# Patient Record
Sex: MALE | Race: WHITE | NOT HISPANIC OR LATINO
[De-identification: names, ages, dates, MRNs, and addresses within clinical notes are randomized per-mention and may not be internally consistent; named-entity substitution may affect disease eponyms.]

---

## 2019-08-28 ENCOUNTER — APPOINTMENT (OUTPATIENT)
Dept: PULMONOLOGY | Facility: CLINIC | Age: 64
End: 2019-08-28
Payer: MEDICARE

## 2019-08-28 VITALS
TEMPERATURE: 98.1 F | BODY MASS INDEX: 36.86 KG/M2 | RESPIRATION RATE: 15 BRPM | DIASTOLIC BLOOD PRESSURE: 86 MMHG | SYSTOLIC BLOOD PRESSURE: 121 MMHG | HEART RATE: 103 BPM | OXYGEN SATURATION: 96 % | HEIGHT: 63 IN | WEIGHT: 208 LBS

## 2019-08-28 PROCEDURE — 99204 OFFICE O/P NEW MOD 45 MIN: CPT

## 2019-08-28 RX ORDER — LISINOPRIL 20 MG/1
20 TABLET ORAL
Refills: 0 | Status: ACTIVE | COMMUNITY

## 2019-08-28 RX ORDER — ATORVASTATIN CALCIUM 40 MG/1
40 TABLET, FILM COATED ORAL
Refills: 0 | Status: ACTIVE | COMMUNITY

## 2019-08-28 NOTE — ASSESSMENT
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  Patient was d/w sleep apnea in 2011 and was fitted for a pressure of 14 cm H20. Patient wanted a new machine and went for a repeat PSG (not provided during encounter) and because insurance company wanted a face-face would not provide the machine.  The new pressure is 13 cmH20 according to a note brought by the mother.  Patient has [employer] as his DME supplier.  Preferred to switch suppliers at this time, will place order.  Reached out to sleep clinic in [place] to receive the official results of the sleep study. Patient referred to the [facility] for mask fitting. The ramifications of VAZQUEZ and its potential therapeutic modalities were discussed with the patient. The patient was cautioned on the importance of avoiding drowsy driving. He will follow up with us after the PSG.\par  \par Sohan Elder, \par Sleep Fellow

## 2019-08-28 NOTE — PHYSICAL EXAM
[General Appearance - Well Developed] : well developed [Normal Appearance] : normal appearance [Well Groomed] : well groomed [General Appearance - Well Nourished] : well nourished [No Deformities] : no deformities [General Appearance - In No Acute Distress] : no acute distress [Normal Conjunctiva] : the conjunctiva exhibited no abnormalities [Eyelids - No Xanthelasma] : the eyelids demonstrated no xanthelasmas [Normal Oropharynx] : normal oropharynx [Neck Appearance] : the appearance of the neck was normal [Neck Cervical Mass (___cm)] : no neck mass was observed [Jugular Venous Distention Increased] : there was no jugular-venous distention [Thyroid Nodule] : there were no palpable thyroid nodules [Thyroid Diffuse Enlargement] : the thyroid was not enlarged [Heart Rate And Rhythm] : heart rate was normal and rhythm regular [Heart Sounds] : normal S1 and S2 [Murmurs] : no murmurs [Heart Sounds Gallop] : no gallops [Heart Sounds Pericardial Friction Rub] : no pericardial rub [Auscultation Breath Sounds / Voice Sounds] : lungs were clear to auscultation bilaterally [Abnormal Walk] : normal gait [Motor Tone] : muscle strength and tone were normal [Musculoskeletal - Swelling] : no joint swelling seen [Nail Clubbing] : no clubbing of the fingernails [Cyanosis, Localized] : no localized cyanosis [Petechial Hemorrhages (___cm)] : no petechial hemorrhages [Skin Color & Pigmentation] : normal skin color and pigmentation [Skin Turgor] : normal skin turgor [] : no rash [Deep Tendon Reflexes (DTR)] : deep tendon reflexes were 2+ and symmetric [Sensation] : the sensory exam was normal to light touch and pinprick [No Focal Deficits] : no focal deficits [Oriented To Time, Place, And Person] : oriented to person, place, and time [Impaired Insight] : insight and judgment were intact [Affect] : the affect was normal

## 2019-08-28 NOTE — REVIEW OF SYSTEMS
[EDS: ESS=____] : daytime somnolence: ESS=[unfilled] [Fatigue] : fatigue [Obesity] : obesity [Negative] : Psychiatric

## 2019-08-28 NOTE — HISTORY OF PRESENT ILLNESS
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  Patient has been compliant with therapy but wanted to obtain a new machine.  Had a sleep study performed in 02/2019 but patient did not obtain machine as he did not have a face to face with a physician.  He endorses a 20 lbs weight loss but no other significant changes to medical history.  [Snoring] : no snoring [Witnessed Apneas] : no witnessed sleep apnea [Frequent Nocturnal Awakening] : no nocturnal awakening

## 2019-09-06 ENCOUNTER — EMERGENCY (EMERGENCY)
Facility: HOSPITAL | Age: 64
LOS: 1 days | Discharge: HOME | End: 2019-09-06
Attending: EMERGENCY MEDICINE
Payer: MEDICARE

## 2019-09-06 VITALS
TEMPERATURE: 98 F | WEIGHT: 205.03 LBS | DIASTOLIC BLOOD PRESSURE: 98 MMHG | SYSTOLIC BLOOD PRESSURE: 137 MMHG | OXYGEN SATURATION: 95 % | HEART RATE: 113 BPM | RESPIRATION RATE: 18 BRPM | HEIGHT: 63 IN

## 2019-09-06 PROCEDURE — 93010 ELECTROCARDIOGRAM REPORT: CPT

## 2019-09-06 PROCEDURE — 99285 EMERGENCY DEPT VISIT HI MDM: CPT | Mod: GC

## 2019-09-07 VITALS
HEART RATE: 75 BPM | SYSTOLIC BLOOD PRESSURE: 140 MMHG | RESPIRATION RATE: 18 BRPM | DIASTOLIC BLOOD PRESSURE: 86 MMHG | OXYGEN SATURATION: 99 %

## 2019-09-07 LAB
ALBUMIN SERPL ELPH-MCNC: 4.2 G/DL — SIGNIFICANT CHANGE UP (ref 3.3–5)
ALP SERPL-CCNC: 104 U/L — SIGNIFICANT CHANGE UP (ref 40–120)
ALT FLD-CCNC: 28 U/L — SIGNIFICANT CHANGE UP (ref 10–45)
ANION GAP SERPL CALC-SCNC: 15 MMOL/L — SIGNIFICANT CHANGE UP (ref 5–17)
APTT BLD: 32.9 SEC — SIGNIFICANT CHANGE UP (ref 27.5–36.3)
AST SERPL-CCNC: 27 U/L — SIGNIFICANT CHANGE UP (ref 10–40)
BILIRUB SERPL-MCNC: 0.5 MG/DL — SIGNIFICANT CHANGE UP (ref 0.2–1.2)
BUN SERPL-MCNC: 28 MG/DL — HIGH (ref 7–23)
CALCIUM SERPL-MCNC: 10.1 MG/DL — SIGNIFICANT CHANGE UP (ref 8.4–10.5)
CHLORIDE SERPL-SCNC: 102 MMOL/L — SIGNIFICANT CHANGE UP (ref 96–108)
CO2 SERPL-SCNC: 21 MMOL/L — LOW (ref 22–31)
CREAT SERPL-MCNC: 1.7 MG/DL — HIGH (ref 0.5–1.3)
D DIMER BLD IA.RAPID-MCNC: 160 NG/ML DDU — SIGNIFICANT CHANGE UP
GLUCOSE SERPL-MCNC: 127 MG/DL — HIGH (ref 70–99)
HCT VFR BLD CALC: 45.3 % — SIGNIFICANT CHANGE UP (ref 39–50)
HGB BLD-MCNC: 14.8 G/DL — SIGNIFICANT CHANGE UP (ref 13–17)
INR BLD: 1.02 RATIO — SIGNIFICANT CHANGE UP (ref 0.88–1.16)
MAGNESIUM SERPL-MCNC: 2 MG/DL — SIGNIFICANT CHANGE UP (ref 1.6–2.6)
MCHC RBC-ENTMCNC: 31 PG — SIGNIFICANT CHANGE UP (ref 27–34)
MCHC RBC-ENTMCNC: 32.8 GM/DL — SIGNIFICANT CHANGE UP (ref 32–36)
MCV RBC AUTO: 94.7 FL — SIGNIFICANT CHANGE UP (ref 80–100)
PLATELET # BLD AUTO: 253 K/UL — SIGNIFICANT CHANGE UP (ref 150–400)
POTASSIUM SERPL-MCNC: 3.9 MMOL/L — SIGNIFICANT CHANGE UP (ref 3.5–5.3)
POTASSIUM SERPL-SCNC: 3.9 MMOL/L — SIGNIFICANT CHANGE UP (ref 3.5–5.3)
PROT SERPL-MCNC: 7.1 G/DL — SIGNIFICANT CHANGE UP (ref 6–8.3)
PROTHROM AB SERPL-ACNC: 11.7 SEC — SIGNIFICANT CHANGE UP (ref 10–12.9)
RBC # BLD: 4.78 M/UL — SIGNIFICANT CHANGE UP (ref 4.2–5.8)
RBC # FLD: 11.7 % — SIGNIFICANT CHANGE UP (ref 10.3–14.5)
SODIUM SERPL-SCNC: 138 MMOL/L — SIGNIFICANT CHANGE UP (ref 135–145)
TROPONIN T, HIGH SENSITIVITY RESULT: 12 NG/L — SIGNIFICANT CHANGE UP (ref 0–51)
TROPONIN T, HIGH SENSITIVITY RESULT: 13 NG/L — SIGNIFICANT CHANGE UP (ref 0–51)
WBC # BLD: 12.6 K/UL — HIGH (ref 3.8–10.5)
WBC # FLD AUTO: 12.6 K/UL — HIGH (ref 3.8–10.5)

## 2019-09-07 PROCEDURE — 71046 X-RAY EXAM CHEST 2 VIEWS: CPT | Mod: 26

## 2019-09-07 PROCEDURE — 80053 COMPREHEN METABOLIC PANEL: CPT

## 2019-09-07 PROCEDURE — 85730 THROMBOPLASTIN TIME PARTIAL: CPT

## 2019-09-07 PROCEDURE — 93005 ELECTROCARDIOGRAM TRACING: CPT

## 2019-09-07 PROCEDURE — 83735 ASSAY OF MAGNESIUM: CPT

## 2019-09-07 PROCEDURE — 85027 COMPLETE CBC AUTOMATED: CPT

## 2019-09-07 PROCEDURE — 84484 ASSAY OF TROPONIN QUANT: CPT

## 2019-09-07 PROCEDURE — 71046 X-RAY EXAM CHEST 2 VIEWS: CPT

## 2019-09-07 PROCEDURE — 85610 PROTHROMBIN TIME: CPT

## 2019-09-07 PROCEDURE — 99284 EMERGENCY DEPT VISIT MOD MDM: CPT | Mod: 25

## 2019-09-07 PROCEDURE — 85379 FIBRIN DEGRADATION QUANT: CPT

## 2019-09-07 RX ORDER — SODIUM CHLORIDE 9 MG/ML
1000 INJECTION INTRAMUSCULAR; INTRAVENOUS; SUBCUTANEOUS ONCE
Refills: 0 | Status: COMPLETED | OUTPATIENT
Start: 2019-09-07 | End: 2019-09-07

## 2019-09-07 RX ADMIN — SODIUM CHLORIDE 1000 MILLILITER(S): 9 INJECTION INTRAMUSCULAR; INTRAVENOUS; SUBCUTANEOUS at 02:00

## 2019-09-07 NOTE — ED ADULT NURSE NOTE - OBJECTIVE STATEMENT
64 yr old male arrived to the ED form home c/o palpitations. HX HDL, seizures, depression  per pt he was been feeling a "squeezing" over the L side of his chest x3-4 days. pt also endorses feeling episodes of sweating as well as feeling "spacey" and dizzy. upon assessment pt is a&ox3, pt is speaking slowly but clearly, flat affect noted. pt having difficulty following commands. lungs clear tonya. abd is soft, non tender. chest pain is over L chest wall, non radiating, non produceable with palpation. pt denies fever, chills, nausea, vomiting, chest pain or sob

## 2019-09-07 NOTE — ED PROVIDER NOTE - CLINICAL SUMMARY MEDICAL DECISION MAKING FREE TEXT BOX
63 y/o M with hx of HTN, HLD, epilepsy,  p/w one month of palpitations and episodes of "zoning out". Given history will r/o ACS with ECG, troponin, CXR. Heart score of 3. Will r/o PE with D dimer. Doubt dissection given exam and hisotry of sx's.   Considered possible absence seizures although less likely given age of onset. 65 y/o M with hx of HTN, HLD, epilepsy,  p/w one month of palpitations and episodes of "zoning out". Given history will r/o ACS with ECG, troponin, CXR. Heart score of 3. Will r/o PE with D dimer. Doubt dissection given exam and hisotry of sx's.   Considered possible absence seizures although less likely given age of onset and prior sz manifested as tonic clonic.  Patient on multiple anti-epileptics, has close f/u c neuro.  --BMM

## 2019-09-07 NOTE — ED PROVIDER NOTE - OBJECTIVE STATEMENT
Mr Simón is a 63 y/o M with a hx of HTN, HLD, Epilepsy who p/w one month of gradually worsening chest discomfort. Pt has intermittent episodes of feeling like the left side of his chest is "squeezing". Tonight he felt like he had 30< episodes on the drive here. Denies any pain with exertion or radiation. No SOB, diaphoresis, LE swelling, nausea, vomiting, recent illnesses.   Pt also endorses episodes of starring off for about a min. He feels like these starring episodes are sometimes associated with the chest discomfort. Mr Simón is a 65 y/o M with a hx of HTN, HLD, Epilepsy who p/w one month of gradually worsening chest discomfort. Pt has intermittent episodes of feeling like the left side of his chest is "squeezing". Tonight he felt like he had 30< episodes on the drive here. Denies any pain with exertion or radiation. No SOB, diaphoresis, LE swelling, nausea, vomiting, recent illnesses.  States this has been going on for several weeks, does not believe there is any change in nature of symptoms recently.  Accompanied by sister, who was concerned about sypmtoms; states patient has mild DD.    Pt also endorsed 1 recent episode of starring off for about a min. He feels like this starring episode is sometimes associated with the chest discomfort.  As per sister, patient does not have a history of absence seizures.

## 2019-09-07 NOTE — ED PROVIDER NOTE - NSFOLLOWUPINSTRUCTIONS_ED_ALL_ED_FT
1) You were seen in the ER for palpitations. The patient has been informed of all concerning signs and symptoms to return to Emergency Department, the necessity to follow up with PMD/Clinic/follow up provided within 2-3 days was explained, and the patient reports understanding of above with capacity and insight. You can look at the discharge papers for some examples of specific signs and symptoms to look out for.  2) Please follow up with CARDIOLOGY to set up stress test, call them to make appointment.   2) Please follow up with your NEUROLOGIST for possible video EEGs.

## 2019-09-07 NOTE — ED PROVIDER NOTE - NSFOLLOWUPCLINICS_GEN_ALL_ED_FT
[facility]  Cardiology  [address] [place] [id number]  Phone: [phone]  Fax:   Follow Up Time:

## 2019-09-07 NOTE — ED PROVIDER NOTE - PATIENT PORTAL LINK FT
You can access the FollowMyHealth Patient Portal offered by [facility] by registering at the following website: [email or web address]. By joining [employer]’s FollowMyHealth portal, you will also be able to view your health information using other applications (apps) compatible with our system.

## 2019-09-07 NOTE — ED PROVIDER NOTE - PROGRESS NOTE DETAILS
Dr Khan: pt informed about Cr, aware of his CKd and is being followed by his pcp. Trops negative, Discussed admission for stress Sunday night into monday but pt does not want admission, would rayjer f/u with cardiologist outpt to set up stress. Pt also endorses staring episodes that he is following neurology for. Dr Khan: pt informed about Cr, aware of his CKd and is being followed by his pcp. Trops negative, Recommended admission for stress Sunday night into monday but pt does not want admission, would rayjer f/u with cardiologist outpt to set up stress. Pt also endorses staring episodes that he is following neurology for.  Understands symptoms may be related to atypical acute coronary syndrome and was provided strict return precautions.

## 2019-09-16 ENCOUNTER — APPOINTMENT (OUTPATIENT)
Dept: ORTHOPEDIC SURGERY | Facility: CLINIC | Age: 64
End: 2019-09-16
Payer: MEDICARE

## 2019-09-16 VITALS
HEIGHT: 63 IN | WEIGHT: 211 LBS | DIASTOLIC BLOOD PRESSURE: 78 MMHG | HEART RATE: 62 BPM | BODY MASS INDEX: 37.39 KG/M2 | SYSTOLIC BLOOD PRESSURE: 125 MMHG

## 2019-09-16 DIAGNOSIS — M40.40 POSTURAL LORDOSIS, SITE UNSPECIFIED: ICD-10-CM

## 2019-09-16 DIAGNOSIS — M40.209 UNSPECIFIED KYPHOSIS, SITE UNSPECIFIED: ICD-10-CM

## 2019-09-16 PROCEDURE — 99203 OFFICE O/P NEW LOW 30 MIN: CPT

## 2019-09-16 PROCEDURE — 72082 X-RAY EXAM ENTIRE SPI 2/3 VW: CPT

## 2019-09-16 NOTE — PHYSICAL EXAM
[Stooped] : stooped [de-identified] : Examination of the thoracic and lumbar spine reveals no midline tenderness palpation, step-offs, or skin lesions. He has significant upper thoracic kyphosis. Well-healed thoracolumbar incision. Decreased range of motion with respect to flexion, extension, lateral bending, and rotation. No tenderness to palpation of the sciatic notch. No tenderness palpation of the bilateral greater trochanters. No pain with passive internal/external rotation of the hips. No instability of bilateral lower extremities.  Negative SIDRA. Negative straight leg raise bilaterally. No bowstring. Negative femoral stretch. 5 out of 5 iliopsoas, hip abductors, hips adductors, quadriceps, hamstrings, gastrocsoleus, tibialis anterior, extensor hallucis longus, peroneals. Grossly intact sensation to light touch bilateral lower extremities. 1+ patellar and Achilles reflexes. Downgoing Babinski. No clonus. Intact proprioception. Palpable pulses. No skin lesion and no edema on the right and left lower extremities. [de-identified] : AP lateral scoliosis x-rays demonstrate instrumented thoracolumbar fusion with hook construct. He is dislodgment of his hooks in his lumbar spine. Kyphosis of the thoracic spine with hyperlordosis of his lumbar spine

## 2019-09-16 NOTE — HISTORY OF PRESENT ILLNESS
[de-identified] : Mr. OSCAR MILAN  is a 64 year old male who presents to the office with a chronic history of low back pain.  He had a spinal fusion in 1995.  He has had pain since then.  He had multiple seizures and fell at that time.   Denies any LE radicular symptoms.  Normal bowel and bladder control.   Denies any recent fevers, chills, sweats, weight loss, or infection.  He takes tylenol for symptom control.  He is not very active. \par

## 2019-09-16 NOTE — DISCUSSION/SUMMARY
[de-identified] : We discussed nonsurgical and surgical options. He will try a course of physical therapy. Follow up afterwards.

## 2019-10-01 ENCOUNTER — OUTPATIENT (OUTPATIENT)
Dept: OUTPATIENT SERVICES | Facility: HOSPITAL | Age: 64
LOS: 1 days | End: 2019-10-01
Payer: MEDICARE

## 2019-10-01 PROCEDURE — G9001: CPT

## 2019-10-09 DIAGNOSIS — Z71.89 OTHER SPECIFIED COUNSELING: ICD-10-CM

## 2019-10-09 PROBLEM — E78.49 OTHER HYPERLIPIDEMIA: Chronic | Status: ACTIVE | Noted: 2019-09-07

## 2019-10-09 PROBLEM — I10 ESSENTIAL (PRIMARY) HYPERTENSION: Chronic | Status: ACTIVE | Noted: 2019-09-07

## 2020-01-23 ENCOUNTER — APPOINTMENT (OUTPATIENT)
Dept: PULMONOLOGY | Facility: CLINIC | Age: 65
End: 2020-01-23
Payer: MEDICARE

## 2020-01-23 VITALS
OXYGEN SATURATION: 97 % | HEART RATE: 67 BPM | DIASTOLIC BLOOD PRESSURE: 72 MMHG | WEIGHT: 200 LBS | SYSTOLIC BLOOD PRESSURE: 107 MMHG | TEMPERATURE: 98 F | RESPIRATION RATE: 16 BRPM | BODY MASS INDEX: 35.44 KG/M2 | HEIGHT: 63 IN

## 2020-01-23 PROCEDURE — 99214 OFFICE O/P EST MOD 30 MIN: CPT

## 2020-01-23 NOTE — PHYSICAL EXAM
[General Appearance - Well Developed] : well developed [Normal Appearance] : normal appearance [Well Groomed] : well groomed [General Appearance - Well Nourished] : well nourished [No Deformities] : no deformities [General Appearance - In No Acute Distress] : no acute distress [Normal Conjunctiva] : the conjunctiva exhibited no abnormalities [Eyelids - No Xanthelasma] : the eyelids demonstrated no xanthelasmas [Normal Oropharynx] : normal oropharynx [Heart Rate And Rhythm] : heart rate was normal and rhythm regular [Heart Sounds] : normal S1 and S2 [Heart Sounds Gallop] : no gallops [Murmurs] : no murmurs [Heart Sounds Pericardial Friction Rub] : no pericardial rub [Auscultation Breath Sounds / Voice Sounds] : lungs were clear to auscultation bilaterally [Abnormal Walk] : normal gait [Musculoskeletal - Swelling] : no joint swelling seen [Motor Tone] : muscle strength and tone were normal [Nail Clubbing] : no clubbing of the fingernails [Petechial Hemorrhages (___cm)] : no petechial hemorrhages [Cyanosis, Localized] : no localized cyanosis [Skin Color & Pigmentation] : normal skin color and pigmentation [Skin Turgor] : normal skin turgor [] : no rash [Deep Tendon Reflexes (DTR)] : deep tendon reflexes were 2+ and symmetric [Sensation] : the sensory exam was normal to light touch and pinprick [No Focal Deficits] : no focal deficits [Oriented To Time, Place, And Person] : oriented to person, place, and time [Impaired Insight] : insight and judgment were intact [Affect] : the affect was normal [Neck Appearance] : the appearance of the neck was normal [Neck Cervical Mass (___cm)] : no neck mass was observed [Thyroid Diffuse Enlargement] : the thyroid was not enlarged [Jugular Venous Distention Increased] : there was no jugular-venous distention [Thyroid Nodule] : there were no palpable thyroid nodules

## 2020-01-23 NOTE — HISTORY OF PRESENT ILLNESS
[To Bed: ___] : ~he/she~ goes to bed at [unfilled] [Arises: ___] : arises at [unfilled] [AHI: ___ per hour] : Apnea-hypopnea index:  [unfilled] per hour [T90%: ___] : T90%: [unfilled]% [Robby desatuation%: ___] : Robby desaturation:  [unfilled]% [Date: ___] : the most recent therapeutic polysomnogram was completed [unfilled] [CPAP: ___ cmH2O] : CPAP: [unfilled] cmH2O [% Days used: ____] : Days used: [unfilled] % [% Days used > 4 hrs: ____] : Days used > 4 hrs: [unfilled] % [Mean nightly usage: ___ hrs] : Mean nightly usage: [unfilled] hrs [Therapy based AHI: ___ /hr] : Therapy based AHI: [unfilled] / hr [FreeTextEntry1] : 64 year old OSCAR MILAN  presents for follow-up to Aug/2019 visit for continued management of severe obstructive sleep apnea on CPAP therapy.\par \par COMORBID:  epilepsy\par \par CHIEF COMPLAINT:  Mask occasionally dislodges\par \par THERAPY:  The patient has been treated with CPAP therapy since he was diagnosed with severe VAZQUEZ in 2011.  He tolerates pressure of 13 cmH2O well. \par \par COMPLIANCE:  He reports nightly CPAP use from 10:30PM-6AM.\par BENEFIT:  improvement in  \par \par SLEEP:  \par \par INTERIM CHANGES:  in health, weight, medications.\par \par  [Nocturnal Oxygen] : The patient does not use nocturnal oxygen

## 2020-01-23 NOTE — ASSESSMENT
[FreeTextEntry1] : 64 year old OSCAR MILAN with epilepsy; continues to derive benefit from PAP therapy for severe obstructive sleep apnea.\par \par A review of therapeutic and compliance data reveals usage on 100% of nights averaging 2 hours 31 minutes; effective pressure at 13 cmH2O with therapy based AHI of 4.6, -- however, he has the machine on for the entirety of the night with most of the night with mask not at pressure-- he stated difficulty keeping the mask in place which is likely responsible for this. he requires mask re-fitting and education as to how to keep the mask in place throughout the night.  he denies difficulty tolerating cpap. however he reports that his sleep is adequate and that he denies eds. \par \par The patient is benefitting from CPAP therapy with continued resolution of VAZQUEZ-related symptoms and should continue PAP therapy at the current pressure setting.  Patient was referred to our center for a mask fitting.\par \par He will follow-up in 3-months with Bella to see if the mask on time is improved to the entirety of the night after mask interface refitting and education to be performed at the [facility].

## 2020-01-23 NOTE — REVIEW OF SYSTEMS
[Obesity] : obesity [Depression] : depression [Difficulty Initiating Sleep] : no difficulty falling asleep [Lower Extremity Discomfort] : no lower extremity discomfort [Unusual Sleep Behavior] : no unusual sleep behavior [Negative] : Musculoskeletal [de-identified] : as per PMH

## 2020-04-08 ENCOUNTER — APPOINTMENT (OUTPATIENT)
Dept: PULMONOLOGY | Facility: CLINIC | Age: 65
End: 2020-04-08

## 2020-07-27 ENCOUNTER — FORM ENCOUNTER (OUTPATIENT)
Age: 65
End: 2020-07-27

## 2020-08-09 ENCOUNTER — FORM ENCOUNTER (OUTPATIENT)
Age: 65
End: 2020-08-09

## 2020-08-12 ENCOUNTER — APPOINTMENT (OUTPATIENT)
Dept: PULMONOLOGY | Facility: CLINIC | Age: 65
End: 2020-08-12
Payer: MEDICARE

## 2020-08-12 VITALS
SYSTOLIC BLOOD PRESSURE: 136 MMHG | WEIGHT: 200 LBS | HEIGHT: 63 IN | DIASTOLIC BLOOD PRESSURE: 84 MMHG | HEART RATE: 57 BPM | RESPIRATION RATE: 15 BRPM | BODY MASS INDEX: 35.44 KG/M2 | TEMPERATURE: 98.5 F

## 2020-08-12 DIAGNOSIS — F32.9 MAJOR DEPRESSIVE DISORDER, SINGLE EPISODE, UNSPECIFIED: ICD-10-CM

## 2020-08-12 DIAGNOSIS — G40.909 EPILEPSY, UNSPECIFIED, NOT INTRACTABLE, W/OUT STATUS EPILEPTICUS: ICD-10-CM

## 2020-08-12 PROCEDURE — 99214 OFFICE O/P EST MOD 30 MIN: CPT

## 2020-08-12 RX ORDER — LEVETIRACETAM 750 MG/1
750 TABLET, FILM COATED ORAL TWICE DAILY
Refills: 0 | Status: ACTIVE | COMMUNITY

## 2020-08-12 RX ORDER — LAMOTRIGINE 100 MG/1
100 TABLET ORAL TWICE DAILY
Refills: 0 | Status: ACTIVE | COMMUNITY

## 2020-08-12 RX ORDER — GABAPENTIN 300 MG/1
300 CAPSULE ORAL EVERY MORNING
Refills: 0 | Status: ACTIVE | COMMUNITY

## 2020-08-12 NOTE — PHYSICAL EXAM
[Normal Appearance] : normal appearance [Well Groomed] : well groomed [Normal Conjunctiva] : the conjunctiva exhibited no abnormalities [General Appearance - In No Acute Distress] : no acute distress [Neck Appearance] : the appearance of the neck was normal [Involuntary Movements] : no involuntary movements were seen [No Focal Deficits] : no focal deficits [Mood] : the mood was normal [Affect] : the affect was normal [FreeTextEntry2] : no edema

## 2020-08-12 NOTE — ASSESSMENT
[FreeTextEntry1] : 65 year old OSCAR MILAN with severe obstructive sleep apnea treated with CPAP since 2011, presents for follow-up.\par \par Severe VAZQUEZ on CPAP 13 cmH2O\par - Reviewed therapy and compliance data download with patient\par - Good CPAP compliance (on 100% of nights averaging 10-hours 16-minutes.\par - Residual AHI (11.5) on pressure at 13 cmH2O\par - Excessive mask leak (46.9%) (4 hrs 49 mins on average)  \par - Benefit noted with improvement in sleep quality, daytime sleepiness and alertness.\par - Renewed supplies from [employer] including fit pack for nasal mask to continue therapy at 13 cmH2O.\par - Repeat data download in 2-3 weeks to check if new mask resolves excessive mask leak and residual AHI.\par \par He will follow up in one year or sooner if needed.\par

## 2020-08-12 NOTE — REVIEW OF SYSTEMS
[Recent Wt Loss (___ Lbs)] : recent [unfilled] ~Ulb weight loss [Obesity] : obesity [Nocturia] : nocturia [Depression] : depression [Difficulty Initiating Sleep] : difficulty falling asleep [Fatigue] : no fatigue [Nasal Congestion] : no nasal congestion [Postnasal Drip] : no postnasal drip [Shortness Of Breath] : no shortness of breath [Chest Pain] : no chest pain [Palpitations] : no palpitations [Edema] : ~T edema was not present [Thyroid Disease] : no thyroid disease [Diabetes] : no diabetes  [Anemia] : no anemia [History of Iron Deficiency] : no history of iron deficiency [A.M. Headache] : no headache present upon awakening [Heartburn] : no heartburn [Arthralgias] : no arthralgias [Anxious] : not anxious [Difficulty Maintaining Sleep] : no difficulty maintaining sleep [Lower Extremity Discomfort] : no lower extremity discomfort [Unusual Sleep Behavior] : no unusual sleep behavior [de-identified] : Paranoid schizophrenia.  Virtual visits with psychiatrist Dr. Reza and therapist once or twice monthly. [de-identified] : Epilepsy.  Last seizure March/April 2019 (had run out of lamictal)

## 2020-08-12 NOTE — HISTORY OF PRESENT ILLNESS
[AHI: ___ per hour] : Apnea-hypopnea index:  [unfilled] per hour [T90%: ___] : T90%: [unfilled]% [Robby desatuation%: ___] : Robby desaturation:  [unfilled]% [% Days used: ____] : Days used: [unfilled] % [Mean nightly usage: ___ hrs] : Mean nightly usage: [unfilled] hrs [Unintentional Sleep While Inactive] : unintentional sleep while inactive [DIS] : DIS [Awakening With Dry Mouth] : awakening with dry mouth [Arises: ___] : arises at [unfilled] [To Bed: ___] : ~he/she~ goes to bed at [unfilled] [Sleep Onset Latency: ___ minutes] : sleep onset latency of [unfilled] minutes reported [Nocturnal Awakenings: ___] : ~he/she~ typically has [unfilled] nocturnal awakenings [Daytime Sleep: ___] : daytime sleep: [unfilled] [TST: ___] : Total sleep time is [unfilled] [ESS: ___] : ESS score [unfilled] [WASO: ___] : Wake time after sleep onset is [unfilled] [CPAP: ___ cmH2O] : CPAP: [unfilled] cmH2O [Date: ___] : the most recent therapeutic polysomnogram was completed [unfilled] [% Days used > 4 hrs: ____] : Days used > 4 hrs: [unfilled] % [Therapy based AHI: ___ /hr] : Therapy based AHI: [unfilled] / hr [FreeTextEntry1] : 65 year old OSCAR MILAN with severe obstructive sleep apnea treated with CPAP therapy since 2011, presents for follow-up\par \par PMH:  Epilepsy, Paranoid Schizophrenia, Depression, HTN, HLD. obesity.\par \par VAZQUEZ\par The patient has been treated with CPAP therapy since 2011 diagnosis of severe VAZQUEZ (AHI 97.5) with severe decreases in oxygen saturation at [facility].  He presented to our center in Aug/2019 with request for a replacement CPAP, daytime somnolence and fatigue.  Nightly CPAP use for 8 hours at pressure of 13 cmH2O with nasal pillows mask is well tolerated, with noted improvement in sleep quality, daytime sleepiness and alertness.\par \par CHIEF COMPLAINT:  Patient complains that nasal pillows mask slides and leaks when he turns on his side.  His prior nasal mask did not, but it broke.  \par It takes 1.5-2 hours to fall asleep.\par \par SLEEP:  Bedtime 10:30PM.  It takes 1.5 to 2 hours to fall asleep.  He may awaken up to 3 times briefly for nocturia.  He estimates sleeping "at least 7-hours.".  He takes an occasional 1-hour nap any time of day, and falls asleep when bored.\par \par INTERIM CHANGES:  Medication list and Allergy list was updated.  2 other medications for auditory hallucinations were added, but he does recall the names.  He lost 3-lbs weight.  [Snoring] : no snoring [Frequent Nocturnal Awakening] : no nocturnal awakening [Witnessed Apneas] : no witnessed sleep apnea [Daytime Somnolence] : no daytime somnolence [Unintentional Sleep while Active] : no unintentional sleep while active [Awakes Unrefreshed] : does not awake unrefreshed [Awakes with Headache] : no headache upon awakening [Recent  Weight Gain] : no recent weight gain [Lower Extremity Discomfort] : no lower extremity discomfort in evening or at bedtime [Unusual Sleep Behavior] : no unusual sleep behavior [DMS] : no DMS [Nocturnal Oxygen] : The patient does not use nocturnal oxygen

## 2021-07-28 ENCOUNTER — APPOINTMENT (OUTPATIENT)
Dept: PULMONOLOGY | Facility: CLINIC | Age: 66
End: 2021-07-28
Payer: MEDICARE

## 2021-07-28 VITALS
DIASTOLIC BLOOD PRESSURE: 78 MMHG | RESPIRATION RATE: 15 BRPM | HEIGHT: 63 IN | TEMPERATURE: 98.1 F | HEART RATE: 67 BPM | SYSTOLIC BLOOD PRESSURE: 117 MMHG | WEIGHT: 200 LBS | BODY MASS INDEX: 35.44 KG/M2

## 2021-07-28 DIAGNOSIS — Z99.89 OBSTRUCTIVE SLEEP APNEA (ADULT) (PEDIATRIC): ICD-10-CM

## 2021-07-28 DIAGNOSIS — G47.33 OBSTRUCTIVE SLEEP APNEA (ADULT) (PEDIATRIC): ICD-10-CM

## 2021-07-28 PROCEDURE — 99215 OFFICE O/P EST HI 40 MIN: CPT

## 2021-07-28 RX ORDER — LURASIDONE HYDROCHLORIDE 20 MG/1
20 TABLET, FILM COATED ORAL EVERY MORNING
Refills: 0 | Status: DISCONTINUED | COMMUNITY
End: 2021-07-28

## 2021-07-28 RX ORDER — LURASIDONE HYDROCHLORIDE 40 MG/1
40 TABLET, FILM COATED ORAL
Refills: 0 | Status: ACTIVE | COMMUNITY

## 2021-07-28 NOTE — REVIEW OF SYSTEMS
[Fatigue] : fatigue [Obesity] : obesity [Heartburn] : heartburn [Nocturia] : nocturia [Arthralgias] : arthralgias [Depression] : depression [Nasal Congestion] : no nasal congestion [Postnasal Drip] : no postnasal drip [Shortness Of Breath] : no shortness of breath [Chest Pain] : no chest pain [Palpitations] : no palpitations [Edema] : ~T edema was not present [Thyroid Disease] : no thyroid disease [Diabetes] : no diabetes  [Anemia] : no anemia [History of Iron Deficiency] : no history of iron deficiency [Anxious] : not anxious [FreeTextEntry3] : sedentary [de-identified] : seizures under control.  Last seizure 4/2019. [FreeTextEntry6] : without sleep disturbance

## 2021-07-28 NOTE — ASSESSMENT
[FreeTextEntry1] : 66 year old OSCAR MILAN with Epilepsy, paranoid schizophrenia, depression, HTN, HLD, obesity class II; continues to derive benefit from CPAP therapy for obstructive sleep apnea since 2011.\par \par SEVERE VAZQUEZ ( AHI 97.5) on [employer] CPAP at 13 cmH2O using a nasal pillows mask\par \par CPAP use every night relaxes him, and improves his sleep.  AHI improved from 97.5 to 3.5.  [place] Sleepiness Scale score of 10 today.   Therapy and compliance data demonstrates good compliance nightly, insufficient hourly use (3 hours 12 minutes on average) with excessive mask leak.  Therapeutic AHI at 3.5 on pressure of 13 cmH2O.\par \par Patient c/o Nasal pillows mask falls out, cheek skin irritation, and not receiving a renewal of supplies.\par \par [employer]' recall information was reviewed and provided in written form, for potential health risk related to ingestion or inhalation of the sound-abatement foam used.  Patient is not experiencing new headaches, respiratory problems, nasal / throat irritation, or visible particles in the tubing.  Having weighed benefit vs risk, patient was advised to continue therapy for severe VAZQUEZ until device is repaired or replaced. \par \par PLAN\par Renewal of supplies from [employer] to continue therapy.\par They will phone [employer] on Monday if patient has not been contacted by then.\par If new mask still falls out, they will consider an in-office mask fitting with [facility].\par They will consider skin-liners for cheek irritation.

## 2021-07-28 NOTE — PHYSICAL EXAM
[Normal Appearance] : normal appearance [Well Groomed] : well groomed [General Appearance - In No Acute Distress] : no acute distress [Normal Conjunctiva] : the conjunctiva exhibited no abnormalities [Neck Appearance] : the appearance of the neck was normal [Heart Rate And Rhythm] : heart rate was normal and rhythm regular [Murmurs] : no murmurs [] : no respiratory distress [Auscultation Breath Sounds / Voice Sounds] : lungs were clear to auscultation bilaterally [Involuntary Movements] : no involuntary movements were seen [No Focal Deficits] : no focal deficits [Affect] : the affect was normal [Mood] : the mood was normal [FreeTextEntry2] : no edema

## 2021-07-28 NOTE — HISTORY OF PRESENT ILLNESS
[Obstructive Sleep Apnea] : obstructive sleep apnea [Arises: ___] : arises at [unfilled] [Sleep Onset Latency: ___ minutes] : sleep onset latency of [unfilled] minutes reported [WASO: ___] : Wake time after sleep onset is [unfilled] [TST: ___] : Total sleep time is [unfilled] [Daytime Sleep: ___] : daytime sleep: [unfilled] [AHI: ___ per hour] : Apnea-hypopnea index:  [unfilled] per hour [T90%: ___] : T90%: [unfilled]% [Robby desatuation%: ___] : Robby desaturation:  [unfilled]% [Date: ___] : the most recent therapeutic polysomnogram was completed [unfilled] [CPAP: ___ cmH2O] : CPAP: [unfilled] cmH2O [% Days used: ____] : Days used: [unfilled] % [% Days used > 4 hrs: ____] : Days used > 4 hrs: [unfilled] % [Mean nightly usage: ___ hrs] : Mean nightly usage: [unfilled] hrs [Therapy based AHI: ___ /hr] : Therapy based AHI: [unfilled] / hr [Frequent Nocturnal Awakening] : frequent nocturnal awakening [Awakes Unrefreshed] : awakening unrefreshed [Recent  Weight Gain] : recent weight gain [Daytime Somnolence] : daytime somnolence [To Bed: ___] : ~he/she~ goes to bed at [unfilled] [Nocturnal Awakenings: ___] : ~he/she~ typically has [unfilled] nocturnal awakenings [FreeTextEntry1] : 66 year old OSCAR MILAN on CPAP therapy for severe obstructive sleep apnea since , presents for annual follow-up visit.\par \par COMORBID:  Epilepsy, paranoid schizophrenia, depression, HTN, HLD, obesty\par \par SEVERE VAZQUEZ ( AHI 97.5) on CPAP 13 cmH2O\par \par  The patient was diagnosed with severe VAZQUEZ (AHI 97.5), severe decreases in oxygen saturation at [facility], and has been treated with CPAP therapy since.  \par  He presented to our center with request for a replacement CPAP.\par \par Today patient reports CPAP use every night relaxes him, and improves his sleep.  \par c/o Nasal pillows mask falls out, cheek skin irritation, and not receiving a renewal of supplies.\par \par EPWORTH SLEEPINESS SCALE\par \par How likely are you to doze off or fall asleep in the situations described below, in contrast to feeling just tired?  This refers to your usual way of life in recent times.  Even if you haven't done some of these things recently, try to work out how they would have affected you.  Use the following scale to choose one most appropriate number for each situation.......Chance of dozin= never. 1= slight. 2= moderate. 3= high.\par \par CHANCE OF DOZING............SITUATION\par 1.............................................Sitting and reading\par 2.............................................Watching TV\par 1.............................................Sitting inactive in a public place (eg a theatre or a meeting)\par 2.............................................As a passenger in a car for an hour without a break\par 2.............................................Lying down to rest in the afternoon when circumstances permit\par 0.............................................Sitting and talking to someone\par 1.............................................Sitting quietly after lunch without alcohol\par 1.............................................In a car, while stopped for a few minutes in traffic\par \par 10............................................TOTAL ESS SCORE [Snoring] : no snoring [Witnessed Apneas] : no witnessed sleep apnea [Unintentional Sleep while Active] : no unintentional sleep while active [Unintentional Sleep While Inactive] : no unintentional sleep while inactive [Awakes with Headache] : no headache upon awakening [Awakening With Dry Mouth] : no dry mouth upon awakening [DIS] : no DIS [DMS] : no DMS [Lower Extremity Discomfort] : no lower extremity discomfort in evening or at bedtime [Unusual Sleep Behavior] : no unusual sleep behavior [Nocturnal Oxygen] : The patient does not use nocturnal oxygen [ESS] : 10

## 2021-12-08 ENCOUNTER — APPOINTMENT (OUTPATIENT)
Dept: PULMONOLOGY | Facility: CLINIC | Age: 66
End: 2021-12-08
Payer: MEDICARE

## 2021-12-08 VITALS
WEIGHT: 198 LBS | SYSTOLIC BLOOD PRESSURE: 116 MMHG | OXYGEN SATURATION: 94 % | TEMPERATURE: 97 F | BODY MASS INDEX: 35.08 KG/M2 | HEIGHT: 63 IN | RESPIRATION RATE: 15 BRPM | DIASTOLIC BLOOD PRESSURE: 77 MMHG | HEART RATE: 76 BPM

## 2021-12-08 PROCEDURE — 99214 OFFICE O/P EST MOD 30 MIN: CPT

## 2021-12-08 RX ORDER — COLD-HOT PACK
EACH MISCELLANEOUS
Refills: 0 | Status: ACTIVE | COMMUNITY

## 2021-12-08 RX ORDER — CHOLECALCIFEROL (VITAMIN D3) 25 MCG
TABLET ORAL
Refills: 0 | Status: ACTIVE | COMMUNITY

## 2021-12-08 NOTE — ASSESSMENT
[FreeTextEntry1] : 66 year old OSCAR MILAN continues to derive benefit from PAP therapy for obstructive sleep apnea since 2011.  History of HTN, HLD, epilepsy, paranoid schizophrenia, depression, and obesity class II.\par \par SEVERE VAZQUEZ (AHI 97.5) on CPAP 13 cmH2O using a nasal pillows mask.\par \par ·	Sleep and prior apnea-related symptoms have improved.  Kelso Sleepiness Scale score of 3 today.  \par •	Data download:  100% compliant. Average 8 hrs 31 min.  AHI 11.1. Insignificant mask leak.     \par \par Discussed [employer] PAP recall again in detail.  Insurance will not replace <5 year old 2019 PAP.  He has not received a replacement from [employer].  Mother is concerned about cancer risk, and is discouraging patient from continuing to use the recall device.  She requested a referral for a mandibular advancement oral appliance.  Warned mother OAT may not effectively resolve apneas, but she is adamant to try it.\par \par Long sleep periods:  Various patient medications may cause drowsiness including gabapentin, Keppra, Lamotrigine, and Latuda.\par \par PLAN:\par ·	List of dentists skilled in OAT for VAZQUEZ was provided.\par ·	Mother is considering OAT for patient, until [employer] replaces the recalled CPAP.\par ·	She understands a HST is required on OAT to document its efficacy. \par ·	Follow-up in one year or sooner if needed.\par \par \par

## 2021-12-08 NOTE — HISTORY OF PRESENT ILLNESS
[FreeTextEntry1] : 66 year old OSCAR MILAN with history of obstructive sleep apnea treated with CPAP since 2011; HTN, HLD, epilepsy, paranoid schizophrenia, depression, and obesity class II; following up to July visit.\par \par SEVERE VAZQUEZ (AHI 97.5) on CPAP 13 cmH2O using a nasal pillows mask.\par \par 2011:  \par Initial evaluation for severe somnolence, difficulty awakening in the morning, difficulty maintaining wakefulness, awakening with headaches and dry mouth, heavy snoring; BMI 43, 20.5" neck circumference, FTP III. \par \par DX:  \par •	6/8/2011 Split study:  AHI 97.5.  T90 10.6%.  Robby desaturation 62%.  Incomplete titration.\par •	Titration 2/4/2019 at [facility]. CPAP 13 cmH2O reduced RDI to 0.\par \par TX:  \par •	9/27/2019 DreamStation Auto CPAP from [employer] (recalled)\par •	Data download 12/7/21:  100% compliant. Average 8h 31m. AHI 11.1. Average 5.9% large leak.  \par •	With CPAP use for the entire duration of sleep, he sleeps better.  \par •	Experiences chest pressure without CPAP.\par C/O \par Mother c/o "he is not active, just eating and sleeping" for 2-years since Covid.\par Cheek skin irritation persists - they did not order skin liners as was suggested at last visit.\par \par SLEEPING:\par 7PM-10:30PM + 11:30PM-11:30AM/12PM.  Brief awakenings for nocturia (3-4 x).    \par \par He consumes 2 cups of coffee as late as 1PM.  \par Registered his recalled PAP device in October.  Mother is concerned about cancer risk, and is discouraging patient from continuing to use the recall device.   She requested a referral for an oral appliance (suggested by her friend). \par \par INTERIM CHANGES\par Renal insufficiency (50%).  Bilateral knee pain.  Frequent falls.  Latuda dose was decreased.\par  [ESS] : 3

## 2021-12-08 NOTE — REVIEW OF SYSTEMS
[Obesity] : obesity [Arthralgias] : arthralgias [Depression] : depression [Anxious] : anxious [Daytime Somnolence: ESS=____] : daytime somnolence: ESS=[unfilled] [Awakes With Dry Mouth] : awakes with dry mouth [Negative] : Hematologic [Fatigue] : no fatigue [Recent Wt Loss (___ Lbs)] : no recent weight loss [Nasal Congestion] : no nasal congestion [Postnasal Drip] : no postnasal drip [Shortness Of Breath] : no shortness of breath [Chest Pain] : no chest pain [Palpitations] : no palpitations [Edema] : ~T edema was not present [Thyroid Disease] : no thyroid disease [Diabetes] : no diabetes  [Heartburn] : no heartburn [Nocturia] : no nocturia [Snoring] : no snoring [Witnessed Apneas] : demonstrated no ~M apnea [Frequent Nocturnal Awakenings] : no nocturnal awakenings from sleep [Unintentional Sleep while active] : no unintentional sleep while active [Unintentional Sleep while inactive] : no unintentional sleep while inactive [Awakes Unrefreshed] : restorative sleep [Awakes With Headache] : awakes without a headache [Recent Wt Gain (___ Lbs)] : no recent weight gain [Difficulty Initiating Sleep] : no difficulty falling asleep [Difficulty Maintaining Sleep] : no difficulty maintaining sleep [Irresistible urge to move legs] : no irresistible urge to move legs because of lower extremity discomfort [Unusual Sleep Behavior] : no unusual sleep behavior [FreeTextEntry9] : chest pressure without CPAP [de-identified] : last seizure 2019 [FreeTextEntry5] : renal insufficiency 50% [FreeTextEntry6] : without sleep disturbance

## 2021-12-27 ENCOUNTER — NON-APPOINTMENT (OUTPATIENT)
Age: 66
End: 2021-12-27

## 2022-01-04 ENCOUNTER — NON-APPOINTMENT (OUTPATIENT)
Age: 67
End: 2022-01-04

## 2022-05-11 ENCOUNTER — NON-APPOINTMENT (OUTPATIENT)
Age: 67
End: 2022-05-11

## 2022-12-12 ENCOUNTER — APPOINTMENT (OUTPATIENT)
Dept: PULMONOLOGY | Facility: CLINIC | Age: 67
End: 2022-12-12

## 2022-12-12 VITALS
HEIGHT: 63 IN | BODY MASS INDEX: 37.21 KG/M2 | SYSTOLIC BLOOD PRESSURE: 158 MMHG | WEIGHT: 210 LBS | HEART RATE: 63 BPM | DIASTOLIC BLOOD PRESSURE: 81 MMHG | RESPIRATION RATE: 15 BRPM | TEMPERATURE: 98.4 F

## 2022-12-12 PROCEDURE — 99215 OFFICE O/P EST HI 40 MIN: CPT

## 2022-12-12 NOTE — ASSESSMENT
[FreeTextEntry1] : 67 year old male with severe VAZQUEZ on CPAP here for a follow up visit after replacement equipment was received. Data is not available on Care . He brought the devices with him. The modem was not transferred from his old device to the new, so that was done during the visit. The SD card was also transferred. Also provided an Ani nasal cradle fit pack. Follow up in 3 months, sooner if needed.

## 2022-12-12 NOTE — PHYSICAL EXAM
[General Appearance - Well Developed] : well developed [Normal Appearance] : normal appearance [No Deformities] : no deformities [General Appearance - Well Nourished] : well nourished [Normal Conjunctiva] : the conjunctiva exhibited no abnormalities [Neck Appearance] : the appearance of the neck was normal [Apical Impulse] : the apical impulse was normal [Heart Rate And Rhythm] : heart rate was normal and rhythm regular [Heart Sounds] : normal S1 and S2 [Heart Sounds Gallop] : no gallops [] : no respiratory distress [Respiration, Rhythm And Depth] : normal respiratory rhythm and effort [Exaggerated Use Of Accessory Muscles For Inspiration] : no accessory muscle use [Auscultation Breath Sounds / Voice Sounds] : lungs were clear to auscultation bilaterally [Involuntary Movements] : no involuntary movements were seen [Abnormal Walk] : normal gait [Nail Clubbing] : no clubbing of the fingernails [Cyanosis, Localized] : no localized cyanosis [Skin Color & Pigmentation] : normal skin color and pigmentation [No Focal Deficits] : no focal deficits [Oriented To Time, Place, And Person] : oriented to person, place, and time [Impaired Insight] : insight and judgment were intact

## 2022-12-12 NOTE — HISTORY OF PRESENT ILLNESS
[FreeTextEntry1] : 67 year old male with obstructive sleep apnea here for a follow up visit.\par \par Medical conditions include epilepsy, paranoid schizophrenia, depression, HTN, HLD, obesity.\par \par Split study (6/8/2011) pre-CPAP AHI 97.5/hour, CPAP portion data unavailable due to IT issues.\par CPAP (6/15/2011) AHI 10.4/hour on CPAP of 14 cm H2O\par CPAP titration (2/24/2019) RDI 0 on CPAP 13 cm H2O\par \par Device: DreamStation Replacement (9/2022)\par Setting: CPAP 13 cm H2O\par Mask: Nasal pillows\par DME: [employer]\par \par Received his replacement device in September. Is using it nightly. He did not attach the humidifier because he does not use it. Does not like the mask and needs a replacement.

## 2023-01-21 ENCOUNTER — INPATIENT (INPATIENT)
Facility: HOSPITAL | Age: 68
LOS: 3 days | Discharge: NURSING, REHAB OR LONG-TERM CARE | DRG: 101 | End: 2023-01-25
Attending: PSYCHIATRY & NEUROLOGY | Admitting: PSYCHIATRY & NEUROLOGY
Payer: MEDICARE

## 2023-01-21 VITALS
TEMPERATURE: 100 F | OXYGEN SATURATION: 98 % | DIASTOLIC BLOOD PRESSURE: 134 MMHG | HEART RATE: 88 BPM | RESPIRATION RATE: 16 BRPM | WEIGHT: 207.9 LBS | HEIGHT: 63 IN | SYSTOLIC BLOOD PRESSURE: 189 MMHG

## 2023-01-21 DIAGNOSIS — Z98.890 OTHER SPECIFIED POSTPROCEDURAL STATES: Chronic | ICD-10-CM

## 2023-01-21 DIAGNOSIS — R56.9 UNSPECIFIED CONVULSIONS: ICD-10-CM

## 2023-01-21 LAB
ALBUMIN SERPL ELPH-MCNC: 3.7 G/DL — SIGNIFICANT CHANGE UP (ref 3.3–5)
ALBUMIN SERPL ELPH-MCNC: 4.8 G/DL — SIGNIFICANT CHANGE UP (ref 3.3–5)
ALP SERPL-CCNC: 107 U/L — SIGNIFICANT CHANGE UP (ref 40–120)
ALP SERPL-CCNC: 85 U/L — SIGNIFICANT CHANGE UP (ref 40–120)
ALT FLD-CCNC: 24 U/L — SIGNIFICANT CHANGE UP (ref 10–45)
ALT FLD-CCNC: 30 U/L — SIGNIFICANT CHANGE UP (ref 10–45)
ANION GAP SERPL CALC-SCNC: 16 MMOL/L — SIGNIFICANT CHANGE UP (ref 5–17)
ANION GAP SERPL CALC-SCNC: 21 MMOL/L — HIGH (ref 5–17)
APAP SERPL-MCNC: <15 UG/ML — SIGNIFICANT CHANGE UP (ref 10–30)
APPEARANCE UR: CLEAR — SIGNIFICANT CHANGE UP
AST SERPL-CCNC: 42 U/L — HIGH (ref 10–40)
AST SERPL-CCNC: 42 U/L — HIGH (ref 10–40)
BACTERIA # UR AUTO: NEGATIVE — SIGNIFICANT CHANGE UP
BASE EXCESS BLDV CALC-SCNC: -6 MMOL/L — LOW (ref -2–3)
BASOPHILS # BLD AUTO: 0.03 K/UL — SIGNIFICANT CHANGE UP (ref 0–0.2)
BASOPHILS NFR BLD AUTO: 0.2 % — SIGNIFICANT CHANGE UP (ref 0–2)
BILIRUB SERPL-MCNC: 0.6 MG/DL — SIGNIFICANT CHANGE UP (ref 0.2–1.2)
BILIRUB SERPL-MCNC: 0.6 MG/DL — SIGNIFICANT CHANGE UP (ref 0.2–1.2)
BILIRUB UR-MCNC: NEGATIVE — SIGNIFICANT CHANGE UP
BUN SERPL-MCNC: 17 MG/DL — SIGNIFICANT CHANGE UP (ref 7–23)
BUN SERPL-MCNC: 18 MG/DL — SIGNIFICANT CHANGE UP (ref 7–23)
CA-I SERPL-SCNC: 1.23 MMOL/L — SIGNIFICANT CHANGE UP (ref 1.15–1.33)
CALCIUM SERPL-MCNC: 10.3 MG/DL — SIGNIFICANT CHANGE UP (ref 8.4–10.5)
CALCIUM SERPL-MCNC: 9.2 MG/DL — SIGNIFICANT CHANGE UP (ref 8.4–10.5)
CHLORIDE BLDV-SCNC: 105 MMOL/L — SIGNIFICANT CHANGE UP (ref 96–108)
CHLORIDE SERPL-SCNC: 103 MMOL/L — SIGNIFICANT CHANGE UP (ref 96–108)
CHLORIDE SERPL-SCNC: 107 MMOL/L — SIGNIFICANT CHANGE UP (ref 96–108)
CK SERPL-CCNC: 808 U/L — HIGH (ref 30–200)
CO2 BLDV-SCNC: 20 MMOL/L — LOW (ref 22–26)
CO2 SERPL-SCNC: 16 MMOL/L — LOW (ref 22–31)
CO2 SERPL-SCNC: 18 MMOL/L — LOW (ref 22–31)
COLOR SPEC: YELLOW — SIGNIFICANT CHANGE UP
CREAT SERPL-MCNC: 1.76 MG/DL — HIGH (ref 0.5–1.3)
CREAT SERPL-MCNC: 1.91 MG/DL — HIGH (ref 0.5–1.3)
DIFF PNL FLD: ABNORMAL
EGFR: 38 ML/MIN/1.73M2 — LOW
EGFR: 42 ML/MIN/1.73M2 — LOW
EOSINOPHIL # BLD AUTO: 0 K/UL — SIGNIFICANT CHANGE UP (ref 0–0.5)
EOSINOPHIL NFR BLD AUTO: 0 % — SIGNIFICANT CHANGE UP (ref 0–6)
EPI CELLS # UR: 8 /HPF — HIGH
ETHANOL SERPL-MCNC: <10 MG/DL — SIGNIFICANT CHANGE UP (ref 0–10)
FLUAV AG NPH QL: SIGNIFICANT CHANGE UP
FLUBV AG NPH QL: SIGNIFICANT CHANGE UP
GAS PNL BLDV: 137 MMOL/L — SIGNIFICANT CHANGE UP (ref 136–145)
GAS PNL BLDV: SIGNIFICANT CHANGE UP
GAS PNL BLDV: SIGNIFICANT CHANGE UP
GLUCOSE BLDV-MCNC: 144 MG/DL — HIGH (ref 70–99)
GLUCOSE SERPL-MCNC: 118 MG/DL — HIGH (ref 70–99)
GLUCOSE SERPL-MCNC: 138 MG/DL — HIGH (ref 70–99)
GLUCOSE UR QL: ABNORMAL
HCO3 BLDV-SCNC: 19 MMOL/L — LOW (ref 22–29)
HCT VFR BLD CALC: 49.1 % — SIGNIFICANT CHANGE UP (ref 39–50)
HCT VFR BLDA CALC: 50 % — SIGNIFICANT CHANGE UP (ref 39–51)
HGB BLD CALC-MCNC: 16.6 G/DL — SIGNIFICANT CHANGE UP (ref 12.6–17.4)
HGB BLD-MCNC: 16.4 G/DL — SIGNIFICANT CHANGE UP (ref 13–17)
HYALINE CASTS # UR AUTO: 10 /LPF — HIGH (ref 0–2)
IMM GRANULOCYTES NFR BLD AUTO: 0.5 % — SIGNIFICANT CHANGE UP (ref 0–0.9)
KETONES UR-MCNC: ABNORMAL
LACTATE BLDV-MCNC: 4.1 MMOL/L — CRITICAL HIGH (ref 0.5–2)
LACTATE SERPL-SCNC: 1.6 MMOL/L — SIGNIFICANT CHANGE UP (ref 0.5–2)
LEUKOCYTE ESTERASE UR-ACNC: NEGATIVE — SIGNIFICANT CHANGE UP
LYMPHOCYTES # BLD AUTO: 0.59 K/UL — LOW (ref 1–3.3)
LYMPHOCYTES # BLD AUTO: 3.9 % — LOW (ref 13–44)
MAGNESIUM SERPL-MCNC: 2.3 MG/DL — SIGNIFICANT CHANGE UP (ref 1.6–2.6)
MCHC RBC-ENTMCNC: 30.7 PG — SIGNIFICANT CHANGE UP (ref 27–34)
MCHC RBC-ENTMCNC: 33.4 GM/DL — SIGNIFICANT CHANGE UP (ref 32–36)
MCV RBC AUTO: 91.9 FL — SIGNIFICANT CHANGE UP (ref 80–100)
MONOCYTES # BLD AUTO: 0.74 K/UL — SIGNIFICANT CHANGE UP (ref 0–0.9)
MONOCYTES NFR BLD AUTO: 4.8 % — SIGNIFICANT CHANGE UP (ref 2–14)
NEUTROPHILS # BLD AUTO: 13.83 K/UL — HIGH (ref 1.8–7.4)
NEUTROPHILS NFR BLD AUTO: 90.6 % — HIGH (ref 43–77)
NITRITE UR-MCNC: NEGATIVE — SIGNIFICANT CHANGE UP
NRBC # BLD: 0 /100 WBCS — SIGNIFICANT CHANGE UP (ref 0–0)
PCO2 BLDV: 35 MMHG — LOW (ref 42–55)
PH BLDV: 7.34 — SIGNIFICANT CHANGE UP (ref 7.32–7.43)
PH UR: 6.5 — SIGNIFICANT CHANGE UP (ref 5–8)
PHOSPHATE SERPL-MCNC: 2.3 MG/DL — LOW (ref 2.5–4.5)
PLATELET # BLD AUTO: 203 K/UL — SIGNIFICANT CHANGE UP (ref 150–400)
PO2 BLDV: 53 MMHG — HIGH (ref 25–45)
POTASSIUM BLDV-SCNC: 4 MMOL/L — SIGNIFICANT CHANGE UP (ref 3.5–5.1)
POTASSIUM SERPL-MCNC: 4 MMOL/L — SIGNIFICANT CHANGE UP (ref 3.5–5.3)
POTASSIUM SERPL-MCNC: 4.3 MMOL/L — SIGNIFICANT CHANGE UP (ref 3.5–5.3)
POTASSIUM SERPL-SCNC: 4 MMOL/L — SIGNIFICANT CHANGE UP (ref 3.5–5.3)
POTASSIUM SERPL-SCNC: 4.3 MMOL/L — SIGNIFICANT CHANGE UP (ref 3.5–5.3)
PROT SERPL-MCNC: 6.2 G/DL — SIGNIFICANT CHANGE UP (ref 6–8.3)
PROT SERPL-MCNC: 7.8 G/DL — SIGNIFICANT CHANGE UP (ref 6–8.3)
PROT UR-MCNC: >600
RBC # BLD: 5.34 M/UL — SIGNIFICANT CHANGE UP (ref 4.2–5.8)
RBC # FLD: 12.9 % — SIGNIFICANT CHANGE UP (ref 10.3–14.5)
RBC CASTS # UR COMP ASSIST: 1 /HPF — SIGNIFICANT CHANGE UP (ref 0–4)
RSV RNA NPH QL NAA+NON-PROBE: SIGNIFICANT CHANGE UP
SAO2 % BLDV: 84.7 % — SIGNIFICANT CHANGE UP (ref 67–88)
SARS-COV-2 RNA SPEC QL NAA+PROBE: SIGNIFICANT CHANGE UP
SODIUM SERPL-SCNC: 139 MMOL/L — SIGNIFICANT CHANGE UP (ref 135–145)
SODIUM SERPL-SCNC: 142 MMOL/L — SIGNIFICANT CHANGE UP (ref 135–145)
SP GR SPEC: 1.02 — SIGNIFICANT CHANGE UP (ref 1.01–1.02)
UROBILINOGEN FLD QL: NEGATIVE — SIGNIFICANT CHANGE UP
WBC # BLD: 15.26 K/UL — HIGH (ref 3.8–10.5)
WBC # FLD AUTO: 15.26 K/UL — HIGH (ref 3.8–10.5)
WBC UR QL: 11 /HPF — HIGH (ref 0–5)

## 2023-01-21 PROCEDURE — 70450 CT HEAD/BRAIN W/O DYE: CPT | Mod: 26,MA

## 2023-01-21 PROCEDURE — 71045 X-RAY EXAM CHEST 1 VIEW: CPT | Mod: 26

## 2023-01-21 PROCEDURE — 99285 EMERGENCY DEPT VISIT HI MDM: CPT | Mod: GC

## 2023-01-21 PROCEDURE — 72125 CT NECK SPINE W/O DYE: CPT | Mod: 26,MA

## 2023-01-21 RX ORDER — LEVETIRACETAM 250 MG/1
1000 TABLET, FILM COATED ORAL ONCE
Refills: 0 | Status: COMPLETED | OUTPATIENT
Start: 2023-01-21 | End: 2023-01-21

## 2023-01-21 RX ORDER — LACOSAMIDE 50 MG/1
150 TABLET ORAL EVERY 12 HOURS
Refills: 0 | Status: DISCONTINUED | OUTPATIENT
Start: 2023-01-21 | End: 2023-01-23

## 2023-01-21 RX ORDER — SODIUM CHLORIDE 9 MG/ML
1000 INJECTION INTRAMUSCULAR; INTRAVENOUS; SUBCUTANEOUS ONCE
Refills: 0 | Status: COMPLETED | OUTPATIENT
Start: 2023-01-21 | End: 2023-01-21

## 2023-01-21 RX ORDER — AZITHROMYCIN 500 MG/1
500 TABLET, FILM COATED ORAL ONCE
Refills: 0 | Status: COMPLETED | OUTPATIENT
Start: 2023-01-21 | End: 2023-01-21

## 2023-01-21 RX ORDER — LEVETIRACETAM 250 MG/1
2000 TABLET, FILM COATED ORAL ONCE
Refills: 0 | Status: DISCONTINUED | OUTPATIENT
Start: 2023-01-21 | End: 2023-01-21

## 2023-01-21 RX ORDER — LACOSAMIDE 50 MG/1
200 TABLET ORAL ONCE
Refills: 0 | Status: DISCONTINUED | OUTPATIENT
Start: 2023-01-21 | End: 2023-01-21

## 2023-01-21 RX ORDER — ACETAMINOPHEN 500 MG
1000 TABLET ORAL ONCE
Refills: 0 | Status: COMPLETED | OUTPATIENT
Start: 2023-01-21 | End: 2023-01-21

## 2023-01-21 RX ORDER — LEVETIRACETAM 250 MG/1
2000 TABLET, FILM COATED ORAL ONCE
Refills: 0 | Status: COMPLETED | OUTPATIENT
Start: 2023-01-21 | End: 2023-01-21

## 2023-01-21 RX ORDER — LEVETIRACETAM 250 MG/1
750 TABLET, FILM COATED ORAL EVERY 12 HOURS
Refills: 0 | Status: DISCONTINUED | OUTPATIENT
Start: 2023-01-21 | End: 2023-01-23

## 2023-01-21 RX ORDER — SODIUM CHLORIDE 9 MG/ML
1000 INJECTION, SOLUTION INTRAVENOUS ONCE
Refills: 0 | Status: COMPLETED | OUTPATIENT
Start: 2023-01-21 | End: 2023-01-21

## 2023-01-21 RX ORDER — VALPROIC ACID (AS SODIUM SALT) 250 MG/5ML
1000 SOLUTION, ORAL ORAL ONCE
Refills: 0 | Status: DISCONTINUED | OUTPATIENT
Start: 2023-01-21 | End: 2023-01-25

## 2023-01-21 RX ORDER — CEFTRIAXONE 500 MG/1
2000 INJECTION, POWDER, FOR SOLUTION INTRAMUSCULAR; INTRAVENOUS EVERY 24 HOURS
Refills: 0 | Status: DISCONTINUED | OUTPATIENT
Start: 2023-01-21 | End: 2023-01-24

## 2023-01-21 RX ADMIN — LEVETIRACETAM 400 MILLIGRAM(S): 250 TABLET, FILM COATED ORAL at 18:25

## 2023-01-21 RX ADMIN — SODIUM CHLORIDE 1000 MILLILITER(S): 9 INJECTION, SOLUTION INTRAVENOUS at 20:37

## 2023-01-21 RX ADMIN — LEVETIRACETAM 1000 MILLIGRAM(S): 250 TABLET, FILM COATED ORAL at 18:25

## 2023-01-21 RX ADMIN — Medication 1 MILLIGRAM(S): at 20:17

## 2023-01-21 RX ADMIN — LACOSAMIDE 200 MILLIGRAM(S): 50 TABLET ORAL at 19:43

## 2023-01-21 RX ADMIN — Medication 400 MILLIGRAM(S): at 22:27

## 2023-01-21 RX ADMIN — AZITHROMYCIN 255 MILLIGRAM(S): 500 TABLET, FILM COATED ORAL at 23:30

## 2023-01-21 RX ADMIN — SODIUM CHLORIDE 1000 MILLILITER(S): 9 INJECTION INTRAMUSCULAR; INTRAVENOUS; SUBCUTANEOUS at 23:25

## 2023-01-21 RX ADMIN — LEVETIRACETAM 600 MILLIGRAM(S): 250 TABLET, FILM COATED ORAL at 19:21

## 2023-01-21 RX ADMIN — LEVETIRACETAM 2000 MILLIGRAM(S): 250 TABLET, FILM COATED ORAL at 19:21

## 2023-01-21 RX ADMIN — Medication 1 MILLIGRAM(S): at 18:25

## 2023-01-21 RX ADMIN — CEFTRIAXONE 100 MILLIGRAM(S): 500 INJECTION, POWDER, FOR SOLUTION INTRAMUSCULAR; INTRAVENOUS at 22:38

## 2023-01-21 RX ADMIN — Medication 63.75 MILLIMOLE(S): at 23:27

## 2023-01-21 NOTE — H&P ADULT - NSICDXPASTMEDICALHX_GEN_ALL_CORE_FT
PAST MEDICAL HISTORY:  History of epilepsy     Hypertension, unspecified type     Obstructive sleep apnea     Other hyperlipidemia     Paranoid schizophrenia

## 2023-01-21 NOTE — ED PROVIDER NOTE - NSICDXPASTMEDICALHX_GEN_ALL_CORE_FT
PAST MEDICAL HISTORY:  History of epilepsy     Hypertension, unspecified type     Other hyperlipidemia

## 2023-01-21 NOTE — ED PROVIDER NOTE - CLINICAL SUMMARY MEDICAL DECISION MAKING FREE TEXT BOX
Liborio Hernandes, PGY-3- 67 year old male with hx of seizure disorder, s/p 3 seizures at home, recent med changes. Pt with borderline temp on arrival, concern for possible infection. Plan for stat head ct, eval for cause of seizure, possible exacerbation of known disorder, med non adherence, infection, ich. Neuro consult. Will need admission - icu vs tele. 67 year old male with hx of seizure disorder, s/p 3 seizures at home, recent med changes. Pt with borderline temp on arrival, concern for possible infection. Plan for stat head ct, eval for cause of seizure, possible exacerbation of known disorder, med non adherence, infection, ich. Neuro consult. Will need admission - icu vs tele. ZR

## 2023-01-21 NOTE — ED ADULT TRIAGE NOTE - HEART RATE (BEATS/MIN)
Oncology/Hematology Follow Up Note:    Assessment and Plan:    Admitted, with , shortness of breath, low pulse ox on 3/17/17      CLL  - Montero Stage III diagnosed in 2007 with lymphocytosis, anemia and thrombocytopenia  - has not been on any cytotoxic treatment  - was on IVIG every month for hypogammaglobulinemia  - her WBC has been in the range of 160-180 k  - rest of labs have been stable.  - with progressive leukocytosis, anemia, and possible tumor lysis started rituximab as single agent 3/ 23/17  - WBC improved.  - No e/o lysis on labs. Ok to decrease lab monitoring, discussed with Dr Ponce        Pulmonary  - VQ scan intermediate  - Agree with holding off on IV therapeutic heparin, LE dopplers negative.  - on IV antibiotics and IV steroids.         Increased K  - K appears to rise with her WBC  -  Was on kayexalate  - arterial K , shows K is normal.  - this was likely pseudohyperkalemia from WBC releasing K, and hemolysis within tube.        Anemia and thrombocytopenia  - from CLL  - monitor CBC serially     Leukocytosis  -due to CLL with reactive leukocytosis due to steroids and stress  -monitor serially as we begin rituximab    Code  - full             Subjective:    Feeling better, doing well no evidence of TLS    Scheduled Medications:  Reviewed active medications    Labs:  CBC RESULTS:   Recent Labs   Lab Test  03/24/17   0545  03/23/17   0505  03/22/17   0615   WBC  130.9*  379.7*  253.7*   HGB  7.8*  9.6*  9.1*   HCT  27.2*  37.5  32.5*   MCV  100  103*  100   PLT  68*  140*  113*       CMP  Recent Labs  Lab 03/26/17  0605 03/25/17  2330 03/25/17  1730 03/25/17  1210 03/25/17  0635 03/25/17  0020 03/24/17  1815  03/24/17  0545  03/23/17  1754  03/23/17  0820 03/23/17  0505  03/22/17  0615     --   --   --  142 140 142  < > 145*  < > 144  < >  --  139  --  137   POTASSIUM 4.3  --   --   --  3.3* 3.3* 3.3*  < > 2.9*  < > 2.6*  < > 2.8* 6.1*  < > 6.1*   CHLORIDE 104  --   --   --  105 103 104  < > 108  " < > 104  < >  --  100  --  97   CO2 27  --   --   --  31 29 29  < > 28  < > 33*  < >  --  35*  --  35*   ANIONGAP 9  --   --   --  6 8 9  < > 9  < > 7  < >  --  4  --  5   GLC 85  --   --   --  108* 128* 196*  < > 315*  < > 174*  < >  --  112*  --  121*   BUN 16  --   --   --  12 13 13  < > 9  < > 14  < >  --  17  --  18   CR 0.68 0.62  --   --  0.65 0.63 0.75  < > 0.69  < > 0.76  < >  --  0.76  < > 0.71   GFRESTIMATED 82 >90Non  GFR Calc  --   --  86 >90Non  GFR Calc 74  < > 81  < > 72  < >  --  73  < > 79   GFRESTBLACK >90African American GFR Calc >90African American GFR Calc  --   --  >90African American GFR Calc >90African American GFR Calc 89  < > >90African American GFR Calc  < > 87  < >  --  88  < > >90African American GFR Calc   CARLOS 7.5* 7.9* 8.0* 8.0* 7.1* 7.3* 7.3*  < > 6.0*  < > 7.1*  < >  --  8.1*  --  7.9*   MAG  --   --   --   --   --   --   --   --  1.7  --   --   --  2.4*  --   --   --    PHOS 2.3* 2.2* 2.3* 2.3* 2.6 2.7 2.6  < > 3.8  < > 2.6  < >  --   --   --   --    PROTTOTAL  --   --   --   --   --   --   --   --  4.1*  --   --   --   --  6.0*  --  5.3*   ALBUMIN  --   --   --   --   --   --   --   --  2.2*  --  2.9*  --   --  3.3*  --  2.8*   BILITOTAL  --   --   --   --   --   --   --   --  0.2  --   --   --   --  0.5  --  0.4   ALKPHOS  --   --   --   --   --   --   --   --  98  --   --   --   --  136  --  121   AST  --   --   --   --   --   --   --   --  15  --   --   --   --  54*  --  38   ALT  --   --   --   --   --   --   --   --  25  --   --   --   --  37  --  24   < > = values in this interval not displayed.    INRNo lab results found in last 7 days.    Objective/Physical Exam:  Blood pressure (!) 157/91, pulse 93, temperature 97  F (36.1  C), temperature source Oral, resp. rate 20, height 4' 9.99\" (1.473 m), weight 115 lb 6.4 oz (52.3 kg), SpO2 91 %, not currently breastfeeding.    unchanged    Avis Kohli MD      " 88

## 2023-01-21 NOTE — H&P ADULT - HISTORY OF PRESENT ILLNESS
67 year-old right-handed male w/ PMHx epilepsy, developmental delay, AOx3, paranoid schizophrenia, HLD, HTN, h/o spinal surgery, ?CKD, VAZQUEZ (CPAP at night), who presents to [facility] ED due to three seizure-like episodes prior to arrival. Patient with sister at bedside providing collateral. Patient with seizure-like activity on night prior to arrival, another earlier in the day of arrival, and another en route to the ED with EMS. Patient's epilepsy had been controlled on levetiracetam, lamotrigine, and also reportedly gabapentin. Patient was on lamotrigine 100 mg BID, then, due to concern for polypharmacy, was titrated down to 100mg QD "a couple of months ago," and discontinued on 1/10/23. Patient was continued on levetiracetam 1,000mg BID and gabapentin 300mg BID, per sister. Patient follows with Dr. Lopez for outpatient Neurology. -  67 year-old right-handed male w/ PMHx epilepsy, developmental delay, AOx3, paranoid schizophrenia, HLD, HTN, h/o spinal surgery, ?CKD, VAZQUEZ (CPAP at night), who presents to [facility] ED due to three seizure-like episodes prior to arrival. Patient with sister at bedside providing collateral. Patient with seizure-like activity on night prior to arrival, another earlier in the day of arrival, and another en route to the ED with EMS. Patient's epilepsy had been controlled on levetiracetam, lamotrigine, and also reportedly gabapentin. Patient was on lamotrigine 100 mg BID, then, due to concern for polypharmacy, was titrated down to 100mg QD "a couple of months ago," and discontinued on 1/10/23. Patient was continued on levetiracetam 1,000mg BID and gabapentin 300mg BID, per sister. Patient follows with Dr. Lopez for outpatient Neurology.    While in the Ed, patient with witnessed GTC in the hallway lasting 30 seconds. (Please see ED provider note "Progress Note" section for summary of events in the ED prior to admission.)

## 2023-01-21 NOTE — ED PROVIDER NOTE - PROGRESS NOTE DETAILS
pt had another seizure  ,Kepra 1 g was given as well as ativan 1 mg ,neuro called case discussed /coming to see a pt Liborio Hernandes, PGY-3- Pt evaluated by myself on arrival to ED. Finger stick was unobtainable initially due to flex staff unable to scan for glucose and tech in another room. FS at time was unobtainable. Called CT tech due to concern for ich and multiple seizures with unknown and suspected fall (abrasions on body) and requested patient to have STAT CT head after IV access obtained. Keppra 1g given prior to transport. Patient was brought to CT by nursing staff and nursing staff confirmed with CT tech that patient will be getting CT right away. Nursing staff dropped off labs and patient was unknowingly waiting outside CT room. Pt was waiting for approximately 20 min without scan performed. Pt then had generalized tonic clonic seizure in hallway lasting 30 sec. Witnessed by physician from nearby section. Pt brought back to room on telemetry with End tidal CO2 monitoring post-ictal. Pt received 1 g Keppra prior to transport to CT. Pt then received 1 mg Ativan IVP after seizure. Pt now post-ictal. Neurology at bedside. Requesting 2 g Keppra IVP. Unavailable in ED so 2 g Keppra IVPB as fast as possible given. Still concern for ICH. Will get CT head as soon as patient is stable. Protecting airway at this time. Discussed with charge RN events that occurred and concern for patient safety. RN leadership to look into event further. Liborio Hernandes, PGY-3- Pt back from CT. Required 1 mg Ativan IVP x2 doses in CT in order to get scans. Neuro made aware. On hold with radiology who is reviewing pts scans now to eval for ich. Per rads, no bleed on quick eval, awaiting official report. Liborio Hernandes, PGY-3- no additional seizures in ED. Pt with likely atelectasis of L lung. Plan to repeat CXR. In no acute resp distress. Will be admitted to neurology service. Liborio Hernandes, PGY-3- pt admitted to neuro, now febrile. UA was just sent. Plan to add on RVP to swab. Unclear reason for fever. Neuro ok with holding off on abx until UA is resulted. If positive will treat. Liborio Hernandes, PGY-3- no additional seizures in ED. Pt with likely atelectasis of L lung. Plan to repeat CXR. In no acute resp distress. Will be admitted to neurology service. if no source of infection ,will need LP , in mean time will send a a culture and cover with ceftriaxone

## 2023-01-21 NOTE — CHART NOTE - NSCHARTNOTEFT_GEN_A_CORE
Brief update    Patient reported to have fever 101, recommended panculture (blood, urine, sputum culture), and lumbar puncture, empiric antibiotic coverage for CNS infection.     Discussed with Dr. Barreto and neurology resident on call   Bandar Reynoso MD  Epilepsy Fellow , [facility]  Department of Neurology, [employer]    [facility] EEG Reading Room Ph#: [phone]  Epilepsy Answering Service after 5PM and before 8:30AM: Ph#: [phone]

## 2023-01-21 NOTE — H&P ADULT - ASSESSMENT
INCOMPLETE    Assessment: ***    Impression: ***    Recommendations:     Diagnostics:  [] Follow-up AED levels, CBC, CMP, magnesium, phosphorous   [] Replete phosphorous overnight  [] Check urinalysis, chest XR, blood cultures  [] Video EEG  [] Follow-up CT head w/o contrast read    Medications:  [] s/p lacosamide 200mg IVP load  [] start maintenance lacosamide 150mg IVPB Q12H  [] s/p levetiracetam 1,000mg x1 and 2,000mg x 1 in the ED  [] "decreased" (renally-dosing) levetiracetam from 1,000mg BID to 750mg BID  [] First line: lorazepam 1mg IV PRN for seizure activity lasting >3-5mins, >2x/hr, >3x/day, or if GTC , repeat after 1 minute (max dose 0.1 mg/kg)    [] Second line: valproic acid 1,000mg IV PRN for seizure activity lasting >3-5mins, >2x/hr, >3x/day, or if GTC , repeat after 1 minute (max dose 0.1 mg/kg)        Management:   [] Seizure, fall and aspiration precautions. Avoid sleep deprivation.     [] Given concern for seizure, advise the patient with regards to risks and driving privileges associated with the [place] Guidelines. Advise patient regarding the risk of seizures and general seizure safety recommendations including not to be bathing alone, climbing to high places and operating heavy machinery, until cleared by follow-up outpatient Neurology. Reinforce the importance of compliance with medications. Discuss sleep hygiene and the risks of sleep disruption. Discuss the risk of death associated with seizures / SUDEP.     [] Please note: if patient has a convulsion, please document length of episode, specifically what patient was doing paying attention to eye opening vs closure, gaze deviation, shaking of extremities, tongue bite, urinary incontinence, any derangement of vital signs. Generalized motor seizures can have dilated and unreactive pupils, absent oculocephalic reflexes (no dolls eyes), and open eyelids (99% of cases). Head turning from sided to side, pelvic thrusting, bicycling movements, hand waving are NOT manifestations of generalized motor seizures. Assessment: 67 year-old right-handed male w/ PMHx epilepsy, developmental delay, AOx3, paranoid schizophrenia, HLD, HTN, h/o spinal surgery, ?CKD, VAZQUEZ (CPAP at night), who presents to [facility] ED due to three seizure-like episodes prior to arrival. Patient with sister at bedside providing collateral. Patient with seizure-like activity on night prior to arrival, another earlier in the day of arrival, and another en route to the ED with EMS. Patient's epilepsy had been controlled on levetiracetam, lamotrigine, and also reportedly gabapentin. Patient was on lamotrigine 100 mg BID, then, due to concern for polypharmacy, was titrated down to 100mg QD "a couple of months ago," and discontinued on 1/10/23. Patient was continued on levetiracetam 1,000mg BID and gabapentin 300mg BID, per sister. Patient follows with Dr. Lopez for outpatient Neurology. Per family, patient had not had a seizure in years.    Impression: Breakthrough seizures / status epilepticus in setting of recent discontinuation of lamotrigine. Suspect seizures provoked by AED discontinuation +/- infection.    Recommendations:     Diagnostics:  [] Follow-up AED levels, CBC, CMP, magnesium, phosphorous   [] Replete phosphorous overnight  [] Check urinalysis, CT chest, blood cultures  [] Video EEG  [] Follow-up CT head w/o contrast read    Medications:  [] s/p lacosamide 200mg IVP load  [] start maintenance lacosamide 150mg IVPB Q12H  [] s/p levetiracetam 1,000mg x1 and 2,000mg x 1 in the ED  [] "Decreased" levetiracetam from 1,000mg BID to 750mg BID (adjusted to account for SHAGUFTA on CKD)  [] First line: lorazepam 1mg IV PRN for seizure activity lasting >3-5mins, >2x/hr, >3x/day, or if GTC , repeat after 1 minute (max dose 0.1 mg/kg)    [] Second line: valproic acid 1,000mg IV PRN for seizure activity lasting >3-5mins, >2x/hr, >3x/day, or if GTC , repeat after 1 minute (max dose 0.1 mg/kg)      Management:   [] Call Respiratory Team overnight for help with CPAP (patient's sister does not have settings)  [] Would consult Pulmonology after CT chest - if pleural effusion, may need further evaluation  [] Consider Infectious Disease consult    [] Seizure, fall and aspiration precautions. Avoid sleep deprivation.   [] Given concern for seizure, advise the patient with regards to risks and driving privileges associated with the [place] Guidelines. Advise patient regarding the risk of seizures and general seizure safety recommendations including not to be bathing alone, climbing to high places and operating heavy machinery, until cleared by follow-up outpatient Neurology. Reinforce the importance of compliance with medications. Discuss sleep hygiene and the risks of sleep disruption. Discuss the risk of death associated with seizures / SUDEP.     [] Please note: if patient has a convulsion, please document length of episode, specifically what patient was doing paying attention to eye opening vs closure, gaze deviation, shaking of extremities, tongue bite, urinary incontinence, any derangement of vital signs. Generalized motor seizures can have dilated and unreactive pupils, absent oculocephalic reflexes (no dolls eyes), and open eyelids (99% of cases). Head turning from sided to side, pelvic thrusting, bicycling movements, hand waving are NOT manifestations of generalized motor seizures. Assessment: 67 year-old right-handed male w/ PMHx epilepsy, developmental delay, AOx3, paranoid schizophrenia, HLD, HTN, h/o spinal surgery, ?CKD, VAZQUEZ (CPAP at night), who presents to [facility] ED due to three seizure-like episodes prior to arrival. Patient with sister at bedside providing collateral. Patient with seizure-like activity on night prior to arrival, another earlier in the day of arrival, and another en route to the ED with EMS. Patient's epilepsy had been controlled on levetiracetam, lamotrigine, and also reportedly gabapentin. Patient was on lamotrigine 100 mg BID, then, due to concern for polypharmacy, was titrated down to 100mg QD "a couple of months ago," and discontinued on 1/10/23. Patient was continued on levetiracetam 1,000mg BID and gabapentin 300mg BID, per sister. Patient follows with Dr. Lopez for outpatient Neurology. Per family, patient had not had a seizure in years.    Impression: Breakthrough seizures / status epilepticus in setting of recent discontinuation of lamotrigine. Suspect seizures provoked by AED discontinuation +/- infection.    Recommendations:     Diagnostics:  [] Follow-up AED levels, CBC, CMP, magnesium, phosphorous   [] Replete phosphorous overnight  [] Check urinalysis, CT chest, blood cultures  [] Video EEG  [] Follow-up CT head w/o contrast read    Medications:  [] s/p lacosamide 200mg IVP load  [] start maintenance lacosamide 150mg IVPB Q12H  [] s/p levetiracetam 1,000mg x1 and 2,000mg x 1 in the ED  [] "Decreased" levetiracetam from 1,000mg BID to 750mg BID (adjusted to account for SHAGUFTA on CKD)  [] First line: lorazepam 1mg IV PRN for seizure activity lasting >3-5mins, >2x/hr, >3x/day, or if GTC , repeat after 1 minute (max dose 0.1 mg/kg)    [] Second line: valproic acid 1,000mg IV PRN for seizure activity lasting >3-5mins, >2x/hr, >3x/day, or if GTC , repeat after 1 minute (max dose 0.1 mg/kg)      Management:   [] Call Respiratory Team overnight for help with CPAP (patient's sister does not have settings)  [] Would consult Pulmonology after CT chest - if pleural effusion, may need further evaluation  [] Consider Infectious Disease consult    [] Seizure, fall and aspiration precautions. Avoid sleep deprivation.   [] Given concern for seizure, advise the patient with regards to risks and driving privileges associated with the [place] Guidelines. Advise patient regarding the risk of seizures and general seizure safety recommendations including not to be bathing alone, climbing to high places and operating heavy machinery, until cleared by follow-up outpatient Neurology. Reinforce the importance of compliance with medications. Discuss sleep hygiene and the risks of sleep disruption. Discuss the risk of death associated with seizures / SUDEP. Assessment: 67 year-old right-handed male w/ PMHx epilepsy, developmental delay, AOx3, paranoid schizophrenia, HLD, HTN, h/o spinal surgery, ?CKD, VAZQUEZ (CPAP at night), who presents to [facility] ED due to three seizure-like episodes prior to arrival. Patient with sister at bedside providing collateral. Patient with seizure-like activity on night prior to arrival, another earlier in the day of arrival, and another en route to the ED with EMS. Patient's epilepsy had been controlled on levetiracetam, lamotrigine, and also reportedly gabapentin. Patient was on lamotrigine 100 mg BID, then, due to concern for polypharmacy, was titrated down to 100mg QD "a couple of months ago," and discontinued on 1/10/23. Patient was continued on levetiracetam 1,000mg BID and gabapentin 300mg BID, per sister. Patient follows with Dr. Lopez for outpatient Neurology. Per family, patient had not had a seizure in years.    Impression: Breakthrough seizures / status epilepticus in setting of recent discontinuation of lamotrigine. Suspect seizures provoked by AED discontinuation +/- infection.    Recommendations:     Diagnostics:  [] Follow-up AED levels, CBC, CMP, magnesium, phosphorous   [] Replete phosphorous overnight  [] Check urinalysis, CT chest, blood cultures  [] Video EEG  [] Follow-up CT head w/o contrast read    Medications:  [] s/p lacosamide 200mg IVP load  [] start maintenance lacosamide 150mg IVPB Q12H  [] s/p levetiracetam 1,000mg x1 and 2,000mg x 1 in the ED  [] "Decreased" levetiracetam from 1,000mg BID to 750mg BID (adjusted to account for SHAGUFTA on CKD)  [] First line: lorazepam 1mg IV PRN for seizure activity lasting >3-5mins, >2x/hr, >3x/day, or if GTC , repeat after 1 minute (max dose 0.1 mg/kg)    [] Second line: valproic acid 1,000mg IV PRN for seizure activity lasting >3-5mins, >2x/hr, >3x/day, or if GTC , repeat after 1 minute (max dose 0.1 mg/kg)      Management:   [] Call Respiratory Team overnight for help with CPAP (patient's sister does not have settings)  [] Would consult Pulmonology after CT chest - if pleural effusion, may need further evaluation  [] Consider Infectious Disease consult  [] Would start maintenance IVF, particularly given CK > 800 and SHAGUFTA on CKD    [] Seizure, fall and aspiration precautions. Avoid sleep deprivation.   [] Given concern for seizure, advise the patient with regards to risks and driving privileges associated with the [place] Guidelines. Advise patient regarding the risk of seizures and general seizure safety recommendations including not to be bathing alone, climbing to high places and operating heavy machinery, until cleared by follow-up outpatient Neurology. Reinforce the importance of compliance with medications. Discuss sleep hygiene and the risks of sleep disruption. Discuss the risk of death associated with seizures / SUDEP. Assessment: 67 year-old right-handed male w/ PMHx epilepsy, developmental delay, AOx3, paranoid schizophrenia, HLD, HTN, h/o spinal surgery, ?CKD, VAZQUEZ (CPAP at night), who presents to [facility] ED due to three seizure-like episodes prior to arrival. Patient with sister at bedside providing collateral. Patient with seizure-like activity on night prior to arrival, another earlier in the day of arrival, and another en route to the ED with EMS. Patient's epilepsy had been controlled on levetiracetam, lamotrigine, and also reportedly gabapentin. Patient was on lamotrigine 100 mg BID, then, due to concern for polypharmacy, was titrated down to 100mg QD "a couple of months ago," and discontinued on 1/10/23. Patient was continued on levetiracetam 1,000mg BID and gabapentin 300mg BID, per sister. Patient follows with Dr. Lopez for outpatient Neurology. Per family, patient had not had a seizure in years.    Impression: Breakthrough seizures / status epilepticus in setting of recent discontinuation of lamotrigine. Suspect seizures provoked by AED discontinuation +/- infection.    Recommendations:     Diagnostics:  [] Follow-up AED levels, CBC, CMP, magnesium, phosphorous   [] Replete phosphorous overnight  [] Check urinalysis, CT chest, blood cultures  [] Would obtain lumbar puncture in the ED, if possible  [] Video EEG  [] Follow-up CT head w/o contrast read    Medications:  [] s/p lacosamide 200mg IVP load  [] start maintenance lacosamide 150mg IVPB Q12H  [] s/p levetiracetam 1,000mg x1 and 2,000mg x 1 in the ED  [] "Decreased" levetiracetam from 1,000mg BID to 750mg BID (adjusted to account for SHAGUFTA on CKD)  [] First line: lorazepam 1mg IV PRN for seizure activity lasting >3-5mins, >2x/hr, >3x/day, or if GTC , repeat after 1 minute (max dose 0.1 mg/kg)    [] Second line: valproic acid 1,000mg IV PRN for seizure activity lasting >3-5mins, >2x/hr, >3x/day, or if GTC , repeat after 1 minute (max dose 0.1 mg/kg)    [] Continue empiric antibiotic coverage    Management:   [] Call Respiratory Team overnight for help with CPAP (patient's sister does not have settings)  [] Would consult Pulmonology after CT chest - if pleural effusion, may need further evaluation  [] Infectious Disease consult  [] Would start maintenance IVF, particularly given CK > 800 and SHAGUFTA on CKD    [] Seizure, fall and aspiration precautions. Avoid sleep deprivation.   [] Given concern for seizure, advise the patient with regards to risks and driving privileges associated with the [place] Guidelines. Advise patient regarding the risk of seizures and general seizure safety recommendations including not to be bathing alone, climbing to high places and operating heavy machinery, until cleared by follow-up outpatient Neurology. Reinforce the importance of compliance with medications. Discuss sleep hygiene and the risks of sleep disruption. Discuss the risk of death associated with seizures / SUDEP.

## 2023-01-21 NOTE — H&P ADULT - NSHPPHYSICALEXAM_GEN_ALL_CORE
Constitutional: obesity, climbing out of bed to his left, some additional work of breathing  Eyes: ophthalmoscopic exam deferred secondary to COVID-19 pandemic  Cardiovascular: no pitting edema, warm-to-touch    Neurological Examination:    - Mental Status: Eyes closed, opens to light-to-moderate physical stimulation, does not attend to examiner; no discernable speech, does not name, does not repeat, rarely followed commands.    - Cranial Nerves: Eyes forcibly closed when attempting to test blink to threat; pupils are equal, round, about 5mm b/l, and reactive to light; extraocular movements cross the midline to oculocephalic maneuver, appears to have left preference at times; face is grossly symmetric at rest.    - Motor/Strength Testing:  - Seems to be full strength throughout as patient resists examiner.     - Sensory: Intact throughout to noxious stimuli  - Coordination: Unable to assess  - Gait: Unable to assess

## 2023-01-21 NOTE — ED PROVIDER NOTE - OBJECTIVE STATEMENT
Liborio Greta, PGY-3- 67-year-old male with past medical history of seizure disorder, coming in from home for evaluation of 3 seizures.  Per sister, patient had seizure last night, earlier today and 1 in route with EMS.  Patient was recently taken off his Lamictal due to concern for polypharmacy from his neurologist.  Patient currently takes Keppra 1000 mg twice daily and sister reports Neurontin 300 mg twice daily for seizure disorder.  Patient able to answer yes or no questions but is unable to provide additional history.  Unable to provide his name on evaluation in the ED.  Concern for postictal period.   Also concern for head strike as patient with abrasions on body. No hx of substance misuse per sister.

## 2023-01-21 NOTE — ED ADULT NURSE REASSESSMENT NOTE - NS ED NURSE REASSESS COMMENT FT1
PT straight catheterized under sterile technique with 2 RN's at the bedside as per MD order. PT tolerated well. Approximately 500 mL clear, yellow urine drained. UA/UC sent as per MD order. Safety and comfort maintained. Call bell within reach.
Report received from Genaro Morales RN. PT seized in CT prior to shift change and was returned to [facility] without having CT's performed. Genaro Morales giving bedside report. Neurology at bedside. PT receiving Keppra per MD orders. PT to be placed on continuous pulse oximetry and cardiac monitor and returned to CT with RN and EDT. PT to be pre-medicated with Vimpat per MD orders prior to going to CT. PT taken back to CT with LEIGH Moore at this time while primary RN receives report on remaining PT's.
Pt transported to CT scan as per MD request and okay to leave pt without RN. CT made aware pt brought for scan. Pt returned from CT s/p seizure like activity, not able to complete scan as per CT. RN Maribell administered 1mg ativan as per MD emergent request. Pt brought to room with monitor. Glucose obtained 153.

## 2023-01-21 NOTE — H&P ADULT - NSHPADDITIONALINFOADULT_GEN_ALL_CORE
66yo RH male w/ epilepsy (neuro: Dr. mccollum), developmental delay, AOx3, paranoid schizophrenia, HLD, HTN, ?CKD, VAZQUEZ (CPAP at night), who presents with 3 BTC sz’s after recent tapering of LTG by PCP because concern for polypharmacy. Home ASM: LEV 1000mg BID, LTG 100mg BID, GBP 300mg BID (unclear if this is for sz VS neuropathy).    Plan:  s/p LCM and LEV loads in ER  LEV dosage reduced d/t CKD  continue LCM 150mg BID (CT interval 156ms)  febrile at 101.7 in ER, CXR with L pleural effusion VS atelectasis, pending CT chest

## 2023-01-21 NOTE — H&P ADULT - NSHPSOCIALHISTORY_GEN_ALL_CORE
Lives by himself, has some help at home with aides, but does his own grocery shopping and performs his ADLs, per patient's sister.  Unemployed for years, on disability.

## 2023-01-21 NOTE — ED PROVIDER NOTE - PHYSICAL EXAMINATION
General: well appearing, no acute distress, alert to self   Skin: no rash, no pallor, abrasion over L elbow   Head: normocephalic, atraumatic  Eyes: clear conjunctiva, EOMI, PERRL   ENMT: airway patent, no nasal discharge, does not cooperate to stick out tongue   Cardiovascular: normal rate, normal rhythm, S1/S2  Pulmonary: clear to auscultation bilaterally, no rales, rhonchi, or wheeze  Abdomen: soft, nontender  Musculoskeletal: moving extremities well, no deformity  Neuro: limited exam 2/2 likely postictal period, moving all extremities, non focal  Psych: normal mood, normal affect

## 2023-01-21 NOTE — ED ADULT NURSE NOTE - NSIMPLEMENTINTERV_GEN_ALL_ED
Implemented All Fall Risk Interventions:  [place] to call system. Call bell, personal items and telephone within reach. Instruct patient to call for assistance. Room bathroom lighting operational. Non-slip footwear when patient is off stretcher. Physically safe environment: no spills, clutter or unnecessary equipment. Stretcher in lowest position, wheels locked, appropriate side rails in place. Provide visual cue, wrist band, yellow gown, etc. Monitor gait and stability. Monitor for mental status changes and reorient to person, place, and time. Review medications for side effects contributing to fall risk. Reinforce activity limits and safety measures with patient and family.

## 2023-01-21 NOTE — ED PROVIDER NOTE - DIFFERENTIAL DIAGNOSIS
DDx includes but is not limited to- ich, seizure disorder, infection, med non adherence Differential Diagnosis

## 2023-01-21 NOTE — ED PROVIDER NOTE - CHIEF COMPLAINT
Health Maintenance Due   Topic Date Due   • COVID-19 Vaccine (3 - Pfizer risk 4-dose series) 04/19/2021   • Medicare Advantage- Medicare Wellness Visit  01/01/2022   • Diabetes A1C  03/09/2022   • Depression Screening  09/02/2022       Patient is due for topics as listed above but is not proceeding with Immunization(s) COVID-19 at this time.    The patient is a 67y Male complaining of

## 2023-01-22 LAB
ANION GAP SERPL CALC-SCNC: 14 MMOL/L — SIGNIFICANT CHANGE UP (ref 5–17)
BASOPHILS # BLD AUTO: 0.03 K/UL — SIGNIFICANT CHANGE UP (ref 0–0.2)
BASOPHILS NFR BLD AUTO: 0.2 % — SIGNIFICANT CHANGE UP (ref 0–2)
BUN SERPL-MCNC: 15 MG/DL — SIGNIFICANT CHANGE UP (ref 7–23)
CALCIUM SERPL-MCNC: 8.8 MG/DL — SIGNIFICANT CHANGE UP (ref 8.4–10.5)
CHLORIDE SERPL-SCNC: 108 MMOL/L — SIGNIFICANT CHANGE UP (ref 96–108)
CO2 SERPL-SCNC: 19 MMOL/L — LOW (ref 22–31)
CREAT SERPL-MCNC: 1.66 MG/DL — HIGH (ref 0.5–1.3)
EGFR: 45 ML/MIN/1.73M2 — LOW
EOSINOPHIL # BLD AUTO: 0 K/UL — SIGNIFICANT CHANGE UP (ref 0–0.5)
EOSINOPHIL NFR BLD AUTO: 0 % — SIGNIFICANT CHANGE UP (ref 0–6)
GLUCOSE BLDC GLUCOMTR-MCNC: 95 MG/DL — SIGNIFICANT CHANGE UP (ref 70–99)
GLUCOSE BLDC GLUCOMTR-MCNC: 97 MG/DL — SIGNIFICANT CHANGE UP (ref 70–99)
GLUCOSE SERPL-MCNC: 87 MG/DL — SIGNIFICANT CHANGE UP (ref 70–99)
HCT VFR BLD CALC: 41.6 % — SIGNIFICANT CHANGE UP (ref 39–50)
HCT VFR BLD CALC: 42.1 % — SIGNIFICANT CHANGE UP (ref 39–50)
HCV AB S/CO SERPL IA: 0.05 S/CO — SIGNIFICANT CHANGE UP (ref 0–0.99)
HCV AB SERPL-IMP: SIGNIFICANT CHANGE UP
HGB BLD-MCNC: 13.4 G/DL — SIGNIFICANT CHANGE UP (ref 13–17)
HGB BLD-MCNC: 13.7 G/DL — SIGNIFICANT CHANGE UP (ref 13–17)
IMM GRANULOCYTES NFR BLD AUTO: 0.4 % — SIGNIFICANT CHANGE UP (ref 0–0.9)
LYMPHOCYTES # BLD AUTO: 0.57 K/UL — LOW (ref 1–3.3)
LYMPHOCYTES # BLD AUTO: 4.3 % — LOW (ref 13–44)
MCHC RBC-ENTMCNC: 30.4 PG — SIGNIFICANT CHANGE UP (ref 27–34)
MCHC RBC-ENTMCNC: 30.5 PG — SIGNIFICANT CHANGE UP (ref 27–34)
MCHC RBC-ENTMCNC: 32.2 GM/DL — SIGNIFICANT CHANGE UP (ref 32–36)
MCHC RBC-ENTMCNC: 32.5 GM/DL — SIGNIFICANT CHANGE UP (ref 32–36)
MCV RBC AUTO: 93.8 FL — SIGNIFICANT CHANGE UP (ref 80–100)
MCV RBC AUTO: 94.3 FL — SIGNIFICANT CHANGE UP (ref 80–100)
MONOCYTES # BLD AUTO: 0.87 K/UL — SIGNIFICANT CHANGE UP (ref 0–0.9)
MONOCYTES NFR BLD AUTO: 6.6 % — SIGNIFICANT CHANGE UP (ref 2–14)
NEUTROPHILS # BLD AUTO: 11.66 K/UL — HIGH (ref 1.8–7.4)
NEUTROPHILS NFR BLD AUTO: 88.5 % — HIGH (ref 43–77)
NRBC # BLD: 0 /100 WBCS — SIGNIFICANT CHANGE UP (ref 0–0)
NRBC # BLD: 0 /100 WBCS — SIGNIFICANT CHANGE UP (ref 0–0)
PLATELET # BLD AUTO: 165 K/UL — SIGNIFICANT CHANGE UP (ref 150–400)
PLATELET # BLD AUTO: 187 K/UL — SIGNIFICANT CHANGE UP (ref 150–400)
POTASSIUM SERPL-MCNC: 3.6 MMOL/L — SIGNIFICANT CHANGE UP (ref 3.5–5.3)
POTASSIUM SERPL-SCNC: 3.6 MMOL/L — SIGNIFICANT CHANGE UP (ref 3.5–5.3)
PROLACTIN SERPL-MCNC: 5.2 NG/ML — SIGNIFICANT CHANGE UP (ref 4.1–18.4)
RAPID RVP RESULT: SIGNIFICANT CHANGE UP
RBC # BLD: 4.41 M/UL — SIGNIFICANT CHANGE UP (ref 4.2–5.8)
RBC # BLD: 4.49 M/UL — SIGNIFICANT CHANGE UP (ref 4.2–5.8)
RBC # FLD: 12.8 % — SIGNIFICANT CHANGE UP (ref 10.3–14.5)
RBC # FLD: 13 % — SIGNIFICANT CHANGE UP (ref 10.3–14.5)
SARS-COV-2 RNA SPEC QL NAA+PROBE: SIGNIFICANT CHANGE UP
SODIUM SERPL-SCNC: 141 MMOL/L — SIGNIFICANT CHANGE UP (ref 135–145)
WBC # BLD: 13.18 K/UL — HIGH (ref 3.8–10.5)
WBC # BLD: 9.88 K/UL — SIGNIFICANT CHANGE UP (ref 3.8–10.5)
WBC # FLD AUTO: 13.18 K/UL — HIGH (ref 3.8–10.5)
WBC # FLD AUTO: 9.88 K/UL — SIGNIFICANT CHANGE UP (ref 3.8–10.5)

## 2023-01-22 PROCEDURE — 95720 EEG PHY/QHP EA INCR W/VEEG: CPT

## 2023-01-22 PROCEDURE — 71250 CT THORAX DX C-: CPT | Mod: 26

## 2023-01-22 PROCEDURE — 93010 ELECTROCARDIOGRAM REPORT: CPT

## 2023-01-22 RX ORDER — ACETAMINOPHEN 500 MG
1000 TABLET ORAL ONCE
Refills: 0 | Status: COMPLETED | OUTPATIENT
Start: 2023-01-22 | End: 2023-01-22

## 2023-01-22 RX ORDER — HYDRALAZINE HCL 50 MG
10 TABLET ORAL ONCE
Refills: 0 | Status: COMPLETED | OUTPATIENT
Start: 2023-01-22 | End: 2023-01-22

## 2023-01-22 RX ORDER — ESCITALOPRAM OXALATE 10 MG/1
15 TABLET, FILM COATED ORAL DAILY
Refills: 0 | Status: DISCONTINUED | OUTPATIENT
Start: 2023-01-22 | End: 2023-01-25

## 2023-01-22 RX ORDER — HYDRALAZINE HCL 50 MG
10 TABLET ORAL THREE TIMES A DAY
Refills: 0 | Status: DISCONTINUED | OUTPATIENT
Start: 2023-01-22 | End: 2023-01-22

## 2023-01-22 RX ORDER — ACETAMINOPHEN 500 MG
1000 TABLET ORAL ONCE
Refills: 0 | Status: DISCONTINUED | OUTPATIENT
Start: 2023-01-22 | End: 2023-01-22

## 2023-01-22 RX ORDER — LISINOPRIL 2.5 MG/1
40 TABLET ORAL DAILY
Refills: 0 | Status: DISCONTINUED | OUTPATIENT
Start: 2023-01-22 | End: 2023-01-25

## 2023-01-22 RX ORDER — SODIUM CHLORIDE 9 MG/ML
1000 INJECTION INTRAMUSCULAR; INTRAVENOUS; SUBCUTANEOUS
Refills: 0 | Status: DISCONTINUED | OUTPATIENT
Start: 2023-01-22 | End: 2023-01-23

## 2023-01-22 RX ORDER — GABAPENTIN 400 MG/1
300 CAPSULE ORAL
Refills: 0 | Status: DISCONTINUED | OUTPATIENT
Start: 2023-01-22 | End: 2023-01-25

## 2023-01-22 RX ORDER — HYDRALAZINE HCL 50 MG
10 TABLET ORAL EVERY 4 HOURS
Refills: 0 | Status: DISCONTINUED | OUTPATIENT
Start: 2023-01-22 | End: 2023-01-24

## 2023-01-22 RX ORDER — LURASIDONE HYDROCHLORIDE 40 MG/1
40 TABLET ORAL AT BEDTIME
Refills: 0 | Status: DISCONTINUED | OUTPATIENT
Start: 2023-01-22 | End: 2023-01-25

## 2023-01-22 RX ORDER — ATORVASTATIN CALCIUM 80 MG/1
20 TABLET, FILM COATED ORAL AT BEDTIME
Refills: 0 | Status: DISCONTINUED | OUTPATIENT
Start: 2023-01-22 | End: 2023-01-25

## 2023-01-22 RX ADMIN — LEVETIRACETAM 400 MILLIGRAM(S): 250 TABLET, FILM COATED ORAL at 05:31

## 2023-01-22 RX ADMIN — Medication 400 MILLIGRAM(S): at 18:09

## 2023-01-22 RX ADMIN — SODIUM CHLORIDE 75 MILLILITER(S): 9 INJECTION INTRAMUSCULAR; INTRAVENOUS; SUBCUTANEOUS at 03:43

## 2023-01-22 RX ADMIN — LACOSAMIDE 130 MILLIGRAM(S): 50 TABLET ORAL at 01:07

## 2023-01-22 RX ADMIN — Medication 400 MILLIGRAM(S): at 23:58

## 2023-01-22 RX ADMIN — SODIUM CHLORIDE 75 MILLILITER(S): 9 INJECTION INTRAMUSCULAR; INTRAVENOUS; SUBCUTANEOUS at 18:30

## 2023-01-22 RX ADMIN — Medication 1000 MILLIGRAM(S): at 18:40

## 2023-01-22 RX ADMIN — Medication 10 MILLIGRAM(S): at 16:43

## 2023-01-22 RX ADMIN — Medication 10 MILLIGRAM(S): at 22:26

## 2023-01-22 RX ADMIN — Medication 1000 MILLIGRAM(S): at 10:00

## 2023-01-22 RX ADMIN — LEVETIRACETAM 400 MILLIGRAM(S): 250 TABLET, FILM COATED ORAL at 18:29

## 2023-01-22 RX ADMIN — CEFTRIAXONE 100 MILLIGRAM(S): 500 INJECTION, POWDER, FOR SOLUTION INTRAMUSCULAR; INTRAVENOUS at 21:25

## 2023-01-22 RX ADMIN — LACOSAMIDE 130 MILLIGRAM(S): 50 TABLET ORAL at 13:26

## 2023-01-22 RX ADMIN — Medication 400 MILLIGRAM(S): at 09:25

## 2023-01-22 NOTE — PROGRESS NOTE ADULT - SUBJECTIVE AND OBJECTIVE BOX
Neurology - Consult Note    -  Spectra: [phone] ([facility]), [phone] ([facility])  -    Interval history:  Fever 101.7 in ED. CXR with left pleural effusion vs atelectasis, CT chest pending, consider pulmonology consult. Elevated CK, downtrending. Started NS @ 75/hr      Review of Systems:   CONSTITUTIONAL: No fevers or chills  EYES AND ENT: No visual changes or no throat pain   NECK: No pain or stiffness  RESPIRATORY: No hemoptysis or shortness of breath  NEUROLOGICAL: +As stated in HPI above  SKIN: No itching, burning, rashes, or lesions   All other review of systems is negative unless indicated above.    Allergies:  penicillin (Hives)      PMHx/PSHx/Family Hx: As above, otherwise see below   Hypertension, unspecified type    Other hyperlipidemia    History of epilepsy    Paranoid schizophrenia    Obstructive sleep apnea        Social Hx:  No current use of tobacco, alcohol, or illicit drugs    Medications:  MEDICATIONS  (STANDING):  atorvastatin 20 milliGRAM(s) Oral at bedtime  cefTRIAXone   IVPB 2000 milliGRAM(s) IV Intermittent every 24 hours  escitalopram Solution 15 milliGRAM(s) Oral daily  gabapentin 300 milliGRAM(s) Oral two times a day  lacosamide IVPB 150 milliGRAM(s) IV Intermittent every 12 hours  levETIRAcetam  IVPB 750 milliGRAM(s) IV Intermittent every 12 hours  lisinopril 40 milliGRAM(s) Oral daily  lurasidone 40 milliGRAM(s) Oral at bedtime  sodium chloride 0.9%. 1000 milliLiter(s) (75 mL/Hr) IV Continuous <Continuous>    MEDICATIONS  (PRN):  LORazepam   Injectable 1 milliGRAM(s) IV Push once PRN Seizure Activity  valproate sodium  IVPB 1000 milliGRAM(s) IV Intermittent once PRN Seizure Activity      Vitals:  T(C): 36.8 (01-22-23 @ 08:51), Max: 38.7 (01-21-23 @ 22:10)  HR: 71 (01-22-23 @ 08:51) (71 - 102)  BP: 145/75 (01-22-23 @ 08:51) (141/108 - 189/134)  RR: 18 (01-22-23 @ 08:51) (15 - 19)  SpO2: 99% (01-22-23 @ 08:51) (94% - 100%)    Physical Examination:  Constitutional: obesity, Eyes: ophthalmoscopic exam deferred secondary to COVID-19 pandemic  Cardiovascular: no pitting edema, warm-to-touch    Neurological Examination:    - Mental Status: Eyes closed, opens to light-to-moderate physical stimulation, does not attend to examiner; no discernable speech, does not name, does not repeat, rarely followed commands.    - Cranial Nerves: Eyes forcibly closed when attempting to test blink to threat; pupils are equal, round, about 5mm b/l, and reactive to light; extraocular movements cross the midline to oculocephalic maneuver, appears to have left preference at times; face is grossly symmetric at rest.    - Motor/Strength Testing:  - Seems to be full strength throughout as patient resists examiner.     - Sensory: Intact throughout to noxious stimuli  - Coordination: Unable to assess  - Gait: Unable to assess    Labs:                        13.7   9.88  )-----------( 165      ( 22 Jan 2023 07:26 )             42.1     01-22    141  |  108  |  15  ----------------------------<  87  3.6   |  19<L>  |  1.66<H>    Ca    8.8      22 Jan 2023 07:27  Phos  2.3     01-21  Mg     2.3     01-21    TPro  6.2  /  Alb  3.7  /  TBili  0.6  /  DBili  x   /  AST  42<H>  /  ALT  24  /  AlkPhos  85  01-21    CAPILLARY BLOOD GLUCOSE      POCT Blood Glucose.: 95 mg/dL (22 Jan 2023 11:29)    LIVER FUNCTIONS - ( 21 Jan 2023 22:58 )  Alb: 3.7 g/dL / Pro: 6.2 g/dL / ALK PHOS: 85 U/L / ALT: 24 U/L / AST: 42 U/L / GGT: x               CSF:                  Radiology:  CT Head No Cont (01.21.23 @ 20:39)    IMPRESSION:  1. No acute intracranial hemorrhage, territorial infarct, mass effect or   calvarial fracture.  2. No cervical spine fracture or traumatic spondylolisthesis.  3. Right thyroid lobe nodule. A nonemergent thyroid ultrasound is   recommended for further evaluation.    ---  Xray Chest 1 View AP/PA (01.21.23 @ 21:14)    IMPRESSION:  Diffuse hazy opacification of the left hemithorax, may be due to layering   pleural effusion and/or atelectasis. The right lung is clear.

## 2023-01-22 NOTE — CONSULT NOTE ADULT - ASSESSMENT
66 yo M PMHx epilepsy, developmental delay, paranoid schizophrenia, HLD, HTN, h/o spinal surgery, CKD, VAZQUEZ (CPAP at night), who presents to [facility] ED due to three seizure-like episodes prior to arrival. medicine consult for co management.    # Breakthrough seizures / status epilepticus  # HTN, HLD  # CKD  # VAZQUEZ  fever 101.7, empiric ceftriaxone, fu urine and blood cx  LP per neuro?  CT chest ro pleural effusion  AED per neuro, lacosamide 150mg IVPB Q12H, keppra  cont lisinopril, if BP remains elevated, can start norvasc 5mg qd  cont statin    dvt ppx per primary team    Please call [facility] with questions [phone].

## 2023-01-22 NOTE — CONSULT NOTE ADULT - SUBJECTIVE AND OBJECTIVE BOX
Patient is a 67y old  Male who presents with a chief complaint of Seizures after discontinuation of lamotrigine outpatient (2023 22:16)      HPI:  -  67 year-old right-handed male w/ PMHx epilepsy, developmental delay, AOx3, paranoid schizophrenia, HLD, HTN, h/o spinal surgery, ?CKD, VAZQUEZ (CPAP at night), who presents to [facility] ED due to three seizure-like episodes prior to arrival. Patient with sister at bedside providing collateral. Patient with seizure-like activity on night prior to arrival, another earlier in the day of arrival, and another en route to the ED with EMS. Patient's epilepsy had been controlled on levetiracetam, lamotrigine, and also reportedly gabapentin. Patient was on lamotrigine 100 mg BID, then, due to concern for polypharmacy, was titrated down to 100mg QD "a couple of months ago," and discontinued on 1/10/23. Patient was continued on levetiracetam 1,000mg BID and gabapentin 300mg BID, per sister. Patient follows with Dr. Lopez for outpatient Neurology.    While in the Ed, patient with witnessed GTC in the hallway lasting 30 seconds. (Please see ED provider note "Progress Note" section for summary of events in the ED prior to admission.) (2023 22:16)      PAST MEDICAL & SURGICAL HISTORY:  Hypertension, unspecified type      Other hyperlipidemia      History of epilepsy      Paranoid schizophrenia      Obstructive sleep apnea      H/O Spinal surgery          FAMILY HISTORY:      SOCIAL HISTORY:    Allergies    penicillin (Hives)    Intolerances        Review of Systems:    General:	Denies fatigue, fevers, chills, sweats, decreased appetite.    Skin/Breast: denies pruritis, rash  	  Ophthalmologic: Denies change in vision or blurring  	  HEENT	Denies dry mouth, oral sores, dysphagia,  change in hearing.    Respiratory and Thorax:  Denies cough, sob, wheeze, hemoptysis  	  Cardiovascular:	Denies cp , palp, orthopnea    Gastrointestinal:	Denies n/v/d constipation    Genitourinary:	Denies dysuria of frequency, hematuria, flank pain    Musculoskeletal:	Denies bone or joint pain, muscle aches.     Neurological:	 Denies change in sensory or motor function, weakness, + headache    Psychiatric:	Denies depression, anxiety, insomnia.     Hematology/Lymphatics: Denies bleeding or bruising  	    SUBJECTIVE / OVERNIGHT EVENTS:  pt seen and examined. AAOX3. no dizziness confusion cp sob abd pain nvd. co HA. no fevers or chills at home. no cough.      Vital Signs Last 24 Hrs  T(C): 36.8 (2023 08:51), Max: 38.7 (2023 22:10)  T(F): 98.2 (2023 08:51), Max: 101.7 (2023 22:10)  HR: 71 (2023 08:51) (71 - 102)  BP: 145/75 (2023 08:51) (141/108 - 189/134)  BP(mean): 115 (2023 22:30) (109 - 115)  RR: 18 (2023 08:51) (15 - 19)  SpO2: 99% (2023 08:51) (94% - 100%)    Parameters below as of 2023 08:51  Patient On (Oxygen Delivery Method): nasal cannula      I&O's Summary    2023 07:01  -  2023 07:00  --------------------------------------------------------  IN: 0 mL / OUT: 450 mL / NET: -450 mL        PHYSICAL EXAM:  GENERAL: NAD, Comfortable  HEAD:  Atraumatic, Normocephalic  EYES: conjunctiva and sclera clear  NECK: Supple, No JVD  CHEST/LUNG: Clear to auscultation bilaterally; No wheeze  HEART: Regular rate and rhythm; No murmur  ABDOMEN: Soft, Nontender, Nondistended; Bowel sounds present  Neuro: AAOx3, no focal deficit, 5/5 b/l extremities  EXTREMITIES:  2+ Peripheral Pulses, No edema  SKIN: No rashes or lesions    LABS:                        13.7   9.88  )-----------( 165      ( 2023 07:26 )             42.1         141  |  108  |  15  ----------------------------<  87  3.6   |  19<L>  |  1.66<H>    Ca    8.8      2023 07:27  Phos  2.3       Mg     2.3         TPro  6.2  /  Alb  3.7  /  TBili  0.6  /  DBili  x   /  AST  42<H>  /  ALT  24  /  AlkPhos  85        CAPILLARY BLOOD GLUCOSE      POCT Blood Glucose.: 153 mg/dL (2023 18:44)    CARDIAC MARKERS ( 2023 22:58 )  x     / x     / 808 U/L / x     / x      CARDIAC MARKERS ( 2023 18:22 )  x     / x     / 888 U/L / x     / x          Urinalysis Basic - ( 2023 22:18 )    Color: Yellow / Appearance: Clear / S.023 / pH: x  Gluc: x / Ketone: Small  / Bili: Negative / Urobili: Negative   Blood: x / Protein: >600 / Nitrite: Negative   Leuk Esterase: Negative / RBC: 1 /hpf / WBC 11 /HPF   Sq Epi: x / Non Sq Epi: 8 /hpf / Bacteria: Negative        RADIOLOGY & ADDITIONAL TESTS:    Imaging Personally Reviewed:  [x] YES  [ ] NO    Consultant(s) Notes Reviewed:  [x] YES  [ ] NO      MEDICATIONS  (STANDING):  atorvastatin 20 milliGRAM(s) Oral at bedtime  cefTRIAXone   IVPB 2000 milliGRAM(s) IV Intermittent every 24 hours  escitalopram Solution 15 milliGRAM(s) Oral daily  gabapentin 300 milliGRAM(s) Oral two times a day  lacosamide IVPB 150 milliGRAM(s) IV Intermittent every 12 hours  levETIRAcetam  IVPB 750 milliGRAM(s) IV Intermittent every 12 hours  lisinopril 40 milliGRAM(s) Oral daily  lurasidone 40 milliGRAM(s) Oral at bedtime  sodium chloride 0.9%. 1000 milliLiter(s) (75 mL/Hr) IV Continuous <Continuous>    MEDICATIONS  (PRN):  LORazepam   Injectable 1 milliGRAM(s) IV Push once PRN Seizure Activity  valproate sodium  IVPB 1000 milliGRAM(s) IV Intermittent once PRN Seizure Activity      Care Discussed with Consultants/Other Providers [x] YES  [ ] NO    HEALTH ISSUES - PROBLEM Dx:      
Neurology - Consult Note - 01-21-23  -  Tj Clark MD  PGY-3 Neurology  Spectra: [id number] ([facility]), [id number] ([facility])  -    Name: OSCAR MILAN; 67y (1955)    Reason for Admission:     Chief Complaint:     HPI:      Review of Systems:  INCOMPLETE   CONSTITUTIONAL: No fevers or chills  EYES/ENT: No visual changes or no throat pain   NECK: No pain or stiffness  RESPIRATORY: No hemoptysis or shortness of breath  CARDIOVASCULAR: No chest pain or palpitations  GASTROINTESTINAL: No melena or hematochezia  GENITOURINARY: No dysuria or hematuria  NEUROLOGICAL: +As stated in HPI above  SKIN: No itching, burning, rashes, or lesions   All other review of systems is negative unless indicated above.    Allergies:  penicillin (Hives)      PMHx:   Hypertension, unspecified type    Other hyperlipidemia      PFHx:     PSuHx:       Medications:  MEDICATIONS  (STANDING):    MEDICATIONS  (PRN):      Vitals:  T(C): 37.6 (01-21-23 @ 17:39), Max: 37.6 (01-21-23 @ 17:39)  HR: 88 (01-21-23 @ 17:39) (88 - 88)  BP: 189/134 (01-21-23 @ 17:39) (189/134 - 189/134)  RR: 16 (01-21-23 @ 17:39) (16 - 16)  SpO2: 98% (01-21-23 @ 17:39) (98% - 98%)    Physical Examination: INCOMPLETE  ***    Labs:                        16.4   15.26 )-----------( 203      ( 21 Jan 2023 18:22 )             49.1           CAPILLARY BLOOD GLUCOSE         Radiology:

## 2023-01-22 NOTE — PROGRESS NOTE ADULT - ASSESSMENT
Assessment: 67 year-old right-handed male w/ PMHx epilepsy, developmental delay, AOx3, paranoid schizophrenia, HLD, HTN, h/o spinal surgery, ?CKD, VAZQUEZ (CPAP at night), who presents to [facility] ED due to three seizure-like episodes prior to arrival. Patient with sister at bedside providing collateral. Patient with seizure-like activity on night prior to arrival, another earlier in the day of arrival, and another en route to the ED with EMS. Patient's epilepsy had been controlled on levetiracetam, lamotrigine, and also reportedly gabapentin. Patient was on lamotrigine 100 mg BID, then, due to concern for polypharmacy, was titrated down to 100mg QD "a couple of months ago," and discontinued on 1/10/23. Patient was continued on levetiracetam 1,000mg BID and gabapentin 300mg BID, per sister. Patient follows with Dr. Lopez for outpatient Neurology. Per family, patient had not had a seizure in years.    Impression: Breakthrough seizures / status epilepticus in setting of recent discontinuation of lamotrigine. Suspect seizures provoked by AED discontinuation +/- infection.    Recommendations:     Diagnostics:  [] Follow-up AED levels, CBC, CMP, magnesium, phosphorous   [] Replete phosphorous overnight  [] Check urinalysis, CT chest, blood cultures  [] Would obtain lumbar puncture in the ED, if possible  [] Video EEG  [] Follow-up CT head w/o contrast read    Medications:  [x] s/p lacosamide 200mg IVP load  [x] s/p levetiracetam 1,000mg x1 and 2,000mg x 1 in the ED  [x] start maintenance lacosamide 150mg IVPB Q12H  [x] "Decreased" levetiracetam from 1,000mg BID to 750mg BID (adjusted to account for SHAGUFTA on CKD)  [] First line: lorazepam 1mg IV PRN for seizure activity lasting >3-5mins, >2x/hr, >3x/day, or if GTC , repeat after 1 minute (max dose 0.1 mg/kg)    [] Second line: valproic acid 1,000mg IV PRN for seizure activity lasting >3-5mins, >2x/hr, >3x/day, or if GTC , repeat after 1 minute (max dose 0.1 mg/kg)    [] Continue empiric antibiotic coverage    Management:   [] Call Respiratory Team overnight for help with CPAP (patient's sister does not have settings)  [] Would consult Pulmonology after CT chest - if pleural effusion, may need further evaluation  [] Infectious Disease consult  [] Would start maintenance IVF, particularly given CK > 800 and SHAGUFTA on CKD    [] Seizure, fall and aspiration precautions. Avoid sleep deprivation.   [] Given concern for seizure, advise the patient with regards to risks and driving privileges associated with the [place] Guidelines. Advise patient regarding the risk of seizures and general seizure safety recommendations including not to be bathing alone, climbing to high places and operating heavy machinery, until cleared by follow-up outpatient Neurology. Reinforce the importance of compliance with medications. Discuss sleep hygiene and the risks of sleep disruption. Discuss the risk of death associated with seizures / SUDEP.

## 2023-01-23 PROCEDURE — 99232 SBSQ HOSP IP/OBS MODERATE 35: CPT

## 2023-01-23 PROCEDURE — 95720 EEG PHY/QHP EA INCR W/VEEG: CPT

## 2023-01-23 RX ORDER — LEVETIRACETAM 250 MG/1
1000 TABLET, FILM COATED ORAL EVERY 12 HOURS
Refills: 0 | Status: DISCONTINUED | OUTPATIENT
Start: 2023-01-23 | End: 2023-01-24

## 2023-01-23 RX ORDER — ACETAMINOPHEN 500 MG
1000 TABLET ORAL ONCE
Refills: 0 | Status: COMPLETED | OUTPATIENT
Start: 2023-01-23 | End: 2023-01-23

## 2023-01-23 RX ORDER — LACOSAMIDE 50 MG/1
150 TABLET ORAL EVERY 12 HOURS
Refills: 0 | Status: DISCONTINUED | OUTPATIENT
Start: 2023-01-23 | End: 2023-01-23

## 2023-01-23 RX ORDER — KETOROLAC TROMETHAMINE 30 MG/ML
30 SYRINGE (ML) INJECTION ONCE
Refills: 0 | Status: DISCONTINUED | OUTPATIENT
Start: 2023-01-23 | End: 2023-01-23

## 2023-01-23 RX ORDER — METOPROLOL TARTRATE 50 MG
5 TABLET ORAL EVERY 6 HOURS
Refills: 0 | Status: DISCONTINUED | OUTPATIENT
Start: 2023-01-23 | End: 2023-01-23

## 2023-01-23 RX ORDER — AMLODIPINE BESYLATE 2.5 MG/1
5 TABLET ORAL DAILY
Refills: 0 | Status: DISCONTINUED | OUTPATIENT
Start: 2023-01-23 | End: 2023-01-25

## 2023-01-23 RX ORDER — ENOXAPARIN SODIUM 100 MG/ML
40 INJECTION SUBCUTANEOUS EVERY 24 HOURS
Refills: 0 | Status: DISCONTINUED | OUTPATIENT
Start: 2023-01-23 | End: 2023-01-25

## 2023-01-23 RX ORDER — LACOSAMIDE 50 MG/1
50 TABLET ORAL EVERY 12 HOURS
Refills: 0 | Status: DISCONTINUED | OUTPATIENT
Start: 2023-01-23 | End: 2023-01-24

## 2023-01-23 RX ORDER — SODIUM CHLORIDE 9 MG/ML
500 INJECTION INTRAMUSCULAR; INTRAVENOUS; SUBCUTANEOUS ONCE
Refills: 0 | Status: COMPLETED | OUTPATIENT
Start: 2023-01-23 | End: 2023-01-23

## 2023-01-23 RX ORDER — LEVETIRACETAM 250 MG/1
750 TABLET, FILM COATED ORAL EVERY 12 HOURS
Refills: 0 | Status: DISCONTINUED | OUTPATIENT
Start: 2023-01-23 | End: 2023-01-23

## 2023-01-23 RX ORDER — LAMOTRIGINE 25 MG/1
100 TABLET, ORALLY DISINTEGRATING ORAL
Refills: 0 | Status: DISCONTINUED | OUTPATIENT
Start: 2023-01-23 | End: 2023-01-25

## 2023-01-23 RX ADMIN — ATORVASTATIN CALCIUM 20 MILLIGRAM(S): 80 TABLET, FILM COATED ORAL at 21:24

## 2023-01-23 RX ADMIN — LACOSAMIDE 130 MILLIGRAM(S): 50 TABLET ORAL at 05:42

## 2023-01-23 RX ADMIN — LEVETIRACETAM 400 MILLIGRAM(S): 250 TABLET, FILM COATED ORAL at 18:43

## 2023-01-23 RX ADMIN — Medication 1 DROP(S): at 18:42

## 2023-01-23 RX ADMIN — Medication 10 MILLIGRAM(S): at 05:41

## 2023-01-23 RX ADMIN — GABAPENTIN 300 MILLIGRAM(S): 400 CAPSULE ORAL at 18:41

## 2023-01-23 RX ADMIN — Medication 1.25 MILLIGRAM(S): at 21:25

## 2023-01-23 RX ADMIN — SODIUM CHLORIDE 1000 MILLILITER(S): 9 INJECTION INTRAMUSCULAR; INTRAVENOUS; SUBCUTANEOUS at 17:19

## 2023-01-23 RX ADMIN — Medication 1000 MILLIGRAM(S): at 18:15

## 2023-01-23 RX ADMIN — Medication 30 MILLIGRAM(S): at 15:32

## 2023-01-23 RX ADMIN — Medication 1.25 MILLIGRAM(S): at 15:34

## 2023-01-23 RX ADMIN — ESCITALOPRAM OXALATE 15 MILLIGRAM(S): 10 TABLET, FILM COATED ORAL at 14:12

## 2023-01-23 RX ADMIN — Medication 1000 MILLIGRAM(S): at 11:40

## 2023-01-23 RX ADMIN — Medication 1.25 MILLIGRAM(S): at 08:59

## 2023-01-23 RX ADMIN — LEVETIRACETAM 400 MILLIGRAM(S): 250 TABLET, FILM COATED ORAL at 05:41

## 2023-01-23 RX ADMIN — LACOSAMIDE 110 MILLIGRAM(S): 50 TABLET ORAL at 18:42

## 2023-01-23 RX ADMIN — Medication 400 MILLIGRAM(S): at 11:12

## 2023-01-23 RX ADMIN — Medication 30 MILLIGRAM(S): at 16:00

## 2023-01-23 RX ADMIN — AMLODIPINE BESYLATE 5 MILLIGRAM(S): 2.5 TABLET ORAL at 14:22

## 2023-01-23 RX ADMIN — Medication 400 MILLIGRAM(S): at 17:26

## 2023-01-23 RX ADMIN — LAMOTRIGINE 100 MILLIGRAM(S): 25 TABLET, ORALLY DISINTEGRATING ORAL at 18:42

## 2023-01-23 RX ADMIN — Medication 1 DROP(S): at 05:42

## 2023-01-23 RX ADMIN — ENOXAPARIN SODIUM 40 MILLIGRAM(S): 100 INJECTION SUBCUTANEOUS at 14:22

## 2023-01-23 RX ADMIN — LURASIDONE HYDROCHLORIDE 40 MILLIGRAM(S): 40 TABLET ORAL at 21:25

## 2023-01-23 NOTE — SWALLOW BEDSIDE ASSESSMENT ADULT - ORAL PREPARATORY PHASE
Within functional limits Prolonged mastication (most likely 2/2 incomplete dentition and missing dentures).

## 2023-01-23 NOTE — SWALLOW BEDSIDE ASSESSMENT ADULT - ADDITIONAL RECOMMENDATIONS
Maintain good oral hygiene  Aspiration precautions  Service will f/u for diet upgrade when dentures are in place

## 2023-01-23 NOTE — PROGRESS NOTE ADULT - SUBJECTIVE AND OBJECTIVE BOX
THE PATIENT WAS SEEN AND EXAMINED BY ME WITH THE HOUSE-STAFF DURING MORNING ROUNDS.    67 year-old right-handed male w/ PMHx epilepsy, developmental delay, AOx3, paranoid schizophrenia, HLD, HTN, h/o spinal surgery, ?CKD, VAZQUEZ (CPAP at night), who presents to [facility] ED due to three seizure-like episodes prior to arrival judy 1/21/23. Patient with sister at bedside providing collateral. Patient with seizure-like activity on night prior to arrival, another earlier in the day of arrival, and another en route to the ED with EMS. Patient's epilepsy had been controlled on levetiracetam, lamotrigine, and also reportedly gabapentin. Patient was on lamotrigine 100 mg BID, then, due to concern for polypharmacy, was titrated down to 100mg QD "a couple of months ago," and discontinued on 1/10/23. Patient was continued on levetiracetam 1,000mg BID and gabapentin 300mg BID, per sister. Patient follows with Dr. Lopez for outpatient Neurology.    While in the ED, patient with witnessed GTC in the hallway lasting 30 seconds.    ROS: Otherwise negative unless stated in the HPI    SUBJECTIVE: Overnight placed on CPAP. Given IV tylenol for headache.  No new neurologic complaints.      artificial tears (preservative free) Ophthalmic Solution 1 Drop(s) Both EYES two times a day  atorvastatin 20 milliGRAM(s) Oral at bedtime  cefTRIAXone   IVPB 2000 milliGRAM(s) IV Intermittent every 24 hours  escitalopram Solution 15 milliGRAM(s) Oral daily  gabapentin 300 milliGRAM(s) Oral two times a day  hydrALAZINE Injectable 10 milliGRAM(s) IV Push every 4 hours PRN  lacosamide IVPB 150 milliGRAM(s) IV Intermittent every 12 hours  levETIRAcetam  IVPB 750 milliGRAM(s) IV Intermittent every 12 hours  lisinopril 40 milliGRAM(s) Oral daily  LORazepam   Injectable 1 milliGRAM(s) IV Push once PRN  lurasidone 40 milliGRAM(s) Oral at bedtime  sodium chloride 0.9%. 1000 milliLiter(s) IV Continuous <Continuous>  valproate sodium  IVPB 1000 milliGRAM(s) IV Intermittent once PRN    Physical Examination:  Constitutional: Obesity         Cardiovascular: No pitting edema, skin warm-to-touch    Neurological Examination:  Mental Status: Eyes closed, opens to physical stimulation, does not attend to examiner; no discernable speech, does not  name, does not repeat, rarely followed commands    Cranial Nerves: Eyes forcibly closed when attempting to test blink to threat; pupils are equal, round, about 5mm b/l, and reactive to light; extraocular movements cross the midline to oculocephalic maneuver, appears to have left preference at times; face is grossly symmetric at rest    Motor/Strength Testing: Seems to be full strength throughout as patient resists examiner    Sensory: Intact throughout to noxious stimuli    Coordination: Unable to assess    Gait: Gait not tested    LABS:                        13.7   9.88  )-----------( 165      ( 22 Jan 2023 07:26 )             42.1    01-22    141  |  108  |  15  ----------------------------<  87  3.6   |  19<L>  |  1.66<H>    Ca    8.8      22 Jan 2023 07:27  Phos  2.3     01-21  Mg     2.3     01-21    TPro  6.2  /  Alb  3.7  /  TBili  0.6  /  DBili  x   /  AST  42<H>  /  ALT  24  /  AlkPhos  85  01-21    IMAGING:     CT Head No Cont/CT Cervical Spine Non-Con 1/21/23:  No acute intracranial hemorrhage, territorial infarct, mass effect or calvarial fracture.  No cervical spine fracture or traumatic spondylolisthesis.  Right thyroid lobe nodule. A nonemergent thyroid ultrasound is recommended for further evaluation.      Chest X-Ray 1/21/23:  Diffuse hazy opacification of the left hemithorax, may be due to layering   pleural effusion and/or atelectasis. The right lung is clear.     THE PATIENT WAS SEEN AND EXAMINED BY ME WITH THE HOUSE-STAFF DURING MORNING ROUNDS.    67 year-old right-handed male w/ PMHx epilepsy, developmental delay, AOx3, paranoid schizophrenia, HLD, HTN, h/o spinal surgery, ?CKD, VAZQUEZ (CPAP at night), who presents to [facility] ED due to three seizure-like episodes prior to arrival judy 1/21/23. Patient with sister at bedside providing collateral. Patient with seizure-like activity on night prior to arrival, another earlier in the day of arrival, and another en route to the ED with EMS. Patient's epilepsy had been controlled on levetiracetam, lamotrigine, and also reportedly gabapentin. Patient was on lamotrigine 100 mg BID, then, due to concern for polypharmacy, was titrated down to 100mg QD "a couple of months ago," and discontinued on 1/10/23. Patient was continued on levetiracetam 1,000mg BID and gabapentin 300mg BID, per sister. Patient follows with Dr. Lopez for outpatient Neurology.    While in the ED, patient with witnessed GTC in the hallway lasting 30 seconds.    ROS: Otherwise negative unless stated in the HPI    SUBJECTIVE: Overnight placed on CPAP. Given IV tylenol for headache.  No new neurologic complaints.      artificial tears (preservative free) Ophthalmic Solution 1 Drop(s) Both EYES two times a day  atorvastatin 20 milliGRAM(s) Oral at bedtime  cefTRIAXone   IVPB 2000 milliGRAM(s) IV Intermittent every 24 hours  escitalopram Solution 15 milliGRAM(s) Oral daily  gabapentin 300 milliGRAM(s) Oral two times a day  hydrALAZINE Injectable 10 milliGRAM(s) IV Push every 4 hours PRN  lacosamide IVPB 150 milliGRAM(s) IV Intermittent every 12 hours  levETIRAcetam  IVPB 750 milliGRAM(s) IV Intermittent every 12 hours  lisinopril 40 milliGRAM(s) Oral daily  LORazepam   Injectable 1 milliGRAM(s) IV Push once PRN  lurasidone 40 milliGRAM(s) Oral at bedtime  sodium chloride 0.9%. 1000 milliLiter(s) IV Continuous <Continuous>  valproate sodium  IVPB 1000 milliGRAM(s) IV Intermittent once PRN    Physical Examination:  Constitutional: Obesity, pleasant, in no apparent distress         Cardiovascular: No pitting edema, skin warm-to-touch    Neurological Examination:  Mental Status: Alert and awake, able to follow simple commands    Cranial Nerves: Pupils are equal, round, about 5mm b/l, and reactive to light; face is grossly symmetric at rest    Motor/Strength Testing: Full strength throughout    Sensory: Intact throughout    Gait: Gait not tested    LABS:                        13.7   9.88  )-----------( 165      ( 22 Jan 2023 07:26 )             42.1    01-22    141  |  108  |  15  ----------------------------<  87  3.6   |  19<L>  |  1.66<H>    Ca    8.8      22 Jan 2023 07:27  Phos  2.3     01-21  Mg     2.3     01-21    TPro  6.2  /  Alb  3.7  /  TBili  0.6  /  DBili  x   /  AST  42<H>  /  ALT  24  /  AlkPhos  85  01-21    IMAGING:     CHEST CT Non- Con 1/22/23:  Linear and patchy peripheral opacities at the lung apices, nonspecific yet may represent atelectasis and/or scarring, followup CT is recommended in 6 months for surveillance.    CT Head No Cont/CT Cervical Spine Non-Con 1/21/23:  No acute intracranial hemorrhage, territorial infarct, mass effect or calvarial fracture.  No cervical spine fracture or traumatic spondylolisthesis.  Right thyroid lobe nodule. A nonemergent thyroid ultrasound is recommended for further evaluation.      Chest X-Ray 1/21/23:  Diffuse hazy opacification of the left hemithorax, may be due to layering   pleural effusion and/or atelectasis. The right lung is clear.     THE PATIENT WAS SEEN AND EXAMINED BY ME WITH THE HOUSE-STAFF DURING MORNING ROUNDS.    67 year-old right-handed male w/ PMHx epilepsy, developmental delay, AOx3, paranoid schizophrenia, HLD, HTN, h/o spinal surgery, ?CKD, VAZQUEZ (CPAP at night), who presents to [facility] ED due to three seizure-like episodes prior to arrival judy 1/21/23. Patient with sister at bedside providing collateral. Patient with seizure-like activity on night prior to arrival, another earlier in the day of arrival, and another en route to the ED with EMS. Patient's epilepsy had been controlled on levetiracetam, lamotrigine, and also reportedly gabapentin. Patient was on lamotrigine 100 mg BID, then, due to concern for polypharmacy, was titrated down to 100mg QD "a couple of months ago," and discontinued on 1/10/23. Patient was continued on levetiracetam 1,000mg BID and gabapentin 300mg BID, per sister. Patient follows with Dr. Lopez for outpatient Neurology.    While in the ED, patient with witnessed GTC in the hallway lasting 30 seconds.    ROS: Otherwise negative unless stated in the HPI    SUBJECTIVE: Overnight placed on CPAP. Given IV tylenol for headache.  No new neurologic complaints.      artificial tears (preservative free) Ophthalmic Solution 1 Drop(s) Both EYES two times a day  atorvastatin 20 milliGRAM(s) Oral at bedtime  cefTRIAXone   IVPB 2000 milliGRAM(s) IV Intermittent every 24 hours  escitalopram Solution 15 milliGRAM(s) Oral daily  gabapentin 300 milliGRAM(s) Oral two times a day  hydrALAZINE Injectable 10 milliGRAM(s) IV Push every 4 hours PRN  lacosamide IVPB 150 milliGRAM(s) IV Intermittent every 12 hours  levETIRAcetam  IVPB 750 milliGRAM(s) IV Intermittent every 12 hours  lisinopril 40 milliGRAM(s) Oral daily  LORazepam   Injectable 1 milliGRAM(s) IV Push once PRN  lurasidone 40 milliGRAM(s) Oral at bedtime  sodium chloride 0.9%. 1000 milliLiter(s) IV Continuous <Continuous>  valproate sodium  IVPB 1000 milliGRAM(s) IV Intermittent once PRN    Physical Examination:  Constitutional: Obesity, pleasant, in no apparent distress         Cardiovascular: Denies chest pain, SOB. No pitting edema, skin warm-to-touch    Respiratory: Denies cough currently, endorses one isolated episode of coughing since admission    Neurological Examination:  Mental Status: Alert and awake, able to follow simple commands    Cranial Nerves: Pupils are equal, round, about 5mm b/l, and reactive to light; face is grossly symmetric at rest    Motor/Strength Testing: Full strength throughout    Sensory: Intact throughout    Gait: Gait not tested    LABS:                        13.7   9.88  )-----------( 165      ( 22 Jan 2023 07:26 )             42.1    01-22    141  |  108  |  15  ----------------------------<  87  3.6   |  19<L>  |  1.66<H>    Ca    8.8      22 Jan 2023 07:27  Phos  2.3     01-21  Mg     2.3     01-21    TPro  6.2  /  Alb  3.7  /  TBili  0.6  /  DBili  x   /  AST  42<H>  /  ALT  24  /  AlkPhos  85  01-21    IMAGING:     CHEST CT Non- Con 1/22/23:  Linear and patchy peripheral opacities at the lung apices, nonspecific yet may represent atelectasis and/or scarring, followup CT is recommended in 6 months for surveillance.    CT Head No Cont/CT Cervical Spine Non-Con 1/21/23:  No acute intracranial hemorrhage, territorial infarct, mass effect or calvarial fracture.  No cervical spine fracture or traumatic spondylolisthesis.  Right thyroid lobe nodule. A nonemergent thyroid ultrasound is recommended for further evaluation.      Chest X-Ray 1/21/23:  Diffuse hazy opacification of the left hemithorax, may be due to layering   pleural effusion and/or atelectasis. The right lung is clear.     THE PATIENT WAS SEEN AND EXAMINED BY ME WITH THE HOUSE-STAFF DURING MORNING ROUNDS.    67 year-old right-handed male w/ PMHx epilepsy, developmental delay, AOx3, paranoid schizophrenia, HLD, HTN, h/o spinal surgery, ?CKD, VAZQUEZ (CPAP at night), who presents to [facility] ED due to three seizure-like episodes prior to arrival judy 1/21/23. Patient with sister at bedside providing collateral. Patient with seizure-like activity on night prior to arrival, another earlier in the day of arrival, and another en route to the ED with EMS. Patient's epilepsy had been controlled on levetiracetam, lamotrigine, and also reportedly gabapentin. Patient was on lamotrigine 100 mg BID, then, due to concern for polypharmacy, was titrated down to 100mg QD "a couple of months ago," and discontinued on 1/10/23. Patient was continued on levetiracetam 1,000mg BID and gabapentin 300mg BID, per sister. Patient follows with Dr. Lopez for outpatient Neurology.    While in the ED, patient with witnessed GTC in the hallway lasting 30 seconds.    ROS: Otherwise negative unless stated in the HPI    SUBJECTIVE: Overnight placed on CPAP. Given IV tylenol for headache.  No new neurologic complaints.      artificial tears (preservative free) Ophthalmic Solution 1 Drop(s) Both EYES two times a day  atorvastatin 20 milliGRAM(s) Oral at bedtime  cefTRIAXone   IVPB 2000 milliGRAM(s) IV Intermittent every 24 hours  escitalopram Solution 15 milliGRAM(s) Oral daily  gabapentin 300 milliGRAM(s) Oral two times a day  hydrALAZINE Injectable 10 milliGRAM(s) IV Push every 4 hours PRN  lacosamide IVPB 150 milliGRAM(s) IV Intermittent every 12 hours  levETIRAcetam  IVPB 750 milliGRAM(s) IV Intermittent every 12 hours  lisinopril 40 milliGRAM(s) Oral daily  LORazepam   Injectable 1 milliGRAM(s) IV Push once PRN  lurasidone 40 milliGRAM(s) Oral at bedtime  sodium chloride 0.9%. 1000 milliLiter(s) IV Continuous <Continuous>  valproate sodium  IVPB 1000 milliGRAM(s) IV Intermittent once PRN    Physical Examination:  Constitutional: Obesity, pleasant, in no apparent distress         Cardiovascular: Denies chest pain, SOB. No pitting edema, skin warm-to-touch    Respiratory: Denies cough currently, endorses one isolated episode of coughing since admission    Neurological Examination:  Mental Status: Alert and awake, able to follow simple commands    Cranial Nerves: Pupils are equal, round, about 5mm b/l, and reactive to light; face is grossly symmetric at rest    Motor/Strength Testing: Full strength throughout    Sensory: Intact throughout    Gait: Gait not tested    LABS:                        13.7   9.88  )-----------( 165      ( 22 Jan 2023 07:26 )             42.1    01-22    141  |  108  |  15  ----------------------------<  87  3.6   |  19<L>  |  1.66<H>    Ca    8.8      22 Jan 2023 07:27  Phos  2.3     01-21  Mg     2.3     01-21    TPro  6.2  /  Alb  3.7  /  TBili  0.6  /  DBili  x   /  AST  42<H>  /  ALT  24  /  AlkPhos  85  01-21    IMAGING/ EEG:     EEG 1/23/23  EEG Summary:  Abnormal EEG in the awake, drowsy and asleep states.  - Intermittent RT slowing in theta range  - No seizures  Impression/Clinical Correlate:  This is an abnormal EEG record.   - No epileptiform pattern or seizures were seen.  - Functional abnormality over right temporal region.  - Mild moderate slowing, nonspecific as etiology    CHEST CT Non- Con 1/22/23:  Linear and patchy peripheral opacities at the lung apices, nonspecific yet may represent atelectasis and/or scarring, followup CT is recommended in 6 months for surveillance.    CT Head No Cont/CT Cervical Spine Non-Con 1/21/23:  No acute intracranial hemorrhage, territorial infarct, mass effect or calvarial fracture.  No cervical spine fracture or traumatic spondylolisthesis.  Right thyroid lobe nodule. A nonemergent thyroid ultrasound is recommended for further evaluation.    Chest X-Ray 1/21/23:  Diffuse hazy opacification of the left hemithorax, may be due to layering   pleural effusion and/or atelectasis. The right lung is clear.

## 2023-01-23 NOTE — PROGRESS NOTE ADULT - ASSESSMENT
68 yo M PMHx epilepsy, developmental delay, paranoid schizophrenia, HLD, HTN, h/o spinal surgery, CKD, VAZQUEZ (CPAP at night), who presents to [facility] ED due to three seizure-like episodes prior to arrival. medicine consult for co management.    # Breakthrough seizures / status epilepticus  # HTN, HLD  # CKD  # VAZQUEZ    afebrile past 24 hrs, empiric ceftriaxone, urine and blood cx NTD  EEG ongoing  CT chest Linear and patchy peripheral opacities at the lung apices, nonspecific yet may represent atelectasis and/or scarring  AED per neuro, lacosamide 150mg IVPB Q12H, keppra  passed S&S, stop IV BP meds and can resume PO lisinopril, if BP remains elevated, can start norvasc 5mg qd  cont statin  rest per neuro    dvt ppx per primary team    Please call [employer] with questions [phone].

## 2023-01-23 NOTE — PROGRESS NOTE ADULT - ASSESSMENT
67 year-old right-handed male w/ PMHx epilepsy, developmental delay, AOx3, paranoid schizophrenia, HLD, HTN, h/o spinal surgery, ?CKD, VAZQUEZ (CPAP at night), who presents to [facility] ED due to three seizure-like episodes prior to arrival on 1/21/23. Patient with sister at bedside providing collateral. Patient with seizure-like activity on night prior to arrival, another earlier in the day of arrival, and another en route to the ED with EMS. Patient's epilepsy had been controlled on levetiracetam, lamotrigine, and also reportedly gabapentin. Patient was on lamotrigine 100 mg BID, then, due to concern for polypharmacy, was titrated down to 100mg QD "a couple of months ago," and discontinued on 1/10/23. Patient was continued on levetiracetam 1,000mg BID and gabapentin 300mg BID, per sister. Patient follows with Dr. Lopez for outpatient Neurology. Per family, patient had not had a seizure in years.    Home Anti-Seizure medications: LEV 1000mg BID, LTG 100mg BID, GBP 300mg BID (unclear if this is for sz VS neuropathy)    Impression: Breakthrough seizures / status epilepticus in setting of recent discontinuation of lamotrigine. Suspect seizures provoked by AED discontinuation +/- infection.    PLAN and MANAGEMENT:  - s/p lacosamide 200mg IVP load; maintenance lacosamide 150mg IVPB Q12H (DC interval 156ms)  - s/p levetiracetam 1,000mg x 1 and 2,000mg x 1 in the ED; "Decreased" levetiracetam from 1,000mg BID to 750mg BID (adjusted to account for SHAGUFTA on CKD)  - Empiric antibiotic coverage continued- Ceftriaxone  - continue maintenance IVF at 75 ml/hr, particularly given CK > 800 and SHAGUFTA on CKD  - continuous vEEG  - CPAP started last night by RT- FIO2 at 80%, Expiratory pressure 13  - Febrile at 101.7 in ER on 1/21/23, CXR with L pleural effusion VS atelectasis, pending CT chest (1/22/23), Infectious Disease consult ??  - Consider Pulmonology consult after CT chest - if pleural effusion, may need further evaluation  - Would obtain lumbar puncture in the ED, if possible??    [] First line: lorazepam 1mg IV PRN for seizure activity lasting >3-5mins, >2x/hr, >3x/day, or if GTC , repeat after 1 minute (max dose 0.1 mg/kg)    [] Second line: valproic acid 1,000mg IV PRN for seizure activity lasting >3-5mins, >2x/hr, >3x/day, or if GTC , repeat after 1 minute (max dose 0.1 mg/kg)    [] Seizure, fall and aspiration precautions. Avoid sleep deprivation.   [] Given concern for seizure, advise the patient with regards to risks and driving privileges associated with the [place] Guidelines. Advise patient regarding the risk of seizures and general seizure safety recommendations including not to be bathing alone, climbing to high places and operating heavy machinery, until cleared by follow-up outpatient Neurology. Reinforce the importance of compliance with medications. Discuss sleep hygiene and the risks of sleep disruption. Discuss the risk of death associated with seizures / SUDEP.    CORE MEASURES:        AED levels: [x] Sent [x] Pending [] Resulted     LFTs: ALT mildly elevated at 42      Plan and education provided to: Patient and family at bedside    Seizure Semiology  [] Tonic clonic  [] Clonic  [] Tonic  [] Unresponsive  [] Focal with impaired awareness  [] Focal without impaired awareness    Obtain screening lower extremity venous ultrasound in patients who meet 1 or more of the following criteria as patient is high risk for DVT/PE on admission:   [] History of DVT/PE  []Hypercoagulable states (Factor V Leiden, Cancer, OCP, etc. )  []Prolonged immobility (hemiplegia/hemiparesis/post operative or any other extended immobilization)  [] Transferred from outside facility (Rehab or Long term care) 67 year-old right-handed male w/ PMHx epilepsy, developmental delay, AOx3, paranoid schizophrenia, HLD, HTN, h/o spinal surgery, ?CKD, VAZQUEZ (CPAP at night), who presents to [facility] ED due to three seizure-like episodes prior to arrival on 1/21/23. Patient with sister at bedside providing collateral. Patient with seizure-like activity on night prior to arrival, another earlier in the day of arrival, and another en route to the ED with EMS. Patient's epilepsy had been controlled on levetiracetam, lamotrigine, and also reportedly gabapentin. Patient was on lamotrigine 100 mg BID, then, due to concern for polypharmacy, was titrated down to 100mg QD "a couple of months ago," and discontinued on 1/10/23. Patient was continued on levetiracetam 1,000mg BID and gabapentin 300mg BID, per sister. Patient follows with Dr. Lopez for outpatient Neurology. Per family, patient had not had a seizure in years.    Home Anti-Seizure medications: LEV 1000mg BID, LTG 100mg BID, GBP 300mg BID (unclear if this is for sz VS neuropathy)    Impression: Breakthrough seizures / status epilepticus in setting of recent discontinuation of lamotrigine. Suspect seizures provoked by AED discontinuation +/- infection.     PLAN and MANAGEMENT:  - s/p lacosamide 200mg IVP load; c/w maintenance lacosamide 150mg IVPB Q12H (RI interval 156ms)  - s/p levetiracetam 1,000mg x 1 and 2,000mg x 1 in the ED; "Decreased" levetiracetam from 1,000mg BID to 750mg BID (adjusted to account for SHAGUFTA on CKD); c/w 750 mg  levetiracetam BID   - Empiric antibiotic coverage continued- Ceftriaxone - consider discontinuing  - continue maintenance IVF at 75 ml/hr, particularly given CK > 800 and SHAGUFTA on CKD  - continuous vEEG   - c/w CPAP started last night by RT- FIO2 at 80%, Expiratory pressure 13  - Febrile at 101.7 in ER on 1/21/23, CXR with L pleural effusion VS atelectasis, CT chest showed linear and patchy peripheral opacities at the lung apices, may represent atelectasis and/or scarring, followup CT is recommended in 6 months (June 22nd 2023) for surveillance.  No Infectious Disease or Pulmonary consult at this time, Medicine following  - consider d/c in 24 hours    [] First line: lorazepam 1mg IV PRN for seizure activity lasting >3-5mins, >2x/hr, >3x/day, or if GTC , repeat after 1 minute (max dose 0.1 mg/kg)    [] Second line: valproic acid 1,000mg IV PRN for seizure activity lasting >3-5mins, >2x/hr, >3x/day, or if GTC , repeat after 1 minute (max dose 0.1 mg/kg)    [] Seizure, fall and aspiration precautions. Avoid sleep deprivation.   [] Given concern for seizure, advise the patient with regards to risks and driving privileges associated with the [place] Guidelines. Advise patient regarding the risk of seizures and general seizure safety recommendations including not to be bathing alone, climbing to high places and operating heavy machinery, until cleared by follow-up outpatient Neurology. Reinforce the importance of compliance with medications. Discuss sleep hygiene and the risks of sleep disruption. Discuss the risk of death associated with seizures / SUDEP.    CORE MEASURES:        AED levels: [x] Sent [x] Pending [] Resulted     LFTs: ALT mildly elevated at 42      Plan and education provided to: Patient and family at bedside    Seizure Semiology  [] Tonic clonic  [] Clonic  [] Tonic  [] Unresponsive  [] Focal with impaired awareness  [] Focal without impaired awareness    Obtain screening lower extremity venous ultrasound in patients who meet 1 or more of the following criteria as patient is high risk for DVT/PE on admission:   [] History of DVT/PE  []Hypercoagulable states (Factor V Leiden, Cancer, OCP, etc. )  []Prolonged immobility (hemiplegia/hemiparesis/post operative or any other extended immobilization)  [] Transferred from outside facility (Rehab or Long term care) 67 year-old right-handed male w/ PMHx epilepsy, developmental delay, AOx3, paranoid schizophrenia, HLD, HTN, h/o spinal surgery, ?CKD, VAZQUEZ (CPAP at night), who presents to [facility] ED due to three seizure-like episodes prior to arrival on 1/21/23. Patient with sister at bedside providing collateral. Patient with seizure-like activity on night prior to arrival, another earlier in the day of arrival, and another en route to the ED with EMS. Patient's epilepsy had been controlled on levetiracetam, lamotrigine, and also reportedly gabapentin. Patient was on lamotrigine 100 mg BID, then, due to concern for polypharmacy, was titrated down to 100mg QD "a couple of months ago," and discontinued on 1/10/23. Patient was continued on levetiracetam 1,000mg BID and gabapentin 300mg BID, per sister. Patient follows with Dr. Lopez for outpatient Neurology. Per family, patient had not had a seizure in years.    Home Anti-Seizure medications: LEV 1000mg BID, LTG 100mg BID, GBP 300mg BID (unclear if this is for sz VS neuropathy)    Impression: Breakthrough seizures / status epilepticus in setting of recent discontinuation of lamotrigine. Suspect seizures provoked by AED discontinuation +/- infection.     PLAN and MANAGEMENT:  - s/p lacosamide 200mg IVP load; s/p maintenance lacosamide 150mg IVPB Q12H (MN interval 156ms), decreased to 50 mg BID  - s/p levetiracetam 1,000mg x 1 and 2,000mg x 1 in the ED; "Decreased" levetiracetam from 1,000mg BID to 750mg BID (adjusted to account for SHAGUFTA on CKD); then increased to 1000 mg  levetiracetam BID   - start lamotrigine 100 mg BID  - Empiric antibiotic coverage continued- Ceftriaxone   - continue maintenance IVF at 75 ml/hr, particularly given CK > 800 and SHAGUFTA on CKD  - continuous vEEG   - c/w CPAP started last night by RT- FIO2 at 80%, Expiratory pressure 13  - Febrile at 101.7 in ER on 1/21/23, CXR with L pleural effusion VS atelectasis, CT chest showed linear and patchy peripheral opacities at the lung apices, may represent atelectasis and/or scarring, followup CT is recommended in 6 months (June 22nd 2023) for surveillance.  No Infectious Disease or Pulmonary consult at this time, Medicine following  - consider discharge in 24 hours    [] First line: lorazepam 1mg IV PRN for seizure activity lasting >3-5mins, >2x/hr, >3x/day, or if GTC , repeat after 1 minute (max dose 0.1 mg/kg)    [] Second line: valproic acid 1,000mg IV PRN for seizure activity lasting >3-5mins, >2x/hr, >3x/day, or if GTC , repeat after 1 minute (max dose 0.1 mg/kg)    [] Seizure, fall and aspiration precautions. Avoid sleep deprivation.   [] Given concern for seizure, advise the patient with regards to risks and driving privileges associated with the [place] Guidelines. Advise patient regarding the risk of seizures and general seizure safety recommendations including not to be bathing alone, climbing to high places and operating heavy machinery, until cleared by follow-up outpatient Neurology. Reinforce the importance of compliance with medications. Discuss sleep hygiene and the risks of sleep disruption. Discuss the risk of death associated with seizures / SUDEP.    CORE MEASURES:        AED levels: [x] Sent [x] Pending [] Resulted     LFTs: ALT mildly elevated at 42      Plan and education provided to: Patient and family at bedside    Seizure Semiology  [] Tonic clonic  [] Clonic  [] Tonic  [] Unresponsive  [] Focal with impaired awareness  [] Focal without impaired awareness    Obtain screening lower extremity venous ultrasound in patients who meet 1 or more of the following criteria as patient is high risk for DVT/PE on admission:   [] History of DVT/PE  []Hypercoagulable states (Factor V Leiden, Cancer, OCP, etc. )  []Prolonged immobility (hemiplegia/hemiparesis/post operative or any other extended immobilization)  [] Transferred from outside facility (Rehab or Long term care) 67 year-old right-handed male w/ PMHx epilepsy, developmental delay, AOx3, paranoid schizophrenia, HLD, HTN, h/o spinal surgery, ?CKD, VAZQUEZ (CPAP at night), who presents to [facility] ED due to three seizure-like episodes prior to arrival on 1/21/23. Patient with sister at bedside providing collateral. Patient with seizure-like activity on night prior to arrival, another earlier in the day of arrival, and another en route to the ED with EMS. Patient's epilepsy had been controlled on levetiracetam, lamotrigine, and also reportedly gabapentin. Patient was on lamotrigine 100 mg BID, then, due to concern for polypharmacy, was titrated down to 100mg QD "a couple of months ago," and discontinued on 1/10/23. Patient was continued on levetiracetam 1,000mg BID and gabapentin 300mg BID, per sister. Patient follows with Dr. Lopez for outpatient Neurology. Per family, patient had not had a seizure in years.    Home Anti-Seizure medications: LEV 1000mg BID, LTG 100mg BID, GBP 300mg BID (unclear if this is for sz VS neuropathy)    Impression: Breakthrough seizures / status epilepticus in setting of recent discontinuation of lamotrigine. Suspect seizures provoked by AED discontinuation +/- infection.     PLAN and MANAGEMENT:  - s/p lacosamide 200mg IVP load; s/p maintenance lacosamide 150mg IVPB Q12H (VA interval 156ms), decreased to 50 mg BID  - s/p levetiracetam 1,000mg x 1 and 2,000mg x 1 in the ED; "Decreased" levetiracetam from 1,000mg BID to 750mg BID (adjusted to account for SHAGUFTA on CKD); then increased to 1000 mg  levetiracetam BID   - start lamotrigine 100 mg BID  - Empiric antibiotic coverage continued- Ceftriaxone   - continue maintenance IVF at 75 ml/hr, particularly given CK > 800 and SHAGUFTA on CKD  - continuous vEEG   - c/w CPAP started last night by RT- FIO2 at 80%, Expiratory pressure 13  - Febrile at 101.7 in ER on 1/21/23, CXR with L pleural effusion VS atelectasis, CT chest showed linear and patchy peripheral opacities at the lung apices, may represent atelectasis and/or scarring, followup CT is recommended in 6 months (June 22nd 2023) for surveillance.  No Infectious Disease or Pulmonary consult at this time, Medicine following  - soft and bite size with thin liquids (Dentures at home with sisterLeidy Norman)  - consider discharge in 24 hours    [] First line: lorazepam 1mg IV PRN for seizure activity lasting >3-5mins, >2x/hr, >3x/day, or if GTC , repeat after 1 minute (max dose 0.1 mg/kg)    [] Second line: valproic acid 1,000mg IV PRN for seizure activity lasting >3-5mins, >2x/hr, >3x/day, or if GTC , repeat after 1 minute (max dose 0.1 mg/kg)    [] Seizure, fall and aspiration precautions. Avoid sleep deprivation.   [] Given concern for seizure, advise the patient with regards to risks and driving privileges associated with the [place] Guidelines. Advise patient regarding the risk of seizures and general seizure safety recommendations including not to be bathing alone, climbing to high places and operating heavy machinery, until cleared by follow-up outpatient Neurology. Reinforce the importance of compliance with medications. Discuss sleep hygiene and the risks of sleep disruption. Discuss the risk of death associated with seizures / SUDEP.    CORE MEASURES:        AED levels: [x] Sent [x] Pending [] Resulted     LFTs: ALT mildly elevated at 42      Plan and education provided to: Patient and family at bedside    Seizure Semiology  [] Tonic clonic  [] Clonic  [] Tonic  [] Unresponsive  [] Focal with impaired awareness  [] Focal without impaired awareness    Obtain screening lower extremity venous ultrasound in patients who meet 1 or more of the following criteria as patient is high risk for DVT/PE on admission:   [] History of DVT/PE  []Hypercoagulable states (Factor V Leiden, Cancer, OCP, etc. )  []Prolonged immobility (hemiplegia/hemiparesis/post operative or any other extended immobilization)  [] Transferred from outside facility (Rehab or Long term care) 67 year-old right-handed male w/ PMHx epilepsy, developmental delay, AOx3, paranoid schizophrenia, HLD, HTN, h/o spinal surgery, ?CKD, VAZQUEZ (CPAP at night), who presents to [facility] ED due to three seizure-like episodes prior to arrival on 1/21/23. Patient with sister at bedside providing collateral. Patient with seizure-like activity on night prior to arrival, another earlier in the day of arrival, and another en route to the ED with EMS. Patient's epilepsy had been controlled on levetiracetam, lamotrigine, and also reportedly gabapentin. Patient was on lamotrigine 100 mg BID, then, due to concern for polypharmacy, was titrated down to 100mg QD "a couple of months ago," and discontinued on 1/10/23. Patient was continued on levetiracetam 1,000mg BID and gabapentin 300mg BID, per sister. Patient follows with Dr. Lopez for outpatient Neurology. Per family, patient had not had a seizure in years.    Home Anti-Seizure medications: LEV 1000mg BID, LTG 100mg BID, GBP 300mg BID (unclear if this is for sz VS neuropathy)    Impression: Breakthrough seizures / status epilepticus in setting of recent discontinuation of lamotrigine. Suspect seizures provoked by AED discontinuation +/- infection.     PLAN and MANAGEMENT:  - s/p lacosamide 200mg IVP load; s/p maintenance lacosamide 150mg IVPB Q12H (MS interval 156ms), decreased to 50 mg BID  - s/p levetiracetam 1,000mg x 1 and 2,000mg x 1 in the ED; "Decreased" levetiracetam from 1,000mg BID to 750mg BID (adjusted to account for SHAGUFTA on CKD); then increased to 1000 mg  levetiracetam BID   - start lamotrigine 100 mg BID  - Empiric antibiotic coverage continued- Ceftriaxone   - continue maintenance IVF at 75 ml/hr, particularly given CK > 800 and SHAGUFTA on CKD  - continuous vEEG   - c/w CPAP started last night by RT- FIO2 at 80%, Expiratory pressure 13  - Febrile at 101.7 in ER on 1/21/23, CXR with L pleural effusion VS atelectasis, CT chest showed linear and patchy peripheral opacities at the lung apices, may represent atelectasis and/or scarring, followup CT is recommended in 6 months (June 22nd 2023) for surveillance.  No Infectious Disease or Pulmonary consult at this time, Medicine following  - soft and bite size with thin liquids as per s/s (Dentures at home with sisterLeidy Norman)  - as per Medicine, stop IV BP meds, resume PO lisinopril, if BP remains elevated, can start Norvasc 5mg qd, cont statin  - consider discharge in 24 hours    [] First line: lorazepam 1mg IV PRN for seizure activity lasting >3-5mins, >2x/hr, >3x/day, or if GTC , repeat after 1 minute (max dose 0.1 mg/kg)    [] Second line: valproic acid 1,000mg IV PRN for seizure activity lasting >3-5mins, >2x/hr, >3x/day, or if GTC , repeat after 1 minute (max dose 0.1 mg/kg)    [] Seizure, fall and aspiration precautions. Avoid sleep deprivation.   [] Given concern for seizure, advise the patient with regards to risks and driving privileges associated with the [place] Guidelines. Advise patient regarding the risk of seizures and general seizure safety recommendations including not to be bathing alone, climbing to high places and operating heavy machinery, until cleared by follow-up outpatient Neurology. Reinforce the importance of compliance with medications. Discuss sleep hygiene and the risks of sleep disruption. Discuss the risk of death associated with seizures / SUDEP.    CORE MEASURES:        AED levels: [x] Sent [x] Pending [] Resulted     LFTs: ALT mildly elevated at 42      Plan and education provided to: Patient and family at bedside    Seizure Semiology  [] Tonic clonic  [] Clonic  [] Tonic  [] Unresponsive  [] Focal with impaired awareness  [] Focal without impaired awareness    Obtain screening lower extremity venous ultrasound in patients who meet 1 or more of the following criteria as patient is high risk for DVT/PE on admission:   [] History of DVT/PE  []Hypercoagulable states (Factor V Leiden, Cancer, OCP, etc. )  []Prolonged immobility (hemiplegia/hemiparesis/post operative or any other extended immobilization)  [] Transferred from outside facility (Rehab or Long term care)

## 2023-01-23 NOTE — SWALLOW BEDSIDE ASSESSMENT ADULT - SLP GENERAL OBSERVATIONS
Pt awake in bed, + VEEG, O2 via NC, oriented x3. Pt able to communicate needs and follow directions for exam. Pleasant and cooperative.

## 2023-01-23 NOTE — PHARMACOTHERAPY INTERVENTION NOTE - COMMENTS
Modified penicillin allergy history to state that patient tolerated ceftriaxone during this admission.    Huy Messina, PharmD, [facility]  Clinical Pharmacy Specialist, Infectious Diseases  Tele-Antimicrobial Stewardship Program (Tele-ASP)  Tele-ASP Phone: [phone]

## 2023-01-23 NOTE — EEG REPORT - NS EEG TEXT BOX
EPILEPSY MONITORING UNIT REPORT   [facility]: [address] [facility] [address], [place] [address], Ph#: [phone] [facility]: [address] [place] [address], [place] [address], Ph#: [phone] Office: [address] [address] Ph#: [phone]  [facility]: [address] [place] [address], Phone [phone] [place]  Office: [address] [place] [address], Phone [phone]  Patient Name: Tay Gr   Age: 67 year, : 1955 MRN #: - [id number] [place]: - - Referring Physician: -  ER admit        OSH transfer  Study Started: 2023 11:35:18 AM Study Ended: hh:mm xx/xx/xxxx              Study Information:  EEG Recording Technique: The patient underwent continuous Video-EEG monitoring, using Telemetry System hardware on the XLTek Digital System. EEG and video data were stored on a computer hard drive with important events saved in digital archive files. The material was reviewed by a physician (electroencephalographer / epileptologist) on a daily basis. [place]ong and seizure detection algorithms were utilized and reviewed. An EEG Technician attended to the patient, and was available throughout daytime work hours.  The epilepsy center neurologist was available in person or on call 24-hours per day.  EEG Placement and Labeling of Electrodes: The EEG was performed utilizing 20 channel referential EEG connections (coronal over temporal over parasagittal montage) using all standard 10-20 electrode placements with EKG, with additional electrodes placed in the inferior temporal region using the modified 10-10 montage electrode placements for elective admissions, or if deemed necessary. Recording was at a sampling rate of 256 samples per second per channel. Time synchronized digital video recording was done simultaneously with EEG recording. A low light infrared camera was used for low light recording.   History:  Intellectually disabled patients with epilepsy, came in for a provoked seizures Home Antiepileptic Medication and Device   Interpretation:  Day 1 – 	Start: 2023  11:35:18 AM    	End: 2023  08:00 	Duration: 20 hours  Daily EEG Visual Analysis  FINDINGS:  The background was continuous, symmetric, spontaneously variable and reactive. During wakefulness, the posterior dominant rhythm consisted of symmetric, well-modulated 7.5 Hz activity, with amplitude to 30 uV, that attenuated to eye opening.  Low amplitude frontal beta was noted in wakefulness. Anterior to posterior gradient is present. No Breach rhythms.   Background Slowing: No generalized background slowing was present.  Focal Slowing:  Intermittent RT slowing in theta range  Sleep Background: Drowsiness was characterized by fragmentation, attenuation, and slowing of the background activity.   Sleep was characterized by the presence of vertex waves, symmetric sleep spindles and K-complexes.  Other Non-Epileptiform Findings: None were present.  Activation Procedures:  Hyperventilation was not performed.   Photic stimulation was not performed.  Interictal Epileptiform Activity:  None were present.  Events: No events or seizures recorded.  Artifacts: Intermittent myogenic and movement artifacts were noted.  ECG: The heart rate on single channel ECG was predominantly between 60-70 BPM.  AEDs: Vimpat 50 bid, Lamictal 100 bid, Keppra 1000 bid, Gabapentin 300 bid  EEG Summary:   Abnormal EEG in the awake, drowsy and asleep states.  Intermittent RT slowing in theta range No seizures   Impression/Clinical Correlate:  This is an abnormal EEG record.    No epileptiform pattern or seizures were seen. Functional abnormality over right temporal region. Mild moderate slowing, nonspecific as etiology      Saskia Rice MD Attending Physician, [facility]  ------------------------------------ EEG Reading Room: [phone] On Call Service After Hours: [phone]

## 2023-01-23 NOTE — SWALLOW BEDSIDE ASSESSMENT ADULT - SLP PERTINENT HISTORY OF CURRENT PROBLEM
- Mental Status: Eyes closed, opens to light-to-moderate physical stimulation, does not attend to examiner; no discernable speech, does not name, does not repeat, rarely followed commands. Pt admitted for Breakthrough seizures / status epilepticus in setting of recent discontinuation of lamotrigine. Suspect seizures provoked by AED discontinuation +/- infection. 1/22:  Fever 101.7 in ED. CXR with left pleural effusion vs atelectasis, CT chest pending, consider pulmonology consult. Elevated CK, downtrending. Started NS @ 75/hr

## 2023-01-23 NOTE — PROGRESS NOTE ADULT - SUBJECTIVE AND OBJECTIVE BOX
Patient is a 67y old  Male who presents with a chief complaint of Seizures after discontinuation of lamotrigine outpatient (2023 07:42)      SUBJECTIVE / OVERNIGHT EVENTS:    Patient seen and examined. only complains of HA. no dizziness confusion sob cp. afebrile.       Vital Signs Last 24 Hrs  T(C): 36.7 (2023 09:52), Max: 37.3 (2023 16:13)  T(F): 98 (2023 09:52), Max: 99.1 (2023 16:13)  HR: 97 (2023 09:52) (71 - 97)  BP: 139/82 (2023 09:52) (139/82 - 193/95)  BP(mean): --  RR: 20 (2023 09:52) (18 - 20)  SpO2: 98% (2023 09:52) (96% - 99%)    Parameters below as of 2023 09:52  Patient On (Oxygen Delivery Method): nasal cannula      I&O's Summary    2023 07:01  -  2023 07:00  --------------------------------------------------------  IN: 0 mL / OUT: 500 mL / NET: -500 mL        PE:  GENERAL: NAD, AAOx3  CHEST/LUNG: CTABL, No wheeze  HEART: Regular rate and rhythm; no murmur  ABDOMEN: Soft, Nontender, Nondistended; Bowel sounds present  EXTREMITIES:  2+ Peripheral Pulses, No edema  NEURO: No focal deficits    LABS:                        13.7   9.88  )-----------( 165      ( 2023 07:26 )             42.1     -    141  |  108  |  15  ----------------------------<  87  3.6   |  19<L>  |  1.66<H>    Ca    8.8      2023 07:27  Phos  2.3     -  Mg     2.3         TPro  6.2  /  Alb  3.7  /  TBili  0.6  /  DBili  x   /  AST  42<H>  /  ALT  24  /  AlkPhos  85        CAPILLARY BLOOD GLUCOSE      POCT Blood Glucose.: 97 mg/dL (2023 17:11)  POCT Blood Glucose.: 95 mg/dL (2023 11:29)    CARDIAC MARKERS ( 2023 22:58 )  x     / x     / 808 U/L / x     / x      CARDIAC MARKERS ( 2023 18:22 )  x     / x     / 888 U/L / x     / x          Urinalysis Basic - ( 2023 22:18 )    Color: Yellow / Appearance: Clear / S.023 / pH: x  Gluc: x / Ketone: Small  / Bili: Negative / Urobili: Negative   Blood: x / Protein: >600 / Nitrite: Negative   Leuk Esterase: Negative / RBC: 1 /hpf / WBC 11 /HPF   Sq Epi: x / Non Sq Epi: 8 /hpf / Bacteria: Negative        RADIOLOGY & ADDITIONAL TESTS:    Imaging Personally Reviewed:  [x] YES  [ ] NO    Consultant(s) Notes Reviewed:  [x] YES  [ ] NO    MEDICATIONS  (STANDING):  artificial tears (preservative free) Ophthalmic Solution 1 Drop(s) Both EYES two times a day  atorvastatin 20 milliGRAM(s) Oral at bedtime  cefTRIAXone   IVPB 2000 milliGRAM(s) IV Intermittent every 24 hours  enalaprilat Injectable 1.25 milliGRAM(s) IV Push every 6 hours  escitalopram Solution 15 milliGRAM(s) Oral daily  gabapentin 300 milliGRAM(s) Oral two times a day  lacosamide IVPB 150 milliGRAM(s) IV Intermittent every 12 hours  levETIRAcetam  IVPB 750 milliGRAM(s) IV Intermittent every 12 hours  lisinopril 40 milliGRAM(s) Oral daily  lurasidone 40 milliGRAM(s) Oral at bedtime  sodium chloride 0.9%. 1000 milliLiter(s) (75 mL/Hr) IV Continuous <Continuous>    MEDICATIONS  (PRN):  hydrALAZINE Injectable 10 milliGRAM(s) IV Push every 4 hours PRN SBP>140  LORazepam   Injectable 1 milliGRAM(s) IV Push once PRN Seizure Activity  valproate sodium  IVPB 1000 milliGRAM(s) IV Intermittent once PRN Seizure Activity      Care Discussed with Consultants/Other Providers [x] YES  [ ] NO    HEALTH ISSUES - PROBLEM Dx:

## 2023-01-23 NOTE — SWALLOW BEDSIDE ASSESSMENT ADULT - SWALLOW EVAL: DIAGNOSIS
Pt is 68 y/o M admitted for seizures after discontinuation of lamotrigine. Now presenting with a grossly functional oropharyngeal swallow sequence. No subjective signs of laryngeal penetration/aspiration observed on exam.

## 2023-01-23 NOTE — SWALLOW BEDSIDE ASSESSMENT ADULT - H & P REVIEW
67 year-old right-handed male w/ PMHx epilepsy, developmental delay, AOx3, paranoid schizophrenia, HLD, HTN, h/o spinal surgery, ?CKD, VAZQUEZ (CPAP at night), who presents to [facility] ED due to three seizure-like episodes prior to arrival. Patient with sister at bedside providing collateral. Patient with seizure-like activity on night prior to arrival, another earlier in the day of arrival, and another en route to the ED with EMS. Patient's epilepsy had been controlled on levetiracetam, lamotrigine, and also reportedly gabapentin. Patient was on lamotrigine 100 mg BID, then, due to concern for polypharmacy, was titrated down to 100mg QD "a couple of months ago," and discontinued on 1/10/23. Patient was continued on levetiracetam 1,000mg BID and gabapentin 300mg BID, per sister. Patient follows with Dr. Lopez for outpatient Neurology. While in the Ed, patient with witnessed GTC in the hallway lasting 30 seconds. (Please see ED provider note "Progress Note" section for summary of events in the ED prior to admission.)/yes

## 2023-01-24 LAB
ANION GAP SERPL CALC-SCNC: 12 MMOL/L — SIGNIFICANT CHANGE UP (ref 5–17)
BUN SERPL-MCNC: 25 MG/DL — HIGH (ref 7–23)
CALCIUM SERPL-MCNC: 8.6 MG/DL — SIGNIFICANT CHANGE UP (ref 8.4–10.5)
CHLORIDE SERPL-SCNC: 110 MMOL/L — HIGH (ref 96–108)
CO2 SERPL-SCNC: 23 MMOL/L — SIGNIFICANT CHANGE UP (ref 22–31)
CREAT SERPL-MCNC: 1.61 MG/DL — HIGH (ref 0.5–1.3)
EGFR: 47 ML/MIN/1.73M2 — LOW
GLUCOSE SERPL-MCNC: 94 MG/DL — SIGNIFICANT CHANGE UP (ref 70–99)
HCT VFR BLD CALC: 42.5 % — SIGNIFICANT CHANGE UP (ref 39–50)
HGB BLD-MCNC: 13.7 G/DL — SIGNIFICANT CHANGE UP (ref 13–17)
LEVETIRACETAM SERPL-MCNC: 42.3 UG/ML — HIGH (ref 10–40)
MAGNESIUM SERPL-MCNC: 2.2 MG/DL — SIGNIFICANT CHANGE UP (ref 1.6–2.6)
MCHC RBC-ENTMCNC: 30.6 PG — SIGNIFICANT CHANGE UP (ref 27–34)
MCHC RBC-ENTMCNC: 32.2 GM/DL — SIGNIFICANT CHANGE UP (ref 32–36)
MCV RBC AUTO: 94.9 FL — SIGNIFICANT CHANGE UP (ref 80–100)
NRBC # BLD: 0 /100 WBCS — SIGNIFICANT CHANGE UP (ref 0–0)
PHOSPHATE SERPL-MCNC: 3.1 MG/DL — SIGNIFICANT CHANGE UP (ref 2.5–4.5)
PLATELET # BLD AUTO: 151 K/UL — SIGNIFICANT CHANGE UP (ref 150–400)
POTASSIUM SERPL-MCNC: 3.7 MMOL/L — SIGNIFICANT CHANGE UP (ref 3.5–5.3)
POTASSIUM SERPL-SCNC: 3.7 MMOL/L — SIGNIFICANT CHANGE UP (ref 3.5–5.3)
RBC # BLD: 4.48 M/UL — SIGNIFICANT CHANGE UP (ref 4.2–5.8)
RBC # FLD: 13 % — SIGNIFICANT CHANGE UP (ref 10.3–14.5)
SODIUM SERPL-SCNC: 145 MMOL/L — SIGNIFICANT CHANGE UP (ref 135–145)
WBC # BLD: 6.69 K/UL — SIGNIFICANT CHANGE UP (ref 3.8–10.5)
WBC # FLD AUTO: 6.69 K/UL — SIGNIFICANT CHANGE UP (ref 3.8–10.5)

## 2023-01-24 PROCEDURE — 99232 SBSQ HOSP IP/OBS MODERATE 35: CPT

## 2023-01-24 PROCEDURE — 95720 EEG PHY/QHP EA INCR W/VEEG: CPT

## 2023-01-24 RX ORDER — LEVETIRACETAM 250 MG/1
1000 TABLET, FILM COATED ORAL
Refills: 0 | Status: DISCONTINUED | OUTPATIENT
Start: 2023-01-24 | End: 2023-01-25

## 2023-01-24 RX ADMIN — LAMOTRIGINE 100 MILLIGRAM(S): 25 TABLET, ORALLY DISINTEGRATING ORAL at 17:00

## 2023-01-24 RX ADMIN — LEVETIRACETAM 400 MILLIGRAM(S): 250 TABLET, FILM COATED ORAL at 05:48

## 2023-01-24 RX ADMIN — LEVETIRACETAM 1000 MILLIGRAM(S): 250 TABLET, FILM COATED ORAL at 16:59

## 2023-01-24 RX ADMIN — LISINOPRIL 40 MILLIGRAM(S): 2.5 TABLET ORAL at 05:48

## 2023-01-24 RX ADMIN — Medication 1 DROP(S): at 05:49

## 2023-01-24 RX ADMIN — Medication 1.25 MILLIGRAM(S): at 08:41

## 2023-01-24 RX ADMIN — CEFTRIAXONE 100 MILLIGRAM(S): 500 INJECTION, POWDER, FOR SOLUTION INTRAMUSCULAR; INTRAVENOUS at 01:13

## 2023-01-24 RX ADMIN — ATORVASTATIN CALCIUM 20 MILLIGRAM(S): 80 TABLET, FILM COATED ORAL at 21:47

## 2023-01-24 RX ADMIN — ENOXAPARIN SODIUM 40 MILLIGRAM(S): 100 INJECTION SUBCUTANEOUS at 14:05

## 2023-01-24 RX ADMIN — Medication 1 DROP(S): at 17:00

## 2023-01-24 RX ADMIN — GABAPENTIN 300 MILLIGRAM(S): 400 CAPSULE ORAL at 17:00

## 2023-01-24 RX ADMIN — LURASIDONE HYDROCHLORIDE 40 MILLIGRAM(S): 40 TABLET ORAL at 21:47

## 2023-01-24 RX ADMIN — GABAPENTIN 300 MILLIGRAM(S): 400 CAPSULE ORAL at 05:48

## 2023-01-24 RX ADMIN — ESCITALOPRAM OXALATE 15 MILLIGRAM(S): 10 TABLET, FILM COATED ORAL at 12:11

## 2023-01-24 RX ADMIN — LAMOTRIGINE 100 MILLIGRAM(S): 25 TABLET, ORALLY DISINTEGRATING ORAL at 05:56

## 2023-01-24 RX ADMIN — Medication 1.25 MILLIGRAM(S): at 14:05

## 2023-01-24 RX ADMIN — LACOSAMIDE 110 MILLIGRAM(S): 50 TABLET ORAL at 05:49

## 2023-01-24 RX ADMIN — Medication 1.25 MILLIGRAM(S): at 04:56

## 2023-01-24 RX ADMIN — AMLODIPINE BESYLATE 5 MILLIGRAM(S): 2.5 TABLET ORAL at 05:48

## 2023-01-24 RX ADMIN — Medication 10 MILLIGRAM(S): at 00:48

## 2023-01-24 NOTE — EEG REPORT - NS EEG TEXT BOX
EPILEPSY MONITORING UNIT REPORT   [facility]: [address], [place] [address], Ph#: [phone] [facility]: [address], [place] [address], Ph#: [phone] Office: [address] [address] Ph#: [phone]  [facility]: [address] [place] [address], Phone [phone] [place]  Office: [address] [place] [address], Phone [phone]  Patient Name: Tay Gr   Age: 67 year, : 1955 MRN #: - [id number] [place]: - - Referring Physician: -  ER admit        OSH transfer  Study Started: 2023 08:00 AM Study Ended: 2023 08:00 AM            Study Information:  EEG Recording Technique: The patient underwent continuous Video-EEG monitoring, using Telemetry System hardware on the XLT[employer] Digital System. EEG and video data were stored on a computer hard drive with important events saved in digital archive files. The material was reviewed by a physician (electroencephalographer / epileptologist) on a daily basis. [place] and seizure detection algorithms were utilized and reviewed. An EEG Technician attended to the patient, and was available throughout daytime work hours.  The epilepsy center neurologist was available in person or on call 24-hours per day.  EEG Placement and Labeling of Electrodes: The EEG was performed utilizing 20 channel referential EEG connections (coronal over temporal over parasagittal montage) using all standard 10-20 electrode placements with EKG, with additional electrodes placed in the inferior temporal region using the modified 10-10 montage electrode placements for elective admissions, or if deemed necessary. Recording was at a sampling rate of 256 samples per second per channel. Time synchronized digital video recording was done simultaneously with EEG recording. A low light infrared camera was used for low light recording.   History:  Intellectually disabled patients with epilepsy, came in for a provoked seizures Home Antiepileptic Medication and Device   Interpretation:  Day 2 – 	Start: 2023  08:00 AM    	End: 2023  08:00 	Duration: 24 hours  Daily EEG Visual Analysis  FINDINGS:  The background was continuous, symmetric, spontaneously variable and reactive. During wakefulness, the posterior dominant rhythm consisted of symmetric, well-modulated 7.5 Hz activity, with amplitude to 30 uV, that attenuated to eye opening.  Low amplitude frontal beta was noted in wakefulness. Anterior to posterior gradient is present. No Breach rhythms.   Background Slowing: No generalized background slowing was present.  Focal Slowing:  Intermittent RT slowing in theta range  Sleep Background: Drowsiness was characterized by fragmentation, attenuation, and slowing of the background activity.   Sleep was characterized by the presence of vertex waves, symmetric sleep spindles and K-complexes.  Other Non-Epileptiform Findings: None were present.  Activation Procedures:  Hyperventilation was not performed.   Photic stimulation was not performed.  Interictal Epileptiform Activity:  None were present.  Events: No events or seizures recorded.  Artifacts: Intermittent myogenic and movement artifacts were noted.  ECG: The heart rate on single channel ECG was predominantly between 60-70 BPM.  AEDs:  MEDICATIONS  (STANDING): gabapentin 300 milliGRAM(s) Oral two times a day lacosamide IVPB 50 milliGRAM(s) IV Intermittent every 12 hours lamoTRIgine 100 milliGRAM(s) Oral two times a day levETIRAcetam  IVPB 1000 milliGRAM(s) IV Intermittent every 12 hours   EEG Summary:   Abnormal EEG in the awake, drowsy and asleep states.  Intermittent RT slowing in theta range No seizures   Impression/Clinical Correlate:  This is an abnormal EEG record.    No epileptiform pattern or seizures were seen. Functional abnormality over right temporal region. Mild moderate slowing, nonspecific as etiology      Saskia Rice MD Attending Physician, [facility]  ------------------------------------ EEG Reading Room: [phone] On Call Service After Hours: [phone]

## 2023-01-24 NOTE — PROGRESS NOTE ADULT - ASSESSMENT
67 year-old right-handed male w/ PMHx epilepsy, developmental delay, AOx3, paranoid schizophrenia, HLD, HTN, h/o spinal surgery, ?CKD, VAZQUEZ (CPAP at night), who presents to [facility] ED due to three seizure-like episodes prior to arrival on 1/21/23. Patient with sister at bedside providing collateral. Patient with seizure-like activity on night prior to arrival, another earlier in the day of arrival, and another en route to the ED with EMS. Patient's epilepsy had been controlled on levetiracetam, lamotrigine, and also reportedly gabapentin. Patient was on lamotrigine 100 mg BID, then, due to concern for polypharmacy, was titrated down to 100mg QD "a couple of months ago," and discontinued on 1/10/23. Patient was continued on levetiracetam 1,000mg BID and gabapentin 300mg BID, per sister. Patient follows with Dr. Lopez for outpatient Neurology. Per family, patient had not had a seizure in years.    Home Anti-Seizure medications: LEV 1000mg BID, LTG 100mg BID, GBP 300mg BID (unclear if this is for sz VS neuropathy)    Impression: Breakthrough seizures / status epilepticus in setting of recent discontinuation of lamotrigine. Suspect seizures provoked by AED discontinuation +/- infection.     PLAN and MANAGEMENT:  - s/p lacosamide 200mg IVP load; s/p maintenance lacosamide 150mg IVPB Q12H (NC interval 156ms), decreased to 50 mg BID  - s/p levetiracetam 1,000mg x 1 and 2,000mg x 1 in the ED; "Decreased" levetiracetam from 1,000mg BID to 750mg BID (adjusted to account for SHAGUFTA on CKD); then increased to 1000 mg  levetiracetam BID   - start lamotrigine 100 mg BID  - Empiric antibiotic coverage continued- Ceftriaxone (tolerating well, no adverse effects noted- denies SOB,  difficulty breathing, chest pain)  - continue maintenance IVF at 75 ml/hr, particularly given CK > 800 and SHAGUFTA on CKD  - continuous vEEG   - c/w CPAP started last night by RT- FIO2 at 80%, Expiratory pressure 13  - Febrile at 101.7 in ER on 1/21/23, CXR with L pleural effusion VS atelectasis, CT chest showed linear and patchy peripheral opacities at the lung apices, may represent atelectasis and/or scarring, followup CT is recommended in 6 months (June 22nd 2023) for surveillance. Medicine following  - soft and bite size with thin liquids as per s/s (Dentures at home with sisterLeidy Norman)  - as per Medicine, started on PO lisinopril 40 mg, if BP remains elevated, can start Norvasc 5mg qd, cont statin    [] First line: lorazepam 1mg IV PRN for seizure activity lasting >3-5mins, >2x/hr, >3x/day, or if GTC , repeat after 1 minute (max dose 0.1 mg/kg)    [] Second line: valproic acid 1,000mg IV PRN for seizure activity lasting >3-5mins, >2x/hr, >3x/day, or if GTC , repeat after 1 minute (max dose 0.1 mg/kg)    [] Seizure, fall and aspiration precautions. Avoid sleep deprivation.   [] Given concern for seizure, advise the patient with regards to risks and driving privileges associated with the [place] Guidelines. Advise patient regarding the risk of seizures and general seizure safety recommendations including not to be bathing alone, climbing to high places and operating heavy machinery, until cleared by follow-up outpatient Neurology. Reinforce the importance of compliance with medications. Discuss sleep hygiene and the risks of sleep disruption. Discuss the risk of death associated with seizures / SUDEP.    CORE MEASURES:        AED levels: [x] Sent [x] Pending [] Resulted     LFTs: ALT mildly elevated at 42 on 1/22/23     Plan and education provided to: Patient and family at bedside    Seizure Semiology  [] Tonic clonic  [] Clonic  [] Tonic  [] Unresponsive  [] Focal with impaired awareness  [] Focal without impaired awareness    Obtain screening lower extremity venous ultrasound in patients who meet 1 or more of the following criteria as patient is high risk for DVT/PE on admission:   [] History of DVT/PE  []Hypercoagulable states (Factor V Leiden, Cancer, OCP, etc. )  []Prolonged immobility (hemiplegia/hemiparesis/post operative or any other extended immobilization)  [] Transferred from outside facility (Rehab or Long term care) 67 year-old right-handed male w/ PMHx epilepsy, developmental delay, AOx3, paranoid schizophrenia, HLD, HTN, h/o spinal surgery, ?CKD, VAZQUEZ (CPAP at night), who presents to [facility] ED due to three seizure-like episodes prior to arrival on 1/21/23. Patient with sister at bedside providing collateral. Patient with seizure-like activity on night prior to arrival, another earlier in the day of arrival, and another en route to the ED with EMS. Patient's epilepsy had been controlled on levetiracetam, lamotrigine, and also reportedly gabapentin. Patient was on lamotrigine 100 mg BID, then, due to concern for polypharmacy, was titrated down to 100mg QD "a couple of months ago," and discontinued on 1/10/23. Patient was continued on levetiracetam 1,000mg BID and gabapentin 300mg BID, per sister. Patient follows with Dr. Lopez for outpatient Neurology. Per family, patient had not had a seizure in years.    Home Anti-Seizure medications: LEV 1000mg BID, LTG 100mg BID, GBP 300mg BID (unclear if this is for sz VS neuropathy)    Impression: Breakthrough seizures / status epilepticus in setting of recent discontinuation of lamotrigine. Suspect seizures provoked by AED discontinuation.    PLAN and MANAGEMENT:  - discontinue lacosamide 50 mg BID  - c/w levetiracetam 1000 mg BID   - c/w lamotrigine 100 mg BID  - discontinue Ceftriaxone (tolerated well, no adverse effects noted- denies SOB,  difficulty breathing, chest pain)  - continue maintenance IVF at 75 ml/hr, particularly given CK > 800 and SHAGUFTA on CKD  - continuous vEEG   - c/w CPAP started last night by RT- FIO2 at 80%, Expiratory pressure 13  - Febrile at 101.7 in ER on 1/21/23, CXR with L pleural effusion VS atelectasis, CT chest showed linear and patchy peripheral opacities at the lung apices, may represent atelectasis and/or scarring, followup CT is recommended in 6 months (June 22nd 2023) for surveillance. Medicine following  - soft and bite size with thin liquids as per s/s (Dentures at home with sisterLeidy Norman)  - as per Medicine, started on PO lisinopril 40 mg, if BP remains elevated, can start Norvasc 5mg qd, cont statin    [] First line: lorazepam 1mg IV PRN for seizure activity lasting >3-5mins, >2x/hr, >3x/day, or if GTC , repeat after 1 minute (max dose 0.1 mg/kg)    [] Second line: valproic acid 1,000mg IV PRN for seizure activity lasting >3-5mins, >2x/hr, >3x/day, or if GTC , repeat after 1 minute (max dose 0.1 mg/kg)    [] Seizure, fall and aspiration precautions. Avoid sleep deprivation.   [] Given concern for seizure, advise the patient with regards to risks and driving privileges associated with the [place] Guidelines. Advise patient regarding the risk of seizures and general seizure safety recommendations including not to be bathing alone, climbing to high places and operating heavy machinery, until cleared by follow-up outpatient Neurology. Reinforce the importance of compliance with medications. Discuss sleep hygiene and the risks of sleep disruption. Discuss the risk of death associated with seizures / SUDEP.    CORE MEASURES:        AED levels: [x] Sent [x] Pending [] Resulted     LFTs: ALT mildly elevated at 42 on 1/22/23     Plan and education provided to: Patient and family at bedside    Seizure Semiology  [] Tonic clonic  [] Clonic  [] Tonic  [] Unresponsive  [] Focal with impaired awareness  [] Focal without impaired awareness    Obtain screening lower extremity venous ultrasound in patients who meet 1 or more of the following criteria as patient is high risk for DVT/PE on admission:   [] History of DVT/PE  []Hypercoagulable states (Factor V Leiden, Cancer, OCP, etc. )  []Prolonged immobility (hemiplegia/hemiparesis/post operative or any other extended immobilization)  [] Transferred from outside facility (Rehab or Long term care) 67 year-old right-handed male w/ PMHx epilepsy, developmental delay, AOx3, paranoid schizophrenia, HLD, HTN, h/o spinal surgery, ?CKD, VAZQUEZ (CPAP at night), who presents to [facility] ED due to three seizure-like episodes prior to arrival on 1/21/23. Patient with sister at bedside providing collateral. Patient with seizure-like activity on night prior to arrival, another earlier in the day of arrival, and another en route to the ED with EMS. Patient's epilepsy had been controlled on levetiracetam, lamotrigine, and also reportedly gabapentin. Patient was on lamotrigine 100 mg BID, then, due to concern for polypharmacy, was titrated down to 100mg QD "a couple of months ago," and discontinued on 1/10/23. Patient was continued on levetiracetam 1,000mg BID and gabapentin 300mg BID, per sister. Patient follows with Dr. Lopez for outpatient Neurology. Per family, patient had not had a seizure in years.    Home Anti-Seizure medications: LEV 1000mg BID, LTG 100mg BID, GBP 300mg BID (unclear if this is for sz VS neuropathy)    Impression: Breakthrough seizures / status epilepticus in setting of recent discontinuation of lamotrigine. Suspect seizures provoked by AED discontinuation.    PLAN and MANAGEMENT:  - discontinue lacosamide 50 mg BID  - c/w levetiracetam 1000 mg BID   - c/w lamotrigine 100 mg BID  - discontinue Ceftriaxone (tolerated well, no adverse effects noted- denies SOB,  difficulty breathing, chest pain)  - continue maintenance IVF at 75 ml/hr, particularly given CK > 800 and SHAGUFTA on CKD  - continuous vEEG   - c/w CPAP started last night by RT- FIO2 at 80%, Expiratory pressure 13  - Febrile at 101.7 in ER on 1/21/23, CXR with L pleural effusion VS atelectasis, CT chest showed linear and patchy peripheral opacities at the lung apices, may represent atelectasis and/or scarring, followup CT is recommended in 6 months (June 22nd 2023) for surveillance. Medicine following  - soft and bite size with thin liquids as per s/s (Dentures at home with sisterLeidy Norman)  - as per Medicine, started on PO lisinopril 40 mg, if BP remains elevated, can start Norvasc 5mg qd, cont statin  - PT/OT evaluation requested    [] First line: lorazepam 1mg IV PRN for seizure activity lasting >3-5mins, >2x/hr, >3x/day, or if GTC , repeat after 1 minute (max dose 0.1 mg/kg)    [] Second line: valproic acid 1,000mg IV PRN for seizure activity lasting >3-5mins, >2x/hr, >3x/day, or if GTC , repeat after 1 minute (max dose 0.1 mg/kg)    [] Seizure, fall and aspiration precautions. Avoid sleep deprivation.   [] Given concern for seizure, advise the patient with regards to risks and driving privileges associated with the [place] Guidelines. Advise patient regarding the risk of seizures and general seizure safety recommendations including not to be bathing alone, climbing to high places and operating heavy machinery, until cleared by follow-up outpatient Neurology. Reinforce the importance of compliance with medications. Discuss sleep hygiene and the risks of sleep disruption. Discuss the risk of death associated with seizures / SUDEP.    CORE MEASURES:        AED levels: [x] Sent [x] Pending [] Resulted     LFTs: ALT mildly elevated at 42 on 1/22/23     Plan and education provided to: Patient and family at bedside    Seizure Semiology  [] Tonic clonic  [] Clonic  [] Tonic  [] Unresponsive  [] Focal with impaired awareness  [] Focal without impaired awareness    Obtain screening lower extremity venous ultrasound in patients who meet 1 or more of the following criteria as patient is high risk for DVT/PE on admission:   [] History of DVT/PE  []Hypercoagulable states (Factor V Leiden, Cancer, OCP, etc. )  []Prolonged immobility (hemiplegia/hemiparesis/post operative or any other extended immobilization)  [] Transferred from outside facility (Rehab or Long term care) 67 year-old right-handed male w/ PMHx epilepsy, developmental delay, AOx3, paranoid schizophrenia, HLD, HTN, h/o spinal surgery, ?CKD, VAZQUEZ (CPAP at night), who presents to [facility] ED due to three seizure-like episodes prior to arrival on 1/21/23. Patient with sister at bedside providing collateral. Patient with seizure-like activity on night prior to arrival, another earlier in the day of arrival, and another en route to the ED with EMS. Patient's epilepsy had been controlled on levetiracetam, lamotrigine, and also reportedly gabapentin. Patient was on lamotrigine 100 mg BID, then, due to concern for polypharmacy, was titrated down to 100mg QD "a couple of months ago," and discontinued on 1/10/23. Patient was continued on levetiracetam 1,000mg BID and gabapentin 300mg BID, per sister. Patient follows with Dr. Lopez for outpatient Neurology. Per family, patient had not had a seizure in years.    Home Anti-Seizure medications: LEV 1000mg BID, LTG 100mg BID, GBP 300mg BID (unclear if this is for sz VS neuropathy)    Impression: Breakthrough seizures / status epilepticus in setting of recent discontinuation of lamotrigine. Suspect seizures provoked by AED discontinuation.    PLAN and MANAGEMENT:  - discontinue lacosamide 50 mg BID  - c/w levetiracetam 1000 mg BID   - c/w lamotrigine 100 mg BID  - discontinue Ceftriaxone (tolerated well, no adverse effects noted- denies SOB,  difficulty breathing, chest pain)  - d/c maintenance IVF at 75 ml/hr, CK > 800. Repeat ordered 1/25/23  - continuous vEEG   - c/w CPAP started last night by RT- FIO2 at 80%, Expiratory pressure 13  - Febrile at 101.7 in ER on 1/21/23, CXR with L pleural effusion VS atelectasis, CT chest showed linear and patchy peripheral opacities at the lung apices, may represent atelectasis and/or scarring, followup CT is recommended in 6 months (June 22nd 2023) for surveillance. Medicine following  - soft and bite size with thin liquids as per s/s (Dentures at home with sisterLeidy Norman)  - as per Medicine, started on PO lisinopril 40 mg, if BP remains elevated, can start Norvasc 5mg qd, cont statin  - PT/OT evaluation requested    [] First line: lorazepam 1mg IV PRN for seizure activity lasting >3-5mins, >2x/hr, >3x/day, or if GTC , repeat after 1 minute (max dose 0.1 mg/kg)    [] Second line: valproic acid 1,000mg IV PRN for seizure activity lasting >3-5mins, >2x/hr, >3x/day, or if GTC , repeat after 1 minute (max dose 0.1 mg/kg)    [] Seizure, fall and aspiration precautions. Avoid sleep deprivation.   [] Given concern for seizure, advise the patient with regards to risks and driving privileges associated with the [place] Guidelines. Advise patient regarding the risk of seizures and general seizure safety recommendations including not to be bathing alone, climbing to high places and operating heavy machinery, until cleared by follow-up outpatient Neurology. Reinforce the importance of compliance with medications. Discuss sleep hygiene and the risks of sleep disruption. Discuss the risk of death associated with seizures / SUDEP.    CORE MEASURES:        AED levels: [x] Sent [x] Pending [] Resulted     LFTs: ALT mildly elevated at 42 on 1/22/23     Plan and education provided to: Patient and family at bedside    Seizure Semiology  [] Tonic clonic  [] Clonic  [] Tonic  [] Unresponsive  [] Focal with impaired awareness  [] Focal without impaired awareness    Obtain screening lower extremity venous ultrasound in patients who meet 1 or more of the following criteria as patient is high risk for DVT/PE on admission:   [] History of DVT/PE  []Hypercoagulable states (Factor V Leiden, Cancer, OCP, etc. )  []Prolonged immobility (hemiplegia/hemiparesis/post operative or any other extended immobilization)  [] Transferred from outside facility (Rehab or Long term care)

## 2023-01-24 NOTE — PROGRESS NOTE ADULT - SUBJECTIVE AND OBJECTIVE BOX
Patient is a 67y old  Male who presents with a chief complaint of Seizures after discontinuation of lamotrigine outpatient (24 Jan 2023 07:37)      SUBJECTIVE / OVERNIGHT EVENTS:    Patient seen and examined. sitting in chair on eeg. feels fine. co neck pain from fall but no cp sob abd pain dizziness confusion. afebrile.      Vital Signs Last 24 Hrs  T(C): 37.1 (24 Jan 2023 09:49), Max: 37.1 (23 Jan 2023 18:24)  T(F): 98.8 (24 Jan 2023 09:49), Max: 98.8 (24 Jan 2023 09:49)  HR: 99 (24 Jan 2023 09:49) (67 - 99)  BP: 137/75 (24 Jan 2023 09:49) (137/75 - 168/93)  BP(mean): --  RR: 18 (24 Jan 2023 09:49) (14 - 18)  SpO2: 100% (24 Jan 2023 09:49) (97% - 100%)    Parameters below as of 24 Jan 2023 09:49  Patient On (Oxygen Delivery Method): room air      I&O's Summary    23 Jan 2023 07:01  -  24 Jan 2023 07:00  --------------------------------------------------------  IN: 1630 mL / OUT: 700 mL / NET: 930 mL    24 Jan 2023 07:01  -  24 Jan 2023 14:08  --------------------------------------------------------  IN: 480 mL / OUT: 200 mL / NET: 280 mL        PE:  GENERAL: NAD, AAOx3  CHEST/LUNG: CTABL, No wheeze  HEART: Regular rate and rhythm; no murmur  ABDOMEN: Soft, Nontender, Nondistended; Bowel sounds present  EXTREMITIES:  2+ Peripheral Pulses, No edema  NEURO: No focal deficits, EEG on    LABS:                        13.7   6.69  )-----------( 151      ( 24 Jan 2023 06:29 )             42.5     01-24    145  |  110<H>  |  25<H>  ----------------------------<  94  3.7   |  23  |  1.61<H>    Ca    8.6      24 Jan 2023 06:29  Phos  3.1     01-24  Mg     2.2     01-24        CAPILLARY BLOOD GLUCOSE                RADIOLOGY & ADDITIONAL TESTS:    Imaging Personally Reviewed:  [x] YES  [ ] NO    Consultant(s) Notes Reviewed:  [x] YES  [ ] NO    MEDICATIONS  (STANDING):  amLODIPine   Tablet 5 milliGRAM(s) Oral daily  artificial tears (preservative free) Ophthalmic Solution 1 Drop(s) Both EYES two times a day  atorvastatin 20 milliGRAM(s) Oral at bedtime  enalaprilat Injectable 1.25 milliGRAM(s) IV Push every 6 hours  enoxaparin Injectable 40 milliGRAM(s) SubCutaneous every 24 hours  escitalopram Solution 15 milliGRAM(s) Oral daily  gabapentin 300 milliGRAM(s) Oral two times a day  lamoTRIgine 100 milliGRAM(s) Oral two times a day  levETIRAcetam  IVPB 1000 milliGRAM(s) IV Intermittent every 12 hours  lisinopril 40 milliGRAM(s) Oral daily  lurasidone 40 milliGRAM(s) Oral at bedtime    MEDICATIONS  (PRN):  hydrALAZINE Injectable 10 milliGRAM(s) IV Push every 4 hours PRN SBP>140  LORazepam   Injectable 1 milliGRAM(s) IV Push once PRN Seizure Activity  valproate sodium  IVPB 1000 milliGRAM(s) IV Intermittent once PRN Seizure Activity      Care Discussed with Consultants/Other Providers [x] YES  [ ] NO    HEALTH ISSUES - PROBLEM Dx:

## 2023-01-24 NOTE — PROGRESS NOTE ADULT - SUBJECTIVE AND OBJECTIVE BOX
THE PATIENT WAS SEEN AND EXAMINED BY ME WITH THE HOUSESTAFF DURING MORNING ROUNDS.     67 year-old right-handed male w/ PMHx epilepsy, developmental delay, AOx3, paranoid schizophrenia, HLD, HTN, h/o spinal surgery, ?CKD, VAZQUEZ (CPAP at night), who presents to [facility] ED due to three seizure-like episodes prior to arrival judy 1/21/23. Patient with sister at bedside providing collateral. Patient with seizure-like activity on night prior to arrival, another earlier in the day of arrival, and another en route to the ED with EMS. Patient's epilepsy had been controlled on levetiracetam, lamotrigine, and also reportedly gabapentin. Patient was on lamotrigine 100 mg BID, then, due to concern for polypharmacy, was titrated down to 100mg QD "a couple of months ago," and discontinued on 1/10/23. Patient was continued on levetiracetam 1,000mg BID and gabapentin 300mg BID, per sister. Patient follows with Dr. Lopez for outpatient Neurology.    While in the ED, patient with witnessed GTC in the hallway lasting 30 seconds.    ROS: Otherwise negative unless stated in the HPI    SUBJECTIVE: No events overnight.  No new neurologic complaints.      amLODIPine   Tablet 5 milliGRAM(s) Oral daily  artificial tears (preservative free) Ophthalmic Solution 1 Drop(s) Both EYES two times a day  atorvastatin 20 milliGRAM(s) Oral at bedtime  cefTRIAXone   IVPB 2000 milliGRAM(s) IV Intermittent every 24 hours  enalaprilat Injectable 1.25 milliGRAM(s) IV Push every 6 hours  enoxaparin Injectable 40 milliGRAM(s) SubCutaneous every 24 hours  escitalopram Solution 15 milliGRAM(s) Oral daily  gabapentin 300 milliGRAM(s) Oral two times a day  hydrALAZINE Injectable 10 milliGRAM(s) IV Push every 4 hours PRN  lacosamide IVPB 50 milliGRAM(s) IV Intermittent every 12 hours  lamoTRIgine 100 milliGRAM(s) Oral two times a day  levETIRAcetam  IVPB 1000 milliGRAM(s) IV Intermittent every 12 hours  lisinopril 40 milliGRAM(s) Oral daily  LORazepam   Injectable 1 milliGRAM(s) IV Push once PRN  lurasidone 40 milliGRAM(s) Oral at bedtime  valproate sodium  IVPB 1000 milliGRAM(s) IV Intermittent once PRN      Physical Examination:  Constitutional: Obesity, pleasant, in no apparent distress         Cardiovascular: Denies chest pain, SOB. No pitting edema, skin warm-to-touch    Respiratory: Denies cough currently, endorses one isolated episode of coughing since admission    Neurological Examination:  Mental Status: Alert and awake, able to follow simple commands    Cranial Nerves: Pupils are equal, round, about 5mm b/l, and reactive to light; face is grossly symmetric at rest    Motor/Strength Testing: Full strength throughout    Sensory: Intact throughout    Gait: Gait not tested    LABS:                        13.7   6.69  )-----------( 151      ( 24 Jan 2023 06:29 )             42.5    01-24    145  |  110<H>  |  25<H>  ----------------------------<  94  3.7   |  23  |  1.61<H>    Ca    8.6      24 Jan 2023 06:29  Phos  3.1     01-24  Mg     2.2     01-24    IMAGING / EEG Report :     EEG 1/23/23  EEG Summary:  Abnormal EEG in the awake, drowsy and asleep states.  - Intermittent RT slowing in theta range  - No seizures  Impression/Clinical Correlate:  This is an abnormal EEG record.   - No epileptiform pattern or seizures were seen.  - Functional abnormality over right temporal region.  - Mild moderate slowing, nonspecific as etiology    CHEST CT Non- Con 1/22/23:  Linear and patchy peripheral opacities at the lung apices, nonspecific yet may represent atelectasis and/or scarring, followup CT is recommended in 6 months for surveillance.    CT Head No Cont/CT Cervical Spine Non-Con 1/21/23:  No acute intracranial hemorrhage, territorial infarct, mass effect or calvarial fracture.  No cervical spine fracture or traumatic spondylolisthesis.  Right thyroid lobe nodule. A nonemergent thyroid ultrasound is recommended for further evaluation.    Chest X-Ray 1/21/23:  Diffuse hazy opacification of the left hemithorax, may be due to layering   pleural effusion and/or atelectasis. The right lung is clear.   THE PATIENT WAS SEEN AND EXAMINED BY ME WITH THE HOUSESTAFF DURING MORNING ROUNDS.     67 year-old right-handed male w/ PMHx epilepsy, developmental delay, AOx3, paranoid schizophrenia, HLD, HTN, h/o spinal surgery, ?CKD, VAZQUEZ (CPAP at night), who presents to [facility] ED due to three seizure-like episodes prior to arrival ujdy 1/21/23. Patient with sister at bedside providing collateral. Patient with seizure-like activity on night prior to arrival, another earlier in the day of arrival, and another en route to the ED with EMS. Patient's epilepsy had been controlled on levetiracetam, lamotrigine, and also reportedly gabapentin. Patient was on lamotrigine 100 mg BID, then, due to concern for polypharmacy, was titrated down to 100mg QD "a couple of months ago," and discontinued on 1/10/23. Patient was continued on levetiracetam 1,000mg BID and gabapentin 300mg BID, per sister. Patient follows with Dr. Lopez for outpatient Neurology.    While in the ED, patient with witnessed GTC in the hallway lasting 30 seconds.    ROS: Otherwise negative unless stated in the HPI    SUBJECTIVE: No events overnight.  No new neurologic complaints.      amLODIPine   Tablet 5 milliGRAM(s) Oral daily  artificial tears (preservative free) Ophthalmic Solution 1 Drop(s) Both EYES two times a day  atorvastatin 20 milliGRAM(s) Oral at bedtime  cefTRIAXone   IVPB 2000 milliGRAM(s) IV Intermittent every 24 hours  enalaprilat Injectable 1.25 milliGRAM(s) IV Push every 6 hours  enoxaparin Injectable 40 milliGRAM(s) SubCutaneous every 24 hours  escitalopram Solution 15 milliGRAM(s) Oral daily  gabapentin 300 milliGRAM(s) Oral two times a day  hydrALAZINE Injectable 10 milliGRAM(s) IV Push every 4 hours PRN  lacosamide IVPB 50 milliGRAM(s) IV Intermittent every 12 hours  lamoTRIgine 100 milliGRAM(s) Oral two times a day  levETIRAcetam  IVPB 1000 milliGRAM(s) IV Intermittent every 12 hours  lisinopril 40 milliGRAM(s) Oral daily  LORazepam   Injectable 1 milliGRAM(s) IV Push once PRN  lurasidone 40 milliGRAM(s) Oral at bedtime  valproate sodium  IVPB 1000 milliGRAM(s) IV Intermittent once PRN      Physical Examination:  Constitutional: Obesity, pleasant, in no apparent distress         Cardiovascular: Denies chest pain, SOB. No pitting edema, skin warm-to-touch    Respiratory: Denies cough currently, endorses one isolated episode of coughing since admission    Neurological Examination:  Mental Status: Alert and awake, able to follow simple commands    Cranial Nerves: Pupils are equal, round, about 5mm b/l, and reactive to light; face is grossly symmetric at rest    Motor/Strength Testing: Full strength throughout    Sensory: Intact throughout    Gait: Gait not tested    LABS:                        13.7   6.69  )-----------( 151      ( 24 Jan 2023 06:29 )             42.5    01-24    145  |  110<H>  |  25<H>  ----------------------------<  94  3.7   |  23  |  1.61<H>    Ca    8.6      24 Jan 2023 06:29  Phos  3.1     01-24  Mg     2.2     01-24    IMAGING / EEG Report :   1/2  EEG Summary:      Abnormal EEG in the awake, drowsy and asleep states.    Intermittent RT slowing in theta range  No seizures      Impression/Clinical Correlate:    This is an abnormal EEG record.       No epileptiform pattern or seizures were seen.  Functional abnormality over right temporal region.  Mild moderate slowing, nonspecific as etiology      EEG 1/23/23  EEG Summary:  Abnormal EEG in the awake, drowsy and asleep states.  - Intermittent RT slowing in theta range  - No seizures  Impression/Clinical Correlate:  This is an abnormal EEG record.   - No epileptiform pattern or seizures were seen.  - Functional abnormality over right temporal region.  - Mild moderate slowing, nonspecific as etiology    CHEST CT Non- Con 1/22/23:  Linear and patchy peripheral opacities at the lung apices, nonspecific yet may represent atelectasis and/or scarring, followup CT is recommended in 6 months for surveillance.    CT Head No Cont/CT Cervical Spine Non-Con 1/21/23:  No acute intracranial hemorrhage, territorial infarct, mass effect or calvarial fracture.  No cervical spine fracture or traumatic spondylolisthesis.  Right thyroid lobe nodule. A nonemergent thyroid ultrasound is recommended for further evaluation.    Chest X-Ray 1/21/23:  Diffuse hazy opacification of the left hemithorax, may be due to layering   pleural effusion and/or atelectasis. The right lung is clear.   THE PATIENT WAS SEEN AND EXAMINED BY ME WITH THE HOUSESTAFF DURING MORNING ROUNDS.     67 year-old right-handed male w/ PMHx epilepsy, developmental delay, AOx3, paranoid schizophrenia, HLD, HTN, h/o spinal surgery, ?CKD, VAZQUEZ (CPAP at night), who presents to [facility] ED due to three seizure-like episodes prior to arrival judy 1/21/23. Patient with sister at bedside providing collateral. Patient with seizure-like activity on night prior to arrival, another earlier in the day of arrival, and another en route to the ED with EMS. Patient's epilepsy had been controlled on levetiracetam, lamotrigine, and also reportedly gabapentin. Patient was on lamotrigine 100 mg BID, then, due to concern for polypharmacy, was titrated down to 100mg QD "a couple of months ago," and discontinued on 1/10/23. Patient was continued on levetiracetam 1,000mg BID and gabapentin 300mg BID, per sister. Patient follows with Dr. Lopez for outpatient Neurology.    While in the ED, patient with witnessed GTC in the hallway lasting 30 seconds.    ROS: Otherwise negative unless stated in the HPI    SUBJECTIVE: No events overnight.  No new neurologic complaints.      amLODIPine   Tablet 5 milliGRAM(s) Oral daily  artificial tears (preservative free) Ophthalmic Solution 1 Drop(s) Both EYES two times a day  atorvastatin 20 milliGRAM(s) Oral at bedtime  cefTRIAXone   IVPB 2000 milliGRAM(s) IV Intermittent every 24 hours  enalaprilat Injectable 1.25 milliGRAM(s) IV Push every 6 hours  enoxaparin Injectable 40 milliGRAM(s) SubCutaneous every 24 hours  escitalopram Solution 15 milliGRAM(s) Oral daily  gabapentin 300 milliGRAM(s) Oral two times a day  hydrALAZINE Injectable 10 milliGRAM(s) IV Push every 4 hours PRN  lacosamide IVPB 50 milliGRAM(s) IV Intermittent every 12 hours  lamoTRIgine 100 milliGRAM(s) Oral two times a day  levETIRAcetam  IVPB 1000 milliGRAM(s) IV Intermittent every 12 hours  lisinopril 40 milliGRAM(s) Oral daily  LORazepam   Injectable 1 milliGRAM(s) IV Push once PRN  lurasidone 40 milliGRAM(s) Oral at bedtime  valproate sodium  IVPB 1000 milliGRAM(s) IV Intermittent once PRN      Physical Examination:  Constitutional: Obesity, pleasant, in no apparent distress         Cardiovascular: Denies chest pain, SOB. No pitting edema, skin warm-to-touch    Respiratory: Denies cough currently, endorsed one isolated episode of coughing since admission but none since    Neurological Examination:  Mental Status: Alert and awake, able to state name, date and place, Able to follow simple commands    Cranial Nerves: Pupils are equal, round, and reactive to light; face is grossly symmetric at rest    Motor/Strength Testing: Full strength throughout    Sensory: Intact throughout    Gait: Gait not tested    LABS:                        13.7   6.69  )-----------( 151      ( 24 Jan 2023 06:29 )             42.5    01-24    145  |  110<H>  |  25<H>  ----------------------------<  94  3.7   |  23  |  1.61<H>    Ca    8.6      24 Jan 2023 06:29  Phos  3.1     01-24  Mg     2.2     01-24    IMAGING / EEG Report :     EEG 1/24/23  EEG Summary:  Abnormal EEG in the awake, drowsy and asleep states.  - Intermittent RT slowing in theta range  - No seizures  Impression/Clinical Correlate:  This is an abnormal EEG record.   - No epileptiform pattern or seizures were seen.  - Functional abnormality over right temporal region.  - Mild moderate slowing, nonspecific as etiology    EEG 1/23/23  EEG Summary:  Abnormal EEG in the awake, drowsy and asleep states.  - Intermittent RT slowing in theta range  - No seizures  Impression/Clinical Correlate:  This is an abnormal EEG record.   - No epileptiform pattern or seizures were seen.  - Functional abnormality over right temporal region.  - Mild moderate slowing, nonspecific as etiology    CHEST CT Non- Con 1/22/23:  Linear and patchy peripheral opacities at the lung apices, nonspecific yet may represent atelectasis and/or scarring, followup CT is recommended in 6 months for surveillance.    CT Head No Cont/CT Cervical Spine Non-Con 1/21/23:  No acute intracranial hemorrhage, territorial infarct, mass effect or calvarial fracture.  No cervical spine fracture or traumatic spondylolisthesis.  Right thyroid lobe nodule. A nonemergent thyroid ultrasound is recommended for further evaluation.    Chest X-Ray 1/21/23:  Diffuse hazy opacification of the left hemithorax, may be due to layering   pleural effusion and/or atelectasis. The right lung is clear.

## 2023-01-24 NOTE — PROGRESS NOTE ADULT - ASSESSMENT
66 yo M PMHx epilepsy, developmental delay, paranoid schizophrenia, HLD, HTN, h/o spinal surgery, CKD, VAZQUEZ (CPAP at night), who presents to [facility] ED due to three seizure-like episodes prior to arrival. medicine consult for co management.    # Breakthrough seizures / status epilepticus  # HTN, HLD  # CKD  # VAZQUEZ    infectious ramos negative  EEG ongoing  AED per neuro  passed S&S, stop IV BP meds  cont PO lisinopril and norvasc 5mg qd  cont statin  PT  rest per neuro    dvt ppx per primary team    Dr. Jared Zamudio will be covering me starting tomorrow.  Please contact with any questions or concerns [phone].

## 2023-01-25 ENCOUNTER — TRANSCRIPTION ENCOUNTER (OUTPATIENT)
Age: 68
End: 2023-01-25

## 2023-01-25 VITALS
DIASTOLIC BLOOD PRESSURE: 71 MMHG | SYSTOLIC BLOOD PRESSURE: 123 MMHG | RESPIRATION RATE: 18 BRPM | HEART RATE: 61 BPM | TEMPERATURE: 98 F | OXYGEN SATURATION: 98 %

## 2023-01-25 LAB — CK SERPL-CCNC: 105 U/L — SIGNIFICANT CHANGE UP (ref 30–200)

## 2023-01-25 PROCEDURE — 95718 EEG PHYS/QHP 2-12 HR W/VEEG: CPT

## 2023-01-25 PROCEDURE — 80177 DRUG SCRN QUAN LEVETIRACETAM: CPT

## 2023-01-25 PROCEDURE — 82947 ASSAY GLUCOSE BLOOD QUANT: CPT

## 2023-01-25 PROCEDURE — 85018 HEMOGLOBIN: CPT

## 2023-01-25 PROCEDURE — 85027 COMPLETE CBC AUTOMATED: CPT

## 2023-01-25 PROCEDURE — 85025 COMPLETE CBC W/AUTO DIFF WBC: CPT

## 2023-01-25 PROCEDURE — 84295 ASSAY OF SERUM SODIUM: CPT

## 2023-01-25 PROCEDURE — C9254: CPT

## 2023-01-25 PROCEDURE — 82435 ASSAY OF BLOOD CHLORIDE: CPT

## 2023-01-25 PROCEDURE — 82330 ASSAY OF CALCIUM: CPT

## 2023-01-25 PROCEDURE — 99285 EMERGENCY DEPT VISIT HI MDM: CPT | Mod: 25

## 2023-01-25 PROCEDURE — 96376 TX/PRO/DX INJ SAME DRUG ADON: CPT

## 2023-01-25 PROCEDURE — 82550 ASSAY OF CK (CPK): CPT

## 2023-01-25 PROCEDURE — 70450 CT HEAD/BRAIN W/O DYE: CPT | Mod: MA

## 2023-01-25 PROCEDURE — 80048 BASIC METABOLIC PNL TOTAL CA: CPT

## 2023-01-25 PROCEDURE — 93005 ELECTROCARDIOGRAM TRACING: CPT

## 2023-01-25 PROCEDURE — 80307 DRUG TEST PRSMV CHEM ANLYZR: CPT

## 2023-01-25 PROCEDURE — 87040 BLOOD CULTURE FOR BACTERIA: CPT

## 2023-01-25 PROCEDURE — 36415 COLL VENOUS BLD VENIPUNCTURE: CPT

## 2023-01-25 PROCEDURE — 96375 TX/PRO/DX INJ NEW DRUG ADDON: CPT

## 2023-01-25 PROCEDURE — 84132 ASSAY OF SERUM POTASSIUM: CPT

## 2023-01-25 PROCEDURE — 97166 OT EVAL MOD COMPLEX 45 MIN: CPT

## 2023-01-25 PROCEDURE — 84100 ASSAY OF PHOSPHORUS: CPT

## 2023-01-25 PROCEDURE — 96365 THER/PROPH/DIAG IV INF INIT: CPT

## 2023-01-25 PROCEDURE — 95700 EEG CONT REC W/VID EEG TECH: CPT

## 2023-01-25 PROCEDURE — 97162 PT EVAL MOD COMPLEX 30 MIN: CPT

## 2023-01-25 PROCEDURE — 94660 CPAP INITIATION&MGMT: CPT

## 2023-01-25 PROCEDURE — 95713 VEEG 2-12 HR CONT MNTR: CPT

## 2023-01-25 PROCEDURE — 85014 HEMATOCRIT: CPT

## 2023-01-25 PROCEDURE — 81001 URINALYSIS AUTO W/SCOPE: CPT

## 2023-01-25 PROCEDURE — 0225U NFCT DS DNA&RNA 21 SARSCOV2: CPT

## 2023-01-25 PROCEDURE — 92610 EVALUATE SWALLOWING FUNCTION: CPT

## 2023-01-25 PROCEDURE — 95716 VEEG EA 12-26HR CONT MNTR: CPT

## 2023-01-25 PROCEDURE — 83735 ASSAY OF MAGNESIUM: CPT

## 2023-01-25 PROCEDURE — 99238 HOSP IP/OBS DSCHRG MGMT 30/<: CPT

## 2023-01-25 PROCEDURE — 92526 ORAL FUNCTION THERAPY: CPT

## 2023-01-25 PROCEDURE — 71045 X-RAY EXAM CHEST 1 VIEW: CPT

## 2023-01-25 PROCEDURE — 84146 ASSAY OF PROLACTIN: CPT

## 2023-01-25 PROCEDURE — 71250 CT THORAX DX C-: CPT

## 2023-01-25 PROCEDURE — 86803 HEPATITIS C AB TEST: CPT

## 2023-01-25 PROCEDURE — 82962 GLUCOSE BLOOD TEST: CPT

## 2023-01-25 PROCEDURE — 87637 SARSCOV2&INF A&B&RSV AMP PRB: CPT

## 2023-01-25 PROCEDURE — 80053 COMPREHEN METABOLIC PANEL: CPT

## 2023-01-25 PROCEDURE — 82803 BLOOD GASES ANY COMBINATION: CPT

## 2023-01-25 PROCEDURE — 83605 ASSAY OF LACTIC ACID: CPT

## 2023-01-25 PROCEDURE — 72125 CT NECK SPINE W/O DYE: CPT | Mod: MA

## 2023-01-25 RX ORDER — LAMOTRIGINE 25 MG/1
1 TABLET, ORALLY DISINTEGRATING ORAL
Qty: 60 | Refills: 0
Start: 2023-01-25 | End: 2023-02-23

## 2023-01-25 RX ORDER — AMLODIPINE BESYLATE 2.5 MG/1
1 TABLET ORAL
Qty: 0 | Refills: 0 | DISCHARGE
Start: 2023-01-25

## 2023-01-25 RX ADMIN — ESCITALOPRAM OXALATE 15 MILLIGRAM(S): 10 TABLET, FILM COATED ORAL at 12:47

## 2023-01-25 RX ADMIN — LISINOPRIL 40 MILLIGRAM(S): 2.5 TABLET ORAL at 05:37

## 2023-01-25 RX ADMIN — LEVETIRACETAM 1000 MILLIGRAM(S): 250 TABLET, FILM COATED ORAL at 05:37

## 2023-01-25 RX ADMIN — AMLODIPINE BESYLATE 5 MILLIGRAM(S): 2.5 TABLET ORAL at 05:37

## 2023-01-25 RX ADMIN — ENOXAPARIN SODIUM 40 MILLIGRAM(S): 100 INJECTION SUBCUTANEOUS at 12:47

## 2023-01-25 RX ADMIN — Medication 1 DROP(S): at 17:38

## 2023-01-25 RX ADMIN — GABAPENTIN 300 MILLIGRAM(S): 400 CAPSULE ORAL at 17:38

## 2023-01-25 RX ADMIN — Medication 1 DROP(S): at 05:37

## 2023-01-25 RX ADMIN — LAMOTRIGINE 100 MILLIGRAM(S): 25 TABLET, ORALLY DISINTEGRATING ORAL at 05:37

## 2023-01-25 RX ADMIN — LEVETIRACETAM 1000 MILLIGRAM(S): 250 TABLET, FILM COATED ORAL at 17:37

## 2023-01-25 RX ADMIN — GABAPENTIN 300 MILLIGRAM(S): 400 CAPSULE ORAL at 05:37

## 2023-01-25 RX ADMIN — LAMOTRIGINE 100 MILLIGRAM(S): 25 TABLET, ORALLY DISINTEGRATING ORAL at 17:38

## 2023-01-25 NOTE — PHYSICAL THERAPY INITIAL EVALUATION ADULT - NSPTDMEREC_GEN_A_CORE
RW: ambulate with rolling walker due to decreased balance and increased risk of falling/rolling walker

## 2023-01-25 NOTE — PROGRESS NOTE ADULT - ASSESSMENT
67 year-old right-handed male w/ PMHx epilepsy, developmental delay, AOx3, paranoid schizophrenia, HLD, HTN, h/o spinal surgery, ?CKD, VAZQUEZ (CPAP at night), who presents to [facility] ED due to three seizure-like episodes prior to arrival on 1/21/23. Patient with sister at bedside providing collateral. Patient with seizure-like activity on night prior to arrival, another earlier in the day of arrival, and another en route to the ED with EMS. Patient's epilepsy had been controlled on levetiracetam, lamotrigine, and also reportedly gabapentin. Patient was on lamotrigine 100 mg BID, then, due to concern for polypharmacy, was titrated down to 100mg QD "a couple of months ago," and discontinued on 1/10/23. Patient was continued on levetiracetam 1,000mg BID and gabapentin 300mg BID, per sister. Patient follows with Dr. Lopez for outpatient Neurology. Per family, patient had not had a seizure in years.    Home Anti-Seizure medications: LEV 1000mg BID, LTG 100mg BID, GBP 300mg BID (unclear if this is for sz VS neuropathy)    Impression: Breakthrough seizures / status epilepticus in setting of recent discontinuation of lamotrigine. Suspect seizures provoked by AED discontinuation.    PLAN and MANAGEMENT:  - discontinue lacosamide 50 mg BID  - c/w levetiracetam 1000 mg BID   - c/w lamotrigine 100 mg BID  - discontinue Ceftriaxone (tolerated well, no adverse effects noted- denies SOB,  difficulty breathing, chest pain)  - d/c maintenance IVF at 75 ml/hr, CK > 800. Repeat CK ordered for 1/25/23  - continuous vEEG   - c/w CPAP started last night by RT- FIO2 at 80%, Expiratory pressure 13  - Febrile at 101.7 in ER on 1/21/23, CXR with L pleural effusion VS atelectasis, CT chest showed linear and patchy peripheral opacities at the lung apices, may represent atelectasis and/or scarring, followup CT is recommended in 6 months (June 22nd 2023) for surveillance. Medicine following  - On Ct 1/21/23 - Right thyroid lobe nodule. A nonemergent thyroid US is recommended for further evaluation. Ordered, US 1/25/23  - soft and bite size with thin liquids as per s/s (Dentures at home with sisterLeidy Norman)  - as per Medicine, c/w PO lisinopril 40 mg, if BP remains elevated, can start Norvasc 5mg qd, cont statin. Awaiting recs on elevated BUN/Creat with decreased GFR  - PT/OT evaluation requested, pending      [] First line: lorazepam 1mg IV PRN for seizure activity lasting >3-5mins, >2x/hr, >3x/day, or if GTC , repeat after 1 minute (max dose 0.1 mg/kg)    [] Second line: valproic acid 1,000mg IV PRN for seizure activity lasting >3-5mins, >2x/hr, >3x/day, or if GTC , repeat after 1 minute (max dose 0.1 mg/kg)    [] Seizure, fall and aspiration precautions. Avoid sleep deprivation.   [] Given concern for seizure, advise the patient with regards to risks and driving privileges associated with the [place] Guidelines. Advise patient regarding the risk of seizures and general seizure safety recommendations including not to be bathing alone, climbing to high places and operating heavy machinery, until cleared by follow-up outpatient Neurology. Reinforce the importance of compliance with medications. Discuss sleep hygiene and the risks of sleep disruption. Discuss the risk of death associated with seizures / SUDEP.    CORE MEASURES:        AED levels: [x] Sent [x] Pending [] Resulted     LFTs: ALT mildly elevated at 42 on 1/22/23     Plan and education provided to: Patient and family at bedside    Seizure Semiology  [] Tonic clonic  [] Clonic  [] Tonic  [] Unresponsive  [] Focal with impaired awareness  [] Focal without impaired awareness    Obtain screening lower extremity venous ultrasound in patients who meet 1 or more of the following criteria as patient is high risk for DVT/PE on admission:   [] History of DVT/PE  []Hypercoagulable states (Factor V Leiden, Cancer, OCP, etc. )  []Prolonged immobility (hemiplegia/hemiparesis/post operative or any other extended immobilization)  [] Transferred from outside facility (Rehab or Long term care)     67 year-old right-handed male w/ PMHx epilepsy, developmental delay, AOx3, paranoid schizophrenia, HLD, HTN, h/o spinal surgery, ?CKD, VAZQUEZ (CPAP at night), who presents to [facility] ED due to three seizure-like episodes prior to arrival on 1/21/23. Patient with sister at bedside providing collateral. Patient with seizure-like activity on night prior to arrival, another earlier in the day of arrival, and another en route to the ED with EMS. Patient's epilepsy had been controlled on levetiracetam, lamotrigine, and also reportedly gabapentin. Patient was on lamotrigine 100 mg BID, then, due to concern for polypharmacy, was titrated down to 100mg QD "a couple of months ago," and discontinued on 1/10/23. Patient was continued on levetiracetam 1,000mg BID and gabapentin 300mg BID, per sister. Patient follows with Dr. Lopez for outpatient Neurology. Per family, patient had not had a seizure in years.    Home Anti-Seizure medications: LEV 1000mg BID, LTG 100mg BID, GBP 300mg BID (unclear if this is for sz VS neuropathy)    Impression: Breakthrough seizures / status epilepticus in setting of recent discontinuation of lamotrigine. Suspect seizures provoked by AED discontinuation.    PLAN and MANAGEMENT:  - discontinue lacosamide 50 mg BID  - c/w levetiracetam 1000 mg BID   - c/w lamotrigine 100 mg BID  - discontinue Ceftriaxone (tolerated well, no adverse effects noted- denies SOB,  difficulty breathing, chest pain)  - d/c maintenance IVF at 75 ml/hr, CK > 800. Repeat CK ordered for 1/25/23  - d/c continuous vEEG   - c/w CPAP started last night by RT- FIO2 at 80%, Expiratory pressure 13  - Febrile at 101.7 in ER on 1/21/23, CXR with L pleural effusion VS atelectasis, CT chest showed linear and patchy peripheral opacities at the lung apices, may represent atelectasis and/or scarring, followup CT is recommended in 6 months (June 22nd 2023) for surveillance. Medicine following  - On Ct 1/21/23 - Right thyroid lobe nodule. A nonemergent thyroid US is recommended for further evaluation. Ordered, US 1/25/23  - soft and bite size with thin liquids as per s/s (Dentures at home with sister- Ester)  - as per Medicine, c/w PO lisinopril 40 mg, if BP remains elevated, can start Norvasc 5mg qd, cont statin. Awaiting recs on elevated BUN/Creat with decreased GFR  - PT/OT evaluation -okay to d/c to home with services in place      [] First line: lorazepam 1mg IV PRN for seizure activity lasting >3-5mins, >2x/hr, >3x/day, or if GTC , repeat after 1 minute (max dose 0.1 mg/kg)    [] Second line: valproic acid 1,000mg IV PRN for seizure activity lasting >3-5mins, >2x/hr, >3x/day, or if GTC , repeat after 1 minute (max dose 0.1 mg/kg)    [] Seizure, fall and aspiration precautions. Avoid sleep deprivation.   [] Given concern for seizure, advise the patient with regards to risks and driving privileges associated with the [place] Guidelines. Advise patient regarding the risk of seizures and general seizure safety recommendations including not to be bathing alone, climbing to high places and operating heavy machinery, until cleared by follow-up outpatient Neurology. Reinforce the importance of compliance with medications. Discuss sleep hygiene and the risks of sleep disruption. Discuss the risk of death associated with seizures / SUDEP.    CORE MEASURES:        AED levels: [x] Sent [x] Pending [] Resulted     LFTs: ALT mildly elevated at 42 on 1/22/23     Plan and education provided to: Patient and family at bedside    Seizure Semiology  [] Tonic clonic  [] Clonic  [] Tonic  [] Unresponsive  [] Focal with impaired awareness  [] Focal without impaired awareness    Obtain screening lower extremity venous ultrasound in patients who meet 1 or more of the following criteria as patient is high risk for DVT/PE on admission:   [] History of DVT/PE  []Hypercoagulable states (Factor V Leiden, Cancer, OCP, etc. )  []Prolonged immobility (hemiplegia/hemiparesis/post operative or any other extended immobilization)  [] Transferred from outside facility (Rehab or Long term care)

## 2023-01-25 NOTE — DISCHARGE NOTE PROVIDER - NSDCFUSCHEDAPPT_GEN_ALL_CORE_FT
Ekaterina Gillis  [facility]  [address]  Scheduled Appointment: 04/05/2023

## 2023-01-25 NOTE — PHYSICAL THERAPY INITIAL EVALUATION ADULT - PERTINENT HX OF CURRENT PROBLEM, REHAB EVAL
67-year-old male with past medical history of seizure disorder, coming in on 1/22/23 from home for evaluation of 3 seizures.  Per sister, patient had seizure last night, earlier today and 1 in route with EMS.  Patient was recently taken off his Lamictal due to concern for polypharmacy from his neurologist.  Patient currently takes Keppra 1000 mg twice daily and sister reports Neurontin 300 mg twice daily for seizure disorder.  Patient able to answer yes or no questions but is unable to provide additional history.  Unable to provide his name on evaluation in the ED.  Concern for postictal period.   Also concern for head strike as patient with abrasions on body. No hx of substance misuse per sister. CT Chest 1/22/23 Linear and patchy peripheral opacities at the lung apices, nonspecific yet may represent atelectasis and/or scarring, followup CT is recommended in 6 months for surveillance. CT Cervical 1/21/23 No acute intracranial hemorrhage, territorial infarct, mass effect or calvarial fracture. No cervical spine fracture or traumatic spondylolisthesis.Right thyroid lobe nodule. A nonemergent thyroid ultrasound is recommended for further evaluation. Chest XR 1/21/23 Diffuse hazy opacification of the left hemithorax, may be due to layering pleural effusion and/or atelectasis. The right lung is clear.

## 2023-01-25 NOTE — DISCHARGE NOTE NURSING/CASE MANAGEMENT/SOCIAL WORK - PATIENT PORTAL LINK FT
You can access the FollowMyHealth Patient Portal offered by [facility] by registering at the following website: [email or web address]. By joining adjust’s FollowMyHealth portal, you will also be able to view your health information using other applications (apps) compatible with our system.

## 2023-01-25 NOTE — PHYSICAL THERAPY INITIAL EVALUATION ADULT - PLANNED THERAPY INTERVENTIONS, PT EVAL
balance training/gait training/strengthening/transfer training GOAL: patient will be able to negotiated 10 stairs with supervision with one HR in 2 weeks/balance training/gait training/strengthening/transfer training

## 2023-01-25 NOTE — OCCUPATIONAL THERAPY INITIAL EVALUATION ADULT - ADDITIONAL COMMENTS
Pt lives in an apartment alone with 5 steps to enter, +tub. Pt states an aid comes to the apartment 3 hour/day 5 days/week to assist with cleaning, driving to the store (grocery shop), laundry and go on walks. Pt. states he is able to cook on his own. Pt states he uses a cane on the R for all ADLs/IADL

## 2023-01-25 NOTE — EEG REPORT - NS EEG TEXT BOX
Day 3 	Start: 1/24/2023  08:00 AM     	End: 1/25/2023  08:00  	Duration: 24 hours    Daily EEG Visual Analysis    FINDINGS:  The background was continuous, symmetric, spontaneously variable and reactive. During wakefulness, the posterior dominant rhythm consisted of symmetric, well-modulated 7.5 Hz activity, with amplitude to 30 uV, that attenuated to eye opening.  Low amplitude frontal beta was noted in wakefulness. Anterior to posterior gradient is present. No Breach rhythms.     Background Slowing:  Diffuse theta and delta slowing.     Focal Slowing:   Intermittent theta and polymorphic delta slowing in the left temporal region.     Sleep Background:  Drowsiness was characterized by fragmentation, attenuation, and slowing of the background activity.    Sleep was characterized by the presence of vertex waves, symmetric sleep spindles and K-complexes.    Other Non-Epileptiform Findings:  None were present.    Activation Procedures:   Hyperventilation was not performed.    Photic stimulation was not performed.    Interictal Epileptiform Activity:   None were present.    Events:  No events or seizures recorded.    Artifacts:  Intermittent myogenic and movement artifacts were noted.    ECG:  The heart rate on single channel ECG was predominantly between 60-70 BPM.    AEDs:   MEDICATIONS  (STANDING):  gabapentin 300 milliGRAM(s) Oral two times a day  lacosamide IVPB 50 milliGRAM(s) IV Intermittent every 12 hours  lamoTRIgine 100 milliGRAM(s) Oral two times a day      EEG Summary:      Abnormal EEG in the awake, drowsy and asleep states.    Intermittent left temporal slowing in theta/ polymorphic delta range  Mild generalized background slowing      Impression/Clinical Correlate:    This is an abnormal EEG record.       No epileptiform pattern or seizures were seen.  Regional functional abnormality in the left temporal region.   Mild moderate slowing, nonspecific as etiology      ------------------------------------  EEG Reading Room: [phone]  On Call Service After Hours: [phone]     Day 3 	Start: 1/24/2023  08:00 AM     	End: 1/25/2023  11:19 AM   	Duration: 27 hours 19 min     Daily EEG Visual Analysis    FINDINGS:  The background was continuous, symmetric, spontaneously variable and reactive. During wakefulness, the posterior dominant rhythm consisted of symmetric, well-modulated 7.5 Hz activity, with amplitude to 30 uV, that attenuated to eye opening.  Low amplitude frontal beta was noted in wakefulness. Anterior to posterior gradient is present. No Breach rhythms.     Background Slowing:  Diffuse theta and delta slowing.     Focal Slowing:   Intermittent theta and polymorphic delta slowing in the left temporal region.     Sleep Background:  Drowsiness was characterized by fragmentation, attenuation, and slowing of the background activity.    Sleep was characterized by the presence of vertex waves, symmetric sleep spindles and K-complexes.    Other Non-Epileptiform Findings:  None were present.    Activation Procedures:   Hyperventilation was not performed.    Photic stimulation was not performed.    Interictal Epileptiform Activity:   None were present.    Events:  No events or seizures recorded.    Artifacts:  Intermittent myogenic and movement artifacts were noted.    ECG:  The heart rate on single channel ECG was predominantly between 60-70 BPM.    AEDs:   MEDICATIONS  (STANDING):  gabapentin 300 milliGRAM(s) Oral two times a day  lacosamide IVPB 50 milliGRAM(s) IV Intermittent every 12 hours  lamoTRIgine 100 milliGRAM(s) Oral two times a day      EEG Summary:      Abnormal EEG in the awake, drowsy and asleep states.    Intermittent left temporal slowing in theta/ polymorphic delta range  Mild generalized background slowing      Impression/Clinical Correlate:    This is an abnormal EEG record.       No epileptiform pattern or seizures were seen.  Regional functional abnormality in the left temporal region.   Mild moderate slowing, nonspecific as etiology      ------------------------------------  EEG Reading Room: [phone]  On Call Service After Hours: [phone]

## 2023-01-25 NOTE — PROGRESS NOTE ADULT - NS ATTEND AMEND GEN_ALL_CORE FT
Patient stable for discharge home, back on previous AED regimen.  No seizures recorded on EEG.    Shyam Rojas MD  Neurology Attending Physician
Team discussed AEDs with primary neurologist, plan to resume prior AED regimen which patient had been stable on, Lamotrigine and Keppra.  continue VEEG monitoring, seizure precautions.    Shyam Rojas MD  Neurology Attending Physician
Patient stable, and EEG without any epileptiform abnormalities.  Team discussed plan with primary neurologist, Dr Lopez, who recommends getting patient back on original AED regimen of LTG and LEV.  Will transition back to these AEDs, continue VEEG monitoring.    Shyam Rojas MD  Neurology Attending Physician

## 2023-01-25 NOTE — DISCHARGE NOTE PROVIDER - CARE PROVIDER_API CALL
Ronda Lopez)  Clinical Neurophysiology; Neurology  [address] [place] [address]  Phone: [phone]  Fax: [phone]  Follow Up Time:

## 2023-01-25 NOTE — PHYSICAL THERAPY INITIAL EVALUATION ADULT - ADDITIONAL COMMENTS
Pt lives in an apartment alone with 5 steps to enter, +tub. Pt states an aid comes to the apartment 3 hour/day 5 days/week to assist with cleaning, driving to the store (grocery shop), laundry and go on walks. Pt. states he is able to cook on his own. Pt states he uses a cane on the R for all ADLs/IADLs.

## 2023-01-25 NOTE — PHYSICAL THERAPY INITIAL EVALUATION ADULT - BALANCE TRAINING, PT EVAL
GOAL: patient will be able to improve dynamic standing  balance from fair to fair(+) with supervision in 2 weeks

## 2023-01-25 NOTE — DISCHARGE NOTE PROVIDER - NSDCCPCAREPLAN_GEN_ALL_CORE_FT
PRINCIPAL DISCHARGE DIAGNOSIS  Diagnosis: Seizure  Assessment and Plan of Treatment: Follow up with neurologist within 2 weeks of discharge. Follow up with PCP within 2 weeks of discharge. Will need repeat chest ct scan for surveillance- linear and patchy peripheral opacities at the lung apices, nonspecific yet may represent atelectasis and/or scarring, followup CT is recommended in 6 months for surveillance (June 2023). Also ultrasound of right thyroid nodule for thyroid nodule.

## 2023-01-25 NOTE — PHYSICAL THERAPY INITIAL EVALUATION ADULT - ACTIVE RANGE OF MOTION EXAMINATION, REHAB EVAL
tonya. upper extremity Active ROM was WNL (within normal limits)/bilateral lower extremity Active ROM was WNL (within normal limits)

## 2023-01-25 NOTE — PROGRESS NOTE ADULT - SUBJECTIVE AND OBJECTIVE BOX
THE PATIENT WAS SEEN AND EXAMINED BY ME WITH THE HOUSESTAFF DURING MORNING ROUNDS.     67 year-old right-handed male w/ PMHx epilepsy, developmental delay, AOx3, paranoid schizophrenia, HLD, HTN, h/o spinal surgery, ?CKD, VAZQUEZ (CPAP at night), who presents to [facility] ED due to three seizure-like episodes prior to arrival judy 1/21/23. Patient with sister at bedside providing collateral. Patient with seizure-like activity on night prior to arrival, another earlier in the day of arrival, and another en route to the ED with EMS. Patient's epilepsy had been controlled on levetiracetam, lamotrigine, and also reportedly gabapentin. Patient was on lamotrigine 100 mg BID, then, due to concern for polypharmacy, was titrated down to 100mg QD "a couple of months ago," and discontinued on 1/10/23. Patient was continued on levetiracetam 1,000mg BID and gabapentin 300mg BID, per sister. Patient follows with Dr. Lopez for outpatient Neurology.    While in the ED, patient with witnessed GTC in the hallway lasting 30 seconds.    ROS: Otherwise negative unless stated in the HPI    SUBJECTIVE: No events overnight.  No new neurologic complaints.      amLODIPine   Tablet 5 milliGRAM(s) Oral daily  artificial tears (preservative free) Ophthalmic Solution 1 Drop(s) Both EYES two times a day  atorvastatin 20 milliGRAM(s) Oral at bedtime  enoxaparin Injectable 40 milliGRAM(s) SubCutaneous every 24 hours  escitalopram Solution 15 milliGRAM(s) Oral daily  gabapentin 300 milliGRAM(s) Oral two times a day  lamoTRIgine 100 milliGRAM(s) Oral two times a day  levETIRAcetam 1000 milliGRAM(s) Oral two times a day  lisinopril 40 milliGRAM(s) Oral daily  LORazepam   Injectable 1 milliGRAM(s) IV Push once PRN  lurasidone 40 milliGRAM(s) Oral at bedtime  valproate sodium  IVPB 1000 milliGRAM(s) IV Intermittent once PRN      Physical Examination:  Constitutional: + Obese, pleasant appearing, in no apparent distress         Cardiovascular: Denies chest pain, SOB. No pitting edema, skin warm-to-touch    Respiratory: Denies cough currently, endorsed one isolated episode of coughing since admission but none since    Neurological Examination:  Mental Status: Alert and awake, able to state name, date and place, Able to follow simple commands    Cranial Nerves: Pupils are equal, round, and reactive to light; face is grossly symmetric at rest    Motor/Strength Testing: Full strength throughout    Sensory: Intact throughout    Gait: Gait not tested      LABS:                        13.7   6.69  )-----------( 151      ( 24 Jan 2023 06:29 )             42.5    01-24    145  |  110<H>  |  25<H>  ----------------------------<  94  3.7   |  23  |  1.61<H>    Ca    8.6      24 Jan 2023 06:29  Phos  3.1     01-24  Mg     2.2     01-24    IMAGING/EEG:     EEG 1/24/23:  EEG Summary:  Abnormal EEG in the awake, drowsy and asleep states.  - Intermittent RT slowing in theta range  - No seizures  Impression/Clinical Correlate:  This is an abnormal EEG record.   - No epileptiform pattern or seizures were seen.  - Functional abnormality over right temporal region.  - Mild moderate slowing, nonspecific as etiology    EEG 1/23/23  EEG Summary:  Abnormal EEG in the awake, drowsy and asleep states.  - Intermittent RT slowing in theta range  - No seizures  Impression/Clinical Correlate:  This is an abnormal EEG record.   - No epileptiform pattern or seizures were seen.  - Functional abnormality over right temporal region.  - Mild moderate slowing, nonspecific as etiology    CHEST CT Non- Con 1/22/23:  Linear and patchy peripheral opacities at the lung apices, nonspecific yet may represent atelectasis and/or scarring, followup CT is recommended in 6 months for surveillance.    CT Head No Cont/CT Cervical Spine Non-Con 1/21/23:  No acute intracranial hemorrhage, territorial infarct, mass effect or calvarial fracture.  No cervical spine fracture or traumatic spondylolisthesis.  Right thyroid lobe nodule. A nonemergent thyroid ultrasound is recommended for further evaluation.    Chest X-Ray 1/21/23:  Diffuse hazy opacification of the left hemithorax, may be due to layering   pleural effusion and/or atelectasis. The right lung is clear.     THE PATIENT WAS SEEN AND EXAMINED BY ME WITH THE HOUSESTAFF DURING MORNING ROUNDS.     67 year-old right-handed male w/ PMHx epilepsy, developmental delay, AOx3, paranoid schizophrenia, HLD, HTN, h/o spinal surgery, ?CKD, VAZQUEZ (CPAP at night), who presents to [facility] ED due to three seizure-like episodes prior to arrival judy 1/21/23. Patient with sister at bedside providing collateral. Patient with seizure-like activity on night prior to arrival, another earlier in the day of arrival, and another en route to the ED with EMS. Patient's epilepsy had been controlled on levetiracetam, lamotrigine, and also reportedly gabapentin. Patient was on lamotrigine 100 mg BID, then, due to concern for polypharmacy, was titrated down to 100mg QD "a couple of months ago," and discontinued on 1/10/23. Patient was continued on levetiracetam 1,000mg BID and gabapentin 300mg BID, per sister. Patient follows with Dr. Lopez for outpatient Neurology.    While in the ED, patient with witnessed GTC in the hallway lasting 30 seconds.    ROS: Otherwise negative unless stated in the HPI    SUBJECTIVE: No events overnight.  No new neurologic complaints.      amLODIPine   Tablet 5 milliGRAM(s) Oral daily  artificial tears (preservative free) Ophthalmic Solution 1 Drop(s) Both EYES two times a day  atorvastatin 20 milliGRAM(s) Oral at bedtime  enoxaparin Injectable 40 milliGRAM(s) SubCutaneous every 24 hours  escitalopram Solution 15 milliGRAM(s) Oral daily  gabapentin 300 milliGRAM(s) Oral two times a day  lamoTRIgine 100 milliGRAM(s) Oral two times a day  levETIRAcetam 1000 milliGRAM(s) Oral two times a day  lisinopril 40 milliGRAM(s) Oral daily  LORazepam   Injectable 1 milliGRAM(s) IV Push once PRN  lurasidone 40 milliGRAM(s) Oral at bedtime  valproate sodium  IVPB 1000 milliGRAM(s) IV Intermittent once PRN      Physical Examination:  Constitutional: + Obese, pleasant appearing, in no apparent distress         Cardiovascular: Denies chest pain, SOB. No pitting edema, skin warm-to-touch    Respiratory: Denies cough currently, endorsed one isolated episode of coughing since admission but none since    Neurological Examination:  Mental Status: Alert and awake, able to state name, date and place, Able to follow simple commands    Cranial Nerves: Pupils are equal, round, and reactive to light; face is grossly symmetric at rest    Motor/Strength Testing: Full strength throughout    Sensory: Intact throughout    Gait: Gait not tested      LABS:                        13.7   6.69  )-----------( 151      ( 24 Jan 2023 06:29 )             42.5    01-24    145  |  110<H>  |  25<H>  ----------------------------<  94  3.7   |  23  |  1.61<H>    Ca    8.6      24 Jan 2023 06:29  Phos  3.1     01-24  Mg     2.2     01-24    IMAGING/EEG:     EEG Report 1/25/23  EEG Summary:  Abnormal EEG in the awake, drowsy and asleep states.  - Intermittent left temporal slowing in theta/ polymorphic delta range  -Mild generalized background slowing  Impression/Clinical Correlate:  This is an abnormal EEG record.   - No epileptiform pattern or seizures were seen.  - Regional functional abnormality in the left temporal region.   - Mild moderate slowing, nonspecific as etiology  EEG 1/24/23:  EEG Summary:  Abnormal EEG in the awake, drowsy and asleep states.  - Intermittent RT slowing in theta range  - No seizures  Impression/Clinical Correlate:  This is an abnormal EEG record.   - No epileptiform pattern or seizures were seen.  - Functional abnormality over right temporal region.  - Mild moderate slowing, nonspecific as etiology    EEG 1/23/23  EEG Summary:  Abnormal EEG in the awake, drowsy and asleep states.  - Intermittent RT slowing in theta range  - No seizures  Impression/Clinical Correlate:  This is an abnormal EEG record.   - No epileptiform pattern or seizures were seen.  - Functional abnormality over right temporal region.  - Mild moderate slowing, nonspecific as etiology    CHEST CT Non- Con 1/22/23:  Linear and patchy peripheral opacities at the lung apices, nonspecific yet may represent atelectasis and/or scarring, followup CT is recommended in 6 months for surveillance.    CT Head No Cont/CT Cervical Spine Non-Con 1/21/23:  No acute intracranial hemorrhage, territorial infarct, mass effect or calvarial fracture.  No cervical spine fracture or traumatic spondylolisthesis.  Right thyroid lobe nodule. A nonemergent thyroid ultrasound is recommended for further evaluation.    Chest X-Ray 1/21/23:  Diffuse hazy opacification of the left hemithorax, may be due to layering   pleural effusion and/or atelectasis. The right lung is clear.

## 2023-01-25 NOTE — PHYSICAL THERAPY INITIAL EVALUATION ADULT - GAIT DEVIATIONS NOTED, PT EVAL
decreased jennifer/increased time in double stance/decreased velocity of limb motion/decreased step length/decreased stride length

## 2023-01-25 NOTE — PROGRESS NOTE ADULT - REASON FOR ADMISSION
Seizures after discontinuation of lamotrigine outpatient

## 2023-01-25 NOTE — PROGRESS NOTE ADULT - ASSESSMENT
68 yo M PMHx epilepsy, developmental delay, paranoid schizophrenia, HLD, HTN, h/o spinal surgery, CKD, VAZQUEZ (CPAP at night), who presents to [facility] ED due to three seizure-like episodes prior to arrival. medicine consult for co management.    # Breakthrough seizures / status epilepticus  # HTN, HLD  # CKD  # VAZQUEZ    infectious ramos negative  EEG ongoing  AED per neuro  passed S&S, stop IV BP meds  cont PO lisinopril and norvasc 5mg qd  cont statin  PT  ckd is stable outpatient cr is around 1.6 , no contraindication from medicine aspect for discharge.   rest per neuro    dvt ppx per primary team    Please contact with any questions or concerns [phone].

## 2023-01-25 NOTE — PHYSICAL THERAPY INITIAL EVALUATION ADULT - NSPTDISCHREC_GEN_A_CORE
Home with physical therapy at home for safety assesssment within home environment and increase strength, balance and endurance to improve all functional mobility./Home PT

## 2023-01-25 NOTE — DISCHARGE NOTE PROVIDER - HOSPITAL COURSE
67 year-old right-handed male w/ PMHx epilepsy, developmental delay, AOx3, paranoid schizophrenia, HLD, HTN, h/o spinal surgery, ?CKD, VAZQUEZ (CPAP at night), who presents to [facility] ED due to three seizure-like episodes prior to arrival judy 1/21/23. Patient with sister at bedside providing collateral. Patient with seizure-like activity on night prior to arrival, another earlier in the day of arrival, and another en route to the ED with EMS. Patient's epilepsy had been controlled on levetiracetam, lamotrigine, and also reportedly gabapentin. Patient was on lamotrigine 100 mg BID, then, due to concern for polypharmacy, was titrated down to 100mg QD "a couple of months ago," and discontinued on 1/10/23. Patient was continued on levetiracetam 1,000mg BID and gabapentin 300mg BID, per sister. Patient follows with Dr. Lopez for outpatient Neurology.    1/21/23  through 1/25/23    During course of admission, pt was safely loaded with lacosamide 200mg then maintained on lacosamide 150mg twice per day which was then decreased to 50 mg BID and then ultimately weaned off. Patient was also given levetiracetam 1,000mg x 1 and 2,000mg x 1 in the ED;  which was then decreased from 1,000mg BID to 750mg BID (adjusted to account for SHAGUFTA on CKD); he was then increased to 1000 mg  levetiracetam BID. Patient was then started on lamotrigine 100 mg BID and maintained on this regimen throughout remainder of hospital stay.   Continuous Video EEG remained in progress to capture any potential seizures. As per EEG reports -abnormal EEG record but no epileptiform pattern or seizures were seen on admission (see report attached).   Pt remained on CPAP at night on FIO2 at 80 %. Pt was started on empiric antibiotic -Ceftriaxone but was later discontinued as per Medicine due to lack of infection. CXR showed L pleural effusion VS atelectasis on 1/21/23, CT chest showed linear and patchy peripheral opacities at the lung apices, may represent atelectasis and/or scarring, followup CT is recommended in 6 months (June 22nd 2023) for surveillance.  On Ct 1/21/23 - Incidental Right thyroid lobe nodule. A nonemergent thyroid US is recommended for further evaluation as an outpatient. Speech/Swallow recommendations were to continue soft and bite size diet with thin liquids but on re-assessment given functional tolerance of regular solids on re-assessment pt is cleared to upgrade diet to regular following d/c with dentures in place at home. Physical therapy recommends home physical therapy with rolling walker 1 to 2 times per week x 2 weeks duration due to decreased balance and increased risk of falling. Safety assessment within home environment to help increase strength, balance and endurance to improve all functional mobility while maintaining safety.    Diagnostics/EEG Results:    EEG 1/25/23  EEG Summary:  Abnormal EEG in the awake, drowsy and asleep states.  - Intermittent left temporal slowing in theta/ polymorphic delta range  - Mild generalized background slowing  Impression/Clinical Correlate:  This is an abnormal EEG record.   - No epileptiform pattern or seizures were seen.  - Regional functional abnormality in the left temporal region.   - Mild moderate slowing, nonspecific as etiology  EEG 1/24/23:  EEG Summary:  Abnormal EEG in the awake, drowsy and asleep states.  - Intermittent RT slowing in theta range  - No seizures  Impression/Clinical Correlate:  This is an abnormal EEG record.    No epileptiform pattern or seizures were seen.  - Functional abnormality over right temporal region.  - Mild moderate slowing, nonspecific as etiology    EEG 1/23/23  EEG Summary:  Abnormal EEG in the awake, drowsy and asleep states.  - Intermittent RT slowing in theta range  - No seizures  Impression/Clinical Correlate:  This is an abnormal EEG record.   - No epileptiform pattern or seizures were seen.  - Functional abnormality over right temporal region.  - Mild moderate slowing, nonspecific as etiology    CHEST CT Non- Con 1/22/23:  Linear and patchy peripheral opacities at the lung apices, nonspecific yet may represent atelectasis and/or scarring, followup CT is recommended in 6 months for surveillance.    CT Head No Cont/CT Cervical Spine Non-Con 1/21/23:  No acute intracranial hemorrhage, territorial infarct, mass effect or calvarial fracture.  No cervical spine fracture or traumatic spondylolisthesis.  Right thyroid lobe nodule. A nonemergent thyroid ultrasound is recommended for further evaluation.    Chest X-Ray 1/21/23:  Diffuse hazy opacification of the left hemithorax, may be due to layering   pleural effusion and/or atelectasis. The right lung is clear.

## 2023-01-25 NOTE — PROGRESS NOTE ADULT - SUBJECTIVE AND OBJECTIVE BOX
Patient is a 67y old  Male who presents with a chief complaint of Seizures after discontinuation of lamotrigine outpatient (25 Jan 2023 12:29)      SUBJECTIVE / OVERNIGHT EVENTS:  Patient seen and examined.   Doing well.   No complaints.       Vital Signs Last 24 Hrs  T(C): 36.7 (25 Jan 2023 12:24), Max: 37.5 (24 Jan 2023 20:11)  T(F): 98 (25 Jan 2023 12:24), Max: 99.5 (24 Jan 2023 20:11)  HR: 75 (25 Jan 2023 12:24) (59 - 91)  BP: 155/78 (25 Jan 2023 12:24) (134/86 - 188/95)  BP(mean): --  RR: 18 (25 Jan 2023 12:24) (18 - 18)  SpO2: 98% (25 Jan 2023 12:24) (94% - 100%)    Parameters below as of 25 Jan 2023 04:29  Patient On (Oxygen Delivery Method): BiPAP/CPAP      I&O's Summary    24 Jan 2023 07:01  -  25 Jan 2023 07:00  --------------------------------------------------------  IN: 480 mL / OUT: 500 mL / NET: -20 mL    25 Jan 2023 07:01  -  25 Jan 2023 13:54  --------------------------------------------------------  IN: 240 mL / OUT: 0 mL / NET: 240 mL        PE:  GENERAL: NAD, AAOx3  CHEST/LUNG: CTABL, No wheeze  HEART: Regular rate and rhythm; no murmur  ABDOMEN: Soft, Nontender, Nondistended; Bowel sounds present  EXTREMITIES:  2+ Peripheral Pulses, No edema  NEURO: No focal deficits, EEG on    LABS:                        13.7   6.69  )-----------( 151      ( 24 Jan 2023 06:29 )             42.5     01-24    145  |  110<H>  |  25<H>  ----------------------------<  94  3.7   |  23  |  1.61<H>    Ca    8.6      24 Jan 2023 06:29  Phos  3.1     01-24  Mg     2.2     01-24        CAPILLARY BLOOD GLUCOSE        CARDIAC MARKERS ( 25 Jan 2023 07:13 )  x     / x     / 105 U/L / x     / x              RADIOLOGY & ADDITIONAL TESTS:    Imaging Personally Reviewed:  [x] YES  [ ] NO    Consultant(s) Notes Reviewed:  [x] YES  [ ] NO      MEDICATIONS  (STANDING):  amLODIPine   Tablet 5 milliGRAM(s) Oral daily  artificial tears (preservative free) Ophthalmic Solution 1 Drop(s) Both EYES two times a day  atorvastatin 20 milliGRAM(s) Oral at bedtime  enoxaparin Injectable 40 milliGRAM(s) SubCutaneous every 24 hours  escitalopram Solution 15 milliGRAM(s) Oral daily  gabapentin 300 milliGRAM(s) Oral two times a day  lamoTRIgine 100 milliGRAM(s) Oral two times a day  levETIRAcetam 1000 milliGRAM(s) Oral two times a day  lisinopril 40 milliGRAM(s) Oral daily  lurasidone 40 milliGRAM(s) Oral at bedtime    MEDICATIONS  (PRN):  LORazepam   Injectable 1 milliGRAM(s) IV Push once PRN Seizure Activity  valproate sodium  IVPB 1000 milliGRAM(s) IV Intermittent once PRN Seizure Activity      Care Discussed with Consultants/Other Providers [x] YES  [ ] NO    HEALTH ISSUES - PROBLEM Dx:

## 2023-01-25 NOTE — DISCHARGE NOTE PROVIDER - NSDCMRMEDTOKEN_GEN_ALL_CORE_FT
amLODIPine 5 mg oral tablet: 1 tab(s) orally once a day  atorvastatin 20 mg oral tablet: 1 tab(s) orally once a day  Keppra 1000 mg oral tablet: 1 tab(s) orally 2 times a day  Latuda 40 mg oral tablet: 1 tab(s) orally once a day (at bedtime)  Lexapro 10 mg oral tablet: 15 milligram(s) orally once a day  lisinopril 40 mg oral tablet: 1 tab(s) orally once a day  Neurontin 300 mg oral capsule: 1 cap(s) orally 2 times a day  ocular lubricant ophthalmic solution: 1 drop(s) to each affected eye 2 times a day   amLODIPine 5 mg oral tablet: 1 tab(s) orally once a day  atorvastatin 20 mg oral tablet: 1 tab(s) orally once a day  Keppra 1000 mg oral tablet: 1 tab(s) orally 2 times a day  lamoTRIgine 100 mg oral tablet: 1 tab(s) orally 2 times a day  Latuda 40 mg oral tablet: 1 tab(s) orally once a day (at bedtime)  Lexapro 10 mg oral tablet: 15 milligram(s) orally once a day  lisinopril 40 mg oral tablet: 1 tab(s) orally once a day  Neurontin 300 mg oral capsule: 1 cap(s) orally 2 times a day  ocular lubricant ophthalmic solution: 1 drop(s) to each affected eye 2 times a day  rolling walker : rolling walker

## 2023-01-25 NOTE — DISCHARGE NOTE NURSING/CASE MANAGEMENT/SOCIAL WORK - NSDCPEFALRISK_GEN_ALL_CORE
For information on Fall & Injury Prevention, visit: [email or web address] OR  [email or web address] OR  https://www.cdc.gov/steadi/patient.html

## 2023-01-25 NOTE — CHART NOTE - NSCHARTNOTEFT_GEN_A_CORE
67 year-old right-handed male w/ PMHx epilepsy, developmental delay, AOx3, paranoid schizophrenia, HLD, HTN, h/o spinal surgery, ?CKD, VAZQUEZ (CPAP at night), who presents to [facility] ED due to three seizure-like episodes prior to arrival. Patient with sister at bedside providing collateral. Patient with seizure-like activity on night prior to arrival, another earlier in the day of arrival, and another en route to the ED with EMS. Patient's epilepsy had been controlled on levetiracetam, lamotrigine, and also reportedly gabapentin. Patient was on lamotrigine 100 mg BID, then, due to concern for polypharmacy, was titrated down to 100mg QD "a couple of months ago," and discontinued on 1/10/23. Patient was continued on levetiracetam 1,000mg BID and gabapentin 300mg BID, per sister. Patient follows with Dr. Lopez for outpatient Neurology. While in the Ed, patient with witnessed GTC in the hallway lasting 30 seconds. (Please see ED provider note "Progress Note" section for summary of events in the ED prior to admission.)    Pt was seen by S+S on 1/23 which found a grossly functional oropharyngeal swallow sequence. No subjective signs of laryngeal penetration/aspiration observed on exam. Recommendations included soft and bite size diet (given incomplete dentition) with thin liquids. No objective testing indicated.     Today, pt seen for f/u to assess tolerance of current diet and candidacy for diet upgrade. Pt awake OOB in chair, on RA. Pleasant and cooperative, oriented x3, able to communicate needs and follow directions for exam. Reported good tolerance of current diet. Oral peripheral exam only remarkable for incomplete dentition (dentures to remain at home per pt and family request). PO trials included thin liquids and hard solids.    Impressions:  Pt is 68 y/o M admitted for seizures after discontinuation of lamotrigine. Continues to present with a grossly functional oropharyngeal swallow sequence. No subjective signs of laryngeal penetration/aspiration observed on exam.    Recommendations:   Given incomplete dentition with dentures to remain at home recommend continuing soft and bite size diet with thin liquids  Given functional tolerance of regular solids on assessment pt is cleared to upgrade diet to regular following d/c with dentures in place at home.   Objective testing is not indicated at present time  maintain upright posture during/after eating for 30 mins; position upright (90 degrees)  set up only required  Aspiration precautions  Anticipate d/c home w/ assist  Maintain good oral hygiene        Above d/w pt, RN, and neuro MD Dania Friedman ([employer])   MA CCC-SLP

## 2023-01-27 LAB
CULTURE RESULTS: SIGNIFICANT CHANGE UP
CULTURE RESULTS: SIGNIFICANT CHANGE UP
SPECIMEN SOURCE: SIGNIFICANT CHANGE UP
SPECIMEN SOURCE: SIGNIFICANT CHANGE UP

## 2023-03-23 NOTE — OCCUPATIONAL THERAPY INITIAL EVALUATION ADULT - RANGE OF MOTION EXAMINATION, UPPER EXTREMITY
Is This A New Presentation, Or A Follow-Up?: Mole bilateral UE Active ROM was WNL (within normal limits)

## 2023-04-05 ENCOUNTER — APPOINTMENT (OUTPATIENT)
Dept: PULMONOLOGY | Facility: CLINIC | Age: 68
End: 2023-04-05
Payer: MEDICARE

## 2023-04-05 VITALS
RESPIRATION RATE: 16 BRPM | HEART RATE: 62 BPM | OXYGEN SATURATION: 94 % | TEMPERATURE: 98 F | SYSTOLIC BLOOD PRESSURE: 124 MMHG | WEIGHT: 211 LBS | BODY MASS INDEX: 37.39 KG/M2 | DIASTOLIC BLOOD PRESSURE: 76 MMHG | HEIGHT: 63 IN

## 2023-04-05 PROBLEM — F20.0 PARANOID SCHIZOPHRENIA: Chronic | Status: ACTIVE | Noted: 2023-01-21

## 2023-04-05 PROBLEM — G47.33 OBSTRUCTIVE SLEEP APNEA (ADULT) (PEDIATRIC): Chronic | Status: ACTIVE | Noted: 2023-01-21

## 2023-04-05 PROBLEM — Z86.69 PERSONAL HISTORY OF OTHER DISEASES OF THE NERVOUS SYSTEM AND SENSE ORGANS: Chronic | Status: ACTIVE | Noted: 2023-01-21

## 2023-04-05 PROCEDURE — 99214 OFFICE O/P EST MOD 30 MIN: CPT

## 2023-04-05 NOTE — PHYSICAL EXAM
[Normal Appearance] : normal appearance [General Appearance - Well Developed] : well developed [General Appearance - Well Nourished] : well nourished [No Deformities] : no deformities [Normal Conjunctiva] : the conjunctiva exhibited no abnormalities [Neck Appearance] : the appearance of the neck was normal [Apical Impulse] : the apical impulse was normal [Heart Rate And Rhythm] : heart rate was normal and rhythm regular [Heart Sounds] : normal S1 and S2 [Heart Sounds Gallop] : no gallops [] : no respiratory distress [Respiration, Rhythm And Depth] : normal respiratory rhythm and effort [Exaggerated Use Of Accessory Muscles For Inspiration] : no accessory muscle use [Auscultation Breath Sounds / Voice Sounds] : lungs were clear to auscultation bilaterally [Involuntary Movements] : no involuntary movements were seen [Abnormal Walk] : normal gait [Nail Clubbing] : no clubbing of the fingernails [Cyanosis, Localized] : no localized cyanosis [Skin Color & Pigmentation] : normal skin color and pigmentation [No Focal Deficits] : no focal deficits [Oriented To Time, Place, And Person] : oriented to person, place, and time [Impaired Insight] : insight and judgment were intact

## 2023-04-05 NOTE — ASSESSMENT
[FreeTextEntry1] : 67 year old male with severe VAZQUEZ on CPAP here for a follow up visit. Therapeutic and compliance data download reveals usage 100% of days with an average use of 12 hours and 43 minutes. Therapy based AHI is 10.8/hr on 13 cm H2O. The patient is experiencing benefit from CPAP and should continue to use.  Switched to APAP 13 to 20 cm H2O as he is experiencing residual events.  Advised him to contact [facility] [employer] for the correct mask. \par \par Follow-up in 6 months, sooner if needed.

## 2023-04-05 NOTE — HISTORY OF PRESENT ILLNESS
[FreeTextEntry1] : 67 year old male with obstructive sleep apnea here for a follow up visit.\par \par Medical conditions include epilepsy, paranoid schizophrenia, depression, HTN, HLD, obesity.\par \par Split study (6/8/2011) pre-CPAP AHI 97.5/hour, CPAP portion data unavailable due to IT issues.\par CPAP (6/15/2011) AHI 10.4/hour on CPAP of 14 cm H2O\par CPAP titration (2/24/2019) RDI 0 on CPAP 13 cm H2O\par \par Device: DreamStation Replacement (9/2022)\par Setting: CPAP 13 cm H2O\par Mask: F&P Ani, Nasal Cradle\par DME: [employer]\par \par Doing well with CPAP.  Using it every night.  He reports waking up several times during the night despite CPAP use but feels that he is benefiting from it.  Last visit he was fitted with an Ani nasal fit pack which he likes and the order was placed but not received.

## 2023-07-17 ENCOUNTER — EMERGENCY (EMERGENCY)
Facility: HOSPITAL | Age: 68
LOS: 1 days | Discharge: HOME | End: 2023-07-17
Attending: EMERGENCY MEDICINE
Payer: MEDICARE

## 2023-07-17 VITALS
HEART RATE: 73 BPM | RESPIRATION RATE: 16 BRPM | WEIGHT: 227.96 LBS | SYSTOLIC BLOOD PRESSURE: 153 MMHG | HEIGHT: 63 IN | DIASTOLIC BLOOD PRESSURE: 95 MMHG | OXYGEN SATURATION: 98 % | TEMPERATURE: 99 F

## 2023-07-17 DIAGNOSIS — Z98.890 OTHER SPECIFIED POSTPROCEDURAL STATES: Chronic | ICD-10-CM

## 2023-07-17 PROCEDURE — 99284 EMERGENCY DEPT VISIT MOD MDM: CPT | Mod: 25

## 2023-07-17 PROCEDURE — 73590 X-RAY EXAM OF LOWER LEG: CPT | Mod: 26,RT

## 2023-07-17 PROCEDURE — 73610 X-RAY EXAM OF ANKLE: CPT | Mod: 26,RT

## 2023-07-17 PROCEDURE — 73610 X-RAY EXAM OF ANKLE: CPT

## 2023-07-17 PROCEDURE — 99284 EMERGENCY DEPT VISIT MOD MDM: CPT | Mod: FS

## 2023-07-17 PROCEDURE — 73590 X-RAY EXAM OF LOWER LEG: CPT

## 2023-07-17 PROCEDURE — 73620 X-RAY EXAM OF FOOT: CPT | Mod: 26,RT

## 2023-07-17 PROCEDURE — 73620 X-RAY EXAM OF FOOT: CPT

## 2023-07-17 RX ORDER — ACETAMINOPHEN 500 MG
975 TABLET ORAL ONCE
Refills: 0 | Status: COMPLETED | OUTPATIENT
Start: 2023-07-17 | End: 2023-07-17

## 2023-07-17 RX ADMIN — Medication 975 MILLIGRAM(S): at 12:57

## 2023-07-17 NOTE — ED PROVIDER NOTE - CARE PLAN
1 Principal Discharge DX:	Fall   Principal Discharge DX:	Fall  Secondary Diagnosis:	Right foot injury

## 2023-07-17 NOTE — ED ADULT NURSE NOTE - TEMPLATE LIST FOR HEAD TO TOE ASSESSMENT
23 weeks 5 days.  + fetal movement.  She denies leaking of fluid, bleeding or contractions.  AFP was negative.  Complete OB US was read as normal.  She wants her tubes removed postpartum for sterilization.  We can discuss again and sign [facility] tubal consent form next visit.  Follow up in 4 weeks.  GCT, CBC, HIV, RPR and Tdap that visit.   General

## 2023-07-17 NOTE — ED ADULT NURSE NOTE - HIV OFFER
chest pain and palpitations. Gastrointestinal: Negative for abdominal distention, abdominal pain, constipation, diarrhea, nausea and vomiting. Endocrine: Negative for polydipsia, polyphagia and polyuria. Genitourinary: Negative for difficulty urinating and dysuria. Musculoskeletal: Negative for arthralgias, back pain and myalgias. Skin: Positive for wound (Pressure ulcer to coccyx; following with Dr. Missy Washington). Negative for rash. Neurological: Negative for dizziness, weakness, light-headedness and headaches. Hematological: Negative for adenopathy. Psychiatric/Behavioral: Positive for confusion (stable) and dysphoric mood (stable). Negative for agitation and behavioral problems. The patient is nervous/anxious (stable). OBJECTIVE:      /70   Pulse 70   Temp 97.1 °F (36.2 °C)   Wt 160 lb (72.6 kg)   SpO2 96%   Breastfeeding No   BMI 34.62 kg/m²     Physical Exam  Vitals reviewed. Constitutional:       General: She is not in acute distress. Appearance: Normal appearance. She is well-developed. HENT:      Head: Normocephalic and atraumatic. Right Ear: Hearing and external ear normal.      Left Ear: Hearing and external ear normal.      Nose: Nose normal. No congestion. Right Sinus: No maxillary sinus tenderness or frontal sinus tenderness. Left Sinus: No maxillary sinus tenderness or frontal sinus tenderness. Mouth/Throat:      Lips: Pink. No lesions. Mouth: Mucous membranes are moist. No oral lesions. Tongue: No lesions. Pharynx: Oropharynx is clear. No oropharyngeal exudate or posterior oropharyngeal erythema. Eyes:      Extraocular Movements: Extraocular movements intact. Conjunctiva/sclera: Conjunctivae normal.      Pupils: Pupils are equal, round, and reactive to light. Neck:      Thyroid: No thyroid mass. Cardiovascular:      Rate and Rhythm: Normal rate and regular rhythm. Pulses: Normal pulses.       Heart sounds: Normal heart sounds. No murmur heard. Pulmonary:      Effort: Pulmonary effort is normal. No respiratory distress. Breath sounds: Normal breath sounds and air entry. No wheezing, rhonchi or rales. Abdominal:      General: Bowel sounds are normal. There is no distension. Palpations: Abdomen is soft. Tenderness: There is no abdominal tenderness. Musculoskeletal:         General: Normal range of motion. Cervical back: Full passive range of motion without pain and normal range of motion. Right lower leg: No edema. Left lower leg: No edema. Lymphadenopathy:      Cervical: No cervical adenopathy. Skin:     General: Skin is warm and dry. Capillary Refill: Capillary refill takes less than 2 seconds. Findings: No rash. Neurological:      General: No focal deficit present. Mental Status: She is alert and oriented to person, place, and time. Deep Tendon Reflexes: Reflexes normal.   Psychiatric:         Attention and Perception: Attention and perception normal.         Mood and Affect: Mood and affect normal.         Speech: Speech normal.         Behavior: Behavior normal. Behavior is cooperative. Cognition and Memory: Cognition is impaired. She exhibits impaired recent memory. Judgment: Judgment is inappropriate. ASSESSMENT:   Diagnosis Orders   1. Alzheimer's dementia without behavioral disturbance, unspecified timing of [facility] onset [facility]  donepezil (ARICEPT) 5 MG tablet    CT HEAD WO CONTRAST     PLAN:  1. Alzheimer's dementia without behavioral disturbance, unspecified timing of dementia onset ([facility]  - MMSE done in the office was 19/30   - Will start low dose Aricept  - donepezil (ARICEPT) 5 MG tablet; Take 1 tablet by mouth nightly  Dispense: 90 tablet; Refill: 0  - CT HEAD WO CONTRAST; Future    - Rest of systems unchanged, continue current treatments.     - On this date July 15, 2021,  I have spent greater than 50% of this visit reviewing previous notes, test results and/or face to face with the patient discussing the diagnoses, importance of compliance with the treatment plan, counseling, coordinating care as well as documenting on the day of the visit.      CHARISSE Betts-CNP Opt out

## 2023-07-17 NOTE — ED PROVIDER NOTE - OBJECTIVE STATEMENT
Patient is a 69 yo male with PMHx of Epilepsy, HLD, HTN presenting with right ankle and foot pain s/p trip and fall x 8 days ago. Patient states he was walking to the bathroom and his flip flop got caught causing him to fall last Monday. Patient states he fell forward but landed on his back and was able to pick himself up afterwards. He states his back was hurting initially for the first few days but pain has subsided. However, patient states his right ankle has been increasing in pain since last week.  He admits to taking Tylenol for the pain with moderate relief. Patient states right ankle pain is 10/10 and non radiating. Patient admits to mild swelling of his right ankle and foot amd SOB on exertion. He denies hitting his head, losing consciousness or prodromal symptoms of lightheadedness, dizziness. Patient denies numbness, paraesthesias, headaches, vision changes, chest pain, changes in bowel or bladder habits, neck pain or back pain. Patient is a 67 yo male with PMHx of Epilepsy, HLD, HTN presenting with right ankle and foot pain s/p trip and fall x 8 days ago. Patient states he was walking to the bathroom and his flip flop got caught causing him to fall last Monday. Patient states he fell forward but landed on his back and was able to pick himself up afterwards. He states his back was hurting initially for the first few days but pain has subsided. However, patient states his right ankle has been increasing in pain since last week.  He admits to taking Tylenol for the pain with moderate relief. Patient states right ankle pain is 10/10 and non radiating. Patient admits to mild swelling of his right ankle and foot and SOB on exertion. He denies hitting his head, losing consciousness or prodromal symptoms of lightheadedness, dizziness. Patient denies numbness, paraesthesias, headaches, vision changes, chest pain, changes in bowel or bladder habits, neck pain or back pain. Patient is a 69 yo male with PMHx of Epilepsy, HLD, HTN presenting with right ankle and foot pain s/p trip and fall x 8 days ago. Patient states he was walking to the bathroom and his flip flop got caught causing him to fall last Monday. Patient states he fell forward but landed on his back and was able to pick himself up afterwards. He states his back was hurting initially for the first few days but pain has subsided. However, patient states his right ankle has been increasing in pain since last week.  He admits to taking Tylenol for the pain with moderate relief. Patient states right ankle pain is 10/10 and non radiating. Patient admits to mild swelling of his right ankle and foot. He denies hitting his head, losing consciousness or prodromal symptoms of lightheadedness, dizziness. Patient denies numbness, paraesthesias, headaches, vision changes, chest pain, SOB, changes in bowel or bladder habits, neck pain or back pain.

## 2023-07-17 NOTE — ED PROVIDER NOTE - NSFOLLOWUPCLINICS_GEN_ALL_ED_FT
[facility]  Podiatry  [address] [place] [address] - [address] [place] [address]  Phone: [phone]  Fax:   Follow Up Time: 1-3 Days

## 2023-07-17 NOTE — ED PROVIDER NOTE - ATTENDING APP SHARED VISIT CONTRIBUTION OF CARE
RGUJRAL 68 year-old right-handed male w/ PMHx epilepsy, developmental delay, AOx3, paranoid schizophrenia, HLD, HTN, h/o spinal surgery BIB his sister for fall 1 week ago. Patient states he tripped and fell backwards. Denies trauma to his head or loc. States he had back pain after fall that has since resolved. Pt complains of R foot pain that has not improved. Denies any neck/chest or abd pain. Pt lives alone, has been ambulating with a cane. On exam, Patient is awake, alert x 3.  GCS15. NCAT, PERRL.   Neck: No Posterior midline cervical spine tenderness. Full ROM and neuro intact.  Chest is clear to auscultation. +S1S2.  Abdomen is soft nondistended/nontender +BS. No rebound or guarding.   Pelvis is stable. Full ROM B/L hips.   Back non tender midline T/L spine.  R foot ecchymosis to diffuse foot and toes. + swelling to ankle and lower extremity. 2+ DP, nml sensation. Xray to eval for fracture pain control. RGUJRAL 69 yo male hx epilepsy, developmental delay, AOx3, paranoid schizophrenia, HLD, HTN, h/o spinal surgery BIB his sister for fall 1 week ago. Patient states he tripped and fell backwards. Denies trauma to his head or loc. States he had back pain after fall that has since resolved. Pt complains of R foot pain that has not improved. Denies any neck/chest or abd pain. Pt lives alone, has been ambulating with a cane. On exam, Patient is awake, alert x 3.  GCS15. NCAT, PERRL.   Neck: No Posterior midline cervical spine tenderness. Full ROM and neuro intact.  Chest is clear to auscultation. +S1S2.  Abdomen is soft nondistended/nontender +BS. No rebound or guarding.   Pelvis is stable. Full ROM B/L hips.   Back non tender midline T/L spine.  R foot ecchymosis to diffuse foot and toes. + swelling to ankle and lower extremity. 2+ DP, nml sensation. Xray to eval for fracture pain control.

## 2023-07-17 NOTE — ED PROVIDER NOTE - PHYSICAL EXAMINATION
MSK: patient able to range b/l lower and upper extremities. +ttp of right ankle/foot. swelling noted to ankle and dorsum of foot. strength was 5/5 to left lower and b/l upper extremities. strength 4.5/5 to right lower extremity.   skin: +ecchymosis noted to all 5 digits of right foot.

## 2023-07-17 NOTE — ED PROVIDER NOTE - PATIENT PORTAL LINK FT
You can access the FollowMyHealth Patient Portal offered by [facility] by registering at the following website: [email or web address]. By joining [facility]’s FollowMyHealth portal, you will also be able to view your health information using other applications (apps) compatible with our system.

## 2023-07-17 NOTE — ED PROVIDER NOTE - NS ED ATTENDING STATEMENT MOD
This was a shared visit with the AYALA. I reviewed and verified the documentation and independently performed the documented: I have seen and examined this patient and fully participated in the care of this patient as the teaching attending.  The service was shared with the AYALA.  I reviewed and verified the documentation and independently performed the documented: Attending with

## 2023-07-17 NOTE — ED PROVIDER NOTE - ATTENDING CONTRIBUTION TO CARE
RGUJRAL 69 yo male hx epilepsy, developmental delay, AOx3, paranoid schizophrenia, HLD, HTN, h/o spinal surgery BIB his sister for fall 1 week ago. Patient states he tripped and fell backwards. Denies trauma to his head or loc. States he had back pain after fall that has since resolved. Pt complains of R foot pain that has not improved. Denies any neck/chest or abd pain. Pt lives alone, has been ambulating with a cane. On exam, Patient is awake, alert x 3.  GCS15. NCAT, PERRL.   Neck: No Posterior midline cervical spine tenderness. Full ROM and neuro intact.  Chest is clear to auscultation. +S1S2.  Abdomen is soft nondistended/nontender +BS. No rebound or guarding.   Pelvis is stable. Full ROM B/L hips.   Back non tender midline T/L spine.  R foot ecchymosis to diffuse foot and toes. + swelling to ankle and lower extremity. 2+ DP, nml sensation. Xray to eval for fracture pain control. RGUJRAL 67 yo male hx epilepsy, developmental delay, AOx3, paranoid schizophrenia, HLD, HTN, h/o spinal surgery BIB his sister for fall 1 week ago. Patient states he tripped and fell backwards. Denies trauma to his head or loc. States he had back pain after fall that has since resolved. Pt complains of R foot pain that has not improved. Denies any neck/chest or abd pain. Pt lives alone, has been ambulating with a cane. On exam, Patient is awake, alert x 3.  GCS15. NCAT, PERRL.   Neck: No Posterior midline cervical spine tenderness. Full ROM and neuro intact.  Chest is clear to auscultation. +S1S2.  Abdomen is soft nondistended/nontender +BS. No rebound or guarding.   Pelvis is stable. Full ROM B/L hips.   Back non tender midline T/L spine.  R foot ecchymosis to diffuse foot and toes. + swelling to ankle and lower extremity. 2+ DP, nml sensation. Xray to eval for fracture pain control.

## 2023-07-17 NOTE — ED PROVIDER NOTE - NSFOLLOWUPINSTRUCTIONS_ED_ALL_ED_FT
1. It is important to follow up with your primary care doctor and podiatry in 1-2 days    2. bring a copy of all your results to your follow up appointments    3. you can take Tylenol as needed for pain. you can take 650mg of Tylenol every 6 hours as needed for pain. do not take more than 4000mg in a 24 hour period.     4. if your symptoms worsen, persist, or if any new symptoms develop, or if you experience any signs of distress, return to the ER right away.

## 2023-07-17 NOTE — ED ADULT NURSE NOTE - OBJECTIVE STATEMENT
Pt is a 68y M, AxO3, PMH HTN, epilepsy, presents to the ED with R foot and ankle pain s/p mechanical trip and fall 1 week ago. Pt states he tripped in bathroom, falling backwards. Denies head trauma and LOC. States he was ambulatory post fall. On assessment, swelling noted to foot and ankle, ecchymosis to toes and heel. Pt able to wiggle toes, pedal pulses present. Denies headahce, dizziness, N/V, vision changes. Well appearing, speaking full sentences, family at bedside, no acute distress.

## 2023-09-19 ENCOUNTER — APPOINTMENT (OUTPATIENT)
Age: 68
End: 2023-09-19

## 2023-10-02 NOTE — ED PROVIDER NOTE - PROGRESS NOTE DETAILS
Hemostasis: Electrocautery Pt ambulatory with cane, R foot supported with ace wrap with outpatient follow up discussed with patient and sister. [id number]

## 2023-10-05 ENCOUNTER — APPOINTMENT (OUTPATIENT)
Dept: PULMONOLOGY | Facility: CLINIC | Age: 68
End: 2023-10-05
Payer: MEDICARE

## 2023-10-05 VITALS
WEIGHT: 233.25 LBS | HEART RATE: 61 BPM | DIASTOLIC BLOOD PRESSURE: 77 MMHG | TEMPERATURE: 98 F | OXYGEN SATURATION: 95 % | RESPIRATION RATE: 16 BRPM | SYSTOLIC BLOOD PRESSURE: 131 MMHG | HEIGHT: 63 IN | BODY MASS INDEX: 41.33 KG/M2

## 2023-10-05 DIAGNOSIS — G47.33 OBSTRUCTIVE SLEEP APNEA (ADULT) (PEDIATRIC): ICD-10-CM

## 2023-10-05 PROCEDURE — 99214 OFFICE O/P EST MOD 30 MIN: CPT

## 2024-04-09 ENCOUNTER — APPOINTMENT (OUTPATIENT)
Dept: NUCLEAR MEDICINE | Facility: IMAGING CENTER | Age: 69
End: 2024-04-09
Payer: MEDICARE

## 2024-04-09 ENCOUNTER — OUTPATIENT (OUTPATIENT)
Dept: OUTPATIENT SERVICES | Facility: HOSPITAL | Age: 69
LOS: 1 days | End: 2024-04-09
Payer: MEDICARE

## 2024-04-09 DIAGNOSIS — Z98.890 OTHER SPECIFIED POSTPROCEDURAL STATES: Chronic | ICD-10-CM

## 2024-04-09 DIAGNOSIS — C62.12 MALIGNANT NEOPLASM OF DESCENDED LEFT TESTIS: ICD-10-CM

## 2024-04-09 PROCEDURE — 78815 PET IMAGE W/CT SKULL-THIGH: CPT | Mod: 26,PI,MH

## 2024-04-09 PROCEDURE — 78815 PET IMAGE W/CT SKULL-THIGH: CPT | Mod: MH

## 2024-04-09 PROCEDURE — A9552: CPT

## 2024-06-07 NOTE — ED ADULT NURSE NOTE - NS ED NURSE LEVEL OF CONSCIOUSNESS SPEECH
Patient just left ER and needs an appointment with Dr. Carr. She wants to know if she should come in today to talk about her high blood pressure. Please call patient.  
Speaking to pt per separate e-advise encounter  
Speaking Coherently

## 2024-06-11 ENCOUNTER — INPATIENT (INPATIENT)
Facility: HOSPITAL | Age: 69
LOS: 1 days | Discharge: HOME | DRG: 812 | End: 2024-06-13
Attending: STUDENT IN AN ORGANIZED HEALTH CARE EDUCATION/TRAINING PROGRAM | Admitting: STUDENT IN AN ORGANIZED HEALTH CARE EDUCATION/TRAINING PROGRAM
Payer: MEDICARE

## 2024-06-11 VITALS
RESPIRATION RATE: 18 BRPM | TEMPERATURE: 99 F | SYSTOLIC BLOOD PRESSURE: 112 MMHG | HEART RATE: 89 BPM | HEIGHT: 62 IN | WEIGHT: 199.96 LBS | OXYGEN SATURATION: 100 % | DIASTOLIC BLOOD PRESSURE: 69 MMHG

## 2024-06-11 DIAGNOSIS — Z98.890 OTHER SPECIFIED POSTPROCEDURAL STATES: Chronic | ICD-10-CM

## 2024-06-11 LAB
ALBUMIN SERPL ELPH-MCNC: 4.1 G/DL — SIGNIFICANT CHANGE UP (ref 3.3–5)
ALP SERPL-CCNC: 129 U/L — HIGH (ref 40–120)
ALT FLD-CCNC: 25 U/L — SIGNIFICANT CHANGE UP (ref 10–45)
ANION GAP SERPL CALC-SCNC: 12 MMOL/L — SIGNIFICANT CHANGE UP (ref 5–17)
APTT BLD: 29.5 SEC — SIGNIFICANT CHANGE UP (ref 24.5–35.6)
AST SERPL-CCNC: 16 U/L — SIGNIFICANT CHANGE UP (ref 10–40)
BASOPHILS # BLD AUTO: 0.06 K/UL — SIGNIFICANT CHANGE UP (ref 0–0.2)
BASOPHILS NFR BLD AUTO: 1.7 % — SIGNIFICANT CHANGE UP (ref 0–2)
BILIRUB SERPL-MCNC: 0.4 MG/DL — SIGNIFICANT CHANGE UP (ref 0.2–1.2)
BLD GP AB SCN SERPL QL: POSITIVE — SIGNIFICANT CHANGE UP
BUN SERPL-MCNC: 44 MG/DL — HIGH (ref 7–23)
CALCIUM SERPL-MCNC: 9.4 MG/DL — SIGNIFICANT CHANGE UP (ref 8.4–10.5)
CHLORIDE SERPL-SCNC: 106 MMOL/L — SIGNIFICANT CHANGE UP (ref 96–108)
CO2 SERPL-SCNC: 20 MMOL/L — LOW (ref 22–31)
CREAT SERPL-MCNC: 2.2 MG/DL — HIGH (ref 0.5–1.3)
EGFR: 32 ML/MIN/1.73M2 — LOW
EOSINOPHIL # BLD AUTO: 0.03 K/UL — SIGNIFICANT CHANGE UP (ref 0–0.5)
EOSINOPHIL NFR BLD AUTO: 0.9 % — SIGNIFICANT CHANGE UP (ref 0–6)
GLUCOSE SERPL-MCNC: 96 MG/DL — SIGNIFICANT CHANGE UP (ref 70–99)
HCT VFR BLD CALC: 19.3 % — CRITICAL LOW (ref 39–50)
HGB BLD-MCNC: 6.3 G/DL — CRITICAL LOW (ref 13–17)
INR BLD: 0.96 RATIO — SIGNIFICANT CHANGE UP (ref 0.85–1.18)
LYMPHOCYTES # BLD AUTO: 1.19 K/UL — SIGNIFICANT CHANGE UP (ref 1–3.3)
LYMPHOCYTES # BLD AUTO: 32.5 % — SIGNIFICANT CHANGE UP (ref 13–44)
MANUAL SMEAR VERIFICATION: SIGNIFICANT CHANGE UP
MCHC RBC-ENTMCNC: 31.8 PG — SIGNIFICANT CHANGE UP (ref 27–34)
MCHC RBC-ENTMCNC: 32.6 GM/DL — SIGNIFICANT CHANGE UP (ref 32–36)
MCV RBC AUTO: 97.5 FL — SIGNIFICANT CHANGE UP (ref 80–100)
MONOCYTES # BLD AUTO: 0.16 K/UL — SIGNIFICANT CHANGE UP (ref 0–0.9)
MONOCYTES NFR BLD AUTO: 4.4 % — SIGNIFICANT CHANGE UP (ref 2–14)
NEUTROPHILS # BLD AUTO: 2.21 K/UL — SIGNIFICANT CHANGE UP (ref 1.8–7.4)
NEUTROPHILS NFR BLD AUTO: 54.4 % — SIGNIFICANT CHANGE UP (ref 43–77)
NEUTS BAND # BLD: 6.1 % — SIGNIFICANT CHANGE UP (ref 0–8)
NRBC # BLD: 1 /100 WBCS — HIGH (ref 0–0)
PLAT MORPH BLD: NORMAL — SIGNIFICANT CHANGE UP
PLATELET # BLD AUTO: 28 K/UL — LOW (ref 150–400)
POTASSIUM SERPL-MCNC: 5.3 MMOL/L — SIGNIFICANT CHANGE UP (ref 3.5–5.3)
POTASSIUM SERPL-SCNC: 5.3 MMOL/L — SIGNIFICANT CHANGE UP (ref 3.5–5.3)
PROT SERPL-MCNC: 6.5 G/DL — SIGNIFICANT CHANGE UP (ref 6–8.3)
PROTHROM AB SERPL-ACNC: 10.6 SEC — SIGNIFICANT CHANGE UP (ref 9.5–13)
RBC # BLD: 1.98 M/UL — LOW (ref 4.2–5.8)
RBC # FLD: 16.1 % — HIGH (ref 10.3–14.5)
RBC BLD AUTO: SIGNIFICANT CHANGE UP
RH IG SCN BLD-IMP: POSITIVE — SIGNIFICANT CHANGE UP
RH IG SCN BLD-IMP: POSITIVE — SIGNIFICANT CHANGE UP
SODIUM SERPL-SCNC: 138 MMOL/L — SIGNIFICANT CHANGE UP (ref 135–145)
WBC # BLD: 3.65 K/UL — LOW (ref 3.8–10.5)
WBC # FLD AUTO: 3.65 K/UL — LOW (ref 3.8–10.5)

## 2024-06-11 PROCEDURE — 99285 EMERGENCY DEPT VISIT HI MDM: CPT

## 2024-06-11 RX ORDER — GABAPENTIN 400 MG/1
300 CAPSULE ORAL ONCE
Refills: 0 | Status: COMPLETED | OUTPATIENT
Start: 2024-06-11 | End: 2024-06-11

## 2024-06-11 RX ORDER — LAMOTRIGINE 25 MG/1
100 TABLET, ORALLY DISINTEGRATING ORAL ONCE
Refills: 0 | Status: COMPLETED | OUTPATIENT
Start: 2024-06-11 | End: 2024-06-11

## 2024-06-11 RX ORDER — LEVETIRACETAM 250 MG/1
1000 TABLET, FILM COATED ORAL ONCE
Refills: 0 | Status: COMPLETED | OUTPATIENT
Start: 2024-06-11 | End: 2024-06-11

## 2024-06-11 RX ORDER — LURASIDONE HYDROCHLORIDE 40 MG/1
40 TABLET ORAL DAILY
Refills: 0 | Status: DISCONTINUED | OUTPATIENT
Start: 2024-06-11 | End: 2024-06-13

## 2024-06-11 RX ADMIN — GABAPENTIN 300 MILLIGRAM(S): 400 CAPSULE ORAL at 21:10

## 2024-06-11 RX ADMIN — LEVETIRACETAM 1000 MILLIGRAM(S): 250 TABLET, FILM COATED ORAL at 20:43

## 2024-06-11 RX ADMIN — LAMOTRIGINE 100 MILLIGRAM(S): 25 TABLET, ORALLY DISINTEGRATING ORAL at 20:42

## 2024-06-11 RX ADMIN — LURASIDONE HYDROCHLORIDE 40 MILLIGRAM(S): 40 TABLET ORAL at 21:10

## 2024-06-11 NOTE — ED PROVIDER NOTE - PHYSICAL EXAMINATION
GENERAL: NAD, lying in bed comfortably. Appears pale  HEAD:  Atraumatic, Normocephalic  EYES: EOMI, conjunctival pallor  ENT: Moist mucous membranes  NECK: Supple  CHEST/LUNG: Unlabored respirations. Clear to auscultation bilaterally. No rales, rhonchi, wheezing, or rubs  HEART: Regular rate and rhythm. No murmurs, rubs, or gallops  ABDOMEN: Soft, nondistended, nontender  EXTREMITIES:  No cyanosis or edema.   NERVOUS SYSTEM:  No focal deficits. A&Ox3  SKIN: No rashes or lesions

## 2024-06-11 NOTE — ED ADULT NURSE NOTE - OBJECTIVE STATEMENT
patient is a 69 y/o M with hx of testicular CA on chemo, VAZQUEZ, paranoid schizophrenia, epilepsy who presents to the ED with low HGB. patient states that he is between rounds of chemo and went for basic blood work and HGB was 6.4. patient endorsing worsened fatigue over the past few days, otherwise feels at his baseline. patient is a&ox4, PERRL. respirations even and unlabored. abdomen soft, nondistended. skin intact. denies fevers/chills, numbness/tingling, headache, dizziness, vision changes, cp, sob, cough, abd pain, n/v/d, dysuria, hematuria, bloody stools, back pain. MD Connelly at bedside to assess. updated on plan of care. comfort and safety maintained

## 2024-06-11 NOTE — ED PROVIDER NOTE - TEMPLATE, MLM
DATE:  11/01/2017    CHIEF COMPLAINT:  1. Diarrhea.  2. Shortness of breath.    HISTORY OF PRESENT ILLNESS:  The patient is a pleasant 47-year-old   American female.  She comes to the hospital as a direct admit [facility].  The history I am dictating is from my conversation with the patient.  Old records were reviewed in detail including, but not limited to a discharge  summary by Dr. Fadi Driscoll when the patient was admitted from 12/14/2016 to  12/16/2016 with acute on chronic heart failure.    According to the patient, she changed her insurance a month back or so.  She  said that she ran out of medication and it took her some time to get all her  medications back.  She said that she was able to take all the medication through  her insurance other than spironolactone.  She says that she missed about 2 weeks  of medications on and off including heart medications.  She went to the [facility] for appointment yesterday.  She was evaluated over there.  Over  there, the patient complains of facial swelling, shortness of breath.  She also  complained of diarrhea.  She was sent to the hospital for admission.    According to the patient, she has been having diarrhea on and off for 2 weeks.  She describes the diarrhea as anything that she eats comes out.  She has not  noticed any blood or mucus.  She said that she has abdominal cramps during the  diarrhea, but otherwise she does not have any abdominal pain.  She denies any  chest pain.    The patient denies any fever or chills.    No chest pain or pressure is reported, but the patient does say that she felt  short of breath on and off on exertion.  She also said that she has developed  some facial swelling.    The patient denies any specific weakness of any of the extremities, but says  that she has generalized tiredness.  She denies any falls.  Denies any burning,  urgency, or frequency of urine.    The patient was seen with the [facility].  Her potassium was noted to  be 2.9 and she was sent to the hospital for direct admit.    PAST MEDICAL HISTORY:  Includes:  1. Congestive heart failure, systolic dysfunction.  Her last echocardiogram  showed EF of 25-30%.  2. Asthma/COPD.  She is on home nebulizer.  3. Status post defibrillator placed in the past.  4. HIV on HAART treatment.  5. Hypertension.  6. Lymphoma involving the lungs.  7. History of multiple episodes of chest pain in the past.    PAST SURGICAL HISTORY:  Includes multiple cardiac catheterizations in the past.    ALLERGIES:  ENALAPRIL and IMDUR.    CURRENT MEDICATIONS:  See the list.    PERSONAL/SOCIAL HISTORY:  The patient works as a .  She lives with a  friend.  She is independent in activities of daily living.  She smoked about 10  years, half pack per day, but quit smoking 10 years.  No history of alcohol  abuse.  No history of recreational drug use.    FAMILY HISTORY:  Mother had congestive heart failure and hypertension.  Brother  had epilepsy.  Father had congestive heart failure.    REVIEW OF SYSTEMS:  I reviewed all systems in detail other than what is  mentioned over in history and physical.  All the review of the systems were  negative.    PHYSICAL EXAMINATION:  GENERAL  I examined the patient in the hospital bed.  She was lying in the bed.  She was  alert and oriented x3.  She has some facial swelling, but nothing really stands  out.  No redness is noted on the face.    HEENT  Normocephalic, atraumatic.  Pupils are equal, round, and reactive to light.  Extraocular muscles intact.  Oral mucosa is moist.  No thrush or ulcer noted.    NECK  Supple.  No JVD.  Thyroid is not enlarged.    CHEST  Clear to auscultation bilaterally.  Few fine crackles at both bases.  She is not  using accessory muscles for respiration.    HEART  S1 and S2.  Soft systolic murmur is heard.    ABDOMEN  Soft, nontender, and nondistended.  Positive bowel sounds.  No rebound  tenderness.   No organomegaly.    EXTREMITIES  No pedal edema.    NEUROLOGICAL  Alert and oriented x3.  Cranial nerves are intact.  Power 5 in all extremities.    BACK  No CVA or spine tenderness.    DIAGNOSTIC DATA:  Sodium 133, potassium 3.2, chloride 99, CO2 29, BUN is 18,  creatinine 0.65, glucose is 105.  Magnesium is 1.9, yesterday it was 1.6.    White count is 5.9, hemoglobin is 11.8, hematocrit is 36.0, and platelet count  is 274.    Stool for C diff was negative.    IMPRESSION:  1. Congestive heart failure, currently seems to be stable.  2. Asthma/chronic obstructive pulmonary disease, stable.  3. Hypokalemia.  4. Human immunodeficiency virus, on HAART treatment.  5. Acute exacerbation of congestive heart failure, acute on chronic systolic  dysfunction.  6. Medically significant obesity.    ASSESSMENT AND PLAN:  1. Acute exacerbation of congestive heart failure, acute on chronic systolic  dysfunction.  The patient will be admitted to the hospital.  IV Lasix will  be started.  Her echocardiogram will be obtained.  Her last echocardiogram  was more than a year back.  Heart Failure team has been consulted and await  for further recommendations.  2. Hypokalemia and hypomagnesemia.  Her magnesium is near corrected.  Nonetheless, I will give her 2 g of magnesium rider more.  Also, give her  40 of potassium chloride p.o. x3 today.  Repeat labs in the morning.  3. Asthma/COPD, stable at this time.  We will continue on nebulizer treatment.  4. Clostridium difficile colitis.  This is the first episode, started on oral  Flagyl.  Infectious Disease consultation.  5. Deep vein thrombosis prophylaxis.  Heparin.  6. Medically significant obesity.  She would benefit from weight loss.  7. Discussed with the patient plan regarding compliance.  She said that she is  very compliant with the medication and just that she ran out of medication  because of the change in insurance.  I would continue the patient closely.  8. Care plan discussed  with RN.        DATE AND TIME                       MD KESHA Mejía/GISELLE-#[id number]  DD:  11/02/2017 10:11:05      DT:  11/02/2017 10:49:04        [facility]    HISTORY AND PHYSICAL       SARATH CHARLES  MRN#: [id number]  ROOM: [address]  ACCT#: [id number] and Physical         Electronically Signed On 11/06/2017 20:11  __________________________________________________   AYALA MENDENHALL     VIEW ALL

## 2024-06-11 NOTE — ED ADULT NURSE REASSESSMENT NOTE - NS ED NURSE REASSESS COMMENT FT1
Pt pending PRBCs. Pt has antibodies per blood bank. RN spoke to blood bank at 2315 regarding when PRBCs can be released. Blood bank tech stated it will take approx 1.5 hrs more for tests to be completed and blood can be released.

## 2024-06-11 NOTE — ED PROVIDER NOTE - ATTENDING CONTRIBUTION TO CARE
Obie Connelly DO: I have personally performed a face to face medical and diagnostic evaluation of the patient. I have discussed with and reviewed the Resident's and/or ACP's and/or Medical/PA/NP student's note and agree with the History, ROS, Physical Exam and MDM unless otherwise indicated. A brief summary of my personal evaluation and impression can be found below.     68-year-old male with past medical history of epilepsy, hyperlipidemia, hypertension, schizophrenia, VAZQUEZ, testicular cancer status post left orchiectomy in March 2024, presenting with low hemoglobin to 6.4.  Patient has had 3 cycles of chemotherapy etoposide + cysplatin.  Had outpatient blood work done after developing fatigue and lightheadedness and shortness of breath.  Was found to have hemoglobin 6.4.  Was sent in for blood transfusion.  Denies any chest pain.  Denies any bleeding.  No bloody stool, hematuria, black stool, epistaxis.    CONSTITUTIONAL: NAD  SKIN: Warm dry, normal skin turgor, pale appearing  HEAD: NCAT  EYES: EOMI, PERRLA, no scleral icterus, conjunctiva pink  NECK: Supple; non tender. Full ROM.  CARD: RRR  RESP: No respiratory distress  ABD: non-distended  MSK: Full ROM, no leg swelling  PSYCH: Cooperative, appropriate.    Suspect anemia 2/2 chemotherapy, will obtain labs including cbc Type and screen and will give blood as needed, no clinical concern for tumor lysis syndrome at this time as patient has no other complaints.  Patient denies dark stool or any hematuria hemoptysis or other source of blood loss concerning for hemorrhagic cause of anemia.  Given outpatient hemoglobin of 6 anticipate admission for blood transfusion.

## 2024-06-11 NOTE — ED ADULT NURSE NOTE - NSFALLHARMRISKINTERV_ED_ALL_ED

## 2024-06-11 NOTE — ED PROVIDER NOTE - CLINICAL SUMMARY MEDICAL DECISION MAKING FREE TEXT BOX
68-year-old male with past medical history of epilepsy, hyperlipidemia, hypertension, schizophrenia, VAZQUEZ, testicular cancer status post left orchiectomy in March 2024, presenting with low hemoglobin to 6.4.      VS WNL.     GENERAL: NAD, lying in bed comfortably. Appears pale  HEAD:  Atraumatic, Normocephalic  EYES: EOMI, conjunctival pallor  ENT: Moist mucous membranes  NECK: Supple  CHEST/LUNG: Unlabored respirations. Clear to auscultation bilaterally. No rales, rhonchi, wheezing, or rubs  HEART: Regular rate and rhythm. No murmurs, rubs, or gallops  ABDOMEN: Soft, nondistended, nontender  EXTREMITIES:  No cyanosis or edema.   NERVOUS SYSTEM:  No focal deficits. A&Ox3  SKIN: No rashes or lesions    Anemia most likely 2/2 chemotherapy. Will assess for coagulopathy

## 2024-06-11 NOTE — ED CLERICAL - NS ED CLERK NOTE PRE-ARRIVAL INFORMATION; ADDITIONAL PRE-ARRIVAL INFORMATION
CC/Reason For referral: hemoglobin of 6.4, needs blood transfusion  Preferred Consultant(if applicable):  Who admits for you (if needed):  Do you have documents you would like to fax over? no  Would you still like to speak to an ED attending? no

## 2024-06-11 NOTE — ED PROVIDER NOTE - OBJECTIVE STATEMENT
68-year-old male with past medical history of epilepsy, hyperlipidemia, hypertension, schizophrenia, VAZQUEZ, testicular cancer status post left orchiectomy in March 2024, presenting with low hemoglobin to 6.4.  Patient has had 3 cycles of chemotherapy.  Had outpatient blood work done after developing fatigue and lightheadedness and shortness of breath.  Was found to have hemoglobin 6.4.  Was sent in for blood transfusion.  Denies any chest pain.  Denies any bleeding.  No bloody stool, hematuria, black stool, epistaxis.

## 2024-06-11 NOTE — ED ADULT NURSE REASSESSMENT NOTE - NS ED NURSE REASSESS COMMENT FT1
per blood bank patient with antibodies found in blood, states that PRBCs wont be ready for "at least an hour". MD Castellanos aware per blood bank patient with antibodies found in blood, states that PRBCs wont be ready for "at least an hour". MD Castellanos aware. per MD Castellanos emergent release blood not required at this time

## 2024-06-12 DIAGNOSIS — E78.5 HYPERLIPIDEMIA, UNSPECIFIED: ICD-10-CM

## 2024-06-12 DIAGNOSIS — Z29.9 ENCOUNTER FOR PROPHYLACTIC MEASURES, UNSPECIFIED: ICD-10-CM

## 2024-06-12 DIAGNOSIS — D64.81 ANEMIA DUE TO ANTINEOPLASTIC CHEMOTHERAPY: ICD-10-CM

## 2024-06-12 DIAGNOSIS — G40.909 EPILEPSY, UNSPECIFIED, NOT INTRACTABLE, WITHOUT STATUS EPILEPTICUS: ICD-10-CM

## 2024-06-12 DIAGNOSIS — D61.810 ANTINEOPLASTIC CHEMOTHERAPY INDUCED PANCYTOPENIA: ICD-10-CM

## 2024-06-12 DIAGNOSIS — D64.9 ANEMIA, UNSPECIFIED: ICD-10-CM

## 2024-06-12 DIAGNOSIS — I10 ESSENTIAL (PRIMARY) HYPERTENSION: ICD-10-CM

## 2024-06-12 DIAGNOSIS — C62.90 MALIGNANT NEOPLASM OF UNSPECIFIED TESTIS, UNSPECIFIED WHETHER DESCENDED OR UNDESCENDED: ICD-10-CM

## 2024-06-12 DIAGNOSIS — F20.9 SCHIZOPHRENIA, UNSPECIFIED: ICD-10-CM

## 2024-06-12 DIAGNOSIS — N17.9 ACUTE KIDNEY FAILURE, UNSPECIFIED: ICD-10-CM

## 2024-06-12 LAB
ALBUMIN SERPL ELPH-MCNC: 4 G/DL — SIGNIFICANT CHANGE UP (ref 3.3–5)
ALP SERPL-CCNC: 130 U/L — HIGH (ref 40–120)
ALT FLD-CCNC: 26 U/L — SIGNIFICANT CHANGE UP (ref 10–45)
ANION GAP SERPL CALC-SCNC: 14 MMOL/L — SIGNIFICANT CHANGE UP (ref 5–17)
AST SERPL-CCNC: 18 U/L — SIGNIFICANT CHANGE UP (ref 10–40)
BILIRUB SERPL-MCNC: 0.6 MG/DL — SIGNIFICANT CHANGE UP (ref 0.2–1.2)
BUN SERPL-MCNC: 43 MG/DL — HIGH (ref 7–23)
CALCIUM SERPL-MCNC: 9.4 MG/DL — SIGNIFICANT CHANGE UP (ref 8.4–10.5)
CHLORIDE SERPL-SCNC: 109 MMOL/L — HIGH (ref 96–108)
CO2 SERPL-SCNC: 18 MMOL/L — LOW (ref 22–31)
CREAT SERPL-MCNC: 2.11 MG/DL — HIGH (ref 0.5–1.3)
EGFR: 34 ML/MIN/1.73M2 — LOW
GLUCOSE SERPL-MCNC: 119 MG/DL — HIGH (ref 70–99)
HCT VFR BLD CALC: 22 % — LOW (ref 39–50)
HCT VFR BLD CALC: 23 % — LOW (ref 39–50)
HGB BLD-MCNC: 7.1 G/DL — LOW (ref 13–17)
HGB BLD-MCNC: 7.6 G/DL — LOW (ref 13–17)
MCHC RBC-ENTMCNC: 31.4 PG — SIGNIFICANT CHANGE UP (ref 27–34)
MCHC RBC-ENTMCNC: 31.5 PG — SIGNIFICANT CHANGE UP (ref 27–34)
MCHC RBC-ENTMCNC: 32.3 GM/DL — SIGNIFICANT CHANGE UP (ref 32–36)
MCHC RBC-ENTMCNC: 33 GM/DL — SIGNIFICANT CHANGE UP (ref 32–36)
MCV RBC AUTO: 95.4 FL — SIGNIFICANT CHANGE UP (ref 80–100)
MCV RBC AUTO: 97.3 FL — SIGNIFICANT CHANGE UP (ref 80–100)
NRBC # BLD: 0 /100 WBCS — SIGNIFICANT CHANGE UP (ref 0–0)
NRBC # BLD: 0 /100 WBCS — SIGNIFICANT CHANGE UP (ref 0–0)
PLATELET # BLD AUTO: 30 K/UL — LOW (ref 150–400)
PLATELET # BLD AUTO: 39 K/UL — LOW (ref 150–400)
POTASSIUM SERPL-MCNC: 4.9 MMOL/L — SIGNIFICANT CHANGE UP (ref 3.5–5.3)
POTASSIUM SERPL-SCNC: 4.9 MMOL/L — SIGNIFICANT CHANGE UP (ref 3.5–5.3)
PROT SERPL-MCNC: 6.3 G/DL — SIGNIFICANT CHANGE UP (ref 6–8.3)
RBC # BLD: 2.26 M/UL — LOW (ref 4.2–5.8)
RBC # BLD: 2.41 M/UL — LOW (ref 4.2–5.8)
RBC # FLD: 15.9 % — HIGH (ref 10.3–14.5)
RBC # FLD: 16.3 % — HIGH (ref 10.3–14.5)
SODIUM SERPL-SCNC: 141 MMOL/L — SIGNIFICANT CHANGE UP (ref 135–145)
WBC # BLD: 3.44 K/UL — LOW (ref 3.8–10.5)
WBC # BLD: 4.15 K/UL — SIGNIFICANT CHANGE UP (ref 3.8–10.5)
WBC # FLD AUTO: 3.44 K/UL — LOW (ref 3.8–10.5)
WBC # FLD AUTO: 4.15 K/UL — SIGNIFICANT CHANGE UP (ref 3.8–10.5)

## 2024-06-12 PROCEDURE — 86077 PHYS BLOOD BANK SERV XMATCH: CPT

## 2024-06-12 PROCEDURE — 99223 1ST HOSP IP/OBS HIGH 75: CPT

## 2024-06-12 RX ORDER — LEVETIRACETAM 250 MG/1
1000 TABLET, FILM COATED ORAL
Refills: 0 | Status: DISCONTINUED | OUTPATIENT
Start: 2024-06-12 | End: 2024-06-13

## 2024-06-12 RX ORDER — ATORVASTATIN CALCIUM 80 MG/1
1 TABLET, FILM COATED ORAL
Qty: 0 | Refills: 0 | DISCHARGE

## 2024-06-12 RX ORDER — ATORVASTATIN CALCIUM 80 MG/1
20 TABLET, FILM COATED ORAL AT BEDTIME
Refills: 0 | Status: DISCONTINUED | OUTPATIENT
Start: 2024-06-12 | End: 2024-06-13

## 2024-06-12 RX ORDER — EMPAGLIFLOZIN 10 MG/1
1 TABLET, FILM COATED ORAL
Refills: 0 | DISCHARGE

## 2024-06-12 RX ORDER — LANOLIN ALCOHOL/MO/W.PET/CERES
3 CREAM (GRAM) TOPICAL AT BEDTIME
Refills: 0 | Status: DISCONTINUED | OUTPATIENT
Start: 2024-06-12 | End: 2024-06-13

## 2024-06-12 RX ORDER — LEVETIRACETAM 250 MG/1
1 TABLET, FILM COATED ORAL
Qty: 0 | Refills: 0 | DISCHARGE

## 2024-06-12 RX ORDER — LAMOTRIGINE 25 MG/1
100 TABLET, ORALLY DISINTEGRATING ORAL
Refills: 0 | Status: DISCONTINUED | OUTPATIENT
Start: 2024-06-12 | End: 2024-06-13

## 2024-06-12 RX ORDER — GABAPENTIN 400 MG/1
300 CAPSULE ORAL
Refills: 0 | Status: DISCONTINUED | OUTPATIENT
Start: 2024-06-12 | End: 2024-06-13

## 2024-06-12 RX ORDER — LURASIDONE HYDROCHLORIDE 40 MG/1
1 TABLET ORAL
Qty: 0 | Refills: 0 | DISCHARGE

## 2024-06-12 RX ORDER — ACETAMINOPHEN 500 MG
650 TABLET ORAL EVERY 6 HOURS
Refills: 0 | Status: DISCONTINUED | OUTPATIENT
Start: 2024-06-12 | End: 2024-06-13

## 2024-06-12 RX ORDER — ESCITALOPRAM OXALATE 10 MG/1
15 TABLET, FILM COATED ORAL
Qty: 0 | Refills: 0 | DISCHARGE

## 2024-06-12 RX ORDER — LORATADINE 10 MG/1
10 TABLET ORAL DAILY
Refills: 0 | Status: DISCONTINUED | OUTPATIENT
Start: 2024-06-12 | End: 2024-06-13

## 2024-06-12 RX ORDER — AMLODIPINE BESYLATE 2.5 MG/1
5 TABLET ORAL DAILY
Refills: 0 | Status: DISCONTINUED | OUTPATIENT
Start: 2024-06-12 | End: 2024-06-13

## 2024-06-12 RX ORDER — GABAPENTIN 400 MG/1
1 CAPSULE ORAL
Qty: 0 | Refills: 0 | DISCHARGE

## 2024-06-12 RX ORDER — ESCITALOPRAM OXALATE 10 MG/1
15 TABLET, FILM COATED ORAL DAILY
Refills: 0 | Status: DISCONTINUED | OUTPATIENT
Start: 2024-06-12 | End: 2024-06-13

## 2024-06-12 RX ADMIN — ATORVASTATIN CALCIUM 20 MILLIGRAM(S): 80 TABLET, FILM COATED ORAL at 21:01

## 2024-06-12 RX ADMIN — LURASIDONE HYDROCHLORIDE 40 MILLIGRAM(S): 40 TABLET ORAL at 18:27

## 2024-06-12 RX ADMIN — Medication 650 MILLIGRAM(S): at 01:50

## 2024-06-12 RX ADMIN — GABAPENTIN 300 MILLIGRAM(S): 400 CAPSULE ORAL at 21:01

## 2024-06-12 RX ADMIN — LAMOTRIGINE 100 MILLIGRAM(S): 25 TABLET, ORALLY DISINTEGRATING ORAL at 07:49

## 2024-06-12 RX ADMIN — LAMOTRIGINE 100 MILLIGRAM(S): 25 TABLET, ORALLY DISINTEGRATING ORAL at 21:01

## 2024-06-12 RX ADMIN — LEVETIRACETAM 1000 MILLIGRAM(S): 250 TABLET, FILM COATED ORAL at 07:49

## 2024-06-12 RX ADMIN — LORATADINE 10 MILLIGRAM(S): 10 TABLET ORAL at 21:01

## 2024-06-12 RX ADMIN — Medication 3 MILLIGRAM(S): at 21:01

## 2024-06-12 RX ADMIN — AMLODIPINE BESYLATE 5 MILLIGRAM(S): 2.5 TABLET ORAL at 05:01

## 2024-06-12 RX ADMIN — LEVETIRACETAM 1000 MILLIGRAM(S): 250 TABLET, FILM COATED ORAL at 21:01

## 2024-06-12 RX ADMIN — GABAPENTIN 300 MILLIGRAM(S): 400 CAPSULE ORAL at 07:49

## 2024-06-12 RX ADMIN — ESCITALOPRAM OXALATE 15 MILLIGRAM(S): 10 TABLET, FILM COATED ORAL at 12:13

## 2024-06-12 NOTE — CONSULT NOTE ADULT - ASSESSMENT
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 He has been feeling fatigued and short of breath. He had his routine lab draw appointment today. His labs resulted with a hemoglobin of 6.4, so he was told to go to the ED to get a transfusion, was also given a shot of procrit. He has been on etoposide/cisplatin, has finished 3 cycles, C3 finished on 6/4. Denies any melena, BRBPR, coffee-ground vomit/hematemesis, hematuria, or any other obvious signs of bleeding.     In the ED, he was afebrile and hemodynamically stable, saturating well on RA. CBC w/ pancytopenia (WBC 3.65, Hb 6.3, plts 28). CMP showing evidence of SHAGUFTA on CKD3 w/ BUN/Cr 44/2.2 (baseline 1.6-1.7). Was ordered for 1u pRBCs in the ED.  (12 Jun 2024 01:06)  given shagufta on ckd      1- CKD III with progression of disease vs likely pre renal azotemia   2- anemia   3- HTN       ckd with progression and pre renal azotemia   IVF hydration   prbc for anemia keep Hb >  8   norvasc  to cont       
Mr. Gr is a 68 year old male with PMHx of seizure disorder, sleep apnea, HTN, chronic idiopathic constipation, stage III CKD, severe obesity, secondary hyperparathyroidism, anemia, thrombocytopenia, hyperlipidemia, testicular cancer under treatment with Dr. Ovalles who is admitted with symptomatic anemia.     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  Margins were negative.  pT3Nx. CT C/A/P with few left para-aortic and left iliac chain lymph nodes largest measuring 8.1 cm left para-aortic node, bilateral subcentimeter noncalcified pulmonary nodules measuring up to 7 mm. AFP, LDH, hCG were all elevated. 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 Started on adjuvant therapy with Cisplatin+Etoposide, so far completed 3 cycles of treatment with cisplatin dose reduced 50% and Etoposide 50% due to thrombocytopenia.         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 Gr, please do not hesitate to call or text me if you have further questions or concerns.     Brayan Mart MD  [facility] [place]   Division of Hematology/Oncology  [address] [place] [address], [address]  [place] [address]  P: [phone]  F: [phone]    Attestation:   Total time spent on the encounter: >60 minutes   ----Including  face-to-face interaction in addition to chart review, reviewing treatment plan, and managing the patient’s chronic diagnoses as listed in the assessment----     1.	I have reviewed, analyzed and interpreted the following labs: CBC, CMP, Coags, Ab imaging:  CT C/A/P outpatient impressions as above.   2.	I have reviewed notes stating pts current admission, consultants and follow ups.   3.	I have reviewed the past medical, family, and surgical history and confirmed with outpatient records   
step to step

## 2024-06-12 NOTE — CONSULT NOTE ADULT - SUBJECTIVE AND OBJECTIVE BOX
[facility]AT[facility]/ON[facility]Y CONSULT NOTE     HPI:  Mr. Gr is a 68y Male with PMHx of     ROS: pertinent positives and negatives as per HPI    Allergies: penicillin (Hives)    PMHx:  Hypertension, unspecified type  Other hyperlipidemia  History of epilepsy  Paranoid schizophrenia  Obstructive sleep apnea    SurgHx:   H/O Spinal surgery    FHx:     SocHx:     Meds:   MEDICATIONS  (STANDING):  amLODIPine   Tablet 5 milliGRAM(s) Oral daily  atorvastatin 20 milliGRAM(s) Oral at bedtime  escitalopram 15 milliGRAM(s) Oral daily  gabapentin 300 milliGRAM(s) Oral two times a day  lamoTRIgine 100 milliGRAM(s) Oral two times a day  levETIRAcetam 1000 milliGRAM(s) Oral two times a day  lurasidone 40 milliGRAM(s) Oral daily    MEDICATIONS  (PRN):  acetaminophen     Tablet .. 650 milliGRAM(s) Oral every 6 hours PRN Temp greater or equal to 38C (100.4F), Mild Pain (1 - 3)  melatonin 3 milliGRAM(s) Oral at bedtime PRN Insomnia    Vital Signs  T(C): 36.8 (06-12-24 @ 09:01), Max: 37.1 (06-12-24 @ 02:46)  T(F): 98.2 (06-12-24 @ 09:01), Max: 98.8 (06-12-24 @ 02:46)  HR: 74 (06-12-24 @ 09:01) (68 - 89)  BP: 110/63 (06-12-24 @ 09:01) (110/63 - 147/75)  RR: 18 (06-12-24 @ 09:01) (16 - 18)  SpO2: 97% (06-12-24 @ 09:01) (97% - 100%)    Physical Exam:  Gen:   HEENT:   Chest:   Cardiac:  Abd:   Ext:   Neuro:   Integument:     Labs:  CBC Full  -  ( 12 Jun 2024 08:10 )  WBC Count : 3.44 K/uL  RBC Count : 2.26 M/uL  Hemoglobin : 7.1 g/dL  Hematocrit : 22.0 %  Platelet Count - Automated : 30 K/uL  Mean Cell Volume : 97.3 fl  Mean Cell Hemoglobin : 31.4 pg  Mean Cell Hemoglobin Concentration : 32.3 gm/dL    06-12    141  |  109<H>  |  43<H>  ----------------------------<  119<H>  4.9   |  18<L>  |  2.11<H>    Ca    9.4      12 Jun 2024 08:10    TPro  6.3  /  Alb  4.0  /  TBili  0.6  /  DBili  x   /  AST  18  /  ALT  26  /  AlkPhos  130<H>  06-12    PT/INR - ( 11 Jun 2024 20:43 )   PT: 10.6 sec;   INR: 0.96 ratio         PTT - ( 11 Jun 2024 20:43 )  PTT:29.5 sec  Bilirubin Total: 0.6 mg/dL (06-12-24 @ 08:10)  Bilirubin Total: 0.4 mg/dL (06-11-24 @ 20:43)   [facility] HEMAT[facility]/ONCOLOGY CONSULT NOTE     HPI:  Mr. Gr is a 68 year old male with PMHx of seizure disorder, sleep apnea, HTN, chronic idiopathic constipation, stage III CKD, severe obesity, secondary hyperparathyroidism, anemia, thrombocytopenia, hyperlipidemia, testicular cancer under treatment with Dr. Ovalles who is admitted with symptomatic anemia.     Former smoker, quit 14y prior, denied ETOH, drug use. Lives at home. Does not have children. Denies family history of blood disorders or malignancy.  Denies previous workplace related exposures.  Denies previous history of blood transfusions.  Denied history of thromboses.  Denied history of  requiring anticoagulation.       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  Margins were negative.  pT3Nx. CT C/A/P with few left para-aortic and left iliac chain lymph nodes largest measuring 8.1 cm left para-aortic node, bilateral subcentimeter noncalcified pulmonary nodules measuring up to 7 mm. AFP, LDH, hCG were all elevated. 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 Started on adjuvant therapy with Cisplatin+Etoposide, so far completed 3 cycles of treatment with cisplatin dose reduced 50% and Etoposide 50% due to thrombocytopenia.             ROS: pertinent positives and negatives as per HPI    Allergies: penicillin (Hives)    PMHx:  Hypertension, unspecified type  Other hyperlipidemia  History of epilepsy  Paranoid schizophrenia  Obstructive sleep apnea    SurgHx:   H/O Spinal surgery    FHx:   Denied    SocHx:   Former smoker  Denies ETOH  Denied drug use  Lives at home    Meds:   MEDICATIONS  (STANDING):  amLODIPine   Tablet 5 milliGRAM(s) Oral daily  atorvastatin 20 milliGRAM(s) Oral at bedtime  escitalopram 15 milliGRAM(s) Oral daily  gabapentin 300 milliGRAM(s) Oral two times a day  lamoTRIgine 100 milliGRAM(s) Oral two times a day  levETIRAcetam 1000 milliGRAM(s) Oral two times a day  lurasidone 40 milliGRAM(s) Oral daily    MEDICATIONS  (PRN):  acetaminophen     Tablet .. 650 milliGRAM(s) Oral every 6 hours PRN Temp greater or equal to 38C (100.4F), Mild Pain (1 - 3)  melatonin 3 milliGRAM(s) Oral at bedtime PRN Insomnia    Vital Signs  T(C): 36.8 (06-12-24 @ 09:01), Max: 37.1 (06-12-24 @ 02:46)  T(F): 98.2 (06-12-24 @ 09:01), Max: 98.8 (06-12-24 @ 02:46)  HR: 74 (06-12-24 @ 09:01) (68 - 89)  BP: 110/63 (06-12-24 @ 09:01) (110/63 - 147/75)  RR: 18 (06-12-24 @ 09:01) (16 - 18)  SpO2: 97% (06-12-24 @ 09:01) (97% - 100%)    Physical Exam:  Gen: NAD  HEENT: EOMI, MMM  Chest:  speaking full sentences, equal chest rise  Cardiac: RR  Abd:  nondistended  Ext:  1+ edema bilaterally  Neuro: AAOX3, normal mood and affect      Labs:  CBC Full  -  ( 12 Jun 2024 08:10 )  WBC Count : 3.44 K/uL  RBC Count : 2.26 M/uL  Hemoglobin : 7.1 g/dL  Hematocrit : 22.0 %  Platelet Count - Automated : 30 K/uL  Mean Cell Volume : 97.3 fl  Mean Cell Hemoglobin : 31.4 pg  Mean Cell Hemoglobin Concentration : 32.3 gm/dL    06-12    141  |  109<H>  |  43<H>  ----------------------------<  119<H>  4.9   |  18<L>  |  2.11<H>    Ca    9.4      12 Jun 2024 08:10    TPro  6.3  /  Alb  4.0  /  TBili  0.6  /  DBili  x   /  AST  18  /  ALT  26  /  AlkPhos  130<H>  06-12    PT/INR - ( 11 Jun 2024 20:43 )   PT: 10.6 sec;   INR: 0.96 ratio         PTT - ( 11 Jun 2024 20:43 )  PTT:29.5 sec  Bilirubin Total: 0.6 mg/dL (06-12-24 @ 08:10)  Bilirubin Total: 0.4 mg/dL (06-11-24 @ 20:43)

## 2024-06-12 NOTE — PROGRESS NOTE ADULT - ASSESSMENT
Patient is a 67 year old male with PMHx of Metastatic Testicular Cancer on etoposide/cisplatin s/p L orchiectomy, Epilepsy, Schizophrenia, HTN, HLD and developmental delay. Presents to [facility] for blood transfusion after being found to be anemic to 6.4 on outpatient labs.    Plan:    # Anemia Likely 2/2 Chemo:  - No signs of bleeding noted, given all cell lines down on CBC, likely to be 2/2 chemotherapy-induced bone marrow suppression  - H/H 6.3/19.3  - WBC 3.65  - Platelets 28 - transfuse for <50k w/ bleed, <20k w/ fever and <10k  - sp 1 unit PRBC  - Monitor CBC  - Maintain active T+S  - Transfuse PRN  - Heme consult pending (called)    # SHAGUFTA on CKD3:  - Baseline 1.6-1.7  - Bun/Cr   - Monitor I/O's  - Serial Cr  - Avoid nephrotoxins  - Renally dose medications  - Continue to monitor  - Renal consult pending (called)    # Epilepsy:  - C/w Levetiracetam and Gabapentin     # HLD/HTN:  - C/w CV meds  - Holding home Lisinopril 2/2 SHAGUFTA    # Metastatic Testicular Cancer:  - Had L orchiectomy  - On Etoposide/cisplatin, has finished 3 cycles, last cycle finished on 6/4  - Oncology follow-up outpatient    # Schizophrenia:  - C/w Lurasidone and Lexapro    # DVT ppx:  - IPCs    [employer]        Patient is a 67 year old male with PMHx of Metastatic Testicular Cancer on etoposide/cisplatin s/p L orchiectomy, Epilepsy, Schizophrenia, HTN, HLD and developmental delay. Presents to [facility] for blood transfusion after being found to be anemic to 6.4 on outpatient labs.    Plan:    # Anemia Likely 2/2 Chemo:  - No signs of bleeding noted, given all cell lines down on CBC, likely to be 2/2 chemotherapy-induced bone marrow suppression  - H/H 6.3/19.3  - WBC 3.65  - Platelets 28 - transfuse for <50k w/ bleed, <20k w/ fever and <10k  - sp 1 unit PRBC  - Monitor CBC  - Maintain active T+S  - Transfuse PRN  - Heme consult pending (called)    # SHAGUFTA on CKD3:  - Baseline 1.6-1.7  - Bun/Cr 44/2.20  - Monitor I/O's  - Serial Cr  - Avoid nephrotoxins  - Renally dose medications  - Continue to monitor  - Renal consult pending (called)    # Epilepsy:  - C/w Levetiracetam and Gabapentin     # HLD/HTN:  - C/w CV meds  - Holding home Lisinopril 2/2 SHAGUFTA    # Metastatic Testicular Cancer:  - Had L orchiectomy  - On Etoposide/cisplatin, has finished 3 cycles, last cycle finished on 6/4  - Oncology follow-up outpatient    # Schizophrenia:  - C/w Lurasidone and Lexapro    # DVT ppx:  - IPCs    [employer]

## 2024-06-12 NOTE — PATIENT PROFILE ADULT - FALL HARM RISK - HARM RISK INTERVENTIONS

## 2024-06-12 NOTE — H&P ADULT - PROBLEM SELECTOR PLAN 1
- No signs of bleeding noted, given all cell lines down on CBC, likely to be 2/2 chemotherapy-induced bone amrrow suppression  - pRBCs ordered, but patient with multiple Abs, so still awaiting blood to be released  - F/u repeat CBC after 1u  - Keep active type & screen

## 2024-06-12 NOTE — H&P ADULT - PROBLEM SELECTOR PLAN 4
- Had L orchiectomy  - On Etoposide/cisplatin, has finished 3 cycles  - Oncology follow-up outpatient - Had L orchiectomy  - On Etoposide/cisplatin, has finished 3 cycles, last cycle finished on 6/4  - Oncology follow-up outpatient

## 2024-06-12 NOTE — H&P ADULT - NSHPREVIEWOFSYSTEMS_GEN_ALL_CORE
Review of Systems:   CONSTITUTIONAL: No fever, weight loss  EYES: No eye pain, visual disturbances, or discharge  ENMT:  No difficulty hearing, tinnitus, vertigo; No sinus or throat pain  RESPIRATORY: No SOB. No cough, wheezing, chills or hemoptysis  CARDIOVASCULAR: No chest pain, palpitations, dizziness, or leg swelling  GASTROINTESTINAL: No abdominal or epigastric pain. No nausea, vomiting, or hematemesis; No diarrhea or constipation. No melena or hematochezia.  GENITOURINARY: No dysuria, frequency, hematuria, or incontinence  NEUROLOGICAL: No headaches, memory loss, loss of strength, numbness, or tremors  SKIN: No itching, burning, rashes, or lesions   LYMPH NODES: No enlarged glands  ENDOCRINE: No heat or cold intolerance; No hair loss  MUSCULOSKELETAL: No joint pain or swelling; No muscle, back pain  PSYCHIATRIC: No depression, anxiety, mood swings, or difficulty sleeping  HEME/LYMPH: No easy bruising, or bleeding gums Review of Systems:   CONSTITUTIONAL: No fever, weight loss  EYES: No eye pain, visual disturbances, or discharge  RESPIRATORY: No SOB. No cough, wheezing, chills or hemoptysis  CARDIOVASCULAR: No chest pain, palpitations, dizziness, or leg swelling  GASTROINTESTINAL: No abdominal or epigastric pain. No nausea, vomiting, or hematemesis; No diarrhea or constipation. No melena or hematochezia.  GENITOURINARY: No dysuria, frequency, hematuria, or incontinence  NEUROLOGICAL: No headaches, memory loss, loss of strength, numbness, or tremors  SKIN: No itching, burning, rashes, or lesions   MUSCULOSKELETAL: No joint pain or swelling; No muscle, back pain  PSYCHIATRIC: No depression, anxiety, mood swings, or difficulty sleeping  HEME/LYMPH: No easy bruising, or bleeding gums Review of Systems:   CONSTITUTIONAL: No fever, weight loss, +fatigue  EYES: No eye pain, visual disturbances, or discharge  RESPIRATORY: No SOB. No cough, wheezing, chills or hemoptysis  CARDIOVASCULAR: No chest pain, palpitations, dizziness, or leg swelling  GASTROINTESTINAL: No abdominal or epigastric pain. No nausea, vomiting, or hematemesis; No diarrhea or constipation. No melena or hematochezia.  GENITOURINARY: No dysuria, frequency, hematuria, or incontinence  NEUROLOGICAL: No headaches, memory loss, loss of strength, numbness, or tremors  SKIN: No itching, burning, rashes, or lesions   MUSCULOSKELETAL: No joint pain or swelling; No muscle, back pain  PSYCHIATRIC: No depression, anxiety, mood swings, or difficulty sleeping  HEME/LYMPH: No easy bruising, or bleeding gums

## 2024-06-12 NOTE — CONSULT NOTE ADULT - SUBJECTIVE AND OBJECTIVE BOX
[facility]  [phone]  NEPHROLOGY      INITIAL CONSULT NOTE  --------------------------------------------------------------------------------  HPI:     68 y/o male w/ PMHx metastatic testicular cancer on etoposide/cisplatin s/p L orchiectomy, epilepsy, schizophrenia, HTN, HLD, and developmental delay who presents for low Hb. He has been feeling fatigued and short of breath. He had his routine lab draw appointment today. His labs resulted with a hemoglobin of 6.4, so he was told to go to the ED to get a transfusion, was also given a shot of procrit. He has been on etoposide/cisplatin, has finished 3 cycles, C3 finished on 6/4. Denies any melena, BRBPR, coffee-ground vomit/hematemesis, hematuria, or any other obvious signs of bleeding.     In the ED, he was afebrile and hemodynamically stable, saturating well on RA. CBC w/ pancytopenia (WBC 3.65, Hb 6.3, plts 28). CMP showing evidence of SHAGUFTA on CKD3 w/ BUN/Cr 44/2.2 (baseline 1.6-1.7). Was ordered for 1u pRBCs in the ED.  (12 Jun 2024 01:06)  given shagufta on ckd renal consult called.  follow Dr. ASTRID Tang       PAST HISTORY  --------------------------------------------------------------------------------  PAST MEDICAL & SURGICAL HISTORY:  Hypertension, unspecified type      Other hyperlipidemia      History of epilepsy      Paranoid schizophrenia      Obstructive sleep apnea      H/O Spinal surgery        FAMILY HISTORY:    PAST SOCIAL HISTORY:    ALLERGIES & MEDICATIONS  --------------------------------------------------------------------------------  Allergies    penicillin (Hives)    Intolerances    latex (Rash)    Standing Inpatient Medications  amLODIPine   Tablet 5 milliGRAM(s) Oral daily  atorvastatin 20 milliGRAM(s) Oral at bedtime  escitalopram 15 milliGRAM(s) Oral daily  gabapentin 300 milliGRAM(s) Oral two times a day  lamoTRIgine 100 milliGRAM(s) Oral two times a day  levETIRAcetam 1000 milliGRAM(s) Oral two times a day  loratadine 10 milliGRAM(s) Oral daily  lurasidone 40 milliGRAM(s) Oral daily    PRN Inpatient Medications  acetaminophen     Tablet .. 650 milliGRAM(s) Oral every 6 hours PRN  melatonin 3 milliGRAM(s) Oral at bedtime PRN      REVIEW OF SYSTEMS  --------------------------------------------------------------------------------  Gen: No  fevers/chills   Skin: No rashes  Head/Eyes/Ears/Mouth: No headache; Normal hearing;  No sinus pain/discomfort, sore throat  Respiratory: No dyspnea, cough, wheezing, hemoptysis  CV: No chest pain, orthopnea  GI: No abdominal pain, diarrhea, nausea, vomiting, melena  : No dysuria, decrease urination or hesitancy urinating  hematuria, nocturia  MSK: No joint pain/swelling; no back pain  Neuro: No dizziness/lightheadedness,  also with  +  edema         VITALS/PHYSICAL EXAM  --------------------------------------------------------------------------------  T(C): 36.8 (06-12-24 @ 19:53), Max: 37.3 (06-12-24 @ 18:32)  HR: 65 (06-12-24 @ 19:53) (65 - 74)  BP: 133/62 (06-12-24 @ 19:53) (110/63 - 147/75)  RR: 18 (06-12-24 @ 19:53) (16 - 18)  SpO2: 100% (06-12-24 @ 19:53) (97% - 100%)  Wt(kg): --  Height (cm): 157.5 (06-11-24 @ 17:20)  Weight (kg): 90.7 (06-11-24 @ 17:20)  BMI (kg/m2): 36.6 (06-11-24 @ 17:20)  BSA (m2): 1.91 (06-11-24 @ 17:20)      06-11-24 @ 07:01  -  06-12-24 @ 07:00  --------------------------------------------------------  IN: 250 mL / OUT: 0 mL / NET: 250 mL    06-12-24 @ 07:01  -  06-12-24 @ 23:07  --------------------------------------------------------  IN: 250 mL / OUT: 0 mL / NET: 250 mL      Physical Exam:  	Gen: pale ill appearing   	no jvd   	Pulm: decrease bs  no rales or ronchi or wheezing  	CV: RRR, S1S2; no rub  	Back: No CVA tenderness; no sacral edema  	Abd: +BS, soft, nontender/ + distended  	: No suprapubic tenderness  	UE: Warm, no cyanosis  no clubbing,  no edema; no asterixis  	LE: Warm, no cyanosis  no clubbing, no edema  	Neuro: alert and oriented. speech coherent   	  LABS/STUDIES  --------------------------------------------------------------------------------              7.6    4.15  >-----------<  39       [06-12-24 @ 22:26]              23.0     141  |  109  |  43  ----------------------------<  119      [06-12-24 @ 08:10]  4.9   |  18  |  2.11        Ca     9.4     [06-12-24 @ 08:10]    TPro  6.3  /  Alb  4.0  /  TBili  0.6  /  DBili  x   /  AST  18  /  ALT  26  /  AlkPhos  130  [06-12-24 @ 08:10]    PT/INR: PT 10.6 , INR 0.96       [06-11-24 @ 20:43]  PTT: 29.5       [06-11-24 @ 20:43]      Creatinine Trend:  SCr 2.11 [06-12 @ 08:10]  SCr 2.20 [06-11 @ 20:43]      HCV 0.05, Nonreact      [01-22-23 @ 07:27]

## 2024-06-12 NOTE — H&P ADULT - ASSESSMENT
66 y/o male w/ PMHx metastatic testicular cancer on etoposide/cisplatin s/p L orchiectomy, epilepsy, schizophrenia, HTN, HLD, and developmental delay who presents for blood transfusion after being found to be anemic to 6.4 on outpatient labs. Admitted for pancytopenia.

## 2024-06-12 NOTE — H&P ADULT - PROBLEM SELECTOR PLAN 5
- C/w Levetiracetam 1g BID  - C/w lamotrigine 100mg BID - C/w Levetiracetam 1g BID  - C/w gabapentin 300mg BID

## 2024-06-12 NOTE — H&P ADULT - NSHPLABSRESULTS_GEN_ALL_CORE
6.3    3.65  )-----------( 28       ( 11 Jun 2024 20:43 )             19.3     06-11    138  |  106  |  44<H>  ----------------------------<  96  5.3   |  20<L>  |  2.20<H>    Ca    9.4      11 Jun 2024 20:43    TPro  6.5  /  Alb  4.1  /  TBili  0.4  /  DBili  x   /  AST  16  /  ALT  25  /  AlkPhos  129<H>  06-11          LIVER FUNCTIONS - ( 11 Jun 2024 20:43 )  Alb: 4.1 g/dL / Pro: 6.5 g/dL / ALK PHOS: 129 U/L / ALT: 25 U/L / AST: 16 U/L / GGT: x           PT/INR - ( 11 Jun 2024 20:43 )   PT: 10.6 sec;   INR: 0.96 ratio         PTT - ( 11 Jun 2024 20:43 )  PTT:29.5 sec  Urinalysis Basic - ( 11 Jun 2024 20:43 )    Color: x / Appearance: x / SG: x / pH: x  Gluc: 96 mg/dL / Ketone: x  / Bili: x / Urobili: x   Blood: x / Protein: x / Nitrite: x   Leuk Esterase: x / RBC: x / WBC x   Sq Epi: x / Non Sq Epi: x / Bacteria: x

## 2024-06-12 NOTE — H&P ADULT - PROBLEM SELECTOR PLAN 3
- Current Cr 2.2, baseline 1.6-1.7  - Likely pre-renal injury in setting of hypovolemia 2/2 acute anemia  - Monitor Cr  - Has CKD3 at baseline  - Avoid nephrotoxic medications, dose meds appropriately for GFR - Current Cr 2.2, baseline 1.6-1.7  - Likely pre-renal injury in setting of hypovolemia 2/2 acute anemia  - Monitor Cr  - Has CKD3 at baseline  - Avoid nephrotoxic medications, dose meds appropriately for GFR  - On jardiance 10mg daily for renal-protective effect, hold while admitted

## 2024-06-12 NOTE — H&P ADULT - NSHPPHYSICALEXAM_GEN_ALL_CORE
Vital Signs Last 24 Hrs  T(C): 36.8 (11 Jun 2024 20:33), Max: 37 (11 Jun 2024 17:20)  T(F): 98.2 (11 Jun 2024 20:33), Max: 98.6 (11 Jun 2024 17:20)  HR: 68 (11 Jun 2024 20:33) (68 - 89)  BP: 130/69 (11 Jun 2024 20:33) (112/69 - 130/69)  BP(mean): --  RR: 16 (11 Jun 2024 20:33) (16 - 18)  SpO2: 99% (11 Jun 2024 20:33) (99% - 100%)    Parameters below as of 11 Jun 2024 20:33  Patient On (Oxygen Delivery Method): room air        CONSTITUTIONAL: Well-groomed, in no apparent distress  EYES: No conjunctival or scleral injection, non-icteric;   ENMT: No external nasal lesions; MMM  NECK: Trachea midline without palpable neck mass; thyroid not enlarged and non-tender  RESPIRATORY: Breathing comfortably; no dullness to percussion; lungs CTA without wheeze/rhonchi/rales  CARDIOVASCULAR: +S1S2, RRR, no M/G/R; pedal pulses full and symmetric; no lower extremity edema  GASTROINTESTINAL: No palpable masses or tenderness, +BS throughout, no rebound/guarding; no hepatosplenomegaly; no hernia palpated  LYMPHATIC: No cervical LAD or tenderness  SKIN: No rashes or ulcers noted  NEUROLOGIC: CN II-XII intact; sensation intact in LEs b/l to light touch  PSYCHIATRIC: A+O x 3; mood and affect appropriate; appropriate insight and judgment Vital Signs Last 24 Hrs  T(C): 36.8 (11 Jun 2024 20:33), Max: 37 (11 Jun 2024 17:20)  T(F): 98.2 (11 Jun 2024 20:33), Max: 98.6 (11 Jun 2024 17:20)  HR: 68 (11 Jun 2024 20:33) (68 - 89)  BP: 130/69 (11 Jun 2024 20:33) (112/69 - 130/69)  BP(mean): --  RR: 16 (11 Jun 2024 20:33) (16 - 18)  SpO2: 99% (11 Jun 2024 20:33) (99% - 100%)    Parameters below as of 11 Jun 2024 20:33  Patient On (Oxygen Delivery Method): room air    CONSTITUTIONAL: Well-groomed, in no apparent distress  EYES: No conjunctival or scleral injection, non-icteric;   NECK: Trachea midline without palpable neck mass  RESPIRATORY: Breathing comfortably; lungs CTA without wheeze/rhonchi/rales  CARDIOVASCULAR: +S1S2, RRR, no M/G/R; no lower extremity edema  GASTROINTESTINAL: No palpable masses or tenderness, +BS throughout, no rebound/guarding  SKIN: No rashes or ulcers noted  NEUROLOGIC: Sensation intact in LEs b/l to light touch, no focal deficits  PSYCHIATRIC: A+O x 3

## 2024-06-12 NOTE — H&P ADULT - HISTORY OF PRESENT ILLNESS
This is a 68 y/o male w/ PMHx metastatic testicular cancer on etoposide/cisplatin s/p L orchiectomy, epilepsy, schizophrenia, HTN, HLD, and developmental delay who presents for low Hb. He has been feeling fatigued and short of breath, so he went to his outpatient provider to get labs drawn. His labs resulted with a hemoglobin of 6.4, so he was told to go to the ED to get a transfusion. He has been on etoposide/cisplatin, has finished 3 cycles. Denies any melena, BRBPR, coffee-ground vomit/hematemesis, hematuria, or any other obvious signs of bleeding.     In the ED, he was afebrile and hemodynamically stable, saturating well on RA. CBC w/ pancytopenia (WBC 3.65, Hb 6.3, plts 28). CMP showing evidence of SHAGUFTA on CKD3 w/ BUN/Cr 44/2.2 (baseline 1.6-1.7). Was ordered for 1u pRBCs in the ED.  This is a 68 y/o male w/ PMHx metastatic testicular cancer on etoposide/cisplatin s/p L orchiectomy, epilepsy, schizophrenia, HTN, HLD, and developmental delay who presents for low Hb. He has been feeling fatigued and short of breath. He had his routine lab draw appointment today. His labs resulted with a hemoglobin of 6.4, so he was told to go to the ED to get a transfusion, was also given a shot of procrit. He has been on etoposide/cisplatin, has finished 3 cycles, C3 finished on 6/4. Denies any melena, BRBPR, coffee-ground vomit/hematemesis, hematuria, or any other obvious signs of bleeding.     In the ED, he was afebrile and hemodynamically stable, saturating well on RA. CBC w/ pancytopenia (WBC 3.65, Hb 6.3, plts 28). CMP showing evidence of SHAGUFTA on CKD3 w/ BUN/Cr 44/2.2 (baseline 1.6-1.7). Was ordered for 1u pRBCs in the ED.

## 2024-06-12 NOTE — PATIENT PROFILE ADULT - NSPROGENBLOODRESTRICT_GEN_A_NUR
----- Message from Yolanda Rios sent at 4/5/2022 10:33 AM CDT -----  Contact: [phone]  Type:  Needs Medical Advice    Who Called: Pt  Reason for call: pt is returning a call regarding his appt  Would the patient rather a call back or a response via [facility]? Call back  Best Call Back Number: [phone]        
Pt was called, his EMG and f/u appt with G[facility] Alfred, PAC were rescheduled.   
none

## 2024-06-12 NOTE — PROGRESS NOTE ADULT - SUBJECTIVE AND OBJECTIVE BOX
SUBJECTIVE / OVERNIGHT EVENTS:    patient seen and examined in ED  resting comfortably OOB in chair  sister at bedside  no c/o at this time    --------------------------------------------------------------------------------------------  LABS:                        7.1    3.44  )-----------( 30       ( 12 Jun 2024 08:10 )             22.0     06-12    141  |  109<H>  |  43<H>  ----------------------------<  119<H>  4.9   |  18<L>  |  2.11<H>    Ca    9.4      12 Jun 2024 08:10    TPro  6.3  /  Alb  4.0  /  TBili  0.6  /  DBili  x   /  AST  18  /  ALT  26  /  AlkPhos  130<H>  06-12    PT/INR - ( 11 Jun 2024 20:43 )   PT: 10.6 sec;   INR: 0.96 ratio         PTT - ( 11 Jun 2024 20:43 )  PTT:29.5 sec  CAPILLARY BLOOD GLUCOSE            Urinalysis Basic - ( 12 Jun 2024 08:10 )    Color: x / Appearance: x / SG: x / pH: x  Gluc: 119 mg/dL / Ketone: x  / Bili: x / Urobili: x   Blood: x / Protein: x / Nitrite: x   Leuk Esterase: x / RBC: x / WBC x   Sq Epi: x / Non Sq Epi: x / Bacteria: x        RADIOLOGY & ADDITIONAL TESTS:    Imaging Personally Reviewed:  [x] YES  [ ] NO    Consultant(s) Notes Reviewed:  [x] YES  [ ] NO    MEDICATIONS  (STANDING):  amLODIPine   Tablet 5 milliGRAM(s) Oral daily  atorvastatin 20 milliGRAM(s) Oral at bedtime  escitalopram 15 milliGRAM(s) Oral daily  gabapentin 300 milliGRAM(s) Oral two times a day  lamoTRIgine 100 milliGRAM(s) Oral two times a day  levETIRAcetam 1000 milliGRAM(s) Oral two times a day  lurasidone 40 milliGRAM(s) Oral daily    MEDICATIONS  (PRN):  acetaminophen     Tablet .. 650 milliGRAM(s) Oral every 6 hours PRN Temp greater or equal to 38C (100.4F), Mild Pain (1 - 3)  melatonin 3 milliGRAM(s) Oral at bedtime PRN Insomnia      Care Discussed with Consultants/Other Providers [x] YES  [ ] NO    Vital Signs Last 24 Hrs  T(C): 36.8 (12 Jun 2024 09:01), Max: 37.1 (12 Jun 2024 02:46)  T(F): 98.2 (12 Jun 2024 09:01), Max: 98.8 (12 Jun 2024 02:46)  HR: 74 (12 Jun 2024 09:01) (68 - 89)  BP: 110/63 (12 Jun 2024 09:01) (110/63 - 147/75)  BP(mean): 99 (12 Jun 2024 02:46) (99 - 99)  RR: 18 (12 Jun 2024 09:01) (16 - 18)  SpO2: 97% (12 Jun 2024 09:01) (97% - 100%)    Parameters below as of 12 Jun 2024 09:01  Patient On (Oxygen Delivery Method): room air      I&O's Summary    PHYSICAL EXAM:  GENERAL: NAD, well-developed, comfortable  HEAD:  Atraumatic, Normocephalic  EYES: EOMI, PERRLA, conjunctiva and sclera clear  NECK: Supple, No JVD  CHEST/LUNG: Clear to auscultation bilaterally; No wheeze  HEART: Regular rate and rhythm; No murmurs, rubs, or gallops  ABDOMEN: Soft, Nontender, Nondistended; Bowel sounds present  NEURO: AAOx3, no focal weakness, 5/5 b/l extremity strength, b/l knee no arthritis, no effusion   EXTREMITIES:  2+ Peripheral Pulses, No clubbing, cyanosis, or edema  SKIN: No rashes or lesions

## 2024-06-13 ENCOUNTER — TRANSCRIPTION ENCOUNTER (OUTPATIENT)
Age: 69
End: 2024-06-13

## 2024-06-13 VITALS
DIASTOLIC BLOOD PRESSURE: 82 MMHG | HEART RATE: 82 BPM | SYSTOLIC BLOOD PRESSURE: 131 MMHG | RESPIRATION RATE: 18 BRPM | TEMPERATURE: 99 F | OXYGEN SATURATION: 100 %

## 2024-06-13 LAB
ALBUMIN SERPL ELPH-MCNC: 4.1 G/DL — SIGNIFICANT CHANGE UP (ref 3.3–5)
ALP SERPL-CCNC: 132 U/L — HIGH (ref 40–120)
ALT FLD-CCNC: 27 U/L — SIGNIFICANT CHANGE UP (ref 10–45)
ANION GAP SERPL CALC-SCNC: 12 MMOL/L — SIGNIFICANT CHANGE UP (ref 5–17)
AST SERPL-CCNC: 15 U/L — SIGNIFICANT CHANGE UP (ref 10–40)
BILIRUB SERPL-MCNC: 0.4 MG/DL — SIGNIFICANT CHANGE UP (ref 0.2–1.2)
BUN SERPL-MCNC: 38 MG/DL — HIGH (ref 7–23)
CALCIUM SERPL-MCNC: 9.3 MG/DL — SIGNIFICANT CHANGE UP (ref 8.4–10.5)
CHLORIDE SERPL-SCNC: 109 MMOL/L — HIGH (ref 96–108)
CO2 SERPL-SCNC: 19 MMOL/L — LOW (ref 22–31)
CREAT SERPL-MCNC: 2.02 MG/DL — HIGH (ref 0.5–1.3)
EGFR: 35 ML/MIN/1.73M2 — LOW
GLUCOSE SERPL-MCNC: 94 MG/DL — SIGNIFICANT CHANGE UP (ref 70–99)
HCT VFR BLD CALC: 27.7 % — LOW (ref 39–50)
HGB BLD-MCNC: 9 G/DL — LOW (ref 13–17)
MCHC RBC-ENTMCNC: 31.5 PG — SIGNIFICANT CHANGE UP (ref 27–34)
MCHC RBC-ENTMCNC: 32.5 GM/DL — SIGNIFICANT CHANGE UP (ref 32–36)
MCV RBC AUTO: 96.9 FL — SIGNIFICANT CHANGE UP (ref 80–100)
NRBC # BLD: 0 /100 WBCS — SIGNIFICANT CHANGE UP (ref 0–0)
PLATELET # BLD AUTO: 52 K/UL — LOW (ref 150–400)
POTASSIUM SERPL-MCNC: 5.1 MMOL/L — SIGNIFICANT CHANGE UP (ref 3.5–5.3)
POTASSIUM SERPL-SCNC: 5.1 MMOL/L — SIGNIFICANT CHANGE UP (ref 3.5–5.3)
PROT SERPL-MCNC: 6.3 G/DL — SIGNIFICANT CHANGE UP (ref 6–8.3)
RBC # BLD: 2.86 M/UL — LOW (ref 4.2–5.8)
RBC # FLD: 16.4 % — HIGH (ref 10.3–14.5)
SODIUM SERPL-SCNC: 140 MMOL/L — SIGNIFICANT CHANGE UP (ref 135–145)
WBC # BLD: 4.75 K/UL — SIGNIFICANT CHANGE UP (ref 3.8–10.5)
WBC # FLD AUTO: 4.75 K/UL — SIGNIFICANT CHANGE UP (ref 3.8–10.5)

## 2024-06-13 PROCEDURE — 94660 CPAP INITIATION&MGMT: CPT

## 2024-06-13 PROCEDURE — 86870 RBC ANTIBODY IDENTIFICATION: CPT

## 2024-06-13 PROCEDURE — 85025 COMPLETE CBC W/AUTO DIFF WBC: CPT

## 2024-06-13 PROCEDURE — 36430 TRANSFUSION BLD/BLD COMPNT: CPT

## 2024-06-13 PROCEDURE — 86922 COMPATIBILITY TEST ANTIGLOB: CPT

## 2024-06-13 PROCEDURE — P9040: CPT

## 2024-06-13 PROCEDURE — 86902 BLOOD TYPE ANTIGEN DONOR EA: CPT

## 2024-06-13 PROCEDURE — 99285 EMERGENCY DEPT VISIT HI MDM: CPT

## 2024-06-13 PROCEDURE — 86900 BLOOD TYPING SEROLOGIC ABO: CPT

## 2024-06-13 PROCEDURE — 85027 COMPLETE CBC AUTOMATED: CPT

## 2024-06-13 PROCEDURE — 36415 COLL VENOUS BLD VENIPUNCTURE: CPT

## 2024-06-13 PROCEDURE — 85610 PROTHROMBIN TIME: CPT

## 2024-06-13 PROCEDURE — 86880 COOMBS TEST DIRECT: CPT

## 2024-06-13 PROCEDURE — 86850 RBC ANTIBODY SCREEN: CPT

## 2024-06-13 PROCEDURE — 85730 THROMBOPLASTIN TIME PARTIAL: CPT

## 2024-06-13 PROCEDURE — 86905 BLOOD TYPING RBC ANTIGENS: CPT

## 2024-06-13 PROCEDURE — 86901 BLOOD TYPING SEROLOGIC RH(D): CPT

## 2024-06-13 PROCEDURE — 80053 COMPREHEN METABOLIC PANEL: CPT

## 2024-06-13 RX ORDER — LISINOPRIL 2.5 MG/1
1 TABLET ORAL
Qty: 0 | Refills: 0 | DISCHARGE

## 2024-06-13 RX ADMIN — AMLODIPINE BESYLATE 5 MILLIGRAM(S): 2.5 TABLET ORAL at 05:19

## 2024-06-13 RX ADMIN — LEVETIRACETAM 1000 MILLIGRAM(S): 250 TABLET, FILM COATED ORAL at 05:19

## 2024-06-13 RX ADMIN — ESCITALOPRAM OXALATE 15 MILLIGRAM(S): 10 TABLET, FILM COATED ORAL at 11:03

## 2024-06-13 RX ADMIN — GABAPENTIN 300 MILLIGRAM(S): 400 CAPSULE ORAL at 05:19

## 2024-06-13 RX ADMIN — LAMOTRIGINE 100 MILLIGRAM(S): 25 TABLET, ORALLY DISINTEGRATING ORAL at 05:19

## 2024-06-13 RX ADMIN — LORATADINE 10 MILLIGRAM(S): 10 TABLET ORAL at 11:04

## 2024-06-13 NOTE — PROGRESS NOTE ADULT - SUBJECTIVE AND OBJECTIVE BOX
SUBJECTIVE / OVERNIGHT EVENTS:      Patient seen and examined at bedside. No events noted overnight. Resting in bed. Family at bedside      --------------------------------------------------------------------------------------------  LABS:                        9.0    4.75  )-----------( 52       ( 13 Jun 2024 07:37 )             27.7     06-13    140  |  109<H>  |  38<H>  ----------------------------<  94  5.1   |  19<L>  |  2.02<H>    Ca    9.3      13 Jun 2024 07:37    TPro  6.3  /  Alb  4.1  /  TBili  0.4  /  DBili  x   /  AST  15  /  ALT  27  /  AlkPhos  132<H>  06-13    PT/INR - ( 11 Jun 2024 20:43 )   PT: 10.6 sec;   INR: 0.96 ratio         PTT - ( 11 Jun 2024 20:43 )  PTT:29.5 sec  CAPILLARY BLOOD GLUCOSE            Urinalysis Basic - ( 13 Jun 2024 07:37 )    Color: x / Appearance: x / SG: x / pH: x  Gluc: 94 mg/dL / Ketone: x  / Bili: x / Urobili: x   Blood: x / Protein: x / Nitrite: x   Leuk Esterase: x / RBC: x / WBC x   Sq Epi: x / Non Sq Epi: x / Bacteria: x        RADIOLOGY & ADDITIONAL TESTS:     Imaging Personally Reviewed:  [x] YES  [ ] NO    Consultant(s) Notes Reviewed:  [x] YES  [ ] NO    MEDICATIONS  (STANDING):  amLODIPine   Tablet 5 milliGRAM(s) Oral daily  atorvastatin 20 milliGRAM(s) Oral at bedtime  escitalopram 15 milliGRAM(s) Oral daily  gabapentin 300 milliGRAM(s) Oral two times a day  lamoTRIgine 100 milliGRAM(s) Oral two times a day  levETIRAcetam 1000 milliGRAM(s) Oral two times a day  loratadine 10 milliGRAM(s) Oral daily  lurasidone 40 milliGRAM(s) Oral daily    MEDICATIONS  (PRN):  acetaminophen     Tablet .. 650 milliGRAM(s) Oral every 6 hours PRN Temp greater or equal to 38C (100.4F), Mild Pain (1 - 3)  melatonin 3 milliGRAM(s) Oral at bedtime PRN Insomnia      Care Discussed with Consultants/Other Providers [x] YES  [ ] NO    Vital Signs Last 24 Hrs  T(C): 36.4 (13 Jun 2024 09:06), Max: 37.3 (12 Jun 2024 18:32)  T(F): 97.6 (13 Jun 2024 09:06), Max: 99.1 (12 Jun 2024 18:32)  HR: 70 (13 Jun 2024 09:06) (58 - 70)  BP: 117/74 (13 Jun 2024 09:06) (116/72 - 162/80)  BP(mean): --  RR: 18 (13 Jun 2024 09:06) (14 - 18)  SpO2: 99% (13 Jun 2024 09:06) (97% - 100%)    Parameters below as of 13 Jun 2024 09:06  Patient On (Oxygen Delivery Method): BiPAP/CPAP      I&O's Summary    12 Jun 2024 07:01  -  13 Jun 2024 07:00  --------------------------------------------------------  IN: 250 mL / OUT: 0 mL / NET: 250 mL        PHYSICAL EXAM:  GENERAL: NAD, well-developed, comfortable  HEAD:  Atraumatic, Normocephalic  EYES: EOMI, PERRLA, conjunctiva and sclera clear  NECK: Supple, No JVD  CHEST/LUNG: Clear to auscultation bilaterally; No wheeze  HEART: Regular rate and rhythm; No murmurs, rubs, or gallops  ABDOMEN: Soft, Nontender, Nondistended; Bowel sounds present  NEURO: AAOx3, no focal weakness, 5/5 b/l extremity strength, b/l knee no arthritis, no effusion   EXTREMITIES:  2+ Peripheral Pulses, No clubbing, cyanosis, or edema  SKIN: No rashes or lesions

## 2024-06-13 NOTE — DISCHARGE NOTE PROVIDER - NSDCCPCAREPLAN_GEN_ALL_CORE_FT
PRINCIPAL DISCHARGE DIAGNOSIS  Diagnosis: Anemia  Assessment and Plan of Treatment: follow up outpt for CBC/hgb monitoring  s/p 2un prbc   follow up [facility] hematology outpatient      SECONDARY DISCHARGE DIAGNOSES  Diagnosis: Testicular cancer  Assessment and Plan of Treatment: follow up with hematology/oncology  outpatient     PRINCIPAL DISCHARGE DIAGNOSIS  Diagnosis: Anemia  Assessment and Plan of Treatment: follow up outpt for CBC/hgb monitoring  s/p 2un prbc   follow up [facility] hematology outpatient      SECONDARY DISCHARGE DIAGNOSES  Diagnosis: Testicular cancer  Assessment and Plan of Treatment: follow up with hematology/oncology  outpatient    Diagnosis: Acute kidney injury superimposed on CKD  Assessment and Plan of Treatment: lisinopril held secondary to SHAGUFTA on CKD- do not resume unless advised per PCP and nephrology

## 2024-06-13 NOTE — DISCHARGE NOTE PROVIDER - NSDCFUSCHEDAPPT_GEN_ALL_CORE_FT
Ekaterina Gillis  [facility]  [address]  Scheduled Appointment: 07/08/2024

## 2024-06-13 NOTE — DISCHARGE NOTE PROVIDER - HOSPITAL COURSE
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 He has been feeling fatigued and short of breath. He had his routine lab draw appointment today. His labs resulted with a hemoglobin of 6.4, so he was told to go to the ED to get a transfusion, was also given a shot of procrit. He has been on etoposide/cisplatin, has finished 3 cycles, C3 finished on 6/4. Denies any melena, BRBPR, coffee-ground vomit/hematemesis, hematuria, or any other obvious signs of bleeding. In the ED, he was afebrile and hemodynamically stable, saturating well on RA. CBC w/ pancytopenia (WBC 3.65, Hb 6.3, plts 28). CMP showing evidence of SHAGUFTA on CKD3 w/ BUN/Cr 44/2.2 (baseline 1.6-1.7). Was ordered for 1u pRBCs in the ED.      Hospital Course:  Pt presented anemic, likely 2/2 chemo. No signs of bleeding noted, given all cell lines down on CBC, likely to be 2/2 chemotherapy-induced bone marrow suppression. Hemoglobin noted for 6.3, s/p 2un PRBC hgb improved to 9.0. Pt w/ hx metastatic testicular cancer, s/p L orchiectomy and s/p 3 cycles of chemo (last 6/4) with heme onc, pt to continue follow up as outpatient. Pts labs consistent CKD3 w/ progression of disease. Nephrology evaluated and rec to avoid nephrotoxins and follow up with nephrology as outpatient. Lisinopril held in setting of SHAGUFTA. Pt to follow up with PCP outpatient.     Important Medication Changes and Reason:  -     Active or Pending Issues Requiring Follow-up:  - Follow up with PCP  - Follow up with heme/onc  - Follow up with nephrology    Advanced Directives:   [X] Full code  [ ] DNR  [ ] Hospice    Discharge Diagnoses:  - Anemia  - Pancytopenia  - SHAGUFTA on CKD        Discharge/Dispo/Med rec discussed with attending Dr. Knight. 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 He has been feeling fatigued and short of breath. He had his routine lab draw appointment today. His labs resulted with a hemoglobin of 6.4, so he was told to go to the ED to get a transfusion, was also given a shot of procrit. He has been on etoposide/cisplatin, has finished 3 cycles, C3 finished on 6/4. Denies any melena, BRBPR, coffee-ground vomit/hematemesis, hematuria, or any other obvious signs of bleeding. In the ED, he was afebrile and hemodynamically stable, saturating well on RA. CBC w/ pancytopenia (WBC 3.65, Hb 6.3, plts 28). CMP showing evidence of SHAGUFTA on CKD3 w/ BUN/Cr 44/2.2 (baseline 1.6-1.7). Was ordered for 1u pRBCs in the ED.      Hospital Course:  Pt presented anemic, likely 2/2 chemo. No signs of bleeding noted, given all cell lines down on CBC, likely to be 2/2 chemotherapy-induced bone marrow suppression. Hemoglobin noted for 6.3, s/p 2un PRBC hgb improved to 9.0. Pt w/ hx metastatic testicular cancer, s/p L orchiectomy and s/p 3 cycles of chemo (last 6/4) with heme onc, pt to continue follow up as outpatient. Pts labs consistent CKD3 w/ progression of disease. Nephrology evaluated and rec to avoid nephrotoxins and follow up with nephrology as outpatient. Lisinopril held in setting of SHAGUFTA. Pt to follow up with PCP outpatient.     Important Medication Changes and Reason:  - lisinopril held given SHAGUFTA- do not resume unless advised per PCP/nephrology     Active or Pending Issues Requiring Follow-up:  - Follow up with PCP  - Follow up with heme/onc  - Follow up with nephrology    Advanced Directives:   [X] Full code  [ ] DNR  [ ] Hospice    Discharge Diagnoses:  - Anemia  - Pancytopenia  - SHAGUFTA on CKD        Discharge/Dispo/Med rec discussed with attending Dr. Knight. Patient medically cleared for discharge Home with outpatient follow up with PCP, nephrology, and heme/onc

## 2024-06-13 NOTE — PROGRESS NOTE ADULT - ASSESSMENT
Mr. Gr is a 68 year old male with PMHx of seizure disorder, sleep apnea, HTN, chronic idiopathic constipation, stage III CKD, severe obesity, secondary hyperparathyroidism, anemia, thrombocytopenia, hyperlipidemia, testicular cancer under treatment with Dr. Ovalles who is admitted with symptomatic anemia.     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  Margins were negative.  pT3Nx. CT C/A/P with few left para-aortic and left iliac chain lymph nodes largest measuring 8.1 cm left para-aortic node, bilateral subcentimeter noncalcified pulmonary nodules measuring up to 7 mm. AFP, LDH, hCG were all elevated. 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 Started on adjuvant therapy with Cisplatin+Etoposide, so far completed 3 cycles of treatment with cisplatin dose reduced 50% and Etoposide 50% due to thrombocytopenia.         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 Gr, please do not hesitate to call or text me if you have further questions or concerns.     Brayan Mart MD  [facility] [place]   Division of Hematology/Oncology  [address] [place] [address], [address]  [place] [address]  P: [phone]  F: [phone]    Attestation:   Total time spent on the encounter: >60 minutes   ----Including  face-to-face interaction in addition to chart review, reviewing treatment plan, and managing the patient’s chronic diagnoses as listed in the assessment----     1.	I have reviewed, analyzed and interpreted the following labs: CBC, CMP, Coags, Ab imaging:  CT C/A/P outpatient impressions as above.   2.	I have reviewed notes stating pts current admission, consultants and follow ups.   3.	I have reviewed the past medical, family, and surgical history and confirmed with outpatient records    Mr. Gr is a 68 year old male with PMHx of seizure disorder, sleep apnea, HTN, chronic idiopathic constipation, stage III CKD, severe obesity, secondary hyperparathyroidism, anemia, thrombocytopenia, hyperlipidemia, testicular cancer under treatment with Dr. Ovalles who is admitted with symptomatic anemia.     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  Margins were negative.  pT3Nx. CT C/A/P with few left para-aortic and left iliac chain lymph nodes largest measuring 8.1 cm left para-aortic node, bilateral subcentimeter noncalcified pulmonary nodules measuring up to 7 mm. AFP, LDH, hCG were all elevated. 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 Started on adjuvant therapy with Cisplatin+Etoposide, so far completed 3 cycles of treatment with cisplatin dose reduced 50% and Etoposide 50% due to thrombocytopenia.         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 Gr, please do not hesitate to call or text me if you have further questions or concerns.     Brayan Mart MD  [facility] [place]   Division of Hematology/Oncology  [address] [facility], [address]  [place] [address]  P: [phone]  F: [phone]    Attestation:    ----Pt evaluated including  face-to-face interaction in addition to chart review, reviewing treatment plan, and managing the patient’s chronic diagnoses as listed in the assessment----

## 2024-06-13 NOTE — DISCHARGE NOTE PROVIDER - CARE PROVIDER_API CALL
Maikel Gardner  Internal Medicine  [address] [place] [address], [address]  [place] [address]  Phone: [phone]  Fax: [phone]  Follow Up Time:     Dao Morris  Nephrology  [address] [facility], [address]  [place] [address]  Phone: [phone]  Fax: [phone]  Follow Up Time: 1 week    Franki Ovalles  [place] [address] [facility], [address]  [place] [address]  Phone: [phone]  Fax: [phone]  Follow Up Time:

## 2024-06-13 NOTE — PROGRESS NOTE ADULT - SUBJECTIVE AND OBJECTIVE BOX
[facility] HEMATOLOGY/ONCOLOGY INPATIENT PROGRESS NOTE     Interval Hx:   06-13-24: Mr. Gr was seen at bedside today.    Meds:   MEDICATIONS  (STANDING):  amLODIPine   Tablet 5 milliGRAM(s) Oral daily  atorvastatin 20 milliGRAM(s) Oral at bedtime  escitalopram 15 milliGRAM(s) Oral daily  gabapentin 300 milliGRAM(s) Oral two times a day  lamoTRIgine 100 milliGRAM(s) Oral two times a day  levETIRAcetam 1000 milliGRAM(s) Oral two times a day  loratadine 10 milliGRAM(s) Oral daily  lurasidone 40 milliGRAM(s) Oral daily    MEDICATIONS  (PRN):  acetaminophen     Tablet .. 650 milliGRAM(s) Oral every 6 hours PRN Temp greater or equal to 38C (100.4F), Mild Pain (1 - 3)  melatonin 3 milliGRAM(s) Oral at bedtime PRN Insomnia    Vital Signs Last 24 Hrs  T(C): 36.3 (13 Jun 2024 00:26), Max: 37.3 (12 Jun 2024 18:32)  T(F): 97.4 (13 Jun 2024 00:26), Max: 99.1 (12 Jun 2024 18:32)  HR: 58 (13 Jun 2024 04:50) (58 - 74)  BP: 162/80 (13 Jun 2024 00:26) (110/63 - 162/80)  BP(mean): --  RR: 14 (13 Jun 2024 04:50) (14 - 18)  SpO2: 99% (13 Jun 2024 04:50) (97% - 100%)    Parameters below as of 13 Jun 2024 04:50  Patient On (Oxygen Delivery Method): BiPAP/CPAP    Physical Exam:  Gen: NAD  HEENT: EOMI, MMM  Chest:  speaking full sentences, equal chest rise  Cardiac: RR  Abd:  nondistended  Ext:  1+ edema bilaterally  Neuro: AAOX3, normal mood and affect    Labs:                        7.6    4.15  )-----------( 39       ( 12 Jun 2024 22:26 )             23.0     CBC Full  -  ( 12 Jun 2024 22:26 )  WBC Count : 4.15 K/uL  RBC Count : 2.41 M/uL  Hemoglobin : 7.6 g/dL  Hematocrit : 23.0 %  Platelet Count - Automated : 39 K/uL  Mean Cell Volume : 95.4 fl  Mean Cell Hemoglobin : 31.5 pg  Mean Cell Hemoglobin Concentration : 33.0 gm/dL    06-12    141  |  109<H>  |  43<H>  ----------------------------<  119<H>  4.9   |  18<L>  |  2.11<H>    Ca    9.4      12 Jun 2024 08:10    TPro  6.3  /  Alb  4.0  /  TBili  0.6  /  DBili  x   /  AST  18  /  ALT  26  /  AlkPhos  130<H>  06-12    PT/INR - ( 11 Jun 2024 20:43 )   PT: 10.6 sec;   INR: 0.96 ratio         PTT - ( 11 Jun 2024 20:43 )  PTT:29.5 sec  Bilirubin Total: 0.6 mg/dL (06-12-24 @ 08:10)   [facility] HEMATOLOGY/ONCOLOGY INPATIENT PROGRESS NOTE     Interval Hx:   06-13-24: Mr. Gr was seen at bedside today, awake and alert, s/p 2u PRBC this admission, overall inappropriately low response, recommend to transfuse to Hb >8.5 prior to considering discharge     Meds:   MEDICATIONS  (STANDING):  amLODIPine   Tablet 5 milliGRAM(s) Oral daily  atorvastatin 20 milliGRAM(s) Oral at bedtime  escitalopram 15 milliGRAM(s) Oral daily  gabapentin 300 milliGRAM(s) Oral two times a day  lamoTRIgine 100 milliGRAM(s) Oral two times a day  levETIRAcetam 1000 milliGRAM(s) Oral two times a day  loratadine 10 milliGRAM(s) Oral daily  lurasidone 40 milliGRAM(s) Oral daily    MEDICATIONS  (PRN):  acetaminophen     Tablet .. 650 milliGRAM(s) Oral every 6 hours PRN Temp greater or equal to 38C (100.4F), Mild Pain (1 - 3)  melatonin 3 milliGRAM(s) Oral at bedtime PRN Insomnia    Vital Signs Last 24 Hrs  T(C): 36.3 (13 Jun 2024 00:26), Max: 37.3 (12 Jun 2024 18:32)  T(F): 97.4 (13 Jun 2024 00:26), Max: 99.1 (12 Jun 2024 18:32)  HR: 58 (13 Jun 2024 04:50) (58 - 74)  BP: 162/80 (13 Jun 2024 00:26) (110/63 - 162/80)  BP(mean): --  RR: 14 (13 Jun 2024 04:50) (14 - 18)  SpO2: 99% (13 Jun 2024 04:50) (97% - 100%)    Parameters below as of 13 Jun 2024 04:50  Patient On (Oxygen Delivery Method): BiPAP/CPAP    Physical Exam:  Gen: NAD  HEENT: EOMI, MMM  Chest:  speaking full sentences, equal chest rise  Cardiac: RR  Abd:  nondistended  Ext:  1+ edema bilaterally  Neuro: AAOX3, normal mood and affect    Labs:                        7.6    4.15  )-----------( 39       ( 12 Jun 2024 22:26 )             23.0     CBC Full  -  ( 12 Jun 2024 22:26 )  WBC Count : 4.15 K/uL  RBC Count : 2.41 M/uL  Hemoglobin : 7.6 g/dL  Hematocrit : 23.0 %  Platelet Count - Automated : 39 K/uL  Mean Cell Volume : 95.4 fl  Mean Cell Hemoglobin : 31.5 pg  Mean Cell Hemoglobin Concentration : 33.0 gm/dL    06-12    141  |  109<H>  |  43<H>  ----------------------------<  119<H>  4.9   |  18<L>  |  2.11<H>    Ca    9.4      12 Jun 2024 08:10    TPro  6.3  /  Alb  4.0  /  TBili  0.6  /  DBili  x   /  AST  18  /  ALT  26  /  AlkPhos  130<H>  06-12    PT/INR - ( 11 Jun 2024 20:43 )   PT: 10.6 sec;   INR: 0.96 ratio         PTT - ( 11 Jun 2024 20:43 )  PTT:29.5 sec  Bilirubin Total: 0.6 mg/dL (06-12-24 @ 08:10)

## 2024-06-13 NOTE — DISCHARGE NOTE NURSING/CASE MANAGEMENT/SOCIAL WORK - PATIENT PORTAL LINK FT
You can access the FollowMyHealth Patient Portal offered by [facility] by registering at the following website: [email or web address]. By joining [facility]’s FollowMyHealth portal, you will also be able to view your health information using other applications (apps) compatible with our system.

## 2024-06-13 NOTE — DISCHARGE NOTE NURSING/CASE MANAGEMENT/SOCIAL WORK - NSDCPEFALRISK_GEN_ALL_CORE
For information on Fall & Injury Prevention, visit: [email or web address] OR  [email or web address] OR  https://www.cdc.gov/steadi/patient.html

## 2024-06-13 NOTE — DISCHARGE NOTE PROVIDER - NSDCFUADDAPPT_GEN_ALL_CORE_FT
APPTS ARE READY TO BE MADE: [X] YES    Best Family or Patient Contact (if needed):    Additional Information about above appointments (if needed):    1: Follow up with PCP Dr. Gardner   2: Follow up with heme onc Dr. Ovalles  3: Follow up with nephrology Dr. Morris    Other comments or requests:    APPTS ARE READY TO BE MADE: [X] YES    Best Family or Patient Contact (if needed):    Additional Information about above appointments (if needed):    1: Follow up with PCP Dr. Gardner   2: Follow up with heme onc Dr. Ovalles  3: Follow up with nephrology Dr. Morris    Other comments or requests:       Provided patient with provider referral information, however patient prefers to schedule the appointments on their own.

## 2024-06-13 NOTE — DISCHARGE NOTE PROVIDER - PROVIDER TOKENS
PROVIDER:[TOKEN:[[id number]]],PROVIDER:[TOKEN:[[id number]],FOLLOWUP:[1 week]],PROVIDER:[TOKEN:[[id number]]]

## 2024-06-13 NOTE — DISCHARGE NOTE PROVIDER - NSDCMRMEDTOKEN_GEN_ALL_CORE_FT
amLODIPine 5 mg oral tablet: 1 tab(s) orally once a day  atorvastatin 20 mg oral tablet: 1 tab(s) orally once a day  empagliflozin 10 mg oral tablet: 1 tab(s) orally once a day  Keppra 1000 mg oral tablet: 1 tab(s) orally 2 times a day  lamoTRIgine 100 mg oral tablet: 1 tab(s) orally 2 times a day  Latuda 40 mg oral tablet: 1 tab(s) orally once a day (at bedtime)  Lexapro 10 mg oral tablet: 15 milligram(s) orally once a day  lisinopril 40 mg oral tablet: 1 tab(s) orally once a day  Neurontin 300 mg oral capsule: 1 cap(s) orally 2 times a day  rolling walker : rolling walker    amLODIPine 5 mg oral tablet: 1 tab(s) orally once a day  atorvastatin 20 mg oral tablet: 1 tab(s) orally once a day  empagliflozin 10 mg oral tablet: 1 tab(s) orally once a day  Keppra 1000 mg oral tablet: 1 tab(s) orally 2 times a day  lamoTRIgine 100 mg oral tablet: 1 tab(s) orally 2 times a day  Latuda 40 mg oral tablet: 1 tab(s) orally once a day (at bedtime)  Lexapro 10 mg oral tablet: 15 milligram(s) orally once a day  Neurontin 300 mg oral capsule: 1 cap(s) orally 2 times a day

## 2024-06-13 NOTE — PROGRESS NOTE ADULT - ASSESSMENT
Patient is a 67 year old male with PMHx of Metastatic Testicular Cancer on etoposide/cisplatin s/p L orchiectomy, Epilepsy, Schizophrenia, HTN, HLD and developmental delay. Presents to [facility] for blood transfusion after being found to be anemic to 6.4 on outpatient labs.    Plan:    # Anemia Likely 2/2 Chemo:  - No signs of bleeding noted, given all cell lines down on CBC, likely to be 2/2 chemotherapy-induced bone marrow suppression  - H/H 6.3/19.3  - WBC 3.65  - Platelets 28 - transfuse for <50k w/ bleed, <20k w/ fever and <10k  - sp 2 unit PRBC  - Monitor CBC  - Maintain active T+S  - Prbc for anemia keep Hb >  8.5  - Will obtain nutritional workup as outpatient  - Heme following    # SHAGUFTA on CKD3:  - Baseline 1.6-1.7  - Bun/Cr 44/2.20  - Monitor I/O's  - Serial Cr  - Avoid nephrotoxins  - Renally dose medications  - Continue to monitor  - Renal following    # Epilepsy:  - C/w current meds    # HLD/HTN:  - C/w CV meds  - Holding home Lisinopril 2/2 SHAGUFTA    # Metastatic Testicular Cancer:  - Had L orchiectomy  - On Etoposide/cisplatin, has finished 3 cycles, last cycle finished on 6/4  - Oncology follow-up outpatient    # Schizophrenia:  - C/w current meds    # DVT ppx:  - IPCs    DC planning      [facility]  [phone]

## 2024-06-13 NOTE — DISCHARGE NOTE NURSING/CASE MANAGEMENT/SOCIAL WORK - NSDCCRNAME_GEN_ALL_CORE_FT
[facility]       resumption of services 6/14/2024 [facility]      resumption of services 6/14/2024

## 2024-06-13 NOTE — DISCHARGE NOTE PROVIDER - CARE PROVIDERS DIRECT ADDRESSES
,DirectAddress_Unknown,DirectAddress_Unknown,[email or web address]

## 2024-06-20 ENCOUNTER — OUTPATIENT (OUTPATIENT)
Dept: OUTPATIENT SERVICES | Facility: HOSPITAL | Age: 69
LOS: 1 days | End: 2024-06-20
Payer: MEDICARE

## 2024-06-20 ENCOUNTER — RESULT REVIEW (OUTPATIENT)
Age: 69
End: 2024-06-20

## 2024-06-20 ENCOUNTER — OUTPATIENT (OUTPATIENT)
Dept: OUTPATIENT SERVICES | Facility: HOSPITAL | Age: 69
LOS: 1 days | Discharge: HOME | End: 2024-06-20

## 2024-06-20 ENCOUNTER — APPOINTMENT (OUTPATIENT)
Dept: HEMATOLOGY ONCOLOGY | Facility: CLINIC | Age: 69
End: 2024-06-20

## 2024-06-20 DIAGNOSIS — D64.9 ANEMIA, UNSPECIFIED: ICD-10-CM

## 2024-06-20 DIAGNOSIS — Z98.890 OTHER SPECIFIED POSTPROCEDURAL STATES: Chronic | ICD-10-CM

## 2024-06-20 PROCEDURE — 86077 PHYS BLOOD BANK SERV XMATCH: CPT

## 2024-06-22 ENCOUNTER — APPOINTMENT (OUTPATIENT)
Dept: INFUSION THERAPY | Facility: HOSPITAL | Age: 69
End: 2024-06-22

## 2024-06-22 PROCEDURE — 86850 RBC ANTIBODY SCREEN: CPT

## 2024-06-22 PROCEDURE — 86922 COMPATIBILITY TEST ANTIGLOB: CPT

## 2024-06-22 PROCEDURE — 86901 BLOOD TYPING SEROLOGIC RH(D): CPT

## 2024-06-22 PROCEDURE — 86902 BLOOD TYPE ANTIGEN DONOR EA: CPT

## 2024-06-22 PROCEDURE — 86880 COOMBS TEST DIRECT: CPT

## 2024-06-22 PROCEDURE — 86870 RBC ANTIBODY IDENTIFICATION: CPT

## 2024-06-22 PROCEDURE — 86900 BLOOD TYPING SEROLOGIC ABO: CPT

## 2024-06-24 DIAGNOSIS — R11.2 NAUSEA WITH VOMITING, UNSPECIFIED: ICD-10-CM

## 2024-06-24 DIAGNOSIS — Z51.89 ENCOUNTER FOR OTHER SPECIFIED AFTERCARE: ICD-10-CM

## 2024-07-08 ENCOUNTER — APPOINTMENT (OUTPATIENT)
Dept: PULMONOLOGY | Facility: CLINIC | Age: 69
End: 2024-07-08

## 2024-07-12 ENCOUNTER — APPOINTMENT (OUTPATIENT)
Dept: HEMATOLOGY ONCOLOGY | Facility: CLINIC | Age: 69
End: 2024-07-12

## 2024-07-13 ENCOUNTER — TRANSCRIPTION ENCOUNTER (OUTPATIENT)
Age: 69
End: 2024-07-13

## 2024-07-13 ENCOUNTER — APPOINTMENT (OUTPATIENT)
Dept: INFUSION THERAPY | Facility: HOSPITAL | Age: 69
End: 2024-07-13

## 2024-07-16 NOTE — H&P ADULT - NSHPLABSRESULTS_GEN_ALL_CORE
Detail Level: Zone 01-21    142  |  103  |  17  ----------------------------<  138<H>  4.0   |  18<L>  |  1.91<H>    Ca    10.3      21 Jan 2023 18:22  Phos  2.3     01-21  Mg     2.3     01-21    TPro  7.8  /  Alb  4.8  /  TBili  0.6  /  DBili  x   /  AST  42<H>  /  ALT  30  /  AlkPhos  107  01-21                            16.4   15.26 )-----------( 203      ( 21 Jan 2023 18:22 )             49.1           LIVER FUNCTIONS - ( 21 Jan 2023 18:22 )  Alb: 4.8 g/dL / Pro: 7.8 g/dL / ALK PHOS: 107 U/L / ALT: 30 U/L / AST: 42 U/L / GGT: x 01-21    142  |  103  |  17  ----------------------------<  138<H>  4.0   |  18<L>  |  1.91<H>    Ca    10.3      21 Jan 2023 18:22  Phos  2.3     01-21  Mg     2.3     01-21    TPro  7.8  /  Alb  4.8  /  TBili  0.6  /  DBili  x   /  AST  42<H>  /  ALT  30  /  AlkPhos  107  01-21                            16.4   15.26 )-----------( 203      ( 21 Jan 2023 18:22 )             49.1       LIVER FUNCTIONS - ( 21 Jan 2023 18:22 )  Alb: 4.8 g/dL / Pro: 7.8 g/dL / ALK PHOS: 107 U/L / ALT: 30 U/L / AST: 42 U/L / GGT: x    ---  CT Head No Cont (01.21.23 @ 20:39)    IMPRESSION:  1. No acute intracranial hemorrhage, territorial infarct, mass effect or   calvarial fracture.  2. No cervical spine fracture or traumatic spondylolisthesis.  3. Right thyroid lobe nodule. A nonemergent thyroid ultrasound is   recommended for further evaluation.    ---  Xray Chest 1 View AP/PA (01.21.23 @ 21:14)    IMPRESSION:  Diffuse hazy opacification of the left hemithorax, may be due to layering   pleural effusion and/or atelectasis. The right lung is clear.

## 2024-07-30 PROBLEM — C62.90 MALIGNANT NEOPLASM OF UNSPECIFIED TESTIS, UNSPECIFIED WHETHER DESCENDED OR UNDESCENDED: Chronic | Status: ACTIVE | Noted: 2024-07-11

## 2024-08-06 ENCOUNTER — APPOINTMENT (OUTPATIENT)
Dept: NUCLEAR MEDICINE | Facility: IMAGING CENTER | Age: 69
End: 2024-08-06

## 2024-08-06 ENCOUNTER — OUTPATIENT (OUTPATIENT)
Dept: OUTPATIENT SERVICES | Facility: HOSPITAL | Age: 69
LOS: 1 days | End: 2024-08-06
Payer: MEDICARE

## 2024-08-06 DIAGNOSIS — C62.12 MALIGNANT NEOPLASM OF DESCENDED LEFT TESTIS: ICD-10-CM

## 2024-08-06 DIAGNOSIS — Z98.890 OTHER SPECIFIED POSTPROCEDURAL STATES: Chronic | ICD-10-CM

## 2024-08-06 PROCEDURE — 78815 PET IMAGE W/CT SKULL-THIGH: CPT | Mod: MH

## 2024-08-06 PROCEDURE — A9552: CPT

## 2024-08-06 PROCEDURE — 78815 PET IMAGE W/CT SKULL-THIGH: CPT | Mod: 26,PS,MH

## 2024-12-10 NOTE — PATIENT PROFILE ADULT - FUNCTIONAL SCREEN CURRENT LEVEL: COMMUNICATION, MLM
Caller: Kevin Garsia    Relationship: Self    Best call back number: [phone]    Who are you requesting to speak with (clinical staff, provider,  specific staff member): DR SANDERS    What was the call regarding: PT RECEIVED A MISSED CALL FROM DR SANDERS     0 = understands/communicates without difficulty

## 2025-01-16 ENCOUNTER — INPATIENT (INPATIENT)
Facility: HOSPITAL | Age: 70
LOS: 20 days | Discharge: NURSING, REHAB OR LONG-TERM CARE | DRG: 312 | End: 2025-02-06
Attending: INTERNAL MEDICINE | Admitting: STUDENT IN AN ORGANIZED HEALTH CARE EDUCATION/TRAINING PROGRAM
Payer: MEDICARE

## 2025-01-16 VITALS
RESPIRATION RATE: 19 BRPM | SYSTOLIC BLOOD PRESSURE: 126 MMHG | WEIGHT: 229.94 LBS | OXYGEN SATURATION: 92 % | TEMPERATURE: 98 F | DIASTOLIC BLOOD PRESSURE: 76 MMHG | HEART RATE: 87 BPM | HEIGHT: 65 IN

## 2025-01-16 DIAGNOSIS — G47.33 OBSTRUCTIVE SLEEP APNEA (ADULT) (PEDIATRIC): ICD-10-CM

## 2025-01-16 DIAGNOSIS — R55 SYNCOPE AND COLLAPSE: ICD-10-CM

## 2025-01-16 DIAGNOSIS — I21.4 NON-ST ELEVATION (NSTEMI) MYOCARDIAL INFARCTION: ICD-10-CM

## 2025-01-16 DIAGNOSIS — I10 ESSENTIAL (PRIMARY) HYPERTENSION: ICD-10-CM

## 2025-01-16 DIAGNOSIS — R79.89 OTHER SPECIFIED ABNORMAL FINDINGS OF BLOOD CHEMISTRY: ICD-10-CM

## 2025-01-16 DIAGNOSIS — E78.5 HYPERLIPIDEMIA, UNSPECIFIED: ICD-10-CM

## 2025-01-16 DIAGNOSIS — Z98.890 OTHER SPECIFIED POSTPROCEDURAL STATES: Chronic | ICD-10-CM

## 2025-01-16 DIAGNOSIS — N18.30 CHRONIC KIDNEY DISEASE, STAGE 3 UNSPECIFIED: ICD-10-CM

## 2025-01-16 DIAGNOSIS — R07.9 CHEST PAIN, UNSPECIFIED: ICD-10-CM

## 2025-01-16 DIAGNOSIS — F20.9 SCHIZOPHRENIA, UNSPECIFIED: ICD-10-CM

## 2025-01-16 DIAGNOSIS — C62.90 MALIGNANT NEOPLASM OF UNSPECIFIED TESTIS, UNSPECIFIED WHETHER DESCENDED OR UNDESCENDED: ICD-10-CM

## 2025-01-16 DIAGNOSIS — Z29.9 ENCOUNTER FOR PROPHYLACTIC MEASURES, UNSPECIFIED: ICD-10-CM

## 2025-01-16 DIAGNOSIS — J10.1 INFLUENZA DUE TO OTHER IDENTIFIED INFLUENZA VIRUS WITH OTHER RESPIRATORY MANIFESTATIONS: ICD-10-CM

## 2025-01-16 LAB
ADD ON TEST-SPECIMEN IN LAB: SIGNIFICANT CHANGE UP
ALBUMIN SERPL ELPH-MCNC: 4.2 G/DL — SIGNIFICANT CHANGE UP (ref 3.3–5)
ALP SERPL-CCNC: 105 U/L — SIGNIFICANT CHANGE UP (ref 40–120)
ALT FLD-CCNC: 24 U/L — SIGNIFICANT CHANGE UP (ref 10–45)
ANION GAP SERPL CALC-SCNC: 17 MMOL/L — SIGNIFICANT CHANGE UP (ref 5–17)
AST SERPL-CCNC: 23 U/L — SIGNIFICANT CHANGE UP (ref 10–40)
BASOPHILS # BLD AUTO: 0.04 K/UL — SIGNIFICANT CHANGE UP (ref 0–0.2)
BASOPHILS NFR BLD AUTO: 0.5 % — SIGNIFICANT CHANGE UP (ref 0–2)
BILIRUB SERPL-MCNC: 0.3 MG/DL — SIGNIFICANT CHANGE UP (ref 0.2–1.2)
BLD GP AB SCN SERPL QL: POSITIVE — SIGNIFICANT CHANGE UP
BUN SERPL-MCNC: 44 MG/DL — HIGH (ref 7–23)
CALCIUM SERPL-MCNC: 9.8 MG/DL — SIGNIFICANT CHANGE UP (ref 8.4–10.5)
CHLORIDE SERPL-SCNC: 103 MMOL/L — SIGNIFICANT CHANGE UP (ref 96–108)
CK SERPL-CCNC: 121 U/L — SIGNIFICANT CHANGE UP (ref 30–200)
CO2 SERPL-SCNC: 17 MMOL/L — LOW (ref 22–31)
CREAT SERPL-MCNC: 2.79 MG/DL — HIGH (ref 0.5–1.3)
DAT C3-SP REAG RBC QL: NEGATIVE — SIGNIFICANT CHANGE UP
EGFR: 24 ML/MIN/1.73M2 — LOW
EOSINOPHIL # BLD AUTO: 0.36 K/UL — SIGNIFICANT CHANGE UP (ref 0–0.5)
EOSINOPHIL NFR BLD AUTO: 4.3 % — SIGNIFICANT CHANGE UP (ref 0–6)
FLUAV AG NPH QL: DETECTED
FLUBV AG NPH QL: SIGNIFICANT CHANGE UP
GAS PNL BLDV: SIGNIFICANT CHANGE UP
GAS PNL BLDV: SIGNIFICANT CHANGE UP
GLUCOSE SERPL-MCNC: 143 MG/DL — HIGH (ref 70–99)
HCT VFR BLD CALC: 36.6 % — LOW (ref 39–50)
HGB BLD-MCNC: 11.6 G/DL — LOW (ref 13–17)
IMM GRANULOCYTES NFR BLD AUTO: 0.8 % — SIGNIFICANT CHANGE UP (ref 0–0.9)
LYMPHOCYTES # BLD AUTO: 0.31 K/UL — LOW (ref 1–3.3)
LYMPHOCYTES # BLD AUTO: 3.7 % — LOW (ref 13–44)
MCHC RBC-ENTMCNC: 31.6 PG — SIGNIFICANT CHANGE UP (ref 27–34)
MCHC RBC-ENTMCNC: 31.7 G/DL — LOW (ref 32–36)
MCV RBC AUTO: 99.7 FL — SIGNIFICANT CHANGE UP (ref 80–100)
MONOCYTES # BLD AUTO: 0.39 K/UL — SIGNIFICANT CHANGE UP (ref 0–0.9)
MONOCYTES NFR BLD AUTO: 4.7 % — SIGNIFICANT CHANGE UP (ref 2–14)
NEUTROPHILS # BLD AUTO: 7.21 K/UL — SIGNIFICANT CHANGE UP (ref 1.8–7.4)
NEUTROPHILS NFR BLD AUTO: 86 % — HIGH (ref 43–77)
NRBC # BLD: 0 /100 WBCS — SIGNIFICANT CHANGE UP (ref 0–0)
NRBC BLD-RTO: 0 /100 WBCS — SIGNIFICANT CHANGE UP (ref 0–0)
PLATELET # BLD AUTO: 135 K/UL — LOW (ref 150–400)
POTASSIUM SERPL-MCNC: 4.5 MMOL/L — SIGNIFICANT CHANGE UP (ref 3.5–5.3)
POTASSIUM SERPL-SCNC: 4.5 MMOL/L — SIGNIFICANT CHANGE UP (ref 3.5–5.3)
PROT SERPL-MCNC: 6.6 G/DL — SIGNIFICANT CHANGE UP (ref 6–8.3)
RBC # BLD: 3.67 M/UL — LOW (ref 4.2–5.8)
RBC # FLD: 12.5 % — SIGNIFICANT CHANGE UP (ref 10.3–14.5)
RH IG SCN BLD-IMP: POSITIVE — SIGNIFICANT CHANGE UP
RSV RNA NPH QL NAA+NON-PROBE: SIGNIFICANT CHANGE UP
SARS-COV-2 RNA SPEC QL NAA+PROBE: SIGNIFICANT CHANGE UP
SODIUM SERPL-SCNC: 137 MMOL/L — SIGNIFICANT CHANGE UP (ref 135–145)
TROPONIN T, HIGH SENSITIVITY RESULT: 34 NG/L — SIGNIFICANT CHANGE UP (ref 0–51)
TROPONIN T, HIGH SENSITIVITY RESULT: 87 NG/L — HIGH (ref 0–51)
TROPONIN T, HIGH SENSITIVITY RESULT: 96 NG/L — HIGH (ref 0–51)
WBC # BLD: 8.38 K/UL — SIGNIFICANT CHANGE UP (ref 3.8–10.5)
WBC # FLD AUTO: 8.38 K/UL — SIGNIFICANT CHANGE UP (ref 3.8–10.5)

## 2025-01-16 PROCEDURE — 99223 1ST HOSP IP/OBS HIGH 75: CPT | Mod: GC

## 2025-01-16 PROCEDURE — 99285 EMERGENCY DEPT VISIT HI MDM: CPT | Mod: GC

## 2025-01-16 PROCEDURE — 86077 PHYS BLOOD BANK SERV XMATCH: CPT

## 2025-01-16 PROCEDURE — 99223 1ST HOSP IP/OBS HIGH 75: CPT

## 2025-01-16 PROCEDURE — 76377 3D RENDER W/INTRP POSTPROCES: CPT | Mod: 26

## 2025-01-16 PROCEDURE — 70450 CT HEAD/BRAIN W/O DYE: CPT | Mod: 26

## 2025-01-16 PROCEDURE — 72125 CT NECK SPINE W/O DYE: CPT | Mod: 26

## 2025-01-16 PROCEDURE — 70486 CT MAXILLOFACIAL W/O DYE: CPT | Mod: 26

## 2025-01-16 PROCEDURE — 71045 X-RAY EXAM CHEST 1 VIEW: CPT | Mod: 26

## 2025-01-16 RX ORDER — ATORVASTATIN CALCIUM 80 MG/1
20 TABLET, FILM COATED ORAL AT BEDTIME
Refills: 0 | Status: DISCONTINUED | OUTPATIENT
Start: 2025-01-16 | End: 2025-01-17

## 2025-01-16 RX ORDER — LEVETIRACETAM 750 MG/1
1000 TABLET, FILM COATED ORAL ONCE
Refills: 0 | Status: DISCONTINUED | OUTPATIENT
Start: 2025-01-16 | End: 2025-01-16

## 2025-01-16 RX ORDER — ACETAMINOPHEN 160 MG/5ML
650 SUSPENSION ORAL EVERY 6 HOURS
Refills: 0 | Status: DISCONTINUED | OUTPATIENT
Start: 2025-01-16 | End: 2025-01-17

## 2025-01-16 RX ORDER — ACETAMINOPHEN 160 MG/5ML
1000 SUSPENSION ORAL ONCE
Refills: 0 | Status: COMPLETED | OUTPATIENT
Start: 2025-01-16 | End: 2025-01-16

## 2025-01-16 RX ORDER — HEPARIN SODIUM,PORCINE 10000/ML
7500 VIAL (ML) INJECTION EVERY 12 HOURS
Refills: 0 | Status: DISCONTINUED | OUTPATIENT
Start: 2025-01-16 | End: 2025-01-17

## 2025-01-16 RX ORDER — GABAPENTIN 800 MG/1
300 TABLET ORAL
Refills: 0 | Status: DISCONTINUED | OUTPATIENT
Start: 2025-01-16 | End: 2025-01-17

## 2025-01-16 RX ORDER — LAMOTRIGINE 100 MG/1
100 TABLET ORAL ONCE
Refills: 0 | Status: COMPLETED | OUTPATIENT
Start: 2025-01-16 | End: 2025-01-16

## 2025-01-16 RX ORDER — LAMOTRIGINE 100 MG/1
100 TABLET ORAL
Refills: 0 | Status: DISCONTINUED | OUTPATIENT
Start: 2025-01-16 | End: 2025-01-17

## 2025-01-16 RX ORDER — AMLODIPINE BESYLATE 5 MG
5 TABLET ORAL DAILY
Refills: 0 | Status: DISCONTINUED | OUTPATIENT
Start: 2025-01-16 | End: 2025-01-17

## 2025-01-16 RX ORDER — ESCITALOPRAM 10 MG/1
15 TABLET, FILM COATED ORAL DAILY
Refills: 0 | Status: DISCONTINUED | OUTPATIENT
Start: 2025-01-16 | End: 2025-01-17

## 2025-01-16 RX ORDER — BACTERIOSTATIC SODIUM CHLORIDE 0.9 %
1000 VIAL (ML) INJECTION
Refills: 0 | Status: DISCONTINUED | OUTPATIENT
Start: 2025-01-16 | End: 2025-01-17

## 2025-01-16 RX ORDER — BACTERIOSTATIC SODIUM CHLORIDE 0.9 %
1000 VIAL (ML) INJECTION ONCE
Refills: 0 | Status: COMPLETED | OUTPATIENT
Start: 2025-01-16 | End: 2025-01-16

## 2025-01-16 RX ORDER — LEVETIRACETAM 750 MG/1
1000 TABLET, FILM COATED ORAL
Refills: 0 | Status: DISCONTINUED | OUTPATIENT
Start: 2025-01-16 | End: 2025-01-17

## 2025-01-16 RX ORDER — LURASIDONE HYDROCHLORIDE 80 MG/1
40 TABLET, FILM COATED ORAL AT BEDTIME
Refills: 0 | Status: DISCONTINUED | OUTPATIENT
Start: 2025-01-16 | End: 2025-01-17

## 2025-01-16 RX ADMIN — LEVETIRACETAM 1000 MILLIGRAM(S): 750 TABLET, FILM COATED ORAL at 18:30

## 2025-01-16 RX ADMIN — ACETAMINOPHEN 650 MILLIGRAM(S): 160 SUSPENSION ORAL at 18:30

## 2025-01-16 RX ADMIN — Medication 75 MILLILITER(S): at 18:31

## 2025-01-16 RX ADMIN — Medication 5 MILLIGRAM(S): at 18:30

## 2025-01-16 RX ADMIN — LAMOTRIGINE 100 MILLIGRAM(S): 100 TABLET ORAL at 08:07

## 2025-01-16 RX ADMIN — LAMOTRIGINE 100 MILLIGRAM(S): 100 TABLET ORAL at 18:30

## 2025-01-16 RX ADMIN — GABAPENTIN 300 MILLIGRAM(S): 800 TABLET ORAL at 18:30

## 2025-01-16 RX ADMIN — ACETAMINOPHEN 400 MILLIGRAM(S): 160 SUSPENSION ORAL at 09:38

## 2025-01-16 RX ADMIN — Medication 1000 MILLILITER(S): at 09:38

## 2025-01-16 RX ADMIN — LEVETIRACETAM 1000 MILLIGRAM(S): 750 TABLET, FILM COATED ORAL at 08:06

## 2025-01-16 RX ADMIN — Medication 7500 UNIT(S): at 18:30

## 2025-01-16 RX ADMIN — ATORVASTATIN CALCIUM 20 MILLIGRAM(S): 80 TABLET, FILM COATED ORAL at 21:21

## 2025-01-16 RX ADMIN — LURASIDONE HYDROCHLORIDE 40 MILLIGRAM(S): 80 TABLET, FILM COATED ORAL at 21:22

## 2025-01-16 NOTE — H&P ADULT - PROBLEM SELECTOR PLAN 3
- WIth some R sided chest pressure on arrival to ED, now resolved  - Trop trend 34 - 96 - 87  - EKG   - Cardiology consulted, pending TTE and ischemic assessment post TTE - WIth some R sided chest pressure on arrival to ED, now resolved  - Trop trend 34 - 96 - 87  - EKG NSR  - Cardiology consulted, pending TTE and ischemic assessment post TTE

## 2025-01-16 NOTE — ED ADULT NURSE NOTE - OBJECTIVE STATEMENT
69 y.o male, A&Ox4, PMH HLD, seizure, reports last seizure 3 years ago, testicular cancer, chronic back pain, pt presents to ED c/o fall. pt states he was not able to sleep last night felt like he was going to have a seizure states his arms were shaking and he could not sit still, pt states he went outside to get his mail and states the next thing he remembered was waking up on the ground with blood from his head. pt does not recall falling and unsure if he definitely had seizure. per ems states when they initially arrived on scene pt appeared postictal, pt states when he woke up he felt "groggy". pt endorses back and neck pain, pt presents with c-collar in place. pt denies chest pain, sob, N/V/D, fevers, chills. IV access established, pt safety and comfort provided.

## 2025-01-16 NOTE — ED PROVIDER NOTE - PROGRESS NOTE DETAILS
Yas Soriano MD (PGY2) Elevated tropes on blood work.  Repeat EKG shows no ischemic changes.  Patient now endorsing chest pressure.

## 2025-01-16 NOTE — H&P ADULT - PROBLEM SELECTOR PLAN 10
- DVT ppx: heparin  - Diet: DASH/TLC  - Full Code, sister Ester is primary contact  - Discussed with ACP

## 2025-01-16 NOTE — H&P ADULT - NSHPREVIEWOFSYSTEMS_GEN_ALL_CORE
Review of Systems:   CONSTITUTIONAL: No fever, weight loss  EYES: No eye pain, visual disturbances, or discharge  ENMT:  No difficulty hearing, tinnitus, vertigo; No sinus or throat pain  RESPIRATORY: No SOB. No cough, wheezing, chills or hemoptysis  CARDIOVASCULAR: No chest pain, palpitations, dizziness, or leg swelling  GASTROINTESTINAL: No abdominal or epigastric pain. No nausea, vomiting, or hematemesis; No diarrhea or constipation. No melena or hematochezia.  GENITOURINARY: No dysuria, frequency, hematuria, or incontinence  NEUROLOGICAL: (+) slight HA, (+) seizure, no memory loss, loss of strength, numbness, or tremors  SKIN: No itching, burning, rashes, or lesions   MUSCULOSKELETAL: No joint pain or swelling; No muscle, back pain  PSYCHIATRIC: No depression, anxiety, mood swings, or difficulty sleeping

## 2025-01-16 NOTE — H&P ADULT - NSICDXPASTMEDICALHX_GEN_ALL_CORE_FT
PAST MEDICAL HISTORY:  History of epilepsy     Hypertension, unspecified type     Obstructive sleep apnea     Other hyperlipidemia     Paranoid schizophrenia     Testicular cancer     
normal...

## 2025-01-16 NOTE — H&P ADULT - PROBLEM SELECTOR PLAN 1
- Patient with hx of epilepsy, generalized grand mal seizures on lamictal 100 mg BID, keppra 1g BID, neurontin 300 mg BID  - Reports seizure today while ambulating to mailbox this AM, unclear duration, hit head, notes adherence with medications  - CTH/C spine/MF negative for acute ICH, notable for mild R hematoma  - Given AM dose of seizure medications on arrival, c/w home doses for now  - Check keppra level  - Neurology consulted; may require EEG, awaiting formal recs

## 2025-01-16 NOTE — CONSULT NOTE ADULT - SUBJECTIVE AND OBJECTIVE BOX
Neurology - Consult Note    -  Spectra: [phone] ([facility]), [phone] ([facility])  -    HPI: Patient OSCAR MILAN is a 69y (1955) with a PMHx significant for Epilepsy (febrile sz in childhood, GTC in adulthood, on Keppra, Lamictal and Gabapentin), metastatic testicular cancer on etoposide/cisplatin chemo, s/p L orchidectomy, hyperparathyroidism, obesity, VAZQUEZ, schizophrenia comes in after event of LOC in AM. At around 0500, he was near the mailbox, he was up since 0200, was feeling tired, next thing he remembers is being on floor, his neighbor asking him to keep laying on the floor, he was feeling a bit groggy but remembers going back into his apartment being transported by the EMS, and even gave sister's phone number to EMS. Unclear time period. He denies any tongue bite, urinary or bowel incontinence. He did hit his head and has some bruises but denies any pain. Previous episode, as witnessed by sister was 2023, when she noticed head turn, screaming sound, whole body stiffening, was due to medication(lamotrigine) down titration, was sent home on current dose, has been compliant since. He has been following with Dr. Lopez. He is on Keppra 1g BID, Lamotrigine 100mg BID, Gabapentin 300mg BID. No recent med changes.    All other review of systems is negative unless indicated above.    Allergies:  latex (Rash)  penicillin (Hives)  seasonal allergies (Unknown)      PMHx/PSHx/Family Hx: As above, otherwise see below   Hypertension, unspecified type    Other hyperlipidemia    History of epilepsy    Paranoid schizophrenia    Obstructive sleep apnea    Testicular cancer        Social Hx:  No current use of tobacco, alcohol, or illicit drugs    Medications:  MEDICATIONS  (STANDING):  amLODIPine   Tablet 5 milliGRAM(s) Oral daily  atorvastatin 20 milliGRAM(s) Oral at bedtime  escitalopram 15 milliGRAM(s) Oral daily  gabapentin 300 milliGRAM(s) Oral two times a day  heparin   Injectable 7500 Unit(s) SubCutaneous every 12 hours  lamoTRIgine 100 milliGRAM(s) Oral two times a day  levETIRAcetam 1000 milliGRAM(s) Oral two times a day  lisinopril 5 milliGRAM(s) Oral daily  lurasidone 40 milliGRAM(s) Oral at bedtime  sodium chloride 0.9%. 1000 milliLiter(s) (75 mL/Hr) IV Continuous <Continuous>    MEDICATIONS  (PRN):  acetaminophen     Tablet .. 650 milliGRAM(s) Oral every 6 hours PRN Temp greater or equal to 38C (100.4F), Mild Pain (1 - 3)      --------------------------------------------------------------------------------------------------------------------------------------------------------------------------------------------------------------------    Vitals:  T(C): 37.1 (01-16-25 @ 16:51), Max: 37.7 (01-16-25 @ 16:29)  HR: 75 (01-16-25 @ 16:51) (68 - 87)  BP: 157/84 (01-16-25 @ 16:51) (126/76 - 157/84)  RR: 16 (01-16-25 @ 16:51) (16 - 20)  SpO2: 94% (01-16-25 @ 16:51) (92% - 95%)    PHYSICAL EXAM:     General - Obese, NAD. bruises on left forehead, right face  Cardiovascular - Peripheral pulses palpable, no edema    NEURO:    Mental status - Awake, Alert, Oriented to person, place, and time. Speech fluent, repetition and naming intact. Follows simple and complex commands.    Cranial nerves -   PERRL, VFF, EOMI,   face sensation (V1-V3) intact b/l,   facial strength intact without asymmetry b/l,   palate with symmetric elevation,   trapezius 5/5 strength b/l,   tongue midline on protrusion with full lateral movement    Motor - Normal bulk and tone throughout. No pronator drift.  Strength testing            Deltoid      Biceps      Triceps     Wrist Extension    Wrist Flexion     Interossei         R            5                 5               5                     5                              5                        5                 5  L             5                 5               5                     5                              5                        5                 5              Hip Flexion    Hip Extension    Knee Flexion    Knee Extension    Dorsiflexion    Plantar Flexion  R              5                           5                       5                           5                            5                          5  L              5                           5                        5                           5                            5                          5    Sensation - Light touch intact throughout    DTR's -  0 throughout, plantars mute    Coordination - Finger to Nose intact b/l. Mild tremors appreciated in LUE    Gait and station - unable to test  ---------------------------------------------------------------------------------------------------------------------------------------------------------------------------------------------------------------------------    Labs:                        11.6   8.38  )-----------( 135      ( 16 Jan 2025 07:30 )             36.6     01-16    137  |  103  |  44[H]  ----------------------------<  143[H]  4.5   |  17[L]  |  2.79[H]    Ca    9.8      16 Jan 2025 07:30    TPro  6.6  /  Alb  4.2  /  TBili  0.3  /  DBili  x   /  AST  23  /  ALT  24  /  AlkPhos  105  01-16    CAPILLARY BLOOD GLUCOSE      POCT Blood Glucose.: 119 mg/dL (16 Jan 2025 07:35)    LIVER FUNCTIONS - ( 16 Jan 2025 07:30 )  Alb: 4.2 g/dL / Pro: 6.6 g/dL / ALK PHOS: 105 U/L / ALT: 24 U/L / AST: 23 U/L / GGT: x               CSF:                  Radiology:  CT Head No Cont:  (16 Jan 2025 11:15)    CT brain:  No acute intracranial hemorrhage, brain edema, or mass effect.  No displaced calvarial fracture.    CT cervical spine:  No acute fracture or traumatic subluxation.  No prevertebral soft tissue swelling.  Degenerative changes.    CT maxillofacial bones:  No acute fracture or traumatic subluxation.    Air-fluid level in the bilateral maxillary sinuses, correlate for the   presence of acute sinusitis.    Intraorbital contents unremarkable.    Relatively mild right supraorbital and premaxillary/buccal soft tissue   swelling/hematomas

## 2025-01-16 NOTE — H&P ADULT - PROBLEM SELECTOR PLAN 9
- Hx of metastatic testicular cancer, s/p L orchiectomy, on etoposide/cisplatin chemo, last dose on 6/21/24  - Note clear recent PET scan  - outpt Oncology follow-up.

## 2025-01-16 NOTE — CONSULT NOTE ADULT - SUBJECTIVE AND OBJECTIVE BOX
CC: fall    HPI: 69M w h/o seizures, CKD, HTN here after fall with c/f seizures. + LOC and now with CP and tenderness. Denies SOB, LH, dizziness.     Allergies  penicillin (Hives)  seasonal allergies (Unknown)  Intolerances  latex (Rash)    PAST MEDICAL & SURGICAL HISTORY:  Hypertension, unspecified type  Other hyperlipidemia  History of epilepsy  Paranoid schizophrenia  Obstructive sleep apnea  Testicular cancer  H/O Spinal surgery    FAMILY HISTORY: nc  Social History: nc      MEDS:  Home Medications:  atorvastatin 20 mg oral tablet: 1 tab(s) orally once a day (11 Jul 2024 09:03)  Claritin 10 mg oral tablet: 1 tab(s) orally once a day (in the afternoon) (11 Jul 2024 08:55)  Keppra 1000 mg oral tablet: 1 tab(s) orally 2 times a day (11 Jul 2024 09:03)  Latuda 40 mg oral tablet: 1 tab(s) orally once a day (at bedtime) (11 Jul 2024 09:03)  Lexapro 10 mg oral tablet: 1 tab(s) orally once a day +5mg tablet; TOTAL: 15mg (11 Jul 2024 08:57)  Lexapro 5 mg oral tablet: 1 tab(s) orally once a day +10mg; TOTA: 15mg (11 Jul 2024 08:57)  Neurontin 300 mg oral capsule: 1 cap(s) orally 2 times a day (11 Jul 2024 09:03)  Senna 8.6 mg oral tablet: 1 tab(s) orally as needed (11 Jul 2024 09:01)  Tylenol 325 mg oral tablet: 1 tab(s) orally as needed (11 Jul 2024 09:01)      MEDICATIONS  (STANDING):    MEDICATIONS  (PRN):      REVIEW OF SYSTEMS:  CONSTITUTIONAL: No fever, weight loss, or fatigue  EYES: No eye pain, visual disturbances, or discharge  ENMT:  No difficulty hearing, tinnitus, vertigo; No sinus or throat pain  NECK: No pain or stiffness  RESPIRATORY: No cough, wheezing, chills or hemoptysis; No Shortness of Breath  CARDIOVASCULAR: No chest pain, palpitations, passing out, dizziness, or leg swelling  GASTROINTESTINAL: No abdominal or epigastric pain. No nausea, vomiting, or hematemesis; No diarrhea or constipation. No melena or hematochezia.  GENITOURINARY: No dysuria, frequency, hematuria, or incontinence  NEUROLOGICAL: No headaches, memory loss, loss of strength, numbness, or tremors  SKIN: No itching, burning, rashes, or lesions   LYMPH Nodes: No enlarged glands  ENDOCRINE: No heat or cold intolerance; No hair loss  MUSCULOSKELETAL: No joint pain or swelling; No muscle, back, or extremity pain  PSYCHIATRIC: No depression, anxiety, mood swings, or difficulty sleeping  HEME/LYMPH: No easy bruising, or bleeding gums  ALLERY AND IMMUNOLOGIC: No hives or eczema	    [x] All others negative	    PHYSICAL EXAM:  Vital Signs Last 24 Hrs  T(C): 37 (16 Jan 2025 07:20), Max: 37 (16 Jan 2025 07:20)  T(F): 98.6 (16 Jan 2025 07:20), Max: 98.6 (16 Jan 2025 07:20)  HR: 68 (16 Jan 2025 13:17) (68 - 87)  BP: 141/75 (16 Jan 2025 13:17) (126/76 - 141/75)  BP(mean): --  RR: 20 (16 Jan 2025 13:17) (19 - 20)  SpO2: 94% (16 Jan 2025 13:17) (92% - 95%)    Parameters below as of 16 Jan 2025 13:17  Patient On (Oxygen Delivery Method): room air        Daily Height in cm: 165.1 (16 Jan 2025 06:53)    Daily     Appearance: Normal	  HEENT:   Normal oral mucosa, PERRL, EOMI	  Lymphatic: No lymphadenopathy  Cardiovascular: Normal S1 S2, No JVD, II/VI TIFFANY, No edema  Respiratory: Lungs clear to auscultation	  Psychiatry: A & O x 3, Mood & affect appropriate  Gastrointestinal:  Soft, Non-tender, + BS	  Skin: No rashes, No ecchymoses, No cyanosis	  Neurologic: Non-focal  Extremities: Normal range of motion, No clubbing, cyanosis or edema  Vascular: Peripheral pulses palpable 2+ bilaterally    LABS:	 	  I&O's Summary                                11.6   8.38  )-----------( 135      ( 16 Jan 2025 07:30 )             36.6     01-16    137  |  103  |  44[H]  ----------------------------<  143[H]  4.5   |  17[L]  |  2.79[H]    Ca    9.8      16 Jan 2025 07:30    TPro  6.6  /  Alb  4.2  /  TBili  0.3  /  DBili  x   /  AST  23  /  ALT  24  /  AlkPhos  105  01-16      Creatinine Trend: 2.79<--  	  ASSESSMENT/PLAN: 69M w h/o seizures, CKD, HTN here after fall with c/f seizures. + LOC and now with CP and tenderness.   -tele  -trop trend 34 - 96 -87  -seizure workup ongoing  -cont statin, start asa  -ecg  -TTE  -ischemic assessment pending after TTE      55 minutes were spent on this encounter for extensive review of medical record details including labs and/or imaging studies and/or adjacent care team and consultant records, as well as review and reconciliation of current medications. Time was spent on obtaining a history, performing physical examination of patient, and answering patient and/or family questions regarding plan of care. Time was also spent discussing plan of care with patient’s other care team members including primary and consulting teams. Time also was spent on documentation of this encounter into the EHR.    ***    Luis Antonio Walter MD, MPhil, [facility]  Cardiologist, [facility]  ; [facility] [employer] at hospitals/[facility]  email: [email or web address] on-service contact/call information, go to [email or web address] and use "Network Hardware Resale" to login.  Outpatient Cardiology appointments, call  [phone] to arrange with a colleague; I do not have outpatient Cardiology clinic.

## 2025-01-16 NOTE — ED ADULT NURSE NOTE - SEPSIS REFERENCE DATA CRITERIA 2
[facility]  Discharge- Walter Roland 1993, 31 y o  male MRN: [id number]  Unit/Bed#: W -01 Encounter: [id number]  Primary Care Provider: PRIMO Ortiz   Date and time admitted to hospital: 4/4/2023  9:59 AM    * Dental abscess  Assessment & Plan  CT - 1 3 x 0 8 cm subperiosteal abscess along right maxillary alveolar ridge adjacent to periodontal disease of maxillary right 1st-2nd premolar teeth with facial cellulitis  Pt failed outpatient antibiotics - was also seen by oral surgeon yesterday - pain and swelling worsened today so presented to ED  Patient was evaluated by OMFS  Recommend outpatient follow-up in clinic tomorrow morning for surgical intervention  Patient can be discharged from their standpoint  Discussed with the patient he also prefers to go home tonight  So patient to be discharged home on oral Augmentin  Instructions given for outpatient follow-up tomorrow morning for OMFS intervention  Sleep apnea  Assessment & Plan  At bedtime CPAP        Discharging Physician / Practitioner: Marley Stringer MD  PCP: Maricruz Flores [address] [place] [address]  Admission Date:   Admission Orders (From admission, onward)     Ordered        04/04/23 1333  INPATIENT ADMISSION  Once                      Discharge Date: 04/04/23    Medical Problems     Resolved Problems  Date Reviewed: 2/15/2023   None             Reason for Admission: Dental abscess/facial cellulitis    Hospital Course:     Walter Roland is a 31 y o  male patient who originally presented to the hospital on 4/4/2023 due to dental abscess/facial cellulitis  Failed outpatient treatment  Was admitted for OMFS evaluation  Was started on IV antibiotic  Was eval by OMFS who recommended outpatient follow-up in the clinic tomorrow morning for intervention  No acute in patient interventions  Okay for discharge from their standpoint      The patient, initially admitted to the hospital as inpatient, was discharged earlier than expected given the following: [place] [facility]  [place]ancesca Razo Please see above list of diagnoses and related plan for additional information  Condition at Discharge: fair     Discharge Day Visit / Exam:     * Please refer to separate progress note for these details *    Discussion with Family: pt    Discharge instructions/Information to patient and family:   See after visit summary for information provided to patient and family  Provisions for Follow-Up Care:  See after visit summary for information related to follow-up care and any pertinent home health orders  Disposition:     Home    For Discharges to [facility] SNF:   · Not Applicable to this Patient - Not Applicable to this Patient    Planned Readmission: no     Discharge Statement:  I spent 35 minutes discharging the patient  This time was spent on the day of discharge  I had direct contact with the patient on the day of discharge  Greater than 50% of the total time was spent examining patient, answering all patient questions, arranging and discussing plan of care with patient as well as directly providing post-discharge instructions  Additional time then spent on discharge activities  Discharge Medications:  See after visit summary for reconciled discharge medications provided to patient and family        ** Please Note: This note has been constructed using a voice recognition system ** Abnormal Lactate: > 2

## 2025-01-16 NOTE — H&P ADULT - HISTORY OF PRESENT ILLNESS
69M w/ hx of metastatic testicular cancer (s/p L orchiectomy, on etoposide/cisplatin chemo, last dose 6/2024), epilepsy (grand-mal seizures), schizophrenia, developmental delay, HTN, HLD, VAZQUEZ, stage III CKD, severe obesity, secondary hyperparathyroidism, anemia, thrombocytopenia who presented to ED for fall, possible seizure prior. Sister at bedside providing history as well. Patient reports last seizure 3 years ago, is adherent with medications (on lamotrigine and keppra), states he believes he had a seizure at home while ambulating to get mail from his mailbox. States he doesn't typically have an aura, just 'blacks out' for about 5 minutes. Notes this time was found by neighbor with abrasion to forehead, R cheek. Unsure time down, denies tongue-biting, b/b incontinence. Normally ambulates with cane. Per ED triage note, on EMS arrival, appeared in post-ictal state. Endorsing R sided chest pressure, and neck tenderness post fall. States has been in normal state of health, eating/drinking well, did have some hot flashes/chills last night, but did not take temperature. Denies any f/c, cough, SOB, rhinorrhea, sore throat, or any other acute sx.     In ED received 100 mg lamotrigine, keppra 1g. CTH/C spine/MF largely unremarkable apart from  mild right supraorbital and premaxillary/buccal soft tissue swelling/hematomas. CXR without focal consolidation. CBC and BMP largely unremarkable; noted to be influenza positive. Trop elevated for which cardiology was consulted; recommended TTE. Medicine called for admission.

## 2025-01-16 NOTE — CONSULT NOTE ADULT - ASSESSMENT
Assessment:  Patient OSCAR MILAN is a 69y (1955) with a PMHx significant for Epilepsy (febrile sz in childhood, GTC in adulthood, on Keppra, Lamictal and Gabapentin), metastatic testicular cancer on etoposide/cisplatin chemo, s/p L orchidectomy, hyperparathyroidism, obesity, VAZQUEZ, schizophrenia comes in after event of LOC in AM. Witnessed by neighbor, unclear description, unclear time period, no perceived aura, mild post event confusion, has head trauma, but no tongue bite, incontinence. Following with Dr. Lopez. He is on Keppra 1g BID, Lamotrigine 100mg BID, Gabapentin 300mg BID. No recent med changes. He is tested Flu +ve. He mentions poor po intake. Exam unremarkabel. CTH noraml except for soft tissue hematomas.    Impression: Unclear episode of LOC. Rule out syncope vs seizure.     Recs:  -orthostatic vitals  -Syncopal workup as per primary team  -Rule out post ictal activity with rEEG  -Consider MRI brain w and wo contrast to look for potential seizure foci that could cause increased frequency especially given hx of metastatic testicular cancer  -continue home ASM Keppra, Lamotrigine, Gabapentin  -rescue medications for GTC >3min and/or unstable vitals: ativan 2mg IV  -rest of care as per primary team    Case to be discussed and seen with attending in AM.

## 2025-01-16 NOTE — CONSULT NOTE ADULT - ATTENDING COMMENTS
HPI as per resident note, personally verified by me. Patient had an episode of LOC early with headstrike to R forehead in the AM on 1/16. He does not recall any of the event and is unsure if there were any convulsions. He denied any tongue/cheek biting or bowel/bladder incontinence and has been compliant with all ASM's. Found to have influenza A infection. No reported focal neurologic deficits or abnormal movements and he feels back to neurologic baseline.    Neurologic exam as per resident note with additions as below:  AAO x3, speech fluent  CN's II-XII intact  Strength 5/5 all  Sens intact all  FtN intact b/l. Postural tremor of BUE's noted  Downgoing b/l plantar response    < from: CT Head No Cont (01.16.25 @ 11:15) >    CT brain:  No acute intracranial hemorrhage, brain edema, or mass effect.  No displaced calvarial fracture.    CT cervical spine:  No acute fracture or traumatic subluxation.  No prevertebral soft tissue swelling.  Degenerative changes.    CT maxillofacial bones:  No acute fracture or traumatic subluxation.    Air-fluid level in the bilateral maxillary sinuses, correlate for the   presence of acute sinusitis.    Intraorbital contents unremarkable.    Relatively mild right supraorbital and premaxillary/buccal soft tissue   swelling/hematomas    < end of copied text >      A/P:  Syncope  Influenza A infection  Epilepsy, intractable, without status epilepticus  Metastatic testicular cancer  VAZQUEZ  Schizophrenia  HTN    - Patient with syncopal event in the setting of influenza A infection, likely from toxic/metabolic or peripheral process with acute medical issues but could also be cardiac. Seizure cannot be entirely excluded but much lower on the differential; if it did happen would favor provoked in the above setting. He is back to neurologic baseline without any recurrent events, focal neurologic deficits, or abnormal movements. CT head and CT c-spine, personally reviewed by me, with cervical degenerative changes but no acute intracranial or spinal findings  - No EEG or MRI brain needed from neurologic standpoint as it would not change current management. Defer to oncology if MRI brain requested given history of metastatic testicular cancer  - Continue current ASM's  - Orthostatic vital signs  - No neurologic contraindications to discharge when patient is medically stable. He should follow-up with outpatient neurologist Dr. Ronda Lopez ([phone])  - Continue to address above medical problems, as you are doing  - Will sign off, please call [phone]) with additional questions or concerns

## 2025-01-16 NOTE — ED PROVIDER NOTE - PHYSICAL EXAMINATION
Const: Awake, alert, no acute distress.  Well appearing.  Moving comfortably on stretcher.  HEENT: Right sided forehead abrasion. No lacerations.  Moist mucous membranes.  No pharyngeal erythema, no exudates. No ttp over maxilla, zygoma, orbital rim.   Eyes: Extraocular movements intact b/l.  Conjunctiva pink.  No scleral icterus.  Neck: Midline cervical ttp.   Cardiac: Regular rate and regular rhythm. S1 S2 present.    Resp: Speaking in full sentences. No evidence of respiratory distress.  Breath sounds clear to auscultation b/l. Normal chest excursion.   Abd: Non-distended, no overlying skin changes.  Soft, non-tender, no guarding, no rigidity, no rebound tenderness.  No palpable masses.  No bruising.  Skin: Normal coloration.  No rashes, abrasions or lacerations.  Neuro: Awake, alert & oriented x 3.  Moves all extremities spontaneously.  No focal deficits.

## 2025-01-16 NOTE — H&P ADULT - PROBLEM SELECTOR PLAN 4
- Hx of CKD stage 3, unclear baseline, past Cr fluctuate from 2.1-2.7 on admissions  - Cr of 2.7 on admission today, continue to monitor, dose for GFR, avoid nephrotoxic meds

## 2025-01-16 NOTE — H&P ADULT - PROBLEM SELECTOR PLAN 2
- Found to be influenza A positive on admission  - Asymptomatic, on RA, afebrile with on leukocytosis  - Unclear onset, likely out of window for tamiflu and sister/pt prefer not to receive at this time as well   - Supportive management at this time

## 2025-01-16 NOTE — H&P ADULT - NSHPPHYSICALEXAM_GEN_ALL_CORE
Vital Signs Last 24 Hrs  T(C): 37.1 (16 Jan 2025 16:51), Max: 37.7 (16 Jan 2025 16:29)  T(F): 98.8 (16 Jan 2025 16:51), Max: 99.8 (16 Jan 2025 16:29)  HR: 75 (16 Jan 2025 16:51) (68 - 87)  BP: 157/84 (16 Jan 2025 16:51) (126/76 - 157/84)  BP(mean): --  RR: 16 (16 Jan 2025 16:51) (16 - 20)  SpO2: 94% (16 Jan 2025 16:51) (92% - 95%)    Parameters below as of 16 Jan 2025 16:51  Patient On (Oxygen Delivery Method): room air      CONSTITUTIONAL: Well-developed, well-groomed, in no apparent distress  EYES: No conjunctival or scleral injection, non-icteric; PERRLA and symmetric  ENMT: oral mucosa with moist membranes. abrasion to R forehead, R cheek with area of ecchymosis, slight swelling  RESPIRATORY: Breathing comfortably; lungs CTA without wheeze/rhonchi/rales  CARDIOVASCULAR: +S1S2, RRR, no M/G/R; pedal pulses full and symmetric; no lower extremity edema  GASTROINTESTINAL: No palpable masses or tenderness, +BS throughout, no rebound/guarding  MUSCULOSKELETAL: No digital clubbing or cyanosis  SKIN: No rashes or ulcers noted  NEUROLOGIC: CN II-XII intact; sensation intact in LEs b/l to light touch, strength intact to BUE/BLE  PSYCHIATRIC: A+O x 3; mood and affect appropriate

## 2025-01-16 NOTE — H&P ADULT - ASSESSMENT
69M w/ hx of metastatic testicular cancer (s/p L orchiectomy, on etoposide/cisplatin chemo), epilepsy (grand-mal seizures), schizophrenia, developmental delay, HTN, HLD, VAZQUEZ, stage III CKD, severe obesity, secondary hyperparathyroidism, anemia, thrombocytopenia who presented to ED for fall, possible seizure prior.

## 2025-01-16 NOTE — H&P ADULT - NSHPLABSRESULTS_GEN_ALL_CORE
LABS:                         11.6   8.38  )-----------( 135      ( 16 Jan 2025 07:30 )             36.6     01-16    137  |  103  |  44[H]  ----------------------------<  143[H]  4.5   |  17[L]  |  2.79[H]    Ca    9.8      16 Jan 2025 07:30    TPro  6.6  /  Alb  4.2  /  TBili  0.3  /  DBili  x   /  AST  23  /  ALT  24  /  AlkPhos  105  01-16      Urinalysis Basic - ( 16 Jan 2025 07:30 )    Color: x / Appearance: x / SG: x / pH: x  Gluc: 143 mg/dL / Ketone: x  / Bili: x / Urobili: x   Blood: x / Protein: x / Nitrite: x   Leuk Esterase: x / RBC: x / WBC x   Sq Epi: x / Non Sq Epi: x / Bacteria: x      Records reviewed from prior hospitalization. ED CT imaging and CXR reviewed.

## 2025-01-16 NOTE — ED PROVIDER NOTE - CLINICAL SUMMARY MEDICAL DECISION MAKING FREE TEXT BOX
69-year-old male with history of grand mal seizures, CKD, HTN presents emergency department after an unwitnessed fall.  Patient believes he had a seizure prior to the fall.  Patient denies any dizziness, chest pain, or before the fall.  Patient states he was heading to check his mail from his mailbox.  He endorses LOC.  Patient denies urinary incontinence, tongue biting.  Patient denies nonadherence to medications.  He is on Lamictal 100 and Keppra 1000.  Patient is not endorsing right-sided chest wall tenderness.  Cervical neck tenderness to palpation, however moving all extremities spontaneously.  DDx includes ICH, fractures.  Dispo pending labs, imaging and reassessment. 69-year-old male with history of grand mal seizures, CKD, HTN presents emergency department after an unwitnessed fall.  Patient believes he had a seizure prior to the fall.  Patient denies any dizziness, chest pain, or before the fall.  Patient states he was heading to check his mail from his mailbox.  He endorses LOC.  Patient denies urinary incontinence, tongue biting.  Patient denies nonadherence to medications.  He is on Lamictal 100 and Keppra 1000.  Patient is not endorsing right-sided chest wall tenderness.  Cervical neck tenderness to palpation, however moving all extremities spontaneously.  DDx includes ICH, fractures.  Dispo pending labs, imaging and reassessment.    Oscar: 69 year old male with history of grand mal seizures, last seizure 3 years ago, here with s/p unwitnessed fall. Patient walked to mailbox this am, had "aura" and thinks he had a seizure. normally walks with cane, was nto walking with cane, compliant with seizure medications, + loc, PE: att exam: patient awake alert NAD. + right frontal abrasion, dry blood from nose, eomi, no ttp over maxilla, + ttp over right forehead, poor dentition, no imdline c-spine tenderness, + tpsine tenderess (chronic per patient),  LUNGS CTAB no wheeze no crackle. CARD RRR no m/r/g. no chest wall ttp,   Abdomen soft NT ND no rebound no guarding no CVA tenderness. EXT WWP no edema no calf tenderness CV 2+DP/PT bilaterally. neuro A&Ox3, no focal deficits, moving all extremities, pelvis stable.   plan: will get labs, AED levels, ct head/cspine imaging to r/o trauma give facial abrasion sand swellng, + loc. will keep on cadriac monitor, reassess. will give iv bolus of aed meds.

## 2025-01-16 NOTE — H&P ADULT - NSHPSOCIALHISTORY_GEN_ALL_CORE
Former smoker (smoked for 22 yrs, quit 14 yrs ago). Lives alone and has HHA. Ambulates with cane at baseline.

## 2025-01-17 ENCOUNTER — RESULT REVIEW (OUTPATIENT)
Age: 70
End: 2025-01-17

## 2025-01-17 LAB
ADD ON TEST-SPECIMEN IN LAB: SIGNIFICANT CHANGE UP
ALBUMIN SERPL ELPH-MCNC: 3.7 G/DL — SIGNIFICANT CHANGE UP (ref 3.3–5)
ALBUMIN SERPL ELPH-MCNC: 4.6 G/DL — SIGNIFICANT CHANGE UP (ref 3.3–5)
ALP SERPL-CCNC: 110 U/L — SIGNIFICANT CHANGE UP (ref 40–120)
ALP SERPL-CCNC: 139 U/L — HIGH (ref 40–120)
ALT FLD-CCNC: 52 U/L — HIGH (ref 10–45)
ALT FLD-CCNC: 61 U/L — HIGH (ref 10–45)
ANION GAP SERPL CALC-SCNC: 14 MMOL/L — SIGNIFICANT CHANGE UP (ref 5–17)
ANION GAP SERPL CALC-SCNC: 15 MMOL/L — SIGNIFICANT CHANGE UP (ref 5–17)
ANION GAP SERPL CALC-SCNC: 24 MMOL/L — HIGH (ref 5–17)
APTT BLD: 23.9 SEC — LOW (ref 24.5–35.6)
APTT BLD: 40.6 SEC — HIGH (ref 24.5–35.6)
AST SERPL-CCNC: 66 U/L — HIGH (ref 10–40)
AST SERPL-CCNC: 75 U/L — HIGH (ref 10–40)
BASOPHILS # BLD AUTO: 0.03 K/UL — SIGNIFICANT CHANGE UP (ref 0–0.2)
BASOPHILS NFR BLD AUTO: 0.2 % — SIGNIFICANT CHANGE UP (ref 0–2)
BILIRUB SERPL-MCNC: 0.3 MG/DL — SIGNIFICANT CHANGE UP (ref 0.2–1.2)
BILIRUB SERPL-MCNC: 0.4 MG/DL — SIGNIFICANT CHANGE UP (ref 0.2–1.2)
BUN SERPL-MCNC: 32 MG/DL — HIGH (ref 7–23)
BUN SERPL-MCNC: 32 MG/DL — HIGH (ref 7–23)
BUN SERPL-MCNC: 33 MG/DL — HIGH (ref 7–23)
CALCIUM SERPL-MCNC: 8.5 MG/DL — SIGNIFICANT CHANGE UP (ref 8.4–10.5)
CALCIUM SERPL-MCNC: 9.2 MG/DL — SIGNIFICANT CHANGE UP (ref 8.4–10.5)
CALCIUM SERPL-MCNC: 9.4 MG/DL — SIGNIFICANT CHANGE UP (ref 8.4–10.5)
CHLORIDE SERPL-SCNC: 101 MMOL/L — SIGNIFICANT CHANGE UP (ref 96–108)
CHLORIDE SERPL-SCNC: 102 MMOL/L — SIGNIFICANT CHANGE UP (ref 96–108)
CHLORIDE SERPL-SCNC: 103 MMOL/L — SIGNIFICANT CHANGE UP (ref 96–108)
CK MB BLD-MCNC: 0.5 % — SIGNIFICANT CHANGE UP (ref 0–3.5)
CK MB CFR SERPL CALC: 4.6 NG/ML — SIGNIFICANT CHANGE UP (ref 0–6.7)
CK SERPL-CCNC: 1015 U/L — HIGH (ref 30–200)
CO2 SERPL-SCNC: 14 MMOL/L — LOW (ref 22–31)
CO2 SERPL-SCNC: 16 MMOL/L — LOW (ref 22–31)
CO2 SERPL-SCNC: 20 MMOL/L — LOW (ref 22–31)
CREAT SERPL-MCNC: 2.27 MG/DL — HIGH (ref 0.5–1.3)
CREAT SERPL-MCNC: 2.55 MG/DL — HIGH (ref 0.5–1.3)
CREAT SERPL-MCNC: 2.56 MG/DL — HIGH (ref 0.5–1.3)
EGFR: 27 ML/MIN/1.73M2 — LOW
EGFR: 27 ML/MIN/1.73M2 — LOW
EGFR: 31 ML/MIN/1.73M2 — LOW
EOSINOPHIL # BLD AUTO: 0.01 K/UL — SIGNIFICANT CHANGE UP (ref 0–0.5)
EOSINOPHIL NFR BLD AUTO: 0.1 % — SIGNIFICANT CHANGE UP (ref 0–6)
GAS PNL BLDA: SIGNIFICANT CHANGE UP
GLUCOSE BLDC GLUCOMTR-MCNC: 124 MG/DL — HIGH (ref 70–99)
GLUCOSE BLDC GLUCOMTR-MCNC: 150 MG/DL — HIGH (ref 70–99)
GLUCOSE SERPL-MCNC: 116 MG/DL — HIGH (ref 70–99)
GLUCOSE SERPL-MCNC: 171 MG/DL — HIGH (ref 70–99)
GLUCOSE SERPL-MCNC: 174 MG/DL — HIGH (ref 70–99)
HCT VFR BLD CALC: 36.2 % — LOW (ref 39–50)
HCT VFR BLD CALC: 38.5 % — LOW (ref 39–50)
HCT VFR BLD CALC: 43.8 % — SIGNIFICANT CHANGE UP (ref 39–50)
HGB BLD-MCNC: 12 G/DL — LOW (ref 13–17)
HGB BLD-MCNC: 12.5 G/DL — LOW (ref 13–17)
HGB BLD-MCNC: 14.4 G/DL — SIGNIFICANT CHANGE UP (ref 13–17)
IMM GRANULOCYTES NFR BLD AUTO: 0.6 % — SIGNIFICANT CHANGE UP (ref 0–0.9)
INR BLD: 0.96 RATIO — SIGNIFICANT CHANGE UP (ref 0.85–1.16)
INR BLD: 0.99 RATIO — SIGNIFICANT CHANGE UP (ref 0.85–1.16)
LACTATE SERPL-SCNC: 9 MMOL/L — CRITICAL HIGH (ref 0.5–2)
LYMPHOCYTES # BLD AUTO: 1.02 K/UL — SIGNIFICANT CHANGE UP (ref 1–3.3)
LYMPHOCYTES # BLD AUTO: 7.4 % — LOW (ref 13–44)
MAGNESIUM SERPL-MCNC: 2.3 MG/DL — SIGNIFICANT CHANGE UP (ref 1.6–2.6)
MAGNESIUM SERPL-MCNC: 2.5 MG/DL — SIGNIFICANT CHANGE UP (ref 1.6–2.6)
MCHC RBC-ENTMCNC: 32 PG — SIGNIFICANT CHANGE UP (ref 27–34)
MCHC RBC-ENTMCNC: 32.3 PG — SIGNIFICANT CHANGE UP (ref 27–34)
MCHC RBC-ENTMCNC: 32.5 G/DL — SIGNIFICANT CHANGE UP (ref 32–36)
MCHC RBC-ENTMCNC: 32.9 G/DL — SIGNIFICANT CHANGE UP (ref 32–36)
MCHC RBC-ENTMCNC: 32.9 PG — SIGNIFICANT CHANGE UP (ref 27–34)
MCHC RBC-ENTMCNC: 33.1 G/DL — SIGNIFICANT CHANGE UP (ref 32–36)
MCV RBC AUTO: 100 FL — SIGNIFICANT CHANGE UP (ref 80–100)
MCV RBC AUTO: 97.3 FL — SIGNIFICANT CHANGE UP (ref 80–100)
MCV RBC AUTO: 98.5 FL — SIGNIFICANT CHANGE UP (ref 80–100)
MONOCYTES # BLD AUTO: 0.97 K/UL — HIGH (ref 0–0.9)
MONOCYTES NFR BLD AUTO: 7 % — SIGNIFICANT CHANGE UP (ref 2–14)
NEUTROPHILS # BLD AUTO: 11.71 K/UL — HIGH (ref 1.8–7.4)
NEUTROPHILS NFR BLD AUTO: 84.7 % — HIGH (ref 43–77)
NRBC # BLD: 0 /100 WBCS — SIGNIFICANT CHANGE UP (ref 0–0)
NRBC BLD-RTO: 0 /100 WBCS — SIGNIFICANT CHANGE UP (ref 0–0)
PHOSPHATE SERPL-MCNC: 3.4 MG/DL — SIGNIFICANT CHANGE UP (ref 2.5–4.5)
PHOSPHATE SERPL-MCNC: 5.2 MG/DL — HIGH (ref 2.5–4.5)
PLATELET # BLD AUTO: 104 K/UL — LOW (ref 150–400)
PLATELET # BLD AUTO: 114 K/UL — LOW (ref 150–400)
PLATELET # BLD AUTO: 120 K/UL — LOW (ref 150–400)
POTASSIUM SERPL-MCNC: 4.7 MMOL/L — SIGNIFICANT CHANGE UP (ref 3.5–5.3)
POTASSIUM SERPL-MCNC: 5 MMOL/L — SIGNIFICANT CHANGE UP (ref 3.5–5.3)
POTASSIUM SERPL-MCNC: 5.6 MMOL/L — HIGH (ref 3.5–5.3)
POTASSIUM SERPL-SCNC: 4.7 MMOL/L — SIGNIFICANT CHANGE UP (ref 3.5–5.3)
POTASSIUM SERPL-SCNC: 5 MMOL/L — SIGNIFICANT CHANGE UP (ref 3.5–5.3)
POTASSIUM SERPL-SCNC: 5.6 MMOL/L — HIGH (ref 3.5–5.3)
PROCALCITONIN SERPL-MCNC: 1.07 NG/ML — HIGH (ref 0.02–0.1)
PROT SERPL-MCNC: 6.2 G/DL — SIGNIFICANT CHANGE UP (ref 6–8.3)
PROT SERPL-MCNC: 7.5 G/DL — SIGNIFICANT CHANGE UP (ref 6–8.3)
PROTHROM AB SERPL-ACNC: 11 SEC — SIGNIFICANT CHANGE UP (ref 9.9–13.4)
PROTHROM AB SERPL-ACNC: 11.4 SEC — SIGNIFICANT CHANGE UP (ref 9.9–13.4)
RBC # BLD: 3.72 M/UL — LOW (ref 4.2–5.8)
RBC # BLD: 3.91 M/UL — LOW (ref 4.2–5.8)
RBC # BLD: 4.38 M/UL — SIGNIFICANT CHANGE UP (ref 4.2–5.8)
RBC # FLD: 12.5 % — SIGNIFICANT CHANGE UP (ref 10.3–14.5)
RBC # FLD: 12.6 % — SIGNIFICANT CHANGE UP (ref 10.3–14.5)
RBC # FLD: 12.7 % — SIGNIFICANT CHANGE UP (ref 10.3–14.5)
SODIUM SERPL-SCNC: 133 MMOL/L — LOW (ref 135–145)
SODIUM SERPL-SCNC: 137 MMOL/L — SIGNIFICANT CHANGE UP (ref 135–145)
SODIUM SERPL-SCNC: 139 MMOL/L — SIGNIFICANT CHANGE UP (ref 135–145)
TROPONIN T, HIGH SENSITIVITY RESULT: 88 NG/L — HIGH (ref 0–51)
WBC # BLD: 13.82 K/UL — HIGH (ref 3.8–10.5)
WBC # BLD: 17.77 K/UL — HIGH (ref 3.8–10.5)
WBC # BLD: 6.72 K/UL — SIGNIFICANT CHANGE UP (ref 3.8–10.5)
WBC # FLD AUTO: 13.82 K/UL — HIGH (ref 3.8–10.5)
WBC # FLD AUTO: 17.77 K/UL — HIGH (ref 3.8–10.5)
WBC # FLD AUTO: 6.72 K/UL — SIGNIFICANT CHANGE UP (ref 3.8–10.5)

## 2025-01-17 PROCEDURE — 71045 X-RAY EXAM CHEST 1 VIEW: CPT | Mod: 26,77

## 2025-01-17 PROCEDURE — 93306 TTE W/DOPPLER COMPLETE: CPT | Mod: 26

## 2025-01-17 PROCEDURE — 71250 CT THORAX DX C-: CPT | Mod: 26

## 2025-01-17 PROCEDURE — 71045 X-RAY EXAM CHEST 1 VIEW: CPT | Mod: 26

## 2025-01-17 PROCEDURE — 70551 MRI BRAIN STEM W/O DYE: CPT | Mod: 26

## 2025-01-17 PROCEDURE — 99291 CRITICAL CARE FIRST HOUR: CPT | Mod: 25

## 2025-01-17 PROCEDURE — 99223 1ST HOSP IP/OBS HIGH 75: CPT | Mod: GC

## 2025-01-17 PROCEDURE — 99232 SBSQ HOSP IP/OBS MODERATE 35: CPT

## 2025-01-17 PROCEDURE — 70450 CT HEAD/BRAIN W/O DYE: CPT | Mod: 26

## 2025-01-17 RX ORDER — ESCITALOPRAM 10 MG/1
15 TABLET, FILM COATED ORAL DAILY
Refills: 0 | Status: DISCONTINUED | OUTPATIENT
Start: 2025-01-17 | End: 2025-01-21

## 2025-01-17 RX ORDER — OSELTAMIVIR PHOSPHATE 75 MG/1
30 CAPSULE ORAL
Refills: 0 | Status: COMPLETED | OUTPATIENT
Start: 2025-01-17 | End: 2025-01-21

## 2025-01-17 RX ORDER — LACOSAMIDE 200 MG/1
200 TABLET, FILM COATED ORAL ONCE
Refills: 0 | Status: DISCONTINUED | OUTPATIENT
Start: 2025-01-17 | End: 2025-01-17

## 2025-01-17 RX ORDER — LURASIDONE HYDROCHLORIDE 80 MG/1
40 TABLET, FILM COATED ORAL AT BEDTIME
Refills: 0 | Status: DISCONTINUED | OUTPATIENT
Start: 2025-01-17 | End: 2025-01-17

## 2025-01-17 RX ORDER — ANTISEPTIC SURGICAL SCRUB 0.04 MG/ML
15 SOLUTION TOPICAL EVERY 12 HOURS
Refills: 0 | Status: DISCONTINUED | OUTPATIENT
Start: 2025-01-17 | End: 2025-01-19

## 2025-01-17 RX ORDER — SODIUM CHLORIDE 9 G/ML
1000 INJECTION, SOLUTION INTRAVENOUS
Refills: 0 | Status: DISCONTINUED | OUTPATIENT
Start: 2025-01-17 | End: 2025-01-18

## 2025-01-17 RX ORDER — LAMOTRIGINE 100 MG/1
100 TABLET ORAL
Refills: 0 | Status: DISCONTINUED | OUTPATIENT
Start: 2025-01-17 | End: 2025-01-21

## 2025-01-17 RX ORDER — VANCOMYCIN HYDROCHLORIDE 50 MG/ML
KIT ORAL
Refills: 0 | Status: DISCONTINUED | OUTPATIENT
Start: 2025-01-17 | End: 2025-01-17

## 2025-01-17 RX ORDER — OSELTAMIVIR PHOSPHATE 75 MG/1
75 CAPSULE ORAL ONCE
Refills: 0 | Status: DISCONTINUED | OUTPATIENT
Start: 2025-01-17 | End: 2025-01-17

## 2025-01-17 RX ORDER — BACTERIOSTATIC SODIUM CHLORIDE 0.9 %
1000 VIAL (ML) INJECTION ONCE
Refills: 0 | Status: COMPLETED | OUTPATIENT
Start: 2025-01-17 | End: 2025-01-17

## 2025-01-17 RX ORDER — CEFTRIAXONE 250 MG/1
1000 INJECTION, POWDER, FOR SOLUTION INTRAMUSCULAR; INTRAVENOUS EVERY 24 HOURS
Refills: 0 | Status: DISCONTINUED | OUTPATIENT
Start: 2025-01-18 | End: 2025-01-18

## 2025-01-17 RX ORDER — ACETAMINOPHEN 160 MG/5ML
1000 SUSPENSION ORAL ONCE
Refills: 0 | Status: COMPLETED | OUTPATIENT
Start: 2025-01-17 | End: 2025-01-17

## 2025-01-17 RX ORDER — CEFTRIAXONE 250 MG/1
INJECTION, POWDER, FOR SOLUTION INTRAMUSCULAR; INTRAVENOUS
Refills: 0 | Status: DISCONTINUED | OUTPATIENT
Start: 2025-01-17 | End: 2025-01-17

## 2025-01-17 RX ORDER — VANCOMYCIN HYDROCHLORIDE 50 MG/ML
1000 KIT ORAL ONCE
Refills: 0 | Status: COMPLETED | OUTPATIENT
Start: 2025-01-17 | End: 2025-01-17

## 2025-01-17 RX ORDER — GABAPENTIN 800 MG/1
300 TABLET ORAL
Refills: 0 | Status: DISCONTINUED | OUTPATIENT
Start: 2025-01-17 | End: 2025-01-17

## 2025-01-17 RX ORDER — CEFTRIAXONE 250 MG/1
1000 INJECTION, POWDER, FOR SOLUTION INTRAMUSCULAR; INTRAVENOUS ONCE
Refills: 0 | Status: COMPLETED | OUTPATIENT
Start: 2025-01-17 | End: 2025-01-17

## 2025-01-17 RX ORDER — ATORVASTATIN CALCIUM 80 MG/1
20 TABLET, FILM COATED ORAL AT BEDTIME
Refills: 0 | Status: DISCONTINUED | OUTPATIENT
Start: 2025-01-17 | End: 2025-01-19

## 2025-01-17 RX ORDER — NOREPINEPHRINE BITARTRATE 1 MG/ML
0.05 INJECTION, SOLUTION, CONCENTRATE INTRAVENOUS
Qty: 8 | Refills: 0 | Status: DISCONTINUED | OUTPATIENT
Start: 2025-01-17 | End: 2025-01-18

## 2025-01-17 RX ORDER — OSELTAMIVIR PHOSPHATE 75 MG/1
75 CAPSULE ORAL
Refills: 0 | Status: DISCONTINUED | OUTPATIENT
Start: 2025-01-17 | End: 2025-01-17

## 2025-01-17 RX ORDER — NOREPINEPHRINE BITARTRATE 1 MG/ML
0.05 INJECTION, SOLUTION, CONCENTRATE INTRAVENOUS
Qty: 8 | Refills: 0 | Status: DISCONTINUED | OUTPATIENT
Start: 2025-01-17 | End: 2025-01-17

## 2025-01-17 RX ORDER — LEVETIRACETAM 750 MG/1
1000 TABLET, FILM COATED ORAL EVERY 12 HOURS
Refills: 0 | Status: DISCONTINUED | OUTPATIENT
Start: 2025-01-17 | End: 2025-01-17

## 2025-01-17 RX ORDER — LACOSAMIDE 200 MG/1
200 TABLET, FILM COATED ORAL
Refills: 0 | Status: DISCONTINUED | OUTPATIENT
Start: 2025-01-17 | End: 2025-01-17

## 2025-01-17 RX ORDER — DEXMEDETOMIDINE HYDROCHLORIDE 4 UG/ML
0.5 INJECTION, SOLUTION INTRAVENOUS
Qty: 200 | Refills: 0 | Status: DISCONTINUED | OUTPATIENT
Start: 2025-01-17 | End: 2025-01-19

## 2025-01-17 RX ORDER — CEFTRIAXONE 250 MG/1
INJECTION, POWDER, FOR SOLUTION INTRAMUSCULAR; INTRAVENOUS
Refills: 0 | Status: DISCONTINUED | OUTPATIENT
Start: 2025-01-17 | End: 2025-01-18

## 2025-01-17 RX ORDER — HEPARIN SODIUM,PORCINE 10000/ML
7500 VIAL (ML) INJECTION EVERY 12 HOURS
Refills: 0 | Status: DISCONTINUED | OUTPATIENT
Start: 2025-01-17 | End: 2025-01-23

## 2025-01-17 RX ORDER — OSELTAMIVIR PHOSPHATE 75 MG/1
30 CAPSULE ORAL ONCE
Refills: 0 | Status: DISCONTINUED | OUTPATIENT
Start: 2025-01-17 | End: 2025-01-18

## 2025-01-17 RX ORDER — PROPOFOL 10 MG/ML
20 INJECTION, EMULSION INTRAVENOUS
Qty: 1000 | Refills: 0 | Status: DISCONTINUED | OUTPATIENT
Start: 2025-01-17 | End: 2025-01-18

## 2025-01-17 RX ORDER — LACOSAMIDE 200 MG/1
150 TABLET, FILM COATED ORAL
Refills: 0 | Status: DISCONTINUED | OUTPATIENT
Start: 2025-01-17 | End: 2025-01-18

## 2025-01-17 RX ORDER — IPRATROPIUM BROMIDE AND ALBUTEROL SULFATE .5; 2.5 MG/3ML; MG/3ML
3 SOLUTION RESPIRATORY (INHALATION) ONCE
Refills: 0 | Status: COMPLETED | OUTPATIENT
Start: 2025-01-17 | End: 2025-01-17

## 2025-01-17 RX ORDER — LEVETIRACETAM 750 MG/1
1500 TABLET, FILM COATED ORAL
Refills: 0 | Status: DISCONTINUED | OUTPATIENT
Start: 2025-01-17 | End: 2025-01-28

## 2025-01-17 RX ORDER — PROPOFOL 10 MG/ML
10 INJECTION, EMULSION INTRAVENOUS
Qty: 500 | Refills: 0 | Status: DISCONTINUED | OUTPATIENT
Start: 2025-01-17 | End: 2025-01-17

## 2025-01-17 RX ORDER — AMLODIPINE BESYLATE 5 MG
5 TABLET ORAL DAILY
Refills: 0 | Status: DISCONTINUED | OUTPATIENT
Start: 2025-01-17 | End: 2025-01-17

## 2025-01-17 RX ORDER — ANTISEPTIC SURGICAL SCRUB 0.04 MG/ML
1 SOLUTION TOPICAL
Refills: 0 | Status: DISCONTINUED | OUTPATIENT
Start: 2025-01-17 | End: 2025-02-06

## 2025-01-17 RX ORDER — GABAPENTIN 800 MG/1
300 TABLET ORAL
Refills: 0 | Status: DISCONTINUED | OUTPATIENT
Start: 2025-01-17 | End: 2025-01-21

## 2025-01-17 RX ORDER — LABETALOL HYDROCHLORIDE 300 MG/1
10 TABLET, FILM COATED ORAL ONCE
Refills: 0 | Status: COMPLETED | OUTPATIENT
Start: 2025-01-17 | End: 2025-01-17

## 2025-01-17 RX ADMIN — NOREPINEPHRINE BITARTRATE 9.78 MICROGRAM(S)/KG/MIN: 1 INJECTION, SOLUTION, CONCENTRATE INTRAVENOUS at 21:55

## 2025-01-17 RX ADMIN — Medication 5 MILLIGRAM(S): at 05:34

## 2025-01-17 RX ADMIN — LAMOTRIGINE 100 MILLIGRAM(S): 100 TABLET ORAL at 05:37

## 2025-01-17 RX ADMIN — ATORVASTATIN CALCIUM 20 MILLIGRAM(S): 80 TABLET, FILM COATED ORAL at 23:07

## 2025-01-17 RX ADMIN — ESCITALOPRAM 15 MILLIGRAM(S): 10 TABLET, FILM COATED ORAL at 23:00

## 2025-01-17 RX ADMIN — Medication 1000 MILLILITER(S): at 14:10

## 2025-01-17 RX ADMIN — LEVETIRACETAM 400 MILLIGRAM(S): 750 TABLET, FILM COATED ORAL at 23:00

## 2025-01-17 RX ADMIN — LEVETIRACETAM 1000 MILLIGRAM(S): 750 TABLET, FILM COATED ORAL at 18:34

## 2025-01-17 RX ADMIN — IPRATROPIUM BROMIDE AND ALBUTEROL SULFATE 3 MILLILITER(S): .5; 2.5 SOLUTION RESPIRATORY (INHALATION) at 19:20

## 2025-01-17 RX ADMIN — VANCOMYCIN HYDROCHLORIDE 250 MILLIGRAM(S): KIT at 18:39

## 2025-01-17 RX ADMIN — ACETAMINOPHEN 400 MILLIGRAM(S): 160 SUSPENSION ORAL at 01:59

## 2025-01-17 RX ADMIN — LACOSAMIDE 200 MILLIGRAM(S): 200 TABLET, FILM COATED ORAL at 22:55

## 2025-01-17 RX ADMIN — CEFTRIAXONE 100 MILLIGRAM(S): 250 INJECTION, POWDER, FOR SOLUTION INTRAMUSCULAR; INTRAVENOUS at 21:53

## 2025-01-17 RX ADMIN — ACETAMINOPHEN 1000 MILLIGRAM(S): 160 SUSPENSION ORAL at 14:45

## 2025-01-17 RX ADMIN — Medication 7500 UNIT(S): at 23:03

## 2025-01-17 RX ADMIN — LAMOTRIGINE 100 MILLIGRAM(S): 100 TABLET ORAL at 23:13

## 2025-01-17 RX ADMIN — DEXMEDETOMIDINE HYDROCHLORIDE 13 MICROGRAM(S)/KG/HR: 4 INJECTION, SOLUTION INTRAVENOUS at 21:56

## 2025-01-17 RX ADMIN — GABAPENTIN 300 MILLIGRAM(S): 800 TABLET ORAL at 18:34

## 2025-01-17 RX ADMIN — ESCITALOPRAM 15 MILLIGRAM(S): 10 TABLET, FILM COATED ORAL at 13:14

## 2025-01-17 RX ADMIN — ACETAMINOPHEN 650 MILLIGRAM(S): 160 SUSPENSION ORAL at 13:14

## 2025-01-17 RX ADMIN — CEFTRIAXONE 100 MILLIGRAM(S): 250 INJECTION, POWDER, FOR SOLUTION INTRAMUSCULAR; INTRAVENOUS at 14:15

## 2025-01-17 RX ADMIN — PROPOFOL 12.5 MICROGRAM(S)/KG/MIN: 10 INJECTION, EMULSION INTRAVENOUS at 21:55

## 2025-01-17 RX ADMIN — ACETAMINOPHEN 400 MILLIGRAM(S): 160 SUSPENSION ORAL at 19:15

## 2025-01-17 RX ADMIN — OSELTAMIVIR PHOSPHATE 30 MILLIGRAM(S): 75 CAPSULE ORAL at 23:02

## 2025-01-17 RX ADMIN — SODIUM CHLORIDE 75 MILLILITER(S): 9 INJECTION, SOLUTION INTRAVENOUS at 22:55

## 2025-01-17 RX ADMIN — LABETALOL HYDROCHLORIDE 10 MILLIGRAM(S): 300 TABLET, FILM COATED ORAL at 19:15

## 2025-01-17 RX ADMIN — ACETAMINOPHEN 400 MILLIGRAM(S): 160 SUSPENSION ORAL at 14:15

## 2025-01-17 RX ADMIN — ACETAMINOPHEN 1000 MILLIGRAM(S): 160 SUSPENSION ORAL at 03:00

## 2025-01-17 RX ADMIN — ACETAMINOPHEN 650 MILLIGRAM(S): 160 SUSPENSION ORAL at 14:15

## 2025-01-17 RX ADMIN — LACOSAMIDE 150 MILLIGRAM(S): 200 TABLET, FILM COATED ORAL at 23:07

## 2025-01-17 RX ADMIN — GABAPENTIN 300 MILLIGRAM(S): 800 TABLET ORAL at 23:02

## 2025-01-17 RX ADMIN — GABAPENTIN 300 MILLIGRAM(S): 800 TABLET ORAL at 05:34

## 2025-01-17 RX ADMIN — Medication 7500 UNIT(S): at 05:35

## 2025-01-17 RX ADMIN — LEVETIRACETAM 1000 MILLIGRAM(S): 750 TABLET, FILM COATED ORAL at 05:34

## 2025-01-17 RX ADMIN — Medication 4 MILLIGRAM(S): at 21:07

## 2025-01-17 RX ADMIN — Medication 7500 UNIT(S): at 18:34

## 2025-01-17 NOTE — PROGRESS NOTE ADULT - ASSESSMENT
Assessment:  Patient OSCAR MILAN is a 69y (1955) with a PMHx significant for Epilepsy (febrile sz in childhood, GTC in adulthood, on Keppra, Lamictal and Gabapentin), metastatic testicular cancer on etoposide/cisplatin chemo, s/p L orchidectomy, hyperparathyroidism, obesity, VAZQUEZ, schizophrenia comes in after event of LOC in AM. Witnessed by neighbor, unclear description, unclear time period, no perceived aura, mild post event confusion, has head trauma, but no tongue bite, incontinence. Following with Dr. Lopez. He is on Keppra 1g BID, Lamotrigine 100mg BID, Gabapentin 300mg BID. No recent med changes. He is tested Flu +ve. Has fever, white count, high lactate. Had clinical episodes of possible GTC 1/17 and appeared postictal after. VS /150, HR 96, spo2 89. Lab work showed WBC 13.82, HCO3 14, AG 24, LA 9, CK 1015, . On exam, lethargic, intermittently blanking, but no gross focal deficits.     Impression: GTC. Acute worsening of frequency likely due to underlying infection. Rule out brain hemorrhage given recent headstrike.     Recs:  -Urgent CTH non con to rule out brain hemorrhage given recent headstrike.   -he already got PO Keppra 1g around 1845  -if has any convulsive movements after this, load with IV vimpat 200mg   -if has GTC for >3min and/or unstable vitals, give IV ativan 2mg  -sepsis workup and management as per primary team  -vEEG to capture and character the events  -Consider MRI brain w and wo contrast to look for potential seizure foci that could cause increased frequency especially given hx of metastatic testicular cancer  -continue home ASM Keppra, Lamotrigine, Gabapentin  -orthostatic vitals  -Syncopal workup as per primary team  -rest of care as per primary team    Case discussed with epilepsy fellow Dr. Randolph and to be seen with attending in AM.    Assessment:  Patient OSCAR MILAN is a 69y (1955) with a PMHx significant for Epilepsy (febrile sz in childhood, GTC in adulthood, on Keppra, Lamictal and Gabapentin), metastatic testicular cancer on etoposide/cisplatin chemo, s/p L orchidectomy, hyperparathyroidism, obesity, VAZQUEZ, schizophrenia comes in after event of LOC in AM. Witnessed by neighbor, unclear description, unclear time period, no perceived aura, mild post event confusion, has head trauma, but no tongue bite, incontinence. Following with Dr. Lopez. He is on Keppra 1g BID, Lamotrigine 100mg BID, Gabapentin 300mg BID. No recent med changes. He is tested Flu +ve. Has fever, white count, high lactate. Had clinical episodes of possible GTC 1/17 and appeared postictal after. VS /150, HR 96, spo2 89. Lab work showed WBC 13.82, HCO3 14, AG 24, LA 9, CK 1015, . On exam, lethargic, intermittently blanking, but no gross focal deficits.     Impression: GTC. Acute worsening of frequency likely due to underlying infection. Rule out brain hemorrhage given recent headstrike.     Recs:  -Urgent CTH non con to rule out brain hemorrhage given recent headstrike.   -he already got PO Keppra 1g around 1845, had GTC around 1900  -if has any convulsive movements after this, load with IV vimpat 200mg   -if has GTC for >3min and/or unstable vitals, give IV ativan 2mg  -sepsis workup and management as per primary team  -vEEG to capture and character the events  -Consider MRI brain w and wo contrast to look for potential seizure foci that could cause increased frequency especially given hx of metastatic testicular cancer  -continue home ASM Keppra, Lamotrigine, Gabapentin  -orthostatic vitals  -Syncopal workup as per primary team  -rest of care as per primary team    Updated A/P 1/17 20:30:  Had another GTC as witnessed by RRT team in the CT scanner, he was intubated and sedated for airway protection. Patient is being transferred to MICU.  As per discussion with epilepsy fellow Dr Randolph, load with IV Vimpat 200mg STAT f/b IV Vimpat 150mg BID. Increase IV Keppra to 1500mg BID.   IV sedation as per ICU team.  CTH no bleed.   Will prioritize EEG.  Family/sister updated at bedside.     Case discussed with epilepsy fellow Dr. Randolph and to be seen with attending in AM.

## 2025-01-17 NOTE — PROGRESS NOTE ADULT - ASSESSMENT
IMPRESSION AND PLAN:  69M w h/o seizures, CKD, HTN here after fall with c/f seizures. + LOC and right sided chest pain tender to palpation. Trop elevated and downtrending. +Flu A. TTE unremarkable.     He has not had any exertional chest pain or dyspnea but does note cough over last couple weeks. Reports he thinks the event was a seizure similar to last one 3 years ago. Neuro following for possible seizures.     TTE with normal LVEF and no regional wall motion abnormality. Suspect mild trop elevation is demand in the setting of flu A and the syncopal +/- seizure episode. No evidence of arrhythmias. Would not treat for ACS and does not require further inpatient workup. Follow-up cardiology in 2 weeks post discharge.     We will sign off for now. Please do not hesitate to reach out with any questions/concerns. Thank you for this interesting consult.     Hima Edge MD  Cardiologist, [facility] on-service contact/call information, go to [email or web address] and use "[email or web address]" to login.    35 minutes were spent on this encounter for extensive review of medical record details including labs and/or imaging studies and/or adjacent care team and consultant records, as well as review and reconciliation of current medications. Time was spent on obtaining a history, performing physical examination of patient, and answering patient and/or family questions regarding plan of care. Time was also spent discussing plan of care with patient’s other care team members including primary and consulting teams. Time also was spent on documentation of this encounter into the EHR.

## 2025-01-17 NOTE — CHART NOTE - NSCHARTNOTEFT_GEN_A_CORE
MICU Accept Note    CHIEF COMPLAINT:     HPI / INTERVAL HISTORY:  69M w/ hx of metastatic testicular cancer (s/p L orchiectomy, on etoposide/cisplatin chemo, last dose 6/2024), epilepsy (grand-mal seizures), schizophrenia, developmental delay, HTN, HLD, VAZQUEZ, stage III CKD, severe obesity, secondary hyperparathyroidism, anemia, thrombocytopenia who presented to ED for fall, possible seizure prior. Sister at bedside providing history as well. Patient reports last seizure 3 years ago, is adherent with medications (on lamotrigine and keppra), states he believes he had a seizure at home while ambulating to get mail from his mailbox. States he doesn't typically have an aura, just 'blacks out' for about 5 minutes. Notes this time was found by neighbor with abrasion to forehead, R cheek. Unsure time down, denies tongue-biting, b/b incontinence. Normally ambulates with cane. Per ED triage note, on EMS arrival, appeared in post-ictal state. Endorsing R sided chest pressure, and neck tenderness post fall. States has been in normal state of health, eating/drinking well, did have some hot flashes/chills last night, but did not take temperature. Denies any f/c, cough, SOB, rhinorrhea, sore throat, or any other acute sx.     In ED received 100 mg lamotrigine, keppra 1g. CTH/C spine/MF largely unremarkable apart from  mild right supraorbital and premaxillary/buccal soft tissue swelling/hematomas. CXR without focal consolidation. CBC and BMP largely unremarkable; noted to be influenza positive. Trop elevated for which cardiology was consulted; recommended TTE.     RRT called again for seizure activity, grand mal with b/l nystagmus and upper extremity jerking. Pt had received 1g keppra PO prior to seizure, was due for 6 PM lamictal but unable to receive. Seizure abated, patient post ictal for about a minute before another episode lasting about 1-2 minutes occurred. Patient then had limited response to questioning, which was interpreted to be post-ictal period, no focal deficits. Notably his blood pressure was SBP 200s in the R arm and 140s in the L arm, which persisted on subsequent readings.     Neurology was contacted and recommended holding additional AEDs. Patient was given labetalol 10 mg for hypertensive emergency, IV acetaminophen for rectal temperature 102. Vitals initially improved but patient had significant bronchoconstriction, for which 2 Duonebs treatments were given. Patient developed a jerking motion of the RUE, which family member noted was prodrome for additional seizure, neurology contacted who came to bedside and evaluated patient, recommended obtaining AED levels and CT head. Patient had not received vancomycin as ordered at prior RRT 2/2 family concern about renal function, discussed with family and went ahead with vancomycin administration.     Patient transported to CT head and CT chest without event, but post scan, upon transfer to bed had another episode of grand mal seizure, initially monitored, but developed apnea and ativan 2 mg given with seizure breaking. Patient airway opened with jaw-thrust, patient returned to [facility] and suctioned. Patient noted without gag upon OPA insertion, decision made to intubate and anesthesia called.     Patient started on propofol and levophed. Neurology reassessed and recommended 200 vimpat load. Patient then transferred to MICU.    PAST MEDICAL & SURGICAL HISTORY:  Hypertension, unspecified type  Other hyperlipidemia  History of epilepsy  Paranoid schizophrenia  Obstructive sleep apnea  Testicular cancer  H/O Spinal surgery    FAMILY HISTORY:    SOCIAL HISTORY:  Smoking: [  ] Never Smoked  [  ] Former Smoker (# packs x # years)  [  ] Current Smoker (# packs x # years)  Substance Use:   EtOH Use:   Marital Status: [  ] Single  [  ]   [  ]   [  ]   Sexual History:   Occupation:  Recent Travel:  Country of Birth:   Advance Directives:     HOME MEDICATIONS:    Allergies  penicillin (Hives)  seasonal allergies (Unknown)    Intolerances  latex (Rash)      REVIEW OF SYSTEMS:  Constitutional: No fevers, chills, weight loss, weight gain  HEENT: No vision problems, eye pain, nasal congestion, rhinorrhea, sore throat, dysphagia  CV: No chest pain, orthopnea, palpitations  Resp: No cough, dyspnea, wheezing, hemoptysis  GI: No nausea, vomiting, diarrhea, constipation, abdominal pain  : [ ] dysuria [ ] nocturia [ ] hematuria [ ] increased urinary frequency  Musculoskeletal: [ ] back pain [ ] myalgias [ ] arthralgias [ ] fracture  Skin: [ ] rash [ ] itch  Neurological: [ ] headache [ ] dizziness [ ] syncope [ ] weakness [ ] numbness  Psychiatric: [ ] anxiety [ ] depression  Endocrine: [ ] diabetes [ ] thyroid problem  Hematologic/Lymphatic: [ ] anemia [ ] bleeding problem  Allergic/Immunologic: [ ] itchy eyes [ ] nasal discharge [ ] hives [ ] angioedema  [ ] All other systems negative  [X] Unable to assess ROS because encephalopathy    OBJECTIVE:  ICU Vital Signs Last 24 Hrs  T(C): 37.1 (17 Jan 2025 18:15), Max: 38.2 (17 Jan 2025 12:15)  T(F): 98.7 (17 Jan 2025 18:15), Max: 100.7 (17 Jan 2025 12:15)  HR: 88 (17 Jan 2025 21:05) (70 - 134)  BP: 204/113 (17 Jan 2025 18:15) (129/71 - 204/113)  BP(mean): --  ABP: --  ABP(mean): --  RR: 18 (17 Jan 2025 18:15) (18 - 20)  SpO2: 100% (17 Jan 2025 21:05) (91% - 100%)    O2 Parameters below as of 17 Jan 2025 18:15  Patient On (Oxygen Delivery Method): nasal cannula  O2 Flow (L/min): 2      Mode: AC/ CMV (Assist Control/ Continuous Mandatory Ventilation), RR (machine): 20, TV (machine): 450, FiO2: 70, PEEP: 5, ITime: 1, MAP: 12, PIP: 30    01-16 @ 07:01  -  01-17 @ 07:00  --------------------------------------------------------  IN: 0 mL / OUT: 1800 mL / NET: -1800 mL    CAPILLARY BLOOD GLUCOSE    POCT Blood Glucose.: 150 mg/dL (17 Jan 2025 18:57)      LOS: 1d    VITALS:   T(C): 37.1 (01-17-25 @ 18:15), Max: 38.2 (01-17-25 @ 12:15)  HR: 88 (01-17-25 @ 21:05) (70 - 134)  BP: 204/113 (01-17-25 @ 18:15) (129/71 - 204/113)  RR: 18 (01-17-25 @ 18:15) (18 - 20)  SpO2: 100% (01-17-25 @ 21:05) (91% - 100%)    GENERAL: NAD, lying in bed comfortably  HEAD:  Atraumatic, Normocephalic  EYES: EOMI, PERRLA, conjunctiva and sclera clear  ENT: Moist mucous membranes  NECK: Supple, No JVD  CHEST/LUNG: Clear to auscultation bilaterally; No rales, rhonchi, wheezing, or rubs. Unlabored respirations  HEART: Regular rate and rhythm; No murmurs, rubs, or gallops  ABDOMEN: BSx4; Soft, nontender, nondistended  EXTREMITIES:  2+ Peripheral Pulses, brisk capillary refill. No clubbing, cyanosis, or edema  NERVOUS SYSTEM:  A&Ox3, no focal deficits   SKIN: No rashes or lesions    LINES:     HOSPITAL MEDICATIONS:  MEDICATIONS  (STANDING):  atorvastatin 20 milliGRAM(s) Oral at bedtime  cefTRIAXone   IVPB      cefTRIAXone   IVPB 1000 milliGRAM(s) IV Intermittent once  chlorhexidine 0.12% Liquid 15 milliLiter(s) Oral Mucosa every 12 hours  chlorhexidine 2% Cloths 1 Application(s) Topical <User Schedule>  escitalopram 15 milliGRAM(s) Oral daily  gabapentin Solution 300 milliGRAM(s) Oral two times a day  heparin   Injectable 7500 Unit(s) SubCutaneous every 12 hours  influenza  Vaccine (HIGH DOSE) 0.5 milliLiter(s) IntraMuscular once  lacosamide 200 milliGRAM(s) Oral two times a day  lacosamide Injectable 200 milliGRAM(s) IV Push once  lamoTRIgine 100 milliGRAM(s) Oral two times a day  levETIRAcetam  IVPB 1500 milliGRAM(s) IV Intermittent two times a day  LORazepam   Injectable 4 milliGRAM(s) IV Push once  lurasidone 40 milliGRAM(s) Oral at bedtime  norepinephrine Infusion 0.05 MICROgram(s)/kG/Min (9.78 mL/Hr) IV Continuous <Continuous>  oseltamivir 75 milliGRAM(s) Oral once  oseltamivir 30 milliGRAM(s) Oral two times a day  propofol Infusion 20 MICROgram(s)/kG/Min (12.5 mL/Hr) IV Continuous <Continuous>    MEDICATIONS  (PRN):    LABS:                        14.4   13.82 )-----------( 120      ( 17 Jan 2025 19:13 )             43.8     Hgb Trend: 14.4<--, 12.5<--, 11.6<--  01-17    139  |  101  |  32[H]  ----------------------------<  171[H]  4.7   |  14[L]  |  2.55[H]    Ca    9.2      17 Jan 2025 19:13  Phos  5.2     01-17  Mg     2.3     01-17    TPro  7.5  /  Alb  4.6  /  TBili  0.3  /  DBili  x   /  AST  75[H]  /  ALT  61[H]  /  AlkPhos  139[H]  01-17    Creatinine Trend: 2.55<--, 2.27<--, 2.79<--  PT/INR - ( 17 Jan 2025 19:13 )   PT: 11.0 sec;   INR: 0.96 ratio       PTT - ( 17 Jan 2025 19:13 )  PTT:40.6 sec  Urinalysis Basic - ( 17 Jan 2025 19:13 )    Color: x / Appearance: x / SG: x / pH: x  Gluc: 171 mg/dL / Ketone: x  / Bili: x / Urobili: x   Blood: x / Protein: x / Nitrite: x   Leuk Esterase: x / RBC: x / WBC x   Sq Epi: x / Non Sq Epi: x / Bacteria: x    Arterial Blood Gas:  01-17 @ 19:14  7.15/34/91/12/96.1/-16.0  ABG lactate: --  Arterial Blood Gas:  01-17 @ 14:50  7.25/31/112/14/97.3/-12.4  ABG lactate: --    Venous Blood Gas:  01-16 @ 11:22  7.29/48/40/23/68.0  VBG Lactate: 1.1  Venous Blood Gas:  01-16 @ 07:46  7.26/46/41/21/68.3  VBG Lactate: 2.6    MICROBIOLOGY:     RADIOLOGY & ADDITIONAL TESTS: reviewed       ASSESSMENT & PLAN:  69M w/ hx of metastatic testicular cancer (s/p L orchiectomy, on etoposide/cisplatin chemo, last dose 6/2024), epilepsy (grand-mal seizures), schizophrenia, developmental delay, HTN, HLD, VAZQUEZ, stage III CKD, severe obesity, secondary hyperparathyroidism, anemia, thrombocytopenia who presented to ED for fall, possible seizure prior. S/p 2 RRT for grand mal seizures, intubated for airway protection, admitted to MICU for further management.    ******NEUROLOGY************  #Status epilepticus  #Aspiration precautions  #Seizure precautions  - Pt s/p 2 RRTs for seizure activity, grand mal with b/l nystagmus and upper extremity jerking; 2 seizure episodes lasting 1-2 mins each with one minute of post ictal confusion in btwn  - Pt did not have tongue injuries or oral/mucosal lacerations on brief oral exam  - Head and neck exam with minor trauma to R forehead, CT scan neg for bleed  - Now intubated for status epilepticus  - Sedation with IV propofol and precedex, IV ativan PRN  - IV versed  - Discussed with neuroepilepsy, 200mg vimpat to be loaded by neuro team  - Pending EEG  - Continue antiepileptic drugs: keppra 1.5g bid, lamotrigine 100mg bid, gabapentin 300mg bid    ******CARDIOVASCULAR************  - Hemodynamically stable, no pressor requirements currently  - Recent TTE obtained showing normal LV fxn i/s/o testicular cancer chemo treatment    ******PULMONARY************  #Airway precautions  - No aspiration noted during intubation, no tongue-bite, brief oral exam neg for any injury/trauma  - Intubated for airway protection and aspiration precuations during status epilepticus  - Wean down FIO2 to minimum  - CXR to ensure ET and OG tube positioning  - Titrate vent settings based on SpO2 and blood gas  - Head elevation to 15-30 degrees    ******INFECTIOUS DISEASE************  #Aspiration PNA  #Flu A positive  - Pt now post-intubation  - Will cover for possible aspiration PNA with CTX 1g daily x7 days (1/17- )  - Tamiflu 30mg bid x5 days for Flu positive  - Bronch culture    ******GASTROINTESTINAL************  - OG tube placed, however not feeding until seizure ruled out due to risk of aspiration  - Can use OG tube for meds    ******RENAL************  #Metabolic acidosis  #Lactic acidosis  - HCO3 12, pH 7.15, lactate 9.1 as of 1/17 PM  - Will repeat blood gas and BMP q4h    #At risk of rhabdomyolysis  - Will monitor CK    ******ENDOCRINE************  #At risk of hypoglycemia  - Pt NPO except meds due to c/f aspiration i/s/o seizure  - d5 NS or d5 LR, 50-60 cc/h to prevent hypoglycemia    #Hx of secondary hyperparathyroidism    ******HEME************  #DVT ppx  - subQ heparin    GOC: Full Code MICU Accept Note    CHIEF COMPLAINT: seizures    HPI / INTERVAL HISTORY:  69M w/ hx of metastatic testicular cancer (s/p L orchiectomy, on etoposide/cisplatin chemo, last dose 6/2024), epilepsy (grand-mal seizures), schizophrenia, developmental delay, HTN, HLD, VAZQUEZ, stage III CKD, severe obesity, secondary hyperparathyroidism, anemia, thrombocytopenia who presented to ED for fall, possible seizure prior. Sister at bedside providing history as well. Patient reports last seizure 3 years ago, is adherent with medications (on lamotrigine and keppra), states he believes he had a seizure at home while ambulating to get mail from his mailbox. States he doesn't typically have an aura, just 'blacks out' for about 5 minutes. Notes this time was found by neighbor with abrasion to forehead, R cheek. Unsure time down, denies tongue-biting, b/b incontinence. Normally ambulates with cane. Per ED triage note, on EMS arrival, appeared in post-ictal state. Endorsing R sided chest pressure, and neck tenderness post fall. States has been in normal state of health, eating/drinking well, did have some hot flashes/chills last night, but did not take temperature. Denies any f/c, cough, SOB, rhinorrhea, sore throat, or any other acute sx.     In ED received 100 mg lamotrigine, keppra 1g. CTH/C spine/MF largely unremarkable apart from  mild right supraorbital and premaxillary/buccal soft tissue swelling/hematomas. CXR without focal consolidation. CBC and BMP largely unremarkable; noted to be influenza positive. Trop elevated for which cardiology was consulted; recommended TTE.     RRT called again for seizure activity, grand mal with b/l nystagmus and upper extremity jerking. Pt had received 1g keppra PO prior to seizure, was due for 6 PM lamictal but unable to receive. Seizure abated, patient post ictal for about a minute before another episode lasting about 1-2 minutes occurred. Patient then had limited response to questioning, which was interpreted to be post-ictal period, no focal deficits. Notably his blood pressure was SBP 200s in the R arm and 140s in the L arm, which persisted on subsequent readings.     Neurology was contacted and recommended holding additional AEDs. Patient was given labetalol 10 mg for hypertensive emergency, IV acetaminophen for rectal temperature 102. Vitals initially improved but patient had significant bronchoconstriction, for which 2 Duonebs treatments were given. Patient developed a jerking motion of the RUE, which family member noted was prodrome for additional seizure, neurology contacted who came to bedside and evaluated patient, recommended obtaining AED levels and CT head. Patient had not received vancomycin as ordered at prior RRT 2/2 family concern about renal function, discussed with family and went ahead with vancomycin administration.     Patient transported to CT head and CT chest without event, but post scan, upon transfer to bed had another episode of grand mal seizure, initially monitored, but developed apnea and ativan 2 mg given with seizure breaking. Patient airway opened with jaw-thrust, patient returned to [address] [place] [address] and suctioned. Patient noted without gag upon OPA insertion, decision made to intubate and anesthesia called.     Patient started on propofol and levophed. Neurology reassessed and recommended 200 vimpat load. Patient then transferred to MICU.    PAST MEDICAL & SURGICAL HISTORY:  Hypertension, unspecified type  Other hyperlipidemia  History of epilepsy  Paranoid schizophrenia  Obstructive sleep apnea  Testicular cancer  H/O Spinal surgery    FAMILY HISTORY:    SOCIAL HISTORY:  Smoking: [  ] Never Smoked  [  ] Former Smoker (# packs x # years)  [  ] Current Smoker (# packs x # years)  Substance Use:   EtOH Use:   Marital Status: [  ] Single  [  ]   [  ]   [  ]   Sexual History:   Occupation:  Recent Travel:  Country of Birth:   Advance Directives:     HOME MEDICATIONS:    Allergies  penicillin (Hives)  seasonal allergies (Unknown)    Intolerances  latex (Rash)      REVIEW OF SYSTEMS:  Constitutional: No fevers, chills, weight loss, weight gain  HEENT: No vision problems, eye pain, nasal congestion, rhinorrhea, sore throat, dysphagia  CV: No chest pain, orthopnea, palpitations  Resp: No cough, dyspnea, wheezing, hemoptysis  GI: No nausea, vomiting, diarrhea, constipation, abdominal pain  : [ ] dysuria [ ] nocturia [ ] hematuria [ ] increased urinary frequency  Musculoskeletal: [ ] back pain [ ] myalgias [ ] arthralgias [ ] fracture  Skin: [ ] rash [ ] itch  Neurological: [ ] headache [ ] dizziness [ ] syncope [ ] weakness [ ] numbness  Psychiatric: [ ] anxiety [ ] depression  Endocrine: [ ] diabetes [ ] thyroid problem  Hematologic/Lymphatic: [ ] anemia [ ] bleeding problem  Allergic/Immunologic: [ ] itchy eyes [ ] nasal discharge [ ] hives [ ] angioedema  [ ] All other systems negative  [X] Unable to assess ROS because encephalopathy    OBJECTIVE:  ICU Vital Signs Last 24 Hrs  T(C): 37.1 (17 Jan 2025 18:15), Max: 38.2 (17 Jan 2025 12:15)  T(F): 98.7 (17 Jan 2025 18:15), Max: 100.7 (17 Jan 2025 12:15)  HR: 88 (17 Jan 2025 21:05) (70 - 134)  BP: 204/113 (17 Jan 2025 18:15) (129/71 - 204/113)  BP(mean): --  ABP: --  ABP(mean): --  RR: 18 (17 Jan 2025 18:15) (18 - 20)  SpO2: 100% (17 Jan 2025 21:05) (91% - 100%)    O2 Parameters below as of 17 Jan 2025 18:15  Patient On (Oxygen Delivery Method): nasal cannula  O2 Flow (L/min): 2      Mode: AC/ CMV (Assist Control/ Continuous Mandatory Ventilation), RR (machine): 20, TV (machine): 450, FiO2: 70, PEEP: 5, ITime: 1, MAP: 12, PIP: 30    01-16 @ 07:01  -  01-17 @ 07:00  --------------------------------------------------------  IN: 0 mL / OUT: 1800 mL / NET: -1800 mL    CAPILLARY BLOOD GLUCOSE    POCT Blood Glucose.: 150 mg/dL (17 Jan 2025 18:57)      LOS: 1d    VITALS:   T(C): 37.1 (01-17-25 @ 18:15), Max: 38.2 (01-17-25 @ 12:15)  HR: 88 (01-17-25 @ 21:05) (70 - 134)  BP: 204/113 (01-17-25 @ 18:15) (129/71 - 204/113)  RR: 18 (01-17-25 @ 18:15) (18 - 20)  SpO2: 100% (01-17-25 @ 21:05) (91% - 100%)    GENERAL: NAD, lying in bed comfortably  HEAD:  Atraumatic, Normocephalic  EYES: EOMI, PERRLA, conjunctiva and sclera clear  ENT: Moist mucous membranes  NECK: Supple, No JVD  CHEST/LUNG: Clear to auscultation bilaterally; No rales, rhonchi, wheezing, or rubs. Unlabored respirations  HEART: Regular rate and rhythm; No murmurs, rubs, or gallops  ABDOMEN: BSx4; Soft, nontender, nondistended  EXTREMITIES:  2+ Peripheral Pulses, brisk capillary refill. No clubbing, cyanosis, or edema  NERVOUS SYSTEM:  A&Ox3, no focal deficits   SKIN: No rashes or lesions    LINES:     HOSPITAL MEDICATIONS:  MEDICATIONS  (STANDING):  atorvastatin 20 milliGRAM(s) Oral at bedtime  cefTRIAXone   IVPB      cefTRIAXone   IVPB 1000 milliGRAM(s) IV Intermittent once  chlorhexidine 0.12% Liquid 15 milliLiter(s) Oral Mucosa every 12 hours  chlorhexidine 2% Cloths 1 Application(s) Topical <User Schedule>  escitalopram 15 milliGRAM(s) Oral daily  gabapentin Solution 300 milliGRAM(s) Oral two times a day  heparin   Injectable 7500 Unit(s) SubCutaneous every 12 hours  influenza  Vaccine (HIGH DOSE) 0.5 milliLiter(s) IntraMuscular once  lacosamide 200 milliGRAM(s) Oral two times a day  lacosamide Injectable 200 milliGRAM(s) IV Push once  lamoTRIgine 100 milliGRAM(s) Oral two times a day  levETIRAcetam  IVPB 1500 milliGRAM(s) IV Intermittent two times a day  LORazepam   Injectable 4 milliGRAM(s) IV Push once  lurasidone 40 milliGRAM(s) Oral at bedtime  norepinephrine Infusion 0.05 MICROgram(s)/kG/Min (9.78 mL/Hr) IV Continuous <Continuous>  oseltamivir 75 milliGRAM(s) Oral once  oseltamivir 30 milliGRAM(s) Oral two times a day  propofol Infusion 20 MICROgram(s)/kG/Min (12.5 mL/Hr) IV Continuous <Continuous>    MEDICATIONS  (PRN):    LABS:                        14.4   13.82 )-----------( 120      ( 17 Jan 2025 19:13 )             43.8     Hgb Trend: 14.4<--, 12.5<--, 11.6<--  01-17    139  |  101  |  32[H]  ----------------------------<  171[H]  4.7   |  14[L]  |  2.55[H]    Ca    9.2      17 Jan 2025 19:13  Phos  5.2     01-17  Mg     2.3     01-17    TPro  7.5  /  Alb  4.6  /  TBili  0.3  /  DBili  x   /  AST  75[H]  /  ALT  61[H]  /  AlkPhos  139[H]  01-17    Creatinine Trend: 2.55<--, 2.27<--, 2.79<--  PT/INR - ( 17 Jan 2025 19:13 )   PT: 11.0 sec;   INR: 0.96 ratio       PTT - ( 17 Jan 2025 19:13 )  PTT:40.6 sec  Urinalysis Basic - ( 17 Jan 2025 19:13 )    Color: x / Appearance: x / SG: x / pH: x  Gluc: 171 mg/dL / Ketone: x  / Bili: x / Urobili: x   Blood: x / Protein: x / Nitrite: x   Leuk Esterase: x / RBC: x / WBC x   Sq Epi: x / Non Sq Epi: x / Bacteria: x    Arterial Blood Gas:  01-17 @ 19:14  7.15/34/91/12/96.1/-16.0  ABG lactate: --  Arterial Blood Gas:  01-17 @ 14:50  7.25/31/112/14/97.3/-12.4  ABG lactate: --    Venous Blood Gas:  01-16 @ 11:22  7.29/48/40/23/68.0  VBG Lactate: 1.1  Venous Blood Gas:  01-16 @ 07:46  7.26/46/41/21/68.3  VBG Lactate: 2.6    MICROBIOLOGY:     RADIOLOGY & ADDITIONAL TESTS: reviewed       ASSESSMENT & PLAN:  69M w/ hx of metastatic testicular cancer (s/p L orchiectomy, on etoposide/cisplatin chemo, last dose 6/2024), epilepsy (grand-mal seizures), schizophrenia, developmental delay, HTN, HLD, VAZQUEZ, stage III CKD, severe obesity, secondary hyperparathyroidism, anemia, thrombocytopenia who presented to ED for fall, possible seizure prior. S/p 2 RRT for grand mal seizures, intubated for airway protection, admitted to MICU for further management.    ******NEUROLOGY************  #Status epilepticus  #Aspiration precautions  #Seizure precautions  - Pt s/p 2 RRTs for seizure activity, grand mal with b/l nystagmus and upper extremity jerking; 2 seizure episodes lasting 1-2 mins each with one minute of post ictal confusion in btwn  - Pt did not have tongue injuries or oral/mucosal lacerations on brief oral exam  - Head and neck exam with minor trauma to R forehead, CT scan neg for bleed  - Now intubated for status epilepticus  - Sedation with IV propofol and precedex, IV ativan PRN  - IV versed  - Discussed with neuroepilepsy, 200mg vimpat to be loaded by neuro team  - Pending EEG  - Continue antiepileptic drugs: keppra 1.5g bid, lamotrigine 100mg bid, gabapentin 300mg bid    ******CARDIOVASCULAR************  - Hemodynamically stable, no pressor requirements currently  - Recent TTE obtained showing normal LV fxn i/s/o testicular cancer chemo treatment    ******PULMONARY************  #Airway precautions  - No aspiration noted during intubation, no tongue-bite, brief oral exam neg for any injury/trauma  - Intubated for airway protection and aspiration precuations during status epilepticus  - Wean down FIO2 to minimum  - CXR to ensure ET and OG tube positioning  - Titrate vent settings based on SpO2 and blood gas  - Head elevation to 15-30 degrees    ******INFECTIOUS DISEASE************  #Aspiration PNA  #Flu A positive  - Pt now post-intubation  - Will cover for possible aspiration PNA with CTX 1g daily x7 days (1/17- )  - Tamiflu 30mg bid x5 days for Flu positive  - Bronch culture    ******GASTROINTESTINAL************  - OG tube placed, however not feeding until seizure ruled out due to risk of aspiration  - Can use OG tube for meds    ******RENAL************  #Metabolic acidosis  #Lactic acidosis  - HCO3 12, pH 7.15, lactate 9.1 as of 1/17 PM  - Will repeat blood gas and BMP q4h    #At risk of rhabdomyolysis  - Will monitor CK    ******ENDOCRINE************  #At risk of hypoglycemia  - Pt NPO except meds due to c/f aspiration i/s/o seizure  - d5 NS or d5 LR, 50-60 cc/h to prevent hypoglycemia    #Hx of secondary hyperparathyroidism    ******HEME************  #DVT ppx  - subQ heparin    GOC: Full Code MICU Accept Note    CHIEF COMPLAINT: seizures    HPI / INTERVAL HISTORY:  69M w/ hx of metastatic testicular cancer (s/p L orchiectomy, on etoposide/cisplatin chemo, last dose 6/2024), epilepsy (grand-mal seizures), schizophrenia, developmental delay, HTN, HLD, VAZQUEZ, stage III CKD, severe obesity, secondary hyperparathyroidism, anemia, thrombocytopenia who presented to ED for fall, possible seizure prior. Sister at bedside providing history as well. Patient reports last seizure 3 years ago, is adherent with medications (on lamotrigine and keppra), states he believes he had a seizure at home while ambulating to get mail from his mailbox. States he doesn't typically have an aura, just 'blacks out' for about 5 minutes. Notes this time was found by neighbor with abrasion to forehead, R cheek. Unsure time down, denies tongue-biting, b/b incontinence. Normally ambulates with cane. Per ED triage note, on EMS arrival, appeared in post-ictal state. Endorsing R sided chest pressure, and neck tenderness post fall. States has been in normal state of health, eating/drinking well, did have some hot flashes/chills last night, but did not take temperature. Denies any f/c, cough, SOB, rhinorrhea, sore throat, or any other acute sx.     In ED received 100 mg lamotrigine, keppra 1g. CTH/C spine/MF largely unremarkable apart from  mild right supraorbital and premaxillary/buccal soft tissue swelling/hematomas. CXR without focal consolidation. CBC and BMP largely unremarkable; noted to be influenza positive. Trop elevated for which cardiology was consulted; recommended TTE.     RRT called again for seizure activity, grand mal with b/l nystagmus and upper extremity jerking. Pt had received 1g keppra PO prior to seizure, was due for 6 PM lamictal but unable to receive. Seizure abated, patient post ictal for about a minute before another episode lasting about 1-2 minutes occurred. Patient then had limited response to questioning, which was interpreted to be post-ictal period, no focal deficits. Notably his blood pressure was SBP 200s in the R arm and 140s in the L arm, which persisted on subsequent readings.     Neurology was contacted and recommended holding additional AEDs. Patient was given labetalol 10 mg for hypertensive emergency, IV acetaminophen for rectal temperature 102. Vitals initially improved but patient had significant bronchoconstriction, for which 2 Duonebs treatments were given. Patient developed a jerking motion of the RUE, which family member noted was prodrome for additional seizure, neurology contacted who came to bedside and evaluated patient, recommended obtaining AED levels and CT head. Patient had not received vancomycin as ordered at prior RRT 2/2 family concern about renal function, discussed with family and went ahead with vancomycin administration.     Patient transported to CT head and CT chest without event, but post scan, upon transfer to bed had another episode of grand mal seizure, initially monitored, but developed apnea and ativan 2 mg given with seizure breaking. Patient airway opened with jaw-thrust, patient returned to [address] [place] [address] and suctioned. Patient noted without gag upon OPA insertion, decision made to intubate and anesthesia called.     Patient started on propofol and levophed. Neurology reassessed and recommended 200 vimpat load. Patient then transferred to MICU.    PAST MEDICAL & SURGICAL HISTORY:  Hypertension, unspecified type  Other hyperlipidemia  History of epilepsy  Paranoid schizophrenia  Obstructive sleep apnea  Testicular cancer  H/O Spinal surgery    FAMILY HISTORY:    SOCIAL HISTORY:  Smoking: [  ] Never Smoked  [  ] Former Smoker (# packs x # years)  [  ] Current Smoker (# packs x # years)  Substance Use:   EtOH Use:   Marital Status: [  ] Single  [  ]   [  ]   [  ]   Sexual History:   Occupation:  Recent Travel:  Country of Birth:   Advance Directives:     HOME MEDICATIONS:    Allergies  penicillin (Hives)  seasonal allergies (Unknown)    Intolerances  latex (Rash)      REVIEW OF SYSTEMS:  Constitutional: No fevers, chills, weight loss, weight gain  HEENT: No vision problems, eye pain, nasal congestion, rhinorrhea, sore throat, dysphagia  CV: No chest pain, orthopnea, palpitations  Resp: No cough, dyspnea, wheezing, hemoptysis  GI: No nausea, vomiting, diarrhea, constipation, abdominal pain  : [ ] dysuria [ ] nocturia [ ] hematuria [ ] increased urinary frequency  Musculoskeletal: [ ] back pain [ ] myalgias [ ] arthralgias [ ] fracture  Skin: [ ] rash [ ] itch  Neurological: [ ] headache [ ] dizziness [ ] syncope [ ] weakness [ ] numbness  Psychiatric: [ ] anxiety [ ] depression  Endocrine: [ ] diabetes [ ] thyroid problem  Hematologic/Lymphatic: [ ] anemia [ ] bleeding problem  Allergic/Immunologic: [ ] itchy eyes [ ] nasal discharge [ ] hives [ ] angioedema  [ ] All other systems negative  [X] Unable to assess ROS because encephalopathy    OBJECTIVE:  ICU Vital Signs Last 24 Hrs  T(C): 37.1 (17 Jan 2025 18:15), Max: 38.2 (17 Jan 2025 12:15)  T(F): 98.7 (17 Jan 2025 18:15), Max: 100.7 (17 Jan 2025 12:15)  HR: 88 (17 Jan 2025 21:05) (70 - 134)  BP: 204/113 (17 Jan 2025 18:15) (129/71 - 204/113)  BP(mean): --  ABP: --  ABP(mean): --  RR: 18 (17 Jan 2025 18:15) (18 - 20)  SpO2: 100% (17 Jan 2025 21:05) (91% - 100%)    O2 Parameters below as of 17 Jan 2025 18:15  Patient On (Oxygen Delivery Method): nasal cannula  O2 Flow (L/min): 2      Mode: AC/ CMV (Assist Control/ Continuous Mandatory Ventilation), RR (machine): 20, TV (machine): 450, FiO2: 70, PEEP: 5, ITime: 1, MAP: 12, PIP: 30    01-16 @ 07:01  -  01-17 @ 07:00  --------------------------------------------------------  IN: 0 mL / OUT: 1800 mL / NET: -1800 mL    CAPILLARY BLOOD GLUCOSE    POCT Blood Glucose.: 150 mg/dL (17 Jan 2025 18:57)      LOS: 1d    VITALS:   T(C): 37.1 (01-17-25 @ 18:15), Max: 38.2 (01-17-25 @ 12:15)  HR: 88 (01-17-25 @ 21:05) (70 - 134)  BP: 204/113 (01-17-25 @ 18:15) (129/71 - 204/113)  RR: 18 (01-17-25 @ 18:15) (18 - 20)  SpO2: 100% (01-17-25 @ 21:05) (91% - 100%)    GENERAL: NAD, lying in bed comfortably  HEAD:  Atraumatic, Normocephalic  EYES: EOMI, PERRLA, conjunctiva and sclera clear  ENT: Moist mucous membranes  NECK: Supple, No JVD  CHEST/LUNG: Clear to auscultation bilaterally; No rales, rhonchi, wheezing, or rubs. Unlabored respirations  HEART: Regular rate and rhythm; No murmurs, rubs, or gallops  ABDOMEN: BSx4; Soft, nontender, nondistended  EXTREMITIES:  2+ Peripheral Pulses, brisk capillary refill. No clubbing, cyanosis, or edema  NERVOUS SYSTEM:  A&Ox3, no focal deficits   SKIN: No rashes or lesions    LINES:     HOSPITAL MEDICATIONS:  MEDICATIONS  (STANDING):  atorvastatin 20 milliGRAM(s) Oral at bedtime  cefTRIAXone   IVPB      cefTRIAXone   IVPB 1000 milliGRAM(s) IV Intermittent once  chlorhexidine 0.12% Liquid 15 milliLiter(s) Oral Mucosa every 12 hours  chlorhexidine 2% Cloths 1 Application(s) Topical <User Schedule>  escitalopram 15 milliGRAM(s) Oral daily  gabapentin Solution 300 milliGRAM(s) Oral two times a day  heparin   Injectable 7500 Unit(s) SubCutaneous every 12 hours  influenza  Vaccine (HIGH DOSE) 0.5 milliLiter(s) IntraMuscular once  lacosamide 200 milliGRAM(s) Oral two times a day  lacosamide Injectable 200 milliGRAM(s) IV Push once  lamoTRIgine 100 milliGRAM(s) Oral two times a day  levETIRAcetam  IVPB 1500 milliGRAM(s) IV Intermittent two times a day  LORazepam   Injectable 4 milliGRAM(s) IV Push once  lurasidone 40 milliGRAM(s) Oral at bedtime  norepinephrine Infusion 0.05 MICROgram(s)/kG/Min (9.78 mL/Hr) IV Continuous <Continuous>  oseltamivir 75 milliGRAM(s) Oral once  oseltamivir 30 milliGRAM(s) Oral two times a day  propofol Infusion 20 MICROgram(s)/kG/Min (12.5 mL/Hr) IV Continuous <Continuous>    MEDICATIONS  (PRN):    LABS:                        14.4   13.82 )-----------( 120      ( 17 Jan 2025 19:13 )             43.8     Hgb Trend: 14.4<--, 12.5<--, 11.6<--  01-17    139  |  101  |  32[H]  ----------------------------<  171[H]  4.7   |  14[L]  |  2.55[H]    Ca    9.2      17 Jan 2025 19:13  Phos  5.2     01-17  Mg     2.3     01-17    TPro  7.5  /  Alb  4.6  /  TBili  0.3  /  DBili  x   /  AST  75[H]  /  ALT  61[H]  /  AlkPhos  139[H]  01-17    Creatinine Trend: 2.55<--, 2.27<--, 2.79<--  PT/INR - ( 17 Jan 2025 19:13 )   PT: 11.0 sec;   INR: 0.96 ratio       PTT - ( 17 Jan 2025 19:13 )  PTT:40.6 sec  Urinalysis Basic - ( 17 Jan 2025 19:13 )    Color: x / Appearance: x / SG: x / pH: x  Gluc: 171 mg/dL / Ketone: x  / Bili: x / Urobili: x   Blood: x / Protein: x / Nitrite: x   Leuk Esterase: x / RBC: x / WBC x   Sq Epi: x / Non Sq Epi: x / Bacteria: x    Arterial Blood Gas:  01-17 @ 19:14  7.15/34/91/12/96.1/-16.0  ABG lactate: --  Arterial Blood Gas:  01-17 @ 14:50  7.25/31/112/14/97.3/-12.4  ABG lactate: --    Venous Blood Gas:  01-16 @ 11:22  7.29/48/40/23/68.0  VBG Lactate: 1.1  Venous Blood Gas:  01-16 @ 07:46  7.26/46/41/21/68.3  VBG Lactate: 2.6    MICROBIOLOGY:     RADIOLOGY & ADDITIONAL TESTS: reviewed       ASSESSMENT & PLAN:  69M w/ hx of metastatic testicular cancer (s/p L orchiectomy, on etoposide/cisplatin chemo, last dose 6/2024), epilepsy (grand-mal seizures), schizophrenia, developmental delay, HTN, HLD, VAZQUEZ, stage III CKD, severe obesity, secondary hyperparathyroidism, anemia, thrombocytopenia who presented to ED for fall, possible seizure prior. S/p 2 RRT for grand mal seizures, intubated for airway protection, admitted to MICU for further management.    ******NEUROLOGY************  #Status epilepticus  #Aspiration precautions  #Seizure precautions  - Pt s/p 2 RRTs for seizure activity, grand mal with b/l nystagmus and upper extremity jerking; 2 seizure episodes lasting 1-2 mins each with one minute of post ictal confusion in btwn  - Pt did not have tongue injuries or oral/mucosal lacerations on brief oral exam  - Head and neck exam with minor trauma to R forehead, CT scan neg for bleed  - Now intubated for status epilepticus  - Sedation with IV propofol and precedex, IV ativan PRN  - IV versed  - Discussed with neuroepilepsy, 200mg vimpat to be loaded by neuro team  - Pending EEG  - Continue antiepileptic drugs: keppra 1.5g bid, lamotrigine 100mg bid, gabapentin 300mg bid    ******CARDIOVASCULAR************  - Hemodynamically stable, no pressor requirements currently  - Recent TTE obtained showing normal LV fxn i/s/o testicular cancer chemo treatment    ******PULMONARY************  #Airway precautions  - No aspiration noted during intubation, no tongue-bite, brief oral exam neg for any injury/trauma  - Intubated for airway protection and aspiration precuations during status epilepticus  - Wean down FIO2 to minimum  - CXR to ensure ET and OG tube positioning  - Titrate vent settings based on SpO2 and blood gas  - Head elevation to 15-30 degrees    ******INFECTIOUS DISEASE************  #Aspiration PNA  #Flu A positive  - Pt now post-intubation  - Will cover for possible aspiration PNA with CTX 1g daily x7 days (1/17- )  - Tamiflu 30mg bid x5 days for Flu positive  - Bronch culture    ******GASTROINTESTINAL************  - OG tube placed, however not feeding until seizure ruled out due to risk of aspiration  - Can use OG tube for meds    ******RENAL************  #Metabolic acidosis  #Lactic acidosis  - HCO3 12, pH 7.15, lactate 9.1 as of 1/17 PM  - Will repeat blood gas and BMP q4h    #At risk of rhabdomyolysis  - Will monitor CK    ******ENDOCRINE************  #At risk of hypoglycemia  - Pt NPO except meds due to c/f aspiration i/s/o seizure  - d5 NS or d5 LR, 50-60 cc/h to prevent hypoglycemia    #Hx of secondary hyperparathyroidism    ******HEME************  #DVT ppx  - subQ heparin    GOC: Full Code       Critical Care Attending Attestation:   69M Hx Metastatic Testicular CA s/p lt Orchiectomy and CXT, Childhood Seizure, Schizophrenia, Developmental Delay, Obesity, VAZQUEZ, CKD3, Secondary Hypothyroidism, Anemia and Thrombocytopenia in RRTs for Recurrent Seizures eventually intubated for Airway Protection iso Status Epilepticus.   - Acute Hypoxic Respiratory Failure on Vent Support   - Sedation on IV Propofol and IV Versed Gtt pending CXR, ABG and vEEG   - Aspiration and Seizure Precaution, Neuro Check per ICU Protocol    - AEDs for Status Epilepticus/ GTC seizures without tongue injury, urination   - IV Hydration and Hypoglycemia Precaution   - Empiric ABx and Tamiflue Coverage for Aspiration PNA and Influenza A+ve  - Serial CBC, CMPs, PT/INR, Lactate, CPKs, Lactate    - OGT Insertion for PO Medications and AEDs   - Hold enteral feed till Seizure Control   - Epilepsy/Neuro and Hemo-Oncology Consults   - IV Hydration and monitor I&O, Rhabdomyolysis w/u   - DVT PPx - SQH   - GOC - Full Code       Patient seen and examined with ICU Resident/Fellow at bedside after lab data, medical records and radiology reports reviewed. I have read and agreeable in general with resident's Documented Note, Assessment and Management Plan which reflected my opinions from bedside round and discussion.   Total Critical Care Time = 45 Min excluding teaching and procedure activity. MICU Accept Note    CHIEF COMPLAINT: seizures    HPI / INTERVAL HISTORY:  69M w/ hx of metastatic testicular cancer (s/p L orchiectomy, on etoposide/cisplatin chemo, last dose 6/2024), epilepsy (grand-mal seizures), schizophrenia, developmental delay, HTN, HLD, VAZQUEZ, stage III CKD, severe obesity, secondary hyperparathyroidism, anemia, thrombocytopenia who presented to ED for fall, possible seizure prior. Sister at bedside providing history as well. Patient reports last seizure 3 years ago, is adherent with medications (on lamotrigine and keppra), states he believes he had a seizure at home while ambulating to get mail from his mailbox. States he doesn't typically have an aura, just 'blacks out' for about 5 minutes. Notes this time was found by neighbor with abrasion to forehead, R cheek. Unsure time down, denies tongue-biting, b/b incontinence. Normally ambulates with cane. Per ED triage note, on EMS arrival, appeared in post-ictal state. Endorsing R sided chest pressure, and neck tenderness post fall. States has been in normal state of health, eating/drinking well, did have some hot flashes/chills last night, but did not take temperature. Denies any f/c, cough, SOB, rhinorrhea, sore throat, or any other acute sx.     In ED received 100 mg lamotrigine, keppra 1g. CTH/C spine/MF largely unremarkable apart from  mild right supraorbital and premaxillary/buccal soft tissue swelling/hematomas. CXR without focal consolidation. CBC and BMP largely unremarkable; noted to be influenza positive. Trop elevated for which cardiology was consulted; recommended TTE.     RRT called again for seizure activity, grand mal with b/l nystagmus and upper extremity jerking. Pt had received 1g keppra PO prior to seizure, was due for 6 PM lamictal but unable to receive. Seizure abated, patient post ictal for about a minute before another episode lasting about 1-2 minutes occurred. Patient then had limited response to questioning, which was interpreted to be post-ictal period, no focal deficits. Notably his blood pressure was SBP 200s in the R arm and 140s in the L arm, which persisted on subsequent readings.     Neurology was contacted and recommended holding additional AEDs. Patient was given labetalol 10 mg for hypertensive emergency, IV acetaminophen for rectal temperature 102. Vitals initially improved but patient had significant bronchoconstriction, for which 2 Duonebs treatments were given. Patient developed a jerking motion of the RUE, which family member noted was prodrome for additional seizure, neurology contacted who came to bedside and evaluated patient, recommended obtaining AED levels and CT head. Patient had not received vancomycin as ordered at prior RRT 2/2 family concern about renal function, discussed with family and went ahead with vancomycin administration.     Patient transported to CT head and CT chest without event, but post scan, upon transfer to bed had another episode of grand mal seizure, initially monitored, but developed apnea and ativan 2 mg given with seizure breaking. Patient airway opened with jaw-thrust, patient returned to [address] [place] [address] and suctioned. Patient noted without gag upon OPA insertion, decision made to intubate and anesthesia called.     Patient started on propofol and levophed. Neurology reassessed and recommended 200 vimpat load. Patient then transferred to MICU.    PAST MEDICAL & SURGICAL HISTORY:  Hypertension, unspecified type  Other hyperlipidemia  History of epilepsy  Paranoid schizophrenia  Obstructive sleep apnea  Testicular cancer  H/O Spinal surgery    FAMILY HISTORY:    SOCIAL HISTORY:  Smoking: [  ] Never Smoked  [  ] Former Smoker (# packs x # years)  [  ] Current Smoker (# packs x # years)  Substance Use:   EtOH Use:   Marital Status: [  ] Single  [  ]   [  ]   [  ]   Sexual History:   Occupation:  Recent Travel:  Country of Birth:   Advance Directives:     HOME MEDICATIONS:    Allergies  penicillin (Hives)  seasonal allergies (Unknown)    Intolerances  latex (Rash)      REVIEW OF SYSTEMS:  Constitutional: No fevers, chills, weight loss, weight gain  HEENT: No vision problems, eye pain, nasal congestion, rhinorrhea, sore throat, dysphagia  CV: No chest pain, orthopnea, palpitations  Resp: No cough, dyspnea, wheezing, hemoptysis  GI: No nausea, vomiting, diarrhea, constipation, abdominal pain  : [ ] dysuria [ ] nocturia [ ] hematuria [ ] increased urinary frequency  Musculoskeletal: [ ] back pain [ ] myalgias [ ] arthralgias [ ] fracture  Skin: [ ] rash [ ] itch  Neurological: [ ] headache [ ] dizziness [ ] syncope [ ] weakness [ ] numbness  Psychiatric: [ ] anxiety [ ] depression  Endocrine: [ ] diabetes [ ] thyroid problem  Hematologic/Lymphatic: [ ] anemia [ ] bleeding problem  Allergic/Immunologic: [ ] itchy eyes [ ] nasal discharge [ ] hives [ ] angioedema  [ ] All other systems negative  [X] Unable to assess ROS because encephalopathy    OBJECTIVE:  ICU Vital Signs Last 24 Hrs  T(C): 37.1 (17 Jan 2025 18:15), Max: 38.2 (17 Jan 2025 12:15)  T(F): 98.7 (17 Jan 2025 18:15), Max: 100.7 (17 Jan 2025 12:15)  HR: 88 (17 Jan 2025 21:05) (70 - 134)  BP: 204/113 (17 Jan 2025 18:15) (129/71 - 204/113)  BP(mean): --  ABP: --  ABP(mean): --  RR: 18 (17 Jan 2025 18:15) (18 - 20)  SpO2: 100% (17 Jan 2025 21:05) (91% - 100%)    O2 Parameters below as of 17 Jan 2025 18:15  Patient On (Oxygen Delivery Method): nasal cannula  O2 Flow (L/min): 2      Mode: AC/ CMV (Assist Control/ Continuous Mandatory Ventilation), RR (machine): 20, TV (machine): 450, FiO2: 70, PEEP: 5, ITime: 1, MAP: 12, PIP: 30    01-16 @ 07:01  -  01-17 @ 07:00  --------------------------------------------------------  IN: 0 mL / OUT: 1800 mL / NET: -1800 mL    CAPILLARY BLOOD GLUCOSE    POCT Blood Glucose.: 150 mg/dL (17 Jan 2025 18:57)      LOS: 1d    VITALS:   T(C): 37.1 (01-17-25 @ 18:15), Max: 38.2 (01-17-25 @ 12:15)  HR: 88 (01-17-25 @ 21:05) (70 - 134)  BP: 204/113 (01-17-25 @ 18:15) (129/71 - 204/113)  RR: 18 (01-17-25 @ 18:15) (18 - 20)  SpO2: 100% (01-17-25 @ 21:05) (91% - 100%)    GENERAL: NAD, lying in bed comfortably  HEAD:  Atraumatic, Normocephalic  EYES: EOMI, PERRLA, conjunctiva and sclera clear  ENT: Moist mucous membranes  NECK: Supple, No JVD  CHEST/LUNG: Clear to auscultation bilaterally; No rales, rhonchi, wheezing, or rubs. Unlabored respirations  HEART: Regular rate and rhythm; No murmurs, rubs, or gallops  ABDOMEN: BSx4; Soft, nontender, nondistended  EXTREMITIES:  2+ Peripheral Pulses, brisk capillary refill. No clubbing, cyanosis, or edema  NERVOUS SYSTEM:  A&Ox3, no focal deficits   SKIN: No rashes or lesions    LINES:     HOSPITAL MEDICATIONS:  MEDICATIONS  (STANDING):  atorvastatin 20 milliGRAM(s) Oral at bedtime  cefTRIAXone   IVPB      cefTRIAXone   IVPB 1000 milliGRAM(s) IV Intermittent once  chlorhexidine 0.12% Liquid 15 milliLiter(s) Oral Mucosa every 12 hours  chlorhexidine 2% Cloths 1 Application(s) Topical <User Schedule>  escitalopram 15 milliGRAM(s) Oral daily  gabapentin Solution 300 milliGRAM(s) Oral two times a day  heparin   Injectable 7500 Unit(s) SubCutaneous every 12 hours  influenza  Vaccine (HIGH DOSE) 0.5 milliLiter(s) IntraMuscular once  lacosamide 200 milliGRAM(s) Oral two times a day  lacosamide Injectable 200 milliGRAM(s) IV Push once  lamoTRIgine 100 milliGRAM(s) Oral two times a day  levETIRAcetam  IVPB 1500 milliGRAM(s) IV Intermittent two times a day  LORazepam   Injectable 4 milliGRAM(s) IV Push once  lurasidone 40 milliGRAM(s) Oral at bedtime  norepinephrine Infusion 0.05 MICROgram(s)/kG/Min (9.78 mL/Hr) IV Continuous <Continuous>  oseltamivir 75 milliGRAM(s) Oral once  oseltamivir 30 milliGRAM(s) Oral two times a day  propofol Infusion 20 MICROgram(s)/kG/Min (12.5 mL/Hr) IV Continuous <Continuous>    MEDICATIONS  (PRN):    LABS:                        14.4   13.82 )-----------( 120      ( 17 Jan 2025 19:13 )             43.8     Hgb Trend: 14.4<--, 12.5<--, 11.6<--  01-17    139  |  101  |  32[H]  ----------------------------<  171[H]  4.7   |  14[L]  |  2.55[H]    Ca    9.2      17 Jan 2025 19:13  Phos  5.2     01-17  Mg     2.3     01-17    TPro  7.5  /  Alb  4.6  /  TBili  0.3  /  DBili  x   /  AST  75[H]  /  ALT  61[H]  /  AlkPhos  139[H]  01-17    Creatinine Trend: 2.55<--, 2.27<--, 2.79<--  PT/INR - ( 17 Jan 2025 19:13 )   PT: 11.0 sec;   INR: 0.96 ratio       PTT - ( 17 Jan 2025 19:13 )  PTT:40.6 sec  Urinalysis Basic - ( 17 Jan 2025 19:13 )    Color: x / Appearance: x / SG: x / pH: x  Gluc: 171 mg/dL / Ketone: x  / Bili: x / Urobili: x   Blood: x / Protein: x / Nitrite: x   Leuk Esterase: x / RBC: x / WBC x   Sq Epi: x / Non Sq Epi: x / Bacteria: x    Arterial Blood Gas:  01-17 @ 19:14  7.15/34/91/12/96.1/-16.0  ABG lactate: --  Arterial Blood Gas:  01-17 @ 14:50  7.25/31/112/14/97.3/-12.4  ABG lactate: --    Venous Blood Gas:  01-16 @ 11:22  7.29/48/40/23/68.0  VBG Lactate: 1.1  Venous Blood Gas:  01-16 @ 07:46  7.26/46/41/21/68.3  VBG Lactate: 2.6    MICROBIOLOGY:     RADIOLOGY & ADDITIONAL TESTS: reviewed       ASSESSMENT & PLAN:  69M w/ hx of metastatic testicular cancer (s/p L orchiectomy, on etoposide/cisplatin chemo, last dose 6/2024), epilepsy (grand-mal seizures), schizophrenia, developmental delay, HTN, HLD, VAZQUEZ, stage III CKD, severe obesity, secondary hyperparathyroidism, anemia, thrombocytopenia who presented to ED for fall, possible seizure prior. S/p 2 RRT for grand mal seizures, intubated for airway protection, admitted to MICU for further management.    ******NEUROLOGY************  #Status epilepticus  #Aspiration precautions  #Seizure precautions  - Pt s/p 2 RRTs for seizure activity, grand mal with b/l nystagmus and upper extremity jerking; 2 seizure episodes lasting 1-2 mins each with one minute of post ictal confusion in btwn  - Pt did not have tongue injuries or oral/mucosal lacerations on brief oral exam  - Head and neck exam with minor trauma to R forehead, CT scan neg for bleed  - Now intubated for status epilepticus  - Sedation with IV propofol and precedex, IV ativan PRN  - IV versed  - Discussed with neuroepilepsy, s/p 200mg vimpat load  - Pending EEG  - Continue antiepileptic drugs: keppra 1.5g bid, lamotrigine 100mg bid, gabapentin 300mg bid, vimpat 150 bid    #Schizophrenia  - Hold home meds while sedated    ******CARDIOVASCULAR************  #HTN  #HLD  - Hemodynamically stable, no pressor requirements currently  - Recent TTE obtained showing normal LV fxn i/s/o testicular cancer chemo treatment    ******PULMONARY************  #Airway precautions  - No aspiration noted during intubation, no tongue-bite, brief oral exam neg for any injury/trauma  - Intubated for airway protection and aspiration precuations during status epilepticus  - Wean down FIO2 to minimum  - CXR to ensure ET and OG tube positioning  - Titrate vent settings based on SpO2 and blood gas  - Head elevation to 15-30 degrees    #VAZQUEZ    ******INFECTIOUS DISEASE************  #Aspiration PNA  #Flu A positive  - Pt now post-intubation  - Will cover for possible aspiration PNA with renally dosed Zosyn x7 days (1/17- )  - Tamiflu 30mg bid x5 days for Flu positive  - F/u bronch culture    ******GASTROINTESTINAL************  #Diet: NPO except meds  - OG tube placed, however not feeding until seizure ruled out due to risk of aspiration  - Can use OG tube for meds    ******RENAL************  #Metabolic acidosis  #Lactic acidosis  #CKD Stage III  - HCO3 12, pH 7.15, lactate 9.1 as of 1/17 PM  - Will repeat blood gas and BMP q4h    #At risk of rhabdomyolysis  - Will monitor CK    ******ENDOCRINE************  #At risk of hypoglycemia  - Pt NPO except meds due to c/f aspiration i/s/o seizure  - FS q6h while NPO  - d5 NS or d5 LR, 50-60 cc/h to prevent hypoglycemia  - F/u TFTs    #Hx of secondary hyperparathyroidism    ******HEME/ONC************  #DVT ppx  - subQ heparin    #Metastatic testicular cancer  - On chemotherapy, most recent dose 6/2024    GOC: Full Code       Critical Care Attending Attestation:   69M Hx Metastatic Testicular CA s/p lt Orchiectomy and CXT, Childhood Seizure, Schizophrenia, Developmental Delay, Obesity, VAZQUEZ, CKD3, Secondary Hypothyroidism, Anemia and Thrombocytopenia in RRTs for Recurrent Seizures eventually intubated for Airway Protection iso Status Epilepticus.   - Acute Hypoxic Respiratory Failure on Vent Support   - Sedation on IV Propofol and IV Versed Gtt pending CXR, ABG and vEEG   - Aspiration and Seizure Precaution, Neuro Check per ICU Protocol    - AEDs for Status Epilepticus/ GTC seizures without tongue injury, urination   - IV Hydration and Hypoglycemia Precaution   - Empiric ABx and Tamiflue Coverage for Aspiration PNA and Influenza A+ve  - Serial CBC, CMPs, PT/INR, Lactate, CPKs, Lactate    - OGT Insertion for PO Medications and AEDs   - Hold enteral feed till Seizure Control   - Epilepsy/Neuro and Hemo-Oncology Consults   - IV Hydration and monitor I&O, Rhabdomyolysis w/u   - DVT PPx - SQH   - GOC - Full Code       Patient seen and examined with ICU Resident/Fellow at bedside after lab data, medical records and radiology reports reviewed. I have read and agreeable in general with resident's Documented Note, Assessment and Management Plan which reflected my opinions from bedside round and discussion.   Total Critical Care Time = 45 Min excluding teaching and procedure activity. MICU Accept Note    CHIEF COMPLAINT: seizures    HPI / INTERVAL HISTORY:  69M w/ hx of metastatic testicular cancer (s/p L orchiectomy, on etoposide/cisplatin chemo, last dose 6/2024), epilepsy (grand-mal seizures), schizophrenia, developmental delay, HTN, HLD, VAZQUEZ, stage III CKD, severe obesity, secondary hyperparathyroidism, anemia, thrombocytopenia who presented to ED for fall, possible seizure prior. Sister at bedside providing history as well. Patient reports last seizure 3 years ago, is adherent with medications (on lamotrigine and keppra), states he believes he had a seizure at home while ambulating to get mail from his mailbox. States he doesn't typically have an aura, just 'blacks out' for about 5 minutes. Notes this time was found by neighbor with abrasion to forehead, R cheek. Unsure time down, denies tongue-biting, b/b incontinence. Normally ambulates with cane. Per ED triage note, on EMS arrival, appeared in post-ictal state. Endorsing R sided chest pressure, and neck tenderness post fall. States has been in normal state of health, eating/drinking well, did have some hot flashes/chills last night, but did not take temperature. Denies any f/c, cough, SOB, rhinorrhea, sore throat, or any other acute sx.     In ED received 100 mg lamotrigine, keppra 1g. CTH/C spine/MF largely unremarkable apart from  mild right supraorbital and premaxillary/buccal soft tissue swelling/hematomas. CXR without focal consolidation. CBC and BMP largely unremarkable; noted to be influenza positive. Trop elevated for which cardiology was consulted; recommended TTE.     RRT called again for seizure activity, grand mal with b/l nystagmus and upper extremity jerking. Pt had received 1g keppra PO prior to seizure, was due for 6 PM lamictal but unable to receive. Seizure abated, patient post ictal for about a minute before another episode lasting about 1-2 minutes occurred. Patient then had limited response to questioning, which was interpreted to be post-ictal period, no focal deficits. Notably his blood pressure was SBP 200s in the R arm and 140s in the L arm, which persisted on subsequent readings.     Neurology was contacted and recommended holding additional AEDs. Patient was given labetalol 10 mg for hypertensive emergency, IV acetaminophen for rectal temperature 102. Vitals initially improved but patient had significant bronchoconstriction, for which 2 Duonebs treatments were given. Patient developed a jerking motion of the RUE, which family member noted was prodrome for additional seizure, neurology contacted who came to bedside and evaluated patient, recommended obtaining AED levels and CT head. Patient had not received vancomycin as ordered at prior RRT 2/2 family concern about renal function, discussed with family and went ahead with vancomycin administration.     Patient transported to CT head and CT chest without event, but post scan, upon transfer to bed had another episode of grand mal seizure, initially monitored, but developed apnea and ativan 2 mg given with seizure breaking. Patient airway opened with jaw-thrust, patient returned to [address] [place] [address] and suctioned. Patient noted without gag upon OPA insertion, decision made to intubate and anesthesia called.     Patient started on propofol and levophed. Neurology reassessed and recommended 200 vimpat load. Patient then transferred to MICU.    PAST MEDICAL & SURGICAL HISTORY:  Hypertension, unspecified type  Other hyperlipidemia  History of epilepsy  Paranoid schizophrenia  Obstructive sleep apnea  Testicular cancer  H/O Spinal surgery    FAMILY HISTORY:    SOCIAL HISTORY:  Smoking: [  ] Never Smoked  [  ] Former Smoker (# packs x # years)  [  ] Current Smoker (# packs x # years)  Substance Use:   EtOH Use:   Marital Status: [  ] Single  [  ]   [  ]   [  ]   Sexual History:   Occupation:  Recent Travel:  Country of Birth:   Advance Directives:     HOME MEDICATIONS:    Allergies  penicillin (Hives)  seasonal allergies (Unknown)    Intolerances  latex (Rash)      REVIEW OF SYSTEMS:  Constitutional: No fevers, chills, weight loss, weight gain  HEENT: No vision problems, eye pain, nasal congestion, rhinorrhea, sore throat, dysphagia  CV: No chest pain, orthopnea, palpitations  Resp: No cough, dyspnea, wheezing, hemoptysis  GI: No nausea, vomiting, diarrhea, constipation, abdominal pain  : [ ] dysuria [ ] nocturia [ ] hematuria [ ] increased urinary frequency  Musculoskeletal: [ ] back pain [ ] myalgias [ ] arthralgias [ ] fracture  Skin: [ ] rash [ ] itch  Neurological: [ ] headache [ ] dizziness [ ] syncope [ ] weakness [ ] numbness  Psychiatric: [ ] anxiety [ ] depression  Endocrine: [ ] diabetes [ ] thyroid problem  Hematologic/Lymphatic: [ ] anemia [ ] bleeding problem  Allergic/Immunologic: [ ] itchy eyes [ ] nasal discharge [ ] hives [ ] angioedema  [ ] All other systems negative  [X] Unable to assess ROS because encephalopathy    OBJECTIVE:  ICU Vital Signs Last 24 Hrs  T(C): 37.1 (17 Jan 2025 18:15), Max: 38.2 (17 Jan 2025 12:15)  T(F): 98.7 (17 Jan 2025 18:15), Max: 100.7 (17 Jan 2025 12:15)  HR: 88 (17 Jan 2025 21:05) (70 - 134)  BP: 204/113 (17 Jan 2025 18:15) (129/71 - 204/113)  BP(mean): --  ABP: --  ABP(mean): --  RR: 18 (17 Jan 2025 18:15) (18 - 20)  SpO2: 100% (17 Jan 2025 21:05) (91% - 100%)    O2 Parameters below as of 17 Jan 2025 18:15  Patient On (Oxygen Delivery Method): nasal cannula  O2 Flow (L/min): 2      Mode: AC/ CMV (Assist Control/ Continuous Mandatory Ventilation), RR (machine): 20, TV (machine): 450, FiO2: 70, PEEP: 5, ITime: 1, MAP: 12, PIP: 30    01-16 @ 07:01  -  01-17 @ 07:00  --------------------------------------------------------  IN: 0 mL / OUT: 1800 mL / NET: -1800 mL    CAPILLARY BLOOD GLUCOSE    POCT Blood Glucose.: 150 mg/dL (17 Jan 2025 18:57)      LOS: 1d    VITALS:   T(C): 37.1 (01-17-25 @ 18:15), Max: 38.2 (01-17-25 @ 12:15)  HR: 88 (01-17-25 @ 21:05) (70 - 134)  BP: 204/113 (01-17-25 @ 18:15) (129/71 - 204/113)  RR: 18 (01-17-25 @ 18:15) (18 - 20)  SpO2: 100% (01-17-25 @ 21:05) (91% - 100%)    GENERAL: Intubated & sedated  HEAD: Hematoma on R forehead  ENT: No tongue-bite injury, no oral/mucosal lacerations  NECK: Supple, No JVD  CHEST/LUNG: Mild wheezing b/l  HEART: Regular rate and rhythm  ABDOMEN: Soft, nontender, nondistended  EXTREMITIES:  2+ Peripheral Pulses, no edema  NERVOUS SYSTEM: Chemically sedated    LINES:     HOSPITAL MEDICATIONS:  MEDICATIONS  (STANDING):  atorvastatin 20 milliGRAM(s) Oral at bedtime  cefTRIAXone   IVPB      cefTRIAXone   IVPB 1000 milliGRAM(s) IV Intermittent once  chlorhexidine 0.12% Liquid 15 milliLiter(s) Oral Mucosa every 12 hours  chlorhexidine 2% Cloths 1 Application(s) Topical <User Schedule>  escitalopram 15 milliGRAM(s) Oral daily  gabapentin Solution 300 milliGRAM(s) Oral two times a day  heparin   Injectable 7500 Unit(s) SubCutaneous every 12 hours  influenza  Vaccine (HIGH DOSE) 0.5 milliLiter(s) IntraMuscular once  lacosamide 200 milliGRAM(s) Oral two times a day  lacosamide Injectable 200 milliGRAM(s) IV Push once  lamoTRIgine 100 milliGRAM(s) Oral two times a day  levETIRAcetam  IVPB 1500 milliGRAM(s) IV Intermittent two times a day  LORazepam   Injectable 4 milliGRAM(s) IV Push once  lurasidone 40 milliGRAM(s) Oral at bedtime  norepinephrine Infusion 0.05 MICROgram(s)/kG/Min (9.78 mL/Hr) IV Continuous <Continuous>  oseltamivir 75 milliGRAM(s) Oral once  oseltamivir 30 milliGRAM(s) Oral two times a day  propofol Infusion 20 MICROgram(s)/kG/Min (12.5 mL/Hr) IV Continuous <Continuous>    MEDICATIONS  (PRN):    LABS:                        14.4   13.82 )-----------( 120      ( 17 Jan 2025 19:13 )             43.8     Hgb Trend: 14.4<--, 12.5<--, 11.6<--  01-17    139  |  101  |  32[H]  ----------------------------<  171[H]  4.7   |  14[L]  |  2.55[H]    Ca    9.2      17 Jan 2025 19:13  Phos  5.2     01-17  Mg     2.3     01-17    TPro  7.5  /  Alb  4.6  /  TBili  0.3  /  DBili  x   /  AST  75[H]  /  ALT  61[H]  /  AlkPhos  139[H]  01-17    Creatinine Trend: 2.55<--, 2.27<--, 2.79<--  PT/INR - ( 17 Jan 2025 19:13 )   PT: 11.0 sec;   INR: 0.96 ratio       PTT - ( 17 Jan 2025 19:13 )  PTT:40.6 sec  Urinalysis Basic - ( 17 Jan 2025 19:13 )    Color: x / Appearance: x / SG: x / pH: x  Gluc: 171 mg/dL / Ketone: x  / Bili: x / Urobili: x   Blood: x / Protein: x / Nitrite: x   Leuk Esterase: x / RBC: x / WBC x   Sq Epi: x / Non Sq Epi: x / Bacteria: x    Arterial Blood Gas:  01-17 @ 19:14  7.15/34/91/12/96.1/-16.0  ABG lactate: --  Arterial Blood Gas:  01-17 @ 14:50  7.25/31/112/14/97.3/-12.4  ABG lactate: --    Venous Blood Gas:  01-16 @ 11:22  7.29/48/40/23/68.0  VBG Lactate: 1.1  Venous Blood Gas:  01-16 @ 07:46  7.26/46/41/21/68.3  VBG Lactate: 2.6    MICROBIOLOGY:     RADIOLOGY & ADDITIONAL TESTS: reviewed       ASSESSMENT & PLAN:  69M w/ hx of metastatic testicular cancer (s/p L orchiectomy, on etoposide/cisplatin chemo, last dose 6/2024), epilepsy (grand-mal seizures), schizophrenia, developmental delay, HTN, HLD, VAZQUEZ, stage III CKD, severe obesity, secondary hyperparathyroidism, anemia, thrombocytopenia who presented to ED for fall, possible seizure prior. S/p 2 RRT for grand mal seizures, intubated for airway protection, admitted to MICU for further management.    ******NEUROLOGY************  #Status epilepticus  #Aspiration precautions  #Seizure precautions  - Pt s/p 2 RRTs for seizure activity, grand mal with b/l nystagmus and upper extremity jerking; 2 seizure episodes lasting 1-2 mins each with one minute of post ictal confusion in btwn  - Pt did not have tongue injuries or oral/mucosal lacerations on brief oral exam  - Head and neck exam with minor trauma to R forehead, CT scan neg for bleed  - Now intubated for status epilepticus  - Sedation with IV propofol and precedex, IV ativan PRN  - IV versed  - Discussed with neuroepilepsy, s/p 200mg vimpat load  - Pending EEG  - Continue antiepileptic drugs: keppra 1.5g bid, lamotrigine 100mg bid, gabapentin 300mg bid, vimpat 150 bid    #Schizophrenia  - Hold home meds while sedated    ******CARDIOVASCULAR************  #HTN  #HLD  - Hemodynamically stable, no pressor requirements currently  - Recent TTE obtained showing normal LV fxn i/s/o testicular cancer chemo treatment    ******PULMONARY************  #Airway precautions  - No aspiration noted during intubation, no tongue-bite, brief oral exam neg for any injury/trauma  - Intubated for airway protection and aspiration precuations during status epilepticus  - Wean down FIO2 to minimum  - CXR to ensure ET and OG tube positioning  - Titrate vent settings based on SpO2 and blood gas  - Head elevation to 15-30 degrees    #VAZQUEZ    ******INFECTIOUS DISEASE************  #Aspiration PNA  #Flu A positive  - Pt now post-intubation  - Will cover for possible aspiration PNA with renally dosed Zosyn x7 days (1/17- )  - Tamiflu 30mg bid x5 days for Flu positive  - F/u bronch culture    ******GASTROINTESTINAL************  #Diet: NPO except meds  - OG tube placed, however not feeding until seizure ruled out due to risk of aspiration  - Can use OG tube for meds    ******RENAL************  #Metabolic acidosis  #Lactic acidosis  #CKD Stage III  - HCO3 12, pH 7.15, lactate 9.1 as of 1/17 PM  - Will repeat blood gas and BMP q4h    #At risk of rhabdomyolysis  - Will monitor CK    ******ENDOCRINE************  #At risk of hypoglycemia  - Pt NPO except meds due to c/f aspiration i/s/o seizure  - FS q6h while NPO  - d5 NS or d5 LR, 50-60 cc/h to prevent hypoglycemia  - F/u TFTs    #Hx of secondary hyperparathyroidism    ******HEME/ONC************  #DVT ppx  - subQ heparin    #Metastatic testicular cancer  - On chemotherapy, most recent dose 6/2024    GOC: Full Code       Critical Care Attending Attestation:   69M Hx Metastatic Testicular CA s/p lt Orchiectomy and CXT, Childhood Seizure, Schizophrenia, Developmental Delay, Obesity, VAZQUEZ, CKD3, Secondary Hypothyroidism, Anemia and Thrombocytopenia in RRTs for Recurrent Seizures eventually intubated for Airway Protection iso Status Epilepticus.   - Acute Hypoxic Respiratory Failure on Vent Support   - Sedation on IV Propofol and IV Versed Gtt pending CXR, ABG and vEEG   - Aspiration and Seizure Precaution, Neuro Check per ICU Protocol    - AEDs for Status Epilepticus/ GTC seizures without tongue injury, urination   - IV Hydration and Hypoglycemia Precaution   - Empiric ABx and Tamiflue Coverage for Aspiration PNA and Influenza A+ve  - Serial CBC, CMPs, PT/INR, Lactate, CPKs, Lactate    - OGT Insertion for PO Medications and AEDs   - Hold enteral feed till Seizure Control   - Epilepsy/Neuro and Hemo-Oncology Consults   - IV Hydration and monitor I&O, Rhabdomyolysis w/u   - DVT PPx - SQH   - GOC - Full Code       Patient seen and examined with ICU Resident/Fellow at bedside after lab data, medical records and radiology reports reviewed. I have read and agreeable in general with resident's Documented Note, Assessment and Management Plan which reflected my opinions from bedside round and discussion.   Total Critical Care Time = 45 Min excluding teaching and procedure activity.

## 2025-01-17 NOTE — PROGRESS NOTE ADULT - SUBJECTIVE AND OBJECTIVE BOX
SUBJECTIVE / OVERNIGHT EVENTS:      No events noted overnight. Remains on precautions      --------------------------------------------------------------------------------------------  LABS:                        12.5   6.72  )-----------( 104      ( 17 Jan 2025 07:02 )             38.5     01-16    137  |  103  |  44[H]  ----------------------------<  143[H]  4.5   |  17[L]  |  2.79[H]    Ca    9.8      16 Jan 2025 07:30    TPro  6.6  /  Alb  4.2  /  TBili  0.3  /  DBili  x   /  AST  23  /  ALT  24  /  AlkPhos  105  01-16      CAPILLARY BLOOD GLUCOSE            Urinalysis Basic - ( 16 Jan 2025 07:30 )    Color: x / Appearance: x / SG: x / pH: x  Gluc: 143 mg/dL / Ketone: x  / Bili: x / Urobili: x   Blood: x / Protein: x / Nitrite: x   Leuk Esterase: x / RBC: x / WBC x   Sq Epi: x / Non Sq Epi: x / Bacteria: x        RADIOLOGY & ADDITIONAL TESTS: < from: CT Cervical Spine No Cont (01.16.25 @ 11:16) >  IMPRESSION:    CT brain:  No acute intracranial hemorrhage, brain edema, or mass effect.  No displaced calvarial fracture.    CT cervical spine:  No acute fracture or traumatic subluxation.  No prevertebral soft tissue swelling.  Degenerative changes.    CT maxillofacial bones:  No acute fracture or traumatic subluxation.    Air-fluid level in the bilateral maxillary sinuses, correlate for the   presence of acute sinusitis.    Intraorbital contents unremarkable.    Relatively mild right supraorbital and premaxillary/buccal soft tissue   swelling/hematomas    < end of copied text >      Imaging Personally Reviewed:  [x] YES  [ ] NO    Consultant(s) Notes Reviewed:  [x] YES  [ ] NO    MEDICATIONS  (STANDING):  amLODIPine   Tablet 5 milliGRAM(s) Oral daily  atorvastatin 20 milliGRAM(s) Oral at bedtime  escitalopram 15 milliGRAM(s) Oral daily  gabapentin 300 milliGRAM(s) Oral two times a day  heparin   Injectable 7500 Unit(s) SubCutaneous every 12 hours  influenza  Vaccine (HIGH DOSE) 0.5 milliLiter(s) IntraMuscular once  lamoTRIgine 100 milliGRAM(s) Oral two times a day  levETIRAcetam 1000 milliGRAM(s) Oral two times a day  lisinopril 5 milliGRAM(s) Oral daily  lurasidone 40 milliGRAM(s) Oral at bedtime  sodium chloride 0.9%. 1000 milliLiter(s) (75 mL/Hr) IV Continuous <Continuous>    MEDICATIONS  (PRN):  acetaminophen     Tablet .. 650 milliGRAM(s) Oral every 6 hours PRN Temp greater or equal to 38C (100.4F), Mild Pain (1 - 3)      Care Discussed with Consultants/Other Providers [x] YES  [ ] NO    Vital Signs Last 24 Hrs  T(C): 37.2 (17 Jan 2025 05:42), Max: 37.7 (16 Jan 2025 16:29)  T(F): 99 (17 Jan 2025 05:42), Max: 99.9 (16 Jan 2025 22:30)  HR: 74 (17 Jan 2025 09:45) (68 - 86)  BP: 135/77 (17 Jan 2025 05:42) (129/71 - 157/84)  BP(mean): --  RR: 18 (17 Jan 2025 05:42) (16 - 20)  SpO2: 98% (17 Jan 2025 09:45) (94% - 98%)    Parameters below as of 17 Jan 2025 05:42  Patient On (Oxygen Delivery Method): room air      I&O's Summary    16 Jan 2025 07:01  -  17 Jan 2025 07:00  --------------------------------------------------------  IN: 0 mL / OUT: 1800 mL / NET: -1800 mL        PHYSICAL EXAM:  GENERAL: NAD, well-developed, comfortable  HEAD:  + abrasion   EYES: EOMI, PERRLA, conjunctiva and sclera clear  NECK: Supple, No JVD  CHEST/LUNG: Clear to auscultation bilaterally; No wheeze  HEART: Regular rate and rhythm; No murmurs, rubs, or gallops  ABDOMEN: Soft, Nontender, Nondistended; Bowel sounds present  NEURO: AAOx3, no focal weakness, 5/5 b/l extremity strength, b/l knee no arthritis, no effusion   EXTREMITIES:  2+ Peripheral Pulses, No clubbing, cyanosis, or edema  SKIN: No rashes or lesions

## 2025-01-17 NOTE — CHART NOTE - NSCHARTNOTEFT_GEN_A_CORE
: Sandra MEDINA    INDICATION: Assessment of cardiac function    PROCEDURE:  [X] LIMITED ECHO  [X] LIMITED CHEST  [ ] LIMITED RETROPERITONEAL  [ ] LIMITED ABDOMINAL  [ ] LIMITED DVT  [ ] NEEDLE GUIDANCE VASCULAR  [ ] NEEDLE GUIDANCE THORACENTESIS  [ ] NEEDLE GUIDANCE PARACENTESIS  [ ] NEEDLE GUIDANCE PERICARDIOCENTESIS  [ ] OTHER    FINDINGS:  Lungs:  B-lines throughout anterior apices bilaterally and areas of regular pleura. Lung sliding present. Limited views of bases. R sided small to moderate pleural effusion.   Heart: Limited views. RV LV grossly normal function. LV>RV.  LV appears to be hypertrophied. IVC 1.3 cm w/ moderate collapsibility.     INTERPRETATION:  No severe cardiac limitations observed.   B-lines predominant with moderate pleural effusion in R side. IVC 1.3 cm w/ respiratory variations, could benefit from fluid resuscitation. : Sandra MEDINA    INDICATION: Assessment of cardiac function    PROCEDURE:  [X] LIMITED ECHO  [X] LIMITED CHEST  [ ] LIMITED RETROPERITONEAL  [ ] LIMITED ABDOMINAL  [ ] LIMITED DVT  [ ] NEEDLE GUIDANCE VASCULAR  [ ] NEEDLE GUIDANCE THORACENTESIS  [ ] NEEDLE GUIDANCE PARACENTESIS  [ ] NEEDLE GUIDANCE PERICARDIOCENTESIS  [ ] OTHER    FINDINGS:  Lungs:  B-lines throughout anterior apices bilaterally and areas of regular pleura. Lung sliding present. Limited views of bases. R sided small to moderate pleural effusion.   Heart: Limited views. RV LV grossly normal function. LV>RV.  LV appears to be hypertrophied. IVC 1.3 cm w/ moderate collapsibility.     INTERPRETATION:  No severe cardiac limitations observed.   B-lines predominant with moderate pleural effusion in R side. IVC 1.3 cm w/ respiratory variations, could benefit from fluid resuscitation.       Attending Attestation:  I was present during the key portions of the procedure and immediately available during the entire procedure.     Benigno Delgado MD   Critical Care Attending

## 2025-01-17 NOTE — PATIENT PROFILE ADULT - FALL HARM RISK - HARM RISK INTERVENTIONS
Assistance with ambulation/Assistance OOB with selected safe patient handling equipment/Communicate Risk of Fall with Harm to all staff/Discuss with provider need for PT consult/Monitor gait and stability/Provide patient with walking aids - walker, cane, crutches/Reinforce activity limits and safety measures with patient and family/Sit up slowly, dangle for a short time, stand at bedside before walking/Tailored Fall Risk Interventions/Visual Cue: Yellow wristband and red socks/Bed in lowest position, wheels locked, appropriate side rails in place/Call bell, personal items and telephone in reach/Instruct patient to call for assistance before getting out of bed or chair/Non-slip footwear when patient is out of bed/[place] to call system/Physically safe environment - no spills, clutter or unnecessary equipment/Purposeful Proactive Rounding/Room/bathroom lighting operational, light cord in reach

## 2025-01-17 NOTE — PROGRESS NOTE ADULT - ASSESSMENT
69M w/ hx of metastatic testicular cancer (s/p L orchiectomy, on etoposide/cisplatin chemo), epilepsy (grand-mal seizures), schizophrenia, developmental delay, HTN, HLD, VAZQUEZ, stage III CKD, severe obesity, secondary hyperparathyroidism, anemia, thrombocytopenia who presented to ED for fall, possible seizure prior.    Plan:    # Syncopal seizure:  -  Patient with hx of epilepsy, generalized grand mal seizures  - C/w Lamictal and Keppra  - CTH/C spine/MF negative for acute ICH, notable for mild R hematoma  - Neuro checks  - Fall precautions  - Check orthos  - MRI head pending  - Rule out post ictal activity with rEEG  - Neuro following    # Flu:  - Asymptomatic  - Continue off antimicrobials  - C/w supportive care  - ID following    # Elevated trops:  - Continue to trend  - Echo pending  - Ischemic assessment pending after TTE  - Cards following    # CKD3:  - Monitor I/O's  - Serial Cr  - Avoid nephrotoxins  - Renally dose medications    # HTN/ HLD:  - C/w home CV meds    # Schizophrenia:  - C/w home meds    # VAZQUEZ:  - Cpap at night    # Hx of metastatic testicular cancer, s/p L orchiectomy, on etoposide/cisplatin chemo, last dose on 6/21/24:  - Outpt Oncology follow-up.    # DVT ppx:  - Heparin sq    [facility]  [phone]

## 2025-01-17 NOTE — PROGRESS NOTE ADULT - SUBJECTIVE AND OBJECTIVE BOX
INTERVAL EVENTS/SUBJ:  No events. no chest pain. complaining of forehead pain.     Home Medications:  atorvastatin 20 mg oral tablet: 1 tab(s) orally once a day (16 Jan 2025 17:20)  cholecalciferol 25 mcg (1000 intl units) oral tablet: 1 tab(s) orally once a day (16 Jan 2025 17:21)  Claritin 10 mg oral tablet: 1 tab(s) orally once a day (in the afternoon) (16 Jan 2025 17:11)  empagliflozin 10 mg oral tablet: 1 tab(s) orally once a day (16 Jan 2025 17:21)  Keppra 1000 mg oral tablet: 1 tab(s) orally 2 times a day (16 Jan 2025 17:20)  Latuda 40 mg oral tablet: 1 tab(s) orally once a day (at bedtime) (16 Jan 2025 17:20)  Lexapro 10 mg oral tablet: 1 tab(s) orally once a day +5mg tablet; TOTAL: 15mg (16 Jan 2025 17:20)  Lexapro 5 mg oral tablet: 1 tab(s) orally once a day +10mg; TOTA: 15mg (16 Jan 2025 17:20)  lisinopril 5 mg oral tablet: 1 tab(s) orally once a day (16 Jan 2025 17:21)  Neurontin 300 mg oral capsule: 1 cap(s) orally 2 times a day (16 Jan 2025 17:20)      MEDICATIONS  (STANDING):  amLODIPine   Tablet 5 milliGRAM(s) Oral daily  atorvastatin 20 milliGRAM(s) Oral at bedtime  escitalopram 15 milliGRAM(s) Oral daily  gabapentin 300 milliGRAM(s) Oral two times a day  heparin   Injectable 7500 Unit(s) SubCutaneous every 12 hours  influenza  Vaccine (HIGH DOSE) 0.5 milliLiter(s) IntraMuscular once  lamoTRIgine 100 milliGRAM(s) Oral two times a day  levETIRAcetam 1000 milliGRAM(s) Oral two times a day  lisinopril 5 milliGRAM(s) Oral daily  lurasidone 40 milliGRAM(s) Oral at bedtime  sodium chloride 0.9%. 1000 milliLiter(s) (75 mL/Hr) IV Continuous <Continuous>    MEDICATIONS  (PRN):  acetaminophen     Tablet .. 650 milliGRAM(s) Oral every 6 hours PRN Temp greater or equal to 38C (100.4F), Mild Pain (1 - 3)      Vital Signs Last 24 Hrs  T(C): 37.2 (17 Jan 2025 05:42), Max: 37.7 (16 Jan 2025 16:29)  T(F): 99 (17 Jan 2025 05:42), Max: 99.9 (16 Jan 2025 22:30)  HR: 74 (17 Jan 2025 09:45) (70 - 86)  BP: 135/77 (17 Jan 2025 05:42) (129/71 - 157/84)  BP(mean): --  RR: 19 (17 Jan 2025 08:00) (16 - 19)  SpO2: 98% (17 Jan 2025 09:45) (94% - 98%)    Parameters below as of 17 Jan 2025 08:00  Patient On (Oxygen Delivery Method): room air        PHYSICAL EXAM:  Constitutional/Appearance: Normal, Well-developed. bandaged wound to forehead.  Cardiovascular: Normal S1 S2, RRR, no mrg. No edema. chest wall ttp  Respiratory: Lungs clear to auscultation, respirations non-labored  Psychiatry: appropriate mood and affect.   Neurologic: Non-focal  Vascular: Peripheral pulses palpable 2+ bilaterally (radial)    LABS:  CBC Full  -  ( 17 Jan 2025 07:02 )  WBC Count : 6.72 K/uL  RBC Count : 3.91 M/uL  Hemoglobin : 12.5 g/dL  Hematocrit : 38.5 %  Platelet Count - Automated : 104 K/uL  Mean Cell Volume : 98.5 fl  Mean Cell Hemoglobin : 32.0 pg  Mean Cell Hemoglobin Concentration : 32.5 g/dL  Auto Neutrophil # : x  Auto Lymphocyte # : x  Auto Monocyte # : x  Auto Eosinophil # : x  Auto Basophil # : x  Auto Neutrophil % : x  Auto Lymphocyte % : x  Auto Monocyte % : x  Auto Eosinophil % : x  Auto Basophil % : x      01-17    137  |  103  |  32[H]  ----------------------------<  116[H]  5.6[H]   |  20[L]  |  2.27[H]    Ca    9.4      17 Jan 2025 12:49    TPro  6.6  /  Alb  4.2  /  TBili  0.3  /  DBili  x   /  AST  23  /  ALT  24  /  AlkPhos  105  01-16      Vital Signs Last 24 Hrs  T(C): 37.2 (17 Jan 2025 05:42), Max: 37.7 (16 Jan 2025 16:29)  T(F): 99 (17 Jan 2025 05:42), Max: 99.9 (16 Jan 2025 22:30)  HR: 74 (17 Jan 2025 09:45) (70 - 86)  BP: 135/77 (17 Jan 2025 05:42) (129/71 - 157/84)  BP(mean): --  RR: 19 (17 Jan 2025 08:00) (16 - 19)  SpO2: 98% (17 Jan 2025 09:45) (94% - 98%)    Parameters below as of 17 Jan 2025 08:00  Patient On (Oxygen Delivery Method): room air

## 2025-01-17 NOTE — PROGRESS NOTE ADULT - SUBJECTIVE AND OBJECTIVE BOX
*************************************  NEUROLOGY PROGRESS NOTE  **************************************    OSCAR MILAN  Male  MRN-[id number]    HPI:  Patient OSCAR MILAN is a 69y (1955) with a PMHx significant for Epilepsy (febrile sz in childhood, GTC in adulthood, on Keppra, Lamictal and Gabapentin), metastatic testicular cancer on etoposide/cisplatin chemo, s/p L orchidectomy, hyperparathyroidism, obesity, VAZQUEZ, schizophrenia comes in after event of LOC in AM. At around 0500, he was near the mailbox, he was up since 0200, was feeling tired, next thing he remembers is being on floor, his neighbor asking him to keep laying on the floor, he was feeling a bit groggy but remembers going back into his apartment being transported by the EMS, and even gave sister's phone number to EMS. Unclear time period. He denies any tongue bite, urinary or bowel incontinence. He did hit his head and has some bruises but denies any pain. Previous episode, as witnessed by sister was 2023, when she noticed head turn, screaming sound, whole body stiffening, was due to medication(lamotrigine) down titration, was sent home on current dose, has been compliant since. He has been following with Dr. Lopez. He is on Keppra 1g BID, Lamotrigine 100mg BID, Gabapentin 300mg BID. No recent med changes.    Interval hx:  Patient had a RRT called around 1500, for unresponsiveness, eyes rolling back and forth, concern for seizure, vital signs showed rectal temp of 102, , /106, lab work showed LA 5.6, he was started on abx and fluids. He had repeat RRT called around 1900, with him being unresponsive, right head turning, whole body stiffening and convulsive movements of all 4 limbs (as described by sister and RN), it lasted for about 3 minutes, resolved itself without ASM, but he was still confused, on evaluation, he was intermittently blanking out and starring but then able to answer questions, oriented x3, able to repeat sentences but appeared SOB, able to follow commands and move all extremities antigravity. VS /150, HR 96, spo2 89. Lab work showed WBC 13.82, HCO3 14, AG 24, LA 9, CK 1015, .         -------------------------------------------------------------------------------------------------------------------------------------------------------------------------------------------------------------------------    Objective:    VITAL SIGNS:  Vital Signs Last 24 Hrs  T(C): 38.1 (17 Jan 2025 16:30), Max: 38.2 (17 Jan 2025 12:15)  T(F): 100.5 (17 Jan 2025 16:30), Max: 100.7 (17 Jan 2025 12:15)  HR: 82 (17 Jan 2025 16:47) (70 - 91)  BP: 129/71 (17 Jan 2025 16:47) (129/71 - 156/75)  BP(mean): --  RR: 18 (17 Jan 2025 16:47) (18 - 20)  SpO2: 94% (17 Jan 2025 16:47) (91% - 98%)    Parameters below as of 17 Jan 2025 16:47  Patient On (Oxygen Delivery Method): nasal cannula  O2 Flow (L/min): 2      PHYSICAL EXAMINATION:    General: Obese, appears in distress, SOB  Eyes: Conjunctiva and sclera clear.  Neck: supple, nontender, good ROM  Lungs: SOB  Neurologic:  - Mental Status:  Alert, awake, oriented to person, place, and time; Speech is fluent but fragmented due to distress with intact naming, repetition, fluency, initially episodes of starring and decreased responsiveness.  - Cranial Nerves II-XII:  BTT intact b/l, No nystagmus, EOMI, PERRLA 2mm b/l, V1-V3 intact, no facial asymmetry, t/p mild deviation to right, bruises on tongue,   - Motor:  Strength is 4/5 throughout.  There is no pronator drift.  Normal muscle bulk and tone throughout.  - Reflexes:  1+ and symmetric at the biceps, triceps, brachioradialis, knees, and ankles.  Plantar responses mute.  - Sensory:  Intact to light touch sense throughout.  - Coordination:  unable to test due to acuity  - Gait:   unable to test due to acuity    --------------------------------------------------------------------------------------------------------------------------------------------------------------------------------------------------------------------------    LABS:                          14.4   13.82 )-----------( 120      ( 17 Jan 2025 19:13 )             43.8     01-17    139  |  101  |  32[H]  ----------------------------<  171[H]  4.7   |  14[L]  |  2.55[H]    Ca    9.2      17 Jan 2025 19:13  Phos  5.2     01-17  Mg     2.3     01-17    TPro  7.5  /  Alb  4.6  /  TBili  0.3  /  DBili  x   /  AST  75[H]  /  ALT  61[H]  /  AlkPhos  139[H]  01-17          RADIOLOGY & ADDITIONAL STUDIES:    MR Head No Cont:  (17 Jan 2025 13:59)    FINDINGS:    Study is a foreshortened and limited based on motion artifact.    No large territorial focus of diffusion restriction to suggest acute infarct. No hydrocephalus. Foci of increased FLAIR signal in the deep and periventricular white matter, compatible with chronic small vessel disease. Limited evaluation of the extra-axial spaces, however no significant midline shift.    The craniocervical junction is within normal limits to the extent visualized. The sella is not expanded. Limited evaluation of the paranasal sinuses, mastoid air cells, major intracranial flow voids and orbits.      IMPRESSION:    Study is a foreshortened and limited based on motion artifact.    1. No evidence of large territorial acute infarct or midline shift.  2. Chronic small vessel disease.    CT Head No Cont:  (16 Jan 2025 11:15)

## 2025-01-17 NOTE — PROGRESS NOTE ADULT - CRITICAL CARE ATTENDING COMMENT
Interval History as per resident note, personally verified by me. Patient with multiple RRT's called on 1/17 for unresponsiveness and seizure-like activity. Most recent on 21:00 following getting CT's and there was concern for inability to protect airway so he was intubated and sedated. Vimpat started. No reports of additional seizure-like activity and no new focal neurologic deficits.    GTT:  Precedex 0.6 mcg/kg/hour    ROS: Due to clinical condition unable to assess (MADELINE)    Neurologic exam as per resident note with additions as below:  MS - Intubated, sedated, comatose, no speech output, does not follow commands. MADELINE orientation, rep/naming, attn/conc/recent and remote memory/fund of knowledge  CN - PERRL, EOMI by OCR, (+) face sens/str by corneals b/l, (-) spontaneous respirations (vent rate 18 bpm), (+) cough. MADELINE VF, hearing, tongue/palate, trap/SCM  Motor - Normal bulk and dec tone throughout. No spontaneous movements noted. No grimace or withdrawal to strong tactile stimuli in any extremity  Sens - As per Motor above  DTR's - 2+ BR b/l, 0+ rest, and upgoing R and neutral L plantar response  Coord - MADELINE  Gait and station - MADELINE    Pertinent labs/studies:  CBC with inc WBC 17.77, low H/H 12/36 and MCV WNL, low plt 114  PT/INR WNL, PTT dec 23.9  BMP with low Na 133, inc BUN/Cr 33/2.56 (GFR dec 27), otherwise essentially WNL  Albumin 3.7, LFT's with inc ALT/AST 52/66  Mg WNL, Phos WNL  CPK inc 1011, UA (-)    vEEG 1/18 -  EEG Classification:    Abnormal EEG in lethargic state  1. Moderate diffuse background slowing    -------------------------------------------------------------------------------------------------------  EEG Impression / Clinical Correlate:    Abnormal prolonged EEG study due to Moderate diffuse cerebral dysfunction that is not specific in etiology or at least partly due to medications    No epileptic discharges recorded.  No seizures recorded.    < from: CT Head No Cont (01.17.25 @ 20:08) >    No CT evidence of acute intracranial pathology.    Scattered fecal thickening.  Air-fluid levels in the maxillary sinuses   may represent trapped secretions versus sinusitis.    Nonspecific trace left mastoid effusion.    < end of copied text >      < from: CT Head No Cont (01.16.25 @ 11:15) >    CT brain:  No acute intracranial hemorrhage, brain edema, or mass effect.  No displaced calvarial fracture.    CT cervical spine:  No acute fracture or traumatic subluxation.  No prevertebral soft tissue swelling.  Degenerative changes.    CT maxillofacial bones:  No acute fracture or traumatic subluxation.    Air-fluid level in the bilateral maxillary sinuses, correlate for the   presence of acute sinusitis.    Intraorbital contents unremarkable.    Relatively mild right supraorbital and premaxillary/buccal soft tissue   swelling/hematomas    < end of copied text >    A/P:  Syncope  Influenza A infection  Epilepsy, intractable, without status epilepticus  Metastatic testicular cancer  VAZQUEZ  Schizophrenia  HTN  Hyponatremia  SHAGUFTA  Transaminitis  Bicytopenia    - Patient with syncopal event in the setting of influenza A infection, likely from toxic/metabolic or peripheral process with acute medical issues but could also be cardiac. Seizure cannot be entirely excluded but much lower on the differential; if it did happen would favor provoked in the above setting. He is back to neurologic baseline without any recurrent events, focal neurologic deficits, or abnormal movements. CT head and CT c-spine, personally reviewed by me, with cervical degenerative changes but no acute intracranial or spinal findings. 1/18 - Multiple RRT's for seizures and unresponsiveness on 1/17, intubated and transferred to MICU. No further seizure like activity. Repeat CT head, personally reviewed by me, stable  - vEEG with moderate generalized slowing and no evidence for focal slowing, epileptiform discharges, or seizures. Continue for additional 24 hours (1/19) and can then stop if unrevealing  - Continue current ASM's - Keppra 1.5g PO/IV BID, lamotrigine 100mg PO BID, gabapentin 300mg PO BID, Vimpat 150mg PO/IV BID  - Orthostatic vital signs  - Taper sedation, as above  - Above recommendations discussed with MICU team, who verbalized agreement and understanding  - Continue to address above medical problems, as you are doing  - Will continue to follow patient with you

## 2025-01-17 NOTE — RAPID RESPONSE TEAM SUMMARY - NSADDTLFINDINGSRRT_GEN_ALL_CORE
As above
VS: Rectal temp 103.2, , /106, saturating well on room air, though with gasping sounds when asleep  PE: Initially with eye rolling, then following commands, no focal neurological deficits.

## 2025-01-17 NOTE — PATIENT PROFILE ADULT - FALL HARM RISK - DATE OF FALL
· Lipid panel 06/24/2022- Cholesterol-222, triglycerides-98, HDL-42, LDL-160  · Continue atorvastatin 10 mg   · Lipid panel 16-Jan-2025

## 2025-01-17 NOTE — CONSULT NOTE ADULT - ASSESSMENT
Patient is a 69 year old male with PMH of metastatic testicular cancer (s/p L orchiectomy, on etoposide/cisplatin chemo, last dose 6/2024), epilepsy (grand-mal seizures), schizophrenia, developmental delay, HTN, HLD, VAZQUEZ, stage III CKD, severe obesity, secondary hyperparathyroidism, anemia, thrombocytopenia who presented to ED for fall, possible seizure prior.   Admitted for further work up for syncope, c/f seizure   Influenza A  - tested FluA positive on admission  - afebrile, WBC wnl, saturating well on RA  - reports cough for few weeks, no other resp sx  - CXR with clear lungs, no pneumonia   - unclear onset of infection, feels ok from resp standpoint, does not want tamiflu    Recommendations:   Continue off antimicrobials  Supportive care  Isolation per IC protocol  Neurology following, MRI/EEG pending   Cardiology following   Continue rest of care per primary team     Over the weekend Dr. Shiraz Liu will be covering for our group. If you have any questions, concerns or new micro data please reach out to them using TEAMS *PREFERRED* or by calling our service at [phone].     Greyson Mart M.D.  [facility]  Available on [employer] TEAMS - *PREFERRED*  [phone]  After 5pm on weekdays and all day on weekends - please call [phone]

## 2025-01-17 NOTE — CONSULT NOTE ADULT - SUBJECTIVE AND OBJECTIVE BOX
[facility]  JACINTO Horton S. Shah, Y. Patel, G. [facility]  [phone]  ([phone] - weekdays after 5pm and weekends)    OSCAR MILAN  69y, Male  [id number]    HPI:  Patient is a 69 year old male with PMH of metastatic testicular cancer (s/p L orchiectomy, on etoposide/cisplatin chemo, last dose 6/2024), epilepsy (grand-mal seizures), schizophrenia, developmental delay, HTN, HLD, VAZQUEZ, stage III CKD, severe obesity, secondary hyperparathyroidism, anemia, thrombocytopenia who presented to ED for fall, possible seizure prior. Sister at bedside providing history as well. Patient reports last seizure 3 years ago, is adherent with medications (on lamotrigine and keppra), states he believes he had a seizure at home while ambulating to get mail from his mailbox. States he doesn't typically have an aura, just 'blacks out' for about 5 minutes. Notes this time was found by neighbor with abrasion to forehead, R cheek. Unsure time down, denies tongue-biting, b/b incontinence. Normally ambulates with cane. Per ED triage note, on EMS arrival, appeared in post-ictal state. Endorsing R sided chest pressure, and neck tenderness post fall. States has been in normal state of health, eating/drinking well, did have some hot flashes/chills last night, but did not take temperature. Denies any f/c, cough, SOB, rhinorrhea, sore throat, or any other acute sx. In ED received 100 mg lamotrigine, keppra 1g. CTH/C spine/MF largely unremarkable apart from  mild right supraorbital and premaxillary/buccal soft tissue swelling/hematomas. CXR without focal consolidation. CBC and BMP largely unremarkable; noted to be influenza positive. Trop elevated for which cardiology was consulted; recommended TTE. Medicine called for admission.  ROS: 14 point review of systems completed, pertinent positives and negatives as per HPI.    Allergies: penicillin (Hives)  seasonal allergies (Unknown)    PMH -- Hypertension, unspecified type  Other hyperlipidemia  History of epilepsy  Paranoid schizophrenia  Obstructive sleep apnea  Testicular cancer    PSH -- H/O Spinal surgery  FH -- noncontributory   Social History -- former smoker, denies alcohol or illicit drug use    Physical Exam--  Vital Signs Last 24 Hrs  T(F): 99.9 (17 Jan 2025 04:11), Max: 99.9 (16 Jan 2025 22:30)  HR: 70 (17 Jan 2025 04:11) (68 - 87)  BP: 129/77 (17 Jan 2025 04:11) (126/76 - 157/84)  RR: 18 (17 Jan 2025 04:11) (16 - 20)  SpO2: 97% (17 Jan 2025 04:11) (92% - 98%)  General: no acute distress  HEENT: NC/AT, EOMI, anicteric  Lungs: clear to auscultation bilaterally  Heart: S1, S2 present, normal rate/rhythm  Abdomen: Soft. ND. NT. BS present.   Neuro: AAOx3, no obvious focal deficits   Extremities: No cyanosis. No edema.   Skin: Warm. Dry. No visible rash.   Lines: PIV     Laboratory & Imaging Data--  CBC:                       11.6   8.38  )-----------( 135      ( 16 Jan 2025 07:30 )             36.6     WBC Count: 8.38 K/uL (01-16-25 @ 07:30)    CMP: 01-16    137  |  103  |  44[H]  ----------------------------<  143[H]  4.5   |  17[L]  |  2.79[H]    Ca    9.8      16 Jan 2025 07:30    TPro  6.6  /  Alb  4.2  /  TBili  0.3  /  DBili  x   /  AST  23  /  ALT  24  /  AlkPhos  105  01-16    LIVER FUNCTIONS - ( 16 Jan 2025 07:30 )  Alb: 4.2 g/dL / Pro: 6.6 g/dL / ALK PHOS: 105 U/L / ALT: 24 U/L / AST: 23 U/L / GGT: x           Microbiology: reviewed  FluA/FluB/RSV/COVID PCR (01.16.25 @ 08:24)    SARS-CoV-2 Result: NotDetec: This Respiratory Panel uses polymerase chain reaction (PCR) to detect for  influenza A; influenza B; respiratory syncytial virus; and SARS-CoV-2.  Test results should be correlated with clinical presentation, patient  history, and epidemiology.   Influenza A Result: Detected   Influenza B Result: [facility]   Resp Syn Virus Result: [facility]    Radiology--reviewed  < from: CT 3D Reconstruct w/ Workstation (01.16.25 @ 11:17) >  IMPRESSION:    CT brain:  No acute intracranial hemorrhage, brain edema, or mass effect.  No displaced calvarial fracture.    CT cervical spine:  No acute fracture or traumatic subluxation.  No prevertebral soft tissue swelling.  Degenerative changes.    CT maxillofacial bones:  No acute fracture or traumatic subluxation.    Air-fluid level in the bilateral maxillary sinuses, correlate for the   presence of acute sinusitis.    Intraorbital contents unremarkable.    Relatively mild right supraorbital and premaxillary/buccal soft tissue   swelling/hematomas    --- End of Report ---    < end of copied text >    < from: Xray Chest 1 View AP/PA (01.16.25 @ 09:52) >  FINDINGS:  Right chest wall Chemo-Port with tip overlying right atrium.  The heart is unable to be assessed on this projection.  The lungs are clear.  There is no pneumothorax or pleural effusion.  Partially visualized spinal fusion hardware.  No acute osseous abnormalities.    IMPRESSION:  No acute osseous abnormalities.  No pneumothorax.    --- End of Report ---    < end of copied text >      Active Medications--  acetaminophen     Tablet .. 650 milliGRAM(s) Oral every 6 hours PRN  amLODIPine   Tablet 5 milliGRAM(s) Oral daily  atorvastatin 20 milliGRAM(s) Oral at bedtime  escitalopram 15 milliGRAM(s) Oral daily  gabapentin 300 milliGRAM(s) Oral two times a day  heparin   Injectable 7500 Unit(s) SubCutaneous every 12 hours  influenza  Vaccine (HIGH DOSE) 0.5 milliLiter(s) IntraMuscular once  lamoTRIgine 100 milliGRAM(s) Oral two times a day  levETIRAcetam 1000 milliGRAM(s) Oral two times a day  lisinopril 5 milliGRAM(s) Oral daily  lurasidone 40 milliGRAM(s) Oral at bedtime  sodium chloride 0.9%. 1000 milliLiter(s) IV Continuous <Continuous>    Immunologic:   influenza  Vaccine (HIGH DOSE) 0.5 milliLiter(s) IntraMuscular once     [facility]  JACINTO Horton S. Shah, Y. Patel, G. [facility]  [phone]  ([phone] - weekdays after 5pm and weekends)    OSCAR MILAN  69y, Male  [id number]    HPI:  Patient is a 69 year old male with PMH of metastatic testicular cancer (s/p L orchiectomy, on etoposide/cisplatin chemo, last dose 6/2024), epilepsy (grand-mal seizures), schizophrenia, developmental delay, HTN, HLD, VAZQUEZ, stage III CKD, severe obesity, secondary hyperparathyroidism, anemia, thrombocytopenia who presented to ED for fall, possible seizure prior. Patient reports last seizure 3 years ago, is adherent with medications (on lamotrigine and keppra), states he believes he had a seizure at home while ambulating to get mail from his mailbox on his floor. States he doesn't typically have an aura, just 'blacks out' for about 5 minutes. Notes this time was found by neighbor with abrasion to forehead, R cheek and reports having pain in his right arm. Unsure time down, denies tongue-biting, bowel or bladder incontinence. Normally ambulates with cane. Per ED triage note, on EMS arrival, appeared in post-ictal state. Endorsing R sided chest pressure, and neck tenderness post fall. States has been in normal state of health, eating/drinking well, did have some hot flashes/chills the night before but did not take temperature. He reports a nonproductive cough for a few weeks. He denies fever, chills, dyspnea, rhinorrhea, sore throat, or any other acute symptoms. Patient this morning states he feels better, no fevers, chills or new complaints, feels cough is unchanged, no chest pain or dyspnea. Denies any known sick contacts.   ROS: 14 point review of systems completed, pertinent positives and negatives as per HPI.    Allergies: penicillin (Hives)  seasonal allergies (Unknown)    PMH -- Hypertension, unspecified type  Other hyperlipidemia  History of epilepsy  Paranoid schizophrenia  Obstructive sleep apnea  Testicular cancer    PSH -- H/O Spinal surgery  FH -- noncontributory   Social History -- former smoker, denies alcohol or illicit drug use    Physical Exam--  Vital Signs Last 24 Hrs  T(F): 99.9 (17 Jan 2025 04:11), Max: 99.9 (16 Jan 2025 22:30)  HR: 70 (17 Jan 2025 04:11) (68 - 87)  BP: 129/77 (17 Jan 2025 04:11) (126/76 - 157/84)  RR: 18 (17 Jan 2025 04:11) (16 - 20)  SpO2: 97% (17 Jan 2025 04:11) (92% - 98%)  General: no acute distress, obese, RA  HEENT: NC dressing over R brow, EOMI, anicteric  Lungs: clear to auscultation bilaterally  Heart: S1, S2 present, normal rate  Abdomen: Soft. nontender, nondistended   Neuro: AAOx3, no obvious focal deficits   Extremities: No cyanosis. No edema.   Skin: Warm. Dry. No visible rash.   Lines: PIV     Laboratory & Imaging Data--  CBC:                       11.6   8.38  )-----------( 135      ( 16 Jan 2025 07:30 )             36.6     WBC Count: 8.38 K/uL (01-16-25 @ 07:30)    CMP: 01-16    137  |  103  |  44[H]  ----------------------------<  143[H]  4.5   |  17[L]  |  2.79[H]    Ca    9.8      16 Jan 2025 07:30    TPro  6.6  /  Alb  4.2  /  TBili  0.3  /  DBili  x   /  AST  23  /  ALT  24  /  AlkPhos  105  01-16    LIVER FUNCTIONS - ( 16 Jan 2025 07:30 )  Alb: 4.2 g/dL / Pro: 6.6 g/dL / ALK PHOS: 105 U/L / ALT: 24 U/L / AST: 23 U/L / GGT: x           Microbiology: reviewed  FluA/FluB/RSV/COVID PCR (01.16.25 @ 08:24)    [facility]: This Res[facility] Panel uses polymerase chain reaction (PCR) to detect for  influenza A; influenza B; respiratory syncytial virus; and SARS-CoV-2.  Test results should be correlated with clinical presentation, patient  history, and epidemiology.   Influenza A Result: Detected   Influenza B Result: Not[facility]e   Resp Syn Virus Result: Not[facility]    Radiology--reviewed  < from: CT 3D Reconstruct w/ Workstation (01.16.25 @ 11:17) >  IMPRESSION:    CT brain:  No acute intracranial hemorrhage, brain edema, or mass effect.  No displaced calvarial fracture.    CT cervical spine:  No acute fracture or traumatic subluxation.  No prevertebral soft tissue swelling.  Degenerative changes.    CT maxillofacial bones:  No acute fracture or traumatic subluxation.    Air-fluid level in the bilateral maxillary sinuses, correlate for the   presence of acute sinusitis.    Intraorbital contents unremarkable.    Relatively mild right supraorbital and premaxillary/buccal soft tissue   swelling/hematomas    --- End of Report ---    < end of copied text >    < from: Xray Chest 1 View AP/PA (01.16.25 @ 09:52) >  FINDINGS:  Right chest wall Chemo-Port with tip overlying right atrium.  The heart is unable to be assessed on this projection.  The lungs are clear.  There is no pneumothorax or pleural effusion.  Partially visualized spinal fusion hardware.  No acute osseous abnormalities.    IMPRESSION:  No acute osseous abnormalities.  No pneumothorax.    --- End of Report ---    < end of copied text >      Active Medications--  acetaminophen     Tablet .. 650 milliGRAM(s) Oral every 6 hours PRN  amLODIPine   Tablet 5 milliGRAM(s) Oral daily  atorvastatin 20 milliGRAM(s) Oral at bedtime  escitalopram 15 milliGRAM(s) Oral daily  gabapentin 300 milliGRAM(s) Oral two times a day  heparin   Injectable 7500 Unit(s) SubCutaneous every 12 hours  influenza  Vaccine (HIGH DOSE) 0.5 milliLiter(s) IntraMuscular once  lamoTRIgine 100 milliGRAM(s) Oral two times a day  levETIRAcetam 1000 milliGRAM(s) Oral two times a day  lisinopril 5 milliGRAM(s) Oral daily  lurasidone 40 milliGRAM(s) Oral at bedtime  sodium chloride 0.9%. 1000 milliLiter(s) IV Continuous <Continuous>    Immunologic:   influenza  Vaccine (HIGH DOSE) 0.5 milliLiter(s) IntraMuscular once

## 2025-01-18 LAB
ALBUMIN SERPL ELPH-MCNC: 3.1 G/DL — LOW (ref 3.3–5)
ALP SERPL-CCNC: 92 U/L — SIGNIFICANT CHANGE UP (ref 40–120)
ALT FLD-CCNC: 37 U/L — SIGNIFICANT CHANGE UP (ref 10–45)
ANION GAP SERPL CALC-SCNC: 15 MMOL/L — SIGNIFICANT CHANGE UP (ref 5–17)
APPEARANCE UR: CLEAR — SIGNIFICANT CHANGE UP
APTT BLD: 34.3 SEC — SIGNIFICANT CHANGE UP (ref 24.5–35.6)
AST SERPL-CCNC: 49 U/L — HIGH (ref 10–40)
BACTERIA # UR AUTO: NEGATIVE /HPF — SIGNIFICANT CHANGE UP
BILIRUB SERPL-MCNC: 0.3 MG/DL — SIGNIFICANT CHANGE UP (ref 0.2–1.2)
BILIRUB UR-MCNC: NEGATIVE — SIGNIFICANT CHANGE UP
BUN SERPL-MCNC: 41 MG/DL — HIGH (ref 7–23)
CALCIUM SERPL-MCNC: 8.4 MG/DL — SIGNIFICANT CHANGE UP (ref 8.4–10.5)
CAST: 3 /LPF — SIGNIFICANT CHANGE UP (ref 0–4)
CHLORIDE SERPL-SCNC: 102 MMOL/L — SIGNIFICANT CHANGE UP (ref 96–108)
CK SERPL-CCNC: 1011 U/L — HIGH (ref 30–200)
CK SERPL-CCNC: 745 U/L — HIGH (ref 30–200)
CO2 SERPL-SCNC: 16 MMOL/L — LOW (ref 22–31)
COLOR SPEC: YELLOW — SIGNIFICANT CHANGE UP
CREAT SERPL-MCNC: 3.25 MG/DL — HIGH (ref 0.5–1.3)
DIFF PNL FLD: ABNORMAL
EGFR: 20 ML/MIN/1.73M2 — LOW
GAS PNL BLDA: SIGNIFICANT CHANGE UP
GLUCOSE SERPL-MCNC: 125 MG/DL — HIGH (ref 70–99)
GLUCOSE UR QL: >=1000 MG/DL
GRAM STN FLD: SIGNIFICANT CHANGE UP
HCT VFR BLD CALC: 31.8 % — LOW (ref 39–50)
HGB BLD-MCNC: 10.7 G/DL — LOW (ref 13–17)
INR BLD: 1.01 RATIO — SIGNIFICANT CHANGE UP (ref 0.85–1.16)
KETONES UR-MCNC: NEGATIVE MG/DL — SIGNIFICANT CHANGE UP
LEGIONELLA AG UR QL: NEGATIVE — SIGNIFICANT CHANGE UP
LEUKOCYTE ESTERASE UR-ACNC: NEGATIVE — SIGNIFICANT CHANGE UP
MAGNESIUM SERPL-MCNC: 2.6 MG/DL — SIGNIFICANT CHANGE UP (ref 1.6–2.6)
MCHC RBC-ENTMCNC: 31.8 PG — SIGNIFICANT CHANGE UP (ref 27–34)
MCHC RBC-ENTMCNC: 33.6 G/DL — SIGNIFICANT CHANGE UP (ref 32–36)
MCV RBC AUTO: 94.4 FL — SIGNIFICANT CHANGE UP (ref 80–100)
MRSA PCR RESULT.: SIGNIFICANT CHANGE UP
MRSA PCR RESULT.: SIGNIFICANT CHANGE UP
NITRITE UR-MCNC: NEGATIVE — SIGNIFICANT CHANGE UP
NRBC # BLD: 0 /100 WBCS — SIGNIFICANT CHANGE UP (ref 0–0)
NRBC BLD-RTO: 0 /100 WBCS — SIGNIFICANT CHANGE UP (ref 0–0)
PH UR: 6 — SIGNIFICANT CHANGE UP (ref 5–8)
PHOSPHATE SERPL-MCNC: 5.8 MG/DL — HIGH (ref 2.5–4.5)
PLATELET # BLD AUTO: 87 K/UL — LOW (ref 150–400)
POTASSIUM SERPL-MCNC: 5.1 MMOL/L — SIGNIFICANT CHANGE UP (ref 3.5–5.3)
POTASSIUM SERPL-SCNC: 5.1 MMOL/L — SIGNIFICANT CHANGE UP (ref 3.5–5.3)
PROT SERPL-MCNC: 5.6 G/DL — LOW (ref 6–8.3)
PROT UR-MCNC: 300 MG/DL
PROTHROM AB SERPL-ACNC: 11.5 SEC — SIGNIFICANT CHANGE UP (ref 9.9–13.4)
RBC # BLD: 3.37 M/UL — LOW (ref 4.2–5.8)
RBC # FLD: 12.9 % — SIGNIFICANT CHANGE UP (ref 10.3–14.5)
RBC CASTS # UR COMP ASSIST: 2 /HPF — SIGNIFICANT CHANGE UP (ref 0–4)
REVIEW: SIGNIFICANT CHANGE UP
S AUREUS DNA NOSE QL NAA+PROBE: SIGNIFICANT CHANGE UP
S AUREUS DNA NOSE QL NAA+PROBE: SIGNIFICANT CHANGE UP
S PNEUM AG UR QL: NEGATIVE — SIGNIFICANT CHANGE UP
SODIUM SERPL-SCNC: 133 MMOL/L — LOW (ref 135–145)
SP GR SPEC: 1.01 — SIGNIFICANT CHANGE UP (ref 1–1.03)
SPECIMEN SOURCE: SIGNIFICANT CHANGE UP
SQUAMOUS # UR AUTO: 1 /HPF — SIGNIFICANT CHANGE UP (ref 0–5)
TROPONIN T, HIGH SENSITIVITY RESULT: 67 NG/L — HIGH (ref 0–51)
UROBILINOGEN FLD QL: 0.2 MG/DL — SIGNIFICANT CHANGE UP (ref 0.2–1)
WBC # BLD: 9.51 K/UL — SIGNIFICANT CHANGE UP (ref 3.8–10.5)
WBC # FLD AUTO: 9.51 K/UL — SIGNIFICANT CHANGE UP (ref 3.8–10.5)
WBC UR QL: 0 /HPF — SIGNIFICANT CHANGE UP (ref 0–5)

## 2025-01-18 PROCEDURE — 95720 EEG PHY/QHP EA INCR W/VEEG: CPT

## 2025-01-18 PROCEDURE — 99291 CRITICAL CARE FIRST HOUR: CPT | Mod: FS

## 2025-01-18 PROCEDURE — 99291 CRITICAL CARE FIRST HOUR: CPT

## 2025-01-18 RX ORDER — SODIUM CHLORIDE 9 G/ML
1000 INJECTION, SOLUTION INTRAVENOUS
Refills: 0 | Status: DISCONTINUED | OUTPATIENT
Start: 2025-01-18 | End: 2025-01-19

## 2025-01-18 RX ORDER — CEFTRIAXONE 250 MG/1
1000 INJECTION, POWDER, FOR SOLUTION INTRAMUSCULAR; INTRAVENOUS EVERY 24 HOURS
Refills: 0 | Status: DISCONTINUED | OUTPATIENT
Start: 2025-01-18 | End: 2025-01-22

## 2025-01-18 RX ORDER — LURASIDONE HYDROCHLORIDE 80 MG/1
40 TABLET, FILM COATED ORAL DAILY
Refills: 0 | Status: DISCONTINUED | OUTPATIENT
Start: 2025-01-18 | End: 2025-01-18

## 2025-01-18 RX ORDER — LACOSAMIDE 200 MG/1
150 TABLET, FILM COATED ORAL
Refills: 0 | Status: DISCONTINUED | OUTPATIENT
Start: 2025-01-18 | End: 2025-01-19

## 2025-01-18 RX ADMIN — LAMOTRIGINE 100 MILLIGRAM(S): 100 TABLET ORAL at 05:32

## 2025-01-18 RX ADMIN — LAMOTRIGINE 100 MILLIGRAM(S): 100 TABLET ORAL at 17:28

## 2025-01-18 RX ADMIN — GABAPENTIN 300 MILLIGRAM(S): 800 TABLET ORAL at 05:31

## 2025-01-18 RX ADMIN — LACOSAMIDE 150 MILLIGRAM(S): 200 TABLET, FILM COATED ORAL at 05:31

## 2025-01-18 RX ADMIN — OSELTAMIVIR PHOSPHATE 30 MILLIGRAM(S): 75 CAPSULE ORAL at 05:32

## 2025-01-18 RX ADMIN — ATORVASTATIN CALCIUM 20 MILLIGRAM(S): 80 TABLET, FILM COATED ORAL at 22:02

## 2025-01-18 RX ADMIN — Medication 7500 UNIT(S): at 05:33

## 2025-01-18 RX ADMIN — SODIUM CHLORIDE 100 MILLILITER(S): 9 INJECTION, SOLUTION INTRAVENOUS at 22:02

## 2025-01-18 RX ADMIN — ANTISEPTIC SURGICAL SCRUB 15 MILLILITER(S): 0.04 SOLUTION TOPICAL at 05:31

## 2025-01-18 RX ADMIN — Medication 7500 UNIT(S): at 17:27

## 2025-01-18 RX ADMIN — DEXMEDETOMIDINE HYDROCHLORIDE 13 MICROGRAM(S)/KG/HR: 4 INJECTION, SOLUTION INTRAVENOUS at 22:02

## 2025-01-18 RX ADMIN — OSELTAMIVIR PHOSPHATE 30 MILLIGRAM(S): 75 CAPSULE ORAL at 17:27

## 2025-01-18 RX ADMIN — GABAPENTIN 300 MILLIGRAM(S): 800 TABLET ORAL at 17:27

## 2025-01-18 RX ADMIN — LEVETIRACETAM 400 MILLIGRAM(S): 750 TABLET, FILM COATED ORAL at 05:32

## 2025-01-18 RX ADMIN — ANTISEPTIC SURGICAL SCRUB 15 MILLILITER(S): 0.04 SOLUTION TOPICAL at 17:28

## 2025-01-18 RX ADMIN — LACOSAMIDE 150 MILLIGRAM(S): 200 TABLET, FILM COATED ORAL at 17:27

## 2025-01-18 RX ADMIN — ESCITALOPRAM 15 MILLIGRAM(S): 10 TABLET, FILM COATED ORAL at 12:57

## 2025-01-18 RX ADMIN — LEVETIRACETAM 400 MILLIGRAM(S): 750 TABLET, FILM COATED ORAL at 17:28

## 2025-01-18 RX ADMIN — ANTISEPTIC SURGICAL SCRUB 1 APPLICATION(S): 0.04 SOLUTION TOPICAL at 05:31

## 2025-01-18 RX ADMIN — CEFTRIAXONE 100 MILLIGRAM(S): 250 INJECTION, POWDER, FOR SOLUTION INTRAMUSCULAR; INTRAVENOUS at 22:03

## 2025-01-18 NOTE — PROGRESS NOTE ADULT - SUBJECTIVE AND OBJECTIVE BOX
CHIEF COMPLAINT: Mr. Tay Gr is a 69yoM w/ hx of metastatic testicular cancer (s/p L orchiectomy, on etoposide/cisplatin chemo, last dose 2024), epilepsy (grand-mal seizures), schizophrenia, developmental delay, HTN, HLD, VAZQUEZ, stage III CKD, severe obesity, secondary hyperparathyroidism, anemia, thrombocytopenia who presented to ED for fall secondary to seizure. Found to be flu+ and with elevated troponin as well.     Interval Events: Mild low grade temp 100.7 overnight. No other events.     REVIEW OF SYSTEMS: Intubated/sedated.     OBJECTIVE:  ICU Vital Signs Last 24 Hrs  T(C): 37.3 (2025 07:00), Max: 39.2 (2025 21:02)  T(F): 99.1 (2025 07:00), Max: 102.6 (2025 21:02)  HR: 56 (2025 08:15) (55 - 134)  BP: 94/51 (2025 08:15) (83/45 - 204/113)  BP(mean): 69 (2025 08:15) (59 - 104)  ABP: --  ABP(mean): --  RR: 16 (2025 08:15) (14 - 28)  SpO2: 96% (2025 08:15) (91% - 100%)    O2 Parameters below as of 2025 21:02  Patient On (Oxygen Delivery Method): ventilator    O2 Concentration (%): 70     @ 07:  -  18 @ 07:00  --------------------------------------------------------  IN: 1428.1 mL / OUT: 1230 mL / NET: 198.1 mL     @ 07:01  -  18 @ 08:31  --------------------------------------------------------  IN: 196.4 mL / OUT: 25 mL / NET: 171.4 mL    Mode: AC/ CMV (Assist Control/ Continuous Mandatory Ventilation), RR (machine): 16, TV (machine): 450, FiO2: 30, PEEP: 8, ITime: 1, MAP: 13, PIP: 29  Drips  CAPILLARY BLOOD GLUCOSE    POCT Blood Glucose.: 150 mg/dL (2025 18:57)    PHYSICAL EXAM:  ***      LABS:  ( @ 22:20)                        12.0  17.77 )-----------( 114                 36.2    Neutrophils = -- (--%)  Lymphocytes = -- (--%)  Eosinophils = -- (--%)  Basophils = -- (--%)  Monocytes = -- (--%)  Bands = --%    WBC Trend: 17.77<--, 13.82<--, 6.72<--  Hb Trend: 12.0<--, 14.4<--, 12.5<--, 11.6<--  Plt Trend: 114<--, 120<--, 104<--, 135<--      133[L]  |  102  |  33[H]  ----------------------------<  174[H]  5.0   |  16[L]  |  2.56[H]    Ca    8.5      2025 22:46  Phos  3.4       Mg     2.5         TPro  6.2  /  Alb  3.7  /  TBili  0.4  /  DBili  x   /  AST  66[H]  /  ALT  52[H]  /  AlkPhos  110      Creatinine Trend: 2.56<--, 2.55<--, 2.27<--, 2.79<--  PT/INR - ( 2025 22:20 )   PT: 11.4 sec;   INR: 0.99 ratio         PTT - ( 2025 22:20 )  PTT:23.9 sec  Urinalysis Basic - ( 2025 00:36 )    Color: Yellow / Appearance: Clear / S.011 / pH: x  Gluc: x / Ketone: Negative mg/dL  / Bili: Negative / Urobili: 0.2 mg/dL   Blood: x / Protein: 300 mg/dL / Nitrite: Negative   Leuk Esterase: Negative / RBC: 2 /HPF / WBC 0 /HPF   Sq Epi: x / Non Sq Epi: 1 /HPF / Bacteria: Negative /HPF      Arterial Blood Gas:   @ 03:15  7.28/39/151/18/97.8/-7.9  ABG lactate: --  Arterial Blood Gas:   @ 22:21  7.28/40/179/19/98.2/-7.5  ABG lactate: --  Arterial Blood Gas:   @ 19:14  7.15/34/91/12/96.1/-16.0  ABG lactate: --  Arterial Blood Gas:   @ 14:50  7.25/31/112/14/97.3/-12.4  ABG lactate: --    Venous Blood Gas:   @ 11:22  7.29/48/40/23/68.0  VBG Lactate: 1.1          MICROBIOLOGY:   Blood Cx:  Urine Cx:  Sputum Cx:  Legionella:  RVP:    HOSPITAL MEDICATIONS:  MEDICATIONS  (STANDING):  atorvastatin 20 milliGRAM(s) Oral at bedtime  cefTRIAXone   IVPB 1000 milliGRAM(s) IV Intermittent every 24 hours  chlorhexidine 0.12% Liquid 15 milliLiter(s) Oral Mucosa every 12 hours  chlorhexidine 2% Cloths 1 Application(s) Topical <User Schedule>  dexMEDEtomidine Infusion 0.5 MICROgram(s)/kG/Hr (13 mL/Hr) IV Continuous <Continuous>  escitalopram 15 milliGRAM(s) Oral daily  gabapentin Solution 300 milliGRAM(s) Oral two times a day  heparin   Injectable 7500 Unit(s) SubCutaneous every 12 hours  influenza  Vaccine (HIGH DOSE) 0.5 milliLiter(s) IntraMuscular once  lacosamide Solution 150 milliGRAM(s) Oral two times a day  lactated ringers. 1000 milliLiter(s) (75 mL/Hr) IV Continuous <Continuous>  lamoTRIgine 100 milliGRAM(s) Oral two times a day  levETIRAcetam  IVPB 1500 milliGRAM(s) IV Intermittent two times a day  norepinephrine Infusion 0.05 MICROgram(s)/kG/Min (9.78 mL/Hr) IV Continuous <Continuous>  oseltamivir 30 milliGRAM(s) Oral two times a day  propofol Infusion 20 MICROgram(s)/kG/Min (12.5 mL/Hr) IV Continuous <Continuous>    MEDICATIONS  (PRN):      RADIOLOGY:  X Ray:  CT:  MRI:  Ultrasound:  [ ] Reviewed and interpreted by me    EKG:   CHIEF COMPLAINT: Mr. Donaldson is a 69M w/ hx of metastatic testicular cancer (s/p L orchiectomy, on etoposide/cisplatin chemo, last dose 2024), epilepsy (grand-mal seizures), schizophrenia, developmental delay, HTN, HLD, VAZQUEZ, stage III CKD, severe obesity, secondary hyperparathyroidism, anemia, thrombocytopenia who was admitted on 25 for fall and syncope of unknown cause. On , RRT called for seizure acitvity in MRI that lasted for about 1 minute that was thought to be related to sepsis given Tmax 103F. He was given labetalol for hypertensive emergency (BP 200s) and fever-reducing medications as well as duoneb given bronchoconstriction. Shortly after, pt had another episode of jerking that also resolved with neuro recs for CT head and vancomycin as not received. After CT scan, patient had another grand mal seizure and recieved ativan 2mg and vimpat load with seizure breaking. He was intubated for airway protection and admitted to MICU on propofol and norepi for further management of seizures in setting of illness.     Interval Events: Mild low grade temp 100.7 overnight. No other events.     REVIEW OF SYSTEMS: Intubated/sedated.     OBJECTIVE:  ICU Vital Signs Last 24 Hrs  T(C): 37.3 (2025 07:00), Max: 39.2 (2025 21:02)  T(F): 99.1 (2025 07:00), Max: 102.6 (2025 21:02)  HR: 56 (2025 08:15) (55 - 134)  BP: 94/51 (2025 08:15) (83/45 - 204/113)  BP(mean): 69 (2025 08:15) (59 - 104)  ABP: --  ABP(mean): --  RR: 16 (2025 08:15) (14 - 28)  SpO2: 96% (2025 08:15) (91% - 100%)    O2 Parameters below as of 2025 21:02  Patient On (Oxygen Delivery Method): ventilator    O2 Concentration (%): 70    - @ 07:01  -  -18 @ 07:00  --------------------------------------------------------  IN: 1428.1 mL / OUT: 1230 mL / NET: 198.1 mL     @ 07:01  -   @ 08:31  --------------------------------------------------------  IN: 196.4 mL / OUT: 25 mL / NET: 171.4 mL    Mode: AC/ CMV (Assist Control/ Continuous Mandatory Ventilation), RR (machine): 16, TV (machine): 450, FiO2: 30, PEEP: 8, ITime: 1, MAP: 13, PIP: 29  Drips  CAPILLARY BLOOD GLUCOSE    POCT Blood Glucose.: 150 mg/dL (2025 18:57)    PHYSICAL EXAM:  ***      LABS:  ( @ 22:20)                        12.0  17.77 )-----------( 114                 36.2    Neutrophils = -- (--%)  Lymphocytes = -- (--%)  Eosinophils = -- (--%)  Basophils = -- (--%)  Monocytes = -- (--%)  Bands = --%    WBC Trend: 17.77<--, 13.82<--, 6.72<--  Hb Trend: 12.0<--, 14.4<--, 12.5<--, 11.6<--  Plt Trend: 114<--, 120<--, 104<--, 135<--      133[L]  |  102  |  33[H]  ----------------------------<  174[H]  5.0   |  16[L]  |  2.56[H]    Ca    8.5      2025 22:46  Phos  3.4       Mg     2.5         TPro  6.2  /  Alb  3.7  /  TBili  0.4  /  DBili  x   /  AST  66[H]  /  ALT  52[H]  /  AlkPhos  110      Creatinine Trend: 2.56<--, 2.55<--, 2.27<--, 2.79<--  PT/INR - ( 2025 22:20 )   PT: 11.4 sec;   INR: 0.99 ratio         PTT - ( 2025 22:20 )  PTT:23.9 sec  Urinalysis Basic - ( 2025 00:36 )    Color: Yellow / Appearance: Clear / S.011 / pH: x  Gluc: x / Ketone: Negative mg/dL  / Bili: Negative / Urobili: 0.2 mg/dL   Blood: x / Protein: 300 mg/dL / Nitrite: Negative   Leuk Esterase: Negative / RBC: 2 /HPF / WBC 0 /HPF   Sq Epi: x / Non Sq Epi: 1 /HPF / Bacteria: Negative /HPF      Arterial Blood Gas:   @ 03:15  7.28/39/151/18/97.8/-7.9  ABG lactate: --  Arterial Blood Gas:   @ 22:21  7.28/40/179/19/98.2/-7.5  ABG lactate: --  Arterial Blood Gas:   @ 19:14  7.15/34/91/12/96.1/-16.0  ABG lactate: --  Arterial Blood Gas:   @ 14:50  7.25/31/112/14/97.3/-12.4  ABG lactate: --    Venous Blood Gas:   @ 11:22  7.29/48/40/23/68.0  VBG Lactate: 1.1          MICROBIOLOGY:   Blood Cx:  Urine Cx:  Sputum Cx:  Legionella:  RVP:    HOSPITAL MEDICATIONS:  MEDICATIONS  (STANDING):  atorvastatin 20 milliGRAM(s) Oral at bedtime  cefTRIAXone   IVPB 1000 milliGRAM(s) IV Intermittent every 24 hours  chlorhexidine 0.12% Liquid 15 milliLiter(s) Oral Mucosa every 12 hours  chlorhexidine 2% Cloths 1 Application(s) Topical <User Schedule>  dexMEDEtomidine Infusion 0.5 MICROgram(s)/kG/Hr (13 mL/Hr) IV Continuous <Continuous>  escitalopram 15 milliGRAM(s) Oral daily  gabapentin Solution 300 milliGRAM(s) Oral two times a day  heparin   Injectable 7500 Unit(s) SubCutaneous every 12 hours  influenza  Vaccine (HIGH DOSE) 0.5 milliLiter(s) IntraMuscular once  lacosamide Solution 150 milliGRAM(s) Oral two times a day  lactated ringers. 1000 milliLiter(s) (75 mL/Hr) IV Continuous <Continuous>  lamoTRIgine 100 milliGRAM(s) Oral two times a day  levETIRAcetam  IVPB 1500 milliGRAM(s) IV Intermittent two times a day  norepinephrine Infusion 0.05 MICROgram(s)/kG/Min (9.78 mL/Hr) IV Continuous <Continuous>  oseltamivir 30 milliGRAM(s) Oral two times a day  propofol Infusion 20 MICROgram(s)/kG/Min (12.5 mL/Hr) IV Continuous <Continuous>    MEDICATIONS  (PRN):      RADIOLOGY:  X Ray:  CT:  MRI:  Ultrasound:  [ ] Reviewed and interpreted by me    EKG:   CHIEF COMPLAINT: Mr. Donaldson is a 69M w/ hx of metastatic testicular cancer (s/p L orchiectomy, on etoposide/cisplatin chemo, last dose 2024), epilepsy (grand-mal seizures), schizophrenia, developmental delay, HTN, HLD, VAZQUEZ, stage III CKD, severe obesity, secondary hyperparathyroidism, anemia, thrombocytopenia who was admitted on 25 for fall and syncope of unknown cause. On , RRT called for seizure acitvity in MRI that lasted for about 1 minute that was thought to be related to sepsis given Tmax 103F. He was given labetalol for hypertensive emergency (BP 200s) and fever-reducing medications as well as duoneb given bronchoconstriction. Shortly after, pt had another episode of jerking that also resolved with neuro recs for CT head and vancomycin as not received. After CT scan, patient had another grand mal seizure and recieved ativan 2mg and vimpat load with seizure breaking. He was intubated for airway protection and admitted to MICU on propofol and norepi for further management of seizures in setting of illness.     Interval Events: Mild low grade temp 100.7 overnight. No other events.     REVIEW OF SYSTEMS: Intubated/sedated.     OBJECTIVE:  ICU Vital Signs Last 24 Hrs  T(C): 37.3 (2025 07:00), Max: 39.2 (2025 21:02)  T(F): 99.1 (2025 07:00), Max: 102.6 (2025 21:02)  HR: 56 (2025 08:15) (55 - 134)  BP: 94/51 (2025 08:15) (83/45 - 204/113)  BP(mean): 69 (2025 08:15) (59 - 104)  ABP: --  ABP(mean): --  RR: 16 (2025 08:15) (14 - 28)  SpO2: 96% (2025 08:15) (91% - 100%)    O2 Parameters below as of 2025 21:02  Patient On (Oxygen Delivery Method): ventilator    O2 Concentration (%): 70    - @ 07:01  -  -18 @ 07:00  --------------------------------------------------------  IN: 1428.1 mL / OUT: 1230 mL / NET: 198.1 mL     @ 07:01  -   @ 08:31  --------------------------------------------------------  IN: 196.4 mL / OUT: 25 mL / NET: 171.4 mL    Mode: AC/ CMV (Assist Control/ Continuous Mandatory Ventilation), RR (machine): 16, TV (machine): 450, FiO2: 30, PEEP: 8, ITime: 1, MAP: 13, PIP: 29  Drips  CAPILLARY BLOOD GLUCOSE    POCT Blood Glucose.: 150 mg/dL (2025 18:57)    ***    LABS:  ( @ 22:20)                        12.0  17.77 )-----------( 114                 36.2    Neutrophils = -- (--%)  Lymphocytes = -- (--%)  Eosinophils = -- (--%)  Basophils = -- (--%)  Monocytes = -- (--%)  Bands = --%    WBC Trend: 17.77<--, 13.82<--, 6.72<--  Hb Trend: 12.0<--, 14.4<--, 12.5<--, 11.6<--  Plt Trend: 114<--, 120<--, 104<--, 135<--      133[L]  |  102  |  33[H]  ----------------------------<  174[H]  5.0   |  16[L]  |  2.56[H]    Ca    8.5      2025 22:46  Phos  3.4       Mg     2.5         TPro  6.2  /  Alb  3.7  /  TBili  0.4  /  DBili  x   /  AST  66[H]  /  ALT  52[H]  /  AlkPhos  110      Creatinine Trend: 2.56<--, 2.55<--, 2.27<--, 2.79<--  PT/INR - ( 2025 22:20 )   PT: 11.4 sec;   INR: 0.99 ratio         PTT - ( 2025 22:20 )  PTT:23.9 sec  Urinalysis Basic - ( 2025 00:36 )    Color: Yellow / Appearance: Clear / S.011 / pH: x  Gluc: x / Ketone: Negative mg/dL  / Bili: Negative / Urobili: 0.2 mg/dL   Blood: x / Protein: 300 mg/dL / Nitrite: Negative   Leuk Esterase: Negative / RBC: 2 /HPF / WBC 0 /HPF   Sq Epi: x / Non Sq Epi: 1 /HPF / Bacteria: Negative /HPF      Arterial Blood Gas:   @ 03:15  7.28/39/151/18/97.8/-7.9  ABG lactate: --  Arterial Blood Gas:   @ 22:21  7.28/40/179/19/98.2/-7.5  ABG lactate: --  Arterial Blood Gas:   @ 19:14  7.15/34/91/12/96.1/-16.0  ABG lactate: --  Arterial Blood Gas:   @ 14:50  7.25/31/112/14/97.3/-12.4  ABG lactate: --    Venous Blood Gas:   @ 11:22  7.29/48/40/23/68.0  VBG Lactate: 1.1          MICROBIOLOGY:   Blood Cx:  Urine Cx:  Sputum Cx:  Legionella:  RVP:    HOSPITAL MEDICATIONS:  MEDICATIONS  (STANDING):  atorvastatin 20 milliGRAM(s) Oral at bedtime  cefTRIAXone   IVPB 1000 milliGRAM(s) IV Intermittent every 24 hours  chlorhexidine 0.12% Liquid 15 milliLiter(s) Oral Mucosa every 12 hours  chlorhexidine 2% Cloths 1 Application(s) Topical <User Schedule>  dexMEDEtomidine Infusion 0.5 MICROgram(s)/kG/Hr (13 mL/Hr) IV Continuous <Continuous>  escitalopram 15 milliGRAM(s) Oral daily  gabapentin Solution 300 milliGRAM(s) Oral two times a day  heparin   Injectable 7500 Unit(s) SubCutaneous every 12 hours  influenza  Vaccine (HIGH DOSE) 0.5 milliLiter(s) IntraMuscular once  lacosamide Solution 150 milliGRAM(s) Oral two times a day  lactated ringers. 1000 milliLiter(s) (75 mL/Hr) IV Continuous <Continuous>  lamoTRIgine 100 milliGRAM(s) Oral two times a day  levETIRAcetam  IVPB 1500 milliGRAM(s) IV Intermittent two times a day  norepinephrine Infusion 0.05 MICROgram(s)/kG/Min (9.78 mL/Hr) IV Continuous <Continuous>  oseltamivir 30 milliGRAM(s) Oral two times a day  propofol Infusion 20 MICROgram(s)/kG/Min (12.5 mL/Hr) IV Continuous <Continuous>    MEDICATIONS  (PRN):      RADIOLOGY:  X Ray:  CT:  MRI:  Ultrasound:  [ ] Reviewed and interpreted by me    EKG:   CHIEF COMPLAINT: Mr. Donaldson is a 69M w/ hx of metastatic testicular cancer (s/p L orchiectomy, on etoposide/cisplatin chemo, last dose 2024), epilepsy (grand-mal seizures), schizophrenia, developmental delay, HTN, HLD, VAZQUEZ, stage III CKD, severe obesity, secondary hyperparathyroidism, anemia, thrombocytopenia who was admitted on 25 for fall and syncope of unknown cause. On , RRT called for seizure acitvity in MRI that lasted for about 1 minute that was thought to be related to sepsis given Tmax 103F. He was given labetalol for hypertensive emergency (BP 200s) and fever-reducing medications as well as duoneb given bronchoconstriction. Shortly after, pt had another episode of jerking that also resolved with neuro recs for CT head and vancomycin as not received. After CT scan, patient had another grand mal seizure and recieved ativan 2mg and vimpat load with seizure breaking. He was intubated for airway protection and admitted to MICU on propofol and norepi for further management of seizures in setting of illness.     Interval Events: Mild low grade temp 100.7 overnight. No other events.     REVIEW OF SYSTEMS: Intubated/sedated.     OBJECTIVE:  ICU Vital Signs Last 24 Hrs  T(C): 37.3 (2025 07:00), Max: 39.2 (2025 21:02)  T(F): 99.1 (2025 07:00), Max: 102.6 (2025 21:02)  HR: 56 (2025 08:15) (55 - 134)  BP: 94/51 (2025 08:15) (83/45 - 204/113)  BP(mean): 69 (2025 08:15) (59 - 104)  ABP: --  ABP(mean): --  RR: 16 (2025 08:15) (14 - 28)  SpO2: 96% (2025 08:15) (91% - 100%)    O2 Parameters below as of 2025 21:02  Patient On (Oxygen Delivery Method): ventilator    O2 Concentration (%): 70    - @ 07:01  -  -18 @ 07:00  --------------------------------------------------------  IN: 1428.1 mL / OUT: 1230 mL / NET: 198.1 mL     @ 07:01  -   @ 08:31  --------------------------------------------------------  IN: 196.4 mL / OUT: 25 mL / NET: 171.4 mL    Mode: AC/ CMV (Assist Control/ Continuous Mandatory Ventilation), RR (machine): 16, TV (machine): 450, FiO2: 30, PEEP: 8, ITime: 1, MAP: 13, PIP: 29  Drips  CAPILLARY BLOOD GLUCOSE    POCT Blood Glucose.: 150 mg/dL (2025 18:57)    PHYSICAL EXAM  General:   HEENT: Intubated, sedated, no distress, vEEG ongoing, R forehead ecchymosis  Heart: Regular rate, rhythm. Normal S1/S2. No murmurs.  Lungs: Expiratory wheeze prolonged bilaterally, distant sounds through chest wall  GI: Soft, obese, non-tender.   Neuro: Intubated, sedated, vEEG ongoing  Ext: Peripheral pulses intact. No lower extremity edema. R foot warmer with stronger DP than L foot   Skin: Warm, well-perfused     BP Checked 25  LA: 110/63 | RA: 95/57  LL: 133/66 | RL: 144/73    BP Checked 25  LA: 140s | RA: 200s, persistent on multiple checks that evening    LABS:  ( @ 22:20)                        12.0  17.77 )-----------( 114                 36.2    Neutrophils = -- (--%)  Lymphocytes = -- (--%)  Eosinophils = -- (--%)  Basophils = -- (--%)  Monocytes = -- (--%)  Bands = --%    WBC Trend: 17.77<--, 13.82<--, 6.72<--  Hb Trend: 12.0<--, 14.4<--, 12.5<--, 11.6<--  Plt Trend: 114<--, 120<--, 104<--, 135<--      133[L]  |  102  |  33[H]  ----------------------------<  174[H]  5.0   |  16[L]  |  2.56[H]    Ca    8.5      2025 22:46  Phos  3.4       Mg     2.5         TPro  6.2  /  Alb  3.7  /  TBili  0.4  /  DBili  x   /  AST  66[H]  /  ALT  52[H]  /  AlkPhos  110      Creatinine Trend: 2.56<--, 2.55<--, 2.27<--, 2.79<--  PT/INR - ( 2025 22:20 )   PT: 11.4 sec;   INR: 0.99 ratio         PTT - ( 2025 22:20 )  PTT:23.9 sec  Urinalysis Basic - ( 2025 00:36 )    Color: Yellow / Appearance: Clear / S.011 / pH: x  Gluc: x / Ketone: Negative mg/dL  / Bili: Negative / Urobili: 0.2 mg/dL   Blood: x / Protein: 300 mg/dL / Nitrite: Negative   Leuk Esterase: Negative / RBC: 2 /HPF / WBC 0 /HPF   Sq Epi: x / Non Sq Epi: 1 /HPF / Bacteria: Negative /HPF    Arterial Blood Gas:   @ 03:15  7.28/39/151/18/97.8/-7.9  ABG lactate: --  Arterial Blood Gas:   @ 22:21  7.28/40/179/19/98.2/-7.5  ABG lactate: --  Arterial Blood Gas:   @ 19:14  7.15/34/91/12/96.1/-16.0  ABG lactate: --  Arterial Blood Gas:   @ 14:50  7.25/31/112/14/97.3/-12.4  ABG lactate: --    Venous Blood Gas:   @ 11:22  7.29/48/40/23/68.0  VBG Lactate: 1.1    MICROBIOLOGY:   Blood Cx:  Urine Cx:  Sputum Cx:  Legionella:  RVP:    HOSPITAL MEDICATIONS:  MEDICATIONS  (STANDING):  atorvastatin 20 milliGRAM(s) Oral at bedtime  cefTRIAXone   IVPB 1000 milliGRAM(s) IV Intermittent every 24 hours  chlorhexidine 0.12% Liquid 15 milliLiter(s) Oral Mucosa every 12 hours  chlorhexidine 2% Cloths 1 Application(s) Topical <User Schedule>  dexMEDEtomidine Infusion 0.5 MICROgram(s)/kG/Hr (13 mL/Hr) IV Continuous <Continuous>  escitalopram 15 milliGRAM(s) Oral daily  gabapentin Solution 300 milliGRAM(s) Oral two times a day  heparin   Injectable 7500 Unit(s) SubCutaneous every 12 hours  influenza  Vaccine (HIGH DOSE) 0.5 milliLiter(s) IntraMuscular once  lacosamide Solution 150 milliGRAM(s) Oral two times a day  lactated ringers. 1000 milliLiter(s) (75 mL/Hr) IV Continuous <Continuous>  lamoTRIgine 100 milliGRAM(s) Oral two times a day  levETIRAcetam  IVPB 1500 milliGRAM(s) IV Intermittent two times a day  norepinephrine Infusion 0.05 MICROgram(s)/kG/Min (9.78 mL/Hr) IV Continuous <Continuous>  oseltamivir 30 milliGRAM(s) Oral two times a day  propofol Infusion 20 MICROgram(s)/kG/Min (12.5 mL/Hr) IV Continuous <Continuous>    MEDICATIONS  (PRN):    RADIOLOGY:  X Ray:  CT:  MRI:  Ultrasound:  [ ] Reviewed and interpreted by me    EKG:

## 2025-01-18 NOTE — CHART NOTE - NSCHARTNOTEFT_GEN_A_CORE
Discussion had with primary care team provider in MICU.  Patient currently being set up on EEG.  Patient receiving antiseizure meds through OG tube and IV combo.  Recommend attempting to wean sedation from propofol in the morning while patient on VEEG.    Neurology  [id number]

## 2025-01-18 NOTE — PROGRESS NOTE ADULT - ASSESSMENT
69 year old male with metastatic testicular cancer (s/p L orchiectomy, on etoposide/cisplatin chemo, last dose 6/2024), epilepsy (grand-mal seizures), schizophrenia, developmental delay, HTN, HLD, VAZQUEZ, stage III CKD, severe obesity, secondary hyperparathyroidism, anemia, thrombocytopenia who presented to ED for fall, possible seizure prior.     Admitted for further work up for syncope, c/f seizure   Influenza A  - tested FluA positive on admission  - afebrile, WBC wnl, saturating well on RA  - reports cough for few weeks, no other resp sx  - CXR with clear lungs, no pneumonia   noted RRTs, abn labs  - Ceftriaxone 1g daily (1/17-1/22)  - Tamiflu 30mg BID (1/18-1/22)    continue for now    Thank you for consulting us and involving us in the management of this most interesting and challenging case.  In addition to reviewing history, imaging, documents, labs, microbiology, took into account antibiotic stewardship, local antibiogram and infection control strategies and potential transmission issues.    We will follow along in the care of this patient. Please contact me by texting me directly on my cell# at [phone] using TEAMS or call our answering service at [phone] with any concerns.    Starting Monday Dr. Greyson Mart will be covering for our group. If you have any questions, concerns or new micro data please reach out to them using TEAMS *PREFERRED* or by calling our service at [phone]

## 2025-01-18 NOTE — PROGRESS NOTE ADULT - ASSESSMENT
ASSESSMENT & PLAN  69M w/ hx of metastatic testicular cancer (s/p L orchiectomy, on etoposide/cisplatin chemo, last dose 6/2024), epilepsy (grand-mal seizures), schizophrenia, developmental delay, HTN, HLD, VAZQUEZ, stage III CKD, severe obesity, secondary hyperparathyroidism, anemia, thrombocytopenia who presented to ED for fall, possible seizure prior. S/p 2 RRT for grand mal seizures, intubated for airway protection, admitted to MICU for further management.    ******NEUROLOGY************  #Status epilepticus  #Aspiration precautions  #Seizure precautions  - Pt s/p 2 RRTs for seizure activity, grand mal with b/l nystagmus and upper extremity jerking; 2 seizure episodes lasting 1-2 mins each with one minute of post ictal confusion in btwn  - Pt did not have tongue injuries or oral/mucosal lacerations on brief oral exam  - Head and neck exam with minor trauma to R forehead, CT scan neg for bleed  - Now intubated for status epilepticus  - Sedation with IV propofol and precedex, IV ativan PRN  - IV versed  - Discussed with neuroepilepsy, s/p 200mg vimpat load  - Pending EEG  - Continue antiepileptic drugs: keppra 1.5g bid, lamotrigine 100mg bid, gabapentin 300mg bid, vimpat 150 bid    #Schizophrenia  - Hold home meds while sedated    ******CARDIOVASCULAR************  #HTN  #HLD  - Hemodynamically stable, no pressor requirements currently  - Recent TTE obtained showing normal LV fxn i/s/o testicular cancer chemo treatment    ******PULMONARY************  #Airway precautions  - No aspiration noted during intubation, no tongue-bite, brief oral exam neg for any injury/trauma  - Intubated for airway protection and aspiration precuations during status epilepticus  - Wean down FIO2 to minimum  - CXR to ensure ET and OG tube positioning  - Titrate vent settings based on SpO2 and blood gas  - Head elevation to 15-30 degrees    #VAZQUEZ    ******INFECTIOUS DISEASE************  #Aspiration PNA  #Flu A positive  - Pt now post-intubation  - Will cover for possible aspiration PNA with renally dosed Zosyn x7 days (1/17- )  - Tamiflu 30mg bid x5 days for Flu positive  - F/u bronch culture    ******GASTROINTESTINAL************  #Diet: NPO except meds  - OG tube placed, however not feeding until seizure ruled out due to risk of aspiration  - Can use OG tube for meds    ******RENAL************  #Metabolic acidosis  #Lactic acidosis  #CKD Stage III  - HCO3 12, pH 7.15, lactate 9.1 as of 1/17 PM  - Will repeat blood gas and BMP q4h    #At risk of rhabdomyolysis  - Will monitor CK    ******ENDOCRINE************  #At risk of hypoglycemia  - Pt NPO except meds due to c/f aspiration i/s/o seizure  - FS q6h while NPO  - d5 NS or d5 LR, 50-60 cc/h to prevent hypoglycemia  - F/u TFTs    #Hx of secondary hyperparathyroidism    ******HEME/ONC************  #DVT ppx  - subQ heparin    #Metastatic testicular cancer  - On chemotherapy, most recent dose 6/2024    [facility]: Full Code  ASSESSMENT & PLAN  69M w/ hx of metastatic testicular cancer (s/p L orchiectomy, on etoposide/cisplatin chemo, last dose 6/2024), epilepsy (grand-mal seizures), schizophrenia, developmental delay, HTN, HLD, VAZQUEZ, stage III CKD, severe obesity, secondary hyperparathyroidism, anemia, thrombocytopenia who presented to ED for fall, possible seizure prior. S/p 2 RRT for grand mal seizures, intubated for airway protection, admitted to MICU for further management.    CHANGES TODAY  ***to complete    ******NEUROLOGY************  #Status epilepticus  #Aspiration precautions  #Seizure precautions  - Pt s/p 2 RRTs for seizure activity, grand mal with b/l nystagmus and upper extremity jerking; 2 seizure episodes lasting 1-2 mins each with one minute of post ictal confusion in btwn  - Pt did not have tongue injuries or oral/mucosal lacerations on brief oral exam  - Head and neck exam with minor trauma to R forehead, CT scan neg for bleed  - Now intubated for status epilepticus  - Sedation with IV propofol and precedex, IV ativan PRN  - IV versed  - Discussed with neuroepilepsy, s/p 200mg vimpat load  - Pending EEG  - Continue antiepileptic drugs: keppra 1.5g bid, lamotrigine 100mg bid, gabapentin 300mg bid, vimpat 150 bid    #Schizophrenia  - Hold home meds while sedated    ******CARDIOVASCULAR************  #HTN  #HLD  - Hemodynamically stable, no pressor requirements currently  - Recent TTE obtained showing normal LV fxn i/s/o testicular cancer chemo treatment    ******PULMONARY************  #Airway precautions  - No aspiration noted during intubation, no tongue-bite, brief oral exam neg for any injury/trauma  - Intubated for airway protection and aspiration precuations during status epilepticus  - Wean down FIO2 to minimum  - CXR to ensure ET and OG tube positioning  - Titrate vent settings based on SpO2 and blood gas  - Head elevation to 15-30 degrees    #VAZQUEZ    ******INFECTIOUS DISEASE************  #Aspiration PNA  #Flu A positive  - Pt now post-intubation  - Will cover for possible aspiration PNA with renally dosed Zosyn x7 days (1/17- )  - Tamiflu 30mg bid x5 days for Flu positive  - F/u bronch culture    ******GASTROINTESTINAL************  #Diet: NPO except meds  - OG tube placed, however not feeding until seizure ruled out due to risk of aspiration  - Can use OG tube for meds    ******RENAL************  #Metabolic acidosis  #Lactic acidosis  #CKD Stage III  - HCO3 12, pH 7.15, lactate 9.1 as of 1/17 PM  - Will repeat blood gas and BMP q4h    #At risk of rhabdomyolysis  - Will monitor CK    ******ENDOCRINE************  #At risk of hypoglycemia  - Pt NPO except meds due to c/f aspiration i/s/o seizure  - FS q6h while NPO  - d5 NS or d5 LR, 50-60 cc/h to prevent hypoglycemia  - F/u TFTs    #Hx of secondary hyperparathyroidism    ******HEME/ONC************  #DVT ppx  - subQ heparin    #Metastatic testicular cancer  - On chemotherapy, most recent dose 6/2024    [facility]: Full Code  ASSESSMENT & PLAN  69M w/ hx of metastatic testicular cancer (s/p L orchiectomy, on etoposide/cisplatin chemo, last dose 6/2024), epilepsy (grand-mal seizures), schizophrenia, developmental delay, HTN, HLD, VAZQUEZ, stage III CKD, severe obesity, secondary hyperparathyroidism, anemia, thrombocytopenia who presented to ED for fall, possible seizure prior. S/p 2 RRT for grand mal seizures, intubated for airway protection given status epilepticus and admitted to MICU for cares.     CHANGES TODAY  - Stopping propofol, prefer precedex  - Changed RR to 18  - Start tube feeds: nepro ok to start slowly  - Increased LR to 100cc/hr  - ***NEURO RECS***    ******NEUROLOGY************  #Status epilepticus  #Aspiration precautions  #Seizure precautions  Pt s/p 2 RRTs for seizure activity, grand mal with b/l nystagmus and upper extremity jerking; 2 seizure episodes lasting 1-2 mins each with one minute of post ictal confusion in btw. Pt did not have tongue injuries or oral/mucosal lacerations on brief oral exam. Head and neck exam with minor trauma to R forehead, CT scan neg for bleed. Now intubated for status epilepticus. Was loaded with Vimpat 200mg.   - Sedation: Precedex, Ativan  - Antiepileptics: Keppra 1.5g BID, Lamotrigine 100mg BID, Gabapentin 300mg BID, Vimpat 150mg BID  - EEG pending  - Neuro following, appreciate recs    #Schizophrenia  - Holding: Lurasidone for now, awaiting neuro recs for restart    ******CARDIOVASCULAR************  #HTN  #HLD  - Hemodynamically stable, no pressor requirements currently  - Recent TTE obtained showing normal LV fxn i/s/o testicular cancer chemo treatment    ******PULMONARY************  #Airway precautions  - No aspiration noted during intubation, no tongue-bite, brief oral exam neg for any injury/trauma  - Intubated for airway protection and aspiration precautions during status epilepticus  - Wean down FIO2 to minimum  - CXR to ensure ET and OG tube positioning  - Titrate vent settings based on SpO2 and blood gas  - Head elevation to 15-30 degrees    #VAZQUEZ    ******INFECTIOUS DISEASE************  #Aspiration PNA  #Flu A positive  - Pt now post-intubation  - Will cover for possible aspiration PNA with renally dosed Zosyn x7 days (1/17- )  - Tamiflu 30mg bid x5 days for Flu positive  - F/u bronch culture    ******GASTROINTESTINAL************  #Diet: NPO except meds  - Start Tube feeds  - OG tube placed    ******RENAL************  #Metabolic acidosis  #Lactic acidosis  #CKD Stage III  - HCO3 12, pH 7.15, lactate 9.1 as of 1/17 PM  - Will repeat blood gas and BMP q4h  - LR at 100cc/hr    #At risk of rhabdomyolysis  - Will monitor CK    ******ENDOCRINE************  #At risk of hypoglycemia  - Pt NPO except meds due to c/f aspiration i/s/o seizure  - FS q6h while NPO  - d5 NS or d5 LR, 50-60 cc/h to prevent hypoglycemia  - F/u TFTs    #Hx of secondary hyperparathyroidism    ******HEME/ONC************  #DVT ppx  - subQ heparin    #Metastatic testicular cancer  - On chemotherapy, most recent dose 6/2024    [facility]: Full Code  ASSESSMENT & PLAN  69M w/ hx of metastatic testicular cancer (s/p L orchiectomy, on etoposide/cisplatin chemo, last dose 6/2024), epilepsy (grand-mal seizures), schizophrenia, developmental delay, HTN, HLD, VAZQUEZ, stage III CKD, obesity, secondary hyperparathyroidism, anemia, thrombocytopenia who was admitted on 1/16/25 for fall and syncope. On 1/17 patient had multiple RRTs called for grand mal seizure activity and was initially recommended sepsis workup and antipyretics given T103F, but patient had x2 more episode and was ultimately given ativan 2mg, vimpat load 200mg, and intubated for airway protection with MICU transfer.     CHANGES TODAY  - Stopping propofol, prefer precedex  - Changed RR to 18  - Start tube feeds: nepro ok to start slowly  - Increased LR to 100cc/hr  - Per neuro: continue all AEDs and can start latuda    ===NEURO & PSYCH===  #Status Epilepticus  #Grand Mal Seizures  - Sedation: Precedex, Ativan  - AEDs: Keppra 1.5g BID, Lamotrigine 100mg BID, Gabapentin 300mg BID, Vimpat 150mg BID  - EEG pending  - Neuro following, appreciate recs    #Schizophrenia  - Lurasidone 40mg daily, ok per neuro    ===CVS===  #HTN #HLD  #HLD  #Hypotension  - Norepi wean as able, MAP>65    ******PULMONARY************  #Airway precautions  - Wean down FIO2 to minimum  - Titrate vent settings based on SpO2 and blood gas  - Head elevation to 15-30 degrees    #VAZQUEZ    ******INFECTIOUS DISEASE************  #Aspiration PNA  #Flu A positive  - Pt now post-intubation  - Will cover for possible aspiration PNA with renally dosed Zosyn x7 days (1/17- )  - Tamiflu 30mg bid x5 days for Flu positive  - F/u bronch culture    ******GASTROINTESTINAL************  #Diet: NPO except meds  - Start Tube feeds  - OG tube placed    ******RENAL************  #Metabolic acidosis  #Lactic acidosis  #CKD Stage III  - HCO3 12, pH 7.15, lactate 9.1 as of 1/17 PM  - Will repeat blood gas and BMP q4h  - LR at 100cc/hr    #At risk of rhabdomyolysis  - Will monitor CK    ******ENDOCRINE************  #At risk of hypoglycemia  - Pt NPO except meds due to c/f aspiration i/s/o seizure  - FS q6h while NPO  - d5 NS or d5 LR, 50-60 cc/h to prevent hypoglycemia  - F/u TFTs    #Hx of secondary hyperparathyroidism    ******HEME/ONC************  #DVT ppx  - subQ heparin    #Metastatic testicular cancer  - On chemotherapy, most recent dose 6/2024    [facility]: Full Code  ASSESSMENT & PLAN  69M w/ hx of metastatic testicular cancer (s/p L orchiectomy, on etoposide/cisplatin chemo, last dose 6/2024), epilepsy (grand-mal seizures), schizophrenia, developmental delay, HTN, HLD, VAZQUEZ, stage III CKD, obesity, secondary hyperparathyroidism, anemia, thrombocytopenia who was admitted on 1/16/25 for fall and syncope. On 1/17 patient had multiple RRTs called for grand mal seizure activity and was initially recommended sepsis workup and antipyretics given T103F, but patient had x2 more episode and was ultimately given ativan 2mg, vimpat load 200mg, and intubated for airway protection with MICU transfer.     CHANGES TODAY  - Stopping propofol, prefer precedex for taper to assess mental status\  - vEEG for spontaneous awake trial tomorrow   - Changed RR to 18  - Start tube feeds: nepro ok to start slowly  - Increased LR to 100cc/hr  - Per neuro: continue all AEDs and can start lurasidone    ===NEURO===  #Status Epilepticus  #Grand Mal Seizures  - Sedation: Precedex, Ativan  - AEDs: Keppra 1.5g BID, Lamotrigine 100mg BID, Gabapentin 300mg BID, Vimpat 150mg BID  - EEG pending  - Neuro following, appreciate recs    #Schizophrenia  #Depression  - Lurasidone 40mg daily, ok per neuro  - Escitalopram 15mg daily    ===CVS===  #HTN #HLD  - Atorvastatin 20mg qhs    #Hypotension  - Norepi wean as able, MAP>65    ===PULM===  #Airway precautions  #VAZQUEZ  - Wean down FIO2 to minimum  - Titrate vent settings based on SpO2 and blood gas  - Head elevation to 15-30 degrees    ===RENAL===  #Metabolic acidosis  #Lactic acidosis  #CKD Stage III  #Electrolytes  - LR at 100cc/hr  - Trending labs, monitor electrolytes, I/O    #At risk of rhabdomyolysis  - Will monitor CK    ******GASTROINTESTINAL************  #Diet,NPO #OG  - Start Tube feeds    ******INFECTIOUS DISEASE************  #Aspiration PNA  #Flu A positive  - Ceftriaxone 1g daily (1/17-1/22)  - Tamiflu 30mg BID (1/18-1/22)  - Follow up if correct abx (was told zosyn but never got and has PCN allergy)  - Follow cultures: BCx, Bronch    ******ENDOCRINE************  #At risk of hypoglycemia  #Hx of secondary hyperparathyroidism  - FS q6h while NPO  - Follow thyroid studies, pending    ******HEME/ONC************  #Metastatic testicular cancer, recent chemo 06/2024  #DVT ppx  - subQ heparin    ===ETHICS===  #Code Status: FULL CODE    Signed,  Brittani Virgen  PGY1-Emergency Medicine ASSESSMENT & PLAN  69M w/ hx of metastatic testicular cancer (s/p L orchiectomy, on etoposide/cisplatin chemo, last dose 6/2024), epilepsy (grand-mal seizures), schizophrenia, developmental delay, HTN, HLD, VAZQUEZ, stage III CKD, obesity, secondary hyperparathyroidism, anemia, thrombocytopenia who was admitted on 1/16/25 for fall and syncope. On 1/17 patient had multiple RRTs called for grand mal seizure activity and was initially recommended sepsis workup and antipyretics given T103F, but patient had x2 more episode and was ultimately given ativan 2mg, vimpat load 200mg, and intubated for airway protection with MICU transfer.     CHANGES TODAY  - Stopping propofol, prefer precedex for taper to assess mental status  - vEEG for spontaneous awake trial tomorrow   - Changed RR to 18  - Start tube feeds: nepro ok to start slowly  - Increased LR to 100cc/hr  - Per neuro: continue all AEDs and can start lurasidone    ===NEURO===  #Status Epilepticus  #Grand Mal Seizures  - Sedation: Precedex, Ativan  - AEDs: Keppra 1.5g BID, Lamotrigine 100mg BID, Gabapentin 300mg BID, Vimpat 150mg BID  - EEG pending  - Neuro following, appreciate recs    #Schizophrenia  #Depression  - Lurasidone 40mg daily, ok per neuro  - Escitalopram 15mg daily    ===CVS===  #HTN #HLD   - Atorvastatin 20mg qhs    #Hypotension w/ propofol use  - Norepi wean as able, MAP>65    #Widened Mediastinum  #Difference in BP in Extremities  #PAD w/ R foot cooler than L  Noticed during RRT: SBP 200s in the R arm and 140s in the L arm, which persisted on subsequent readings. On repeat in MICU, patient with 95/57 on RA and 110/63 on LA. Limited by body habitus, positioning, IV lines in upper extremities, PAD. CXR showed widened mediastinum. CT chest noncon on 1/17 did not comment. Patient is hemodynamically stable, no increasing lactic acidosis, and has no other signs for dissection, but with pulse pressure variation and widened mediastinum in the setting of initial complaint of syncope, will continue to monitor.   - CTM, if having true concern for dissection, would perform CTA despite CKD III and maintain T&S q72    ===PULM===  #Airway precautions  #VAZQUEZ  - Wean down FIO2 to minimum  - Titrate vent settings based on SpO2 and blood gas  - Head elevation to 15-30 degrees    ===RENAL===  #Metabolic acidosis  #Lactic acidosis  #CKD Stage III  #Electrolytes  - LR at 100cc/hr  - Trending labs, monitor electrolytes, I/O    #At risk of rhabdomyolysis  - Will monitor CK    ******GASTROINTESTINAL************  #Diet,NPO #OG  - Start Tube feeds    ******INFECTIOUS DISEASE************  #Aspiration PNA  #Flu A positive  - Ceftriaxone 1g daily (1/17-1/22)  - Tamiflu 30mg BID (1/18-1/22)  - Follow up if correct abx (was told zosyn but never got and has PCN allergy)  - Follow cultures: BCx, Bronch    ******ENDOCRINE************  #At risk of hypoglycemia  #Hx of secondary hyperparathyroidism  - FS q6h while NPO  - Follow thyroid studies, pending    ******HEME/ONC************  #Metastatic testicular cancer, recent chemo 06/2024  #DVT ppx  - subQ heparin    ===ETHICS===  #Code Status: FULL CODE    Signed,  Brittani Virgen  PGY1-Emergency Medicine

## 2025-01-18 NOTE — EEG REPORT - NS EEG TEXT BOX
REPORT OF CONTINUOUS VIDEO EEG    [facility]: [address], [place] [address], Phone: [phone]  [facility]: [address] [address], Phone: [phone]  [facility]: [address] [place] [address], Phone: [phone]    Patient Name: Tay Gr   Age: 69 year, : 1955  [place]: [place] [id number]  Referring Physician: -    Study Start: 2025	00:50:43 AM      Study End:  2025	08:00     Study Duration: 06 hr  13 min    -------------------------------------------------------------------------------------------------------  EEG Recording Technique:  The patient underwent continuous Video-EEG monitoring, using Telemetry System hardware on the XLTek Digital System. EEG and video data were stored on a computer hard drive with important events saved in digital archive files. The material was reviewed by a physician (electroencephalographer / epileptologist) on a daily basis. [place]ong and seizure detection algorithms were utilized and reviewed. An EEG Technician attended to the patient, and was available throughout daytime work hours.  The epilepsy center neurologist was available in person or on call 24-hours per day.    EEG Placement and Labeling of Electrodes:  The EEG was performed utilizing 20 channel referential EEG connections (coronal over temporal over parasagittal montage) using all standard 10-20 electrode placements with EKG, with additional electrodes placed in the inferior temporal region using the modified 10-10 montage electrode placements for elective admissions, or if deemed necessary. Recording was at a sampling rate of 256 samples per second per channel. Time synchronized digital video recording was done simultaneously with EEG recording. A low light infrared camera was used for low light recording.     -------------------------------------------------------------------------------------------------------  History: -  69 year old Male with seizure-like activity    Medication  Levophed    Artifical    Diprivan (Propofol)    Keppra (Levetiracetam)    Vimpat (Lacosamide)    Neurontin    Laxapro    Precedex    Lipitor      -------------------------------------------------------------------------------------------------------  Interpretation:    [[[Abbreviation Key:  PDR=alpha rhythm/posterior dominant rhythm. A-P=anterior posterior.  Amplitude: ‘very low’:<20; ‘low’:20-49; ‘medium’:; ‘high’:>150uV.  Persistence for periodic/rhythmic patterns (% of epoch) ‘rare’:<1%; ‘occasional’:1-10%; ‘frequent’:10-50%; ‘abundant’:50-90%; ‘continuous’:>90%.  Persistence for sporadic discharges: ‘rare’:<1/hr; ‘occasional’:1/min-1/hr; ‘frequent’:>1/min; ‘abundant’:>1/10 sec.  RPP=rhythmic and periodic patterns; GRDA=generalized rhythmic delta activity; FIRDA=frontal intermittent GRDA; LRDA=lateralized rhythmic delta activity; TIRDA=temporal intermittent rhythmic delta activity;  LPD=PLED=lateralized periodic discharges; GPD=generalized periodic discharges; BIPDs =bilateral independent periodic discharges; Mf=multifocal; SIRPDs=stimulus induced rhythmic, periodic, or ictal appearing discharges; BIRDs=brief potentially ictal rhythmic discharges >4 Hz, lasting .5-10s; PFA (paroxysmal bursts >13 Hz or =8 Hz <10s).  Modifiers: +F=with fast component; +S=with spike component; +R=with rhythmic component.  S-B=burst suppression pattern.  Max=maximal. N1-drowsy; N2-stage II sleep; N3-slow wave sleep. SSS/BETS=small sharp spikes/benign epileptiform transients of sleep. HV=hyperventilation; PS=photic stimulation]]]    FINDINGS:      Background:  SYMMETRY: symmetric  CONTINUOUS: nearly continuous  PDR: absent  REACTIVITY: present  VOLTAGE: normal, [defined typically between 20-150uV]  AP GRADIENT: absent  BREACH: absent  OTHER: none    Background Slowing:  GENERALIZED SLOWING: present. Background is diffusely slow consisting of polymorphic delta theta activity with superimposed fast frequency    FOCAL SLOWING: none was present.    State Changes:     Drowsiness and sleep did not occur    Sporadic Epileptiform Discharges:   None    Rhythmic and Periodic Patterns (RPPs):  None     Electrographic and Electroclinical seizures:  None    Other Clinical Events:  None    Activation Procedures:   Hyperventilation was not performed.      Photic stimulation was performed and did not elicit any abnormalities.        Artifacts:  Intermittent myogenic and movement artifacts were noted.    ECG:  The heart rate on single channel ECG was predominantly between 60-70 BPM.  -------------------------------------------------------------------------------------------------------  EEG Classification:    Abnormal EEG in lethargic state  1. Moderate diffuse background slowing    -------------------------------------------------------------------------------------------------------  EEG Impression / Clinical Correlate:    Abnormal prolonged EEG study due to Moderate diffuse cerebral dysfunction that is not specific in etiology or at least partly due to medications    No epileptic discharges recorded.  No seizures recorded.      -------------------------------------------------------------------------------------------------------  GONZALO Kapoor  Attending Physician, [facility]    ------------------------------------  EEG Reading Room: [phone]  On Call Service After Hours: [phone]

## 2025-01-18 NOTE — PROGRESS NOTE ADULT - SUBJECTIVE AND OBJECTIVE BOX
[facility]  Shiraz Liu MD PhD, Micaela Jurado MD, Criss Edge MD, Greyson Mart MD, Sajan Burger MD  and providing coverage with Irene Martinez MD  Providing Infectious Disease Consultations at [facility], [facility], [facility]'s    Office# [phone] to schedule follow up appointments  Answering Service for urgent calls or New Consults [phone]  Cell# to text for urgent issues Shiraz Liu [phone]     infectious diseases progress note:    OSCAR MILAN is a 69y y. o. Male patient    No concerning overnight events    Allergies    penicillin (Hives)  seasonal allergies (Unknown)    Intolerances    latex (Rash)      ANTIBIOTICS/RELEVANT:  antimicrobials  cefTRIAXone   IVPB 1000 milliGRAM(s) IV Intermittent every 24 hours  oseltamivir 30 milliGRAM(s) Oral two times a day    immunologic:  influenza  Vaccine (HIGH DOSE) 0.5 milliLiter(s) IntraMuscular once    OTHER:  atorvastatin 20 milliGRAM(s) Oral at bedtime  chlorhexidine 0.12% Liquid 15 milliLiter(s) Oral Mucosa every 12 hours  chlorhexidine 2% Cloths 1 Application(s) Topical <User Schedule>  dexMEDEtomidine Infusion 0.5 MICROgram(s)/kG/Hr IV Continuous <Continuous>  escitalopram 15 milliGRAM(s) Oral daily  gabapentin Solution 300 milliGRAM(s) Oral two times a day  heparin   Injectable 7500 Unit(s) SubCutaneous every 12 hours  lacosamide Solution 150 milliGRAM(s) Oral two times a day  lactated ringers. 1000 milliLiter(s) IV Continuous <Continuous>  lamoTRIgine 100 milliGRAM(s) Oral two times a day  levETIRAcetam  IVPB 1500 milliGRAM(s) IV Intermittent two times a day      Objective:  Vital Signs Last 24 Hrs  T(C): 37.7 (18 Jan 2025 15:00), Max: 39.2 (17 Jan 2025 21:02)  T(F): 99.9 (18 Jan 2025 15:00), Max: 102.6 (17 Jan 2025 21:02)  HR: 60 (18 Jan 2025 19:00) (55 - 92)  BP: 135/69 (18 Jan 2025 19:00) (83/45 - 154/72)  BP(mean): 95 (18 Jan 2025 19:00) (59 - 104)  RR: 18 (18 Jan 2025 19:00) (14 - 40)  SpO2: 99% (18 Jan 2025 19:00) (94% - 100%)    Parameters below as of 17 Jan 2025 21:02  Patient On (Oxygen Delivery Method): ventilator    O2 Concentration (%): 70    T(C): 37.7 (01-18-25 @ 15:00), Max: 39.2 (01-17-25 @ 21:02)  T(C): 37.7 (01-18-25 @ 15:00), Max: 39.2 (01-17-25 @ 21:02)  T(C): 37.7 (01-18-25 @ 15:00), Max: 39.2 (01-17-25 @ 21:02)    Mode: AC/ CMV (Assist Control/ Continuous Mandatory Ventilation), RR (machine): 18, TV (machine): 450, FiO2: 30, PEEP: 8, ITime: 1, MAP: 14, PIP: 30    LABS:                          10.7   9.51  )-----------( 87       ( 18 Jan 2025 15:08 )             31.8       9.51 01-18 @ 15:08  17.77 01-17 @ 22:20  13.82 01-17 @ 19:13  6.72 01-17 @ 07:02  8.38 01-16 @ 07:30      01-18    133[L]  |  102  |  41[H]  ----------------------------<  125[H]  5.1   |  16[L]  |  3.25[H]    Ca    8.4      18 Jan 2025 15:08  Phos  5.8     01-18  Mg     2.6     01-18    TPro  5.6[L]  /  Alb  3.1[L]  /  TBili  0.3  /  DBili  x   /  AST  49[H]  /  ALT  37  /  AlkPhos  92  01-18      Creatinine: 3.25 mg/dL (01-18-25 @ 15:08)  Creatinine: 2.56 mg/dL (01-17-25 @ 22:46)  Creatinine: 2.55 mg/dL (01-17-25 @ 19:13)  Creatinine: 2.27 mg/dL (01-17-25 @ 12:49)  Creatinine: 2.79 mg/dL (01-16-25 @ 07:30)      PT/INR - ( 17 Jan 2025 22:20 )   PT: 11.4 sec;   INR: 0.99 ratio         PTT - ( 17 Jan 2025 22:20 )  PTT:23.9 sec  Urinalysis Basic - ( 18 Jan 2025 15:08 )    Color: x / Appearance: x / SG: x / pH: x  Gluc: 125 mg/dL / Ketone: x  / Bili: x / Urobili: x   Blood: x / Protein: x / Nitrite: x   Leuk Esterase: x / RBC: x / WBC x   Sq Epi: x / Non Sq Epi: x / Bacteria: x            INFLAMMATORY MARKERS    MICROBIOLOGY:  Culture Results:   No growth (01-18 @ 00:36)        RADIOLOGY & ADDITIONAL STUDIES:

## 2025-01-19 LAB
ADD ON TEST-SPECIMEN IN LAB: SIGNIFICANT CHANGE UP
ALBUMIN SERPL ELPH-MCNC: 3.1 G/DL — LOW (ref 3.3–5)
ALBUMIN SERPL ELPH-MCNC: 3.5 G/DL — SIGNIFICANT CHANGE UP (ref 3.3–5)
ALP SERPL-CCNC: 119 U/L — SIGNIFICANT CHANGE UP (ref 40–120)
ALP SERPL-CCNC: 91 U/L — SIGNIFICANT CHANGE UP (ref 40–120)
ALT FLD-CCNC: 38 U/L — SIGNIFICANT CHANGE UP (ref 10–45)
ALT FLD-CCNC: 52 U/L — HIGH (ref 10–45)
ANION GAP SERPL CALC-SCNC: 13 MMOL/L — SIGNIFICANT CHANGE UP (ref 5–17)
ANION GAP SERPL CALC-SCNC: 16 MMOL/L — SIGNIFICANT CHANGE UP (ref 5–17)
AST SERPL-CCNC: 43 U/L — HIGH (ref 10–40)
AST SERPL-CCNC: 63 U/L — HIGH (ref 10–40)
BILIRUB SERPL-MCNC: 0.2 MG/DL — SIGNIFICANT CHANGE UP (ref 0.2–1.2)
BILIRUB SERPL-MCNC: 0.3 MG/DL — SIGNIFICANT CHANGE UP (ref 0.2–1.2)
BUN SERPL-MCNC: 38 MG/DL — HIGH (ref 7–23)
BUN SERPL-MCNC: 41 MG/DL — HIGH (ref 7–23)
CALCIUM SERPL-MCNC: 8.6 MG/DL — SIGNIFICANT CHANGE UP (ref 8.4–10.5)
CALCIUM SERPL-MCNC: 9.2 MG/DL — SIGNIFICANT CHANGE UP (ref 8.4–10.5)
CHLORIDE SERPL-SCNC: 103 MMOL/L — SIGNIFICANT CHANGE UP (ref 96–108)
CHLORIDE SERPL-SCNC: 106 MMOL/L — SIGNIFICANT CHANGE UP (ref 96–108)
CK SERPL-CCNC: 736 U/L — HIGH (ref 30–200)
CK SERPL-CCNC: 793 U/L — HIGH (ref 30–200)
CO2 SERPL-SCNC: 15 MMOL/L — LOW (ref 22–31)
CO2 SERPL-SCNC: 17 MMOL/L — LOW (ref 22–31)
CREAT SERPL-MCNC: 2.57 MG/DL — HIGH (ref 0.5–1.3)
CREAT SERPL-MCNC: 3.14 MG/DL — HIGH (ref 0.5–1.3)
CULTURE RESULTS: NO GROWTH — SIGNIFICANT CHANGE UP
EGFR: 21 ML/MIN/1.73M2 — LOW
EGFR: 27 ML/MIN/1.73M2 — LOW
GAS PNL BLDA: SIGNIFICANT CHANGE UP
GLUCOSE BLDC GLUCOMTR-MCNC: 147 MG/DL — HIGH (ref 70–99)
GLUCOSE SERPL-MCNC: 126 MG/DL — HIGH (ref 70–99)
GLUCOSE SERPL-MCNC: 140 MG/DL — HIGH (ref 70–99)
HCT VFR BLD CALC: 31.9 % — LOW (ref 39–50)
HGB BLD-MCNC: 10.4 G/DL — LOW (ref 13–17)
LEVETIRACETAM SERPL-MCNC: 32.6 UG/ML — SIGNIFICANT CHANGE UP (ref 10–40)
MAGNESIUM SERPL-MCNC: 2.4 MG/DL — SIGNIFICANT CHANGE UP (ref 1.6–2.6)
MAGNESIUM SERPL-MCNC: 2.6 MG/DL — SIGNIFICANT CHANGE UP (ref 1.6–2.6)
MCHC RBC-ENTMCNC: 31 PG — SIGNIFICANT CHANGE UP (ref 27–34)
MCHC RBC-ENTMCNC: 32.6 G/DL — SIGNIFICANT CHANGE UP (ref 32–36)
MCV RBC AUTO: 95.2 FL — SIGNIFICANT CHANGE UP (ref 80–100)
NRBC # BLD: 0 /100 WBCS — SIGNIFICANT CHANGE UP (ref 0–0)
NRBC BLD-RTO: 0 /100 WBCS — SIGNIFICANT CHANGE UP (ref 0–0)
PHOSPHATE SERPL-MCNC: 4.1 MG/DL — SIGNIFICANT CHANGE UP (ref 2.5–4.5)
PHOSPHATE SERPL-MCNC: 5.4 MG/DL — HIGH (ref 2.5–4.5)
PLATELET # BLD AUTO: 76 K/UL — LOW (ref 150–400)
POTASSIUM SERPL-MCNC: 4.5 MMOL/L — SIGNIFICANT CHANGE UP (ref 3.5–5.3)
POTASSIUM SERPL-MCNC: 4.6 MMOL/L — SIGNIFICANT CHANGE UP (ref 3.5–5.3)
POTASSIUM SERPL-SCNC: 4.5 MMOL/L — SIGNIFICANT CHANGE UP (ref 3.5–5.3)
POTASSIUM SERPL-SCNC: 4.6 MMOL/L — SIGNIFICANT CHANGE UP (ref 3.5–5.3)
PROT SERPL-MCNC: 5.3 G/DL — LOW (ref 6–8.3)
PROT SERPL-MCNC: 6.2 G/DL — SIGNIFICANT CHANGE UP (ref 6–8.3)
RBC # BLD: 3.35 M/UL — LOW (ref 4.2–5.8)
RBC # FLD: 12.8 % — SIGNIFICANT CHANGE UP (ref 10.3–14.5)
SODIUM SERPL-SCNC: 133 MMOL/L — LOW (ref 135–145)
SODIUM SERPL-SCNC: 137 MMOL/L — SIGNIFICANT CHANGE UP (ref 135–145)
SPECIMEN SOURCE: SIGNIFICANT CHANGE UP
WBC # BLD: 9.51 K/UL — SIGNIFICANT CHANGE UP (ref 3.8–10.5)
WBC # FLD AUTO: 9.51 K/UL — SIGNIFICANT CHANGE UP (ref 3.8–10.5)

## 2025-01-19 PROCEDURE — 99291 CRITICAL CARE FIRST HOUR: CPT

## 2025-01-19 PROCEDURE — 95718 EEG PHYS/QHP 2-12 HR W/VEEG: CPT

## 2025-01-19 PROCEDURE — 99291 CRITICAL CARE FIRST HOUR: CPT | Mod: FS

## 2025-01-19 RX ORDER — BACTERIOSTATIC SODIUM CHLORIDE 0.9 %
4 VIAL (ML) INJECTION EVERY 6 HOURS
Refills: 0 | Status: DISCONTINUED | OUTPATIENT
Start: 2025-01-19 | End: 2025-01-20

## 2025-01-19 RX ORDER — BUMETANIDE 2 MG/1
2 TABLET ORAL ONCE
Refills: 0 | Status: COMPLETED | OUTPATIENT
Start: 2025-01-19 | End: 2025-01-19

## 2025-01-19 RX ORDER — FENTANYL CITRATE 50 UG/ML
50 INJECTION INTRAMUSCULAR; INTRAVENOUS ONCE
Refills: 0 | Status: DISCONTINUED | OUTPATIENT
Start: 2025-01-19 | End: 2025-01-19

## 2025-01-19 RX ORDER — HYDRALAZINE HCL 100 MG
5 TABLET ORAL ONCE
Refills: 0 | Status: DISCONTINUED | OUTPATIENT
Start: 2025-01-19 | End: 2025-01-19

## 2025-01-19 RX ORDER — LACOSAMIDE 200 MG/1
150 TABLET, FILM COATED ORAL EVERY 12 HOURS
Refills: 0 | Status: DISCONTINUED | OUTPATIENT
Start: 2025-01-19 | End: 2025-01-22

## 2025-01-19 RX ORDER — IPRATROPIUM BROMIDE AND ALBUTEROL SULFATE .5; 2.5 MG/3ML; MG/3ML
3 SOLUTION RESPIRATORY (INHALATION) EVERY 6 HOURS
Refills: 0 | Status: DISCONTINUED | OUTPATIENT
Start: 2025-01-19 | End: 2025-01-23

## 2025-01-19 RX ORDER — HYDRALAZINE HCL 100 MG
10 TABLET ORAL ONCE
Refills: 0 | Status: COMPLETED | OUTPATIENT
Start: 2025-01-19 | End: 2025-01-19

## 2025-01-19 RX ORDER — HYDRALAZINE HCL 100 MG
5 TABLET ORAL ONCE
Refills: 0 | Status: COMPLETED | OUTPATIENT
Start: 2025-01-19 | End: 2025-01-19

## 2025-01-19 RX ORDER — ATORVASTATIN CALCIUM 80 MG/1
20 TABLET, FILM COATED ORAL AT BEDTIME
Refills: 0 | Status: DISCONTINUED | OUTPATIENT
Start: 2025-01-19 | End: 2025-01-23

## 2025-01-19 RX ORDER — ACETAMINOPHEN 160 MG/5ML
1000 SUSPENSION ORAL ONCE
Refills: 0 | Status: COMPLETED | OUTPATIENT
Start: 2025-01-19 | End: 2025-01-19

## 2025-01-19 RX ORDER — IPRATROPIUM BROMIDE AND ALBUTEROL SULFATE .5; 2.5 MG/3ML; MG/3ML
3 SOLUTION RESPIRATORY (INHALATION) ONCE
Refills: 0 | Status: COMPLETED | OUTPATIENT
Start: 2025-01-19 | End: 2025-01-19

## 2025-01-19 RX ADMIN — CEFTRIAXONE 100 MILLIGRAM(S): 250 INJECTION, POWDER, FOR SOLUTION INTRAMUSCULAR; INTRAVENOUS at 21:50

## 2025-01-19 RX ADMIN — BUMETANIDE 2 MILLIGRAM(S): 2 TABLET ORAL at 19:15

## 2025-01-19 RX ADMIN — Medication 10 MILLIGRAM(S): at 18:51

## 2025-01-19 RX ADMIN — ANTISEPTIC SURGICAL SCRUB 15 MILLILITER(S): 0.04 SOLUTION TOPICAL at 05:09

## 2025-01-19 RX ADMIN — ACETAMINOPHEN 1000 MILLIGRAM(S): 160 SUSPENSION ORAL at 20:00

## 2025-01-19 RX ADMIN — IPRATROPIUM BROMIDE AND ALBUTEROL SULFATE 3 MILLILITER(S): .5; 2.5 SOLUTION RESPIRATORY (INHALATION) at 23:15

## 2025-01-19 RX ADMIN — FENTANYL CITRATE 50 MICROGRAM(S): 50 INJECTION INTRAMUSCULAR; INTRAVENOUS at 00:15

## 2025-01-19 RX ADMIN — Medication 7500 UNIT(S): at 17:16

## 2025-01-19 RX ADMIN — ESCITALOPRAM 15 MILLIGRAM(S): 10 TABLET, FILM COATED ORAL at 11:10

## 2025-01-19 RX ADMIN — FENTANYL CITRATE 50 MICROGRAM(S): 50 INJECTION INTRAMUSCULAR; INTRAVENOUS at 00:25

## 2025-01-19 RX ADMIN — Medication 10 MILLIGRAM(S): at 05:43

## 2025-01-19 RX ADMIN — Medication 5 MILLIGRAM(S): at 03:34

## 2025-01-19 RX ADMIN — GABAPENTIN 300 MILLIGRAM(S): 800 TABLET ORAL at 17:15

## 2025-01-19 RX ADMIN — OSELTAMIVIR PHOSPHATE 30 MILLIGRAM(S): 75 CAPSULE ORAL at 05:09

## 2025-01-19 RX ADMIN — ACETAMINOPHEN 400 MILLIGRAM(S): 160 SUSPENSION ORAL at 19:20

## 2025-01-19 RX ADMIN — LEVETIRACETAM 400 MILLIGRAM(S): 750 TABLET, FILM COATED ORAL at 05:10

## 2025-01-19 RX ADMIN — LACOSAMIDE 150 MILLIGRAM(S): 200 TABLET, FILM COATED ORAL at 17:15

## 2025-01-19 RX ADMIN — OSELTAMIVIR PHOSPHATE 30 MILLIGRAM(S): 75 CAPSULE ORAL at 17:15

## 2025-01-19 RX ADMIN — LAMOTRIGINE 100 MILLIGRAM(S): 100 TABLET ORAL at 17:16

## 2025-01-19 RX ADMIN — Medication 7500 UNIT(S): at 05:09

## 2025-01-19 RX ADMIN — IPRATROPIUM BROMIDE AND ALBUTEROL SULFATE 3 MILLILITER(S): .5; 2.5 SOLUTION RESPIRATORY (INHALATION) at 18:55

## 2025-01-19 RX ADMIN — LACOSAMIDE 150 MILLIGRAM(S): 200 TABLET, FILM COATED ORAL at 05:08

## 2025-01-19 RX ADMIN — ANTISEPTIC SURGICAL SCRUB 1 APPLICATION(S): 0.04 SOLUTION TOPICAL at 05:43

## 2025-01-19 RX ADMIN — LAMOTRIGINE 100 MILLIGRAM(S): 100 TABLET ORAL at 05:09

## 2025-01-19 RX ADMIN — Medication 4 MILLILITER(S): at 23:15

## 2025-01-19 RX ADMIN — LEVETIRACETAM 400 MILLIGRAM(S): 750 TABLET, FILM COATED ORAL at 17:15

## 2025-01-19 RX ADMIN — GABAPENTIN 300 MILLIGRAM(S): 800 TABLET ORAL at 05:08

## 2025-01-19 NOTE — PROGRESS NOTE ADULT - SUBJECTIVE AND OBJECTIVE BOX
***************************************************************  Ivan Gonzalez  (PGY1) Internal Medicine  On TEAMS  ***************************************************************    PROGRESS NOTE:     Patient is a 69y old  Male who presents with a chief complaint of seizure, flu, elevated trop (18 Jan 2025 19:47)        INTERVAL EVENTS:   SUBJECTIVE / OVERNIGHT EVENTS: Patient was hypertensive overnight to the 170s, hydralazine was administered twice overnight, with blood pressures now in the 130s. Patient was seen and examined by the bedside this AM.      MEDICATIONS  (STANDING):  atorvastatin 20 milliGRAM(s) Oral at bedtime  cefTRIAXone   IVPB 1000 milliGRAM(s) IV Intermittent every 24 hours  chlorhexidine 0.12% Liquid 15 milliLiter(s) Oral Mucosa every 12 hours  chlorhexidine 2% Cloths 1 Application(s) Topical <User Schedule>  dexMEDEtomidine Infusion 0.5 MICROgram(s)/kG/Hr (13 mL/Hr) IV Continuous <Continuous>  escitalopram 15 milliGRAM(s) Oral daily  gabapentin Solution 300 milliGRAM(s) Oral two times a day  heparin   Injectable 7500 Unit(s) SubCutaneous every 12 hours  influenza  Vaccine (HIGH DOSE) 0.5 milliLiter(s) IntraMuscular once  lacosamide Solution 150 milliGRAM(s) Oral two times a day  lactated ringers. 1000 milliLiter(s) (100 mL/Hr) IV Continuous <Continuous>  lamoTRIgine 100 milliGRAM(s) Oral two times a day  levETIRAcetam  IVPB 1500 milliGRAM(s) IV Intermittent two times a day  oseltamivir 30 milliGRAM(s) Oral two times a day    MEDICATIONS  (PRN):      CAPILLARY BLOOD GLUCOSE      POCT Blood Glucose.: 147 mg/dL (19 Jan 2025 06:05)    I&O's Summary    18 Jan 2025 07:01  -  19 Jan 2025 07:00  --------------------------------------------------------  IN: 3857 mL / OUT: 1062 mL / NET: 2795 mL    19 Jan 2025 07:01  -  19 Jan 2025 07:28  --------------------------------------------------------  IN: 112.9 mL / OUT: 0 mL / NET: 112.9 mL        PHYSICAL EXAM:  Vital Signs Last 24 Hrs  T(C): 37.9 (19 Jan 2025 04:00), Max: 38.1 (19 Jan 2025 00:00)  T(F): 100.2 (19 Jan 2025 04:00), Max: 100.6 (19 Jan 2025 00:00)  HR: 66 (19 Jan 2025 06:00) (56 - 66)  BP: 133/63 (19 Jan 2025 06:00) (88/48 - 186/81)  BP(mean): 90 (19 Jan 2025 06:00) (67 - 120)  RR: 20 (19 Jan 2025 06:00) (13 - 40)  SpO2: 95% (19 Jan 2025 06:00) (94% - 100%)    Parameters below as of 19 Jan 2025 04:00  Patient On (Oxygen Delivery Method): ventilator    O2 Concentration (%): 30    PHYSICAL EXAM:  GENERAL: NAD  HEAD:  Atraumatic, Normocephalic  EYES: EOMI, PERRLA, conjunctiva and sclera clear  ENMT: No tonsillar erythema, exudates, or enlargement_____  NECK: Supple, No JVD, Normal thyroid  NERVOUS SYSTEM:  Alert & Oriented X_________  CHEST/LUNG: ___________  HEART: Regular rate and rhythm; No murmurs, rubs, or gallops  ABDOMEN: Soft, Nontender, Nondistended; Bowel sounds present  EXTREMITIES:  2+ Peripheral Pulses, No clubbing, cyanosis, or edema  LYMPH: No lymphadenopathy noted  SKIN: No rashes or lesions    LABS:                        10.4   9.51  )-----------( 76       ( 18 Jan 2025 23:35 )             31.9     01-18    133[L]  |  103  |  41[H]  ----------------------------<  140[H]  4.5   |  17[L]  |  3.14[H]    Ca    8.6      18 Jan 2025 23:35  Phos  5.4     01-18  Mg     2.6     01-18    TPro  5.3[L]  /  Alb  3.1[L]  /  TBili  0.2  /  DBili  x   /  AST  43[H]  /  ALT  38  /  AlkPhos  91  01-18    PT/INR - ( 18 Jan 2025 23:35 )   PT: 11.5 sec;   INR: 1.01 ratio         PTT - ( 18 Jan 2025 23:35 )  PTT:34.3 sec  CARDIAC MARKERS ( 17 Jan 2025 19:13 )  x     / x     / x     / x     / 4.6 ng/mL      Urinalysis Basic - ( 18 Jan 2025 23:35 )    Color: x / Appearance: x / SG: x / pH: x  Gluc: 140 mg/dL / Ketone: x  / Bili: x / Urobili: x   Blood: x / Protein: x / Nitrite: x   Leuk Esterase: x / RBC: x / WBC x   Sq Epi: x / Non Sq Epi: x / Bacteria: x        Culture - Sputum (collected 18 Jan 2025 00:36)  Source: .Sputum Sputum  Gram Stain (18 Jan 2025 06:52):    Moderate polymorphonuclear leukocytes per low power field    No Squamous epithelial cells per low power field    No organisms seen per oil power field  Preliminary Report (18 Jan 2025 18:01):    No growth    Culture - Blood (collected 17 Jan 2025 15:10)  Source: .Blood BLOOD  Preliminary Report (18 Jan 2025 20:01):    No growth at 24 hours    Culture - Blood (collected 17 Jan 2025 14:51)  Source: .Blood BLOOD  Preliminary Report (18 Jan 2025 20:01):    No growth at 24 hours        COORDINATION OF CARE:  Care Discussed with Consultants/Other Providers [Y/N]:  Prior or Outpatient Records Reviewed [Y/N]: ***************************************************************  Ivan Gonzalez  (PGY1) Internal Medicine  On TEAMS  ***************************************************************    PROGRESS NOTE:     Patient is a 69y old  Male who presents with a chief complaint of seizure, flu, elevated trop (18 Jan 2025 19:47)        INTERVAL EVENTS:   SUBJECTIVE / OVERNIGHT EVENTS: Patient was hypertensive overnight to the 170s, hydralazine was administered twice overnight, with blood pressures now in the 130s. Patient was seen and examined by the bedside this AM. Intubated and sedated, not in acute distress.      MEDICATIONS  (STANDING):  atorvastatin 20 milliGRAM(s) Oral at bedtime  cefTRIAXone   IVPB 1000 milliGRAM(s) IV Intermittent every 24 hours  chlorhexidine 0.12% Liquid 15 milliLiter(s) Oral Mucosa every 12 hours  chlorhexidine 2% Cloths 1 Application(s) Topical <User Schedule>  dexMEDEtomidine Infusion 0.5 MICROgram(s)/kG/Hr (13 mL/Hr) IV Continuous <Continuous>  escitalopram 15 milliGRAM(s) Oral daily  gabapentin Solution 300 milliGRAM(s) Oral two times a day  heparin   Injectable 7500 Unit(s) SubCutaneous every 12 hours  influenza  Vaccine (HIGH DOSE) 0.5 milliLiter(s) IntraMuscular once  lacosamide Solution 150 milliGRAM(s) Oral two times a day  lactated ringers. 1000 milliLiter(s) (100 mL/Hr) IV Continuous <Continuous>  lamoTRIgine 100 milliGRAM(s) Oral two times a day  levETIRAcetam  IVPB 1500 milliGRAM(s) IV Intermittent two times a day  oseltamivir 30 milliGRAM(s) Oral two times a day    MEDICATIONS  (PRN):      CAPILLARY BLOOD GLUCOSE      POCT Blood Glucose.: 147 mg/dL (19 Jan 2025 06:05)    I&O's Summary    18 Jan 2025 07:01  -  19 Jan 2025 07:00  --------------------------------------------------------  IN: 3857 mL / OUT: 1062 mL / NET: 2795 mL    19 Jan 2025 07:01  -  19 Jan 2025 07:28  --------------------------------------------------------  IN: 112.9 mL / OUT: 0 mL / NET: 112.9 mL        PHYSICAL EXAM:  Vital Signs Last 24 Hrs  T(C): 37.9 (19 Jan 2025 04:00), Max: 38.1 (19 Jan 2025 00:00)  T(F): 100.2 (19 Jan 2025 04:00), Max: 100.6 (19 Jan 2025 00:00)  HR: 66 (19 Jan 2025 06:00) (56 - 66)  BP: 133/63 (19 Jan 2025 06:00) (88/48 - 186/81)  BP(mean): 90 (19 Jan 2025 06:00) (67 - 120)  RR: 20 (19 Jan 2025 06:00) (13 - 40)  SpO2: 95% (19 Jan 2025 06:00) (94% - 100%)    Parameters below as of 19 Jan 2025 04:00  Patient On (Oxygen Delivery Method): ventilator    O2 Concentration (%): 30    PHYSICAL EXAM:  General: NAD, intubated and sedated  HEENT: vEEG ongoing, R forehead ecchymosis  Heart: Regular rate, rhythm. Normal S1/S2. No murmurs.  Lungs: Expiratory wheeze prolonged bilaterally, distant sounds through chest wall  GI: Soft, obese, non-tender.   Neuro: Intubated, sedated, vEEG ongoing  Ext: Peripheral pulses intact. No lower extremity edema. R foot warmer with stronger DP than L foot   Skin: Warm, well-perfused     LABS:                        10.4   9.51  )-----------( 76       ( 18 Jan 2025 23:35 )             31.9     01-18    133[L]  |  103  |  41[H]  ----------------------------<  140[H]  4.5   |  17[L]  |  3.14[H]    Ca    8.6      18 Jan 2025 23:35  Phos  5.4     01-18  Mg     2.6     01-18    TPro  5.3[L]  /  Alb  3.1[L]  /  TBili  0.2  /  DBili  x   /  AST  43[H]  /  ALT  38  /  AlkPhos  91  01-18    PT/INR - ( 18 Jan 2025 23:35 )   PT: 11.5 sec;   INR: 1.01 ratio         PTT - ( 18 Jan 2025 23:35 )  PTT:34.3 sec  CARDIAC MARKERS ( 17 Jan 2025 19:13 )  x     / x     / x     / x     / 4.6 ng/mL      Urinalysis Basic - ( 18 Jan 2025 23:35 )    Color: x / Appearance: x / SG: x / pH: x  Gluc: 140 mg/dL / Ketone: x  / Bili: x / Urobili: x   Blood: x / Protein: x / Nitrite: x   Leuk Esterase: x / RBC: x / WBC x   Sq Epi: x / Non Sq Epi: x / Bacteria: x        Culture - Sputum (collected 18 Jan 2025 00:36)  Source: .Sputum Sputum  Gram Stain (18 Jan 2025 06:52):    Moderate polymorphonuclear leukocytes per low power field    No Squamous epithelial cells per low power field    No organisms seen per oil power field  Preliminary Report (18 Jan 2025 18:01):    No growth    Culture - Blood (collected 17 Jan 2025 15:10)  Source: .Blood BLOOD  Preliminary Report (18 Jan 2025 20:01):    No growth at 24 hours    Culture - Blood (collected 17 Jan 2025 14:51)  Source: .Blood BLOOD  Preliminary Report (18 Jan 2025 20:01):    No growth at 24 hours        COORDINATION OF CARE:  Care Discussed with Consultants/Other Providers [Y/N]:  Prior or Outpatient Records Reviewed [Y/N]:

## 2025-01-19 NOTE — PROGRESS NOTE ADULT - ASSESSMENT
Assessment:  Patient OSCAR MILAN is a 69y (1955) with a PMHx significant for Epilepsy (febrile sz in childhood, GTC in adulthood, on Keppra, Lamictal and Gabapentin), metastatic testicular cancer on etoposide/cisplatin chemo, s/p L orchidectomy, hyperparathyroidism, obesity, VAZQUEZ, schizophrenia comes in after event of LOC in AM. Witnessed by neighbor, unclear description, unclear time period, no perceived aura, mild post event confusion, has head trauma, but no tongue bite, incontinence. Following with Dr. Lopez. He is on Keppra 1g BID, Lamotrigine 100mg BID, Gabapentin 300mg BID. No recent med changes. He is tested Flu +ve. Has fever, white count, high lactate. Had clinical episodes of possible GTC 1/17 and appeared postictal after. VS /150, HR 96, spo2 89. Lab work showed WBC 13.82, HCO3 14, AG 24, LA 9, CK 1015, . On exam, lethargic, intermittently blanking, but no gross focal deficits.     Impression: GTC. Acute worsening of frequency likely due to underlying infection. Rule out brain hemorrhage given recent headstrike.     Recs:  -dc EEG  - c/w IV Vimpat 150mg BID. and IV Keppra 1500mg BID.   -if has GTC for >3min and/or unstable vitals, give IV ativan 2mg  -sepsis workup and management as per primary team  -Consider MRI brain w and wo contrast to look for potential seizure foci that could cause increased frequency especially given hx of metastatic testicular cancer  -rest of care as per primary team    Patient seen and discussed w attending

## 2025-01-19 NOTE — EEG REPORT - NS EEG TEXT BOX
EEG Report [Charted Location: [place] [address]] [Authored: 2025 09:54]- for Visit: [phone], Complete, Entered, Signed in Full, Available to Patient    EEG REPORT:    EEG Report:  · EEG Report	     REPORT OF CONTINUOUS VIDEO EEG    [facility]: [address], [place] [address], Phone: [phone]  [facility]: [address] [facility], [place] [address], Phone: [phone]  [facility]: [address] [place] [address], Phone: [phone]    Patient Name: Tay Gr   Age: 69 year, : 1955  [place]: [place] [address]  Referring Physician: -    Study Start: 2025	08:00    Study End:  2025	24:00  Study Duration: 24 hr      -------------------------------------------------------------------------------------------------------  EEG Recording Technique:  The patient underwent continuous Video-EEG monitoring, using Telemetry System hardware on the XLTek Digital System. EEG and video data were stored on a computer hard drive with important events saved in digital archive files. The material was reviewed by a physician (electroencephalographer / epileptologist) on a daily basis. [place]ong and seizure detection algorithms were utilized and reviewed. An EEG Technician attended to the patient, and was available throughout daytime work hours.  The epilepsy center neurologist was available in person or on call 24-hours per day.    EEG Placement and Labeling of Electrodes:  The EEG was performed utilizing 20 channel referential EEG connections (coronal over temporal over parasagittal montage) using all standard 10-20 electrode placements with EKG, with additional electrodes placed in the inferior temporal region using the modified 10-10 montage electrode placements for elective admissions, or if deemed necessary. Recording was at a sampling rate of 256 samples per second per channel. Time synchronized digital video recording was done simultaneously with EEG recording. A low light infrared camera was used for low light recording.     -------------------------------------------------------------------------------------------------------  History: -  69 year old Male with seizure-like activity    Medication  Levophed    Artifical    Diprivan (Propofol)    Keppra (Levetiracetam)    Vimpat (Lacosamide)    Neurontin    Laxapro    Precedex    Lipitor      -------------------------------------------------------------------------------------------------------  Interpretation:    [[[Abbreviation Key:  PDR=alpha rhythm/posterior dominant rhythm. A-P=anterior posterior.  Amplitude: ‘very low’:<20; ‘low’:20-49; ‘medium’:; ‘high’:>150uV.  Persistence for periodic/rhythmic patterns (% of epoch) ‘rare’:<1%; ‘occasional’:1-10%; ‘frequent’:10-50%; ‘abundant’:50-90%; ‘continuous’:>90%.  Persistence for sporadic discharges: ‘rare’:<1/hr; ‘occasional’:1/min-1/hr; ‘frequent’:>1/min; ‘abundant’:>1/10 sec.  RPP=rhythmic and periodic patterns; GRDA=generalized rhythmic delta activity; FIRDA=frontal intermittent GRDA; LRDA=lateralized rhythmic delta activity; TIRDA=temporal intermittent rhythmic delta activity;  LPD=PLED=lateralized periodic discharges; GPD=generalized periodic discharges; BIPDs =bilateral independent periodic discharges; Mf=multifocal; SIRPDs=stimulus induced rhythmic, periodic, or ictal appearing discharges; BIRDs=brief potentially ictal rhythmic discharges >4 Hz, lasting .5-10s; PFA (paroxysmal bursts >13 Hz or =8 Hz <10s).  Modifiers: +F=with fast component; +S=with spike component; +R=with rhythmic component.  S-B=burst suppression pattern.  Max=maximal. N1-drowsy; N2-stage II sleep; N3-slow wave sleep. SSS/BETS=small sharp spikes/benign epileptiform transients of sleep. HV=hyperventilation; PS=photic stimulation]]]    FINDINGS:      Background:  SYMMETRY: symmetric  CONTINUOUS: nearly continuous  PDR: absent  REACTIVITY: present  VOLTAGE: normal, [defined typically between 20-150uV]  AP GRADIENT: absent  BREACH: absent  OTHER: none    Background Slowing:  GENERALIZED SLOWING: present. Background is diffusely slow consisting of polymorphic delta theta activity with superimposed fast frequency    FOCAL SLOWING: none was present.    State Changes:     Drowsiness and sleep did not occur    Sporadic Epileptiform Discharges:   None    Rhythmic and Periodic Patterns (RPPs):  None     Electrographic and Electroclinical seizures:  None    Other Clinical Events:  None    Activation Procedures:   Hyperventilation was not performed.      Photic stimulation was performed and did not elicit any abnormalities.        Artifacts:  Intermittent myogenic and movement artifacts were noted.    ECG:  The heart rate on single channel ECG was predominantly between 60-70 BPM.  -------------------------------------------------------------------------------------------------------  EEG Classification:    Abnormal EEG in lethargic state  1. Moderate diffuse background slowing    -------------------------------------------------------------------------------------------------------  EEG Impression / Clinical Correlate:    Abnormal prolonged EEG study due to Moderate diffuse cerebral dysfunction that is not specific in etiology or at least partly due to medications    No epileptic discharges recorded.  No seizures recorded.      -------------------------------------------------------------------------------------------------------  GONZALO Kapoor  Attending Physician, [facility]    ------------------------------------  EEG Reading Room: [phone]  On Call Service After Hours: [phone]

## 2025-01-19 NOTE — PROGRESS NOTE ADULT - ASSESSMENT
ASSESSMENT & PLAN  69M w/ hx of metastatic testicular cancer (s/p L orchiectomy, on etoposide/cisplatin chemo, last dose 6/2024), epilepsy (grand-mal seizures), schizophrenia, developmental delay, HTN, HLD, VAZQUEZ, stage III CKD, obesity, secondary hyperparathyroidism, anemia, thrombocytopenia who was admitted on 1/16/25 for fall and syncope. On 1/17 patient had multiple RRTs called for grand mal seizure activity and was initially recommended sepsis workup and antipyretics given T103F, but patient had x2 more episode and was ultimately given ativan 2mg, vimpat load 200mg, and intubated for airway protection with MICU transfer.     CHANGES TODAY  - Stopping propofol, prefer precedex for taper to assess mental status  - vEEG for spontaneous awake trial tomorrow   - Changed RR to 18  - Start tube feeds: nepro ok to start slowly  - Increased LR to 100cc/hr  - Per neuro: continue all AEDs and can start lurasidone    ===NEURO===  #Status Epilepticus  #Grand Mal Seizures  - Sedation: Precedex, Ativan  - AEDs: Keppra 1.5g BID, Lamotrigine 100mg BID, Gabapentin 300mg BID, Vimpat 150mg BID  - EEG pending  - Neuro following, appreciate recs    #Schizophrenia  #Depression  - Lurasidone 40mg daily, ok per neuro  - Escitalopram 15mg daily    ===CVS===  #HTN #HLD   - Atorvastatin 20mg qhs    #Hypotension w/ propofol use  - Norepi wean as able, MAP>65    #Widened Mediastinum  #Difference in BP in Extremities  #PAD w/ R foot cooler than L  Noticed during RRT: SBP 200s in the R arm and 140s in the L arm, which persisted on subsequent readings. On repeat in MICU, patient with 95/57 on RA and 110/63 on LA. Limited by body habitus, positioning, IV lines in upper extremities, PAD. CXR showed widened mediastinum. CT chest noncon on 1/17 did not comment. Patient is hemodynamically stable, no increasing lactic acidosis, and has no other signs for dissection, but with pulse pressure variation and widened mediastinum in the setting of initial complaint of syncope, will continue to monitor.   - CTM, if having true concern for dissection, would perform CTA despite CKD III and maintain T&S q72    ===PULM===  #Airway precautions  #VAZQUEZ  - Wean down FIO2 to minimum  - Titrate vent settings based on SpO2 and blood gas  - Head elevation to 15-30 degrees    ===RENAL===  #Metabolic acidosis  #Lactic acidosis  #CKD Stage III  #Electrolytes  - LR at 100cc/hr  - Trending labs, monitor electrolytes, I/O    #At risk of rhabdomyolysis  - Will monitor CK    ******GASTROINTESTINAL************  #Diet,NPO #OG  - Start Tube feeds    ******INFECTIOUS DISEASE************  #Aspiration PNA  #Flu A positive  - Ceftriaxone 1g daily (1/17-1/22)  - Tamiflu 30mg BID (1/18-1/22)  - Follow up if correct abx (was told zosyn but never got and has PCN allergy)  - Follow cultures: BCx, Bronch    ******ENDOCRINE************  #At risk of hypoglycemia  #Hx of secondary hyperparathyroidism  - FS q6h while NPO  - Follow thyroid studies, pending    ******HEME/ONC************  #Metastatic testicular cancer, recent chemo 06/2024  #DVT ppx  - subQ heparin    ===ETHICS===  #Code Status: FULL CODE   ASSESSMENT & PLAN  69M w/ hx of metastatic testicular cancer (s/p L orchiectomy, on etoposide/cisplatin chemo, last dose 6/2024), epilepsy (grand-mal seizures), schizophrenia, developmental delay, HTN, HLD, VAZQUEZ, stage III CKD, obesity, secondary hyperparathyroidism, anemia, thrombocytopenia who was admitted on 1/16/25 for fall and syncope. On 1/17 patient had multiple RRTs called for grand mal seizure activity and was initially recommended sepsis workup and antipyretics given T103F, but patient had x2 more episode and was ultimately given ativan 2mg, vimpat load 200mg, and intubated for airway protection with MICU transfer.     CHANGES TODAY  - vEEG for spontaneous awake trial tomorrow   - Off Levo, MAP >65    ===NEURO===  #Status Epilepticus  #Grand Mal Seizures  - Sedation: Precedex, Ativan  - AEDs: Keppra 1.5g BID, Lamotrigine 100mg BID, Gabapentin 300mg BID, Vimpat 150mg BID  - EEG pending  - Neuro following, appreciate recs    #Schizophrenia  #Depression  - Lurasidone 40mg daily, ok per neuro  - Escitalopram 15mg daily    ===CVS===  #HTN #HLD   - Atorvastatin 20mg qhs    #Hypotension w/ propofol use  Off of Levo, MAP >65     #Widened Mediastinum  #Difference in BP in Extremities  #PAD w/ R foot cooler than L  Noticed during RRT: SBP 200s in the R arm and 140s in the L arm, which persisted on subsequent readings. On repeat in MICU, patient with 95/57 on RA and 110/63 on LA. Limited by body habitus, positioning, IV lines in upper extremities, PAD. CXR showed widened mediastinum. CT chest noncon on 1/17 did not comment. Patient is hemodynamically stable, no increasing lactic acidosis, and has no other signs for dissection, but with pulse pressure variation and widened mediastinum in the setting of initial complaint of syncope, will continue to monitor.   - CTM, if having true concern for dissection, would perform CTA despite CKD III and maintain T&S q72    ===PULM===  #Airway precautions  #VAZQUEZ  - Wean down FIO2 to minimum  - Titrate vent settings based on SpO2 and blood gas  - Head elevation to 15-30 degrees    ===RENAL===  #Metabolic acidosis  #Lactic acidosis  #CKD Stage III  #Electrolytes  - LR at 100cc/hr  - Trending labs, monitor electrolytes, I/O    #At risk of rhabdomyolysis  - CK uptrended from the day prior, from 745 to 793    ******GASTROINTESTINAL************  #Diet,NPO #OG  - C/w tube feeds     ******INFECTIOUS DISEASE************  #Aspiration PNA  #Flu A positive  - Ceftriaxone 1g daily (1/17-1/22)  - Tamiflu 30mg BID (1/18-1/22)  - Follow up if correct abx (was told zosyn but never got and has PCN allergy)  - Follow cultures: BCx, Bronch    ******ENDOCRINE************  #At risk of hypoglycemia  #Hx of secondary hyperparathyroidism  - FS q6h while NPO  - Follow thyroid studies, pending    ******HEME/ONC************  #Metastatic testicular cancer, recent chemo 06/2024  #DVT ppx  - subQ heparin    ===ETHICS===  #Code Status: FULL CODE   ASSESSMENT & PLAN  69M w/ hx of metastatic testicular cancer (s/p L orchiectomy, on etoposide/cisplatin chemo, last dose 6/2024), epilepsy (grand-mal seizures), schizophrenia, developmental delay, HTN, HLD, VAZQUEZ, stage III CKD, obesity, secondary hyperparathyroidism, anemia, thrombocytopenia who was admitted on 1/16/25 for fall and syncope. On 1/17 patient had multiple RRTs called for grand mal seizure activity and was initially recommended sepsis workup and antipyretics given T103F, but patient had x2 more episode and was ultimately given ativan 2mg, vimpat load 200mg, and intubated for airway protection with MICU transfer.     CHANGES TODAY  - EEG no seizures recorded  - Extubation to BIPAP today   - Off Levo, MAP >65    ===NEURO===  #Status Epilepticus  #Grand Mal Seizures  - Sedation: Precedex, Ativan  - AEDs: Keppra 1.5g BID, Lamotrigine 100mg BID, Gabapentin 300mg BID, Vimpat 150mg BID  - EEG negative for seizures  - Neuro following, appreciate recs    #Schizophrenia  #Depression  - Lurasidone 40mg daily, ok per neuro  - Escitalopram 15mg daily    ===CVS===  #HTN #HLD   - Atorvastatin 20mg qhs    #Hypotension w/ propofol use  Off of Levo, MAP >65     #Widened Mediastinum  #Difference in BP in Extremities  #PAD w/ R foot cooler than L  Noticed during RRT: SBP 200s in the R arm and 140s in the L arm, which persisted on subsequent readings. On repeat in MICU, patient with 95/57 on RA and 110/63 on LA. Limited by body habitus, positioning, IV lines in upper extremities, PAD. CXR showed widened mediastinum. CT chest noncon on 1/17 did not comment. Patient is hemodynamically stable, no increasing lactic acidosis, and has no other signs for dissection, but with pulse pressure variation and widened mediastinum in the setting of initial complaint of syncope, will continue to monitor.   - CTM, if having true concern for dissection, would perform CTA despite CKD III and maintain T&S q72    ===PULM===  #Airway precautions  #VAZQUEZ  - Wean down FIO2 to minimum  - Titrate vent settings based on SpO2 and blood gas  - Head elevation to 15-30 degrees    ===RENAL===  #Metabolic acidosis  #Lactic acidosis  #CKD Stage III  #Electrolytes  - LR at 100cc/hr  - Trending labs, monitor electrolytes, I/O    #At risk of rhabdomyolysis  - CK uptrended from the day prior, from 745 to 793    ******GASTROINTESTINAL************  #Diet,NPO #OG  - C/w tube feeds     ******INFECTIOUS DISEASE************  #Aspiration PNA  #Flu A positive  - Ceftriaxone 1g daily (1/17-1/22)  - Tamiflu 30mg BID (1/18-1/22)  - Follow cultures: BCx, Bronch    ******ENDOCRINE************  #At risk of hypoglycemia  #Hx of secondary hyperparathyroidism  - FS q6h while NPO  - Follow thyroid studies, pending    ******HEME/ONC************  #Metastatic testicular cancer, recent chemo 06/2024  #DVT ppx  - subQ heparin    ===ETHICS===  #Code Status: FULL CODE

## 2025-01-19 NOTE — PROGRESS NOTE ADULT - SUBJECTIVE AND OBJECTIVE BOX
*************************************  NEUROLOGY PROGRESS NOTE  **************************************    OSCAR MILAN  Male  MRN-[id number]    HPI:  Patient OSCAR MILAN is a 69y (1955) with a PMHx significant for Epilepsy (febrile sz in childhood, GTC in adulthood, on Keppra, Lamictal and Gabapentin), metastatic testicular cancer on etoposide/cisplatin chemo, s/p L orchidectomy, hyperparathyroidism, obesity, VAZQUEZ, schizophrenia comes in after event of LOC in AM. At around 0500, he was near the mailbox, he was up since 0200, was feeling tired, next thing he remembers is being on floor, his neighbor asking him to keep laying on the floor, he was feeling a bit groggy but remembers going back into his apartment being transported by the EMS, and even gave sister's phone number to EMS. Unclear time period. He denies any tongue bite, urinary or bowel incontinence. He did hit his head and has some bruises but denies any pain. Previous episode, as witnessed by sister was 2023, when she noticed head turn, screaming sound, whole body stiffening, was due to medication(lamotrigine) down titration, was sent home on current dose, has been compliant since. He has been following with Dr. Lopez. He is on Keppra 1g BID, Lamotrigine 100mg BID, Gabapentin 300mg BID. No recent med changes.    Interval hx:  Patient had a RRT called around 1500 1/17, for unresponsiveness, eyes rolling back and forth, concern for seizure, vital signs showed rectal temp of 102, , /106, lab work showed LA 5.6, he was started on abx and fluids. He had repeat RRT called around 1900 1/17, with him being unresponsive, right head turning, whole body stiffening and convulsive movements of all 4 limbs (as described by sister and RN), it lasted for about 3 minutes, resolved itself without ASM, but he was still confused, on evaluation, he was intermittently blanking out and starring but then able to answer questions, oriented x3, able to repeat sentences but appeared SOB, able to follow commands and move all extremities antigravity. VS /150, HR 96, spo2 89. Lab work showed WBC 13.82, HCO3 14, AG 24, LA 9, CK 1015, .     Subjective:  Patient seen and examined at bedside. Off sedation(precedex weaned off 10min ago). No acute distress.         -------------------------------------------------------------------------------------------------------------------------------------------------------------------------------------------------------------------------    Objective:    VITAL SIGNS:  Vital Signs Last 24 Hrs  T(C): 38.1 (17 Jan 2025 16:30), Max: 38.2 (17 Jan 2025 12:15)  T(F): 100.5 (17 Jan 2025 16:30), Max: 100.7 (17 Jan 2025 12:15)  HR: 82 (17 Jan 2025 16:47) (70 - 91)  BP: 129/71 (17 Jan 2025 16:47) (129/71 - 156/75)  BP(mean): --  RR: 18 (17 Jan 2025 16:47) (18 - 20)  SpO2: 94% (17 Jan 2025 16:47) (91% - 98%)    Parameters below as of 17 Jan 2025 16:47  Patient On (Oxygen Delivery Method): nasal cannula  O2 Flow (L/min): 2      PHYSICAL EXAMINATION:    General: Obese, appears in distress, SOB, bruises on face. Just weaned off precedex  Eyes: Conjunctiva and sclera clear.  Neck: supple, nontender, good ROM  Lungs: SOB  Neurologic:  - Mental Status:  Sleepy, no eye opening to pain, following simple commands, like pressing fingers, no verbal output  - Cranial Nerves II-XII:  BTT intact b/l, No nystagmus, no EOM, PERRLA 2mm b/l, V1-V3 intact, no facial asymmetry, t/p mild deviation to right, bruises on tongue,   - Motor:  no spontaneous movements, no withdrawal to pain in LE, some UE movements like squeezing fingers in RUE  - Reflexes:  1+ and symmetric at the biceps, triceps, brachioradialis, knees, and ankles.  Plantar responses mute.  - Sensory:  grimacing to pain  - Coordination:  unable to test due to acuity  - Gait:   unable to test due to acuity    --------------------------------------------------------------------------------------------------------------------------------------------------------------------------------------------------------------------------    LABS:                          14.4   13.82 )-----------( 120      ( 17 Jan 2025 19:13 )             43.8     01-17    139  |  101  |  32[H]  ----------------------------<  171[H]  4.7   |  14[L]  |  2.55[H]    Ca    9.2      17 Jan 2025 19:13  Phos  5.2     01-17  Mg     2.3     01-17    TPro  7.5  /  Alb  4.6  /  TBili  0.3  /  DBili  x   /  AST  75[H]  /  ALT  61[H]  /  AlkPhos  139[H]  01-17          RADIOLOGY & ADDITIONAL STUDIES:    MR Head No Cont:  (17 Jan 2025 13:59)    FINDINGS:    Study is a foreshortened and limited based on motion artifact.    No large territorial focus of diffusion restriction to suggest acute infarct. No hydrocephalus. Foci of increased FLAIR signal in the deep and periventricular white matter, compatible with chronic small vessel disease. Limited evaluation of the extra-axial spaces, however no significant midline shift.    The craniocervical junction is within normal limits to the extent visualized. The sella is not expanded. Limited evaluation of the paranasal sinuses, mastoid air cells, major intracranial flow voids and orbits.      IMPRESSION:    Study is a foreshortened and limited based on motion artifact.    1. No evidence of large territorial acute infarct or midline shift.  2. Chronic small vessel disease.    CT Head No Cont:  (16 Jan 2025 11:15)

## 2025-01-19 NOTE — EEG REPORT - NS EEG TEXT BOX
Patient Name: Tay Gr   Age: 69 year, : 1955  [place]: [address] [place] [address]    Study Start: 2025	08:00    Study End:  2025	13:06  Study Duration: 5 hr  6 min    -------------------------------------------------------------------------------------------------------  EEG Recording Technique:  The patient underwent continuous Video-EEG monitoring, using Telemetry System hardware on the XLTek Digital System. EEG and video data were stored on a computer hard drive with important events saved in digital archive files. The material was reviewed by a physician (electroencephalographer / epileptologist) on a daily basis. [place] and seizure detection algorithms were utilized and reviewed. An EEG Technician attended to the patient, and was available throughout daytime work hours.  The epilepsy center neurologist was available in person or on call 24-hours per day.    EEG Placement and Labeling of Electrodes:  The EEG was performed utilizing 20 channel referential EEG connections (coronal over temporal over parasagittal montage) using all standard 10-20 electrode placements with EKG, with additional electrodes placed in the inferior temporal region using the modified 10-10 montage electrode placements for elective admissions, or if deemed necessary. Recording was at a sampling rate of 256 samples per second per channel. Time synchronized digital video recording was done simultaneously with EEG recording. A low light infrared camera was used for low light recording.     -------------------------------------------------------------------------------------------------------  History: -  69 year old Male with seizure-like activity    Medication  Levophed    Artifical    Diprivan (Propofol)    Keppra (Levetiracetam)    Vimpat (Lacosamide)    Neurontin    Laxapro    Precedex    Lipitor      -------------------------------------------------------------------------------------------------------  Interpretation:    [[[Abbreviation Key:  PDR=alpha rhythm/posterior dominant rhythm. A-P=anterior posterior.  Amplitude: ‘very low’:<20; ‘low’:20-49; ‘medium’:; ‘high’:>150uV.  Persistence for periodic/rhythmic patterns (% of epoch) ‘rare’:<1%; ‘occasional’:1-10%; ‘frequent’:10-50%; ‘abundant’:50-90%; ‘continuous’:>90%.  Persistence for sporadic discharges: ‘rare’:<1/hr; ‘occasional’:1/min-1/hr; ‘frequent’:>1/min; ‘abundant’:>1/10 sec.  RPP=rhythmic and periodic patterns; GRDA=generalized rhythmic delta activity; FIRDA=frontal intermittent GRDA; LRDA=lateralized rhythmic delta activity; TIRDA=temporal intermittent rhythmic delta activity;  LPD=PLED=lateralized periodic discharges; GPD=generalized periodic discharges; BIPDs =bilateral independent periodic discharges; Mf=multifocal; SIRPDs=stimulus induced rhythmic, periodic, or ictal appearing discharges; BIRDs=brief potentially ictal rhythmic discharges >4 Hz, lasting .5-10s; PFA (paroxysmal bursts >13 Hz or =8 Hz <10s).  Modifiers: +F=with fast component; +S=with spike component; +R=with rhythmic component.  S-B=burst suppression pattern.  Max=maximal. N1-drowsy; N2-stage II sleep; N3-slow wave sleep. SSS/BETS=small sharp spikes/benign epileptiform transients of sleep. HV=hyperventilation; PS=photic stimulation]]]    FINDINGS:      Background:  SYMMETRY: symmetric  CONTINUOUS: nearly continuous  PDR: absent  REACTIVITY: present  VOLTAGE: normal, [defined typically between 20-150uV]  AP GRADIENT: absent  BREACH: absent  OTHER: none    Background Slowing:  GENERALIZED SLOWING: present. Background is diffusely slow consisting of polymorphic delta theta activity with superimposed fast frequency    FOCAL SLOWING: none was present.    State Changes:     Drowsiness and sleep did not occur    Sporadic Epileptiform Discharges:   None    Rhythmic and Periodic Patterns (RPPs):  None     Electrographic and Electroclinical seizures:  None    Other Clinical Events:  None    Activation Procedures:   Hyperventilation was not performed.      Photic stimulation was performed and did not elicit any abnormalities.        Artifacts:  Intermittent myogenic and movement artifacts were noted.    ECG:  The heart rate on single channel ECG was predominantly between 60-70 BPM.  -------------------------------------------------------------------------------------------------------  EEG Classification:    Abnormal EEG in lethargic state  1. Moderate diffuse background slowing    -------------------------------------------------------------------------------------------------------  EEG Impression / Clinical Correlate:    Abnormal prolonged EEG study due to Moderate diffuse cerebral dysfunction that is not specific in etiology or at least partly due to medications  No epileptic discharges recorded.  No seizures recorded.    -------------------------------------------------------------------------------------------------------  Rick Alonzo MD PhD  Director, Epilepsy Division, [facility]     ------------------------------------  EEG Reading Room: [phone]  On Call Service After Hours: [phone]

## 2025-01-20 LAB
ALBUMIN SERPL ELPH-MCNC: 3.4 G/DL — SIGNIFICANT CHANGE UP (ref 3.3–5)
ALP SERPL-CCNC: 122 U/L — HIGH (ref 40–120)
ALT FLD-CCNC: 54 U/L — HIGH (ref 10–45)
ANION GAP SERPL CALC-SCNC: 19 MMOL/L — HIGH (ref 5–17)
APTT BLD: 32 SEC — SIGNIFICANT CHANGE UP (ref 24.5–35.6)
AST SERPL-CCNC: 63 U/L — HIGH (ref 10–40)
BILIRUB SERPL-MCNC: 0.2 MG/DL — SIGNIFICANT CHANGE UP (ref 0.2–1.2)
BUN SERPL-MCNC: 39 MG/DL — HIGH (ref 7–23)
CALCIUM SERPL-MCNC: 9.3 MG/DL — SIGNIFICANT CHANGE UP (ref 8.4–10.5)
CHLORIDE SERPL-SCNC: 106 MMOL/L — SIGNIFICANT CHANGE UP (ref 96–108)
CO2 SERPL-SCNC: 15 MMOL/L — LOW (ref 22–31)
CREAT SERPL-MCNC: 2.49 MG/DL — HIGH (ref 0.5–1.3)
EGFR: 28 ML/MIN/1.73M2 — LOW
GAS PNL BLDA: SIGNIFICANT CHANGE UP
GLUCOSE BLDC GLUCOMTR-MCNC: 114 MG/DL — HIGH (ref 70–99)
GLUCOSE SERPL-MCNC: 113 MG/DL — HIGH (ref 70–99)
HCT VFR BLD CALC: 33 % — LOW (ref 39–50)
HGB BLD-MCNC: 10.9 G/DL — LOW (ref 13–17)
INR BLD: 0.92 RATIO — SIGNIFICANT CHANGE UP (ref 0.85–1.16)
LEVETIRACETAM SERPL-MCNC: 45.1 UG/ML — HIGH (ref 10–40)
MAGNESIUM SERPL-MCNC: 2.2 MG/DL — SIGNIFICANT CHANGE UP (ref 1.6–2.6)
MCHC RBC-ENTMCNC: 31.3 PG — SIGNIFICANT CHANGE UP (ref 27–34)
MCHC RBC-ENTMCNC: 33 G/DL — SIGNIFICANT CHANGE UP (ref 32–36)
MCV RBC AUTO: 94.8 FL — SIGNIFICANT CHANGE UP (ref 80–100)
NRBC # BLD: 0 /100 WBCS — SIGNIFICANT CHANGE UP (ref 0–0)
NRBC BLD-RTO: 0 /100 WBCS — SIGNIFICANT CHANGE UP (ref 0–0)
PHOSPHATE SERPL-MCNC: 4.4 MG/DL — SIGNIFICANT CHANGE UP (ref 2.5–4.5)
PLATELET # BLD AUTO: 104 K/UL — LOW (ref 150–400)
POTASSIUM SERPL-MCNC: 4.3 MMOL/L — SIGNIFICANT CHANGE UP (ref 3.5–5.3)
POTASSIUM SERPL-SCNC: 4.3 MMOL/L — SIGNIFICANT CHANGE UP (ref 3.5–5.3)
PROT SERPL-MCNC: 6.6 G/DL — SIGNIFICANT CHANGE UP (ref 6–8.3)
PROTHROM AB SERPL-ACNC: 10.6 SEC — SIGNIFICANT CHANGE UP (ref 9.9–13.4)
RBC # BLD: 3.48 M/UL — LOW (ref 4.2–5.8)
RBC # FLD: 12.8 % — SIGNIFICANT CHANGE UP (ref 10.3–14.5)
SODIUM SERPL-SCNC: 140 MMOL/L — SIGNIFICANT CHANGE UP (ref 135–145)
WBC # BLD: 10.49 K/UL — SIGNIFICANT CHANGE UP (ref 3.8–10.5)
WBC # FLD AUTO: 10.49 K/UL — SIGNIFICANT CHANGE UP (ref 3.8–10.5)

## 2025-01-20 PROCEDURE — 99233 SBSQ HOSP IP/OBS HIGH 50: CPT | Mod: GC

## 2025-01-20 RX ORDER — NYSTATIN 100000 U/G
1 POWDER TOPICAL THREE TIMES A DAY
Refills: 0 | Status: DISCONTINUED | OUTPATIENT
Start: 2025-01-20 | End: 2025-02-06

## 2025-01-20 RX ORDER — ACETYLCYSTEINE 200 MG/ML
4 VIAL (ML) MISCELLANEOUS EVERY 6 HOURS
Refills: 0 | Status: DISCONTINUED | OUTPATIENT
Start: 2025-01-20 | End: 2025-01-21

## 2025-01-20 RX ORDER — ACETAMINOPHEN 160 MG/5ML
1000 SUSPENSION ORAL ONCE
Refills: 0 | Status: COMPLETED | OUTPATIENT
Start: 2025-01-20 | End: 2025-01-20

## 2025-01-20 RX ORDER — ACETAMINOPHEN 160 MG/5ML
1000 SUSPENSION ORAL ONCE
Refills: 0 | Status: DISCONTINUED | OUTPATIENT
Start: 2025-01-20 | End: 2025-01-23

## 2025-01-20 RX ORDER — LIDOCAINE HYDROCHLORIDE 30 MG/G
1 CREAM TOPICAL DAILY
Refills: 0 | Status: DISCONTINUED | OUTPATIENT
Start: 2025-01-20 | End: 2025-02-06

## 2025-01-20 RX ADMIN — LACOSAMIDE 130 MILLIGRAM(S): 200 TABLET, FILM COATED ORAL at 05:39

## 2025-01-20 RX ADMIN — LAMOTRIGINE 100 MILLIGRAM(S): 100 TABLET ORAL at 17:01

## 2025-01-20 RX ADMIN — ESCITALOPRAM 15 MILLIGRAM(S): 10 TABLET, FILM COATED ORAL at 12:36

## 2025-01-20 RX ADMIN — LIDOCAINE HYDROCHLORIDE 1 PATCH: 30 CREAM TOPICAL at 07:45

## 2025-01-20 RX ADMIN — LEVETIRACETAM 400 MILLIGRAM(S): 750 TABLET, FILM COATED ORAL at 17:02

## 2025-01-20 RX ADMIN — Medication 4 MILLILITER(S): at 05:19

## 2025-01-20 RX ADMIN — NYSTATIN 1 APPLICATION(S): 100000 POWDER TOPICAL at 18:53

## 2025-01-20 RX ADMIN — GABAPENTIN 300 MILLIGRAM(S): 800 TABLET ORAL at 17:01

## 2025-01-20 RX ADMIN — Medication 4 MILLILITER(S): at 11:15

## 2025-01-20 RX ADMIN — ATORVASTATIN CALCIUM 20 MILLIGRAM(S): 80 TABLET, FILM COATED ORAL at 21:27

## 2025-01-20 RX ADMIN — LEVETIRACETAM 400 MILLIGRAM(S): 750 TABLET, FILM COATED ORAL at 05:21

## 2025-01-20 RX ADMIN — LAMOTRIGINE 100 MILLIGRAM(S): 100 TABLET ORAL at 05:41

## 2025-01-20 RX ADMIN — CEFTRIAXONE 100 MILLIGRAM(S): 250 INJECTION, POWDER, FOR SOLUTION INTRAMUSCULAR; INTRAVENOUS at 21:28

## 2025-01-20 RX ADMIN — OSELTAMIVIR PHOSPHATE 30 MILLIGRAM(S): 75 CAPSULE ORAL at 17:02

## 2025-01-20 RX ADMIN — Medication 4 MILLILITER(S): at 17:19

## 2025-01-20 RX ADMIN — ACETAMINOPHEN 400 MILLIGRAM(S): 160 SUSPENSION ORAL at 10:49

## 2025-01-20 RX ADMIN — IPRATROPIUM BROMIDE AND ALBUTEROL SULFATE 3 MILLILITER(S): .5; 2.5 SOLUTION RESPIRATORY (INHALATION) at 22:49

## 2025-01-20 RX ADMIN — IPRATROPIUM BROMIDE AND ALBUTEROL SULFATE 3 MILLILITER(S): .5; 2.5 SOLUTION RESPIRATORY (INHALATION) at 05:19

## 2025-01-20 RX ADMIN — Medication 4 MILLILITER(S): at 22:50

## 2025-01-20 RX ADMIN — ACETAMINOPHEN 1000 MILLIGRAM(S): 160 SUSPENSION ORAL at 20:41

## 2025-01-20 RX ADMIN — Medication 7500 UNIT(S): at 05:41

## 2025-01-20 RX ADMIN — LIDOCAINE HYDROCHLORIDE 1 PATCH: 30 CREAM TOPICAL at 01:57

## 2025-01-20 RX ADMIN — Medication 7500 UNIT(S): at 17:01

## 2025-01-20 RX ADMIN — ACETAMINOPHEN 400 MILLIGRAM(S): 160 SUSPENSION ORAL at 01:58

## 2025-01-20 RX ADMIN — LIDOCAINE HYDROCHLORIDE 1 PATCH: 30 CREAM TOPICAL at 14:00

## 2025-01-20 RX ADMIN — ACETAMINOPHEN 1000 MILLIGRAM(S): 160 SUSPENSION ORAL at 02:30

## 2025-01-20 RX ADMIN — IPRATROPIUM BROMIDE AND ALBUTEROL SULFATE 3 MILLILITER(S): .5; 2.5 SOLUTION RESPIRATORY (INHALATION) at 17:18

## 2025-01-20 RX ADMIN — ACETAMINOPHEN 1000 MILLIGRAM(S): 160 SUSPENSION ORAL at 11:30

## 2025-01-20 RX ADMIN — LACOSAMIDE 130 MILLIGRAM(S): 200 TABLET, FILM COATED ORAL at 18:05

## 2025-01-20 RX ADMIN — IPRATROPIUM BROMIDE AND ALBUTEROL SULFATE 3 MILLILITER(S): .5; 2.5 SOLUTION RESPIRATORY (INHALATION) at 11:15

## 2025-01-20 RX ADMIN — ANTISEPTIC SURGICAL SCRUB 1 APPLICATION(S): 0.04 SOLUTION TOPICAL at 05:38

## 2025-01-20 RX ADMIN — ACETAMINOPHEN 400 MILLIGRAM(S): 160 SUSPENSION ORAL at 20:26

## 2025-01-20 NOTE — PROGRESS NOTE ADULT - SUBJECTIVE AND OBJECTIVE BOX
CHIEF COMPLAINT: 69M w/ hx of metastatic testicular cancer (s/p L orchiectomy, on etoposide/cisplatin chemo, last dose 6/2024), epilepsy (grand-mal seizures), schizophrenia, developmental delay, HTN, HLD, VAZQUEZ, stage III CKD, obesity, secondary hyperparathyroidism, anemia, thrombocytopenia who was admitted on 1/16/25 for fall and syncope. On 1/17 patient had multiple RRTs called for grand mal seizure activity and was initially recommended sepsis workup and antipyretics given T103F, but patient had x2 more episode and was ultimately given ativan 2mg, vimpat load 200mg, and intubated for airway protection with MICU transfer.     Interval Events: Lamictal pill somewhat hard to crush. Extubated yesterday, but with some wheezing and belly breathing. Possible pulm edema and given bumex with BPs in 200s. Also given hydral 10mg with improvement. Mild fever, given tylenol and cultures sent. Per neuro: could d/c eeg, continue with vimpat, keppra and to consider MRI.     REVIEW OF SYSTEMS:    CONSTITUTIONAL:  No weakness, fevers or chills  EYES/ENT:  No visual changes;  No vertigo or throat pain   NECK:  No pain or stiffness  RESPIRATORY:  No cough, wheezing, hemoptysis; No shortness of breath  CARDIOVASCULAR:  No chest pain or palpitations  GASTROINTESTINAL:  No abdominal or epigastric pain. No nausea, vomiting, or hematemesis; No diarrhea or constipation. No melena or hematochezia.  GENITOURINARY:  No dysuria, frequency or hematuria  MUSCULOSKELETAL:  FROM all extremities, normal strength, No calf tenderness  NEUROLOGICAL:  No numbness or weakness  SKIN:  No itching, rashes  [ ] All other systems negative  [X] Unable to assess ROS because I/S    OBJECTIVE:  ICU Vital Signs Last 24 Hrs  T(C): 36.7 (20 Jan 2025 04:00), Max: 38.7 (19 Jan 2025 20:00)  T(F): 98 (20 Jan 2025 04:00), Max: 101.7 (19 Jan 2025 20:00)  HR: 111 (20 Jan 2025 07:00) (58 - 125)  BP: 128/72 (20 Jan 2025 07:00) (116/69 - 210/84)  BP(mean): 96 (20 Jan 2025 07:00) (84 - 124)  ABP: --  ABP(mean): --  RR: 20 (20 Jan 2025 07:00) (17 - 40)  SpO2: 93% (20 Jan 2025 07:00) (92% - 99%)    O2 Parameters below as of 20 Jan 2025 05:30  Patient On (Oxygen Delivery Method): nasal cannula            01-19 @ 07:01  -  01-20 @ 07:00  --------------------------------------------------------  IN: 1743 mL / OUT: 3715 mL / NET: -1972 mL      Mode: CPAP with PS, FiO2: 30, PEEP: 5, PS: 5, MAP: 10, PIP: 11  Drips  CAPILLARY BLOOD GLUCOSE      POCT Blood Glucose.: 147 mg/dL (19 Jan 2025 06:05)      PHYSICAL EXAM:  General: NAD, doing well.  HEENT: [X] Intubated. [X] OG in place. Brainstem reflexes in place  Lymph Nodes: No FREDDY palpated  Neck: trachea midline. no trach.   Respiratory: CTA b/l. No rales, rhonchi, wheezing appreciated.  Cardiovascular: RRR. +s1/s2, -s3/s4. No murmur, rubs, gallops. No edema  Abdomen: NT/ND. +BS, No HSM.   Extremities: 2+ pulses  Skin: [ ] edematous. No rashes, ecchymoses, or petechiae noted  Neurological: AAOx3. DTR 2+. strength 5/5 UE/LE bilaterally.   Psychiatry: Appropriate mood and affect.    LABS:  (01-20 @ 00:49)                        10.9  10.49 )-----------( 104                 33.0    Neutrophils = -- (--%)  Lymphocytes = -- (--%)  Eosinophils = -- (--%)  Basophils = -- (--%)  Monocytes = -- (--%)  Bands = --%    WBC Trend: 10.49<--, 9.51<--, 9.51<--  Hb Trend: 10.9<--, 10.4<--, 10.7<--, 12.0<--, 14.4<--  Plt Trend: 104<--, 76<--, 87<--, 114<--, 120<--  01-20    140  |  106  |  39[H]  ----------------------------<  113[H]  4.3   |  15[L]  |  2.49[H]    Ca    9.3      20 Jan 2025 00:50  Phos  4.4     01-20  Mg     2.2     01-20    TPro  6.6  /  Alb  3.4  /  TBili  0.2  /  DBili  x   /  AST  63[H]  /  ALT  54[H]  /  AlkPhos  122[H]  01-20    Creatinine Trend: 2.49<--, 2.57<--, 3.14<--, 3.25<--, 2.56<--, 2.55<--  PT/INR - ( 20 Jan 2025 00:49 )   PT: 10.6 sec;   INR: 0.92 ratio         PTT - ( 20 Jan 2025 00:49 )  PTT:32.0 sec  Urinalysis Basic - ( 20 Jan 2025 00:50 )    Color: x / Appearance: x / SG: x / pH: x  Gluc: 113 mg/dL / Ketone: x  / Bili: x / Urobili: x   Blood: x / Protein: x / Nitrite: x   Leuk Esterase: x / RBC: x / WBC x   Sq Epi: x / Non Sq Epi: x / Bacteria: x      Arterial Blood Gas:  01-20 @ 00:25  7.38/34/85/20/96.5/-4.0  ABG lactate: --  Arterial Blood Gas:  01-19 @ 19:26  7.38/30/85/18/96.9/-6.3  ABG lactate: --  Arterial Blood Gas:  01-19 @ 16:03  7.34/37/65/20/93.1/-5.3  ABG lactate: --  Arterial Blood Gas:  01-19 @ 12:28  7.34/37/106/20/97.8/-5.3  ABG lactate: --  Arterial Blood Gas:  01-18 @ 23:30  7.36/35/118/20/98.0/-5.0  ABG lactate: --  Arterial Blood Gas:  01-18 @ 15:08  7.36/34/88/19/97.2/-5.5  ABG lactate: --    MICROBIOLOGY:   Blood Cx:  Urine Cx:  Sputum Cx:  Legionella:  RVP:    HOSPITAL MEDICATIONS:  MEDICATIONS  (STANDING):  albuterol/ipratropium for Nebulization 3 milliLiter(s) Nebulizer every 6 hours  atorvastatin 20 milliGRAM(s) Oral at bedtime  cefTRIAXone   IVPB 1000 milliGRAM(s) IV Intermittent every 24 hours  chlorhexidine 2% Cloths 1 Application(s) Topical <User Schedule>  escitalopram 15 milliGRAM(s) Oral daily  gabapentin Solution 300 milliGRAM(s) Oral two times a day  heparin   Injectable 7500 Unit(s) SubCutaneous every 12 hours  influenza  Vaccine (HIGH DOSE) 0.5 milliLiter(s) IntraMuscular once  lacosamide IVPB 150 milliGRAM(s) IV Intermittent every 12 hours  lamoTRIgine 100 milliGRAM(s) Oral two times a day  levETIRAcetam  IVPB 1500 milliGRAM(s) IV Intermittent two times a day  lidocaine   4% Patch 1 Patch Transdermal daily  oseltamivir 30 milliGRAM(s) Oral two times a day  sodium chloride 3%  Inhalation 4 milliLiter(s) Inhalation every 6 hours    MEDICATIONS  (PRN):      RADIOLOGY:  X Ray:  CT:  MRI:  Ultrasound:  [ ] Reviewed and interpreted by me    EKG:   CHIEF COMPLAINT: 69M w/ hx of metastatic testicular cancer (s/p L orchiectomy, on etoposide/cisplatin chemo, last dose 6/2024), epilepsy (grand-mal seizures), schizophrenia, developmental delay, HTN, HLD, VAZQUEZ, stage III CKD, obesity, secondary hyperparathyroidism, anemia, thrombocytopenia who was admitted on 1/16/25 for fall and syncope. On 1/17 patient had multiple RRTs called for grand mal seizure activity and was initially recommended sepsis workup and antipyretics given T103F, but patient had x2 more episode and was ultimately given ativan 2mg, vimpat load 200mg, and intubated for airway protection with MICU transfer.     Interval Events: Lamictal pill somewhat hard to crush. Extubated yesterday, but with some wheezing and belly breathing yesterday. Possible pulm edema and given bumex with BPs in 200s. Also given hydral 10mg with improvement. Mild fever, given tylenol and cultures sent. Per neuro: could d/c eeg, continue with vimpat, keppra and to consider MRI. On exam, patient felt improved compared to yesterday, but still feeling congested and mildly wheezy. No pain or other concerns. On NC and felt comfortable.     REVIEW OF SYSTEMS:    CONSTITUTIONAL:  No weakness, fevers or chills  EYES/ENT:  No visual changes;  No vertigo or throat pain   NECK:  No pain or stiffness  RESPIRATORY:  No cough, wheezing, hemoptysis; No shortness of breath  CARDIOVASCULAR:  No chest pain or palpitations  GASTROINTESTINAL:  No abdominal or epigastric pain. No nausea, vomiting, or hematemesis; No diarrhea or constipation. No melena or hematochezia.  GENITOURINARY:  No dysuria, frequency or hematuria  MUSCULOSKELETAL:  FROM all extremities, normal strength, No calf tenderness  NEUROLOGICAL:  No numbness or weakness  SKIN:  No itching, rashes  [ ] All other systems negative  [X] Unable to assess ROS because I/S    OBJECTIVE:  ICU Vital Signs Last 24 Hrs  T(C): 36.7 (20 Jan 2025 04:00), Max: 38.7 (19 Jan 2025 20:00)  T(F): 98 (20 Jan 2025 04:00), Max: 101.7 (19 Jan 2025 20:00)  HR: 111 (20 Jan 2025 07:00) (58 - 125)  BP: 128/72 (20 Jan 2025 07:00) (116/69 - 210/84)  BP(mean): 96 (20 Jan 2025 07:00) (84 - 124)  ABP: --  ABP(mean): --  RR: 20 (20 Jan 2025 07:00) (17 - 40)  SpO2: 93% (20 Jan 2025 07:00) (92% - 99%)    O2 Parameters below as of 20 Jan 2025 05:30  Patient On (Oxygen Delivery Method): nasal cannula            01-19 @ 07:01  -  01-20 @ 07:00  --------------------------------------------------------  IN: 1743 mL / OUT: 3715 mL / NET: -1972 mL      Mode: CPAP with PS, FiO2: 30, PEEP: 5, PS: 5, MAP: 10, PIP: 11  Drips  CAPILLARY BLOOD GLUCOSE      POCT Blood Glucose.: 147 mg/dL (19 Jan 2025 06:05)      PHYSICAL EXAM:  General: NAD, doing well.  HEENT: [X] Intubated. [X] OG in place. Brainstem reflexes in place  Lymph Nodes: No FREDDY palpated  Neck: trachea midline. no trach.   Respiratory: CTA b/l. No rales, rhonchi, wheezing appreciated.  Cardiovascular: RRR. +s1/s2, -s3/s4. No murmur, rubs, gallops. No edema  Abdomen: NT/ND. +BS, No HSM.   Extremities: 2+ pulses  Skin: [ ] edematous. No rashes, ecchymoses, or petechiae noted  Neurological: AAOx3. DTR 2+. strength 5/5 UE/LE bilaterally.   Psychiatry: Appropriate mood and affect.    LABS:  (01-20 @ 00:49)                        10.9  10.49 )-----------( 104                 33.0    Neutrophils = -- (--%)  Lymphocytes = -- (--%)  Eosinophils = -- (--%)  Basophils = -- (--%)  Monocytes = -- (--%)  Bands = --%    WBC Trend: 10.49<--, 9.51<--, 9.51<--  Hb Trend: 10.9<--, 10.4<--, 10.7<--, 12.0<--, 14.4<--  Plt Trend: 104<--, 76<--, 87<--, 114<--, 120<--  01-20    140  |  106  |  39[H]  ----------------------------<  113[H]  4.3   |  15[L]  |  2.49[H]    Ca    9.3      20 Jan 2025 00:50  Phos  4.4     01-20  Mg     2.2     01-20    TPro  6.6  /  Alb  3.4  /  TBili  0.2  /  DBili  x   /  AST  63[H]  /  ALT  54[H]  /  AlkPhos  122[H]  01-20    Creatinine Trend: 2.49<--, 2.57<--, 3.14<--, 3.25<--, 2.56<--, 2.55<--  PT/INR - ( 20 Jan 2025 00:49 )   PT: 10.6 sec;   INR: 0.92 ratio         PTT - ( 20 Jan 2025 00:49 )  PTT:32.0 sec  Urinalysis Basic - ( 20 Jan 2025 00:50 )    Color: x / Appearance: x / SG: x / pH: x  Gluc: 113 mg/dL / Ketone: x  / Bili: x / Urobili: x   Blood: x / Protein: x / Nitrite: x   Leuk Esterase: x / RBC: x / WBC x   Sq Epi: x / Non Sq Epi: x / Bacteria: x      Arterial Blood Gas:  01-20 @ 00:25  7.38/34/85/20/96.5/-4.0  ABG lactate: --  Arterial Blood Gas:  01-19 @ 19:26  7.38/30/85/18/96.9/-6.3  ABG lactate: --  Arterial Blood Gas:  01-19 @ 16:03  7.34/37/65/20/93.1/-5.3  ABG lactate: --  Arterial Blood Gas:  01-19 @ 12:28  7.34/37/106/20/97.8/-5.3  ABG lactate: --  Arterial Blood Gas:  01-18 @ 23:30  7.36/35/118/20/98.0/-5.0  ABG lactate: --  Arterial Blood Gas:  01-18 @ 15:08  7.36/34/88/19/97.2/-5.5  ABG lactate: --    MICROBIOLOGY:   Blood Cx:  Urine Cx:  Sputum Cx:  Legionella:  RVP:    HOSPITAL MEDICATIONS:  MEDICATIONS  (STANDING):  albuterol/ipratropium for Nebulization 3 milliLiter(s) Nebulizer every 6 hours  atorvastatin 20 milliGRAM(s) Oral at bedtime  cefTRIAXone   IVPB 1000 milliGRAM(s) IV Intermittent every 24 hours  chlorhexidine 2% Cloths 1 Application(s) Topical <User Schedule>  escitalopram 15 milliGRAM(s) Oral daily  gabapentin Solution 300 milliGRAM(s) Oral two times a day  heparin   Injectable 7500 Unit(s) SubCutaneous every 12 hours  influenza  Vaccine (HIGH DOSE) 0.5 milliLiter(s) IntraMuscular once  lacosamide IVPB 150 milliGRAM(s) IV Intermittent every 12 hours  lamoTRIgine 100 milliGRAM(s) Oral two times a day  levETIRAcetam  IVPB 1500 milliGRAM(s) IV Intermittent two times a day  lidocaine   4% Patch 1 Patch Transdermal daily  oseltamivir 30 milliGRAM(s) Oral two times a day  sodium chloride 3%  Inhalation 4 milliLiter(s) Inhalation every 6 hours    MEDICATIONS  (PRN):      RADIOLOGY:  X Ray:  CT:  MRI:  Ultrasound:  [ ] Reviewed and interpreted by me    EKG:   CHIEF COMPLAINT: 69M w/ hx of metastatic testicular cancer (s/p L orchiectomy, on etoposide/cisplatin chemo, last dose 6/2024), epilepsy (grand-mal seizures), schizophrenia, developmental delay, HTN, HLD, VAZQUEZ, stage III CKD, obesity, secondary hyperparathyroidism, anemia, thrombocytopenia who was admitted on 1/16/25 for fall and syncope. On 1/17 patient had multiple RRTs called for grand mal seizure activity and was initially recommended sepsis workup and antipyretics given T103F, but patient had x2 more episode and was ultimately given ativan 2mg, vimpat load 200mg, and intubated for airway protection with MICU transfer.     Interval Events: Lamictal pill somewhat hard to crush. Extubated yesterday, but with some wheezing and belly breathing yesterday. Possible pulm edema and given bumex with BPs in 200s. Also given hydral 10mg with improvement. Mild fever, given tylenol and cultures sent. Per neuro: could d/c eeg, continue with vimpat, keppra and to consider MRI. On exam, patient felt improved compared to yesterday, but still feeling congested and mildly wheezy. No pain or other concerns. On NC and felt comfortable.     REVIEW OF SYSTEMS: Negative except as above.     OBJECTIVE:  ICU Vital Signs Last 24 Hrs  T(C): 36.7 (20 Jan 2025 04:00), Max: 38.7 (19 Jan 2025 20:00)  T(F): 98 (20 Jan 2025 04:00), Max: 101.7 (19 Jan 2025 20:00)  HR: 111 (20 Jan 2025 07:00) (58 - 125)  BP: 128/72 (20 Jan 2025 07:00) (116/69 - 210/84)  BP(mean): 96 (20 Jan 2025 07:00) (84 - 124)  ABP: --  ABP(mean): --  RR: 20 (20 Jan 2025 07:00) (17 - 40)  SpO2: 93% (20 Jan 2025 07:00) (92% - 99%)    O2 Parameters below as of 20 Jan 2025 05:30  Patient On (Oxygen Delivery Method): nasal cannula    PHYSICAL EXAM  General: Nontoxic, no distress, watching TV   HEENT: Normocephalic. Normal icterus. R forehead bruise. MMM.  Heart: Regular rate, rhythm. Normal S1/S2. No murmurs. Distant sounds.   Lungs: Wheezing/coarse throughout, aeration throughout, no distress  GI: Soft, obese, nontender  Neuro: Alert, oriented. No obvious focal deficits.   Ext: Peripheral pulses intact. No lower extremity edema. R foot warmer than L foot as prior.   Skin: Warm.     01-19 @ 07:01  -  01-20 @ 07:00  --------------------------------------------------------  IN: 1743 mL / OUT: 3715 mL / NET: -1972 mL    Mode: CPAP with PS, FiO2: 30, PEEP: 5, PS: 5, MAP: 10, PIP: 11  Drips  CAPILLARY BLOOD GLUCOSE    POCT Blood Glucose.: 147 mg/dL (19 Jan 2025 06:05)    LABS:  (01-20 @ 00:49)                        10.9  10.49 )-----------( 104                 33.0    Neutrophils = -- (--%)  Lymphocytes = -- (--%)  Eosinophils = -- (--%)  Basophils = -- (--%)  Monocytes = -- (--%)  Bands = --%    WBC Trend: 10.49<--, 9.51<--, 9.51<--  Hb Trend: 10.9<--, 10.4<--, 10.7<--, 12.0<--, 14.4<--  Plt Trend: 104<--, 76<--, 87<--, 114<--, 120<--  01-20    140  |  106  |  39[H]  ----------------------------<  113[H]  4.3   |  15[L]  |  2.49[H]    Ca    9.3      20 Jan 2025 00:50  Phos  4.4     01-20  Mg     2.2     01-20    TPro  6.6  /  Alb  3.4  /  TBili  0.2  /  DBili  x   /  AST  63[H]  /  ALT  54[H]  /  AlkPhos  122[H]  01-20    Creatinine Trend: 2.49<--, 2.57<--, 3.14<--, 3.25<--, 2.56<--, 2.55<--  PT/INR - ( 20 Jan 2025 00:49 )   PT: 10.6 sec;   INR: 0.92 ratio      PTT - ( 20 Jan 2025 00:49 )  PTT:32.0 sec  Urinalysis Basic - ( 20 Jan 2025 00:50 )    Color: x / Appearance: x / SG: x / pH: x  Gluc: 113 mg/dL / Ketone: x  / Bili: x / Urobili: x   Blood: x / Protein: x / Nitrite: x   Leuk Esterase: x / RBC: x / WBC x   Sq Epi: x / Non Sq Epi: x / Bacteria: x    Arterial Blood Gas:  01-20 @ 00:25  7.38/34/85/20/96.5/-4.0  ABG lactate: --  Arterial Blood Gas:  01-19 @ 19:26  7.38/30/85/18/96.9/-6.3  ABG lactate: --  Arterial Blood Gas:  01-19 @ 16:03  7.34/37/65/20/93.1/-5.3  ABG lactate: --  Arterial Blood Gas:  01-19 @ 12:28  7.34/37/106/20/97.8/-5.3  ABG lactate: --  Arterial Blood Gas:  01-18 @ 23:30  7.36/35/118/20/98.0/-5.0  ABG lactate: --  Arterial Blood Gas:  01-18 @ 15:08  7.36/34/88/19/97.2/-5.5  ABG lactate: --    MICROBIOLOGY:   Blood Cx:  Urine Cx:  Sputum Cx:  Legionella:  RVP:    HOSPITAL MEDICATIONS:  MEDICATIONS  (STANDING):  albuterol/ipratropium for Nebulization 3 milliLiter(s) Nebulizer every 6 hours  atorvastatin 20 milliGRAM(s) Oral at bedtime  cefTRIAXone   IVPB 1000 milliGRAM(s) IV Intermittent every 24 hours  chlorhexidine 2% Cloths 1 Application(s) Topical <User Schedule>  escitalopram 15 milliGRAM(s) Oral daily  gabapentin Solution 300 milliGRAM(s) Oral two times a day  heparin   Injectable 7500 Unit(s) SubCutaneous every 12 hours  influenza  Vaccine (HIGH DOSE) 0.5 milliLiter(s) IntraMuscular once  lacosamide IVPB 150 milliGRAM(s) IV Intermittent every 12 hours  lamoTRIgine 100 milliGRAM(s) Oral two times a day  levETIRAcetam  IVPB 1500 milliGRAM(s) IV Intermittent two times a day  lidocaine   4% Patch 1 Patch Transdermal daily  oseltamivir 30 milliGRAM(s) Oral two times a day  sodium chloride 3%  Inhalation 4 milliLiter(s) Inhalation every 6 hours    MEDICATIONS  (PRN):    RADIOLOGY:  X Ray:  CT:  MRI:  Ultrasound:  [ ] Reviewed and interpreted by me    EKG:   CHIEF COMPLAINT: 69M w/ hx of metastatic testicular cancer (s/p L orchiectomy, on etoposide/cisplatin chemo, last dose 6/2024), epilepsy (grand-mal seizures), schizophrenia, developmental delay, HTN, HLD, VAZQUEZ, stage III CKD, obesity, secondary hyperparathyroidism, anemia, thrombocytopenia who was admitted on 1/16/25 for fall and syncope. On 1/17 patient had multiple RRTs called for grand mal seizure activity and was initially recommended sepsis workup and antipyretics given T103F, but patient had x2 more episode and was ultimately given ativan 2mg, vimpat load 200mg, and intubated for airway protection with MICU transfer.     Interval Events: Lamictal pill somewhat hard to crush, oral meds given with small sips, will need SLP today. Extubated yesterday, but with some wheezing and belly breathing yesterday. Possible flash pulm edema and given bumex with BPs in 200s. Also given hydral 10mg with improvement. Mild fever, given tylenol and cultures sent. Per neuro: could d/c eeg, continue with vimpat, keppra and to consider MRI for new foci. On exam, patient felt improved compared to yesterday, but still feeling congested and mildly wheezy. No pain or other concerns. On NC and felt comfortable. Per nursing, secretions are requiring deep suctioning q3h, so not ready for floor.     REVIEW OF SYSTEMS: Negative except as above.     OBJECTIVE:  ICU Vital Signs Last 24 Hrs  T(C): 36.7 (20 Jan 2025 04:00), Max: 38.7 (19 Jan 2025 20:00)  T(F): 98 (20 Jan 2025 04:00), Max: 101.7 (19 Jan 2025 20:00)  HR: 111 (20 Jan 2025 07:00) (58 - 125)  BP: 128/72 (20 Jan 2025 07:00) (116/69 - 210/84)  BP(mean): 96 (20 Jan 2025 07:00) (84 - 124)  ABP: --  ABP(mean): --  RR: 20 (20 Jan 2025 07:00) (17 - 40)  SpO2: 93% (20 Jan 2025 07:00) (92% - 99%)    O2 Parameters below as of 20 Jan 2025 05:30  Patient On (Oxygen Delivery Method): nasal cannula    PHYSICAL EXAM  General: Nontoxic, no distress, watching TV   HEENT: Normocephalic. Normal icterus. R forehead bruise. MMM.  Heart: Regular rate, rhythm. Normal S1/S2. No murmurs. Distant sounds.   Lungs: Wheezing/coarse throughout, aeration throughout, no distress  GI: Soft, obese, nontender  Neuro: Alert, oriented. No obvious focal deficits.   Ext: Peripheral pulses intact. No lower extremity edema. R foot warmer than L foot as prior.   Skin: Warm.     01-19 @ 07:01  -  01-20 @ 07:00  --------------------------------------------------------  IN: 1743 mL / OUT: 3715 mL / NET: -1972 mL    Mode: CPAP with PS, FiO2: 30, PEEP: 5, PS: 5, MAP: 10, PIP: 11  Drips  CAPILLARY BLOOD GLUCOSE    POCT Blood Glucose.: 147 mg/dL (19 Jan 2025 06:05)    LABS:  (01-20 @ 00:49)                        10.9  10.49 )-----------( 104                 33.0    Neutrophils = -- (--%)  Lymphocytes = -- (--%)  Eosinophils = -- (--%)  Basophils = -- (--%)  Monocytes = -- (--%)  Bands = --%    WBC Trend: 10.49<--, 9.51<--, 9.51<--  Hb Trend: 10.9<--, 10.4<--, 10.7<--, 12.0<--, 14.4<--  Plt Trend: 104<--, 76<--, 87<--, 114<--, 120<--  01-20    140  |  106  |  39[H]  ----------------------------<  113[H]  4.3   |  15[L]  |  2.49[H]    Ca    9.3      20 Jan 2025 00:50  Phos  4.4     01-20  Mg     2.2     01-20    TPro  6.6  /  Alb  3.4  /  TBili  0.2  /  DBili  x   /  AST  63[H]  /  ALT  54[H]  /  AlkPhos  122[H]  01-20    Creatinine Trend: 2.49<--, 2.57<--, 3.14<--, 3.25<--, 2.56<--, 2.55<--  PT/INR - ( 20 Jan 2025 00:49 )   PT: 10.6 sec;   INR: 0.92 ratio      PTT - ( 20 Jan 2025 00:49 )  PTT:32.0 sec  Urinalysis Basic - ( 20 Jan 2025 00:50 )    Color: x / Appearance: x / SG: x / pH: x  Gluc: 113 mg/dL / Ketone: x  / Bili: x / Urobili: x   Blood: x / Protein: x / Nitrite: x   Leuk Esterase: x / RBC: x / WBC x   Sq Epi: x / Non Sq Epi: x / Bacteria: x    Arterial Blood Gas:  01-20 @ 00:25  7.38/34/85/20/96.5/-4.0  ABG lactate: --  Arterial Blood Gas:  01-19 @ 19:26  7.38/30/85/18/96.9/-6.3  ABG lactate: --  Arterial Blood Gas:  01-19 @ 16:03  7.34/37/65/20/93.1/-5.3  ABG lactate: --  Arterial Blood Gas:  01-19 @ 12:28  7.34/37/106/20/97.8/-5.3  ABG lactate: --  Arterial Blood Gas:  01-18 @ 23:30  7.36/35/118/20/98.0/-5.0  ABG lactate: --  Arterial Blood Gas:  01-18 @ 15:08  7.36/34/88/19/97.2/-5.5  ABG lactate: --    MICROBIOLOGY:   Blood Cx:  Urine Cx:  Sputum Cx:  Legionella:  RVP:    HOSPITAL MEDICATIONS:  MEDICATIONS  (STANDING):  albuterol/ipratropium for Nebulization 3 milliLiter(s) Nebulizer every 6 hours  atorvastatin 20 milliGRAM(s) Oral at bedtime  cefTRIAXone   IVPB 1000 milliGRAM(s) IV Intermittent every 24 hours  chlorhexidine 2% Cloths 1 Application(s) Topical <User Schedule>  escitalopram 15 milliGRAM(s) Oral daily  gabapentin Solution 300 milliGRAM(s) Oral two times a day  heparin   Injectable 7500 Unit(s) SubCutaneous every 12 hours  influenza  Vaccine (HIGH DOSE) 0.5 milliLiter(s) IntraMuscular once  lacosamide IVPB 150 milliGRAM(s) IV Intermittent every 12 hours  lamoTRIgine 100 milliGRAM(s) Oral two times a day  levETIRAcetam  IVPB 1500 milliGRAM(s) IV Intermittent two times a day  lidocaine   4% Patch 1 Patch Transdermal daily  oseltamivir 30 milliGRAM(s) Oral two times a day  sodium chloride 3%  Inhalation 4 milliLiter(s) Inhalation every 6 hours    MEDICATIONS  (PRN):    RADIOLOGY:  X Ray:  CT:  MRI:  Ultrasound:  [ ] Reviewed and interpreted by me    EKG:

## 2025-01-20 NOTE — PROGRESS NOTE ADULT - SUBJECTIVE AND OBJECTIVE BOX
[facility]  JACINTO Horton Y. Patel, S. Shah, G. Tao  [phone]  ([phone] - weekdays after 5pm and weekends)    Name: OSCAR MILAN  Age/Gender: 69y Male  MRN: [id number]    Interval History:  Patient seen and examined this morning.   Patient extubated yesterday.   Feels congested, feels breathing ok.   Denies chest pain, abd pain, nausea, diarrhea.  Notes reviewed. Afebrile   Allergies: penicillin (Hives)  seasonal allergies (Unknown)      Objective:  Vitals:   T(F): 99.5 (01-20-25 @ 08:00), Max: 101.7 (01-19-25 @ 20:00)  HR: 99 (01-20-25 @ 11:12) (61 - 125)  BP: 148/82 (01-20-25 @ 11:00) (116/69 - 210/84)  RR: 21 (01-20-25 @ 11:00) (17 - 40)  SpO2: 99% (01-20-25 @ 11:12) (92% - 99%)  Physical Examination:  General: no acute distress, NC, obese   HEENT: NC/AT, anicteric, EOMI  Respiratory: coarse breath sounds   Cardiovascular: S1 and S2 present, normal rate   Gastrointestinal: nontender, nondistended  Extremities: no edema, no cyanosis  Skin: no visible rash, abrasion R brow area    Laboratory Studies:  CBC:                       10.9   10.49 )-----------( 104      ( 20 Jan 2025 00:49 )             33.0     WBC Trend:  10.49 01-20-25 @ 00:49  9.51 01-18-25 @ 23:35  9.51 01-18-25 @ 15:08  17.77 01-17-25 @ 22:20  13.82 01-17-25 @ 19:13  6.72 01-17-25 @ 07:02  8.38 01-16-25 @ 07:30    CMP: 01-20    140  |  106  |  39[H]  ----------------------------<  113[H]  4.3   |  15[L]  |  2.49[H]    Ca    9.3      20 Jan 2025 00:50  Phos  4.4     01-20  Mg     2.2     01-20    TPro  6.6  /  Alb  3.4  /  TBili  0.2  /  DBili  x   /  AST  63[H]  /  ALT  54[H]  /  AlkPhos  122[H]  01-20    Creatinine: 2.49 mg/dL (01-20-25 @ 00:50)  Creatinine: 2.57 mg/dL (01-19-25 @ 19:32)  Creatinine: 3.14 mg/dL (01-18-25 @ 23:35)  Creatinine: 3.25 mg/dL (01-18-25 @ 15:08)  Creatinine: 2.56 mg/dL (01-17-25 @ 22:46)  Creatinine: 2.55 mg/dL (01-17-25 @ 19:13)  Creatinine: 2.27 mg/dL (01-17-25 @ 12:49)  Creatinine: 2.79 mg/dL (01-16-25 @ 07:30)    LIVER FUNCTIONS - ( 20 Jan 2025 00:50 )  Alb: 3.4 g/dL / Pro: 6.6 g/dL / ALK PHOS: 122 U/L / ALT: 54 U/L / AST: 63 U/L / GGT: x           Microbiology: reviewed   Culture - Sputum (collected 01-18-25 @ 00:36)  Source: .Sputum Sputum  Gram Stain (01-18-25 @ 06:52):    Moderate polymorphonuclear leukocytes per low power field    No Squamous epithelial cells per low power field    No organisms seen per oil power field  Final Report (01-19-25 @ 08:17):    No growth    Culture - Blood (collected 01-17-25 @ 15:10)  Source: .Blood BLOOD  Preliminary Report (01-19-25 @ 20:00):    No growth at 48 Hours    Culture - Blood (collected 01-17-25 @ 14:51)  Source: .Blood BLOOD  Preliminary Report (01-19-25 @ 20:00):    No growth at 48 Hours    SARS-CoV-2 Result: NotDetec (16 Jan 2025 08:24)    Radiology: reviewed   < from: Xray Chest 1 View- PORTABLE-Urgent (Xray Chest 1 View- PORTABLE-Urgent .) (01.17.25 @ 21:56) >  FINDINGS:    Single frontal view of the chest demonstrates status post open heart   surgery. NG tube tip in the region of the gastric fundus. Remaining   support devices are in satisfactory position. Low lung volumes. Thoracic   spinal fusion hardware. The cardiomediastinal silhouette is enlarged.   Mild congestive changes. No acute osseous abnormalities. Overlying EKG   leads and wires are noted    IMPRESSION:. NG tube tip in the region of the gastric fundus.    --- End of Report ---    < end of copied text >  < from: CT Chest No Cont (01.17.25 @ 20:09) >  IMPRESSION:    Anterior bowing of the posterior tracheal wall, suggestive of   tracheomalacia.    Trace right pleural effusion.    --- End of Report ---    < end of copied text >    Medications:  acetaminophen   IVPB .. 1000 milliGRAM(s) IV Intermittent once PRN  albuterol/ipratropium for Nebulization 3 milliLiter(s) Nebulizer every 6 hours  atorvastatin 20 milliGRAM(s) Oral at bedtime  cefTRIAXone   IVPB 1000 milliGRAM(s) IV Intermittent every 24 hours  chlorhexidine 2% Cloths 1 Application(s) Topical <User Schedule>  escitalopram 15 milliGRAM(s) Oral daily  gabapentin Solution 300 milliGRAM(s) Oral two times a day  heparin   Injectable 7500 Unit(s) SubCutaneous every 12 hours  influenza  Vaccine (HIGH DOSE) 0.5 milliLiter(s) IntraMuscular once  lacosamide IVPB 150 milliGRAM(s) IV Intermittent every 12 hours  lamoTRIgine 100 milliGRAM(s) Oral two times a day  levETIRAcetam  IVPB 1500 milliGRAM(s) IV Intermittent two times a day  lidocaine   4% Patch 1 Patch Transdermal daily  oseltamivir 30 milliGRAM(s) Oral two times a day  sodium chloride 3%  Inhalation 4 milliLiter(s) Inhalation every 6 hours    Current Antimicrobials:  cefTRIAXone   IVPB 1000 milliGRAM(s) IV Intermittent every 24 hours  oseltamivir 30 milliGRAM(s) Oral two times a day    Prior/Completed Antimicrobials:  cefTRIAXone   IVPB  cefTRIAXone   IVPB  vancomycin  IVPB

## 2025-01-20 NOTE — PROGRESS NOTE ADULT - ASSESSMENT
Patient is a 69 year old male with PMH of metastatic testicular cancer (s/p L orchiectomy, on etoposide/cisplatin chemo, last dose 6/2024), epilepsy (grand-mal seizures), schizophrenia, developmental delay, HTN, HLD, VAZQUEZ, stage III CKD, severe obesity, secondary hyperparathyroidism, anemia, thrombocytopenia who presented to ED for fall, possible seizure prior.   Admitted for further work up for syncope, c/f seizure   Influenza A  - tested FluA positive on admission  - afebrile, WBC wnl, saturating well on RA  - reports cough for few weeks, no other resp sx  - CXR with clear lungs, no pneumonia   1/17 s/p RRTs for grand mal seizure activity, s/p intubation and transfer to MICU  - 1/17 CT chest with anterior bowing of posterior tracheal wall suggestive of tracheomalacia, trace R pleural effusion   - started on ceftriaxone for possible aspiration, started on tamiflu 1/18   - MRSA PCR screen negative    - sputum culture negative    - Bcx NGTD x2    - now s/p extubation 1/19    Recommendations:   Continue ceftriaxone to complete 5d course on 1/21  Continue tamiflu to complete 5d on 1/22   Supportive care  Isolation per IC protocol  Neurology following  Cardiology following   Continue rest of care per primary team       Greyson Mart M.D.  [facility]  Available on [employer] TEAMS - *PREFERRED*  [phone]  After 5pm on weekdays and all day on weekends - please call [phone]     Thank you for consulting us and involving us in the management of this patients case. In addition to reviewing history, imaging, documents, labs, microbiology, took into account antibiotic stewardship, local antibiogram and infection control strategies and potential transmission issues.

## 2025-01-20 NOTE — PROGRESS NOTE ADULT - ASSESSMENT
ASSESSMENT & PLAN  69M w/ hx of metastatic testicular cancer (s/p L orchiectomy, on etoposide/cisplatin chemo, last dose 6/2024), epilepsy (grand-mal seizures), schizophrenia, developmental delay, HTN, HLD, VAZQUEZ, stage III CKD, obesity, secondary hyperparathyroidism, anemia, thrombocytopenia who was admitted on 1/16/25 for fall and syncope. On 1/17 patient had multiple RRTs called for grand mal seizure activity and was initially recommended sepsis workup and antipyretics given T103F, but patient had x2 more episode and was ultimately given ativan 2mg, vimpat load 200mg, and intubated for airway protection with MICU transfer.     CHANGES TODAY  ***    - EEG no seizures recorded  - Extubation to BIPAP today   - Off Levo, MAP >65    ===NEURO===  #Status Epilepticus  #Grand Mal Seizures  - Sedation: Precedex, Ativan  - AEDs: Keppra 1.5g BID, Lamotrigine 100mg BID, Gabapentin 300mg BID, Vimpat 150mg BID  - EEG negative for seizures  - Neuro following, appreciate recs    #Schizophrenia  #Depression  - Lurasidone 40mg daily, ok per neuro  - Escitalopram 15mg daily    ===CVS===  #HTN #HLD   - Atorvastatin 20mg qhs    #Hypotension w/ propofol use  Off of Levo, MAP >65     #Widened Mediastinum  #Difference in BP in Extremities  #PAD w/ R foot cooler than L  Noticed during RRT: SBP 200s in the R arm and 140s in the L arm, which persisted on subsequent readings. On repeat in MICU, patient with 95/57 on RA and 110/63 on LA. Limited by body habitus, positioning, IV lines in upper extremities, PAD. CXR showed widened mediastinum. CT chest noncon on 1/17 did not comment. Patient is hemodynamically stable, no increasing lactic acidosis, and has no other signs for dissection, but with pulse pressure variation and widened mediastinum in the setting of initial complaint of syncope, will continue to monitor.   - CTM, if having true concern for dissection, would perform CTA despite CKD III and maintain T&S q72    ===PULM===  #Airway precautions  #VAZQUEZ  - Wean down FIO2 to minimum  - Titrate vent settings based on SpO2 and blood gas  - Head elevation to 15-30 degrees    ===RENAL===  #Metabolic acidosis  #Lactic acidosis  #CKD Stage III  #Electrolytes  - LR at 100cc/hr  - Trending labs, monitor electrolytes, I/O    #At risk of rhabdomyolysis  - CK uptrended from the day prior, from 745 to 793    ******GASTROINTESTINAL************  #Diet,NPO #OG  - C/w tube feeds     ******INFECTIOUS DISEASE************  #Aspiration PNA  #Flu A positive  - Ceftriaxone 1g daily (1/17-1/22)  - Tamiflu 30mg BID (1/18-1/22)  - Follow cultures: BCx, Bronch    ******ENDOCRINE************  #At risk of hypoglycemia  #Hx of secondary hyperparathyroidism  - FS q6h while NPO  - Follow thyroid studies, pending    ******HEME/ONC************  #Metastatic testicular cancer, recent chemo 06/2024  #DVT ppx  - subQ heparin    ===ETHICS===  #Code Status: FULL CODE    Signed,  Brittani Virgen  (PGY-1 Emergency Medicine) ASSESSMENT & PLAN  69M w/ hx of metastatic testicular cancer (s/p L orchiectomy, on etoposide/cisplatin chemo, last dose 6/2024), epilepsy (grand-mal seizures), schizophrenia, developmental delay, HTN, HLD, VAZQUEZ, stage III CKD, obesity, secondary hyperparathyroidism, anemia, thrombocytopenia who was admitted on 1/16/25 for fall and syncope. On 1/17 patient had multiple RRTs called for grand mal seizure activity and was initially recommended sepsis workup and antipyretics given T103F, but patient had x2 more episode and was ultimately given ativan 2mg, vimpat load 200mg, and intubated for airway protection with MICU transfer.     CHANGES TODAY  -Bed board today, stable resp on NC, no seizures per eeg  - ***        ===NEURO===  #Status Epilepticus  #Grand Mal Seizures  - Sedation: Precedex, Ativan  - AEDs: Keppra 1.5g BID, Lamotrigine 100mg BID, Gabapentin 300mg BID, Vimpat 150mg BID  - EEG negative for seizures  - Neuro following, appreciate recs    #Schizophrenia  #Depression  - Lurasidone 40mg daily, ok per neuro  - Escitalopram 15mg daily    ===CVS===  #HTN #HLD   - Atorvastatin 20mg qhs    #Hypotension w/ propofol use  Off of Levo, MAP >65     #Widened Mediastinum  #Difference in BP in Extremities  #PAD w/ R foot cooler than L  Noticed during RRT: SBP 200s in the R arm and 140s in the L arm, which persisted on subsequent readings. On repeat in MICU, patient with 95/57 on RA and 110/63 on LA. Limited by body habitus, positioning, IV lines in upper extremities, PAD. CXR showed widened mediastinum. CT chest noncon on 1/17 did not comment. Patient is hemodynamically stable, no increasing lactic acidosis, and has no other signs for dissection, but with pulse pressure variation and widened mediastinum in the setting of initial complaint of syncope, will continue to monitor.   - CTM, if having true concern for dissection, would perform CTA despite CKD III and maintain T&S q72    ===PULM===  #Airway precautions  #VAZQUEZ  - Wean down FIO2 to minimum  - Titrate vent settings based on SpO2 and blood gas  - Head elevation to 15-30 degrees    ===RENAL===  #Metabolic acidosis  #Lactic acidosis  #CKD Stage III  #Electrolytes  - LR at 100cc/hr  - Trending labs, monitor electrolytes, I/O    #At risk of rhabdomyolysis  - CK uptrended from the day prior, from 745 to 793    ******GASTROINTESTINAL************  #Diet,NPO #OG  - C/w tube feeds     ******INFECTIOUS DISEASE************  #Aspiration PNA  #Flu A positive  - Ceftriaxone 1g daily (1/17-1/22)  - Tamiflu 30mg BID (1/18-1/22)  - Follow cultures: BCx, Bronch    ******ENDOCRINE************  #At risk of hypoglycemia  #Hx of secondary hyperparathyroidism  - FS q6h while NPO  - Follow thyroid studies, pending    ******HEME/ONC************  #Metastatic testicular cancer, recent chemo 06/2024  #DVT ppx  - subQ heparin    ===ETHICS===  #Code Status: FULL CODE    Signed,  Brittani Virgen  (PGY-1 Emergency Medicine) ASSESSMENT & PLAN  69M w/ hx of metastatic testicular cancer (s/p L orchiectomy, on etoposide/cisplatin chemo, last dose 6/2024), epilepsy (grand-mal seizures), schizophrenia, developmental delay, HTN, HLD, VAZQUEZ, stage III CKD, obesity, secondary hyperparathyroidism, anemia, thrombocytopenia who was admitted on 1/16/25 for fall and syncope. On 1/17 patient had multiple RRTs called for grand mal seizure activity and was initially recommended sepsis workup and antipyretics given T103F, but patient had x2 more episode and was ultimately given ativan 2mg, vimpat load 200mg, and intubated for airway protection with MICU transfer.     CHANGES TODAY  -Bed board today, stable resp on NC, no seizures per eeg  -MRI Brain w/wo contrast per neuro to investigate for new foci  ***    ===NEURO===  #Status Epilepticus  #Grand Mal Seizures  - Sedation: Precedex, Ativan  - AEDs: Keppra 1.5g BID, Lamotrigine 100mg BID, Gabapentin 300mg BID, Vimpat 150mg BID  - EEG negative for seizures  - Neuro following, appreciate recs    #Schizophrenia  #Depression  - Lurasidone 40mg daily, ok per neuro  - Escitalopram 15mg daily    ===CVS===  #HTN #HLD   - Atorvastatin 20mg qhs    #Hypotension w/ propofol use  Off of Levo, MAP >65     #Widened Mediastinum  #Difference in BP in Extremities  #PAD w/ R foot cooler than L  Noticed during RRT: SBP 200s in the R arm and 140s in the L arm, which persisted on subsequent readings. On repeat in MICU, patient with 95/57 on RA and 110/63 on LA. Limited by body habitus, positioning, IV lines in upper extremities, PAD. CXR showed widened mediastinum. CT chest noncon on 1/17 did not comment. Patient is hemodynamically stable, no increasing lactic acidosis, and has no other signs for dissection, but with pulse pressure variation and widened mediastinum in the setting of initial complaint of syncope, will continue to monitor.   - CTM, if having true concern for dissection, would perform CTA despite CKD III and maintain T&S q72    ===PULM===  #Airway precautions  #VAZQUEZ  - Wean down FIO2 to minimum  - Titrate vent settings based on SpO2 and blood gas  - Head elevation to 15-30 degrees    ===RENAL===  #Metabolic acidosis  #Lactic acidosis  #CKD Stage III  #Electrolytes  - LR at 100cc/hr  - Trending labs, monitor electrolytes, I/O    #At risk of rhabdomyolysis  - CK uptrended from the day prior, from 745 to 793    ******GASTROINTESTINAL************  #Diet,NPO #OG  - C/w tube feeds     ******INFECTIOUS DISEASE************  #Aspiration PNA  #Flu A positive  - Ceftriaxone 1g daily (1/17-1/22)  - Tamiflu 30mg BID (1/18-1/22)  - Follow cultures: BCx, Bronch    ******ENDOCRINE************  #At risk of hypoglycemia  #Hx of secondary hyperparathyroidism  - FS q6h while NPO  - Follow thyroid studies, pending    ******HEME/ONC************  #Metastatic testicular cancer, recent chemo 06/2024  #DVT ppx  - subQ heparin    ===ETHICS===  #Code Status: FULL CODE    Signed,  Brittani Virgen  (PGY-1 Emergency Medicine) ASSESSMENT & PLAN  69M w/ hx of metastatic testicular cancer (s/p L orchiectomy, on etoposide/cisplatin chemo, last dose 6/2024), epilepsy (grand-mal seizures), schizophrenia, developmental delay, HTN, HLD, VAZQUEZ, stage III CKD, obesity, secondary hyperparathyroidism, anemia, thrombocytopenia who was admitted on 1/16/25 for fall and syncope. On 1/17 patient had multiple RRTs called for grand mal seizure activity and was initially recommended sepsis workup and antipyretics given T103F, but patient had x2 more episode and was ultimately given ativan 2mg, vimpat load 200mg, and intubated for airway protection with MICU transfer.     CHANGES TODAY  - Not stable for floor yet d/t secretions  - MRI deferred due to secretions  - SLP today for swallow, NPO ok for crushed pills and tube feeds for now  - D/c'd eeg per neuro, continue AEDs, monitor for now  - D'c'ed LR given concern for flash pulm edema    ===NEURO===  #Status Epilepticus  #Grand Mal Seizures  - AEDs: Keppra 1.5g BID, Lamotrigine 100mg BID, Gabapentin 300mg BID, Vimpat 150mg BID  - Ativan for seizures  - s/p vEEG, negative for seizures  - Neuro following, appreciate recs    #Schizophrenia  #Depression  - Lurasidone 40mg daily, ok per neuro  - Escitalopram 15mg daily    ===CVS===  #HTN #HLD   - Atorvastatin 20mg qhs    #Widened Mediastinum  #Difference in BP in Extremities  #PAD w/ R foot cooler than L  - CTM, if having true concern for dissection, would perform CTA despite CKD III and maintain T&S q72    ===PULM===  #Airway precautions  #VAZQUEZ  - Wean down FIO2 to minimum  - Titrate vent settings based on SpO2 and blood gas  - Head elevation to 15-30 degrees    ===RENAL===  #Metabolic acidosis  #Lactic acidosis  #CKD Stage III  #Electrolytes  - LR at 100cc/hr  - Trending labs, monitor electrolytes, I/O    #At risk of rhabdomyolysis  - CK uptrended from the day prior, from 745 to 793    ******GASTROINTESTINAL************  #Diet,NPO #OG  - C/w tube feeds     ******INFECTIOUS DISEASE************  #Aspiration PNA  #Flu A positive  - Ceftriaxone 1g daily (1/17-1/22)  - Tamiflu 30mg BID (1/18-1/22)  - Follow cultures: BCx, Bronch    ******ENDOCRINE************  #At risk of hypoglycemia  #Hx of secondary hyperparathyroidism  - FS q6h while NPO  - Follow thyroid studies, pending    ******HEME/ONC************  #Metastatic testicular cancer, recent chemo 06/2024  #DVT ppx  - subQ heparin    ===ETHICS===  #Code Status: FULL CODE    Signed,  Brittani Virgen  (PGY-1 Emergency Medicine) ASSESSMENT & PLAN  69M w/ hx of metastatic testicular cancer (s/p L orchiectomy, on etoposide/cisplatin chemo, last dose 6/2024), epilepsy (grand-mal seizures), schizophrenia, developmental delay, HTN, HLD, VAZQUEZ, stage III CKD, obesity, secondary hyperparathyroidism, anemia, thrombocytopenia who was admitted on 1/16/25 for fall and syncope. On 1/17 patient had multiple RRTs called for grand mal seizure activity and was initially recommended sepsis workup and antipyretics given T103F, but patient had x2 more episode and was ultimately given ativan 2mg, vimpat load 200mg, and intubated for airway protection with MICU transfer.     CHANGES TODAY  - Not stable for floor yet d/t secretions, likely tomorrow  - MRI deferred due to secretions  - SLP today for swallow, NPO ok for crushed pills and tube feeds for now  - D/c'd eeg per neuro, continue AEDs, monitor for now  - D'c'ed LR given concern for flash pulm edema    ===NEURO===  #Status Epilepticus  #Grand Mal Seizures  - AEDs: Keppra 1.5g BID, Lamotrigine 100mg BID, Gabapentin 300mg BID, Vimpat 150mg BID  - Ativan for seizures  - s/p vEEG, negative for seizures  - Neuro following, appreciate recs    #Schizophrenia  #Depression  - Lurasidone 40mg daily, ok per neuro  - Escitalopram 15mg daily    ===CVS===  #HTN #HLD   - Atorvastatin 20mg qhs    #Widened Mediastinum  #Difference in BP in Extremities  #PAD w/ R foot cooler than L  - CTM, if having true concern for dissection, would perform CTA despite CKD III  - Maintain T&S q72    ===PULM===  #s/p Intubation  #VAZQUEZ  - Oxygen by NC as needed    ===RENAL===  #Metabolic acidosis  #Lactic acidosis  #CKD Stage III  #Electrolytes  #Elevated CK, downtrending  - Trending labs, monitor electrolytes, I/O    ******GASTROINTESTINAL************  #Diet,NPO #OG  - Tube feeds, ok to crush pills pending SLP eval  - Formal SLP consult    ******INFECTIOUS DISEASE************  #Pneumonia  #Flu A positive  - Ceftriaxone 1g daily (1/17-1/22)  - Tamiflu 30mg BID (1/18-1/22)  - Follow cultures: BCx, Bronch (all neg to date)    ******ENDOCRINE************  #At risk of hypoglycemia  #Hx of secondary hyperparathyroidism  - FS q6h while NPO    ******HEME/ONC************  #Metastatic testicular cancer, recent chemo 06/2024  #DVT ppx  - subQ heparin    ===ETHICS===  #Code Status: FULL CODE    Signed,  Brittani Virgen  (PGY-1 Emergency Medicine) ASSESSMENT & PLAN  69M w/ hx of metastatic testicular cancer (s/p L orchiectomy, on etoposide/cisplatin chemo, last dose 6/2024), epilepsy (grand-mal seizures), schizophrenia, developmental delay, HTN, HLD, VAZQUEZ, stage III CKD, obesity, secondary hyperparathyroidism, anemia, thrombocytopenia who was admitted on 1/16/25 for fall and syncope. On 1/17 patient had multiple RRTs called for grand mal seizure activity and was initially recommended sepsis workup and antipyretics given T103F, but patient had x2 more episode and was ultimately given ativan 2mg, vimpat load 200mg, and intubated for airway protection with MICU transfer.     CHANGES TODAY  - Not stable for floor yet d/t secretions, likely tomorrow  - MRI deferred due to secretions  - PT/OT/SLP consults, NPO except meds  - D/c'd eeg per neuro, continue AEDs, monitor for now  - D'c'ed LR given concern for flash pulm edema    ===NEURO===  #Status Epilepticus  #Grand Mal Seizures  - AEDs: Keppra 1.5g BID, Lamotrigine 100mg BID, Gabapentin 300mg BID, Vimpat 150mg BID  - Ativan for seizures  - s/p vEEG, negative for seizures  - Neuro following, appreciate recs    #Schizophrenia  #Depression  - Lurasidone 40mg daily, ok per neuro  - Escitalopram 15mg daily    ===CVS===  #HTN #HLD   - Atorvastatin 20mg qhs    #Widened Mediastinum  #Difference in BP in Extremities  #PAD w/ R foot cooler than L  - CTM, if having true concern for dissection, would perform CTA despite CKD III  - Maintain T&S q72    ===PULM===  #s/p Intubation  #VAZQUEZ  - Oxygen by NC as needed    ===RENAL===  #Metabolic acidosis  #Lactic acidosis  #CKD Stage III  #Electrolytes  #Elevated CK, downtrending  - Trending labs, monitor electrolytes, I/O    ******GASTROINTESTINAL************  #Diet,NPO #OG  - Tube feeds, ok to crush pills pending SLP eval  - Formal SLP consult    ******INFECTIOUS DISEASE************  #Pneumonia  #Flu A positive  - Ceftriaxone 1g daily (1/17-1/22)  - Tamiflu 30mg BID (1/18-1/22)  - Follow cultures: BCx, Bronch (all neg to date)    ******ENDOCRINE************  #At risk of hypoglycemia  #Hx of secondary hyperparathyroidism  - FS q6h while NPO    ******HEME/ONC************  #Metastatic testicular cancer, recent chemo 06/2024  #DVT ppx  - subQ heparin    ===ETHICS===  #Code Status: FULL CODE  #Functional: PT/OT/SLP    Signed,  Brittani Virgen  (PGY-1 Emergency Medicine) ASSESSMENT & PLAN  69M w/ hx of metastatic testicular cancer (s/p L orchiectomy, on etoposide/cisplatin chemo, last dose 6/2024), epilepsy (grand-mal seizures), schizophrenia, developmental delay, HTN, HLD, VAZQUEZ, stage III CKD, obesity, secondary hyperparathyroidism, anemia, thrombocytopenia who was admitted on 1/16/25 for fall and syncope. On 1/17 patient had multiple RRTs called for grand mal seizure activity and was initially recommended sepsis workup and antipyretics given T103F, but patient had x2 more episode and was ultimately given ativan 2mg, vimpat load 200mg, and intubated for airway protection with MICU transfer.     CHANGES TODAY  - Not stable for floor yet d/t secretions, likely tomorrow  - MRI deferred due to secretions  - PT/OT/SLP consults, NPO except meds  - D/c'd eeg per neuro, continue AEDs, monitor for now  - D'c'ed LR given concern for flash pulm edema    ===NEURO===  #Status Epilepticus  #Grand Mal Seizures  - AEDs: Keppra 1.5g BID, Lamotrigine 100mg BID, Gabapentin 300mg BID, Vimpat 150mg BID  - Ativan for seizures  - Deferred MRI given secretions  - s/p vEEG, negative for seizures  - Neuro following, appreciate recs    #Schizophrenia  #Depression  - Lurasidone 40mg daily, ok per neuro  - Escitalopram 15mg daily    ===CVS===  #HTN #HLD   - Atorvastatin 20mg qhs    #Widened Mediastinum  #Difference in BP in Extremities  #PAD w/ R foot cooler than L  - CTM, if having true concern for dissection, would perform CTA despite CKD III  - Maintain T&S q72    ===PULM===  #s/p Intubation  #VAZQUEZ  - Oxygen by NC as needed    ===RENAL===  #Metabolic acidosis  #Lactic acidosis  #CKD Stage III  #Electrolytes  #Elevated CK, downtrending  - Trending labs, monitor electrolytes, I/O , using condom cath    ******GASTROINTESTINAL************  #Diet,NPO #OG  - Tube feeds, ok to crush pills pending SLP eval  - Formal SLP consult    ******INFECTIOUS DISEASE************  #Pneumonia  #Flu A positive  - Ceftriaxone 1g daily (1/17-1/22)  - Tamiflu 30mg BID (1/18-1/22)  - Follow cultures: BCx, Bronch (all neg to date)    ******ENDOCRINE************  #At risk of hypoglycemia  #Hx of secondary hyperparathyroidism  - FS q6h while NPO    ******HEME/ONC************  #Metastatic testicular cancer, recent chemo 06/2024  #DVT ppx  - subQ heparin    ===ETHICS===  #Code Status: FULL CODE  #Functional: PT/OT/SLP    Signed,  Brittani Virgen  (PGY-1 Emergency Medicine) ASSESSMENT & PLAN  69M w/ hx of metastatic testicular cancer (s/p L orchiectomy, on etoposide/cisplatin chemo, last dose 6/2024), epilepsy (grand-mal seizures), schizophrenia, developmental delay, HTN, HLD, VAZQUEZ, stage III CKD, obesity, secondary hyperparathyroidism, anemia, thrombocytopenia who was admitted on 1/16/25 for fall and syncope. On 1/17 patient had multiple RRTs called for grand mal seizure activity and was initially recommended sepsis workup and antipyretics given T103F, but patient had x2 more episode and was ultimately given ativan 2mg, vimpat load 200mg, and intubated for airway protection with MICU transfer.     CHANGES TODAY  - Not stable for floor yet d/t secretions, likely tomorrow vs later today  - MRI deferred due to secretions  - PT/OT/SLP consults, NPO except meds  - AVAPS at night okay, on NC now  - D/c'd eeg per neuro, continue AEDs, monitor for now  - D'c'ed LR given concern for flash pulm edema    ===NEURO===  #Status Epilepticus  #Grand Mal Seizures  - AEDs: Keppra 1.5g BID, Lamotrigine 100mg BID, Gabapentin 300mg BID, Vimpat 150mg BID  - Ativan for seizures  - Deferred MRI given secretions  - s/p vEEG, negative for seizures  - Neuro following, appreciate recs    #Schizophrenia  #Depression  - Lurasidone 40mg daily, ok per neuro  - Escitalopram 15mg daily    ===CVS===  #HTN #HLD   - Atorvastatin 20mg qhs    #Widened Mediastinum  #Difference in BP in Extremities  #PAD w/ R foot cooler than L  - CTM, if having true concern for dissection, would perform CTA despite CKD III  - Maintain T&S q72    ===PULM===  #s/p Intubation  #VAZQUEZ  - Oxygen by NC as needed    ===RENAL===  #Metabolic acidosis  #Lactic acidosis  #CKD Stage III  #Electrolytes  #Elevated CK, downtrending  - Trending labs, monitor electrolytes, I/O , using condom cath    ******GASTROINTESTINAL************  #Diet,NPO #OG  - Tube feeds, ok to crush pills pending SLP eval  - Formal SLP consult    ******INFECTIOUS DISEASE************  #Pneumonia  #Flu A positive  - Ceftriaxone 1g daily (1/17-1/22)  - Tamiflu 30mg BID (1/18-1/22)  - Follow cultures: BCx, Bronch (all neg to date)    ******ENDOCRINE************  #At risk of hypoglycemia  #Hx of secondary hyperparathyroidism  - FS q6h while NPO    ******HEME/ONC************  #Metastatic testicular cancer, recent chemo 06/2024  #DVT ppx  - subQ heparin    ===ETHICS===  #Code Status: FULL CODE  #Functional: PT/OT/SLP    Signed,  Brittani Virgen  (PGY-1 Emergency Medicine) ASSESSMENT & PLAN  69M w/ hx of metastatic testicular cancer (s/p L orchiectomy, on etoposide/cisplatin chemo, last dose 6/2024), epilepsy (grand-mal seizures), schizophrenia, developmental delay, HTN, HLD, VAZQUEZ, stage III CKD, obesity, secondary hyperparathyroidism, anemia, thrombocytopenia who was admitted on 1/16/25 for fall and syncope. On 1/17 patient had multiple RRTs called for grand mal seizure activity and was initially recommended sepsis workup and antipyretics given T103F, but patient had x2 more episode and was ultimately given ativan 2mg, vimpat load 200mg, and intubated for airway protection with MICU transfer.     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  - AEDs: Keppra 1.5g BID, Lamotrigine 100mg BID, Gabapentin 300mg BID, Vimpat 150mg BID  - Ativan for seizures prn  - Deferred MRI given secretions  - s/p vEEG, negative for seizures  - Neuro following, appreciate recs    #Schizophrenia  #Depression  - Lurasidone 40mg daily, ok per neuro  - Escitalopram 15mg daily    ===CVS===  #HTN #HLD   - Atorvastatin 20mg qhs    #Widened Mediastinum  #Difference in BP in Extremities  #PAD w/ R foot cooler than L  - CTM, if having true concern for dissection, would perform CTA despite CKD III  - Maintain T&S q72    ===PULM===  #s/p Intubation  #VAZQUEZ  - Oxygen by NC as needed  - AVAPS at night    ===RENAL===  #Metabolic acidosis  #Lactic acidosis  #CKD Stage III  #Electrolytes  #Elevated CK, downtrending  - Trending labs, monitor electrolytes, I/O , using condom cath    ******GASTROINTESTINAL************  #Diet,NPO #OG  - Tube feeds, ok to crush pills pending SLP eval  - Formal SLP consult    ******INFECTIOUS DISEASE************  #Pneumonia  #Flu A positive  - Ceftriaxone 1g daily (1/17-1/21)  - Tamiflu 30mg BID (1/18-1/22)  - Follow cultures: BCx, Bronch (all neg to date)  - ID following    ******ENDOCRINE************  #At risk of hypoglycemia  #Hx of secondary hyperparathyroidism  - FS q6h while NPO    ******HEME/ONC************  #Metastatic testicular cancer, recent chemo 06/2024  #DVT ppx  - 7500U subQ heparin    ===ETHICS===  #Code Status: FULL CODE  #Functional: PT/OT/SLP    Signed,  Brittani Virgen  (PGY-1 Emergency Medicine) ASSESSMENT & PLAN  69M w/ hx of metastatic testicular cancer (s/p L orchiectomy, on etoposide/cisplatin chemo, last dose 6/2024), epilepsy (grand-mal seizures), schizophrenia, developmental delay, HTN, HLD, VAZQUEZ, stage III CKD, obesity, secondary hyperparathyroidism, anemia, thrombocytopenia who was admitted on 1/16/25 for fall and syncope. On 1/17 patient had multiple RRTs called for grand mal seizure activity and was initially recommended sepsis workup and antipyretics given T103F, but patient had x2 more episode and was ultimately given ativan 2mg, vimpat load 200mg, and intubated for airway protection with MICU transfer.     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  - AEDs: Keppra 1.5g BID, Lamotrigine 100mg BID, Gabapentin 300mg BID, Vimpat 150mg BID  - Ativan for seizures prn  - Deferred MRI given secretions  - s/p vEEG, negative for seizures  - Neuro following, appreciate recs    #Schizophrenia  #Depression  - Lurasidone 40mg daily, ok per neuro  - Escitalopram 15mg daily    ===CVS===  #HTN #HLD   - Atorvastatin 20mg qhs    #Widened Mediastinum  #Difference in BP in Extremities  #PAD w/ R foot cooler than L  - CTM, if having true concern for dissection, would perform CTA despite CKD III  - Maintain T&S q72    ===PULM===  #s/p Intubation  #VAZQUEZ  - Oxygen by NC as needed  - AVAPS at night    ===RENAL===  #Metabolic acidosis  #Lactic acidosis  #CKD Stage III  #Electrolytes  #Elevated CK, downtrending  - Trending labs, monitor electrolytes, I/O , using condom cath    ===GI===  #Diet,NPO #OG  - Tube feeds, ok to crush pills pending SLP eval  - Formal SLP consult    ===ID===  #Pneumonia  #Flu A positive  - Ceftriaxone 1g daily (1/17-1/21)  - Tamiflu 30mg BID (1/18-1/22)  - Follow cultures: BCx, Bronch (all neg to date)  - ID following    ===ENDO===  #At risk of hypoglycemia  #Hx of secondary hyperparathyroidism  - FS q6h while NPO    ===HEME/ONC===  #Metastatic testicular cancer, recent chemo 06/2024  #DVT ppx  - 7500U subQ heparin    ===ETHICS===  #Code Status: FULL CODE  #Functional: PT/OT/SLP    Signed,  Brittani Virgen  (PGY-1 Emergency Medicine)

## 2025-01-21 LAB
ALBUMIN SERPL ELPH-MCNC: 3.1 G/DL — LOW (ref 3.3–5)
ALP SERPL-CCNC: 103 U/L — SIGNIFICANT CHANGE UP (ref 40–120)
ALT FLD-CCNC: 51 U/L — HIGH (ref 10–45)
ANION GAP SERPL CALC-SCNC: 15 MMOL/L — SIGNIFICANT CHANGE UP (ref 5–17)
APTT BLD: 31.4 SEC — SIGNIFICANT CHANGE UP (ref 24.5–35.6)
AST SERPL-CCNC: 58 U/L — HIGH (ref 10–40)
BILIRUB SERPL-MCNC: 0.2 MG/DL — SIGNIFICANT CHANGE UP (ref 0.2–1.2)
BUN SERPL-MCNC: 40 MG/DL — HIGH (ref 7–23)
CALCIUM SERPL-MCNC: 8.8 MG/DL — SIGNIFICANT CHANGE UP (ref 8.4–10.5)
CHLORIDE SERPL-SCNC: 106 MMOL/L — SIGNIFICANT CHANGE UP (ref 96–108)
CK SERPL-CCNC: 314 U/L — HIGH (ref 30–200)
CO2 SERPL-SCNC: 19 MMOL/L — LOW (ref 22–31)
CREAT SERPL-MCNC: 2.53 MG/DL — HIGH (ref 0.5–1.3)
EGFR: 27 ML/MIN/1.73M2 — LOW
GAS PNL BLDA: SIGNIFICANT CHANGE UP
GLUCOSE BLDC GLUCOMTR-MCNC: 120 MG/DL — HIGH (ref 70–99)
GLUCOSE SERPL-MCNC: 123 MG/DL — HIGH (ref 70–99)
HCT VFR BLD CALC: 33.1 % — LOW (ref 39–50)
HGB BLD-MCNC: 10.6 G/DL — LOW (ref 13–17)
INR BLD: 0.98 RATIO — SIGNIFICANT CHANGE UP (ref 0.85–1.16)
MAGNESIUM SERPL-MCNC: 2.3 MG/DL — SIGNIFICANT CHANGE UP (ref 1.6–2.6)
MCHC RBC-ENTMCNC: 30.9 PG — SIGNIFICANT CHANGE UP (ref 27–34)
MCHC RBC-ENTMCNC: 32 G/DL — SIGNIFICANT CHANGE UP (ref 32–36)
MCV RBC AUTO: 96.5 FL — SIGNIFICANT CHANGE UP (ref 80–100)
NRBC # BLD: 0 /100 WBCS — SIGNIFICANT CHANGE UP (ref 0–0)
NRBC BLD-RTO: 0 /100 WBCS — SIGNIFICANT CHANGE UP (ref 0–0)
PHOSPHATE SERPL-MCNC: 4 MG/DL — SIGNIFICANT CHANGE UP (ref 2.5–4.5)
PLATELET # BLD AUTO: 103 K/UL — LOW (ref 150–400)
POTASSIUM SERPL-MCNC: 4 MMOL/L — SIGNIFICANT CHANGE UP (ref 3.5–5.3)
POTASSIUM SERPL-SCNC: 4 MMOL/L — SIGNIFICANT CHANGE UP (ref 3.5–5.3)
PROT SERPL-MCNC: 5.9 G/DL — LOW (ref 6–8.3)
PROTHROM AB SERPL-ACNC: 11.2 SEC — SIGNIFICANT CHANGE UP (ref 9.9–13.4)
RBC # BLD: 3.43 M/UL — LOW (ref 4.2–5.8)
RBC # FLD: 13.1 % — SIGNIFICANT CHANGE UP (ref 10.3–14.5)
SODIUM SERPL-SCNC: 140 MMOL/L — SIGNIFICANT CHANGE UP (ref 135–145)
WBC # BLD: 6.58 K/UL — SIGNIFICANT CHANGE UP (ref 3.8–10.5)
WBC # FLD AUTO: 6.58 K/UL — SIGNIFICANT CHANGE UP (ref 3.8–10.5)

## 2025-01-21 PROCEDURE — 99233 SBSQ HOSP IP/OBS HIGH 50: CPT | Mod: GC

## 2025-01-21 RX ORDER — ESCITALOPRAM 10 MG/1
15 TABLET, FILM COATED ORAL DAILY
Refills: 0 | Status: DISCONTINUED | OUTPATIENT
Start: 2025-01-21 | End: 2025-01-23

## 2025-01-21 RX ORDER — AMLODIPINE BESYLATE 5 MG
5 TABLET ORAL DAILY
Refills: 0 | Status: DISCONTINUED | OUTPATIENT
Start: 2025-01-21 | End: 2025-01-23

## 2025-01-21 RX ORDER — GABAPENTIN 800 MG/1
300 TABLET ORAL
Refills: 0 | Status: DISCONTINUED | OUTPATIENT
Start: 2025-01-21 | End: 2025-01-23

## 2025-01-21 RX ORDER — LAMOTRIGINE 100 MG/1
100 TABLET ORAL
Refills: 0 | Status: DISCONTINUED | OUTPATIENT
Start: 2025-01-21 | End: 2025-01-29

## 2025-01-21 RX ORDER — ACETYLCYSTEINE 200 MG/ML
4 VIAL (ML) MISCELLANEOUS EVERY 6 HOURS
Refills: 0 | Status: DISCONTINUED | OUTPATIENT
Start: 2025-01-21 | End: 2025-01-23

## 2025-01-21 RX ORDER — ACETAMINOPHEN 160 MG/5ML
1000 SUSPENSION ORAL ONCE
Refills: 0 | Status: COMPLETED | OUTPATIENT
Start: 2025-01-21 | End: 2025-01-21

## 2025-01-21 RX ORDER — LURASIDONE HYDROCHLORIDE 80 MG/1
40 TABLET, FILM COATED ORAL DAILY
Refills: 0 | Status: DISCONTINUED | OUTPATIENT
Start: 2025-01-21 | End: 2025-01-23

## 2025-01-21 RX ADMIN — LIDOCAINE HYDROCHLORIDE 1 PATCH: 30 CREAM TOPICAL at 07:45

## 2025-01-21 RX ADMIN — OSELTAMIVIR PHOSPHATE 30 MILLIGRAM(S): 75 CAPSULE ORAL at 06:30

## 2025-01-21 RX ADMIN — ANTISEPTIC SURGICAL SCRUB 1 APPLICATION(S): 0.04 SOLUTION TOPICAL at 06:31

## 2025-01-21 RX ADMIN — Medication 4 MILLILITER(S): at 18:00

## 2025-01-21 RX ADMIN — IPRATROPIUM BROMIDE AND ALBUTEROL SULFATE 3 MILLILITER(S): .5; 2.5 SOLUTION RESPIRATORY (INHALATION) at 23:09

## 2025-01-21 RX ADMIN — Medication 5 MILLIGRAM(S): at 22:00

## 2025-01-21 RX ADMIN — IPRATROPIUM BROMIDE AND ALBUTEROL SULFATE 3 MILLILITER(S): .5; 2.5 SOLUTION RESPIRATORY (INHALATION) at 18:00

## 2025-01-21 RX ADMIN — Medication 7500 UNIT(S): at 18:09

## 2025-01-21 RX ADMIN — GABAPENTIN 300 MILLIGRAM(S): 800 TABLET ORAL at 18:09

## 2025-01-21 RX ADMIN — IPRATROPIUM BROMIDE AND ALBUTEROL SULFATE 3 MILLILITER(S): .5; 2.5 SOLUTION RESPIRATORY (INHALATION) at 11:00

## 2025-01-21 RX ADMIN — LAMOTRIGINE 100 MILLIGRAM(S): 100 TABLET ORAL at 06:30

## 2025-01-21 RX ADMIN — ATORVASTATIN CALCIUM 20 MILLIGRAM(S): 80 TABLET, FILM COATED ORAL at 22:00

## 2025-01-21 RX ADMIN — Medication 4 MILLILITER(S): at 04:37

## 2025-01-21 RX ADMIN — ACETAMINOPHEN 400 MILLIGRAM(S): 160 SUSPENSION ORAL at 07:45

## 2025-01-21 RX ADMIN — CEFTRIAXONE 100 MILLIGRAM(S): 250 INJECTION, POWDER, FOR SOLUTION INTRAMUSCULAR; INTRAVENOUS at 22:00

## 2025-01-21 RX ADMIN — LAMOTRIGINE 100 MILLIGRAM(S): 100 TABLET ORAL at 18:16

## 2025-01-21 RX ADMIN — Medication 4 MILLILITER(S): at 23:09

## 2025-01-21 RX ADMIN — LACOSAMIDE 130 MILLIGRAM(S): 200 TABLET, FILM COATED ORAL at 06:30

## 2025-01-21 RX ADMIN — LEVETIRACETAM 400 MILLIGRAM(S): 750 TABLET, FILM COATED ORAL at 06:27

## 2025-01-21 RX ADMIN — Medication 4 MILLILITER(S): at 11:00

## 2025-01-21 RX ADMIN — ESCITALOPRAM 15 MILLIGRAM(S): 10 TABLET, FILM COATED ORAL at 12:35

## 2025-01-21 RX ADMIN — ACETAMINOPHEN 1000 MILLIGRAM(S): 160 SUSPENSION ORAL at 08:15

## 2025-01-21 RX ADMIN — OSELTAMIVIR PHOSPHATE 30 MILLIGRAM(S): 75 CAPSULE ORAL at 18:10

## 2025-01-21 RX ADMIN — LURASIDONE HYDROCHLORIDE 40 MILLIGRAM(S): 80 TABLET, FILM COATED ORAL at 18:09

## 2025-01-21 RX ADMIN — LEVETIRACETAM 400 MILLIGRAM(S): 750 TABLET, FILM COATED ORAL at 17:45

## 2025-01-21 RX ADMIN — LIDOCAINE HYDROCHLORIDE 1 PATCH: 30 CREAM TOPICAL at 05:00

## 2025-01-21 RX ADMIN — GABAPENTIN 300 MILLIGRAM(S): 800 TABLET ORAL at 06:30

## 2025-01-21 RX ADMIN — Medication 7500 UNIT(S): at 06:30

## 2025-01-21 RX ADMIN — IPRATROPIUM BROMIDE AND ALBUTEROL SULFATE 3 MILLILITER(S): .5; 2.5 SOLUTION RESPIRATORY (INHALATION) at 04:37

## 2025-01-21 RX ADMIN — LACOSAMIDE 130 MILLIGRAM(S): 200 TABLET, FILM COATED ORAL at 18:09

## 2025-01-21 RX ADMIN — NYSTATIN 1 APPLICATION(S): 100000 POWDER TOPICAL at 06:31

## 2025-01-21 NOTE — PHYSICAL THERAPY INITIAL EVALUATION ADULT - ADDITIONAL COMMENTS
Pt lives in co-op apartment with 4 stairs to entrance +bilateral handrails. Prior to admission pt independent with all functional mobility including ambulation of short distances with cane, independent with dressing, required assist for some ADL's from HHA. Pt with HHA 9am -1pm M-F. Sister at b/s reports she takes pt to appointments and he is able to ambulate out to her car. Pt lives in co-op apartment with 4 stairs to entrance +bilateral handrails. Prior to admission pt independent with all functional mobility including ambulation of short distances with cane, independent with dressing, required assist for some ADL's from HHA. Pt with HHA 9am -1pm M-F. Sister at b/s reports she takes pt to appointments and he is able to ambulate out to her car. +tub shower, +grabbars, no shower chair.

## 2025-01-21 NOTE — DIETITIAN INITIAL EVALUATION ADULT - ADD RECOMMEND
Medical team to advance diet when medically feasible via tolerated route. Defer diet/texture advancement to medical team/SLP as indicated. If NPO status > 7 days, consider alternate means of nutrition if within pt/family GOC.

## 2025-01-21 NOTE — SWALLOW FEES ASSESSMENT ADULT - COMMENTS
1/17 CT chest with anterior bowing of the posterior tracheal wall, suggestive of tracheomalacia; trace right pleural effusion.    1/19: extubated to AVAPS; post extubation with copious secretions  1/21: weaned to NC; AVAPS at night; per RN, pt able to expectorate; earlier today chest PT done and nasopharyngeal suctioning required    Started on ceftriaxone for possible aspiration, started on tamiflu 1/18 areas of granulation b/l posterior vocal cords, right vocal fold; subglottic anterior tracheal wall; areas of erythema along left vocal fold

## 2025-01-21 NOTE — DIETITIAN INITIAL EVALUATION ADULT - PERTINENT LABORATORY DATA
01-21    140  |  106  |  40[H]  ----------------------------<  123[H]  4.0   |  19[L]  |  2.53[H]    Ca    8.8      21 Jan 2025 00:43  Phos  4.0     01-21  Mg     2.3     01-21    TPro  5.9[L]  /  Alb  3.1[L]  /  TBili  0.2  /  DBili  x   /  AST  58[H]  /  ALT  51[H]  /  AlkPhos  103  01-21    POCT Blood Glucose.: 114 mg/dL (01-20-25 @ 16:56)

## 2025-01-21 NOTE — PROGRESS NOTE ADULT - ASSESSMENT
ASSESSMENT & PLAN  69M w/ hx of metastatic testicular cancer (s/p L orchiectomy, on etoposide/cisplatin chemo, last dose 6/2024), epilepsy (grand-mal seizures), schizophrenia, developmental delay, HTN, HLD, VAZQUEZ, stage III CKD, obesity, secondary hyperparathyroidism, anemia, thrombocytopenia who was admitted on 1/16/25 for fall and syncope. On 1/17 patient had multiple RRTs called for grand mal seizure activity and was initially recommended sepsis workup and antipyretics given T103F, but patient had x2 more episode and was ultimately given ativan 2mg, vimpat load 200mg, and intubated for airway protection with MICU transfer. Extubated 1/19 - to AVAPS, post extubation with increased secretion now better    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  - AEDs: Keppra 1.5g BID, Lamotrigine 100mg BID, Gabapentin 300mg BID, Vimpat 150mg BID  - Ativan for seizures prn  -per Neuro MRI brain w and wo contrast to look for potential seizure foci that could cause increased frequency especially given hx of metastatic testicular cancer  - Deferred MRI given copious secretions   - CT head with no brain mets  - s/p vEEG, negative for seizures  - Neuro following, appreciate recs    #Schizophrenia  #Depression  - Lurasidone 40mg daily, ok per neuro  - Escitalopram 15mg daily    ===CVS===  #HTN #HLD   - Atorvastatin 20mg qhs  -post extubation concerns for Flash pulmonary edema with SBP 180s, received Hydralazine 10 x 2  -1/17 echo with Left ventricular systolic function is normal with an ejection fraction of 62 %.There are no regional wall motion abnormalities seen. Normal right ventricular cavity size and normal right ventricular systolic function.    #Difference in BP in Extremities  #PAD w/ R foot cooler than L  - CTM, if having true concern for dissection, would perform CTA despite CKD III  - Maintain T&S q72    ===PULM===  #s/p Intubation extubated 1/19 to AVAPS  #VAZQUEZ  - Oxygen by NC as needed  - AVAPS at night  - Secretions better today  - 1/17 CT chest with anterior bowing of the posterior tracheal wall, suggestive of tracheomalacia.  and trace right pleural effusion.    ===RENAL===  #Metabolic acidosis  #Lactic acidosis  #SHAGUFTA on CKD Stage III  #Elevated CK, downtrending 1000s to 300s  - Trending labs, monitor electrolytes, I/O , using condom cath    ===GI===  #Diet,NPO #OG  - Tube feeds, ok to crush pills pending SLP eval  - Formal SLP consult  - last BM from 1/20    ===ID===  #Pneumonia  #Flu A positive  - Ceftriaxone 1g daily (1/17-1/21)  - Tamiflu 30mg BID (1/18-1/22)  - Follow cultures: BCx, Bronch (all neg to date)  - ID following    ===ENDO===  #At risk of hypoglycemia  #Hx of secondary hyperparathyroidism  - FS q6h while NPO    ===HEME/ONC===  #Metastatic testicular cancer, recent chemo 06/2024  #DVT ppx  - 7500U subQ heparin    ===ETHICS===  #Code Status: FULL CODE  #Functional: PT/OT/SLP      ASSESSMENT & PLAN  69M w/ hx of metastatic testicular cancer (s/p L orchiectomy, on etoposide/cisplatin chemo, last dose 6/2024), epilepsy (grand-mal seizures), schizophrenia, developmental delay, HTN, HLD, VAZQUEZ, stage III CKD, obesity, secondary hyperparathyroidism, anemia, thrombocytopenia who was admitted on 1/16/25 for fall and syncope. On 1/17 patient had multiple RRTs called for grand mal seizure activity and was initially recommended sepsis workup and antipyretics given T103F, but patient had x2 more episode and was ultimately given ativan 2mg, vimpat load 200mg, and intubated for airway protection with MICU transfer. Extubated 1/19 - to AVAPS, post extubation with increased secretion now better    ===NEURO===  #Status Epilepticus  #Grand Mal Seizures  - c/w AEDs: Keppra 1.5g BID, Lamotrigine 100mg BID, Gabapentin 300mg BID, Vimpat 150mg BID  - Ativan for seizures prn  -per Neuro MRI brain w and wo contrast to look for potential seizure foci that could cause increased frequency especially given hx of metastatic testicular cancer  - Deferred MRI given copious secretions   - CT head with no brain mets  - s/p vEEG, negative for seizures  - Neuro following, appreciate recs  #Schizophrenia  #Depression  - Escitalopram 15mg daily    ===CVS===  #HTN #HLD   - Atorvastatin 20mg qhs  -post extubation concerns for Flash pulmonary edema with SBP 180s, received Hydralazine 10 x 2  -1/17 echo with Left ventricular systolic function is normal with an ejection fraction of 62 %.There are no regional wall motion abnormalities seen. Normal right ventricular cavity size and normal right ventricular systolic function.    ===PULM===  #s/p Intubation extubated 1/19 to AVAPS  #VAZQUEZ  - Oxygen by NC as needed  - AVAPS at night  - Secretions better today, still suctioning q3-4hrs  - 1/17 CT chest with anterior bowing of the posterior tracheal wall, suggestive of tracheomalacia.  and trace right pleural effusion.    ===RENAL===  #Metabolic acidosis  #Lactic acidosis  #SHAGUFTA on CKD Stage III  #Elevated CK, downtrending 1000s to 300s  - Trending labs, monitor electrolytes, I/O , using condom cath    ===GI===  #Diet,NPO #OG  - Tube feeds, ok to crush pills pending SLP eval  - Formal SLP consulted - Pt failed official study, now pending FEESST study  - last BM from 1/20    ===ID===  #Pneumonia  #Flu A positive  - Ceftriaxone 1g daily (1/17-1/21)  - Tamiflu 30mg BID (1/18-1/22)  - Follow cultures: BCx, Bronch (all neg to date)  - ID following    ===ENDO===  #At risk of hypoglycemia  #Hx of secondary hyperparathyroidism  - FS q6h while NPO    ===HEME/ONC===  #Metastatic testicular cancer, recent chemo 06/2024  #DVT ppx  - 7500U subQ heparin    ===ETHICS===  #Code Status: FULL CODE  #Functional: PT/OT/SLP      ASSESSMENT & PLAN  69M w/ hx of metastatic testicular cancer (s/p L orchiectomy, on etoposide/cisplatin chemo, last dose 6/2024), epilepsy (grand-mal seizures), schizophrenia, developmental delay, HTN, HLD, VAZQUEZ, stage III CKD, obesity, secondary hyperparathyroidism, anemia, thrombocytopenia, ? spinal surgery who was admitted on 1/16/25 for fall and syncope. On 1/17 patient had multiple RRTs called for grand mal seizure activity and was initially recommended sepsis workup and antipyretics given T103F, but patient had x2 more episode and was ultimately given ativan 2mg, vimpat load 200mg, and intubated for airway protection with MICU transfer. Extubated 1/19 - to AVAPS, post extubation with increased secretion now better    ===NEURO===  #Status Epilepticus  #Grand Mal Seizures  - c/w AEDs: Keppra 1.5g BID, Lamotrigine 100mg BID, Gabapentin 300mg BID, Vimpat 150mg BID  - Ativan for seizures prn  -per Neuro MRI brain w and wo contrast to look for potential seizure foci that could cause increased frequency especially given hx of metastatic testicular cancer  - Deferred MRI given copious secretions   - CT head with no brain mets  - s/p vEEG, negative for seizures  - Neuro following, appreciate recs  #Schizophrenia  #Depression  - Escitalopram 15mg daily    ===CVS===  #HTN #HLD   - Atorvastatin 20mg qhs  -post extubation concerns for Flash pulmonary edema with SBP 180s, received Hydralazine 10 x 2  -1/17 echo with Left ventricular systolic function is normal with an ejection fraction of 62 %.There are no regional wall motion abnormalities seen. Normal right ventricular cavity size and normal right ventricular systolic function.    ===PULM===  #s/p Intubation extubated 1/19 to AVAPS  #VAZQUEZ  - Oxygen by NC as needed  - AVAPS at night  - Secretions better today, still suctioning q3-4hrs  - 1/17 CT chest with anterior bowing of the posterior tracheal wall, suggestive of tracheomalacia.  and trace right pleural effusion.    ===RENAL===  #Metabolic acidosis  #Lactic acidosis  #SHAGUFTA on CKD Stage III  #Elevated CK, downtrending 1000s to 300s  - Trending labs, monitor electrolytes, I/O , using condom cath    ===GI===  #Diet,NPO #OG  - Tube feeds, ok to crush pills pending SLP eval  - Formal SLP consulted - Pt failed official study, now pending FEESST study  - last BM from 1/20    ===ID===  #Pneumonia  #Flu A positive  - Ceftriaxone 1g daily (1/17-1/21)  - Tamiflu 30mg BID (1/18-1/22)  - Follow cultures: BCx, Bronch (all neg to date)  - ID following    ===ENDO===  #At risk of hypoglycemia  #Hx of secondary hyperparathyroidism  - FS q6h while NPO    ===HEME/ONC===  #Metastatic testicular cancer, recent chemo 06/2024  #DVT ppx  - 7500U subQ heparin    ===ETHICS===  #Code Status: FULL CODE  #Functional: PT/OT/SLP

## 2025-01-21 NOTE — SWALLOW FEES ASSESSMENT ADULT - ADDITIONAL RECOMMENDATIONS
GOAL: Patient will obtain safe and adequate nutrition/hydration/medications via least restrictive means. GOAL: Patient will obtain safe and adequate nutrition/hydration/medications via least restrictive means.    Has upper lower partials at home; asking sister to bring in for repeat FEES end of week to assess for solid and liquid upgrade.

## 2025-01-21 NOTE — PROGRESS NOTE ADULT - ASSESSMENT
69M w/ hx of metastatic testicular cancer (s/p L orchiectomy, on etoposide/cisplatin chemo), epilepsy (grand-mal seizures), schizophrenia, developmental delay, HTN, HLD, VAZQUEZ, stage III CKD, severe obesity, secondary hyperparathyroidism, anemia, thrombocytopenia who presented to ED for fall, possible seizure prior.    Plan:    # Syncopal seizure:  -  Patient with hx of epilepsy, generalized grand mal seizures  - C/w Lamictal and Keppra  - CTH/C spine/MF negative for acute ICH, notable for mild R hematoma  - 1/17 sp multiple RRTs  for grand mal seizure activity and was initially recommended sepsis workup and antipyretics given T103F, but patient had x2 more episode and was ultimately given ativan 2mg, vimpat load 200mg, and intubated for airway protection with MICU transfer.   - Extubated 1/20  - Keppra 1.5g BID, Lamotrigine 100mg BID, Gabapentin 300mg BID, Vimpat 150mg BID  - Ativan for seizures PRM  - Per Neuro -> MRI brain w and wo contrast to look for potential seizure foci that could cause increased frequency especially given hx of metastatic testicular cancer; deferred MRI given copious secretions   - CT head with no brain mets  - s/p vEEG, negative for seizures  - Care per MICU    # Flu:  - Tamiflu to be completed today; 1/21  - Monitor temps/WBC    # CKD3:  - Monitor I/O's  - Serial Cr  - Avoid nephrotoxins  - Renally dose medications    # HTN/ HLD:  - C/w home CV meds  - Post extubation concerns for Flash pulmonary edema with SBP 180s, received Hydralazine 10 x 2  - Echo with Left ventricular systolic function is normal with an ejection fraction of 62 %. There are no regional wall motion abnormalities seen. Normal right ventricular cavity size and normal right ventricular systolic function.    # Schizophrenia/Depression:  - C/w home meds    # VAZQUEZ:  - CPAP at night    # Hx of metastatic testicular cancer, s/p L orchiectomy, on etoposide/cisplatin chemo, last dose on 6/21/24:  - Outpt Oncology follow-up    # DVT ppx:  - Heparin sq    [facility]  [phone]  MD. GAMAL Cornell

## 2025-01-21 NOTE — CHART NOTE - [id number]_[id number]_CORE
MICU Transfer Note    Transfer from: MICU    Transfer to: ( x ) Medicine    (  ) Telemetry     (   ) RCU        (    ) Palliative         (   ) Stroke Unit          (   ) __________________    Accepting Physician:     This is a 69M w/ hx of metastatic testicular cancer (s/p L orchiectomy, on etoposide/cisplatin chemo, last dose 6/2024), epilepsy (grand-mal seizures), schizophrenia, developmental delay, HTN, HLD, VAZQUEZ, stage III CKD, obesity, secondary hyperparathyroidism, anemia, thrombocytopenia, ? spinal surgery who was admitted on 1/16/25 for fall and syncope. On 1/17 patient had multiple RRTs called for grand mal seizure activity and  patient had x2 more episode and was ultimately given ativan 2mg, Vimpat load 200mg, and intubated for airway protection with MICU transfer. Extubated 1/19 - to AVAPS, post extubation with increased secretion, now better and Hemodynamically stable to downgrade to medicine floor with pulse oximetry.    ===NEURO===  #Status Epilepticus  #Grand Mal Seizures  #Schizophrenia  #Depression  - c/w AEDs: Keppra 1.5g BID, Lamotrigine 100mg BID, Gabapentin 300mg BID, Vimpat 150mg BID  - Ativan for seizures prn  -per Neuro MRI brain w and wo contrast to look for potential seizure foci that could cause increased frequency especially given hx of metastatic testicular cancer  - Deferred MRI given copious secretions   - CT head with no brain mets  - s/p vEEG, negative for seizures  - Neuro following, appreciate recs  - c/w Escitalopram and Lurasidone    ===CVS===  #HTN #HLD   - Atorvastatin 20mg qhs  -post extubation concerns for Flash pulmonary edema with SBP 180s, received Hydralazine 10 x 2  -1/17 echo with Left ventricular systolic function is normal with an ejection fraction of 62 %.There are no regional wall motion abnormalities seen. Normal right ventricular cavity size and normal right ventricular systolic function.    ===PULM===  #s/p Intubation extubated 1/19 to AVAPS  #VAZQUEZ  - Oxygen by NC as needed  - AVAPS at night  - Secretions better today, still need suctioning q4-6hrs  - 1/17 CT chest with anterior bowing of the posterior tracheal wall, suggestive of tracheomalacia.  and trace right pleural effusion.    ===RENAL===  #Metabolic acidosis  #Lactic acidosis  #SHAGUFTA on CKD Stage III  #Elevated CK, downtrending 1000s to 300s  - Trending labs, monitor electrolytes, I/O , using condom cath    ===GI===  #Diet,NPO #OG  - Tube feeds, ok to crush pills pending SLP eval  - Formal SLP consulted - Pt failed official study today, s/p FEESST study  -ordered soft bite size diet with mildly thickened liquid  - last BM from 1/20    ===ID===  #Pneumonia  #Flu A positive  - Ceftriaxone 1g daily (1/17-1/21)  - Tamiflu 30mg BID (1/18-1/22)  - Follow cultures: BCx, Bronch (all neg to date)  - ID following    ===ENDO===  #At risk of hypoglycemia  #Hx of secondary hyperparathyroidism  - FS q6h while NPO    ===HEME/ONC===  #Metastatic testicular cancer, recent chemo 06/2024  #DVT ppx  - 7500U subQ heparin    ===ETHICS===  #Code Status: FULL CODE      For Followup:  - f/u Speech and swallow  - Neurology  - ID        Vital Signs Last 24 Hrs  T(C): 36.8 (21 Jan 2025 12:00), Max: 37.4 (20 Jan 2025 20:00)  T(F): 98.2 (21 Jan 2025 12:00), Max: 99.3 (20 Jan 2025 20:00)  HR: 100 (21 Jan 2025 14:03) (82 - 105)  BP: 133/69 (21 Jan 2025 14:03) (95/51 - 171/68)  BP(mean): 93 (21 Jan 2025 14:03) (70 - 114)  RR: 26 (21 Jan 2025 14:03) (18 - 26)  SpO2: 95% (21 Jan 2025 14:03) (91% - 99%)    Parameters below as of 21 Jan 2025 14:03  Patient On (Oxygen Delivery Method): nasal cannula      I&O's Summary    20 Jan 2025 07:01  -  21 Jan 2025 07:00  --------------------------------------------------------  IN: 415 mL / OUT: 1600 mL / NET: -1185 mL    21 Jan 2025 07:01  -  21 Jan 2025 14:48  --------------------------------------------------------  IN: 580 mL / OUT: 0 mL / NET: 580 mL        MEDICATIONS  (STANDING):  acetylcysteine 10%  Inhalation 4 milliLiter(s) Inhalation every 6 hours  albuterol/ipratropium for Nebulization 3 milliLiter(s) Nebulizer every 6 hours  atorvastatin 20 milliGRAM(s) Oral at bedtime  cefTRIAXone   IVPB 1000 milliGRAM(s) IV Intermittent every 24 hours  chlorhexidine 2% Cloths 1 Application(s) Topical <User Schedule>  escitalopram 15 milliGRAM(s) Oral daily  gabapentin Solution 300 milliGRAM(s) Oral two times a day  heparin   Injectable 7500 Unit(s) SubCutaneous every 12 hours  influenza  Vaccine (HIGH DOSE) 0.5 milliLiter(s) IntraMuscular once  lacosamide IVPB 150 milliGRAM(s) IV Intermittent every 12 hours  lamoTRIgine 100 milliGRAM(s) Oral two times a day  levETIRAcetam  IVPB 1500 milliGRAM(s) IV Intermittent two times a day  lidocaine   4% Patch 1 Patch Transdermal daily  lurasidone 40 milliGRAM(s) Oral daily  oseltamivir 30 milliGRAM(s) Oral two times a day    MEDICATIONS  (PRN):  acetaminophen   IVPB .. 1000 milliGRAM(s) IV Intermittent once PRN Temp greater or equal to 38C (100.4F), Mild Pain (1 - 3)  nystatin Powder 1 Application(s) Topical three times a day PRN fungal infection you may see and want to treat        LABS                                            10.6                  Neurophils% (auto):   x      (01-21 @ 00:43):    6.58 )-----------(103          Lymphocytes% (auto):  x                                             33.1                   Eosinphils% (auto):   x        Manual%: Neutrophils x    ; Lymphocytes x    ; Eosinophils x    ; Bands%: x    ; Blasts x                                    140    |  106    |  40                  Calcium: 8.8   / iCa: x      (01-21 @ 00:43)    ----------------------------<  123       Magnesium: 2.3                              4.0     |  19     |  2.53             Phosphorous: 4.0      TPro  5.9    /  Alb  3.1    /  TBili  0.2    /  DBili  x      /  AST  58     /  ALT  51     /  AlkPhos  103    21 Jan 2025 00:43    ( 01-21 @ 00:44 )   PT: 11.2 sec;   INR: 0.98 ratio  aPTT: 31.4 sec MICU Transfer Note    Transfer from: MICU    Transfer to: ( x ) Medicine    (  ) Telemetry     (   ) RCU        (    ) Palliative         (   ) Stroke Unit          (   ) __________________    Accepting Physician: Dr. Cornell    This is a 69M w/ hx of metastatic testicular cancer (s/p L orchiectomy, on etoposide/cisplatin chemo, last dose 6/2024), epilepsy (grand-mal seizures), schizophrenia, developmental delay, HTN, HLD, VAZQUEZ, stage III CKD, obesity, secondary hyperparathyroidism, anemia, thrombocytopenia, ? spinal surgery who was admitted on 1/16/25 for fall and syncope. On 1/17 patient had multiple RRTs called for grand mal seizure activity and  patient had x2 more episode and was ultimately given ativan 2mg, Vimpat load 200mg, and intubated for airway protection with MICU transfer. Extubated 1/19 - to AVAPS, post extubation with increased secretion, now better and Hemodynamically stable to downgrade to medicine floor with pulse oximetry.    ===NEURO===  #Status Epilepticus  #Grand Mal Seizures  #Schizophrenia  #Depression  - c/w AEDs: Keppra 1.5g BID, Lamotrigine 100mg BID, Gabapentin 300mg BID, Vimpat 150mg BID  - Ativan for seizures prn  -per Neuro MRI brain w and wo contrast to look for potential seizure foci that could cause increased frequency especially given hx of metastatic testicular cancer  - Deferred MRI given copious secretions   - CT head with no brain mets  - s/p vEEG, negative for seizures  - Neuro following, appreciate recs  - c/w Escitalopram and Lurasidone    ===CVS===  #HTN #HLD   - Atorvastatin 20mg qhs  -post extubation concerns for Flash pulmonary edema with SBP 180s, received Hydralazine 10 x 2  -1/17 echo with Left ventricular systolic function is normal with an ejection fraction of 62 %.There are no regional wall motion abnormalities seen. Normal right ventricular cavity size and normal right ventricular systolic function.    ===PULM===  #s/p Intubation extubated 1/19 to AVAPS  #VAZQUEZ  - Oxygen by NC as needed  - AVAPS at night  - Secretions better today, still need suctioning q4-6hrs  - 1/17 CT chest with anterior bowing of the posterior tracheal wall, suggestive of tracheomalacia.  and trace right pleural effusion.    ===RENAL===  #Metabolic acidosis  #Lactic acidosis  #SHAGUFTA on CKD Stage III  #Elevated CK, downtrending 1000s to 300s  - Trending labs, monitor electrolytes, I/O , using condom cath    ===GI===  #Diet,NPO #OG  - Tube feeds, ok to crush pills pending SLP eval  - Formal SLP consulted - Pt failed official study today, s/p FEESST study  -ordered soft bite size diet with mildly thickened liquid  - last BM from 1/20    ===ID===  #Pneumonia  #Flu A positive  - Ceftriaxone 1g daily (1/17-1/21)  - Tamiflu 30mg BID (1/18-1/22)  - Follow cultures: BCx, Bronch (all neg to date)  - ID following    ===ENDO===  #At risk of hypoglycemia  #Hx of secondary hyperparathyroidism  - FS q6h while NPO    ===HEME/ONC===  #Metastatic testicular cancer, recent chemo 06/2024  #DVT ppx  - 7500U subQ heparin    ===ETHICS===  #Code Status: FULL CODE      For Followup:  - f/u Speech and swallow  - Neurology  - ID        Vital Signs Last 24 Hrs  T(C): 36.8 (21 Jan 2025 12:00), Max: 37.4 (20 Jan 2025 20:00)  T(F): 98.2 (21 Jan 2025 12:00), Max: 99.3 (20 Jan 2025 20:00)  HR: 100 (21 Jan 2025 14:03) (82 - 105)  BP: 133/69 (21 Jan 2025 14:03) (95/51 - 171/68)  BP(mean): 93 (21 Jan 2025 14:03) (70 - 114)  RR: 26 (21 Jan 2025 14:03) (18 - 26)  SpO2: 95% (21 Jan 2025 14:03) (91% - 99%)    Parameters below as of 21 Jan 2025 14:03  Patient On (Oxygen Delivery Method): nasal cannula      I&O's Summary    20 Jan 2025 07:01  -  21 Jan 2025 07:00  --------------------------------------------------------  IN: 415 mL / OUT: 1600 mL / NET: -1185 mL    21 Jan 2025 07:01  -  21 Jan 2025 14:48  --------------------------------------------------------  IN: 580 mL / OUT: 0 mL / NET: 580 mL        MEDICATIONS  (STANDING):  acetylcysteine 10%  Inhalation 4 milliLiter(s) Inhalation every 6 hours  albuterol/ipratropium for Nebulization 3 milliLiter(s) Nebulizer every 6 hours  atorvastatin 20 milliGRAM(s) Oral at bedtime  cefTRIAXone   IVPB 1000 milliGRAM(s) IV Intermittent every 24 hours  chlorhexidine 2% Cloths 1 Application(s) Topical <User Schedule>  escitalopram 15 milliGRAM(s) Oral daily  gabapentin Solution 300 milliGRAM(s) Oral two times a day  heparin   Injectable 7500 Unit(s) SubCutaneous every 12 hours  influenza  Vaccine (HIGH DOSE) 0.5 milliLiter(s) IntraMuscular once  lacosamide IVPB 150 milliGRAM(s) IV Intermittent every 12 hours  lamoTRIgine 100 milliGRAM(s) Oral two times a day  levETIRAcetam  IVPB 1500 milliGRAM(s) IV Intermittent two times a day  lidocaine   4% Patch 1 Patch Transdermal daily  lurasidone 40 milliGRAM(s) Oral daily  oseltamivir 30 milliGRAM(s) Oral two times a day    MEDICATIONS  (PRN):  acetaminophen   IVPB .. 1000 milliGRAM(s) IV Intermittent once PRN Temp greater or equal to 38C (100.4F), Mild Pain (1 - 3)  nystatin Powder 1 Application(s) Topical three times a day PRN fungal infection you may see and want to treat        LABS                                            10.6                  Neurophils% (auto):   x      (01-21 @ 00:43):    6.58 )-----------(103          Lymphocytes% (auto):  x                                             33.1                   Eosinphils% (auto):   x        Manual%: Neutrophils x    ; Lymphocytes x    ; Eosinophils x    ; Bands%: x    ; Blasts x                                    140    |  106    |  40                  Calcium: 8.8   / iCa: x      (01-21 @ 00:43)    ----------------------------<  123       Magnesium: 2.3                              4.0     |  19     |  2.53             Phosphorous: 4.0      TPro  5.9    /  Alb  3.1    /  TBili  0.2    /  DBili  x      /  AST  58     /  ALT  51     /  AlkPhos  103    21 Jan 2025 00:43    ( 01-21 @ 00:44 )   PT: 11.2 sec;   INR: 0.98 ratio  aPTT: 31.4 sec MICU Transfer Note    Transfer from: MICU    Transfer to: ( x ) Medicine    (  ) Telemetry     (   ) RCU        (    ) Palliative         (   ) Stroke Unit          (   ) __________________    Accepting Physician: Dr. Cornell    This is a 69M w/ hx of metastatic testicular cancer (s/p L orchiectomy, on etoposide/cisplatin chemo, last dose 6/2024), epilepsy (grand-mal seizures), schizophrenia, developmental delay, HTN, HLD, VAZQUEZ, stage III CKD, obesity, secondary hyperparathyroidism, anemia, thrombocytopenia, ? spinal surgery who was admitted on 1/16/25 for fall and syncope. On 1/17 patient had multiple RRTs called for grand mal seizure activity and  patient had x2 more episode and was ultimately given ativan 2mg, Vimpat load 200mg, and intubated for airway protection with MICU transfer. Extubated 1/19 - to AVAPS, post extubation with increased secretion, now better and Hemodynamically stable to downgrade to medicine floor with pulse oximetry.    ===NEURO===  #Status Epilepticus  #Grand Mal Seizures  #Schizophrenia  #Depression  - c/w AEDs: Keppra 1.5g BID, Lamotrigine 100mg BID, Gabapentin 300mg BID, Vimpat 150mg BID  - Ativan for seizures prn  -per Neuro MRI brain w and wo contrast to look for potential seizure foci that could cause increased frequency especially given hx of metastatic testicular cancer  - Deferred MRI given copious secretions   - CT head with no brain mets  - s/p vEEG, negative for seizures  - Neuro following, appreciate recs  - c/w Escitalopram and Lurasidone    ===CVS===  #HTN #HLD   - Atorvastatin 20mg qhs  -post extubation concerns for Flash pulmonary edema with SBP 180s, received Hydralazine 10 x 2  -1/17 echo with Left ventricular systolic function is normal with an ejection fraction of 62 %.There are no regional wall motion abnormalities seen. Normal right ventricular cavity size and normal right ventricular systolic function.    ===PULM===  #s/p Intubation extubated 1/19 to AVAPS  #VAZQUEZ  - Oxygen by NC as needed  - AVAPS at night  - Secretions better today, still need suctioning q4-6hrs  - 1/17 CT chest with anterior bowing of the posterior tracheal wall, suggestive of tracheomalacia.  and trace right pleural effusion.    ===RENAL===  #Metabolic acidosis  #Lactic acidosis  #SHAGUFTA on CKD Stage III  #Elevated CK, downtrending 1000s to 300s  - Trending labs, monitor electrolytes, I/O , using condom cath    ===GI===  #Diet,NPO #OG  - Tube feeds, ok to crush pills pending SLP eval  - Formal SLP consulted - Pt failed official study today, s/p FEESST study  -ordered soft bite size diet with mildly thickened liquid  - last BM from 1/20    ===ID===  #Pneumonia  #Flu A positive  - Ceftriaxone 1g daily (1/17-1/21)  - Tamiflu 30mg BID (1/18-1/22)  - Follow cultures: BCx, Bronch (all neg to date)  - ID following    ===ENDO===  #At risk of hypoglycemia  #Hx of secondary hyperparathyroidism  - FS q6h while NPO    ===HEME/ONC===  #Metastatic testicular cancer, recent chemo 06/2024  #DVT ppx  - 7500U subQ heparin    ===ETHICS===  #Code Status: FULL CODE      For Followup:  - f/u Neurology recs  - f/u ID recs        Vital Signs Last 24 Hrs  T(C): 36.8 (21 Jan 2025 12:00), Max: 37.4 (20 Jan 2025 20:00)  T(F): 98.2 (21 Jan 2025 12:00), Max: 99.3 (20 Jan 2025 20:00)  HR: 100 (21 Jan 2025 14:03) (82 - 105)  BP: 133/69 (21 Jan 2025 14:03) (95/51 - 171/68)  BP(mean): 93 (21 Jan 2025 14:03) (70 - 114)  RR: 26 (21 Jan 2025 14:03) (18 - 26)  SpO2: 95% (21 Jan 2025 14:03) (91% - 99%)    Parameters below as of 21 Jan 2025 14:03  Patient On (Oxygen Delivery Method): nasal cannula      I&O's Summary    20 Jan 2025 07:01  -  21 Jan 2025 07:00  --------------------------------------------------------  IN: 415 mL / OUT: 1600 mL / NET: -1185 mL    21 Jan 2025 07:01  -  21 Jan 2025 14:48  --------------------------------------------------------  IN: 580 mL / OUT: 0 mL / NET: 580 mL        MEDICATIONS  (STANDING):  acetylcysteine 10%  Inhalation 4 milliLiter(s) Inhalation every 6 hours  albuterol/ipratropium for Nebulization 3 milliLiter(s) Nebulizer every 6 hours  atorvastatin 20 milliGRAM(s) Oral at bedtime  cefTRIAXone   IVPB 1000 milliGRAM(s) IV Intermittent every 24 hours  chlorhexidine 2% Cloths 1 Application(s) Topical <User Schedule>  escitalopram 15 milliGRAM(s) Oral daily  gabapentin Solution 300 milliGRAM(s) Oral two times a day  heparin   Injectable 7500 Unit(s) SubCutaneous every 12 hours  influenza  Vaccine (HIGH DOSE) 0.5 milliLiter(s) IntraMuscular once  lacosamide IVPB 150 milliGRAM(s) IV Intermittent every 12 hours  lamoTRIgine 100 milliGRAM(s) Oral two times a day  levETIRAcetam  IVPB 1500 milliGRAM(s) IV Intermittent two times a day  lidocaine   4% Patch 1 Patch Transdermal daily  lurasidone 40 milliGRAM(s) Oral daily  oseltamivir 30 milliGRAM(s) Oral two times a day    MEDICATIONS  (PRN):  acetaminophen   IVPB .. 1000 milliGRAM(s) IV Intermittent once PRN Temp greater or equal to 38C (100.4F), Mild Pain (1 - 3)  nystatin Powder 1 Application(s) Topical three times a day PRN fungal infection you may see and want to treat        LABS                                            10.6                  Neurophils% (auto):   x      (01-21 @ 00:43):    6.58 )-----------(103          Lymphocytes% (auto):  x                                             33.1                   Eosinphils% (auto):   x        Manual%: Neutrophils x    ; Lymphocytes x    ; Eosinophils x    ; Bands%: x    ; Blasts x                                    140    |  106    |  40                  Calcium: 8.8   / iCa: x      (01-21 @ 00:43)    ----------------------------<  123       Magnesium: 2.3                              4.0     |  19     |  2.53             Phosphorous: 4.0      TPro  5.9    /  Alb  3.1    /  TBili  0.2    /  DBili  x      /  AST  58     /  ALT  51     /  AlkPhos  103    21 Jan 2025 00:43    ( 01-21 @ 00:44 )   PT: 11.2 sec;   INR: 0.98 ratio  aPTT: 31.4 sec MICU Transfer Note    Transfer from: MICU    Transfer to: ( x ) Medicine    (  ) Telemetry     (   ) RCU        (    ) Palliative         (   ) Stroke Unit          (   ) __________________    Accepting Physician: Dr. Cornell    This is a 69M w/ hx of metastatic testicular cancer (s/p L orchiectomy, on etoposide/cisplatin chemo, last dose 6/2024), epilepsy (grand-mal seizures), schizophrenia, developmental delay, HTN, HLD, VAZQUEZ, stage III CKD, obesity, secondary hyperparathyroidism, anemia, thrombocytopenia, ? spinal surgery who was admitted on 1/16/25 for fall and syncope. On 1/17 patient had multiple RRTs called for grand mal seizure activity and  patient had x2 more episode and was ultimately given ativan 2mg, Vimpat load 200mg, and intubated for airway protection with MICU transfer. Extubated 1/19 - to AVAPS, post extubation with increased secretion, now better and Hemodynamically stable to downgrade to medicine floor with pulse oximetry.    ===NEURO===  #Status Epilepticus  #Grand Mal Seizures  #Schizophrenia  #Depression  - c/w AEDs: Keppra 1.5g BID, Lamotrigine 100mg BID, Gabapentin 300mg BID, Vimpat 150mg BID  - Ativan for seizures prn  -per Neuro MRI brain w and wo contrast to look for potential seizure foci that could cause increased frequency especially given hx of metastatic testicular cancer  - Deferred MRI given copious secretions   - CT head with no brain mets  - s/p vEEG, negative for seizures  - Neuro following, appreciate recs  - c/w Escitalopram and Lurasidone    ===CVS===  #HTN #HLD   - Atorvastatin 20mg qhs  -post extubation concerns for Flash pulmonary edema with SBP 180s, received Hydralazine 10 x 2  -1/17 echo with Left ventricular systolic function is normal with an ejection fraction of 62 %.There are no regional wall motion abnormalities seen. Normal right ventricular cavity size and normal right ventricular systolic function.    ===PULM===  #s/p Intubation extubated 1/19 to AVAPS  #VAZQUEZ  - Oxygen by NC as needed  - AVAPS at night  - Secretions better today, still need suctioning q4-6hrs  - 1/17 CT chest with anterior bowing of the posterior tracheal wall, suggestive of tracheomalacia.  and trace right pleural effusion.    ===RENAL===  #Metabolic acidosis  #Lactic acidosis  #SHAGUFTA on CKD Stage III  #Elevated CK, downtrending 1000s to 300s  - Trending labs, monitor electrolytes, I/O , using condom cath    ===GI===  #Diet,NPO #OG  - Tube feeds, ok to crush pills pending SLP eval  - Formal SLP consulted - Pt failed official study today, s/p FEESST study  -ordered soft bite size diet with mildly thickened liquid  - last BM from 1/20    ===ID===  #Pneumonia  #Flu A positive  - Ceftriaxone 1g daily (1/17-1/21)  - Tamiflu 30mg BID (1/18-1/21)  - Follow cultures: BCx, Bronch (all neg to date)  - ID following    ===ENDO===  #At risk of hypoglycemia  #Hx of secondary hyperparathyroidism  - FS q6h while NPO    ===HEME/ONC===  #Metastatic testicular cancer, recent chemo 06/2024  #DVT ppx  - 7500U subQ heparin    ===ETHICS===  #Code Status: FULL CODE      For Followup:  - f/u Neurology recs  - f/u ID recs        Vital Signs Last 24 Hrs  T(C): 36.8 (21 Jan 2025 12:00), Max: 37.4 (20 Jan 2025 20:00)  T(F): 98.2 (21 Jan 2025 12:00), Max: 99.3 (20 Jan 2025 20:00)  HR: 100 (21 Jan 2025 14:03) (82 - 105)  BP: 133/69 (21 Jan 2025 14:03) (95/51 - 171/68)  BP(mean): 93 (21 Jan 2025 14:03) (70 - 114)  RR: 26 (21 Jan 2025 14:03) (18 - 26)  SpO2: 95% (21 Jan 2025 14:03) (91% - 99%)    Parameters below as of 21 Jan 2025 14:03  Patient On (Oxygen Delivery Method): nasal cannula      I&O's Summary    20 Jan 2025 07:01  -  21 Jan 2025 07:00  --------------------------------------------------------  IN: 415 mL / OUT: 1600 mL / NET: -1185 mL    21 Jan 2025 07:01  -  21 Jan 2025 14:48  --------------------------------------------------------  IN: 580 mL / OUT: 0 mL / NET: 580 mL        MEDICATIONS  (STANDING):  acetylcysteine 10%  Inhalation 4 milliLiter(s) Inhalation every 6 hours  albuterol/ipratropium for Nebulization 3 milliLiter(s) Nebulizer every 6 hours  atorvastatin 20 milliGRAM(s) Oral at bedtime  cefTRIAXone   IVPB 1000 milliGRAM(s) IV Intermittent every 24 hours  chlorhexidine 2% Cloths 1 Application(s) Topical <User Schedule>  escitalopram 15 milliGRAM(s) Oral daily  gabapentin Solution 300 milliGRAM(s) Oral two times a day  heparin   Injectable 7500 Unit(s) SubCutaneous every 12 hours  influenza  Vaccine (HIGH DOSE) 0.5 milliLiter(s) IntraMuscular once  lacosamide IVPB 150 milliGRAM(s) IV Intermittent every 12 hours  lamoTRIgine 100 milliGRAM(s) Oral two times a day  levETIRAcetam  IVPB 1500 milliGRAM(s) IV Intermittent two times a day  lidocaine   4% Patch 1 Patch Transdermal daily  lurasidone 40 milliGRAM(s) Oral daily  oseltamivir 30 milliGRAM(s) Oral two times a day    MEDICATIONS  (PRN):  acetaminophen   IVPB .. 1000 milliGRAM(s) IV Intermittent once PRN Temp greater or equal to 38C (100.4F), Mild Pain (1 - 3)  nystatin Powder 1 Application(s) Topical three times a day PRN fungal infection you may see and want to treat        LABS                                            10.6                  Neurophils% (auto):   x      (01-21 @ 00:43):    6.58 )-----------(103          Lymphocytes% (auto):  x                                             33.1                   Eosinphils% (auto):   x        Manual%: Neutrophils x    ; Lymphocytes x    ; Eosinophils x    ; Bands%: x    ; Blasts x                                    140    |  106    |  40                  Calcium: 8.8   / iCa: x      (01-21 @ 00:43)    ----------------------------<  123       Magnesium: 2.3                              4.0     |  19     |  2.53             Phosphorous: 4.0      TPro  5.9    /  Alb  3.1    /  TBili  0.2    /  DBili  x      /  AST  58     /  ALT  51     /  AlkPhos  103    21 Jan 2025 00:43    ( 01-21 @ 00:44 )   PT: 11.2 sec;   INR: 0.98 ratio  aPTT: 31.4 sec

## 2025-01-21 NOTE — DIETITIAN INITIAL EVALUATION ADULT - PROBLEM SELECTOR PLAN 9
-- DO NOT REPLY / DO NOT REPLY ALL --  -- Message is from the [facility]--    COVID-19 Universal Screening: N/A - Not about scheduling    General Patient Message      Reason for Call: patient would like to know if she needs to fast for her lab draw which is scheduled 1/28    Caller Information       Type Contact Phone    01/13/2021 10:54 AM CST Phone (Incoming) Vicki Cecilia SANDOR (Self) [phone] (M)          Alternative phone number: [phone]  Turnaround time given to caller:   \"This message will be sent to [state Provider's name]. The clinical team will fulfill your request as soon as they review your message.\"     - Hx of metastatic testicular cancer, s/p L orchiectomy, on etoposide/cisplatin chemo, last dose on 6/21/24  - Note clear recent PET scan  - outpt Oncology follow-up.

## 2025-01-21 NOTE — SWALLOW BEDSIDE ASSESSMENT ADULT - COMMENTS
1/17 CT chest with anterior bowing of the posterior tracheal wall, suggestive of tracheomalacia; trace right pleural effusion.    1/19: extubated to AVAPS; post extubation with copious secretions  1/21: weaned to NC; AVAPS at night; per RN, pt able to expectorate; earlier today chest PT done and nasopharyngeal suctioning required 1/17 CT chest with anterior bowing of the posterior tracheal wall, suggestive of tracheomalacia; trace right pleural effusion.    1/19: extubated to AVAPS; post extubation with copious secretions  1/21: weaned to NC; AVAPS at night; per RN, pt able to expectorate; earlier today chest PT done and nasopharyngeal suctioning required    Started on ceftriaxone for possible aspiration, started on tamiflu 1/18

## 2025-01-21 NOTE — DIETITIAN INITIAL EVALUATION ADULT - REASON INDICATOR FOR ASSESSMENT
ICU Length of Stay  Information obtained from: Review of pt's current medical record, interview with pt in his assigned room on MICU

## 2025-01-21 NOTE — DIETITIAN INITIAL EVALUATION ADULT - PROBLEM SELECTOR PLAN 3
- WIth some R sided chest pressure on arrival to ED, now resolved  - Trop trend 34 - 96 - 87  - EKG NSR  - Cardiology consulted, pending TTE and ischemic assessment post TTE

## 2025-01-21 NOTE — DIETITIAN INITIAL EVALUATION ADULT - PHYSCIAL ASSESSMENT
Weights:  - Source: Patient  - UBW: 219-221 pounds   - Reported weight changes: Denies any weight changes    Current Admission Weights:  - Dosing weight:  103.2 kg/ 227.6 pounds  (1/17/25)  - Daily weight: 101.3 kg/ 223.3 pounds (1/21/25),  105.2 kg/232 pounds  (1/19/25)      Weight History per [facility]:  - 104.8 kg/ 231.1 pounds (7/11/24)    Weight Change:  - No significant  weight changes noted X  6 months; weight gain noted from pt's reported UBW, likely in setting of fluid gains with edema. Will continue to monitor weight status as able      IBW: 142 pounds   %IBW: 157%

## 2025-01-21 NOTE — PROGRESS NOTE ADULT - SUBJECTIVE AND OBJECTIVE BOX
[facility]  JACINTO Horton Y. Patel, S. Shah, G. Casimir  [phone]  ([phone] - weekdays after 5pm and weekends)    Name: OSCAR MILAN  Age/Gender: 69y Male  MRN: [id number]    Interval History:  Patient seen and examined this morning.   Patient was on RA, saturating 93%.   Patient with no new complaints.   Notes reviewed  No concerning overnight events  Afebrile. D/w RN at bedside  Allergies: penicillin (Hives)  seasonal allergies (Unknown)      Objective:  Vitals:   T(F): 99 (01-21-25 @ 08:00), Max: 99.3 (01-20-25 @ 20:00)  HR: 89 (01-21-25 @ 08:10) (82 - 105)  BP: 160/77 (01-21-25 @ 08:10) (95/51 - 171/68)  RR: 20 (01-21-25 @ 08:10) (18 - 27)  SpO2: 96% (01-21-25 @ 08:10) (91% - 99%)  Physical Examination:  General: no acute distress, RA, obese  HEENT: normocephalic, anicteric, EOMI  Respiratory: coarse breath sounds   Cardiovascular: S1 and S2 present, normal rate   Gastrointestinal: soft, nontender, nondistended  Extremities: no edema, no cyanosis  Skin: no visible rash, R brow wound     Laboratory Studies:  CBC:                       10.6   6.58  )-----------( 103      ( 21 Jan 2025 00:43 )             33.1     WBC Trend:  6.58 01-21-25 @ 00:43  10.49 01-20-25 @ 00:49  9.51 01-18-25 @ 23:35  9.51 01-18-25 @ 15:08  17.77 01-17-25 @ 22:20  13.82 01-17-25 @ 19:13  6.72 01-17-25 @ 07:02  8.38 01-16-25 @ 07:30    CMP: 01-21    140  |  106  |  40[H]  ----------------------------<  123[H]  4.0   |  19[L]  |  2.53[H]    Ca    8.8      21 Jan 2025 00:43  Phos  4.0     01-21  Mg     2.3     01-21    TPro  5.9[L]  /  Alb  3.1[L]  /  TBili  0.2  /  DBili  x   /  AST  58[H]  /  ALT  51[H]  /  AlkPhos  103  01-21    Creatinine: 2.53 mg/dL (01-21-25 @ 00:43)  Creatinine: 2.49 mg/dL (01-20-25 @ 00:50)  Creatinine: 2.57 mg/dL (01-19-25 @ 19:32)  Creatinine: 3.14 mg/dL (01-18-25 @ 23:35)  Creatinine: 3.25 mg/dL (01-18-25 @ 15:08)  Creatinine: 2.56 mg/dL (01-17-25 @ 22:46)  Creatinine: 2.55 mg/dL (01-17-25 @ 19:13)  Creatinine: 2.27 mg/dL (01-17-25 @ 12:49)  Creatinine: 2.79 mg/dL (01-16-25 @ 07:30)    LIVER FUNCTIONS - ( 21 Jan 2025 00:43 )  Alb: 3.1 g/dL / Pro: 5.9 g/dL / ALK PHOS: 103 U/L / ALT: 51 U/L / AST: 58 U/L / GGT: x           Microbiology: reviewed   Culture - Blood (collected 01-20-25 @ 01:52)  Source: .Blood BLOOD  Preliminary Report (01-21-25 @ 09:02):    No growth at 24 hours    Culture - Blood (collected 01-19-25 @ 19:28)  Source: .Blood BLOOD  Preliminary Report (01-21-25 @ 04:01):    No growth at 24 hours    Culture - Sputum (collected 01-18-25 @ 00:36)  Source: .Sputum Sputum  Gram Stain (01-18-25 @ 06:52):    Moderate polymorphonuclear leukocytes per low power field    No Squamous epithelial cells per low power field    No organisms seen per oil power field  Final Report (01-19-25 @ 08:17):    No growth    Culture - Blood (collected 01-17-25 @ 15:10)  Source: .Blood BLOOD  Preliminary Report (01-20-25 @ 20:01):    No growth at 72 Hours    Culture - Blood (collected 01-17-25 @ 14:51)  Source: .Blood BLOOD  Preliminary Report (01-20-25 @ 20:01):    No growth at 72 Hours    Radiology: reviewed     Medications:  acetaminophen   IVPB .. 1000 milliGRAM(s) IV Intermittent once PRN  acetylcysteine 10%  Inhalation 4 milliLiter(s) Inhalation every 6 hours  albuterol/ipratropium for Nebulization 3 milliLiter(s) Nebulizer every 6 hours  atorvastatin 20 milliGRAM(s) Oral at bedtime  cefTRIAXone   IVPB 1000 milliGRAM(s) IV Intermittent every 24 hours  chlorhexidine 2% Cloths 1 Application(s) Topical <User Schedule>  escitalopram 15 milliGRAM(s) Oral daily  gabapentin Solution 300 milliGRAM(s) Oral two times a day  heparin   Injectable 7500 Unit(s) SubCutaneous every 12 hours  influenza  Vaccine (HIGH DOSE) 0.5 milliLiter(s) IntraMuscular once  lacosamide IVPB 150 milliGRAM(s) IV Intermittent every 12 hours  lamoTRIgine 100 milliGRAM(s) Oral two times a day  levETIRAcetam  IVPB 1500 milliGRAM(s) IV Intermittent two times a day  lidocaine   4% Patch 1 Patch Transdermal daily  nystatin Powder 1 Application(s) Topical three times a day PRN  oseltamivir 30 milliGRAM(s) Oral two times a day    Current Antimicrobials:  cefTRIAXone   IVPB 1000 milliGRAM(s) IV Intermittent every 24 hours  oseltamivir 30 milliGRAM(s) Oral two times a day    Prior/Completed Antimicrobials:  cefTRIAXone   IVPB  cefTRIAXone   IVPB  vancomycin  IVPB

## 2025-01-21 NOTE — SWALLOW FEES ASSESSMENT ADULT - LARYNGEAL PENETRATION DURING SWALLOW - SILENT
over laryngeal surface of the epiglottis; retrieved/Trace inconsistent over laryngeal surface of the epiglottis; retrieved/Trace over laryngeal surface of the epiglottis; incomplete retrieval/Trace/Mild

## 2025-01-21 NOTE — PROGRESS NOTE ADULT - ASSESSMENT
Patient is a 69 year old male with PMH of metastatic testicular cancer (s/p L orchiectomy, on etoposide/cisplatin chemo, last dose 6/2024), epilepsy (grand-mal seizures), schizophrenia, developmental delay, HTN, HLD, VAZQUEZ, stage III CKD, severe obesity, secondary hyperparathyroidism, anemia, thrombocytopenia who presented to ED for fall, possible seizure prior.   Admitted for further work up for syncope, c/f seizure   Influenza A  - tested FluA positive on admission  - afebrile, WBC wnl, saturating well on RA  - reports cough for few weeks, no other resp sx  - CXR with clear lungs, no pneumonia   1/17 s/p RRTs for grand mal seizure activity, s/p intubation and transfer to MICU  - 1/17 CT chest with anterior bowing of posterior tracheal wall suggestive of tracheomalacia, trace R pleural effusion   - started on ceftriaxone for possible aspiration on 1/17, started on tamiflu 1/18   - MRSA PCR screen negative    - sputum culture negative    - Bcx NGTD x2    - s/p extubation 1/19, on RA this am    Recommendations:   Continue ceftriaxone to complete 5d course on 1/21  Continue tamiflu to complete 5d on 1/22   Supportive care  Isolation per IC protocol  Neurology following  Cardiology following   Continue rest of care per primary team       Greyson Mart M.D.  [facility]  Available on [employer] TEAMS - *PREFERRED*  [phone]  After 5pm on weekdays and all day on weekends - please call [phone]     Thank you for consulting us and involving us in the management of this patients case. In addition to reviewing history, imaging, documents, labs, microbiology, took into account antibiotic stewardship, local antibiogram and infection control strategies and potential transmission issues.  Patient is a 69 year old male with PMH of metastatic testicular cancer (s/p L orchiectomy, on etoposide/cisplatin chemo, last dose 6/2024), epilepsy (grand-mal seizures), schizophrenia, developmental delay, HTN, HLD, VAZQUEZ, stage III CKD, severe obesity, secondary hyperparathyroidism, anemia, thrombocytopenia who presented to ED for fall, possible seizure prior.   Admitted for further work up for syncope, c/f seizure   Influenza A  - tested FluA positive on admission  - afebrile, WBC wnl, saturating well on RA  - reports cough for few weeks, no other resp sx  - CXR with clear lungs, no pneumonia   1/17 s/p RRTs for grand mal seizure activity, s/p intubation and transfer to MICU  - 1/17 CT chest with anterior bowing of posterior tracheal wall suggestive of tracheomalacia, trace R pleural effusion   - started on ceftriaxone for possible aspiration on 1/17, started on tamiflu 1/18   - MRSA PCR screen negative    - sputum culture negative    - Bcx NGTD x2    - s/p extubation 1/19  - had low grade temp 1/20 overnight, now afebrile, WBC wnl, on RA    Recommendations:   Follow 1/20 Bcx - NGTD x2   Continue ceftriaxone to complete 5d course on 1/21  Continue tamiflu to complete 5d on 1/22   Supportive care  Isolation per IC protocol  Neurology following  Cardiology following   Continue rest of care per primary team       Greyson Mart M.D.  [facility]  Available on [employer] TEAMS - *PREFERRED*  [phone]  After 5pm on weekdays and all day on weekends - please call [phone]     Thank you for consulting us and involving us in the management of this patients case. In addition to reviewing history, imaging, documents, labs, microbiology, took into account antibiotic stewardship, local antibiogram and infection control strategies and potential transmission issues.

## 2025-01-21 NOTE — DIETITIAN INITIAL EVALUATION ADULT - ORAL INTAKE PTA/DIET HISTORY
Pt confirms no known food allergies/intolerances. Reports having a good appetite and PO intakes at home. Did not follow any specific dietary restrictions. Pt denies nausea, vomiting, diarrhea, or constipation. Denies difficulty chewing/swallowing, noted with missing teeth, however pt states he has dentures that he normally wears. Denies any vitamin/mineral use at home, pt reports not taking oral nutritional supplement at home as well.

## 2025-01-21 NOTE — SWALLOW BEDSIDE ASSESSMENT ADULT - SWALLOW EVAL: DIAGNOSIS
68 yo M pmhx metastatic testicular ca. , epilepsy (grand-mal seizures), schizophrenia, developmental delay, admitted s/p syncope and fall, subsequent seizures, intubated 1/17-19; suctioning of copious secretions post extubation. Patient presents on bedside swallow evaluation with adequate oral management across consistencies trialed. Delayed cough c/f aspiration. Baseline cough also confounds exam. FEES recommended.

## 2025-01-21 NOTE — PROGRESS NOTE ADULT - SUBJECTIVE AND OBJECTIVE BOX
CHIEF COMPLAINT: 69M w/ hx of metastatic testicular cancer (s/p L orchiectomy, on etoposide/cisplatin chemo, last dose 6/2024), epilepsy (grand-mal seizures), schizophrenia, developmental delay, HTN, HLD, VAZQUEZ, stage III CKD, obesity, secondary hyperparathyroidism, anemia, thrombocytopenia who was admitted on 1/16/25 for fall and syncope. On 1/17 patient had multiple RRTs called for grand mal seizure activity and was initially recommended sepsis workup and antipyretics given T103F, but patient had x2 more episode and was ultimately given ativan 2mg, vimpat load 200mg, and intubated for airway protection with MICU transfer.     Interval Events: Extubated yesterday, Per neuro: could d/c eeg, continue with vimpat, keppra and to consider MRI for new foci. On exam, patient felt improved compared to yesterday, but still feeling congested and mildly wheezy. No pain or other concerns. On NC and felt comfortable. Per nursing, secretions are requiring deep suctioning q3h, so not ready for floor.     REVIEW OF SYSTEMS: Negative except as above.     OBJECTIVE:  ICU Vital Signs Last 24 Hrs  T(C): 36.7 (20 Jan 2025 04:00), Max: 38.7 (19 Jan 2025 20:00)  T(F): 98 (20 Jan 2025 04:00), Max: 101.7 (19 Jan 2025 20:00)  HR: 111 (20 Jan 2025 07:00) (58 - 125)  BP: 128/72 (20 Jan 2025 07:00) (116/69 - 210/84)  BP(mean): 96 (20 Jan 2025 07:00) (84 - 124)  ABP: --  ABP(mean): --  RR: 20 (20 Jan 2025 07:00) (17 - 40)  SpO2: 93% (20 Jan 2025 07:00) (92% - 99%)    O2 Parameters below as of 20 Jan 2025 05:30  Patient On (Oxygen Delivery Method): nasal cannula    PHYSICAL EXAM  General: Nontoxic, no distress, watching TV   HEENT: Normocephalic. Normal icterus. R forehead bruise. MMM.  Heart: Regular rate, rhythm. Normal S1/S2. No murmurs. Distant sounds.   Lungs: Wheezing/coarse throughout, aeration throughout, no distress  GI: Soft, obese, nontender  Neuro: Alert, oriented. No obvious focal deficits.   Ext: Peripheral pulses intact. No lower extremity edema. R foot warmer than L foot as prior.   Skin: Warm.     01-19 @ 07:01  -  01-20 @ 07:00  --------------------------------------------------------  IN: 1743 mL / OUT: 3715 mL / NET: -1972 mL    Mode: CPAP with PS, FiO2: 30, PEEP: 5, PS: 5, MAP: 10, PIP: 11  Drips  CAPILLARY BLOOD GLUCOSE    POCT Blood Glucose.: 147 mg/dL (19 Jan 2025 06:05)    LABS:                          10.6   6.58  )-----------( 103      ( 21 Jan 2025 00:43 )             33.1     Hgb Trend: 10.6<--, 10.9<--, 10.4<--, 10.7<--, 12.0<--  01-21    140  |  106  |  40[H]  ----------------------------<  123[H]  4.0   |  19[L]  |  2.53[H]    Ca    8.8      21 Jan 2025 00:43  Phos  4.0     01-21  Mg     2.3     01-21    TPro  5.9[L]  /  Alb  3.1[L]  /  TBili  0.2  /  DBili  x   /  AST  58[H]  /  ALT  51[H]  /  AlkPhos  103  01-21    Creatinine Trend: 2.53<--, 2.49<--, 2.57<--, 3.14<--, 3.25<--, 2.56<--  PT/INR - ( 21 Jan 2025 00:44 )   PT: 11.2 sec;   INR: 0.98 ratio         PTT - ( 21 Jan 2025 00:44 )  PTT:31.4 sec      Urinalysis Basic - ( 21 Jan 2025 00:43 )    Color: x / Appearance: x / SG: x / pH: x  Gluc: 123 mg/dL / Ketone: x  / Bili: x / Urobili: x   Blood: x / Protein: x / Nitrite: x   Leuk Esterase: x / RBC: x / WBC x   Sq Epi: x / Non Sq Epi: x / Bacteria: x      HOSPITAL MEDICATIONS:  MEDICATIONS  (STANDING):  albuterol/ipratropium for Nebulization 3 milliLiter(s) Nebulizer every 6 hours  atorvastatin 20 milliGRAM(s) Oral at bedtime  cefTRIAXone   IVPB 1000 milliGRAM(s) IV Intermittent every 24 hours  chlorhexidine 2% Cloths 1 Application(s) Topical <User Schedule>  escitalopram 15 milliGRAM(s) Oral daily  gabapentin Solution 300 milliGRAM(s) Oral two times a day  heparin   Injectable 7500 Unit(s) SubCutaneous every 12 hours  influenza  Vaccine (HIGH DOSE) 0.5 milliLiter(s) IntraMuscular once  lacosamide IVPB 150 milliGRAM(s) IV Intermittent every 12 hours  lamoTRIgine 100 milliGRAM(s) Oral two times a day  levETIRAcetam  IVPB 1500 milliGRAM(s) IV Intermittent two times a day  lidocaine   4% Patch 1 Patch Transdermal daily  oseltamivir 30 milliGRAM(s) Oral two times a day  sodium chloride 3%  Inhalation 4 milliLiter(s) Inhalation every 6 hours    MEDICATIONS  (PRN):    RADIOLOGY:  X Ray:  CT:  MRI:  Ultrasound:  [ ] Reviewed and interpreted by me    EKG:   CHIEF COMPLAINT: 69M w/ hx of metastatic testicular cancer (s/p L orchiectomy, on etoposide/cisplatin chemo, last dose 6/2024), epilepsy (grand-mal seizures), schizophrenia, developmental delay, HTN, HLD, VAZQUEZ, stage III CKD, obesity, secondary hyperparathyroidism, anemia, thrombocytopenia who was admitted on 1/16/25 for fall and syncope. On 1/17 patient had multiple RRTs called for grand mal seizure activity and was initially recommended sepsis workup and antipyretics given T103F, but patient had x2 more episode and was ultimately given ativan 2mg, vimpat load 200mg, and intubated for airway protection with MICU transfer.     Interval Events: Extubated yesterday 1/20, secretions are better today    ROS: Negative except as above.     OBJECTIVE:  ICU Vital Signs Last 24 Hrs  T(C): 36.7 (20 Jan 2025 04:00), Max: 38.7 (19 Jan 2025 20:00)  T(F): 98 (20 Jan 2025 04:00), Max: 101.7 (19 Jan 2025 20:00)  HR: 111 (20 Jan 2025 07:00) (58 - 125)  BP: 128/72 (20 Jan 2025 07:00) (116/69 - 210/84)  BP(mean): 96 (20 Jan 2025 07:00) (84 - 124)  ABP: --  ABP(mean): --  RR: 20 (20 Jan 2025 07:00) (17 - 40)  SpO2: 93% (20 Jan 2025 07:00) (92% - 99%)    O2 Parameters below as of 20 Jan 2025 05:30  Patient On (Oxygen Delivery Method): nasal cannula    PHYSICAL EXAM  General: Nontoxic, no distress, watching TV   HEENT: Normocephalic. Normal icterus. R forehead bruise. MMM.  Heart: Regular rate, rhythm. Normal S1/S2. No murmurs. Distant sounds.   Lungs: Wheezing/coarse throughout, aeration throughout, no distress  GI: Soft, obese, nontender  Neuro: Alert, oriented. No obvious focal deficits.   Ext: Peripheral pulses intact. No lower extremity edema. R foot warmer than L foot as prior.   Skin: Warm.     01-19 @ 07:01  -  01-20 @ 07:00  --------------------------------------------------------  IN: 1743 mL / OUT: 3715 mL / NET: -1972 mL    Mode: CPAP with PS, FiO2: 30, PEEP: 5, PS: 5, MAP: 10, PIP: 11  Drips  CAPILLARY BLOOD GLUCOSE    POCT Blood Glucose.: 147 mg/dL (19 Jan 2025 06:05)    LABS:                          10.6   6.58  )-----------( 103      ( 21 Jan 2025 00:43 )             33.1     Hgb Trend: 10.6<--, 10.9<--, 10.4<--, 10.7<--, 12.0<--  01-21    140  |  106  |  40[H]  ----------------------------<  123[H]  4.0   |  19[L]  |  2.53[H]    Ca    8.8      21 Jan 2025 00:43  Phos  4.0     01-21  Mg     2.3     01-21    TPro  5.9[L]  /  Alb  3.1[L]  /  TBili  0.2  /  DBili  x   /  AST  58[H]  /  ALT  51[H]  /  AlkPhos  103  01-21    Creatinine Trend: 2.53<--, 2.49<--, 2.57<--, 3.14<--, 3.25<--, 2.56<--  PT/INR - ( 21 Jan 2025 00:44 )   PT: 11.2 sec;   INR: 0.98 ratio         PTT - ( 21 Jan 2025 00:44 )  PTT:31.4 sec      Urinalysis Basic - ( 21 Jan 2025 00:43 )    Color: x / Appearance: x / SG: x / pH: x  Gluc: 123 mg/dL / Ketone: x  / Bili: x / Urobili: x   Blood: x / Protein: x / Nitrite: x   Leuk Esterase: x / RBC: x / WBC x   Sq Epi: x / Non Sq Epi: x / Bacteria: x      HOSPITAL MEDICATIONS:  MEDICATIONS  (STANDING):  albuterol/ipratropium for Nebulization 3 milliLiter(s) Nebulizer every 6 hours  atorvastatin 20 milliGRAM(s) Oral at bedtime  cefTRIAXone   IVPB 1000 milliGRAM(s) IV Intermittent every 24 hours  chlorhexidine 2% Cloths 1 Application(s) Topical <User Schedule>  escitalopram 15 milliGRAM(s) Oral daily  gabapentin Solution 300 milliGRAM(s) Oral two times a day  heparin   Injectable 7500 Unit(s) SubCutaneous every 12 hours  influenza  Vaccine (HIGH DOSE) 0.5 milliLiter(s) IntraMuscular once  lacosamide IVPB 150 milliGRAM(s) IV Intermittent every 12 hours  lamoTRIgine 100 milliGRAM(s) Oral two times a day  levETIRAcetam  IVPB 1500 milliGRAM(s) IV Intermittent two times a day  lidocaine   4% Patch 1 Patch Transdermal daily  oseltamivir 30 milliGRAM(s) Oral two times a day  sodium chloride 3%  Inhalation 4 milliLiter(s) Inhalation every 6 hours    MEDICATIONS  (PRN):    RADIOLOGY:  X Ray:  CT:  MRI:  Ultrasound:  [ ] Reviewed and interpreted by me    EKG:   CHIEF COMPLAINT: 69M w/ hx of metastatic testicular cancer (s/p L orchiectomy, on etoposide/cisplatin chemo, last dose 6/2024), epilepsy (grand-mal seizures), schizophrenia, developmental delay, HTN, HLD, VAZQUEZ, stage III CKD, obesity, secondary hyperparathyroidism, anemia, thrombocytopenia, ? spinal surgery who was admitted on 1/16/25 for fall and syncope. On 1/17 patient had multiple RRTs called for grand mal seizure activity and was initially recommended sepsis workup and antipyretics given T103F, but patient had x2 more episode and was ultimately given ativan 2mg, vimpat load 200mg, and intubated for airway protection with MICU transfer.     Interval Events: Extubated yesterday 1/20, secretions are better today    ROS: Negative except as above.     OBJECTIVE:  ICU Vital Signs Last 24 Hrs  T(C): 36.7 (20 Jan 2025 04:00), Max: 38.7 (19 Jan 2025 20:00)  T(F): 98 (20 Jan 2025 04:00), Max: 101.7 (19 Jan 2025 20:00)  HR: 111 (20 Jan 2025 07:00) (58 - 125)  BP: 128/72 (20 Jan 2025 07:00) (116/69 - 210/84)  BP(mean): 96 (20 Jan 2025 07:00) (84 - 124)  ABP: --  ABP(mean): --  RR: 20 (20 Jan 2025 07:00) (17 - 40)  SpO2: 93% (20 Jan 2025 07:00) (92% - 99%)    O2 Parameters below as of 20 Jan 2025 05:30  Patient On (Oxygen Delivery Method): nasal cannula    PHYSICAL EXAM  General: Nontoxic, no distress, watching TV   HEENT: Normocephalic. Normal icterus. R forehead bruise. MMM.  Heart: Regular rate, rhythm. Normal S1/S2. No murmurs. Distant sounds.   Lungs: Wheezing/coarse throughout, aeration throughout, no distress  GI: Soft, obese, nontender  Neuro: Alert, oriented. No obvious focal deficits.   Ext: Peripheral pulses intact. No lower extremity edema. R foot warmer than L foot as prior.   Skin: Warm.     01-19 @ 07:01  -  01-20 @ 07:00  --------------------------------------------------------  IN: 1743 mL / OUT: 3715 mL / NET: -1972 mL    Mode: CPAP with PS, FiO2: 30, PEEP: 5, PS: 5, MAP: 10, PIP: 11  Drips  CAPILLARY BLOOD GLUCOSE    POCT Blood Glucose.: 147 mg/dL (19 Jan 2025 06:05)    LABS:                          10.6   6.58  )-----------( 103      ( 21 Jan 2025 00:43 )             33.1     Hgb Trend: 10.6<--, 10.9<--, 10.4<--, 10.7<--, 12.0<--  01-21    140  |  106  |  40[H]  ----------------------------<  123[H]  4.0   |  19[L]  |  2.53[H]    Ca    8.8      21 Jan 2025 00:43  Phos  4.0     01-21  Mg     2.3     01-21    TPro  5.9[L]  /  Alb  3.1[L]  /  TBili  0.2  /  DBili  x   /  AST  58[H]  /  ALT  51[H]  /  AlkPhos  103  01-21    Creatinine Trend: 2.53<--, 2.49<--, 2.57<--, 3.14<--, 3.25<--, 2.56<--  PT/INR - ( 21 Jan 2025 00:44 )   PT: 11.2 sec;   INR: 0.98 ratio         PTT - ( 21 Jan 2025 00:44 )  PTT:31.4 sec      Urinalysis Basic - ( 21 Jan 2025 00:43 )    Color: x / Appearance: x / SG: x / pH: x  Gluc: 123 mg/dL / Ketone: x  / Bili: x / Urobili: x   Blood: x / Protein: x / Nitrite: x   Leuk Esterase: x / RBC: x / WBC x   Sq Epi: x / Non Sq Epi: x / Bacteria: x      HOSPITAL MEDICATIONS:  MEDICATIONS  (STANDING):  albuterol/ipratropium for Nebulization 3 milliLiter(s) Nebulizer every 6 hours  atorvastatin 20 milliGRAM(s) Oral at bedtime  cefTRIAXone   IVPB 1000 milliGRAM(s) IV Intermittent every 24 hours  chlorhexidine 2% Cloths 1 Application(s) Topical <User Schedule>  escitalopram 15 milliGRAM(s) Oral daily  gabapentin Solution 300 milliGRAM(s) Oral two times a day  heparin   Injectable 7500 Unit(s) SubCutaneous every 12 hours  influenza  Vaccine (HIGH DOSE) 0.5 milliLiter(s) IntraMuscular once  lacosamide IVPB 150 milliGRAM(s) IV Intermittent every 12 hours  lamoTRIgine 100 milliGRAM(s) Oral two times a day  levETIRAcetam  IVPB 1500 milliGRAM(s) IV Intermittent two times a day  lidocaine   4% Patch 1 Patch Transdermal daily  oseltamivir 30 milliGRAM(s) Oral two times a day  sodium chloride 3%  Inhalation 4 milliLiter(s) Inhalation every 6 hours    MEDICATIONS  (PRN):    RADIOLOGY:  X Ray:  CT:  MRI:  Ultrasound:  [ ] Reviewed and interpreted by me    EKG:

## 2025-01-21 NOTE — DIETITIAN INITIAL EVALUATION ADULT - PERTINENT MEDS FT
MEDICATIONS  (STANDING):  acetylcysteine 10%  Inhalation 4 milliLiter(s) Inhalation every 6 hours  albuterol/ipratropium for Nebulization 3 milliLiter(s) Nebulizer every 6 hours  atorvastatin 20 milliGRAM(s) Oral at bedtime  cefTRIAXone   IVPB 1000 milliGRAM(s) IV Intermittent every 24 hours  chlorhexidine 2% Cloths 1 Application(s) Topical <User Schedule>  escitalopram 15 milliGRAM(s) Oral daily  gabapentin Solution 300 milliGRAM(s) Oral two times a day  heparin   Injectable 7500 Unit(s) SubCutaneous every 12 hours  influenza  Vaccine (HIGH DOSE) 0.5 milliLiter(s) IntraMuscular once  lacosamide IVPB 150 milliGRAM(s) IV Intermittent every 12 hours  lamoTRIgine 100 milliGRAM(s) Oral two times a day  levETIRAcetam  IVPB 1500 milliGRAM(s) IV Intermittent two times a day  lidocaine   4% Patch 1 Patch Transdermal daily  oseltamivir 30 milliGRAM(s) Oral two times a day    MEDICATIONS  (PRN):  acetaminophen   IVPB .. 1000 milliGRAM(s) IV Intermittent once PRN Temp greater or equal to 38C (100.4F), Mild Pain (1 - 3)  nystatin Powder 1 Application(s) Topical three times a day PRN fungal infection you may see and want to treat

## 2025-01-21 NOTE — SWALLOW FEES ASSESSMENT ADULT - UNSUCCESSFUL STRATEGIES TRIALED DURING PROCEDURE
do not consistently improve airway protection/hard swallow/nonproductive volitional cough following clinician cue

## 2025-01-21 NOTE — PROGRESS NOTE ADULT - SUBJECTIVE AND OBJECTIVE BOX
SUBJECTIVE / OVERNIGHT EVENTS:    patient seen and examined  resting comfortably in bed  extubated  stable in MICU  --------------------------------------------------------------------------------------------  LABS:                        10.6   6.58  )-----------( 103      ( 21 Jan 2025 00:43 )             33.1     01-21    140  |  106  |  40[H]  ----------------------------<  123[H]  4.0   |  19[L]  |  2.53[H]    Ca    8.8      21 Jan 2025 00:43  Phos  4.0     01-21  Mg     2.3     01-21    TPro  5.9[L]  /  Alb  3.1[L]  /  TBili  0.2  /  DBili  x   /  AST  58[H]  /  ALT  51[H]  /  AlkPhos  103  01-21    PT/INR - ( 21 Jan 2025 00:44 )   PT: 11.2 sec;   INR: 0.98 ratio         PTT - ( 21 Jan 2025 00:44 )  PTT:31.4 sec  CAPILLARY BLOOD GLUCOSE      POCT Blood Glucose.: 114 mg/dL (20 Jan 2025 16:56)        Urinalysis Basic - ( 21 Jan 2025 00:43 )    Color: x / Appearance: x / SG: x / pH: x  Gluc: 123 mg/dL / Ketone: x  / Bili: x / Urobili: x   Blood: x / Protein: x / Nitrite: x   Leuk Esterase: x / RBC: x / WBC x   Sq Epi: x / Non Sq Epi: x / Bacteria: x        RADIOLOGY & ADDITIONAL TESTS:    Imaging Personally Reviewed:  [x] YES  [ ] NO    Consultant(s) Notes Reviewed:  [x] YES  [ ] NO    MEDICATIONS  (STANDING):  acetylcysteine 10%  Inhalation 4 milliLiter(s) Inhalation every 6 hours  albuterol/ipratropium for Nebulization 3 milliLiter(s) Nebulizer every 6 hours  atorvastatin 20 milliGRAM(s) Oral at bedtime  cefTRIAXone   IVPB 1000 milliGRAM(s) IV Intermittent every 24 hours  chlorhexidine 2% Cloths 1 Application(s) Topical <User Schedule>  escitalopram 15 milliGRAM(s) Oral daily  gabapentin Solution 300 milliGRAM(s) Oral two times a day  heparin   Injectable 7500 Unit(s) SubCutaneous every 12 hours  influenza  Vaccine (HIGH DOSE) 0.5 milliLiter(s) IntraMuscular once  lacosamide IVPB 150 milliGRAM(s) IV Intermittent every 12 hours  lamoTRIgine 100 milliGRAM(s) Oral two times a day  levETIRAcetam  IVPB 1500 milliGRAM(s) IV Intermittent two times a day  lidocaine   4% Patch 1 Patch Transdermal daily  oseltamivir 30 milliGRAM(s) Oral two times a day    MEDICATIONS  (PRN):  acetaminophen   IVPB .. 1000 milliGRAM(s) IV Intermittent once PRN Temp greater or equal to 38C (100.4F), Mild Pain (1 - 3)  nystatin Powder 1 Application(s) Topical three times a day PRN fungal infection you may see and want to treat      Care Discussed with Consultants/Other Providers [x] YES  [ ] NO    Vital Signs Last 24 Hrs  T(C): 37.2 (21 Jan 2025 08:00), Max: 37.4 (20 Jan 2025 20:00)  T(F): 99 (21 Jan 2025 08:00), Max: 99.3 (20 Jan 2025 20:00)  HR: 87 (21 Jan 2025 11:01) (82 - 105)  BP: 142/74 (21 Jan 2025 10:00) (95/51 - 171/68)  BP(mean): 103 (21 Jan 2025 10:00) (70 - 119)  RR: 18 (21 Jan 2025 10:00) (18 - 25)  SpO2: 97% (21 Jan 2025 11:01) (91% - 99%)    Parameters below as of 21 Jan 2025 11:01  Patient On (Oxygen Delivery Method): nasal cannula      I&O's Summary    20 Jan 2025 07:01  -  21 Jan 2025 07:00  --------------------------------------------------------  IN: 415 mL / OUT: 1600 mL / NET: -1185 mL    21 Jan 2025 07:01  -  21 Jan 2025 11:36  --------------------------------------------------------  IN: 100 mL / OUT: 0 mL / NET: 100 mL      PHYSICAL EXAM:  GENERAL: NAD, well-developed, comfortable  HEAD:  + abrasion   EYES: EOMI, PERRLA, conjunctiva and sclera clear  NECK: Supple, No JVD  CHEST/LUNG: Clear to auscultation bilaterally; No wheeze  HEART: Regular rate and rhythm; No murmurs, rubs, or gallops  ABDOMEN: Soft, Nontender, Nondistended; Bowel sounds present  NEURO: AAOx3, no focal weakness, 5/5 b/l extremity strength, b/l knee no arthritis, no effusion   EXTREMITIES:  2+ Peripheral Pulses, No clubbing, cyanosis, or edema  SKIN: No rashes or lesions

## 2025-01-21 NOTE — SWALLOW FEES ASSESSMENT ADULT - DIAGNOSTIC IMPRESSIONS
FEES completed. Full report to follow. Pt presents on FEES with an oropharyngeal dysphagia. There is premature spillage and delayed pharyngeal trigger of thin liquids with e/o laryngeal penetration and trace aspiration. Delayed cough noted effective in clearing material from trachea however does not fully clear laryngeal vestibule. No penetration or aspiration on thickened liquids. Trace laryngeal penetration is noted inconsistently on food textures; material is retrieved during normal course of the swallow. Pt reports hoarse vocal quality, improving. B/l vocal cord granulomas noted; additional areas of granulation and erythema noted within larynx and along subglottic tracheal wall; suspect intubation related trauma. ENT may be considered if symptoms persist.

## 2025-01-21 NOTE — DIETITIAN INITIAL EVALUATION ADULT - NSICDXPASTMEDICALHX_GEN_ALL_CORE_FT
PAST MEDICAL HISTORY:  History of epilepsy     Hypertension, unspecified type     Obstructive sleep apnea     Other hyperlipidemia     Paranoid schizophrenia     Testicular cancer

## 2025-01-21 NOTE — DIETITIAN INITIAL EVALUATION ADULT - REASON FOR ADMISSION
Chart Reviewed, Events Noted  "69M w/ hx of metastatic testicular cancer, epilepsy, schizophrenia, developmental delay, HTN, HLD, VAZQUEZ, stage III CKD, obesity, secondary hyperparathyroidism, anemia, thrombocytopenia who was admitted on 1/16/25 for fall and syncope. On 1/17 patient had multiple RRTs called for grand mal seizure activity and intubated for airway protection with MICU transfer."

## 2025-01-22 LAB
ALBUMIN SERPL ELPH-MCNC: 3.4 G/DL — SIGNIFICANT CHANGE UP (ref 3.3–5)
ALP SERPL-CCNC: 139 U/L — HIGH (ref 40–120)
ALT FLD-CCNC: 97 U/L — HIGH (ref 10–45)
ANION GAP SERPL CALC-SCNC: 13 MMOL/L — SIGNIFICANT CHANGE UP (ref 5–17)
APTT BLD: 31.1 SEC — SIGNIFICANT CHANGE UP (ref 24.5–35.6)
AST SERPL-CCNC: 96 U/L — HIGH (ref 10–40)
BILIRUB SERPL-MCNC: 0.4 MG/DL — SIGNIFICANT CHANGE UP (ref 0.2–1.2)
BUN SERPL-MCNC: 38 MG/DL — HIGH (ref 7–23)
CALCIUM SERPL-MCNC: 9.2 MG/DL — SIGNIFICANT CHANGE UP (ref 8.4–10.5)
CHLORIDE SERPL-SCNC: 109 MMOL/L — HIGH (ref 96–108)
CK SERPL-CCNC: 237 U/L — HIGH (ref 30–200)
CO2 SERPL-SCNC: 20 MMOL/L — LOW (ref 22–31)
CREAT SERPL-MCNC: 2.23 MG/DL — HIGH (ref 0.5–1.3)
CULTURE RESULTS: SIGNIFICANT CHANGE UP
CULTURE RESULTS: SIGNIFICANT CHANGE UP
EGFR: 32 ML/MIN/1.73M2 — LOW
GLUCOSE SERPL-MCNC: 112 MG/DL — HIGH (ref 70–99)
HCT VFR BLD CALC: 33.4 % — LOW (ref 39–50)
HGB BLD-MCNC: 10.4 G/DL — LOW (ref 13–17)
INR BLD: 0.96 RATIO — SIGNIFICANT CHANGE UP (ref 0.85–1.16)
MAGNESIUM SERPL-MCNC: 2.3 MG/DL — SIGNIFICANT CHANGE UP (ref 1.6–2.6)
MCHC RBC-ENTMCNC: 31 PG — SIGNIFICANT CHANGE UP (ref 27–34)
MCHC RBC-ENTMCNC: 31.1 G/DL — LOW (ref 32–36)
MCV RBC AUTO: 99.7 FL — SIGNIFICANT CHANGE UP (ref 80–100)
NRBC # BLD: 0 /100 WBCS — SIGNIFICANT CHANGE UP (ref 0–0)
NRBC BLD-RTO: 0 /100 WBCS — SIGNIFICANT CHANGE UP (ref 0–0)
PHOSPHATE SERPL-MCNC: 3 MG/DL — SIGNIFICANT CHANGE UP (ref 2.5–4.5)
PLATELET # BLD AUTO: 114 K/UL — LOW (ref 150–400)
POTASSIUM SERPL-MCNC: 4.4 MMOL/L — SIGNIFICANT CHANGE UP (ref 3.5–5.3)
POTASSIUM SERPL-SCNC: 4.4 MMOL/L — SIGNIFICANT CHANGE UP (ref 3.5–5.3)
PROT SERPL-MCNC: 6.1 G/DL — SIGNIFICANT CHANGE UP (ref 6–8.3)
PROTHROM AB SERPL-ACNC: 10.9 SEC — SIGNIFICANT CHANGE UP (ref 9.9–13.4)
RBC # BLD: 3.35 M/UL — LOW (ref 4.2–5.8)
RBC # FLD: 13.1 % — SIGNIFICANT CHANGE UP (ref 10.3–14.5)
SODIUM SERPL-SCNC: 142 MMOL/L — SIGNIFICANT CHANGE UP (ref 135–145)
SPECIMEN SOURCE: SIGNIFICANT CHANGE UP
SPECIMEN SOURCE: SIGNIFICANT CHANGE UP
WBC # BLD: 4.89 K/UL — SIGNIFICANT CHANGE UP (ref 3.8–10.5)
WBC # FLD AUTO: 4.89 K/UL — SIGNIFICANT CHANGE UP (ref 3.8–10.5)

## 2025-01-22 PROCEDURE — 99233 SBSQ HOSP IP/OBS HIGH 50: CPT

## 2025-01-22 RX ORDER — AMLODIPINE BESYLATE 5 MG
5 TABLET ORAL DAILY
Refills: 0 | Status: COMPLETED | OUTPATIENT
Start: 2025-01-22 | End: 2025-01-22

## 2025-01-22 RX ORDER — ACETAMINOPHEN 160 MG/5ML
1000 SUSPENSION ORAL ONCE
Refills: 0 | Status: COMPLETED | OUTPATIENT
Start: 2025-01-22 | End: 2025-01-22

## 2025-01-22 RX ORDER — ACETAMINOPHEN 160 MG/5ML
650 SUSPENSION ORAL EVERY 6 HOURS
Refills: 0 | Status: DISCONTINUED | OUTPATIENT
Start: 2025-01-22 | End: 2025-01-23

## 2025-01-22 RX ORDER — LACOSAMIDE 200 MG/1
100 TABLET, FILM COATED ORAL EVERY 12 HOURS
Refills: 0 | Status: DISCONTINUED | OUTPATIENT
Start: 2025-01-22 | End: 2025-01-23

## 2025-01-22 RX ADMIN — LACOSAMIDE 120 MILLIGRAM(S): 200 TABLET, FILM COATED ORAL at 18:33

## 2025-01-22 RX ADMIN — LAMOTRIGINE 100 MILLIGRAM(S): 100 TABLET ORAL at 05:59

## 2025-01-22 RX ADMIN — LEVETIRACETAM 400 MILLIGRAM(S): 750 TABLET, FILM COATED ORAL at 05:58

## 2025-01-22 RX ADMIN — Medication 5 MILLIGRAM(S): at 23:40

## 2025-01-22 RX ADMIN — GABAPENTIN 300 MILLIGRAM(S): 800 TABLET ORAL at 18:05

## 2025-01-22 RX ADMIN — IPRATROPIUM BROMIDE AND ALBUTEROL SULFATE 3 MILLILITER(S): .5; 2.5 SOLUTION RESPIRATORY (INHALATION) at 23:42

## 2025-01-22 RX ADMIN — Medication 4 MILLILITER(S): at 18:05

## 2025-01-22 RX ADMIN — ACETAMINOPHEN 400 MILLIGRAM(S): 160 SUSPENSION ORAL at 23:38

## 2025-01-22 RX ADMIN — IPRATROPIUM BROMIDE AND ALBUTEROL SULFATE 3 MILLILITER(S): .5; 2.5 SOLUTION RESPIRATORY (INHALATION) at 18:05

## 2025-01-22 RX ADMIN — LIDOCAINE HYDROCHLORIDE 1 PATCH: 30 CREAM TOPICAL at 08:38

## 2025-01-22 RX ADMIN — LURASIDONE HYDROCHLORIDE 40 MILLIGRAM(S): 80 TABLET, FILM COATED ORAL at 15:44

## 2025-01-22 RX ADMIN — ESCITALOPRAM 15 MILLIGRAM(S): 10 TABLET, FILM COATED ORAL at 13:13

## 2025-01-22 RX ADMIN — Medication 7500 UNIT(S): at 05:58

## 2025-01-22 RX ADMIN — LACOSAMIDE 130 MILLIGRAM(S): 200 TABLET, FILM COATED ORAL at 05:58

## 2025-01-22 RX ADMIN — LEVETIRACETAM 400 MILLIGRAM(S): 750 TABLET, FILM COATED ORAL at 18:04

## 2025-01-22 RX ADMIN — Medication 5 MILLIGRAM(S): at 23:39

## 2025-01-22 RX ADMIN — Medication 1 APPLICATION(S): at 18:06

## 2025-01-22 RX ADMIN — Medication 7500 UNIT(S): at 18:04

## 2025-01-22 RX ADMIN — NYSTATIN 1 APPLICATION(S): 100000 POWDER TOPICAL at 05:58

## 2025-01-22 RX ADMIN — ACETAMINOPHEN 650 MILLIGRAM(S): 160 SUSPENSION ORAL at 17:30

## 2025-01-22 RX ADMIN — ATORVASTATIN CALCIUM 20 MILLIGRAM(S): 80 TABLET, FILM COATED ORAL at 21:17

## 2025-01-22 RX ADMIN — Medication 4 MILLILITER(S): at 23:41

## 2025-01-22 RX ADMIN — ACETAMINOPHEN 650 MILLIGRAM(S): 160 SUSPENSION ORAL at 16:23

## 2025-01-22 RX ADMIN — Medication 4 MILLILITER(S): at 05:58

## 2025-01-22 RX ADMIN — LIDOCAINE HYDROCHLORIDE 1 PATCH: 30 CREAM TOPICAL at 05:57

## 2025-01-22 RX ADMIN — LIDOCAINE HYDROCHLORIDE 1 PATCH: 30 CREAM TOPICAL at 17:00

## 2025-01-22 RX ADMIN — LAMOTRIGINE 100 MILLIGRAM(S): 100 TABLET ORAL at 18:05

## 2025-01-22 RX ADMIN — ACETAMINOPHEN 650 MILLIGRAM(S): 160 SUSPENSION ORAL at 05:59

## 2025-01-22 RX ADMIN — IPRATROPIUM BROMIDE AND ALBUTEROL SULFATE 3 MILLILITER(S): .5; 2.5 SOLUTION RESPIRATORY (INHALATION) at 05:58

## 2025-01-22 RX ADMIN — GABAPENTIN 300 MILLIGRAM(S): 800 TABLET ORAL at 05:58

## 2025-01-22 RX ADMIN — IPRATROPIUM BROMIDE AND ALBUTEROL SULFATE 3 MILLILITER(S): .5; 2.5 SOLUTION RESPIRATORY (INHALATION) at 13:16

## 2025-01-22 NOTE — OCCUPATIONAL THERAPY INITIAL EVALUATION ADULT - PERTINENT HX OF CURRENT PROBLEM, REHAB EVAL
69M w/ hx of metastatic testicular cancer (s/p L orchiectomy, on etoposide/cisplatin chemo, last dose 6/2024), epilepsy (grand-mal seizures), schizophrenia, developmental delay, HTN, HLD, VAZQUEZ, stage III CKD, severe obesity, secondary hyperparathyroidism, anemia, thrombocytopenia who presented to ED for fall, possible seizure prior. Sister at bedside providing history as well. Patient reports last seizure 3 years ago, is adherent with medications (on lamotrigine and keppra), states he believes he had a seizure at home while ambulating to get mail from his mailbox. States he doesn't typically have an aura, just 'blacks out' for about 5 minutes. Notes this time was found by neighbor with abrasion to forehead, R cheek. Unsure time down, denies tongue-biting, b/b incontinence. Normally ambulates with cane. Per ED triage note, on EMS arrival, appeared in post-ictal state. Endorsing R sided chest pressure, and neck tenderness post fall. States has been in normal state of health, eating/drinking well, did have some hot flashes/chills last night, but did not take temperature. Denies any f/c, cough, SOB, rhinorrhea, sore throat, or any other acute sx. ambulatory

## 2025-01-22 NOTE — PROVIDER CONTACT NOTE (OTHER) - ASSESSMENT
Pt A&Ox4, VS as charted. Pt c/o headache. Pt denies chest pain, n/v, SOB, dizziness, blurred vision
pt.'s b/p 240/118, ,
RR 20, Temp oral= 98.7, POX 2lit o2 nc 97%. no c/o sob/ chest pain.
pt. A&Ox3-4.
Pt. responsive at present & breathing with 2lit o2 nc POX 98%, b/p 205/106, RR 20, Temp Rectal at 13:35Hrs T=101.

## 2025-01-22 NOTE — PROGRESS NOTE ADULT - ASSESSMENT
ASSESSMENT: 68 y/o M w/ PMHx of Epilepsy (febrile sz in childhood, GTC in adulthood, on Keppra, Lamictal and Gabapentin), metastatic testicular cancer on etoposide/cisplatin chemo, s/p L orchidectomy, hyperparathyroidism, obesity, VAZQUEZ, schizophrenia comes in after syncopal event in the setting of influenza A infection, likely from toxic/metabolic or peripheral process with acute medical issues but could also be cardiac. Seizure cannot be entirely excluded but much lower on the differential; if it did happen would favor provoked in the above setting. He is back to neurologic baseline without any recurrent events, focal neurologic deficits, or abnormal movements. CT head and CT c-spine, personally reviewed by me, with cervical degenerative changes but no acute intracranial or spinal findings. Multiple RRT's for seizures and unresponsiveness on 1/17, intubated and transferred to MICU. No further seizure like activity. Repeat CT head, personally reviewed by me, stable. vEEG with moderate generalized slowing and no evidence for focal slowing, epileptiform discharges, or seizures. Keppra was increased to 1500mg BID, as well as Vimpat was loaded at 200mg with maintenance Vimpat 150mg BID. On exam today patient appears comfortable in no acute distress, denies focal neurological deficits or complaints.   -Syncope  -Influenza A infection  -Epilepsy, intractable, without status epilepticus  -Metastatic testicular cancer  -VAZQUEZ  -Schizophrenia  -HTN  -Hyponatremia  -SHAGUFTA  -Transaminitis  -Bicytopenia    IMPRESSION: Multiple GTCs with acute worsening of frequency likely due to underlying + Flu A infection    RECOMMENDATIONS:  -No further need for EEG at this time  -Decrease Vimpat to 100mg PO/IV BID, and continue with rest of current ASM (Keppra 1500mg BID, Lamotrigine 100mg BID, Gabapentin 300mg BID)  -If pt has GTC for >3min and/or unstable vitals, give IV ativan 2mg  -Continue medical management as per primary team

## 2025-01-22 NOTE — PROGRESS NOTE ADULT - TIME BILLING
Report called to SELINA Beard at [facility]. Pt can be transferred as soon as EMS available. Pt remains calm and cooperative. No acute distress noted at this time. Will cont to monitor.   
Chart review, exam, counseling, coordination of care
- preparing to see the patient (eg, review of tests, documents, and imaging)  - performing a medically appropriate evaluation and examination  - speaking with patient and/or family as appropriate  - referring and communicating with other health care professionals  - documenting clinical information in the electronic or other health record  - care coordination.    time spent does not include teaching services
Chart review, exam, coordination of care
chart and data review, clinical assessment, and coordination of care. This excludes any time spent on separate procedures or teaching.

## 2025-01-22 NOTE — PROGRESS NOTE ADULT - ASSESSMENT
69M w/ hx of metastatic testicular cancer (s/p L orchiectomy, on etoposide/cisplatin chemo), epilepsy (grand-mal seizures), schizophrenia, developmental delay, HTN, HLD, VAZQUEZ, stage III CKD, severe obesity, secondary hyperparathyroidism, anemia, thrombocytopenia who presented to ED for fall, possible seizure prior.    Plan:    # Syncopal seizure:  -  Patient with hx of epilepsy, generalized grand mal seizures  - C/w Lamictal and Keppra  - CTH/C spine/MF negative for acute ICH, notable for mild R hematoma  - 1/17 sp multiple RRTs  for grand mal seizure activity and was initially recommended sepsis workup and antipyretics given T103F, but patient had x2 more episode and was ultimately given ativan 2mg, vimpat load 200mg, and intubated for airway protection with MICU transfer.   - Extubated 1/20  - Keppra 1.5g BID, Lamotrigine 100mg BID, Gabapentin 300mg BID, Vimpat 150mg BID  - Ativan for seizures PRM  - Per Neuro -> MRI brain w and wo contrast to look for potential seizure foci that could cause increased frequency especially given hx of metastatic testicular cancer; deferred MRI given copious secretions   - CT head with no brain mets  - s/p vEEG, negative for seizures  - Care per MICU-> downgraded 1/21    # Flu:  - Continue tamiflu to complete 5d on 1/22   - Monitor temps/WBC  - ID following    # CKD3:  - Monitor I/O's  - Serial Cr  - Avoid nephrotoxins  - Renally dose medications    # HTN/ HLD:  - C/w CV meds  - Post extubation concerns for Flash pulmonary edema with SBP 180s, received Hydralazine 10 x 2  - Echo with Left ventricular systolic function is normal with an ejection fraction of 62 %. There are no regional wall motion abnormalities seen. Normal right ventricular cavity size and normal right ventricular systolic function.    # Pneumonia:  - C/w IV abx-> Continue ceftriaxone to complete 5d course on 1/21  - ID following    # Schizophrenia/Depression:  - C/w home meds    # VAZQUEZ:  - CPAP at night    # Hx of metastatic testicular cancer, s/p L orchiectomy, on etoposide/cisplatin chemo, last dose on 6/21/24:  - Outpt Oncology follow-up    # DVT ppx:  - Heparin sq    [facility]  [phone]

## 2025-01-22 NOTE — PROGRESS NOTE ADULT - SUBJECTIVE AND OBJECTIVE BOX
NEUROLOGY FOLLOW-UP CONSULT NOTE    RFC: seizure    Interval history: No acute neurologic events overnight. Patient seen and examined by neurology this afternoon, denies focal neurologic deficits or acute complaints. Sitting comfortably in chair, not on sedation. As per primary team no more GTCs since RRT on 1/17. Not currently connected to EEG at this time.    Meds:  MEDICATIONS  (STANDING):  acetylcysteine 10%  Inhalation 4 milliLiter(s) Inhalation every 6 hours  albuterol/ipratropium for Nebulization 3 milliLiter(s) Nebulizer every 6 hours  amLODIPine   Tablet 5 milliGRAM(s) Oral daily  atorvastatin 20 milliGRAM(s) Oral at bedtime  bacitracin   Ointment 1 Application(s) Topical two times a day  cefTRIAXone   IVPB 1000 milliGRAM(s) IV Intermittent every 24 hours  chlorhexidine 2% Cloths 1 Application(s) Topical <User Schedule>  escitalopram 15 milliGRAM(s) Oral daily  gabapentin Solution 300 milliGRAM(s) Oral two times a day  heparin   Injectable 7500 Unit(s) SubCutaneous every 12 hours  influenza  Vaccine (HIGH DOSE) 0.5 milliLiter(s) IntraMuscular once  lacosamide IVPB 150 milliGRAM(s) IV Intermittent every 12 hours  lamoTRIgine 100 milliGRAM(s) Oral two times a day  levETIRAcetam  IVPB 1500 milliGRAM(s) IV Intermittent two times a day  lidocaine   4% Patch 1 Patch Transdermal daily  lisinopril 5 milliGRAM(s) Oral daily  lurasidone 40 milliGRAM(s) Oral daily    MEDICATIONS  (PRN):  acetaminophen     Tablet .. 650 milliGRAM(s) Oral every 6 hours PRN Temp greater or equal to 38C (100.4F), Mild Pain (1 - 3), Moderate Pain (4 - 6)  acetaminophen   IVPB .. 1000 milliGRAM(s) IV Intermittent once PRN Temp greater or equal to 38C (100.4F), Mild Pain (1 - 3)  nystatin Powder 1 Application(s) Topical three times a day PRN fungal infection you may see and want to treat      PMHx/PSHx/FHx/SHx:  Syncope and collapse    Anemia    Handoff    MEWS Score    Hypertension, unspecified type    Other hyperlipidemia    History of epilepsy    Paranoid schizophrenia    Obstructive sleep apnea    Testicular cancer    Syncope    Syncopal seizure    Acute chest pain    Non-ST elevation MI (NSTEMI)    Stage 3 chronic kidney disease    Influenza A    HLD (hyperlipidemia)    Schizophrenia    VAZQUEZ on CPAP    Testicular cancer    Prophylactic measure    Elevated troponin    Mild HTN    HTN (hypertension)    H/O Spinal surgery    FALL    90+    Influenza A    [email or web address]      Allergies:  latex (Rash)  penicillin (Hives)  seasonal allergies (Unknown)      ROS: All systems negative except as documented in Interval history    O:  T(C): 37.3 (01-22-25 @ 08:39), Max: 37.3 (01-22-25 @ 08:39)  HR: 91 (01-22-25 @ 11:10) (85 - 106)  BP: 142/89 (01-22-25 @ 11:10) (120/70 - 165/108)  RR: 18 (01-22-25 @ 08:39) (18 - 26)  SpO2: 91% (01-22-25 @ 11:10) (90% - 99%)    Focused neurologic exam:  MS - AAO x3, speech fluent, rep/naming intact, follows commands, attn/conc/recent and remote memory/fund of knowledge WNL  CN - PERRLA, EOMI, VFF, face sens/str/hearing WNL b/l, tongue/palate midline, trap 5/5 b/l  Motor - Normal bulk/tone, 5/5 all  Sens - LT intact all  DTR's -  2+ BR b/l, 1+ biceps b/l, 0+ rest, and upgoing R and neutral L plantar response  Coord - FtN intact b/l  Gait and station - unable to assess d/t concern for pt safety    Pertinent labs/studies:    LABS:                         10.4   4.89  )-----------( 114      ( 22 Jan 2025 07:28 )             33.4     01-22    142  |  109[H]  |  38[H]  ----------------------------<  112[H]  4.4   |  20[L]  |  2.23[H]    Ca    9.2      22 Jan 2025 07:26  Phos  3.0     01-22  Mg     2.3     01-22    TPro  6.1  /  Alb  3.4  /  TBili  0.4  /  DBili  x   /  AST  96[H]  /  ALT  97[H]  /  AlkPhos  139[H]  01-22    PT/INR - ( 22 Jan 2025 07:28 )   PT: 10.9 sec;   INR: 0.96 ratio         PTT - ( 22 Jan 2025 07:28 )  PTT:31.1 sec  Urinalysis Basic - ( 22 Jan 2025 07:26 )    Color: x / Appearance: x / SG: x / pH: x  Gluc: 112 mg/dL / Ketone: x  / Bili: x / Urobili: x   Blood: x / Protein: x / Nitrite: x   Leuk Esterase: x / RBC: x / WBC x   Sq Epi: x / Non Sq Epi: x / Bacteria: x          RADIOLOGY, EKG & ADDITIONAL TESTS: Reviewed.     EEG Report	    Study Start: 1/19/2025	08:00    Study End:  1/19/2025	13:06  Study Duration: 5 hr  6 min    -------------------------------------------------------------------------------------------------------  EEG Impression / Clinical Correlate:    Abnormal prolonged EEG study due to Moderate diffuse cerebral dysfunction that is not specific in etiology or at least partly due to medications  No epileptic discharges recorded.  No seizures recorded.      < from: CT Head No Cont (01.17.25 @ 20:08) >    IMPRESSION:  No CT evidence of acute intracranial pathology.    Scattered fecal thickening.  Air-fluid levels in the maxillary sinuses   may represent trapped secretions versus sinusitis.    Nonspecific trace left mastoid effusion.      --- End of Report ---    < end of copied text >

## 2025-01-22 NOTE — PROGRESS NOTE ADULT - ASSESSMENT
Patient is a 69 year old male with PMH of metastatic testicular cancer (s/p L orchiectomy, on etoposide/cisplatin chemo, last dose 6/2024), epilepsy (grand-mal seizures), schizophrenia, developmental delay, HTN, HLD, VAZQUEZ, stage III CKD, severe obesity, secondary hyperparathyroidism, anemia, thrombocytopenia who presented to ED for fall, possible seizure prior. Patient reported cough for few weeks with no other respiratory symptoms.   Admitted for further work up for syncope, c/f seizure   Influenza A  - CXR with clear lungs, no pneumonia   1/17 s/p RRTs for grand mal seizure activity, s/p intubation and transfer to MICU  - 1/17 CT chest with anterior bowing of posterior tracheal wall suggestive of tracheomalacia, trace R pleural effusion   - started on ceftriaxone for possible aspiration on 1/17 afternoon   - noted tamiflu was actually started 1/17pm after RRT; had initially refused    - MRSA PCR screen negative    - sputum culture negative    - s/p extubation 1/19  - 1/20 Bcx NGTD   - afebrile x24h, WBC wnl, RA, feels improved     Recommendations:   Discontinued ceftriaxone - completed 5d course 1/21   S/p tamiflu - completed 5d course 1/17-1/21   Monitor off antibiotics   Supportive care  Neurology following  Continue rest of care per primary team       Greyson Mart M.D.  [facility]  Available on [employer] TEAMS - *PREFERRED*  [phone]  After 5pm on weekdays and all day on weekends - please call [phone]     Thank you for consulting us and involving us in the management of this patients case. In addition to reviewing history, imaging, documents, labs, microbiology, took into account antibiotic stewardship, local antibiogram and infection control strategies and potential transmission issues.

## 2025-01-22 NOTE — PROGRESS NOTE ADULT - SUBJECTIVE AND OBJECTIVE BOX
[facility]  JACINTO Horton Y. Patel, S. Shah, G. Casimir  [phone]  ([phone] - weekdays after 5pm and weekends)    Name: OSCAR MILAN  Age/Gender: 69y Male  MRN: [id number]    Interval History:  Patient seen and examined this morning.   Feels better, dry cough, no pain or fevers.   No new complaints noted.  Notes reviewed  No concerning overnight events  Afebrile   Allergies: penicillin (Hives)  seasonal allergies (Unknown)      Objective:  Vitals:   T(F): 99.1 (01-22-25 @ 08:39), Max: 99.1 (01-22-25 @ 08:39)  HR: 91 (01-22-25 @ 11:10) (85 - 106)  BP: 142/89 (01-22-25 @ 11:10) (136/84 - 165/108)  RR: 18 (01-22-25 @ 08:39) (18 - 25)  SpO2: 91% (01-22-25 @ 11:10) (90% - 99%)  Physical Examination:  General: no acute distress, RA, obese  HEENT: normocephalic, anicteric, EOMI  Respiratory: decreased breath sounds b/l   Cardiovascular: S1 and S2 present, normal rate   Gastrointestinal: soft, nontender, nondistended  Extremities: no edema, no cyanosis  Skin: no visible rash, R brow wound     Laboratory Studies:  CBC:                       10.4   4.89  )-----------( 114      ( 22 Jan 2025 07:28 )             33.4     WBC Trend:  4.89 01-22-25 @ 07:28  6.58 01-21-25 @ 00:43  10.49 01-20-25 @ 00:49  9.51 01-18-25 @ 23:35  9.51 01-18-25 @ 15:08  17.77 01-17-25 @ 22:20  13.82 01-17-25 @ 19:13  6.72 01-17-25 @ 07:02  8.38 01-16-25 @ 07:30    CMP: 01-22    142  |  109[H]  |  38[H]  ----------------------------<  112[H]  4.4   |  20[L]  |  2.23[H]    Ca    9.2      22 Jan 2025 07:26  Phos  3.0     01-22  Mg     2.3     01-22    TPro  6.1  /  Alb  3.4  /  TBili  0.4  /  DBili  x   /  AST  96[H]  /  ALT  97[H]  /  AlkPhos  139[H]  01-22    Creatinine: 2.23 mg/dL (01-22-25 @ 07:26)  Creatinine: 2.53 mg/dL (01-21-25 @ 00:43)  Creatinine: 2.49 mg/dL (01-20-25 @ 00:50)  Creatinine: 2.57 mg/dL (01-19-25 @ 19:32)  Creatinine: 3.14 mg/dL (01-18-25 @ 23:35)  Creatinine: 3.25 mg/dL (01-18-25 @ 15:08)  Creatinine: 2.56 mg/dL (01-17-25 @ 22:46)  Creatinine: 2.55 mg/dL (01-17-25 @ 19:13)  Creatinine: 2.27 mg/dL (01-17-25 @ 12:49)  Creatinine: 2.79 mg/dL (01-16-25 @ 07:30)    LIVER FUNCTIONS - ( 22 Jan 2025 07:26 )  Alb: 3.4 g/dL / Pro: 6.1 g/dL / ALK PHOS: 139 U/L / ALT: 97 U/L / AST: 96 U/L / GGT: x           Microbiology: reviewed   Culture - Blood (collected 01-20-25 @ 01:52)  Source: .Blood BLOOD  Preliminary Report (01-22-25 @ 09:01):    No growth at 48 Hours    Culture - Blood (collected 01-19-25 @ 19:28)  Source: .Blood BLOOD  Preliminary Report (01-22-25 @ 04:01):    No growth at 48 Hours    Culture - Sputum (collected 01-18-25 @ 00:36)  Source: .Sputum Sputum  Gram Stain (01-18-25 @ 06:52):    Moderate polymorphonuclear leukocytes per low power field    No Squamous epithelial cells per low power field    No organisms seen per oil power field  Final Report (01-19-25 @ 08:17):    No growth    Culture - Blood (collected 01-17-25 @ 15:10)  Source: .Blood BLOOD  Preliminary Report (01-21-25 @ 20:00):    No growth at 4 days    Culture - Blood (collected 01-17-25 @ 14:51)  Source: .Blood BLOOD  Preliminary Report (01-21-25 @ 20:00):    No growth at 4 days    SARS-CoV-2 Result: NotDete (16 Jan 2025 08:24)    Radiology: reviewed     Medications:  acetaminophen     Tablet .. 650 milliGRAM(s) Oral every 6 hours PRN  acetaminophen   IVPB .. 1000 milliGRAM(s) IV Intermittent once PRN  acetylcysteine 10%  Inhalation 4 milliLiter(s) Inhalation every 6 hours  albuterol/ipratropium for Nebulization 3 milliLiter(s) Nebulizer every 6 hours  amLODIPine   Tablet 5 milliGRAM(s) Oral daily  atorvastatin 20 milliGRAM(s) Oral at bedtime  bacitracin   Ointment 1 Application(s) Topical two times a day  cefTRIAXone   IVPB 1000 milliGRAM(s) IV Intermittent every 24 hours  chlorhexidine 2% Cloths 1 Application(s) Topical <User Schedule>  escitalopram 15 milliGRAM(s) Oral daily  gabapentin Solution 300 milliGRAM(s) Oral two times a day  heparin   Injectable 7500 Unit(s) SubCutaneous every 12 hours  influenza  Vaccine (HIGH DOSE) 0.5 milliLiter(s) IntraMuscular once  lacosamide IVPB 150 milliGRAM(s) IV Intermittent every 12 hours  lamoTRIgine 100 milliGRAM(s) Oral two times a day  levETIRAcetam  IVPB 1500 milliGRAM(s) IV Intermittent two times a day  lidocaine   4% Patch 1 Patch Transdermal daily  lisinopril 5 milliGRAM(s) Oral daily  lurasidone 40 milliGRAM(s) Oral daily  nystatin Powder 1 Application(s) Topical three times a day PRN    Current Antimicrobials:  cefTRIAXone   IVPB 1000 milliGRAM(s) IV Intermittent every 24 hours    Prior/Completed Antimicrobials:  cefTRIAXone   IVPB  cefTRIAXone   IVPB  oseltamivir  vancomycin  IVPB

## 2025-01-22 NOTE — PROGRESS NOTE ADULT - SUBJECTIVE AND OBJECTIVE BOX
SUBJECTIVE / OVERNIGHT EVENTS:      Patient seen and examined at bedside. No events noted overnight. Resting comfortably in bed.      --------------------------------------------------------------------------------------------  LABS:                        10.4   4.89  )-----------( 114      ( 22 Jan 2025 07:28 )             33.4     01-22    142  |  109[H]  |  38[H]  ----------------------------<  112[H]  4.4   |  20[L]  |  2.23[H]    Ca    9.2      22 Jan 2025 07:26  Phos  3.0     01-22  Mg     2.3     01-22    TPro  6.1  /  Alb  3.4  /  TBili  0.4  /  DBili  x   /  AST  96[H]  /  ALT  97[H]  /  AlkPhos  139[H]  01-22    PT/INR - ( 22 Jan 2025 07:28 )   PT: 10.9 sec;   INR: 0.96 ratio         PTT - ( 22 Jan 2025 07:28 )  PTT:31.1 sec  CAPILLARY BLOOD GLUCOSE      POCT Blood Glucose.: 120 mg/dL (21 Jan 2025 12:57)        Urinalysis Basic - ( 22 Jan 2025 07:26 )    Color: x / Appearance: x / SG: x / pH: x  Gluc: 112 mg/dL / Ketone: x  / Bili: x / Urobili: x   Blood: x / Protein: x / Nitrite: x   Leuk Esterase: x / RBC: x / WBC x   Sq Epi: x / Non Sq Epi: x / Bacteria: x        RADIOLOGY & ADDITIONAL TESTS:    Imaging Personally Reviewed:  [x] YES  [ ] NO    Consultant(s) Notes Reviewed:  [x] YES  [ ] NO    MEDICATIONS  (STANDING):  acetylcysteine 10%  Inhalation 4 milliLiter(s) Inhalation every 6 hours  albuterol/ipratropium for Nebulization 3 milliLiter(s) Nebulizer every 6 hours  amLODIPine   Tablet 5 milliGRAM(s) Oral daily  atorvastatin 20 milliGRAM(s) Oral at bedtime  cefTRIAXone   IVPB 1000 milliGRAM(s) IV Intermittent every 24 hours  chlorhexidine 2% Cloths 1 Application(s) Topical <User Schedule>  escitalopram 15 milliGRAM(s) Oral daily  gabapentin Solution 300 milliGRAM(s) Oral two times a day  heparin   Injectable 7500 Unit(s) SubCutaneous every 12 hours  influenza  Vaccine (HIGH DOSE) 0.5 milliLiter(s) IntraMuscular once  lacosamide IVPB 150 milliGRAM(s) IV Intermittent every 12 hours  lamoTRIgine 100 milliGRAM(s) Oral two times a day  levETIRAcetam  IVPB 1500 milliGRAM(s) IV Intermittent two times a day  lidocaine   4% Patch 1 Patch Transdermal daily  lisinopril 5 milliGRAM(s) Oral daily  lurasidone 40 milliGRAM(s) Oral daily    MEDICATIONS  (PRN):  acetaminophen     Tablet .. 650 milliGRAM(s) Oral every 6 hours PRN Temp greater or equal to 38C (100.4F), Mild Pain (1 - 3), Moderate Pain (4 - 6)  acetaminophen   IVPB .. 1000 milliGRAM(s) IV Intermittent once PRN Temp greater or equal to 38C (100.4F), Mild Pain (1 - 3)  nystatin Powder 1 Application(s) Topical three times a day PRN fungal infection you may see and want to treat      Care Discussed with Consultants/Other Providers [x] YES  [ ] NO    Vital Signs Last 24 Hrs  T(C): 37.3 (22 Jan 2025 08:39), Max: 37.3 (22 Jan 2025 08:39)  T(F): 99.1 (22 Jan 2025 08:39), Max: 99.1 (22 Jan 2025 08:39)  HR: 106 (22 Jan 2025 08:39) (85 - 106)  BP: 136/84 (22 Jan 2025 08:39) (120/70 - 165/108)  BP(mean): 102 (21 Jan 2025 19:30) (90 - 110)  RR: 18 (22 Jan 2025 08:39) (18 - 26)  SpO2: 90% (22 Jan 2025 08:39) (90% - 99%)    Parameters below as of 22 Jan 2025 08:39  Patient On (Oxygen Delivery Method): nasal cannula  O2 Flow (L/min): 2    I&O's Summary    21 Jan 2025 07:01  -  22 Jan 2025 07:00  --------------------------------------------------------  IN: 865 mL / OUT: 670 mL / NET: 195 mL    PHYSICAL EXAM:  GENERAL: NAD, well-developed, comfortable  HEAD:  + abrasion   EYES: EOMI, PERRLA, conjunctiva and sclera clear  NECK: Supple, No JVD  CHEST/LUNG: Clear to auscultation bilaterally; No wheeze  HEART: Regular rate and rhythm; No murmurs, rubs, or gallops  ABDOMEN: Soft, Nontender, Nondistended; Bowel sounds present  NEURO: AAOx3, no focal weakness, 5/5 b/l extremity strength, b/l knee no arthritis, no effusion   EXTREMITIES:  2+ Peripheral Pulses, No clubbing, cyanosis, or edema  SKIN: No rashes or lesions

## 2025-01-22 NOTE — OCCUPATIONAL THERAPY INITIAL EVALUATION ADULT - ADDITIONAL COMMENTS
Pt lives alone in apt +4STE w/BL HR. PTA, pt was indep in ADL and amb with cane. [facility] services to assist with IADL 930-130pm M-F.

## 2025-01-22 NOTE — CHART NOTE - NSCHARTNOTEFT_GEN_A_CORE
CC:    HPI:  Notified by RN patient with temperature of F. Patient seen and examined  by me at bedside.   Patient is alert, NAD.  Patient denied headache, dizziness, chest pain, shortness of breath, cough, rhinorrhea, N/V/D, urinary symptoms, or abdominal pain.        Physical Exam:  General: WN/WD NAD, AOx3, nontoxic appearing  Head:  NC/AT  CV: RRR, S1S2   Respiratory: CTA B/L, nonlabored. No rales/rhonchi/wheezes.  Abdominal: (+) bowel sounds x4. Soft, NT, ND, no palpable mass, no guarding, or rebound tenderness  Genitourinary: ? [place]   MSK: No BLLE edema, + peripheral pulses, FROM all 4 extremity  Skin: (+) warm, dry   Psych: Appropriate affect           RADIOLOGY:    ASSESSMENT/PLAN:   HPI:        Patient now presenting acutely with temperature of    1) Fever  -Tylenol   -BCx x2  -UA/UCx  -CXR  -c/w   -ID  -Will continue to closely monitor patient/vitals   -Will endorse to day team    Rhina Davidson PA-C  Dept of Medicine  [facility] CC:    HPI:  Notified by RN patient with temperature of F. Patient seen and examined  by me at bedside.   Patient is alert, NAD.  Patient denied headache, dizziness, chest pain, shortness of breath, cough, N/V/D, urinary symptoms, or abdominal pain.  Last fever was 01/20.   ID following, pt is s/p CTX for suspected PNA (completed 5d course last dose 1/17-1/21), s/p Tamiflu for Influenza (completed 5d course 1/17-1/21)            Physical Exam:  General: WN/WD NAD, AOx3, nontoxic appearing  Head:  NC/AT  CV: RRR, S1S2   Respiratory: CTA B/L, nonlabored. No rales/rhonchi/wheezes.  Abdominal: (+) bowel sounds x4. Soft, NT, ND, no palpable mass, no guarding, or rebound tenderness  Genitourinary: ? [place]   MSK: No BLLE edema, + peripheral pulses, FROM all 4 extremity  Skin: (+) warm, dry   Psych: Appropriate affect           RADIOLOGY:    ASSESSMENT/PLAN:   HPI:        Patient now presenting acutely with temperature of    1) Fever  -Tylenol   -BCx x2  -UA/UCx  -CXR  -c/w   -ID  -Will continue to closely monitor patient/vitals   -Will endorse to day team    Rhina Davidson PA-C  Dept of Medicine  [facility] PA NOTE    Notified by RN: pt with fever of...      Notified by RN patient with temperature of F.  Patient is alert, NAD.  Patient denied headache, dizziness, chest pain, shortness of breath, cough, N/V/D, urinary symptoms, or abdominal pain.  Last fever was 01/20.   ID following, pt is s/p CTX for suspected PNA (completed 5d course last dose 1/17-1/21), s/p Tamiflu for Influenza (completed 5d course 1/17-1/21)            GENERAL: NAD, well-developed, comfortable  HEAD:  + abrasion   EYES: EOMI, PERRLA, conjunctiva and sclera clear  NECK: Supple, No JVD  CHEST/LUNG: Clear to auscultation bilaterally; No wheeze  HEART: Regular rate and rhythm; No murmurs, rubs, or gallops  ABDOMEN: Soft, Nontender, Nondistended; Bowel sounds present  NEURO: AAOx3, no focal weakness, 5/5 b/l extremity strength, b/l knee no arthritis, no effusion   EXTREMITIES:  2+ Peripheral Pulses, No clubbing, cyanosis, or edema  SKIN: No rashes or lesions    HPI:        Patient now presenting acutely with temperature of    1) Fever  -Tylenol   -BCx x2  -UA/UCx  -CXR  -c/w   -ID  -Will continue to closely monitor patient/vitals   -Will endorse to day team    Rhina Davidson PA-C  Dept of Medicine  [facility] PA NOTE    Notified by RN: pt with fever of 100.6F    HPI: 69M w/ hx of metastatic testicular cancer (s/p L orchiectomy, on etoposide/cisplatin chemo), epilepsy (grand-mal seizures), schizophrenia, developmental delay, HTN, HLD, VAZQUEZ, stage III CKD, severe obesity, secondary hyperparathyroidism, anemia, thrombocytopenia who presented to ED syncope.    Course c/b grand mal seizure requiring intubation and MICU transfer for airway protection, now downgraded.   on Lamictal and Keppra  Course also c/b Fevers; Last fever was 01/20.   ID following, pt is s/p CTX for suspected PNA (completed 5d course last dose 1/17-1/21), s/p Tamiflu for Influenza (completed 5d course 1/17-1/21)     Currently, patient is alert, NAD.  Patient denied headache, dizziness, chest pain, shortness of breath, cough, N/V/D, urinary symptoms, or abdominal pain.    Vital signs were also notable for hypertension; 223/139. Pt's home medications were reinstated on 1/21, however he did not receive these medications on 1/22.     Vital Signs Last 24 Hrs  T(C): 37.2 (22 Jan 2025 15:45), Max: 37.3 (22 Jan 2025 08:39)  T(F): 99 (22 Jan 2025 15:45), Max: 99.1 (22 Jan 2025 08:39)  HR: 125 (22 Jan 2025 23:39) (88 - 125)  BP: 223/139 (22 Jan 2025 23:39) (136/84 - 223/139)  BP(mean): --  RR: 18 (22 Jan 2025 23:39) (18 - 19)  SpO2: 94% (22 Jan 2025 23:39) (90% - 100%)    Parameters below as of 22 Jan 2025 23:39  Patient On (Oxygen Delivery Method): BiPAP/CPAP      Physical Exam:   GENERAL: NAD, well-developed, comfortable  HEAD:  + abrasion   EYES: EOMI, PERRLA, conjunctiva and sclera clear  NECK: Supple, No JVD  CHEST/LUNG: Clear to auscultation bilaterally; No wheeze  HEART: Regular rate and rhythm; No murmurs, rubs, or gallops  ABDOMEN: Soft, Nontender, Nondistended; Bowel sounds present  NEURO: AAOx3, no focal weakness, 5/5 b/l extremity strength, b/l knee no arthritis, no effusion   EXTREMITIES:  2+ Peripheral Pulses, No clubbing, cyanosis, or edema  SKIN: No rashes or lesions    A&P    1) Fever  - IV Tylenol   -CMP, CBC, Lactate, BCx x2, UA.   -CXR  -Follow up w/ ID in the AM     2) Uncontrolled Hypertension   - Administer Lisinopril now   - Administer Amlodipine now   - Reassess BP in 1 hour.     Will continue to closely monitor patient/vitals and will  endorse to day team    Rhina Davidson PA-C  Dept of Medicine  [facility]

## 2025-01-23 DIAGNOSIS — G47.33 OBSTRUCTIVE SLEEP APNEA (ADULT) (PEDIATRIC): ICD-10-CM

## 2025-01-23 DIAGNOSIS — R50.9 FEVER, UNSPECIFIED: ICD-10-CM

## 2025-01-23 DIAGNOSIS — J96.01 ACUTE RESPIRATORY FAILURE WITH HYPOXIA: ICD-10-CM

## 2025-01-23 DIAGNOSIS — R56.9 UNSPECIFIED CONVULSIONS: ICD-10-CM

## 2025-01-23 LAB
ADD ON TEST-SPECIMEN IN LAB: SIGNIFICANT CHANGE UP
ALBUMIN SERPL ELPH-MCNC: 3.4 G/DL — SIGNIFICANT CHANGE UP (ref 3.3–5)
ALBUMIN SERPL ELPH-MCNC: 3.5 G/DL — SIGNIFICANT CHANGE UP (ref 3.3–5)
ALBUMIN SERPL ELPH-MCNC: 3.7 G/DL — SIGNIFICANT CHANGE UP (ref 3.3–5)
ALP SERPL-CCNC: 162 U/L — HIGH (ref 40–120)
ALP SERPL-CCNC: 172 U/L — HIGH (ref 40–120)
ALP SERPL-CCNC: 194 U/L — HIGH (ref 40–120)
ALT FLD-CCNC: 142 U/L — HIGH (ref 10–45)
ALT FLD-CCNC: 156 U/L — HIGH (ref 10–45)
ALT FLD-CCNC: 170 U/L — HIGH (ref 10–45)
ANION GAP SERPL CALC-SCNC: 14 MMOL/L — SIGNIFICANT CHANGE UP (ref 5–17)
ANION GAP SERPL CALC-SCNC: 15 MMOL/L — SIGNIFICANT CHANGE UP (ref 5–17)
ANION GAP SERPL CALC-SCNC: 16 MMOL/L — SIGNIFICANT CHANGE UP (ref 5–17)
APPEARANCE UR: CLEAR — SIGNIFICANT CHANGE UP
APPEARANCE UR: CLEAR — SIGNIFICANT CHANGE UP
APTT BLD: 32.1 SEC — SIGNIFICANT CHANGE UP (ref 24.5–35.6)
AST SERPL-CCNC: 117 U/L — HIGH (ref 10–40)
AST SERPL-CCNC: 139 U/L — HIGH (ref 10–40)
AST SERPL-CCNC: 149 U/L — HIGH (ref 10–40)
BACTERIA # UR AUTO: NEGATIVE /HPF — SIGNIFICANT CHANGE UP
BACTERIA # UR AUTO: NEGATIVE /HPF — SIGNIFICANT CHANGE UP
BASOPHILS # BLD AUTO: 0 K/UL — SIGNIFICANT CHANGE UP (ref 0–0.2)
BASOPHILS # BLD AUTO: 0.03 K/UL — SIGNIFICANT CHANGE UP (ref 0–0.2)
BASOPHILS NFR BLD AUTO: 0 % — SIGNIFICANT CHANGE UP (ref 0–2)
BASOPHILS NFR BLD AUTO: 0.3 % — SIGNIFICANT CHANGE UP (ref 0–2)
BILIRUB SERPL-MCNC: 0.4 MG/DL — SIGNIFICANT CHANGE UP (ref 0.2–1.2)
BILIRUB SERPL-MCNC: 0.7 MG/DL — SIGNIFICANT CHANGE UP (ref 0.2–1.2)
BILIRUB SERPL-MCNC: 0.8 MG/DL — SIGNIFICANT CHANGE UP (ref 0.2–1.2)
BILIRUB UR-MCNC: NEGATIVE — SIGNIFICANT CHANGE UP
BILIRUB UR-MCNC: NEGATIVE — SIGNIFICANT CHANGE UP
BUN SERPL-MCNC: 38 MG/DL — HIGH (ref 7–23)
BUN SERPL-MCNC: 38 MG/DL — HIGH (ref 7–23)
BUN SERPL-MCNC: 45 MG/DL — HIGH (ref 7–23)
BURR CELLS BLD QL SMEAR: PRESENT — SIGNIFICANT CHANGE UP
CALCIUM SERPL-MCNC: 8.9 MG/DL — SIGNIFICANT CHANGE UP (ref 8.4–10.5)
CALCIUM SERPL-MCNC: 9.7 MG/DL — SIGNIFICANT CHANGE UP (ref 8.4–10.5)
CALCIUM SERPL-MCNC: 9.7 MG/DL — SIGNIFICANT CHANGE UP (ref 8.4–10.5)
CAST: 6 /LPF — HIGH (ref 0–4)
CHLORIDE SERPL-SCNC: 104 MMOL/L — SIGNIFICANT CHANGE UP (ref 96–108)
CHLORIDE SERPL-SCNC: 105 MMOL/L — SIGNIFICANT CHANGE UP (ref 96–108)
CHLORIDE SERPL-SCNC: 106 MMOL/L — SIGNIFICANT CHANGE UP (ref 96–108)
CO2 SERPL-SCNC: 17 MMOL/L — LOW (ref 22–31)
CO2 SERPL-SCNC: 19 MMOL/L — LOW (ref 22–31)
CO2 SERPL-SCNC: 20 MMOL/L — LOW (ref 22–31)
COLOR SPEC: YELLOW — SIGNIFICANT CHANGE UP
COLOR SPEC: YELLOW — SIGNIFICANT CHANGE UP
CREAT SERPL-MCNC: 2.08 MG/DL — HIGH (ref 0.5–1.3)
CREAT SERPL-MCNC: 2.09 MG/DL — HIGH (ref 0.5–1.3)
CREAT SERPL-MCNC: 2.69 MG/DL — HIGH (ref 0.5–1.3)
DACRYOCYTES BLD QL SMEAR: SLIGHT — SIGNIFICANT CHANGE UP
DIFF PNL FLD: ABNORMAL
DIFF PNL FLD: ABNORMAL
EGFR: 25 ML/MIN/1.73M2 — LOW
EGFR: 34 ML/MIN/1.73M2 — LOW
EGFR: 34 ML/MIN/1.73M2 — LOW
EOSINOPHIL # BLD AUTO: 0 K/UL — SIGNIFICANT CHANGE UP (ref 0–0.5)
EOSINOPHIL # BLD AUTO: 0 K/UL — SIGNIFICANT CHANGE UP (ref 0–0.5)
EOSINOPHIL NFR BLD AUTO: 0 % — SIGNIFICANT CHANGE UP (ref 0–6)
EOSINOPHIL NFR BLD AUTO: 0 % — SIGNIFICANT CHANGE UP (ref 0–6)
FLUAV AG NPH QL: DETECTED
FLUBV AG NPH QL: SIGNIFICANT CHANGE UP
GAS PNL BLDA: SIGNIFICANT CHANGE UP
GAS PNL BLDV: SIGNIFICANT CHANGE UP
GLUCOSE BLDC GLUCOMTR-MCNC: 152 MG/DL — HIGH (ref 70–99)
GLUCOSE BLDC GLUCOMTR-MCNC: 153 MG/DL — HIGH (ref 70–99)
GLUCOSE BLDC GLUCOMTR-MCNC: 244 MG/DL — HIGH (ref 70–99)
GLUCOSE SERPL-MCNC: 114 MG/DL — HIGH (ref 70–99)
GLUCOSE SERPL-MCNC: 143 MG/DL — HIGH (ref 70–99)
GLUCOSE SERPL-MCNC: 157 MG/DL — HIGH (ref 70–99)
GLUCOSE UR QL: 250 MG/DL
GLUCOSE UR QL: 500 MG/DL
HCT VFR BLD CALC: 33.1 % — LOW (ref 39–50)
HCT VFR BLD CALC: 37.8 % — LOW (ref 39–50)
HCT VFR BLD CALC: 38.4 % — LOW (ref 39–50)
HGB BLD-MCNC: 10.3 G/DL — LOW (ref 13–17)
HGB BLD-MCNC: 12 G/DL — LOW (ref 13–17)
HGB BLD-MCNC: 12.4 G/DL — LOW (ref 13–17)
IMM GRANULOCYTES NFR BLD AUTO: 1.7 % — HIGH (ref 0–0.9)
INR BLD: 1.02 RATIO — SIGNIFICANT CHANGE UP (ref 0.85–1.16)
KETONES UR-MCNC: ABNORMAL MG/DL
KETONES UR-MCNC: NEGATIVE MG/DL — SIGNIFICANT CHANGE UP
LACTATE SERPL-SCNC: 0.9 MMOL/L — SIGNIFICANT CHANGE UP (ref 0.5–2)
LEUKOCYTE ESTERASE UR-ACNC: NEGATIVE — SIGNIFICANT CHANGE UP
LEUKOCYTE ESTERASE UR-ACNC: NEGATIVE — SIGNIFICANT CHANGE UP
LYMPHOCYTES # BLD AUTO: 0.46 K/UL — LOW (ref 1–3.3)
LYMPHOCYTES # BLD AUTO: 0.5 K/UL — LOW (ref 1–3.3)
LYMPHOCYTES # BLD AUTO: 3.5 % — LOW (ref 13–44)
LYMPHOCYTES # BLD AUTO: 4.5 % — LOW (ref 13–44)
MAGNESIUM SERPL-MCNC: 2.2 MG/DL — SIGNIFICANT CHANGE UP (ref 1.6–2.6)
MAGNESIUM SERPL-MCNC: 2.2 MG/DL — SIGNIFICANT CHANGE UP (ref 1.6–2.6)
MANUAL SMEAR VERIFICATION: SIGNIFICANT CHANGE UP
MCHC RBC-ENTMCNC: 30.9 PG — SIGNIFICANT CHANGE UP (ref 27–34)
MCHC RBC-ENTMCNC: 31 PG — SIGNIFICANT CHANGE UP (ref 27–34)
MCHC RBC-ENTMCNC: 31.1 G/DL — LOW (ref 32–36)
MCHC RBC-ENTMCNC: 31.7 G/DL — LOW (ref 32–36)
MCHC RBC-ENTMCNC: 31.8 PG — SIGNIFICANT CHANGE UP (ref 27–34)
MCHC RBC-ENTMCNC: 32.3 G/DL — SIGNIFICANT CHANGE UP (ref 32–36)
MCV RBC AUTO: 97.4 FL — SIGNIFICANT CHANGE UP (ref 80–100)
MCV RBC AUTO: 98.5 FL — SIGNIFICANT CHANGE UP (ref 80–100)
MCV RBC AUTO: 99.7 FL — SIGNIFICANT CHANGE UP (ref 80–100)
METAMYELOCYTES # FLD: 2.6 % — HIGH (ref 0–0)
METAMYELOCYTES NFR BLD: 2.6 % — HIGH (ref 0–0)
MONOCYTES # BLD AUTO: 0.71 K/UL — SIGNIFICANT CHANGE UP (ref 0–0.9)
MONOCYTES # BLD AUTO: 0.81 K/UL — SIGNIFICANT CHANGE UP (ref 0–0.9)
MONOCYTES NFR BLD AUTO: 6.1 % — SIGNIFICANT CHANGE UP (ref 2–14)
MONOCYTES NFR BLD AUTO: 6.4 % — SIGNIFICANT CHANGE UP (ref 2–14)
NEUTROPHILS # BLD AUTO: 11.6 K/UL — HIGH (ref 1.8–7.4)
NEUTROPHILS # BLD AUTO: 9.75 K/UL — HIGH (ref 1.8–7.4)
NEUTROPHILS NFR BLD AUTO: 80 % — HIGH (ref 43–77)
NEUTROPHILS NFR BLD AUTO: 87.1 % — HIGH (ref 43–77)
NEUTS BAND # BLD: 7.8 % — SIGNIFICANT CHANGE UP (ref 0–8)
NEUTS BAND NFR BLD: 7.8 % — SIGNIFICANT CHANGE UP (ref 0–8)
NITRITE UR-MCNC: NEGATIVE — SIGNIFICANT CHANGE UP
NITRITE UR-MCNC: NEGATIVE — SIGNIFICANT CHANGE UP
NRBC # BLD: 0 /100 WBCS — SIGNIFICANT CHANGE UP (ref 0–0)
NRBC # BLD: 0 /100 WBCS — SIGNIFICANT CHANGE UP (ref 0–0)
NRBC BLD-RTO: 0 /100 WBCS — SIGNIFICANT CHANGE UP (ref 0–0)
NRBC BLD-RTO: 0 /100 WBCS — SIGNIFICANT CHANGE UP (ref 0–0)
NT-PROBNP SERPL-SCNC: 3949 PG/ML — HIGH (ref 0–300)
OVALOCYTES BLD QL SMEAR: SLIGHT — SIGNIFICANT CHANGE UP
PH UR: 5.5 — SIGNIFICANT CHANGE UP (ref 5–8)
PH UR: 5.5 — SIGNIFICANT CHANGE UP (ref 5–8)
PHOSPHATE SERPL-MCNC: 3.5 MG/DL — SIGNIFICANT CHANGE UP (ref 2.5–4.5)
PHOSPHATE SERPL-MCNC: 4.7 MG/DL — HIGH (ref 2.5–4.5)
PLAT MORPH BLD: NORMAL — SIGNIFICANT CHANGE UP
PLATELET # BLD AUTO: 106 K/UL — LOW (ref 150–400)
PLATELET # BLD AUTO: 123 K/UL — LOW (ref 150–400)
PLATELET # BLD AUTO: 135 K/UL — LOW (ref 150–400)
POIKILOCYTOSIS BLD QL AUTO: SLIGHT — SIGNIFICANT CHANGE UP
POLYCHROMASIA BLD QL SMEAR: SLIGHT — SIGNIFICANT CHANGE UP
POTASSIUM SERPL-MCNC: 4.3 MMOL/L — SIGNIFICANT CHANGE UP (ref 3.5–5.3)
POTASSIUM SERPL-MCNC: 4.8 MMOL/L — SIGNIFICANT CHANGE UP (ref 3.5–5.3)
POTASSIUM SERPL-MCNC: 5.2 MMOL/L — SIGNIFICANT CHANGE UP (ref 3.5–5.3)
POTASSIUM SERPL-SCNC: 4.3 MMOL/L — SIGNIFICANT CHANGE UP (ref 3.5–5.3)
POTASSIUM SERPL-SCNC: 4.8 MMOL/L — SIGNIFICANT CHANGE UP (ref 3.5–5.3)
POTASSIUM SERPL-SCNC: 5.2 MMOL/L — SIGNIFICANT CHANGE UP (ref 3.5–5.3)
PROCALCITONIN SERPL-MCNC: 0.86 NG/ML — HIGH (ref 0.02–0.1)
PROCALCITONIN SERPL-MCNC: 4.61 NG/ML — HIGH (ref 0.02–0.1)
PROT SERPL-MCNC: 6.1 G/DL — SIGNIFICANT CHANGE UP (ref 6–8.3)
PROT SERPL-MCNC: 6.6 G/DL — SIGNIFICANT CHANGE UP (ref 6–8.3)
PROT SERPL-MCNC: 6.8 G/DL — SIGNIFICANT CHANGE UP (ref 6–8.3)
PROT UR-MCNC: 300 MG/DL
PROT UR-MCNC: >=1000 MG/DL
PROTHROM AB SERPL-ACNC: 11.6 SEC — SIGNIFICANT CHANGE UP (ref 9.9–13.4)
RBC # BLD: 3.32 M/UL — LOW (ref 4.2–5.8)
RBC # BLD: 3.88 M/UL — LOW (ref 4.2–5.8)
RBC # BLD: 3.9 M/UL — LOW (ref 4.2–5.8)
RBC # FLD: 12.8 % — SIGNIFICANT CHANGE UP (ref 10.3–14.5)
RBC # FLD: 13 % — SIGNIFICANT CHANGE UP (ref 10.3–14.5)
RBC # FLD: 13 % — SIGNIFICANT CHANGE UP (ref 10.3–14.5)
RBC BLD AUTO: ABNORMAL
RBC CASTS # UR COMP ASSIST: 0 /HPF — SIGNIFICANT CHANGE UP (ref 0–4)
RBC CASTS # UR COMP ASSIST: 2 /HPF — SIGNIFICANT CHANGE UP (ref 0–4)
REVIEW: SIGNIFICANT CHANGE UP
RSV RNA NPH QL NAA+NON-PROBE: SIGNIFICANT CHANGE UP
SARS-COV-2 RNA SPEC QL NAA+PROBE: SIGNIFICANT CHANGE UP
SODIUM SERPL-SCNC: 138 MMOL/L — SIGNIFICANT CHANGE UP (ref 135–145)
SODIUM SERPL-SCNC: 139 MMOL/L — SIGNIFICANT CHANGE UP (ref 135–145)
SODIUM SERPL-SCNC: 139 MMOL/L — SIGNIFICANT CHANGE UP (ref 135–145)
SP GR SPEC: 1.02 — SIGNIFICANT CHANGE UP (ref 1–1.03)
SP GR SPEC: 1.02 — SIGNIFICANT CHANGE UP (ref 1–1.03)
SQUAMOUS # UR AUTO: 4 /HPF — SIGNIFICANT CHANGE UP (ref 0–5)
UROBILINOGEN FLD QL: 0.2 MG/DL — SIGNIFICANT CHANGE UP (ref 0.2–1)
UROBILINOGEN FLD QL: 0.2 MG/DL — SIGNIFICANT CHANGE UP (ref 0.2–1)
WBC # BLD: 11.18 K/UL — HIGH (ref 3.8–10.5)
WBC # BLD: 13.21 K/UL — HIGH (ref 3.8–10.5)
WBC # BLD: 6.42 K/UL — SIGNIFICANT CHANGE UP (ref 3.8–10.5)
WBC # FLD AUTO: 11.18 K/UL — HIGH (ref 3.8–10.5)
WBC # FLD AUTO: 13.21 K/UL — HIGH (ref 3.8–10.5)
WBC # FLD AUTO: 6.42 K/UL — SIGNIFICANT CHANGE UP (ref 3.8–10.5)
WBC UR QL: 1 /HPF — SIGNIFICANT CHANGE UP (ref 0–5)
WBC UR QL: 2 /HPF — SIGNIFICANT CHANGE UP (ref 0–5)

## 2025-01-23 PROCEDURE — 93010 ELECTROCARDIOGRAM REPORT: CPT | Mod: 77

## 2025-01-23 PROCEDURE — 71045 X-RAY EXAM CHEST 1 VIEW: CPT | Mod: 26

## 2025-01-23 PROCEDURE — 71045 X-RAY EXAM CHEST 1 VIEW: CPT | Mod: 26,77

## 2025-01-23 PROCEDURE — 93010 ELECTROCARDIOGRAM REPORT: CPT

## 2025-01-23 PROCEDURE — 93308 TTE F-UP OR LMTD: CPT | Mod: 26,GC

## 2025-01-23 PROCEDURE — 76604 US EXAM CHEST: CPT | Mod: 26,GC

## 2025-01-23 PROCEDURE — 99291 CRITICAL CARE FIRST HOUR: CPT | Mod: 25

## 2025-01-23 PROCEDURE — 93970 EXTREMITY STUDY: CPT | Mod: 26

## 2025-01-23 RX ORDER — AMLODIPINE BESYLATE 5 MG
10 TABLET ORAL DAILY
Refills: 0 | Status: DISCONTINUED | OUTPATIENT
Start: 2025-01-23 | End: 2025-01-23

## 2025-01-23 RX ORDER — PIPERACILLIN SODIUM AND TAZOBACTAM SODIUM 2; 250 G/50ML; MG/50ML
3.38 INJECTION, POWDER, FOR SOLUTION INTRAVENOUS EVERY 8 HOURS
Refills: 0 | Status: DISCONTINUED | OUTPATIENT
Start: 2025-01-23 | End: 2025-01-24

## 2025-01-23 RX ORDER — PIPERACILLIN SODIUM AND TAZOBACTAM SODIUM 2; 250 G/50ML; MG/50ML
3.38 INJECTION, POWDER, FOR SOLUTION INTRAVENOUS ONCE
Refills: 0 | Status: COMPLETED | OUTPATIENT
Start: 2025-01-23 | End: 2025-01-23

## 2025-01-23 RX ORDER — METOPROLOL SUCCINATE 25 MG
5 TABLET, EXTENDED RELEASE 24 HR ORAL ONCE
Refills: 0 | Status: COMPLETED | OUTPATIENT
Start: 2025-01-23 | End: 2025-01-23

## 2025-01-23 RX ORDER — FENTANYL CITRATE 50 UG/ML
25 INJECTION INTRAMUSCULAR; INTRAVENOUS EVERY 4 HOURS
Refills: 0 | Status: DISCONTINUED | OUTPATIENT
Start: 2025-01-23 | End: 2025-01-28

## 2025-01-23 RX ORDER — IPRATROPIUM BROMIDE AND ALBUTEROL SULFATE .5; 2.5 MG/3ML; MG/3ML
3 SOLUTION RESPIRATORY (INHALATION) ONCE
Refills: 0 | Status: COMPLETED | OUTPATIENT
Start: 2025-01-23 | End: 2025-01-23

## 2025-01-23 RX ORDER — LURASIDONE HYDROCHLORIDE 80 MG/1
40 TABLET, FILM COATED ORAL DAILY
Refills: 0 | Status: DISCONTINUED | OUTPATIENT
Start: 2025-01-23 | End: 2025-01-29

## 2025-01-23 RX ORDER — BACTERIOSTATIC SODIUM CHLORIDE 0.9 %
4 VIAL (ML) INJECTION EVERY 12 HOURS
Refills: 0 | Status: DISCONTINUED | OUTPATIENT
Start: 2025-01-23 | End: 2025-01-27

## 2025-01-23 RX ORDER — ESCITALOPRAM 10 MG/1
15 TABLET, FILM COATED ORAL DAILY
Refills: 0 | Status: DISCONTINUED | OUTPATIENT
Start: 2025-01-23 | End: 2025-01-29

## 2025-01-23 RX ORDER — BUMETANIDE 2 MG/1
2 TABLET ORAL EVERY 8 HOURS
Refills: 0 | Status: DISCONTINUED | OUTPATIENT
Start: 2025-01-23 | End: 2025-01-24

## 2025-01-23 RX ORDER — HYDRALAZINE HCL 100 MG
10 TABLET ORAL ONCE
Refills: 0 | Status: COMPLETED | OUTPATIENT
Start: 2025-01-23 | End: 2025-01-23

## 2025-01-23 RX ORDER — HEPARIN SODIUM,PORCINE 10000/ML
7500 VIAL (ML) INJECTION EVERY 12 HOURS
Refills: 0 | Status: DISCONTINUED | OUTPATIENT
Start: 2025-01-23 | End: 2025-01-27

## 2025-01-23 RX ORDER — CEFEPIME HCL 1 G
IV SOLUTION, PIGGYBACK, BOTTLE (EA) INTRAVENOUS
Refills: 0 | Status: DISCONTINUED | OUTPATIENT
Start: 2025-01-23 | End: 2025-01-23

## 2025-01-23 RX ORDER — BUMETANIDE 2 MG/1
2 TABLET ORAL ONCE
Refills: 0 | Status: DISCONTINUED | OUTPATIENT
Start: 2025-01-23 | End: 2025-01-23

## 2025-01-23 RX ORDER — ACETAMINOPHEN 160 MG/5ML
1000 SUSPENSION ORAL ONCE
Refills: 0 | Status: COMPLETED | OUTPATIENT
Start: 2025-01-23 | End: 2025-01-23

## 2025-01-23 RX ORDER — ANTISEPTIC SURGICAL SCRUB 0.04 MG/ML
15 SOLUTION TOPICAL EVERY 12 HOURS
Refills: 0 | Status: DISCONTINUED | OUTPATIENT
Start: 2025-01-23 | End: 2025-01-28

## 2025-01-23 RX ORDER — NOREPINEPHRINE BITARTRATE 1 MG/ML
0.05 INJECTION, SOLUTION, CONCENTRATE INTRAVENOUS
Qty: 8 | Refills: 0 | Status: DISCONTINUED | OUTPATIENT
Start: 2025-01-23 | End: 2025-01-26

## 2025-01-23 RX ORDER — FENTANYL CITRATE 50 UG/ML
100 INJECTION INTRAMUSCULAR; INTRAVENOUS ONCE
Refills: 0 | Status: DISCONTINUED | OUTPATIENT
Start: 2025-01-23 | End: 2025-01-23

## 2025-01-23 RX ORDER — ATORVASTATIN CALCIUM 80 MG/1
20 TABLET, FILM COATED ORAL AT BEDTIME
Refills: 0 | Status: DISCONTINUED | OUTPATIENT
Start: 2025-01-23 | End: 2025-01-29

## 2025-01-23 RX ORDER — DEXMEDETOMIDINE HYDROCHLORIDE 4 UG/ML
0.5 INJECTION, SOLUTION INTRAVENOUS
Qty: 200 | Refills: 0 | Status: DISCONTINUED | OUTPATIENT
Start: 2025-01-23 | End: 2025-01-26

## 2025-01-23 RX ORDER — LABETALOL HYDROCHLORIDE 300 MG/1
10 TABLET, FILM COATED ORAL ONCE
Refills: 0 | Status: COMPLETED | OUTPATIENT
Start: 2025-01-23 | End: 2025-01-23

## 2025-01-23 RX ORDER — ANTISEPTIC SURGICAL SCRUB 0.04 MG/ML
1 SOLUTION TOPICAL
Refills: 0 | Status: DISCONTINUED | OUTPATIENT
Start: 2025-01-23 | End: 2025-02-06

## 2025-01-23 RX ORDER — GABAPENTIN 800 MG/1
150 TABLET ORAL
Refills: 0 | Status: DISCONTINUED | OUTPATIENT
Start: 2025-01-23 | End: 2025-01-28

## 2025-01-23 RX ORDER — POLYETHYLENE GLYCOL 3350 17 G/17G
17 POWDER, FOR SOLUTION ORAL DAILY
Refills: 0 | Status: DISCONTINUED | OUTPATIENT
Start: 2025-01-23 | End: 2025-01-29

## 2025-01-23 RX ORDER — CEFEPIME HCL 1 G
2000 IV SOLUTION, PIGGYBACK, BOTTLE (EA) INTRAVENOUS ONCE
Refills: 0 | Status: DISCONTINUED | OUTPATIENT
Start: 2025-01-23 | End: 2025-01-23

## 2025-01-23 RX ORDER — ACETAMINOPHEN 160 MG/5ML
650 SUSPENSION ORAL EVERY 6 HOURS
Refills: 0 | Status: DISCONTINUED | OUTPATIENT
Start: 2025-01-23 | End: 2025-01-23

## 2025-01-23 RX ORDER — BUMETANIDE 2 MG/1
2 TABLET ORAL ONCE
Refills: 0 | Status: COMPLETED | OUTPATIENT
Start: 2025-01-23 | End: 2025-01-23

## 2025-01-23 RX ORDER — PROPOFOL 10 MG/ML
50 INJECTION, EMULSION INTRAVENOUS
Qty: 1000 | Refills: 0 | Status: DISCONTINUED | OUTPATIENT
Start: 2025-01-23 | End: 2025-01-26

## 2025-01-23 RX ORDER — BUMETANIDE 2 MG/1
4 TABLET ORAL ONCE
Refills: 0 | Status: DISCONTINUED | OUTPATIENT
Start: 2025-01-23 | End: 2025-01-23

## 2025-01-23 RX ORDER — LACOSAMIDE 200 MG/1
100 TABLET, FILM COATED ORAL EVERY 12 HOURS
Refills: 0 | Status: DISCONTINUED | OUTPATIENT
Start: 2025-01-23 | End: 2025-02-06

## 2025-01-23 RX ORDER — IPRATROPIUM BROMIDE AND ALBUTEROL SULFATE .5; 2.5 MG/3ML; MG/3ML
3 SOLUTION RESPIRATORY (INHALATION) EVERY 6 HOURS
Refills: 0 | Status: DISCONTINUED | OUTPATIENT
Start: 2025-01-23 | End: 2025-02-06

## 2025-01-23 RX ORDER — CEFEPIME HCL 1 G
2000 IV SOLUTION, PIGGYBACK, BOTTLE (EA) INTRAVENOUS EVERY 12 HOURS
Refills: 0 | Status: DISCONTINUED | OUTPATIENT
Start: 2025-01-23 | End: 2025-01-23

## 2025-01-23 RX ORDER — SENNOSIDES 8.6 MG
10 TABLET ORAL AT BEDTIME
Refills: 0 | Status: DISCONTINUED | OUTPATIENT
Start: 2025-01-23 | End: 2025-01-29

## 2025-01-23 RX ORDER — BUMETANIDE 2 MG/1
1 TABLET ORAL
Refills: 0 | Status: DISCONTINUED | OUTPATIENT
Start: 2025-01-23 | End: 2025-01-23

## 2025-01-23 RX ORDER — PROPOFOL 10 MG/ML
50 INJECTION, EMULSION INTRAVENOUS
Qty: 1000 | Refills: 0 | Status: DISCONTINUED | OUTPATIENT
Start: 2025-01-23 | End: 2025-01-23

## 2025-01-23 RX ADMIN — Medication 1 APPLICATION(S): at 05:28

## 2025-01-23 RX ADMIN — IPRATROPIUM BROMIDE AND ALBUTEROL SULFATE 3 MILLILITER(S): .5; 2.5 SOLUTION RESPIRATORY (INHALATION) at 07:00

## 2025-01-23 RX ADMIN — LACOSAMIDE 120 MILLIGRAM(S): 200 TABLET, FILM COATED ORAL at 18:33

## 2025-01-23 RX ADMIN — GABAPENTIN 300 MILLIGRAM(S): 800 TABLET ORAL at 05:27

## 2025-01-23 RX ADMIN — Medication 4 MILLILITER(S): at 17:30

## 2025-01-23 RX ADMIN — LIDOCAINE HYDROCHLORIDE 1 PATCH: 30 CREAM TOPICAL at 07:59

## 2025-01-23 RX ADMIN — Medication 4 MILLILITER(S): at 05:26

## 2025-01-23 RX ADMIN — Medication 5 MILLIGRAM(S): at 05:28

## 2025-01-23 RX ADMIN — ATORVASTATIN CALCIUM 20 MILLIGRAM(S): 80 TABLET, FILM COATED ORAL at 21:03

## 2025-01-23 RX ADMIN — BUMETANIDE 2 MILLIGRAM(S): 2 TABLET ORAL at 22:15

## 2025-01-23 RX ADMIN — ACETAMINOPHEN 1000 MILLIGRAM(S): 160 SUSPENSION ORAL at 07:30

## 2025-01-23 RX ADMIN — FENTANYL CITRATE 100 MICROGRAM(S): 50 INJECTION INTRAMUSCULAR; INTRAVENOUS at 19:45

## 2025-01-23 RX ADMIN — BUMETANIDE 2 MILLIGRAM(S): 2 TABLET ORAL at 07:00

## 2025-01-23 RX ADMIN — PIPERACILLIN SODIUM AND TAZOBACTAM SODIUM 25 GRAM(S): 2; 250 INJECTION, POWDER, FOR SOLUTION INTRAVENOUS at 21:03

## 2025-01-23 RX ADMIN — LAMOTRIGINE 100 MILLIGRAM(S): 100 TABLET ORAL at 05:28

## 2025-01-23 RX ADMIN — ANTISEPTIC SURGICAL SCRUB 15 MILLILITER(S): 0.04 SOLUTION TOPICAL at 18:05

## 2025-01-23 RX ADMIN — PROPOFOL 31 MICROGRAM(S)/KG/MIN: 10 INJECTION, EMULSION INTRAVENOUS at 21:04

## 2025-01-23 RX ADMIN — BUMETANIDE 1 MILLIGRAM(S): 2 TABLET ORAL at 18:36

## 2025-01-23 RX ADMIN — LABETALOL HYDROCHLORIDE 10 MILLIGRAM(S): 300 TABLET, FILM COATED ORAL at 01:06

## 2025-01-23 RX ADMIN — IPRATROPIUM BROMIDE AND ALBUTEROL SULFATE 3 MILLILITER(S): .5; 2.5 SOLUTION RESPIRATORY (INHALATION) at 23:51

## 2025-01-23 RX ADMIN — Medication 7500 UNIT(S): at 05:27

## 2025-01-23 RX ADMIN — LACOSAMIDE 120 MILLIGRAM(S): 200 TABLET, FILM COATED ORAL at 05:28

## 2025-01-23 RX ADMIN — FENTANYL CITRATE 100 MICROGRAM(S): 50 INJECTION INTRAMUSCULAR; INTRAVENOUS at 15:02

## 2025-01-23 RX ADMIN — LIDOCAINE HYDROCHLORIDE 1 PATCH: 30 CREAM TOPICAL at 05:26

## 2025-01-23 RX ADMIN — PIPERACILLIN SODIUM AND TAZOBACTAM SODIUM 25 GRAM(S): 2; 250 INJECTION, POWDER, FOR SOLUTION INTRAVENOUS at 09:44

## 2025-01-23 RX ADMIN — ACETAMINOPHEN 400 MILLIGRAM(S): 160 SUSPENSION ORAL at 12:44

## 2025-01-23 RX ADMIN — GABAPENTIN 300 MILLIGRAM(S): 800 TABLET ORAL at 18:05

## 2025-01-23 RX ADMIN — LAMOTRIGINE 100 MILLIGRAM(S): 100 TABLET ORAL at 18:05

## 2025-01-23 RX ADMIN — ACETAMINOPHEN 1000 MILLIGRAM(S): 160 SUSPENSION ORAL at 00:08

## 2025-01-23 RX ADMIN — FENTANYL CITRATE 100 MICROGRAM(S): 50 INJECTION INTRAMUSCULAR; INTRAVENOUS at 15:35

## 2025-01-23 RX ADMIN — Medication 2 MILLIGRAM(S): at 15:28

## 2025-01-23 RX ADMIN — IPRATROPIUM BROMIDE AND ALBUTEROL SULFATE 3 MILLILITER(S): .5; 2.5 SOLUTION RESPIRATORY (INHALATION) at 17:30

## 2025-01-23 RX ADMIN — NOREPINEPHRINE BITARTRATE 9.68 MICROGRAM(S)/KG/MIN: 1 INJECTION, SOLUTION, CONCENTRATE INTRAVENOUS at 18:34

## 2025-01-23 RX ADMIN — LEVETIRACETAM 400 MILLIGRAM(S): 750 TABLET, FILM COATED ORAL at 18:05

## 2025-01-23 RX ADMIN — IPRATROPIUM BROMIDE AND ALBUTEROL SULFATE 3 MILLILITER(S): .5; 2.5 SOLUTION RESPIRATORY (INHALATION) at 05:26

## 2025-01-23 RX ADMIN — FENTANYL CITRATE 100 MICROGRAM(S): 50 INJECTION INTRAMUSCULAR; INTRAVENOUS at 19:29

## 2025-01-23 RX ADMIN — Medication 7500 UNIT(S): at 18:05

## 2025-01-23 RX ADMIN — ACETAMINOPHEN 400 MILLIGRAM(S): 160 SUSPENSION ORAL at 06:50

## 2025-01-23 RX ADMIN — Medication 5 MILLIGRAM(S): at 11:20

## 2025-01-23 RX ADMIN — LEVETIRACETAM 400 MILLIGRAM(S): 750 TABLET, FILM COATED ORAL at 05:29

## 2025-01-23 RX ADMIN — Medication 1 APPLICATION(S): at 18:05

## 2025-01-23 NOTE — CONSULT NOTE ADULT - SUBJECTIVE AND OBJECTIVE BOX
DATE OF SERVICE: 01-23-25 @ 17:14    CHIEF COMPLAINT:Patient is a 69y old  Male who presents with a chief complaint of seizure, flu, elevated trop (23 Jan 2025 15:12)      HISTORY OF PRESENT ILLNESS:  69M w/ hx of metastatic testicular cancer (s/p L orchiectomy, on etoposide/cisplatin chemo, last dose 6/2024), epilepsy (grand-mal seizures), schizophrenia, developmental delay, HTN, HLD, VAZQUEZ, stage III CKD, severe obesity, secondary hyperparathyroidism, anemia, thrombocytopenia who presented to ED for fall, possible seizure prior. Sister at bedside providing history as well. Patient reports last seizure 3 years ago, is adherent with medications (on lamotrigine and keppra), states he believes he had a seizure at home while ambulating to get mail from his mailbox. States he doesn't typically have an aura, just 'blacks out' for about 5 minutes. Notes this time was found by neighbor with abrasion to forehead, R cheek. Unsure time down, denies tongue-biting, b/b incontinence. Normally ambulates with cane. Per ED triage note, on EMS arrival, appeared in post-ictal state. Endorsing R sided chest pressure, and neck tenderness post fall. States has been in normal state of health, eating/drinking well, did have some hot flashes/chills last night, but did not take temperature. Denies any f/c, cough, SOB, rhinorrhea, sore throat, or any other acute sx.     In ED received 100 mg lamotrigine, keppra 1g. CTH/C spine/MF largely unremarkable apart from  mild right supraorbital and premaxillary/buccal soft tissue swelling/hematomas. CXR without focal consolidation. CBC and BMP largely unremarkable; noted to be influenza positive. Trop elevated for which cardiology was consulted; recommended TTE. Medicine called for admission.   (16 Jan 2025 17:27)      PAST MEDICAL & SURGICAL HISTORY:  Hypertension, unspecified type      Other hyperlipidemia      History of epilepsy      Paranoid schizophrenia      Obstructive sleep apnea      Testicular cancer      H/O Spinal surgery              MEDICATIONS:  amLODIPine   Tablet 10 milliGRAM(s) Oral daily  buMETAnide Injectable 1 milliGRAM(s) IV Push two times a day  heparin   Injectable 7500 Unit(s) SubCutaneous every 12 hours  norepinephrine Infusion 0.05 MICROgram(s)/kG/Min IV Continuous <Continuous>    cefepime   IVPB 2000 milliGRAM(s) IV Intermittent once  cefepime   IVPB 2000 milliGRAM(s) IV Intermittent every 12 hours  cefepime   IVPB        albuterol/ipratropium for Nebulization 3 milliLiter(s) Nebulizer every 6 hours  sodium chloride 3%  Inhalation 4 milliLiter(s) Inhalation every 12 hours    escitalopram 15 milliGRAM(s) Oral daily  fentaNYL    Injectable 25 MICROGram(s) IV Push every 4 hours PRN  gabapentin Solution 300 milliGRAM(s) Oral two times a day  lacosamide IVPB 100 milliGRAM(s) IV Intermittent every 12 hours  lamoTRIgine 100 milliGRAM(s) Oral two times a day  levETIRAcetam  IVPB 1500 milliGRAM(s) IV Intermittent two times a day  lurasidone 40 milliGRAM(s) Oral daily  propofol Infusion 50 MICROgram(s)/kG/Min IV Continuous <Continuous>      atorvastatin 20 milliGRAM(s) Oral at bedtime    bacitracin   Ointment 1 Application(s) Topical two times a day  chlorhexidine 0.12% Liquid 15 milliLiter(s) Oral Mucosa every 12 hours  chlorhexidine 2% Cloths 1 Application(s) Topical <User Schedule>  chlorhexidine 2% Cloths 1 Application(s) Topical <User Schedule>  influenza  Vaccine (HIGH DOSE) 0.5 milliLiter(s) IntraMuscular once  lidocaine   4% Patch 1 Patch Transdermal daily  nystatin Powder 1 Application(s) Topical three times a day PRN      FAMILY HISTORY:      Non-contributory    SOCIAL HISTORY:    [ ] Tobacco  [ ] Drugs  [ ] Alcohol    Allergies    penicillin (Hives)  seasonal allergies (Unknown)    Intolerances    latex (Rash)  	    REVIEW OF SYSTEMS:  CONSTITUTIONAL: No fever  EYES: No eye pain, visual disturbances, or discharge  ENMT:  No difficulty hearing, tinnitus  NECK: No pain or stiffness  RESPIRATORY: No cough, wheezing,  CARDIOVASCULAR: No chest pain, palpitations, passing out, dizziness, or leg swelling  GASTROINTESTINAL:  No nausea, vomiting, diarrhea or constipation. No melena.  GENITOURINARY: No dysuria, hematuria  NEUROLOGICAL: No stroke like symptoms  SKIN: No burning or lesions   ENDOCRINE: No heat or cold intolerance  MUSCULOSKELETAL: No joint pain or swelling  PSYCHIATRIC: No  anxiety, mood swings  HEME/LYMPH: No bleeding gums  ALLERGY AND IMMUNOLOGIC: No hives or eczema	    All other ROS negative    PHYSICAL EXAM:  T(C): 39.2 (01-23-25 @ 15:00), Max: 39.3 (01-23-25 @ 12:23)  HR: 110 (01-23-25 @ 15:25) (90 - 147)  BP: 87/53 (01-23-25 @ 15:25) (87/53 - 223/139)  RR: 32 (01-23-25 @ 15:25) (18 - 48)  SpO2: 91% (01-23-25 @ 15:25) (90% - 97%)  Wt(kg): --  I&O's Summary    22 Jan 2025 07:01  -  23 Jan 2025 07:00  --------------------------------------------------------  IN: 0 mL / OUT: 1400 mL / NET: -1400 mL        Appearance: Normal	  HEENT:   Normal oral mucosa, EOMI	  Cardiovascular:  S1 S2, No JVD,    Respiratory: Lungs clear to auscultation	  Psychiatry: Alert  Gastrointestinal:  Soft, Non-tender, + BS	  Skin: No rashes   Neurologic: Non-focal  Extremities:  No edema  Vascular: Peripheral pulses palpable    	    	  	  CARDIAC MARKERS:  Labs personally reviewed by me                                  12.0   13.21 )-----------( 135      ( 23 Jan 2025 15:10 )             37.8     01-23    138  |  104  |  38[H]  ----------------------------<  157[H]  4.8   |  19[L]  |  2.09[H]    Ca    9.7      23 Jan 2025 07:38  Phos  3.5     01-23  Mg     2.2     01-23    TPro  6.8  /  Alb  3.7  /  TBili  0.7  /  DBili  x   /  AST  149[H]  /  ALT  170[H]  /  AlkPhos  194[H]  01-23          EKG: Personally reviewed by me - NSJEN 1/16/25  Radiology: Personally reviewed by me -   < from: Xray Chest 1 View- PORTABLE-Urgent (Xray Chest 1 View- PORTABLE-Urgent .) (01.23.25 @ 07:26) >  A right-sided port is seen with its tip in the right atrium.  The heart is not well assessed on an AP film.  There are low lung volumes. There are mild perihilar interstitial   opacities.  There are no pleural effusions.  There is no pneumothorax.    IMPRESSION:  Mild pulmonary vascular congestion.    < end of copied text >  < from: CT Chest No Cont (01.17.25 @ 20:09) >  Anterior bowing of the posterior tracheal wall, suggestive of   tracheomalacia.    Trace right pleural effusion.    < end of copied text >  < from: TTE W or WO Ultrasound Enhancing Agent (01.17.25 @ 08:30) >   1. Technically difficult image quality.   2. Left ventricular cavity is normal in size. Left ventricular systolic function is normal with an ejection fraction of 62 % by Zaman's method of disks. There are no regional wall motion abnormalities seen.   3. Normal right ventricular cavity size and normal right ventricular systolic function.   4. The cardiac valves are not well visualized. There is no evidence of significant valve disease by Doppler assessment.   5. No pericardial effusion seen.   6. Aortic root at the sinuses of Valsalva is dilated, measuring 4.20 cm (indexed 2.00 cm/m²).   7. No prior echocardiogram is available for comparison.      Assessment /Plan:     69M w/ hx of metastatic testicular cancer (s/p L orchiectomy, on etoposide/cisplatin chemo, last dose 6/2024), epilepsy (grand-mal seizures), schizophrenia, developmental delay, HTN, HLD, VAZQUEZ, stage III CKD, obesity, secondary hyperparathyroidism, anemia, thrombocytopenia, ? spinal surgery who was admitted on 1/16/25 for fall and syncope. On 1/17 patient had multiple RRTs called for grand mal seizure activity and  patient had x2 more episode and was ultimately given ativan 2mg, Vimpat load 200mg, and intubated for airway protection with MICU transfer. Extubated 1/19 - to AVAPS, post extubation with increased secretion, now better; downgraded 1/21; While on medical floors; patient developed respiratory distress; now intubated for suspected aspiration pna vs flash pulmonary edema.     Problem/Plan #1: Acute Hypoxic Respiratory Failure  - Intubated for airway protection i/s/o multiple RRTs for grand mal seizure activity  - Extubated 1/19 to AVAPS  - Now reintubated 1/23 after hypoxic episode for suspected aspiration pna vs flash pulmonary edema.   - BNP 3949  - CXR shows mild pulm edema on 1/16; Repeat CXR pending  - TTE with preserved EF, no WMA, dilated aortic root  - c/w Bumex 1mg IV BID  - c/w IV Abx as per primary team  - Monitor strict I&Os, daily weights  - Aggressive pulm toileting    Problem/Plan #2: Hypertensive Urgency  - amlodipine increased to 10mg daily  - c/w diuresis as noted above    Problem/Plan #3: HLD  - c/w atorvastatin 20mg daily    Problem/Plan #4: DVT Ppx  - c/w SQ heparin    Differential diagnosis and plan of care discussed with patient after the evaluation. Counseling on diet, nutritional counseling, weight management, exercise and medication compliance was done.   Advanced care planning/advanced directives discussed with patient/family. DNR status including forceful chest compressions to attempt to restart the heart, ventilator support/artificial breathing, electric shock, artificial nutrition, health care proxy, Molst form all discussed with pt. Pt wishes to consider. More than fifteen minutes spent on discussing advanced directives.     Magaly Laird [facility]  Gus Andrew DO [facility]  Cardiovascular Medicine  [address], [address]  Available for call or text via [employer]  Office [phone]   DATE OF SERVICE: 01-23-25 @ 17:14    CHIEF COMPLAINT:Patient is a 69y old  Male who presents with a chief complaint of seizure, flu, elevated trop (23 Jan 2025 15:12)      HISTORY OF PRESENT ILLNESS:  69M w/ hx of metastatic testicular cancer (s/p L orchiectomy, on etoposide/cisplatin chemo, last dose 6/2024), epilepsy (grand-mal seizures), schizophrenia, developmental delay, HTN, HLD, VAZQUEZ, stage III CKD, severe obesity, secondary hyperparathyroidism, anemia, thrombocytopenia who presented to ED for fall, possible seizure prior. Sister at bedside providing history as well. Patient reports last seizure 3 years ago, is adherent with medications (on lamotrigine and keppra), states he believes he had a seizure at home while ambulating to get mail from his mailbox. States he doesn't typically have an aura, just 'blacks out' for about 5 minutes. Notes this time was found by neighbor with abrasion to forehead, R cheek. Unsure time down, denies tongue-biting, b/b incontinence. Normally ambulates with cane. Per ED triage note, on EMS arrival, appeared in post-ictal state. Endorsing R sided chest pressure, and neck tenderness post fall. States has been in normal state of health, eating/drinking well, did have some hot flashes/chills last night, but did not take temperature. Denies any f/c, cough, SOB, rhinorrhea, sore throat, or any other acute sx.     In ED received 100 mg lamotrigine, keppra 1g. CTH/C spine/MF largely unremarkable apart from  mild right supraorbital and premaxillary/buccal soft tissue swelling/hematomas. CXR without focal consolidation. CBC and BMP largely unremarkable; noted to be influenza positive. Trop elevated for which cardiology was consulted; recommended TTE. Medicine called for admission.   (16 Jan 2025 17:27)      PAST MEDICAL & SURGICAL HISTORY:  Hypertension, unspecified type      Other hyperlipidemia      History of epilepsy      Paranoid schizophrenia      Obstructive sleep apnea      Testicular cancer      H/O Spinal surgery              MEDICATIONS:  amLODIPine   Tablet 10 milliGRAM(s) Oral daily  buMETAnide Injectable 1 milliGRAM(s) IV Push two times a day  heparin   Injectable 7500 Unit(s) SubCutaneous every 12 hours  norepinephrine Infusion 0.05 MICROgram(s)/kG/Min IV Continuous <Continuous>    cefepime   IVPB 2000 milliGRAM(s) IV Intermittent once  cefepime   IVPB 2000 milliGRAM(s) IV Intermittent every 12 hours  cefepime   IVPB        albuterol/ipratropium for Nebulization 3 milliLiter(s) Nebulizer every 6 hours  sodium chloride 3%  Inhalation 4 milliLiter(s) Inhalation every 12 hours    escitalopram 15 milliGRAM(s) Oral daily  fentaNYL    Injectable 25 MICROGram(s) IV Push every 4 hours PRN  gabapentin Solution 300 milliGRAM(s) Oral two times a day  lacosamide IVPB 100 milliGRAM(s) IV Intermittent every 12 hours  lamoTRIgine 100 milliGRAM(s) Oral two times a day  levETIRAcetam  IVPB 1500 milliGRAM(s) IV Intermittent two times a day  lurasidone 40 milliGRAM(s) Oral daily  propofol Infusion 50 MICROgram(s)/kG/Min IV Continuous <Continuous>      atorvastatin 20 milliGRAM(s) Oral at bedtime    bacitracin   Ointment 1 Application(s) Topical two times a day  chlorhexidine 0.12% Liquid 15 milliLiter(s) Oral Mucosa every 12 hours  chlorhexidine 2% Cloths 1 Application(s) Topical <User Schedule>  chlorhexidine 2% Cloths 1 Application(s) Topical <User Schedule>  influenza  Vaccine (HIGH DOSE) 0.5 milliLiter(s) IntraMuscular once  lidocaine   4% Patch 1 Patch Transdermal daily  nystatin Powder 1 Application(s) Topical three times a day PRN      FAMILY HISTORY:      Non-contributory    SOCIAL HISTORY:    [ ] Tobacco  [ ] Drugs  [ ] Alcohol    Allergies    penicillin (Hives)  seasonal allergies (Unknown)    Intolerances    latex (Rash)  	    REVIEW OF SYSTEMS: Unable to participate in ROS  CONSTITUTIONAL: No fever  EYES: No eye pain, visual disturbances, or discharge  ENMT:  No difficulty hearing, tinnitus  NECK: No pain or stiffness  RESPIRATORY: No cough, wheezing,  CARDIOVASCULAR: No chest pain, palpitations, passing out, dizziness, or leg swelling  GASTROINTESTINAL:  No nausea, vomiting, diarrhea or constipation. No melena.  GENITOURINARY: No dysuria, hematuria  NEUROLOGICAL: No stroke like symptoms  SKIN: No burning or lesions   ENDOCRINE: No heat or cold intolerance  MUSCULOSKELETAL: No joint pain or swelling  PSYCHIATRIC: No  anxiety, mood swings  HEME/LYMPH: No bleeding gums  ALLERGY AND IMMUNOLOGIC: No hives or eczema	    All other ROS negative    PHYSICAL EXAM:  T(C): 39.2 (01-23-25 @ 15:00), Max: 39.3 (01-23-25 @ 12:23)  HR: 110 (01-23-25 @ 15:25) (90 - 147)  BP: 87/53 (01-23-25 @ 15:25) (87/53 - 223/139)  RR: 32 (01-23-25 @ 15:25) (18 - 48)  SpO2: 91% (01-23-25 @ 15:25) (90% - 97%)  Wt(kg): --  I&O's Summary    22 Jan 2025 07:01  -  23 Jan 2025 07:00  --------------------------------------------------------  IN: 0 mL / OUT: 1400 mL / NET: -1400 mL        Appearance: Normal	  HEENT:   Normal oral mucosa, EOMI	  Cardiovascular:  S1 S2, No JVD,    Respiratory: Lungs clear to auscultation	  Psychiatry: Alert  Gastrointestinal:  Soft, Non-tender, + BS	  Skin: No rashes   Neurologic: Non-focal  Extremities:  No edema  Vascular: Peripheral pulses palpable    	    	  	  CARDIAC MARKERS:  Labs personally reviewed by me                                  12.0   13.21 )-----------( 135      ( 23 Jan 2025 15:10 )             37.8     01-23    138  |  104  |  38[H]  ----------------------------<  157[H]  4.8   |  19[L]  |  2.09[H]    Ca    9.7      23 Jan 2025 07:38  Phos  3.5     01-23  Mg     2.2     01-23    TPro  6.8  /  Alb  3.7  /  TBili  0.7  /  DBili  x   /  AST  149[H]  /  ALT  170[H]  /  AlkPhos  194[H]  01-23          EKG: Personally reviewed by me - SAJAN 1/16/25  Radiology: Personally reviewed by me -   < from: Xray Chest 1 View- PORTABLE-Urgent (Xray Chest 1 View- PORTABLE-Urgent .) (01.23.25 @ 07:26) >  A right-sided port is seen with its tip in the right atrium.  The heart is not well assessed on an AP film.  There are low lung volumes. There are mild perihilar interstitial   opacities.  There are no pleural effusions.  There is no pneumothorax.    IMPRESSION:  Mild pulmonary vascular congestion.    < end of copied text >  < from: CT Chest No Cont (01.17.25 @ 20:09) >  Anterior bowing of the posterior tracheal wall, suggestive of   tracheomalacia.    Trace right pleural effusion.    < end of copied text >  < from: TTE W or WO Ultrasound Enhancing Agent (01.17.25 @ 08:30) >   1. Technically difficult image quality.   2. Left ventricular cavity is normal in size. Left ventricular systolic function is normal with an ejection fraction of 62 % by Zaman's method of disks. There are no regional wall motion abnormalities seen.   3. Normal right ventricular cavity size and normal right ventricular systolic function.   4. The cardiac valves are not well visualized. There is no evidence of significant valve disease by Doppler assessment.   5. No pericardial effusion seen.   6. Aortic root at the sinuses of Valsalva is dilated, measuring 4.20 cm (indexed 2.00 cm/m²).   7. No prior echocardiogram is available for comparison.      Assessment /Plan:     69M w/ hx of metastatic testicular cancer (s/p L orchiectomy, on etoposide/cisplatin chemo, last dose 6/2024), epilepsy (grand-mal seizures), schizophrenia, developmental delay, HTN, HLD, VAZQUEZ, stage III CKD, obesity, secondary hyperparathyroidism, anemia, thrombocytopenia, ? spinal surgery who was admitted on 1/16/25 for fall and syncope. On 1/17 patient had multiple RRTs called for grand mal seizure activity and  patient had x2 more episode and was ultimately given ativan 2mg, Vimpat load 200mg, and intubated for airway protection with MICU transfer. Extubated 1/19 - to AVAPS, post extubation with increased secretion, now better; downgraded 1/21; While on medical floors; patient developed respiratory distress; now intubated for suspected aspiration pna vs flash pulmonary edema.     Problem/Plan #1: Acute Hypoxic Respiratory Failure  - Intubated for airway protection i/s/o multiple RRTs for grand mal seizure activity  - Extubated 1/19 to AVAPS  - Now reintubated 1/23 after hypoxic episode for suspected aspiration pna vs flash pulmonary edema.   - BNP 3949  - CXR shows mild pulm edema on 1/16; Repeat CXR pending  - TTE with preserved EF, no WMA, dilated aortic root  - c/w Bumex 1mg IV BID  - c/w IV Abx as per primary team  - Monitor strict I&Os, daily weights    Problem/Plan #2: Hypertensive Urgency  - amlodipine increased to 10mg daily  - c/w diuresis as noted above  - BP now soft. Exercise caution in further uptitrating medications.     Problem/Plan #3: HLD  - c/w atorvastatin 20mg daily    Problem/Plan #4: DVT Ppx  - c/w SQ heparin    Differential diagnosis and plan of care discussed with patient after the evaluation. Counseling on diet, nutritional counseling, weight management, exercise and medication compliance was done.   Advanced care planning/advanced directives discussed with patient/family. DNR status including forceful chest compressions to attempt to restart the heart, ventilator support/artificial breathing, electric shock, artificial nutrition, health care proxy, Molst form all discussed with pt. Pt wishes to consider. More than fifteen minutes spent on discussing advanced directives.     Magaly Laird [facility]  Gus Andrew DO [facility]  Cardiovascular Medicine  [address], [address]  Available for call or text via [employer]  Office [phone]   DATE OF SERVICE: 01-23-25 @ 17:14    CHIEF COMPLAINT:Patient is a 69y old  Male who presents with a chief complaint of seizure, flu, elevated trop (23 Jan 2025 15:12)      HISTORY OF PRESENT ILLNESS:  69M w/ hx of metastatic testicular cancer (s/p L orchiectomy, on etoposide/cisplatin chemo, last dose 6/2024), epilepsy (grand-mal seizures), schizophrenia, developmental delay, HTN, HLD, VAZQUEZ, stage III CKD, severe obesity, secondary hyperparathyroidism, anemia, thrombocytopenia who presented to ED for fall, possible seizure prior. Sister at bedside providing history as well. Patient reports last seizure 3 years ago, is adherent with medications (on lamotrigine and keppra), states he believes he had a seizure at home while ambulating to get mail from his mailbox. States he doesn't typically have an aura, just 'blacks out' for about 5 minutes. Notes this time was found by neighbor with abrasion to forehead, R cheek. Unsure time down, denies tongue-biting, b/b incontinence. Normally ambulates with cane. Per ED triage note, on EMS arrival, appeared in post-ictal state. Endorsing R sided chest pressure, and neck tenderness post fall. States has been in normal state of health, eating/drinking well, did have some hot flashes/chills last night, but did not take temperature. Denies any f/c, cough, SOB, rhinorrhea, sore throat, or any other acute sx.     In ED received 100 mg lamotrigine, keppra 1g. CTH/C spine/MF largely unremarkable apart from  mild right supraorbital and premaxillary/buccal soft tissue swelling/hematomas. CXR without focal consolidation. CBC and BMP largely unremarkable; noted to be influenza positive. Trop elevated for which cardiology was consulted; recommended TTE. Medicine called for admission.   (16 Jan 2025 17:27)      PAST MEDICAL & SURGICAL HISTORY:  Hypertension, unspecified type      Other hyperlipidemia      History of epilepsy      Paranoid schizophrenia      Obstructive sleep apnea      Testicular cancer      H/O Spinal surgery              MEDICATIONS:  amLODIPine   Tablet 10 milliGRAM(s) Oral daily  buMETAnide Injectable 1 milliGRAM(s) IV Push two times a day  heparin   Injectable 7500 Unit(s) SubCutaneous every 12 hours  norepinephrine Infusion 0.05 MICROgram(s)/kG/Min IV Continuous <Continuous>    cefepime   IVPB 2000 milliGRAM(s) IV Intermittent once  cefepime   IVPB 2000 milliGRAM(s) IV Intermittent every 12 hours  cefepime   IVPB        albuterol/ipratropium for Nebulization 3 milliLiter(s) Nebulizer every 6 hours  sodium chloride 3%  Inhalation 4 milliLiter(s) Inhalation every 12 hours    escitalopram 15 milliGRAM(s) Oral daily  fentaNYL    Injectable 25 MICROGram(s) IV Push every 4 hours PRN  gabapentin Solution 300 milliGRAM(s) Oral two times a day  lacosamide IVPB 100 milliGRAM(s) IV Intermittent every 12 hours  lamoTRIgine 100 milliGRAM(s) Oral two times a day  levETIRAcetam  IVPB 1500 milliGRAM(s) IV Intermittent two times a day  lurasidone 40 milliGRAM(s) Oral daily  propofol Infusion 50 MICROgram(s)/kG/Min IV Continuous <Continuous>      atorvastatin 20 milliGRAM(s) Oral at bedtime    bacitracin   Ointment 1 Application(s) Topical two times a day  chlorhexidine 0.12% Liquid 15 milliLiter(s) Oral Mucosa every 12 hours  chlorhexidine 2% Cloths 1 Application(s) Topical <User Schedule>  chlorhexidine 2% Cloths 1 Application(s) Topical <User Schedule>  influenza  Vaccine (HIGH DOSE) 0.5 milliLiter(s) IntraMuscular once  lidocaine   4% Patch 1 Patch Transdermal daily  nystatin Powder 1 Application(s) Topical three times a day PRN      FAMILY HISTORY:      Non-contributory    SOCIAL HISTORY:    [ ] not a smoker    Allergies    penicillin (Hives)  seasonal allergies (Unknown)    Intolerances    latex (Rash)  	    REVIEW OF SYSTEMS: Unable to participate in ROS   	    All other ROS negative    PHYSICAL EXAM:  T(C): 39.2 (01-23-25 @ 15:00), Max: 39.3 (01-23-25 @ 12:23)  HR: 110 (01-23-25 @ 15:25) (90 - 147)  BP: 87/53 (01-23-25 @ 15:25) (87/53 - 223/139)  RR: 32 (01-23-25 @ 15:25) (18 - 48)  SpO2: 91% (01-23-25 @ 15:25) (90% - 97%)  Wt(kg): --  I&O's Summary    22 Jan 2025 07:01  -  23 Jan 2025 07:00  --------------------------------------------------------  IN: 0 mL / OUT: 1400 mL / NET: -1400 mL        Appearance: Normal	  HEENT:   Normal oral mucosa, EOMI	  Cardiovascular:  S1 S2, No JVD,    Respiratory: Lungs clear to auscultation	  Psychiatry: Alert  Gastrointestinal:  Soft, Non-tender, + BS	  Skin: No rashes   Neurologic: Non-focal  Extremities:  No edema  Vascular: Peripheral pulses palpable    	    	  	  CARDIAC MARKERS:  Labs personally reviewed by me                                  12.0   13.21 )-----------( 135      ( 23 Jan 2025 15:10 )             37.8     01-23    138  |  104  |  38[H]  ----------------------------<  157[H]  4.8   |  19[L]  |  2.09[H]    Ca    9.7      23 Jan 2025 07:38  Phos  3.5     01-23  Mg     2.2     01-23    TPro  6.8  /  Alb  3.7  /  TBili  0.7  /  DBili  x   /  AST  149[H]  /  ALT  170[H]  /  AlkPhos  194[H]  01-23          EKG: Personally reviewed by me - NSR 1/16/25  Radiology: Personally reviewed by me -   < from: Xray Chest 1 View- PORTABLE-Urgent (Xray Chest 1 View- PORTABLE-Urgent .) (01.23.25 @ 07:26) >  A right-sided port is seen with its tip in the right atrium.  The heart is not well assessed on an AP film.  There are low lung volumes. There are mild perihilar interstitial   opacities.  There are no pleural effusions.  There is no pneumothorax.    IMPRESSION:  Mild pulmonary vascular congestion.    < end of copied text >  < from: CT Chest No Cont (01.17.25 @ 20:09) >  Anterior bowing of the posterior tracheal wall, suggestive of   tracheomalacia.    Trace right pleural effusion.    < end of copied text >  < from: TTE W or WO Ultrasound Enhancing Agent (01.17.25 @ 08:30) >   1. Technically difficult image quality.   2. Left ventricular cavity is normal in size. Left ventricular systolic function is normal with an ejection fraction of 62 % by Zaman's method of disks. There are no regional wall motion abnormalities seen.   3. Normal right ventricular cavity size and normal right ventricular systolic function.   4. The cardiac valves are not well visualized. There is no evidence of significant valve disease by Doppler assessment.   5. No pericardial effusion seen.   6. Aortic root at the sinuses of Valsalva is dilated, measuring 4.20 cm (indexed 2.00 cm/m²).   7. No prior echocardiogram is available for comparison.            Assessment /Plan:     69M w/ hx of metastatic testicular cancer (s/p L orchiectomy, on etoposide/cisplatin chemo, last dose 6/2024), epilepsy (grand-mal seizures), schizophrenia, developmental delay, HTN, HLD, VAZQUEZ, stage III CKD, obesity, secondary hyperparathyroidism, anemia, thrombocytopenia, ? spinal surgery who was admitted on 1/16/25 for fall and syncope. On 1/17 patient had multiple RRTs called for grand mal seizure activity and  patient had x2 more episode and was ultimately given ativan 2mg, Vimpat load 200mg, and intubated for airway protection with MICU transfer. Extubated 1/19 - to AVAPS, post extubation with increased secretion, now better; downgraded 1/21; While on medical floors; patient developed respiratory distress; now intubated for suspected aspiration pna vs flash pulmonary edema.     Problem/Plan #1: Acute Hypoxic Respiratory Failure  - Intubated for airway protection i/s/o multiple RRTs for grand mal seizure activity, Extubated 1/19 to AVAPS  - Reintubated 1/23 after hypoxic episode for suspected aspiration pna vs flash pulmonary edema.   - BNP 3949  - CXR shows mild pulm edema on 1/16; Repeat CXR pending  - TTE with preserved EF, no WMA, dilated aortic root  - c/w Bumex 1mg IV BID  - c/w IV Abx as per primary team  - Monitor strict I&Os, daily weights    Problem/Plan #2: Hypertensive Urgency  - amlodipine increased to 10mg daily  - c/w diuresis as noted above  - BP now soft. Exercise caution in further uptitrating medications.     Problem/Plan #3: HLD  - c/w atorvastatin 20mg daily    Problem/Plan #4: DVT Ppx  - c/w SQ heparin          Differential diagnosis and plan of care discussed with patient after the evaluation. Counseling on diet, nutritional counseling, weight management, exercise and medication compliance was done.   Advanced care planning/advanced directives discussed with patient/family. DNR status including forceful chest compressions to attempt to restart the heart, ventilator support/artificial breathing, electric shock, artificial nutrition, health care proxy, Molst form all discussed with pt. Pt wishes to consider. More than fifteen minutes spent on discussing advanced directives.     Magaly Laird [facility]  Gus Andrew DO [facility]  Cardiovascular Medicine  [address], [address]  Available for call or text via [employer]  Office [phone]

## 2025-01-23 NOTE — CONSULT NOTE ADULT - PROBLEM SELECTOR RECOMMENDATION 9
-S/p intubation in MICU in the setting of grand mal seizures, pneumonia, Flu  -Completed course of ABX, tamiflu for Flu. Extubated to AVAPS 1/19  -S/p RRT this AM for fevers, hypoxia requiring AVAPS  -WBC uptrending, febrile to 101F during RRT, ? aspiration event   -CXR with low lung volumes, mild congestion. F/u official report  -ABG with no CO2 retention  -Continue ABX  -Continue Duoneb, Mucomyst  -Continue AVAPS qHS and PRN for increased WOB for now, if unable to maintain sats when off AVAPS on nasal cannula suggest start HFNC. Keep sats >90%. -S/p intubation in MICU in the setting of grand mal seizures, pneumonia, Flu  -Completed course of ABX, tamiflu for Flu. Extubated to AVAPS 1/19  -S/p RRT this AM for fevers, hypoxia requiring AVAPS  -WBC uptrending, febrile to 101F during RRT, ? aspiration event   -CXR with low lung volumes, mild congestion. F/u official report  -ABG with no CO2 retention  -Continue ABX  -Continue Duoneb, Mucomyst  -Pt seen on AVAPS, sats 98% on fio2 90% but visibly labored, tachypneic to 40s, tachycardic.   -Continue AVAPS, keep sats >90%, pH >7.2  -Suggest ICU consult  -RRT called. -S/p intubation in MICU in the setting of grand mal seizures, pneumonia, Flu  -Completed course of ABX, tamiflu for Flu. Extubated to AVAPS 1/19  -S/p RRT this AM for fevers, hypoxia requiring AVAPS  -WBC uptrending, febrile to 101F during RRT, ? aspiration event   -CXR with low lung volumes, mild congestion. F/u official report  -ABG with no CO2 retention  -Continue ABX  -Continue Duoneb, Mucomyst  -Pt seen on AVAPS, o2 sat 98% on fio2 90% but visibly labored, tachypneic to 40s, tachycardic. Awake & alert, endorsing SOB   -Continue AVAPS, keep sats >90%, pH >7.2  -Suggest ICU consult  -RRT called.

## 2025-01-23 NOTE — PROGRESS NOTE ADULT - SUBJECTIVE AND OBJECTIVE BOX
[facility]  JACINTO Horton Y. Patel, S. Shah, G. Casimir  [phone]  ([phone] - weekdays after 5pm and weekends)    Name: OSCAR MILAN  Age/Gender: 69y Male  MRN: [id number]    Interval History:  Patient seen and examined this morning.   S/p RRT this am for hypoxia/inc WOB, febrile to 101F, CXR with pulm vascular congestion, s/p bumex, zosyn, started AVAPS  Patient feels better now, breathing more comfortable, denies chest pain.   Denies abdominal pain, nausea, vomiting, diarrhea; states occasional dysuria.   Notes reviewed.     Allergies: penicillin (Hives)  seasonal allergies (Unknown)      Objective:  Vitals:   T(F): 98.4 (01-23-25 @ 08:26), Max: 100.6 (01-22-25 @ 23:39)  HR: 147 (01-23-25 @ 08:26) (90 - 147)  BP: 102/71 (01-23-25 @ 08:26) (102/71 - 223/139)  RR: 18 (01-23-25 @ 08:26) (18 - 19)  SpO2: 97% (01-23-25 @ 08:26) (91% - 100%)  Physical Examination:  General: no acute distress, AVAPS  HEENT: normocephalic, anicteric, EOMI  Respiratory: decreased breath sounds b/l   Cardiovascular: S1 and S2 present, tachycardia   Gastrointestinal: obese, nontender, nondistended  Extremities: no edema, no cyanosis  Skin: no visible rash, R brow wound     Laboratory Studies:  CBC:                       12.4   11.18 )-----------( 123      ( 23 Jan 2025 07:38 )             38.4     WBC Trend:  11.18 01-23-25 @ 07:38  6.42 01-23-25 @ 00:39  4.89 01-22-25 @ 07:28  6.58 01-21-25 @ 00:43  10.49 01-20-25 @ 00:49  9.51 01-18-25 @ 23:35  9.51 01-18-25 @ 15:08  17.77 01-17-25 @ 22:20  13.82 01-17-25 @ 19:13  6.72 01-17-25 @ 07:02    CMP: 01-23    138  |  104  |  38[H]  ----------------------------<  157[H]  4.8   |  19[L]  |  2.09[H]    Ca    9.7      23 Jan 2025 07:38  Phos  3.5     01-23  Mg     2.2     01-23    TPro  6.8  /  Alb  3.7  /  TBili  0.7  /  DBili  x   /  AST  149[H]  /  ALT  170[H]  /  AlkPhos  194[H]  01-23    Creatinine: 2.09 mg/dL (01-23-25 @ 07:38)  Creatinine: 2.08 mg/dL (01-23-25 @ 00:39)  Creatinine: 2.23 mg/dL (01-22-25 @ 07:26)  Creatinine: 2.53 mg/dL (01-21-25 @ 00:43)  Creatinine: 2.49 mg/dL (01-20-25 @ 00:50)  Creatinine: 2.57 mg/dL (01-19-25 @ 19:32)  Creatinine: 3.14 mg/dL (01-18-25 @ 23:35)  Creatinine: 3.25 mg/dL (01-18-25 @ 15:08)  Creatinine: 2.56 mg/dL (01-17-25 @ 22:46)  Creatinine: 2.55 mg/dL (01-17-25 @ 19:13)  Creatinine: 2.27 mg/dL (01-17-25 @ 12:49)    LIVER FUNCTIONS - ( 23 Jan 2025 07:38 )  Alb: 3.7 g/dL / Pro: 6.8 g/dL / ALK PHOS: 194 U/L / ALT: 170 U/L / AST: 149 U/L / GGT: x           Microbiology: reviewed   Culture - Blood (collected 01-20-25 @ 01:52)  Source: .Blood BLOOD  Preliminary Report (01-23-25 @ 09:01):    No growth at 72 Hours    Culture - Blood (collected 01-19-25 @ 19:28)  Source: .Blood BLOOD  Preliminary Report (01-23-25 @ 04:01):    No growth at 72 Hours    Culture - Sputum (collected 01-18-25 @ 00:36)  Source: .Sputum Sputum  Gram Stain (01-18-25 @ 06:52):    Moderate polymorphonuclear leukocytes per low power field    No Squamous epithelial cells per low power field    No organisms seen per oil power field  Final Report (01-19-25 @ 08:17):    No growth    Culture - Blood (collected 01-17-25 @ 15:10)  Source: .Blood BLOOD  Final Report (01-22-25 @ 20:00):    No growth at 5 days    Culture - Blood (collected 01-17-25 @ 14:51)  Source: .Blood BLOOD  Final Report (01-22-25 @ 20:00):    No growth at 5 days    Radiology: reviewed     Medications:  acetaminophen     Tablet .. 650 milliGRAM(s) Oral every 6 hours PRN  acetaminophen   IVPB .. 1000 milliGRAM(s) IV Intermittent once PRN  acetylcysteine 10%  Inhalation 4 milliLiter(s) Inhalation every 6 hours  albuterol/ipratropium for Nebulization 3 milliLiter(s) Nebulizer every 6 hours  amLODIPine   Tablet 10 milliGRAM(s) Oral daily  atorvastatin 20 milliGRAM(s) Oral at bedtime  bacitracin   Ointment 1 Application(s) Topical two times a day  cefepime   IVPB 2000 milliGRAM(s) IV Intermittent once  cefepime   IVPB 2000 milliGRAM(s) IV Intermittent every 12 hours  cefepime   IVPB      chlorhexidine 2% Cloths 1 Application(s) Topical <User Schedule>  escitalopram 15 milliGRAM(s) Oral daily  gabapentin Solution 300 milliGRAM(s) Oral two times a day  heparin   Injectable 7500 Unit(s) SubCutaneous every 12 hours  influenza  Vaccine (HIGH DOSE) 0.5 milliLiter(s) IntraMuscular once  lacosamide IVPB 100 milliGRAM(s) IV Intermittent every 12 hours  lamoTRIgine 100 milliGRAM(s) Oral two times a day  levETIRAcetam  IVPB 1500 milliGRAM(s) IV Intermittent two times a day  lidocaine   4% Patch 1 Patch Transdermal daily  lurasidone 40 milliGRAM(s) Oral daily  metoprolol tartrate Injectable 5 milliGRAM(s) IV Push once  nystatin Powder 1 Application(s) Topical three times a day PRN    Current Antimicrobials:  cefepime   IVPB 2000 milliGRAM(s) IV Intermittent once  cefepime   IVPB 2000 milliGRAM(s) IV Intermittent every 12 hours  cefepime   IVPB        Prior/Completed Antimicrobials:  cefTRIAXone   IVPB  cefTRIAXone   IVPB  oseltamivir  piperacillin/tazobactam IVPB..  vancomycin  IVPB

## 2025-01-23 NOTE — CHART NOTE - NSCHARTNOTEFT_GEN_A_CORE
RRT/MICU cx    RRT/MICU cx called for increasing WOB/Tachypnea to 40-50s on AVAPS.   Patient intubated at bedside by anesthesia. Please refer to RRT note for events.   Ester Gr (sister) updated on patient's medical status. Dr. Knight aware of tx to MICU.

## 2025-01-23 NOTE — CONSULT NOTE ADULT - NS ATTEND AMEND GEN_ALL_CORE FT
Patient care and plan discussed and reviewed with Advanced Care Provider. Plan as outlined above edited by me to reflect our discussion.   In addition, I participated in    - Ordering, reviewing, and interpreting labs, testing, and imaging.  - Reviewing prior hospitalization and where necessary, outpatient records.  - Counselling and educating patient and/or family regarding interpretation of aforementioned items and plan of care.
history reviewed:  [facility] MICU attending also:  recently transferred out of MICU and today went into  resp failure again : likely aspiration;  intubated on floor transferred to MICU :  cont supportive care in MICU :     [facility] team

## 2025-01-23 NOTE — AIRWAY PLACEMENT NOTE ADULT - POST AIRWAY PLACEMENT ASSESSMENT:
breath sounds bilateral/breath sounds equal/positive end tidal CO2 noted
breath sounds bilateral/breath sounds equal/positive end tidal CO2 noted/CXR pending/chest excursion noted
breath sounds bilateral

## 2025-01-23 NOTE — RAPID RESPONSE TEAM SUMMARY - NSSITUATIONBACKGROUNDRRT_GEN_ALL_CORE
68 yo M w/ PMHx of metastatic testicular cancer (s/p L orchiectomy, on etoposide/cisplatin chemo), epilepsy (grand-mal seizures), schizophrenia, developmental delay, HTN, HLD, VAZQUEZ, stage III CKD, severe obesity, secondary hyperparathyroidism, anemia, thrombocytopenia who initially presented for syncope, course c/b grand mal seizure requiring intubation and MICU transfer for airway protection, now downgraded completed 5 day course of CTX for suspected PNA, s/p Tamiflu for Influenza (completed 5d course 1/17-1/21). Overnight, pt reportedly hypertensive as high as SBP 200s. Had rapid called earlier in the morning for fever, hypertension, and hypoxia with CXR showing increased pulmonary vascular congestion concerning for flash pulmonary edema. Given Bumex IV and started on AVAPS    RRT called again for increased work of breathing. On arrival, /110, HR 140s, RR 50, febrile to 102F. Exam notable for increased bilateral crackles compared to the first rapid. Immediate suspicion were flash pulmonary edema, additional IV bumex 4 given. Given markedly increased work of breathing, decision was made to intubate the patient. Full code status was confirmed with patient at bedside. Patient became hypotensive to lowest 60s/40s post-intubation while on propofol likely iso vasoplegia shock, given zheng-stick and started on Levophed. Transported to MICU for further care.
70 yo M w/ PMHx of metastatic testicular cancer (s/p L orchiectomy, on etoposide/cisplatin chemo), epilepsy (grand-mal seizures), schizophrenia, developmental delay, HTN, HLD, VAZQUEZ, stage III CKD, severe obesity, secondary hyperparathyroidism, anemia, thrombocytopenia who initially presented for syncope, course c/b grand mal seizure requiring intubation and MICU transfer for airway protection, now downgraded completed 5 day course of CTX for suspected PNA, s/p Tamiflu for Influenza (completed 5d course 1/17-1/21). Overnight, pt reportedly hypertensive as high as SBP 200s. RRT called for hypoxia and respiratory distress. Upon arrival pt on 5 L NC, appears uncomfortable w/ WOB. Rectal temp 101. IV tylenol ordered. Full set of labs obtained including ABG and blood cx. Ordered vanc and zosyn, RVP, MRSA swab. CXR w/ increased pulmonary vascular congestion. 2 mg IV bumex ordered and pt placed on AVAPS. Pt appeared much more comfortable, satting 92-96%. Ddx: flash pulm edema 2/2 HTN vs. aspiration event vs. respiratory distress related to fever. Recommend following up sepsis w/u. Consider standing bumex, strict I&O's, BP control. Plan discussed w/ primary team.  
69M w/ PMH of metastatic testicular cancer (s/p L orchiectomy, on etoposide/cisplatin chemo), epilepsy (grand-mal seizures), schizophrenia, developmental delay, HTN, HLD, VAZQUEZ, stage III CKD, severe obesity, secondary hyperparathyroidism, anemia, thrombocytopenia who presented to ED for unwitnessed fall, possible seizure prior. RRT called again for seizure activity, grand mal with b/l nystagmus and upper extremity jerking. Pt had received 1g keppra PO prior to seizure, was due for 6 PM lamictal but unable to receive. Seizure abated, patient post ictal for about a minute before another episode lasting about 1-2 minutes occurred. Patient then had limited response to questioning, which was interpreted to be post-ictal period, no focal deficits. Notably his blood pressure was SBP 200s in the R arm and 140s in the L arm, which persisted on subsequent readings.     Neurology was contacted and recommended holding additional AEDs. Patient was given labetalol 10 mg for hypertensive emergency, IV acetaminophen for rectal temperature 102. Vitals initially improved but patient had significant bronchoconstriction, for which 2 Duonebs treatments were given. Patient developed a jerking motion of the RUE, which family member noted was prodrome for additional seizure, neurology contacted who came to bedside and evaluated patient, recommended obtaining AED levels and CT head. Patient had not received vancomycin as ordered at prior RRT 2/2 family concern about renal function, discussed with family and went ahead with vancomycin administration.     Patient transported to CT head and CT chest without event, but post scan, upon transfer to bed had another episode of grand mal seizure, initially monitored, but developed apnea and ativan 2 mg given with seizure breaking. Patient airway opened with jaw-thrust, patient returned to 4 [place]o and suctioned. Patient noted without gag upon OPA insertion, decision made to intubate and anesthesia called.     Patient started on propofol and levophed. Neurology reassessed and recommended 200 vimpat load. Patient then transferred to MICU
69M w/ hx of metastatic testicular cancer (s/p L orchiectomy, on etoposide/cisplatin chemo), epilepsy (grand-mal seizures), schizophrenia, developmental delay, HTN, HLD, VAZQUEZ, stage III CKD, severe obesity, secondary hyperparathyroidism, anemia, thrombocytopenia who presented to ED for fall, possible seizure prior. He was at MRI, but refused scan 10 minutes in, denied any symptoms at that time, was brought out of MR machine, then became unresponsive with eyes rolling back and forth, concerning for seizure activity. Within 1 minute of rapid team arrival, pt was responding to name.

## 2025-01-23 NOTE — CONSULT NOTE ADULT - ASSESSMENT
70 y/o M with PMH of metastatic testicular cancer (s/p L orchiectomy, on etoposide/cisplatin chemo, last dose 6/2024), epilepsy (grand-mal seizures), schizophrenia, developmental delay, HTN, HLD, VAZQUEZ, stage III CKD, severe obesity, secondary hyperparathyroidism, anemia, thrombocytopenia who presented to ED for fall, possible seizure prior. On 1/17 patient had multiple RRTs called for grand mal seizure activity and  patient had x2 more episode and was ultimately given ativan 2mg, Vimpat load 200mg, and intubated for airway protection with MICU transfer. Extubated 1/19 - to AVAPS, post extubation with increased secretion. Downgraded to medical floor 1/21. Pt still with fevers, s/p RRT 1/23 AM for fever, hypoxia. Pulmonary called to consult for further evaluation.

## 2025-01-23 NOTE — RAPID RESPONSE TEAM SUMMARY - NSMEDICATIONSRRT_GEN_ALL_CORE
Tylenol, vancomycin, ceftriaxone
Levophed  Evin-Stick  Bumex		
2 IV bumex  IV tylenol  Vanc, zosyn
Labetalol 10 mg  Acetaminophen IV   2 Duonebs treatments  Ativan 2 mg  200 mg Vimpat

## 2025-01-23 NOTE — CONSULT NOTE ADULT - SUBJECTIVE AND OBJECTIVE BOX
[facility]  [phone]  NEPHROLOGY      INITIAL CONSULT NOTE  --------------------------------------------------------------------------------  HPI:    70 y/o M with PMH of metastatic testicular cancer (s/p L orchiectomy, on etoposide/cisplatin chemo, last dose 6/2024), epilepsy (grand-mal seizures), schizophrenia, developmental delay, HTN, HLD, VAZQUEZ, stage III CKD, severe obesity, secondary hyperparathyroidism, anemia, thrombocytopenia who presented to ED for fall, possible seizure prior. On 1/17 patient had multiple RRTs called for grand mal seizure activity and  patient had x2 more episode and was ultimately given ativan 2mg, Vimpat load 200mg, and intubated for airway protection with MICU transfer. Extubated 1/19 - to AVAPS, post extubation with increased secretion. Downgraded to medical floor 1/21. Pt still with fevers, s/p RRT 1/23 AM for fever, hypoxia.  AVAPS - O2 sats 98% but tachypneic to 40, tachycardic, pt endorsing SOB. RRT ---> intubated for respiratory failure. also febrile and Hypertensive. CKD renal consult called.     PAST HISTORY  --------------------------------------------------------------------------------  PAST MEDICAL & SURGICAL HISTORY:  Hypertension, unspecified type      Other hyperlipidemia      History of epilepsy      Paranoid schizophrenia      Obstructive sleep apnea      Testicular cancer      H/O Spinal surgery        FAMILY HISTORY:    PAST SOCIAL HISTORY:    ALLERGIES & MEDICATIONS  --------------------------------------------------------------------------------  Allergies    penicillin (Hives)  seasonal allergies (Unknown)    Intolerances    latex (Rash)    Standing Inpatient Medications  albuterol/ipratropium for Nebulization 3 milliLiter(s) Nebulizer every 6 hours  amLODIPine   Tablet 10 milliGRAM(s) Oral daily  atorvastatin 20 milliGRAM(s) Oral at bedtime  bacitracin   Ointment 1 Application(s) Topical two times a day  cefepime   IVPB 2000 milliGRAM(s) IV Intermittent once  cefepime   IVPB 2000 milliGRAM(s) IV Intermittent every 12 hours  cefepime   IVPB      chlorhexidine 0.12% Liquid 15 milliLiter(s) Oral Mucosa every 12 hours  chlorhexidine 2% Cloths 1 Application(s) Topical <User Schedule>  chlorhexidine 2% Cloths 1 Application(s) Topical <User Schedule>  escitalopram 15 milliGRAM(s) Oral daily  gabapentin Solution 300 milliGRAM(s) Oral two times a day  heparin   Injectable 7500 Unit(s) SubCutaneous every 12 hours  influenza  Vaccine (HIGH DOSE) 0.5 milliLiter(s) IntraMuscular once  lacosamide IVPB 100 milliGRAM(s) IV Intermittent every 12 hours  lamoTRIgine 100 milliGRAM(s) Oral two times a day  levETIRAcetam  IVPB 1500 milliGRAM(s) IV Intermittent two times a day  lidocaine   4% Patch 1 Patch Transdermal daily  lurasidone 40 milliGRAM(s) Oral daily  midazolam Injectable 2 milliGRAM(s) IV Push once  propofol Infusion 50 MICROgram(s)/kG/Min IV Continuous <Continuous>  sodium chloride 3%  Inhalation 4 milliLiter(s) Inhalation every 12 hours    PRN Inpatient Medications  fentaNYL    Injectable 25 MICROGram(s) IV Push every 4 hours PRN  nystatin Powder 1 Application(s) Topical three times a day PRN      REVIEW OF SYSTEMS  --------------------------------------------------------------------------------    intubated     VITALS/PHYSICAL EXAM  --------------------------------------------------------------------------------  T(C): 39.3 (01-23-25 @ 12:23), Max: 39.3 (01-23-25 @ 12:23)  HR: 116 (01-23-25 @ 14:45) (90 - 147)  BP: 124/83 (01-23-25 @ 12:23) (102/71 - 223/139)  RR: 18 (01-23-25 @ 12:23) (18 - 19)  SpO2: 93% (01-23-25 @ 14:45) (91% - 100%)  Wt(kg): --        01-22-25 @ 07:01  -  01-23-25 @ 07:00  --------------------------------------------------------  IN: 0 mL / OUT: 1400 mL / NET: -1400 mL      Physical Exam:  	Gen: intubated  obese   	no jvd  	Pulm: decrease bs  no rales or ronchi or wheezing  	CV: tachy  S1S2; no rub  	Abd: hypoactive bs soft distended  	UE: Warm, no cyanosis  no clubbing,  no edema; no asterixis  	LE: Warm, no cyanosis  no clubbing, no edema L thigh medial ecchymosis   	Neuro:  intubated   	Skin: Warm, no decrease skin turgor   	    LABS/STUDIES  --------------------------------------------------------------------------------              12.4   11.18 >-----------<  123      [01-23-25 @ 07:38]              38.4     138  |  104  |  38  ----------------------------<  157      [01-23-25 @ 07:38]  4.8   |  19  |  2.09        Ca     9.7     [01-23-25 @ 07:38]      Mg     2.2     [01-23-25 @ 07:38]      Phos  3.5     [01-23-25 @ 07:38]    TPro  6.8  /  Alb  3.7  /  TBili  0.7  /  DBili  x   /  AST  149  /  ALT  170  /  AlkPhos  194  [01-23-25 @ 07:38]    PT/INR: PT 10.9 , INR 0.96       [01-22-25 @ 07:28]  PTT: 31.1       [01-22-25 @ 07:28]      Creatinine Trend:  SCr 2.09 [01-23 @ 07:38]  SCr 2.08 [01-23 @ 00:39]  SCr 2.23 [01-22 @ 07:26]  SCr 2.53 [01-21 @ 00:43]  SCr 2.49 [01-20 @ 00:50]    Urinalysis (01.23.25 @ 00:34)   Glucose Qualitative, Urine: 500 mg/dL  Blood, Urine: Moderate  pH Urine: 5.5  Color: Yellow  Urine Appearance: Clear  Bilirubin: Negative  Ketone - Urine: Negative mg/dL  Specific Gravity: 1.018  Protein, Urine: 300 mg/dL  Urobilinogen: 0.2 mg/dL  Nitrite: Negative  Leukocyte Esterase Concentration: Negative        HCV 0.05, Nonreact      [01-22-23 @ 07:27]    < from: Xray Chest 1 View- PORTABLE-Urgent (Xray Chest 1 View- PORTABLE-Urgent .) (01.23.25 @ 07:26) >    ACC: [id number] EXAM:  XR CHEST PORTABLE URGENT 1V   ORDERED BY: ELISE PINTO     PROCEDURE DATE:  01/23/2025          INTERPRETATION:  TECHNIQUE: A single AP view of the chest was obtained.   Ordered time:   1/23/2025 7:26 AM    COMPARISON: 1/23/2025    CLINICAL INFORMATION: RRT    FINDINGS:  A right-sided port is seen with its tip in the right atrium.  The heart is not well assessed on an AP film.  There are low lung volumes. There are mild perihilar interstitial   opacities.  There are no pleural effusions.  There is no pneumothorax.    IMPRESSION:  Mild pulmonary vascular congestion.    --- End of Report ---    < end of copied text >

## 2025-01-23 NOTE — PROGRESS NOTE ADULT - SUBJECTIVE AND OBJECTIVE BOX
SUBJECTIVE / OVERNIGHT EVENTS:      Patient seen and examined at bedside. S/p RRT this am for hypoxia. Currently on NIV. Tremors noted. CXR this am w/ mild congestion. Started on IV abx      --------------------------------------------------------------------------------------------  LABS:                        12.4   11.18 )-----------( 123      ( 23 Jan 2025 07:38 )             38.4     01-23    138  |  104  |  38[H]  ----------------------------<  157[H]  4.8   |  19[L]  |  2.09[H]    Ca    9.7      23 Jan 2025 07:38  Phos  3.5     01-23  Mg     2.2     01-23    TPro  6.8  /  Alb  3.7  /  TBili  0.7  /  DBili  x   /  AST  149[H]  /  ALT  170[H]  /  AlkPhos  194[H]  01-23    PT/INR - ( 22 Jan 2025 07:28 )   PT: 10.9 sec;   INR: 0.96 ratio         PTT - ( 22 Jan 2025 07:28 )  PTT:31.1 sec  CAPILLARY BLOOD GLUCOSE      POCT Blood Glucose.: 153 mg/dL (23 Jan 2025 06:49)  POCT Blood Glucose.: 152 mg/dL (23 Jan 2025 02:37)        Urinalysis Basic - ( 23 Jan 2025 07:38 )    Color: x / Appearance: x / SG: x / pH: x  Gluc: 157 mg/dL / Ketone: x  / Bili: x / Urobili: x   Blood: x / Protein: x / Nitrite: x   Leuk Esterase: x / RBC: x / WBC x   Sq Epi: x / Non Sq Epi: x / Bacteria: x        RADIOLOGY & ADDITIONAL TESTS:  < from: Xray Chest 1 View- PORTABLE-Urgent (Xray Chest 1 View- PORTABLE-Urgent .) (01.23.25 @ 07:26) >  Mild pulmonary vascular congestion.    < end of copied text >    Imaging Personally Reviewed:  [x] YES  [ ] NO    Consultant(s) Notes Reviewed:  [x] YES  [ ] NO    MEDICATIONS  (STANDING):  acetylcysteine 10%  Inhalation 4 milliLiter(s) Inhalation every 6 hours  albuterol/ipratropium for Nebulization 3 milliLiter(s) Nebulizer every 6 hours  amLODIPine   Tablet 5 milliGRAM(s) Oral daily  atorvastatin 20 milliGRAM(s) Oral at bedtime  bacitracin   Ointment 1 Application(s) Topical two times a day  cefepime   IVPB 2000 milliGRAM(s) IV Intermittent once  cefepime   IVPB 2000 milliGRAM(s) IV Intermittent every 12 hours  cefepime   IVPB      chlorhexidine 2% Cloths 1 Application(s) Topical <User Schedule>  escitalopram 15 milliGRAM(s) Oral daily  gabapentin Solution 300 milliGRAM(s) Oral two times a day  heparin   Injectable 7500 Unit(s) SubCutaneous every 12 hours  influenza  Vaccine (HIGH DOSE) 0.5 milliLiter(s) IntraMuscular once  lacosamide IVPB 100 milliGRAM(s) IV Intermittent every 12 hours  lamoTRIgine 100 milliGRAM(s) Oral two times a day  levETIRAcetam  IVPB 1500 milliGRAM(s) IV Intermittent two times a day  lidocaine   4% Patch 1 Patch Transdermal daily  lisinopril 5 milliGRAM(s) Oral daily  lurasidone 40 milliGRAM(s) Oral daily  metoprolol tartrate Injectable 5 milliGRAM(s) IV Push once    MEDICATIONS  (PRN):  acetaminophen     Tablet .. 650 milliGRAM(s) Oral every 6 hours PRN Temp greater or equal to 38C (100.4F), Mild Pain (1 - 3), Moderate Pain (4 - 6)  acetaminophen   IVPB .. 1000 milliGRAM(s) IV Intermittent once PRN Temp greater or equal to 38C (100.4F), Mild Pain (1 - 3)  nystatin Powder 1 Application(s) Topical three times a day PRN fungal infection you may see and want to treat      Care Discussed with Consultants/Other Providers [x] YES  [ ] NO    Vital Signs Last 24 Hrs  T(C): 37.3 (23 Jan 2025 05:22), Max: 38.1 (22 Jan 2025 23:39)  T(F): 99.1 (23 Jan 2025 05:22), Max: 100.6 (22 Jan 2025 23:39)  HR: 90 (23 Jan 2025 07:20) (90 - 125)  BP: 128/89 (23 Jan 2025 05:22) (128/89 - 223/139)  BP(mean): --  RR: 19 (23 Jan 2025 05:22) (18 - 19)  SpO2: 97% (23 Jan 2025 07:20) (91% - 100%)    Parameters below as of 23 Jan 2025 05:22  Patient On (Oxygen Delivery Method): nasal cannula  O2 Flow (L/min): 3    I&O's Summary    22 Jan 2025 07:01  -  23 Jan 2025 07:00  --------------------------------------------------------  IN: 0 mL / OUT: 1400 mL / NET: -1400 mL        PHYSICAL EXAM:  GENERAL: +Tremors, on NIV  HEAD:  + abrasion   EYES: EOMI, PERRLA, conjunctiva and sclera clear  NECK: Supple, No JVD  CHEST/LUNG: Diminished bilaterally; No wheeze  HEART: Regular rate and rhythm; No murmurs, rubs, or gallops  ABDOMEN: Soft, Nontender, +distended; Bowel sounds present  NEURO: AAOx3, no focal weakness, 5/5 b/l extremity strength, b/l knee no arthritis, no effusion   EXTREMITIES:  2+ Peripheral Pulses, No clubbing, cyanosis, or edema  SKIN: No rashes or lesions

## 2025-01-23 NOTE — CONSULT NOTE ADULT - SUBJECTIVE AND OBJECTIVE BOX
Pulmonary Consult   01-23-25 @ 12:42    Patient is a 69y old  Male who presents with a chief complaint of seizure, flu, elevated trop (23 Jan 2025 10:38)      HPI: 68 y/o M with PMH of metastatic testicular cancer (s/p L orchiectomy, on etoposide/cisplatin chemo, last dose 6/2024), epilepsy (grand-mal seizures), schizophrenia, developmental delay, HTN, HLD, VAZQUEZ, stage III CKD, severe obesity, secondary hyperparathyroidism, anemia, thrombocytopenia who presented to ED for fall, possible seizure prior. On 1/17 patient had multiple RRTs called for grand mal seizure activity and  patient had x2 more episode and was ultimately given ativan 2mg, Vimpat load 200mg, and intubated for airway protection with MICU transfer. Extubated 1/19 - to AVAPS, post extubation with increased secretion. Downgraded to medical floor 1/21. Pt still with fevers, s/p RRT 1/23 AM for fever, hypoxia. Pulmonary called to consult for further evaluation.         ?FOLLOWING PRESENT  [ x ] Hx of PE/DVT, [x ] Hx COPD, [ x] Hx of Asthma, [y ] Hx of Hospitalization, [ y]  Hx of BiPAP/CPAP use, [ y] Hx of VAZQUEZ    Allergies    penicillin (Hives)  seasonal allergies (Unknown)    Intolerances    latex (Rash)      PAST MEDICAL & SURGICAL HISTORY:  Hypertension, unspecified type    Other hyperlipidemia      History of epilepsy      Paranoid schizophrenia      Obstructive sleep apnea      Testicular cancer      H/O Spinal surgery      FAMILY HISTORY:      Social History: [  ] TOBACCO                  [  ] ETOH                                 [  ] IVDA/DRUGS    REVIEW OF SYSTEMS      General:	    Skin/Breast:  	  Ophthalmologic:  	  ENMT:	    Respiratory and Thorax:  	  Cardiovascular:	    Gastrointestinal:	    Genitourinary:	    Musculoskeletal:	    Neurological:	    Psychiatric:	    Hematology/Lymphatics:	    Endocrine:	    Allergic/Immunologic:	    MEDICATIONS  (STANDING):  acetaminophen   IVPB .. 1000 milliGRAM(s) IV Intermittent once  acetylcysteine 10%  Inhalation 4 milliLiter(s) Inhalation every 6 hours  albuterol/ipratropium for Nebulization 3 milliLiter(s) Nebulizer every 6 hours  amLODIPine   Tablet 10 milliGRAM(s) Oral daily  atorvastatin 20 milliGRAM(s) Oral at bedtime  bacitracin   Ointment 1 Application(s) Topical two times a day  cefepime   IVPB 2000 milliGRAM(s) IV Intermittent once  cefepime   IVPB 2000 milliGRAM(s) IV Intermittent every 12 hours  cefepime   IVPB      chlorhexidine 2% Cloths 1 Application(s) Topical <User Schedule>  escitalopram 15 milliGRAM(s) Oral daily  gabapentin Solution 300 milliGRAM(s) Oral two times a day  heparin   Injectable 7500 Unit(s) SubCutaneous every 12 hours  influenza  Vaccine (HIGH DOSE) 0.5 milliLiter(s) IntraMuscular once  lacosamide IVPB 100 milliGRAM(s) IV Intermittent every 12 hours  lamoTRIgine 100 milliGRAM(s) Oral two times a day  levETIRAcetam  IVPB 1500 milliGRAM(s) IV Intermittent two times a day  lidocaine   4% Patch 1 Patch Transdermal daily  lurasidone 40 milliGRAM(s) Oral daily    MEDICATIONS  (PRN):  acetaminophen   IVPB .. 1000 milliGRAM(s) IV Intermittent once PRN Temp greater or equal to 38C (100.4F), Mild Pain (1 - 3)  acetaminophen  Suppository .. 650 milliGRAM(s) Rectal every 6 hours PRN Temp greater or equal to 38C (100.4F), Moderate Pain (4 - 6)  nystatin Powder 1 Application(s) Topical three times a day PRN fungal infection you may see and want to treat       Vital Signs Last 24 Hrs  T(C): 39.3 (23 Jan 2025 12:23), Max: 39.3 (23 Jan 2025 12:23)  T(F): 102.7 (23 Jan 2025 12:23), Max: 102.7 (23 Jan 2025 12:23)  HR: 120 (23 Jan 2025 12:23) (90 - 147)  BP: 124/83 (23 Jan 2025 12:23) (102/71 - 223/139)  BP(mean): --  RR: 18 (23 Jan 2025 12:23) (18 - 19)  SpO2: 95% (23 Jan 2025 12:23) (91% - 100%)    Parameters below as of 23 Jan 2025 12:23  Patient On (Oxygen Delivery Method): BiPAP/CPAP    Orthostatic VS          I&O's Summary    22 Jan 2025 07:01  -  23 Jan 2025 07:00  --------------------------------------------------------  IN: 0 mL / OUT: 1400 mL / NET: -1400 mL        Physical Exam:   GENERAL: NAD, well-groomed, well-developed  HEENT: BREE/   Atraumatic, Normocephalic  ENMT: No tonsillar erythema, exudates, or enlargement; Moist mucous membranes, Good dentition, No lesions  NECK: Supple, No JVD, Normal thyroid  CHEST/LUNG: Clear to auscultation bilaterally; No rales, rhonchi, wheezing, or rubs  CVS: Regular rate and rhythm; No murmurs, rubs, or gallops  GI: : Soft, Nontender, Nondistended; Bowel sounds present  NERVOUS SYSTEM:  Alert & Oriented X3, Good concentration; Motor Strength 5/5 B/L upper and lower extremities; DTRs 2+ intact and symmetric  EXTREMITIES:  2+ Peripheral Pulses, No clubbing, cyanosis, or edema  LYMPH: No lymphadenopathy noted  SKIN: No rashes or lesions  ENDOCRINOLOGY: No Thyromegaly  PSYCH: Appropriate    Labs:  ABG - ( 23 Jan 2025 06:57 )  pH, Arterial: 7.35  pH, Blood: x     /  pCO2: 37    /  pO2: 58    / HCO3: 20    / Base Excess: -4.6  /  SaO2: 89.0                            12.4   11.18 )-----------( 123      ( 23 Jan 2025 07:38 )             38.4                         10.3   6.42  )-----------( 106      ( 23 Jan 2025 00:39 )             33.1                         10.4   4.89  )-----------( 114      ( 22 Jan 2025 07:28 )             33.4                         10.6   6.58  )-----------( 103      ( 21 Jan 2025 00:43 )             33.1                         10.9   10.49 )-----------( 104      ( 20 Jan 2025 00:49 )             33.0     01-23    138  |  104  |  38[H]  ----------------------------<  157[H]  4.8   |  19[L]  |  2.09[H]  01-23    139  |  105  |  38[H]  ----------------------------<  114[H]  4.3   |  20[L]  |  2.08[H]  01-22    142  |  109[H]  |  38[H]  ----------------------------<  112[H]  4.4   |  20[L]  |  2.23[H]  01-21    140  |  106  |  40[H]  ----------------------------<  123[H]  4.0   |  19[L]  |  2.53[H]  01-20    140  |  106  |  39[H]  ----------------------------<  113[H]  4.3   |  15[L]  |  2.49[H]  01-19    137  |  106  |  38[H]  ----------------------------<  126[H]  4.6   |  15[L]  |  2.57[H]    Ca    9.7      23 Jan 2025 07:38  Ca    8.9      23 Jan 2025 00:39  Ca    9.2      22 Jan 2025 07:26  Phos  3.5     01-23  Phos  3.0     01-22  Mg     2.2     01-23  Mg     2.3     01-22    TPro  6.8  /  Alb  3.7  /  TBili  0.7  /  DBili  x   /  AST  149[H]  /  ALT  170[H]  /  AlkPhos  194[H]  01-23  TPro  6.1  /  Alb  3.5  /  TBili  0.4  /  DBili  x   /  AST  139[H]  /  ALT  142[H]  /  AlkPhos  172[H]  01-23  TPro  6.1  /  Alb  3.4  /  TBili  0.4  /  DBili  x   /  AST  96[H]  /  ALT  97[H]  /  AlkPhos  139[H]  01-22  TPro  5.9[L]  /  Alb  3.1[L]  /  TBili  0.2  /  DBili  x   /  AST  58[H]  /  ALT  51[H]  /  AlkPhos  103  01-21  TPro  6.6  /  Alb  3.4  /  TBili  0.2  /  DBili  x   /  AST  63[H]  /  ALT  54[H]  /  AlkPhos  122[H]  01-20  TPro  6.2  /  Alb  3.5  /  TBili  0.3  /  DBili  x   /  AST  63[H]  /  ALT  52[H]  /  AlkPhos  119  01-19    CAPILLARY BLOOD GLUCOSE      POCT Blood Glucose.: 153 mg/dL (23 Jan 2025 06:49)  POCT Blood Glucose.: 152 mg/dL (23 Jan 2025 02:37)    LIVER FUNCTIONS - ( 23 Jan 2025 07:38 )  Alb: 3.7 g/dL / Pro: 6.8 g/dL / ALK PHOS: 194 U/L / ALT: 170 U/L / AST: 149 U/L / GGT: x           PT/INR - ( 22 Jan 2025 07:28 )   PT: 10.9 sec;   INR: 0.96 ratio         PTT - ( 22 Jan 2025 07:28 )  PTT:31.1 sec  Urinalysis Basic - ( 23 Jan 2025 07:38 )    Color: x / Appearance: x / SG: x / pH: x  Gluc: 157 mg/dL / Ketone: x  / Bili: x / Urobili: x   Blood: x / Protein: x / Nitrite: x   Leuk Esterase: x / RBC: x / WBC x   Sq Epi: x / Non Sq Epi: x / Bacteria: x      D DImerLactate, Blood: 0.9 mmol/L (01-23 @ 00:39)        Studies  < from: Xray Chest 1 View- PORTABLE-Urgent (Xray Chest 1 View- PORTABLE-Urgent .) (01.23.25 @ 07:26) >      ACC: [id number] EXAM:  XR CHEST PORTABLE URGENT 1V   ORDERED BY: ELISE PINTO     PROCEDURE DATE:  01/23/2025    ******PRELIMINARY REPORT******      ******PRELIMINARY REPORT******          INTERPRETATION:  Prelim  impression:  Mild pulmonary vascular congestion.        ******PRELIMINARY REPORT******      ******PRELIMINARY REPORT******       < end of copied text >    < from: CT Chest No Cont (01.17.25 @ 20:09) >    FINDINGS:    AIRWAYS/LUNGS/PLEURA: Marked anterior bowing of the posterior tracheal   membrane, suggestive of tracheomalacia. Respiratory motion artifact   limits evaluation of the lung parenchyma. Unchanged 4 mm right   perifissural nodule (3:52). Scattered areas of subsegmental atelectasis   including at the lung apices. Trace right pleural effusion.    MEDIASTINUM AND HA: No lymphadenopathy.    VESSELS: Aortic calcifications. Coronary artery calcifications.    HEART: Cardiomegaly. No pericardial effusion.    VISUALIZED UPPER ABDOMEN: Bilateral renal cysts.    CHEST WALL AND BONES: Partially imaged thoracolumbar posterior fusion.   Stable widening of the posterior T10-T11 disc space with abrupt kyphotic   angulation. Right chest wall port with the tip in the right atrium.    IMPRESSION:    Anterior bowing of the posterior tracheal wall, suggestive of   tracheomalacia.    Trace right pleural effusion.    --- End of Report ---      < end of copied text >                   Pulmonary Consult   01-23-25 @ 12:42    Patient is a 69y old  Male who presents with a chief complaint of seizure, flu, elevated trop (23 Jan 2025 10:38)      HPI: 70 y/o M with PMH of metastatic testicular cancer (s/p L orchiectomy, on etoposide/cisplatin chemo, last dose 6/2024), epilepsy (grand-mal seizures), schizophrenia, developmental delay, HTN, HLD, VAZQUEZ, stage III CKD, severe obesity, secondary hyperparathyroidism, anemia, thrombocytopenia who presented to ED for fall, possible seizure prior. On 1/17 patient had multiple RRTs called for grand mal seizure activity and  patient had x2 more episode and was ultimately given ativan 2mg, Vimpat load 200mg, and intubated for airway protection with MICU transfer. Extubated 1/19 - to AVAPS, post extubation with increased secretion. Downgraded to medical floor 1/21. Pt still with fevers, s/p RRT 1/23 AM for fever, hypoxia. Pulmonary called to consult for further evaluation. Pt seen on AVAPS - O2 sats 98% but tachypneic to 40, tachycardic, pt endorsing SOB.         ?FOLLOWING PRESENT  [ x ] Hx of PE/DVT, [x ] Hx COPD, [ x] Hx of Asthma, [y ] Hx of Hospitalization, [ y]  Hx of BiPAP/CPAP use, [ y] Hx of VAZQUEZ    Allergies    penicillin (Hives)  seasonal allergies (Unknown)    Intolerances    latex (Rash)      PAST MEDICAL & SURGICAL HISTORY:  Hypertension, unspecified type    Other hyperlipidemia      History of epilepsy      Paranoid schizophrenia      Obstructive sleep apnea      Testicular cancer      H/O Spinal surgery      FAMILY HISTORY: non contributory       Social History: [ former smoker ] TOBACCO                  [x  ] ETOH                                 [  x] IVDA/DRUGS    REVIEW OF SYSTEMS: limited as above due to AVAPS use     MEDICATIONS  (STANDING):  acetaminophen   IVPB .. 1000 milliGRAM(s) IV Intermittent once  acetylcysteine 10%  Inhalation 4 milliLiter(s) Inhalation every 6 hours  albuterol/ipratropium for Nebulization 3 milliLiter(s) Nebulizer every 6 hours  amLODIPine   Tablet 10 milliGRAM(s) Oral daily  atorvastatin 20 milliGRAM(s) Oral at bedtime  bacitracin   Ointment 1 Application(s) Topical two times a day  cefepime   IVPB 2000 milliGRAM(s) IV Intermittent once  cefepime   IVPB 2000 milliGRAM(s) IV Intermittent every 12 hours  cefepime   IVPB      chlorhexidine 2% Cloths 1 Application(s) Topical <User Schedule>  escitalopram 15 milliGRAM(s) Oral daily  gabapentin Solution 300 milliGRAM(s) Oral two times a day  heparin   Injectable 7500 Unit(s) SubCutaneous every 12 hours  influenza  Vaccine (HIGH DOSE) 0.5 milliLiter(s) IntraMuscular once  lacosamide IVPB 100 milliGRAM(s) IV Intermittent every 12 hours  lamoTRIgine 100 milliGRAM(s) Oral two times a day  levETIRAcetam  IVPB 1500 milliGRAM(s) IV Intermittent two times a day  lidocaine   4% Patch 1 Patch Transdermal daily  lurasidone 40 milliGRAM(s) Oral daily    MEDICATIONS  (PRN):  acetaminophen   IVPB .. 1000 milliGRAM(s) IV Intermittent once PRN Temp greater or equal to 38C (100.4F), Mild Pain (1 - 3)  acetaminophen  Suppository .. 650 milliGRAM(s) Rectal every 6 hours PRN Temp greater or equal to 38C (100.4F), Moderate Pain (4 - 6)  nystatin Powder 1 Application(s) Topical three times a day PRN fungal infection you may see and want to treat       Vital Signs Last 24 Hrs  T(C): 39.3 (23 Jan 2025 12:23), Max: 39.3 (23 Jan 2025 12:23)  T(F): 102.7 (23 Jan 2025 12:23), Max: 102.7 (23 Jan 2025 12:23)  HR: 120 (23 Jan 2025 12:23) (90 - 147)  BP: 124/83 (23 Jan 2025 12:23) (102/71 - 223/139)  BP(mean): --  RR: 18 (23 Jan 2025 12:23) (18 - 19)  SpO2: 95% (23 Jan 2025 12:23) (91% - 100%)    Parameters below as of 23 Jan 2025 12:23  Patient On (Oxygen Delivery Method): BiPAP/CPAP    Orthostatic VS          I&O's Summary    22 Jan 2025 07:01  -  23 Jan 2025 07:00  --------------------------------------------------------  IN: 0 mL / OUT: 1400 mL / NET: -1400 mL      Physical Exam:   GENERAL: tachypneic appears labored   HEENT: BREE  ENMT: No tonsillar erythema, exudates, or enlargement  NECK: Supple, No JVD  CHEST/LUNG: Decreased BS   CVS: Regular rate and rhythm  GI: Soft, Nontender, Nondistended  NERVOUS SYSTEM:  Alert & Oriented X  EXTREMITIES:  2+ Peripheral Pulses, No clubbing, cyanosis, or edema  SKIN: No rashes or lesions  PSYCH: Appropriate    Labs:  ABG - ( 23 Jan 2025 06:57 )  pH, Arterial: 7.35  pH, Blood: x     /  pCO2: 37    /  pO2: 58    / HCO3: 20    / Base Excess: -4.6  /  SaO2: 89.0                            12.4   11.18 )-----------( 123      ( 23 Jan 2025 07:38 )             38.4                         10.3   6.42  )-----------( 106      ( 23 Jan 2025 00:39 )             33.1                         10.4   4.89  )-----------( 114      ( 22 Jan 2025 07:28 )             33.4                         10.6   6.58  )-----------( 103      ( 21 Jan 2025 00:43 )             33.1                         10.9   10.49 )-----------( 104      ( 20 Jan 2025 00:49 )             33.0     01-23    138  |  104  |  38[H]  ----------------------------<  157[H]  4.8   |  19[L]  |  2.09[H]  01-23    139  |  105  |  38[H]  ----------------------------<  114[H]  4.3   |  20[L]  |  2.08[H]  01-22    142  |  109[H]  |  38[H]  ----------------------------<  112[H]  4.4   |  20[L]  |  2.23[H]  01-21    140  |  106  |  40[H]  ----------------------------<  123[H]  4.0   |  19[L]  |  2.53[H]  01-20    140  |  106  |  39[H]  ----------------------------<  113[H]  4.3   |  15[L]  |  2.49[H]  01-19    137  |  106  |  38[H]  ----------------------------<  126[H]  4.6   |  15[L]  |  2.57[H]    Ca    9.7      23 Jan 2025 07:38  Ca    8.9      23 Jan 2025 00:39  Ca    9.2      22 Jan 2025 07:26  Phos  3.5     01-23  Phos  3.0     01-22  Mg     2.2     01-23  Mg     2.3     01-22    TPro  6.8  /  Alb  3.7  /  TBili  0.7  /  DBili  x   /  AST  149[H]  /  ALT  170[H]  /  AlkPhos  194[H]  01-23  TPro  6.1  /  Alb  3.5  /  TBili  0.4  /  DBili  x   /  AST  139[H]  /  ALT  142[H]  /  AlkPhos  172[H]  01-23  TPro  6.1  /  Alb  3.4  /  TBili  0.4  /  DBili  x   /  AST  96[H]  /  ALT  97[H]  /  AlkPhos  139[H]  01-22  TPro  5.9[L]  /  Alb  3.1[L]  /  TBili  0.2  /  DBili  x   /  AST  58[H]  /  ALT  51[H]  /  AlkPhos  103  01-21  TPro  6.6  /  Alb  3.4  /  TBili  0.2  /  DBili  x   /  AST  63[H]  /  ALT  54[H]  /  AlkPhos  122[H]  01-20  TPro  6.2  /  Alb  3.5  /  TBili  0.3  /  DBili  x   /  AST  63[H]  /  ALT  52[H]  /  AlkPhos  119  01-19    CAPILLARY BLOOD GLUCOSE      POCT Blood Glucose.: 153 mg/dL (23 Jan 2025 06:49)  POCT Blood Glucose.: 152 mg/dL (23 Jan 2025 02:37)    LIVER FUNCTIONS - ( 23 Jan 2025 07:38 )  Alb: 3.7 g/dL / Pro: 6.8 g/dL / ALK PHOS: 194 U/L / ALT: 170 U/L / AST: 149 U/L / GGT: x           PT/INR - ( 22 Jan 2025 07:28 )   PT: 10.9 sec;   INR: 0.96 ratio         PTT - ( 22 Jan 2025 07:28 )  PTT:31.1 sec  Urinalysis Basic - ( 23 Jan 2025 07:38 )    Color: x / Appearance: x / SG: x / pH: x  Gluc: 157 mg/dL / Ketone: x  / Bili: x / Urobili: x   Blood: x / Protein: x / Nitrite: x   Leuk Esterase: x / RBC: x / WBC x   Sq Epi: x / Non Sq Epi: x / Bacteria: x      D DImerLactate, Blood: 0.9 mmol/L (01-23 @ 00:39)        Studies  < from: Xray Chest 1 View- PORTABLE-Urgent (Xray Chest 1 View- PORTABLE-Urgent .) (01.23.25 @ 07:26) >      ACC: [id number] EXAM:  XR CHEST PORTABLE URGENT 1V   ORDERED BY: ELISE PINTO     PROCEDURE DATE:  01/23/2025    ******PRELIMINARY REPORT******      ******PRELIMINARY REPORT******          INTERPRETATION:  Prelim  impression:  Mild pulmonary vascular congestion.        ******PRELIMINARY REPORT******      ******PRELIMINARY REPORT******       < end of copied text >    < from: CT Chest No Cont (01.17.25 @ 20:09) >    FINDINGS:    AIRWAYS/LUNGS/PLEURA: Marked anterior bowing of the posterior tracheal   membrane, suggestive of tracheomalacia. Respiratory motion artifact   limits evaluation of the lung parenchyma. Unchanged 4 mm right   perifissural nodule (3:52). Scattered areas of subsegmental atelectasis   including at the lung apices. Trace right pleural effusion.    MEDIASTINUM AND HA: No lymphadenopathy.    VESSELS: Aortic calcifications. Coronary artery calcifications.    HEART: Cardiomegaly. No pericardial effusion.    VISUALIZED UPPER ABDOMEN: Bilateral renal cysts.    CHEST WALL AND BONES: Partially imaged thoracolumbar posterior fusion.   Stable widening of the posterior T10-T11 disc space with abrupt kyphotic   angulation. Right chest wall port with the tip in the right atrium.    IMPRESSION:    Anterior bowing of the posterior tracheal wall, suggestive of   tracheomalacia.    Trace right pleural effusion.    --- End of Report ---      < end of copied text >                   Pulmonary Consult   01-23-25 @ 12:42    Patient is a 69y old  Male who presents with a chief complaint of seizure, flu, elevated trop (23 Jan 2025 10:38)      HPI: 68 y/o M with PMH of metastatic testicular cancer (s/p L orchiectomy, on etoposide/cisplatin chemo, last dose 6/2024), epilepsy (grand-mal seizures), schizophrenia, developmental delay, HTN, HLD, VZAQUEZ, stage III CKD, severe obesity, secondary hyperparathyroidism, anemia, thrombocytopenia who presented to ED for fall, possible seizure prior. On 1/17 patient had multiple RRTs called for grand mal seizure activity and  patient had x2 more episode and was ultimately given ativan 2mg, Vimpat load 200mg, and intubated for airway protection with MICU transfer. Extubated 1/19 - to AVAPS, post extubation with increased secretion. Downgraded to medical floor 1/21. Pt still with fevers, s/p RRT 1/23 AM for fever, hypoxia. Pulmonary called to consult for further evaluation. Pt seen on AVAPS - O2 sats 98% but tachypneic to 40, tachycardic, pt endorsing SOB. RRT called.         ?FOLLOWING PRESENT  [ x ] Hx of PE/DVT, [x ] Hx COPD, [ x] Hx of Asthma, [y ] Hx of Hospitalization, [ y]  Hx of BiPAP/CPAP use, [ y] Hx of VAZQUEZ    Allergies    penicillin (Hives)  seasonal allergies (Unknown)    Intolerances    latex (Rash)      PAST MEDICAL & SURGICAL HISTORY:  Hypertension, unspecified type    Other hyperlipidemia      History of epilepsy      Paranoid schizophrenia      Obstructive sleep apnea      Testicular cancer      H/O Spinal surgery      FAMILY HISTORY: non contributory       Social History: [ former smoker ] TOBACCO                  [x  ] ETOH                                 [  x] IVDA/DRUGS    REVIEW OF SYSTEMS: limited as above due to AVAPS use     MEDICATIONS  (STANDING):  acetaminophen   IVPB .. 1000 milliGRAM(s) IV Intermittent once  acetylcysteine 10%  Inhalation 4 milliLiter(s) Inhalation every 6 hours  albuterol/ipratropium for Nebulization 3 milliLiter(s) Nebulizer every 6 hours  amLODIPine   Tablet 10 milliGRAM(s) Oral daily  atorvastatin 20 milliGRAM(s) Oral at bedtime  bacitracin   Ointment 1 Application(s) Topical two times a day  cefepime   IVPB 2000 milliGRAM(s) IV Intermittent once  cefepime   IVPB 2000 milliGRAM(s) IV Intermittent every 12 hours  cefepime   IVPB      chlorhexidine 2% Cloths 1 Application(s) Topical <User Schedule>  escitalopram 15 milliGRAM(s) Oral daily  gabapentin Solution 300 milliGRAM(s) Oral two times a day  heparin   Injectable 7500 Unit(s) SubCutaneous every 12 hours  influenza  Vaccine (HIGH DOSE) 0.5 milliLiter(s) IntraMuscular once  lacosamide IVPB 100 milliGRAM(s) IV Intermittent every 12 hours  lamoTRIgine 100 milliGRAM(s) Oral two times a day  levETIRAcetam  IVPB 1500 milliGRAM(s) IV Intermittent two times a day  lidocaine   4% Patch 1 Patch Transdermal daily  lurasidone 40 milliGRAM(s) Oral daily    MEDICATIONS  (PRN):  acetaminophen   IVPB .. 1000 milliGRAM(s) IV Intermittent once PRN Temp greater or equal to 38C (100.4F), Mild Pain (1 - 3)  acetaminophen  Suppository .. 650 milliGRAM(s) Rectal every 6 hours PRN Temp greater or equal to 38C (100.4F), Moderate Pain (4 - 6)  nystatin Powder 1 Application(s) Topical three times a day PRN fungal infection you may see and want to treat       Vital Signs Last 24 Hrs  T(C): 39.3 (23 Jan 2025 12:23), Max: 39.3 (23 Jan 2025 12:23)  T(F): 102.7 (23 Jan 2025 12:23), Max: 102.7 (23 Jan 2025 12:23)  HR: 120 (23 Jan 2025 12:23) (90 - 147)  BP: 124/83 (23 Jan 2025 12:23) (102/71 - 223/139)  BP(mean): --  RR: 18 (23 Jan 2025 12:23) (18 - 19)  SpO2: 95% (23 Jan 2025 12:23) (91% - 100%)    Parameters below as of 23 Jan 2025 12:23  Patient On (Oxygen Delivery Method): BiPAP/CPAP    Orthostatic VS          I&O's Summary    22 Jan 2025 07:01  -  23 Jan 2025 07:00  --------------------------------------------------------  IN: 0 mL / OUT: 1400 mL / NET: -1400 mL      Physical Exam:   GENERAL: tachypneic appears labored   HEENT: BREE  ENMT: No tonsillar erythema, exudates, or enlargement  NECK: Supple, No JVD  CHEST/LUNG: Decreased BS   CVS: Regular rate and rhythm  GI: Soft, Nontender, Nondistended  NERVOUS SYSTEM:  Alert & Oriented X  EXTREMITIES:  2+ Peripheral Pulses, No clubbing, cyanosis, or edema  SKIN: No rashes or lesions  PSYCH: Appropriate    Labs:  ABG - ( 23 Jan 2025 06:57 )  pH, Arterial: 7.35  pH, Blood: x     /  pCO2: 37    /  pO2: 58    / HCO3: 20    / Base Excess: -4.6  /  SaO2: 89.0                            12.4   11.18 )-----------( 123      ( 23 Jan 2025 07:38 )             38.4                         10.3   6.42  )-----------( 106      ( 23 Jan 2025 00:39 )             33.1                         10.4   4.89  )-----------( 114      ( 22 Jan 2025 07:28 )             33.4                         10.6   6.58  )-----------( 103      ( 21 Jan 2025 00:43 )             33.1                         10.9   10.49 )-----------( 104      ( 20 Jan 2025 00:49 )             33.0     01-23    138  |  104  |  38[H]  ----------------------------<  157[H]  4.8   |  19[L]  |  2.09[H]  01-23    139  |  105  |  38[H]  ----------------------------<  114[H]  4.3   |  20[L]  |  2.08[H]  01-22    142  |  109[H]  |  38[H]  ----------------------------<  112[H]  4.4   |  20[L]  |  2.23[H]  01-21    140  |  106  |  40[H]  ----------------------------<  123[H]  4.0   |  19[L]  |  2.53[H]  01-20    140  |  106  |  39[H]  ----------------------------<  113[H]  4.3   |  15[L]  |  2.49[H]  01-19    137  |  106  |  38[H]  ----------------------------<  126[H]  4.6   |  15[L]  |  2.57[H]    Ca    9.7      23 Jan 2025 07:38  Ca    8.9      23 Jan 2025 00:39  Ca    9.2      22 Jan 2025 07:26  Phos  3.5     01-23  Phos  3.0     01-22  Mg     2.2     01-23  Mg     2.3     01-22    TPro  6.8  /  Alb  3.7  /  TBili  0.7  /  DBili  x   /  AST  149[H]  /  ALT  170[H]  /  AlkPhos  194[H]  01-23  TPro  6.1  /  Alb  3.5  /  TBili  0.4  /  DBili  x   /  AST  139[H]  /  ALT  142[H]  /  AlkPhos  172[H]  01-23  TPro  6.1  /  Alb  3.4  /  TBili  0.4  /  DBili  x   /  AST  96[H]  /  ALT  97[H]  /  AlkPhos  139[H]  01-22  TPro  5.9[L]  /  Alb  3.1[L]  /  TBili  0.2  /  DBili  x   /  AST  58[H]  /  ALT  51[H]  /  AlkPhos  103  01-21  TPro  6.6  /  Alb  3.4  /  TBili  0.2  /  DBili  x   /  AST  63[H]  /  ALT  54[H]  /  AlkPhos  122[H]  01-20  TPro  6.2  /  Alb  3.5  /  TBili  0.3  /  DBili  x   /  AST  63[H]  /  ALT  52[H]  /  AlkPhos  119  01-19    CAPILLARY BLOOD GLUCOSE      POCT Blood Glucose.: 153 mg/dL (23 Jan 2025 06:49)  POCT Blood Glucose.: 152 mg/dL (23 Jan 2025 02:37)    LIVER FUNCTIONS - ( 23 Jan 2025 07:38 )  Alb: 3.7 g/dL / Pro: 6.8 g/dL / ALK PHOS: 194 U/L / ALT: 170 U/L / AST: 149 U/L / GGT: x           PT/INR - ( 22 Jan 2025 07:28 )   PT: 10.9 sec;   INR: 0.96 ratio         PTT - ( 22 Jan 2025 07:28 )  PTT:31.1 sec  Urinalysis Basic - ( 23 Jan 2025 07:38 )    Color: x / Appearance: x / SG: x / pH: x  Gluc: 157 mg/dL / Ketone: x  / Bili: x / Urobili: x   Blood: x / Protein: x / Nitrite: x   Leuk Esterase: x / RBC: x / WBC x   Sq Epi: x / Non Sq Epi: x / Bacteria: x      D DImerLactate, Blood: 0.9 mmol/L (01-23 @ 00:39)        Studies  < from: Xray Chest 1 View- PORTABLE-Urgent (Xray Chest 1 View- PORTABLE-Urgent .) (01.23.25 @ 07:26) >      ACC: [id number] EXAM:  XR CHEST PORTABLE URGENT 1V   ORDERED BY: ELISE ZELAYA DATE:  01/23/2025    ******PRELIMINARY REPORT******      ******PRELIMINARY REPORT******          INTERPRETATION:  Prelim  impression:  Mild pulmonary vascular congestion.        ******PRELIMINARY REPORT******      ******PRELIMINARY REPORT******       < end of copied text >    < from: CT Chest No Cont (01.17.25 @ 20:09) >    FINDINGS:    AIRWAYS/LUNGS/PLEURA: Marked anterior bowing of the posterior tracheal   membrane, suggestive of tracheomalacia. Respiratory motion artifact   limits evaluation of the lung parenchyma. Unchanged 4 mm right   perifissural nodule (3:52). Scattered areas of subsegmental atelectasis   including at the lung apices. Trace right pleural effusion.    MEDIASTINUM AND HA: No lymphadenopathy.    VESSELS: Aortic calcifications. Coronary artery calcifications.    HEART: Cardiomegaly. No pericardial effusion.    VISUALIZED UPPER ABDOMEN: Bilateral renal cysts.    CHEST WALL AND BONES: Partially imaged thoracolumbar posterior fusion.   Stable widening of the posterior T10-T11 disc space with abrupt kyphotic   angulation. Right chest wall port with the tip in the right atrium.    IMPRESSION:    Anterior bowing of the posterior tracheal wall, suggestive of   tracheomalacia.    Trace right pleural effusion.    --- End of Report ---      < end of copied text >

## 2025-01-23 NOTE — CHART NOTE - NSCHARTNOTEFT_GEN_A_CORE
: Dariel Mendes    INDICATION: Hypoxemic Respiratory Failure, Hypertensive Urgency, Flash Pulmonary Edema    PROCEDURE:  [ x] LIMITED ECHO  [ x] LIMITED CHEST      FINDINGS:  Lungs: Bilateral B-lines (L>R), trace R pleural effusion, no large consolidation noted    Cardiac: difficult views: grossly normal LV function, RV smaller than LV, no pericardial effusion, IVC indeterminate range (not small)      INTERPRETATION:  Alveolar interstitial pattern without significant consolidation or effusion  Grossly normal cardiac function    Images saved to [employer]    Attending Attestation:  Agree with above. I was present for the entire procedure and have reviewed all images.

## 2025-01-23 NOTE — RAPID RESPONSE TEAM SUMMARY - NSAIRWAYBREATHINGRRT_GEN_ALL_CORE
NIPPV/CPAP
Bag mask/Intubated/Nebulizer treatment/O2 mask/nasal cannula
ABG/O2 mask/nasal cannula
Intubated/NIPPV/CPAP

## 2025-01-23 NOTE — PROVIDER CONTACT NOTE (OTHER) - REASON
Pt. went to MRI & pt. had possible seizure & unresponsive at 13:35Hrs  & pt. came to [facility] at 14:10Hrs.
BP recheck 210/98
/139  T 100.6
Pt. having sezure like activity. Pt. moving pt.'s B/L arms & face vigorously side to side. pt.'s face looking blue & not breathing.
Pt.'s Lactate Level=5.6 during RRT at 14:15Hrs.
Pt.'s b/P 204/113,  & Pt. c/o of shaking
Pt hypertensive upon arrival to unit

## 2025-01-23 NOTE — CHART NOTE - NSCHARTNOTEFT_GEN_A_CORE
Interim events: RRT called today at 07:30 for hypoxia and respiratory distress, patient was later intubated in emergency airway management. Transferred to MICU. Primary team reached out to neurology for ASM management, d/t inability to convert Lamotrigine as PO given intubated status.     Impression: Multiple GTCs with worsening of frequency iso worsening flash pulm edema and respiratory distress related to fever. Recommend following up sepsis w/u.     Recommendations:  -Discontinue Lamotrigine --> switch to Depakote 500mg IV q8 instead  -Continue with current ASM regimen: Vimpat 100mg IV BID, Keppra 1500mg IV BID, Gabapentin 300mg IV BID, Depakote 500mg IV TID  -Continue to treat above medical problems, as you are doing    Neurology to continue to follow patient with you. Thank you [id number]. Interim events: RRT called today at 07:30 for hypoxia and respiratory distress, patient was later intubated in emergency airway management. Transferred to MICU. Primary team reached out to neurology for ASM management, d/t inability to convert Lamotrigine as PO given intubated status.     Impression: Multiple GTCs with worsening of frequency iso worsening flash pulm edema and respiratory distress related to fever. Recommend following up sepsis w/u.     Recommendations:  -If unable to tolerate Lamotrigine 100mg BID PO --> switch to Depakote 500mg IV q8 instead  -Continue with rest of current ASM regimen: Vimpat 100mg IV BID, Keppra 1500mg IV BID, Gabapentin 300mg IV BID  -Continue to treat above medical problems, as you are doing    Neurology to continue to follow patient with you. Thank you [phone].

## 2025-01-23 NOTE — AIRWAY PLACEMENT NOTE ADULT - AIRWAY COMMENTS:
Patient received intubated from RRT
Atraumatic, no aspiration noted.  Phenylephrine 500 mcg total administered after intubation.  Final SBP > 110 mmHg, .
Grade 1 view with glidescope

## 2025-01-23 NOTE — PROGRESS NOTE ADULT - ASSESSMENT
69M w/ hx of metastatic testicular cancer (s/p L orchiectomy, on etoposide/cisplatin chemo), epilepsy (grand-mal seizures), schizophrenia, developmental delay, HTN, HLD, VAZQUEZ, stage III CKD, severe obesity, secondary hyperparathyroidism, anemia, thrombocytopenia who presented to ED for fall, possible seizure prior.    Plan:    # Hypoxia: flash pulm edema 2/2 HTN vs. aspiration event vs. respiratory distress related to fever  - S/p RRT 1/23 am  - Now on NIV,wean as tolerated  - Sepsis protocol initiated  - Started on IV abx  - Tachy/ HTN >200 systolic-> likely from fevers-> Cards eval pending (called)  - CXR w/ mild pulm congestion.  - ID aware  - Pulm eval pending (called)    # Syncopal seizure:  -  Patient with hx of epilepsy, generalized grand mal seizures  - C/w Lamictal and Keppra  - CTH/C spine/MF negative for acute ICH, notable for mild R hematoma  - 1/17 sp multiple RRTs  for grand mal seizure activity and was initially recommended sepsis workup and antipyretics given T103F, but patient had x2 more episode and was ultimately given ativan 2mg, vimpat load 200mg, and intubated for airway protection with MICU transfer.   - Extubated 1/20  - Keppra 1.5g BID, Lamotrigine 100mg BID, Gabapentin 300mg BID, Vimpat 150mg BID  - Ativan for seizures PRM  - Per Neuro -> MRI brain w and wo contrast to look for potential seizure foci that could cause increased frequency especially given hx of metastatic testicular cancer; deferred MRI given copious secretions   - CT head with no brain mets  - s/p vEEG, negative for seizures  - Care per MICU-> downgraded 1/21    # Flu:  - Continue tamiflu to complete 5d on 1/22   - Monitor temps/WBC  - ID following    # CKD3:  - Monitor I/O's  - Serial Cr  - Avoid nephrotoxins  - Renally dose medications    # HTN/ HLD:  - C/w CV meds  - Post extubation concerns for Flash pulmonary edema with SBP 180s, received Hydralazine 10 x 2  - Echo with Left ventricular systolic function is normal with an ejection fraction of 62 %. There are no regional wall motion abnormalities seen. Normal right ventricular cavity size and normal right ventricular systolic function.    # Pneumonia:  - C/w IV abx-> Completed ceftriaxone 5d course on 1/21  - ID following    # Schizophrenia/Depression:  - C/w home meds    # VAZQUEZ:  - CPAP at night    # Hx of metastatic testicular cancer, s/p L orchiectomy, on etoposide/cisplatin chemo, last dose on 6/21/24:  - Outpt Oncology follow-up    # DVT ppx:  - Heparin sq    [facility]  [phone]

## 2025-01-23 NOTE — PROVIDER CONTACT NOTE (OTHER) - DATE AND TIME:
17-Jan-2025 18:15
17-Jan-2025 18:47
17-Jan-2025 14:10
22-Jan-2025 23:24
17-Jan-2025 15:00
22-Jan-2025 00:45
21-Jan-2025 21:17

## 2025-01-23 NOTE — CONSULT NOTE ADULT - ASSESSMENT
68 y/o M with PMH of metastatic testicular cancer (s/p L orchiectomy, on etoposide/cisplatin chemo, last dose 6/2024), epilepsy (grand-mal seizures), schizophrenia, developmental delay, HTN, HLD, VAZQUEZ, stage III CKD, severe obesity, secondary hyperparathyroidism, anemia, thrombocytopenia who presented to ED for fall, possible seizure prior. On 1/17 patient had multiple RRTs called for grand mal seizure activity and  patient had x2 more episode and was ultimately given ativan 2mg, Vimpat load 200mg, and intubated for airway protection with MICU transfer. Extubated 1/19 - to AVAPS, post extubation with increased secretion. Downgraded to medical floor 1/21. Pt still with fevers, s/p RRT 1/23 AM for fever, hypoxia.  AVAPS - O2 sats 98% but tachypneic to 40, tachycardic, pt endorsing SOB. RRT ---> intubated for respiratory failure. also febrile and Hypertensive. CKD      1- CKD III   2- CHF   3- fevers  4- tachycardia  5- respiratory failure  6- HTN     cr seems to overall be improving over the course of the hosp and has been steady baseline   bp is quite elevated   sedation and if still with htn in this range then add iv hydralazine  diurese bumex 1 mg ivp   cefepime 1 g iv bid   pan culture   broad spectrum antibiotic   vent support

## 2025-01-23 NOTE — CONSULT NOTE ADULT - CONSULT REASON
Hypoxia c/f Pulmonary Edema, Hyperyensive Urgency, Hypoxia c/f Pulmonary Edema, Hypertensive Urgency,

## 2025-01-23 NOTE — PROVIDER CONTACT NOTE (OTHER) - ACTION/TREATMENT ORDERED:
Lactate Level to be repeated in 6hrs/ANP. Cont. to monitor pt.
Rhina MEDINA notified and aware. One time dose of IV labetalol ordered and administered. Continue to monitor patient.
Rhina MEDINA notified. IV tylenol ordered and administered. Sepsis workup ordered. One time dose of amlodipine and lisinopril PO ordered and administered.
RRT in progress.
Cont. to monitor pt. /ANP.
PA notified and aware. STAT dose of amlodipine and lisinopril administered as ordered
Put pt. on 100% NRBM & RRT in progress.

## 2025-01-23 NOTE — PROGRESS NOTE ADULT - ASSESSMENT
Patient is a 69 year old male with PMH of metastatic testicular cancer (s/p L orchiectomy, on etoposide/cisplatin chemo, last dose 6/2024), epilepsy (grand-mal seizures), schizophrenia, developmental delay, HTN, HLD, VAZQUEZ, stage III CKD, severe obesity, secondary hyperparathyroidism, anemia, thrombocytopenia who presented to ED for fall, possible seizure prior. Patient reported cough for few weeks with no other respiratory symptoms.   Admitted for further work up for syncope, c/f seizure   Influenza A  1/17 s/p RRTs for grand mal seizure activity, s/p intubation and transfer to MICU  - 1/17 CT chest with anterior bowing of posterior tracheal wall suggestive of tracheomalacia, trace R pleural effusion   - MRSA PCR screen negative    - sputum culture negative    - s/p extubation 1/19   - 1/20 Bcx NGTD    - s/p ceftriaxone 1/17-1/21   - s/p tamiflu 1/17-1/21    Sepsis vs sirs   Hypoxia possible flash pulmonary edema iso HTN vs aspiration   - 1/23 s/p RRT am for hypoxia/inc WOB, febrile last night 100.6F and now 101F this am, tachycardia, leukocytosis 11k   - CXR with pulm vascular congestion, no consolidation noted    - s/p bumex, started AVAPS   - s/p zosyn for possible aspiration pneumonia    - reported occ dysuria, UA negative for pyuria     Recommendations:   Follow Bcx x2, in process   Send full RVP, Ucx  Started on cefepime 2g IV Q12h (renally dosed)  Diuresis as tolerated   Supplemental O2 as needed   Supportive care  Monitor temps/WBC  Aspiration precautions   Continue rest of care per primary team       Greyson Mart M.D.  [facility]  Available on [employer] TEAMS - *PREFERRED*  [phone]  After 5pm on weekdays and all day on weekends - please call [phone]     Thank you for consulting us and involving us in the management of this patients case. In addition to reviewing history, imaging, documents, labs, microbiology, took into account antibiotic stewardship, local antibiogram and infection control strategies and potential transmission issues.

## 2025-01-23 NOTE — RAPID RESPONSE TEAM SUMMARY - NSOTHERINTERVENTIONSRRT_GEN_ALL_CORE
Pt brought back to [facility]. Sepsis workup initiated, blood cultures sent, lactate came back 5.4 - unclear if iso sepsis vs. seizure activity. Primary team to follow up with neurology, follow up labs, including repeat lactate in 6 hours. 
Intubation
As above

## 2025-01-23 NOTE — CHART NOTE - NSCHARTNOTEFT_GEN_A_CORE
MICU Accept Note    CHIEF COMPLAINT:     HPI / INTERVAL HISTORY:        PAST MEDICAL & SURGICAL HISTORY:  Hypertension, unspecified type      Other hyperlipidemia      History of epilepsy      Paranoid schizophrenia      Obstructive sleep apnea      Testicular cancer      H/O Spinal surgery          FAMILY HISTORY:      SOCIAL HISTORY:  Smoking:   Substance Use:   EtOH Use:   Marital Status:   Sexual History:   Occupation:  Recent Travel:  Country of Birth:   Advance Directives:     HOME MEDICATIONS:      Allergies    penicillin (Hives)  seasonal allergies (Unknown)    Intolerances    latex (Rash)        REVIEW OF SYSTEMS:  Constitutional: No fevers, chills, weight loss, weight gain  HEENT: No vision problems, eye pain, nasal congestion, rhinorrhea, sore throat, dysphagia  CV: No chest pain, orthopnea, palpitations  Resp: No cough, dyspnea, wheezing, hemoptysis  GI: No nausea, vomiting, diarrhea, constipation, abdominal pain  : [ ] dysuria [ ] nocturia [ ] hematuria [ ] increased urinary frequency  Musculoskeletal: [ ] back pain [ ] myalgias [ ] arthralgias [ ] fracture  Skin: [ ] rash [ ] itch  Neurological: [ ] headache [ ] dizziness [ ] syncope [ ] weakness [ ] numbness  Psychiatric: [ ] anxiety [ ] depression  Endocrine: [ ] diabetes [ ] thyroid problem  Hematologic/Lymphatic: [ ] anemia [ ] bleeding problem  Allergic/Immunologic: [ ] itchy eyes [ ] nasal discharge [ ] hives [ ] angioedema  [ ] All other systems negative  [ ] Unable to assess ROS because ________    OBJECTIVE:  ICU Vital Signs Last 24 Hrs  T(C): 39.2 (2025 15:00), Max: 39.3 (2025 12:23)  T(F): 102.6 (2025 15:00), Max: 102.7 (2025 12:23)  HR: 110 (2025 15:25) (90 - 147)  BP: 87/53 (2025 15:25) (87/53 - 223/139)  BP(mean): 65 (2025 15:25) (65 - 143)  ABP: --  ABP(mean): --  RR: 32 (2025 15:25) (18 - 48)  SpO2: 91% (2025 15:25) (90% - 97%)    O2 Parameters below as of 2025 14:22  Patient On (Oxygen Delivery Method): ventilator          Mode: AC/ CMV (Assist Control/ Continuous Mandatory Ventilation), RR (machine): 24, TV (machine): 450, FiO2: 100, PEEP: 5, ITime: 1, MAP: 15, PIP: 33     @ 07:01  -   @ 07:00  --------------------------------------------------------  IN: 0 mL / OUT: 1400 mL / NET: -1400 mL      CAPILLARY BLOOD GLUCOSE      POCT Blood Glucose.: 244 mg/dL (2025 13:23)      =================PHYSICAL EXAM=================    GENERAL: Laying comfortably, NAD  EYES: EOMI, PERRL, no scleral icterus  NECK: No JVD  LUNG: Clear to auscultation bilaterally; No wheeze, crackles or rhonci  HEART: Regular rate and rhythm; No murmurs, rubs, or gallops  ABDOMEN: Soft, Nontender, Nondistended  EXTREMITIES:  No LE edema, 2+ Peripheral Pulses, No clubbing, cyanosis, or edema  PSYCH: AAOx3  NEUROLOGY: non-focal, strength 5/5 in all extremities, sensation intact  SKIN: No rashes or lesions    =================================================    LABS:                        12.0   13.21 )-----------( 135      ( 2025 15:10 )             37.8     Hgb Trend: 12.0<--, 12.4<--, 10.3<--, 10.4<--, 10.6<--      138  |  104  |  38[H]  ----------------------------<  157[H]  4.8   |  19[L]  |  2.09[H]    Ca    9.7      2025 07:38  Phos  3.5       Mg     2.2         TPro  6.8  /  Alb  3.7  /  TBili  0.7  /  DBili  x   /  AST  149[H]  /  ALT  170[H]  /  AlkPhos  194[H]      Creatinine Trend: 2.09<--, 2.08<--, 2.23<--, 2.53<--, 2.49<--, 2.57<--  PT/INR - ( 2025 15:10 )   PT: 11.6 sec;   INR: 1.02 ratio         PTT - ( 2025 15:10 )  PTT:32.1 sec  Urinalysis Basic - ( 2025 15:33 )    Color: Yellow / Appearance: Clear / S.022 / pH: x  Gluc: x / Ketone: Trace mg/dL  / Bili: Negative / Urobili: 0.2 mg/dL   Blood: x / Protein: >=1000 mg/dL / Nitrite: Negative   Leuk Esterase: Negative / RBC: 2 /HPF / WBC 2 /HPF   Sq Epi: x / Non Sq Epi: 4 /HPF / Bacteria: Negative /HPF      Arterial Blood Gas:   @ 14:55  7.30/38/85/19/95.1/-7.1  ABG lactate: --  Arterial Blood Gas:   @ 06:57  7.35/37/58/20/89.0/-4.6  ABG lactate: --      Mode: AC/ CMV (Assist Control/ Continuous Mandatory Ventilation), RR (machine): 24, TV (machine): 450, FiO2: 100, PEEP: 5, ITime: 1, MAP: 15, PIP: 33    LINES:     HOSPITAL MEDICATIONS:  MEDICATIONS  (STANDING):  albuterol/ipratropium for Nebulization 3 milliLiter(s) Nebulizer every 6 hours  amLODIPine   Tablet 10 milliGRAM(s) Oral daily  atorvastatin 20 milliGRAM(s) Oral at bedtime  bacitracin   Ointment 1 Application(s) Topical two times a day  cefepime   IVPB 2000 milliGRAM(s) IV Intermittent once  cefepime   IVPB 2000 milliGRAM(s) IV Intermittent every 12 hours  cefepime   IVPB      chlorhexidine 0.12% Liquid 15 milliLiter(s) Oral Mucosa every 12 hours  chlorhexidine 2% Cloths 1 Application(s) Topical <User Schedule>  chlorhexidine 2% Cloths 1 Application(s) Topical <User Schedule>  escitalopram 15 milliGRAM(s) Oral daily  gabapentin Solution 300 milliGRAM(s) Oral two times a day  heparin   Injectable 7500 Unit(s) SubCutaneous every 12 hours  influenza  Vaccine (HIGH DOSE) 0.5 milliLiter(s) IntraMuscular once  lacosamide IVPB 100 milliGRAM(s) IV Intermittent every 12 hours  lamoTRIgine 100 milliGRAM(s) Oral two times a day  levETIRAcetam  IVPB 1500 milliGRAM(s) IV Intermittent two times a day  lidocaine   4% Patch 1 Patch Transdermal daily  lurasidone 40 milliGRAM(s) Oral daily  norepinephrine Infusion 0.05 MICROgram(s)/kG/Min (9.68 mL/Hr) IV Continuous <Continuous>  propofol Infusion 50 MICROgram(s)/kG/Min (31 mL/Hr) IV Continuous <Continuous>  sodium chloride 3%  Inhalation 4 milliLiter(s) Inhalation every 12 hours    MEDICATIONS  (PRN):  fentaNYL    Injectable 25 MICROGram(s) IV Push every 4 hours PRN Moderate Pain (4 - 6)  nystatin Powder 1 Application(s) Topical three times a day PRN fungal infection you may see and want to treat        MICROBIOLOGY:     RADIOLOGY & ADDITIONAL TESTS:      ASSESSMENT AND PLAN:        === Neuro ===  1.    === Resp ===  1.    === CV ===  1.    === GI===  1.    ==== Renal/ ===  1.    === ID ===  1.    === Endo ===  1.    === Heme ===  1.    ===Ethics ===  1. MICU Accept Note    CHIEF COMPLAINT:     HPI / INTERVAL HISTORY:        PAST MEDICAL & SURGICAL HISTORY:  Hypertension, unspecified type      Other hyperlipidemia      History of epilepsy      Paranoid schizophrenia      Obstructive sleep apnea      Testicular cancer      H/O Spinal surgery          FAMILY HISTORY:      SOCIAL HISTORY:  Smoking:   Substance Use:   EtOH Use:   Marital Status:   Sexual History:   Occupation:  Recent Travel:  Country of Birth:   Advance Directives:     HOME MEDICATIONS:      Allergies    penicillin (Hives)  seasonal allergies (Unknown)    Intolerances    latex (Rash)        REVIEW OF SYSTEMS:  Constitutional: No fevers, chills, weight loss, weight gain  HEENT: No vision problems, eye pain, nasal congestion, rhinorrhea, sore throat, dysphagia  CV: No chest pain, orthopnea, palpitations  Resp: No cough, dyspnea, wheezing, hemoptysis  GI: No nausea, vomiting, diarrhea, constipation, abdominal pain  : [ ] dysuria [ ] nocturia [ ] hematuria [ ] increased urinary frequency  Musculoskeletal: [ ] back pain [ ] myalgias [ ] arthralgias [ ] fracture  Skin: [ ] rash [ ] itch  Neurological: [ ] headache [ ] dizziness [ ] syncope [ ] weakness [ ] numbness  Psychiatric: [ ] anxiety [ ] depression  Endocrine: [ ] diabetes [ ] thyroid problem  Hematologic/Lymphatic: [ ] anemia [ ] bleeding problem  Allergic/Immunologic: [ ] itchy eyes [ ] nasal discharge [ ] hives [ ] angioedema  [ ] All other systems negative  [ ] Unable to assess ROS because ________    OBJECTIVE:  ICU Vital Signs Last 24 Hrs  T(C): 39.2 (2025 15:00), Max: 39.3 (2025 12:23)  T(F): 102.6 (2025 15:00), Max: 102.7 (2025 12:23)  HR: 110 (2025 15:25) (90 - 147)  BP: 87/53 (2025 15:25) (87/53 - 223/139)  BP(mean): 65 (2025 15:25) (65 - 143)  ABP: --  ABP(mean): --  RR: 32 (2025 15:25) (18 - 48)  SpO2: 91% (2025 15:25) (90% - 97%)    O2 Parameters below as of 2025 14:22  Patient On (Oxygen Delivery Method): ventilator          Mode: AC/ CMV (Assist Control/ Continuous Mandatory Ventilation), RR (machine): 24, TV (machine): 450, FiO2: 100, PEEP: 5, ITime: 1, MAP: 15, PIP: 33     @ 07:01  -   @ 07:00  --------------------------------------------------------  IN: 0 mL / OUT: 1400 mL / NET: -1400 mL      CAPILLARY BLOOD GLUCOSE      POCT Blood Glucose.: 244 mg/dL (2025 13:23)      =================PHYSICAL EXAM=================    GENERAL: Laying comfortably, NAD  EYES: EOMI, PERRL, no scleral icterus  NECK: No JVD  LUNG: Clear to auscultation bilaterally; No wheeze, crackles or rhonci  HEART: Regular rate and rhythm; No murmurs, rubs, or gallops  ABDOMEN: Soft, Nontender, Nondistended  EXTREMITIES:  No LE edema, 2+ Peripheral Pulses, No clubbing, cyanosis, or edema  PSYCH: AAOx3  NEUROLOGY: non-focal, strength 5/5 in all extremities, sensation intact  SKIN: No rashes or lesions    =================================================    LABS:                        12.0   13.21 )-----------( 135      ( 2025 15:10 )             37.8     Hgb Trend: 12.0<--, 12.4<--, 10.3<--, 10.4<--, 10.6<--      138  |  104  |  38[H]  ----------------------------<  157[H]  4.8   |  19[L]  |  2.09[H]    Ca    9.7      2025 07:38  Phos  3.5       Mg     2.2         TPro  6.8  /  Alb  3.7  /  TBili  0.7  /  DBili  x   /  AST  149[H]  /  ALT  170[H]  /  AlkPhos  194[H]      Creatinine Trend: 2.09<--, 2.08<--, 2.23<--, 2.53<--, 2.49<--, 2.57<--  PT/INR - ( 2025 15:10 )   PT: 11.6 sec;   INR: 1.02 ratio         PTT - ( 2025 15:10 )  PTT:32.1 sec  Urinalysis Basic - ( 2025 15:33 )    Color: Yellow / Appearance: Clear / S.022 / pH: x  Gluc: x / Ketone: Trace mg/dL  / Bili: Negative / Urobili: 0.2 mg/dL   Blood: x / Protein: >=1000 mg/dL / Nitrite: Negative   Leuk Esterase: Negative / RBC: 2 /HPF / WBC 2 /HPF   Sq Epi: x / Non Sq Epi: 4 /HPF / Bacteria: Negative /HPF      Arterial Blood Gas:   @ 14:55  7.30/38/85/19/95.1/-7.1  ABG lactate: --  Arterial Blood Gas:   @ 06:57  7.35/37/58/20/89.0/-4.6  ABG lactate: --      Mode: AC/ CMV (Assist Control/ Continuous Mandatory Ventilation), RR (machine): 24, TV (machine): 450, FiO2: 100, PEEP: 5, ITime: 1, MAP: 15, PIP: 33    LINES:     HOSPITAL MEDICATIONS:  MEDICATIONS  (STANDING):  albuterol/ipratropium for Nebulization 3 milliLiter(s) Nebulizer every 6 hours  amLODIPine   Tablet 10 milliGRAM(s) Oral daily  atorvastatin 20 milliGRAM(s) Oral at bedtime  bacitracin   Ointment 1 Application(s) Topical two times a day  cefepime   IVPB 2000 milliGRAM(s) IV Intermittent once  cefepime   IVPB 2000 milliGRAM(s) IV Intermittent every 12 hours  cefepime   IVPB      chlorhexidine 0.12% Liquid 15 milliLiter(s) Oral Mucosa every 12 hours  chlorhexidine 2% Cloths 1 Application(s) Topical <User Schedule>  chlorhexidine 2% Cloths 1 Application(s) Topical <User Schedule>  escitalopram 15 milliGRAM(s) Oral daily  gabapentin Solution 300 milliGRAM(s) Oral two times a day  heparin   Injectable 7500 Unit(s) SubCutaneous every 12 hours  influenza  Vaccine (HIGH DOSE) 0.5 milliLiter(s) IntraMuscular once  lacosamide IVPB 100 milliGRAM(s) IV Intermittent every 12 hours  lamoTRIgine 100 milliGRAM(s) Oral two times a day  levETIRAcetam  IVPB 1500 milliGRAM(s) IV Intermittent two times a day  lidocaine   4% Patch 1 Patch Transdermal daily  lurasidone 40 milliGRAM(s) Oral daily  norepinephrine Infusion 0.05 MICROgram(s)/kG/Min (9.68 mL/Hr) IV Continuous <Continuous>  propofol Infusion 50 MICROgram(s)/kG/Min (31 mL/Hr) IV Continuous <Continuous>  sodium chloride 3%  Inhalation 4 milliLiter(s) Inhalation every 12 hours    MEDICATIONS  (PRN):  fentaNYL    Injectable 25 MICROGram(s) IV Push every 4 hours PRN Moderate Pain (4 - 6)  nystatin Powder 1 Application(s) Topical three times a day PRN fungal infection you may see and want to treat        MICROBIOLOGY:     RADIOLOGY & ADDITIONAL TESTS:      ASSESSMENT AND PLAN:  69M w/ hx of metastatic testicular cancer (s/p L orchiectomy, on etoposide/cisplatin chemo, last dose 2024), epilepsy (grand-mal seizures), schizophrenia, developmental delay, HTN, HLD, VAZQUEZ, stage III CKD, obesity, secondary hyperparathyroidism, anemia, thrombocytopenia, ? spinal surgery who was admitted on 25 for fall and syncope. On  patient had multiple RRTs called for grand mal seizure activity and  patient had x2 more episode and was ultimately given ativan 2mg, Vimpat load 200mg, and intubated for airway protection with MICU transfer. Extubated  - to AVAPS, post extubation with increased secretion, now better and Hemodynamically stable to downgrade to medicine floor with pulse oximetry.      === Neuro ===  #    #    === Resp ===      === CV ===      === GI===      ==== Renal/ ===  1.    === ID ===  1.    === Endo ===  1.    === Heme ===  1.    ===Ethics ===  1. MICU Accept Note    CHIEF COMPLAINT:     HPI / INTERVAL HISTORY:  69M w/ hx of metastatic testicular cancer (s/p L orchiectomy, on etoposide/cisplatin chemo, last dose 2024), epilepsy (grand-mal seizures), schizophrenia, developmental delay, HTN, HLD, VAZQUEZ, stage III CKD, obesity, secondary hyperparathyroidism, anemia, thrombocytopenia, ? spinal surgery who was admitted on 25 for fall and syncope. On  patient had multiple RRTs called for grand mal seizure activity and  patient had x2 more episode and was ultimately given ativan 2mg, Vimpat load 200mg, and intubated for airway protection with MICU transfer. Extubated  - to AVAPS, post extubation with increased secretion, now better and Hemodynamically stable to downgrade to medicine floor with pulse oximetry.      PAST MEDICAL & SURGICAL HISTORY:  Hypertension, unspecified type      Other hyperlipidemia      History of epilepsy      Paranoid schizophrenia      Obstructive sleep apnea      Testicular cancer      H/O Spinal surgery          FAMILY HISTORY:      SOCIAL HISTORY:  Smoking:   Substance Use:   EtOH Use:   Marital Status:   Sexual History:   Occupation:  Recent Travel:  Country of Birth:   Advance Directives:     HOME MEDICATIONS:      Allergies    penicillin (Hives)  seasonal allergies (Unknown)    Intolerances    latex (Rash)        REVIEW OF SYSTEMS:  Constitutional: No fevers, chills, weight loss, weight gain  HEENT: No vision problems, eye pain, nasal congestion, rhinorrhea, sore throat, dysphagia  CV: No chest pain, orthopnea, palpitations  Resp: No cough, dyspnea, wheezing, hemoptysis  GI: No nausea, vomiting, diarrhea, constipation, abdominal pain  : [ ] dysuria [ ] nocturia [ ] hematuria [ ] increased urinary frequency  Musculoskeletal: [ ] back pain [ ] myalgias [ ] arthralgias [ ] fracture  Skin: [ ] rash [ ] itch  Neurological: [ ] headache [ ] dizziness [ ] syncope [ ] weakness [ ] numbness  Psychiatric: [ ] anxiety [ ] depression  Endocrine: [ ] diabetes [ ] thyroid problem  Hematologic/Lymphatic: [ ] anemia [ ] bleeding problem  Allergic/Immunologic: [ ] itchy eyes [ ] nasal discharge [ ] hives [ ] angioedema  [ ] All other systems negative  [ ] Unable to assess ROS because ________    OBJECTIVE:  ICU Vital Signs Last 24 Hrs  T(C): 39.2 (2025 15:00), Max: 39.3 (2025 12:23)  T(F): 102.6 (2025 15:00), Max: 102.7 (2025 12:23)  HR: 110 (2025 15:25) (90 - 147)  BP: 87/53 (2025 15:25) (87/53 - 223/139)  BP(mean): 65 (2025 15:25) (65 - 143)  ABP: --  ABP(mean): --  RR: 32 (2025 15:25) (18 - 48)  SpO2: 91% (2025 15:25) (90% - 97%)    O2 Parameters below as of 2025 14:22  Patient On (Oxygen Delivery Method): ventilator          Mode: AC/ CMV (Assist Control/ Continuous Mandatory Ventilation), RR (machine): 24, TV (machine): 450, FiO2: 100, PEEP: 5, ITime: 1, MAP: 15, PIP: 33    - @ 07:01  -  - @ 07:00  --------------------------------------------------------  IN: 0 mL / OUT: 1400 mL / NET: -1400 mL      CAPILLARY BLOOD GLUCOSE      POCT Blood Glucose.: 244 mg/dL (2025 13:23)      =================PHYSICAL EXAM=================    GENERAL: Laying comfortably, NAD  EYES: EOMI, PERRL, no scleral icterus  NECK: No JVD  LUNG: Clear to auscultation bilaterally; No wheeze, crackles or rhonci  HEART: Regular rate and rhythm; No murmurs, rubs, or gallops  ABDOMEN: Soft, Nontender, Nondistended  EXTREMITIES:  No LE edema, 2+ Peripheral Pulses, No clubbing, cyanosis, or edema  PSYCH: AAOx3  NEUROLOGY: non-focal, strength 5/5 in all extremities, sensation intact  SKIN: No rashes or lesions    =================================================    LABS:                        12.0   13.21 )-----------( 135      ( 2025 15:10 )             37.8     Hgb Trend: 12.0<--, 12.4<--, 10.3<--, 10.4<--, 10.6<--      138  |  104  |  38[H]  ----------------------------<  157[H]  4.8   |  19[L]  |  2.09[H]    Ca    9.7      2025 07:38  Phos  3.5       Mg     2.2         TPro  6.8  /  Alb  3.7  /  TBili  0.7  /  DBili  x   /  AST  149[H]  /  ALT  170[H]  /  AlkPhos  194[H]      Creatinine Trend: 2.09<--, 2.08<--, 2.23<--, 2.53<--, 2.49<--, 2.57<--  PT/INR - ( 2025 15:10 )   PT: 11.6 sec;   INR: 1.02 ratio         PTT - ( 2025 15:10 )  PTT:32.1 sec  Urinalysis Basic - ( 2025 15:33 )    Color: Yellow / Appearance: Clear / S.022 / pH: x  Gluc: x / Ketone: Trace mg/dL  / Bili: Negative / Urobili: 0.2 mg/dL   Blood: x / Protein: >=1000 mg/dL / Nitrite: Negative   Leuk Esterase: Negative / RBC: 2 /HPF / WBC 2 /HPF   Sq Epi: x / Non Sq Epi: 4 /HPF / Bacteria: Negative /HPF      Arterial Blood Gas:   @ 14:55  7.30/38/85/19/95.1/-7.1  ABG lactate: --  Arterial Blood Gas:   @ 06:57  7.35/37/58/20/89.0/-4.6  ABG lactate: --      Mode: AC/ CMV (Assist Control/ Continuous Mandatory Ventilation), RR (machine): 24, TV (machine): 450, FiO2: 100, PEEP: 5, ITime: 1, MAP: 15, PIP: 33    LINES:     HOSPITAL MEDICATIONS:  MEDICATIONS  (STANDING):  albuterol/ipratropium for Nebulization 3 milliLiter(s) Nebulizer every 6 hours  amLODIPine   Tablet 10 milliGRAM(s) Oral daily  atorvastatin 20 milliGRAM(s) Oral at bedtime  bacitracin   Ointment 1 Application(s) Topical two times a day  cefepime   IVPB 2000 milliGRAM(s) IV Intermittent once  cefepime   IVPB 2000 milliGRAM(s) IV Intermittent every 12 hours  cefepime   IVPB      chlorhexidine 0.12% Liquid 15 milliLiter(s) Oral Mucosa every 12 hours  chlorhexidine 2% Cloths 1 Application(s) Topical <User Schedule>  chlorhexidine 2% Cloths 1 Application(s) Topical <User Schedule>  escitalopram 15 milliGRAM(s) Oral daily  gabapentin Solution 300 milliGRAM(s) Oral two times a day  heparin   Injectable 7500 Unit(s) SubCutaneous every 12 hours  influenza  Vaccine (HIGH DOSE) 0.5 milliLiter(s) IntraMuscular once  lacosamide IVPB 100 milliGRAM(s) IV Intermittent every 12 hours  lamoTRIgine 100 milliGRAM(s) Oral two times a day  levETIRAcetam  IVPB 1500 milliGRAM(s) IV Intermittent two times a day  lidocaine   4% Patch 1 Patch Transdermal daily  lurasidone 40 milliGRAM(s) Oral daily  norepinephrine Infusion 0.05 MICROgram(s)/kG/Min (9.68 mL/Hr) IV Continuous <Continuous>  propofol Infusion 50 MICROgram(s)/kG/Min (31 mL/Hr) IV Continuous <Continuous>  sodium chloride 3%  Inhalation 4 milliLiter(s) Inhalation every 12 hours    MEDICATIONS  (PRN):  fentaNYL    Injectable 25 MICROGram(s) IV Push every 4 hours PRN Moderate Pain (4 - 6)  nystatin Powder 1 Application(s) Topical three times a day PRN fungal infection you may see and want to treat        MICROBIOLOGY:     RADIOLOGY & ADDITIONAL TESTS:      ASSESSMENT AND PLAN:  69M w/ hx of metastatic testicular cancer (s/p L orchiectomy, on etoposide/cisplatin chemo, last dose 2024), epilepsy (grand-mal seizures), schizophrenia, developmental delay, HTN, HLD, VAZQUEZ, stage III CKD, obesity, secondary hyperparathyroidism, anemia, thrombocytopenia, ? spinal surgery who was admitted on 25 for fall and syncope. On  patient had multiple RRTs called for grand mal seizure activity and  patient had x2 more episode and was ultimately given ativan 2mg, Vimpat load 200mg, and intubated for airway protection with MICU transfer. Extubated  - to AVAPS, post extubation with increased secretion, now better; downgraded ; While on medical floors; patient developed respiratory distress; now intubated for suspected aspiration pna vs flash pulmonary edema.         ===NEURO===  #Status Epilepticus  #Grand Mal Seizures  #Schizophrenia  #Depression  - c/w AEDs: Keppra 1.5g BID, Lamotrigine 100mg BID, Gabapentin 300mg BID, Vimpat 150mg BID  - Ativan for seizures prn  -per Neuro MRI brain w and wo contrast to look for potential seizure foci that could cause increased frequency especially given hx of metastatic testicular cancer  - Deferred MRI given copious secretions   - CT head with no brain mets  - s/p vEEG, negative for seizures  - Neuro following, appreciate recs  - c/w Escitalopram and Lurasidone    ===CVS===  #HTN #HLD   - Atorvastatin 20mg qhs  -post extubation concerns for Flash pulmonary edema with SBP 180s, received Hydralazine 10 x 2  - echo with Left ventricular systolic function is normal with an ejection fraction of 62 %.There are no regional wall motion abnormalities seen. Normal right ventricular cavity size and normal right ventricular systolic function.    ===PULM===  #s/p Intubation extubated  to AVAPS  #VAZQUEZ  - Oxygen by NC as needed  - AVAPS at night  - Secretions better today, still need suctioning q4-6hrs  -  CT chest with anterior bowing of the posterior tracheal wall, suggestive of tracheomalacia.  and trace right pleural effusion.    ===RENAL===  #Metabolic acidosis  #Lactic acidosis  #SHAGUFTA on CKD Stage III  #Elevated CK, downtrending 1000s to 300s  - Trending labs, monitor electrolytes, I/O , using condom cath    ===GI===  #Diet,NPO #OG  - Tube feeds, ok to crush pills pending SLP eval  - Formal SLP consulted - Pt failed official study today, s/p FEESST study  -ordered soft bite size diet with mildly thickened liquid  - last BM from     ===ID===  #Pneumonia  #Flu A positive  - Ceftriaxone 1g daily (-)  - Tamiflu 30mg BID (-)  - Follow cultures: BCx, Bronch (all neg to date)  - ID following    ===ENDO===  #At risk of hypoglycemia  #Hx of secondary hyperparathyroidism  - FS q6h while NPO    ===HEME/ONC===  #Metastatic testicular cancer, recent chemo 2024  #DVT ppx  - 7500U subQ heparin    ===ETHICS===  #Code Status: FULL CODE MICU Accept Note    CHIEF COMPLAINT:     HPI / INTERVAL HISTORY:  69M w/ hx of metastatic testicular cancer (s/p L orchiectomy, on etoposide/cisplatin chemo, last dose 2024), epilepsy (grand-mal seizures), schizophrenia, developmental delay, HTN, HLD, VAZQUEZ, stage III CKD, obesity, secondary hyperparathyroidism, anemia, thrombocytopenia, ? spinal surgery who was admitted on 25 for fall and syncope. On  patient had multiple RRTs called for grand mal seizure activity and  patient had x2 more episode and was ultimately given ativan 2mg, Vimpat load 200mg, and intubated for airway protection with MICU transfer. Extubated  - to AVAPS, post extubation with increased secretion, now better and Hemodynamically stable to downgrade to medicine floor with pulse oximetry.    WHile on the medical floors; Patient continued treatment for Influenza, Vimpat was decreased to 100mg BID, on ; had an episode of uncontrolled HTN, concerning for HTN emergency with flash pulm edema; had increased work of breathing onf AVAPS requiring intubation and admission to the MICU      PAST MEDICAL & SURGICAL HISTORY:  Hypertension, unspecified type      Other hyperlipidemia      History of epilepsy      Paranoid schizophrenia      Obstructive sleep apnea      Testicular cancer      H/O Spinal surgery          FAMILY HISTORY:      SOCIAL HISTORY:  Smoking:   Substance Use:   EtOH Use:   Marital Status:   Sexual History:   Occupation:  Recent Travel:  Country of Birth:   Advance Directives:     HOME MEDICATIONS:      Allergies    penicillin (Hives)  seasonal allergies (Unknown)    Intolerances    latex (Rash)        REVIEW OF SYSTEMS:  [ ] Unable to assess ROS because Intubated/Sedated    OBJECTIVE:  ICU Vital Signs Last 24 Hrs  T(C): 39.2 (2025 15:00), Max: 39.3 (2025 12:23)  T(F): 102.6 (2025 15:00), Max: 102.7 (2025 12:23)  HR: 110 (2025 15:25) (90 - 147)  BP: 87/53 (2025 15:25) (87/53 - 223/139)  BP(mean): 65 (2025 15:25) (65 - 143)  ABP: --  ABP(mean): --  RR: 32 (2025 15:25) (18 - 48)  SpO2: 91% (2025 15:25) (90% - 97%)    O2 Parameters below as of 2025 14:22  Patient On (Oxygen Delivery Method): ventilator          Mode: AC/ CMV (Assist Control/ Continuous Mandatory Ventilation), RR (machine): 24, TV (machine): 450, FiO2: 100, PEEP: 5, ITime: 1, MAP: 15, PIP: 33     @ 07:01  -   @ 07:00  --------------------------------------------------------  IN: 0 mL / OUT: 1400 mL / NET: -1400 mL      CAPILLARY BLOOD GLUCOSE      POCT Blood Glucose.: 244 mg/dL (2025 13:23)      =================PHYSICAL EXAM=================    GENERAL: Laying comfortably, NAD  EYES: EOMI, PERRL  NECK: No JVD  LUNG: bilateral rales  HEART: Regular rate and rhythm;  ABDOMEN: obese body habitus, no abdominal pain  EXTREMITIES:  No LE edema, 2+ Peripheral Pulses, No clubbing, cyanosis, or edema  PSYCH: AAOx0    =================================================    LABS:                        12.0   13.21 )-----------( 135      ( 2025 15:10 )             37.8     Hgb Trend: 12.0<--, 12.4<--, 10.3<--, 10.4<--, 10.6<--      138  |  104  |  38[H]  ----------------------------<  157[H]  4.8   |  19[L]  |  2.09[H]    Ca    9.7      2025 07:38  Phos  3.5       Mg     2.2         TPro  6.8  /  Alb  3.7  /  TBili  0.7  /  DBili  x   /  AST  149[H]  /  ALT  170[H]  /  AlkPhos  194[H]      Creatinine Trend: 2.09<--, 2.08<--, 2.23<--, 2.53<--, 2.49<--, 2.57<--  PT/INR - ( 2025 15:10 )   PT: 11.6 sec;   INR: 1.02 ratio         PTT - ( 2025 15:10 )  PTT:32.1 sec  Urinalysis Basic - ( 2025 15:33 )    Color: Yellow / Appearance: Clear / S.022 / pH: x  Gluc: x / Ketone: Trace mg/dL  / Bili: Negative / Urobili: 0.2 mg/dL   Blood: x / Protein: >=1000 mg/dL / Nitrite: Negative   Leuk Esterase: Negative / RBC: 2 /HPF / WBC 2 /HPF   Sq Epi: x / Non Sq Epi: 4 /HPF / Bacteria: Negative /HPF      Arterial Blood Gas:   @ 14:55  7.30/38/85/19/95.1/-7.1  ABG lactate: --  Arterial Blood Gas:   @ 06:57  7.35/37/58/20/89.0/-4.6  ABG lactate: --      Mode: AC/ CMV (Assist Control/ Continuous Mandatory Ventilation), RR (machine): 24, TV (machine): 450, FiO2: 100, PEEP: 5, ITime: 1, MAP: 15, PIP: 33    LINES:     HOSPITAL MEDICATIONS:  MEDICATIONS  (STANDING):  albuterol/ipratropium for Nebulization 3 milliLiter(s) Nebulizer every 6 hours  amLODIPine   Tablet 10 milliGRAM(s) Oral daily  atorvastatin 20 milliGRAM(s) Oral at bedtime  bacitracin   Ointment 1 Application(s) Topical two times a day  cefepime   IVPB 2000 milliGRAM(s) IV Intermittent once  cefepime   IVPB 2000 milliGRAM(s) IV Intermittent every 12 hours  cefepime   IVPB      chlorhexidine 0.12% Liquid 15 milliLiter(s) Oral Mucosa every 12 hours  chlorhexidine 2% Cloths 1 Application(s) Topical <User Schedule>  chlorhexidine 2% Cloths 1 Application(s) Topical <User Schedule>  escitalopram 15 milliGRAM(s) Oral daily  gabapentin Solution 300 milliGRAM(s) Oral two times a day  heparin   Injectable 7500 Unit(s) SubCutaneous every 12 hours  influenza  Vaccine (HIGH DOSE) 0.5 milliLiter(s) IntraMuscular once  lacosamide IVPB 100 milliGRAM(s) IV Intermittent every 12 hours  lamoTRIgine 100 milliGRAM(s) Oral two times a day  levETIRAcetam  IVPB 1500 milliGRAM(s) IV Intermittent two times a day  lidocaine   4% Patch 1 Patch Transdermal daily  lurasidone 40 milliGRAM(s) Oral daily  norepinephrine Infusion 0.05 MICROgram(s)/kG/Min (9.68 mL/Hr) IV Continuous <Continuous>  propofol Infusion 50 MICROgram(s)/kG/Min (31 mL/Hr) IV Continuous <Continuous>  sodium chloride 3%  Inhalation 4 milliLiter(s) Inhalation every 12 hours    MEDICATIONS  (PRN):  fentaNYL    Injectable 25 MICROGram(s) IV Push every 4 hours PRN Moderate Pain (4 - 6)  nystatin Powder 1 Application(s) Topical three times a day PRN fungal infection you may see and want to treat        MICROBIOLOGY:     RADIOLOGY & ADDITIONAL TESTS:      ASSESSMENT AND PLAN:  69M w/ hx of metastatic testicular cancer (s/p L orchiectomy, on etoposide/cisplatin chemo, last dose 2024), epilepsy (grand-mal seizures), schizophrenia, developmental delay, HTN, HLD, VAZQUEZ, stage III CKD, obesity, secondary hyperparathyroidism, anemia, thrombocytopenia, ? spinal surgery who was admitted on 25 for fall and syncope. On  patient had multiple RRTs called for grand mal seizure activity and  patient had x2 more episode and was ultimately given ativan 2mg, Vimpat load 200mg, and intubated for airway protection with MICU transfer. Extubated  - to AVAPS, post extubation with increased secretion, now better; downgraded ; While on medical floors; patient developed respiratory distress; now intubated for suspected aspiration pna vs flash pulmonary edema.         ===NEURO===  #Status Epilepticus  #Grand Mal Seizures  #Schizophrenia  #Depression  - c/w AEDs: Keppra 1.5g BID, Lamotrigine 100mg BID, Gabapentin 300mg BID, Vimpat 100mg BID  - per Neuro MRI brain w and wo contrast to look for potential seizure foci that could cause increased frequency especially given hx of metastatic testicular cancer  - CT head with no brain mets  - s/p vEEG, negative for seizures  - Neuro following, appreciate recs  - c/w Escitalopram and Lurasidone  - No acute changes/concer for seizure during RRT  - Consider MRI when medically improved  - Ativan for seizures prn    ===CVS===  #HTN #HLD   - Atorvastatin 20mg qhs  -post extubation concerns for Flash pulmonary edema with SBP 180s, received Hydralazine 10 x 2  - echo with Left ventricular systolic function is normal with an ejection fraction of 62 %.There are no regional wall motion abnormalities seen. Normal right ventricular cavity size and normal right ventricular systolic function.  - Re-admitted for concern of flash Pulm Edema/aspiration causing respiratory distress  - Bumex 1mg BID; goal net negative  - BP Stable while sedated on vent    ===PULM===  #s/p Intubation extubated  to AVAPS  #S/p Intubation   #VAZQUEZ  -  CT chest with anterior bowing of the posterior tracheal wall, suggestive of tracheomalacia.  and trace right pleural effusion.  Intubated  for respiratory distress likely 2/2 flash pulm edema  - Continue diuresis with bumex 1mg BID    ===RENAL===  #Metabolic acidosis  #Lactic acidosis  #SHAGUFTA on CKD Stage III  - COntinue to trend Creatinine  - Diurese as above for pHTN  - Nephrology consulted; appreciate recs      ===GI===  #OG  - Begin Trickle Feeds  - Increase as tolerated    ===ID===  #Pneumonia  #Flu A positive  - Ceftriaxone 1g daily (-)  - Tamiflu 30mg BID (-)  - Follow cultures: BCx, Bronch (all neg to date)  - BCx/Sputum/Urine Cx  -   - Cefepime ( -     ===ENDO===  #At risk of hypoglycemia  #Hx of secondary hyperparathyroidism  - FS q6h while NPO    ===HEME/ONC===  #Metastatic testicular cancer, recent chemo 2024  #DVT ppx  - 7500U subQ heparin    ===ETHICS===  #Code Status: FULL CODE MICU Accept Note    CHIEF COMPLAINT:     HPI / INTERVAL HISTORY:  69M w/ hx of metastatic testicular cancer (s/p L orchiectomy, on etoposide/cisplatin chemo, last dose 2024), epilepsy (grand-mal seizures), schizophrenia, developmental delay, HTN, HLD, VAZQUEZ, stage III CKD, obesity, secondary hyperparathyroidism, anemia, thrombocytopenia, ? spinal surgery who was admitted on 25 for fall and syncope. On  patient had multiple RRTs called for grand mal seizure activity and  patient had x2 more episode and was ultimately given ativan 2mg, Vimpat load 200mg, and intubated for airway protection with MICU transfer. Extubated  - to AVAPS, post extubation with increased secretion, now better and Hemodynamically stable to downgrade to medicine floor with pulse oximetry.    WHile on the medical floors; Patient continued treatment for Influenza, Vimpat was decreased to 100mg BID, on ; had an episode of uncontrolled HTN, concerning for HTN emergency with flash pulm edema; had increased work of breathing onf AVAPS requiring intubation and admission to the MICU      PAST MEDICAL & SURGICAL HISTORY:  Hypertension, unspecified type      Other hyperlipidemia      History of epilepsy      Paranoid schizophrenia      Obstructive sleep apnea      Testicular cancer      H/O Spinal surgery          FAMILY HISTORY:      SOCIAL HISTORY:  Smoking:   Substance Use:   EtOH Use:   Marital Status:   Sexual History:   Occupation:  Recent Travel:  Country of Birth:   Advance Directives:     HOME MEDICATIONS:      Allergies    penicillin (Hives)  seasonal allergies (Unknown)    Intolerances    latex (Rash)        REVIEW OF SYSTEMS:  [ ] Unable to assess ROS because Intubated/Sedated    OBJECTIVE:  ICU Vital Signs Last 24 Hrs  T(C): 39.2 (2025 15:00), Max: 39.3 (2025 12:23)  T(F): 102.6 (2025 15:00), Max: 102.7 (2025 12:23)  HR: 110 (2025 15:25) (90 - 147)  BP: 87/53 (2025 15:25) (87/53 - 223/139)  BP(mean): 65 (2025 15:25) (65 - 143)  ABP: --  ABP(mean): --  RR: 32 (2025 15:25) (18 - 48)  SpO2: 91% (2025 15:25) (90% - 97%)    O2 Parameters below as of 2025 14:22  Patient On (Oxygen Delivery Method): ventilator          Mode: AC/ CMV (Assist Control/ Continuous Mandatory Ventilation), RR (machine): 24, TV (machine): 450, FiO2: 100, PEEP: 5, ITime: 1, MAP: 15, PIP: 33     @ 07:01  -   @ 07:00  --------------------------------------------------------  IN: 0 mL / OUT: 1400 mL / NET: -1400 mL      CAPILLARY BLOOD GLUCOSE      POCT Blood Glucose.: 244 mg/dL (2025 13:23)      =================PHYSICAL EXAM=================    GENERAL: Laying comfortably, NAD  EYES: EOMI, PERRL  NECK: No JVD  LUNG: bilateral rales  HEART: Regular rate and rhythm;  ABDOMEN: obese body habitus, no abdominal pain  EXTREMITIES:  No LE edema, 2+ Peripheral Pulses, No clubbing, cyanosis, or edema  PSYCH: AAOx0    =================================================    LABS:                        12.0   13.21 )-----------( 135      ( 2025 15:10 )             37.8     Hgb Trend: 12.0<--, 12.4<--, 10.3<--, 10.4<--, 10.6<--      138  |  104  |  38[H]  ----------------------------<  157[H]  4.8   |  19[L]  |  2.09[H]    Ca    9.7      2025 07:38  Phos  3.5       Mg     2.2         TPro  6.8  /  Alb  3.7  /  TBili  0.7  /  DBili  x   /  AST  149[H]  /  ALT  170[H]  /  AlkPhos  194[H]      Creatinine Trend: 2.09<--, 2.08<--, 2.23<--, 2.53<--, 2.49<--, 2.57<--  PT/INR - ( 2025 15:10 )   PT: 11.6 sec;   INR: 1.02 ratio         PTT - ( 2025 15:10 )  PTT:32.1 sec  Urinalysis Basic - ( 2025 15:33 )    Color: Yellow / Appearance: Clear / S.022 / pH: x  Gluc: x / Ketone: Trace mg/dL  / Bili: Negative / Urobili: 0.2 mg/dL   Blood: x / Protein: >=1000 mg/dL / Nitrite: Negative   Leuk Esterase: Negative / RBC: 2 /HPF / WBC 2 /HPF   Sq Epi: x / Non Sq Epi: 4 /HPF / Bacteria: Negative /HPF      Arterial Blood Gas:   @ 14:55  7.30/38/85/19/95.1/-7.1  ABG lactate: --  Arterial Blood Gas:   @ 06:57  7.35/37/58/20/89.0/-4.6  ABG lactate: --      Mode: AC/ CMV (Assist Control/ Continuous Mandatory Ventilation), RR (machine): 24, TV (machine): 450, FiO2: 100, PEEP: 5, ITime: 1, MAP: 15, PIP: 33    LINES:     HOSPITAL MEDICATIONS:  MEDICATIONS  (STANDING):  albuterol/ipratropium for Nebulization 3 milliLiter(s) Nebulizer every 6 hours  amLODIPine   Tablet 10 milliGRAM(s) Oral daily  atorvastatin 20 milliGRAM(s) Oral at bedtime  bacitracin   Ointment 1 Application(s) Topical two times a day  cefepime   IVPB 2000 milliGRAM(s) IV Intermittent once  cefepime   IVPB 2000 milliGRAM(s) IV Intermittent every 12 hours  cefepime   IVPB      chlorhexidine 0.12% Liquid 15 milliLiter(s) Oral Mucosa every 12 hours  chlorhexidine 2% Cloths 1 Application(s) Topical <User Schedule>  chlorhexidine 2% Cloths 1 Application(s) Topical <User Schedule>  escitalopram 15 milliGRAM(s) Oral daily  gabapentin Solution 300 milliGRAM(s) Oral two times a day  heparin   Injectable 7500 Unit(s) SubCutaneous every 12 hours  influenza  Vaccine (HIGH DOSE) 0.5 milliLiter(s) IntraMuscular once  lacosamide IVPB 100 milliGRAM(s) IV Intermittent every 12 hours  lamoTRIgine 100 milliGRAM(s) Oral two times a day  levETIRAcetam  IVPB 1500 milliGRAM(s) IV Intermittent two times a day  lidocaine   4% Patch 1 Patch Transdermal daily  lurasidone 40 milliGRAM(s) Oral daily  norepinephrine Infusion 0.05 MICROgram(s)/kG/Min (9.68 mL/Hr) IV Continuous <Continuous>  propofol Infusion 50 MICROgram(s)/kG/Min (31 mL/Hr) IV Continuous <Continuous>  sodium chloride 3%  Inhalation 4 milliLiter(s) Inhalation every 12 hours    MEDICATIONS  (PRN):  fentaNYL    Injectable 25 MICROGram(s) IV Push every 4 hours PRN Moderate Pain (4 - 6)  nystatin Powder 1 Application(s) Topical three times a day PRN fungal infection you may see and want to treat        MICROBIOLOGY:     RADIOLOGY & ADDITIONAL TESTS:      ASSESSMENT AND PLAN:  69M w/ hx of metastatic testicular cancer (s/p L orchiectomy, on etoposide/cisplatin chemo, last dose 2024), epilepsy (grand-mal seizures), schizophrenia, developmental delay, HTN, HLD, VAZQUEZ, stage III CKD, obesity, secondary hyperparathyroidism, anemia, thrombocytopenia, ? spinal surgery who was admitted on 25 for fall and syncope. On  patient had multiple RRTs called for grand mal seizure activity and  patient had x2 more episode and was ultimately given ativan 2mg, Vimpat load 200mg, and intubated for airway protection with MICU transfer. Extubated  - to AVAPS, post extubation with increased secretion, now better; downgraded ; While on medical floors; patient developed respiratory distress; now intubated for suspected aspiration pna vs flash pulmonary edema.         ===NEURO===  #Status Epilepticus  #Grand Mal Seizures  #Schizophrenia  #Depression  - c/w AEDs: Keppra 1.5g BID, Lamotrigine 100mg BID, Gabapentin 300mg BID, Vimpat 100mg BID  - per Neuro MRI brain w and wo contrast to look for potential seizure foci that could cause increased frequency especially given hx of metastatic testicular cancer  - CT head with no brain mets  - s/p vEEG, negative for seizures  - Neuro following, appreciate recs  - c/w Escitalopram and Lurasidone  - No acute changes/concer for seizure during RRT  - Consider MRI when medically improved  - Ativan for seizures prn    ===CVS===  #HTN #HLD   - Atorvastatin 20mg qhs  -post extubation concerns for Flash pulmonary edema with SBP 180s, received Hydralazine 10 x 2  - echo with Left ventricular systolic function is normal with an ejection fraction of 62 %.There are no regional wall motion abnormalities seen. Normal right ventricular cavity size and normal right ventricular systolic function.  - Re-admitted for concern of flash Pulm Edema/aspiration causing respiratory distress  - Bumex 1mg BID; goal net negative  - BP Stable while sedated on vent    ===PULM===  #s/p Intubation extubated  to AVAPS  #S/p Intubation   #VAZQUEZ  -  CT chest with anterior bowing of the posterior tracheal wall, suggestive of tracheomalacia.  and trace right pleural effusion.  Intubated  for respiratory distress likely 2/2 flash pulm edema  - Continue diuresis with bumex 1mg BID    ===RENAL===  #Metabolic acidosis  #Lactic acidosis  #SHAGUFTA on CKD Stage III  - COntinue to trend Creatinine  - Diurese as above for pHTN  - Nephrology consulted; appreciate recs      ===GI===  #OG  - Begin Trickle Feeds  - Increase as tolerated    ===ID===  #Pneumonia  #Flu A positive  - Ceftriaxone 1g daily (-)  - Tamiflu 30mg BID (-)  - Follow cultures: BCx, Bronch (all neg to date)  - BCx/Sputum/Urine Cx  -   - Cefepime ( -     ===ENDO===  #At risk of hypoglycemia  #Hx of secondary hyperparathyroidism  - FS q6h while NPO    ===HEME/ONC===  #Metastatic testicular cancer, recent chemo 2024  #DVT ppx  - 7500U subQ heparin    ===ETHICS===  #Code Status: FULL CODE        Attending Attestation:  Patient seen and examined. Patient is a 69M with extensive history including metastatic testicular cancer (last chemo 2024), developmental delay, seizure disorder on AED, schizophrenia, HTN, HLD, CKD3, obesity, and VAZQUEZ on CPAP (unknown settings) presenting initially with fall and seizures. After admission he had RRT for prolonged seizure episode requiring intubation with subsequent extubation and transfer to the medical floors. Over the last 24 hours the patient was noted to have fevers and increasing tachypnea and respiratory distress. He was intubated during an RRT  and returned to MICU for further care.      #Neuro - continue AED as per neuro - adust as needed based on PO vs IV needs  - spinal fixation hardware noted on imaging  - Patient sedated for vent synchrony - minimize sedation as able  #Cardiovascular - hypertensive with initial concern for flash pulmonary edema during RRT - given Bumex 4mg x 2  - POCUS done with grossly normal cardiac function. Check pro-BNP  #Pulmonary - acute hypoxemic respiratory failure in the setting of possible aspiration with fevers and tachypnea vs flash pulmonary edema  - Initially with IInfluenza A and seizure disorder with need for airway protection  - Extubated  and was on nocturnal AVAPs but required reintubation   - Continue airway clearance and monitoring suctioning requirements   - Check sputum culture  - Aspiration precautions  - Maintain O2 sat > 90%  - VAZQUEZ - reportedly on CPAP at home. Resume AVAPs when/if extubated. Prior to discharge will need to either have patient set up with a new machine for new settings  #Gastro - oropharyngeal dysphagia  - Will need OGT for feeds and medications  - Protonix  #Nephro - CKD3  - Monitor urine output   - Creatinine stable - Nephrology evaluation consulted from floors - will follow-up recommendations  #Infectious Disease - RLL consolidation previously now improved on POCUS  - Restart broad spectrum antibiotics given fevers and check cultures - blood, sputum, urine  - Complete course of Tamiflu  - Completed course of Ceftriaxone for CAP initially  - ID follow-up  #Psych - continue home medications  #Onc - Metastatic testicular cancer  - CTH without evidence of brain mets  #DVT proph - HSQ  #GOC - Full Code  - Patient has family involved  - Reportedly not under the auspices of [facility] - this was previously evaluated by SW team  - Palliative care evaluation given 2nd intubation    Long term prognosis guarded.    I have provided 50 minutes of critical care time independent of procedures or teaching services. Patient is critically ill requiring ICU level of care.

## 2025-01-23 NOTE — AIRWAY PLACEMENT NOTE ADULT - INDICATIONS:
Route for mechanical ventilation
Route for mechanical ventilation
Route for mechanical ventilation/Respiratory distress
Route for mechanical ventilation/Respiratory distress

## 2025-01-23 NOTE — PROVIDER CONTACT NOTE (OTHER) - BACKGROUND
Pt was admitted for syncope.
Pt was admitted for syncope.
Pt admitted for syncope and collapse, pt has hx of testicular CA, HTN VAZQUEZ, schizophrenia, HLD

## 2025-01-24 LAB
ALBUMIN SERPL ELPH-MCNC: 3 G/DL — LOW (ref 3.3–5)
ALBUMIN SERPL ELPH-MCNC: 3 G/DL — LOW (ref 3.3–5)
ALBUMIN SERPL ELPH-MCNC: 3.1 G/DL — LOW (ref 3.3–5)
ALP SERPL-CCNC: 129 U/L — HIGH (ref 40–120)
ALP SERPL-CCNC: 132 U/L — HIGH (ref 40–120)
ALP SERPL-CCNC: 140 U/L — HIGH (ref 40–120)
ALT FLD-CCNC: 111 U/L — HIGH (ref 10–45)
ALT FLD-CCNC: 113 U/L — HIGH (ref 10–45)
ALT FLD-CCNC: 131 U/L — HIGH (ref 10–45)
ANION GAP SERPL CALC-SCNC: 16 MMOL/L — SIGNIFICANT CHANGE UP (ref 5–17)
ANION GAP SERPL CALC-SCNC: 18 MMOL/L — HIGH (ref 5–17)
ANION GAP SERPL CALC-SCNC: 20 MMOL/L — HIGH (ref 5–17)
APTT BLD: 31.6 SEC — SIGNIFICANT CHANGE UP (ref 24.5–35.6)
AST SERPL-CCNC: 71 U/L — HIGH (ref 10–40)
AST SERPL-CCNC: 78 U/L — HIGH (ref 10–40)
AST SERPL-CCNC: 86 U/L — HIGH (ref 10–40)
BASOPHILS # BLD AUTO: 0.04 K/UL — SIGNIFICANT CHANGE UP (ref 0–0.2)
BASOPHILS NFR BLD AUTO: 0.2 % — SIGNIFICANT CHANGE UP (ref 0–2)
BILIRUB SERPL-MCNC: 0.6 MG/DL — SIGNIFICANT CHANGE UP (ref 0.2–1.2)
BUN SERPL-MCNC: 50 MG/DL — HIGH (ref 7–23)
BUN SERPL-MCNC: 62 MG/DL — HIGH (ref 7–23)
BUN SERPL-MCNC: 64 MG/DL — HIGH (ref 7–23)
CALCIUM SERPL-MCNC: 9.2 MG/DL — SIGNIFICANT CHANGE UP (ref 8.4–10.5)
CALCIUM SERPL-MCNC: 9.3 MG/DL — SIGNIFICANT CHANGE UP (ref 8.4–10.5)
CALCIUM SERPL-MCNC: 9.3 MG/DL — SIGNIFICANT CHANGE UP (ref 8.4–10.5)
CHLORIDE SERPL-SCNC: 100 MMOL/L — SIGNIFICANT CHANGE UP (ref 96–108)
CHLORIDE SERPL-SCNC: 102 MMOL/L — SIGNIFICANT CHANGE UP (ref 96–108)
CHLORIDE SERPL-SCNC: 105 MMOL/L — SIGNIFICANT CHANGE UP (ref 96–108)
CO2 SERPL-SCNC: 17 MMOL/L — LOW (ref 22–31)
CO2 SERPL-SCNC: 18 MMOL/L — LOW (ref 22–31)
CO2 SERPL-SCNC: 18 MMOL/L — LOW (ref 22–31)
CREAT SERPL-MCNC: 2.95 MG/DL — HIGH (ref 0.5–1.3)
CREAT SERPL-MCNC: 3.61 MG/DL — HIGH (ref 0.5–1.3)
CREAT SERPL-MCNC: 3.92 MG/DL — HIGH (ref 0.5–1.3)
EGFR: 16 ML/MIN/1.73M2 — LOW
EGFR: 18 ML/MIN/1.73M2 — LOW
EGFR: 23 ML/MIN/1.73M2 — LOW
EOSINOPHIL # BLD AUTO: 0 K/UL — SIGNIFICANT CHANGE UP (ref 0–0.5)
EOSINOPHIL NFR BLD AUTO: 0 % — SIGNIFICANT CHANGE UP (ref 0–6)
GLUCOSE SERPL-MCNC: 138 MG/DL — HIGH (ref 70–99)
GLUCOSE SERPL-MCNC: 141 MG/DL — HIGH (ref 70–99)
GLUCOSE SERPL-MCNC: 173 MG/DL — HIGH (ref 70–99)
HCT VFR BLD CALC: 34.9 % — LOW (ref 39–50)
HGB BLD-MCNC: 11.2 G/DL — LOW (ref 13–17)
IMM GRANULOCYTES NFR BLD AUTO: 0.5 % — SIGNIFICANT CHANGE UP (ref 0–0.9)
INR BLD: 1.02 RATIO — SIGNIFICANT CHANGE UP (ref 0.85–1.16)
LYMPHOCYTES # BLD AUTO: 0.78 K/UL — LOW (ref 1–3.3)
LYMPHOCYTES # BLD AUTO: 4.8 % — LOW (ref 13–44)
MAGNESIUM SERPL-MCNC: 2.4 MG/DL — SIGNIFICANT CHANGE UP (ref 1.6–2.6)
MAGNESIUM SERPL-MCNC: 2.5 MG/DL — SIGNIFICANT CHANGE UP (ref 1.6–2.6)
MAGNESIUM SERPL-MCNC: 2.5 MG/DL — SIGNIFICANT CHANGE UP (ref 1.6–2.6)
MCHC RBC-ENTMCNC: 32 PG — SIGNIFICANT CHANGE UP (ref 27–34)
MCHC RBC-ENTMCNC: 32.1 G/DL — SIGNIFICANT CHANGE UP (ref 32–36)
MCV RBC AUTO: 99.7 FL — SIGNIFICANT CHANGE UP (ref 80–100)
MONOCYTES # BLD AUTO: 0.73 K/UL — SIGNIFICANT CHANGE UP (ref 0–0.9)
MONOCYTES NFR BLD AUTO: 4.5 % — SIGNIFICANT CHANGE UP (ref 2–14)
MRSA PCR RESULT.: SIGNIFICANT CHANGE UP
NEUTROPHILS # BLD AUTO: 14.7 K/UL — HIGH (ref 1.8–7.4)
NEUTROPHILS NFR BLD AUTO: 90 % — HIGH (ref 43–77)
NRBC # BLD: 0 /100 WBCS — SIGNIFICANT CHANGE UP (ref 0–0)
NRBC BLD-RTO: 0 /100 WBCS — SIGNIFICANT CHANGE UP (ref 0–0)
PHOSPHATE SERPL-MCNC: 5.5 MG/DL — HIGH (ref 2.5–4.5)
PHOSPHATE SERPL-MCNC: 6.7 MG/DL — HIGH (ref 2.5–4.5)
PHOSPHATE SERPL-MCNC: 6.9 MG/DL — HIGH (ref 2.5–4.5)
PLATELET # BLD AUTO: 109 K/UL — LOW (ref 150–400)
POTASSIUM SERPL-MCNC: 3.9 MMOL/L — SIGNIFICANT CHANGE UP (ref 3.5–5.3)
POTASSIUM SERPL-MCNC: 4.2 MMOL/L — SIGNIFICANT CHANGE UP (ref 3.5–5.3)
POTASSIUM SERPL-MCNC: 4.8 MMOL/L — SIGNIFICANT CHANGE UP (ref 3.5–5.3)
POTASSIUM SERPL-SCNC: 3.9 MMOL/L — SIGNIFICANT CHANGE UP (ref 3.5–5.3)
POTASSIUM SERPL-SCNC: 4.2 MMOL/L — SIGNIFICANT CHANGE UP (ref 3.5–5.3)
POTASSIUM SERPL-SCNC: 4.8 MMOL/L — SIGNIFICANT CHANGE UP (ref 3.5–5.3)
PROT SERPL-MCNC: 6.2 G/DL — SIGNIFICANT CHANGE UP (ref 6–8.3)
PROT SERPL-MCNC: 6.3 G/DL — SIGNIFICANT CHANGE UP (ref 6–8.3)
PROT SERPL-MCNC: 6.5 G/DL — SIGNIFICANT CHANGE UP (ref 6–8.3)
PROTHROM AB SERPL-ACNC: 11.7 SEC — SIGNIFICANT CHANGE UP (ref 9.9–13.4)
RBC # BLD: 3.5 M/UL — LOW (ref 4.2–5.8)
RBC # FLD: 13.1 % — SIGNIFICANT CHANGE UP (ref 10.3–14.5)
S AUREUS DNA NOSE QL NAA+PROBE: SIGNIFICANT CHANGE UP
SODIUM SERPL-SCNC: 136 MMOL/L — SIGNIFICANT CHANGE UP (ref 135–145)
SODIUM SERPL-SCNC: 138 MMOL/L — SIGNIFICANT CHANGE UP (ref 135–145)
SODIUM SERPL-SCNC: 140 MMOL/L — SIGNIFICANT CHANGE UP (ref 135–145)
WBC # BLD: 16.33 K/UL — HIGH (ref 3.8–10.5)
WBC # FLD AUTO: 16.33 K/UL — HIGH (ref 3.8–10.5)

## 2025-01-24 PROCEDURE — 72148 MRI LUMBAR SPINE W/O DYE: CPT | Mod: 26

## 2025-01-24 PROCEDURE — 76536 US EXAM OF HEAD AND NECK: CPT | Mod: 26

## 2025-01-24 PROCEDURE — 99291 CRITICAL CARE FIRST HOUR: CPT

## 2025-01-24 RX ORDER — PIPERACILLIN SODIUM AND TAZOBACTAM SODIUM 2; 250 G/50ML; MG/50ML
3.38 INJECTION, POWDER, FOR SOLUTION INTRAVENOUS EVERY 12 HOURS
Refills: 0 | Status: DISCONTINUED | OUTPATIENT
Start: 2025-01-24 | End: 2025-01-30

## 2025-01-24 RX ORDER — PANTOPRAZOLE 20 MG/1
40 TABLET, DELAYED RELEASE ORAL DAILY
Refills: 0 | Status: DISCONTINUED | OUTPATIENT
Start: 2025-01-24 | End: 2025-02-06

## 2025-01-24 RX ORDER — CHLOROTHIAZIDE 250 MG
500 TABLET ORAL ONCE
Refills: 0 | Status: COMPLETED | OUTPATIENT
Start: 2025-01-24 | End: 2025-01-24

## 2025-01-24 RX ORDER — BUMETANIDE 2 MG/1
2 TABLET ORAL
Qty: 20 | Refills: 0 | Status: DISCONTINUED | OUTPATIENT
Start: 2025-01-24 | End: 2025-01-25

## 2025-01-24 RX ORDER — BUMETANIDE 2 MG/1
4 TABLET ORAL ONCE
Refills: 0 | Status: COMPLETED | OUTPATIENT
Start: 2025-01-24 | End: 2025-01-24

## 2025-01-24 RX ADMIN — BUMETANIDE 20 MG/HR: 2 TABLET ORAL at 17:56

## 2025-01-24 RX ADMIN — BUMETANIDE 20 MG/HR: 2 TABLET ORAL at 02:47

## 2025-01-24 RX ADMIN — IPRATROPIUM BROMIDE AND ALBUTEROL SULFATE 3 MILLILITER(S): .5; 2.5 SOLUTION RESPIRATORY (INHALATION) at 12:03

## 2025-01-24 RX ADMIN — GABAPENTIN 300 MILLIGRAM(S): 800 TABLET ORAL at 17:56

## 2025-01-24 RX ADMIN — PIPERACILLIN SODIUM AND TAZOBACTAM SODIUM 25 GRAM(S): 2; 250 INJECTION, POWDER, FOR SOLUTION INTRAVENOUS at 05:24

## 2025-01-24 RX ADMIN — Medication 4 MILLILITER(S): at 17:36

## 2025-01-24 RX ADMIN — Medication 7500 UNIT(S): at 05:25

## 2025-01-24 RX ADMIN — Medication 7500 UNIT(S): at 17:56

## 2025-01-24 RX ADMIN — GABAPENTIN 300 MILLIGRAM(S): 800 TABLET ORAL at 05:25

## 2025-01-24 RX ADMIN — ANTISEPTIC SURGICAL SCRUB 15 MILLILITER(S): 0.04 SOLUTION TOPICAL at 17:55

## 2025-01-24 RX ADMIN — PIPERACILLIN SODIUM AND TAZOBACTAM SODIUM 25 GRAM(S): 2; 250 INJECTION, POWDER, FOR SOLUTION INTRAVENOUS at 13:33

## 2025-01-24 RX ADMIN — ANTISEPTIC SURGICAL SCRUB 1 APPLICATION(S): 0.04 SOLUTION TOPICAL at 05:26

## 2025-01-24 RX ADMIN — LEVETIRACETAM 400 MILLIGRAM(S): 750 TABLET, FILM COATED ORAL at 05:24

## 2025-01-24 RX ADMIN — LACOSAMIDE 120 MILLIGRAM(S): 200 TABLET, FILM COATED ORAL at 17:56

## 2025-01-24 RX ADMIN — LAMOTRIGINE 100 MILLIGRAM(S): 100 TABLET ORAL at 05:25

## 2025-01-24 RX ADMIN — LAMOTRIGINE 100 MILLIGRAM(S): 100 TABLET ORAL at 17:56

## 2025-01-24 RX ADMIN — PANTOPRAZOLE 40 MILLIGRAM(S): 20 TABLET, DELAYED RELEASE ORAL at 11:20

## 2025-01-24 RX ADMIN — ANTISEPTIC SURGICAL SCRUB 15 MILLILITER(S): 0.04 SOLUTION TOPICAL at 05:24

## 2025-01-24 RX ADMIN — BUMETANIDE 132 MILLIGRAM(S): 2 TABLET ORAL at 02:11

## 2025-01-24 RX ADMIN — LURASIDONE HYDROCHLORIDE 40 MILLIGRAM(S): 80 TABLET, FILM COATED ORAL at 11:19

## 2025-01-24 RX ADMIN — IPRATROPIUM BROMIDE AND ALBUTEROL SULFATE 3 MILLILITER(S): .5; 2.5 SOLUTION RESPIRATORY (INHALATION) at 05:56

## 2025-01-24 RX ADMIN — POLYETHYLENE GLYCOL 3350 17 GRAM(S): 17 POWDER, FOR SOLUTION ORAL at 11:20

## 2025-01-24 RX ADMIN — Medication 100 MILLIGRAM(S): at 02:11

## 2025-01-24 RX ADMIN — ESCITALOPRAM 15 MILLIGRAM(S): 10 TABLET, FILM COATED ORAL at 11:35

## 2025-01-24 RX ADMIN — LEVETIRACETAM 400 MILLIGRAM(S): 750 TABLET, FILM COATED ORAL at 17:55

## 2025-01-24 RX ADMIN — Medication 4 MILLILITER(S): at 05:56

## 2025-01-24 RX ADMIN — Medication 1 APPLICATION(S): at 17:55

## 2025-01-24 RX ADMIN — LACOSAMIDE 120 MILLIGRAM(S): 200 TABLET, FILM COATED ORAL at 05:26

## 2025-01-24 RX ADMIN — Medication 1 APPLICATION(S): at 05:37

## 2025-01-24 RX ADMIN — IPRATROPIUM BROMIDE AND ALBUTEROL SULFATE 3 MILLILITER(S): .5; 2.5 SOLUTION RESPIRATORY (INHALATION) at 17:37

## 2025-01-24 NOTE — CHART NOTE - NSCHARTNOTEFT_GEN_A_CORE
NUTRITION FOLLOW UP NOTE     PATIENT SEEN FOR: Follow Up     SOURCE: [] Patient  [x] Current Medical Record  [] RN  [] Family/support person at bedside  [X] Patient unavailable/inappropriate  [X] Other: Interdisciplinary rounds.    CHART REVIEWED/EVENTS NOTED.  [] No changes to nutrition care plan to note  [X] Nutrition Status:  - Seizure Flu   - Extubated   -  Transferred from MICU to Medicine on    - Seen by SLP  and performed FEES study, recommend " soft bite sized/mildly thick liquids"   -  2 Rapid responses, intubated, sedated and mechanically ventilated.   -  Transferred to MICU     DIET ORDER:   Diet, NPO with Tube Feed:   Tube Feeding Modality: Orogastric  Glucerna 1.2 Garth (GLUCERNARTH)  Total Volume for 24 Hours (mL): 360  Continuous  Starting Tube Feed Rate {mL per Hour}: 10  Increase Tube Feed Rate by (mL): 10     Every 4 hours  Until Goal Tube Feed Rate (mL per Hour): 20  Tube Feed Duration (in Hours): 18  Tube Feed Start Time: 11:00  Tube Feed Stop Time: 05:00 (25)      CURRENT DIET ORDER IS:  [] Appropriate:  [X] Inadequate:  [X] Other: See below for recommendations     NUTRITION INTAKE/PROVISION:  [] PO:  [X] Enteral Nutrition:   Diet History   - NPO   - Receiving Nepro Carb steady at   - NPO   - Soft and bite sized diet   - Trickle feeds of glucerna 1.2 at 10ml/hour     EN Order Nepro Carb Steady ( -) Provided:  720 ml formula, 1,296 kcal, 58 g protein, 523 ml free water; meets 12 kcal/kg (based on admission weight of 103.2kg) and 0.9 g protein/kg (based on IBW of 64.5kg)    EN provision: 50% EN kcal goal provided for 2 days (-)     EN Order Glucerna 1.2 (-) Provided:  360 ml formula, 432 kcal, 21 g protein, 289 ml free water;   Patient is also receiving Propofol at 21.7 ml/hour providing 572.8 kcals   Total order is providing 1004 kcal and 21 g protein, meets 9.7 kcal/kg (using dosing weight of 103.2kg) and 0.32 g protein/kg (based on IBW of 64.5kg   Diet for past 9 days is not meeting patient's estimated needs, suspected overall intake of <75% over past 9 days.     [] Parenteral Nutrition:    ANTHROPOMETRICS:  Drug Dosing Weight  Height (cm): 167.6 (2025 21:02)  Weight (kg): 103.2 (2025 21:02)  BMI (kg/m2): 36.7 (2025 21:02)  Daily Weight in k.3 (-), Weight in k.2 (-), Admission weight 103.2 kg ()   - Weight fluctuations likely in setting of +1 generalized edema and fluid shifts. RD will continue to monitor weights.     MEDICATIONS  (STANDING):  albuterol/ipratropium for Nebulization 3 milliLiter(s) Nebulizer every 6 hours  atorvastatin 20 milliGRAM(s) Oral at bedtime  bacitracin   Ointment 1 Application(s) Topical two times a day  buMETAnide Infusion 4 mG/Hr (20 mL/Hr) IV Continuous <Continuous>  chlorhexidine 0.12% Liquid 15 milliLiter(s) Oral Mucosa every 12 hours  chlorhexidine 2% Cloths 1 Application(s) Topical <User Schedule>  chlorhexidine 2% Cloths 1 Application(s) Topical <User Schedule>  dexMEDEtomidine Infusion 0.5 MICROgram(s)/kG/Hr (12.9 mL/Hr) IV Continuous <Continuous>  escitalopram 15 milliGRAM(s) Oral daily  gabapentin Solution 300 milliGRAM(s) Oral two times a day  heparin   Injectable 7500 Unit(s) SubCutaneous every 12 hours  influenza  Vaccine (HIGH DOSE) 0.5 milliLiter(s) IntraMuscular once  lacosamide IVPB 100 milliGRAM(s) IV Intermittent every 12 hours  lamoTRIgine 100 milliGRAM(s) Oral two times a day  levETIRAcetam  IVPB 1500 milliGRAM(s) IV Intermittent two times a day  lidocaine   4% Patch 1 Patch Transdermal daily  lurasidone 40 milliGRAM(s) Oral daily  norepinephrine Infusion 0.05 MICROgram(s)/kG/Min (9.68 mL/Hr) IV Continuous <Continuous>  piperacillin/tazobactam IVPB.. 3.375 Gram(s) IV Intermittent every 8 hours  polyethylene glycol 3350 17 Gram(s) Oral daily  propofol Infusion 50 MICROgram(s)/kG/Min (31 mL/Hr) IV Continuous <Continuous>  senna Syrup 10 milliLiter(s) Oral at bedtime  sodium chloride 3%  Inhalation 4 milliLiter(s) Inhalation every 12 hours    MEDICATIONS  (PRN):  fentaNYL    Injectable 25 MICROGram(s) IV Push every 4 hours PRN Moderate Pain (4 - 6)  nystatin Powder 1 Application(s) Topical three times a day PRN fungal infection you may see and want to treat      NUTRITIONALLY PERTINENT LABS:   Na138 mmol/L Glu 173 mg/dL[H] K+ 4.8 mmol/L Cr  2.95 mg/dL[H] BUN 50 mg/dL[H]  Phos 5.5 mg/dL[H]  Alb 3.1 g/dL[L]  U/L[H] AST 86 U/L[H] Alkaline Phosphatase 140 U/L[H]    NUTRITIONALLY PERTINENT MEDICATIONS/LABS:  [x] Reviewed  [X] Relevant notes on medications/labs:  - Ordered for norepinephrine infusion, last dose provided 0ml/hour, 0mc/kg/min- titrating down.   - Ordered for propofol at 31 ml/hour providing 818 kcals   - Ordered for Bumex, Precedex, Miralax, and senna   - BUN 50 (H), Creatinine 2.95 (H), eGFR 23 (L), Phosphorus 5.5 (H).     EDEMA:  [x] Reviewed  [X] Relevant notes: +1 Generalized edema     GI/ I&O:  [x] Reviewed  [X] Relevant notes: last BM    [] Other:    SKIN:   [X] No pressure injuries documented, per nursing flowsheet  [] Pressure injury previously noted  [] Change in pressure injury documentation:  [] Other:    ESTIMATED NEEDS:  [] No change:  [X] Updated:  Energy: 6272-3605  kcal/day (18-22 kcal/kg)- based on dosing weight of 103.2 kg to account for fluid shifts, edema and BMI> 30  Protein:  96.7-129 g/day (1.5-2.0 g/kg)- based on IBW of 64.5kg to account for BMI > 30   Defer fluids to team   Bob state equation 2163 kcal/day       NUTRITION DIAGNOSIS:  [X] Prior Dx: Inadequate protein energy intake   [] New Dx:    EDUCATION:  [] Yes:  [X] Not appropriate/warranted    NUTRITION CARE PLAN:  1. Diet: If propofol continues at 21.7 ml/hour providing 572 kcals. Recommend Vital High protein starting at 10 ml hour and increasing by 5 ml every 6 hours until a goal rate of 70ml/hour x 18 hours is met. Goal rate provides 1,260 ml total volume, 1,260 kcals, 109 g protein and 1053 ml free water. Enteral nutrition order with propofol provides 1832 kcal (18 kcal/kg) and 109 g protein ( 1.6 g/kg).   - If propofol discontinued recommend Vital 1.2 at 85 ml /hour x 18 hours, goal rate provides 1530 ml total volume 1,836 kcal (18 kcal/kg), 114 g protein (1.7 g/kg) and 1240 ml free water.   2. Multivitamin/mineral supplementation: Recommend multivitamin     [] Achieved - Continue current nutrition intervention(s)  [] Current medical condition precludes nutrition intervention at this time.    MONITORING AND EVALUATION:   RD remains available upon request and will follow up per protocol.    Holly Sam MS, Dietetic Intern ([employer])   Mindy Wilson RDN CDN ([employer]) NUTRITION FOLLOW UP NOTE     PATIENT SEEN FOR: Follow Up     SOURCE: [] Patient  [x] Current Medical Record  [] RN  [] Family/support person at bedside  [X] Patient unavailable/inappropriate  [X] Other: Interdisciplinary rounds.    CHART REVIEWED/EVENTS NOTED.  [] No changes to nutrition care plan to note  [X] Nutrition Status:  - Seizure   - Flu   - Extubated   -  Transferred from MICU to Medicine on    - Seen by SLP  and performed FEES study, recommend " soft bite sized/mildly thick liquids"   -  2 Rapid responses, intubated, sedated and mechanically ventilated.   -  Transferred to MICU     DIET ORDER:   Diet, NPO with Tube Feed:   Tube Feeding Modality: Orogastric  Glucerna 1.2 Garth (GLUCERNARTH)  Total Volume for 24 Hours (mL): 360  Continuous  Starting Tube Feed Rate {mL per Hour}: 10  Increase Tube Feed Rate by (mL): 10     Every 4 hours  Until Goal Tube Feed Rate (mL per Hour): 20  Tube Feed Duration (in Hours): 18  Tube Feed Start Time: 11:00  Tube Feed Stop Time: 05:00 (25)      CURRENT DIET ORDER IS:  [] Appropriate:  [X] Inadequate:  [X] Other: See below for recommendations     NUTRITION INTAKE/PROVISION:  [] PO:  [X] Enteral Nutrition:   Diet History   - NPO   - Receiving Nepro Carb steady at   - NPO   - Soft and bite sized diet   - Trickle feeds of glucerna 1.2 at 10ml/hour     EN Order Nepro Carb Steady ( -) Provided:  720 ml formula, 1,296 kcal, 58 g protein, 523 ml free water; meets 12 kcal/kg (based on admission weight of 103.2kg) and 0.9 g protein/kg (based on IBW of 64.5kg)    EN provision: 50% EN kcal goal provided for 2 days (-)     EN Order Glucerna 1.2 (-) Provided:  360 ml formula, 432 kcal, 21 g protein, 289 ml free water;   Patient is also receiving Propofol at 21.7 ml/hour providing 572.8 kcals   Total order is providing 1004 kcal and 21 g protein, meets 9.7 kcal/kg (using dosing weight of 103.2kg) and 0.32 g protein/kg (based on IBW of 64.5kg   Diet for past 9 days is not meeting patient's estimated needs, suspected overall intake of <75% over past 9 days.     [] Parenteral Nutrition:    ANTHROPOMETRICS:  Drug Dosing Weight  Height (cm): 167.6 (2025 21:02)  Weight (kg): 103.2 (2025 21:02)  BMI (kg/m2): 36.7 (2025 21:02)  Daily Weight in k.3 (-), Weight in k.2 (-), Admission weight 103.2 kg ()   - Weight fluctuations likely in setting of +1 generalized edema and fluid shifts. RD will continue to monitor weights.     MEDICATIONS  (STANDING):  albuterol/ipratropium for Nebulization 3 milliLiter(s) Nebulizer every 6 hours  atorvastatin 20 milliGRAM(s) Oral at bedtime  bacitracin   Ointment 1 Application(s) Topical two times a day  buMETAnide Infusion 4 mG/Hr (20 mL/Hr) IV Continuous <Continuous>  chlorhexidine 0.12% Liquid 15 milliLiter(s) Oral Mucosa every 12 hours  chlorhexidine 2% Cloths 1 Application(s) Topical <User Schedule>  chlorhexidine 2% Cloths 1 Application(s) Topical <User Schedule>  dexMEDEtomidine Infusion 0.5 MICROgram(s)/kG/Hr (12.9 mL/Hr) IV Continuous <Continuous>  escitalopram 15 milliGRAM(s) Oral daily  gabapentin Solution 300 milliGRAM(s) Oral two times a day  heparin   Injectable 7500 Unit(s) SubCutaneous every 12 hours  influenza  Vaccine (HIGH DOSE) 0.5 milliLiter(s) IntraMuscular once  lacosamide IVPB 100 milliGRAM(s) IV Intermittent every 12 hours  lamoTRIgine 100 milliGRAM(s) Oral two times a day  levETIRAcetam  IVPB 1500 milliGRAM(s) IV Intermittent two times a day  lidocaine   4% Patch 1 Patch Transdermal daily  lurasidone 40 milliGRAM(s) Oral daily  norepinephrine Infusion 0.05 MICROgram(s)/kG/Min (9.68 mL/Hr) IV Continuous <Continuous>  piperacillin/tazobactam IVPB.. 3.375 Gram(s) IV Intermittent every 8 hours  polyethylene glycol 3350 17 Gram(s) Oral daily  propofol Infusion 50 MICROgram(s)/kG/Min (31 mL/Hr) IV Continuous <Continuous>  senna Syrup 10 milliLiter(s) Oral at bedtime  sodium chloride 3%  Inhalation 4 milliLiter(s) Inhalation every 12 hours    MEDICATIONS  (PRN):  fentaNYL    Injectable 25 MICROGram(s) IV Push every 4 hours PRN Moderate Pain (4 - 6)  nystatin Powder 1 Application(s) Topical three times a day PRN fungal infection you may see and want to treat      NUTRITIONALLY PERTINENT LABS:   Na138 mmol/L Glu 173 mg/dL[H] K+ 4.8 mmol/L Cr  2.95 mg/dL[H] BUN 50 mg/dL[H]  Phos 5.5 mg/dL[H]  Alb 3.1 g/dL[L]  U/L[H] AST 86 U/L[H] Alkaline Phosphatase 140 U/L[H]    NUTRITIONALLY PERTINENT MEDICATIONS/LABS:  [x] Reviewed  [X] Relevant notes on medications/labs:  - Ordered for norepinephrine infusion, last dose provided 0ml/hour, 0mc/kg/min- titrating down.  - Ordered for Bumex, Precedex, Miralax, and senna   - BUN 50 (H), Creatinine 2.95 (H), eGFR 23 (L), Phosphorus 5.5 (H).     EDEMA:  [x] Reviewed  [X] Relevant notes: +1 Generalized edema per flowsheets.     GI/ I&O:  [x] Reviewed  [X] Relevant notes: last BM  per flowsheets.   [] Other:    SKIN:   [X] No pressure injuries documented, per nursing flowsheet  [] Pressure injury previously noted  [] Change in pressure injury documentation:  [] Other:    ESTIMATED NEEDS:  [] No change:  [X] Updated:  Energy: 4137-7445  kcal/day (18-22 kcal/kg)- based on dosing weight of 103.2 kg to account for fluid shifts, edema and BMI> 30  Protein:  96.7-129 g/day (1.5-2.0 g/kg)- based on IBW of 64.5kg to account for BMI > 30   Defer fluids to team   Bob state equation 2163 kcal/day       NUTRITION DIAGNOSIS:  [X] Prior Dx: Inadequate protein energy intake   [] New Dx:    EDUCATION:  [] Yes:  [X] Not appropriate/warranted    NUTRITION CARE PLAN:  1. Diet: If propofol continues at 21.7 ml/hour providing 572 kcals. Recommend Vital High protein starting at 10 ml hour and increasing by 5 ml every 6 hours until a goal rate of 70ml/hour x 18 hours is met. Goal rate provides 1,260 ml total volume, 1,260 kcals, 109 g protein and 1053 ml free water. Enteral nutrition order with propofol provides 1832 kcal (18 kcal/kg) and 109 g protein ( 1.6 g/kg).   - If propofol discontinued recommend Vital 1.2 at 85 ml /hour x 18 hours, goal rate provides 1530 ml total volume 1,836 kcal (18 kcal/kg), 114 g protein (1.7 g/kg) and 1240 ml free water.   2. Multivitamin/mineral supplementation: Recommend multivitamin     [] Achieved - Continue current nutrition intervention(s)  [] Current medical condition precludes nutrition intervention at this time.    MONITORING AND EVALUATION:   RD remains available upon request and will follow up per protocol.    Holly Sam MS, Dietetic Intern ([employer])   Mindy Wilson RDN CDN ([employer])

## 2025-01-24 NOTE — PROGRESS NOTE ADULT - SUBJECTIVE AND OBJECTIVE BOX
NEUROLOGY FOLLOW-UP CONSULT NOTE    RFC: Sepsis, seizures    Interval history: Patient with progressively worsening respiratory and mental status on 1/23. RRT called for this as he was having significant desaturations despite AVAPS. Decision was made to intubate and transfer to MICU. He remains intubated and sedated. No acute neurologic events overnight. No reports of any focal neurologic deficits, current seizure-like activity, or abnormal movements.    Meds:  MEDICATIONS  (STANDING):  albuterol/ipratropium for Nebulization 3 milliLiter(s) Nebulizer every 6 hours  atorvastatin 20 milliGRAM(s) Oral at bedtime  bacitracin   Ointment 1 Application(s) Topical two times a day  buMETAnide Infusion 4 mG/Hr (20 mL/Hr) IV Continuous <Continuous>  chlorhexidine 0.12% Liquid 15 milliLiter(s) Oral Mucosa every 12 hours  chlorhexidine 2% Cloths 1 Application(s) Topical <User Schedule>  chlorhexidine 2% Cloths 1 Application(s) Topical <User Schedule>  dexMEDEtomidine Infusion 0.5 MICROgram(s)/kG/Hr (12.9 mL/Hr) IV Continuous <Continuous>  escitalopram 15 milliGRAM(s) Oral daily  gabapentin Solution 300 milliGRAM(s) Oral two times a day  heparin   Injectable 7500 Unit(s) SubCutaneous every 12 hours  influenza  Vaccine (HIGH DOSE) 0.5 milliLiter(s) IntraMuscular once  lacosamide IVPB 100 milliGRAM(s) IV Intermittent every 12 hours  lamoTRIgine 100 milliGRAM(s) Oral two times a day  levETIRAcetam  IVPB 1500 milliGRAM(s) IV Intermittent two times a day  lidocaine   4% Patch 1 Patch Transdermal daily  lurasidone 40 milliGRAM(s) Oral daily  norepinephrine Infusion 0.05 MICROgram(s)/kG/Min (9.68 mL/Hr) IV Continuous <Continuous>  pantoprazole  Injectable 40 milliGRAM(s) IV Push daily  piperacillin/tazobactam IVPB.. 3.375 Gram(s) IV Intermittent every 8 hours  polyethylene glycol 3350 17 Gram(s) Oral daily  propofol Infusion 50 MICROgram(s)/kG/Min (31 mL/Hr) IV Continuous <Continuous>  senna Syrup 10 milliLiter(s) Oral at bedtime  sodium chloride 3%  Inhalation 4 milliLiter(s) Inhalation every 12 hours    MEDICATIONS  (PRN):  fentaNYL    Injectable 25 MICROGram(s) IV Push every 4 hours PRN Moderate Pain (4 - 6)  nystatin Powder 1 Application(s) Topical three times a day PRN fungal infection you may see and want to treat    GTT:  Propofol 30 mcg/kg/min  Bumetanide 4 mg/hour  Norepinephrine 0.02 mcg/kg/min    PMHx/PSHx/FHx/SHx:  Syncope and collapse    Anemia    Handoff    MEWS Score    Hypertension, unspecified type    Other hyperlipidemia    History of epilepsy    Paranoid schizophrenia    Obstructive sleep apnea    Testicular cancer    Syncope    Syncopal seizure    Acute chest pain    Non-ST elevation MI (NSTEMI)    Stage 3 chronic kidney disease    Influenza A    HLD (hyperlipidemia)    Schizophrenia    VAZQUEZ on CPAP    Testicular cancer    Prophylactic measure    Elevated troponin    Mild HTN    HTN (hypertension)    Acute respiratory failure with hypoxia    Obstructive sleep apnea    Fever    Seizure    CKD (chronic kidney disease)    H/O Spinal surgery    FALL    90+    Influenza A    [email or web address]        Allergies:  latex (Rash)  penicillin (Hives)  seasonal allergies (Unknown)      ROS: Due to clinical condition unable to assess (MADELINE)    O:  T(C): 37.3 (01-24-25 @ 07:45), Max: 39.2 (01-23-25 @ 15:00)  HR: 5 (01-24-25 @ 12:03) (5 - 124)  BP: 99/61 (01-24-25 @ 12:00) (78/50 - 206/100)  RR: 24 (01-24-25 @ 12:00) (14 - 48)  SpO2: 97% (01-24-25 @ 12:03) (90% - 98%)    Focused neurologic exam:  MS - Intubated, sedated, likely comatose with very questionable BTT, no speech output, does not follow commands. MADELINE orientation, rep/naming, attn/conc/recent and remote memory/fund of knowledge  CN - Pupils with R > L size but reactive b/l, EOMI by OCR, (+) face sens/str by corneals b/l, (-) spontaneous respirations (vent rate 24 bpm), (-) cough. MADELINE VF, hearing, tongue/palate, trap/SCM  Motor - Normal bulk and dec tone throughout. No spontaneous movements noted. No grimace or withdrawal to strong tactile stimuli in any extremity but he does have triple flexion of BLE's  Sens - As per Motor above  DTR's - 2+ L BR, 0+ rest, and neutral b/l plantar response  Coord - MADELINE  Gait and station - MADELINE    Pertinent labs/studies:  CBC with inc WBC 16.33, low H/H 11/35 and MCV WNL, low plt 109  PT/INR WNL, PTT WNL  BMP with inc BUN/Cr 62/3.61 (GFR dec 18), otherwise essentially WNL  Albumin dec 3.0, LFT's with inc ALT/AST/alk phos 113/71/129  Mg WNL, Phos inc 6.7  UA (-), RVP (+) - Influenza A    vEEG 1/18, 1/19 -  EEG Classification:    Abnormal EEG in lethargic state  1. Moderate diffuse background slowing    -------------------------------------------------------------------------------------------------------  EEG Impression / Clinical Correlate:    Abnormal prolonged EEG study due to Moderate diffuse cerebral dysfunction that is not specific in etiology or at least partly due to medications  No epileptic discharges recorded.  No seizures recorded.      < from: CT Head No Cont (01.17.25 @ 20:08) >    No CT evidence of acute intracranial pathology.    Scattered fecal thickening.  Air-fluid levels in the maxillary sinuses   may represent trapped secretions versus sinusitis.    Nonspecific trace left mastoid effusion.    < end of copied text >      < from: CT Head No Cont (01.16.25 @ 11:15) >    CT brain:  No acute intracranial hemorrhage, brain edema, or mass effect.  No displaced calvarial fracture.    CT cervical spine:  No acute fracture or traumatic subluxation.  No prevertebral soft tissue swelling.  Degenerative changes.    CT maxillofacial bones:  No acute fracture or traumatic subluxation.    Air-fluid level in the bilateral maxillary sinuses, correlate for the   presence of acute sinusitis.    Intraorbital contents unremarkable.    Relatively mild right supraorbital and premaxillary/buccal soft tissue   swelling/hematomas    < end of copied text >

## 2025-01-24 NOTE — PROGRESS NOTE ADULT - SUBJECTIVE AND OBJECTIVE BOX
[facility]   [phone]  RENAL FOLLOW UP NOTE  --------------------------------------------------------------------------------  Chief Complaint:    24 hour events/subjective:    seen earlier   intubated     PAST HISTORY  --------------------------------------------------------------------------------  No significant changes to PMH, PSH, FHx, SHx, unless otherwise noted    ALLERGIES & MEDICATIONS  --------------------------------------------------------------------------------  Allergies    penicillin (Hives)  seasonal allergies (Unknown)    Intolerances    latex (Rash)    Standing Inpatient Medications  albuterol/ipratropium for Nebulization 3 milliLiter(s) Nebulizer every 6 hours  atorvastatin 20 milliGRAM(s) Oral at bedtime  bacitracin   Ointment 1 Application(s) Topical two times a day  buMETAnide Infusion 4 mG/Hr IV Continuous <Continuous>  chlorhexidine 0.12% Liquid 15 milliLiter(s) Oral Mucosa every 12 hours  chlorhexidine 2% Cloths 1 Application(s) Topical <User Schedule>  chlorhexidine 2% Cloths 1 Application(s) Topical <User Schedule>  dexMEDEtomidine Infusion 0.5 MICROgram(s)/kG/Hr IV Continuous <Continuous>  escitalopram 15 milliGRAM(s) Oral daily  gabapentin Solution 300 milliGRAM(s) Oral two times a day  heparin   Injectable 7500 Unit(s) SubCutaneous every 12 hours  influenza  Vaccine (HIGH DOSE) 0.5 milliLiter(s) IntraMuscular once  lacosamide IVPB 100 milliGRAM(s) IV Intermittent every 12 hours  lamoTRIgine 100 milliGRAM(s) Oral two times a day  levETIRAcetam  IVPB 1500 milliGRAM(s) IV Intermittent two times a day  lidocaine   4% Patch 1 Patch Transdermal daily  lurasidone 40 milliGRAM(s) Oral daily  norepinephrine Infusion 0.05 MICROgram(s)/kG/Min IV Continuous <Continuous>  pantoprazole  Injectable 40 milliGRAM(s) IV Push daily  piperacillin/tazobactam IVPB.. 3.375 Gram(s) IV Intermittent every 8 hours  polyethylene glycol 3350 17 Gram(s) Oral daily  propofol Infusion 50 MICROgram(s)/kG/Min IV Continuous <Continuous>  senna Syrup 10 milliLiter(s) Oral at bedtime  sodium chloride 3%  Inhalation 4 milliLiter(s) Inhalation every 12 hours    PRN Inpatient Medications  fentaNYL    Injectable 25 MICROGram(s) IV Push every 4 hours PRN  nystatin Powder 1 Application(s) Topical three times a day PRN      REVIEW OF SYSTEMS  --------------------------------------------------------------------------------        VITALS/PHYSICAL EXAM  --------------------------------------------------------------------------------  T(C): 37.3 (01-24-25 @ 20:00), Max: 37.5 (01-24-25 @ 04:00)  HR: 95 (01-24-25 @ 19:30) (76 - 98)  BP: 100/56 (01-24-25 @ 19:30) (78/50 - 123/71)  RR: 24 (01-24-25 @ 19:30) (14 - 25)  SpO2: 95% (01-24-25 @ 19:30) (94% - 98%)  Wt(kg): --        01-23-25 @ 07:01  -  01-24-25 @ 07:00  --------------------------------------------------------  IN: 1219.5 mL / OUT: 875 mL / NET: 344.5 mL    01-24-25 @ 07:01  -  01-24-25 @ 20:59  --------------------------------------------------------  IN: 952.2 mL / OUT: 1805 mL / NET: -852.8 mL      Physical Exam:  	    Gen: intubated  obese   	no jvd  	Pulm: decrease bs  no rales or ronchi or wheezing  	CV: tachy  S1S2; no rub  	Abd: hypoactive bs soft distended  	UE: Warm, no cyanosis  no clubbing,  no edema; no asterixis  	LE: Warm, no cyanosis  no clubbing, no edema L thigh medial ecchymosis   	Neuro:  intubated       LABS/STUDIES  --------------------------------------------------------------------------------              11.2   16.33 >-----------<  109      [01-24-25 @ 00:03]              34.9     136  |  100  |  64  ----------------------------<  141      [01-24-25 @ 17:15]  3.9   |  18  |  3.92        Ca     9.3     [01-24-25 @ 17:15]      Mg     2.5     [01-24-25 @ 17:15]      Phos  6.9     [01-24-25 @ 17:15]    TPro  6.5  /  Alb  3.0  /  TBili  0.6  /  DBili  x   /  AST  78  /  ALT  111  /  AlkPhos  132  [01-24-25 @ 17:15]    PT/INR: PT 11.7 , INR 1.02       [01-24-25 @ 00:03]  PTT: 31.6       [01-24-25 @ 00:03]      Creatinine Trend:  SCr 3.92 [01-24 @ 17:15]  SCr 3.61 [01-24 @ 10:29]  SCr 2.95 [01-24 @ 00:03]  SCr 2.69 [01-23 @ 18:16]  SCr 2.09 [01-23 @ 07:38]

## 2025-01-24 NOTE — PROGRESS NOTE ADULT - ASSESSMENT
ASSESSMENT AND PLAN:  69M w/ hx of metastatic testicular cancer (s/p L orchiectomy, on etoposide/cisplatin chemo, last dose 6/2024), epilepsy (grand-mal seizures), schizophrenia, developmental delay, HTN, HLD, VAZQUEZ, stage III CKD, obesity, secondary hyperparathyroidism, anemia, thrombocytopenia, ? spinal surgery who was admitted on 1/16/25 for fall and syncope. On 1/17 patient had multiple RRTs called for grand mal seizure activity and  patient had x2 more episode and was ultimately given ativan 2mg, Vimpat load 200mg, and intubated for airway protection with MICU transfer. Extubated 1/19 - to AVAPS, post extubation with increased secretion, now better; downgraded 1/21; While on medical floors; patient developed respiratory distress; now intubated for suspected aspiration pna vs flash pulmonary edema.     CHANGES/UPDATES TODAY  ***        ===NEURO===  #Status Epilepticus  #Grand Mal Seizures  #Schizophrenia  #Depression  - c/w AEDs: Keppra 1.5g BID, Lamotrigine 100mg BID, Gabapentin 300mg BID, Vimpat 100mg BID  - per Neuro MRI brain w and wo contrast to look for potential seizure foci that could cause increased frequency especially given hx of metastatic testicular cancer  - CT head with no brain mets  - s/p vEEG, negative for seizures  - Neuro following, appreciate recs  - c/w Escitalopram and Lurasidone  - No acute changes/concer for seizure during RRT  - Consider MRI when medically improved  - Ativan for seizures prn    ===CVS===  #HTN #HLD   - Atorvastatin 20mg qhs  -post extubation concerns for Flash pulmonary edema with SBP 180s, received Hydralazine 10 x 2  -1/17 echo with Left ventricular systolic function is normal with an ejection fraction of 62 %.There are no regional wall motion abnormalities seen. Normal right ventricular cavity size and normal right ventricular systolic function.  - Re-admitted for concern of flash Pulm Edema/aspiration causing respiratory distress  - Bumex 1mg BID; goal net negative  - BP Stable while sedated on vent    ===PULM===  #s/p Intubation extubated 1/19 to AVAPS  #S/p Intubation 1/23  #VAZQUEZ  - 1/17 CT chest with anterior bowing of the posterior tracheal wall, suggestive of tracheomalacia.  and trace right pleural effusion.  Intubated 1/23 for respiratory distress likely 2/2 flash pulm edema  - Continue diuresis with bumex 1mg BID    ===RENAL===  #Metabolic acidosis  #Lactic acidosis  #SHAGUFTA on CKD Stage III  - COntinue to trend Creatinine  - Diurese as above for pHTN  - Nephrology consulted; appreciate recs      ===GI===  #OG  - Begin Trickle Feeds  - Increase as tolerated    ===ID===  #Pneumonia  #Flu A positive  - Ceftriaxone 1g daily (1/17-1/21)  - Tamiflu 30mg BID (1/18-1/21)  - Follow cultures: BCx, Bronch (all neg to date)  - BCx/Sputum/Urine Cx 1/23 -   - Cefepime (1/23 -     ===ENDO===  #At risk of hypoglycemia  #Hx of secondary hyperparathyroidism  - FS q6h while NPO    ===HEME/ONC===  #Metastatic testicular cancer, recent chemo 06/2024  #DVT ppx  - 7500U subQ heparin    ===ETHICS===  #Code Status: FULL CODE ASSESSMENT AND PLAN:  69M w/ hx of metastatic testicular cancer (s/p L orchiectomy, on etoposide/cisplatin chemo, last dose 6/2024), epilepsy (grand-mal seizures), schizophrenia, developmental delay, HTN, HLD, VAZQUEZ, stage III CKD, obesity, secondary hyperparathyroidism, anemia, thrombocytopenia, ? spinal surgery who was admitted on 1/16/25 for fall and syncope. On 1/17 patient had multiple RRTs called for grand mal seizure activity and  patient had x2 more episode and was ultimately given ativan 2mg, Vimpat load 200mg, and intubated for airway protection with MICU transfer. Extubated 1/19 - to AVAPS, post extubation with increased secretion, now better; downgraded 1/21; While on medical floors; patient developed respiratory distress; now intubated for suspected aspiration pna vs flash pulmonary edema with new fever and leukocytosis.     CHANGES/UPDATES TODAY  - Ordered MR Head w/wo for seizure foci per neuro recs, 10pm  - Ordered MR Orbit/Face/Neck non con for further eval neck mass, 10pm  - Continuing AEDs as below and if seizure, ativan  - Weaning vent as able  - Treating pneumonia with zosyn    ===NEURO===  #Intubated #Sedated  - Sedation: Precedex, Propofol, wean as able   - Pain: PRN fentanyl 25mcg q4h, PRN Lidocaine patch 4%  - SATs as able    #Status Epilepticus  #Grand Mal Seizures  - AEDs: Keppra 1.5g BID, Lamotrigine 100mg BID, Gabapentin 300mg BID, Vimpat 100mg BID  - For Seizures: Ativan  - MRI brain w/wo to eval new foci iso metastastic dz  - Neuro following, appreciate recs  - s/p vEEG negative for seizures  - s/p CTH negative for acute findings    #Schizophrenia  #Depression  - Escitalopram 15mg daily  - Lurasidone 40mg daily    ===CVS===  #HTN #HLD  #?Flash Pulmonary Edema  #Normal EF (62%)  - Bumex 4mg/hr gtt  - Atorvastatin 20mg qhs  - GOAL: Net negative, Normotension    #Hypotension  - Norepi prn, wean as able    ===PULM===  #Respiratory Failure s/t ?Edema vs PNA  #VAZQUEZ on CPAP at home-->?AVAPS  - Duonebs 3mL q4h  - Saline Nebs 4mL BID  - Antibiotics as below  - Cont Vent, daily SBTs as able, wean as able  - Cont Bumex as above 4mg/hr gtt  - S/p Intubation 1/17 for status epilepticus  - S/p Extubation 1/23 to AVAPS    #Neck Mass 4mm nodule  #CT with ?Tracheomalacia  - US of neck, TSH, T3, T4  - MR Neck/Face, MR Brain scheduled already  - Likely outpt follow up    ===RENAL===  #Metabolic Acidosis  #SHAGUFTA on CKD Stage III  #Electrolytes  - As above, Bumex 4mg/hr gtt  - Trend labs, I/Os, normal lytes  - Nephrology following, appreciate recs    ===GI===  #Diet #OG Tube  #Aspiration risk  #Bowel Regimen  - Tube feeds  - Pantoprazole 40mg daily  - Senna, Miralax    ===ID===  #?Aspiration Pneumonia  #Pneumonia RLL, CAP, treated  #FluA+, treated  - Zosyn (1/23-?) empirically, if rash start cefepime  - BCx: 1/23(NGTD 24h), 1/19 (NGTD 4d)  - Sputum Cx: 1/23 (Pending), 1/18 (neg)  - Labs: MRSA (1/23 neg), Legionella (1/24 pending)  - S/p Ceftriaxone 1g daily (1/17-1/21) for CAP  - S/p Tamiflu 30mg BID (1/18-1/21) for FluA     ===ENDO===  #At risk of hypoglycemia  #Hx of secondary hyperparathyroidism  - FS q6h while NPO    ===HEME/ONC===  #Metastatic testicular cancer, recent chemo 06/2024  - MR Brain as above    #DVT ppx  - Heparin SQ 7500U    ===ETHICS===  #Code Status: FULL CODE.  - Palliative consulted, following    Signed,  Brittani Virgen PGY1  Emergency Medicine ASSESSMENT AND PLAN:  69M w/ hx of metastatic testicular cancer (s/p L orchiectomy, on etoposide/cisplatin chemo, last dose 6/2024), epilepsy (grand-mal seizures), schizophrenia, developmental delay, HTN, HLD, VAZQUEZ, stage III CKD, obesity, secondary hyperparathyroidism, anemia, thrombocytopenia, ? spinal surgery who was admitted on 1/16/25 for fall and syncope. On 1/17 patient had multiple RRTs called for grand mal seizure activity and  patient had x2 more episode and was ultimately given ativan 2mg, Vimpat load 200mg, and intubated for airway protection with MICU transfer. Extubated 1/19 - to AVAPS, post extubation with increased secretion, now better; downgraded 1/21; While on medical floors; patient developed respiratory distress; now intubated for suspected aspiration pna vs flash pulmonary edema with new fever and leukocytosis.     CHANGES/UPDATES TODAY  - Treating aspiration pneumonia with zosyn  - Treating flash edema / with bumex gtt  - Continuing AEDs as below and if seizure, ativan  - Weaning vent as able  - Ordered MR Head w/wo for seizure foci per neuro recs, 10pm  - Ordered MR Orbit/Face/Neck non con for further eval neck mass, 10pm    ===NEURO===  #Intubated #Sedated  - Sedation: Precedex, Propofol, wean as able   - Pain: PRN fentanyl 25mcg q4h, PRN Lidocaine patch 4%  - SATs as able    #Status Epilepticus  #Grand Mal Seizures  - AEDs: Keppra 1.5g BID, Lamotrigine 100mg BID, Gabapentin 300mg BID, Vimpat 100mg BID  - For Seizures: Ativan  - MRI brain w/wo to eval new foci iso metastastic dz  - Neuro following, appreciate recs  - s/p vEEG negative for seizures  - s/p CTH negative for acute findings    #Schizophrenia  #Depression  - Escitalopram 15mg daily  - Lurasidone 40mg daily    ===CVS===  #HTN #HLD  #?Flash Pulmonary Edema  #Normal EF (62%)  - Bumex 4mg/hr gtt  - Atorvastatin 20mg qhs  - GOAL: Net negative, Normotension    #Hypotension  - Norepi prn, wean as able    ===PULM===  #Respiratory Failure s/t ?Edema vs PNA  #VAZQUEZ on CPAP at home-->?AVAPS  - Duonebs 3mL q4h  - Saline Nebs 4mL BID  - Antibiotics as below  - Cont Vent, daily SBTs as able, wean as able  - Cont Bumex as above 4mg/hr gtt  - S/p Intubation 1/17 for status epilepticus  - S/p Extubation 1/23 to AVAPS    #Neck Mass 4mm nodule  #CT with ?Tracheomalacia  - US of neck, TSH, T3, T4  - MR Neck/Face, MR Brain scheduled already  - Likely outpt follow up    ===RENAL===  #Metabolic Acidosis  #SHAGUFTA on CKD Stage III  #Electrolytes  - As above, Bumex 4mg/hr gtt  - Trend labs, I/Os, normal lytes  - Nephrology following, appreciate recs    ===GI===  #Diet #OG Tube  #Aspiration risk  #Bowel Regimen  - Tube feeds  - Pantoprazole 40mg daily  - Senna, Miralax    ===ID===  #?Aspiration Pneumonia  #Pneumonia RLL, CAP, treated  #FluA+, treated  - Zosyn (1/23-?) empirically, if rash start cefepime  - BCx: 1/23(NGTD 24h), 1/19 (NGTD 4d)  - Sputum Cx: 1/23 (Pending), 1/18 (neg)  - Labs: MRSA (1/23 neg), Legionella (1/24 pending)  - S/p Ceftriaxone 1g daily (1/17-1/21) for CAP  - S/p Tamiflu 30mg BID (1/18-1/21) for FluA     ===ENDO===  #At risk of hypoglycemia  #Hx of secondary hyperparathyroidism  - FS q6h while NPO    ===HEME/ONC===  #Metastatic testicular cancer, recent chemo 06/2024  - MR Brain as above    #DVT ppx  - Heparin SQ 7500U    ===ETHICS===  #Code Status: FULL CODE.  - Palliative consulted, following    Signed,  Brittani Virgen PGY1  Emergency Medicine

## 2025-01-24 NOTE — PROGRESS NOTE ADULT - SUBJECTIVE AND OBJECTIVE BOX
DATE OF SERVICE: 01-24-25 @ 09:57    Patient is a 69y old  Male who presents with a chief complaint of seizure, flu, elevated trop (24 Jan 2025 07:26)      INTERVAL HISTORY: intubated 1/23    REVIEW OF SYSTEMS: intubated and sedated    TELEMETRY Personally reviewed:  	  MEDICATIONS:  buMETAnide Infusion 4 mG/Hr IV Continuous <Continuous>  norepinephrine Infusion 0.05 MICROgram(s)/kG/Min IV Continuous <Continuous>        PHYSICAL EXAM:  T(C): 37.3 (01-24-25 @ 07:45), Max: 39.3 (01-23-25 @ 12:23)  HR: 97 (01-24-25 @ 09:46) (75 - 124)  BP: 105/65 (01-24-25 @ 09:00) (78/50 - 206/100)  RR: 24 (01-24-25 @ 09:00) (14 - 48)  SpO2: 97% (01-24-25 @ 09:46) (90% - 98%)  Wt(kg): --  I&O's Summary    23 Jan 2025 07:01  -  24 Jan 2025 07:00  --------------------------------------------------------  IN: 1219.5 mL / OUT: 875 mL / NET: 344.5 mL          Appearance: In no distress	  HEENT:    PERRL, EOMI	  Cardiovascular:  S1 S2, No JVD  Respiratory: Lungs clear to auscultation	  Gastrointestinal:  Soft, Non-tender, + BS	  Vascularature:  No edema of LE  Psychiatric: Appropriate affect   Neuro: no acute focal deficits                               11.2   16.33 )-----------( 109      ( 24 Jan 2025 00:03 )             34.9     01-24    138  |  105  |  50[H]  ----------------------------<  173[H]  4.8   |  17[L]  |  2.95[H]    Ca    9.3      24 Jan 2025 00:03  Phos  5.5     01-24  Mg     2.4     01-24    TPro  6.2  /  Alb  3.1[L]  /  TBili  0.6  /  DBili  x   /  AST  86[H]  /  ALT  131[H]  /  AlkPhos  140[H]  01-24        Labs personally reviewed      ASSESSMENT/PLAN: 	        69M w/ hx of metastatic testicular cancer (s/p L orchiectomy, on etoposide/cisplatin chemo, last dose 6/2024), epilepsy (grand-mal seizures), schizophrenia, developmental delay, HTN, HLD, VAZQUEZ, stage III CKD, obesity, secondary hyperparathyroidism, anemia, thrombocytopenia, ? spinal surgery who was admitted on 1/16/25 for fall and syncope. On 1/17 patient had multiple RRTs called for grand mal seizure activity and  patient had x2 more episode and was ultimately given ativan 2mg, Vimpat load 200mg, and intubated for airway protection with MICU transfer. Extubated 1/19 - to AVAPS, post extubation with increased secretion, now better; downgraded 1/21; While on medical floors; patient developed respiratory distress; now intubated for suspected aspiration pna vs flash pulmonary edema.     Problem/Plan #1: Acute Hypoxic Respiratory Failure  - Intubated for airway protection i/s/o multiple RRTs for grand mal seizure activity, Extubated 1/19 to AVAPS  - Reintubated 1/23 after hypoxic episode for suspected aspiration pna vs flash pulmonary edema.   - BNP 3949  - CXR shows mild pulm edema on 1/16; Repeat CXR pending  - TTE with preserved EF, no WMA, dilated aortic root  - c/w Bumex 1mg IV BID  - c/w IV Abx as per primary team  - Monitor strict I&Os, daily weights    Problem/Plan #2: Hypertensive Urgency  - amlodipine increased to 10mg daily  - c/w diuresis as noted above  - BP now soft. Exercise caution in further uptitrating medications. Amlodipine d/mercedez 1/23    Problem/Plan #3: HLD  - c/w atorvastatin 20mg daily    Problem/Plan #4: DVT Ppx  - c/w SQ heparin      TUAN Vanessa,  [facility]  Cardiovascular Medicine  [address], [address]  Available through call or text on [employer]  Office: [phone]     DATE OF SERVICE: 01-24-25 @ 09:57    Patient is a 69y old  Male who presents with a chief complaint of seizure, flu, elevated trop (24 Jan 2025 07:26)      INTERVAL HISTORY: intubated 1/23    REVIEW OF SYSTEMS: intubated and sedated    TELEMETRY Personally reviewed: Sinus 90s   	  MEDICATIONS:  buMETAnide Infusion 4 mG/Hr IV Continuous <Continuous>  norepinephrine Infusion 0.05 MICROgram(s)/kG/Min IV Continuous <Continuous>        PHYSICAL EXAM:  T(C): 37.3 (01-24-25 @ 07:45), Max: 39.3 (01-23-25 @ 12:23)  HR: 97 (01-24-25 @ 09:46) (75 - 124)  BP: 105/65 (01-24-25 @ 09:00) (78/50 - 206/100)  RR: 24 (01-24-25 @ 09:00) (14 - 48)  SpO2: 97% (01-24-25 @ 09:46) (90% - 98%)  Wt(kg): --  I&O's Summary    23 Jan 2025 07:01  -  24 Jan 2025 07:00  --------------------------------------------------------  IN: 1219.5 mL / OUT: 875 mL / NET: 344.5 mL          Appearance: In no distress, intubated & sedated  HEENT:    PERRL, EOMI	  Cardiovascular:  S1 S2, No JVD  Respiratory: Lungs clear to auscultation	  Gastrointestinal:  Soft, Non-tender, + BS	  Vascularature:  No edema of LE                              11.2   16.33 )-----------( 109      ( 24 Jan 2025 00:03 )             34.9     01-24    138  |  105  |  50[H]  ----------------------------<  173[H]  4.8   |  17[L]  |  2.95[H]    Ca    9.3      24 Jan 2025 00:03  Phos  5.5     01-24  Mg     2.4     01-24    TPro  6.2  /  Alb  3.1[L]  /  TBili  0.6  /  DBili  x   /  AST  86[H]  /  ALT  131[H]  /  AlkPhos  140[H]  01-24        Labs personally reviewed    < from: Xray Chest 1 View- PORTABLE-Urgent (Xray Chest 1 View- PORTABLE-Urgent .) (01.23.25 @ 07:26) >    IMPRESSION:  Mild pulmonary vascular congestion.      ASSESSMENT/PLAN: 	        69M w/ hx of metastatic testicular cancer (s/p L orchiectomy, on etoposide/cisplatin chemo, last dose 6/2024), epilepsy (grand-mal seizures), schizophrenia, developmental delay, HTN, HLD, VAZQEUZ, stage III CKD, obesity, secondary hyperparathyroidism, anemia, thrombocytopenia, ? spinal surgery who was admitted on 1/16/25 for fall and syncope. On 1/17 patient had multiple RRTs called for grand mal seizure activity and  patient had x2 more episode and was ultimately given ativan 2mg, Vimpat load 200mg, and intubated for airway protection with MICU transfer. Extubated 1/19 - to AVAPS, post extubation with increased secretion, now better; downgraded 1/21; While on medical floors; patient developed respiratory distress; now intubated for suspected aspiration pna vs flash pulmonary edema.     Problem/Plan #1: Acute Hypoxic Respiratory Failure  - Intubated for airway protection i/s/o multiple RRTs for grand mal seizure activity, Extubated 1/19 to AVAPS  - TTE with preserved EF, no WMA, dilated aortic root  - BNP 3949 1/23  - CXR shows mild pulm edema on 1/16; Repeat CXR 1/23 with mild pulm vasc congestion  - Reintubated 1/23 after hypoxic episode for suspected aspiration pna vs flash pulmonary edema.   - 1/24 was transitioned to bumex gtt  - c/w IV Abx as per primary team  - Monitor strict I&Os, daily weights    Problem/Plan #2: Hypertensive Urgency  - amlodipine increased to 10mg daily  - c/w diuresis as noted above  - BP now soft. Amlodipine d/mercedez 1/23  - In ICU on pressors    Problem/Plan #3: HLD  - c/w atorvastatin 20mg daily    Problem/Plan #4: DVT Ppx  - c/w SQ heparin      TUAN Vanessa, DO FACC  Cardiovascular Medicine  [address], [address]  Available through call or text on [employer]  Office: [phone]

## 2025-01-24 NOTE — PROGRESS NOTE ADULT - SUBJECTIVE AND OBJECTIVE BOX
[facility]  JACINTO Horton Y. Patel, S. Shah, G. Casimir  [phone]  ([phone] - weekdays after 5pm and weekends)    Name: OSCAR MILAN  Age/Gender: 69y Male  MRN: [id number]    Interval History:  Patient seen and examined earlier this morning.   Patient intubated, on pressors, sedated.  Unable to obtain ROS. Notes reviewed  Allergies: penicillin (Hives)  seasonal allergies (Unknown)      Objective:  Vitals:   T(F): 99.1 (01-24-25 @ 07:45), Max: 102.7 (01-23-25 @ 12:23)  HR: 97 (01-24-25 @ 09:46) (75 - 124)  BP: 105/65 (01-24-25 @ 09:00) (78/50 - 206/100)  RR: 24 (01-24-25 @ 09:00) (14 - 48)  SpO2: 97% (01-24-25 @ 09:46) (90% - 98%)  Physical Examination:  General: no acute distress, intubated   HEENT: normocephalic, ETT, OGT  Respiratory: decreased breath sounds  Cardiovascular: S1 and S2 present, normal rate   Gastrointestinal: obese, nontender, nondistended  Extremities: no edema, no cyanosis  Skin: no visible rash, R brow wound     Laboratory Studies:  CBC:                       11.2   16.33 )-----------( 109      ( 24 Jan 2025 00:03 )             34.9     WBC Trend:  16.33 01-24-25 @ 00:03  13.21 01-23-25 @ 15:10  11.18 01-23-25 @ 07:38  6.42 01-23-25 @ 00:39  4.89 01-22-25 @ 07:28  6.58 01-21-25 @ 00:43  10.49 01-20-25 @ 00:49  9.51 01-18-25 @ 23:35  9.51 01-18-25 @ 15:08  17.77 01-17-25 @ 22:20  13.82 01-17-25 @ 19:13    CMP: 01-24    138  |  105  |  50[H]  ----------------------------<  173[H]  4.8   |  17[L]  |  2.95[H]    Ca    9.3      24 Jan 2025 00:03  Phos  5.5     01-24  Mg     2.4     01-24    TPro  6.2  /  Alb  3.1[L]  /  TBili  0.6  /  DBili  x   /  AST  86[H]  /  ALT  131[H]  /  AlkPhos  140[H]  01-24    Creatinine: 2.95 mg/dL (01-24-25 @ 00:03)  Creatinine: 2.69 mg/dL (01-23-25 @ 18:16)  Creatinine: 2.09 mg/dL (01-23-25 @ 07:38)  Creatinine: 2.08 mg/dL (01-23-25 @ 00:39)  Creatinine: 2.23 mg/dL (01-22-25 @ 07:26)  Creatinine: 2.53 mg/dL (01-21-25 @ 00:43)  Creatinine: 2.49 mg/dL (01-20-25 @ 00:50)  Creatinine: 2.57 mg/dL (01-19-25 @ 19:32)  Creatinine: 3.14 mg/dL (01-18-25 @ 23:35)  Creatinine: 3.25 mg/dL (01-18-25 @ 15:08)  Creatinine: 2.56 mg/dL (01-17-25 @ 22:46)  Creatinine: 2.55 mg/dL (01-17-25 @ 19:13)  Creatinine: 2.27 mg/dL (01-17-25 @ 12:49)    LIVER FUNCTIONS - ( 24 Jan 2025 00:03 )  Alb: 3.1 g/dL / Pro: 6.2 g/dL / ALK PHOS: 140 U/L / ALT: 131 U/L / AST: 86 U/L / GGT: x           Microbiology: reviewed   Culture - Blood (collected 01-23-25 @ 00:15)  Source: .Blood BLOOD  Preliminary Report (01-24-25 @ 04:01):    No growth at 24 hours    Culture - Blood (collected 01-23-25 @ 00:10)  Source: .Blood BLOOD  Preliminary Report (01-24-25 @ 04:01):    No growth at 24 hours    Culture - Blood (collected 01-20-25 @ 01:52)  Source: .Blood BLOOD  Preliminary Report (01-24-25 @ 09:00):    No growth at 4 days    Culture - Blood (collected 01-19-25 @ 19:28)  Source: .Blood BLOOD  Preliminary Report (01-24-25 @ 04:00):    No growth at 4 days    Culture - Sputum (collected 01-18-25 @ 00:36)  Source: .Sputum Sputum  Gram Stain (01-18-25 @ 06:52):    Moderate polymorphonuclear leukocytes per low power field    No Squamous epithelial cells per low power field    No organisms seen per oil power field  Final Report (01-19-25 @ 08:17):    No growth    Culture - Blood (collected 01-17-25 @ 15:10)  Source: .Blood BLOOD  Final Report (01-22-25 @ 20:00):    No growth at 5 days    Culture - Blood (collected 01-17-25 @ 14:51)  Source: .Blood BLOOD  Final Report (01-22-25 @ 20:00):    No growth at 5 days    Radiology: reviewed   < from: Xray Chest 1 View- PORTABLE-Urgent (Xray Chest 1 View- PORTABLE-Urgent .) (01.23.25 @ 21:38) >  FINDINGS: The cardiac silhouette is normal in size. There is a right   chest wall Mediport. Endotracheal tube, unchanged. There is an OGT with   tip in the distal stomach. There is unchanged pulmonary vascular   congestion.    IMPRESSION: OGT in good position with tip in the distal stomach    < end of copied text >  < from: VA Duplex Lower Ext Vein Scan, Bilat (01.23.25 @ 17:37) >  PRESSION:  No evidence of deep venous thrombosis in either lower extremity.    < end of copied text >    < from: Xray Chest 1 View- PORTABLE-Urgent (Xray Chest 1 View- PORTABLE-Urgent .) (01.23.25 @ 15:56) >  IMPRESSION:  Support devices as described above.  Right middle lobe opacity, may represent infection in the appropriate   clinical setting.    --- End of Report ---    < end of copied text >  < from: Xray Chest 1 View- PORTABLE-Urgent (Xray Chest 1 View- PORTABLE-Urgent .) (01.23.25 @ 07:26) >  FINDINGS:  A right-sided port is seen with its tip in the right atrium.  The heart is not well assessed on an AP film.  There are low lung volumes. There are mild perihilar interstitial   opacities.  There are no pleural effusions.  There is no pneumothorax.    IMPRESSION:  Mild pulmonary vascular congestion.    --- End of Report ---    < end of copied text >    < from: Xray Chest 1 View- PORTABLE-Urgent (Xray Chest 1 View- PORTABLE-Urgent .) (01.23.25 @ 03:16) >  Findings:  Interval removal of enteric tube. Right upper extremity PICC with tip   overlying the right atrium.  The lungs are grossly clear. No large pleural effusion or pneumothorax.  The heart size cannot be accurately assessed on this projection.  No acute osseous abnormalities. Thoracic spine hardware.    IMPRESSION: Grossly clear lungs.    < end of copied text >    Medications:  albuterol/ipratropium for Nebulization 3 milliLiter(s) Nebulizer every 6 hours  atorvastatin 20 milliGRAM(s) Oral at bedtime  bacitracin   Ointment 1 Application(s) Topical two times a day  buMETAnide Infusion 4 mG/Hr IV Continuous <Continuous>  chlorhexidine 0.12% Liquid 15 milliLiter(s) Oral Mucosa every 12 hours  chlorhexidine 2% Cloths 1 Application(s) Topical <User Schedule>  chlorhexidine 2% Cloths 1 Application(s) Topical <User Schedule>  dexMEDEtomidine Infusion 0.5 MICROgram(s)/kG/Hr IV Continuous <Continuous>  escitalopram 15 milliGRAM(s) Oral daily  fentaNYL    Injectable 25 MICROGram(s) IV Push every 4 hours PRN  gabapentin Solution 300 milliGRAM(s) Oral two times a day  heparin   Injectable 7500 Unit(s) SubCutaneous every 12 hours  influenza  Vaccine (HIGH DOSE) 0.5 milliLiter(s) IntraMuscular once  lacosamide IVPB 100 milliGRAM(s) IV Intermittent every 12 hours  lamoTRIgine 100 milliGRAM(s) Oral two times a day  levETIRAcetam  IVPB 1500 milliGRAM(s) IV Intermittent two times a day  lidocaine   4% Patch 1 Patch Transdermal daily  lurasidone 40 milliGRAM(s) Oral daily  norepinephrine Infusion 0.05 MICROgram(s)/kG/Min IV Continuous <Continuous>  nystatin Powder 1 Application(s) Topical three times a day PRN  pantoprazole  Injectable 40 milliGRAM(s) IV Push daily  piperacillin/tazobactam IVPB.. 3.375 Gram(s) IV Intermittent every 8 hours  polyethylene glycol 3350 17 Gram(s) Oral daily  propofol Infusion 50 MICROgram(s)/kG/Min IV Continuous <Continuous>  senna Syrup 10 milliLiter(s) Oral at bedtime  sodium chloride 3%  Inhalation 4 milliLiter(s) Inhalation every 12 hours    Current Antimicrobials:  piperacillin/tazobactam IVPB.. 3.375 Gram(s) IV Intermittent every 8 hours    Prior/Completed Antimicrobials:  cefTRIAXone   IVPB  cefTRIAXone   IVPB  oseltamivir  piperacillin/tazobactam IVPB..  vancomycin  IVPB

## 2025-01-24 NOTE — PROGRESS NOTE ADULT - ASSESSMENT
69M w/ hx of metastatic testicular cancer (s/p L orchiectomy, on etoposide/cisplatin chemo), epilepsy (grand-mal seizures), schizophrenia, developmental delay, HTN, HLD, VAZQUEZ, stage III CKD, severe obesity, secondary hyperparathyroidism, anemia, thrombocytopenia who presented to ED for fall, possible seizure prior.    Plan:    # Hypoxia: flash pulm edema 2/2 HTN vs. aspiration event vs. respiratory distress related to fever  - S/p RRT 1/23 am and 1/23 pm, for inc WOB on AVAPS, s/p intubation and transfer to MICU, required pressor support  - Sepsis protocol initiated, Follow 1/23 Bcx, in process  - C/w IV abx  - Tachy/ HTN >200 systolic-> likely from fevers-> Cards eval pending (called)  - CXR w/ mild pulm congestion.  - Diuresis as tolerated, on bumex ggt   - Thyroid US pending  - NGT in place, c/w TF's as tolerated  - ID and Pulm following  - Mgmt per ICU    # Syncopal seizure:  -  Patient with hx of epilepsy, generalized grand mal seizures  - C/w Lamictal and Keppra  - CTH/C spine/MF negative for acute ICH, notable for mild R hematoma  - 1/17 sp multiple RRTs  for grand mal seizure activity and was initially recommended sepsis workup and antipyretics given T103F, but patient had x2 more episode and was ultimately given ativan 2mg, vimpat load 200mg, and intubated for airway protection with MICU transfer.   - Extubated 1/20  - Keppra 1.5g BID, Lamotrigine 100mg BID, Gabapentin 300mg BID, Vimpat 150mg BID  - Ativan for seizures PRM  - Per Neuro -> MRI brain w and wo contrast to look for potential seizure foci that could cause increased frequency especially given hx of metastatic testicular cancer; deferred MRI given copious secretions   - CT head with no brain mets  - s/p vEEG, negative for seizures  - Care per MICU-> downgraded 1/21, back to ICU 1/23 for Hypoxia, Increased WOB    # Flu:  - Continue tamiflu, completed 5d on 1/22   - Monitor temps/WBC  - ID following    # CKD3:  - Monitor I/O's  - Serial Cr  - Avoid nephrotoxins  - Renally dose medications  - Renal following    # HTN/ HLD:  - C/w CV meds  - Post extubation concerns for Flash pulmonary edema with SBP 180s, received Hydralazine 10 x 2  - Echo with Left ventricular systolic function is normal with an ejection fraction of 62 %. There are no regional wall motion abnormalities seen. Normal right ventricular cavity size and normal right ventricular systolic function.    # Pneumonia:  - C/w IV abx-> Completed ceftriaxone 5d course on 1/21  - ID following    # Schizophrenia/Depression:  - C/w home meds    # VAZQUEZ:  - CPAP at night    # Hx of metastatic testicular cancer, s/p L orchiectomy, on etoposide/cisplatin chemo, last dose on 6/21/24:  - Outpt Oncology follow-up    # DVT ppx:  - Heparin sq    [employer]    MD. RAFY Knight

## 2025-01-24 NOTE — PROGRESS NOTE ADULT - ASSESSMENT
Patient is a 69 year old male with PMH of metastatic testicular cancer (s/p L orchiectomy, on etoposide/cisplatin chemo, last dose 6/2024), epilepsy (grand-mal seizures), schizophrenia, developmental delay, HTN, HLD, VAZQUEZ, stage III CKD, severe obesity, secondary hyperparathyroidism, anemia, thrombocytopenia who presented to ED for fall, possible seizure prior. Patient reported cough for few weeks with no other respiratory symptoms.   Admitted for further work up for syncope, c/f seizure   Influenza A  1/17 s/p RRTs for grand mal seizure activity, s/p intubation and transfer to MICU  - 1/17 CT chest with anterior bowing of posterior tracheal wall suggestive of tracheomalacia, trace R pleural effusion   - MRSA PCR screen negative    - sputum culture negative    - s/p extubation 1/19   - 1/20 Bcx negative    - s/p ceftriaxone 1/17-1/21   - s/p tamiflu 1/17-1/21    Sepsis/sirs, AHRF possible flash pulmonary edema iso uncontrolled HTN, c/f aspiration   - 1/23 s/p RRT am for hypoxia/inc WOB, febrile last night 100.6F and now 101F this am, tachycardia, leukocytosis 11k   - CXR with pulm vascular congestion, no consolidation noted    - s/p bumex, started AVAPS   - s/p zosyn for possible aspiration pneumonia, then started on cefepime   - repeat COVID/Flu/RSV with known influenza A-likely shedding    - reported occ dysuria, UA negative for pyuria   - 1/23 afternoon s/p RRT for inc WOB on AVAPS, s/p intubation and transfer to MICU, required pressor support  - MRSA PCR screen negative   - 1/24 afebrile overnight, WBC uptrending, weaned off pressor earlier in am, on zosyn   - h/o PCN allergy noted with hives - s/p zosyn 1/23 am and now on since 1/23 pm-appears to be tolerating       Recommendations:   Follow 1/23 Bcx, in process   Send tracheal aspirate culture  Continue empiric zosyn   -if develops rash/rxn--change to meropenem   Diuresis as tolerated, on bumex ggt   Monitor temps/WBC  Aspiration precautions   Continue rest of care per primary team     Over the weekend Dr. Micaela Jurado will be covering for our group. If you have any questions, concerns or new micro data please reach out to them using TEAMS *PREFERRED* or by calling our service at [phone].     Greyson Mart M.D.  [facility]  Available on [employer] TEAMS - *PREFERRED*  [phone]  After 5pm on weekdays and all day on weekends - please call [phone]     Thank you for consulting us and involving us in the management of this patients case. In addition to reviewing history, imaging, documents, labs, microbiology, took into account antibiotic stewardship, local antibiogram and infection control strategies and potential transmission issues.  Patient is a 69 year old male with PMH of metastatic testicular cancer (s/p L orchiectomy, on etoposide/cisplatin chemo, last dose 6/2024), epilepsy (grand-mal seizures), schizophrenia, developmental delay, HTN, HLD, VAZQUEZ, stage III CKD, severe obesity, secondary hyperparathyroidism, anemia, thrombocytopenia who presented to ED for fall, possible seizure prior. Patient reported cough for few weeks with no other respiratory symptoms.   Admitted for further work up for syncope, c/f seizure   Influenza A  1/17 s/p RRTs for grand mal seizure activity, s/p intubation and transfer to MICU  - 1/17 CT chest with anterior bowing of posterior tracheal wall suggestive of tracheomalacia, trace R pleural effusion   - MRSA PCR screen negative    - sputum culture negative    - s/p extubation 1/19   - 1/20 Bcx negative    - s/p ceftriaxone 1/17-1/21   - s/p tamiflu 1/17-1/21    Sepsis/sirs, AHRF possible flash pulmonary edema iso uncontrolled HTN, c/f aspiration   - 1/23 s/p RRT am for hypoxia/inc WOB, febrile last night 100.6F and now 101F this am, tachycardia, leukocytosis 11k   - CXR with pulm vascular congestion, no consolidation noted    - s/p bumex, started AVAPS   - s/p zosyn for possible aspiration pneumonia, then started on cefepime   - repeat COVID/Flu/RSV with known influenza A-likely shedding    - reported occ dysuria, UA negative for pyuria   - 1/23 afternoon s/p RRT for inc WOB on AVAPS, s/p intubation and transfer to MICU, required pressor support  - MRSA PCR screen negative   - 1/24 afebrile overnight, WBC uptrending, weaned off pressor earlier in am, on zosyn   - h/o PCN allergy noted with hives - s/p zosyn 1/23 am and now on since 1/23 pm-appears to be tolerating       Recommendations:   Follow 1/23 Bcx, in process   Send tracheal aspirate culture  Continue empiric zosyn   -if develops rash/rxn--change to cefepime   Diuresis as tolerated, on bumex ggt   Monitor temps/WBC  Aspiration precautions   Continue rest of care per primary team     Over the weekend Dr. Micaela Jurado will be covering for our group. If you have any questions, concerns or new micro data please reach out to them using TEAMS *PREFERRED* or by calling our service at [phone].     Greyson Mart M.D.  [facility]  Available on [employer] TEAMS - *PREFERRED*  [phone]  After 5pm on weekdays and all day on weekends - please call [phone]     Thank you for consulting us and involving us in the management of this patients case. In addition to reviewing history, imaging, documents, labs, microbiology, took into account antibiotic stewardship, local antibiogram and infection control strategies and potential transmission issues.

## 2025-01-24 NOTE — PROGRESS NOTE ADULT - SUBJECTIVE AND OBJECTIVE BOX
Date of Service: 01-24-25 @ 15:13    Patient is a 69y old  Male who presents with a chief complaint of seizure, flu, elevated trop (24 Jan 2025 13:55)      Any change in ROS:   Intubated, sedated     MEDICATIONS  (STANDING):  albuterol/ipratropium for Nebulization 3 milliLiter(s) Nebulizer every 6 hours  atorvastatin 20 milliGRAM(s) Oral at bedtime  bacitracin   Ointment 1 Application(s) Topical two times a day  buMETAnide Infusion 4 mG/Hr (20 mL/Hr) IV Continuous <Continuous>  chlorhexidine 0.12% Liquid 15 milliLiter(s) Oral Mucosa every 12 hours  chlorhexidine 2% Cloths 1 Application(s) Topical <User Schedule>  chlorhexidine 2% Cloths 1 Application(s) Topical <User Schedule>  dexMEDEtomidine Infusion 0.5 MICROgram(s)/kG/Hr (12.9 mL/Hr) IV Continuous <Continuous>  escitalopram 15 milliGRAM(s) Oral daily  gabapentin Solution 300 milliGRAM(s) Oral two times a day  heparin   Injectable 7500 Unit(s) SubCutaneous every 12 hours  influenza  Vaccine (HIGH DOSE) 0.5 milliLiter(s) IntraMuscular once  lacosamide IVPB 100 milliGRAM(s) IV Intermittent every 12 hours  lamoTRIgine 100 milliGRAM(s) Oral two times a day  levETIRAcetam  IVPB 1500 milliGRAM(s) IV Intermittent two times a day  lidocaine   4% Patch 1 Patch Transdermal daily  lurasidone 40 milliGRAM(s) Oral daily  norepinephrine Infusion 0.05 MICROgram(s)/kG/Min (9.68 mL/Hr) IV Continuous <Continuous>  pantoprazole  Injectable 40 milliGRAM(s) IV Push daily  piperacillin/tazobactam IVPB.. 3.375 Gram(s) IV Intermittent every 8 hours  polyethylene glycol 3350 17 Gram(s) Oral daily  propofol Infusion 50 MICROgram(s)/kG/Min (31 mL/Hr) IV Continuous <Continuous>  senna Syrup 10 milliLiter(s) Oral at bedtime  sodium chloride 3%  Inhalation 4 milliLiter(s) Inhalation every 12 hours    MEDICATIONS  (PRN):  fentaNYL    Injectable 25 MICROGram(s) IV Push every 4 hours PRN Moderate Pain (4 - 6)  nystatin Powder 1 Application(s) Topical three times a day PRN fungal infection you may see and want to treat    Vital Signs Last 24 Hrs  T(C): 37 (24 Jan 2025 12:00), Max: 38.8 (23 Jan 2025 17:00)  T(F): 98.6 (24 Jan 2025 12:00), Max: 101.8 (23 Jan 2025 17:00)  HR: 93 (24 Jan 2025 13:45) (5 - 110)  BP: 87/54 (24 Jan 2025 13:45) (78/50 - 134/85)  BP(mean): 65 (24 Jan 2025 13:45) (55 - 104)  RR: 24 (24 Jan 2025 13:45) (14 - 36)  SpO2: 97% (24 Jan 2025 13:45) (91% - 98%)    Parameters below as of 24 Jan 2025 12:03  Patient On (Oxygen Delivery Method): ventilator      Mode: AC/ CMV (Assist Control/ Continuous Mandatory Ventilation)  RR (machine): 24  TV (machine): 450  FiO2: 80  PEEP: 10  ITime: 1  MAP: 15  PIP: 26    I&O's Summary    23 Jan 2025 07:01  -  24 Jan 2025 07:00  --------------------------------------------------------  IN: 1219.5 mL / OUT: 875 mL / NET: 344.5 mL    24 Jan 2025 07:01  -  24 Jan 2025 15:13  --------------------------------------------------------  IN: 305.7 mL / OUT: 700 mL / NET: -394.3 mL          Physical Exam:   GENERAL: NAD, well-groomed, well-developed  HEENT: BREE/   Atraumatic, Normocephalic  ENMT: No tonsillar erythema, exudates, or enlargement; Moist mucous membranes, Good dentition, No lesions  NECK: Supple, No JVD, Normal thyroid  CHEST/LUNG: Decreased BS  CVS: Regular rate and rhythm; No murmurs, rubs, or gallops  GI: : Soft, Nontender, Nondistended; Bowel sounds present  NERVOUS SYSTEM:  Sedated   EXTREMITIES:  2+ Peripheral Pulses, No clubbing, cyanosis, or edema  LYMPH: No lymphadenopathy noted  SKIN: No rashes or lesions  ENDOCRINOLOGY: No Thyromegaly  PSYCH: Appropriate    Labs:  ABG - ( 23 Jan 2025 23:58 )  pH, Arterial: 7.30  pH, Blood: x     /  pCO2: 38    /  pO2: 159   / HCO3: 19    / Base Excess: -7.1  /  SaO2: 97.7            100                            11.2   16.33 )-----------( 109      ( 24 Jan 2025 00:03 )             34.9                         12.0   13.21 )-----------( 135      ( 23 Jan 2025 15:10 )             37.8                         12.4   11.18 )-----------( 123      ( 23 Jan 2025 07:38 )             38.4                         10.3   6.42  )-----------( 106      ( 23 Jan 2025 00:39 )             33.1                         10.4   4.89  )-----------( 114      ( 22 Jan 2025 07:28 )             33.4                         10.6   6.58  )-----------( 103      ( 21 Jan 2025 00:43 )             33.1     01-24    140  |  102  |  62[H]  ----------------------------<  138[H]  4.2   |  18[L]  |  3.61[H]  01-24    138  |  105  |  50[H]  ----------------------------<  173[H]  4.8   |  17[L]  |  2.95[H]  01-23    139  |  106  |  45[H]  ----------------------------<  143[H]  5.2   |  17[L]  |  2.69[H]  01-23    138  |  104  |  38[H]  ----------------------------<  157[H]  4.8   |  19[L]  |  2.09[H]  01-23    139  |  105  |  38[H]  ----------------------------<  114[H]  4.3   |  20[L]  |  2.08[H]  01-22    142  |  109[H]  |  38[H]  ----------------------------<  112[H]  4.4   |  20[L]  |  2.23[H]  01-21    140  |  106  |  40[H]  ----------------------------<  123[H]  4.0   |  19[L]  |  2.53[H]    Ca    9.2      24 Jan 2025 10:29  Ca    9.3      24 Jan 2025 00:03  Ca    9.7      23 Jan 2025 18:16  Ca    9.7      23 Jan 2025 07:38  Ca    8.9      23 Jan 2025 00:39  Phos  6.7     01-24  Phos  5.5     01-24  Phos  4.7     01-23  Phos  3.5     01-23  Mg     2.5     01-24  Mg     2.4     01-24  Mg     2.2     01-23  Mg     2.2     01-23    TPro  6.3  /  Alb  3.0[L]  /  TBili  0.6  /  DBili  x   /  AST  71[H]  /  ALT  113[H]  /  AlkPhos  129[H]  01-24  TPro  6.2  /  Alb  3.1[L]  /  TBili  0.6  /  DBili  x   /  AST  86[H]  /  ALT  131[H]  /  AlkPhos  140[H]  01-24  TPro  6.6  /  Alb  3.4  /  TBili  0.8  /  DBili  x   /  AST  117[H]  /  ALT  156[H]  /  AlkPhos  162[H]  01-23  TPro  6.8  /  Alb  3.7  /  TBili  0.7  /  DBili  x   /  AST  149[H]  /  ALT  170[H]  /  AlkPhos  194[H]  01-23  TPro  6.1  /  Alb  3.5  /  TBili  0.4  /  DBili  x   /  AST  139[H]  /  ALT  142[H]  /  AlkPhos  172[H]  01-23  TPro  6.1  /  Alb  3.4  /  TBili  0.4  /  DBili  x   /  AST  96[H]  /  ALT  97[H]  /  AlkPhos  139[H]  01-22    CAPILLARY BLOOD GLUCOSE          LIVER FUNCTIONS - ( 24 Jan 2025 10:29 )  Alb: 3.0 g/dL / Pro: 6.3 g/dL / ALK PHOS: 129 U/L / ALT: 113 U/L / AST: 71 U/L / GGT: x           PT/INR - ( 24 Jan 2025 00:03 )   PT: 11.7 sec;   INR: 1.02 ratio         PTT - ( 24 Jan 2025 00:03 )  PTT:31.6 sec  Urinalysis Basic - ( 24 Jan 2025 10:29 )    Color: x / Appearance: x / SG: x / pH: x  Gluc: 138 mg/dL / Ketone: x  / Bili: x / Urobili: x   Blood: x / Protein: x / Nitrite: x   Leuk Esterase: x / RBC: x / WBC x   Sq Epi: x / Non Sq Epi: x / Bacteria: x      Procalcitonin: 4.61 ng/mL (01-23 @ 15:10)  Procalcitonin: 0.86 ng/mL (01-23 @ 07:38)  Lactate, Blood: 0.9 mmol/L (01-23 @ 00:39)        RECENT CULTURES:  01-23 @ 00:15 .Blood BLOOD                No growth at 24 hours    01-23 @ 00:10 .Blood BLOOD                No growth at 24 hours    01-20 @ 01:52 .Blood BLOOD                No growth at 4 days    01-19 @ 19:28 .Blood BLOOD                No growth at 4 days    01-18 @ 00:36 .Sputum Sputum       Moderate polymorphonuclear leukocytes per low power field  No Squamous epithelial cells per low power field  No organisms seen per oil power field           No growth    Studies  < from: Xray Chest 1 View- PORTABLE-Urgent (Xray Chest 1 View- PORTABLE-Urgent .) (01.23.25 @ 21:38) >      INTERPRETATION:  HISTORY: OG tube    TECHNIQUE: 2 portable AP views of the chest..    COMPARISON: 1/23/2025 at 3:37 PM    FINDINGS: The cardiac silhouette is normal in size. There is a right   chest wall Mediport. Endotracheal tube, unchanged. There is an OGT with   tip in the distal stomach. There is unchanged pulmonary vascular   congestion.    IMPRESSION: OGT in good position with tip in the distal stomach    --- End of Report ---        < end of copied text >

## 2025-01-24 NOTE — PROGRESS NOTE ADULT - SUBJECTIVE AND OBJECTIVE BOX
CHIEF COMPLAINT: Patient is a 69y old  Male who presents with a chief complaint of seizure, flu, elevated trop (23 Jan 2025 17:13)    Interval Events:    REVIEW OF SYSTEMS:    CONSTITUTIONAL:  No weakness, fevers or chills  EYES/ENT:  No visual changes;  No vertigo or throat pain   NECK:  No pain or stiffness  RESPIRATORY:  No cough, wheezing, hemoptysis; No shortness of breath  CARDIOVASCULAR:  No chest pain or palpitations  GASTROINTESTINAL:  No abdominal or epigastric pain. No nausea, vomiting, or hematemesis; No diarrhea or constipation. No melena or hematochezia.  GENITOURINARY:  No dysuria, frequency or hematuria  MUSCULOSKELETAL:  FROM all extremities, normal strength, No calf tenderness  NEUROLOGICAL:  No numbness or weakness  SKIN:  No itching, rashes  [ ] All other systems negative  [X] Unable to assess ROS because I/S    OBJECTIVE:  ICU Vital Signs Last 24 Hrs  T(C): 37.5 (24 Jan 2025 04:00), Max: 39.3 (23 Jan 2025 12:23)  T(F): 99.5 (24 Jan 2025 04:00), Max: 102.7 (23 Jan 2025 12:23)  HR: 96 (24 Jan 2025 06:45) (75 - 147)  BP: 91/58 (24 Jan 2025 06:45) (78/50 - 206/100)  BP(mean): 69 (24 Jan 2025 06:45) (55 - 143)  ABP: --  ABP(mean): --  RR: 24 (24 Jan 2025 06:45) (18 - 48)  SpO2: 97% (24 Jan 2025 06:45) (90% - 98%)    O2 Parameters below as of 24 Jan 2025 06:37  Patient On (Oxygen Delivery Method): ventilator            01-23 @ 07:01  -  01-24 @ 07:00  --------------------------------------------------------  IN: 1219.5 mL / OUT: 775 mL / NET: 444.5 mL      Mode: AC/ CMV (Assist Control/ Continuous Mandatory Ventilation), RR (machine): 24, TV (machine): 450, FiO2: 80, PEEP: 10, ITime: 1, MAP: 14, PIP: 18  Drips  CAPILLARY BLOOD GLUCOSE      POCT Blood Glucose.: 244 mg/dL (23 Jan 2025 13:23)          PHYSICAL EXAM:  General: NAD, doing well.  HEENT: [X] Intubated. [X] OG in place. Brainstem reflexes in place  Lymph Nodes: No FREDDY palpated  Neck: trachea midline. no trach.   Respiratory: CTA b/l. No rales, rhonchi, wheezing appreciated.  Cardiovascular: RRR. +s1/s2, -s3/s4. No murmur, rubs, gallops. No edema  Abdomen: NT/ND. +BS, No HSM.   Extremities: 2+ pulses  Skin: [ ] edematous. No rashes, ecchymoses, or petechiae noted  Neurological: AAOx3. DTR 2+. strength 5/5 UE/LE bilaterally.   Psychiatry: Appropriate mood and affect.    LABS:  (01-24 @ 00:03)                        11.2  16.33 )-----------( 109                 34.9    Neutrophils = 14.70 (90.0%)  Lymphocytes = 0.78 (4.8%)  Eosinophils = 0.00 (0.0%)  Basophils = 0.04 (0.2%)  Monocytes = 0.73 (4.5%)  Bands = --%    WBC Trend: 16.33<--, 13.21<--, 11.18<--  Hb Trend: 11.2<--, 12.0<--, 12.4<--, 10.3<--, 10.4<--  Plt Trend: 109<--, 135<--, 123<--, 106<--, 114<--  01-24    138  |  105  |  50[H]  ----------------------------<  173[H]  4.8   |  17[L]  |  2.95[H]    Ca    9.3      24 Jan 2025 00:03  Phos  5.5     01-24  Mg     2.4     01-24    TPro  6.2  /  Alb  3.1[L]  /  TBili  0.6  /  DBili  x   /  AST  86[H]  /  ALT  131[H]  /  AlkPhos  140[H]  01-24    Creatinine Trend: 2.95<--, 2.69<--, 2.09<--, 2.08<--, 2.23<--, 2.53<--  PT/INR - ( 24 Jan 2025 00:03 )   PT: 11.7 sec;   INR: 1.02 ratio         PTT - ( 24 Jan 2025 00:03 )  PTT:31.6 sec  Urinalysis Basic - ( 24 Jan 2025 00:03 )    Color: x / Appearance: x / SG: x / pH: x  Gluc: 173 mg/dL / Ketone: x  / Bili: x / Urobili: x   Blood: x / Protein: x / Nitrite: x   Leuk Esterase: x / RBC: x / WBC x   Sq Epi: x / Non Sq Epi: x / Bacteria: x      Arterial Blood Gas:  01-23 @ 23:58  7.30/38/159/19/97.7/-7.1  ABG lactate: --  Arterial Blood Gas:  01-23 @ 18:03  7.32/35/132/18/97.7/-7.3  ABG lactate: --  Arterial Blood Gas:  01-23 @ 14:55  7.30/38/85/19/95.1/-7.1  ABG lactate: --  Arterial Blood Gas:  01-23 @ 06:57  7.35/37/58/20/89.0/-4.6  ABG lactate: --    Venous Blood Gas:  01-23 @ 18:26  7.23/51/35/21/45.4  VBG Lactate: 2.2          MICROBIOLOGY:   Blood Cx:  Urine Cx:  Sputum Cx:  Legionella:  RVP:    HOSPITAL MEDICATIONS:  MEDICATIONS  (STANDING):  albuterol/ipratropium for Nebulization 3 milliLiter(s) Nebulizer every 6 hours  atorvastatin 20 milliGRAM(s) Oral at bedtime  bacitracin   Ointment 1 Application(s) Topical two times a day  buMETAnide Infusion 4 mG/Hr (20 mL/Hr) IV Continuous <Continuous>  chlorhexidine 0.12% Liquid 15 milliLiter(s) Oral Mucosa every 12 hours  chlorhexidine 2% Cloths 1 Application(s) Topical <User Schedule>  chlorhexidine 2% Cloths 1 Application(s) Topical <User Schedule>  dexMEDEtomidine Infusion 0.5 MICROgram(s)/kG/Hr (12.9 mL/Hr) IV Continuous <Continuous>  escitalopram 15 milliGRAM(s) Oral daily  gabapentin Solution 300 milliGRAM(s) Oral two times a day  heparin   Injectable 7500 Unit(s) SubCutaneous every 12 hours  influenza  Vaccine (HIGH DOSE) 0.5 milliLiter(s) IntraMuscular once  lacosamide IVPB 100 milliGRAM(s) IV Intermittent every 12 hours  lamoTRIgine 100 milliGRAM(s) Oral two times a day  levETIRAcetam  IVPB 1500 milliGRAM(s) IV Intermittent two times a day  lidocaine   4% Patch 1 Patch Transdermal daily  lurasidone 40 milliGRAM(s) Oral daily  norepinephrine Infusion 0.05 MICROgram(s)/kG/Min (9.68 mL/Hr) IV Continuous <Continuous>  piperacillin/tazobactam IVPB.. 3.375 Gram(s) IV Intermittent every 8 hours  polyethylene glycol 3350 17 Gram(s) Oral daily  propofol Infusion 50 MICROgram(s)/kG/Min (31 mL/Hr) IV Continuous <Continuous>  senna Syrup 10 milliLiter(s) Oral at bedtime  sodium chloride 3%  Inhalation 4 milliLiter(s) Inhalation every 12 hours    MEDICATIONS  (PRN):  fentaNYL    Injectable 25 MICROGram(s) IV Push every 4 hours PRN Moderate Pain (4 - 6)  nystatin Powder 1 Application(s) Topical three times a day PRN fungal infection you may see and want to treat      RADIOLOGY:  X Ray:  CT:  MRI:  Ultrasound:  [ ] Reviewed and interpreted by me    EKG:   CHIEF COMPLAINT: Patient is a 69y old  Male who presents with a chief complaint of seizure, flu, elevated trop (23 Jan 2025 17:13)    Interval Events: Patient was not urinating much and was started on thiazide and bumex drip 4mg with improvement in urine output. He also has neck mass and US order placed. No other events.     REVIEW OF SYSTEMS: Intubated, Sedated    OBJECTIVE:  ICU Vital Signs Last 24 Hrs  T(C): 37.5 (24 Jan 2025 04:00), Max: 39.3 (23 Jan 2025 12:23)  T(F): 99.5 (24 Jan 2025 04:00), Max: 102.7 (23 Jan 2025 12:23)  HR: 96 (24 Jan 2025 06:45) (75 - 147)  BP: 91/58 (24 Jan 2025 06:45) (78/50 - 206/100)  BP(mean): 69 (24 Jan 2025 06:45) (55 - 143)  RR: 24 (24 Jan 2025 06:45) (18 - 48)  SpO2: 97% (24 Jan 2025 06:45) (90% - 98%)    O2 Parameters below as of 24 Jan 2025 06:37  Patient On (Oxygen Delivery Method): ventilator    01-23 @ 07:01  -  01-24 @ 07:00  --------------------------------------------------------  IN: 1219.5 mL / OUT: 775 mL / NET: 444.5 mL    Mode: AC/ CMV (Assist Control/ Continuous Mandatory Ventilation), RR (machine): 24, TV (machine): 450, FiO2: 80, PEEP: 10, ITime: 1, MAP: 14, PIP: 18  POCT Blood Glucose.: 244 mg/dL (23 Jan 2025 13:23)    PHYSICAL EXAM  ***    LABS:  (01-24 @ 00:03)                        11.2  16.33 )-----------( 109                 34.9    Neutrophils = 14.70 (90.0%)  Lymphocytes = 0.78 (4.8%)  Eosinophils = 0.00 (0.0%)  Basophils = 0.04 (0.2%)  Monocytes = 0.73 (4.5%)  Bands = --%    WBC Trend: 16.33<--, 13.21<--, 11.18<--  Hb Trend: 11.2<--, 12.0<--, 12.4<--, 10.3<--, 10.4<--  Plt Trend: 109<--, 135<--, 123<--, 106<--, 114<--  01-24    138  |  105  |  50[H]  ----------------------------<  173[H]  4.8   |  17[L]  |  2.95[H]    Ca    9.3      24 Jan 2025 00:03  Phos  5.5     01-24  Mg     2.4     01-24    TPro  6.2  /  Alb  3.1[L]  /  TBili  0.6  /  DBili  x   /  AST  86[H]  /  ALT  131[H]  /  AlkPhos  140[H]  01-24    Creatinine Trend: 2.95<--, 2.69<--, 2.09<--, 2.08<--, 2.23<--, 2.53<--  PT/INR - ( 24 Jan 2025 00:03 )   PT: 11.7 sec;   INR: 1.02 ratio         PTT - ( 24 Jan 2025 00:03 )  PTT:31.6 sec  Urinalysis Basic - ( 24 Jan 2025 00:03 )    Color: x / Appearance: x / SG: x / pH: x  Gluc: 173 mg/dL / Ketone: x  / Bili: x / Urobili: x   Blood: x / Protein: x / Nitrite: x   Leuk Esterase: x / RBC: x / WBC x   Sq Epi: x / Non Sq Epi: x / Bacteria: x      Arterial Blood Gas:  01-23 @ 23:58  7.30/38/159/19/97.7/-7.1  ABG lactate: --  Arterial Blood Gas:  01-23 @ 18:03  7.32/35/132/18/97.7/-7.3  ABG lactate: --  Arterial Blood Gas:  01-23 @ 14:55  7.30/38/85/19/95.1/-7.1  ABG lactate: --  Arterial Blood Gas:  01-23 @ 06:57  7.35/37/58/20/89.0/-4.6  ABG lactate: --    Venous Blood Gas:  01-23 @ 18:26  7.23/51/35/21/45.4  VBG Lactate: 2.2          MICROBIOLOGY:   Blood Cx:  Urine Cx:  Sputum Cx:  Legionella:  RVP:    HOSPITAL MEDICATIONS:  MEDICATIONS  (STANDING):  albuterol/ipratropium for Nebulization 3 milliLiter(s) Nebulizer every 6 hours  atorvastatin 20 milliGRAM(s) Oral at bedtime  bacitracin   Ointment 1 Application(s) Topical two times a day  buMETAnide Infusion 4 mG/Hr (20 mL/Hr) IV Continuous <Continuous>  chlorhexidine 0.12% Liquid 15 milliLiter(s) Oral Mucosa every 12 hours  chlorhexidine 2% Cloths 1 Application(s) Topical <User Schedule>  chlorhexidine 2% Cloths 1 Application(s) Topical <User Schedule>  dexMEDEtomidine Infusion 0.5 MICROgram(s)/kG/Hr (12.9 mL/Hr) IV Continuous <Continuous>  escitalopram 15 milliGRAM(s) Oral daily  gabapentin Solution 300 milliGRAM(s) Oral two times a day  heparin   Injectable 7500 Unit(s) SubCutaneous every 12 hours  influenza  Vaccine (HIGH DOSE) 0.5 milliLiter(s) IntraMuscular once  lacosamide IVPB 100 milliGRAM(s) IV Intermittent every 12 hours  lamoTRIgine 100 milliGRAM(s) Oral two times a day  levETIRAcetam  IVPB 1500 milliGRAM(s) IV Intermittent two times a day  lidocaine   4% Patch 1 Patch Transdermal daily  lurasidone 40 milliGRAM(s) Oral daily  norepinephrine Infusion 0.05 MICROgram(s)/kG/Min (9.68 mL/Hr) IV Continuous <Continuous>  piperacillin/tazobactam IVPB.. 3.375 Gram(s) IV Intermittent every 8 hours  polyethylene glycol 3350 17 Gram(s) Oral daily  propofol Infusion 50 MICROgram(s)/kG/Min (31 mL/Hr) IV Continuous <Continuous>  senna Syrup 10 milliLiter(s) Oral at bedtime  sodium chloride 3%  Inhalation 4 milliLiter(s) Inhalation every 12 hours    MEDICATIONS  (PRN):  fentaNYL    Injectable 25 MICROGram(s) IV Push every 4 hours PRN Moderate Pain (4 - 6)  nystatin Powder 1 Application(s) Topical three times a day PRN fungal infection you may see and want to treat      RADIOLOGY:  X Ray:  CT:  MRI:  Ultrasound:  [ ] Reviewed and interpreted by me    EKG:   CHIEF COMPLAINT: Patient is a 69y old  Male who presents with a chief complaint of seizure, flu, elevated trop (23 Jan 2025 17:13)    Interval Events: Patient was not urinating much and was started on thiazide and bumex drip 4mg with improvement in urine output. He also has neck mass, so US ordered and pending. No other acute events.     REVIEW OF SYSTEMS: Intubated, Sedated    OBJECTIVE:  ICU Vital Signs Last 24 Hrs  T(C): 37.5 (24 Jan 2025 04:00), Max: 39.3 (23 Jan 2025 12:23)  T(F): 99.5 (24 Jan 2025 04:00), Max: 102.7 (23 Jan 2025 12:23)  HR: 96 (24 Jan 2025 06:45) (75 - 147)  BP: 91/58 (24 Jan 2025 06:45) (78/50 - 206/100)  BP(mean): 69 (24 Jan 2025 06:45) (55 - 143)  RR: 24 (24 Jan 2025 06:45) (18 - 48)  SpO2: 97% (24 Jan 2025 06:45) (90% - 98%)    O2 Parameters below as of 24 Jan 2025 06:37  Patient On (Oxygen Delivery Method): ventilator    01-23 @ 07:01  -  01-24 @ 07:00  --------------------------------------------------------  IN: 1219.5 mL / OUT: 775 mL / NET: 444.5 mL    Mode: AC/ CMV (Assist Control/ Continuous Mandatory Ventilation), RR (machine): 24, TV (machine): 450, FiO2: 80, PEEP: 10, ITime: 1, MAP: 14, PIP: 18  POCT Blood Glucose.: 244 mg/dL (23 Jan 2025 13:23)    PHYSICAL EXAM  ***    LABS:  (01-24 @ 00:03)                        11.2  16.33 )-----------( 109                 34.9    Neutrophils = 14.70 (90.0%)  Lymphocytes = 0.78 (4.8%)  Eosinophils = 0.00 (0.0%)  Basophils = 0.04 (0.2%)  Monocytes = 0.73 (4.5%)  Bands = --%    WBC Trend: 16.33<--, 13.21<--, 11.18<--  Hb Trend: 11.2<--, 12.0<--, 12.4<--, 10.3<--, 10.4<--  Plt Trend: 109<--, 135<--, 123<--, 106<--, 114<--  01-24    138  |  105  |  50[H]  ----------------------------<  173[H]  4.8   |  17[L]  |  2.95[H]    Ca    9.3      24 Jan 2025 00:03  Phos  5.5     01-24  Mg     2.4     01-24    TPro  6.2  /  Alb  3.1[L]  /  TBili  0.6  /  DBili  x   /  AST  86[H]  /  ALT  131[H]  /  AlkPhos  140[H]  01-24  Creatinine Trend: 2.95<--, 2.69<--, 2.09<--, 2.08<--, 2.23<--, 2.53<--  PT/INR - ( 24 Jan 2025 00:03 )   PT: 11.7 sec;   INR: 1.02 ratio    PTT - ( 24 Jan 2025 00:03 )  PTT:31.6 sec  Urinalysis Basic - ( 24 Jan 2025 00:03 )  Color: x / Appearance: x / SG: x / pH: x  Gluc: 173 mg/dL / Ketone: x  / Bili: x / Urobili: x   Blood: x / Protein: x / Nitrite: x   Leuk Esterase: x / RBC: x / WBC x   Sq Epi: x / Non Sq Epi: x / Bacteria: x    Arterial Blood Gas:  01-23 @ 23:58  7.30/38/159/19/97.7/-7.1  ABG lactate: --  Arterial Blood Gas:  01-23 @ 18:03  7.32/35/132/18/97.7/-7.3  ABG lactate: --  Arterial Blood Gas:  01-23 @ 14:55  7.30/38/85/19/95.1/-7.1  ABG lactate: --  Arterial Blood Gas:  01-23 @ 06:57  7.35/37/58/20/89.0/-4.6  ABG lactate: --    Venous Blood Gas:  01-23 @ 18:26  7.23/51/35/21/45.4  VBG Lactate: 2.2    HOSPITAL MEDICATIONS:  MEDICATIONS  (STANDING):  albuterol/ipratropium for Nebulization 3 milliLiter(s) Nebulizer every 6 hours  atorvastatin 20 milliGRAM(s) Oral at bedtime  bacitracin   Ointment 1 Application(s) Topical two times a day  buMETAnide Infusion 4 mG/Hr (20 mL/Hr) IV Continuous <Continuous>  chlorhexidine 0.12% Liquid 15 milliLiter(s) Oral Mucosa every 12 hours  chlorhexidine 2% Cloths 1 Application(s) Topical <User Schedule>  chlorhexidine 2% Cloths 1 Application(s) Topical <User Schedule>  dexMEDEtomidine Infusion 0.5 MICROgram(s)/kG/Hr (12.9 mL/Hr) IV Continuous <Continuous>  escitalopram 15 milliGRAM(s) Oral daily  gabapentin Solution 300 milliGRAM(s) Oral two times a day  heparin   Injectable 7500 Unit(s) SubCutaneous every 12 hours  influenza  Vaccine (HIGH DOSE) 0.5 milliLiter(s) IntraMuscular once  lacosamide IVPB 100 milliGRAM(s) IV Intermittent every 12 hours  lamoTRIgine 100 milliGRAM(s) Oral two times a day  levETIRAcetam  IVPB 1500 milliGRAM(s) IV Intermittent two times a day  lidocaine   4% Patch 1 Patch Transdermal daily  lurasidone 40 milliGRAM(s) Oral daily  norepinephrine Infusion 0.05 MICROgram(s)/kG/Min (9.68 mL/Hr) IV Continuous <Continuous>  piperacillin/tazobactam IVPB.. 3.375 Gram(s) IV Intermittent every 8 hours  polyethylene glycol 3350 17 Gram(s) Oral daily  propofol Infusion 50 MICROgram(s)/kG/Min (31 mL/Hr) IV Continuous <Continuous>  senna Syrup 10 milliLiter(s) Oral at bedtime  sodium chloride 3%  Inhalation 4 milliLiter(s) Inhalation every 12 hours    MEDICATIONS  (PRN):  fentaNYL    Injectable 25 MICROGram(s) IV Push every 4 hours PRN Moderate Pain (4 - 6)  nystatin Powder 1 Application(s) Topical three times a day PRN fungal infection you may see and want to treat CHIEF COMPLAINT: Patient is a 69y old  Male who presents with a chief complaint of seizure, flu, elevated trop (23 Jan 2025 17:13)    Interval Events: Patient was not urinating much and was started on thiazide and bumex drip 4mg with improvement in urine output. He also has neck mass, so US ordered and pending. No other acute events.     REVIEW OF SYSTEMS: Intubated, Sedated    OBJECTIVE:  ICU Vital Signs Last 24 Hrs  T(C): 37.5 (24 Jan 2025 04:00), Max: 39.3 (23 Jan 2025 12:23)  T(F): 99.5 (24 Jan 2025 04:00), Max: 102.7 (23 Jan 2025 12:23)  HR: 96 (24 Jan 2025 06:45) (75 - 147)  BP: 91/58 (24 Jan 2025 06:45) (78/50 - 206/100)  BP(mean): 69 (24 Jan 2025 06:45) (55 - 143)  RR: 24 (24 Jan 2025 06:45) (18 - 48)  SpO2: 97% (24 Jan 2025 06:45) (90% - 98%)    O2 Parameters below as of 24 Jan 2025 06:37  Patient On (Oxygen Delivery Method): ventilator    01-23 @ 07:01  -  01-24 @ 07:00  --------------------------------------------------------  IN: 1219.5 mL / OUT: 775 mL / NET: 444.5 mL    Mode: AC/ CMV (Assist Control/ Continuous Mandatory Ventilation), RR (machine): 24, TV (machine): 450, FiO2: 80, PEEP: 10, ITime: 1, MAP: 14, PIP: 18  POCT Blood Glucose.: 244 mg/dL (23 Jan 2025 13:23)    PHYSICAL EXAM  General: Intubated, sedated, some arousal with opening eyes, no distress  HEENT: Normocephalic. Atraumatic. Normal icterus. MMM  Heart: Regular rate, rhythm.   Lungs: faint crackles, no distress  GI: Soft, distended/obese, nontender  Neuro: Arouses to stimulation, pupils appropriate   Ext: Edema bilaterally.   Skin: Warm, well-perfused     LABS:  (01-24 @ 00:03)                        11.2  16.33 )-----------( 109                 34.9    Neutrophils = 14.70 (90.0%)  Lymphocytes = 0.78 (4.8%)  Eosinophils = 0.00 (0.0%)  Basophils = 0.04 (0.2%)  Monocytes = 0.73 (4.5%)  Bands = --%    WBC Trend: 16.33<--, 13.21<--, 11.18<--  Hb Trend: 11.2<--, 12.0<--, 12.4<--, 10.3<--, 10.4<--  Plt Trend: 109<--, 135<--, 123<--, 106<--, 114<--  01-24    138  |  105  |  50[H]  ----------------------------<  173[H]  4.8   |  17[L]  |  2.95[H]    Ca    9.3      24 Jan 2025 00:03  Phos  5.5     01-24  Mg     2.4     01-24    TPro  6.2  /  Alb  3.1[L]  /  TBili  0.6  /  DBili  x   /  AST  86[H]  /  ALT  131[H]  /  AlkPhos  140[H]  01-24  Creatinine Trend: 2.95<--, 2.69<--, 2.09<--, 2.08<--, 2.23<--, 2.53<--  PT/INR - ( 24 Jan 2025 00:03 )   PT: 11.7 sec;   INR: 1.02 ratio    PTT - ( 24 Jan 2025 00:03 )  PTT:31.6 sec  Urinalysis Basic - ( 24 Jan 2025 00:03 )  Color: x / Appearance: x / SG: x / pH: x  Gluc: 173 mg/dL / Ketone: x  / Bili: x / Urobili: x   Blood: x / Protein: x / Nitrite: x   Leuk Esterase: x / RBC: x / WBC x   Sq Epi: x / Non Sq Epi: x / Bacteria: x    Arterial Blood Gas:  01-23 @ 23:58  7.30/38/159/19/97.7/-7.1  ABG lactate: --  Arterial Blood Gas:  01-23 @ 18:03  7.32/35/132/18/97.7/-7.3  ABG lactate: --  Arterial Blood Gas:  01-23 @ 14:55  7.30/38/85/19/95.1/-7.1  ABG lactate: --  Arterial Blood Gas:  01-23 @ 06:57  7.35/37/58/20/89.0/-4.6  ABG lactate: --    Venous Blood Gas:  01-23 @ 18:26  7.23/51/35/21/45.4  VBG Lactate: 2.2    HOSPITAL MEDICATIONS:  MEDICATIONS  (STANDING):  albuterol/ipratropium for Nebulization 3 milliLiter(s) Nebulizer every 6 hours  atorvastatin 20 milliGRAM(s) Oral at bedtime  bacitracin   Ointment 1 Application(s) Topical two times a day  buMETAnide Infusion 4 mG/Hr (20 mL/Hr) IV Continuous <Continuous>  chlorhexidine 0.12% Liquid 15 milliLiter(s) Oral Mucosa every 12 hours  chlorhexidine 2% Cloths 1 Application(s) Topical <User Schedule>  chlorhexidine 2% Cloths 1 Application(s) Topical <User Schedule>  dexMEDEtomidine Infusion 0.5 MICROgram(s)/kG/Hr (12.9 mL/Hr) IV Continuous <Continuous>  escitalopram 15 milliGRAM(s) Oral daily  gabapentin Solution 300 milliGRAM(s) Oral two times a day  heparin   Injectable 7500 Unit(s) SubCutaneous every 12 hours  influenza  Vaccine (HIGH DOSE) 0.5 milliLiter(s) IntraMuscular once  lacosamide IVPB 100 milliGRAM(s) IV Intermittent every 12 hours  lamoTRIgine 100 milliGRAM(s) Oral two times a day  levETIRAcetam  IVPB 1500 milliGRAM(s) IV Intermittent two times a day  lidocaine   4% Patch 1 Patch Transdermal daily  lurasidone 40 milliGRAM(s) Oral daily  norepinephrine Infusion 0.05 MICROgram(s)/kG/Min (9.68 mL/Hr) IV Continuous <Continuous>  piperacillin/tazobactam IVPB.. 3.375 Gram(s) IV Intermittent every 8 hours  polyethylene glycol 3350 17 Gram(s) Oral daily  propofol Infusion 50 MICROgram(s)/kG/Min (31 mL/Hr) IV Continuous <Continuous>  senna Syrup 10 milliLiter(s) Oral at bedtime  sodium chloride 3%  Inhalation 4 milliLiter(s) Inhalation every 12 hours    MEDICATIONS  (PRN):  fentaNYL    Injectable 25 MICROGram(s) IV Push every 4 hours PRN Moderate Pain (4 - 6)  nystatin Powder 1 Application(s) Topical three times a day PRN fungal infection you may see and want to treat

## 2025-01-24 NOTE — PROGRESS NOTE ADULT - ASSESSMENT
70 y/o M with PMH of metastatic testicular cancer (s/p L orchiectomy, on etoposide/cisplatin chemo, last dose 6/2024), epilepsy (grand-mal seizures), schizophrenia, developmental delay, HTN, HLD, VAZQUEZ, stage III CKD, severe obesity, secondary hyperparathyroidism, anemia, thrombocytopenia who presented to ED for fall, possible seizure prior. On 1/17 patient had multiple RRTs called for grand mal seizure activity and  patient had x2 more episode and was ultimately given ativan 2mg, Vimpat load 200mg, and intubated for airway protection with MICU transfer. Extubated 1/19 - to AVAPS, post extubation with increased secretion. Downgraded to medical floor 1/21. Pt still with fevers, s/p RRT 1/23 AM for fever, hypoxia.  AVAPS - O2 sats 98% but tachypneic to 40, tachycardic, pt endorsing SOB. RRT ---> intubated for respiratory failure. also febrile and Hypertensive. CKD      1- CKD III  with SHAGUFTA   2- CHF   3- fevers  4- tachycardia  5- respiratory failure  6- HTN     cr worsening  suspect ischemic ATN in setting of infection and hypotension   diurese bumex 4mg hr trend cr  and fluid status   zosyn 3,375 g iv q8 change to Q12 given shagufta   broad spectrum antibiotic   vent support  bilateral upper extremity Passive ROM was WFL (within functional limits)/bilateral lower extremity Passive ROM was WFL (within functional limits)

## 2025-01-24 NOTE — PROGRESS NOTE ADULT - ASSESSMENT
Syncope  Influenza A infection  Epilepsy, intractable, without status epilepticus  Metastatic testicular cancer  VAZQUEZ  Schizophrenia  HTN  SHAGUFTA  Transaminitis  Bicytopenia  Sepsis    - Patient with syncopal event in the setting of influenza A infection, likely from toxic/metabolic or peripheral process with acute medical issues but could also be cardiac. Seizure cannot be entirely excluded but much lower on the differential; if it did happen would favor provoked in the above setting. He is back to neurologic baseline without any recurrent events, focal neurologic deficits, or abnormal movements. CT head and CT c-spine, personally reviewed by me, with cervical degenerative changes but no acute intracranial or spinal findings. 1/18 - Multiple RRT's for seizures and unresponsiveness on 1/17, intubated and transferred to MICU. No further seizure like activity. Repeat CT head, personally reviewed by me, stable. 1/24 - Patient with worsening and respiration and mental status in the setting of sepsis. No reported seizure-like activity. Now intubated and sedated  - vEEG with moderate generalized slowing and no evidence for focal slowing, epileptiform discharges, or seizures. STOPPED  - Continue current ASM's - Keppra 1.5g PO/IV BID, lamotrigine 100mg PO BID, gabapentin 300mg PO BID, Vimpat 100mg PO/IV BID  - Above recommendations discussed with MICU team, who verbalized agreement and understanding  - Continue to address above medical problems, as you are doing  - Will continue to follow patient with you, as needed

## 2025-01-24 NOTE — PROGRESS NOTE ADULT - PROBLEM SELECTOR PLAN 1
-S/p intubation in MICU in the setting of grand mal seizures, pneumonia, Flu  -Completed course of ABX, tamiflu for Flu. Extubated to AVAPS 1/19  -S/p RRT 1/23 AM for fevers, hypoxia requiring AVAPS  -Pt hypertensive, concern for flash pulm edema  -WBC uptrending, febrile to 101F during RRT, ? aspiration event   -S/p 2nd RRT 1/23 PM for increased WOB on AVAPS, intubated and tx to MICU   -Keep O>I as tolerated  -Continue ABX  -Continue bronchodilators  -Keep sats >90%, pH >7.2.

## 2025-01-24 NOTE — PROGRESS NOTE ADULT - SUBJECTIVE AND OBJECTIVE BOX
SUBJECTIVE / OVERNIGHT EVENTS:      Remains intubated in ICU      --------------------------------------------------------------------------------------------  LABS:                        11.2   16.33 )-----------( 109      ( 24 Jan 2025 00:03 )             34.9     01-24    138  |  105  |  50[H]  ----------------------------<  173[H]  4.8   |  17[L]  |  2.95[H]    Ca    9.3      24 Jan 2025 00:03  Phos  5.5     01-24  Mg     2.4     01-24    TPro  6.2  /  Alb  3.1[L]  /  TBili  0.6  /  DBili  x   /  AST  86[H]  /  ALT  131[H]  /  AlkPhos  140[H]  01-24    PT/INR - ( 24 Jan 2025 00:03 )   PT: 11.7 sec;   INR: 1.02 ratio         PTT - ( 24 Jan 2025 00:03 )  PTT:31.6 sec  CAPILLARY BLOOD GLUCOSE      POCT Blood Glucose.: 244 mg/dL (23 Jan 2025 13:23)        Urinalysis Basic - ( 24 Jan 2025 00:03 )    Color: x / Appearance: x / SG: x / pH: x  Gluc: 173 mg/dL / Ketone: x  / Bili: x / Urobili: x   Blood: x / Protein: x / Nitrite: x   Leuk Esterase: x / RBC: x / WBC x   Sq Epi: x / Non Sq Epi: x / Bacteria: x        RADIOLOGY & ADDITIONAL TESTS: < from: Xray Chest 1 View- PORTABLE-Urgent (Xray Chest 1 View- PORTABLE-Urgent .) (01.23.25 @ 21:38) >  FINDINGS: The cardiac silhouette is normal in size. There is a right   chest wall Mediport. Endotracheal tube, unchanged. There is an OGT with   tip in the distal stomach. There is unchanged pulmonary vascular   congestion.    IMPRESSION: OGT in good position with tip in the distal stomach    < end of copied text >  < from: [facility] Duplex Lower Ext Vein Scan, Bilat (01.23.25 @ 17:37) >  IMPRESSION:  No evidence of deep venous thrombosis in either lower extremity.    < end of copied text >  < from: Xray Chest 1 View- PORTABLE-Urgent (Xray Chest 1 View- PORTABLE-Urgent .) (01.23.25 @ 15:56) >  IMPRESSION:  Support devices as described above.  Right middle lobe opacity, may represent infection in the appropriate   clinical setting.    < end of copied text >      Imaging Personally Reviewed:  [x] YES  [ ] NO    Consultant(s) Notes Reviewed:  [x] YES  [ ] NO    MEDICATIONS  (STANDING):  albuterol/ipratropium for Nebulization 3 milliLiter(s) Nebulizer every 6 hours  atorvastatin 20 milliGRAM(s) Oral at bedtime  bacitracin   Ointment 1 Application(s) Topical two times a day  buMETAnide Infusion 4 mG/Hr (20 mL/Hr) IV Continuous <Continuous>  chlorhexidine 0.12% Liquid 15 milliLiter(s) Oral Mucosa every 12 hours  chlorhexidine 2% Cloths 1 Application(s) Topical <User Schedule>  chlorhexidine 2% Cloths 1 Application(s) Topical <User Schedule>  dexMEDEtomidine Infusion 0.5 MICROgram(s)/kG/Hr (12.9 mL/Hr) IV Continuous <Continuous>  escitalopram 15 milliGRAM(s) Oral daily  gabapentin Solution 300 milliGRAM(s) Oral two times a day  heparin   Injectable 7500 Unit(s) SubCutaneous every 12 hours  influenza  Vaccine (HIGH DOSE) 0.5 milliLiter(s) IntraMuscular once  lacosamide IVPB 100 milliGRAM(s) IV Intermittent every 12 hours  lamoTRIgine 100 milliGRAM(s) Oral two times a day  levETIRAcetam  IVPB 1500 milliGRAM(s) IV Intermittent two times a day  lidocaine   4% Patch 1 Patch Transdermal daily  lurasidone 40 milliGRAM(s) Oral daily  norepinephrine Infusion 0.05 MICROgram(s)/kG/Min (9.68 mL/Hr) IV Continuous <Continuous>  pantoprazole  Injectable 40 milliGRAM(s) IV Push daily  piperacillin/tazobactam IVPB.. 3.375 Gram(s) IV Intermittent every 8 hours  polyethylene glycol 3350 17 Gram(s) Oral daily  propofol Infusion 50 MICROgram(s)/kG/Min (31 mL/Hr) IV Continuous <Continuous>  senna Syrup 10 milliLiter(s) Oral at bedtime  sodium chloride 3%  Inhalation 4 milliLiter(s) Inhalation every 12 hours    MEDICATIONS  (PRN):  fentaNYL    Injectable 25 MICROGram(s) IV Push every 4 hours PRN Moderate Pain (4 - 6)  nystatin Powder 1 Application(s) Topical three times a day PRN fungal infection you may see and want to treat      Care Discussed with Consultants/Other Providers [x] YES  [ ] NO    Vital Signs Last 24 Hrs  T(C): 37.3 (24 Jan 2025 07:45), Max: 39.3 (23 Jan 2025 12:23)  T(F): 99.1 (24 Jan 2025 07:45), Max: 102.7 (23 Jan 2025 12:23)  HR: 97 (24 Jan 2025 10:00) (75 - 124)  BP: 112/64 (24 Jan 2025 10:00) (78/50 - 206/100)  BP(mean): 82 (24 Jan 2025 10:00) (55 - 143)  RR: 24 (24 Jan 2025 10:00) (14 - 48)  SpO2: 97% (24 Jan 2025 10:00) (90% - 98%)    Parameters below as of 24 Jan 2025 06:37  Patient On (Oxygen Delivery Method): ventilator      I&O's Summary    23 Jan 2025 07:01  -  24 Jan 2025 07:00  --------------------------------------------------------  IN: 1219.5 mL / OUT: 875 mL / NET: 344.5 mL    24 Jan 2025 07:01  -  24 Jan 2025 10:25  --------------------------------------------------------  IN: 83.4 mL / OUT: 225 mL / NET: -141.6 mL      PHYSICAL EXAM:  GENERAL: Intubated  HEAD:  + abrasion , + NGT  EYES: EOMI, PERRLA, conjunctiva and sclera clear  NECK: Supple, No JVD  CHEST/LUNG: Diminished bilaterally; No wheeze  HEART: Regular rate and rhythm; No murmurs, rubs, or gallops  ABDOMEN: Soft, Nontender, +distended; Bowel sounds present  NEURO: Intubated, no focal weakness, 5/5 b/l extremity strength, b/l knee no arthritis, no effusion   EXTREMITIES:  2+ Peripheral Pulses, No clubbing, cyanosis, or edema  SKIN: No rashes or lesions

## 2025-01-25 LAB
ALBUMIN SERPL ELPH-MCNC: 2.9 G/DL — LOW (ref 3.3–5)
ALBUMIN SERPL ELPH-MCNC: 3.1 G/DL — LOW (ref 3.3–5)
ALBUMIN SERPL ELPH-MCNC: 3.2 G/DL — LOW (ref 3.3–5)
ALP SERPL-CCNC: 140 U/L — HIGH (ref 40–120)
ALP SERPL-CCNC: 141 U/L — HIGH (ref 40–120)
ALP SERPL-CCNC: 143 U/L — HIGH (ref 40–120)
ALT FLD-CCNC: 102 U/L — HIGH (ref 10–45)
ALT FLD-CCNC: 105 U/L — HIGH (ref 10–45)
ALT FLD-CCNC: 107 U/L — HIGH (ref 10–45)
ANION GAP SERPL CALC-SCNC: 22 MMOL/L — HIGH (ref 5–17)
ANION GAP SERPL CALC-SCNC: 24 MMOL/L — HIGH (ref 5–17)
ANION GAP SERPL CALC-SCNC: 24 MMOL/L — HIGH (ref 5–17)
APTT BLD: 30.3 SEC — SIGNIFICANT CHANGE UP (ref 24.5–35.6)
AST SERPL-CCNC: 103 U/L — HIGH (ref 10–40)
AST SERPL-CCNC: 86 U/L — HIGH (ref 10–40)
AST SERPL-CCNC: 91 U/L — HIGH (ref 10–40)
BASOPHILS # BLD AUTO: 0.01 K/UL — SIGNIFICANT CHANGE UP (ref 0–0.2)
BASOPHILS NFR BLD AUTO: 0.1 % — SIGNIFICANT CHANGE UP (ref 0–2)
BILIRUB SERPL-MCNC: 0.4 MG/DL — SIGNIFICANT CHANGE UP (ref 0.2–1.2)
BILIRUB SERPL-MCNC: 0.5 MG/DL — SIGNIFICANT CHANGE UP (ref 0.2–1.2)
BILIRUB SERPL-MCNC: 0.6 MG/DL — SIGNIFICANT CHANGE UP (ref 0.2–1.2)
BUN SERPL-MCNC: 74 MG/DL — HIGH (ref 7–23)
BUN SERPL-MCNC: 81 MG/DL — HIGH (ref 7–23)
BUN SERPL-MCNC: 82 MG/DL — HIGH (ref 7–23)
CALCIUM SERPL-MCNC: 8.9 MG/DL — SIGNIFICANT CHANGE UP (ref 8.4–10.5)
CALCIUM SERPL-MCNC: 9.3 MG/DL — SIGNIFICANT CHANGE UP (ref 8.4–10.5)
CALCIUM SERPL-MCNC: 9.3 MG/DL — SIGNIFICANT CHANGE UP (ref 8.4–10.5)
CHLORIDE SERPL-SCNC: 97 MMOL/L — SIGNIFICANT CHANGE UP (ref 96–108)
CHLORIDE SERPL-SCNC: 98 MMOL/L — SIGNIFICANT CHANGE UP (ref 96–108)
CHLORIDE SERPL-SCNC: 99 MMOL/L — SIGNIFICANT CHANGE UP (ref 96–108)
CO2 SERPL-SCNC: 17 MMOL/L — LOW (ref 22–31)
CO2 SERPL-SCNC: 18 MMOL/L — LOW (ref 22–31)
CO2 SERPL-SCNC: 18 MMOL/L — LOW (ref 22–31)
CREAT SERPL-MCNC: 4.31 MG/DL — HIGH (ref 0.5–1.3)
CREAT SERPL-MCNC: 4.7 MG/DL — HIGH (ref 0.5–1.3)
CREAT SERPL-MCNC: 4.76 MG/DL — HIGH (ref 0.5–1.3)
CULTURE RESULTS: SIGNIFICANT CHANGE UP
CULTURE RESULTS: SIGNIFICANT CHANGE UP
EGFR: 13 ML/MIN/1.73M2 — LOW
EGFR: 13 ML/MIN/1.73M2 — LOW
EGFR: 14 ML/MIN/1.73M2 — LOW
EOSINOPHIL # BLD AUTO: 0.05 K/UL — SIGNIFICANT CHANGE UP (ref 0–0.5)
EOSINOPHIL NFR BLD AUTO: 0.4 % — SIGNIFICANT CHANGE UP (ref 0–6)
GAS PNL BLDA: SIGNIFICANT CHANGE UP
GLUCOSE SERPL-MCNC: 107 MG/DL — HIGH (ref 70–99)
GLUCOSE SERPL-MCNC: 110 MG/DL — HIGH (ref 70–99)
GLUCOSE SERPL-MCNC: 132 MG/DL — HIGH (ref 70–99)
GRAM STN FLD: SIGNIFICANT CHANGE UP
HCT VFR BLD CALC: 33.1 % — LOW (ref 39–50)
HGB BLD-MCNC: 10.6 G/DL — LOW (ref 13–17)
IMM GRANULOCYTES NFR BLD AUTO: 1.1 % — HIGH (ref 0–0.9)
INR BLD: 0.96 RATIO — SIGNIFICANT CHANGE UP (ref 0.85–1.16)
LEGIONELLA AG UR QL: NEGATIVE — SIGNIFICANT CHANGE UP
LYMPHOCYTES # BLD AUTO: 0.61 K/UL — LOW (ref 1–3.3)
LYMPHOCYTES # BLD AUTO: 4.8 % — LOW (ref 13–44)
MAGNESIUM SERPL-MCNC: 2.5 MG/DL — SIGNIFICANT CHANGE UP (ref 1.6–2.6)
MAGNESIUM SERPL-MCNC: 2.6 MG/DL — SIGNIFICANT CHANGE UP (ref 1.6–2.6)
MAGNESIUM SERPL-MCNC: 2.6 MG/DL — SIGNIFICANT CHANGE UP (ref 1.6–2.6)
MCHC RBC-ENTMCNC: 31.3 PG — SIGNIFICANT CHANGE UP (ref 27–34)
MCHC RBC-ENTMCNC: 32 G/DL — SIGNIFICANT CHANGE UP (ref 32–36)
MCV RBC AUTO: 97.6 FL — SIGNIFICANT CHANGE UP (ref 80–100)
MONOCYTES # BLD AUTO: 0.67 K/UL — SIGNIFICANT CHANGE UP (ref 0–0.9)
MONOCYTES NFR BLD AUTO: 5.2 % — SIGNIFICANT CHANGE UP (ref 2–14)
NEUTROPHILS # BLD AUTO: 11.29 K/UL — HIGH (ref 1.8–7.4)
NEUTROPHILS NFR BLD AUTO: 88.4 % — HIGH (ref 43–77)
NRBC # BLD: 0 /100 WBCS — SIGNIFICANT CHANGE UP (ref 0–0)
NRBC BLD-RTO: 0 /100 WBCS — SIGNIFICANT CHANGE UP (ref 0–0)
PHOSPHATE SERPL-MCNC: 7.8 MG/DL — HIGH (ref 2.5–4.5)
PHOSPHATE SERPL-MCNC: 7.9 MG/DL — HIGH (ref 2.5–4.5)
PHOSPHATE SERPL-MCNC: 8.1 MG/DL — HIGH (ref 2.5–4.5)
PLATELET # BLD AUTO: 162 K/UL — SIGNIFICANT CHANGE UP (ref 150–400)
POTASSIUM SERPL-MCNC: 3.5 MMOL/L — SIGNIFICANT CHANGE UP (ref 3.5–5.3)
POTASSIUM SERPL-MCNC: 3.7 MMOL/L — SIGNIFICANT CHANGE UP (ref 3.5–5.3)
POTASSIUM SERPL-MCNC: 4 MMOL/L — SIGNIFICANT CHANGE UP (ref 3.5–5.3)
POTASSIUM SERPL-SCNC: 3.5 MMOL/L — SIGNIFICANT CHANGE UP (ref 3.5–5.3)
POTASSIUM SERPL-SCNC: 3.7 MMOL/L — SIGNIFICANT CHANGE UP (ref 3.5–5.3)
POTASSIUM SERPL-SCNC: 4 MMOL/L — SIGNIFICANT CHANGE UP (ref 3.5–5.3)
PROT SERPL-MCNC: 6.2 G/DL — SIGNIFICANT CHANGE UP (ref 6–8.3)
PROT SERPL-MCNC: 6.5 G/DL — SIGNIFICANT CHANGE UP (ref 6–8.3)
PROT SERPL-MCNC: 6.6 G/DL — SIGNIFICANT CHANGE UP (ref 6–8.3)
PROTHROM AB SERPL-ACNC: 10.9 SEC — SIGNIFICANT CHANGE UP (ref 9.9–13.4)
RBC # BLD: 3.39 M/UL — LOW (ref 4.2–5.8)
RBC # FLD: 13 % — SIGNIFICANT CHANGE UP (ref 10.3–14.5)
SODIUM SERPL-SCNC: 139 MMOL/L — SIGNIFICANT CHANGE UP (ref 135–145)
SPECIMEN SOURCE: SIGNIFICANT CHANGE UP
T3 SERPL-MCNC: 74 NG/DL — LOW (ref 80–200)
T4 AB SER-ACNC: 6 UG/DL — SIGNIFICANT CHANGE UP (ref 4.6–12)
TSH SERPL-MCNC: 0.87 UIU/ML — SIGNIFICANT CHANGE UP (ref 0.27–4.2)
WBC # BLD: 12.77 K/UL — HIGH (ref 3.8–10.5)
WBC # FLD AUTO: 12.77 K/UL — HIGH (ref 3.8–10.5)

## 2025-01-25 PROCEDURE — 99291 CRITICAL CARE FIRST HOUR: CPT

## 2025-01-25 PROCEDURE — 70543 MRI ORBT/FAC/NCK W/O &W/DYE: CPT | Mod: 26

## 2025-01-25 PROCEDURE — 70553 MRI BRAIN STEM W/O & W/DYE: CPT | Mod: 26

## 2025-01-25 PROCEDURE — 71045 X-RAY EXAM CHEST 1 VIEW: CPT | Mod: 26

## 2025-01-25 RX ORDER — BUMETANIDE 2 MG/1
2 TABLET ORAL
Qty: 20 | Refills: 0 | Status: DISCONTINUED | OUTPATIENT
Start: 2025-01-25 | End: 2025-01-25

## 2025-01-25 RX ADMIN — LIDOCAINE HYDROCHLORIDE 1 PATCH: 30 CREAM TOPICAL at 06:50

## 2025-01-25 RX ADMIN — ATORVASTATIN CALCIUM 20 MILLIGRAM(S): 80 TABLET, FILM COATED ORAL at 21:44

## 2025-01-25 RX ADMIN — ANTISEPTIC SURGICAL SCRUB 1 APPLICATION(S): 0.04 SOLUTION TOPICAL at 05:34

## 2025-01-25 RX ADMIN — ESCITALOPRAM 15 MILLIGRAM(S): 10 TABLET, FILM COATED ORAL at 12:02

## 2025-01-25 RX ADMIN — Medication 4 MILLILITER(S): at 05:45

## 2025-01-25 RX ADMIN — LACOSAMIDE 120 MILLIGRAM(S): 200 TABLET, FILM COATED ORAL at 05:33

## 2025-01-25 RX ADMIN — LAMOTRIGINE 100 MILLIGRAM(S): 100 TABLET ORAL at 17:34

## 2025-01-25 RX ADMIN — PIPERACILLIN SODIUM AND TAZOBACTAM SODIUM 25 GRAM(S): 2; 250 INJECTION, POWDER, FOR SOLUTION INTRAVENOUS at 17:33

## 2025-01-25 RX ADMIN — IPRATROPIUM BROMIDE AND ALBUTEROL SULFATE 3 MILLILITER(S): .5; 2.5 SOLUTION RESPIRATORY (INHALATION) at 17:31

## 2025-01-25 RX ADMIN — PROPOFOL 31 MICROGRAM(S)/KG/MIN: 10 INJECTION, EMULSION INTRAVENOUS at 19:34

## 2025-01-25 RX ADMIN — LEVETIRACETAM 400 MILLIGRAM(S): 750 TABLET, FILM COATED ORAL at 17:33

## 2025-01-25 RX ADMIN — PANTOPRAZOLE 40 MILLIGRAM(S): 20 TABLET, DELAYED RELEASE ORAL at 12:02

## 2025-01-25 RX ADMIN — IPRATROPIUM BROMIDE AND ALBUTEROL SULFATE 3 MILLILITER(S): .5; 2.5 SOLUTION RESPIRATORY (INHALATION) at 05:45

## 2025-01-25 RX ADMIN — PIPERACILLIN SODIUM AND TAZOBACTAM SODIUM 25 GRAM(S): 2; 250 INJECTION, POWDER, FOR SOLUTION INTRAVENOUS at 05:32

## 2025-01-25 RX ADMIN — IPRATROPIUM BROMIDE AND ALBUTEROL SULFATE 3 MILLILITER(S): .5; 2.5 SOLUTION RESPIRATORY (INHALATION) at 11:13

## 2025-01-25 RX ADMIN — LACOSAMIDE 120 MILLIGRAM(S): 200 TABLET, FILM COATED ORAL at 17:33

## 2025-01-25 RX ADMIN — PROPOFOL 31 MICROGRAM(S)/KG/MIN: 10 INJECTION, EMULSION INTRAVENOUS at 12:01

## 2025-01-25 RX ADMIN — LIDOCAINE HYDROCHLORIDE 1 PATCH: 30 CREAM TOPICAL at 05:33

## 2025-01-25 RX ADMIN — ANTISEPTIC SURGICAL SCRUB 15 MILLILITER(S): 0.04 SOLUTION TOPICAL at 17:35

## 2025-01-25 RX ADMIN — PROPOFOL 31 MICROGRAM(S)/KG/MIN: 10 INJECTION, EMULSION INTRAVENOUS at 05:35

## 2025-01-25 RX ADMIN — Medication 1 APPLICATION(S): at 05:34

## 2025-01-25 RX ADMIN — Medication 10 MILLILITER(S): at 21:43

## 2025-01-25 RX ADMIN — GABAPENTIN 300 MILLIGRAM(S): 800 TABLET ORAL at 17:34

## 2025-01-25 RX ADMIN — Medication 7500 UNIT(S): at 17:34

## 2025-01-25 RX ADMIN — ANTISEPTIC SURGICAL SCRUB 15 MILLILITER(S): 0.04 SOLUTION TOPICAL at 05:33

## 2025-01-25 RX ADMIN — PROPOFOL 31 MICROGRAM(S)/KG/MIN: 10 INJECTION, EMULSION INTRAVENOUS at 17:49

## 2025-01-25 RX ADMIN — Medication 7500 UNIT(S): at 05:33

## 2025-01-25 RX ADMIN — Medication 4 MILLILITER(S): at 17:31

## 2025-01-25 RX ADMIN — LURASIDONE HYDROCHLORIDE 40 MILLIGRAM(S): 80 TABLET, FILM COATED ORAL at 12:02

## 2025-01-25 RX ADMIN — POLYETHYLENE GLYCOL 3350 17 GRAM(S): 17 POWDER, FOR SOLUTION ORAL at 12:02

## 2025-01-25 RX ADMIN — LEVETIRACETAM 400 MILLIGRAM(S): 750 TABLET, FILM COATED ORAL at 05:20

## 2025-01-25 RX ADMIN — LAMOTRIGINE 100 MILLIGRAM(S): 100 TABLET ORAL at 05:34

## 2025-01-25 RX ADMIN — GABAPENTIN 300 MILLIGRAM(S): 800 TABLET ORAL at 05:32

## 2025-01-25 RX ADMIN — ATORVASTATIN CALCIUM 20 MILLIGRAM(S): 80 TABLET, FILM COATED ORAL at 05:33

## 2025-01-25 RX ADMIN — Medication 1 APPLICATION(S): at 17:35

## 2025-01-25 RX ADMIN — IPRATROPIUM BROMIDE AND ALBUTEROL SULFATE 3 MILLILITER(S): .5; 2.5 SOLUTION RESPIRATORY (INHALATION) at 23:19

## 2025-01-25 NOTE — PROGRESS NOTE ADULT - SUBJECTIVE AND OBJECTIVE BOX
DATE OF SERVICE: 01-25-25 @ 17:44    Patient is a 69y old  Male who presents with a chief complaint of seizure, flu, elevated trop (25 Jan 2025 14:01)      INTERVAL HISTORY:     REVIEW OF SYSTEMS: Pt is intubated    TELEMETRY Personally reviewed: Sinus tach 110s  	  MEDICATIONS:  norepinephrine Infusion 0.05 MICROgram(s)/kG/Min IV Continuous <Continuous>        PHYSICAL EXAM:  T(C): 37.4 (01-25-25 @ 16:00), Max: 37.4 (01-25-25 @ 16:00)  HR: 85 (01-25-25 @ 17:34) (85 - 106)  BP: 117/70 (01-25-25 @ 15:00) (87/57 - 137/79)  RR: 24 (01-25-25 @ 16:00) (24 - 31)  SpO2: 100% (01-25-25 @ 17:34) (93% - 100%)  Wt(kg): --  I&O's Summary    24 Jan 2025 07:01  -  25 Jan 2025 07:00  --------------------------------------------------------  IN: 1812.1 mL / OUT: 2805 mL / NET: -992.9 mL    25 Jan 2025 07:01  -  25 Jan 2025 17:44  --------------------------------------------------------  IN: 225.7 mL / OUT: 1050 mL / NET: -824.3 mL      Appearance: In no distress	  HEENT:    PERRL, EOMI	  Cardiovascular:  S1 S2, No JVD  Respiratory: Decreased breath sounds at the bases  Gastrointestinal:  Soft, Non-tender, + BS	  Vascularature:  No edema of LE  Psychiatric: Appropriate affect   Neuro: intubated                          10.6   12.77 )-----------( 162      ( 25 Jan 2025 01:18 )             33.1     01-25    139  |  97  |  81[H]  ----------------------------<  107[H]  3.7   |  18[L]  |  4.70[H]    Ca    8.9      25 Jan 2025 11:31  Phos  7.9     01-25  Mg     2.6     01-25    TPro  6.2  /  Alb  2.9[L]  /  TBili  0.5  /  DBili  x   /  AST  103[H]  /  ALT  105[H]  /  AlkPhos  141[H]  01-25    Labs personally reviewed      ASSESSMENT/PLAN: 	    69M w/ hx of metastatic testicular cancer (s/p L orchiectomy, on etoposide/cisplatin chemo, last dose 6/2024), epilepsy (grand-mal seizures), schizophrenia, developmental delay, HTN, HLD, VAZQUEZ, stage III CKD, obesity, secondary hyperparathyroidism, anemia, thrombocytopenia, ? spinal surgery who was admitted on 1/16/25 for fall and syncope. On 1/17 patient had multiple RRTs called for grand mal seizure activity and  patient had x2 more episode and was ultimately given ativan 2mg, Vimpat load 200mg, and intubated for airway protection with MICU transfer. Extubated 1/19 - to AVAPS, post extubation with increased secretion, now better; downgraded 1/21; While on medical floors; patient developed respiratory distress; now intubated for suspected aspiration pna vs flash pulmonary edema.     Problem/Plan #1: Acute Hypoxic Respiratory Failure  - Intubated for airway protection i/s/o multiple RRTs for grand mal seizure activity, Extubated 1/19 to AVAPS  - Reintubated 1/23 after hypoxic episode for suspected aspiration pna vs flash pulmonary edema.   - BNP 3949  - CXR shows mild pulm edema on 1/16; Repeat CXR pending  - TTE with preserved EF, no WMA, dilated aortic root  - Was on Bumex IV - stopped today 1/25 due to ^ creatinine 4.31 from 3.92  - Monitor creat  - c/w IV Abx as per primary team  - Monitor strict I&Os, daily weights    Problem/Plan #2: Hypertensive Urgency  - amlodipine increased to 10mg daily  - BP now soft. Exercise caution in further uptitrating medications.     Problem/Plan #3: HLD  - c/w atorvastatin 20mg daily    Problem/Plan #4: DVT Ppx  - c/w SQ heparin    SAAD Lagos DO [facility]  Cardiovascular Medicine  [address] [place] [address], [address]  Available through call or text on [employer]  Office: [phone]

## 2025-01-25 NOTE — PROGRESS NOTE ADULT - SUBJECTIVE AND OBJECTIVE BOX
CHIEF COMPLAINT: Patient is a 69y old  Male who presents with a chief complaint of seizure, flu, elevated trop (24 Jan 2025 20:59)    Interval Events:     REVIEW OF SYSTEMS:    CONSTITUTIONAL:  No weakness, fevers or chills  EYES/ENT:  No visual changes;  No vertigo or throat pain   NECK:  No pain or stiffness  RESPIRATORY:  No cough, wheezing, hemoptysis; No shortness of breath  CARDIOVASCULAR:  No chest pain or palpitations  GASTROINTESTINAL:  No abdominal or epigastric pain. No nausea, vomiting, or hematemesis; No diarrhea or constipation. No melena or hematochezia.  GENITOURINARY:  No dysuria, frequency or hematuria  MUSCULOSKELETAL:  FROM all extremities, normal strength, No calf tenderness  NEUROLOGICAL:  No numbness or weakness  SKIN:  No itching, rashes  [ ] All other systems negative  [X] Unable to assess ROS because I/S    OBJECTIVE:  ICU Vital Signs Last 24 Hrs  T(C): 37.3 (25 Jan 2025 04:00), Max: 37.3 (24 Jan 2025 07:45)  T(F): 99.1 (25 Jan 2025 04:00), Max: 99.1 (24 Jan 2025 07:45)  HR: 90 (25 Jan 2025 06:00) (86 - 106)  BP: 99/56 (25 Jan 2025 06:00) (83/52 - 137/79)  BP(mean): 71 (25 Jan 2025 06:00) (61 - 101)  ABP: --  ABP(mean): --  RR: 24 (25 Jan 2025 06:00) (14 - 31)  SpO2: 100% (25 Jan 2025 06:00) (93% - 100%)    O2 Parameters below as of 24 Jan 2025 20:00  Patient On (Oxygen Delivery Method): ventilator    O2 Concentration (%): 40        01-24 @ 07:01  -  01-25 @ 07:00  --------------------------------------------------------  IN: 1812.1 mL / OUT: 2805 mL / NET: -992.9 mL      Mode: AC/ CMV (Assist Control/ Continuous Mandatory Ventilation), RR (machine): 24, TV (machine): 450, FiO2: 30, PEEP: 8, ITime: 1, MAP: 14, PIP: 27  Drips  CAPILLARY BLOOD GLUCOSE      POCT Blood Glucose.: 244 mg/dL (23 Jan 2025 13:23)          PHYSICAL EXAM:  General: NAD, doing well.  HEENT: [X] Intubated. [X] OG in place. Brainstem reflexes in place  Lymph Nodes: No FREDDY palpated  Neck: trachea midline. no trach.   Respiratory: CTA b/l. No rales, rhonchi, wheezing appreciated.  Cardiovascular: RRR. +s1/s2, -s3/s4. No murmur, rubs, gallops. No edema  Abdomen: NT/ND. +BS, No HSM.   Extremities: 2+ pulses  Skin: [ ] edematous. No rashes, ecchymoses, or petechiae noted  Neurological: AAOx3. DTR 2+. strength 5/5 UE/LE bilaterally.   Psychiatry: Appropriate mood and affect.    LABS:  (01-25 @ 01:18)                        10.6  12.77 )-----------( 162                 33.1    Neutrophils = 11.29 (88.4%)  Lymphocytes = 0.61 (4.8%)  Eosinophils = 0.05 (0.4%)  Basophils = 0.01 (0.1%)  Monocytes = 0.67 (5.2%)  Bands = --%    WBC Trend: 12.77<--, 16.33<--, 13.21<--  Hb Trend: 10.6<--, 11.2<--, 12.0<--, 12.4<--, 10.3<--  Plt Trend: 162<--, 109<--, 135<--, 123<--, 106<--  01-25    139  |  98  |  74[H]  ----------------------------<  132[H]  4.0   |  17[L]  |  4.31[H]    Ca    9.3      25 Jan 2025 01:18  Phos  7.8     01-25  Mg     2.5     01-25    TPro  6.6  /  Alb  3.1[L]  /  TBili  0.6  /  DBili  x   /  AST  86[H]  /  ALT  107[H]  /  AlkPhos  140[H]  01-25    Creatinine Trend: 4.31<--, 3.92<--, 3.61<--, 2.95<--, 2.69<--, 2.09<--  PT/INR - ( 25 Jan 2025 01:18 )   PT: 10.9 sec;   INR: 0.96 ratio         PTT - ( 25 Jan 2025 01:18 )  PTT:30.3 sec  Urinalysis Basic - ( 25 Jan 2025 01:18 )    Color: x / Appearance: x / SG: x / pH: x  Gluc: 132 mg/dL / Ketone: x  / Bili: x / Urobili: x   Blood: x / Protein: x / Nitrite: x   Leuk Esterase: x / RBC: x / WBC x   Sq Epi: x / Non Sq Epi: x / Bacteria: x      Arterial Blood Gas:  01-25 @ 01:15  7.33/37/117/20/98.5/-5.9  ABG lactate: --  Arterial Blood Gas:  01-23 @ 23:58  7.30/38/159/19/97.7/-7.1  ABG lactate: --  Arterial Blood Gas:  01-23 @ 18:03  7.32/35/132/18/97.7/-7.3  ABG lactate: --  Arterial Blood Gas:  01-23 @ 14:55  7.30/38/85/19/95.1/-7.1  ABG lactate: --    Venous Blood Gas:  01-23 @ 18:26  7.23/51/35/21/45.4  VBG Lactate: 2.2          MICROBIOLOGY:   Blood Cx:  Urine Cx:  Sputum Cx:  Legionella:  RVP:    HOSPITAL MEDICATIONS:  MEDICATIONS  (STANDING):  albuterol/ipratropium for Nebulization 3 milliLiter(s) Nebulizer every 6 hours  atorvastatin 20 milliGRAM(s) Oral at bedtime  bacitracin   Ointment 1 Application(s) Topical two times a day  buMETAnide Infusion 4 mG/Hr (20 mL/Hr) IV Continuous <Continuous>  chlorhexidine 0.12% Liquid 15 milliLiter(s) Oral Mucosa every 12 hours  chlorhexidine 2% Cloths 1 Application(s) Topical <User Schedule>  chlorhexidine 2% Cloths 1 Application(s) Topical <User Schedule>  dexMEDEtomidine Infusion 0.5 MICROgram(s)/kG/Hr (12.9 mL/Hr) IV Continuous <Continuous>  escitalopram 15 milliGRAM(s) Oral daily  gabapentin Solution 300 milliGRAM(s) Oral two times a day  heparin   Injectable 7500 Unit(s) SubCutaneous every 12 hours  influenza  Vaccine (HIGH DOSE) 0.5 milliLiter(s) IntraMuscular once  lacosamide IVPB 100 milliGRAM(s) IV Intermittent every 12 hours  lamoTRIgine 100 milliGRAM(s) Oral two times a day  levETIRAcetam  IVPB 1500 milliGRAM(s) IV Intermittent two times a day  lidocaine   4% Patch 1 Patch Transdermal daily  lurasidone 40 milliGRAM(s) Oral daily  norepinephrine Infusion 0.05 MICROgram(s)/kG/Min (9.68 mL/Hr) IV Continuous <Continuous>  pantoprazole  Injectable 40 milliGRAM(s) IV Push daily  piperacillin/tazobactam IVPB.. 3.375 Gram(s) IV Intermittent every 12 hours  polyethylene glycol 3350 17 Gram(s) Oral daily  propofol Infusion 50 MICROgram(s)/kG/Min (31 mL/Hr) IV Continuous <Continuous>  senna Syrup 10 milliLiter(s) Oral at bedtime  sodium chloride 3%  Inhalation 4 milliLiter(s) Inhalation every 12 hours    MEDICATIONS  (PRN):  fentaNYL    Injectable 25 MICROGram(s) IV Push every 4 hours PRN Moderate Pain (4 - 6)  nystatin Powder 1 Application(s) Topical three times a day PRN fungal infection you may see and want to treat      RADIOLOGY:  X Ray:  CT:  MRI:  Ultrasound:  [ ] Reviewed and interpreted by me    EKG:   CHIEF COMPLAINT: Patient is a 69y old  Male who presents with a chief complaint of seizure, flu, elevated trop (24 Jan 2025 20:59)    Interval Events: Patient was diuresed yesterday with Bumex 4mg gtt with net negative by evening. Re-POCUS showing B lines throughout and plethoric IVC consistent with fluid overload. Creat 3.95. Given fluid overload, kept Bumex 4mg gtt overnight. This morning, patient's POCUS showed much fewer B lines and ***    REVIEW OF SYSTEMS:    CONSTITUTIONAL:  No weakness, fevers or chills  EYES/ENT:  No visual changes;  No vertigo or throat pain   NECK:  No pain or stiffness  RESPIRATORY:  No cough, wheezing, hemoptysis; No shortness of breath  CARDIOVASCULAR:  No chest pain or palpitations  GASTROINTESTINAL:  No abdominal or epigastric pain. No nausea, vomiting, or hematemesis; No diarrhea or constipation. No melena or hematochezia.  GENITOURINARY:  No dysuria, frequency or hematuria  MUSCULOSKELETAL:  FROM all extremities, normal strength, No calf tenderness  NEUROLOGICAL:  No numbness or weakness  SKIN:  No itching, rashes  [ ] All other systems negative  [X] Unable to assess ROS because I/S    OBJECTIVE:  ICU Vital Signs Last 24 Hrs  T(C): 37.3 (25 Jan 2025 04:00), Max: 37.3 (24 Jan 2025 07:45)  T(F): 99.1 (25 Jan 2025 04:00), Max: 99.1 (24 Jan 2025 07:45)  HR: 90 (25 Jan 2025 06:00) (86 - 106)  BP: 99/56 (25 Jan 2025 06:00) (83/52 - 137/79)  BP(mean): 71 (25 Jan 2025 06:00) (61 - 101)  ABP: --  ABP(mean): --  RR: 24 (25 Jan 2025 06:00) (14 - 31)  SpO2: 100% (25 Jan 2025 06:00) (93% - 100%)    O2 Parameters below as of 24 Jan 2025 20:00  Patient On (Oxygen Delivery Method): ventilator    O2 Concentration (%): 40        01-24 @ 07:01  -  01-25 @ 07:00  --------------------------------------------------------  IN: 1812.1 mL / OUT: 2805 mL / NET: -992.9 mL      Mode: AC/ CMV (Assist Control/ Continuous Mandatory Ventilation), RR (machine): 24, TV (machine): 450, FiO2: 30, PEEP: 8, ITime: 1, MAP: 14, PIP: 27  Drips  CAPILLARY BLOOD GLUCOSE      POCT Blood Glucose.: 244 mg/dL (23 Jan 2025 13:23)          PHYSICAL EXAM:  General: NAD, doing well.  HEENT: [X] Intubated. [X] OG in place. Brainstem reflexes in place  Lymph Nodes: No FREDDY palpated  Neck: trachea midline. no trach.   Respiratory: CTA b/l. No rales, rhonchi, wheezing appreciated.  Cardiovascular: RRR. +s1/s2, -s3/s4. No murmur, rubs, gallops. No edema  Abdomen: NT/ND. +BS, No HSM.   Extremities: 2+ pulses  Skin: [ ] edematous. No rashes, ecchymoses, or petechiae noted  Neurological: AAOx3. DTR 2+. strength 5/5 UE/LE bilaterally.   Psychiatry: Appropriate mood and affect.    LABS:  (01-25 @ 01:18)                        10.6  12.77 )-----------( 162                 33.1    Neutrophils = 11.29 (88.4%)  Lymphocytes = 0.61 (4.8%)  Eosinophils = 0.05 (0.4%)  Basophils = 0.01 (0.1%)  Monocytes = 0.67 (5.2%)  Bands = --%    WBC Trend: 12.77<--, 16.33<--, 13.21<--  Hb Trend: 10.6<--, 11.2<--, 12.0<--, 12.4<--, 10.3<--  Plt Trend: 162<--, 109<--, 135<--, 123<--, 106<--  01-25    139  |  98  |  74[H]  ----------------------------<  132[H]  4.0   |  17[L]  |  4.31[H]    Ca    9.3      25 Jan 2025 01:18  Phos  7.8     01-25  Mg     2.5     01-25    TPro  6.6  /  Alb  3.1[L]  /  TBili  0.6  /  DBili  x   /  AST  86[H]  /  ALT  107[H]  /  AlkPhos  140[H]  01-25    Creatinine Trend: 4.31<--, 3.92<--, 3.61<--, 2.95<--, 2.69<--, 2.09<--  PT/INR - ( 25 Jan 2025 01:18 )   PT: 10.9 sec;   INR: 0.96 ratio         PTT - ( 25 Jan 2025 01:18 )  PTT:30.3 sec  Urinalysis Basic - ( 25 Jan 2025 01:18 )    Color: x / Appearance: x / SG: x / pH: x  Gluc: 132 mg/dL / Ketone: x  / Bili: x / Urobili: x   Blood: x / Protein: x / Nitrite: x   Leuk Esterase: x / RBC: x / WBC x   Sq Epi: x / Non Sq Epi: x / Bacteria: x      Arterial Blood Gas:  01-25 @ 01:15  7.33/37/117/20/98.5/-5.9  ABG lactate: --  Arterial Blood Gas:  01-23 @ 23:58  7.30/38/159/19/97.7/-7.1  ABG lactate: --  Arterial Blood Gas:  01-23 @ 18:03  7.32/35/132/18/97.7/-7.3  ABG lactate: --  Arterial Blood Gas:  01-23 @ 14:55  7.30/38/85/19/95.1/-7.1  ABG lactate: --    Venous Blood Gas:  01-23 @ 18:26  7.23/51/35/21/45.4  VBG Lactate: 2.2          MICROBIOLOGY:   Blood Cx:  Urine Cx:  Sputum Cx:  Legionella:  RVP:    HOSPITAL MEDICATIONS:  MEDICATIONS  (STANDING):  albuterol/ipratropium for Nebulization 3 milliLiter(s) Nebulizer every 6 hours  atorvastatin 20 milliGRAM(s) Oral at bedtime  bacitracin   Ointment 1 Application(s) Topical two times a day  buMETAnide Infusion 4 mG/Hr (20 mL/Hr) IV Continuous <Continuous>  chlorhexidine 0.12% Liquid 15 milliLiter(s) Oral Mucosa every 12 hours  chlorhexidine 2% Cloths 1 Application(s) Topical <User Schedule>  chlorhexidine 2% Cloths 1 Application(s) Topical <User Schedule>  dexMEDEtomidine Infusion 0.5 MICROgram(s)/kG/Hr (12.9 mL/Hr) IV Continuous <Continuous>  escitalopram 15 milliGRAM(s) Oral daily  gabapentin Solution 300 milliGRAM(s) Oral two times a day  heparin   Injectable 7500 Unit(s) SubCutaneous every 12 hours  influenza  Vaccine (HIGH DOSE) 0.5 milliLiter(s) IntraMuscular once  lacosamide IVPB 100 milliGRAM(s) IV Intermittent every 12 hours  lamoTRIgine 100 milliGRAM(s) Oral two times a day  levETIRAcetam  IVPB 1500 milliGRAM(s) IV Intermittent two times a day  lidocaine   4% Patch 1 Patch Transdermal daily  lurasidone 40 milliGRAM(s) Oral daily  norepinephrine Infusion 0.05 MICROgram(s)/kG/Min (9.68 mL/Hr) IV Continuous <Continuous>  pantoprazole  Injectable 40 milliGRAM(s) IV Push daily  piperacillin/tazobactam IVPB.. 3.375 Gram(s) IV Intermittent every 12 hours  polyethylene glycol 3350 17 Gram(s) Oral daily  propofol Infusion 50 MICROgram(s)/kG/Min (31 mL/Hr) IV Continuous <Continuous>  senna Syrup 10 milliLiter(s) Oral at bedtime  sodium chloride 3%  Inhalation 4 milliLiter(s) Inhalation every 12 hours    MEDICATIONS  (PRN):  fentaNYL    Injectable 25 MICROGram(s) IV Push every 4 hours PRN Moderate Pain (4 - 6)  nystatin Powder 1 Application(s) Topical three times a day PRN fungal infection you may see and want to treat      RADIOLOGY:  X Ray:  CT:  MRI:  Ultrasound:  [ ] Reviewed and interpreted by me    EKG:   CHIEF COMPLAINT: Patient is a 69y old  Male who presents with a chief complaint of seizure, flu, elevated trop (24 Jan 2025 20:59)    Interval Events: Patient was diuresed yesterday with Bumex 4mg gtt with net negative by evening. Re-POCUS showing B lines throughout and plethoric IVC consistent with fluid overload. Creat 3.95. Given fluid overload, kept Bumex 4mg gtt overnight. This morning, patient's POCUS showed much fewer B lines.     REVIEW OF SYSTEMS: Intubated, sedated.     OBJECTIVE:  ICU Vital Signs Last 24 Hrs  T(C): 37.3 (25 Jan 2025 04:00), Max: 37.3 (24 Jan 2025 07:45)  T(F): 99.1 (25 Jan 2025 04:00), Max: 99.1 (24 Jan 2025 07:45)  HR: 90 (25 Jan 2025 06:00) (86 - 106)  BP: 99/56 (25 Jan 2025 06:00) (83/52 - 137/79)  BP(mean): 71 (25 Jan 2025 06:00) (61 - 101)  RR: 24 (25 Jan 2025 06:00) (14 - 31)  SpO2: 100% (25 Jan 2025 06:00) (93% - 100%)    O2 Parameters below as of 24 Jan 2025 20:00  Patient On (Oxygen Delivery Method): ventilator  O2 Concentration (%): 40    01-24 @ 07:01  -  01-25 @ 07:00  --------------------------------------------------------  IN: 1812.1 mL / OUT: 2805 mL / NET: -992.9 mL    Mode: AC/ CMV (Assist Control/ Continuous Mandatory Ventilation), RR (machine): 24, TV (machine): 450, FiO2: 30, PEEP: 8, ITime: 1, MAP: 14, PIP: 27    PHYSICAL EXAM:  General: Intubated, sedated, not much arousal today  HEENT: Normocephalic. Atraumatic. Normal icterus. MMM  Heart: Regular rate, rhythm. Distant sounds.   Lungs: faint crackles, mild wheeze, no distress.  GI: Soft, distended/obese, nontender  Neuro: Sedated, pupils appropriate  Ext: Mild edema bilaterally.   Skin: Warm, well-perfused     LABS:  POCT Blood Glucose.: 244 mg/dL (23 Jan 2025 13:23)    (01-25 @ 01:18)                        10.6  12.77 )-----------( 162                 33.1    Neutrophils = 11.29 (88.4%)  Lymphocytes = 0.61 (4.8%)  Eosinophils = 0.05 (0.4%)  Basophils = 0.01 (0.1%)  Monocytes = 0.67 (5.2%)  Bands = --%    WBC Trend: 12.77<--, 16.33<--, 13.21<--  Hb Trend: 10.6<--, 11.2<--, 12.0<--, 12.4<--, 10.3<--  Plt Trend: 162<--, 109<--, 135<--, 123<--, 106<--  01-25    139  |  98  |  74[H]  ----------------------------<  132[H]  4.0   |  17[L]  |  4.31[H]    Ca    9.3      25 Jan 2025 01:18  Phos  7.8     01-25  Mg     2.5     01-25    TPro  6.6  /  Alb  3.1[L]  /  TBili  0.6  /  DBili  x   /  AST  86[H]  /  ALT  107[H]  /  AlkPhos  140[H]  01-25  Creatinine Trend: 4.31<--, 3.92<--, 3.61<--, 2.95<--, 2.69<--, 2.09<--  PT/INR - ( 25 Jan 2025 01:18 )   PT: 10.9 sec;   INR: 0.96 ratio    PTT - ( 25 Jan 2025 01:18 )  PTT:30.3 sec  Urinalysis Basic - ( 25 Jan 2025 01:18 )  Color: x / Appearance: x / SG: x / pH: x  Gluc: 132 mg/dL / Ketone: x  / Bili: x / Urobili: x   Blood: x / Protein: x / Nitrite: x   Leuk Esterase: x / RBC: x / WBC x   Sq Epi: x / Non Sq Epi: x / Bacteria: x    Arterial Blood Gas:  01-25 @ 01:15  7.33/37/117/20/98.5/-5.9  ABG lactate: --  Arterial Blood Gas:  01-23 @ 23:58  7.30/38/159/19/97.7/-7.1  ABG lactate: --  Arterial Blood Gas:  01-23 @ 18:03  7.32/35/132/18/97.7/-7.3  ABG lactate: --  Arterial Blood Gas:  01-23 @ 14:55  7.30/38/85/19/95.1/-7.1  ABG lactate: --  Venous Blood Gas:  01-23 @ 18:26  7.23/51/35/21/45.4  VBG Lactate: 2.2    HOSPITAL MEDICATIONS:  MEDICATIONS  (STANDING):  albuterol/ipratropium for Nebulization 3 milliLiter(s) Nebulizer every 6 hours  atorvastatin 20 milliGRAM(s) Oral at bedtime  bacitracin   Ointment 1 Application(s) Topical two times a day  buMETAnide Infusion 4 mG/Hr (20 mL/Hr) IV Continuous <Continuous>  chlorhexidine 0.12% Liquid 15 milliLiter(s) Oral Mucosa every 12 hours  chlorhexidine 2% Cloths 1 Application(s) Topical <User Schedule>  chlorhexidine 2% Cloths 1 Application(s) Topical <User Schedule>  dexMEDEtomidine Infusion 0.5 MICROgram(s)/kG/Hr (12.9 mL/Hr) IV Continuous <Continuous>  escitalopram 15 milliGRAM(s) Oral daily  gabapentin Solution 300 milliGRAM(s) Oral two times a day  heparin   Injectable 7500 Unit(s) SubCutaneous every 12 hours  influenza  Vaccine (HIGH DOSE) 0.5 milliLiter(s) IntraMuscular once  lacosamide IVPB 100 milliGRAM(s) IV Intermittent every 12 hours  lamoTRIgine 100 milliGRAM(s) Oral two times a day  levETIRAcetam  IVPB 1500 milliGRAM(s) IV Intermittent two times a day  lidocaine   4% Patch 1 Patch Transdermal daily  lurasidone 40 milliGRAM(s) Oral daily  norepinephrine Infusion 0.05 MICROgram(s)/kG/Min (9.68 mL/Hr) IV Continuous <Continuous>  pantoprazole  Injectable 40 milliGRAM(s) IV Push daily  piperacillin/tazobactam IVPB.. 3.375 Gram(s) IV Intermittent every 12 hours  polyethylene glycol 3350 17 Gram(s) Oral daily  propofol Infusion 50 MICROgram(s)/kG/Min (31 mL/Hr) IV Continuous <Continuous>  senna Syrup 10 milliLiter(s) Oral at bedtime  sodium chloride 3%  Inhalation 4 milliLiter(s) Inhalation every 12 hours    MEDICATIONS  (PRN):  fentaNYL    Injectable 25 MICROGram(s) IV Push every 4 hours PRN Moderate Pain (4 - 6)  nystatin Powder 1 Application(s) Topical three times a day PRN fungal infection you may see and want to treat CHIEF COMPLAINT: Patient is a 69y old  Male who presents with a chief complaint of seizure, flu, elevated trop (24 Jan 2025 20:59)    Interval Events: Patient was diuresed yesterday with Bumex 4mg gtt with net negative by evening. Re-POCUS showing B lines throughout and plethoric IVC consistent with fluid overload. Creat 3.95. Given fluid overload, kept Bumex 4mg gtt overnight. This morning, patient's POCUS showed much fewer B lines.     REVIEW OF SYSTEMS: Intubated, sedated.     OBJECTIVE:  ICU Vital Signs Last 24 Hrs  T(C): 37.3 (25 Jan 2025 04:00), Max: 37.3 (24 Jan 2025 07:45)  T(F): 99.1 (25 Jan 2025 04:00), Max: 99.1 (24 Jan 2025 07:45)  HR: 90 (25 Jan 2025 06:00) (86 - 106)  BP: 99/56 (25 Jan 2025 06:00) (83/52 - 137/79)  BP(mean): 71 (25 Jan 2025 06:00) (61 - 101)  RR: 24 (25 Jan 2025 06:00) (14 - 31)  SpO2: 100% (25 Jan 2025 06:00) (93% - 100%)    O2 Parameters below as of 24 Jan 2025 20:00  Patient On (Oxygen Delivery Method): ventilator  O2 Concentration (%): 40    01-24 @ 07:01  -  01-25 @ 07:00  --------------------------------------------------------  IN: 1812.1 mL / OUT: 2805 mL / NET: -992.9 mL    Mode: AC/ CMV (Assist Control/ Continuous Mandatory Ventilation), RR (machine): 24, TV (machine): 450, FiO2: 30, PEEP: 8, ITime: 1, MAP: 14, PIP: 27    PHYSICAL EXAM:  General: Intubated, sedated, not much arousal today  HEENT: Normocephalic. Atraumatic. Normal icterus. MMM  Heart: Regular rate, rhythm. Distant sounds.   Lungs: faint crackles, mild wheeze, no distress.  GI: Soft, distended/obese, nontender  Neuro: Sedated, pupils appropriate  Ext: Mild edema bilaterally.   Skin: Warm, well-perfused     LABS:  POCT Blood Glucose.: 244 mg/dL (23 Jan 2025 13:23)    (01-25 @ 01:18)                        10.6  12.77 )-----------( 162                 33.1    Neutrophils = 11.29 (88.4%)  Lymphocytes = 0.61 (4.8%)  Eosinophils = 0.05 (0.4%)  Basophils = 0.01 (0.1%)  Monocytes = 0.67 (5.2%)  Bands = --%    WBC Trend: 12.77<--, 16.33<--, 13.21<--  Hb Trend: 10.6<--, 11.2<--, 12.0<--, 12.4<--, 10.3<--  Plt Trend: 162<--, 109<--, 135<--, 123<--, 106<--  01-25    139  |  98  |  74[H]  ----------------------------<  132[H]  4.0   |  17[L]  |  4.31[H]    Ca    9.3      25 Jan 2025 01:18  Phos  7.8     01-25  Mg     2.5     01-25    TPro  6.6  /  Alb  3.1[L]  /  TBili  0.6  /  DBili  x   /  AST  86[H]  /  ALT  107[H]  /  AlkPhos  140[H]  01-25  Creatinine Trend: 4.31<--, 3.92<--, 3.61<--, 2.95<--, 2.69<--, 2.09<--  PT/INR - ( 25 Jan 2025 01:18 )   PT: 10.9 sec;   INR: 0.96 ratio    PTT - ( 25 Jan 2025 01:18 )  PTT:30.3 sec  Urinalysis Basic - ( 25 Jan 2025 01:18 )  Color: x / Appearance: x / SG: x / pH: x  Gluc: 132 mg/dL / Ketone: x  / Bili: x / Urobili: x   Blood: x / Protein: x / Nitrite: x   Leuk Esterase: x / RBC: x / WBC x   Sq Epi: x / Non Sq Epi: x / Bacteria: x    Arterial Blood Gas:  01-25 @ 01:15  7.33/37/117/20/98.5/-5.9  ABG lactate: --  Arterial Blood Gas:  01-23 @ 23:58  7.30/38/159/19/97.7/-7.1  ABG lactate: --  Arterial Blood Gas:  01-23 @ 18:03  7.32/35/132/18/97.7/-7.3  ABG lactate: --  Arterial Blood Gas:  01-23 @ 14:55  7.30/38/85/19/95.1/-7.1  ABG lactate: --  Venous Blood Gas:  01-23 @ 18:26  7.23/51/35/21/45.4  VBG Lactate: 2.2    HOSPITAL MEDICATIONS:  MEDICATIONS  (STANDING):  albuterol/ipratropium for Nebulization 3 milliLiter(s) Nebulizer every 6 hours  atorvastatin 20 milliGRAM(s) Oral at bedtime  bacitracin   Ointment 1 Application(s) Topical two times a day  buMETAnide Infusion 4 mG/Hr (20 mL/Hr) IV Continuous <Continuous>  chlorhexidine 0.12% Liquid 15 milliLiter(s) Oral Mucosa every 12 hours  chlorhexidine 2% Cloths 1 Application(s) Topical <User Schedule>  chlorhexidine 2% Cloths 1 Application(s) Topical <User Schedule>  dexMEDEtomidine Infusion 0.5 MICROgram(s)/kG/Hr (12.9 mL/Hr) IV Continuous <Continuous>  escitalopram 15 milliGRAM(s) Oral daily  gabapentin Solution 300 milliGRAM(s) Oral two times a day  heparin   Injectable 7500 Unit(s) SubCutaneous every 12 hours  influenza  Vaccine (HIGH DOSE) 0.5 milliLiter(s) IntraMuscular once  lacosamide IVPB 100 milliGRAM(s) IV Intermittent every 12 hours  lamoTRIgine 100 milliGRAM(s) Oral two times a day  levETIRAcetam  IVPB 1500 milliGRAM(s) IV Intermittent two times a day  lidocaine   4% Patch 1 Patch Transdermal daily  lurasidone 40 milliGRAM(s) Oral daily  norepinephrine Infusion 0.05 MICROgram(s)/kG/Min (9.68 mL/Hr) IV Continuous <Continuous>  pantoprazole  Injectable 40 milliGRAM(s) IV Push daily  piperacillin/tazobactam IVPB.. 3.375 Gram(s) IV Intermittent every 12 hours  polyethylene glycol 3350 17 Gram(s) Oral daily  propofol Infusion 50 MICROgram(s)/kG/Min (31 mL/Hr) IV Continuous <Continuous>  senna Syrup 10 milliLiter(s) Oral at bedtime  sodium chloride 3%  Inhalation 4 milliLiter(s) Inhalation every 12 hours    MEDICATIONS  (PRN):  fentaNYL    Injectable 25 MICROGram(s) IV Push every 4 hours PRN Moderate Pain (4 - 6)  nystatin Powder 1 Application(s) Topical three times a day PRN fungal infection you may see and want to treat    IMAGING  ------------  MR BRAIN: Mild periventricular and moderate deep and subcortical white matter ischemia. 5.4 mm enhancing lesion in the LEFT middle cerebellar peduncle with associated edema suspicious for metastases.   Marked LEFT and mild RIGHT mucosal thickening within the BILATERAL mastoid air cells.    MRA NECK: No lymphadenopathy. 1.6 cm RIGHT thyroid nodule.4.2 cm RIGHT upper lobe mass/infiltrate. Mild BILATERAL maxillary sinus mucosal thickening and moderate RIGHT maxillary sinus secretions. Secretions are seen within the nasopharynx.    MR BACK: Posterior fusion extends from thoracic spine into the L1 level. The distal fusion rods appeared to be distal detached from the L1 lamina with free lamina hooks, recommend CT lumbar for complete evaluation. Lumbar discs intact.    US THYROID  1. Limited portable exam.  2. There is a 1.6 x 1.2 x 1.3 cm complex mixed solid cystic hypoechoic nodule in the lower pole of the right thyroid lobe, TIRADS 5.  FNA with ultrasound recommended. TI-RAD 5: Highly suspicious (FNA if > 1 cm, Follow if > 0.5 cm)  3. No other neck masses are identified on this limited portable exam.

## 2025-01-25 NOTE — PROGRESS NOTE ADULT - ASSESSMENT
ASSESSMENT AND PLAN:  69M w/ hx of metastatic testicular cancer (s/p L orchiectomy, on etoposide/cisplatin chemo, last dose 6/2024), epilepsy (grand-mal seizures), schizophrenia, developmental delay, HTN, HLD, VAZQUEZ, stage III CKD, obesity, secondary hyperparathyroidism, anemia, thrombocytopenia, ? spinal surgery who was admitted on 1/16/25 for fall and syncope. On 1/17 patient had multiple RRTs called for grand mal seizure activity and  patient had x2 more episode and was ultimately given ativan 2mg, Vimpat load 200mg, and intubated for airway protection with MICU transfer. Extubated 1/19 - to AVAPS, post extubation with increased secretion, now better; downgraded 1/21; While on medical floors; patient developed respiratory distress; now intubated for suspected aspiration pna vs flash pulmonary edema with new fever and leukocytosis.     CHANGES/UPDATES TODAY  ***    - Treating aspiration pneumonia with zosyn  - Treating flash edema / with bumex gtt  - Continuing AEDs as below and if seizure, ativan  - Weaning vent as able  - Ordered MR Head w/wo for seizure foci per neuro recs, 10pm  - Ordered MR Orbit/Face/Neck non con for further eval neck mass, 10pm    ===NEURO===  #Intubated #Sedated  - Sedation: Precedex, Propofol, wean as able   - Pain: PRN fentanyl 25mcg q4h, PRN Lidocaine patch 4%  - SATs as able    #Status Epilepticus  #Grand Mal Seizures  - AEDs: Keppra 1.5g BID, Lamotrigine 100mg BID, Gabapentin 300mg BID, Vimpat 100mg BID  - For Seizures: Ativan  - MRI brain w/wo to eval new foci iso metastastic dz  - Neuro following, appreciate recs  - s/p vEEG negative for seizures  - s/p CTH negative for acute findings    #Schizophrenia  #Depression  - Escitalopram 15mg daily  - Lurasidone 40mg daily    ===CVS===  #HTN #HLD  #?Flash Pulmonary Edema  #Normal EF (62%)  - Bumex 4mg/hr gtt  - Atorvastatin 20mg qhs  - GOAL: Net negative, Normotension    #Hypotension  - Norepi prn, wean as able    ===PULM===  #Respiratory Failure s/t ?Edema vs PNA  #VAZQUEZ on CPAP at home-->?AVAPS  - Duonebs 3mL q4h  - Saline Nebs 4mL BID  - Antibiotics as below  - Cont Vent, daily SBTs as able, wean as able  - Cont Bumex as above 4mg/hr gtt  - S/p Intubation 1/17 for status epilepticus  - S/p Extubation 1/23 to AVAPS    #Neck Mass 4mm nodule  #CT with ?Tracheomalacia  - US of neck, TSH, T3, T4  - MR Neck/Face, MR Brain scheduled already  - Likely outpt follow up    ===RENAL===  #Metabolic Acidosis  #SHAGUFTA on CKD Stage III  #Electrolytes  - As above, Bumex 4mg/hr gtt  - Trend labs, I/Os, normal lytes  - Nephrology following, appreciate recs    ===GI===  #Diet #OG Tube  #Aspiration risk  #Bowel Regimen  - Tube feeds  - Pantoprazole 40mg daily  - Senna, Miralax    ===ID===  #?Aspiration Pneumonia  #Pneumonia RLL, CAP, treated  #FluA+, treated  - Zosyn (1/23-?) empirically, if rash start cefepime  - BCx: 1/23(NGTD 24h), 1/19 (NGTD 4d)  - Sputum Cx: 1/23 (Pending), 1/18 (neg)  - Labs: MRSA (1/23 neg), Legionella (1/24 pending)  - S/p Ceftriaxone 1g daily (1/17-1/21) for CAP  - S/p Tamiflu 30mg BID (1/18-1/21) for FluA     ===ENDO===  #At risk of hypoglycemia  #Hx of secondary hyperparathyroidism  - FS q6h while NPO    ===HEME/ONC===  #Metastatic testicular cancer, recent chemo 06/2024  - MR Brain as above    #DVT ppx  - Heparin SQ 7500U    ===ETHICS===  #Code Status: FULL CODE.  - Palliative consulted, following    Signed,  Brittani Virgen PGY1  Emergency Medicine ASSESSMENT AND PLAN:  69M w/ hx of metastatic testicular cancer (s/p L orchiectomy, on etoposide/cisplatin chemo, last dose 6/2024), epilepsy (grand-mal seizures), schizophrenia, developmental delay, HTN, HLD, VAZQUEZ, stage III CKD, obesity, secondary hyperparathyroidism, anemia, thrombocytopenia, ? spinal surgery who was admitted on 1/16/25 for fall and syncope. On 1/17 patient had multiple RRTs called for grand mal seizure activity and  patient had x2 more episode and was ultimately given ativan 2mg, Vimpat load 200mg, and intubated for airway protection with MICU transfer. Extubated 1/19 - to AVAPS, post extubation with increased secretion, now better; downgraded 1/21; While on medical floors; patient developed respiratory distress; now intubated for suspected aspiration pna vs flash pulmonary edema with new fever and leukocytosis.     CHANGES/UPDATES TODAY  - MRI concerning for new/mets to brain, thyroid, and u    - MRI brain: Concern for new mets, neuro following, reached out to outpt onc  - MRI neck: Concern for concerning thyroid nodule and cyst, defer outpt/floor FNA  - MRI back: Rods detached from lamina with free hooks, ortho consulted/aware  - Stopped bumex gtt given improvement in fluid status, net negative, creat elevating  - Weaned vent settings: FiO2 (30% <--40%), PEEP (5<---8)        4.2 cm RIGHT upper lobe mass/infiltrate.    - Treating aspiration pneumonia with zosyn  - Treating flash edema / with bumex gtt  - Continuing AEDs as below and if seizure, ativan  - Weaning vent as able  - Ordered MR Head w/wo for seizure foci per neuro recs, 10pm  - Ordered MR Orbit/Face/Neck non con for further eval neck mass, 10pm    ===NEURO===  #Intubated #Sedated  - Sedation: Precedex, Propofol, wean as able   - Pain: PRN fentanyl 25mcg q4h, PRN Lidocaine patch 4%  - SATs as able    #Status Epilepticus  #Grand Mal Seizures  - AEDs: Keppra 1.5g BID, Lamotrigine 100mg BID, Gabapentin 300mg BID, Vimpat 100mg BID  - For Seizures: Ativan  - MRI brain w/wo to eval new foci iso metastastic dz  - Neuro following, appreciate recs  - s/p vEEG negative for seizures  - s/p CTH negative for acute findings    #Schizophrenia  #Depression  - Escitalopram 15mg daily  - Lurasidone 40mg daily    ===CVS===  #HTN #HLD  #?Flash Pulmonary Edema  #Normal EF (62%)  - Bumex 4mg/hr gtt  - Atorvastatin 20mg qhs  - GOAL: Net negative, Normotension    #Hypotension  - Norepi prn, wean as able    ===PULM===  #Respiratory Failure s/t ?Edema vs PNA  #VAZQUEZ on CPAP at home-->?AVAPS  - Duonebs 3mL q4h  - Saline Nebs 4mL BID  - Antibiotics as below  - Cont Vent, daily SBTs as able, wean as able  - Cont Bumex as above 4mg/hr gtt  - S/p Intubation 1/17 for status epilepticus  - S/p Extubation 1/23 to AVAPS    #Neck Mass 4mm nodule  #CT with ?Tracheomalacia  - US of neck, TSH, T3, T4  - MR Neck/Face, MR Brain scheduled already  - Likely outpt follow up    ===RENAL===  #Metabolic Acidosis  #SHAGUFTA on CKD Stage III  #Electrolytes  - As above, Bumex 4mg/hr gtt  - Trend labs, I/Os, normal lytes  - Nephrology following, appreciate recs    ===GI===  #Diet #OG Tube  #Aspiration risk  #Bowel Regimen  - Tube feeds  - Pantoprazole 40mg daily  - Senna, Miralax    ===ID===  #?Aspiration Pneumonia  #Pneumonia RLL, CAP, treated  #FluA+, treated  - Zosyn (1/23-?) empirically, if rash start cefepime  - BCx: 1/23(NGTD 24h), 1/19 (NGTD 4d)  - Sputum Cx: 1/23 (Pending), 1/18 (neg)  - Labs: MRSA (1/23 neg), Legionella (1/24 pending)  - S/p Ceftriaxone 1g daily (1/17-1/21) for CAP  - S/p Tamiflu 30mg BID (1/18-1/21) for FluA     ===ENDO===  #At risk of hypoglycemia  #Hx of secondary hyperparathyroidism  - FS q6h while NPO    ===HEME/ONC===  #Metastatic testicular cancer, recent chemo 06/2024  - MR Brain as above    #DVT ppx  - Heparin SQ 7500U    ===ETHICS===  #Code Status: FULL CODE.  - Palliative consulted, following    Signed,  Brittani Virgen PGY1  Emergency Medicine ASSESSMENT AND PLAN:  69M w/ hx of metastatic testicular cancer (s/p L orchiectomy, on etoposide/cisplatin chemo, last dose 6/2024), epilepsy (grand-mal seizures), schizophrenia, developmental delay, HTN, HLD, VAZQUEZ, stage III CKD, obesity, secondary hyperparathyroidism, anemia, thrombocytopenia, ? spinal surgery who was admitted on 1/16/25 for fall and syncope. On 1/17 patient had multiple RRTs called for grand mal seizure activity and  patient had x2 more episode and was ultimately given ativan 2mg, Vimpat load 200mg, and intubated for airway protection with MICU transfer. Extubated 1/19 - to AVAPS, post extubation with increased secretion, now better; downgraded 1/21; While on medical floors; patient developed respiratory distress; now intubated for suspected aspiration pna vs flash pulmonary edema with new fever and leukocytosis.     CHANGES/UPDATES TODAY  - Oncology (darren Ovalles colleague) contacted for possible MRI showing mets to brain, lung, thyroid  - Ortho now consulting for MRI showing a possible detachment of rods in back  - Stopped bumex gtt with most recent POCUS w/out B lines, collapsible IVC  - Weaned vent settings: FiO2 (30% <--40%), PEEP (5<---8)  - Treating aspiration pneumonia with zosyn  - Continuing AEDs as below and if seizure, ativan    ===NEURO===  #Intubated #Sedated  - Sedation: Precedex, Propofol, wean as able   - Pain: PRN fentanyl 25mcg q4h, PRN Lidocaine patch 4%  - SATs as able    #Status Epilepticus  #Grand Mal Seizures  #?MRI with mets vs abnl lesion  - AEDs: Keppra 1.5g BID, Lamotrigine 100mg BID, Gabapentin 300mg BID, Vimpat 100mg BID  - For Seizures: Ativan  - Neuro following, appreciate recs  - Onc as below  - s/p vEEG negative for seizures  - s/p CTH negative for acute findings    #Schizophrenia  #Depression  - Escitalopram 15mg daily  - Lurasidone 40mg daily    ===CVS===  #HTN #HLD  #?Flash Pulmonary Edema  #Normal EF (62%)  - Atorvastatin 20mg qhs  - GOAL: Net negative, Normotension    #Hypotension  - Norepi prn, wean as able    ===PULM===  #Respiratory Failure s/t ?Edema vs PNA  #VAZQUEZ on CPAP at home-->?AVAPS  - Duonebs 3mL q4h  - Saline Nebs 4mL BID  - Antibiotics as below  - Cont Vent, daily SBTs as able, wean as able  - S/p Intubation 1/17 for status epilepticus  - S/p Extubation 1/23 to AVAPS    ===RENAL===  #Metabolic Acidosis  #SHAGUFTA on CKD Stage III  #Electrolytes  - Monitor BP and urine output, if concerning may need fluids  - Dao Morris following patient and is available, much appreciated  - s/p bumex 4mg gtt with good effect    ===GI===  #Diet #OG Tube  #Aspiration risk  #Bowel Regimen  - Tube feeds  - Pantoprazole 40mg daily  - Senna, Miralax    ===ID===  #?Aspiration Pneumonia  #Pneumonia RLL, CAP, treated  #FluA+, treated  - Zosyn 3.375g BID (1/23-?) empirically, if rash start cefepime  - BCx: 1/23(NGTD), 1/19 (NGTD 4d)  - Sputum Cx: 1/23 (No sufficient sputum), 1/18 (neg)  - Labs: MRSA (1/23 neg), Legionella (1/24 neg), Strep Ag (1/24 neg)  - S/p Ceftriaxone 1g daily (1/17-1/21) for CAP  - S/p Tamiflu 30mg BID (1/18-1/21) for FluA     ===ENDO===  #At risk of hypoglycemia  #Hx of secondary hyperparathyroidism  - FS q6h while NPO    ===HEME/ONC===  #Metastatic testicular cancer  #Last chemo 06/2024 (etoposide/cisplatin)  #S/p L. Orchiectomy  #New Neck Nodule: US/MRI with concern for thyroid cancer TIRADS 5  #New Lung Nodule: 4cm on MRI and additional nodule on CT 4mm   #New Brain Enhancing Lesion: 5.4mm lesion in white matter near ventricles  - Dr. Franki Ovalles ([facility]) aware, will have colleague round  - Awaiting recommendations    #DVT ppx  - Heparin SQ 7500U    ===ETHICS===  #Code Status: FULL CODE.  - Palliative consulted, following    Signed,  Brittani Virgen PGY1  Emergency Medicine

## 2025-01-25 NOTE — PROGRESS NOTE ADULT - ASSESSMENT
Patient is a 69 year old male with PMH of metastatic testicular cancer (s/p L orchiectomy, on etoposide/cisplatin chemo, last dose 6/2024), epilepsy (grand-mal seizures), schizophrenia, developmental delay, HTN, HLD, VAZQUEZ, stage III CKD, severe obesity, secondary hyperparathyroidism, anemia, thrombocytopenia who presented to ED for fall, possible seizure prior. Patient reported cough for few weeks with no other respiratory symptoms.   Admitted for further work up for syncope, c/f seizure   Influenza A  1/17 s/p RRTs for grand mal seizure activity, s/p intubation and transfer to MICU  - 1/17 CT chest with anterior bowing of posterior tracheal wall suggestive of tracheomalacia, trace R pleural effusion   - MRSA PCR screen negative    - sputum culture negative    - s/p extubation 1/19   - 1/20 Bcx negative    - s/p ceftriaxone 1/17-1/21   - s/p tamiflu 1/17-1/21    Sepsis/sirs, AHRF possible flash pulmonary edema iso uncontrolled HTN, c/f aspiration pna   cxr right middle lobe opacity- edema or pna   - 1/23 s/p RRT am for hypoxia/inc WOB, febrile last night 100.6F and now 101F this am, tachycardia, leukocytosis 11k   - repeat COVID/Flu/RSV with known influenza A-likely shedding    - reported occ dysuria, UA negative for pyuria   - 1/23 afternoon s/p RRT for inc WOB on AVAPS, s/p intubation and transfer to MICU, required pressor support  - MRSA PCR screen negative   - 1/24 afebrile overnight, WBC uptrending, weaned off pressor earlier in am, on zosyn   -1/25 afeb cxr right infiltrate blood cx remain neg wbc down, afebrile      Recommendations:   mri brain with metastatic disease  thryoid us with nodule    Send tracheal aspirate culture  Continue empiric zosyn - tolerating day 3     Diuresis as tolerated, on bumex ggt   Monitor temps/WBC  Aspiration precautions   Continue rest of care per primary team       [facility]  Available on [employer] TEAMS - *PREFERRED*  [phone]  After 5pm on weekdays and all day on weekends - please call [phone]     Thank you for consulting us and involving us in the management of this patients case. In addition to reviewing history, imaging, documents, labs, microbiology, took into account antibiotic stewardship, local antibiogram and infection control strategies and potential transmission issues.

## 2025-01-25 NOTE — PROGRESS NOTE ADULT - SUBJECTIVE AND OBJECTIVE BOX
[facility]  Infectious Disease  Dr Liu, Dr Jurado, Dr Edge, SANYA Gray Tao  [phone]  after hours and weekends [phone]    Name: OSCAR MILAN  Age: 69y  Gender: Male  MRN: [id number]    Interval History--  Notes reviewed  intubated sedated    Allergies    penicillin (Hives)  seasonal allergies (Unknown)    Intolerances    latex (Rash)      Medications--  Antibiotics:  piperacillin/tazobactam IVPB.. 3.375 Gram(s) IV Intermittent every 12 hours    Immunologic:  influenza  Vaccine (HIGH DOSE) 0.5 milliLiter(s) IntraMuscular once    Other:  albuterol/ipratropium for Nebulization  atorvastatin  bacitracin   Ointment  buMETAnide Infusion  chlorhexidine 0.12% Liquid  chlorhexidine 2% Cloths  chlorhexidine 2% Cloths  dexMEDEtomidine Infusion  escitalopram  fentaNYL    Injectable PRN  gabapentin Solution  heparin   Injectable  lacosamide IVPB  lamoTRIgine  levETIRAcetam  IVPB  lidocaine   4% Patch  lurasidone  norepinephrine Infusion  nystatin Powder PRN  pantoprazole  Injectable  polyethylene glycol 3350  propofol Infusion  senna Syrup  sodium chloride 3%  Inhalation      Review of Systems--  A 10-point review of systems was obtained.     unable to obtain     Review of systems otherwise negative except as previously noted.    Physical Examination--  Vital Signs: T(F): 99 (01-25-25 @ 07:00), Max: 99.1 (01-24-25 @ 20:00)  HR: 86 (01-25-25 @ 08:44)  BP: 100/64 (01-25-25 @ 07:00)  RR: 24 (01-25-25 @ 07:00)  SpO2: 100% (01-25-25 @ 08:44)  Wt(kg): --  General: vent sedated  HEENT: AT/NC. ett  Lungs: Clear bilaterally without rales, wheezing or rhonchi  Heart: Regular rate and rhythm.   Abdomen: Bowel sounds present and normoactive. Soft.   Extremities: No cyanosis or clubbing. + edema piv   Skin: Warm. Dry. Good turgor. No rash. No vasculitic stigmata.          Laboratory Studies--  CBC                        10.6   12.77 )-----------( 162      ( 25 Jan 2025 01:18 )             33.1       Chemistries  01-25    139  |  98  |  74[H]  ----------------------------<  132[H]  4.0   |  17[L]  |  4.31[H]    Ca    9.3      25 Jan 2025 01:18  Phos  7.8     01-25  Mg     2.5     01-25    TPro  6.6  /  Alb  3.1[L]  /  TBili  0.6  /  DBili  x   /  AST  86[H]  /  ALT  107[H]  /  AlkPhos  140[H]  01-25      Culture Data    Culture - Blood (collected 23 Jan 2025 15:14)  Source: .Blood BLOOD  Preliminary Report (24 Jan 2025 20:01):    No growth at 24 hours    Culture - Blood (collected 23 Jan 2025 15:14)  Source: .Blood BLOOD  Preliminary Report (24 Jan 2025 20:01):    No growth at 24 hours    Culture - Blood (collected 23 Jan 2025 12:21)  Source: .Blood BLOOD  Preliminary Report (24 Jan 2025 17:01):    No growth at 24 hours    Culture - Blood (collected 23 Jan 2025 12:21)  Source: .Blood BLOOD  Preliminary Report (24 Jan 2025 17:01):    No growth at 24 hours    Culture - Blood (collected 23 Jan 2025 00:15)  Source: .Blood BLOOD  Preliminary Report (25 Jan 2025 04:01):    No growth at 48 Hours    Culture - Blood (collected 23 Jan 2025 00:10)  Source: .Blood BLOOD  Preliminary Report (25 Jan 2025 04:01):    No growth at 48 Hours    Culture - Blood (collected 20 Jan 2025 01:52)  Source: .Blood BLOOD  Final Report (25 Jan 2025 09:00):    No growth at 5 days    Culture - Blood (collected 19 Jan 2025 19:28)  Source: .Blood BLOOD  Final Report (25 Jan 2025 04:00):    No growth at 5 days        < from: MR Orbit, Face, and/or Neck w/wo IV Cont (01.25.25 @ 04:18) >      IMPRESSION:    MR brain: Mild periventricular and moderate deep and subcortical white   matter ischemia. 5.4 mm enhancing lesion in the LEFT middle cerebellar   peduncle with associated edema suspicious for metastases.    Marked LEFT   and mild RIGHT mucosal thickening within the BILATERAL mastoid air cells.    MRA NECK: No lymphadenopathy. 1.6 cm RIGHT thyroid nodule.4.2 cm RIGHT   upper lobe mass/infiltrate. Mild BILATERAL maxillary sinus mucosal   thickening and moderate RIGHT maxillary sinus secretions. Secretions are   seen within the nasopharynx.    < end of copied text >  < from: MR Lumbar Spine No Cont (01.24.25 @ 22:40) >  visualized but are intact.  CONUS AND CAUDA EQUINA: The distal cord and conus are normal in signal.   Conus terminates at L1.  VISUALIZED INTRAPELVIC/INTRA-ABDOMINAL SOFT TISSUES: Normal.  PARASPINAL SOFT TISSUES: Normal.      INDIVIDUAL LEVELS:    LOWER THORACIC SPINE: Subluxation at T11-T12 with posterior fixation.    L1-L2: No spinal canal or neuroforaminal stenosis.  L2-L3: No spinal canal or neuroforaminal stenosis.  L3-L4: No spinal canal or neuroforaminalstenosis.  L4-L5: No spinal canal or neuroforaminal stenosis.  L5-S1: No spinal canal or neuroforaminal stenosis.      IMPRESSION:    Posterior fusion extends from thoracic spine into the L1 level. The   distal fusion rods appeared to be distal detached from the L1 lamina with   free lamina hooks, recommend CT lumbar for complete evaluation. Lumbar   discs intact.    < end of copied text >  < from: US Thyroid + Parathyroid (01.24.25 @ 12:05) >  Cervical Lymph Nodes: No enlarged or abnormal morphology cervical nodes.    IMPRESSION:  1. Limited portable exam.  2. There is a 1.6 x 1.2 x 1.3 cm complex mixed solid cystic hypoechoic   nodule in the lower pole of the right thyroid lobe, TIRADS 5.  Further   evaluation of this nodule withfine-needle aspiration biopsy under   ultrasound guidance to target the solid components is advised.  3. No other neck masses are identified on this limited portable exam.   Suggest clinical correlation. If neck mass remains of clinical concern,   further assessment with CT or MRI of the neck is suggested.      TI-RAD 5: Highly suspicious (FNA if > 1 cm, Follow if > 0.5 cm)  __________________________________  ACR Thyroid Imaging, Reporting and Data System (TI-RADS): White Paper of   the ACR TI-RADS Committee. J Am Gonsalo Radiol 2017;14:587-595.    TI-RAD 1: Benign (No FNA)  TI-RAD 2: Not suspicious (No FNA)  TI-RAD 3: Mildly suspicious (FNA if > 2.5 cm, Follow if >1.5 cm)  TI-RAD 4: Moderately suspicious (FNA if > 1.5 cm, Follow if > 1 cm)  TI-RAD 5: Highly suspicious (FNA if > 1 cm, Follow if > 0.5 cm)    Findings were discussed with DARIAN SONG [phone] 1/24/2025   6:56 PM by Dr. Snowden with read back confirmation.    < end of copied text >

## 2025-01-25 NOTE — CONSULT NOTE ADULT - ASSESSMENT
Mr. Gr is a 69 year old male with PMHx of seizure disorder, sleep apnea, HTN, chronic idiopathic constipation, stage III CKD, severe obesity, secondary hyperparathyroidism, anemia, thrombocytopenia, hyperlipidemia, testicular cancer s/p EP x 4 under the care of Dr. Ovalles who is admitted s/p fall in setting of possible seizure since 01/16/2025. He was intubated early in his admission due to seziures and extubated 1/21/2025 and then again intubated in setting of respiratory distress, requiring diuresis, with SHAGUFTA on CKD, in the MICU. Imaging shows possible brain metastatic disease and lung mass as well as abnormal thyroid nodule.     Former smoker, quit 14y prior, denied ETOH, drug use. Lives at home. Does not have children. Denied family history of blood disorders or malignancy.  Denied previous workplace related exposures.  Denied previous history of blood transfusions.  Denied history of thromboses.  Denied history of  requiring anticoagulation.        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  Margins were negative.  pT3Nx. CT C/A/P with few left para-aortic and left iliac chain lymph nodes largest measuring 8.1 cm left para-aortic node, bilateral subcentimeter noncalcified pulmonary nodules measuring up to 7 mm. AFP, LDH, hCG were all elevated. 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 Completed four cycles of adjuvant therapy with Cisplatin+Etoposide, with cisplatin dose reduced 50% and Etoposide 50% due to thrombocytopenia at that time. Subsequent scans 08/2024 showed resolution of disease and negative markers,        # Brain lesion  # Seizures  -Seizures noted, pt with hx of seizures as well  - Neurology following  - MRI brain now with 5.4 mm enhancing lesion in the LEFT middle cerebellar peduncle with associated edema suspicious for metastases  - MRI Lumbar negative for acute disease  - Small lesion without mass effect or edema, recommend to correlate per neurology if foci and locus are concordant with seizure activity  - It is rare, but nonetheless not impossible, for testicular cancer to be metastatic to the brain  - He will need another PET-CT, can be completed outpatient once stable  - Endocrinology eval for thyroid nodule  - Obtain repeat markers including LDH and uric acid, AFP, BHCG quant        # Thyroid nodule  - US Thyroid 01/24/2025 with 1.6cm complex mixed solid cystic hypoechoic nodule in the lower pole of the right thyroid lobe, TIRADS 5.  Further evaluation of this nodule with fine-needle aspiration biopsy under ultrasound guidance to target the solid components is advised  - Obtain TSH, T4  -Endocrinology eval, Dr. 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 Ovalles 12/03/2024, markers continued to be negative  - See above in regards to brain lesion  - Follow up as outpatient with Franki Ovalles MD     # Thrombocytopenia  - Labile  - Coags wnl, LFTs elevated  - Continue to trend  - Transfuse to maintain >10k, if actively bleeding then maintain >50k     # Acute kidney injury on CKD Stage IIIA  - Worsening    - Likely multifactorial at this point  - Nephrology consult and recs noted  - Continue to trend     # Leukocytosis, Neutrophilia  -Likely reactive  -Continue to trend     # Normocytic anemia  -  Chronic, has hx of PRBC during chemo 07/2024  - Noted Anti E, Anti-K Anti-C antibodies previously  - Monitor for bleeding  - Recommend to transfuse to maintain Hb > 7        Thank you for allowing me to participate in the care Mr. Gr, please do not hesitate to call or text me if you have further questions or concerns.        Brayan Mart MD  [facility]   Division of Hematology/Oncology  [address] [place] [address], [address]  [place] [address]  P: [phone]  F: [phone]    Attestation:   Total time spent on the encounter: >** minutes   ----Including face-to-face interaction in addition to chart review, reviewing treatment plan, and managing the patient’s chronic diagnoses as listed in the assessment----     1.	I have reviewed, analyzed and interpreted the following labs: CBC, CMP, imaging:  MRI Brain, L spine, CT Chest w/o,  impressions as above.   2.	I have reviewed notes stating pts current admission, consultants and follow ups.   3.	I have reviewed the past medical, family, and surgical history and confirmed with outpatient records  Mr. Gr is a 69 year old male with PMHx of seizure disorder, sleep apnea, HTN, chronic idiopathic constipation, stage III CKD, severe obesity, secondary hyperparathyroidism, anemia, thrombocytopenia, hyperlipidemia, testicular cancer s/p EP x 4 under the care of Dr. Ovalles who is admitted s/p fall in setting of possible seizure since 01/16/2025. He was intubated early in his admission due to seziures and extubated 1/21/2025 and then again intubated in setting of respiratory distress, requiring diuresis, with SHAGUFTA on CKD, in the MICU. Imaging shows possible brain metastatic disease and lung mass as well as abnormal thyroid nodule.     Former smoker, quit 14y prior, denied ETOH, drug use. Lives at home. Does not have children. Denied family history of blood disorders or malignancy.  Denied previous workplace related exposures.  Denied previous history of blood transfusions.  Denied history of thromboses.  Denied history of  requiring anticoagulation.        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  Margins were negative.  pT3Nx. CT C/A/P with few left para-aortic and left iliac chain lymph nodes largest measuring 8.1 cm left para-aortic node, bilateral subcentimeter noncalcified pulmonary nodules measuring up to 7 mm. AFP, LDH, hCG were all elevated. 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 Completed four cycles of adjuvant therapy with Cisplatin+Etoposide, with cisplatin dose reduced 50% and Etoposide 50% due to thrombocytopenia at that time. Subsequent scans 08/2024 showed resolution of disease and negative markers,        # Brain lesion  # Seizures  -Seizures noted, pt with hx of seizures as well  - Neurology following  - MRI brain now with 5.4 mm enhancing lesion in the LEFT middle cerebellar peduncle with associated edema suspicious for metastases  - MRI Lumbar negative for acute disease  - Small lesion without mass effect or edema, recommend to correlate per neurology if foci and locus are concordant with seizure activity  - It is rare, but nonetheless not impossible, for testicular cancer to be metastatic to the brain  - He will need another PET-CT, can be completed outpatient once stable  - Endocrinology eval for thyroid nodule  - Obtain repeat markers including LDH and uric acid, AFP, BHCG quant        # Thyroid nodule  - US Thyroid 01/24/2025 with 1.6cm complex mixed solid cystic hypoechoic nodule in the lower pole of the right thyroid lobe, TIRADS 5.  Further evaluation of this nodule with fine-needle aspiration biopsy under ultrasound guidance to target the solid components is advised  - Obtain TSH, T4  -Endocrinology eval, Dr. 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 Ovalles 12/03/2024, markers continued to be negative  - See above in regards to brain lesion  - Follow up as outpatient with Franki Ovalles MD     # Thrombocytopenia  - Labile  - Coags wnl, LFTs elevated  - Continue to trend  - Transfuse to maintain >10k, if actively bleeding then maintain >50k     # Acute kidney injury on CKD Stage IIIA  - Worsening    - Likely multifactorial at this point  - Nephrology consult and recs noted  - Continue to trend     # Leukocytosis, Neutrophilia  -Likely reactive  -Continue to trend     # Normocytic anemia  -  Chronic, has hx of PRBC during chemo 07/2024  - Noted Anti E, Anti-K Anti-C antibodies previously  - Monitor for bleeding  - Recommend to transfuse to maintain Hb > 7        Thank you for allowing me to participate in the care Mr. Gr, please do not hesitate to call or text me if you have further questions or concerns.        Brayan Mart MD  [facility]   Division of Hematology/Oncology  [address] [place] [address], [address]  [place] [address]  P: [phone]  F: [phone]    Attestation:   Total time spent on the encounter: >60 minutes   ----Including face-to-face interaction in addition to chart review, reviewing treatment plan, and managing the patient’s chronic diagnoses as listed in the assessment----     1.	I have reviewed, analyzed and interpreted the following labs: CBC, CMP, imaging:  MRI Brain, L spine, CT Chest w/o,  impressions as above.   2.	I have reviewed notes stating pts current admission, consultants and follow ups.   3.	I have reviewed the past medical, family, and surgical history and confirmed with outpatient records  Mr. Gr is a 69 year old male with PMHx of seizure disorder, sleep apnea, HTN, chronic idiopathic constipation, stage III CKD, severe obesity, secondary hyperparathyroidism, anemia, thrombocytopenia, hyperlipidemia, testicular cancer s/p EP x 4 under the care of Dr. Ovalles who is admitted s/p fall in setting of possible seizure since 01/16/2025. He was intubated early in his admission due to seziures and extubated 1/21/2025 and then again intubated in setting of respiratory distress, requiring diuresis, with SHAGUFTA on CKD, in the MICU. Imaging shows possible brain metastatic disease and lung mass as well as abnormal thyroid nodule.     Former smoker, quit 14y prior, denied ETOH, drug use. Lives at home. Does not have children. Denied family history of blood disorders or malignancy.  Denied previous workplace related exposures.  Denied previous history of blood transfusions.  Denied history of thromboses.  Denied history of  requiring anticoagulation.        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  Margins were negative.  pT3Nx. CT C/A/P with few left para-aortic and left iliac chain lymph nodes largest measuring 8.1 cm left para-aortic node, bilateral subcentimeter noncalcified pulmonary nodules measuring up to 7 mm. AFP, LDH, hCG were all elevated. 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 Completed four cycles of adjuvant therapy with Cisplatin+Etoposide, with cisplatin dose reduced 50% and Etoposide 50% due to thrombocytopenia at that time. Subsequent scans 08/2024 showed resolution of disease and negative markers,        # Brain lesion  # Seizures  -Seizures noted, pt with hx of seizures as well  - Neurology following  - MRI brain now with 5.4 mm enhancing lesion in the LEFT middle cerebellar peduncle with associated edema suspicious for metastases  - MRI Lumbar negative for acute disease  - Small lesion without mass effect or edema, recommend to correlate per neurology if foci and locus are concordant with seizure activity  - It is rare, but nonetheless not impossible, for testicular cancer to be metastatic to the brain  - He will need another PET-CT, can be completed outpatient once stable  - Endocrinology eval for thyroid nodule  - Obtain repeat markers including LDH and uric acid, AFP, BHCG quant        # Thyroid nodule  - US Thyroid 01/24/2025 with 1.6cm complex mixed solid cystic hypoechoic nodule in the lower pole of the right thyroid lobe, TIRADS 5.  Further evaluation of this nodule with fine-needle aspiration biopsy under ultrasound guidance to target the solid components is advised  - Obtain TSH, T4  -Endocrinology eval, Dr. 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 Ovalles 12/03/2024, markers continued to be negative  - See above in regards to brain lesion  - MRI Neck shows .4.2 cm RIGHT upper lobe mass/infiltrate  - Recommend IR guided biopsy of RUL mass, currently intubated and sedate likely not possible  - Follow up as outpatient with Franki Ovalles MD     # Thrombocytopenia  - Labile  - Coags wnl, LFTs elevated  - Continue to trend  - Transfuse to maintain >10k, if actively bleeding then maintain >50k     # Acute kidney injury on CKD Stage IIIA  - Worsening    - Likely multifactorial at this point  - Nephrology consult and recs noted  - Continue to trend     # Leukocytosis, Neutrophilia  -Likely reactive  -Continue to trend     # Normocytic anemia  -  Chronic, has hx of PRBC during chemo 07/2024  - Noted Anti E, Anti-K Anti-C antibodies previously  - Monitor for bleeding  - Recommend to transfuse to maintain Hb > 7        Thank you for allowing me to participate in the care Mr. Gr, please do not hesitate to call or text me if you have further questions or concerns.        Brayan Mart MD  [facility] [place]   Division of Hematology/Oncology  [facility], [address]  [place] [address]  P: [phone]  F: [phone]    Attestation:   Total time spent on the encounter: >60 minutes   ----Including face-to-face interaction in addition to chart review, reviewing treatment plan, and managing the patient’s chronic diagnoses as listed in the assessment----     1.	I have reviewed, analyzed and interpreted the following labs: CBC, CMP, imaging:  MRI Brain, L spine, CT Chest w/o,  impressions as above.   2.	I have reviewed notes stating pts current admission, consultants and follow ups.   3.	I have reviewed the past medical, family, and surgical history and confirmed with outpatient records

## 2025-01-25 NOTE — CONSULT NOTE ADULT - ASSESSMENT
69y old Male with history of Seizure disorder, VAZQUEZ, HTN, Stage III CKD, Thrombocytopenia, Testicular cancer admitted for fall and syncope complicated by grand mal seizures requiring intubation. MRI brain concerning for metastatic lesions.     1. Thyroid nodule  - Images reviewed, nodule is hypoechoic measuring 1.6 cm, mostly smooth borders, +echogenic foci  - Nodule should be biopsied however can be done as an outpatient  - Check TSH/FT4    2. Metastatic brain lesions  - low likelihood that it's related to the thyroid nodule as brain metastases is extremely rare in thyroid cancer  - oncology following    Obed Anguiano MD  [facility] of End[facility]in[facility]    [address] [place] [address]    T [phone]  F [phone]   69y old Male with history of Seizure disorder, VAZQUEZ, HTN, Stage III CKD, Thrombocytopenia, Testicular cancer admitted for fall and syncope complicated by grand mal seizures requiring intubation. MRI brain concerning for metastatic lesions.     1. Thyroid nodule  - Images reviewed, nodule is hypoechoic measuring 1.6 cm, mostly smooth borders, +echogenic foci  - Nodule should be biopsied and can be done inpatient if the patient becomes more clinically stable vs outpatient  - Check TSH/FT4    2. Metastatic brain lesions  - low likelihood that it's related to the thyroid nodule as brain metastases is extremely rare in thyroid cancer  - oncology following    Obed Anguiano MD  [facility]    [address] [place] [address]    T [phone]  F [phone]

## 2025-01-25 NOTE — CONSULT NOTE ADULT - SUBJECTIVE AND OBJECTIVE BOX
[facility] CONSULT NOTE     HPI:  Mr. Gr is a 69 year old male with PMHx of seizure disorder, sleep apnea, HTN, chronic idiopathic constipation, stage III CKD, severe obesity, secondary hyperparathyroidism, anemia, thrombocytopenia, hyperlipidemia, testicular cancer under treatment with Dr. Ovalles who is admitted for fall and syncope. Hospital course complicated by grand mal seizures requiring intubation. MRI brain concerning for new metastatic lesions.     Former smoker, quit 14y prior, denied ETOH, drug use. Lives at home. Does not have children. Denies family history of blood disorders or malignancy.  Denies previous workplace related exposures.  Denies previous history of blood transfusions.  Denied history of thromboses.  Denied history of  requiring anticoagulation.     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  Margins were negative.  pT3Nx. CT C/A/P with few left para-aortic and left iliac chain lymph nodes largest measuring 8.1 cm left para-aortic node, bilateral subcentimeter noncalcified pulmonary nodules measuring up to 7 mm. AFP, LDH, hCG were all elevated. 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 Started on adjuvant therapy with Cisplatin+Etoposide, so far completed 4 cycles of treatment with cisplatin dose reduced 50% and Etoposide 50% due to thrombocytopenia.    ROS: pertinent positives and negatives as per HPI    Allergies: penicillin (Hives)  seasonal allergies (Unknown)    PMHx:  Hypertension, unspecified type  Other hyperlipidemia  History of epilepsy  Paranoid schizophrenia  Obstructive sleep apnea  Testicular cancer    SurgHx:   H/O Spinal surgery    FHx:     SocHx:     Meds:   MEDICATIONS  (STANDING):  albuterol/ipratropium for Nebulization 3 milliLiter(s) Nebulizer every 6 hours  atorvastatin 20 milliGRAM(s) Oral at bedtime  bacitracin   Ointment 1 Application(s) Topical two times a day  chlorhexidine 0.12% Liquid 15 milliLiter(s) Oral Mucosa every 12 hours  chlorhexidine 2% Cloths 1 Application(s) Topical <User Schedule>  chlorhexidine 2% Cloths 1 Application(s) Topical <User Schedule>  dexMEDEtomidine Infusion 0.5 MICROgram(s)/kG/Hr (12.9 mL/Hr) IV Continuous <Continuous>  escitalopram 15 milliGRAM(s) Oral daily  gabapentin Solution 300 milliGRAM(s) Oral two times a day  heparin   Injectable 7500 Unit(s) SubCutaneous every 12 hours  influenza  Vaccine (HIGH DOSE) 0.5 milliLiter(s) IntraMuscular once  lacosamide IVPB 100 milliGRAM(s) IV Intermittent every 12 hours  lamoTRIgine 100 milliGRAM(s) Oral two times a day  levETIRAcetam  IVPB 1500 milliGRAM(s) IV Intermittent two times a day  lidocaine   4% Patch 1 Patch Transdermal daily  lurasidone 40 milliGRAM(s) Oral daily  norepinephrine Infusion 0.05 MICROgram(s)/kG/Min (9.68 mL/Hr) IV Continuous <Continuous>  pantoprazole  Injectable 40 milliGRAM(s) IV Push daily  piperacillin/tazobactam IVPB.. 3.375 Gram(s) IV Intermittent every 12 hours  polyethylene glycol 3350 17 Gram(s) Oral daily  propofol Infusion 50 MICROgram(s)/kG/Min (31 mL/Hr) IV Continuous <Continuous>  senna Syrup 10 milliLiter(s) Oral at bedtime  sodium chloride 3%  Inhalation 4 milliLiter(s) Inhalation every 12 hours    MEDICATIONS  (PRN):  fentaNYL    Injectable 25 MICROGram(s) IV Push every 4 hours PRN Moderate Pain (4 - 6)  nystatin Powder 1 Application(s) Topical three times a day PRN fungal infection you may see and want to treat    Vital Signs  T(C): 37.1 (01-25-25 @ 12:00), Max: 37.3 (01-24-25 @ 20:00)  T(F): 98.8 (01-25-25 @ 12:00), Max: 99.1 (01-24-25 @ 20:00)  HR: 87 (01-25-25 @ 13:00) (86 - 106)  BP: 108/62 (01-25-25 @ 13:00) (87/57 - 137/79)  RR: 24 (01-25-25 @ 13:00) (24 - 31)  SpO2: 100% (01-25-25 @ 13:00) (93% - 100%)    Physical Exam:  Gen:   HEENT:   Chest:   Cardiac:  Abd:   Ext:   Neuro:   Integument:     Labs:  CBC Full  -  ( 25 Jan 2025 01:18 )  WBC Count : 12.77 K/uL  RBC Count : 3.39 M/uL  Hemoglobin : 10.6 g/dL  Hematocrit : 33.1 %  Platelet Count - Automated : 162 K/uL  Mean Cell Volume : 97.6 fl  Mean Cell Hemoglobin : 31.3 pg  Mean Cell Hemoglobin Concentration : 32.0 g/dL  Auto Neutrophil # : 11.29 K/uL  Auto Lymphocyte # : 0.61 K/uL  Auto Monocyte # : 0.67 K/uL  Auto Eosinophil # : 0.05 K/uL  Auto Basophil # : 0.01 K/uL  Auto Neutrophil % : 88.4 %  Auto Lymphocyte % : 4.8 %  Auto Monocyte % : 5.2 %  Auto Eosinophil % : 0.4 %  Auto Basophil % : 0.1 %    01-25    139  |  97  |  81[H]  ----------------------------<  107[H]  3.7   |  18[L]  |  4.70[H]    Ca    8.9      25 Jan 2025 11:31  Phos  7.9     01-25  Mg     2.6     01-25    TPro  6.2  /  Alb  2.9[L]  /  TBili  0.5  /  DBili  x   /  AST  103[H]  /  ALT  105[H]  /  AlkPhos  141[H]  01-25    PT/INR - ( 25 Jan 2025 01:18 )   PT: 10.9 sec;   INR: 0.96 ratio         PTT - ( 25 Jan 2025 01:18 )  PTT:30.3 sec  Bilirubin Total: 0.5 mg/dL (01-25-25 @ 11:31)  Bilirubin Total: 0.6 mg/dL (01-25-25 @ 01:18)  Bilirubin Total: 0.6 mg/dL (01-24-25 @ 17:15)   [facility] CONSULT NOTE     HPI:  Mr. Gr is a 69 year old male with PMHx of seizure disorder, sleep apnea, HTN, chronic idiopathic constipation, stage III CKD, severe obesity, secondary hyperparathyroidism, anemia, thrombocytopenia, hyperlipidemia, testicular cancer under treatment with Dr. Ovalles who is admitted for fall and syncope. Hospital course complicated by grand mal seizures requiring intubation. MRI brain concerning for new metastatic lesions.     Former smoker, quit 14y prior, denied ETOH, drug use. Lives at home. Does not have children. Denies family history of blood disorders or malignancy.  Denies previous workplace related exposures.  Denies previous history of blood transfusions.  Denied history of thromboses.  Denied history of  requiring anticoagulation.     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  Margins were negative.  pT3Nx. CT C/A/P with few left para-aortic and left iliac chain lymph nodes largest measuring 8.1 cm left para-aortic node, bilateral subcentimeter noncalcified pulmonary nodules measuring up to 7 mm. AFP, LDH, hCG were all elevated. 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 Started on adjuvant therapy with Cisplatin+Etoposide, so far completed 4 cycles of treatment with cisplatin dose reduced 50% and Etoposide 50% due to thrombocytopenia.    ROS: pertinent positives and negatives as per HPI    Allergies: penicillin (Hives)  seasonal allergies (Unknown)    PMHx:  Hypertension, unspecified type  Other hyperlipidemia  History of epilepsy  Paranoid schizophrenia  Obstructive sleep apnea  Testicular cancer    SurgHx:   H/O Spinal surgery    SocHx:   Former smoker  Denies ETOH  Denied drug use  Lives at home    Meds:   MEDICATIONS  (STANDING):  albuterol/ipratropium for Nebulization 3 milliLiter(s) Nebulizer every 6 hours  atorvastatin 20 milliGRAM(s) Oral at bedtime  bacitracin   Ointment 1 Application(s) Topical two times a day  chlorhexidine 0.12% Liquid 15 milliLiter(s) Oral Mucosa every 12 hours  chlorhexidine 2% Cloths 1 Application(s) Topical <User Schedule>  chlorhexidine 2% Cloths 1 Application(s) Topical <User Schedule>  dexMEDEtomidine Infusion 0.5 MICROgram(s)/kG/Hr (12.9 mL/Hr) IV Continuous <Continuous>  escitalopram 15 milliGRAM(s) Oral daily  gabapentin Solution 300 milliGRAM(s) Oral two times a day  heparin   Injectable 7500 Unit(s) SubCutaneous every 12 hours  influenza  Vaccine (HIGH DOSE) 0.5 milliLiter(s) IntraMuscular once  lacosamide IVPB 100 milliGRAM(s) IV Intermittent every 12 hours  lamoTRIgine 100 milliGRAM(s) Oral two times a day  levETIRAcetam  IVPB 1500 milliGRAM(s) IV Intermittent two times a day  lidocaine   4% Patch 1 Patch Transdermal daily  lurasidone 40 milliGRAM(s) Oral daily  norepinephrine Infusion 0.05 MICROgram(s)/kG/Min (9.68 mL/Hr) IV Continuous <Continuous>  pantoprazole  Injectable 40 milliGRAM(s) IV Push daily  piperacillin/tazobactam IVPB.. 3.375 Gram(s) IV Intermittent every 12 hours  polyethylene glycol 3350 17 Gram(s) Oral daily  propofol Infusion 50 MICROgram(s)/kG/Min (31 mL/Hr) IV Continuous <Continuous>  senna Syrup 10 milliLiter(s) Oral at bedtime  sodium chloride 3%  Inhalation 4 milliLiter(s) Inhalation every 12 hours    MEDICATIONS  (PRN):  fentaNYL    Injectable 25 MICROGram(s) IV Push every 4 hours PRN Moderate Pain (4 - 6)  nystatin Powder 1 Application(s) Topical three times a day PRN fungal infection you may see and want to treat    Vital Signs  T(C): 37.1 (01-25-25 @ 12:00), Max: 37.3 (01-24-25 @ 20:00)  T(F): 98.8 (01-25-25 @ 12:00), Max: 99.1 (01-24-25 @ 20:00)  HR: 87 (01-25-25 @ 13:00) (86 - 106)  BP: 108/62 (01-25-25 @ 13:00) (87/57 - 137/79)  RR: 24 (01-25-25 @ 13:00) (24 - 31)  SpO2: 100% (01-25-25 @ 13:00) (93% - 100%)    Physical Exam:  Gen:   HEENT:   Chest:   Cardiac:  Abd:   Ext:   Neuro:   Integument:     Labs:  CBC Full  -  ( 25 Jan 2025 01:18 )  WBC Count : 12.77 K/uL  RBC Count : 3.39 M/uL  Hemoglobin : 10.6 g/dL  Hematocrit : 33.1 %  Platelet Count - Automated : 162 K/uL  Mean Cell Volume : 97.6 fl  Mean Cell Hemoglobin : 31.3 pg  Mean Cell Hemoglobin Concentration : 32.0 g/dL  Auto Neutrophil # : 11.29 K/uL  Auto Lymphocyte # : 0.61 K/uL  Auto Monocyte # : 0.67 K/uL  Auto Eosinophil # : 0.05 K/uL  Auto Basophil # : 0.01 K/uL  Auto Neutrophil % : 88.4 %  Auto Lymphocyte % : 4.8 %  Auto Monocyte % : 5.2 %  Auto Eosinophil % : 0.4 %  Auto Basophil % : 0.1 %    01-25    139  |  97  |  81[H]  ----------------------------<  107[H]  3.7   |  18[L]  |  4.70[H]    Ca    8.9      25 Jan 2025 11:31  Phos  7.9     01-25  Mg     2.6     01-25    TPro  6.2  /  Alb  2.9[L]  /  TBili  0.5  /  DBili  x   /  AST  103[H]  /  ALT  105[H]  /  AlkPhos  141[H]  01-25    PT/INR - ( 25 Jan 2025 01:18 )   PT: 10.9 sec;   INR: 0.96 ratio         PTT - ( 25 Jan 2025 01:18 )  PTT:30.3 sec  Bilirubin Total: 0.5 mg/dL (01-25-25 @ 11:31)  Bilirubin Total: 0.6 mg/dL (01-25-25 @ 01:18)  Bilirubin Total: 0.6 mg/dL (01-24-25 @ 17:15)   [facility] CONSULT NOTE     HPI:  Mr. Gr is a 69 year old male with PMHx of seizure disorder, sleep apnea, HTN, chronic idiopathic constipation, stage III CKD, severe obesity, secondary hyperparathyroidism, anemia, thrombocytopenia, hyperlipidemia, testicular cancer under treatment with Dr. Ovalles who is admitted for fall and syncope. Hospital course complicated by grand mal seizures requiring intubation. MRI brain concerning for new metastatic lesions.     Former smoker, quit 14y prior, denied ETOH, drug use. Lives at home. Does not have children. Denies family history of blood disorders or malignancy.  Denies previous workplace related exposures.  Denies previous history of blood transfusions.  Denied history of thromboses.  Denied history of  requiring anticoagulation.     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  Margins were negative.  pT3Nx. CT C/A/P with few left para-aortic and left iliac chain lymph nodes largest measuring 8.1 cm left para-aortic node, bilateral subcentimeter noncalcified pulmonary nodules measuring up to 7 mm. AFP, LDH, hCG were all elevated. 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 Started on adjuvant therapy with Cisplatin+Etoposide, so far completed 4 cycles of treatment with cisplatin dose reduced 50% and Etoposide 50% due to thrombocytopenia.    ROS: pertinent positives and negatives as per HPI    Allergies: penicillin (Hives)  seasonal allergies (Unknown)    PMHx:  Hypertension, unspecified type  Other hyperlipidemia  History of epilepsy  Paranoid schizophrenia  Obstructive sleep apnea  Testicular cancer    SurgHx:   H/O Spinal surgery    SocHx:   Former smoker  Denies ETOH  Denied drug use  Lives at home    Meds:   MEDICATIONS  (STANDING):  albuterol/ipratropium for Nebulization 3 milliLiter(s) Nebulizer every 6 hours  atorvastatin 20 milliGRAM(s) Oral at bedtime  bacitracin   Ointment 1 Application(s) Topical two times a day  chlorhexidine 0.12% Liquid 15 milliLiter(s) Oral Mucosa every 12 hours  chlorhexidine 2% Cloths 1 Application(s) Topical <User Schedule>  chlorhexidine 2% Cloths 1 Application(s) Topical <User Schedule>  dexMEDEtomidine Infusion 0.5 MICROgram(s)/kG/Hr (12.9 mL/Hr) IV Continuous <Continuous>  escitalopram 15 milliGRAM(s) Oral daily  gabapentin Solution 300 milliGRAM(s) Oral two times a day  heparin   Injectable 7500 Unit(s) SubCutaneous every 12 hours  influenza  Vaccine (HIGH DOSE) 0.5 milliLiter(s) IntraMuscular once  lacosamide IVPB 100 milliGRAM(s) IV Intermittent every 12 hours  lamoTRIgine 100 milliGRAM(s) Oral two times a day  levETIRAcetam  IVPB 1500 milliGRAM(s) IV Intermittent two times a day  lidocaine   4% Patch 1 Patch Transdermal daily  lurasidone 40 milliGRAM(s) Oral daily  norepinephrine Infusion 0.05 MICROgram(s)/kG/Min (9.68 mL/Hr) IV Continuous <Continuous>  pantoprazole  Injectable 40 milliGRAM(s) IV Push daily  piperacillin/tazobactam IVPB.. 3.375 Gram(s) IV Intermittent every 12 hours  polyethylene glycol 3350 17 Gram(s) Oral daily  propofol Infusion 50 MICROgram(s)/kG/Min (31 mL/Hr) IV Continuous <Continuous>  senna Syrup 10 milliLiter(s) Oral at bedtime  sodium chloride 3%  Inhalation 4 milliLiter(s) Inhalation every 12 hours    MEDICATIONS  (PRN):  fentaNYL    Injectable 25 MICROGram(s) IV Push every 4 hours PRN Moderate Pain (4 - 6)  nystatin Powder 1 Application(s) Topical three times a day PRN fungal infection you may see and want to treat    Vital Signs  T(C): 37.1 (01-25-25 @ 12:00), Max: 37.3 (01-24-25 @ 20:00)  T(F): 98.8 (01-25-25 @ 12:00), Max: 99.1 (01-24-25 @ 20:00)  HR: 87 (01-25-25 @ 13:00) (86 - 106)  BP: 108/62 (01-25-25 @ 13:00) (87/57 - 137/79)  RR: 24 (01-25-25 @ 13:00) (24 - 31)  SpO2: 100% (01-25-25 @ 13:00) (93% - 100%)    Physical Exam:  Gen: Intubated, sedated  HEENT: intubated  Chest: equal chest rise  Cardiac: +S1 S2  Abd: non distended   Neuro: sedated    Labs:  CBC Full  -  ( 25 Jan 2025 01:18 )  WBC Count : 12.77 K/uL  RBC Count : 3.39 M/uL  Hemoglobin : 10.6 g/dL  Hematocrit : 33.1 %  Platelet Count - Automated : 162 K/uL  Mean Cell Volume : 97.6 fl  Mean Cell Hemoglobin : 31.3 pg  Mean Cell Hemoglobin Concentration : 32.0 g/dL  Auto Neutrophil # : 11.29 K/uL  Auto Lymphocyte # : 0.61 K/uL  Auto Monocyte # : 0.67 K/uL  Auto Eosinophil # : 0.05 K/uL  Auto Basophil # : 0.01 K/uL  Auto Neutrophil % : 88.4 %  Auto Lymphocyte % : 4.8 %  Auto Monocyte % : 5.2 %  Auto Eosinophil % : 0.4 %  Auto Basophil % : 0.1 %    01-25    139  |  97  |  81[H]  ----------------------------<  107[H]  3.7   |  18[L]  |  4.70[H]    Ca    8.9      25 Jan 2025 11:31  Phos  7.9     01-25  Mg     2.6     01-25    TPro  6.2  /  Alb  2.9[L]  /  TBili  0.5  /  DBili  x   /  AST  103[H]  /  ALT  105[H]  /  AlkPhos  141[H]  01-25    PT/INR - ( 25 Jan 2025 01:18 )   PT: 10.9 sec;   INR: 0.96 ratio         PTT - ( 25 Jan 2025 01:18 )  PTT:30.3 sec  Bilirubin Total: 0.5 mg/dL (01-25-25 @ 11:31)  Bilirubin Total: 0.6 mg/dL (01-25-25 @ 01:18)  Bilirubin Total: 0.6 mg/dL (01-24-25 @ 17:15)

## 2025-01-25 NOTE — CONSULT NOTE ADULT - SUBJECTIVE AND OBJECTIVE BOX
[facility] Initial Consult Note    HPI  69y old Male with history of Seizure disorder, VAZQUEZ, HTN, Stage III CKD, Thrombocytopenia, Testicular cancer admitted for fall and syncope complicated by grand mal seizures requiring intubation. MRI brain concerning for metastatic lesions.     History was obtained from chart review. Unable to obtain HPI due to patient's status. Thyroid US done which showed a 1.6 cm hypoechoic nodule in the RLL. No prior US noted. He has underlying testicular cancer and completed chemotherapy in 6/2024.     Vital Signs Last 24 Hrs  T(C): 37.5 (01-25-25 @ 20:00), Max: 37.5 (01-25-25 @ 20:00)  HR: 87 (01-25-25 @ 19:00) (84 - 106)  BP: 99/56 (01-25-25 @ 19:00) (87/57 - 137/79)  RR: 24 (01-25-25 @ 19:00) (24 - 31)  SpO2: 100% (01-25-25 @ 19:00) (93% - 100%)    Physical Exam  Intubated   Sedated  on propofol drip    Medications  albuterol/ipratropium for Nebulization 3 milliLiter(s) Nebulizer every 6 hours  atorvastatin 20 milliGRAM(s) Oral at bedtime  bacitracin   Ointment 1 Application(s) Topical two times a day  chlorhexidine 0.12% Liquid 15 milliLiter(s) Oral Mucosa every 12 hours  chlorhexidine 2% Cloths 1 Application(s) Topical <User Schedule>  chlorhexidine 2% Cloths 1 Application(s) Topical <User Schedule>  dexMEDEtomidine Infusion 0.5 MICROgram(s)/kG/Hr IV Continuous <Continuous>  escitalopram 15 milliGRAM(s) Oral daily  fentaNYL    Injectable 25 MICROGram(s) IV Push every 4 hours PRN  gabapentin Solution 300 milliGRAM(s) Oral two times a day  heparin   Injectable 7500 Unit(s) SubCutaneous every 12 hours  influenza  Vaccine (HIGH DOSE) 0.5 milliLiter(s) IntraMuscular once  lacosamide IVPB 100 milliGRAM(s) IV Intermittent every 12 hours  lamoTRIgine 100 milliGRAM(s) Oral two times a day  levETIRAcetam  IVPB 1500 milliGRAM(s) IV Intermittent two times a day  lidocaine   4% Patch 1 Patch Transdermal daily  lurasidone 40 milliGRAM(s) Oral daily  norepinephrine Infusion 0.05 MICROgram(s)/kG/Min IV Continuous <Continuous>  nystatin Powder 1 Application(s) Topical three times a day PRN  pantoprazole  Injectable 40 milliGRAM(s) IV Push daily  piperacillin/tazobactam IVPB.. 3.375 Gram(s) IV Intermittent every 12 hours  polyethylene glycol 3350 17 Gram(s) Oral daily  propofol Infusion 50 MICROgram(s)/kG/Min IV Continuous <Continuous>  senna Syrup 10 milliLiter(s) Oral at bedtime  sodium chloride 3%  Inhalation 4 milliLiter(s) Inhalation every 12 hours    Pertinent labs/imaging    eGFR: 13 mL/min/1.73m2 (01-25-25 @ 17:46)  eGFR: 13 mL/min/1.73m2 (01-25-25 @ 11:31)  eGFR: 14 mL/min/1.73m2 (01-25-25 @ 01:18)  eGFR: 16 mL/min/1.73m2 (01-24-25 @ 17:15)  eGFR: 18 mL/min/1.73m2 (01-24-25 @ 10:29)  eGFR: 23 mL/min/1.73m2 (01-24-25 @ 00:03)

## 2025-01-25 NOTE — PROGRESS NOTE ADULT - SUBJECTIVE AND OBJECTIVE BOX
[facility]   [phone]  RENAL FOLLOW UP NOTE  --------------------------------------------------------------------------------  Chief Complaint:    24 hour events/subjective:    seen earlier   intubated     PAST HISTORY  --------------------------------------------------------------------------------  No significant changes to PMH, PSH, FHx, SHx, unless otherwise noted    ALLERGIES & MEDICATIONS  --------------------------------------------------------------------------------  Allergies    penicillin (Hives)  seasonal allergies (Unknown)    Intolerances    latex (Rash)    Standing Inpatient Medications  albuterol/ipratropium for Nebulization 3 milliLiter(s) Nebulizer every 6 hours  atorvastatin 20 milliGRAM(s) Oral at bedtime  bacitracin   Ointment 1 Application(s) Topical two times a day  chlorhexidine 0.12% Liquid 15 milliLiter(s) Oral Mucosa every 12 hours  chlorhexidine 2% Cloths 1 Application(s) Topical <User Schedule>  chlorhexidine 2% Cloths 1 Application(s) Topical <User Schedule>  dexMEDEtomidine Infusion 0.5 MICROgram(s)/kG/Hr IV Continuous <Continuous>  escitalopram 15 milliGRAM(s) Oral daily  gabapentin Solution 300 milliGRAM(s) Oral two times a day  heparin   Injectable 7500 Unit(s) SubCutaneous every 12 hours  influenza  Vaccine (HIGH DOSE) 0.5 milliLiter(s) IntraMuscular once  lacosamide IVPB 100 milliGRAM(s) IV Intermittent every 12 hours  lamoTRIgine 100 milliGRAM(s) Oral two times a day  levETIRAcetam  IVPB 1500 milliGRAM(s) IV Intermittent two times a day  lidocaine   4% Patch 1 Patch Transdermal daily  lurasidone 40 milliGRAM(s) Oral daily  norepinephrine Infusion 0.05 MICROgram(s)/kG/Min IV Continuous <Continuous>  pantoprazole  Injectable 40 milliGRAM(s) IV Push daily  piperacillin/tazobactam IVPB.. 3.375 Gram(s) IV Intermittent every 12 hours  polyethylene glycol 3350 17 Gram(s) Oral daily  propofol Infusion 50 MICROgram(s)/kG/Min IV Continuous <Continuous>  senna Syrup 10 milliLiter(s) Oral at bedtime  sodium chloride 3%  Inhalation 4 milliLiter(s) Inhalation every 12 hours    PRN Inpatient Medications  fentaNYL    Injectable 25 MICROGram(s) IV Push every 4 hours PRN  nystatin Powder 1 Application(s) Topical three times a day PRN      REVIEW OF SYSTEMS  --------------------------------------------------------------------------------        VITALS/PHYSICAL EXAM  --------------------------------------------------------------------------------  T(C): 37.4 (01-25-25 @ 16:00), Max: 37.4 (01-25-25 @ 16:00)  HR: 90 (01-25-25 @ 18:00) (84 - 106)  BP: 112/65 (01-25-25 @ 18:00) (87/57 - 137/79)  RR: 24 (01-25-25 @ 18:00) (24 - 31)  SpO2: 100% (01-25-25 @ 18:00) (93% - 100%)  Wt(kg): --        01-24-25 @ 07:01  -  01-25-25 @ 07:00  --------------------------------------------------------  IN: 1812.1 mL / OUT: 2805 mL / NET: -992.9 mL    01-25-25 @ 07:01  -  01-25-25 @ 19:50  --------------------------------------------------------  IN: 587.2 mL / OUT: 1350 mL / NET: -762.8 mL      Physical Exam:  	      Gen: intubated  obese   	no jvd  	Pulm: decrease bs  no rales or ronchi or wheezing  	CV: tachy  S1S2; no rub  	Abd: hypoactive bs soft distended  	UE: Warm, no cyanosis  no clubbing,  no edema; no asterixis  	LE: Warm, no cyanosis  no clubbing, no edema  	Neuro:  intubated       LABS/STUDIES  --------------------------------------------------------------------------------              10.6   12.77 >-----------<  162      [01-25-25 @ 01:18]              33.1     139  |  99  |  82  ----------------------------<  110      [01-25-25 @ 17:46]  3.5   |  18  |  4.76        Ca     9.3     [01-25-25 @ 17:46]      Mg     2.6     [01-25-25 @ 17:46]      Phos  8.1     [01-25-25 @ 17:46]    TPro  6.5  /  Alb  3.2  /  TBili  0.4  /  DBili  x   /  AST  91  /  ALT  102  /  AlkPhos  143  [01-25-25 @ 17:46]    PT/INR: PT 10.9 , INR 0.96       [01-25-25 @ 01:18]  PTT: 30.3       [01-25-25 @ 01:18]      Creatinine Trend:  SCr 4.76 [01-25 @ 17:46]  SCr 4.70 [01-25 @ 11:31]  SCr 4.31 [01-25 @ 01:18]  SCr 3.92 [01-24 @ 17:15]  SCr 3.61 [01-24 @ 10:29]

## 2025-01-25 NOTE — PROGRESS NOTE ADULT - ASSESSMENT
70 y/o M with PMH of metastatic testicular cancer (s/p L orchiectomy, on etoposide/cisplatin chemo, last dose 6/2024), epilepsy (grand-mal seizures), schizophrenia, developmental delay, HTN, HLD, VAZQUEZ, stage III CKD, severe obesity, secondary hyperparathyroidism, anemia, thrombocytopenia who presented to ED for fall, possible seizure prior. On 1/17 patient had multiple RRTs called for grand mal seizure activity and  patient had x2 more episode and was ultimately given ativan 2mg, Vimpat load 200mg, and intubated for airway protection with MICU transfer. Extubated 1/19 - to AVAPS, post extubation with increased secretion. Downgraded to medical floor 1/21. Pt still with fevers, s/p RRT 1/23 AM for fever, hypoxia.  AVAPS - O2 sats 98% but tachypneic to 40, tachycardic, pt endorsing SOB. RRT ---> intubated for respiratory failure. also febrile and Hypertensive. CKD      1- CKD III  with SHAGUFTA   2- CHF   3- fevers  4- tachycardia  5- respiratory failure  6- HTN     cr worsening  suspect ischemic ATN in setting of infection and hypotension   fluid status improved based on pocus by the primary team   dc bumex   renal function still cont to deteriorate uo remains adequate and lytes acceptable   zosyn 3,375 g iv  cont   broad spectrum antibiotic   vent support   d/w icu team

## 2025-01-26 LAB
ADD ON TEST-SPECIMEN IN LAB: SIGNIFICANT CHANGE UP
AFP-TM SERPL-MCNC: <1.8 NG/ML — SIGNIFICANT CHANGE UP
ALBUMIN SERPL ELPH-MCNC: 3.2 G/DL — LOW (ref 3.3–5)
ALBUMIN SERPL ELPH-MCNC: 3.2 G/DL — LOW (ref 3.3–5)
ALP SERPL-CCNC: 154 U/L — HIGH (ref 40–120)
ALP SERPL-CCNC: 157 U/L — HIGH (ref 40–120)
ALT FLD-CCNC: 103 U/L — HIGH (ref 10–45)
ALT FLD-CCNC: 96 U/L — HIGH (ref 10–45)
ANION GAP SERPL CALC-SCNC: 22 MMOL/L — HIGH (ref 5–17)
ANION GAP SERPL CALC-SCNC: 23 MMOL/L — HIGH (ref 5–17)
APTT BLD: 28.4 SEC — SIGNIFICANT CHANGE UP (ref 24.5–35.6)
AST SERPL-CCNC: 83 U/L — HIGH (ref 10–40)
AST SERPL-CCNC: 99 U/L — HIGH (ref 10–40)
BASOPHILS # BLD AUTO: 0.02 K/UL — SIGNIFICANT CHANGE UP (ref 0–0.2)
BASOPHILS NFR BLD AUTO: 0.2 % — SIGNIFICANT CHANGE UP (ref 0–2)
BILIRUB SERPL-MCNC: 0.5 MG/DL — SIGNIFICANT CHANGE UP (ref 0.2–1.2)
BILIRUB SERPL-MCNC: 0.6 MG/DL — SIGNIFICANT CHANGE UP (ref 0.2–1.2)
BUN SERPL-MCNC: 91 MG/DL — HIGH (ref 7–23)
BUN SERPL-MCNC: 97 MG/DL — HIGH (ref 7–23)
CALCIUM SERPL-MCNC: 9.2 MG/DL — SIGNIFICANT CHANGE UP (ref 8.4–10.5)
CALCIUM SERPL-MCNC: 9.5 MG/DL — SIGNIFICANT CHANGE UP (ref 8.4–10.5)
CHLORIDE SERPL-SCNC: 100 MMOL/L — SIGNIFICANT CHANGE UP (ref 96–108)
CHLORIDE SERPL-SCNC: 99 MMOL/L — SIGNIFICANT CHANGE UP (ref 96–108)
CO2 SERPL-SCNC: 19 MMOL/L — LOW (ref 22–31)
CO2 SERPL-SCNC: 20 MMOL/L — LOW (ref 22–31)
CREAT SERPL-MCNC: 4.66 MG/DL — HIGH (ref 0.5–1.3)
CREAT SERPL-MCNC: 4.7 MG/DL — HIGH (ref 0.5–1.3)
CULTURE RESULTS: NO GROWTH — SIGNIFICANT CHANGE UP
EGFR: 13 ML/MIN/1.73M2 — LOW
EGFR: 13 ML/MIN/1.73M2 — LOW
EOSINOPHIL # BLD AUTO: 0.08 K/UL — SIGNIFICANT CHANGE UP (ref 0–0.5)
EOSINOPHIL NFR BLD AUTO: 0.9 % — SIGNIFICANT CHANGE UP (ref 0–6)
GAS PNL BLDA: SIGNIFICANT CHANGE UP
GLUCOSE BLDC GLUCOMTR-MCNC: 129 MG/DL — HIGH (ref 70–99)
GLUCOSE BLDC GLUCOMTR-MCNC: 141 MG/DL — HIGH (ref 70–99)
GLUCOSE SERPL-MCNC: 126 MG/DL — HIGH (ref 70–99)
GLUCOSE SERPL-MCNC: 130 MG/DL — HIGH (ref 70–99)
GRAM STN FLD: ABNORMAL
HCG-TM SERPL-ACNC: 1 MIU/ML — SIGNIFICANT CHANGE UP
HCT VFR BLD CALC: 26.6 % — LOW (ref 39–50)
HGB BLD-MCNC: 8.8 G/DL — LOW (ref 13–17)
IMM GRANULOCYTES NFR BLD AUTO: 1.4 % — HIGH (ref 0–0.9)
INR BLD: 0.95 RATIO — SIGNIFICANT CHANGE UP (ref 0.85–1.16)
LDH SERPL L TO P-CCNC: 331 U/L — HIGH (ref 50–242)
LYMPHOCYTES # BLD AUTO: 0.65 K/UL — LOW (ref 1–3.3)
LYMPHOCYTES # BLD AUTO: 7.2 % — LOW (ref 13–44)
MAGNESIUM SERPL-MCNC: 2.6 MG/DL — SIGNIFICANT CHANGE UP (ref 1.6–2.6)
MAGNESIUM SERPL-MCNC: 2.9 MG/DL — HIGH (ref 1.6–2.6)
MCHC RBC-ENTMCNC: 31.9 PG — SIGNIFICANT CHANGE UP (ref 27–34)
MCHC RBC-ENTMCNC: 33.1 G/DL — SIGNIFICANT CHANGE UP (ref 32–36)
MCV RBC AUTO: 96.4 FL — SIGNIFICANT CHANGE UP (ref 80–100)
MONOCYTES # BLD AUTO: 0.49 K/UL — SIGNIFICANT CHANGE UP (ref 0–0.9)
MONOCYTES NFR BLD AUTO: 5.4 % — SIGNIFICANT CHANGE UP (ref 2–14)
NEUTROPHILS # BLD AUTO: 7.67 K/UL — HIGH (ref 1.8–7.4)
NEUTROPHILS NFR BLD AUTO: 84.9 % — HIGH (ref 43–77)
NRBC # BLD: 0 /100 WBCS — SIGNIFICANT CHANGE UP (ref 0–0)
NRBC BLD-RTO: 0 /100 WBCS — SIGNIFICANT CHANGE UP (ref 0–0)
PHOSPHATE SERPL-MCNC: 8.4 MG/DL — HIGH (ref 2.5–4.5)
PHOSPHATE SERPL-MCNC: 9.3 MG/DL — HIGH (ref 2.5–4.5)
PLATELET # BLD AUTO: 166 K/UL — SIGNIFICANT CHANGE UP (ref 150–400)
POTASSIUM SERPL-MCNC: 3.6 MMOL/L — SIGNIFICANT CHANGE UP (ref 3.5–5.3)
POTASSIUM SERPL-MCNC: 3.8 MMOL/L — SIGNIFICANT CHANGE UP (ref 3.5–5.3)
POTASSIUM SERPL-SCNC: 3.6 MMOL/L — SIGNIFICANT CHANGE UP (ref 3.5–5.3)
POTASSIUM SERPL-SCNC: 3.8 MMOL/L — SIGNIFICANT CHANGE UP (ref 3.5–5.3)
PROT SERPL-MCNC: 6.7 G/DL — SIGNIFICANT CHANGE UP (ref 6–8.3)
PROT SERPL-MCNC: 7 G/DL — SIGNIFICANT CHANGE UP (ref 6–8.3)
PROTHROM AB SERPL-ACNC: 10.9 SEC — SIGNIFICANT CHANGE UP (ref 9.9–13.4)
RBC # BLD: 2.76 M/UL — LOW (ref 4.2–5.8)
RBC # FLD: 13 % — SIGNIFICANT CHANGE UP (ref 10.3–14.5)
SODIUM SERPL-SCNC: 141 MMOL/L — SIGNIFICANT CHANGE UP (ref 135–145)
SODIUM SERPL-SCNC: 142 MMOL/L — SIGNIFICANT CHANGE UP (ref 135–145)
SPECIMEN SOURCE: SIGNIFICANT CHANGE UP
URATE SERPL-MCNC: 9.2 MG/DL — HIGH (ref 3.4–8.8)
WBC # BLD: 9.04 K/UL — SIGNIFICANT CHANGE UP (ref 3.8–10.5)
WBC # FLD AUTO: 9.04 K/UL — SIGNIFICANT CHANGE UP (ref 3.8–10.5)

## 2025-01-26 PROCEDURE — 72131 CT LUMBAR SPINE W/O DYE: CPT | Mod: 26

## 2025-01-26 PROCEDURE — 99291 CRITICAL CARE FIRST HOUR: CPT

## 2025-01-26 PROCEDURE — 72128 CT CHEST SPINE W/O DYE: CPT | Mod: 26

## 2025-01-26 RX ORDER — PROPOFOL 10 MG/ML
25 INJECTION, EMULSION INTRAVENOUS
Qty: 1000 | Refills: 0 | Status: DISCONTINUED | OUTPATIENT
Start: 2025-01-26 | End: 2025-01-28

## 2025-01-26 RX ORDER — RASBURICASE
3 KIT TOPICAL ONCE
Refills: 0 | Status: COMPLETED | OUTPATIENT
Start: 2025-01-26 | End: 2025-01-26

## 2025-01-26 RX ORDER — ALLOPURINOL 300 MG
50 TABLET ORAL
Refills: 0 | Status: DISCONTINUED | OUTPATIENT
Start: 2025-01-26 | End: 2025-01-26

## 2025-01-26 RX ORDER — POTASSIUM CHLORIDE 750 MG/1
20 TABLET, EXTENDED RELEASE ORAL ONCE
Refills: 0 | Status: COMPLETED | OUTPATIENT
Start: 2025-01-26 | End: 2025-01-26

## 2025-01-26 RX ORDER — SEVELAMER CARBONATE 800 MG/1
800 TABLET, FILM COATED ORAL EVERY 8 HOURS
Refills: 0 | Status: DISCONTINUED | OUTPATIENT
Start: 2025-01-26 | End: 2025-01-29

## 2025-01-26 RX ORDER — SEVELAMER CARBONATE 800 MG/1
800 TABLET, FILM COATED ORAL EVERY 8 HOURS
Refills: 0 | Status: DISCONTINUED | OUTPATIENT
Start: 2025-01-26 | End: 2025-01-26

## 2025-01-26 RX ADMIN — POTASSIUM CHLORIDE 20 MILLIEQUIVALENT(S): 750 TABLET, EXTENDED RELEASE ORAL at 01:30

## 2025-01-26 RX ADMIN — LACOSAMIDE 120 MILLIGRAM(S): 200 TABLET, FILM COATED ORAL at 05:03

## 2025-01-26 RX ADMIN — ANTISEPTIC SURGICAL SCRUB 1 APPLICATION(S): 0.04 SOLUTION TOPICAL at 05:04

## 2025-01-26 RX ADMIN — IPRATROPIUM BROMIDE AND ALBUTEROL SULFATE 3 MILLILITER(S): .5; 2.5 SOLUTION RESPIRATORY (INHALATION) at 18:01

## 2025-01-26 RX ADMIN — LURASIDONE HYDROCHLORIDE 40 MILLIGRAM(S): 80 TABLET, FILM COATED ORAL at 11:53

## 2025-01-26 RX ADMIN — Medication 7500 UNIT(S): at 05:04

## 2025-01-26 RX ADMIN — Medication 1 APPLICATION(S): at 17:11

## 2025-01-26 RX ADMIN — IPRATROPIUM BROMIDE AND ALBUTEROL SULFATE 3 MILLILITER(S): .5; 2.5 SOLUTION RESPIRATORY (INHALATION) at 11:29

## 2025-01-26 RX ADMIN — LAMOTRIGINE 100 MILLIGRAM(S): 100 TABLET ORAL at 05:03

## 2025-01-26 RX ADMIN — ANTISEPTIC SURGICAL SCRUB 1 APPLICATION(S): 0.04 SOLUTION TOPICAL at 05:19

## 2025-01-26 RX ADMIN — IPRATROPIUM BROMIDE AND ALBUTEROL SULFATE 3 MILLILITER(S): .5; 2.5 SOLUTION RESPIRATORY (INHALATION) at 23:06

## 2025-01-26 RX ADMIN — LEVETIRACETAM 400 MILLIGRAM(S): 750 TABLET, FILM COATED ORAL at 17:10

## 2025-01-26 RX ADMIN — PROPOFOL 15.5 MICROGRAM(S)/KG/MIN: 10 INJECTION, EMULSION INTRAVENOUS at 05:02

## 2025-01-26 RX ADMIN — ESCITALOPRAM 15 MILLIGRAM(S): 10 TABLET, FILM COATED ORAL at 11:52

## 2025-01-26 RX ADMIN — SEVELAMER CARBONATE 800 MILLIGRAM(S): 800 TABLET, FILM COATED ORAL at 22:05

## 2025-01-26 RX ADMIN — SEVELAMER CARBONATE 800 MILLIGRAM(S): 800 TABLET, FILM COATED ORAL at 14:59

## 2025-01-26 RX ADMIN — ATORVASTATIN CALCIUM 20 MILLIGRAM(S): 80 TABLET, FILM COATED ORAL at 22:26

## 2025-01-26 RX ADMIN — PROPOFOL 15.5 MICROGRAM(S)/KG/MIN: 10 INJECTION, EMULSION INTRAVENOUS at 11:53

## 2025-01-26 RX ADMIN — RASBURICASE 104 MILLIGRAM(S): KIT at 11:52

## 2025-01-26 RX ADMIN — LIDOCAINE HYDROCHLORIDE 1 PATCH: 30 CREAM TOPICAL at 04:01

## 2025-01-26 RX ADMIN — Medication 7500 UNIT(S): at 17:10

## 2025-01-26 RX ADMIN — IPRATROPIUM BROMIDE AND ALBUTEROL SULFATE 3 MILLILITER(S): .5; 2.5 SOLUTION RESPIRATORY (INHALATION) at 05:12

## 2025-01-26 RX ADMIN — PIPERACILLIN SODIUM AND TAZOBACTAM SODIUM 25 GRAM(S): 2; 250 INJECTION, POWDER, FOR SOLUTION INTRAVENOUS at 05:02

## 2025-01-26 RX ADMIN — GABAPENTIN 300 MILLIGRAM(S): 800 TABLET ORAL at 05:03

## 2025-01-26 RX ADMIN — LIDOCAINE HYDROCHLORIDE 1 PATCH: 30 CREAM TOPICAL at 07:49

## 2025-01-26 RX ADMIN — LACOSAMIDE 120 MILLIGRAM(S): 200 TABLET, FILM COATED ORAL at 18:17

## 2025-01-26 RX ADMIN — LIDOCAINE HYDROCHLORIDE 1 PATCH: 30 CREAM TOPICAL at 20:07

## 2025-01-26 RX ADMIN — PANTOPRAZOLE 40 MILLIGRAM(S): 20 TABLET, DELAYED RELEASE ORAL at 11:53

## 2025-01-26 RX ADMIN — Medication 10 MILLILITER(S): at 22:05

## 2025-01-26 RX ADMIN — Medication 4 MILLILITER(S): at 18:01

## 2025-01-26 RX ADMIN — Medication 1 APPLICATION(S): at 05:04

## 2025-01-26 RX ADMIN — LAMOTRIGINE 100 MILLIGRAM(S): 100 TABLET ORAL at 17:11

## 2025-01-26 RX ADMIN — Medication 4 MILLILITER(S): at 05:12

## 2025-01-26 RX ADMIN — PROPOFOL 15.5 MICROGRAM(S)/KG/MIN: 10 INJECTION, EMULSION INTRAVENOUS at 22:05

## 2025-01-26 RX ADMIN — ANTISEPTIC SURGICAL SCRUB 15 MILLILITER(S): 0.04 SOLUTION TOPICAL at 05:03

## 2025-01-26 RX ADMIN — LEVETIRACETAM 400 MILLIGRAM(S): 750 TABLET, FILM COATED ORAL at 05:03

## 2025-01-26 RX ADMIN — PIPERACILLIN SODIUM AND TAZOBACTAM SODIUM 25 GRAM(S): 2; 250 INJECTION, POWDER, FOR SOLUTION INTRAVENOUS at 17:10

## 2025-01-26 RX ADMIN — POLYETHYLENE GLYCOL 3350 17 GRAM(S): 17 POWDER, FOR SOLUTION ORAL at 11:53

## 2025-01-26 RX ADMIN — GABAPENTIN 300 MILLIGRAM(S): 800 TABLET ORAL at 17:10

## 2025-01-26 RX ADMIN — ANTISEPTIC SURGICAL SCRUB 15 MILLILITER(S): 0.04 SOLUTION TOPICAL at 17:11

## 2025-01-26 NOTE — PROGRESS NOTE ADULT - SUBJECTIVE AND OBJECTIVE BOX
[facility]   [phone]  RENAL FOLLOW UP NOTE  --------------------------------------------------------------------------------  Chief Complaint:    24 hour events/subjective:    seen earlier   intubated     PAST HISTORY  --------------------------------------------------------------------------------  No significant changes to PMH, PSH, FHx, SHx, unless otherwise noted    ALLERGIES & MEDICATIONS  --------------------------------------------------------------------------------  Allergies    penicillin (Hives)  seasonal allergies (Unknown)    Intolerances    latex (Rash)    Standing Inpatient Medications  albuterol/ipratropium for Nebulization 3 milliLiter(s) Nebulizer every 6 hours  atorvastatin 20 milliGRAM(s) Oral at bedtime  bacitracin   Ointment 1 Application(s) Topical two times a day  chlorhexidine 0.12% Liquid 15 milliLiter(s) Oral Mucosa every 12 hours  chlorhexidine 2% Cloths 1 Application(s) Topical <User Schedule>  chlorhexidine 2% Cloths 1 Application(s) Topical <User Schedule>  escitalopram 15 milliGRAM(s) Oral daily  gabapentin Solution 300 milliGRAM(s) Oral two times a day  heparin   Injectable 7500 Unit(s) SubCutaneous every 12 hours  influenza  Vaccine (HIGH DOSE) 0.5 milliLiter(s) IntraMuscular once  lacosamide IVPB 100 milliGRAM(s) IV Intermittent every 12 hours  lamoTRIgine 100 milliGRAM(s) Oral two times a day  levETIRAcetam  IVPB 1500 milliGRAM(s) IV Intermittent two times a day  lidocaine   4% Patch 1 Patch Transdermal daily  lurasidone 40 milliGRAM(s) Oral daily  pantoprazole  Injectable 40 milliGRAM(s) IV Push daily  piperacillin/tazobactam IVPB.. 3.375 Gram(s) IV Intermittent every 12 hours  polyethylene glycol 3350 17 Gram(s) Oral daily  propofol Infusion 25 MICROgram(s)/kG/Min IV Continuous <Continuous>  senna Syrup 10 milliLiter(s) Oral at bedtime  sevelamer carbonate Powder 800 milliGRAM(s) Oral every 8 hours  sodium chloride 3%  Inhalation 4 milliLiter(s) Inhalation every 12 hours    PRN Inpatient Medications  fentaNYL    Injectable 25 MICROGram(s) IV Push every 4 hours PRN  nystatin Powder 1 Application(s) Topical three times a day PRN      REVIEW OF SYSTEMS  --------------------------------------------------------------------------------        VITALS/PHYSICAL EXAM  --------------------------------------------------------------------------------  T(C): 37 (01-26-25 @ 08:00), Max: 37.5 (01-25-25 @ 20:00)  HR: 88 (01-26-25 @ 18:02) (80 - 92)  BP: 137/72 (01-26-25 @ 09:00) (99/56 - 154/80)  RR: 25 (01-26-25 @ 09:00) (22 - 27)  SpO2: 100% (01-26-25 @ 18:02) (98% - 100%)  Wt(kg): --        01-25-25 @ 07:01  -  01-26-25 @ 07:00  --------------------------------------------------------  IN: 1343.4 mL / OUT: 2695 mL / NET: -1351.6 mL    01-26-25 @ 07:01  -  01-26-25 @ 18:57  --------------------------------------------------------  IN: 18.6 mL / OUT: 350 mL / NET: -331.4 mL      Physical Exam:  	      Gen: intubated  obese   	no jvd  	Pulm: decrease bs  no rales or ronchi or wheezing  	CV: tachy  S1S2; no rub  	Abd: hypoactive bs soft distended  	UE: Warm, no cyanosis  no clubbing,  no edema;   	LE: Warm, no cyanosis  no clubbing, no edema  	Neuro:  intubated     LABS/STUDIES  --------------------------------------------------------------------------------              8.8    9.04  >-----------<  166      [01-26-25 @ 00:16]              26.6     142  |  100  |  97  ----------------------------<  130      [01-26-25 @ 12:10]  3.8   |  19  |  4.66        Ca     9.5     [01-26-25 @ 12:10]      Mg     2.9     [01-26-25 @ 12:10]      Phos  9.3     [01-26-25 @ 12:10]    TPro  7.0  /  Alb  3.2  /  TBili  0.6  /  DBili  x   /  AST  83  /  ALT  96  /  AlkPhos  154  [01-26-25 @ 12:10]    PT/INR: PT 10.9 , INR 0.95       [01-26-25 @ 00:17]  PTT: 28.4       [01-26-25 @ 00:17]    Uric acid 9.2      [01-26-25 @ 00:17]        [01-26-25 @ 00:17]    Creatinine Trend:  SCr 4.66 [01-26 @ 12:10]  SCr 4.70 [01-26 @ 00:17]  SCr 4.76 [01-25 @ 17:46]  SCr 4.70 [01-25 @ 11:31]  SCr 4.31 [01-25 @ 01:18]          TSH 0.87      [01-25-25 @ 11:31]

## 2025-01-26 NOTE — PROGRESS NOTE ADULT - ASSESSMENT
68 y/o M with PMH of metastatic testicular cancer (s/p L orchiectomy, on etoposide/cisplatin chemo, last dose 6/2024), epilepsy (grand-mal seizures), schizophrenia, developmental delay, HTN, HLD, VAZQUEZ, stage III CKD, severe obesity, secondary hyperparathyroidism, anemia, thrombocytopenia who presented to ED for fall, possible seizure prior. On 1/17 patient had multiple RRTs called for grand mal seizure activity and  patient had x2 more episode and was ultimately given ativan 2mg, Vimpat load 200mg, and intubated for airway protection with MICU transfer. Extubated 1/19 - to AVAPS, post extubation with increased secretion. Downgraded to medical floor 1/21. Pt still with fevers, s/p RRT 1/23 AM for fever, hypoxia.  AVAPS - O2 sats 98% but tachypneic to 40, tachycardic, pt endorsing SOB. RRT ---> intubated for respiratory failure. also febrile and Hypertensive. CKD      1- CKD III  with SHAGUFTA   2- CHF   3- fevers  4- tachycardia  5- respiratory failure  6- HTN     cr worsening  suspect ischemic ATN in setting of infection and hypotension   fluid status improved based on pocus by the primary team   off diuretics now   cr still quite elevated.   remains non oliguric with good uo suspect diuretic phase of ATN   trend cr and lytes   zosyn 3,375 g iv  cont   broad spectrum antibiotic   vent support   d/w icu team when seen earlier

## 2025-01-26 NOTE — PROGRESS NOTE ADULT - ASSESSMENT
Mr. Gr is a 69 year old male with PMHx of seizure disorder, sleep apnea, HTN, chronic idiopathic constipation, stage III CKD, severe obesity, secondary hyperparathyroidism, anemia, thrombocytopenia, hyperlipidemia, testicular cancer s/p EP x 4 under the care of Dr. Ovalles who is admitted s/p fall in setting of possible seizure since 01/16/2025. He was intubated early in his admission due to seziures and extubated 1/21/2025 and then again intubated in setting of respiratory distress, requiring diuresis, with SHAGUFTA on CKD, in the MICU. Imaging shows possible brain metastatic disease and lung mass as well as abnormal thyroid nodule.     Former smoker, quit 14y prior, denied ETOH, drug use. Lives at home. Does not have children. Denied family history of blood disorders or malignancy.  Denied previous workplace related exposures.  Denied previous history of blood transfusions.  Denied history of thromboses.  Denied history of  requiring anticoagulation.        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  Margins were negative.  pT3Nx. CT C/A/P with few left para-aortic and left iliac chain lymph nodes largest measuring 8.1 cm left para-aortic node, bilateral subcentimeter noncalcified pulmonary nodules measuring up to 7 mm. AFP, LDH, hCG were all elevated. 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 Completed four cycles of adjuvant therapy with Cisplatin+Etoposide, with cisplatin dose reduced 50% and Etoposide 50% due to thrombocytopenia at that time. Subsequent scans 08/2024 showed resolution of disease and negative markers,        # Brain lesion  # Seizures  -Seizures noted, pt with hx of seizures as well  - Neurology following  - MRI brain now with 5.4 mm enhancing lesion in the LEFT middle cerebellar peduncle with associated edema suspicious for metastases  - MRI Lumbar negative for acute disease  - Small lesion without mass effect or edema, recommend to correlate per neurology if foci and locus are concordant with seizure activity  - It is rare, but nonetheless not impossible, for testicular cancer to be metastatic to the brain  - He will need another PET-CT, can be completed outpatient once stable  - Endocrinology eval for thyroid nodule  - Obtain repeat markers including LDH and uric acid, AFP, BHCG quant        # Thyroid nodule  - US Thyroid 01/24/2025 with 1.6cm complex mixed solid cystic hypoechoic nodule in the lower pole of the right thyroid lobe, TIRADS 5.  Further evaluation of this nodule with fine-needle aspiration biopsy under ultrasound guidance to target the solid components is advised  - Obtain TSH, T4  -Endocrinology eval, Dr. 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 Ovalles 12/03/2024, markers continued to be negative  - See above in regards to brain lesion  - Follow up as outpatient with Franki Ovalles MD     # Thrombocytopenia  - Labile  - Coags wnl, LFTs elevated  - Continue to trend  - Transfuse to maintain >10k, if actively bleeding then maintain >50k     # Acute kidney injury on CKD Stage IIIA  - Worsening    - Likely multifactorial at this point  - Nephrology consult and recs noted  - Continue to trend     # Leukocytosis, Neutrophilia  -Likely reactive  -Continue to trend     # Normocytic anemia  -  Chronic, has hx of PRBC during chemo 07/2024  - Noted Anti E, Anti-K Anti-C antibodies previously  - Monitor for bleeding  - Recommend to transfuse to maintain Hb > 7        Thank you for allowing me to participate in the care Mr. Gr, please do not hesitate to call or text me if you have further questions or concerns.        Brayan Mart MD  [facility]   Division of Hematology/Oncology  [address] [place] [address], [address]  [place] [address]  P: [phone]  F: [phone]    Attestation:   Total time spent on the encounter: >60 minutes   ----Including face-to-face interaction in addition to chart review, reviewing treatment plan, and managing the patient’s chronic diagnoses as listed in the assessment----     1.	I have reviewed, analyzed and interpreted the following labs: CBC, CMP, imaging:  MRI Brain, L spine, CT Chest w/o,  impressions as above.   2.	I have reviewed notes stating pts current admission, consultants and follow ups.   3.	I have reviewed the past medical, family, and surgical history and confirmed with outpatient records  Mr. Gr is a 69 year old male with PMHx of seizure disorder, sleep apnea, HTN, chronic idiopathic constipation, stage III CKD, severe obesity, secondary hyperparathyroidism, anemia, thrombocytopenia, hyperlipidemia, testicular cancer s/p EP x 4 under the care of Dr. Ovalles who is admitted s/p fall in setting of possible seizure since 01/16/2025. He was intubated early in his admission due to seziures and extubated 1/21/2025 and then again intubated in setting of respiratory distress, requiring diuresis, with SHAGUFTA on CKD, in the MICU. Imaging shows possible brain metastatic disease and lung mass as well as abnormal thyroid nodule.     Former smoker, quit 14y prior, denied ETOH, drug use. Lives at home. Does not have children. Denied family history of blood disorders or malignancy.  Denied previous workplace related exposures.  Denied previous history of blood transfusions.  Denied history of thromboses.  Denied history of  requiring anticoagulation.        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  Margins were negative.  pT3Nx. CT C/A/P with few left para-aortic and left iliac chain lymph nodes largest measuring 8.1 cm left para-aortic node, bilateral subcentimeter noncalcified pulmonary nodules measuring up to 7 mm. AFP, LDH, hCG were all elevated. 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 Completed four cycles of adjuvant therapy with Cisplatin+Etoposide, with cisplatin dose reduced 50% and Etoposide 50% due to thrombocytopenia at that time. Subsequent scans 08/2024 showed resolution of disease and negative markers,         01/26/2025: continues intubated and sedated in the ICU, discussed with ICU team yesterday, pending markers, endocrinology eval noted        # Brain lesion  # Seizures  -Seizures noted, pt with hx of seizures as well  - Neurology following  - MRI brain now with 5.4 mm enhancing lesion in the LEFT middle cerebellar peduncle with associated edema suspicious for metastases  - MRI Lumbar negative for acute disease  - Small lesion without mass effect or edema, recommend to correlate per neurology if foci and locus are concordant with seizure activity  - It is rare, but nonetheless not impossible, for testicular cancer to be metastatic to the brain  - He will need another PET-CT, can be completed outpatient once stable  - Endocrinology eval for thyroid nodule  - f/u repeat markers including LDH and uric acid, AFP, BHCG quant        # Thyroid nodule  - US Thyroid 01/24/2025 with 1.6cm complex mixed solid cystic hypoechoic nodule in the lower pole of the right thyroid lobe, TIRADS 5.  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 Ovalles 12/03/2024, markers continued to be negative  - See above in regards to brain lesion  - Follow up as outpatient with Franki Ovalles MD     # Thrombocytopenia  - Labile  - Coags wnl, LFTs elevated  - Continue to trend  - Transfuse to maintain >10k, if actively bleeding then maintain >50k     # Acute kidney injury on CKD Stage IIIA  - Worsening    - Likely multifactorial at this point  - Nephrology consult and recs noted  - Continue to trend     # Leukocytosis, Neutrophilia  -Likely reactive  -Continue to trend     # Normocytic anemia  - Chronic, has hx of PRBC during chemo 07/2024  - Noted Anti E, Anti-K Anti-C antibodies previously  - Monitor for bleeding  - Recommend to transfuse to maintain Hb > 7        Thank you for allowing me to participate in the care Mr. Gr, please do not hesitate to call or text me if you have further questions or concerns.        Brayan Mart MD  [facility]   Division of Hematology/Oncology  [address] [facility], [address]  [place] [address]  P: [phone]  F: [phone]    Attestation:    ----Pt evalulated including face-to-face interaction in addition to chart review, reviewing treatment plan, and managing the patient’s chronic diagnoses as listed in the assessment----   Mr. Gr is a 69 year old male with PMHx of seizure disorder, sleep apnea, HTN, chronic idiopathic constipation, stage III CKD, severe obesity, secondary hyperparathyroidism, anemia, thrombocytopenia, hyperlipidemia, testicular cancer s/p EP x 4 under the care of Dr. Ovalles who is admitted s/p fall in setting of possible seizure since 01/16/2025. He was intubated early in his admission due to seziures and extubated 1/21/2025 and then again intubated in setting of respiratory distress, requiring diuresis, with SHAGUFTA on CKD, in the MICU. Imaging shows possible brain metastatic disease and lung mass as well as abnormal thyroid nodule.     Former smoker, quit 14y prior, denied ETOH, drug use. Lives at home. Does not have children. Denied family history of blood disorders or malignancy.  Denied previous workplace related exposures.  Denied previous history of blood transfusions.  Denied history of thromboses.  Denied history of  requiring anticoagulation.        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  Margins were negative.  pT3Nx. CT C/A/P with few left para-aortic and left iliac chain lymph nodes largest measuring 8.1 cm left para-aortic node, bilateral subcentimeter noncalcified pulmonary nodules measuring up to 7 mm. AFP, LDH, hCG were all elevated. 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 Completed four cycles of adjuvant therapy with Cisplatin+Etoposide, with cisplatin dose reduced 50% and Etoposide 50% due to thrombocytopenia at that time. Subsequent scans 08/2024 showed resolution of disease and negative markers,         01/26/2025: continues intubated and sedated in the ICU, discussed with ICU team yesterday, pending markers, endocrinology eval noted        # Brain lesion  # Seizures  -Seizures noted, pt with hx of seizures as well  - Neurology following  - MRI brain now with 5.4 mm enhancing lesion in the LEFT middle cerebellar peduncle with associated edema suspicious for metastases  - MRI Lumbar negative for acute disease  - Small lesion without mass effect or edema, recommend to correlate per neurology if foci and locus are concordant with seizure activity  - It is rare, but nonetheless not impossible, for testicular cancer to be metastatic to the brain  - He will need another PET-CT, can be completed outpatient once stable  - Endocrinology eval for thyroid nodule  - f/u repeat markers including LDH and uric acid, AFP, BHCG quant        # Thyroid nodule  - US Thyroid 01/24/2025 with 1.6cm complex mixed solid cystic hypoechoic nodule in the lower pole of the right thyroid lobe, TIRADS 5.  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 Ovalles 12/03/2024, markers continued to be negative  - See above in regards to brain lesion  - MRI Neck shows .4.2 cm RIGHT upper lobe mass/infiltrate  - Recommend IR guided biopsy of RUL mass, currently intubated and sedate likely not possible  - Follow up as outpatient with Franki Ovalles MD     # Thrombocytopenia  - Labile  - Coags wnl, LFTs elevated  - Continue to trend  - Transfuse to maintain >10k, if actively bleeding then maintain >50k     # Acute kidney injury on CKD Stage IIIA  - Worsening    - Likely multifactorial at this point  - Nephrology consult and recs noted  - Continue to trend     # Leukocytosis, Neutrophilia  -Likely reactive  -Continue to trend     # Normocytic anemia  - Chronic, has hx of PRBC during chemo 07/2024  - Noted Anti E, Anti-K Anti-C antibodies previously  - Monitor for bleeding  - Recommend to transfuse to maintain Hb > 7        Thank you for allowing me to participate in the care Mr. Gr, please do not hesitate to call or text me if you have further questions or concerns.        Brayan Caio Mart MD  [facility]   Division of Hematology/Oncology  [address] [place] [address], [address]  [place] [address]  P: [phone]  F: [phone]    Attestation:    ----Pt evalulated including face-to-face interaction in addition to chart review, reviewing treatment plan, and managing the patient’s chronic diagnoses as listed in the assessment----

## 2025-01-26 NOTE — PROGRESS NOTE ADULT - PROBLEM SELECTOR PLAN 1
-S/p intubation in MICU in the setting of grand mal seizures, pneumonia, Flu  -Completed course of ABX, tamiflu for Flu. Extubated to AVAPS 1/19  -S/p RRT 1/23 AM for fevers, hypoxia requiring AVAPS  -Pt hypertensive, concern for flash pulm edema  -WBC uptrending, febrile to 101F during RRT, ? aspiration event   -S/p 2nd RRT 1/23 PM for increased WOB on AVAPS, intubated and tx to MICU   -Keep O>I as tolerated  -Continue ABX  -Continue bronchodilators  -Keep sats >90%, pH >7.2.  -remains on full vent support:  ct chest showed only 4 mm nodules:   MRI with some patchy opacities in rigth upper lung zone: cont antibiotics

## 2025-01-26 NOTE — PROGRESS NOTE ADULT - SUBJECTIVE AND OBJECTIVE BOX
DATE OF SERVICE: 01-26-25 @ 17:53    Patient is a 69y old  Male who presents with a chief complaint of seizure, flu, elevated trop (26 Jan 2025 16:56)      INTERVAL HISTORY:     REVIEW OF SYSTEMS: Unable to obtain ROS, pt is intubated    TELEMETRY Personally reviewed: NSR 80s  	  MEDICATIONS:      PHYSICAL EXAM:  T(C): 37 (01-26-25 @ 08:00), Max: 37.5 (01-25-25 @ 20:00)  HR: 84 (01-26-25 @ 14:15) (80 - 92)  BP: 137/72 (01-26-25 @ 09:00) (99/56 - 154/80)  RR: 25 (01-26-25 @ 09:00) (22 - 27)  SpO2: 100% (01-26-25 @ 14:15) (98% - 100%)  Wt(kg): --  I&O's Summary    25 Jan 2025 07:01  -  26 Jan 2025 07:00  --------------------------------------------------------  IN: 1343.4 mL / OUT: 2695 mL / NET: -1351.6 mL    26 Jan 2025 07:01  -  26 Jan 2025 17:53  --------------------------------------------------------  IN: 18.6 mL / OUT: 350 mL / NET: -331.4 mL      Appearance: In no distress	  HEENT:    PERRL, EOMI	  Cardiovascular:  S1 S2, No JVD  Respiratory: Lungs clear to auscultation	  Gastrointestinal:  Soft, Non-tender, + BS	  Vascularature:  No edema of LE  Psychiatric: Appropriate affect   Neuro: intubated                               8.8    9.04  )-----------( 166      ( 26 Jan 2025 00:16 )             26.6     01-26    142  |  100  |  97[H]  ----------------------------<  130[H]  3.8   |  19[L]  |  4.66[H]    Ca    9.5      26 Jan 2025 12:10  Phos  9.3     01-26  Mg     2.9     01-26    TPro  7.0  /  Alb  3.2[L]  /  TBili  0.6  /  DBili  x   /  AST  83[H]  /  ALT  96[H]  /  AlkPhos  154[H]  01-26        Labs personally reviewed      ASSESSMENT/PLAN: 	    69M w/ hx of metastatic testicular cancer (s/p L orchiectomy, on etoposide/cisplatin chemo, last dose 6/2024), epilepsy (grand-mal seizures), schizophrenia, developmental delay, HTN, HLD, VAZQUEZ, stage III CKD, obesity, secondary hyperparathyroidism, anemia, thrombocytopenia, ? spinal surgery who was admitted on 1/16/25 for fall and syncope. On 1/17 patient had multiple RRTs called for grand mal seizure activity and  patient had x2 more episode and was ultimately given ativan 2mg, Vimpat load 200mg, and intubated for airway protection with MICU transfer. Extubated 1/19 - to AVAPS, post extubation with increased secretion, now better; downgraded 1/21; While on medical floors; patient developed respiratory distress; now intubated for suspected aspiration pna vs flash pulmonary edema.     Problem/Plan #1: Acute Hypoxic Respiratory Failure  - Intubated for airway protection i/s/o multiple RRTs for grand mal seizure activity, Extubated 1/19 to AVAPS  - Reintubated 1/23 after hypoxic episode for suspected aspiration pna vs flash pulmonary edema.   - BNP 3949  - CXR shows mild pulm edema on 1/16; Repeat CXR pending  - TTE with preserved EF, no WMA, dilated aortic root  - Was on Bumex IV - stopped on 1/25 due to ^ creatinine 4.31 from 3.92  - Monitor creat  - c/w IV Abx as per primary team  - Monitor strict I&Os, daily weights    Problem/Plan #2: Hypertensive Urgency  - amlodipine increased to 10mg daily  - BP now soft. Exercise caution in further uptitrating medications.     Problem/Plan #3: HLD  - c/w atorvastatin 20mg daily    Problem/Plan #4: DVT Ppx  - c/w SQ heparin    Allyson Oscar, NP-C  Gus Javdan, DO [facility]  Cardiovascular Medicine  [address] [place] [address], [address]  Available through call or text on [employer]  Office: [phone]

## 2025-01-26 NOTE — PROGRESS NOTE ADULT - SUBJECTIVE AND OBJECTIVE BOX
*******************************  Jesse Ahn MD (PGY-1)  Internal Medicine  Contact via [employer] TEAMS    *******************************    INTERVAL HPI/OVERNIGHT EVENTS: No acute events overnight.    SUBJECTIVE: Patient seen and examined at bedside.     MEDICATIONS  (STANDING):  albuterol/ipratropium for Nebulization 3 milliLiter(s) Nebulizer every 6 hours  atorvastatin 20 milliGRAM(s) Oral at bedtime  bacitracin   Ointment 1 Application(s) Topical two times a day  chlorhexidine 0.12% Liquid 15 milliLiter(s) Oral Mucosa every 12 hours  chlorhexidine 2% Cloths 1 Application(s) Topical <User Schedule>  chlorhexidine 2% Cloths 1 Application(s) Topical <User Schedule>  escitalopram 15 milliGRAM(s) Oral daily  gabapentin Solution 300 milliGRAM(s) Oral two times a day  heparin   Injectable 7500 Unit(s) SubCutaneous every 12 hours  influenza  Vaccine (HIGH DOSE) 0.5 milliLiter(s) IntraMuscular once  lacosamide IVPB 100 milliGRAM(s) IV Intermittent every 12 hours  lamoTRIgine 100 milliGRAM(s) Oral two times a day  levETIRAcetam  IVPB 1500 milliGRAM(s) IV Intermittent two times a day  lidocaine   4% Patch 1 Patch Transdermal daily  lurasidone 40 milliGRAM(s) Oral daily  pantoprazole  Injectable 40 milliGRAM(s) IV Push daily  piperacillin/tazobactam IVPB.. 3.375 Gram(s) IV Intermittent every 12 hours  polyethylene glycol 3350 17 Gram(s) Oral daily  propofol Infusion 25 MICROgram(s)/kG/Min (15.5 mL/Hr) IV Continuous <Continuous>  senna Syrup 10 milliLiter(s) Oral at bedtime  sodium chloride 3%  Inhalation 4 milliLiter(s) Inhalation every 12 hours    MEDICATIONS  (PRN):  fentaNYL    Injectable 25 MICROGram(s) IV Push every 4 hours PRN Moderate Pain (4 - 6)  nystatin Powder 1 Application(s) Topical three times a day PRN fungal infection you may see and want to treat      ALLERGIES:  Allergies    penicillin (Hives)  seasonal allergies (Unknown)    Intolerances    latex (Rash)      VITAL SIGNS:  ICU Vital Signs Last 24 Hrs  T(C): 37.3 (26 Jan 2025 04:00), Max: 37.5 (25 Jan 2025 20:00)  T(F): 99.1 (26 Jan 2025 04:00), Max: 99.5 (25 Jan 2025 20:00)  HR: 89 (26 Jan 2025 06:00) (80 - 92)  BP: 138/82 (26 Jan 2025 06:00) (99/56 - 154/80)  BP(mean): 104 (26 Jan 2025 06:00) (71 - 110)  ABP: --  ABP(mean): --  RR: 24 (26 Jan 2025 06:00) (24 - 27)  SpO2: 100% (26 Jan 2025 06:00) (98% - 100%)    O2 Parameters below as of 26 Jan 2025 05:16  Patient On (Oxygen Delivery Method): ventilator          Mode: AC/ CMV (Assist Control/ Continuous Mandatory Ventilation), RR (machine): 24, TV (machine): 450, FiO2: 30, PEEP: 5, ITime: 1, MAP: 11, PIP: 24  Plateau pressure:   P/F ratio:     01-25 @ 07:01  -  01-26 @ 07:00  --------------------------------------------------------  IN: 1343.4 mL / OUT: 2695 mL / NET: -1351.6 mL      CAPILLARY BLOOD GLUCOSE      POCT Blood Glucose.: 141 mg/dL (26 Jan 2025 04:58)    ECG:    PHYSICAL EXAM:  General: Intubated, sedated  HEENT: Normocephalic. Atraumatic. Normal icterus. MMM  Heart: Regular rate, rhythm  Lungs: faint crackles, mild wheeze, no distress.  GI: Soft, distended/obese, nontender  Neuro: Sedated, pupils appropriate  Ext: Mild edema bilaterally.   Skin: Warm, well-perfused     LINES:     LABS:                        8.8    9.04  )-----------( 166      ( 26 Jan 2025 00:16 )             26.6     01-26    141  |  99  |  91[H]  ----------------------------<  126[H]  3.6   |  20[L]  |  4.70[H]    Ca    9.2      26 Jan 2025 00:17  Phos  8.4     01-26  Mg     2.6     01-26    TPro  6.7  /  Alb  3.2[L]  /  TBili  0.5  /  DBili  x   /  AST  99[H]  /  ALT  103[H]  /  AlkPhos  157[H]  01-26    PT/INR - ( 26 Jan 2025 00:17 )   PT: 10.9 sec;   INR: 0.95 ratio         PTT - ( 26 Jan 2025 00:17 )  PTT:28.4 sec  Urinalysis Basic - ( 26 Jan 2025 00:17 )    Color: x / Appearance: x / SG: x / pH: x  Gluc: 126 mg/dL / Ketone: x  / Bili: x / Urobili: x   Blood: x / Protein: x / Nitrite: x   Leuk Esterase: x / RBC: x / WBC x   Sq Epi: x / Non Sq Epi: x / Bacteria: x        RADIOLOGY & ADDITIONAL TESTS:  *******************************  Jesse Ahn MD (PGY-1)  Internal Medicine  Contact via [employer] TEAMS    *******************************    INTERVAL HPI/OVERNIGHT EVENTS: No acute events overnight.     SUBJECTIVE: Patient seen and examined at bedside.     MEDICATIONS  (STANDING):  albuterol/ipratropium for Nebulization 3 milliLiter(s) Nebulizer every 6 hours  atorvastatin 20 milliGRAM(s) Oral at bedtime  bacitracin   Ointment 1 Application(s) Topical two times a day  chlorhexidine 0.12% Liquid 15 milliLiter(s) Oral Mucosa every 12 hours  chlorhexidine 2% Cloths 1 Application(s) Topical <User Schedule>  chlorhexidine 2% Cloths 1 Application(s) Topical <User Schedule>  escitalopram 15 milliGRAM(s) Oral daily  gabapentin Solution 300 milliGRAM(s) Oral two times a day  heparin   Injectable 7500 Unit(s) SubCutaneous every 12 hours  influenza  Vaccine (HIGH DOSE) 0.5 milliLiter(s) IntraMuscular once  lacosamide IVPB 100 milliGRAM(s) IV Intermittent every 12 hours  lamoTRIgine 100 milliGRAM(s) Oral two times a day  levETIRAcetam  IVPB 1500 milliGRAM(s) IV Intermittent two times a day  lidocaine   4% Patch 1 Patch Transdermal daily  lurasidone 40 milliGRAM(s) Oral daily  pantoprazole  Injectable 40 milliGRAM(s) IV Push daily  piperacillin/tazobactam IVPB.. 3.375 Gram(s) IV Intermittent every 12 hours  polyethylene glycol 3350 17 Gram(s) Oral daily  propofol Infusion 25 MICROgram(s)/kG/Min (15.5 mL/Hr) IV Continuous <Continuous>  senna Syrup 10 milliLiter(s) Oral at bedtime  sodium chloride 3%  Inhalation 4 milliLiter(s) Inhalation every 12 hours    MEDICATIONS  (PRN):  fentaNYL    Injectable 25 MICROGram(s) IV Push every 4 hours PRN Moderate Pain (4 - 6)  nystatin Powder 1 Application(s) Topical three times a day PRN fungal infection you may see and want to treat      ALLERGIES:  Allergies    penicillin (Hives)  seasonal allergies (Unknown)    Intolerances    latex (Rash)      VITAL SIGNS:  ICU Vital Signs Last 24 Hrs  T(C): 37.3 (26 Jan 2025 04:00), Max: 37.5 (25 Jan 2025 20:00)  T(F): 99.1 (26 Jan 2025 04:00), Max: 99.5 (25 Jan 2025 20:00)  HR: 89 (26 Jan 2025 06:00) (80 - 92)  BP: 138/82 (26 Jan 2025 06:00) (99/56 - 154/80)  BP(mean): 104 (26 Jan 2025 06:00) (71 - 110)  ABP: --  ABP(mean): --  RR: 24 (26 Jan 2025 06:00) (24 - 27)  SpO2: 100% (26 Jan 2025 06:00) (98% - 100%)    O2 Parameters below as of 26 Jan 2025 05:16  Patient On (Oxygen Delivery Method): ventilator          Mode: AC/ CMV (Assist Control/ Continuous Mandatory Ventilation), RR (machine): 24, TV (machine): 450, FiO2: 30, PEEP: 5, ITime: 1, MAP: 11, PIP: 24  Plateau pressure:   P/F ratio:     01-25 @ 07:01  -  01-26 @ 07:00  --------------------------------------------------------  IN: 1343.4 mL / OUT: 2695 mL / NET: -1351.6 mL      CAPILLARY BLOOD GLUCOSE      POCT Blood Glucose.: 141 mg/dL (26 Jan 2025 04:58)    ECG:    PHYSICAL EXAM:  General: Intubated, sedated  HEENT: Normocephalic. Atraumatic. Normal icterus. MMM  Heart: Regular rate, rhythm  Lungs: faint crackles, mild wheeze, no distress.  GI: Soft, distended/obese, nontender  Neuro: Sedated, pupils appropriate  Ext: Mild edema bilaterally.   Skin: Warm, well-perfused     LINES:     LABS:                        8.8    9.04  )-----------( 166      ( 26 Jan 2025 00:16 )             26.6     01-26    141  |  99  |  91[H]  ----------------------------<  126[H]  3.6   |  20[L]  |  4.70[H]    Ca    9.2      26 Jan 2025 00:17  Phos  8.4     01-26  Mg     2.6     01-26    TPro  6.7  /  Alb  3.2[L]  /  TBili  0.5  /  DBili  x   /  AST  99[H]  /  ALT  103[H]  /  AlkPhos  157[H]  01-26    PT/INR - ( 26 Jan 2025 00:17 )   PT: 10.9 sec;   INR: 0.95 ratio         PTT - ( 26 Jan 2025 00:17 )  PTT:28.4 sec  Urinalysis Basic - ( 26 Jan 2025 00:17 )    Color: x / Appearance: x / SG: x / pH: x  Gluc: 126 mg/dL / Ketone: x  / Bili: x / Urobili: x   Blood: x / Protein: x / Nitrite: x   Leuk Esterase: x / RBC: x / WBC x   Sq Epi: x / Non Sq Epi: x / Bacteria: x        RADIOLOGY & ADDITIONAL TESTS:

## 2025-01-26 NOTE — PROGRESS NOTE ADULT - SUBJECTIVE AND OBJECTIVE BOX
[facility] HEMATOLOGY/ONCOLOGY INPATIENT PROGRESS NOTE     Interval Hx:   01-26-25: Mr. Gr was seen at bedside today.    Meds:   MEDICATIONS  (STANDING):  albuterol/ipratropium for Nebulization 3 milliLiter(s) Nebulizer every 6 hours  atorvastatin 20 milliGRAM(s) Oral at bedtime  bacitracin   Ointment 1 Application(s) Topical two times a day  chlorhexidine 0.12% Liquid 15 milliLiter(s) Oral Mucosa every 12 hours  chlorhexidine 2% Cloths 1 Application(s) Topical <User Schedule>  chlorhexidine 2% Cloths 1 Application(s) Topical <User Schedule>  escitalopram 15 milliGRAM(s) Oral daily  gabapentin Solution 300 milliGRAM(s) Oral two times a day  heparin   Injectable 7500 Unit(s) SubCutaneous every 12 hours  influenza  Vaccine (HIGH DOSE) 0.5 milliLiter(s) IntraMuscular once  lacosamide IVPB 100 milliGRAM(s) IV Intermittent every 12 hours  lamoTRIgine 100 milliGRAM(s) Oral two times a day  levETIRAcetam  IVPB 1500 milliGRAM(s) IV Intermittent two times a day  lidocaine   4% Patch 1 Patch Transdermal daily  lurasidone 40 milliGRAM(s) Oral daily  pantoprazole  Injectable 40 milliGRAM(s) IV Push daily  piperacillin/tazobactam IVPB.. 3.375 Gram(s) IV Intermittent every 12 hours  polyethylene glycol 3350 17 Gram(s) Oral daily  propofol Infusion 25 MICROgram(s)/kG/Min (15.5 mL/Hr) IV Continuous <Continuous>  senna Syrup 10 milliLiter(s) Oral at bedtime  sodium chloride 3%  Inhalation 4 milliLiter(s) Inhalation every 12 hours    MEDICATIONS  (PRN):  fentaNYL    Injectable 25 MICROGram(s) IV Push every 4 hours PRN Moderate Pain (4 - 6)  nystatin Powder 1 Application(s) Topical three times a day PRN fungal infection you may see and want to treat    Vital Signs Last 24 Hrs  T(C): 37.3 (26 Jan 2025 04:00), Max: 37.5 (25 Jan 2025 20:00)  T(F): 99.1 (26 Jan 2025 04:00), Max: 99.5 (25 Jan 2025 20:00)  HR: 80 (26 Jan 2025 05:16) (80 - 92)  BP: 114/66 (26 Jan 2025 05:00) (99/56 - 154/80)  BP(mean): 84 (26 Jan 2025 05:00) (71 - 110)  RR: 24 (26 Jan 2025 05:00) (24 - 27)  SpO2: 98% (26 Jan 2025 05:16) (98% - 100%)    Parameters below as of 26 Jan 2025 05:16  Patient On (Oxygen Delivery Method): ventilator    Physical Exam:  Gen: Intubated, sedated  HEENT: intubated  Chest: equal chest rise  Cardiac: +S1 S2  Abd: non distended   Neuro: sedated    Labs:                        8.8    9.04  )-----------( 166      ( 26 Jan 2025 00:16 )             26.6     CBC Full  -  ( 26 Jan 2025 00:16 )  WBC Count : 9.04 K/uL  RBC Count : 2.76 M/uL  Hemoglobin : 8.8 g/dL  Hematocrit : 26.6 %  Platelet Count - Automated : 166 K/uL  Mean Cell Volume : 96.4 fl  Mean Cell Hemoglobin : 31.9 pg  Mean Cell Hemoglobin Concentration : 33.1 g/dL  Auto Neutrophil # : 7.67 K/uL  Auto Lymphocyte # : 0.65 K/uL  Auto Monocyte # : 0.49 K/uL  Auto Eosinophil # : 0.08 K/uL  Auto Basophil # : 0.02 K/uL  Auto Neutrophil % : 84.9 %  Auto Lymphocyte % : 7.2 %  Auto Monocyte % : 5.4 %  Auto Eosinophil % : 0.9 %  Auto Basophil % : 0.2 %    01-26    141  |  99  |  91[H]  ----------------------------<  126[H]  3.6   |  20[L]  |  4.70[H]    Ca    9.2      26 Jan 2025 00:17  Phos  8.4     01-26  Mg     2.6     01-26    TPro  6.7  /  Alb  3.2[L]  /  TBili  0.5  /  DBili  x   /  AST  99[H]  /  ALT  103[H]  /  AlkPhos  157[H]  01-26    PT/INR - ( 26 Jan 2025 00:17 )   PT: 10.9 sec;   INR: 0.95 ratio         PTT - ( 26 Jan 2025 00:17 )  PTT:28.4 sec  Bilirubin Total: 0.5 mg/dL (01-26-25 @ 00:17)  Bilirubin Total: 0.4 mg/dL (01-25-25 @ 17:46)  Bilirubin Total: 0.5 mg/dL (01-25-25 @ 11:31)   [facility] HEMATOLOGY/ONCOLOGY INPATIENT PROGRESS NOTE     Interval Hx:   01-26-25: Mr. Gr was seen at bedside today, continues intubated and sedated in the ICU, discussed with ICU team yesterday, pending markers, endocrinology eval noted     Meds:   MEDICATIONS  (STANDING):  albuterol/ipratropium for Nebulization 3 milliLiter(s) Nebulizer every 6 hours  atorvastatin 20 milliGRAM(s) Oral at bedtime  bacitracin   Ointment 1 Application(s) Topical two times a day  chlorhexidine 0.12% Liquid 15 milliLiter(s) Oral Mucosa every 12 hours  chlorhexidine 2% Cloths 1 Application(s) Topical <User Schedule>  chlorhexidine 2% Cloths 1 Application(s) Topical <User Schedule>  escitalopram 15 milliGRAM(s) Oral daily  gabapentin Solution 300 milliGRAM(s) Oral two times a day  heparin   Injectable 7500 Unit(s) SubCutaneous every 12 hours  influenza  Vaccine (HIGH DOSE) 0.5 milliLiter(s) IntraMuscular once  lacosamide IVPB 100 milliGRAM(s) IV Intermittent every 12 hours  lamoTRIgine 100 milliGRAM(s) Oral two times a day  levETIRAcetam  IVPB 1500 milliGRAM(s) IV Intermittent two times a day  lidocaine   4% Patch 1 Patch Transdermal daily  lurasidone 40 milliGRAM(s) Oral daily  pantoprazole  Injectable 40 milliGRAM(s) IV Push daily  piperacillin/tazobactam IVPB.. 3.375 Gram(s) IV Intermittent every 12 hours  polyethylene glycol 3350 17 Gram(s) Oral daily  propofol Infusion 25 MICROgram(s)/kG/Min (15.5 mL/Hr) IV Continuous <Continuous>  senna Syrup 10 milliLiter(s) Oral at bedtime  sodium chloride 3%  Inhalation 4 milliLiter(s) Inhalation every 12 hours    MEDICATIONS  (PRN):  fentaNYL    Injectable 25 MICROGram(s) IV Push every 4 hours PRN Moderate Pain (4 - 6)  nystatin Powder 1 Application(s) Topical three times a day PRN fungal infection you may see and want to treat    Vital Signs Last 24 Hrs  T(C): 37.3 (26 Jan 2025 04:00), Max: 37.5 (25 Jan 2025 20:00)  T(F): 99.1 (26 Jan 2025 04:00), Max: 99.5 (25 Jan 2025 20:00)  HR: 80 (26 Jan 2025 05:16) (80 - 92)  BP: 114/66 (26 Jan 2025 05:00) (99/56 - 154/80)  BP(mean): 84 (26 Jan 2025 05:00) (71 - 110)  RR: 24 (26 Jan 2025 05:00) (24 - 27)  SpO2: 98% (26 Jan 2025 05:16) (98% - 100%)    Parameters below as of 26 Jan 2025 05:16  Patient On (Oxygen Delivery Method): ventilator    Physical Exam:  Gen: Intubated, sedated  HEENT: intubated  Chest: equal chest rise  Cardiac: +S1 S2  Abd: non distended   Neuro: sedated    Labs:                        8.8    9.04  )-----------( 166      ( 26 Jan 2025 00:16 )             26.6     CBC Full  -  ( 26 Jan 2025 00:16 )  WBC Count : 9.04 K/uL  RBC Count : 2.76 M/uL  Hemoglobin : 8.8 g/dL  Hematocrit : 26.6 %  Platelet Count - Automated : 166 K/uL  Mean Cell Volume : 96.4 fl  Mean Cell Hemoglobin : 31.9 pg  Mean Cell Hemoglobin Concentration : 33.1 g/dL  Auto Neutrophil # : 7.67 K/uL  Auto Lymphocyte # : 0.65 K/uL  Auto Monocyte # : 0.49 K/uL  Auto Eosinophil # : 0.08 K/uL  Auto Basophil # : 0.02 K/uL  Auto Neutrophil % : 84.9 %  Auto Lymphocyte % : 7.2 %  Auto Monocyte % : 5.4 %  Auto Eosinophil % : 0.9 %  Auto Basophil % : 0.2 %    01-26    141  |  99  |  91[H]  ----------------------------<  126[H]  3.6   |  20[L]  |  4.70[H]    Ca    9.2      26 Jan 2025 00:17  Phos  8.4     01-26  Mg     2.6     01-26    TPro  6.7  /  Alb  3.2[L]  /  TBili  0.5  /  DBili  x   /  AST  99[H]  /  ALT  103[H]  /  AlkPhos  157[H]  01-26    PT/INR - ( 26 Jan 2025 00:17 )   PT: 10.9 sec;   INR: 0.95 ratio         PTT - ( 26 Jan 2025 00:17 )  PTT:28.4 sec  Bilirubin Total: 0.5 mg/dL (01-26-25 @ 00:17)  Bilirubin Total: 0.4 mg/dL (01-25-25 @ 17:46)  Bilirubin Total: 0.5 mg/dL (01-25-25 @ 11:31)

## 2025-01-26 NOTE — PROGRESS NOTE ADULT - ASSESSMENT
ASSESSMENT AND PLAN:  69M w/ hx of metastatic testicular cancer (s/p L orchiectomy, on etoposide/cisplatin chemo, last dose 6/2024), epilepsy (grand-mal seizures), schizophrenia, developmental delay, HTN, HLD, VAZQUEZ, stage III CKD, obesity, secondary hyperparathyroidism, anemia, thrombocytopenia, ? spinal surgery who was admitted on 1/16/25 for fall and syncope. On 1/17 patient had multiple RRTs called for grand mal seizure activity and  patient had x2 more episode and was ultimately given ativan 2mg, Vimpat load 200mg, and intubated for airway protection with MICU transfer. Extubated 1/19 - to AVAPS, post extubation with increased secretion, now better; downgraded 1/21; While on medical floors; patient developed respiratory distress; now intubated for suspected aspiration pna vs flash pulmonary edema with new fever and leukocytosis.     CHANGES/UPDATES TODAY  - Oncology (darren Ovalles colleague) contacted for possible MRI showing mets to brain, lung, thyroid  - Ortho now consulting for MRI showing a possible detachment of rods in back  - Stopped bumex gtt with most recent POCUS w/out B lines, collapsible IVC  - Weaned vent settings: FiO2 (30% <--40%), PEEP (5<---8)  - Treating aspiration pneumonia with zosyn  - Continuing AEDs as below and if seizure, ativan    ===NEURO===  #Intubated #Sedated  - Sedation: Precedex, Propofol, wean as able   - Pain: PRN fentanyl 25mcg q4h, PRN Lidocaine patch 4%  - SATs as able    #Status Epilepticus  #Grand Mal Seizures  #?MRI with mets vs abnl lesion  - AEDs: Keppra 1.5g BID, Lamotrigine 100mg BID, Gabapentin 300mg BID, Vimpat 100mg BID  - For Seizures: Ativan  - Neuro following, appreciate recs  - Onc as below  - s/p vEEG negative for seizures  - s/p CTH negative for acute findings    #Schizophrenia  #Depression  - Escitalopram 15mg daily  - Lurasidone 40mg daily    ===CVS===  #HTN #HLD  #?Flash Pulmonary Edema  #Normal EF (62%)  - Atorvastatin 20mg qhs  - GOAL: Net negative, Normotension    #Hypotension  - Norepi prn, wean as able    ===PULM===  #Respiratory Failure s/t ?Edema vs PNA  #VAZQUEZ on CPAP at home-->?AVAPS  - Duonebs 3mL q4h  - Saline Nebs 4mL BID  - Antibiotics as below  - Cont Vent, daily SBTs as able, wean as able  - S/p Intubation 1/17 for status epilepticus  - S/p Extubation 1/23 to AVAPS    ===RENAL===  #Metabolic Acidosis  #SHAGUFTA on CKD Stage III  #Electrolytes  - Monitor BP and urine output, if concerning may need fluids  - Dao Morris following patient and is available, much appreciated  - s/p bumex 4mg gtt with good effect    ===GI===  #Diet #OG Tube  #Aspiration risk  #Bowel Regimen  - Tube feeds  - Pantoprazole 40mg daily  - Senna, Miralax    ===ID===  #?Aspiration Pneumonia  #Pneumonia RLL, CAP, treated  #FluA+, treated  - Zosyn 3.375g BID (1/23-?) empirically, if rash start cefepime  - BCx: 1/23(NGTD), 1/19 (NGTD 4d)  - Sputum Cx: 1/23 (No sufficient sputum), 1/18 (neg)  - Labs: MRSA (1/23 neg), Legionella (1/24 neg), Strep Ag (1/24 neg)  - S/p Ceftriaxone 1g daily (1/17-1/21) for CAP  - S/p Tamiflu 30mg BID (1/18-1/21) for FluA     ===ENDO===  #At risk of hypoglycemia  #Hx of secondary hyperparathyroidism  - FS q6h while NPO    ===HEME/ONC===  #Metastatic testicular cancer  #Last chemo 06/2024 (etoposide/cisplatin)  #S/p L. Orchiectomy  #New Neck Nodule: US/MRI with concern for thyroid cancer TIRADS 5  #New Lung Nodule: 4cm on MRI and additional nodule on CT 4mm   #New Brain Enhancing Lesion: 5.4mm lesion in white matter near ventricles  - Dr. Franki Ovalles ([facility]) aware, will have colleague round  - Awaiting recommendations    #DVT ppx  - Heparin SQ 7500U    ===ETHICS===  #Code Status: FULL CODE.  - Palliative consulted, following   ASSESSMENT AND PLAN:  69M w/ hx of metastatic testicular cancer (s/p L orchiectomy, on etoposide/cisplatin chemo, last dose 6/2024), epilepsy (grand-mal seizures), schizophrenia, developmental delay, HTN, HLD, VAZQUEZ, stage III CKD, obesity, secondary hyperparathyroidism, anemia, thrombocytopenia, ? spinal surgery who was admitted on 1/16/25 for fall and syncope. On 1/17 patient had multiple RRTs called for grand mal seizure activity and  patient had x2 more episode and was ultimately given ativan 2mg, Vimpat load 200mg, and intubated for airway protection with MICU transfer. Extubated 1/19 - to AVAPS, post extubation with increased secretion, now better; downgraded 1/21; While on medical floors; patient developed respiratory distress; now intubated for suspected aspiration pna vs flash pulmonary edema with new fever and leukocytosis.     CHANGES/UPDATES TODAY  - Oncology (darren Ovalles colleague) contacted for possible MRI showing mets to brain, lung, thyroid  - Ortho consulted, concerned about proximal junction kyphosis -> CT T and L spine non-con with metal reduction sequencing, unlikely for acute intervention if no focal deficits   - sevelamer and rasburicase for phos/uric acid  - Stopped bumex gtt with most recent POCUS w/out B lines, collapsible IVC -> trial of no hopson with frequent bladder scans (UOP good overnight and 100-150cc/hr this AM)  - Weaned vent settings: FiO2 (30% <--40%), PEEP (5<---8)  - Treating aspiration pneumonia with zosyn  - Continuing AEDs as below and if seizure, ativan    ===NEURO===  #Intubated #Sedated  - Sedation: Precedex, Propofol, wean as able   - Pain: PRN fentanyl 25mcg q4h, PRN Lidocaine patch 4%  - SATs as able    #Status Epilepticus  #Grand Mal Seizures  #?MRI with mets vs abnl lesion  - AEDs: Keppra 1.5g BID, Lamotrigine 100mg BID, Gabapentin 300mg BID, Vimpat 100mg BID  - For Seizures: Ativan  - Neuro following, appreciate recs  - Onc as below  - s/p vEEG negative for seizures  - s/p CTH negative for acute findings    #Schizophrenia  #Depression  - Escitalopram 15mg daily  - Lurasidone 40mg daily    ===CVS===  #HTN #HLD  #?Flash Pulmonary Edema  #Normal EF (62%)  - Atorvastatin 20mg qhs  - GOAL: Net negative, Normotension    #Hypotension  - Norepi prn, wean as able    ===PULM===  #Respiratory Failure s/t ?Edema vs PNA  #VAZQUEZ on CPAP at home-->?AVAPS  - Duonebs 3mL q4h  - Saline Nebs 4mL BID  - Antibiotics as below  - Cont Vent, daily SBTs as able, wean as able  - S/p Intubation 1/17 for status epilepticus  - S/p Extubation 1/23 to AVAPS    ===RENAL===  #Metabolic Acidosis  #SHAGUFTA on CKD Stage III  #Electrolytes  - Monitor BP and urine output, if concerning may need fluids  - Dao Morris following patient and is available, much appreciated  - s/p bumex 4mg gtt with good effect    ===GI===  #Diet #OG Tube  #Aspiration risk  #Bowel Regimen  - Tube feeds  - Pantoprazole 40mg daily  - Senna, Miralax    ===ID===  #?Aspiration Pneumonia  #Pneumonia RLL, CAP, treated  #FluA+, treated  - Zosyn 3.375g BID (1/23-?) empirically, if rash start cefepime  - BCx: 1/23(NGTD), 1/19 (NGTD 4d)  - Sputum Cx: 1/23 (No sufficient sputum), 1/18 (neg)  - Labs: MRSA (1/23 neg), Legionella (1/24 neg), Strep Ag (1/24 neg)  - S/p Ceftriaxone 1g daily (1/17-1/21) for CAP  - S/p Tamiflu 30mg BID (1/18-1/21) for FluA     ===ENDO===  #At risk of hypoglycemia  #Hx of secondary hyperparathyroidism  - FS q6h while NPO    ===HEME/ONC===  #Metastatic testicular cancer  #Last chemo 06/2024 (etoposide/cisplatin)  #S/p L. Orchiectomy  #New Neck Nodule: US/MRI with concern for thyroid cancer TIRADS 5  #New Lung Nodule: 4cm on MRI and additional nodule on CT 4mm   #New Brain Enhancing Lesion: 5.4mm lesion in white matter near ventricles  - Dr. Franki Ovalles ([facility]) aware, will have colleague round  - Awaiting recommendations    #DVT ppx  - Heparin SQ 7500U    ===ETHICS===  #Code Status: FULL CODE.  - Palliative consulted, following

## 2025-01-26 NOTE — PROGRESS NOTE ADULT - SUBJECTIVE AND OBJECTIVE BOX
[facility] Progress Note    MAR, chart notes, and labs reviewed    Subjective: no events    Objective  Vital Signs  T(C): 37.3 (01-26-25 @ 04:00), Max: 37.5 (01-25-25 @ 20:00)  HR: 80 (01-26-25 @ 05:16) (80 - 92)  BP: 114/66 (01-26-25 @ 05:00) (99/56 - 154/80)  RR: 24 (01-26-25 @ 05:00) (24 - 27)  SpO2: 98% (01-26-25 @ 05:16) (98% - 100%)    Physical Exam  intubated  sedated    Medications  albuterol/ipratropium for Nebulization 3 milliLiter(s) Nebulizer every 6 hours  atorvastatin 20 milliGRAM(s) Oral at bedtime  bacitracin   Ointment 1 Application(s) Topical two times a day  chlorhexidine 0.12% Liquid 15 milliLiter(s) Oral Mucosa every 12 hours  chlorhexidine 2% Cloths 1 Application(s) Topical <User Schedule>  chlorhexidine 2% Cloths 1 Application(s) Topical <User Schedule>  escitalopram 15 milliGRAM(s) Oral daily  fentaNYL    Injectable 25 MICROGram(s) IV Push every 4 hours PRN  gabapentin Solution 300 milliGRAM(s) Oral two times a day  heparin   Injectable 7500 Unit(s) SubCutaneous every 12 hours  influenza  Vaccine (HIGH DOSE) 0.5 milliLiter(s) IntraMuscular once  lacosamide IVPB 100 milliGRAM(s) IV Intermittent every 12 hours  lamoTRIgine 100 milliGRAM(s) Oral two times a day  levETIRAcetam  IVPB 1500 milliGRAM(s) IV Intermittent two times a day  lidocaine   4% Patch 1 Patch Transdermal daily  lurasidone 40 milliGRAM(s) Oral daily  nystatin Powder 1 Application(s) Topical three times a day PRN  pantoprazole  Injectable 40 milliGRAM(s) IV Push daily  piperacillin/tazobactam IVPB.. 3.375 Gram(s) IV Intermittent every 12 hours  polyethylene glycol 3350 17 Gram(s) Oral daily  propofol Infusion 25 MICROgram(s)/kG/Min IV Continuous <Continuous>  senna Syrup 10 milliLiter(s) Oral at bedtime  sodium chloride 3%  Inhalation 4 milliLiter(s) Inhalation every 12 hours    Pertinent labs/Imaging  POCT Blood Glucose.: 141 mg/dL (01-26-25 @ 04:58)    eGFR: 13 mL/min/1.73m2 (01-26-25 @ 00:17)  eGFR: 13 mL/min/1.73m2 (01-25-25 @ 17:46)  eGFR: 13 mL/min/1.73m2 (01-25-25 @ 11:31)  eGFR: 14 mL/min/1.73m2 (01-25-25 @ 01:18)  eGFR: 16 mL/min/1.73m2 (01-24-25 @ 17:15)  eGFR: 18 mL/min/1.73m2 (01-24-25 @ 10:29)

## 2025-01-26 NOTE — CONSULT NOTE ADULT - SUBJECTIVE AND OBJECTIVE BOX
HPI  69yMale intubated/sedated in MICU for seizures and associated fall, PSH of spinal fusion (history and etiology unknown), testicular cancer with new brain mets who was seen on MRI lumbar spine to have concern for hardware failure. Per MICU resident and neurology consultation note, patient was without focal motor deficits at presentation however unsure if complaining of back pain.       ROS  Negative unless otherwise specified in HPI.    PAST MEDICAL & SURGICAL Hx  PAST MEDICAL & SURGICAL HISTORY:  Hypertension, unspecified type      Other hyperlipidemia      History of epilepsy      Paranoid schizophrenia      Obstructive sleep apnea      Testicular cancer      H/O Spinal surgery          MEDICATIONS  Home Medications:  atorvastatin 20 mg oral tablet: 1 tab(s) orally once a day (16 Jan 2025 17:20)  cholecalciferol 25 mcg (1000 intl units) oral tablet: 1 tab(s) orally once a day (16 Jan 2025 17:21)  Claritin 10 mg oral tablet: 1 tab(s) orally once a day (in the afternoon) (16 Jan 2025 17:11)  empagliflozin 10 mg oral tablet: 1 tab(s) orally once a day (16 Jan 2025 17:21)  Keppra 1000 mg oral tablet: 1 tab(s) orally 2 times a day (16 Jan 2025 17:20)  Latuda 40 mg oral tablet: 1 tab(s) orally once a day (at bedtime) (16 Jan 2025 17:20)  Lexapro 10 mg oral tablet: 1 tab(s) orally once a day +5mg tablet; TOTAL: 15mg (16 Jan 2025 17:20)  Lexapro 5 mg oral tablet: 1 tab(s) orally once a day +10mg; TOTA: 15mg (16 Jan 2025 17:20)  lisinopril 5 mg oral tablet: 1 tab(s) orally once a day (16 Jan 2025 17:21)  Neurontin 300 mg oral capsule: 1 cap(s) orally 2 times a day (16 Jan 2025 17:20)      ALLERGIES  latex (Rash)  penicillin (Hives)  seasonal allergies (Unknown)      FAMILY Hx  FAMILY HISTORY:      SOCIAL Hx  Social History:  Former smoker (smoked for 22 yrs, quit 14 yrs ago). Lives alone and has HHA. Ambulates with cane at baseline. (16 Jan 2025 17:27)      VITALS  Vital Signs Last 24 Hrs  T(C): 37 (26 Jan 2025 08:00), Max: 37.5 (25 Jan 2025 20:00)  T(F): 98.6 (26 Jan 2025 08:00), Max: 99.5 (25 Jan 2025 20:00)  HR: 90 (26 Jan 2025 11:31) (80 - 92)  BP: 137/72 (26 Jan 2025 09:00) (99/56 - 154/80)  BP(mean): 98 (26 Jan 2025 09:00) (71 - 110)  RR: 25 (26 Jan 2025 09:00) (22 - 27)  SpO2: 100% (26 Jan 2025 11:31) (98% - 100%)    Parameters below as of 26 Jan 2025 11:31  Patient On (Oxygen Delivery Method): ventilator        PHYSICAL EXAM  Gen: Lying in bed, intubated and sedated   Spine: Skin intact, incision is well-healed. No palpable deformity or fluid collections. Cannot test sensorimotor exam as patient is fully sedated.      LABS                        8.8    9.04  )-----------( 166      ( 26 Jan 2025 00:16 )             26.6     01-26    141  |  99  |  91[H]  ----------------------------<  126[H]  3.6   |  20[L]  |  4.70[H]    Ca    9.2      26 Jan 2025 00:17  Phos  8.4     01-26  Mg     2.6     01-26    TPro  6.7  /  Alb  3.2[L]  /  TBili  0.5  /  DBili  x   /  AST  99[H]  /  ALT  103[H]  /  AlkPhos  157[H]  01-26    PT/INR - ( 26 Jan 2025 00:17 )   PT: 10.9 sec;   INR: 0.95 ratio         PTT - ( 26 Jan 2025 00:17 )  PTT:28.4 sec    IMAGING  MRI L spine demonstrates thoracolumbar fusion construct. Due to metal artifact and imaging modality it is difficult to assess the integrity of the hardware however a flexion deformity without associated bony or cord edema is seen at T11-T12. There is moderate canal stenosis at this level however no significant canal stenosis noted (personal read), per rads there may be hardware loosening at L1.    ASSESSMENT & PLAN  69yMale w/ previous thoracolumbar fusion (unknown etiology, DOS), recent fall 2/2 seizure activity who is now intubated/sedated for seizures. Pt has testicular cancer and was seen to have a new brain met which prompted MR L spine which showed c/f L1 hardware failure as well as T11-T12 flexion deformity.     - Please obtain CT T/L spine for further characterization of hardware and deformity. HPI  69yMale intubated/sedated in MICU for seizures and associated fall, PSH of spinal fusion (history and etiology unknown), testicular cancer with new brain mets who was seen on MRI lumbar spine to have concern for hardware failure. Per MICU resident and neurology consultation note, patient was without focal motor deficits at presentation however unsure if complaining of back pain.       ROS  Negative unless otherwise specified in HPI.    PAST MEDICAL & SURGICAL Hx  PAST MEDICAL & SURGICAL HISTORY:  Hypertension, unspecified type      Other hyperlipidemia      History of epilepsy      Paranoid schizophrenia      Obstructive sleep apnea      Testicular cancer      H/O Spinal surgery          MEDICATIONS  Home Medications:  atorvastatin 20 mg oral tablet: 1 tab(s) orally once a day (16 Jan 2025 17:20)  cholecalciferol 25 mcg (1000 intl units) oral tablet: 1 tab(s) orally once a day (16 Jan 2025 17:21)  Claritin 10 mg oral tablet: 1 tab(s) orally once a day (in the afternoon) (16 Jan 2025 17:11)  empagliflozin 10 mg oral tablet: 1 tab(s) orally once a day (16 Jan 2025 17:21)  Keppra 1000 mg oral tablet: 1 tab(s) orally 2 times a day (16 Jan 2025 17:20)  Latuda 40 mg oral tablet: 1 tab(s) orally once a day (at bedtime) (16 Jan 2025 17:20)  Lexapro 10 mg oral tablet: 1 tab(s) orally once a day +5mg tablet; TOTAL: 15mg (16 Jan 2025 17:20)  Lexapro 5 mg oral tablet: 1 tab(s) orally once a day +10mg; TOTA: 15mg (16 Jan 2025 17:20)  lisinopril 5 mg oral tablet: 1 tab(s) orally once a day (16 Jan 2025 17:21)  Neurontin 300 mg oral capsule: 1 cap(s) orally 2 times a day (16 Jan 2025 17:20)      ALLERGIES  latex (Rash)  penicillin (Hives)  seasonal allergies (Unknown)      FAMILY Hx  FAMILY HISTORY:      SOCIAL Hx  Social History:  Former smoker (smoked for 22 yrs, quit 14 yrs ago). Lives alone and has HHA. Ambulates with cane at baseline. (16 Jan 2025 17:27)      VITALS  Vital Signs Last 24 Hrs  T(C): 37 (26 Jan 2025 08:00), Max: 37.5 (25 Jan 2025 20:00)  T(F): 98.6 (26 Jan 2025 08:00), Max: 99.5 (25 Jan 2025 20:00)  HR: 90 (26 Jan 2025 11:31) (80 - 92)  BP: 137/72 (26 Jan 2025 09:00) (99/56 - 154/80)  BP(mean): 98 (26 Jan 2025 09:00) (71 - 110)  RR: 25 (26 Jan 2025 09:00) (22 - 27)  SpO2: 100% (26 Jan 2025 11:31) (98% - 100%)    Parameters below as of 26 Jan 2025 11:31  Patient On (Oxygen Delivery Method): ventilator        PHYSICAL EXAM  Gen: Lying in bed, intubated and sedated   Spine: Skin intact, incision is well-healed. No palpable deformity or fluid collections. Cannot test sensorimotor exam as patient is fully sedated.      LABS                        8.8    9.04  )-----------( 166      ( 26 Jan 2025 00:16 )             26.6     01-26    141  |  99  |  91[H]  ----------------------------<  126[H]  3.6   |  20[L]  |  4.70[H]    Ca    9.2      26 Jan 2025 00:17  Phos  8.4     01-26  Mg     2.6     01-26    TPro  6.7  /  Alb  3.2[L]  /  TBili  0.5  /  DBili  x   /  AST  99[H]  /  ALT  103[H]  /  AlkPhos  157[H]  01-26    PT/INR - ( 26 Jan 2025 00:17 )   PT: 10.9 sec;   INR: 0.95 ratio         PTT - ( 26 Jan 2025 00:17 )  PTT:28.4 sec    IMAGING  MRI L spine demonstrates thoracolumbar fusion construct. Due to metal artifact and imaging modality it is difficult to assess the integrity of the hardware however a flexion deformity without associated bony or cord edema is seen at T11-T12. There is moderate canal stenosis at this level however no significant canal stenosis noted (personal read), per rads there may be hardware loosening at L1.    ASSESSMENT & PLAN  69yMale w/ previous thoracolumbar fusion (unknown etiology, DOS), recent fall 2/2 seizure activity who is now intubated/sedated for seizures. Pt has testicular cancer and was seen to have a new brain met which prompted MR L spine which showed c/f L1 hardware failure as well as T11-T12 flexion deformity.     - Please obtain CT T/L spine for further characterization of hardware and deformity.          ****addendum****  CT scan reviewed. 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 Given construct type likely is former. No acute pathology.    - No intervention planned from spine perspective. Ortho to sign off.

## 2025-01-26 NOTE — PROGRESS NOTE ADULT - SUBJECTIVE AND OBJECTIVE BOX
Date of Service: 01-26-25 @ 16:56    Patient is a 69y old  Male who presents with a chief complaint of seizure, flu, elevated trop (26 Jan 2025 12:36)      Any change in ROS: intubated and sedate d:   on full vent support :     MEDICATIONS  (STANDING):  albuterol/ipratropium for Nebulization 3 milliLiter(s) Nebulizer every 6 hours  atorvastatin 20 milliGRAM(s) Oral at bedtime  bacitracin   Ointment 1 Application(s) Topical two times a day  chlorhexidine 0.12% Liquid 15 milliLiter(s) Oral Mucosa every 12 hours  chlorhexidine 2% Cloths 1 Application(s) Topical <User Schedule>  chlorhexidine 2% Cloths 1 Application(s) Topical <User Schedule>  escitalopram 15 milliGRAM(s) Oral daily  gabapentin Solution 300 milliGRAM(s) Oral two times a day  heparin   Injectable 7500 Unit(s) SubCutaneous every 12 hours  influenza  Vaccine (HIGH DOSE) 0.5 milliLiter(s) IntraMuscular once  lacosamide IVPB 100 milliGRAM(s) IV Intermittent every 12 hours  lamoTRIgine 100 milliGRAM(s) Oral two times a day  levETIRAcetam  IVPB 1500 milliGRAM(s) IV Intermittent two times a day  lidocaine   4% Patch 1 Patch Transdermal daily  lurasidone 40 milliGRAM(s) Oral daily  pantoprazole  Injectable 40 milliGRAM(s) IV Push daily  piperacillin/tazobactam IVPB.. 3.375 Gram(s) IV Intermittent every 12 hours  polyethylene glycol 3350 17 Gram(s) Oral daily  propofol Infusion 25 MICROgram(s)/kG/Min (15.5 mL/Hr) IV Continuous <Continuous>  senna Syrup 10 milliLiter(s) Oral at bedtime  sevelamer carbonate Powder 800 milliGRAM(s) Oral every 8 hours  sodium chloride 3%  Inhalation 4 milliLiter(s) Inhalation every 12 hours    MEDICATIONS  (PRN):  fentaNYL    Injectable 25 MICROGram(s) IV Push every 4 hours PRN Moderate Pain (4 - 6)  nystatin Powder 1 Application(s) Topical three times a day PRN fungal infection you may see and want to treat    Vital Signs Last 24 Hrs  T(C): 37 (26 Jan 2025 08:00), Max: 37.5 (25 Jan 2025 20:00)  T(F): 98.6 (26 Jan 2025 08:00), Max: 99.5 (25 Jan 2025 20:00)  HR: 84 (26 Jan 2025 14:15) (80 - 92)  BP: 137/72 (26 Jan 2025 09:00) (99/56 - 154/80)  BP(mean): 98 (26 Jan 2025 09:00) (71 - 110)  RR: 25 (26 Jan 2025 09:00) (22 - 27)  SpO2: 100% (26 Jan 2025 14:15) (98% - 100%)    Parameters below as of 26 Jan 2025 11:31  Patient On (Oxygen Delivery Method): ventilator      Mode: AC/ CMV (Assist Control/ Continuous Mandatory Ventilation)  RR (machine): 24  TV (machine): 450  FiO2: 30  PEEP: 5  ITime: 1  MAP: 11  PIP: 22    I&O's Summary    25 Jan 2025 07:01  -  26 Jan 2025 07:00  --------------------------------------------------------  IN: 1343.4 mL / OUT: 2695 mL / NET: -1351.6 mL    26 Jan 2025 07:01  -  26 Jan 2025 16:56  --------------------------------------------------------  IN: 18.6 mL / OUT: 350 mL / NET: -331.4 mL          Physical Exam:   GENERAL: NAD, well-groomed, well-developed  HEENT: BREE/   Atraumatic, Normocephalic  ENMT: No tonsillar erythema, exudates, or enlargement; Moist mucous membranes, Good dentition, No lesions  NECK: Supple, No JVD, Normal thyroid  CHEST/LUNG: Clear to auscultaion-  CVS: Regular rate and rhythm; No murmurs, rubs, or gallops  GI: : Soft, Nontender, Nondistended; Bowel sounds present  NERVOUS SYSTEM:  intubated and sedate d  EXTREMITIES:  - edema  LYMPH: No lymphadenopathy noted  SKIN: No rashes or lesions  ENDOCRINOLOGY: No Thyromegaly  PSYCH: calm     Labs:  ABG - ( 26 Jan 2025 00:16 )  pH, Arterial: 7.38  pH, Blood: x     /  pCO2: 36    /  pO2: 128   / HCO3: 21    / Base Excess: -3.3  /  SaO2: 98.1            100                            8.8    9.04  )-----------( 166      ( 26 Jan 2025 00:16 )             26.6                         10.6   12.77 )-----------( 162      ( 25 Jan 2025 01:18 )             33.1                         11.2   16.33 )-----------( 109      ( 24 Jan 2025 00:03 )             34.9                         12.0   13.21 )-----------( 135      ( 23 Jan 2025 15:10 )             37.8                         12.4   11.18 )-----------( 123      ( 23 Jan 2025 07:38 )             38.4                         10.3   6.42  )-----------( 106      ( 23 Jan 2025 00:39 )             33.1     01-26    142  |  100  |  97[H]  ----------------------------<  130[H]  3.8   |  19[L]  |  4.66[H]  01-26    141  |  99  |  91[H]  ----------------------------<  126[H]  3.6   |  20[L]  |  4.70[H]  01-25    139  |  99  |  82[H]  ----------------------------<  110[H]  3.5   |  18[L]  |  4.76[H]  01-25    139  |  97  |  81[H]  ----------------------------<  107[H]  3.7   |  18[L]  |  4.70[H]  01-25    139  |  98  |  74[H]  ----------------------------<  132[H]  4.0   |  17[L]  |  4.31[H]  01-24    136  |  100  |  64[H]  ----------------------------<  141[H]  3.9   |  18[L]  |  3.92[H]  01-24    140  |  102  |  62[H]  ----------------------------<  138[H]  4.2   |  18[L]  |  3.61[H]  01-24    138  |  105  |  50[H]  ----------------------------<  173[H]  4.8   |  17[L]  |  2.95[H]  01-23    139  |  106  |  45[H]  ----------------------------<  143[H]  5.2   |  17[L]  |  2.69[H]  01-23    138  |  104  |  38[H]  ----------------------------<  157[H]  4.8   |  19[L]  |  2.09[H]    Ca    9.5      26 Jan 2025 12:10  Ca    9.2      26 Jan 2025 00:17  Ca    9.3      25 Jan 2025 17:46  Ca    8.9      25 Jan 2025 11:31  Ca    9.3      25 Jan 2025 01:18  Phos  9.3     01-26  Phos  8.4     01-26  Phos  8.1     01-25  Phos  7.9     01-25  Phos  7.8     01-25  Mg     2.9     01-26  Mg     2.6     01-26  Mg     2.6     01-25  Mg     2.6     01-25  Mg     2.5     01-25    TPro  7.0  /  Alb  3.2[L]  /  TBili  0.6  /  DBili  x   /  AST  83[H]  /  ALT  96[H]  /  AlkPhos  154[H]  01-26  TPro  6.7  /  Alb  3.2[L]  /  TBili  0.5  /  DBili  x   /  AST  99[H]  /  ALT  103[H]  /  AlkPhos  157[H]  01-26  TPro  6.5  /  Alb  3.2[L]  /  TBili  0.4  /  DBili  x   /  AST  91[H]  /  ALT  102[H]  /  AlkPhos  143[H]  01-25  TPro  6.2  /  Alb  2.9[L]  /  TBili  0.5  /  DBili  x   /  AST  103[H]  /  ALT  105[H]  /  AlkPhos  141[H]  01-25  TPro  6.6  /  Alb  3.1[L]  /  TBili  0.6  /  DBili  x   /  AST  86[H]  /  ALT  107[H]  /  AlkPhos  140[H]  01-25  TPro  6.5  /  Alb  3.0[L]  /  TBili  0.6  /  DBili  x   /  AST  78[H]  /  ALT  111[H]  /  AlkPhos  132[H]  01-24    CAPILLARY BLOOD GLUCOSE      POCT Blood Glucose.: 129 mg/dL (26 Jan 2025 12:06)  POCT Blood Glucose.: 141 mg/dL (26 Jan 2025 04:58)      LIVER FUNCTIONS - ( 26 Jan 2025 12:10 )  Alb: 3.2 g/dL / Pro: 7.0 g/dL / ALK PHOS: 154 U/L / ALT: 96 U/L / AST: 83 U/L / GGT: x           PT/INR - ( 26 Jan 2025 00:17 )   PT: 10.9 sec;   INR: 0.95 ratio         PTT - ( 26 Jan 2025 00:17 )  PTT:28.4 sec  Urinalysis Basic - ( 26 Jan 2025 12:10 )    Color: x / Appearance: x / SG: x / pH: x  Gluc: 130 mg/dL / Ketone: x  / Bili: x / Urobili: x   Blood: x / Protein: x / Nitrite: x   Leuk Esterase: x / RBC: x / WBC x   Sq Epi: x / Non Sq Epi: x / Bacteria: x      Procalcitonin: 4.61 ng/mL (01-23 @ 15:10)  Procalcitonin: 0.86 ng/mL (01-23 @ 07:38)  Lactate, Blood: 0.9 mmol/L (01-23 @ 00:39)        RECENT CULTURES:  01-25 @ 07:21 .Sputum Sputum       Numerous polymorphonuclear leukocytes per low power field  Rare Squamous epithelial cells per low power field  No organisms seen           No growth to date.    01-24 @ 17:16 Catheterized Catheterized       rad< from: CT Lumbar Spine No Cont (01.26.25 @ 13:53) >    1. Multifocal lung abnormalities, with by basilar atelectasis or   consolidation and patchy airspace opacities in the right upper lobe.    2. No acute abnormality of the spine. Stable appearance of post surgical   changes and hardware.    --- End of Report ---           ALCIDES FAM MD; Resident Radiologist  This document has been electronically signed.  OSCAR LINTON MD; Attending Radiologist  This document has been electronically signed. Jan 26 2025  3:07PM    < end of copied text >  < from: Xray Chest 1 View- PORTABLE-Urgent (Xray Chest 1 View- PORTABLE-Urgent .) (01.25.25 @ 15:46) >    ACC: [id number] EXAM:  XR CHEST PORTABLE URGENT 1V   ORDERED BY: TICO WATSON     PROCEDURE DATE:  01/25/2025          INTERPRETATION:  HISTORY: Admitting Dxs: R55 FALL;  fluid status;  TECHNIQUE: Portable frontal view of the chest, 1 view.  COMPARISON: 1/23/2025.  FINDINGS/  IMPRESSION:  There is a Port-a-Cath in the right chest with the catheter tip in the   SVC right atrial junction. The nasogastric tube courses below the left   hemidiaphragm, tip off edge of film. The endotracheal tube tip is   obscured by the hardware in the spine. HEART: Enlarged.  LUNGS: Bibasilar atelectasis. Mild interstitial edema.. Improved aeration   in the right upper lobe  BONES: Hardware in the thoracolumbar spine    --- End of Report ---            REHANA BOWDEN; Attending Interventional Radiologist  This document has been electronically signed. Jan 26 2025 10:19AM    < end of copied text >           No growth    01-23 @ 15:14 .Blood BLOOD                No growth at 48 Hours    01-23 @ 12:21 .Blood BLOOD                No growth at 48 Hours    01-23 @ 00:15 .Blood BLOOD                No growth at 72 Hours    01-23 @ 00:10 .Blood BLOOD                No growth at 72 Hours    01-20 @ 01:52 .Blood BLOOD       ct< from: CT Chest No Cont (01.17.25 @ 20:09) >  ontrast: NONE  Oral Contrast: NONE    PROCEDURE:  CT scan of the chest was obtained without intravenous contrast.    FINDINGS:    AIRWAYS/LUNGS/PLEURA: Marked anterior bowing of the posterior tracheal   membrane, suggestive of tracheomalacia. Respiratory motion artifact   limits evaluation of the lung parenchyma. Unchanged 4 mm right   perifissural nodule (3:52). Scattered areas of subsegmental atelectasis   including at the lung apices. Trace right pleural effusion.    MEDIASTINUM AND HA: No lymphadenopathy.    VESSELS: Aortic calcifications. Coronary artery calcifications.    HEART: Cardiomegaly. No pericardial effusion.    VISUALIZED UPPER ABDOMEN: Bilateral renal cysts.    CHEST WALL AND BONES: Partially imaged thoracolumbar posterior fusion.   Stable widening of the posterior T10-T11 disc space with abrupt kyphotic   angulation. Right chest wall port with the tip in the right atrium.    IMPRESSION:    Anterior bowing of the posterior tracheal wall, suggestive of   tracheomalacia.    Trace right pleural effusion.    < end of copied text >  < from: CT Thoracic Spine No Cont (01.26.25 @ 13:52) >  Postsurgical changes of posterior cemented fusion from T5 to L2. 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 This abnormality creates a focal pronounced   kyphosis. No hardware fracture is appreciated. No acute fracture is seen.   Mild compression deformity of the T5 vertebral body appears stable from   prior. Sacrum is intact. Urinary catheter noted. Orogastric tube noted.   Endotracheal tube noted. Atrophy of the paraspinal muscles. Right lower   lobeconsolidation.    < end of copied text >  < from: CT Lumbar Spine No Cont (01.26.25 @ 13:53) >      < end of copied text >           No growth at 5 days    01-19 @ 19:28 .Blood BLOOD                No growth at 5 days          RESPIRATORY CULTURES:          Studies  Chest X-RAY  CT SCAN Chest   Venous Dopplers: LE:   CT Abdomen  Others

## 2025-01-26 NOTE — PROGRESS NOTE ADULT - ASSESSMENT
69y old Male with history of Seizure disorder, VAZQUEZ, HTN, Stage III CKD, Thrombocytopenia, Testicular cancer admitted for fall and syncope complicated by grand mal seizures requiring intubation. MRI brain concerning for metastatic lesions.     1. Thyroid nodule  - Images reviewed, nodule is hypoechoic measuring 1.6 cm, mostly smooth borders, +echogenic foci  - Nodule should be biopsied and can be done inpatient if the patient becomes more clinically stable vs outpatient  - TSH normal, low TT3 expected in critically ill patients    2. Metastatic brain lesions  - low likelihood that it's related to the thyroid nodule as brain metastases is extremely rare in thyroid cancer  - oncology following    Obed Anguiano MD  [facility] Division of Endocrinology    [address] [place] [address]    T [phone]  F [phone]

## 2025-01-27 ENCOUNTER — APPOINTMENT (OUTPATIENT)
Dept: PULMONOLOGY | Facility: CLINIC | Age: 70
End: 2025-01-27

## 2025-01-27 DIAGNOSIS — R53.2 FUNCTIONAL QUADRIPLEGIA: ICD-10-CM

## 2025-01-27 DIAGNOSIS — Z71.89 OTHER SPECIFIED COUNSELING: ICD-10-CM

## 2025-01-27 LAB
-  AZTREONAM: SIGNIFICANT CHANGE UP
-  CEFEPIME: SIGNIFICANT CHANGE UP
-  CEFTAZIDIME: SIGNIFICANT CHANGE UP
-  CIPROFLOXACIN: SIGNIFICANT CHANGE UP
-  IMIPENEM: SIGNIFICANT CHANGE UP
-  LEVOFLOXACIN: SIGNIFICANT CHANGE UP
-  MEROPENEM: SIGNIFICANT CHANGE UP
-  PIPERACILLIN/TAZOBACTAM: SIGNIFICANT CHANGE UP
ALBUMIN SERPL ELPH-MCNC: 3.3 G/DL — SIGNIFICANT CHANGE UP (ref 3.3–5)
ALP SERPL-CCNC: 155 U/L — HIGH (ref 40–120)
ALT FLD-CCNC: 92 U/L — HIGH (ref 10–45)
ANION GAP SERPL CALC-SCNC: 25 MMOL/L — HIGH (ref 5–17)
APTT BLD: 28.3 SEC — SIGNIFICANT CHANGE UP (ref 24.5–35.6)
AST SERPL-CCNC: 87 U/L — HIGH (ref 10–40)
BASOPHILS # BLD AUTO: 0 K/UL — SIGNIFICANT CHANGE UP (ref 0–0.2)
BASOPHILS NFR BLD AUTO: 0 % — SIGNIFICANT CHANGE UP (ref 0–2)
BILIRUB SERPL-MCNC: 0.5 MG/DL — SIGNIFICANT CHANGE UP (ref 0.2–1.2)
BUN SERPL-MCNC: 92 MG/DL — HIGH (ref 7–23)
CALCIUM SERPL-MCNC: 9.5 MG/DL — SIGNIFICANT CHANGE UP (ref 8.4–10.5)
CHLORIDE SERPL-SCNC: 98 MMOL/L — SIGNIFICANT CHANGE UP (ref 96–108)
CO2 SERPL-SCNC: 20 MMOL/L — LOW (ref 22–31)
CREAT SERPL-MCNC: 4.68 MG/DL — HIGH (ref 0.5–1.3)
CULTURE RESULTS: ABNORMAL
EGFR: 13 ML/MIN/1.73M2 — LOW
EOSINOPHIL # BLD AUTO: 0.06 K/UL — SIGNIFICANT CHANGE UP (ref 0–0.5)
EOSINOPHIL NFR BLD AUTO: 0.9 % — SIGNIFICANT CHANGE UP (ref 0–6)
GAS PNL BLDA: SIGNIFICANT CHANGE UP
GIANT PLATELETS BLD QL SMEAR: PRESENT — SIGNIFICANT CHANGE UP
GLUCOSE BLDC GLUCOMTR-MCNC: 121 MG/DL — HIGH (ref 70–99)
GLUCOSE BLDC GLUCOMTR-MCNC: 141 MG/DL — HIGH (ref 70–99)
GLUCOSE BLDC GLUCOMTR-MCNC: 152 MG/DL — HIGH (ref 70–99)
GLUCOSE SERPL-MCNC: 139 MG/DL — HIGH (ref 70–99)
HCT VFR BLD CALC: 30.8 % — LOW (ref 39–50)
HGB BLD-MCNC: 10 G/DL — LOW (ref 13–17)
INR BLD: 0.89 RATIO — SIGNIFICANT CHANGE UP (ref 0.85–1.16)
LYMPHOCYTES # BLD AUTO: 0.61 K/UL — LOW (ref 1–3.3)
LYMPHOCYTES # BLD AUTO: 8.7 % — LOW (ref 13–44)
MAGNESIUM SERPL-MCNC: 3 MG/DL — HIGH (ref 1.6–2.6)
MANUAL SMEAR VERIFICATION: SIGNIFICANT CHANGE UP
MCHC RBC-ENTMCNC: 30.9 PG — SIGNIFICANT CHANGE UP (ref 27–34)
MCHC RBC-ENTMCNC: 32.5 G/DL — SIGNIFICANT CHANGE UP (ref 32–36)
MCV RBC AUTO: 95.1 FL — SIGNIFICANT CHANGE UP (ref 80–100)
METHOD TYPE: SIGNIFICANT CHANGE UP
MONOCYTES # BLD AUTO: 0.31 K/UL — SIGNIFICANT CHANGE UP (ref 0–0.9)
MONOCYTES NFR BLD AUTO: 4.4 % — SIGNIFICANT CHANGE UP (ref 2–14)
NEUTROPHILS # BLD AUTO: 5.94 K/UL — SIGNIFICANT CHANGE UP (ref 1.8–7.4)
NEUTROPHILS NFR BLD AUTO: 85.1 % — HIGH (ref 43–77)
ORGANISM # SPEC MICROSCOPIC CNT: ABNORMAL
ORGANISM # SPEC MICROSCOPIC CNT: ABNORMAL
PHOSPHATE SERPL-MCNC: 8.9 MG/DL — HIGH (ref 2.5–4.5)
PLAT MORPH BLD: NORMAL — SIGNIFICANT CHANGE UP
PLATELET # BLD AUTO: 184 K/UL — SIGNIFICANT CHANGE UP (ref 150–400)
PLATELET COUNT - ESTIMATE: ABNORMAL
POTASSIUM SERPL-MCNC: 3.6 MMOL/L — SIGNIFICANT CHANGE UP (ref 3.5–5.3)
POTASSIUM SERPL-SCNC: 3.6 MMOL/L — SIGNIFICANT CHANGE UP (ref 3.5–5.3)
PROT SERPL-MCNC: 6.9 G/DL — SIGNIFICANT CHANGE UP (ref 6–8.3)
PROTHROM AB SERPL-ACNC: 10.2 SEC — SIGNIFICANT CHANGE UP (ref 9.9–13.4)
RBC # BLD: 3.24 M/UL — LOW (ref 4.2–5.8)
RBC # FLD: 12.9 % — SIGNIFICANT CHANGE UP (ref 10.3–14.5)
RBC BLD AUTO: NORMAL — SIGNIFICANT CHANGE UP
SODIUM SERPL-SCNC: 143 MMOL/L — SIGNIFICANT CHANGE UP (ref 135–145)
SPECIMEN SOURCE: SIGNIFICANT CHANGE UP
VARIANT LYMPHS # BLD: 0.9 % — SIGNIFICANT CHANGE UP (ref 0–6)
VARIANT LYMPHS NFR BLD MANUAL: 0.9 % — SIGNIFICANT CHANGE UP (ref 0–6)
WBC # BLD: 6.98 K/UL — SIGNIFICANT CHANGE UP (ref 3.8–10.5)
WBC # FLD AUTO: 6.98 K/UL — SIGNIFICANT CHANGE UP (ref 3.8–10.5)

## 2025-01-27 PROCEDURE — 99223 1ST HOSP IP/OBS HIGH 75: CPT

## 2025-01-27 PROCEDURE — 99291 CRITICAL CARE FIRST HOUR: CPT

## 2025-01-27 RX ORDER — HEPARIN SODIUM,PORCINE 10000/ML
7500 VIAL (ML) INJECTION EVERY 8 HOURS
Refills: 0 | Status: DISCONTINUED | OUTPATIENT
Start: 2025-01-27 | End: 2025-02-06

## 2025-01-27 RX ADMIN — SEVELAMER CARBONATE 800 MILLIGRAM(S): 800 TABLET, FILM COATED ORAL at 14:46

## 2025-01-27 RX ADMIN — IPRATROPIUM BROMIDE AND ALBUTEROL SULFATE 3 MILLILITER(S): .5; 2.5 SOLUTION RESPIRATORY (INHALATION) at 11:30

## 2025-01-27 RX ADMIN — LIDOCAINE HYDROCHLORIDE 1 PATCH: 30 CREAM TOPICAL at 07:03

## 2025-01-27 RX ADMIN — ANTISEPTIC SURGICAL SCRUB 1 APPLICATION(S): 0.04 SOLUTION TOPICAL at 05:51

## 2025-01-27 RX ADMIN — LACOSAMIDE 120 MILLIGRAM(S): 200 TABLET, FILM COATED ORAL at 17:12

## 2025-01-27 RX ADMIN — LIDOCAINE HYDROCHLORIDE 1 PATCH: 30 CREAM TOPICAL at 05:10

## 2025-01-27 RX ADMIN — Medication 1 APPLICATION(S): at 05:03

## 2025-01-27 RX ADMIN — SEVELAMER CARBONATE 800 MILLIGRAM(S): 800 TABLET, FILM COATED ORAL at 05:11

## 2025-01-27 RX ADMIN — LEVETIRACETAM 400 MILLIGRAM(S): 750 TABLET, FILM COATED ORAL at 18:07

## 2025-01-27 RX ADMIN — LAMOTRIGINE 100 MILLIGRAM(S): 100 TABLET ORAL at 05:10

## 2025-01-27 RX ADMIN — Medication 7500 UNIT(S): at 23:06

## 2025-01-27 RX ADMIN — ATORVASTATIN CALCIUM 20 MILLIGRAM(S): 80 TABLET, FILM COATED ORAL at 23:06

## 2025-01-27 RX ADMIN — IPRATROPIUM BROMIDE AND ALBUTEROL SULFATE 3 MILLILITER(S): .5; 2.5 SOLUTION RESPIRATORY (INHALATION) at 05:16

## 2025-01-27 RX ADMIN — IPRATROPIUM BROMIDE AND ALBUTEROL SULFATE 3 MILLILITER(S): .5; 2.5 SOLUTION RESPIRATORY (INHALATION) at 17:08

## 2025-01-27 RX ADMIN — Medication 1 APPLICATION(S): at 17:10

## 2025-01-27 RX ADMIN — LAMOTRIGINE 100 MILLIGRAM(S): 100 TABLET ORAL at 17:11

## 2025-01-27 RX ADMIN — LIDOCAINE HYDROCHLORIDE 1 PATCH: 30 CREAM TOPICAL at 16:30

## 2025-01-27 RX ADMIN — PIPERACILLIN SODIUM AND TAZOBACTAM SODIUM 25 GRAM(S): 2; 250 INJECTION, POWDER, FOR SOLUTION INTRAVENOUS at 05:11

## 2025-01-27 RX ADMIN — PANTOPRAZOLE 40 MILLIGRAM(S): 20 TABLET, DELAYED RELEASE ORAL at 11:18

## 2025-01-27 RX ADMIN — PIPERACILLIN SODIUM AND TAZOBACTAM SODIUM 25 GRAM(S): 2; 250 INJECTION, POWDER, FOR SOLUTION INTRAVENOUS at 17:10

## 2025-01-27 RX ADMIN — Medication 4 MILLILITER(S): at 05:16

## 2025-01-27 RX ADMIN — GABAPENTIN 300 MILLIGRAM(S): 800 TABLET ORAL at 17:11

## 2025-01-27 RX ADMIN — ESCITALOPRAM 15 MILLIGRAM(S): 10 TABLET, FILM COATED ORAL at 11:18

## 2025-01-27 RX ADMIN — IPRATROPIUM BROMIDE AND ALBUTEROL SULFATE 3 MILLILITER(S): .5; 2.5 SOLUTION RESPIRATORY (INHALATION) at 23:33

## 2025-01-27 RX ADMIN — LACOSAMIDE 120 MILLIGRAM(S): 200 TABLET, FILM COATED ORAL at 05:03

## 2025-01-27 RX ADMIN — ANTISEPTIC SURGICAL SCRUB 15 MILLILITER(S): 0.04 SOLUTION TOPICAL at 17:11

## 2025-01-27 RX ADMIN — SEVELAMER CARBONATE 800 MILLIGRAM(S): 800 TABLET, FILM COATED ORAL at 23:06

## 2025-01-27 RX ADMIN — GABAPENTIN 300 MILLIGRAM(S): 800 TABLET ORAL at 05:10

## 2025-01-27 RX ADMIN — ANTISEPTIC SURGICAL SCRUB 15 MILLILITER(S): 0.04 SOLUTION TOPICAL at 05:03

## 2025-01-27 RX ADMIN — Medication 7500 UNIT(S): at 05:10

## 2025-01-27 RX ADMIN — Medication 7500 UNIT(S): at 17:11

## 2025-01-27 RX ADMIN — LURASIDONE HYDROCHLORIDE 40 MILLIGRAM(S): 80 TABLET, FILM COATED ORAL at 11:18

## 2025-01-27 RX ADMIN — LEVETIRACETAM 400 MILLIGRAM(S): 750 TABLET, FILM COATED ORAL at 05:52

## 2025-01-27 NOTE — CONSULT NOTE ADULT - PROBLEM SELECTOR RECOMMENDATION 9
Intubated, management per MICU  Ongoing attempts to wean  PNA RLL CAP treated completed ceftriaxone  FLU treated with TAmiful, symptom management

## 2025-01-27 NOTE — PROGRESS NOTE ADULT - ASSESSMENT
Patient is a 69 year old male with PMH of metastatic testicular cancer (s/p L orchiectomy, on etoposide/cisplatin chemo, last dose 6/2024), epilepsy (grand-mal seizures), schizophrenia, developmental delay, HTN, HLD, VAZQUEZ, stage III CKD, severe obesity, secondary hyperparathyroidism, anemia, thrombocytopenia who presented to ED for fall, possible seizure prior. Patient reported cough for few weeks with no other respiratory symptoms.   Admitted for further work up for syncope, c/f seizure   Influenza A  1/17 s/p RRTs for grand mal seizure activity, s/p intubation and transfer to MICU  - 1/17 CT chest with anterior bowing of posterior tracheal wall suggestive of tracheomalacia, trace R pleural effusion   - MRSA PCR screen negative    - 1/18 sputum culture negative    - s/p extubation 1/19   - 1/20 Bcx negative    - s/p ceftriaxone 1/17-1/21   - s/p tamiflu 1/17-1/21    Sepsis/sirs, AHRF possible flash pulmonary edema iso uncontrolled HTN, c/f aspiration pneumonia   - 1/23 s/p RRT am for hypoxia/inc WOB, febrile last night 100.6F and now 101F this am, tachycardia, leukocytosis 11k   - repeat COVID/Flu/RSV with known influenza A-likely shedding    - reported occ dysuria, UA negative for pyuria   - 1/23 afternoon s/p RRT for inc WOB on AVAPS, s/p intubation and transfer to MICU, required pressor support  - MRSA PCR screen negative   - 1/24 afebrile overnight, WBC uptrending, weaned off pressor earlier in am, on zosyn   - 1/25 afebrile, CXR with R infiltrate, Bcx remain NGTD, WBC down  - 1/25 Scx with rare Pseudomonas aeruginosa   - MRI brain with metastatic disease  - thyroid us with nodule  - 1/26 CT spine with no acute abn of spine, stable postop changes and hardware; noted with multifocal lung abn with bibasilar atelectasis or consolidation and patchy airspace opacities in the RUL  - h/o penicillin allergy noted--tolerating zosyn     s/p cefepime 1/23  s/p zosyn 1/23    Recommendations:   Follow Scx for Pseudomonas sensitivities   Continue empiric zosyn - complete 7d course on 1/29  Monitor temps/WBC  Aspiration precautions   Continue rest of care per primary team       Greyson Mart M.D.  [facility]  Available on [employer] TEAMS - *PREFERRED*  [phone]  After 5pm on weekdays and all day on weekends - please call [phone]     Thank you for consulting us and involving us in the management of this patients case. In addition to reviewing history, imaging, documents, labs, microbiology, took into account antibiotic stewardship, local antibiogram and infection control strategies and potential transmission issues.

## 2025-01-27 NOTE — CONSULT NOTE ADULT - PROBLEM SELECTOR RECOMMENDATION 5
See goals of care
Hx of metastatic testicular cancer   -s/p L orchiectomy, on etoposide/cisplatin chemo, last dose 6/2024

## 2025-01-27 NOTE — PROGRESS NOTE ADULT - SUBJECTIVE AND OBJECTIVE BOX
[facility]  JACINTO Horton Y. Patel, S. Shah, G. Casimir  [phone]  ([phone] - weekdays after 5pm and weekends)    Name: OSCAR MILAN  Age/Gender: 69y Male  MRN: [id number]    Interval History:  Patient seen and examined this morning.   Intubated, unable to obtain ROS.   Notes reviewed  No concerning overnight events  Afebrile   Allergies: penicillin (Hives)  seasonal allergies (Unknown)      Objective:  Vitals:   T(F): 98.1 (01-27-25 @ 04:00), Max: 99.1 (01-27-25 @ 00:00)  HR: 73 (01-27-25 @ 07:00) (73 - 91)  BP: 109/63 (01-27-25 @ 07:00) (105/67 - 170/85)  RR: 24 (01-27-25 @ 07:00) (16 - 25)  SpO2: 100% (01-27-25 @ 07:00) (97% - 100%)  Physical Examination:  General: no acute distress  HEENT: normocephalic, atraumatic, ETT  Respiratory: clear to auscultation anteriorly   Cardiovascular: S1 and S2 present, normal rate   Gastrointestinal: soft, nontender, nondistended  Extremities: no LE edema, no cyanosis  Skin: no visible rash    Laboratory Studies:  CBC:                       10.0   6.98  )-----------( 184      ( 27 Jan 2025 00:32 )             30.8     WBC Trend:  6.98 01-27-25 @ 00:32  9.04 01-26-25 @ 00:16  12.77 01-25-25 @ 01:18  16.33 01-24-25 @ 00:03  13.21 01-23-25 @ 15:10  11.18 01-23-25 @ 07:38  6.42 01-23-25 @ 00:39  4.89 01-22-25 @ 07:28  6.58 01-21-25 @ 00:43    CMP: 01-27    143  |  98  |  92[H]  ----------------------------<  139[H]  3.6   |  20[L]  |  4.68[H]    Ca    9.5      27 Jan 2025 00:32  Phos  8.9     01-27  Mg     3.0     01-27    TPro  6.9  /  Alb  3.3  /  TBili  0.5  /  DBili  x   /  AST  87[H]  /  ALT  92[H]  /  AlkPhos  155[H]  01-27    Creatinine: 4.68 mg/dL (01-27-25 @ 00:32)  Creatinine: 4.66 mg/dL (01-26-25 @ 12:10)  Creatinine: 4.70 mg/dL (01-26-25 @ 00:17)  Creatinine: 4.76 mg/dL (01-25-25 @ 17:46)  Creatinine: 4.70 mg/dL (01-25-25 @ 11:31)  Creatinine: 4.31 mg/dL (01-25-25 @ 01:18)  Creatinine: 3.92 mg/dL (01-24-25 @ 17:15)  Creatinine: 3.61 mg/dL (01-24-25 @ 10:29)  Creatinine: 2.95 mg/dL (01-24-25 @ 00:03)  Creatinine: 2.69 mg/dL (01-23-25 @ 18:16)  Creatinine: 2.09 mg/dL (01-23-25 @ 07:38)  Creatinine: 2.08 mg/dL (01-23-25 @ 00:39)  Creatinine: 2.23 mg/dL (01-22-25 @ 07:26)  Creatinine: 2.53 mg/dL (01-21-25 @ 00:43)      LIVER FUNCTIONS - ( 27 Jan 2025 00:32 )  Alb: 3.3 g/dL / Pro: 6.9 g/dL / ALK PHOS: 155 U/L / ALT: 92 U/L / AST: 87 U/L / GGT: x           Urinalysis (01.23.25 @ 15:33)    pH Urine: 5.5   Glucose Qualitative, Urine: 250 mg/dL   Blood, Urine: Moderate   Color: Yellow   Urine Appearance: Clear   Bilirubin: Negative   Ketone - Urine: Trace mg/dL   Specific Gravity: 1.022   Protein, Urine: >=1000 mg/dL   Urobilinogen: 0.2 mg/dL   Nitrite: Negative   Leukocyte Esterase Concentration: Negative    Microbiology: reviewed   Culture - Sputum (collected 01-25-25 @ 07:21)  Source: .Sputum Sputum  Gram Stain (01-26-25 @ 23:12):    Numerous polymorphonuclear leukocytes per low power field    Rare Squamous epithelial cells per low power field    No organisms seen  Preliminary Report (01-26-25 @ 23:12):    Rare Pseudomonas aeruginosa    Culture - Urine (collected 01-24-25 @ 17:16)  Source: Catheterized Catheterized  Final Report (01-26-25 @ 02:35):    No growth    Culture - Blood (collected 01-23-25 @ 15:14)  Source: .Blood BLOOD  Preliminary Report (01-26-25 @ 20:00):    No growth at 72 Hours    Culture - Blood (collected 01-23-25 @ 15:14)  Source: .Blood BLOOD  Preliminary Report (01-26-25 @ 20:00):    No growth at 72 Hours    Culture - Blood (collected 01-23-25 @ 12:21)  Source: .Blood BLOOD  Preliminary Report (01-26-25 @ 17:00):    No growth at 72 Hours    Culture - Blood (collected 01-23-25 @ 12:21)  Source: .Blood BLOOD  Preliminary Report (01-26-25 @ 17:00):    No growth at 72 Hours    Culture - Blood (collected 01-23-25 @ 00:15)  Source: .Blood BLOOD  Preliminary Report (01-27-25 @ 04:00):    No growth at 4 days    Culture - Blood (collected 01-23-25 @ 00:10)  Source: .Blood BLOOD  Preliminary Report (01-27-25 @ 04:00):    No growth at 4 days    Culture - Blood (collected 01-20-25 @ 01:52)  Source: .Blood BLOOD  Final Report (01-25-25 @ 09:00):    No growth at 5 days    Culture - Blood (collected 01-19-25 @ 19:28)  Source: .Blood BLOOD  Final Report (01-25-25 @ 04:00):    No growth at 5 days    Culture - Sputum (collected 01-18-25 @ 00:36)  Source: .Sputum Sputum  Gram Stain (01-18-25 @ 06:52):    Moderate polymorphonuclear leukocytes per low power field    No Squamous epithelial cells per low power field    No organisms seen per oil power field  Final Report (01-19-25 @ 08:17):    No growth    Culture - Blood (collected 01-17-25 @ 15:10)  Source: .Blood BLOOD  Final Report (01-22-25 @ 20:00):    No growth at 5 days    Culture - Blood (collected 01-17-25 @ 14:51)  Source: .Blood BLOOD  Final Report (01-22-25 @ 20:00):    No growth at 5 days    Radiology: reviewed   < from: CT Thoracic Lumbar Spine No Cont (01.26.25 @ 13:53) >  IMPRESSION:    1. Multifocal lung abnormalities, with by basilar atelectasis or   consolidation and patchy airspace opacities in the right upper lobe.    2. No acute abnormality of the spine. Stable appearance of post surgical   changes and hardware.    --- End of Report ---    < end of copied text >    < from: Xray Chest 1 View- PORTABLE-Urgent (Xray Chest 1 View- PORTABLE-Urgent .) (01.25.25 @ 15:46) >  IMPRESSION:  There is a Port-a-Cath in the right chest with the catheter tip in the   SVC right atrial junction. The nasogastric tube courses below the left   hemidiaphragm, tip off edge of film. The endotracheal tube tip is   obscured by the hardware in the spine. HEART: Enlarged.  LUNGS: Bibasilar atelectasis. Mild interstitial edema.. Improved aeration   in the right upper lobe  BONES: Hardware in the thoracolumbar spine    --- End of Report ---    < end of copied text >    < from: MR Orbit, Face, and/or Neck w/wo IV Cont (01.25.25 @ 04:18) >  IMPRESSION:    MR brain: Mild periventricular and moderate deep and subcortical white   matter ischemia. 5.4 mm enhancing lesion in the LEFT middle cerebellar   peduncle with associated edema suspicious for metastases.    Marked LEFT   and mild RIGHT mucosal thickening within the BILATERAL mastoid air cells.    MRA NECK: No lymphadenopathy. 1.6 cm RIGHT thyroid nodule.4.2 cm RIGHT   upper lobe mass/infiltrate. Mild BILATERAL maxillary sinus mucosal   thickening and moderate RIGHT maxillary sinus secretions. Secretions are   seen within the nasopharynx.    --- End of Report ---    < end of copied text >      < from: MR Lumbar Spine No Cont (01.24.25 @ 22:40) >  IMPRESSION:    Posterior fusion extends from thoracic spine into the L1 level. The   distal fusion rods appeared to be distal detached from the L1 lamina with   free lamina hooks, recommend CT lumbar for complete evaluation. Lumbar   discs intact.    --- End of Report ---    < end of copied text >    Medications:  albuterol/ipratropium for Nebulization 3 milliLiter(s) Nebulizer every 6 hours  atorvastatin 20 milliGRAM(s) Oral at bedtime  bacitracin   Ointment 1 Application(s) Topical two times a day  chlorhexidine 0.12% Liquid 15 milliLiter(s) Oral Mucosa every 12 hours  chlorhexidine 2% Cloths 1 Application(s) Topical <User Schedule>  chlorhexidine 2% Cloths 1 Application(s) Topical <User Schedule>  escitalopram 15 milliGRAM(s) Oral daily  fentaNYL    Injectable 25 MICROGram(s) IV Push every 4 hours PRN  gabapentin Solution 300 milliGRAM(s) Oral two times a day  heparin   Injectable 7500 Unit(s) SubCutaneous every 12 hours  influenza  Vaccine (HIGH DOSE) 0.5 milliLiter(s) IntraMuscular once  lacosamide IVPB 100 milliGRAM(s) IV Intermittent every 12 hours  lamoTRIgine 100 milliGRAM(s) Oral two times a day  levETIRAcetam  IVPB 1500 milliGRAM(s) IV Intermittent two times a day  lidocaine   4% Patch 1 Patch Transdermal daily  lurasidone 40 milliGRAM(s) Oral daily  nystatin Powder 1 Application(s) Topical three times a day PRN  pantoprazole  Injectable 40 milliGRAM(s) IV Push daily  piperacillin/tazobactam IVPB.. 3.375 Gram(s) IV Intermittent every 12 hours  polyethylene glycol 3350 17 Gram(s) Oral daily  propofol Infusion 25 MICROgram(s)/kG/Min IV Continuous <Continuous>  senna Syrup 10 milliLiter(s) Oral at bedtime  sevelamer carbonate Powder 800 milliGRAM(s) Oral every 8 hours  sodium chloride 3%  Inhalation 4 milliLiter(s) Inhalation every 12 hours    Current Antimicrobials:  piperacillin/tazobactam IVPB.. 3.375 Gram(s) IV Intermittent every 12 hours    Prior/Completed Antimicrobials:  cefTRIAXone   IVPB  cefTRIAXone   IVPB  oseltamivir  piperacillin/tazobactam IVPB..  vancomycin  IVPB

## 2025-01-27 NOTE — PROGRESS NOTE ADULT - ASSESSMENT
69y old Male with history of Seizure disorder, VAZQUEZ, HTN, Stage III CKD, Thrombocytopenia, Testicular cancer admitted for fall and syncope complicated by grand mal seizures requiring intubation. MRI brain concerning for metastatic lesions.     1. Thyroid nodule  - Images reviewed, nodule is hypoechoic measuring 1.6 cm, mostly smooth borders, +echogenic foci  - Nodule should be biopsied and can be done inpatient if the patient becomes more clinically stable vs outpatient  - TSH normal, low TT3 expected in critically ill patients    2. Metastatic brain lesions  - low likelihood that it's related to the thyroid nodule as brain metastases is extremely rare in thyroid cancer  - oncology following    Will sign off at this time. Please call with questions.     Obed Anguiano MD  [facility]    [address] [address]    T [phone]  F [phone]   69y old Male with history of Seizure disorder, VAZQUEZ, HTN, Stage III CKD, Thrombocytopenia, Testicular cancer admitted for fall and syncope complicated by grand mal seizures requiring intubation. MRI brain concerning for metastatic lesions.     1. Thyroid nodule  - Images reviewed, nodule is hypoechoic measuring 1.6 cm, mostly smooth borders, +echogenic foci  - Nodule should be biopsied and can be done inpatient if the patient becomes more clinically stable vs outpatient  - TSH normal, low TT3 expected in critically ill patients  - Reviewed with patient's sister today    2. Metastatic brain lesions  - low likelihood that it's related to the thyroid nodule as brain metastases is extremely rare in thyroid cancer  - oncology following    Will sign off at this time. Please call with questions.     Obed Anguiano MD  [facility]    [address] [place] [address]    T [phone]  F [phone]

## 2025-01-27 NOTE — CONSULT NOTE ADULT - PROBLEM SELECTOR RECOMMENDATION 3
Completed chemo   Was due for PET scan per sister/surrogate Ester  Previous scans were negative
-Per neuro.

## 2025-01-27 NOTE — PROGRESS NOTE ADULT - SUBJECTIVE AND OBJECTIVE BOX
Date of Service: 01-27-25 @ 17:06    Patient is a 69y old  Male who presents with a chief complaint of seizure, flu, elevated trop (27 Jan 2025 15:53)      Any change in ROS: Seems same:  though alert and awe and is on cpap trials at this time     MEDICATIONS  (STANDING):  albuterol/ipratropium for Nebulization 3 milliLiter(s) Nebulizer every 6 hours  atorvastatin 20 milliGRAM(s) Oral at bedtime  bacitracin   Ointment 1 Application(s) Topical two times a day  chlorhexidine 0.12% Liquid 15 milliLiter(s) Oral Mucosa every 12 hours  chlorhexidine 2% Cloths 1 Application(s) Topical <User Schedule>  chlorhexidine 2% Cloths 1 Application(s) Topical <User Schedule>  escitalopram 15 milliGRAM(s) Oral daily  gabapentin Solution 300 milliGRAM(s) Oral two times a day  heparin   Injectable 7500 Unit(s) SubCutaneous every 8 hours  influenza  Vaccine (HIGH DOSE) 0.5 milliLiter(s) IntraMuscular once  lacosamide IVPB 100 milliGRAM(s) IV Intermittent every 12 hours  lamoTRIgine 100 milliGRAM(s) Oral two times a day  levETIRAcetam  IVPB 1500 milliGRAM(s) IV Intermittent two times a day  lidocaine   4% Patch 1 Patch Transdermal daily  lurasidone 40 milliGRAM(s) Oral daily  pantoprazole  Injectable 40 milliGRAM(s) IV Push daily  piperacillin/tazobactam IVPB.. 3.375 Gram(s) IV Intermittent every 12 hours  polyethylene glycol 3350 17 Gram(s) Oral daily  propofol Infusion 25 MICROgram(s)/kG/Min (15.5 mL/Hr) IV Continuous <Continuous>  senna Syrup 10 milliLiter(s) Oral at bedtime  sevelamer carbonate Powder 800 milliGRAM(s) Oral every 8 hours    MEDICATIONS  (PRN):  fentaNYL    Injectable 25 MICROGram(s) IV Push every 4 hours PRN Moderate Pain (4 - 6)  nystatin Powder 1 Application(s) Topical three times a day PRN fungal infection you may see and want to treat    Vital Signs Last 24 Hrs  T(C): 37 (27 Jan 2025 16:00), Max: 37.3 (27 Jan 2025 00:00)  T(F): 98.6 (27 Jan 2025 16:00), Max: 99.1 (27 Jan 2025 00:00)  HR: 81 (27 Jan 2025 16:00) (70 - 91)  BP: 121/66 (27 Jan 2025 16:00) (105/67 - 170/85)  BP(mean): 87 (27 Jan 2025 16:00) (80 - 120)  RR: 18 (27 Jan 2025 16:00) (14 - 24)  SpO2: 100% (27 Jan 2025 16:00) (97% - 100%)    Parameters below as of 27 Jan 2025 11:29  Patient On (Oxygen Delivery Method): ventilator      Mode: CPAP with PS  FiO2: 30  PEEP: 5  PS: 5  MAP: 8  PIP: 11    I&O's Summary    26 Jan 2025 07:01  -  27 Jan 2025 07:00  --------------------------------------------------------  IN: 1497.6 mL / OUT: 1480 mL / NET: 17.6 mL          Physical Exam:   GENERAL: NAD, well-groomed, well-developed  HEENT: BREE/   Atraumatic, Normocephalic  ENMT: No tonsillar erythema, exudates, or enlargement; Moist mucous membranes, Good dentition, No lesions  NECK: Supple, No JVD, Normal thyroid  CHEST/LUNG: Clear to auscultaion, ; No rales, rhonchi, wheezing, or rubs  CVS: Regular rate and rhythm; No murmurs, rubs, or gallops  GI: : Soft, Nontender, Nondistended; Bowel sounds present  NERVOUS SYSTEM:  Alert & awake and intubated and seems to understands simple commands:   EXTREMITIES: - edema  LYMPH: No lymphadenopathy noted  SKIN: No rashes or lesions  ENDOCRINOLOGY: No Thyromegaly  PSYCH: calm     Labs:  ABG - ( 27 Jan 2025 00:20 )  pH, Arterial: 7.42  pH, Blood: x     /  pCO2: 35    /  pO2: 126   / HCO3: 23    / Base Excess: -1.4  /  SaO2: 98.5            100                            10.0   6.98  )-----------( 184      ( 27 Jan 2025 00:32 )             30.8                         8.8    9.04  )-----------( 166      ( 26 Jan 2025 00:16 )             26.6                         10.6   12.77 )-----------( 162      ( 25 Jan 2025 01:18 )             33.1                         11.2   16.33 )-----------( 109      ( 24 Jan 2025 00:03 )             34.9     01-27    143  |  98  |  92[H]  ----------------------------<  139[H]  3.6   |  20[L]  |  4.68[H]  01-26    142  |  100  |  97[H]  ----------------------------<  130[H]  3.8   |  19[L]  |  4.66[H]  01-26    141  |  99  |  91[H]  ----------------------------<  126[H]  3.6   |  20[L]  |  4.70[H]  01-25    139  |  99  |  82[H]  ----------------------------<  110[H]  3.5   |  18[L]  |  4.76[H]  01-25    139  |  97  |  81[H]  ----------------------------<  107[H]  3.7   |  18[L]  |  4.70[H]  01-25    139  |  98  |  74[H]  ----------------------------<  132[H]  4.0   |  17[L]  |  4.31[H]  01-24    136  |  100  |  64[H]  ----------------------------<  141[H]  3.9   |  18[L]  |  3.92[H]  01-24    140  |  102  |  62[H]  ----------------------------<  138[H]  4.2   |  18[L]  |  3.61[H]  01-24    138  |  105  |  50[H]  ----------------------------<  173[H]  4.8   |  17[L]  |  2.95[H]  01-23    139  |  106  |  45[H]  ----------------------------<  143[H]  5.2   |  17[L]  |  2.69[H]    Ca    9.5      27 Jan 2025 00:32  Ca    9.5      26 Jan 2025 12:10  Ca    9.2      26 Jan 2025 00:17  Ca    9.3      25 Jan 2025 17:46  Phos  8.9     01-27  Phos  9.3     01-26  Phos  8.4     01-26  Phos  8.1     01-25  Mg     3.0     01-27  Mg     2.9     01-26  Mg     2.6     01-26  Mg     2.6     01-25    TPro  6.9  /  Alb  3.3  /  TBili  0.5  /  DBili  x   /  AST  87[H]  /  ALT  92[H]  /  AlkPhos  155[H]  01-27  TPro  7.0  /  Alb  3.2[L]  /  TBili  0.6  /  DBili  x   /  AST  83[H]  /  ALT  96[H]  /  AlkPhos  154[H]  01-26  TPro  6.7  /  Alb  3.2[L]  /  TBili  0.5  /  DBili  x   /  AST  99[H]  /  ALT  103[H]  /  AlkPhos  157[H]  01-26  TPro  6.5  /  Alb  3.2[L]  /  TBili  0.4  /  DBili  x   /  AST  91[H]  /  ALT  102[H]  /  AlkPhos  143[H]  01-25  TPro  6.2  /  Alb  2.9[L]  /  TBili  0.5  /  DBili  x   /  AST  103[H]  /  ALT  105[H]  /  AlkPhos  141[H]  01-25  TPro  6.6  /  Alb  3.1[L]  /  TBili  0.6  /  DBili  x   /  AST  86[H]  /  ALT  107[H]  /  AlkPhos  140[H]  01-25    CAPILLARY BLOOD GLUCOSE      POCT Blood Glucose.: 121 mg/dL (27 Jan 2025 11:28)  POCT Blood Glucose.: 141 mg/dL (27 Jan 2025 04:57)      LIVER FUNCTIONS - ( 27 Jan 2025 00:32 )  Alb: 3.3 g/dL / Pro: 6.9 g/dL / ALK PHOS: 155 U/L / ALT: 92 U/L / AST: 87 U/L / GGT: x           PT/INR - ( 27 Jan 2025 00:32 )   PT: 10.2 sec;   INR: 0.89 ratio         PTT - ( 27 Jan 2025 00:32 )  PTT:28.3 sec  Urinalysis Basic - ( 27 Jan 2025 00:32 )    Color: x / Appearance: x / SG: x / pH: x  Gluc: 139 mg/dL / Ketone: x  / Bili: x / Urobili: x   Blood: x / Protein: x / Nitrite: x   Leuk Esterase: x / RBC: x / WBC x   Sq Epi: x / Non Sq Epi: x / Bacteria: x            RECENT CULTURES:  01-25 @ 07:21 .Sputum Sputum       Numerous polymorphonuclear leukocytes per low power field  Rare Squamous epithelial cells per low power field  No organisms seen           Rare Pseudomonas aeruginosa    01-24 @ 17:16 Catheterized Catheterized         rad< from: Xray Chest 1 View- PORTABLE-Urgent (Xray Chest 1 View- PORTABLE-Urgent .) (01.25.25 @ 15:46) >    ACC: [id number] EXAM:  XR CHEST PORTABLE URGENT 1V   ORDERED BY: TICO WATSON     PROCEDURE DATE:  01/25/2025          INTERPRETATION:  HISTORY: Admitting Dxs: R55 FALL;  fluid status;  TECHNIQUE: Portable frontal view of the chest, 1 view.  COMPARISON: 1/23/2025.  FINDINGS/  IMPRESSION:  There is a Port-a-Cath in the right chest with the catheter tip in the   SVC right atrial junction. The nasogastric tube courses below the left   hemidiaphragm, tip off edge of film. The endotracheal tube tip is   obscured by the hardware in the spine. HEART: Enlarged.  LUNGS: Bibasilar atelectasis. Mild interstitial edema.. Improved aeration   in the right upper lobe  BONES: Hardware in the thoracolumbar spine    --- End of Report ---            REHANA BOWDEN; Attending Interventional Radiologist  This document has been electronically signed. Jan 26 2025 10:19AM    < end of copied text >         No growth    01-23 @ 15:14 .Blood BLOOD                No growth at 72 Hours    01-23 @ 12:21 .Blood BLOOD                No growth at 4 days    01-23 @ 00:15 .Blood BLOOD                No growth at 4 days    01-23 @ 00:10 .Blood BLOOD                No growth at 4 days          RESPIRATORY CULTURES:          Studies  Chest X-RAY  CT SCAN Chest   Venous Dopplers: LE:   CT Abdomen  Others

## 2025-01-27 NOTE — PROGRESS NOTE ADULT - ASSESSMENT
Mr. Gr is a 69 year old male with PMHx of seizure disorder, sleep apnea, HTN, chronic idiopathic constipation, stage III CKD, severe obesity, secondary hyperparathyroidism, anemia, thrombocytopenia, hyperlipidemia, testicular cancer s/p EP x 4 under the care of Dr. Ovalles who is admitted s/p fall in setting of possible seizure since 01/16/2025. He was intubated early in his admission due to seziures and extubated 1/21/2025 and then again intubated in setting of respiratory distress, requiring diuresis, with SHAGUFTA on CKD, in the MICU. Imaging shows possible brain metastatic disease and lung mass as well as abnormal thyroid nodule.     Former smoker, quit 14y prior, denied ETOH, drug use. Lives at home. Does not have children. Denied family history of blood disorders or malignancy.  Denied previous workplace related exposures.  Denied previous history of blood transfusions.  Denied history of thromboses.  Denied history of  requiring anticoagulation.        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  Margins were negative.  pT3Nx. CT C/A/P with few left para-aortic and left iliac chain lymph nodes largest measuring 8.1 cm left para-aortic node, bilateral subcentimeter noncalcified pulmonary nodules measuring up to 7 mm. AFP, LDH, hCG were all elevated. 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 Completed four cycles of adjuvant therapy with Cisplatin+Etoposide, with cisplatin dose reduced 50% and Etoposide 50% due to thrombocytopenia at that time. Subsequent scans 08/2024 showed resolution of disease and negative markers,         01/26/2025: continues intubated and sedated in the ICU, discussed with ICU team yesterday, pending markers, endocrinology eval noted        # Brain lesion  # Seizures  -Seizures noted, pt with hx of seizures as well  - Neurology following  - MRI brain now with 5.4 mm enhancing lesion in the LEFT middle cerebellar peduncle with associated edema suspicious for metastases  - MRI Lumbar negative for acute disease  - Small lesion without mass effect or edema, recommend to correlate per neurology if foci and locus are concordant with seizure activity  - It is rare, but nonetheless not impossible, for testicular cancer to be metastatic to the brain  - He will need another PET-CT, can be completed outpatient once stable  - Endocrinology eval for thyroid nodule  - f/u repeat markers including LDH and uric acid, AFP, BHCG quant        # Thyroid nodule  - US Thyroid 01/24/2025 with 1.6cm complex mixed solid cystic hypoechoic nodule in the lower pole of the right thyroid lobe, TIRADS 5.  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 Ovalles 12/03/2024, markers continued to be negative  - See above in regards to brain lesion  - MRI Neck shows .4.2 cm RIGHT upper lobe mass/infiltrate  - Recommend IR guided biopsy of RUL mass, currently intubated and sedate likely not possible  - Follow up as outpatient with Franki Ovalles MD     # Thrombocytopenia  - Labile  - Coags wnl, LFTs elevated  - Continue to trend  - Transfuse to maintain >10k, if actively bleeding then maintain >50k     # Acute kidney injury on CKD Stage IIIA  - Worsening    - Likely multifactorial at this point  - Nephrology consult and recs noted  - Continue to trend     # Leukocytosis, Neutrophilia  -Likely reactive  -Continue to trend     # Normocytic anemia  - Chronic, has hx of PRBC during chemo 07/2024  - Noted Anti E, Anti-K Anti-C antibodies previously  - Monitor for bleeding  - Recommend to transfuse to maintain Hb > 7        Thank you for allowing me to participate in the care Mr. Gr, please do not hesitate to call or text me if you have further questions or concerns.        Brayan Caio Mart MD  [facility] [place]   Division of Hematology/Oncology  [address] [place] [address], [address]  [place] [address]  P: [phone]  F: [phone]    Attestation:    ----Pt evalulated including face-to-face interaction in addition to chart review, reviewing treatment plan, and managing the patient’s chronic diagnoses as listed in the assessment----   Mr. Gr is a 69 year old male with PMHx of seizure disorder, sleep apnea, HTN, chronic idiopathic constipation, stage III CKD, severe obesity, secondary hyperparathyroidism, anemia, thrombocytopenia, hyperlipidemia, testicular cancer s/p EP x 4 under the care of Dr. Ovalles who is admitted s/p fall in setting of possible seizure since 01/16/2025. He was intubated early in his admission due to seziures and extubated 1/21/2025 and then again intubated in setting of respiratory distress, requiring diuresis, with SHAGUFTA on CKD, in the MICU. Imaging shows possible brain metastatic disease and lung mass as well as abnormal thyroid nodule.     Former smoker, quit 14y prior, denied ETOH, drug use. Lives at home. Does not have children. Denied family history of blood disorders or malignancy.  Denied previous workplace related exposures.  Denied previous history of blood transfusions.  Denied history of thromboses.  Denied history of  requiring anticoagulation.        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  Margins were negative.  pT3Nx. CT C/A/P with few left para-aortic and left iliac chain lymph nodes largest measuring 8.1 cm left para-aortic node, bilateral subcentimeter noncalcified pulmonary nodules measuring up to 7 mm. AFP, LDH, hCG were all elevated. 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 Completed four cycles of adjuvant therapy with Cisplatin+Etoposide, with cisplatin dose reduced 50% and Etoposide 50% due to thrombocytopenia at that time. Subsequent scans 08/2024 showed resolution of disease and negative markers,         01/26/2025: continues intubated and sedated in the ICU, discussed with ICU team yesterday, pending markers, endocrinology eval noted     01/27/2025:  AFP/BHCG normal,       # Brain lesion  # Seizures  -Seizures noted, pt with hx of seizures as well  - Neurology following  - MRI brain now with 5.4 mm enhancing lesion in the LEFT middle cerebellar peduncle with associated edema suspicious for metastases  - MRI Lumbar negative for acute disease  - Small lesion without mass effect or edema, recommend to correlate per neurology if foci and locus are concordant with seizure activity  - It is rare, but nonetheless not impossible, for testicular cancer to be metastatic to the brain  - He will need another PET-CT, can be completed outpatient once stable if concern can proceed with biopsy at that time   - Endocrinology eval for thyroid nodule  - AFP/BHCG normal,         # Thyroid nodule  - US Thyroid 01/24/2025 with 1.6cm complex mixed solid cystic hypoechoic nodule in the lower pole of the right thyroid lobe, TIRADS 5.  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 Ovalles 12/03/2024, markers continued to be negative  - See above in regards to brain lesion  - MRI Neck shows 4.2 cm RIGHT upper lobe mass/infiltrate  - Recommend IR guided biopsy of RUL mass, currently intubated and sedate likely not possible, PET-CT as outpt and then consideration after better characterization   - Follow up as outpatient with Franki Ovalles MD     # Thrombocytopenia  - Labile  - Coags wnl, LFTs elevated  - Continue to trend  - Transfuse to maintain >10k, if actively bleeding then maintain >50k     # Acute kidney injury on CKD Stage IIIA  - Worsening    - Likely multifactorial at this point  - Nephrology consult and recs noted  - Continue to trend     # Leukocytosis, Neutrophilia  -Likely reactive  -Continue to trend     # Normocytic anemia  - Chronic, has hx of PRBC during chemo 07/2024  - Noted Anti E, Anti-K Anti-C antibodies previously  - Monitor for bleeding  - Recommend to transfuse to maintain Hb > 7        Thank you for allowing me to participate in the care Mr. Gr, please do not hesitate to call or text me if you have further questions or concerns.        Brayan Mart MD  [facility] [place]   Division of Hematology/Oncology  [address], [address]  [place] [address]  P: [phone]  F: [phone]    Attestation:    ----Pt evalulated including face-to-face interaction in addition to chart review, reviewing treatment plan, and managing the patient’s chronic diagnoses as listed in the assessment----

## 2025-01-27 NOTE — CONSULT NOTE ADULT - PROBLEM SELECTOR PROBLEM 5
ED Attending Physician Note - Resident Supervision        Patient : Deena Rivera Age: 33 year old Sex: female  MRN: [id number] Encounter Date: 2020    Patient was seen in conjunction with the resident physician.  I performed an independent evaluation including history and physical with the resident physician or after the patient was seen by the resident physician. For further details about elements of the patient encounter including PMH, Social History, Family History, and ROS, see the resident note, and I agree with his/her documentation and care except as noted.     Time seen: 1200  Note - any recorded times are estimates and are as accurate as possible    HPI:  33-year-old  at 5 weeks and 1 day by LMP of  presents for vaginal bleeding.  3 days.  Using 2-4 pads per day.  Overall the bleeding is improving.  She is reporting some suprapubic and right lower quadrant intermittent cramping pain. Patient denies dysuria, urgency, frequency, hematuria, flank pain, or suprapubic pain.  No chest pain, dyspnea, cough, or palpitations.      No family history of bleeding or blood clotting disorders.    PMH:  Past Medical History:   Diagnosis Date   • Gestational diabetes        ROS: all systems reviewed and otherwise negative     PMH/Soc Hx/Fam Hx: see HPI    Physical Exam:   General:  Alert, appears comfortable.    Skin:  Normal for ethnicity.   Eye:  Normal conjunctiva.   Ears, nose, mouth and throat:  deferred 2/2 COVID pandemic  Cardiovascular:  Regular rate and rhythm, 2+ radial pulses  Respiratory:   respirations are non-labored. No conversational dyspnea  Chest wall: there is no tenderness and no deformity  Gastrointestinal: abdomen is soft, nontender, nondistended, there are no hernias palpable  Musculoskeletal:  No deformity, normal tone  Neurological:  speech is clear, no focal weakness or numbness.      Psychiatric:  Cooperative, appropriate mood & affect.       Notable Work up/Results:        
Testicular cancer
          -Labs -Rh+, hemoglobin normal, quant 2                  -Imaging -   US OB TRANSVAGINAL   Final Result   1.  No intrauterine gestation.   2.  Left ovary complex hypoechoic lesion without Doppler flow, possible complex cyst.  Although not excluded, appearance is atypical for an ovarian ectopic pregnancy.   3.  No evidence of ovary torsion.   4.  Although no other ectopic pregnancy or secondary evidence of ectopic pregnancy is visualized, it cannot be excluded given lack of visualization of intrauterine gestation.  Clinical followup, correlation with serial quantitative beta hCG values as    warranted. Ultrasound followup as clinically warranted.      Electronically Signed by: MANN ELLISON M.D.    Signed on: 2020 10:55 AM                                    MDM and ED Course:    Patient with suspected complete AB.  Her abdominal exam and reexam are quite benign.  This is not consistent with appendicitis, torsion, or other dangerous intra-abdominal or pelvic pathology.  She will call her PCP to follow-up.    (PPE worn: Gloves, surgical cap, goggles, n95)    Diagnosis:   1. Spontaneous .    Disposition:   discharge    Plan of care discussed with the patient who is in agreement and feels safe to be discharged. Symptoms that should prompt a return visit to the ED discussed with the patient who verbalized understanding. In addition, written discharge instructions have been provided to the patient. The patient was also encouraged to return for any new or concerning symptoms. All questions were answered and the patient was safely discharged.       Yordan Leblanc MD  [phone]    
ACP (advance care planning)

## 2025-01-27 NOTE — CONSULT NOTE ADULT - PROVIDER SPECIALTY LIST ADULT
Cardiology
Neurology
Orthopedics
Endocrinology
Heme/Onc
Cardiology
Pulmonology
Infectious Disease
Nephrology
Palliative Care

## 2025-01-27 NOTE — PROGRESS NOTE ADULT - ASSESSMENT
ASSESSMENT AND PLAN:  69M w/ hx of metastatic testicular cancer (s/p L orchiectomy, on etoposide/cisplatin chemo, last dose 6/2024), epilepsy (grand-mal seizures), schizophrenia, developmental delay, HTN, HLD, VAZQUEZ, stage III CKD, obesity, secondary hyperparathyroidism, anemia, thrombocytopenia, ? spinal surgery who was admitted on 1/16/25 for fall and syncope. On 1/17 patient had multiple RRTs called for grand mal seizure activity and  patient had x2 more episode and was ultimately given ativan 2mg, Vimpat load 200mg, and intubated for airway protection with MICU transfer. Extubated 1/19 - to AVAPS, post extubation with increased secretion, now better; downgraded 1/21; While on medical floors; patient developed respiratory distress; now intubated for suspected aspiration pna vs flash pulmonary edema with new fever and leukocytosis.     CHANGES/UPDATES Today      CHANGES/UPDATES Yesterday  - Oncology (darren Ovalles colleague) contacted for possible MRI showing mets to brain, lung, thyroid  - Ortho consulted, concerned about proximal junction kyphosis -> CT T and L spine non-con with metal reduction sequencing, unlikely for acute intervention if no focal deficits   - sevelamer and rasburicase for phos/uric acid  - Stopped bumex gtt with most recent POCUS w/out B lines, collapsible IVC -> trial of no hopson with frequent bladder scans (UOP good overnight and 100-150cc/hr this AM)  - Weaned vent settings: FiO2 (30% <--40%), PEEP (5<---8)  - Treating aspiration pneumonia with zosyn  - Continuing AEDs as below and if seizure, ativan    ===NEURO===  #Intubated #Sedated  - Sedation: Precedex, Propofol, wean as able   - Pain: PRN fentanyl 25mcg q4h, PRN Lidocaine patch 4%  - SATs as able    #Status Epilepticus  #Grand Mal Seizures  #?MRI with mets vs abnl lesion  - AEDs: Keppra 1.5g BID, Lamotrigine 100mg BID, Gabapentin 300mg BID, Vimpat 100mg BID  - For Seizures: Ativan  - Neuro following, appreciate recs  - Onc as below  - s/p vEEG negative for seizures  - s/p CTH negative for acute findings    #Schizophrenia  #Depression  - Escitalopram 15mg daily  - Lurasidone 40mg daily    ===CVS===  #HTN #HLD  #?Flash Pulmonary Edema  #Normal EF (62%)  - Atorvastatin 20mg qhs  - GOAL: Net negative, Normotension    #Hypotension  - Norepi prn, wean as able    ===PULM===  #Respiratory Failure s/t ?Edema vs PNA  #VAZQUEZ on CPAP at home-->?AVAPS  - Duonebs 3mL q4h  - Saline Nebs 4mL BID  - Antibiotics as below  - Cont Vent, daily SBTs as able, wean as able  - S/p Intubation 1/17 for status epilepticus  - S/p Extubation 1/23 to AVAPS    ===RENAL===  #Metabolic Acidosis  #SHAGUFTA on CKD Stage III  #Electrolytes  - Monitor BP and urine output, if concerning may need fluids  - Dao Morris following patient and is available, much appreciated  - s/p bumex 4mg gtt with good effect    ===GI===  #Diet #OG Tube  #Aspiration risk  #Bowel Regimen  - Tube feeds  - Pantoprazole 40mg daily  - Senna, Miralax    ===ID===  #?Aspiration Pneumonia  #Pneumonia RLL, CAP, treated  #FluA+, treated  - Zosyn 3.375g BID (1/23-?) empirically, if rash start cefepime  - BCx: 1/23(NGTD), 1/19 (NGTD 4d)  - Sputum Cx: 1/23 (No sufficient sputum), 1/18 (neg)  - Labs: MRSA (1/23 neg), Legionella (1/24 neg), Strep Ag (1/24 neg)  - S/p Ceftriaxone 1g daily (1/17-1/21) for CAP  - S/p Tamiflu 30mg BID (1/18-1/21) for FluA     ===ENDO===  #At risk of hypoglycemia  #Hx of secondary hyperparathyroidism  - FS q6h while NPO    ===HEME/ONC===  #Metastatic testicular cancer  #Last chemo 06/2024 (etoposide/cisplatin)  #S/p L. Orchiectomy  #New Neck Nodule: US/MRI with concern for thyroid cancer TIRADS 5  #New Lung Nodule: 4cm on MRI and additional nodule on CT 4mm   #New Brain Enhancing Lesion: 5.4mm lesion in white matter near ventricles  - Dr. Franki Ovalles ([facility]) aware, will have colleague round  - Awaiting recommendations    #DVT ppx  - Heparin SQ 7500U    ===ETHICS===  #Code Status: FULL CODE.  - Palliative consulted, following   ASSESSMENT AND PLAN:  69M w/ hx of metastatic testicular cancer (s/p L orchiectomy, on etoposide/cisplatin chemo, last dose 6/2024), epilepsy (grand-mal seizures), schizophrenia, developmental delay, HTN, HLD, VAZQUEZ, stage III CKD, obesity, secondary hyperparathyroidism, anemia, thrombocytopenia, ? spinal surgery who was admitted on 1/16/25 for fall and syncope. On 1/17 patient had multiple RRTs called for grand mal seizure activity and  patient had x2 more episode and was ultimately given ativan 2mg, Vimpat load 200mg, and intubated for airway protection with MICU transfer. Extubated 1/19 - to AVAPS, post extubation with increased secretion, now better; downgraded 1/21; While on medical floors; patient developed respiratory distress; now intubated for suspected aspiration pna vs flash pulmonary edema with new fever and leukocytosis.     CHANGES/UPDATES Today  - Heparin changed from subQ 7500u Q12H to Q8H    ===NEURO===  #Intubated #Sedated  - Sedation: Precedex, Propofol, wean as able   - Pain: PRN fentanyl 25mcg q4h, PRN Lidocaine patch 4%  - SATs as able    #Status Epilepticus  #Grand Mal Seizures  #?MRI with mets vs abnl lesion  - AEDs: Keppra 1.5g BID, Lamotrigine 100mg BID, Gabapentin 300mg BID, Vimpat 100mg BID  - For Seizures: Ativan  - Neuro following, appreciate recs  - Onc as below  - s/p vEEG negative for seizures  - s/p CTH negative for acute findings    #Schizophrenia  #Depression  - Escitalopram 15mg daily  - Lurasidone 40mg daily    ===CVS===  #HTN #HLD  #?Flash Pulmonary Edema  #Normal EF (62%)  - Atorvastatin 20mg qhs  - GOAL: Net negative, Normotension    #Hypotension  - Norepi prn, wean as able    ===PULM===  #Respiratory Failure s/t ?Edema vs PNA  #VAZQUEZ on CPAP at home-->?AVAPS  - Duonebs 3mL q4h  - Saline Nebs 4mL BID  - Antibiotics as below  - Cont Vent, daily SBTs as able, wean as able  - S/p Intubation 1/17 for status epilepticus  - S/p Extubation 1/23 to AVAPS    ===RENAL===  #Metabolic Acidosis  #SHAGUFTA on CKD Stage III  #Electrolytes  - Monitor BP and urine output, if concerning may need fluids  - Dao Morris following patient and is available, much appreciated  - s/p bumex 4mg gtt with good effect  - sevelamer and rasburicase for phos/uric acid    ===GI===  #Diet #OG Tube  #Aspiration risk  #Bowel Regimen  - Tube feeds  - Pantoprazole 40mg daily  - Senna, Miralax    ===ID===  #?Aspiration Pneumonia  #Pneumonia RLL, CAP, treated  #FluA+, treated  - Zosyn 3.375g BID (1/23-?) empirically, if rash start cefepime  - BCx: 1/23(NGTD), 1/19 (NGTD 4d)  - Sputum Cx: 1/23 (No sufficient sputum), 1/18 (neg)  - Labs: MRSA (1/23 neg), Legionella (1/24 neg), Strep Ag (1/24 neg)  - S/p Ceftriaxone 1g daily (1/17-1/21) for CAP  - S/p Tamiflu 30mg BID (1/18-1/21) for FluA     ===ENDO===  #At risk of hypoglycemia  #Hx of secondary hyperparathyroidism  - FS q6h while NPO    ===HEME/ONC===  #Metastatic testicular cancer  #Last chemo 06/2024 (etoposide/cisplatin)  #S/p L. Orchiectomy  #New Neck Nodule: US/MRI with concern for thyroid cancer TIRADS 5  #New Lung Nodule: 4cm on MRI and additional nodule on CT 4mm   #New Brain Enhancing Lesion: 5.4mm lesion in white matter near ventricles  - Dr. Franki Ovalles ([facility]) aware (contacted for possible MRI showing mets to brain, lung, thyroid), will have colleague round   - Awaiting recommendations    #DVT ppx  - Heparin SQ 7500U    ===ETHICS===  #Code Status: FULL CODE.  - Palliative consulted, following

## 2025-01-27 NOTE — PROGRESS NOTE ADULT - SUBJECTIVE AND OBJECTIVE BOX
[facility] INPATIENT PROGRESS NOTE     Interval Hx:   01-27-25: Mr. Gr was seen at bedside today.    Meds:   MEDICATIONS  (STANDING):  albuterol/ipratropium for Nebulization 3 milliLiter(s) Nebulizer every 6 hours  atorvastatin 20 milliGRAM(s) Oral at bedtime  bacitracin   Ointment 1 Application(s) Topical two times a day  chlorhexidine 0.12% Liquid 15 milliLiter(s) Oral Mucosa every 12 hours  chlorhexidine 2% Cloths 1 Application(s) Topical <User Schedule>  chlorhexidine 2% Cloths 1 Application(s) Topical <User Schedule>  escitalopram 15 milliGRAM(s) Oral daily  gabapentin Solution 300 milliGRAM(s) Oral two times a day  heparin   Injectable 7500 Unit(s) SubCutaneous every 12 hours  influenza  Vaccine (HIGH DOSE) 0.5 milliLiter(s) IntraMuscular once  lacosamide IVPB 100 milliGRAM(s) IV Intermittent every 12 hours  lamoTRIgine 100 milliGRAM(s) Oral two times a day  levETIRAcetam  IVPB 1500 milliGRAM(s) IV Intermittent two times a day  lidocaine   4% Patch 1 Patch Transdermal daily  lurasidone 40 milliGRAM(s) Oral daily  pantoprazole  Injectable 40 milliGRAM(s) IV Push daily  piperacillin/tazobactam IVPB.. 3.375 Gram(s) IV Intermittent every 12 hours  polyethylene glycol 3350 17 Gram(s) Oral daily  propofol Infusion 25 MICROgram(s)/kG/Min (15.5 mL/Hr) IV Continuous <Continuous>  senna Syrup 10 milliLiter(s) Oral at bedtime  sevelamer carbonate Powder 800 milliGRAM(s) Oral every 8 hours  sodium chloride 3%  Inhalation 4 milliLiter(s) Inhalation every 12 hours    MEDICATIONS  (PRN):  fentaNYL    Injectable 25 MICROGram(s) IV Push every 4 hours PRN Moderate Pain (4 - 6)  nystatin Powder 1 Application(s) Topical three times a day PRN fungal infection you may see and want to treat    Vital Signs Last 24 Hrs  T(C): 36.7 (27 Jan 2025 04:00), Max: 37.3 (27 Jan 2025 00:00)  T(F): 98.1 (27 Jan 2025 04:00), Max: 99.1 (27 Jan 2025 00:00)  HR: 75 (27 Jan 2025 04:00) (74 - 91)  BP: 124/74 (27 Jan 2025 04:00) (105/67 - 170/85)  BP(mean): 94 (27 Jan 2025 04:00) (81 - 120)  RR: 24 (27 Jan 2025 04:00) (16 - 26)  SpO2: 100% (27 Jan 2025 04:00) (97% - 100%)    Parameters below as of 27 Jan 2025 04:00  Patient On (Oxygen Delivery Method): ventilator    O2 Concentration (%): 30    Physical Exam:  Gen: Intubated, sedated  HEENT: intubated  Chest: equal chest rise  Cardiac: +S1 S2  Abd: non distended   Neuro: sedated    Labs:                        10.0   6.98  )-----------( 184      ( 27 Jan 2025 00:32 )             30.8     CBC Full  -  ( 27 Jan 2025 00:32 )  WBC Count : 6.98 K/uL  RBC Count : 3.24 M/uL  Hemoglobin : 10.0 g/dL  Hematocrit : 30.8 %  Platelet Count - Automated : 184 K/uL  Mean Cell Volume : 95.1 fl  Mean Cell Hemoglobin : 30.9 pg  Mean Cell Hemoglobin Concentration : 32.5 g/dL  Auto Neutrophil # : 5.94 K/uL  Auto Lymphocyte # : 0.61 K/uL  Auto Monocyte # : 0.31 K/uL  Auto Eosinophil # : 0.06 K/uL  Auto Basophil # : 0.00 K/uL  Auto Neutrophil % : 85.1 %  Auto Lymphocyte % : 8.7 %  Auto Monocyte % : 4.4 %  Auto Eosinophil % : 0.9 %  Auto Basophil % : 0.0 %    01-27    143  |  98  |  92[H]  ----------------------------<  139[H]  3.6   |  20[L]  |  4.68[H]    Ca    9.5      27 Jan 2025 00:32  Phos  8.9     01-27  Mg     3.0     01-27    TPro  6.9  /  Alb  3.3  /  TBili  0.5  /  DBili  x   /  AST  87[H]  /  ALT  92[H]  /  AlkPhos  155[H]  01-27    PT/INR - ( 27 Jan 2025 00:32 )   PT: 10.2 sec;   INR: 0.89 ratio         PTT - ( 27 Jan 2025 00:32 )  PTT:28.3 sec  Bilirubin Total: 0.5 mg/dL (01-27-25 @ 00:32)  Bilirubin Total: 0.6 mg/dL (01-26-25 @ 12:10)   [facility] INPATIENT PROGRESS NOTE     Interval Hx:   01-27-25: Mr. Gr was seen at bedside today, continues in MICU intubated, no overnight events noted otherwise by nursing staff at bedside this morning, AFP/BHCG normal,     Meds:   MEDICATIONS  (STANDING):  albuterol/ipratropium for Nebulization 3 milliLiter(s) Nebulizer every 6 hours  atorvastatin 20 milliGRAM(s) Oral at bedtime  bacitracin   Ointment 1 Application(s) Topical two times a day  chlorhexidine 0.12% Liquid 15 milliLiter(s) Oral Mucosa every 12 hours  chlorhexidine 2% Cloths 1 Application(s) Topical <User Schedule>  chlorhexidine 2% Cloths 1 Application(s) Topical <User Schedule>  escitalopram 15 milliGRAM(s) Oral daily  gabapentin Solution 300 milliGRAM(s) Oral two times a day  heparin   Injectable 7500 Unit(s) SubCutaneous every 12 hours  influenza  Vaccine (HIGH DOSE) 0.5 milliLiter(s) IntraMuscular once  lacosamide IVPB 100 milliGRAM(s) IV Intermittent every 12 hours  lamoTRIgine 100 milliGRAM(s) Oral two times a day  levETIRAcetam  IVPB 1500 milliGRAM(s) IV Intermittent two times a day  lidocaine   4% Patch 1 Patch Transdermal daily  lurasidone 40 milliGRAM(s) Oral daily  pantoprazole  Injectable 40 milliGRAM(s) IV Push daily  piperacillin/tazobactam IVPB.. 3.375 Gram(s) IV Intermittent every 12 hours  polyethylene glycol 3350 17 Gram(s) Oral daily  propofol Infusion 25 MICROgram(s)/kG/Min (15.5 mL/Hr) IV Continuous <Continuous>  senna Syrup 10 milliLiter(s) Oral at bedtime  sevelamer carbonate Powder 800 milliGRAM(s) Oral every 8 hours  sodium chloride 3%  Inhalation 4 milliLiter(s) Inhalation every 12 hours    MEDICATIONS  (PRN):  fentaNYL    Injectable 25 MICROGram(s) IV Push every 4 hours PRN Moderate Pain (4 - 6)  nystatin Powder 1 Application(s) Topical three times a day PRN fungal infection you may see and want to treat    Vital Signs Last 24 Hrs  T(C): 36.7 (27 Jan 2025 04:00), Max: 37.3 (27 Jan 2025 00:00)  T(F): 98.1 (27 Jan 2025 04:00), Max: 99.1 (27 Jan 2025 00:00)  HR: 75 (27 Jan 2025 04:00) (74 - 91)  BP: 124/74 (27 Jan 2025 04:00) (105/67 - 170/85)  BP(mean): 94 (27 Jan 2025 04:00) (81 - 120)  RR: 24 (27 Jan 2025 04:00) (16 - 26)  SpO2: 100% (27 Jan 2025 04:00) (97% - 100%)    Parameters below as of 27 Jan 2025 04:00  Patient On (Oxygen Delivery Method): ventilator    O2 Concentration (%): 30    Physical Exam:  Gen: Intubated, sedated  HEENT: intubated  Chest: equal chest rise  Cardiac: +S1 S2  Abd: non distended   Neuro: sedated    Labs:                        10.0   6.98  )-----------( 184      ( 27 Jan 2025 00:32 )             30.8     CBC Full  -  ( 27 Jan 2025 00:32 )  WBC Count : 6.98 K/uL  RBC Count : 3.24 M/uL  Hemoglobin : 10.0 g/dL  Hematocrit : 30.8 %  Platelet Count - Automated : 184 K/uL  Mean Cell Volume : 95.1 fl  Mean Cell Hemoglobin : 30.9 pg  Mean Cell Hemoglobin Concentration : 32.5 g/dL  Auto Neutrophil # : 5.94 K/uL  Auto Lymphocyte # : 0.61 K/uL  Auto Monocyte # : 0.31 K/uL  Auto Eosinophil # : 0.06 K/uL  Auto Basophil # : 0.00 K/uL  Auto Neutrophil % : 85.1 %  Auto Lymphocyte % : 8.7 %  Auto Monocyte % : 4.4 %  Auto Eosinophil % : 0.9 %  Auto Basophil % : 0.0 %    01-27    143  |  98  |  92[H]  ----------------------------<  139[H]  3.6   |  20[L]  |  4.68[H]    Ca    9.5      27 Jan 2025 00:32  Phos  8.9     01-27  Mg     3.0     01-27    TPro  6.9  /  Alb  3.3  /  TBili  0.5  /  DBili  x   /  AST  87[H]  /  ALT  92[H]  /  AlkPhos  155[H]  01-27    PT/INR - ( 27 Jan 2025 00:32 )   PT: 10.2 sec;   INR: 0.89 ratio         PTT - ( 27 Jan 2025 00:32 )  PTT:28.3 sec  Bilirubin Total: 0.5 mg/dL (01-27-25 @ 00:32)  Bilirubin Total: 0.6 mg/dL (01-26-25 @ 12:10)

## 2025-01-27 NOTE — CONSULT NOTE ADULT - SUBJECTIVE AND OBJECTIVE BOX
HPI:  69M w/ hx of metastatic testicular cancer (s/p L orchiectomy, on etoposide/cisplatin chemo, last dose 6/2024), epilepsy (grand-mal seizures), schizophrenia, developmental delay, HTN, HLD, VAZQUEZ, stage III CKD, severe obesity, secondary hyperparathyroidism, anemia, thrombocytopenia who presented to ED for fall, possible seizure prior. Sister at bedside providing history as well. Patient reports last seizure 3 years ago, is adherent with medications (on lamotrigine and keppra), states he believes he had a seizure at home while ambulating to get mail from his mailbox. States he doesn't typically have an aura, just 'blacks out' for about 5 minutes. Notes this time was found by neighbor with abrasion to forehead, R cheek. Unsure time down, denies tongue-biting, b/b incontinence. Normally ambulates with cane. Per ED triage note, on EMS arrival, appeared in post-ictal state. Endorsing R sided chest pressure, and neck tenderness post fall. States has been in normal state of health, eating/drinking well, did have some hot flashes/chills last night, but did not take temperature. Denies any f/c, cough, SOB, rhinorrhea, sore throat, or any other acute sx.     In ED received 100 mg lamotrigine, keppra 1g. CTH/C spine/MF largely unremarkable apart from  mild right supraorbital and premaxillary/buccal soft tissue swelling/hematomas. CXR without focal consolidation. CBC and BMP largely unremarkable; noted to be influenza positive. Trop elevated for which cardiology was consulted; recommended TTE. Medicine called for admission.   (16 Jan 2025 17:27)    PERTINENT PM/SXH:   Hypertension, unspecified type    Other hyperlipidemia    History of epilepsy    Paranoid schizophrenia    Obstructive sleep apnea    Testicular cancer      H/O Spinal surgery      FAMILY HISTORY:    Family Hx substance abuse [ ]yes [ ]no  ITEMS NOT CHECKED ARE NOT PRESENT    SOCIAL HISTORY:   Significant other/partner[ ]  Children[ ]  Druze/Spirituality:  Substance hx:  [ ]   Tobacco hx:  [ ]   Alcohol hx: [ ]   Home Opioid hx:  [ ] I-Stop Reference No:  Living Situation: [x ]Home  [ ]Long term care  [ ]Rehab [ ]Other    ADVANCE DIRECTIVES:    DNR/MOLST  [ ]  Living Will  [ ]   DECISION MAKER(s):  [ ] Health Care Proxy(s)  [ x] Surrogate(s)  [ ] Guardian           Name(s): Phone Number(s):  Sister:  Ester KEBEDE (I)ADL(s) (prior to admission):  [place]: [ x]Total  [ ] Moderate [ ]Dependent    Allergies    penicillin (Hives)  seasonal allergies (Unknown)    Intolerances    latex (Rash)  MEDICATIONS  (STANDING):  albuterol/ipratropium for Nebulization 3 milliLiter(s) Nebulizer every 6 hours  atorvastatin 20 milliGRAM(s) Oral at bedtime  bacitracin   Ointment 1 Application(s) Topical two times a day  chlorhexidine 0.12% Liquid 15 milliLiter(s) Oral Mucosa every 12 hours  chlorhexidine 2% Cloths 1 Application(s) Topical <User Schedule>  chlorhexidine 2% Cloths 1 Application(s) Topical <User Schedule>  escitalopram 15 milliGRAM(s) Oral daily  gabapentin Solution 300 milliGRAM(s) Oral two times a day  heparin   Injectable 7500 Unit(s) SubCutaneous every 8 hours  influenza  Vaccine (HIGH DOSE) 0.5 milliLiter(s) IntraMuscular once  lacosamide IVPB 100 milliGRAM(s) IV Intermittent every 12 hours  lamoTRIgine 100 milliGRAM(s) Oral two times a day  levETIRAcetam  IVPB 1500 milliGRAM(s) IV Intermittent two times a day  lidocaine   4% Patch 1 Patch Transdermal daily  lurasidone 40 milliGRAM(s) Oral daily  pantoprazole  Injectable 40 milliGRAM(s) IV Push daily  piperacillin/tazobactam IVPB.. 3.375 Gram(s) IV Intermittent every 12 hours  polyethylene glycol 3350 17 Gram(s) Oral daily  propofol Infusion 25 MICROgram(s)/kG/Min (15.5 mL/Hr) IV Continuous <Continuous>  senna Syrup 10 milliLiter(s) Oral at bedtime  sevelamer carbonate Powder 800 milliGRAM(s) Oral every 8 hours    MEDICATIONS  (PRN):  fentaNYL    Injectable 25 MICROGram(s) IV Push every 4 hours PRN Moderate Pain (4 - 6)  nystatin Powder 1 Application(s) Topical three times a day PRN fungal infection you may see and want to treat    PRESENT SYMPTOMS: [x ]Unable to self-report  [ ] CPOT [ ] PAINADs [ ] RDOS  Source if other than patient:  [ ]Family   [ ]Team     Pain: [ ]yes [ ]no  QOL impact -   Location -                    Aggravating factors -  Quality -  Radiation -  Timing-  Severity (0-10 scale):  Minimal acceptable level (0-10 scale):     CPOT:  0  https://www.[facility].org/getattachment/[email or web address]/Critical-Care-Pain-Observation-Tool-(CPOT)    PAIN AD Score:   http://geriatrictoolkit.[email or web address]/painad.pdf (press ctrl +  left click to view)    Dyspnea:                           [ ]Mild [ ]Moderate [ ]Severe      RDOS:  0 to 2  minimal or no respiratory distress   3  mild distress  4 to 6 moderate distress  >7 severe distress  https://homecareinformation.net/handouts/hen/Respiratory_Distress_Observation_Scale.pdf (Ctrl +  left click to view)     Anxiety:                             [ ]Mild [ ]Moderate [ ]Severe  Fatigue:                             [ ]Mild [ ]Moderate [ ]Severe  Nausea:                             [ ]Mild [ ]Moderate [ ]Severe  Loss of appetite:              [ ]Mild [ ]Moderate [ ]Severe  Constipation:                    [ ]Mild [ ]Moderate [ ]Severe    PCSSQ [Palliative Care Spiritual Screening Question]   Severity (0-10): 0  Score of 4 or > indicate consideration of Chaplaincy referral.  Chaplaincy Referral: [ ] yes [ ] refused [ ] following  deferred xx     Caregiver [place]? : [x ] yes [ ] no  Social work referral [ ] Patient & Family Centered Care Referral [ ]     Anticipatory Grief Present?: [x ] yes [ ] no  Social work referral [ ]  Patient & Family Centered Care Referral [ ]       Other Symptoms:  [x ]All other review of systems negative     Palliative Performance Status Version 2:         20%    http://npcrc.org/files/news/palliative_performance_scale_ppsv2.pdf  PHYSICAL EXAM:  Vital Signs Last 24 Hrs  T(C): 36.7 (27 Jan 2025 04:00), Max: 37.3 (27 Jan 2025 00:00)  T(F): 98.1 (27 Jan 2025 04:00), Max: 99.1 (27 Jan 2025 00:00)  HR: 76 (27 Jan 2025 11:29) (70 - 91)  BP: 113/63 (27 Jan 2025 10:00) (105/67 - 170/85)  BP(mean): 82 (27 Jan 2025 10:00) (80 - 120)  RR: 24 (27 Jan 2025 10:00) (24 - 24)  SpO2: 100% (27 Jan 2025 11:29) (97% - 100%)    Parameters below as of 27 Jan 2025 11:29  Patient On (Oxygen Delivery Method): ventilator     I&O's Summary    26 Jan 2025 07:01  -  27 Jan 2025 07:00  --------------------------------------------------------  IN: 1497.6 mL / OUT: 1480 mL / NET: 17.6 mL      GENERAL: [ ]Cachexia    [ ]Alert  [ ]Oriented x   [ ]Lethargic  [x ]Unarousable  [ ]Verbal  [ x]Non-Verbal  Behavioral:   [ ] Anxiety  [ ] Delirium [ ] Agitation [ x] Other  HEENT:  [ ]Normal   [ ]Dry mouth   [x ]ET Tube/Trach  [ ]Oral lesions  PULMONARY:   [x ]Clear [ ]Tachypnea  [ ]Audible excessive secretions   [ ]Rhonchi        [ ]Right [ ]Left [ ]Bilateral  [ ]Crackles        [ ]Right [ ]Left [ ]Bilateral  [ ]Wheezing     [ ]Right [ ]Left [ ]Bilateral  [ ]Diminished breath sounds [ ]right [ ]left [ ]bilateral  CARDIOVASCULAR:    [x ]Regular [ ]Irregular [ ]Tachy  [ ]Sravan [ ]Murmur [ ]Other  GASTROINTESTINAL:  [ x]Soft  [ ]Distended   [ ]+BS  [ ]Non tender [ ]Tender  [ ]Other [ ]PEG [x ]OGT/ NGT  Last BM:  GENITOURINARY:  [ ]Normal [x ] Incontinent   [ ]Oliguria/Anuria   [ ][place]  MUSCULOSKELETAL:   [ ]Normal   [ ]Weakness  [x ]Bed/Wheelchair bound [ ]Edema  NEUROLOGIC:   [ ]No focal deficits  [ ]Cognitive impairment  [ ]Dysphagia [ ]Dysarthria [ ]Paresis [x ]Other   SKIN:   [ ]Normal  [ ]Rash  [ x]Other  [ ]Pressure ulcer(s)       Present on admission [ ]y [ ]n    CRITICAL CARE:  [ ] Shock Present  [ ]Septic [ ]Cardiogenic [ ]Neurologic [ ]Hypovolemic  [ ]  Vasopressors [ ]  Inotropes   [x ]Respiratory failure present [x ]Mechanical ventilation [ ]Non-invasive ventilatory support [ ]High flow  Mode: CPAP with PS, FiO2: 30, PEEP: 5, PS: 15, MAP: 9, PIP: 21  [ x]Acute  [ ]Chronic [ ]Hypoxic  [ ]Hypercarbic [ ]Other  [ ]Other organ failure     LABS:                        10.0   6.98  )-----------( 184      ( 27 Jan 2025 00:32 )             30.8   01-27    143  |  98  |  92[H]  ----------------------------<  139[H]  3.6   |  20[L]  |  4.68[H]    Ca    9.5      27 Jan 2025 00:32  Phos  8.9     01-27  Mg     3.0     01-27    TPro  6.9  /  Alb  3.3  /  TBili  0.5  /  DBili  x   /  AST  87[H]  /  ALT  92[H]  /  AlkPhos  155[H]  01-27  PT/INR - ( 27 Jan 2025 00:32 )   PT: 10.2 sec;   INR: 0.89 ratio         PTT - ( 27 Jan 2025 00:32 )  PTT:28.3 sec    Urinalysis Basic - ( 27 Jan 2025 00:32 )    Color: x / Appearance: x / SG: x / pH: x  Gluc: 139 mg/dL / Ketone: x  / Bili: x / Urobili: x   Blood: x / Protein: x / Nitrite: x   Leuk Esterase: x / RBC: x / WBC x   Sq Epi: x / Non Sq Epi: x / Bacteria: x      RADIOLOGY & ADDITIONAL STUDIES:    < from: CT Lumbar Spine No Cont (01.26.25 @ 13:53) >  ACC: [id number] EXAM:  CT LUMBAR SPINE   ORDERED BY:  JULIANNA INGRAM     ACC: [id number] EXAM:  CT THORACIC SPINE   ORDERED BY:  JULIANNA INGRAM     PROCEDURE DATE:  01/26/2025          INTERPRETATION:  CLINICAL INFORMATION: spine hardware    COMPARISON: CT chest from 4/9/2024 and MRI from 1/24/2025.    TECHNIQUE: Noncontrast CT of the thoracic and lumbar spine was performed   with multiplanar reformations.    FINDINGS:    Postsurgical changes of posterior cemented fusion from T5 to L2. 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 This abnormality creates a focal pronounced   kyphosis. No hardware fracture is appreciated. No acute fracture is seen.   Mild compression deformity of the T5 vertebral body appears stable from   prior. Sacrum is intact. Urinary catheter noted. Orogastric tube noted.   Endotracheal tube noted. Atrophy of the paraspinal muscles. Right lower   lobeconsolidation.    IMPRESSION:    1. Multifocal lung abnormalities, with by basilar atelectasis or   consolidation and patchy airspace opacities in the right upper lobe.    2. No acute abnormality of the spine. Stable appearance of post surgical   changes and hardware.    --- End of Report ---    < end of copied text >  < from: MR Head w/wo IV Cont (01.25.25 @ 04:18) >  ACC: [id number] EXAM:  MR ORBIT FACE AND ORNECK WAWIC   ORDERED BY:   LASHAE LEGER     ACC: [id number] EXAM:  MR BRAIN WAW IC   ORDERED BY: LASHAE LEGER     PROCEDURE DATE:  01/25/2025          INTERPRETATION:  Two examinations were performedon this patient:    1. MR of the neck with and without gadolinium  2. MR of the brain with and without gadolinium    CLINICAL INFORMATION (both examinations):    Seizures, metastatic disease    Visual changes.      1. MR neck with and without gadolinium    TECHNIQUE:   Coronal T1-weighted, coronal fat-saturated T2-weighted and   axial and coronal postgadolinium fat-saturated T1-weighted images of the   neck were obtained.    FINDINGS:   No prior similar studies are available for review.    No mass is seen. Minimal reactive cervical lymph nodes are noted.    The submandibular and parotid glands are intact.1.6 cm RIGHT thyroid   nodule.      The aerodigestive tract demonstrates normal enhancement. Endotracheal and   orogastric tubes are noted.    The paranasal sinuses are significant for mild BILATERAL maxillary sinus   mucosal thickening and moderate RIGHT maxillary sinus secretions.   Secretions are seen within the nasopharynx.  The nasal cavity appears   intact.  The deep facial spaces are intact. Marked LEFT and mild RIGHT   mucosal thickening within the BILATERAL mastoid air cells.    4.2 cm RIGHT upper lobe mass/infiltrate.    The cervical and upper thoracic spine is intact.      2.  MR brain with and without gadolinium    TECHNIQUE:   Sagittal and axial T1-weighted images, sagittal axial FLAIR   images, axial  T2-weighted images and axial diffusion weighted images of   the brain were obtained. Following gadolinium administration isotropic   volumetric T1-weighted images were obtained; this data set was   reformatted using imaging post processing software into multiple imaging   planes.    FINDINGS:   No prior similar studies are available for review.    The brain demonstrates mild periventricular and moderate deep and   subcortical white matter ischemia. 5.4 mm enhancing lesion in the LEFT   middle cerebellar peduncle with associated edema suspicious for   metastases.  No acute cerebral cortical infarct is found.   No   intracranial hemorrhage is recognized.  No mass effect is found in the   brain.    The ventricles, sulci and basal cisterns appear unremarkable.    The vertebral and internal carotid arteries demonstrate expected flow   voids indicating their patency.    The central skull base and temporal bones are intact. The orbits are   intact The calvarium appears unremarkable.      IMPRESSION:    MR brain: Mild periventricular and moderate deep and subcortical white   matter ischemia. 5.4 mm enhancing lesion in the LEFT middle cerebellar   peduncle with associated edema suspicious for metastases.    Marked LEFT   and mild RIGHT mucosal thickening within the BILATERAL mastoid air cells.    MRA NECK: No lymphadenopathy. 1.6 cm RIGHT thyroid nodule.4.2 cm RIGHT   upper lobe mass/infiltrate. Mild BILATERAL maxillary sinus mucosal   thickening and moderate RIGHT maxillary sinus secretions. Secretions are   seen within the nasopharynx.    --- End of Report ---      < end of copied text >      PROTEIN CALORIE MALNUTRITION PRESENT: [ ]mild [ ]moderate [ ]severe [ ]underweight [ ]morbid obesity  https://www.andeal.org/vault/2440/web/files/ONC/Table_Clinical%20Characteristics%20to%20Document%20Malnutrition-White%20JV%20et%20al%675074.pdf    Height (cm): 167.6 (01-17-25 @ 21:02), 157.5 (07-10-24 @ 15:08), 157.5 (06-11-24 @ 17:20)  Weight (kg): 103.2 (01-17-25 @ 21:02), 104.8 (07-10-24 @ 15:08), 90.7 (06-11-24 @ 17:20)  BMI (kg/m2): 36.7 (01-17-25 @ 21:02), 42.2 (07-10-24 @ 15:08), 36.6 (06-11-24 @ 17:20)    [ ]PPSV2 < or = to 30% [ ]significant weight loss  [ ]poor nutritional intake  [ ]anasarca[ ]Artificial Nutrition      Other REFERRALS:  [ ]Hospice  [ ]Child Life  [ x]Social Work  [ ]Case management [ ]Holistic Therapy

## 2025-01-27 NOTE — CONSULT NOTE ADULT - TIME BILLING
Chart review, exam, counseling, coordination of care
I have spent  75 minutes of time including pain assessment, coordination of care and supportive counseling

## 2025-01-27 NOTE — CONSULT NOTE ADULT - PROBLEM SELECTOR RECOMMENDATION 4
Prior to hospitalization, patient lived home and independent ADL;s   Sister Ester would often check in and assist with any needs
-By hx.

## 2025-01-27 NOTE — CONSULT NOTE ADULT - PROBLEM SELECTOR RECOMMENDATION 2
-S/p RRT this AM, febrile to 101F during RRT  -Febrile this afternoon to 102F  -Continue ABX  -F/u RVP  -F/u BC, UC  -Check LE duplex  -Trend leukocytosis, fever curve.
Status epilepticus/grand mal  Continue AED per team  Neuro following  MRI with new mets/lesion

## 2025-01-27 NOTE — PROGRESS NOTE ADULT - ASSESSMENT
68 y/o M with PMH of metastatic testicular cancer (s/p L orchiectomy, on etoposide/cisplatin chemo, last dose 6/2024), epilepsy (grand-mal seizures), schizophrenia, developmental delay, HTN, HLD, VAZQUEZ, stage III CKD, severe obesity, secondary hyperparathyroidism, anemia, thrombocytopenia who presented to ED for fall, possible seizure prior. On 1/17 patient had multiple RRTs called for grand mal seizure activity and  patient had x2 more episode and was ultimately given ativan 2mg, Vimpat load 200mg, and intubated for airway protection with MICU transfer. Extubated 1/19 - to AVAPS, post extubation with increased secretion. Downgraded to medical floor 1/21. Pt still with fevers, s/p RRT 1/23 AM for fever, hypoxia.  AVAPS - O2 sats 98% but tachypneic to 40, tachycardic, pt endorsing SOB. RRT ---> intubated for respiratory failure. also febrile and Hypertensive. CKD      1- CKD III  with SHAGUFTA   2- CHF   3- fevers  4- tachycardia  5- respiratory failure  6- HTN     cr worsening  suspect ischemic ATN in setting of infection and hypotension   fluid status improved based on pocus by the primary team   off diuretics now   cr still quite elevated.  but pt is non oliguric  cr stabilizing   trend cr and lytes   zosyn 3,375 g iv  cont   broad spectrum antibiotic   vent support   d/w icu team when seen earlier

## 2025-01-27 NOTE — CHART NOTE - NSCHARTNOTEFT_GEN_A_CORE
Mr. Gr is a 69 year old male with PMH of metastatic testicular cancer (s/p L orchiectomy, on etoposide/cisplatin chemo, last dose 6/2024), epilepsy (grand-mal seizures), schizophrenia, developmental delay, HTN, HLD, VAZQUEZ, stage III CKD, severe obesity, secondary hyperparathyroidism, anemia, thrombocytopenia who presented to ED for fall and possible seizure.     - vEEG with moderate generalized slowing and no evidence for focal slowing, epileptiform discharges, or seizures  - MRI brain w and wo, reviewed: Mild periventricular and moderate deep and subcortical white   matter ischemia. 5.4 mm enhancing lesion in the LEFT middle cerebellar peduncle with associated edema suspicious for metastases.     Impression:  Patient with syncopal event in the setting of influenza A infection, likely from toxic/metabolic or peripheral process in setting of acute medical issues. Cardiac etiology also a possibility. Seizure cannot be entirely excluded but much lower on the differential; if it did happen would favor provoked in the above setting. He is back to neurologic baseline without any recurrent events, focal neurologic deficits, or abnormal movements. MRI Brain with evidence of metastatic lesion in L cerebellar peduncle, however this does not explain a potential seizure     Recommendations:     - Continue current ASMs - Keppra 1.5g PO/IV BID, lamotrigine 100mg PO BID, gabapentin 300mg PO BID, Vimpat 100mg PO/IV BID  - Continue to address acute medical problems, as you are doing  - Appreciate Heme/Onc recs  - Neurology to sign off at this time, please call spectra [phone] if there are any questions  - Post discharge, patient should follow with Neurology/neuro-oncology     Case discussed with Neurology attending Dr. Gomez. Mr. Gr is a 69 year old male with PMH of metastatic testicular cancer (s/p L orchiectomy, on etoposide/cisplatin chemo, last dose 6/2024), epilepsy (grand-mal seizures), schizophrenia, developmental delay, HTN, HLD, VAZQUEZ, stage III CKD, severe obesity, secondary hyperparathyroidism, anemia, thrombocytopenia who presented to ED for fall and possible seizure.     - vEEG with moderate generalized slowing and no evidence for focal slowing, epileptiform discharges, or seizures  - MRI brain w and wo, reviewed: Mild periventricular and moderate deep and subcortical white   matter ischemia. 5.4 mm enhancing lesion in the LEFT middle cerebellar peduncle with associated edema suspicious for metastases.     Impression:  Patient initially with syncopal event in the setting of influenza A infection, likely from toxic/metabolic or peripheral process in setting of acute medical issues. Cardiac etiology also a possibility. Seizure cannot be entirely excluded but much lower on the differential; if it did happen would favor provoked in the above setting. He had seizure and was transferred to MICU earlier this admission and went to floor and then had respiratory distress due to sepsis and he was intubated and brought back to MICU (no seizure at that time). New MRI Brain with evidence of metastatic lesion in L cerebellar peduncle, however this would not act as a nidus for potential seizure based on location.    Recommendations:     - Continue current ASMs - Keppra 1.5g PO/IV BID, lamotrigine 100mg PO BID, gabapentin 300mg PO BID, Vimpat 100mg PO/IV BID  - Continue to address acute medical problems, as you are doing  - Appreciate Heme/Onc recs  - Neurology to sign off at this time, please call spectra [phone] if there are any questions  - Post discharge, patient should follow with Neurology/neuro-oncology     Case discussed with Neurology attending Dr. Gomez.

## 2025-01-27 NOTE — PROGRESS NOTE ADULT - PROBLEM SELECTOR PLAN 1
-S/p intubation in MICU in the setting of grand mal seizures, pneumonia, Flu  -Completed course of ABX, tamiflu for Flu. Extubated to AVAPS 1/19  -S/p RRT 1/23 AM for fevers, hypoxia requiring AVAPS  -Pt hypertensive, concern for flash pulm edema  -WBC uptrending, febrile to 101F during RRT, ? aspiration event   -S/p 2nd RRT 1/23 PM for increased WOB on AVAPS, intubated and tx to MICU   -Keep O>I as tolerated  -Continue ABX  -Continue bronchodilators  -Keep sats >90%, pH >7.2.  -remains on full vent support:  ct chest showed only 4 mm nodules:   -MRI with some patchy opacities in rigth upper lung zone: cont antibiotics  on cpap trials today  : alert and bella cont aggressive weaning trials: on zosyn

## 2025-01-27 NOTE — CONSULT NOTE ADULT - CONVERSATION DETAILS
Spoke with sister/surrogate Ester by phone.   She demonstrates a good understanding of patients medical issues including new finding of mets brain lesion.   We discussed patients medical care in the MICU and the acute finding on MRI, specifically mets brain lesions.   I asked if Tay had ever discussed his wishes if he were ever to be in a situation where life sustaining treatments were the only options to be kept alive.   Ester shares that he always said to never give up and to continue to fight on his behalf.   Ester , however is aware that at some point if medical interventions became futile she would opt for a symptom directed approach.   She added that she feels that this scenario has not yet presented itself in her opinion.  She wishes to continue any and all medical interventions including but not limited to additional intubations and CPR.  Her hope is that Tay is successfully extubated and able to participate in some rehabilitation before confronting new metastatic disease.  I shared palliative care contact information and invited her to reach out to us at any time for support .  Goals are clearly delineated.   Palliative will sign off for now . PLease reconsult if patient unable to be extubated successfully

## 2025-01-27 NOTE — CONSULT NOTE ADULT - REASON FOR ADMISSION
seizure, flu, elevated trop

## 2025-01-27 NOTE — PROGRESS NOTE ADULT - ASSESSMENT
68 y/o M with PMH of metastatic testicular cancer (s/p L orchiectomy, on etoposide/cisplatin chemo, last dose 6/2024), epilepsy (grand-mal seizures), schizophrenia, developmental delay, HTN, HLD, VAZQUEZ, stage III CKD, severe obesity, secondary hyperparathyroidism, anemia, thrombocytopenia who presented to ED for fall, possible seizure prior. On 1/17 patient had multiple RRTs called for grand mal seizure activity and  patient had x2 more episode and was ultimately given ativan 2mg, Vimpat load 200mg, and intubated for airway protection with MICU transfer. Extubated 1/19 - to AVAPS, post extubation with increased secretion. Downgraded to medical floor 1/21. Pt still with fevers, s/p RRT 1/23 AM for fever, hypoxia. Pulmonary called to consult for further evaluation.

## 2025-01-27 NOTE — CONSULT NOTE ADULT - ASSESSMENT
69M w/ hx of metastatic testicular cancer (s/p L orchiectomy, on etoposide/cisplatin chemo, last dose 6/2024), epilepsy (grand-mal seizures), schizophrenia, developmental delay, HTN, HLD, VAZQUEZ, stage III CKD, obesity, secondary hyperparathyroidism, anemia, thrombocytopenia, ? spinal surgery who was admitted on 1/16/25 for fall and syncope. On 1/17 patient had multiple RRTs called for grand mal seizure activity and  patient had x2 more episode and was ultimately given ativan 2mg, Vimpat load 200mg, and intubated for airway protection with MICU transfer. Extubated 1/19 - to AVAPS, While on medical floors; patient developed respiratory distress; now intubated for suspected aspiration pna vs flash pulmonary edema with new fever and leukocytosis.   MRI with noted brain lesions.   Palliative care called for goals of care and advance care directives.

## 2025-01-27 NOTE — CONSULT NOTE ADULT - CONSULT REQUESTED DATE/TIME
23-Jan-2025 15:12
26-Jan-2025 12:36
16-Jan-2025 15:26
16-Jan-2025 20:29
25-Jan-2025 14:01
17-Jan-2025 06:07
23-Jan-2025 17:14
25-Jan-2025 20:19
27-Jan-2025 14:00
23-Jan-2025 12:42

## 2025-01-27 NOTE — PROGRESS NOTE ADULT - SUBJECTIVE AND OBJECTIVE BOX
[facility]   [phone]  RENAL FOLLOW UP NOTE  --------------------------------------------------------------------------------  Chief Complaint:    24 hour events/subjective:    seen earlier  intubated     PAST HISTORY  --------------------------------------------------------------------------------  No significant changes to PMH, PSH, FHx, SHx, unless otherwise noted    ALLERGIES & MEDICATIONS  --------------------------------------------------------------------------------  Allergies    penicillin (Hives)  seasonal allergies (Unknown)    Intolerances    latex (Rash)    Standing Inpatient Medications  albuterol/ipratropium for Nebulization 3 milliLiter(s) Nebulizer every 6 hours  atorvastatin 20 milliGRAM(s) Oral at bedtime  bacitracin   Ointment 1 Application(s) Topical two times a day  chlorhexidine 0.12% Liquid 15 milliLiter(s) Oral Mucosa every 12 hours  chlorhexidine 2% Cloths 1 Application(s) Topical <User Schedule>  chlorhexidine 2% Cloths 1 Application(s) Topical <User Schedule>  escitalopram 15 milliGRAM(s) Oral daily  gabapentin Solution 300 milliGRAM(s) Oral two times a day  heparin   Injectable 7500 Unit(s) SubCutaneous every 8 hours  influenza  Vaccine (HIGH DOSE) 0.5 milliLiter(s) IntraMuscular once  lacosamide IVPB 100 milliGRAM(s) IV Intermittent every 12 hours  lamoTRIgine 100 milliGRAM(s) Oral two times a day  levETIRAcetam  IVPB 1500 milliGRAM(s) IV Intermittent two times a day  lidocaine   4% Patch 1 Patch Transdermal daily  lurasidone 40 milliGRAM(s) Oral daily  pantoprazole  Injectable 40 milliGRAM(s) IV Push daily  piperacillin/tazobactam IVPB.. 3.375 Gram(s) IV Intermittent every 12 hours  polyethylene glycol 3350 17 Gram(s) Oral daily  propofol Infusion 25 MICROgram(s)/kG/Min IV Continuous <Continuous>  senna Syrup 10 milliLiter(s) Oral at bedtime  sevelamer carbonate Powder 800 milliGRAM(s) Oral every 8 hours    PRN Inpatient Medications  fentaNYL    Injectable 25 MICROGram(s) IV Push every 4 hours PRN  nystatin Powder 1 Application(s) Topical three times a day PRN      REVIEW OF SYSTEMS  --------------------------------------------------------------------------------    intubated     VITALS/PHYSICAL EXAM  --------------------------------------------------------------------------------  T(C): 37 (01-27-25 @ 16:00), Max: 37.3 (01-27-25 @ 00:00)  HR: 81 (01-27-25 @ 19:00) (70 - 90)  BP: 114/60 (01-27-25 @ 19:00) (105/67 - 163/79)  RR: 20 (01-27-25 @ 19:00) (14 - 24)  SpO2: 100% (01-27-25 @ 19:00) (97% - 100%)  Wt(kg): --        01-26-25 @ 07:01  -  01-27-25 @ 07:00  --------------------------------------------------------  IN: 1497.6 mL / OUT: 1480 mL / NET: 17.6 mL      Physical Exam:  	        Gen: intubated  obese   	no jvd  	Pulm: decrease bs  no rales or ronchi or wheezing  	CV: tachy  S1S2; no rub  	Abd: hypoactive bs soft distended  	UE: Warm, no cyanosis  no clubbing,  no edema;   	LE: Warm, no cyanosis  no clubbing, no edema  	Neuro:  intubated       LABS/STUDIES  --------------------------------------------------------------------------------              10.0   6.98  >-----------<  184      [01-27-25 @ 00:32]              30.8     143  |  98  |  92  ----------------------------<  139      [01-27-25 @ 00:32]  3.6   |  20  |  4.68        Ca     9.5     [01-27-25 @ 00:32]      Mg     3.0     [01-27-25 @ 00:32]      Phos  8.9     [01-27-25 @ 00:32]    TPro  6.9  /  Alb  3.3  /  TBili  0.5  /  DBili  x   /  AST  87  /  ALT  92  /  AlkPhos  155  [01-27-25 @ 00:32]    PT/INR: PT 10.2 , INR 0.89       [01-27-25 @ 00:32]  PTT: 28.3       [01-27-25 @ 00:32]    Uric acid 9.2      [01-26-25 @ 00:17]        [01-26-25 @ 00:17]    Creatinine Trend:  SCr 4.68 [01-27 @ 00:32]  SCr 4.66 [01-26 @ 12:10]  SCr 4.70 [01-26 @ 00:17]  SCr 4.76 [01-25 @ 17:46]  SCr 4.70 [01-25 @ 11:31]          TSH 0.87      [01-25-25 @ 11:31]

## 2025-01-27 NOTE — PROGRESS NOTE ADULT - SUBJECTIVE AND OBJECTIVE BOX
***************************************************************  Gretta Benz, PGY-1  on TEAMS  ***************************************************************    OSCAR MILAN ([id number])- Patient is a 69y old  Male who presents with a chief complaint of seizure, flu, elevated trop (27 Jan 2025 04:55)      INTERVAL/OVERNIGHT EVENTS/SUBJECTIVE:   No acute events overnight.     Pt seen at bedside.    PHYSICAL EXAM:  General: Intubated, sedated  HEENT: Normocephalic. Atraumatic. Normal icterus. MMM  Heart: Regular rate, rhythm  Lungs: faint crackles, mild wheeze, no distress.  GI: Soft, distended/obese, nontender  Neuro: Sedated, pupils appropriate  Ext: Mild edema bilaterally.   Skin: Warm, well-perfused     Vital Signs Last 24 Hrs  T(C): 36.7 (27 Jan 2025 04:00), Max: 37.3 (27 Jan 2025 00:00)  T(F): 98.1 (27 Jan 2025 04:00), Max: 99.1 (27 Jan 2025 00:00)  HR: 75 (27 Jan 2025 06:00) (74 - 91)  BP: 120/66 (27 Jan 2025 06:00) (105/67 - 170/85)  BP(mean): 86 (27 Jan 2025 06:00) (81 - 120)  RR: 24 (27 Jan 2025 06:00) (16 - 26)  SpO2: 100% (27 Jan 2025 06:00) (97% - 100%)    Parameters below as of 27 Jan 2025 05:44  Patient On (Oxygen Delivery Method): ventilator        LABS:                        10.0   6.98  )-----------( 184      ( 27 Jan 2025 00:32 )             30.8     01-27    143  |  98  |  92[H]  ----------------------------<  139[H]  3.6   |  20[L]  |  4.68[H]    Ca    9.5      27 Jan 2025 00:32  Phos  8.9     01-27  Mg     3.0     01-27    TPro  6.9  /  Alb  3.3  /  TBili  0.5  /  DBili  x   /  AST  87[H]  /  ALT  92[H]  /  AlkPhos  155[H]  01-27    ABG - ( 27 Jan 2025 00:20 )  pH, Arterial: 7.42  pH, Blood: x     /  pCO2: 35    /  pO2: 126   / HCO3: 23    / Base Excess: -1.4  /  SaO2: 98.5              Lactate Trend  01-23 @ 00:39 Lactate:0.9     PT/INR - ( 27 Jan 2025 00:32 )   PT: 10.2 sec;   INR: 0.89 ratio         PTT - ( 27 Jan 2025 00:32 )  PTT:28.3 sec  Urinalysis Basic - ( 27 Jan 2025 00:32 )    Color: x / Appearance: x / SG: x / pH: x  Gluc: 139 mg/dL / Ketone: x  / Bili: x / Urobili: x   Blood: x / Protein: x / Nitrite: x   Leuk Esterase: x / RBC: x / WBC x   Sq Epi: x / Non Sq Epi: x / Bacteria: x          CAPILLARY BLOOD GLUCOSE      POCT Blood Glucose.: 141 mg/dL (27 Jan 2025 04:57)      Culture - Sputum (collected 25 Jan 2025 07:21)  Source: .Sputum Sputum  Gram Stain (26 Jan 2025 23:12):    Numerous polymorphonuclear leukocytes per low power field    Rare Squamous epithelial cells per low power field    No organisms seen  Preliminary Report (26 Jan 2025 23:12):    Rare Pseudomonas aeruginosa    Culture - Urine (collected 24 Jan 2025 17:16)  Source: Catheterized Catheterized  Final Report (26 Jan 2025 02:35):    No growth      I&O's Summary    25 Jan 2025 07:01  -  26 Jan 2025 07:00  --------------------------------------------------------  IN: 1343.4 mL / OUT: 2695 mL / NET: -1351.6 mL    26 Jan 2025 07:01  -  27 Jan 2025 06:39  --------------------------------------------------------  IN: 1497.6 mL / OUT: 1480 mL / NET: 17.6 mL        Medications:  MEDICATIONS  (STANDING):  albuterol/ipratropium for Nebulization 3 milliLiter(s) Nebulizer every 6 hours  atorvastatin 20 milliGRAM(s) Oral at bedtime  bacitracin   Ointment 1 Application(s) Topical two times a day  chlorhexidine 0.12% Liquid 15 milliLiter(s) Oral Mucosa every 12 hours  chlorhexidine 2% Cloths 1 Application(s) Topical <User Schedule>  chlorhexidine 2% Cloths 1 Application(s) Topical <User Schedule>  escitalopram 15 milliGRAM(s) Oral daily  gabapentin Solution 300 milliGRAM(s) Oral two times a day  heparin   Injectable 7500 Unit(s) SubCutaneous every 12 hours  influenza  Vaccine (HIGH DOSE) 0.5 milliLiter(s) IntraMuscular once  lacosamide IVPB 100 milliGRAM(s) IV Intermittent every 12 hours  lamoTRIgine 100 milliGRAM(s) Oral two times a day  levETIRAcetam  IVPB 1500 milliGRAM(s) IV Intermittent two times a day  lidocaine   4% Patch 1 Patch Transdermal daily  lurasidone 40 milliGRAM(s) Oral daily  pantoprazole  Injectable 40 milliGRAM(s) IV Push daily  piperacillin/tazobactam IVPB.. 3.375 Gram(s) IV Intermittent every 12 hours  polyethylene glycol 3350 17 Gram(s) Oral daily  propofol Infusion 25 MICROgram(s)/kG/Min (15.5 mL/Hr) IV Continuous <Continuous>  senna Syrup 10 milliLiter(s) Oral at bedtime  sevelamer carbonate Powder 800 milliGRAM(s) Oral every 8 hours  sodium chloride 3%  Inhalation 4 milliLiter(s) Inhalation every 12 hours    MEDICATIONS  (PRN):  fentaNYL    Injectable 25 MICROGram(s) IV Push every 4 hours PRN Moderate Pain (4 - 6)  nystatin Powder 1 Application(s) Topical three times a day PRN fungal infection you may see and want to treat      RADIOLOGY & ADDITIONAL TESTS were viewed by me personally.     TELEMETRY: reviewed    EKG: reviewed    IMAGING: personally reviewed           ***************************************************************  Gretta Benz, PGY-1  on TEAMS  ***************************************************************    OSCAR MILAN ([id number])- Patient is a 69y old  Male who presents with a chief complaint of seizure, flu, elevated trop (27 Jan 2025 04:55)      INTERVAL/OVERNIGHT EVENTS/SUBJECTIVE:   No acute events overnight.     Pt seen at bedside.    PHYSICAL EXAM:  General: Intubated, sedated  HEENT: Normocephalic. Atraumatic. Normal icterus. MMM  Heart: Regular rate, rhythm  Lungs: faint crackles, mild wheeze, no distress.  GI: Soft, distended/obese, nontender  Neuro: Sedated, pupils appropriate  Ext: Trace edema bilaterally.   Skin: Warm, well-perfused     Vital Signs Last 24 Hrs  T(C): 36.7 (27 Jan 2025 04:00), Max: 37.3 (27 Jan 2025 00:00)  T(F): 98.1 (27 Jan 2025 04:00), Max: 99.1 (27 Jan 2025 00:00)  HR: 75 (27 Jan 2025 06:00) (74 - 91)  BP: 120/66 (27 Jan 2025 06:00) (105/67 - 170/85)  BP(mean): 86 (27 Jan 2025 06:00) (81 - 120)  RR: 24 (27 Jan 2025 06:00) (16 - 26)  SpO2: 100% (27 Jan 2025 06:00) (97% - 100%)    Parameters below as of 27 Jan 2025 05:44  Patient On (Oxygen Delivery Method): ventilator        LABS:                        10.0   6.98  )-----------( 184      ( 27 Jan 2025 00:32 )             30.8     01-27    143  |  98  |  92[H]  ----------------------------<  139[H]  3.6   |  20[L]  |  4.68[H]    Ca    9.5      27 Jan 2025 00:32  Phos  8.9     01-27  Mg     3.0     01-27    TPro  6.9  /  Alb  3.3  /  TBili  0.5  /  DBili  x   /  AST  87[H]  /  ALT  92[H]  /  AlkPhos  155[H]  01-27    ABG - ( 27 Jan 2025 00:20 )  pH, Arterial: 7.42  pH, Blood: x     /  pCO2: 35    /  pO2: 126   / HCO3: 23    / Base Excess: -1.4  /  SaO2: 98.5              Lactate Trend  01-23 @ 00:39 Lactate:0.9     PT/INR - ( 27 Jan 2025 00:32 )   PT: 10.2 sec;   INR: 0.89 ratio         PTT - ( 27 Jan 2025 00:32 )  PTT:28.3 sec  Urinalysis Basic - ( 27 Jan 2025 00:32 )    Color: x / Appearance: x / SG: x / pH: x  Gluc: 139 mg/dL / Ketone: x  / Bili: x / Urobili: x   Blood: x / Protein: x / Nitrite: x   Leuk Esterase: x / RBC: x / WBC x   Sq Epi: x / Non Sq Epi: x / Bacteria: x          CAPILLARY BLOOD GLUCOSE      POCT Blood Glucose.: 141 mg/dL (27 Jan 2025 04:57)      Culture - Sputum (collected 25 Jan 2025 07:21)  Source: .Sputum Sputum  Gram Stain (26 Jan 2025 23:12):    Numerous polymorphonuclear leukocytes per low power field    Rare Squamous epithelial cells per low power field    No organisms seen  Preliminary Report (26 Jan 2025 23:12):    Rare Pseudomonas aeruginosa    Culture - Urine (collected 24 Jan 2025 17:16)  Source: Catheterized Catheterized  Final Report (26 Jan 2025 02:35):    No growth      I&O's Summary    25 Jan 2025 07:01  -  26 Jan 2025 07:00  --------------------------------------------------------  IN: 1343.4 mL / OUT: 2695 mL / NET: -1351.6 mL    26 Jan 2025 07:01  -  27 Jan 2025 06:39  --------------------------------------------------------  IN: 1497.6 mL / OUT: 1480 mL / NET: 17.6 mL        Medications:  MEDICATIONS  (STANDING):  albuterol/ipratropium for Nebulization 3 milliLiter(s) Nebulizer every 6 hours  atorvastatin 20 milliGRAM(s) Oral at bedtime  bacitracin   Ointment 1 Application(s) Topical two times a day  chlorhexidine 0.12% Liquid 15 milliLiter(s) Oral Mucosa every 12 hours  chlorhexidine 2% Cloths 1 Application(s) Topical <User Schedule>  chlorhexidine 2% Cloths 1 Application(s) Topical <User Schedule>  escitalopram 15 milliGRAM(s) Oral daily  gabapentin Solution 300 milliGRAM(s) Oral two times a day  heparin   Injectable 7500 Unit(s) SubCutaneous every 12 hours  influenza  Vaccine (HIGH DOSE) 0.5 milliLiter(s) IntraMuscular once  lacosamide IVPB 100 milliGRAM(s) IV Intermittent every 12 hours  lamoTRIgine 100 milliGRAM(s) Oral two times a day  levETIRAcetam  IVPB 1500 milliGRAM(s) IV Intermittent two times a day  lidocaine   4% Patch 1 Patch Transdermal daily  lurasidone 40 milliGRAM(s) Oral daily  pantoprazole  Injectable 40 milliGRAM(s) IV Push daily  piperacillin/tazobactam IVPB.. 3.375 Gram(s) IV Intermittent every 12 hours  polyethylene glycol 3350 17 Gram(s) Oral daily  propofol Infusion 25 MICROgram(s)/kG/Min (15.5 mL/Hr) IV Continuous <Continuous>  senna Syrup 10 milliLiter(s) Oral at bedtime  sevelamer carbonate Powder 800 milliGRAM(s) Oral every 8 hours  sodium chloride 3%  Inhalation 4 milliLiter(s) Inhalation every 12 hours    MEDICATIONS  (PRN):  fentaNYL    Injectable 25 MICROGram(s) IV Push every 4 hours PRN Moderate Pain (4 - 6)  nystatin Powder 1 Application(s) Topical three times a day PRN fungal infection you may see and want to treat      RADIOLOGY & ADDITIONAL TESTS were viewed by me personally.     TELEMETRY: reviewed    EKG: reviewed    IMAGING: personally reviewed

## 2025-01-27 NOTE — PROGRESS NOTE ADULT - SUBJECTIVE AND OBJECTIVE BOX
DATE OF SERVICE: 01-27-25 @ 15:53    Patient is a 69y old  Male who presents with a chief complaint of seizure, flu, elevated trop (27 Jan 2025 15:34)      INTERVAL HISTORY: intubated    REVIEW OF SYSTEMS:  CONSTITUTIONAL: No weakness  EYES/ENT: No visual changes;  No throat pain   NECK: No pain or stiffness  RESPIRATORY: No cough, wheezing; No shortness of breath  CARDIOVASCULAR: No chest pain or palpitations  GASTROINTESTINAL: No abdominal  pain. No nausea, vomiting, or hematemesis  GENITOURINARY: No dysuria, frequency or hematuria  NEUROLOGICAL: No stroke like symptoms  SKIN: No rashes    TELEMETRY Personally reviewed:  70s-90s  	  MEDICATIONS:        PHYSICAL EXAM:  T(C): 36.7 (01-27-25 @ 04:00), Max: 37.3 (01-27-25 @ 00:00)  HR: 85 (01-27-25 @ 14:47) (70 - 91)  BP: 121/65 (01-27-25 @ 14:00) (105/67 - 170/85)  RR: 19 (01-27-25 @ 14:00) (14 - 24)  SpO2: 100% (01-27-25 @ 14:47) (97% - 100%)  Wt(kg): --  I&O's Summary    26 Jan 2025 07:01  -  27 Jan 2025 07:00  --------------------------------------------------------  IN: 1497.6 mL / OUT: 1480 mL / NET: 17.6 mL          Appearance: In no distress	  HEENT:    PERRL, EOMI	  Cardiovascular:  S1 S2, No JVD  Respiratory: Lungs clear to auscultation	  Gastrointestinal:  Soft, Non-tender, + BS	  Vascularature:  No edema of LE  Psychiatric: Appropriate affect   Neuro: no acute focal deficits                               10.0   6.98  )-----------( 184      ( 27 Jan 2025 00:32 )             30.8     01-27    143  |  98  |  92[H]  ----------------------------<  139[H]  3.6   |  20[L]  |  4.68[H]    Ca    9.5      27 Jan 2025 00:32  Phos  8.9     01-27  Mg     3.0     01-27    TPro  6.9  /  Alb  3.3  /  TBili  0.5  /  DBili  x   /  AST  87[H]  /  ALT  92[H]  /  AlkPhos  155[H]  01-27        Labs personally reviewed      ASSESSMENT/PLAN: 	    69M w/ hx of metastatic testicular cancer (s/p L orchiectomy, on etoposide/cisplatin chemo, last dose 6/2024), epilepsy (grand-mal seizures), schizophrenia, developmental delay, HTN, HLD, VAZQUEZ, stage III CKD, obesity, secondary hyperparathyroidism, anemia, thrombocytopenia, ? spinal surgery who was admitted on 1/16/25 for fall and syncope. On 1/17 patient had multiple RRTs called for grand mal seizure activity and  patient had x2 more episode and was ultimately given ativan 2mg, Vimpat load 200mg, and intubated for airway protection with MICU transfer. Extubated 1/19 - to AVAPS, post extubation with increased secretion, now better; downgraded 1/21; While on medical floors; patient developed respiratory distress; now intubated for suspected aspiration pna vs flash pulmonary edema.     Problem/Plan #1: Acute Hypoxic Respiratory Failure  - Intubated for airway protection i/s/o multiple RRTs for grand mal seizure activity, Extubated 1/19 to AVAPS  - Reintubated 1/23 after hypoxic episode for suspected aspiration pna vs flash pulmonary edema.   - BNP 3949  - CXR shows mild pulm edema on 1/16; CXR 1/25  Bibasilar atelectasis. Mild interstitial edema.. Improved aeration   in the right upper lobe  - TTE with preserved EF, no WMA, dilated aortic root  - Was on Bumex IV - stopped on 1/25 due to ^ creatinine 4.31 from 3.92  - Monitor creat  - c/w IV Abx as per primary team  - Monitor strict I&Os, daily weights    Problem/Plan #2: Hypertensive Urgency  - amlodipine increased to 10mg daily  - BP now soft. Exercise caution in further uptitrating medications.     Problem/Plan #3: HLD  - c/w atorvastatin 20mg daily    Problem/Plan #4: DVT Ppx  - c/w SQ heparin        Iolani Behrbom, REILLY-NP   [facility],  [facility]  Cardiovascular Medicine  [address] [place] [address], [address]  Available through call or text on [employer]  Office: [phone]

## 2025-01-28 LAB
ALBUMIN SERPL ELPH-MCNC: 3.1 G/DL — LOW (ref 3.3–5)
ALBUMIN SERPL ELPH-MCNC: 3.7 G/DL — SIGNIFICANT CHANGE UP (ref 3.3–5)
ALP SERPL-CCNC: 134 U/L — HIGH (ref 40–120)
ALP SERPL-CCNC: 136 U/L — HIGH (ref 40–120)
ALT FLD-CCNC: 66 U/L — HIGH (ref 10–45)
ALT FLD-CCNC: 72 U/L — HIGH (ref 10–45)
ANION GAP SERPL CALC-SCNC: 21 MMOL/L — HIGH (ref 5–17)
ANION GAP SERPL CALC-SCNC: 25 MMOL/L — HIGH (ref 5–17)
APTT BLD: 29.7 SEC — SIGNIFICANT CHANGE UP (ref 24.5–35.6)
AST SERPL-CCNC: 65 U/L — HIGH (ref 10–40)
AST SERPL-CCNC: 67 U/L — HIGH (ref 10–40)
BASOPHILS # BLD AUTO: 0.03 K/UL — SIGNIFICANT CHANGE UP (ref 0–0.2)
BASOPHILS NFR BLD AUTO: 0.5 % — SIGNIFICANT CHANGE UP (ref 0–2)
BILIRUB SERPL-MCNC: 0.4 MG/DL — SIGNIFICANT CHANGE UP (ref 0.2–1.2)
BILIRUB SERPL-MCNC: 0.4 MG/DL — SIGNIFICANT CHANGE UP (ref 0.2–1.2)
BUN SERPL-MCNC: 113 MG/DL — HIGH (ref 7–23)
BUN SERPL-MCNC: 114 MG/DL — HIGH (ref 7–23)
CALCIUM SERPL-MCNC: 9.3 MG/DL — SIGNIFICANT CHANGE UP (ref 8.4–10.5)
CALCIUM SERPL-MCNC: 9.7 MG/DL — SIGNIFICANT CHANGE UP (ref 8.4–10.5)
CHLORIDE SERPL-SCNC: 103 MMOL/L — SIGNIFICANT CHANGE UP (ref 96–108)
CHLORIDE SERPL-SCNC: 106 MMOL/L — SIGNIFICANT CHANGE UP (ref 96–108)
CO2 SERPL-SCNC: 18 MMOL/L — LOW (ref 22–31)
CO2 SERPL-SCNC: 21 MMOL/L — LOW (ref 22–31)
CREAT SERPL-MCNC: 4.8 MG/DL — HIGH (ref 0.5–1.3)
CREAT SERPL-MCNC: 4.86 MG/DL — HIGH (ref 0.5–1.3)
CULTURE RESULTS: SIGNIFICANT CHANGE UP
EGFR: 12 ML/MIN/1.73M2 — LOW
EGFR: 13 ML/MIN/1.73M2 — LOW
EOSINOPHIL # BLD AUTO: 0.14 K/UL — SIGNIFICANT CHANGE UP (ref 0–0.5)
EOSINOPHIL NFR BLD AUTO: 2.2 % — SIGNIFICANT CHANGE UP (ref 0–6)
GAS PNL BLDA: SIGNIFICANT CHANGE UP
GAS PNL BLDV: SIGNIFICANT CHANGE UP
GLUCOSE BLDC GLUCOMTR-MCNC: 126 MG/DL — HIGH (ref 70–99)
GLUCOSE BLDC GLUCOMTR-MCNC: 149 MG/DL — HIGH (ref 70–99)
GLUCOSE SERPL-MCNC: 123 MG/DL — HIGH (ref 70–99)
GLUCOSE SERPL-MCNC: 139 MG/DL — HIGH (ref 70–99)
HCT VFR BLD CALC: 30 % — LOW (ref 39–50)
HGB BLD-MCNC: 9.7 G/DL — LOW (ref 13–17)
IMM GRANULOCYTES NFR BLD AUTO: 3.5 % — HIGH (ref 0–0.9)
INR BLD: 0.92 RATIO — SIGNIFICANT CHANGE UP (ref 0.85–1.16)
LYMPHOCYTES # BLD AUTO: 0.68 K/UL — LOW (ref 1–3.3)
LYMPHOCYTES # BLD AUTO: 10.5 % — LOW (ref 13–44)
MAGNESIUM SERPL-MCNC: 3.2 MG/DL — HIGH (ref 1.6–2.6)
MAGNESIUM SERPL-MCNC: 3.5 MG/DL — HIGH (ref 1.6–2.6)
MCHC RBC-ENTMCNC: 31.6 PG — SIGNIFICANT CHANGE UP (ref 27–34)
MCHC RBC-ENTMCNC: 32.3 G/DL — SIGNIFICANT CHANGE UP (ref 32–36)
MCV RBC AUTO: 97.7 FL — SIGNIFICANT CHANGE UP (ref 80–100)
MONOCYTES # BLD AUTO: 0.54 K/UL — SIGNIFICANT CHANGE UP (ref 0–0.9)
MONOCYTES NFR BLD AUTO: 8.3 % — SIGNIFICANT CHANGE UP (ref 2–14)
NEUTROPHILS # BLD AUTO: 4.88 K/UL — SIGNIFICANT CHANGE UP (ref 1.8–7.4)
NEUTROPHILS NFR BLD AUTO: 75 % — SIGNIFICANT CHANGE UP (ref 43–77)
NRBC # BLD: 0 /100 WBCS — SIGNIFICANT CHANGE UP (ref 0–0)
NRBC BLD-RTO: 0 /100 WBCS — SIGNIFICANT CHANGE UP (ref 0–0)
PHOSPHATE SERPL-MCNC: 9.4 MG/DL — HIGH (ref 2.5–4.5)
PHOSPHATE SERPL-MCNC: 9.4 MG/DL — HIGH (ref 2.5–4.5)
PLATELET # BLD AUTO: 192 K/UL — SIGNIFICANT CHANGE UP (ref 150–400)
POTASSIUM SERPL-MCNC: 3.8 MMOL/L — SIGNIFICANT CHANGE UP (ref 3.5–5.3)
POTASSIUM SERPL-MCNC: 3.8 MMOL/L — SIGNIFICANT CHANGE UP (ref 3.5–5.3)
POTASSIUM SERPL-SCNC: 3.8 MMOL/L — SIGNIFICANT CHANGE UP (ref 3.5–5.3)
POTASSIUM SERPL-SCNC: 3.8 MMOL/L — SIGNIFICANT CHANGE UP (ref 3.5–5.3)
PROT SERPL-MCNC: 6.8 G/DL — SIGNIFICANT CHANGE UP (ref 6–8.3)
PROT SERPL-MCNC: 7.3 G/DL — SIGNIFICANT CHANGE UP (ref 6–8.3)
PROTHROM AB SERPL-ACNC: 10.5 SEC — SIGNIFICANT CHANGE UP (ref 9.9–13.4)
RBC # BLD: 3.07 M/UL — LOW (ref 4.2–5.8)
RBC # FLD: 13 % — SIGNIFICANT CHANGE UP (ref 10.3–14.5)
SODIUM SERPL-SCNC: 146 MMOL/L — HIGH (ref 135–145)
SODIUM SERPL-SCNC: 148 MMOL/L — HIGH (ref 135–145)
SPECIMEN SOURCE: SIGNIFICANT CHANGE UP
WBC # BLD: 6.5 K/UL — SIGNIFICANT CHANGE UP (ref 3.8–10.5)
WBC # FLD AUTO: 6.5 K/UL — SIGNIFICANT CHANGE UP (ref 3.8–10.5)

## 2025-01-28 PROCEDURE — 99291 CRITICAL CARE FIRST HOUR: CPT

## 2025-01-28 PROCEDURE — 71045 X-RAY EXAM CHEST 1 VIEW: CPT | Mod: 26

## 2025-01-28 RX ORDER — LEVETIRACETAM 750 MG/1
750 TABLET, FILM COATED ORAL
Refills: 0 | Status: DISCONTINUED | OUTPATIENT
Start: 2025-01-28 | End: 2025-02-06

## 2025-01-28 RX ORDER — SODIUM CHLORIDE 9 G/ML
1000 INJECTION, SOLUTION INTRAVENOUS
Refills: 0 | Status: DISCONTINUED | OUTPATIENT
Start: 2025-01-28 | End: 2025-01-28

## 2025-01-28 RX ORDER — GABAPENTIN 800 MG/1
150 TABLET ORAL
Refills: 0 | Status: DISCONTINUED | OUTPATIENT
Start: 2025-01-28 | End: 2025-01-29

## 2025-01-28 RX ORDER — LEVETIRACETAM 750 MG/1
750 TABLET, FILM COATED ORAL EVERY 12 HOURS
Refills: 0 | Status: DISCONTINUED | OUTPATIENT
Start: 2025-01-28 | End: 2025-01-28

## 2025-01-28 RX ADMIN — LIDOCAINE HYDROCHLORIDE 1 PATCH: 30 CREAM TOPICAL at 07:13

## 2025-01-28 RX ADMIN — IPRATROPIUM BROMIDE AND ALBUTEROL SULFATE 3 MILLILITER(S): .5; 2.5 SOLUTION RESPIRATORY (INHALATION) at 17:06

## 2025-01-28 RX ADMIN — LEVETIRACETAM 400 MILLIGRAM(S): 750 TABLET, FILM COATED ORAL at 05:03

## 2025-01-28 RX ADMIN — PIPERACILLIN SODIUM AND TAZOBACTAM SODIUM 25 GRAM(S): 2; 250 INJECTION, POWDER, FOR SOLUTION INTRAVENOUS at 05:03

## 2025-01-28 RX ADMIN — PANTOPRAZOLE 40 MILLIGRAM(S): 20 TABLET, DELAYED RELEASE ORAL at 11:40

## 2025-01-28 RX ADMIN — LAMOTRIGINE 100 MILLIGRAM(S): 100 TABLET ORAL at 17:42

## 2025-01-28 RX ADMIN — IPRATROPIUM BROMIDE AND ALBUTEROL SULFATE 3 MILLILITER(S): .5; 2.5 SOLUTION RESPIRATORY (INHALATION) at 05:52

## 2025-01-28 RX ADMIN — SEVELAMER CARBONATE 800 MILLIGRAM(S): 800 TABLET, FILM COATED ORAL at 22:07

## 2025-01-28 RX ADMIN — ANTISEPTIC SURGICAL SCRUB 1 APPLICATION(S): 0.04 SOLUTION TOPICAL at 05:04

## 2025-01-28 RX ADMIN — SODIUM CHLORIDE 50 MILLILITER(S): 9 INJECTION, SOLUTION INTRAVENOUS at 16:22

## 2025-01-28 RX ADMIN — PIPERACILLIN SODIUM AND TAZOBACTAM SODIUM 25 GRAM(S): 2; 250 INJECTION, POWDER, FOR SOLUTION INTRAVENOUS at 17:01

## 2025-01-28 RX ADMIN — ANTISEPTIC SURGICAL SCRUB 15 MILLILITER(S): 0.04 SOLUTION TOPICAL at 05:03

## 2025-01-28 RX ADMIN — SEVELAMER CARBONATE 800 MILLIGRAM(S): 800 TABLET, FILM COATED ORAL at 05:03

## 2025-01-28 RX ADMIN — LIDOCAINE HYDROCHLORIDE 1 PATCH: 30 CREAM TOPICAL at 05:04

## 2025-01-28 RX ADMIN — LAMOTRIGINE 100 MILLIGRAM(S): 100 TABLET ORAL at 05:03

## 2025-01-28 RX ADMIN — LIDOCAINE HYDROCHLORIDE 1 PATCH: 30 CREAM TOPICAL at 16:20

## 2025-01-28 RX ADMIN — ESCITALOPRAM 15 MILLIGRAM(S): 10 TABLET, FILM COATED ORAL at 11:41

## 2025-01-28 RX ADMIN — Medication 7500 UNIT(S): at 05:03

## 2025-01-28 RX ADMIN — Medication 7500 UNIT(S): at 14:42

## 2025-01-28 RX ADMIN — Medication 1 APPLICATION(S): at 05:07

## 2025-01-28 RX ADMIN — ATORVASTATIN CALCIUM 20 MILLIGRAM(S): 80 TABLET, FILM COATED ORAL at 22:08

## 2025-01-28 RX ADMIN — ANTISEPTIC SURGICAL SCRUB 1 APPLICATION(S): 0.04 SOLUTION TOPICAL at 05:05

## 2025-01-28 RX ADMIN — LEVETIRACETAM 400 MILLIGRAM(S): 750 TABLET, FILM COATED ORAL at 17:42

## 2025-01-28 RX ADMIN — LURASIDONE HYDROCHLORIDE 40 MILLIGRAM(S): 80 TABLET, FILM COATED ORAL at 11:40

## 2025-01-28 RX ADMIN — Medication 7500 UNIT(S): at 22:09

## 2025-01-28 RX ADMIN — IPRATROPIUM BROMIDE AND ALBUTEROL SULFATE 3 MILLILITER(S): .5; 2.5 SOLUTION RESPIRATORY (INHALATION) at 23:28

## 2025-01-28 RX ADMIN — IPRATROPIUM BROMIDE AND ALBUTEROL SULFATE 3 MILLILITER(S): .5; 2.5 SOLUTION RESPIRATORY (INHALATION) at 11:12

## 2025-01-28 RX ADMIN — LACOSAMIDE 120 MILLIGRAM(S): 200 TABLET, FILM COATED ORAL at 17:15

## 2025-01-28 RX ADMIN — GABAPENTIN 300 MILLIGRAM(S): 800 TABLET ORAL at 05:03

## 2025-01-28 RX ADMIN — Medication 1 APPLICATION(S): at 17:01

## 2025-01-28 RX ADMIN — LACOSAMIDE 120 MILLIGRAM(S): 200 TABLET, FILM COATED ORAL at 05:04

## 2025-01-28 NOTE — PROGRESS NOTE ADULT - SUBJECTIVE AND OBJECTIVE BOX
[facility]EMAT[facility]Y/[facility]Y INPATIENT PROGRESS NOTE     Interval Hx:   01-28-25: Mr. Gr was seen at bedside today.    Meds:   MEDICATIONS  (STANDING):  albuterol/ipratropium for Nebulization 3 milliLiter(s) Nebulizer every 6 hours  atorvastatin 20 milliGRAM(s) Oral at bedtime  bacitracin   Ointment 1 Application(s) Topical two times a day  chlorhexidine 0.12% Liquid 15 milliLiter(s) Oral Mucosa every 12 hours  chlorhexidine 2% Cloths 1 Application(s) Topical <User Schedule>  chlorhexidine 2% Cloths 1 Application(s) Topical <User Schedule>  escitalopram 15 milliGRAM(s) Oral daily  gabapentin Solution 300 milliGRAM(s) Oral two times a day  heparin   Injectable 7500 Unit(s) SubCutaneous every 8 hours  influenza  Vaccine (HIGH DOSE) 0.5 milliLiter(s) IntraMuscular once  lacosamide IVPB 100 milliGRAM(s) IV Intermittent every 12 hours  lamoTRIgine 100 milliGRAM(s) Oral two times a day  levETIRAcetam  IVPB 1500 milliGRAM(s) IV Intermittent two times a day  lidocaine   4% Patch 1 Patch Transdermal daily  lurasidone 40 milliGRAM(s) Oral daily  pantoprazole  Injectable 40 milliGRAM(s) IV Push daily  piperacillin/tazobactam IVPB.. 3.375 Gram(s) IV Intermittent every 12 hours  polyethylene glycol 3350 17 Gram(s) Oral daily  propofol Infusion 25 MICROgram(s)/kG/Min (15.5 mL/Hr) IV Continuous <Continuous>  senna Syrup 10 milliLiter(s) Oral at bedtime  sevelamer carbonate Powder 800 milliGRAM(s) Oral every 8 hours    MEDICATIONS  (PRN):  fentaNYL    Injectable 25 MICROGram(s) IV Push every 4 hours PRN Moderate Pain (4 - 6)  nystatin Powder 1 Application(s) Topical three times a day PRN fungal infection you may see and want to treat    Vital Signs Last 24 Hrs  T(C): 37.3 (28 Jan 2025 00:00), Max: 37.3 (27 Jan 2025 20:00)  T(F): 99.1 (28 Jan 2025 00:00), Max: 99.1 (27 Jan 2025 20:00)  HR: 75 (28 Jan 2025 04:00) (70 - 90)  BP: 110/59 (28 Jan 2025 04:00) (104/65 - 163/79)  BP(mean): 82 (28 Jan 2025 04:00) (78 - 113)  RR: 24 (28 Jan 2025 04:00) (14 - 27)  SpO2: 98% (28 Jan 2025 04:00) (98% - 100%)    Parameters below as of 27 Jan 2025 23:57  Patient On (Oxygen Delivery Method): ventilator    Physical Exam:  Gen: Intubated, sedated  HEENT: intubated  Chest: equal chest rise  Cardiac: +S1 S2  Abd: non distended   Neuro: sedated    Labs:                        9.7    6.50  )-----------( 192      ( 28 Jan 2025 00:17 )             30.0     CBC Full  -  ( 28 Jan 2025 00:17 )  WBC Count : 6.50 K/uL  RBC Count : 3.07 M/uL  Hemoglobin : 9.7 g/dL  Hematocrit : 30.0 %  Platelet Count - Automated : 192 K/uL  Mean Cell Volume : 97.7 fl  Mean Cell Hemoglobin : 31.6 pg  Mean Cell Hemoglobin Concentration : 32.3 g/dL  Auto Neutrophil # : 4.88 K/uL  Auto Lymphocyte # : 0.68 K/uL  Auto Monocyte # : 0.54 K/uL  Auto Eosinophil # : 0.14 K/uL  Auto Basophil # : 0.03 K/uL  Auto Neutrophil % : 75.0 %  Auto Lymphocyte % : 10.5 %  Auto Monocyte % : 8.3 %  Auto Eosinophil % : 2.2 %  Auto Basophil % : 0.5 %    01-28    146[H]  |  103  |  113[H]  ----------------------------<  139[H]  3.8   |  18[L]  |  4.80[H]    Ca    9.3      28 Jan 2025 00:17  Phos  9.4     01-28  Mg     3.2     01-28    TPro  6.8  /  Alb  3.1[L]  /  TBili  0.4  /  DBili  x   /  AST  65[H]  /  ALT  66[H]  /  AlkPhos  134[H]  01-28    PT/INR - ( 28 Jan 2025 00:17 )   PT: 10.5 sec;   INR: 0.92 ratio         PTT - ( 28 Jan 2025 00:17 )  PTT:29.7 sec  Bilirubin Total: 0.4 mg/dL (01-28-25 @ 00:17)   [facility]AT[facility] INPATIENT PROGRESS NOTE     Interval Hx:   01-28-25: Mr. Gr was seen at bedside today, continues in MICU intubated, off sedation, Neurology recs noted, L cerebellar lesion not likely be cause of seizure, GOC noted per palliative discussion, pts primary Oncologist aware of current events as well     Meds:   MEDICATIONS  (STANDING):  albuterol/ipratropium for Nebulization 3 milliLiter(s) Nebulizer every 6 hours  atorvastatin 20 milliGRAM(s) Oral at bedtime  bacitracin   Ointment 1 Application(s) Topical two times a day  chlorhexidine 0.12% Liquid 15 milliLiter(s) Oral Mucosa every 12 hours  chlorhexidine 2% Cloths 1 Application(s) Topical <User Schedule>  chlorhexidine 2% Cloths 1 Application(s) Topical <User Schedule>  escitalopram 15 milliGRAM(s) Oral daily  gabapentin Solution 300 milliGRAM(s) Oral two times a day  heparin   Injectable 7500 Unit(s) SubCutaneous every 8 hours  influenza  Vaccine (HIGH DOSE) 0.5 milliLiter(s) IntraMuscular once  lacosamide IVPB 100 milliGRAM(s) IV Intermittent every 12 hours  lamoTRIgine 100 milliGRAM(s) Oral two times a day  levETIRAcetam  IVPB 1500 milliGRAM(s) IV Intermittent two times a day  lidocaine   4% Patch 1 Patch Transdermal daily  lurasidone 40 milliGRAM(s) Oral daily  pantoprazole  Injectable 40 milliGRAM(s) IV Push daily  piperacillin/tazobactam IVPB.. 3.375 Gram(s) IV Intermittent every 12 hours  polyethylene glycol 3350 17 Gram(s) Oral daily  propofol Infusion 25 MICROgram(s)/kG/Min (15.5 mL/Hr) IV Continuous <Continuous>  senna Syrup 10 milliLiter(s) Oral at bedtime  sevelamer carbonate Powder 800 milliGRAM(s) Oral every 8 hours    MEDICATIONS  (PRN):  fentaNYL    Injectable 25 MICROGram(s) IV Push every 4 hours PRN Moderate Pain (4 - 6)  nystatin Powder 1 Application(s) Topical three times a day PRN fungal infection you may see and want to treat    Vital Signs Last 24 Hrs  T(C): 37.3 (28 Jan 2025 00:00), Max: 37.3 (27 Jan 2025 20:00)  T(F): 99.1 (28 Jan 2025 00:00), Max: 99.1 (27 Jan 2025 20:00)  HR: 75 (28 Jan 2025 04:00) (70 - 90)  BP: 110/59 (28 Jan 2025 04:00) (104/65 - 163/79)  BP(mean): 82 (28 Jan 2025 04:00) (78 - 113)  RR: 24 (28 Jan 2025 04:00) (14 - 27)  SpO2: 98% (28 Jan 2025 04:00) (98% - 100%)    Parameters below as of 27 Jan 2025 23:57  Patient On (Oxygen Delivery Method): ventilator    Physical Exam:  Gen: Intubated, sedated  HEENT: intubated  Chest: equal chest rise  Cardiac: +S1 S2  Abd: non distended   Neuro: sedated    Labs:                        9.7    6.50  )-----------( 192      ( 28 Jan 2025 00:17 )             30.0     CBC Full  -  ( 28 Jan 2025 00:17 )  WBC Count : 6.50 K/uL  RBC Count : 3.07 M/uL  Hemoglobin : 9.7 g/dL  Hematocrit : 30.0 %  Platelet Count - Automated : 192 K/uL  Mean Cell Volume : 97.7 fl  Mean Cell Hemoglobin : 31.6 pg  Mean Cell Hemoglobin Concentration : 32.3 g/dL  Auto Neutrophil # : 4.88 K/uL  Auto Lymphocyte # : 0.68 K/uL  Auto Monocyte # : 0.54 K/uL  Auto Eosinophil # : 0.14 K/uL  Auto Basophil # : 0.03 K/uL  Auto Neutrophil % : 75.0 %  Auto Lymphocyte % : 10.5 %  Auto Monocyte % : 8.3 %  Auto Eosinophil % : 2.2 %  Auto Basophil % : 0.5 %    01-28    146[H]  |  103  |  113[H]  ----------------------------<  139[H]  3.8   |  18[L]  |  4.80[H]    Ca    9.3      28 Jan 2025 00:17  Phos  9.4     01-28  Mg     3.2     01-28    TPro  6.8  /  Alb  3.1[L]  /  TBili  0.4  /  DBili  x   /  AST  65[H]  /  ALT  66[H]  /  AlkPhos  134[H]  01-28    PT/INR - ( 28 Jan 2025 00:17 )   PT: 10.5 sec;   INR: 0.92 ratio         PTT - ( 28 Jan 2025 00:17 )  PTT:29.7 sec  Bilirubin Total: 0.4 mg/dL (01-28-25 @ 00:17)

## 2025-01-28 NOTE — PHYSICAL THERAPY INITIAL EVALUATION ADULT - NSPTDISCHREC_GEN_A_CORE
If pt d/c home would require home PT, assist with all mobility, & DME: RW, polyfly w/c, & 3:1 commode./Sub-acute Rehab
DC subacute rehab, MARIUSZ barahona./Sub-acute Rehab

## 2025-01-28 NOTE — PHYSICAL THERAPY INITIAL EVALUATION ADULT - IMPAIRED TRANSFERS: SIT/STAND, REHAB EVAL
decr endurance, +shakiness, near buckling/impaired balance/impaired postural control/decreased strength
impaired balance/decreased strength

## 2025-01-28 NOTE — PHYSICAL THERAPY INITIAL EVALUATION ADULT - PLANNED THERAPY INTERVENTIONS, PT EVAL
balance training/bed mobility training/gait training/strengthening/transfer training
stair neg: GOAL: pt will neg 4 steps 1 HR ind in 4wks./bed mobility training/gait training/strengthening/transfer training

## 2025-01-28 NOTE — PHYSICAL THERAPY INITIAL EVALUATION ADULT - STRENGTHENING, PT EVAL
Normal for race
GOAL: Pt will improve strength to at least a 4+/5 in order to improve safety with functional mobility in 4 weeks
GOAL: pt will improve BLE strength to 3+/5 to improve tx and amb in 4wks.

## 2025-01-28 NOTE — PROGRESS NOTE ADULT - SUBJECTIVE AND OBJECTIVE BOX
[facility]   [phone]  RENAL FOLLOW UP NOTE  --------------------------------------------------------------------------------  Chief Complaint:    24 hour events/subjective:    seen earlier   intubated when seen   on pressure support vent at the time     PAST HISTORY  --------------------------------------------------------------------------------  No significant changes to PMH, PSH, FHx, SHx, unless otherwise noted    ALLERGIES & MEDICATIONS  --------------------------------------------------------------------------------  Allergies    penicillin (Hives)  seasonal allergies (Unknown)    Intolerances    latex (Rash)    Standing Inpatient Medications  albuterol/ipratropium for Nebulization 3 milliLiter(s) Nebulizer every 6 hours  atorvastatin 20 milliGRAM(s) Oral at bedtime  bacitracin   Ointment 1 Application(s) Topical two times a day  chlorhexidine 2% Cloths 1 Application(s) Topical <User Schedule>  chlorhexidine 2% Cloths 1 Application(s) Topical <User Schedule>  dextrose 5%. 1000 milliLiter(s) IV Continuous <Continuous>  escitalopram 15 milliGRAM(s) Oral daily  gabapentin Solution 150 milliGRAM(s) Oral two times a day  heparin   Injectable 7500 Unit(s) SubCutaneous every 8 hours  influenza  Vaccine (HIGH DOSE) 0.5 milliLiter(s) IntraMuscular once  lacosamide IVPB 100 milliGRAM(s) IV Intermittent every 12 hours  lamoTRIgine 100 milliGRAM(s) Oral two times a day  levETIRAcetam  IVPB 750 milliGRAM(s) IV Intermittent two times a day  lidocaine   4% Patch 1 Patch Transdermal daily  lurasidone 40 milliGRAM(s) Oral daily  pantoprazole  Injectable 40 milliGRAM(s) IV Push daily  piperacillin/tazobactam IVPB.. 3.375 Gram(s) IV Intermittent every 12 hours  polyethylene glycol 3350 17 Gram(s) Oral daily  senna Syrup 10 milliLiter(s) Oral at bedtime  sevelamer carbonate Powder 800 milliGRAM(s) Oral every 8 hours    PRN Inpatient Medications  fentaNYL    Injectable 25 MICROGram(s) IV Push every 4 hours PRN  nystatin Powder 1 Application(s) Topical three times a day PRN      REVIEW OF SYSTEMS  --------------------------------------------------------------------------------      VITALS/PHYSICAL EXAM  --------------------------------------------------------------------------------  T(C): 37.2 (01-28-25 @ 16:00), Max: 37.3 (01-28-25 @ 00:00)  HR: 97 (01-28-25 @ 19:00) (72 - 97)  BP: 97/56 (01-28-25 @ 19:00) (97/56 - 154/80)  RR: 23 (01-28-25 @ 19:00) (18 - 29)  SpO2: 99% (01-28-25 @ 19:00) (97% - 100%)  Wt(kg): --        01-27-25 @ 07:01  -  01-28-25 @ 07:00  --------------------------------------------------------  IN: 1035 mL / OUT: 800 mL / NET: 235 mL    01-28-25 @ 07:01  -  01-28-25 @ 21:03  --------------------------------------------------------  IN: 720 mL / OUT: 400 mL / NET: 320 mL      Physical Exam:  	  Gen: intubated  obese   	no jvd  	Pulm: decrease bs  no rales or ronchi or wheezing  	CV: tachy  S1S2; no rub  	Abd: hypoactive bs soft distended  	UE: Warm, no cyanosis  no clubbing,  no edema;   	LE: Warm, no cyanosis  no clubbing, no edema  	Neuro:  intubated     LABS/STUDIES  --------------------------------------------------------------------------------              9.7    6.50  >-----------<  192      [01-28-25 @ 00:17]              30.0     148  |  106  |  114  ----------------------------<  123      [01-28-25 @ 15:25]  3.8   |  21  |  4.86        Ca     9.7     [01-28-25 @ 15:25]      Mg     3.5     [01-28-25 @ 15:25]      Phos  9.4     [01-28-25 @ 15:25]    TPro  7.3  /  Alb  3.7  /  TBili  0.4  /  DBili  x   /  AST  67  /  ALT  72  /  AlkPhos  136  [01-28-25 @ 15:25]    PT/INR: PT 10.5 , INR 0.92       [01-28-25 @ 00:17]  PTT: 29.7       [01-28-25 @ 00:17]      Creatinine Trend:  SCr 4.86 [01-28 @ 15:25]  SCr 4.80 [01-28 @ 00:17]  SCr 4.68 [01-27 @ 00:32]  SCr 4.66 [01-26 @ 12:10]  SCr 4.70 [01-26 @ 00:17]

## 2025-01-28 NOTE — PROGRESS NOTE ADULT - ASSESSMENT
Patient is a 69 year old male with PMH of metastatic testicular cancer (s/p L orchiectomy, on etoposide/cisplatin chemo, last dose 6/2024), epilepsy (grand-mal seizures), schizophrenia, developmental delay, HTN, HLD, VAZQUEZ, stage III CKD, severe obesity, secondary hyperparathyroidism, anemia, thrombocytopenia who presented to ED for fall, possible seizure prior. Patient reported cough for few weeks with no other respiratory symptoms.   Admitted for further work up for syncope, c/f seizure   Influenza A  1/17 s/p RRTs for grand mal seizure activity, s/p intubation and transfer to MICU  - 1/17 CT chest with anterior bowing of posterior tracheal wall suggestive of tracheomalacia, trace R pleural effusion   - MRSA PCR screen negative    - 1/18 sputum culture negative    - s/p extubation 1/19   - 1/20 Bcx negative    - s/p ceftriaxone 1/17-1/21   - s/p tamiflu 1/17-1/21    Sepsis/sirs, AHRF possible flash pulmonary edema iso uncontrolled HTN, c/f aspiration pneumonia   - 1/23 s/p RRT am for hypoxia/inc WOB, febrile last night 100.6F and now 101F this am, tachycardia, leukocytosis 11k   - repeat COVID/Flu/RSV with known influenza A-likely shedding    - reported occ dysuria, UA negative for pyuria   - 1/23 afternoon s/p RRT for inc WOB on AVAPS, s/p intubation and transfer to MICU, required pressor support  - MRSA PCR screen negative   - 1/24 afebrile overnight, WBC uptrending, weaned off pressor earlier in am, on zosyn   - 1/25 afebrile, CXR with R infiltrate, Bcx remain NGTD, WBC down  - 1/25 Scx with rare Pseudomonas aeruginosa -pansensitive   - MRI brain with metastatic disease  - thyroid us with nodule  - 1/26 CT spine with no acute abn of spine, stable postop changes and hardware; noted with multifocal lung abn with bibasilar atelectasis or consolidation and patchy airspace opacities in the RUL  - h/o penicillin allergy noted--tolerating zosyn     s/p cefepime 1/23  s/p zosyn 1/23    Recommendations:   Continue zosyn - complete 7d course tomorrow 1/29  Monitor temps/WBC  Aspiration precautions   Continue rest of care per primary team       Greyson Mart M.D.  [facility]  Available on [employer] TEAMS - *PREFERRED*  [phone]  After 5pm on weekdays and all day on weekends - please call [phone]     Thank you for consulting us and involving us in the management of this patients case. In addition to reviewing history, imaging, documents, labs, microbiology, took into account antibiotic stewardship, local antibiogram and infection control strategies and potential transmission issues.

## 2025-01-28 NOTE — PHYSICAL THERAPY INITIAL EVALUATION ADULT - GENERAL OBSERVATIONS, REHAB EVAL
pt a/w fall after possible seizure, +ICU stay required intubation 1/17 and extubated on 1/19. Cleared for PT eval. Pt received supine in bed, +ICU monitoring, +IVL, +condom cath, +O2 via NC, +eating lunch. Pt is A&OX4, follows commands, willing to participate.
Chart reviewed. Pt jeffy 45 mins PT eval; recd semi supine in bed, NAD, VSS,

## 2025-01-28 NOTE — AIRWAY REMOVAL NOTE  ADULT & PEDS - RESPIRATORY EXPANSION/ACCESSORY MUSCLES/RETRACTIONS
no use of accessory muscles/no retractions/expansion symmetric
suprasternal retractions/no use of accessory muscles/no retractions/expansion symmetric

## 2025-01-28 NOTE — PROGRESS NOTE ADULT - PROBLEM SELECTOR PLAN 1
-S/p intubation in MICU in the setting of grand mal seizures, pneumonia, Flu  -Completed course of ABX, tamiflu for Flu. Extubated to AVAPS 1/19  -S/p RRT 1/23 AM for fevers, hypoxia requiring AVAPS  -Pt hypertensive, concern for flash pulm edema  -WBC uptrending, febrile to 101F during RRT, ? aspiration event   -S/p 2nd RRT 1/23 PM for increased WOB on AVAPS, intubated and tx to MICU   -Keep O>I as tolerated  -Continue ABX  -Continue bronchodilators  -Keep sats >90%, pH >7.2.  -remains on full vent support:  ct chest showed only 4 mm nodules:   -MRI with some patchy opacities in rigth upper lung zone: cont antibiotics  on cpap trials today  : alert and bella cont ggressive weaning trials: on zosyn  for aspiration precautions  1/28:o: extubated today  : resp status still tenuous:  monitor : ABG good:

## 2025-01-28 NOTE — PHYSICAL THERAPY INITIAL EVALUATION ADULT - ACTIVE RANGE OF MOTION EXAMINATION, REHAB EVAL
bilateral upper extremity Active ROM was WFL (within functional limits)/bilateral  lower extremity Active ROM was WFL (within functional limits)
decr b/l knee extension/Left UE Active ROM was WFL (within functional limits)/Right UE Active ROM was WFL (within functional limits)/Left LE Active ROM was WFL (within functional limits)/Right LE Active ROM was WFL (within functional limits)

## 2025-01-28 NOTE — PROGRESS NOTE ADULT - ASSESSMENT
Mr. Gr is a 69 year old male with PMHx of seizure disorder, sleep apnea, HTN, chronic idiopathic constipation, stage III CKD, severe obesity, secondary hyperparathyroidism, anemia, thrombocytopenia, hyperlipidemia, testicular cancer s/p EP x 4 under the care of Dr. Ovalles who is admitted s/p fall in setting of possible seizure since 01/16/2025. He was intubated early in his admission due to seziures and extubated 1/21/2025 and then again intubated in setting of respiratory distress, requiring diuresis, with SHAGUFTA on CKD, in the MICU. Imaging shows possible brain metastatic disease and lung mass as well as abnormal thyroid nodule.     Former smoker, quit 14y prior, denied ETOH, drug use. Lives at home. Does not have children. Denied family history of blood disorders or malignancy.  Denied previous workplace related exposures.  Denied previous history of blood transfusions.  Denied history of thromboses.  Denied history of  requiring anticoagulation.        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  Margins were negative.  pT3Nx. CT C/A/P with few left para-aortic and left iliac chain lymph nodes largest measuring 8.1 cm left para-aortic node, bilateral subcentimeter noncalcified pulmonary nodules measuring up to 7 mm. AFP, LDH, hCG were all elevated. 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 Completed four cycles of adjuvant therapy with Cisplatin+Etoposide, with cisplatin dose reduced 50% and Etoposide 50% due to thrombocytopenia at that time. Subsequent scans 08/2024 showed resolution of disease and negative markers,         01/26/2025: continues intubated and sedated in the ICU, discussed with ICU team yesterday, pending markers, endocrinology eval noted     01/27/2025:  AFP/BHCG normal,       # Brain lesion  # Seizures  -Seizures noted, pt with hx of seizures as well  - Neurology following  - MRI brain now with 5.4 mm enhancing lesion in the LEFT middle cerebellar peduncle with associated edema suspicious for metastases  - MRI Lumbar negative for acute disease  - Small lesion without mass effect or edema, recommend to correlate per neurology if foci and locus are concordant with seizure activity  - It is rare, but nonetheless not impossible, for testicular cancer to be metastatic to the brain  - He will need another PET-CT, can be completed outpatient once stable if concern can proceed with biopsy at that time   - Endocrinology eval for thyroid nodule  - AFP/BHCG normal,         # Thyroid nodule  - US Thyroid 01/24/2025 with 1.6cm complex mixed solid cystic hypoechoic nodule in the lower pole of the right thyroid lobe, TIRADS 5.  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 vOalles 12/03/2024, markers continued to be negative  - See above in regards to brain lesion  - MRI Neck shows 4.2 cm RIGHT upper lobe mass/infiltrate  - Recommend IR guided biopsy of RUL mass, currently intubated and sedate likely not possible, PET-CT as outpt and then consideration after better characterization   - Follow up as outpatient with Franki Ovalles MD     # Thrombocytopenia  - Labile  - Coags wnl, LFTs elevated  - Continue to trend  - Transfuse to maintain >10k, if actively bleeding then maintain >50k     # Acute kidney injury on CKD Stage IIIA  - Worsening    - Likely multifactorial at this point  - Nephrology consult and recs noted  - Continue to trend     # Leukocytosis, Neutrophilia  -Likely reactive  -Continue to trend     # Normocytic anemia  - Chronic, has hx of PRBC during chemo 07/2024  - Noted Anti E, Anti-K Anti-C antibodies previously  - Monitor for bleeding  - Recommend to transfuse to maintain Hb > 7        Thank you for allowing me to participate in the care Mr. Gr, please do not hesitate to call or text me if you have further questions or concerns.        Brayan Mart MD  [facility] [place]   Division of Hematology/Oncology  [address], [address]  [place] [address]  P: [phone]  F: [phone]    Attestation:    ----Pt evalulated including face-to-face interaction in addition to chart review, reviewing treatment plan, and managing the patient’s chronic diagnoses as listed in the assessment----   Mr. Gr is a 69 year old male with PMHx of seizure disorder, sleep apnea, HTN, chronic idiopathic constipation, stage III CKD, severe obesity, secondary hyperparathyroidism, anemia, thrombocytopenia, hyperlipidemia, testicular cancer s/p EP x 4 under the care of Dr. Ovalles who is admitted s/p fall in setting of possible seizure since 01/16/2025. He was intubated early in his admission due to seziures and extubated 1/21/2025 and then again intubated in setting of respiratory distress, requiring diuresis, with SHAGUFTA on CKD, in the MICU. Imaging shows possible brain metastatic disease and lung mass as well as abnormal thyroid nodule.     Former smoker, quit 14y prior, denied ETOH, drug use. Lives at home. Does not have children. Denied family history of blood disorders or malignancy.  Denied previous workplace related exposures.  Denied previous history of blood transfusions.  Denied history of thromboses.  Denied history of  requiring anticoagulation.        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  Margins were negative.  pT3Nx. CT C/A/P with few left para-aortic and left iliac chain lymph nodes largest measuring 8.1 cm left para-aortic node, bilateral subcentimeter noncalcified pulmonary nodules measuring up to 7 mm. AFP, LDH, hCG were all elevated. 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 Completed four cycles of adjuvant therapy with Cisplatin+Etoposide, with cisplatin dose reduced 50% and Etoposide 50% due to thrombocytopenia at that time. Subsequent scans 08/2024 showed resolution of disease and negative markers,         01/26/2025: continues intubated and sedated in the ICU, discussed with ICU team yesterday, pending markers, endocrinology eval noted     01/27/2025:  AFP/BHCG normal,     01/28/2025: continues in MICU intubated, off sedation, Neurology recs noted, L cerebellar lesion not likely be cause of seizure, GOC noted per palliative discussion, pts primary Oncologist aware of current events as well         # Brain lesion  # Seizures  - Seizures noted, pt with hx of seizures  - MRI brain now with 5.4 mm enhancing lesion in the LEFT middle cerebellar peduncle with associated edema suspicious for metastases  - MRI Lumbar negative for acute disease  - Small lesion without mass effect or edema, recommended to correlate per neurology if foci and locus are concordant with seizure activity  - Neurology recs noted, new lesion not likely to cause seizure  - It is rare, but nonetheless not impossible, for testicular cancer to be metastatic to the brain  - He will need another PET-CT, can be completed outpatient once stable if concern can proceed with biopsy at that time   - Endocrinology eval for thyroid nodule  - AFP/BHCG normal,         # Thyroid nodule  - US Thyroid 01/24/2025 with 1.6cm complex mixed solid cystic hypoechoic nodule in the lower pole of the right thyroid lobe, TIRADS 5.  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 Ovalles 12/03/2024, markers continued to be negative  - See above in regards to brain lesion  - MRI Neck shows 4.2 cm RIGHT upper lobe mass/infiltrate  - Recommend IR guided biopsy of RUL mass, currently intubated and sedate likely not possible, PET-CT as outpt and then consideration after better characterization   - Follow up as outpatient with Franki Ovalles MD     # Thrombocytopenia  - Labile  - Coags wnl, LFTs elevated  - Continue to trend  - Transfuse to maintain >10k, if actively bleeding then maintain >50k     # Acute kidney injury on CKD Stage IIIA  - Worsening    - Likely multifactorial at this point  - Nephrology consult and recs noted  - Continue to trend     # Leukocytosis, Neutrophilia  - Likely reactive, improved  - Continue to trend     # Normocytic anemia  - Chronic, has hx of PRBC during chemo 07/2024  - Noted Anti E, Anti-K Anti-C antibodies previously  - Monitor for bleeding  - Recommend to transfuse to maintain Hb > 7        Thank you for allowing me to participate in the care Mr. Gr, please do not hesitate to call or text me if you have further questions or concerns.        Brayan Mart MD  [facility] [place]   Division of Hematology/Oncology  [address], [address]  [place] [address]  P: [phone]  F: [phone]    Attestation:    ----Pt evalulated including face-to-face interaction in addition to chart review, reviewing treatment plan, and managing the patient’s chronic diagnoses as listed in the assessment----

## 2025-01-28 NOTE — CHART NOTE - NSCHARTNOTEFT_GEN_A_CORE
NUTRITION FOLLOW UP NOTE     PATIENT SEEN FOR: Enteral Nutrition Follow Up    SOURCE: [] Patient  [x] Current Medical Record  [] RN  [] Family/support person at bedside  [X] Patient unavailable/inappropriate  [X] Other: Interdisciplinary rounds.    CHART REVIEWED/EVENTS NOTED.  [] No changes to nutrition care plan to note  [X] Nutrition Status:  - Seizure   - Flu   - Extubated   -  Transferred from MICU to Medicine on    - Seen by SLP  and performed FEES study, recommend " soft bite sized/mildly thick liquids"   -  2 Rapid responses, intubated, sedated and mechanically ventilated.   -  Transferred to MICU  - - off Propofol, plan for possible extubation     DIET ORDER:   Diet, NPO with Tube Feed:   Tube Feeding Modality: Orogastric  Vital High Protein (VITALHP)  Total Volume for 24 Hours (mL): 1260  Continuous  Starting Tube Feed Rate {mL per Hour}: 10  Increase Tube Feed Rate by (mL): 5     Every 6 hours  Until Goal Tube Feed Rate (mL per Hour): 70  Tube Feed Duration (in Hours): 18  Tube Feed Start Time: 11:00  Tube Feed Stop Time: 05:00 (25 @ 10:41) [Active]      CURRENT DIET ORDER IS:  [] Appropriate:  [X] Inadequate:  [X] Other: See below for recommendations     NUTRITION INTAKE/PROVISION:  [] PO:  [X] Enteral Nutrition:  ENTERAL NUTRITION Order Provides:   1,260 ml total volume, 1,260 kcals, 109 g protein and 1053 ml free water  Current Pump Rate: off for 18 hour feedings/possible extubation   EN provision: 41% EN volume goal provided in past  4 days (-)  -- Pt as bee meeting </=50% of estimated energy needs for >/=5 days   [] Parenteral Nutrition:    ANTHROPOMETRICS:  Drug Dosing Weight  Height (cm): 167.6 (2025 21:02)  Weight (kg): 103.2 (2025 21:02)  BMI (kg/m2): 36.7 (2025 21:02)    Daily Weight in k.3 (), Weight in k.3 (), Weight in k.2 (), Admission weight 103.2 kg ()   - Weight fluctuations likely in setting of fluid losses with edema     Nutrition Focused Physical Examination:  Conducted with patient's RN present in pt's assigned room  Muscle Loss  [x] Temples - Moderate      MEDICATIONS  (STANDING):  albuterol/ipratropium for Nebulization 3 milliLiter(s) Nebulizer every 6 hours  atorvastatin 20 milliGRAM(s) Oral at bedtime  bacitracin   Ointment 1 Application(s) Topical two times a day  escitalopram 15 milliGRAM(s) Oral daily  gabapentin Solution 300 milliGRAM(s) Oral two times a day  heparin   Injectable 7500 Unit(s) SubCutaneous every 8 hours  lacosamide IVPB 100 milliGRAM(s) IV Intermittent every 12 hours  lamoTRIgine 100 milliGRAM(s) Oral two times a day  levETIRAcetam  IVPB 1500 milliGRAM(s) IV Intermittent two times a day  lurasidone 40 milliGRAM(s) Oral daily  pantoprazole  Injectable 40 milliGRAM(s) IV Push daily  piperacillin/tazobactam IVPB.. 3.375 Gram(s) IV Intermittent every 12 hours  polyethylene glycol 3350 17 Gram(s) Oral daily  senna Syrup 10 milliLiter(s) Oral at bedtime  sevelamer carbonate Powder 800 milliGRAM(s) Oral every 8 hours    MEDICATIONS  (PRN):  fentaNYL    Injectable 25 MICROGram(s) IV Push every 4 hours PRN Moderate Pain (4 - 6)  nystatin Powder 1 Application(s) Topical three times a day PRN fungal infection you may see and want to treat      NUTRITIONALLY PERTINENT LABS:   @ 00:17: Na 146[H], [H], Cr 4.80[H], [H], K+ 3.8, Phos 9.4[H], Mg 3.2[H], Alk Phos 134[H], ALT/SGPT 66[H], AST/SGOT 65[H], HbA1c --      NUTRITIONALLY PERTINENT MEDICATIONS/LABS:  [x] Reviewed  [X] Relevant notes on medications/labs:  - Hypernatremic  - Hyperphosphatemic; ordered for Renvela powder q8hr     EDEMA:  [x] Reviewed  [X] Relevant notes: +2 Generalized edema per flowsheets.     GI/ I&O:  [x] Reviewed  [X] Relevant notes: last BM 25 per flowsheets, ordered for Miralax and Senna   [] Other:    SKIN:   [X] No pressure injuries documented, per nursing flowsheet  [] Pressure injury previously noted  [] Change in pressure injury documentation:  [] Other:    ESTIMATED NEEDS:  [] No change:  [X] Updated:  Energy: 4183-2662  kcal/day (18-22 kcal/kg)- based on dosing weight of 103.2 kg to account for fluid shifts, edema and BMI> 30  Protein:  96.7-129 g/day (1.5-2.0 g/kg)- based on IBW of 64.5kg to account for BMI > 30   Defer fluids to team   Bob state equation 1783 kcal/day     NUTRITION DIAGNOSIS:  [X] Prior Dx: Inadequate protein energy intake   [X] New Dx:   P: Acute severe malnutrition  E: Decreased ability to consume sufficient protein-energy intake  S: </=50% of estimated energy needs for >/=5 days and moderate temporal muscle wasting     Nutrition Goal: Pt to meet >80% of estimated nutritional needs during hospital stay via tolerated route.     EDUCATION:  [] Yes:  [X] Not appropriate/warranted    NUTRITION CARE PLAN:  1. Diet: As tolerated, recommend change tube feeding to Nepro @ 35 mL/hr and advance by 10mL q4H until goal rate of 65 mL/hr x18hrs is reached. Regimen at goal provides 1170 mL total volume, 851 mL free water, 2106 kcal/d and 95 g pro/day (20 kcal/kg based on dosing weight 103.2 kg and 1.47 g/kg based on ideal body weight  64.5 kg   -- Defer free water flushes to medical team   2. Supplements: None at this time   3. Multivitamin/mineral supplementation:  None at this time   4. RD remains available to adjust enteral nutrition formulary, volume/rate   5. Malnutrition Sticker placed in pt. chart     [] Achieved - Continue current nutrition intervention(s)  [] Current medical condition precludes nutrition intervention at this time.    MONITORING AND EVALUATION:   RD remains available upon request and will follow up per protocol.    Mindy Wilson, MS,RDN,CDN,CNSC AVAILABLE ON [employer] TEAMS

## 2025-01-28 NOTE — PROGRESS NOTE ADULT - SUBJECTIVE AND OBJECTIVE BOX
DATE OF SERVICE: 01-28-25 @ 11:30    Patient is a 69y old  Male who presents with a chief complaint of seizure, flu, elevated trop (28 Jan 2025 07:53)      INTERVAL HISTORY: Intubated.     REVIEW OF SYSTEMS: Unable to participate in ROS  CONSTITUTIONAL: No weakness  EYES/ENT: No visual changes;  No throat pain   NECK: No pain or stiffness  RESPIRATORY: No cough, wheezing; No shortness of breath  CARDIOVASCULAR: No chest pain or palpitations  GASTROINTESTINAL: No abdominal  pain. No nausea, vomiting, or hematemesis  GENITOURINARY: No dysuria, frequency or hematuria  NEUROLOGICAL: No stroke like symptoms  SKIN: No rashes    TELEMETRY Personally reviewed: SR   	  MEDICATIONS:        PHYSICAL EXAM:  T(C): 37.2 (01-28-25 @ 12:00), Max: 37.3 (01-27-25 @ 20:00)  HR: 85 (01-28-25 @ 14:00) (72 - 90)  BP: 117/58 (01-28-25 @ 14:00) (104/65 - 154/80)  RR: 22 (01-28-25 @ 14:00) (18 - 29)  SpO2: 99% (01-28-25 @ 14:00) (98% - 100%)  Wt(kg): --  I&O's Summary    27 Jan 2025 07:01  -  28 Jan 2025 07:00  --------------------------------------------------------  IN: 1035 mL / OUT: 800 mL / NET: 235 mL    28 Jan 2025 07:01  -  28 Jan 2025 14:20  --------------------------------------------------------  IN: 50 mL / OUT: 200 mL / NET: -150 mL          Appearance: In no distress	  HEENT:    PERRL, EOMI	  Cardiovascular:  S1 S2, No JVD  Respiratory: Lungs clear to auscultation	  Gastrointestinal:  Soft, Non-tender, + BS	  Vascularature:  No edema of LE  Psychiatric: Appropriate affect   Neuro: no acute focal deficits                               9.7    6.50  )-----------( 192      ( 28 Jan 2025 00:17 )             30.0     01-28    146[H]  |  103  |  113[H]  ----------------------------<  139[H]  3.8   |  18[L]  |  4.80[H]    Ca    9.3      28 Jan 2025 00:17  Phos  9.4     01-28  Mg     3.2     01-28    TPro  6.8  /  Alb  3.1[L]  /  TBili  0.4  /  DBili  x   /  AST  65[H]  /  ALT  66[H]  /  AlkPhos  134[H]  01-28        Labs personally reviewed      ASSESSMENT/PLAN: 	    69M w/ hx of metastatic testicular cancer (s/p L orchiectomy, on etoposide/cisplatin chemo, last dose 6/2024), epilepsy (grand-mal seizures), schizophrenia, developmental delay, HTN, HLD, VAZQUEZ, stage III CKD, obesity, secondary hyperparathyroidism, anemia, thrombocytopenia, ? spinal surgery who was admitted on 1/16/25 for fall and syncope. On 1/17 patient had multiple RRTs called for grand mal seizure activity and  patient had x2 more episode and was ultimately given ativan 2mg, Vimpat load 200mg, and intubated for airway protection with MICU transfer. Extubated 1/19 - to AVAPS, post extubation with increased secretion, now better; downgraded 1/21; While on medical floors; patient developed respiratory distress; now intubated for suspected aspiration pna vs flash pulmonary edema.     Problem/Plan #1: Acute Hypoxic Respiratory Failure  - Intubated for airway protection i/s/o multiple RRTs for grand mal seizure activity, Extubated 1/19 to AVAPS  - Reintubated 1/23 after hypoxic episode for suspected aspiration pna vs flash pulmonary edema.   - BNP 3949  - CXR shows mild pulm edema on 1/16; CXR 1/25  Bibasilar atelectasis. Mild interstitial edema.. Improved aeration in the right upper lobe  - TTE with preserved EF, no WMA, dilated aortic root  - Was on Bumex IV - stopped on 1/25 due to ^ creatinine 4.31 from 3.92  - Monitor creat  - c/w IV Abx as per primary team  - Monitor strict I&Os, daily weights    Problem/Plan #2: Hypertensive Urgency  - BP now stable off anti-hypertensives. Will slowly re-implement as BP recovers.    Problem/Plan #3: HLD  - c/w atorvastatin 20mg daily    Problem/Plan #4: DVT Ppx  - c/w SQ heparin        Magaly Laird, AG-NP   Gus Andrew DO [facility]  Cardiovascular Medicine  [address] [place] [address], [address]  Available through call or text on [employer] TEAM[employer]  Office: [phone]   Speaking Coherently

## 2025-01-28 NOTE — PHYSICAL THERAPY INITIAL EVALUATION ADULT - TRANSFER TRAINING, PT EVAL
GOAL; pt will complete all transfers ind and rolling walker in 4wks.
GOAL: pt will perform sit<>stand independently with RW in 4 weeks

## 2025-01-28 NOTE — PROGRESS NOTE ADULT - SUBJECTIVE AND OBJECTIVE BOX
Date of Service: 01-28-25 @ 15:36    Patient is a 69y old  Male who presents with a chief complaint of seizure, flu, elevated trop (28 Jan 2025 11:30)      Any change in ROS: pt is extubated:  he seems OK: not in any resp distress:  tries to open eyes upon stimulation:      MEDICATIONS  (STANDING):  albuterol/ipratropium for Nebulization 3 milliLiter(s) Nebulizer every 6 hours  atorvastatin 20 milliGRAM(s) Oral at bedtime  bacitracin   Ointment 1 Application(s) Topical two times a day  chlorhexidine 2% Cloths 1 Application(s) Topical <User Schedule>  chlorhexidine 2% Cloths 1 Application(s) Topical <User Schedule>  escitalopram 15 milliGRAM(s) Oral daily  gabapentin Solution 150 milliGRAM(s) Oral two times a day  heparin   Injectable 7500 Unit(s) SubCutaneous every 8 hours  influenza  Vaccine (HIGH DOSE) 0.5 milliLiter(s) IntraMuscular once  lacosamide IVPB 100 milliGRAM(s) IV Intermittent every 12 hours  lamoTRIgine 100 milliGRAM(s) Oral two times a day  levETIRAcetam  IVPB 750 milliGRAM(s) IV Intermittent two times a day  lidocaine   4% Patch 1 Patch Transdermal daily  lurasidone 40 milliGRAM(s) Oral daily  pantoprazole  Injectable 40 milliGRAM(s) IV Push daily  piperacillin/tazobactam IVPB.. 3.375 Gram(s) IV Intermittent every 12 hours  polyethylene glycol 3350 17 Gram(s) Oral daily  senna Syrup 10 milliLiter(s) Oral at bedtime  sevelamer carbonate Powder 800 milliGRAM(s) Oral every 8 hours    MEDICATIONS  (PRN):  fentaNYL    Injectable 25 MICROGram(s) IV Push every 4 hours PRN Moderate Pain (4 - 6)  nystatin Powder 1 Application(s) Topical three times a day PRN fungal infection you may see and want to treat    Vital Signs Last 24 Hrs  T(C): 37.2 (28 Jan 2025 12:00), Max: 37.3 (27 Jan 2025 20:00)  T(F): 99 (28 Jan 2025 12:00), Max: 99.1 (27 Jan 2025 20:00)  HR: 86 (28 Jan 2025 15:00) (72 - 90)  BP: 116/59 (28 Jan 2025 15:00) (104/65 - 154/80)  BP(mean): 79 (28 Jan 2025 15:00) (77 - 107)  RR: 21 (28 Jan 2025 15:00) (18 - 29)  SpO2: 97% (28 Jan 2025 15:00) (97% - 100%)    Parameters below as of 28 Jan 2025 13:03    O2 Flow (L/min): 8  O2 Concentration (%): 30  Mode: CPAP with PS  FiO2: 30  PEEP: 5  PS: 5  MAP: 8  PIP: 12    I&O's Summary    27 Jan 2025 07:01  -  28 Jan 2025 07:00  --------------------------------------------------------  IN: 1035 mL / OUT: 800 mL / NET: 235 mL    28 Jan 2025 07:01  -  28 Jan 2025 15:36  --------------------------------------------------------  IN: 50 mL / OUT: 200 mL / NET: -150 mL          Physical Exam:   GENERAL: NAD, well-groomed, well-developed  HEENT: BREE/   Atraumatic, Normocephalic  ENMT: No tonsillar erythema, exudates, or enlargement; Moist mucous membranes, Good dentition, No lesions  NECK: Supple, No JVD, Normal thyroid  CHEST/LUNG: Clear to auscultaion-no wheezng  CVS: Regular rate and rhythm; No murmurs, rubs, or gallops  GI: : Soft, Nontender, Nondistended; Bowel sounds present  NERVOUS SYSTEM:  sleepy:   EXTREMITIES: - edema  LYMPH: No lymphadenopathy noted  SKIN: No rashes or lesions  ENDOCRINOLOGY: No Thyromegaly  PSYCH:calm     Labs:  ABG - ( 28 Jan 2025 00:14 )  pH, Arterial: 7.44  pH, Blood: x     /  pCO2: 33    /  pO2: 102   / HCO3: 22    / Base Excess: -1.3  /  SaO2: 97.2            21.0, 30.0, 30.0, 100                            9.7    6.50  )-----------( 192      ( 28 Jan 2025 00:17 )             30.0                         10.0   6.98  )-----------( 184      ( 27 Jan 2025 00:32 )             30.8                         8.8    9.04  )-----------( 166      ( 26 Jan 2025 00:16 )             26.6                         10.6   12.77 )-----------( 162      ( 25 Jan 2025 01:18 )             33.1     01-28    146[H]  |  103  |  113[H]  ----------------------------<  139[H]  3.8   |  18[L]  |  4.80[H]  01-27    143  |  98  |  92[H]  ----------------------------<  139[H]  3.6   |  20[L]  |  4.68[H]  01-26    142  |  100  |  97[H]  ----------------------------<  130[H]  3.8   |  19[L]  |  4.66[H]  01-26    141  |  99  |  91[H]  ----------------------------<  126[H]  3.6   |  20[L]  |  4.70[H]  01-25    139  |  99  |  82[H]  ----------------------------<  110[H]  3.5   |  18[L]  |  4.76[H]  01-25    139  |  97  |  81[H]  ----------------------------<  107[H]  3.7   |  18[L]  |  4.70[H]  01-25    139  |  98  |  74[H]  ----------------------------<  132[H]  4.0   |  17[L]  |  4.31[H]  01-24    136  |  100  |  64[H]  ----------------------------<  141[H]  3.9   |  18[L]  |  3.92[H]    Ca    9.3      28 Jan 2025 00:17  Ca    9.5      27 Jan 2025 00:32  Phos  9.4     01-28  Phos  8.9     01-27  Mg     3.2     01-28  Mg     3.0     01-27    TPro  6.8  /  Alb  3.1[L]  /  TBili  0.4  /  DBili  x   /  AST  65[H]  /  ALT  66[H]  /  AlkPhos  134[H]  01-28  TPro  6.9  /  Alb  3.3  /  TBili  0.5  /  DBili  x   /  AST  87[H]  /  ALT  92[H]  /  AlkPhos  155[H]  01-27  TPro  7.0  /  Alb  3.2[L]  /  TBili  0.6  /  DBili  x   /  AST  83[H]  /  ALT  96[H]  /  AlkPhos  154[H]  01-26  TPro  6.7  /  Alb  3.2[L]  /  TBili  0.5  /  DBili  x   /  AST  99[H]  /  ALT  103[H]  /  AlkPhos  157[H]  01-26  TPro  6.5  /  Alb  3.2[L]  /  TBili  0.4  /  DBili  x   /  AST  91[H]  /  ALT  102[H]  /  AlkPhos  143[H]  01-25  TPro  6.2  /  Alb  2.9[L]  /  TBili  0.5  /  DBili  x   /  AST  103[H]  /  ALT  105[H]  /  AlkPhos  141[H]  01-25    CAPILLARY BLOOD GLUCOSE      POCT Blood Glucose.: 126 mg/dL (28 Jan 2025 11:45)  POCT Blood Glucose.: 149 mg/dL (28 Jan 2025 04:58)  POCT Blood Glucose.: 152 mg/dL (27 Jan 2025 17:25)      LIVER FUNCTIONS - ( 28 Jan 2025 00:17 )  Alb: 3.1 g/dL / Pro: 6.8 g/dL / ALK PHOS: 134 U/L / ALT: 66 U/L / AST: 65 U/L / GGT: x           PT/INR - ( 28 Jan 2025 00:17 )   PT: 10.5 sec;   INR: 0.92 ratio         PTT - ( 28 Jan 2025 00:17 )  PTT:29.7 sec  Urinalysis Basic - ( 28 Jan 2025 00:17 )    Color: x / Appearance: x / SG: x / pH: x  Gluc: 139 mg/dL / Ketone: x  / Bili: x / Urobili: x   Blood: x / Protein: x / Nitrite: x   Leuk Esterase: x / RBC: x / WBC x   Sq Epi: x / Non Sq Epi: x / Bacteria: x            RECENT CULTURES:  01-25 @ 07:21 .Sputum Sputum   MARIELENA    Numerous polymorphonuclear leukocytes per low power field  Rare Squamous epithelial cells per low power field  No organisms seen    Pseudomonas aeruginosa  Pseudomonas aeruginosa     Rare Pseudomonas aeruginosa  Commensal lucia consistent with body site    01-24 @ 17:16 Catheterized Catheterized            rad< from: Xray Chest 1 View- PORTABLE-Urgent (Xray Chest 1 View- PORTABLE-Urgent .) (01.25.25 @ 15:46) >    ACC: [id number] EXAM:  XR CHEST PORTABLE URGENT 1V   ORDERED BY: TICO WATSON     PROCEDURE DATE:  01/25/2025          INTERPRETATION:  HISTORY: Admitting Dxs: R55 FALL;  fluid status;  TECHNIQUE: Portable frontal view of the chest, 1 view.  COMPARISON: 1/23/2025.  FINDINGS/  IMPRESSION:  There is a Port-a-Cath in the right chest with the catheter tip in the   SVC right atrial junction. The nasogastric tube courses below the left   hemidiaphragm, tip off edge of film. The endotracheal tube tip is   obscured by the hardware in the spine. HEART: Enlarged.  LUNGS: Bibasilar atelectasis. Mild interstitial edema.. Improved aeration   in the right upper lobe  BONES: Hardware in the thoracolumbar spine    --- End of Report ---            REHANA BOWDEN; Attending Interventional Radiologist  This document has been electronically signed. Jan 26 2025 10:19AM    < end of copied text >      No growth    01-23 @ 15:14 .Blood BLOOD                No growth at 4 days    01-23 @ 12:21 .Blood BLOOD                No growth at 4 days    01-23 @ 00:15 .Blood BLOOD                No growth at 5 days    01-23 @ 00:10 .Blood BLOOD                No growth at 5 days          RESPIRATORY CULTURES:          Studies  Chest X-RAY  CT SCAN Chest   Venous Dopplers: LE:   CT Abdomen  Others

## 2025-01-28 NOTE — PHYSICAL THERAPY INITIAL EVALUATION ADULT - GAIT TRAINING, PT EVAL
GOAL: pt will amb 150'+ with least restrictive device ind in 4wks.
GOAL: pt will amb 200ft independently with RW in 4 weeks

## 2025-01-28 NOTE — PROCEDURE NOTE - ADDITIONAL PROCEDURE DETAILS
69M PMHx metastatic testicular Ca s/p Lt orchiectomy, sz activity, schizophrenia, developmental delay, HTN, HLD, VAZQUEZ, CKD3 obesity, secondary hyperparathyroidism, anemia, thrombocytopenia, admitted secondary grand mal sz activity requiring intubation, extubated 1/28/25. THERON feed tube placed for tube feeds

## 2025-01-28 NOTE — PROGRESS NOTE ADULT - ASSESSMENT
69M w/ hx of metastatic testicular cancer (s/p L orchiectomy, on etoposide/cisplatin chemo, last dose 6/2024), epilepsy (grand-mal seizures), schizophrenia, developmental delay, HTN, HLD, VAZQUEZ, stage III CKD, obesity, secondary hyperparathyroidism, anemia, thrombocytopenia, ? spinal surgery who was admitted on 1/16/25 for fall and syncope. On 1/17 patient had multiple RRTs called for grand mal seizure activity and  patient had x2 more episode and was ultimately given ativan 2mg, Vimpat load 200mg, and intubated for airway protection with MICU transfer. Extubated 1/19 - to AVAPS, post extubation with increased secretion, now better; downgraded 1/21; While on medical floors; patient developed respiratory distress; now intubated for suspected aspiration pna vs flash pulmonary edema with new fever and leukocytosis.      CHANGES/UPDATES 1/27/25  - Sputum culture available: Pseudomonas, sensitive to Zosyn      ===NEURO===  #Intubated   #Sedated    - Sedation: Propofol, wean as able  - Pain: PRN fentanyl 25mcg q4h, PRN Lidocaine patch 4%  - SATs as able       #Status Epilepticus  #Grand Mal Seizures  #?MRI with mets vs abnl lesion    - AEDs: Keppra 1.5g BID, Lamotrigine 100mg BID, Gabapentin 300mg BID, Vimpat 100mg BID  - S/p Ativan for seizures  - s/p vEEG negative for seizures  - s/p CTH negative for acute findings  - Neuro signed off 1/27/25  - Onc as below       #Schizophrenia  #Depression    - Escitalopram 15mg daily  - Lurasidone 40mg daily      ===CVS===  #HTN #HLD  #?Flash Pulmonary Edema  #Normal EF (62%)    - Atorvastatin 20mg qhs  - GOAL: Net negative, Normotension       #Hypotension    - s/p Norepi prn    ===PULM===  #Respiratory Failure s/t ?Edema vs PNA  #VAZQUEZ on CPAP at home-->?AVAPS  #Suspected aspiration PNA    - Duonebs 3mL q4h  - S/p 3% saline Nebs 4mL BID (D/C’ed 1/27/25)  - Antibiotics as below  - Cont Vent, daily SBTs as able, wean as able  - S/p Intubation 1/17 for status epilepticus ? S/p Extubation 1/29 to AVAPS ? re-intubated for suspected aspiration PNA     ===RENAL===  #Metabolic Acidosis  #SHAGUFTA on CKD Stage III  #Electrolytes    - Monitor BP and urine output, if concerning may need fluids  - s/p bumex 4mg gtt with good effect  - sevelamer and rasburicase for phos/uric acid  - Nephro Dr. Dao Morris following.    ===GI===  #Diet #OG Tube  #Aspiration risk  #Bowel Regimen    - Tube feeds  - Pantoprazole 40mg daily  - Senna, Miralax         ===ID===  #?Aspiration Pneumonia  #Pneumonia RLL, CAP, treated  #FluA+, treated    - Zosyn 3.375g BID (1/23-?1/29) empirically, if rash start cefepime  - BCx: 1/23(NGTD), 1/19 (NGTD 4d)  - Sputum Cx: 1/25 pseudomonas, sensitive to Zosyn. 1/23 (No sufficient sputum), 1/18 (neg)  - Labs: MRSA (1/23 neg), Legionella (1/24 neg), Strep Ag (1/24 neg)  - S/p Ceftriaxone 1g daily (1/17-1/21) for CAP  - S/p Tamiflu 30mg BID (1/18-1/21) for FluA         ===ENDO===  #At risk of hypoglycemia  #Hx of secondary hyperparathyroidism  #Thyroid nodules    - FS q6h while NPO  - If more stable, consider thyroid nodule biopsy inpatient vs outpatient  - Endo signed off 1/27/25         ===HEME/ONC===  #Metastatic testicular cancer  #Last chemo 06/2024 (etoposide/cisplatin)  #S/p L. Orchiectomy  #New Neck Nodule: US/MRI with concern for thyroid cancer TIRADS 5  #New Lung Nodule: 4cm on MRI and additional nodule on CT 4mm  #New Brain Enhancing Lesion: 5.4mm lesion in white matter near ventricles    - Dr. Franki Ovalles ([facility]) aware (contacted for possible MRI showing mets to brain, lung, thyroid), will have colleague round  - Awaiting recommendations       #DVT ppx  - Heparin SQ 7500U       ===ETHICS===  #Code Status: FULL CODE.    - Palliative consulted, following   69M w/ hx of metastatic testicular cancer (s/p L orchiectomy, on etoposide/cisplatin chemo, last dose 6/2024), epilepsy (grand-mal seizures), schizophrenia, developmental delay, HTN, HLD, VAZQUEZ, stage III CKD, obesity, secondary hyperparathyroidism, anemia, thrombocytopenia, ? spinal surgery who was admitted on 1/16/25 for fall and syncope. On 1/17 patient had multiple RRTs called for grand mal seizure activity and  patient had x2 more episode and was ultimately given ativan 2mg, Vimpat load 200mg, and intubated for airway protection with MICU transfer. Extubated 1/19 - to AVAPS, post extubation with increased secretion, now better; downgraded 1/21; While on medical floors; patient developed respiratory distress; now intubated for suspected aspiration pna vs flash pulmonary edema with new fever and leukocytosis.      CHANGES/UPDATES 1/27/25  - Sputum culture available: Pseudomonas, sensitive to Zosyn      ===NEURO===  #Intubated     - Off sedation  - Pain: PRN fentanyl 25mcg q4h, PRN Lidocaine patch 4%  - SATs as able  - Plan extubate       #Status Epilepticus  #Grand Mal Seizures  #?MRI with mets vs abnl lesion    - AEDs: Keppra 1.5g BID, Lamotrigine 100mg BID, Gabapentin 300mg BID, Vimpat 100mg BID  - S/p Ativan for seizures  - s/p vEEG negative for seizures  - s/p CTH negative for acute findings  - Neuro signed off 1/27/25  - Onc as below       #Schizophrenia  #Depression    - Escitalopram 15mg daily  - Lurasidone 40mg daily      ===CVS===  #HTN #HLD  #?Flash Pulmonary Edema  #Normal EF (62%)    - Atorvastatin 20mg qhs  - GOAL: Net negative, Normotension       #Hypotension    - s/p Norepi prn    ===PULM===  #Respiratory Failure s/t ?Edema vs PNA  #VAZQUEZ on CPAP at home-->?AVAPS  #Suspected aspiration PNA    - Duonebs 3mL q4h  - S/p 3% saline Nebs 4mL BID (D/C’ed 1/27/25)  - Antibiotics as below  - Cont Vent, daily SBTs as able, wean as able  - S/p Intubation 1/17 for status epilepticus ? S/p Extubation 1/29 to AVAPS ? re-intubated for suspected aspiration PNA     ===RENAL===  #Metabolic Acidosis  #SHAGUFTA on CKD Stage III  #Electrolytes    - Monitor BP and urine output, if concerning may need fluids  - s/p bumex 4mg gtt with good effect  - sevelamer and rasburicase for phos/uric acid  - Nephro Dr. Dao Morris following.    ===GI===  #Diet #OG Tube  #Aspiration risk  #Bowel Regimen    - Tube feeds  - Pantoprazole 40mg daily  - Senna, Miralax         ===ID===  #?Aspiration Pneumonia  #Pneumonia RLL, CAP, treated  #FluA+, treated    - Zosyn 3.375g BID (1/23-?1/29) empirically, if rash start cefepime  - BCx: 1/23(NGTD), 1/19 (NGTD 4d)  - Sputum Cx: 1/25 pseudomonas, sensitive to Zosyn. 1/23 (No sufficient sputum), 1/18 (neg)  - Labs: MRSA (1/23 neg), Legionella (1/24 neg), Strep Ag (1/24 neg)  - S/p Ceftriaxone 1g daily (1/17-1/21) for CAP  - S/p Tamiflu 30mg BID (1/18-1/21) for FluA         ===ENDO===  #At risk of hypoglycemia  #Hx of secondary hyperparathyroidism  #Thyroid nodules    - FS q6h while NPO  - If more stable, consider thyroid nodule biopsy inpatient vs outpatient  - Endo signed off 1/27/25         ===HEME/ONC===  #Metastatic testicular cancer  #Last chemo 06/2024 (etoposide/cisplatin)  #S/p L. Orchiectomy  #New Neck Nodule: US/MRI with concern for thyroid cancer TIRADS 5  #New Lung Nodule: 4cm on MRI and additional nodule on CT 4mm  #New Brain Enhancing Lesion: 5.4mm lesion in white matter near ventricles    - Dr. Franki Ovalles ([facility]) aware (contacted for possible MRI showing mets to brain, lung, thyroid), will have colleague round  - Awaiting recommendations       #DVT ppx  - Heparin SQ 7500U       ===ETHICS===  #Code Status: FULL CODE.    - Palliative consulted, following

## 2025-01-28 NOTE — PROGRESS NOTE ADULT - SUBJECTIVE AND OBJECTIVE BOX
***************************************************************  Donaldjustinbella Benz, PGY-1  on TEAMS  ***************************************************************    OSCAR MILAN ([id number])- Patient is a 69y old  Male who presents with a chief complaint of seizure, flu, elevated trop (27 Jan 2025 04:55)      INTERVAL/OVERNIGHT EVENTS/SUBJECTIVE:   No acute events overnight.     Pt seen at bedside.    PHYSICAL EXAM:  General: Intubated, sedated  HEENT: Normocephalic. Atraumatic. Normal icterus. MMM  Heart: Regular rate, rhythm  Lungs: faint crackles, mild wheeze, no distress.  GI: Soft, distended/obese, nontender  Neuro: Sedated, pupils appropriate  Ext: Trace edema bilaterally.   Skin: Warm, well-perfused     Vital Signs Last 24 Hrs  T(C): 37.2 (28 Jan 2025 04:00), Max: 37.3 (27 Jan 2025 20:00)  T(F): 99 (28 Jan 2025 04:00), Max: 99.1 (27 Jan 2025 20:00)  HR: 85 (28 Jan 2025 06:40) (70 - 90)  BP: 121/63 (28 Jan 2025 05:00) (104/65 - 163/79)  BP(mean): 86 (28 Jan 2025 05:00) (78 - 113)  RR: 24 (28 Jan 2025 05:00) (14 - 27)  SpO2: 99% (28 Jan 2025 06:40) (98% - 100%)    Parameters below as of 28 Jan 2025 05:52  Patient On (Oxygen Delivery Method): ventilator        LABS (available at time of writing 01-28-25 @ 07:28):                         9.7    6.50  )-----------( 192      ( 28 Jan 2025 00:17 )             30.0     01-28    146[H]  |  103  |  113[H]  ----------------------------<  139[H]  3.8   |  18[L]  |  4.80[H]    Ca    9.3      28 Jan 2025 00:17  Phos  9.4     01-28  Mg     3.2     01-28    TPro  6.8  /  Alb  3.1[L]  /  TBili  0.4  /  DBili  x   /  AST  65[H]  /  ALT  66[H]  /  AlkPhos  134[H]  01-28    ABG - ( 28 Jan 2025 00:14 )  pH, Arterial: 7.44  pH, Blood: x     /  pCO2: 33    /  pO2: 102   / HCO3: 22    / Base Excess: -1.3  /  SaO2: 97.2              Lactate Trend    PT/INR - ( 28 Jan 2025 00:17 )   PT: 10.5 sec;   INR: 0.92 ratio         PTT - ( 28 Jan 2025 00:17 )  PTT:29.7 sec  Urinalysis Basic - ( 28 Jan 2025 00:17 )    Color: x / Appearance: x / SG: x / pH: x  Gluc: 139 mg/dL / Ketone: x  / Bili: x / Urobili: x   Blood: x / Protein: x / Nitrite: x   Leuk Esterase: x / RBC: x / WBC x   Sq Epi: x / Non Sq Epi: x / Bacteria: x          CAPILLARY BLOOD GLUCOSE      POCT Blood Glucose.: 149 mg/dL (28 Jan 2025 04:58)      I&O's Summary    27 Jan 2025 07:01  -  28 Jan 2025 07:00  --------------------------------------------------------  IN: 1010 mL / OUT: 800 mL / NET: 210 mL        Medications:  MEDICATIONS  (STANDING):  albuterol/ipratropium for Nebulization 3 milliLiter(s) Nebulizer every 6 hours  atorvastatin 20 milliGRAM(s) Oral at bedtime  bacitracin   Ointment 1 Application(s) Topical two times a day  chlorhexidine 0.12% Liquid 15 milliLiter(s) Oral Mucosa every 12 hours  chlorhexidine 2% Cloths 1 Application(s) Topical <User Schedule>  chlorhexidine 2% Cloths 1 Application(s) Topical <User Schedule>  escitalopram 15 milliGRAM(s) Oral daily  gabapentin Solution 300 milliGRAM(s) Oral two times a day  heparin   Injectable 7500 Unit(s) SubCutaneous every 8 hours  influenza  Vaccine (HIGH DOSE) 0.5 milliLiter(s) IntraMuscular once  lacosamide IVPB 100 milliGRAM(s) IV Intermittent every 12 hours  lamoTRIgine 100 milliGRAM(s) Oral two times a day  levETIRAcetam  IVPB 1500 milliGRAM(s) IV Intermittent two times a day  lidocaine   4% Patch 1 Patch Transdermal daily  lurasidone 40 milliGRAM(s) Oral daily  pantoprazole  Injectable 40 milliGRAM(s) IV Push daily  piperacillin/tazobactam IVPB.. 3.375 Gram(s) IV Intermittent every 12 hours  polyethylene glycol 3350 17 Gram(s) Oral daily  senna Syrup 10 milliLiter(s) Oral at bedtime  sevelamer carbonate Powder 800 milliGRAM(s) Oral every 8 hours    MEDICATIONS  (PRN):  fentaNYL    Injectable 25 MICROGram(s) IV Push every 4 hours PRN Moderate Pain (4 - 6)  nystatin Powder 1 Application(s) Topical three times a day PRN fungal infection you may see and want to treat      RADIOLOGY & ADDITIONAL TESTS were viewed by me personally.     EKG: reviewed    IMAGING: personally reviewed

## 2025-01-28 NOTE — PHYSICAL THERAPY INITIAL EVALUATION ADULT - DIAGNOSIS, PT EVAL
Dec functional mobility secondary to dec strength, balance and endurance
decreased fxl mob and endurance

## 2025-01-28 NOTE — PHYSICAL THERAPY INITIAL EVALUATION ADULT - ADDITIONAL COMMENTS
Pt lives in co-op apartment with 4 stairs to entrance +bilateral handrails. Prior to admission pt independent with all functional mobility including ambulation of short distances with cane, independent with dressing, required assist for some ADL's from HHA. Pt with [facility] 9am -1pm M-F. Sister at b/s reports she takes pt to appointments and he is able to ambulate out to her car. +tub shower, +grabbars, no shower chair.

## 2025-01-28 NOTE — AIRWAY REMOVAL NOTE  ADULT & PEDS - RESPIRATORY RHYTHM/PATTERN
no shortness of breath/rate regular/depth regular/pattern regular/unlabored
no shortness of breath/unlabored

## 2025-01-28 NOTE — PROCEDURE NOTE - NSPROCDETAILS_GEN_ALL_CORE
nasogastric/audible air bolus/placement confirmed by auscultation/bowel sounds present to 4 quadrants

## 2025-01-28 NOTE — PROGRESS NOTE ADULT - ASSESSMENT
70 y/o M with PMH of metastatic testicular cancer (s/p L orchiectomy, on etoposide/cisplatin chemo, last dose 6/2024), epilepsy (grand-mal seizures), schizophrenia, developmental delay, HTN, HLD, VAZQUEZ, stage III CKD, severe obesity, secondary hyperparathyroidism, anemia, thrombocytopenia who presented to ED for fall, possible seizure prior. On 1/17 patient had multiple RRTs called for grand mal seizure activity and  patient had x2 more episode and was ultimately given ativan 2mg, Vimpat load 200mg, and intubated for airway protection with MICU transfer. Extubated 1/19 - to AVAPS, post extubation with increased secretion. Downgraded to medical floor 1/21. Pt still with fevers, s/p RRT 1/23 AM for fever, hypoxia.  AVAPS - O2 sats 98% but tachypneic to 40, tachycardic, pt endorsing SOB. RRT ---> intubated for respiratory failure. also febrile and Hypertensive. CKD      1- CKD III  with SHAGUFTA   2- CHF   3- fevers  4- tachycardia  5- respiratory failure  6- HTN     cr worsening  suspect ischemic ATN in setting of infection and hypotension   fluid status improved based on pocus by the primary team   off diuretics now   cr still quite elevated.  but pt is non oliguric yet bun is rising and concerning   cr stabilizing at this level.   will need to monitor closely for onset uremia in this pt and may need dialysis shortly if renal function cont to deteriorate    zosyn 3,375 g iv  bid  broad spectrum antibiotic   hypernatremia add free water 200 cc q 8 feeding tube  d/w icu team when seen earlier   vent support   d/w icu team when seen earlier

## 2025-01-28 NOTE — PHYSICAL THERAPY INITIAL EVALUATION ADULT - IMPAIRMENTS CONTRIBUTING IMPAIRED BED MOBILITY, REHAB EVAL
impaired balance/decreased strength
decr endurance/impaired balance/impaired postural control/decreased strength

## 2025-01-28 NOTE — PHYSICAL THERAPY INITIAL EVALUATION ADULT - PERTINENT HX OF CURRENT PROBLEM, REHAB EVAL
Pt is a 69M admitted to [facility] on 1/16/25 w/ hx of metastatic testicular cancer (s/p L orchiectomy, on etoposide/cisplatin chemo, last dose 6/2024), epilepsy (grand-mal seizures), schizophrenia, developmental delay, HTN, HLD, VAZQUEZ, stage III CKD, severe obesity, secondary hyperparathyroidism, anemia, thrombocytopenia who presented to ED for fall, possible seizure prior. Sister at bedside providing history as well. Pt reports last seizure 3 years ago, is adherent with medications (on lamotrigine and keppra), states he believes he had a seizure at home while ambulating to get mail from his mailbox. States he doesn't typically have an aura, just 'blacks out' for about 5 minutes. Notes this time was found by neighbor with abrasion to forehead, R cheek. Unsure time down, denies tongue-biting, b/b incontinence. Normally ambulates with cane. Per ED triage note, on EMS arrival, appeared in post-ictal state. Endorsing R sided chest pressure, and neck tenderness post fall. States has been in normal state of health, eating/drinking well, did have some hot flashes/chills last night, but did not take temperature. Denies any f/c, cough, SOB, rhinorrhea, sore throat, or any other acute sx. Hospital course: In ED received 100 mg lamotrigine, keppra 1g. CTH/C spine/MF largely unremarkable apart from  mild right supraorbital and premaxillary/buccal soft tissue swelling/hematomas. CXR without focal consolidation. CBC and BMP largely unremarkable; noted to be influenza positive. Trop elevated for which cardiology was consulted; recommended TTE. Medicine called for admission. 1/17- had period of unresponsiveness at MRI ? seizure vs sepsis, rapid was called, CXR with clear lungs, no pneumonia, 1/17 s/p RRTs for grand mal seizure activity, s/p intubation and transfer to MICU,  1/17 CT chest with anterior bowing of posterior tracheal wall suggestive of tracheomalacia, trace R pleural effusion, Per MICU notes, Extubated 1/19 to AVAPS, post extubation with increased secretion now better, Per neuro: could d/c eeg, continue with vimpat, keppra and to consider MRI for new foci. On exam, pt felt improved compared to yesterday, but still feeling congested and mildly wheezy. No pain or other concerns. On NC and felt comfortable. Per nursing, secretions are requiring deep suctioning q3h, so not ready for floor.  CT head with no brain mets, s/p vEEG, negative for seizures, 1/17 echo with Left ventricular systolic function is normal with an ejection fraction of 62 %.There are no regional wall motion abnormalities seen. Normal right ventricular cavity size and normal right ventricular systolic function.
Pt is a 69M admitted to [facility] on 1/16/25 w/ hx of metastatic testicular cancer (s/p L orchiectomy, on etoposide/cisplatin chemo, last dose 6/2024), epilepsy (grand-mal seizures), schizophrenia, developmental delay, HTN, HLD, VAZQUEZ, stage III CKD, severe obesity, secondary hyperparathyroidism, anemia, thrombocytopenia who presented to ED for fall, possible seizure prior. Sister at bedside providing history as well. Pt reports last seizure 3 years ago, is adherent with medications (on lamotrigine and keppra), states he believes he had a seizure at home while ambulating to get mail from his mailbox. States he doesn't typically have an aura, just 'blacks out' for about 5 minutes. Notes this time was found by neighbor with abrasion to forehead, R cheek. Unsure time down, denies tongue-biting, b/b incontinence. Normally ambulates with cane. Per ED triage note, on EMS arrival, appeared in post-ictal state. Endorsing R sided chest pressure, and neck tenderness post fall. States has been in normal state of health, eating/drinking well, did have some hot flashes/chills last night, but did not take temperature. Denies any f/c, cough, SOB, rhinorrhea, sore throat, or any other acute sx. Hospital course: In ED received 100 mg lamotrigine, keppra 1g. CTH/C spine/MF largely unremarkable apart from  mild right supraorbital and premaxillary/buccal soft tissue swelling/hematomas. CXR without focal consolidation. CBC and BMP largely unremarkable; noted to be influenza positive. Trop elevated for which cardiology was consulted; recommended TTE. Medicine called for admission. 1/17- had period of unresponsiveness at MRI ? seizure vs sepsis, rapid was called, CXR with clear lungs, no pneumonia, 1/17 s/p RRTs for grand mal seizure activity, s/p intubation and transfer to MICU,  1/17 CT chest with anterior bowing of posterior tracheal wall suggestive of tracheomalacia, trace R pleural effusion, Per MICU notes, Extubated 1/19 to AVAPS, post extubation with increased secretion now better, Per neuro: could d/c eeg, continue with vimpat, keppra and to consider MRI for new foci. On exam, pt felt improved compared to yesterday, but still feeling congested and mildly wheezy. No pain or other concerns. On NC and felt comfortable. Per nursing, secretions are requiring deep suctioning q3h, so not ready for floor.  CT head with no brain mets, s/p vEEG, negative for seizures, 1/17 echo with Left ventricular systolic function is normal with an ejection fraction of 62 %.There are no regional wall motion abnormalities seen. Normal right ventricular cavity size and normal right ventricular systolic function. Extubated 1/19 - to AVAPS, post extubation with increased secretion, now better; downgraded 1/21; While on medical floors; patient developed respiratory distress;  pt intubated for suspected aspiration pna vs flash pulmonary edema with new fever and leukocytosis, now extubated 1/28.

## 2025-01-28 NOTE — PHYSICAL THERAPY INITIAL EVALUATION ADULT - BED MOBILITY TRAINING, PT EVAL
GOAL: pt will complete all bed mob ind in 4wks.
GOAL: pt will perform all aspects of bed mobility independently in 4 weeks

## 2025-01-28 NOTE — AIRWAY REMOVAL NOTE  ADULT & PEDS - ARTIFICAL AIRWAY REMOVAL COMMENTS
Written order for extubation verified. The patient was identified by full name and birth date compared to the identification band. Present during the procedure was RT Kane and RN Steff
Written order for extubation verified, patient was identified by full name and birth date compared to the identification band. Present during the procedure was .Radha Hendricks RN

## 2025-01-28 NOTE — PROGRESS NOTE ADULT - SUBJECTIVE AND OBJECTIVE BOX
Home medications: none  List verified by patient interview. Patient states \"I don't take any medications\". Denies use of supplements, drops, creams and patches as well. [facility]  JACINTO Horton Y. Patel, S. Shah, G. Casimir  [phone]  ([phone] - weekdays after 5pm and weekends)    Name: OSCAR MILAN  Age/Gender: 69y Male  MRN: [id number]    Interval History:  Patient seen and examined this morning.   Intubated, unable to obtain ROS.   Notes reviewed  No concerning overnight events  Afebrile   Allergies: penicillin (Hives)  seasonal allergies (Unknown)      Objective:  Vitals:   T(F): 99 (01-28-25 @ 04:00), Max: 99.1 (01-27-25 @ 20:00)  HR: 90 (01-28-25 @ 07:00) (70 - 90)  BP: 137/76 (01-28-25 @ 07:00) (104/65 - 163/79)  RR: 29 (01-28-25 @ 07:00) (14 - 29)  SpO2: 98% (01-28-25 @ 07:00) (98% - 100%)  Physical Examination:  General: no acute distress  HEENT: normocephalic, atraumatic, ETT  Respiratory: clear to auscultation anteriorly   Cardiovascular: S1 and S2 present, normal rate   Gastrointestinal: soft, nontender, nondistended  Extremities: no LE edema, no cyanosis  Skin: no visible rash    Laboratory Studies:  CBC:                       9.7    6.50  )-----------( 192      ( 28 Jan 2025 00:17 )             30.0     WBC Trend:  6.50 01-28-25 @ 00:17  6.98 01-27-25 @ 00:32  9.04 01-26-25 @ 00:16  12.77 01-25-25 @ 01:18  16.33 01-24-25 @ 00:03  13.21 01-23-25 @ 15:10  11.18 01-23-25 @ 07:38  6.42 01-23-25 @ 00:39  4.89 01-22-25 @ 07:28    CMP: 01-28    146[H]  |  103  |  113[H]  ----------------------------<  139[H]  3.8   |  18[L]  |  4.80[H]    Ca    9.3      28 Jan 2025 00:17  Phos  9.4     01-28  Mg     3.2     01-28    TPro  6.8  /  Alb  3.1[L]  /  TBili  0.4  /  DBili  x   /  AST  65[H]  /  ALT  66[H]  /  AlkPhos  134[H]  01-28    Creatinine: 4.80 mg/dL (01-28-25 @ 00:17)  Creatinine: 4.68 mg/dL (01-27-25 @ 00:32)  Creatinine: 4.66 mg/dL (01-26-25 @ 12:10)  Creatinine: 4.70 mg/dL (01-26-25 @ 00:17)  Creatinine: 4.76 mg/dL (01-25-25 @ 17:46)  Creatinine: 4.70 mg/dL (01-25-25 @ 11:31)  Creatinine: 4.31 mg/dL (01-25-25 @ 01:18)  Creatinine: 3.92 mg/dL (01-24-25 @ 17:15)  Creatinine: 3.61 mg/dL (01-24-25 @ 10:29)  Creatinine: 2.95 mg/dL (01-24-25 @ 00:03)  Creatinine: 2.69 mg/dL (01-23-25 @ 18:16)  Creatinine: 2.09 mg/dL (01-23-25 @ 07:38)  Creatinine: 2.08 mg/dL (01-23-25 @ 00:39)  Creatinine: 2.23 mg/dL (01-22-25 @ 07:26)    LIVER FUNCTIONS - ( 28 Jan 2025 00:17 )  Alb: 3.1 g/dL / Pro: 6.8 g/dL / ALK PHOS: 134 U/L / ALT: 66 U/L / AST: 65 U/L / GGT: x           Microbiology: reviewed   Culture - Sputum (collected 01-25-25 @ 07:21)  Source: .Sputum Sputum  Gram Stain (01-26-25 @ 23:12):    Numerous polymorphonuclear leukocytes per low power field    Rare Squamous epithelial cells per low power field    No organisms seen  Final Report (01-27-25 @ 17:11):    Rare Pseudomonas aeruginosa    Commensal lucia consistent with body site  Organism: Pseudomonas aeruginosa (01-27-25 @ 17:11)  Organism: Pseudomonas aeruginosa (01-27-25 @ 17:11)      Method Type: MARIELENA      -  Aztreonam: S <=4      -  Cefepime: S <=2      -  Ceftazidime: S 4      -  Ciprofloxacin: S <=0.25      -  Imipenem: S 2      -  Levofloxacin: S <=0.5      -  Meropenem: S <=1      -  Piperacillin/Tazobactam: S <=8    Culture - Urine (collected 01-24-25 @ 17:16)  Source: Catheterized Catheterized  Final Report (01-26-25 @ 02:35):    No growth    Culture - Blood (collected 01-23-25 @ 15:14)  Source: .Blood BLOOD  Preliminary Report (01-27-25 @ 20:01):    No growth at 4 days    Culture - Blood (collected 01-23-25 @ 15:14)  Source: .Blood BLOOD  Preliminary Report (01-27-25 @ 20:01):    No growth at 4 days    Culture - Blood (collected 01-23-25 @ 12:21)  Source: .Blood BLOOD  Preliminary Report (01-27-25 @ 17:01):    No growth at 4 days    Culture - Blood (collected 01-23-25 @ 12:21)  Source: .Blood BLOOD  Preliminary Report (01-27-25 @ 17:01):    No growth at 4 days    Culture - Blood (collected 01-23-25 @ 00:15)  Source: .Blood BLOOD  Final Report (01-28-25 @ 04:01):    No growth at 5 days    Culture - Blood (collected 01-23-25 @ 00:10)  Source: .Blood BLOOD  Final Report (01-28-25 @ 04:01):    No growth at 5 days    Culture - Blood (collected 01-20-25 @ 01:52)  Source: .Blood BLOOD  Final Report (01-25-25 @ 09:00):    No growth at 5 days    Culture - Blood (collected 01-19-25 @ 19:28)  Source: .Blood BLOOD  Final Report (01-25-25 @ 04:00):    No growth at 5 days    Culture - Sputum (collected 01-18-25 @ 00:36)  Source: .Sputum Sputum  Gram Stain (01-18-25 @ 06:52):    Moderate polymorphonuclear leukocytes per low power field    No Squamous epithelial cells per low power field    No organisms seen per oil power field  Final Report (01-19-25 @ 08:17):    No growth    Culture - Blood (collected 01-17-25 @ 15:10)  Source: .Blood BLOOD  Final Report (01-22-25 @ 20:00):    No growth at 5 days    Culture - Blood (collected 01-17-25 @ 14:51)  Source: .Blood BLOOD  Final Report (01-22-25 @ 20:00):    No growth at 5 days    Radiology: reviewed     Medications:  albuterol/ipratropium for Nebulization 3 milliLiter(s) Nebulizer every 6 hours  atorvastatin 20 milliGRAM(s) Oral at bedtime  bacitracin   Ointment 1 Application(s) Topical two times a day  chlorhexidine 0.12% Liquid 15 milliLiter(s) Oral Mucosa every 12 hours  chlorhexidine 2% Cloths 1 Application(s) Topical <User Schedule>  chlorhexidine 2% Cloths 1 Application(s) Topical <User Schedule>  escitalopram 15 milliGRAM(s) Oral daily  fentaNYL    Injectable 25 MICROGram(s) IV Push every 4 hours PRN  gabapentin Solution 300 milliGRAM(s) Oral two times a day  heparin   Injectable 7500 Unit(s) SubCutaneous every 8 hours  influenza  Vaccine (HIGH DOSE) 0.5 milliLiter(s) IntraMuscular once  lacosamide IVPB 100 milliGRAM(s) IV Intermittent every 12 hours  lamoTRIgine 100 milliGRAM(s) Oral two times a day  levETIRAcetam  IVPB 1500 milliGRAM(s) IV Intermittent two times a day  lidocaine   4% Patch 1 Patch Transdermal daily  lurasidone 40 milliGRAM(s) Oral daily  nystatin Powder 1 Application(s) Topical three times a day PRN  pantoprazole  Injectable 40 milliGRAM(s) IV Push daily  piperacillin/tazobactam IVPB.. 3.375 Gram(s) IV Intermittent every 12 hours  polyethylene glycol 3350 17 Gram(s) Oral daily  senna Syrup 10 milliLiter(s) Oral at bedtime  sevelamer carbonate Powder 800 milliGRAM(s) Oral every 8 hours    Current Antimicrobials:  piperacillin/tazobactam IVPB.. 3.375 Gram(s) IV Intermittent every 12 hours    Prior/Completed Antimicrobials:  cefTRIAXone   IVPB  cefTRIAXone   IVPB  oseltamivir  piperacillin/tazobactam IVPB..  vancomycin  IVPB

## 2025-01-29 LAB
ALBUMIN SERPL ELPH-MCNC: 3.4 G/DL — SIGNIFICANT CHANGE UP (ref 3.3–5)
ALBUMIN SERPL ELPH-MCNC: 3.4 G/DL — SIGNIFICANT CHANGE UP (ref 3.3–5)
ALP SERPL-CCNC: 139 U/L — HIGH (ref 40–120)
ALP SERPL-CCNC: 142 U/L — HIGH (ref 40–120)
ALT FLD-CCNC: 62 U/L — HIGH (ref 10–45)
ALT FLD-CCNC: 67 U/L — HIGH (ref 10–45)
ANION GAP SERPL CALC-SCNC: 19 MMOL/L — HIGH (ref 5–17)
ANION GAP SERPL CALC-SCNC: 22 MMOL/L — HIGH (ref 5–17)
APTT BLD: 39.3 SEC — HIGH (ref 24.5–35.6)
AST SERPL-CCNC: 62 U/L — HIGH (ref 10–40)
AST SERPL-CCNC: 63 U/L — HIGH (ref 10–40)
BASOPHILS # BLD AUTO: 0.03 K/UL — SIGNIFICANT CHANGE UP (ref 0–0.2)
BASOPHILS NFR BLD AUTO: 0.4 % — SIGNIFICANT CHANGE UP (ref 0–2)
BILIRUB SERPL-MCNC: 0.3 MG/DL — SIGNIFICANT CHANGE UP (ref 0.2–1.2)
BILIRUB SERPL-MCNC: 0.4 MG/DL — SIGNIFICANT CHANGE UP (ref 0.2–1.2)
BUN SERPL-MCNC: 117 MG/DL — HIGH (ref 7–23)
BUN SERPL-MCNC: 127 MG/DL — HIGH (ref 7–23)
CALCIUM SERPL-MCNC: 9.5 MG/DL — SIGNIFICANT CHANGE UP (ref 8.4–10.5)
CALCIUM SERPL-MCNC: 9.5 MG/DL — SIGNIFICANT CHANGE UP (ref 8.4–10.5)
CHLORIDE SERPL-SCNC: 105 MMOL/L — SIGNIFICANT CHANGE UP (ref 96–108)
CHLORIDE SERPL-SCNC: 106 MMOL/L — SIGNIFICANT CHANGE UP (ref 96–108)
CO2 SERPL-SCNC: 20 MMOL/L — LOW (ref 22–31)
CO2 SERPL-SCNC: 21 MMOL/L — LOW (ref 22–31)
CREAT SERPL-MCNC: 4.71 MG/DL — HIGH (ref 0.5–1.3)
CREAT SERPL-MCNC: 5.08 MG/DL — HIGH (ref 0.5–1.3)
EGFR: 12 ML/MIN/1.73M2 — LOW
EGFR: 13 ML/MIN/1.73M2 — LOW
EOSINOPHIL # BLD AUTO: 0.27 K/UL — SIGNIFICANT CHANGE UP (ref 0–0.5)
EOSINOPHIL NFR BLD AUTO: 3.9 % — SIGNIFICANT CHANGE UP (ref 0–6)
GAS PNL BLDV: SIGNIFICANT CHANGE UP
GLUCOSE BLDC GLUCOMTR-MCNC: 126 MG/DL — HIGH (ref 70–99)
GLUCOSE BLDC GLUCOMTR-MCNC: 133 MG/DL — HIGH (ref 70–99)
GLUCOSE BLDC GLUCOMTR-MCNC: 137 MG/DL — HIGH (ref 70–99)
GLUCOSE SERPL-MCNC: 135 MG/DL — HIGH (ref 70–99)
GLUCOSE SERPL-MCNC: 146 MG/DL — HIGH (ref 70–99)
HCT VFR BLD CALC: 31.9 % — LOW (ref 39–50)
HGB BLD-MCNC: 9.9 G/DL — LOW (ref 13–17)
IMM GRANULOCYTES NFR BLD AUTO: 3.3 % — HIGH (ref 0–0.9)
INR BLD: 0.97 RATIO — SIGNIFICANT CHANGE UP (ref 0.85–1.16)
LYMPHOCYTES # BLD AUTO: 0.85 K/UL — LOW (ref 1–3.3)
LYMPHOCYTES # BLD AUTO: 12.1 % — LOW (ref 13–44)
MAGNESIUM SERPL-MCNC: 3.5 MG/DL — HIGH (ref 1.6–2.6)
MAGNESIUM SERPL-MCNC: 3.6 MG/DL — HIGH (ref 1.6–2.6)
MCHC RBC-ENTMCNC: 31 G/DL — LOW (ref 32–36)
MCHC RBC-ENTMCNC: 31.1 PG — SIGNIFICANT CHANGE UP (ref 27–34)
MCV RBC AUTO: 100.3 FL — HIGH (ref 80–100)
MONOCYTES # BLD AUTO: 0.63 K/UL — SIGNIFICANT CHANGE UP (ref 0–0.9)
MONOCYTES NFR BLD AUTO: 9 % — SIGNIFICANT CHANGE UP (ref 2–14)
NEUTROPHILS # BLD AUTO: 5 K/UL — SIGNIFICANT CHANGE UP (ref 1.8–7.4)
NEUTROPHILS NFR BLD AUTO: 71.3 % — SIGNIFICANT CHANGE UP (ref 43–77)
NRBC # BLD: 0 /100 WBCS — SIGNIFICANT CHANGE UP (ref 0–0)
NRBC BLD-RTO: 0 /100 WBCS — SIGNIFICANT CHANGE UP (ref 0–0)
PHOSPHATE SERPL-MCNC: 8.1 MG/DL — HIGH (ref 2.5–4.5)
PHOSPHATE SERPL-MCNC: 9.3 MG/DL — HIGH (ref 2.5–4.5)
PLATELET # BLD AUTO: 215 K/UL — SIGNIFICANT CHANGE UP (ref 150–400)
POTASSIUM SERPL-MCNC: 3.3 MMOL/L — LOW (ref 3.5–5.3)
POTASSIUM SERPL-MCNC: 3.3 MMOL/L — LOW (ref 3.5–5.3)
POTASSIUM SERPL-SCNC: 3.3 MMOL/L — LOW (ref 3.5–5.3)
POTASSIUM SERPL-SCNC: 3.3 MMOL/L — LOW (ref 3.5–5.3)
PROT SERPL-MCNC: 6.9 G/DL — SIGNIFICANT CHANGE UP (ref 6–8.3)
PROT SERPL-MCNC: 7.1 G/DL — SIGNIFICANT CHANGE UP (ref 6–8.3)
PROTHROM AB SERPL-ACNC: 11.1 SEC — SIGNIFICANT CHANGE UP (ref 9.9–13.4)
RBC # BLD: 3.18 M/UL — LOW (ref 4.2–5.8)
RBC # FLD: 13.1 % — SIGNIFICANT CHANGE UP (ref 10.3–14.5)
SODIUM SERPL-SCNC: 146 MMOL/L — HIGH (ref 135–145)
SODIUM SERPL-SCNC: 147 MMOL/L — HIGH (ref 135–145)
WBC # BLD: 7.01 K/UL — SIGNIFICANT CHANGE UP (ref 3.8–10.5)
WBC # FLD AUTO: 7.01 K/UL — SIGNIFICANT CHANGE UP (ref 3.8–10.5)

## 2025-01-29 PROCEDURE — 99291 CRITICAL CARE FIRST HOUR: CPT

## 2025-01-29 PROCEDURE — 71045 X-RAY EXAM CHEST 1 VIEW: CPT | Mod: 26

## 2025-01-29 RX ORDER — POTASSIUM CHLORIDE 750 MG/1
20 TABLET, EXTENDED RELEASE ORAL ONCE
Refills: 0 | Status: COMPLETED | OUTPATIENT
Start: 2025-01-29 | End: 2025-01-29

## 2025-01-29 RX ORDER — ACETAMINOPHEN 160 MG/5ML
650 SUSPENSION ORAL ONCE
Refills: 0 | Status: COMPLETED | OUTPATIENT
Start: 2025-01-29 | End: 2025-01-29

## 2025-01-29 RX ORDER — ATORVASTATIN CALCIUM 80 MG/1
20 TABLET, FILM COATED ORAL AT BEDTIME
Refills: 0 | Status: DISCONTINUED | OUTPATIENT
Start: 2025-01-29 | End: 2025-02-02

## 2025-01-29 RX ORDER — SODIUM CHLORIDE 9 G/ML
1000 INJECTION, SOLUTION INTRAVENOUS
Refills: 0 | Status: DISCONTINUED | OUTPATIENT
Start: 2025-01-29 | End: 2025-01-29

## 2025-01-29 RX ORDER — SEVELAMER CARBONATE 800 MG/1
800 TABLET, FILM COATED ORAL EVERY 8 HOURS
Refills: 0 | Status: DISCONTINUED | OUTPATIENT
Start: 2025-01-29 | End: 2025-01-29

## 2025-01-29 RX ORDER — POLYETHYLENE GLYCOL 3350 17 G/17G
17 POWDER, FOR SOLUTION ORAL DAILY
Refills: 0 | Status: DISCONTINUED | OUTPATIENT
Start: 2025-01-29 | End: 2025-02-02

## 2025-01-29 RX ORDER — LURASIDONE HYDROCHLORIDE 80 MG/1
40 TABLET, FILM COATED ORAL DAILY
Refills: 0 | Status: DISCONTINUED | OUTPATIENT
Start: 2025-01-29 | End: 2025-02-02

## 2025-01-29 RX ORDER — POTASSIUM CHLORIDE 750 MG/1
10 TABLET, EXTENDED RELEASE ORAL
Refills: 0 | Status: COMPLETED | OUTPATIENT
Start: 2025-01-29 | End: 2025-01-29

## 2025-01-29 RX ORDER — ESCITALOPRAM 10 MG/1
15 TABLET, FILM COATED ORAL DAILY
Refills: 0 | Status: DISCONTINUED | OUTPATIENT
Start: 2025-01-29 | End: 2025-02-02

## 2025-01-29 RX ORDER — SENNOSIDES 8.6 MG
10 TABLET ORAL AT BEDTIME
Refills: 0 | Status: DISCONTINUED | OUTPATIENT
Start: 2025-01-29 | End: 2025-02-02

## 2025-01-29 RX ORDER — LAMOTRIGINE 100 MG/1
100 TABLET ORAL
Refills: 0 | Status: DISCONTINUED | OUTPATIENT
Start: 2025-01-29 | End: 2025-02-02

## 2025-01-29 RX ORDER — SEVELAMER CARBONATE 800 MG/1
800 TABLET, FILM COATED ORAL
Refills: 0 | Status: DISCONTINUED | OUTPATIENT
Start: 2025-01-29 | End: 2025-02-01

## 2025-01-29 RX ORDER — GABAPENTIN 800 MG/1
150 TABLET ORAL
Refills: 0 | Status: DISCONTINUED | OUTPATIENT
Start: 2025-01-29 | End: 2025-02-02

## 2025-01-29 RX ADMIN — GABAPENTIN 150 MILLIGRAM(S): 800 TABLET ORAL at 16:54

## 2025-01-29 RX ADMIN — POTASSIUM CHLORIDE 10 MILLIEQUIVALENT(S): 750 TABLET, EXTENDED RELEASE ORAL at 21:30

## 2025-01-29 RX ADMIN — PANTOPRAZOLE 40 MILLIGRAM(S): 20 TABLET, DELAYED RELEASE ORAL at 12:03

## 2025-01-29 RX ADMIN — LIDOCAINE HYDROCHLORIDE 1 PATCH: 30 CREAM TOPICAL at 05:45

## 2025-01-29 RX ADMIN — GABAPENTIN 150 MILLIGRAM(S): 800 TABLET ORAL at 05:44

## 2025-01-29 RX ADMIN — ESCITALOPRAM 15 MILLIGRAM(S): 10 TABLET, FILM COATED ORAL at 12:04

## 2025-01-29 RX ADMIN — ANTISEPTIC SURGICAL SCRUB 1 APPLICATION(S): 0.04 SOLUTION TOPICAL at 05:46

## 2025-01-29 RX ADMIN — IPRATROPIUM BROMIDE AND ALBUTEROL SULFATE 3 MILLILITER(S): .5; 2.5 SOLUTION RESPIRATORY (INHALATION) at 16:55

## 2025-01-29 RX ADMIN — LEVETIRACETAM 400 MILLIGRAM(S): 750 TABLET, FILM COATED ORAL at 05:44

## 2025-01-29 RX ADMIN — SEVELAMER CARBONATE 800 MILLIGRAM(S): 800 TABLET, FILM COATED ORAL at 21:29

## 2025-01-29 RX ADMIN — LAMOTRIGINE 100 MILLIGRAM(S): 100 TABLET ORAL at 16:55

## 2025-01-29 RX ADMIN — IPRATROPIUM BROMIDE AND ALBUTEROL SULFATE 3 MILLILITER(S): .5; 2.5 SOLUTION RESPIRATORY (INHALATION) at 12:04

## 2025-01-29 RX ADMIN — ACETAMINOPHEN 650 MILLIGRAM(S): 160 SUSPENSION ORAL at 12:34

## 2025-01-29 RX ADMIN — LURASIDONE HYDROCHLORIDE 40 MILLIGRAM(S): 80 TABLET, FILM COATED ORAL at 12:04

## 2025-01-29 RX ADMIN — LAMOTRIGINE 100 MILLIGRAM(S): 100 TABLET ORAL at 05:46

## 2025-01-29 RX ADMIN — ATORVASTATIN CALCIUM 20 MILLIGRAM(S): 80 TABLET, FILM COATED ORAL at 21:29

## 2025-01-29 RX ADMIN — Medication 7500 UNIT(S): at 06:28

## 2025-01-29 RX ADMIN — PIPERACILLIN SODIUM AND TAZOBACTAM SODIUM 25 GRAM(S): 2; 250 INJECTION, POWDER, FOR SOLUTION INTRAVENOUS at 16:54

## 2025-01-29 RX ADMIN — LACOSAMIDE 120 MILLIGRAM(S): 200 TABLET, FILM COATED ORAL at 05:44

## 2025-01-29 RX ADMIN — SODIUM CHLORIDE 75 MILLILITER(S): 9 INJECTION, SOLUTION INTRAVENOUS at 09:52

## 2025-01-29 RX ADMIN — LEVETIRACETAM 400 MILLIGRAM(S): 750 TABLET, FILM COATED ORAL at 16:55

## 2025-01-29 RX ADMIN — PIPERACILLIN SODIUM AND TAZOBACTAM SODIUM 25 GRAM(S): 2; 250 INJECTION, POWDER, FOR SOLUTION INTRAVENOUS at 05:46

## 2025-01-29 RX ADMIN — ACETAMINOPHEN 650 MILLIGRAM(S): 160 SUSPENSION ORAL at 12:04

## 2025-01-29 RX ADMIN — IPRATROPIUM BROMIDE AND ALBUTEROL SULFATE 3 MILLILITER(S): .5; 2.5 SOLUTION RESPIRATORY (INHALATION) at 06:02

## 2025-01-29 RX ADMIN — LACOSAMIDE 120 MILLIGRAM(S): 200 TABLET, FILM COATED ORAL at 17:43

## 2025-01-29 RX ADMIN — Medication 1 APPLICATION(S): at 05:46

## 2025-01-29 RX ADMIN — Medication 1 APPLICATION(S): at 18:38

## 2025-01-29 RX ADMIN — SODIUM CHLORIDE 50 MILLILITER(S): 9 INJECTION, SOLUTION INTRAVENOUS at 01:51

## 2025-01-29 RX ADMIN — POTASSIUM CHLORIDE 20 MILLIEQUIVALENT(S): 750 TABLET, EXTENDED RELEASE ORAL at 01:59

## 2025-01-29 RX ADMIN — POTASSIUM CHLORIDE 10 MILLIEQUIVALENT(S): 750 TABLET, EXTENDED RELEASE ORAL at 16:56

## 2025-01-29 RX ADMIN — SEVELAMER CARBONATE 800 MILLIGRAM(S): 800 TABLET, FILM COATED ORAL at 12:04

## 2025-01-29 NOTE — SWALLOW BEDSIDE ASSESSMENT ADULT - SWALLOW EVAL: DIAGNOSIS
69YOM h/o  epilepsy (grand-mal seizures), schizophrenia, developmental delay p/f fall and syncope, now s/p intubation/extubation x2, seen for swallow evaluation. Pt p/w suspected pharyngeal dysphagia. Oral phase of swallow grossly intact. Pharyngeal swallow suspected to be delayed with reduced hyolaryngeal elevation/excursion upon palpation. Intermittent stertor during the swallow with delayed coughing post swallow, ?related to PO intake vs baseline cough. 69YOM h/o  epilepsy (grand-mal seizures), schizophrenia, developmental delay p/f fall and syncope, initially intubated 2/2 seizures, re-intubated for asp pna vs flash pulm edema, s/p extubation. H/o FEES prior to re-intubation with rx soft bite-size/mildly thick liquids. Pt p/w suspected pharyngeal dysphagia. Oral phase of swallow grossly intact. Pharyngeal swallow suspected to be delayed with reduced hyolaryngeal elevation/excursion upon palpation. Intermittent stertor during the swallow with delayed coughing post swallow, ?related to PO intake vs baseline cough.

## 2025-01-29 NOTE — PROGRESS NOTE ADULT - SUBJECTIVE AND OBJECTIVE BOX
Date of Service: 01-29-25 @ 14:03    Patient is a 69y old  Male who presents with a chief complaint of seizure, flu, elevated trop (29 Jan 2025 09:03)      Any change in ROS: pt is doing wel : remains extubated  on 2 L o f oxygen ; alert anwake:  no events overnight     MEDICATIONS  (STANDING):  albuterol/ipratropium for Nebulization 3 milliLiter(s) Nebulizer every 6 hours  atorvastatin 20 milliGRAM(s) Oral at bedtime  bacitracin   Ointment 1 Application(s) Topical two times a day  chlorhexidine 2% Cloths 1 Application(s) Topical <User Schedule>  chlorhexidine 2% Cloths 1 Application(s) Topical <User Schedule>  escitalopram 15 milliGRAM(s) Oral daily  gabapentin Solution 150 milliGRAM(s) Oral two times a day  heparin   Injectable 7500 Unit(s) SubCutaneous every 8 hours  influenza  Vaccine (HIGH DOSE) 0.5 milliLiter(s) IntraMuscular once  lacosamide IVPB 100 milliGRAM(s) IV Intermittent every 12 hours  lamoTRIgine 100 milliGRAM(s) Oral two times a day  levETIRAcetam  IVPB 750 milliGRAM(s) IV Intermittent two times a day  lidocaine   4% Patch 1 Patch Transdermal daily  lurasidone 40 milliGRAM(s) Oral daily  pantoprazole  Injectable 40 milliGRAM(s) IV Push daily  piperacillin/tazobactam IVPB.. 3.375 Gram(s) IV Intermittent every 12 hours  polyethylene glycol 3350 17 Gram(s) Oral daily  potassium chloride   Solution 10 milliEquivalent(s) Oral every 2 hours  senna Syrup 10 milliLiter(s) Oral at bedtime  sevelamer carbonate Powder 800 milliGRAM(s) Oral three times a day with meals  sodium chloride 0.45%. 1000 milliLiter(s) (75 mL/Hr) IV Continuous <Continuous>    MEDICATIONS  (PRN):  nystatin Powder 1 Application(s) Topical three times a day PRN fungal infection you may see and want to treat    Vital Signs Last 24 Hrs  T(C): 36.6 (29 Jan 2025 12:00), Max: 37.2 (28 Jan 2025 16:00)  T(F): 97.9 (29 Jan 2025 12:00), Max: 98.9 (28 Jan 2025 16:00)  HR: 91 (29 Jan 2025 12:04) (72 - 97)  BP: 123/71 (29 Jan 2025 12:00) (90/52 - 138/78)  BP(mean): 92 (29 Jan 2025 12:00) (65 - 98)  RR: 26 (29 Jan 2025 12:00) (15 - 28)  SpO2: 92% (29 Jan 2025 12:04) (90% - 100%)    Parameters below as of 29 Jan 2025 12:04  Patient On (Oxygen Delivery Method): room air        I&O's Summary    28 Jan 2025 07:01  -  29 Jan 2025 07:00  --------------------------------------------------------  IN: 1960 mL / OUT: 900 mL / NET: 1060 mL    29 Jan 2025 07:01  -  29 Jan 2025 14:03  --------------------------------------------------------  IN: 780 mL / OUT: 225 mL / NET: 555 mL          Physical Exam:   GENERAL: NAD, well-groomed, well-developed  HEENT: BREE/   Atraumatic, Normocephalic  ENMT: No tonsillar erythema, exudates, or enlargement; Moist mucous membranes, Good dentition, No lesions  NECK: Supple, No JVD, Normal thyroid  CHEST/LUNG: Clear to auscultaion, ; No rales, rhonchi, wheezing, or rubs  CVS: Regular rate and rhythm; No murmurs, rubs, or gallops  GI: : Soft, Nontender, Nondistended; Bowel sounds present  NERVOUS SYSTEM:  Alert & Oriented X3, Good concentration; Motor Strength 5/5 B/L upper and lower extremities; DTRs 2+ intact and symmetric  EXTREMITIES:  2+ Peripheral Pulses, No clubbing, cyanosis, or edema  LYMPH: No lymphadenopathy noted  SKIN: No rashes or lesions  ENDOCRINOLOGY: No Thyromegaly  PSYCH: Appropriate    Labs:  ABG - ( 28 Jan 2025 00:14 )  pH, Arterial: 7.44  pH, Blood: x     /  pCO2: 33    /  pO2: 102   / HCO3: 22    / Base Excess: -1.3  /  SaO2: 97.2            24, 21.0, 30.0, 30.0, 100                            9.9    7.01  )-----------( 215      ( 29 Jan 2025 00:40 )             31.9                         9.7    6.50  )-----------( 192      ( 28 Jan 2025 00:17 )             30.0                         10.0   6.98  )-----------( 184      ( 27 Jan 2025 00:32 )             30.8                         8.8    9.04  )-----------( 166      ( 26 Jan 2025 00:16 )             26.6     01-29    146[H]  |  106  |  117[H]  ----------------------------<  135[H]  3.3[L]   |  21[L]  |  4.71[H]  01-29    147[H]  |  105  |  127[H]  ----------------------------<  146[H]  3.3[L]   |  20[L]  |  5.08[H]  01-28    148[H]  |  106  |  114[H]  ----------------------------<  123[H]  3.8   |  21[L]  |  4.86[H]  01-28    146[H]  |  103  |  113[H]  ----------------------------<  139[H]  3.8   |  18[L]  |  4.80[H]  01-27    143  |  98  |  92[H]  ----------------------------<  139[H]  3.6   |  20[L]  |  4.68[H]  01-26    142  |  100  |  97[H]  ----------------------------<  130[H]  3.8   |  19[L]  |  4.66[H]  01-26    141  |  99  |  91[H]  ----------------------------<  126[H]  3.6   |  20[L]  |  4.70[H]  01-25    139  |  99  |  82[H]  ----------------------------<  110[H]  3.5   |  18[L]  |  4.76[H]    Ca    9.5      29 Jan 2025 11:56  Ca    9.5      29 Jan 2025 00:39  Ca    9.7      28 Jan 2025 15:25  Ca    9.3      28 Jan 2025 00:17  Phos  8.1     01-29  Phos  9.3     01-29  Phos  9.4     01-28  Phos  9.4     01-28  Mg     3.6     01-29  Mg     3.5     01-29  Mg     3.5     01-28  Mg     3.2     01-28    TPro  7.1  /  Alb  3.4  /  TBili  0.3  /  DBili  x   /  AST  62[H]  /  ALT  62[H]  /  AlkPhos  139[H]  01-29  TPro  6.9  /  Alb  3.4  /  TBili  0.4  /  DBili  x   /  AST  63[H]  /  ALT  67[H]  /  AlkPhos  142[H]  01-29  TPro  7.3  /  Alb  3.7  /  TBili  0.4  /  DBili  x   /  AST  67[H]  /  ALT  72[H]  /  AlkPhos  136[H]  01-28  TPro  6.8  /  Alb  3.1[L]  /  TBili  0.4  /  DBili  x   /  AST  65[H]  /  ALT  66[H]  /  AlkPhos  134[H]  01-28  TPro  6.9  /  Alb  3.3  /  TBili  0.5  /  DBili  x   /  AST  87[H]  /  ALT  92[H]  /  AlkPhos  155[H]  01-27  TPro  7.0  /  Alb  3.2[L]  /  TBili  0.6  /  DBili  x   /  AST  83[H]  /  ALT  96[H]  /  AlkPhos  154[H]  01-26    CAPILLARY BLOOD GLUCOSE      POCT Blood Glucose.: 126 mg/dL (29 Jan 2025 06:40)      LIVER FUNCTIONS - ( 29 Jan 2025 11:56 )  Alb: 3.4 g/dL / Pro: 7.1 g/dL / ALK PHOS: 139 U/L / ALT: 62 U/L / AST: 62 U/L / GGT: x           PT/INR - ( 29 Jan 2025 00:40 )   PT: 11.1 sec;   INR: 0.97 ratio         PTT - ( 29 Jan 2025 00:40 )  PTT:39.3 sec  Urinalysis Basic - ( 29 Jan 2025 11:56 )    Color: x / Appearance: x / SG: x / pH: x  Gluc: 135 mg/dL / Ketone: x  / Bili: x / Urobili: x   Blood: x / Protein: x / Nitrite: x   Leuk Esterase: x / RBC: x / WBC x   Sq Epi: x / Non Sq Epi: x / Bacteria: x            RECENT CULTURES:  01-25 @ 07:21 .Sputum Sputum   MARIELENA    Numerous polymorphonuclear leukocytes per low power field  Rare Squamous epithelial cells per low power field  No organisms seen    Pseudomonas aeruginosa  Pseudomonas aeruginosa     Rare Pseudomonas aeruginosa  Commensal lucia consistent with body site    01-24 @ 17:16 Catheterized Catheterized                No growth    01-23 @ 15:14 .Blood BLOOD                No growth at 5 days    01-23 @ 12:21 .Blood BLOOD                No growth at 5 days    01-23 @ 00:15 .Blood BLOOD                No growth at 5 days    01-23 @ 00:10 .Blood BLOOD                No growth at 5 days          RESPIRATORY CULTURES:          Studies  Chest X-RAY  CT SCAN Chest   Venous Dopplers: LE:   CT Abdomen  Others    rad< from: Xray Chest 1 View- PORTABLE-Urgent (Xray Chest 1 View- PORTABLE-Urgent .) (01.28.25 @ 17:44) >    ACC: [id number] EXAM:  XR CHEST PORTABLE URGENT 1V   ORDERED BY: MEREDITH GOULD     PROCEDURE DATE:  01/28/2025          INTERPRETATION:  EXAMINATION: XR CHEST URGENT    CLINICAL INDICATION: Evaluate line placement    TECHNIQUE: Single frontal view of the chest was obtained.    COMPARISON: Chest x-ray 1/25/2025.    FINDINGS:    Interval removal of endotracheal tube. Enteric tube overlies the stomach.   Right chest wall port with tip in the right atrium.  Bibasilar atelectasis. No focal consolidation, large pleural effusion or   pneumothorax.  The heart size cannot be accurately assessed on this projection.  No acute osseous abnormalities. Thoracolumbar spine hardware.    IMPRESSION:  Support devices as described above.    --- End of Report ---          DAGO WOODSON MD; Resident Radiologist  This document has been electronically signed.  STEPHANIE HARMON MD; Attending Radiologist  This document has been electronically signed. Jan 29 2025  9:10AM    < end of copied text >

## 2025-01-29 NOTE — SWALLOW BEDSIDE ASSESSMENT ADULT - H & P REVIEW
69M w/ hx of metastatic testicular cancer (s/p L orchiectomy, on etoposide/cisplatin chemo, last dose 6/2024), epilepsy (grand-mal seizures), schizophrenia, developmental delay, HTN, HLD, VAZQUEZ, stage III CKD, obesity, secondary hyperparathyroidism, anemia, thrombocytopenia, ? spinal surgery who was admitted on 1/16/25 for fall and syncope. On 1/17 patient had multiple RRTs called for grand mal seizure activity and  patient had x2 more episode and was ultimately given ativan 2mg, Vimpat load 200mg, and intubated for airway protection with MICU transfer. Extubated 1/19 - to AVAPS, post extubation with increased secretion, now better; downgraded 1/21; While on medical floors; patient developed respiratory distress; re-intubated for suspected aspiration pna vs flash pulmonary edema with new fever and leukocytosis. Re-extubated 1/28./yes

## 2025-01-29 NOTE — PROGRESS NOTE ADULT - PROBLEM SELECTOR PLAN 1
-S/p intubation in MICU in the setting of grand mal seizures, pneumonia, Flu  -Completed course of ABX, tamiflu for Flu. Extubated to AVAPS 1/19  -S/p RRT 1/23 AM for fevers, hypoxia requiring AVAPS  -Pt hypertensive, concern for flash pulm edema  -WBC uptrending, febrile to 101F during RRT, ? aspiration event   -S/p 2nd RRT 1/23 PM for increased WOB on AVAPS, intubated and tx to MICU   -Keep O>I as tolerated  -Continue ABX  -Continue bronchodilators  -Keep sats >90%, pH >7.2.  -remains on full vent support:  ct chest showed only 4 mm nodules:   -MRI with some patchy opacities in rigth upper lung zone: cont antibiotics  on cpap trials today  : alert and bella cont ggressive weaning trials: on zosyn  for aspiration precautions  1/28:o: extubated today  : resp status still tenuous:  monitor : ABG good:  1/29: remains extubated:  doing ok : on 2 L of oxygen : no resp distress : cxr wi th low lung volumes:  still on zosyn

## 2025-01-29 NOTE — PROGRESS NOTE ADULT - SUBJECTIVE AND OBJECTIVE BOX
***************************************************************  Gretta Demar, PGY-1  on TEAMS  ***************************************************************    OSCAR MILAN ([id number])- Patient is a 69y old  Male who presents with a chief complaint of seizure, flu, elevated trop (27 Jan 2025 04:55)      INTERVAL/OVERNIGHT EVENTS/SUBJECTIVE:   No acute events overnight.     Pt seen at bedside.    PHYSICAL EXAM:  General: Not-intubated.  HEENT: Normocephalic. Atraumatic. Normal icterus. MMM  Heart: Regular rate, rhythm  Lungs: faint crackles, mild wheeze, no distress.  GI: Soft, distended/obese, nontender  Neuro: Sedated, pupils appropriate  Ext: Trace edema bilaterally.   Skin: Warm, well-perfused       Vital Signs Last 24 Hrs  T(C): 36.6 (29 Jan 2025 04:00), Max: 37.2 (28 Jan 2025 12:00)  T(F): 97.8 (29 Jan 2025 04:00), Max: 99 (28 Jan 2025 12:00)  HR: 90 (29 Jan 2025 06:27) (72 - 97)  BP: 104/57 (29 Jan 2025 05:00) (90/52 - 139/73)  BP(mean): 76 (29 Jan 2025 05:00) (65 - 99)  RR: 20 (29 Jan 2025 05:00) (15 - 29)  SpO2: 100% (29 Jan 2025 06:27) (91% - 100%)    Parameters below as of 28 Jan 2025 20:00      O2 Concentration (%): 21    LABS (available at time of writing 01-29-25 @ 06:58):                         9.9    7.01  )-----------( 215      ( 29 Jan 2025 00:40 )             31.9     01-29    147[H]  |  105  |  127[H]  ----------------------------<  146[H]  3.3[L]   |  20[L]  |  5.08[H]    Ca    9.5      29 Jan 2025 00:39  Phos  9.3     01-29  Mg     3.5     01-29    TPro  6.9  /  Alb  3.4  /  TBili  0.4  /  DBili  x   /  AST  63[H]  /  ALT  67[H]  /  AlkPhos  142[H]  01-29    ABG - ( 28 Jan 2025 00:14 )  pH, Arterial: 7.44  pH, Blood: x     /  pCO2: 33    /  pO2: 102   / HCO3: 22    / Base Excess: -1.3  /  SaO2: 97.2              Lactate Trend    PT/INR - ( 29 Jan 2025 00:40 )   PT: 11.1 sec;   INR: 0.97 ratio         PTT - ( 29 Jan 2025 00:40 )  PTT:39.3 sec  Urinalysis Basic - ( 29 Jan 2025 00:39 )    Color: x / Appearance: x / SG: x / pH: x  Gluc: 146 mg/dL / Ketone: x  / Bili: x / Urobili: x   Blood: x / Protein: x / Nitrite: x   Leuk Esterase: x / RBC: x / WBC x   Sq Epi: x / Non Sq Epi: x / Bacteria: x          CAPILLARY BLOOD GLUCOSE      POCT Blood Glucose.: 126 mg/dL (29 Jan 2025 06:40)      I&O's Summary    27 Jan 2025 07:01  -  28 Jan 2025 07:00  --------------------------------------------------------  IN: 1035 mL / OUT: 800 mL / NET: 235 mL    28 Jan 2025 07:01  -  29 Jan 2025 06:58  --------------------------------------------------------  IN: 1675 mL / OUT: 400 mL / NET: 1275 mL        Medications:  MEDICATIONS  (STANDING):  albuterol/ipratropium for Nebulization 3 milliLiter(s) Nebulizer every 6 hours  atorvastatin 20 milliGRAM(s) Oral at bedtime  bacitracin   Ointment 1 Application(s) Topical two times a day  chlorhexidine 2% Cloths 1 Application(s) Topical <User Schedule>  chlorhexidine 2% Cloths 1 Application(s) Topical <User Schedule>  escitalopram 15 milliGRAM(s) Oral daily  gabapentin Solution 150 milliGRAM(s) Oral two times a day  heparin   Injectable 7500 Unit(s) SubCutaneous every 8 hours  influenza  Vaccine (HIGH DOSE) 0.5 milliLiter(s) IntraMuscular once  lacosamide IVPB 100 milliGRAM(s) IV Intermittent every 12 hours  lamoTRIgine 100 milliGRAM(s) Oral two times a day  levETIRAcetam  IVPB 750 milliGRAM(s) IV Intermittent two times a day  lidocaine   4% Patch 1 Patch Transdermal daily  lurasidone 40 milliGRAM(s) Oral daily  pantoprazole  Injectable 40 milliGRAM(s) IV Push daily  piperacillin/tazobactam IVPB.. 3.375 Gram(s) IV Intermittent every 12 hours  polyethylene glycol 3350 17 Gram(s) Oral daily  senna Syrup 10 milliLiter(s) Oral at bedtime  sevelamer carbonate Powder 800 milliGRAM(s) Oral three times a day with meals    MEDICATIONS  (PRN):  nystatin Powder 1 Application(s) Topical three times a day PRN fungal infection you may see and want to treat      RADIOLOGY & ADDITIONAL TESTS were viewed by me personally.     EKG: reviewed    IMAGING: personally reviewed                     ***************************************************************  Gretta Benz, PGY-1  on TEAMS  ***************************************************************    OSCAR MILAN ([id number])- Patient is a 69y old  Male who presents with a chief complaint of seizure, flu, elevated trop (27 Jan 2025 04:55)      INTERVAL/OVERNIGHT EVENTS/SUBJECTIVE:   No acute events overnight. AVAPS overnight. Cr 5.08 -> now free water 200mL Q8H.     Pt seen at bedside.    PHYSICAL EXAM:  General: Not-intubated.  HEENT: Normocephalic. Atraumatic. Normal icterus. MMM  Heart: Regular rate, rhythm  Lungs: faint crackles, mild wheeze, no distress.  GI: Soft, distended/obese, nontender  Neuro: Sedated, pupils appropriate  Ext: Trace edema bilaterally.   Skin: Warm, well-perfused       Vital Signs Last 24 Hrs  T(C): 36.6 (29 Jan 2025 04:00), Max: 37.2 (28 Jan 2025 12:00)  T(F): 97.8 (29 Jan 2025 04:00), Max: 99 (28 Jan 2025 12:00)  HR: 90 (29 Jan 2025 06:27) (72 - 97)  BP: 104/57 (29 Jan 2025 05:00) (90/52 - 139/73)  BP(mean): 76 (29 Jan 2025 05:00) (65 - 99)  RR: 20 (29 Jan 2025 05:00) (15 - 29)  SpO2: 100% (29 Jan 2025 06:27) (91% - 100%)    Parameters below as of 28 Jan 2025 20:00      O2 Concentration (%): 21    LABS (available at time of writing 01-29-25 @ 06:58):                         9.9    7.01  )-----------( 215      ( 29 Jan 2025 00:40 )             31.9     01-29    147[H]  |  105  |  127[H]  ----------------------------<  146[H]  3.3[L]   |  20[L]  |  5.08[H]    Ca    9.5      29 Jan 2025 00:39  Phos  9.3     01-29  Mg     3.5     01-29    TPro  6.9  /  Alb  3.4  /  TBili  0.4  /  DBili  x   /  AST  63[H]  /  ALT  67[H]  /  AlkPhos  142[H]  01-29    ABG - ( 28 Jan 2025 00:14 )  pH, Arterial: 7.44  pH, Blood: x     /  pCO2: 33    /  pO2: 102   / HCO3: 22    / Base Excess: -1.3  /  SaO2: 97.2              Lactate Trend    PT/INR - ( 29 Jan 2025 00:40 )   PT: 11.1 sec;   INR: 0.97 ratio         PTT - ( 29 Jan 2025 00:40 )  PTT:39.3 sec  Urinalysis Basic - ( 29 Jan 2025 00:39 )    Color: x / Appearance: x / SG: x / pH: x  Gluc: 146 mg/dL / Ketone: x  / Bili: x / Urobili: x   Blood: x / Protein: x / Nitrite: x   Leuk Esterase: x / RBC: x / WBC x   Sq Epi: x / Non Sq Epi: x / Bacteria: x          CAPILLARY BLOOD GLUCOSE      POCT Blood Glucose.: 126 mg/dL (29 Jan 2025 06:40)      I&O's Summary    27 Jan 2025 07:01  -  28 Jan 2025 07:00  --------------------------------------------------------  IN: 1035 mL / OUT: 800 mL / NET: 235 mL    28 Jan 2025 07:01  -  29 Jan 2025 06:58  --------------------------------------------------------  IN: 1675 mL / OUT: 400 mL / NET: 1275 mL        Medications:  MEDICATIONS  (STANDING):  albuterol/ipratropium for Nebulization 3 milliLiter(s) Nebulizer every 6 hours  atorvastatin 20 milliGRAM(s) Oral at bedtime  bacitracin   Ointment 1 Application(s) Topical two times a day  chlorhexidine 2% Cloths 1 Application(s) Topical <User Schedule>  chlorhexidine 2% Cloths 1 Application(s) Topical <User Schedule>  escitalopram 15 milliGRAM(s) Oral daily  gabapentin Solution 150 milliGRAM(s) Oral two times a day  heparin   Injectable 7500 Unit(s) SubCutaneous every 8 hours  influenza  Vaccine (HIGH DOSE) 0.5 milliLiter(s) IntraMuscular once  lacosamide IVPB 100 milliGRAM(s) IV Intermittent every 12 hours  lamoTRIgine 100 milliGRAM(s) Oral two times a day  levETIRAcetam  IVPB 750 milliGRAM(s) IV Intermittent two times a day  lidocaine   4% Patch 1 Patch Transdermal daily  lurasidone 40 milliGRAM(s) Oral daily  pantoprazole  Injectable 40 milliGRAM(s) IV Push daily  piperacillin/tazobactam IVPB.. 3.375 Gram(s) IV Intermittent every 12 hours  polyethylene glycol 3350 17 Gram(s) Oral daily  senna Syrup 10 milliLiter(s) Oral at bedtime  sevelamer carbonate Powder 800 milliGRAM(s) Oral three times a day with meals    MEDICATIONS  (PRN):  nystatin Powder 1 Application(s) Topical three times a day PRN fungal infection you may see and want to treat      RADIOLOGY & ADDITIONAL TESTS were viewed by me personally.     EKG: reviewed    IMAGING: personally reviewed

## 2025-01-29 NOTE — PROGRESS NOTE ADULT - ATTENDING COMMENTS
Off propofol for 24 hours and taken off precedex 10 minutes prior to exam. Continues to be seizure free on EEG.    Exam with ability to squeeze fingers on command, withdraws equally throughout.    - D/c EEG  - C/w AEDs as above
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 Admitted s/p fall, course complicated by seizures and intubated for airway protection. Extubated on 1/19 and transferred to [facility]. On 1/23 intubated in setting of worsening respiratory distress and fever, found to have pseudomonas pneumonia.     #Neuro - off all sedation, remains calm, can use precedex if necessary; continue current AEDs- keppra, vimpat, lamotrigine; noted to have lesion in L cerebellar peduncle concerning for metastatic focus but unclear source - will need workup once stabilized  #CV - HD stable  #Pulm - remains intubated on minimal vent settings; SBT as tolerated - plan for extubation today; continue duonebs   #ID- currently on zosyn for pseudomonas in sputum, pan-sensitive, plan for 7 day course- last day 1/29  #Renal/metabolic - SHAGUFTA on CKD, likely due to ATN, worsening but non-oliguric; previously on bumex for volume overload but now off - pt appears euvolemic at this time and continues to have sufficient UOP and remains net even/negative; monitor I/Os and electrolytes; free water for hyperNa  #GI- continue TF, PPI  #Heme - incidentally found to have L cerebellar concerning for metastasis - was treated for testicular Ca and no clear evidence for recurrence, will need PET-CT once stabilized  #Endo - BG acceptable; noted thyroid nodule, evaluate once more stable  #PPx - HSQ 7500 q8  #Dispo- extubation attempt today but high risk for re-intubation, will remain in the ICU; prognosis guarded; full code
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 Admitted s/p fall, course complicated by seizures and intubated for airway protection. Extubated on 1/19 and transferred to [facility]. On 1/23 intubated in setting of worsening respiratory distress and fever, found to have pseudomonas pneumonia.     #Neuro - on low dose propofol but minimally responsive, turn off sedation and see if more arousable, if needs sedation add precedex; continue current AEDs- keppra, vimpat, lamotrigine; noted to have lesion in L cerebellar peduncle concerning for metastatic focus but unclear source - will need workup once stabilized  #CV - HD stable  #Pulm - remains intubated on minimal vent settings; SBT as tolerated, continue duonebs   #ID- currently on zosyn for pseudomonas in sputum, awaiting sensitivities, plan for 7 day course  #Renal/metabolic - SHAGUFTA on CKD, likely due to ATN, non-oliguric; previously on bumex for volume overload but now off - pt appears euvolemic at this time and continues to have sufficient UOP and remains net even/negative; monitor I/Os and electrolytes  #GI- continue TF, PPI  #Heme - incidentally found to have L cerebellar concerning for metastasis - was treated for testicular Ca and no clear evidence for recurrence, will need PET-CT once stabilized  #Endo - BG acceptable; noted thyroid nodule, evaluate once more stable  #PPx - HSQ 7500 q8  #Dispo- remains ventilator dependent in ICU; prognosis guarded; full code    Plan discussed w/ pt's sister hannah on the phone, all questions answered
Patient is a 69M with extensive history including metastatic testicular cancer (last chemo June 2024), developmental delay, seizure disorder on AED, schizophrenia, HTN, HLD, CKD3, obesity, and VAZQUEZ on CPAP (unknown settings) presenting initially with fall and seizures. After admission he had RRT for prolonged seizure episode requiring intubation with subsequent extubation and transfer to the medical floors. Over the last 24 hours the patient was noted to have fevers and increasing tachypnea and respiratory distress. He was intubated during an RRT 1/23 and returned to MICU for further care.      #Neuro - continue AED as per neuro   - spinal fixation hardware noted on imaging  - Patient sedated for vent synchrony - minimize sedation as able  #Cardiovascular - hypertensive with initial concern for flash pulmonary edema during RRT  - Monitor BP and avoid hypertension  #Pulmonary - acute hypoxemic respiratory failure in the setting of possible aspiration with fevers and tachypnea vs flash pulmonary edema  - Initially with Influenza A and seizure disorder with need for airway protection  - Extubated 1/19 and was on nocturnal AVAPs but required reintubation 1/23  - Continue airway clearance and monitoring suctioning requirements   - Continue mechanical ventilation and adjust settings as able - PEEP 10 and Fio2 80% this AM  - Check sputum culture  - Aspiration precautions  - Maintain O2 sat > 90%  - VAZQUEZ - reportedly on CPAP at home. Resume AVAPs when/if extubated. Prior to discharge will need to either have patient set up with a new machine for new settings  - Prior imaging noted with tracheobronchomalacia and a 4 mm nodule which will need outpatient follow-up  #Gastro - oropharyngeal dysphagia  - OGT for feeds and medications  - Protonix  #Nephro - CKD3  - Monitor urine output - now on Bumex drip and monitor serial labs  - Nephrology follow-up  #Infectious Disease - RLL consolidation previously now improved on POCUS  - Continue broad spectrum antibiotics and follow-up cultures  - Completed course of Tamiflu  - Completed course of Ceftriaxone for CAP initially  - ID follow-up  #Psych - continue home medications  #Onc - Metastatic testicular cancer  - CTH without evidence of brain mets  #DVT proph - HSQ  #GOC - Full Code  - Patient has family involved  - Reportedly not under the auspices of [facility] - this was previously evaluated by SW team  - Palliative care evaluation given 2nd intubation    Long term prognosis guarded.
1. Acute hypoxemic respiratory failure in setting of influenza A and status epilepticus. Pt intubated for airway protection. Tolerating PS wean. Awake following commands. Trial of extubation.  Will need BiPAP or CPAP nocturnally for VAZQUEZ.   2. Neuro; Status epilepticus: Pt with h/o seizure disorder. Received extra Keppra and loaded with Vimpat. Continue  current AED regimen.   Off   all sedation. Awake.  Follows commands. No further seizures on current AED  regimen. propofol. Taper down Precedex  as tolerated. . Not responding to verbal stimuli. Minimal response to painful stimuli.  Follow EEG.  3.ID.  Influenza with RLL consolidation. ? pneumonia.  Continue Tamiflu and ceftriaxone.  Check MRSA nasal swab.  4. FEN: Increase LR to 100cc/hr. start tube feeds.  5. Psych. Schizophrenia: Continue Lurasidone 40mg daily,                Depression: Continue Lexapro  6. Hypotension. Continue Levophed for MAP > 65  7. Renal . SHAGUFTA on ckd3 ? ATN. increase fluids to 100cc/hr. Follow Uo and creatinine  8. ONC; metastatic testicular  cancer. Last treatment June 2024. CT head without evidence of brain mets.   9. DVT prophylaxis: sq heparin  10: [facility]; Full code.
Patient is a 69M with extensive history including metastatic testicular cancer (last chemo June 2024), developmental delay, seizure disorder on AED, schizophrenia, HTN, HLD, CKD3, obesity, and VAZQUEZ on CPAP (unknown settings) presenting initially with fall and seizures. After admission he had RRT for prolonged seizure episode requiring intubation with subsequent extubation and transfer to the medical floors. Over the last 24 hours the patient was noted to have fevers and increasing tachypnea and respiratory distress. He was intubated during an RRT 1/23 and returned to MICU for further care.      #Neuro - continue AED   - spinal fixation hardware - on MRI noted to have some abnormalities and potentially cause of patient's prior back pain - Ortho-spine eval pending  - Patient sedated for vent synchrony - minimize sedation as able  - MRI orbits notes a brain lesion - possibly metastatic though uncertain. Will need outpatient follow-up  - Thyroid nodule noted - TIRADS 5 - possible FNA once clinically stable vs outpatient follow-up  #Cardiovascular - hypertensive with initial concern for flash pulmonary edema during RRT now improved  - Monitor BP and avoid hypertension  #Pulmonary - acute hypoxemic respiratory failure in the setting of possible aspiration with fevers and tachypnea vs flash pulmonary edema  - Initially with Influenza A and seizure disorder with need for airway protection  - Extubated 1/19 and was on nocturnal AVAPs but required reintubation 1/23  - Continue airway clearance and monitoring suctioning requirements   - Continue mechanical ventilation and adjust settings as able - doing well on PSV this AM (PEEP 5, FiO2 30%)  - Aspiration precautions  - Maintain O2 sat > 90%  - VAZQUEZ - reportedly on CPAP at home. Resume AVAPs when/if extubated. Prior to discharge will need to either have patient set up with a new machine for new settings  - Prior imaging noted with tracheobronchomalacia and a 4 mm nodule which will need outpatient follow-up. Will eventually need CT chest with contrast to evaluate mediastinal LN and to help clarify discrepancy between MRI findings (possible 4 cm lung lesion vs CT findings of 4 mm lesion). Will ask radiology to review and compare  #Gastro - oropharyngeal dysphagia  - OGT for feeds and medications  - Protonix  #Nephro - SHAGUFTA on CKD3  - Good urine output and no longer appearing volume overloaded - hopefully diuretic phase of ATN  - Will keep off diuretics for now  - Nephrology follow-up  #Infectious Disease - RLL consolidation previously now improved on POCUS  - Continue broad spectrum antibiotics and follow-up cultures  - Completed course of Tamiflu  - Completed course of Ceftriaxone for CAP initially  - ID follow-up - has tolerated Zosyn now and on prior administrations - plan for 7 day course  #Psych - continue home medications  #Onc - Metastatic testicular cancer  - MRI with possible brain met - this potentially could be trigger for initial seizures (?)  - Oncology evaluation noted  - Will need outpatient PET/CT  - Started on Allopurinol for elevated uric acid  #DVT proph - HSQ  #GOC - Full Code  - Reportedly not under the auspices of [facility] - this was previously evaluated by SW team  - Palliative care evaluation    Long term prognosis guarded.
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 Admitted s/p fall, course complicated by seizures and intubated for airway protection. Extubated on 1/19 and transferred to [facility]. On 1/23 intubated in setting of worsening respiratory distress and fever, found to have pseudomonas pneumonia. Extubated on 1/28.    #Neuro - awake and alert, doing well; continue current AEDs- keppra, vimpat, lamotrigine; noted to have lesion in L cerebellar peduncle concerning for metastatic focus but unclear source - will need workup once stabilized  #CV - HD stable  #Pulm - extubated yesterday and doing well, maintain SpO2 >92% on RA; continue duonebs   #ID- currently on zosyn for pseudomonas in sputum, pan-sensitive, plan for 7 day course- last day today - please d/c after today's doses  #Renal/metabolic - SHAGUFTA on CKD, likely due to ATN, worsening but non-oliguric; give gentle IVF today to see if any improvement in renal function, 1/2 NS @ 75 cc/hr, repeat BMP this afternoon; hypoK judiciously repleted; monitor I/Os and electrolytes; continue free water for hyperNa  #GI- NGT in place, speech/swallow evaluation pending  #Heme - incidentally found to have L cerebellar concerning for metastasis - was treated for testicular Ca and no clear evidence for recurrence, will need PET-CT once stabilized  #Endo - BG acceptable; noted thyroid nodule, evaluate once more stable  #PPx - HSQ 7500 q8  #Dispo- stable for transfer to medical floors today; prognosis guarded; full code
Patient is a 69M with extensive history including metastatic testicular cancer (last chemo June 2024), developmental delay, seizure disorder on AED, schizophrenia, HTN, HLD, CKD3, obesity, and VAZQUEZ on CPAP (unknown settings) presenting initially with fall and seizures. After admission he had RRT for prolonged seizure episode requiring intubation with subsequent extubation and transfer to the medical floors. Over the last 24 hours the patient was noted to have fevers and increasing tachypnea and respiratory distress. He was intubated during an RRT 1/23 and returned to MICU for further care.      #Neuro - continue AED as per neuro   - spinal fixation hardware - on MRI noted to have some abnormalities and potentially cause of patient's prior back pain - will ask for spine evaluation  - Patient sedated for vent synchrony - minimize sedation as able  - MRI orbits notes a brain lesion - possibly metastatic - will need Hem/Onc evaluation  - Thyroid nodule noted - TIRADS 5 - possible FNA once clinically stable vs outpatient follow-up  - MICU team will notify patient's sister about all these findings which do raise concern for metastatic disease  #Cardiovascular - hypertensive with initial concern for flash pulmonary edema during RRT now improved  - Monitor BP and avoid hypertension  #Pulmonary - acute hypoxemic respiratory failure in the setting of possible aspiration with fevers and tachypnea vs flash pulmonary edema  - Initially with Influenza A and seizure disorder with need for airway protection  - Extubated 1/19 and was on nocturnal AVAPs but required reintubation 1/23  - Continue airway clearance and monitoring suctioning requirements   - Continue mechanical ventilation and adjust settings as able - PEEP decreased to 5 (from 8) and was on 30% FiO2 this AM  - Aspiration precautions  - Maintain O2 sat > 90%  - VAZQUEZ - reportedly on CPAP at home. Resume AVAPs when/if extubated. Prior to discharge will need to either have patient set up with a new machine for new settings  - Prior imaging noted with tracheobronchomalacia and a 4 mm nodule which will need outpatient follow-up  #Gastro - oropharyngeal dysphagia  - OGT for feeds and medications  - Protonix  #Nephro - SHAGUFTA on CKD3  - Good urine output and no longer appearing volume overloaded - will d/c diuretics  - Nephrology follow-up  #Infectious Disease - RLL consolidation previously now improved on POCUS  - Continue broad spectrum antibiotics and follow-up cultures  - Completed course of Tamiflu  - Completed course of Ceftriaxone for CAP initially  - ID follow-up - has tolerated Zosyn now and on prior administrations  #Psych - continue home medications  #Onc - Metastatic testicular cancer  - MRI with possible brain met - this potentially could be trigger for initial seizures (?)  - Oncology evaluation  #DVT proph - HSQ  #GOC - Full Code  - Patient has family involved  - Reportedly not under the auspices of [facility] - this was previously evaluated by SW team  - Palliative care evaluation    Long term prognosis guarded.
1. Acute hypoxemic respiratory failure in setting of influenza A and status epilepticus. Pt intubated for airway protection.  Continue current AC vent settings.  2. Neuro; Status epilepticus: Pt with h/o seizure disorder. Received extra Keppra and loaded with Vimpat. Continue  current AED regimen.   Off propofol. Taper down Precedex  as tolerated. . Not responding to verbal stimuli. Minimal response to painful stimuli.  Follow EEG.  3.ID.  Influenza with RLL consolidation. ? pneumonia.  Continue Tamiflu and ceftriaxone.  Check MRSA nasal swab.  4. FEN: Increase LR to 100cc/hr. start tube feeds.  5. Psych. Schizophrenia: Continue Lurasidone 40mg daily,                Depression: Continue Lexapro  6. Hypotension. Continue Levophed for MAP > 65  7. Renal . SHAGUFTA on ckd3 ? ATN. increase fluids to 100cc/hr. Follow Uo and creatinine  8. ONC; metastatic testicular  cancer. Last treatment June 2024. CT head without evidence of brain mets.   9. DVT prophylaxis: sq heparin  10: GOC; Full code.
1. Acute hypoxemic respiratory failure in setting of influenza A and status epilepticus. Tolerating extubation. Still with moderate amounts of oral secretions requiring frequent suctioning.  VAZQUEZ. Will need Nocturnal BiPAP 18/10.   2. Neuro; Status epilepticus: Pt with h/o seizure disorder. Received extra Keppra and loaded with Vimpat. Continue  current AED regimen.   Off   all sedation. Awake.  Follows commands. No further seizures on current AED .   3.ID.  Influenza with RLL consolidation. ? pneumonia.  Continue Tamiflu and ceftriaxone.    4. FEN: Increase LR to 100cc/hr. start tube feeds.  5. Psych. Schizophrenia: Continue Lurasidone 40mg daily,                Depression: Continue Lexapro  6. Hypotension. Continue Levophed for MAP > 65  7. Renal . SHAGUFTA on ckd3 ? ATN. increase fluids to 100cc/hr. Follow Uo and creatinine  8. ONC; metastatic testicular  cancer. Last treatment June 2024. CT head without evidence of brain mets.   9. DVT prophylaxis: sq heparin  10: GOC; Full code.

## 2025-01-29 NOTE — PROGRESS NOTE ADULT - SUBJECTIVE AND OBJECTIVE BOX
DATE OF SERVICE: 01-29-25 @ 09:04    Patient is a 69y old  Male who presents with a chief complaint of seizure, flu, elevated trop (29 Jan 2025 08:44)      INTERVAL HISTORY: extubated yesterday    REVIEW OF SYSTEMS:  CONSTITUTIONAL: No weakness  EYES/ENT: No visual changes;  No throat pain   NECK: No pain or stiffness  RESPIRATORY: No cough, wheezing; No shortness of breath  CARDIOVASCULAR: No chest pain or palpitations  GASTROINTESTINAL: No abdominal  pain. No nausea, vomiting, or hematemesis  GENITOURINARY: No dysuria, frequency or hematuria  NEUROLOGICAL: No stroke like symptoms  SKIN: No rashes    TELEMETRY Personally reviewed:  	  MEDICATIONS:        PHYSICAL EXAM:  T(C): 36.6 (01-29-25 @ 04:00), Max: 37.2 (01-28-25 @ 12:00)  HR: 82 (01-29-25 @ 09:01) (72 - 97)  BP: 130/69 (01-29-25 @ 07:00) (90/52 - 139/73)  RR: 20 (01-29-25 @ 07:00) (15 - 24)  SpO2: 92% (01-29-25 @ 09:01) (91% - 100%)  Wt(kg): --  I&O's Summary    28 Jan 2025 07:01  -  29 Jan 2025 07:00  --------------------------------------------------------  IN: 1960 mL / OUT: 900 mL / NET: 1060 mL      Height (cm): 167.6 (01-29 @ 00:00)  Weight (kg): 95.5 (01-29 @ 00:00)  BMI (kg/m2): 34 (01-29 @ 00:00)  BSA (m2): 2.04 (01-29 @ 00:00)    Appearance: In no distress	  HEENT:    PERRL, EOMI	  Cardiovascular:  S1 S2, No JVD  Respiratory: Lungs clear to auscultation	  Gastrointestinal:  Soft, Non-tender, + BS	  Vascularature:  No edema of LE  Psychiatric: Appropriate affect   Neuro: no acute focal deficits                               9.9    7.01  )-----------( 215      ( 29 Jan 2025 00:40 )             31.9     01-29    147[H]  |  105  |  127[H]  ----------------------------<  146[H]  3.3[L]   |  20[L]  |  5.08[H]    Ca    9.5      29 Jan 2025 00:39  Phos  9.3     01-29  Mg     3.5     01-29    TPro  6.9  /  Alb  3.4  /  TBili  0.4  /  DBili  x   /  AST  63[H]  /  ALT  67[H]  /  AlkPhos  142[H]  01-29        Labs personally reviewed      ASSESSMENT/PLAN: 	      69M w/ hx of metastatic testicular cancer (s/p L orchiectomy, on etoposide/cisplatin chemo, last dose 6/2024), epilepsy (grand-mal seizures), schizophrenia, developmental delay, HTN, HLD, VAZQUEZ, stage III CKD, obesity, secondary hyperparathyroidism, anemia, thrombocytopenia, ? spinal surgery who was admitted on 1/16/25 for fall and syncope. On 1/17 patient had multiple RRTs called for grand mal seizure activity and  patient had x2 more episode and was ultimately given ativan 2mg, Vimpat load 200mg, and intubated for airway protection with MICU transfer. Extubated 1/19 - to AVAPS, post extubation with increased secretion, now better; downgraded 1/21; While on medical floors; patient developed respiratory distress; now intubated for suspected aspiration pna vs flash pulmonary edema. Extubated 1/28/25.    Problem/Plan #1: Acute Hypoxic Respiratory Failure  - Intubated for airway protection i/s/o multiple RRTs for grand mal seizure activity, Extubated 1/19 to AVAPS  - Reintubated 1/23 after hypoxic episode for suspected aspiration pna vs flash pulmonary edema.   - BNP 3949  - CXR shows mild pulm edema on 1/16; CXR 1/25  Bibasilar atelectasis. Mild interstitial edema.. Improved aeration in the right upper lobe  - TTE with preserved EF, no WMA, dilated aortic root  - Was on Bumex IV - stopped on 1/25 due to ^ creatinine 4.31 from 3.92  - Monitor creat  - c/w IV Abx as per primary team  - Monitor strict I&Os, daily weights    Problem/Plan #2: Hypertensive Urgency  - BP now stable off anti-hypertensives. Will slowly re-implement as BP recovers.    Problem/Plan #3: HLD  - c/w atorvastatin 20mg daily    Problem/Plan #4: DVT Ppx  - c/w SQ heparin        TUAN Vanessa,  [facility]  Cardiovascular Medicine  [address] [place] [address], [address]  Available through call or text on [employer] TEAM[employer]  Office: [phone]     DATE OF SERVICE: 01-29-25 @ 09:04    Patient is a 69y old  Male who presents with a chief complaint of seizure, flu, elevated trop (29 Jan 2025 08:44)      INTERVAL HISTORY: extubated yesterday, feels okay; transferred to floors today    REVIEW OF SYSTEMS:   CONSTITUTIONAL: No weakness  EYES/ENT: No visual changes;  No throat pain   NECK: No pain or stiffness  RESPIRATORY: No cough, wheezing; No shortness of breath  CARDIOVASCULAR: No chest pain or palpitations  GASTROINTESTINAL: No abdominal  pain. No nausea, vomiting, or hematemesis  GENITOURINARY: No dysuria, frequency or hematuria  NEUROLOGICAL: No stroke like symptoms  SKIN: No rashes    TELEMETRY Personally reviewed: sinus 80s   	  MEDICATIONS:        PHYSICAL EXAM:  T(C): 36.6 (01-29-25 @ 04:00), Max: 37.2 (01-28-25 @ 12:00)  HR: 82 (01-29-25 @ 09:01) (72 - 97)  BP: 130/69 (01-29-25 @ 07:00) (90/52 - 139/73)  RR: 20 (01-29-25 @ 07:00) (15 - 24)  SpO2: 92% (01-29-25 @ 09:01) (91% - 100%)  Wt(kg): --  I&O's Summary    28 Jan 2025 07:01  -  29 Jan 2025 07:00  --------------------------------------------------------  IN: 1960 mL / OUT: 900 mL / NET: 1060 mL      Height (cm): 167.6 (01-29 @ 00:00)  Weight (kg): 95.5 (01-29 @ 00:00)  BMI (kg/m2): 34 (01-29 @ 00:00)  BSA (m2): 2.04 (01-29 @ 00:00)    Appearance: In no distress	  HEENT:    PERRL, EOMI	  Cardiovascular:  S1 S2, No JVD  Respiratory: Lungs clear to auscultation	  Gastrointestinal:  Soft, Non-tender, + BS	  Vascularature:  No edema of LE  Psychiatric: Appropriate affect   Neuro: no acute focal deficits                               9.9    7.01  )-----------( 215      ( 29 Jan 2025 00:40 )             31.9     01-29    147[H]  |  105  |  127[H]  ----------------------------<  146[H]  3.3[L]   |  20[L]  |  5.08[H]    Ca    9.5      29 Jan 2025 00:39  Phos  9.3     01-29  Mg     3.5     01-29    TPro  6.9  /  Alb  3.4  /  TBili  0.4  /  DBili  x   /  AST  63[H]  /  ALT  67[H]  /  AlkPhos  142[H]  01-29        Labs personally reviewed      ASSESSMENT/PLAN: 	      69M w/ hx of metastatic testicular cancer (s/p L orchiectomy, on etoposide/cisplatin chemo, last dose 6/2024), epilepsy (grand-mal seizures), schizophrenia, developmental delay, HTN, HLD, VAZQUEZ, stage III CKD, obesity, secondary hyperparathyroidism, anemia, thrombocytopenia, ? spinal surgery who was admitted on 1/16/25 for fall and syncope. On 1/17 patient had multiple RRTs called for grand mal seizure activity and  patient had x2 more episode and was ultimately given ativan 2mg, Vimpat load 200mg, and intubated for airway protection with MICU transfer. Extubated 1/19 - to AVAPS, post extubation with increased secretion, now better; downgraded 1/21; While on medical floors; patient developed respiratory distress; now intubated for suspected aspiration pna vs flash pulmonary edema. Extubated 1/28/25.    Problem/Plan #1: Acute Hypoxic Respiratory Failure  - Intubated for airway protection i/s/o multiple RRTs for grand mal seizure activity, Extubated 1/19 to AVAPS  - Reintubated 1/23 after hypoxic episode for suspected aspiration pna vs flash pulmonary edema.   - BNP 3949  - CXR shows mild pulm edema on 1/16; CXR 1/25  Bibasilar atelectasis. Mild interstitial edema.. Improved aeration in the right upper lobe  - TTE with preserved EF, no WMA, dilated aortic root  - Was on Bumex IV - stopped on 1/25 due to ^ creatinine 4.31 from 3.92  - Monitor creat  - c/w IV Abx as per primary team  - Monitor strict I&Os, daily weights    Problem/Plan #2: Hypertensive Urgency  - BP now stable off anti-hypertensives. Will slowly re-implement as BP recovers.    Problem/Plan #3: HLD  - c/w atorvastatin 20mg daily    Problem/Plan #4: DVT Ppx  - c/w SQ heparin        TUAN Vanessa, DO [facility]  Cardiovascular Medicine  [address] [place] [address], [address]  Available through call or text on [employer] TEAM[employer]  Office: [phone]     DATE OF SERVICE: 01-29-25 @ 09:04    Patient is a 69y old  Male who presents with a chief complaint of seizure, flu, elevated trop (29 Jan 2025 08:44)      INTERVAL HISTORY: extubated yesterday, feels okay; transferred to floors today    REVIEW OF SYSTEMS:   CONSTITUTIONAL: No weakness  EYES/ENT: No visual changes;  No throat pain   NECK: No pain or stiffness  RESPIRATORY: No cough, wheezing; No shortness of breath  CARDIOVASCULAR: No chest pain or palpitations  GASTROINTESTINAL: No abdominal  pain. No nausea, vomiting, or hematemesis  GENITOURINARY: No dysuria, frequency or hematuria  NEUROLOGICAL: No stroke like symptoms  SKIN: No rashes    TELEMETRY Personally reviewed: sinus 80s   	  MEDICATIONS:        PHYSICAL EXAM:  T(C): 36.6 (01-29-25 @ 04:00), Max: 37.2 (01-28-25 @ 12:00)  HR: 82 (01-29-25 @ 09:01) (72 - 97)  BP: 130/69 (01-29-25 @ 07:00) (90/52 - 139/73)  RR: 20 (01-29-25 @ 07:00) (15 - 24)  SpO2: 92% (01-29-25 @ 09:01) (91% - 100%)  Wt(kg): --  I&O's Summary    28 Jan 2025 07:01  -  29 Jan 2025 07:00  --------------------------------------------------------  IN: 1960 mL / OUT: 900 mL / NET: 1060 mL      Height (cm): 167.6 (01-29 @ 00:00)  Weight (kg): 95.5 (01-29 @ 00:00)  BMI (kg/m2): 34 (01-29 @ 00:00)  BSA (m2): 2.04 (01-29 @ 00:00)    Appearance: In no distress	  HEENT:    PERRL, EOMI	  Cardiovascular:  S1 S2, No JVD  Respiratory: Lungs clear to auscultation	  Gastrointestinal:  Soft, Non-tender, + BS	  Vascularature:  No edema of LE  Psychiatric: Appropriate affect   Neuro: no acute focal deficits                               9.9    7.01  )-----------( 215      ( 29 Jan 2025 00:40 )             31.9     01-29    147[H]  |  105  |  127[H]  ----------------------------<  146[H]  3.3[L]   |  20[L]  |  5.08[H]    Ca    9.5      29 Jan 2025 00:39  Phos  9.3     01-29  Mg     3.5     01-29    TPro  6.9  /  Alb  3.4  /  TBili  0.4  /  DBili  x   /  AST  63[H]  /  ALT  67[H]  /  AlkPhos  142[H]  01-29        Labs personally reviewed      ASSESSMENT/PLAN: 	    69M w/ hx of metastatic testicular cancer (s/p L orchiectomy, on etoposide/cisplatin chemo, last dose 6/2024), epilepsy (grand-mal seizures), schizophrenia, developmental delay, HTN, HLD, VAZQUEZ, stage III CKD, obesity, secondary hyperparathyroidism, anemia, thrombocytopenia, ? spinal surgery who was admitted on 1/16/25 for fall and syncope. On 1/17 patient had multiple RRTs called for grand mal seizure activity and  patient had x2 more episode and was ultimately given ativan 2mg, Vimpat load 200mg, and intubated for airway protection with MICU transfer. Extubated 1/19 - to AVAPS, post extubation with increased secretion, now better; downgraded 1/21; While on medical floors; patient developed respiratory distress; now intubated for suspected aspiration pna vs flash pulmonary edema. Extubated 1/28/25.    Problem/Plan #1: Acute Hypoxic Respiratory Failure  - Intubated for airway protection i/s/o multiple RRTs for grand mal seizure activity, Extubated 1/19 to AVAPS  - Reintubated 1/23 after hypoxic episode for suspected aspiration pna vs flash pulmonary edema.   - BNP 3949  - CXR shows mild pulm edema on 1/16; CXR 1/25  Bibasilar atelectasis. Mild interstitial edema.. Improved aeration in the right upper lobe  - TTE with preserved EF, no WMA, dilated aortic root  - Was on Bumex IV - stopped on 1/25 due to ^ creatinine 4.31 from 3.92  - Monitor creat  - c/w IV Abx as per primary team  - Monitor strict I&Os, daily weights    Problem/Plan #2: Hypertensive Urgency  - BP now stable off anti-hypertensives. Will slowly re-implement as BP recovers.    Problem/Plan #3: HLD  - c/w atorvastatin 20mg daily    Problem/Plan #4: DVT Ppx  - c/w SQ heparin        TUAN Vanessa, DO [facility]  Cardiovascular Medicine  [address] [place] [address], [address]  Available through call or text on [employer] TE[employer]  Office: [phone]

## 2025-01-29 NOTE — PROGRESS NOTE ADULT - ASSESSMENT
Mr. Gr is a 69 year old male with PMHx of seizure disorder, sleep apnea, HTN, chronic idiopathic constipation, stage III CKD, severe obesity, secondary hyperparathyroidism, anemia, thrombocytopenia, hyperlipidemia, testicular cancer s/p EP x 4 under the care of Dr. Ovalles who is admitted s/p fall in setting of possible seizure since 01/16/2025. He was intubated early in his admission due to seziures and extubated 1/21/2025 and then again intubated in setting of respiratory distress, requiring diuresis, with SHAGUFTA on CKD, in the MICU. Imaging shows possible brain metastatic disease and lung mass as well as abnormal thyroid nodule.     Former smoker, quit 14y prior, denied ETOH, drug use. Lives at home. Does not have children. Denied family history of blood disorders or malignancy.  Denied previous workplace related exposures.  Denied previous history of blood transfusions.  Denied history of thromboses.  Denied history of  requiring anticoagulation.        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  Margins were negative.  pT3Nx. CT C/A/P with few left para-aortic and left iliac chain lymph nodes largest measuring 8.1 cm left para-aortic node, bilateral subcentimeter noncalcified pulmonary nodules measuring up to 7 mm. AFP, LDH, hCG were all elevated. 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 Completed four cycles of adjuvant therapy with Cisplatin+Etoposide, with cisplatin dose reduced 50% and Etoposide 50% due to thrombocytopenia at that time. Subsequent scans 08/2024 showed resolution of disease and negative markers,         01/26/2025: continues intubated and sedated in the ICU, discussed with ICU team yesterday, pending markers, endocrinology eval noted     01/27/2025:  AFP/BHCG normal,     01/28/2025: continues in MICU intubated, off sedation, Neurology recs noted, L cerebellar lesion not likely be cause of seizure, GOC noted per palliative discussion, pts primary Oncologist aware of current events as well         # Brain lesion  # Seizures  - Seizures noted, pt with hx of seizures  - MRI brain now with 5.4 mm enhancing lesion in the LEFT middle cerebellar peduncle with associated edema suspicious for metastases  - MRI Lumbar negative for acute disease  - Small lesion without mass effect or edema, recommended to correlate per neurology if foci and locus are concordant with seizure activity  - Neurology recs noted, new lesion not likely to cause seizure  - It is rare, but nonetheless not impossible, for testicular cancer to be metastatic to the brain  - He will need another PET-CT, can be completed outpatient once stable if concern can proceed with biopsy at that time   - Endocrinology eval for thyroid nodule  - AFP/BHCG normal,         # Thyroid nodule  - US Thyroid 01/24/2025 with 1.6cm complex mixed solid cystic hypoechoic nodule in the lower pole of the right thyroid lobe, TIRADS 5.  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 Ovalles 12/03/2024, markers continued to be negative  - See above in regards to brain lesion  - MRI Neck shows 4.2 cm RIGHT upper lobe mass/infiltrate  - Recommend IR guided biopsy of RUL mass, currently intubated and sedate likely not possible, PET-CT as outpt and then consideration after better characterization   - Follow up as outpatient with Franki Ovalles MD     # Thrombocytopenia  - Labile  - Coags wnl, LFTs elevated  - Continue to trend  - Transfuse to maintain >10k, if actively bleeding then maintain >50k     # Acute kidney injury on CKD Stage IIIA  - Worsening    - Likely multifactorial at this point  - Nephrology consult and recs noted  - Continue to trend     # Leukocytosis, Neutrophilia  - Likely reactive, improved  - Continue to trend     # Normocytic anemia  - Chronic, has hx of PRBC during chemo 07/2024  - Noted Anti E, Anti-K Anti-C antibodies previously  - Monitor for bleeding  - Recommend to transfuse to maintain Hb > 7        Thank you for allowing me to participate in the care Mr. Gr, please do not hesitate to call or text me if you have further questions or concerns.        Brayan Mart MD  [facility] [place]   Division of Hematology/Oncology  [address], [address]  [place] [address]  P: [phone]  F: [phone]    Attestation:    ----Pt evalulated including face-to-face interaction in addition to chart review, reviewing treatment plan, and managing the patient’s chronic diagnoses as listed in the assessment----   Mr. Gr is a 69 year old male with PMHx of seizure disorder, sleep apnea, HTN, chronic idiopathic constipation, stage III CKD, severe obesity, secondary hyperparathyroidism, anemia, thrombocytopenia, hyperlipidemia, testicular cancer s/p EP x 4 under the care of Dr. Ovalles who is admitted s/p fall in setting of possible seizure since 01/16/2025. He was intubated early in his admission due to seziures and extubated 1/21/2025 and then again intubated in setting of respiratory distress, requiring diuresis, with SHAGUFTA on CKD, in the MICU. Imaging shows possible brain metastatic disease and lung mass as well as abnormal thyroid nodule.     Former smoker, quit 14y prior, denied ETOH, drug use. Lives at home. Does not have children. Denied family history of blood disorders or malignancy.  Denied previous workplace related exposures.  Denied previous history of blood transfusions.  Denied history of thromboses.  Denied history of  requiring anticoagulation.        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  Margins were negative.  pT3Nx. CT C/A/P with few left para-aortic and left iliac chain lymph nodes largest measuring 8.1 cm left para-aortic node, bilateral subcentimeter noncalcified pulmonary nodules measuring up to 7 mm. AFP, LDH, hCG were all elevated. 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 Completed four cycles of adjuvant therapy with Cisplatin+Etoposide, with cisplatin dose reduced 50% and Etoposide 50% due to thrombocytopenia at that time. Subsequent scans 08/2024 showed resolution of disease and negative markers,         01/26/2025: continues intubated and sedated in the ICU, discussed with ICU team yesterday, pending markers, endocrinology eval noted     01/27/2025:  AFP/BHCG normal,     01/28/2025: continues in MICU intubated, off sedation, Neurology recs noted, L cerebellar lesion not likely be cause of seizure, GOC noted per palliative discussion, pts primary Oncologist aware of current events as well     01/29/2025: , awake and alert, extubated 01/28/2025, continues in MICU         # Brain lesion  # Seizures  - Seizures noted, pt with hx of seizures  - MRI brain now with 5.4 mm enhancing lesion in the LEFT middle cerebellar peduncle with associated edema suspicious for metastases  - MRI Lumbar negative for acute disease  - Small lesion without mass effect or edema, recommended to correlate per neurology if foci and locus are concordant with seizure activity  - Neurology recs noted, new lesion not likely to cause seizure  - It is rare, but nonetheless not impossible, for testicular cancer to be metastatic to the brain  - He will need another PET-CT, can be completed outpatient once stable if concern can proceed with biopsy at that time   - Endocrinology eval for thyroid nodule  - AFP/BHCG normal,      # Thyroid nodule  - US Thyroid 01/24/2025 with 1.6cm complex mixed solid cystic hypoechoic nodule in the lower pole of the right thyroid lobe, TIRADS 5.  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 Ovalles 12/03/2024, markers continued to be negative  - See above in regards to brain lesion  - MRI Neck shows 4.2 cm RIGHT upper lobe mass/infiltrate  - Recommend IR guided biopsy of RUL mass, currently intubated and sedate likely not possible, PET-CT as outpt and then consideration after better characterization   - Follow up as outpatient with Franki Ovalles MD     # Thrombocytopenia  - Labile  - Coags wnl, LFTs elevated  - Continue to trend  - Transfuse to maintain >10k, if actively bleeding then maintain >50k     # Acute kidney injury on CKD Stage IIIA  - Worsening    - Likely multifactorial at this point  - Nephrology consult and recs noted  - Continue to trend     # Leukocytosis, Neutrophilia  - Likely reactive, improved  - Continue to trend     # Normocytic anemia  - Chronic, has hx of PRBC during chemo 07/2024  - Noted Anti E, Anti-K Anti-C antibodies previously  - Monitor for bleeding  - Recommend to transfuse to maintain Hb > 7        Thank you for allowing me to participate in the care Mr. Gr, please do not hesitate to call or text me if you have further questions or concerns.        Brayan Mart MD  [facility]   Division of Hematology/Oncology  [address] [place] [address], [address]  [place] [address]  P: [phone]  F: [phone]    Attestation:    ----Pt evalulated including face-to-face interaction in addition to chart review, reviewing treatment plan, and managing the patient’s chronic diagnoses as listed in the assessment----

## 2025-01-29 NOTE — PROGRESS NOTE ADULT - SUBJECTIVE AND OBJECTIVE BOX
[facility]EMAT[facility] INPATIENT PROGRESS NOTE     Interval Hx:   01-29-25: Mr. Gr was seen at bedside today.    Meds:   MEDICATIONS  (STANDING):  albuterol/ipratropium for Nebulization 3 milliLiter(s) Nebulizer every 6 hours  atorvastatin 20 milliGRAM(s) Oral at bedtime  bacitracin   Ointment 1 Application(s) Topical two times a day  chlorhexidine 2% Cloths 1 Application(s) Topical <User Schedule>  chlorhexidine 2% Cloths 1 Application(s) Topical <User Schedule>  escitalopram 15 milliGRAM(s) Oral daily  gabapentin Solution 150 milliGRAM(s) Oral two times a day  heparin   Injectable 7500 Unit(s) SubCutaneous every 8 hours  influenza  Vaccine (HIGH DOSE) 0.5 milliLiter(s) IntraMuscular once  lacosamide IVPB 100 milliGRAM(s) IV Intermittent every 12 hours  lamoTRIgine 100 milliGRAM(s) Oral two times a day  levETIRAcetam  IVPB 750 milliGRAM(s) IV Intermittent two times a day  lidocaine   4% Patch 1 Patch Transdermal daily  lurasidone 40 milliGRAM(s) Oral daily  pantoprazole  Injectable 40 milliGRAM(s) IV Push daily  piperacillin/tazobactam IVPB.. 3.375 Gram(s) IV Intermittent every 12 hours  polyethylene glycol 3350 17 Gram(s) Oral daily  senna Syrup 10 milliLiter(s) Oral at bedtime  sevelamer carbonate Powder 800 milliGRAM(s) Oral three times a day with meals    MEDICATIONS  (PRN):  nystatin Powder 1 Application(s) Topical three times a day PRN fungal infection you may see and want to treat    Vital Signs Last 24 Hrs  T(C): 36.6 (29 Jan 2025 04:00), Max: 37.2 (28 Jan 2025 12:00)  T(F): 97.8 (29 Jan 2025 04:00), Max: 99 (28 Jan 2025 12:00)  HR: 76 (29 Jan 2025 04:00) (75 - 97)  BP: 131/72 (29 Jan 2025 04:00) (90/52 - 139/73)  BP(mean): 96 (29 Jan 2025 04:00) (65 - 99)  RR: 17 (29 Jan 2025 04:00) (15 - 29)  SpO2: 100% (29 Jan 2025 04:00) (91% - 100%)    Parameters below as of 28 Jan 2025 20:00      O2 Concentration (%): 21    Physical Exam:  Gen: Intubated, sedated  HEENT: intubated  Chest: equal chest rise  Cardiac: +S1 S2  Abd: non distended   Neuro: sedated    Labs:                        9.9    7.01  )-----------( 215      ( 29 Jan 2025 00:40 )             31.9     CBC Full  -  ( 29 Jan 2025 00:40 )  WBC Count : 7.01 K/uL  RBC Count : 3.18 M/uL  Hemoglobin : 9.9 g/dL  Hematocrit : 31.9 %  Platelet Count - Automated : 215 K/uL  Mean Cell Volume : 100.3 fl  Mean Cell Hemoglobin : 31.1 pg  Mean Cell Hemoglobin Concentration : 31.0 g/dL  Auto Neutrophil # : 5.00 K/uL  Auto Lymphocyte # : 0.85 K/uL  Auto Monocyte # : 0.63 K/uL  Auto Eosinophil # : 0.27 K/uL  Auto Basophil # : 0.03 K/uL  Auto Neutrophil % : 71.3 %  Auto Lymphocyte % : 12.1 %  Auto Monocyte % : 9.0 %  Auto Eosinophil % : 3.9 %  Auto Basophil % : 0.4 %    01-29    147[H]  |  105  |  127[H]  ----------------------------<  146[H]  3.3[L]   |  20[L]  |  5.08[H]    Ca    9.5      29 Jan 2025 00:39  Phos  9.3     01-29  Mg     3.5     01-29    TPro  6.9  /  Alb  3.4  /  TBili  0.4  /  DBili  x   /  AST  63[H]  /  ALT  67[H]  /  AlkPhos  142[H]  01-29    PT/INR - ( 29 Jan 2025 00:40 )   PT: 11.1 sec;   INR: 0.97 ratio         PTT - ( 29 Jan 2025 00:40 )  PTT:39.3 sec  Bilirubin Total: 0.4 mg/dL (01-29-25 @ 00:39)  Bilirubin Total: 0.4 mg/dL (01-28-25 @ 15:25)   [facility]AT[facility] INPATIENT PROGRESS NOTE     Interval Hx:   01-29-25: Mr. Gr was seen at bedside today, awake and alert, extubated 01/28/2025, continues in MICU     Meds:   MEDICATIONS  (STANDING):  albuterol/ipratropium for Nebulization 3 milliLiter(s) Nebulizer every 6 hours  atorvastatin 20 milliGRAM(s) Oral at bedtime  bacitracin   Ointment 1 Application(s) Topical two times a day  chlorhexidine 2% Cloths 1 Application(s) Topical <User Schedule>  chlorhexidine 2% Cloths 1 Application(s) Topical <User Schedule>  escitalopram 15 milliGRAM(s) Oral daily  gabapentin Solution 150 milliGRAM(s) Oral two times a day  heparin   Injectable 7500 Unit(s) SubCutaneous every 8 hours  influenza  Vaccine (HIGH DOSE) 0.5 milliLiter(s) IntraMuscular once  lacosamide IVPB 100 milliGRAM(s) IV Intermittent every 12 hours  lamoTRIgine 100 milliGRAM(s) Oral two times a day  levETIRAcetam  IVPB 750 milliGRAM(s) IV Intermittent two times a day  lidocaine   4% Patch 1 Patch Transdermal daily  lurasidone 40 milliGRAM(s) Oral daily  pantoprazole  Injectable 40 milliGRAM(s) IV Push daily  piperacillin/tazobactam IVPB.. 3.375 Gram(s) IV Intermittent every 12 hours  polyethylene glycol 3350 17 Gram(s) Oral daily  senna Syrup 10 milliLiter(s) Oral at bedtime  sevelamer carbonate Powder 800 milliGRAM(s) Oral three times a day with meals    MEDICATIONS  (PRN):  nystatin Powder 1 Application(s) Topical three times a day PRN fungal infection you may see and want to treat    Vital Signs Last 24 Hrs  T(C): 36.6 (29 Jan 2025 04:00), Max: 37.2 (28 Jan 2025 12:00)  T(F): 97.8 (29 Jan 2025 04:00), Max: 99 (28 Jan 2025 12:00)  HR: 76 (29 Jan 2025 04:00) (75 - 97)  BP: 131/72 (29 Jan 2025 04:00) (90/52 - 139/73)  BP(mean): 96 (29 Jan 2025 04:00) (65 - 99)  RR: 17 (29 Jan 2025 04:00) (15 - 29)  SpO2: 100% (29 Jan 2025 04:00) (91% - 100%)    Parameters below as of 28 Jan 2025 20:00      O2 Concentration (%): 21    Physical Exam:  Gen: NAD  HEENT: EOMI, MMM  Chest: equal chest rise  Cardiac: +S1 S2  Abd: non distended   Neuro: AAOx2    Labs:                        9.9    7.01  )-----------( 215      ( 29 Jan 2025 00:40 )             31.9     CBC Full  -  ( 29 Jan 2025 00:40 )  WBC Count : 7.01 K/uL  RBC Count : 3.18 M/uL  Hemoglobin : 9.9 g/dL  Hematocrit : 31.9 %  Platelet Count - Automated : 215 K/uL  Mean Cell Volume : 100.3 fl  Mean Cell Hemoglobin : 31.1 pg  Mean Cell Hemoglobin Concentration : 31.0 g/dL  Auto Neutrophil # : 5.00 K/uL  Auto Lymphocyte # : 0.85 K/uL  Auto Monocyte # : 0.63 K/uL  Auto Eosinophil # : 0.27 K/uL  Auto Basophil # : 0.03 K/uL  Auto Neutrophil % : 71.3 %  Auto Lymphocyte % : 12.1 %  Auto Monocyte % : 9.0 %  Auto Eosinophil % : 3.9 %  Auto Basophil % : 0.4 %    01-29    147[H]  |  105  |  127[H]  ----------------------------<  146[H]  3.3[L]   |  20[L]  |  5.08[H]    Ca    9.5      29 Jan 2025 00:39  Phos  9.3     01-29  Mg     3.5     01-29    TPro  6.9  /  Alb  3.4  /  TBili  0.4  /  DBili  x   /  AST  63[H]  /  ALT  67[H]  /  AlkPhos  142[H]  01-29    PT/INR - ( 29 Jan 2025 00:40 )   PT: 11.1 sec;   INR: 0.97 ratio         PTT - ( 29 Jan 2025 00:40 )  PTT:39.3 sec  Bilirubin Total: 0.4 mg/dL (01-29-25 @ 00:39)  Bilirubin Total: 0.4 mg/dL (01-28-25 @ 15:25)

## 2025-01-29 NOTE — CHART NOTE - NSCHARTNOTEFT_GEN_A_CORE
MICU Transfer Note    Transfer from: MICU  Transfer to:  ( X ) Medicine    (  ) Telemetry    (  ) RCU    (  ) Palliative    (  ) Stroke Unit    (  ) _______________    Accepting physician:    HPI: 69M w/ hx of metastatic testicular cancer (s/p L orchiectomy, on etoposide/cisplatin chemo, last dose 6/2024), epilepsy (grand-mal seizures), schizophrenia, developmental delay, HTN, HLD, VAZQUEZ, stage III CKD, obesity, secondary hyperparathyroidism, anemia, thrombocytopenia, ? spinal surgery who was admitted on 1/16/25 for fall and syncope. On 1/17 patient had multiple RRTs called for grand mal seizure activity and  patient had x2 more episode and was ultimately given ativan 2mg, Vimpat load 200mg, and intubated for airway protection with MICU transfer.    MICU COURSE: Extubated 1/19 - to AVAPS, post extubation with increased secretion, now better; downgraded 1/21; While on medical floors; patient developed respiratory distress; re-intubated for suspected aspiration pna vs flash pulmonary edema with new fever and leukocytosis. Treated w/ Zosyn course ending 1/29. Re-extubated 1/28, NGT placed. S/S pending. Neuro signed off on 1/27, pt being continued on AEDs Keppra 1.5g BID, Lamotrigine 100mg BID, Gabapentin 300mg BID, Vimpat 100mg BID. c/b renal dysfunction suspected due to ischemic ATN - nephrology following, hasn't received HD.    For Follow-Up:  - S/S pending, follow-up recommendations. Pt remains on NGT with feeds.   - f/u Hypernatremia while eating. Receiving FWFs.   - Outpatient work-up for cerebellar lesions concerning for metastatic focus on MRI  - Work-up for thyroid nodule - biopsy inpatient vs. outpatient.   - CTM Cr, f/u Renal recs: re renal dysfunction - renal suspecting ischemic ATN.     ASSESSMENT & PLAN:   ===NEURO===  #Retubated 1/28/25    - Pain: PRN fentanyl 25mcg q4h, PRN Lidocaine patch 4%  - SATs as able      #Status Epilepticus  #Grand Mal Seizures  #?MRI with mets vs abnl lesion    - AEDs: Keppra 1.5g BID, Lamotrigine 100mg BID, Gabapentin 300mg BID, Vimpat 100mg BID  - S/p Ativan for seizures  - s/p vEEG negative for seizures  - s/p CTH negative for acute findings  - Neuro signed off 1/27/25  - Onc as below  - Once stabilized, need w/u for cerebellar lesion w/u (concerning for metastatic focus but unclear source)      #Schizophrenia  #Depression    - Escitalopram 15mg daily  - Lurasidone 40mg daily      ===CVS===  #HTN #HLD  #?Flash Pulmonary Edema  #Normal EF (62%)    - GOAL: Net negative, Normotension       #Hypotension    - s/p Norepi prn    ===PULM===  #Respiratory Failure s/t ?Edema vs PNA  #VAZQUEZ on CPAP at home-->?AVAPS  #Suspected aspiration PNA    - Duonebs 3mL q4h  - S/p 3% saline Nebs 4mL BID (D/C’ed 1/27/25)  - Antibiotics as below  - S/p Intubation 1/17 for status epilepticus -> S/p Extubation 1/19 to AVAPS -> re-intubated for suspected aspiration PNA 1/23 -> Extubation 1/28     ===RENAL===  #Metabolic Acidosis  #SHAGUFTA on CKD Stage III  #Electrolytes    - Monitor BP and urine output, if concerning may need fluids  - s/p bumex 4mg gtt with good effect  - sevelamer and rasburicase for phos/uric acid  - Nephro Dr. Dao Morris following.    ===GI===  #Diet #NG Tube  #Aspiration risk  #Bowel Regimen    - Tube feeds  - OG tube changed to NG tube 1/28/25  - IVF DS5 40cc/hr to be D/C’ed if NG tube is tolerated  - Pantoprazole 40mg daily  - Senna, Miralax      ===ID===  #?Aspiration Pneumonia  #Pneumonia RLL, CAP, treated  #FluA+, treated    - Zosyn 3.375g BID (1/23- planned 1/29) empirically, if rash start cefepime  - BCx: 1/23(NGTD), 1/19 (NGTD 4d)  - Sputum Cx: 1/25 pseudomonas, sensitive to Zosyn. 1/23 (No sufficient sputum), 1/18 (neg)  - Labs: MRSA (1/23 neg), Legionella (1/24 neg), Strep Ag (1/24 neg)  - S/p Ceftriaxone 1g daily (1/17-1/21) for CAP  - S/p Tamiflu 30mg BID (1/18-1/21) for FluA         ===ENDO===  #At risk of hypoglycemia  #Hx of secondary hyperparathyroidism  #Thyroid nodules    - FS q6h while NPO  - If more stable, consider thyroid nodule biopsy inpatient vs outpatient  - Endo signed off 1/27/25         ===HEME/ONC===  #Metastatic testicular cancer  #Last chemo 06/2024 (etoposide/cisplatin)  #S/p L. Orchiectomy  #New Neck Nodule: US/MRI with concern for thyroid cancer TIRADS 5  #New Lung Nodule: 4cm on MRI and additional nodule on CT 4mm  #New Brain Enhancing Lesion: 5.4mm lesion in white matter near ventricles    - Dr. Franki Ovalles ([facility]) aware (contacted for possible MRI showing mets to brain, lung, thyroid), will have colleague round  - Awaiting recommendations       #DVT ppx  - Heparin SQ 7500U       ===ETHICS===  #Code Status: FULL CODE.    - Palliative consulted, following    Vital Signs Last 24 Hrs  T(C): 36.6 (29 Jan 2025 04:00), Max: 37.2 (28 Jan 2025 12:00)  T(F): 97.8 (29 Jan 2025 04:00), Max: 99 (28 Jan 2025 12:00)  HR: 82 (29 Jan 2025 09:01) (72 - 97)  BP: 130/69 (29 Jan 2025 07:00) (90/52 - 139/73)  BP(mean): 91 (29 Jan 2025 07:00) (65 - 99)  RR: 20 (29 Jan 2025 07:00) (15 - 24)  SpO2: 92% (29 Jan 2025 09:01) (91% - 100%)    Parameters below as of 28 Jan 2025 20:00      O2 Concentration (%): 21  I&O's Summary    28 Jan 2025 07:01  -  29 Jan 2025 07:00  --------------------------------------------------------  IN: 1960 mL / OUT: 900 mL / NET: 1060 mL          MEDICATIONS  (STANDING):  albuterol/ipratropium for Nebulization 3 milliLiter(s) Nebulizer every 6 hours  atorvastatin 20 milliGRAM(s) Oral at bedtime  bacitracin   Ointment 1 Application(s) Topical two times a day  chlorhexidine 2% Cloths 1 Application(s) Topical <User Schedule>  chlorhexidine 2% Cloths 1 Application(s) Topical <User Schedule>  escitalopram 15 milliGRAM(s) Oral daily  gabapentin Solution 150 milliGRAM(s) Oral two times a day  heparin   Injectable 7500 Unit(s) SubCutaneous every 8 hours  influenza  Vaccine (HIGH DOSE) 0.5 milliLiter(s) IntraMuscular once  lacosamide IVPB 100 milliGRAM(s) IV Intermittent every 12 hours  lamoTRIgine 100 milliGRAM(s) Oral two times a day  levETIRAcetam  IVPB 750 milliGRAM(s) IV Intermittent two times a day  lidocaine   4% Patch 1 Patch Transdermal daily  lurasidone 40 milliGRAM(s) Oral daily  pantoprazole  Injectable 40 milliGRAM(s) IV Push daily  piperacillin/tazobactam IVPB.. 3.375 Gram(s) IV Intermittent every 12 hours  polyethylene glycol 3350 17 Gram(s) Oral daily  senna Syrup 10 milliLiter(s) Oral at bedtime  sevelamer carbonate Powder 800 milliGRAM(s) Oral three times a day with meals  sodium chloride 0.45%. 1000 milliLiter(s) (75 mL/Hr) IV Continuous <Continuous>    MEDICATIONS  (PRN):  nystatin Powder 1 Application(s) Topical three times a day PRN fungal infection you may see and want to treat        LABS                                            9.9                   Neurophils% (auto):   71.3   (01-29 @ 00:40):    7.01 )-----------(215          Lymphocytes% (auto):  12.1                                          31.9                   Eosinphils% (auto):   3.9      Manual%: Neutrophils x    ; Lymphocytes x    ; Eosinophils x    ; Bands%: x    ; Blasts x                                    147    |  105    |  127                 Calcium: 9.5   / iCa: x      (01-29 @ 00:39)    ----------------------------<  146       Magnesium: 3.5                              3.3     |  20     |  5.08             Phosphorous: 9.3      TPro  6.9    /  Alb  3.4    /  TBili  0.4    /  DBili  x      /  AST  63     /  ALT  67     /  AlkPhos  142    29 Jan 2025 00:39    ( 01-29 @ 00:40 )   PT: 11.1 sec;   INR: 0.97 ratio  aPTT: 39.3 sec MICU Transfer Note    Transfer from: MICU  Transfer to:  ( X ) Medicine    (  ) Telemetry    (  ) RCU    (  ) Palliative    (  ) Stroke Unit    (  ) _______________    Accepting physician: Dr. Cornell    HPI: 69M w/ hx of metastatic testicular cancer (s/p L orchiectomy, on etoposide/cisplatin chemo, last dose 6/2024), epilepsy (grand-mal seizures), schizophrenia, developmental delay, HTN, HLD, VAZQUEZ, stage III CKD, obesity, secondary hyperparathyroidism, anemia, thrombocytopenia, ? spinal surgery who was admitted on 1/16/25 for fall and syncope. On 1/17 patient had multiple RRTs called for grand mal seizure activity and  patient had x2 more episode and was ultimately given ativan 2mg, Vimpat load 200mg, and intubated for airway protection with MICU transfer.    MICU COURSE: Extubated 1/19 - to AVAPS, post extubation with increased secretion, now better; downgraded 1/21; While on medical floors; patient developed respiratory distress; re-intubated for suspected aspiration pna vs flash pulmonary edema with new fever and leukocytosis. Treated w/ Zosyn course ending 1/29. Re-extubated 1/28, NGT placed. S/S pending. Neuro signed off on 1/27, pt being continued on AEDs Keppra 1.5g BID, Lamotrigine 100mg BID, Gabapentin 300mg BID, Vimpat 100mg BID. c/b renal dysfunction suspected due to ischemic ATN - nephrology following, hasn't received HD.    For Follow-Up:  - S/S pending, follow-up recommendations. Pt remains on NGT with feeds.   - f/u Hypernatremia while eating. Receiving FWFs.   - Outpatient work-up for cerebellar lesions concerning for metastatic focus on MRI  - Work-up for thyroid nodule - biopsy inpatient vs. outpatient.   - CTM Cr, f/u Renal recs: re renal dysfunction - renal suspecting ischemic ATN.   - F/u 1/29/25 @3pm BMP (currently on half saline 75cc/hr for 12 hours).    ASSESSMENT & PLAN:   ===NEURO===  #Retubated 1/28/25    - Pain: PRN fentanyl 25mcg q4h, PRN Lidocaine patch 4%  - SATs as able      #Status Epilepticus  #Grand Mal Seizures  #?MRI with mets vs abnl lesion    - AEDs: Keppra 1.5g BID, Lamotrigine 100mg BID, Gabapentin 300mg BID, Vimpat 100mg BID  - S/p Ativan for seizures  - s/p vEEG negative for seizures  - s/p CTH negative for acute findings  - Neuro signed off 1/27/25  - Onc as below  - Once stabilized, need w/u for cerebellar lesion w/u (concerning for metastatic focus but unclear source)      #Schizophrenia  #Depression    - Escitalopram 15mg daily  - Lurasidone 40mg daily      ===CVS===  #HTN #HLD  #?Flash Pulmonary Edema  #Normal EF (62%)    - GOAL: Net negative, Normotension       #Hypotension    - s/p Norepi prn    ===PULM===  #Respiratory Failure s/t ?Edema vs PNA  #VAZQUEZ on CPAP at home-->?AVAPS  #Suspected aspiration PNA    - Duonebs 3mL q4h  - S/p 3% saline Nebs 4mL BID (D/C’ed 1/27/25)  - Antibiotics as below  - S/p Intubation 1/17 for status epilepticus -> S/p Extubation 1/19 to AVAPS -> re-intubated for suspected aspiration PNA 1/23 -> Extubation 1/28     ===RENAL===  #Metabolic Acidosis  #SHAGUFTA on CKD Stage III  #Electrolytes    - Monitor BP and urine output, if concerning may need fluids  - s/p bumex 4mg gtt with good effect  - sevelamer and rasburicase for phos/uric acid  - Nephro Dr. Dao Morris following.    ===GI===  #Diet #NG Tube  #Aspiration risk  #Bowel Regimen    - Tube feeds  - OG tube changed to NG tube 1/28/25  - IVF DS5 40cc/hr to be D/C’ed if NG tube is tolerated  - Pantoprazole 40mg daily  - Senna, Miralax      ===ID===  #?Aspiration Pneumonia  #Pneumonia RLL, CAP, treated  #FluA+, treated    - Zosyn 3.375g BID (1/23- planned 1/29) empirically, if rash start cefepime  - BCx: 1/23(NGTD), 1/19 (NGTD 4d)  - Sputum Cx: 1/25 pseudomonas, sensitive to Zosyn. 1/23 (No sufficient sputum), 1/18 (neg)  - Labs: MRSA (1/23 neg), Legionella (1/24 neg), Strep Ag (1/24 neg)  - S/p Ceftriaxone 1g daily (1/17-1/21) for CAP  - S/p Tamiflu 30mg BID (1/18-1/21) for FluA         ===ENDO===  #At risk of hypoglycemia  #Hx of secondary hyperparathyroidism  #Thyroid nodules    - FS q6h while NPO  - If more stable, consider thyroid nodule biopsy inpatient vs outpatient  - Endo signed off 1/27/25         ===HEME/ONC===  #Metastatic testicular cancer  #Last chemo 06/2024 (etoposide/cisplatin)  #S/p L. Orchiectomy  #New Neck Nodule: US/MRI with concern for thyroid cancer TIRADS 5  #New Lung Nodule: 4cm on MRI and additional nodule on CT 4mm  #New Brain Enhancing Lesion: 5.4mm lesion in white matter near ventricles    - Dr. Franki Ovalles ([facility]) aware (contacted for possible MRI showing mets to brain, lung, thyroid), will have colleague round  - Awaiting recommendations       #DVT ppx  - Heparin SQ 7500U       ===ETHICS===  #Code Status: FULL CODE.    - Palliative consulted, following    Vital Signs Last 24 Hrs  T(C): 36.6 (29 Jan 2025 04:00), Max: 37.2 (28 Jan 2025 12:00)  T(F): 97.8 (29 Jan 2025 04:00), Max: 99 (28 Jan 2025 12:00)  HR: 82 (29 Jan 2025 09:01) (72 - 97)  BP: 130/69 (29 Jan 2025 07:00) (90/52 - 139/73)  BP(mean): 91 (29 Jan 2025 07:00) (65 - 99)  RR: 20 (29 Jan 2025 07:00) (15 - 24)  SpO2: 92% (29 Jan 2025 09:01) (91% - 100%)    Parameters below as of 28 Jan 2025 20:00      O2 Concentration (%): 21  I&O's Summary    28 Jan 2025 07:01  -  29 Jan 2025 07:00  --------------------------------------------------------  IN: 1960 mL / OUT: 900 mL / NET: 1060 mL          MEDICATIONS  (STANDING):  albuterol/ipratropium for Nebulization 3 milliLiter(s) Nebulizer every 6 hours  atorvastatin 20 milliGRAM(s) Oral at bedtime  bacitracin   Ointment 1 Application(s) Topical two times a day  chlorhexidine 2% Cloths 1 Application(s) Topical <User Schedule>  chlorhexidine 2% Cloths 1 Application(s) Topical <User Schedule>  escitalopram 15 milliGRAM(s) Oral daily  gabapentin Solution 150 milliGRAM(s) Oral two times a day  heparin   Injectable 7500 Unit(s) SubCutaneous every 8 hours  influenza  Vaccine (HIGH DOSE) 0.5 milliLiter(s) IntraMuscular once  lacosamide IVPB 100 milliGRAM(s) IV Intermittent every 12 hours  lamoTRIgine 100 milliGRAM(s) Oral two times a day  levETIRAcetam  IVPB 750 milliGRAM(s) IV Intermittent two times a day  lidocaine   4% Patch 1 Patch Transdermal daily  lurasidone 40 milliGRAM(s) Oral daily  pantoprazole  Injectable 40 milliGRAM(s) IV Push daily  piperacillin/tazobactam IVPB.. 3.375 Gram(s) IV Intermittent every 12 hours  polyethylene glycol 3350 17 Gram(s) Oral daily  senna Syrup 10 milliLiter(s) Oral at bedtime  sevelamer carbonate Powder 800 milliGRAM(s) Oral three times a day with meals  sodium chloride 0.45%. 1000 milliLiter(s) (75 mL/Hr) IV Continuous <Continuous>    MEDICATIONS  (PRN):  nystatin Powder 1 Application(s) Topical three times a day PRN fungal infection you may see and want to treat        LABS                                            9.9                   Neurophils% (auto):   71.3   (01-29 @ 00:40):    7.01 )-----------(215          Lymphocytes% (auto):  12.1                                          31.9                   Eosinphils% (auto):   3.9      Manual%: Neutrophils x    ; Lymphocytes x    ; Eosinophils x    ; Bands%: x    ; Blasts x                                    147    |  105    |  127                 Calcium: 9.5   / iCa: x      (01-29 @ 00:39)    ----------------------------<  146       Magnesium: 3.5                              3.3     |  20     |  5.08             Phosphorous: 9.3      TPro  6.9    /  Alb  3.4    /  TBili  0.4    /  DBili  x      /  AST  63     /  ALT  67     /  AlkPhos  142    29 Jan 2025 00:39    ( 01-29 @ 00:40 )   PT: 11.1 sec;   INR: 0.97 ratio  aPTT: 39.3 sec MICU Transfer Note    Transfer from: MICU  Transfer to:  ( X ) Medicine    (  ) Telemetry    (  ) RCU    (  ) Palliative    (  ) Stroke Unit    (  ) _______________    Accepting physician: Dr. Cornell  Bed [id number]    HPI: 69M w/ hx of metastatic testicular cancer (s/p L orchiectomy, on etoposide/cisplatin chemo, last dose 6/2024), epilepsy (grand-mal seizures), schizophrenia, developmental delay, HTN, HLD, VAZQUEZ, stage III CKD, obesity, secondary hyperparathyroidism, anemia, thrombocytopenia, ? spinal surgery who was admitted on 1/16/25 for fall and syncope. On 1/17 patient had multiple RRTs called for grand mal seizure activity and  patient had x2 more episode and was ultimately given ativan 2mg, Vimpat load 200mg, and intubated for airway protection with MICU transfer.    MICU COURSE: Extubated 1/19 - to AVAPS, post extubation with increased secretion, now better; downgraded 1/21; While on medical floors; patient developed respiratory distress; re-intubated for suspected aspiration pna vs flash pulmonary edema with new fever and leukocytosis. Treated w/ Zosyn course ending 1/29. Re-extubated 1/28, NGT placed. S/S pending. Neuro signed off on 1/27, pt being continued on AEDs Keppra 1.5g BID, Lamotrigine 100mg BID, Gabapentin 300mg BID, Vimpat 100mg BID. c/b renal dysfunction suspected due to ischemic ATN - nephrology following, hasn't received HD.    For Follow-Up:  - S/S saw pt. Pt remains on NGT with feeds. FEEES tomorrow.   - f/u Hypernatremia while eating. Receiving FWFs.   - Outpatient work-up for cerebellar lesions concerning for metastatic focus on MRI  - Work-up for thyroid nodule - biopsy inpatient vs. outpatient.   - CTM Cr, f/u Renal recs: re renal dysfunction - renal suspecting ischemic ATN.   - F/u 1/29/25 @3pm BMP (currently on half saline 75cc/hr for 12 hours).    ASSESSMENT & PLAN:   ===NEURO===  #Re-extubated 1/28/25    - Pain: PRN fentanyl 25mcg q4h, PRN Lidocaine patch 4%  - SATs as able      #Status Epilepticus  #Grand Mal Seizures  #?MRI with mets vs abnl lesion    - AEDs: Keppra 1.5g BID, Lamotrigine 100mg BID, Gabapentin 300mg BID, Vimpat 100mg BID  - S/p Ativan for seizures  - s/p vEEG negative for seizures  - s/p CTH negative for acute findings  - Neuro signed off 1/27/25  - Onc as below  - Once stabilized, need w/u for cerebellar lesion w/u (concerning for metastatic focus but unclear source)      #Schizophrenia  #Depression    - Escitalopram 15mg daily  - Lurasidone 40mg daily      ===CVS===  #HTN #HLD  #?Flash Pulmonary Edema  #Normal EF (62%)    - GOAL: Net negative, Normotension       #Hypotension    - s/p Norepi prn    ===PULM===  #Respiratory Failure s/t ?Edema vs PNA  #VAZQUEZ on CPAP at home-->?AVAPS  #Suspected aspiration PNA    - Duonebs 3mL q4h  - S/p 3% saline Nebs 4mL BID (D/C’ed 1/27/25)  - Antibiotics as below  - S/p Intubation 1/17 for status epilepticus -> S/p Extubation 1/19 to AVAPS -> re-intubated for suspected aspiration PNA 1/23 -> Extubation 1/28     ===RENAL===  #Metabolic Acidosis  #SHAGUFTA on CKD Stage III  #Electrolytes    - Monitor BP and urine output, if concerning may need fluids  - s/p bumex 4mg gtt with good effect  - sevelamer and rasburicase for phos/uric acid  - Nephro Dr. Dao Morris following.    ===GI===  #Diet #NG Tube  #Aspiration risk  #Bowel Regimen    - Tube feeds  - OG tube changed to NG tube 1/28/25  - IVF DS5 40cc/hr to be D/C’ed if NG tube is tolerated  - Pantoprazole 40mg daily  - Senna, Miralax      ===ID===  #?Aspiration Pneumonia  #Pneumonia RLL, CAP, treated  #FluA+, treated    - Zosyn 3.375g BID (1/23- planned 1/29) empirically, if rash start cefepime  - BCx: 1/23(NGTD), 1/19 (NGTD 4d)  - Sputum Cx: 1/25 pseudomonas, sensitive to Zosyn. 1/23 (No sufficient sputum), 1/18 (neg)  - Labs: MRSA (1/23 neg), Legionella (1/24 neg), Strep Ag (1/24 neg)  - S/p Ceftriaxone 1g daily (1/17-1/21) for CAP  - S/p Tamiflu 30mg BID (1/18-1/21) for FluA         ===ENDO===  #At risk of hypoglycemia  #Hx of secondary hyperparathyroidism  #Thyroid nodules    - FS q6h while NPO  - If more stable, consider thyroid nodule biopsy inpatient vs outpatient  - Endo signed off 1/27/25         ===HEME/ONC===  #Metastatic testicular cancer  #Last chemo 06/2024 (etoposide/cisplatin)  #S/p L. Orchiectomy  #New Neck Nodule: US/MRI with concern for thyroid cancer TIRADS 5  #New Lung Nodule: 4cm on MRI and additional nodule on CT 4mm  #New Brain Enhancing Lesion: 5.4mm lesion in white matter near ventricles    - Dr. Franki Ovalles ([facility]) aware (contacted for possible MRI showing mets to brain, lung, thyroid), will have colleague round  - Awaiting recommendations       #DVT ppx  - Heparin SQ 7500U       ===ETHICS===  #Code Status: FULL CODE.    - Palliative consulted, following    Vital Signs Last 24 Hrs  T(C): 36.6 (29 Jan 2025 04:00), Max: 37.2 (28 Jan 2025 12:00)  T(F): 97.8 (29 Jan 2025 04:00), Max: 99 (28 Jan 2025 12:00)  HR: 82 (29 Jan 2025 09:01) (72 - 97)  BP: 130/69 (29 Jan 2025 07:00) (90/52 - 139/73)  BP(mean): 91 (29 Jan 2025 07:00) (65 - 99)  RR: 20 (29 Jan 2025 07:00) (15 - 24)  SpO2: 92% (29 Jan 2025 09:01) (91% - 100%)    Parameters below as of 28 Jan 2025 20:00      O2 Concentration (%): 21  I&O's Summary    28 Jan 2025 07:01  - 29 Jan 2025 07:00  --------------------------------------------------------  IN: 1960 mL / OUT: 900 mL / NET: 1060 mL          MEDICATIONS  (STANDING):  albuterol/ipratropium for Nebulization 3 milliLiter(s) Nebulizer every 6 hours  atorvastatin 20 milliGRAM(s) Oral at bedtime  bacitracin   Ointment 1 Application(s) Topical two times a day  chlorhexidine 2% Cloths 1 Application(s) Topical <User Schedule>  chlorhexidine 2% Cloths 1 Application(s) Topical <User Schedule>  escitalopram 15 milliGRAM(s) Oral daily  gabapentin Solution 150 milliGRAM(s) Oral two times a day  heparin   Injectable 7500 Unit(s) SubCutaneous every 8 hours  influenza  Vaccine (HIGH DOSE) 0.5 milliLiter(s) IntraMuscular once  lacosamide IVPB 100 milliGRAM(s) IV Intermittent every 12 hours  lamoTRIgine 100 milliGRAM(s) Oral two times a day  levETIRAcetam  IVPB 750 milliGRAM(s) IV Intermittent two times a day  lidocaine   4% Patch 1 Patch Transdermal daily  lurasidone 40 milliGRAM(s) Oral daily  pantoprazole  Injectable 40 milliGRAM(s) IV Push daily  piperacillin/tazobactam IVPB.. 3.375 Gram(s) IV Intermittent every 12 hours  polyethylene glycol 3350 17 Gram(s) Oral daily  senna Syrup 10 milliLiter(s) Oral at bedtime  sevelamer carbonate Powder 800 milliGRAM(s) Oral three times a day with meals  sodium chloride 0.45%. 1000 milliLiter(s) (75 mL/Hr) IV Continuous <Continuous>    MEDICATIONS  (PRN):  nystatin Powder 1 Application(s) Topical three times a day PRN fungal infection you may see and want to treat        LABS                                            9.9                   Neurophils% (auto):   71.3   (01-29 @ 00:40):    7.01 )-----------(215          Lymphocytes% (auto):  12.1                                          31.9                   Eosinphils% (auto):   3.9      Manual%: Neutrophils x    ; Lymphocytes x    ; Eosinophils x    ; Bands%: x    ; Blasts x                                    147    |  105    |  127                 Calcium: 9.5   / iCa: x      (01-29 @ 00:39)    ----------------------------<  146       Magnesium: 3.5                              3.3     |  20     |  5.08             Phosphorous: 9.3      TPro  6.9    /  Alb  3.4    /  TBili  0.4    /  DBili  x      /  AST  63     /  ALT  67     /  AlkPhos  142    29 Jan 2025 00:39    ( 01-29 @ 00:40 )   PT: 11.1 sec;   INR: 0.97 ratio  aPTT: 39.3 sec MICU Transfer Note    Transfer from: MICU  Transfer to:  ( X ) Medicine    (  ) Telemetry    (  ) RCU    (  ) Palliative    (  ) Stroke Unit    (  ) _______________    Accepting physician: Dr. Cornell  Bed 8 [facility] 809D    HPI: 69M w/ hx of metastatic testicular cancer (s/p L orchiectomy, on etoposide/cisplatin chemo, last dose 6/2024), epilepsy (grand-mal seizures), schizophrenia, developmental delay, HTN, HLD, VAZQUZE, stage III CKD, obesity, secondary hyperparathyroidism, anemia, thrombocytopenia, ? spinal surgery who was admitted on 1/16/25 for fall and syncope. On 1/17 patient had multiple RRTs called for grand mal seizure activity and  patient had x2 more episode and was ultimately given ativan 2mg, Vimpat load 200mg, and intubated for airway protection with MICU transfer.    MICU COURSE: Extubated 1/19 - to AVAPS, post extubation with increased secretion, now better; downgraded 1/21; While on medical floors; patient developed respiratory distress; re-intubated for suspected aspiration pna vs flash pulmonary edema with new fever and leukocytosis. Treated w/ Zosyn course ending 1/29. Re-extubated 1/28, NGT placed. S/S pending. Neuro signed off on 1/27, pt being continued on AEDs Keppra 1.5g BID, Lamotrigine 100mg BID, Gabapentin 300mg BID, Vimpat 100mg BID. c/b renal dysfunction suspected due to ischemic ATN - nephrology following, hasn't received HD.    For Follow-Up:  - S/S saw pt. Pt remains on NGT with feeds. FEEES tomorrow.   - f/u Hypernatremia while eating. Receiving FWFs.   - Outpatient work-up for cerebellar lesions concerning for metastatic focus on MRI  - Work-up for thyroid nodule - biopsy inpatient vs. outpatient.   - CTM Cr, f/u Renal recs: re renal dysfunction - renal suspecting ischemic ATN.   - F/u 1/29/25 @3pm BMP (currently on half saline 75cc/hr for 12 hours).    ASSESSMENT & PLAN:   ===NEURO===  #Re-extubated 1/28/25    - Pain: PRN fentanyl 25mcg q4h, PRN Lidocaine patch 4%  - SATs as able      #Status Epilepticus  #Grand Mal Seizures  #?MRI with mets vs abnl lesion    - AEDs: Keppra 1.5g BID, Lamotrigine 100mg BID, Gabapentin 300mg BID, Vimpat 100mg BID  - S/p Ativan for seizures  - s/p vEEG negative for seizures  - s/p CTH negative for acute findings  - Neuro signed off 1/27/25  - Onc as below  - Once stabilized, need w/u for cerebellar lesion w/u (concerning for metastatic focus but unclear source)      #Schizophrenia  #Depression    - Escitalopram 15mg daily  - Lurasidone 40mg daily      ===CVS===  #HTN #HLD  #?Flash Pulmonary Edema  #Normal EF (62%)    - GOAL: Net negative, Normotension       #Hypotension    - s/p Norepi prn    ===PULM===  #Respiratory Failure s/t ?Edema vs PNA  #VAZQUEZ on CPAP at home-->?AVAPS  #Suspected aspiration PNA    - Duonebs 3mL q4h  - S/p 3% saline Nebs 4mL BID (D/C’ed 1/27/25)  - Antibiotics as below  - S/p Intubation 1/17 for status epilepticus -> S/p Extubation 1/19 to AVAPS -> re-intubated for suspected aspiration PNA 1/23 -> Extubation 1/28     ===RENAL===  #Metabolic Acidosis  #SHAGUFTA on CKD Stage III  #Electrolytes    - Monitor BP and urine output, if concerning may need fluids  - s/p bumex 4mg gtt with good effect  - sevelamer and rasburicase for phos/uric acid  - Nephro Dr. Dao Morris following.    ===GI===  #Diet #NG Tube  #Aspiration risk  #Bowel Regimen    - Tube feeds  - OG tube changed to NG tube 1/28/25  - IVF DS5 40cc/hr to be D/C’ed if NG tube is tolerated  - Pantoprazole 40mg daily  - Senna, Miralax      ===ID===  #?Aspiration Pneumonia  #Pneumonia RLL, CAP, treated  #FluA+, treated    - Zosyn 3.375g BID (1/23- planned 1/29) empirically, if rash start cefepime  - BCx: 1/23(NGTD), 1/19 (NGTD 4d)  - Sputum Cx: 1/25 pseudomonas, sensitive to Zosyn. 1/23 (No sufficient sputum), 1/18 (neg)  - Labs: MRSA (1/23 neg), Legionella (1/24 neg), Strep Ag (1/24 neg)  - S/p Ceftriaxone 1g daily (1/17-1/21) for CAP  - S/p Tamiflu 30mg BID (1/18-1/21) for FluA         ===ENDO===  #At risk of hypoglycemia  #Hx of secondary hyperparathyroidism  #Thyroid nodules    - FS q6h while NPO  - If more stable, consider thyroid nodule biopsy inpatient vs outpatient  - Endo signed off 1/27/25         ===HEME/ONC===  #Metastatic testicular cancer  #Last chemo 06/2024 (etoposide/cisplatin)  #S/p L. Orchiectomy  #New Neck Nodule: US/MRI with concern for thyroid cancer TIRADS 5  #New Lung Nodule: 4cm on MRI and additional nodule on CT 4mm  #New Brain Enhancing Lesion: 5.4mm lesion in white matter near ventricles    - Dr. Franki Ovalles ([facility]) aware (contacted for possible MRI showing mets to brain, lung, thyroid), will have colleague round  - Awaiting recommendations       #DVT ppx  - Heparin SQ 7500U       ===ETHICS===  #Code Status: FULL CODE.    - Palliative consulted, following    Vital Signs Last 24 Hrs  T(C): 36.6 (29 Jan 2025 04:00), Max: 37.2 (28 Jan 2025 12:00)  T(F): 97.8 (29 Jan 2025 04:00), Max: 99 (28 Jan 2025 12:00)  HR: 82 (29 Jan 2025 09:01) (72 - 97)  BP: 130/69 (29 Jan 2025 07:00) (90/52 - 139/73)  BP(mean): 91 (29 Jan 2025 07:00) (65 - 99)  RR: 20 (29 Jan 2025 07:00) (15 - 24)  SpO2: 92% (29 Jan 2025 09:01) (91% - 100%)    Parameters below as of 28 Jan 2025 20:00      O2 Concentration (%): 21  I&O's Summary    28 Jan 2025 07:01 - 29 Jan 2025 07:00  --------------------------------------------------------  IN: 1960 mL / OUT: 900 mL / NET: 1060 mL          MEDICATIONS  (STANDING):  albuterol/ipratropium for Nebulization 3 milliLiter(s) Nebulizer every 6 hours  atorvastatin 20 milliGRAM(s) Oral at bedtime  bacitracin   Ointment 1 Application(s) Topical two times a day  chlorhexidine 2% Cloths 1 Application(s) Topical <User Schedule>  chlorhexidine 2% Cloths 1 Application(s) Topical <User Schedule>  escitalopram 15 milliGRAM(s) Oral daily  gabapentin Solution 150 milliGRAM(s) Oral two times a day  heparin   Injectable 7500 Unit(s) SubCutaneous every 8 hours  influenza  Vaccine (HIGH DOSE) 0.5 milliLiter(s) IntraMuscular once  lacosamide IVPB 100 milliGRAM(s) IV Intermittent every 12 hours  lamoTRIgine 100 milliGRAM(s) Oral two times a day  levETIRAcetam  IVPB 750 milliGRAM(s) IV Intermittent two times a day  lidocaine   4% Patch 1 Patch Transdermal daily  lurasidone 40 milliGRAM(s) Oral daily  pantoprazole  Injectable 40 milliGRAM(s) IV Push daily  piperacillin/tazobactam IVPB.. 3.375 Gram(s) IV Intermittent every 12 hours  polyethylene glycol 3350 17 Gram(s) Oral daily  senna Syrup 10 milliLiter(s) Oral at bedtime  sevelamer carbonate Powder 800 milliGRAM(s) Oral three times a day with meals  sodium chloride 0.45%. 1000 milliLiter(s) (75 mL/Hr) IV Continuous <Continuous>    MEDICATIONS  (PRN):  nystatin Powder 1 Application(s) Topical three times a day PRN fungal infection you may see and want to treat        LABS                                            9.9                   Neurophils% (auto):   71.3   (01-29 @ 00:40):    7.01 )-----------(215          Lymphocytes% (auto):  12.1                                          31.9                   Eosinphils% (auto):   3.9      Manual%: Neutrophils x    ; Lymphocytes x    ; Eosinophils x    ; Bands%: x    ; Blasts x                                    147    |  105    |  127                 Calcium: 9.5   / iCa: x      (01-29 @ 00:39)    ----------------------------<  146       Magnesium: 3.5                              3.3     |  20     |  5.08             Phosphorous: 9.3      TPro  6.9    /  Alb  3.4    /  TBili  0.4    /  DBili  x      /  AST  63     /  ALT  67     /  AlkPhos  142    29 Jan 2025 00:39    ( 01-29 @ 00:40 )   PT: 11.1 sec;   INR: 0.97 ratio  aPTT: 39.3 sec MICU Transfer Note    Transfer from: MICU  Transfer to:  ( X ) Medicine    (  ) Telemetry    (  ) RCU    (  ) Palliative    (  ) Stroke Unit    (  ) _______________    Accepting physician: Dr. Cornell  Bed 8 [facility] 809D  ACP: Angélica Portillo    HPI: 69M w/ hx of metastatic testicular cancer (s/p L orchiectomy, on etoposide/cisplatin chemo, last dose 6/2024), epilepsy (grand-mal seizures), schizophrenia, developmental delay, HTN, HLD, VAZQUEZ, stage III CKD, obesity, secondary hyperparathyroidism, anemia, thrombocytopenia, ? spinal surgery who was admitted on 1/16/25 for fall and syncope. On 1/17 patient had multiple RRTs called for grand mal seizure activity and  patient had x2 more episode and was ultimately given ativan 2mg, Vimpat load 200mg, and intubated for airway protection with MICU transfer.    MICU COURSE: Extubated 1/19 - to AVAPS, post extubation with increased secretion, now better; downgraded 1/21; While on medical floors; patient developed respiratory distress; re-intubated for suspected aspiration pna vs flash pulmonary edema with new fever and leukocytosis. Treated w/ Zosyn course ending 1/29. Re-extubated 1/28, NGT placed. S/S pending. Neuro signed off on 1/27, pt being continued on AEDs Keppra 1.5g BID, Lamotrigine 100mg BID, Gabapentin 300mg BID, Vimpat 100mg BID. c/b renal dysfunction suspected due to ischemic ATN - nephrology following, hasn't received HD.    For Follow-Up:  - S/S saw pt. Pt remains on NGT with feeds. FEEES tomorrow.   - f/u Hypernatremia while eating. Receiving FWFs.   - Outpatient work-up for cerebellar lesions concerning for metastatic focus on MRI  - Work-up for thyroid nodule - biopsy inpatient vs. outpatient.   - CTM Cr, f/u Renal recs: re renal dysfunction - renal suspecting ischemic ATN.   - F/u 1/29/25 @3pm BMP (currently on half saline 75cc/hr for 12 hours).  - Watch hypokalemia (1/29 K 3.3 --> 20meq K --> K 3.3 --> ordered 10meq K x3).     ASSESSMENT & PLAN:   ===NEURO===  #Re-extubated 1/28/25    - Pain: PRN fentanyl 25mcg q4h, PRN Lidocaine patch 4%  - SATs as able      #Status Epilepticus  #Grand Mal Seizures  #?MRI with mets vs abnl lesion    - AEDs: Keppra 1.5g BID, Lamotrigine 100mg BID, Gabapentin 300mg BID, Vimpat 100mg BID  - S/p Ativan for seizures  - s/p vEEG negative for seizures  - s/p CTH negative for acute findings  - Neuro signed off 1/27/25  - Onc as below  - Once stabilized, need w/u for cerebellar lesion w/u (concerning for metastatic focus but unclear source)      #Schizophrenia  #Depression    - Escitalopram 15mg daily  - Lurasidone 40mg daily      ===CVS===  #HTN #HLD  #?Flash Pulmonary Edema  #Normal EF (62%)    - GOAL: Net negative, Normotension       #Hypotension    - s/p Norepi prn    ===PULM===  #Respiratory Failure s/t ?Edema vs PNA  #VAZQUEZ on CPAP at home-->?AVAPS  #Suspected aspiration PNA    - Duonebs 3mL q4h  - S/p 3% saline Nebs 4mL BID (D/C’ed 1/27/25)  - Antibiotics as below  - S/p Intubation 1/17 for status epilepticus -> S/p Extubation 1/19 to AVAPS -> re-intubated for suspected aspiration PNA 1/23 -> Extubation 1/28     ===RENAL===  #Metabolic Acidosis  #SHAGUFTA on CKD Stage III  #Electrolytes    - Monitor BP and urine output, if concerning may need fluids  - s/p bumex 4mg gtt with good effect  - sevelamer and rasburicase for phos/uric acid  - Nephro Dr. Dao Morris following.    ===GI===  #Diet #NG Tube  #Aspiration risk  #Bowel Regimen    - Tube feeds  - OG tube changed to NG tube 1/28/25  - IVF DS5 40cc/hr to be D/C’ed if NG tube is tolerated  - Pantoprazole 40mg daily  - Senna, Miralax      ===ID===  #?Aspiration Pneumonia  #Pneumonia RLL, CAP, treated  #FluA+, treated    - Zosyn 3.375g BID (1/23- planned 1/29) empirically, if rash start cefepime  - BCx: 1/23(NGTD), 1/19 (NGTD 4d)  - Sputum Cx: 1/25 pseudomonas, sensitive to Zosyn. 1/23 (No sufficient sputum), 1/18 (neg)  - Labs: MRSA (1/23 neg), Legionella (1/24 neg), Strep Ag (1/24 neg)  - S/p Ceftriaxone 1g daily (1/17-1/21) for CAP  - S/p Tamiflu 30mg BID (1/18-1/21) for FluA         ===ENDO===  #At risk of hypoglycemia  #Hx of secondary hyperparathyroidism  #Thyroid nodules    - FS q6h while NPO  - If more stable, consider thyroid nodule biopsy inpatient vs outpatient  - Endo signed off 1/27/25         ===HEME/ONC===  #Metastatic testicular cancer  #Last chemo 06/2024 (etoposide/cisplatin)  #S/p L. Orchiectomy  #New Neck Nodule: US/MRI with concern for thyroid cancer TIRADS 5  #New Lung Nodule: 4cm on MRI and additional nodule on CT 4mm  #New Brain Enhancing Lesion: 5.4mm lesion in white matter near ventricles    - Dr. Franki Ovalles ([facility]) aware (contacted for possible MRI showing mets to brain, lung, thyroid), will have colleague round  - Awaiting recommendations       #DVT ppx  - Heparin SQ 7500U       ===ETHICS===  #Code Status: FULL CODE.    - Palliative consulted, following    Vital Signs Last 24 Hrs  T(C): 36.6 (29 Jan 2025 04:00), Max: 37.2 (28 Jan 2025 12:00)  T(F): 97.8 (29 Jan 2025 04:00), Max: 99 (28 Jan 2025 12:00)  HR: 82 (29 Jan 2025 09:01) (72 - 97)  BP: 130/69 (29 Jan 2025 07:00) (90/52 - 139/73)  BP(mean): 91 (29 Jan 2025 07:00) (65 - 99)  RR: 20 (29 Jan 2025 07:00) (15 - 24)  SpO2: 92% (29 Jan 2025 09:01) (91% - 100%)    Parameters below as of 28 Jan 2025 20:00      O2 Concentration (%): 21  I&O's Summary    28 Jan 2025 07:01  -  29 Jan 2025 07:00  --------------------------------------------------------  IN: 1960 mL / OUT: 900 mL / NET: 1060 mL          MEDICATIONS  (STANDING):  albuterol/ipratropium for Nebulization 3 milliLiter(s) Nebulizer every 6 hours  atorvastatin 20 milliGRAM(s) Oral at bedtime  bacitracin   Ointment 1 Application(s) Topical two times a day  chlorhexidine 2% Cloths 1 Application(s) Topical <User Schedule>  chlorhexidine 2% Cloths 1 Application(s) Topical <User Schedule>  escitalopram 15 milliGRAM(s) Oral daily  gabapentin Solution 150 milliGRAM(s) Oral two times a day  heparin   Injectable 7500 Unit(s) SubCutaneous every 8 hours  influenza  Vaccine (HIGH DOSE) 0.5 milliLiter(s) IntraMuscular once  lacosamide IVPB 100 milliGRAM(s) IV Intermittent every 12 hours  lamoTRIgine 100 milliGRAM(s) Oral two times a day  levETIRAcetam  IVPB 750 milliGRAM(s) IV Intermittent two times a day  lidocaine   4% Patch 1 Patch Transdermal daily  lurasidone 40 milliGRAM(s) Oral daily  pantoprazole  Injectable 40 milliGRAM(s) IV Push daily  piperacillin/tazobactam IVPB.. 3.375 Gram(s) IV Intermittent every 12 hours  polyethylene glycol 3350 17 Gram(s) Oral daily  senna Syrup 10 milliLiter(s) Oral at bedtime  sevelamer carbonate Powder 800 milliGRAM(s) Oral three times a day with meals  sodium chloride 0.45%. 1000 milliLiter(s) (75 mL/Hr) IV Continuous <Continuous>    MEDICATIONS  (PRN):  nystatin Powder 1 Application(s) Topical three times a day PRN fungal infection you may see and want to treat        LABS                                            9.9                   Neurophils% (auto):   71.3   (01-29 @ 00:40):    7.01 )-----------(215          Lymphocytes% (auto):  12.1                                          31.9                   Eosinphils% (auto):   3.9      Manual%: Neutrophils x    ; Lymphocytes x    ; Eosinophils x    ; Bands%: x    ; Blasts x                                    147    |  105    |  127                 Calcium: 9.5   / iCa: x      (01-29 @ 00:39)    ----------------------------<  146       Magnesium: 3.5                              3.3     |  20     |  5.08             Phosphorous: 9.3      TPro  6.9    /  Alb  3.4    /  TBili  0.4    /  DBili  x      /  AST  63     /  ALT  67     /  AlkPhos  142    29 Jan 2025 00:39    ( 01-29 @ 00:40 )   PT: 11.1 sec;   INR: 0.97 ratio  aPTT: 39.3 sec

## 2025-01-29 NOTE — CHART NOTE - NSCHARTNOTEFT_GEN_A_CORE
MAR Accept Note    Transfer to: ( X ) Medicine  8M 855D    Accepting physician: Dr. Cornell    [facility]/MICU Course:    To Do: MAR Accept Note    Transfer to: ( X ) Medicine  8M 809D    Accepting physician: Dr. Cornell    HPI/MICU Course: 69M w/ hx of metastatic testicular cancer (s/p L orchiectomy, on etoposide/cisplatin chemo, last dose 6/2024), epilepsy (grand-mal seizures), schizophrenia, developmental delay, HTN, HLD, VAZQUEZ, stage III CKD, obesity, secondary hyperparathyroidism, anemia, thrombocytopenia, ? spinal surgery who was admitted on 1/16/25 for fall and syncope. On 1/17 patient had multiple RRTs called for grand mal seizure activity and  patient had x2 more episode and was ultimately given ativan 2mg, Vimpat load 200mg, and intubated for airway protection with MICU transfer.    MICU COURSE: Extubated 1/19 - to AVAPS, post extubation with increased secretion, now better; downgraded 1/21; While on medical floors; patient developed respiratory distress; re-intubated for suspected aspiration pna vs flash pulmonary edema with new fever and leukocytosis. Treated w/ Zosyn course ending 1/29. Re-extubated 1/28, NGT placed. S/S pending. Neuro signed off on 1/27, pt being continued on AEDs Keppra 1.5g BID, Lamotrigine 100mg BID, Gabapentin 300mg BID, Vimpat 100mg BID. c/b renal dysfunction suspected due to ischemic ATN - nephrology following, hasn't received HD.    To Do:  - S/S saw pt. Pt remains on NGT with feeds. FEEES tomorrow.   - f/u Hypernatremia while eating. Receiving FWFs.   - Outpatient work-up for cerebellar lesions concerning for metastatic focus on MRI  - Work-up for thyroid nodule - biopsy inpatient vs. outpatient.   - CTM Cr, f/u Renal recs: re renal dysfunction - renal suspecting ischemic ATN.   - F/u 1/29/25 @3pm BMP (currently on half saline 75cc/hr for 12 hours).  - Watch hypokalemia (1/29 K 3.3 --> 20meq K --> K 3.3 --> ordered 10meq K x3).

## 2025-01-29 NOTE — PROGRESS NOTE ADULT - ASSESSMENT
68 y/o M with PMH of metastatic testicular cancer (s/p L orchiectomy, on etoposide/cisplatin chemo, last dose 6/2024), epilepsy (grand-mal seizures), schizophrenia, developmental delay, HTN, HLD, VAZQUEZ, stage III CKD, severe obesity, secondary hyperparathyroidism, anemia, thrombocytopenia who presented to ED for fall, possible seizure prior. On 1/17 patient had multiple RRTs called for grand mal seizure activity and  patient had x2 more episode and was ultimately given ativan 2mg, Vimpat load 200mg, and intubated for airway protection with MICU transfer. Extubated 1/19 - to AVAPS, post extubation with increased secretion. Downgraded to medical floor 1/21. Pt still with fevers, s/p RRT 1/23 AM for fever, hypoxia.  AVAPS - O2 sats 98% but tachypneic to 40, tachycardic, pt endorsing SOB. RRT ---> intubated for respiratory failure. also febrile and Hypertensive. CKD      1- CKD III  with SHAGUFTA   2- CHF   3- fevers  4- tachycardia  5- respiratory failure  6- HTN     cr worsening  suspect ischemic ATN in setting of infection and hypotension   fluid status improved based on pocus by the primary team   off diuretics now   cr still quite elevated.  but pt is non oliguric yet bun is rising and concerning   gentle ivf hydration today 1/2 ns to 500 cc total  if renal function does not begin to improve will need dialysis   will need to monitor closely for onset uremia in this pt and may need dialysis shortly if renal function cont to deteriorate    zosyn 3,375 g iv  bid     d/w icu team when seen earlier

## 2025-01-29 NOTE — SWALLOW BEDSIDE ASSESSMENT ADULT - ADDITIONAL RECOMMENDATIONS
LTG: Pt will tolerate least restrictive diet without s/s penetration/aspiration.
GOAL: Patient will obtain safe and adequate nutrition/hydration/medications via least restrictive means.

## 2025-01-29 NOTE — PROGRESS NOTE ADULT - ASSESSMENT
Patient is a 69 year old male with PMH of metastatic testicular cancer (s/p L orchiectomy, on etoposide/cisplatin chemo, last dose 6/2024), epilepsy (grand-mal seizures), schizophrenia, developmental delay, HTN, HLD, VAZQUEZ, stage III CKD, severe obesity, secondary hyperparathyroidism, anemia, thrombocytopenia who presented to ED for fall, possible seizure prior. Patient reported cough for few weeks with no other respiratory symptoms.   Admitted for further work up for syncope, c/f seizure   Influenza A  1/17 s/p RRTs for grand mal seizure activity, s/p intubation and transfer to MICU  - 1/17 CT chest with anterior bowing of posterior tracheal wall suggestive of tracheomalacia, trace R pleural effusion   - MRSA PCR screen negative    - 1/18 sputum culture negative    - s/p extubation 1/19   - 1/20 Bcx negative    - s/p ceftriaxone 1/17-1/21   - s/p tamiflu 1/17-1/21    Sepsis/sirs, AHRF possible flash pulmonary edema iso uncontrolled HTN, c/f aspiration pneumonia   - 1/23 s/p RRT am for hypoxia/inc WOB, febrile last night 100.6F and now 101F this am, tachycardia, leukocytosis 11k   - repeat COVID/Flu/RSV with known influenza A-likely shedding    - reported occ dysuria, UA negative for pyuria   - 1/23 afternoon s/p RRT for inc WOB on AVAPS, s/p intubation and transfer to MICU, required pressor support  - MRSA PCR screen negative   - 1/24 afebrile overnight, WBC uptrending, weaned off pressor earlier in am, on zosyn   - 1/25 CXR with R infiltrate, Bcx remain NGTD, WBC down  - 1/25 Scx with rare Pseudomonas aeruginosa -pansensitive   - MRI brain with metastatic disease  - thyroid us with nodule  - 1/26 CT spine with no acute abn of spine, stable postop changes and hardware; noted with multifocal lung abn with bibasilar atelectasis or consolidation and patchy airspace opacities in the RUL  - 1/28 s/p extubation   - h/o penicillin allergy noted--tolerating zosyn     s/p cefepime 1/23  s/p zosyn 1/23    Recommendations:   Continue zosyn to complete 7d course today 1/29  Monitor temps/WBC  Aspiration precautions   Continue rest of care per primary team       Greyson Mart M.D.  [facility]  Available on [employer] TEAMS - *PREFERRED*  [phone]  After 5pm on weekdays and all day on weekends - please call [phone]     Thank you for consulting us and involving us in the management of this patients case. In addition to reviewing history, imaging, documents, labs, microbiology, took into account antibiotic stewardship, local antibiogram and infection control strategies and potential transmission issues.

## 2025-01-29 NOTE — SWALLOW BEDSIDE ASSESSMENT ADULT - SWALLOW EVAL: RECOMMENDED DIET
Patient has been prescribed a ZioPatch holter for 3 days.  Patient was instructed regarding the indication, function, care and prompt return of the ZioPatch holter monitor. The monitor, with S/N [id number],  was placed on the patient with instructions regarding care of the skin, electrodes, and monitor, as well as documentation in the patient diary. Patient demonstrated understanding of this information and agreed to call [employer] with further questions or concerns.  
Maintain NPO/NGT
NPO, with non-oral nutrition/hydration/medications.

## 2025-01-29 NOTE — PROGRESS NOTE ADULT - SUBJECTIVE AND OBJECTIVE BOX
[facility]   [phone]  RENAL FOLLOW UP NOTE  --------------------------------------------------------------------------------  Chief Complaint:    24 hour events/subjective:    seen earlier   extubated   alert     PAST HISTORY  --------------------------------------------------------------------------------  No significant changes to PMH, PSH, FHx, SHx, unless otherwise noted    ALLERGIES & MEDICATIONS  --------------------------------------------------------------------------------  Allergies    penicillin (Hives)  seasonal allergies (Unknown)    Intolerances    latex (Rash)    Standing Inpatient Medications  albuterol/ipratropium for Nebulization 3 milliLiter(s) Nebulizer every 6 hours  atorvastatin 20 milliGRAM(s) Oral at bedtime  bacitracin   Ointment 1 Application(s) Topical two times a day  chlorhexidine 2% Cloths 1 Application(s) Topical <User Schedule>  chlorhexidine 2% Cloths 1 Application(s) Topical <User Schedule>  escitalopram 15 milliGRAM(s) Oral daily  gabapentin Solution 150 milliGRAM(s) Oral two times a day  heparin   Injectable 7500 Unit(s) SubCutaneous every 8 hours  influenza  Vaccine (HIGH DOSE) 0.5 milliLiter(s) IntraMuscular once  lacosamide IVPB 100 milliGRAM(s) IV Intermittent every 12 hours  lamoTRIgine 100 milliGRAM(s) Oral two times a day  levETIRAcetam  IVPB 750 milliGRAM(s) IV Intermittent two times a day  lidocaine   4% Patch 1 Patch Transdermal daily  lurasidone 40 milliGRAM(s) Oral daily  pantoprazole  Injectable 40 milliGRAM(s) IV Push daily  piperacillin/tazobactam IVPB.. 3.375 Gram(s) IV Intermittent every 12 hours  polyethylene glycol 3350 17 Gram(s) Oral daily  potassium chloride   Solution 10 milliEquivalent(s) Oral every 2 hours  senna Syrup 10 milliLiter(s) Oral at bedtime  sevelamer carbonate Powder 800 milliGRAM(s) Oral three times a day with meals    PRN Inpatient Medications  nystatin Powder 1 Application(s) Topical three times a day PRN      REVIEW OF SYSTEMS  --------------------------------------------------------------------------------    Gen: denies  fevers/chills, or HA or dizziness  CVS: denies chest pain/palpitations  Resp: denies SOB/Cough  GI: Denies N/V/Abd pain  : Denies dysuria    VITALS/PHYSICAL EXAM  --------------------------------------------------------------------------------  T(C): 36.3 (01-29-25 @ 15:47), Max: 36.7 (01-29-25 @ 14:12)  HR: 77 (01-29-25 @ 15:47) (72 - 92)  BP: 97/65 (01-29-25 @ 15:47) (90/52 - 137/71)  RR: 20 (01-29-25 @ 15:47) (15 - 28)  SpO2: 94% (01-29-25 @ 15:47) (85% - 100%)  Wt(kg): --  Height (cm): 167.6 (01-29-25 @ 00:00)  Weight (kg): 95.5 (01-29-25 @ 00:00)  BMI (kg/m2): 34 (01-29-25 @ 00:00)  BSA (m2): 2.04 (01-29-25 @ 00:00)      01-28-25 @ 07:01  -  01-29-25 @ 07:00  --------------------------------------------------------  IN: 1960 mL / OUT: 900 mL / NET: 1060 mL    01-29-25 @ 07:01  -  01-29-25 @ 21:40  --------------------------------------------------------  IN: 780 mL / OUT: 425 mL / NET: 355 mL      Physical Exam:  		  Gen: extubated communicative  obese   	no jvd  	Pulm: decrease bs  no rales or ronchi or wheezing  	CV: tachy  S1S2; no rub  	Abd: hypoactive bs soft distended  	UE: Warm, no cyanosis  no clubbing,  no edema;   	LE: Warm, no cyanosis  no clubbing, no edema  	Neuro:  alert and oriented     LABS/STUDIES  --------------------------------------------------------------------------------              9.9    7.01  >-----------<  215      [01-29-25 @ 00:40]              31.9     146  |  106  |  117  ----------------------------<  135      [01-29-25 @ 11:56]  3.3   |  21  |  4.71        Ca     9.5     [01-29-25 @ 11:56]      Mg     3.6     [01-29-25 @ 11:56]      Phos  8.1     [01-29-25 @ 11:56]    TPro  7.1  /  Alb  3.4  /  TBili  0.3  /  DBili  x   /  AST  62  /  ALT  62  /  AlkPhos  139  [01-29-25 @ 11:56]    PT/INR: PT 11.1 , INR 0.97       [01-29-25 @ 00:40]  PTT: 39.3       [01-29-25 @ 00:40]      Creatinine Trend:  SCr 4.71 [01-29 @ 11:56]  SCr 5.08 [01-29 @ 00:39]  SCr 4.86 [01-28 @ 15:25]  SCr 4.80 [01-28 @ 00:17]  SCr 4.68 [01-27 @ 00:32]

## 2025-01-29 NOTE — SWALLOW BEDSIDE ASSESSMENT ADULT - COMMENTS
Neuro following: "vEEG with moderate generalized slowing and no evidence for focal slowing, epileptiform discharges, or seizures; MRI brain w and wo, reviewed: Mild periventricular and moderate deep and subcortical white matter ischemia. 5.4 mm enhancing lesion in the LEFT middle cerebellar peduncle with associated edema suspicious for metastases. "  Heme/onc following for h/o ca: "MRI brain now with 5.4 mm enhancing lesion in the LEFT middle cerebellar peduncle with associated edema suspicious for metastases; MRI Lumbar negative for acute disease; Small lesion without mass effect or edema, recommended to correlate per neurology if foci and locus are concordant with seizure activity; Neurology recs noted, new lesion not likely to cause seizure; MRI Neck shows 4.2 cm RIGHT upper lobe mass/infiltrate; Recommend IR guided biopsy of RUL mass"  Ortho consulted for ?spinal hardware failure: "CT scan reviewed. 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 Given construct type likely is former. No acute pathology."  Cardiology consulted for Hypoxia c/f Pulmonary Edema, Hypertensive Urgency  Nephrology consulted for Abn creatinine.

## 2025-01-29 NOTE — SWALLOW BEDSIDE ASSESSMENT ADULT - PHARYNGEAL PHASE
Intermittent stertor during the swallow. ?cough related to PO intake vs baseline cough/Delayed pharyngeal swallow/Decreased laryngeal elevation/Delayed cough post oral intake/Multiple swallows

## 2025-01-29 NOTE — SWALLOW BEDSIDE ASSESSMENT ADULT - SLP PERTINENT HISTORY OF CURRENT PROBLEM
69M w/ hx of metastatic testicular cancer (s/p L orchiectomy, on etoposide/cisplatin chemo, last dose 6/2024), epilepsy (grand-mal seizures), schizophrenia, developmental delay, HTN, HLD, VAZQUEZ, stage III CKD, obesity, secondary hyperparathyroidism, anemia, thrombocytopenia who was admitted on 1/16/25 for fall and syncope. On 1/17 patient had multiple RRTs called for grand mal seizure activity and was initially recommended sepsis workup and antipyretics given T103F, but patient had x2 more episode and was ultimately given ativan 2mg, vimpat load 200mg, and intubated for airway protection with MICU transfer.
Pt known to this service from earlier this admission, seen 1/21/25 for BSE and FEES with recommendations for soft bite-size and mildly thick liquids given penetration without retrieval of thin liquid trials.

## 2025-01-29 NOTE — SWALLOW BEDSIDE ASSESSMENT ADULT - ASR SWALLOW RECOMMEND DIAG
To objectively assess swallow function/FEES For more comprehensive assessment of swallow function given h/o dysphagia prior to re-intubation and c/f possible aspiration pna./FEES

## 2025-01-29 NOTE — SWALLOW BEDSIDE ASSESSMENT ADULT - SWALLOW EVAL: FUNCTIONAL LEVEL AT TIME OF EVAL
AA+Ox3; follows commands; hoarse vocal quality
Pt AAOx3, conversing appropriately. SLP fed PO trials for this evaluation

## 2025-01-29 NOTE — SWALLOW BEDSIDE ASSESSMENT ADULT - SLP GENERAL OBSERVATIONS
Pt received upright ~45 degrees in bed, AAOx3, O2 96% on RA. HOB repositioned upright. NGT in place. Pt noted with dried, dark red abrasions on upper and lower lips, which NEHEMIAS Sigala reported is 2/2 pt falling. Harsh vocal quality and dry cough noted at baseline.

## 2025-01-29 NOTE — SWALLOW BEDSIDE ASSESSMENT ADULT - MUCOSAL QUALITY
unremarkable
Dry with dried abrasions noted on upper and lower lips, which were noted to open while pt placed dentures in oral cavity. NEHEMIAS Sigala made aware and provided care to pt. SLP provided oral care via moistened oral swab set to suction with removal of thick white secretions from lingual surface.

## 2025-01-30 DIAGNOSIS — J18.9 PNEUMONIA, UNSPECIFIED ORGANISM: ICD-10-CM

## 2025-01-30 LAB
ALBUMIN SERPL ELPH-MCNC: 3.4 G/DL — SIGNIFICANT CHANGE UP (ref 3.3–5)
ALP SERPL-CCNC: 137 U/L — HIGH (ref 40–120)
ALT FLD-CCNC: 55 U/L — HIGH (ref 10–45)
ANION GAP SERPL CALC-SCNC: 16 MMOL/L — SIGNIFICANT CHANGE UP (ref 5–17)
APTT BLD: 26.7 SEC — SIGNIFICANT CHANGE UP (ref 24.5–35.6)
AST SERPL-CCNC: 50 U/L — HIGH (ref 10–40)
BASOPHILS # BLD AUTO: 0.03 K/UL — SIGNIFICANT CHANGE UP (ref 0–0.2)
BASOPHILS NFR BLD AUTO: 0.4 % — SIGNIFICANT CHANGE UP (ref 0–2)
BILIRUB SERPL-MCNC: 0.3 MG/DL — SIGNIFICANT CHANGE UP (ref 0.2–1.2)
BUN SERPL-MCNC: 117 MG/DL — HIGH (ref 7–23)
CALCIUM SERPL-MCNC: 9.6 MG/DL — SIGNIFICANT CHANGE UP (ref 8.4–10.5)
CHLORIDE SERPL-SCNC: 109 MMOL/L — HIGH (ref 96–108)
CO2 SERPL-SCNC: 22 MMOL/L — SIGNIFICANT CHANGE UP (ref 22–31)
CREAT SERPL-MCNC: 4.25 MG/DL — HIGH (ref 0.5–1.3)
EGFR: 15 ML/MIN/1.73M2 — LOW
EOSINOPHIL # BLD AUTO: 0.36 K/UL — SIGNIFICANT CHANGE UP (ref 0–0.5)
EOSINOPHIL NFR BLD AUTO: 5 % — SIGNIFICANT CHANGE UP (ref 0–6)
GLUCOSE BLDC GLUCOMTR-MCNC: 115 MG/DL — HIGH (ref 70–99)
GLUCOSE BLDC GLUCOMTR-MCNC: 118 MG/DL — HIGH (ref 70–99)
GLUCOSE BLDC GLUCOMTR-MCNC: 119 MG/DL — HIGH (ref 70–99)
GLUCOSE BLDC GLUCOMTR-MCNC: 130 MG/DL — HIGH (ref 70–99)
GLUCOSE SERPL-MCNC: 135 MG/DL — HIGH (ref 70–99)
HCT VFR BLD CALC: 31.2 % — LOW (ref 39–50)
HGB BLD-MCNC: 9.6 G/DL — LOW (ref 13–17)
IMM GRANULOCYTES NFR BLD AUTO: 1.8 % — HIGH (ref 0–0.9)
INR BLD: 0.87 RATIO — SIGNIFICANT CHANGE UP (ref 0.85–1.16)
LYMPHOCYTES # BLD AUTO: 0.77 K/UL — LOW (ref 1–3.3)
LYMPHOCYTES # BLD AUTO: 10.8 % — LOW (ref 13–44)
MAGNESIUM SERPL-MCNC: 3.6 MG/DL — HIGH (ref 1.6–2.6)
MCHC RBC-ENTMCNC: 30.8 G/DL — LOW (ref 32–36)
MCHC RBC-ENTMCNC: 31.5 PG — SIGNIFICANT CHANGE UP (ref 27–34)
MCV RBC AUTO: 102.3 FL — HIGH (ref 80–100)
MONOCYTES # BLD AUTO: 0.49 K/UL — SIGNIFICANT CHANGE UP (ref 0–0.9)
MONOCYTES NFR BLD AUTO: 6.8 % — SIGNIFICANT CHANGE UP (ref 2–14)
NEUTROPHILS # BLD AUTO: 5.38 K/UL — SIGNIFICANT CHANGE UP (ref 1.8–7.4)
NEUTROPHILS NFR BLD AUTO: 75.2 % — SIGNIFICANT CHANGE UP (ref 43–77)
NRBC # BLD: 0 /100 WBCS — SIGNIFICANT CHANGE UP (ref 0–0)
NRBC BLD-RTO: 0 /100 WBCS — SIGNIFICANT CHANGE UP (ref 0–0)
PHOSPHATE SERPL-MCNC: 5.2 MG/DL — HIGH (ref 2.5–4.5)
PLATELET # BLD AUTO: 237 K/UL — SIGNIFICANT CHANGE UP (ref 150–400)
POTASSIUM SERPL-MCNC: 3.9 MMOL/L — SIGNIFICANT CHANGE UP (ref 3.5–5.3)
POTASSIUM SERPL-SCNC: 3.9 MMOL/L — SIGNIFICANT CHANGE UP (ref 3.5–5.3)
PROT SERPL-MCNC: 6.8 G/DL — SIGNIFICANT CHANGE UP (ref 6–8.3)
PROTHROM AB SERPL-ACNC: 10 SEC — SIGNIFICANT CHANGE UP (ref 9.9–13.4)
RBC # BLD: 3.05 M/UL — LOW (ref 4.2–5.8)
RBC # FLD: 13.1 % — SIGNIFICANT CHANGE UP (ref 10.3–14.5)
SODIUM SERPL-SCNC: 147 MMOL/L — HIGH (ref 135–145)
WBC # BLD: 7.16 K/UL — SIGNIFICANT CHANGE UP (ref 3.8–10.5)
WBC # FLD AUTO: 7.16 K/UL — SIGNIFICANT CHANGE UP (ref 3.8–10.5)

## 2025-01-30 PROCEDURE — 71250 CT THORAX DX C-: CPT | Mod: 26

## 2025-01-30 RX ORDER — ACETAMINOPHEN 160 MG/5ML
650 SUSPENSION ORAL ONCE
Refills: 0 | Status: COMPLETED | OUTPATIENT
Start: 2025-01-30 | End: 2025-01-30

## 2025-01-30 RX ADMIN — ATORVASTATIN CALCIUM 20 MILLIGRAM(S): 80 TABLET, FILM COATED ORAL at 22:34

## 2025-01-30 RX ADMIN — Medication 1 APPLICATION(S): at 17:23

## 2025-01-30 RX ADMIN — Medication 7500 UNIT(S): at 05:11

## 2025-01-30 RX ADMIN — SEVELAMER CARBONATE 800 MILLIGRAM(S): 800 TABLET, FILM COATED ORAL at 08:47

## 2025-01-30 RX ADMIN — GABAPENTIN 150 MILLIGRAM(S): 800 TABLET ORAL at 04:52

## 2025-01-30 RX ADMIN — Medication 7500 UNIT(S): at 16:00

## 2025-01-30 RX ADMIN — Medication 7500 UNIT(S): at 04:48

## 2025-01-30 RX ADMIN — GABAPENTIN 150 MILLIGRAM(S): 800 TABLET ORAL at 17:23

## 2025-01-30 RX ADMIN — LIDOCAINE HYDROCHLORIDE 1 PATCH: 30 CREAM TOPICAL at 04:53

## 2025-01-30 RX ADMIN — Medication 7500 UNIT(S): at 22:34

## 2025-01-30 RX ADMIN — LEVETIRACETAM 400 MILLIGRAM(S): 750 TABLET, FILM COATED ORAL at 17:59

## 2025-01-30 RX ADMIN — Medication 1 APPLICATION(S): at 04:53

## 2025-01-30 RX ADMIN — LACOSAMIDE 120 MILLIGRAM(S): 200 TABLET, FILM COATED ORAL at 05:42

## 2025-01-30 RX ADMIN — LURASIDONE HYDROCHLORIDE 40 MILLIGRAM(S): 80 TABLET, FILM COATED ORAL at 11:28

## 2025-01-30 RX ADMIN — PANTOPRAZOLE 40 MILLIGRAM(S): 20 TABLET, DELAYED RELEASE ORAL at 11:29

## 2025-01-30 RX ADMIN — IPRATROPIUM BROMIDE AND ALBUTEROL SULFATE 3 MILLILITER(S): .5; 2.5 SOLUTION RESPIRATORY (INHALATION) at 11:30

## 2025-01-30 RX ADMIN — SEVELAMER CARBONATE 800 MILLIGRAM(S): 800 TABLET, FILM COATED ORAL at 12:02

## 2025-01-30 RX ADMIN — LAMOTRIGINE 100 MILLIGRAM(S): 100 TABLET ORAL at 17:23

## 2025-01-30 RX ADMIN — IPRATROPIUM BROMIDE AND ALBUTEROL SULFATE 3 MILLILITER(S): .5; 2.5 SOLUTION RESPIRATORY (INHALATION) at 17:23

## 2025-01-30 RX ADMIN — LAMOTRIGINE 100 MILLIGRAM(S): 100 TABLET ORAL at 04:53

## 2025-01-30 RX ADMIN — LEVETIRACETAM 400 MILLIGRAM(S): 750 TABLET, FILM COATED ORAL at 04:50

## 2025-01-30 RX ADMIN — SEVELAMER CARBONATE 800 MILLIGRAM(S): 800 TABLET, FILM COATED ORAL at 00:24

## 2025-01-30 RX ADMIN — LACOSAMIDE 120 MILLIGRAM(S): 200 TABLET, FILM COATED ORAL at 17:24

## 2025-01-30 RX ADMIN — POTASSIUM CHLORIDE 10 MILLIEQUIVALENT(S): 750 TABLET, EXTENDED RELEASE ORAL at 00:24

## 2025-01-30 RX ADMIN — IPRATROPIUM BROMIDE AND ALBUTEROL SULFATE 3 MILLILITER(S): .5; 2.5 SOLUTION RESPIRATORY (INHALATION) at 04:53

## 2025-01-30 RX ADMIN — SEVELAMER CARBONATE 800 MILLIGRAM(S): 800 TABLET, FILM COATED ORAL at 17:59

## 2025-01-30 RX ADMIN — ACETAMINOPHEN 650 MILLIGRAM(S): 160 SUSPENSION ORAL at 06:17

## 2025-01-30 RX ADMIN — PIPERACILLIN SODIUM AND TAZOBACTAM SODIUM 25 GRAM(S): 2; 250 INJECTION, POWDER, FOR SOLUTION INTRAVENOUS at 04:54

## 2025-01-30 RX ADMIN — ESCITALOPRAM 15 MILLIGRAM(S): 10 TABLET, FILM COATED ORAL at 11:29

## 2025-01-30 NOTE — SWALLOW FEES ASSESSMENT ADULT - DIAGNOSTIC IMPRESSIONS
69y M w/ PMHx of epilepsy (grand-mal seizures), schizophrenia, and developmental delay who p/f fall and syncope. Hospital course c/b intubation 2/2 seizures and re-intubation for asp PNA vs. flash pulm edema; now s/p extubation 1/28. Pt presents w/ an oropharyngeal dysphagia. The oral stage of swallow is characterized by mildly prolonged mastication w/ chewable solids and premature spillover across consistencies which results in laryngeal penetration before the swallow for mildly thick liquids via straw. The pharyngeal stage of swallow is primarily characterized by laryngeal penetration during the swallow w/ moderately thick liquids via cup and consecutive sips via straw. Trace residue remains within the laryngeal vestibule and is cleared w/ a cued cough/repeat swallow. Airway protection is improved w/ small, single sips via straw. Pt is not a candidate for strategies 2/2 impulsivity and limited carryover. Favor a conservative diet given multiple intubations w/ c/f aspiration PNA. This service will continue to follow Pt to monitor diet tolerance and provide dysphagia tx.     Disorders: reduced BOT to posterior pharyngeal wall contact, delay in trigger of the swallow reflex, reduced laryngeal closure, reduced supraglottic sensation

## 2025-01-30 NOTE — SWALLOW FEES ASSESSMENT ADULT - NS SWALLOW FEES REC ASPIR MON
Monitor for s/s aspiration/laryngeal penetration. If noted:  D/C p.o. intake, provide non-oral nutrition/hydration/meds, and contact this service @ [phone]/change of breathing pattern/cough/gurgly voice/fever/pneumonia/throat clearing/upper respiratory infection
change of breathing pattern/cough/gurgly voice/fever/pneumonia/throat clearing/upper respiratory infection

## 2025-01-30 NOTE — PROGRESS NOTE ADULT - SUBJECTIVE AND OBJECTIVE BOX
Date of Service: 01-30-25 @ 08:55    Patient is a 69y old  Male who presents with a chief complaint of seizure, flu, elevated trop (30 Jan 2025 04:23)      Any change in ROS:     MEDICATIONS  (STANDING):  albuterol/ipratropium for Nebulization 3 milliLiter(s) Nebulizer every 6 hours  atorvastatin 20 milliGRAM(s) Oral at bedtime  bacitracin   Ointment 1 Application(s) Topical two times a day  chlorhexidine 2% Cloths 1 Application(s) Topical <User Schedule>  chlorhexidine 2% Cloths 1 Application(s) Topical <User Schedule>  escitalopram 15 milliGRAM(s) Oral daily  gabapentin Solution 150 milliGRAM(s) Oral two times a day  heparin   Injectable 7500 Unit(s) SubCutaneous every 8 hours  influenza  Vaccine (HIGH DOSE) 0.5 milliLiter(s) IntraMuscular once  lacosamide IVPB 100 milliGRAM(s) IV Intermittent every 12 hours  lamoTRIgine 100 milliGRAM(s) Oral two times a day  levETIRAcetam  IVPB 750 milliGRAM(s) IV Intermittent two times a day  lidocaine   4% Patch 1 Patch Transdermal daily  lurasidone 40 milliGRAM(s) Oral daily  pantoprazole  Injectable 40 milliGRAM(s) IV Push daily  piperacillin/tazobactam IVPB.. 3.375 Gram(s) IV Intermittent every 12 hours  polyethylene glycol 3350 17 Gram(s) Oral daily  senna Syrup 10 milliLiter(s) Oral at bedtime  sevelamer carbonate Powder 800 milliGRAM(s) Oral three times a day with meals    MEDICATIONS  (PRN):  nystatin Powder 1 Application(s) Topical three times a day PRN fungal infection you may see and want to treat    Vital Signs Last 24 Hrs  T(C): 36.8 (30 Jan 2025 00:22), Max: 36.8 (30 Jan 2025 00:22)  T(F): 98.2 (30 Jan 2025 00:22), Max: 98.2 (30 Jan 2025 00:22)  HR: 78 (30 Jan 2025 00:22) (77 - 91)  BP: 115/73 (30 Jan 2025 00:22) (97/65 - 137/71)  BP(mean): 88 (29 Jan 2025 13:00) (83 - 96)  RR: 20 (30 Jan 2025 00:22) (18 - 28)  SpO2: 95% (30 Jan 2025 00:22) (85% - 95%)    Parameters below as of 30 Jan 2025 08:04  Patient On (Oxygen Delivery Method): nasal cannula        I&O's Summary    29 Jan 2025 07:01  -  30 Jan 2025 07:00  --------------------------------------------------------  IN: 780 mL / OUT: 1075 mL / NET: -295 mL          Physical Exam:   GENERAL: NAD, well-groomed, well-developed  HEENT: BREE/   Atraumatic, Normocephalic  ENMT: No tonsillar erythema, exudates, or enlargement; Moist mucous membranes, Good dentition, No lesions  NECK: Supple, No JVD, Normal thyroid  CHEST/LUNG: Clear to auscultaion, ; No rales, rhonchi, wheezing, or rubs  CVS: Regular rate and rhythm; No murmurs, rubs, or gallops  GI: : Soft, Nontender, Nondistended; Bowel sounds present  NERVOUS SYSTEM:  Alert & Oriented X3, Good concentration; Motor Strength 5/5 B/L upper and lower extremities; DTRs 2+ intact and symmetric  EXTREMITIES:  2+ Peripheral Pulses, No clubbing, cyanosis, or edema  LYMPH: No lymphadenopathy noted  SKIN: No rashes or lesions  ENDOCRINOLOGY: No Thyromegaly  PSYCH: Appropriate    Labs:  24, 21.0, 30.0, 30.0, 100                            9.6    7.16  )-----------( 237      ( 30 Jan 2025 07:15 )             31.2                         9.9    7.01  )-----------( 215      ( 29 Jan 2025 00:40 )             31.9                         9.7    6.50  )-----------( 192      ( 28 Jan 2025 00:17 )             30.0                         10.0   6.98  )-----------( 184      ( 27 Jan 2025 00:32 )             30.8     01-30    147[H]  |  109[H]  |  117[H]  ----------------------------<  135[H]  3.9   |  22  |  4.25[H]  01-29    146[H]  |  106  |  117[H]  ----------------------------<  135[H]  3.3[L]   |  21[L]  |  4.71[H]  01-29    147[H]  |  105  |  127[H]  ----------------------------<  146[H]  3.3[L]   |  20[L]  |  5.08[H]  01-28    148[H]  |  106  |  114[H]  ----------------------------<  123[H]  3.8   |  21[L]  |  4.86[H]  01-28    146[H]  |  103  |  113[H]  ----------------------------<  139[H]  3.8   |  18[L]  |  4.80[H]  01-27    143  |  98  |  92[H]  ----------------------------<  139[H]  3.6   |  20[L]  |  4.68[H]  01-26    142  |  100  |  97[H]  ----------------------------<  130[H]  3.8   |  19[L]  |  4.66[H]    Ca    9.6      30 Jan 2025 07:12  Ca    9.5      29 Jan 2025 11:56  Ca    9.5      29 Jan 2025 00:39  Ca    9.7      28 Jan 2025 15:25  Phos  5.2     01-30  Phos  8.1     01-29  Phos  9.3     01-29  Phos  9.4     01-28  Mg     3.6     01-30  Mg     3.6     01-29  Mg     3.5     01-29  Mg     3.5     01-28    TPro  6.8  /  Alb  3.4  /  TBili  0.3  /  DBili  x   /  AST  50[H]  /  ALT  55[H]  /  AlkPhos  137[H]  01-30  TPro  7.1  /  Alb  3.4  /  TBili  0.3  /  DBili  x   /  AST  62[H]  /  ALT  62[H]  /  AlkPhos  139[H]  01-29  TPro  6.9  /  Alb  3.4  /  TBili  0.4  /  DBili  x   /  AST  63[H]  /  ALT  67[H]  /  AlkPhos  142[H]  01-29  TPro  7.3  /  Alb  3.7  /  TBili  0.4  /  DBili  x   /  AST  67[H]  /  ALT  72[H]  /  AlkPhos  136[H]  01-28  TPro  6.8  /  Alb  3.1[L]  /  TBili  0.4  /  DBili  x   /  AST  65[H]  /  ALT  66[H]  /  AlkPhos  134[H]  01-28  TPro  6.9  /  Alb  3.3  /  TBili  0.5  /  DBili  x   /  AST  87[H]  /  ALT  92[H]  /  AlkPhos  155[H]  01-27    CAPILLARY BLOOD GLUCOSE      POCT Blood Glucose.: 118 mg/dL (30 Jan 2025 05:45)  POCT Blood Glucose.: 133 mg/dL (29 Jan 2025 23:18)  POCT Blood Glucose.: 137 mg/dL (29 Jan 2025 18:20)      LIVER FUNCTIONS - ( 30 Jan 2025 07:12 )  Alb: 3.4 g/dL / Pro: 6.8 g/dL / ALK PHOS: 137 U/L / ALT: 55 U/L / AST: 50 U/L / GGT: x           PT/INR - ( 30 Jan 2025 07:17 )   PT: 10.0 sec;   INR: 0.87 ratio         PTT - ( 30 Jan 2025 07:17 )  PTT:26.7 sec  Urinalysis Basic - ( 30 Jan 2025 07:12 )    Color: x / Appearance: x / SG: x / pH: x  Gluc: 135 mg/dL / Ketone: x  / Bili: x / Urobili: x   Blood: x / Protein: x / Nitrite: x   Leuk Esterase: x / RBC: x / WBC x   Sq Epi: x / Non Sq Epi: x / Bacteria: x            RECENT CULTURES:  01-25 @ 07:21 .Sputum Sputum   MARIELENA    Numerous polymorphonuclear leukocytes per low power field  Rare Squamous epithelial cells per low power field  No organisms seen    Pseudomonas aeruginosa  Pseudomonas aeruginosa     Rare Pseudomonas aeruginosa  Commensal lucia consistent with body site    01-24 @ 17:16 Catheterized Catheterized                No growth    01-23 @ 15:14 .Blood BLOOD                No growth at 5 days    01-23 @ 12:21 .Blood BLOOD                No growth at 5 days      Studies  < from: Xray Chest 1 View- PORTABLE-Urgent (Xray Chest 1 View- PORTABLE-Urgent .) (01.28.25 @ 17:44) >    ACC: [id number] EXAM:  XR CHEST PORTABLE URGENT 1V   ORDERED BY: MEREDITH GOULD     PROCEDURE DATE:  01/28/2025          INTERPRETATION:  EXAMINATION: XR CHEST URGENT    CLINICAL INDICATION: Evaluate line placement    TECHNIQUE: Single frontal view of the chest was obtained.    COMPARISON: Chest x-ray 1/25/2025.    FINDINGS:    Interval removal of endotracheal tube. Enteric tube overlies the stomach.   Right chest wall port with tip in the right atrium.  Bibasilar atelectasis. No focal consolidation, large pleural effusion or   pneumothorax.  The heart size cannot be accurately assessed on this projection.  No acute osseous abnormalities. Thoracolumbar spine hardware.    IMPRESSION:  Support devices as described above.    --- End of Report ---          < end of copied text >    < from: CT Chest No Cont (01.17.25 @ 20:09) >    ACC: [id number] EXAM:  CT CHEST   ORDERED BY: ELISE PINTO     PROCEDURE DATE:  01/17/2025          INTERPRETATION:  CLINICAL INFORMATION: Rapid response. Altered mental   status.    COMPARISON: CT chest 3/22/2024. PET/CT 8/6/2024.    CONTRAST/COMPLICATIONS:  IV Contrast: NONE  Oral Contrast: NONE    PROCEDURE:  CT scan of the chest was obtained without intravenous contrast.    FINDINGS:    AIRWAYS/LUNGS/PLEURA: Marked anterior bowing of the posterior tracheal   membrane, suggestive of tracheomalacia. Respiratory motion artifact   limits evaluation of the lung parenchyma. Unchanged 4 mm right   perifissural nodule (3:52). Scattered areas of subsegmental atelectasis   including at the lung apices. Trace right pleural effusion.    MEDIASTINUM AND HA: No lymphadenopathy.    VESSELS: Aortic calcifications. Coronary artery calcifications.    HEART: Cardiomegaly. No pericardial effusion.    VISUALIZED UPPER ABDOMEN: Bilateral renal cysts.    CHEST WALL AND BONES: Partially imaged thoracolumbar posterior fusion.   Stable widening of the posterior T10-T11 disc space with abrupt kyphotic   angulation. Right chest wall port with the tip in the right atrium.    IMPRESSION:    Anterior bowing of the posterior tracheal wall, suggestive of   tracheomalacia.    Trace right pleural effusion.    --- End of Report ---      < end of copied text >                 Date of Service: 01-30-25 @ 08:55    Patient is a 69y old  Male who presents with a chief complaint of seizure, flu, elevated trop (30 Jan 2025 04:23)      Any change in ROS:     O2 sats 95% on 5LNC  AVAPS qHS  Denies CP, SOB  Sister at bedside     MEDICATIONS  (STANDING):  albuterol/ipratropium for Nebulization 3 milliLiter(s) Nebulizer every 6 hours  atorvastatin 20 milliGRAM(s) Oral at bedtime  bacitracin   Ointment 1 Application(s) Topical two times a day  chlorhexidine 2% Cloths 1 Application(s) Topical <User Schedule>  chlorhexidine 2% Cloths 1 Application(s) Topical <User Schedule>  escitalopram 15 milliGRAM(s) Oral daily  gabapentin Solution 150 milliGRAM(s) Oral two times a day  heparin   Injectable 7500 Unit(s) SubCutaneous every 8 hours  influenza  Vaccine (HIGH DOSE) 0.5 milliLiter(s) IntraMuscular once  lacosamide IVPB 100 milliGRAM(s) IV Intermittent every 12 hours  lamoTRIgine 100 milliGRAM(s) Oral two times a day  levETIRAcetam  IVPB 750 milliGRAM(s) IV Intermittent two times a day  lidocaine   4% Patch 1 Patch Transdermal daily  lurasidone 40 milliGRAM(s) Oral daily  pantoprazole  Injectable 40 milliGRAM(s) IV Push daily  piperacillin/tazobactam IVPB.. 3.375 Gram(s) IV Intermittent every 12 hours  polyethylene glycol 3350 17 Gram(s) Oral daily  senna Syrup 10 milliLiter(s) Oral at bedtime  sevelamer carbonate Powder 800 milliGRAM(s) Oral three times a day with meals    MEDICATIONS  (PRN):  nystatin Powder 1 Application(s) Topical three times a day PRN fungal infection you may see and want to treat    Vital Signs Last 24 Hrs  T(C): 36.8 (30 Jan 2025 00:22), Max: 36.8 (30 Jan 2025 00:22)  T(F): 98.2 (30 Jan 2025 00:22), Max: 98.2 (30 Jan 2025 00:22)  HR: 78 (30 Jan 2025 00:22) (77 - 91)  BP: 115/73 (30 Jan 2025 00:22) (97/65 - 137/71)  BP(mean): 88 (29 Jan 2025 13:00) (83 - 96)  RR: 20 (30 Jan 2025 00:22) (18 - 28)  SpO2: 95% (30 Jan 2025 00:22) (85% - 95%)    Parameters below as of 30 Jan 2025 08:04  Patient On (Oxygen Delivery Method): nasal cannula        I&O's Summary    29 Jan 2025 07:01  -  30 Jan 2025 07:00  --------------------------------------------------------  IN: 780 mL / OUT: 1075 mL / NET: -295 mL          Physical Exam:   GENERAL: NAD  HEENT: BREE  ENMT: No tonsillar erythema, exudates, or enlargement  NECK: Supple, No JVD  CHEST/LUNG: Few scattered rhonchi, clears with cough   CVS: Regular rate and rhythm; No murmurs, rubs, or gallops  GI: : Soft, Nontender, Nondistended; Bowel sounds present  NERVOUS SYSTEM:  Alert & Oriented X3  EXTREMITIES:  2+ Peripheral Pulses, No clubbing, cyanosis, or edema  LYMPH: No lymphadenopathy noted  SKIN: No rashes or lesions  ENDOCRINOLOGY: No Thyromegaly  PSYCH: Appropriate    Labs:  24, 21.0, 30.0, 30.0, 100                            9.6    7.16  )-----------( 237      ( 30 Jan 2025 07:15 )             31.2                         9.9    7.01  )-----------( 215      ( 29 Jan 2025 00:40 )             31.9                         9.7    6.50  )-----------( 192      ( 28 Jan 2025 00:17 )             30.0                         10.0   6.98  )-----------( 184      ( 27 Jan 2025 00:32 )             30.8     01-30    147[H]  |  109[H]  |  117[H]  ----------------------------<  135[H]  3.9   |  22  |  4.25[H]  01-29    146[H]  |  106  |  117[H]  ----------------------------<  135[H]  3.3[L]   |  21[L]  |  4.71[H]  01-29    147[H]  |  105  |  127[H]  ----------------------------<  146[H]  3.3[L]   |  20[L]  |  5.08[H]  01-28    148[H]  |  106  |  114[H]  ----------------------------<  123[H]  3.8   |  21[L]  |  4.86[H]  01-28    146[H]  |  103  |  113[H]  ----------------------------<  139[H]  3.8   |  18[L]  |  4.80[H]  01-27    143  |  98  |  92[H]  ----------------------------<  139[H]  3.6   |  20[L]  |  4.68[H]  01-26    142  |  100  |  97[H]  ----------------------------<  130[H]  3.8   |  19[L]  |  4.66[H]    Ca    9.6      30 Jan 2025 07:12  Ca    9.5      29 Jan 2025 11:56  Ca    9.5      29 Jan 2025 00:39  Ca    9.7      28 Jan 2025 15:25  Phos  5.2     01-30  Phos  8.1     01-29  Phos  9.3     01-29  Phos  9.4     01-28  Mg     3.6     01-30  Mg     3.6     01-29  Mg     3.5     01-29  Mg     3.5     01-28    TPro  6.8  /  Alb  3.4  /  TBili  0.3  /  DBili  x   /  AST  50[H]  /  ALT  55[H]  /  AlkPhos  137[H]  01-30  TPro  7.1  /  Alb  3.4  /  TBili  0.3  /  DBili  x   /  AST  62[H]  /  ALT  62[H]  /  AlkPhos  139[H]  01-29  TPro  6.9  /  Alb  3.4  /  TBili  0.4  /  DBili  x   /  AST  63[H]  /  ALT  67[H]  /  AlkPhos  142[H]  01-29  TPro  7.3  /  Alb  3.7  /  TBili  0.4  /  DBili  x   /  AST  67[H]  /  ALT  72[H]  /  AlkPhos  136[H]  01-28  TPro  6.8  /  Alb  3.1[L]  /  TBili  0.4  /  DBili  x   /  AST  65[H]  /  ALT  66[H]  /  AlkPhos  134[H]  01-28  TPro  6.9  /  Alb  3.3  /  TBili  0.5  /  DBili  x   /  AST  87[H]  /  ALT  92[H]  /  AlkPhos  155[H]  01-27    CAPILLARY BLOOD GLUCOSE      POCT Blood Glucose.: 118 mg/dL (30 Jan 2025 05:45)  POCT Blood Glucose.: 133 mg/dL (29 Jan 2025 23:18)  POCT Blood Glucose.: 137 mg/dL (29 Jan 2025 18:20)      LIVER FUNCTIONS - ( 30 Jan 2025 07:12 )  Alb: 3.4 g/dL / Pro: 6.8 g/dL / ALK PHOS: 137 U/L / ALT: 55 U/L / AST: 50 U/L / GGT: x           PT/INR - ( 30 Jan 2025 07:17 )   PT: 10.0 sec;   INR: 0.87 ratio         PTT - ( 30 Jan 2025 07:17 )  PTT:26.7 sec  Urinalysis Basic - ( 30 Jan 2025 07:12 )    Color: x / Appearance: x / SG: x / pH: x  Gluc: 135 mg/dL / Ketone: x  / Bili: x / Urobili: x   Blood: x / Protein: x / Nitrite: x   Leuk Esterase: x / RBC: x / WBC x   Sq Epi: x / Non Sq Epi: x / Bacteria: x            RECENT CULTURES:  01-25 @ 07:21 .Sputum Sputum   MARIELENA    Numerous polymorphonuclear leukocytes per low power field  Rare Squamous epithelial cells per low power field  No organisms seen    Pseudomonas aeruginosa  Pseudomonas aeruginosa     Rare Pseudomonas aeruginosa  Commensal lucia consistent with body site    01-24 @ 17:16 Catheterized Catheterized                No growth    01-23 @ 15:14 .Blood BLOOD                No growth at 5 days    01-23 @ 12:21 .Blood BLOOD                No growth at 5 days      Studies  < from: Xray Chest 1 View- PORTABLE-Urgent (Xray Chest 1 View- PORTABLE-Urgent .) (01.28.25 @ 17:44) >    ACC: [id number] EXAM:  XR CHEST PORTABLE URGENT 1V   ORDERED BY: MEREDITH GOULD     PROCEDURE DATE:  01/28/2025          INTERPRETATION:  EXAMINATION: XR CHEST URGENT    CLINICAL INDICATION: Evaluate line placement    TECHNIQUE: Single frontal view of the chest was obtained.    COMPARISON: Chest x-ray 1/25/2025.    FINDINGS:    Interval removal of endotracheal tube. Enteric tube overlies the stomach.   Right chest wall port with tip in the right atrium.  Bibasilar atelectasis. No focal consolidation, large pleural effusion or   pneumothorax.  The heart size cannot be accurately assessed on this projection.  No acute osseous abnormalities. Thoracolumbar spine hardware.    IMPRESSION:  Support devices as described above.    --- End of Report ---          < end of copied text >    < from: CT Chest No Cont (01.17.25 @ 20:09) >    ACC: [id number] EXAM:  CT CHEST   ORDERED BY: ELISE PINTO     PROCEDURE DATE:  01/17/2025          INTERPRETATION:  CLINICAL INFORMATION: Rapid response. Altered mental   status.    COMPARISON: CT chest 3/22/2024. PET/CT 8/6/2024.    CONTRAST/COMPLICATIONS:  IV Contrast: NONE  Oral Contrast: NONE    PROCEDURE:  CT scan of the chest was obtained without intravenous contrast.    FINDINGS:    AIRWAYS/LUNGS/PLEURA: Marked anterior bowing of the posterior tracheal   membrane, suggestive of tracheomalacia. Respiratory motion artifact   limits evaluation of the lung parenchyma. Unchanged 4 mm right   perifissural nodule (3:52). Scattered areas of subsegmental atelectasis   including at the lung apices. Trace right pleural effusion.    MEDIASTINUM AND HA: No lymphadenopathy.    VESSELS: Aortic calcifications. Coronary artery calcifications.    HEART: Cardiomegaly. No pericardial effusion.    VISUALIZED UPPER ABDOMEN: Bilateral renal cysts.    CHEST WALL AND BONES: Partially imaged thoracolumbar posterior fusion.   Stable widening of the posterior T10-T11 disc space with abrupt kyphotic   angulation. Right chest wall port with the tip in the right atrium.    IMPRESSION:    Anterior bowing of the posterior tracheal wall, suggestive of   tracheomalacia.    Trace right pleural effusion.    --- End of Report ---      < end of copied text >

## 2025-01-30 NOTE — PROGRESS NOTE ADULT - ASSESSMENT
Mr. Gr is a 69 year old male with PMHx of seizure disorder, sleep apnea, HTN, chronic idiopathic constipation, stage III CKD, severe obesity, secondary hyperparathyroidism, anemia, thrombocytopenia, hyperlipidemia, testicular cancer s/p EP x 4 under the care of Dr. Ovalles who is admitted s/p fall in setting of possible seizure since 01/16/2025. He was intubated early in his admission due to seziures and extubated 1/21/2025 and then again intubated in setting of respiratory distress, requiring diuresis, with SHAGUFTA on CKD, in the MICU. Imaging shows possible brain metastatic disease and lung mass as well as abnormal thyroid nodule.     Former smoker, quit 14y prior, denied ETOH, drug use. Lives at home. Does not have children. Denied family history of blood disorders or malignancy.  Denied previous workplace related exposures.  Denied previous history of blood transfusions.  Denied history of thromboses.  Denied history of  requiring anticoagulation.        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  Margins were negative.  pT3Nx. CT C/A/P with few left para-aortic and left iliac chain lymph nodes largest measuring 8.1 cm left para-aortic node, bilateral subcentimeter noncalcified pulmonary nodules measuring up to 7 mm. AFP, LDH, hCG were all elevated. 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 Completed four cycles of adjuvant therapy with Cisplatin+Etoposide, with cisplatin dose reduced 50% and Etoposide 50% due to thrombocytopenia at that time. Subsequent scans 08/2024 showed resolution of disease and negative markers,         01/26/2025: continues intubated and sedated in the ICU, discussed with ICU team yesterday, pending markers, endocrinology eval noted     01/27/2025:  AFP/BHCG normal,     01/28/2025: continues in MICU intubated, off sedation, Neurology recs noted, L cerebellar lesion not likely be cause of seizure, GOC noted per palliative discussion, pts primary Oncologist aware of current events as well     01/29/2025: , awake and alert, extubated 01/28/2025, continues in MICU         # Brain lesion  # Seizures  - Seizures noted, pt with hx of seizures  - MRI brain now with 5.4 mm enhancing lesion in the LEFT middle cerebellar peduncle with associated edema suspicious for metastases  - MRI Lumbar negative for acute disease  - Small lesion without mass effect or edema, recommended to correlate per neurology if foci and locus are concordant with seizure activity  - Neurology recs noted, new lesion not likely to cause seizure  - It is rare, but nonetheless not impossible, for testicular cancer to be metastatic to the brain  - He will need another PET-CT, can be completed outpatient once stable if concern can proceed with biopsy at that time   - Endocrinology eval for thyroid nodule  - AFP/BHCG normal,      # Thyroid nodule  - US Thyroid 01/24/2025 with 1.6cm complex mixed solid cystic hypoechoic nodule in the lower pole of the right thyroid lobe, TIRADS 5.  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 Ovalles 12/03/2024, markers continued to be negative  - See above in regards to brain lesion  - MRI Neck shows 4.2 cm RIGHT upper lobe mass/infiltrate  - Recommend IR guided biopsy of RUL mass, currently intubated and sedate likely not possible, PET-CT as outpt and then consideration after better characterization   - Follow up as outpatient with Franki Ovalles MD     # Thrombocytopenia  - Labile  - Coags wnl, LFTs elevated  - Continue to trend  - Transfuse to maintain >10k, if actively bleeding then maintain >50k     # Acute kidney injury on CKD Stage IIIA  - Worsening    - Likely multifactorial at this point  - Nephrology consult and recs noted  - Continue to trend     # Leukocytosis, Neutrophilia  - Likely reactive, improved  - Continue to trend     # Normocytic anemia  - Chronic, has hx of PRBC during chemo 07/2024  - Noted Anti E, Anti-K Anti-C antibodies previously  - Monitor for bleeding  - Recommend to transfuse to maintain Hb > 7        Thank you for allowing me to participate in the care Mr. Gr, please do not hesitate to call or text me if you have further questions or concerns.        Brayan Mart MD  [facility]   Division of Hematology/Oncology  [address] [place] [address], [address]  [place] [address]  P: [phone]  F: [phone]    Attestation:    ----Pt evalulated including face-to-face interaction in addition to chart review, reviewing treatment plan, and managing the patient’s chronic diagnoses as listed in the assessment----   Mr. Gr is a 69 year old male with PMHx of seizure disorder, sleep apnea, HTN, chronic idiopathic constipation, stage III CKD, severe obesity, secondary hyperparathyroidism, anemia, thrombocytopenia, hyperlipidemia, testicular cancer s/p EP x 4 under the care of Dr. Ovalles who is admitted s/p fall in setting of possible seizure since 01/16/2025. He was intubated early in his admission due to seziures and extubated 1/21/2025 and then again intubated in setting of respiratory distress, requiring diuresis, with SHAGUFTA on CKD, in the MICU. Imaging shows possible brain metastatic disease and lung mass as well as abnormal thyroid nodule.     Former smoker, quit 14y prior, denied ETOH, drug use. Lives at home. Does not have children. Denied family history of blood disorders or malignancy.  Denied previous workplace related exposures.  Denied previous history of blood transfusions.  Denied history of thromboses.  Denied history of  requiring anticoagulation.        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  Margins were negative.  pT3Nx. CT C/A/P with few left para-aortic and left iliac chain lymph nodes largest measuring 8.1 cm left para-aortic node, bilateral subcentimeter noncalcified pulmonary nodules measuring up to 7 mm. AFP, LDH, hCG were all elevated. 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 Completed four cycles of adjuvant therapy with Cisplatin+Etoposide, with cisplatin dose reduced 50% and Etoposide 50% due to thrombocytopenia at that time. Subsequent scans 08/2024 showed resolution of disease and negative markers,         01/26/2025: continues intubated and sedated in the ICU, discussed with ICU team yesterday, pending markers, endocrinology eval noted     01/27/2025:  AFP/BHCG normal,     01/28/2025: continues in MICU intubated, off sedation, Neurology recs noted, L cerebellar lesion not likely be cause of seizure, GOC noted per palliative discussion, pts primary Oncologist aware of current events as well     01/29/2025: , awake and alert, extubated 01/28/2025, continues in MICU     01/30/2025: now on floor, bipap, no overnight events noted        # Brain lesion  # Seizures  - Seizures noted, pt with hx of seizures  - MRI brain now with 5.4 mm enhancing lesion in the LEFT middle cerebellar peduncle with associated edema suspicious for metastases  - MRI Lumbar negative for acute disease  - Small lesion without mass effect or edema, recommended to correlate per neurology if foci and locus are concordant with seizure activity  - Neurology recs noted, new lesion not likely to cause seizure  - It is rare, but nonetheless not impossible, for testicular cancer to be metastatic to the brain  - He will need another PET-CT, can be completed outpatient once stable if concern can proceed with biopsy at that time   - Endocrinology eval for thyroid nodule  - AFP/BHCG normal,      # Thyroid nodule  - US Thyroid 01/24/2025 with 1.6cm complex mixed solid cystic hypoechoic nodule in the lower pole of the right thyroid lobe, TIRADS 5.  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 Ovalles 12/03/2024, markers continued to be negative  - See above in regards to brain lesion  - MRI Neck shows 4.2 cm RIGHT upper lobe mass/infiltrate  - Recommend IR guided biopsy of RUL mass, currently intubated and sedate likely not possible, PET-CT as outpt and then consideration after better characterization   - Follow up as outpatient with Franki Ovalles MD     # Thrombocytopenia  - Labile  - Coags wnl, LFTs elevated  - Continue to trend  - Transfuse to maintain >10k, if actively bleeding then maintain >50k     # Acute kidney injury on CKD Stage IIIA  - Worsening    - Likely multifactorial at this point  - Nephrology consult and recs noted  - Continue to trend     # Leukocytosis, Neutrophilia  - Likely reactive, improved  - Continue to trend     # Normocytic anemia  - Chronic, has hx of PRBC during chemo 07/2024  - Noted Anti E, Anti-K Anti-C antibodies previously  - Monitor for bleeding  - Recommend to transfuse to maintain Hb > 7        Thank you for allowing me to participate in the care Mr. Gr, please do not hesitate to call or text me if you have further questions or concerns.        Brayan Mart MD  [facility]   Division of Hematology/Oncology  [facility] [facility], [address]  [place] [address]  P: [phone]  F: [phone]    Attestation:    ----Pt evalulated including face-to-face interaction in addition to chart review, reviewing treatment plan, and managing the patient’s chronic diagnoses as listed in the assessment----

## 2025-01-30 NOTE — SWALLOW FEES ASSESSMENT ADULT - ADDITIONAL RECOMMENDATIONS
-Therapeutic trials of small, single sips of moderately thick liquids via straw w/ SLP ONLY!! SLP can consider allowance of straws when Pt demonstrates consistent carry-over of strategy  -Therapeutic trials of solids w/ SLP ONLY!! SLP can subjectively upgrade solid consistency pending improvement in impulsivity w/ PO intake  -Pt will require repeat objective testing for upgrade of liquid consistencies

## 2025-01-30 NOTE — SWALLOW FEES ASSESSMENT ADULT - SUCCESSFUL STRATEGIES TRIALED DURING PROCEDURE
Airway protection improved w/ small, single sips; Cued cough and repeat swallow clears residue from the laryngeal vestibule Cued cough and repeat swallow successfully clears residue from the laryngeal vestibule Liquid wash reduces, however, does not eliminate pharyngeal residue w/ chewable solids

## 2025-01-30 NOTE — SWALLOW FEES ASSESSMENT ADULT - H & P REVIEW
69M w/ hx of metastatic testicular cancer (s/p L orchiectomy, on etoposide/cisplatin chemo, last dose 6/2024), epilepsy (grand-mal seizures), schizophrenia, developmental delay, HTN, HLD, VAZQUEZ, stage III CKD, obesity, secondary hyperparathyroidism, anemia, thrombocytopenia, ? spinal surgery who was admitted on 1/16/25 for fall and syncope. On 1/17 patient had multiple RRTs called for grand mal seizure activity and  patient had x2 more episode and was ultimately given ativan 2mg, Vimpat load 200mg, and intubated for airway protection with MICU transfer. Extubated 1/19 - to AVAPS, post extubation with increased secretion, now better; downgraded 1/21; While on medical floors; patient developed respiratory distress; re-intubated for suspected aspiration pna vs flash pulmonary edema with new fever and leukocytosis. Re-extubated 1/28./yes
yes

## 2025-01-30 NOTE — SWALLOW FEES ASSESSMENT ADULT - COMMENTS
+NGT  +B/l vocal cords mobile  +B/l arytenoid and interarytenoid edema/erythema, vocal cord edema/erythema, and posterior vocal cord granuloma tissue which may be 2/2 recent intubation Neuro following: "vEEG with moderate generalized slowing and no evidence for focal slowing, epileptiform discharges, or seizures; MRI brain w and wo, reviewed: Mild periventricular and moderate deep and subcortical white matter ischemia. 5.4 mm enhancing lesion in the LEFT middle cerebellar peduncle with associated edema suspicious for metastases. "  Heme/onc following for h/o ca: "MRI brain now with 5.4 mm enhancing lesion in the LEFT middle cerebellar peduncle with associated edema suspicious for metastases; MRI Lumbar negative for acute disease; Small lesion without mass effect or edema, recommended to correlate per neurology if foci and locus are concordant with seizure activity; Neurology recs noted, new lesion not likely to cause seizure; MRI Neck shows 4.2 cm RIGHT upper lobe mass/infiltrate; Recommend IR guided biopsy of RUL mass"  Ortho consulted for ?spinal hardware failure: "CT scan reviewed. 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 Given construct type likely is former. No acute pathology."  Cardiology consulted for Hypoxia c/f Pulmonary Edema, Hypertensive Urgency  Nephrology consulted for Abn creatinine.

## 2025-01-30 NOTE — PROGRESS NOTE ADULT - ASSESSMENT
70 y/o M with PMH of metastatic testicular cancer (s/p L orchiectomy, on etoposide/cisplatin chemo, last dose 6/2024), epilepsy (grand-mal seizures), schizophrenia, developmental delay, HTN, HLD, VAZQUEZ, stage III CKD, severe obesity, secondary hyperparathyroidism, anemia, thrombocytopenia who presented to ED for fall, possible seizure prior. On 1/17 patient had multiple RRTs called for grand mal seizure activity and  patient had x2 more episode and was ultimately given ativan 2mg, Vimpat load 200mg, and intubated for airway protection with MICU transfer. Extubated 1/19 - to AVAPS, post extubation with increased secretion. Downgraded to medical floor 1/21. Pt still with fevers, s/p RRT 1/23 AM for fever, hypoxia.  AVAPS - O2 sats 98% but tachypneic to 40, tachycardic, pt endorsing SOB. RRT ---> intubated for respiratory failure. also febrile and Hypertensive. CKD      1- CKD III  with SHAGUFTA   2- CHF   3- fevers  4- tachycardia  5- respiratory failure  6- HTN     cr worsening  suspect ischemic ATN in setting of infection and hypotension   fluid status improved based on pocus by the primary team   off diuretics now   cr still quite elevated but improving after ivf   bun is slightly better   not uremic will hold off on dialysis initiation   zosyn 3,375 g iv  bid  course completed  hyperphosphatemia renvela 1 tab tid   d.w pt sister at bedside in detail

## 2025-01-30 NOTE — SWALLOW FEES ASSESSMENT ADULT - LARYNGEAL PENETRATION BEFORE THE SWALLOW - SILENT
Trace residue remains within the laryngeal vestibule post-swallow; Cued cough and repeat swallow successfully clears residue from the laryngeal vestibule/Mild

## 2025-01-30 NOTE — PROGRESS NOTE ADULT - SUBJECTIVE AND OBJECTIVE BOX
SUBJECTIVE / OVERNIGHT EVENTS:      Patient seen and examined at bedside. NGT in place. Family at bedside      --------------------------------------------------------------------------------------------  LABS:                        9.6    7.16  )-----------( 237      ( 30 Jan 2025 07:15 )             31.2     01-30    147[H]  |  109[H]  |  117[H]  ----------------------------<  135[H]  3.9   |  22  |  4.25[H]    Ca    9.6      30 Jan 2025 07:12  Phos  5.2     01-30  Mg     3.6     01-30    TPro  6.8  /  Alb  3.4  /  TBili  0.3  /  DBili  x   /  AST  50[H]  /  ALT  55[H]  /  AlkPhos  137[H]  01-30    PT/INR - ( 30 Jan 2025 07:17 )   PT: 10.0 sec;   INR: 0.87 ratio         PTT - ( 30 Jan 2025 07:17 )  PTT:26.7 sec  CAPILLARY BLOOD GLUCOSE      POCT Blood Glucose.: 118 mg/dL (30 Jan 2025 05:45)  POCT Blood Glucose.: 133 mg/dL (29 Jan 2025 23:18)  POCT Blood Glucose.: 137 mg/dL (29 Jan 2025 18:20)        Urinalysis Basic - ( 30 Jan 2025 07:12 )    Color: x / Appearance: x / SG: x / pH: x  Gluc: 135 mg/dL / Ketone: x  / Bili: x / Urobili: x   Blood: x / Protein: x / Nitrite: x   Leuk Esterase: x / RBC: x / WBC x   Sq Epi: x / Non Sq Epi: x / Bacteria: x        RADIOLOGY & ADDITIONAL TESTS:    Imaging Personally Reviewed:  [x] YES  [ ] NO    Consultant(s) Notes Reviewed:  [x] YES  [ ] NO    MEDICATIONS  (STANDING):  albuterol/ipratropium for Nebulization 3 milliLiter(s) Nebulizer every 6 hours  atorvastatin 20 milliGRAM(s) Oral at bedtime  bacitracin   Ointment 1 Application(s) Topical two times a day  chlorhexidine 2% Cloths 1 Application(s) Topical <User Schedule>  chlorhexidine 2% Cloths 1 Application(s) Topical <User Schedule>  escitalopram 15 milliGRAM(s) Oral daily  gabapentin Solution 150 milliGRAM(s) Oral two times a day  heparin   Injectable 7500 Unit(s) SubCutaneous every 8 hours  influenza  Vaccine (HIGH DOSE) 0.5 milliLiter(s) IntraMuscular once  lacosamide IVPB 100 milliGRAM(s) IV Intermittent every 12 hours  lamoTRIgine 100 milliGRAM(s) Oral two times a day  levETIRAcetam  IVPB 750 milliGRAM(s) IV Intermittent two times a day  lidocaine   4% Patch 1 Patch Transdermal daily  lurasidone 40 milliGRAM(s) Oral daily  pantoprazole  Injectable 40 milliGRAM(s) IV Push daily  polyethylene glycol 3350 17 Gram(s) Oral daily  senna Syrup 10 milliLiter(s) Oral at bedtime  sevelamer carbonate Powder 800 milliGRAM(s) Oral three times a day with meals    MEDICATIONS  (PRN):  nystatin Powder 1 Application(s) Topical three times a day PRN fungal infection you may see and want to treat      Care Discussed with Consultants/Other Providers [x] YES  [ ] NO    Vital Signs Last 24 Hrs  T(C): 37 (30 Jan 2025 09:02), Max: 37 (30 Jan 2025 09:02)  T(F): 98.6 (30 Jan 2025 09:02), Max: 98.6 (30 Jan 2025 09:02)  HR: 86 (30 Jan 2025 09:02) (77 - 91)  BP: 136/79 (30 Jan 2025 09:02) (97/65 - 137/71)  BP(mean): 88 (29 Jan 2025 13:00) (83 - 96)  RR: 18 (30 Jan 2025 09:02) (18 - 28)  SpO2: 96% (30 Jan 2025 09:02) (85% - 96%)    Parameters below as of 30 Jan 2025 09:02  Patient On (Oxygen Delivery Method): nasal cannula  O2 Flow (L/min): 6    I&O's Summary    29 Jan 2025 07:01  -  30 Jan 2025 07:00  --------------------------------------------------------  IN: 780 mL / OUT: 1075 mL / NET: -295 mL      PHYSICAL EXAM:  GENERAL: NAD,   HEAD:  Atraumatic, Normocephalic, +NGT  EYES: EOMI, PERRLA, conjunctiva and sclera clear  NECK: Supple, No JVD  CHEST/LUNG: Diminished bilaterally; No wheeze  HEART: Regular rate and rhythm; No murmurs, rubs, or gallops  ABDOMEN: Soft, Nontender, Nondistended; Bowel sounds present  NEURO: AAOx3, no focal weakness, 5/5 b/l extremity strength, b/l knee no arthritis, no effusion   EXTREMITIES:  2+ Peripheral Pulses, No clubbing, cyanosis, or edema  SKIN: No rashes or lesions

## 2025-01-30 NOTE — SWALLOW FEES ASSESSMENT ADULT - RESIDUE IN VALLECULAE
Trace/Mild Mild-moderate residue w/ chewable solids--improved to trace-mild w/ a liquid wash/Trace/Mild

## 2025-01-30 NOTE — PROGRESS NOTE ADULT - PROBLEM SELECTOR PLAN 1
-S/p intubation in MICU in the setting of grand mal seizures, pneumonia, Flu  -Completed course of ABX, tamiflu for Flu. Extubated to AVAPS 1/19  -S/p RRT 1/23 AM for fevers, hypoxia requiring AVAPS. Likely aspiration event +/- flash pulmonary edema  -S/p 2nd RRT 1/23 PM for increased WOB on AVAPS, intubated and tx to MICU   -Extubated to AVAPS 1/28, now using qHS   -Keep O>I as tolerated  -Continue ABX, to complete additional 7 day course   -Continue bronchodilators  -Continue AVAPS  ePAP 5 Rate 14 Max pressure 35 Min Pressure 15 fio2 40% qHS and PRN for increased WOB for now. Pt has CPAP at home for VAZQUEZ, will need to monitor off AVAPs prior to discharge.  -CXR 1/29 with low lung volumes  -ABG in AM. -S/p intubation in MICU in the setting of grand mal seizures, pneumonia, Flu  -Completed course of ABX, tamiflu for Flu. Extubated to AVAPS 1/19  -S/p RRT 1/23 AM for fevers, hypoxia requiring AVAPS. Likely aspiration event +/- flash pulmonary edema  -S/p 2nd RRT 1/23 PM for increased WOB on AVAPS, intubated and tx to MICU   -Extubated to AVAPS 1/28, now using qHS   -Keep O>I as tolerated  -Continue ABX, to complete additional 7 day course   -Continue bronchodilators  -Continue AVAPS  ePAP 5 Rate 14 Max pressure 35 Min Pressure 15 fio2 40% qHS and PRN for increased WOB for now. Pt has CPAP at home for VAZQUEZ, will need to monitor off AVAPs prior to discharge.  -Keep sats >90% with O2 PRN. Wean o2 as tolerated.   -CXR 1/29 with low lung volumes  -ABG in AM  -Suggest repeat CT chest.

## 2025-01-30 NOTE — PROGRESS NOTE ADULT - SUBJECTIVE AND OBJECTIVE BOX
DATE OF SERVICE: 01-30-25 @ 23:12    Patient is a 69y old  Male who presents with a chief complaint of seizure, flu, elevated trop (30 Jan 2025 20:24)      INTERVAL HISTORY:  feels ok     PHYSICAL EXAM:  T(C): 37.1 (01-30-25 @ 15:33), Max: 37.1 (01-30-25 @ 15:33)  HR: 85 (01-30-25 @ 22:41) (78 - 86)  BP: 121/78 (01-30-25 @ 15:33) (115/73 - 136/79)  RR: 18 (01-30-25 @ 15:33) (18 - 20)  SpO2: 97% (01-30-25 @ 22:41) (95% - 100%)  Wt(kg): --  I&O's Summary    29 Jan 2025 07:01  -  30 Jan 2025 07:00  --------------------------------------------------------  IN: 780 mL / OUT: 1075 mL / NET: -295 mL    30 Jan 2025 07:01  -  30 Jan 2025 23:12  --------------------------------------------------------  IN: 0 mL / OUT: 1000 mL / NET: -1000 mL          Appearance: In no distress	  HEENT:    PERRL, EOMI	  Cardiovascular:  S1 S2, No JVD  Respiratory: Lungs clear to auscultation	  Gastrointestinal:  Soft, Non-tender, + BS	  Vascularature:  No edema of LE  Psychiatric: Appropriate affect   Neuro: no acute focal deficits                               9.6    7.16  )-----------( 237      ( 30 Jan 2025 07:15 )             31.2     01-30    147[H]  |  109[H]  |  117[H]  ----------------------------<  135[H]  3.9   |  22  |  4.25[H]    Ca    9.6      30 Jan 2025 07:12  Phos  5.2     01-30  Mg     3.6     01-30    TPro  6.8  /  Alb  3.4  /  TBili  0.3  /  DBili  x   /  AST  50[H]  /  ALT  55[H]  /  AlkPhos  137[H]  01-30        Labs personally reviewed      ASSESSMENT/PLAN: 	    69M w/ hx of metastatic testicular cancer (s/p L orchiectomy, on etoposide/cisplatin chemo, last dose 6/2024), epilepsy (grand-mal seizures), schizophrenia, developmental delay, HTN, HLD, VAZQUEZ, stage III CKD, obesity, secondary hyperparathyroidism, anemia, thrombocytopenia, ? spinal surgery who was admitted on 1/16/25 for fall and syncope. On 1/17 patient had multiple RRTs called for grand mal seizure activity and  patient had x2 more episode and was ultimately given ativan 2mg, Vimpat load 200mg, and intubated for airway protection with MICU transfer. Extubated 1/19 - to AVAPS, post extubation with increased secretion, now better; downgraded 1/21; While on medical floors; patient developed respiratory distress; now intubated for suspected aspiration pna vs flash pulmonary edema. Extubated 1/28/25.    Problem/Plan #1: Acute Hypoxic Respiratory Failure  - Intubated for airway protection i/s/o multiple RRTs for grand mal seizure activity, Extubated 1/19 to AVAPS  - Reintubated 1/23 after hypoxic episode for suspected aspiration pna vs flash pulmonary edema.   - BNP 3949  - CXR shows mild pulm edema on 1/16; CXR 1/25  Bibasilar atelectasis. Mild interstitial edema.. Improved aeration in the right upper lobe  - TTE with preserved EF, no WMA, dilated aortic root  - Was on Bumex IV - stopped on 1/25 due to ^ creatinine 4.31 from 3.92  - CT chest 1/30 pending  - Check proBNP    Problem/Plan #2: Hypertensive Urgency  - BP now stable off anti-hypertensives. Will slowly re-implement as BP recovers.    Problem/Plan #3: HLD  - c/w atorvastatin 20mg daily    Problem/Plan #4: DVT Ppx  - c/w SQ heparin        Gus Andrew DO [facility]  Cardiovascular Medicine  [address], [address]  Office: [phone]  Available via Text/call on Microsoft Teams

## 2025-01-30 NOTE — SWALLOW FEES ASSESSMENT ADULT - SPECIFY REASON(S)
to assess the swallow mechanism; r/o dysphagia. cough
to objectively assess the swallow mechanism and r/o aspiration

## 2025-01-30 NOTE — SWALLOW FEES ASSESSMENT ADULT - ORAL PHASE COMMENTS
Prolonged mastication and piecemeal swallows for chewable solids; family reports hx of impulsive intake and incomplete mastication Pt noted w/ impulsive intake and large sips via cup and straw

## 2025-01-30 NOTE — SWALLOW FEES ASSESSMENT ADULT - SLP GENERAL OBSERVATIONS
Encountered Pt sitting semi-reclined in bed on 6L O2 via NC. +NGT. Family member at bedside throughout this encounter. Pt is alert, verbally responsive, and able to follow commands for the purpose of this exam.

## 2025-01-30 NOTE — PROGRESS NOTE ADULT - SUBJECTIVE AND OBJECTIVE BOX
[facility]COL[facility]Y INPATIENT PROGRESS NOTE     Interval Hx:   01-30-25: Mr. Gr was seen at bedside today.    Meds:   MEDICATIONS  (STANDING):  albuterol/ipratropium for Nebulization 3 milliLiter(s) Nebulizer every 6 hours  atorvastatin 20 milliGRAM(s) Oral at bedtime  bacitracin   Ointment 1 Application(s) Topical two times a day  chlorhexidine 2% Cloths 1 Application(s) Topical <User Schedule>  chlorhexidine 2% Cloths 1 Application(s) Topical <User Schedule>  escitalopram 15 milliGRAM(s) Oral daily  gabapentin Solution 150 milliGRAM(s) Oral two times a day  heparin   Injectable 7500 Unit(s) SubCutaneous every 8 hours  influenza  Vaccine (HIGH DOSE) 0.5 milliLiter(s) IntraMuscular once  lacosamide IVPB 100 milliGRAM(s) IV Intermittent every 12 hours  lamoTRIgine 100 milliGRAM(s) Oral two times a day  levETIRAcetam  IVPB 750 milliGRAM(s) IV Intermittent two times a day  lidocaine   4% Patch 1 Patch Transdermal daily  lurasidone 40 milliGRAM(s) Oral daily  pantoprazole  Injectable 40 milliGRAM(s) IV Push daily  piperacillin/tazobactam IVPB.. 3.375 Gram(s) IV Intermittent every 12 hours  polyethylene glycol 3350 17 Gram(s) Oral daily  senna Syrup 10 milliLiter(s) Oral at bedtime  sevelamer carbonate Powder 800 milliGRAM(s) Oral three times a day with meals    MEDICATIONS  (PRN):  nystatin Powder 1 Application(s) Topical three times a day PRN fungal infection you may see and want to treat    Vital Signs Last 24 Hrs  T(C): 36.8 (30 Jan 2025 00:22), Max: 36.8 (30 Jan 2025 00:22)  T(F): 98.2 (30 Jan 2025 00:22), Max: 98.2 (30 Jan 2025 00:22)  HR: 78 (30 Jan 2025 00:22) (72 - 91)  BP: 115/73 (30 Jan 2025 00:22) (97/65 - 137/71)  BP(mean): 88 (29 Jan 2025 13:00) (76 - 96)  RR: 20 (30 Jan 2025 00:22) (18 - 28)  SpO2: 95% (30 Jan 2025 00:22) (85% - 100%)    Parameters below as of 30 Jan 2025 00:22  Patient On (Oxygen Delivery Method): BiPAP/CPAP    Physical Exam:  Gen: NAD  HEENT: EOMI, MMM  Chest: equal chest rise  Cardiac: +S1 S2  Abd: non distended   Neuro: AAOx2    Labs:                        9.9    7.01  )-----------( 215      ( 29 Jan 2025 00:40 )             31.9     CBC Full  -  ( 29 Jan 2025 00:40 )  WBC Count : 7.01 K/uL  RBC Count : 3.18 M/uL  Hemoglobin : 9.9 g/dL  Hematocrit : 31.9 %  Platelet Count - Automated : 215 K/uL  Mean Cell Volume : 100.3 fl  Mean Cell Hemoglobin : 31.1 pg  Mean Cell Hemoglobin Concentration : 31.0 g/dL  Auto Neutrophil # : 5.00 K/uL  Auto Lymphocyte # : 0.85 K/uL  Auto Monocyte # : 0.63 K/uL  Auto Eosinophil # : 0.27 K/uL  Auto Basophil # : 0.03 K/uL  Auto Neutrophil % : 71.3 %  Auto Lymphocyte % : 12.1 %  Auto Monocyte % : 9.0 %  Auto Eosinophil % : 3.9 %  Auto Basophil % : 0.4 %    01-29    146[H]  |  106  |  117[H]  ----------------------------<  135[H]  3.3[L]   |  21[L]  |  4.71[H]    Ca    9.5      29 Jan 2025 11:56  Phos  8.1     01-29  Mg     3.6     01-29    TPro  7.1  /  Alb  3.4  /  TBili  0.3  /  DBili  x   /  AST  62[H]  /  ALT  62[H]  /  AlkPhos  139[H]  01-29    PT/INR - ( 29 Jan 2025 00:40 )   PT: 11.1 sec;   INR: 0.97 ratio         PTT - ( 29 Jan 2025 00:40 )  PTT:39.3 sec  Bilirubin Total: 0.3 mg/dL (01-29-25 @ 11:56)   [facility]AT[facility]/ONCOL[facility]Y INPATIENT PROGRESS NOTE     Interval Hx:   01-30-25: Mr. Gr was seen at bedside today, now on floor, bipap, no overnight events noted     Meds:   MEDICATIONS  (STANDING):  albuterol/ipratropium for Nebulization 3 milliLiter(s) Nebulizer every 6 hours  atorvastatin 20 milliGRAM(s) Oral at bedtime  bacitracin   Ointment 1 Application(s) Topical two times a day  chlorhexidine 2% Cloths 1 Application(s) Topical <User Schedule>  chlorhexidine 2% Cloths 1 Application(s) Topical <User Schedule>  escitalopram 15 milliGRAM(s) Oral daily  gabapentin Solution 150 milliGRAM(s) Oral two times a day  heparin   Injectable 7500 Unit(s) SubCutaneous every 8 hours  influenza  Vaccine (HIGH DOSE) 0.5 milliLiter(s) IntraMuscular once  lacosamide IVPB 100 milliGRAM(s) IV Intermittent every 12 hours  lamoTRIgine 100 milliGRAM(s) Oral two times a day  levETIRAcetam  IVPB 750 milliGRAM(s) IV Intermittent two times a day  lidocaine   4% Patch 1 Patch Transdermal daily  lurasidone 40 milliGRAM(s) Oral daily  pantoprazole  Injectable 40 milliGRAM(s) IV Push daily  piperacillin/tazobactam IVPB.. 3.375 Gram(s) IV Intermittent every 12 hours  polyethylene glycol 3350 17 Gram(s) Oral daily  senna Syrup 10 milliLiter(s) Oral at bedtime  sevelamer carbonate Powder 800 milliGRAM(s) Oral three times a day with meals    MEDICATIONS  (PRN):  nystatin Powder 1 Application(s) Topical three times a day PRN fungal infection you may see and want to treat    Vital Signs Last 24 Hrs  T(C): 36.8 (30 Jan 2025 00:22), Max: 36.8 (30 Jan 2025 00:22)  T(F): 98.2 (30 Jan 2025 00:22), Max: 98.2 (30 Jan 2025 00:22)  HR: 78 (30 Jan 2025 00:22) (72 - 91)  BP: 115/73 (30 Jan 2025 00:22) (97/65 - 137/71)  BP(mean): 88 (29 Jan 2025 13:00) (76 - 96)  RR: 20 (30 Jan 2025 00:22) (18 - 28)  SpO2: 95% (30 Jan 2025 00:22) (85% - 100%)    Parameters below as of 30 Jan 2025 00:22  Patient On (Oxygen Delivery Method): BiPAP/CPAP    Physical Exam:  Gen: NAD  HEENT: EOMI, MMM  Chest: equal chest rise  Cardiac: regular   Abd: non distended   Neuro: AAOx2    Labs:                        9.9    7.01  )-----------( 215      ( 29 Jan 2025 00:40 )             31.9     CBC Full  -  ( 29 Jan 2025 00:40 )  WBC Count : 7.01 K/uL  RBC Count : 3.18 M/uL  Hemoglobin : 9.9 g/dL  Hematocrit : 31.9 %  Platelet Count - Automated : 215 K/uL  Mean Cell Volume : 100.3 fl  Mean Cell Hemoglobin : 31.1 pg  Mean Cell Hemoglobin Concentration : 31.0 g/dL  Auto Neutrophil # : 5.00 K/uL  Auto Lymphocyte # : 0.85 K/uL  Auto Monocyte # : 0.63 K/uL  Auto Eosinophil # : 0.27 K/uL  Auto Basophil # : 0.03 K/uL  Auto Neutrophil % : 71.3 %  Auto Lymphocyte % : 12.1 %  Auto Monocyte % : 9.0 %  Auto Eosinophil % : 3.9 %  Auto Basophil % : 0.4 %    01-29    146[H]  |  106  |  117[H]  ----------------------------<  135[H]  3.3[L]   |  21[L]  |  4.71[H]    Ca    9.5      29 Jan 2025 11:56  Phos  8.1     01-29  Mg     3.6     01-29    TPro  7.1  /  Alb  3.4  /  TBili  0.3  /  DBili  x   /  AST  62[H]  /  ALT  62[H]  /  AlkPhos  139[H]  01-29    PT/INR - ( 29 Jan 2025 00:40 )   PT: 11.1 sec;   INR: 0.97 ratio         PTT - ( 29 Jan 2025 00:40 )  PTT:39.3 sec  Bilirubin Total: 0.3 mg/dL (01-29-25 @ 11:56)

## 2025-01-30 NOTE — SWALLOW FEES ASSESSMENT ADULT - ROSENBEK'S PENETRATION ASPIRATION SCALE
(3) material remains above the vocal cords, visible residue remains (penetration) Cup PAS=3                                                        Straw single sip PAS=1                                     Straw consecutive sip PAS=3 (1) no aspiration, material does not enter airway

## 2025-01-30 NOTE — PROGRESS NOTE ADULT - ASSESSMENT
69M w/ hx of metastatic testicular cancer (s/p L orchiectomy, on etoposide/cisplatin chemo), epilepsy (grand-mal seizures), schizophrenia, developmental delay, HTN, HLD, VAZQUEZ, stage III CKD, severe obesity, secondary hyperparathyroidism, anemia, thrombocytopenia who presented to ED for fall, possible seizure prior.    Plan:    # Hypoxia: Sepsis/sirs, AHRF possible flash pulmonary edema iso uncontrolled HTN, c/f aspiration pneumonia   - S/p RRT 1/23 am and 1/23 pm, for inc WOB on AVAPS, s/p intubation and transfer to MICU, required pressor support-> downgraded 1/29  - Sepsis protocol initiated  - CXR w/ mild pulm congestion.  - Diuresis as tolerated  - Thyroid US w/ nodule  - NGT in place, c/w TF's as tolerated-> pending Endoscopic eval swallow test  - 1/25 CXR with R infiltrate, Bcx remain NGTD, WBC down  - 1/25 Scx with rare Pseudomonas aeruginosa -pansensitive   - MRI brain with metastatic disease  - 1/28 s/p extubation   - Discontinued zosyn --completed 7d course 1/29  - Monitor off antibiotics per ID rec's  - ID and Pulm following    # Syncopal seizure:  -  Patient with hx of epilepsy, generalized grand mal seizures  - C/w Lamictal and Keppra  - CTH/C spine/MF negative for acute ICH, notable for mild R hematoma  - 1/17 sp multiple RRTs  for grand mal seizure activity and was initially recommended sepsis workup and antipyretics given T103F, but patient had x2 more episode and was ultimately given ativan 2mg, vimpat load 200mg, and intubated for airway protection with MICU transfer.   - Extubated 1/20  - Keppra 1.5g BID, Lamotrigine 100mg BID, Gabapentin 300mg BID, Vimpat 150mg BID  - Ativan for seizures PRM  - Per Neuro -> MRI brain w and wo contrast to look for potential seizure foci that could cause increased frequency especially given hx of metastatic testicular cancer; deferred MRI given copious secretions   - s/p vEEG, negative for seizures  - Care per MICU-> downgraded 1/21, back to ICU 1/23 for Hypoxia, Increased WOB, Downgraded 1/29    # Brain lesion:  - MRI brain now with 5.4 mm enhancing lesion in the LEFT middle cerebellar peduncle with associated edema suspicious for metastases  - MRI Lumbar negative for acute disease  - Neurology recs noted, new lesion not likely to cause seizure  - Seen by Heme Onc-> will need another PET-CT, can be completed outpatient once stable if concern can proceed with biopsy at that time   - Heme Onc following    # Flu:  - Continue tamiflu, completed 5d on 1/22   - Monitor temps/WBC  - ID following    # CKD3: Cr 4.25 today  - Monitor I/O's  - Serial Cr  - Avoid nephrotoxins  - Renally dose medications  - Renal following->  if renal function does not begin to improve will need dialysis     # HTN/ HLD:  - C/w CV meds    # Pneumonia:  - C/w IV abx-> Completed ceftriaxone 5d course on 1/21  - Retreated for aspiration pneumonia in ICU on 1/23  - ID following    # Schizophrenia/Depression:  - C/w current meds    # VAZQUEZ:  - CPAP at night    # Hx of metastatic testicular cancer, s/p L orchiectomy, on etoposide/cisplatin chemo, last dose on 6/21/24:  - Outpt Oncology follow-up    # DVT ppx:  - Heparin sq    [facility]  [phone]  MD. RAFY Knight

## 2025-01-30 NOTE — PROGRESS NOTE ADULT - SUBJECTIVE AND OBJECTIVE BOX
[facility]   [phone]  RENAL FOLLOW UP NOTE  --------------------------------------------------------------------------------  Chief Complaint:    24 hour events/subjective:    seen earlier   pt sister at bedside   denies sob     PAST HISTORY  --------------------------------------------------------------------------------  No significant changes to PMH, PSH, FHx, SHx, unless otherwise noted    ALLERGIES & MEDICATIONS  --------------------------------------------------------------------------------  Allergies    seasonal allergies (Unknown)  Drug Allergies Not Recorded    Intolerances    latex (Rash)    Standing Inpatient Medications  albuterol/ipratropium for Nebulization 3 milliLiter(s) Nebulizer every 6 hours  atorvastatin 20 milliGRAM(s) Oral at bedtime  bacitracin   Ointment 1 Application(s) Topical two times a day  chlorhexidine 2% Cloths 1 Application(s) Topical <User Schedule>  chlorhexidine 2% Cloths 1 Application(s) Topical <User Schedule>  escitalopram 15 milliGRAM(s) Oral daily  gabapentin Solution 150 milliGRAM(s) Oral two times a day  heparin   Injectable 7500 Unit(s) SubCutaneous every 8 hours  influenza  Vaccine (HIGH DOSE) 0.5 milliLiter(s) IntraMuscular once  lacosamide IVPB 100 milliGRAM(s) IV Intermittent every 12 hours  lamoTRIgine 100 milliGRAM(s) Oral two times a day  levETIRAcetam  IVPB 750 milliGRAM(s) IV Intermittent two times a day  lidocaine   4% Patch 1 Patch Transdermal daily  lurasidone 40 milliGRAM(s) Oral daily  pantoprazole  Injectable 40 milliGRAM(s) IV Push daily  polyethylene glycol 3350 17 Gram(s) Oral daily  senna Syrup 10 milliLiter(s) Oral at bedtime  sevelamer carbonate Powder 800 milliGRAM(s) Oral three times a day with meals    PRN Inpatient Medications  nystatin Powder 1 Application(s) Topical three times a day PRN      REVIEW OF SYSTEMS  --------------------------------------------------------------------------------    Gen: denies  fevers/chills, or HA or dizziness   CVS: denies chest pain/palpitations  Resp: denies SOB/Cough  GI: Denies N/V/Abd pain  : Denies dysuria    VITALS/PHYSICAL EXAM  --------------------------------------------------------------------------------  T(C): 37.1 (01-30-25 @ 15:33), Max: 37.1 (01-30-25 @ 15:33)  HR: 81 (01-30-25 @ 16:05) (78 - 86)  BP: 121/78 (01-30-25 @ 15:33) (115/73 - 136/79)  RR: 18 (01-30-25 @ 15:33) (18 - 20)  SpO2: 98% (01-30-25 @ 16:05) (93% - 100%)  Wt(kg): --  Height (cm): 167.6 (01-29-25 @ 00:00)  Weight (kg): 95.5 (01-29-25 @ 00:00)  BMI (kg/m2): 34 (01-29-25 @ 00:00)  BSA (m2): 2.04 (01-29-25 @ 00:00)      01-29-25 @ 07:01  -  01-30-25 @ 07:00  --------------------------------------------------------  IN: 780 mL / OUT: 1075 mL / NET: -295 mL      Physical Exam:  	    Gen: comfortable appearing + NGT   	no jvd  	Pulm: decrease bs  no rales or ronchi or wheezing  	CV: tachy  S1S2; no rub  	Abd: hypoactive bs soft distended  	UE: Warm, no cyanosis  no clubbing,  no edema;   	LE: Warm, no cyanosis  no clubbing, no edema  	Neuro:  alert and oriented       LABS/STUDIES  --------------------------------------------------------------------------------              9.6    7.16  >-----------<  237      [01-30-25 @ 07:15]              31.2     147  |  109  |  117  ----------------------------<  135      [01-30-25 @ 07:12]  3.9   |  22  |  4.25        Ca     9.6     [01-30-25 @ 07:12]      Mg     3.6     [01-30-25 @ 07:12]      Phos  5.2     [01-30-25 @ 07:12]    TPro  6.8  /  Alb  3.4  /  TBili  0.3  /  DBili  x   /  AST  50  /  ALT  55  /  AlkPhos  137  [01-30-25 @ 07:12]    PT/INR: PT 10.0 , INR 0.87       [01-30-25 @ 07:17]  PTT: 26.7       [01-30-25 @ 07:17]      Creatinine Trend:  SCr 4.25 [01-30 @ 07:12]  SCr 4.71 [01-29 @ 11:56]  SCr 5.08 [01-29 @ 00:39]  SCr 4.86 [01-28 @ 15:25]  SCr 4.80 [01-28 @ 00:17]

## 2025-01-30 NOTE — SWALLOW FEES ASSESSMENT ADULT - LARYNGEAL PENETRATION DURING SWALLOW - SILENT
Deep laryngeal penetration above the level of the vocal cords w/ cup sips and consecutive straw sips; Cued cough and repeat swallow successfully clears residue from the laryngeal vestibule/Trace

## 2025-01-30 NOTE — SWALLOW FEES ASSESSMENT ADULT - SLP PERTINENT HISTORY OF CURRENT PROBLEM
69M w/ hx of metastatic testicular cancer (s/p L orchiectomy, on etoposide/cisplatin chemo, last dose 6/2024), epilepsy (grand-mal seizures), schizophrenia, developmental delay, HTN, HLD, VAZQUEZ, stage III CKD, obesity, secondary hyperparathyroidism, anemia, thrombocytopenia who was admitted on 1/16/25 for fall and syncope. On 1/17 patient had multiple RRTs called for grand mal seizure activity and was initially recommended sepsis workup and antipyretics given T103F, but patient had x2 more episode and was ultimately given ativan 2mg, vimpat load 200mg, and intubated for airway protection with MICU transfer.
Pt known to this service from earlier this admission, seen 1/21/25 for BSE and FEES with recommendations for soft bite-size and mildly thick liquids given penetration without retrieval of thin liquid trials. Repeat bedside swallow evaluation 1/29 w/ recommendations for NPO/NGT and FEES for more comprehensive assessment of swallow function given hx of dysphagia and c/f possible aspiration PNA.

## 2025-01-30 NOTE — PROGRESS NOTE ADULT - ASSESSMENT
Patient is a 69 year old male with PMH of metastatic testicular cancer (s/p L orchiectomy, on etoposide/cisplatin chemo, last dose 6/2024), epilepsy (grand-mal seizures), schizophrenia, developmental delay, HTN, HLD, VAZQUEZ, stage III CKD, severe obesity, secondary hyperparathyroidism, anemia, thrombocytopenia who presented to ED for fall, possible seizure prior. Patient reported cough for few weeks with no other respiratory symptoms.   Admitted for further work up for syncope, c/f seizure   Influenza A  1/17 s/p RRTs for grand mal seizure activity, s/p intubation and transfer to MICU  - 1/17 CT chest with anterior bowing of posterior tracheal wall suggestive of tracheomalacia, trace R pleural effusion   - MRSA PCR screen negative    - 1/18 sputum culture negative    - s/p extubation 1/19   - 1/20 Bcx negative    - s/p ceftriaxone 1/17-1/21   - s/p tamiflu 1/17-1/21    Sepsis/sirs, AHRF possible flash pulmonary edema iso uncontrolled HTN, c/f aspiration pneumonia   - 1/23 s/p RRT am for hypoxia/inc WOB, febrile last night 100.6F and now 101F this am, tachycardia, leukocytosis 11k   - repeat COVID/Flu/RSV with known influenza A-likely shedding    - reported occ dysuria, UA negative for pyuria   - 1/23 afternoon s/p RRT for inc WOB on AVAPS, s/p intubation and transfer to MICU, required pressor support  - MRSA PCR screen negative   - 1/24 afebrile overnight, WBC uptrending, weaned off pressor earlier in am, on zosyn   - 1/25 CXR with R infiltrate, Bcx remain NGTD, WBC down  - 1/25 Scx with rare Pseudomonas aeruginosa -pansensitive   - MRI brain with metastatic disease  - thyroid us with nodule  - 1/26 CT spine with no acute abn of spine, stable postop changes and hardware; noted with multifocal lung abn with bibasilar atelectasis or consolidation and patchy airspace opacities in the RUL  - 1/28 s/p extubation   - reportedly h/o penicillin allergy--tolerating zosyn, removed from allergy list   - remains afebrile, WBC wnl, on NC, nontoxic appearing     s/p cefepime 1/23  s/p zosyn 1/23    Recommendations:   Discontinued zosyn --completed 7d course 1/29  Monitor off antibiotics   Monitor temps/WBC  Aspiration precautions   Continue rest of care per primary team       Greyson Mart M.D.  [facility]  Available on [employer] TEAMS - *PREFERRED*  [phone]  After 5pm on weekdays and all day on weekends - please call [phone]     Thank you for consulting us and involving us in the management of this patients case. In addition to reviewing history, imaging, documents, labs, microbiology, took into account antibiotic stewardship, local antibiogram and infection control strategies and potential transmission issues.

## 2025-01-30 NOTE — SWALLOW FEES ASSESSMENT ADULT - CONSISTENCIES ADMINISTERED
mildly thick ice chips; moderately thick via tsp; mildly thick via tsp/pureed/soft & bite-sized/easy to chew/regular solid moderately thick

## 2025-01-30 NOTE — PROGRESS NOTE ADULT - SUBJECTIVE AND OBJECTIVE BOX
[facility]  JACINTO Horton Y. Patel, S. Shah, G. Casimir  [phone]  ([phone] - weekdays after 5pm and weekends)    Name: OSCAR MILAN  Age/Gender: 69y Male  MRN: [id number]    Interval History:  Patient seen and examined this morning.   Has productive cough, no dyspnea, pain or fevers.  States he feels weak, no other new complaints.   Notes reviewed  No concerning overnight events  Afebrile. Sister at bedside.   Allergies: penicillin (Hives)  seasonal allergies (Unknown)      Objective:  Vitals:   T(F): 98.6 (01-30-25 @ 09:02), Max: 98.6 (01-30-25 @ 09:02)  HR: 86 (01-30-25 @ 09:02) (77 - 91)  BP: 136/79 (01-30-25 @ 09:02) (97/65 - 137/71)  RR: 18 (01-30-25 @ 09:02) (18 - 28)  SpO2: 96% (01-30-25 @ 09:02) (85% - 96%)  Physical Examination:  General: no acute distress, NC  HEENT: NC/AT, anicteric, R lips with dried blood   Respiratory: decreased breath sounds b/l   Cardiovascular: S1 and S2 present, normal rate   Gastrointestinal: nontender, nondistended  Extremities: no edema, no cyanosis  Skin: no visible rash    Laboratory Studies:  CBC:                       9.6    7.16  )-----------( 237      ( 30 Jan 2025 07:15 )             31.2     WBC Trend:  7.16 01-30-25 @ 07:15  7.01 01-29-25 @ 00:40  6.50 01-28-25 @ 00:17  6.98 01-27-25 @ 00:32  9.04 01-26-25 @ 00:16  12.77 01-25-25 @ 01:18  16.33 01-24-25 @ 00:03  13.21 01-23-25 @ 15:10    CMP: 01-30    147[H]  |  109[H]  |  117[H]  ----------------------------<  135[H]  3.9   |  22  |  4.25[H]    Ca    9.6      30 Jan 2025 07:12  Phos  5.2     01-30  Mg     3.6     01-30    TPro  6.8  /  Alb  3.4  /  TBili  0.3  /  DBili  x   /  AST  50[H]  /  ALT  55[H]  /  AlkPhos  137[H]  01-30    Creatinine: 4.25 mg/dL (01-30-25 @ 07:12)  Creatinine: 4.71 mg/dL (01-29-25 @ 11:56)  Creatinine: 5.08 mg/dL (01-29-25 @ 00:39)  Creatinine: 4.86 mg/dL (01-28-25 @ 15:25)  Creatinine: 4.80 mg/dL (01-28-25 @ 00:17)  Creatinine: 4.68 mg/dL (01-27-25 @ 00:32)  Creatinine: 4.66 mg/dL (01-26-25 @ 12:10)  Creatinine: 4.70 mg/dL (01-26-25 @ 00:17)  Creatinine: 4.76 mg/dL (01-25-25 @ 17:46)  Creatinine: 4.70 mg/dL (01-25-25 @ 11:31)  Creatinine: 4.31 mg/dL (01-25-25 @ 01:18)  Creatinine: 3.92 mg/dL (01-24-25 @ 17:15)  Creatinine: 3.61 mg/dL (01-24-25 @ 10:29)  Creatinine: 2.95 mg/dL (01-24-25 @ 00:03)  Creatinine: 2.69 mg/dL (01-23-25 @ 18:16)    LIVER FUNCTIONS - ( 30 Jan 2025 07:12 )  Alb: 3.4 g/dL / Pro: 6.8 g/dL / ALK PHOS: 137 U/L / ALT: 55 U/L / AST: 50 U/L / GGT: x           Microbiology: reviewed   Culture - Sputum (collected 01-25-25 @ 07:21)  Source: .Sputum Sputum  Gram Stain (01-26-25 @ 23:12):    Numerous polymorphonuclear leukocytes per low power field    Rare Squamous epithelial cells per low power field    No organisms seen  Final Report (01-27-25 @ 17:11):    Rare Pseudomonas aeruginosa    Commensal lucia consistent with body site  Organism: Pseudomonas aeruginosa (01-27-25 @ 17:11)  Organism: Pseudomonas aeruginosa (01-27-25 @ 17:11)      Method Type: MARIELENA      -  Aztreonam: S <=4      -  Cefepime: S <=2      -  Ceftazidime: S 4      -  Ciprofloxacin: S <=0.25      -  Imipenem: S 2      -  Levofloxacin: S <=0.5      -  Meropenem: S <=1      -  Piperacillin/Tazobactam: S <=8    Culture - Urine (collected 01-24-25 @ 17:16)  Source: Catheterized Catheterized  Final Report (01-26-25 @ 02:35):    No growth    Culture - Blood (collected 01-23-25 @ 15:14)  Source: .Blood BLOOD  Final Report (01-28-25 @ 20:00):    No growth at 5 days    Culture - Blood (collected 01-23-25 @ 15:14)  Source: .Blood BLOOD  Final Report (01-28-25 @ 20:00):    No growth at 5 days    Culture - Blood (collected 01-23-25 @ 12:21)  Source: .Blood BLOOD  Final Report (01-28-25 @ 17:00):    No growth at 5 days    Culture - Blood (collected 01-23-25 @ 12:21)  Source: .Blood BLOOD  Final Report (01-28-25 @ 17:00):    No growth at 5 days    Culture - Blood (collected 01-23-25 @ 00:15)  Source: .Blood BLOOD  Final Report (01-28-25 @ 04:01):    No growth at 5 days    Culture - Blood (collected 01-23-25 @ 00:10)  Source: .Blood BLOOD  Final Report (01-28-25 @ 04:01):    No growth at 5 days    Culture - Blood (collected 01-20-25 @ 01:52)  Source: .Blood BLOOD  Final Report (01-25-25 @ 09:00):    No growth at 5 days    Culture - Blood (collected 01-19-25 @ 19:28)  Source: .Blood BLOOD  Final Report (01-25-25 @ 04:00):    No growth at 5 days    Culture - Sputum (collected 01-18-25 @ 00:36)  Source: .Sputum Sputum  Gram Stain (01-18-25 @ 06:52):    Moderate polymorphonuclear leukocytes per low power field    No Squamous epithelial cells per low power field    No organisms seen per oil power field  Final Report (01-19-25 @ 08:17):    No growth    Culture - Blood (collected 01-17-25 @ 15:10)  Source: .Blood BLOOD  Final Report (01-22-25 @ 20:00):    No growth at 5 days    Culture - Blood (collected 01-17-25 @ 14:51)  Source: .Blood BLOOD  Final Report (01-22-25 @ 20:00):    No growth at 5 days    Radiology: reviewed     Medications:  albuterol/ipratropium for Nebulization 3 milliLiter(s) Nebulizer every 6 hours  atorvastatin 20 milliGRAM(s) Oral at bedtime  bacitracin   Ointment 1 Application(s) Topical two times a day  chlorhexidine 2% Cloths 1 Application(s) Topical <User Schedule>  chlorhexidine 2% Cloths 1 Application(s) Topical <User Schedule>  escitalopram 15 milliGRAM(s) Oral daily  gabapentin Solution 150 milliGRAM(s) Oral two times a day  heparin   Injectable 7500 Unit(s) SubCutaneous every 8 hours  influenza  Vaccine (HIGH DOSE) 0.5 milliLiter(s) IntraMuscular once  lacosamide IVPB 100 milliGRAM(s) IV Intermittent every 12 hours  lamoTRIgine 100 milliGRAM(s) Oral two times a day  levETIRAcetam  IVPB 750 milliGRAM(s) IV Intermittent two times a day  lidocaine   4% Patch 1 Patch Transdermal daily  lurasidone 40 milliGRAM(s) Oral daily  nystatin Powder 1 Application(s) Topical three times a day PRN  pantoprazole  Injectable 40 milliGRAM(s) IV Push daily  piperacillin/tazobactam IVPB.. 3.375 Gram(s) IV Intermittent every 12 hours  polyethylene glycol 3350 17 Gram(s) Oral daily  senna Syrup 10 milliLiter(s) Oral at bedtime  sevelamer carbonate Powder 800 milliGRAM(s) Oral three times a day with meals    Current Antimicrobials:  piperacillin/tazobactam IVPB.. 3.375 Gram(s) IV Intermittent every 12 hours    Prior/Completed Antimicrobials:  cefTRIAXone   IVPB  cefTRIAXone   IVPB  oseltamivir  piperacillin/tazobactam IVPB..  vancomycin  IVPB

## 2025-01-31 LAB
ANION GAP SERPL CALC-SCNC: 14 MMOL/L — SIGNIFICANT CHANGE UP (ref 5–17)
BASE EXCESS BLDA CALC-SCNC: -0.2 MMOL/L — SIGNIFICANT CHANGE UP (ref -2–3)
BUN SERPL-MCNC: 108 MG/DL — HIGH (ref 7–23)
CALCIUM SERPL-MCNC: 9.9 MG/DL — SIGNIFICANT CHANGE UP (ref 8.4–10.5)
CHLORIDE SERPL-SCNC: 114 MMOL/L — HIGH (ref 96–108)
CO2 BLDA-SCNC: 27 MMOL/L — HIGH (ref 19–24)
CO2 SERPL-SCNC: 22 MMOL/L — SIGNIFICANT CHANGE UP (ref 22–31)
CREAT SERPL-MCNC: 3.46 MG/DL — HIGH (ref 0.5–1.3)
EGFR: 19 ML/MIN/1.73M2 — LOW
GAS PNL BLDA: SIGNIFICANT CHANGE UP
GLUCOSE BLDC GLUCOMTR-MCNC: 123 MG/DL — HIGH (ref 70–99)
GLUCOSE BLDC GLUCOMTR-MCNC: 155 MG/DL — HIGH (ref 70–99)
GLUCOSE BLDC GLUCOMTR-MCNC: 185 MG/DL — HIGH (ref 70–99)
GLUCOSE SERPL-MCNC: 145 MG/DL — HIGH (ref 70–99)
HCO3 BLDA-SCNC: 25 MMOL/L — SIGNIFICANT CHANGE UP (ref 21–28)
HCT VFR BLD CALC: 31.8 % — LOW (ref 39–50)
HGB BLD-MCNC: 9.5 G/DL — LOW (ref 13–17)
HOROWITZ INDEX BLDA+IHG-RTO: 40 — SIGNIFICANT CHANGE UP
MCHC RBC-ENTMCNC: 29.9 G/DL — LOW (ref 32–36)
MCHC RBC-ENTMCNC: 30.7 PG — SIGNIFICANT CHANGE UP (ref 27–34)
MCV RBC AUTO: 102.9 FL — HIGH (ref 80–100)
NRBC # BLD: 0 /100 WBCS — SIGNIFICANT CHANGE UP (ref 0–0)
NRBC BLD-RTO: 0 /100 WBCS — SIGNIFICANT CHANGE UP (ref 0–0)
NT-PROBNP SERPL-SCNC: 1134 PG/ML — HIGH (ref 0–300)
PCO2 BLDA: 44 MMHG — SIGNIFICANT CHANGE UP (ref 35–48)
PH BLDA: 7.37 — SIGNIFICANT CHANGE UP (ref 7.35–7.45)
PLATELET # BLD AUTO: 241 K/UL — SIGNIFICANT CHANGE UP (ref 150–400)
PO2 BLDA: 100 MMHG — SIGNIFICANT CHANGE UP (ref 83–108)
POTASSIUM SERPL-MCNC: 4.2 MMOL/L — SIGNIFICANT CHANGE UP (ref 3.5–5.3)
POTASSIUM SERPL-SCNC: 4.2 MMOL/L — SIGNIFICANT CHANGE UP (ref 3.5–5.3)
RBC # BLD: 3.09 M/UL — LOW (ref 4.2–5.8)
RBC # FLD: 12.9 % — SIGNIFICANT CHANGE UP (ref 10.3–14.5)
SAO2 % BLDA: 97.3 % — SIGNIFICANT CHANGE UP (ref 94–98)
SODIUM SERPL-SCNC: 150 MMOL/L — HIGH (ref 135–145)
WBC # BLD: 6.09 K/UL — SIGNIFICANT CHANGE UP (ref 3.8–10.5)
WBC # FLD AUTO: 6.09 K/UL — SIGNIFICANT CHANGE UP (ref 3.8–10.5)

## 2025-01-31 RX ORDER — ACETAMINOPHEN 160 MG/5ML
650 SUSPENSION ORAL ONCE
Refills: 0 | Status: COMPLETED | OUTPATIENT
Start: 2025-01-31 | End: 2025-01-31

## 2025-01-31 RX ORDER — ACETYLCYSTEINE 200 MG/ML
3 VIAL (ML) MISCELLANEOUS EVERY 6 HOURS
Refills: 0 | Status: COMPLETED | OUTPATIENT
Start: 2025-01-31 | End: 2025-02-03

## 2025-01-31 RX ADMIN — IPRATROPIUM BROMIDE AND ALBUTEROL SULFATE 3 MILLILITER(S): .5; 2.5 SOLUTION RESPIRATORY (INHALATION) at 00:07

## 2025-01-31 RX ADMIN — IPRATROPIUM BROMIDE AND ALBUTEROL SULFATE 3 MILLILITER(S): .5; 2.5 SOLUTION RESPIRATORY (INHALATION) at 06:25

## 2025-01-31 RX ADMIN — LURASIDONE HYDROCHLORIDE 40 MILLIGRAM(S): 80 TABLET, FILM COATED ORAL at 11:20

## 2025-01-31 RX ADMIN — IPRATROPIUM BROMIDE AND ALBUTEROL SULFATE 3 MILLILITER(S): .5; 2.5 SOLUTION RESPIRATORY (INHALATION) at 11:19

## 2025-01-31 RX ADMIN — ANTISEPTIC SURGICAL SCRUB 1 APPLICATION(S): 0.04 SOLUTION TOPICAL at 06:33

## 2025-01-31 RX ADMIN — GABAPENTIN 150 MILLIGRAM(S): 800 TABLET ORAL at 06:27

## 2025-01-31 RX ADMIN — Medication 1 APPLICATION(S): at 17:19

## 2025-01-31 RX ADMIN — LIDOCAINE HYDROCHLORIDE 1 PATCH: 30 CREAM TOPICAL at 06:28

## 2025-01-31 RX ADMIN — Medication 7500 UNIT(S): at 22:04

## 2025-01-31 RX ADMIN — ACETAMINOPHEN 650 MILLIGRAM(S): 160 SUSPENSION ORAL at 11:19

## 2025-01-31 RX ADMIN — LEVETIRACETAM 400 MILLIGRAM(S): 750 TABLET, FILM COATED ORAL at 06:22

## 2025-01-31 RX ADMIN — Medication 1 APPLICATION(S): at 06:26

## 2025-01-31 RX ADMIN — LAMOTRIGINE 100 MILLIGRAM(S): 100 TABLET ORAL at 07:31

## 2025-01-31 RX ADMIN — SEVELAMER CARBONATE 800 MILLIGRAM(S): 800 TABLET, FILM COATED ORAL at 11:18

## 2025-01-31 RX ADMIN — LIDOCAINE HYDROCHLORIDE 1 PATCH: 30 CREAM TOPICAL at 19:10

## 2025-01-31 RX ADMIN — Medication 3 MILLILITER(S): at 11:18

## 2025-01-31 RX ADMIN — ACETAMINOPHEN 650 MILLIGRAM(S): 160 SUSPENSION ORAL at 12:42

## 2025-01-31 RX ADMIN — ESCITALOPRAM 15 MILLIGRAM(S): 10 TABLET, FILM COATED ORAL at 11:19

## 2025-01-31 RX ADMIN — PANTOPRAZOLE 40 MILLIGRAM(S): 20 TABLET, DELAYED RELEASE ORAL at 11:19

## 2025-01-31 RX ADMIN — GABAPENTIN 150 MILLIGRAM(S): 800 TABLET ORAL at 17:19

## 2025-01-31 RX ADMIN — LACOSAMIDE 120 MILLIGRAM(S): 200 TABLET, FILM COATED ORAL at 17:38

## 2025-01-31 RX ADMIN — Medication 7500 UNIT(S): at 14:09

## 2025-01-31 RX ADMIN — Medication 3 MILLILITER(S): at 17:39

## 2025-01-31 RX ADMIN — LACOSAMIDE 120 MILLIGRAM(S): 200 TABLET, FILM COATED ORAL at 06:21

## 2025-01-31 RX ADMIN — LEVETIRACETAM 400 MILLIGRAM(S): 750 TABLET, FILM COATED ORAL at 17:39

## 2025-01-31 RX ADMIN — IPRATROPIUM BROMIDE AND ALBUTEROL SULFATE 3 MILLILITER(S): .5; 2.5 SOLUTION RESPIRATORY (INHALATION) at 17:38

## 2025-01-31 RX ADMIN — LAMOTRIGINE 100 MILLIGRAM(S): 100 TABLET ORAL at 17:39

## 2025-01-31 RX ADMIN — LIDOCAINE HYDROCHLORIDE 1 PATCH: 30 CREAM TOPICAL at 07:00

## 2025-01-31 RX ADMIN — ATORVASTATIN CALCIUM 20 MILLIGRAM(S): 80 TABLET, FILM COATED ORAL at 22:04

## 2025-01-31 RX ADMIN — Medication 7500 UNIT(S): at 06:34

## 2025-01-31 RX ADMIN — SEVELAMER CARBONATE 800 MILLIGRAM(S): 800 TABLET, FILM COATED ORAL at 17:39

## 2025-01-31 NOTE — PROGRESS NOTE ADULT - SUBJECTIVE AND OBJECTIVE BOX
Date of Service: 01-31-25 @ 09:17    Patient is a 69y old  Male who presents with a chief complaint of seizure, flu, elevated trop (31 Jan 2025 06:56)      Any change in ROS:   No new respiratory events overnight. Denies SOB/CP.  O2 sats 93% on 5LNC  AVAPS qHS     MEDICATIONS  (STANDING):  albuterol/ipratropium for Nebulization 3 milliLiter(s) Nebulizer every 6 hours  atorvastatin 20 milliGRAM(s) Oral at bedtime  bacitracin   Ointment 1 Application(s) Topical two times a day  chlorhexidine 2% Cloths 1 Application(s) Topical <User Schedule>  chlorhexidine 2% Cloths 1 Application(s) Topical <User Schedule>  escitalopram 15 milliGRAM(s) Oral daily  gabapentin Solution 150 milliGRAM(s) Oral two times a day  heparin   Injectable 7500 Unit(s) SubCutaneous every 8 hours  influenza  Vaccine (HIGH DOSE) 0.5 milliLiter(s) IntraMuscular once  lacosamide IVPB 100 milliGRAM(s) IV Intermittent every 12 hours  lamoTRIgine 100 milliGRAM(s) Oral two times a day  levETIRAcetam  IVPB 750 milliGRAM(s) IV Intermittent two times a day  lidocaine   4% Patch 1 Patch Transdermal daily  lurasidone 40 milliGRAM(s) Oral daily  pantoprazole  Injectable 40 milliGRAM(s) IV Push daily  polyethylene glycol 3350 17 Gram(s) Oral daily  senna Syrup 10 milliLiter(s) Oral at bedtime  sevelamer carbonate Powder 800 milliGRAM(s) Oral three times a day with meals    MEDICATIONS  (PRN):  nystatin Powder 1 Application(s) Topical three times a day PRN fungal infection you may see and want to treat    Vital Signs Last 24 Hrs  T(C): 36.6 (31 Jan 2025 08:23), Max: 37.1 (30 Jan 2025 15:33)  T(F): 97.9 (31 Jan 2025 08:23), Max: 98.7 (30 Jan 2025 15:33)  HR: 90 (31 Jan 2025 08:23) (80 - 90)  BP: 148/83 (31 Jan 2025 08:23) (121/78 - 148/83)  BP(mean): --  RR: 18 (31 Jan 2025 08:23) (18 - 18)  SpO2: 93% (31 Jan 2025 08:23) (93% - 100%)    Parameters below as of 31 Jan 2025 08:23  Patient On (Oxygen Delivery Method): nasal cannula  O2 Flow (L/min): 5      I&O's Summary    30 Jan 2025 07:01  -  31 Jan 2025 07:00  --------------------------------------------------------  IN: 0 mL / OUT: 1250 mL / NET: -1250 mL          Physical Exam:   GENERAL: NAD, well-groomed, well-developed  HEENT: BREE/   Atraumatic, Normocephalic  ENMT: No tonsillar erythema, exudates, or enlargement; Moist mucous membranes, Good dentition, No lesions  NECK: Supple, No JVD, Normal thyroid  CHEST/LUNG: Few scattered rhonchi, decreased BS R base   CVS: Regular rate and rhythm; No murmurs, rubs, or gallops  GI: : Soft, Nontender, Nondistended; Bowel sounds present  NERVOUS SYSTEM:  Alert & Oriented X3  EXTREMITIES:  2+ Peripheral Pulses, No clubbing, cyanosis, or edema  LYMPH: No lymphadenopathy noted  SKIN: No rashes or lesions  ENDOCRINOLOGY: No Thyromegaly  PSYCH: Appropriate    Labs:  ABG - ( 31 Jan 2025 06:00 )  pH, Arterial: 7.37  pH, Blood: x     /  pCO2: 44    /  pO2: 100   / HCO3: 25    / Base Excess: -0.2  /  SaO2: 97.3            24, 21.0, 30.0, 30.0                            9.5    6.09  )-----------( 241      ( 31 Jan 2025 06:59 )             31.8                         9.6    7.16  )-----------( 237      ( 30 Jan 2025 07:15 )             31.2                         9.9    7.01  )-----------( 215      ( 29 Jan 2025 00:40 )             31.9                         9.7    6.50  )-----------( 192      ( 28 Jan 2025 00:17 )             30.0     01-31    150[H]  |  114[H]  |  108[H]  ----------------------------<  145[H]  4.2   |  22  |  3.46[H]  01-30    147[H]  |  109[H]  |  117[H]  ----------------------------<  135[H]  3.9   |  22  |  4.25[H]  01-29    146[H]  |  106  |  117[H]  ----------------------------<  135[H]  3.3[L]   |  21[L]  |  4.71[H]  01-29    147[H]  |  105  |  127[H]  ----------------------------<  146[H]  3.3[L]   |  20[L]  |  5.08[H]  01-28    148[H]  |  106  |  114[H]  ----------------------------<  123[H]  3.8   |  21[L]  |  4.86[H]  01-28    146[H]  |  103  |  113[H]  ----------------------------<  139[H]  3.8   |  18[L]  |  4.80[H]    Ca    9.9      31 Jan 2025 06:59  Ca    9.6      30 Jan 2025 07:12  Ca    9.5      29 Jan 2025 11:56  Phos  5.2     01-30  Phos  8.1     01-29  Mg     3.6     01-30  Mg     3.6     01-29    TPro  6.8  /  Alb  3.4  /  TBili  0.3  /  DBili  x   /  AST  50[H]  /  ALT  55[H]  /  AlkPhos  137[H]  01-30  TPro  7.1  /  Alb  3.4  /  TBili  0.3  /  DBili  x   /  AST  62[H]  /  ALT  62[H]  /  AlkPhos  139[H]  01-29  TPro  6.9  /  Alb  3.4  /  TBili  0.4  /  DBili  x   /  AST  63[H]  /  ALT  67[H]  /  AlkPhos  142[H]  01-29  TPro  7.3  /  Alb  3.7  /  TBili  0.4  /  DBili  x   /  AST  67[H]  /  ALT  72[H]  /  AlkPhos  136[H]  01-28  TPro  6.8  /  Alb  3.1[L]  /  TBili  0.4  /  DBili  x   /  AST  65[H]  /  ALT  66[H]  /  AlkPhos  134[H]  01-28    CAPILLARY BLOOD GLUCOSE      POCT Blood Glucose.: 155 mg/dL (31 Jan 2025 05:45)  POCT Blood Glucose.: 115 mg/dL (30 Jan 2025 23:36)  POCT Blood Glucose.: 130 mg/dL (30 Jan 2025 18:13)  POCT Blood Glucose.: 119 mg/dL (30 Jan 2025 12:02)      LIVER FUNCTIONS - ( 30 Jan 2025 07:12 )  Alb: 3.4 g/dL / Pro: 6.8 g/dL / ALK PHOS: 137 U/L / ALT: 55 U/L / AST: 50 U/L / GGT: x           PT/INR - ( 30 Jan 2025 07:17 )   PT: 10.0 sec;   INR: 0.87 ratio         PTT - ( 30 Jan 2025 07:17 )  PTT:26.7 sec  Urinalysis Basic - ( 31 Jan 2025 06:59 )    Color: x / Appearance: x / SG: x / pH: x  Gluc: 145 mg/dL / Ketone: x  / Bili: x / Urobili: x   Blood: x / Protein: x / Nitrite: x   Leuk Esterase: x / RBC: x / WBC x   Sq Epi: x / Non Sq Epi: x / Bacteria: x            RECENT CULTURES:  01-25 @ 07:21 .Sputum Sputum   MARIELENA    Numerous polymorphonuclear leukocytes per low power field  Rare Squamous epithelial cells per low power field  No organisms seen    Pseudomonas aeruginosa  Pseudomonas aeruginosa     Rare Pseudomonas aeruginosa  Commensal lucia consistent with body site    01-24 @ 17:16 Catheterized Catheterized                No growth        Studies        < from: CT Chest No Cont (01.30.25 @ 13:37) >    ACC: [id number] EXAM:  CT CHEST   ORDERED BY: GERSON MORTON     PROCEDURE DATE:  01/30/2025          INTERPRETATION:  CLINICAL INFORMATION: Hypoxia, sepsis. Concern for   pneumonia. History of testicular cancer.    COMPARISON: CT chest 1/17/2025.    CONTRAST/COMPLICATIONS:  IV Contrast: NONE  Oral Contrast: NONE  .    PROCEDURE:  CT scan of the chest was obtained without intravenous contrast.    FINDINGS:    AIRWAYS/LUNGS/PLEURA: New secretions at the level of the bronchus   intermedius, with resulting complete right middle and lower lobe   consolidation/collapse. New scattered groundglass opacities are noted in   bilateral upper lobes. Unchanged mild lingular and lower lobe atelectasis   with mild bronchiectasis. No pleural effusion.    MEDIASTINUM AND HA: No lymphadenopathy. Right lower pole thyroid   nodule, better assessed on recent ultrasound from 1/24/2025.    VESSELS: Right IJ approach medication port with tip in the right atrium.   Aortic and coronary artery calcifications.    HEART: Heart size is normal. No pericardial effusion.    VISUALIZED UPPER ABDOMEN: Enteric tube terminates in the stomach on    imaging    CHEST WALL AND BONES: Thoracic spinal fusion hardware. No aggressive   osseous lesion. Old right clavicular and bilateral anterior rib fractures.    IMPRESSION:  New secretions at the level of the bronchus intermedius with complete   right middle/lower bilobar consolidation/collapse.    New scattered bilateral upper lobe groundglass opacities, concerning for   infection.    --- End of Report ---    < end of copied text >

## 2025-01-31 NOTE — PROGRESS NOTE ADULT - ASSESSMENT
69M w/ hx of metastatic testicular cancer (s/p L orchiectomy, on etoposide/cisplatin chemo), epilepsy (grand-mal seizures), schizophrenia, developmental delay, HTN, HLD, VAZQUEZ, stage III CKD, severe obesity, secondary hyperparathyroidism, anemia, thrombocytopenia who presented to ED for fall, possible seizure prior.    Plan:    # Hypoxia: Sepsis/sirs, AHRF possible flash pulmonary edema iso uncontrolled HTN, c/f aspiration pneumonia   - S/p RRT 1/23 am and 1/23 pm, for inc WOB on AVAPS, s/p intubation and transfer to MICU, required pressor support-> downgraded 1/29  - Sepsis protocol initiated  - CXR w/ mild pulm congestion.  - Diuresis as tolerated  - Thyroid US w/ nodule  - NGT in place, c/w TF's as tolerated-> FEEST -> rec pureed /mod thick liquids   - 1/25 CXR with R infiltrate, Bcx remain NGTD, WBC down  - 1/25 Scx with rare Pseudomonas aeruginosa -pansensitive   - MRI brain with metastatic disease  - 1/28 s/p extubation   - Discontinued zosyn --completed 7d course 1/29  - Monitor off antibiotics per ID rec's  - ID and Pulm following    # Syncopal seizure:  - Patient with hx of epilepsy, generalized grand mal seizures  - C/w Lamictal and Keppra  - CTH/C spine/MF negative for acute ICH, notable for mild R hematoma  - 1/17 sp multiple RRTs  for grand mal seizure activity and was initially recommended sepsis workup and antipyretics given T103F, but patient had x2 more episode and was ultimately given ativan 2mg, vimpat load 200mg, and intubated for airway protection with MICU transfer.   - Extubated 1/20  - Keppra 1.5g BID, Lamotrigine 100mg BID, Gabapentin 300mg BID, Vimpat 150mg BID  - Ativan for seizures PRM  - Per Neuro -> MRI brain w and wo contrast to look for potential seizure foci that could cause increased frequency especially given hx of metastatic testicular cancer; deferred MRI given copious secretions   - s/p vEEG, negative for seizures  - Care per MICU-> downgraded 1/21, back to ICU 1/23 for Hypoxia, Increased WOB, Downgraded 1/29    # Brain lesion:  - MRI brain now with 5.4 mm enhancing lesion in the LEFT middle cerebellar peduncle with associated edema suspicious for metastases  - MRI Lumbar negative for acute disease  - Neurology recs noted, new lesion not likely to cause seizure  - Seen by Heme Onc-> will need another PET-CT, can be completed outpatient once stable if concern can proceed with biopsy at that time   - Heme Onc following    # Flu:  - sp Tamiflu completed 5d on 1/22   - Monitor temps/WBC  - ID following    # CKD3: Cr 4.25 1/30, pending Cr this AM  - Monitor I/O's  - Serial Cr  - Avoid nephrotoxins  - Renally dose medications  - Renal following->  if renal function does not begin to improve will need dialysis     # HTN/ HLD:  - C/w CV meds    # Pneumonia:  - Completed ceftriaxone 5d course on 1/21  - Retreated for aspiration pneumonia in ICU on 1/23  - ID following    # Schizophrenia/Depression:  - C/w current meds    # VAZQUEZ:  - CPAP at night    # Hx of metastatic testicular cancer, s/p L orchiectomy, on etoposide/cisplatin chemo, last dose on 6/21/24:  - Outpt Oncology follow-up    # DVT ppx:  - Heparin sq    [facility]  [phone]

## 2025-01-31 NOTE — PROGRESS NOTE ADULT - SUBJECTIVE AND OBJECTIVE BOX
[facility]  JACINTO Horton Y. Patel, S. Shah, G. Casimir  [phone]  ([phone] - weekdays after 5pm and weekends)    Name: OSCAR MILAN  Age/Gender: 69y Male  MRN: [id number]    Interval History:  Patient seen and examined this morning.   States cough same, no dyspnea, pain or fevers.  No new complaints noted.  Notes reviewed  No concerning overnight events  Afebrile. Sister at bedside.   Allergies: penicillin (Hives)  seasonal allergies (Unknown)      Objective:  Vitals:   T(F): 97.9 (01-31-25 @ 08:23), Max: 98.7 (01-30-25 @ 15:33)  HR: 95 (01-31-25 @ 10:00) (80 - 95)  BP: 148/83 (01-31-25 @ 08:23) (121/78 - 148/83)  RR: 18 (01-31-25 @ 08:23) (18 - 18)  SpO2: 96% (01-31-25 @ 10:00) (93% - 100%)  Physical Examination:  General: no acute distress, NC  HEENT: NC/AT, lip lesions with dried blood, NGT  Respiratory: decreased breath sounds b/l   Cardiovascular: S1 and S2 present, normal rate   Gastrointestinal: nontender, nondistended  Extremities: no edema, no cyanosis  Skin: no visible rash    Laboratory Studies:  CBC:                       9.5    6.09  )-----------( 241      ( 31 Jan 2025 06:59 )             31.8     WBC Trend:  6.09 01-31-25 @ 06:59  7.16 01-30-25 @ 07:15  7.01 01-29-25 @ 00:40  6.50 01-28-25 @ 00:17  6.98 01-27-25 @ 00:32  9.04 01-26-25 @ 00:16  12.77 01-25-25 @ 01:18    CMP: 01-31    150[H]  |  114[H]  |  108[H]  ----------------------------<  145[H]  4.2   |  22  |  3.46[H]    Ca    9.9      31 Jan 2025 06:59  Phos  5.2     01-30  Mg     3.6     01-30    TPro  6.8  /  Alb  3.4  /  TBili  0.3  /  DBili  x   /  AST  50[H]  /  ALT  55[H]  /  AlkPhos  137[H]  01-30    Creatinine: 3.46 mg/dL (01-31-25 @ 06:59)  Creatinine: 4.25 mg/dL (01-30-25 @ 07:12)  Creatinine: 4.71 mg/dL (01-29-25 @ 11:56)  Creatinine: 5.08 mg/dL (01-29-25 @ 00:39)  Creatinine: 4.86 mg/dL (01-28-25 @ 15:25)  Creatinine: 4.80 mg/dL (01-28-25 @ 00:17)  Creatinine: 4.68 mg/dL (01-27-25 @ 00:32)  Creatinine: 4.66 mg/dL (01-26-25 @ 12:10)  Creatinine: 4.70 mg/dL (01-26-25 @ 00:17)  Creatinine: 4.76 mg/dL (01-25-25 @ 17:46)  Creatinine: 4.70 mg/dL (01-25-25 @ 11:31)  Creatinine: 4.31 mg/dL (01-25-25 @ 01:18)  Creatinine: 3.92 mg/dL (01-24-25 @ 17:15)    LIVER FUNCTIONS - ( 30 Jan 2025 07:12 )  Alb: 3.4 g/dL / Pro: 6.8 g/dL / ALK PHOS: 137 U/L / ALT: 55 U/L / AST: 50 U/L / GGT: x           Microbiology: reviewed   Culture - Sputum (collected 01-25-25 @ 07:21)  Source: .Sputum Sputum  Gram Stain (01-26-25 @ 23:12):    Numerous polymorphonuclear leukocytes per low power field    Rare Squamous epithelial cells per low power field    No organisms seen  Final Report (01-27-25 @ 17:11):    Rare Pseudomonas aeruginosa    Commensal lucia consistent with body site  Organism: Pseudomonas aeruginosa (01-27-25 @ 17:11)  Organism: Pseudomonas aeruginosa (01-27-25 @ 17:11)      Method Type: MARIELENA      -  Aztreonam: S <=4      -  Cefepime: S <=2      -  Ceftazidime: S 4      -  Ciprofloxacin: S <=0.25      -  Imipenem: S 2      -  Levofloxacin: S <=0.5      -  Meropenem: S <=1      -  Piperacillin/Tazobactam: S <=8    Culture - Urine (collected 01-24-25 @ 17:16)  Source: Catheterized Catheterized  Final Report (01-26-25 @ 02:35):    No growth    Culture - Blood (collected 01-23-25 @ 15:14)  Source: .Blood BLOOD  Final Report (01-28-25 @ 20:00):    No growth at 5 days    Culture - Blood (collected 01-23-25 @ 15:14)  Source: .Blood BLOOD  Final Report (01-28-25 @ 20:00):    No growth at 5 days    Culture - Blood (collected 01-23-25 @ 12:21)  Source: .Blood BLOOD  Final Report (01-28-25 @ 17:00):    No growth at 5 days    Culture - Blood (collected 01-23-25 @ 12:21)  Source: .Blood BLOOD  Final Report (01-28-25 @ 17:00):    No growth at 5 days    Culture - Blood (collected 01-23-25 @ 00:15)  Source: .Blood BLOOD  Final Report (01-28-25 @ 04:01):    No growth at 5 days    Culture - Blood (collected 01-23-25 @ 00:10)  Source: .Blood BLOOD  Final Report (01-28-25 @ 04:01):    No growth at 5 days    Culture - Blood (collected 01-20-25 @ 01:52)  Source: .Blood BLOOD  Final Report (01-25-25 @ 09:00):    No growth at 5 days    Culture - Blood (collected 01-19-25 @ 19:28)  Source: .Blood BLOOD  Final Report (01-25-25 @ 04:00):    No growth at 5 days    Culture - Sputum (collected 01-18-25 @ 00:36)  Source: .Sputum Sputum  Gram Stain (01-18-25 @ 06:52):    Moderate polymorphonuclear leukocytes per low power field    No Squamous epithelial cells per low power field    No organisms seen per oil power field  Final Report (01-19-25 @ 08:17):    No growth    Culture - Blood (collected 01-17-25 @ 15:10)  Source: .Blood BLOOD  Final Report (01-22-25 @ 20:00):    No growth at 5 days    Culture - Blood (collected 01-17-25 @ 14:51)  Source: .Blood BLOOD  Final Report (01-22-25 @ 20:00):    No growth at 5 days    Radiology: reviewed   < from: CT Chest No Cont (01.30.25 @ 13:37) >  IMPRESSION:  New secretions at the level of the bronchus intermedius with complete   right middle/lower bilobar consolidation/collapse.    New scattered bilateral upper lobe groundglass opacities, concerning for   infection.    --- End of Report ---    < end of copied text >  < from: Xray Chest 1 View- PORTABLE-Urgent (Xray Chest 1 View- PORTABLE-Urgent .) (01.29.25 @ 19:10) >  IMPRESSION:  Small bilateral pleural effusions with associated atelectasis.    < end of copied text >  < from: Xray Chest 1 View- PORTABLE-Urgent (Xray Chest 1 View- PORTABLE-Urgent .) (01.28.25 @ 17:44) >  FINDINGS:    Interval removal of endotracheal tube. Enteric tube overlies the stomach.   Right chest wall port with tip in the right atrium.  Bibasilar atelectasis. No focal consolidation, large pleural effusion or   pneumothorax.  The heart size cannot be accurately assessed on this projection.  No acute osseous abnormalities. Thoracolumbar spine hardware.    IMPRESSION:  Support devices as described above.    < end of copied text >    Medications:  acetylcysteine 20%  Inhalation 3 milliLiter(s) Inhalation every 6 hours  albuterol/ipratropium for Nebulization 3 milliLiter(s) Nebulizer every 6 hours  atorvastatin 20 milliGRAM(s) Oral at bedtime  bacitracin   Ointment 1 Application(s) Topical two times a day  chlorhexidine 2% Cloths 1 Application(s) Topical <User Schedule>  chlorhexidine 2% Cloths 1 Application(s) Topical <User Schedule>  escitalopram 15 milliGRAM(s) Oral daily  gabapentin Solution 150 milliGRAM(s) Oral two times a day  heparin   Injectable 7500 Unit(s) SubCutaneous every 8 hours  influenza  Vaccine (HIGH DOSE) 0.5 milliLiter(s) IntraMuscular once  lacosamide IVPB 100 milliGRAM(s) IV Intermittent every 12 hours  lamoTRIgine 100 milliGRAM(s) Oral two times a day  levETIRAcetam  IVPB 750 milliGRAM(s) IV Intermittent two times a day  lidocaine   4% Patch 1 Patch Transdermal daily  lurasidone 40 milliGRAM(s) Oral daily  nystatin Powder 1 Application(s) Topical three times a day PRN  pantoprazole  Injectable 40 milliGRAM(s) IV Push daily  polyethylene glycol 3350 17 Gram(s) Oral daily  senna Syrup 10 milliLiter(s) Oral at bedtime  sevelamer carbonate Powder 800 milliGRAM(s) Oral three times a day with meals    Prior/Completed Antimicrobials:  cefTRIAXone   IVPB  cefTRIAXone   IVPB  oseltamivir  piperacillin/tazobactam IVPB..  vancomycin  IVPB

## 2025-01-31 NOTE — PROGRESS NOTE ADULT - SUBJECTIVE AND OBJECTIVE BOX
[facility] HEMATOLOGY/ONCOLOGY INPATIENT PROGRESS NOTE     Interval Hx:   01-31-25: Mr. Gr was seen at bedside today.    Meds:   MEDICATIONS  (STANDING):  albuterol/ipratropium for Nebulization 3 milliLiter(s) Nebulizer every 6 hours  atorvastatin 20 milliGRAM(s) Oral at bedtime  bacitracin   Ointment 1 Application(s) Topical two times a day  chlorhexidine 2% Cloths 1 Application(s) Topical <User Schedule>  chlorhexidine 2% Cloths 1 Application(s) Topical <User Schedule>  escitalopram 15 milliGRAM(s) Oral daily  gabapentin Solution 150 milliGRAM(s) Oral two times a day  heparin   Injectable 7500 Unit(s) SubCutaneous every 8 hours  influenza  Vaccine (HIGH DOSE) 0.5 milliLiter(s) IntraMuscular once  lacosamide IVPB 100 milliGRAM(s) IV Intermittent every 12 hours  lamoTRIgine 100 milliGRAM(s) Oral two times a day  levETIRAcetam  IVPB 750 milliGRAM(s) IV Intermittent two times a day  lidocaine   4% Patch 1 Patch Transdermal daily  lurasidone 40 milliGRAM(s) Oral daily  pantoprazole  Injectable 40 milliGRAM(s) IV Push daily  polyethylene glycol 3350 17 Gram(s) Oral daily  senna Syrup 10 milliLiter(s) Oral at bedtime  sevelamer carbonate Powder 800 milliGRAM(s) Oral three times a day with meals    MEDICATIONS  (PRN):  nystatin Powder 1 Application(s) Topical three times a day PRN fungal infection you may see and want to treat    Vital Signs Last 24 Hrs  T(C): 36.9 (31 Jan 2025 00:29), Max: 37.1 (30 Jan 2025 15:33)  T(F): 98.5 (31 Jan 2025 00:29), Max: 98.7 (30 Jan 2025 15:33)  HR: 84 (31 Jan 2025 00:29) (80 - 86)  BP: 123/78 (31 Jan 2025 00:29) (121/78 - 136/79)  BP(mean): --  RR: 18 (31 Jan 2025 00:29) (18 - 18)  SpO2: 97% (31 Jan 2025 00:29) (96% - 100%)    Parameters below as of 31 Jan 2025 00:29  Patient On (Oxygen Delivery Method): nasal cannula  O2 Flow (L/min): 6    Physical Exam:  Gen: NAD  HEENT: EOMI, MMM  Chest: equal chest rise  Cardiac: regular   Abd: non distended   Neuro: AAOx2    Labs:                        9.6    7.16  )-----------( 237      ( 30 Jan 2025 07:15 )             31.2     CBC Full  -  ( 30 Jan 2025 07:15 )  WBC Count : 7.16 K/uL  RBC Count : 3.05 M/uL  Hemoglobin : 9.6 g/dL  Hematocrit : 31.2 %  Platelet Count - Automated : 237 K/uL  Mean Cell Volume : 102.3 fl  Mean Cell Hemoglobin : 31.5 pg  Mean Cell Hemoglobin Concentration : 30.8 g/dL  Auto Neutrophil # : 5.38 K/uL  Auto Lymphocyte # : 0.77 K/uL  Auto Monocyte # : 0.49 K/uL  Auto Eosinophil # : 0.36 K/uL  Auto Basophil # : 0.03 K/uL  Auto Neutrophil % : 75.2 %  Auto Lymphocyte % : 10.8 %  Auto Monocyte % : 6.8 %  Auto Eosinophil % : 5.0 %  Auto Basophil % : 0.4 %    01-30    147[H]  |  109[H]  |  117[H]  ----------------------------<  135[H]  3.9   |  22  |  4.25[H]    Ca    9.6      30 Jan 2025 07:12  Phos  5.2     01-30  Mg     3.6     01-30    TPro  6.8  /  Alb  3.4  /  TBili  0.3  /  DBili  x   /  AST  50[H]  /  ALT  55[H]  /  AlkPhos  137[H]  01-30    PT/INR - ( 30 Jan 2025 07:17 )   PT: 10.0 sec;   INR: 0.87 ratio         PTT - ( 30 Jan 2025 07:17 )  PTT:26.7 sec  Bilirubin Total: 0.3 mg/dL (01-30-25 @ 07:12)   [facility] HEMATOLOGY/ONCOLOGY INPATIENT PROGRESS NOTE     Interval Hx:   01-31-25: Mr. Gr was seen at bedside today, no overnight events noted, repeat CT chest w/o contrast noted with new secretions at the level of the bronchus intermedius with complete right middle/lower bilobar consolidation/collapse and new scattered bilateral upper lobe groundglass opacities, concerning for infection. Discussed with pts wife and discussed with primary Oncologist Dr. Ovalles, agree with current plan    Meds:   MEDICATIONS  (STANDING):  albuterol/ipratropium for Nebulization 3 milliLiter(s) Nebulizer every 6 hours  atorvastatin 20 milliGRAM(s) Oral at bedtime  bacitracin   Ointment 1 Application(s) Topical two times a day  chlorhexidine 2% Cloths 1 Application(s) Topical <User Schedule>  chlorhexidine 2% Cloths 1 Application(s) Topical <User Schedule>  escitalopram 15 milliGRAM(s) Oral daily  gabapentin Solution 150 milliGRAM(s) Oral two times a day  heparin   Injectable 7500 Unit(s) SubCutaneous every 8 hours  influenza  Vaccine (HIGH DOSE) 0.5 milliLiter(s) IntraMuscular once  lacosamide IVPB 100 milliGRAM(s) IV Intermittent every 12 hours  lamoTRIgine 100 milliGRAM(s) Oral two times a day  levETIRAcetam  IVPB 750 milliGRAM(s) IV Intermittent two times a day  lidocaine   4% Patch 1 Patch Transdermal daily  lurasidone 40 milliGRAM(s) Oral daily  pantoprazole  Injectable 40 milliGRAM(s) IV Push daily  polyethylene glycol 3350 17 Gram(s) Oral daily  senna Syrup 10 milliLiter(s) Oral at bedtime  sevelamer carbonate Powder 800 milliGRAM(s) Oral three times a day with meals    MEDICATIONS  (PRN):  nystatin Powder 1 Application(s) Topical three times a day PRN fungal infection you may see and want to treat    Vital Signs Last 24 Hrs  T(C): 36.9 (31 Jan 2025 00:29), Max: 37.1 (30 Jan 2025 15:33)  T(F): 98.5 (31 Jan 2025 00:29), Max: 98.7 (30 Jan 2025 15:33)  HR: 84 (31 Jan 2025 00:29) (80 - 86)  BP: 123/78 (31 Jan 2025 00:29) (121/78 - 136/79)  BP(mean): --  RR: 18 (31 Jan 2025 00:29) (18 - 18)  SpO2: 97% (31 Jan 2025 00:29) (96% - 100%)    Parameters below as of 31 Jan 2025 00:29  Patient On (Oxygen Delivery Method): nasal cannula  O2 Flow (L/min): 6    Physical Exam:  Gen: NAD  HEENT: EOMI, MMM  Chest: equal chest rise  Cardiac: regular   Abd: non distended   Neuro: AAOx2    Labs:                        9.6    7.16  )-----------( 237      ( 30 Jan 2025 07:15 )             31.2     CBC Full  -  ( 30 Jan 2025 07:15 )  WBC Count : 7.16 K/uL  RBC Count : 3.05 M/uL  Hemoglobin : 9.6 g/dL  Hematocrit : 31.2 %  Platelet Count - Automated : 237 K/uL  Mean Cell Volume : 102.3 fl  Mean Cell Hemoglobin : 31.5 pg  Mean Cell Hemoglobin Concentration : 30.8 g/dL  Auto Neutrophil # : 5.38 K/uL  Auto Lymphocyte # : 0.77 K/uL  Auto Monocyte # : 0.49 K/uL  Auto Eosinophil # : 0.36 K/uL  Auto Basophil # : 0.03 K/uL  Auto Neutrophil % : 75.2 %  Auto Lymphocyte % : 10.8 %  Auto Monocyte % : 6.8 %  Auto Eosinophil % : 5.0 %  Auto Basophil % : 0.4 %    01-30    147[H]  |  109[H]  |  117[H]  ----------------------------<  135[H]  3.9   |  22  |  4.25[H]    Ca    9.6      30 Jan 2025 07:12  Phos  5.2     01-30  Mg     3.6     01-30    TPro  6.8  /  Alb  3.4  /  TBili  0.3  /  DBili  x   /  AST  50[H]  /  ALT  55[H]  /  AlkPhos  137[H]  01-30    PT/INR - ( 30 Jan 2025 07:17 )   PT: 10.0 sec;   INR: 0.87 ratio         PTT - ( 30 Jan 2025 07:17 )  PTT:26.7 sec  Bilirubin Total: 0.3 mg/dL (01-30-25 @ 07:12)

## 2025-01-31 NOTE — PROGRESS NOTE ADULT - ASSESSMENT
Mr. Gr is a 69 year old male with PMHx of seizure disorder, sleep apnea, HTN, chronic idiopathic constipation, stage III CKD, severe obesity, secondary hyperparathyroidism, anemia, thrombocytopenia, hyperlipidemia, testicular cancer s/p EP x 4 under the care of Dr. Ovalles who is admitted s/p fall in setting of possible seizure since 01/16/2025. He was intubated early in his admission due to seziures and extubated 1/21/2025 and then again intubated in setting of respiratory distress, requiring diuresis, with SHAGUFTA on CKD, in the MICU. Imaging shows possible brain metastatic disease and lung mass as well as abnormal thyroid nodule.     Former smoker, quit 14y prior, denied ETOH, drug use. Lives at home. Does not have children. Denied family history of blood disorders or malignancy.  Denied previous workplace related exposures.  Denied previous history of blood transfusions.  Denied history of thromboses.  Denied history of  requiring anticoagulation.        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  Margins were negative.  pT3Nx. CT C/A/P with few left para-aortic and left iliac chain lymph nodes largest measuring 8.1 cm left para-aortic node, bilateral subcentimeter noncalcified pulmonary nodules measuring up to 7 mm. AFP, LDH, hCG were all elevated. 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 Completed four cycles of adjuvant therapy with Cisplatin+Etoposide, with cisplatin dose reduced 50% and Etoposide 50% due to thrombocytopenia at that time. Subsequent scans 08/2024 showed resolution of disease and negative markers,         01/26/2025: continues intubated and sedated in the ICU, discussed with ICU team yesterday, pending markers, endocrinology eval noted     01/27/2025:  AFP/BHCG normal,     01/28/2025: continues in MICU intubated, off sedation, Neurology recs noted, L cerebellar lesion not likely be cause of seizure, GOC noted per palliative discussion, pts primary Oncologist aware of current events as well     01/29/2025: , awake and alert, extubated 01/28/2025, continues in MICU     01/30/2025: now on floor, bipap, no overnight events noted        # Brain lesion  # Seizures  - Seizures noted, pt with hx of seizures  - MRI brain now with 5.4 mm enhancing lesion in the LEFT middle cerebellar peduncle with associated edema suspicious for metastases  - MRI Lumbar negative for acute disease  - Small lesion without mass effect or edema, recommended to correlate per neurology if foci and locus are concordant with seizure activity  - Neurology recs noted, new lesion not likely to cause seizure  - It is rare, but nonetheless not impossible, for testicular cancer to be metastatic to the brain  - He will need another PET-CT, can be completed outpatient once stable if concern can proceed with biopsy at that time   - Endocrinology eval for thyroid nodule  - AFP/BHCG normal,      # Thyroid nodule  - US Thyroid 01/24/2025 with 1.6cm complex mixed solid cystic hypoechoic nodule in the lower pole of the right thyroid lobe, TIRADS 5.  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 Ovalles 12/03/2024, markers continued to be negative  - See above in regards to brain lesion  - MRI Neck shows 4.2 cm RIGHT upper lobe mass/infiltrate  - Recommend IR guided biopsy of RUL mass, currently intubated and sedate likely not possible, PET-CT as outpt and then consideration after better characterization   - Follow up as outpatient with Franki Ovalles MD     # Thrombocytopenia  - Labile  - Coags wnl, LFTs elevated  - Continue to trend  - Transfuse to maintain >10k, if actively bleeding then maintain >50k     # Acute kidney injury on CKD Stage IIIA  - Worsening    - Likely multifactorial at this point  - Nephrology consult and recs noted  - Continue to trend     # Leukocytosis, Neutrophilia  - Likely reactive, improved  - Continue to trend     # Normocytic anemia  - Chronic, has hx of PRBC during chemo 07/2024  - Noted Anti E, Anti-K Anti-C antibodies previously  - Monitor for bleeding  - Recommend to transfuse to maintain Hb > 7        Thank you for allowing me to participate in the care Mr. Gr, please do not hesitate to call or text me if you have further questions or concerns.        Brayan Mart MD  [facility]   Division of Hematology/Oncology  [facility], [address]  [place] [address]  P: [phone]  F: [phone]    Attestation:    ----Pt evalulated including face-to-face interaction in addition to chart review, reviewing treatment plan, and managing the patient’s chronic diagnoses as listed in the assessment----   Mr. Gr is a 69 year old male with PMHx of seizure disorder, sleep apnea, HTN, chronic idiopathic constipation, stage III CKD, severe obesity, secondary hyperparathyroidism, anemia, thrombocytopenia, hyperlipidemia, testicular cancer s/p EP x 4 under the care of Dr. Ovalles who is admitted s/p fall in setting of possible seizure since 01/16/2025. He was intubated early in his admission due to seziures and extubated 1/21/2025 and then again intubated in setting of respiratory distress, requiring diuresis, with SHAGUFTA on CKD, in the MICU. Imaging shows possible brain metastatic disease and lung mass as well as abnormal thyroid nodule.     Former smoker, quit 14y prior, denied ETOH, drug use. Lives at home. Does not have children. Denied family history of blood disorders or malignancy.  Denied previous workplace related exposures.  Denied previous history of blood transfusions.  Denied history of thromboses.  Denied history of  requiring anticoagulation.        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  Margins were negative.  pT3Nx. CT C/A/P with few left para-aortic and left iliac chain lymph nodes largest measuring 8.1 cm left para-aortic node, bilateral subcentimeter noncalcified pulmonary nodules measuring up to 7 mm. AFP, LDH, hCG were all elevated. 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 Completed four cycles of adjuvant therapy with Cisplatin+Etoposide, with cisplatin dose reduced 50% and Etoposide 50% due to thrombocytopenia at that time. Subsequent scans 08/2024 showed resolution of disease and negative markers,         01/26/2025: continues intubated and sedated in the ICU, discussed with ICU team yesterday, pending markers, endocrinology eval noted     01/27/2025:  AFP/BHCG normal,     01/28/2025: continues in MICU intubated, off sedation, Neurology recs noted, L cerebellar lesion not likely be cause of seizure, GOC noted per palliative discussion, pts primary Oncologist aware of current events as well     01/29/2025: , awake and alert, extubated 01/28/2025, continues in MICU     01/30/2025: now on floor, bipap, no overnight events noted    01/31/2025:  repeat CT chest w/o contrast noted with new secretions at the level of the bronchus intermedius with complete right middle/lower bilobar consolidation/collapse and new scattered bilateral upper lobe groundglass opacities, concerning for infection. Discussed with pts wife and discussed with primary Oncologist Dr. Ovalles, agree with current plan      # Brain lesion  # Seizures  - Seizures noted, pt with hx of seizures  - MRI brain now with 5.4 mm enhancing lesion in the LEFT middle cerebellar peduncle with associated edema suspicious for metastases  - MRI Lumbar negative for acute disease  - Small lesion without mass effect or edema, recommended to correlate per neurology if foci and locus are concordant with seizure activity  - Neurology recs noted, new lesion not likely to cause seizure  - It is rare, but nonetheless not impossible, for testicular cancer to be metastatic to the brain  - He will need another PET-CT, can be completed outpatient once stable if concern can proceed with biopsy at that time   - Endocrinology eval for thyroid nodule  - AFP/BHCG normal,      # Thyroid nodule  - US Thyroid 01/24/2025 with 1.6cm complex mixed solid cystic hypoechoic nodule in the lower pole of the right thyroid lobe, TIRADS 5.  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 Ovalles 12/03/2024, markers continued to be negative  - See above in regards to brain lesion  - MRI Neck shows 4.2 cm RIGHT upper lobe mass/infiltrate  - Recommend IR guided biopsy of RUL mass if PET-CT concordant/suggestive of malignancy, PET-CT as outpt and then consideration after better characterization   - Repeat CT chest w/o contrast noted with new secretions at the level of the bronchus intermedius with complete right middle/lower bilobar consolidation/collapse and new scattered bilateral upper lobe groundglass opacities, concerning for infection  - Discussed with pts wife and discussed with primary Oncologist Dr. Ovalles, agree with current plan  - Follow up as outpatient with Franki Ovalles MD     # Thrombocytopenia  - Labile  - Coags wnl, LFTs elevated  - Continue to trend  - Transfuse to maintain >10k, if actively bleeding then maintain >50k     # Acute kidney injury on CKD Stage IIIA  - Worsening    - Likely multifactorial at this point  - Nephrology consult and recs noted  - Continue to trend     # Leukocytosis, Neutrophilia  - Likely reactive, resolved  - Continue to trend     # Normocytic anemia  - Chronic, has hx of PRBC during chemo 07/2024  - Noted Anti E, Anti-K Anti-C antibodies previously  - Monitor for bleeding  - Recommend to transfuse to maintain Hb > 7       Recommendations:   - Follow up ID recs given CT chest  - Follow up pulmonary recs  - PET-CT as outpatient  - Follow up with Endocrinology as outpt given thyroid nodule   - Biopsy as outpatient if RUL lesion concerning  - Should follow up with neurosurgery and neurology as outpatient well given lesion in the brain   - Continue to monitor H/H daily   - Continue to monitor renal function and Nephrology recs    Thank you for allowing me to participate in the care Mr. Gr, please do not hesitate to call or text me if you have further questions or concerns.        Brayan Mart MD  [facility]   Division of Hematology/Oncology  [address] [place] [address], [address]  [place] [address]  P: [phone]  F: [phone]    Attestation:    ----Pt evalulated including face-to-face interaction in addition to chart review, reviewing treatment plan, and managing the patient’s chronic diagnoses as listed in the assessment----

## 2025-01-31 NOTE — PROGRESS NOTE ADULT - SUBJECTIVE AND OBJECTIVE BOX
DATE OF SERVICE: 01-31-25 @ 12:49    Patient is a 69y old  Male who presents with a chief complaint of seizure, flu, elevated trop (31 Jan 2025 10:40)      INTERVAL HISTORY: in no acute distress    REVIEW OF SYSTEMS:  CONSTITUTIONAL: No weakness  EYES/ENT: No visual changes;  No throat pain   NECK: No pain or stiffness  RESPIRATORY: No cough, wheezing; No shortness of breath  CARDIOVASCULAR: No chest pain or palpitations  GASTROINTESTINAL: No abdominal  pain. No nausea, vomiting, or hematemesis  GENITOURINARY: No dysuria, frequency or hematuria  NEUROLOGICAL: No stroke like symptoms  SKIN: No rashes    	  MEDICATIONS:        PHYSICAL EXAM:  T(C): 36.6 (01-31-25 @ 08:23), Max: 37.1 (01-30-25 @ 15:33)  HR: 95 (01-31-25 @ 10:00) (80 - 95)  BP: 148/83 (01-31-25 @ 08:23) (121/78 - 148/83)  RR: 18 (01-31-25 @ 08:23) (18 - 18)  SpO2: 96% (01-31-25 @ 10:00) (93% - 100%)  Wt(kg): --  I&O's Summary    30 Jan 2025 07:01  -  31 Jan 2025 07:00  --------------------------------------------------------  IN: 0 mL / OUT: 1250 mL / NET: -1250 mL          Appearance: In no distress	  HEENT:    PERRL, EOMI	  Cardiovascular:  S1 S2, No JVD  Respiratory: Lungs clear to auscultation	  Gastrointestinal:  Soft, Non-tender, + BS	  Vascularature:  No edema of LE  Psychiatric: Appropriate affect   Neuro: no acute focal deficits                               9.5    6.09  )-----------( 241      ( 31 Jan 2025 06:59 )             31.8     01-31    150[H]  |  114[H]  |  108[H]  ----------------------------<  145[H]  4.2   |  22  |  3.46[H]    Ca    9.9      31 Jan 2025 06:59  Phos  5.2     01-30  Mg     3.6     01-30    TPro  6.8  /  Alb  3.4  /  TBili  0.3  /  DBili  x   /  AST  50[H]  /  ALT  55[H]  /  AlkPhos  137[H]  01-30        Labs personally reviewed  < from: CT Chest No Cont (01.30.25 @ 13:37) >  IMPRESSION:  New secretions at the level of the bronchus intermedius with complete   right middle/lower bilobar consolidation/collapse.    New scattered bilateral upper lobe groundglass opacities, concerning for   infection.      ASSESSMENT/PLAN: 	      69M w/ hx of metastatic testicular cancer (s/p L orchiectomy, on etoposide/cisplatin chemo, last dose 6/2024), epilepsy (grand-mal seizures), schizophrenia, developmental delay, HTN, HLD, VAZQUEZ, stage III CKD, obesity, secondary hyperparathyroidism, anemia, thrombocytopenia, ? spinal surgery who was admitted on 1/16/25 for fall and syncope. On 1/17 patient had multiple RRTs called for grand mal seizure activity and  patient had x2 more episode and was ultimately given ativan 2mg, Vimpat load 200mg, and intubated for airway protection with MICU transfer. Extubated 1/19 - to AVAPS, post extubation with increased secretion, now better; downgraded 1/21; While on medical floors; patient developed respiratory distress; now intubated for suspected aspiration pna vs flash pulmonary edema. Extubated 1/28/25.    Problem/Plan #1: Acute Hypoxic Respiratory Failure  - Intubated for airway protection i/s/o multiple RRTs for grand mal seizure activity, Extubated 1/19 to AVAPS  - Reintubated 1/23 after hypoxic episode for suspected aspiration pna vs flash pulmonary edema.   - BNP 3949  - CXR shows mild pulm edema on 1/16; CXR 1/25  Bibasilar atelectasis. Mild interstitial edema.. Improved aeration in the right upper lobe  - TTE with preserved EF, no WMA, dilated aortic root  - Was on Bumex IV - stopped on 1/25 due to ^ creatinine 4.31 from 3.92  - CT chest 1/30 - concerning for infection, complete right middle/lower lobe consolidation/collapse; no pleural or pericardial effusion; pulm & ID following  - Check proBNP 1/31 1134 (down from 3949 on 1/23)    Problem/Plan #2: Hypertensive Urgency  - BP now stable off anti-hypertensives. Will slowly re-implement as BP recovers.    Problem/Plan #3: HLD  - c/w atorvastatin 20mg daily    Problem/Plan #4: DVT Ppx  - c/w SQ heparin        TUAN Vanessa DO [facility]  Cardiovascular Medicine  [address] [place] [address], [facility] [address]  Available through call or text on [employer]  Office: [phone]     DATE OF SERVICE: 01-31-25 @ 12:49    Patient is a 69y old  Male who presents with a chief complaint of seizure, flu, elevated trop (31 Jan 2025 10:40)      INTERVAL HISTORY: pt appears weak & fatigued    REVIEW OF SYSTEMS:  CONSTITUTIONAL: + weakness  EYES/ENT: No visual changes;  No throat pain   NECK: No pain or stiffness  RESPIRATORY: No cough, wheezing; + shortness of breath  CARDIOVASCULAR: No chest pain or palpitations  GASTROINTESTINAL: No abdominal  pain. No nausea, vomiting, or hematemesis  GENITOURINARY: No dysuria, frequency or hematuria  NEUROLOGICAL: No stroke like symptoms  SKIN: No rashes    	  MEDICATIONS:        PHYSICAL EXAM:  T(C): 36.6 (01-31-25 @ 08:23), Max: 37.1 (01-30-25 @ 15:33)  HR: 95 (01-31-25 @ 10:00) (80 - 95)  BP: 148/83 (01-31-25 @ 08:23) (121/78 - 148/83)  RR: 18 (01-31-25 @ 08:23) (18 - 18)  SpO2: 96% (01-31-25 @ 10:00) (93% - 100%)  Wt(kg): --  I&O's Summary    30 Jan 2025 07:01  -  31 Jan 2025 07:00  --------------------------------------------------------  IN: 0 mL / OUT: 1250 mL / NET: -1250 mL          Appearance: In no distress	  HEENT:    PERRL, EOMI	  Cardiovascular:  S1 S2, No JVD  Respiratory: Lungs clear to auscultation	  Gastrointestinal:  Soft, Non-tender, + BS	  Vascularature:  No edema of LE  Psychiatric: Appropriate affect   Neuro: no acute focal deficits                               9.5    6.09  )-----------( 241      ( 31 Jan 2025 06:59 )             31.8     01-31    150[H]  |  114[H]  |  108[H]  ----------------------------<  145[H]  4.2   |  22  |  3.46[H]    Ca    9.9      31 Jan 2025 06:59  Phos  5.2     01-30  Mg     3.6     01-30    TPro  6.8  /  Alb  3.4  /  TBili  0.3  /  DBili  x   /  AST  50[H]  /  ALT  55[H]  /  AlkPhos  137[H]  01-30        Labs personally reviewed  < from: CT Chest No Cont (01.30.25 @ 13:37) >  IMPRESSION:  New secretions at the level of the bronchus intermedius with complete   right middle/lower bilobar consolidation/collapse.    New scattered bilateral upper lobe groundglass opacities, concerning for   infection.      ASSESSMENT/PLAN: 	      69M w/ hx of metastatic testicular cancer (s/p L orchiectomy, on etoposide/cisplatin chemo, last dose 6/2024), epilepsy (grand-mal seizures), schizophrenia, developmental delay, HTN, HLD, VAZQUEZ, stage III CKD, obesity, secondary hyperparathyroidism, anemia, thrombocytopenia, ? spinal surgery who was admitted on 1/16/25 for fall and syncope. On 1/17 patient had multiple RRTs called for grand mal seizure activity and  patient had x2 more episode and was ultimately given ativan 2mg, Vimpat load 200mg, and intubated for airway protection with MICU transfer. Extubated 1/19 - to AVAPS, post extubation with increased secretion, now better; downgraded 1/21; While on medical floors; patient developed respiratory distress; now intubated for suspected aspiration pna vs flash pulmonary edema. Extubated 1/28/25.    Problem/Plan #1: Acute Hypoxic Respiratory Failure  - Intubated for airway protection i/s/o multiple RRTs for grand mal seizure activity, Extubated 1/19 to AVAPS  - Reintubated 1/23 after hypoxic episode for suspected aspiration pna vs flash pulmonary edema.   - BNP 3949  - CXR shows mild pulm edema on 1/16; CXR 1/25  Bibasilar atelectasis. Mild interstitial edema.. Improved aeration in the right upper lobe  - TTE with preserved EF, no WMA, dilated aortic root  - Was on Bumex IV - stopped on 1/25 due to ^ creatinine 4.31 from 3.92  - CT chest 1/30 - concerning for infection, complete right middle/lower lobe consolidation/collapse; no pleural or pericardial effusion; pulm & ID following  - Check proBNP 1/31 1134 (down from 3949 on 1/23)    Problem/Plan #2: Hypertensive Urgency  - BP now stable off anti-hypertensives. Will slowly re-implement as BP recovers.    Problem/Plan #3: HLD  - c/w atorvastatin 20mg daily    Problem/Plan #4: DVT Ppx  - c/w SQ heparin        TUAN Vanessa DO [facility]  Cardiovascular Medicine  [address] [place] [address], [place] [address]  Available through call or text on [employer]  Office: [phone]

## 2025-01-31 NOTE — PROGRESS NOTE ADULT - ASSESSMENT
70 y/o M with PMH of metastatic testicular cancer (s/p L orchiectomy, on etoposide/cisplatin chemo, last dose 6/2024), epilepsy (grand-mal seizures), schizophrenia, developmental delay, HTN, HLD, VAZQUEZ, stage III CKD, severe obesity, secondary hyperparathyroidism, anemia, thrombocytopenia who presented to ED for fall, possible seizure prior. On 1/17 patient had multiple RRTs called for grand mal seizure activity and  patient had x2 more episode and was ultimately given ativan 2mg, Vimpat load 200mg, and intubated for airway protection with MICU transfer. Extubated 1/19 - to AVAPS, post extubation with increased secretion. Downgraded to medical floor 1/21. Pt still with fevers, s/p RRT 1/23 AM for fever, hypoxia.  AVAPS - O2 sats 98% but tachypneic to 40, tachycardic, pt endorsing SOB. RRT ---> intubated for respiratory failure. also febrile and Hypertensive. CKD      1- CKD III  with SHAGUFTA   2- CHF   3- fevers  4- tachycardia  5- respiratory failure s/p extubation   6- HTN   7- hypernatremia     SHAGUFTA suspect ischemic ATN in setting of infection and hypotension   fluid status improved based on pocus by the primary team   off diuretics now     bun is slightly better  cr is improving   non oliguric   not uremic   add free water 250 cc q 4 feeding tube   hyperphosphatemia renvela 1 tab tid   d.w pt sister at bedside in detail

## 2025-01-31 NOTE — PROGRESS NOTE ADULT - SUBJECTIVE AND OBJECTIVE BOX
[facility]   [phone]  RENAL FOLLOW UP NOTE  --------------------------------------------------------------------------------  Chief Complaint:    24 hour events/subjective:    seen earlier   states feels better     PAST HISTORY  --------------------------------------------------------------------------------  No significant changes to PMH, PSH, FHx, SHx, unless otherwise noted    ALLERGIES & MEDICATIONS  --------------------------------------------------------------------------------  Allergies    penicillin (Hives)  seasonal allergies (Unknown)    Intolerances    latex (Rash)    Standing Inpatient Medications  acetylcysteine 20%  Inhalation 3 milliLiter(s) Inhalation every 6 hours  albuterol/ipratropium for Nebulization 3 milliLiter(s) Nebulizer every 6 hours  atorvastatin 20 milliGRAM(s) Oral at bedtime  bacitracin   Ointment 1 Application(s) Topical two times a day  chlorhexidine 2% Cloths 1 Application(s) Topical <User Schedule>  chlorhexidine 2% Cloths 1 Application(s) Topical <User Schedule>  escitalopram 15 milliGRAM(s) Oral daily  gabapentin Solution 150 milliGRAM(s) Oral two times a day  heparin   Injectable 7500 Unit(s) SubCutaneous every 8 hours  influenza  Vaccine (HIGH DOSE) 0.5 milliLiter(s) IntraMuscular once  lacosamide IVPB 100 milliGRAM(s) IV Intermittent every 12 hours  lamoTRIgine 100 milliGRAM(s) Oral two times a day  levETIRAcetam  IVPB 750 milliGRAM(s) IV Intermittent two times a day  lidocaine   4% Patch 1 Patch Transdermal daily  lurasidone 40 milliGRAM(s) Oral daily  pantoprazole  Injectable 40 milliGRAM(s) IV Push daily  polyethylene glycol 3350 17 Gram(s) Oral daily  senna Syrup 10 milliLiter(s) Oral at bedtime  sevelamer carbonate Powder 800 milliGRAM(s) Oral three times a day with meals    PRN Inpatient Medications  nystatin Powder 1 Application(s) Topical three times a day PRN      REVIEW OF SYSTEMS  --------------------------------------------------------------------------------    Gen: denies  fevers/chills, or HA or dizziness   CVS: denies chest pain/palpitations  Resp: denies SOB/Cough  GI: Denies N/V/Abd pain  : Denies dysuria/oliguria/hematuria        VITALS/PHYSICAL EXAM  --------------------------------------------------------------------------------  T(C): 36.7 (01-31-25 @ 15:36), Max: 36.9 (01-31-25 @ 00:29)  HR: 88 (01-31-25 @ 15:36) (80 - 95)  BP: 129/76 (01-31-25 @ 15:36) (123/78 - 148/83)  RR: 18 (01-31-25 @ 15:36) (18 - 18)  SpO2: 96% (01-31-25 @ 15:36) (93% - 97%)  Wt(kg): --        01-30-25 @ 07:01  -  01-31-25 @ 07:00  --------------------------------------------------------  IN: 0 mL / OUT: 1250 mL / NET: -1250 mL      Physical Exam:  	      Gen: comfortable appearing + NGT   	no jvd  	Pulm: decrease bs  no rales or ronchi or wheezing  	CV: tachy  S1S2; no rub  	Abd: hypoactive bs soft distended  	UE: Warm, no cyanosis  no clubbing,  no edema;   	LE: Warm, no cyanosis  no clubbing, no edema  	Neuro:  alert and oriented       LABS/STUDIES  --------------------------------------------------------------------------------              9.5    6.09  >-----------<  241      [01-31-25 @ 06:59]              31.8     150  |  114  |  108  ----------------------------<  145      [01-31-25 @ 06:59]  4.2   |  22  |  3.46        Ca     9.9     [01-31-25 @ 06:59]      Mg     3.6     [01-30-25 @ 07:12]      Phos  5.2     [01-30-25 @ 07:12]    TPro  6.8  /  Alb  3.4  /  TBili  0.3  /  DBili  x   /  AST  50  /  ALT  55  /  AlkPhos  137  [01-30-25 @ 07:12]    PT/INR: PT 10.0 , INR 0.87       [01-30-25 @ 07:17]  PTT: 26.7       [01-30-25 @ 07:17]      Creatinine Trend:  SCr 3.46 [01-31 @ 06:59]  SCr 4.25 [01-30 @ 07:12]  SCr 4.71 [01-29 @ 11:56]  SCr 5.08 [01-29 @ 00:39]  SCr 4.86 [01-28 @ 15:25]

## 2025-01-31 NOTE — PROGRESS NOTE ADULT - PROBLEM SELECTOR PLAN 1
-S/p intubation in MICU in the setting of grand mal seizures, pneumonia, Flu  -Completed course of ABX, tamiflu for Flu. Extubated to AVAPS 1/19  -S/p RRT 1/23 AM for fevers, hypoxia requiring AVAPS. Likely aspiration event +/- flash pulmonary edema  -S/p 2nd RRT 1/23 PM for increased WOB on AVAPS, intubated and tx to MICU   -Extubated to AVAPS 1/28, now using qHS   -Continue AVAPS  ePAP 5 Rate 14 Max pressure 35 Min Pressure 15 fio2 40% qHS and PRN for increased WOB for now. Pt has CPAP at home for VAZQUEZ, will need to monitor off AVAPs prior to discharge.  -Keep sats >90% with O2 PRN. Wean o2 as tolerated.   -CXR 1/29 with low lung volumes  -Repeat CT chest with bronchial secretions, RML/RLL atelectasis +/- underlying infiltrate, scattered b/l upper lobe GGO. ABX completed, ID f/u.   -Continue bronchodilators. Start Mucomyst 20% 3ml q6h x3 days (give with Duoneb)  -Chest PT   -ABG with no Co2 retention.

## 2025-01-31 NOTE — PROGRESS NOTE ADULT - SUBJECTIVE AND OBJECTIVE BOX
SUBJECTIVE / OVERNIGHT EVENTS:          --------------------------------------------------------------------------------------------  LABS:                        9.6    7.16  )-----------( 237      ( 30 Jan 2025 07:15 )             31.2     01-30    147[H]  |  109[H]  |  117[H]  ----------------------------<  135[H]  3.9   |  22  |  4.25[H]    Ca    9.6      30 Jan 2025 07:12  Phos  5.2     01-30  Mg     3.6     01-30    TPro  6.8  /  Alb  3.4  /  TBili  0.3  /  DBili  x   /  AST  50[H]  /  ALT  55[H]  /  AlkPhos  137[H]  01-30    PT/INR - ( 30 Jan 2025 07:17 )   PT: 10.0 sec;   INR: 0.87 ratio         PTT - ( 30 Jan 2025 07:17 )  PTT:26.7 sec  CAPILLARY BLOOD GLUCOSE      POCT Blood Glucose.: 155 mg/dL (31 Jan 2025 05:45)  POCT Blood Glucose.: 115 mg/dL (30 Jan 2025 23:36)  POCT Blood Glucose.: 130 mg/dL (30 Jan 2025 18:13)  POCT Blood Glucose.: 119 mg/dL (30 Jan 2025 12:02)        Urinalysis Basic - ( 30 Jan 2025 07:12 )    Color: x / Appearance: x / SG: x / pH: x  Gluc: 135 mg/dL / Ketone: x  / Bili: x / Urobili: x   Blood: x / Protein: x / Nitrite: x   Leuk Esterase: x / RBC: x / WBC x   Sq Epi: x / Non Sq Epi: x / Bacteria: x        RADIOLOGY & ADDITIONAL TESTS:    Imaging Personally Reviewed:  [x] YES  [ ] NO    Consultant(s) Notes Reviewed:  [x] YES  [ ] NO    MEDICATIONS  (STANDING):  albuterol/ipratropium for Nebulization 3 milliLiter(s) Nebulizer every 6 hours  atorvastatin 20 milliGRAM(s) Oral at bedtime  bacitracin   Ointment 1 Application(s) Topical two times a day  chlorhexidine 2% Cloths 1 Application(s) Topical <User Schedule>  chlorhexidine 2% Cloths 1 Application(s) Topical <User Schedule>  escitalopram 15 milliGRAM(s) Oral daily  gabapentin Solution 150 milliGRAM(s) Oral two times a day  heparin   Injectable 7500 Unit(s) SubCutaneous every 8 hours  influenza  Vaccine (HIGH DOSE) 0.5 milliLiter(s) IntraMuscular once  lacosamide IVPB 100 milliGRAM(s) IV Intermittent every 12 hours  lamoTRIgine 100 milliGRAM(s) Oral two times a day  levETIRAcetam  IVPB 750 milliGRAM(s) IV Intermittent two times a day  lidocaine   4% Patch 1 Patch Transdermal daily  lurasidone 40 milliGRAM(s) Oral daily  pantoprazole  Injectable 40 milliGRAM(s) IV Push daily  polyethylene glycol 3350 17 Gram(s) Oral daily  senna Syrup 10 milliLiter(s) Oral at bedtime  sevelamer carbonate Powder 800 milliGRAM(s) Oral three times a day with meals    MEDICATIONS  (PRN):  nystatin Powder 1 Application(s) Topical three times a day PRN fungal infection you may see and want to treat      Care Discussed with Consultants/Other Providers [x] YES  [ ] NO    Vital Signs Last 24 Hrs  T(C): 36.9 (31 Jan 2025 00:29), Max: 37.1 (30 Jan 2025 15:33)  T(F): 98.5 (31 Jan 2025 00:29), Max: 98.7 (30 Jan 2025 15:33)  HR: 85 (31 Jan 2025 05:52) (80 - 86)  BP: 123/78 (31 Jan 2025 00:29) (121/78 - 136/79)  BP(mean): --  RR: 18 (31 Jan 2025 00:29) (18 - 18)  SpO2: 97% (31 Jan 2025 05:52) (96% - 100%)    Parameters below as of 31 Jan 2025 00:29  Patient On (Oxygen Delivery Method): nasal cannula  O2 Flow (L/min): 6    I&O's Summary    29 Jan 2025 07:01  -  30 Jan 2025 07:00  --------------------------------------------------------  IN: 780 mL / OUT: 1075 mL / NET: -295 mL    30 Jan 2025 07:01  -  31 Jan 2025 06:57  --------------------------------------------------------  IN: 0 mL / OUT: 1250 mL / NET: -1250 mL           SUBJECTIVE / OVERNIGHT EVENTS:    no overnight events noted  resting comfortably in bed     --------------------------------------------------------------------------------------------  LABS:                        9.6    7.16  )-----------( 237      ( 30 Jan 2025 07:15 )             31.2     01-30    147[H]  |  109[H]  |  117[H]  ----------------------------<  135[H]  3.9   |  22  |  4.25[H]    Ca    9.6      30 Jan 2025 07:12  Phos  5.2     01-30  Mg     3.6     01-30    TPro  6.8  /  Alb  3.4  /  TBili  0.3  /  DBili  x   /  AST  50[H]  /  ALT  55[H]  /  AlkPhos  137[H]  01-30    PT/INR - ( 30 Jan 2025 07:17 )   PT: 10.0 sec;   INR: 0.87 ratio         PTT - ( 30 Jan 2025 07:17 )  PTT:26.7 sec  CAPILLARY BLOOD GLUCOSE      POCT Blood Glucose.: 155 mg/dL (31 Jan 2025 05:45)  POCT Blood Glucose.: 115 mg/dL (30 Jan 2025 23:36)  POCT Blood Glucose.: 130 mg/dL (30 Jan 2025 18:13)  POCT Blood Glucose.: 119 mg/dL (30 Jan 2025 12:02)        Urinalysis Basic - ( 30 Jan 2025 07:12 )    Color: x / Appearance: x / SG: x / pH: x  Gluc: 135 mg/dL / Ketone: x  / Bili: x / Urobili: x   Blood: x / Protein: x / Nitrite: x   Leuk Esterase: x / RBC: x / WBC x   Sq Epi: x / Non Sq Epi: x / Bacteria: x        RADIOLOGY & ADDITIONAL TESTS:    Imaging Personally Reviewed:  [x] YES  [ ] NO    Consultant(s) Notes Reviewed:  [x] YES  [ ] NO    MEDICATIONS  (STANDING):  albuterol/ipratropium for Nebulization 3 milliLiter(s) Nebulizer every 6 hours  atorvastatin 20 milliGRAM(s) Oral at bedtime  bacitracin   Ointment 1 Application(s) Topical two times a day  chlorhexidine 2% Cloths 1 Application(s) Topical <User Schedule>  chlorhexidine 2% Cloths 1 Application(s) Topical <User Schedule>  escitalopram 15 milliGRAM(s) Oral daily  gabapentin Solution 150 milliGRAM(s) Oral two times a day  heparin   Injectable 7500 Unit(s) SubCutaneous every 8 hours  influenza  Vaccine (HIGH DOSE) 0.5 milliLiter(s) IntraMuscular once  lacosamide IVPB 100 milliGRAM(s) IV Intermittent every 12 hours  lamoTRIgine 100 milliGRAM(s) Oral two times a day  levETIRAcetam  IVPB 750 milliGRAM(s) IV Intermittent two times a day  lidocaine   4% Patch 1 Patch Transdermal daily  lurasidone 40 milliGRAM(s) Oral daily  pantoprazole  Injectable 40 milliGRAM(s) IV Push daily  polyethylene glycol 3350 17 Gram(s) Oral daily  senna Syrup 10 milliLiter(s) Oral at bedtime  sevelamer carbonate Powder 800 milliGRAM(s) Oral three times a day with meals    MEDICATIONS  (PRN):  nystatin Powder 1 Application(s) Topical three times a day PRN fungal infection you may see and want to treat      Care Discussed with Consultants/Other Providers [x] YES  [ ] NO    Vital Signs Last 24 Hrs  T(C): 36.9 (31 Jan 2025 00:29), Max: 37.1 (30 Jan 2025 15:33)  T(F): 98.5 (31 Jan 2025 00:29), Max: 98.7 (30 Jan 2025 15:33)  HR: 85 (31 Jan 2025 05:52) (80 - 86)  BP: 123/78 (31 Jan 2025 00:29) (121/78 - 136/79)  BP(mean): --  RR: 18 (31 Jan 2025 00:29) (18 - 18)  SpO2: 97% (31 Jan 2025 05:52) (96% - 100%)    Parameters below as of 31 Jan 2025 00:29  Patient On (Oxygen Delivery Method): nasal cannula  O2 Flow (L/min): 6    I&O's Summary    29 Jan 2025 07:01  -  30 Jan 2025 07:00  --------------------------------------------------------  IN: 780 mL / OUT: 1075 mL / NET: -295 mL    30 Jan 2025 07:01  -  31 Jan 2025 06:57  --------------------------------------------------------  IN: 0 mL / OUT: 1250 mL / NET: -1250 mL    PHYSICAL EXAM:  GENERAL: NAD,   HEAD:  Atraumatic, Normocephalic,  EYES: EOMI, PERRLA, conjunctiva and sclera clear  NECK: Supple, No JVD  CHEST/LUNG: Diminished bilaterally; No wheeze  HEART: Regular rate and rhythm; No murmurs, rubs, or gallops  ABDOMEN: Soft, Nontender, Nondistended; Bowel sounds present  NEURO: AAOx3, no focal weakness, 5/5 b/l extremity strength, b/l knee no arthritis, no effusion   EXTREMITIES:  2+ Peripheral Pulses, No clubbing, cyanosis, or edema  SKIN: No rashes or lesions

## 2025-02-01 LAB
ANION GAP SERPL CALC-SCNC: 15 MMOL/L — SIGNIFICANT CHANGE UP (ref 5–17)
BUN SERPL-MCNC: 101 MG/DL — HIGH (ref 7–23)
CALCIUM SERPL-MCNC: 10 MG/DL — SIGNIFICANT CHANGE UP (ref 8.4–10.5)
CHLORIDE SERPL-SCNC: 116 MMOL/L — HIGH (ref 96–108)
CO2 SERPL-SCNC: 20 MMOL/L — LOW (ref 22–31)
CREAT SERPL-MCNC: 2.96 MG/DL — HIGH (ref 0.5–1.3)
EGFR: 22 ML/MIN/1.73M2 — LOW
GLUCOSE SERPL-MCNC: 104 MG/DL — HIGH (ref 70–99)
HCT VFR BLD CALC: 32.7 % — LOW (ref 39–50)
HERPES SIMPLEX VIRUS 1/2 SURVEILLANCE PCR SOURCE: SIGNIFICANT CHANGE UP
HGB BLD-MCNC: 10.1 G/DL — LOW (ref 13–17)
HSV1+2 DNA SPEC QL NAA+PROBE: ABNORMAL
MCHC RBC-ENTMCNC: 30.9 G/DL — LOW (ref 32–36)
MCHC RBC-ENTMCNC: 32.1 PG — SIGNIFICANT CHANGE UP (ref 27–34)
MCV RBC AUTO: 103.8 FL — HIGH (ref 80–100)
NRBC # BLD: 0 /100 WBCS — SIGNIFICANT CHANGE UP (ref 0–0)
NRBC BLD-RTO: 0 /100 WBCS — SIGNIFICANT CHANGE UP (ref 0–0)
PLATELET # BLD AUTO: 254 K/UL — SIGNIFICANT CHANGE UP (ref 150–400)
POTASSIUM SERPL-MCNC: 4.5 MMOL/L — SIGNIFICANT CHANGE UP (ref 3.5–5.3)
POTASSIUM SERPL-SCNC: 4.5 MMOL/L — SIGNIFICANT CHANGE UP (ref 3.5–5.3)
RBC # BLD: 3.15 M/UL — LOW (ref 4.2–5.8)
RBC # FLD: 13 % — SIGNIFICANT CHANGE UP (ref 10.3–14.5)
SODIUM SERPL-SCNC: 151 MMOL/L — HIGH (ref 135–145)
WBC # BLD: 7.5 K/UL — SIGNIFICANT CHANGE UP (ref 3.8–10.5)
WBC # FLD AUTO: 7.5 K/UL — SIGNIFICANT CHANGE UP (ref 3.8–10.5)

## 2025-02-01 PROCEDURE — 99232 SBSQ HOSP IP/OBS MODERATE 35: CPT

## 2025-02-01 RX ORDER — SEVELAMER CARBONATE 800 MG/1
800 TABLET, FILM COATED ORAL THREE TIMES A DAY
Refills: 0 | Status: DISCONTINUED | OUTPATIENT
Start: 2025-02-01 | End: 2025-02-06

## 2025-02-01 RX ORDER — ACETAMINOPHEN 160 MG/5ML
1000 SUSPENSION ORAL ONCE
Refills: 0 | Status: COMPLETED | OUTPATIENT
Start: 2025-02-01 | End: 2025-02-03

## 2025-02-01 RX ADMIN — Medication 3 MILLILITER(S): at 23:10

## 2025-02-01 RX ADMIN — IPRATROPIUM BROMIDE AND ALBUTEROL SULFATE 3 MILLILITER(S): .5; 2.5 SOLUTION RESPIRATORY (INHALATION) at 17:06

## 2025-02-01 RX ADMIN — Medication 3 MILLILITER(S): at 00:05

## 2025-02-01 RX ADMIN — IPRATROPIUM BROMIDE AND ALBUTEROL SULFATE 3 MILLILITER(S): .5; 2.5 SOLUTION RESPIRATORY (INHALATION) at 23:10

## 2025-02-01 RX ADMIN — LIDOCAINE HYDROCHLORIDE 1 PATCH: 30 CREAM TOPICAL at 06:56

## 2025-02-01 RX ADMIN — LACOSAMIDE 120 MILLIGRAM(S): 200 TABLET, FILM COATED ORAL at 06:57

## 2025-02-01 RX ADMIN — SEVELAMER CARBONATE 800 MILLIGRAM(S): 800 TABLET, FILM COATED ORAL at 22:36

## 2025-02-01 RX ADMIN — Medication 3 MILLILITER(S): at 17:06

## 2025-02-01 RX ADMIN — LAMOTRIGINE 100 MILLIGRAM(S): 100 TABLET ORAL at 17:06

## 2025-02-01 RX ADMIN — PANTOPRAZOLE 40 MILLIGRAM(S): 20 TABLET, DELAYED RELEASE ORAL at 12:28

## 2025-02-01 RX ADMIN — LEVETIRACETAM 400 MILLIGRAM(S): 750 TABLET, FILM COATED ORAL at 06:58

## 2025-02-01 RX ADMIN — IPRATROPIUM BROMIDE AND ALBUTEROL SULFATE 3 MILLILITER(S): .5; 2.5 SOLUTION RESPIRATORY (INHALATION) at 00:05

## 2025-02-01 RX ADMIN — ANTISEPTIC SURGICAL SCRUB 1 APPLICATION(S): 0.04 SOLUTION TOPICAL at 09:52

## 2025-02-01 RX ADMIN — SEVELAMER CARBONATE 800 MILLIGRAM(S): 800 TABLET, FILM COATED ORAL at 12:28

## 2025-02-01 RX ADMIN — LAMOTRIGINE 100 MILLIGRAM(S): 100 TABLET ORAL at 06:57

## 2025-02-01 RX ADMIN — IPRATROPIUM BROMIDE AND ALBUTEROL SULFATE 3 MILLILITER(S): .5; 2.5 SOLUTION RESPIRATORY (INHALATION) at 06:56

## 2025-02-01 RX ADMIN — LURASIDONE HYDROCHLORIDE 40 MILLIGRAM(S): 80 TABLET, FILM COATED ORAL at 12:28

## 2025-02-01 RX ADMIN — Medication 7500 UNIT(S): at 14:03

## 2025-02-01 RX ADMIN — LACOSAMIDE 120 MILLIGRAM(S): 200 TABLET, FILM COATED ORAL at 17:53

## 2025-02-01 RX ADMIN — Medication 1 APPLICATION(S): at 17:06

## 2025-02-01 RX ADMIN — ESCITALOPRAM 15 MILLIGRAM(S): 10 TABLET, FILM COATED ORAL at 12:28

## 2025-02-01 RX ADMIN — IPRATROPIUM BROMIDE AND ALBUTEROL SULFATE 3 MILLILITER(S): .5; 2.5 SOLUTION RESPIRATORY (INHALATION) at 12:29

## 2025-02-01 RX ADMIN — Medication 3 MILLILITER(S): at 06:57

## 2025-02-01 RX ADMIN — GABAPENTIN 150 MILLIGRAM(S): 800 TABLET ORAL at 06:57

## 2025-02-01 RX ADMIN — Medication 1 APPLICATION(S): at 06:56

## 2025-02-01 RX ADMIN — ATORVASTATIN CALCIUM 20 MILLIGRAM(S): 80 TABLET, FILM COATED ORAL at 22:36

## 2025-02-01 RX ADMIN — Medication 3 MILLILITER(S): at 12:29

## 2025-02-01 RX ADMIN — GABAPENTIN 150 MILLIGRAM(S): 800 TABLET ORAL at 17:05

## 2025-02-01 RX ADMIN — Medication 7500 UNIT(S): at 22:36

## 2025-02-01 RX ADMIN — Medication 7500 UNIT(S): at 06:58

## 2025-02-01 RX ADMIN — LEVETIRACETAM 400 MILLIGRAM(S): 750 TABLET, FILM COATED ORAL at 17:07

## 2025-02-01 NOTE — PROGRESS NOTE ADULT - ASSESSMENT
68 y/o M with PMH of metastatic testicular cancer (s/p L orchiectomy, on etoposide/cisplatin chemo, last dose 6/2024), epilepsy (grand-mal seizures), schizophrenia, developmental delay, HTN, HLD, VAZQUEZ, stage III CKD, severe obesity, secondary hyperparathyroidism, anemia, thrombocytopenia who presented to ED for fall, possible seizure prior. On 1/17 patient had multiple RRTs called for grand mal seizure activity and  patient had x2 more episode and was ultimately given ativan 2mg, Vimpat load 200mg, and intubated for airway protection with MICU transfer. Extubated 1/19 - to AVAPS, post extubation with increased secretion. Downgraded to medical floor 1/21. Pt still with fevers, s/p RRT 1/23 AM for fever, hypoxia.  AVAPS - O2 sats 98% but tachypneic to 40, tachycardic, pt endorsing SOB. RRT ---> intubated for respiratory failure. also febrile and Hypertensive. CKD      1- CKD III  with SHAGUFTA   2- CHF   3- fevers  4- tachycardia  5- respiratory failure s/p extubation   6- HTN   7- hypernatremia     SHAGUFTA suspect ischemic ATN in setting of infection and hypotension   fluid status improved based on pocus by the primary team   off diuretics now     bun is slightly better  cr is improving   non oliguric   not uremic   increase po thickened liquid intake if sodium does not improve will add d5W   hyperphosphatemia renvela 1 tab tid   d.w pt sister at bedside in detail

## 2025-02-01 NOTE — PROGRESS NOTE ADULT - ASSESSMENT
Mr. Gr is a 69 year old male with PMHx of seizure disorder, sleep apnea, HTN, chronic idiopathic constipation, stage III CKD, severe obesity, secondary hyperparathyroidism, anemia, thrombocytopenia, hyperlipidemia, testicular cancer s/p EP x 4 under the care of Dr. Ovalles who is admitted s/p fall in setting of possible seizure since 01/16/2025. He was intubated early in his admission due to seziures and extubated 1/21/2025 and then again intubated in setting of respiratory distress, requiring diuresis, with SHAGUFTA on CKD, in the MICU. Imaging shows possible brain metastatic disease and lung mass as well as abnormal thyroid nodule.     Former smoker, quit 14y prior, denied ETOH, drug use. Lives at home. Does not have children. Denied family history of blood disorders or malignancy.  Denied previous workplace related exposures.  Denied previous history of blood transfusions.  Denied history of thromboses.  Denied history of  requiring anticoagulation.        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  Margins were negative.  pT3Nx. CT C/A/P with few left para-aortic and left iliac chain lymph nodes largest measuring 8.1 cm left para-aortic node, bilateral subcentimeter noncalcified pulmonary nodules measuring up to 7 mm. AFP, LDH, hCG were all elevated. 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 Completed four cycles of adjuvant therapy with Cisplatin+Etoposide, with cisplatin dose reduced 50% and Etoposide 50% due to thrombocytopenia at that time. Subsequent scans 08/2024 showed resolution of disease and negative markers,         01/26/2025: continues intubated and sedated in the ICU, discussed with ICU team yesterday, pending markers, endocrinology eval noted     01/27/2025:  AFP/BHCG normal,     01/28/2025: continues in MICU intubated, off sedation, Neurology recs noted, L cerebellar lesion not likely be cause of seizure, GOC noted per palliative discussion, pts primary Oncologist aware of current events as well     01/29/2025: , awake and alert, extubated 01/28/2025, continues in MICU     01/30/2025: now on floor, bipap, no overnight events noted    01/31/2025:  repeat CT chest w/o contrast noted with new secretions at the level of the bronchus intermedius with complete right middle/lower bilobar consolidation/collapse and new scattered bilateral upper lobe groundglass opacities, concerning for infection. Discussed with pts wife and discussed with primary Oncologist Dr. Ovalles, agree with current plan      # Brain lesion  # Seizures  - Seizures noted, pt with hx of seizures  - MRI brain now with 5.4 mm enhancing lesion in the LEFT middle cerebellar peduncle with associated edema suspicious for metastases  - MRI Lumbar negative for acute disease  - Small lesion without mass effect or edema, recommended to correlate per neurology if foci and locus are concordant with seizure activity  - Neurology recs noted, new lesion not likely to cause seizure  - It is rare, but nonetheless not impossible, for testicular cancer to be metastatic to the brain  - He will need another PET-CT, can be completed outpatient once stable if concern can proceed with biopsy at that time   - Endocrinology eval for thyroid nodule  - AFP/BHCG normal,      # Thyroid nodule  - US Thyroid 01/24/2025 with 1.6cm complex mixed solid cystic hypoechoic nodule in the lower pole of the right thyroid lobe, TIRADS 5.  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 Ovalles 12/03/2024, markers continued to be negative  - See above in regards to brain lesion  - MRI Neck shows 4.2 cm RIGHT upper lobe mass/infiltrate  - Recommend IR guided biopsy of RUL mass if PET-CT concordant/suggestive of malignancy, PET-CT as outpt and then consideration after better characterization   - Repeat CT chest w/o contrast noted with new secretions at the level of the bronchus intermedius with complete right middle/lower bilobar consolidation/collapse and new scattered bilateral upper lobe groundglass opacities, concerning for infection  - Discussed with pts wife and discussed with primary Oncologist Dr. Ovalles, agree with current plan  - Follow up as outpatient with Franki Ovalles MD     # Thrombocytopenia  - Labile  - Coags wnl, LFTs elevated  - Continue to trend  - Transfuse to maintain >10k, if actively bleeding then maintain >50k     # Acute kidney injury on CKD Stage IIIA  - Worsening    - Likely multifactorial at this point  - Nephrology consult and recs noted  - Continue to trend     # Leukocytosis, Neutrophilia  - Likely reactive, resolved  - Continue to trend     # Normocytic anemia  - Chronic, has hx of PRBC during chemo 07/2024  - Noted Anti E, Anti-K Anti-C antibodies previously  - Monitor for bleeding  - Recommend to transfuse to maintain Hb > 7       Recommendations:   - Follow up ID recs given CT chest  - Follow up pulmonary recs  - PET-CT as outpatient  - Follow up with Endocrinology as outpt given thyroid nodule   - Biopsy as outpatient if RUL lesion concerning  - Should follow up with neurosurgery and neurology as outpatient well given lesion in the brain   - Continue to monitor H/H daily   - Continue to monitor renal function and Nephrology recs    Thank you for allowing me to participate in the care Mr. Gr, please do not hesitate to call or text me if you have further questions or concerns.        Brayan Mart MD  [facility]   Division of Hematology/Oncology  [address] [place] [address], [address]  [place] [address]  P: [phone]  F: [phone]    Attestation:    ----Pt evalulated including face-to-face interaction in addition to chart review, reviewing treatment plan, and managing the patient’s chronic diagnoses as listed in the assessment----   Mr. Gr is a 69 year old male with PMHx of seizure disorder, sleep apnea, HTN, chronic idiopathic constipation, stage III CKD, severe obesity, secondary hyperparathyroidism, anemia, thrombocytopenia, hyperlipidemia, testicular cancer s/p EP x 4 under the care of Dr. Ovalles who is admitted s/p fall in setting of possible seizure since 01/16/2025. He was intubated early in his admission due to seziures and extubated 1/21/2025 and then again intubated in setting of respiratory distress, requiring diuresis, with SHAGUFTA on CKD, in the MICU. Imaging shows possible brain metastatic disease and lung mass as well as abnormal thyroid nodule.     Former smoker, quit 14y prior, denied ETOH, drug use. Lives at home. Does not have children. Denied family history of blood disorders or malignancy.  Denied previous workplace related exposures.  Denied previous history of blood transfusions.  Denied history of thromboses.  Denied history of  requiring anticoagulation.        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  Margins were negative.  pT3Nx. CT C/A/P with few left para-aortic and left iliac chain lymph nodes largest measuring 8.1 cm left para-aortic node, bilateral subcentimeter noncalcified pulmonary nodules measuring up to 7 mm. AFP, LDH, hCG were all elevated. 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 Completed four cycles of adjuvant therapy with Cisplatin+Etoposide, with cisplatin dose reduced 50% and Etoposide 50% due to thrombocytopenia at that time. Subsequent scans 08/2024 showed resolution of disease and negative markers,         01/26/2025: continues intubated and sedated in the ICU, discussed with ICU team yesterday, pending markers, endocrinology eval noted     01/27/2025:  AFP/BHCG normal,     01/28/2025: continues in MICU intubated, off sedation, Neurology recs noted, L cerebellar lesion not likely be cause of seizure, GOC noted per palliative discussion, pts primary Oncologist aware of current events as well     01/29/2025: , awake and alert, extubated 01/28/2025, continues in MICU     01/30/2025: now on floor, bipap, no overnight events noted    01/31/2025:  repeat CT chest w/o contrast noted with new secretions at the level of the bronchus intermedius with complete right middle/lower bilobar consolidation/collapse and new scattered bilateral upper lobe groundglass opacities, concerning for infection. Discussed with pts wife and discussed with primary Oncologist Dr. Ovalles, agree with current plan    02/01/2025:  no overnight events noted, recapped hospitalization, aware of outpatient plan once he is stable, no signs of bleeding       # Brain lesion  # Seizures  - Seizures noted, pt with hx of seizures  - MRI brain now with 5.4 mm enhancing lesion in the LEFT middle cerebellar peduncle with associated edema suspicious for metastases  - MRI Lumbar negative for acute disease  - Small lesion without mass effect or edema, recommended to correlate per neurology if foci and locus are concordant with seizure activity  - Neurology recs noted, new lesion not likely to cause seizure  - It is rare, but nonetheless not impossible, for testicular cancer to be metastatic to the brain  - He will need another PET-CT, can be completed outpatient once stable if concern can proceed with biopsy at that time   - Endocrinology eval for thyroid nodule  - AFP/BHCG normal,      # Thyroid nodule  - US Thyroid 01/24/2025 with 1.6cm complex mixed solid cystic hypoechoic nodule in the lower pole of the right thyroid lobe, TIRADS 5.  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 Ovalles 12/03/2024, markers continued to be negative  - See above in regards to brain lesion  - MRI Neck shows 4.2 cm RIGHT upper lobe mass/infiltrate  - Recommend IR guided biopsy of RUL mass if PET-CT concordant/suggestive of malignancy, PET-CT as outpt and then consideration after better characterization   - Repeat CT chest w/o contrast noted with new secretions at the level of the bronchus intermedius with complete right middle/lower bilobar consolidation/collapse and new scattered bilateral upper lobe groundglass opacities, concerning for infection  - Discussed with pts wife and discussed with primary Oncologist Dr. Ovalles, agree with current plan  - Follow up as outpatient with Franki Ovalles MD     # Thrombocytopenia  - Labile  - Coags wnl, LFTs elevated  - Continue to trend  - Transfuse to maintain >10k, if actively bleeding then maintain >50k     # Acute kidney injury on CKD Stage IIIA  - Worsening    - Likely multifactorial at this point  - Nephrology consult and recs noted  - Continue to trend     # Leukocytosis, Neutrophilia  - Likely reactive, resolved  - Continue to trend     # Normocytic anemia  - Chronic, has hx of PRBC during chemo 07/2024  - Noted Anti E, Anti-K Anti-C antibodies previously  - Monitor for bleeding  - Recommend to transfuse to maintain Hb > 7       Recommendations:   - Follow ID recs   - Follow pulmonary recs  - PET-CT as outpatient  - Follow up with Endocrinology as outpt given thyroid nodule   - Biopsy as outpatient if RUL lesion concerning  - Should follow up with neurosurgery and neurology as outpatient well given lesion in the brain   - Continue to monitor H/H daily   - Continue to monitor renal function and Nephrology recs    Thank you for allowing me to participate in the care Mr. Gr, please do not hesitate to call or text me if you have further questions or concerns.        Brayan Mart MD  [facility] [place]   Division of Hematology/Oncology  [facility], [address]  [place] [address]  P: [phone]  F: [phone]    Attestation:    ----Pt evalulated including face-to-face interaction in addition to chart review, reviewing treatment plan, and managing the patient’s chronic diagnoses as listed in the assessment----

## 2025-02-01 NOTE — PROGRESS NOTE ADULT - SUBJECTIVE AND OBJECTIVE BOX
SUBJECTIVE / OVERNIGHT EVENTS:      NAD    --------------------------------------------------------------------------------------------  LABS:                        10.1   7.50  )-----------( 254      ( 01 Feb 2025 06:51 )             32.7     02-01    151[H]  |  116[H]  |  101[H]  ----------------------------<  104[H]  4.5   |  20[L]  |  2.96[H]    Ca    10.0      01 Feb 2025 06:51        CAPILLARY BLOOD GLUCOSE      POCT Blood Glucose.: 123 mg/dL (31 Jan 2025 18:03)  POCT Blood Glucose.: 185 mg/dL (31 Jan 2025 12:20)        Urinalysis Basic - ( 01 Feb 2025 06:51 )    Color: x / Appearance: x / SG: x / pH: x  Gluc: 104 mg/dL / Ketone: x  / Bili: x / Urobili: x   Blood: x / Protein: x / Nitrite: x   Leuk Esterase: x / RBC: x / WBC x   Sq Epi: x / Non Sq Epi: x / Bacteria: x        RADIOLOGY & ADDITIONAL TESTS:    Imaging Personally Reviewed:  [x] YES  [ ] NO    Consultant(s) Notes Reviewed:  [x] YES  [ ] NO    MEDICATIONS  (STANDING):  acetylcysteine 20%  Inhalation 3 milliLiter(s) Inhalation every 6 hours  albuterol/ipratropium for Nebulization 3 milliLiter(s) Nebulizer every 6 hours  atorvastatin 20 milliGRAM(s) Oral at bedtime  bacitracin   Ointment 1 Application(s) Topical two times a day  chlorhexidine 2% Cloths 1 Application(s) Topical <User Schedule>  chlorhexidine 2% Cloths 1 Application(s) Topical <User Schedule>  escitalopram 15 milliGRAM(s) Oral daily  gabapentin Solution 150 milliGRAM(s) Oral two times a day  heparin   Injectable 7500 Unit(s) SubCutaneous every 8 hours  influenza  Vaccine (HIGH DOSE) 0.5 milliLiter(s) IntraMuscular once  lacosamide IVPB 100 milliGRAM(s) IV Intermittent every 12 hours  lamoTRIgine 100 milliGRAM(s) Oral two times a day  levETIRAcetam  IVPB 750 milliGRAM(s) IV Intermittent two times a day  lidocaine   4% Patch 1 Patch Transdermal daily  lurasidone 40 milliGRAM(s) Oral daily  pantoprazole  Injectable 40 milliGRAM(s) IV Push daily  polyethylene glycol 3350 17 Gram(s) Oral daily  senna Syrup 10 milliLiter(s) Oral at bedtime  sevelamer carbonate 800 milliGRAM(s) Oral three times a day    MEDICATIONS  (PRN):  nystatin Powder 1 Application(s) Topical three times a day PRN fungal infection you may see and want to treat      Care Discussed with Consultants/Other Providers [x] YES  [ ] NO    Vital Signs Last 24 Hrs  T(C): 36.9 (01 Feb 2025 08:08), Max: 36.9 (01 Feb 2025 08:08)  T(F): 98.4 (01 Feb 2025 08:08), Max: 98.4 (01 Feb 2025 08:08)  HR: 88 (01 Feb 2025 08:55) (80 - 95)  BP: 156/94 (01 Feb 2025 08:08) (129/76 - 156/94)  BP(mean): --  RR: 18 (01 Feb 2025 08:08) (18 - 18)  SpO2: 97% (01 Feb 2025 08:55) (96% - 98%)    Parameters below as of 01 Feb 2025 08:08  Patient On (Oxygen Delivery Method): nasal cannula  O2 Flow (L/min): 4    I&O's Summary    31 Jan 2025 07:01  -  01 Feb 2025 07:00  --------------------------------------------------------  IN: 0 mL / OUT: 1200 mL / NET: -1200 mL      PHYSICAL EXAM:  GENERAL: NAD,   HEAD:  Atraumatic, Normocephalic,  EYES: EOMI, PERRLA, conjunctiva and sclera clear  NECK: Supple, No JVD  CHEST/LUNG: Diminished bilaterally; No wheeze  HEART: Regular rate and rhythm; No murmurs, rubs, or gallops  ABDOMEN: Soft, Nontender, Nondistended; Bowel sounds present  NEURO: AAOx3, no focal weakness, 5/5 b/l extremity strength, b/l knee no arthritis, no effusion   EXTREMITIES:  2+ Peripheral Pulses, No clubbing, cyanosis, or edema  SKIN: No rashes or lesions

## 2025-02-01 NOTE — PROGRESS NOTE ADULT - ASSESSMENT
69M w/ hx of metastatic testicular cancer (s/p L orchiectomy, on etoposide/cisplatin chemo), epilepsy (grand-mal seizures), schizophrenia, developmental delay, HTN, HLD, VAZQUEZ, stage III CKD, severe obesity, secondary hyperparathyroidism, anemia, thrombocytopenia who presented to ED for fall, possible seizure prior.    Plan:    # Hypoxia: Sepsis/sirs, AHRF possible flash pulmonary edema iso uncontrolled HTN, c/f aspiration pneumonia : Improving  - S/p RRT 1/23 am and 1/23 pm, for inc WOB on AVAPS, s/p intubation and transfer to MICU, required pressor support-> downgraded 1/29  - Sepsis protocol initiated  - CXR w/ mild pulm congestion.  - Diuresis as tolerated  - Thyroid US w/ nodule  - NGT in place, c/w TF's as tolerated-> FEEST -> rec pureed /mod thick liquids   - 1/25 CXR with R infiltrate, Bcx remain NGTD, WBC down  - 1/25 Scx with rare Pseudomonas aeruginosa -pansensitive   - MRI brain with metastatic disease  - 1/28 s/p extubation   - Discontinued zosyn --completed 7d course 1/29  - Monitor off antibiotics per ID rec's  - ID and Pulm following    # Syncopal seizure: Improved, downgraded  - Patient with hx of epilepsy, generalized grand mal seizures  - C/w Lamictal and Keppra  - CTH/C spine/MF negative for acute ICH, notable for mild R hematoma  - 1/17 sp multiple RRTs  for grand mal seizure activity and was initially recommended sepsis workup and antipyretics given T103F, but patient had x2 more episode and was ultimately given ativan 2mg, vimpat load 200mg, and intubated for airway protection with MICU transfer.   - Extubated 1/20  - Keppra 1.5g BID, Lamotrigine 100mg BID, Gabapentin 300mg BID, Vimpat 150mg BID  - Ativan for seizures PRM  - Per Neuro -> MRI brain w and wo contrast to look for potential seizure foci that could cause increased frequency especially given hx of metastatic testicular cancer; deferred MRI given copious secretions   - s/p vEEG, negative for seizures  - Care per MICU-> downgraded 1/21, back to ICU 1/23 for Hypoxia, Increased WOB, Downgraded 1/29    # Brain lesion:  - MRI brain now with 5.4 mm enhancing lesion in the LEFT middle cerebellar peduncle with associated edema suspicious for metastases  - MRI Lumbar negative for acute disease  - Neurology recs noted, new lesion not likely to cause seizure  - Seen by Heme Onc-> will need another PET-CT, can be completed outpatient once stable if concern can proceed with biopsy at that time   - Heme Onc following    # Flu:  - sp Tamiflu completed 5d on 1/22   - Monitor temps/WBC  - ID following    # CKD3: Cr 2.96  - Monitor I/O's  - Serial Cr  - Avoid nephrotoxins  - Renally dose medications  - Add free water 250 cc q 4 feeding tube   - Renal following->  Renal fnx improving     # HTN/ HLD:  - C/w CV meds    # Pneumonia:  - Completed ceftriaxone 5d course on 1/21  - Retreated for aspiration pneumonia in ICU on 1/23  - ID following    # Schizophrenia/Depression:  - C/w current meds    # VAZQUEZ:  - CPAP at night    # Hx of metastatic testicular cancer, s/p L orchiectomy, on etoposide/cisplatin chemo, last dose on 6/21/24:  - Outpt Oncology follow-up    # DVT ppx:  - Heparin sq    [facility]  [phone]

## 2025-02-01 NOTE — PROGRESS NOTE ADULT - ASSESSMENT
69M w/ hx of metastatic testicular cancer (s/p L orchiectomy, on etoposide/cisplatin chemo, last dose 6/2024), epilepsy (grand-mal seizures), schizophrenia, developmental delay, HTN, HLD, VAZQUEZ, stage III CKD, obesity, secondary hyperparathyroidism, anemia, thrombocytopenia, ? spinal surgery who was admitted on 1/16/25 for fall and syncope. On 1/17 patient had multiple RRTs called for grand mal seizure activity and  patient had x2 more episode and was ultimately given ativan 2mg, Vimpat load 200mg, and intubated for airway protection with MICU transfer. Extubated 1/19 - to AVAPS, post extubation with increased secretion, now better; downgraded 1/21; While on medical floors; patient developed respiratory distress; now intubated for suspected aspiration pna vs flash pulmonary edema. Extubated 1/28/25.    Problem/Plan #1: Acute Hypoxic Respiratory Failure  - Intubated for airway protection i/s/o multiple RRTs for grand mal seizure activity, Extubated 1/19 to AVAPS  - Reintubated 1/23 after hypoxic episode for suspected aspiration pna vs flash pulmonary edema.   - BNP 3949  - CXR shows mild pulm edema on 1/16; CXR 1/25  Bibasilar atelectasis. Mild interstitial edema.. Improved aeration in the right upper lobe  - TTE with preserved EF, no WMA, dilated aortic root  - Was on Bumex IV - stopped on 1/25 due to ^ creatinine 4.31 from 3.92  - CT chest 1/30 - concerning for infection, complete right middle/lower lobe consolidation/collapse; no pleural or pericardial effusion; pulm & ID following  - Check proBNP 1/31 1134 (down from 3949 on 1/23)    Problem/Plan #2: Hypertensive Urgency  - BP now stable off anti-hypertensives. Will slowly re-implement as BP recovers.    Problem/Plan #3: HLD  - c/w atorvastatin 20mg daily    Problem/Plan #4: DVT Ppx  - c/w SQ heparin      Dr. Andrew to follow     Betsey Tran MD [facility]  Attending Interventional Cardiologist, [email or web address].   [employer] on [employer] Team

## 2025-02-01 NOTE — PROGRESS NOTE ADULT - ASSESSMENT
Patient is a 69 year old male with PMH of metastatic testicular cancer (s/p L orchiectomy, on etoposide/cisplatin chemo, last dose 6/2024), epilepsy (grand-mal seizures), schizophrenia, developmental delay, HTN, HLD, VAZQUEZ, stage III CKD, severe obesity, secondary hyperparathyroidism, anemia, thrombocytopenia who presented to ED for fall, possible seizure prior. Patient reported cough for few weeks with no other respiratory symptoms.   Admitted for further work up for syncope, c/f seizure   Influenza A  1/17 s/p RRTs for grand mal seizure activity, s/p intubation and transfer to MICU  - 1/17 CT chest with anterior bowing of posterior tracheal wall suggestive of tracheomalacia, trace R pleural effusion   - MRSA PCR screen negative    - 1/18 sputum culture negative    - s/p extubation 1/19   - 1/20 Bcx negative    - s/p ceftriaxone 1/17-1/21   - s/p tamiflu 1/17-1/21    Sepsis/sirs, AHRF possible flash pulmonary edema iso uncontrolled HTN, c/f aspiration pneumonia   - 1/23 s/p RRT am for hypoxia/inc WOB, febrile last night 100.6F and now 101F this am, tachycardia, leukocytosis 11k   - repeat COVID/Flu/RSV with known influenza A-likely shedding   - 1/23 afternoon s/p RRT for inc WOB on AVAPS, s/p intubation and transfer to MICU, required pressor support  - MRSA PCR screen negative   - 1/24 afebrile overnight, WBC uptrending, weaned off pressor earlier in am, on zosyn   - 1/25 CXR with R infiltrate, Bcx remain NGTD, WBC down  - 1/25 Scx with rare Pseudomonas aeruginosa -pansensitive   - MRI brain with metastatic disease, thyroid us with nodule  - 1/26 CT spine with no acute abn of spine, stable postop changes and hardware; noted with multifocal lung abn with bibasilar atelectasis or consolidation and patchy airspace opacities in the RUL  - 1/28 s/p extubation   - 1/30 CT chest with bronchial secretions with complete RML/RLL consolidation/collapse, scattered b/l upper lobe ggo, no RUL mass noted, possible resolving pneumonia   - reportedly h/o penicillin allergy--tolerating zosyn, removed from allergy list   - remains afebrile, WBC wnl, on NC, nontoxic appearing with no new complaints, completed antibiotics 1/30 am     s/p cefepime 1/23  s/p zosyn 1/23-1/30    Recommendations:   Continue to monitor off antibiotics   Pulmonary following, chest PT, BD, mucomyst   Monitor temps/WBC - if any fevers or worsening, send cultures and restart on zosyn   Check HSV from lip lesions, ordered   Aspiration precautions   Continue rest of care per primary team     D/w patient and sister at bedside  Greyson Mart M.D.  [facility]  Available on [employer] TE[employer] - *PREFERRED*  [phone]  After 5pm on weekdays and all day on weekends - please call [phone]     Thank you for consulting us and involving us in the management of this patients case. In addition to reviewing history, imaging, documents, labs, microbiology, took into account antibiotic stewardship, local antibiogram and infection control strategies and potential transmission issues.

## 2025-02-01 NOTE — PROGRESS NOTE ADULT - SUBJECTIVE AND OBJECTIVE BOX
Cardiology Attending Follow-up Note- Covering for Dr. Gus Andrew      Patient seen and examined at bedside.    Overnight Events:     lethargic     REVIEW OF SYSTEMS:  Constitutional:     [x ] negative [ ] fevers [ ] chills [ ] weight loss [ ] weight gain  HEENT:                  [x ] negative [ ] dry eyes [ ] eye irritation [ ] postnasal drip [ ] nasal congestion  CV:                         [ x] negative  [ ] chest pain [ ] orthopnea [ ] palpitations [ ] murmur  Resp:                     [ x] negative [ ] cough [ ] shortness of breath [ ] dyspnea [ ] wheezing [ ] sputum [ ]hemoptysis  GI:                          [ x] negative [ ] nausea [ ] vomiting [ ] diarrhea [ ] constipation [ ] abd pain [ ] dysphagia   :                        [ x] negative [ ] dysuria [ ] nocturia [ ] hematuria [ ] increased urinary frequency  Musculoskeletal: [ x] negative [ ] back pain [ ] myalgias [ ] arthralgias [ ] fracture  Skin:                       [ x] negative [ ] rash [ ] itch  Neurological:        [x ] negative [ ] headache [ ] dizziness [ ] syncope [ ] weakness [ ] numbness  Psychiatric:           [ x] negative [ ] anxiety [ ] depression  Endocrine:            [ x] negative [ ] diabetes [ ] thyroid problem  Heme/Lymph:      [ x] negative [ ] anemia [ ] bleeding problem  Allergic/Immune: [ x] negative [ ] itchy eyes [ ] nasal discharge [ ] hives [ ] angioedema    [ x] All other systems negative  [ ] Unable to assess ROS due to    Current Meds:  acetaminophen   IVPB .. 1000 milliGRAM(s) IV Intermittent once  acetylcysteine 20%  Inhalation 3 milliLiter(s) Inhalation every 6 hours  albuterol/ipratropium for Nebulization 3 milliLiter(s) Nebulizer every 6 hours  atorvastatin 20 milliGRAM(s) Oral at bedtime  bacitracin   Ointment 1 Application(s) Topical two times a day  chlorhexidine 2% Cloths 1 Application(s) Topical <User Schedule>  chlorhexidine 2% Cloths 1 Application(s) Topical <User Schedule>  escitalopram 15 milliGRAM(s) Oral daily  gabapentin Solution 150 milliGRAM(s) Oral two times a day  heparin   Injectable 7500 Unit(s) SubCutaneous every 8 hours  influenza  Vaccine (HIGH DOSE) 0.5 milliLiter(s) IntraMuscular once  lacosamide IVPB 100 milliGRAM(s) IV Intermittent every 12 hours  lamoTRIgine 100 milliGRAM(s) Oral two times a day  levETIRAcetam  IVPB 750 milliGRAM(s) IV Intermittent two times a day  lidocaine   4% Patch 1 Patch Transdermal daily  lurasidone 40 milliGRAM(s) Oral daily  nystatin Powder 1 Application(s) Topical three times a day PRN  pantoprazole  Injectable 40 milliGRAM(s) IV Push daily  polyethylene glycol 3350 17 Gram(s) Oral daily  senna 2 Tablet(s) Oral at bedtime  sevelamer carbonate 800 milliGRAM(s) Oral three times a day  valACYclovir 1000 milliGRAM(s) Oral every 12 hours      PAST MEDICAL & SURGICAL HISTORY:  Hypertension, unspecified type      Other hyperlipidemia      History of epilepsy      Paranoid schizophrenia      Obstructive sleep apnea      Testicular cancer      H/O Spinal surgery          Vitals:  T(F): 98.2 (02-02), Max: 98.4 (02-02)  HR: 87 (02-02) (77 - 100)  BP: 134/83 (02-02) (130/85 - 134/83)  RR: 18 (02-02)  SpO2: 97% (02-02)  I&O's Summary    01 Feb 2025 07:01  -  02 Feb 2025 07:00  --------------------------------------------------------  IN: 0 mL / OUT: 1200 mL / NET: -1200 mL    02 Feb 2025 07:01  -  03 Feb 2025 00:26  --------------------------------------------------------  IN: 0 mL / OUT: 1175 mL / NET: -1175 mL        Physical Exam:  Appearance: No acute distress  HENT: No JVD   Cardiovascular: RRR, S1/S2, no murmurs  Respiratory: CTABL  Gastrointestinal: soft, NT ND, +BS  Musculoskeletal: No clubbing, no edema   Neurologic: Non-focal  Skin: No rashes, ecchymoses, or cyanosis                          10.1   7.50  )-----------( 254      ( 01 Feb 2025 06:51 )             32.7     02-02    145  |  114[H]  |  83[H]  ----------------------------<  156[H]  4.5   |  18[L]  |  2.52[H]    Ca    9.6      02 Feb 2025 12:37    TPro  6.7  /  Alb  3.3  /  TBili  0.3  /  DBili  x   /  AST  55[H]  /  ALT  38  /  AlkPhos  133[H]  02-02                  Cardiovascular Testings:

## 2025-02-01 NOTE — PROGRESS NOTE ADULT - SUBJECTIVE AND OBJECTIVE BOX
[facility]  JACINTO Horton Y. Patel, S. Shah, G. Casimir  [phone]  ([phone] - weekdays after 5pm and weekends)    Name: OSCAR MILAN  Age/Gender: 69y Male  MRN: [id number]    Interval History:  Patient seen and examined this morning.   No new complaints noted.  Notes reviewed  No concerning overnight events  Afebrile. Sister at bedside.    Allergies: penicillin (Hives)  seasonal allergies (Unknown)      Objective:  Vitals:   T(F): 98.4 (02-01-25 @ 08:08), Max: 98.4 (02-01-25 @ 08:08)  HR: 89 (02-01-25 @ 08:08) (80 - 95)  BP: 156/94 (02-01-25 @ 08:08) (129/76 - 156/94)  RR: 18 (02-01-25 @ 08:08) (18 - 18)  SpO2: 97% (02-01-25 @ 08:08) (96% - 98%)  Physical Examination:  General: no acute distress, NC  HEENT: NC/AT, lip lesions, anicteric  Respiratory: coarse breath sounds   Cardiovascular: S1 and S2 present, normal rate   Gastrointestinal: nontender, nondistended  Extremities: no edema, no cyanosis  Skin: no visible rash    Laboratory Studies:  CBC:                       10.1   7.50  )-----------( 254      ( 01 Feb 2025 06:51 )             32.7     WBC Trend:  7.50 02-01-25 @ 06:51  6.09 01-31-25 @ 06:59  7.16 01-30-25 @ 07:15  7.01 01-29-25 @ 00:40  6.50 01-28-25 @ 00:17  6.98 01-27-25 @ 00:32  9.04 01-26-25 @ 00:16    CMP: 02-01    151[H]  |  116[H]  |  101[H]  ----------------------------<  104[H]  4.5   |  20[L]  |  2.96[H]    Ca    10.0      01 Feb 2025 06:51      Creatinine: 2.96 mg/dL (02-01-25 @ 06:51)  Creatinine: 3.46 mg/dL (01-31-25 @ 06:59)  Creatinine: 4.25 mg/dL (01-30-25 @ 07:12)  Creatinine: 4.71 mg/dL (01-29-25 @ 11:56)  Creatinine: 5.08 mg/dL (01-29-25 @ 00:39)  Creatinine: 4.86 mg/dL (01-28-25 @ 15:25)  Creatinine: 4.80 mg/dL (01-28-25 @ 00:17)  Creatinine: 4.68 mg/dL (01-27-25 @ 00:32)  Creatinine: 4.66 mg/dL (01-26-25 @ 12:10)  Creatinine: 4.70 mg/dL (01-26-25 @ 00:17)  Creatinine: 4.76 mg/dL (01-25-25 @ 17:46)  Creatinine: 4.70 mg/dL (01-25-25 @ 11:31)    Microbiology: reviewed   Culture - Sputum (collected 01-25-25 @ 07:21)  Source: .Sputum Sputum  Gram Stain (01-26-25 @ 23:12):    Numerous polymorphonuclear leukocytes per low power field    Rare Squamous epithelial cells per low power field    No organisms seen  Final Report (01-27-25 @ 17:11):    Rare Pseudomonas aeruginosa    Commensal lucia consistent with body site  Organism: Pseudomonas aeruginosa (01-27-25 @ 17:11)  Organism: Pseudomonas aeruginosa (01-27-25 @ 17:11)      Method Type: MARIELENA      -  Aztreonam: S <=4      -  Cefepime: S <=2      -  Ceftazidime: S 4      -  Ciprofloxacin: S <=0.25      -  Imipenem: S 2      -  Levofloxacin: S <=0.5      -  Meropenem: S <=1      -  Piperacillin/Tazobactam: S <=8    Culture - Urine (collected 01-24-25 @ 17:16)  Source: Catheterized Catheterized  Final Report (01-26-25 @ 02:35):    No growth    Culture - Blood (collected 01-23-25 @ 15:14)  Source: .Blood BLOOD  Final Report (01-28-25 @ 20:00):    No growth at 5 days    Culture - Blood (collected 01-23-25 @ 15:14)  Source: .Blood BLOOD  Final Report (01-28-25 @ 20:00):    No growth at 5 days    Culture - Blood (collected 01-23-25 @ 12:21)  Source: .Blood BLOOD  Final Report (01-28-25 @ 17:00):    No growth at 5 days    Culture - Blood (collected 01-23-25 @ 12:21)  Source: .Blood BLOOD  Final Report (01-28-25 @ 17:00):    No growth at 5 days    Culture - Blood (collected 01-23-25 @ 00:15)  Source: .Blood BLOOD  Final Report (01-28-25 @ 04:01):    No growth at 5 days    Culture - Blood (collected 01-23-25 @ 00:10)  Source: .Blood BLOOD  Final Report (01-28-25 @ 04:01):    No growth at 5 days    Culture - Blood (collected 01-20-25 @ 01:52)  Source: .Blood BLOOD  Final Report (01-25-25 @ 09:00):    No growth at 5 days    Culture - Blood (collected 01-19-25 @ 19:28)  Source: .Blood BLOOD  Final Report (01-25-25 @ 04:00):    No growth at 5 days      Radiology: reviewed     Medications:  acetylcysteine 20%  Inhalation 3 milliLiter(s) Inhalation every 6 hours  albuterol/ipratropium for Nebulization 3 milliLiter(s) Nebulizer every 6 hours  atorvastatin 20 milliGRAM(s) Oral at bedtime  bacitracin   Ointment 1 Application(s) Topical two times a day  chlorhexidine 2% Cloths 1 Application(s) Topical <User Schedule>  chlorhexidine 2% Cloths 1 Application(s) Topical <User Schedule>  escitalopram 15 milliGRAM(s) Oral daily  gabapentin Solution 150 milliGRAM(s) Oral two times a day  heparin   Injectable 7500 Unit(s) SubCutaneous every 8 hours  influenza  Vaccine (HIGH DOSE) 0.5 milliLiter(s) IntraMuscular once  lacosamide IVPB 100 milliGRAM(s) IV Intermittent every 12 hours  lamoTRIgine 100 milliGRAM(s) Oral two times a day  levETIRAcetam  IVPB 750 milliGRAM(s) IV Intermittent two times a day  lidocaine   4% Patch 1 Patch Transdermal daily  lurasidone 40 milliGRAM(s) Oral daily  nystatin Powder 1 Application(s) Topical three times a day PRN  pantoprazole  Injectable 40 milliGRAM(s) IV Push daily  polyethylene glycol 3350 17 Gram(s) Oral daily  senna Syrup 10 milliLiter(s) Oral at bedtime  sevelamer carbonate 800 milliGRAM(s) Oral three times a day    Prior/Completed Antimicrobials:  cefTRIAXone   IVPB  cefTRIAXone   IVPB  oseltamivir  piperacillin/tazobactam IVPB..  vancomycin  IVPB

## 2025-02-01 NOTE — PROGRESS NOTE ADULT - SUBJECTIVE AND OBJECTIVE BOX
[facility]   [phone]  RENAL FOLLOW UP NOTE  --------------------------------------------------------------------------------  Chief Complaint:    24 hour events/subjective:    seen earlier   states feels better   has no ngt     PAST HISTORY  --------------------------------------------------------------------------------  No significant changes to PMH, PSH, FHx, SHx, unless otherwise noted    ALLERGIES & MEDICATIONS  --------------------------------------------------------------------------------  Allergies    penicillin (Hives)  seasonal allergies (Unknown)    Intolerances    latex (Rash)    Standing Inpatient Medications  acetaminophen   IVPB .. 1000 milliGRAM(s) IV Intermittent once  acetylcysteine 20%  Inhalation 3 milliLiter(s) Inhalation every 6 hours  albuterol/ipratropium for Nebulization 3 milliLiter(s) Nebulizer every 6 hours  atorvastatin 20 milliGRAM(s) Oral at bedtime  bacitracin   Ointment 1 Application(s) Topical two times a day  chlorhexidine 2% Cloths 1 Application(s) Topical <User Schedule>  chlorhexidine 2% Cloths 1 Application(s) Topical <User Schedule>  escitalopram 15 milliGRAM(s) Oral daily  gabapentin Solution 150 milliGRAM(s) Oral two times a day  heparin   Injectable 7500 Unit(s) SubCutaneous every 8 hours  influenza  Vaccine (HIGH DOSE) 0.5 milliLiter(s) IntraMuscular once  lacosamide IVPB 100 milliGRAM(s) IV Intermittent every 12 hours  lamoTRIgine 100 milliGRAM(s) Oral two times a day  levETIRAcetam  IVPB 750 milliGRAM(s) IV Intermittent two times a day  lidocaine   4% Patch 1 Patch Transdermal daily  lurasidone 40 milliGRAM(s) Oral daily  pantoprazole  Injectable 40 milliGRAM(s) IV Push daily  polyethylene glycol 3350 17 Gram(s) Oral daily  senna Syrup 10 milliLiter(s) Oral at bedtime  sevelamer carbonate 800 milliGRAM(s) Oral three times a day    PRN Inpatient Medications  nystatin Powder 1 Application(s) Topical three times a day PRN      REVIEW OF SYSTEMS  --------------------------------------------------------------------------------    Gen: denies  fevers/chills, or HA or dizziness   CVS: denies chest pain/palpitations  Resp: denies SOB/Cough  GI: Denies N/V/Abd pain  : Denies dysuria/oliguria/hematuria        VITALS/PHYSICAL EXAM  --------------------------------------------------------------------------------  T(C): 36.7 (02-01-25 @ 17:06), Max: 36.9 (02-01-25 @ 08:08)  HR: 83 (02-01-25 @ 17:06) (83 - 90)  BP: 136/88 (02-01-25 @ 17:06) (136/88 - 156/94)  RR: 18 (02-01-25 @ 17:06) (18 - 18)  SpO2: 97% (02-01-25 @ 17:06) (96% - 98%)  Wt(kg): --        01-31-25 @ 07:01  -  02-01-25 @ 07:00  --------------------------------------------------------  IN: 0 mL / OUT: 1200 mL / NET: -1200 mL      Physical Exam:  	    Gen: comfortable appearing   	no jvd  	Pulm: decrease bs  no rales  +  ronchi or wheezing  	CV: tachy  S1S2; no rub  	Abd: hypoactive bs soft distended  	UE: Warm, no cyanosis  no clubbing,  no edema;   	LE: Warm, no cyanosis  no clubbing, no edema  	Neuro:  alert and oriented    LABS/STUDIES  --------------------------------------------------------------------------------              10.1   7.50  >-----------<  254      [02-01-25 @ 06:51]              32.7     151  |  116  |  101  ----------------------------<  104      [02-01-25 @ 06:51]  4.5   |  20  |  2.96        Ca     10.0     [02-01-25 @ 06:51]            Creatinine Trend:  SCr 2.96 [02-01 @ 06:51]  SCr 3.46 [01-31 @ 06:59]  SCr 4.25 [01-30 @ 07:12]  SCr 4.71 [01-29 @ 11:56]  SCr 5.08 [01-29 @ 00:39]

## 2025-02-01 NOTE — PROGRESS NOTE ADULT - SUBJECTIVE AND OBJECTIVE BOX
[facility] INPATIENT PROGRESS NOTE     Interval Hx:   02-01-25: Mr. Gr was seen at bedside today.    Meds:   MEDICATIONS  (STANDING):  acetylcysteine 20%  Inhalation 3 milliLiter(s) Inhalation every 6 hours  albuterol/ipratropium for Nebulization 3 milliLiter(s) Nebulizer every 6 hours  atorvastatin 20 milliGRAM(s) Oral at bedtime  bacitracin   Ointment 1 Application(s) Topical two times a day  chlorhexidine 2% Cloths 1 Application(s) Topical <User Schedule>  chlorhexidine 2% Cloths 1 Application(s) Topical <User Schedule>  escitalopram 15 milliGRAM(s) Oral daily  gabapentin Solution 150 milliGRAM(s) Oral two times a day  heparin   Injectable 7500 Unit(s) SubCutaneous every 8 hours  influenza  Vaccine (HIGH DOSE) 0.5 milliLiter(s) IntraMuscular once  lacosamide IVPB 100 milliGRAM(s) IV Intermittent every 12 hours  lamoTRIgine 100 milliGRAM(s) Oral two times a day  levETIRAcetam  IVPB 750 milliGRAM(s) IV Intermittent two times a day  lidocaine   4% Patch 1 Patch Transdermal daily  lurasidone 40 milliGRAM(s) Oral daily  pantoprazole  Injectable 40 milliGRAM(s) IV Push daily  polyethylene glycol 3350 17 Gram(s) Oral daily  senna Syrup 10 milliLiter(s) Oral at bedtime  sevelamer carbonate Powder 800 milliGRAM(s) Oral three times a day with meals    MEDICATIONS  (PRN):  nystatin Powder 1 Application(s) Topical three times a day PRN fungal infection you may see and want to treat    Vital Signs Last 24 Hrs  T(C): 36.7 (01 Feb 2025 00:41), Max: 36.7 (31 Jan 2025 15:36)  T(F): 98.1 (01 Feb 2025 00:41), Max: 98.1 (31 Jan 2025 15:36)  HR: 83 (01 Feb 2025 05:22) (80 - 95)  BP: 141/90 (01 Feb 2025 00:41) (129/76 - 148/83)  BP(mean): --  RR: 18 (01 Feb 2025 00:41) (18 - 18)  SpO2: 97% (01 Feb 2025 05:22) (93% - 98%)    Parameters below as of 01 Feb 2025 00:41  Patient On (Oxygen Delivery Method): nasal cannula  O2 Flow (L/min): 4    Physical Exam:  Gen: NAD  HEENT: EOMI, MMM  Chest: equal chest rise  Cardiac: regular   Abd: non distended   Neuro: AAOx2    Labs:                        9.5    6.09  )-----------( 241      ( 31 Jan 2025 06:59 )             31.8     CBC Full  -  ( 31 Jan 2025 06:59 )  WBC Count : 6.09 K/uL  RBC Count : 3.09 M/uL  Hemoglobin : 9.5 g/dL  Hematocrit : 31.8 %  Platelet Count - Automated : 241 K/uL  Mean Cell Volume : 102.9 fl  Mean Cell Hemoglobin : 30.7 pg  Mean Cell Hemoglobin Concentration : 29.9 g/dL    01-31    150[H]  |  114[H]  |  108[H]  ----------------------------<  145[H]  4.2   |  22  |  3.46[H]    Ca    9.9      31 Jan 2025 06:59  Phos  5.2     01-30  Mg     3.6     01-30    TPro  6.8  /  Alb  3.4  /  TBili  0.3  /  DBili  x   /  AST  50[H]  /  ALT  55[H]  /  AlkPhos  137[H]  01-30    PT/INR - ( 30 Jan 2025 07:17 )   PT: 10.0 sec;   INR: 0.87 ratio         PTT - ( 30 Jan 2025 07:17 )  PTT:26.7 sec   [facility] INPATIENT PROGRESS NOTE     Interval Hx:   02-01-25: Mr. Gr was seen at bedside today, awake and alert, no overnight events noted, recapped hospitalization, aware of outpatient plan once he is stable, no signs of bleeding     Meds:   MEDICATIONS  (STANDING):  acetylcysteine 20%  Inhalation 3 milliLiter(s) Inhalation every 6 hours  albuterol/ipratropium for Nebulization 3 milliLiter(s) Nebulizer every 6 hours  atorvastatin 20 milliGRAM(s) Oral at bedtime  bacitracin   Ointment 1 Application(s) Topical two times a day  chlorhexidine 2% Cloths 1 Application(s) Topical <User Schedule>  chlorhexidine 2% Cloths 1 Application(s) Topical <User Schedule>  escitalopram 15 milliGRAM(s) Oral daily  gabapentin Solution 150 milliGRAM(s) Oral two times a day  heparin   Injectable 7500 Unit(s) SubCutaneous every 8 hours  influenza  Vaccine (HIGH DOSE) 0.5 milliLiter(s) IntraMuscular once  lacosamide IVPB 100 milliGRAM(s) IV Intermittent every 12 hours  lamoTRIgine 100 milliGRAM(s) Oral two times a day  levETIRAcetam  IVPB 750 milliGRAM(s) IV Intermittent two times a day  lidocaine   4% Patch 1 Patch Transdermal daily  lurasidone 40 milliGRAM(s) Oral daily  pantoprazole  Injectable 40 milliGRAM(s) IV Push daily  polyethylene glycol 3350 17 Gram(s) Oral daily  senna Syrup 10 milliLiter(s) Oral at bedtime  sevelamer carbonate Powder 800 milliGRAM(s) Oral three times a day with meals    MEDICATIONS  (PRN):  nystatin Powder 1 Application(s) Topical three times a day PRN fungal infection you may see and want to treat    Vital Signs Last 24 Hrs  T(C): 36.7 (01 Feb 2025 00:41), Max: 36.7 (31 Jan 2025 15:36)  T(F): 98.1 (01 Feb 2025 00:41), Max: 98.1 (31 Jan 2025 15:36)  HR: 83 (01 Feb 2025 05:22) (80 - 95)  BP: 141/90 (01 Feb 2025 00:41) (129/76 - 148/83)  BP(mean): --  RR: 18 (01 Feb 2025 00:41) (18 - 18)  SpO2: 97% (01 Feb 2025 05:22) (93% - 98%)    Parameters below as of 01 Feb 2025 00:41  Patient On (Oxygen Delivery Method): nasal cannula  O2 Flow (L/min): 4    Physical Exam:  Gen: NAD  HEENT: EOMI, MMM  Chest: equal chest rise  Cardiac: regular   Abd: non distended   Neuro: AAOx2    Labs:                        9.5    6.09  )-----------( 241      ( 31 Jan 2025 06:59 )             31.8     CBC Full  -  ( 31 Jan 2025 06:59 )  WBC Count : 6.09 K/uL  RBC Count : 3.09 M/uL  Hemoglobin : 9.5 g/dL  Hematocrit : 31.8 %  Platelet Count - Automated : 241 K/uL  Mean Cell Volume : 102.9 fl  Mean Cell Hemoglobin : 30.7 pg  Mean Cell Hemoglobin Concentration : 29.9 g/dL    01-31    150[H]  |  114[H]  |  108[H]  ----------------------------<  145[H]  4.2   |  22  |  3.46[H]    Ca    9.9      31 Jan 2025 06:59  Phos  5.2     01-30  Mg     3.6     01-30    TPro  6.8  /  Alb  3.4  /  TBili  0.3  /  DBili  x   /  AST  50[H]  /  ALT  55[H]  /  AlkPhos  137[H]  01-30    PT/INR - ( 30 Jan 2025 07:17 )   PT: 10.0 sec;   INR: 0.87 ratio         PTT - ( 30 Jan 2025 07:17 )  PTT:26.7 sec

## 2025-02-01 NOTE — PROGRESS NOTE ADULT - SUBJECTIVE AND OBJECTIVE BOX
Date of Service: 02-01-25 @ 14:42    Patient is a 69y old  Male who presents with a chief complaint of seizure, flu, elevated trop (01 Feb 2025 09:43)      Any change in ROS: on 4 L of oxygen :  no sob:  alert and awke and mother is at bedside:   heis pretty obese:     MEDICATIONS  (STANDING):  acetylcysteine 20%  Inhalation 3 milliLiter(s) Inhalation every 6 hours  albuterol/ipratropium for Nebulization 3 milliLiter(s) Nebulizer every 6 hours  atorvastatin 20 milliGRAM(s) Oral at bedtime  bacitracin   Ointment 1 Application(s) Topical two times a day  chlorhexidine 2% Cloths 1 Application(s) Topical <User Schedule>  chlorhexidine 2% Cloths 1 Application(s) Topical <User Schedule>  escitalopram 15 milliGRAM(s) Oral daily  gabapentin Solution 150 milliGRAM(s) Oral two times a day  heparin   Injectable 7500 Unit(s) SubCutaneous every 8 hours  influenza  Vaccine (HIGH DOSE) 0.5 milliLiter(s) IntraMuscular once  lacosamide IVPB 100 milliGRAM(s) IV Intermittent every 12 hours  lamoTRIgine 100 milliGRAM(s) Oral two times a day  levETIRAcetam  IVPB 750 milliGRAM(s) IV Intermittent two times a day  lidocaine   4% Patch 1 Patch Transdermal daily  lurasidone 40 milliGRAM(s) Oral daily  pantoprazole  Injectable 40 milliGRAM(s) IV Push daily  polyethylene glycol 3350 17 Gram(s) Oral daily  senna Syrup 10 milliLiter(s) Oral at bedtime  sevelamer carbonate 800 milliGRAM(s) Oral three times a day    MEDICATIONS  (PRN):  nystatin Powder 1 Application(s) Topical three times a day PRN fungal infection you may see and want to treat    Vital Signs Last 24 Hrs  T(C): 36.9 (01 Feb 2025 08:08), Max: 36.9 (01 Feb 2025 08:08)  T(F): 98.4 (01 Feb 2025 08:08), Max: 98.4 (01 Feb 2025 08:08)  HR: 88 (01 Feb 2025 08:55) (83 - 89)  BP: 156/94 (01 Feb 2025 08:08) (129/76 - 156/94)  BP(mean): --  RR: 18 (01 Feb 2025 08:08) (18 - 18)  SpO2: 97% (01 Feb 2025 08:55) (96% - 98%)    Parameters below as of 01 Feb 2025 08:08  Patient On (Oxygen Delivery Method): nasal cannula  O2 Flow (L/min): 4      I&O's Summary    31 Jan 2025 07:01  -  01 Feb 2025 07:00  --------------------------------------------------------  IN: 0 mL / OUT: 1200 mL / NET: -1200 mL          Physical Exam:   GENERAL: NAD, well-groomed, well-developed  HEENT: BREE/   Atraumatic, Normocephalic  ENMT: No tonsillar erythema, exudates, or enlargement; Moist mucous membranes, Good dentition, No lesions  NECK: Supple, No JVD, Normal thyroid  CHEST/LUNG: poor tonya air entry    CVS: Regular rate and rhythm; No murmurs, rubs, or gallops  GI: : Soft, Nontender, Nondistended; Bowel sounds present  NERVOUS SYSTEM:  Alert & Oriented X3  EXTREMITIES:mild   edema  LYMPH: No lymphadenopathy noted  SKIN: No rashes or lesions  ENDOCRINOLOGY: No Thyromegaly  PSYCH: Appropriate    Labs:  ABG - ( 31 Jan 2025 06:00 )  pH, Arterial: 7.37  pH, Blood: x     /  pCO2: 44    /  pO2: 100   / HCO3: 25    / Base Excess: -0.2  /  SaO2: 97.3            24, 21.0, 30.0, 30.0                            10.1   7.50  )-----------( 254      ( 01 Feb 2025 06:51 )             32.7                         9.5    6.09  )-----------( 241      ( 31 Jan 2025 06:59 )             31.8                         9.6    7.16  )-----------( 237      ( 30 Jan 2025 07:15 )             31.2                         9.9    7.01  )-----------( 215      ( 29 Jan 2025 00:40 )             31.9     02-01    151[H]  |  116[H]  |  101[H]  ----------------------------<  104[H]  4.5   |  20[L]  |  2.96[H]  01-31    150[H]  |  114[H]  |  108[H]  ----------------------------<  145[H]  4.2   |  22  |  3.46[H]  01-30    147[H]  |  109[H]  |  117[H]  ----------------------------<  135[H]  3.9   |  22  |  4.25[H]  01-29    146[H]  |  106  |  117[H]  ----------------------------<  135[H]  3.3[L]   |  21[L]  |  4.71[H]  01-29    147[H]  |  105  |  127[H]  ----------------------------<  146[H]  3.3[L]   |  20[L]  |  5.08[H]  01-28    148[H]  |  106  |  114[H]  ----------------------------<  123[H]  3.8   |  21[L]  |  4.86[H]    Ca    10.0      01 Feb 2025 06:51  Ca    9.9      31 Jan 2025 06:59    TPro  6.8  /  Alb  3.4  /  TBili  0.3  /  DBili  x   /  AST  50[H]  /  ALT  55[H]  /  AlkPhos  137[H]  01-30  TPro  7.1  /  Alb  3.4  /  TBili  0.3  /  DBili  x   /  AST  62[H]  /  ALT  62[H]  /  AlkPhos  139[H]  01-29  TPro  6.9  /  Alb  3.4  /  TBili  0.4  /  DBili  x   /  AST  63[H]  /  ALT  67[H]  /  AlkPhos  142[H]  01-29  TPro  7.3  /  Alb  3.7  /  TBili  0.4  /  DBili  x   /  AST  67[H]  /  ALT  72[H]  /  AlkPhos  136[H]  01-28    CAPILLARY BLOOD GLUCOSE      POCT Blood Glucose.: 123 mg/dL (31 Jan 2025 18:03)          Urinalysis Basic - ( 01 Feb 2025 06:51 )    Color: x / Appearance: x / SG: x / pH: x  Gluc: 104 mg/dL / Ketone: x  / Bili: x / Urobili: x   Blood: x / Protein: x / Nitrite: x   Leuk Esterase: x / RBC: x / WBC x   Sq Epi: x / Non Sq Epi: x / Bacteria: x        rad< from: CT Chest No Cont (01.30.25 @ 13:37) >  HEART: Heart size is normal. No pericardial effusion.    VISUALIZED UPPER ABDOMEN: Enteric tube terminates in the stomach on    imaging    CHEST WALL AND BONES: Thoracic spinal fusion hardware. No aggressive   osseous lesion. Old right clavicular and bilateral anterior rib fractures.    IMPRESSION:  New secretions at the level of the bronchus intermedius with complete   right middle/lower bilobar consolidation/collapse.    New scattered bilateral upper lobe groundglass opacities, concerning for   infection.    --- End of Report ---          NORMA MENDEZ MD; Resident Radiologist  This document has been electronically signed.  SALONI CUELLAR M.D., Attending Radiologist  This document has been electronically signed. Jan 30 2025  4:03PM    < end of copied text >      RECENT CULTURES:        RESPIRATORY CULTURES:          Studies  Chest X-RAY  CT SCAN Chest   Venous Dopplers: LE:   CT Abdomen  Others

## 2025-02-02 LAB
ALBUMIN SERPL ELPH-MCNC: 3.3 G/DL — SIGNIFICANT CHANGE UP (ref 3.3–5)
ALP SERPL-CCNC: 133 U/L — HIGH (ref 40–120)
ALT FLD-CCNC: 38 U/L — SIGNIFICANT CHANGE UP (ref 10–45)
ANION GAP SERPL CALC-SCNC: 13 MMOL/L — SIGNIFICANT CHANGE UP (ref 5–17)
AST SERPL-CCNC: 55 U/L — HIGH (ref 10–40)
BILIRUB SERPL-MCNC: 0.3 MG/DL — SIGNIFICANT CHANGE UP (ref 0.2–1.2)
BUN SERPL-MCNC: 83 MG/DL — HIGH (ref 7–23)
CALCIUM SERPL-MCNC: 9.6 MG/DL — SIGNIFICANT CHANGE UP (ref 8.4–10.5)
CHLORIDE SERPL-SCNC: 114 MMOL/L — HIGH (ref 96–108)
CO2 SERPL-SCNC: 18 MMOL/L — LOW (ref 22–31)
CREAT SERPL-MCNC: 2.52 MG/DL — HIGH (ref 0.5–1.3)
EGFR: 27 ML/MIN/1.73M2 — LOW
GLUCOSE SERPL-MCNC: 156 MG/DL — HIGH (ref 70–99)
POTASSIUM SERPL-MCNC: 4.5 MMOL/L — SIGNIFICANT CHANGE UP (ref 3.5–5.3)
POTASSIUM SERPL-SCNC: 4.5 MMOL/L — SIGNIFICANT CHANGE UP (ref 3.5–5.3)
PROT SERPL-MCNC: 6.7 G/DL — SIGNIFICANT CHANGE UP (ref 6–8.3)
SODIUM SERPL-SCNC: 145 MMOL/L — SIGNIFICANT CHANGE UP (ref 135–145)

## 2025-02-02 PROCEDURE — 99232 SBSQ HOSP IP/OBS MODERATE 35: CPT

## 2025-02-02 RX ORDER — GABAPENTIN 800 MG/1
150 TABLET ORAL
Refills: 0 | Status: DISCONTINUED | OUTPATIENT
Start: 2025-02-02 | End: 2025-02-06

## 2025-02-02 RX ORDER — LURASIDONE HYDROCHLORIDE 80 MG/1
40 TABLET, FILM COATED ORAL DAILY
Refills: 0 | Status: DISCONTINUED | OUTPATIENT
Start: 2025-02-02 | End: 2025-02-06

## 2025-02-02 RX ORDER — POLYETHYLENE GLYCOL 3350 17 G/17G
17 POWDER, FOR SOLUTION ORAL DAILY
Refills: 0 | Status: DISCONTINUED | OUTPATIENT
Start: 2025-02-02 | End: 2025-02-06

## 2025-02-02 RX ORDER — ESCITALOPRAM 10 MG/1
15 TABLET, FILM COATED ORAL DAILY
Refills: 0 | Status: DISCONTINUED | OUTPATIENT
Start: 2025-02-02 | End: 2025-02-06

## 2025-02-02 RX ORDER — LAMOTRIGINE 100 MG/1
100 TABLET ORAL
Refills: 0 | Status: DISCONTINUED | OUTPATIENT
Start: 2025-02-02 | End: 2025-02-06

## 2025-02-02 RX ORDER — GABAPENTIN 800 MG/1
150 TABLET ORAL
Refills: 0 | Status: DISCONTINUED | OUTPATIENT
Start: 2025-02-02 | End: 2025-02-02

## 2025-02-02 RX ORDER — VALACYCLOVIR 1000 MG/1
1000 TABLET ORAL EVERY 12 HOURS
Refills: 0 | Status: DISCONTINUED | OUTPATIENT
Start: 2025-02-02 | End: 2025-02-06

## 2025-02-02 RX ORDER — ATORVASTATIN CALCIUM 80 MG/1
20 TABLET, FILM COATED ORAL AT BEDTIME
Refills: 0 | Status: DISCONTINUED | OUTPATIENT
Start: 2025-02-02 | End: 2025-02-06

## 2025-02-02 RX ORDER — SENNOSIDES 8.6 MG
2 TABLET ORAL AT BEDTIME
Refills: 0 | Status: DISCONTINUED | OUTPATIENT
Start: 2025-02-02 | End: 2025-02-06

## 2025-02-02 RX ADMIN — GABAPENTIN 150 MILLIGRAM(S): 800 TABLET ORAL at 06:15

## 2025-02-02 RX ADMIN — LIDOCAINE HYDROCHLORIDE 1 PATCH: 30 CREAM TOPICAL at 06:10

## 2025-02-02 RX ADMIN — LIDOCAINE HYDROCHLORIDE 1 PATCH: 30 CREAM TOPICAL at 23:32

## 2025-02-02 RX ADMIN — ANTISEPTIC SURGICAL SCRUB 1 APPLICATION(S): 0.04 SOLUTION TOPICAL at 07:00

## 2025-02-02 RX ADMIN — Medication 3 MILLILITER(S): at 23:32

## 2025-02-02 RX ADMIN — Medication 7500 UNIT(S): at 21:04

## 2025-02-02 RX ADMIN — LEVETIRACETAM 400 MILLIGRAM(S): 750 TABLET, FILM COATED ORAL at 19:27

## 2025-02-02 RX ADMIN — GABAPENTIN 150 MILLIGRAM(S): 800 TABLET ORAL at 18:49

## 2025-02-02 RX ADMIN — SEVELAMER CARBONATE 800 MILLIGRAM(S): 800 TABLET, FILM COATED ORAL at 19:00

## 2025-02-02 RX ADMIN — ATORVASTATIN CALCIUM 20 MILLIGRAM(S): 80 TABLET, FILM COATED ORAL at 21:03

## 2025-02-02 RX ADMIN — Medication 3 MILLILITER(S): at 11:45

## 2025-02-02 RX ADMIN — Medication 1 APPLICATION(S): at 06:17

## 2025-02-02 RX ADMIN — LURASIDONE HYDROCHLORIDE 40 MILLIGRAM(S): 80 TABLET, FILM COATED ORAL at 11:40

## 2025-02-02 RX ADMIN — Medication 2 TABLET(S): at 21:03

## 2025-02-02 RX ADMIN — SEVELAMER CARBONATE 800 MILLIGRAM(S): 800 TABLET, FILM COATED ORAL at 21:03

## 2025-02-02 RX ADMIN — Medication 7500 UNIT(S): at 06:17

## 2025-02-02 RX ADMIN — IPRATROPIUM BROMIDE AND ALBUTEROL SULFATE 3 MILLILITER(S): .5; 2.5 SOLUTION RESPIRATORY (INHALATION) at 11:40

## 2025-02-02 RX ADMIN — LEVETIRACETAM 400 MILLIGRAM(S): 750 TABLET, FILM COATED ORAL at 06:13

## 2025-02-02 RX ADMIN — IPRATROPIUM BROMIDE AND ALBUTEROL SULFATE 3 MILLILITER(S): .5; 2.5 SOLUTION RESPIRATORY (INHALATION) at 06:17

## 2025-02-02 RX ADMIN — LAMOTRIGINE 100 MILLIGRAM(S): 100 TABLET ORAL at 18:49

## 2025-02-02 RX ADMIN — POLYETHYLENE GLYCOL 3350 17 GRAM(S): 17 POWDER, FOR SOLUTION ORAL at 11:41

## 2025-02-02 RX ADMIN — Medication 1 APPLICATION(S): at 18:50

## 2025-02-02 RX ADMIN — VALACYCLOVIR 1000 MILLIGRAM(S): 1000 TABLET ORAL at 18:49

## 2025-02-02 RX ADMIN — SEVELAMER CARBONATE 800 MILLIGRAM(S): 800 TABLET, FILM COATED ORAL at 06:16

## 2025-02-02 RX ADMIN — ESCITALOPRAM 15 MILLIGRAM(S): 10 TABLET, FILM COATED ORAL at 11:40

## 2025-02-02 RX ADMIN — LACOSAMIDE 120 MILLIGRAM(S): 200 TABLET, FILM COATED ORAL at 18:50

## 2025-02-02 RX ADMIN — LACOSAMIDE 120 MILLIGRAM(S): 200 TABLET, FILM COATED ORAL at 06:21

## 2025-02-02 RX ADMIN — Medication 3 MILLILITER(S): at 18:49

## 2025-02-02 RX ADMIN — Medication 7500 UNIT(S): at 18:48

## 2025-02-02 RX ADMIN — Medication 3 MILLILITER(S): at 06:17

## 2025-02-02 RX ADMIN — IPRATROPIUM BROMIDE AND ALBUTEROL SULFATE 3 MILLILITER(S): .5; 2.5 SOLUTION RESPIRATORY (INHALATION) at 18:50

## 2025-02-02 RX ADMIN — PANTOPRAZOLE 40 MILLIGRAM(S): 20 TABLET, DELAYED RELEASE ORAL at 11:40

## 2025-02-02 RX ADMIN — IPRATROPIUM BROMIDE AND ALBUTEROL SULFATE 3 MILLILITER(S): .5; 2.5 SOLUTION RESPIRATORY (INHALATION) at 23:32

## 2025-02-02 RX ADMIN — LAMOTRIGINE 100 MILLIGRAM(S): 100 TABLET ORAL at 06:16

## 2025-02-02 NOTE — PROGRESS NOTE ADULT - ASSESSMENT
Patient is a 69 year old male with PMH of metastatic testicular cancer (s/p L orchiectomy, on etoposide/cisplatin chemo, last dose 6/2024), epilepsy (grand-mal seizures), schizophrenia, developmental delay, HTN, HLD, VAZQUEZ, stage III CKD, severe obesity, secondary hyperparathyroidism, anemia, thrombocytopenia who presented to ED for fall, possible seizure prior. Patient reported cough for few weeks with no other respiratory symptoms.   Admitted for further work up for syncope, c/f seizure   Influenza A  1/17 s/p RRTs for grand mal seizure activity, s/p intubation and transfer to MICU  - 1/17 CT chest with anterior bowing of posterior tracheal wall suggestive of tracheomalacia, trace R pleural effusion   - MRSA PCR screen negative    - 1/18 sputum culture negative    - s/p extubation 1/19   - 1/20 Bcx negative    - s/p ceftriaxone 1/17-1/21   - s/p tamiflu 1/17-1/21    Sepsis/sirs, AHRF possible flash pulmonary edema iso uncontrolled HTN, c/f aspiration pneumonia   - 1/23 s/p RRT am for hypoxia/inc WOB, febrile last night 100.6F and now 101F this am, tachycardia, leukocytosis 11k   - repeat COVID/Flu/RSV with known influenza A-likely shedding   - 1/23 afternoon s/p RRT for inc WOB on AVAPS, s/p intubation and transfer to MICU, required pressor support  - MRSA PCR screen negative   - 1/24 afebrile overnight, WBC uptrending, weaned off pressor earlier in am, on zosyn   - 1/25 CXR with R infiltrate, Bcx remain NGTD, WBC down  - 1/25 Scx with rare Pseudomonas aeruginosa -pansensitive   - MRI brain with metastatic disease, thyroid us with nodule  - 1/26 CT spine with no acute abn of spine, stable postop changes and hardware; noted with multifocal lung abn with bibasilar atelectasis or consolidation and patchy airspace opacities in the RUL  - 1/28 s/p extubation   - 1/30 CT chest with bronchial secretions with complete RML/RLL consolidation/collapse, scattered b/l upper lobe ggo, no RUL mass noted, possible resolving pneumonia   - reportedly h/o penicillin allergy--tolerating zosyn, removed from allergy list   - remains afebrile, WBC wnl, on NC, nontoxic appearing with no new complaints, completed antibiotics 1/30 am     s/p cefepime 1/23  s/p zosyn 1/23-1/30    Recommendations:   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  [facility]  Available on [employer] TEAMS - *PREFERRED*  [phone]  After 5pm on weekdays and all day on weekends - please call [phone]     Thank you for consulting us and involving us in the management of this patients case. In addition to reviewing history, imaging, documents, labs, microbiology, took into account antibiotic stewardship, local antibiogram and infection control strategies and potential transmission issues.

## 2025-02-02 NOTE — PROGRESS NOTE ADULT - SUBJECTIVE AND OBJECTIVE BOX
[facility]EMAT[facility]COL[facility]Y INPATIENT PROGRESS NOTE     Interval Hx:   02-02-25: Mr. Gr was seen at bedside today.    Meds:   MEDICATIONS  (STANDING):  acetaminophen   IVPB .. 1000 milliGRAM(s) IV Intermittent once  acetylcysteine 20%  Inhalation 3 milliLiter(s) Inhalation every 6 hours  albuterol/ipratropium for Nebulization 3 milliLiter(s) Nebulizer every 6 hours  atorvastatin 20 milliGRAM(s) Oral at bedtime  bacitracin   Ointment 1 Application(s) Topical two times a day  chlorhexidine 2% Cloths 1 Application(s) Topical <User Schedule>  chlorhexidine 2% Cloths 1 Application(s) Topical <User Schedule>  escitalopram 15 milliGRAM(s) Oral daily  gabapentin Solution 150 milliGRAM(s) Oral two times a day  heparin   Injectable 7500 Unit(s) SubCutaneous every 8 hours  influenza  Vaccine (HIGH DOSE) 0.5 milliLiter(s) IntraMuscular once  lacosamide IVPB 100 milliGRAM(s) IV Intermittent every 12 hours  lamoTRIgine 100 milliGRAM(s) Oral two times a day  levETIRAcetam  IVPB 750 milliGRAM(s) IV Intermittent two times a day  lidocaine   4% Patch 1 Patch Transdermal daily  lurasidone 40 milliGRAM(s) Oral daily  pantoprazole  Injectable 40 milliGRAM(s) IV Push daily  polyethylene glycol 3350 17 Gram(s) Oral daily  senna 2 Tablet(s) Oral at bedtime  sevelamer carbonate 800 milliGRAM(s) Oral three times a day    MEDICATIONS  (PRN):  nystatin Powder 1 Application(s) Topical three times a day PRN fungal infection you may see and want to treat    Vital Signs Last 24 Hrs  T(C): 36.7 (01 Feb 2025 23:43), Max: 36.9 (01 Feb 2025 08:08)  T(F): 98.1 (01 Feb 2025 23:43), Max: 98.4 (01 Feb 2025 08:08)  HR: 77 (02 Feb 2025 05:38) (75 - 90)  BP: 127/81 (01 Feb 2025 23:43) (127/81 - 156/94)  BP(mean): --  RR: 18 (01 Feb 2025 23:43) (18 - 18)  SpO2: 95% (02 Feb 2025 05:38) (95% - 98%)    Parameters below as of 01 Feb 2025 23:43  Patient On (Oxygen Delivery Method): BiPAP/CPAP    Physical Exam:  Gen: NAD  HEENT: EOMI, MMM  Chest: equal chest rise  Cardiac: regular   Abd: non distended   Neuro: AAOx2    Labs:                        10.1   7.50  )-----------( 254      ( 01 Feb 2025 06:51 )             32.7     CBC Full  -  ( 01 Feb 2025 06:51 )  WBC Count : 7.50 K/uL  RBC Count : 3.15 M/uL  Hemoglobin : 10.1 g/dL  Hematocrit : 32.7 %  Platelet Count - Automated : 254 K/uL  Mean Cell Volume : 103.8 fl  Mean Cell Hemoglobin : 32.1 pg  Mean Cell Hemoglobin Concentration : 30.9 g/dL    02-01    151[H]  |  116[H]  |  101[H]  ----------------------------<  104[H]  4.5   |  20[L]  |  2.96[H]    Ca    10.0      01 Feb 2025 06:51         [facility] INPATIENT PROGRESS NOTE     Interval Hx:   02-02-25: Mr. Gr was seen at bedside today, awake, no overnight events noted, renal function improving, HSV1 of lip positive     Meds:   MEDICATIONS  (STANDING):  acetaminophen   IVPB .. 1000 milliGRAM(s) IV Intermittent once  acetylcysteine 20%  Inhalation 3 milliLiter(s) Inhalation every 6 hours  albuterol/ipratropium for Nebulization 3 milliLiter(s) Nebulizer every 6 hours  atorvastatin 20 milliGRAM(s) Oral at bedtime  bacitracin   Ointment 1 Application(s) Topical two times a day  chlorhexidine 2% Cloths 1 Application(s) Topical <User Schedule>  chlorhexidine 2% Cloths 1 Application(s) Topical <User Schedule>  escitalopram 15 milliGRAM(s) Oral daily  gabapentin Solution 150 milliGRAM(s) Oral two times a day  heparin   Injectable 7500 Unit(s) SubCutaneous every 8 hours  influenza  Vaccine (HIGH DOSE) 0.5 milliLiter(s) IntraMuscular once  lacosamide IVPB 100 milliGRAM(s) IV Intermittent every 12 hours  lamoTRIgine 100 milliGRAM(s) Oral two times a day  levETIRAcetam  IVPB 750 milliGRAM(s) IV Intermittent two times a day  lidocaine   4% Patch 1 Patch Transdermal daily  lurasidone 40 milliGRAM(s) Oral daily  pantoprazole  Injectable 40 milliGRAM(s) IV Push daily  polyethylene glycol 3350 17 Gram(s) Oral daily  senna 2 Tablet(s) Oral at bedtime  sevelamer carbonate 800 milliGRAM(s) Oral three times a day    MEDICATIONS  (PRN):  nystatin Powder 1 Application(s) Topical three times a day PRN fungal infection you may see and want to treat    Vital Signs Last 24 Hrs  T(C): 36.7 (01 Feb 2025 23:43), Max: 36.9 (01 Feb 2025 08:08)  T(F): 98.1 (01 Feb 2025 23:43), Max: 98.4 (01 Feb 2025 08:08)  HR: 77 (02 Feb 2025 05:38) (75 - 90)  BP: 127/81 (01 Feb 2025 23:43) (127/81 - 156/94)  BP(mean): --  RR: 18 (01 Feb 2025 23:43) (18 - 18)  SpO2: 95% (02 Feb 2025 05:38) (95% - 98%)    Parameters below as of 01 Feb 2025 23:43  Patient On (Oxygen Delivery Method): BiPAP/CPAP    Physical Exam:  Gen: NAD  HEENT: EOMI, MMM  Chest: equal chest rise  Cardiac: regular   Abd: non distended   Neuro: AAOx2    Labs:                        10.1   7.50  )-----------( 254      ( 01 Feb 2025 06:51 )             32.7     CBC Full  -  ( 01 Feb 2025 06:51 )  WBC Count : 7.50 K/uL  RBC Count : 3.15 M/uL  Hemoglobin : 10.1 g/dL  Hematocrit : 32.7 %  Platelet Count - Automated : 254 K/uL  Mean Cell Volume : 103.8 fl  Mean Cell Hemoglobin : 32.1 pg  Mean Cell Hemoglobin Concentration : 30.9 g/dL    02-01    151[H]  |  116[H]  |  101[H]  ----------------------------<  104[H]  4.5   |  20[L]  |  2.96[H]    Ca    10.0      01 Feb 2025 06:51

## 2025-02-02 NOTE — PROGRESS NOTE ADULT - SUBJECTIVE AND OBJECTIVE BOX
Date of Service: 02-02-25 @ 15:49    Patient is a 69y old  Male who presents with a chief complaint of seizure, flu, elevated trop (02 Feb 2025 08:04)      Any change in ROS: he is doing pretty good: no sob: on 2 L of oxygen        MEDICATIONS  (STANDING):  acetaminophen   IVPB .. 1000 milliGRAM(s) IV Intermittent once  acetylcysteine 20%  Inhalation 3 milliLiter(s) Inhalation every 6 hours  albuterol/ipratropium for Nebulization 3 milliLiter(s) Nebulizer every 6 hours  atorvastatin 20 milliGRAM(s) Oral at bedtime  bacitracin   Ointment 1 Application(s) Topical two times a day  chlorhexidine 2% Cloths 1 Application(s) Topical <User Schedule>  chlorhexidine 2% Cloths 1 Application(s) Topical <User Schedule>  escitalopram 15 milliGRAM(s) Oral daily  gabapentin Solution 150 milliGRAM(s) Oral two times a day  heparin   Injectable 7500 Unit(s) SubCutaneous every 8 hours  influenza  Vaccine (HIGH DOSE) 0.5 milliLiter(s) IntraMuscular once  lacosamide IVPB 100 milliGRAM(s) IV Intermittent every 12 hours  lamoTRIgine 100 milliGRAM(s) Oral two times a day  levETIRAcetam  IVPB 750 milliGRAM(s) IV Intermittent two times a day  lidocaine   4% Patch 1 Patch Transdermal daily  lurasidone 40 milliGRAM(s) Oral daily  pantoprazole  Injectable 40 milliGRAM(s) IV Push daily  polyethylene glycol 3350 17 Gram(s) Oral daily  senna 2 Tablet(s) Oral at bedtime  sevelamer carbonate 800 milliGRAM(s) Oral three times a day  valACYclovir 1000 milliGRAM(s) Oral every 12 hours    MEDICATIONS  (PRN):  nystatin Powder 1 Application(s) Topical three times a day PRN fungal infection you may see and want to treat    Vital Signs Last 24 Hrs  T(C): 36.9 (02 Feb 2025 10:46), Max: 36.9 (02 Feb 2025 10:46)  T(F): 98.4 (02 Feb 2025 10:46), Max: 98.4 (02 Feb 2025 10:46)  HR: 81 (02 Feb 2025 10:46) (75 - 87)  BP: 130/85 (02 Feb 2025 10:46) (127/81 - 136/88)  BP(mean): --  RR: 18 (02 Feb 2025 10:46) (18 - 18)  SpO2: 98% (02 Feb 2025 10:46) (95% - 98%)    Parameters below as of 02 Feb 2025 10:46  Patient On (Oxygen Delivery Method): nasal cannula  O2 Flow (L/min): 4      I&O's Summary    01 Feb 2025 07:01  -  02 Feb 2025 07:00  --------------------------------------------------------  IN: 0 mL / OUT: 1200 mL / NET: -1200 mL          Physical Exam:   GENERAL: obese  HEENT: BREE/   Atraumatic, Normocephalic  ENMT: No tonsillar erythema, exudates, or enlargement; Moist mucous membranes, Good dentition, No lesions  NECK: Supple, No JVD, Normal thyroid  CHEST/LUNG: Clear to auscultaion  CVS: Regular rate and rhythm; No murmurs, rubs, or gallops  GI: : Soft, Nontender, Nondistended; Bowel sounds present  NERVOUS SYSTEM:  Alert & Oriented X3,  EXTREMITIES:  2+ Peripheral Pulses, No clubbing, cyanosis, or edema  LYMPH: No lymphadenopathy noted  SKIN: No rashes or lesions  ENDOCRINOLOGY: No Thyromegaly  PSYCH: Appropriate    Labs:  24, 21.0, 30.0, 30.0                            10.1   7.50  )-----------( 254      ( 01 Feb 2025 06:51 )             32.7                         9.5    6.09  )-----------( 241      ( 31 Jan 2025 06:59 )             31.8                         9.6    7.16  )-----------( 237      ( 30 Jan 2025 07:15 )             31.2     02-02    145  |  114[H]  |  83[H]  ----------------------------<  156[H]  4.5   |  18[L]  |  2.52[H]  02-01    151[H]  |  116[H]  |  101[H]  ----------------------------<  104[H]  4.5   |  20[L]  |  2.96[H]  01-31    150[H]  |  114[H]  |  108[H]  ----------------------------<  145[H]  4.2   |  22  |  3.46[H]  01-30    147[H]  |  109[H]  |  117[H]  ----------------------------<  135[H]  3.9   |  22  |  4.25[H]    Ca    9.6      02 Feb 2025 12:37  Ca    10.0      01 Feb 2025 06:51    TPro  6.7  /  Alb  3.3  /  TBili  0.3  /  DBili  x   /  AST  55[H]  /  ALT  38  /  AlkPhos  133[H]  02-02  TPro  6.8  /  Alb  3.4  /  TBili  0.3  /  DBili  x   /  AST  50[H]  /  ALT  55[H]  /  AlkPhos  137[H]  01-30    CAPILLARY BLOOD GLUCOSE          LIVER FUNCTIONS - ( 02 Feb 2025 12:37 )  Alb: 3.3 g/dL / Pro: 6.7 g/dL / ALK PHOS: 133 U/L / ALT: 38 U/L / AST: 55 U/L / GGT: x             Urinalysis Basic - ( 02 Feb 2025 12:37 )    Color: x / Appearance: x / SG: x / pH: x  Gluc: 156 mg/dL / Ketone: x  / Bili: x / Urobili: x   Blood: x / Protein: x / Nitrite: x   Leuk Esterase: x / RBC: x / WBC x   Sq Epi: x / Non Sq Epi: x / Bacteria: x      rda< from: CT Chest No Cont (01.30.25 @ 13:37) >    HEART: Heart size is normal. No pericardial effusion.    VISUALIZED UPPER ABDOMEN: Enteric tube terminates in the stomach on    imaging    CHEST WALL AND BONES: Thoracic spinal fusion hardware. No aggressive   osseous lesion. Old right clavicular and bilateral anterior rib fractures.    IMPRESSION:  New secretions at the level of the bronchus intermedius with complete   right middle/lower bilobar consolidation/collapse.    New scattered bilateral upper lobe groundglass opacities, concerning for   infection.    --- End of Report ---    < end of copied text >        RECENT CULTURES:        RESPIRATORY CULTURES:          Studies  Chest X-RAY  CT SCAN Chest   Venous Dopplers: LE:   CT Abdomen  Others

## 2025-02-02 NOTE — PROGRESS NOTE ADULT - ASSESSMENT
69M w/ hx of metastatic testicular cancer (s/p L orchiectomy, on etoposide/cisplatin chemo), epilepsy (grand-mal seizures), schizophrenia, developmental delay, HTN, HLD, VAZQUEZ, stage III CKD, severe obesity, secondary hyperparathyroidism, anemia, thrombocytopenia who presented to ED for fall, possible seizure prior.    Plan:    # Hypoxia: Sepsis/sirs, AHRF possible flash pulmonary edema iso uncontrolled HTN, c/f aspiration pneumonia : Improving  - S/p RRT 1/23 am and 1/23 pm, for inc WOB on AVAPS, s/p intubation and transfer to MICU, required pressor support-> downgraded 1/29  - Sepsis protocol initiated  - CXR w/ mild pulm congestion.  - Diuresis as tolerated  - Thyroid US w/ nodule  - NGT in place, c/w TF's as tolerated-> FEEST -> rec pureed /mod thick liquids   - 1/25 CXR with R infiltrate, Bcx remain NGTD, WBC down  - 1/25 Scx with rare Pseudomonas aeruginosa -pansensitive   - MRI brain with metastatic disease  - 1/28 s/p extubation   - Discontinued zosyn --completed 7d course 1/29  - Monitor off antibiotics per ID rec's  - ID and Pulm following    # Syncopal seizure: Improved, downgraded  - Patient with hx of epilepsy, generalized grand mal seizures  - C/w Lamictal and Keppra  - CTH/C spine/MF negative for acute ICH, notable for mild R hematoma  - 1/17 sp multiple RRTs  for grand mal seizure activity and was initially recommended sepsis workup and antipyretics given T103F, but patient had x2 more episode and was ultimately given ativan 2mg, vimpat load 200mg, and intubated for airway protection with MICU transfer.   - Extubated 1/20  - Keppra 1.5g BID, Lamotrigine 100mg BID, Gabapentin 300mg BID, Vimpat 150mg BID  - Ativan for seizures PRM  - Per Neuro -> MRI brain w and wo contrast to look for potential seizure foci that could cause increased frequency especially given hx of metastatic testicular cancer; deferred MRI given copious secretions   - s/p vEEG, negative for seizures  - Care per MICU-> downgraded 1/21, back to ICU 1/23 for Hypoxia, Increased WOB, Downgraded 1/29    # Brain lesion:  - MRI brain now with 5.4 mm enhancing lesion in the LEFT middle cerebellar peduncle with associated edema suspicious for metastases  - MRI Lumbar negative for acute disease  - Neurology recs noted, new lesion not likely to cause seizure  - Seen by Heme Onc-> will need another PET-CT, can be completed outpatient once stable if concern can proceed with biopsy at that time   - Heme Onc following    # Flu:  - sp Tamiflu completed 5d on 1/22   - Monitor temps/WBC  - ID following    # CKD3: Cr 2.96 on 2/1, pending 2/2 am labs  - Monitor I/O's  - Serial Cr  - Avoid nephrotoxins  - Renally dose medications  - Add free water 250 cc q 4 feeding tube   - Renal following->  Renal fnx improving     # HTN/ HLD:  - C/w CV meds    # Pneumonia:  - Completed ceftriaxone 5d course on 1/21  - Retreated for aspiration pneumonia in ICU on 1/23  - ID following    # Schizophrenia/Depression:  - C/w current meds    # VAZQUEZ:  - CPAP at night    # Hx of metastatic testicular cancer, s/p L orchiectomy, on etoposide/cisplatin chemo, last dose on 6/21/24:  - Outpt Oncology follow-up    # DVT ppx:  - Heparin sq    Dispo: [facility]    [facility]

## 2025-02-02 NOTE — PROGRESS NOTE ADULT - SUBJECTIVE AND OBJECTIVE BOX
SUBJECTIVE / OVERNIGHT EVENTS:     No events noted overnight. RFx improving        --------------------------------------------------------------------------------------------  LABS:                        10.1   7.50  )-----------( 254      ( 01 Feb 2025 06:51 )             32.7     02-01    151[H]  |  116[H]  |  101[H]  ----------------------------<  104[H]  4.5   |  20[L]  |  2.96[H]    Ca    10.0      01 Feb 2025 06:51        CAPILLARY BLOOD GLUCOSE            Urinalysis Basic - ( 01 Feb 2025 06:51 )    Color: x / Appearance: x / SG: x / pH: x  Gluc: 104 mg/dL / Ketone: x  / Bili: x / Urobili: x   Blood: x / Protein: x / Nitrite: x   Leuk Esterase: x / RBC: x / WBC x   Sq Epi: x / Non Sq Epi: x / Bacteria: x        RADIOLOGY & ADDITIONAL TESTS:    Imaging Personally Reviewed:  [x] YES  [ ] NO    Consultant(s) Notes Reviewed:  [x] YES  [ ] NO    MEDICATIONS  (STANDING):  acetaminophen   IVPB .. 1000 milliGRAM(s) IV Intermittent once  acetylcysteine 20%  Inhalation 3 milliLiter(s) Inhalation every 6 hours  albuterol/ipratropium for Nebulization 3 milliLiter(s) Nebulizer every 6 hours  atorvastatin 20 milliGRAM(s) Oral at bedtime  bacitracin   Ointment 1 Application(s) Topical two times a day  chlorhexidine 2% Cloths 1 Application(s) Topical <User Schedule>  chlorhexidine 2% Cloths 1 Application(s) Topical <User Schedule>  escitalopram 15 milliGRAM(s) Oral daily  gabapentin Solution 150 milliGRAM(s) Oral two times a day  heparin   Injectable 7500 Unit(s) SubCutaneous every 8 hours  influenza  Vaccine (HIGH DOSE) 0.5 milliLiter(s) IntraMuscular once  lacosamide IVPB 100 milliGRAM(s) IV Intermittent every 12 hours  lamoTRIgine 100 milliGRAM(s) Oral two times a day  levETIRAcetam  IVPB 750 milliGRAM(s) IV Intermittent two times a day  lidocaine   4% Patch 1 Patch Transdermal daily  lurasidone 40 milliGRAM(s) Oral daily  pantoprazole  Injectable 40 milliGRAM(s) IV Push daily  polyethylene glycol 3350 17 Gram(s) Oral daily  senna 2 Tablet(s) Oral at bedtime  sevelamer carbonate 800 milliGRAM(s) Oral three times a day    MEDICATIONS  (PRN):  nystatin Powder 1 Application(s) Topical three times a day PRN fungal infection you may see and want to treat      Care Discussed with Consultants/Other Providers [x] YES  [ ] NO    Vital Signs Last 24 Hrs  T(C): 36.7 (01 Feb 2025 23:43), Max: 36.9 (01 Feb 2025 08:08)  T(F): 98.1 (01 Feb 2025 23:43), Max: 98.4 (01 Feb 2025 08:08)  HR: 77 (02 Feb 2025 05:38) (75 - 90)  BP: 127/81 (01 Feb 2025 23:43) (127/81 - 156/94)  BP(mean): --  RR: 18 (01 Feb 2025 23:43) (18 - 18)  SpO2: 95% (02 Feb 2025 05:38) (95% - 98%)    Parameters below as of 01 Feb 2025 23:43  Patient On (Oxygen Delivery Method): BiPAP/CPAP      I&O's Summary    01 Feb 2025 07:01  -  02 Feb 2025 07:00  --------------------------------------------------------  IN: 0 mL / OUT: 1200 mL / NET: -1200 mL        PHYSICAL EXAM:  GENERAL: NAD,   HEAD:  Atraumatic, Normocephalic,  EYES: EOMI, PERRLA, conjunctiva and sclera clear  NECK: Supple, No JVD  CHEST/LUNG: Diminished bilaterally; No wheeze  HEART: Regular rate and rhythm; No murmurs, rubs, or gallops  ABDOMEN: Soft, Nontender, Nondistended; Bowel sounds present  NEURO: AAOx3, no focal weakness, 5/5 b/l extremity strength, b/l knee no arthritis, no effusion   EXTREMITIES:  2+ Peripheral Pulses, No clubbing, cyanosis, or edema  SKIN: No rashes or lesions

## 2025-02-02 NOTE — PROGRESS NOTE ADULT - PROBLEM SELECTOR PLAN 1
-S/p intubation in MICU in the setting of grand mal seizures, pneumonia, Flu  -Completed course of ABX, tamiflu for Flu. Extubated to AVAPS 1/19  -S/p RRT 1/23 AM for fevers, hypoxia requiring AVAPS. Likely aspiration event +/- flash pulmonary edema  -S/p 2nd RRT 1/23 PM for increased WOB on AVAPS, intubated and tx to MICU   -Extubated to AVAPS 1/28, now using qHS   -Continue AVAPS  ePAP 5 Rate 14 Max pressure 35 Min Pressure 15 fio2 40% qHS and PRN for increased WOB for now. Pt has CPAP at home for VAZQUEZ, will need to monitor off AVAPs prior to discharge.  -Keep sats >90% with O2 PRN. Wean o2 as tolerated.   -CXR 1/29 with low lung volumes  -Repeat CT chest with bronchial secretions, RML/RLL atelectasis +/- underlying infiltrate, scattered b/l upper lobe GGO. ABX completed, ID f/u.   -Continue bronchodilators. Start Mucomyst 20% 3ml q6h x3 days (give with Duoneb)  -Chest PT   -ABG with no Co2 retention.    2/2: seems OK:  has rll atelectasis:  hopefully will improve on its own so that bronc doesnto have to be dne:  cont conservative rx for now:

## 2025-02-02 NOTE — PROGRESS NOTE ADULT - SUBJECTIVE AND OBJECTIVE BOX
[facility]   [phone]  RENAL FOLLOW UP NOTE  --------------------------------------------------------------------------------  Chief Complaint:    24 hour events/subjective:    seen earlier   denies worsening sob     PAST HISTORY  --------------------------------------------------------------------------------  No significant changes to PMH, PSH, FHx, SHx, unless otherwise noted    ALLERGIES & MEDICATIONS  --------------------------------------------------------------------------------  Allergies    penicillin (Hives)  seasonal allergies (Unknown)    Intolerances    latex (Rash)    Standing Inpatient Medications  acetaminophen   IVPB .. 1000 milliGRAM(s) IV Intermittent once  acetylcysteine 20%  Inhalation 3 milliLiter(s) Inhalation every 6 hours  albuterol/ipratropium for Nebulization 3 milliLiter(s) Nebulizer every 6 hours  atorvastatin 20 milliGRAM(s) Oral at bedtime  bacitracin   Ointment 1 Application(s) Topical two times a day  chlorhexidine 2% Cloths 1 Application(s) Topical <User Schedule>  chlorhexidine 2% Cloths 1 Application(s) Topical <User Schedule>  escitalopram 15 milliGRAM(s) Oral daily  gabapentin Solution 150 milliGRAM(s) Oral two times a day  heparin   Injectable 7500 Unit(s) SubCutaneous every 8 hours  influenza  Vaccine (HIGH DOSE) 0.5 milliLiter(s) IntraMuscular once  lacosamide IVPB 100 milliGRAM(s) IV Intermittent every 12 hours  lamoTRIgine 100 milliGRAM(s) Oral two times a day  levETIRAcetam  IVPB 750 milliGRAM(s) IV Intermittent two times a day  lidocaine   4% Patch 1 Patch Transdermal daily  lurasidone 40 milliGRAM(s) Oral daily  pantoprazole  Injectable 40 milliGRAM(s) IV Push daily  polyethylene glycol 3350 17 Gram(s) Oral daily  senna 2 Tablet(s) Oral at bedtime  sevelamer carbonate 800 milliGRAM(s) Oral three times a day  valACYclovir 1000 milliGRAM(s) Oral every 12 hours    PRN Inpatient Medications  nystatin Powder 1 Application(s) Topical three times a day PRN      REVIEW OF SYSTEMS  --------------------------------------------------------------------------------    Gen: denies  fevers/chills, or HA or dizziness   CVS: denies chest pain/palpitations  Resp: denies worsening SOB/Cough  GI: Denies N/V/Abd pain  : Denies dysuria    VITALS/PHYSICAL EXAM  --------------------------------------------------------------------------------  T(C): 36.8 (02-02-25 @ 15:59), Max: 36.9 (02-02-25 @ 10:46)  HR: 100 (02-02-25 @ 15:59) (75 - 100)  BP: 134/83 (02-02-25 @ 15:59) (127/81 - 134/83)  RR: 18 (02-02-25 @ 15:59) (18 - 18)  SpO2: 96% (02-02-25 @ 15:59) (95% - 98%)  Wt(kg): --        02-01-25 @ 07:01  -  02-02-25 @ 07:00  --------------------------------------------------------  IN: 0 mL / OUT: 1200 mL / NET: -1200 mL    02-02-25 @ 07:01  -  02-02-25 @ 22:03  --------------------------------------------------------  IN: 0 mL / OUT: 1175 mL / NET: -1175 mL      Physical Exam:  	    Gen: comfortable appearing  on O2   	no jvd  	Pulm: decrease bs  no rales  +  ronchi  no wheezing  	CV: tachy  S1S2; no rub  	Abd: hypoactive bs soft distended  	UE: Warm, no cyanosis  no clubbing,  no edema;   	LE: Warm, no cyanosis  no clubbing, no edema  	Neuro:  alert and oriented      LABS/STUDIES  --------------------------------------------------------------------------------              10.1   7.50  >-----------<  254      [02-01-25 @ 06:51]              32.7     145  |  114  |  83  ----------------------------<  156      [02-02-25 @ 12:37]  4.5   |  18  |  2.52        Ca     9.6     [02-02-25 @ 12:37]    TPro  6.7  /  Alb  3.3  /  TBili  0.3  /  DBili  x   /  AST  55  /  ALT  38  /  AlkPhos  133  [02-02-25 @ 12:37]          Creatinine Trend:  SCr 2.52 [02-02 @ 12:37]  SCr 2.96 [02-01 @ 06:51]  SCr 3.46 [01-31 @ 06:59]  SCr 4.25 [01-30 @ 07:12]  SCr 4.71 [01-29 @ 11:56]

## 2025-02-02 NOTE — PROGRESS NOTE ADULT - ASSESSMENT
69M w/ hx of metastatic testicular cancer (s/p L orchiectomy, on etoposide/cisplatin chemo, last dose 6/2024), epilepsy (grand-mal seizures), schizophrenia, developmental delay, HTN, HLD, VAZQUEZ, stage III CKD, obesity, secondary hyperparathyroidism, anemia, thrombocytopenia, ? spinal surgery who was admitted on 1/16/25 for fall and syncope. On 1/17 patient had multiple RRTs called for grand mal seizure activity and  patient had x2 more episode and was ultimately given ativan 2mg, Vimpat load 200mg, and intubated for airway protection with MICU transfer. Extubated 1/19 - to AVAPS, post extubation with increased secretion, now better; downgraded 1/21; While on medical floors; patient developed respiratory distress; now intubated for suspected aspiration pna vs flash pulmonary edema. Extubated 1/28/25.    Problem/Plan #1: Acute Hypoxic Respiratory Failure  - Intubated for airway protection i/s/o multiple RRTs for grand mal seizure activity, Extubated 1/19 to AVAPS  - Reintubated 1/23 after hypoxic episode for suspected aspiration pna vs flash pulmonary edema.   - BNP 3949  - CXR shows mild pulm edema on 1/16; CXR 1/25  Bibasilar atelectasis. Mild interstitial edema.. Improved aeration in the right upper lobe  - TTE with preserved EF, no WMA, dilated aortic root  - Was on Bumex IV - stopped on 1/25 due to ^ creatinine 4.31 from 3.92  - CT chest 1/30 - concerning for infection, complete right middle/lower lobe consolidation/collapse; no pleural or pericardial effusion; pulm & ID following  - Check proBNP 1/31 1134 (down from 3949 on 1/23)    Problem/Plan #2: Hypertensive Urgency  - BP now stable off anti-hypertensives. Will slowly re-implement as BP recovers.    Problem/Plan #3: HLD  - c/w atorvastatin 20mg daily    Problem/Plan #4: DVT Ppx  - c/w SQ heparin      Dr. Andrew to follow     Betsey Tran MD [facility]  Attending Interventional Cardiologist, [email or web address]/[email or web address].   [employer] on [employer]

## 2025-02-02 NOTE — PROGRESS NOTE ADULT - SUBJECTIVE AND OBJECTIVE BOX
Cardiology Attending Follow-up Note     Patient seen and examined at bedside.    Overnight Events:     no events     REVIEW OF SYSTEMS:  Constitutional:     [x ] negative [ ] fevers [ ] chills [ ] weight loss [ ] weight gain  HEENT:                  [x ] negative [ ] dry eyes [ ] eye irritation [ ] postnasal drip [ ] nasal congestion  CV:                         [ x] negative  [ ] chest pain [ ] orthopnea [ ] palpitations [ ] murmur  Resp:                     [ x] negative [ ] cough [ ] shortness of breath [ ] dyspnea [ ] wheezing [ ] sputum [ ]hemoptysis  GI:                          [ x] negative [ ] nausea [ ] vomiting [ ] diarrhea [ ] constipation [ ] abd pain [ ] dysphagia   :                        [ x] negative [ ] dysuria [ ] nocturia [ ] hematuria [ ] increased urinary frequency  Musculoskeletal: [ x] negative [ ] back pain [ ] myalgias [ ] arthralgias [ ] fracture  Skin:                       [ x] negative [ ] rash [ ] itch  Neurological:        [x ] negative [ ] headache [ ] dizziness [ ] syncope [ ] weakness [ ] numbness  Psychiatric:           [ x] negative [ ] anxiety [ ] depression  Endocrine:            [ x] negative [ ] diabetes [ ] thyroid problem  Heme/Lymph:      [ x] negative [ ] anemia [ ] bleeding problem  Allergic/Immune: [ x] negative [ ] itchy eyes [ ] nasal discharge [ ] hives [ ] angioedema    [ x] All other systems negative  [ ] Unable to assess ROS due to    Current Meds:  acetaminophen   IVPB .. 1000 milliGRAM(s) IV Intermittent once  acetylcysteine 20%  Inhalation 3 milliLiter(s) Inhalation every 6 hours  albuterol/ipratropium for Nebulization 3 milliLiter(s) Nebulizer every 6 hours  atorvastatin 20 milliGRAM(s) Oral at bedtime  bacitracin   Ointment 1 Application(s) Topical two times a day  chlorhexidine 2% Cloths 1 Application(s) Topical <User Schedule>  chlorhexidine 2% Cloths 1 Application(s) Topical <User Schedule>  escitalopram 15 milliGRAM(s) Oral daily  gabapentin Solution 150 milliGRAM(s) Oral two times a day  heparin   Injectable 7500 Unit(s) SubCutaneous every 8 hours  influenza  Vaccine (HIGH DOSE) 0.5 milliLiter(s) IntraMuscular once  lacosamide IVPB 100 milliGRAM(s) IV Intermittent every 12 hours  lamoTRIgine 100 milliGRAM(s) Oral two times a day  levETIRAcetam  IVPB 750 milliGRAM(s) IV Intermittent two times a day  lidocaine   4% Patch 1 Patch Transdermal daily  lurasidone 40 milliGRAM(s) Oral daily  nystatin Powder 1 Application(s) Topical three times a day PRN  pantoprazole  Injectable 40 milliGRAM(s) IV Push daily  polyethylene glycol 3350 17 Gram(s) Oral daily  senna 2 Tablet(s) Oral at bedtime  sevelamer carbonate 800 milliGRAM(s) Oral three times a day  valACYclovir 1000 milliGRAM(s) Oral every 12 hours      PAST MEDICAL & SURGICAL HISTORY:  Hypertension, unspecified type      Other hyperlipidemia      History of epilepsy      Paranoid schizophrenia      Obstructive sleep apnea      Testicular cancer      H/O Spinal surgery          Vitals:  T(F): 98.2 (02-02), Max: 98.4 (02-02)  HR: 87 (02-02) (77 - 100)  BP: 134/83 (02-02) (130/85 - 134/83)  RR: 18 (02-02)  SpO2: 97% (02-02)  I&O's Summary    01 Feb 2025 07:01  -  02 Feb 2025 07:00  --------------------------------------------------------  IN: 0 mL / OUT: 1200 mL / NET: -1200 mL    02 Feb 2025 07:01  -  03 Feb 2025 00:28  --------------------------------------------------------  IN: 0 mL / OUT: 1175 mL / NET: -1175 mL        Physical Exam:  Appearance: No acute distress  HENT: No JVD   Cardiovascular: RRR, S1/S2, no murmurs  Respiratory: CTABL  Gastrointestinal: soft, NT ND, +BS  Musculoskeletal: No clubbing, no edema   Neurologic: Non-focal  Skin: No rashes, ecchymoses, or cyanosis                          10.1   7.50  )-----------( 254      ( 01 Feb 2025 06:51 )             32.7     02-02    145  |  114[H]  |  83[H]  ----------------------------<  156[H]  4.5   |  18[L]  |  2.52[H]    Ca    9.6      02 Feb 2025 12:37    TPro  6.7  /  Alb  3.3  /  TBili  0.3  /  DBili  x   /  AST  55[H]  /  ALT  38  /  AlkPhos  133[H]  02-02                  Cardiovascular Testings:

## 2025-02-02 NOTE — PROGRESS NOTE ADULT - ASSESSMENT
Mr. Gr is a 69 year old male with PMHx of seizure disorder, sleep apnea, HTN, chronic idiopathic constipation, stage III CKD, severe obesity, secondary hyperparathyroidism, anemia, thrombocytopenia, hyperlipidemia, testicular cancer s/p EP x 4 under the care of Dr. Ovalles who is admitted s/p fall in setting of possible seizure since 01/16/2025. He was intubated early in his admission due to seziures and extubated 1/21/2025 and then again intubated in setting of respiratory distress, requiring diuresis, with SHAGUFTA on CKD, in the MICU. Imaging shows possible brain metastatic disease and lung mass as well as abnormal thyroid nodule.     Former smoker, quit 14y prior, denied ETOH, drug use. Lives at home. Does not have children. Denied family history of blood disorders or malignancy.  Denied previous workplace related exposures.  Denied previous history of blood transfusions.  Denied history of thromboses.  Denied history of  requiring anticoagulation.        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  Margins were negative.  pT3Nx. CT C/A/P with few left para-aortic and left iliac chain lymph nodes largest measuring 8.1 cm left para-aortic node, bilateral subcentimeter noncalcified pulmonary nodules measuring up to 7 mm. AFP, LDH, hCG were all elevated. 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 Completed four cycles of adjuvant therapy with Cisplatin+Etoposide, with cisplatin dose reduced 50% and Etoposide 50% due to thrombocytopenia at that time. Subsequent scans 08/2024 showed resolution of disease and negative markers,         01/26/2025: continues intubated and sedated in the ICU, discussed with ICU team yesterday, pending markers, endocrinology eval noted     01/27/2025:  AFP/BHCG normal,     01/28/2025: continues in MICU intubated, off sedation, Neurology recs noted, L cerebellar lesion not likely be cause of seizure, GOC noted per palliative discussion, pts primary Oncologist aware of current events as well     01/29/2025: , awake and alert, extubated 01/28/2025, continues in MICU     01/30/2025: now on floor, bipap, no overnight events noted    01/31/2025:  repeat CT chest w/o contrast noted with new secretions at the level of the bronchus intermedius with complete right middle/lower bilobar consolidation/collapse and new scattered bilateral upper lobe groundglass opacities, concerning for infection. Discussed with pts wife and discussed with primary Oncologist Dr. Ovalles, agree with current plan    02/01/2025:  no overnight events noted, recapped hospitalization, aware of outpatient plan once he is stable, no signs of bleeding       # Brain lesion  # Seizures  - Seizures noted, pt with hx of seizures  - MRI brain now with 5.4 mm enhancing lesion in the LEFT middle cerebellar peduncle with associated edema suspicious for metastases  - MRI Lumbar negative for acute disease  - Small lesion without mass effect or edema, recommended to correlate per neurology if foci and locus are concordant with seizure activity  - Neurology recs noted, new lesion not likely to cause seizure  - It is rare, but nonetheless not impossible, for testicular cancer to be metastatic to the brain  - He will need another PET-CT, can be completed outpatient once stable if concern can proceed with biopsy at that time   - Endocrinology eval for thyroid nodule  - AFP/BHCG normal,      # Thyroid nodule  - US Thyroid 01/24/2025 with 1.6cm complex mixed solid cystic hypoechoic nodule in the lower pole of the right thyroid lobe, TIRADS 5.  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 Ovalles 12/03/2024, markers continued to be negative  - See above in regards to brain lesion  - MRI Neck shows 4.2 cm RIGHT upper lobe mass/infiltrate  - Recommend IR guided biopsy of RUL mass if PET-CT concordant/suggestive of malignancy, PET-CT as outpt and then consideration after better characterization   - Repeat CT chest w/o contrast noted with new secretions at the level of the bronchus intermedius with complete right middle/lower bilobar consolidation/collapse and new scattered bilateral upper lobe groundglass opacities, concerning for infection  - Discussed with pts wife and discussed with primary Oncologist Dr. Ovalles, agree with current plan  - Follow up as outpatient with Franki Ovalles MD     # Thrombocytopenia  - Labile  - Coags wnl, LFTs elevated  - Continue to trend  - Transfuse to maintain >10k, if actively bleeding then maintain >50k     # Acute kidney injury on CKD Stage IIIA  - Worsening    - Likely multifactorial at this point  - Nephrology consult and recs noted  - Continue to trend     # Leukocytosis, Neutrophilia  - Likely reactive, resolved  - Continue to trend     # Normocytic anemia  - Chronic, has hx of PRBC during chemo 07/2024  - Noted Anti E, Anti-K Anti-C antibodies previously  - Monitor for bleeding  - Recommend to transfuse to maintain Hb > 7       Recommendations:   - Follow ID recs   - Follow pulmonary recs  - PET-CT as outpatient  - Follow up with Endocrinology as outpt given thyroid nodule   - Biopsy as outpatient if RUL lesion concerning  - Should follow up with neurosurgery and neurology as outpatient well given lesion in the brain   - Continue to monitor H/H daily   - Continue to monitor renal function and Nephrology recs    Thank you for allowing me to participate in the care Mr. Gr, please do not hesitate to call or text me if you have further questions or concerns.        Brayan Mart MD  [facility]   Division of Hematology/Oncology  [facility], [address]  [place] [address]  P: [phone]  F: [phone]    Attestation:    ----Pt evalulated including face-to-face interaction in addition to chart review, reviewing treatment plan, and managing the patient’s chronic diagnoses as listed in the assessment----   Mr. Gr is a 69 year old male with PMHx of seizure disorder, sleep apnea, HTN, chronic idiopathic constipation, stage III CKD, severe obesity, secondary hyperparathyroidism, anemia, thrombocytopenia, hyperlipidemia, testicular cancer s/p EP x 4 under the care of Dr. Ovalles who is admitted s/p fall in setting of possible seizure since 01/16/2025. He was intubated early in his admission due to seziures and extubated 1/21/2025 and then again intubated in setting of respiratory distress, requiring diuresis, with SHAGUFTA on CKD, in the MICU. Imaging shows possible brain metastatic disease and lung mass as well as abnormal thyroid nodule.     Former smoker, quit 14y prior, denied ETOH, drug use. Lives at home. Does not have children. Denied family history of blood disorders or malignancy.  Denied previous workplace related exposures.  Denied previous history of blood transfusions.  Denied history of thromboses.  Denied history of  requiring anticoagulation.        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  Margins were negative.  pT3Nx. CT C/A/P with few left para-aortic and left iliac chain lymph nodes largest measuring 8.1 cm left para-aortic node, bilateral subcentimeter noncalcified pulmonary nodules measuring up to 7 mm. AFP, LDH, hCG were all elevated. 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 Completed four cycles of adjuvant therapy with Cisplatin+Etoposide, with cisplatin dose reduced 50% and Etoposide 50% due to thrombocytopenia at that time. Subsequent scans 08/2024 showed resolution of disease and negative markers,         01/26/2025: continues intubated and sedated in the ICU, discussed with ICU team yesterday, pending markers, endocrinology eval noted     01/27/2025:  AFP/BHCG normal,     01/28/2025: continues in MICU intubated, off sedation, Neurology recs noted, L cerebellar lesion not likely be cause of seizure, GOC noted per palliative discussion, pts primary Oncologist aware of current events as well     01/29/2025: , awake and alert, extubated 01/28/2025, continues in MICU     01/30/2025: now on floor, bipap, no overnight events noted    01/31/2025:  repeat CT chest w/o contrast noted with new secretions at the level of the bronchus intermedius with complete right middle/lower bilobar consolidation/collapse and new scattered bilateral upper lobe groundglass opacities, concerning for infection. Discussed with pts wife and discussed with primary Oncologist Dr. Ovalles, agree with current plan    02/01/2025:  no overnight events noted, recapped hospitalization, aware of outpatient plan once he is stable, no signs of bleeding     02/02/2025:  renal function improving, HSV1 of lip positive         # Brain lesion  # Seizures  - Seizures noted, pt with hx of seizures  - MRI brain now with 5.4 mm enhancing lesion in the LEFT middle cerebellar peduncle with associated edema suspicious for metastases  - MRI Lumbar negative for acute disease  - Small lesion without mass effect or edema, recommended to correlate per neurology if foci and locus are concordant with seizure activity  - Neurology recs noted, new lesion not likely to cause seizure  - It is rare, but nonetheless not impossible, for testicular cancer to be metastatic to the brain  - He will need another PET-CT, can be completed outpatient once stable if concern can proceed with biopsy at that time   - Endocrinology eval for thyroid nodule  - AFP/BHCG normal,      # Thyroid nodule  - US Thyroid 01/24/2025 with 1.6cm complex mixed solid cystic hypoechoic nodule in the lower pole of the right thyroid lobe, TIRADS 5.  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 Ovalles 12/03/2024, markers continued to be negative  - See above in regards to brain lesion  - MRI Neck shows 4.2 cm RIGHT upper lobe mass/infiltrate  - Recommend IR guided biopsy of RUL mass if PET-CT concordant/suggestive of malignancy, PET-CT as outpt and then consideration after better characterization   - Repeat CT chest w/o contrast noted with new secretions at the level of the bronchus intermedius with complete right middle/lower bilobar consolidation/collapse and new scattered bilateral upper lobe groundglass opacities, concerning for infection  - Discussed with pts wife and discussed with primary Oncologist Dr. 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 Gr, please do not hesitate to call or text me if you have further questions or concerns.        Brayan Mart MD  [facility] [place]   Division of Hematology/Oncology  [facility], [address]  [place] [address]  P: [phone]  F: [phone]    Attestation:    ----Pt evalulated including face-to-face interaction in addition to chart review, reviewing treatment plan, and managing the patient’s chronic diagnoses as listed in the assessment----

## 2025-02-02 NOTE — PROGRESS NOTE ADULT - SUBJECTIVE AND OBJECTIVE BOX
1st attempt tried to call pt to sched stress test at [facility], # is disconnected.    [facility]  JACINTO Horton Y. Patel, S. Shah, G. Casimir  [phone]  ([phone] - weekdays after 5pm and weekends)    Name: OSCAR MILAN  Age/Gender: 69y Male  MRN: [id number]    Interval History:  Patient seen and examined this morning.   No new complaints noted.  Notes reviewed  No concerning overnight events  Afebrile   Allergies: penicillin (Hives)  seasonal allergies (Unknown)      Objective:  Vitals:   T(F): 98.2 (02-02-25 @ 15:59), Max: 98.4 (02-02-25 @ 10:46)  HR: 100 (02-02-25 @ 15:59) (75 - 100)  BP: 134/83 (02-02-25 @ 15:59) (127/81 - 134/83)  RR: 18 (02-02-25 @ 15:59) (18 - 18)  SpO2: 96% (02-02-25 @ 15:59) (95% - 98%)  Physical Examination:  General: no acute distress, NC  HEENT: NCAT, anicteric, lip lesions  Respiratory: decreased breath sounds b/l   Cardiovascular: S1 and S2 present, normal rate  Gastrointestinal: obese, nondistended  Extremities: no edema, no cyanosis  Skin: no visible rash    Laboratory Studies:  CBC:                       10.1   7.50  )-----------( 254      ( 01 Feb 2025 06:51 )             32.7     WBC Trend:  7.50 02-01-25 @ 06:51  6.09 01-31-25 @ 06:59  7.16 01-30-25 @ 07:15  7.01 01-29-25 @ 00:40  6.50 01-28-25 @ 00:17  6.98 01-27-25 @ 00:32    CMP: 02-02    145  |  114[H]  |  83[H]  ----------------------------<  156[H]  4.5   |  18[L]  |  2.52[H]    Ca    9.6      02 Feb 2025 12:37    TPro  6.7  /  Alb  3.3  /  TBili  0.3  /  DBili  x   /  AST  55[H]  /  ALT  38  /  AlkPhos  133[H]  02-02    Creatinine: 2.52 mg/dL (02-02-25 @ 12:37)  Creatinine: 2.96 mg/dL (02-01-25 @ 06:51)  Creatinine: 3.46 mg/dL (01-31-25 @ 06:59)  Creatinine: 4.25 mg/dL (01-30-25 @ 07:12)  Creatinine: 4.71 mg/dL (01-29-25 @ 11:56)  Creatinine: 5.08 mg/dL (01-29-25 @ 00:39)  Creatinine: 4.86 mg/dL (01-28-25 @ 15:25)  Creatinine: 4.80 mg/dL (01-28-25 @ 00:17)  Creatinine: 4.68 mg/dL (01-27-25 @ 00:32)    LIVER FUNCTIONS - ( 02 Feb 2025 12:37 )  Alb: 3.3 g/dL / Pro: 6.7 g/dL / ALK PHOS: 133 U/L / ALT: 38 U/L / AST: 55 U/L / GGT: x           Microbiology: reviewed   Culture - Sputum (collected 01-25-25 @ 07:21)  Source: .Sputum Sputum  Gram Stain (01-26-25 @ 23:12):    Numerous polymorphonuclear leukocytes per low power field    Rare Squamous epithelial cells per low power field    No organisms seen  Final Report (01-27-25 @ 17:11):    Rare Pseudomonas aeruginosa    Commensal lucia consistent with body site  Organism: Pseudomonas aeruginosa (01-27-25 @ 17:11)  Organism: Pseudomonas aeruginosa (01-27-25 @ 17:11)      Method Type: MARIELENA      -  Aztreonam: S <=4      -  Cefepime: S <=2      -  Ceftazidime: S 4      -  Ciprofloxacin: S <=0.25      -  Imipenem: S 2      -  Levofloxacin: S <=0.5      -  Meropenem: S <=1      -  Piperacillin/Tazobactam: S <=8    Culture - Urine (collected 01-24-25 @ 17:16)  Source: Catheterized Catheterized  Final Report (01-26-25 @ 02:35):    No growth    Culture - Blood (collected 01-23-25 @ 15:14)  Source: .Blood BLOOD  Final Report (01-28-25 @ 20:00):    No growth at 5 days    Culture - Blood (collected 01-23-25 @ 15:14)  Source: .Blood BLOOD  Final Report (01-28-25 @ 20:00):    No growth at 5 days    Culture - Blood (collected 01-23-25 @ 12:21)  Source: .Blood BLOOD  Final Report (01-28-25 @ 17:00):    No growth at 5 days    Culture - Blood (collected 01-23-25 @ 12:21)  Source: .Blood BLOOD  Final Report (01-28-25 @ 17:00):    No growth at 5 days    Culture - Blood (collected 01-23-25 @ 00:15)  Source: .Blood BLOOD  Final Report (01-28-25 @ 04:01):    No growth at 5 days    Culture - Blood (collected 01-23-25 @ 00:10)  Source: .Blood BLOOD  Final Report (01-28-25 @ 04:01):    No growth at 5 days    Culture - Blood (collected 01-20-25 @ 01:52)  Source: .Blood BLOOD  Final Report (01-25-25 @ 09:00):    No growth at 5 days    Radiology: reviewed     Medications:  acetaminophen   IVPB .. 1000 milliGRAM(s) IV Intermittent once  acetylcysteine 20%  Inhalation 3 milliLiter(s) Inhalation every 6 hours  albuterol/ipratropium for Nebulization 3 milliLiter(s) Nebulizer every 6 hours  atorvastatin 20 milliGRAM(s) Oral at bedtime  bacitracin   Ointment 1 Application(s) Topical two times a day  chlorhexidine 2% Cloths 1 Application(s) Topical <User Schedule>  chlorhexidine 2% Cloths 1 Application(s) Topical <User Schedule>  escitalopram 15 milliGRAM(s) Oral daily  gabapentin Solution 150 milliGRAM(s) Oral two times a day  heparin   Injectable 7500 Unit(s) SubCutaneous every 8 hours  influenza  Vaccine (HIGH DOSE) 0.5 milliLiter(s) IntraMuscular once  lacosamide IVPB 100 milliGRAM(s) IV Intermittent every 12 hours  lamoTRIgine 100 milliGRAM(s) Oral two times a day  levETIRAcetam  IVPB 750 milliGRAM(s) IV Intermittent two times a day  lidocaine   4% Patch 1 Patch Transdermal daily  lurasidone 40 milliGRAM(s) Oral daily  nystatin Powder 1 Application(s) Topical three times a day PRN  pantoprazole  Injectable 40 milliGRAM(s) IV Push daily  polyethylene glycol 3350 17 Gram(s) Oral daily  senna 2 Tablet(s) Oral at bedtime  sevelamer carbonate 800 milliGRAM(s) Oral three times a day  valACYclovir 1000 milliGRAM(s) Oral every 12 hours    Current Antimicrobials:  valACYclovir 1000 milliGRAM(s) Oral every 12 hours    Prior/Completed Antimicrobials:  cefTRIAXone   IVPB  cefTRIAXone   IVPB  oseltamivir  piperacillin/tazobactam IVPB..  vancomycin  IVPB

## 2025-02-02 NOTE — PROGRESS NOTE ADULT - ASSESSMENT
70 y/o M with PMH of metastatic testicular cancer (s/p L orchiectomy, on etoposide/cisplatin chemo, last dose 6/2024), epilepsy (grand-mal seizures), schizophrenia, developmental delay, HTN, HLD, VAZQUEZ, stage III CKD, severe obesity, secondary hyperparathyroidism, anemia, thrombocytopenia who presented to ED for fall, possible seizure prior. On 1/17 patient had multiple RRTs called for grand mal seizure activity and  patient had x2 more episode and was ultimately given ativan 2mg, Vimpat load 200mg, and intubated for airway protection with MICU transfer. Extubated 1/19 - to AVAPS, post extubation with increased secretion. Downgraded to medical floor 1/21. Pt still with fevers, s/p RRT 1/23 AM for fever, hypoxia.  AVAPS - O2 sats 98% but tachypneic to 40, tachycardic, pt endorsing SOB. RRT ---> intubated for respiratory failure. also febrile and Hypertensive. CKD      1- CKD III  with SHAGUFTA   2- CHF   3- fevers  4- tachycardia  5- respiratory failure s/p extubation   6- HTN   7- hypernatremia     SHAGUFTA suspect ischemic ATN in setting of infection and hypotension   fluid status improved based on pocus by the primary team   off diuretics now     bun is improving   cr is improving   non oliguric   not uremic   na is improving   bronchodilators   still hypoxemic and cont O2 pulm following   hyperphosphatemia renvela 1 tab tid   d.w pt sister at bedside in detail

## 2025-02-03 LAB
ALBUMIN SERPL ELPH-MCNC: 3.5 G/DL — SIGNIFICANT CHANGE UP (ref 3.3–5)
ALP SERPL-CCNC: 128 U/L — HIGH (ref 40–120)
ALT FLD-CCNC: 40 U/L — SIGNIFICANT CHANGE UP (ref 10–45)
ANION GAP SERPL CALC-SCNC: 14 MMOL/L — SIGNIFICANT CHANGE UP (ref 5–17)
AST SERPL-CCNC: 52 U/L — HIGH (ref 10–40)
BILIRUB SERPL-MCNC: 0.4 MG/DL — SIGNIFICANT CHANGE UP (ref 0.2–1.2)
BUN SERPL-MCNC: 69 MG/DL — HIGH (ref 7–23)
CALCIUM SERPL-MCNC: 9.8 MG/DL — SIGNIFICANT CHANGE UP (ref 8.4–10.5)
CHLORIDE SERPL-SCNC: 114 MMOL/L — HIGH (ref 96–108)
CO2 SERPL-SCNC: 20 MMOL/L — LOW (ref 22–31)
CREAT SERPL-MCNC: 2.12 MG/DL — HIGH (ref 0.5–1.3)
EGFR: 33 ML/MIN/1.73M2 — LOW
GLUCOSE BLDC GLUCOMTR-MCNC: 139 MG/DL — HIGH (ref 70–99)
GLUCOSE SERPL-MCNC: 93 MG/DL — SIGNIFICANT CHANGE UP (ref 70–99)
HCT VFR BLD CALC: 30.7 % — LOW (ref 39–50)
HGB BLD-MCNC: 9.6 G/DL — LOW (ref 13–17)
MCHC RBC-ENTMCNC: 31.3 G/DL — LOW (ref 32–36)
MCHC RBC-ENTMCNC: 32.4 PG — SIGNIFICANT CHANGE UP (ref 27–34)
MCV RBC AUTO: 103.7 FL — HIGH (ref 80–100)
NRBC # BLD: 0 /100 WBCS — SIGNIFICANT CHANGE UP (ref 0–0)
NRBC BLD-RTO: 0 /100 WBCS — SIGNIFICANT CHANGE UP (ref 0–0)
PLATELET # BLD AUTO: 221 K/UL — SIGNIFICANT CHANGE UP (ref 150–400)
POTASSIUM SERPL-MCNC: 4.7 MMOL/L — SIGNIFICANT CHANGE UP (ref 3.5–5.3)
POTASSIUM SERPL-SCNC: 4.7 MMOL/L — SIGNIFICANT CHANGE UP (ref 3.5–5.3)
PROT SERPL-MCNC: 6.6 G/DL — SIGNIFICANT CHANGE UP (ref 6–8.3)
RBC # BLD: 2.96 M/UL — LOW (ref 4.2–5.8)
RBC # FLD: 13 % — SIGNIFICANT CHANGE UP (ref 10.3–14.5)
SODIUM SERPL-SCNC: 148 MMOL/L — HIGH (ref 135–145)
WBC # BLD: 7.04 K/UL — SIGNIFICANT CHANGE UP (ref 3.8–10.5)
WBC # FLD AUTO: 7.04 K/UL — SIGNIFICANT CHANGE UP (ref 3.8–10.5)

## 2025-02-03 PROCEDURE — 99232 SBSQ HOSP IP/OBS MODERATE 35: CPT

## 2025-02-03 PROCEDURE — 71045 X-RAY EXAM CHEST 1 VIEW: CPT | Mod: 26

## 2025-02-03 RX ADMIN — ATORVASTATIN CALCIUM 20 MILLIGRAM(S): 80 TABLET, FILM COATED ORAL at 21:05

## 2025-02-03 RX ADMIN — PANTOPRAZOLE 40 MILLIGRAM(S): 20 TABLET, DELAYED RELEASE ORAL at 11:17

## 2025-02-03 RX ADMIN — LIDOCAINE HYDROCHLORIDE 1 PATCH: 30 CREAM TOPICAL at 05:05

## 2025-02-03 RX ADMIN — Medication 3 MILLILITER(S): at 05:03

## 2025-02-03 RX ADMIN — LACOSAMIDE 120 MILLIGRAM(S): 200 TABLET, FILM COATED ORAL at 18:22

## 2025-02-03 RX ADMIN — ACETAMINOPHEN 400 MILLIGRAM(S): 160 SUSPENSION ORAL at 11:14

## 2025-02-03 RX ADMIN — Medication 1 APPLICATION(S): at 05:04

## 2025-02-03 RX ADMIN — LURASIDONE HYDROCHLORIDE 40 MILLIGRAM(S): 80 TABLET, FILM COATED ORAL at 11:18

## 2025-02-03 RX ADMIN — ANTISEPTIC SURGICAL SCRUB 1 APPLICATION(S): 0.04 SOLUTION TOPICAL at 05:29

## 2025-02-03 RX ADMIN — VALACYCLOVIR 1000 MILLIGRAM(S): 1000 TABLET ORAL at 17:34

## 2025-02-03 RX ADMIN — LAMOTRIGINE 100 MILLIGRAM(S): 100 TABLET ORAL at 17:35

## 2025-02-03 RX ADMIN — IPRATROPIUM BROMIDE AND ALBUTEROL SULFATE 3 MILLILITER(S): .5; 2.5 SOLUTION RESPIRATORY (INHALATION) at 11:18

## 2025-02-03 RX ADMIN — LEVETIRACETAM 400 MILLIGRAM(S): 750 TABLET, FILM COATED ORAL at 05:02

## 2025-02-03 RX ADMIN — Medication 7500 UNIT(S): at 21:05

## 2025-02-03 RX ADMIN — GABAPENTIN 150 MILLIGRAM(S): 800 TABLET ORAL at 05:03

## 2025-02-03 RX ADMIN — Medication 7500 UNIT(S): at 05:09

## 2025-02-03 RX ADMIN — IPRATROPIUM BROMIDE AND ALBUTEROL SULFATE 3 MILLILITER(S): .5; 2.5 SOLUTION RESPIRATORY (INHALATION) at 05:03

## 2025-02-03 RX ADMIN — VALACYCLOVIR 1000 MILLIGRAM(S): 1000 TABLET ORAL at 05:04

## 2025-02-03 RX ADMIN — ESCITALOPRAM 15 MILLIGRAM(S): 10 TABLET, FILM COATED ORAL at 11:17

## 2025-02-03 RX ADMIN — LACOSAMIDE 120 MILLIGRAM(S): 200 TABLET, FILM COATED ORAL at 05:02

## 2025-02-03 RX ADMIN — LIDOCAINE HYDROCHLORIDE 1 PATCH: 30 CREAM TOPICAL at 07:38

## 2025-02-03 RX ADMIN — SEVELAMER CARBONATE 800 MILLIGRAM(S): 800 TABLET, FILM COATED ORAL at 21:05

## 2025-02-03 RX ADMIN — Medication 1 APPLICATION(S): at 17:34

## 2025-02-03 RX ADMIN — IPRATROPIUM BROMIDE AND ALBUTEROL SULFATE 3 MILLILITER(S): .5; 2.5 SOLUTION RESPIRATORY (INHALATION) at 17:34

## 2025-02-03 RX ADMIN — SEVELAMER CARBONATE 800 MILLIGRAM(S): 800 TABLET, FILM COATED ORAL at 14:41

## 2025-02-03 RX ADMIN — Medication 7500 UNIT(S): at 14:41

## 2025-02-03 RX ADMIN — Medication 2 TABLET(S): at 21:05

## 2025-02-03 RX ADMIN — LAMOTRIGINE 100 MILLIGRAM(S): 100 TABLET ORAL at 05:04

## 2025-02-03 RX ADMIN — GABAPENTIN 150 MILLIGRAM(S): 800 TABLET ORAL at 17:34

## 2025-02-03 RX ADMIN — LIDOCAINE HYDROCHLORIDE 1 PATCH: 30 CREAM TOPICAL at 18:37

## 2025-02-03 RX ADMIN — POLYETHYLENE GLYCOL 3350 17 GRAM(S): 17 POWDER, FOR SOLUTION ORAL at 11:17

## 2025-02-03 RX ADMIN — ACETAMINOPHEN 1000 MILLIGRAM(S): 160 SUSPENSION ORAL at 13:00

## 2025-02-03 RX ADMIN — LEVETIRACETAM 400 MILLIGRAM(S): 750 TABLET, FILM COATED ORAL at 17:33

## 2025-02-03 RX ADMIN — SEVELAMER CARBONATE 800 MILLIGRAM(S): 800 TABLET, FILM COATED ORAL at 05:03

## 2025-02-03 NOTE — PROGRESS NOTE ADULT - ASSESSMENT
69M w/ hx of metastatic testicular cancer (s/p L orchiectomy, on etoposide/cisplatin chemo, last dose 6/2024), epilepsy (grand-mal seizures), schizophrenia, developmental delay, HTN, HLD, VAZQUEZ, stage III CKD, obesity, secondary hyperparathyroidism, anemia, thrombocytopenia, ? spinal surgery who was admitted on 1/16/25 for fall and syncope. On 1/17 patient had multiple RRTs called for grand mal seizure activity and  patient had x2 more episode and was ultimately given ativan 2mg, Vimpat load 200mg, and intubated for airway protection with MICU transfer. Extubated 1/19 - to AVAPS, post extubation with increased secretion, now better; downgraded 1/21; While on medical floors; patient developed respiratory distress; now intubated for suspected aspiration pna vs flash pulmonary edema. Extubated 1/28/25.    Problem/Plan #1: Acute Hypoxic Respiratory Failure  - Intubated for airway protection i/s/o multiple RRTs for grand mal seizure activity, Extubated 1/19 to AVAPS  - Reintubated 1/23 after hypoxic episode for suspected aspiration pna vs flash pulmonary edema.   - BNP 3949  - CXR shows mild pulm edema on 1/16; CXR 1/25  Bibasilar atelectasis. Mild interstitial edema.. Improved aeration in the right upper lobe  - TTE with preserved EF, no WMA, dilated aortic root  - Was on Bumex IV - stopped on 1/25 due to ^ creatinine 4.31 from 3.92  - CT chest 1/30 - concerning for infection, complete right middle/lower lobe consolidation/collapse; no pleural or pericardial effusion; pulm & ID following  - Check proBNP 1/31 1134 (down from 3949 on 1/23)  - bronch recommended, family does not want to pursue     Problem/Plan #2: Hypertensive Urgency  - BP now stable off anti-hypertensives. Will slowly re-implement as BP recovers.    Problem/Plan #3: HLD  - c/w atorvastatin 20mg daily    Problem/Plan #4: DVT Ppx  - c/w SQ heparin      Dr. Andrew to follow     Betsey Tran MD [facility]  Attending Interventional Cardiologist, [email or web address]-NS/[email or web address].   Avaliable on [employer] Team

## 2025-02-03 NOTE — PROGRESS NOTE ADULT - ASSESSMENT
69M w/ hx of metastatic testicular cancer (s/p L orchiectomy, on etoposide/cisplatin chemo), epilepsy (grand-mal seizures), schizophrenia, developmental delay, HTN, HLD, VAZQUEZ, stage III CKD, severe obesity, secondary hyperparathyroidism, anemia, thrombocytopenia who presented to ED for fall, possible seizure prior.    Plan:    # Hypoxia: Sepsis/sirs, AHRF possible flash pulmonary edema iso uncontrolled HTN, c/f aspiration pneumonia : Improving  - S/p RRT 1/23 am and 1/23 pm, for inc WOB on AVAPS, s/p intubation and transfer to MICU, required pressor support-> downgraded 1/29  - Sepsis protocol initiated  - CXR w/ mild pulm congestion.  - Diuresis as tolerated  - Thyroid US w/ nodule  - NGT in place, c/w TF's as tolerated-> FEEST -> rec pureed /mod thick liquids   - 1/25 CXR with R infiltrate, Bcx remain NGTD, WBC down  - 1/25 Scx with rare Pseudomonas aeruginosa -pansensitive   - MRI brain with metastatic disease  - 1/28 s/p extubation   - Discontinued zosyn --completed 7d course 1/29  - Monitor off antibiotics per ID rec's  - ID and Pulm following    # Syncopal seizure: Improved, downgraded  - Patient with hx of epilepsy, generalized grand mal seizures  - C/w Lamictal and Keppra  - CTH/C spine/MF negative for acute ICH, notable for mild R hematoma  - 1/17 sp multiple RRTs  for grand mal seizure activity and was initially recommended sepsis workup and antipyretics given T103F, but patient had x2 more episode and was ultimately given ativan 2mg, vimpat load 200mg, and intubated for airway protection with MICU transfer.   - Extubated 1/20  - Keppra 1.5g BID, Lamotrigine 100mg BID, Gabapentin 300mg BID, Vimpat 150mg BID  - Ativan for seizures PRM  - Per Neuro -> MRI brain w and wo contrast to look for potential seizure foci that could cause increased frequency especially given hx of metastatic testicular cancer; deferred MRI given copious secretions   - s/p vEEG, negative for seizures  - Care per MICU-> downgraded 1/21, back to ICU 1/23 for Hypoxia, Increased WOB, Downgraded 1/29    # Brain lesion:  - MRI brain now with 5.4 mm enhancing lesion in the LEFT middle cerebellar peduncle with associated edema suspicious for metastases  - MRI Lumbar negative for acute disease  - Neurology recs noted, new lesion not likely to cause seizure  - Seen by Heme Onc-> will need another PET-CT, can be completed outpatient once stable if concern can proceed with biopsy at that time   - Heme Onc following    # Flu:  - sp Tamiflu completed 5d on 1/22   - Monitor temps/WBC  - ID following    # CKD3:   - Monitor I/O's  - Serial Cr  - Avoid nephrotoxins  - Renally dose medications  - Renal following->  Renal fnx improving     # HTN/ HLD:  - C/w CV meds    # Pneumonia:  - Completed ceftriaxone 5d course on 1/21  - Retreated for aspiration pneumonia in ICU on 1/23  - ID following    # Schizophrenia/Depression:  - C/w current meds    # VAZQUEZ:  - CPAP at night    # Hx of metastatic testicular cancer, s/p L orchiectomy, on etoposide/cisplatin chemo, last dose on 6/21/24:  - Outpt Oncology follow-up    # DVT ppx:  - Heparin sq    Dispo: [facility]    [facility]  [phone]

## 2025-02-03 NOTE — PROGRESS NOTE ADULT - SUBJECTIVE AND OBJECTIVE BOX
Cardiology Attending Follow-up Note     Patient seen and examined at bedside.    Overnight Events:     no events     REVIEW OF SYSTEMS:    [ x] Unable to assess ROS due to mental status     Current Meds:  albuterol/ipratropium for Nebulization 3 milliLiter(s) Nebulizer every 6 hours  atorvastatin 20 milliGRAM(s) Oral at bedtime  bacitracin   Ointment 1 Application(s) Topical two times a day  chlorhexidine 2% Cloths 1 Application(s) Topical <User Schedule>  chlorhexidine 2% Cloths 1 Application(s) Topical <User Schedule>  escitalopram 15 milliGRAM(s) Oral daily  gabapentin Solution 150 milliGRAM(s) Oral two times a day  heparin   Injectable 7500 Unit(s) SubCutaneous every 8 hours  influenza  Vaccine (HIGH DOSE) 0.5 milliLiter(s) IntraMuscular once  lacosamide IVPB 100 milliGRAM(s) IV Intermittent every 12 hours  lamoTRIgine 100 milliGRAM(s) Oral two times a day  levETIRAcetam  IVPB 750 milliGRAM(s) IV Intermittent two times a day  lidocaine   4% Patch 1 Patch Transdermal daily  lurasidone 40 milliGRAM(s) Oral daily  nystatin Powder 1 Application(s) Topical three times a day PRN  pantoprazole  Injectable 40 milliGRAM(s) IV Push daily  polyethylene glycol 3350 17 Gram(s) Oral daily  senna 2 Tablet(s) Oral at bedtime  sevelamer carbonate 800 milliGRAM(s) Oral three times a day  valACYclovir 1000 milliGRAM(s) Oral every 12 hours      PAST MEDICAL & SURGICAL HISTORY:  Hypertension, unspecified type      Other hyperlipidemia      History of epilepsy      Paranoid schizophrenia      Obstructive sleep apnea      Testicular cancer      H/O Spinal surgery          Vitals:  T(F): 97.5 (02-03), Max: 98.8 (02-03)  HR: 84 (02-03) (64 - 92)  BP: 126/78 (02-03) (100/63 - 162/90)  RR: 18 (02-03)  SpO2: 95% (02-03)  I&O's Summary    02 Feb 2025 07:01  -  03 Feb 2025 07:00  --------------------------------------------------------  IN: 0 mL / OUT: 1375 mL / NET: -1375 mL    03 Feb 2025 07:01  -  03 Feb 2025 23:27  --------------------------------------------------------  IN: 0 mL / OUT: 600 mL / NET: -600 mL        Physical Exam:  Appearance: No acute distress  HENT: No JVD   Cardiovascular: RRR, S1/S2, no murmurs  Respiratory: CTABL  Gastrointestinal: soft, NT ND, +BS  Musculoskeletal: No clubbing, no edema   Neurologic: Non-focal  Skin: No rashes, ecchymoses, or cyanosis                          9.6    7.04  )-----------( 221      ( 03 Feb 2025 07:21 )             30.7     02-03    148[H]  |  114[H]  |  69[H]  ----------------------------<  93  4.7   |  20[L]  |  2.12[H]    Ca    9.8      03 Feb 2025 07:19    TPro  6.6  /  Alb  3.5  /  TBili  0.4  /  DBili  x   /  AST  52[H]  /  ALT  40  /  AlkPhos  128[H]  02-03                  Cardiovascular Testings:

## 2025-02-03 NOTE — PROGRESS NOTE ADULT - SUBJECTIVE AND OBJECTIVE BOX
Date of Service: 02-03-25 @ 16:03    Patient is a 69y old  Male who presents with a chief complaint of seizure, flu, elevated trop (03 Feb 2025 13:12)      Any change in ROS: seems sleepy: got psychotropics given in daytime:  :  called griffith sister:     MEDICATIONS  (STANDING):  albuterol/ipratropium for Nebulization 3 milliLiter(s) Nebulizer every 6 hours  atorvastatin 20 milliGRAM(s) Oral at bedtime  bacitracin   Ointment 1 Application(s) Topical two times a day  chlorhexidine 2% Cloths 1 Application(s) Topical <User Schedule>  chlorhexidine 2% Cloths 1 Application(s) Topical <User Schedule>  escitalopram 15 milliGRAM(s) Oral daily  gabapentin Solution 150 milliGRAM(s) Oral two times a day  heparin   Injectable 7500 Unit(s) SubCutaneous every 8 hours  influenza  Vaccine (HIGH DOSE) 0.5 milliLiter(s) IntraMuscular once  lacosamide IVPB 100 milliGRAM(s) IV Intermittent every 12 hours  lamoTRIgine 100 milliGRAM(s) Oral two times a day  levETIRAcetam  IVPB 750 milliGRAM(s) IV Intermittent two times a day  lidocaine   4% Patch 1 Patch Transdermal daily  lurasidone 40 milliGRAM(s) Oral daily  pantoprazole  Injectable 40 milliGRAM(s) IV Push daily  polyethylene glycol 3350 17 Gram(s) Oral daily  senna 2 Tablet(s) Oral at bedtime  sevelamer carbonate 800 milliGRAM(s) Oral three times a day  valACYclovir 1000 milliGRAM(s) Oral every 12 hours    MEDICATIONS  (PRN):  nystatin Powder 1 Application(s) Topical three times a day PRN fungal infection you may see and want to treat    Vital Signs Last 24 Hrs  T(C): 37.1 (03 Feb 2025 15:00), Max: 37.1 (03 Feb 2025 15:00)  T(F): 98.8 (03 Feb 2025 15:00), Max: 98.8 (03 Feb 2025 15:00)  HR: 88 (03 Feb 2025 15:00) (64 - 91)  BP: 134/74 (03 Feb 2025 15:00) (100/63 - 162/90)  BP(mean): --  RR: 19 (03 Feb 2025 15:00) (18 - 19)  SpO2: 99% (03 Feb 2025 15:00) (97% - 99%)    Parameters below as of 03 Feb 2025 15:00  Patient On (Oxygen Delivery Method): nasal cannula  O2 Flow (L/min): 4      I&O's Summary    02 Feb 2025 07:01  -  03 Feb 2025 07:00  --------------------------------------------------------  IN: 0 mL / OUT: 1375 mL / NET: -1375 mL          Physical Exam:   GENERAL: obese  HEENT: BREE/   Atraumatic, Normocephalic  ENMT: No tonsillar erythema, exudates, or enlargement; Moist mucous membranes, Good dentition, No lesions  NECK: Supple, No JVD, Normal thyroid  CHEST/LUNG: Clear to auscultaion:   CVS: Regular rate and rhythm; No murmurs, rubs, or gallops  GI: : Soft, Nontender, Nondistended; Bowel sounds present  NERVOUS SYSTEM:  sleepy:  but arrousable: no sob:   EXTREMITIES:- edema  LYMPH: No lymphadenopathy noted  SKIN: No rashes or lesions  ENDOCRINOLOGY: No Thyromegaly  PSYCH: Appropriate    Labs:  24, 21.0, 30.0, 30.0                            9.6    7.04  )-----------( 221      ( 03 Feb 2025 07:21 )             30.7                         10.1   7.50  )-----------( 254      ( 01 Feb 2025 06:51 )             32.7                         9.5    6.09  )-----------( 241      ( 31 Jan 2025 06:59 )             31.8     02-03    148[H]  |  114[H]  |  69[H]  ----------------------------<  93  4.7   |  20[L]  |  2.12[H]  02-02    145  |  114[H]  |  83[H]  ----------------------------<  156[H]  4.5   |  18[L]  |  2.52[H]  02-01    151[H]  |  116[H]  |  101[H]  ----------------------------<  104[H]  4.5   |  20[L]  |  2.96[H]  01-31    150[H]  |  114[H]  |  108[H]  ----------------------------<  145[H]  4.2   |  22  |  3.46[H]    Ca    9.8      03 Feb 2025 07:19  Ca    9.6      02 Feb 2025 12:37    TPro  6.6  /  Alb  3.5  /  TBili  0.4  /  DBili  x   /  AST  52[H]  /  ALT  40  /  AlkPhos  128[H]  02-03  TPro  6.7  /  Alb  3.3  /  TBili  0.3  /  DBili  x   /  AST  55[H]  /  ALT  38  /  AlkPhos  133[H]  02-02    CAPILLARY BLOOD GLUCOSE      POCT Blood Glucose.: 139 mg/dL (03 Feb 2025 15:07)      LIVER FUNCTIONS - ( 03 Feb 2025 07:19 )  Alb: 3.5 g/dL / Pro: 6.6 g/dL / ALK PHOS: 128 U/L / ALT: 40 U/L / AST: 52 U/L / GGT: x             Urinalysis Basic - ( 03 Feb 2025 07:19 )    Color: x / Appearance: x / SG: x / pH: x  Gluc: 93 mg/dL / Ketone: x  / Bili: x / Urobili: x   Blood: x / Protein: x / Nitrite: x   Leuk Esterase: x / RBC: x / WBC x   Sq Epi: x / Non Sq Epi: x / Bacteria: x    rad< from: CT Chest No Cont (01.30.25 @ 13:37) >  VISUALIZED UPPER ABDOMEN: Enteric tube terminates in the stomach on    imaging    CHEST WALL AND BONES: Thoracic spinal fusion hardware. No aggressive   osseous lesion. Old right clavicular and bilateral anterior rib fractures.    IMPRESSION:  New secretions at the level of the bronchus intermedius with complete   right middle/lower bilobar consolidation/collapse.    New scattered bilateral upper lobe groundglass opacities, concerning for   infection.    --- End of Report ---          NORMA MENDEZ MD; Resident Radiologist  This document has been electronically signed.  SALONI CUELLAR M.D., Attending Radiologist  This document has been electronically signed. Jan 30 2025  4:03PM    < end of copied text >          RECENT CULTURES:        RESPIRATORY CULTURES:          Studies  Chest X-RAY  CT SCAN Chest   Venous Dopplers: LE:   CT Abdomen  Others

## 2025-02-03 NOTE — PROGRESS NOTE ADULT - PROBLEM SELECTOR PLAN 1
-S/p intubation in MICU in the setting of grand mal seizures, pneumonia, Flu  -Completed course of ABX, tamiflu for Flu. Extubated to AVAPS 1/19  -S/p RRT 1/23 AM for fevers, hypoxia requiring AVAPS. Likely aspiration event +/- flash pulmonary edema  -S/p 2nd RRT 1/23 PM for increased WOB on AVAPS, intubated and tx to MICU   -Extubated to AVAPS 1/28, now using qHS   -Continue AVAPS  ePAP 5 Rate 14 Max pressure 35 Min Pressure 15 fio2 40% qHS and PRN for increased WOB for now. Pt has CPAP at home for VAZQUEZ, will need to monitor off AVAPs prior to discharge.  -Keep sats >90% with O2 PRN. Wean o2 as tolerated.   -CXR 1/29 with low lung volumes  -Repeat CT chest with bronchial secretions, RML/RLL atelectasis +/- underlying infiltrate, scattered b/l upper lobe GGO. ABX completed, ID f/u.   -Continue bronchodilators. Start Mucomyst 20% 3ml q6h x3 days (give with Duoneb)  -Chest PT   -ABG with no Co2 retention.    2/2: seems OK:  has rll atelectasis:  hopefully will improve on its own so that bronc does not have to be dne:  cont conservative rx for now:  2/3: repeat cxr:  dw sister about possibility of bronch : she is  not interested in it at this time:

## 2025-02-03 NOTE — PROGRESS NOTE ADULT - SUBJECTIVE AND OBJECTIVE BOX
[facility]  JACINTO Horton Y. Patel, S. Shah, G. Casimir  [phone]  ([phone] - weekdays after 5pm and weekends)    Name: OSCAR MILAN  Age/Gender: 69y Male  MRN: [id number]    Interval History:  Patient seen and examined this morning.   No new complaints noted.  Notes reviewed  No concerning overnight events  Afebrile   Allergies: penicillin (Hives)  seasonal allergies (Unknown)      Objective:  Vitals:   T(F): 98 (02-03-25 @ 07:32), Max: 98.4 (02-02-25 @ 10:46)  HR: 64 (02-03-25 @ 07:32) (64 - 100)  BP: 100/63 (02-03-25 @ 07:32) (100/63 - 162/90)  RR: 18 (02-03-25 @ 07:32) (18 - 18)  SpO2: 99% (02-03-25 @ 07:32) (96% - 99%)  Physical Examination:  General: no acute distress, NC  HEENT: normocephalic, atraumatic, lip lesions  Respiratory: no acc muscle use, breathing comfortably  Cardiovascular: S1 and S2 present  Gastrointestinal: obese, nondistended  Extremities: no edema, no cyanosis  Skin: no visible rash    Laboratory Studies:  CBC:                       9.6    7.04  )-----------( 221      ( 03 Feb 2025 07:21 )             30.7     WBC Trend:  7.04 02-03-25 @ 07:21  7.50 02-01-25 @ 06:51  6.09 01-31-25 @ 06:59  7.16 01-30-25 @ 07:15  7.01 01-29-25 @ 00:40  6.50 01-28-25 @ 00:17    CMP: 02-03    148[H]  |  114[H]  |  69[H]  ----------------------------<  93  4.7   |  20[L]  |  2.12[H]    Ca    9.8      03 Feb 2025 07:19    TPro  6.6  /  Alb  3.5  /  TBili  0.4  /  DBili  x   /  AST  52[H]  /  ALT  40  /  AlkPhos  128[H]  02-03    Creatinine: 2.12 mg/dL (02-03-25 @ 07:19)  Creatinine: 2.52 mg/dL (02-02-25 @ 12:37)  Creatinine: 2.96 mg/dL (02-01-25 @ 06:51)  Creatinine: 3.46 mg/dL (01-31-25 @ 06:59)  Creatinine: 4.25 mg/dL (01-30-25 @ 07:12)  Creatinine: 4.71 mg/dL (01-29-25 @ 11:56)  Creatinine: 5.08 mg/dL (01-29-25 @ 00:39)  Creatinine: 4.86 mg/dL (01-28-25 @ 15:25)  Creatinine: 4.80 mg/dL (01-28-25 @ 00:17)    LIVER FUNCTIONS - ( 03 Feb 2025 07:19 )  Alb: 3.5 g/dL / Pro: 6.6 g/dL / ALK PHOS: 128 U/L / ALT: 40 U/L / AST: 52 U/L / GGT: x           Microbiology: reviewed   Culture - Sputum (collected 01-25-25 @ 07:21)  Source: .Sputum Sputum  Gram Stain (01-26-25 @ 23:12):    Numerous polymorphonuclear leukocytes per low power field    Rare Squamous epithelial cells per low power field    No organisms seen  Final Report (01-27-25 @ 17:11):    Rare Pseudomonas aeruginosa    Commensal lucia consistent with body site  Organism: Pseudomonas aeruginosa (01-27-25 @ 17:11)  Organism: Pseudomonas aeruginosa (01-27-25 @ 17:11)      Method Type: MARIELENA      -  Aztreonam: S <=4      -  Cefepime: S <=2      -  Ceftazidime: S 4      -  Ciprofloxacin: S <=0.25      -  Imipenem: S 2      -  Levofloxacin: S <=0.5      -  Meropenem: S <=1      -  Piperacillin/Tazobactam: S <=8    Culture - Urine (collected 01-24-25 @ 17:16)  Source: Catheterized Catheterized  Final Report (01-26-25 @ 02:35):    No growth    Culture - Blood (collected 01-23-25 @ 15:14)  Source: .Blood BLOOD  Final Report (01-28-25 @ 20:00):    No growth at 5 days    Culture - Blood (collected 01-23-25 @ 15:14)  Source: .Blood BLOOD  Final Report (01-28-25 @ 20:00):    No growth at 5 days    Culture - Blood (collected 01-23-25 @ 12:21)  Source: .Blood BLOOD  Final Report (01-28-25 @ 17:00):    No growth at 5 days    Culture - Blood (collected 01-23-25 @ 12:21)  Source: .Blood BLOOD  Final Report (01-28-25 @ 17:00):    No growth at 5 days    Culture - Blood (collected 01-23-25 @ 00:15)  Source: .Blood BLOOD  Final Report (01-28-25 @ 04:01):    No growth at 5 days    Culture - Blood (collected 01-23-25 @ 00:10)  Source: .Blood BLOOD  Final Report (01-28-25 @ 04:01):    No growth at 5 days    Radiology: reviewed     Medications:  acetaminophen   IVPB .. 1000 milliGRAM(s) IV Intermittent once  albuterol/ipratropium for Nebulization 3 milliLiter(s) Nebulizer every 6 hours  atorvastatin 20 milliGRAM(s) Oral at bedtime  bacitracin   Ointment 1 Application(s) Topical two times a day  chlorhexidine 2% Cloths 1 Application(s) Topical <User Schedule>  chlorhexidine 2% Cloths 1 Application(s) Topical <User Schedule>  escitalopram 15 milliGRAM(s) Oral daily  gabapentin Solution 150 milliGRAM(s) Oral two times a day  heparin   Injectable 7500 Unit(s) SubCutaneous every 8 hours  influenza  Vaccine (HIGH DOSE) 0.5 milliLiter(s) IntraMuscular once  lacosamide IVPB 100 milliGRAM(s) IV Intermittent every 12 hours  lamoTRIgine 100 milliGRAM(s) Oral two times a day  levETIRAcetam  IVPB 750 milliGRAM(s) IV Intermittent two times a day  lidocaine   4% Patch 1 Patch Transdermal daily  lurasidone 40 milliGRAM(s) Oral daily  nystatin Powder 1 Application(s) Topical three times a day PRN  pantoprazole  Injectable 40 milliGRAM(s) IV Push daily  polyethylene glycol 3350 17 Gram(s) Oral daily  senna 2 Tablet(s) Oral at bedtime  sevelamer carbonate 800 milliGRAM(s) Oral three times a day  valACYclovir 1000 milliGRAM(s) Oral every 12 hours    Current Antimicrobials:  valACYclovir 1000 milliGRAM(s) Oral every 12 hours    Prior/Completed Antimicrobials:  cefTRIAXone   IVPB  cefTRIAXone   IVPB  oseltamivir  piperacillin/tazobactam IVPB..  vancomycin  IVPB

## 2025-02-03 NOTE — PROGRESS NOTE ADULT - SUBJECTIVE AND OBJECTIVE BOX
[facility] HEMATOLOGY/ONCOLOGY INPATIENT PROGRESS NOTE     Interval Hx:   02-03-25: Mr. Gr was seen at bedside today.    Meds:   MEDICATIONS  (STANDING):  acetaminophen   IVPB .. 1000 milliGRAM(s) IV Intermittent once  acetylcysteine 20%  Inhalation 3 milliLiter(s) Inhalation every 6 hours  albuterol/ipratropium for Nebulization 3 milliLiter(s) Nebulizer every 6 hours  atorvastatin 20 milliGRAM(s) Oral at bedtime  bacitracin   Ointment 1 Application(s) Topical two times a day  chlorhexidine 2% Cloths 1 Application(s) Topical <User Schedule>  chlorhexidine 2% Cloths 1 Application(s) Topical <User Schedule>  escitalopram 15 milliGRAM(s) Oral daily  gabapentin Solution 150 milliGRAM(s) Oral two times a day  heparin   Injectable 7500 Unit(s) SubCutaneous every 8 hours  influenza  Vaccine (HIGH DOSE) 0.5 milliLiter(s) IntraMuscular once  lacosamide IVPB 100 milliGRAM(s) IV Intermittent every 12 hours  lamoTRIgine 100 milliGRAM(s) Oral two times a day  levETIRAcetam  IVPB 750 milliGRAM(s) IV Intermittent two times a day  lidocaine   4% Patch 1 Patch Transdermal daily  lurasidone 40 milliGRAM(s) Oral daily  pantoprazole  Injectable 40 milliGRAM(s) IV Push daily  polyethylene glycol 3350 17 Gram(s) Oral daily  senna 2 Tablet(s) Oral at bedtime  sevelamer carbonate 800 milliGRAM(s) Oral three times a day  valACYclovir 1000 milliGRAM(s) Oral every 12 hours    MEDICATIONS  (PRN):  nystatin Powder 1 Application(s) Topical three times a day PRN fungal infection you may see and want to treat    Vital Signs Last 24 Hrs  T(C): 36.8 (03 Feb 2025 00:00), Max: 36.9 (02 Feb 2025 10:46)  T(F): 98.3 (03 Feb 2025 00:00), Max: 98.4 (02 Feb 2025 10:46)  HR: 90 (03 Feb 2025 00:00) (77 - 100)  BP: 162/90 (03 Feb 2025 00:00) (130/85 - 162/90)  BP(mean): --  RR: 18 (03 Feb 2025 00:00) (18 - 18)  SpO2: 99% (03 Feb 2025 00:00) (95% - 99%)    Parameters below as of 03 Feb 2025 00:00  Patient On (Oxygen Delivery Method): BiPAP/CPAP    Physical Exam:  Gen: NAD  HEENT: EOMI, MMM  Chest: equal chest rise  Cardiac: regular   Abd: non distended   Neuro: AAOx2    Labs:                        10.1   7.50  )-----------( 254      ( 01 Feb 2025 06:51 )             32.7     CBC Full  -  ( 01 Feb 2025 06:51 )  WBC Count : 7.50 K/uL  RBC Count : 3.15 M/uL  Hemoglobin : 10.1 g/dL  Hematocrit : 32.7 %  Platelet Count - Automated : 254 K/uL  Mean Cell Volume : 103.8 fl  Mean Cell Hemoglobin : 32.1 pg  Mean Cell Hemoglobin Concentration : 30.9 g/dL    02-02    145  |  114[H]  |  83[H]  ----------------------------<  156[H]  4.5   |  18[L]  |  2.52[H]    Ca    9.6      02 Feb 2025 12:37    TPro  6.7  /  Alb  3.3  /  TBili  0.3  /  DBili  x   /  AST  55[H]  /  ALT  38  /  AlkPhos  133[H]  02-02      Bilirubin Total: 0.3 mg/dL (02-02-25 @ 12:37)   [facility] HEMATOLOGY/ONCOLOGY INPATIENT PROGRESS NOTE     Interval Hx:   02-03-25: Mr. Gr was seen at bedside today, awake and alert, no overnight events noted     Meds:   MEDICATIONS  (STANDING):  acetaminophen   IVPB .. 1000 milliGRAM(s) IV Intermittent once  acetylcysteine 20%  Inhalation 3 milliLiter(s) Inhalation every 6 hours  albuterol/ipratropium for Nebulization 3 milliLiter(s) Nebulizer every 6 hours  atorvastatin 20 milliGRAM(s) Oral at bedtime  bacitracin   Ointment 1 Application(s) Topical two times a day  chlorhexidine 2% Cloths 1 Application(s) Topical <User Schedule>  chlorhexidine 2% Cloths 1 Application(s) Topical <User Schedule>  escitalopram 15 milliGRAM(s) Oral daily  gabapentin Solution 150 milliGRAM(s) Oral two times a day  heparin   Injectable 7500 Unit(s) SubCutaneous every 8 hours  influenza  Vaccine (HIGH DOSE) 0.5 milliLiter(s) IntraMuscular once  lacosamide IVPB 100 milliGRAM(s) IV Intermittent every 12 hours  lamoTRIgine 100 milliGRAM(s) Oral two times a day  levETIRAcetam  IVPB 750 milliGRAM(s) IV Intermittent two times a day  lidocaine   4% Patch 1 Patch Transdermal daily  lurasidone 40 milliGRAM(s) Oral daily  pantoprazole  Injectable 40 milliGRAM(s) IV Push daily  polyethylene glycol 3350 17 Gram(s) Oral daily  senna 2 Tablet(s) Oral at bedtime  sevelamer carbonate 800 milliGRAM(s) Oral three times a day  valACYclovir 1000 milliGRAM(s) Oral every 12 hours    MEDICATIONS  (PRN):  nystatin Powder 1 Application(s) Topical three times a day PRN fungal infection you may see and want to treat    Vital Signs Last 24 Hrs  T(C): 36.8 (03 Feb 2025 00:00), Max: 36.9 (02 Feb 2025 10:46)  T(F): 98.3 (03 Feb 2025 00:00), Max: 98.4 (02 Feb 2025 10:46)  HR: 90 (03 Feb 2025 00:00) (77 - 100)  BP: 162/90 (03 Feb 2025 00:00) (130/85 - 162/90)  BP(mean): --  RR: 18 (03 Feb 2025 00:00) (18 - 18)  SpO2: 99% (03 Feb 2025 00:00) (95% - 99%)    Parameters below as of 03 Feb 2025 00:00  Patient On (Oxygen Delivery Method): BiPAP/CPAP    Physical Exam:  Gen: NAD  HEENT: EOMI, MMM  Chest: equal chest rise  Cardiac: regular   Abd: non distended   Neuro: AAOx2    Labs:                        10.1   7.50  )-----------( 254      ( 01 Feb 2025 06:51 )             32.7     CBC Full  -  ( 01 Feb 2025 06:51 )  WBC Count : 7.50 K/uL  RBC Count : 3.15 M/uL  Hemoglobin : 10.1 g/dL  Hematocrit : 32.7 %  Platelet Count - Automated : 254 K/uL  Mean Cell Volume : 103.8 fl  Mean Cell Hemoglobin : 32.1 pg  Mean Cell Hemoglobin Concentration : 30.9 g/dL    02-02    145  |  114[H]  |  83[H]  ----------------------------<  156[H]  4.5   |  18[L]  |  2.52[H]    Ca    9.6      02 Feb 2025 12:37    TPro  6.7  /  Alb  3.3  /  TBili  0.3  /  DBili  x   /  AST  55[H]  /  ALT  38  /  AlkPhos  133[H]  02-02      Bilirubin Total: 0.3 mg/dL (02-02-25 @ 12:37)

## 2025-02-03 NOTE — PROGRESS NOTE ADULT - SUBJECTIVE AND OBJECTIVE BOX
[facility]   [phone]  RENAL FOLLOW UP NOTE  --------------------------------------------------------------------------------  Chief Complaint:    24 hour events/subjective:    patient seen and examined.  appears comfortable    PAST HISTORY  --------------------------------------------------------------------------------  No significant changes to PMH, PSH, FHx, SHx, unless otherwise noted    ALLERGIES & MEDICATIONS  --------------------------------------------------------------------------------  Allergies    penicillin (Hives)  seasonal allergies (Unknown)    Intolerances    latex (Rash)    Standing Inpatient Medications  albuterol/ipratropium for Nebulization 3 milliLiter(s) Nebulizer every 6 hours  atorvastatin 20 milliGRAM(s) Oral at bedtime  bacitracin   Ointment 1 Application(s) Topical two times a day  chlorhexidine 2% Cloths 1 Application(s) Topical <User Schedule>  chlorhexidine 2% Cloths 1 Application(s) Topical <User Schedule>  escitalopram 15 milliGRAM(s) Oral daily  gabapentin Solution 150 milliGRAM(s) Oral two times a day  heparin   Injectable 7500 Unit(s) SubCutaneous every 8 hours  influenza  Vaccine (HIGH DOSE) 0.5 milliLiter(s) IntraMuscular once  lacosamide IVPB 100 milliGRAM(s) IV Intermittent every 12 hours  lamoTRIgine 100 milliGRAM(s) Oral two times a day  levETIRAcetam  IVPB 750 milliGRAM(s) IV Intermittent two times a day  lidocaine   4% Patch 1 Patch Transdermal daily  lurasidone 40 milliGRAM(s) Oral daily  pantoprazole  Injectable 40 milliGRAM(s) IV Push daily  polyethylene glycol 3350 17 Gram(s) Oral daily  senna 2 Tablet(s) Oral at bedtime  sevelamer carbonate 800 milliGRAM(s) Oral three times a day  valACYclovir 1000 milliGRAM(s) Oral every 12 hours    PRN Inpatient Medications  nystatin Powder 1 Application(s) Topical three times a day PRN      REVIEW OF SYSTEMS  --------------------------------------------------------------------------------    Gen: denies fevers/chills,  CVS: denies chest pain/palpitations  Resp: denies SOB/Cough  GI: Denies N/V/Abd pain  : Denies dysuria    VITALS/PHYSICAL EXAM  --------------------------------------------------------------------------------  T(C): 36.7 (02-03-25 @ 07:32), Max: 36.8 (02-02-25 @ 15:59)  HR: 91 (02-03-25 @ 11:33) (64 - 100)  BP: 100/63 (02-03-25 @ 07:32) (100/63 - 162/90)  RR: 18 (02-03-25 @ 07:32) (18 - 18)  SpO2: 97% (02-03-25 @ 11:33) (96% - 99%)  Wt(kg): --        02-02-25 @ 07:01  -  02-03-25 @ 07:00  --------------------------------------------------------  IN: 0 mL / OUT: 1375 mL / NET: -1375 mL      Physical Exam:  	              Gen: comfortable appearing  on O2   	Pulm: decrease bs  no rales  +  ronchi  no wheezing  	CV: tachy  S1S2; no rub  	Abd: hypoactive bs soft distended  	UE: Warm, no cyanosis  no clubbing,  no edema;   	LE: Warm, no cyanosis  no clubbing, no edema  	Neuro:  alert and oriented    LABS/STUDIES  --------------------------------------------------------------------------------              9.6    7.04  >-----------<  221      [02-03-25 @ 07:21]              30.7     148  |  114  |  69  ----------------------------<  93      [02-03-25 @ 07:19]  4.7   |  20  |  2.12        Ca     9.8     [02-03-25 @ 07:19]    TPro  6.6  /  Alb  3.5  /  TBili  0.4  /  DBili  x   /  AST  52  /  ALT  40  /  AlkPhos  128  [02-03-25 @ 07:19]          Creatinine Trend:  SCr 2.12 [02-03 @ 07:19]  SCr 2.52 [02-02 @ 12:37]  SCr 2.96 [02-01 @ 06:51]  SCr 3.46 [01-31 @ 06:59]  SCr 4.25 [01-30 @ 07:12]            TSH 0.87      [01-25-25 @ 11:31]

## 2025-02-03 NOTE — PROGRESS NOTE ADULT - ASSESSMENT
Patient is a 69 year old male with PMH of metastatic testicular cancer (s/p L orchiectomy, on etoposide/cisplatin chemo, last dose 6/2024), epilepsy (grand-mal seizures), schizophrenia, developmental delay, HTN, HLD, VAZQUEZ, stage III CKD, severe obesity, secondary hyperparathyroidism, anemia, thrombocytopenia who presented to ED for fall, possible seizure prior. Patient reported cough for few weeks with no other respiratory symptoms.   Admitted for further work up for syncope, c/f seizure   Influenza A  1/17 s/p RRTs for grand mal seizure activity, s/p intubation and transfer to MICU  - 1/17 CT chest with anterior bowing of posterior tracheal wall suggestive of tracheomalacia, trace R pleural effusion   - MRSA PCR screen negative    - 1/18 sputum culture negative    - s/p extubation 1/19   - 1/20 Bcx negative    - s/p ceftriaxone 1/17-1/21   - s/p tamiflu 1/17-1/21    Sepsis/sirs, AHRF possible flash pulmonary edema iso uncontrolled HTN, c/f aspiration pneumonia   - 1/23 s/p RRT am for hypoxia/inc WOB, febrile last night 100.6F and now 101F this am, tachycardia, leukocytosis 11k   - repeat COVID/Flu/RSV with known influenza A-likely shedding   - 1/23 afternoon s/p RRT for inc WOB on AVAPS, s/p intubation and transfer to MICU, required pressor support  - MRSA PCR screen negative   - 1/24 afebrile overnight, WBC uptrending, weaned off pressor earlier in am, on zosyn   - 1/25 CXR with R infiltrate, Bcx remain NGTD, WBC down  - 1/25 Scx with rare Pseudomonas aeruginosa -pansensitive   - MRI brain with metastatic disease, thyroid us with nodule  - 1/26 CT spine with no acute abn of spine, stable postop changes and hardware; noted with multifocal lung abn with bibasilar atelectasis or consolidation and patchy airspace opacities in the RUL  - 1/28 s/p extubation   - 1/30 CT chest with bronchial secretions with complete RML/RLL consolidation/collapse, scattered b/l upper lobe ggo, no RUL mass noted, possible resolving pneumonia   - reportedly h/o penicillin allergy--tolerating zosyn, removed from allergy list   - remains afebrile, WBC wnl, on NC, nontoxic appearing with no new complaints, completed antibiotics 1/30 am     s/p cefepime 1/23  s/p zosyn 1/23-1/30    Recommendations:   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  [facility]  Available on [employer] TEAMS - *PREFERRED*  [phone]  After 5pm on weekdays and all day on weekends - please call [phone]     Thank you for consulting us and involving us in the management of this patients case. In addition to reviewing history, imaging, documents, labs, microbiology, took into account antibiotic stewardship, local antibiogram and infection control strategies and potential transmission issues.

## 2025-02-03 NOTE — PROGRESS NOTE ADULT - ASSESSMENT
Last office visit with : 04/25/2019      No pending appointment     Last refill: 3/12/2020   Disp Refills Start End    HYDROcodone-acetaminophen (NORCO) 7.5-325 MG per tablet 60 tablet 0 3/12/2020     Sig - Route: Take 1 tablet by mouth every 12 hours as needed for Pain. - Oral      Forwarding to  for refill consideration, no refill protocol    Mr. Gr is a 69 year old male with PMHx of seizure disorder, sleep apnea, HTN, chronic idiopathic constipation, stage III CKD, severe obesity, secondary hyperparathyroidism, anemia, thrombocytopenia, hyperlipidemia, testicular cancer s/p EP x 4 under the care of Dr. Ovalles who is admitted s/p fall in setting of possible seizure since 01/16/2025. He was intubated early in his admission due to seziures and extubated 1/21/2025 and then again intubated in setting of respiratory distress, requiring diuresis, with SHAGUFTA on CKD, in the MICU. Imaging shows possible brain metastatic disease and lung mass as well as abnormal thyroid nodule.     Former smoker, quit 14y prior, denied ETOH, drug use. Lives at home. Does not have children. Denied family history of blood disorders or malignancy.  Denied previous workplace related exposures.  Denied previous history of blood transfusions.  Denied history of thromboses.  Denied history of  requiring anticoagulation.        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  Margins were negative.  pT3Nx. CT C/A/P with few left para-aortic and left iliac chain lymph nodes largest measuring 8.1 cm left para-aortic node, bilateral subcentimeter noncalcified pulmonary nodules measuring up to 7 mm. AFP, LDH, hCG were all elevated. 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 Completed four cycles of adjuvant therapy with Cisplatin+Etoposide, with cisplatin dose reduced 50% and Etoposide 50% due to thrombocytopenia at that time. Subsequent scans 08/2024 showed resolution of disease and negative markers,         01/26/2025: continues intubated and sedated in the ICU, discussed with ICU team yesterday, pending markers, endocrinology eval noted     01/27/2025:  AFP/BHCG normal,     01/28/2025: continues in MICU intubated, off sedation, Neurology recs noted, L cerebellar lesion not likely be cause of seizure, GOC noted per palliative discussion, pts primary Oncologist aware of current events as well     01/29/2025: , awake and alert, extubated 01/28/2025, continues in MICU     01/30/2025: now on floor, bipap, no overnight events noted    01/31/2025:  repeat CT chest w/o contrast noted with new secretions at the level of the bronchus intermedius with complete right middle/lower bilobar consolidation/collapse and new scattered bilateral upper lobe groundglass opacities, concerning for infection. Discussed with pts wife and discussed with primary Oncologist Dr. Ovlales, agree with current plan    02/01/2025:  no overnight events noted, recapped hospitalization, aware of outpatient plan once he is stable, no signs of bleeding     02/02/2025:  renal function improving, HSV1 of lip positive         # Brain lesion  # Seizures  - Seizures noted, pt with hx of seizures  - MRI brain now with 5.4 mm enhancing lesion in the LEFT middle cerebellar peduncle with associated edema suspicious for metastases  - MRI Lumbar negative for acute disease  - Small lesion without mass effect or edema, recommended to correlate per neurology if foci and locus are concordant with seizure activity  - Neurology recs noted, new lesion not likely to cause seizure  - It is rare, but nonetheless not impossible, for testicular cancer to be metastatic to the brain  - He will need another PET-CT, can be completed outpatient once stable if concern can proceed with biopsy at that time   - Endocrinology eval for thyroid nodule  - AFP/BHCG normal,      # Thyroid nodule  - US Thyroid 01/24/2025 with 1.6cm complex mixed solid cystic hypoechoic nodule in the lower pole of the right thyroid lobe, TIRADS 5.  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 Ovalles 12/03/2024, markers continued to be negative  - See above in regards to brain lesion  - MRI Neck shows 4.2 cm RIGHT upper lobe mass/infiltrate  - Recommend IR guided biopsy of RUL mass if PET-CT concordant/suggestive of malignancy, PET-CT as outpt and then consideration after better characterization   - Repeat CT chest w/o contrast noted with new secretions at the level of the bronchus intermedius with complete right middle/lower bilobar consolidation/collapse and new scattered bilateral upper lobe groundglass opacities, concerning for infection  - Discussed with pts wife and discussed with primary Oncologist Dr. 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 Gr, please do not hesitate to call or text me if you have further questions or concerns.        Brayan Mart MD  [facility]   Division of Hematology/Oncology  [facility], [address]  [place] [address]  P: [phone]  F: [phone]    Attestation:    ----Pt evalulated including face-to-face interaction in addition to chart review, reviewing treatment plan, and managing the patient’s chronic diagnoses as listed in the assessment----

## 2025-02-03 NOTE — PROGRESS NOTE ADULT - ASSESSMENT
70 y/o M with PMH of metastatic testicular cancer (s/p L orchiectomy, on etoposide/cisplatin chemo, last dose 6/2024), epilepsy (grand-mal seizures), schizophrenia, developmental delay, HTN, HLD, VAZQUEZ, stage III CKD, severe obesity, secondary hyperparathyroidism, anemia, thrombocytopenia who presented to ED for fall, possible seizure prior. On 1/17 patient had multiple RRTs called for grand mal seizure activity and  patient had x2 more episode and was ultimately given ativan 2mg, Vimpat load 200mg, and intubated for airway protection with MICU transfer. Extubated 1/19 - to AVAPS, post extubation with increased secretion. Downgraded to medical floor 1/21. Pt still with fevers, s/p RRT 1/23 AM for fever, hypoxia.  AVAPS - O2 sats 98% but tachypneic to 40, tachycardic, pt endorsing SOB. RRT ---> intubated for respiratory failure. also febrile and Hypertensive. CKD      1- CKD III  with SHAGUFTA   2- CHF   3- fevers  4- tachycardia  5- respiratory failure s/p extubation   6- HTN   7- hypernatremia     SHAGUFTA suspect ischemic ATN in setting of infection and hypotension   creatinine is improving  off diuretics now   non oliguric   hypernatremia, on dysphagia diet, encourage increased thickened juice/water  bronchodilators   still hypoxemic and cont O2 pulm following   hyperphosphatemia renvela 1 tab tid, trend phos in am  strict I/O  PT  trend bmp daily  d.w pt sister today

## 2025-02-03 NOTE — PROGRESS NOTE ADULT - SUBJECTIVE AND OBJECTIVE BOX
SUBJECTIVE / OVERNIGHT EVENTS:          --------------------------------------------------------------------------------------------  LABS:                        10.1   7.50  )-----------( 254      ( 01 Feb 2025 06:51 )             32.7     02-02    145  |  114[H]  |  83[H]  ----------------------------<  156[H]  4.5   |  18[L]  |  2.52[H]    Ca    9.6      02 Feb 2025 12:37    TPro  6.7  /  Alb  3.3  /  TBili  0.3  /  DBili  x   /  AST  55[H]  /  ALT  38  /  AlkPhos  133[H]  02-02      CAPILLARY BLOOD GLUCOSE            Urinalysis Basic - ( 02 Feb 2025 12:37 )    Color: x / Appearance: x / SG: x / pH: x  Gluc: 156 mg/dL / Ketone: x  / Bili: x / Urobili: x   Blood: x / Protein: x / Nitrite: x   Leuk Esterase: x / RBC: x / WBC x   Sq Epi: x / Non Sq Epi: x / Bacteria: x        RADIOLOGY & ADDITIONAL TESTS:    Imaging Personally Reviewed:  [x] YES  [ ] NO    Consultant(s) Notes Reviewed:  [x] YES  [ ] NO    MEDICATIONS  (STANDING):  acetaminophen   IVPB .. 1000 milliGRAM(s) IV Intermittent once  albuterol/ipratropium for Nebulization 3 milliLiter(s) Nebulizer every 6 hours  atorvastatin 20 milliGRAM(s) Oral at bedtime  bacitracin   Ointment 1 Application(s) Topical two times a day  chlorhexidine 2% Cloths 1 Application(s) Topical <User Schedule>  chlorhexidine 2% Cloths 1 Application(s) Topical <User Schedule>  escitalopram 15 milliGRAM(s) Oral daily  gabapentin Solution 150 milliGRAM(s) Oral two times a day  heparin   Injectable 7500 Unit(s) SubCutaneous every 8 hours  influenza  Vaccine (HIGH DOSE) 0.5 milliLiter(s) IntraMuscular once  lacosamide IVPB 100 milliGRAM(s) IV Intermittent every 12 hours  lamoTRIgine 100 milliGRAM(s) Oral two times a day  levETIRAcetam  IVPB 750 milliGRAM(s) IV Intermittent two times a day  lidocaine   4% Patch 1 Patch Transdermal daily  lurasidone 40 milliGRAM(s) Oral daily  pantoprazole  Injectable 40 milliGRAM(s) IV Push daily  polyethylene glycol 3350 17 Gram(s) Oral daily  senna 2 Tablet(s) Oral at bedtime  sevelamer carbonate 800 milliGRAM(s) Oral three times a day  valACYclovir 1000 milliGRAM(s) Oral every 12 hours    MEDICATIONS  (PRN):  nystatin Powder 1 Application(s) Topical three times a day PRN fungal infection you may see and want to treat      Care Discussed with Consultants/Other Providers [x] YES  [ ] NO    Vital Signs Last 24 Hrs  T(C): 36.8 (03 Feb 2025 00:00), Max: 36.9 (02 Feb 2025 10:46)  T(F): 98.3 (03 Feb 2025 00:00), Max: 98.4 (02 Feb 2025 10:46)  HR: 75 (03 Feb 2025 05:32) (75 - 100)  BP: 162/90 (03 Feb 2025 00:00) (130/85 - 162/90)  BP(mean): --  RR: 18 (03 Feb 2025 00:00) (18 - 18)  SpO2: 98% (03 Feb 2025 05:32) (95% - 99%)    Parameters below as of 03 Feb 2025 00:00  Patient On (Oxygen Delivery Method): BiPAP/CPAP      I&O's Summary    01 Feb 2025 07:01  -  02 Feb 2025 07:00  --------------------------------------------------------  IN: 0 mL / OUT: 1200 mL / NET: -1200 mL    02 Feb 2025 07:01  -  03 Feb 2025 06:50  --------------------------------------------------------  IN: 0 mL / OUT: 1375 mL / NET: -1375 mL    PHYSICAL EXAM:  GENERAL: NAD,   HEAD:  Atraumatic, Normocephalic,  EYES: EOMI, PERRLA, conjunctiva and sclera clear  NECK: Supple, No JVD  CHEST/LUNG: Diminished bilaterally; No wheeze  HEART: Regular rate and rhythm; No murmurs, rubs, or gallops  ABDOMEN: Soft, Nontender, Nondistended; Bowel sounds present  NEURO: AAOx3, no focal weakness, 5/5 b/l extremity strength, b/l knee no arthritis, no effusion   EXTREMITIES:  2+ Peripheral Pulses, No clubbing, cyanosis, or edema  SKIN: No rashes or lesions

## 2025-02-04 ENCOUNTER — TRANSCRIPTION ENCOUNTER (OUTPATIENT)
Age: 70
End: 2025-02-04

## 2025-02-04 LAB
ANION GAP SERPL CALC-SCNC: 13 MMOL/L — SIGNIFICANT CHANGE UP (ref 5–17)
BUN SERPL-MCNC: 61 MG/DL — HIGH (ref 7–23)
CALCIUM SERPL-MCNC: 9.8 MG/DL — SIGNIFICANT CHANGE UP (ref 8.4–10.5)
CHLORIDE SERPL-SCNC: 110 MMOL/L — HIGH (ref 96–108)
CO2 SERPL-SCNC: 19 MMOL/L — LOW (ref 22–31)
CREAT SERPL-MCNC: 2.3 MG/DL — HIGH (ref 0.5–1.3)
EGFR: 30 ML/MIN/1.73M2 — LOW
GAS PNL BLDV: SIGNIFICANT CHANGE UP
GLUCOSE SERPL-MCNC: 91 MG/DL — SIGNIFICANT CHANGE UP (ref 70–99)
HCT VFR BLD CALC: 27.4 % — LOW (ref 39–50)
HGB BLD-MCNC: 8.5 G/DL — LOW (ref 13–17)
MCHC RBC-ENTMCNC: 31 G/DL — LOW (ref 32–36)
MCHC RBC-ENTMCNC: 31.6 PG — SIGNIFICANT CHANGE UP (ref 27–34)
MCV RBC AUTO: 101.9 FL — HIGH (ref 80–100)
NRBC # BLD: 0 /100 WBCS — SIGNIFICANT CHANGE UP (ref 0–0)
NRBC BLD-RTO: 0 /100 WBCS — SIGNIFICANT CHANGE UP (ref 0–0)
PLATELET # BLD AUTO: 192 K/UL — SIGNIFICANT CHANGE UP (ref 150–400)
POTASSIUM SERPL-MCNC: 5.1 MMOL/L — SIGNIFICANT CHANGE UP (ref 3.5–5.3)
POTASSIUM SERPL-SCNC: 5.1 MMOL/L — SIGNIFICANT CHANGE UP (ref 3.5–5.3)
RBC # BLD: 2.69 M/UL — LOW (ref 4.2–5.8)
RBC # FLD: 12.9 % — SIGNIFICANT CHANGE UP (ref 10.3–14.5)
SODIUM SERPL-SCNC: 142 MMOL/L — SIGNIFICANT CHANGE UP (ref 135–145)
WBC # BLD: 7.09 K/UL — SIGNIFICANT CHANGE UP (ref 3.8–10.5)
WBC # FLD AUTO: 7.09 K/UL — SIGNIFICANT CHANGE UP (ref 3.8–10.5)

## 2025-02-04 RX ORDER — PHENOL 1.4 %
1 AEROSOL, SPRAY (ML) MUCOUS MEMBRANE ONCE
Refills: 0 | Status: COMPLETED | OUTPATIENT
Start: 2025-02-04 | End: 2025-02-04

## 2025-02-04 RX ORDER — ACETAMINOPHEN 160 MG/5ML
650 SUSPENSION ORAL EVERY 6 HOURS
Refills: 0 | Status: DISCONTINUED | OUTPATIENT
Start: 2025-02-04 | End: 2025-02-06

## 2025-02-04 RX ADMIN — VALACYCLOVIR 1000 MILLIGRAM(S): 1000 TABLET ORAL at 05:54

## 2025-02-04 RX ADMIN — Medication 1 LOZENGE: at 21:17

## 2025-02-04 RX ADMIN — LAMOTRIGINE 100 MILLIGRAM(S): 100 TABLET ORAL at 17:36

## 2025-02-04 RX ADMIN — LIDOCAINE HYDROCHLORIDE 1 PATCH: 30 CREAM TOPICAL at 05:53

## 2025-02-04 RX ADMIN — ACETAMINOPHEN 650 MILLIGRAM(S): 160 SUSPENSION ORAL at 19:07

## 2025-02-04 RX ADMIN — SEVELAMER CARBONATE 800 MILLIGRAM(S): 800 TABLET, FILM COATED ORAL at 05:54

## 2025-02-04 RX ADMIN — GABAPENTIN 150 MILLIGRAM(S): 800 TABLET ORAL at 06:10

## 2025-02-04 RX ADMIN — GABAPENTIN 150 MILLIGRAM(S): 800 TABLET ORAL at 17:34

## 2025-02-04 RX ADMIN — ANTISEPTIC SURGICAL SCRUB 1 APPLICATION(S): 0.04 SOLUTION TOPICAL at 06:28

## 2025-02-04 RX ADMIN — Medication 1 APPLICATION(S): at 17:36

## 2025-02-04 RX ADMIN — Medication 7500 UNIT(S): at 21:15

## 2025-02-04 RX ADMIN — LURASIDONE HYDROCHLORIDE 40 MILLIGRAM(S): 80 TABLET, FILM COATED ORAL at 21:15

## 2025-02-04 RX ADMIN — ACETAMINOPHEN 650 MILLIGRAM(S): 160 SUSPENSION ORAL at 17:35

## 2025-02-04 RX ADMIN — SEVELAMER CARBONATE 800 MILLIGRAM(S): 800 TABLET, FILM COATED ORAL at 21:15

## 2025-02-04 RX ADMIN — ATORVASTATIN CALCIUM 20 MILLIGRAM(S): 80 TABLET, FILM COATED ORAL at 21:16

## 2025-02-04 RX ADMIN — LIDOCAINE HYDROCHLORIDE 1 PATCH: 30 CREAM TOPICAL at 07:52

## 2025-02-04 RX ADMIN — Medication 1 APPLICATION(S): at 05:54

## 2025-02-04 RX ADMIN — IPRATROPIUM BROMIDE AND ALBUTEROL SULFATE 3 MILLILITER(S): .5; 2.5 SOLUTION RESPIRATORY (INHALATION) at 00:18

## 2025-02-04 RX ADMIN — LAMOTRIGINE 100 MILLIGRAM(S): 100 TABLET ORAL at 05:54

## 2025-02-04 RX ADMIN — IPRATROPIUM BROMIDE AND ALBUTEROL SULFATE 3 MILLILITER(S): .5; 2.5 SOLUTION RESPIRATORY (INHALATION) at 12:34

## 2025-02-04 RX ADMIN — POLYETHYLENE GLYCOL 3350 17 GRAM(S): 17 POWDER, FOR SOLUTION ORAL at 12:34

## 2025-02-04 RX ADMIN — LIDOCAINE HYDROCHLORIDE 1 PATCH: 30 CREAM TOPICAL at 16:27

## 2025-02-04 RX ADMIN — PANTOPRAZOLE 40 MILLIGRAM(S): 20 TABLET, DELAYED RELEASE ORAL at 12:35

## 2025-02-04 RX ADMIN — ESCITALOPRAM 15 MILLIGRAM(S): 10 TABLET, FILM COATED ORAL at 12:35

## 2025-02-04 RX ADMIN — IPRATROPIUM BROMIDE AND ALBUTEROL SULFATE 3 MILLILITER(S): .5; 2.5 SOLUTION RESPIRATORY (INHALATION) at 05:54

## 2025-02-04 RX ADMIN — LACOSAMIDE 120 MILLIGRAM(S): 200 TABLET, FILM COATED ORAL at 18:25

## 2025-02-04 RX ADMIN — LEVETIRACETAM 400 MILLIGRAM(S): 750 TABLET, FILM COATED ORAL at 06:00

## 2025-02-04 RX ADMIN — Medication 7500 UNIT(S): at 06:29

## 2025-02-04 RX ADMIN — SEVELAMER CARBONATE 800 MILLIGRAM(S): 800 TABLET, FILM COATED ORAL at 14:20

## 2025-02-04 RX ADMIN — IPRATROPIUM BROMIDE AND ALBUTEROL SULFATE 3 MILLILITER(S): .5; 2.5 SOLUTION RESPIRATORY (INHALATION) at 17:36

## 2025-02-04 RX ADMIN — Medication 7500 UNIT(S): at 14:20

## 2025-02-04 RX ADMIN — VALACYCLOVIR 1000 MILLIGRAM(S): 1000 TABLET ORAL at 17:35

## 2025-02-04 RX ADMIN — LACOSAMIDE 120 MILLIGRAM(S): 200 TABLET, FILM COATED ORAL at 06:09

## 2025-02-04 RX ADMIN — LEVETIRACETAM 400 MILLIGRAM(S): 750 TABLET, FILM COATED ORAL at 17:36

## 2025-02-04 RX ADMIN — Medication 2 TABLET(S): at 21:15

## 2025-02-04 NOTE — PROGRESS NOTE ADULT - ASSESSMENT
69M w/ hx of metastatic testicular cancer (s/p L orchiectomy, on etoposide/cisplatin chemo), epilepsy (grand-mal seizures), schizophrenia, developmental delay, HTN, HLD, VAZQUEZ, stage III CKD, severe obesity, secondary hyperparathyroidism, anemia, thrombocytopenia who presented to ED for fall, possible seizure prior.    Plan:    # Hypoxia: Sepsis/sirs, AHRF possible flash pulmonary edema iso uncontrolled HTN, c/f aspiration pneumonia : Improving  - S/p RRT 1/23 am and 1/23 pm, for inc WOB on AVAPS, s/p intubation and transfer to MICU, required pressor support-> downgraded 1/29  - Sepsis protocol initiated  - CXR w/ mild pulm congestion.  - Diuresis as tolerated  - Thyroid US w/ nodule  - NGT in place, c/w TF's as tolerated-> FEEST -> rec pureed /mod thick liquids   - 1/25 CXR with R infiltrate, Bcx remain NGTD, WBC down  - 1/25 Scx with rare Pseudomonas aeruginosa -pansensitive   - MRI brain with metastatic disease  - 1/28 s/p extubation   - Discontinued zosyn --completed 7d course 1/29  - Monitor off antibiotics per ID rec's  - ID and Pulm following    # Syncopal seizure: Improved, downgraded  - Patient with hx of epilepsy, generalized grand mal seizures  - C/w Lamictal and Keppra  - CTH/C spine/MF negative for acute ICH, notable for mild R hematoma  - 1/17 sp multiple RRTs  for grand mal seizure activity and was initially recommended sepsis workup and antipyretics given T103F, but patient had x2 more episode and was ultimately given ativan 2mg, vimpat load 200mg, and intubated for airway protection with MICU transfer.   - Extubated 1/20  - Keppra 1.5g BID, Lamotrigine 100mg BID, Gabapentin 300mg BID, Vimpat 150mg BID  - Ativan for seizures PRM  - Per Neuro -> MRI brain w and wo contrast to look for potential seizure foci that could cause increased frequency especially given hx of metastatic testicular cancer; deferred MRI given copious secretions   - s/p vEEG, negative for seizures  - Care per MICU-> downgraded 1/21, back to ICU 1/23 for Hypoxia, Increased WOB, Downgraded 1/29    # Brain lesion:  - MRI brain now with 5.4 mm enhancing lesion in the LEFT middle cerebellar peduncle with associated edema suspicious for metastases  - MRI Lumbar negative for acute disease  - Neurology recs noted, new lesion not likely to cause seizure  - Seen by Heme Onc-> will need another PET-CT, can be completed outpatient once stable if concern can proceed with biopsy at that time   - Heme Onc following    # Flu:  - sp Tamiflu completed 5d on 1/22   - Monitor temps/WBC  - ID following    # CKD3:   - Monitor I/O's  - Serial Cr  - Avoid nephrotoxins  - Renally dose medications  - Renal following->  Renal fnx improving     # HTN/ HLD:  - C/w CV meds    # Pneumonia:  - Completed ceftriaxone 5d course on 1/21  - Retreated for aspiration pneumonia in ICU on 1/23  - ID following    # Schizophrenia/Depression:  - C/w current meds    # VAZQUEZ:  - CPAP at night    # Hx of metastatic testicular cancer, s/p L orchiectomy, on etoposide/cisplatin chemo, last dose on 6/21/24:  - Outpt Oncology follow-up    # DVT ppx:  - Heparin sq    Dispo: [facility]    [facility]  [phone]

## 2025-02-04 NOTE — PROGRESS NOTE ADULT - SUBJECTIVE AND OBJECTIVE BOX
[facility]   [phone]  RENAL FOLLOW UP NOTE  --------------------------------------------------------------------------------  Chief Complaint:    24 hour events/subjective:    patient seen and examined  denies worsening sob    PAST HISTORY  --------------------------------------------------------------------------------  No significant changes to PMH, PSH, FHx, SHx, unless otherwise noted    ALLERGIES & MEDICATIONS  --------------------------------------------------------------------------------  Allergies    penicillin (Hives)  seasonal allergies (Unknown)    Intolerances    latex (Rash)    Standing Inpatient Medications  albuterol/ipratropium for Nebulization 3 milliLiter(s) Nebulizer every 6 hours  atorvastatin 20 milliGRAM(s) Oral at bedtime  bacitracin   Ointment 1 Application(s) Topical two times a day  chlorhexidine 2% Cloths 1 Application(s) Topical <User Schedule>  chlorhexidine 2% Cloths 1 Application(s) Topical <User Schedule>  escitalopram 15 milliGRAM(s) Oral daily  gabapentin Solution 150 milliGRAM(s) Oral two times a day  heparin   Injectable 7500 Unit(s) SubCutaneous every 8 hours  influenza  Vaccine (HIGH DOSE) 0.5 milliLiter(s) IntraMuscular once  lacosamide IVPB 100 milliGRAM(s) IV Intermittent every 12 hours  lamoTRIgine 100 milliGRAM(s) Oral two times a day  levETIRAcetam  IVPB 750 milliGRAM(s) IV Intermittent two times a day  lidocaine   4% Patch 1 Patch Transdermal daily  lurasidone 40 milliGRAM(s) Oral daily  pantoprazole  Injectable 40 milliGRAM(s) IV Push daily  polyethylene glycol 3350 17 Gram(s) Oral daily  senna 2 Tablet(s) Oral at bedtime  sevelamer carbonate 800 milliGRAM(s) Oral three times a day  valACYclovir 1000 milliGRAM(s) Oral every 12 hours    PRN Inpatient Medications  nystatin Powder 1 Application(s) Topical three times a day PRN      REVIEW OF SYSTEMS  --------------------------------------------------------------------------------    Gen: denies fevers/chills,  CVS: denies chest pain/palpitations  Resp: denies worsening SOB/Cough+  GI: Denies N/V/Abd pain  : Denies dysuria/oliguria/hematuria    VITALS/PHYSICAL EXAM  --------------------------------------------------------------------------------  T(C): 36.8 (02-04-25 @ 00:23), Max: 37.1 (02-03-25 @ 15:00)  HR: 97 (02-04-25 @ 14:27) (75 - 97)  BP: 153/81 (02-04-25 @ 08:15) (126/78 - 176/82)  RR: 18 (02-04-25 @ 08:15) (18 - 19)  SpO2: 98% (02-04-25 @ 14:27) (95% - 100%)  Wt(kg): --        02-03-25 @ 07:01  -  02-04-25 @ 07:00  --------------------------------------------------------  IN: 0 mL / OUT: 1350 mL / NET: -1350 mL      Physical Exam:  	              Gen: comfortable appearing  on O2   	Pulm: decrease bs  no rales  +coarse, no wheezing  	CV: tachy  S1S2; no rub  	Abd: hypoactive bs soft distended  	UE: Warm, no cyanosis  no clubbing,  no edema;   	LE: Warm, no cyanosis  no clubbing, no edema  	Neuro:  alert and oriented    LABS/STUDIES  --------------------------------------------------------------------------------              8.5    7.09  >-----------<  192      [02-04-25 @ 06:35]              27.4     142  |  110  |  61  ----------------------------<  91      [02-04-25 @ 06:34]  5.1   |  19  |  2.30        Ca     9.8     [02-04-25 @ 06:34]    TPro  6.6  /  Alb  3.5  /  TBili  0.4  /  DBili  x   /  AST  52  /  ALT  40  /  AlkPhos  128  [02-03-25 @ 07:19]          Creatinine Trend:  SCr 2.30 [02-04 @ 06:34]  SCr 2.12 [02-03 @ 07:19]  SCr 2.52 [02-02 @ 12:37]  SCr 2.96 [02-01 @ 06:51]  SCr 3.46 [01-31 @ 06:59]              TSH 0.87      [01-25-25 @ 11:31]

## 2025-02-04 NOTE — PROGRESS NOTE ADULT - SUBJECTIVE AND OBJECTIVE BOX
[facility] IN[facility]IENT PROGRESS NOTE     Interval Hx:   02-04-25: Mr. Gr was seen at bedside today.    Meds:   MEDICATIONS  (STANDING):  albuterol/ipratropium for Nebulization 3 milliLiter(s) Nebulizer every 6 hours  atorvastatin 20 milliGRAM(s) Oral at bedtime  bacitracin   Ointment 1 Application(s) Topical two times a day  chlorhexidine 2% Cloths 1 Application(s) Topical <User Schedule>  chlorhexidine 2% Cloths 1 Application(s) Topical <User Schedule>  escitalopram 15 milliGRAM(s) Oral daily  gabapentin Solution 150 milliGRAM(s) Oral two times a day  heparin   Injectable 7500 Unit(s) SubCutaneous every 8 hours  influenza  Vaccine (HIGH DOSE) 0.5 milliLiter(s) IntraMuscular once  lacosamide IVPB 100 milliGRAM(s) IV Intermittent every 12 hours  lamoTRIgine 100 milliGRAM(s) Oral two times a day  levETIRAcetam  IVPB 750 milliGRAM(s) IV Intermittent two times a day  lidocaine   4% Patch 1 Patch Transdermal daily  lurasidone 40 milliGRAM(s) Oral daily  pantoprazole  Injectable 40 milliGRAM(s) IV Push daily  polyethylene glycol 3350 17 Gram(s) Oral daily  senna 2 Tablet(s) Oral at bedtime  sevelamer carbonate 800 milliGRAM(s) Oral three times a day  valACYclovir 1000 milliGRAM(s) Oral every 12 hours    MEDICATIONS  (PRN):  nystatin Powder 1 Application(s) Topical three times a day PRN fungal infection you may see and want to treat    Vital Signs Last 24 Hrs  T(C): 36.8 (04 Feb 2025 00:23), Max: 37.1 (03 Feb 2025 15:00)  T(F): 98.2 (04 Feb 2025 00:23), Max: 98.8 (03 Feb 2025 15:00)  HR: 79 (04 Feb 2025 00:24) (64 - 92)  BP: 167/89 (04 Feb 2025 00:24) (100/63 - 176/82)  BP(mean): --  RR: 18 (04 Feb 2025 00:23) (18 - 19)  SpO2: 100% (04 Feb 2025 00:23) (95% - 100%)    Parameters below as of 04 Feb 2025 00:23  Patient On (Oxygen Delivery Method): BiPAP/CPAP    Physical Exam:  Gen: NAD  HEENT: EOMI, MMM  Chest: equal chest rise  Cardiac: regular   Abd: non distended   Neuro: AAOx2    Labs:                        9.6    7.04  )-----------( 221      ( 03 Feb 2025 07:21 )             30.7     CBC Full  -  ( 03 Feb 2025 07:21 )  WBC Count : 7.04 K/uL  RBC Count : 2.96 M/uL  Hemoglobin : 9.6 g/dL  Hematocrit : 30.7 %  Platelet Count - Automated : 221 K/uL  Mean Cell Volume : 103.7 fl  Mean Cell Hemoglobin : 32.4 pg  Mean Cell Hemoglobin Concentration : 31.3 g/dL    02-03    148[H]  |  114[H]  |  69[H]  ----------------------------<  93  4.7   |  20[L]  |  2.12[H]    Ca    9.8      03 Feb 2025 07:19    TPro  6.6  /  Alb  3.5  /  TBili  0.4  /  DBili  x   /  AST  52[H]  /  ALT  40  /  AlkPhos  128[H]  02-03      Bilirubin Total: 0.4 mg/dL (02-03-25 @ 07:19)   [facility] INPATIENT PROGRESS NOTE     Interval Hx:   02-04-25: Mr. Gr was seen at bedside today, no overnight events noted     Meds:   MEDICATIONS  (STANDING):  albuterol/ipratropium for Nebulization 3 milliLiter(s) Nebulizer every 6 hours  atorvastatin 20 milliGRAM(s) Oral at bedtime  bacitracin   Ointment 1 Application(s) Topical two times a day  chlorhexidine 2% Cloths 1 Application(s) Topical <User Schedule>  chlorhexidine 2% Cloths 1 Application(s) Topical <User Schedule>  escitalopram 15 milliGRAM(s) Oral daily  gabapentin Solution 150 milliGRAM(s) Oral two times a day  heparin   Injectable 7500 Unit(s) SubCutaneous every 8 hours  influenza  Vaccine (HIGH DOSE) 0.5 milliLiter(s) IntraMuscular once  lacosamide IVPB 100 milliGRAM(s) IV Intermittent every 12 hours  lamoTRIgine 100 milliGRAM(s) Oral two times a day  levETIRAcetam  IVPB 750 milliGRAM(s) IV Intermittent two times a day  lidocaine   4% Patch 1 Patch Transdermal daily  lurasidone 40 milliGRAM(s) Oral daily  pantoprazole  Injectable 40 milliGRAM(s) IV Push daily  polyethylene glycol 3350 17 Gram(s) Oral daily  senna 2 Tablet(s) Oral at bedtime  sevelamer carbonate 800 milliGRAM(s) Oral three times a day  valACYclovir 1000 milliGRAM(s) Oral every 12 hours    MEDICATIONS  (PRN):  nystatin Powder 1 Application(s) Topical three times a day PRN fungal infection you may see and want to treat    Vital Signs Last 24 Hrs  T(C): 36.8 (04 Feb 2025 00:23), Max: 37.1 (03 Feb 2025 15:00)  T(F): 98.2 (04 Feb 2025 00:23), Max: 98.8 (03 Feb 2025 15:00)  HR: 79 (04 Feb 2025 00:24) (64 - 92)  BP: 167/89 (04 Feb 2025 00:24) (100/63 - 176/82)  BP(mean): --  RR: 18 (04 Feb 2025 00:23) (18 - 19)  SpO2: 100% (04 Feb 2025 00:23) (95% - 100%)    Parameters below as of 04 Feb 2025 00:23  Patient On (Oxygen Delivery Method): BiPAP/CPAP    Physical Exam:  Gen: NAD  HEENT: EOMI, MMM  Chest: equal chest rise  Cardiac: regular   Abd: non distended   Neuro: AAOx2    Labs:                        9.6    7.04  )-----------( 221      ( 03 Feb 2025 07:21 )             30.7     CBC Full  -  ( 03 Feb 2025 07:21 )  WBC Count : 7.04 K/uL  RBC Count : 2.96 M/uL  Hemoglobin : 9.6 g/dL  Hematocrit : 30.7 %  Platelet Count - Automated : 221 K/uL  Mean Cell Volume : 103.7 fl  Mean Cell Hemoglobin : 32.4 pg  Mean Cell Hemoglobin Concentration : 31.3 g/dL    02-03    148[H]  |  114[H]  |  69[H]  ----------------------------<  93  4.7   |  20[L]  |  2.12[H]    Ca    9.8      03 Feb 2025 07:19    TPro  6.6  /  Alb  3.5  /  TBili  0.4  /  DBili  x   /  AST  52[H]  /  ALT  40  /  AlkPhos  128[H]  02-03      Bilirubin Total: 0.4 mg/dL (02-03-25 @ 07:19)

## 2025-02-04 NOTE — DISCHARGE NOTE PROVIDER - NSDCCAREPROVSEEN_GEN_ALL_CORE_FT
Aneta, Salvador Live, Smiley Mart, Greyson Hernandez, Miah Jurado, Micaela Mendes, Dariel Andrew, Dao Mendosa

## 2025-02-04 NOTE — DISCHARGE NOTE PROVIDER - CARE PROVIDER_API CALL
Maikel Gardner  Internal Medicine  [address] [place] [address], [address]  [place] [address]  Phone: [phone]  Fax: [phone]  Follow Up Time:     CHAPIN BRICENO  [address] [place] [address]  Phone: [phone]  Fax: [phone]  Follow Up Time:    Maikel Gardner  Internal Medicine  [address] [place] [address], [address]  [place] [address]  Phone: [phone]  Fax: [phone]  Follow Up Time:     CHAPIN BRICENO  [address] [facility] [place] [address]  Phone: [phone]  Fax: [phone]  Follow Up Time:     Ekaterina Gillis  Pulmonary Disease  [address] [place] [address], [address]  [place] [address]  Phone: [phone]  Fax: [phone]  Follow Up Time:

## 2025-02-04 NOTE — PROGRESS NOTE ADULT - ASSESSMENT
Patient is a 69 year old male with PMH of metastatic testicular cancer (s/p L orchiectomy, on etoposide/cisplatin chemo, last dose 6/2024), epilepsy (grand-mal seizures), schizophrenia, developmental delay, HTN, HLD, VAZQUEZ, stage III CKD, severe obesity, secondary hyperparathyroidism, anemia, thrombocytopenia who presented to ED for fall, possible seizure prior. Patient reported cough for few weeks with no other respiratory symptoms.   Admitted for further work up for syncope, c/f seizure   Influenza A  1/17 s/p RRTs for grand mal seizure activity, s/p intubation and transfer to MICU  - 1/17 CT chest with anterior bowing of posterior tracheal wall suggestive of tracheomalacia, trace R pleural effusion   - MRSA PCR screen negative    - 1/18 sputum culture negative    - s/p extubation 1/19   - 1/20 Bcx negative    - s/p ceftriaxone 1/17-1/21   - s/p tamiflu 1/17-1/21    Sepsis/sirs, AHRF possible flash pulmonary edema iso uncontrolled HTN, c/f aspiration pneumonia   - 1/23 s/p RRT am for hypoxia/inc WOB, febrile last night 100.6F and now 101F this am, tachycardia, leukocytosis 11k   - repeat COVID/Flu/RSV with known influenza A-likely shedding   - 1/23 afternoon s/p RRT for inc WOB on AVAPS, s/p intubation and transfer to MICU, required pressor support  - MRSA PCR screen negative   - 1/24 afebrile overnight, WBC uptrending, weaned off pressor earlier in am, on zosyn   - 1/25 CXR with R infiltrate, Bcx remain NGTD, WBC down  - 1/25 Scx with rare Pseudomonas aeruginosa -pansensitive   - MRI brain with metastatic disease, thyroid us with nodule  - 1/26 CT spine with no acute abn of spine, stable postop changes and hardware; noted with multifocal lung abn with bibasilar atelectasis or consolidation and patchy airspace opacities in the RUL  - 1/28 s/p extubation   - 1/30 CT chest with bronchial secretions with complete RML/RLL consolidation/collapse, scattered b/l upper lobe ggo, no RUL mass noted, possible resolving pneumonia   - reportedly h/o penicillin allergy--tolerating zosyn, removed from allergy list   - remains afebrile, WBC wnl, on NC, nontoxic appearing with no new complaints, completed antibiotics 1/30 am     s/p cefepime 1/23  s/p zosyn 1/23-1/30    Recommendations:   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  [facility]  Available on [employer] TEAMS - *PREFERRED*  [phone]  After 5pm on weekdays and all day on weekends - please call [phone]     Thank you for consulting us and involving us in the management of this patients case. In addition to reviewing history, imaging, documents, labs, microbiology, took into account antibiotic stewardship, local antibiogram and infection control strategies and potential transmission issues.

## 2025-02-04 NOTE — DISCHARGE NOTE PROVIDER - HOSPITAL COURSE
HPI:  69M w/ hx of metastatic testicular cancer (s/p L orchiectomy, on etoposide/cisplatin chemo, last dose 6/2024), epilepsy (grand-mal seizures), schizophrenia, developmental delay, HTN, HLD, VAZQUEZ, stage III CKD, severe obesity, secondary hyperparathyroidism, anemia, thrombocytopenia who presented to ED for fall, possible seizure prior. Sister at bedside providing history as well. Patient reports last seizure 3 years ago, is adherent with medications (on lamotrigine and keppra), states he believes he had a seizure at home while ambulating to get mail from his mailbox. States he doesn't typically have an aura, just 'blacks out' for about 5 minutes. Notes this time was found by neighbor with abrasion to forehead, R cheek. Unsure time down, denies tongue-biting, b/b incontinence. Normally ambulates with cane. Per ED triage note, on EMS arrival, appeared in post-ictal state. Endorsing R sided chest pressure, and neck tenderness post fall. States has been in normal state of health, eating/drinking well, did have some hot flashes/chills last night, but did not take temperature. Denies any f/c, cough, SOB, rhinorrhea, sore throat, or any other acute sx.     In ED received 100 mg lamotrigine, keppra 1g. CTH/C spine/MF largely unremarkable apart from  mild right supraorbital and premaxillary/buccal soft tissue swelling/hematomas. CXR without focal consolidation. CBC and BMP largely unremarkable; noted to be influenza positive. Trop elevated for which cardiology was consulted; recommended TTE. Medicine called for admission.   (16 Jan 2025 17:27)    Hospital Course:   Metastatic testicular cancer status post left orchiectomy, undergoing chemotherapy with etoposide/cisplatin, last dose taken in June 2024.  Epilepsy, characterized by grand mal seizures.  Schizophrenia.  Developmental delay.  Hypertension (HTN).  Hyperlipidemia (HLD).  Obstructive Sleep Apnea (VAZQUEZ).  Stage III Chronic Kidney Disease (CKD).  Secondary hyperparathyroidism.  Anemia.  Thrombocytopenia.  Presenting Complaint: Patient presented to the emergency department following a fall, potentially precipitated by a seizure.    Hospital Course: On January 17, the patient experienced multiple grand mal seizures requiring rapid response interventions and administration of Ativan 2mg and Vimpat 200mg load. Due to compromised airway protection, he was intubated and transferred to the Medical Intensive Care Unit (MICU). Subsequent extubation occurred on January 19, followed by support using AVAPS due to increased pulmonary secretion. Respiratory and Infectious Issues: Developed respiratory complications marked by hypoxia and fevers leading to a recurrent need for respiratory support and re-intubation. Diagnostic imaging and cultures indicated multifocal pneumonia and aspiration risk, primarily treated with broad-spectrum antibiotics and supportive measures including bronchodilators and chest physiotherapy. Patient was seen by a pulmonologist whom recommended AVAPS at bedtime. Oncological Concerns: Imaging and subsequent oncological follow-up showed ambiguous findings including a new brain lesion and an atypical lung infiltration suggesting potential metastasis, necessitating re-evaluation through PET-CT planned as outpatient follow-up.    Renal and Metabolic Management: Managed CKD (chronic kidney disease)  with careful fluid and electrolyte balancing, improving . Struggled with hypernatremia and related electrolyte imbalances managed with tailored IV fluid therapy.    Neurological Evaluation: MRI indicated brain lesions potentially unrelated to primary cancer, requiring neurology assessment to discern potential implications for seizure activity. Ongoing neurological surveillance recommended. Anticonvulsants as per neurology guidance.  Patient was seen by pulmonary and advised AVAPS upon discharge .  Oncological treatments and monitoring as per oncology/hematology consult.  Antibiotics completed, tailored support for possible ongoing infections.  Renal protective strategies and strict monitoring of kidney function.  Progressive and vigilant outpatient monitoring is crucial given the complex interplay of the patient’s chronic conditions alongside acute hospital events.  Emphasis on adherence to medical follow-ups, medication regimes, and lifestyle adjustments as instructed during inpatient education sessions.  Preparedness for potential rapid health status changes given the underlying cancer diagnosis and multi-system involvement.      Important Medication Changes and Reason: see medication list    Active or Pending Issues Requiring Follow-up: Close outpatient follow-up with oncology for further evaluation of neoplastic disease and management planning.  Neurology follow-up for cerebral lesion interpretation and seizure management adjustments.  Vigilant monitoring of respiratory status and pulmonary care continuation with a pulmonary specialist.  Nephrology follow-up to ensure stability and management of CKD.  Continue supportive therapies for chronic conditions including anemia and thrombocytopenia.  Comprehensive patient and family education regarding signs of infection, seizure precautions, and when to seek immediate healthcare intervention.      Advanced Directives:   [ x] Full code  [ ] DNR  [ ] Hospice    Discharge Diagnoses:  Metastatic testicular cancer status post left orchiectomy, undergoing chemotherapy with etoposide/cisplatin, last dose taken in June 2024.  Epilepsy, characterized by grand mal seizures.  Schizophrenia.  Developmental delay.  Hypertension (HTN).  Hyperlipidemia (HLD).  Obstructive Sleep Apnea (VAZQUEZ).  Stage III Chronic Kidney Disease (CKD).  Secondary hyperparathyroidism.  Anemia.  Thrombocytopenia.

## 2025-02-04 NOTE — PROGRESS NOTE ADULT - SUBJECTIVE AND OBJECTIVE BOX
Date of Service: 02-04-25 @ 15:33    Patient is a 69y old  Male who presents with a chief complaint of seizure, flu, elevated trop (04 Feb 2025 14:36)      Any change in ROS: seems to be doing  ok : no sob:  on oxygen : :  he seems ok; alert and awake;     MEDICATIONS  (STANDING):  albuterol/ipratropium for Nebulization 3 milliLiter(s) Nebulizer every 6 hours  atorvastatin 20 milliGRAM(s) Oral at bedtime  bacitracin   Ointment 1 Application(s) Topical two times a day  chlorhexidine 2% Cloths 1 Application(s) Topical <User Schedule>  chlorhexidine 2% Cloths 1 Application(s) Topical <User Schedule>  escitalopram 15 milliGRAM(s) Oral daily  gabapentin Solution 150 milliGRAM(s) Oral two times a day  heparin   Injectable 7500 Unit(s) SubCutaneous every 8 hours  influenza  Vaccine (HIGH DOSE) 0.5 milliLiter(s) IntraMuscular once  lacosamide IVPB 100 milliGRAM(s) IV Intermittent every 12 hours  lamoTRIgine 100 milliGRAM(s) Oral two times a day  levETIRAcetam  IVPB 750 milliGRAM(s) IV Intermittent two times a day  lidocaine   4% Patch 1 Patch Transdermal daily  lurasidone 40 milliGRAM(s) Oral daily  pantoprazole  Injectable 40 milliGRAM(s) IV Push daily  polyethylene glycol 3350 17 Gram(s) Oral daily  senna 2 Tablet(s) Oral at bedtime  sevelamer carbonate 800 milliGRAM(s) Oral three times a day  valACYclovir 1000 milliGRAM(s) Oral every 12 hours    MEDICATIONS  (PRN):  nystatin Powder 1 Application(s) Topical three times a day PRN fungal infection you may see and want to treat    Vital Signs Last 24 Hrs  T(C): 36.8 (04 Feb 2025 00:23), Max: 36.8 (04 Feb 2025 00:23)  T(F): 98.2 (04 Feb 2025 00:23), Max: 98.2 (04 Feb 2025 00:23)  HR: 97 (04 Feb 2025 14:27) (75 - 97)  BP: 120/80 (04 Feb 2025 12:46) (120/80 - 176/82)  BP(mean): --  RR: 18 (04 Feb 2025 08:15) (18 - 18)  SpO2: 98% (04 Feb 2025 14:27) (95% - 100%)    Parameters below as of 04 Feb 2025 12:46  Patient On (Oxygen Delivery Method): nasal cannula  O2 Flow (L/min): 3      I&O's Summary    03 Feb 2025 07:01  -  04 Feb 2025 07:00  --------------------------------------------------------  IN: 0 mL / OUT: 1350 mL / NET: -1350 mL          Physical Exam:   GENERAL: NAD, well-groomed, well-developed  HEENT: BREE/   Atraumatic, Normocephalic  ENMT: No tonsillar erythema, exudates, or enlargement; Moist mucous membranes, Good dentition, No lesions  NECK: Supple, No JVD, Normal thyroid  CHEST/LUNG: bibasilar crackles  CVS: Regular rate and rhythm; No murmurs, rubs, or gallops  GI: : Soft, Nontender, Nondistended; Bowel sounds present  NERVOUS SYSTEM:  Alert & Oriented X3  EXTREMITIES: -  SKIN: No rashes or lesions  ENDOCRINOLOGY: No Thyromegaly  PSYCH: calm    Labs:  24                            8.5    7.09  )-----------( 192      ( 04 Feb 2025 06:35 )             27.4                         9.6    7.04  )-----------( 221      ( 03 Feb 2025 07:21 )             30.7                         10.1   7.50  )-----------( 254      ( 01 Feb 2025 06:51 )             32.7     02-04    142  |  110[H]  |  61[H]  ----------------------------<  91  5.1   |  19[L]  |  2.30[H]  02-03    148[H]  |  114[H]  |  69[H]  ----------------------------<  93  4.7   |  20[L]  |  2.12[H]  02-02    145  |  114[H]  |  83[H]  ----------------------------<  156[H]  4.5   |  18[L]  |  2.52[H]  02-01    151[H]  |  116[H]  |  101[H]  ----------------------------<  104[H]  4.5   |  20[L]  |  2.96[H]    Ca    9.8      04 Feb 2025 06:34  Ca    9.8      03 Feb 2025 07:19    TPro  6.6  /  Alb  3.5  /  TBili  0.4  /  DBili  x   /  AST  52[H]  /  ALT  40  /  AlkPhos  128[H]  02-03  TPro  6.7  /  Alb  3.3  /  TBili  0.3  /  DBili  x   /  AST  55[H]  /  ALT  38  /  AlkPhos  133[H]  02-02    CAPILLARY BLOOD GLUCOSE          LIVER FUNCTIONS - ( 03 Feb 2025 07:19 )  Alb: 3.5 g/dL / Pro: 6.6 g/dL / ALK PHOS: 128 U/L / ALT: 40 U/L / AST: 52 U/L / GGT: x             Urinalysis Basic - ( 04 Feb 2025 06:34 )    Color: x / Appearance: x / SG: x / pH: x  Gluc: 91 mg/dL / Ketone: x  / Bili: x / Urobili: x   Blood: x / Protein: x / Nitrite: x   Leuk Esterase: x / RBC: x / WBC x   Sq Epi: x / Non Sq Epi: x / Bacteria: x        rad< from: CT Chest No Cont (01.30.25 @ 13:37) >  CHEST WALL AND BONES: Thoracic spinal fusion hardware. No aggressive   osseous lesion. Old right clavicular and bilateral anterior rib fractures.    IMPRESSION:  New secretions at the level of the bronchus intermedius with complete   right middle/lower bilobar consolidation/collapse.    New scattered bilateral upper lobe groundglass opacities, concerning for   infection.    --- End of Report ---          NORMA MENDEZ MD; Resident Radiologist  This document has been electronically signed.  SALONI CUELLAR M.D., Attending Radiologist  This document has been electronically signed. Jan 30 2025  4:03PM    < end of copied text >      RECENT CULTURES:        RESPIRATORY CULTURES:          Studies  Chest X-RAY  CT SCAN Chest   Venous Dopplers: LE:   CT Abdomen  Others

## 2025-02-04 NOTE — DISCHARGE NOTE PROVIDER - NSDCCPCAREPLAN_GEN_ALL_CORE_FT
PRINCIPAL DISCHARGE DIAGNOSIS  Diagnosis: Seizure  Assessment and Plan of Treatment: Patient initially with syncopal event in the setting of influenza A infection, likely from toxic/metabolic or peripheral process in setting of acute medical issues. Cardiac etiology also a possibility. Seizure cannot be entirely excluded but much lower on the differential; if it did happen would favor provoked in the above setting. He had seizure and was transferred to MICU earlier this admission and went to floor and then had respiratory distress due to sepsis and he was intubated and brought back to MICU (no seizure at that time). New MRI Brain with evidence of metastatic lesion in L cerebellar peduncle, however this would not act as a nidus for potential seizure based on location.  Recommendations:   - Continue current ASMs - Keppra 1.5g PO/IV BID, lamotrigine 100mg PO BID, gabapentin 300mg PO BID, Vimpat 100mg PO/IV BID        SECONDARY DISCHARGE DIAGNOSES  Diagnosis: Stage 3 chronic kidney disease  Assessment and Plan of Treatment: Avoid taking (NSAIDs) - (ex: Ibuprofen, Advil, Celebrex, Naprosyn)  Avoid taking any nephrotoxic agents (can harm kidneys) - Intravenous contrast for diagnostic testing, combination cold medications.  Have all medications adjusted for your renal function by your Health Care Provider.  Blood pressure control is important.  Take all medication as prescribed.      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 Ovalles 12/03/2024, markers continued to be negative  - See above in regards to brain lesion  - MRI Neck shows 4.2 cm RIGHT upper lobe mass/infiltrate  - Recommend IR guided biopsy of RUL mass if PET-CT concordant/suggestive of malignancy, PET-CT as outpt and then consideration after better characterization   - Repeat CT chest w/o contrast noted with new secretions at the level of the bronchus intermedius with complete right middle/lower bilobar consolidation/collapse and new scattered bilateral upper lobe groundglass opacities, concerning for infection  - Discussed with pts wife and discussed with primary Oncologist Dr. Ovalles, agree with current plan  - Follow up as outpatient with Franki Ovalles MD   Patient will need a PET scan under the advisement of Dr Ovalles       Diagnosis: VAZQUEZ on CPAP  Assessment and Plan of Treatment: patient was seen by pulmonary and switch VAZQUEZ to AVAPS at bedtime , outpatient follow up with Pulmonary    Diagnosis: Influenza A  Assessment and Plan of Treatment: resolved       PRINCIPAL DISCHARGE DIAGNOSIS  Diagnosis: Seizure  Assessment and Plan of Treatment: Patient initially with syncopal event in the setting of influenza A infection, likely from toxic/metabolic or peripheral process in setting of acute medical issues. Cardiac etiology also a possibility. Seizure cannot be entirely excluded but much lower on the differential; if it did happen would favor provoked in the above setting. He had seizure and was transferred to MICU earlier this admission and went to floor and then had respiratory distress due to sepsis and he was intubated and brought back to MICU (no seizure at that time). New MRI Brain with evidence of metastatic lesion in L cerebellar peduncle, however this would not act as a nidus for potential seizure based on location.  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 Ovalles 12/03/2024, markers continued to be negative  - See above in regards to brain lesion  - MRI Neck shows 4.2 cm RIGHT upper lobe mass/infiltrate  - Recommend IR guided biopsy of RUL mass if PET-CT concordant/suggestive of malignancy, PET-CT as outpt and then consideration after better characterization   - Repeat CT chest w/o contrast noted with new secretions at the level of the bronchus intermedius with complete right middle/lower bilobar consolidation/collapse and new scattered bilateral upper lobe groundglass opacities, concerning for infection  - Discussed with pts wife and discussed with primary Oncologist Dr. Ovalles, agree with current plan  - Follow up as outpatient with Franki Ovalles MD   Patient will need a PET scan under the advisement of Dr Ovalels       Diagnosis: Stage 3 chronic kidney disease  Assessment and Plan of Treatment: Avoid taking (NSAIDs) - (ex: Ibuprofen, Advil, Celebrex, Naprosyn)  Avoid taking any nephrotoxic agents (can harm kidneys) - Intravenous contrast for diagnostic testing, combination cold medications.  Have all medications adjusted for your renal function by your Health Care Provider.  Blood pressure control is important.  Take all medication as prescribed.  f/up with Nephrol       PRINCIPAL DISCHARGE DIAGNOSIS  Diagnosis: Seizure  Assessment and Plan of Treatment: Patient initially with syncopal event in the setting of influenza A infection, likely from toxic/metabolic or peripheral process in setting of acute medical issues. Cardiac etiology also a possibility. Seizure cannot be entirely excluded but much lower on the differential; if it did happen would favor provoked in the above setting. He had seizure and was transferred to MICU earlier this admission and went to floor and then had respiratory distress due to sepsis and he was intubated and brought back to MICU (no seizure at that time). New MRI Brain with evidence of metastatic lesion in L cerebellar peduncle, however this would not act as a nidus for potential seizure based on location.  Recommendations:   - Continue current ASMs - Keppra 750 mg PO/IV BID, lamotrigine 100mg PO BID, gabapentin 300mg PO BID, Vimpat 100mg PO/IV BID  f/up with PCP        SECONDARY DISCHARGE DIAGNOSES  Diagnosis: HTN (hypertension)  Assessment and Plan of Treatment: controlled, Lisinopril and Amlodipine on hold    Diagnosis: Schizophrenia  Assessment and Plan of Treatment: stable, cont current meds    Diagnosis: Influenza A  Assessment and Plan of Treatment: resolved      Diagnosis: VAZQUEZ on CPAP  Assessment and Plan of Treatment: patient was seen by pulmonary and switch VAZQUEZ to CPAP at bedtime--SETTING 12, and Fio2 30 % , outpatient follow up with Pulmonary    Diagnosis: Testicular cancer  Assessment and Plan of Treatment:   # 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 Ovalles 12/03/2024, markers continued to be negative  - See above in regards to brain lesion  - MRI Neck shows 4.2 cm RIGHT upper lobe mass/infiltrate  - Recommend IR guided biopsy of RUL mass if PET-CT concordant/suggestive of malignancy, PET-CT as outpt and then consideration after better characterization   - Repeat CT chest w/o contrast noted with new secretions at the level of the bronchus intermedius with complete right middle/lower bilobar consolidation/collapse and new scattered bilateral upper lobe groundglass opacities, concerning for infection  - Discussed with pts wife and discussed with primary Oncologist Dr. Ovalles, agree with current plan  - Follow up as outpatient with Franki Ovalles MD   Patient will need a PET scan under the advisement of Dr Ovalles       Diagnosis: Stage 3 chronic kidney disease  Assessment and Plan of Treatment: Avoid taking (NSAIDs) - (ex: Ibuprofen, Advil, Celebrex, Naprosyn)  Avoid taking any nephrotoxic agents (can harm kidneys) - Intravenous contrast for diagnostic testing, combination cold medications.  Have all medications adjusted for your renal function by your Health Care Provider.  Blood pressure control is important.  Take all medication as prescribed.  f/up with Nephrol

## 2025-02-04 NOTE — PROGRESS NOTE ADULT - NS ATTEND OPT1 GEN_ALL_CORE
I independently performed the documented:
I attest my time as attending is greater than 50% of the total combined time spent on qualifying patient care activities by the PA/NP and attending.
I attest my time as attending is greater than 50% of the total combined time spent on qualifying patient care activities by the PA/NP and attending.
I independently performed the documented:
I attest my time as attending is greater than 50% of the total combined time spent on qualifying patient care activities by the PA/NP and attending.
I independently performed the documented:

## 2025-02-04 NOTE — DISCHARGE NOTE PROVIDER - PROVIDER TOKENS
PROVIDER:[TOKEN:[[id number]]],PROVIDER:[TOKEN:[[id number]]] PROVIDER:[TOKEN:[[id number]]],PROVIDER:[TOKEN:[[id number]]],PROVIDER:[TOKEN:[[id number]]]

## 2025-02-04 NOTE — PROGRESS NOTE ADULT - NS ATTEND OPT1A GEN_ALL_CORE
Exam/Medical decision making
History/Exam/Medical decision making
Medical decision making

## 2025-02-04 NOTE — PROGRESS NOTE ADULT - ASSESSMENT
Mr. Gr is a 69 year old male with PMHx of seizure disorder, sleep apnea, HTN, chronic idiopathic constipation, stage III CKD, severe obesity, secondary hyperparathyroidism, anemia, thrombocytopenia, hyperlipidemia, testicular cancer s/p EP x 4 under the care of Dr. Ovalles who is admitted s/p fall in setting of possible seizure since 01/16/2025. He was intubated early in his admission due to seziures and extubated 1/21/2025 and then again intubated in setting of respiratory distress, requiring diuresis, with SHAGUFTA on CKD, in the MICU. Imaging shows possible brain metastatic disease and lung mass as well as abnormal thyroid nodule.     Former smoker, quit 14y prior, denied ETOH, drug use. Lives at home. Does not have children. Denied family history of blood disorders or malignancy.  Denied previous workplace related exposures.  Denied previous history of blood transfusions.  Denied history of thromboses.  Denied history of  requiring anticoagulation.        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  Margins were negative.  pT3Nx. CT C/A/P with few left para-aortic and left iliac chain lymph nodes largest measuring 8.1 cm left para-aortic node, bilateral subcentimeter noncalcified pulmonary nodules measuring up to 7 mm. AFP, LDH, hCG were all elevated. 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 Completed four cycles of adjuvant therapy with Cisplatin+Etoposide, with cisplatin dose reduced 50% and Etoposide 50% due to thrombocytopenia at that time. Subsequent scans 08/2024 showed resolution of disease and negative markers,         01/26/2025: continues intubated and sedated in the ICU, discussed with ICU team yesterday, pending markers, endocrinology eval noted     01/27/2025:  AFP/BHCG normal,     01/28/2025: continues in MICU intubated, off sedation, Neurology recs noted, L cerebellar lesion not likely be cause of seizure, GOC noted per palliative discussion, pts primary Oncologist aware of current events as well     01/29/2025: , awake and alert, extubated 01/28/2025, continues in MICU     01/30/2025: now on floor, bipap, no overnight events noted    01/31/2025:  repeat CT chest w/o contrast noted with new secretions at the level of the bronchus intermedius with complete right middle/lower bilobar consolidation/collapse and new scattered bilateral upper lobe groundglass opacities, concerning for infection. Discussed with pts wife and discussed with primary Oncologist Dr. Ovalles, agree with current plan    02/01/2025:  no overnight events noted, recapped hospitalization, aware of outpatient plan once he is stable, no signs of bleeding     02/02/2025:  renal function improving, HSV1 of lip positive         # Brain lesion  # Seizures  - Seizures noted, pt with hx of seizures  - MRI brain now with 5.4 mm enhancing lesion in the LEFT middle cerebellar peduncle with associated edema suspicious for metastases  - MRI Lumbar negative for acute disease  - Small lesion without mass effect or edema, recommended to correlate per neurology if foci and locus are concordant with seizure activity  - Neurology recs noted, new lesion not likely to cause seizure  - It is rare, but nonetheless not impossible, for testicular cancer to be metastatic to the brain  - He will need another PET-CT, can be completed outpatient once stable if concern can proceed with biopsy at that time   - Endocrinology eval for thyroid nodule  - AFP/BHCG normal,      # Thyroid nodule  - US Thyroid 01/24/2025 with 1.6cm complex mixed solid cystic hypoechoic nodule in the lower pole of the right thyroid lobe, TIRADS 5.  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 Ovalles 12/03/2024, markers continued to be negative  - See above in regards to brain lesion  - MRI Neck shows 4.2 cm RIGHT upper lobe mass/infiltrate  - Recommend IR guided biopsy of RUL mass if PET-CT concordant/suggestive of malignancy, PET-CT as outpt and then consideration after better characterization   - Repeat CT chest w/o contrast noted with new secretions at the level of the bronchus intermedius with complete right middle/lower bilobar consolidation/collapse and new scattered bilateral upper lobe groundglass opacities, concerning for infection  - Discussed with pts wife and discussed with primary Oncologist Dr. 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 Gr, please do not hesitate to call or text me if you have further questions or concerns.        Brayan Mart MD  [facility] [place]   Division of Hematology/Oncology  [facility] [facility], [address]  [place] [address]  P: [phone]  F: [phone]    Attestation:    ----Pt evalulated including face-to-face interaction in addition to chart review, reviewing treatment plan, and managing the patient’s chronic diagnoses as listed in the assessment----

## 2025-02-04 NOTE — PROGRESS NOTE ADULT - NS ATTEND AMEND GEN_ALL_CORE FT
Patient care and plan discussed and reviewed with Advanced Care Provider. Plan as outlined above edited by me to reflect our discussion.   In addition, I participated in    - Ordering, reviewing, and interpreting labs, testing, and imaging.  - Reviewing prior hospitalization and where necessary, outpatient records.  - Counselling and educating patient and/or family regarding interpretation of aforementioned items and plan of care.
Seen, examined with, formulated plan with and  agree with above as scribed by NP Joaquin [Gonzalo]     on O2   lungs no rales  ext no edema      SHAGUFTA   cr is improving likely stabilizing at this level   cont O2   check phos in am and if in range will dc phos binder
Patient care and plan discussed and reviewed with Advanced Care Provider. Plan as outlined above edited by me to reflect our discussion.   In addition, I participated in    - Ordering, reviewing, and interpreting labs, testing, and imaging.  - Reviewing prior hospitalization and where necessary, outpatient records.  - Counselling and educating patient and/or family regarding interpretation of aforementioned items and plan of care.
Patient seen and examined. Patient is a 69M with extensive history including metastatic testicular cancer (last chemo June 2024), developmental delay, seizure disorder on AED, schizophrenia, HTN, HLD, CKD3, obesity, and VAZQUEZ on CPAP (unknown settings) presenting initially with fall and seizures. After admission he had RRT for prolonged seizure episode requiring intubation and transfer to MICU.    #Neuro - continue AED as per neuro  - spinal fixation hardware noted on imaging  - Patient awake and appropriately interactive  #Cardiovascular - shock state resolved  - Hemodynamically stable  #Pulmonary - acute hypoxemic respiratory failure in the setting of Influenza A and seizure disorder with need for airway protection  - Extubated 1/19 and saturating well on minimal O2  - Continue airway clearance and monitoring suctioning requirements - these have decreased over the last 24 hours by nursing report and if they get to Q4 consistently then patient will be appropriate for transfer to the medical floors with pulse ox monitoring  - Aspiration precautions - check swallow evaluation  - Maintain O2 sat > 90%  - VAZQUEZ - reportedly on CPAP at home. Continue current AVAPs settings but prior to discharge will need to either have patient set up with a new machine or return to home CPAP settings prior to discharge. Will need to know what home settings are   #Gastro - oropharyngeal dysphagia  - Presently NPO awaiting swallow evaluation given concern for aspiration  #Nephro - CKD3  - Monitor urine output  #Infectious Disease - RLL consolidation  - Complete course of Tamiflu  - Complete course of Ceftriaxone for CAP  - ID evaluation noted  #Psych - reportedly on Lurasidone 40mg PO daily and Lexapro as an outpatient  - Will need to ensure on appropriate therapy as inpatient  #Onc - Metastatic testicular cancer  - CTH without evidence of brain mets  #DVT proph - HSQ  #GOC - Full Code  - Patient has family involved  - Reportedly not under the auspices of [facility] - have asked SW team to ensure this is correct      If suctioning requirements continue to improve then patient will be stable for transfer to the medical floors with continuous pulse ox monitoring later today. Patient is able to cough out his secretions this AM and is saturating well without signs of respiratory distress.
seems to be doing  ok :: no sob;   no cough : no phlegm  : getting feest:  off antibiotics: ct scan chest done just now:  ct scan chest done today to me shows right lower lobe atelectasis with small effusion : this was not present on 17 th ct scan chest  :  cont BD  : await official report : he already  finished the treatment for pneumonia
pt seen and examined:  he seems stable   resp wise:  no RUL mass: cont to mantain o2 sao2 > 90%: cont current rx
pt seen and examined:  intubated and sedated  on antibiotics for likely aspiration event; : cont supportive care in MICU

## 2025-02-04 NOTE — PROGRESS NOTE ADULT - ASSESSMENT
68 y/o M with PMH of metastatic testicular cancer (s/p L orchiectomy, on etoposide/cisplatin chemo, last dose 6/2024), epilepsy (grand-mal seizures), schizophrenia, developmental delay, HTN, HLD, VAZQUEZ, stage III CKD, severe obesity, secondary hyperparathyroidism, anemia, thrombocytopenia who presented to ED for fall, possible seizure prior. On 1/17 patient had multiple RRTs called for grand mal seizure activity and  patient had x2 more episode and was ultimately given ativan 2mg, Vimpat load 200mg, and intubated for airway protection with MICU transfer. Extubated 1/19 - to AVAPS, post extubation with increased secretion. Downgraded to medical floor 1/21. Pt still with fevers, s/p RRT 1/23 AM for fever, hypoxia.  AVAPS - O2 sats 98% but tachypneic to 40, tachycardic, pt endorsing SOB. RRT ---> intubated for respiratory failure. also febrile and Hypertensive. CKD      1- CKD III  with SHAGUFTA   2- CHF   3- fevers  4- tachycardia  5- respiratory failure s/p extubation   6- HTN   7- hypernatremia     SHAGUFTA suspect ischemic ATN in setting of infection and hypotension   creatinine is improving to baseline  off diuretics now   non oliguric   na in range  borderline k, low k diet  bronchodilators   still hypoxemic and cont O2 pulm following   hyperphosphatemia renvela 1 tab tid, trend phos in am  strict I/O  PT  trend bmp daily  d.w pt sister today

## 2025-02-04 NOTE — DISCHARGE NOTE PROVIDER - CARE PROVIDERS DIRECT ADDRESSES
,DirectAddress_Unknown,[email or web address] ,DirectAddress_Unknown,[email or web address]

## 2025-02-04 NOTE — PROGRESS NOTE ADULT - PROBLEM SELECTOR PLAN 1
-S/p intubation in MICU in the setting of grand mal seizures, pneumonia, Flu  -Completed course of ABX, tamiflu for Flu. Extubated to AVAPS 1/19  -S/p RRT 1/23 AM for fevers, hypoxia requiring AVAPS. Likely aspiration event +/- flash pulmonary edema  -S/p 2nd RRT 1/23 PM for increased WOB on AVAPS, intubated and tx to MICU   -Extubated to AVAPS 1/28, now using qHS   -Continue AVAPS  ePAP 5 Rate 14 Max pressure 35 Min Pressure 15 fio2 40% qHS and PRN for increased WOB for now. Pt has CPAP at home for VAZQUEZ, will need to monitor off AVAPs prior to discharge.  -Keep sats >90% with O2 PRN. Wean o2 as tolerated.   -CXR 1/29 with low lung volumes  -Repeat CT chest with bronchial secretions, RML/RLL atelectasis +/- underlying infiltrate, scattered b/l upper lobe GGO. ABX completed, ID f/u.   -Continue bronchodilators. Start Mucomyst 20% 3ml q6h x3 days (give with Duoneb)  -Chest PT   -ABG with no Co2 retention.    2/2: seems OK:  has rll atelectasis:  hopefully will improve on its own so that bronc does not have to be dne:  cont conservative rx for now:  2/3: repeat cxr:  dw sister about possibility of bronch : she is  not interested in it at this time:  2/4: cxr doi ne yesterday  /: to me looks pretty good:  I can see the border of left and right  HD:  may be atelectasis improved: In ay case the relative at bedside does not want to do any bronch on hi, at this time

## 2025-02-04 NOTE — PROGRESS NOTE ADULT - SUBJECTIVE AND OBJECTIVE BOX
SUBJECTIVE / OVERNIGHT EVENTS:          --------------------------------------------------------------------------------------------  LABS:                        8.5    7.09  )-----------( 192      ( 04 Feb 2025 06:35 )             27.4     02-03    148[H]  |  114[H]  |  69[H]  ----------------------------<  93  4.7   |  20[L]  |  2.12[H]    Ca    9.8      03 Feb 2025 07:19    TPro  6.6  /  Alb  3.5  /  TBili  0.4  /  DBili  x   /  AST  52[H]  /  ALT  40  /  AlkPhos  128[H]  02-03      CAPILLARY BLOOD GLUCOSE      POCT Blood Glucose.: 139 mg/dL (03 Feb 2025 15:07)        Urinalysis Basic - ( 03 Feb 2025 07:19 )    Color: x / Appearance: x / SG: x / pH: x  Gluc: 93 mg/dL / Ketone: x  / Bili: x / Urobili: x   Blood: x / Protein: x / Nitrite: x   Leuk Esterase: x / RBC: x / WBC x   Sq Epi: x / Non Sq Epi: x / Bacteria: x        RADIOLOGY & ADDITIONAL TESTS:    Imaging Personally Reviewed:  [x] YES  [ ] NO    Consultant(s) Notes Reviewed:  [x] YES  [ ] NO    MEDICATIONS  (STANDING):  albuterol/ipratropium for Nebulization 3 milliLiter(s) Nebulizer every 6 hours  atorvastatin 20 milliGRAM(s) Oral at bedtime  bacitracin   Ointment 1 Application(s) Topical two times a day  chlorhexidine 2% Cloths 1 Application(s) Topical <User Schedule>  chlorhexidine 2% Cloths 1 Application(s) Topical <User Schedule>  escitalopram 15 milliGRAM(s) Oral daily  gabapentin Solution 150 milliGRAM(s) Oral two times a day  heparin   Injectable 7500 Unit(s) SubCutaneous every 8 hours  influenza  Vaccine (HIGH DOSE) 0.5 milliLiter(s) IntraMuscular once  lacosamide IVPB 100 milliGRAM(s) IV Intermittent every 12 hours  lamoTRIgine 100 milliGRAM(s) Oral two times a day  levETIRAcetam  IVPB 750 milliGRAM(s) IV Intermittent two times a day  lidocaine   4% Patch 1 Patch Transdermal daily  lurasidone 40 milliGRAM(s) Oral daily  pantoprazole  Injectable 40 milliGRAM(s) IV Push daily  polyethylene glycol 3350 17 Gram(s) Oral daily  senna 2 Tablet(s) Oral at bedtime  sevelamer carbonate 800 milliGRAM(s) Oral three times a day  valACYclovir 1000 milliGRAM(s) Oral every 12 hours    MEDICATIONS  (PRN):  nystatin Powder 1 Application(s) Topical three times a day PRN fungal infection you may see and want to treat      Care Discussed with Consultants/Other Providers [x] YES  [ ] NO    Vital Signs Last 24 Hrs  T(C): 36.8 (04 Feb 2025 00:23), Max: 37.1 (03 Feb 2025 15:00)  T(F): 98.2 (04 Feb 2025 00:23), Max: 98.8 (03 Feb 2025 15:00)  HR: 76 (04 Feb 2025 07:00) (64 - 92)  BP: 167/89 (04 Feb 2025 00:24) (100/63 - 176/82)  BP(mean): --  RR: 18 (04 Feb 2025 07:00) (18 - 19)  SpO2: 98% (04 Feb 2025 07:00) (95% - 100%)    Parameters below as of 04 Feb 2025 07:00  Patient On (Oxygen Delivery Method): nasal cannula  O2 Flow (L/min): 3    I&O's Summary    03 Feb 2025 07:01  -  04 Feb 2025 07:00  --------------------------------------------------------  IN: 0 mL / OUT: 1350 mL / NET: -1350 mL    PHYSICAL EXAM:  GENERAL: NAD,   HEAD:  Atraumatic, Normocephalic,  EYES: EOMI, PERRLA, conjunctiva and sclera clear  NECK: Supple, No JVD  CHEST/LUNG: Diminished bilaterally; No wheeze  HEART: Regular rate and rhythm; No murmurs, rubs, or gallops  ABDOMEN: Soft, Nontender, Nondistended; Bowel sounds present  NEURO: AAOx3, no focal weakness, 5/5 b/l extremity strength, b/l knee no arthritis, no effusion   EXTREMITIES:  2+ Peripheral Pulses, No clubbing, cyanosis, or edema  SKIN: No rashes or lesions

## 2025-02-04 NOTE — DISCHARGE NOTE PROVIDER - NSDCMRMEDTOKEN_GEN_ALL_CORE_FT
amLODIPine 5 mg oral tablet: 1 tab(s) orally once a day  atorvastatin 20 mg oral tablet: 1 tab(s) orally once a day  cholecalciferol 25 mcg (1000 intl units) oral tablet: 1 tab(s) orally once a day  Claritin 10 mg oral tablet: 1 tab(s) orally once a day (in the afternoon)  empagliflozin 10 mg oral tablet: 1 tab(s) orally once a day  Keppra 1000 mg oral tablet: 1 tab(s) orally 2 times a day  lamoTRIgine 100 mg oral tablet: 1 tab(s) orally 2 times a day  Latuda 40 mg oral tablet: 1 tab(s) orally once a day (at bedtime)  Lexapro 10 mg oral tablet: 1 tab(s) orally once a day +5mg tablet; TOTAL: 15mg  Lexapro 5 mg oral tablet: 1 tab(s) orally once a day +10mg; TOTA: 15mg  lisinopril 5 mg oral tablet: 1 tab(s) orally once a day  Neurontin 300 mg oral capsule: 1 cap(s) orally 2 times a day   atorvastatin 20 mg oral tablet: 1 tab(s) orally once a day (at bedtime)  bacitracin 500 units/g topical ointment: 1 Apply topically to affected area 2 times a day  cholecalciferol 25 mcg (1000 intl units) oral tablet: 1 tab(s) orally once a day  escitalopram 5 mg oral tablet: 3 tab(s) orally once a day  gabapentin 250 mg/5 mL oral solution: 3 milliliter(s) orally 2 times a day  ipratropium-albuterol 0.5 mg-2.5 mg/3 mL inhalation solution: 3 milliliter(s) inhaled every 6 hours  lacosamide 100 mg oral tablet: 1 tab(s) orally 2 times a day  lamoTRIgine 100 mg oral tablet: 1 tab(s) orally 2 times a day  levETIRAcetam 750 mg oral tablet: 1 tab(s) orally 2 times a day  lidocaine 4% topical film: Apply topically to affected area once a day  lurasidone 40 mg oral tablet: 1 tab(s) orally once a day  nystatin 100,000 units/g topical powder: 1 Apply topically to affected area 3 times a day As needed fungal infection you may see and want to treat  pantoprazole 40 mg oral delayed release tablet: 1 tab(s) orally once a day (before a meal)  polyethylene glycol 3350 oral powder for reconstitution: 17 gram(s) orally once a day  senna leaf extract oral tablet: 2 tab(s) orally once a day (at bedtime)   atorvastatin 20 mg oral tablet: 1 tab(s) orally once a day (at bedtime)  bacitracin 500 units/g topical ointment: 1 Apply topically to affected area 2 times a day  cholecalciferol 25 mcg (1000 intl units) oral tablet: 1 tab(s) orally once a day  escitalopram 5 mg oral tablet: 3 tab(s) orally once a day  gabapentin 250 mg/5 mL oral solution: 3 milliliter(s) orally 2 times a day  ipratropium-albuterol 0.5 mg-2.5 mg/3 mL inhalation solution: 3 milliliter(s) inhaled every 6 hours  lacosamide 100 mg oral tablet: 1 tab(s) orally 2 times a day  lamoTRIgine 100 mg oral tablet: 1 tab(s) orally 2 times a day  levETIRAcetam 750 mg oral tablet: 1 tab(s) orally 2 times a day  lidocaine 4% topical film: Apply topically to affected area once a day  lurasidone 40 mg oral tablet: 1 tab(s) orally once a day  nystatin 100,000 units/g topical powder: 1 Apply topically to affected area 3 times a day As needed fungal infection you may see and want to treat  pantoprazole 40 mg oral delayed release tablet: 1 tab(s) orally once a day (before a meal)  polyethylene glycol 3350 oral powder for reconstitution: 17 gram(s) orally once a day  senna leaf extract oral tablet: 2 tab(s) orally once a day (at bedtime)  valACYclovir 1 g oral tablet: 1 tab(s) orally every 12 hours until 2/09/25

## 2025-02-04 NOTE — PROGRESS NOTE ADULT - SUBJECTIVE AND OBJECTIVE BOX
[facility]  JACINTO Horton Y. Patel, S. Shah, G. Casimir  [phone]  ([phone] - weekdays after 5pm and weekends)    Name: OSCAR MILAN  Age/Gender: 69y Male  MRN: [id number]    Interval History:  Patient seen and examined this morning.   No new complaints noted.  Notes reviewed  No concerning overnight events  Afebrile   Allergies: penicillin (Hives)  seasonal allergies (Unknown)      Objective:  Vitals:   T(F): 98.2 (02-04-25 @ 00:23), Max: 98.8 (02-03-25 @ 15:00)  HR: 80 (02-04-25 @ 08:15) (76 - 92)  BP: 153/81 (02-04-25 @ 08:15) (126/78 - 176/82)  RR: 18 (02-04-25 @ 08:15) (18 - 19)  SpO2: 96% (02-04-25 @ 08:15) (95% - 100%)  Physical Examination:  General: no acute distress, NC  HEENT: normocephalic, atraumatic, lip lesions dec  Respiratory: no acc muscle use, breathing comfortably  Cardiovascular: S1 and S2 present  Gastrointestinal: obese, nondistended  Extremities: no edema, no cyanosis  Skin: no visible rash    Laboratory Studies:  CBC:                       8.5    7.09  )-----------( 192      ( 04 Feb 2025 06:35 )             27.4     WBC Trend:  7.09 02-04-25 @ 06:35  7.04 02-03-25 @ 07:21  7.50 02-01-25 @ 06:51  6.09 01-31-25 @ 06:59  7.16 01-30-25 @ 07:15  7.01 01-29-25 @ 00:40    CMP: 02-04    142  |  110[H]  |  61[H]  ----------------------------<  91  5.1   |  19[L]  |  2.30[H]    Ca    9.8      04 Feb 2025 06:34    TPro  6.6  /  Alb  3.5  /  TBili  0.4  /  DBili  x   /  AST  52[H]  /  ALT  40  /  AlkPhos  128[H]  02-03    Creatinine: 2.30 mg/dL (02-04-25 @ 06:34)  Creatinine: 2.12 mg/dL (02-03-25 @ 07:19)  Creatinine: 2.52 mg/dL (02-02-25 @ 12:37)  Creatinine: 2.96 mg/dL (02-01-25 @ 06:51)  Creatinine: 3.46 mg/dL (01-31-25 @ 06:59)  Creatinine: 4.25 mg/dL (01-30-25 @ 07:12)  Creatinine: 4.71 mg/dL (01-29-25 @ 11:56)  Creatinine: 5.08 mg/dL (01-29-25 @ 00:39)  Creatinine: 4.86 mg/dL (01-28-25 @ 15:25)    LIVER FUNCTIONS - ( 03 Feb 2025 07:19 )  Alb: 3.5 g/dL / Pro: 6.6 g/dL / ALK PHOS: 128 U/L / ALT: 40 U/L / AST: 52 U/L / GGT: x           Microbiology: reviewed   Culture - Sputum (collected 01-25-25 @ 07:21)  Source: .Sputum Sputum  Gram Stain (01-26-25 @ 23:12):    Numerous polymorphonuclear leukocytes per low power field    Rare Squamous epithelial cells per low power field    No organisms seen  Final Report (01-27-25 @ 17:11):    Rare Pseudomonas aeruginosa    Commensal lucia consistent with body site  Organism: Pseudomonas aeruginosa (01-27-25 @ 17:11)  Organism: Pseudomonas aeruginosa (01-27-25 @ 17:11)      Method Type: MARIELENA      -  Aztreonam: S <=4      -  Cefepime: S <=2      -  Ceftazidime: S 4      -  Ciprofloxacin: S <=0.25      -  Imipenem: S 2      -  Levofloxacin: S <=0.5      -  Meropenem: S <=1      -  Piperacillin/Tazobactam: S <=8    Culture - Urine (collected 01-24-25 @ 17:16)  Source: Catheterized Catheterized  Final Report (01-26-25 @ 02:35):    No growth    Culture - Blood (collected 01-23-25 @ 15:14)  Source: .Blood BLOOD  Final Report (01-28-25 @ 20:00):    No growth at 5 days    Culture - Blood (collected 01-23-25 @ 15:14)  Source: .Blood BLOOD  Final Report (01-28-25 @ 20:00):    No growth at 5 days    Culture - Blood (collected 01-23-25 @ 12:21)  Source: .Blood BLOOD  Final Report (01-28-25 @ 17:00):    No growth at 5 days    Culture - Blood (collected 01-23-25 @ 12:21)  Source: .Blood BLOOD  Final Report (01-28-25 @ 17:00):    No growth at 5 days    Culture - Blood (collected 01-23-25 @ 00:15)  Source: .Blood BLOOD  Final Report (01-28-25 @ 04:01):    No growth at 5 days    Culture - Blood (collected 01-23-25 @ 00:10)  Source: .Blood BLOOD  Final Report (01-28-25 @ 04:01):    No growth at 5 days    Radiology: reviewed     Medications:  albuterol/ipratropium for Nebulization 3 milliLiter(s) Nebulizer every 6 hours  atorvastatin 20 milliGRAM(s) Oral at bedtime  bacitracin   Ointment 1 Application(s) Topical two times a day  chlorhexidine 2% Cloths 1 Application(s) Topical <User Schedule>  chlorhexidine 2% Cloths 1 Application(s) Topical <User Schedule>  escitalopram 15 milliGRAM(s) Oral daily  gabapentin Solution 150 milliGRAM(s) Oral two times a day  heparin   Injectable 7500 Unit(s) SubCutaneous every 8 hours  influenza  Vaccine (HIGH DOSE) 0.5 milliLiter(s) IntraMuscular once  lacosamide IVPB 100 milliGRAM(s) IV Intermittent every 12 hours  lamoTRIgine 100 milliGRAM(s) Oral two times a day  levETIRAcetam  IVPB 750 milliGRAM(s) IV Intermittent two times a day  lidocaine   4% Patch 1 Patch Transdermal daily  lurasidone 40 milliGRAM(s) Oral daily  nystatin Powder 1 Application(s) Topical three times a day PRN  pantoprazole  Injectable 40 milliGRAM(s) IV Push daily  polyethylene glycol 3350 17 Gram(s) Oral daily  senna 2 Tablet(s) Oral at bedtime  sevelamer carbonate 800 milliGRAM(s) Oral three times a day  valACYclovir 1000 milliGRAM(s) Oral every 12 hours    Current Antimicrobials:  valACYclovir 1000 milliGRAM(s) Oral every 12 hours    Prior/Completed Antimicrobials:  cefTRIAXone   IVPB  cefTRIAXone   IVPB  oseltamivir  piperacillin/tazobactam IVPB..  vancomycin  IVPB

## 2025-02-04 NOTE — DISCHARGE NOTE PROVIDER - NSDCFUADDAPPT_GEN_ALL_CORE_FT
APPTS ARE READY TO BE MADE: [x] YES    Best Family or Patient Contact (if needed):    Additional Information about above appointments (if needed):    1: Dawood Tuttle  2:   3:     Other comments or requests:    APPTS ARE READY TO BE MADE: [x] YES    Best Family or Patient Contact (if needed):    Additional Information about above appointments (if needed):    1: Ekaterina Gillis  2:   3:     Other comments or requests:    APPTS ARE READY TO BE MADE: [x] YES    Best Family or Patient Contact (if needed):    Additional Information about above appointments (if needed):    1: Ekaterina Gillis  2:   3:     Other comments or requests:     Patient is being discharged to [facility]. Caregiver will arrange follow up.

## 2025-02-05 LAB
BASE EXCESS BLDA CALC-SCNC: -4.2 MMOL/L — LOW (ref -2–3)
CO2 BLDA-SCNC: 22 MMOL/L — SIGNIFICANT CHANGE UP (ref 19–24)
FLUAV AG NPH QL: SIGNIFICANT CHANGE UP
FLUBV AG NPH QL: SIGNIFICANT CHANGE UP
GAS PNL BLDA: SIGNIFICANT CHANGE UP
HCO3 BLDA-SCNC: 21 MMOL/L — SIGNIFICANT CHANGE UP (ref 21–28)
HOROWITZ INDEX BLDA+IHG-RTO: 32 — SIGNIFICANT CHANGE UP
PCO2 BLDA: 38 MMHG — SIGNIFICANT CHANGE UP (ref 35–48)
PH BLDA: 7.35 — SIGNIFICANT CHANGE UP (ref 7.35–7.45)
PHOSPHATE SERPL-MCNC: 3.7 MG/DL — SIGNIFICANT CHANGE UP (ref 2.5–4.5)
PO2 BLDA: 166 MMHG — HIGH (ref 83–108)
RSV RNA NPH QL NAA+NON-PROBE: SIGNIFICANT CHANGE UP
SAO2 % BLDA: 99.1 % — HIGH (ref 94–98)
SARS-COV-2 RNA SPEC QL NAA+PROBE: SIGNIFICANT CHANGE UP

## 2025-02-05 RX ADMIN — ESCITALOPRAM 15 MILLIGRAM(S): 10 TABLET, FILM COATED ORAL at 13:27

## 2025-02-05 RX ADMIN — Medication 1 APPLICATION(S): at 06:01

## 2025-02-05 RX ADMIN — LIDOCAINE HYDROCHLORIDE 1 PATCH: 30 CREAM TOPICAL at 07:00

## 2025-02-05 RX ADMIN — ACETAMINOPHEN 650 MILLIGRAM(S): 160 SUSPENSION ORAL at 18:31

## 2025-02-05 RX ADMIN — Medication 2 TABLET(S): at 21:29

## 2025-02-05 RX ADMIN — PANTOPRAZOLE 40 MILLIGRAM(S): 20 TABLET, DELAYED RELEASE ORAL at 13:27

## 2025-02-05 RX ADMIN — Medication 7500 UNIT(S): at 23:20

## 2025-02-05 RX ADMIN — GABAPENTIN 150 MILLIGRAM(S): 800 TABLET ORAL at 17:30

## 2025-02-05 RX ADMIN — LEVETIRACETAM 400 MILLIGRAM(S): 750 TABLET, FILM COATED ORAL at 05:56

## 2025-02-05 RX ADMIN — IPRATROPIUM BROMIDE AND ALBUTEROL SULFATE 3 MILLILITER(S): .5; 2.5 SOLUTION RESPIRATORY (INHALATION) at 13:27

## 2025-02-05 RX ADMIN — LIDOCAINE HYDROCHLORIDE 1 PATCH: 30 CREAM TOPICAL at 06:01

## 2025-02-05 RX ADMIN — ANTISEPTIC SURGICAL SCRUB 1 APPLICATION(S): 0.04 SOLUTION TOPICAL at 06:22

## 2025-02-05 RX ADMIN — SEVELAMER CARBONATE 800 MILLIGRAM(S): 800 TABLET, FILM COATED ORAL at 21:29

## 2025-02-05 RX ADMIN — Medication 7500 UNIT(S): at 06:00

## 2025-02-05 RX ADMIN — IPRATROPIUM BROMIDE AND ALBUTEROL SULFATE 3 MILLILITER(S): .5; 2.5 SOLUTION RESPIRATORY (INHALATION) at 06:00

## 2025-02-05 RX ADMIN — LACOSAMIDE 120 MILLIGRAM(S): 200 TABLET, FILM COATED ORAL at 18:17

## 2025-02-05 RX ADMIN — ACETAMINOPHEN 650 MILLIGRAM(S): 160 SUSPENSION ORAL at 17:30

## 2025-02-05 RX ADMIN — VALACYCLOVIR 1000 MILLIGRAM(S): 1000 TABLET ORAL at 06:00

## 2025-02-05 RX ADMIN — LIDOCAINE HYDROCHLORIDE 1 PATCH: 30 CREAM TOPICAL at 18:00

## 2025-02-05 RX ADMIN — SEVELAMER CARBONATE 800 MILLIGRAM(S): 800 TABLET, FILM COATED ORAL at 13:27

## 2025-02-05 RX ADMIN — ATORVASTATIN CALCIUM 20 MILLIGRAM(S): 80 TABLET, FILM COATED ORAL at 21:29

## 2025-02-05 RX ADMIN — IPRATROPIUM BROMIDE AND ALBUTEROL SULFATE 3 MILLILITER(S): .5; 2.5 SOLUTION RESPIRATORY (INHALATION) at 00:55

## 2025-02-05 RX ADMIN — IPRATROPIUM BROMIDE AND ALBUTEROL SULFATE 3 MILLILITER(S): .5; 2.5 SOLUTION RESPIRATORY (INHALATION) at 18:00

## 2025-02-05 RX ADMIN — Medication 7500 UNIT(S): at 13:29

## 2025-02-05 RX ADMIN — VALACYCLOVIR 1000 MILLIGRAM(S): 1000 TABLET ORAL at 17:31

## 2025-02-05 RX ADMIN — ANTISEPTIC SURGICAL SCRUB 1 APPLICATION(S): 0.04 SOLUTION TOPICAL at 06:21

## 2025-02-05 RX ADMIN — LURASIDONE HYDROCHLORIDE 40 MILLIGRAM(S): 80 TABLET, FILM COATED ORAL at 21:29

## 2025-02-05 RX ADMIN — POLYETHYLENE GLYCOL 3350 17 GRAM(S): 17 POWDER, FOR SOLUTION ORAL at 13:29

## 2025-02-05 RX ADMIN — IPRATROPIUM BROMIDE AND ALBUTEROL SULFATE 3 MILLILITER(S): .5; 2.5 SOLUTION RESPIRATORY (INHALATION) at 23:20

## 2025-02-05 RX ADMIN — LACOSAMIDE 120 MILLIGRAM(S): 200 TABLET, FILM COATED ORAL at 05:51

## 2025-02-05 RX ADMIN — GABAPENTIN 150 MILLIGRAM(S): 800 TABLET ORAL at 06:00

## 2025-02-05 RX ADMIN — LAMOTRIGINE 100 MILLIGRAM(S): 100 TABLET ORAL at 17:31

## 2025-02-05 RX ADMIN — LAMOTRIGINE 100 MILLIGRAM(S): 100 TABLET ORAL at 06:01

## 2025-02-05 RX ADMIN — Medication 1 APPLICATION(S): at 17:31

## 2025-02-05 RX ADMIN — LEVETIRACETAM 400 MILLIGRAM(S): 750 TABLET, FILM COATED ORAL at 17:31

## 2025-02-05 RX ADMIN — SEVELAMER CARBONATE 800 MILLIGRAM(S): 800 TABLET, FILM COATED ORAL at 06:01

## 2025-02-05 NOTE — PROGRESS NOTE ADULT - SUBJECTIVE AND OBJECTIVE BOX
SUBJECTIVE / OVERNIGHT EVENTS:          --------------------------------------------------------------------------------------------  LABS:                        8.5    7.09  )-----------( 192      ( 04 Feb 2025 06:35 )             27.4     02-04    142  |  110[H]  |  61[H]  ----------------------------<  91  5.1   |  19[L]  |  2.30[H]    Ca    9.8      04 Feb 2025 06:34  Phos  3.7     02-05        CAPILLARY BLOOD GLUCOSE            Urinalysis Basic - ( 04 Feb 2025 06:34 )    Color: x / Appearance: x / SG: x / pH: x  Gluc: 91 mg/dL / Ketone: x  / Bili: x / Urobili: x   Blood: x / Protein: x / Nitrite: x   Leuk Esterase: x / RBC: x / WBC x   Sq Epi: x / Non Sq Epi: x / Bacteria: x        RADIOLOGY & ADDITIONAL TESTS:    Imaging Personally Reviewed:  [x] YES  [ ] NO    Consultant(s) Notes Reviewed:  [x] YES  [ ] NO    MEDICATIONS  (STANDING):  albuterol/ipratropium for Nebulization 3 milliLiter(s) Nebulizer every 6 hours  atorvastatin 20 milliGRAM(s) Oral at bedtime  bacitracin   Ointment 1 Application(s) Topical two times a day  chlorhexidine 2% Cloths 1 Application(s) Topical <User Schedule>  chlorhexidine 2% Cloths 1 Application(s) Topical <User Schedule>  escitalopram 15 milliGRAM(s) Oral daily  gabapentin Solution 150 milliGRAM(s) Oral two times a day  heparin   Injectable 7500 Unit(s) SubCutaneous every 8 hours  influenza  Vaccine (HIGH DOSE) 0.5 milliLiter(s) IntraMuscular once  lacosamide IVPB 100 milliGRAM(s) IV Intermittent every 12 hours  lamoTRIgine 100 milliGRAM(s) Oral two times a day  levETIRAcetam  IVPB 750 milliGRAM(s) IV Intermittent two times a day  lidocaine   4% Patch 1 Patch Transdermal daily  lurasidone 40 milliGRAM(s) Oral daily  pantoprazole  Injectable 40 milliGRAM(s) IV Push daily  polyethylene glycol 3350 17 Gram(s) Oral daily  senna 2 Tablet(s) Oral at bedtime  sevelamer carbonate 800 milliGRAM(s) Oral three times a day  valACYclovir 1000 milliGRAM(s) Oral every 12 hours    MEDICATIONS  (PRN):  acetaminophen     Tablet .. 650 milliGRAM(s) Oral every 6 hours PRN Mild Pain (1 - 3)  nystatin Powder 1 Application(s) Topical three times a day PRN fungal infection you may see and want to treat      Care Discussed with Consultants/Other Providers [x] YES  [ ] NO    Vital Signs Last 24 Hrs  T(C): 36.4 (05 Feb 2025 04:40), Max: 36.8 (04 Feb 2025 16:34)  T(F): 97.5 (05 Feb 2025 04:40), Max: 98.2 (04 Feb 2025 16:34)  HR: 75 (05 Feb 2025 04:40) (75 - 97)  BP: 147/81 (05 Feb 2025 04:40) (117/78 - 153/81)  BP(mean): --  RR: 18 (05 Feb 2025 04:40) (18 - 18)  SpO2: 95% (05 Feb 2025 04:40) (95% - 98%)    Parameters below as of 05 Feb 2025 04:40  Patient On (Oxygen Delivery Method): nasal cannula  O2 Flow (L/min): 3    I&O's Summary    04 Feb 2025 07:01  -  05 Feb 2025 07:00  --------------------------------------------------------  IN: 0 mL / OUT: 1200 mL / NET: -1200 mL      PHYSICAL EXAM:  GENERAL: NAD,   HEAD:  Atraumatic, Normocephalic,  EYES: EOMI, PERRLA, conjunctiva and sclera clear  NECK: Supple, No JVD  CHEST/LUNG: Diminished bilaterally; No wheeze  HEART: Regular rate and rhythm; No murmurs, rubs, or gallops  ABDOMEN: Soft, Nontender, Nondistended; Bowel sounds present  NEURO: AAOx3, no focal weakness, 5/5 b/l extremity strength, b/l knee no arthritis, no effusion   EXTREMITIES:  2+ Peripheral Pulses, No clubbing, cyanosis, or edema  SKIN: No rashes or lesions

## 2025-02-05 NOTE — PROGRESS NOTE ADULT - PROBLEM SELECTOR PLAN 1
-S/p intubation in MICU in the setting of grand mal seizures, pneumonia, Flu  -Completed course of ABX, tamiflu for Flu. Extubated to AVAPS 1/19  -S/p RRT 1/23 AM for fevers, hypoxia requiring AVAPS. Likely aspiration event +/- flash pulmonary edema  -S/p 2nd RRT 1/23 PM for increased WOB on AVAPS, intubated and tx to MICU   -Extubated to AVAPS 1/28, now using qHS   -Continue AVAPS  ePAP 5 Rate 14 Max pressure 35 Min Pressure 15 fio2 40% qHS and PRN for increased WOB for now. Pt has CPAP at home for VAZQUEZ, will need to monitor off AVAPs prior to discharge.  -Keep sats >90% with O2 PRN. Wean o2 as tolerated.   -CXR 1/29 with low lung volumes  -Repeat CT chest with bronchial secretions, RML/RLL atelectasis +/- underlying infiltrate, scattered b/l upper lobe GGO. ABX completed, ID f/u.   -Continue bronchodilators. Start Mucomyst 20% 3ml q6h x3 days (give with Duoneb)  -Chest PT   -ABG with no Co2 retention.    2/2: seems OK:  has rll atelectasis:  hopefully will improve on its own so that bronc does not have to be dne:  cont conservative rx for now:  2/3: repeat cxr:  dw sister about possibility of bronch : she is  not interested in it at this time:  2/4: cxr doi ne yesterday  /: to me looks pretty good:  I can see the border of left and right  HD:  may be atelectasis improved: In ay case the relative at bedside does not want to do any bronch on hi, at this time  2/5: his ABG is pretty good:  was off  niv last night:  will do again am : keep off bipap:  if abg is good can go without bipap

## 2025-02-05 NOTE — PROGRESS NOTE ADULT - ASSESSMENT
69M w/ hx of metastatic testicular cancer (s/p L orchiectomy, on etoposide/cisplatin chemo), epilepsy (grand-mal seizures), schizophrenia, developmental delay, HTN, HLD, VAZQUEZ, stage III CKD, severe obesity, secondary hyperparathyroidism, anemia, thrombocytopenia who presented to ED for fall, possible seizure prior.    Plan:    # Hypoxia: Sepsis/sirs, AHRF possible flash pulmonary edema iso uncontrolled HTN, c/f aspiration pneumonia : Improving  - S/p RRT 1/23 am and 1/23 pm, for inc WOB on AVAPS, s/p intubation and transfer to MICU, required pressor support-> downgraded 1/29  - Sepsis protocol initiated  - CXR w/ mild pulm congestion.  - Diuresis as tolerated  - Thyroid US w/ nodule  - NGT in place, c/w TF's as tolerated-> FEEST -> rec pureed /mod thick liquids   - 1/25 CXR with R infiltrate, Bcx remain NGTD, WBC down  - 1/25 Scx with rare Pseudomonas aeruginosa -pansensitive   - MRI brain with metastatic disease  - 1/28 s/p extubation   - Discontinued zosyn --completed 7d course 1/29  - Monitor off antibiotics per ID rec's  - ID and Pulm following    # Syncopal seizure: Improved, downgraded  - Patient with hx of epilepsy, generalized grand mal seizures  - C/w Lamictal and Keppra  - CTH/C spine/MF negative for acute ICH, notable for mild R hematoma  - 1/17 sp multiple RRTs  for grand mal seizure activity and was initially recommended sepsis workup and antipyretics given T103F, but patient had x2 more episode and was ultimately given ativan 2mg, vimpat load 200mg, and intubated for airway protection with MICU transfer.   - Extubated 1/20  - Keppra 1.5g BID, Lamotrigine 100mg BID, Gabapentin 300mg BID, Vimpat 150mg BID  - Ativan for seizures PRM  - Per Neuro -> MRI brain w and wo contrast to look for potential seizure foci that could cause increased frequency especially given hx of metastatic testicular cancer; deferred MRI given copious secretions   - s/p vEEG, negative for seizures  - Care per MICU-> downgraded 1/21, back to ICU 1/23 for Hypoxia, Increased WOB, Downgraded 1/29    # Brain lesion:  - MRI brain now with 5.4 mm enhancing lesion in the LEFT middle cerebellar peduncle with associated edema suspicious for metastases  - MRI Lumbar negative for acute disease  - Neurology recs noted, new lesion not likely to cause seizure  - Seen by Heme Onc-> will need another PET-CT, can be completed outpatient once stable if concern can proceed with biopsy at that time   - Heme Onc following    # Flu:  - sp Tamiflu completed 5d on 1/22   - Monitor temps/WBC  - ID following    # CKD3:   - Monitor I/O's  - Serial Cr  - Avoid nephrotoxins  - Renally dose medications  - Renal following->  Renal fnx improving     # HTN/ HLD:  - C/w CV meds    # Pneumonia:  - Completed ceftriaxone 5d course on 1/21  - Retreated for aspiration pneumonia in ICU on 1/23  - ID following    # Schizophrenia/Depression:  - C/w current meds    # VAZQUEZ:  - CPAP at night    # Hx of metastatic testicular cancer, s/p L orchiectomy, on etoposide/cisplatin chemo, last dose on 6/21/24:  - Outpt Oncology follow-up    # DVT ppx:  - Heparin sq    Dispo: [facility]    [facility]  [phone]

## 2025-02-05 NOTE — PROGRESS NOTE ADULT - SUBJECTIVE AND OBJECTIVE BOX
[facility]   [phone]  RENAL FOLLOW UP NOTE  --------------------------------------------------------------------------------  Chief Complaint:    24 hour events/subjective:    seen earlier   states breathing is better today     PAST HISTORY  --------------------------------------------------------------------------------  No significant changes to PMH, PSH, FHx, SHx, unless otherwise noted    ALLERGIES & MEDICATIONS  --------------------------------------------------------------------------------  Allergies    penicillin (Hives)  seasonal allergies (Unknown)    Intolerances    latex (Rash)    Standing Inpatient Medications  albuterol/ipratropium for Nebulization 3 milliLiter(s) Nebulizer every 6 hours  atorvastatin 20 milliGRAM(s) Oral at bedtime  bacitracin   Ointment 1 Application(s) Topical two times a day  chlorhexidine 2% Cloths 1 Application(s) Topical <User Schedule>  chlorhexidine 2% Cloths 1 Application(s) Topical <User Schedule>  escitalopram 15 milliGRAM(s) Oral daily  gabapentin Solution 150 milliGRAM(s) Oral two times a day  heparin   Injectable 7500 Unit(s) SubCutaneous every 8 hours  influenza  Vaccine (HIGH DOSE) 0.5 milliLiter(s) IntraMuscular once  lacosamide IVPB 100 milliGRAM(s) IV Intermittent every 12 hours  lamoTRIgine 100 milliGRAM(s) Oral two times a day  levETIRAcetam  IVPB 750 milliGRAM(s) IV Intermittent two times a day  lidocaine   4% Patch 1 Patch Transdermal daily  lurasidone 40 milliGRAM(s) Oral daily  pantoprazole  Injectable 40 milliGRAM(s) IV Push daily  polyethylene glycol 3350 17 Gram(s) Oral daily  senna 2 Tablet(s) Oral at bedtime  sevelamer carbonate 800 milliGRAM(s) Oral three times a day  valACYclovir 1000 milliGRAM(s) Oral every 12 hours    PRN Inpatient Medications  acetaminophen     Tablet .. 650 milliGRAM(s) Oral every 6 hours PRN  nystatin Powder 1 Application(s) Topical three times a day PRN      REVIEW OF SYSTEMS  --------------------------------------------------------------------------------    Gen: denies  fevers/chills, or HA or dizziness   CVS: denies chest pain/palpitations  Resp: denies SOB/Cough  GI: Denies N/V/Abd pain  : Denies dysuria/oliguria/hematuria      VITALS/PHYSICAL EXAM  --------------------------------------------------------------------------------  T(C): 36.7 (02-05-25 @ 16:34), Max: 36.8 (02-05-25 @ 12:10)  HR: 87 (02-05-25 @ 16:34) (75 - 98)  BP: 144/88 (02-05-25 @ 16:34) (133/86 - 152/88)  RR: 18 (02-05-25 @ 16:34) (18 - 18)  SpO2: 95% (02-05-25 @ 12:10) (95% - 96%)  Wt(kg): --        02-04-25 @ 07:01  -  02-05-25 @ 07:00  --------------------------------------------------------  IN: 0 mL / OUT: 1200 mL / NET: -1200 mL      Physical Exam:  	              Gen: comfortable appearing  on O2   	Pulm: decrease bs  no rales  +coarse, no wheezing  	CV: tachy  S1S2; no rub  	Abd: hypoactive bs soft distended  	UE: Warm, no cyanosis  no clubbing,  no edema;   	LE: Warm, no cyanosis  no clubbing, no edema  	Neuro:  alert and oriented      LABS/STUDIES  --------------------------------------------------------------------------------              8.5    7.09  >-----------<  192      [02-04-25 @ 06:35]              27.4     142  |  110  |  61  ----------------------------<  91      [02-04-25 @ 06:34]  5.1   |  19  |  2.30        Ca     9.8     [02-04-25 @ 06:34]      Phos  3.7     [02-05-25 @ 06:43]            Creatinine Trend:  SCr 2.30 [02-04 @ 06:34]  SCr 2.12 [02-03 @ 07:19]  SCr 2.52 [02-02 @ 12:37]  SCr 2.96 [02-01 @ 06:51]  SCr 3.46 [01-31 @ 06:59]

## 2025-02-05 NOTE — PROGRESS NOTE ADULT - ASSESSMENT
68 y/o M with PMH of metastatic testicular cancer (s/p L orchiectomy, on etoposide/cisplatin chemo, last dose 6/2024), epilepsy (grand-mal seizures), schizophrenia, developmental delay, HTN, HLD, VAZQUEZ, stage III CKD, severe obesity, secondary hyperparathyroidism, anemia, thrombocytopenia who presented to ED for fall, possible seizure prior. On 1/17 patient had multiple RRTs called for grand mal seizure activity and  patient had x2 more episode and was ultimately given ativan 2mg, Vimpat load 200mg, and intubated for airway protection with MICU transfer. Extubated 1/19 - to AVAPS, post extubation with increased secretion. Downgraded to medical floor 1/21. Pt still with fevers, s/p RRT 1/23 AM for fever, hypoxia.  AVAPS - O2 sats 98% but tachypneic to 40, tachycardic, pt endorsing SOB. RRT ---> intubated for respiratory failure. also febrile and Hypertensive. CKD      1- CKD III  with SHAGUFTA   2- CHF   3- fevers  4- tachycardia  5- respiratory failure s/p extubation   6- HTN   7- hypernatremia     SHAGUFTA suspect ischemic ATN in setting of infection and hypotension   creatinine is improving to baseline  off diuretics now   non oliguric   na in range  borderline k, low k diet  bronchodilators   still hypoxemic and cont O2 pulm following   hyperphosphatemia renvela 1 tab tid, trend phos in am  strict I/O  PT  check lytes and cr in am

## 2025-02-05 NOTE — PROGRESS NOTE ADULT - ASSESSMENT
Patient is a 69 year old male with PMH of metastatic testicular cancer (s/p L orchiectomy, on etoposide/cisplatin chemo, last dose 6/2024), epilepsy (grand-mal seizures), schizophrenia, developmental delay, HTN, HLD, VAZQUEZ, stage III CKD, severe obesity, secondary hyperparathyroidism, anemia, thrombocytopenia who presented to ED for fall, possible seizure prior. Patient reported cough for few weeks with no other respiratory symptoms.   Admitted for further work up for syncope, c/f seizure   Influenza A  1/17 s/p RRTs for grand mal seizure activity, s/p intubation and transfer to MICU  - 1/17 CT chest with anterior bowing of posterior tracheal wall suggestive of tracheomalacia, trace R pleural effusion   - MRSA PCR screen negative    - 1/18 sputum culture negative    - s/p extubation 1/19   - 1/20 Bcx negative    - s/p ceftriaxone 1/17-1/21   - s/p tamiflu 1/17-1/21    Sepsis/sirs, AHRF possible flash pulmonary edema iso uncontrolled HTN, c/f aspiration pneumonia   - 1/23 s/p RRT am for hypoxia/inc WOB, febrile last night 100.6F and now 101F this am, tachycardia, leukocytosis 11k   - repeat COVID/Flu/RSV with known influenza A-likely shedding   - 1/23 afternoon s/p RRT for inc WOB on AVAPS, s/p intubation and transfer to MICU, required pressor support  - MRSA PCR screen negative   - 1/24 afebrile overnight, WBC uptrending, weaned off pressor earlier in am, on zosyn   - 1/25 CXR with R infiltrate, Bcx remain NGTD, WBC down  - 1/25 Scx with rare Pseudomonas aeruginosa -pansensitive   - MRI brain with metastatic disease, thyroid us with nodule  - 1/26 CT spine with no acute abn of spine, stable postop changes and hardware; noted with multifocal lung abn with bibasilar atelectasis or consolidation and patchy airspace opacities in the RUL  - 1/28 s/p extubation   - 1/30 CT chest with bronchial secretions with complete RML/RLL consolidation/collapse, scattered b/l upper lobe ggo, no RUL mass noted, possible resolving pneumonia   - reportedly h/o penicillin allergy--tolerating zosyn, removed from allergy list   - remains afebrile, WBC wnl, on NC, nontoxic appearing with no new complaints, completed antibiotics 1/30 am     s/p cefepime 1/23  s/p zosyn 1/23-1/30    Recommendations:   Continue valtrex 1000mg PO Q12h 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  [facility]  Available on [employer] TEAMS - *PREFERRED*  [phone]  After 5pm on weekdays and all day on weekends - please call [phone]     Thank you for consulting us and involving us in the management of this patients case. In addition to reviewing history, imaging, documents, labs, microbiology, took into account antibiotic stewardship, local antibiogram and infection control strategies and potential transmission issues.

## 2025-02-05 NOTE — PROGRESS NOTE ADULT - ASSESSMENT
Mr. Gr is a 69 year old male with PMHx of seizure disorder, sleep apnea, HTN, chronic idiopathic constipation, stage III CKD, severe obesity, secondary hyperparathyroidism, anemia, thrombocytopenia, hyperlipidemia, testicular cancer s/p EP x 4 under the care of Dr. Ovalles who is admitted s/p fall in setting of possible seizure since 01/16/2025. He was intubated early in his admission due to seziures and extubated 1/21/2025 and then again intubated in setting of respiratory distress, requiring diuresis, with SHAGUFTA on CKD, in the MICU. Imaging shows possible brain metastatic disease and lung mass as well as abnormal thyroid nodule.     Former smoker, quit 14y prior, denied ETOH, drug use. Lives at home. Does not have children. Denied family history of blood disorders or malignancy.  Denied previous workplace related exposures.  Denied previous history of blood transfusions.  Denied history of thromboses.  Denied history of  requiring anticoagulation.        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  Margins were negative.  pT3Nx. CT C/A/P with few left para-aortic and left iliac chain lymph nodes largest measuring 8.1 cm left para-aortic node, bilateral subcentimeter noncalcified pulmonary nodules measuring up to 7 mm. AFP, LDH, hCG were all elevated. 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 Completed four cycles of adjuvant therapy with Cisplatin+Etoposide, with cisplatin dose reduced 50% and Etoposide 50% due to thrombocytopenia at that time. Subsequent scans 08/2024 showed resolution of disease and negative markers,         01/26/2025: continues intubated and sedated in the ICU, discussed with ICU team yesterday, pending markers, endocrinology eval noted     01/27/2025:  AFP/BHCG normal,     01/28/2025: continues in MICU intubated, off sedation, Neurology recs noted, L cerebellar lesion not likely be cause of seizure, GOC noted per palliative discussion, pts primary Oncologist aware of current events as well     01/29/2025: , awake and alert, extubated 01/28/2025, continues in MICU     01/30/2025: now on floor, bipap, no overnight events noted    01/31/2025:  repeat CT chest w/o contrast noted with new secretions at the level of the bronchus intermedius with complete right middle/lower bilobar consolidation/collapse and new scattered bilateral upper lobe groundglass opacities, concerning for infection. Discussed with pts wife and discussed with primary Oncologist Dr. Ovalles, agree with current plan    02/01/2025:  no overnight events noted, recapped hospitalization, aware of outpatient plan once he is stable, no signs of bleeding     02/02/2025:  renal function improving, HSV1 of lip positive         # Brain lesion  # Seizures  - Seizures noted, pt with hx of seizures  - MRI brain now with 5.4 mm enhancing lesion in the LEFT middle cerebellar peduncle with associated edema suspicious for metastases  - MRI Lumbar negative for acute disease  - Small lesion without mass effect or edema, recommended to correlate per neurology if foci and locus are concordant with seizure activity  - Neurology recs noted, new lesion not likely to cause seizure  - It is rare, but nonetheless not impossible, for testicular cancer to be metastatic to the brain  - He will need another PET-CT, can be completed outpatient once stable if concern can proceed with biopsy at that time   - Endocrinology eval for thyroid nodule  - AFP/BHCG normal,      # Thyroid nodule  - US Thyroid 01/24/2025 with 1.6cm complex mixed solid cystic hypoechoic nodule in the lower pole of the right thyroid lobe, TIRADS 5.  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 Ovalles 12/03/2024, markers continued to be negative  - See above in regards to brain lesion  - MRI Neck shows 4.2 cm RIGHT upper lobe mass/infiltrate  - Recommend IR guided biopsy of RUL mass if PET-CT concordant/suggestive of malignancy, PET-CT as outpt and then consideration after better characterization   - Repeat CT chest w/o contrast noted with new secretions at the level of the bronchus intermedius with complete right middle/lower bilobar consolidation/collapse and new scattered bilateral upper lobe groundglass opacities, concerning for infection  - Discussed with pts wife and discussed with primary Oncologist Dr. 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 Gr, please do not hesitate to call or text me if you have further questions or concerns.        Brayan Mart MD  [facility]   Division of Hematology/Oncology  [facility] [place] [facility], [address]  [place] [address]  P: [phone]  F: [phone]    Attestation:    ----Pt evalulated including face-to-face interaction in addition to chart review, reviewing treatment plan, and managing the patient’s chronic diagnoses as listed in the assessment----

## 2025-02-05 NOTE — PROGRESS NOTE ADULT - SUBJECTIVE AND OBJECTIVE BOX
[facility]  JACINTO Horton Y. Patel, S. Shah, G. Casimir  [phone]  ([phone] - weekdays after 5pm and weekends)    Name: OSCAR MILAN  Age/Gender: 69y Male  MRN: [id number]    Interval History:  Patient seen and examined this morning.   No new complaints noted.  Notes reviewed  No concerning overnight events  Afebrile   Allergies: penicillin (Hives)  seasonal allergies (Unknown)      Objective:  Vitals:   T(F): 97.5 (02-05-25 @ 04:40), Max: 98.2 (02-04-25 @ 16:34)  HR: 75 (02-05-25 @ 04:40) (75 - 97)  BP: 147/81 (02-05-25 @ 04:40) (117/78 - 152/88)  RR: 18 (02-05-25 @ 04:40) (18 - 18)  SpO2: 95% (02-05-25 @ 04:40) (95% - 98%)  Physical Examination:  General: no acute distress, NC  HEENT: NC/AT, lip lesions scabs fell off   Respiratory: no acc muscle use, breathing comfortably  Cardiovascular: S1 and S2 present  Gastrointestinal: obese, nondistended  Extremities: no edema, no cyanosis  Skin: no visible rash    Laboratory Studies:  CBC:                       8.5    7.09  )-----------( 192      ( 04 Feb 2025 06:35 )             27.4     WBC Trend:  7.09 02-04-25 @ 06:35  7.04 02-03-25 @ 07:21  7.50 02-01-25 @ 06:51  6.09 01-31-25 @ 06:59  7.16 01-30-25 @ 07:15    CMP: 02-04    142  |  110[H]  |  61[H]  ----------------------------<  91  5.1   |  19[L]  |  2.30[H]    Ca    9.8      04 Feb 2025 06:34  Phos  3.7     02-05      Creatinine: 2.30 mg/dL (02-04-25 @ 06:34)  Creatinine: 2.12 mg/dL (02-03-25 @ 07:19)  Creatinine: 2.52 mg/dL (02-02-25 @ 12:37)  Creatinine: 2.96 mg/dL (02-01-25 @ 06:51)  Creatinine: 3.46 mg/dL (01-31-25 @ 06:59)  Creatinine: 4.25 mg/dL (01-30-25 @ 07:12)  Creatinine: 4.71 mg/dL (01-29-25 @ 11:56)    Microbiology: reviewed   Culture - Sputum (collected 01-25-25 @ 07:21)  Source: .Sputum Sputum  Gram Stain (01-26-25 @ 23:12):    Numerous polymorphonuclear leukocytes per low power field    Rare Squamous epithelial cells per low power field    No organisms seen  Final Report (01-27-25 @ 17:11):    Rare Pseudomonas aeruginosa    Commensal lucia consistent with body site  Organism: Pseudomonas aeruginosa (01-27-25 @ 17:11)  Organism: Pseudomonas aeruginosa (01-27-25 @ 17:11)      -  Levofloxacin: S <=0.5      -  Aztreonam: S <=4      -  Cefepime: S <=2      -  Piperacillin/Tazobactam: S <=8      -  Ciprofloxacin: S <=0.25      -  Imipenem: S 2      Method Type: MARIELENA      -  Meropenem: S <=1      -  Ceftazidime: S 4    Culture - Urine (collected 01-24-25 @ 17:16)  Source: Catheterized Catheterized  Final Report (01-26-25 @ 02:35):    No growth    Culture - Blood (collected 01-23-25 @ 15:14)  Source: .Blood BLOOD  Final Report (01-28-25 @ 20:00):    No growth at 5 days    Culture - Blood (collected 01-23-25 @ 15:14)  Source: .Blood BLOOD  Final Report (01-28-25 @ 20:00):    No growth at 5 days    Culture - Blood (collected 01-23-25 @ 12:21)  Source: .Blood BLOOD  Final Report (01-28-25 @ 17:00):    No growth at 5 days    Culture - Blood (collected 01-23-25 @ 12:21)  Source: .Blood BLOOD  Final Report (01-28-25 @ 17:00):    No growth at 5 days    Culture - Blood (collected 01-23-25 @ 00:15)  Source: .Blood BLOOD  Final Report (01-28-25 @ 04:01):    No growth at 5 days    Culture - Blood (collected 01-23-25 @ 00:10)  Source: .Blood BLOOD  Final Report (01-28-25 @ 04:01):    No growth at 5 days    02-04-25 @ 23:41 SARS-CoV-2 NotDetec/Influenza A NotDetec/Influenza B NotDetec/RSV NotDetec    Radiology: reviewed     Medications:  acetaminophen     Tablet .. 650 milliGRAM(s) Oral every 6 hours PRN  albuterol/ipratropium for Nebulization 3 milliLiter(s) Nebulizer every 6 hours  atorvastatin 20 milliGRAM(s) Oral at bedtime  bacitracin   Ointment 1 Application(s) Topical two times a day  chlorhexidine 2% Cloths 1 Application(s) Topical <User Schedule>  chlorhexidine 2% Cloths 1 Application(s) Topical <User Schedule>  escitalopram 15 milliGRAM(s) Oral daily  gabapentin Solution 150 milliGRAM(s) Oral two times a day  heparin   Injectable 7500 Unit(s) SubCutaneous every 8 hours  influenza  Vaccine (HIGH DOSE) 0.5 milliLiter(s) IntraMuscular once  lacosamide IVPB 100 milliGRAM(s) IV Intermittent every 12 hours  lamoTRIgine 100 milliGRAM(s) Oral two times a day  levETIRAcetam  IVPB 750 milliGRAM(s) IV Intermittent two times a day  lidocaine   4% Patch 1 Patch Transdermal daily  lurasidone 40 milliGRAM(s) Oral daily  nystatin Powder 1 Application(s) Topical three times a day PRN  pantoprazole  Injectable 40 milliGRAM(s) IV Push daily  polyethylene glycol 3350 17 Gram(s) Oral daily  senna 2 Tablet(s) Oral at bedtime  sevelamer carbonate 800 milliGRAM(s) Oral three times a day  valACYclovir 1000 milliGRAM(s) Oral every 12 hours    Current Antimicrobials:  valACYclovir 1000 milliGRAM(s) Oral every 12 hours    Prior/Completed Antimicrobials:  cefTRIAXone   IVPB  cefTRIAXone   IVPB  oseltamivir  piperacillin/tazobactam IVPB..  vancomycin  IVPB

## 2025-02-05 NOTE — PROGRESS NOTE ADULT - SUBJECTIVE AND OBJECTIVE BOX
Date of Service: 02-05-25 @ 16:32    Patient is a 69y old  Male who presents with a chief complaint of seizure, flu, elevated trop (05 Feb 2025 07:39)      Any change in ROS: seems to be doing  ok ; no sob:     MEDICATIONS  (STANDING):  albuterol/ipratropium for Nebulization 3 milliLiter(s) Nebulizer every 6 hours  atorvastatin 20 milliGRAM(s) Oral at bedtime  bacitracin   Ointment 1 Application(s) Topical two times a day  chlorhexidine 2% Cloths 1 Application(s) Topical <User Schedule>  chlorhexidine 2% Cloths 1 Application(s) Topical <User Schedule>  escitalopram 15 milliGRAM(s) Oral daily  gabapentin Solution 150 milliGRAM(s) Oral two times a day  heparin   Injectable 7500 Unit(s) SubCutaneous every 8 hours  influenza  Vaccine (HIGH DOSE) 0.5 milliLiter(s) IntraMuscular once  lacosamide IVPB 100 milliGRAM(s) IV Intermittent every 12 hours  lamoTRIgine 100 milliGRAM(s) Oral two times a day  levETIRAcetam  IVPB 750 milliGRAM(s) IV Intermittent two times a day  lidocaine   4% Patch 1 Patch Transdermal daily  lurasidone 40 milliGRAM(s) Oral daily  pantoprazole  Injectable 40 milliGRAM(s) IV Push daily  polyethylene glycol 3350 17 Gram(s) Oral daily  senna 2 Tablet(s) Oral at bedtime  sevelamer carbonate 800 milliGRAM(s) Oral three times a day  valACYclovir 1000 milliGRAM(s) Oral every 12 hours    MEDICATIONS  (PRN):  acetaminophen     Tablet .. 650 milliGRAM(s) Oral every 6 hours PRN Mild Pain (1 - 3)  nystatin Powder 1 Application(s) Topical three times a day PRN fungal infection you may see and want to treat    Vital Signs Last 24 Hrs  T(C): 36.8 (05 Feb 2025 12:10), Max: 36.8 (04 Feb 2025 16:34)  T(F): 98.3 (05 Feb 2025 12:10), Max: 98.3 (05 Feb 2025 12:10)  HR: 98 (05 Feb 2025 12:10) (75 - 98)  BP: 133/86 (05 Feb 2025 12:10) (117/78 - 152/88)  BP(mean): --  RR: 18 (05 Feb 2025 12:10) (18 - 18)  SpO2: 95% (05 Feb 2025 12:10) (95% - 97%)    Parameters below as of 05 Feb 2025 12:27  Patient On (Oxygen Delivery Method): nasal cannula        I&O's Summary    04 Feb 2025 07:01  -  05 Feb 2025 07:00  --------------------------------------------------------  IN: 0 mL / OUT: 1200 mL / NET: -1200 mL          Physical Exam:   GENERAL: NAD, well-groomed, well-developed  HEENT: BREE/   Atraumatic, Normocephalic  ENMT: No tonsillar erythema, exudates, or enlargement; Moist mucous membranes, Good dentition, No lesions  NECK: Supple, No JVD, Normal thyroid  CHEST/LUNG: Clear to auscultaion  CVS: Regular rate and rhythm; No murmurs, rubs, or gallops  GI: : Soft, Nontender, Nondistended; Bowel sounds present  NERVOUS SYSTEM:  Alert & Oriented X3  EXTREMITIES: - edema  LYMPH: No lymphadenopathy noted  SKIN: No rashes or lesions  ENDOCRINOLOGY: No Thyromegaly  PSYCH: Appropriate    Labs:  ABG - ( 05 Feb 2025 06:17 )  pH, Arterial: 7.35  pH, Blood: x     /  pCO2: 38    /  pO2: 166   / HCO3: 21    / Base Excess: -4.2  /  SaO2: 99.1            22                            8.5    7.09  )-----------( 192      ( 04 Feb 2025 06:35 )             27.4                         9.6    7.04  )-----------( 221      ( 03 Feb 2025 07:21 )             30.7     02-04    142  |  110[H]  |  61[H]  ----------------------------<  91  5.1   |  19[L]  |  2.30[H]  02-03    148[H]  |  114[H]  |  69[H]  ----------------------------<  93  4.7   |  20[L]  |  2.12[H]  02-02    145  |  114[H]  |  83[H]  ----------------------------<  156[H]  4.5   |  18[L]  |  2.52[H]    Ca    9.8      04 Feb 2025 06:34  Phos  3.7     02-05    TPro  6.6  /  Alb  3.5  /  TBili  0.4  /  DBili  x   /  AST  52[H]  /  ALT  40  /  AlkPhos  128[H]  02-03  TPro  6.7  /  Alb  3.3  /  TBili  0.3  /  DBili  x   /  AST  55[H]  /  ALT  38  /  AlkPhos  133[H]  02-02    CAPILLARY BLOOD GLUCOSE    rad< from: Xray Chest 1 View- PORTABLE-Routine (Xray Chest 1 View- PORTABLE-Routine .) (02.03.25 @ 17:06) >        INTERPRETATION:  TECHNIQUE: A single AP view of the chest was obtained.   Ordered time:   2/3/2025 5:06 PM    COMPARISON: 1/29/2025    CLINICAL INFORMATION: Follow-up right    FINDINGS:  A right-sided Mediport is seen with its tip overlying the right atrium.  The heart is unchanged in size.  There is improved aeration of the right lung base with patchy bibasilar   opacities remaining.  There are no pleural effusions.  There is no pneumothorax.    IMPRESSION:    Improved aeration at the right lung base with bibasilar patchy opacities   likely atelectasis.    --- End of Report ---            MARIA G ARZATE MD; Attending Radiologist  This document has been electronically signed. Feb 4 2025  9:33PM    < end of copied text >            Urinalysis Basic - ( 04 Feb 2025 06:34 )    Color: x / Appearance: x / SG: x / pH: x  Gluc: 91 mg/dL / Ketone: x  / Bili: x / Urobili: x   Blood: x / Protein: x / Nitrite: x   Leuk Esterase: x / RBC: x / WBC x   Sq Epi: x / Non Sq Epi: x / Bacteria: x            RECENT CULTURES:        RESPIRATORY CULTURES:          Studies  Chest X-RAY  CT SCAN Chest   Venous Dopplers: LE:   CT Abdomen  Others

## 2025-02-05 NOTE — PROGRESS NOTE ADULT - SUBJECTIVE AND OBJECTIVE BOX
[facility] HEMAT[facility]Y/ON[facility]OGY INPATIENT PROGRESS NOTE     Interval Hx:   02-05-25: Mr. Gr was seen at bedside today.    Meds:   MEDICATIONS  (STANDING):  albuterol/ipratropium for Nebulization 3 milliLiter(s) Nebulizer every 6 hours  atorvastatin 20 milliGRAM(s) Oral at bedtime  bacitracin   Ointment 1 Application(s) Topical two times a day  chlorhexidine 2% Cloths 1 Application(s) Topical <User Schedule>  chlorhexidine 2% Cloths 1 Application(s) Topical <User Schedule>  escitalopram 15 milliGRAM(s) Oral daily  gabapentin Solution 150 milliGRAM(s) Oral two times a day  heparin   Injectable 7500 Unit(s) SubCutaneous every 8 hours  influenza  Vaccine (HIGH DOSE) 0.5 milliLiter(s) IntraMuscular once  lacosamide IVPB 100 milliGRAM(s) IV Intermittent every 12 hours  lamoTRIgine 100 milliGRAM(s) Oral two times a day  levETIRAcetam  IVPB 750 milliGRAM(s) IV Intermittent two times a day  lidocaine   4% Patch 1 Patch Transdermal daily  lurasidone 40 milliGRAM(s) Oral daily  pantoprazole  Injectable 40 milliGRAM(s) IV Push daily  polyethylene glycol 3350 17 Gram(s) Oral daily  senna 2 Tablet(s) Oral at bedtime  sevelamer carbonate 800 milliGRAM(s) Oral three times a day  valACYclovir 1000 milliGRAM(s) Oral every 12 hours    MEDICATIONS  (PRN):  acetaminophen     Tablet .. 650 milliGRAM(s) Oral every 6 hours PRN Mild Pain (1 - 3)  nystatin Powder 1 Application(s) Topical three times a day PRN fungal infection you may see and want to treat    Vital Signs Last 24 Hrs  T(C): 36.4 (05 Feb 2025 04:40), Max: 36.8 (04 Feb 2025 16:34)  T(F): 97.5 (05 Feb 2025 04:40), Max: 98.2 (04 Feb 2025 16:34)  HR: 75 (05 Feb 2025 04:40) (75 - 97)  BP: 147/81 (05 Feb 2025 04:40) (117/78 - 153/81)  BP(mean): --  RR: 18 (05 Feb 2025 04:40) (18 - 18)  SpO2: 95% (05 Feb 2025 04:40) (95% - 98%)    Parameters below as of 05 Feb 2025 04:40  Patient On (Oxygen Delivery Method): nasal cannula  O2 Flow (L/min): 3    Physical Exam:  Gen: NAD  HEENT: EOMI, MMM  Chest: equal chest rise  Cardiac: regular   Abd: non distended   Neuro: AAOx2    Labs:                        8.5    7.09  )-----------( 192      ( 04 Feb 2025 06:35 )             27.4     CBC Full  -  ( 04 Feb 2025 06:35 )  WBC Count : 7.09 K/uL  RBC Count : 2.69 M/uL  Hemoglobin : 8.5 g/dL  Hematocrit : 27.4 %  Platelet Count - Automated : 192 K/uL  Mean Cell Volume : 101.9 fl  Mean Cell Hemoglobin : 31.6 pg  Mean Cell Hemoglobin Concentration : 31.0 g/dL    02-04    142  |  110[H]  |  61[H]  ----------------------------<  91  5.1   |  19[L]  |  2.30[H]    Ca    9.8      04 Feb 2025 06:34    TPro  6.6  /  Alb  3.5  /  TBili  0.4  /  DBili  x   /  AST  52[H]  /  ALT  40  /  AlkPhos  128[H]  02-03       [facility]AT[facility]/ON[facility]Y INPATIENT PROGRESS NOTE     Interval Hx:   02-05-25: Mr. Gr was seen at bedside today, no overnight events noted     Meds:   MEDICATIONS  (STANDING):  albuterol/ipratropium for Nebulization 3 milliLiter(s) Nebulizer every 6 hours  atorvastatin 20 milliGRAM(s) Oral at bedtime  bacitracin   Ointment 1 Application(s) Topical two times a day  chlorhexidine 2% Cloths 1 Application(s) Topical <User Schedule>  chlorhexidine 2% Cloths 1 Application(s) Topical <User Schedule>  escitalopram 15 milliGRAM(s) Oral daily  gabapentin Solution 150 milliGRAM(s) Oral two times a day  heparin   Injectable 7500 Unit(s) SubCutaneous every 8 hours  influenza  Vaccine (HIGH DOSE) 0.5 milliLiter(s) IntraMuscular once  lacosamide IVPB 100 milliGRAM(s) IV Intermittent every 12 hours  lamoTRIgine 100 milliGRAM(s) Oral two times a day  levETIRAcetam  IVPB 750 milliGRAM(s) IV Intermittent two times a day  lidocaine   4% Patch 1 Patch Transdermal daily  lurasidone 40 milliGRAM(s) Oral daily  pantoprazole  Injectable 40 milliGRAM(s) IV Push daily  polyethylene glycol 3350 17 Gram(s) Oral daily  senna 2 Tablet(s) Oral at bedtime  sevelamer carbonate 800 milliGRAM(s) Oral three times a day  valACYclovir 1000 milliGRAM(s) Oral every 12 hours    MEDICATIONS  (PRN):  acetaminophen     Tablet .. 650 milliGRAM(s) Oral every 6 hours PRN Mild Pain (1 - 3)  nystatin Powder 1 Application(s) Topical three times a day PRN fungal infection you may see and want to treat    Vital Signs Last 24 Hrs  T(C): 36.4 (05 Feb 2025 04:40), Max: 36.8 (04 Feb 2025 16:34)  T(F): 97.5 (05 Feb 2025 04:40), Max: 98.2 (04 Feb 2025 16:34)  HR: 75 (05 Feb 2025 04:40) (75 - 97)  BP: 147/81 (05 Feb 2025 04:40) (117/78 - 153/81)  BP(mean): --  RR: 18 (05 Feb 2025 04:40) (18 - 18)  SpO2: 95% (05 Feb 2025 04:40) (95% - 98%)    Parameters below as of 05 Feb 2025 04:40  Patient On (Oxygen Delivery Method): nasal cannula  O2 Flow (L/min): 3    Physical Exam:  Gen: NAD  HEENT: EOMI, MMM  Chest: equal chest rise  Cardiac: regular   Abd: non distended   Neuro: AAOx2    Labs:                        8.5    7.09  )-----------( 192      ( 04 Feb 2025 06:35 )             27.4     CBC Full  -  ( 04 Feb 2025 06:35 )  WBC Count : 7.09 K/uL  RBC Count : 2.69 M/uL  Hemoglobin : 8.5 g/dL  Hematocrit : 27.4 %  Platelet Count - Automated : 192 K/uL  Mean Cell Volume : 101.9 fl  Mean Cell Hemoglobin : 31.6 pg  Mean Cell Hemoglobin Concentration : 31.0 g/dL    02-04    142  |  110[H]  |  61[H]  ----------------------------<  91  5.1   |  19[L]  |  2.30[H]    Ca    9.8      04 Feb 2025 06:34    TPro  6.6  /  Alb  3.5  /  TBili  0.4  /  DBili  x   /  AST  52[H]  /  ALT  40  /  AlkPhos  128[H]  02-03

## 2025-02-06 ENCOUNTER — TRANSCRIPTION ENCOUNTER (OUTPATIENT)
Age: 70
End: 2025-02-06

## 2025-02-06 VITALS
DIASTOLIC BLOOD PRESSURE: 79 MMHG | OXYGEN SATURATION: 99 % | SYSTOLIC BLOOD PRESSURE: 134 MMHG | TEMPERATURE: 98 F | HEART RATE: 86 BPM | RESPIRATION RATE: 18 BRPM

## 2025-02-06 LAB
ALBUMIN SERPL ELPH-MCNC: 3.6 G/DL — SIGNIFICANT CHANGE UP (ref 3.3–5)
ALP SERPL-CCNC: 166 U/L — HIGH (ref 40–120)
ALT FLD-CCNC: 51 U/L — HIGH (ref 10–45)
ANION GAP SERPL CALC-SCNC: 11 MMOL/L — SIGNIFICANT CHANGE UP (ref 5–17)
AST SERPL-CCNC: 58 U/L — HIGH (ref 10–40)
BILIRUB SERPL-MCNC: 0.4 MG/DL — SIGNIFICANT CHANGE UP (ref 0.2–1.2)
BUN SERPL-MCNC: 47 MG/DL — HIGH (ref 7–23)
CALCIUM SERPL-MCNC: 9.9 MG/DL — SIGNIFICANT CHANGE UP (ref 8.4–10.5)
CHLORIDE SERPL-SCNC: 106 MMOL/L — SIGNIFICANT CHANGE UP (ref 96–108)
CO2 SERPL-SCNC: 22 MMOL/L — SIGNIFICANT CHANGE UP (ref 22–31)
CREAT SERPL-MCNC: 2.38 MG/DL — HIGH (ref 0.5–1.3)
EGFR: 29 ML/MIN/1.73M2 — LOW
GAS PNL BLDA: SIGNIFICANT CHANGE UP
GLUCOSE SERPL-MCNC: 98 MG/DL — SIGNIFICANT CHANGE UP (ref 70–99)
POTASSIUM SERPL-MCNC: 4.6 MMOL/L — SIGNIFICANT CHANGE UP (ref 3.5–5.3)
POTASSIUM SERPL-SCNC: 4.6 MMOL/L — SIGNIFICANT CHANGE UP (ref 3.5–5.3)
PROT SERPL-MCNC: 6.7 G/DL — SIGNIFICANT CHANGE UP (ref 6–8.3)
SODIUM SERPL-SCNC: 139 MMOL/L — SIGNIFICANT CHANGE UP (ref 135–145)

## 2025-02-06 RX ORDER — ESCITALOPRAM 10 MG/1
3 TABLET, FILM COATED ORAL
Qty: 0 | Refills: 0 | DISCHARGE
Start: 2025-02-06

## 2025-02-06 RX ORDER — PANTOPRAZOLE 20 MG/1
40 TABLET, DELAYED RELEASE ORAL
Refills: 0 | Status: DISCONTINUED | OUTPATIENT
Start: 2025-02-06 | End: 2025-02-06

## 2025-02-06 RX ORDER — EMPAGLIFLOZIN 10 MG/1
1 TABLET, FILM COATED ORAL
Refills: 0 | DISCHARGE

## 2025-02-06 RX ORDER — SENNOSIDES 8.6 MG
2 TABLET ORAL
Qty: 0 | Refills: 0 | DISCHARGE
Start: 2025-02-06

## 2025-02-06 RX ORDER — GABAPENTIN 800 MG/1
3 TABLET ORAL
Qty: 0 | Refills: 0 | DISCHARGE
Start: 2025-02-06

## 2025-02-06 RX ORDER — LAMOTRIGINE 100 MG/1
1 TABLET ORAL
Qty: 0 | Refills: 0 | DISCHARGE
Start: 2025-02-06

## 2025-02-06 RX ORDER — NYSTATIN 100000 U/G
1 POWDER TOPICAL
Qty: 0 | Refills: 0 | DISCHARGE
Start: 2025-02-06

## 2025-02-06 RX ORDER — POLYETHYLENE GLYCOL 3350 17 G/17G
17 POWDER, FOR SOLUTION ORAL
Qty: 0 | Refills: 0 | DISCHARGE
Start: 2025-02-06

## 2025-02-06 RX ORDER — PANTOPRAZOLE 20 MG/1
1 TABLET, DELAYED RELEASE ORAL
Qty: 0 | Refills: 0 | DISCHARGE
Start: 2025-02-06

## 2025-02-06 RX ORDER — IPRATROPIUM BROMIDE AND ALBUTEROL SULFATE .5; 2.5 MG/3ML; MG/3ML
3 SOLUTION RESPIRATORY (INHALATION)
Qty: 0 | Refills: 0 | DISCHARGE
Start: 2025-02-06

## 2025-02-06 RX ORDER — LACOSAMIDE 200 MG/1
100 TABLET, FILM COATED ORAL
Refills: 0 | Status: DISCONTINUED | OUTPATIENT
Start: 2025-02-06 | End: 2025-02-06

## 2025-02-06 RX ORDER — LURASIDONE HYDROCHLORIDE 80 MG/1
1 TABLET, FILM COATED ORAL
Qty: 0 | Refills: 0 | DISCHARGE
Start: 2025-02-06

## 2025-02-06 RX ORDER — LEVETIRACETAM 750 MG/1
1 TABLET, FILM COATED ORAL
Qty: 0 | Refills: 0 | DISCHARGE
Start: 2025-02-06

## 2025-02-06 RX ORDER — ATORVASTATIN CALCIUM 80 MG/1
1 TABLET, FILM COATED ORAL
Qty: 0 | Refills: 0 | DISCHARGE
Start: 2025-02-06

## 2025-02-06 RX ORDER — LIDOCAINE HYDROCHLORIDE 30 MG/G
1 CREAM TOPICAL
Qty: 0 | Refills: 0 | DISCHARGE
Start: 2025-02-06

## 2025-02-06 RX ORDER — LEVETIRACETAM 750 MG/1
750 TABLET, FILM COATED ORAL
Refills: 0 | Status: DISCONTINUED | OUTPATIENT
Start: 2025-02-06 | End: 2025-02-06

## 2025-02-06 RX ORDER — VALACYCLOVIR 1000 MG/1
1 TABLET ORAL
Qty: 0 | Refills: 0 | DISCHARGE
Start: 2025-02-06

## 2025-02-06 RX ORDER — LACOSAMIDE 200 MG/1
1 TABLET, FILM COATED ORAL
Qty: 0 | Refills: 0 | DISCHARGE
Start: 2025-02-06

## 2025-02-06 RX ADMIN — PANTOPRAZOLE 40 MILLIGRAM(S): 20 TABLET, DELAYED RELEASE ORAL at 12:57

## 2025-02-06 RX ADMIN — GABAPENTIN 150 MILLIGRAM(S): 800 TABLET ORAL at 17:59

## 2025-02-06 RX ADMIN — Medication 7500 UNIT(S): at 14:52

## 2025-02-06 RX ADMIN — ESCITALOPRAM 15 MILLIGRAM(S): 10 TABLET, FILM COATED ORAL at 12:58

## 2025-02-06 RX ADMIN — SEVELAMER CARBONATE 800 MILLIGRAM(S): 800 TABLET, FILM COATED ORAL at 07:00

## 2025-02-06 RX ADMIN — Medication 1 APPLICATION(S): at 07:00

## 2025-02-06 RX ADMIN — IPRATROPIUM BROMIDE AND ALBUTEROL SULFATE 3 MILLILITER(S): .5; 2.5 SOLUTION RESPIRATORY (INHALATION) at 18:00

## 2025-02-06 RX ADMIN — LEVETIRACETAM 750 MILLIGRAM(S): 750 TABLET, FILM COATED ORAL at 17:59

## 2025-02-06 RX ADMIN — Medication 1 APPLICATION(S): at 18:00

## 2025-02-06 RX ADMIN — VALACYCLOVIR 1000 MILLIGRAM(S): 1000 TABLET ORAL at 07:00

## 2025-02-06 RX ADMIN — LACOSAMIDE 120 MILLIGRAM(S): 200 TABLET, FILM COATED ORAL at 07:02

## 2025-02-06 RX ADMIN — ANTISEPTIC SURGICAL SCRUB 1 APPLICATION(S): 0.04 SOLUTION TOPICAL at 08:04

## 2025-02-06 RX ADMIN — ACETAMINOPHEN 650 MILLIGRAM(S): 160 SUSPENSION ORAL at 01:45

## 2025-02-06 RX ADMIN — LAMOTRIGINE 100 MILLIGRAM(S): 100 TABLET ORAL at 07:00

## 2025-02-06 RX ADMIN — LIDOCAINE HYDROCHLORIDE 1 PATCH: 30 CREAM TOPICAL at 07:01

## 2025-02-06 RX ADMIN — LACOSAMIDE 100 MILLIGRAM(S): 200 TABLET, FILM COATED ORAL at 18:00

## 2025-02-06 RX ADMIN — LEVETIRACETAM 400 MILLIGRAM(S): 750 TABLET, FILM COATED ORAL at 07:41

## 2025-02-06 RX ADMIN — GABAPENTIN 150 MILLIGRAM(S): 800 TABLET ORAL at 07:35

## 2025-02-06 RX ADMIN — VALACYCLOVIR 1000 MILLIGRAM(S): 1000 TABLET ORAL at 18:00

## 2025-02-06 RX ADMIN — Medication 7500 UNIT(S): at 07:01

## 2025-02-06 RX ADMIN — IPRATROPIUM BROMIDE AND ALBUTEROL SULFATE 3 MILLILITER(S): .5; 2.5 SOLUTION RESPIRATORY (INHALATION) at 12:58

## 2025-02-06 RX ADMIN — LAMOTRIGINE 100 MILLIGRAM(S): 100 TABLET ORAL at 17:59

## 2025-02-06 RX ADMIN — IPRATROPIUM BROMIDE AND ALBUTEROL SULFATE 3 MILLILITER(S): .5; 2.5 SOLUTION RESPIRATORY (INHALATION) at 07:00

## 2025-02-06 NOTE — PROGRESS NOTE ADULT - ASSESSMENT
70 y/o M with PMH of metastatic testicular cancer (s/p L orchiectomy, on etoposide/cisplatin chemo, last dose 6/2024), epilepsy (grand-mal seizures), schizophrenia, developmental delay, HTN, HLD, VAZQUEZ, stage III CKD, severe obesity, secondary hyperparathyroidism, anemia, thrombocytopenia who presented to ED for fall, possible seizure prior. On 1/17 patient had multiple RRTs called for grand mal seizure activity and  patient had x2 more episode and was ultimately given ativan 2mg, Vimpat load 200mg, and intubated for airway protection with MICU transfer. Extubated 1/19 - to AVAPS, post extubation with increased secretion. Downgraded to medical floor 1/21. Pt still with fevers, s/p RRT 1/23 AM for fever, hypoxia.  AVAPS - O2 sats 98% but tachypneic to 40, tachycardic, pt endorsing SOB. RRT ---> intubated for respiratory failure. also febrile and Hypertensive. CKD      1- CKD III  with SHAGUFTA   2- CHF   3- fevers  4- tachycardia  5- respiratory failure s/p extubation   6- HTN   7- hypernatremia     SHAGUFTA suspect ischemic ATN in setting of infection and hypotension   creatinine is improving to baseline  off diuretics now   non oliguric   na in range  low k diet  bronchodilators   still hypoxemic and cont O2 pulm following   phos in range, d/c sevelamer  strict I/O  PT  check lytes and cr in am

## 2025-02-06 NOTE — PROGRESS NOTE ADULT - PROBLEM SELECTOR PLAN 4
-By hx  -Reportedly with CPAP at home. Requiring AVAPS this admission, will need to monitor off AVAPS prior to discharge to assess need for NIV at home.
-By hx  -Reportedly with CPAP at home. Requiring AVAPS this admission, will need to monitor off AVAPS prior to discharge to assess need for NIV at home.    2/4: he has been using avaps here:  his vbgIS BETTER  : DC BIPAP:  RPT ABG IN AM  2/5: rpt abg was good today alfonsot bipap celena last night:  would check again am ; if good:  dc without NIV
-By hx  -Reportedly with CPAP at home. Requiring AVAPS this admission, will need to monitor off AVAPS prior to discharge to assess need for NIV at home.
-By hx  -Reportedly with CPAP at home. Requiring AVAPS this admission, will need to monitor off AVAPS prior to discharge to assess need for NIV at home.
-By hx  -Reportedly with CPAP at home. Requiring AVAPS this admission, will need to monitor off AVAPS prior to discharge to assess need for NIV at home.    2/4: he has been using avaps here:  hisvbgIS BETTER  : DC BIPAP:  RPT ABG IN AM
-By hx  -Reportedly with CPAP at home. Requiring AVAPS this admission, will need to monitor off AVAPS prior to discharge to assess need for NIV at home.    2/4: he has been using avaps here:  his vbgIS BETTER  : DC BIPAP:  RPT ABG IN AM  2/5: rpt abg was good today wihout bipap use last night:  would check again am ; if good:  dc without NIV  2/6: niv not necessary
-By hx  -Reportedly with CPAP at home. Requiring AVAPS this admission
-By hx  -Reportedly with CPAP at home. Requiring AVAPS this admission, will need to monitor off AVAPS prior to discharge to assess need for NIV at home.
-By hx  -Reportedly with CPAP at home. Requiring AVAPS this admission, will need to monitor off AVAPS prior to discharge to assess need for NIV at home.

## 2025-02-06 NOTE — PROGRESS NOTE ADULT - SUBJECTIVE AND OBJECTIVE BOX
SUBJECTIVE / OVERNIGHT EVENTS:          --------------------------------------------------------------------------------------------  LABS:      Phos  3.7     02-05        CAPILLARY BLOOD GLUCOSE                RADIOLOGY & ADDITIONAL TESTS:    Imaging Personally Reviewed:  [x] YES  [ ] NO    Consultant(s) Notes Reviewed:  [x] YES  [ ] NO    MEDICATIONS  (STANDING):  albuterol/ipratropium for Nebulization 3 milliLiter(s) Nebulizer every 6 hours  atorvastatin 20 milliGRAM(s) Oral at bedtime  bacitracin   Ointment 1 Application(s) Topical two times a day  chlorhexidine 2% Cloths 1 Application(s) Topical <User Schedule>  chlorhexidine 2% Cloths 1 Application(s) Topical <User Schedule>  escitalopram 15 milliGRAM(s) Oral daily  gabapentin Solution 150 milliGRAM(s) Oral two times a day  heparin   Injectable 7500 Unit(s) SubCutaneous every 8 hours  influenza  Vaccine (HIGH DOSE) 0.5 milliLiter(s) IntraMuscular once  lacosamide IVPB 100 milliGRAM(s) IV Intermittent every 12 hours  lamoTRIgine 100 milliGRAM(s) Oral two times a day  levETIRAcetam  IVPB 750 milliGRAM(s) IV Intermittent two times a day  lidocaine   4% Patch 1 Patch Transdermal daily  lurasidone 40 milliGRAM(s) Oral daily  pantoprazole  Injectable 40 milliGRAM(s) IV Push daily  polyethylene glycol 3350 17 Gram(s) Oral daily  senna 2 Tablet(s) Oral at bedtime  sevelamer carbonate 800 milliGRAM(s) Oral three times a day  valACYclovir 1000 milliGRAM(s) Oral every 12 hours    MEDICATIONS  (PRN):  acetaminophen     Tablet .. 650 milliGRAM(s) Oral every 6 hours PRN Mild Pain (1 - 3)  nystatin Powder 1 Application(s) Topical three times a day PRN fungal infection you may see and want to treat      Care Discussed with Consultants/Other Providers [x] YES  [ ] NO    Vital Signs Last 24 Hrs  T(C): 36.8 (06 Feb 2025 08:05), Max: 36.8 (05 Feb 2025 12:10)  T(F): 98.2 (06 Feb 2025 08:05), Max: 98.3 (05 Feb 2025 12:10)  HR: 82 (06 Feb 2025 08:05) (79 - 98)  BP: 141/81 (06 Feb 2025 08:05) (129/73 - 182/98)  BP(mean): --  RR: 18 (06 Feb 2025 08:05) (18 - 18)  SpO2: 100% (06 Feb 2025 08:05) (95% - 100%)    Parameters below as of 06 Feb 2025 08:05  Patient On (Oxygen Delivery Method): nasal cannula  O2 Flow (L/min): 3    I&O's Summary    PHYSICAL EXAM:  GENERAL: NAD,   HEAD:  Atraumatic, Normocephalic,  EYES: EOMI, PERRLA, conjunctiva and sclera clear  NECK: Supple, No JVD  CHEST/LUNG: Diminished bilaterally; No wheeze  HEART: Regular rate and rhythm; No murmurs, rubs, or gallops  ABDOMEN: Soft, Nontender, Nondistended; Bowel sounds present  NEURO: AAOx3, no focal weakness, 5/5 b/l extremity strength, b/l knee no arthritis, no effusion   EXTREMITIES:  2+ Peripheral Pulses, No clubbing, cyanosis, or edema  SKIN: No rashes or lesions

## 2025-02-06 NOTE — PROGRESS NOTE ADULT - PROBLEM SELECTOR PLAN 5
Hx of metastatic testicular cancer   -s/p L orchiectomy, on etoposide/cisplatin chemo, last dose 6/2024.
Hx of metastatic testicular cancer   -s/p L orchiectomy, on etoposide/cisplatin chemo, last dose 6/2024.
Hx of metastatic testicular cancer   -s/p L orchiectomy, on etoposide/cisplatin chemo, last dose 6/2024  -PET-CT 8/2024 with resolution of disease including LAD and pulmonary nodules  -MRI brain now with 5.4 mm enhancing lesion in the L middle cerebellar peduncle with associated edema suspicious for metastases  -MRI Neck with 4.2 cm R upper lobe mass/infiltrate - not seen CT chest 1/17  -CT chest 1/30 with no RUL mass  -Heme/onc f/u, plan for eventual repeat PET/CT.
Hx of metastatic testicular cancer   -s/p L orchiectomy, on etoposide/cisplatin chemo, last dose 6/2024.
Hx of metastatic testicular cancer   -s/p L orchiectomy, on etoposide/cisplatin chemo, last dose 6/2024  -PET-CT 8/2024 with resolution of disease including LAD and pulmonary nodules  -MRI brain now with 5.4 mm enhancing lesion in the L middle cerebellar peduncle with associated edema suspicious for metastases  -MRI Neck with 4.2 cm R upper lobe mass/infiltrate - not seen CT chest 1/17  -CT chest 1/30 with no RUL mass  -Heme/onc f/u, plan for eventual repeat PET/CT.
Hx of metastatic testicular cancer   -s/p L orchiectomy, on etoposide/cisplatin chemo, last dose 6/2024  -PET-CT 8/2024 with resolution of disease including LAD and pulmonary nodules  -MRI brain now with 5.4 mm enhancing lesion in the L middle cerebellar peduncle with associated edema suspicious for metastases  -MRI Neck with 4.2 cm R upper lobe mass/infiltrate - not seen CT chest 1/17  -CT chest 1/30 with no RUL mass  -Heme/onc f/u, plan for eventual repeat PET/CT.
Hx of metastatic testicular cancer   -s/p L orchiectomy, on etoposide/cisplatin chemo, last dose 6/2024.
Hx of metastatic testicular cancer   -s/p L orchiectomy, on etoposide/cisplatin chemo, last dose 6/2024.
Hx of metastatic testicular cancer   -s/p L orchiectomy, on etoposide/cisplatin chemo, last dose 6/2024  -PET-CT 8/2024 with resolution of disease including LAD and pulmonary nodules  -MRI brain now with 5.4 mm enhancing lesion in the L middle cerebellar peduncle with associated edema suspicious for metastases  -MRI Neck with 4.2 cm R upper lobe mass/infiltrate - not seen CT chest 1/17  -CT chest 1/30 with no RUL mass  -Heme/onc f/u, plan for eventual repeat PET/CT.
Hx of metastatic testicular cancer   -s/p L orchiectomy, on etoposide/cisplatin chemo, last dose 6/2024  -PET-CT 8/2024 with resolution of disease including LAD and pulmonary nodules  -MRI brain now with 5.4 mm enhancing lesion in the L middle cerebellar peduncle with associated edema suspicious for metastases  -MRI Neck with 4.2 cm R upper lobe mass/infiltrate - not seen CT chest 1/17. Suggest repeat CT chest to better evaluate.   -Heme/onc f/u, plan for eventual repeat PET/CT.

## 2025-02-06 NOTE — PROGRESS NOTE ADULT - ASSESSMENT
Patient is a 69 year old male with PMH of metastatic testicular cancer (s/p L orchiectomy, on etoposide/cisplatin chemo, last dose 6/2024), epilepsy (grand-mal seizures), schizophrenia, developmental delay, HTN, HLD, VAZQUEZ, stage III CKD, severe obesity, secondary hyperparathyroidism, anemia, thrombocytopenia who presented to ED for fall, possible seizure prior. Patient reported cough for few weeks with no other respiratory symptoms.   Admitted for further work up for syncope, c/f seizure   Influenza A  1/17 s/p RRTs for grand mal seizure activity, s/p intubation and transfer to MICU  - 1/17 CT chest with anterior bowing of posterior tracheal wall suggestive of tracheomalacia, trace R pleural effusion   - MRSA PCR screen negative    - 1/18 sputum culture negative    - s/p extubation 1/19   - 1/20 Bcx negative    - s/p ceftriaxone 1/17-1/21   - s/p tamiflu 1/17-1/21    Sepsis/sirs, AHRF possible flash pulmonary edema iso uncontrolled HTN, c/f aspiration pneumonia   - 1/23 s/p RRT am for hypoxia/inc WOB, febrile last night 100.6F and now 101F this am, tachycardia, leukocytosis 11k   - repeat COVID/Flu/RSV with known influenza A-likely shedding   - 1/23 afternoon s/p RRT for inc WOB on AVAPS, s/p intubation and transfer to MICU, required pressor support  - MRSA PCR screen negative   - 1/24 afebrile overnight, WBC uptrending, weaned off pressor earlier in am, on zosyn   - 1/25 CXR with R infiltrate, Bcx remain NGTD, WBC down  - 1/25 Scx with rare Pseudomonas aeruginosa -pansensitive   - MRI brain with metastatic disease, thyroid us with nodule  - 1/26 CT spine with no acute abn of spine, stable postop changes and hardware; noted with multifocal lung abn with bibasilar atelectasis or consolidation and patchy airspace opacities in the RUL  - 1/28 s/p extubation   - 1/30 CT chest with bronchial secretions with complete RML/RLL consolidation/collapse, scattered b/l upper lobe ggo, no RUL mass noted, possible resolving pneumonia   - reportedly h/o penicillin allergy--tolerating zosyn, removed from allergy list   - remains afebrile, WBC wnl, on NC, nontoxic appearing with no new complaints, completed antibiotics 1/30 am     s/p cefepime 1/23  s/p zosyn 1/23-1/30    Recommendations:   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  [facility]  Available on [employer] TEAMS - *PREFERRED*  [phone]  After 5pm on weekdays and all day on weekends - please call [phone]     Thank you for consulting us and involving us in the management of this patients case. In addition to reviewing history, imaging, documents, labs, microbiology, took into account antibiotic stewardship, local antibiogram and infection control strategies and potential transmission issues.

## 2025-02-06 NOTE — PROGRESS NOTE ADULT - REASON FOR ADMISSION
seizure, flu, elevated trop

## 2025-02-06 NOTE — DISCHARGE NOTE NURSING/CASE MANAGEMENT/SOCIAL WORK - PATIENT PORTAL LINK FT
You can access the FollowMyHealth Patient Portal offered by [facility] by registering at the following website: [email or web address]. By joining [facility]’s FollowMyHealth portal, you will also be able to view your health information using other applications (apps) compatible with our system.

## 2025-02-06 NOTE — PROGRESS NOTE ADULT - PROVIDER SPECIALTY LIST ADULT
Cardiology
Heme/Onc
Infectious Disease
Internal Medicine
MICU
MICU
Nephrology
Neurology
Pulmonology
Cardiology
Endocrinology
Endocrinology
Heme/Onc
Heme/Onc
Infectious Disease
Internal Medicine
MICU
Nephrology
Cardiology
Heme/Onc
Heme/Onc
Infectious Disease
Infectious Disease
Internal Medicine
Internal Medicine
MICU
Nephrology
Neurology
Cardiology
Heme/Onc
Infectious Disease
Internal Medicine
Internal Medicine
MICU
Nephrology
Internal Medicine
Internal Medicine
Neurology
Neurology
Pulmonology

## 2025-02-06 NOTE — DISCHARGE NOTE NURSING/CASE MANAGEMENT/SOCIAL WORK - NSDCFUADDAPPT_GEN_ALL_CORE_FT
APPTS ARE READY TO BE MADE: [x] YES    Best Family or Patient Contact (if needed):    Additional Information about above appointments (if needed):    1: Dawood Tuttle  2:   3:     Other comments or requests:

## 2025-02-06 NOTE — PROGRESS NOTE ADULT - NSPROGADDITIONALINFOA_GEN_ALL_CORE
dw acp
dw acp
fabiano team
called acp
dw acp
dw acp
2/1?/; he seems to be doing  ok ; cont BD and IS:  he stil has rill atelectasis:  needs chest pt and bd:  off antibiotics: cont current therapy:  try to decrease oxygen further down : oob to chair

## 2025-02-06 NOTE — DISCHARGE NOTE NURSING/CASE MANAGEMENT/SOCIAL WORK - FINANCIAL ASSISTANCE
[facility] provides services at a reduced cost to those who are determined to be eligible through [facility]’s financial assistance program. Information regarding [facility]’s financial assistance program can be found by going to [email or web address] or by calling [phone].

## 2025-02-06 NOTE — PROGRESS NOTE ADULT - NUTRITIONAL ASSESSMENT
This patient has been assessed with a concern for Malnutrition and has been determined to have a diagnosis/diagnoses of Severe protein-calorie malnutrition.    This patient is being managed with:   Diet NPO with Tube Feed-  Tube Feeding Modality: Orogastric  Nepro with Carb Steady (NEPRORTH)  Total Volume for 24 Hours (mL): 1170  Continuous  Starting Tube Feed Rate {mL per Hour}: 35  Increase Tube Feed Rate by (mL): 10     Every 4 hours  Until Goal Tube Feed Rate (mL per Hour): 65  Tube Feed Duration (in Hours): 18  Tube Feed Start Time: 11:00  Tube Feed Stop Time: 05:00  Entered: Jan 28 2025  9:16AM  
This patient has been assessed with a concern for Malnutrition and has been determined to have a diagnosis/diagnoses of Severe protein-calorie malnutrition.    This patient is being managed with:   Diet Pureed-  Moderately Thick Liquids (MODTHICKLIQS)  Low Sodium  Liquid via Teaspoon Only  Entered: Feb 2 2025 12:53AM  
This patient has been assessed with a concern for Malnutrition and has been determined to have a diagnosis/diagnoses of Severe protein-calorie malnutrition.    This patient is being managed with:   Diet Pureed-  Moderately Thick Liquids (MODTHICKLIQS)  Low Sodium  Liquid via Teaspoon Only  Entered: Feb 2 2025 12:53AM  
This patient has been assessed with a concern for Malnutrition and has been determined to have a diagnosis/diagnoses of Severe protein-calorie malnutrition.    This patient is being managed with:   Diet NPO with Tube Feed-  Tube Feeding Modality: Orogastric  Nepro with Carb Steady (NEPRORTH)  Total Volume for 24 Hours (mL): 1170  Continuous  Starting Tube Feed Rate {mL per Hour}: 35  Increase Tube Feed Rate by (mL): 10     Every 4 hours  Until Goal Tube Feed Rate (mL per Hour): 65  Tube Feed Duration (in Hours): 18  Tube Feed Start Time: 11:00  Tube Feed Stop Time: 05:00  Entered: Jan 28 2025  9:16AM  
This patient has been assessed with a concern for Malnutrition and has been determined to have a diagnosis/diagnoses of Severe protein-calorie malnutrition.    This patient is being managed with:   Diet Pureed-  Moderately Thick Liquids (MODTHICKLIQS)  Liquid via Teaspoon Only  Tube Feeding Modality: Nasogastric  Nepro with Carb Steady (NEPRORTH)  Total Volume for 24 Hours (mL): 1170  Continuous  Starting Tube Feed Rate {mL per Hour}: 35     Every 4 hours  Until Goal Tube Feed Rate (mL per Hour): 65  Tube Feed Duration (in Hours): 18  Tube Feed Start Time: 11:00  Tube Feed Stop Time: 05:00  Entered: Jan 30 2025  5:28PM  
This patient has been assessed with a concern for Malnutrition and has been determined to have a diagnosis/diagnoses of Severe protein-calorie malnutrition.    This patient is being managed with:   Diet Pureed-  Moderately Thick Liquids (MODTHICKLIQS)  Low Sodium  Liquid via Teaspoon Only  Entered: Feb 2 2025 12:53AM  
This patient has been assessed with a concern for Malnutrition and has been determined to have a diagnosis/diagnoses of Severe protein-calorie malnutrition.    This patient is being managed with:   Diet Pureed-  Moderately Thick Liquids (MODTHICKLIQS)  Liquid via Teaspoon Only  Tube Feeding Modality: Nasogastric  Nepro with Carb Steady (NEPRORTH)  Total Volume for 24 Hours (mL): 1170  Continuous  Starting Tube Feed Rate {mL per Hour}: 35     Every 4 hours  Until Goal Tube Feed Rate (mL per Hour): 65  Tube Feed Duration (in Hours): 18  Tube Feed Start Time: 11:00  Tube Feed Stop Time: 05:00  Entered: Jan 30 2025  5:28PM  
This patient has been assessed with a concern for Malnutrition and has been determined to have a diagnosis/diagnoses of Severe protein-calorie malnutrition.    This patient is being managed with:   Diet Pureed-  Moderately Thick Liquids (MODTHICKLIQS)  No Concentrated Potassium  Low Sodium  Liquid via Teaspoon Only  Entered: Feb 4 2025  2:40PM  
This patient has been assessed with a concern for Malnutrition and has been determined to have a diagnosis/diagnoses of Severe protein-calorie malnutrition.    This patient is being managed with:   Diet Pureed-  Moderately Thick Liquids (MODTHICKLIQS)  No Concentrated Potassium  Low Sodium  Liquid via Teaspoon Only  Entered: Feb 4 2025  2:40PM

## 2025-02-06 NOTE — PROGRESS NOTE ADULT - ASSESSMENT
69M w/ hx of metastatic testicular cancer (s/p L orchiectomy, on etoposide/cisplatin chemo), epilepsy (grand-mal seizures), schizophrenia, developmental delay, HTN, HLD, VAZQUEZ, stage III CKD, severe obesity, secondary hyperparathyroidism, anemia, thrombocytopenia who presented to ED for fall, possible seizure prior.    Plan:    # Hypoxia: Sepsis/sirs, AHRF possible flash pulmonary edema iso uncontrolled HTN, c/f aspiration pneumonia : Improving  - S/p RRT 1/23 am and 1/23 pm, for inc WOB on AVAPS, s/p intubation and transfer to MICU, required pressor support-> downgraded 1/29  - Sepsis protocol initiated  - CXR w/ mild pulm congestion.  - Diuresis as tolerated  - Thyroid US w/ nodule  - NGT in place, c/w TF's as tolerated-> FEEST -> rec pureed /mod thick liquids   - 1/25 CXR with R infiltrate, Bcx remain NGTD, WBC down  - 1/25 Scx with rare Pseudomonas aeruginosa -pansensitive   - MRI brain with metastatic disease  - 1/28 s/p extubation   - Discontinued zosyn --completed 7d course 1/29  - Monitor off antibiotics per ID rec's  - ID and Pulm following    # Syncopal seizure: Improved, downgraded  - Patient with hx of epilepsy, generalized grand mal seizures  - C/w Lamictal and Keppra  - CTH/C spine/MF negative for acute ICH, notable for mild R hematoma  - 1/17 sp multiple RRTs  for grand mal seizure activity and was initially recommended sepsis workup and antipyretics given T103F, but patient had x2 more episode and was ultimately given ativan 2mg, vimpat load 200mg, and intubated for airway protection with MICU transfer.   - Extubated 1/20  - Keppra 1.5g BID, Lamotrigine 100mg BID, Gabapentin 300mg BID, Vimpat 150mg BID  - Ativan for seizures PRM  - Per Neuro -> MRI brain w and wo contrast to look for potential seizure foci that could cause increased frequency especially given hx of metastatic testicular cancer; deferred MRI given copious secretions   - s/p vEEG, negative for seizures  - Care per MICU-> downgraded 1/21, back to ICU 1/23 for Hypoxia, Increased WOB, Downgraded 1/29    # Brain lesion:  - MRI brain now with 5.4 mm enhancing lesion in the LEFT middle cerebellar peduncle with associated edema suspicious for metastases  - MRI Lumbar negative for acute disease  - Neurology recs noted, new lesion not likely to cause seizure  - Seen by Heme Onc-> will need another PET-CT, can be completed outpatient once stable if concern can proceed with biopsy at that time   - Heme Onc following    # +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:  - 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    # Flu:  - sp Tamiflu completed 5d on 1/22   - Monitor temps/WBC  - ID following    # CKD3:   - Monitor I/O's  - Serial Cr  - Avoid nephrotoxins  - Renally dose medications  - Renal following->  Renal fnx improving     # HTN/ HLD:  - C/w CV meds    # Pneumonia:  - Completed ceftriaxone 5d course on 1/21  - Retreated for aspiration pneumonia in ICU on 1/23  - ID following    # Schizophrenia/Depression:  - C/w current meds    # VAZQUEZ:  - CPAP at night    # Hx of metastatic testicular cancer, s/p L orchiectomy, on etoposide/cisplatin chemo, last dose on 6/21/24:  - Outpt Oncology follow-up    # DVT ppx:  - Heparin sq    Dispo: [facility]    [facility]  [phone]

## 2025-02-06 NOTE — PROGRESS NOTE ADULT - PROBLEM SELECTOR PLAN 1
-S/p intubation in MICU in the setting of grand mal seizures, pneumonia, Flu  -Completed course of ABX, tamiflu for Flu. Extubated to AVAPS 1/19  -S/p RRT 1/23 AM for fevers, hypoxia requiring AVAPS. Likely aspiration event +/- flash pulmonary edema  -S/p 2nd RRT 1/23 PM for increased WOB on AVAPS, intubated and tx to MICU   -Extubated to AVAPS 1/28, now using qHS   -Continue AVAPS  ePAP 5 Rate 14 Max pressure 35 Min Pressure 15 fio2 40% qHS and PRN for increased WOB for now. Pt has CPAP at home for VAZQUEZ, will need to monitor off AVAPs prior to discharge.  -Keep sats >90% with O2 PRN. Wean o2 as tolerated.   -CXR 1/29 with low lung volumes  -Repeat CT chest with bronchial secretions, RML/RLL atelectasis +/- underlying infiltrate, scattered b/l upper lobe GGO. ABX completed, ID f/u.   -Continue bronchodilators. Start Mucomyst 20% 3ml q6h x3 days (give with Duoneb)  -Chest PT   -ABG with no Co2 retention.    2/2: seems OK:  has rll atelectasis:  hopefully will improve on its own so that bronc does not have to be dne:  cont conservative rx for now:  2/3: repeat cxr:  dw sister about possibility of bronch : she is  not interested in it at this time:  2/4: cxr doi ne yesterday  /: to me looks pretty good:  I can see the border of left and right  HD:  may be atelectasis improved: In ay case the relative at bedside does not want to do any bronch on hi, at this time  2/5: his ABG is pretty good:  was off  niv last night:  will do again am : keep off bipap:  if abg is good can go without bipap  2/6: ABG today is also OK: for dc without NIV today  : woing well : alert and awake:

## 2025-02-06 NOTE — PROGRESS NOTE ADULT - SUBJECTIVE AND OBJECTIVE BOX
[facility]   [phone]  RENAL FOLLOW UP NOTE  --------------------------------------------------------------------------------  Chief Complaint:    24 hour events/subjective:    patient seen and examined.   +cough    PAST HISTORY  --------------------------------------------------------------------------------  No significant changes to PMH, PSH, FHx, SHx, unless otherwise noted    ALLERGIES & MEDICATIONS  --------------------------------------------------------------------------------  Allergies    penicillin (Hives)  seasonal allergies (Unknown)    Intolerances    latex (Rash)    Standing Inpatient Medications  albuterol/ipratropium for Nebulization 3 milliLiter(s) Nebulizer every 6 hours  atorvastatin 20 milliGRAM(s) Oral at bedtime  bacitracin   Ointment 1 Application(s) Topical two times a day  chlorhexidine 2% Cloths 1 Application(s) Topical <User Schedule>  chlorhexidine 2% Cloths 1 Application(s) Topical <User Schedule>  escitalopram 15 milliGRAM(s) Oral daily  gabapentin Solution 150 milliGRAM(s) Oral two times a day  heparin   Injectable 7500 Unit(s) SubCutaneous every 8 hours  influenza  Vaccine (HIGH DOSE) 0.5 milliLiter(s) IntraMuscular once  lacosamide IVPB 100 milliGRAM(s) IV Intermittent every 12 hours  lamoTRIgine 100 milliGRAM(s) Oral two times a day  levETIRAcetam  IVPB 750 milliGRAM(s) IV Intermittent two times a day  lidocaine   4% Patch 1 Patch Transdermal daily  lurasidone 40 milliGRAM(s) Oral daily  pantoprazole  Injectable 40 milliGRAM(s) IV Push daily  polyethylene glycol 3350 17 Gram(s) Oral daily  senna 2 Tablet(s) Oral at bedtime  sevelamer carbonate 800 milliGRAM(s) Oral three times a day  valACYclovir 1000 milliGRAM(s) Oral every 12 hours    PRN Inpatient Medications  acetaminophen     Tablet .. 650 milliGRAM(s) Oral every 6 hours PRN  nystatin Powder 1 Application(s) Topical three times a day PRN      REVIEW OF SYSTEMS  --------------------------------------------------------------------------------    Gen: denies fevers/chills,  CVS: denies chest pain/palpitations  Resp: denies SOB/Cough  GI: Denies N/V/Abd pain  : Denies dysuria    VITALS/PHYSICAL EXAM  --------------------------------------------------------------------------------  T(C): 36.8 (02-06-25 @ 08:05), Max: 36.8 (02-05-25 @ 12:10)  HR: 82 (02-06-25 @ 08:05) (79 - 98)  BP: 141/81 (02-06-25 @ 08:05) (129/73 - 182/98)  RR: 18 (02-06-25 @ 08:05) (18 - 18)  SpO2: 100% (02-06-25 @ 08:05) (95% - 100%)  Wt(kg): --        Physical Exam:  	              Gen: comfortable appearing  on O2   	Pulm: decrease bs  no rales  +coarse, no wheezing  	CV: tachy  S1S2; no rub  	Abd: hypoactive bs soft distended  	UE: Warm, no cyanosis  no clubbing,  no edema;   	LE: Warm, no cyanosis  no clubbing, no edema  	Neuro:  alert and oriented    LABS/STUDIES  --------------------------------------------------------------------------------    139  |  106  |  47  ----------------------------<  98      [02-06-25 @ 10:35]  4.6   |  22  |  2.38        Ca     9.9     [02-06-25 @ 10:35]      Phos  3.7     [02-05-25 @ 06:43]    TPro  6.7  /  Alb  3.6  /  TBili  0.4  /  DBili  x   /  AST  58  /  ALT  51  /  AlkPhos  166  [02-06-25 @ 10:35]          Creatinine Trend:  SCr 2.38 [02-06 @ 10:35]  SCr 2.30 [02-04 @ 06:34]  SCr 2.12 [02-03 @ 07:19]  SCr 2.52 [02-02 @ 12:37]  SCr 2.96 [02-01 @ 06:51]            TSH 0.87      [01-25-25 @ 11:31]

## 2025-02-06 NOTE — PROGRESS NOTE ADULT - SUBJECTIVE AND OBJECTIVE BOX
Date of Service: 02-06-25 @ 15:10    Patient is a 69y old  Male who presents with a chief complaint of seizure, flu, elevated trop (06 Feb 2025 11:55)      Any change in ROS: seems OK:  remained stable off NIV:     MEDICATIONS  (STANDING):  albuterol/ipratropium for Nebulization 3 milliLiter(s) Nebulizer every 6 hours  atorvastatin 20 milliGRAM(s) Oral at bedtime  bacitracin   Ointment 1 Application(s) Topical two times a day  chlorhexidine 2% Cloths 1 Application(s) Topical <User Schedule>  chlorhexidine 2% Cloths 1 Application(s) Topical <User Schedule>  escitalopram 15 milliGRAM(s) Oral daily  gabapentin Solution 150 milliGRAM(s) Oral two times a day  heparin   Injectable 7500 Unit(s) SubCutaneous every 8 hours  influenza  Vaccine (HIGH DOSE) 0.5 milliLiter(s) IntraMuscular once  lacosamide 100 milliGRAM(s) Oral two times a day  lamoTRIgine 100 milliGRAM(s) Oral two times a day  levETIRAcetam 750 milliGRAM(s) Oral two times a day  lidocaine   4% Patch 1 Patch Transdermal daily  lurasidone 40 milliGRAM(s) Oral daily  pantoprazole    Tablet 40 milliGRAM(s) Oral before breakfast  polyethylene glycol 3350 17 Gram(s) Oral daily  senna 2 Tablet(s) Oral at bedtime  valACYclovir 1000 milliGRAM(s) Oral every 12 hours    MEDICATIONS  (PRN):  acetaminophen     Tablet .. 650 milliGRAM(s) Oral every 6 hours PRN Mild Pain (1 - 3)  nystatin Powder 1 Application(s) Topical three times a day PRN fungal infection you may see and want to treat    Vital Signs Last 24 Hrs  T(C): 36.8 (06 Feb 2025 08:05), Max: 36.8 (06 Feb 2025 08:05)  T(F): 98.2 (06 Feb 2025 08:05), Max: 98.2 (06 Feb 2025 08:05)  HR: 82 (06 Feb 2025 08:05) (79 - 87)  BP: 141/81 (06 Feb 2025 08:05) (129/73 - 182/98)  BP(mean): --  RR: 18 (06 Feb 2025 08:05) (18 - 18)  SpO2: 100% (06 Feb 2025 08:05) (95% - 100%)    Parameters below as of 06 Feb 2025 08:05  Patient On (Oxygen Delivery Method): nasal cannula  O2 Flow (L/min): 3      I&O's Summary        Physical Exam:   GENERAL: NAD, well-groomed, well-developed  HEENT: BREE/   Atraumatic, Normocephalic  ENMT: No tonsillar erythema, exudates, or enlargement; Moist mucous membranes, Good dentition, No lesions  NECK: Supple, No JVD, Normal thyroid  CHEST/LUNG: Clear to auscultaion  CVS: Regular rate and rhythm; No murmurs, rubs, or gallops  GI: : Soft, Nontender, Nondistended; Bowel sounds present  NERVOUS SYSTEM:  Alert & Oriented X3  EXTREMITIES:  2+ Peripheral Pulses, No clubbing, cyanosis, or edema  LYMPH: No lymphadenopathy noted  SKIN: No rashes or lesions  ENDOCRINOLOGY: No Thyromegaly  PSYCH: Appropriate    Labs:  ABG - ( 06 Feb 2025 04:13 )  pH, Arterial: 7.44  pH, Blood: x     /  pCO2: 46    /  pO2: 149   / HCO3: 31    / Base Excess: 6.3   /  SaO2: 99.6            22                            8.5    7.09  )-----------( 192      ( 04 Feb 2025 06:35 )             27.4                         9.6    7.04  )-----------( 221      ( 03 Feb 2025 07:21 )             30.7     02-06    139  |  106  |  47[H]  ----------------------------<  98  4.6   |  22  |  2.38[H]  02-04    142  |  110[H]  |  61[H]  ----------------------------<  91  5.1   |  19[L]  |  2.30[H]  02-03    148[H]  |  114[H]  |  69[H]  ----------------------------<  93  4.7   |  20[L]  |  2.12[H]    Ca    9.9      06 Feb 2025 10:35  Phos  3.7     02-05    TPro  6.7  /  Alb  3.6  /  TBili  0.4  /  DBili  x   /  AST  58[H]  /  ALT  51[H]  /  AlkPhos  166[H]  02-06  TPro  6.6  /  Alb  3.5  /  TBili  0.4  /  DBili  x   /  AST  52[H]  /  ALT  40  /  AlkPhos  128[H]  02-03    CAPILLARY BLOOD GLUCOSE          LIVER FUNCTIONS - ( 06 Feb 2025 10:35 )  Alb: 3.6 g/dL / Pro: 6.7 g/dL / ALK PHOS: 166 U/L / ALT: 51 U/L / AST: 58 U/L / GGT: x             Urinalysis Basic - ( 06 Feb 2025 10:35 )    Color: x / Appearance: x / SG: x / pH: x  Gluc: 98 mg/dL / Ketone: x  / Bili: x / Urobili: x   Blood: x / Protein: x / Nitrite: x   Leuk Esterase: x / RBC: x / WBC x   Sq Epi: x / Non Sq Epi: x / Bacteria: x  rad< from: CT Chest No Cont (01.30.25 @ 13:37) >  osseous lesion. Old right clavicular and bilateral anterior rib fractures.    IMPRESSION:  New secretions at the level of the bronchus intermedius with complete   right middle/lower bilobar consolidation/collapse.    New scattered bilateral upper lobe groundglass opacities, concerning for   infection.    --- End of Report ---    < end of copied text >            RECENT CULTURES:        RESPIRATORY CULTURES:          Studies  Chest X-RAY  CT SCAN Chest   Venous Dopplers: LE:   CT Abdomen  Others

## 2025-02-06 NOTE — PROGRESS NOTE ADULT - ASSESSMENT
Mr. Gr is a 69 year old male with PMHx of seizure disorder, sleep apnea, HTN, chronic idiopathic constipation, stage III CKD, severe obesity, secondary hyperparathyroidism, anemia, thrombocytopenia, hyperlipidemia, testicular cancer s/p EP x 4 under the care of Dr. Ovalles who is admitted s/p fall in setting of possible seizure since 01/16/2025. He was intubated early in his admission due to seziures and extubated 1/21/2025 and then again intubated in setting of respiratory distress, requiring diuresis, with SHAGUFTA on CKD, in the MICU. Imaging shows possible brain metastatic disease and lung mass as well as abnormal thyroid nodule.     Former smoker, quit 14y prior, denied ETOH, drug use. Lives at home. Does not have children. Denied family history of blood disorders or malignancy.  Denied previous workplace related exposures.  Denied previous history of blood transfusions.  Denied history of thromboses.  Denied history of  requiring anticoagulation.        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  Margins were negative.  pT3Nx. CT C/A/P with few left para-aortic and left iliac chain lymph nodes largest measuring 8.1 cm left para-aortic node, bilateral subcentimeter noncalcified pulmonary nodules measuring up to 7 mm. AFP, LDH, hCG were all elevated. 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 Completed four cycles of adjuvant therapy with Cisplatin+Etoposide, with cisplatin dose reduced 50% and Etoposide 50% due to thrombocytopenia at that time. Subsequent scans 08/2024 showed resolution of disease and negative markers,         01/26/2025: continues intubated and sedated in the ICU, discussed with ICU team yesterday, pending markers, endocrinology eval noted     01/27/2025:  AFP/BHCG normal,     01/28/2025: continues in MICU intubated, off sedation, Neurology recs noted, L cerebellar lesion not likely be cause of seizure, GOC noted per palliative discussion, pts primary Oncologist aware of current events as well     01/29/2025: , awake and alert, extubated 01/28/2025, continues in MICU     01/30/2025: now on floor, bipap, no overnight events noted    01/31/2025:  repeat CT chest w/o contrast noted with new secretions at the level of the bronchus intermedius with complete right middle/lower bilobar consolidation/collapse and new scattered bilateral upper lobe groundglass opacities, concerning for infection. Discussed with pts wife and discussed with primary Oncologist Dr. Ovalles, agree with current plan    02/01/2025:  no overnight events noted, recapped hospitalization, aware of outpatient plan once he is stable, no signs of bleeding     02/02/2025:  renal function improving, HSV1 of lip positive         # Brain lesion  # Seizures  - Seizures noted, pt with hx of seizures  - MRI brain now with 5.4 mm enhancing lesion in the LEFT middle cerebellar peduncle with associated edema suspicious for metastases  - MRI Lumbar negative for acute disease  - Small lesion without mass effect or edema, recommended to correlate per neurology if foci and locus are concordant with seizure activity  - Neurology recs noted, new lesion not likely to cause seizure  - It is rare, but nonetheless not impossible, for testicular cancer to be metastatic to the brain  - He will need another PET-CT, can be completed outpatient once stable if concern can proceed with biopsy at that time   - Endocrinology eval for thyroid nodule  - AFP/BHCG normal,      # Thyroid nodule  - US Thyroid 01/24/2025 with 1.6cm complex mixed solid cystic hypoechoic nodule in the lower pole of the right thyroid lobe, TIRADS 5.  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 Ovalles 12/03/2024, markers continued to be negative  - See above in regards to brain lesion  - MRI Neck shows 4.2 cm RIGHT upper lobe mass/infiltrate  - Recommend IR guided biopsy of RUL mass if PET-CT concordant/suggestive of malignancy, PET-CT as outpt and then consideration after better characterization   - Repeat CT chest w/o contrast noted with new secretions at the level of the bronchus intermedius with complete right middle/lower bilobar consolidation/collapse and new scattered bilateral upper lobe groundglass opacities, concerning for infection  - Discussed with pts wife and discussed with primary Oncologist Dr. 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 Gr, please do not hesitate to call or text me if you have further questions or concerns.        Brayan Mart MD  [facility] [place]   Division of Hematology/Oncology  [facility], [address]  [place] [address]  P: [phone]  F: [phone]    Attestation:    ----Pt evalulated including face-to-face interaction in addition to chart review, reviewing treatment plan, and managing the patient’s chronic diagnoses as listed in the assessment----

## 2025-02-06 NOTE — PROGRESS NOTE ADULT - PROBLEM SELECTOR PLAN 6
-Per renal.

## 2025-02-06 NOTE — PROGRESS NOTE ADULT - SUBJECTIVE AND OBJECTIVE BOX
DATE OF SERVICE: 02-06-25      Patient is a 69y old  Male who presents with a chief complaint of seizure, flu, elevated trop (06 Feb 2025 15:09)      INTERVAL HISTORY: feels well          PHYSICAL EXAM:  T(C): 36.6 (02-06-25 @ 15:37), Max: 36.8 (02-06-25 @ 08:05)  HR: 86 (02-06-25 @ 15:37) (82 - 86)  BP: 134/79 (02-06-25 @ 15:37) (134/79 - 182/98)  RR: 18 (02-06-25 @ 15:37) (18 - 18)  SpO2: 99% (02-06-25 @ 15:37) (95% - 100%)  Wt(kg): --  I&O's Summary        Appearance: In no distress	  HEENT:    PERRL, EOMI	  Cardiovascular:  S1 S2, No JVD  Respiratory: Lungs clear to auscultation	  Gastrointestinal:  Soft, Non-tender, + BS	  Vascularature:  No edema of LE  Psychiatric: Appropriate affect   Neuro: no acute focal deficits           02-06    139  |  106  |  47[H]  ----------------------------<  98  4.6   |  22  |  2.38[H]    Ca    9.9      06 Feb 2025 10:35  Phos  3.7     02-05    TPro  6.7  /  Alb  3.6  /  TBili  0.4  /  DBili  x   /  AST  58[H]  /  ALT  51[H]  /  AlkPhos  166[H]  02-06        Labs personally reviewed      Assessment and Plan:      69M w/ hx of metastatic testicular cancer (s/p L orchiectomy, on etoposide/cisplatin chemo, last dose 6/2024), epilepsy (grand-mal seizures), schizophrenia, developmental delay, HTN, HLD, VAZQUEZ, stage III CKD, obesity, secondary hyperparathyroidism, anemia, thrombocytopenia, ? spinal surgery who was admitted on 1/16/25 for fall and syncope. On 1/17 patient had multiple RRTs called for grand mal seizure activity and  patient had x2 more episode and was ultimately given ativan 2mg, Vimpat load 200mg, and intubated for airway protection with MICU transfer. Extubated 1/19 - to AVAPS, post extubation with increased secretion, now better; downgraded 1/21; While on medical floors; patient developed respiratory distress; now intubated for suspected aspiration pna vs flash pulmonary edema. Extubated 1/28/25.    Problem/Plan #1: Acute Hypoxic Respiratory Failure  - Intubated for airway protection i/s/o multiple RRTs for grand mal seizure activity, Extubated 1/19 to AVAPS  - Reintubated 1/23 after hypoxic episode for suspected aspiration pna vs flash pulmonary edema.   - BNP 3949  - CXR shows mild pulm edema on 1/16; CXR 1/25  Bibasilar atelectasis. Mild interstitial edema.. Improved aeration in the right upper lobe  - TTE with preserved EF, no WMA, dilated aortic root  - Was on Bumex IV - stopped on 1/25 due to ^ creatinine 4.31 from 3.92  - CT chest 1/30 - concerning for infection, complete right middle/lower lobe consolidation/collapse; no pleural or pericardial effusion; pulm & ID following  - Check proBNP 1/31 1134 (down from 3949 on 1/23)  - bronch recommended, family does not want to pursue     Problem/Plan #2: Hypertensive Urgency  - BP now stable off anti-hypertensives. Will slowly re-implement as BP recovers.    Problem/Plan #3: HLD  - c/w atorvastatin 20mg daily    Problem/Plan #4: DVT Ppx  - c/w SQ heparin            Gus Andrew DO [facility]  Cardiovascular Medicine  [address] [place] [address], [address]  Office: [phone]  Available via Text/call on Microsoft Teams

## 2025-02-06 NOTE — PROGRESS NOTE ADULT - PROBLEM SELECTOR PLAN 2
-Febrile to 101F during RRT 1/23  -? aspiration event  -RVP noted, remains + for Flu A   -F/u BC, UC  -LE duplex neg for DVT   -Trend leukocytosis, fever curve  -ABX as per ID.  -he has been afebrile in last 24 hours  : c ariana current rx:  heis on full vent support and sedated  -afebrile in last 24 hours on 27th :  c ont current care  1/28: afebrile:  cont antibiotics:  monitor resp status  1/29: responded:  no more fever
-Febrile to 101F during RRT 1/23  -? aspiration event  -RVP noted, remains + for Flu A   -F/u BC, UC  -LE duplex neg for DVT   -Trend leukocytosis, fever curve  -ABX as per ID.  on 1/265:  he has been afebrile in last 24 hours  : c ont current rx:  heis on full vent support and sedated
-S/p RRT 1/23 for fevers, hypoxia. Likely aspiration event  -Sputum cx + pseudomonas  -Continue bronchodilators  -MRI Neck 1/25 with 4.2 cm R upper lobe mass/infiltrate - not seen CT chest 1/17.  -Repeat CT chest with bronchial secretions, RML/RLL atelectasis +/- underlying infiltrate, scattered b/l upper lobe GGO. No RUL mass noted. - ? resolving PNA   -ABX completed, ID f/u.  2/2 : remains afebrile and WBC is normal  2/3: seems OK:  coughs with IS  2/3 ; on valtrex:
-S/p RRT 1/23 for fevers, hypoxia. Likely aspiration event  -Sputum cx + pseudomonas  -Continue bronchodilators  -MRI Neck 1/25 with 4.2 cm R upper lobe mass/infiltrate - not seen CT chest 1/17.  -Repeat CT chest with bronchial secretions, RML/RLL atelectasis +/- underlying infiltrate, scattered b/l upper lobe GGO. No RUL mass noted. - ? resolving PNA   -ABX completed, ID f/u.  2/2 : remains afebrile and WBC is normal  2/3: seems OK:  coughs with IS  2/3 ; on valtrex:  2/4:  pna resolved:
-S/p RRT 1/23 for fevers, hypoxia. Likely aspiration event  -Sputum cx + pseudomonas  -Continue bronchodilators  -MRI Neck 1/25 with 4.2 cm R upper lobe mass/infiltrate - not seen CT chest 1/17.  -Repeat CT chest with bronchial secretions, RML/RLL atelectasis +/- underlying infiltrate, scattered b/l upper lobe GGO. No RUL mass noted. - ? resolving PNA   -ABX completed, ID f/u.
-S/p RRT 1/23 for fevers, hypoxia. Likely aspiration event  -Sputum cx + pseudomonas  -Continue bronchodilators  -MRI Neck 1/25 with 4.2 cm R upper lobe mass/infiltrate - not seen CT chest 1/17.  -Repeat CT chest with bronchial secretions, RML/RLL atelectasis +/- underlying infiltrate, scattered b/l upper lobe GGO. No RUL mass noted. - ? resolving PNA   -ABX completed, ID f/u.  2/2 : remains afebrile and WBC is normal  2/3: seems OK:  coughs with IS  2/3 ; on valtrex:  2/4:  pna resolved:  2/6: stable:  no fever:
-S/p RRT 1/23 for fevers, hypoxia. Likely aspiration event  -Sputum cx + pseudomonas  -Continue bronchodilators  -MRI Neck 1/25 with 4.2 cm R upper lobe mass/infiltrate - not seen CT chest 1/17.  -Repeat CT chest with bronchial secretions, RML/RLL atelectasis +/- underlying infiltrate, scattered b/l upper lobe GGO. No RUL mass noted. - ? resolving PNA   -ABX completed, ID f/u.  2/2 : remains afebrile and WBC is normal
-S/p RRT 1/23 for fevers, hypoxia. Likely aspiration event  -Sputum cx + pseudomonas  -Continue bronchodilators  -MRI Neck 1/25 with 4.2 cm R upper lobe mass/infiltrate - not seen CT chest 1/17.  -Repeat CT chest with bronchial secretions, RML/RLL atelectasis +/- underlying infiltrate, scattered b/l upper lobe GGO. No RUL mass noted. - ? resolving PNA   -ABX completed, ID f/u.  2/2 : remains afebrile and WBC is normal  2/3: seems OK:  coughs with IS  2/3 ; on valtrex:  2/4:  pna resolved:
-Febrile to 101F during RRT 1/23  -? aspiration event  -RVP noted, remains + for Flu A   -F/u BC, UC  -LE duplex neg for DVT   -Trend leukocytosis, fever curve  -ABX as per ID.  -he has been afebrile in last 24 hours  : chrissy lane current rx:  heis on full vent support and sedated  -afebrile in last 24 hours on 27th :  chrissy lane current care
-Febrile to 101F during RRT 1/23  -? aspiration event  -RVP noted, remains + for Flu A   -F/u BC, UC  -LE duplex neg for DVT   -Trend leukocytosis, fever curve  -ABX as per ID.
-S/p RRT 1/23 for fevers, hypoxia. Likely aspiration event  -Sputum cx + pseudomonas  -Continue bronchodilators  -ABX as per ID  -MRI Neck 1/25 with 4.2 cm R upper lobe mass/infiltrate - not seen CT chest 1/17. Suggest repeat CT chest to better evaluate.
-Febrile to 101F during RRT 1/23  -? aspiration event  -RVP noted, remains + for Flu A   -F/u BC, UC  -LE duplex neg for DVT   -Trend leukocytosis, fever curve  -ABX as per ID.  -he has been afebrile in last 24 hours  : chrissy lane current rx:  heis on full vent support and sedated  -afebrile in last 24 hours on 27th :  chrissy lane current care  1/28: afebrile:  cont antibiotics:  monitor resp status
-S/p RRT 1/23 for fevers, hypoxia. Likely aspiration event  -Sputum cx + pseudomonas  -Continue bronchodilators  -MRI Neck 1/25 with 4.2 cm R upper lobe mass/infiltrate - not seen CT chest 1/17.  -Repeat CT chest with bronchial secretions, RML/RLL atelectasis +/- underlying infiltrate, scattered b/l upper lobe GGO. No RUL mass noted. - ? resolving PNA   -ABX completed, ID f/u.

## 2025-02-06 NOTE — PROGRESS NOTE ADULT - PROBLEM SELECTOR PROBLEM 6
CKD (chronic kidney disease)

## 2025-02-06 NOTE — PROGRESS NOTE ADULT - PROBLEM SELECTOR PROBLEM 4
Obstructive sleep apnea

## 2025-02-06 NOTE — PROGRESS NOTE ADULT - PROBLEM SELECTOR PLAN 3
-Per neuro.
-Per neuro.
-Per neuro.  on AED
-Per neuro.
-Per neuro.  on AED
-Per neuro.  on AED
-Per neuro.
-Per neuro.  on AED
-Per neuro.

## 2025-02-06 NOTE — PROGRESS NOTE ADULT - SUBJECTIVE AND OBJECTIVE BOX
[facility] HEMATOLOGY/ONCOLOGY INPATIENT PROGRESS NOTE     Interval Hx:   02-06-25: Mr. Gr was seen at bedside today.    Meds:   MEDICATIONS  (STANDING):  albuterol/ipratropium for Nebulization 3 milliLiter(s) Nebulizer every 6 hours  atorvastatin 20 milliGRAM(s) Oral at bedtime  bacitracin   Ointment 1 Application(s) Topical two times a day  chlorhexidine 2% Cloths 1 Application(s) Topical <User Schedule>  chlorhexidine 2% Cloths 1 Application(s) Topical <User Schedule>  escitalopram 15 milliGRAM(s) Oral daily  gabapentin Solution 150 milliGRAM(s) Oral two times a day  heparin   Injectable 7500 Unit(s) SubCutaneous every 8 hours  influenza  Vaccine (HIGH DOSE) 0.5 milliLiter(s) IntraMuscular once  lacosamide IVPB 100 milliGRAM(s) IV Intermittent every 12 hours  lamoTRIgine 100 milliGRAM(s) Oral two times a day  levETIRAcetam  IVPB 750 milliGRAM(s) IV Intermittent two times a day  lidocaine   4% Patch 1 Patch Transdermal daily  lurasidone 40 milliGRAM(s) Oral daily  pantoprazole  Injectable 40 milliGRAM(s) IV Push daily  polyethylene glycol 3350 17 Gram(s) Oral daily  senna 2 Tablet(s) Oral at bedtime  sevelamer carbonate 800 milliGRAM(s) Oral three times a day  valACYclovir 1000 milliGRAM(s) Oral every 12 hours    MEDICATIONS  (PRN):  acetaminophen     Tablet .. 650 milliGRAM(s) Oral every 6 hours PRN Mild Pain (1 - 3)  nystatin Powder 1 Application(s) Topical three times a day PRN fungal infection you may see and want to treat    Vital Signs Last 24 Hrs  T(C): 36.7 (05 Feb 2025 22:58), Max: 36.8 (05 Feb 2025 12:10)  T(F): 98.1 (05 Feb 2025 22:58), Max: 98.3 (05 Feb 2025 12:10)  HR: 79 (05 Feb 2025 22:58) (79 - 98)  BP: 129/73 (05 Feb 2025 22:58) (129/73 - 144/88)  BP(mean): --  RR: 18 (05 Feb 2025 22:58) (18 - 18)  SpO2: 97% (05 Feb 2025 22:58) (95% - 97%)    Parameters below as of 05 Feb 2025 22:58  Patient On (Oxygen Delivery Method): nasal cannula  O2 Flow (L/min): 3    Physical Exam:  Gen: NAD  HEENT: EOMI, MMM  Chest: equal chest rise  Cardiac: regular   Abd: non distended   Neuro: AAOx2    Labs:                        8.5    7.09  )-----------( 192      ( 04 Feb 2025 06:35 )             27.4     CBC Full  -  ( 04 Feb 2025 06:35 )  WBC Count : 7.09 K/uL  RBC Count : 2.69 M/uL  Hemoglobin : 8.5 g/dL  Hematocrit : 27.4 %  Platelet Count - Automated : 192 K/uL  Mean Cell Volume : 101.9 fl  Mean Cell Hemoglobin : 31.6 pg  Mean Cell Hemoglobin Concentration : 31.0 g/dL    02-04    142  |  110[H]  |  61[H]  ----------------------------<  91  5.1   |  19[L]  |  2.30[H]    Ca    9.8      04 Feb 2025 06:34  Phos  3.7     02-05         [facility] HEMATOLOGY/ONCOLOGY INPATIENT PROGRESS NOTE     Interval Hx:   02-06-25: Mr. Gr was seen at bedside today, no overnight events noted, did not require bipap overnight     Meds:   MEDICATIONS  (STANDING):  albuterol/ipratropium for Nebulization 3 milliLiter(s) Nebulizer every 6 hours  atorvastatin 20 milliGRAM(s) Oral at bedtime  bacitracin   Ointment 1 Application(s) Topical two times a day  chlorhexidine 2% Cloths 1 Application(s) Topical <User Schedule>  chlorhexidine 2% Cloths 1 Application(s) Topical <User Schedule>  escitalopram 15 milliGRAM(s) Oral daily  gabapentin Solution 150 milliGRAM(s) Oral two times a day  heparin   Injectable 7500 Unit(s) SubCutaneous every 8 hours  influenza  Vaccine (HIGH DOSE) 0.5 milliLiter(s) IntraMuscular once  lacosamide IVPB 100 milliGRAM(s) IV Intermittent every 12 hours  lamoTRIgine 100 milliGRAM(s) Oral two times a day  levETIRAcetam  IVPB 750 milliGRAM(s) IV Intermittent two times a day  lidocaine   4% Patch 1 Patch Transdermal daily  lurasidone 40 milliGRAM(s) Oral daily  pantoprazole  Injectable 40 milliGRAM(s) IV Push daily  polyethylene glycol 3350 17 Gram(s) Oral daily  senna 2 Tablet(s) Oral at bedtime  sevelamer carbonate 800 milliGRAM(s) Oral three times a day  valACYclovir 1000 milliGRAM(s) Oral every 12 hours    MEDICATIONS  (PRN):  acetaminophen     Tablet .. 650 milliGRAM(s) Oral every 6 hours PRN Mild Pain (1 - 3)  nystatin Powder 1 Application(s) Topical three times a day PRN fungal infection you may see and want to treat    Vital Signs Last 24 Hrs  T(C): 36.7 (05 Feb 2025 22:58), Max: 36.8 (05 Feb 2025 12:10)  T(F): 98.1 (05 Feb 2025 22:58), Max: 98.3 (05 Feb 2025 12:10)  HR: 79 (05 Feb 2025 22:58) (79 - 98)  BP: 129/73 (05 Feb 2025 22:58) (129/73 - 144/88)  BP(mean): --  RR: 18 (05 Feb 2025 22:58) (18 - 18)  SpO2: 97% (05 Feb 2025 22:58) (95% - 97%)    Parameters below as of 05 Feb 2025 22:58  Patient On (Oxygen Delivery Method): nasal cannula  O2 Flow (L/min): 3    Physical Exam:  Gen: NAD  HEENT: EOMI, MMM  Chest: equal chest rise  Cardiac: regular   Abd: non distended   Neuro: AAOx2    Labs:                        8.5    7.09  )-----------( 192      ( 04 Feb 2025 06:35 )             27.4     CBC Full  -  ( 04 Feb 2025 06:35 )  WBC Count : 7.09 K/uL  RBC Count : 2.69 M/uL  Hemoglobin : 8.5 g/dL  Hematocrit : 27.4 %  Platelet Count - Automated : 192 K/uL  Mean Cell Volume : 101.9 fl  Mean Cell Hemoglobin : 31.6 pg  Mean Cell Hemoglobin Concentration : 31.0 g/dL    02-04    142  |  110[H]  |  61[H]  ----------------------------<  91  5.1   |  19[L]  |  2.30[H]    Ca    9.8      04 Feb 2025 06:34  Phos  3.7     02-05

## 2025-02-06 NOTE — PROGRESS NOTE ADULT - PROBLEM SELECTOR PROBLEM 5
Testicular cancer

## 2025-02-06 NOTE — PROGRESS NOTE ADULT - SUBJECTIVE AND OBJECTIVE BOX
[facility]  JACINTO Horton Y. Patel, S. Shah, G. Casimir  [phone]  ([phone] - weekdays after 5pm and weekends)    Name: OSCAR MILAN  Age/Gender: 69y Male  MRN: [id number]    Interval History:  Patient seen and examined this morning.   Resting comfortably.   Notes reviewed  No concerning overnight events  Afebrile   Allergies: penicillin (Hives)  seasonal allergies (Unknown)      Objective:  Vitals:   T(F): 98.2 (02-06-25 @ 08:05), Max: 98.3 (02-05-25 @ 12:10)  HR: 82 (02-06-25 @ 08:05) (79 - 98)  BP: 141/81 (02-06-25 @ 08:05) (129/73 - 182/98)  RR: 18 (02-06-25 @ 08:05) (18 - 18)  SpO2: 100% (02-06-25 @ 08:05) (95% - 100%)  Physical Examination:  General: no acute distress, NC  HEENT: NC/AT, lip lesions healing  Respiratory: no acc muscle use, breathing comfortably  Cardiovascular: S1 and S2 present  Gastrointestinal: obese, nondistended  Extremities: no edema, no cyanosis  Skin: no visible rash    Laboratory Studies:  CBC:   WBC Trend:  7.09 02-04-25 @ 06:35  7.04 02-03-25 @ 07:21  7.50 02-01-25 @ 06:51  6.09 01-31-25 @ 06:59    CMP: 02-06    139  |  106  |  47[H]  ----------------------------<  98  4.6   |  22  |  2.38[H]    Ca    9.9      06 Feb 2025 10:35  Phos  3.7     02-05    TPro  6.7  /  Alb  3.6  /  TBili  0.4  /  DBili  x   /  AST  58[H]  /  ALT  51[H]  /  AlkPhos  166[H]  02-06    Creatinine: 2.38 mg/dL (02-06-25 @ 10:35)  Creatinine: 2.30 mg/dL (02-04-25 @ 06:34)  Creatinine: 2.12 mg/dL (02-03-25 @ 07:19)  Creatinine: 2.52 mg/dL (02-02-25 @ 12:37)  Creatinine: 2.96 mg/dL (02-01-25 @ 06:51)  Creatinine: 3.46 mg/dL (01-31-25 @ 06:59)    LIVER FUNCTIONS - ( 06 Feb 2025 10:35 )  Alb: 3.6 g/dL / Pro: 6.7 g/dL / ALK PHOS: 166 U/L / ALT: 51 U/L / AST: 58 U/L / GGT: x           Microbiology: reviewed   Culture - Sputum (collected 01-25-25 @ 07:21)  Source: .Sputum Sputum  Gram Stain (01-26-25 @ 23:12):    Numerous polymorphonuclear leukocytes per low power field    Rare Squamous epithelial cells per low power field    No organisms seen  Final Report (01-27-25 @ 17:11):    Rare Pseudomonas aeruginosa    Commensal lucia consistent with body site  Organism: Pseudomonas aeruginosa (01-27-25 @ 17:11)  Organism: Pseudomonas aeruginosa (01-27-25 @ 17:11)      Method Type: MARIELENA      -  Aztreonam: S <=4      -  Cefepime: S <=2      -  Ceftazidime: S 4      -  Ciprofloxacin: S <=0.25      -  Imipenem: S 2      -  Levofloxacin: S <=0.5      -  Meropenem: S <=1      -  Piperacillin/Tazobactam: S <=8    Culture - Urine (collected 01-24-25 @ 17:16)  Source: Catheterized Catheterized  Final Report (01-26-25 @ 02:35):    No growth    Culture - Blood (collected 01-23-25 @ 15:14)  Source: .Blood BLOOD  Final Report (01-28-25 @ 20:00):    No growth at 5 days    Culture - Blood (collected 01-23-25 @ 15:14)  Source: .Blood BLOOD  Final Report (01-28-25 @ 20:00):    No growth at 5 days    Culture - Blood (collected 01-23-25 @ 12:21)  Source: .Blood BLOOD  Final Report (01-28-25 @ 17:00):    No growth at 5 days    Culture - Blood (collected 01-23-25 @ 12:21)  Source: .Blood BLOOD  Final Report (01-28-25 @ 17:00):    No growth at 5 days        02-04-25 @ 23:41 SARS-CoV-2 NotDetec/Influenza A NotDetec/Influenza B NotDetec/RSV NotDetec    Radiology: reviewed     Medications:  acetaminophen     Tablet .. 650 milliGRAM(s) Oral every 6 hours PRN  albuterol/ipratropium for Nebulization 3 milliLiter(s) Nebulizer every 6 hours  atorvastatin 20 milliGRAM(s) Oral at bedtime  bacitracin   Ointment 1 Application(s) Topical two times a day  chlorhexidine 2% Cloths 1 Application(s) Topical <User Schedule>  chlorhexidine 2% Cloths 1 Application(s) Topical <User Schedule>  escitalopram 15 milliGRAM(s) Oral daily  gabapentin Solution 150 milliGRAM(s) Oral two times a day  heparin   Injectable 7500 Unit(s) SubCutaneous every 8 hours  influenza  Vaccine (HIGH DOSE) 0.5 milliLiter(s) IntraMuscular once  lacosamide IVPB 100 milliGRAM(s) IV Intermittent every 12 hours  lamoTRIgine 100 milliGRAM(s) Oral two times a day  levETIRAcetam  IVPB 750 milliGRAM(s) IV Intermittent two times a day  lidocaine   4% Patch 1 Patch Transdermal daily  lurasidone 40 milliGRAM(s) Oral daily  nystatin Powder 1 Application(s) Topical three times a day PRN  pantoprazole  Injectable 40 milliGRAM(s) IV Push daily  polyethylene glycol 3350 17 Gram(s) Oral daily  senna 2 Tablet(s) Oral at bedtime  sevelamer carbonate 800 milliGRAM(s) Oral three times a day  valACYclovir 1000 milliGRAM(s) Oral every 12 hours    Current Antimicrobials:  valACYclovir 1000 milliGRAM(s) Oral every 12 hours    Prior/Completed Antimicrobials:  cefTRIAXone   IVPB  cefTRIAXone   IVPB  oseltamivir  piperacillin/tazobactam IVPB..  vancomycin  IVPB

## 2025-02-06 NOTE — PROGRESS NOTE ADULT - PROBLEM SELECTOR PROBLEM 2
Pneumonia
Fever
Pneumonia
Fever
Pneumonia
Fever

## 2025-02-16 ENCOUNTER — INPATIENT (INPATIENT)
Facility: HOSPITAL | Age: 70
LOS: 39 days | Discharge: NURSING, REHAB OR LONG-TERM CARE | DRG: 4 | End: 2025-03-28
Attending: INTERNAL MEDICINE | Admitting: STUDENT IN AN ORGANIZED HEALTH CARE EDUCATION/TRAINING PROGRAM
Payer: MEDICARE

## 2025-02-16 VITALS
DIASTOLIC BLOOD PRESSURE: 45 MMHG | HEIGHT: 66 IN | OXYGEN SATURATION: 100 % | RESPIRATION RATE: 20 BRPM | SYSTOLIC BLOOD PRESSURE: 90 MMHG | TEMPERATURE: 100 F | HEART RATE: 84 BPM

## 2025-02-16 DIAGNOSIS — Z98.890 OTHER SPECIFIED POSTPROCEDURAL STATES: Chronic | ICD-10-CM

## 2025-02-16 DIAGNOSIS — Z29.9 ENCOUNTER FOR PROPHYLACTIC MEASURES, UNSPECIFIED: ICD-10-CM

## 2025-02-16 DIAGNOSIS — G40.909 EPILEPSY, UNSPECIFIED, NOT INTRACTABLE, WITHOUT STATUS EPILEPTICUS: ICD-10-CM

## 2025-02-16 DIAGNOSIS — U07.1 COVID-19: ICD-10-CM

## 2025-02-16 DIAGNOSIS — N17.9 ACUTE KIDNEY FAILURE, UNSPECIFIED: ICD-10-CM

## 2025-02-16 DIAGNOSIS — J96.21 ACUTE AND CHRONIC RESPIRATORY FAILURE WITH HYPOXIA: ICD-10-CM

## 2025-02-16 DIAGNOSIS — J96.01 ACUTE RESPIRATORY FAILURE WITH HYPOXIA: ICD-10-CM

## 2025-02-16 DIAGNOSIS — D69.6 THROMBOCYTOPENIA, UNSPECIFIED: ICD-10-CM

## 2025-02-16 LAB
ALBUMIN SERPL ELPH-MCNC: 3.2 G/DL — LOW (ref 3.3–5)
ALP SERPL-CCNC: 142 U/L — HIGH (ref 40–120)
ALT FLD-CCNC: 48 U/L — HIGH (ref 10–45)
ANION GAP SERPL CALC-SCNC: 13 MMOL/L — SIGNIFICANT CHANGE UP (ref 5–17)
APPEARANCE UR: CLEAR — SIGNIFICANT CHANGE UP
APTT BLD: 30.3 SEC — SIGNIFICANT CHANGE UP (ref 24.5–35.6)
AST SERPL-CCNC: 70 U/L — HIGH (ref 10–40)
BACTERIA # UR AUTO: NEGATIVE /HPF — SIGNIFICANT CHANGE UP
BASOPHILS # BLD AUTO: 0 K/UL — SIGNIFICANT CHANGE UP (ref 0–0.2)
BASOPHILS NFR BLD AUTO: 0 % — SIGNIFICANT CHANGE UP (ref 0–2)
BILIRUB SERPL-MCNC: 0.4 MG/DL — SIGNIFICANT CHANGE UP (ref 0.2–1.2)
BILIRUB UR-MCNC: NEGATIVE — SIGNIFICANT CHANGE UP
BUN SERPL-MCNC: 45 MG/DL — HIGH (ref 7–23)
CALCIUM SERPL-MCNC: 8.9 MG/DL — SIGNIFICANT CHANGE UP (ref 8.4–10.5)
CAST: 2 /LPF — SIGNIFICANT CHANGE UP (ref 0–4)
CHLORIDE SERPL-SCNC: 108 MMOL/L — SIGNIFICANT CHANGE UP (ref 96–108)
CO2 SERPL-SCNC: 23 MMOL/L — SIGNIFICANT CHANGE UP (ref 22–31)
COLOR SPEC: YELLOW — SIGNIFICANT CHANGE UP
CREAT ?TM UR-MCNC: 159 MG/DL — SIGNIFICANT CHANGE UP
CREAT SERPL-MCNC: 3.27 MG/DL — HIGH (ref 0.5–1.3)
DIFF PNL FLD: NEGATIVE — SIGNIFICANT CHANGE UP
EGFR: 20 ML/MIN/1.73M2 — LOW
EGFR: 20 ML/MIN/1.73M2 — LOW
EOSINOPHIL # BLD AUTO: 0 K/UL — SIGNIFICANT CHANGE UP (ref 0–0.5)
EOSINOPHIL NFR BLD AUTO: 0 % — SIGNIFICANT CHANGE UP (ref 0–6)
FLUAV AG NPH QL: SIGNIFICANT CHANGE UP
FLUBV AG NPH QL: SIGNIFICANT CHANGE UP
GAS PNL BLDV: SIGNIFICANT CHANGE UP
GAS PNL BLDV: SIGNIFICANT CHANGE UP
GIANT PLATELETS BLD QL SMEAR: PRESENT — SIGNIFICANT CHANGE UP
GLUCOSE BLDC GLUCOMTR-MCNC: 180 MG/DL — HIGH (ref 70–99)
GLUCOSE SERPL-MCNC: 120 MG/DL — HIGH (ref 70–99)
GLUCOSE UR QL: NEGATIVE MG/DL — SIGNIFICANT CHANGE UP
HCT VFR BLD CALC: 29 % — LOW (ref 39–50)
HGB BLD-MCNC: 9.1 G/DL — LOW (ref 13–17)
INR BLD: 0.98 RATIO — SIGNIFICANT CHANGE UP (ref 0.85–1.16)
KETONES UR-MCNC: NEGATIVE MG/DL — SIGNIFICANT CHANGE UP
LEUKOCYTE ESTERASE UR-ACNC: NEGATIVE — SIGNIFICANT CHANGE UP
LYMPHOCYTES # BLD AUTO: 0.66 K/UL — LOW (ref 1–3.3)
LYMPHOCYTES # BLD AUTO: 9.6 % — LOW (ref 13–44)
MAGNESIUM SERPL-MCNC: 2.1 MG/DL — SIGNIFICANT CHANGE UP (ref 1.6–2.6)
MANUAL SMEAR VERIFICATION: SIGNIFICANT CHANGE UP
MCHC RBC-ENTMCNC: 31.3 PG — SIGNIFICANT CHANGE UP (ref 27–34)
MCHC RBC-ENTMCNC: 31.4 G/DL — LOW (ref 32–36)
MCV RBC AUTO: 99.7 FL — SIGNIFICANT CHANGE UP (ref 80–100)
MONOCYTES # BLD AUTO: 0.3 K/UL — SIGNIFICANT CHANGE UP (ref 0–0.9)
MONOCYTES NFR BLD AUTO: 4.4 % — SIGNIFICANT CHANGE UP (ref 2–14)
NEUTROPHILS # BLD AUTO: 5.87 K/UL — SIGNIFICANT CHANGE UP (ref 1.8–7.4)
NEUTROPHILS NFR BLD AUTO: 82.5 % — HIGH (ref 43–77)
NEUTS BAND # BLD: 3.5 % — SIGNIFICANT CHANGE UP (ref 0–8)
NEUTS BAND NFR BLD: 3.5 % — SIGNIFICANT CHANGE UP (ref 0–8)
NITRITE UR-MCNC: NEGATIVE — SIGNIFICANT CHANGE UP
NT-PROBNP SERPL-SCNC: 4870 PG/ML — HIGH (ref 0–300)
PH UR: 5.5 — SIGNIFICANT CHANGE UP (ref 5–8)
PLAT MORPH BLD: NORMAL — SIGNIFICANT CHANGE UP
PLATELET # BLD AUTO: 87 K/UL — LOW (ref 150–400)
POTASSIUM SERPL-MCNC: 4.9 MMOL/L — SIGNIFICANT CHANGE UP (ref 3.5–5.3)
POTASSIUM SERPL-SCNC: 4.9 MMOL/L — SIGNIFICANT CHANGE UP (ref 3.5–5.3)
PROT SERPL-MCNC: 6.2 G/DL — SIGNIFICANT CHANGE UP (ref 6–8.3)
PROT UR-MCNC: 300 MG/DL
PROTHROM AB SERPL-ACNC: 11.2 SEC — SIGNIFICANT CHANGE UP (ref 9.9–13.4)
RBC # BLD: 2.91 M/UL — LOW (ref 4.2–5.8)
RBC # FLD: 15.3 % — HIGH (ref 10.3–14.5)
RBC BLD AUTO: SIGNIFICANT CHANGE UP
RBC CASTS # UR COMP ASSIST: 0 /HPF — SIGNIFICANT CHANGE UP (ref 0–4)
RSV RNA NPH QL NAA+NON-PROBE: SIGNIFICANT CHANGE UP
SARS-COV-2 RNA SPEC QL NAA+PROBE: DETECTED
SODIUM SERPL-SCNC: 144 MMOL/L — SIGNIFICANT CHANGE UP (ref 135–145)
SP GR SPEC: 1.02 — SIGNIFICANT CHANGE UP (ref 1–1.03)
SQUAMOUS # UR AUTO: 2 /HPF — SIGNIFICANT CHANGE UP (ref 0–5)
TROPONIN T, HIGH SENSITIVITY RESULT: 80 NG/L — HIGH (ref 0–51)
TROPONIN T, HIGH SENSITIVITY RESULT: 81 NG/L — HIGH (ref 0–51)
UROBILINOGEN FLD QL: 0.2 MG/DL — SIGNIFICANT CHANGE UP (ref 0.2–1)
WBC # BLD: 6.83 K/UL — SIGNIFICANT CHANGE UP (ref 3.8–10.5)
WBC # FLD AUTO: 6.83 K/UL — SIGNIFICANT CHANGE UP (ref 3.8–10.5)
WBC UR QL: 1 /HPF — SIGNIFICANT CHANGE UP (ref 0–5)

## 2025-02-16 PROCEDURE — 82803 BLOOD GASES ANY COMBINATION: CPT

## 2025-02-16 PROCEDURE — 85027 COMPLETE CBC AUTOMATED: CPT

## 2025-02-16 PROCEDURE — 87637 SARSCOV2&INF A&B&RSV AMP PRB: CPT

## 2025-02-16 PROCEDURE — 82105 ALPHA-FETOPROTEIN SERUM: CPT

## 2025-02-16 PROCEDURE — 87641 MR-STAPH DNA AMP PROBE: CPT

## 2025-02-16 PROCEDURE — 84443 ASSAY THYROID STIM HORMONE: CPT

## 2025-02-16 PROCEDURE — 97110 THERAPEUTIC EXERCISES: CPT

## 2025-02-16 PROCEDURE — 83615 LACTATE (LD) (LDH) ENZYME: CPT

## 2025-02-16 PROCEDURE — 81001 URINALYSIS AUTO W/SCOPE: CPT

## 2025-02-16 PROCEDURE — 86901 BLOOD TYPING SEROLOGIC RH(D): CPT

## 2025-02-16 PROCEDURE — 84100 ASSAY OF PHOSPHORUS: CPT

## 2025-02-16 PROCEDURE — 82947 ASSAY GLUCOSE BLOOD QUANT: CPT

## 2025-02-16 PROCEDURE — 87040 BLOOD CULTURE FOR BACTERIA: CPT

## 2025-02-16 PROCEDURE — 71250 CT THORAX DX C-: CPT | Mod: MC

## 2025-02-16 PROCEDURE — 72148 MRI LUMBAR SPINE W/O DYE: CPT | Mod: MC

## 2025-02-16 PROCEDURE — 82553 CREATINE MB FRACTION: CPT

## 2025-02-16 PROCEDURE — 71045 X-RAY EXAM CHEST 1 VIEW: CPT

## 2025-02-16 PROCEDURE — C9254: CPT

## 2025-02-16 PROCEDURE — 86900 BLOOD TYPING SEROLOGIC ABO: CPT

## 2025-02-16 PROCEDURE — 86860 RBC ANTIBODY ELUTION: CPT

## 2025-02-16 PROCEDURE — 94002 VENT MGMT INPAT INIT DAY: CPT

## 2025-02-16 PROCEDURE — 72125 CT NECK SPINE W/O DYE: CPT | Mod: MC

## 2025-02-16 PROCEDURE — 70486 CT MAXILLOFACIAL W/O DYE: CPT | Mod: MC

## 2025-02-16 PROCEDURE — 95711 VEEG 2-12 HR UNMONITORED: CPT

## 2025-02-16 PROCEDURE — 95700 EEG CONT REC W/VID EEG TECH: CPT

## 2025-02-16 PROCEDURE — 94660 CPAP INITIATION&MGMT: CPT

## 2025-02-16 PROCEDURE — 76536 US EXAM OF HEAD AND NECK: CPT

## 2025-02-16 PROCEDURE — 99291 CRITICAL CARE FIRST HOUR: CPT

## 2025-02-16 PROCEDURE — 80053 COMPREHEN METABOLIC PANEL: CPT

## 2025-02-16 PROCEDURE — 76377 3D RENDER W/INTRP POSTPROCES: CPT

## 2025-02-16 PROCEDURE — 87205 SMEAR GRAM STAIN: CPT

## 2025-02-16 PROCEDURE — 87186 SC STD MICRODIL/AGAR DIL: CPT

## 2025-02-16 PROCEDURE — 97116 GAIT TRAINING THERAPY: CPT

## 2025-02-16 PROCEDURE — 36600 WITHDRAWAL OF ARTERIAL BLOOD: CPT

## 2025-02-16 PROCEDURE — 70553 MRI BRAIN STEM W/O & W/DYE: CPT | Mod: MC

## 2025-02-16 PROCEDURE — 92612 ENDOSCOPY SWALLOW (FEES) VID: CPT

## 2025-02-16 PROCEDURE — 86880 COOMBS TEST DIRECT: CPT

## 2025-02-16 PROCEDURE — 84480 ASSAY TRIIODOTHYRONINE (T3): CPT

## 2025-02-16 PROCEDURE — 97165 OT EVAL LOW COMPLEX 30 MIN: CPT

## 2025-02-16 PROCEDURE — 70450 CT HEAD/BRAIN W/O DYE: CPT | Mod: MC

## 2025-02-16 PROCEDURE — 82435 ASSAY OF BLOOD CHLORIDE: CPT

## 2025-02-16 PROCEDURE — 97112 NEUROMUSCULAR REEDUCATION: CPT

## 2025-02-16 PROCEDURE — 85610 PROTHROMBIN TIME: CPT

## 2025-02-16 PROCEDURE — 94003 VENT MGMT INPAT SUBQ DAY: CPT

## 2025-02-16 PROCEDURE — 85025 COMPLETE CBC W/AUTO DIFF WBC: CPT

## 2025-02-16 PROCEDURE — 85018 HEMOGLOBIN: CPT

## 2025-02-16 PROCEDURE — 84704 HCG FREE BETACHAIN TEST: CPT

## 2025-02-16 PROCEDURE — 93010 ELECTROCARDIOGRAM REPORT: CPT

## 2025-02-16 PROCEDURE — 86922 COMPATIBILITY TEST ANTIGLOB: CPT

## 2025-02-16 PROCEDURE — 87640 STAPH A DNA AMP PROBE: CPT

## 2025-02-16 PROCEDURE — 87899 AGENT NOS ASSAY W/OPTIC: CPT

## 2025-02-16 PROCEDURE — 95714 VEEG EA 12-26 HR UNMNTR: CPT

## 2025-02-16 PROCEDURE — 99285 EMERGENCY DEPT VISIT HI MDM: CPT | Mod: 25

## 2025-02-16 PROCEDURE — 80048 BASIC METABOLIC PNL TOTAL CA: CPT

## 2025-02-16 PROCEDURE — 84145 PROCALCITONIN (PCT): CPT

## 2025-02-16 PROCEDURE — 82962 GLUCOSE BLOOD TEST: CPT

## 2025-02-16 PROCEDURE — 84436 ASSAY OF TOTAL THYROXINE: CPT

## 2025-02-16 PROCEDURE — 86850 RBC ANTIBODY SCREEN: CPT

## 2025-02-16 PROCEDURE — 94640 AIRWAY INHALATION TREATMENT: CPT

## 2025-02-16 PROCEDURE — 86870 RBC ANTIBODY IDENTIFICATION: CPT

## 2025-02-16 PROCEDURE — 87798 DETECT AGENT NOS DNA AMP: CPT

## 2025-02-16 PROCEDURE — 72131 CT LUMBAR SPINE W/O DYE: CPT | Mod: MC

## 2025-02-16 PROCEDURE — 70551 MRI BRAIN STEM W/O DYE: CPT | Mod: MC

## 2025-02-16 PROCEDURE — 70543 MRI ORBT/FAC/NCK W/O &W/DYE: CPT | Mod: MC

## 2025-02-16 PROCEDURE — 93970 EXTREMITY STUDY: CPT

## 2025-02-16 PROCEDURE — 92526 ORAL FUNCTION THERAPY: CPT

## 2025-02-16 PROCEDURE — 87070 CULTURE OTHR SPECIMN AEROBIC: CPT

## 2025-02-16 PROCEDURE — 85730 THROMBOPLASTIN TIME PARTIAL: CPT

## 2025-02-16 PROCEDURE — 83605 ASSAY OF LACTIC ACID: CPT

## 2025-02-16 PROCEDURE — A9585: CPT

## 2025-02-16 PROCEDURE — 97162 PT EVAL MOD COMPLEX 30 MIN: CPT

## 2025-02-16 PROCEDURE — 80177 DRUG SCRN QUAN LEVETIRACETAM: CPT

## 2025-02-16 PROCEDURE — 85014 HEMATOCRIT: CPT

## 2025-02-16 PROCEDURE — 36415 COLL VENOUS BLD VENIPUNCTURE: CPT

## 2025-02-16 PROCEDURE — C8929: CPT

## 2025-02-16 PROCEDURE — 97530 THERAPEUTIC ACTIVITIES: CPT

## 2025-02-16 PROCEDURE — 87077 CULTURE AEROBIC IDENTIFY: CPT

## 2025-02-16 PROCEDURE — 83880 ASSAY OF NATRIURETIC PEPTIDE: CPT

## 2025-02-16 PROCEDURE — 82550 ASSAY OF CK (CPK): CPT

## 2025-02-16 PROCEDURE — 82330 ASSAY OF CALCIUM: CPT

## 2025-02-16 PROCEDURE — 87529 HSV DNA AMP PROBE: CPT

## 2025-02-16 PROCEDURE — 93005 ELECTROCARDIOGRAM TRACING: CPT

## 2025-02-16 PROCEDURE — 72128 CT CHEST SPINE W/O DYE: CPT | Mod: MC

## 2025-02-16 PROCEDURE — 97164 PT RE-EVAL EST PLAN CARE: CPT

## 2025-02-16 PROCEDURE — 99223 1ST HOSP IP/OBS HIGH 75: CPT

## 2025-02-16 PROCEDURE — 71045 X-RAY EXAM CHEST 1 VIEW: CPT | Mod: 26

## 2025-02-16 PROCEDURE — 84132 ASSAY OF SERUM POTASSIUM: CPT

## 2025-02-16 PROCEDURE — 84550 ASSAY OF BLOOD/URIC ACID: CPT

## 2025-02-16 PROCEDURE — 87449 NOS EACH ORGANISM AG IA: CPT

## 2025-02-16 PROCEDURE — 84295 ASSAY OF SERUM SODIUM: CPT

## 2025-02-16 PROCEDURE — 87086 URINE CULTURE/COLONY COUNT: CPT

## 2025-02-16 PROCEDURE — 92610 EVALUATE SWALLOWING FUNCTION: CPT

## 2025-02-16 PROCEDURE — 84484 ASSAY OF TROPONIN QUANT: CPT

## 2025-02-16 PROCEDURE — 83735 ASSAY OF MAGNESIUM: CPT

## 2025-02-16 RX ORDER — ACETYLCYSTEINE 200 MG/ML
4 INHALANT RESPIRATORY (INHALATION) EVERY 4 HOURS
Refills: 0 | Status: DISCONTINUED | OUTPATIENT
Start: 2025-02-16 | End: 2025-02-17

## 2025-02-16 RX ORDER — ATORVASTATIN CALCIUM 80 MG/1
20 TABLET, FILM COATED ORAL AT BEDTIME
Refills: 0 | Status: DISCONTINUED | OUTPATIENT
Start: 2025-02-16 | End: 2025-03-04

## 2025-02-16 RX ORDER — LEVETIRACETAM 10 MG/ML
750 INJECTION, SOLUTION INTRAVENOUS
Refills: 0 | Status: DISCONTINUED | OUTPATIENT
Start: 2025-02-16 | End: 2025-02-17

## 2025-02-16 RX ORDER — LACOSAMIDE 150 MG/1
100 TABLET, FILM COATED ORAL
Refills: 0 | Status: DISCONTINUED | OUTPATIENT
Start: 2025-02-16 | End: 2025-02-17

## 2025-02-16 RX ORDER — VANCOMYCIN HCL IN 5 % DEXTROSE 1.5G/250ML
750 PLASTIC BAG, INJECTION (ML) INTRAVENOUS EVERY 24 HOURS
Refills: 0 | Status: DISCONTINUED | OUTPATIENT
Start: 2025-02-16 | End: 2025-02-18

## 2025-02-16 RX ORDER — IPRATROPIUM BROMIDE AND ALBUTEROL SULFATE .5; 2.5 MG/3ML; MG/3ML
3 SOLUTION RESPIRATORY (INHALATION)
Refills: 0 | DISCHARGE

## 2025-02-16 RX ORDER — MAGNESIUM, ALUMINUM HYDROXIDE 200-200 MG
30 TABLET,CHEWABLE ORAL EVERY 4 HOURS
Refills: 0 | Status: DISCONTINUED | OUTPATIENT
Start: 2025-02-16 | End: 2025-02-25

## 2025-02-16 RX ORDER — MELATONIN 5 MG
3 TABLET ORAL AT BEDTIME
Refills: 0 | Status: DISCONTINUED | OUTPATIENT
Start: 2025-02-16 | End: 2025-02-25

## 2025-02-16 RX ORDER — PIPERACILLIN-TAZO-DEXTROSE,ISO 3.375G/5
4.5 IV SOLUTION, PIGGYBACK PREMIX FROZEN(ML) INTRAVENOUS EVERY 12 HOURS
Refills: 0 | Status: DISCONTINUED | OUTPATIENT
Start: 2025-02-16 | End: 2025-02-16

## 2025-02-16 RX ORDER — IPRATROPIUM BROMIDE AND ALBUTEROL SULFATE .5; 2.5 MG/3ML; MG/3ML
3 SOLUTION RESPIRATORY (INHALATION) EVERY 4 HOURS
Refills: 0 | Status: DISCONTINUED | OUTPATIENT
Start: 2025-02-16 | End: 2025-02-17

## 2025-02-16 RX ORDER — ACETAMINOPHEN 500 MG/5ML
1000 LIQUID (ML) ORAL EVERY 8 HOURS
Refills: 0 | Status: COMPLETED | OUTPATIENT
Start: 2025-02-16 | End: 2025-02-16

## 2025-02-16 RX ORDER — GABAPENTIN 400 MG/1
150 CAPSULE ORAL EVERY 12 HOURS
Refills: 0 | Status: DISCONTINUED | OUTPATIENT
Start: 2025-02-16 | End: 2025-03-04

## 2025-02-16 RX ORDER — METHYLPREDNISOLONE ACETATE 80 MG/ML
60 INJECTION, SUSPENSION INTRA-ARTICULAR; INTRALESIONAL; INTRAMUSCULAR; SOFT TISSUE EVERY 8 HOURS
Refills: 0 | Status: DISCONTINUED | OUTPATIENT
Start: 2025-02-16 | End: 2025-02-17

## 2025-02-16 RX ORDER — ONDANSETRON HCL/PF 4 MG/2 ML
4 VIAL (ML) INJECTION EVERY 8 HOURS
Refills: 0 | Status: DISCONTINUED | OUTPATIENT
Start: 2025-02-16 | End: 2025-02-24

## 2025-02-16 RX ORDER — LAMOTRIGINE 150 MG/1
100 TABLET ORAL
Refills: 0 | Status: DISCONTINUED | OUTPATIENT
Start: 2025-02-16 | End: 2025-02-18

## 2025-02-16 RX ORDER — LURASIDONE HYDROCHLORIDE 120 MG/1
40 TABLET, FILM COATED ORAL DAILY
Refills: 0 | Status: DISCONTINUED | OUTPATIENT
Start: 2025-02-16 | End: 2025-02-16

## 2025-02-16 RX ORDER — METHYLPREDNISOLONE ACETATE 80 MG/ML
60 INJECTION, SUSPENSION INTRA-ARTICULAR; INTRALESIONAL; INTRAMUSCULAR; SOFT TISSUE EVERY 8 HOURS
Refills: 0 | Status: DISCONTINUED | OUTPATIENT
Start: 2025-02-16 | End: 2025-02-16

## 2025-02-16 RX ORDER — SENNA 187 MG
2 TABLET ORAL AT BEDTIME
Refills: 0 | Status: DISCONTINUED | OUTPATIENT
Start: 2025-02-16 | End: 2025-03-04

## 2025-02-16 RX ORDER — VANCOMYCIN HCL IN 5 % DEXTROSE 1.5G/250ML
1000 PLASTIC BAG, INJECTION (ML) INTRAVENOUS ONCE
Refills: 0 | Status: COMPLETED | OUTPATIENT
Start: 2025-02-16 | End: 2025-02-16

## 2025-02-16 RX ORDER — ESCITALOPRAM OXALATE 20 MG/1
15 TABLET ORAL DAILY
Refills: 0 | Status: DISCONTINUED | OUTPATIENT
Start: 2025-02-16 | End: 2025-02-16

## 2025-02-16 RX ORDER — ESCITALOPRAM OXALATE 20 MG/1
15 TABLET ORAL
Refills: 0 | Status: DISCONTINUED | OUTPATIENT
Start: 2025-02-16 | End: 2025-03-04

## 2025-02-16 RX ORDER — LURASIDONE HYDROCHLORIDE 120 MG/1
40 TABLET, FILM COATED ORAL AT BEDTIME
Refills: 0 | Status: DISCONTINUED | OUTPATIENT
Start: 2025-02-16 | End: 2025-02-27

## 2025-02-16 RX ORDER — HEPARIN SODIUM 1000 [USP'U]/ML
5000 INJECTION INTRAVENOUS; SUBCUTANEOUS EVERY 8 HOURS
Refills: 0 | Status: DISCONTINUED | OUTPATIENT
Start: 2025-02-16 | End: 2025-02-19

## 2025-02-16 RX ORDER — ACETAMINOPHEN 500 MG/5ML
650 LIQUID (ML) ORAL EVERY 6 HOURS
Refills: 0 | Status: DISCONTINUED | OUTPATIENT
Start: 2025-02-16 | End: 2025-02-25

## 2025-02-16 RX ORDER — INFLUENZA A VIRUS A/IDAHO/07/2018 (H1N1) ANTIGEN (MDCK CELL DERIVED, PROPIOLACTONE INACTIVATED, INFLUENZA A VIRUS A/INDIANA/08/2018 (H3N2) ANTIGEN (MDCK CELL DERIVED, PROPIOLACTONE INACTIVATED), INFLUENZA B VIRUS B/SINGAPORE/INFTT-16-0610/2016 ANTIGEN (MDCK CELL DERIVED, PROPIOLACTONE INACTIVATED), INFLUENZA B VIRUS B/IOWA/06/2017 ANTIGEN (MDCK CELL DERIVED, PROPIOLACTONE INACTIVATED) 15; 15; 15; 15 UG/.5ML; UG/.5ML; UG/.5ML; UG/.5ML
0.5 INJECTION, SUSPENSION INTRAMUSCULAR ONCE
Refills: 0 | Status: DISCONTINUED | OUTPATIENT
Start: 2025-02-16 | End: 2025-03-28

## 2025-02-16 RX ORDER — POLYETHYLENE GLYCOL 3350 17 G/17G
17 POWDER, FOR SOLUTION ORAL DAILY
Refills: 0 | Status: DISCONTINUED | OUTPATIENT
Start: 2025-02-16 | End: 2025-03-04

## 2025-02-16 RX ORDER — PIPERACILLIN-TAZO-DEXTROSE,ISO 3.375G/5
3.38 IV SOLUTION, PIGGYBACK PREMIX FROZEN(ML) INTRAVENOUS ONCE
Refills: 0 | Status: COMPLETED | OUTPATIENT
Start: 2025-02-16 | End: 2025-02-16

## 2025-02-16 RX ORDER — CEFEPIME 2 G/20ML
1000 INJECTION, POWDER, FOR SOLUTION INTRAVENOUS EVERY 12 HOURS
Refills: 0 | Status: DISCONTINUED | OUTPATIENT
Start: 2025-02-16 | End: 2025-02-17

## 2025-02-16 RX ORDER — ALBUTEROL SULFATE 2.5 MG/3ML
2.5 VIAL, NEBULIZER (ML) INHALATION
Refills: 0 | Status: COMPLETED | OUTPATIENT
Start: 2025-02-16 | End: 2025-02-16

## 2025-02-16 RX ADMIN — Medication 2.5 MILLIGRAM(S): at 09:32

## 2025-02-16 RX ADMIN — Medication 500 MILLILITER(S): at 14:08

## 2025-02-16 RX ADMIN — CEFEPIME 100 MILLIGRAM(S): 2 INJECTION, POWDER, FOR SOLUTION INTRAVENOUS at 17:57

## 2025-02-16 RX ADMIN — Medication 500 MILLILITER(S): at 11:02

## 2025-02-16 RX ADMIN — Medication 3.38 GRAM(S): at 12:10

## 2025-02-16 RX ADMIN — Medication 250 MILLIGRAM(S): at 10:26

## 2025-02-16 RX ADMIN — Medication 2.5 MILLIGRAM(S): at 09:39

## 2025-02-16 RX ADMIN — IPRATROPIUM BROMIDE AND ALBUTEROL SULFATE 3 MILLILITER(S): .5; 2.5 SOLUTION RESPIRATORY (INHALATION) at 14:11

## 2025-02-16 RX ADMIN — METHYLPREDNISOLONE ACETATE 60 MILLIGRAM(S): 80 INJECTION, SUSPENSION INTRA-ARTICULAR; INTRALESIONAL; INTRAMUSCULAR; SOFT TISSUE at 13:35

## 2025-02-16 RX ADMIN — Medication 200 GRAM(S): at 09:54

## 2025-02-16 RX ADMIN — Medication 1000 MILLIGRAM(S): at 14:06

## 2025-02-16 RX ADMIN — IPRATROPIUM BROMIDE AND ALBUTEROL SULFATE 3 MILLILITER(S): .5; 2.5 SOLUTION RESPIRATORY (INHALATION) at 17:46

## 2025-02-16 RX ADMIN — Medication 1000 MILLIGRAM(S): at 12:10

## 2025-02-16 RX ADMIN — Medication 400 MILLIGRAM(S): at 13:36

## 2025-02-16 RX ADMIN — Medication 2.5 MILLIGRAM(S): at 09:46

## 2025-02-16 NOTE — ED PROVIDER NOTE - CARE PLAN
1 Principal Discharge DX:	Acute hypoxic respiratory failure  Secondary Diagnosis:	2019 novel coronavirus disease (COVID-19)

## 2025-02-16 NOTE — H&P ADULT - ASSESSMENT
69 year old male presenting with acute on chronic hypoxemic respiratory failure, secondary to (a) COVID-19 infection, (b) superimposed pneumonia after recent influenza infection, and/or (c) fluid overload.

## 2025-02-16 NOTE — PATIENT PROFILE ADULT - DO YOU FEEL UNSAFE AT HOME, WORK, OR SCHOOL?
Is This A New Presentation, Or A Follow-Up?: Acne Additional Comments (Use Complete Sentences): \\nEST, not previously seen at [place] office\\nPT Presenitng with acne on face\\nPT is currently using tretinoin nightly with no irritation \\nPT pediatrician recommended PT f/u with dermatologist no

## 2025-02-16 NOTE — ED ADULT NURSE NOTE - OBJECTIVE STATEMENT
69 year old male pt presented to the ED for shortness of breath via EMS, pt with a recent Covid, states acute onset of SOB, pt with audile chest tightness and wheeze with o2 sat 70%, h/o  Epilepsy, Schizophrenia, Dev delay, recent hosp for seizures, pt denies any chest pain skin warm to touch rectal twemp of 101.9 abd soft non tedner non distended ecchymosis noted left inn thigh and abd 69 year old male pt presented to the ED for shortness of breath via EMS, pt with a recent flu in [place]  states acute onset of SOB, pt with audile chest tightness and wheeze with o2 sat 70%, h/o  Epilepsy, Schizophrenia, Dev delay, recent hosp for seizures, pt denies any chest pain skin warm to touch rectal twemp of 101.9 abd soft non tedner non distended ecchymosis noted left inn thigh and abd, pt states he ambulates with a walker

## 2025-02-16 NOTE — PATIENT PROFILE ADULT - LEGAL HELP
Outpatient Behavioral Health Progress Note    Date: 10/13/2017   Time Session Began: 10:00   Ended: 1110    Session Type: 60 Minute Therapy (37152)    Others Present:     Intervention:  Cognitive, Insight and Supportive    Patient Level of Functioning:  Deteriorated    Suicide/Homicide/Violence Ideation:  No    Change in Medication(s) Reported: No    Patient/Family Education Provided: Yes  Patient/Family Displays Understanding: Yes    Need for Community Resources Assessed: Yes  Resources Needed: No    Primary Diagnosis with Code: F41.1 Generalized anxiety disorder  (primary encounter diagnosis)  F43.23 Adjustment disorder with mixed anxiety and depressed mood    Treatment Plan: Unchanged    Discharge Plan: Strategies discussed to maintain gains    Next Appointment: As needed    Chief Complaint: \"I have had more anxiety especially related to finances and estate planning\"    Progress Note    Data:  \" The last few times that I met with my  I believe there much more anxious and have a lot of anxiety for the following 2-3 days. I believe they're worried about my investments and how much conservatively I have access to not only for my daily living needs but also if I want to plan vacations\" patient is anxious. Speech is pressured and demonstrating racing thoughts. Preoccupied thinking. Clear that she has been having increased worry nervousness and concerns especially related to her estate.    Action:  Patient does go on to articulate this in great detail. She is very clear that in terms of her investments her priority is during her quality of life time she would like to be able enjoy the monies that have been concerned and safety between her spouse. Rather than just focusing on her wrist date once she is  for her children. Time is spent brainstorming her variety of options to consider discussing with her . But to make it very clear to him that she is often very anxious when she  leaves those meetings and it should be just the opposite. Time is spent discussing the importance of being assertive and communication honest direct into the point. Also that she is a consumer and has a right to request various financial scenarios so she can look at what options best meet her needs and priorities. She is agreed. This carries over into other areas of her life the importance of being assertive and resolving conflicts immediately if possible rather than waiting for the other person to initiate.    Response:  Increased anxiety especially as it relates to estate planning and current expenditures.    Plan:  Patient will follow up with Sandip Steen MD as needed for primary care.  .   Patient will also follow up with the undersigned for psychotherapy support services.        no

## 2025-02-16 NOTE — H&P ADULT - PROBLEM SELECTOR PLAN 5
Unclear cause. Also with anemia. May be related to metastatic cancer that may have gone to bone marrow. Not present in the past.   - Monitor for improvement daily

## 2025-02-16 NOTE — H&P ADULT - TIME BILLING
Time-based billing (NON-critical care).     The necessity of the time spent during the encounter on this date of service was due to:     Time spent on review of vitals, physical exam, documentation, and discussion of plan of care with patient family.

## 2025-02-16 NOTE — ED ADULT NURSE NOTE - NSFALLRISKINTERV_ED_ALL_ED
Assistance OOB with selected safe patient handling equipment if applicable/Assistance with ambulation/Communicate fall risk and risk factors to all staff, patient, and family/Monitor gait and stability/Provide visual cue: yellow wristband, yellow gown, etc/Reinforce activity limits and safety measures with patient and family/Call bell, personal items and telephone in reach/Instruct patient to call for assistance before getting out of bed/chair/stretcher/Non-slip footwear applied when patient is off stretcher/[place] to call system/Physically safe environment - no spills, clutter or unnecessary equipment/Purposeful Proactive Rounding/Room/bathroom lighting operational, light cord in reach

## 2025-02-16 NOTE — H&P ADULT - NSHPPHYSICALEXAM_GEN_ALL_CORE
VITAL SIGNS:  T(C): 38.8 (02-16 @ 09:56), Max: 38.8 (02-16 @ 09:56)   HR: 81   BP: 131/62   RR: 30   SpO2: 100%    PHYSICAL EXAM:  CONSTITUTIONAL: non-toxic appearing, breathing comfortably on BIPAP   EYES: anicteric   HENT: normocephalic, no palpable masses in the neck  LYMPH NODES: no cervical lymphadenopathy   RESPIRATORY: diffuse rhonchi and crackles present in bilateral lung fields   CARDIOVASCULAR: regular rate and rhythm, normal S1 and S2, no murmur/rub/gallop  ABDOMEN: positive bowel sounds, non-tender, non-distended, no organomegaly   : no [place] catheter in place  MUSCULOSKELETAL:  moving all four extremities   EXTREMITIES:  no lower extremity edema  NEUROLOGY: awake, alert, oriented at least to self (unable to ask other orientation questions due to severity of illness)

## 2025-02-16 NOTE — H&P ADULT - HISTORY OF PRESENT ILLNESS
[facility] Division of Hospital Medicine  Cas Leung MD  Available via MS Teams    69 year old male with a past medical history of hypertension, hyperlipemia VAZQUEZ (on CPAP at bedtime, NC 2 liters during the day), CKD stage 3, epilepsy, schizophrenia, developmental delay, metastatic testicular cancer (s/p orchiectomy, actively on chemotherapy, last dose of etoposide/cisplatin on 6/2024) presenting with worsening shortness of breath.     Patient was recently hospitalized at [facility] from January 27th 2025 to February 6th 2025 for seizure and influenza virus infection, which required intubation. He was sent to rehab (originally from home) from hospital.     He returns due to sudden onset shortness of breath and worsening oxygenation. Of note, he tested positive for COVID-19 at facility on 2/13/25 and was not put on any COVID-19 treatment at the time, only guaifenesin and scopolamine patch. Patient was placed on non-rebreather and sent to the hospital on 2/16/25.     In the ER, vital signs significant for T-max 101.9F, HR 84-90. WBC 6.8. Hemoglobin 9.1. Platelet 87. AST 70. ALT 48. Cr 3.27. BUN 45. pH 7.30, pCO2 48. UA negative for infection. Patient given vancomycin, Zosyn, albuterol, and  cc bolus. Patient admitted to the general medicine service for further care.     Patient evaluated at bedside during admission. Unable to talk with the patient given that he is on BIPAP. Attempted to take the patient off BIPAP while in the room, but patient's work of breathing immediately began to increase and patient endorsed shortness of breath, so he was placed back on. History taken by chart review.

## 2025-02-16 NOTE — ED PROVIDER NOTE - OBJECTIVE STATEMENT
See attending note 69 male PMHx HTN HLD VAZQUEZ on CPAP CKD-III, Epilepsy, Schizophrenia, Dev delay, Met testicular ca. s/p left orchiecomty on chemo recent hosp for seizure/AHRF req intubation x2.   Patient now with shortness of breath and lethargy for the past 1 day.  Brought in by EMS who report patient saturations were 70% requiring supplemental oxygen.

## 2025-02-16 NOTE — H&P ADULT - NSICDXPASTMEDICALHX_GEN_ALL_CORE_FT
PAST MEDICAL HISTORY:  History of epilepsy     Hypertension, unspecified type     Obstructive sleep apnea     Other hyperlipidemia     Paranoid schizophrenia     Testicular cancer      Private car

## 2025-02-16 NOTE — ED ADULT NURSE REASSESSMENT NOTE - NS ED NURSE REASSESS COMMENT FT1
Message sent to Hospitalist Cas Leung regarding patients medications. PO ordered and patient is on BiPAP. Did not administer PO medications.

## 2025-02-16 NOTE — ED ADULT NURSE REASSESSMENT NOTE - NS ED NURSE REASSESS COMMENT FT1
Received patient from NEHEMIAS Damian, patient at baseline mental status, able to make needs known, NAD, patient agreeable to plan of care, pending bed assignment, comfort and safety provided.

## 2025-02-16 NOTE — ED PROVIDER NOTE - PHYSICAL EXAMINATION
see attending note PHYSICAL EXAM:  GENERAL: Appears in resp distress   HEAD:  Atraumatic, Normocephalic  EYES: EOMI, PERRLA, conjunctiva and sclera clear  ENT: No erythema/pallor/petechiae/lesions  NECK: Supple, No JVD  LUNG: BL wheeze   HEART: RRR, +S1/S2; No m/r/g  ABDOMEN: soft, NT/ND; BS audible   EXTREMITIES:  2+ Peripheral Pulses, brisk cap refill. No clubbing, cyanosis, or edema  NERVOUS SYSTEM:  AAOx3, speech clear. No sensory/motor deficits   SKIN: No rashes or lesions

## 2025-02-16 NOTE — H&P ADULT - NSHPLABSRESULTS_GEN_ALL_CORE
LABS:                        9.1    6.83  )-----------( 87       ( 2025 09:39 )             29.0     02-    144  |  108  |  45[H]  ----------------------------<  120[H]  4.9   |  23  |  3.27[H]    Ca    8.9      2025 09:39  Mg     2.1     02-    TPro  6.2  /  Alb  3.2[L]  /  TBili  0.4  /  DBili  x   /  AST  70[H]  /  ALT  48[H]  /  AlkPhos  142[H]  02-16    PT/INR - ( 2025 09:39 )   PT: 11.2 sec;   INR: 0.98 ratio         PTT - ( 2025 09:39 )  PTT:30.3 sec      Urinalysis Basic - ( 2025 11:47 )    Color: Yellow / Appearance: Clear / S.019 / pH: x  Gluc: x / Ketone: Negative mg/dL  / Bili: Negative / Urobili: 0.2 mg/dL   Blood: x / Protein: 300 mg/dL / Nitrite: Negative   Leuk Esterase: Negative / RBC: 0 /HPF / WBC 1 /HPF   Sq Epi: x / Non Sq Epi: 2 /HPF / Bacteria: Negative /HPF            RADIOLOGY & ADDITIONAL TESTS:  New Imaging Personally Reviewed Today: Chest x-ray with bilateral interstitial markings.

## 2025-02-16 NOTE — PATIENT PROFILE ADULT - FALL HARM RISK - HARM RISK INTERVENTIONS
Assistance with ambulation/Assistance OOB with selected safe patient handling equipment/Communicate Risk of Fall with Harm to all staff/Discuss with provider need for PT consult/Monitor gait and stability/Provide patient with walking aids - walker, cane, crutches/Reinforce activity limits and safety measures with patient and family/Sit up slowly, dangle for a short time, stand at bedside before walking/Tailored Fall Risk Interventions/Visual Cue: Yellow wristband and red socks/Bed in lowest position, wheels locked, appropriate side rails in place/Call bell, personal items and telephone in reach/Instruct patient to call for assistance before getting out of bed or chair/Non-slip footwear when patient is out of bed/[place] to call system/Physically safe environment - no spills, clutter or unnecessary equipment/Purposeful Proactive Rounding/Room/bathroom lighting operational, light cord in reach

## 2025-02-16 NOTE — ED PROVIDER NOTE - PROGRESS NOTE DETAILS
Eliza BOWDEN: Spoke with the nurse at [facility], confirms patient received antiepileptics–gabapentin, lamotrigine, levetiracetam, Vimpat in the morning at 9 AM.

## 2025-02-16 NOTE — H&P ADULT - PROBLEM SELECTOR PLAN 4
- Gabapentin 150 mg q12h (home med)   - Keppra 750 mg BID (home med)   - Lacosamide 100 mg BID (home med)   - Lurasidone 40 mg daily (home med)   - Lamotrigine 100 mg BID (home med)  - Escitalopram 15 mg daily (home med)

## 2025-02-16 NOTE — H&P ADULT - PROBLEM SELECTOR PLAN 1
Uses NC 2 liters at home during the day, CPAP at night for VAZQUEZ. Presents with new onset worsening oxygen needs and worsening work of breathing requiring BIPAP. Unclear cause; could be secondary to   - Zosyn 4.5 mg q12h, renally dosed  - Vancomycin 750 mg q24h, dose by trough   - Methylprednisolone 60 mg q8h for COVID-19   - Avoid remdesivir at this time given limited benefit in advanced stages of disease and SHAGUFTA and transaminitis at this time   - Attempt weaning off BIPAP to nasal canula after steroid infusion; wean oxygen needs as tolerated, goal SpO2>92%   - BIPAP at bedtime   - Obtain blood cultures x 2, MRSA swab, sputum culture, urine antigens for legionella and streptococcus pneumoniae  - Avoiding diuresis at this time due to hypotension at presentation, as well as SHAGUFTA, but will trial if methods above do not work Uses NC 2 liters at home during the day, CPAP at night for VAZQUEZ. Presents with new onset worsening oxygen needs and worsening work of breathing requiring BIPAP. Unclear cause; could be secondary to   - Cefepime 1 gram q12h, renally dosed  - Vancomycin 750 mg q24h, dose by trough   - Methylprednisolone 60 mg q8h for COVID-19   - Avoid remdesivir at this time given limited benefit in advanced stages of disease and SHAGUFTA and transaminitis at this time   - Attempt weaning off BIPAP to nasal canula after steroid infusion; wean oxygen needs as tolerated, goal SpO2>92%   - BIPAP at bedtime   - Obtain blood cultures x 2, MRSA swab, sputum culture, urine antigens for legionella and streptococcus pneumoniae  - Avoiding diuresis at this time due to hypotension at presentation, as well as SHAGUFTA, but will trial if methods above do not work

## 2025-02-16 NOTE — H&P ADULT - PROBLEM SELECTOR PLAN 6
Continue with home atorvastatin 20 mg bedtime for HLD, vitamin D supplementation, pantoprazole 40 mg daily, senna 2 tab bedtime, and Miralax 17 gram daily.     General  - DVT prophylaxis: heparin 5000 units q8h   - Diet: regular   - Medication reconciliation: complete   - Code: Full   - Medications: Tylenol 650 mg q6h as needed for pain, Maalox 30 mL q4h as needed for acid reflux, melatonin 3 mg bedtime as needed for insomnia, Zofran 4 mg IV q8h as needed for nausea/vomiting     Disposition  - Needed before discharge: off BIPAP for 24 hours   - Anticipated discharge date: acute   - Discharge to: has apartment in [place] with aide but was recently at SNF for rehab   - Appointments after discharge: PCP    Plan discussed with patient's brother Fernando (over the phone, [phone]) in detail. He agrees with plan. All questions answered. Continue with home atorvastatin 20 mg bedtime for HLD, vitamin D supplementation, pantoprazole 40 mg daily, senna 2 tab bedtime, and Miralax 17 gram daily.     General  - DVT prophylaxis: heparin 5000 units q8h   - Diet: regular   - Medication reconciliation: complete   - Code: Full   - Medications: Tylenol 650 mg q6h as needed for pain, Maalox 30 mL q4h as needed for acid reflux, melatonin 3 mg bedtime as needed for insomnia, Zofran 4 mg IV q8h as needed for nausea/vomiting     Disposition  - Needed before discharge: off BIPAP for 24 hours   - Anticipated discharge date: acute   - Discharge to: has apartment in [place] with aide but was recently at SNF for rehab   - Appointments after discharge: PCP    Plan discussed with patient's brother Fernando (over the phone, [phone]) in detail. He agrees with plan. All questions answered. He asked that I update Ester (sister, [phone]); she was called and updated as well, also agrees with the plan, also answered all questions.

## 2025-02-16 NOTE — ED PROVIDER NOTE - ATTENDING CONTRIBUTION TO CARE
I have personally performed a face to face medical and diagnostic evaluation of the patient. I have discussed with and reviewed the Resident's and/or ACP's and/or Medical/PA/NP student's note and agree with the History, ROS, Physical Exam and MDM unless otherwise indicated. A brief summary of my personal evaluation and impression can be found below.      69-year-old male past medical history metastatic testicular cancer (s/p L orchiectomy, on etoposide/cisplatin chemo, last dose 6/2024), epilepsy (grand-mal seizures), schizophrenia, developmental delay, HTN, HLD, VAZQUEZ, stage III CKD, severe obesity, secondary hyperparathyroidism, anemia, thrombocytopenia recent admission in January discharged about 10 days ago for seizures, while in the hospital patient's course was complicated by acute hypoxic respiratory failure requiring intubation x 2, patient was flu positive at that time, patient also had pneumonia for which she was treated,  presenting to the emergency department today for 1 day of worsening lethargy and shortness of breath in the setting of recent covid positive, found to be hypoxic at the facility  which improved with nonrebreather.  On ED arrival patient tachypneic with increased work of breathing, patient placed on AVAPS.  Normotensive, no tachycardia,  patient febrile on ED arrival.  Reports productive cough worsening over the past 2 days, no associated chest pain.  Denies nausea, vomiting abdominal pain or diarrhea.  No urinary symptoms.      Physical exam shows tachypnea, increased work of breathing and accessory muscle use, course breath sounds bl, rrr, abd soft/nt, not pitting edema at bl LE, distal pulses 2+ bl. Moving all extremities. Given hx and physical, ddx includes but is not limited to covid, flu viral syndrome pneumonia, sepsis, metabolic derangement. Plan for ecg, labs, abx xr ua avaps tba.    Danita Silver DO (Attending):  EKG showing normal sinus rhythm, heart rate 85, WI interval 156, QRS 68, QTc 399 no acute ischemic changes

## 2025-02-16 NOTE — H&P ADULT - PROBLEM SELECTOR PLAN 3
Has a history of CKD stage 3, Cr 2.38 at baseline. Presents with Cr 3.27. Likely pre-renal.   - Obtain renal US   - Obtain Fe Urea   - Monitor Cr daily   - Avoid nephrotoxins, dose medication to GFR

## 2025-02-17 DIAGNOSIS — N18.9 CHRONIC KIDNEY DISEASE, UNSPECIFIED: ICD-10-CM

## 2025-02-17 DIAGNOSIS — G47.33 OBSTRUCTIVE SLEEP APNEA (ADULT) (PEDIATRIC): ICD-10-CM

## 2025-02-17 LAB
ALBUMIN SERPL ELPH-MCNC: 3 G/DL — LOW (ref 3.3–5)
ALP SERPL-CCNC: 132 U/L — HIGH (ref 40–120)
ALT FLD-CCNC: 43 U/L — SIGNIFICANT CHANGE UP (ref 10–45)
ANION GAP SERPL CALC-SCNC: 13 MMOL/L — SIGNIFICANT CHANGE UP (ref 5–17)
AST SERPL-CCNC: 63 U/L — HIGH (ref 10–40)
BASE EXCESS BLDA CALC-SCNC: -5.2 MMOL/L — LOW (ref -2–3)
BASOPHILS # BLD AUTO: 0 K/UL — SIGNIFICANT CHANGE UP (ref 0–0.2)
BASOPHILS NFR BLD AUTO: 0 % — SIGNIFICANT CHANGE UP (ref 0–2)
BILIRUB SERPL-MCNC: 0.3 MG/DL — SIGNIFICANT CHANGE UP (ref 0.2–1.2)
BUN SERPL-MCNC: 53 MG/DL — HIGH (ref 7–23)
CALCIUM SERPL-MCNC: 8.9 MG/DL — SIGNIFICANT CHANGE UP (ref 8.4–10.5)
CHLORIDE SERPL-SCNC: 108 MMOL/L — SIGNIFICANT CHANGE UP (ref 96–108)
CO2 BLDA-SCNC: 21 MMOL/L — SIGNIFICANT CHANGE UP (ref 19–24)
CO2 SERPL-SCNC: 19 MMOL/L — LOW (ref 22–31)
CREAT SERPL-MCNC: 3.27 MG/DL — HIGH (ref 0.5–1.3)
EGFR: 20 ML/MIN/1.73M2 — LOW
EGFR: 20 ML/MIN/1.73M2 — LOW
EOSINOPHIL # BLD AUTO: 0 K/UL — SIGNIFICANT CHANGE UP (ref 0–0.5)
EOSINOPHIL NFR BLD AUTO: 0 % — SIGNIFICANT CHANGE UP (ref 0–6)
GLUCOSE SERPL-MCNC: 168 MG/DL — HIGH (ref 70–99)
HCO3 BLDA-SCNC: 20 MMOL/L — LOW (ref 21–28)
HCT VFR BLD CALC: 26.5 % — LOW (ref 39–50)
HGB BLD-MCNC: 8.3 G/DL — LOW (ref 13–17)
IMM GRANULOCYTES NFR BLD AUTO: 0.8 % — SIGNIFICANT CHANGE UP (ref 0–0.9)
LEGIONELLA AG UR QL: NEGATIVE — SIGNIFICANT CHANGE UP
LYMPHOCYTES # BLD AUTO: 0.29 K/UL — LOW (ref 1–3.3)
LYMPHOCYTES # BLD AUTO: 5.8 % — LOW (ref 13–44)
MCHC RBC-ENTMCNC: 31.3 G/DL — LOW (ref 32–36)
MCHC RBC-ENTMCNC: 31.7 PG — SIGNIFICANT CHANGE UP (ref 27–34)
MCV RBC AUTO: 101.1 FL — HIGH (ref 80–100)
MONOCYTES # BLD AUTO: 0.14 K/UL — SIGNIFICANT CHANGE UP (ref 0–0.9)
MONOCYTES NFR BLD AUTO: 2.8 % — SIGNIFICANT CHANGE UP (ref 2–14)
MRSA PCR RESULT.: SIGNIFICANT CHANGE UP
NEUTROPHILS # BLD AUTO: 4.5 K/UL — SIGNIFICANT CHANGE UP (ref 1.8–7.4)
NEUTROPHILS NFR BLD AUTO: 90.6 % — HIGH (ref 43–77)
NRBC BLD AUTO-RTO: 0 /100 WBCS — SIGNIFICANT CHANGE UP (ref 0–0)
PCO2 BLDA: 37 MMHG — SIGNIFICANT CHANGE UP (ref 35–48)
PH BLDA: 7.34 — LOW (ref 7.35–7.45)
PLATELET # BLD AUTO: 76 K/UL — LOW (ref 150–400)
PO2 BLDA: 157 MMHG — HIGH (ref 83–108)
POTASSIUM SERPL-MCNC: 5.3 MMOL/L — SIGNIFICANT CHANGE UP (ref 3.5–5.3)
POTASSIUM SERPL-SCNC: 5.3 MMOL/L — SIGNIFICANT CHANGE UP (ref 3.5–5.3)
PROT SERPL-MCNC: 6.1 G/DL — SIGNIFICANT CHANGE UP (ref 6–8.3)
RBC # BLD: 2.62 M/UL — LOW (ref 4.2–5.8)
RBC # FLD: 15 % — HIGH (ref 10.3–14.5)
S AUREUS DNA NOSE QL NAA+PROBE: SIGNIFICANT CHANGE UP
SAO2 % BLDA: 99.2 % — HIGH (ref 94–98)
SODIUM SERPL-SCNC: 140 MMOL/L — SIGNIFICANT CHANGE UP (ref 135–145)
UUN UR-MCNC: 655 MG/DL — SIGNIFICANT CHANGE UP
WBC # BLD: 4.97 K/UL — SIGNIFICANT CHANGE UP (ref 3.8–10.5)
WBC # FLD AUTO: 4.97 K/UL — SIGNIFICANT CHANGE UP (ref 3.8–10.5)

## 2025-02-17 PROCEDURE — 71250 CT THORAX DX C-: CPT | Mod: 26

## 2025-02-17 RX ORDER — LACOSAMIDE 150 MG/1
100 TABLET, FILM COATED ORAL EVERY 12 HOURS
Refills: 0 | Status: DISCONTINUED | OUTPATIENT
Start: 2025-02-17 | End: 2025-02-17

## 2025-02-17 RX ORDER — IPRATROPIUM BROMIDE AND ALBUTEROL SULFATE .5; 2.5 MG/3ML; MG/3ML
3 SOLUTION RESPIRATORY (INHALATION) ONCE
Refills: 0 | Status: COMPLETED | OUTPATIENT
Start: 2025-02-17 | End: 2025-02-17

## 2025-02-17 RX ORDER — IPRATROPIUM BROMIDE AND ALBUTEROL SULFATE .5; 2.5 MG/3ML; MG/3ML
3 SOLUTION RESPIRATORY (INHALATION) EVERY 6 HOURS
Refills: 0 | Status: DISCONTINUED | OUTPATIENT
Start: 2025-02-17 | End: 2025-02-28

## 2025-02-17 RX ORDER — LACOSAMIDE 150 MG/1
100 TABLET, FILM COATED ORAL ONCE
Refills: 0 | Status: DISCONTINUED | OUTPATIENT
Start: 2025-02-17 | End: 2025-02-17

## 2025-02-17 RX ORDER — PIPERACILLIN-TAZO-DEXTROSE,ISO 3.375G/5
4.5 IV SOLUTION, PIGGYBACK PREMIX FROZEN(ML) INTRAVENOUS EVERY 12 HOURS
Refills: 0 | Status: DISCONTINUED | OUTPATIENT
Start: 2025-02-17 | End: 2025-02-19

## 2025-02-17 RX ORDER — LEVETIRACETAM 10 MG/ML
750 INJECTION, SOLUTION INTRAVENOUS ONCE
Refills: 0 | Status: DISCONTINUED | OUTPATIENT
Start: 2025-02-17 | End: 2025-02-17

## 2025-02-17 RX ORDER — LACOSAMIDE 150 MG/1
100 TABLET, FILM COATED ORAL
Refills: 0 | Status: DISCONTINUED | OUTPATIENT
Start: 2025-02-17 | End: 2025-02-18

## 2025-02-17 RX ORDER — LEVETIRACETAM 10 MG/ML
750 INJECTION, SOLUTION INTRAVENOUS EVERY 12 HOURS
Refills: 0 | Status: DISCONTINUED | OUTPATIENT
Start: 2025-02-17 | End: 2025-02-17

## 2025-02-17 RX ORDER — ACETYLCYSTEINE 200 MG/ML
3 INHALANT RESPIRATORY (INHALATION) EVERY 6 HOURS
Refills: 0 | Status: COMPLETED | OUTPATIENT
Start: 2025-02-17 | End: 2025-02-20

## 2025-02-17 RX ORDER — LEVETIRACETAM 10 MG/ML
750 INJECTION, SOLUTION INTRAVENOUS
Refills: 0 | Status: DISCONTINUED | OUTPATIENT
Start: 2025-02-17 | End: 2025-02-18

## 2025-02-17 RX ORDER — DEXAMETHASONE 0.5 MG/1
6 TABLET ORAL DAILY
Refills: 0 | Status: COMPLETED | OUTPATIENT
Start: 2025-02-18 | End: 2025-02-27

## 2025-02-17 RX ADMIN — HEPARIN SODIUM 5000 UNIT(S): 1000 INJECTION INTRAVENOUS; SUBCUTANEOUS at 13:26

## 2025-02-17 RX ADMIN — Medication 25 GRAM(S): at 09:29

## 2025-02-17 RX ADMIN — ATORVASTATIN CALCIUM 20 MILLIGRAM(S): 80 TABLET, FILM COATED ORAL at 22:24

## 2025-02-17 RX ADMIN — IPRATROPIUM BROMIDE AND ALBUTEROL SULFATE 3 MILLILITER(S): .5; 2.5 SOLUTION RESPIRATORY (INHALATION) at 00:07

## 2025-02-17 RX ADMIN — HEPARIN SODIUM 5000 UNIT(S): 1000 INJECTION INTRAVENOUS; SUBCUTANEOUS at 22:23

## 2025-02-17 RX ADMIN — IPRATROPIUM BROMIDE AND ALBUTEROL SULFATE 3 MILLILITER(S): .5; 2.5 SOLUTION RESPIRATORY (INHALATION) at 23:17

## 2025-02-17 RX ADMIN — Medication 40 MILLIGRAM(S): at 08:09

## 2025-02-17 RX ADMIN — ACETYLCYSTEINE 4 MILLILITER(S): 200 INHALANT RESPIRATORY (INHALATION) at 09:30

## 2025-02-17 RX ADMIN — LACOSAMIDE 120 MILLIGRAM(S): 150 TABLET, FILM COATED ORAL at 01:09

## 2025-02-17 RX ADMIN — LEVETIRACETAM 750 MILLIGRAM(S): 10 INJECTION, SOLUTION INTRAVENOUS at 17:59

## 2025-02-17 RX ADMIN — ACETYLCYSTEINE 3 MILLILITER(S): 200 INHALANT RESPIRATORY (INHALATION) at 23:38

## 2025-02-17 RX ADMIN — IPRATROPIUM BROMIDE AND ALBUTEROL SULFATE 3 MILLILITER(S): .5; 2.5 SOLUTION RESPIRATORY (INHALATION) at 03:01

## 2025-02-17 RX ADMIN — IPRATROPIUM BROMIDE AND ALBUTEROL SULFATE 3 MILLILITER(S): .5; 2.5 SOLUTION RESPIRATORY (INHALATION) at 17:59

## 2025-02-17 RX ADMIN — LACOSAMIDE 100 MILLIGRAM(S): 150 TABLET, FILM COATED ORAL at 17:59

## 2025-02-17 RX ADMIN — LAMOTRIGINE 100 MILLIGRAM(S): 150 TABLET ORAL at 08:09

## 2025-02-17 RX ADMIN — HEPARIN SODIUM 5000 UNIT(S): 1000 INJECTION INTRAVENOUS; SUBCUTANEOUS at 06:49

## 2025-02-17 RX ADMIN — IPRATROPIUM BROMIDE AND ALBUTEROL SULFATE 3 MILLILITER(S): .5; 2.5 SOLUTION RESPIRATORY (INHALATION) at 14:42

## 2025-02-17 RX ADMIN — IPRATROPIUM BROMIDE AND ALBUTEROL SULFATE 3 MILLILITER(S): .5; 2.5 SOLUTION RESPIRATORY (INHALATION) at 09:29

## 2025-02-17 RX ADMIN — LAMOTRIGINE 100 MILLIGRAM(S): 150 TABLET ORAL at 18:00

## 2025-02-17 RX ADMIN — METHYLPREDNISOLONE ACETATE 60 MILLIGRAM(S): 80 INJECTION, SUSPENSION INTRA-ARTICULAR; INTRALESIONAL; INTRAMUSCULAR; SOFT TISSUE at 00:06

## 2025-02-17 RX ADMIN — Medication 25 GRAM(S): at 20:10

## 2025-02-17 RX ADMIN — LEVETIRACETAM 750 MILLIGRAM(S): 10 INJECTION, SOLUTION INTRAVENOUS at 01:08

## 2025-02-17 RX ADMIN — ACETYLCYSTEINE 3 MILLILITER(S): 200 INHALANT RESPIRATORY (INHALATION) at 17:59

## 2025-02-17 RX ADMIN — IPRATROPIUM BROMIDE AND ALBUTEROL SULFATE 3 MILLILITER(S): .5; 2.5 SOLUTION RESPIRATORY (INHALATION) at 06:48

## 2025-02-17 RX ADMIN — HEPARIN SODIUM 5000 UNIT(S): 1000 INJECTION INTRAVENOUS; SUBCUTANEOUS at 00:07

## 2025-02-17 RX ADMIN — Medication 1000 UNIT(S): at 13:26

## 2025-02-17 RX ADMIN — ESCITALOPRAM OXALATE 15 MILLIGRAM(S): 20 TABLET ORAL at 08:11

## 2025-02-17 RX ADMIN — LACOSAMIDE 120 MILLIGRAM(S): 150 TABLET, FILM COATED ORAL at 03:00

## 2025-02-17 RX ADMIN — METHYLPREDNISOLONE ACETATE 60 MILLIGRAM(S): 80 INJECTION, SUSPENSION INTRA-ARTICULAR; INTRALESIONAL; INTRAMUSCULAR; SOFT TISSUE at 06:49

## 2025-02-17 RX ADMIN — GABAPENTIN 150 MILLIGRAM(S): 400 CAPSULE ORAL at 09:29

## 2025-02-17 RX ADMIN — GABAPENTIN 150 MILLIGRAM(S): 400 CAPSULE ORAL at 18:01

## 2025-02-17 RX ADMIN — LURASIDONE HYDROCHLORIDE 40 MILLIGRAM(S): 120 TABLET, FILM COATED ORAL at 22:23

## 2025-02-17 RX ADMIN — Medication 250 MILLIGRAM(S): at 06:48

## 2025-02-17 NOTE — CONSULT NOTE ADULT - NS ATTEND AMEND GEN_ALL_CORE FT
MEDICATIONS  (STANDING):  acetylcysteine 20%  Inhalation 3 milliLiter(s) Inhalation every 6 hours  albuterol/ipratropium for Nebulization 3 milliLiter(s) Nebulizer every 6 hours  atorvastatin 20 milliGRAM(s) Oral at bedtime  cholecalciferol 1000 Unit(s) Oral daily  escitalopram 15 milliGRAM(s) Oral with breakfast  gabapentin Solution 150 milliGRAM(s) Oral every 12 hours  heparin   Injectable 5000 Unit(s) SubCutaneous every 8 hours  influenza  Vaccine (HIGH DOSE) 0.5 milliLiter(s) IntraMuscular once  lacosamide 100 milliGRAM(s) Oral two times a day  lamoTRIgine 100 milliGRAM(s) Oral two times a day  levETIRAcetam 750 milliGRAM(s) Oral two times a day  lurasidone 40 milliGRAM(s) Oral at bedtime  pantoprazole    Tablet 40 milliGRAM(s) Oral before breakfast  piperacillin/tazobactam IVPB.. 4.5 Gram(s) IV Intermittent every 12 hours  polyethylene glycol 3350 17 Gram(s) Oral daily  senna 2 Tablet(s) Oral at bedtime  vancomycin  IVPB 750 milliGRAM(s) IV Intermittent every 24 hours
had seen and examined the pt on day tof admission :  agreed with h&p  : agree with the treatment plan ;   keep o2 sao2 above92% all the time

## 2025-02-17 NOTE — PROGRESS NOTE ADULT - PROBLEM SELECTOR PLAN 6
Continue with home atorvastatin 20 mg bedtime for HLD, vitamin D supplementation, pantoprazole 40 mg daily, senna 2 tab bedtime, and Miralax 17 gram daily.     General  - DVT prophylaxis: heparin 5000 units q8h   - Diet: regular   - Medication reconciliation: complete   - Code: Full   - Medications: Tylenol 650 mg q6h as needed for pain, Maalox 30 mL q4h as needed for acid reflux, melatonin 3 mg bedtime as needed for insomnia, Zofran 4 mg IV q8h as needed for nausea/vomiting     Disposition  - Needed before discharge: off BIPAP for 24 hours   - Anticipated discharge date: acute   - Discharge to: has apartment in [place] with aide but was recently at SNF for rehab   - Appointments after discharge: PCP    2/16/25 --> plan was discussed with patient's brother Fernando (over the phone, [phone]) in detail. He agrees with plan. All questions answered. He asked that I update Ester (sister, [phone]); she was called and updated as well, also agrees with the plan, also answered all questions.

## 2025-02-17 NOTE — CONSULT NOTE ADULT - ASSESSMENT
69 year old male with a past medical history of hypertension, hyperlipemia VAZQUEZ (on CPAP at bedtime, NC 2 liters during the day), CKD stage 3, epilepsy, schizophrenia, developmental delay, metastatic testicular cancer (s/p orchiectomy, actively on chemotherapy, last dose of etoposide/cisplatin on 6/2024) presenting with worsening shortness of breath. Patient was recently hospitalized at [facility] from January 27th 2025 to February 6th 2025 for seizure and influenza virus infection, which required intubation. He was sent to rehab (originally from home) from hospital. He returns due to sudden onset shortness of breath and worsening oxygenation. Of note, he tested positive for COVID-19       1- SHAGUFTA on CKDIII  2- covid-19  3- CHF  4- anemia        SHAGUFTA suspected in setting of new infection.   external catheter in place, >350 cc output noted  to check bladder scan to eval for retention  U/A reviewed  check renal sono given shagufta  hold further ivf given sob  anemia, trend hgb  pulm consulted, ct chest pending  echo pending  supportive care for covid-19  strict I/O  trend creatinine and electrolytes daily 69 year old male with a past medical history of hypertension, hyperlipemia VAZQUEZ (on CPAP at bedtime, NC 2 liters during the day), CKD stage 3, epilepsy, schizophrenia, developmental delay, metastatic testicular cancer (s/p orchiectomy, actively on chemotherapy, last dose of etoposide/cisplatin on 6/2024) presenting with worsening shortness of breath. Patient was recently hospitalized at [facility] from January 27th 2025 to February 6th 2025 for seizure and influenza virus infection, which required intubation. He was sent to rehab (originally from home) from hospital. He returns due to sudden onset shortness of breath and worsening oxygenation. Of note, he tested positive for COVID-19       1- SHAGUFTA on CKDIII  2- covid-19  3- CHF  4- anemia                          8.3    4.97  )-----------( 76       ( 17 Feb 2025 05:06 )             26.5       CBC Full  -  ( 17 Feb 2025 05:06 )  WBC Count : 4.97 K/uL  RBC Count : 2.62 M/uL  Hemoglobin : 8.3 g/dL  Hematocrit : 26.5 %  Platelet Count - Automated : 76 K/uL  Mean Cell Volume : 101.1 fl  Mean Cell Hemoglobin : 31.7 pg  Mean Cell Hemoglobin Concentration : 31.3 g/dL  Auto Neutrophil # : x  Auto Lymphocyte # : x  Auto Monocyte # : x  Auto Eosinophil # : x  Auto Basophil # : x  Auto Neutrophil % : x  Auto Lymphocyte % : x  Auto Monocyte % : x  Auto Eosinophil % : x  Auto Basophil % : x      02-17    140  |  108  |  53[H]  ----------------------------<  168[H]  5.3   |  19[L]  |  3.27[H]    Ca    8.9      17 Feb 2025 05:06  Mg     2.1     02-16    TPro  6.1  /  Alb  3.0[L]  /  TBili  0.3  /  DBili  x   /  AST  63[H]  /  ALT  43  /  AlkPhos  132[H]  02-17      CAPILLARY BLOOD GLUCOSE      POCT Blood Glucose.: 180 mg/dL (16 Feb 2025 23:57)      Vital Signs Last 24 Hrs  T(C): 36.6 (17 Feb 2025 16:56), Max: 37.1 (16 Feb 2025 22:18)  T(F): 97.9 (17 Feb 2025 16:56), Max: 98.7 (16 Feb 2025 22:18)  HR: 74 (17 Feb 2025 16:56) (64 - 79)  BP: 127/81 (17 Feb 2025 16:56) (118/70 - 144/90)  BP(mean): --  RR: 18 (17 Feb 2025 16:56) (18 - 24)  SpO2: 99% (17 Feb 2025 16:56) (96% - 100%)    Parameters below as of 17 Feb 2025 16:56  Patient On (Oxygen Delivery Method): nasal cannula  O2 Flow (L/min): 4      Urinalysis Basic - ( 17 Feb 2025 05:06 )    Color: x / Appearance: x / SG: x / pH: x  Gluc: 168 mg/dL / Ketone: x  / Bili: x / Urobili: x   Blood: x / Protein: x / Nitrite: x   Leuk Esterase: x / RBC: x / WBC x   Sq Epi: x / Non Sq Epi: x / Bacteria: x        PT/INR - ( 16 Feb 2025 09:39 )   PT: 11.2 sec;   INR: 0.98 ratio         PTT - ( 16 Feb 2025 09:39 )  PTT:30.3 sec    SHAGUFTA suspected in setting of new infection.   external catheter in place, >350 cc output noted  to check bladder scan to eval for retention  U/A reviewed  check renal sono given shagufta  hold further ivf given sob  anemia, trend hgb  pulm consulted, ct chest pending  echo pending  supportive care for covid-19  strict I/O  trend creatinine and electrolytes daily

## 2025-02-17 NOTE — CONSULT NOTE ADULT - PROBLEM SELECTOR RECOMMENDATION 9
-Recent admission for acute respiratory failure in the setting of grand mal seizures, influenza, PNA. S/p intubation in MICU, was extubated to AVAPS  -Now COVID +  -CXR with low lung volumes, mild congestion. Possible underlying infiltrate  -Check CT chest non-contrast  -ABG noted, no CO2 retention. Can continue AVAPS for now (eventual trial off - pt has CPAP at home for VAZQUEZ). Keep sats >90% when off AVAPS  -Continue bronchodilators/mucolytics   -Continue ABX  -D/c Solumedrol. Start Dexamethasone 6 mg qd x 10 days.

## 2025-02-17 NOTE — CONSULT NOTE ADULT - SUBJECTIVE AND OBJECTIVE BOX
[facility]  JACINTO Horton S. Shah, Y. Patel, G. Casimir  [phone]  ([phone] - weekdays after 5pm and weekends)    OSCAR MILAN  69y, Male  [id number]    HPI:  Patient is a 69 year old male with PMH of HTN, HLD, VAZQUEZ (on CPAP at bedtime, NC 2 liters during the day), CKD3, epilepsy, schizophrenia, developmental delay, metastatic testicular cancer (s/p orchiectomy, actively on chemotherapy, last dose of etoposide/cisplatin on 6/2024) presenting with worsening shortness of breath. Patient was recently hospitalized at [facility] from January 27th 2025 to February 6th 2025 for seizure and influenza virus infection, which required intubation. He was sent to rehab (originally from home) from hospital and now returns due to sudden onset shortness of breath and worsening oxygenation. Of note, he tested positive for COVID-19 at facility on 2/13/25 and was not put on any COVID-19 treatment at the time, only guaifenesin and scopolamine patch. Patient was placed on non-rebreather and sent to the hospital on 2/16/25. In the ER, vital signs significant for T-max 101.9F, HR 84-90. WBC 6.8. Hemoglobin 9.1. Platelet 87. AST 70. ALT 48. Cr 3.27. BUN 45. pH 7.30, pCO2 48. UA negative for infection. Patient given vancomycin, Zosyn, albuterol, and  cc bolus. Patient admitted to the general medicine service for further care.   ROS: 14 point review of systems completed, pertinent positives and negatives as per HPI.    Allergies: seasonal allergies (Unknown)  penicillin (Hives)    PMH -- Hypertension, unspecified type  Other hyperlipidemia  History of epilepsy  Paranoid schizophrenia  Obstructive sleep apnea  Testicular cancer    PSH -- H/O Spinal surgery  FH -- noncontributory   Social History -- denies tobacco, alcohol or illicit drug use    Physical Exam--  Vital Signs Last 24 Hrs  T(F): 97.7 (17 Feb 2025 11:03), Max: 98.7 (16 Feb 2025 22:18)  HR: 72 (17 Feb 2025 11:03) (67 - 89)  BP: 136/82 (17 Feb 2025 11:03) (113/60 - 144/90)  RR: 20 (17 Feb 2025 11:03) (20 - 30)  SpO2: 97% (17 Feb 2025 11:03) (97% - 100%)  General: no acute distress, NC   HEENT: NC/AT, EOMI, anicteric  Lungs: decreased breath sounds b/l   Heart: S1, S2 present, normal rate  Abdomen: Soft. ND. NT. BS present.   Neuro: AAOx3, no obvious focal deficits   Extremities: No cyanosis. No edema.   Skin: Warm. Dry. No visible rash.   Lines: PIV     Laboratory & Imaging Data--  CBC:                       8.3    4.97  )-----------( 76       ( 17 Feb 2025 05:06 )             26.5     WBC Count: 4.97 K/uL (02-17-25 @ 05:06)  WBC Count: 6.83 K/uL (02-16-25 @ 09:39)    CMP: 02-17    140  |  108  |  53[H]  ----------------------------<  168[H]  5.3   |  19[L]  |  3.27[H]    Ca    8.9      17 Feb 2025 05:06  Mg     2.1     02-16    TPro  6.1  /  Alb  3.0[L]  /  TBili  0.3  /  DBili  x   /  AST  63[H]  /  ALT  43  /  AlkPhos  132[H]  02-17    LIVER FUNCTIONS - ( 17 Feb 2025 05:06 )  Alb: 3.0 g/dL / Pro: 6.1 g/dL / ALK PHOS: 132 U/L / ALT: 43 U/L / AST: 63 U/L / GGT: x           Microbiology: reviewed  02-16-25 @ 09:59  SARS-CoV-2 Detected  Influenza A NotDetec  Influenza B NotDetec  RSV NotDetec    Radiology--reviewed    < from: Xray Chest 1 View- PORTABLE-Urgent (Xray Chest 1 View- PORTABLE-Urgent .) (02.16.25 @ 11:01) >    IMPRESSION: Bilateral accentuated interstitial markings more prominent   than on prior likely congestive. Question superimposed focal infiltrate   in the right upper lobe. Correlate clinically for active infection. No   pleural effusion or pneumothorax.    --- End of Report ---    < end of copied text >    Active Medications--  acetaminophen     Tablet .. 650 milliGRAM(s) Oral every 6 hours PRN  acetylcysteine 20%  Inhalation 3 milliLiter(s) Inhalation every 6 hours  albuterol/ipratropium for Nebulization 3 milliLiter(s) Nebulizer every 6 hours  aluminum hydroxide/magnesium hydroxide/simethicone Suspension 30 milliLiter(s) Oral every 4 hours PRN  atorvastatin 20 milliGRAM(s) Oral at bedtime  cholecalciferol 1000 Unit(s) Oral daily  escitalopram 15 milliGRAM(s) Oral with breakfast  gabapentin Solution 150 milliGRAM(s) Oral every 12 hours  heparin   Injectable 5000 Unit(s) SubCutaneous every 8 hours  influenza  Vaccine (HIGH DOSE) 0.5 milliLiter(s) IntraMuscular once  lacosamide 100 milliGRAM(s) Oral two times a day  lamoTRIgine 100 milliGRAM(s) Oral two times a day  levETIRAcetam 750 milliGRAM(s) Oral two times a day  lurasidone 40 milliGRAM(s) Oral at bedtime  melatonin 3 milliGRAM(s) Oral at bedtime PRN  ondansetron Injectable 4 milliGRAM(s) IV Push every 8 hours PRN  pantoprazole    Tablet 40 milliGRAM(s) Oral before breakfast  piperacillin/tazobactam IVPB.. 4.5 Gram(s) IV Intermittent every 12 hours  polyethylene glycol 3350 17 Gram(s) Oral daily  senna 2 Tablet(s) Oral at bedtime  vancomycin  IVPB 750 milliGRAM(s) IV Intermittent every 24 hours    Current Antimicrobials:   piperacillin/tazobactam IVPB.. 4.5 Gram(s) IV Intermittent every 12 hours  vancomycin  IVPB 750 milliGRAM(s) IV Intermittent every 24 hours    Prior/Completed Antimicrobials:  piperacillin/tazobactam IVPB...  vancomycin  IVPB.    Immunologic:   influenza  Vaccine (HIGH DOSE) 0.5 milliLiter(s) IntraMuscular once     [facility]  JACINTO Horton S. Shah, Y. Patel, G. Casimir  [phone]  ([phone] - weekdays after 5pm and weekends)    OSCAR MILAN  69y, Male  [id number]    HPI:  Patient is a 69 year old male with PMH of HTN, HLD, VAZQUEZ (on CPAP at bedtime, NC 2 liters during the day), CKD3, epilepsy, schizophrenia, developmental delay, metastatic testicular cancer (s/p orchiectomy, actively on chemotherapy, last dose of etoposide/cisplatin on 6/2024) presenting with worsening shortness of breath. Patient was recently hospitalized at [facility] from January 27th 2025 to February 6th 2025 for seizure and influenza virus infection, which required intubation. He was sent to rehab (originally from home) from hospital and now returns due to sudden onset shortness of breath and worsening oxygenation. Of note, he tested positive for COVID-19 at facility on 2/13/25 and was not put on any COVID-19 treatment at the time, only guaifenesin and scopolamine patch. Patient was placed on non-rebreather and sent to the hospital on 2/16/25. In the ER, vital signs significant for T-max 101.9F, HR 84-90. WBC 6.8. Hemoglobin 9.1. Platelet 87. AST 70. ALT 48. Cr 3.27. BUN 45. pH 7.30, pCO2 48. UA negative for infection. Patient given vancomycin, Zosyn, albuterol, and  cc bolus. Patient admitted to the general medicine service for further care.   ROS: 14 point review of systems completed, pertinent positives and negatives as per HPI.    Allergies: seasonal allergies (Unknown)  penicillin (Hives)    PMH -- Hypertension, unspecified type  Other hyperlipidemia  History of epilepsy  Paranoid schizophrenia  Obstructive sleep apnea  Testicular cancer    PSH -- H/O Spinal surgery  FH -- noncontributory   Social History -- former smoker, denies alcohol or illicit drug use    Physical Exam--  Vital Signs Last 24 Hrs  T(F): 97.7 (17 Feb 2025 11:03), Max: 98.7 (16 Feb 2025 22:18)  HR: 72 (17 Feb 2025 11:03) (67 - 89)  BP: 136/82 (17 Feb 2025 11:03) (113/60 - 144/90)  RR: 20 (17 Feb 2025 11:03) (20 - 30)  SpO2: 97% (17 Feb 2025 11:03) (97% - 100%)  General: no acute distress, NC   HEENT: NC/AT, EOMI, anicteric  Lungs: decreased breath sounds b/l   Heart: S1, S2 present, normal rate  Abdomen: Soft. ND. NT. BS present.   Neuro: AAOx3, no obvious focal deficits   Extremities: No cyanosis. No edema.   Skin: Warm. Dry. No visible rash.   Lines: PIV     Laboratory & Imaging Data--  CBC:                       8.3    4.97  )-----------( 76       ( 17 Feb 2025 05:06 )             26.5     WBC Count: 4.97 K/uL (02-17-25 @ 05:06)  WBC Count: 6.83 K/uL (02-16-25 @ 09:39)    CMP: 02-17    140  |  108  |  53[H]  ----------------------------<  168[H]  5.3   |  19[L]  |  3.27[H]    Ca    8.9      17 Feb 2025 05:06  Mg     2.1     02-16    TPro  6.1  /  Alb  3.0[L]  /  TBili  0.3  /  DBili  x   /  AST  63[H]  /  ALT  43  /  AlkPhos  132[H]  02-17    LIVER FUNCTIONS - ( 17 Feb 2025 05:06 )  Alb: 3.0 g/dL / Pro: 6.1 g/dL / ALK PHOS: 132 U/L / ALT: 43 U/L / AST: 63 U/L / GGT: x           Microbiology: reviewed  02-16-25 @ 09:59  SARS-CoV-2 Detected  Influenza A NotDetec  Influenza B NotDetec  RSV NotDetec    Radiology--reviewed    < from: Xray Chest 1 View- PORTABLE-Urgent (Xray Chest 1 View- PORTABLE-Urgent .) (02.16.25 @ 11:01) >    IMPRESSION: Bilateral accentuated interstitial markings more prominent   than on prior likely congestive. Question superimposed focal infiltrate   in the right upper lobe. Correlate clinically for active infection. No   pleural effusion or pneumothorax.    --- End of Report ---    < end of copied text >    Active Medications--  acetaminophen     Tablet .. 650 milliGRAM(s) Oral every 6 hours PRN  acetylcysteine 20%  Inhalation 3 milliLiter(s) Inhalation every 6 hours  albuterol/ipratropium for Nebulization 3 milliLiter(s) Nebulizer every 6 hours  aluminum hydroxide/magnesium hydroxide/simethicone Suspension 30 milliLiter(s) Oral every 4 hours PRN  atorvastatin 20 milliGRAM(s) Oral at bedtime  cholecalciferol 1000 Unit(s) Oral daily  escitalopram 15 milliGRAM(s) Oral with breakfast  gabapentin Solution 150 milliGRAM(s) Oral every 12 hours  heparin   Injectable 5000 Unit(s) SubCutaneous every 8 hours  influenza  Vaccine (HIGH DOSE) 0.5 milliLiter(s) IntraMuscular once  lacosamide 100 milliGRAM(s) Oral two times a day  lamoTRIgine 100 milliGRAM(s) Oral two times a day  levETIRAcetam 750 milliGRAM(s) Oral two times a day  lurasidone 40 milliGRAM(s) Oral at bedtime  melatonin 3 milliGRAM(s) Oral at bedtime PRN  ondansetron Injectable 4 milliGRAM(s) IV Push every 8 hours PRN  pantoprazole    Tablet 40 milliGRAM(s) Oral before breakfast  piperacillin/tazobactam IVPB.. 4.5 Gram(s) IV Intermittent every 12 hours  polyethylene glycol 3350 17 Gram(s) Oral daily  senna 2 Tablet(s) Oral at bedtime  vancomycin  IVPB 750 milliGRAM(s) IV Intermittent every 24 hours    Current Antimicrobials:   piperacillin/tazobactam IVPB.. 4.5 Gram(s) IV Intermittent every 12 hours  vancomycin  IVPB 750 milliGRAM(s) IV Intermittent every 24 hours    Prior/Completed Antimicrobials:  piperacillin/tazobactam IVPB...  vancomycin  IVPB.    Immunologic:   influenza  Vaccine (HIGH DOSE) 0.5 milliLiter(s) IntraMuscular once

## 2025-02-17 NOTE — PROGRESS NOTE ADULT - PROBLEM SELECTOR PLAN 3
Has a history of CKD stage 3, Cr 2.38 at baseline. Presents with Cr 3.27. Likely pre-renal.   - Obtain renal US   - Fe Urea = 29%  - Monitor Cr daily   - Avoid nephrotoxins, dose medication to GFR  - Appreciate nephrology

## 2025-02-17 NOTE — CHART NOTE - NSCHARTNOTEFT_GEN_A_CORE
Received patient from ED to 6T. Patient received to the floor on BIPAP. Patient with history of grand mal seizures, did not receive PO medications in ED d/t need for continuous BIPAP. Patient unable to be weaned from BIPAP to receive PO medications, at this time. Patient currently anti epileptic regimen includes: keppra, lamictal, vimpat and gabapentin. Keppra and Vimpat ordered as IV and given. Consulted house neurology (followed patient during previous admission) regarding recommendations. Per neuro, recommended discontinuing cefepime (neurotoxic) and will see patient regarding medications. Will follow up neurology plan. Plan discussed with patients sister, Ester, over the phone. Will reassess patients ability to wean off BIPAP to take medication in AM.

## 2025-02-17 NOTE — CONSULT NOTE ADULT - SUBJECTIVE AND OBJECTIVE BOX
Pulmonary Consult   02-17-25 @ 08:45    Patient is a 69y old  Male who presents with a chief complaint of Acute on chronic hypoxemic respiratory failure (16 Feb 2025 13:16)      HPI: 68 y/o M with PMH of HTN, HLD, VAZQUEZ on CPAP and home O2 (2LNC daytime), CKD, epilepsy schizophrenia developmental delay, metastatic testicular CA. Recent admission at [facility] for acute respiratory failure in the setting of seizures, influenza infection, pulmonary edema, PNA requiring intubation in MICU. Was eventually discharged to rehab. Presents now with SOB, found to be COVID + at facility on 2/13. Febrile on admission to ED to 101.9F. Placed on Bipap for increased WOB. Pulmonary called to consult for acute respiratory failure.         ?FOLLOWING PRESENT  [ ] Hx of PE/DVT, [ ] Hx COPD, [ ] Hx of Asthma, [ ] Hx of Hospitalization, [ ]  Hx of BiPAP/CPAP use, [ ] Hx of VAZQUEZ    Allergies    seasonal allergies (Unknown)  penicillin (Hives)    Intolerances    latex (Rash)      PAST MEDICAL & SURGICAL HISTORY:  Hypertension, unspecified type      Other hyperlipidemia      History of epilepsy      Paranoid schizophrenia      Obstructive sleep apnea      Testicular cancer      H/O Spinal surgery          FAMILY HISTORY:      Social History: [  ] TOBACCO                  [  ] ETOH                                 [  ] IVDA/DRUGS    REVIEW OF SYSTEMS      General:	    Skin/Breast:  	  Ophthalmologic:  	  ENMT:	    Respiratory and Thorax:  	  Cardiovascular:	    Gastrointestinal:	    Genitourinary:	    Musculoskeletal:	    Neurological:	    Psychiatric:	    Hematology/Lymphatics:	    Endocrine:	    Allergic/Immunologic:	    MEDICATIONS  (STANDING):  acetylcysteine 10%  Inhalation 4 milliLiter(s) Inhalation every 4 hours  albuterol/ipratropium for Nebulization 3 milliLiter(s) Nebulizer every 4 hours  atorvastatin 20 milliGRAM(s) Oral at bedtime  cholecalciferol 1000 Unit(s) Oral daily  escitalopram 15 milliGRAM(s) Oral with breakfast  gabapentin Solution 150 milliGRAM(s) Oral every 12 hours  heparin   Injectable 5000 Unit(s) SubCutaneous every 8 hours  influenza  Vaccine (HIGH DOSE) 0.5 milliLiter(s) IntraMuscular once  lacosamide 100 milliGRAM(s) Oral two times a day  lamoTRIgine 100 milliGRAM(s) Oral two times a day  levETIRAcetam 750 milliGRAM(s) Oral two times a day  lurasidone 40 milliGRAM(s) Oral at bedtime  methylPREDNISolone sodium succinate Injectable 60 milliGRAM(s) IV Push every 8 hours  pantoprazole    Tablet 40 milliGRAM(s) Oral before breakfast  piperacillin/tazobactam IVPB.. 4.5 Gram(s) IV Intermittent every 12 hours  polyethylene glycol 3350 17 Gram(s) Oral daily  senna 2 Tablet(s) Oral at bedtime  vancomycin  IVPB 750 milliGRAM(s) IV Intermittent every 24 hours    MEDICATIONS  (PRN):  acetaminophen     Tablet .. 650 milliGRAM(s) Oral every 6 hours PRN Temp greater or equal to 38C (100.4F), Mild Pain (1 - 3)  aluminum hydroxide/magnesium hydroxide/simethicone Suspension 30 milliLiter(s) Oral every 4 hours PRN Dyspepsia  melatonin 3 milliGRAM(s) Oral at bedtime PRN Insomnia  ondansetron Injectable 4 milliGRAM(s) IV Push every 8 hours PRN Nausea and/or Vomiting       Vital Signs Last 24 Hrs  T(C): 36.4 (17 Feb 2025 04:00), Max: 38.8 (16 Feb 2025 09:56)  T(F): 97.6 (17 Feb 2025 04:00), Max: 101.9 (16 Feb 2025 09:56)  HR: 68 (17 Feb 2025 04:00) (67 - 90)  BP: 144/90 (17 Feb 2025 04:00) (90/45 - 144/90)  BP(mean): 81 (16 Feb 2025 18:03) (81 - 81)  RR: 21 (17 Feb 2025 04:00) (20 - 32)  SpO2: 100% (17 Feb 2025 04:00) (100% - 100%)    Parameters below as of 17 Feb 2025 04:00  Patient On (Oxygen Delivery Method): BiPAP/CPAP    Orthostatic VS          I&O's Summary      Physical Exam:   GENERAL: NAD, well-groomed, well-developed  HEENT: BREE/   Atraumatic, Normocephalic  ENMT: No tonsillar erythema, exudates, or enlargement; Moist mucous membranes, Good dentition, No lesions  NECK: Supple, No JVD, Normal thyroid  CHEST/LUNG: Clear to auscultation bilaterally; No rales, rhonchi, wheezing, or rubs  CVS: Regular rate and rhythm; No murmurs, rubs, or gallops  GI: : Soft, Nontender, Nondistended; Bowel sounds present  NERVOUS SYSTEM:  Alert & Oriented X3, Good concentration; Motor Strength 5/5 B/L upper and lower extremities; DTRs 2+ intact and symmetric  EXTREMITIES:  2+ Peripheral Pulses, No clubbing, cyanosis, or edema  LYMPH: No lymphadenopathy noted  SKIN: No rashes or lesions  ENDOCRINOLOGY: No Thyromegaly  PSYCH: Appropriate    Labs:  ABG - ( 17 Feb 2025 06:00 )  pH, Arterial: 7.34  pH, Blood: x     /  pCO2: 37    /  pO2: 157   / HCO3: 20    / Base Excess: -5.2  /  SaO2: 99.2            Venous<50<4>>30<<7.285>>Venous<<3><<4><<5<<309>>, Venous<48<4>>34<<7.305>>Venous<<3><<4><<5<<349>>                            8.3    4.97  )-----------( 76       ( 17 Feb 2025 05:06 )             26.5                         9.1    6.83  )-----------( 87       ( 16 Feb 2025 09:39 )             29.0     02-17    140  |  108  |  53[H]  ----------------------------<  168[H]  5.3   |  19[L]  |  3.27[H]  02-16    144  |  108  |  45[H]  ----------------------------<  120[H]  4.9   |  23  |  3.27[H]    Ca    8.9      17 Feb 2025 05:06  Ca    8.9      16 Feb 2025 09:39  Mg     2.1     02-16    TPro  6.1  /  Alb  3.0[L]  /  TBili  0.3  /  DBili  x   /  AST  63[H]  /  ALT  43  /  AlkPhos  132[H]  02-17  TPro  6.2  /  Alb  3.2[L]  /  TBili  0.4  /  DBili  x   /  AST  70[H]  /  ALT  48[H]  /  AlkPhos  142[H]  02-16    CAPILLARY BLOOD GLUCOSE      POCT Blood Glucose.: 180 mg/dL (16 Feb 2025 23:57)    LIVER FUNCTIONS - ( 17 Feb 2025 05:06 )  Alb: 3.0 g/dL / Pro: 6.1 g/dL / ALK PHOS: 132 U/L / ALT: 43 U/L / AST: 63 U/L / GGT: x           PT/INR - ( 16 Feb 2025 09:39 )   PT: 11.2 sec;   INR: 0.98 ratio         PTT - ( 16 Feb 2025 09:39 )  PTT:30.3 sec  Urinalysis Basic - ( 17 Feb 2025 05:06 )    Color: x / Appearance: x / SG: x / pH: x  Gluc: 168 mg/dL / Ketone: x  / Bili: x / Urobili: x   Blood: x / Protein: x / Nitrite: x   Leuk Esterase: x / RBC: x / WBC x   Sq Epi: x / Non Sq Epi: x / Bacteria: x      D DImer      Studies    < from: Xray Chest 1 View- PORTABLE-Urgent (Xray Chest 1 View- PORTABLE-Urgent .) (02.16.25 @ 11:01) >    AP chest. Prior dated 2/3/2025    IMPRESSION: Bilateral accentuated interstitial markings more prominent   than on prior likely congestive. Question superimposed focal infiltrate   in the right upper lobe. Correlate clinically for active infection. No   pleural effusion or pneumothorax.    --- End of Report ---    < end of copied text >    < from: CT Chest No Cont (01.30.25 @ 13:37) >  ACC: [id number] EXAM:  CT CHEST   ORDERED BY: GERSON MORTON     PROCEDURE DATE:  01/30/2025          INTERPRETATION:  CLINICAL INFORMATION: Hypoxia, sepsis. Concern for   pneumonia. History of testicular cancer.    COMPARISON: CT chest 1/17/2025.    CONTRAST/COMPLICATIONS:  IV Contrast: NONE  Oral Contrast: NONE  .    PROCEDURE:  CT scan of the chest was obtained without intravenous contrast.    FINDINGS:    AIRWAYS/LUNGS/PLEURA: New secretions at the level of the bronchus   intermedius, with resulting complete right middle and lower lobe   consolidation/collapse. New scattered groundglass opacities are noted in   bilateral upper lobes. Unchanged mild lingular and lower lobe atelectasis   with mild bronchiectasis. No pleural effusion.    MEDIASTINUM AND HA: No lymphadenopathy. Right lower pole thyroid   nodule, better assessed on recent ultrasound from 1/24/2025.    VESSELS: Right IJ approach medication port with tip in the right atrium.   Aortic and coronary artery calcifications.    HEART: Heart size is normal. No pericardial effusion.    VISUALIZED UPPER ABDOMEN: Enteric tube terminates in the stomach on    imaging    CHEST WALL AND BONES: Thoracic spinal fusion hardware. No aggressive   osseous lesion. Old right clavicular and bilateral anterior rib fractures.    IMPRESSION:  New secretions at the level of the bronchus intermedius with complete   right middle/lower bilobar consolidation/collapse.    New scattered bilateral upper lobe groundglass opacities, concerning for   infection.    --- End of Report ---      < end of copied text >               Pulmonary Consult   02-17-25 @ 08:45    Patient is a 69y old  Male who presents with a chief complaint of Acute on chronic hypoxemic respiratory failure (16 Feb 2025 13:16)      HPI: 68 y/o M with PMH of HTN, HLD, VAZQUEZ on CPAP and home O2 (2LNC daytime), CKD, epilepsy schizophrenia developmental delay, metastatic testicular CA. Recent admission at [facility] for acute respiratory failure in the setting of seizures, influenza infection, pulmonary edema, PNA requiring intubation in MICU. Was eventually discharged to rehab. Presents now with SOB, found to be COVID + at facility on 2/13. Febrile on admission to ED to 101.9F. Placed on Bipap then changed to AVAPS for increased WOB (used AVAPS on prior admission as well). Pulmonary called to consult for acute respiratory failure. Pt known from prior admission. Lethargic on exam but oriented. Seen off AVAPS, + congested cough. Reports SOB at times but overall improving since admission.     ?FOLLOWING PRESENT  [x ] Hx of PE/DVT, [ x] Hx COPD, [x ] Hx of Asthma, [y Hx of Hospitalization, [y ]  Hx of BiPAP/CPAP use, [y ] Hx of VAZQUEZ    Allergies    seasonal allergies (Unknown)  penicillin (Hives)    Intolerances    latex (Rash)      PAST MEDICAL & SURGICAL HISTORY:  Hypertension, unspecified type      Other hyperlipidemia      History of epilepsy      Paranoid schizophrenia      Obstructive sleep apnea      Testicular cancer      H/O Spinal surgery      FAMILY HISTORY: non contributory       Social History: [ former  ] TOBACCO                  [ x] ETOH                                 [ x ] IVDA/DRUGS    REVIEW OF SYSTEMS      General:	as above    Skin/Breast: x  	  Ophthalmologic:x  	  ENMT:	 x    Respiratory and Thorax: as above  	  Cardiovascular:	 x    Gastrointestinal:	x    Genitourinary:  x    Musculoskeletal:	x    Neurological:	x    Psychiatric:	x    Hematology/Lymphatics:	 x     Endocrine:	x    Allergic/Immunologic:	x    MEDICATIONS  (STANDING):  acetylcysteine 10%  Inhalation 4 milliLiter(s) Inhalation every 4 hours  albuterol/ipratropium for Nebulization 3 milliLiter(s) Nebulizer every 4 hours  atorvastatin 20 milliGRAM(s) Oral at bedtime  cholecalciferol 1000 Unit(s) Oral daily  escitalopram 15 milliGRAM(s) Oral with breakfast  gabapentin Solution 150 milliGRAM(s) Oral every 12 hours  heparin   Injectable 5000 Unit(s) SubCutaneous every 8 hours  influenza  Vaccine (HIGH DOSE) 0.5 milliLiter(s) IntraMuscular once  lacosamide 100 milliGRAM(s) Oral two times a day  lamoTRIgine 100 milliGRAM(s) Oral two times a day  levETIRAcetam 750 milliGRAM(s) Oral two times a day  lurasidone 40 milliGRAM(s) Oral at bedtime  methylPREDNISolone sodium succinate Injectable 60 milliGRAM(s) IV Push every 8 hours  pantoprazole    Tablet 40 milliGRAM(s) Oral before breakfast  piperacillin/tazobactam IVPB.. 4.5 Gram(s) IV Intermittent every 12 hours  polyethylene glycol 3350 17 Gram(s) Oral daily  senna 2 Tablet(s) Oral at bedtime  vancomycin  IVPB 750 milliGRAM(s) IV Intermittent every 24 hours    MEDICATIONS  (PRN):  acetaminophen     Tablet .. 650 milliGRAM(s) Oral every 6 hours PRN Temp greater or equal to 38C (100.4F), Mild Pain (1 - 3)  aluminum hydroxide/magnesium hydroxide/simethicone Suspension 30 milliLiter(s) Oral every 4 hours PRN Dyspepsia  melatonin 3 milliGRAM(s) Oral at bedtime PRN Insomnia  ondansetron Injectable 4 milliGRAM(s) IV Push every 8 hours PRN Nausea and/or Vomiting       Vital Signs Last 24 Hrs  T(C): 36.4 (17 Feb 2025 04:00), Max: 38.8 (16 Feb 2025 09:56)  T(F): 97.6 (17 Feb 2025 04:00), Max: 101.9 (16 Feb 2025 09:56)  HR: 68 (17 Feb 2025 04:00) (67 - 90)  BP: 144/90 (17 Feb 2025 04:00) (90/45 - 144/90)  BP(mean): 81 (16 Feb 2025 18:03) (81 - 81)  RR: 21 (17 Feb 2025 04:00) (20 - 32)  SpO2: 100% (17 Feb 2025 04:00) (100% - 100%)    Parameters below as of 17 Feb 2025 04:00  Patient On (Oxygen Delivery Method): BiPAP/CPAP    Orthostatic VS    Physical Exam:   GENERAL: NAD, well-groomed, well-developed  HEENT: BREE/   Atraumatic, Normocephalic  ENMT: No tonsillar erythema, exudates, or enlargement; Moist mucous membranes, Good dentition, No lesions  NECK: Supple, No JVD, Normal thyroid  CHEST/LUNG: b/l rhonchi   CVS: Regular rate and rhythm; No murmurs, rubs, or gallops  GI: : Soft, Nontender, Nondistended; Bowel sounds present  NERVOUS SYSTEM:  Lethargic but oriented   EXTREMITIES:  2+ Peripheral Pulses, No clubbing, cyanosis, or edema  LYMPH: No lymphadenopathy noted  SKIN: No rashes or lesions  ENDOCRINOLOGY: No Thyromegaly  PSYCH: Appropriate    Labs:  ABG - ( 17 Feb 2025 06:00 )  pH, Arterial: 7.34  pH, Blood: x     /  pCO2: 37    /  pO2: 157   / HCO3: 20    / Base Excess: -5.2  /  SaO2: 99.2            Venous<50<4>>30<<7.285>>Venous<<3><<4><<5<<309>>, Venous<48<4>>34<<7.305>>Venous<<3><<4><<5<<349>>                            8.3    4.97  )-----------( 76       ( 17 Feb 2025 05:06 )             26.5                         9.1    6.83  )-----------( 87       ( 16 Feb 2025 09:39 )             29.0     02-17    140  |  108  |  53[H]  ----------------------------<  168[H]  5.3   |  19[L]  |  3.27[H]  02-16    144  |  108  |  45[H]  ----------------------------<  120[H]  4.9   |  23  |  3.27[H]    Ca    8.9      17 Feb 2025 05:06  Ca    8.9      16 Feb 2025 09:39  Mg     2.1     02-16    TPro  6.1  /  Alb  3.0[L]  /  TBili  0.3  /  DBili  x   /  AST  63[H]  /  ALT  43  /  AlkPhos  132[H]  02-17  TPro  6.2  /  Alb  3.2[L]  /  TBili  0.4  /  DBili  x   /  AST  70[H]  /  ALT  48[H]  /  AlkPhos  142[H]  02-16    CAPILLARY BLOOD GLUCOSE      POCT Blood Glucose.: 180 mg/dL (16 Feb 2025 23:57)    LIVER FUNCTIONS - ( 17 Feb 2025 05:06 )  Alb: 3.0 g/dL / Pro: 6.1 g/dL / ALK PHOS: 132 U/L / ALT: 43 U/L / AST: 63 U/L / GGT: x           PT/INR - ( 16 Feb 2025 09:39 )   PT: 11.2 sec;   INR: 0.98 ratio         PTT - ( 16 Feb 2025 09:39 )  PTT:30.3 sec  Urinalysis Basic - ( 17 Feb 2025 05:06 )    Color: x / Appearance: x / SG: x / pH: x  Gluc: 168 mg/dL / Ketone: x  / Bili: x / Urobili: x   Blood: x / Protein: x / Nitrite: x   Leuk Esterase: x / RBC: x / WBC x   Sq Epi: x / Non Sq Epi: x / Bacteria: x      D DImer      Studies    < from: Xray Chest 1 View- PORTABLE-Urgent (Xray Chest 1 View- PORTABLE-Urgent .) (02.16.25 @ 11:01) >    AP chest. Prior dated 2/3/2025    IMPRESSION: Bilateral accentuated interstitial markings more prominent   than on prior likely congestive. Question superimposed focal infiltrate   in the right upper lobe. Correlate clinically for active infection. No   pleural effusion or pneumothorax.    --- End of Report ---    < end of copied text >    < from: CT Chest No Cont (01.30.25 @ 13:37) >  ACC: [id number] EXAM:  CT CHEST   ORDERED BY: GERSON MORTON     PROCEDURE DATE:  01/30/2025          INTERPRETATION:  CLINICAL INFORMATION: Hypoxia, sepsis. Concern for   pneumonia. History of testicular cancer.    COMPARISON: CT chest 1/17/2025.    CONTRAST/COMPLICATIONS:  IV Contrast: NONE  Oral Contrast: NONE  .    PROCEDURE:  CT scan of the chest was obtained without intravenous contrast.    FINDINGS:    AIRWAYS/LUNGS/PLEURA: New secretions at the level of the bronchus   intermedius, with resulting complete right middle and lower lobe   consolidation/collapse. New scattered groundglass opacities are noted in   bilateral upper lobes. Unchanged mild lingular and lower lobe atelectasis   with mild bronchiectasis. No pleural effusion.    MEDIASTINUM AND HA: No lymphadenopathy. Right lower pole thyroid   nodule, better assessed on recent ultrasound from 1/24/2025.    VESSELS: Right IJ approach medication port with tip in the right atrium.   Aortic and coronary artery calcifications.    HEART: Heart size is normal. No pericardial effusion.    VISUALIZED UPPER ABDOMEN: Enteric tube terminates in the stomach on    imaging    CHEST WALL AND BONES: Thoracic spinal fusion hardware. No aggressive   osseous lesion. Old right clavicular and bilateral anterior rib fractures.    IMPRESSION:  New secretions at the level of the bronchus intermedius with complete   right middle/lower bilobar consolidation/collapse.    New scattered bilateral upper lobe groundglass opacities, concerning for   infection.    --- End of Report ---      < end of copied text >

## 2025-02-17 NOTE — PROGRESS NOTE ADULT - SUBJECTIVE AND OBJECTIVE BOX
Lying in bed   Saturating high 90s on room air  (+)intermittent dry cough  At rest, not overly SOB    Vital Signs Last 24 Hrs  T(C): 36.5 (17 Feb 2025 11:03), Max: 37.1 (16 Feb 2025 22:18)  T(F): 97.7 (17 Feb 2025 11:03), Max: 98.7 (16 Feb 2025 22:18)  HR: 72 (17 Feb 2025 11:03) (67 - 89)  BP: 136/82 (17 Feb 2025 11:03) (113/60 - 144/90)  BP(mean): 81 (16 Feb 2025 18:03) (81 - 81)  RR: 20 (17 Feb 2025 11:03) (20 - 30)  SpO2: 97% (17 Feb 2025 11:03) (97% - 100%)    I&O's Summary      GENERAL: non-toxic appearing, breathing comfortably on RA  HEENT: NCAT, EOMI  NECK: supple without JVD  RESPIRATORY: residual diffuse rhonchi and crackles present in bilateral lung fields   CARDIOVASCULAR: regular rate and rhythm, normal S1 and S2, no murmur/rub/gallop  ABDOMEN: positive bowel sounds, non-tender, non-distended, no organomegaly   MUSCULOSKELETAL:  moving all four extremities   EXTREMITIES:  no lower extremity edema  NEUROLOGY: awake, alert, oriented at least to self (unable to ask other orientation questions due to severity of illness)    LABS:                        8.3    4.97  )-----------( 76       ( 17 Feb 2025 05:06 )             26.5     02-17    140  |  108  |  53[H]  ----------------------------<  168[H]  5.3   |  19[L]  |  3.27[H]    Ca    8.9      17 Feb 2025 05:06  Mg     2.1     02-16    TPro  6.1  /  Alb  3.0[L]  /  TBili  0.3  /  DBili  x   /  AST  63[H]  /  ALT  43  /  AlkPhos  132[H]  02-17    PT/INR - ( 16 Feb 2025 09:39 )   PT: 11.2 sec;   INR: 0.98 ratio         PTT - ( 16 Feb 2025 09:39 )  PTT:30.3 sec  CAPILLARY BLOOD GLUCOSE      POCT Blood Glucose.: 180 mg/dL (16 Feb 2025 23:57)        Urinalysis Basic - ( 17 Feb 2025 05:06 )    Color: x / Appearance: x / SG: x / pH: x  Gluc: 168 mg/dL / Ketone: x  / Bili: x / Urobili: x   Blood: x / Protein: x / Nitrite: x   Leuk Esterase: x / RBC: x / WBC x   Sq Epi: x / Non Sq Epi: x / Bacteria: x        RADIOLOGY & ADDITIONAL TESTS:    Imaging Personally Reviewed:  [x] YES  [ ] NO    Case discussed with NPP:  [X] YES  [ ] NO

## 2025-02-17 NOTE — CONSULT NOTE ADULT - SUBJECTIVE AND OBJECTIVE BOX
[facility]  [phone]  NEPHROLOGY      INITIAL CONSULT NOTE  --------------------------------------------------------------------------------  HPI:    69 year old male with a past medical history of hypertension, hyperlipemia VAZQUEZ (on CPAP at bedtime, NC 2 liters during the day), CKD stage 3, epilepsy, schizophrenia, developmental delay, metastatic testicular cancer (s/p orchiectomy, actively on chemotherapy, last dose of etoposide/cisplatin on 6/2024) presenting with worsening shortness of breath. Patient was recently hospitalized at [facility] from January 27th 2025 to February 6th 2025 for seizure and influenza virus infection, which required intubation. He was sent to rehab (originally from home) from hospital. He returns due to sudden onset shortness of breath and worsening oxygenation. Of note, he tested positive for COVID-19 at facility on 2/13/25 and was not put on any COVID-19 treatment at the time, only guaifenesin and scopolamine patch. Patient was placed on non-rebreather and sent to the hospital on 2/16/25. In the ER, vital signs significant for T-max 101.9F, HR 84-90. WBC 6.8. Hemoglobin 9.1. Platelet 87. AST 70. ALT 48. Cr 3.27. BUN 45. pH 7.30, pCO2 48. UA negative for infection. Patient given vancomycin, Zosyn, albuterol, and  cc bolus. Requiring bipap. Patient admitted to the general medicine service for further care. Renal consulted for worsening creatinine    PAST HISTORY  --------------------------------------------------------------------------------  PAST MEDICAL & SURGICAL HISTORY:  Hypertension, unspecified type      Other hyperlipidemia      History of epilepsy      Paranoid schizophrenia      Obstructive sleep apnea      Testicular cancer      H/O Spinal surgery        FAMILY HISTORY:    PAST SOCIAL HISTORY:  from rehab    ALLERGIES & MEDICATIONS  --------------------------------------------------------------------------------  Allergies    seasonal allergies (Unknown)  penicillin (Hives)    Intolerances    latex (Rash)    Standing Inpatient Medications  acetylcysteine 20%  Inhalation 3 milliLiter(s) Inhalation every 6 hours  albuterol/ipratropium for Nebulization 3 milliLiter(s) Nebulizer every 6 hours  atorvastatin 20 milliGRAM(s) Oral at bedtime  cholecalciferol 1000 Unit(s) Oral daily  escitalopram 15 milliGRAM(s) Oral with breakfast  gabapentin Solution 150 milliGRAM(s) Oral every 12 hours  heparin   Injectable 5000 Unit(s) SubCutaneous every 8 hours  influenza  Vaccine (HIGH DOSE) 0.5 milliLiter(s) IntraMuscular once  lacosamide 100 milliGRAM(s) Oral two times a day  lamoTRIgine 100 milliGRAM(s) Oral two times a day  levETIRAcetam 750 milliGRAM(s) Oral two times a day  lurasidone 40 milliGRAM(s) Oral at bedtime  pantoprazole    Tablet 40 milliGRAM(s) Oral before breakfast  piperacillin/tazobactam IVPB.. 4.5 Gram(s) IV Intermittent every 12 hours  polyethylene glycol 3350 17 Gram(s) Oral daily  senna 2 Tablet(s) Oral at bedtime  vancomycin  IVPB 750 milliGRAM(s) IV Intermittent every 24 hours    PRN Inpatient Medications  acetaminophen     Tablet .. 650 milliGRAM(s) Oral every 6 hours PRN  aluminum hydroxide/magnesium hydroxide/simethicone Suspension 30 milliLiter(s) Oral every 4 hours PRN  melatonin 3 milliGRAM(s) Oral at bedtime PRN  ondansetron Injectable 4 milliGRAM(s) IV Push every 8 hours PRN      REVIEW OF SYSTEMS  --------------------------------------------------------------------------------  Gen: + fevers/chills   Head/Eyes/Ears/Mouth: No headache; Normal hearing;  Respiratory: +dyspnea, +cough, denies wheezing,  CV: No chest pain, orthopnea  GI: No abdominal pain, diarrhea, nausea, vomiting,   : No dysuria, decrease urination   MSK: No joint pain/swelling; no back pain  Neuro: No dizziness/lightheadedness, +weakness,   also with no edema     VITALS/PHYSICAL EXAM  --------------------------------------------------------------------------------  T(C): 36.5 (02-17-25 @ 11:03), Max: 37.1 (02-16-25 @ 22:18)  HR: 72 (02-17-25 @ 11:03) (67 - 89)  BP: 136/82 (02-17-25 @ 11:03) (113/60 - 144/90)  RR: 20 (02-17-25 @ 11:03) (20 - 30)  SpO2: 97% (02-17-25 @ 11:03) (97% - 100%)  Wt(kg): --  Height (cm): 167.6 (02-16-25 @ 09:10)      02-17-25 @ 07:01  -  02-17-25 @ 13:46  --------------------------------------------------------  IN: 240 mL / OUT: 0 mL / NET: 240 mL      Physical Exam:  	Gen: ill appearing male, on O2 via N/C  	Pulm: decrease bs, +coarse   	CV: No JVD. RRR, S1S2; no rub  	Abd: +BS, soft, nontender/nondistended  	: No suprapubic tenderness, external catheter  	UE: Warm, no cyanosis  no clubbing,  no edema;  	LE: Warm, no cyanosis  no clubbing, no edema  	Neuro: alert and oriented.   	Skin: Warm, no decrease skin turgor     LABS/STUDIES  --------------------------------------------------------------------------------              8.3    4.97  >-----------<  76       [02-17-25 @ 05:06]              26.5     140  |  108  |  53  ----------------------------<  168      [02-17-25 @ 05:06]  5.3   |  19  |  3.27        Ca     8.9     [02-17-25 @ 05:06]      Mg     2.1     [02-16-25 @ 09:39]    TPro  6.1  /  Alb  3.0  /  TBili  0.3  /  DBili  x   /  AST  63  /  ALT  43  /  AlkPhos  132  [02-17-25 @ 05:06]    PT/INR: PT 11.2 , INR 0.98       [02-16-25 @ 09:39]  PTT: 30.3       [02-16-25 @ 09:39]      Creatinine Trend:  SCr 3.27 [02-17 @ 05:06]  SCr 3.27 [02-16 @ 09:39]  SCr 2.38 [02-06 @ 10:35]  SCr 2.30 [02-04 @ 06:34]  SCr 2.12 [02-03 @ 07:19]    Urinalysis (02.16.25 @ 11:47)   pH Urine: 5.5  Blood, Urine: Negative  Glucose Qualitative, Urine: Negative mg/dL  Color: Yellow  Urine Appearance: Clear  Bilirubin: Negative  Ketone - Urine: Negative mg/dL  Specific Gravity: 1.019  Protein, Urine: 300 mg/dL  Urobilinogen: 0.2 mg/dL  Nitrite: Negative  Leukocyte Esterase Concentration: Negative    Urine Creatinine 159      [02-16-25 @ 20:41]  Urine Urea Nitrogen 655      [02-16-25 @ 20:41]    TSH 0.87      [01-25-25 @ 11:31]    HCV 0.05, Nonreact      [01-22-23 @ 07:27]

## 2025-02-17 NOTE — CONSULT NOTE ADULT - PROBLEM SELECTOR RECOMMENDATION 2
-COVID +  -F/u CT chest  -Continue bronchodilators/mucolytics  -Trend inflammatory markers  -DVT ppx  -Steroids as above.

## 2025-02-17 NOTE — PROGRESS NOTE ADULT - ASSESSMENT
69 year old male presenting with acute on chronic hypoxemic respiratory failure, secondary to (a) COVID-19 infection, (b) superimposed pneumonia after recent influenza infection, and/or (c) fluid overload.    69 year old male with a past medical history of hypertension, hyperlipemia VAZQUEZ (on CPAP at bedtime, NC 2 liters during the day), CKD stage 3, epilepsy, schizophrenia, developmental delay, metastatic testicular cancer (s/p orchiectomy, actively on chemotherapy, last dose of etoposide/cisplatin on 6/2024), presenting with acute on chronic hypoxemic respiratory failure, secondary to (a) COVID-19 infection, (b) superimposed pneumonia after recent influenza infection, and/or (c) fluid overload.

## 2025-02-17 NOTE — CONSULT NOTE ADULT - ASSESSMENT
Patient is a 69 year old male with PMH of HTN, HLD, VAZQUEZ (on CPAP at bedtime, NC 2 liters during the day), CKD3, epilepsy, schizophrenia, developmental delay, metastatic testicular cancer (s/p orchiectomy, actively on chemotherapy, last dose of etoposide/cisplatin on 6/2024) presenting with worsening shortness of breath. Patient was recently hospitalized at [facility] from January 27th 2025 to February 6th 2025 for seizure and influenza virus infection, which required intubation. He was sent to rehab (originally from home) from hospital and now returns due to sudden onset shortness of breath and worsening oxygenation. Of note, he tested positive for COVID-19 at facility on 2/13/25 and was not put on any COVID-19 treatment at the time, only guaifenesin and scopolamine patch. Patient was placed on non-rebreather and sent to the hospital on 2/16/25.     Acute on chronic hypoxic respiratory failure  Acute COVID-19 infection/pna, possible superimposed bacterial pneumonia   Fever likely due to above   SHAGUFTA on CKD  - noted discussion with patient/family and decided to hold off on remdesivir given LFTs and SHAGUFTA  - CXR with likely congestive changes, possible superimposed focal infiltrate in RUL, no effusion  - PCT likely not helpful given patient with renal dysfunction   - Legionella urine ag negative   - afebrile now, WBC wnl, on NC     Recommendations:   CT chest without contrast to further evaluate if able   Follow MRSA PCR screen, in process, if negative then d/c vancomycin   Follow Bcx, in process   Follow Strep pneumoniae urine ag   Continue on zosyn  Pulmonary following, on dexamethasone   Supplemental O2 as needed, wean as able   Supportive care  Aspiration precautions  Continue rest of care per primary team       Greyson Mart M.D.  [facility]  Available on [employer] TEAMS - *PREFERRED*  [phone]  After 5pm on weekdays and all day on weekends - please call [phone]     Thank you for consulting us and involving us in the management of this patients case. In addition to reviewing history, imaging, documents, labs, microbiology, took into account antibiotic stewardship, local antibiogram and infection control strategies and potential transmission issues.

## 2025-02-17 NOTE — PROGRESS NOTE ADULT - PROBLEM SELECTOR PLAN 1
?superimposed PNA on top of COVID-19?  - Zosyn 4.5 g q12h, renally dosed  - Vancomycin 750 mg q24h, dose by trough   - Decadron 6mg IVPB daily x 10 days for COVID-19   - Maintain O2 sats above 92%  - BiPaP daytime prn   - BiPaP at bedtime   - Blood cultures x 2 testing  - MRSA swab not sent  - Sputum culture not sent  - Legionella Ur Ag (-)  - Streptococcus pneumoniae testing  - Avoiding diuresis at this time due to hypotension at presentation, as well as SHAGUFTA, but will trial if methods above do not work  - Appreciate pulmonology

## 2025-02-17 NOTE — CHART NOTE - NSCHARTNOTEFT_GEN_A_CORE
Contacted by primary team that patient unable to take PO AED's.     Recommendations   -Inc IV Lacosamide 200 mg bid   -inc IV Keppra 1g TID   -Resume original doses on discharge or when able to take PO meds  -Discontinue cefepime as it is neurotoxic and lowers seizure threshold     D/w epilepsy fellow Dr. Dunbar

## 2025-02-17 NOTE — CONSULT NOTE ADULT - ASSESSMENT
68 y/o M with PMH of HTN, HLD, VAZQUEZ on CPAP and home O2 (2LNC daytime), CKD, epilepsy schizophrenia developmental delay, metastatic testicular CA. Recent admission at [facility] for acute respiratory failure in the setting of seizures, influenza infection, pulmonary edema, PNA requiring intubation in MICU. Was eventually discharged to rehab. Presents now with SOB, found to be COVID + at facility on 2/13. Febrile on admission to ED to 101.9F. Placed on Bipap for increased WOB. Pulmonary called to consult for acute respiratory failure.  70 y/o M with PMH of HTN, HLD, VAZQUEZ on CPAP and home O2 (2LNC daytime), CKD, epilepsy, schizophrenia developmental delay, metastatic testicular CA. Recent admission at [facility] for acute respiratory failure in the setting of seizures, influenza infection, pulmonary edema, PNA requiring intubation in MICU. Was eventually discharged to rehab. Presents now with SOB, found to be COVID + at facility on 2/13. Febrile on admission to ED to 101.9F. Placed on Bipap for increased WOB. Pulmonary called to consult for acute respiratory failure.

## 2025-02-18 DIAGNOSIS — I50.33 ACUTE ON CHRONIC DIASTOLIC (CONGESTIVE) HEART FAILURE: ICD-10-CM

## 2025-02-18 DIAGNOSIS — J18.9 PNEUMONIA, UNSPECIFIED ORGANISM: ICD-10-CM

## 2025-02-18 LAB
-  AMOXICILLIN/CLAVULANIC ACID: SIGNIFICANT CHANGE UP
-  AMPICILLIN/SULBACTAM: SIGNIFICANT CHANGE UP
-  AMPICILLIN: SIGNIFICANT CHANGE UP
-  AZTREONAM: SIGNIFICANT CHANGE UP
-  CEFAZOLIN: SIGNIFICANT CHANGE UP
-  CEFEPIME: SIGNIFICANT CHANGE UP
-  CEFOXITIN: SIGNIFICANT CHANGE UP
-  CEFTRIAXONE: SIGNIFICANT CHANGE UP
-  CEFUROXIME: SIGNIFICANT CHANGE UP
-  CIPROFLOXACIN: SIGNIFICANT CHANGE UP
-  ERTAPENEM: SIGNIFICANT CHANGE UP
-  GENTAMICIN: SIGNIFICANT CHANGE UP
-  IMIPENEM: SIGNIFICANT CHANGE UP
-  LEVOFLOXACIN: SIGNIFICANT CHANGE UP
-  MEROPENEM: SIGNIFICANT CHANGE UP
-  NITROFURANTOIN: SIGNIFICANT CHANGE UP
-  PIPERACILLIN/TAZOBACTAM: SIGNIFICANT CHANGE UP
-  TOBRAMYCIN: SIGNIFICANT CHANGE UP
-  TRIMETHOPRIM/SULFAMETHOXAZOLE: SIGNIFICANT CHANGE UP
ALBUMIN SERPL ELPH-MCNC: 2.9 G/DL — LOW (ref 3.3–5)
ALP SERPL-CCNC: 127 U/L — HIGH (ref 40–120)
ALT FLD-CCNC: 46 U/L — HIGH (ref 10–45)
ANION GAP SERPL CALC-SCNC: 14 MMOL/L — SIGNIFICANT CHANGE UP (ref 5–17)
AST SERPL-CCNC: 70 U/L — HIGH (ref 10–40)
BILIRUB SERPL-MCNC: 0.3 MG/DL — SIGNIFICANT CHANGE UP (ref 0.2–1.2)
BUN SERPL-MCNC: 63 MG/DL — HIGH (ref 7–23)
CALCIUM SERPL-MCNC: 9.2 MG/DL — SIGNIFICANT CHANGE UP (ref 8.4–10.5)
CHLORIDE SERPL-SCNC: 108 MMOL/L — SIGNIFICANT CHANGE UP (ref 96–108)
CO2 SERPL-SCNC: 21 MMOL/L — LOW (ref 22–31)
CREAT SERPL-MCNC: 3.11 MG/DL — HIGH (ref 0.5–1.3)
CULTURE RESULTS: ABNORMAL
EGFR: 21 ML/MIN/1.73M2 — LOW
EGFR: 21 ML/MIN/1.73M2 — LOW
GAS PNL BLDV: SIGNIFICANT CHANGE UP
GLUCOSE SERPL-MCNC: 110 MG/DL — HIGH (ref 70–99)
GRAM STN FLD: ABNORMAL
HCT VFR BLD CALC: 30.1 % — LOW (ref 39–50)
HGB BLD-MCNC: 9.3 G/DL — LOW (ref 13–17)
MCHC RBC-ENTMCNC: 30.9 G/DL — LOW (ref 32–36)
MCHC RBC-ENTMCNC: 32 PG — SIGNIFICANT CHANGE UP (ref 27–34)
MCV RBC AUTO: 103.4 FL — HIGH (ref 80–100)
METHOD TYPE: SIGNIFICANT CHANGE UP
NRBC BLD AUTO-RTO: 0 /100 WBCS — SIGNIFICANT CHANGE UP (ref 0–0)
ORGANISM # SPEC MICROSCOPIC CNT: ABNORMAL
ORGANISM # SPEC MICROSCOPIC CNT: ABNORMAL
PLATELET # BLD AUTO: 94 K/UL — LOW (ref 150–400)
POTASSIUM SERPL-MCNC: 4.5 MMOL/L — SIGNIFICANT CHANGE UP (ref 3.5–5.3)
POTASSIUM SERPL-SCNC: 4.5 MMOL/L — SIGNIFICANT CHANGE UP (ref 3.5–5.3)
PROT SERPL-MCNC: 6 G/DL — SIGNIFICANT CHANGE UP (ref 6–8.3)
RBC # BLD: 2.91 M/UL — LOW (ref 4.2–5.8)
RBC # FLD: 14.4 % — SIGNIFICANT CHANGE UP (ref 10.3–14.5)
S PNEUM AG UR QL: NEGATIVE — SIGNIFICANT CHANGE UP
SODIUM SERPL-SCNC: 143 MMOL/L — SIGNIFICANT CHANGE UP (ref 135–145)
SPECIMEN SOURCE: SIGNIFICANT CHANGE UP
SPECIMEN SOURCE: SIGNIFICANT CHANGE UP
WBC # BLD: 6.65 K/UL — SIGNIFICANT CHANGE UP (ref 3.8–10.5)
WBC # FLD AUTO: 6.65 K/UL — SIGNIFICANT CHANGE UP (ref 3.8–10.5)

## 2025-02-18 PROCEDURE — 93010 ELECTROCARDIOGRAM REPORT: CPT

## 2025-02-18 PROCEDURE — 76770 US EXAM ABDO BACK WALL COMP: CPT | Mod: 26

## 2025-02-18 RX ORDER — LEVETIRACETAM 10 MG/ML
1000 INJECTION, SOLUTION INTRAVENOUS
Refills: 0 | Status: DISCONTINUED | OUTPATIENT
Start: 2025-02-19 | End: 2025-02-28

## 2025-02-18 RX ORDER — ACETAMINOPHEN 500 MG/5ML
650 LIQUID (ML) ORAL ONCE
Refills: 0 | Status: COMPLETED | OUTPATIENT
Start: 2025-02-18 | End: 2025-02-18

## 2025-02-18 RX ORDER — LACOSAMIDE 150 MG/1
200 TABLET, FILM COATED ORAL EVERY 12 HOURS
Refills: 0 | Status: DISCONTINUED | OUTPATIENT
Start: 2025-02-19 | End: 2025-03-20

## 2025-02-18 RX ORDER — REMDESIVIR 5 MG/ML
100 INJECTION INTRAVENOUS EVERY 24 HOURS
Refills: 0 | Status: COMPLETED | OUTPATIENT
Start: 2025-02-19 | End: 2025-02-22

## 2025-02-18 RX ORDER — REMDESIVIR 5 MG/ML
200 INJECTION INTRAVENOUS EVERY 24 HOURS
Refills: 0 | Status: COMPLETED | OUTPATIENT
Start: 2025-02-18 | End: 2025-02-18

## 2025-02-18 RX ORDER — REMDESIVIR 5 MG/ML
INJECTION INTRAVENOUS
Refills: 0 | Status: COMPLETED | OUTPATIENT
Start: 2025-02-18 | End: 2025-02-22

## 2025-02-18 RX ADMIN — GABAPENTIN 150 MILLIGRAM(S): 400 CAPSULE ORAL at 06:00

## 2025-02-18 RX ADMIN — ACETYLCYSTEINE 3 MILLILITER(S): 200 INHALANT RESPIRATORY (INHALATION) at 17:51

## 2025-02-18 RX ADMIN — Medication 25 GRAM(S): at 07:39

## 2025-02-18 RX ADMIN — GABAPENTIN 150 MILLIGRAM(S): 400 CAPSULE ORAL at 17:53

## 2025-02-18 RX ADMIN — LAMOTRIGINE 100 MILLIGRAM(S): 150 TABLET ORAL at 17:53

## 2025-02-18 RX ADMIN — REMDESIVIR 200 MILLIGRAM(S): 5 INJECTION INTRAVENOUS at 20:23

## 2025-02-18 RX ADMIN — IPRATROPIUM BROMIDE AND ALBUTEROL SULFATE 3 MILLILITER(S): .5; 2.5 SOLUTION RESPIRATORY (INHALATION) at 11:44

## 2025-02-18 RX ADMIN — ACETYLCYSTEINE 3 MILLILITER(S): 200 INHALANT RESPIRATORY (INHALATION) at 06:01

## 2025-02-18 RX ADMIN — LEVETIRACETAM 750 MILLIGRAM(S): 10 INJECTION, SOLUTION INTRAVENOUS at 17:53

## 2025-02-18 RX ADMIN — Medication 40 MILLIGRAM(S): at 05:58

## 2025-02-18 RX ADMIN — IPRATROPIUM BROMIDE AND ALBUTEROL SULFATE 3 MILLILITER(S): .5; 2.5 SOLUTION RESPIRATORY (INHALATION) at 23:41

## 2025-02-18 RX ADMIN — ESCITALOPRAM OXALATE 15 MILLIGRAM(S): 20 TABLET ORAL at 07:41

## 2025-02-18 RX ADMIN — HEPARIN SODIUM 5000 UNIT(S): 1000 INJECTION INTRAVENOUS; SUBCUTANEOUS at 13:32

## 2025-02-18 RX ADMIN — LACOSAMIDE 100 MILLIGRAM(S): 150 TABLET, FILM COATED ORAL at 17:53

## 2025-02-18 RX ADMIN — ACETYLCYSTEINE 3 MILLILITER(S): 200 INHALANT RESPIRATORY (INHALATION) at 11:44

## 2025-02-18 RX ADMIN — IPRATROPIUM BROMIDE AND ALBUTEROL SULFATE 3 MILLILITER(S): .5; 2.5 SOLUTION RESPIRATORY (INHALATION) at 17:52

## 2025-02-18 RX ADMIN — IPRATROPIUM BROMIDE AND ALBUTEROL SULFATE 3 MILLILITER(S): .5; 2.5 SOLUTION RESPIRATORY (INHALATION) at 06:01

## 2025-02-18 RX ADMIN — LAMOTRIGINE 100 MILLIGRAM(S): 150 TABLET ORAL at 06:00

## 2025-02-18 RX ADMIN — Medication 25 GRAM(S): at 17:54

## 2025-02-18 RX ADMIN — HEPARIN SODIUM 5000 UNIT(S): 1000 INJECTION INTRAVENOUS; SUBCUTANEOUS at 22:22

## 2025-02-18 RX ADMIN — DEXAMETHASONE 6 MILLIGRAM(S): 0.5 TABLET ORAL at 06:00

## 2025-02-18 RX ADMIN — ACETYLCYSTEINE 3 MILLILITER(S): 200 INHALANT RESPIRATORY (INHALATION) at 23:41

## 2025-02-18 RX ADMIN — Medication 650 MILLIGRAM(S): at 23:00

## 2025-02-18 RX ADMIN — Medication 260 MILLIGRAM(S): at 22:20

## 2025-02-18 RX ADMIN — LEVETIRACETAM 750 MILLIGRAM(S): 10 INJECTION, SOLUTION INTRAVENOUS at 06:00

## 2025-02-18 RX ADMIN — LACOSAMIDE 100 MILLIGRAM(S): 150 TABLET, FILM COATED ORAL at 06:00

## 2025-02-18 RX ADMIN — HEPARIN SODIUM 5000 UNIT(S): 1000 INJECTION INTRAVENOUS; SUBCUTANEOUS at 06:01

## 2025-02-18 NOTE — PROVIDER CONTACT NOTE (OTHER) - ACTION/TREATMENT ORDERED:
Provider stated to place patient back on continuous AVAPs. Provider also ordered ABG. No other interventions ordered at this time. Safety measures maintained, will c/w monitoring.

## 2025-02-18 NOTE — PROGRESS NOTE ADULT - PROBLEM SELECTOR PLAN 1
?superimposed PNA on top of COVID-19?  - Zosyn 4.5 g q12h, renally dosed  - Vancomycin discontinued  - Decadron 6mg IVPB daily x 10 days for COVID-19   - Maintain O2 sats above 92%  - BiPaP daytime prn   - BiPaP at bedtime   - Blood cultures x 2 (-) so far  - MRSA swab negative  - Sputum culture --> few gram variable rods and rare G(+) cocci in pairs  - Legionella Ur Ag (-)  - Streptococcus pneumoniae testing  - Avoiding diuresis at this time due to hypotension at presentation, as well as SHAGUFTA, but will trial if methods above do not work  - Appreciate pulmonology

## 2025-02-18 NOTE — PROGRESS NOTE ADULT - SUBJECTIVE AND OBJECTIVE BOX
[facility]  JACINTO Horton Y. Patel, S. Shah, G. Casimir  [phone]  ([phone] - weekdays after 5pm and weekends)    Name: OSCAR MILAN  Age/Gender: 69y Male  MRN: [id number]    Interval History:  Patient seen and examined this morning.   Feels a bit better today, breathing feels ok.  States productive cough persists.  Denies fever, n/v/d or any other new complaints.   Notes reviewed  No concerning overnight events  Afebrile   Allergies: seasonal allergies (Unknown)  penicillin (Hives)      Objective:  Vitals:   T(F): 99.3 (02-18-25 @ 04:00), Max: 99.3 (02-18-25 @ 04:00)  HR: 99 (02-18-25 @ 05:28) (64 - 99)  BP: 134/83 (02-18-25 @ 04:00) (110/66 - 136/82)  RR: 15 (02-18-25 @ 08:36) (15 - 20)  SpO2: 97% (02-18-25 @ 08:36) (92% - 99%)  Physical Examination:  General: no acute distress, NC, obese   HEENT: normocephalic, atraumatic, anicteric  Respiratory: decreased breath sounds b/l   Cardiovascular: S1 and S2 present, normal rate   Gastrointestinal: normal appearing, nondistended  Extremities: no edema, no cyanosis  Skin: no visible rash    Laboratory Studies:  CBC:                       9.3    6.65  )-----------( 94       ( 18 Feb 2025 05:02 )             30.1     WBC Trend:  6.65 02-18-25 @ 05:02  4.97 02-17-25 @ 05:06  6.83 02-16-25 @ 09:39    CMP: 02-18    143  |  108  |  63[H]  ----------------------------<  110[H]  4.5   |  21[L]  |  3.11[H]    Ca    9.2      18 Feb 2025 05:02  Mg     2.1     02-16    TPro  6.0  /  Alb  2.9[L]  /  TBili  0.3  /  DBili  x   /  AST  70[H]  /  ALT  46[H]  /  AlkPhos  127[H]  02-18    Creatinine: 3.11 mg/dL (02-18-25 @ 05:02)  Creatinine: 3.27 mg/dL (02-17-25 @ 05:06)  Creatinine: 3.27 mg/dL (02-16-25 @ 09:39)    LIVER FUNCTIONS - ( 18 Feb 2025 05:02 )  Alb: 2.9 g/dL / Pro: 6.0 g/dL / ALK PHOS: 127 U/L / ALT: 46 U/L / AST: 70 U/L / GGT: x           Microbiology: reviewed   Culture - Sputum (collected 02-17-25 @ 22:23)  Source: .Sputum Sputum  Gram Stain (02-18-25 @ 06:21):    Few polymorphonuclear leukocytes per low power field    No Squamous epithelial cells per low power field    Few Gram Variable Rods seen per oil power field    Rare Gram positive cocci in pairs seen per oil power field    Culture - Urine (collected 02-16-25 @ 11:47)  Source: Clean Catch Clean Catch (Midstream)  Preliminary Report (02-17-25 @ 22:16):    10,000 - 49,000 CFU/mL Klebsiella pneumoniae    Culture - Blood (collected 02-16-25 @ 09:35)  Source: .Blood BLOOD  Preliminary Report (02-17-25 @ 15:01):    No growth at 24 hours    Culture - Blood (collected 02-16-25 @ 09:25)  Source: .Blood BLOOD  Preliminary Report (02-17-25 @ 15:01):    No growth at 24 hours    02-16-25 @ 09:59 SARS-CoV-2 Detected/Influenza A NotDetec/Influenza B NotDetec/RSV NotDetec    Radiology: reviewed   < from: CT Chest No Cont (02.17.25 @ 16:41) >  IMPRESSION: Limited chest CT due to respiratory motion artifact.    Extensive bilateral groundglass opacities majority of which are new since   January 30, 2025, nonspecific finding may correspond to the given history   of Covid pneumonia.    Interval reaeration of the right lower lobe since January 30, 2025.    Tracheomalacia.    --- End of Report ---    < end of copied text >    Medications:  acetaminophen     Tablet .. 650 milliGRAM(s) Oral every 6 hours PRN  acetylcysteine 20%  Inhalation 3 milliLiter(s) Inhalation every 6 hours  albuterol/ipratropium for Nebulization 3 milliLiter(s) Nebulizer every 6 hours  aluminum hydroxide/magnesium hydroxide/simethicone Suspension 30 milliLiter(s) Oral every 4 hours PRN  atorvastatin 20 milliGRAM(s) Oral at bedtime  cholecalciferol 1000 Unit(s) Oral daily  dexAMETHasone  Injectable 6 milliGRAM(s) IV Push daily  escitalopram 15 milliGRAM(s) Oral with breakfast  gabapentin Solution 150 milliGRAM(s) Oral every 12 hours  heparin   Injectable 5000 Unit(s) SubCutaneous every 8 hours  influenza  Vaccine (HIGH DOSE) 0.5 milliLiter(s) IntraMuscular once  lacosamide 100 milliGRAM(s) Oral two times a day  lamoTRIgine 100 milliGRAM(s) Oral two times a day  levETIRAcetam 750 milliGRAM(s) Oral two times a day  lurasidone 40 milliGRAM(s) Oral at bedtime  melatonin 3 milliGRAM(s) Oral at bedtime PRN  ondansetron Injectable 4 milliGRAM(s) IV Push every 8 hours PRN  pantoprazole    Tablet 40 milliGRAM(s) Oral before breakfast  piperacillin/tazobactam IVPB.. 4.5 Gram(s) IV Intermittent every 12 hours  polyethylene glycol 3350 17 Gram(s) Oral daily  senna 2 Tablet(s) Oral at bedtime    Current Antimicrobials:  piperacillin/tazobactam IVPB.. 4.5 Gram(s) IV Intermittent every 12 hours    Prior/Completed Antimicrobials:  piperacillin/tazobactam IVPB...  vancomycin  IVPB.

## 2025-02-18 NOTE — PROGRESS NOTE ADULT - ASSESSMENT
68 y/o M with PMH of HTN, HLD, VAZQUEZ on CPAP and home O2 (2LNC daytime), CKD, epilepsy, schizophrenia developmental delay, metastatic testicular CA. Recent admission at [facility] for acute respiratory failure in the setting of seizures, influenza infection, pulmonary edema, PNA requiring intubation in MICU. Was eventually discharged to rehab. Presents now with SOB, found to be COVID + at facility on 2/13. Febrile on admission to ED to 101.9F. Placed on Bipap for increased WOB. Pulmonary called to consult for acute respiratory failure.

## 2025-02-18 NOTE — PROGRESS NOTE ADULT - SUBJECTIVE AND OBJECTIVE BOX
Date of Service: 02-18-25 @ 13:34    Patient is a 69y old  Male who presents with a chief complaint of Acute on chronic hypoxemic respiratory failure (18 Feb 2025 10:10)      Any change in ROS:   Awake & alert  Seen earlier this AM on AVAPS - per RN was labored on nasal cannula  Denies CP    MEDICATIONS  (STANDING):  acetylcysteine 20%  Inhalation 3 milliLiter(s) Inhalation every 6 hours  albuterol/ipratropium for Nebulization 3 milliLiter(s) Nebulizer every 6 hours  atorvastatin 20 milliGRAM(s) Oral at bedtime  cholecalciferol 1000 Unit(s) Oral daily  dexAMETHasone  Injectable 6 milliGRAM(s) IV Push daily  escitalopram 15 milliGRAM(s) Oral with breakfast  gabapentin Solution 150 milliGRAM(s) Oral every 12 hours  heparin   Injectable 5000 Unit(s) SubCutaneous every 8 hours  influenza  Vaccine (HIGH DOSE) 0.5 milliLiter(s) IntraMuscular once  lacosamide 100 milliGRAM(s) Oral two times a day  lamoTRIgine 100 milliGRAM(s) Oral two times a day  levETIRAcetam 750 milliGRAM(s) Oral two times a day  lurasidone 40 milliGRAM(s) Oral at bedtime  pantoprazole    Tablet 40 milliGRAM(s) Oral before breakfast  piperacillin/tazobactam IVPB.. 4.5 Gram(s) IV Intermittent every 12 hours  polyethylene glycol 3350 17 Gram(s) Oral daily  senna 2 Tablet(s) Oral at bedtime    MEDICATIONS  (PRN):  acetaminophen     Tablet .. 650 milliGRAM(s) Oral every 6 hours PRN Temp greater or equal to 38C (100.4F), Mild Pain (1 - 3)  aluminum hydroxide/magnesium hydroxide/simethicone Suspension 30 milliLiter(s) Oral every 4 hours PRN Dyspepsia  melatonin 3 milliGRAM(s) Oral at bedtime PRN Insomnia  ondansetron Injectable 4 milliGRAM(s) IV Push every 8 hours PRN Nausea and/or Vomiting    Vital Signs Last 24 Hrs  T(C): 37.1 (18 Feb 2025 10:57), Max: 37.4 (18 Feb 2025 04:00)  T(F): 98.7 (18 Feb 2025 10:57), Max: 99.3 (18 Feb 2025 04:00)  HR: 97 (18 Feb 2025 11:15) (64 - 99)  BP: 124/79 (18 Feb 2025 10:57) (110/66 - 134/83)  BP(mean): --  RR: 20 (18 Feb 2025 10:57) (15 - 20)  SpO2: 94% (18 Feb 2025 11:15) (92% - 99%)    Parameters below as of 18 Feb 2025 10:57  Patient On (Oxygen Delivery Method): BiPAP/CPAP        I&O's Summary    17 Feb 2025 07:01  -  18 Feb 2025 07:00  --------------------------------------------------------  IN: 530 mL / OUT: 850 mL / NET: -320 mL    18 Feb 2025 07:01  -  18 Feb 2025 13:34  --------------------------------------------------------  IN: 0 mL / OUT: 800 mL / NET: -800 mL          Physical Exam:   GENERAL: NAD, well-groomed, well-developed  HEENT: BREE/   Atraumatic, Normocephalic  ENMT: No tonsillar erythema, exudates, or enlargement; Moist mucous membranes, Good dentition, No lesions  NECK: Supple, No JVD, Normal thyroid  CHEST/LUNG: b/l rhonchi   CVS: Regular rate and rhythm; No murmurs, rubs, or gallops  GI: : Soft, Nontender, Nondistended; Bowel sounds present  NERVOUS SYSTEM:  Alert & Oriented X3  EXTREMITIES:  2+ Peripheral Pulses, No clubbing, cyanosis, or edema  LYMPH: No lymphadenopathy noted  SKIN: No rashes or lesions  ENDOCRINOLOGY: No Thyromegaly  PSYCH: Appropriate    Labs:  ABG - ( 17 Feb 2025 06:00 )  pH, Arterial: 7.34  pH, Blood: x     /  pCO2: 37    /  pO2: 157   / HCO3: 20    / Base Excess: -5.2  /  SaO2: 99.2            24, 24                            9.3    6.65  )-----------( 94       ( 18 Feb 2025 05:02 )             30.1                         8.3    4.97  )-----------( 76       ( 17 Feb 2025 05:06 )             26.5                         9.1    6.83  )-----------( 87       ( 16 Feb 2025 09:39 )             29.0     02-18    143  |  108  |  63[H]  ----------------------------<  110[H]  4.5   |  21[L]  |  3.11[H]  02-17    140  |  108  |  53[H]  ----------------------------<  168[H]  5.3   |  19[L]  |  3.27[H]  02-16    144  |  108  |  45[H]  ----------------------------<  120[H]  4.9   |  23  |  3.27[H]    Ca    9.2      18 Feb 2025 05:02  Ca    8.9      17 Feb 2025 05:06    TPro  6.0  /  Alb  2.9[L]  /  TBili  0.3  /  DBili  x   /  AST  70[H]  /  ALT  46[H]  /  AlkPhos  127[H]  02-18  TPro  6.1  /  Alb  3.0[L]  /  TBili  0.3  /  DBili  x   /  AST  63[H]  /  ALT  43  /  AlkPhos  132[H]  02-17  TPro  6.2  /  Alb  3.2[L]  /  TBili  0.4  /  DBili  x   /  AST  70[H]  /  ALT  48[H]  /  AlkPhos  142[H]  02-16    CAPILLARY BLOOD GLUCOSE          LIVER FUNCTIONS - ( 18 Feb 2025 05:02 )  Alb: 2.9 g/dL / Pro: 6.0 g/dL / ALK PHOS: 127 U/L / ALT: 46 U/L / AST: 70 U/L / GGT: x             Urinalysis Basic - ( 18 Feb 2025 05:02 )    Color: x / Appearance: x / SG: x / pH: x  Gluc: 110 mg/dL / Ketone: x  / Bili: x / Urobili: x   Blood: x / Protein: x / Nitrite: x   Leuk Esterase: x / RBC: x / WBC x   Sq Epi: x / Non Sq Epi: x / Bacteria: x            RECENT CULTURES:  02-17 @ 22:23 .Sputum Sputum       Few polymorphonuclear leukocytes per low power field  No Squamous epithelial cells per low power field  Few Gram Variable Rods seen per oil power field  Rare Gram positive cocci in pairs seen per oil power field             02-16 @ 11:47 Clean Catch Clean Catch (Midstream)   MARIELENA      Klebsiella pneumoniae  Klebsiella pneumoniae     10,000 - 49,000 CFU/mL Klebsiella pneumoniae    02-16 @ 09:35 .Blood BLOOD                No growth at 24 hours    02-16 @ 09:25 .Blood BLOOD                No growth at 24 hours    Studies  < from: CT Chest No Cont (02.17.25 @ 16:41) >    ACC: [id number] EXAM:  CT CHEST   ORDERED BY: GERSON MORTON     PROCEDURE DATE:  02/17/2025          INTERPRETATION:  INDICATION: Pneumonia, Covid.    Axial CT images of the chest are obtained without intravenous   administration of contrast.    COMPARISON: Chest CT of January 30, 2025.    Right chest wall Mediport as on the prior study.    LYMPH NODES/MEDIASTINUM: No lymphadenopathy.    VASCULATURE/HEART: No pericardial effusion. Vascular calcifications with   involvement of the aorta and the coronary arteries. Heart size appears   enlarged.    UPPER ABDOMEN: Hypodensity within the partially imaged right kidney   suggestive of a cyst.    LUNGS/PLEURA: Limited evaluation due to significant respiratory motion   artifact.    No pleural effusions.    Interval reaeration of the right lower lobe since January 30, 2025 with   residual linear opacities suggestive of subsegmental atelectasis.    Other bilateral mid to upper lung predominant groundglass opacities with   involvement of all 5 lobes majority of which are new since January 30, 2025.    Collapse of the trachea on this expiratory CT representing   tracheomalacia. Secretions within the trachea.    CHEST WALL: Spinal Degenerative Changes. Lower spinal surgery with   partially imagedhardware. Old healed bilateral rib fractures. Old right   clavicular fracture.    IMPRESSION: Limited chest CT due to respiratory motion artifact.    Extensive bilateral groundglass opacities majority of which are new since   January 30, 2025, nonspecific finding may correspond to the given history   of Covid pneumonia.    Interval reaeration of the right lower lobe since January 30, 2025.    Tracheomalacia.      < end of copied text >

## 2025-02-18 NOTE — PROGRESS NOTE ADULT - ASSESSMENT
69 year old male with a past medical history of hypertension, hyperlipemia VAZQUEZ (on CPAP at bedtime, NC 2 liters during the day), CKD stage 3, epilepsy, schizophrenia, developmental delay, metastatic testicular cancer (s/p orchiectomy, actively on chemotherapy, last dose of etoposide/cisplatin on 6/2024) presenting with worsening shortness of breath. Patient was recently hospitalized at [facility] from January 27th 2025 to February 6th 2025 for seizure and influenza virus infection, which required intubation. He was sent to rehab (originally from home) from hospital. He returns due to sudden onset shortness of breath and worsening oxygenation. Of note, he tested positive for COVID-19       1- SHAGUFTA on CKDIII  2- covid-19  3- CHF  4- anemia      SHAGUFTA suspected in setting of new infection. covid 19   to check bladder scan to eval for retention  U/A reviewed  pt is with worsening pulm symptoms due to covid 19   decadron 6 mg iv daily   remdisivir   d/w pt sister and d/w her R/B/A with remdisivir  it is very concerning that his sx have been worsening   hold further ivf given sob  anemia, trend hgb

## 2025-02-18 NOTE — CONSULT NOTE ADULT - SUBJECTIVE AND OBJECTIVE BOX
DATE OF SERVICE: 02-18-25     CHIEF COMPLAINT:Patient is a 69y old  Male who presents with a chief complaint of Acute on chronic hypoxemic respiratory failure (18 Feb 2025 22:01)      69 year old male with a past medical history of hypertension, hyperlipemia VAZQUEZ (on CPAP at bedtime, NC 2 liters during the day), CKD stage 3, epilepsy, schizophrenia, developmental delay, metastatic testicular cancer (s/p orchiectomy, actively on chemotherapy, last dose of etoposide/cisplatin on 6/2024) presenting with worsening shortness of breath.     Patient was recently hospitalized at [facility] from January 27th 2025 to February 6th 2025 for seizure and influenza virus infection, which required intubation. He was sent to rehab (originally from home) from hospital.     He returns due to sudden onset shortness of breath and worsening oxygenation. Of note, he tested positive for COVID-19 at facility on 2/13/25 and was not put on any COVID-19 treatment at the time, only guaifenesin and scopolamine patch. Patient was placed on non-rebreather and sent to the hospital on 2/16/25.     In the ER, vital signs significant for T-max 101.9F, HR 84-90. WBC 6.8. Hemoglobin 9.1. Platelet 87. AST 70. ALT 48. Cr 3.27. BUN 45. pH 7.30, pCO2 48. UA negative for infection. Patient given vancomycin, Zosyn, albuterol, and  cc bolus. Patient admitted to the general medicine service for further care.     Patient evaluated at bedside during admission. Unable to talk with the patient given that he is on BIPAP. Attempted to take the patient off BIPAP while in the room, but patient's work of breathing immediately began to increase and patient endorsed shortness of breath, so he was placed back on. History taken by chart review.  (16 Feb 2025 13:16)      PAST MEDICAL & SURGICAL HISTORY:  Hypertension, unspecified type      Other hyperlipidemia      History of epilepsy      Paranoid schizophrenia      Obstructive sleep apnea      Testicular cancer      H/O Spinal surgery              MEDICATIONS:  heparin   Injectable 5000 Unit(s) SubCutaneous every 8 hours    piperacillin/tazobactam IVPB.. 4.5 Gram(s) IV Intermittent every 12 hours  remdesivir  IVPB   IV Intermittent     acetylcysteine 20%  Inhalation 3 milliLiter(s) Inhalation every 6 hours  albuterol/ipratropium for Nebulization 3 milliLiter(s) Nebulizer every 6 hours    acetaminophen     Tablet .. 650 milliGRAM(s) Oral every 6 hours PRN  escitalopram 15 milliGRAM(s) Oral with breakfast  gabapentin Solution 150 milliGRAM(s) Oral every 12 hours  lurasidone 40 milliGRAM(s) Oral at bedtime  melatonin 3 milliGRAM(s) Oral at bedtime PRN  ondansetron Injectable 4 milliGRAM(s) IV Push every 8 hours PRN    aluminum hydroxide/magnesium hydroxide/simethicone Suspension 30 milliLiter(s) Oral every 4 hours PRN  pantoprazole    Tablet 40 milliGRAM(s) Oral before breakfast  polyethylene glycol 3350 17 Gram(s) Oral daily  senna 2 Tablet(s) Oral at bedtime    atorvastatin 20 milliGRAM(s) Oral at bedtime  dexAMETHasone  Injectable 6 milliGRAM(s) IV Push daily    cholecalciferol 1000 Unit(s) Oral daily  influenza  Vaccine (HIGH DOSE) 0.5 milliLiter(s) IntraMuscular once      FAMILY HISTORY:      Non-contributory    SOCIAL HISTORY:    [ ] not current smoker    Allergies    seasonal allergies (Unknown)  penicillin (Hives)    Intolerances    latex (Rash)  	    REVIEW OF SYSTEMS:  CONSTITUTIONAL: No fever  EYES: No eye pain, visual disturbances, or discharge  ENMT:  No difficulty hearing, tinnitus  NECK: No pain or stiffness  RESPIRATORY: No cough, wheezing,  CARDIOVASCULAR: No chest pain, palpitations, passing out, dizziness, or leg swelling  GASTROINTESTINAL:  No nausea, vomiting, diarrhea or constipation. No melena.  GENITOURINARY: No dysuria, hematuria  NEUROLOGICAL: No stroke like symptoms  SKIN: No burning or lesions   ENDOCRINE: No heat or cold intolerance  MUSCULOSKELETAL: No joint pain or swelling  PSYCHIATRIC: No  anxiety, mood swings  HEME/LYMPH: No bleeding gums  ALLERGY AND IMMUNOLOGIC: No hives or eczema	    All other ROS negative    PHYSICAL EXAM:  T(C): 38.2 (02-18-25 @ 22:58), Max: 38.2 (02-18-25 @ 22:58)  HR: 88 (02-18-25 @ 22:58) (76 - 99)  BP: 155/68 (02-18-25 @ 22:58) (124/79 - 157/84)  RR: 19 (02-18-25 @ 22:58) (15 - 20)  SpO2: 97% (02-18-25 @ 22:58) (87% - 100%)  Wt(kg): --  I&O's Summary    17 Feb 2025 07:01  -  18 Feb 2025 07:00  --------------------------------------------------------  IN: 530 mL / OUT: 850 mL / NET: -320 mL    18 Feb 2025 07:01  -  18 Feb 2025 23:10  --------------------------------------------------------  IN: 0 mL / OUT: 950 mL / NET: -950 mL        Appearance: Normal	  HEENT:   Normal oral mucosa, EOMI	  Cardiovascular:  S1 S2, No JVD,    Respiratory: Lungs clear to auscultation	  Psychiatry: Alert  Gastrointestinal:  Soft, Non-tender, + BS	  Skin: No rashes   Neurologic: Non-focal  Extremities:  No edema  Vascular: Peripheral pulses palpable    	    	  	  CARDIAC MARKERS:  Labs personally reviewed by me                                  9.3    6.65  )-----------( 94       ( 18 Feb 2025 05:02 )             30.1     02-18    143  |  108  |  63[H]  ----------------------------<  110[H]  4.5   |  21[L]  |  3.11[H]    Ca    9.2      18 Feb 2025 05:02    TPro  6.0  /  Alb  2.9[L]  /  TBili  0.3  /  DBili  x   /  AST  70[H]  /  ALT  46[H]  /  AlkPhos  127[H]  02-18          EKG: Personally reviewed by me - NSR  Radiology: Personally reviewed by me - Limited chest CT due to respiratory motion artifact.  Extensive bilateral groundglass opacities majority of which are new since January 30, 2025, nonspecific finding may correspond to the given history of Covid pneumonia.        Assessment and Plan:   · Assessment	  69 year old male with a past medical history of hypertension, hyperlipemia VAZQUEZ (on CPAP at bedtime, NC 2 liters during the day), CKD stage 3, epilepsy, schizophrenia, developmental delay, metastatic testicular cancer (s/p orchiectomy, actively on chemotherapy, last dose of etoposide/cisplatin on 6/2024), presenting with acute on chronic hypoxemic respiratory failure, secondary to (a) COVID-19 infection, (b) superimposed pneumonia after recent influenza infection, and/or (c) fluid overload.         Problem/Plan - 1:  ·  Problem: Acute on chronic respiratory failure with hypoxia.   ·  Plan: Likely 2/2 PNA, does not appear to be in decompensated HF  - Agree with holding diuresis   - Appreciate pulmonology.    Problem/Plan - 2:  ·  Problem: SHAGUFTA (acute kidney injury).   ·  Plan: Has a history of CKD stage 3, Cr 2.38 at baseline. Presents with Cr 3.27. Likely pre-renal state  - Appreciate nephrology.    Problem/Plan - 3:  ·  Problem: Chronic heart failure with preserved ejection fraction.   ·  Plan: TTE done here 1/17/25 technically difficult, but LV systolic fxn appears nl without any regional wall motion abnormalities  - BNP 5000 <---1100 but appears euvolemic   - Hold off diuretics     Problem/Plan - 4:  ·  Problem: Prophylactic measure.   ·  Plan:  DVT prophylaxis: heparin 5000 units q8h            Gus Andrew DO Located within [facility]  Cardiovascular Medicine  [address] [place] [address], [address]  Office [phone]  Available via call/text on [employer] Teams

## 2025-02-18 NOTE — PROGRESS NOTE ADULT - SUBJECTIVE AND OBJECTIVE BOX
Lying in bed   Tolerated BiPaP last night  Saturating high 90s on 3-4LNCO2  (+)intermittent dry cough  99.3F temp  @ 4 AM today      Vital Signs Last 24 Hrs  T(C): 37.4 (18 Feb 2025 04:00), Max: 37.4 (18 Feb 2025 04:00)  T(F): 99.3 (18 Feb 2025 04:00), Max: 99.3 (18 Feb 2025 04:00)  HR: 99 (18 Feb 2025 05:28) (64 - 99)  BP: 134/83 (18 Feb 2025 04:00) (110/66 - 136/82)  BP(mean): --  RR: 15 (18 Feb 2025 08:36) (15 - 20)  SpO2: 97% (18 Feb 2025 08:36) (92% - 99%)    I&O's Summary    02-17-25 @ 07:01  -  02-18-25 @ 07:00  --------------------------------------------------------  IN: 530 mL / OUT: 850 mL / NET: -320 mL        GENERAL: non-toxic appearing, breathing comfortably on NC  HEENT: NCAT, EOMI  NECK: supple without JVD  RESPIRATORY: residual diffuse rhonchi and crackles present in bilateral lung fields   CARDIOVASCULAR: regular rate and rhythm, normal S1 and S2, no murmur/rub/gallop  ABDOMEN: positive bowel sounds, non-tender, non-distended, no organomegaly   MUSCULOSKELETAL:  moving all four extremities   EXTREMITIES:  no lower extremity edema  NEUROLOGY: awake, alert, oriented at least to self (unable to ask other orientation questions due to severity of illness)      LABS:                        9.3    6.65  )-----------( 94       ( 18 Feb 2025 05:02 )             30.1     02-18    143  |  108  |  63[H]  ----------------------------<  110[H]  4.5   |  21[L]  |  3.11[H]    Ca    9.2      18 Feb 2025 05:02    TPro  6.0  /  Alb  2.9[L]  /  TBili  0.3  /  DBili  x   /  AST  70[H]  /  ALT  46[H]  /  AlkPhos  127[H]  02-18      CAPILLARY BLOOD GLUCOSE            Urinalysis Basic - ( 18 Feb 2025 05:02 )    Color: x / Appearance: x / SG: x / pH: x  Gluc: 110 mg/dL / Ketone: x  / Bili: x / Urobili: x   Blood: x / Protein: x / Nitrite: x   Leuk Esterase: x / RBC: x / WBC x   Sq Epi: x / Non Sq Epi: x / Bacteria: x        RADIOLOGY & ADDITIONAL TESTS:    Imaging Personally Reviewed:  [x] YES  [ ] NO    Case discussed with NPP:  [X] YES  [ ] NO

## 2025-02-18 NOTE — PROGRESS NOTE ADULT - SUBJECTIVE AND OBJECTIVE BOX
[facility]   [phone]  RENAL FOLLOW UP NOTE  --------------------------------------------------------------------------------  Chief Complaint:    24 hour events/subjective:    seen earlier   on bipap   still with c/o sob     PAST HISTORY  --------------------------------------------------------------------------------  No significant changes to PMH, PSH, FHx, SHx, unless otherwise noted    ALLERGIES & MEDICATIONS  --------------------------------------------------------------------------------  Allergies    seasonal allergies (Unknown)  penicillin (Hives)    Intolerances    latex (Rash)    Standing Inpatient Medications  acetaminophen   IVPB .. 650 milliGRAM(s) IV Intermittent once  acetylcysteine 20%  Inhalation 3 milliLiter(s) Inhalation every 6 hours  albuterol/ipratropium for Nebulization 3 milliLiter(s) Nebulizer every 6 hours  atorvastatin 20 milliGRAM(s) Oral at bedtime  cholecalciferol 1000 Unit(s) Oral daily  dexAMETHasone  Injectable 6 milliGRAM(s) IV Push daily  escitalopram 15 milliGRAM(s) Oral with breakfast  gabapentin Solution 150 milliGRAM(s) Oral every 12 hours  heparin   Injectable 5000 Unit(s) SubCutaneous every 8 hours  influenza  Vaccine (HIGH DOSE) 0.5 milliLiter(s) IntraMuscular once  lacosamide 100 milliGRAM(s) Oral two times a day  lamoTRIgine 100 milliGRAM(s) Oral two times a day  levETIRAcetam 750 milliGRAM(s) Oral two times a day  lurasidone 40 milliGRAM(s) Oral at bedtime  pantoprazole    Tablet 40 milliGRAM(s) Oral before breakfast  piperacillin/tazobactam IVPB.. 4.5 Gram(s) IV Intermittent every 12 hours  polyethylene glycol 3350 17 Gram(s) Oral daily  remdesivir  IVPB   IV Intermittent   senna 2 Tablet(s) Oral at bedtime    PRN Inpatient Medications  acetaminophen     Tablet .. 650 milliGRAM(s) Oral every 6 hours PRN  aluminum hydroxide/magnesium hydroxide/simethicone Suspension 30 milliLiter(s) Oral every 4 hours PRN  melatonin 3 milliGRAM(s) Oral at bedtime PRN  ondansetron Injectable 4 milliGRAM(s) IV Push every 8 hours PRN      REVIEW OF SYSTEMS  --------------------------------------------------------------------------------    Gen: denies  fevers/chills, or HA or dizziness   CVS: denies chest pain/palpitations  Resp:  +  SOB/Cough +   GI: Denies N/V/Abd pain  : Denies dysuria    VITALS/PHYSICAL EXAM  --------------------------------------------------------------------------------  T(C): 37.4 (02-18-25 @ 20:56), Max: 37.4 (02-18-25 @ 04:00)  HR: 98 (02-18-25 @ 20:56) (76 - 99)  BP: 155/88 (02-18-25 @ 20:56) (124/79 - 157/84)  RR: 19 (02-18-25 @ 20:56) (15 - 20)  SpO2: 100% (02-18-25 @ 20:56) (87% - 100%)  Wt(kg): --        02-17-25 @ 07:01  -  02-18-25 @ 07:00  --------------------------------------------------------  IN: 530 mL / OUT: 850 mL / NET: -320 mL    02-18-25 @ 07:01  -  02-18-25 @ 22:03  --------------------------------------------------------  IN: 0 mL / OUT: 950 mL / NET: -950 mL      Physical Exam:  	    Gen: ill appearing male, on avaps   	Pulm: decrease bs, +coarse   	CV: No JVD. RRR, S1S2; no rub  	Abd: +BS, soft, nontender/nondistended  	: No suprapubic tenderness, external catheter  	UE: Warm, no cyanosis  no clubbing,  no edema;  	LE: Warm, no cyanosis  no clubbing, no edema  	Neuro: alert and oriented.     LABS/STUDIES  --------------------------------------------------------------------------------              9.3    6.65  >-----------<  94       [02-18-25 @ 05:02]              30.1     143  |  108  |  63  ----------------------------<  110      [02-18-25 @ 05:02]  4.5   |  21  |  3.11        Ca     9.2     [02-18-25 @ 05:02]    TPro  6.0  /  Alb  2.9  /  TBili  0.3  /  DBili  x   /  AST  70  /  ALT  46  /  AlkPhos  127  [02-18-25 @ 05:02]          Creatinine Trend:  SCr 3.11 [02-18 @ 05:02]  SCr 3.27 [02-17 @ 05:06]  SCr 3.27 [02-16 @ 09:39]  SCr 2.38 [02-06 @ 10:35]  SCr 2.30 [02-04 @ 06:34]    Urinalysis (02.16.25 @ 11:47)   Blood, Urine: Negative  pH Urine: 5.5  Glucose Qualitative, Urine: Negative mg/dL  Color: Yellow  Urine Appearance: Clear  Bilirubin: Negative  Ketone - Urine: Negative mg/dL  Specific Gravity: 1.019  Protein, Urine: 300 mg/dL  Urobilinogen: 0.2 mg/dL  Nitrite: Negative  Leukocyte Esterase Concentration: Negative    Urine Creatinine 159      [02-16-25 @ 20:41]  Urine Urea Nitrogen 655      [02-16-25 @ 20:41]    TSH 0.87      [01-25-25 @ 11:31]

## 2025-02-18 NOTE — CONSULT NOTE ADULT - SUBJECTIVE AND OBJECTIVE BOX
[facility] HEMATOLOGY/ONCOLOGY CONSULT NOTE     HPI:  Mr. Gr is a 69 year old male with PMHx of seizure disorder, sleep apnea, HTN, chronic idiopathic constipation, stage III CKD, severe obesity, secondary hyperparathyroidism, anemia, thrombocytopenia, hyperlipidemia, testicular cancer under treatment with Dr. Ovalles who is admitted for hypoxic respiratory failure secondary to COVID.     Former smoker, quit 14y prior, denied ETOH, drug use. Lives at home. Does not have children. Denies family history of blood disorders or malignancy.  Denies previous workplace related exposures.  Denies previous history of blood transfusions.  Denied history of thromboses.  Denied history of  requiring anticoagulation.     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  Margins were negative.  pT3Nx. CT C/A/P with few left para-aortic and left iliac chain lymph nodes largest measuring 8.1 cm left para-aortic node, bilateral subcentimeter noncalcified pulmonary nodules measuring up to 7 mm. AFP, LDH, hCG were all elevated. 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 Started on adjuvant therapy with Cisplatin+Etoposide, so far completed 4 cycles of treatment with cisplatin dose reduced 50% and Etoposide 50% due to thrombocytopenia.    ROS: pertinent positives and negatives as per HPI    Allergies: seasonal allergies (Unknown)  penicillin (Hives)    PMHx:  Hypertension, unspecified type  Other hyperlipidemia  History of epilepsy  Paranoid schizophrenia  Obstructive sleep apnea  Testicular cancer    SurgHx:   H/O Spinal surgery    FHx:     SocHx:     Meds:   MEDICATIONS  (STANDING):  acetylcysteine 20%  Inhalation 3 milliLiter(s) Inhalation every 6 hours  albuterol/ipratropium for Nebulization 3 milliLiter(s) Nebulizer every 6 hours  atorvastatin 20 milliGRAM(s) Oral at bedtime  cholecalciferol 1000 Unit(s) Oral daily  dexAMETHasone  Injectable 6 milliGRAM(s) IV Push daily  escitalopram 15 milliGRAM(s) Oral with breakfast  gabapentin Solution 150 milliGRAM(s) Oral every 12 hours  heparin   Injectable 5000 Unit(s) SubCutaneous every 8 hours  influenza  Vaccine (HIGH DOSE) 0.5 milliLiter(s) IntraMuscular once  lacosamide 100 milliGRAM(s) Oral two times a day  lamoTRIgine 100 milliGRAM(s) Oral two times a day  levETIRAcetam 750 milliGRAM(s) Oral two times a day  lurasidone 40 milliGRAM(s) Oral at bedtime  pantoprazole    Tablet 40 milliGRAM(s) Oral before breakfast  piperacillin/tazobactam IVPB.. 4.5 Gram(s) IV Intermittent every 12 hours  polyethylene glycol 3350 17 Gram(s) Oral daily  senna 2 Tablet(s) Oral at bedtime  vancomycin  IVPB 750 milliGRAM(s) IV Intermittent every 24 hours    MEDICATIONS  (PRN):  acetaminophen     Tablet .. 650 milliGRAM(s) Oral every 6 hours PRN Temp greater or equal to 38C (100.4F), Mild Pain (1 - 3)  aluminum hydroxide/magnesium hydroxide/simethicone Suspension 30 milliLiter(s) Oral every 4 hours PRN Dyspepsia  melatonin 3 milliGRAM(s) Oral at bedtime PRN Insomnia  ondansetron Injectable 4 milliGRAM(s) IV Push every 8 hours PRN Nausea and/or Vomiting    Vital Signs  T(C): 37.4 (02-18-25 @ 04:00), Max: 37.4 (02-18-25 @ 04:00)  T(F): 99.3 (02-18-25 @ 04:00), Max: 99.3 (02-18-25 @ 04:00)  HR: 76 (02-18-25 @ 04:00) (64 - 88)  BP: 134/83 (02-18-25 @ 04:00) (110/66 - 136/82)  RR: 17 (02-18-25 @ 04:00) (17 - 20)  SpO2: 99% (02-18-25 @ 04:00) (96% - 99%)    Physical Exam:  Gen:   HEENT:   Chest:   Cardiac:  Abd:   Ext:   Neuro:   Integument:     Labs:  CBC Full  -  ( 17 Feb 2025 05:06 )  WBC Count : 4.97 K/uL  RBC Count : 2.62 M/uL  Hemoglobin : 8.3 g/dL  Hematocrit : 26.5 %  Platelet Count - Automated : 76 K/uL  Mean Cell Volume : 101.1 fl  Mean Cell Hemoglobin : 31.7 pg  Mean Cell Hemoglobin Concentration : 31.3 g/dL    02-17    140  |  108  |  53[H]  ----------------------------<  168[H]  5.3   |  19[L]  |  3.27[H]    Ca    8.9      17 Feb 2025 05:06  Mg     2.1     02-16    TPro  6.1  /  Alb  3.0[L]  /  TBili  0.3  /  DBili  x   /  AST  63[H]  /  ALT  43  /  AlkPhos  132[H]  02-17    PT/INR - ( 16 Feb 2025 09:39 )   PT: 11.2 sec;   INR: 0.98 ratio         PTT - ( 16 Feb 2025 09:39 )  PTT:30.3 sec  Bilirubin Total: 0.3 mg/dL (02-17-25 @ 05:06)   [facility] HEMATOLOGY/ONCOLOGY CONSULT NOTE     HPI:  Mr. Gr is a 69 year old male with PMHx of seizure disorder, sleep apnea, HTN, chronic idiopathic constipation, stage III CKD, severe obesity, secondary hyperparathyroidism, anemia, thrombocytopenia, hyperlipidemia, testicular cancer under treatment with Dr. Ovalles who is admitted for acute hypoxic respiratory failure secondary to COVID vs superimposed pneumonia with new onset thrombocytopenia. He was recently discharged 02/06/2025 after a tenous stay including intubation in the ICU for respiratory failure in setting of seizure. He had recovered and was discharged to rehab.      Former smoker, quit 14y prior, denied ETOH, drug use. Lives at home. Does not have children. Denies family history of blood disorders or malignancy.  Denies previous workplace related exposures.  Denies previous history of blood transfusions.  Denied history of thromboses.  Denied history of  requiring anticoagulation.     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  Margins were negative.  pT3Nx. CT C/A/P with few left para-aortic and left iliac chain lymph nodes largest measuring 8.1 cm left para-aortic node, bilateral subcentimeter noncalcified pulmonary nodules measuring up to 7 mm. AFP, LDH, hCG were all elevated. 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 Started on adjuvant therapy with Cisplatin+Etoposide, so far completed 4 cycles of treatment with cisplatin dose reduced 50% and Etoposide 50% due to thrombocytopenia.    ROS: pertinent positives and negatives as per HPI    Allergies: seasonal allergies (Unknown)  penicillin (Hives)    PMHx:  Hypertension, unspecified type  Other hyperlipidemia  History of epilepsy  Paranoid schizophrenia  Obstructive sleep apnea  Testicular cancer    SurgHx:   H/O Spinal surgery    Meds:   MEDICATIONS  (STANDING):  acetylcysteine 20%  Inhalation 3 milliLiter(s) Inhalation every 6 hours  albuterol/ipratropium for Nebulization 3 milliLiter(s) Nebulizer every 6 hours  atorvastatin 20 milliGRAM(s) Oral at bedtime  cholecalciferol 1000 Unit(s) Oral daily  dexAMETHasone  Injectable 6 milliGRAM(s) IV Push daily  escitalopram 15 milliGRAM(s) Oral with breakfast  gabapentin Solution 150 milliGRAM(s) Oral every 12 hours  heparin   Injectable 5000 Unit(s) SubCutaneous every 8 hours  influenza  Vaccine (HIGH DOSE) 0.5 milliLiter(s) IntraMuscular once  lacosamide 100 milliGRAM(s) Oral two times a day  lamoTRIgine 100 milliGRAM(s) Oral two times a day  levETIRAcetam 750 milliGRAM(s) Oral two times a day  lurasidone 40 milliGRAM(s) Oral at bedtime  pantoprazole    Tablet 40 milliGRAM(s) Oral before breakfast  piperacillin/tazobactam IVPB.. 4.5 Gram(s) IV Intermittent every 12 hours  polyethylene glycol 3350 17 Gram(s) Oral daily  senna 2 Tablet(s) Oral at bedtime  vancomycin  IVPB 750 milliGRAM(s) IV Intermittent every 24 hours    MEDICATIONS  (PRN):  acetaminophen     Tablet .. 650 milliGRAM(s) Oral every 6 hours PRN Temp greater or equal to 38C (100.4F), Mild Pain (1 - 3)  aluminum hydroxide/magnesium hydroxide/simethicone Suspension 30 milliLiter(s) Oral every 4 hours PRN Dyspepsia  melatonin 3 milliGRAM(s) Oral at bedtime PRN Insomnia  ondansetron Injectable 4 milliGRAM(s) IV Push every 8 hours PRN Nausea and/or Vomiting    Vital Signs  T(C): 37.4 (02-18-25 @ 04:00), Max: 37.4 (02-18-25 @ 04:00)  T(F): 99.3 (02-18-25 @ 04:00), Max: 99.3 (02-18-25 @ 04:00)  HR: 76 (02-18-25 @ 04:00) (64 - 88)  BP: 134/83 (02-18-25 @ 04:00) (110/66 - 136/82)  RR: 17 (02-18-25 @ 04:00) (17 - 20)  SpO2: 99% (02-18-25 @ 04:00) (96% - 99%)    Physical Exam:  Gen:   HEENT:   Chest:   Cardiac:  Abd:   Ext:   Neuro:   Integument:     Labs:                        9.3    6.65  )-----------( 94       ( 18 Feb 2025 05:02 )             30.1     CBC Full  -  ( 17 Feb 2025 05:06 )  WBC Count : 4.97 K/uL  RBC Count : 2.62 M/uL  Hemoglobin : 8.3 g/dL  Hematocrit : 26.5 %  Platelet Count - Automated : 76 K/uL  Mean Cell Volume : 101.1 fl  Mean Cell Hemoglobin : 31.7 pg  Mean Cell Hemoglobin Concentration : 31.3 g/dL    02-17    140  |  108  |  53[H]  ----------------------------<  168[H]  5.3   |  19[L]  |  3.27[H]    Ca    8.9      17 Feb 2025 05:06  Mg     2.1     02-16    TPro  6.1  /  Alb  3.0[L]  /  TBili  0.3  /  DBili  x   /  AST  63[H]  /  ALT  43  /  AlkPhos  132[H]  02-17    PT/INR - ( 16 Feb 2025 09:39 )   PT: 11.2 sec;   INR: 0.98 ratio         PTT - ( 16 Feb 2025 09:39 )  PTT:30.3 sec  Bilirubin Total: 0.3 mg/dL (02-17-25 @ 05:06)   [facility] HEMATOLOGY/ONCOLOGY CONSULT NOTE     HPI:  Mr. Gr is a 69 year old male with PMHx of seizure disorder, sleep apnea, HTN, chronic idiopathic constipation, stage III CKD, severe obesity, secondary hyperparathyroidism, anemia, thrombocytopenia, hyperlipidemia, testicular cancer under treatment with Dr. Ovalles who is admitted for acute hypoxic respiratory failure secondary to COVID vs superimposed pneumonia with new onset thrombocytopenia. He was recently discharged 02/06/2025 after a tenous stay including intubation in the ICU for respiratory failure in setting of seizure. He had recovered and was discharged to rehab.      Former smoker, quit 14y prior, denied ETOH, drug use. Lives at home. Does not have children. Denies family history of blood disorders or malignancy.  Denies previous workplace related exposures.  Denies previous history of blood transfusions.  Denied history of thromboses.  Denied history of  requiring anticoagulation.     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  Margins were negative.  pT3Nx. CT C/A/P with few left para-aortic and left iliac chain lymph nodes largest measuring 8.1 cm left para-aortic node, bilateral subcentimeter noncalcified pulmonary nodules measuring up to 7 mm. AFP, LDH, hCG were all elevated. 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 Started on adjuvant therapy with Cisplatin+Etoposide, so far completed 4 cycles of treatment with cisplatin dose reduced 50% and Etoposide 50% due to thrombocytopenia.    ROS: pertinent positives and negatives as per HPI    Allergies: seasonal allergies (Unknown)  penicillin (Hives)    PMHx:  Hypertension, unspecified type  Other hyperlipidemia  History of epilepsy  Paranoid schizophrenia  Obstructive sleep apnea  Testicular cancer    SurgHx:   H/O Spinal surgery    Meds:   MEDICATIONS  (STANDING):  acetylcysteine 20%  Inhalation 3 milliLiter(s) Inhalation every 6 hours  albuterol/ipratropium for Nebulization 3 milliLiter(s) Nebulizer every 6 hours  atorvastatin 20 milliGRAM(s) Oral at bedtime  cholecalciferol 1000 Unit(s) Oral daily  dexAMETHasone  Injectable 6 milliGRAM(s) IV Push daily  escitalopram 15 milliGRAM(s) Oral with breakfast  gabapentin Solution 150 milliGRAM(s) Oral every 12 hours  heparin   Injectable 5000 Unit(s) SubCutaneous every 8 hours  influenza  Vaccine (HIGH DOSE) 0.5 milliLiter(s) IntraMuscular once  lacosamide 100 milliGRAM(s) Oral two times a day  lamoTRIgine 100 milliGRAM(s) Oral two times a day  levETIRAcetam 750 milliGRAM(s) Oral two times a day  lurasidone 40 milliGRAM(s) Oral at bedtime  pantoprazole    Tablet 40 milliGRAM(s) Oral before breakfast  piperacillin/tazobactam IVPB.. 4.5 Gram(s) IV Intermittent every 12 hours  polyethylene glycol 3350 17 Gram(s) Oral daily  senna 2 Tablet(s) Oral at bedtime  vancomycin  IVPB 750 milliGRAM(s) IV Intermittent every 24 hours    MEDICATIONS  (PRN):  acetaminophen     Tablet .. 650 milliGRAM(s) Oral every 6 hours PRN Temp greater or equal to 38C (100.4F), Mild Pain (1 - 3)  aluminum hydroxide/magnesium hydroxide/simethicone Suspension 30 milliLiter(s) Oral every 4 hours PRN Dyspepsia  melatonin 3 milliGRAM(s) Oral at bedtime PRN Insomnia  ondansetron Injectable 4 milliGRAM(s) IV Push every 8 hours PRN Nausea and/or Vomiting    Vital Signs  T(C): 37.4 (02-18-25 @ 04:00), Max: 37.4 (02-18-25 @ 04:00)  T(F): 99.3 (02-18-25 @ 04:00), Max: 99.3 (02-18-25 @ 04:00)  HR: 76 (02-18-25 @ 04:00) (64 - 88)  BP: 134/83 (02-18-25 @ 04:00) (110/66 - 136/82)  RR: 17 (02-18-25 @ 04:00) (17 - 20)  SpO2: 99% (02-18-25 @ 04:00) (96% - 99%)    Physical Exam:  Gen: NAD  HEENT: EOMI, MMM  Chest: equal chest rise  Cardiac: regular   Abd: non distended   Neuro: AAOx3     Labs:                        9.3    6.65  )-----------( 94       ( 18 Feb 2025 05:02 )             30.1     CBC Full  -  ( 17 Feb 2025 05:06 )  WBC Count : 4.97 K/uL  RBC Count : 2.62 M/uL  Hemoglobin : 8.3 g/dL  Hematocrit : 26.5 %  Platelet Count - Automated : 76 K/uL  Mean Cell Volume : 101.1 fl  Mean Cell Hemoglobin : 31.7 pg  Mean Cell Hemoglobin Concentration : 31.3 g/dL    02-17    140  |  108  |  53[H]  ----------------------------<  168[H]  5.3   |  19[L]  |  3.27[H]    Ca    8.9      17 Feb 2025 05:06  Mg     2.1     02-16    TPro  6.1  /  Alb  3.0[L]  /  TBili  0.3  /  DBili  x   /  AST  63[H]  /  ALT  43  /  AlkPhos  132[H]  02-17    PT/INR - ( 16 Feb 2025 09:39 )   PT: 11.2 sec;   INR: 0.98 ratio         PTT - ( 16 Feb 2025 09:39 )  PTT:30.3 sec  Bilirubin Total: 0.3 mg/dL (02-17-25 @ 05:06)

## 2025-02-18 NOTE — CONSULT NOTE ADULT - ASSESSMENT
Mr. Gr is a 69 year old male with PMHx of seizure disorder, sleep apnea, HTN, chronic idiopathic constipation, stage III CKD, severe obesity, secondary hyperparathyroidism, anemia, thrombocytopenia, hyperlipidemia, testicular cancer under treatment with Dr. Ovalles who is admitted for hypoxic respiratory failure secondary to COVID.     Former smoker, quit 14y prior, denied ETOH, drug use. Lives at home. Does not have children. Denies family history of blood disorders or malignancy.  Denies previous workplace related exposures.  Denies previous history of blood transfusions.  Denied history of thromboses.  Denied history of  requiring anticoagulation.     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  Margins were negative.  pT3Nx. CT C/A/P with few left para-aortic and left iliac chain lymph nodes largest measuring 8.1 cm left para-aortic node, bilateral subcentimeter noncalcified pulmonary nodules measuring up to 7 mm. AFP, LDH, hCG were all elevated. 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 Started on adjuvant therapy with Cisplatin+Etoposide, so far completed 4 cycles of treatment with cisplatin dose reduced 50% and Etoposide 50% due to thrombocytopenia.    #Acute hypoxic respiratory failure  # COVID    # Brain lesion  - pt with hx of seizures  - MRI brain now with 5.4 mm enhancing lesion in the LEFT middle cerebellar peduncle with associated edema suspicious for metastases  - MRI Lumbar negative for acute disease  - Small lesion without mass effect or edema, recommended to correlate per neurology if foci and locus are concordant with seizure activity  - Neurology recs noted, new lesion not likely to cause seizure  - It is rare, but nonetheless not impossible, for testicular cancer to be metastatic to the brain  - He will need another PET-CT, can be completed outpatient once stable if concern can proceed with biopsy at that time   - Endocrinology eval for thyroid nodule  - AFP/BHCG normal,      # 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 Ovalles 12/03/2024, markers continued to be negative  - See above in regards to brain lesion  - MRI Neck shows 4.2 cm RIGHT upper lobe mass/infiltrate  - Recommend IR guided biopsy of RUL mass if PET-CT concordant/suggestive of malignancy, PET-CT as outpt and then consideration after better characterization   - Repeat CT chest w/o contrast noted with new secretions at the level of the bronchus intermedius with complete right middle/lower bilobar consolidation/collapse and new scattered bilateral upper lobe groundglass opacities, concerning for infection  - Discussed with pts wife and discussed with primary Oncologist Dr. Ovalles, agree with current plan  - Follow up as outpatient with Franki Ovalles MD     # Thrombocytopenia  - Labile  - Coags wnl, LFTs elevated  - Continue to trend  - Transfuse to maintain >10k, if actively bleeding then maintain >50k     # Acute kidney injury on CKD Stage IIIA  - Improving  - Likely multifactorial at this point  - Nephrology consult and recs noted  - Continue to trend     # Leukocytosis, Neutrophilia  - Likely reactive, resolved  - Continue to trend     # Normocytic anemia  - Chronic, has hx of PRBC during chemo 07/2024  - Noted Anti E, Anti-K Anti-C antibodies previously  - Monitor for bleeding  - Recommend to transfuse to maintain Hb > 7    Thank you for allowing me to participate in the care of this patient, please do  not hesitate to call or text me if you have further questions or concerns.     Brayan Mart MD  [facility] [place]   Division of Hematology/Oncology  [address] [place] [address], [address]  [place] [address]  P: [phone]  F: [phone]    Attestation:   Total time spent on the encounter: >** minutes   ----Including ** min face-to-face interaction in addition to chart review, reviewing treatment plan, and managing the patient’s chronic diagnoses as listed in the assessment----     1.	I have reviewed, analyzed and interpreted the following labs: CBC, CMP, imaging:  CT C/A/P and Head impressions as above.   2.	I have reviewed notes stating pts current admission, consultants and follow ups.   3.	I have reviewed the past medical, family, and surgical history and confirmed with outpatient records   Mr. Gr is a 69 year old male with PMHx of seizure disorder, sleep apnea, HTN, chronic idiopathic constipation, stage III CKD, severe obesity, secondary hyperparathyroidism, anemia, thrombocytopenia, hyperlipidemia, testicular cancer under treatment with Dr. Ovalles who is admitted for acute hypoxic respiratory failure secondary to COVID vs superimposed pneumonia with new onset thrombocytopenia. He was recently discharged 02/06/2025 after a tenous stay including intubation in the ICU for respiratory failure in setting of seizure. He had recovered and was discharged to rehab.      Former smoker, quit 14y prior, denied ETOH, drug use. Lives at home. Does not have children. Denies family history of blood disorders or malignancy.  Denies previous workplace related exposures.  Denies previous history of blood transfusions.  Denied history of thromboses.  Denied history of  requiring anticoagulation.     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  Margins were negative.  pT3Nx. CT C/A/P with few left para-aortic and left iliac chain lymph nodes largest measuring 8.1 cm left para-aortic node, bilateral subcentimeter noncalcified pulmonary nodules measuring up to 7 mm. AFP, LDH, hCG were all elevated. 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 Started on adjuvant therapy with Cisplatin+Etoposide, so far completed 4 cycles of treatment with cisplatin dose reduced 50% and Etoposide 50% due to thrombocytopenia.    #Acute hypoxic respiratory failure  # COVID  - CT noted with bilateral GGO  - ID following  - Pulmonary following  - Antibiotics per primary team and ID     # Thrombocytopenia   - Did occur during most recent admission and improved to normal prior to discharge   - Without signs of bleeding  - Could be multifactorial, possibly due to infection   - Continue to trend  - Transfuse to maintain Plt > 10k unless actively bleeding then maintain > 50k     # Brain lesion  - pt with hx of seizures  - MRI brain now with 5.4 mm enhancing lesion in the LEFT middle cerebellar peduncle with associated edema suspicious for metastases  - MRI Lumbar negative for acute disease  - Small lesion without mass effect or edema, recommended to correlate per neurology if foci and locus are concordant with seizure activity  - Neurology recs noted, new lesion not likely to cause seizure  - It is rare, but nonetheless not impossible, for testicular cancer to be metastatic to the brain  - He will need another PET-CT, can be completed outpatient once stable if concern can proceed with biopsy at that time   - Previous endocrinology eval for thyroid nodule noted  - AFP/BHCH normal,  previously      # 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 Ovalles, agree with current plan  - Follow up as outpatient with Franki Ovalles MD     # Acute kidney injury on CKD Stage IIIA  - Likely multifactorial  - Nephrology consult and recs noted  - Continue to trend     # Normocytic anemia  - Chronic, has hx of PRBC during chemo 07/2024  - Noted Anti E, Anti-K Anti-C antibodies previously  - Monitor for bleeding  - Recommend to transfuse to maintain Hb > 7        Recommendations  - Trend with CBC daily   - Transfuse to maintain Hb > 7   - Transfuse to maintain Plt >10k unless active bleeding then >50k   - Monitor renal function  - Antibiotics per primary team and ID   - Outpatient follow up with Dr. Franki Ovalles         Thank you for allowing me to participate in the care of Mr. Gr please do not hesitate to call or text me if you have further questions or concerns.     Brayan Mart MD  [facility] [place]   Division of Hematology/Oncology  [address] [place] [address], [address]  [place] [address]  P: [phone]  F: [phone]    Attestation:   Total time spent on the encounter: >60 minutes   ----Including face-to-face interaction in addition to chart review, reviewing treatment plan, and managing the patient’s chronic diagnoses as listed in the assessment----     1.	I have reviewed, analyzed and interpreted the following labs: CBC, CMP, imaging:  CT Chest impressions as above.   2.	I have reviewed notes stating pts current admission, consultants and follow ups.   3.	I have reviewed the past medical, family, and surgical history and confirmed with outpatient records

## 2025-02-18 NOTE — PROGRESS NOTE ADULT - ASSESSMENT
69 year old male with a past medical history of hypertension, hyperlipemia VAZQUEZ (on CPAP at bedtime, NC 2 liters during the day), CKD stage 3, epilepsy, schizophrenia, developmental delay, metastatic testicular cancer (s/p orchiectomy, actively on chemotherapy, last dose of etoposide/cisplatin on 6/2024), presenting with acute on chronic hypoxemic respiratory failure, secondary to (a) COVID-19 infection, (b) superimposed pneumonia after recent influenza infection, and/or (c) fluid overload.

## 2025-02-18 NOTE — PROGRESS NOTE ADULT - PROBLEM SELECTOR PLAN 3
-CT chest with b/l GGO, new since 1/30/25. Likely COVID PNA +/- superimposed bacterial PNA  -Continue ABX.

## 2025-02-18 NOTE — CHART NOTE - NSCHARTNOTEFT_GEN_A_CORE
Called pt's sister Ester regarding clinical status of pt needing to be placed on AVAPS machine due to worsening respiratory status as he was with sats in high  80s while on 6L NC. Discussed with Pulm, ID, and attenfind Dr. Cook; use of remdesivir, and all agreed. Discussed also with Dr. Mart and recommended to reach out to pt's sister has she did not want  pt placed on it. Called pt's sister, and endorsed due to the pt's current CKD, she did not want it to get worse, and requested provider speak to renal. Discussed also with Dr. Morris, and also agreed with starting Remdesivir as pt respiratory status was becoming compromised and benefit's outweighed risks. Dr. Morris also discussed with pt 's sister to address concerns of remdesivir. Called back pt's sister to confirm that she was amendable to start remdesivir and she agreed. She also asked if Ivermectin can be started in placed of remdesisvir; explained to pt as per d/w with ID that there is no benefit of this medication helping pt. Pt's sister requested call from ID. Reached out to Dr. Mart, who will call pt's sister to clarify further concerns.  Kari FARMER Called pt's sister Ester regarding clinical status of pt needing to be placed on AVAPS machine due to worsening respiratory status as he was with sats in high  80s while on 6L NC. Discussed with Pulm, ID, and attending Dr. Cook; use of remdesivir, and all agreed. Discussed also with Dr. Mart and recommended to reach out to pt's sister has she did not want  pt placed on it. Called pt's sister, and endorsed due to the pt's current CKD, she did not want it to get worse, and requested provider speak to renal. Discussed also with Dr. Morris, and also agreed with starting Remdesivir as pt respiratory status was becoming compromised and benefit's outweighed risks. Dr. Morris also discussed with pt 's sister to address concerns of remdesivir. Called back pt's sister to confirm that she was amendable to start remdesivir and she agreed. She also asked if Ivermectin can be started in placed of remdesisvir; explained to pt as per d/w with ID that there is no benefit of this medication helping pt. Pt's sister requested call from ID. Reached out to Dr. Mart, who will call pt's sister to clarify further concerns.  Kari FARMER

## 2025-02-18 NOTE — PROGRESS NOTE ADULT - ASSESSMENT
Patient is a 69 year old male with PMH of HTN, HLD, VAZQUEZ (on CPAP at bedtime, NC 2 liters during the day), CKD3, epilepsy, schizophrenia, developmental delay, metastatic testicular cancer (s/p orchiectomy, actively on chemotherapy, last dose of etoposide/cisplatin on 6/2024) presenting with worsening shortness of breath. Patient was recently hospitalized at [facility] from January 27th 2025 to February 6th 2025 for seizure and influenza virus infection, which required intubation. He was sent to rehab (originally from home) from hospital and now returns due to sudden onset shortness of breath and worsening oxygenation. Of note, he tested positive for COVID-19 at facility on 2/13/25 and was not put on any COVID-19 treatment at the time, only guaifenesin and scopolamine patch. Patient was placed on non-rebreather and sent to the hospital on 2/16/25.     Acute on chronic hypoxic respiratory failure  COVID-19 pneumonia, possible superimposed bacterial pneumonia   Fever likely due to above   SHAGUFTA on CKD  - noted discussion with patient/family and decided to hold off on remdesivir given LFTs and SHAGUFTA  - CXR with likely congestive changes, possible superimposed focal infiltrate in RUL, no effusion  - CT chest with extensive b/l ggo majority new since 1/30/25-c/w COVID pneumonia; tracheomalacia   - PCT likely not helpful given patient with renal dysfunction   - Legionella urine ag negative   - MRSA PCR screen negative  - Ucx noted, pt with no gu symptoms   - afebrile now, WBC wnl, on NC     Recommendations:   Follow Bcx - NGTD x 2   Follow Scx - gram stain noted   Follow Strep pneumoniae urine ag   Continue on zosyn (renally dosed)  Discontinued vancomycin   Pulmonary following, on dexamethasone   Supplemental O2 as needed, wean as able   Supportive care  Aspiration precautions  Continue rest of care per primary team       Greyson Mart M.D.  [facility]  Available on [employer] TEAMS - *PREFERRED*  [phone]  After 5pm on weekdays and all day on weekends - please call [phone]     Thank you for consulting us and involving us in the management of this patients case. In addition to reviewing history, imaging, documents, labs, microbiology, took into account antibiotic stewardship, local antibiogram and infection control strategies and potential transmission issues.

## 2025-02-18 NOTE — PROGRESS NOTE ADULT - PROBLEM SELECTOR PLAN 4
TTE done here 1/17/25 technically difficult, but LV systolic fxn appears nl without any regional wall motion abnormalities  - BNP worsened this admission, but his symptoms are primarily due to COVID-19/bacterial PNA  - Strict I/Os  - Daily weights  - Repeat TTE pending  - Will hold off diuretics for now due to ongoing infection and SHAGUFTA  - Appreciate cardiology

## 2025-02-18 NOTE — PROGRESS NOTE ADULT - PROBLEM SELECTOR PLAN 6
Unclear cause. Also with anemia. May be related to metastatic cancer that may have gone to bone marrow. Not present in the past.   - Monitor for improvement daily  - Appreciate heme/onc

## 2025-02-18 NOTE — PROGRESS NOTE ADULT - PROBLEM SELECTOR PLAN 7
Continue with home atorvastatin 20 mg bedtime for HLD, vitamin D supplementation, pantoprazole 40 mg daily, senna 2 tab bedtime, and Miralax 17 gram daily.     General  - DVT prophylaxis: heparin 5000 units q8h   - Diet: regular   - Medication reconciliation: complete   - Code: Full   - Medications: Tylenol 650 mg q6h as needed for pain, Maalox 30 mL q4h as needed for acid reflux, melatonin 3 mg bedtime as needed for insomnia, Zofran 4 mg IV q8h as needed for nausea/vomiting     Disposition  - Needed before discharge: off BIPAP for 24 hours   - Anticipated discharge date: acute   - Discharge to: has apartment in [place] with aide but was recently at SNF for rehab   - Appointments after discharge: PCP    2/16/25 --> plan was discussed with patient's brother Fernando (over the phone, [phone]) in detail. He agrees with plan. All questions answered. He asked that I update Ester (sister, [phone]); she was called and updated as well, also agrees with the plan, also answered all questions.

## 2025-02-18 NOTE — PROGRESS NOTE ADULT - PROBLEM SELECTOR PLAN 3
Has a history of CKD stage 3, Cr 2.38 at baseline. Presents with Cr 3.27. Likely pre-renal.   - Renal US pending   - Fe Urea = 29%  - Monitor Cr daily   - Avoid nephrotoxins, dose medication to GFR  - Appreciate nephrology

## 2025-02-19 DIAGNOSIS — I48.91 UNSPECIFIED ATRIAL FIBRILLATION: ICD-10-CM

## 2025-02-19 LAB
ALBUMIN SERPL ELPH-MCNC: 2.1 G/DL — LOW (ref 3.3–5)
ALBUMIN SERPL ELPH-MCNC: 2.4 G/DL — LOW (ref 3.3–5)
ALBUMIN SERPL ELPH-MCNC: 3.1 G/DL — LOW (ref 3.3–5)
ALP SERPL-CCNC: 134 U/L — HIGH (ref 40–120)
ALP SERPL-CCNC: 92 U/L — SIGNIFICANT CHANGE UP (ref 40–120)
ALP SERPL-CCNC: 99 U/L — SIGNIFICANT CHANGE UP (ref 40–120)
ALT FLD-CCNC: 27 U/L — SIGNIFICANT CHANGE UP (ref 10–45)
ALT FLD-CCNC: 31 U/L — SIGNIFICANT CHANGE UP (ref 10–45)
ALT FLD-CCNC: 38 U/L — SIGNIFICANT CHANGE UP (ref 10–45)
AMORPH SED URNS QL MICRO: PRESENT
ANION GAP SERPL CALC-SCNC: 14 MMOL/L — SIGNIFICANT CHANGE UP (ref 5–17)
ANION GAP SERPL CALC-SCNC: 15 MMOL/L — SIGNIFICANT CHANGE UP (ref 5–17)
APPEARANCE UR: CLEAR — SIGNIFICANT CHANGE UP
APTT BLD: 139.2 SEC — CRITICAL HIGH (ref 24.5–35.6)
APTT BLD: 37.7 SEC — HIGH (ref 24.5–35.6)
AST SERPL-CCNC: 50 U/L — HIGH (ref 10–40)
AST SERPL-CCNC: 55 U/L — HIGH (ref 10–40)
AST SERPL-CCNC: 71 U/L — HIGH (ref 10–40)
BACTERIA # UR AUTO: NEGATIVE /HPF — SIGNIFICANT CHANGE UP
BILIRUB DIRECT SERPL-MCNC: 0.2 MG/DL — SIGNIFICANT CHANGE UP (ref 0–0.3)
BILIRUB INDIRECT FLD-MCNC: 0.1 MG/DL — LOW (ref 0.2–1)
BILIRUB SERPL-MCNC: 0.3 MG/DL — SIGNIFICANT CHANGE UP (ref 0.2–1.2)
BILIRUB SERPL-MCNC: 0.4 MG/DL — SIGNIFICANT CHANGE UP (ref 0.2–1.2)
BILIRUB SERPL-MCNC: 0.6 MG/DL — SIGNIFICANT CHANGE UP (ref 0.2–1.2)
BILIRUB UR-MCNC: NEGATIVE — SIGNIFICANT CHANGE UP
BUN SERPL-MCNC: 42 MG/DL — HIGH (ref 7–23)
BUN SERPL-MCNC: 54 MG/DL — HIGH (ref 7–23)
CALCIUM SERPL-MCNC: 7 MG/DL — LOW (ref 8.4–10.5)
CALCIUM SERPL-MCNC: 9.3 MG/DL — SIGNIFICANT CHANGE UP (ref 8.4–10.5)
CAST: 0 /LPF — SIGNIFICANT CHANGE UP (ref 0–4)
CHLORIDE SERPL-SCNC: 107 MMOL/L — SIGNIFICANT CHANGE UP (ref 96–108)
CHLORIDE SERPL-SCNC: 88 MMOL/L — LOW (ref 96–108)
CO2 SERPL-SCNC: 15 MMOL/L — LOW (ref 22–31)
CO2 SERPL-SCNC: 21 MMOL/L — LOW (ref 22–31)
COLOR SPEC: YELLOW — SIGNIFICANT CHANGE UP
CREAT SERPL-MCNC: 1.95 MG/DL — HIGH (ref 0.5–1.3)
CREAT SERPL-MCNC: 2.09 MG/DL — HIGH (ref 0.5–1.3)
CREAT SERPL-MCNC: 2.64 MG/DL — HIGH (ref 0.5–1.3)
CULTURE RESULTS: ABNORMAL
DIFF PNL FLD: ABNORMAL
EGFR: 26 ML/MIN/1.73M2 — LOW
EGFR: 26 ML/MIN/1.73M2 — LOW
EGFR: 34 ML/MIN/1.73M2 — LOW
EGFR: 34 ML/MIN/1.73M2 — LOW
EGFR: 37 ML/MIN/1.73M2 — LOW
EGFR: 37 ML/MIN/1.73M2 — LOW
GAS PNL BLDV: SIGNIFICANT CHANGE UP
GAS PNL BLDV: SIGNIFICANT CHANGE UP
GLUCOSE SERPL-MCNC: 121 MG/DL — HIGH (ref 70–99)
GLUCOSE SERPL-MCNC: 818 MG/DL — CRITICAL HIGH (ref 70–99)
GLUCOSE UR QL: NEGATIVE MG/DL — SIGNIFICANT CHANGE UP
HCT VFR BLD CALC: 23.9 % — LOW (ref 39–50)
HCT VFR BLD CALC: 27.2 % — LOW (ref 39–50)
HCT VFR BLD CALC: 27.7 % — LOW (ref 39–50)
HGB BLD-MCNC: 7.2 G/DL — LOW (ref 13–17)
HGB BLD-MCNC: 8.6 G/DL — LOW (ref 13–17)
HGB BLD-MCNC: 8.7 G/DL — LOW (ref 13–17)
INR BLD: 1.07 RATIO — SIGNIFICANT CHANGE UP (ref 0.85–1.16)
KETONES UR-MCNC: NEGATIVE MG/DL — SIGNIFICANT CHANGE UP
LEUKOCYTE ESTERASE UR-ACNC: NEGATIVE — SIGNIFICANT CHANGE UP
MAGNESIUM SERPL-MCNC: 1.5 MG/DL — LOW (ref 1.6–2.6)
MAGNESIUM SERPL-MCNC: 2.3 MG/DL — SIGNIFICANT CHANGE UP (ref 1.6–2.6)
MCHC RBC-ENTMCNC: 30.1 G/DL — LOW (ref 32–36)
MCHC RBC-ENTMCNC: 30.9 PG — SIGNIFICANT CHANGE UP (ref 27–34)
MCHC RBC-ENTMCNC: 30.9 PG — SIGNIFICANT CHANGE UP (ref 27–34)
MCHC RBC-ENTMCNC: 31.4 G/DL — LOW (ref 32–36)
MCHC RBC-ENTMCNC: 31.6 G/DL — LOW (ref 32–36)
MCHC RBC-ENTMCNC: 31.6 PG — SIGNIFICANT CHANGE UP (ref 27–34)
MCV RBC AUTO: 104.8 FL — HIGH (ref 80–100)
MCV RBC AUTO: 97.8 FL — SIGNIFICANT CHANGE UP (ref 80–100)
MCV RBC AUTO: 98.2 FL — SIGNIFICANT CHANGE UP (ref 80–100)
NITRITE UR-MCNC: NEGATIVE — SIGNIFICANT CHANGE UP
NRBC BLD AUTO-RTO: 0 /100 WBCS — SIGNIFICANT CHANGE UP (ref 0–0)
PH UR: 5.5 — SIGNIFICANT CHANGE UP (ref 5–8)
PHOSPHATE SERPL-MCNC: 2.6 MG/DL — SIGNIFICANT CHANGE UP (ref 2.5–4.5)
PLATELET # BLD AUTO: 79 K/UL — LOW (ref 150–400)
PLATELET # BLD AUTO: 84 K/UL — LOW (ref 150–400)
PLATELET # BLD AUTO: 95 K/UL — LOW (ref 150–400)
POTASSIUM SERPL-MCNC: 3.9 MMOL/L — SIGNIFICANT CHANGE UP (ref 3.5–5.3)
POTASSIUM SERPL-MCNC: 5 MMOL/L — SIGNIFICANT CHANGE UP (ref 3.5–5.3)
POTASSIUM SERPL-SCNC: 3.9 MMOL/L — SIGNIFICANT CHANGE UP (ref 3.5–5.3)
POTASSIUM SERPL-SCNC: 5 MMOL/L — SIGNIFICANT CHANGE UP (ref 3.5–5.3)
PROT SERPL-MCNC: 4.3 G/DL — LOW (ref 6–8.3)
PROT SERPL-MCNC: 4.7 G/DL — LOW (ref 6–8.3)
PROT SERPL-MCNC: 6.3 G/DL — SIGNIFICANT CHANGE UP (ref 6–8.3)
PROT UR-MCNC: 300 MG/DL
PROTHROM AB SERPL-ACNC: 12.2 SEC — SIGNIFICANT CHANGE UP (ref 9.9–13.4)
RBC # BLD: 2.28 M/UL — LOW (ref 4.2–5.8)
RBC # BLD: 2.78 M/UL — LOW (ref 4.2–5.8)
RBC # BLD: 2.82 M/UL — LOW (ref 4.2–5.8)
RBC # FLD: 14.6 % — HIGH (ref 10.3–14.5)
RBC # FLD: 14.8 % — HIGH (ref 10.3–14.5)
RBC # FLD: 15 % — HIGH (ref 10.3–14.5)
RBC CASTS # UR COMP ASSIST: 1 /HPF — SIGNIFICANT CHANGE UP (ref 0–4)
REVIEW: SIGNIFICANT CHANGE UP
SODIUM SERPL-SCNC: 117 MMOL/L — CRITICAL LOW (ref 135–145)
SODIUM SERPL-SCNC: 143 MMOL/L — SIGNIFICANT CHANGE UP (ref 135–145)
SP GR SPEC: 1.02 — SIGNIFICANT CHANGE UP (ref 1–1.03)
SPECIMEN SOURCE: SIGNIFICANT CHANGE UP
SQUAMOUS # UR AUTO: 1 /HPF — SIGNIFICANT CHANGE UP (ref 0–5)
UROBILINOGEN FLD QL: 0.2 MG/DL — SIGNIFICANT CHANGE UP (ref 0.2–1)
WBC # BLD: 5.67 K/UL — SIGNIFICANT CHANGE UP (ref 3.8–10.5)
WBC # BLD: 5.83 K/UL — SIGNIFICANT CHANGE UP (ref 3.8–10.5)
WBC # BLD: 6.09 K/UL — SIGNIFICANT CHANGE UP (ref 3.8–10.5)
WBC # FLD AUTO: 5.67 K/UL — SIGNIFICANT CHANGE UP (ref 3.8–10.5)
WBC # FLD AUTO: 5.83 K/UL — SIGNIFICANT CHANGE UP (ref 3.8–10.5)
WBC # FLD AUTO: 6.09 K/UL — SIGNIFICANT CHANGE UP (ref 3.8–10.5)
WBC UR QL: 1 /HPF — SIGNIFICANT CHANGE UP (ref 0–5)

## 2025-02-19 PROCEDURE — 93010 ELECTROCARDIOGRAM REPORT: CPT

## 2025-02-19 PROCEDURE — 71045 X-RAY EXAM CHEST 1 VIEW: CPT | Mod: 26

## 2025-02-19 RX ORDER — METOPROLOL SUCCINATE 50 MG/1
5 TABLET, EXTENDED RELEASE ORAL EVERY 6 HOURS
Refills: 0 | Status: DISCONTINUED | OUTPATIENT
Start: 2025-02-19 | End: 2025-02-20

## 2025-02-19 RX ORDER — ACETAMINOPHEN 500 MG/5ML
1000 LIQUID (ML) ORAL ONCE
Refills: 0 | Status: COMPLETED | OUTPATIENT
Start: 2025-02-19 | End: 2025-02-19

## 2025-02-19 RX ORDER — PIPERACILLIN-TAZO-DEXTROSE,ISO 3.375G/5
3.38 IV SOLUTION, PIGGYBACK PREMIX FROZEN(ML) INTRAVENOUS EVERY 8 HOURS
Refills: 0 | Status: COMPLETED | OUTPATIENT
Start: 2025-02-19 | End: 2025-02-26

## 2025-02-19 RX ORDER — HEPARIN SODIUM 1000 [USP'U]/ML
7500 INJECTION INTRAVENOUS; SUBCUTANEOUS EVERY 6 HOURS
Refills: 0 | Status: DISCONTINUED | OUTPATIENT
Start: 2025-02-19 | End: 2025-02-23

## 2025-02-19 RX ORDER — HEPARIN SODIUM 1000 [USP'U]/ML
3500 INJECTION INTRAVENOUS; SUBCUTANEOUS EVERY 6 HOURS
Refills: 0 | Status: DISCONTINUED | OUTPATIENT
Start: 2025-02-19 | End: 2025-02-23

## 2025-02-19 RX ORDER — HEPARIN SODIUM 1000 [USP'U]/ML
INJECTION INTRAVENOUS; SUBCUTANEOUS
Qty: 25000 | Refills: 0 | Status: DISCONTINUED | OUTPATIENT
Start: 2025-02-19 | End: 2025-02-23

## 2025-02-19 RX ADMIN — HEPARIN SODIUM 5000 UNIT(S): 1000 INJECTION INTRAVENOUS; SUBCUTANEOUS at 05:01

## 2025-02-19 RX ADMIN — LACOSAMIDE 140 MILLIGRAM(S): 150 TABLET, FILM COATED ORAL at 05:00

## 2025-02-19 RX ADMIN — METOPROLOL SUCCINATE 5 MILLIGRAM(S): 50 TABLET, EXTENDED RELEASE ORAL at 11:07

## 2025-02-19 RX ADMIN — LEVETIRACETAM 400 MILLIGRAM(S): 10 INJECTION, SOLUTION INTRAVENOUS at 22:37

## 2025-02-19 RX ADMIN — IPRATROPIUM BROMIDE AND ALBUTEROL SULFATE 3 MILLILITER(S): .5; 2.5 SOLUTION RESPIRATORY (INHALATION) at 18:26

## 2025-02-19 RX ADMIN — ACETYLCYSTEINE 3 MILLILITER(S): 200 INHALANT RESPIRATORY (INHALATION) at 23:45

## 2025-02-19 RX ADMIN — LEVETIRACETAM 400 MILLIGRAM(S): 10 INJECTION, SOLUTION INTRAVENOUS at 13:31

## 2025-02-19 RX ADMIN — ACETYLCYSTEINE 3 MILLILITER(S): 200 INHALANT RESPIRATORY (INHALATION) at 05:00

## 2025-02-19 RX ADMIN — Medication 25 GRAM(S): at 13:31

## 2025-02-19 RX ADMIN — LEVETIRACETAM 400 MILLIGRAM(S): 10 INJECTION, SOLUTION INTRAVENOUS at 05:52

## 2025-02-19 RX ADMIN — METOPROLOL SUCCINATE 5 MILLIGRAM(S): 50 TABLET, EXTENDED RELEASE ORAL at 23:45

## 2025-02-19 RX ADMIN — ACETYLCYSTEINE 3 MILLILITER(S): 200 INHALANT RESPIRATORY (INHALATION) at 18:26

## 2025-02-19 RX ADMIN — IPRATROPIUM BROMIDE AND ALBUTEROL SULFATE 3 MILLILITER(S): .5; 2.5 SOLUTION RESPIRATORY (INHALATION) at 23:45

## 2025-02-19 RX ADMIN — LACOSAMIDE 140 MILLIGRAM(S): 150 TABLET, FILM COATED ORAL at 18:27

## 2025-02-19 RX ADMIN — HEPARIN SODIUM 0 UNIT(S)/HR: 1000 INJECTION INTRAVENOUS; SUBCUTANEOUS at 19:30

## 2025-02-19 RX ADMIN — REMDESIVIR 200 MILLIGRAM(S): 5 INJECTION INTRAVENOUS at 18:27

## 2025-02-19 RX ADMIN — IPRATROPIUM BROMIDE AND ALBUTEROL SULFATE 3 MILLILITER(S): .5; 2.5 SOLUTION RESPIRATORY (INHALATION) at 05:00

## 2025-02-19 RX ADMIN — Medication 400 MILLIGRAM(S): at 05:36

## 2025-02-19 RX ADMIN — DEXAMETHASONE 6 MILLIGRAM(S): 0.5 TABLET ORAL at 05:00

## 2025-02-19 RX ADMIN — Medication 25 GRAM(S): at 06:07

## 2025-02-19 RX ADMIN — METOPROLOL SUCCINATE 5 MILLIGRAM(S): 50 TABLET, EXTENDED RELEASE ORAL at 18:50

## 2025-02-19 RX ADMIN — HEPARIN SODIUM 1700 UNIT(S)/HR: 1000 INJECTION INTRAVENOUS; SUBCUTANEOUS at 11:50

## 2025-02-19 RX ADMIN — ACETYLCYSTEINE 3 MILLILITER(S): 200 INHALANT RESPIRATORY (INHALATION) at 11:08

## 2025-02-19 RX ADMIN — IPRATROPIUM BROMIDE AND ALBUTEROL SULFATE 3 MILLILITER(S): .5; 2.5 SOLUTION RESPIRATORY (INHALATION) at 11:08

## 2025-02-19 RX ADMIN — Medication 1000 MILLIGRAM(S): at 06:00

## 2025-02-19 RX ADMIN — Medication 25 GRAM(S): at 22:37

## 2025-02-19 RX ADMIN — HEPARIN SODIUM 1400 UNIT(S)/HR: 1000 INJECTION INTRAVENOUS; SUBCUTANEOUS at 22:02

## 2025-02-19 NOTE — PROVIDER CONTACT NOTE (OTHER) - ASSESSMENT
pt a/ox4 able to make needs known. notified by tele pt with PAF 4.4 sec SOLO to 150s. pt denies chest pain, palpitations, SOB at this time. spO2 95-98% on HFNC, HR sinus rhythm 80-90s. /68, oral temp 100.7. pt warm to touch , slight diaphoresis.

## 2025-02-19 NOTE — PROVIDER CONTACT NOTE (OTHER) - ACTION/TREATMENT ORDERED:
ACP aware. diet changed to NPO. pt educated on aspiration prec. unable to tolerate PO meds, ACP aware pt not receiving pm meds, all antiepileptic AM meds switched to IV. Speech and swallow pending.

## 2025-02-19 NOTE — CHART NOTE - NSCHARTNOTEFT_GEN_A_CORE
OSCAR MILAN    Notified by RN patient with new onset Afib RvR, , confirmed by ECG. Asymptomatic. VSS. Attending Dr. Leroy made aware.       Interventions taken:  - Lopressor 5 mg IVP q 6 h   - Heparin gtt, no bolus initiated (trend Plt) Risk /benefit discussion with patient/ family.   -Cardiology consult with Dr. Lorrie Santoyo, [facility]   Medicine Department   [facility] #[id number]

## 2025-02-19 NOTE — PROVIDER CONTACT NOTE (OTHER) - ACTION/TREATMENT ORDERED:
Per provider, call respiratory therapist to adjust HFNC to 70% Fio2/60 LPM. No other interventions ordered at this time. Safety measures maintained, will continue to monitor.

## 2025-02-19 NOTE — PROVIDER CONTACT NOTE (CRITICAL VALUE NOTIFICATION) - SITUATION
VBG sodium 115, hemoglobin 6.9, Hct 21, glucose incalculable
aPTT: 139.2
Critical Lab Values:  sodium-117  glucose-818

## 2025-02-19 NOTE — PROGRESS NOTE ADULT - SUBJECTIVE AND OBJECTIVE BOX
[facility]   [phone]  RENAL FOLLOW UP NOTE  --------------------------------------------------------------------------------  Chief Complaint:    24 hour events/subjective:    seen earlier   on high flow O2     PAST HISTORY  --------------------------------------------------------------------------------  No significant changes to PMH, PSH, FHx, SHx, unless otherwise noted    ALLERGIES & MEDICATIONS  --------------------------------------------------------------------------------  Allergies    seasonal allergies (Unknown)  penicillin (Hives)    Intolerances    latex (Rash)    Standing Inpatient Medications  acetylcysteine 20%  Inhalation 3 milliLiter(s) Inhalation every 6 hours  albuterol/ipratropium for Nebulization 3 milliLiter(s) Nebulizer every 6 hours  atorvastatin 20 milliGRAM(s) Oral at bedtime  cholecalciferol 1000 Unit(s) Oral daily  dexAMETHasone  Injectable 6 milliGRAM(s) IV Push daily  escitalopram 15 milliGRAM(s) Oral with breakfast  gabapentin Solution 150 milliGRAM(s) Oral every 12 hours  heparin  Infusion.  Unit(s)/Hr IV Continuous <Continuous>  influenza  Vaccine (HIGH DOSE) 0.5 milliLiter(s) IntraMuscular once  lacosamide IVPB 200 milliGRAM(s) IV Intermittent every 12 hours  levETIRAcetam  IVPB 1000 milliGRAM(s) IV Intermittent <User Schedule>  lurasidone 40 milliGRAM(s) Oral at bedtime  metoprolol tartrate Injectable 5 milliGRAM(s) IV Push every 6 hours  pantoprazole    Tablet 40 milliGRAM(s) Oral before breakfast  piperacillin/tazobactam IVPB.. 3.375 Gram(s) IV Intermittent every 8 hours  polyethylene glycol 3350 17 Gram(s) Oral daily  remdesivir  IVPB   IV Intermittent   remdesivir  IVPB 100 milliGRAM(s) IV Intermittent every 24 hours  senna 2 Tablet(s) Oral at bedtime    PRN Inpatient Medications  acetaminophen     Tablet .. 650 milliGRAM(s) Oral every 6 hours PRN  aluminum hydroxide/magnesium hydroxide/simethicone Suspension 30 milliLiter(s) Oral every 4 hours PRN  heparin   Injectable 7500 Unit(s) IV Push every 6 hours PRN  heparin   Injectable 3500 Unit(s) IV Push every 6 hours PRN  melatonin 3 milliGRAM(s) Oral at bedtime PRN  ondansetron Injectable 4 milliGRAM(s) IV Push every 8 hours PRN      REVIEW OF SYSTEMS  --------------------------------------------------------------------------------    Gen: denies  fevers/chills, or HA or dizziness   CVS: denies chest pain/palpitations  Resp:  +  SOB/Cough  GI: Denies N/V/Abd pain  : Denies dysuria    VITALS/PHYSICAL EXAM  --------------------------------------------------------------------------------  T(C): 36.9 (02-19-25 @ 21:44), Max: 38.2 (02-18-25 @ 22:58)  HR: 86 (02-19-25 @ 21:44) (86 - 132)  BP: 151/93 (02-19-25 @ 21:44) (130/84 - 163/80)  RR: 20 (02-19-25 @ 21:44) (18 - 20)  SpO2: 93% (02-19-25 @ 21:44) (87% - 100%)  Wt(kg): --    Weight (kg): 93.6 (02-19-25 @ 11:00)      02-18-25 @ 07:01  -  02-19-25 @ 07:00  --------------------------------------------------------  IN: 0 mL / OUT: 1200 mL / NET: -1200 mL    02-19-25 @ 07:01  -  02-19-25 @ 22:51  --------------------------------------------------------  IN: 0 mL / OUT: 950 mL / NET: -950 mL      Physical Exam:  	      Gen: ill appearing male, on high flow O2   	Pulm: decrease bs, +coarse   	CV: No JVD. RRR, S1S2; no rub  	Abd: +BS, soft, nontender/nondistended  	: No suprapubic tenderness, external catheter  	UE: Warm, no cyanosis  no clubbing,  no edema;  	LE: Warm, no cyanosis  no clubbing, no edema  	Neuro: alert and oriented.     LABS/STUDIES  --------------------------------------------------------------------------------              8.6    5.83  >-----------<  95       [02-19-25 @ 17:51]              27.2     143  |  107  |  54  ----------------------------<  121      [02-19-25 @ 08:57]  5.0   |  21  |  2.64        Ca     9.3     [02-19-25 @ 08:57]      Mg     2.3     [02-19-25 @ 08:57]      Phos  2.6     [02-19-25 @ 06:27]    TPro  6.3  /  Alb  3.1  /  TBili  0.6  /  DBili  x   /  AST  71  /  ALT  38  /  AlkPhos  134  [02-19-25 @ 08:57]    PT/INR: PT 12.2 , INR 1.07       [02-19-25 @ 06:28]  PTT: 139.2      [02-19-25 @ 17:51]      Creatinine Trend:  SCr 2.64 [02-19 @ 08:57]  SCr 2.09 [02-19 @ 06:28]  SCr 1.95 [02-19 @ 06:27]  SCr 3.11 [02-18 @ 05:02]  SCr 3.27 [02-17 @ 05:06]        Urine Creatinine 159      [02-16-25 @ 20:41]  Urine Urea Nitrogen 655      [02-16-25 @ 20:41]    TSH 0.87      [01-25-25 @ 11:31]

## 2025-02-19 NOTE — PROGRESS NOTE ADULT - ASSESSMENT
Patient is a 69 year old male with PMH of HTN, HLD, VAZQUEZ (on CPAP at bedtime, NC 2 liters during the day), CKD3, epilepsy, schizophrenia, developmental delay, metastatic testicular cancer (s/p orchiectomy, actively on chemotherapy, last dose of etoposide/cisplatin on 6/2024) presenting with worsening shortness of breath. Patient was recently hospitalized at [facility] from January 27th 2025 to February 6th 2025 for seizure and influenza virus infection, which required intubation. He was sent to rehab (originally from home) from hospital and now returns due to sudden onset shortness of breath and worsening oxygenation. Of note, he tested positive for COVID-19 at facility on 2/13/25 and was not put on any COVID-19 treatment at the time, only guaifenesin and scopolamine patch. Patient was placed on non-rebreather and sent to the hospital on 2/16/25.     Acute on chronic hypoxic respiratory failure  COVID-19 pneumonia, possible superimposed bacterial pneumonia   Fever likely due to above   SHAGUFTA on CKD  - noted initial discussion with patient/family and decided to hold off on remdesivir given LFTs and SHAGUFTA  - CXR with likely congestive changes, possible superimposed focal infiltrate in RUL, no effusion  - CT chest with extensive b/l ggo majority new since 1/30/25-c/w COVID pneumonia; tracheomalacia   - PCT likely not helpful given patient with renal dysfunction   - Legionella and Strep pneumoniae urine ags negative   - MRSA PCR screen negative, s/p vancomycin   - Ucx noted with Klebsiella, pt with no gu symptoms   - 2/16 Bcx NGTD x2   - 2/17 Scx with normal resp lucia   - 2/18 had worsening resp status on NC requiring AVAPS, started on remdesivir    -- d/w patients sister Ester over phone last night (2/18) regarding COVID treatment, she agreed to start remdesivir, questions/concerns addressed to the best of my ability   - 2/19 overnight febrile to 100.8F, currently on HFNC, comfortable, WBC remains wnl, UA negative   - exam with no new focal findings, suspect fevers d/t COVID pneumonia     Recommendations:   Follow repeat 2/19 Bcx - in process  Continue remdesivir x5d (end 2/22)  Continue zosyn -adjusted to 3.375g IV Q8h (renally dosed)  Pulmonary following, on dexamethasone   Supplemental O2 as needed, wean as able   Monitor temps/WBC  Supportive care  Aspiration precautions  Continue rest of care per primary team       Greyson Mart M.D.  [facility]  Available on [employer] TEAMS - *PREFERRED*  [phone]  After 5pm on weekdays and all day on weekends - please call [phone]     Thank you for consulting us and involving us in the management of this patients case. In addition to reviewing history, imaging, documents, labs, microbiology, took into account antibiotic stewardship, local antibiogram and infection control strategies and potential transmission issues.

## 2025-02-19 NOTE — PROGRESS NOTE ADULT - ASSESSMENT
69 year old male with a past medical history of hypertension, hyperlipemia VAZQUEZ (on CPAP at bedtime, NC 2 liters during the day), CKD stage 3, epilepsy, schizophrenia, developmental delay, metastatic testicular cancer (s/p orchiectomy, actively on chemotherapy, last dose of etoposide/cisplatin on 6/2024) presenting with worsening shortness of breath. Patient was recently hospitalized at [facility] from January 27th 2025 to February 6th 2025 for seizure and influenza virus infection, which required intubation. He was sent to rehab (originally from home) from hospital. He returns due to sudden onset shortness of breath and worsening oxygenation. Of note, he tested positive for COVID-19       1- SHAGUFTA on CKDIII  2- covid-19  3- CHF  4- anemia      SHAGUFTA suspected in setting of new infection. covid 19   cr is improving   pt is with worsening pulm symptoms due to covid 19   decadron 6 mg iv daily   remdisivir 100 mg iv daily     anemia, trend hgb

## 2025-02-19 NOTE — PROVIDER CONTACT NOTE (CRITICAL VALUE NOTIFICATION) - ACTION/TREATMENT ORDERED:
Provider stated to follow full AC nomogram as ordered, no other interventions at this time. Safety measures maintained, plan of care ongoing, will continue to monitor.
ACP notified, will endorse to day RN
Provider reordered VBG, CBC & CMP- no other interventions ordered at this time. All interventions completed, safety maintained, will c/w monitor.

## 2025-02-19 NOTE — PROGRESS NOTE ADULT - PROBLEM SELECTOR PLAN 1
-Recent admission for acute respiratory failure in the setting of grand mal seizures, influenza, PNA. S/p intubation in MICU, was extubated to AVAPS  -Now COVID +  -CXR with low lung volumes, mild congestion. Possible underlying infiltrate  -CT chest with b/l GGO, new since 1/30/25. Likely COVID PNA +/- superimposed bacterial PNA.   -Continue bronchodilators/mucolytics   -Continue ABX  -Continue steroids  -Increased WOB requiring AVAPS  ePAP 5 RR 14 FIo2 50% Max Pressure 35 Min Pressure 15 qHS and PRN daytime for increased WOB.   -ABG 2/17 acceptable, no co2 retention  -Started on HFNC 2/18. Lowered to 50L/80% this AM, continue to wean as tolerated. Keep sats >90%   -Continue AVAPS qHS and PRN daytime for increased WOB.

## 2025-02-19 NOTE — PROGRESS NOTE ADULT - PROBLEM SELECTOR PLAN 7
Continue with home atorvastatin 20 mg bedtime for HLD, vitamin D supplementation, pantoprazole 40 mg daily, senna 2 tab bedtime, and Miralax 17 gram daily.     General  - DVT prophylaxis: heparin 5000 units q8h   - Diet: regular   - Medication reconciliation: complete   - Code: Full   - Medications: Tylenol 650 mg q6h as needed for pain, Maalox 30 mL q4h as needed for acid reflux, melatonin 3 mg bedtime as needed for insomnia, Zofran 4 mg IV q8h as needed for nausea/vomiting     Disposition  - Needed before discharge: off BIPAP for 24 hours   - Anticipated discharge date: acute   - Discharge to: has apartment in [place] with aide but was recently at SNF for rehab   - Appointments after discharge: PCP    2/16/25 --> plan was discussed with patient's brother Fernando (over the phone, [phone]) in detail. He agrees with plan. All questions answered. He asked that I update Ester (sister, [phone]); she was called and updated as well, also agrees with the plan, also answered all questions.

## 2025-02-19 NOTE — PROGRESS NOTE ADULT - ASSESSMENT
Mr. Gr is a 69 year old male with PMHx of seizure disorder, sleep apnea, HTN, chronic idiopathic constipation, stage III CKD, severe obesity, secondary hyperparathyroidism, anemia, thrombocytopenia, hyperlipidemia, testicular cancer under treatment with Dr. Ovalles who is admitted for acute hypoxic respiratory failure secondary to COVID vs superimposed pneumonia with new onset thrombocytopenia. He was recently discharged 02/06/2025 after a tenous stay including intubation in the ICU for respiratory failure in setting of seizure. He had recovered and was discharged to rehab.      Former smoker, quit 14y prior, denied ETOH, drug use. Lives at home. Does not have children. Denies family history of blood disorders or malignancy.  Denies previous workplace related exposures.  Denies previous history of blood transfusions.  Denied history of thromboses.  Denied history of  requiring anticoagulation.     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  Margins were negative.  pT3Nx. CT C/A/P with few left para-aortic and left iliac chain lymph nodes largest measuring 8.1 cm left para-aortic node, bilateral subcentimeter noncalcified pulmonary nodules measuring up to 7 mm. AFP, LDH, hCG were all elevated. 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 Started on adjuvant therapy with Cisplatin+Etoposide, so far completed 4 cycles of treatment with cisplatin dose reduced 50% and Etoposide 50% due to thrombocytopenia.    #Acute hypoxic respiratory failure  # COVID  - CT noted with bilateral GGO  - ID following  - Pulmonary following  - Antibiotics per primary team and ID     # Thrombocytopenia   - Did occur during most recent admission and improved to normal prior to discharge   - Without signs of bleeding  - Could be multifactorial, possibly due to infection   - Continue to trend  - Transfuse to maintain Plt > 10k unless actively bleeding then maintain > 50k     # Brain lesion  - pt with hx of seizures  - MRI brain now with 5.4 mm enhancing lesion in the LEFT middle cerebellar peduncle with associated edema suspicious for metastases  - MRI Lumbar negative for acute disease  - Small lesion without mass effect or edema, recommended to correlate per neurology if foci and locus are concordant with seizure activity  - Neurology recs noted, new lesion not likely to cause seizure  - It is rare, but nonetheless not impossible, for testicular cancer to be metastatic to the brain  - He will need another PET-CT, can be completed outpatient once stable if concern can proceed with biopsy at that time   - Previous endocrinology eval for thyroid nodule noted  - AFP/[facility] normal,  previously      # 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 Ovalles, agree with current plan  - Follow up as outpatient with Franki Ovalles MD     # Acute kidney injury on CKD Stage IIIA  - Likely multifactorial  - Nephrology consult and recs noted  - Continue to trend     # Normocytic anemia  - Chronic, has hx of PRBC during chemo 07/2024  - Noted Anti E, Anti-K Anti-C antibodies previously  - Monitor for bleeding  - Recommend to transfuse to maintain Hb > 7        Recommendations  - Trend with CBC daily   - Transfuse to maintain Hb > 7   - Transfuse to maintain Plt >10k unless active bleeding then >50k   - Monitor renal function  - Antibiotics per primary team and ID   - Outpatient follow up with Dr. Franki Ovalles         Thank you for allowing me to participate in the care of Mr. Gr please do not hesitate to call or text me if you have further questions or concerns.     Brayan Mart MD  [facility] [place]   Division of Hematology/Oncology  [address] [place] [address], [address]  [place] [address]  P: [phone]  F: [phone]    Attestation:   Total time spent on the encounter: >60 minutes   ----Including face-to-face interaction in addition to chart review, reviewing treatment plan, and managing the patient’s chronic diagnoses as listed in the assessment----     1.	I have reviewed, analyzed and interpreted the following labs: CBC, CMP, imaging:  CT Chest impressions as above.   2.	I have reviewed notes stating pts current admission, consultants and follow ups.   3.	I have reviewed the past medical, family, and surgical history and confirmed with outpatient records   Mr. Gr is a 69 year old male with PMHx of seizure disorder, sleep apnea, HTN, chronic idiopathic constipation, stage III CKD, severe obesity, secondary hyperparathyroidism, anemia, thrombocytopenia, hyperlipidemia, testicular cancer under treatment with Dr. Ovalles who is admitted for acute hypoxic respiratory failure secondary to COVID vs superimposed pneumonia with new onset thrombocytopenia. He was recently discharged 02/06/2025 after a tenous stay including intubation in the ICU for respiratory failure in setting of seizure. He had recovered and was discharged to rehab.      Former smoker, quit 14y prior, denied ETOH, drug use. Lives at home. Does not have children. Denies family history of blood disorders or malignancy.  Denies previous workplace related exposures.  Denies previous history of blood transfusions.  Denied history of thromboses.  Denied history of  requiring anticoagulation.     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  Margins were negative.  pT3Nx. CT C/A/P with few left para-aortic and left iliac chain lymph nodes largest measuring 8.1 cm left para-aortic node, bilateral subcentimeter noncalcified pulmonary nodules measuring up to 7 mm. AFP, LDH, hCG were all elevated. 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 Started on adjuvant therapy with Cisplatin+Etoposide, so far completed 4 cycles of treatment with cisplatin dose reduced 50% and Etoposide 50% due to thrombocytopenia.      02/19/2025: on HFNC, noted tachycardia and fever, Klebsiella in urine as well, thrombocytopenia stable/improving, no signs of active bleeding       #Acute hypoxic respiratory failure  # COVID  - CT noted with bilateral GGO  - ID following  - Pulmonary following  - Antibiotics per primary team and ID     # Thrombocytopenia   - Did occur during most recent admission and improved to normal prior to discharge   - Without signs of bleeding  - Could be multifactorial, possibly due to infection   - Continue to trend  - Transfuse to maintain Plt > 10k unless actively bleeding then maintain > 50k     # Brain lesion  - pt with hx of seizures  - MRI brain now with 5.4 mm enhancing lesion in the LEFT middle cerebellar peduncle with associated edema suspicious for metastases  - MRI Lumbar negative for acute disease  - Small lesion without mass effect or edema, recommended to correlate per neurology if foci and locus are concordant with seizure activity  - Neurology recs noted, new lesion not likely to cause seizure  - It is rare, but nonetheless not impossible, for testicular cancer to be metastatic to the brain  - He will need another PET-CT, can be completed outpatient once stable if concern can proceed with biopsy at that time   - Previous endocrinology eval for thyroid nodule noted  - AFP/[facility] normal,  previously      # 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 Franki Ovalles         Thank you for allowing me to participate in the care of Mr. Gr please do not hesitate to call or text me if you have further questions or concerns.     Brayan Mart MD  [facility] [place]   Division of Hematology/Oncology  [address] [place] [address], [address]  [place] [address]  P: [phone]  F: [phone]    Attestation:    ----Pt evaluated including face-to-face interaction in addition to chart review, reviewing treatment plan, and managing the patient’s chronic diagnoses as listed in the assessment----

## 2025-02-19 NOTE — PROVIDER CONTACT NOTE (OTHER) - ASSESSMENT
notified by tele pt HR to 140s as pt calls RN to bedside. pt stated he could not breathe so he ripped off AVAPs mask. HFNC placed on pt, spO2 96%, HR sustaining 110-120s, oral temp 98.2, /93. pt denies chest pain, discomfort, HA, palpitations. pt does endorse SOB s/p AVAPs removal & coughing episode. notified by tele pt HR to 140s as pt calls RN to bedside. pt stated he could not breathe so he ripped off AVAPs mask. HFNC placed on pt, spO2 96%, HR sustaining 100-120s, oral temp 98.2, /93. pt denies chest pain, discomfort, HA, palpitations. pt does endorse SOB s/p AVAPs removal & coughing episode.

## 2025-02-19 NOTE — PROGRESS NOTE ADULT - PROBLEM SELECTOR PLAN 1
?superimposed PNA on top of COVID-19?  - Zosyn 4.5 g q12h, renally dosed  - Vancomycin discontinued  - Decadron 6mg IVPB daily x 10 days for COVID-19   - Remdesivir started per ID.   - Maintain O2 sats above 92%  - BiPaP daytime prn   - BiPaP at bedtime   - Blood cultures x 2 (-) so far  - MRSA swab negative  - Sputum culture --> few gram variable rods and rare G(+) cocci in pairs  - Legionella Ur Ag (-)  - Streptococcus pneumoniae testing  - Avoiding diuresis at this time due to hypotension at presentation, as well as SHAGUFTA, but will trial if methods above do not work  - Appreciate pulmonology

## 2025-02-19 NOTE — PROGRESS NOTE ADULT - SUBJECTIVE AND OBJECTIVE BOX
SUBJECTIVE/ OVERNIGHT EVENTS:  Labs incorrect due to being drawn near IV site.  repeats ordered  pt awake, comfortable on hi-flow  speech eval pending.  per RN, some difficulty swallowing when giving meds.  denied pain  no cp, no sob.  no n/v/d  no abd pain.  no HA.       --------------------------------------------------------------------------------------------  LABS:                        7.2    5.67  )-----------( 79       ( 19 Feb 2025 06:28 )             23.9     02-19    117[LL]  |  88[L]  |  42[H]  ----------------------------<  818[HH]  3.9   |  15[L]  |  2.09[H]    Ca    7.0[L]      19 Feb 2025 06:28  Phos  2.6     02-19  Mg     1.5     02-19    TPro  4.7[L]  /  Alb  2.4[L]  /  TBili  0.4  /  DBili  x   /  AST  55[H]  /  ALT  31  /  AlkPhos  99  02-19    PT/INR - ( 19 Feb 2025 06:28 )   PT: 12.2 sec;   INR: 1.07 ratio           CAPILLARY BLOOD GLUCOSE            Urinalysis Basic - ( 19 Feb 2025 06:28 )    Color: x / Appearance: x / SG: x / pH: x  Gluc: 818 mg/dL / Ketone: x  / Bili: x / Urobili: x   Blood: x / Protein: x / Nitrite: x   Leuk Esterase: x / RBC: x / WBC x   Sq Epi: x / Non Sq Epi: x / Bacteria: x        RADIOLOGY & ADDITIONAL TESTS:    Imaging Personally Reviewed:  [x] YES  [ ] NO    Consultant(s) Notes Reviewed:  [x] YES  [ ] NO    MEDICATIONS  (STANDING):  acetylcysteine 20%  Inhalation 3 milliLiter(s) Inhalation every 6 hours  albuterol/ipratropium for Nebulization 3 milliLiter(s) Nebulizer every 6 hours  atorvastatin 20 milliGRAM(s) Oral at bedtime  cholecalciferol 1000 Unit(s) Oral daily  dexAMETHasone  Injectable 6 milliGRAM(s) IV Push daily  escitalopram 15 milliGRAM(s) Oral with breakfast  gabapentin Solution 150 milliGRAM(s) Oral every 12 hours  heparin   Injectable 5000 Unit(s) SubCutaneous every 8 hours  influenza  Vaccine (HIGH DOSE) 0.5 milliLiter(s) IntraMuscular once  lacosamide IVPB 200 milliGRAM(s) IV Intermittent every 12 hours  levETIRAcetam  IVPB 1000 milliGRAM(s) IV Intermittent <User Schedule>  lurasidone 40 milliGRAM(s) Oral at bedtime  pantoprazole    Tablet 40 milliGRAM(s) Oral before breakfast  piperacillin/tazobactam IVPB.. 4.5 Gram(s) IV Intermittent every 12 hours  polyethylene glycol 3350 17 Gram(s) Oral daily  remdesivir  IVPB   IV Intermittent   remdesivir  IVPB 100 milliGRAM(s) IV Intermittent every 24 hours  senna 2 Tablet(s) Oral at bedtime    MEDICATIONS  (PRN):  acetaminophen     Tablet .. 650 milliGRAM(s) Oral every 6 hours PRN Temp greater or equal to 38C (100.4F), Mild Pain (1 - 3)  aluminum hydroxide/magnesium hydroxide/simethicone Suspension 30 milliLiter(s) Oral every 4 hours PRN Dyspepsia  melatonin 3 milliGRAM(s) Oral at bedtime PRN Insomnia  ondansetron Injectable 4 milliGRAM(s) IV Push every 8 hours PRN Nausea and/or Vomiting      Care Discussed with Consultants/Other Providers [x] YES  [ ] NO    Vital Signs Last 24 Hrs  T(C): 36.5 (19 Feb 2025 06:45), Max: 38.2 (18 Feb 2025 22:58)  T(F): 97.7 (19 Feb 2025 06:45), Max: 100.8 (19 Feb 2025 05:12)  HR: 97 (19 Feb 2025 06:45) (84 - 122)  BP: 154/93 (19 Feb 2025 04:21) (124/79 - 163/80)  BP(mean): --  RR: 18 (19 Feb 2025 06:45) (18 - 20)  SpO2: 100% (19 Feb 2025 06:45) (87% - 100%)    Parameters below as of 19 Feb 2025 06:45  Patient On (Oxygen Delivery Method): nasal cannula, high flow      I&O's Summary    18 Feb 2025 07:01  -  19 Feb 2025 07:00  --------------------------------------------------------  IN: 0 mL / OUT: 1200 mL / NET: -1200 mL    19 Feb 2025 07:01  -  19 Feb 2025 08:57  --------------------------------------------------------  IN: 0 mL / OUT: 350 mL / NET: -350 mL      PHYSICAL EXAM:   GENERAL: NAD, comfortable, on hi-flow  HEENT: NCAT, EOMI  NECK: supple without JVD  CHEST/LUNG: mild decrease breath sounds bilaterally; No wheeze   CARDIOVASCULAR: regular rate and rhythm, normal S1 and S2, no murmur/rub/gallop  ABDOMEN: positive bowel sounds, non-tender, non-distended, no organomegaly   MUSCULOSKELETAL:  moving all four extremities   EXTREMITIES:  no lower extremity edema  NEUROLOGY: awake, alert, oriented at least to self, knows he is in the hospital.         SUBJECTIVE/ OVERNIGHT EVENTS:  Labs incorrect due to being drawn near IV site.  repeats ordered  Tmax 100.8F  pt awake, comfortable on hi-flow  speech eval pending.  per RN, some difficulty swallowing when giving meds.  denied pain  no cp, no sob.  no n/v/d  no abd pain.  no HA.       --------------------------------------------------------------------------------------------  LABS:                        7.2    5.67  )-----------( 79       ( 19 Feb 2025 06:28 )             23.9     02-19    117[LL]  |  88[L]  |  42[H]  ----------------------------<  818[HH]  3.9   |  15[L]  |  2.09[H]    Ca    7.0[L]      19 Feb 2025 06:28  Phos  2.6     02-19  Mg     1.5     02-19    TPro  4.7[L]  /  Alb  2.4[L]  /  TBili  0.4  /  DBili  x   /  AST  55[H]  /  ALT  31  /  AlkPhos  99  02-19    PT/INR - ( 19 Feb 2025 06:28 )   PT: 12.2 sec;   INR: 1.07 ratio           CAPILLARY BLOOD GLUCOSE            Urinalysis Basic - ( 19 Feb 2025 06:28 )    Color: x / Appearance: x / SG: x / pH: x  Gluc: 818 mg/dL / Ketone: x  / Bili: x / Urobili: x   Blood: x / Protein: x / Nitrite: x   Leuk Esterase: x / RBC: x / WBC x   Sq Epi: x / Non Sq Epi: x / Bacteria: x        RADIOLOGY & ADDITIONAL TESTS:    Imaging Personally Reviewed:  [x] YES  [ ] NO    Consultant(s) Notes Reviewed:  [x] YES  [ ] NO    MEDICATIONS  (STANDING):  acetylcysteine 20%  Inhalation 3 milliLiter(s) Inhalation every 6 hours  albuterol/ipratropium for Nebulization 3 milliLiter(s) Nebulizer every 6 hours  atorvastatin 20 milliGRAM(s) Oral at bedtime  cholecalciferol 1000 Unit(s) Oral daily  dexAMETHasone  Injectable 6 milliGRAM(s) IV Push daily  escitalopram 15 milliGRAM(s) Oral with breakfast  gabapentin Solution 150 milliGRAM(s) Oral every 12 hours  heparin   Injectable 5000 Unit(s) SubCutaneous every 8 hours  influenza  Vaccine (HIGH DOSE) 0.5 milliLiter(s) IntraMuscular once  lacosamide IVPB 200 milliGRAM(s) IV Intermittent every 12 hours  levETIRAcetam  IVPB 1000 milliGRAM(s) IV Intermittent <User Schedule>  lurasidone 40 milliGRAM(s) Oral at bedtime  pantoprazole    Tablet 40 milliGRAM(s) Oral before breakfast  piperacillin/tazobactam IVPB.. 4.5 Gram(s) IV Intermittent every 12 hours  polyethylene glycol 3350 17 Gram(s) Oral daily  remdesivir  IVPB   IV Intermittent   remdesivir  IVPB 100 milliGRAM(s) IV Intermittent every 24 hours  senna 2 Tablet(s) Oral at bedtime    MEDICATIONS  (PRN):  acetaminophen     Tablet .. 650 milliGRAM(s) Oral every 6 hours PRN Temp greater or equal to 38C (100.4F), Mild Pain (1 - 3)  aluminum hydroxide/magnesium hydroxide/simethicone Suspension 30 milliLiter(s) Oral every 4 hours PRN Dyspepsia  melatonin 3 milliGRAM(s) Oral at bedtime PRN Insomnia  ondansetron Injectable 4 milliGRAM(s) IV Push every 8 hours PRN Nausea and/or Vomiting      Care Discussed with Consultants/Other Providers [x] YES  [ ] NO    Vital Signs Last 24 Hrs  T(C): 36.5 (19 Feb 2025 06:45), Max: 38.2 (18 Feb 2025 22:58)  T(F): 97.7 (19 Feb 2025 06:45), Max: 100.8 (19 Feb 2025 05:12)  HR: 97 (19 Feb 2025 06:45) (84 - 122)  BP: 154/93 (19 Feb 2025 04:21) (124/79 - 163/80)  BP(mean): --  RR: 18 (19 Feb 2025 06:45) (18 - 20)  SpO2: 100% (19 Feb 2025 06:45) (87% - 100%)    Parameters below as of 19 Feb 2025 06:45  Patient On (Oxygen Delivery Method): nasal cannula, high flow      I&O's Summary    18 Feb 2025 07:01  -  19 Feb 2025 07:00  --------------------------------------------------------  IN: 0 mL / OUT: 1200 mL / NET: -1200 mL    19 Feb 2025 07:01  -  19 Feb 2025 08:57  --------------------------------------------------------  IN: 0 mL / OUT: 350 mL / NET: -350 mL      PHYSICAL EXAM:   GENERAL: NAD, comfortable, on hi-flow  HEENT: NCAT, EOMI  NECK: supple without JVD  CHEST/LUNG: mild decrease breath sounds bilaterally; No wheeze   CARDIOVASCULAR: regular rate and rhythm, normal S1 and S2, no murmur/rub/gallop  ABDOMEN: positive bowel sounds, non-tender, non-distended, no organomegaly   MUSCULOSKELETAL:  moving all four extremities   EXTREMITIES:  no lower extremity edema  NEUROLOGY: awake, alert, oriented at least to self, knows he is in the hospital.

## 2025-02-19 NOTE — PROGRESS NOTE ADULT - ASSESSMENT
68 y/o M with PMH of HTN, HLD, VAZQUEZ on CPAP and home O2 (2LNC daytime), CKD, epilepsy, schizophrenia developmental delay, metastatic testicular CA. Recent admission at [facility] for acute respiratory failure in the setting of seizures, influenza infection, pulmonary edema, PNA requiring intubation in MICU. Was eventually discharged to rehab. Presents now with SOB, found to be COVID + at facility on 2/13. Febrile on admission to ED to 101.9F. Placed on Bipap for increased WOB. Pulmonary called to consult for acute respiratory failure.

## 2025-02-19 NOTE — PROGRESS NOTE ADULT - SUBJECTIVE AND OBJECTIVE BOX
[facility]  JACINTO Horton Y. Patel, S. Shah, G. Casimir  [phone]  ([phone] - weekdays after 5pm and weekends)    Name: OSCAR MILAN  Age/Gender: 69y Male  MRN: [id number]    Interval History:  Patient seen and examined this morning.   States he feels better this am, currently on HFNC.   Was on AVAPS overnight without issues  Febrile to 100.8F last night, no chills reported.   States cough is same, no n/v/d, or other complaints.   Notes reviewed    Allergies: seasonal allergies (Unknown)  penicillin (Hives)      Objective:  Vitals:   T(F): 97.7 (02-19-25 @ 06:45), Max: 100.8 (02-19-25 @ 05:12)  HR: 97 (02-19-25 @ 06:45) (84 - 122)  BP: 154/93 (02-19-25 @ 04:21) (124/79 - 163/80)  RR: 18 (02-19-25 @ 06:45) (18 - 20)  SpO2: 100% (02-19-25 @ 06:45) (87% - 100%)  Physical Examination:  General: no acute distress, HFNC, obese   HEENT: normocephalic, atraumatic, anicteric  Respiratory: decreased breath sounds b/l   Cardiovascular: S1 and S2 present, normal rate   Gastrointestinal: normal appearing, nondistended  Extremities: no edema, no cyanosis  Skin: no visible rash    Laboratory Studies:  CBC:                       8.7    6.09  )-----------( 84       ( 19 Feb 2025 08:44 )             27.7     WBC Trend:  6.09 02-19-25 @ 08:44  5.67 02-19-25 @ 06:28  6.65 02-18-25 @ 05:02  4.97 02-17-25 @ 05:06  6.83 02-16-25 @ 09:39    CMP: 02-19    143  |  107  |  54[H]  ----------------------------<  121[H]  5.0   |  21[L]  |  2.64[H]    Ca    9.3      19 Feb 2025 08:57  Phos  2.6     02-19  Mg     2.3     02-19    TPro  6.3  /  Alb  3.1[L]  /  TBili  0.6  /  DBili  x   /  AST  71[H]  /  ALT  38  /  AlkPhos  134[H]  02-19    Creatinine: 2.64 mg/dL (02-19-25 @ 08:57)  Creatinine: 2.09 mg/dL (02-19-25 @ 06:28)  Creatinine: 1.95 mg/dL (02-19-25 @ 06:27)  Creatinine: 3.11 mg/dL (02-18-25 @ 05:02)  Creatinine: 3.27 mg/dL (02-17-25 @ 05:06)  Creatinine: 3.27 mg/dL (02-16-25 @ 09:39)    LIVER FUNCTIONS - ( 19 Feb 2025 08:57 )  Alb: 3.1 g/dL / Pro: 6.3 g/dL / ALK PHOS: 134 U/L / ALT: 38 U/L / AST: 71 U/L / GGT: x           Urinalysis (02.19.25 @ 05:11)    Blood, Urine: Small   Glucose Qualitative, Urine: Negative mg/dL   pH Urine: 5.5   Color: Yellow   Urine Appearance: Clear   Bilirubin: Negative   Ketone - Urine: Negative mg/dL   Specific Gravity: 1.021   Protein, Urine: 300 mg/dL   Urobilinogen: 0.2 mg/dL   Nitrite: Negative   Leukocyte Esterase Concentration: Negative  Urine Microscopic-Add On (NC) (02.19.25 @ 05:11)    White Blood Cell - Urine: 1 /HPF   Red Blood Cell - Urine: 1 /HPF   Bacteria: Negative /HPF   Cast: 0 /LPF   Epithelial Cells: 1 /HPF   Amorphous Precipitates: Present   Review: Reviewed    Microbiology: reviewed   Culture - Sputum (collected 02-17-25 @ 22:23)  Source: .Sputum Sputum  Gram Stain (02-18-25 @ 06:21):    Few polymorphonuclear leukocytes per low power field    No Squamous epithelial cells per low power field    Few Gram Variable Rods seen per oil power field    Rare Gram positive cocci in pairs seen per oil power field  Preliminary Report (02-18-25 @ 18:10):    Commensal lucia consistent with body site    Culture - Urine (collected 02-16-25 @ 11:47)  Source: Clean Catch Clean Catch (Midstream)  Final Report (02-18-25 @ 11:44):    10,000 - 49,000 CFU/mL Klebsiella pneumoniae  Organism: Klebsiella pneumoniae (02-18-25 @ 11:44)  Organism: Klebsiella pneumoniae (02-18-25 @ 11:44)      Method Type: MARIELENA      -  Amoxicillin/Clavulanic Acid: S <=8/4      -  Ampicillin: R >16 These ampicillin results predict results for amoxicillin      -  Ampicillin/Sulbactam: S <=4/2      -  Aztreonam: S <=4      -  Cefazolin: S <=2 For uncomplicated UTI with K. pneumoniae, E. coli, or P. mirablis: MARIELENA <=16 is sensitive and MARIELENA >=32 is resistant. This also predicts results for oral agents cefaclor, cefdinir, cefpodoxime, cefprozil, cefuroxime axetil, cephalexin and locarbef for uncomplicated UTI. Note that some isolates may be susceptible to these agents while testing resistant to cefazolin.      -  Cefepime: S <=2      -  Cefoxitin: S <=8      -  Ceftriaxone: S <=1      -  Cefuroxime: S <=4      -  Ciprofloxacin: S <=0.25      -  Ertapenem: S <=0.5      -  Gentamicin: S <=2      -  Imipenem: S <=1      -  Levofloxacin: S <=0.5      -  Meropenem: S <=1      -  Nitrofurantoin: I 64 Should not be used to treat pyelonephritis      -  Piperacillin/Tazobactam: S <=8      -  Tobramycin: S <=2      -  Trimethoprim/Sulfamethoxazole: S <=0.5/9.5    Culture - Blood (collected 02-16-25 @ 09:35)  Source: .Blood BLOOD  Preliminary Report (02-18-25 @ 15:01):    No growth at 48 Hours    Culture - Blood (collected 02-16-25 @ 09:25)  Source: .Blood BLOOD  Preliminary Report (02-18-25 @ 15:01):    No growth at 48 Hours    02-16-25 @ 09:59 SARS-CoV-2 Detected/Influenza A NotDetec/Influenza B NotDetec/RSV NotDetec    Radiology: reviewed   < from: US Kidney and Bladder (02.18.25 @ 13:30) >    IMPRESSION:  *  Limited evaluation due to patient positioning and condition.  *  No hydronephrosis bilaterally.        --- End of Report ---    < end of copied text >    Medications:  acetaminophen     Tablet .. 650 milliGRAM(s) Oral every 6 hours PRN  acetylcysteine 20%  Inhalation 3 milliLiter(s) Inhalation every 6 hours  albuterol/ipratropium for Nebulization 3 milliLiter(s) Nebulizer every 6 hours  aluminum hydroxide/magnesium hydroxide/simethicone Suspension 30 milliLiter(s) Oral every 4 hours PRN  atorvastatin 20 milliGRAM(s) Oral at bedtime  cholecalciferol 1000 Unit(s) Oral daily  dexAMETHasone  Injectable 6 milliGRAM(s) IV Push daily  escitalopram 15 milliGRAM(s) Oral with breakfast  gabapentin Solution 150 milliGRAM(s) Oral every 12 hours  heparin   Injectable 5000 Unit(s) SubCutaneous every 8 hours  influenza  Vaccine (HIGH DOSE) 0.5 milliLiter(s) IntraMuscular once  lacosamide IVPB 200 milliGRAM(s) IV Intermittent every 12 hours  levETIRAcetam  IVPB 1000 milliGRAM(s) IV Intermittent <User Schedule>  lurasidone 40 milliGRAM(s) Oral at bedtime  melatonin 3 milliGRAM(s) Oral at bedtime PRN  ondansetron Injectable 4 milliGRAM(s) IV Push every 8 hours PRN  pantoprazole    Tablet 40 milliGRAM(s) Oral before breakfast  piperacillin/tazobactam IVPB.. 4.5 Gram(s) IV Intermittent every 12 hours  polyethylene glycol 3350 17 Gram(s) Oral daily  remdesivir  IVPB   IV Intermittent   remdesivir  IVPB 100 milliGRAM(s) IV Intermittent every 24 hours  senna 2 Tablet(s) Oral at bedtime    Current Antimicrobials:  piperacillin/tazobactam IVPB.. 4.5 Gram(s) IV Intermittent every 12 hours  remdesivir  IVPB   IV Intermittent   remdesivir  IVPB 100 milliGRAM(s) IV Intermittent every 24 hours    Prior/Completed Antimicrobials:  piperacillin/tazobactam IVPB...  remdesivir  IVPB  vancomycin  IVPB.

## 2025-02-19 NOTE — PROVIDER CONTACT NOTE (CRITICAL VALUE NOTIFICATION) - ASSESSMENT
aPTT: 139.2
Critical Lab Values:  sodium-117  glucose-818
pt a/o3-4 able to make needs known. VSS as seen in flowsheet. no s/s bleeding/hematoma. ecchymosis on heparin SQ. UA sent overnight with blood in urine. urine yellow, no hematuria. pt denies lethargy, pain, SOB at this time

## 2025-02-19 NOTE — PROVIDER CONTACT NOTE (OTHER) - ACTION/TREATMENT ORDERED:
IV tylenol already administered at 22:20 for HA with relief. Cold compresses applied.  EKG ordered for tele event. STAT blood Cx & UA ordered.

## 2025-02-19 NOTE — PROVIDER CONTACT NOTE (CRITICAL VALUE NOTIFICATION) - BACKGROUND
68 y/o male with PMHx HTN, HLD, VAZQUEZ on CPAP, CKD stage 3, epilepsy, schizophrenia, metastatic testicular cancer p/w worsening SOB, found to have covid + PNA.

## 2025-02-19 NOTE — PROGRESS NOTE ADULT - SUBJECTIVE AND OBJECTIVE BOX
DATE OF SERVICE: 02-19-25 @ 15:04    Patient is a 69y old  Male who presents with a chief complaint of Acute on chronic hypoxemic respiratory failure (19 Feb 2025 10:22)      INTERVAL HISTORY: today with new onset afib RVR    REVIEW OF SYSTEMS:  CONSTITUTIONAL: No weakness  EYES/ENT: No visual changes;  No throat pain   NECK: No pain or stiffness  RESPIRATORY: No cough, wheezing; No shortness of breath  CARDIOVASCULAR: No chest pain or palpitations  GASTROINTESTINAL: No abdominal  pain. No nausea, vomiting, or hematemesis  GENITOURINARY: No dysuria, frequency or hematuria  NEUROLOGICAL: No stroke like symptoms  SKIN: No rashes    TELEMETRY Personally reviewed: Afib RVR up to 130s - new onset  	  MEDICATIONS:  metoprolol tartrate Injectable 5 milliGRAM(s) IV Push every 6 hours        PHYSICAL EXAM:  T(C): 36.6 (02-19-25 @ 11:17), Max: 38.2 (02-18-25 @ 22:58)  HR: 115 (02-19-25 @ 11:17) (88 - 132)  BP: 133/97 (02-19-25 @ 11:17) (133/97 - 163/80)  RR: 18 (02-19-25 @ 14:56) (18 - 20)  SpO2: 98% (02-19-25 @ 14:56) (87% - 100%)  Wt(kg): --  I&O's Summary    18 Feb 2025 07:01  -  19 Feb 2025 07:00  --------------------------------------------------------  IN: 0 mL / OUT: 1200 mL / NET: -1200 mL    19 Feb 2025 07:01  -  19 Feb 2025 15:04  --------------------------------------------------------  IN: 0 mL / OUT: 950 mL / NET: -950 mL        Weight (kg): 93.6 (02-19 @ 11:00)    Appearance: In no distress	  HEENT:    PERRL, EOMI	  Cardiovascular:  S1 S2, No JVD  Respiratory: Lungs clear to auscultation, on HFNC  Gastrointestinal:  Soft, Non-tender, + BS	  Vascularature:  No edema of LE  Psychiatric: Appropriate affect   Neuro: no acute focal deficits                               8.7    6.09  )-----------( 84       ( 19 Feb 2025 08:44 )             27.7     02-19    143  |  107  |  54[H]  ----------------------------<  121[H]  5.0   |  21[L]  |  2.64[H]    Ca    9.3      19 Feb 2025 08:57  Phos  2.6     02-19  Mg     2.3     02-19    TPro  6.3  /  Alb  3.1[L]  /  TBili  0.6  /  DBili  x   /  AST  71[H]  /  ALT  38  /  AlkPhos  134[H]  02-19        Labs personally reviewed      ASSESSMENT/PLAN: 	      69 year old male with a past medical history of hypertension, hyperlipemia VAZQUEZ (on CPAP at bedtime, NC 2 liters during the day), CKD stage 3, epilepsy, schizophrenia, developmental delay, metastatic testicular cancer (s/p orchiectomy, actively on chemotherapy, last dose of etoposide/cisplatin on 6/2024), presenting with acute on chronic hypoxemic respiratory failure, secondary to (a) COVID-19 infection, (b) superimposed pneumonia after recent influenza infection, and/or (c) fluid overload.     Problem/Plan - 1:  ·  Problem: Acute on chronic respiratory failure with hypoxia.   ·  Plan: Likely 2/2 PNA, does not appear to be in decompensated HF  - Agree with holding diuresis   - Appreciate pulmonology.    Problem/Plan - 2:  ·  Problem: SHAGUFTA (acute kidney injury).   ·  Plan: Has a history of CKD stage 3, Cr 2.38 at baseline. Presents with Cr 3.27. Likely pre-renal state  - Appreciate nephrology.    Problem/Plan - 3:  ·  Problem: Chronic heart failure with preserved ejection fraction.   ·  Plan: TTE done here 1/17/25 technically difficult, but LV systolic fxn appears nl without any regional wall motion abnormalities  - BNP 5000 <---1100 but appears euvolemic   - Hold off diuretics     Problem/Plan - 4:  ·  Problem: New onset Afib RVR (on tele, confirmed with EKG 2/19)   ·  Plan: Started metoprolol 5mg IVP q6 hours & Hep gtt  - If rate remains uncontrolled, can start Cardizem drip at 5mg/h    Problem/Plan - 5:  ·  Problem: Prophylactic measure.   ·  Plan:  DVT prophylaxis: heparin gtt      TUAN Vanessa, DO [facility]  Cardiovascular Medicine  [address] [place] [address], [address]  Available through call or text on [employer] TE[employer]  Office: [phone]     DATE OF SERVICE: 02-19-25 @ 15:04    Patient is a 69y old  Male who presents with a chief complaint of Acute on chronic hypoxemic respiratory failure (19 Feb 2025 10:22)      INTERVAL HISTORY: today with new onset afib RVR    REVIEW OF SYSTEMS:  CONSTITUTIONAL: No weakness  EYES/ENT: No visual changes;  No throat pain   NECK: No pain or stiffness  RESPIRATORY: No cough, wheezing; No shortness of breath  CARDIOVASCULAR: No chest pain or palpitations  GASTROINTESTINAL: No abdominal  pain. No nausea, vomiting, or hematemesis  GENITOURINARY: No dysuria, frequency or hematuria  NEUROLOGICAL: No stroke like symptoms  SKIN: No rashes    TELEMETRY Personally reviewed: Afib RVR up to 130s - new onset  	  MEDICATIONS:  metoprolol tartrate Injectable 5 milliGRAM(s) IV Push every 6 hours        PHYSICAL EXAM:  T(C): 36.6 (02-19-25 @ 11:17), Max: 38.2 (02-18-25 @ 22:58)  HR: 115 (02-19-25 @ 11:17) (88 - 132)  BP: 133/97 (02-19-25 @ 11:17) (133/97 - 163/80)  RR: 18 (02-19-25 @ 14:56) (18 - 20)  SpO2: 98% (02-19-25 @ 14:56) (87% - 100%)  Wt(kg): --  I&O's Summary    18 Feb 2025 07:01  -  19 Feb 2025 07:00  --------------------------------------------------------  IN: 0 mL / OUT: 1200 mL / NET: -1200 mL    19 Feb 2025 07:01  -  19 Feb 2025 15:04  --------------------------------------------------------  IN: 0 mL / OUT: 950 mL / NET: -950 mL        Weight (kg): 93.6 (02-19 @ 11:00)    Appearance: In no distress	  HEENT:    PERRL, EOMI	  Cardiovascular:  S1 S2, No JVD  Respiratory: Lungs clear to auscultation, on HFNC  Gastrointestinal:  Soft, Non-tender, + BS	  Vascularature:  No edema of LE  Psychiatric: Appropriate affect   Neuro: no acute focal deficits                               8.7    6.09  )-----------( 84       ( 19 Feb 2025 08:44 )             27.7     02-19    143  |  107  |  54[H]  ----------------------------<  121[H]  5.0   |  21[L]  |  2.64[H]    Ca    9.3      19 Feb 2025 08:57  Phos  2.6     02-19  Mg     2.3     02-19    TPro  6.3  /  Alb  3.1[L]  /  TBili  0.6  /  DBili  x   /  AST  71[H]  /  ALT  38  /  AlkPhos  134[H]  02-19        Labs personally reviewed      ASSESSMENT/PLAN: 	      69 year old male with a past medical history of hypertension, hyperlipemia VAZQUEZ (on CPAP at bedtime, NC 2 liters during the day), CKD stage 3, epilepsy, schizophrenia, developmental delay, metastatic testicular cancer (s/p orchiectomy, actively on chemotherapy, last dose of etoposide/cisplatin on 6/2024), presenting with acute on chronic hypoxemic respiratory failure, secondary to (a) COVID-19 infection, (b) superimposed pneumonia after recent influenza infection, and/or (c) fluid overload.     Problem/Plan - 1:  ·  Problem: Acute on chronic respiratory failure with hypoxia.   ·  Plan: Likely 2/2 PNA, does not appear to be in decompensated HF  - Agree with holding diuresis   - Appreciate pulmonology.    Problem/Plan - 2:  ·  Problem: SHAGUFTA (acute kidney injury).   ·  Plan: Has a history of CKD stage 3, Cr 2.38 at baseline. Presents with Cr 3.27. Likely pre-renal state  - Appreciate nephrology.    Problem/Plan - 3:  ·  Problem: Chronic heart failure with preserved ejection fraction.   ·  Plan: TTE done here 1/17/25 technically difficult, but LV systolic fxn appears nl without any regional wall motion abnormalities  - BNP 5000 <---1100 but appears euvolemic   - Hold off diuretics     Problem/Plan - 4:  ·  Problem: New onset Afib RVR (on tele, confirmed with EKG 2/19)   ·  Plan: Started metoprolol 5mg IVP q6 hours & Hep gtt  - If rate remains uncontrolled, can start Cardizem drip at 5mg/h  - Initiated hep gtt  and will transition to Eliquis     Problem/Plan - 5:  ·  Problem: Prophylactic measure.   ·  Plan:  DVT prophylaxis: heparin gtt            TUAN Vanessa DO [facility]  Cardiovascular Medicine  [address] [place] [address], [address]  Available through call or text on [employer] TEAM[employer]  Office: [phone]

## 2025-02-19 NOTE — PROVIDER CONTACT NOTE (OTHER) - ASSESSMENT
pt a/o4 able to make needs known. isolation for COVID. pt with increased coughing episodes with oral intake. dysphagia screening complete, pt failed with 30 cc water, ACP notified. all antiepileptic medications to be switched to iv

## 2025-02-19 NOTE — PROGRESS NOTE ADULT - NSPROGADDITIONALINFOA_GEN_ALL_CORE
acute respiratory failure with hypoxia on hi-flow  remain on hi-flow  speech & swallow eval pending. currently NPO.   may need gentle IVF if LV function stable. clinically dry.   repeat AM labs pending. will follow.  renal f/u.    d/w NP Shantel.     - Dr. SHIRA Leroy ([facility])  - [phone] acute respiratory failure with hypoxia on hi-flow  remain on hi-flow  speech & swallow eval pending. currently NPO.   may need gentle IVF if LV function stable. clinically dry.   repeat AM labs pending. will follow.  renal f/u.  Tmax 100.8F  repeat CXR pending.     d/w DARIAN Funes.     - Dr. SHIRA Leroy ([facility])  - [phone]

## 2025-02-19 NOTE — PROGRESS NOTE ADULT - NS ATTEND AMEND GEN_ALL_CORE FT
Patient care and plan discussed and reviewed with Advanced Care Provider. Plan as outlined above edited by me to reflect our discussion.   In addition, I participated in    - Ordering, reviewing, and interpreting labs, testing, and imaging.  - Reviewing prior hospitalization and where necessary, outpatient records.  - Counselling and educating patient and/or family regarding interpretation of aforementioned items and plan of care.  - Communicating with other health professionals (when not separately reported), and documenting clinical information in the electronic health record.

## 2025-02-19 NOTE — PROVIDER CONTACT NOTE (OTHER) - ASSESSMENT
pt a/ox4 able to make needs known. pt c/o headache rated 8/10, exacerbated by coughing episodes. cough fair, scant productiveness. spO2 98% on HFNC, HR sinus rhythm 80-90s. pt states HA just started and describes it as pressure

## 2025-02-19 NOTE — PROGRESS NOTE ADULT - SUBJECTIVE AND OBJECTIVE BOX
Date of Service: 02-19-25 @ 10:22    Patient is a 69y old  Male who presents with a chief complaint of Acute on chronic hypoxemic respiratory failure (19 Feb 2025 10:05)      Any change in ROS:   Breathing less labored this AM  O2 sats 100% on HFNC 60L/100%  Lowered to 50L/80%  Afib with RVR on tele     MEDICATIONS  (STANDING):  acetylcysteine 20%  Inhalation 3 milliLiter(s) Inhalation every 6 hours  albuterol/ipratropium for Nebulization 3 milliLiter(s) Nebulizer every 6 hours  atorvastatin 20 milliGRAM(s) Oral at bedtime  cholecalciferol 1000 Unit(s) Oral daily  dexAMETHasone  Injectable 6 milliGRAM(s) IV Push daily  escitalopram 15 milliGRAM(s) Oral with breakfast  gabapentin Solution 150 milliGRAM(s) Oral every 12 hours  influenza  Vaccine (HIGH DOSE) 0.5 milliLiter(s) IntraMuscular once  lacosamide IVPB 200 milliGRAM(s) IV Intermittent every 12 hours  levETIRAcetam  IVPB 1000 milliGRAM(s) IV Intermittent <User Schedule>  lurasidone 40 milliGRAM(s) Oral at bedtime  metoprolol tartrate Injectable 5 milliGRAM(s) IV Push every 6 hours  pantoprazole    Tablet 40 milliGRAM(s) Oral before breakfast  piperacillin/tazobactam IVPB.. 3.375 Gram(s) IV Intermittent every 8 hours  polyethylene glycol 3350 17 Gram(s) Oral daily  remdesivir  IVPB   IV Intermittent   remdesivir  IVPB 100 milliGRAM(s) IV Intermittent every 24 hours  senna 2 Tablet(s) Oral at bedtime    MEDICATIONS  (PRN):  acetaminophen     Tablet .. 650 milliGRAM(s) Oral every 6 hours PRN Temp greater or equal to 38C (100.4F), Mild Pain (1 - 3)  aluminum hydroxide/magnesium hydroxide/simethicone Suspension 30 milliLiter(s) Oral every 4 hours PRN Dyspepsia  melatonin 3 milliGRAM(s) Oral at bedtime PRN Insomnia  ondansetron Injectable 4 milliGRAM(s) IV Push every 8 hours PRN Nausea and/or Vomiting    Vital Signs Last 24 Hrs  T(C): 36.5 (19 Feb 2025 06:45), Max: 38.2 (18 Feb 2025 22:58)  T(F): 97.7 (19 Feb 2025 06:45), Max: 100.8 (19 Feb 2025 05:12)  HR: 97 (19 Feb 2025 06:45) (84 - 122)  BP: 154/93 (19 Feb 2025 04:21) (124/79 - 163/80)  BP(mean): --  RR: 18 (19 Feb 2025 06:45) (18 - 20)  SpO2: 100% (19 Feb 2025 06:45) (87% - 100%)    Parameters below as of 19 Feb 2025 06:45  Patient On (Oxygen Delivery Method): nasal cannula, high flow        I&O's Summary    18 Feb 2025 07:01  -  19 Feb 2025 07:00  --------------------------------------------------------  IN: 0 mL / OUT: 1200 mL / NET: -1200 mL    19 Feb 2025 07:01  -  19 Feb 2025 10:22  --------------------------------------------------------  IN: 0 mL / OUT: 350 mL / NET: -350 mL          Physical Exam:   GENERAL: NAD, well-groomed, well-developed  HEENT: BREE/   Atraumatic, Normocephalic  ENMT: No tonsillar erythema, exudates, or enlargement; Moist mucous membranes, Good dentition, No lesions  NECK: Supple, No JVD, Normal thyroid  CHEST/LUNG: b/l rhonchi   CVS: Regular rate and rhythm; No murmurs, rubs, or gallops  GI: : Soft, Nontender, Nondistended; Bowel sounds present  NERVOUS SYSTEM:  Alert & Oriented X3  EXTREMITIES:  2+ Peripheral Pulses, No clubbing, cyanosis, or edema  LYMPH: No lymphadenopathy noted  SKIN: No rashes or lesions  ENDOCRINOLOGY: No Thyromegaly  PSYCH: Appropriate    Labs:  23, 17, 22, 24, 24                            8.7    6.09  )-----------( 84       ( 19 Feb 2025 08:44 )             27.7                         7.2    5.67  )-----------( 79       ( 19 Feb 2025 06:28 )             23.9                         9.3    6.65  )-----------( 94       ( 18 Feb 2025 05:02 )             30.1                         8.3    4.97  )-----------( 76       ( 17 Feb 2025 05:06 )             26.5                         9.1    6.83  )-----------( 87       ( 16 Feb 2025 09:39 )             29.0     02-19    143  |  107  |  54[H]  ----------------------------<  121[H]  5.0   |  21[L]  |  2.64[H]  02-19    117[LL]  |  88[L]  |  42[H]  ----------------------------<  818[HH]  3.9   |  15[L]  |  2.09[H]  02-19    x   |  x   |  x   ----------------------------<  x   x    |  x   |  1.95[H]  02-18    143  |  108  |  63[H]  ----------------------------<  110[H]  4.5   |  21[L]  |  3.11[H]  02-17    140  |  108  |  53[H]  ----------------------------<  168[H]  5.3   |  19[L]  |  3.27[H]  02-16    144  |  108  |  45[H]  ----------------------------<  120[H]  4.9   |  23  |  3.27[H]    Ca    9.3      19 Feb 2025 08:57  Ca    7.0[L]      19 Feb 2025 06:28  Ca    9.2      18 Feb 2025 05:02  Phos  2.6     02-19  Mg     2.3     02-19  Mg     1.5     02-19    TPro  6.3  /  Alb  3.1[L]  /  TBili  0.6  /  DBili  x   /  AST  71[H]  /  ALT  38  /  AlkPhos  134[H]  02-19  TPro  4.7[L]  /  Alb  2.4[L]  /  TBili  0.4  /  DBili  x   /  AST  55[H]  /  ALT  31  /  AlkPhos  99  02-19  TPro  4.3[L]  /  Alb  2.1[L]  /  TBili  0.3  /  DBili  0.2  /  AST  50[H]  /  ALT  27  /  AlkPhos  92  02-19  TPro  6.0  /  Alb  2.9[L]  /  TBili  0.3  /  DBili  x   /  AST  70[H]  /  ALT  46[H]  /  AlkPhos  127[H]  02-18  TPro  6.1  /  Alb  3.0[L]  /  TBili  0.3  /  DBili  x   /  AST  63[H]  /  ALT  43  /  AlkPhos  132[H]  02-17  TPro  6.2  /  Alb  3.2[L]  /  TBili  0.4  /  DBili  x   /  AST  70[H]  /  ALT  48[H]  /  AlkPhos  142[H]  02-16    CAPILLARY BLOOD GLUCOSE          LIVER FUNCTIONS - ( 19 Feb 2025 08:57 )  Alb: 3.1 g/dL / Pro: 6.3 g/dL / ALK PHOS: 134 U/L / ALT: 38 U/L / AST: 71 U/L / GGT: x           PT/INR - ( 19 Feb 2025 06:28 )   PT: 12.2 sec;   INR: 1.07 ratio           Urinalysis Basic - ( 19 Feb 2025 08:57 )    Color: x / Appearance: x / SG: x / pH: x  Gluc: 121 mg/dL / Ketone: x  / Bili: x / Urobili: x   Blood: x / Protein: x / Nitrite: x   Leuk Esterase: x / RBC: x / WBC x   Sq Epi: x / Non Sq Epi: x / Bacteria: x            RECENT CULTURES:  02-17 @ 22:23 .Sputum Sputum       Few polymorphonuclear leukocytes per low power field  No Squamous epithelial cells per low power field  Few Gram Variable Rods seen per oil power field  Rare Gram positive cocci in pairs seen per oil power field           Commensal lucia consistent with body site    02-16 @ 11:47 Clean Catch Clean Catch (Midstream)   MARIELENA      Klebsiella pneumoniae  Klebsiella pneumoniae     10,000 - 49,000 CFU/mL Klebsiella pneumoniae    02-16 @ 09:35 .Blood BLOOD                No growth at 48 Hours    02-16 @ 09:25 .Blood BLOOD                No growth at 48 Hours        Studies      < from: CT Chest No Cont (02.17.25 @ 16:41) >    ACC: [id number] EXAM:  CT CHEST   ORDERED BY: GERSON MORTON     PROCEDURE DATE:  02/17/2025          INTERPRETATION:  INDICATION: Pneumonia, Covid.    Axial CT images of the chest are obtained without intravenous   administration of contrast.    COMPARISON: Chest CT of January 30, 2025.    Right chest wall Mediport as on the prior study.    LYMPH NODES/MEDIASTINUM: No lymphadenopathy.    VASCULATURE/HEART: No pericardial effusion. Vascular calcifications with   involvement of the aorta and the coronary arteries. Heart size appears   enlarged.    UPPER ABDOMEN: Hypodensity within the partially imaged right kidney   suggestive of a cyst.    LUNGS/PLEURA: Limited evaluation due to significant respiratory motion   artifact.    No pleural effusions.    Interval reaeration of the right lower lobe since January 30, 2025 with   residual linear opacities suggestive of subsegmental atelectasis.    Other bilateral mid to upper lung predominant groundglass opacities with   involvement of all 5 lobes majority of which are new since January 30, 2025.    Collapse of the trachea on this expiratory CT representing   tracheomalacia. Secretions within the trachea.    CHEST WALL: Spinal Degenerative Changes. Lower spinal surgery with   partially imagedhardware. Old healed bilateral rib fractures. Old right   clavicular fracture.    IMPRESSION: Limited chest CT due to respiratory motion artifact.    Extensive bilateral groundglass opacities majority of which are new since   January 30, 2025, nonspecific finding may correspond to the given history   of Covid pneumonia.    Interval reaeration of the right lower lobe since January 30, 2025.    Tracheomalacia.    --- End of Report ---    < end of copied text >

## 2025-02-19 NOTE — PROVIDER CONTACT NOTE (OTHER) - ACTION/TREATMENT ORDERED:
Provider ordered STAT EKG to confirm conversion, no other interventions ordered at this time. Safety measures maintained, plan of care ongoing, will continue to monitor.

## 2025-02-19 NOTE — PROGRESS NOTE ADULT - SUBJECTIVE AND OBJECTIVE BOX
[facility] HEMATOLOGY/ONCOLOGY INPATIENT PROGRESS NOTE     Interval Hx:   02-19-25: Mr. Gr was seen at bedside today.    Meds:   MEDICATIONS  (STANDING):  acetylcysteine 20%  Inhalation 3 milliLiter(s) Inhalation every 6 hours  albuterol/ipratropium for Nebulization 3 milliLiter(s) Nebulizer every 6 hours  atorvastatin 20 milliGRAM(s) Oral at bedtime  cholecalciferol 1000 Unit(s) Oral daily  dexAMETHasone  Injectable 6 milliGRAM(s) IV Push daily  escitalopram 15 milliGRAM(s) Oral with breakfast  gabapentin Solution 150 milliGRAM(s) Oral every 12 hours  heparin   Injectable 5000 Unit(s) SubCutaneous every 8 hours  influenza  Vaccine (HIGH DOSE) 0.5 milliLiter(s) IntraMuscular once  lacosamide IVPB 200 milliGRAM(s) IV Intermittent every 12 hours  levETIRAcetam  IVPB 1000 milliGRAM(s) IV Intermittent <User Schedule>  lurasidone 40 milliGRAM(s) Oral at bedtime  pantoprazole    Tablet 40 milliGRAM(s) Oral before breakfast  piperacillin/tazobactam IVPB.. 4.5 Gram(s) IV Intermittent every 12 hours  polyethylene glycol 3350 17 Gram(s) Oral daily  remdesivir  IVPB   IV Intermittent   remdesivir  IVPB 100 milliGRAM(s) IV Intermittent every 24 hours  senna 2 Tablet(s) Oral at bedtime    MEDICATIONS  (PRN):  acetaminophen     Tablet .. 650 milliGRAM(s) Oral every 6 hours PRN Temp greater or equal to 38C (100.4F), Mild Pain (1 - 3)  aluminum hydroxide/magnesium hydroxide/simethicone Suspension 30 milliLiter(s) Oral every 4 hours PRN Dyspepsia  melatonin 3 milliGRAM(s) Oral at bedtime PRN Insomnia  ondansetron Injectable 4 milliGRAM(s) IV Push every 8 hours PRN Nausea and/or Vomiting    Vital Signs Last 24 Hrs  T(C): 36.8 (19 Feb 2025 04:21), Max: 38.2 (18 Feb 2025 22:58)  T(F): 98.2 (19 Feb 2025 04:21), Max: 100.7 (18 Feb 2025 22:58)  HR: 107 (19 Feb 2025 04:26) (84 - 122)  BP: 154/93 (19 Feb 2025 04:21) (124/79 - 163/80)  BP(mean): --  RR: 19 (19 Feb 2025 04:26) (15 - 20)  SpO2: 98% (19 Feb 2025 04:26) (87% - 100%)    Parameters below as of 19 Feb 2025 04:26  Patient On (Oxygen Delivery Method): nasal cannula, high flow    Physical Exam:  Gen: NAD  HEENT: EOMI, MMM  Chest: equal chest rise  Cardiac: regular   Abd: non distended   Neuro: AAOx3     Labs:                        9.3    6.65  )-----------( 94       ( 18 Feb 2025 05:02 )             30.1     CBC Full  -  ( 18 Feb 2025 05:02 )  WBC Count : 6.65 K/uL  RBC Count : 2.91 M/uL  Hemoglobin : 9.3 g/dL  Hematocrit : 30.1 %  Platelet Count - Automated : 94 K/uL  Mean Cell Volume : 103.4 fl  Mean Cell Hemoglobin : 32.0 pg  Mean Cell Hemoglobin Concentration : 30.9 g/dL    02-18    143  |  108  |  63[H]  ----------------------------<  110[H]  4.5   |  21[L]  |  3.11[H]    Ca    9.2      18 Feb 2025 05:02    TPro  6.0  /  Alb  2.9[L]  /  TBili  0.3  /  DBili  x   /  AST  70[H]  /  ALT  46[H]  /  AlkPhos  127[H]  02-18      Bilirubin Total: 0.3 mg/dL (02-18-25 @ 05:02)   [facility] HEMATOLOGY/ONCOLOGY INPATIENT PROGRESS NOTE     Interval Hx:   02-19-25: Mr. Gr was seen at bedside today, awake and alert, on HFNC, noted tachycardia and fever, Klebsiella in urine as well, thrombocytopenia stable/improving, no signs of active bleeding     Meds:   MEDICATIONS  (STANDING):  acetylcysteine 20%  Inhalation 3 milliLiter(s) Inhalation every 6 hours  albuterol/ipratropium for Nebulization 3 milliLiter(s) Nebulizer every 6 hours  atorvastatin 20 milliGRAM(s) Oral at bedtime  cholecalciferol 1000 Unit(s) Oral daily  dexAMETHasone  Injectable 6 milliGRAM(s) IV Push daily  escitalopram 15 milliGRAM(s) Oral with breakfast  gabapentin Solution 150 milliGRAM(s) Oral every 12 hours  heparin   Injectable 5000 Unit(s) SubCutaneous every 8 hours  influenza  Vaccine (HIGH DOSE) 0.5 milliLiter(s) IntraMuscular once  lacosamide IVPB 200 milliGRAM(s) IV Intermittent every 12 hours  levETIRAcetam  IVPB 1000 milliGRAM(s) IV Intermittent <User Schedule>  lurasidone 40 milliGRAM(s) Oral at bedtime  pantoprazole    Tablet 40 milliGRAM(s) Oral before breakfast  piperacillin/tazobactam IVPB.. 4.5 Gram(s) IV Intermittent every 12 hours  polyethylene glycol 3350 17 Gram(s) Oral daily  remdesivir  IVPB   IV Intermittent   remdesivir  IVPB 100 milliGRAM(s) IV Intermittent every 24 hours  senna 2 Tablet(s) Oral at bedtime    MEDICATIONS  (PRN):  acetaminophen     Tablet .. 650 milliGRAM(s) Oral every 6 hours PRN Temp greater or equal to 38C (100.4F), Mild Pain (1 - 3)  aluminum hydroxide/magnesium hydroxide/simethicone Suspension 30 milliLiter(s) Oral every 4 hours PRN Dyspepsia  melatonin 3 milliGRAM(s) Oral at bedtime PRN Insomnia  ondansetron Injectable 4 milliGRAM(s) IV Push every 8 hours PRN Nausea and/or Vomiting    Vital Signs Last 24 Hrs  T(C): 36.8 (19 Feb 2025 04:21), Max: 38.2 (18 Feb 2025 22:58)  T(F): 98.2 (19 Feb 2025 04:21), Max: 100.7 (18 Feb 2025 22:58)  HR: 107 (19 Feb 2025 04:26) (84 - 122)  BP: 154/93 (19 Feb 2025 04:21) (124/79 - 163/80)  BP(mean): --  RR: 19 (19 Feb 2025 04:26) (15 - 20)  SpO2: 98% (19 Feb 2025 04:26) (87% - 100%)    Parameters below as of 19 Feb 2025 04:26  Patient On (Oxygen Delivery Method): nasal cannula, high flow    Physical Exam:  Gen: NAD  HEENT: EOMI, MMM  Chest: equal chest rise  Cardiac: regular   Abd: non distended   Neuro: AAOx3     Labs:                        9.3    6.65  )-----------( 94       ( 18 Feb 2025 05:02 )             30.1     CBC Full  -  ( 18 Feb 2025 05:02 )  WBC Count : 6.65 K/uL  RBC Count : 2.91 M/uL  Hemoglobin : 9.3 g/dL  Hematocrit : 30.1 %  Platelet Count - Automated : 94 K/uL  Mean Cell Volume : 103.4 fl  Mean Cell Hemoglobin : 32.0 pg  Mean Cell Hemoglobin Concentration : 30.9 g/dL    02-18    143  |  108  |  63[H]  ----------------------------<  110[H]  4.5   |  21[L]  |  3.11[H]    Ca    9.2      18 Feb 2025 05:02    TPro  6.0  /  Alb  2.9[L]  /  TBili  0.3  /  DBili  x   /  AST  70[H]  /  ALT  46[H]  /  AlkPhos  127[H]  02-18      Bilirubin Total: 0.3 mg/dL (02-18-25 @ 05:02)

## 2025-02-20 LAB
ALBUMIN SERPL ELPH-MCNC: 2.8 G/DL — LOW (ref 3.3–5)
ALP SERPL-CCNC: 131 U/L — HIGH (ref 40–120)
ALT FLD-CCNC: 33 U/L — SIGNIFICANT CHANGE UP (ref 10–45)
ANION GAP SERPL CALC-SCNC: 17 MMOL/L — SIGNIFICANT CHANGE UP (ref 5–17)
APTT BLD: 108.2 SEC — HIGH (ref 24.5–35.6)
APTT BLD: 74.3 SEC — HIGH (ref 24.5–35.6)
APTT BLD: >200 SEC — CRITICAL HIGH (ref 24.5–35.6)
AST SERPL-CCNC: 61 U/L — HIGH (ref 10–40)
BILIRUB SERPL-MCNC: 0.5 MG/DL — SIGNIFICANT CHANGE UP (ref 0.2–1.2)
BUN SERPL-MCNC: 57 MG/DL — HIGH (ref 7–23)
CALCIUM SERPL-MCNC: 9.2 MG/DL — SIGNIFICANT CHANGE UP (ref 8.4–10.5)
CHLORIDE SERPL-SCNC: 106 MMOL/L — SIGNIFICANT CHANGE UP (ref 96–108)
CO2 SERPL-SCNC: 19 MMOL/L — LOW (ref 22–31)
CREAT SERPL-MCNC: 2.31 MG/DL — HIGH (ref 0.5–1.3)
EGFR: 30 ML/MIN/1.73M2 — LOW
EGFR: 30 ML/MIN/1.73M2 — LOW
GLUCOSE BLDC GLUCOMTR-MCNC: 150 MG/DL — HIGH (ref 70–99)
GLUCOSE SERPL-MCNC: 181 MG/DL — HIGH (ref 70–99)
HCT VFR BLD CALC: 27.6 % — LOW (ref 39–50)
HGB BLD-MCNC: 8.7 G/DL — LOW (ref 13–17)
INR BLD: 1.73 RATIO — HIGH (ref 0.85–1.16)
MAGNESIUM SERPL-MCNC: 2.2 MG/DL — SIGNIFICANT CHANGE UP (ref 1.6–2.6)
MCHC RBC-ENTMCNC: 31.2 PG — SIGNIFICANT CHANGE UP (ref 27–34)
MCHC RBC-ENTMCNC: 31.5 G/DL — LOW (ref 32–36)
MCV RBC AUTO: 98.9 FL — SIGNIFICANT CHANGE UP (ref 80–100)
NRBC BLD AUTO-RTO: 0 /100 WBCS — SIGNIFICANT CHANGE UP (ref 0–0)
PLATELET # BLD AUTO: 93 K/UL — LOW (ref 150–400)
POTASSIUM SERPL-MCNC: 4.3 MMOL/L — SIGNIFICANT CHANGE UP (ref 3.5–5.3)
POTASSIUM SERPL-SCNC: 4.3 MMOL/L — SIGNIFICANT CHANGE UP (ref 3.5–5.3)
PROT SERPL-MCNC: 6.1 G/DL — SIGNIFICANT CHANGE UP (ref 6–8.3)
PROTHROM AB SERPL-ACNC: 19.8 SEC — HIGH (ref 9.9–13.4)
RBC # BLD: 2.79 M/UL — LOW (ref 4.2–5.8)
RBC # FLD: 14.7 % — HIGH (ref 10.3–14.5)
SODIUM SERPL-SCNC: 142 MMOL/L — SIGNIFICANT CHANGE UP (ref 135–145)
WBC # BLD: 5.88 K/UL — SIGNIFICANT CHANGE UP (ref 3.8–10.5)
WBC # FLD AUTO: 5.88 K/UL — SIGNIFICANT CHANGE UP (ref 3.8–10.5)

## 2025-02-20 RX ORDER — METOPROLOL SUCCINATE 50 MG/1
5 TABLET, EXTENDED RELEASE ORAL EVERY 6 HOURS
Refills: 0 | Status: DISCONTINUED | OUTPATIENT
Start: 2025-02-20 | End: 2025-02-20

## 2025-02-20 RX ORDER — METOPROLOL SUCCINATE 50 MG/1
5 TABLET, EXTENDED RELEASE ORAL EVERY 6 HOURS
Refills: 0 | Status: DISCONTINUED | OUTPATIENT
Start: 2025-02-20 | End: 2025-02-24

## 2025-02-20 RX ORDER — AMLODIPINE BESYLATE 10 MG/1
5 TABLET ORAL DAILY
Refills: 0 | Status: DISCONTINUED | OUTPATIENT
Start: 2025-02-20 | End: 2025-02-23

## 2025-02-20 RX ADMIN — IPRATROPIUM BROMIDE AND ALBUTEROL SULFATE 3 MILLILITER(S): .5; 2.5 SOLUTION RESPIRATORY (INHALATION) at 18:17

## 2025-02-20 RX ADMIN — IPRATROPIUM BROMIDE AND ALBUTEROL SULFATE 3 MILLILITER(S): .5; 2.5 SOLUTION RESPIRATORY (INHALATION) at 12:26

## 2025-02-20 RX ADMIN — LEVETIRACETAM 400 MILLIGRAM(S): 10 INJECTION, SOLUTION INTRAVENOUS at 06:55

## 2025-02-20 RX ADMIN — LACOSAMIDE 140 MILLIGRAM(S): 150 TABLET, FILM COATED ORAL at 06:49

## 2025-02-20 RX ADMIN — ACETYLCYSTEINE 3 MILLILITER(S): 200 INHALANT RESPIRATORY (INHALATION) at 06:50

## 2025-02-20 RX ADMIN — ACETYLCYSTEINE 3 MILLILITER(S): 200 INHALANT RESPIRATORY (INHALATION) at 12:27

## 2025-02-20 RX ADMIN — METOPROLOL SUCCINATE 5 MILLIGRAM(S): 50 TABLET, EXTENDED RELEASE ORAL at 12:27

## 2025-02-20 RX ADMIN — METOPROLOL SUCCINATE 5 MILLIGRAM(S): 50 TABLET, EXTENDED RELEASE ORAL at 06:55

## 2025-02-20 RX ADMIN — Medication 25 GRAM(S): at 13:45

## 2025-02-20 RX ADMIN — LEVETIRACETAM 400 MILLIGRAM(S): 10 INJECTION, SOLUTION INTRAVENOUS at 22:08

## 2025-02-20 RX ADMIN — Medication 25 GRAM(S): at 06:48

## 2025-02-20 RX ADMIN — LEVETIRACETAM 400 MILLIGRAM(S): 10 INJECTION, SOLUTION INTRAVENOUS at 13:45

## 2025-02-20 RX ADMIN — DEXAMETHASONE 6 MILLIGRAM(S): 0.5 TABLET ORAL at 06:49

## 2025-02-20 RX ADMIN — HEPARIN SODIUM 1100 UNIT(S)/HR: 1000 INJECTION INTRAVENOUS; SUBCUTANEOUS at 08:43

## 2025-02-20 RX ADMIN — METOPROLOL SUCCINATE 5 MILLIGRAM(S): 50 TABLET, EXTENDED RELEASE ORAL at 18:16

## 2025-02-20 RX ADMIN — LACOSAMIDE 140 MILLIGRAM(S): 150 TABLET, FILM COATED ORAL at 18:17

## 2025-02-20 RX ADMIN — Medication 5 MILLIGRAM(S): at 15:43

## 2025-02-20 RX ADMIN — Medication 25 GRAM(S): at 22:09

## 2025-02-20 RX ADMIN — HEPARIN SODIUM 900 UNIT(S)/HR: 1000 INJECTION INTRAVENOUS; SUBCUTANEOUS at 23:54

## 2025-02-20 RX ADMIN — HEPARIN SODIUM 900 UNIT(S)/HR: 1000 INJECTION INTRAVENOUS; SUBCUTANEOUS at 16:03

## 2025-02-20 RX ADMIN — HEPARIN SODIUM 1400 UNIT(S)/HR: 1000 INJECTION INTRAVENOUS; SUBCUTANEOUS at 06:50

## 2025-02-20 RX ADMIN — IPRATROPIUM BROMIDE AND ALBUTEROL SULFATE 3 MILLILITER(S): .5; 2.5 SOLUTION RESPIRATORY (INHALATION) at 06:55

## 2025-02-20 RX ADMIN — HEPARIN SODIUM 0 UNIT(S)/HR: 1000 INJECTION INTRAVENOUS; SUBCUTANEOUS at 07:32

## 2025-02-20 NOTE — PROGRESS NOTE ADULT - PROBLEM SELECTOR PLAN 6
Unclear cause. Also with anemia. May be related to metastatic cancer that may have gone to bone marrow. Not present in the past vs. viral covid19  - Monitor for improvement daily  - Appreciate heme/onc

## 2025-02-20 NOTE — PROVIDER CONTACT NOTE (OTHER) - ASSESSMENT
Pt A&Ox3, able to make needs known. Pt asymptomatic. VSS. No s/s of bleeding. Pt denies cp, denies SOB, denies pain.

## 2025-02-20 NOTE — PHYSICAL THERAPY INITIAL EVALUATION ADULT - PERTINENT HX OF CURRENT PROBLEM, REHAB EVAL
69 year old male with a past medical history of hypertension, hyperlipemia VAZQUEZ (on CPAP at bedtime, NC 2 liters during the day), CKD stage 3, epilepsy, schizophrenia, developmental delay, metastatic testicular cancer (s/p orchiectomy, actively on chemotherapy, last dose of etoposide/cisplatin on 6/2024) presenting with worsening shortness of breath.   CXR: Bilateral accentuated interstitial markings more prominent than on prior likely congestive. Question superimposed focal infiltrate in the right upper lobe. Correlate clinically for active infection. No pleural effusion or pneumothorax.  CT chest: Extensive bilateral groundglass opacities majority of which are new since   January 30, 2025, nonspecific finding may correspond to the given history   of Covid pneumonia  US kidney/bladder: Limited evaluation due to patient positioning and condition.  *  No hydronephrosis bilaterally.

## 2025-02-20 NOTE — PROGRESS NOTE ADULT - NSPROGADDITIONALINFOA_GEN_ALL_CORE
acute respiratory failure with hypoxia on hi-flow <--> bipap  speech & swallow eval pending. currently NPO.   may need gentle IVF if LV function stable. however, tricky with hypoxia. d/w renal.   speech/swallow eval today.  wean bipap to hi-flow if tolerates.   critically ill.   d/w pulm. d/w renal.   d/w NP Shantel.     - Dr. SHIRA Meltonet ([facility])  - [phone]

## 2025-02-20 NOTE — PROGRESS NOTE ADULT - SUBJECTIVE AND OBJECTIVE BOX
SUBJECTIVE/ OVERNIGHT EVENTS:  Pt seen and examined earlier today.   hypoxic so pt was placed on Bipap.   he is awake, comfortable. denied pain.   no cp, no n/v/d.  no HA, no dizziness. no abdominal pain.     --------------------------------------------------------------------------------------------  LABS:                        8.7    5.88  )-----------( 93       ( 20 Feb 2025 06:17 )             27.6     02-20    142  |  106  |  57[H]  ----------------------------<  181[H]  4.3   |  19[L]  |  2.31[H]    Ca    9.2      20 Feb 2025 06:17  Phos  2.6     02-19  Mg     2.2     02-20    TPro  6.1  /  Alb  2.8[L]  /  TBili  0.5  /  DBili  x   /  AST  61[H]  /  ALT  33  /  AlkPhos  131[H]  02-20    PT/INR - ( 20 Feb 2025 06:18 )   PT: 19.8 sec;   INR: 1.73 ratio         PTT - ( 20 Feb 2025 06:18 )  PTT:>200.0 sec  CAPILLARY BLOOD GLUCOSE            Urinalysis Basic - ( 20 Feb 2025 06:17 )    Color: x / Appearance: x / SG: x / pH: x  Gluc: 181 mg/dL / Ketone: x  / Bili: x / Urobili: x   Blood: x / Protein: x / Nitrite: x   Leuk Esterase: x / RBC: x / WBC x   Sq Epi: x / Non Sq Epi: x / Bacteria: x        RADIOLOGY & ADDITIONAL TESTS:    Imaging Personally Reviewed:  [x] YES  [ ] NO    Consultant(s) Notes Reviewed:  [x] YES  [ ] NO    MEDICATIONS  (STANDING):  albuterol/ipratropium for Nebulization 3 milliLiter(s) Nebulizer every 6 hours  amLODIPine   Tablet 5 milliGRAM(s) Oral daily  atorvastatin 20 milliGRAM(s) Oral at bedtime  cholecalciferol 1000 Unit(s) Oral daily  dexAMETHasone  Injectable 6 milliGRAM(s) IV Push daily  escitalopram 15 milliGRAM(s) Oral with breakfast  gabapentin Solution 150 milliGRAM(s) Oral every 12 hours  heparin  Infusion.  Unit(s)/Hr (17 mL/Hr) IV Continuous <Continuous>  hydrALAZINE 25 milliGRAM(s) Oral three times a day  influenza  Vaccine (HIGH DOSE) 0.5 milliLiter(s) IntraMuscular once  lacosamide IVPB 200 milliGRAM(s) IV Intermittent every 12 hours  levETIRAcetam  IVPB 1000 milliGRAM(s) IV Intermittent <User Schedule>  lurasidone 40 milliGRAM(s) Oral at bedtime  metoprolol tartrate Injectable 5 milliGRAM(s) IV Push every 6 hours  pantoprazole    Tablet 40 milliGRAM(s) Oral before breakfast  piperacillin/tazobactam IVPB.. 3.375 Gram(s) IV Intermittent every 8 hours  polyethylene glycol 3350 17 Gram(s) Oral daily  remdesivir  IVPB   IV Intermittent   remdesivir  IVPB 100 milliGRAM(s) IV Intermittent every 24 hours  senna 2 Tablet(s) Oral at bedtime    MEDICATIONS  (PRN):  acetaminophen     Tablet .. 650 milliGRAM(s) Oral every 6 hours PRN Temp greater or equal to 38C (100.4F), Mild Pain (1 - 3)  aluminum hydroxide/magnesium hydroxide/simethicone Suspension 30 milliLiter(s) Oral every 4 hours PRN Dyspepsia  heparin   Injectable 7500 Unit(s) IV Push every 6 hours PRN For aPTT less than 40  heparin   Injectable 3500 Unit(s) IV Push every 6 hours PRN For aPTT between 40 - 57  melatonin 3 milliGRAM(s) Oral at bedtime PRN Insomnia  ondansetron Injectable 4 milliGRAM(s) IV Push every 8 hours PRN Nausea and/or Vomiting      Care Discussed with Consultants/Other Providers [x] YES  [ ] NO    Vital Signs Last 24 Hrs  T(C): 36.9 (20 Feb 2025 04:37), Max: 37.2 (19 Feb 2025 17:17)  T(F): 98.4 (20 Feb 2025 04:37), Max: 99 (19 Feb 2025 17:17)  HR: 92 (20 Feb 2025 12:23) (77 - 114)  BP: 171/100 (20 Feb 2025 12:23) (130/84 - 185/83)  BP(mean): --  RR: 22 (20 Feb 2025 12:23) (18 - 22)  SpO2: 96% (20 Feb 2025 12:23) (92% - 103%)    Parameters below as of 20 Feb 2025 12:23  Patient On (Oxygen Delivery Method): nasal cannula, high flow      I&O's Summary    19 Feb 2025 07:01  -  20 Feb 2025 07:00  --------------------------------------------------------  IN: 0 mL / OUT: 1325 mL / NET: -1325 mL    20 Feb 2025 07:01  -  20 Feb 2025 15:09  --------------------------------------------------------  IN: 0 mL / OUT: 1100 mL / NET: -1100 mL        PHYSICAL EXAM:   GENERAL: NAD, comfortable, on hi-flow --> bipap  HEENT: NCAT, EOMI  NECK: supple without JVD  CHEST/LUNG: mild decrease breath sounds bilaterally; No wheeze   CARDIOVASCULAR: regular rate and rhythm, normal S1 and S2, no murmur/rub/gallop  ABDOMEN: positive bowel sounds, non-tender, non-distended, no organomegaly   MUSCULOSKELETAL:  moving all four extremities   EXTREMITIES:  no lower extremity edema  NEUROLOGY: awake, alert, oriented at least to self, knows he is in the hospital.

## 2025-02-20 NOTE — SWALLOW BEDSIDE ASSESSMENT ADULT - SWALLOW EVAL: DIAGNOSIS
69 year old male presenting with acute on chronic hypoxemic respiratory failure, secondary to (a) COVID-19 infection, (b) superimposed pneumonia after recent influenza infection, and/or (c) fluid overload. 69Y M presenting w/ acute on chronic hypoxemic respiratory failure, secondary to COVID-19 infection vs. superimposed pneumonia after recent influenza infection. Patient presenting with evidence of an clare-pharyngeal dysphagia superimposed upon by COVID/increased O2 requirements. Swallow profile notable for delayed A-P transport, suspected premature spillage, with delayed initiation of pharyngeal swallow trigger. Pt exhibited wet/gurgly vocal quality s/p conservative PO trials suggesting laryngeal penetration/aspiration. Pt able to obtain baseline vocal quality s/p cued cough and re-swallow. Given hx of dysphagia and modified diets in the past, instrumental evaluation recommended to further assess swallow mechanism and r/o silent aspiration.

## 2025-02-20 NOTE — PROGRESS NOTE ADULT - SUBJECTIVE AND OBJECTIVE BOX
Date of Service: 02-20-25 @ 11:13    Patient is a 69y old  Male who presents with a chief complaint of Acute on chronic hypoxemic respiratory failure (20 Feb 2025 08:37)      Any change in ROS:   O2 sats 97% on HFNC 60L/70%  Lowered to 50L/60%, sats remain mid 90s  Dyspnea improving, denies CP     MEDICATIONS  (STANDING):  acetylcysteine 20%  Inhalation 3 milliLiter(s) Inhalation every 6 hours  albuterol/ipratropium for Nebulization 3 milliLiter(s) Nebulizer every 6 hours  atorvastatin 20 milliGRAM(s) Oral at bedtime  cholecalciferol 1000 Unit(s) Oral daily  dexAMETHasone  Injectable 6 milliGRAM(s) IV Push daily  escitalopram 15 milliGRAM(s) Oral with breakfast  gabapentin Solution 150 milliGRAM(s) Oral every 12 hours  heparin  Infusion.  Unit(s)/Hr (17 mL/Hr) IV Continuous <Continuous>  influenza  Vaccine (HIGH DOSE) 0.5 milliLiter(s) IntraMuscular once  lacosamide IVPB 200 milliGRAM(s) IV Intermittent every 12 hours  levETIRAcetam  IVPB 1000 milliGRAM(s) IV Intermittent <User Schedule>  lurasidone 40 milliGRAM(s) Oral at bedtime  metoprolol tartrate Injectable 5 milliGRAM(s) IV Push every 6 hours  pantoprazole    Tablet 40 milliGRAM(s) Oral before breakfast  piperacillin/tazobactam IVPB.. 3.375 Gram(s) IV Intermittent every 8 hours  polyethylene glycol 3350 17 Gram(s) Oral daily  remdesivir  IVPB   IV Intermittent   remdesivir  IVPB 100 milliGRAM(s) IV Intermittent every 24 hours  senna 2 Tablet(s) Oral at bedtime    MEDICATIONS  (PRN):  acetaminophen     Tablet .. 650 milliGRAM(s) Oral every 6 hours PRN Temp greater or equal to 38C (100.4F), Mild Pain (1 - 3)  aluminum hydroxide/magnesium hydroxide/simethicone Suspension 30 milliLiter(s) Oral every 4 hours PRN Dyspepsia  heparin   Injectable 7500 Unit(s) IV Push every 6 hours PRN For aPTT less than 40  heparin   Injectable 3500 Unit(s) IV Push every 6 hours PRN For aPTT between 40 - 57  melatonin 3 milliGRAM(s) Oral at bedtime PRN Insomnia  ondansetron Injectable 4 milliGRAM(s) IV Push every 8 hours PRN Nausea and/or Vomiting    Vital Signs Last 24 Hrs  T(C): 36.9 (20 Feb 2025 04:37), Max: 37.2 (19 Feb 2025 17:17)  T(F): 98.4 (20 Feb 2025 04:37), Max: 99 (19 Feb 2025 17:17)  HR: 77 (20 Feb 2025 04:37) (77 - 122)  BP: 185/83 (20 Feb 2025 04:37) (130/84 - 185/83)  BP(mean): --  RR: 20 (20 Feb 2025 04:37) (18 - 20)  SpO2: 94% (20 Feb 2025 04:37) (87% - 103%)    Parameters below as of 20 Feb 2025 04:37  Patient On (Oxygen Delivery Method): nasal cannula, high flow  O2 Flow (L/min): 60  O2 Concentration (%): 70    I&O's Summary    19 Feb 2025 07:01  -  20 Feb 2025 07:00  --------------------------------------------------------  IN: 0 mL / OUT: 1325 mL / NET: -1325 mL    20 Feb 2025 07:01  -  20 Feb 2025 11:13  --------------------------------------------------------  IN: 0 mL / OUT: 600 mL / NET: -600 mL          Physical Exam:   GENERAL: NAD, well-groomed, well-developed  HEENT: BREE/   Atraumatic, Normocephalic  ENMT: No tonsillar erythema, exudates, or enlargement; Moist mucous membranes, Good dentition, No lesions  NECK: Supple, No JVD, Normal thyroid  CHEST/LUNG: b/l rhonchi   CVS: Regular rate and rhythm; No murmurs, rubs, or gallops  GI: : Soft, Nontender, Nondistended; Bowel sounds present  NERVOUS SYSTEM:  Alert & Oriented X3  EXTREMITIES:  2+ Peripheral Pulses, No clubbing, cyanosis, or edema  LYMPH: No lymphadenopathy noted  SKIN: No rashes or lesions  ENDOCRINOLOGY: No Thyromegaly  PSYCH: Appropriate    Labs:  23, 17, 22, 24, 24                            8.7    5.88  )-----------( 93       ( 20 Feb 2025 06:17 )             27.6                         8.6    5.83  )-----------( 95       ( 19 Feb 2025 17:51 )             27.2                         8.7    6.09  )-----------( 84       ( 19 Feb 2025 08:44 )             27.7                         7.2    5.67  )-----------( 79       ( 19 Feb 2025 06:28 )             23.9                         9.3    6.65  )-----------( 94       ( 18 Feb 2025 05:02 )             30.1                         8.3    4.97  )-----------( 76       ( 17 Feb 2025 05:06 )             26.5     02-20    142  |  106  |  57[H]  ----------------------------<  181[H]  4.3   |  19[L]  |  2.31[H]  02-19    143  |  107  |  54[H]  ----------------------------<  121[H]  5.0   |  21[L]  |  2.64[H]  02-19    117[LL]  |  88[L]  |  42[H]  ----------------------------<  818[HH]  3.9   |  15[L]  |  2.09[H]  02-19    x   |  x   |  x   ----------------------------<  x   x    |  x   |  1.95[H]  02-18    143  |  108  |  63[H]  ----------------------------<  110[H]  4.5   |  21[L]  |  3.11[H]  02-17    140  |  108  |  53[H]  ----------------------------<  168[H]  5.3   |  19[L]  |  3.27[H]    Ca    9.2      20 Feb 2025 06:17  Ca    9.3      19 Feb 2025 08:57  Ca    7.0[L]      19 Feb 2025 06:28  Phos  2.6     02-19  Mg     2.2     02-20  Mg     2.3     02-19  Mg     1.5     02-19    TPro  6.1  /  Alb  2.8[L]  /  TBili  0.5  /  DBili  x   /  AST  61[H]  /  ALT  33  /  AlkPhos  131[H]  02-20  TPro  6.3  /  Alb  3.1[L]  /  TBili  0.6  /  DBili  x   /  AST  71[H]  /  ALT  38  /  AlkPhos  134[H]  02-19  TPro  4.7[L]  /  Alb  2.4[L]  /  TBili  0.4  /  DBili  x   /  AST  55[H]  /  ALT  31  /  AlkPhos  99  02-19  TPro  4.3[L]  /  Alb  2.1[L]  /  TBili  0.3  /  DBili  0.2  /  AST  50[H]  /  ALT  27  /  AlkPhos  92  02-19  TPro  6.0  /  Alb  2.9[L]  /  TBili  0.3  /  DBili  x   /  AST  70[H]  /  ALT  46[H]  /  AlkPhos  127[H]  02-18  TPro  6.1  /  Alb  3.0[L]  /  TBili  0.3  /  DBili  x   /  AST  63[H]  /  ALT  43  /  AlkPhos  132[H]  02-17    LIVER FUNCTIONS - ( 20 Feb 2025 06:17 )  Alb: 2.8 g/dL / Pro: 6.1 g/dL / ALK PHOS: 131 U/L / ALT: 33 U/L / AST: 61 U/L / GGT: x           PT/INR - ( 20 Feb 2025 06:18 )   PT: 19.8 sec;   INR: 1.73 ratio         PTT - ( 20 Feb 2025 06:18 )  PTT:>200.0 sec  Urinalysis Basic - ( 20 Feb 2025 06:17 )    Color: x / Appearance: x / SG: x / pH: x  Gluc: 181 mg/dL / Ketone: x  / Bili: x / Urobili: x   Blood: x / Protein: x / Nitrite: x   Leuk Esterase: x / RBC: x / WBC x   Sq Epi: x / Non Sq Epi: x / Bacteria: x    RECENT CULTURES:  02-19 @ 00:18 .Blood Blood-Peripheral       No growth at 24 hours    02-17 @ 22:23 .Sputum Sputum     Few polymorphonuclear leukocytes per low power field  No Squamous epithelial cells per low power field  Few Gram Variable Rods seen per oil power field  Rare Gram positive cocci in pairs seen per oil power field       Commensal lucia consistent with body site    02-16 @ 11:47 Clean Catch Clean Catch (Midstream)   MARIELENA      Klebsiella pneumoniae  Klebsiella pneumoniae     10,000 - 49,000 CFU/mL Klebsiella pneumoniae    02-16 @ 09:35 .Blood BLOOD                No growth at 72 Hours    02-16 @ 09:25 .Blood BLOOD                No growth at 72 Hours        Studies  < from: CT Chest No Cont (02.17.25 @ 16:41) >    ACC: [id number] EXAM:  CT CHEST   ORDERED BY: GERSON MORTON     PROCEDURE DATE:  02/17/2025          INTERPRETATION:  INDICATION: Pneumonia, Covid.    Axial CT images of the chest are obtained without intravenous   administration of contrast.    COMPARISON: Chest CT of January 30, 2025.    Right chest wall Mediport as on the prior study.    LYMPH NODES/MEDIASTINUM: No lymphadenopathy.    VASCULATURE/HEART: No pericardial effusion. Vascular calcifications with   involvement of the aorta and the coronary arteries. Heart size appears   enlarged.    UPPER ABDOMEN: Hypodensity within the partially imaged right kidney   suggestive of a cyst.    LUNGS/PLEURA: Limited evaluation due to significant respiratory motion   artifact.    No pleural effusions.    Interval reaeration of the right lower lobe since January 30, 2025 with   residual linear opacities suggestive of subsegmental atelectasis.    Other bilateral mid to upper lung predominant groundglass opacities with   involvement of all 5 lobes majority of which are new since January 30, 2025.    Collapse of the trachea on this expiratory CT representing   tracheomalacia. Secretions within the trachea.    CHEST WALL: Spinal Degenerative Changes. Lower spinal surgery with   partially imagedhardware. Old healed bilateral rib fractures. Old right   clavicular fracture.    IMPRESSION: Limited chest CT due to respiratory motion artifact.    Extensive bilateral groundglass opacities majority of which are new since   January 30, 2025, nonspecific finding may correspond to the given history   of Covid pneumonia.    Interval reaeration of the right lower lobe since January 30, 2025.    Tracheomalacia.    --- End of Report ---    < end of copied text >

## 2025-02-20 NOTE — PROGRESS NOTE ADULT - PROBLEM SELECTOR PLAN 1
-Recent admission for acute respiratory failure in the setting of grand mal seizures, influenza, PNA. S/p intubation in MICU, was extubated to AVAPS  -Now COVID +  -CXR with low lung volumes, mild congestion. Possible underlying infiltrate  -CT chest with b/l GGO, new since 1/30/25. Likely COVID PNA +/- superimposed bacterial PNA.   -Continue bronchodilators/mucolytics   -Continue ABX  -Continue steroids  -Increased WOB requiring AVAPS  ePAP 5 RR 14 FIo2 50% Max Pressure 35 Min Pressure 15 qHS and PRN daytime for increased WOB.   -ABG 2/17 acceptable, no co2 retention  -Started on HFNC 2/18. Lowered to 50L/60% this AM, continue to wean as tolerated. Keep sats >90%   -Continue AVAPS qHS and PRN daytime for increased WOB.

## 2025-02-20 NOTE — SWALLOW BEDSIDE ASSESSMENT ADULT - PHARYNGEAL PHASE
Delayed initiation of pharyngeal swallow trigger. Pt w/ wet/gurgly vocal quality suggesting laryngeal penetration/aspiration. Able to achieve baseline vocal quality s/p strong cough/re-swallow.

## 2025-02-20 NOTE — PROGRESS NOTE ADULT - PROBLEM SELECTOR PLAN 1
?superimposed PNA on top of COVID-19?  - Zosyn 4.5 g q12h, renally dosed  - Vancomycin discontinued  - Decadron 6mg IVPB daily x 10 days for COVID-19   - Remdesivir IV started per ID.   - Maintain O2 sats above 92%  - BiPaP daytime prn   - BiPaP at bedtime   - Blood cultures x 2 (-) so far  - MRSA swab negative  - Sputum culture --> few gram variable rods and rare G(+) cocci in pairs  - Legionella Ur Ag (-)  - Streptococcus pneumoniae testing  - Avoiding diuresis at this time due to hypotension at presentation, as well as SHAGUFTA, but will trial if methods above do not work  - Appreciate pulmonology

## 2025-02-20 NOTE — PROGRESS NOTE ADULT - ASSESSMENT
68 y/o M with PMH of HTN, HLD, VAZQUEZ on CPAP and home O2 (2LNC daytime), CKD, epilepsy, schizophrenia developmental delay, metastatic testicular CA. Recent admission at [facility] for acute respiratory failure in the setting of seizures, influenza infection, pulmonary edema, PNA requiring intubation in MICU. Was eventually discharged to rehab. Presents now with SOB, found to be COVID + at facility on 2/13. Febrile on admission to ED to 101.9F. Placed on Bipap for increased WOB. Pulmonary called to consult for acute respiratory failure.

## 2025-02-20 NOTE — PROGRESS NOTE ADULT - SUBJECTIVE AND OBJECTIVE BOX
[facility]  JACINTO Horton Y. Patel, S. Shah, G. Casimir  [phone]  ([phone] - weekdays after 5pm and weekends)    Name: OSACR MILAN  Age/Gender: 69y Male  MRN: [id number]    Interval History:  Patient seen and examined this morning.   States he feels breathing is ok on HFNC.  Has cough, at times productive, no fever or pain.   Notes reviewed  No concerning overnight events  Afebrile   Allergies: seasonal allergies (Unknown)  penicillin (Hives)      Objective:  Vitals:   T(F): 98.4 (02-20-25 @ 04:37), Max: 99 (02-19-25 @ 17:17)  HR: 77 (02-20-25 @ 04:37) (77 - 132)  BP: 185/83 (02-20-25 @ 04:37) (130/84 - 185/83)  RR: 20 (02-20-25 @ 04:37) (18 - 20)  SpO2: 94% (02-20-25 @ 04:37) (87% - 103%)  Physical Examination:  General: no acute distress, HFNC  HEENT: normocephalic, atraumatic, anicteric  Respiratory: decreased breath sounds b/l   Cardiovascular: S1 and S2 present, normal rate   Gastrointestinal: obese, nontender, nondistended  Extremities: no edema, no cyanosis  Skin: no visible rash    Laboratory Studies:  CBC:                       8.7    5.88  )-----------( 93       ( 20 Feb 2025 06:17 )             27.6     WBC Trend:  5.88 02-20-25 @ 06:17  5.83 02-19-25 @ 17:51  6.09 02-19-25 @ 08:44  5.67 02-19-25 @ 06:28  6.65 02-18-25 @ 05:02  4.97 02-17-25 @ 05:06  6.83 02-16-25 @ 09:39    CMP: 02-20    142  |  106  |  57[H]  ----------------------------<  181[H]  4.3   |  19[L]  |  2.31[H]    Ca    9.2      20 Feb 2025 06:17  Phos  2.6     02-19  Mg     2.2     02-20    TPro  6.1  /  Alb  2.8[L]  /  TBili  0.5  /  DBili  x   /  AST  61[H]  /  ALT  33  /  AlkPhos  131[H]  02-20    Creatinine: 2.31 mg/dL (02-20-25 @ 06:17)  Creatinine: 2.64 mg/dL (02-19-25 @ 08:57)  Creatinine: 2.09 mg/dL (02-19-25 @ 06:28)  Creatinine: 1.95 mg/dL (02-19-25 @ 06:27)  Creatinine: 3.11 mg/dL (02-18-25 @ 05:02)  Creatinine: 3.27 mg/dL (02-17-25 @ 05:06)  Creatinine: 3.27 mg/dL (02-16-25 @ 09:39)      LIVER FUNCTIONS - ( 20 Feb 2025 06:17 )  Alb: 2.8 g/dL / Pro: 6.1 g/dL / ALK PHOS: 131 U/L / ALT: 33 U/L / AST: 61 U/L / GGT: x           Microbiology: reviewed   Culture - Blood (collected 02-19-25 @ 00:18)  Source: .Blood Blood-Peripheral  Preliminary Report (02-20-25 @ 03:02):    No growth at 24 hours    Culture - Blood (collected 02-19-25 @ 00:18)  Source: .Blood Blood-Peripheral  Preliminary Report (02-20-25 @ 03:02):    No growth at 24 hours    Culture - Sputum (collected 02-17-25 @ 22:23)  Source: .Sputum Sputum  Gram Stain (02-18-25 @ 06:21):    Few polymorphonuclear leukocytes per low power field    No Squamous epithelial cells per low power field    Few Gram Variable Rods seen per oil power field    Rare Gram positive cocci in pairs seen per oil power field  Final Report (02-19-25 @ 16:20):    Commensal lucia consistent with body site    Culture - Urine (collected 02-16-25 @ 11:47)  Source: Clean Catch Clean Catch (Midstream)  Final Report (02-18-25 @ 11:44):    10,000 - 49,000 CFU/mL Klebsiella pneumoniae  Organism: Klebsiella pneumoniae (02-18-25 @ 11:44)  Organism: Klebsiella pneumoniae (02-18-25 @ 11:44)      Method Type: MARIELENA      -  Amoxicillin/Clavulanic Acid: S <=8/4      -  Ampicillin: R >16 These ampicillin results predict results for amoxicillin      -  Ampicillin/Sulbactam: S <=4/2      -  Aztreonam: S <=4      -  Cefazolin: S <=2 For uncomplicated UTI with K. pneumoniae, E. coli, or P. mirablis: MARIELENA <=16 is sensitive and MARIELENA >=32 is resistant. This also predicts results for oral agents cefaclor, cefdinir, cefpodoxime, cefprozil, cefuroxime axetil, cephalexin and locarbef for uncomplicated UTI. Note that some isolates may be susceptible to these agents while testing resistant to cefazolin.      -  Cefepime: S <=2      -  Cefoxitin: S <=8      -  Ceftriaxone: S <=1      -  Cefuroxime: S <=4      -  Ciprofloxacin: S <=0.25      -  Ertapenem: S <=0.5      -  Gentamicin: S <=2      -  Imipenem: S <=1      -  Levofloxacin: S <=0.5      -  Meropenem: S <=1      -  Nitrofurantoin: I 64 Should not be used to treat pyelonephritis      -  Piperacillin/Tazobactam: S <=8      -  Tobramycin: S <=2      -  Trimethoprim/Sulfamethoxazole: S <=0.5/9.5    Culture - Blood (collected 02-16-25 @ 09:35)  Source: .Blood BLOOD  Preliminary Report (02-19-25 @ 15:01):    No growth at 72 Hours    Culture - Blood (collected 02-16-25 @ 09:25)  Source: .Blood BLOOD  Preliminary Report (02-19-25 @ 15:01):    No growth at 72 Hours        02-16-25 @ 09:59 SARS-CoV-2 Detected/Influenza A NotDetec/Influenza B NotDetec/RSV NotDetec    Radiology: reviewed     Medications:  acetaminophen     Tablet .. 650 milliGRAM(s) Oral every 6 hours PRN  acetylcysteine 20%  Inhalation 3 milliLiter(s) Inhalation every 6 hours  albuterol/ipratropium for Nebulization 3 milliLiter(s) Nebulizer every 6 hours  aluminum hydroxide/magnesium hydroxide/simethicone Suspension 30 milliLiter(s) Oral every 4 hours PRN  atorvastatin 20 milliGRAM(s) Oral at bedtime  cholecalciferol 1000 Unit(s) Oral daily  dexAMETHasone  Injectable 6 milliGRAM(s) IV Push daily  escitalopram 15 milliGRAM(s) Oral with breakfast  gabapentin Solution 150 milliGRAM(s) Oral every 12 hours  heparin   Injectable 7500 Unit(s) IV Push every 6 hours PRN  heparin   Injectable 3500 Unit(s) IV Push every 6 hours PRN  heparin  Infusion.  Unit(s)/Hr IV Continuous <Continuous>  influenza  Vaccine (HIGH DOSE) 0.5 milliLiter(s) IntraMuscular once  lacosamide IVPB 200 milliGRAM(s) IV Intermittent every 12 hours  levETIRAcetam  IVPB 1000 milliGRAM(s) IV Intermittent <User Schedule>  lurasidone 40 milliGRAM(s) Oral at bedtime  melatonin 3 milliGRAM(s) Oral at bedtime PRN  metoprolol tartrate Injectable 5 milliGRAM(s) IV Push every 6 hours  ondansetron Injectable 4 milliGRAM(s) IV Push every 8 hours PRN  pantoprazole    Tablet 40 milliGRAM(s) Oral before breakfast  piperacillin/tazobactam IVPB.. 3.375 Gram(s) IV Intermittent every 8 hours  polyethylene glycol 3350 17 Gram(s) Oral daily  remdesivir  IVPB   IV Intermittent   remdesivir  IVPB 100 milliGRAM(s) IV Intermittent every 24 hours  senna 2 Tablet(s) Oral at bedtime    Current Antimicrobials:  piperacillin/tazobactam IVPB.. 3.375 Gram(s) IV Intermittent every 8 hours  remdesivir  IVPB   IV Intermittent   remdesivir  IVPB 100 milliGRAM(s) IV Intermittent every 24 hours    Prior/Completed Antimicrobials:  piperacillin/tazobactam IVPB...  remdesivir  IVPB  vancomycin  IVPB.

## 2025-02-20 NOTE — PROGRESS NOTE ADULT - SUBJECTIVE AND OBJECTIVE BOX
"Chief Complaint  Establish Care (syncope, covid )    Subjective    History of Present Illness {CC  Problem List  Visit  Diagnosis   Encounters  Notes  Medications  Labs  Result Review Imaging  Media :23}     You have chosen to receive care through the use of telemedicine. Telemedicine enables health care providers at different locations to provide safe, effective, and convenient care through the use of technology. As with any health care service, there are risks associated with the use of telemedicine, including equipment failure, poor connections, and  issues.    • Do you understand the risks and benefits of telemedicine as I have explained them to you? Yes  • Have your questions regarding telemedicine been answered? Yes  • Do you consent to the use of telemedicine in your medical care today? Yes    History of Present Illness   39 year old female with telehealth visit today for ongoing evaluation of her syncope, htn and covid. Patient was recently hospitalized at [facility] with covid. Patient did experience two syncopal spells while actively vomiting but notes no further syncopal spells. She notes that her heart rate elevates with [id number]. Notes that bp is 120s systolic. Notes significant fatigue.   Objective     Vital Signs:   Vitals:    12/09/21 0910   BP: 120/80   BP Location: Left arm   Patient Position: Sitting   Pulse: 110   SpO2: 98%   Weight: 89.2 kg (196 lb 9.6 oz)   Height: 154.9 cm (61\")     Body mass index is 37.15 kg/m².  Physical Exam  Vitals and nursing note reviewed.   Constitutional:       Appearance: Normal appearance.   HENT:      Head: Normocephalic.   Pulmonary:      Effort: Pulmonary effort is normal.   Neurological:      Mental Status: She is alert and oriented to person, place, and time.   Psychiatric:         Mood and Affect: Mood normal.         Behavior: Behavior normal.         Thought Content: Thought content normal.              Result Review  Data " [facility]   [phone]  RENAL FOLLOW UP NOTE  --------------------------------------------------------------------------------  Chief Complaint:    24 hour events/subjective:    seen earlier   states still sob   pt sister at bedside    PAST HISTORY  --------------------------------------------------------------------------------  No significant changes to PMH, PSH, FHx, SHx, unless otherwise noted    ALLERGIES & MEDICATIONS  --------------------------------------------------------------------------------  Allergies    seasonal allergies (Unknown)  penicillin (Hives)    Intolerances    latex (Rash)    Standing Inpatient Medications  albuterol/ipratropium for Nebulization 3 milliLiter(s) Nebulizer every 6 hours  amLODIPine   Tablet 5 milliGRAM(s) Oral daily  atorvastatin 20 milliGRAM(s) Oral at bedtime  cholecalciferol 1000 Unit(s) Oral daily  dexAMETHasone  Injectable 6 milliGRAM(s) IV Push daily  escitalopram 15 milliGRAM(s) Oral with breakfast  gabapentin Solution 150 milliGRAM(s) Oral every 12 hours  heparin  Infusion.  Unit(s)/Hr IV Continuous <Continuous>  hydrALAZINE 25 milliGRAM(s) Oral three times a day  influenza  Vaccine (HIGH DOSE) 0.5 milliLiter(s) IntraMuscular once  lacosamide IVPB 200 milliGRAM(s) IV Intermittent every 12 hours  levETIRAcetam  IVPB 1000 milliGRAM(s) IV Intermittent <User Schedule>  lurasidone 40 milliGRAM(s) Oral at bedtime  metoprolol tartrate Injectable 5 milliGRAM(s) IV Push every 6 hours  pantoprazole    Tablet 40 milliGRAM(s) Oral before breakfast  piperacillin/tazobactam IVPB.. 3.375 Gram(s) IV Intermittent every 8 hours  polyethylene glycol 3350 17 Gram(s) Oral daily  remdesivir  IVPB   IV Intermittent   remdesivir  IVPB 100 milliGRAM(s) IV Intermittent every 24 hours  senna 2 Tablet(s) Oral at bedtime    PRN Inpatient Medications  acetaminophen     Tablet .. 650 milliGRAM(s) Oral every 6 hours PRN  aluminum hydroxide/magnesium hydroxide/simethicone Suspension 30 milliLiter(s) Oral every 4 hours PRN  heparin   Injectable 7500 Unit(s) IV Push every 6 hours PRN  heparin   Injectable 3500 Unit(s) IV Push every 6 hours PRN  melatonin 3 milliGRAM(s) Oral at bedtime PRN  ondansetron Injectable 4 milliGRAM(s) IV Push every 8 hours PRN      REVIEW OF SYSTEMS  --------------------------------------------------------------------------------    Gen: denies  fevers/chills, or HA or dizziness  CVS: denies chest pain/palpitations  Resp: +  SOB/Cough  GI: Denies N/V/Abd pain + decrease po intake   : Denies dysuria    VITALS/PHYSICAL EXAM  --------------------------------------------------------------------------------  T(C): 36.7 (02-20-25 @ 21:36), Max: 36.9 (02-20-25 @ 04:37)  HR: 62 (02-20-25 @ 21:36) (54 - 114)  BP: 155/88 (02-20-25 @ 21:36) (147/82 - 185/83)  RR: 20 (02-20-25 @ 21:36) (20 - 22)  SpO2: 92% (02-20-25 @ 21:36) (91% - 103%)  Wt(kg): --    Weight (kg): 93.6 (02-19-25 @ 11:00)      02-19-25 @ 07:01  -  02-20-25 @ 07:00  --------------------------------------------------------  IN: 0 mL / OUT: 1325 mL / NET: -1325 mL    02-20-25 @ 07:01  -  02-20-25 @ 22:13  --------------------------------------------------------  IN: 0 mL / OUT: 1100 mL / NET: -1100 mL      Physical Exam:  	      Gen: ill appearing male, on high flow O2   	Pulm: decrease bs, +coarse   	CV: No JVD. RRR, S1S2; no rub  	Abd: +BS, soft, nontender/nondistended  	: No suprapubic tenderness, external catheter  	UE: Warm, no cyanosis  no clubbing,  no edema;  	LE: Warm, no cyanosis  no clubbing, no edema  	Neuro: alert and oriented.       LABS/STUDIES  --------------------------------------------------------------------------------              8.7    5.88  >-----------<  93       [02-20-25 @ 06:17]              27.6     142  |  106  |  57  ----------------------------<  181      [02-20-25 @ 06:17]  4.3   |  19  |  2.31        Ca     9.2     [02-20-25 @ 06:17]      Mg     2.2     [02-20-25 @ 06:17]      Phos  2.6     [02-19-25 @ 06:27]    TPro  6.1  /  Alb  2.8  /  TBili  0.5  /  DBili  x   /  AST  61  /  ALT  33  /  AlkPhos  131  [02-20-25 @ 06:17]    PT/INR: PT 19.8 , INR 1.73       [02-20-25 @ 06:18]  PTT: 108.2      [02-20-25 @ 15:46]      Creatinine Trend:  SCr 2.31 [02-20 @ 06:17]  SCr 2.64 [02-19 @ 08:57]  SCr 2.09 [02-19 @ 06:28]  SCr 1.95 [02-19 @ 06:27]  SCr 3.11 [02-18 @ 05:02]        Urine Creatinine 159      [02-16-25 @ 13:44]  Urine Urea Nitrogen 655      [02-16-25 @ 13:44]    TSH 0.87      [01-25-25 @ 11:31]       Reviewed:{ Labs  Result Review  Imaging  Med Tab  Media :23}   Lab Results   Component Value Date    GLUCOSE 117 (H) 12/05/2021    CALCIUM 8.3 (L) 12/05/2021     12/05/2021    K 4.3 12/05/2021    CO2 22.0 12/05/2021     12/05/2021    BUN 13 12/05/2021    CREATININE 0.52 (L) 12/05/2021    EGFRIFNONA 133 12/05/2021    BCR 25.0 12/05/2021    ANIONGAP 11.0 12/05/2021     Lab Results   Component Value Date    WBC 5.84 12/05/2021    HGB 13.6 12/05/2021    HCT 40.4 12/05/2021    MCV 89.2 12/05/2021     12/05/2021                  Assessment and Plan {CC Problem List  Visit Diagnosis  ROS  Review (Popup)  Health Maintenance  Quality  BestPractice  Medications  SmartSets  SnapShot Encounters  Media :23}   1. Inappropriate sinus tachycardia  Continue bisoprolol 5 mg daily  Monitor bp closely     2. Primary hypertension  Monitor bp closely  Continue bisoprolol  3. Vasovagal syncope  Continue good hydration  Vasovagal syncope in setting of vomiting          Follow Up {Instructions Charge Capture  Follow-up Communications :23}   Return if symptoms worsen or fail to improve.    Patient was given instructions and counseling regarding her condition or for health maintenance advice. Please see specific information pulled into the AVS if appropriate.  Patient was instructed to call the [facility] with any questions, concerns, or worsening symptoms.

## 2025-02-20 NOTE — SWALLOW BEDSIDE ASSESSMENT ADULT - COMMENTS
In the ER, vital signs significant for T-max 101.9F, HR 84-90. WBC 6.8. Hemoglobin 9.1. Platelet 87. AST 70. ALT 48. Cr 3.27. BUN 45. pH 7.30, pCO2 48. UA negative for infection. Patient given vancomycin, Zosyn, albuterol, and  cc bolus. Patient admitted to the general medicine service for further care. Pulmonology consulted for acute respiratory failure. In the ER, vital signs significant for T-max 101.9F, HR 84-90. WBC 6.8. Hemoglobin 9.1. Platelet 87. AST 70. ALT 48. Cr 3.27. BUN 45. pH 7.30, pCO2 48. UA negative for infection. Patient given vancomycin, Zosyn, albuterol, and  cc bolus. Patient admitted to the general medicine service for further care. Pulmonology consulted for acute respiratory failure. Nephrology following for worsening creatinine. 2/18 pt w/ increased respiratory requirements xfer from 6L NC to AVAPS. Started on remdesivir. 2/19 pt failed RN administered Dysphagia screen; Klebsiella in urine; transition to HFNC; RN reporting pt w/ difficulty swallowing medication and febrile overnight. Cardiology consulted for new onset Afib RvR. 2/20 HFNC lowered to 50L/60% w/ sats in mid 90s.     Imaging:   CT chest with b/l GGO, new since 1/30/25. Likely COVID PNA +/- superimposed bacterial PNA; tracheomalacia.   MRI brain now with 5.4 mm enhancing lesion in the LEFT middle cerebellar peduncle with associated edema suspicious for metastases.    Swallow hx: Pt known to this service w/ initial bedside swallow evaluation completed January 2023. Most recent instrumental evaluation was a FEES completed 1/30/25 revealing an clare-pharyngeal dysphagia. Pt w/ laryngeal penetration before the swallow for mildly thick liquids via straw, laryngeal penetration during the swallow for moderately thick liquids via cup and consecutive straw sips. Residue remains which is cleared with cued cough/re-swallow. Airway protection improved with small, single sips via straw. Pt not a candidate for strategies 2/2 impulsivity and limited carryover. Conservative diet of puree and moderately thick liquids recommended given multiple intubation and c/f aspiration PNA.

## 2025-02-20 NOTE — SWALLOW BEDSIDE ASSESSMENT ADULT - SLP GENERAL OBSERVATIONS
Patient encountered seated upright in bed, on HFNC 70%/60L, A&Ox3, +hypophonic/hoarse vocal quality, +blood tinged secretions upon coughing, +contact/airborne precautions.

## 2025-02-20 NOTE — PROGRESS NOTE ADULT - ASSESSMENT
Patient is a 69 year old male with PMH of HTN, HLD, VAZQUEZ (on CPAP at bedtime, NC 2 liters during the day), CKD3, epilepsy, schizophrenia, developmental delay, metastatic testicular cancer (s/p orchiectomy, actively on chemotherapy, last dose of etoposide/cisplatin on 6/2024) presenting with worsening shortness of breath. Patient was recently hospitalized at [facility] from January 27th 2025 to February 6th 2025 for seizure and influenza virus infection, which required intubation. He was sent to rehab (originally from home) from hospital and now returns due to sudden onset shortness of breath and worsening oxygenation. Of note, he tested positive for COVID-19 at facility on 2/13/25 and was not put on any COVID-19 treatment at the time, only guaifenesin and scopolamine patch. Patient was placed on non-rebreather and sent to the hospital on 2/16/25.     Acute on chronic hypoxic respiratory failure  COVID-19 pneumonia, possible superimposed bacterial pneumonia   Fever likely due to above   SHAGUFTA on CKD  - noted initial discussion with patient/family and decided to hold off on remdesivir given LFTs and SHAGUFTA  - CXR with likely congestive changes, possible superimposed focal infiltrate in RUL, no effusion  - CT chest with extensive b/l ggo majority new since 1/30/25-c/w COVID pneumonia; tracheomalacia   - PCT likely not helpful given patient with renal dysfunction   - Legionella and Strep pneumoniae urine ags negative   - MRSA PCR screen negative, s/p vancomycin   - Ucx noted with Klebsiella, pt with no gu symptoms   - 2/16 Bcx NGTD x2   - 2/17 Scx with normal resp lucia   - 2/18 had worsening resp status on NC requiring AVAPS, started on remdesivir    -- d/w patients sister Ester over phone last night (2/18) regarding COVID treatment, she agreed to start remdesivir, questions/concerns addressed to the best of my ability   - 2/19 overnight febrile to 100.8F, currently on HFNC, comfortable, WBC remains wnl, UA negative   - exam with no new focal findings, suspect fevers d/t COVID pneumonia   - 2/20 now afebrile over past 24h, remains on HFNC, WBC wnl    Recommendations:   Follow repeat 2/19 Bcx - NGTD x2   Continue remdesivir x5d (end 2/22)  Continue zosyn 3.375g IV Q8h (renally dosed)  Pulmonary following, on dexamethasone   Supplemental O2 as needed, wean as able   Monitor temps/WBC, Cr   Supportive care  Aspiration precautions  Continue rest of care per primary team       Greyson Mart M.D.  [facility]  Available on [employer] TEAMS - *PREFERRED*  [phone]  After 5pm on weekdays and all day on weekends - please call [phone]     Thank you for consulting us and involving us in the management of this patients case. In addition to reviewing history, imaging, documents, labs, microbiology, took into account antibiotic stewardship, local antibiogram and infection control strategies and potential transmission issues.

## 2025-02-20 NOTE — PROGRESS NOTE ADULT - SUBJECTIVE AND OBJECTIVE BOX
DATE OF SERVICE: 02-20-25 @ 13:25    Patient is a 69y old  Male who presents with a chief complaint of Acute on chronic hypoxemic respiratory failure (20 Feb 2025 11:13)      INTERVAL HISTORY: in no acute distress    REVIEW OF SYSTEMS:  CONSTITUTIONAL: No weakness  EYES/ENT: No visual changes;  No throat pain   NECK: No pain or stiffness  RESPIRATORY: No cough, wheezing; No shortness of breath  CARDIOVASCULAR: No chest pain or palpitations  GASTROINTESTINAL: No abdominal  pain. No nausea, vomiting, or hematemesis  GENITOURINARY: No dysuria, frequency or hematuria  NEUROLOGICAL: No stroke like symptoms  SKIN: No rashes    TELEMETRY Personally reviewed: SR 70s-100, ST up to 120 overnight; self converted from Afib around 5pm last night  	  MEDICATIONS:  metoprolol tartrate Injectable 5 milliGRAM(s) IV Push every 6 hours        PHYSICAL EXAM:  T(C): 36.9 (02-20-25 @ 04:37), Max: 37.2 (02-19-25 @ 17:17)  HR: 92 (02-20-25 @ 12:23) (77 - 122)  BP: 171/100 (02-20-25 @ 12:23) (130/84 - 185/83)  RR: 22 (02-20-25 @ 12:23) (18 - 22)  SpO2: 96% (02-20-25 @ 12:23) (87% - 103%)  Wt(kg): --  I&O's Summary    19 Feb 2025 07:01  -  20 Feb 2025 07:00  --------------------------------------------------------  IN: 0 mL / OUT: 1325 mL / NET: -1325 mL    20 Feb 2025 07:01  -  20 Feb 2025 13:26  --------------------------------------------------------  IN: 0 mL / OUT: 600 mL / NET: -600 mL          Appearance: In no distress	  HEENT:    PERRL, EOMI	  Cardiovascular:  S1 S2, No JVD  Respiratory: Lungs clear to auscultation	  Gastrointestinal:  Soft, Non-tender, + BS	  Vascularature:  No edema of LE  Psychiatric: Appropriate affect   Neuro: no acute focal deficits                               8.7    5.88  )-----------( 93       ( 20 Feb 2025 06:17 )             27.6     02-20    142  |  106  |  57[H]  ----------------------------<  181[H]  4.3   |  19[L]  |  2.31[H]    Ca    9.2      20 Feb 2025 06:17  Phos  2.6     02-19  Mg     2.2     02-20    TPro  6.1  /  Alb  2.8[L]  /  TBili  0.5  /  DBili  x   /  AST  61[H]  /  ALT  33  /  AlkPhos  131[H]  02-20        Labs personally reviewed      ASSESSMENT/PLAN: 	      69 year old male with a past medical history of hypertension, hyperlipemia VAZQUEZ (on CPAP at bedtime, NC 2 liters during the day), CKD stage 3, epilepsy, schizophrenia, developmental delay, metastatic testicular cancer (s/p orchiectomy, actively on chemotherapy, last dose of etoposide/cisplatin on 6/2024), presenting with acute on chronic hypoxemic respiratory failure, secondary to (a) COVID-19 infection, (b) superimposed pneumonia after recent influenza infection, and/or (c) fluid overload.     Problem/Plan - 1:  ·  Problem: Acute on chronic respiratory failure with hypoxia.   ·  Plan: Likely 2/2 PNA, does not appear to be in decompensated HF  - Agree with holding diuresis   - Appreciate pulmonology.    Problem/Plan - 2:  ·  Problem: SHAGUFTA (acute kidney injury).   ·  Plan: Has a history of CKD stage 3, Cr 2.38 at baseline. Presents with Cr 3.27. Likely pre-renal state  - Appreciate nephrology.    Problem/Plan - 3:  ·  Problem: Chronic heart failure with preserved ejection fraction.   ·  Plan: TTE done here 1/17/25 technically difficult, but LV systolic fxn appears nl without any regional wall motion abnormalities  - Repeat TTE pending  - BNP 5000 <---1100 but appears euvolemic   - Hold off diuretics     Problem/Plan - 4:  ·  Problem: New onset Afib RVR (on tele, confirmed with EKG 2/19)   ·  Plan: Started metoprolol 5mg IVP q6 hours & Hep gtt  - If rate remains uncontrolled, can start Cardizem drip at 5mg/h  - Initiated hep gtt and will transition to Eliquis     Problem/Plan - 5:  ·  Problem: Prophylactic measure.   ·  Plan:  DVT prophylaxis: heparin gtt      TUAN Vanessa DO [facility]  Cardiovascular Medicine  [address] [place] [address], [address]  Available through call or text on [employer] TEAMs  Office: [phone]     DATE OF SERVICE: 02-20-25 @ 13:25    Patient is a 69y old  Male who presents with a chief complaint of Acute on chronic hypoxemic respiratory failure (20 Feb 2025 11:13)      INTERVAL HISTORY: in no acute distress    REVIEW OF SYSTEMS:  CONSTITUTIONAL: No weakness  EYES/ENT: No visual changes;  No throat pain   NECK: No pain or stiffness  RESPIRATORY: No cough, wheezing; No shortness of breath  CARDIOVASCULAR: No chest pain or palpitations  GASTROINTESTINAL: No abdominal  pain. No nausea, vomiting, or hematemesis  GENITOURINARY: No dysuria, frequency or hematuria  NEUROLOGICAL: No stroke like symptoms  SKIN: No rashes    TELEMETRY Personally reviewed: SR 70s-100, ST up to 120 overnight; self converted from Afib around 5pm last night  	  MEDICATIONS:  metoprolol tartrate Injectable 5 milliGRAM(s) IV Push every 6 hours        PHYSICAL EXAM:  T(C): 36.9 (02-20-25 @ 04:37), Max: 37.2 (02-19-25 @ 17:17)  HR: 92 (02-20-25 @ 12:23) (77 - 122)  BP: 171/100 (02-20-25 @ 12:23) (130/84 - 185/83)  RR: 22 (02-20-25 @ 12:23) (18 - 22)  SpO2: 96% (02-20-25 @ 12:23) (87% - 103%)  Wt(kg): --  I&O's Summary    19 Feb 2025 07:01  -  20 Feb 2025 07:00  --------------------------------------------------------  IN: 0 mL / OUT: 1325 mL / NET: -1325 mL    20 Feb 2025 07:01  -  20 Feb 2025 13:26  --------------------------------------------------------  IN: 0 mL / OUT: 600 mL / NET: -600 mL          Appearance: In no distress	  HEENT:    PERRL, EOMI	  Cardiovascular:  S1 S2, No JVD  Respiratory: Lungs clear to auscultation	  Gastrointestinal:  Soft, Non-tender, + BS	  Vascularature:  No edema of LE  Psychiatric: Appropriate affect   Neuro: no acute focal deficits                               8.7    5.88  )-----------( 93       ( 20 Feb 2025 06:17 )             27.6     02-20    142  |  106  |  57[H]  ----------------------------<  181[H]  4.3   |  19[L]  |  2.31[H]    Ca    9.2      20 Feb 2025 06:17  Phos  2.6     02-19  Mg     2.2     02-20    TPro  6.1  /  Alb  2.8[L]  /  TBili  0.5  /  DBili  x   /  AST  61[H]  /  ALT  33  /  AlkPhos  131[H]  02-20        Labs personally reviewed      ASSESSMENT/PLAN: 	      69 year old male with a past medical history of hypertension, hyperlipemia VAZQUEZ (on CPAP at bedtime, NC 2 liters during the day), CKD stage 3, epilepsy, schizophrenia, developmental delay, metastatic testicular cancer (s/p orchiectomy, actively on chemotherapy, last dose of etoposide/cisplatin on 6/2024), presenting with acute on chronic hypoxemic respiratory failure, secondary to (a) COVID-19 infection, (b) superimposed pneumonia after recent influenza infection, and/or (c) fluid overload.     Problem/Plan - 1:  ·  Problem: Acute on chronic respiratory failure with hypoxia.   ·  Plan: Likely 2/2 PNA, does not appear to be in decompensated HF  - Agree with holding diuresis   - Appreciate pulmonology.    Problem/Plan - 2:  ·  Problem: SHAGUFTA (acute kidney injury).   ·  Plan: Has a history of CKD stage 3, Cr 2.38 at baseline. Presents with Cr 3.27. Likely pre-renal state  - Appreciate nephrology.    Problem/Plan - 3:  ·  Problem: Chronic heart failure with preserved ejection fraction.   ·  Plan: TTE done here 1/17/25 technically difficult, but LV systolic fxn appears nl without any regional wall motion abnormalities  - Repeat TTE pending  - BNP 5000 <---1100 but appears euvolemic   - Hold off diuretics     Problem/Plan - 4:  ·  Problem: New onset Afib RVR (on tele, confirmed with EKG 2/19)   ·  Plan: Started metoprolol 5mg IVP q6 hours & Hep gtt  - If rate remains uncontrolled, can start Cardizem drip at 5mg/h  - Initiated hep gtt and will transition to Eliquis     Problem/Plan - 5:  ·  Problem: HTN    ·  Plan: Start Hydral 25mg TID & amlodipine 5mg for elevated BP readings (2/19)    Problem/Plan - 6:  ·  Problem: Prophylactic measure.   ·  Plan:  DVT prophylaxis: heparin gtt      TUAN Vanessa, DO [facility]  Cardiovascular Medicine  [address] [place] [address], [address]  Available through call or text on [employer] TEAMs  Office: [phone]     DATE OF SERVICE: 02-20-25 @ 13:25    Patient is a 69y old  Male who presents with a chief complaint of Acute on chronic hypoxemic respiratory failure (20 Feb 2025 11:13)      INTERVAL HISTORY: in no acute distress    REVIEW OF SYSTEMS:  CONSTITUTIONAL: No weakness  EYES/ENT: No visual changes;  No throat pain   NECK: No pain or stiffness  RESPIRATORY: No cough, wheezing; No shortness of breath  CARDIOVASCULAR: No chest pain or palpitations  GASTROINTESTINAL: No abdominal  pain. No nausea, vomiting, or hematemesis  GENITOURINARY: No dysuria, frequency or hematuria  NEUROLOGICAL: No stroke like symptoms  SKIN: No rashes    TELEMETRY Personally reviewed: SR 70s-100, ST up to 120 overnight; self converted from Afib around 5pm last night  	  MEDICATIONS:  metoprolol tartrate Injectable 5 milliGRAM(s) IV Push every 6 hours        PHYSICAL EXAM:  T(C): 36.9 (02-20-25 @ 04:37), Max: 37.2 (02-19-25 @ 17:17)  HR: 92 (02-20-25 @ 12:23) (77 - 122)  BP: 171/100 (02-20-25 @ 12:23) (130/84 - 185/83)  RR: 22 (02-20-25 @ 12:23) (18 - 22)  SpO2: 96% (02-20-25 @ 12:23) (87% - 103%)  Wt(kg): --  I&O's Summary    19 Feb 2025 07:01  -  20 Feb 2025 07:00  --------------------------------------------------------  IN: 0 mL / OUT: 1325 mL / NET: -1325 mL    20 Feb 2025 07:01  -  20 Feb 2025 13:26  --------------------------------------------------------  IN: 0 mL / OUT: 600 mL / NET: -600 mL          Appearance: In no distress	  HEENT:    PERRL, EOMI	  Cardiovascular:  S1 S2, No JVD  Respiratory: Lungs clear to auscultation	  Gastrointestinal:  Soft, Non-tender, + BS	  Vascularature:  No edema of LE  Psychiatric: Appropriate affect   Neuro: no acute focal deficits                               8.7    5.88  )-----------( 93       ( 20 Feb 2025 06:17 )             27.6     02-20    142  |  106  |  57[H]  ----------------------------<  181[H]  4.3   |  19[L]  |  2.31[H]    Ca    9.2      20 Feb 2025 06:17  Phos  2.6     02-19  Mg     2.2     02-20    TPro  6.1  /  Alb  2.8[L]  /  TBili  0.5  /  DBili  x   /  AST  61[H]  /  ALT  33  /  AlkPhos  131[H]  02-20        Labs personally reviewed      ASSESSMENT/PLAN: 	    69 year old male with a past medical history of hypertension, hyperlipemia VAZQUEZ (on CPAP at bedtime, NC 2 liters during the day), CKD stage 3, epilepsy, schizophrenia, developmental delay, metastatic testicular cancer (s/p orchiectomy, actively on chemotherapy, last dose of etoposide/cisplatin on 6/2024), presenting with acute on chronic hypoxemic respiratory failure, secondary to (a) COVID-19 infection, (b) superimposed pneumonia after recent influenza infection, and/or (c) fluid overload.     Problem/Plan - 1:  ·  Problem: Acute on chronic respiratory failure with hypoxia.   ·  Plan: Likely 2/2 PNA, does not appear to be in decompensated HF  - Agree with holding diuresis   - Appreciate pulmonology.    Problem/Plan - 2:  ·  Problem: SHAGUFTA (acute kidney injury).   ·  Plan: Has a history of CKD stage 3, Cr 2.38 at baseline. Presents with Cr 3.27. Likely pre-renal state  - Appreciate nephrology.    Problem/Plan - 3:  ·  Problem: Chronic heart failure with preserved ejection fraction.   ·  Plan: TTE done here 1/17/25 technically difficult, but LV systolic fxn appears nl without any regional wall motion abnormalities  - Repeat TTE pending  - BNP 5000 <---1100 but appears euvolemic   - Hold off diuretics     Problem/Plan - 4:  ·  Problem: New onset Afib RVR (on tele, confirmed with EKG 2/19)   ·  Plan: Started metoprolol 5mg IVP q6 hours & Hep gtt  - If rate remains uncontrolled, can start Cardizem drip at 5mg/h  - Initiated hep gtt and will transition to Eliquis     Problem/Plan - 5:  ·  Problem: HTN    ·  Plan: Start Hydral 25mg TID & amlodipine 5mg for uncontrolled BP (2/19)    Problem/Plan - 6:  ·  Problem: Prophylactic measure.   ·  Plan:  DVT prophylaxis: heparin gtt      TUAN Vanessa, DO [facility]  Cardiovascular Medicine  [address] [place] [address], [address]  Available through call or text on [employer] TEAM[employer]  Office: [phone]

## 2025-02-20 NOTE — PROGRESS NOTE ADULT - ASSESSMENT
Mr. Gr is a 69 year old male with PMHx of seizure disorder, sleep apnea, HTN, chronic idiopathic constipation, stage III CKD, severe obesity, secondary hyperparathyroidism, anemia, thrombocytopenia, hyperlipidemia, testicular cancer under treatment with Dr. Ovalles who is admitted for acute hypoxic respiratory failure secondary to COVID vs superimposed pneumonia with new onset thrombocytopenia. He was recently discharged 02/06/2025 after a tenous stay including intubation in the ICU for respiratory failure in setting of seizure. He had recovered and was discharged to rehab.      Former smoker, quit 14y prior, denied ETOH, drug use. Lives at home. Does not have children. Denies family history of blood disorders or malignancy.  Denies previous workplace related exposures.  Denies previous history of blood transfusions.  Denied history of thromboses.  Denied history of  requiring anticoagulation.     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  Margins were negative.  pT3Nx. CT C/A/P with few left para-aortic and left iliac chain lymph nodes largest measuring 8.1 cm left para-aortic node, bilateral subcentimeter noncalcified pulmonary nodules measuring up to 7 mm. AFP, LDH, hCG were all elevated. 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 Started on adjuvant therapy with Cisplatin+Etoposide, so far completed 4 cycles of treatment with cisplatin dose reduced 50% and Etoposide 50% due to thrombocytopenia.      02/19/2025: on HFNC, noted tachycardia and fever, Klebsiella in urine as well, thrombocytopenia stable/improving, no signs of active bleeding       #Acute hypoxic respiratory failure  # COVID  - CT noted with bilateral GGO  - ID following  - Pulmonary following  - Antibiotics per primary team and ID     # Thrombocytopenia   - Did occur during most recent admission and improved to normal prior to discharge   - Without signs of bleeding  - Could be multifactorial, possibly due to infection   - Continue to trend  - Transfuse to maintain Plt > 10k unless actively bleeding then maintain > 50k     # Brain lesion  - pt with hx of seizures  - MRI brain now with 5.4 mm enhancing lesion in the LEFT middle cerebellar peduncle with associated edema suspicious for metastases  - MRI Lumbar negative for acute disease  - Small lesion without mass effect or edema, recommended to correlate per neurology if foci and locus are concordant with seizure activity  - Neurology recs noted, new lesion not likely to cause seizure  - It is rare, but nonetheless not impossible, for testicular cancer to be metastatic to the brain  - He will need another PET-CT, can be completed outpatient once stable if concern can proceed with biopsy at that time   - Previous endocrinology eval for thyroid nodule noted  - AFP/[facility] normal,  previously      # 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 Franki Ovalles         Thank you for allowing me to participate in the care of Mr. Gr please do not hesitate to call or text me if you have further questions or concerns.     Brayan Mart MD  [facility] [place]   Division of Hematology/Oncology  [address] [place] [address], [address]  [place] [address]  P: [phone]  F: [phone]    Attestation:    ----Pt evaluated including face-to-face interaction in addition to chart review, reviewing treatment plan, and managing the patient’s chronic diagnoses as listed in the assessment----        Mr. Gr is a 69 year old male with PMHx of seizure disorder, sleep apnea, HTN, chronic idiopathic constipation, stage III CKD, severe obesity, secondary hyperparathyroidism, anemia, thrombocytopenia, hyperlipidemia, testicular cancer under treatment with Dr. Ovalles who is admitted for acute hypoxic respiratory failure secondary to COVID vs superimposed pneumonia with new onset thrombocytopenia. He was recently discharged 02/06/2025 after a tenous stay including intubation in the ICU for respiratory failure in setting of seizure. He had recovered and was discharged to rehab.      Former smoker, quit 14y prior, denied ETOH, drug use. Lives at home. Does not have children. Denies family history of blood disorders or malignancy.  Denies previous workplace related exposures.  Denies previous history of blood transfusions.  Denied history of thromboses.  Denied history of  requiring anticoagulation.     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  Margins were negative.  pT3Nx. CT C/A/P with few left para-aortic and left iliac chain lymph nodes largest measuring 8.1 cm left para-aortic node, bilateral subcentimeter noncalcified pulmonary nodules measuring up to 7 mm. AFP, LDH, hCG were all elevated. 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 Started on adjuvant therapy with Cisplatin+Etoposide, so far completed 4 cycles of treatment with cisplatin dose reduced 50% and Etoposide 50% due to thrombocytopenia.      02/19/2025: on HFNC, noted tachycardia and fever, Klebsiella in urine as well, thrombocytopenia stable/improving, no signs of active bleeding     02/20/2025: developed A.fib with RVR and was placed on heparin drip and pending cardiology eval      #Acute hypoxic respiratory failure  # COVID  - CT noted with bilateral GGO  - ID following  - Pulmonary following  - Antibiotics per primary team and ID     # Thrombocytopenia   - Did occur during most recent admission and improved to normal prior to discharge   - Without signs of bleeding  - Could be multifactorial, possibly due to infection   - Continue to trend  - Transfuse to maintain Plt > 10k unless actively bleeding then maintain > 50k     # Brain lesion  - pt with hx of seizures  - MRI brain now with 5.4 mm enhancing lesion in the LEFT middle cerebellar peduncle with associated edema suspicious for metastases  - MRI Lumbar negative for acute disease  - Small lesion without mass effect or edema, recommended to correlate per neurology if foci and locus are concordant with seizure activity  - Neurology recs noted, new lesion not likely to cause seizure  - It is rare, but nonetheless not impossible, for testicular cancer to be metastatic to the brain  - He will need another PET-CT, can be completed outpatient once stable if concern can proceed with biopsy at that time   - Previous endocrinology eval for thyroid nodule noted  - AFP/[facility] normal,  previously      # 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 Franki Ovalles         Thank you for allowing me to participate in the care of Mr. Gr please do not hesitate to call or text me if you have further questions or concerns.     Brayan Mart MD  [facility] [place]   Division of Hematology/Oncology  [address] [facility], [address]  [place] [address]  P: [phone]  F: [phone]    Attestation:    ----Pt evaluated including face-to-face interaction in addition to chart review, reviewing treatment plan, and managing the patient’s chronic diagnoses as listed in the assessment----

## 2025-02-20 NOTE — PROGRESS NOTE ADULT - PROBLEM SELECTOR PLAN 7
Continue with home atorvastatin 20 mg bedtime for HLD, vitamin D supplementation, pantoprazole 40 mg daily, senna 2 tab bedtime, and Miralax 17 gram daily.     General  - DVT prophylaxis: heparin 5000 units q8h   - Diet: regular   - Medication reconciliation: complete   - Code: Full   - Medications: Tylenol 650 mg q6h as needed for pain, Maalox 30 mL q4h as needed for acid reflux, melatonin 3 mg bedtime as needed for insomnia, Zofran 4 mg IV q8h as needed for nausea/vomiting     Disposition  - Needed before discharge: off BIPAP for 24 hours   - Anticipated discharge date: acute   - Discharge to: has apartment in [place] with aide but was recently at SNF for rehab   - Appointments after discharge: PCP    2/16/25 --> plan was discussed with patient's brother Fernando (over the phone, [phone]) in detail. He agrees with plan. All questions answered. He asked that I update Ester (sister, [phone]); she was called and updated as well, also agrees with the plan, also answered all questions.

## 2025-02-20 NOTE — PROGRESS NOTE ADULT - ASSESSMENT
69 year old male with a past medical history of hypertension, hyperlipemia VAZQUEZ (on CPAP at bedtime, NC 2 liters during the day), CKD stage 3, epilepsy, schizophrenia, developmental delay, metastatic testicular cancer (s/p orchiectomy, actively on chemotherapy, last dose of etoposide/cisplatin on 6/2024) presenting with worsening shortness of breath. Patient was recently hospitalized at [facility] from January 27th 2025 to February 6th 2025 for seizure and influenza virus infection, which required intubation. He was sent to rehab (originally from home) from hospital. He returns due to sudden onset shortness of breath and worsening oxygenation. Of note, he tested positive for COVID-19       1- SHAGUFTA on CKDIII  2- covid-19  3- anemia  4- cold to touch RLE       SHAGUFTA suspected in setting of new infection. covid 19   cr is improving   seems to be stabilizing somewhat today   decadron 6 mg iv daily   remdisivir 100 mg iv daily   Right foot is very cold to touch  has PT/DP pulses to have doppler RLE d/w pt team on floor     anemia, trend hgb

## 2025-02-20 NOTE — PROGRESS NOTE ADULT - NS ATTEND AMEND GEN_ALL_CORE FT
to seems more alert and awke and responsive:  still requiring high flow at 70%:  would try to wean it down as possible:  now on heparin too : cont current rx:  as above

## 2025-02-20 NOTE — SWALLOW BEDSIDE ASSESSMENT ADULT - H & P REVIEW
69 year old male with a past medical history of hypertension, hyperlipemia VAZQUEZ (on CPAP at bedtime, NC 2 liters during the day), CKD stage 3, epilepsy, schizophrenia, developmental delay, metastatic testicular cancer (s/p orchiectomy, actively on chemotherapy, last dose of etoposide/cisplatin on 6/2024) presenting with worsening shortness of breath. Patient was recently hospitalized at [facility] from January 27th 2025 to February 6th 2025 for seizure and influenza virus infection, which required intubation. He was sent to rehab (originally from home) from hospital. He returns due to sudden onset shortness of breath and worsening oxygenation. Of note, he tested positive for COVID-19 at facility on 2/13/25 and was not put on any COVID-19 treatment at the time, only guaifenesin and scopolamine patch. Patient was placed on non-rebreather and sent to the hospital on 2/16/25./yes

## 2025-02-20 NOTE — PROGRESS NOTE ADULT - SUBJECTIVE AND OBJECTIVE BOX
hospitals HEMATOLOGY/ONCOLOGY INPATIENT PROGRESS NOTE     Interval Hx:   02-20-25: Mr. Gr was seen at bedside today.    Meds:   MEDICATIONS  (STANDING):  acetylcysteine 20%  Inhalation 3 milliLiter(s) Inhalation every 6 hours  albuterol/ipratropium for Nebulization 3 milliLiter(s) Nebulizer every 6 hours  atorvastatin 20 milliGRAM(s) Oral at bedtime  cholecalciferol 1000 Unit(s) Oral daily  dexAMETHasone  Injectable 6 milliGRAM(s) IV Push daily  escitalopram 15 milliGRAM(s) Oral with breakfast  gabapentin Solution 150 milliGRAM(s) Oral every 12 hours  heparin  Infusion.  Unit(s)/Hr (17 mL/Hr) IV Continuous <Continuous>  influenza  Vaccine (HIGH DOSE) 0.5 milliLiter(s) IntraMuscular once  lacosamide IVPB 200 milliGRAM(s) IV Intermittent every 12 hours  levETIRAcetam  IVPB 1000 milliGRAM(s) IV Intermittent <User Schedule>  lurasidone 40 milliGRAM(s) Oral at bedtime  metoprolol tartrate Injectable 5 milliGRAM(s) IV Push every 6 hours  pantoprazole    Tablet 40 milliGRAM(s) Oral before breakfast  piperacillin/tazobactam IVPB.. 3.375 Gram(s) IV Intermittent every 8 hours  polyethylene glycol 3350 17 Gram(s) Oral daily  remdesivir  IVPB   IV Intermittent   remdesivir  IVPB 100 milliGRAM(s) IV Intermittent every 24 hours  senna 2 Tablet(s) Oral at bedtime    MEDICATIONS  (PRN):  acetaminophen     Tablet .. 650 milliGRAM(s) Oral every 6 hours PRN Temp greater or equal to 38C (100.4F), Mild Pain (1 - 3)  aluminum hydroxide/magnesium hydroxide/simethicone Suspension 30 milliLiter(s) Oral every 4 hours PRN Dyspepsia  heparin   Injectable 7500 Unit(s) IV Push every 6 hours PRN For aPTT less than 40  heparin   Injectable 3500 Unit(s) IV Push every 6 hours PRN For aPTT between 40 - 57  melatonin 3 milliGRAM(s) Oral at bedtime PRN Insomnia  ondansetron Injectable 4 milliGRAM(s) IV Push every 8 hours PRN Nausea and/or Vomiting    Vital Signs Last 24 Hrs  T(C): 36.9 (19 Feb 2025 21:44), Max: 38.2 (19 Feb 2025 05:12)  T(F): 98.4 (19 Feb 2025 21:44), Max: 100.8 (19 Feb 2025 05:12)  HR: 87 (19 Feb 2025 23:43) (86 - 132)  BP: 147/82 (19 Feb 2025 23:43) (130/84 - 163/80)  BP(mean): --  RR: 20 (19 Feb 2025 21:44) (18 - 20)  SpO2: 93% (19 Feb 2025 21:44) (87% - 100%)    Parameters below as of 19 Feb 2025 21:44  Patient On (Oxygen Delivery Method): nasal cannula, high flow  O2 Flow (L/min): 60  O2 Concentration (%): 70    Physical Exam:  Gen: NAD  HEENT: EOMI, MMM  Chest: equal chest rise  Cardiac: regular   Abd: non distended   Neuro: AAOx3     Labs:                        8.6    5.83  )-----------( 95       ( 19 Feb 2025 17:51 )             27.2     CBC Full  -  ( 19 Feb 2025 17:51 )  WBC Count : 5.83 K/uL  RBC Count : 2.78 M/uL  Hemoglobin : 8.6 g/dL  Hematocrit : 27.2 %  Platelet Count - Automated : 95 K/uL  Mean Cell Volume : 97.8 fl  Mean Cell Hemoglobin : 30.9 pg  Mean Cell Hemoglobin Concentration : 31.6 g/dL    02-19    143  |  107  |  54[H]  ----------------------------<  121[H]  5.0   |  21[L]  |  2.64[H]    Ca    9.3      19 Feb 2025 08:57  Phos  2.6     02-19  Mg     2.3     02-19    TPro  6.3  /  Alb  3.1[L]  /  TBili  0.6  /  DBili  x   /  AST  71[H]  /  ALT  38  /  AlkPhos  134[H]  02-19    PT/INR - ( 19 Feb 2025 06:28 )   PT: 12.2 sec;   INR: 1.07 ratio         PTT - ( 19 Feb 2025 17:51 )  PTT:139.2 sec  Bilirubin Total: 0.6 mg/dL (02-19-25 @ 08:57)  Bilirubin Total: 0.4 mg/dL (02-19-25 @ 06:28)  Bilirubin Total: 0.3 mg/dL (02-19-25 @ 06:27)   [facility] HEMATOLOGY/ONCOLOGY INPATIENT PROGRESS NOTE     Interval Hx:   02-20-25: Mr. Gr was seen at bedside today, developed A.fib with RVR and was placed on heparin drip and pending cardiology eval    Meds:   MEDICATIONS  (STANDING):  acetylcysteine 20%  Inhalation 3 milliLiter(s) Inhalation every 6 hours  albuterol/ipratropium for Nebulization 3 milliLiter(s) Nebulizer every 6 hours  atorvastatin 20 milliGRAM(s) Oral at bedtime  cholecalciferol 1000 Unit(s) Oral daily  dexAMETHasone  Injectable 6 milliGRAM(s) IV Push daily  escitalopram 15 milliGRAM(s) Oral with breakfast  gabapentin Solution 150 milliGRAM(s) Oral every 12 hours  heparin  Infusion.  Unit(s)/Hr (17 mL/Hr) IV Continuous <Continuous>  influenza  Vaccine (HIGH DOSE) 0.5 milliLiter(s) IntraMuscular once  lacosamide IVPB 200 milliGRAM(s) IV Intermittent every 12 hours  levETIRAcetam  IVPB 1000 milliGRAM(s) IV Intermittent <User Schedule>  lurasidone 40 milliGRAM(s) Oral at bedtime  metoprolol tartrate Injectable 5 milliGRAM(s) IV Push every 6 hours  pantoprazole    Tablet 40 milliGRAM(s) Oral before breakfast  piperacillin/tazobactam IVPB.. 3.375 Gram(s) IV Intermittent every 8 hours  polyethylene glycol 3350 17 Gram(s) Oral daily  remdesivir  IVPB   IV Intermittent   remdesivir  IVPB 100 milliGRAM(s) IV Intermittent every 24 hours  senna 2 Tablet(s) Oral at bedtime    MEDICATIONS  (PRN):  acetaminophen     Tablet .. 650 milliGRAM(s) Oral every 6 hours PRN Temp greater or equal to 38C (100.4F), Mild Pain (1 - 3)  aluminum hydroxide/magnesium hydroxide/simethicone Suspension 30 milliLiter(s) Oral every 4 hours PRN Dyspepsia  heparin   Injectable 7500 Unit(s) IV Push every 6 hours PRN For aPTT less than 40  heparin   Injectable 3500 Unit(s) IV Push every 6 hours PRN For aPTT between 40 - 57  melatonin 3 milliGRAM(s) Oral at bedtime PRN Insomnia  ondansetron Injectable 4 milliGRAM(s) IV Push every 8 hours PRN Nausea and/or Vomiting    Vital Signs Last 24 Hrs  T(C): 36.9 (19 Feb 2025 21:44), Max: 38.2 (19 Feb 2025 05:12)  T(F): 98.4 (19 Feb 2025 21:44), Max: 100.8 (19 Feb 2025 05:12)  HR: 87 (19 Feb 2025 23:43) (86 - 132)  BP: 147/82 (19 Feb 2025 23:43) (130/84 - 163/80)  BP(mean): --  RR: 20 (19 Feb 2025 21:44) (18 - 20)  SpO2: 93% (19 Feb 2025 21:44) (87% - 100%)    Parameters below as of 19 Feb 2025 21:44  Patient On (Oxygen Delivery Method): nasal cannula, high flow  O2 Flow (L/min): 60  O2 Concentration (%): 70    Physical Exam:  Gen: NAD  HEENT: EOMI, MMM  Chest: equal chest rise  Cardiac: regular   Abd: non distended   Neuro: AAOx3     Labs:                        8.6    5.83  )-----------( 95       ( 19 Feb 2025 17:51 )             27.2     CBC Full  -  ( 19 Feb 2025 17:51 )  WBC Count : 5.83 K/uL  RBC Count : 2.78 M/uL  Hemoglobin : 8.6 g/dL  Hematocrit : 27.2 %  Platelet Count - Automated : 95 K/uL  Mean Cell Volume : 97.8 fl  Mean Cell Hemoglobin : 30.9 pg  Mean Cell Hemoglobin Concentration : 31.6 g/dL    02-19    143  |  107  |  54[H]  ----------------------------<  121[H]  5.0   |  21[L]  |  2.64[H]    Ca    9.3      19 Feb 2025 08:57  Phos  2.6     02-19  Mg     2.3     02-19    TPro  6.3  /  Alb  3.1[L]  /  TBili  0.6  /  DBili  x   /  AST  71[H]  /  ALT  38  /  AlkPhos  134[H]  02-19    PT/INR - ( 19 Feb 2025 06:28 )   PT: 12.2 sec;   INR: 1.07 ratio         PTT - ( 19 Feb 2025 17:51 )  PTT:139.2 sec  Bilirubin Total: 0.6 mg/dL (02-19-25 @ 08:57)  Bilirubin Total: 0.4 mg/dL (02-19-25 @ 06:28)  Bilirubin Total: 0.3 mg/dL (02-19-25 @ 06:27)

## 2025-02-20 NOTE — RAPID RESPONSE TEAM SUMMARY - NSSITUATIONBACKGROUNDRRT_GEN_ALL_CORE
69 year old male with a past medical history of hypertension, hyperlipemia VAZQUEZ (on CPAP at bedtime, NC 2 liters during the day), CKD stage 3, epilepsy, schizophrenia, developmental delay, metastatic testicular cancer (s/p orchiectomy, actively on chemotherapy, last dose of etoposide/cisplatin on 6/2024), presenting with acute on chronic hypoxemic respiratory failure, secondary to (a) COVID-19 infection, (b) superimposed pneumonia after recent influenza infection, and/or (c) fluid overload.   RRT called for hypoxia. Pt reportedly removed his HFNC. Unsure exactly when but within the last hour.

## 2025-02-21 LAB
ALBUMIN SERPL ELPH-MCNC: 2.8 G/DL — LOW (ref 3.3–5)
ALBUMIN SERPL ELPH-MCNC: 3.9 G/DL — SIGNIFICANT CHANGE UP (ref 3.3–5)
ALP SERPL-CCNC: 150 U/L — HIGH (ref 40–120)
ALP SERPL-CCNC: 59 U/L — SIGNIFICANT CHANGE UP (ref 40–120)
ALT FLD-CCNC: 13 U/L — SIGNIFICANT CHANGE UP (ref 10–45)
ALT FLD-CCNC: 32 U/L — SIGNIFICANT CHANGE UP (ref 10–45)
ANION GAP SERPL CALC-SCNC: 14 MMOL/L — SIGNIFICANT CHANGE UP (ref 5–17)
APTT BLD: 69 SEC — HIGH (ref 24.5–35.6)
AST SERPL-CCNC: 16 U/L — SIGNIFICANT CHANGE UP (ref 10–40)
AST SERPL-CCNC: 52 U/L — HIGH (ref 10–40)
BASE EXCESS BLDA CALC-SCNC: -2.9 MMOL/L — LOW (ref -2–3)
BASE EXCESS BLDA CALC-SCNC: -5.4 MMOL/L — LOW (ref -2–3)
BILIRUB DIRECT SERPL-MCNC: 0.1 MG/DL — SIGNIFICANT CHANGE UP (ref 0–0.3)
BILIRUB DIRECT SERPL-MCNC: 0.2 MG/DL — SIGNIFICANT CHANGE UP (ref 0–0.3)
BILIRUB INDIRECT FLD-MCNC: 0.3 MG/DL — SIGNIFICANT CHANGE UP (ref 0.2–1)
BILIRUB INDIRECT FLD-MCNC: 0.5 MG/DL — SIGNIFICANT CHANGE UP (ref 0.2–1)
BILIRUB SERPL-MCNC: 0.5 MG/DL — SIGNIFICANT CHANGE UP (ref 0.2–1.2)
BILIRUB SERPL-MCNC: 0.6 MG/DL — SIGNIFICANT CHANGE UP (ref 0.2–1.2)
BUN SERPL-MCNC: 60 MG/DL — HIGH (ref 7–23)
CALCIUM SERPL-MCNC: 9.6 MG/DL — SIGNIFICANT CHANGE UP (ref 8.4–10.5)
CHLORIDE SERPL-SCNC: 113 MMOL/L — HIGH (ref 96–108)
CO2 BLDA-SCNC: 23 MMOL/L — SIGNIFICANT CHANGE UP (ref 19–24)
CO2 BLDA-SCNC: 23 MMOL/L — SIGNIFICANT CHANGE UP (ref 19–24)
CO2 SERPL-SCNC: 21 MMOL/L — LOW (ref 22–31)
CREAT SERPL-MCNC: 0.68 MG/DL — SIGNIFICANT CHANGE UP (ref 0.5–1.3)
CREAT SERPL-MCNC: 2.44 MG/DL — HIGH (ref 0.5–1.3)
CREAT SERPL-MCNC: 2.54 MG/DL — HIGH (ref 0.5–1.3)
CULTURE RESULTS: SIGNIFICANT CHANGE UP
CULTURE RESULTS: SIGNIFICANT CHANGE UP
EGFR: 102 ML/MIN/1.73M2 — SIGNIFICANT CHANGE UP
EGFR: 102 ML/MIN/1.73M2 — SIGNIFICANT CHANGE UP
EGFR: 27 ML/MIN/1.73M2 — LOW
EGFR: 27 ML/MIN/1.73M2 — LOW
EGFR: 28 ML/MIN/1.73M2 — LOW
EGFR: 28 ML/MIN/1.73M2 — LOW
GAS PNL BLDA: SIGNIFICANT CHANGE UP
GAS PNL BLDV: SIGNIFICANT CHANGE UP
GLUCOSE BLDC GLUCOMTR-MCNC: 121 MG/DL — HIGH (ref 70–99)
GLUCOSE SERPL-MCNC: 172 MG/DL — HIGH (ref 70–99)
HCO3 BLDA-SCNC: 22 MMOL/L — SIGNIFICANT CHANGE UP (ref 21–28)
HCO3 BLDA-SCNC: 22 MMOL/L — SIGNIFICANT CHANGE UP (ref 21–28)
HCT VFR BLD CALC: 28.1 % — LOW (ref 39–50)
HCT VFR BLD CALC: 34.3 % — LOW (ref 39–50)
HGB BLD-MCNC: 10.6 G/DL — LOW (ref 13–17)
HGB BLD-MCNC: 8.7 G/DL — LOW (ref 13–17)
HOROWITZ INDEX BLDA+IHG-RTO: 100 — SIGNIFICANT CHANGE UP
HOROWITZ INDEX BLDA+IHG-RTO: 70 — SIGNIFICANT CHANGE UP
INR BLD: 1.04 RATIO — SIGNIFICANT CHANGE UP (ref 0.85–1.16)
INR BLD: 1.06 RATIO — SIGNIFICANT CHANGE UP (ref 0.85–1.16)
MAGNESIUM SERPL-MCNC: 2.3 MG/DL — SIGNIFICANT CHANGE UP (ref 1.6–2.6)
MCHC RBC-ENTMCNC: 30.9 G/DL — LOW (ref 32–36)
MCHC RBC-ENTMCNC: 30.9 PG — SIGNIFICANT CHANGE UP (ref 27–34)
MCHC RBC-ENTMCNC: 31 G/DL — LOW (ref 32–36)
MCHC RBC-ENTMCNC: 31.6 PG — SIGNIFICANT CHANGE UP (ref 27–34)
MCV RBC AUTO: 102.4 FL — HIGH (ref 80–100)
MCV RBC AUTO: 99.6 FL — SIGNIFICANT CHANGE UP (ref 80–100)
NRBC BLD AUTO-RTO: 0 /100 WBCS — SIGNIFICANT CHANGE UP (ref 0–0)
NRBC BLD AUTO-RTO: 0 /100 WBCS — SIGNIFICANT CHANGE UP (ref 0–0)
PCO2 BLDA: 38 MMHG — SIGNIFICANT CHANGE UP (ref 35–48)
PCO2 BLDA: 47 MMHG — SIGNIFICANT CHANGE UP (ref 35–48)
PH BLDA: 7.27 — LOW (ref 7.35–7.45)
PH BLDA: 7.37 — SIGNIFICANT CHANGE UP (ref 7.35–7.45)
PHOSPHATE SERPL-MCNC: 4.1 MG/DL — SIGNIFICANT CHANGE UP (ref 2.5–4.5)
PLATELET # BLD AUTO: 102 K/UL — LOW (ref 150–400)
PLATELET # BLD AUTO: 114 K/UL — LOW (ref 150–400)
PO2 BLDA: 101 MMHG — SIGNIFICANT CHANGE UP (ref 83–108)
PO2 BLDA: 222 MMHG — HIGH (ref 83–108)
POTASSIUM SERPL-MCNC: 4.7 MMOL/L — SIGNIFICANT CHANGE UP (ref 3.5–5.3)
POTASSIUM SERPL-SCNC: 4.7 MMOL/L — SIGNIFICANT CHANGE UP (ref 3.5–5.3)
PROT SERPL-MCNC: 6.4 G/DL — SIGNIFICANT CHANGE UP (ref 6–8.3)
PROT SERPL-MCNC: 7 G/DL — SIGNIFICANT CHANGE UP (ref 6–8.3)
PROTHROM AB SERPL-ACNC: 11.8 SEC — SIGNIFICANT CHANGE UP (ref 9.9–13.4)
PROTHROM AB SERPL-ACNC: 12.2 SEC — SIGNIFICANT CHANGE UP (ref 9.9–13.4)
RBC # BLD: 2.82 M/UL — LOW (ref 4.2–5.8)
RBC # BLD: 3.35 M/UL — LOW (ref 4.2–5.8)
RBC # FLD: 14.6 % — HIGH (ref 10.3–14.5)
RBC # FLD: 14.8 % — HIGH (ref 10.3–14.5)
SAO2 % BLDA: 99.1 % — HIGH (ref 94–98)
SAO2 % BLDA: 99.1 % — HIGH (ref 94–98)
SODIUM SERPL-SCNC: 148 MMOL/L — HIGH (ref 135–145)
SPECIMEN SOURCE: SIGNIFICANT CHANGE UP
SPECIMEN SOURCE: SIGNIFICANT CHANGE UP
WBC # BLD: 7.85 K/UL — SIGNIFICANT CHANGE UP (ref 3.8–10.5)
WBC # BLD: 8.68 K/UL — SIGNIFICANT CHANGE UP (ref 3.8–10.5)
WBC # FLD AUTO: 7.85 K/UL — SIGNIFICANT CHANGE UP (ref 3.8–10.5)
WBC # FLD AUTO: 8.68 K/UL — SIGNIFICANT CHANGE UP (ref 3.8–10.5)

## 2025-02-21 PROCEDURE — 93970 EXTREMITY STUDY: CPT | Mod: 26

## 2025-02-21 PROCEDURE — 99223 1ST HOSP IP/OBS HIGH 75: CPT | Mod: GC

## 2025-02-21 PROCEDURE — 71045 X-RAY EXAM CHEST 1 VIEW: CPT | Mod: 26

## 2025-02-21 RX ORDER — ACETYLCYSTEINE 200 MG/ML
3 INHALANT RESPIRATORY (INHALATION) EVERY 6 HOURS
Refills: 0 | Status: COMPLETED | OUTPATIENT
Start: 2025-02-21 | End: 2025-02-24

## 2025-02-21 RX ORDER — ACETAMINOPHEN 500 MG/5ML
1000 LIQUID (ML) ORAL ONCE
Refills: 0 | Status: COMPLETED | OUTPATIENT
Start: 2025-02-21 | End: 2025-02-21

## 2025-02-21 RX ADMIN — IPRATROPIUM BROMIDE AND ALBUTEROL SULFATE 3 MILLILITER(S): .5; 2.5 SOLUTION RESPIRATORY (INHALATION) at 05:36

## 2025-02-21 RX ADMIN — METOPROLOL SUCCINATE 5 MILLIGRAM(S): 50 TABLET, EXTENDED RELEASE ORAL at 18:33

## 2025-02-21 RX ADMIN — LEVETIRACETAM 400 MILLIGRAM(S): 10 INJECTION, SOLUTION INTRAVENOUS at 05:36

## 2025-02-21 RX ADMIN — IPRATROPIUM BROMIDE AND ALBUTEROL SULFATE 3 MILLILITER(S): .5; 2.5 SOLUTION RESPIRATORY (INHALATION) at 23:05

## 2025-02-21 RX ADMIN — Medication 25 GRAM(S): at 13:10

## 2025-02-21 RX ADMIN — LEVETIRACETAM 400 MILLIGRAM(S): 10 INJECTION, SOLUTION INTRAVENOUS at 21:08

## 2025-02-21 RX ADMIN — ACETYLCYSTEINE 3 MILLILITER(S): 200 INHALANT RESPIRATORY (INHALATION) at 13:02

## 2025-02-21 RX ADMIN — DEXAMETHASONE 6 MILLIGRAM(S): 0.5 TABLET ORAL at 05:36

## 2025-02-21 RX ADMIN — METOPROLOL SUCCINATE 5 MILLIGRAM(S): 50 TABLET, EXTENDED RELEASE ORAL at 00:38

## 2025-02-21 RX ADMIN — ACETYLCYSTEINE 3 MILLILITER(S): 200 INHALANT RESPIRATORY (INHALATION) at 23:05

## 2025-02-21 RX ADMIN — REMDESIVIR 200 MILLIGRAM(S): 5 INJECTION INTRAVENOUS at 18:32

## 2025-02-21 RX ADMIN — IPRATROPIUM BROMIDE AND ALBUTEROL SULFATE 3 MILLILITER(S): .5; 2.5 SOLUTION RESPIRATORY (INHALATION) at 00:29

## 2025-02-21 RX ADMIN — IPRATROPIUM BROMIDE AND ALBUTEROL SULFATE 3 MILLILITER(S): .5; 2.5 SOLUTION RESPIRATORY (INHALATION) at 12:31

## 2025-02-21 RX ADMIN — HEPARIN SODIUM 900 UNIT(S)/HR: 1000 INJECTION INTRAVENOUS; SUBCUTANEOUS at 06:46

## 2025-02-21 RX ADMIN — LACOSAMIDE 140 MILLIGRAM(S): 150 TABLET, FILM COATED ORAL at 18:33

## 2025-02-21 RX ADMIN — IPRATROPIUM BROMIDE AND ALBUTEROL SULFATE 3 MILLILITER(S): .5; 2.5 SOLUTION RESPIRATORY (INHALATION) at 18:34

## 2025-02-21 RX ADMIN — METOPROLOL SUCCINATE 5 MILLIGRAM(S): 50 TABLET, EXTENDED RELEASE ORAL at 23:05

## 2025-02-21 RX ADMIN — Medication 1000 UNIT(S): at 12:31

## 2025-02-21 RX ADMIN — METOPROLOL SUCCINATE 5 MILLIGRAM(S): 50 TABLET, EXTENDED RELEASE ORAL at 05:36

## 2025-02-21 RX ADMIN — GABAPENTIN 150 MILLIGRAM(S): 400 CAPSULE ORAL at 18:32

## 2025-02-21 RX ADMIN — Medication 400 MILLIGRAM(S): at 21:23

## 2025-02-21 RX ADMIN — ACETYLCYSTEINE 3 MILLILITER(S): 200 INHALANT RESPIRATORY (INHALATION) at 18:33

## 2025-02-21 RX ADMIN — Medication 25 MILLIGRAM(S): at 13:02

## 2025-02-21 RX ADMIN — Medication 25 GRAM(S): at 21:05

## 2025-02-21 RX ADMIN — Medication 25 GRAM(S): at 05:35

## 2025-02-21 RX ADMIN — METOPROLOL SUCCINATE 5 MILLIGRAM(S): 50 TABLET, EXTENDED RELEASE ORAL at 12:31

## 2025-02-21 RX ADMIN — LACOSAMIDE 140 MILLIGRAM(S): 150 TABLET, FILM COATED ORAL at 06:39

## 2025-02-21 RX ADMIN — LEVETIRACETAM 400 MILLIGRAM(S): 10 INJECTION, SOLUTION INTRAVENOUS at 13:10

## 2025-02-21 RX ADMIN — Medication 1000 MILLIGRAM(S): at 22:00

## 2025-02-21 RX ADMIN — Medication 10 MILLIGRAM(S): at 21:23

## 2025-02-21 NOTE — CHART NOTE - NSCHARTNOTEFT_GEN_A_CORE
Patient is a 69y old  Male who presents with a chief complaint of Acute on chronic hypoxemic respiratory failure (21 Feb 2025 20:24)    Patient with increase work of breathing while on HiFlow.  Per Dr. Hernandez will get ABG done then placed patient back on AVAP  Discuss with Dr. Hernandez results of ABG patient is still tachypneic  iwht increase work of breathing, recommend for CU consult.  Chest xray ordered. ICU consult called with recommendation to call an RRT       Vital Signs Last 24 Hrs  T(C): 36.6 (21 Feb 2025 20:57), Max: 37.1 (21 Feb 2025 04:30)  T(F): 97.8 (21 Feb 2025 20:57), Max: 98.7 (21 Feb 2025 04:30)  HR: 84 (21 Feb 2025 20:57) (60 - 100)  BP: 163/79 (21 Feb 2025 20:57) (136/85 - 163/79)  BP(mean): --  RR: 22 (21 Feb 2025 20:57) (22 - 38)  SpO2: 98% (21 Feb 2025 20:57) (93% - 100%)    Parameters below as of 21 Feb 2025 20:57  Patient On (Oxygen Delivery Method): nasal cannula, high flow        Physical Exam:  General: WN/WD NAD  Neurology: A&Ox3, nonfocal, COLE x 4  Head:  Normocephalic, atraumatic  Respiratory: CTA B/L  CV: RRR, S1S2, no murmur  Abdominal: Soft, NT, ND no palpable mass  MSK: No edema, + peripheral pulses, FROM all 4 extremity  Labs:                          8.7    7.85  )-----------( 102      ( 21 Feb 2025 19:44 )             28.1     02-21    x   |  x   |  x   ----------------------------<  x   x    |  x   |  2.54[H]    Ca    9.6      21 Feb 2025 14:13  Phos  4.1     02-21  Mg     2.3     02-21    TPro  6.4  /  Alb  2.8[L]  /  TBili  0.5  /  DBili  0.2  /  AST  52[H]  /  ALT  32  /  AlkPhos  150[H]  02-21        ABG - ( 21 Feb 2025 19:38 )  pH, Arterial: 7.34  pH, Blood: x     /  pCO2: 40    /  pO2: 120   / HCO3: 22    / Base Excess: -3.9  /  SaO2: 99.7      Radiology:    HPI:  [facility] Division of Hospital Medicine  Cas Leung MD  Available via MS Teams    69 year old male with a past medical history of hypertension, hyperlipemia VAZQUEZ (on CPAP at bedtime, NC 2 liters during the day), CKD stage 3, epilepsy, schizophrenia, developmental delay, metastatic testicular cancer (s/p orchiectomy, actively on chemotherapy, last dose of etoposide/cisplatin on 6/2024) presenting with worsening shortness of breath.   Patient was recently hospitalized at [facility] from January 27th 2025 to February 6th 2025 for seizure and influenza virus infection, which required intubation. He was sent to rehab (originally from home) from hospital.   He returns due to sudden onset shortness of breath and worsening oxygenation. Of note, he tested positive for COVID-19 at facility on 2/13/25 and was not put on any COVID-19 treatment at the time, only guaifenesin and scopolamine patch. Patient was placed on non-rebreather and sent to the hospital on 2/16/25.   In the ER, vital signs significant for T-max 101.9F, HR 84-90. WBC 6.8. Hemoglobin 9.1. Platelet 87. AST 70. ALT 48. Cr 3.27. BUN 45. pH 7.30, pCO2 48. UA negative for infection. Patient given vancomycin, Zosyn, albuterol, and  cc bolus. Patient admitted to the general medicine service for further care. Patient evaluated at bedside during admission. Unable to talk with the patient given that he is on BIPAP. Attempted to take the patient off BIPAP while in the room, but patient's work of breathing immediately began to increase and patient endorsed shortness of breath, so he was placed back on. History taken by chart review.  (16 Feb 2025 13:16)    Patient seen this evening noted with increase work of breathing, increase RR to 37.  02 sat 94%, Chest x-ray done , Patient states he feels very SOB while on AVAPS   RRT called     Rapid response team at bedside, see flow sheet for interventions  Labs drawn for ABG, CBC, BMP, Lactate, PT/APTT/INR, Blood Cx x 2   > ICU  team in for evaluation for admission to ICU   > currently patient remains on 6T , current AVAPs setting - no changes   > Continue monitoring patient 's status   > Discuss plan of care with patient's sister who remained near by doing the RRT   > Call to Dr. Fisher to update on patient's RRT and current status    Ongoing evaluation   Report given to oncoming team

## 2025-02-21 NOTE — PROGRESS NOTE ADULT - SUBJECTIVE AND OBJECTIVE BOX
Date of Service: 02-21-25 @ 10:50    Patient is a 69y old  Male who presents with a chief complaint of Acute on chronic hypoxemic respiratory failure (21 Feb 2025 07:19)      Any change in ROS:   Events noted - s/p RRT for hypoxia 2/20 (reportedly HFNC had been dislodged, sats improved on AVAPS)  Pt awake & alert this AM on AVAPS, breathing nonlabored  Placed on HFNC 50L/70% - sats remain high 90s     MEDICATIONS  (STANDING):  albuterol/ipratropium for Nebulization 3 milliLiter(s) Nebulizer every 6 hours  amLODIPine   Tablet 5 milliGRAM(s) Oral daily  atorvastatin 20 milliGRAM(s) Oral at bedtime  cholecalciferol 1000 Unit(s) Oral daily  dexAMETHasone  Injectable 6 milliGRAM(s) IV Push daily  escitalopram 15 milliGRAM(s) Oral with breakfast  gabapentin Solution 150 milliGRAM(s) Oral every 12 hours  heparin  Infusion.  Unit(s)/Hr (17 mL/Hr) IV Continuous <Continuous>  hydrALAZINE 25 milliGRAM(s) Oral three times a day  influenza  Vaccine (HIGH DOSE) 0.5 milliLiter(s) IntraMuscular once  lacosamide IVPB 200 milliGRAM(s) IV Intermittent every 12 hours  levETIRAcetam  IVPB 1000 milliGRAM(s) IV Intermittent <User Schedule>  lurasidone 40 milliGRAM(s) Oral at bedtime  metoprolol tartrate Injectable 5 milliGRAM(s) IV Push every 6 hours  pantoprazole    Tablet 40 milliGRAM(s) Oral before breakfast  piperacillin/tazobactam IVPB.. 3.375 Gram(s) IV Intermittent every 8 hours  polyethylene glycol 3350 17 Gram(s) Oral daily  remdesivir  IVPB   IV Intermittent   remdesivir  IVPB 100 milliGRAM(s) IV Intermittent every 24 hours  senna 2 Tablet(s) Oral at bedtime    MEDICATIONS  (PRN):  acetaminophen     Tablet .. 650 milliGRAM(s) Oral every 6 hours PRN Temp greater or equal to 38C (100.4F), Mild Pain (1 - 3)  aluminum hydroxide/magnesium hydroxide/simethicone Suspension 30 milliLiter(s) Oral every 4 hours PRN Dyspepsia  heparin   Injectable 7500 Unit(s) IV Push every 6 hours PRN For aPTT less than 40  heparin   Injectable 3500 Unit(s) IV Push every 6 hours PRN For aPTT between 40 - 57  melatonin 3 milliGRAM(s) Oral at bedtime PRN Insomnia  ondansetron Injectable 4 milliGRAM(s) IV Push every 8 hours PRN Nausea and/or Vomiting    Vital Signs Last 24 Hrs  T(C): 37.1 (21 Feb 2025 04:30), Max: 37.1 (21 Feb 2025 04:30)  T(F): 98.7 (21 Feb 2025 04:30), Max: 98.7 (21 Feb 2025 04:30)  HR: 82 (21 Feb 2025 06:25) (54 - 134)  BP: 148/91 (21 Feb 2025 04:30) (146/90 - 171/100)  BP(mean): --  RR: 24 (21 Feb 2025 04:30) (20 - 38)  SpO2: 100% (21 Feb 2025 04:30) (91% - 100%)    Parameters below as of 21 Feb 2025 04:30  Patient On (Oxygen Delivery Method): BiPAP/CPAP        I&O's Summary    20 Feb 2025 07:01  -  21 Feb 2025 07:00  --------------------------------------------------------  IN: 0 mL / OUT: 1100 mL / NET: -1100 mL          Physical Exam:   GENERAL: NAD, well-groomed, well-developed  HEENT: BREE/   Atraumatic, Normocephalic  ENMT: No tonsillar erythema, exudates, or enlargement; Moist mucous membranes, Good dentition, No lesions  NECK: Supple, No JVD, Normal thyroid  CHEST/LUNG: few scattered rhonchi, no wheeze   CVS: Regular rate and rhythm; No murmurs, rubs, or gallops  GI: : Soft, Nontender, Nondistended; Bowel sounds present  NERVOUS SYSTEM:  Alert & Oriented X3  EXTREMITIES:  2+ Peripheral Pulses, No clubbing, cyanosis, or edema  LYMPH: No lymphadenopathy noted  SKIN: No rashes or lesions  ENDOCRINOLOGY: No Thyromegaly  PSYCH: Appropriate    Labs:  ABG - ( 21 Feb 2025 00:00 )  pH, Arterial: 7.27  pH, Blood: x     /  pCO2: 47    /  pO2: 222   / HCO3: 22    / Base Excess: -5.4  /  SaO2: 99.1            23, 17, 22, 24, 24                            10.6   8.68  )-----------( 114      ( 21 Feb 2025 06:30 )             34.3                         8.7    5.88  )-----------( 93       ( 20 Feb 2025 06:17 )             27.6                         8.6    5.83  )-----------( 95       ( 19 Feb 2025 17:51 )             27.2                         8.7    6.09  )-----------( 84       ( 19 Feb 2025 08:44 )             27.7                         7.2    5.67  )-----------( 79       ( 19 Feb 2025 06:28 )             23.9                         9.3    6.65  )-----------( 94       ( 18 Feb 2025 05:02 )             30.1     02-21    x   |  x   |  x   ----------------------------<  x   x    |  x   |  0.68  02-20    142  |  106  |  57[H]  ----------------------------<  181[H]  4.3   |  19[L]  |  2.31[H]  02-19    143  |  107  |  54[H]  ----------------------------<  121[H]  5.0   |  21[L]  |  2.64[H]  02-19    117[LL]  |  88[L]  |  42[H]  ----------------------------<  818[HH]  3.9   |  15[L]  |  2.09[H]  02-19    x   |  x   |  x   ----------------------------<  x   x    |  x   |  1.95[H]  02-18    143  |  108  |  63[H]  ----------------------------<  110[H]  4.5   |  21[L]  |  3.11[H]    Ca    9.2      20 Feb 2025 06:17  Mg     2.2     02-20    TPro  7.0  /  Alb  3.9  /  TBili  0.6  /  DBili  0.1  /  AST  16  /  ALT  13  /  AlkPhos  59  02-21  TPro  6.1  /  Alb  2.8[L]  /  TBili  0.5  /  DBili  x   /  AST  61[H]  /  ALT  33  /  AlkPhos  131[H]  02-20  TPro  6.3  /  Alb  3.1[L]  /  TBili  0.6  /  DBili  x   /  AST  71[H]  /  ALT  38  /  AlkPhos  134[H]  02-19  TPro  4.7[L]  /  Alb  2.4[L]  /  TBili  0.4  /  DBili  x   /  AST  55[H]  /  ALT  31  /  AlkPhos  99  02-19  TPro  4.3[L]  /  Alb  2.1[L]  /  TBili  0.3  /  DBili  0.2  /  AST  50[H]  /  ALT  27  /  AlkPhos  92  02-19  TPro  6.0  /  Alb  2.9[L]  /  TBili  0.3  /  DBili  x   /  AST  70[H]  /  ALT  46[H]  /  AlkPhos  127[H]  02-18    CAPILLARY BLOOD GLUCOSE      POCT Blood Glucose.: 150 mg/dL (20 Feb 2025 19:00)      LIVER FUNCTIONS - ( 21 Feb 2025 01:02 )  Alb: 3.9 g/dL / Pro: 7.0 g/dL / ALK PHOS: 59 U/L / ALT: 13 U/L / AST: 16 U/L / GGT: x           PT/INR - ( 21 Feb 2025 01:02 )   PT: 11.8 sec;   INR: 1.04 ratio         PTT - ( 21 Feb 2025 06:30 )  PTT:69.0 sec  Urinalysis Basic - ( 20 Feb 2025 06:17 )    Color: x / Appearance: x / SG: x / pH: x  Gluc: 181 mg/dL / Ketone: x  / Bili: x / Urobili: x   Blood: x / Protein: x / Nitrite: x   Leuk Esterase: x / RBC: x / WBC x   Sq Epi: x / Non Sq Epi: x / Bacteria: x            RECENT CULTURES:  02-19 @ 00:18 .Blood Blood-Peripheral                No growth at 48 Hours    02-17 @ 22:23 .Sputum Sputum       Few polymorphonuclear leukocytes per low power field  No Squamous epithelial cells per low power field  Few Gram Variable Rods seen per oil power field  Rare Gram positive cocci in pairs seen per oil power field           Commensal lucia consistent with body site    02-16 @ 11:47 Clean Catch Clean Catch (Midstream)   MARIELENA      Klebsiella pneumoniae  Klebsiella pneumoniae     10,000 - 49,000 CFU/mL Klebsiella pneumoniae    02-16 @ 09:35 .Blood BLOOD                No growth at 4 days    02-16 @ 09:25 .Blood BLOOD                No growth at 4 days          Studies  < from: CT Chest No Cont (02.17.25 @ 16:41) >    ACC: [id number] EXAM:  CT CHEST   ORDERED BY: GERSON MORTON     PROCEDURE DATE:  02/17/2025          INTERPRETATION:  INDICATION: Pneumonia, Covid.    Axial CT images of the chest are obtained without intravenous   administration of contrast.    COMPARISON: Chest CT of January 30, 2025.    Right chest wall Mediport as on the prior study.    LYMPH NODES/MEDIASTINUM: No lymphadenopathy.    VASCULATURE/HEART: No pericardial effusion. Vascular calcifications with   involvement of the aorta and the coronary arteries. Heart size appears   enlarged.    UPPER ABDOMEN: Hypodensity within the partially imaged right kidney   suggestive of a cyst.    LUNGS/PLEURA: Limited evaluation due to significant respiratory motion   artifact.    No pleural effusions.    Interval reaeration of the right lower lobe since January 30, 2025 with   residual linear opacities suggestive of subsegmental atelectasis.    Other bilateral mid to upper lung predominant groundglass opacities with   involvement of all 5 lobes majority of which are new since January 30, 2025.    Collapse of the trachea on this expiratory CT representing   tracheomalacia. Secretions within the trachea.    CHEST WALL: Spinal Degenerative Changes. Lower spinal surgery with   partially imagedhardware. Old healed bilateral rib fractures. Old right   clavicular fracture.    IMPRESSION: Limited chest CT due to respiratory motion artifact.    Extensive bilateral groundglass opacities majority of which are new since   January 30, 2025, nonspecific finding may correspond to the given history   of Covid pneumonia.    Interval reaeration of the right lower lobe since January 30, 2025.    Tracheomalacia.    --- End of Report ---          < end of copied text >

## 2025-02-21 NOTE — PROGRESS NOTE ADULT - SUBJECTIVE AND OBJECTIVE BOX
[facility]   [phone]  RENAL FOLLOW UP NOTE  --------------------------------------------------------------------------------  Chief Complaint:    24 hour events/subjective:    seen earlier   on high flow O2   states breathing is slightly better and states is eating     PAST HISTORY  --------------------------------------------------------------------------------  No significant changes to PMH, PSH, FHx, SHx, unless otherwise noted    ALLERGIES & MEDICATIONS  --------------------------------------------------------------------------------  Allergies    seasonal allergies (Unknown)  penicillin (Hives)    Intolerances    latex (Rash)    Standing Inpatient Medications  acetylcysteine 20%  Inhalation 3 milliLiter(s) Inhalation every 6 hours  albuterol/ipratropium for Nebulization 3 milliLiter(s) Nebulizer every 6 hours  amLODIPine   Tablet 5 milliGRAM(s) Oral daily  atorvastatin 20 milliGRAM(s) Oral at bedtime  cholecalciferol 1000 Unit(s) Oral daily  dexAMETHasone  Injectable 6 milliGRAM(s) IV Push daily  escitalopram 15 milliGRAM(s) Oral with breakfast  gabapentin Solution 150 milliGRAM(s) Oral every 12 hours  heparin  Infusion.  Unit(s)/Hr IV Continuous <Continuous>  hydrALAZINE 25 milliGRAM(s) Oral three times a day  influenza  Vaccine (HIGH DOSE) 0.5 milliLiter(s) IntraMuscular once  lacosamide IVPB 200 milliGRAM(s) IV Intermittent every 12 hours  levETIRAcetam  IVPB 1000 milliGRAM(s) IV Intermittent <User Schedule>  lurasidone 40 milliGRAM(s) Oral at bedtime  metoprolol tartrate Injectable 5 milliGRAM(s) IV Push every 6 hours  pantoprazole    Tablet 40 milliGRAM(s) Oral before breakfast  piperacillin/tazobactam IVPB.. 3.375 Gram(s) IV Intermittent every 8 hours  polyethylene glycol 3350 17 Gram(s) Oral daily  remdesivir  IVPB   IV Intermittent   remdesivir  IVPB 100 milliGRAM(s) IV Intermittent every 24 hours  senna 2 Tablet(s) Oral at bedtime    PRN Inpatient Medications  acetaminophen     Tablet .. 650 milliGRAM(s) Oral every 6 hours PRN  aluminum hydroxide/magnesium hydroxide/simethicone Suspension 30 milliLiter(s) Oral every 4 hours PRN  heparin   Injectable 7500 Unit(s) IV Push every 6 hours PRN  heparin   Injectable 3500 Unit(s) IV Push every 6 hours PRN  melatonin 3 milliGRAM(s) Oral at bedtime PRN  ondansetron Injectable 4 milliGRAM(s) IV Push every 8 hours PRN      REVIEW OF SYSTEMS  --------------------------------------------------------------------------------    Gen: denies  fevers/chills, or HA or dizziness  CVS: denies chest pain/palpitations  Resp:  +  SOB/Cough  GI: Denies N/V/Abd pain  : Denies dysuria    VITALS/PHYSICAL EXAM  --------------------------------------------------------------------------------  T(C): 36.6 (02-21-25 @ 20:57), Max: 37.1 (02-21-25 @ 04:30)  HR: 84 (02-21-25 @ 20:57) (60 - 100)  BP: 163/79 (02-21-25 @ 20:57) (136/85 - 163/79)  RR: 22 (02-21-25 @ 20:57) (22 - 24)  SpO2: 98% (02-21-25 @ 20:57) (93% - 100%)  Wt(kg): --        02-20-25 @ 07:01  -  02-21-25 @ 07:00  --------------------------------------------------------  IN: 0 mL / OUT: 1100 mL / NET: -1100 mL    02-21-25 @ 07:01  -  02-21-25 @ 23:12  --------------------------------------------------------  IN: 240 mL / OUT: 900 mL / NET: -660 mL      Physical Exam:  	    Gen: ill appearing male, on high flow O2   	Pulm: decrease bs, +coarse   	CV: No JVD. RRR, S1S2; no rub  	Abd: +BS, soft, nontender/nondistended  	: No suprapubic tenderness, external catheter  	UE: Warm, no cyanosis  no clubbing,  no edema;  	LE: Warm, no cyanosis  no clubbing, no edema  	Neuro: alert and oriented.       LABS/STUDIES  --------------------------------------------------------------------------------              8.7    7.85  >-----------<  102      [02-21-25 @ 19:44]              28.1     x   |  x   |  x   ----------------------------<  x       [02-21-25 @ 19:36]  x    |  x   |  2.54        Ca     9.6     [02-21-25 @ 14:13]      Mg     2.3     [02-21-25 @ 19:36]      Phos  4.1     [02-21-25 @ 19:36]    TPro  6.4  /  Alb  2.8  /  TBili  0.5  /  DBili  0.2  /  AST  52  /  ALT  32  /  AlkPhos  150  [02-21-25 @ 19:36]    PT/INR: PT 12.2 , INR 1.06       [02-21-25 @ 19:36]  PTT: 69.0       [02-21-25 @ 06:30]      Creatinine Trend:  SCr 2.54 [02-21 @ 19:36]  SCr 2.44 [02-21 @ 14:13]  SCr 0.68 [02-21 @ 01:02]  SCr 2.31 [02-20 @ 06:17]  SCr 2.64 [02-19 @ 08:57]          Urine Creatinine 159      [02-16-25 @ 13:44]  Urine Urea Nitrogen 655      [02-16-25 @ 13:44]    TSH 0.87      [01-25-25 @ 11:31]

## 2025-02-21 NOTE — RAPID RESPONSE TEAM SUMMARY - NSADDTLFINDINGSRRT_GEN_ALL_CORE
Pt tachypneic on AVAPS but saying he is not working harder than usual to breathe. Pulling appropriate volumes. CXRs reviewed with worsening opacity since admission; he is on remdesivir and dexamethasone for covid. Of note he has been intubated twice, including recently for pneumonia. MICU consulted who recommended additional blood gas, which showed improvement in oxygenation and CO2 retention. Given acceptable work of breathing and blood gas, pt will remain on 6T on AVAPS, and primary team and MICU will be in touch throughout the night if any change in status should occur. All parties in agreement. 
Pt tachypneic, AVAPS placed with improvement in work of breathing. Pt quickly more comfortable breathing. ABG and CXR ordered; primary team to follow up. Pt already on proper covid management. O2 saturations mid-high 90s after AVAPS placed. To continue with NIPPV overnight.

## 2025-02-21 NOTE — PROGRESS NOTE ADULT - SUBJECTIVE AND OBJECTIVE BOX
SUBJECTIVE/ OVERNIGHT EVENTS:  comfortable on hi-flow  awake alert  FEES today  denied pain  no cp, no sob, no n/v/d. no abdominal pain.  no headache, no dizziness.       --------------------------------------------------------------------------------------------  LABS:                        10.6   8.68  )-----------( 114      ( 21 Feb 2025 06:30 )             34.3     02-21    148[H]  |  113[H]  |  60[H]  ----------------------------<  172[H]  4.7   |  21[L]  |  2.44[H]    Ca    9.6      21 Feb 2025 14:13  Mg     2.2     02-20    TPro  7.0  /  Alb  3.9  /  TBili  0.6  /  DBili  0.1  /  AST  16  /  ALT  13  /  AlkPhos  59  02-21    PT/INR - ( 21 Feb 2025 01:02 )   PT: 11.8 sec;   INR: 1.04 ratio         PTT - ( 21 Feb 2025 06:30 )  PTT:69.0 sec  CAPILLARY BLOOD GLUCOSE      POCT Blood Glucose.: 150 mg/dL (20 Feb 2025 19:00)        Urinalysis Basic - ( 21 Feb 2025 14:13 )    Color: x / Appearance: x / SG: x / pH: x  Gluc: 172 mg/dL / Ketone: x  / Bili: x / Urobili: x   Blood: x / Protein: x / Nitrite: x   Leuk Esterase: x / RBC: x / WBC x   Sq Epi: x / Non Sq Epi: x / Bacteria: x        RADIOLOGY & ADDITIONAL TESTS:    Imaging Personally Reviewed:  [x] YES  [ ] NO    Consultant(s) Notes Reviewed:  [x] YES  [ ] NO    MEDICATIONS  (STANDING):  acetylcysteine 20%  Inhalation 3 milliLiter(s) Inhalation every 6 hours  albuterol/ipratropium for Nebulization 3 milliLiter(s) Nebulizer every 6 hours  amLODIPine   Tablet 5 milliGRAM(s) Oral daily  atorvastatin 20 milliGRAM(s) Oral at bedtime  cholecalciferol 1000 Unit(s) Oral daily  dexAMETHasone  Injectable 6 milliGRAM(s) IV Push daily  escitalopram 15 milliGRAM(s) Oral with breakfast  gabapentin Solution 150 milliGRAM(s) Oral every 12 hours  heparin  Infusion.  Unit(s)/Hr (17 mL/Hr) IV Continuous <Continuous>  hydrALAZINE 25 milliGRAM(s) Oral three times a day  influenza  Vaccine (HIGH DOSE) 0.5 milliLiter(s) IntraMuscular once  lacosamide IVPB 200 milliGRAM(s) IV Intermittent every 12 hours  levETIRAcetam  IVPB 1000 milliGRAM(s) IV Intermittent <User Schedule>  lurasidone 40 milliGRAM(s) Oral at bedtime  metoprolol tartrate Injectable 5 milliGRAM(s) IV Push every 6 hours  pantoprazole    Tablet 40 milliGRAM(s) Oral before breakfast  piperacillin/tazobactam IVPB.. 3.375 Gram(s) IV Intermittent every 8 hours  polyethylene glycol 3350 17 Gram(s) Oral daily  remdesivir  IVPB   IV Intermittent   remdesivir  IVPB 100 milliGRAM(s) IV Intermittent every 24 hours  senna 2 Tablet(s) Oral at bedtime    MEDICATIONS  (PRN):  acetaminophen     Tablet .. 650 milliGRAM(s) Oral every 6 hours PRN Temp greater or equal to 38C (100.4F), Mild Pain (1 - 3)  aluminum hydroxide/magnesium hydroxide/simethicone Suspension 30 milliLiter(s) Oral every 4 hours PRN Dyspepsia  heparin   Injectable 7500 Unit(s) IV Push every 6 hours PRN For aPTT less than 40  heparin   Injectable 3500 Unit(s) IV Push every 6 hours PRN For aPTT between 40 - 57  melatonin 3 milliGRAM(s) Oral at bedtime PRN Insomnia  ondansetron Injectable 4 milliGRAM(s) IV Push every 8 hours PRN Nausea and/or Vomiting      Care Discussed with Consultants/Other Providers [x] YES  [ ] NO    Vital Signs Last 24 Hrs  T(C): 36.8 (21 Feb 2025 10:49), Max: 37.1 (21 Feb 2025 04:30)  T(F): 98.3 (21 Feb 2025 10:49), Max: 98.7 (21 Feb 2025 04:30)  HR: 100 (21 Feb 2025 16:39) (60 - 134)  BP: 136/85 (21 Feb 2025 16:39) (136/85 - 155/88)  BP(mean): --  RR: 22 (21 Feb 2025 16:39) (22 - 38)  SpO2: 93% (21 Feb 2025 16:39) (93% - 100%)    Parameters below as of 21 Feb 2025 16:39  Patient On (Oxygen Delivery Method): nasal cannula, high flow      I&O's Summary    20 Feb 2025 07:01  -  21 Feb 2025 07:00  --------------------------------------------------------  IN: 0 mL / OUT: 1100 mL / NET: -1100 mL    21 Feb 2025 07:01  -  21 Feb 2025 17:44  --------------------------------------------------------  IN: 240 mL / OUT: 900 mL / NET: -660 mL      PHYSICAL EXAM:   GENERAL: NAD, comfortable, on hi-flow <--> bipap  HEENT: NCAT, EOMI  NECK: supple without JVD  CHEST/LUNG: mild decrease breath sounds bilaterally; No wheeze   CARDIOVASCULAR: regular rate and rhythm, normal S1 and S2, no murmur/rub/gallop  ABDOMEN: positive bowel sounds, non-tender, non-distended, no organomegaly   MUSCULOSKELETAL:  moving all four extremities   EXTREMITIES:  no lower extremity edema  NEUROLOGY: awake, alert, oriented at least to self, knows he is in the hospital.

## 2025-02-21 NOTE — PROGRESS NOTE ADULT - PROBLEM SELECTOR PLAN 1
-Recent admission for acute respiratory failure in the setting of grand mal seizures, influenza, PNA. S/p intubation in MICU, was extubated to AVAPS  -Now COVID +  -CXR with low lung volumes, mild congestion. Possible underlying infiltrate  -CT chest with b/l GGO, new since 1/30/25. Likely COVID PNA +/- superimposed bacterial PNA.   -Continue bronchodilators/mucolytics   -Continue ABX  -Continue steroids  -Increased WOB requiring AVAPS  ePAP 5 RR 14 FIo2 50% Max Pressure 35 Min Pressure 15 qHS and PRN daytime for increased WOB.   -Started on HFNC 2/18  -S/p RRT 2/20 for hypoxia - HFNC reportedly had been dislodged - sats improved with AVAPS. Placed on HFNC 50L/70% this AM, sats maintaining mid to high 90s. Suggest continue to wean O2 as tolerated, keep sats >90%  -Continue AVAPS qHS and PRN daytime for increased WOB  -ABG in AM.

## 2025-02-21 NOTE — PROGRESS NOTE ADULT - ASSESSMENT
Mr. Gr is a 69 year old male with PMHx of seizure disorder, sleep apnea, HTN, chronic idiopathic constipation, stage III CKD, severe obesity, secondary hyperparathyroidism, anemia, thrombocytopenia, hyperlipidemia, testicular cancer under treatment with Dr. Ovalles who is admitted for acute hypoxic respiratory failure secondary to COVID vs superimposed pneumonia with new onset thrombocytopenia. He was recently discharged 02/06/2025 after a tenous stay including intubation in the ICU for respiratory failure in setting of seizure. He had recovered and was discharged to rehab.      Former smoker, quit 14y prior, denied ETOH, drug use. Lives at home. Does not have children. Denies family history of blood disorders or malignancy.  Denies previous workplace related exposures.  Denies previous history of blood transfusions.  Denied history of thromboses.  Denied history of  requiring anticoagulation.     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  Margins were negative.  pT3Nx. CT C/A/P with few left para-aortic and left iliac chain lymph nodes largest measuring 8.1 cm left para-aortic node, bilateral subcentimeter noncalcified pulmonary nodules measuring up to 7 mm. AFP, LDH, hCG were all elevated. 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 Started on adjuvant therapy with Cisplatin+Etoposide, so far completed 4 cycles of treatment with cisplatin dose reduced 50% and Etoposide 50% due to thrombocytopenia.      02/19/2025: on HFNC, noted tachycardia and fever, Klebsiella in urine as well, thrombocytopenia stable/improving, no signs of active bleeding     02/20/2025: developed A.fib with RVR and was placed on heparin drip and pending cardiology eval      #Acute hypoxic respiratory failure  # COVID  - CT noted with bilateral GGO  - ID following  - Pulmonary following  - Antibiotics per primary team and ID     # Thrombocytopenia   - Did occur during most recent admission and improved to normal prior to discharge   - Without signs of bleeding  - Could be multifactorial, possibly due to infection   - Continue to trend  - Transfuse to maintain Plt > 10k unless actively bleeding then maintain > 50k     # Brain lesion  - pt with hx of seizures  - MRI brain now with 5.4 mm enhancing lesion in the LEFT middle cerebellar peduncle with associated edema suspicious for metastases  - MRI Lumbar negative for acute disease  - Small lesion without mass effect or edema, recommended to correlate per neurology if foci and locus are concordant with seizure activity  - Neurology recs noted, new lesion not likely to cause seizure  - It is rare, but nonetheless not impossible, for testicular cancer to be metastatic to the brain  - He will need another PET-CT, can be completed outpatient once stable if concern can proceed with biopsy at that time   - Previous endocrinology eval for thyroid nodule noted  - AFP/[facility] normal,  previously      # 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 Franki Ovalles         Thank you for allowing me to participate in the care of Mr. Gr please do not hesitate to call or text me if you have further questions or concerns.     Brayan Mart MD  [facility] [place]   Division of Hematology/Oncology  [address], [address]  [place] [address]  P: [phone]  F: [phone]    Attestation:    ----Pt evaluated including face-to-face interaction in addition to chart review, reviewing treatment plan, and managing the patient’s chronic diagnoses as listed in the assessment----        Mr. Gr is a 69 year old male with PMHx of seizure disorder, sleep apnea, HTN, chronic idiopathic constipation, stage III CKD, severe obesity, secondary hyperparathyroidism, anemia, thrombocytopenia, hyperlipidemia, testicular cancer under treatment with Dr. Ovalles who is admitted for acute hypoxic respiratory failure secondary to COVID vs superimposed pneumonia with new onset thrombocytopenia. He was recently discharged 02/06/2025 after a tenous stay including intubation in the ICU for respiratory failure in setting of seizure. He had recovered and was discharged to rehab.      Former smoker, quit 14y prior, denied ETOH, drug use. Lives at home. Does not have children. Denies family history of blood disorders or malignancy.  Denies previous workplace related exposures.  Denies previous history of blood transfusions.  Denied history of thromboses.  Denied history of  requiring anticoagulation.     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  Margins were negative.  pT3Nx. CT C/A/P with few left para-aortic and left iliac chain lymph nodes largest measuring 8.1 cm left para-aortic node, bilateral subcentimeter noncalcified pulmonary nodules measuring up to 7 mm. AFP, LDH, hCG were all elevated. 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 Started on adjuvant therapy with Cisplatin+Etoposide, so far completed 4 cycles of treatment with cisplatin dose reduced 50% and Etoposide 50% due to thrombocytopenia.      02/19/2025: on HFNC, noted tachycardia and fever, Klebsiella in urine as well, thrombocytopenia stable/improving, no signs of active bleeding     02/20/2025: developed A.fib with RVR and was placed on heparin drip and pending cardiology eval    02/21/2025: awake, on AVAPS overnight, noted RRT for hypoxia as pt removed HFNC at some point in time, good response thereafter       #Acute hypoxic respiratory failure  # COVID  - CT noted with bilateral GGO  - ID following  - Pulmonary following  - Antibiotics per primary team and ID     # Thrombocytopenia   - Did occur during most recent admission and improved to normal prior to discharge   - Without signs of bleeding  - Could be multifactorial, possibly due to infection   - Continue to trend  - Transfuse to maintain Plt > 10k unless actively bleeding then maintain > 50k     # Brain lesion  - pt with hx of seizures  - MRI brain now with 5.4 mm enhancing lesion in the LEFT middle cerebellar peduncle with associated edema suspicious for metastases  - MRI Lumbar negative for acute disease  - Small lesion without mass effect or edema, recommended to correlate per neurology if foci and locus are concordant with seizure activity  - Neurology recs noted, new lesion not likely to cause seizure  - It is rare, but nonetheless not impossible, for testicular cancer to be metastatic to the brain  - He will need another PET-CT, can be completed outpatient once stable if concern can proceed with biopsy at that time   - Previous endocrinology eval for thyroid nodule noted  - AFP/[facility] normal,  previously      # 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 Franki Ovalles         Thank you for allowing me to participate in the care of Mr. Gr please do not hesitate to call or text me if you have further questions or concerns.     Brayan Mart MD  [facility]   Division of Hematology/Oncology  [address] [place] [address], [address]  [place] [address]  P: [phone]  F: [phone]    Attestation:    ----Pt evaluated including face-to-face interaction in addition to chart review, reviewing treatment plan, and managing the patient’s chronic diagnoses as listed in the assessment----

## 2025-02-21 NOTE — PROGRESS NOTE ADULT - SUBJECTIVE AND OBJECTIVE BOX
[facility] HEMATOLOGY/ONCOLOGY INPATIENT PROGRESS NOTE     Interval Hx:   02-21-25: Mr. Gr was seen at bedside today.    Meds:   MEDICATIONS  (STANDING):  albuterol/ipratropium for Nebulization 3 milliLiter(s) Nebulizer every 6 hours  amLODIPine   Tablet 5 milliGRAM(s) Oral daily  atorvastatin 20 milliGRAM(s) Oral at bedtime  cholecalciferol 1000 Unit(s) Oral daily  dexAMETHasone  Injectable 6 milliGRAM(s) IV Push daily  escitalopram 15 milliGRAM(s) Oral with breakfast  gabapentin Solution 150 milliGRAM(s) Oral every 12 hours  heparin  Infusion.  Unit(s)/Hr (17 mL/Hr) IV Continuous <Continuous>  hydrALAZINE 25 milliGRAM(s) Oral three times a day  influenza  Vaccine (HIGH DOSE) 0.5 milliLiter(s) IntraMuscular once  lacosamide IVPB 200 milliGRAM(s) IV Intermittent every 12 hours  levETIRAcetam  IVPB 1000 milliGRAM(s) IV Intermittent <User Schedule>  lurasidone 40 milliGRAM(s) Oral at bedtime  metoprolol tartrate Injectable 5 milliGRAM(s) IV Push every 6 hours  pantoprazole    Tablet 40 milliGRAM(s) Oral before breakfast  piperacillin/tazobactam IVPB.. 3.375 Gram(s) IV Intermittent every 8 hours  polyethylene glycol 3350 17 Gram(s) Oral daily  remdesivir  IVPB   IV Intermittent   remdesivir  IVPB 100 milliGRAM(s) IV Intermittent every 24 hours  senna 2 Tablet(s) Oral at bedtime    MEDICATIONS  (PRN):  acetaminophen     Tablet .. 650 milliGRAM(s) Oral every 6 hours PRN Temp greater or equal to 38C (100.4F), Mild Pain (1 - 3)  aluminum hydroxide/magnesium hydroxide/simethicone Suspension 30 milliLiter(s) Oral every 4 hours PRN Dyspepsia  heparin   Injectable 7500 Unit(s) IV Push every 6 hours PRN For aPTT less than 40  heparin   Injectable 3500 Unit(s) IV Push every 6 hours PRN For aPTT between 40 - 57  melatonin 3 milliGRAM(s) Oral at bedtime PRN Insomnia  ondansetron Injectable 4 milliGRAM(s) IV Push every 8 hours PRN Nausea and/or Vomiting    Vital Signs Last 24 Hrs  T(C): 36.7 (20 Feb 2025 21:36), Max: 36.9 (20 Feb 2025 04:37)  T(F): 98 (20 Feb 2025 21:36), Max: 98.4 (20 Feb 2025 04:37)  HR: 81 (21 Feb 2025 00:35) (54 - 134)  BP: 146/90 (21 Feb 2025 00:35) (146/90 - 185/83)  BP(mean): --  RR: 38 (20 Feb 2025 22:54) (20 - 38)  SpO2: 100% (20 Feb 2025 22:54) (91% - 100%)    Parameters below as of 20 Feb 2025 22:54  Patient On (Oxygen Delivery Method): BiPAP/CPAP    Physical Exam:  Gen: NAD  HEENT: EOMI, MMM  Chest: equal chest rise  Cardiac: regular   Abd: non distended   Neuro: AAOx3     Labs:                        8.7    5.88  )-----------( 93       ( 20 Feb 2025 06:17 )             27.6     CBC Full  -  ( 20 Feb 2025 06:17 )  WBC Count : 5.88 K/uL  RBC Count : 2.79 M/uL  Hemoglobin : 8.7 g/dL  Hematocrit : 27.6 %  Platelet Count - Automated : 93 K/uL  Mean Cell Volume : 98.9 fl  Mean Cell Hemoglobin : 31.2 pg  Mean Cell Hemoglobin Concentration : 31.5 g/dL    02-21    x   |  x   |  x   ----------------------------<  x   x    |  x   |  0.68    Ca    9.2      20 Feb 2025 06:17  Phos  2.6     02-19  Mg     2.2     02-20    TPro  7.0  /  Alb  3.9  /  TBili  0.6  /  DBili  0.1  /  AST  16  /  ALT  13  /  AlkPhos  59  02-21    PT/INR - ( 21 Feb 2025 01:02 )   PT: 11.8 sec;   INR: 1.04 ratio         PTT - ( 20 Feb 2025 22:59 )  PTT:74.3 sec  Bilirubin Total: 0.6 mg/dL (02-21-25 @ 01:02)  Bilirubin Total: 0.5 mg/dL (02-20-25 @ 06:17)   [facility] HEMATOLOGY/ONCOLOGY INPATIENT PROGRESS NOTE     Interval Hx:   02-21-25: Mr. Gr was seen at bedside today, awake, on AVAPS overnight, noted RRT for hypoxia as pt removed HFNC at some point in time, good response thereafter     Meds:   MEDICATIONS  (STANDING):  albuterol/ipratropium for Nebulization 3 milliLiter(s) Nebulizer every 6 hours  amLODIPine   Tablet 5 milliGRAM(s) Oral daily  atorvastatin 20 milliGRAM(s) Oral at bedtime  cholecalciferol 1000 Unit(s) Oral daily  dexAMETHasone  Injectable 6 milliGRAM(s) IV Push daily  escitalopram 15 milliGRAM(s) Oral with breakfast  gabapentin Solution 150 milliGRAM(s) Oral every 12 hours  heparin  Infusion.  Unit(s)/Hr (17 mL/Hr) IV Continuous <Continuous>  hydrALAZINE 25 milliGRAM(s) Oral three times a day  influenza  Vaccine (HIGH DOSE) 0.5 milliLiter(s) IntraMuscular once  lacosamide IVPB 200 milliGRAM(s) IV Intermittent every 12 hours  levETIRAcetam  IVPB 1000 milliGRAM(s) IV Intermittent <User Schedule>  lurasidone 40 milliGRAM(s) Oral at bedtime  metoprolol tartrate Injectable 5 milliGRAM(s) IV Push every 6 hours  pantoprazole    Tablet 40 milliGRAM(s) Oral before breakfast  piperacillin/tazobactam IVPB.. 3.375 Gram(s) IV Intermittent every 8 hours  polyethylene glycol 3350 17 Gram(s) Oral daily  remdesivir  IVPB   IV Intermittent   remdesivir  IVPB 100 milliGRAM(s) IV Intermittent every 24 hours  senna 2 Tablet(s) Oral at bedtime    MEDICATIONS  (PRN):  acetaminophen     Tablet .. 650 milliGRAM(s) Oral every 6 hours PRN Temp greater or equal to 38C (100.4F), Mild Pain (1 - 3)  aluminum hydroxide/magnesium hydroxide/simethicone Suspension 30 milliLiter(s) Oral every 4 hours PRN Dyspepsia  heparin   Injectable 7500 Unit(s) IV Push every 6 hours PRN For aPTT less than 40  heparin   Injectable 3500 Unit(s) IV Push every 6 hours PRN For aPTT between 40 - 57  melatonin 3 milliGRAM(s) Oral at bedtime PRN Insomnia  ondansetron Injectable 4 milliGRAM(s) IV Push every 8 hours PRN Nausea and/or Vomiting    Vital Signs Last 24 Hrs  T(C): 36.7 (20 Feb 2025 21:36), Max: 36.9 (20 Feb 2025 04:37)  T(F): 98 (20 Feb 2025 21:36), Max: 98.4 (20 Feb 2025 04:37)  HR: 81 (21 Feb 2025 00:35) (54 - 134)  BP: 146/90 (21 Feb 2025 00:35) (146/90 - 185/83)  BP(mean): --  RR: 38 (20 Feb 2025 22:54) (20 - 38)  SpO2: 100% (20 Feb 2025 22:54) (91% - 100%)    Parameters below as of 20 Feb 2025 22:54  Patient On (Oxygen Delivery Method): BiPAP/CPAP    Physical Exam:  Gen: NAD  HEENT: EOMI, MMM  Chest: equal chest rise  Cardiac: regular   Abd: non distended   Neuro: AAOx3     Labs:                        8.7    5.88  )-----------( 93       ( 20 Feb 2025 06:17 )             27.6     CBC Full  -  ( 20 Feb 2025 06:17 )  WBC Count : 5.88 K/uL  RBC Count : 2.79 M/uL  Hemoglobin : 8.7 g/dL  Hematocrit : 27.6 %  Platelet Count - Automated : 93 K/uL  Mean Cell Volume : 98.9 fl  Mean Cell Hemoglobin : 31.2 pg  Mean Cell Hemoglobin Concentration : 31.5 g/dL    02-21    x   |  x   |  x   ----------------------------<  x   x    |  x   |  0.68    Ca    9.2      20 Feb 2025 06:17  Phos  2.6     02-19  Mg     2.2     02-20    TPro  7.0  /  Alb  3.9  /  TBili  0.6  /  DBili  0.1  /  AST  16  /  ALT  13  /  AlkPhos  59  02-21    PT/INR - ( 21 Feb 2025 01:02 )   PT: 11.8 sec;   INR: 1.04 ratio         PTT - ( 20 Feb 2025 22:59 )  PTT:74.3 sec  Bilirubin Total: 0.6 mg/dL (02-21-25 @ 01:02)  Bilirubin Total: 0.5 mg/dL (02-20-25 @ 06:17)

## 2025-02-21 NOTE — PROGRESS NOTE ADULT - PROBLEM SELECTOR PLAN 7
Paperwork hasn't been received as of this morning.   Continue with home atorvastatin 20 mg bedtime for HLD, vitamin D supplementation, pantoprazole 40 mg daily, senna 2 tab bedtime, and Miralax 17 gram daily.     General  - DVT prophylaxis: heparin 5000 units q8h   - Diet: regular   - Medication reconciliation: complete   - Code: Full   - Medications: Tylenol 650 mg q6h as needed for pain, Maalox 30 mL q4h as needed for acid reflux, melatonin 3 mg bedtime as needed for insomnia, Zofran 4 mg IV q8h as needed for nausea/vomiting     Disposition  - Needed before discharge: off BIPAP for 24 hours   - Anticipated discharge date: acute   - Discharge to: has apartment in [place] with aide but was recently at SNF for rehab   - Appointments after discharge: PCP    2/16/25 --> plan was discussed with patient's brother Fernando (over the phone, [phone]) in detail. He agrees with plan. All questions answered. He asked that I update Ester (sister, [phone]); she was called and updated as well, also agrees with the plan, also answered all questions.

## 2025-02-21 NOTE — CONSULT NOTE ADULT - ATTENDING COMMENTS
Mr. Gr is a 69 year old male with right facial droop, sleep apnea, HTN, secondary hyperparathyroidism, anemia, and thrombocytopenia, admitted for acute hypoxic respiratory failure secondary to pneumonia and thrombocytopenia. MICU consulted for hypotension. In the ED he was hypotensive there was concern CRS and he was started on the CRS treatment protocol and midodrine. On exam he is conversant and with alert x2. His blood pressure significantly increased and he is no longer hypotensive. Discussed with the ED team. Thank you for this consult. Mr. Gr is a 69 year old male with right facial droop, sleep apnea, HTN, secondary hyperparathyroidism, anemia, and thrombocytopenia, admitted for acute hypoxic respiratory failure secondary to pneumonia and thrombocytopenia. MICU consulted during an RRT for hypoxemic respiratory failure. Patient to be breathing comfortably on BiPAP with good respiratory effort and was AAOx2. Repeat gas improving and patient remains hemodynamically stable. Please obtain a CXR and repeat ABG the morning of 2/22/24. Thank you for this consult.

## 2025-02-21 NOTE — SWALLOW FEES ASSESSMENT ADULT - CONSISTENCIES ADMINISTERED
mildly thick moderately thick/pureed Solid textures NOT administered i/s/o high O2 requirements - conservative diet indicated

## 2025-02-21 NOTE — PROGRESS NOTE ADULT - ASSESSMENT
Patient is a 69 year old male with PMH of HTN, HLD, VAZQUEZ (on CPAP at bedtime, NC 2 liters during the day), CKD3, epilepsy, schizophrenia, developmental delay, metastatic testicular cancer (s/p orchiectomy, actively on chemotherapy, last dose of etoposide/cisplatin on 6/2024) presenting with worsening shortness of breath. Patient was recently hospitalized at [facility] from January 27th 2025 to February 6th 2025 for seizure and influenza virus infection, which required intubation. He was sent to rehab (originally from home) from hospital and now returns due to sudden onset shortness of breath and worsening oxygenation. Of note, he tested positive for COVID-19 at facility on 2/13/25 and was not put on any COVID-19 treatment at the time, only guaifenesin and scopolamine patch. Patient was placed on non-rebreather and sent to the hospital on 2/16/25.     Acute on chronic hypoxic respiratory failure  COVID-19 pneumonia, possible superimposed bacterial pneumonia   Fever likely due to above, now resolved   SHAGUFTA on CKD  - noted initial discussion with patient/family and decided to hold off on remdesivir given LFTs and SHAGUFTA  - CXR with likely congestive changes, possible superimposed focal infiltrate in RUL, no effusion  - CT chest with extensive b/l ggo majority new since 1/30/25-c/w COVID pneumonia; tracheomalacia   - PCT likely not helpful given patient with renal dysfunction   - Legionella and Strep pneumoniae urine ags negative   - MRSA PCR screen negative, s/p vancomycin   - Ucx noted with Klebsiella, pt with no gu symptoms but covered by zosyn   - 2/16 Bcx negative   - 2/17 Scx with normal resp lucia   - 2/18 had worsening resp status on NC requiring AVAPS, started on remdesivir    -- d/w patients sister Ester over phone last night (2/18) regarding COVID treatment, she agreed to start remdesivir, questions/concerns addressed to the best of my ability   - 2/19 overnight febrile to 100.8F, currently on HFNC, comfortable, WBC remains wnl, UA negative   - exam with no new focal findings, suspect fevers d/t COVID pneumonia   - 2/21 afebrile >24h 24h, remains on HFNC/AVAPS, WBC wnl    Recommendations:   Follow repeat 2/19 Bcx - NGTD x2   Continue remdesivir x5d - end 2/22  Continue zosyn 3.375g IV Q8h (renally dosed) to complete 7d course 2/22  Pulmonary following, on dexamethasone   Supplemental O2 as needed, wean as able   Monitor temps/WBC, Cr   Supportive care  Aspiration precautions  Continue rest of care per primary team     Over the weekend Dr. Shiraz Liu will be covering for our group. If you have any questions, concerns or new micro data please reach out to them using TEAMS *PREFERRED* or by calling our service at [phone].     Greyson Mart M.D.  [facility]  Available on [employer] TEAMS - *PREFERRED*  [phone]  After 5pm on weekdays and all day on weekends - please call [phone]     Thank you for consulting us and involving us in the management of this patients case. In addition to reviewing history, imaging, documents, labs, microbiology, took into account antibiotic stewardship, local antibiogram and infection control strategies and potential transmission issues.      Yes

## 2025-02-21 NOTE — SWALLOW FEES ASSESSMENT ADULT - ASPIRATION DURING SWALLOW - COUGH
+; significant cough response s/p aspiration without clearance of bolus, material remained in the trachea

## 2025-02-21 NOTE — PROGRESS NOTE ADULT - SUBJECTIVE AND OBJECTIVE BOX
[facility]  JACINTO Horton Y. Patel, S. Shah, G. Casimir  [phone]  ([phone] - weekdays after 5pm and weekends)    Name: OSCAR MILAN  Age/Gender: 69y Male  MRN: [id number]    Interval History:  Patient seen and examined this morning.   Resting on AVAPS, denies fever or any pain.   Notes reviewed, s/p RRT after taking off his HFNC.  Afebrile   Allergies: seasonal allergies (Unknown)  penicillin (Hives)      Objective:  Vitals:   T(F): 98.7 (02-21-25 @ 04:30), Max: 98.7 (02-21-25 @ 04:30)  HR: 82 (02-21-25 @ 06:25) (54 - 134)  BP: 148/91 (02-21-25 @ 04:30) (146/90 - 171/100)  RR: 24 (02-21-25 @ 04:30) (20 - 38)  SpO2: 100% (02-21-25 @ 04:30) (91% - 100%)  Physical Examination:  General: no acute distress, AVAPS  HEENT: normocephalic, atraumatic, anicteric  Respiratory: decreased breath sounds b/l   Cardiovascular: S1 and S2 present, normal rate   Gastrointestinal: soft, nontender, nondistended  Extremities: no edema, no cyanosis  Skin: no visible rash    Laboratory Studies:  CBC:                       10.6   8.68  )-----------( 114      ( 21 Feb 2025 06:30 )             34.3     WBC Trend:  8.68 02-21-25 @ 06:30  5.88 02-20-25 @ 06:17  5.83 02-19-25 @ 17:51  6.09 02-19-25 @ 08:44  5.67 02-19-25 @ 06:28  6.65 02-18-25 @ 05:02  4.97 02-17-25 @ 05:06  6.83 02-16-25 @ 09:39    CMP: 02-21    x   |  x   |  x   ----------------------------<  x   x    |  x   |  0.68    Ca    9.2      20 Feb 2025 06:17  Mg     2.2     02-20    TPro  7.0  /  Alb  3.9  /  TBili  0.6  /  DBili  0.1  /  AST  16  /  ALT  13  /  AlkPhos  59  02-21    Creatinine: 0.68 mg/dL (02-21-25 @ 01:02)  Creatinine: 2.31 mg/dL (02-20-25 @ 06:17)  Creatinine: 2.64 mg/dL (02-19-25 @ 08:57)  Creatinine: 2.09 mg/dL (02-19-25 @ 06:28)  Creatinine: 1.95 mg/dL (02-19-25 @ 06:27)  Creatinine: 3.11 mg/dL (02-18-25 @ 05:02)  Creatinine: 3.27 mg/dL (02-17-25 @ 05:06)  Creatinine: 3.27 mg/dL (02-16-25 @ 09:39)    LIVER FUNCTIONS - ( 21 Feb 2025 01:02 )  Alb: 3.9 g/dL / Pro: 7.0 g/dL / ALK PHOS: 59 U/L / ALT: 13 U/L / AST: 16 U/L / GGT: x           Microbiology: reviewed   Culture - Blood (collected 02-19-25 @ 00:18)  Source: .Blood Blood-Peripheral  Preliminary Report (02-21-25 @ 03:02):    No growth at 48 Hours    Culture - Blood (collected 02-19-25 @ 00:18)  Source: .Blood Blood-Peripheral  Preliminary Report (02-21-25 @ 03:02):    No growth at 48 Hours    Culture - Sputum (collected 02-17-25 @ 22:23)  Source: .Sputum Sputum  Gram Stain (02-18-25 @ 06:21):    Few polymorphonuclear leukocytes per low power field    No Squamous epithelial cells per low power field    Few Gram Variable Rods seen per oil power field    Rare Gram positive cocci in pairs seen per oil power field  Final Report (02-19-25 @ 16:20):    Commensal lucia consistent with body site    Culture - Urine (collected 02-16-25 @ 11:47)  Source: Clean Catch Clean Catch (Midstream)  Final Report (02-18-25 @ 11:44):    10,000 - 49,000 CFU/mL Klebsiella pneumoniae  Organism: Klebsiella pneumoniae (02-18-25 @ 11:44)  Organism: Klebsiella pneumoniae (02-18-25 @ 11:44)      Method Type: MARIELENA      -  Amoxicillin/Clavulanic Acid: S <=8/4      -  Ampicillin: R >16 These ampicillin results predict results for amoxicillin      -  Ampicillin/Sulbactam: S <=4/2      -  Aztreonam: S <=4      -  Cefazolin: S <=2 For uncomplicated UTI with K. pneumoniae, E. coli, or P. mirablis: MARIELENA <=16 is sensitive and MARIELENA >=32 is resistant. This also predicts results for oral agents cefaclor, cefdinir, cefpodoxime, cefprozil, cefuroxime axetil, cephalexin and locarbef for uncomplicated UTI. Note that some isolates may be susceptible to these agents while testing resistant to cefazolin.      -  Cefepime: S <=2      -  Cefoxitin: S <=8      -  Ceftriaxone: S <=1      -  Cefuroxime: S <=4      -  Ciprofloxacin: S <=0.25      -  Ertapenem: S <=0.5      -  Gentamicin: S <=2      -  Imipenem: S <=1      -  Levofloxacin: S <=0.5      -  Meropenem: S <=1      -  Nitrofurantoin: I 64 Should not be used to treat pyelonephritis      -  Piperacillin/Tazobactam: S <=8      -  Tobramycin: S <=2      -  Trimethoprim/Sulfamethoxazole: S <=0.5/9.5    Culture - Blood (collected 02-16-25 @ 09:35)  Source: .Blood BLOOD  Preliminary Report (02-20-25 @ 15:00):    No growth at 4 days    Culture - Blood (collected 02-16-25 @ 09:25)  Source: .Blood BLOOD  Preliminary Report (02-20-25 @ 15:00):    No growth at 4 days    02-16-25 @ 09:59 SARS-CoV-2 Detected/Influenza A NotDetec/Influenza B NotDetec/RSV NotDetec    Radiology: reviewed     Medications:  acetaminophen     Tablet .. 650 milliGRAM(s) Oral every 6 hours PRN  albuterol/ipratropium for Nebulization 3 milliLiter(s) Nebulizer every 6 hours  aluminum hydroxide/magnesium hydroxide/simethicone Suspension 30 milliLiter(s) Oral every 4 hours PRN  amLODIPine   Tablet 5 milliGRAM(s) Oral daily  atorvastatin 20 milliGRAM(s) Oral at bedtime  cholecalciferol 1000 Unit(s) Oral daily  dexAMETHasone  Injectable 6 milliGRAM(s) IV Push daily  escitalopram 15 milliGRAM(s) Oral with breakfast  gabapentin Solution 150 milliGRAM(s) Oral every 12 hours  heparin   Injectable 7500 Unit(s) IV Push every 6 hours PRN  heparin   Injectable 3500 Unit(s) IV Push every 6 hours PRN  heparin  Infusion.  Unit(s)/Hr IV Continuous <Continuous>  hydrALAZINE 25 milliGRAM(s) Oral three times a day  influenza  Vaccine (HIGH DOSE) 0.5 milliLiter(s) IntraMuscular once  lacosamide IVPB 200 milliGRAM(s) IV Intermittent every 12 hours  levETIRAcetam  IVPB 1000 milliGRAM(s) IV Intermittent <User Schedule>  lurasidone 40 milliGRAM(s) Oral at bedtime  melatonin 3 milliGRAM(s) Oral at bedtime PRN  metoprolol tartrate Injectable 5 milliGRAM(s) IV Push every 6 hours  ondansetron Injectable 4 milliGRAM(s) IV Push every 8 hours PRN  pantoprazole    Tablet 40 milliGRAM(s) Oral before breakfast  piperacillin/tazobactam IVPB.. 3.375 Gram(s) IV Intermittent every 8 hours  polyethylene glycol 3350 17 Gram(s) Oral daily  remdesivir  IVPB   IV Intermittent   remdesivir  IVPB 100 milliGRAM(s) IV Intermittent every 24 hours  senna 2 Tablet(s) Oral at bedtime    Current Antimicrobials:  piperacillin/tazobactam IVPB.. 3.375 Gram(s) IV Intermittent every 8 hours  remdesivir  IVPB   IV Intermittent   remdesivir  IVPB 100 milliGRAM(s) IV Intermittent every 24 hours    Prior/Completed Antimicrobials:  piperacillin/tazobactam IVPB...  remdesivir  IVPB  vancomycin  IVPB.

## 2025-02-21 NOTE — SWALLOW FEES ASSESSMENT ADULT - COMMENTS
Vocal cords mobile, with small glottic gap In the ER, vital signs significant for T-max 101.9F, HR 84-90. WBC 6.8. Hemoglobin 9.1. Platelet 87. AST 70. ALT 48. Cr 3.27. BUN 45. pH 7.30, pCO2 48. UA negative for infection. Patient given vancomycin, Zosyn, albuterol, and  cc bolus. Patient admitted to the general medicine service for further care. Pulmonology consulted for acute respiratory failure. Nephrology following for worsening creatinine. 2/18 pt w/ increased respiratory requirements xfer from 6L NC to AVAPS. Started on remdesivir. 2/19 pt failed RN administered Dysphagia screen; Klebsiella in urine; transition to HFNC; RN reporting pt w/ difficulty swallowing medication and febrile overnight. Cardiology consulted for new onset Afib RvR. 2/20 HFNC lowered to 50L/60% w/ sats in mid 90s.     Imaging:   CT chest with b/l GGO, new since 1/30/25. Likely COVID PNA +/- superimposed bacterial PNA; tracheomalacia.   MRI brain now with 5.4 mm enhancing lesion in the LEFT middle cerebellar peduncle with associated edema suspicious for metastases.    Swallow hx: Pt known to this service w/ initial bedside swallow evaluation completed January 2023. Most recent instrumental evaluation was a FEES completed 1/30/25 revealing an clare-pharyngeal dysphagia. Pt w/ laryngeal penetration before the swallow for mildly thick liquids via straw, laryngeal penetration during the swallow for moderately thick liquids via cup and consecutive straw sips. Residue remains which is cleared with cued cough/re-swallow. Airway protection improved with small, single sips via straw. Pt not a candidate for strategies 2/2 impulsivity and limited carryover. Conservative diet of puree and moderately thick liquids recommended given multiple intubation and c/f aspiration PNA. Bloody mucus in the nasal passage   Trace dark red secretions in the pyriform sinuses   Vocal cords mobile, with small glottic gap, ?effort related

## 2025-02-21 NOTE — SWALLOW FEES ASSESSMENT ADULT - DIAGNOSTIC IMPRESSIONS
*Full note to follow. Pt presents with an oropharyngeal dysphagia. Pt presents with an oropharyngeal dysphagia. Swallow function characterized by premature spillage with mildly thick liquids, with subsequent aspiration during the swallow. Significant cough response noted, however, ineffective in clearing material from trachea. Improved bolus control and airway protection noted with purees and moderately thick liquids. No significant pharyngeal residue across textures.

## 2025-02-21 NOTE — PROGRESS NOTE ADULT - ASSESSMENT
69 year old male with a past medical history of hypertension, hyperlipemia VAZQUEZ (on CPAP at bedtime, NC 2 liters during the day), CKD stage 3, epilepsy, schizophrenia, developmental delay, metastatic testicular cancer (s/p orchiectomy, actively on chemotherapy, last dose of etoposide/cisplatin on 6/2024) presenting with worsening shortness of breath. Patient was recently hospitalized at [facility] from January 27th 2025 to February 6th 2025 for seizure and influenza virus infection, which required intubation. He was sent to rehab (originally from home) from hospital. He returns due to sudden onset shortness of breath and worsening oxygenation. Of note, he tested positive for COVID-19       1- SHAGUFTA on CKDIII  2- covid-19  3- anemia  4- cold to touch RLE       SHAGUFTA suspected in setting of new infection. covid 19   cr is improving   decadron 6 mg iv daily   remdisivir 100 mg iv daily   Right foot is warm   anemia, trend hgb

## 2025-02-21 NOTE — SWALLOW FEES ASSESSMENT ADULT - SLP GENERAL OBSERVATIONS
Pt encountered in bed, initially asleep, on HFNC (50L/70%). Rousable to tactile stimuli, alert but appears tired. O2 saturation mid-90s throughout study.

## 2025-02-21 NOTE — PROGRESS NOTE ADULT - NS ATTEND AMEND GEN_ALL_CORE FT
overnight events noted:  he is on high flow at this time:   on heparin :   cont to titrate down fio2 as tolerated:   cont current rx: overnight events noted:  he is on high flow at this time:   on heparin :   cont to titrate down fio2 as tolerated: however his resp status is tenuous and he may need MICU any time   cont current rx:  do ABG:     fabiano acp

## 2025-02-21 NOTE — PROGRESS NOTE ADULT - SUBJECTIVE AND OBJECTIVE BOX
DATE OF SERVICE: 02-21-25 @ 13:12    Patient is a 69y old  Male who presents with a chief complaint of Acute on chronic hypoxemic respiratory failure (21 Feb 2025 10:49)      INTERVAL HISTORY: s/p RRT yesterday 2/20 for hypoxia after HFNC became dislodged    REVIEW OF SYSTEMS:  CONSTITUTIONAL: No weakness  EYES/ENT: No visual changes;  No throat pain   NECK: No pain or stiffness  RESPIRATORY: No cough, wheezing; No shortness of breath  CARDIOVASCULAR: No chest pain or palpitations  GASTROINTESTINAL: No abdominal  pain. No nausea, vomiting, or hematemesis  GENITOURINARY: No dysuria, frequency or hematuria  NEUROLOGICAL: No stroke like symptoms  SKIN: No rashes      	  MEDICATIONS:  amLODIPine   Tablet 5 milliGRAM(s) Oral daily  hydrALAZINE 25 milliGRAM(s) Oral three times a day  metoprolol tartrate Injectable 5 milliGRAM(s) IV Push every 6 hours        PHYSICAL EXAM:  T(C): 36.8 (02-21-25 @ 10:49), Max: 37.1 (02-21-25 @ 04:30)  HR: 78 (02-21-25 @ 10:49) (54 - 134)  BP: 139/84 (02-21-25 @ 10:49) (139/84 - 170/97)  RR: 22 (02-21-25 @ 10:49) (20 - 38)  SpO2: 95% (02-21-25 @ 10:49) (91% - 100%)  Wt(kg): --  I&O's Summary    20 Feb 2025 07:01  -  21 Feb 2025 07:00  --------------------------------------------------------  IN: 0 mL / OUT: 1100 mL / NET: -1100 mL    21 Feb 2025 07:01  -  21 Feb 2025 13:12  --------------------------------------------------------  IN: 0 mL / OUT: 400 mL / NET: -400 mL          Appearance: In no distress	  HEENT:    PERRL, EOMI	  Cardiovascular:  S1 S2, No JVD  Respiratory: Lungs clear to auscultation	  Gastrointestinal:  Soft, Non-tender, + BS	  Vascularature:  No edema of LE  Psychiatric: Appropriate affect   Neuro: no acute focal deficits                               10.6   8.68  )-----------( 114      ( 21 Feb 2025 06:30 )             34.3     02-21    x   |  x   |  x   ----------------------------<  x   x    |  x   |  0.68    Ca    9.2      20 Feb 2025 06:17  Mg     2.2     02-20    TPro  7.0  /  Alb  3.9  /  TBili  0.6  /  DBili  0.1  /  AST  16  /  ALT  13  /  AlkPhos  59  02-21        Labs personally reviewed      ASSESSMENT/PLAN: 	    69 year old male with a past medical history of hypertension, hyperlipemia VAZQUEZ (on CPAP at bedtime, NC 2 liters during the day), CKD stage 3, epilepsy, schizophrenia, developmental delay, metastatic testicular cancer (s/p orchiectomy, actively on chemotherapy, last dose of etoposide/cisplatin on 6/2024), presenting with acute on chronic hypoxemic respiratory failure, secondary to (a) COVID-19 infection, (b) superimposed pneumonia after recent influenza infection, and/or (c) fluid overload.     Problem/Plan - 1:  ·  Problem: Acute on chronic respiratory failure with hypoxia.   ·  Plan: Likely 2/2 PNA, does not appear to be in decompensated HF  - Agree with holding diuresis   - Appreciate pulmonology.    Problem/Plan - 2:  ·  Problem: SHAGUFTA (acute kidney injury).   ·  Plan: Has a history of CKD stage 3, Cr 2.38 at baseline. Presents with Cr 3.27. Likely pre-renal state  - Appreciate nephrology.    Problem/Plan - 3:  ·  Problem: Chronic heart failure with preserved ejection fraction.   ·  Plan: TTE done here 1/17/25 technically difficult, but LV systolic fxn appears nl without any regional wall motion abnormalities  - Repeat TTE pending  - BNP 5000 <---1100 but appears euvolemic   - Hold off diuretics     Problem/Plan - 4:  ·  Problem: New onset Afib RVR (on tele, confirmed with EKG 2/19)   ·  Plan: Started metoprolol 5mg IVP q6 hours & Hep gtt  - If rate remains uncontrolled, can start Cardizem drip at 5mg/h  - Initiated hep gtt and will transition to Eliquis     Problem/Plan - 5:  ·  Problem: HTN    ·  Plan: Start Hydral 25mg TID & amlodipine 5mg for uncontrolled BP (2/19)  - improved    Problem/Plan - 6:  ·  Problem: Prophylactic measure.   ·  Plan:  DVT prophylaxis: heparin gtt        Iolani Behrbom, AG-NP Omid Javdan,  [facility]  Cardiovascular Medicine  [address] [place] [address], [address]  Available through call or text on [employer]  Office: [phone]

## 2025-02-21 NOTE — CONSULT NOTE ADULT - ASSESSMENT
HPI: 69 year old male with a past medical history of hypertension, hyperlipemia VAZQUEZ (on CPAP at bedtime, NC 2 liters during the day), CKD stage 3, epilepsy, schizophrenia, developmental delay, metastatic testicular cancer (s/p orchiectomy, actively on chemotherapy, last dose of etoposide/cisplatin on 6/2024) presenting with worsening shortness of breath. COVID positive. MICU consulted for increased WOB.    ASSESSMENT:  69 year old male with a past medical history of hypertension, VAZQUEZ (on CPAP at bedtime). MICU consulted for increased WOB.  - On evaluation patient tachypneic on BiPAP  - ABG on BiPAP showing pH 7.34, pCO2 40, HCO3 22  - CXR with Progression of infiltrates    PLAN:  - c/w BiPAP  - repeat ABG at 23:59, if worsening hypercapnia or change in mental status reconsult MICU

## 2025-02-21 NOTE — PROGRESS NOTE ADULT - NSPROGADDITIONALINFOA_GEN_ALL_CORE
acute respiratory failure with hypoxia on hi-flow <--> bipap  speech & swallow eval appreciated. currently NPO. Plan for FEES.   may need gentle IVF if LV function stable. however, tricky with hypoxia. d/w renal.   critically ill.     - Dr. SHIRA Leroy ([facility])  - [phone]

## 2025-02-21 NOTE — RAPID RESPONSE TEAM SUMMARY - NSSITUATIONBACKGROUNDRRT_GEN_ALL_CORE
69 year old male with a past medical history of hypertension, hyperlipemia VAZQUEZ (on CPAP at bedtime, NC 2 liters during the day), CKD stage 3, epilepsy, schizophrenia, developmental delay, metastatic testicular cancer (s/p orchiectomy, actively on chemotherapy, last dose of etoposide/cisplatin on 6/2024), presenting with acute on chronic hypoxemic respiratory failure, secondary to (a) COVID-19 infection, (b) superimposed pneumonia after recent influenza infection, and/or (c) fluid overload.   RRT called for increased work of breathing.

## 2025-02-21 NOTE — PROGRESS NOTE ADULT - ASSESSMENT
70 y/o M with PMH of HTN, HLD, VAZQUEZ on CPAP and home O2 (2LNC daytime), CKD, epilepsy, schizophrenia developmental delay, metastatic testicular CA. Recent admission at [facility] for acute respiratory failure in the setting of seizures, influenza infection, pulmonary edema, PNA requiring intubation in MICU. Was eventually discharged to rehab. Presents now with SOB, found to be COVID + at facility on 2/13. Febrile on admission to ED to 101.9F. Placed on Bipap for increased WOB. Pulmonary called to consult for acute respiratory failure.

## 2025-02-22 LAB
ALBUMIN SERPL ELPH-MCNC: 2.9 G/DL — LOW (ref 3.3–5)
ALP SERPL-CCNC: 148 U/L — HIGH (ref 40–120)
ALT FLD-CCNC: 33 U/L — SIGNIFICANT CHANGE UP (ref 10–45)
ANION GAP SERPL CALC-SCNC: 14 MMOL/L — SIGNIFICANT CHANGE UP (ref 5–17)
APTT BLD: 52.2 SEC — HIGH (ref 24.5–35.6)
APTT BLD: 58.8 SEC — HIGH (ref 24.5–35.6)
APTT BLD: 60.4 SEC — HIGH (ref 24.5–35.6)
AST SERPL-CCNC: 50 U/L — HIGH (ref 10–40)
BASE EXCESS BLDA CALC-SCNC: -3.7 MMOL/L — LOW (ref -2–3)
BASE EXCESS BLDA CALC-SCNC: -4 MMOL/L — LOW (ref -2–3)
BILIRUB SERPL-MCNC: 0.4 MG/DL — SIGNIFICANT CHANGE UP (ref 0.2–1.2)
BUN SERPL-MCNC: 54 MG/DL — HIGH (ref 7–23)
CALCIUM SERPL-MCNC: 9.8 MG/DL — SIGNIFICANT CHANGE UP (ref 8.4–10.5)
CHLORIDE SERPL-SCNC: 114 MMOL/L — HIGH (ref 96–108)
CO2 BLDA-SCNC: 24 MMOL/L — SIGNIFICANT CHANGE UP (ref 19–24)
CO2 BLDA-SCNC: 24 MMOL/L — SIGNIFICANT CHANGE UP (ref 19–24)
CO2 SERPL-SCNC: 21 MMOL/L — LOW (ref 22–31)
CREAT SERPL-MCNC: 2.47 MG/DL — HIGH (ref 0.5–1.3)
EGFR: 28 ML/MIN/1.73M2 — LOW
EGFR: 28 ML/MIN/1.73M2 — LOW
GAS PNL BLDA: SIGNIFICANT CHANGE UP
GAS PNL BLDA: SIGNIFICANT CHANGE UP
GLUCOSE BLDC GLUCOMTR-MCNC: 126 MG/DL — HIGH (ref 70–99)
GLUCOSE SERPL-MCNC: 109 MG/DL — HIGH (ref 70–99)
HCO3 BLDA-SCNC: 23 MMOL/L — SIGNIFICANT CHANGE UP (ref 21–28)
HCO3 BLDA-SCNC: 23 MMOL/L — SIGNIFICANT CHANGE UP (ref 21–28)
HCT VFR BLD CALC: 28.3 % — LOW (ref 39–50)
HGB BLD-MCNC: 8.6 G/DL — LOW (ref 13–17)
HOROWITZ INDEX BLDA+IHG-RTO: 70 — SIGNIFICANT CHANGE UP
HOROWITZ INDEX BLDA+IHG-RTO: 70 — SIGNIFICANT CHANGE UP
INR BLD: 1.05 RATIO — SIGNIFICANT CHANGE UP (ref 0.85–1.16)
MCHC RBC-ENTMCNC: 30.4 G/DL — LOW (ref 32–36)
MCHC RBC-ENTMCNC: 30.6 PG — SIGNIFICANT CHANGE UP (ref 27–34)
MCV RBC AUTO: 100.7 FL — HIGH (ref 80–100)
NRBC BLD AUTO-RTO: 0 /100 WBCS — SIGNIFICANT CHANGE UP (ref 0–0)
PCO2 BLDA: 45 MMHG — SIGNIFICANT CHANGE UP (ref 35–48)
PCO2 BLDA: 46 MMHG — SIGNIFICANT CHANGE UP (ref 35–48)
PH BLDA: 7.3 — LOW (ref 7.35–7.45)
PH BLDA: 7.31 — LOW (ref 7.35–7.45)
PLATELET # BLD AUTO: 106 K/UL — LOW (ref 150–400)
PO2 BLDA: 132 MMHG — HIGH (ref 83–108)
PO2 BLDA: 98 MMHG — SIGNIFICANT CHANGE UP (ref 83–108)
POTASSIUM SERPL-MCNC: 4.1 MMOL/L — SIGNIFICANT CHANGE UP (ref 3.5–5.3)
POTASSIUM SERPL-SCNC: 4.1 MMOL/L — SIGNIFICANT CHANGE UP (ref 3.5–5.3)
PROT SERPL-MCNC: 6.4 G/DL — SIGNIFICANT CHANGE UP (ref 6–8.3)
PROTHROM AB SERPL-ACNC: 12.1 SEC — SIGNIFICANT CHANGE UP (ref 9.9–13.4)
RBC # BLD: 2.81 M/UL — LOW (ref 4.2–5.8)
RBC # FLD: 14.8 % — HIGH (ref 10.3–14.5)
SAO2 % BLDA: 99.3 % — HIGH (ref 94–98)
SAO2 % BLDA: 99.9 % — HIGH (ref 94–98)
SODIUM SERPL-SCNC: 149 MMOL/L — HIGH (ref 135–145)
WBC # BLD: 8.59 K/UL — SIGNIFICANT CHANGE UP (ref 3.8–10.5)
WBC # FLD AUTO: 8.59 K/UL — SIGNIFICANT CHANGE UP (ref 3.8–10.5)

## 2025-02-22 RX ORDER — FUROSEMIDE 10 MG/ML
40 INJECTION INTRAMUSCULAR; INTRAVENOUS ONCE
Refills: 0 | Status: COMPLETED | OUTPATIENT
Start: 2025-02-22 | End: 2025-02-22

## 2025-02-22 RX ORDER — METOCLOPRAMIDE HCL 10 MG
10 TABLET ORAL ONCE
Refills: 0 | Status: COMPLETED | OUTPATIENT
Start: 2025-02-22 | End: 2025-02-22

## 2025-02-22 RX ORDER — LORAZEPAM 4 MG/ML
0.5 VIAL (ML) INJECTION ONCE
Refills: 0 | Status: DISCONTINUED | OUTPATIENT
Start: 2025-02-22 | End: 2025-02-22

## 2025-02-22 RX ORDER — ACETAMINOPHEN 500 MG/5ML
1000 LIQUID (ML) ORAL ONCE
Refills: 0 | Status: COMPLETED | OUTPATIENT
Start: 2025-02-22 | End: 2025-02-22

## 2025-02-22 RX ORDER — IPRATROPIUM BROMIDE AND ALBUTEROL SULFATE .5; 2.5 MG/3ML; MG/3ML
3 SOLUTION RESPIRATORY (INHALATION) ONCE
Refills: 0 | Status: COMPLETED | OUTPATIENT
Start: 2025-02-22 | End: 2025-02-22

## 2025-02-22 RX ADMIN — LACOSAMIDE 140 MILLIGRAM(S): 150 TABLET, FILM COATED ORAL at 05:08

## 2025-02-22 RX ADMIN — LEVETIRACETAM 400 MILLIGRAM(S): 10 INJECTION, SOLUTION INTRAVENOUS at 21:02

## 2025-02-22 RX ADMIN — IPRATROPIUM BROMIDE AND ALBUTEROL SULFATE 3 MILLILITER(S): .5; 2.5 SOLUTION RESPIRATORY (INHALATION) at 23:14

## 2025-02-22 RX ADMIN — Medication 10 MILLIGRAM(S): at 20:18

## 2025-02-22 RX ADMIN — LEVETIRACETAM 400 MILLIGRAM(S): 10 INJECTION, SOLUTION INTRAVENOUS at 06:00

## 2025-02-22 RX ADMIN — ACETYLCYSTEINE 3 MILLILITER(S): 200 INHALANT RESPIRATORY (INHALATION) at 17:18

## 2025-02-22 RX ADMIN — METOPROLOL SUCCINATE 5 MILLIGRAM(S): 50 TABLET, EXTENDED RELEASE ORAL at 23:14

## 2025-02-22 RX ADMIN — LEVETIRACETAM 400 MILLIGRAM(S): 10 INJECTION, SOLUTION INTRAVENOUS at 12:40

## 2025-02-22 RX ADMIN — Medication 10 MILLIGRAM(S): at 17:15

## 2025-02-22 RX ADMIN — HEPARIN SODIUM 1100 UNIT(S)/HR: 1000 INJECTION INTRAVENOUS; SUBCUTANEOUS at 12:33

## 2025-02-22 RX ADMIN — HEPARIN SODIUM 1100 UNIT(S)/HR: 1000 INJECTION INTRAVENOUS; SUBCUTANEOUS at 19:08

## 2025-02-22 RX ADMIN — IPRATROPIUM BROMIDE AND ALBUTEROL SULFATE 3 MILLILITER(S): .5; 2.5 SOLUTION RESPIRATORY (INHALATION) at 17:19

## 2025-02-22 RX ADMIN — Medication 0.5 MILLIGRAM(S): at 20:18

## 2025-02-22 RX ADMIN — Medication 1000 MILLIGRAM(S): at 20:36

## 2025-02-22 RX ADMIN — Medication 400 MILLIGRAM(S): at 20:06

## 2025-02-22 RX ADMIN — METOPROLOL SUCCINATE 5 MILLIGRAM(S): 50 TABLET, EXTENDED RELEASE ORAL at 17:18

## 2025-02-22 RX ADMIN — IPRATROPIUM BROMIDE AND ALBUTEROL SULFATE 3 MILLILITER(S): .5; 2.5 SOLUTION RESPIRATORY (INHALATION) at 18:44

## 2025-02-22 RX ADMIN — REMDESIVIR 200 MILLIGRAM(S): 5 INJECTION INTRAVENOUS at 18:23

## 2025-02-22 RX ADMIN — HEPARIN SODIUM 1100 UNIT(S)/HR: 1000 INJECTION INTRAVENOUS; SUBCUTANEOUS at 19:28

## 2025-02-22 RX ADMIN — HEPARIN SODIUM 1100 UNIT(S)/HR: 1000 INJECTION INTRAVENOUS; SUBCUTANEOUS at 07:22

## 2025-02-22 RX ADMIN — METOPROLOL SUCCINATE 5 MILLIGRAM(S): 50 TABLET, EXTENDED RELEASE ORAL at 05:08

## 2025-02-22 RX ADMIN — ACETYLCYSTEINE 3 MILLILITER(S): 200 INHALANT RESPIRATORY (INHALATION) at 12:34

## 2025-02-22 RX ADMIN — Medication 25 GRAM(S): at 14:09

## 2025-02-22 RX ADMIN — Medication 25 GRAM(S): at 05:08

## 2025-02-22 RX ADMIN — IPRATROPIUM BROMIDE AND ALBUTEROL SULFATE 3 MILLILITER(S): .5; 2.5 SOLUTION RESPIRATORY (INHALATION) at 12:31

## 2025-02-22 RX ADMIN — METOPROLOL SUCCINATE 5 MILLIGRAM(S): 50 TABLET, EXTENDED RELEASE ORAL at 12:31

## 2025-02-22 RX ADMIN — DEXAMETHASONE 6 MILLIGRAM(S): 0.5 TABLET ORAL at 05:08

## 2025-02-22 RX ADMIN — HEPARIN SODIUM 1100 UNIT(S)/HR: 1000 INJECTION INTRAVENOUS; SUBCUTANEOUS at 13:02

## 2025-02-22 RX ADMIN — HEPARIN SODIUM 1100 UNIT(S)/HR: 1000 INJECTION INTRAVENOUS; SUBCUTANEOUS at 06:30

## 2025-02-22 RX ADMIN — ACETYLCYSTEINE 3 MILLILITER(S): 200 INHALANT RESPIRATORY (INHALATION) at 23:14

## 2025-02-22 RX ADMIN — ACETYLCYSTEINE 3 MILLILITER(S): 200 INHALANT RESPIRATORY (INHALATION) at 05:09

## 2025-02-22 RX ADMIN — LACOSAMIDE 140 MILLIGRAM(S): 150 TABLET, FILM COATED ORAL at 17:15

## 2025-02-22 RX ADMIN — Medication 25 GRAM(S): at 21:14

## 2025-02-22 RX ADMIN — FUROSEMIDE 40 MILLIGRAM(S): 10 INJECTION INTRAMUSCULAR; INTRAVENOUS at 17:29

## 2025-02-22 RX ADMIN — Medication 4 MILLIGRAM(S): at 19:35

## 2025-02-22 RX ADMIN — IPRATROPIUM BROMIDE AND ALBUTEROL SULFATE 3 MILLILITER(S): .5; 2.5 SOLUTION RESPIRATORY (INHALATION) at 05:09

## 2025-02-22 NOTE — PROGRESS NOTE ADULT - SUBJECTIVE AND OBJECTIVE BOX
SUBJECTIVE/ OVERNIGHT EVENTS:  events reviewed  RRT for hypoxia  ICU korey appreciated  currently comfortable on bipap though labile respiratory status  Na slightly up  d/w renal, holding IVF for now.   yesterday he was able to eat per renal.  d/w pt's sister Ester, clinical status updated in details.   All questions answered.      --------------------------------------------------------------------------------------------  LABS:                        8.6    8.59  )-----------( 106      ( 22 Feb 2025 06:08 )             28.3     02-22    149[H]  |  114[H]  |  54[H]  ----------------------------<  109[H]  4.1   |  21[L]  |  2.47[H]    Ca    9.8      22 Feb 2025 06:08  Phos  4.1     02-21  Mg     2.3     02-21    TPro  6.4  /  Alb  2.9[L]  /  TBili  0.4  /  DBili  x   /  AST  50[H]  /  ALT  33  /  AlkPhos  148[H]  02-22    PT/INR - ( 22 Feb 2025 06:08 )   PT: 12.1 sec;   INR: 1.05 ratio         PTT - ( 22 Feb 2025 06:08 )  PTT:52.2 sec  CAPILLARY BLOOD GLUCOSE      POCT Blood Glucose.: 121 mg/dL (21 Feb 2025 19:14)        Urinalysis Basic - ( 22 Feb 2025 06:08 )    Color: x / Appearance: x / SG: x / pH: x  Gluc: 109 mg/dL / Ketone: x  / Bili: x / Urobili: x   Blood: x / Protein: x / Nitrite: x   Leuk Esterase: x / RBC: x / WBC x   Sq Epi: x / Non Sq Epi: x / Bacteria: x        RADIOLOGY & ADDITIONAL TESTS:    Imaging Personally Reviewed:  [x] YES  [ ] NO    Consultant(s) Notes Reviewed:  [x] YES  [ ] NO    MEDICATIONS  (STANDING):  acetylcysteine 20%  Inhalation 3 milliLiter(s) Inhalation every 6 hours  albuterol/ipratropium for Nebulization 3 milliLiter(s) Nebulizer every 6 hours  amLODIPine   Tablet 5 milliGRAM(s) Oral daily  atorvastatin 20 milliGRAM(s) Oral at bedtime  cholecalciferol 1000 Unit(s) Oral daily  dexAMETHasone  Injectable 6 milliGRAM(s) IV Push daily  escitalopram 15 milliGRAM(s) Oral with breakfast  gabapentin Solution 150 milliGRAM(s) Oral every 12 hours  heparin  Infusion.  Unit(s)/Hr (17 mL/Hr) IV Continuous <Continuous>  hydrALAZINE 25 milliGRAM(s) Oral three times a day  influenza  Vaccine (HIGH DOSE) 0.5 milliLiter(s) IntraMuscular once  lacosamide IVPB 200 milliGRAM(s) IV Intermittent every 12 hours  levETIRAcetam  IVPB 1000 milliGRAM(s) IV Intermittent <User Schedule>  lurasidone 40 milliGRAM(s) Oral at bedtime  metoprolol tartrate Injectable 5 milliGRAM(s) IV Push every 6 hours  pantoprazole    Tablet 40 milliGRAM(s) Oral before breakfast  piperacillin/tazobactam IVPB.. 3.375 Gram(s) IV Intermittent every 8 hours  polyethylene glycol 3350 17 Gram(s) Oral daily  remdesivir  IVPB   IV Intermittent   remdesivir  IVPB 100 milliGRAM(s) IV Intermittent every 24 hours  senna 2 Tablet(s) Oral at bedtime    MEDICATIONS  (PRN):  acetaminophen     Tablet .. 650 milliGRAM(s) Oral every 6 hours PRN Temp greater or equal to 38C (100.4F), Mild Pain (1 - 3)  aluminum hydroxide/magnesium hydroxide/simethicone Suspension 30 milliLiter(s) Oral every 4 hours PRN Dyspepsia  heparin   Injectable 7500 Unit(s) IV Push every 6 hours PRN For aPTT less than 40  heparin   Injectable 3500 Unit(s) IV Push every 6 hours PRN For aPTT between 40 - 57  melatonin 3 milliGRAM(s) Oral at bedtime PRN Insomnia  ondansetron Injectable 4 milliGRAM(s) IV Push every 8 hours PRN Nausea and/or Vomiting      Care Discussed with Consultants/Other Providers [x] YES  [ ] NO    Vital Signs Last 24 Hrs  T(C): 36.6 (22 Feb 2025 04:31), Max: 36.8 (21 Feb 2025 10:49)  T(F): 97.8 (22 Feb 2025 04:31), Max: 98.3 (21 Feb 2025 10:49)  HR: 75 (22 Feb 2025 06:08) (75 - 100)  BP: 148/86 (22 Feb 2025 06:08) (136/85 - 163/79)  BP(mean): --  RR: 22 (22 Feb 2025 06:08) (22 - 22)  SpO2: 99% (22 Feb 2025 06:08) (93% - 100%)    Parameters below as of 22 Feb 2025 06:08  Patient On (Oxygen Delivery Method): BiPAP/CPAP      I&O's Summary    21 Feb 2025 07:01  -  22 Feb 2025 07:00  --------------------------------------------------------  IN: 350 mL / OUT: 1550 mL / NET: -1200 mL      PHYSICAL EXAM:   GENERAL: NAD, comfortable, on hi-flow <--> bipap  HEENT: NCAT, EOMI  NECK: supple without JVD  CHEST/LUNG: mild decrease breath sounds bilaterally; No wheeze   CARDIOVASCULAR: regular rate and rhythm, normal S1 and S2, no murmur/rub/gallop  ABDOMEN: positive bowel sounds, non-tender, non-distended, no organomegaly   MUSCULOSKELETAL:  moving all four extremities   EXTREMITIES:  no lower extremity edema  NEUROLOGY: awake, alert, oriented at least to self, knows he is in the hospital.

## 2025-02-22 NOTE — PROGRESS NOTE ADULT - PROBLEM SELECTOR PLAN 7
Continue with home atorvastatin 20 mg bedtime for HLD, vitamin D supplementation, pantoprazole 40 mg daily, senna 2 tab bedtime, and Miralax 17 gram daily.     General  - DVT prophylaxis: heparin 5000 units q8h   - Diet: regular   - Medication reconciliation: complete   - Code: Full   - Medications: Tylenol 650 mg q6h as needed for pain, Maalox 30 mL q4h as needed for acid reflux, melatonin 3 mg bedtime as needed for insomnia, Zofran 4 mg IV q8h as needed for nausea/vomiting     Disposition  - Needed before discharge: off BIPAP for 24 hours   - Anticipated discharge date: acute   - Discharge to: has apartment in [place] with aide but was recently at SNF for rehab   - Appointments after discharge: PCP    2/16/25 --> plan was discussed with patient's brother Fernando (over the phone, [phone]) in detail. He agrees with plan. All questions answered. He asked that I update Ester (sister, [phone]); she was called and updated as well, also agrees with the plan, also answered all questions.

## 2025-02-22 NOTE — PROGRESS NOTE ADULT - SUBJECTIVE AND OBJECTIVE BOX
Follow-up Pulm Progress Note    Covering Dr. Hernandez    S/p RRT yesterday for tachypnea  Remains on AVAPS  currently tachypneic - RR 30s   pt endorses some SOB  denies CP          Medications:  MEDICATIONS  (STANDING):  acetylcysteine 20%  Inhalation 3 milliLiter(s) Inhalation every 6 hours  albuterol/ipratropium for Nebulization 3 milliLiter(s) Nebulizer every 6 hours  amLODIPine   Tablet 5 milliGRAM(s) Oral daily  atorvastatin 20 milliGRAM(s) Oral at bedtime  cholecalciferol 1000 Unit(s) Oral daily  dexAMETHasone  Injectable 6 milliGRAM(s) IV Push daily  escitalopram 15 milliGRAM(s) Oral with breakfast  gabapentin Solution 150 milliGRAM(s) Oral every 12 hours  heparin  Infusion.  Unit(s)/Hr (17 mL/Hr) IV Continuous <Continuous>  hydrALAZINE 25 milliGRAM(s) Oral three times a day  influenza  Vaccine (HIGH DOSE) 0.5 milliLiter(s) IntraMuscular once  lacosamide IVPB 200 milliGRAM(s) IV Intermittent every 12 hours  levETIRAcetam  IVPB 1000 milliGRAM(s) IV Intermittent <User Schedule>  lurasidone 40 milliGRAM(s) Oral at bedtime  metoprolol tartrate Injectable 5 milliGRAM(s) IV Push every 6 hours  pantoprazole    Tablet 40 milliGRAM(s) Oral before breakfast  piperacillin/tazobactam IVPB.. 3.375 Gram(s) IV Intermittent every 8 hours  polyethylene glycol 3350 17 Gram(s) Oral daily  remdesivir  IVPB   IV Intermittent   remdesivir  IVPB 100 milliGRAM(s) IV Intermittent every 24 hours  senna 2 Tablet(s) Oral at bedtime    MEDICATIONS  (PRN):  acetaminophen     Tablet .. 650 milliGRAM(s) Oral every 6 hours PRN Temp greater or equal to 38C (100.4F), Mild Pain (1 - 3)  aluminum hydroxide/magnesium hydroxide/simethicone Suspension 30 milliLiter(s) Oral every 4 hours PRN Dyspepsia  heparin   Injectable 7500 Unit(s) IV Push every 6 hours PRN For aPTT less than 40  heparin   Injectable 3500 Unit(s) IV Push every 6 hours PRN For aPTT between 40 - 57  melatonin 3 milliGRAM(s) Oral at bedtime PRN Insomnia  ondansetron Injectable 4 milliGRAM(s) IV Push every 8 hours PRN Nausea and/or Vomiting          Vital Signs Last 24 Hrs  T(C): 36.6 (22 Feb 2025 04:31), Max: 36.8 (21 Feb 2025 10:49)  T(F): 97.8 (22 Feb 2025 04:31), Max: 98.3 (21 Feb 2025 10:49)  HR: 88 (22 Feb 2025 09:27) (75 - 100)  BP: 148/86 (22 Feb 2025 06:08) (136/85 - 163/79)  BP(mean): --  RR: 22 (22 Feb 2025 06:08) (22 - 22)  SpO2: 98% (22 Feb 2025 09:27) (93% - 100%)    Parameters below as of 22 Feb 2025 06:08  Patient On (Oxygen Delivery Method): BiPAP/CPAP        ABG - ( 22 Feb 2025 06:05 )  pH, Arterial: 7.30  pH, Blood: x     /  pCO2: 46    /  pO2: 132   / HCO3: 23    / Base Excess: -4.0  /  SaO2: 99.9              VBG pH 7.26 02-21 @ 19:32    VBG pCO2 53 02-21 @ 19:32    VBG O2 sat 31.5 02-21 @ 19:32    VBG lactate 1.3 02-21 @ 19:32      02-21 @ 07:01  -  02-22 @ 07:00  --------------------------------------------------------  IN: 350 mL / OUT: 1550 mL / NET: -1200 mL          LABS:                        8.6    8.59  )-----------( 106      ( 22 Feb 2025 06:08 )             28.3     02-22    149[H]  |  114[H]  |  54[H]  ----------------------------<  109[H]  4.1   |  21[L]  |  2.47[H]    Ca    9.8      22 Feb 2025 06:08  Phos  4.1     02-21  Mg     2.3     02-21    TPro  6.4  /  Alb  2.9[L]  /  TBili  0.4  /  DBili  x   /  AST  50[H]  /  ALT  33  /  AlkPhos  148[H]  02-22          CAPILLARY BLOOD GLUCOSE      POCT Blood Glucose.: 121 mg/dL (21 Feb 2025 19:14)    PT/INR - ( 22 Feb 2025 06:08 )   PT: 12.1 sec;   INR: 1.05 ratio         PTT - ( 22 Feb 2025 06:08 )  PTT:52.2 sec  Urinalysis Basic - ( 22 Feb 2025 06:08 )    Color: x / Appearance: x / SG: x / pH: x  Gluc: 109 mg/dL / Ketone: x  / Bili: x / Urobili: x   Blood: x / Protein: x / Nitrite: x   Leuk Esterase: x / RBC: x / WBC x   Sq Epi: x / Non Sq Epi: x / Bacteria: x                      CULTURES:     Culture - Blood (collected 02-19-25 @ 00:18)  Source: .Blood Blood-Peripheral  Preliminary Report (02-22-25 @ 03:01):    No growth at 72 Hours    Culture - Blood (collected 02-19-25 @ 00:18)  Source: .Blood Blood-Peripheral  Preliminary Report (02-22-25 @ 03:01):    No growth at 72 Hours    Culture - Blood (collected 02-16-25 @ 09:35)  Source: .Blood BLOOD  Final Report (02-21-25 @ 15:01):    No growth at 5 days    Culture - Blood (collected 02-16-25 @ 09:25)  Source: .Blood BLOOD  Final Report (02-21-25 @ 15:01):    No growth at 5 days        Culture - Urine (collected 02-16-25 @ 11:47)  Source: Clean Catch Clean Catch (Midstream)  Final Report (02-18-25 @ 11:44):    10,000 - 49,000 CFU/mL Klebsiella pneumoniae  Organism: Klebsiella pneumoniae (02-18-25 @ 11:44)  Organism: Klebsiella pneumoniae (02-18-25 @ 11:44)      -  Levofloxacin: S <=0.5      -  Tobramycin: S <=2      -  Nitrofurantoin: I 64 Should not be used to treat pyelonephritis      -  Aztreonam: S <=4      -  Gentamicin: S <=2      -  Cefazolin: S <=2 For uncomplicated UTI with K. pneumoniae, E. coli, or P. mirablis: MARIELENA <=16 is sensitive and MARIELENA >=32 is resistant. This also predicts results for oral agents cefaclor, cefdinir, cefpodoxime, cefprozil, cefuroxime axetil, cephalexin and locarbef for uncomplicated UTI. Note that some isolates may be susceptible to these agents while testing resistant to cefazolin.      -  Cefepime: S <=2      -  Piperacillin/Tazobactam: S <=8      -  Ciprofloxacin: S <=0.25      -  Imipenem: S <=1      -  Ceftriaxone: S <=1      -  Ampicillin: R >16 These ampicillin results predict results for amoxicillin      Method Type: MARIELENA      -  Meropenem: S <=1      -  Ampicillin/Sulbactam: S <=4/2      -  Cefoxitin: S <=8      -  Cefuroxime: S <=4      -  Amoxicillin/Clavulanic Acid: S <=8/4      -  Trimethoprim/Sulfamethoxazole: S <=0.5/9.5      -  Ertapenem: S <=0.5                Physical Examination:  PULM: Coarse bilaterally   CVS: S1, S2 heard    RADIOLOGY REVIEWED  CXR 2/21: b/l opacities      CT chest:    < from: CT Chest No Cont (02.17.25 @ 16:41) >  INTERPRETATION:  INDICATION: Pneumonia, Covid.    Axial CT images of the chest are obtained without intravenous   administration of contrast.    COMPARISON: Chest CT of January 30, 2025.    Right chest wall Mediport as on the prior study.    LYMPH NODES/MEDIASTINUM: No lymphadenopathy.    VASCULATURE/HEART: No pericardial effusion. Vascular calcifications with   involvement of the aorta and the coronary arteries. Heart size appears   enlarged.    UPPER ABDOMEN: Hypodensity within the partially imaged right kidney   suggestive of a cyst.    LUNGS/PLEURA: Limited evaluation due to significant respiratory motion   artifact.    No pleural effusions.    Interval reaeration of the right lower lobe since January 30, 2025 with   residual linear opacities suggestive of subsegmental atelectasis.    Other bilateral mid to upper lung predominant groundglass opacities with   involvement of all 5 lobes majority of which are new since January 30, 2025.    Collapse of the trachea on this expiratory CT representing   tracheomalacia. Secretions within the trachea.    CHEST WALL: Spinal Degenerative Changes. Lower spinal surgery with   partially imagedhardware. Old healed bilateral rib fractures. Old right   clavicular fracture.    IMPRESSION: Limited chest CT due to respiratory motion artifact.    Extensive bilateral groundglass opacities majority of which are new since   January 30, 2025, nonspecific finding may correspond to the given history   of Covid pneumonia.    Interval reaeration of the right lower lobe since January 30, 2025.    Tracheomalacia.    < end of copied text >

## 2025-02-22 NOTE — CHART NOTE - NSCHARTNOTEFT_GEN_A_CORE
69 year old male with a past medical history of hypertension, hyperlipemia VAZQUEZ (on CPAP at bedtime, NC 2 liters during the day), CKD stage 3, epilepsy, schizophrenia, developmental delay, metastatic testicular cancer (s/p orchiectomy, actively on chemotherapy, last dose of etoposide/cisplatin on 6/2024) presenting with worsening shortness of breath. Patient was recently hospitalized at [facility] from January 27th 2025 to February 6th 2025 for seizure and influenza virus infection, which required intubation. He was sent to rehab (originally from home) from hospital. He returns due to sudden onset shortness of breath and worsening oxygenation. Of note, he tested positive for COVID-19 at facility on 2/13/25 and was not put on any COVID-19 treatment at the time, only guaifenesin and scopolamine patch. Patient was placed on non-rebreather and sent to the hospital on 2/16/25. 2/18 pt w/ increased respiratory requirements xfer from 6L NC to AVAPS. Started on remdesivir. 2/19 pt failed RN administered Dysphagia screen; Klebsiella in urine; transition to HFNC; RN reporting pt w/ difficulty swallowing medication and febrile overnight. 2/20 HFNC lowered to 50L/60% w/ sats in mid 90s.    Swallow hx: Pt well known to this service with bedside and FEES completed during current hospital course. FEES (2/21/25) revealing an clare-pharyngeal dysphagia. Pt w/ premature spillage with mildly thick liquids and subsequent aspiration during the swallow. Cough response noted but ineffective in clearing material from trachea. Improve bolus control and airway protection noted with puree and moderately thick liquids. Rx for puree and moderately thick liquids via TSP.     Today: Chart reviewed. Pt s/p RRT evening 2/21/24 for tachypnea w/ increased work of breath and transitioned to AVAPS/BIPAP. Case discussed with DARIAN Horton w/ recommendations to make pt NPO and consider non-oral nutrition/hydration/medications given increased O2 requirement. Service will continue to follow in house with plan to re-evaluate pending improvement in respiratory status.     Lenka Davis MA CCC-SLP;[employer] 69 year old male with a past medical history of hypertension, hyperlipemia VAZQUEZ (on CPAP at bedtime, NC 2 liters during the day), CKD stage 3, epilepsy, schizophrenia, developmental delay, metastatic testicular cancer (s/p orchiectomy, actively on chemotherapy, last dose of etoposide/cisplatin on 6/2024) presenting with worsening shortness of breath. Patient was recently hospitalized at [facility] from January 27th 2025 to February 6th 2025 for seizure and influenza virus infection, which required intubation. He was sent to rehab (originally from home) from hospital. He returns due to sudden onset shortness of breath and worsening oxygenation. Of note, he tested positive for COVID-19 at facility on 2/13/25 and was not put on any COVID-19 treatment at the time, only guaifenesin and scopolamine patch. Patient was placed on non-rebreather and sent to the hospital on 2/16/25. 2/18 pt w/ increased respiratory requirements xfer from 6L NC to AVAPS. Started on remdesivir. 2/19 pt failed RN administered Dysphagia screen; Klebsiella in urine; transition to HFNC; RN reporting pt w/ difficulty swallowing medication and febrile overnight. 2/20 HFNC lowered to 50L/60% w/ sats in mid 90s.    Swallow hx: Pt well known to this service with bedside and FEES completed during current hospital course. FEES (2/21/25) revealing an clare-pharyngeal dysphagia. Pt w/ premature spillage with mildly thick liquids and subsequent aspiration during the swallow. Cough response noted but ineffective in clearing material from trachea. Improve bolus control and airway protection noted with puree and moderately thick liquids. Rx for puree and moderately thick liquids via TSP.     Today: Chart reviewed. Pt s/p RRT evening 2/21/25 for tachypnea w/ increased work of breath and transitioned to AVAPS/BIPAP. Case discussed with DARIAN Horton w/ recommendations to make pt NPO and consider non-oral nutrition/hydration/medications given increased O2 requirement. Service will continue to follow in house with plan to re-evaluate pending improvement in respiratory status.     Lenka Davis MA CCC-SLP;Teams

## 2025-02-22 NOTE — PROGRESS NOTE ADULT - NS ATTEND AMEND GEN_ALL_CORE FT
Problem/Plan - 1:  ·  Problem: Acute on chronic respiratory failure with hypoxia.   ·  Plan: Likely 2/2 PNA, does not appear to be in decompensated HF  - Agree with holding diuresis   - Appreciate pulmonology.    Problem/Plan - 2:  ·  Problem: SHAGUFTA (acute kidney injury).   ·  Plan: Has a history of CKD stage 3, Cr 2.38 at baseline. Presents with Cr 3.27. Likely pre-renal state  - Appreciate nephrology.    Problem/Plan - 3:  ·  Problem: Chronic heart failure with preserved ejection fraction.   ·  Plan: TTE done here 1/17/25 technically difficult, but LV systolic fxn appears nl without any regional wall motion abnormalities  - Repeat TTE pending

## 2025-02-22 NOTE — PROVIDER CONTACT NOTE (OTHER) - ASSESSMENT
Pt A&Ox3, able to make needs known. Pt remains on AVAPs s/p RRT for tachypnea. No s/s of bleeding. Pt denies cp, denies SOB, denies pain. order for AVAPs is nocturnal but given RRT Questioned if HFNC should be administered for oral meds. /79, HR 84, spO2 98% on AVAPs

## 2025-02-22 NOTE — PROGRESS NOTE ADULT - NS ATTEND AMEND GEN_ALL_CORE FT
AVAPS  ePAP 5 RR 14 FIo2 50% Max Pressure 35 Min Pressure 15 qHS and PRN daytime for increased WOB.  Continue bronchodilators/mucolytics  Continue Decadron 6 mg qd x10 days   Continue Remdesivir  Keep sat>90%

## 2025-02-22 NOTE — PROGRESS NOTE ADULT - PROBLEM SELECTOR PLAN 1
-Recent admission for acute respiratory failure in the setting of grand mal seizures, influenza, PNA. S/p intubation in MICU, was extubated to AVAPS  -Now COVID +  -CXR with low lung volumes, mild congestion. Possible underlying infiltrate  -CT chest with b/l GGO, new since 1/30/25. Likely COVID PNA +/- superimposed bacterial PNA.   -Continue bronchodilators/mucolytics   -Continue ABX  -Continue steroids  -Increased WOB requiring AVAPS  ePAP 5 RR 14 FIo2 50% Max Pressure 35 Min Pressure 15 qHS and PRN daytime for increased WOB.   -Started on HFNC 2/18  -S/p RRT 2/20 for hypoxia - HFNC reportedly had been dislodged - sats improved with AVAPS.   -S/p RRT 2/21 for tachypnea. Pt remains tachypneic at this time, ABG accpetable - continue AVAPS for now. If becomes less tachypneic, can trial back on HFNC. Wean o2 as tolerated, keep sats >90%.

## 2025-02-22 NOTE — PROGRESS NOTE ADULT - ASSESSMENT
69 year old male with a past medical history of hypertension, hyperlipemia VAZQUEZ (on CPAP at bedtime, NC 2 liters during the day), CKD stage 3, epilepsy, schizophrenia, developmental delay, metastatic testicular cancer (s/p orchiectomy, actively on chemotherapy, last dose of etoposide/cisplatin on 6/2024) presenting with worsening shortness of breath. Patient was recently hospitalized at [facility] from January 27th 2025 to February 6th 2025 for seizure and influenza virus infection, which required intubation. He was sent to rehab (originally from home) from hospital. He returns due to sudden onset shortness of breath and worsening oxygenation. Of note, he tested positive for COVID-19       1- SHAGUFTA on CKDIII  2- covid-19  3- anemia  4- cold to touch RLE       SHAGUFTA suspected in setting of new infection. covid 19   cr is now in steady range  hypernatremia trend na today and if higher then will start d5W  decadron 6 mg iv daily   remdisivir 100 mg iv daily  completed   cont norvasc   anemia, trend hgb

## 2025-02-22 NOTE — PROVIDER CONTACT NOTE (OTHER) - ACTION/TREATMENT ORDERED:
per ACP, keep AVAPs on overnight. do not give PO meds, PO hydralazine 25 held; 10 mg hydralazine IVP ordered

## 2025-02-22 NOTE — PROGRESS NOTE ADULT - SUBJECTIVE AND OBJECTIVE BOX
[facility]   [phone]  RENAL FOLLOW UP NOTE  --------------------------------------------------------------------------------  Chief Complaint:    24 hour events/subjective:    seen earlier lethargic today     PAST HISTORY  --------------------------------------------------------------------------------  No significant changes to PMH, PSH, FHx, SHx, unless otherwise noted    ALLERGIES & MEDICATIONS  --------------------------------------------------------------------------------  Allergies    seasonal allergies (Unknown)  penicillin (Hives)    Intolerances    latex (Rash)    Standing Inpatient Medications  acetylcysteine 20%  Inhalation 3 milliLiter(s) Inhalation every 6 hours  albuterol/ipratropium for Nebulization 3 milliLiter(s) Nebulizer every 6 hours  amLODIPine   Tablet 5 milliGRAM(s) Oral daily  atorvastatin 20 milliGRAM(s) Oral at bedtime  cholecalciferol 1000 Unit(s) Oral daily  dexAMETHasone  Injectable 6 milliGRAM(s) IV Push daily  escitalopram 15 milliGRAM(s) Oral with breakfast  gabapentin Solution 150 milliGRAM(s) Oral every 12 hours  heparin  Infusion.  Unit(s)/Hr IV Continuous <Continuous>  hydrALAZINE 25 milliGRAM(s) Oral three times a day  influenza  Vaccine (HIGH DOSE) 0.5 milliLiter(s) IntraMuscular once  lacosamide IVPB 200 milliGRAM(s) IV Intermittent every 12 hours  levETIRAcetam  IVPB 1000 milliGRAM(s) IV Intermittent <User Schedule>  lurasidone 40 milliGRAM(s) Oral at bedtime  metoprolol tartrate Injectable 5 milliGRAM(s) IV Push every 6 hours  pantoprazole    Tablet 40 milliGRAM(s) Oral before breakfast  piperacillin/tazobactam IVPB.. 3.375 Gram(s) IV Intermittent every 8 hours  polyethylene glycol 3350 17 Gram(s) Oral daily  senna 2 Tablet(s) Oral at bedtime    PRN Inpatient Medications  acetaminophen     Tablet .. 650 milliGRAM(s) Oral every 6 hours PRN  aluminum hydroxide/magnesium hydroxide/simethicone Suspension 30 milliLiter(s) Oral every 4 hours PRN  heparin   Injectable 7500 Unit(s) IV Push every 6 hours PRN  heparin   Injectable 3500 Unit(s) IV Push every 6 hours PRN  melatonin 3 milliGRAM(s) Oral at bedtime PRN  ondansetron Injectable 4 milliGRAM(s) IV Push every 8 hours PRN      REVIEW OF SYSTEMS  --------------------------------------------------------------------------------  lethargic     VITALS/PHYSICAL EXAM  --------------------------------------------------------------------------------  T(C): 37 (02-22-25 @ 19:46), Max: 37 (02-22-25 @ 19:46)  HR: 115 (02-22-25 @ 20:13) (75 - 115)  BP: 121/78 (02-22-25 @ 19:46) (121/78 - 175/100)  RR: 34 (02-22-25 @ 20:13) (20 - 34)  SpO2: 98% (02-22-25 @ 20:13) (96% - 99%)  Wt(kg): --        02-21-25 @ 07:01  -  02-22-25 @ 07:00  --------------------------------------------------------  IN: 350 mL / OUT: 1550 mL / NET: -1200 mL    02-22-25 @ 07:01  -  02-22-25 @ 23:17  --------------------------------------------------------  IN: 121 mL / OUT: 1650 mL / NET: -1529 mL      Physical Exam:  	      Gen: ill appearing male, on high flow O2 lethargic   	Pulm: decrease bs, +coarse   	CV: No JVD. RRR, S1S2; no rub  	Abd: +BS, soft, nontender/nondistended  	: No suprapubic tenderness, external catheter  	UE: Warm, no cyanosis  no clubbing,  no edema;  	LE: Warm, no cyanosis  no clubbing, no edema        LABS/STUDIES  --------------------------------------------------------------------------------              8.6    8.59  >-----------<  106      [02-22-25 @ 06:08]              28.3     149  |  114  |  54  ----------------------------<  109      [02-22-25 @ 06:08]  4.1   |  21  |  2.47        Ca     9.8     [02-22-25 @ 06:08]      Mg     2.3     [02-21-25 @ 19:36]      Phos  4.1     [02-21-25 @ 19:36]    TPro  6.4  /  Alb  2.9  /  TBili  0.4  /  DBili  x   /  AST  50  /  ALT  33  /  AlkPhos  148  [02-22-25 @ 06:08]    PT/INR: PT 12.1 , INR 1.05       [02-22-25 @ 06:08]  PTT: 58.8       [02-22-25 @ 19:06]      Creatinine Trend:  SCr 2.47 [02-22 @ 06:08]  SCr 2.54 [02-21 @ 19:36]  SCr 2.44 [02-21 @ 14:13]  SCr 0.68 [02-21 @ 01:02]  SCr 2.31 [02-20 @ 06:17]          Urine Creatinine 159      [02-16-25 @ 13:44]  Urine Urea Nitrogen 655      [02-16-25 @ 13:44]    TSH 0.87      [01-25-25 @ 11:31]

## 2025-02-22 NOTE — PROGRESS NOTE ADULT - ASSESSMENT
70 y/o M with PMH of HTN, HLD, VAZQUEZ on CPAP and home O2 (2LNC daytime), CKD, epilepsy, schizophrenia developmental delay, metastatic testicular CA. Recent admission at [facility] for acute respiratory failure in the setting of seizures, influenza infection, pulmonary edema, PNA requiring intubation in MICU. Was eventually discharged to rehab. Presents now with SOB, found to be COVID + at facility on 2/13. Febrile on admission to ED to 101.9F. Placed on Bipap for increased WOB. Pulmonary called to consult for acute respiratory failure.

## 2025-02-22 NOTE — PROGRESS NOTE ADULT - SUBJECTIVE AND OBJECTIVE BOX
[facility] HEMATOLOGY/ONCOLOGY INPATIENT PROGRESS NOTE     Interval Hx:   02-22-25: Mr. Gr was seen at bedside today.    Meds:   MEDICATIONS  (STANDING):  acetylcysteine 20%  Inhalation 3 milliLiter(s) Inhalation every 6 hours  albuterol/ipratropium for Nebulization 3 milliLiter(s) Nebulizer every 6 hours  amLODIPine   Tablet 5 milliGRAM(s) Oral daily  atorvastatin 20 milliGRAM(s) Oral at bedtime  cholecalciferol 1000 Unit(s) Oral daily  dexAMETHasone  Injectable 6 milliGRAM(s) IV Push daily  escitalopram 15 milliGRAM(s) Oral with breakfast  gabapentin Solution 150 milliGRAM(s) Oral every 12 hours  heparin  Infusion.  Unit(s)/Hr (17 mL/Hr) IV Continuous <Continuous>  hydrALAZINE 25 milliGRAM(s) Oral three times a day  influenza  Vaccine (HIGH DOSE) 0.5 milliLiter(s) IntraMuscular once  lacosamide IVPB 200 milliGRAM(s) IV Intermittent every 12 hours  levETIRAcetam  IVPB 1000 milliGRAM(s) IV Intermittent <User Schedule>  lurasidone 40 milliGRAM(s) Oral at bedtime  metoprolol tartrate Injectable 5 milliGRAM(s) IV Push every 6 hours  pantoprazole    Tablet 40 milliGRAM(s) Oral before breakfast  piperacillin/tazobactam IVPB.. 3.375 Gram(s) IV Intermittent every 8 hours  polyethylene glycol 3350 17 Gram(s) Oral daily  remdesivir  IVPB   IV Intermittent   remdesivir  IVPB 100 milliGRAM(s) IV Intermittent every 24 hours  senna 2 Tablet(s) Oral at bedtime    MEDICATIONS  (PRN):  acetaminophen     Tablet .. 650 milliGRAM(s) Oral every 6 hours PRN Temp greater or equal to 38C (100.4F), Mild Pain (1 - 3)  aluminum hydroxide/magnesium hydroxide/simethicone Suspension 30 milliLiter(s) Oral every 4 hours PRN Dyspepsia  heparin   Injectable 7500 Unit(s) IV Push every 6 hours PRN For aPTT less than 40  heparin   Injectable 3500 Unit(s) IV Push every 6 hours PRN For aPTT between 40 - 57  melatonin 3 milliGRAM(s) Oral at bedtime PRN Insomnia  ondansetron Injectable 4 milliGRAM(s) IV Push every 8 hours PRN Nausea and/or Vomiting    Vital Signs Last 24 Hrs  T(C): 36.6 (22 Feb 2025 04:31), Max: 36.8 (21 Feb 2025 10:49)  T(F): 97.8 (22 Feb 2025 04:31), Max: 98.3 (21 Feb 2025 10:49)  HR: 94 (22 Feb 2025 04:31) (78 - 100)  BP: 153/92 (22 Feb 2025 04:31) (136/85 - 163/79)  BP(mean): --  RR: 22 (22 Feb 2025 04:31) (22 - 22)  SpO2: 98% (22 Feb 2025 04:31) (93% - 100%)    Parameters below as of 22 Feb 2025 04:31  Patient On (Oxygen Delivery Method): BiPAP/CPAP    Physical Exam:  Gen: NAD  HEENT: EOMI, MMM  Chest: equal chest rise  Cardiac: regular   Abd: non distended   Neuro: AAOx3     Labs:                        8.7    7.85  )-----------( 102      ( 21 Feb 2025 19:44 )             28.1     CBC Full  -  ( 21 Feb 2025 19:44 )  WBC Count : 7.85 K/uL  RBC Count : 2.82 M/uL  Hemoglobin : 8.7 g/dL  Hematocrit : 28.1 %  Platelet Count - Automated : 102 K/uL  Mean Cell Volume : 99.6 fl  Mean Cell Hemoglobin : 30.9 pg  Mean Cell Hemoglobin Concentration : 31.0 g/dL    02-21    x   |  x   |  x   ----------------------------<  x   x    |  x   |  2.54[H]    Ca    9.6      21 Feb 2025 14:13  Phos  4.1     02-21  Mg     2.3     02-21    TPro  6.4  /  Alb  2.8[L]  /  TBili  0.5  /  DBili  0.2  /  AST  52[H]  /  ALT  32  /  AlkPhos  150[H]  02-21    PT/INR - ( 21 Feb 2025 19:36 )   PT: 12.2 sec;   INR: 1.06 ratio         PTT - ( 21 Feb 2025 06:30 )  PTT:69.0 sec  Bilirubin Total: 0.5 mg/dL (02-21-25 @ 19:36)   [facility] HEMATOLOGY/ONCOLOGY INPATIENT PROGRESS NOTE     Interval Hx:   02-22-25: Mr. Gr was seen at bedside today, continues on AVAPS this morning, noted RRT overnight for hypoxia, hypercapnia, ICU following, US LE negative for DVT, counts overall stable/improved     Meds:   MEDICATIONS  (STANDING):  acetylcysteine 20%  Inhalation 3 milliLiter(s) Inhalation every 6 hours  albuterol/ipratropium for Nebulization 3 milliLiter(s) Nebulizer every 6 hours  amLODIPine   Tablet 5 milliGRAM(s) Oral daily  atorvastatin 20 milliGRAM(s) Oral at bedtime  cholecalciferol 1000 Unit(s) Oral daily  dexAMETHasone  Injectable 6 milliGRAM(s) IV Push daily  escitalopram 15 milliGRAM(s) Oral with breakfast  gabapentin Solution 150 milliGRAM(s) Oral every 12 hours  heparin  Infusion.  Unit(s)/Hr (17 mL/Hr) IV Continuous <Continuous>  hydrALAZINE 25 milliGRAM(s) Oral three times a day  influenza  Vaccine (HIGH DOSE) 0.5 milliLiter(s) IntraMuscular once  lacosamide IVPB 200 milliGRAM(s) IV Intermittent every 12 hours  levETIRAcetam  IVPB 1000 milliGRAM(s) IV Intermittent <User Schedule>  lurasidone 40 milliGRAM(s) Oral at bedtime  metoprolol tartrate Injectable 5 milliGRAM(s) IV Push every 6 hours  pantoprazole    Tablet 40 milliGRAM(s) Oral before breakfast  piperacillin/tazobactam IVPB.. 3.375 Gram(s) IV Intermittent every 8 hours  polyethylene glycol 3350 17 Gram(s) Oral daily  remdesivir  IVPB   IV Intermittent   remdesivir  IVPB 100 milliGRAM(s) IV Intermittent every 24 hours  senna 2 Tablet(s) Oral at bedtime    MEDICATIONS  (PRN):  acetaminophen     Tablet .. 650 milliGRAM(s) Oral every 6 hours PRN Temp greater or equal to 38C (100.4F), Mild Pain (1 - 3)  aluminum hydroxide/magnesium hydroxide/simethicone Suspension 30 milliLiter(s) Oral every 4 hours PRN Dyspepsia  heparin   Injectable 7500 Unit(s) IV Push every 6 hours PRN For aPTT less than 40  heparin   Injectable 3500 Unit(s) IV Push every 6 hours PRN For aPTT between 40 - 57  melatonin 3 milliGRAM(s) Oral at bedtime PRN Insomnia  ondansetron Injectable 4 milliGRAM(s) IV Push every 8 hours PRN Nausea and/or Vomiting    Vital Signs Last 24 Hrs  T(C): 36.6 (22 Feb 2025 04:31), Max: 36.8 (21 Feb 2025 10:49)  T(F): 97.8 (22 Feb 2025 04:31), Max: 98.3 (21 Feb 2025 10:49)  HR: 94 (22 Feb 2025 04:31) (78 - 100)  BP: 153/92 (22 Feb 2025 04:31) (136/85 - 163/79)  BP(mean): --  RR: 22 (22 Feb 2025 04:31) (22 - 22)  SpO2: 98% (22 Feb 2025 04:31) (93% - 100%)    Parameters below as of 22 Feb 2025 04:31  Patient On (Oxygen Delivery Method): BiPAP/CPAP    Physical Exam:  Gen: NAD  HEENT: EOMI, MMM  Chest: equal chest rise  Cardiac: regular   Abd: non distended   Neuro: AAOx3     Labs:                        8.7    7.85  )-----------( 102      ( 21 Feb 2025 19:44 )             28.1     CBC Full  -  ( 21 Feb 2025 19:44 )  WBC Count : 7.85 K/uL  RBC Count : 2.82 M/uL  Hemoglobin : 8.7 g/dL  Hematocrit : 28.1 %  Platelet Count - Automated : 102 K/uL  Mean Cell Volume : 99.6 fl  Mean Cell Hemoglobin : 30.9 pg  Mean Cell Hemoglobin Concentration : 31.0 g/dL    02-21    x   |  x   |  x   ----------------------------<  x   x    |  x   |  2.54[H]    Ca    9.6      21 Feb 2025 14:13  Phos  4.1     02-21  Mg     2.3     02-21    TPro  6.4  /  Alb  2.8[L]  /  TBili  0.5  /  DBili  0.2  /  AST  52[H]  /  ALT  32  /  AlkPhos  150[H]  02-21    PT/INR - ( 21 Feb 2025 19:36 )   PT: 12.2 sec;   INR: 1.06 ratio         PTT - ( 21 Feb 2025 06:30 )  PTT:69.0 sec  Bilirubin Total: 0.5 mg/dL (02-21-25 @ 19:36)

## 2025-02-22 NOTE — PROGRESS NOTE ADULT - ASSESSMENT
Mr. Gr is a 69 year old male with PMHx of seizure disorder, sleep apnea, HTN, chronic idiopathic constipation, stage III CKD, severe obesity, secondary hyperparathyroidism, anemia, thrombocytopenia, hyperlipidemia, testicular cancer under treatment with Dr. Ovalles who is admitted for acute hypoxic respiratory failure secondary to COVID vs superimposed pneumonia with new onset thrombocytopenia. He was recently discharged 02/06/2025 after a tenous stay including intubation in the ICU for respiratory failure in setting of seizure. He had recovered and was discharged to rehab.      Former smoker, quit 14y prior, denied ETOH, drug use. Lives at home. Does not have children. Denies family history of blood disorders or malignancy.  Denies previous workplace related exposures.  Denies previous history of blood transfusions.  Denied history of thromboses.  Denied history of  requiring anticoagulation.     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  Margins were negative.  pT3Nx. CT C/A/P with few left para-aortic and left iliac chain lymph nodes largest measuring 8.1 cm left para-aortic node, bilateral subcentimeter noncalcified pulmonary nodules measuring up to 7 mm. AFP, LDH, hCG were all elevated. 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 Started on adjuvant therapy with Cisplatin+Etoposide, so far completed 4 cycles of treatment with cisplatin dose reduced 50% and Etoposide 50% due to thrombocytopenia.      02/19/2025: on HFNC, noted tachycardia and fever, Klebsiella in urine as well, thrombocytopenia stable/improving, no signs of active bleeding     02/20/2025: developed A.fib with RVR and was placed on heparin drip and pending cardiology eval    02/21/2025: awake, on AVAPS overnight, noted RRT for hypoxia as pt removed HFNC at some point in time, good response thereafter       #Acute hypoxic respiratory failure  # COVID  - CT noted with bilateral GGO  - ID following  - Pulmonary following  - Antibiotics per primary team and ID     # Thrombocytopenia   - Did occur during most recent admission and improved to normal prior to discharge   - Without signs of bleeding  - Could be multifactorial, possibly due to infection   - Continue to trend  - Transfuse to maintain Plt > 10k unless actively bleeding then maintain > 50k     # Brain lesion  - pt with hx of seizures  - MRI brain now with 5.4 mm enhancing lesion in the LEFT middle cerebellar peduncle with associated edema suspicious for metastases  - MRI Lumbar negative for acute disease  - Small lesion without mass effect or edema, recommended to correlate per neurology if foci and locus are concordant with seizure activity  - Neurology recs noted, new lesion not likely to cause seizure  - It is rare, but nonetheless not impossible, for testicular cancer to be metastatic to the brain  - He will need another PET-CT, can be completed outpatient once stable if concern can proceed with biopsy at that time   - Previous endocrinology eval for thyroid nodule noted  - AFP/BHCH normal,  previously      # 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 Franki Ovalles         Thank you for allowing me to participate in the care of Mr. Gr please do not hesitate to call or text me if you have further questions or concerns.     Brayan Mart MD  [facility]   Division of Hematology/Oncology  [address] [place] [address], [address]  [place] [address]  P: [phone]  F: [phone]    Attestation:    ----Pt evaluated including face-to-face interaction in addition to chart review, reviewing treatment plan, and managing the patient’s chronic diagnoses as listed in the assessment----        Mr. Gr is a 69 year old male with PMHx of seizure disorder, sleep apnea, HTN, chronic idiopathic constipation, stage III CKD, severe obesity, secondary hyperparathyroidism, anemia, thrombocytopenia, hyperlipidemia, testicular cancer under treatment with Dr. Ovalles who is admitted for acute hypoxic respiratory failure secondary to COVID vs superimposed pneumonia with new onset thrombocytopenia. He was recently discharged 02/06/2025 after a tenous stay including intubation in the ICU for respiratory failure in setting of seizure. He had recovered and was discharged to rehab.      Former smoker, quit 14y prior, denied ETOH, drug use. Lives at home. Does not have children. Denies family history of blood disorders or malignancy.  Denies previous workplace related exposures.  Denies previous history of blood transfusions.  Denied history of thromboses.  Denied history of  requiring anticoagulation.     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  Margins were negative.  pT3Nx. CT C/A/P with few left para-aortic and left iliac chain lymph nodes largest measuring 8.1 cm left para-aortic node, bilateral subcentimeter noncalcified pulmonary nodules measuring up to 7 mm. AFP, LDH, hCG were all elevated. 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 Started on adjuvant therapy with Cisplatin+Etoposide, so far completed 4 cycles of treatment with cisplatin dose reduced 50% and Etoposide 50% due to thrombocytopenia.      02/19/2025: on HFNC, noted tachycardia and fever, Klebsiella in urine as well, thrombocytopenia stable/improving, no signs of active bleeding     02/20/2025: developed A.fib with RVR and was placed on heparin drip and pending cardiology eval    02/21/2025: awake, on AVAPS overnight, noted RRT for hypoxia as pt removed HFNC at some point in time, good response thereafter     02/22/2025:  continues on AVAPS this morning, noted RRT overnight for hypoxia, hypercapnia, ICU following, US LE negative for DVT, counts overall stable/improved       #Acute hypoxic respiratory failure  # COVID  - CT noted with bilateral GGO  - ID following  - Pulmonary following  - Antibiotics per primary team and ID     # Thrombocytopenia   - Did occur during most recent admission and improved to normal prior to discharge   - Without signs of bleeding  - Could be multifactorial, possibly due to infection   - Continue to trend  - Transfuse to maintain Plt > 10k unless actively bleeding then maintain > 50k     # Brain lesion  - pt with hx of seizures  - MRI brain now with 5.4 mm enhancing lesion in the LEFT middle cerebellar peduncle with associated edema suspicious for metastases  - MRI Lumbar negative for acute disease  - Small lesion without mass effect or edema, recommended to correlate per neurology if foci and locus are concordant with seizure activity  - Neurology recs noted, new lesion not likely to cause seizure  - It is rare, but nonetheless not impossible, for testicular cancer to be metastatic to the brain  - He will need another PET-CT, can be completed outpatient once stable if concern can proceed with biopsy at that time   - Previous endocrinology eval for thyroid nodule noted  - AFP/BHCH normal,  previously      # 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 Ovalles, agree with current plan  - Follow up as outpatient with Franki Ovalles MD     # Acute kidney injury on CKD Stage IIIA  - Likely multifactorial  - Nephrology consult and recs noted  - Continue to trend     # Normocytic anemia  - Chronic, has hx of PRBC during chemo 07/2024  - Noted Anti E, Anti-K Anti-C antibodies previously  - Monitor for bleeding  - Recommend to transfuse to maintain Hb > 7    # Klebsiella UTI  -Antibiotics per ID and primary team       Recommendations  - Trend with CBC daily   - Transfuse to maintain Hb > 7   - Transfuse to maintain Plt >10k unless active bleeding then >50k   - Monitor renal function  - Antibiotics per primary team and ID   - Cardiology follow up to address anticoagulation, currently on heparin drip   - f/u ICU recs   - Outpatient follow up with Dr. Franki Ovalles         Thank you for allowing me to participate in the care of Mr. Gr please do not hesitate to call or text me if you have further questions or concerns.     Brayan Mart MD  [facility]   Division of Hematology/Oncology  [address] [place] [address], [address]  [place] [address]  P: [phone]  F: [phone]    Attestation:    ----Pt evaluated including face-to-face interaction in addition to chart review, reviewing treatment plan, and managing the patient’s chronic diagnoses as listed in the assessment----

## 2025-02-22 NOTE — PROGRESS NOTE ADULT - SUBJECTIVE AND OBJECTIVE BOX
DATE OF SERVICE: 02-22-25 @ 15:52    Patient is a 69y old  Male who presents with a chief complaint of Acute on chronic hypoxemic respiratory failure (22 Feb 2025 10:33)      INTERVAL HISTORY: RRT yesterday due to hypoxia, currently comfortable on BiPAP    REVIEW OF SYSTEMS:  CONSTITUTIONAL: No weakness  EYES/ENT: No visual changes;  No throat pain   NECK: No pain or stiffness  RESPIRATORY: No cough, wheezing; No shortness of breath  CARDIOVASCULAR: No chest pain or palpitations  GASTROINTESTINAL: No abdominal  pain. No nausea, vomiting, or hematemesis  GENITOURINARY: No dysuria, frequency or hematuria  NEUROLOGICAL: No stroke like symptoms  SKIN: No rashes       	  MEDICATIONS:  amLODIPine   Tablet 5 milliGRAM(s) Oral daily  hydrALAZINE 25 milliGRAM(s) Oral three times a day  metoprolol tartrate Injectable 5 milliGRAM(s) IV Push every 6 hours        PHYSICAL EXAM:  T(C): 36.5 (02-22-25 @ 10:56), Max: 36.6 (02-21-25 @ 20:57)  HR: 90 (02-22-25 @ 10:56) (75 - 100)  BP: 145/78 (02-22-25 @ 10:56) (136/85 - 163/79)  RR: 20 (02-22-25 @ 10:56) (20 - 22)  SpO2: 99% (02-22-25 @ 10:56) (93% - 100%)  Wt(kg): --  I&O's Summary    21 Feb 2025 07:01  -  22 Feb 2025 07:00  --------------------------------------------------------  IN: 350 mL / OUT: 1550 mL / NET: -1200 mL    22 Feb 2025 07:01  -  22 Feb 2025 15:52  --------------------------------------------------------  IN: 77 mL / OUT: 750 mL / NET: -673 mL          Appearance: In no distress	  HEENT:    PERRL, EOMI	  Cardiovascular:  S1 S2, No JVD  Respiratory: Lungs clear to auscultation	  Gastrointestinal:  Soft, Non-tender, + BS	  Vascularature:  No edema of LE  Psychiatric: Appropriate affect   Neuro: no acute focal deficits                               8.6    8.59  )-----------( 106      ( 22 Feb 2025 06:08 )             28.3     02-22    149[H]  |  114[H]  |  54[H]  ----------------------------<  109[H]  4.1   |  21[L]  |  2.47[H]    Ca    9.8      22 Feb 2025 06:08  Phos  4.1     02-21  Mg     2.3     02-21    TPro  6.4  /  Alb  2.9[L]  /  TBili  0.4  /  DBili  x   /  AST  50[H]  /  ALT  33  /  AlkPhos  148[H]  02-22        Labs personally reviewed      ASSESSMENT/PLAN: 	      69 year old male with a past medical history of hypertension, hyperlipemia VAZQUEZ (on CPAP at bedtime, NC 2 liters during the day), CKD stage 3, epilepsy, schizophrenia, developmental delay, metastatic testicular cancer (s/p orchiectomy, actively on chemotherapy, last dose of etoposide/cisplatin on 6/2024), presenting with acute on chronic hypoxemic respiratory failure, secondary to (a) COVID-19 infection, (b) superimposed pneumonia after recent influenza infection, and/or (c) fluid overload.     Problem/Plan - 1:  ·  Problem: Acute on chronic respiratory failure with hypoxia.   ·  Plan: Likely 2/2 PNA, does not appear to be in decompensated HF  - Agree with holding diuresis   - Appreciate pulmonology.    Problem/Plan - 2:  ·  Problem: SHAGUFTA (acute kidney injury).   ·  Plan: Has a history of CKD stage 3, Cr 2.38 at baseline. Presents with Cr 3.27. Likely pre-renal state  - Appreciate nephrology.    Problem/Plan - 3:  ·  Problem: Chronic heart failure with preserved ejection fraction.   ·  Plan: TTE done here 1/17/25 technically difficult, but LV systolic fxn appears nl without any regional wall motion abnormalities  - Repeat TTE pending  - BNP 5000 <---1100 but appears euvolemic   - Hold off diuretics     Problem/Plan - 4:  ·  Problem: New onset Afib RVR (on tele, confirmed with EKG 2/19)   ·  Plan: Started metoprolol 5mg IVP q6 hours & Hep gtt  - If rate remains uncontrolled, can start Cardizem drip at 5mg/h  - Initiated hep gtt and will transition to Eliquis     Problem/Plan - 5:  ·  Problem: HTN    ·  Plan: Start Hydral 25mg TID & amlodipine 5mg for uncontrolled BP (2/19)  - improved    Problem/Plan - 6:  ·  Problem: Prophylactic measure.   ·  Plan:  DVT prophylaxis: heparin gtt          TUAN Cristina DO [facility]  Cardiovascular Medicine  [address] [place] [address], Suite [facility]  Office: [phone]  Available via call/text on [employer] Teams  DATE OF SERVICE: 02-22-25 @ 15:52    Patient is a 69y old  Male who presents with a chief complaint of Acute on chronic hypoxemic respiratory failure (22 Feb 2025 10:33)      INTERVAL HISTORY: RRT yesterday due to hypoxia, currently comfortable on BiPAP    REVIEW OF SYSTEMS:  CONSTITUTIONAL: No weakness  EYES/ENT: No visual changes;  No throat pain   NECK: No pain or stiffness  RESPIRATORY: No cough, wheezing; No shortness of breath  CARDIOVASCULAR: No chest pain or palpitations  GASTROINTESTINAL: No abdominal  pain. No nausea, vomiting, or hematemesis  GENITOURINARY: No dysuria, frequency or hematuria  NEUROLOGICAL: No stroke like symptoms  SKIN: No rashes       	  MEDICATIONS:  amLODIPine   Tablet 5 milliGRAM(s) Oral daily  hydrALAZINE 25 milliGRAM(s) Oral three times a day  metoprolol tartrate Injectable 5 milliGRAM(s) IV Push every 6 hours        PHYSICAL EXAM:  T(C): 36.5 (02-22-25 @ 10:56), Max: 36.6 (02-21-25 @ 20:57)  HR: 90 (02-22-25 @ 10:56) (75 - 100)  BP: 145/78 (02-22-25 @ 10:56) (136/85 - 163/79)  RR: 20 (02-22-25 @ 10:56) (20 - 22)  SpO2: 99% (02-22-25 @ 10:56) (93% - 100%)  Wt(kg): --  I&O's Summary    21 Feb 2025 07:01  -  22 Feb 2025 07:00  --------------------------------------------------------  IN: 350 mL / OUT: 1550 mL / NET: -1200 mL    22 Feb 2025 07:01  -  22 Feb 2025 15:52  --------------------------------------------------------  IN: 77 mL / OUT: 750 mL / NET: -673 mL          Appearance: In no distress	  HEENT:    PERRL, EOMI	  Cardiovascular:  S1 S2, No JVD  Respiratory: Lungs clear to auscultation	  Gastrointestinal:  Soft, Non-tender, + BS	  Vascularature:  No edema of LE  Psychiatric: Appropriate affect   Neuro: no acute focal deficits                               8.6    8.59  )-----------( 106      ( 22 Feb 2025 06:08 )             28.3     02-22    149[H]  |  114[H]  |  54[H]  ----------------------------<  109[H]  4.1   |  21[L]  |  2.47[H]    Ca    9.8      22 Feb 2025 06:08  Phos  4.1     02-21  Mg     2.3     02-21    TPro  6.4  /  Alb  2.9[L]  /  TBili  0.4  /  DBili  x   /  AST  50[H]  /  ALT  33  /  AlkPhos  148[H]  02-22        Labs personally reviewed      ASSESSMENT/PLAN: 	    69 year old male with a past medical history of hypertension, hyperlipemia VAZQUEZ (on CPAP at bedtime, NC 2 liters during the day), CKD stage 3, epilepsy, schizophrenia, developmental delay, metastatic testicular cancer (s/p orchiectomy, actively on chemotherapy, last dose of etoposide/cisplatin on 6/2024), presenting with acute on chronic hypoxemic respiratory failure, secondary to (a) COVID-19 infection, (b) superimposed pneumonia after recent influenza infection, and/or (c) fluid overload.     Problem/Plan - 1:  ·  Problem: Acute on chronic respiratory failure with hypoxia.   ·  Plan: Likely 2/2 PNA, does not appear to be in decompensated HF  - Agree with holding diuresis   - Appreciate pulmonology.    Problem/Plan - 2:  ·  Problem: SHAGUFTA (acute kidney injury).   ·  Plan: Has a history of CKD stage 3, Cr 2.38 at baseline. Presents with Cr 3.27. Likely pre-renal state  - Appreciate nephrology.    Problem/Plan - 3:  ·  Problem: Chronic heart failure with preserved ejection fraction.   ·  Plan: TTE done here 1/17/25 technically difficult, but LV systolic fxn appears nl without any regional wall motion abnormalities  - Repeat TTE pending  - BNP 5000 <---1100 but appears euvolemic   - Hold off diuretics     Problem/Plan - 4:  ·  Problem: New onset Afib RVR (on tele, confirmed with EKG 2/19)   ·  Plan: Started metoprolol 5mg IVP q6 hours & Hep gtt  - If rate remains uncontrolled, can start Cardizem drip at 5mg/h  - Initiated hep gtt and will transition to Eliquis     Problem/Plan - 5:  ·  Problem: HTN    ·  Plan: Start Hydral 25mg TID & amlodipine 5mg for uncontrolled BP (2/19)  - improved    Problem/Plan - 6:  ·  Problem: Prophylactic measure.   ·  Plan:  DVT prophylaxis: heparin gtt          TUAN Cristina DO [facility]  Cardiovascular Medicine  [address] [place] [address], Suite [facility]  Office: [phone]  Available via call/text on [employer] Teams

## 2025-02-22 NOTE — PROGRESS NOTE ADULT - NSPROGADDITIONALINFOA_GEN_ALL_CORE
acute respiratory failure with hypoxia on hi-flow for meal breaks <--> bipap  speech & swallow eval appreciated, s/p FEES. now on thicken pureed diet.  events reviewed, RRT for hypoxia, ICU eval appreciated  currently comfortable on bipap though labile respiratory status  Na slightly up, Cr stable. d/w renal, holding IVF for now.  yesterday he was able to eat per renal.  d/w pt's sister Ester RASHID (retired RN), clinical status updated in details. All questions answered.  GOC discussed, full code per the sister.   critically ill.   d/w NP Sallie.     - Dr. SHIRA Meltonet ([facility])  - [phone]

## 2025-02-23 LAB
ALBUMIN SERPL ELPH-MCNC: 2.4 G/DL — LOW (ref 3.3–5)
ALP SERPL-CCNC: 116 U/L — SIGNIFICANT CHANGE UP (ref 40–120)
ALT FLD-CCNC: 25 U/L — SIGNIFICANT CHANGE UP (ref 10–45)
ANION GAP SERPL CALC-SCNC: 14 MMOL/L — SIGNIFICANT CHANGE UP (ref 5–17)
ANION GAP SERPL CALC-SCNC: 16 MMOL/L — SIGNIFICANT CHANGE UP (ref 5–17)
ANISOCYTOSIS BLD QL: SLIGHT — SIGNIFICANT CHANGE UP
APTT BLD: 103 SEC — HIGH (ref 24.5–35.6)
APTT BLD: 52.8 SEC — HIGH (ref 24.5–35.6)
APTT BLD: 94.7 SEC — HIGH (ref 24.5–35.6)
AST SERPL-CCNC: 29 U/L — SIGNIFICANT CHANGE UP (ref 10–40)
BASE EXCESS BLDA CALC-SCNC: -4.8 MMOL/L — LOW (ref -2–3)
BASOPHILS # BLD AUTO: 0 K/UL — SIGNIFICANT CHANGE UP (ref 0–0.2)
BASOPHILS NFR BLD AUTO: 0 % — SIGNIFICANT CHANGE UP (ref 0–2)
BILIRUB SERPL-MCNC: 0.4 MG/DL — SIGNIFICANT CHANGE UP (ref 0.2–1.2)
BUN SERPL-MCNC: 54 MG/DL — HIGH (ref 7–23)
BUN SERPL-MCNC: 60 MG/DL — HIGH (ref 7–23)
CALCIUM SERPL-MCNC: 10 MG/DL — SIGNIFICANT CHANGE UP (ref 8.4–10.5)
CALCIUM SERPL-MCNC: 9.4 MG/DL — SIGNIFICANT CHANGE UP (ref 8.4–10.5)
CHLORIDE SERPL-SCNC: 114 MMOL/L — HIGH (ref 96–108)
CHLORIDE SERPL-SCNC: 114 MMOL/L — HIGH (ref 96–108)
CK SERPL-CCNC: 42 U/L — SIGNIFICANT CHANGE UP (ref 30–200)
CO2 BLDA-SCNC: 25 MMOL/L — HIGH (ref 19–24)
CO2 SERPL-SCNC: 19 MMOL/L — LOW (ref 22–31)
CO2 SERPL-SCNC: 21 MMOL/L — LOW (ref 22–31)
CREAT SERPL-MCNC: 2.64 MG/DL — HIGH (ref 0.5–1.3)
CREAT SERPL-MCNC: 3.65 MG/DL — HIGH (ref 0.5–1.3)
CRP SERPL-MCNC: 178 MG/L — HIGH (ref 0–4)
D DIMER BLD IA.RAPID-MCNC: 2027 NG/ML DDU — HIGH
EGFR: 17 ML/MIN/1.73M2 — LOW
EGFR: 17 ML/MIN/1.73M2 — LOW
EGFR: 26 ML/MIN/1.73M2 — LOW
EGFR: 26 ML/MIN/1.73M2 — LOW
EOSINOPHIL # BLD AUTO: 0.31 K/UL — SIGNIFICANT CHANGE UP (ref 0–0.5)
EOSINOPHIL NFR BLD AUTO: 3.5 % — SIGNIFICANT CHANGE UP (ref 0–6)
FERRITIN SERPL-MCNC: 5943 NG/ML — HIGH (ref 30–400)
GAS PNL BLDA: SIGNIFICANT CHANGE UP
GAS PNL BLDV: SIGNIFICANT CHANGE UP
GAS PNL BLDV: SIGNIFICANT CHANGE UP
GLUCOSE BLDC GLUCOMTR-MCNC: 148 MG/DL — HIGH (ref 70–99)
GLUCOSE SERPL-MCNC: 124 MG/DL — HIGH (ref 70–99)
GLUCOSE SERPL-MCNC: 170 MG/DL — HIGH (ref 70–99)
HCO3 BLDA-SCNC: 24 MMOL/L — SIGNIFICANT CHANGE UP (ref 21–28)
HCT VFR BLD CALC: 25.9 % — LOW (ref 39–50)
HCT VFR BLD CALC: 26.4 % — LOW (ref 39–50)
HCT VFR BLD CALC: 29.8 % — LOW (ref 39–50)
HGB BLD-MCNC: 7.9 G/DL — LOW (ref 13–17)
HGB BLD-MCNC: 8.1 G/DL — LOW (ref 13–17)
HGB BLD-MCNC: 9.2 G/DL — LOW (ref 13–17)
INR BLD: 1.12 RATIO — SIGNIFICANT CHANGE UP (ref 0.85–1.16)
INR BLD: 1.2 RATIO — HIGH (ref 0.85–1.16)
LDH SERPL L TO P-CCNC: 496 U/L — HIGH (ref 50–242)
LYMPHOCYTES # BLD AUTO: 0.62 K/UL — LOW (ref 1–3.3)
LYMPHOCYTES # BLD AUTO: 7.1 % — LOW (ref 13–44)
MACROCYTES BLD QL: SLIGHT — SIGNIFICANT CHANGE UP
MAGNESIUM SERPL-MCNC: 2.1 MG/DL — SIGNIFICANT CHANGE UP (ref 1.6–2.6)
MANUAL SMEAR VERIFICATION: SIGNIFICANT CHANGE UP
MCHC RBC-ENTMCNC: 30.5 G/DL — LOW (ref 32–36)
MCHC RBC-ENTMCNC: 30.7 G/DL — LOW (ref 32–36)
MCHC RBC-ENTMCNC: 30.9 G/DL — LOW (ref 32–36)
MCHC RBC-ENTMCNC: 32 PG — SIGNIFICANT CHANGE UP (ref 27–34)
MCHC RBC-ENTMCNC: 32 PG — SIGNIFICANT CHANGE UP (ref 27–34)
MCHC RBC-ENTMCNC: 32.1 PG — SIGNIFICANT CHANGE UP (ref 27–34)
MCV RBC AUTO: 103.8 FL — HIGH (ref 80–100)
MCV RBC AUTO: 104.3 FL — HIGH (ref 80–100)
MCV RBC AUTO: 104.9 FL — HIGH (ref 80–100)
METAMYELOCYTES # FLD: 0.9 % — HIGH (ref 0–0)
METAMYELOCYTES NFR BLD: 0.9 % — HIGH (ref 0–0)
MONOCYTES # BLD AUTO: 0.08 K/UL — SIGNIFICANT CHANGE UP (ref 0–0.9)
MONOCYTES NFR BLD AUTO: 0.9 % — LOW (ref 2–14)
NEUTROPHILS # BLD AUTO: 7.67 K/UL — HIGH (ref 1.8–7.4)
NEUTROPHILS NFR BLD AUTO: 87.6 % — HIGH (ref 43–77)
NRBC BLD AUTO-RTO: 0 /100 WBCS — SIGNIFICANT CHANGE UP (ref 0–0)
NRBC BLD AUTO-RTO: 0 /100 WBCS — SIGNIFICANT CHANGE UP (ref 0–0)
NT-PROBNP SERPL-SCNC: 2359 PG/ML — HIGH (ref 0–300)
PCO2 BLDA: 56 MMHG — HIGH (ref 35–48)
PH BLDA: 7.23 — LOW (ref 7.35–7.45)
PHOSPHATE SERPL-MCNC: 4.7 MG/DL — HIGH (ref 2.5–4.5)
PLAT MORPH BLD: NORMAL — SIGNIFICANT CHANGE UP
PLATELET # BLD AUTO: 102 K/UL — LOW (ref 150–400)
PLATELET # BLD AUTO: 105 K/UL — LOW (ref 150–400)
PLATELET # BLD AUTO: 107 K/UL — LOW (ref 150–400)
PLATELET COUNT - ESTIMATE: ABNORMAL
PO2 BLDA: 116 MMHG — HIGH (ref 83–108)
POTASSIUM SERPL-MCNC: 4.1 MMOL/L — SIGNIFICANT CHANGE UP (ref 3.5–5.3)
POTASSIUM SERPL-MCNC: 4.2 MMOL/L — SIGNIFICANT CHANGE UP (ref 3.5–5.3)
POTASSIUM SERPL-SCNC: 4.1 MMOL/L — SIGNIFICANT CHANGE UP (ref 3.5–5.3)
POTASSIUM SERPL-SCNC: 4.2 MMOL/L — SIGNIFICANT CHANGE UP (ref 3.5–5.3)
PROT SERPL-MCNC: 5.7 G/DL — LOW (ref 6–8.3)
PROTHROM AB SERPL-ACNC: 12.8 SEC — SIGNIFICANT CHANGE UP (ref 9.9–13.4)
PROTHROM AB SERPL-ACNC: 13.8 SEC — HIGH (ref 9.9–13.4)
RBC # BLD: 2.47 M/UL — LOW (ref 4.2–5.8)
RBC # BLD: 2.53 M/UL — LOW (ref 4.2–5.8)
RBC # BLD: 2.87 M/UL — LOW (ref 4.2–5.8)
RBC # FLD: 15 % — HIGH (ref 10.3–14.5)
RBC # FLD: 15.4 % — HIGH (ref 10.3–14.5)
RBC # FLD: 15.6 % — HIGH (ref 10.3–14.5)
RBC BLD AUTO: SIGNIFICANT CHANGE UP
SAO2 % BLDA: 99.3 % — HIGH (ref 94–98)
SMUDGE CELLS # BLD: PRESENT — SIGNIFICANT CHANGE UP
SODIUM SERPL-SCNC: 147 MMOL/L — HIGH (ref 135–145)
SODIUM SERPL-SCNC: 151 MMOL/L — HIGH (ref 135–145)
WBC # BLD: 10.78 K/UL — HIGH (ref 3.8–10.5)
WBC # BLD: 8.76 K/UL — SIGNIFICANT CHANGE UP (ref 3.8–10.5)
WBC # BLD: 9.74 K/UL — SIGNIFICANT CHANGE UP (ref 3.8–10.5)
WBC # FLD AUTO: 10.78 K/UL — HIGH (ref 3.8–10.5)
WBC # FLD AUTO: 8.76 K/UL — SIGNIFICANT CHANGE UP (ref 3.8–10.5)
WBC # FLD AUTO: 9.74 K/UL — SIGNIFICANT CHANGE UP (ref 3.8–10.5)

## 2025-02-23 PROCEDURE — 93308 TTE F-UP OR LMTD: CPT | Mod: 26

## 2025-02-23 PROCEDURE — 71045 X-RAY EXAM CHEST 1 VIEW: CPT | Mod: 26

## 2025-02-23 PROCEDURE — 99291 CRITICAL CARE FIRST HOUR: CPT

## 2025-02-23 PROCEDURE — 99291 CRITICAL CARE FIRST HOUR: CPT | Mod: FS,25

## 2025-02-23 RX ORDER — LORAZEPAM 4 MG/ML
0.5 VIAL (ML) INJECTION ONCE
Refills: 0 | Status: DISCONTINUED | OUTPATIENT
Start: 2025-02-23 | End: 2025-02-23

## 2025-02-23 RX ORDER — NOREPINEPHRINE BITARTRATE 8 MG
0.05 SOLUTION INTRAVENOUS
Qty: 8 | Refills: 0 | Status: DISCONTINUED | OUTPATIENT
Start: 2025-02-23 | End: 2025-02-24

## 2025-02-23 RX ORDER — MIDAZOLAM IN 0.9 % SOD.CHLORID 1 MG/ML
4 PLASTIC BAG, INJECTION (ML) INTRAVENOUS ONCE
Refills: 0 | Status: DISCONTINUED | OUTPATIENT
Start: 2025-02-23 | End: 2025-02-23

## 2025-02-23 RX ORDER — NALOXEGOL OXALATE 12.5 MG/1
12.5 TABLET, FILM COATED ORAL DAILY
Refills: 0 | Status: DISCONTINUED | OUTPATIENT
Start: 2025-02-23 | End: 2025-03-04

## 2025-02-23 RX ORDER — FENTANYL CITRATE-0.9 % NACL/PF 100MCG/2ML
100 SYRINGE (ML) INTRAVENOUS ONCE
Refills: 0 | Status: DISCONTINUED | OUTPATIENT
Start: 2025-02-23 | End: 2025-02-23

## 2025-02-23 RX ORDER — FENTANYL CITRATE-0.9 % NACL/PF 100MCG/2ML
2 SYRINGE (ML) INTRAVENOUS
Qty: 5000 | Refills: 0 | Status: DISCONTINUED | OUTPATIENT
Start: 2025-02-23 | End: 2025-02-24

## 2025-02-23 RX ORDER — FENTANYL CITRATE-0.9 % NACL/PF 100MCG/2ML
0.5 SYRINGE (ML) INTRAVENOUS
Qty: 2500 | Refills: 0 | Status: DISCONTINUED | OUTPATIENT
Start: 2025-02-23 | End: 2025-02-23

## 2025-02-23 RX ORDER — SODIUM CHLORIDE 9 G/1000ML
1000 INJECTION, SOLUTION INTRAVENOUS
Refills: 0 | Status: DISCONTINUED | OUTPATIENT
Start: 2025-02-23 | End: 2025-02-23

## 2025-02-23 RX ORDER — PROPOFOL 10 MG/ML
10 INJECTION, EMULSION INTRAVENOUS
Qty: 1000 | Refills: 0 | Status: DISCONTINUED | OUTPATIENT
Start: 2025-02-23 | End: 2025-02-28

## 2025-02-23 RX ORDER — PROPOFOL 10 MG/ML
30 INJECTION, EMULSION INTRAVENOUS ONCE
Refills: 0 | Status: COMPLETED | OUTPATIENT
Start: 2025-02-23 | End: 2025-02-23

## 2025-02-23 RX ORDER — HEPARIN SODIUM 1000 [USP'U]/ML
7500 INJECTION INTRAVENOUS; SUBCUTANEOUS EVERY 6 HOURS
Refills: 0 | Status: DISCONTINUED | OUTPATIENT
Start: 2025-02-23 | End: 2025-02-24

## 2025-02-23 RX ORDER — HEPARIN SODIUM 1000 [USP'U]/ML
INJECTION INTRAVENOUS; SUBCUTANEOUS
Qty: 25000 | Refills: 0 | Status: DISCONTINUED | OUTPATIENT
Start: 2025-02-23 | End: 2025-03-01

## 2025-02-23 RX ORDER — HEPARIN SODIUM 1000 [USP'U]/ML
3500 INJECTION INTRAVENOUS; SUBCUTANEOUS EVERY 6 HOURS
Refills: 0 | Status: DISCONTINUED | OUTPATIENT
Start: 2025-02-23 | End: 2025-02-24

## 2025-02-23 RX ADMIN — Medication 100 MICROGRAM(S): at 06:25

## 2025-02-23 RX ADMIN — Medication 15 MILLILITER(S): at 18:53

## 2025-02-23 RX ADMIN — LEVETIRACETAM 400 MILLIGRAM(S): 10 INJECTION, SOLUTION INTRAVENOUS at 13:06

## 2025-02-23 RX ADMIN — METOPROLOL SUCCINATE 5 MILLIGRAM(S): 50 TABLET, EXTENDED RELEASE ORAL at 04:30

## 2025-02-23 RX ADMIN — LEVETIRACETAM 400 MILLIGRAM(S): 10 INJECTION, SOLUTION INTRAVENOUS at 21:35

## 2025-02-23 RX ADMIN — IPRATROPIUM BROMIDE AND ALBUTEROL SULFATE 3 MILLILITER(S): .5; 2.5 SOLUTION RESPIRATORY (INHALATION) at 04:30

## 2025-02-23 RX ADMIN — Medication 1000 UNIT(S): at 11:06

## 2025-02-23 RX ADMIN — NALOXEGOL OXALATE 12.5 MILLIGRAM(S): 12.5 TABLET, FILM COATED ORAL at 11:06

## 2025-02-23 RX ADMIN — GABAPENTIN 150 MILLIGRAM(S): 400 CAPSULE ORAL at 17:19

## 2025-02-23 RX ADMIN — POLYETHYLENE GLYCOL 3350 17 GRAM(S): 17 POWDER, FOR SOLUTION ORAL at 11:05

## 2025-02-23 RX ADMIN — Medication 0.5 MILLIGRAM(S): at 04:12

## 2025-02-23 RX ADMIN — HEPARIN SODIUM 1700 UNIT(S)/HR: 1000 INJECTION INTRAVENOUS; SUBCUTANEOUS at 09:47

## 2025-02-23 RX ADMIN — Medication 2 TABLET(S): at 21:35

## 2025-02-23 RX ADMIN — PROPOFOL 5.62 MICROGRAM(S)/KG/MIN: 10 INJECTION, EMULSION INTRAVENOUS at 21:54

## 2025-02-23 RX ADMIN — ACETYLCYSTEINE 3 MILLILITER(S): 200 INHALANT RESPIRATORY (INHALATION) at 23:20

## 2025-02-23 RX ADMIN — IPRATROPIUM BROMIDE AND ALBUTEROL SULFATE 3 MILLILITER(S): .5; 2.5 SOLUTION RESPIRATORY (INHALATION) at 23:21

## 2025-02-23 RX ADMIN — PROPOFOL 5.62 MICROGRAM(S)/KG/MIN: 10 INJECTION, EMULSION INTRAVENOUS at 07:06

## 2025-02-23 RX ADMIN — HEPARIN SODIUM 1500 UNIT(S)/HR: 1000 INJECTION INTRAVENOUS; SUBCUTANEOUS at 23:09

## 2025-02-23 RX ADMIN — Medication 100 MICROGRAM(S): at 08:30

## 2025-02-23 RX ADMIN — ESCITALOPRAM OXALATE 15 MILLIGRAM(S): 20 TABLET ORAL at 09:58

## 2025-02-23 RX ADMIN — IPRATROPIUM BROMIDE AND ALBUTEROL SULFATE 3 MILLILITER(S): .5; 2.5 SOLUTION RESPIRATORY (INHALATION) at 11:31

## 2025-02-23 RX ADMIN — Medication 25 GRAM(S): at 21:35

## 2025-02-23 RX ADMIN — ACETYLCYSTEINE 3 MILLILITER(S): 200 INHALANT RESPIRATORY (INHALATION) at 04:30

## 2025-02-23 RX ADMIN — LACOSAMIDE 140 MILLIGRAM(S): 150 TABLET, FILM COATED ORAL at 05:04

## 2025-02-23 RX ADMIN — Medication 2.34 MICROGRAM(S)/KG/HR: at 07:06

## 2025-02-23 RX ADMIN — Medication 9.36 MICROGRAM(S)/KG/HR: at 19:21

## 2025-02-23 RX ADMIN — Medication 100 MICROGRAM(S): at 06:35

## 2025-02-23 RX ADMIN — LEVETIRACETAM 400 MILLIGRAM(S): 10 INJECTION, SOLUTION INTRAVENOUS at 05:04

## 2025-02-23 RX ADMIN — Medication 25 GRAM(S): at 13:06

## 2025-02-23 RX ADMIN — Medication 4 MILLIGRAM(S): at 06:39

## 2025-02-23 RX ADMIN — ATORVASTATIN CALCIUM 20 MILLIGRAM(S): 80 TABLET, FILM COATED ORAL at 21:35

## 2025-02-23 RX ADMIN — Medication 100 MICROGRAM(S): at 06:40

## 2025-02-23 RX ADMIN — IPRATROPIUM BROMIDE AND ALBUTEROL SULFATE 3 MILLILITER(S): .5; 2.5 SOLUTION RESPIRATORY (INHALATION) at 17:26

## 2025-02-23 RX ADMIN — LURASIDONE HYDROCHLORIDE 40 MILLIGRAM(S): 120 TABLET, FILM COATED ORAL at 21:35

## 2025-02-23 RX ADMIN — HEPARIN SODIUM 1500 UNIT(S)/HR: 1000 INJECTION INTRAVENOUS; SUBCUTANEOUS at 17:20

## 2025-02-23 RX ADMIN — LACOSAMIDE 140 MILLIGRAM(S): 150 TABLET, FILM COATED ORAL at 17:20

## 2025-02-23 RX ADMIN — Medication 4 MILLIGRAM(S): at 06:50

## 2025-02-23 RX ADMIN — NOREPINEPHRINE BITARTRATE 8.78 MICROGRAM(S)/KG/MIN: 8 SOLUTION at 19:21

## 2025-02-23 RX ADMIN — PROPOFOL 5.62 MICROGRAM(S)/KG/MIN: 10 INJECTION, EMULSION INTRAVENOUS at 19:21

## 2025-02-23 RX ADMIN — Medication 100 MICROGRAM(S): at 08:45

## 2025-02-23 RX ADMIN — PROPOFOL 30 MILLIGRAM(S): 10 INJECTION, EMULSION INTRAVENOUS at 06:58

## 2025-02-23 NOTE — PROVIDER CONTACT NOTE (OTHER) - ASSESSMENT
pt is a/o4 able to make needs known. pt c/o SOB. pt on continuous AVAPs with RR in 30s. spO2 97%, HR sinus tachycardia in 120s, /70. lung sounds coarse, pt warm to touch, axillary temp 97.6.

## 2025-02-23 NOTE — PROGRESS NOTE ADULT - SUBJECTIVE AND OBJECTIVE BOX
DATE OF SERVICE: 02-23-25 @ 12:16    Patient is a 69y old  Male who presents with a chief complaint of Acute on chronic hypoxemic respiratory failure (23 Feb 2025 08:34)      INTERVAL HISTORY: RRT for hypoxia, transferred to MICU    REVIEW OF SYSTEMS:  unable to participate      MEDICATIONS:  amLODIPine   Tablet 5 milliGRAM(s) Oral daily  hydrALAZINE 25 milliGRAM(s) Oral three times a day  metoprolol tartrate Injectable 5 milliGRAM(s) IV Push every 6 hours  norepinephrine Infusion 0.05 MICROgram(s)/kG/Min IV Continuous <Continuous>        PHYSICAL EXAM:  T(C): 37.3 (02-23-25 @ 12:00), Max: 37.3 (02-23-25 @ 12:00)  HR: 80 (02-23-25 @ 11:45) (80 - 136)  BP: 88/52 (02-23-25 @ 11:45) (88/52 - 214/109)  RR: 16 (02-23-25 @ 11:45) (16 - 57)  SpO2: 100% (02-23-25 @ 11:45) (63% - 100%)  Wt(kg): --  I&O's Summary    22 Feb 2025 07:01  -  23 Feb 2025 07:00  --------------------------------------------------------  IN: 121 mL / OUT: 1650 mL / NET: -1529 mL    23 Feb 2025 07:01  -  23 Feb 2025 12:16  --------------------------------------------------------  IN: 635.3 mL / OUT: 0 mL / NET: 635.3 mL          Appearance: Intubated, sedated                                9.2    9.74  )-----------( 105      ( 23 Feb 2025 06:14 )             29.8     02-23    151[H]  |  114[H]  |  54[H]  ----------------------------<  124[H]  4.1   |  21[L]  |  2.64[H]    Ca    10.0      23 Feb 2025 06:11  Phos  4.1     02-21  Mg     2.3     02-21    TPro  6.4  /  Alb  2.9[L]  /  TBili  0.4  /  DBili  x   /  AST  50[H]  /  ALT  33  /  AlkPhos  148[H]  02-22        Labs personally reviewed      ASSESSMENT/PLAN: 	    69 year old male with a past medical history of hypertension, hyperlipemia VAZQUEZ (on CPAP at bedtime, NC 2 liters during the day), CKD stage 3, epilepsy, schizophrenia, developmental delay, metastatic testicular cancer (s/p orchiectomy, actively on chemotherapy, last dose of etoposide/cisplatin on 6/2024), presenting with acute on chronic hypoxemic respiratory failure, secondary to (a) COVID-19 infection, (b) superimposed pneumonia after recent influenza infection, and/or (c) fluid overload.     Problem/Plan - 1:  ·  Problem: Acute on chronic respiratory failure with hypoxia.   ·  Plan: Likely 2/2 PNA, does not appear to be in decompensated HF  - Agree with holding diuresis   - Appreciate pulmonology.  - Transferred to MICU for hypoxia. Appreciate MICU care.     Problem/Plan - 2:  ·  Problem: SHAGUFTA (acute kidney injury).   ·  Plan: Has a history of CKD stage 3, Cr 2.38 at baseline. Presents with Cr 3.27. Likely pre-renal state  - Appreciate nephrology.    Problem/Plan - 3:  ·  Problem: Chronic heart failure with preserved ejection fraction.   ·  Plan: TTE done here 1/17/25 technically difficult, but LV systolic fxn appears nl without any regional wall motion abnormalities  - Repeat TTE pending  - BNP 5000 <---1100 but appears euvolemic   - Hold off diuretics     Problem/Plan - 4:  ·  Problem: New onset Afib RVR (on tele, confirmed with EKG 2/19)   ·  Plan: Started metoprolol 5mg IVP q6 hours & Hep gtt  - If rate remains uncontrolled, can start Cardizem drip at 5mg/h  - Initiated hep gtt and will transition to Eliquis     Problem/Plan - 5:  ·  Problem: HTN    ·  Plan: Start Hydral 25mg TID & amlodipine 5mg for uncontrolled BP (2/19)  - improved  - Hold BP meds for shock state. Can resume when improved.     Problem/Plan - 6:  ·  Problem: Prophylactic measure.   ·  Plan:  DVT prophylaxis: heparin gtt        TUAN Cristina DO [facility]  Cardiovascular Medicine  [address] [place] [address], [address]  Office: [phone]  Available via call/text on [employer] Teams

## 2025-02-23 NOTE — PROGRESS NOTE ADULT - NSPROGADDITIONALINFOA_GEN_ALL_CORE
acute respiratory failure with hypoxia on hi-flow for meal breaks <--> bipap  speech & swallow eval appreciated, s/p FEES. now on thicken pureed diet.  events reviewed, RRT for hypoxia, ICU eval appreciated  currently comfortable on bipap though labile respiratory status  GOC discussed, full code per the sister.   critically ill.      RRT for hypoxia, s/p intubation. ICU care appreciated.  d/w pt's sister Ester RASHID (retired RN), clinical status updated in details. All questions answered.    - Dr. SHIRA Leroy ([facility])  - [phone]

## 2025-02-23 NOTE — PROVIDER CONTACT NOTE (OTHER) - ACTION/TREATMENT ORDERED:
STAT IV Tylenol 1g and IVP Reglan 10 mg ordered. IVP ativan 0.5 mg ordered for anxiety- pt not receiving oral lexapro, latuda due to continuous avaps for tachypnea

## 2025-02-23 NOTE — PROGRESS NOTE ADULT - NS ATTEND AMEND GEN_ALL_CORE FT
Acute on chronic respiratory failure with hypoxia.   ·  Plan: Likely 2/2 PNA, does not appear to be in decompensated HF  - Agree with holding diuresis   - Appreciate pulmonology.  - Transferred to MICU for hypoxia. Appreciate MICU care.

## 2025-02-23 NOTE — PROGRESS NOTE ADULT - ATTENDING COMMENTS
1. Acute hypoxemic and hypercapnic respiratory failure due to Covid infection/pneumonia  and likely aspiration pneumonia. On  pt hypercapnic and  not in sync with ventilator. VT increase to 500mls. Plateau 28. Flow increased to 65L/min.  Repeat ABG. Increase Fentanyl drip to 2 after 100mcg IVP.    2.ID. Covid infection: Continue remdesivir. Continue decadron 6mg daily for total 10 days.         Aspiration pneumonia. Continue Zosyn.  3.Cardiac: Atrial fib. 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 Last chemotherapy 6/2024.  5/ Thrombocytopenia: stable  6. Hypernatremia. Start free water via OGT  7. Neuro: H/O seizures continue home AEDS  8. DVT prophylaxis: SCD  9. GOC: Full code

## 2025-02-23 NOTE — PROGRESS NOTE ADULT - SUBJECTIVE AND OBJECTIVE BOX
SUBJECTIVE/ OVERNIGHT EVENTS:  events reviewed.  intubated for airway protection  seen and examined in MICU  intubated, sedated.       --------------------------------------------------------------------------------------------  LABS:                        9.2    9.74  )-----------( 105      ( 23 Feb 2025 06:14 )             29.8     02-23    151[H]  |  114[H]  |  54[H]  ----------------------------<  124[H]  4.1   |  21[L]  |  2.64[H]    Ca    10.0      23 Feb 2025 06:11  Phos  4.1     02-21  Mg     2.3     02-21    TPro  6.4  /  Alb  2.9[L]  /  TBili  0.4  /  DBili  x   /  AST  50[H]  /  ALT  33  /  AlkPhos  148[H]  02-22    PT/INR - ( 23 Feb 2025 06:14 )   PT: 12.8 sec;   INR: 1.12 ratio         PTT - ( 23 Feb 2025 06:14 )  PTT:52.8 sec  CAPILLARY BLOOD GLUCOSE      POCT Blood Glucose.: 148 mg/dL (23 Feb 2025 05:41)  POCT Blood Glucose.: 126 mg/dL (22 Feb 2025 12:29)        Urinalysis Basic - ( 23 Feb 2025 06:11 )    Color: x / Appearance: x / SG: x / pH: x  Gluc: 124 mg/dL / Ketone: x  / Bili: x / Urobili: x   Blood: x / Protein: x / Nitrite: x   Leuk Esterase: x / RBC: x / WBC x   Sq Epi: x / Non Sq Epi: x / Bacteria: x        RADIOLOGY & ADDITIONAL TESTS:    Imaging Personally Reviewed:  [x] YES  [ ] NO    Consultant(s) Notes Reviewed:  [x] YES  [ ] NO    MEDICATIONS  (STANDING):  acetylcysteine 20%  Inhalation 3 milliLiter(s) Inhalation every 6 hours  albuterol/ipratropium for Nebulization 3 milliLiter(s) Nebulizer every 6 hours  amLODIPine   Tablet 5 milliGRAM(s) Oral daily  atorvastatin 20 milliGRAM(s) Oral at bedtime  chlorhexidine 0.12% Liquid 15 milliLiter(s) Oral Mucosa every 12 hours  cholecalciferol 1000 Unit(s) Oral daily  dexAMETHasone  Injectable 6 milliGRAM(s) IV Push daily  escitalopram 15 milliGRAM(s) Oral with breakfast  fentaNYL   Infusion.. 2 MICROgram(s)/kG/Hr (9.36 mL/Hr) IV Continuous <Continuous>  gabapentin Solution 150 milliGRAM(s) Oral every 12 hours  heparin  Infusion.  Unit(s)/Hr (17 mL/Hr) IV Continuous <Continuous>  hydrALAZINE 25 milliGRAM(s) Oral three times a day  influenza  Vaccine (HIGH DOSE) 0.5 milliLiter(s) IntraMuscular once  lacosamide IVPB 200 milliGRAM(s) IV Intermittent every 12 hours  levETIRAcetam  IVPB 1000 milliGRAM(s) IV Intermittent <User Schedule>  lurasidone 40 milliGRAM(s) Oral at bedtime  metoprolol tartrate Injectable 5 milliGRAM(s) IV Push every 6 hours  naloxegol 12.5 milliGRAM(s) Oral daily  norepinephrine Infusion 0.05 MICROgram(s)/kG/Min (8.78 mL/Hr) IV Continuous <Continuous>  pantoprazole    Tablet 40 milliGRAM(s) Oral before breakfast  piperacillin/tazobactam IVPB.. 3.375 Gram(s) IV Intermittent every 8 hours  polyethylene glycol 3350 17 Gram(s) Oral daily  propofol Infusion 10 MICROgram(s)/kG/Min (5.62 mL/Hr) IV Continuous <Continuous>  senna 2 Tablet(s) Oral at bedtime    MEDICATIONS  (PRN):  acetaminophen     Tablet .. 650 milliGRAM(s) Oral every 6 hours PRN Temp greater or equal to 38C (100.4F), Mild Pain (1 - 3)  aluminum hydroxide/magnesium hydroxide/simethicone Suspension 30 milliLiter(s) Oral every 4 hours PRN Dyspepsia  heparin   Injectable 7500 Unit(s) IV Push every 6 hours PRN For aPTT less than 40  heparin   Injectable 3500 Unit(s) IV Push every 6 hours PRN For aPTT between 40 - 57  heparin   Injectable 7500 Unit(s) IV Push every 6 hours PRN For aPTT less than 40  heparin   Injectable 3500 Unit(s) IV Push every 6 hours PRN For aPTT between 40 - 57  melatonin 3 milliGRAM(s) Oral at bedtime PRN Insomnia  ondansetron Injectable 4 milliGRAM(s) IV Push every 8 hours PRN Nausea and/or Vomiting      Care Discussed with Consultants/Other Providers [x] YES  [ ] NO    Vital Signs Last 24 Hrs  T(C): 36.7 (23 Feb 2025 08:00), Max: 37 (22 Feb 2025 19:46)  T(F): 98 (23 Feb 2025 08:00), Max: 98.6 (22 Feb 2025 19:46)  HR: 104 (23 Feb 2025 08:45) (84 - 136)  BP: 106/56 (23 Feb 2025 08:45) (106/56 - 214/109)  BP(mean): 75 (23 Feb 2025 08:45) (75 - 151)  RR: 27 (23 Feb 2025 08:45) (20 - 57)  SpO2: 98% (23 Feb 2025 08:45) (63% - 100%)    Parameters below as of 23 Feb 2025 08:00  Patient On (Oxygen Delivery Method): ventilator    O2 Concentration (%): 50  I&O's Summary    22 Feb 2025 07:01  -  23 Feb 2025 07:00  --------------------------------------------------------  IN: 121 mL / OUT: 1650 mL / NET: -1529 mL      PHYSICAL EXAM:  GENERAL: intubated, sedated  HEAD:  Atraumatic, Normocephalic  EYES: EOMI, PERRLA, conjunctiva and sclera clear  NECK: Supple, No JVD  CHEST/LUNG: mild decrease breath sounds bilaterally; No wheeze   HEART: Regular rate and rhythm; No murmurs, rubs, or gallops  ABDOMEN: Soft, Nontender, Nondistended; Bowel sounds present  Neuro:  intubated, sedated  EXTREMITIES:  2+ Peripheral Pulses, No clubbing, cyanosis, or edema  SKIN: No rashes or lesions

## 2025-02-23 NOTE — PROGRESS NOTE ADULT - PROBLEM SELECTOR PLAN 7
Continue with home atorvastatin 20 mg bedtime for HLD, vitamin D supplementation, pantoprazole 40 mg daily, senna 2 tab bedtime, and Miralax 17 gram daily.     General  - DVT prophylaxis: heparin 5000 units q8h --> hep gtt per ICU  - Diet: regular   - Medication reconciliation: complete   - Code: Full   - Medications: Tylenol 650 mg q6h as needed for pain, Maalox 30 mL q4h as needed for acid reflux, melatonin 3 mg bedtime as needed for insomnia, Zofran 4 mg IV q8h as needed for nausea/vomiting        2/16/25 --> plan was discussed with patient's brother Fernando (over the phone, [phone]) in detail. He agrees with plan. All questions answered. He asked that I update Ester (sister, [phone]); she was called and updated as well, also agrees with the plan, also answered all questions.

## 2025-02-23 NOTE — PROGRESS NOTE ADULT - ASSESSMENT
69 year old male with a past medical history of hypertension, hyperlipemia VAZQUEZ (on CPAP at bedtime, NC 2 liters during the day), CKD stage 3, epilepsy, schizophrenia, developmental delay, metastatic testicular cancer (s/p orchiectomy, actively on chemotherapy, last dose of etoposide/cisplatin on 6/2024) presenting with worsening shortness of breath. Patient was recently hospitalized at [facility] from January 27th 2025 to February 6th 2025 for seizure and influenza virus infection, which required intubation. He was sent to rehab (originally from home) from hospital. He returns due to sudden onset shortness of breath and worsening oxygenation. Of note, he tested positive for COVID-19       1- SHAGUFTA on CKDIII  2- covid-19  3- anemia  4- hypotension   5- respiratory failure       SHAGUFTA suspected in setting of new infection. covid 19   cr is now in steady range  hypernatremia start free water   decadron 6 mg iv daily   suspect superimposed pna tx with zosyn 3.375 g iv tid   levophed drip   anemia, trend hgb  d.w icu team when seen earlier

## 2025-02-23 NOTE — PROGRESS NOTE ADULT - PROBLEM SELECTOR PLAN 3
Has a history of CKD stage 3, Cr 2.38 at baseline. Presents with Cr 3.27. Likely pre-renal.   - Renal US no hydronephrosis   - Fe Urea = 29%  - Monitor Cr daily   - Avoid nephrotoxins, dose medication to GFR  - Appreciate nephrology

## 2025-02-23 NOTE — PROGRESS NOTE ADULT - ASSESSMENT
Mr. Gr is a 69 year old male with PMHx of seizure disorder, sleep apnea, HTN, chronic idiopathic constipation, stage III CKD, severe obesity, secondary hyperparathyroidism, anemia, thrombocytopenia, hyperlipidemia, testicular cancer under treatment with Dr. Ovalles who is admitted for acute hypoxic respiratory failure secondary to COVID vs superimposed pneumonia with new onset thrombocytopenia. He was recently discharged 02/06/2025 after a tenous stay including intubation in the ICU for respiratory failure in setting of seizure. He had recovered and was discharged to rehab.      Former smoker, quit 14y prior, denied ETOH, drug use. Lives at home. Does not have children. Denies family history of blood disorders or malignancy.  Denies previous workplace related exposures.  Denies previous history of blood transfusions.  Denied history of thromboses.  Denied history of  requiring anticoagulation.     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  Margins were negative.  pT3Nx. CT C/A/P with few left para-aortic and left iliac chain lymph nodes largest measuring 8.1 cm left para-aortic node, bilateral subcentimeter noncalcified pulmonary nodules measuring up to 7 mm. AFP, LDH, hCG were all elevated. 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 Started on adjuvant therapy with Cisplatin+Etoposide, so far completed 4 cycles of treatment with cisplatin dose reduced 50% and Etoposide 50% due to thrombocytopenia.      02/19/2025: on HFNC, noted tachycardia and fever, Klebsiella in urine as well, thrombocytopenia stable/improving, no signs of active bleeding     02/20/2025: developed A.fib with RVR and was placed on heparin drip and pending cardiology eval    02/21/2025: awake, on AVAPS overnight, noted RRT for hypoxia as pt removed HFNC at some point in time, good response thereafter     02/22/2025:  continues on AVAPS this morning, noted RRT overnight for hypoxia, hypercapnia, ICU following, US LE negative for DVT, counts overall stable/improved       #Acute hypoxic respiratory failure  # COVID  - CT noted with bilateral GGO  - ID following  - Pulmonary following  - Antibiotics per primary team and ID     # Thrombocytopenia   - Did occur during most recent admission and improved to normal prior to discharge   - Without signs of bleeding  - Could be multifactorial, possibly due to infection   - Continue to trend  - Transfuse to maintain Plt > 10k unless actively bleeding then maintain > 50k     # Brain lesion  - pt with hx of seizures  - MRI brain now with 5.4 mm enhancing lesion in the LEFT middle cerebellar peduncle with associated edema suspicious for metastases  - MRI Lumbar negative for acute disease  - Small lesion without mass effect or edema, recommended to correlate per neurology if foci and locus are concordant with seizure activity  - Neurology recs noted, new lesion not likely to cause seizure  - It is rare, but nonetheless not impossible, for testicular cancer to be metastatic to the brain  - He will need another PET-CT, can be completed outpatient once stable if concern can proceed with biopsy at that time   - Previous endocrinology eval for thyroid nodule noted  - AFP/[facility] normal,  previously      # 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 Ovalles, agree with current plan  - Follow up as outpatient with Franki Ovalles MD     # Acute kidney injury on CKD Stage IIIA  - Likely multifactorial  - Nephrology consult and recs noted  - Continue to trend     # Normocytic anemia  - Chronic, has hx of PRBC during chemo 07/2024  - Noted Anti E, Anti-K Anti-C antibodies previously  - Monitor for bleeding  - Recommend to transfuse to maintain Hb > 7    # Klebsiella UTI  -Antibiotics per ID and primary team       Recommendations  - Trend with CBC daily   - Transfuse to maintain Hb > 7   - Transfuse to maintain Plt >10k unless active bleeding then >50k   - Monitor renal function  - Antibiotics per primary team and ID   - Cardiology follow up to address anticoagulation, currently on heparin drip   - f/u ICU recs   - Outpatient follow up with Dr. Franki Ovalles         Thank you for allowing me to participate in the care of Mr. Gr please do not hesitate to call or text me if you have further questions or concerns.     Brayan Mart MD  [facility]   Division of Hematology/Oncology  [address] [place] [address], [address]  [place] [address]  P: [phone]  F: [phone]    Attestation:    ----Pt evaluated including face-to-face interaction in addition to chart review, reviewing treatment plan, and managing the patient’s chronic diagnoses as listed in the assessment----        Mr. Gr is a 69 year old male with PMHx of seizure disorder, sleep apnea, HTN, chronic idiopathic constipation, stage III CKD, severe obesity, secondary hyperparathyroidism, anemia, thrombocytopenia, hyperlipidemia, testicular cancer under treatment with Dr. Ovalles who is admitted for acute hypoxic respiratory failure secondary to COVID vs superimposed pneumonia with new onset thrombocytopenia. He was recently discharged 02/06/2025 after a tenous stay including intubation in the ICU for respiratory failure in setting of seizure. He had recovered and was discharged to rehab.      Former smoker, quit 14y prior, denied ETOH, drug use. Lives at home. Does not have children. Denies family history of blood disorders or malignancy.  Denies previous workplace related exposures.  Denies previous history of blood transfusions.  Denied history of thromboses.  Denied history of  requiring anticoagulation.     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  Margins were negative.  pT3Nx. CT C/A/P with few left para-aortic and left iliac chain lymph nodes largest measuring 8.1 cm left para-aortic node, bilateral subcentimeter noncalcified pulmonary nodules measuring up to 7 mm. AFP, LDH, hCG were all elevated. 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 Started on adjuvant therapy with Cisplatin+Etoposide, so far completed 4 cycles of treatment with cisplatin dose reduced 50% and Etoposide 50% due to thrombocytopenia.      02/19/2025: on HFNC, noted tachycardia and fever, Klebsiella in urine as well, thrombocytopenia stable/improving, no signs of active bleeding     02/20/2025: developed A.fib with RVR and was placed on heparin drip and pending cardiology eval    02/21/2025: awake, on AVAPS overnight, noted RRT for hypoxia as pt removed HFNC at some point in time, good response thereafter     02/22/2025:  continues on AVAPS this morning, noted RRT overnight for hypoxia, hypercapnia, ICU following, US LE negative for DVT, counts overall stable/improved     02/23/2025: progressive respiratory failure, noted ongoing RTT this morning with pending intubation and transfer to MICU       #Acute hypoxic respiratory failure  # COVID  - CT noted with bilateral GGO  - ID following  - Pulmonary following  - Antibiotics per primary team and ID   - Intubated 02/23/2025 AM, MICU thereafter     # Thrombocytopenia   - Did occur during most recent admission and improved to normal prior to discharge   - Without signs of bleeding  - Could be multifactorial, possibly due to infection   - Continue to trend  - Transfuse to maintain Plt > 10k unless actively bleeding then maintain > 50k     # Brain lesion  - pt with hx of seizures  - MRI brain now with 5.4 mm enhancing lesion in the LEFT middle cerebellar peduncle with associated edema suspicious for metastases  - MRI Lumbar negative for acute disease  - Small lesion without mass effect or edema, recommended to correlate per neurology if foci and locus are concordant with seizure activity  - Neurology recs noted, new lesion not likely to cause seizure  - It is rare, but nonetheless not impossible, for testicular cancer to be metastatic to the brain  - He will need another PET-CT, can be completed outpatient once stable if concern can proceed with biopsy at that time   - Previous endocrinology eval for thyroid nodule noted  - AFP/[facility] normal,  previously      # 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 Ovalles, agree with current plan  - Follow up as outpatient with Franki Ovalles MD     # Acute kidney injury on CKD Stage IIIA  - Likely multifactorial  - Nephrology consult and recs noted  - Continue to trend     # Normocytic anemia  - Chronic, has hx of PRBC during chemo 07/2024  - Noted Anti E, Anti-K Anti-C antibodies previously  - Monitor for bleeding  - Recommend to transfuse to maintain Hb > 7    # Klebsiella UTI  -Antibiotics per ID and primary team       Recommendations  - Trend with CBC daily   - Transfuse to maintain Hb > 7   - Transfuse to maintain Plt >10k unless active bleeding then >50k   - Monitor renal function  - Antibiotics per primary team/MICU and ID   - Cardiology follow up to address anticoagulation, currently on heparin drip   - f/u ICU recs         Thank you for allowing me to participate in the care of Mr. Gr please do not hesitate to call or text me if you have further questions or concerns.     Brayan Mart MD  [facility]   Division of Hematology/Oncology  [facility] [facility], [address]  [place] [address]  P: [phone]  F: [phone]    Attestation:    ----Pt evaluated including face-to-face interaction in addition to chart review, reviewing treatment plan, and managing the patient’s chronic diagnoses as listed in the assessment----

## 2025-02-23 NOTE — CHART NOTE - NSCHARTNOTEFT_GEN_A_CORE
MICU Accept Note    HPI:  69 year old male with a past medical history of hypertension, hyperlipemia VAZQUEZ (on CPAP at bedtime, NC 2 liters during the day), CKD stage 3, epilepsy, schizophrenia, developmental delay, metastatic testicular cancer (s/p orchiectomy, actively on chemotherapy, last dose of etoposide/cisplatin on 6/2024) presenting with worsening shortness of breath.     Patient was recently hospitalized at [facility] from January 27th 2025 to February 6th 2025 for seizure and influenza virus infection, which required intubation. He was sent to rehab (originally from home) from hospital.     He returns due to sudden onset shortness of breath and worsening oxygenation. Of note, he tested positive for COVID-19 at facility on 2/13/25 and was not put on any COVID-19 treatment at the time, only guaifenesin and scopolamine patch. Patient was placed on non-rebreather and sent to the hospital on 2/16/25.     In the ER, vital signs significant for T-max 101.9F, HR 84-90. WBC 6.8. Hemoglobin 9.1. Platelet 87. AST 70. ALT 48. Cr 3.27. BUN 45. pH 7.30, pCO2 48. UA negative for infection. Patient given vancomycin, Zosyn, albuterol, and  cc bolus. Patient admitted to the general medicine service for further care.     Patient evaluated at bedside during admission. Unable to talk with the patient given that he is on BIPAP. Attempted to take the patient off BIPAP while in the room, but patient's work of breathing immediately began to increase and patient endorsed shortness of breath, so he was placed back on. History taken by chart review.  (16 Feb 2025 13:16)      INTERVAL HISTORY:    PAST MEDICAL & SURGICAL HISTORY:  Hypertension, unspecified type      Other hyperlipidemia      History of epilepsy      Paranoid schizophrenia      Obstructive sleep apnea      Testicular cancer      H/O Spinal surgery          FAMILY HISTORY:      Social History:      HOME MEDICATIONS:  Home Medications:  atorvastatin 20 mg oral tablet: 1 tab(s) orally once a day (at bedtime) (16 Feb 2025 11:59)  cholecalciferol 25 mcg (1000 intl units) oral tablet: 1 tab(s) orally once a day (16 Feb 2025 11:59)  escitalopram 5 mg oral tablet: 3 tab(s) orally once a day (16 Feb 2025 11:59)  gabapentin 250 mg/5 mL oral solution: 3 milliliter(s) orally 2 times a day (16 Feb 2025 11:59)  ipratropium-albuterol 0.5 mg-2.5 mg/3 mL inhalation solution: 3 milliliter(s) inhaled every 6 hours (16 Feb 2025 11:59)  lacosamide 100 mg oral tablet: 1 tab(s) orally 2 times a day (16 Feb 2025 11:59)  lamoTRIgine 100 mg oral tablet: 1 tab(s) orally 2 times a day (16 Feb 2025 11:59)  levETIRAcetam 750 mg oral tablet: 1 tab(s) orally 2 times a day (16 Feb 2025 11:59)  lidocaine 4% topical film: Apply topically to affected area once a day (16 Feb 2025 11:59)  lurasidone 40 mg oral tablet: 1 tab(s) orally once a day (16 Feb 2025 11:59)  nystatin 100,000 units/g topical powder: 1 Apply topically to affected area 3 times a day As needed fungal infection you may see and want to treat (16 Feb 2025 11:59)  pantoprazole 40 mg oral delayed release tablet: 1 tab(s) orally once a day (before a meal) (16 Feb 2025 11:59)  polyethylene glycol 3350 oral powder for reconstitution: 17 gram(s) orally once a day (16 Feb 2025 11:59)  senna leaf extract oral tablet: 2 tab(s) orally once a day (at bedtime) (16 Feb 2025 11:59)      Allergies    seasonal allergies (Unknown)  penicillin (Hives)    Intolerances    latex (Rash)      REVIEW OF SYSTEMS:  Constitutional: No fevers, chills, weight loss, weight gain  HEENT: No vision problems, eye pain, nasal congestion, rhinorrhea, sore throat, dysphagia  CV: No chest pain, orthopnea, palpitations  Resp: No cough, dyspnea, wheezing, hemoptysis  GI: No nausea, vomiting, diarrhea, constipation, abdominal pain  : [ ] dysuria [ ] nocturia [ ] hematuria [ ] increased urinary frequency  Musculoskeletal: [ ] back pain [ ] myalgias [ ] arthralgias [ ] fracture  Skin: [ ] rash [ ] itch  Neurological: [ ] headache [ ] dizziness [ ] syncope [ ] weakness [ ] numbness  Psychiatric: [ ] anxiety [ ] depression  Endocrine: [ ] diabetes [ ] thyroid problem  Hematologic/Lymphatic: [ ] anemia [ ] bleeding problem  Allergic/Immunologic: [ ] itchy eyes [ ] nasal discharge [ ] hives [ ] angioedema  [ ] All other systems negative  [X] Unable to assess ROS because patient is intubated    OBJECTIVE:  ICU Vital Signs Last 24 Hrs  T(C): 36.4 (23 Feb 2025 03:20), Max: 37 (22 Feb 2025 19:46)  T(F): 97.6 (23 Feb 2025 03:20), Max: 98.6 (22 Feb 2025 19:46)  HR: 112 (23 Feb 2025 05:09) (75 - 136)  BP: 135/82 (23 Feb 2025 04:21) (112/72 - 175/100)  BP(mean): --  ABP: --  ABP(mean): --  RR: 39 (23 Feb 2025 05:09) (20 - 39)  SpO2: 97% (23 Feb 2025 05:09) (95% - 99%)    O2 Parameters below as of 23 Feb 2025 05:09  Patient On (Oxygen Delivery Method): BiPAP/CPAP        02-21 @ 07:01 - 02-22 @ 07:00  --------------------------------------------------------  IN: 350 mL / OUT: 1550 mL / NET: -1200 mL    02-22 @ 07:01 - 02-23 @ 05:58  --------------------------------------------------------  IN: 121 mL / OUT: 1650 mL / NET: -1529 mL      CAPILLARY BLOOD GLUCOSE      POCT Blood Glucose.: 148 mg/dL (23 Feb 2025 05:41)      PHYSICAL EXAM:  GENERAL: NAD, well-developed  HEAD:  Atraumatic, Normocephalic  EYES: EOMI, PERRLA, conjunctiva and sclera clear  NECK: Supple, No JVD  CHEST/LUNG: Clear to auscultation bilaterally; No wheeze  HEART: Regular rate and rhythm; No murmurs, rubs, or gallops  ABDOMEN: Soft, Nontender, Nondistended; Bowel sounds present  EXTREMITIES:  2+ Peripheral Pulses, No clubbing, cyanosis, or edema  PSYCH: AAOx3, appropriate affect  NEUROLOGY: non-focal, lehman  SKIN: No rashes or lesions      HOSPITAL MEDICATIONS:  MEDICATIONS  (STANDING):  acetylcysteine 20%  Inhalation 3 milliLiter(s) Inhalation every 6 hours  albuterol/ipratropium for Nebulization 3 milliLiter(s) Nebulizer every 6 hours  amLODIPine   Tablet 5 milliGRAM(s) Oral daily  atorvastatin 20 milliGRAM(s) Oral at bedtime  cholecalciferol 1000 Unit(s) Oral daily  dexAMETHasone  Injectable 6 milliGRAM(s) IV Push daily  escitalopram 15 milliGRAM(s) Oral with breakfast  gabapentin Solution 150 milliGRAM(s) Oral every 12 hours  heparin  Infusion.  Unit(s)/Hr (17 mL/Hr) IV Continuous <Continuous>  hydrALAZINE 25 milliGRAM(s) Oral three times a day  influenza  Vaccine (HIGH DOSE) 0.5 milliLiter(s) IntraMuscular once  lacosamide IVPB 200 milliGRAM(s) IV Intermittent every 12 hours  levETIRAcetam  IVPB 1000 milliGRAM(s) IV Intermittent <User Schedule>  lurasidone 40 milliGRAM(s) Oral at bedtime  metoprolol tartrate Injectable 5 milliGRAM(s) IV Push every 6 hours  pantoprazole    Tablet 40 milliGRAM(s) Oral before breakfast  piperacillin/tazobactam IVPB.. 3.375 Gram(s) IV Intermittent every 8 hours  polyethylene glycol 3350 17 Gram(s) Oral daily  propofol Infusion 10 MICROgram(s)/kG/Min (5.62 mL/Hr) IV Continuous <Continuous>  senna 2 Tablet(s) Oral at bedtime    MEDICATIONS  (PRN):  acetaminophen     Tablet .. 650 milliGRAM(s) Oral every 6 hours PRN Temp greater or equal to 38C (100.4F), Mild Pain (1 - 3)  aluminum hydroxide/magnesium hydroxide/simethicone Suspension 30 milliLiter(s) Oral every 4 hours PRN Dyspepsia  heparin   Injectable 7500 Unit(s) IV Push every 6 hours PRN For aPTT less than 40  heparin   Injectable 3500 Unit(s) IV Push every 6 hours PRN For aPTT between 40 - 57  melatonin 3 milliGRAM(s) Oral at bedtime PRN Insomnia  ondansetron Injectable 4 milliGRAM(s) IV Push every 8 hours PRN Nausea and/or Vomiting      LABS:                        8.6    8.59  )-----------( 106      ( 22 Feb 2025 06:08 )             28.3     02-22    149[H]  |  114[H]  |  54[H]  ----------------------------<  109[H]  4.1   |  21[L]  |  2.47[H]    Ca    9.8      22 Feb 2025 06:08  Phos  4.1     02-21  Mg     2.3     02-21    TPro  6.4  /  Alb  2.9[L]  /  TBili  0.4  /  DBili  x   /  AST  50[H]  /  ALT  33  /  AlkPhos  148[H]  02-22    PT/INR - ( 22 Feb 2025 06:08 )   PT: 12.1 sec;   INR: 1.05 ratio         PTT - ( 22 Feb 2025 19:06 )  PTT:58.8 sec  ABG - ( 22 Feb 2025 06:05 )  pH, Arterial: 7.30  pH, Blood: x     /  pCO2: 46    /  pO2: 132   / HCO3: 23    / Base Excess: -4.0  /  SaO2: 99.9          Urinalysis Basic - ( 22 Feb 2025 06:08 )    Color: x / Appearance: x / SG: x / pH: x  Gluc: 109 mg/dL / Ketone: x  / Bili: x / Urobili: x   Blood: x / Protein: x / Nitrite: x   Leuk Esterase: x / RBC: x / WBC x   Sq Epi: x / Non Sq Epi: x / Bacteria: x    CAPILLARY BLOOD GLUCOSE      POCT Blood Glucose.: 148 mg/dL (23 Feb 2025 05:41)  POCT Blood Glucose.: 126 mg/dL (22 Feb 2025 12:29)      69 year old male with a past medical history of hypertension, hyperlipemia VAZQUEZ (on CPAP at bedtime, NC 2 liters during the day), CKD stage 3, epilepsy, schizophrenia, developmental delay, metastatic testicular cancer (s/p orchiectomy, actively on chemotherapy, last dose of etoposide/cisplatin on 6/2024), presenting with acute on chronic hypoxemic respiratory failure, secondary to (a) COVID-19 infection, (b) superimposed pneumonia after recent influenza infection, and/or (c) fluid overload.     MICU consulted and accepted after RRT called earlier this AM due to increased work of breathing    NEURO:  #Mental status:  - Sedated, however, was AAOx2 prior to increased work of breathing that required intubation    #Epilepsy:  - Continue with home meds which include Lacosamide, Lurasidone, Lamotrigine    #Brain Mets:  - MRI brain now with 5.4 mm enhancing lesion in the LEFT middle cerebellar peduncle with associated edema suspicious for metastases      CV:  #Hemodynamically stable  - Patients BP holding currently, however, is tachycardic and tachypneic   - Tachycardia most likely 2/2 inc work of breathing and underlying infection and Afib    #Echo:  Recent echo last admission showing Left ventricular cavity is normal in size. Left ventricular systolic function is normal with an ejection fraction of 62 %. There are no regional wall motion abnormalities seen. Normal right ventricular cavity size and normal right ventricular systolic function.     - Repeat TTE pending     #Afib w/RVR  - New onset Afib RVR (on tele, confirmed with EKG 2/19)   - Plan: Started metoprolol 5mg IVP q6 hours & Hep gtt  - If rate remains uncontrolled, can start Cardizem drip at 5mg/h  - On Heparin drip, however, will hold    #HTN:  - On Hydralazine 25mg TID, Amlodipine 5mg    PULM:  #Vent   - Due to increased work of breathing, now intubated    #AHRF  - Patient admitted to the hospital for AHRF, found to be COVID (+)  - Likely 2/2 PNA, does not appear to CHF decompensation  - Diuretics currently being held  - Continue with airway clearance regimen which includes duoneb, mucomyst    RENAL:  #SHAGUFTA:   - Hx of CKD stage 3, Cr 2.38 at baseline, presented with Cr 3.27, although now near baseline  - In setting of COVID (+)  - Pre-renal, FeUrea 29%  - Monitor Cr  - Avoid nephrotoxic meds  - Wakefield in place      #Electrolyte abnormalities  Hypernatremia  - Continue to monitor  - If uptrending can consider D5W  - Free water deficit 1.4  - F/u urine studies  - Nephro recs appreciated    GI:  No acute issues     #Diet:  - Currently NPO  - OG tube to be placed    #Bowel Regimen  - On Senna and Miralax    ENDO:  No acute issues      HEMATOLOGIC:  #Thrombocytopenic  - Unclear origin  - Concern for possible mets to bone marrow? vs due to infection  - Continue to monitor  - Transfuse to maintain Plt>10K unless actively bleeding then maintain >50K  - Heme-Onc recs appreciated      #DVT prophylaxis   - SCD    ID:  #COVID-19  - Concern for possible superimposed PNA   - Continue Zosyn   - S/p Remdesivir  - Blood cx from 2 days ago negative      SKIN:  #Lines:  2x PIV    Ethics:  - Full Code  - Contact: Sister Ester [phone] MICU Accept Note    HPI:  69 year old male with a past medical history of hypertension, hyperlipemia VAZQUEZ (on CPAP at bedtime, NC 2 liters during the day), CKD stage 3, epilepsy, schizophrenia, developmental delay, metastatic testicular cancer (s/p orchiectomy, actively on chemotherapy, last dose of etoposide/cisplatin on 6/2024) presenting with worsening shortness of breath.     Patient was recently hospitalized at [facility] from January 27th 2025 to February 6th 2025 for seizure and influenza virus infection, which required intubation. He was sent to rehab (originally from home) from hospital.     He returns due to sudden onset shortness of breath and worsening oxygenation. Of note, he tested positive for COVID-19 at facility on 2/13/25 and was not put on any COVID-19 treatment at the time, only guaifenesin and scopolamine patch. Patient was placed on non-rebreather and sent to the hospital on 2/16/25.     In the ER, vital signs significant for T-max 101.9F, HR 84-90. WBC 6.8. Hemoglobin 9.1. Platelet 87. AST 70. ALT 48. Cr 3.27. BUN 45. pH 7.30, pCO2 48. UA negative for infection. Patient given vancomycin, Zosyn, albuterol, and  cc bolus. Patient admitted to the general medicine service for further care.     He was transitioned from HFNC to AVAPS on 02/21 after an episode of hypoxia requiring RRT and by next day his respiratory status had improved with continued AVAPS. Repeat RRT on 2/23 for hypoxia and tachypnea to 50 resulted in intubation. Intubation was complicated by possibly popped baloon requiring reintubation while in MICU.         INTERVAL HISTORY:    PAST MEDICAL & SURGICAL HISTORY:  Hypertension, unspecified type  Other hyperlipidemia  History of epilepsy  Paranoid schizophrenia  Obstructive sleep apnea  Testicular cancer  H/O Spinal surgery      HOME MEDICATIONS:  Home Medications:  atorvastatin 20 mg oral tablet: 1 tab(s) orally once a day (at bedtime) (16 Feb 2025 11:59)  cholecalciferol 25 mcg (1000 intl units) oral tablet: 1 tab(s) orally once a day (16 Feb 2025 11:59)  escitalopram 5 mg oral tablet: 3 tab(s) orally once a day (16 Feb 2025 11:59)  gabapentin 250 mg/5 mL oral solution: 3 milliliter(s) orally 2 times a day (16 Feb 2025 11:59)  ipratropium-albuterol 0.5 mg-2.5 mg/3 mL inhalation solution: 3 milliliter(s) inhaled every 6 hours (16 Feb 2025 11:59)  lacosamide 100 mg oral tablet: 1 tab(s) orally 2 times a day (16 Feb 2025 11:59)  lamoTRIgine 100 mg oral tablet: 1 tab(s) orally 2 times a day (16 Feb 2025 11:59)  levETIRAcetam 750 mg oral tablet: 1 tab(s) orally 2 times a day (16 Feb 2025 11:59)  lidocaine 4% topical film: Apply topically to affected area once a day (16 Feb 2025 11:59)  lurasidone 40 mg oral tablet: 1 tab(s) orally once a day (16 Feb 2025 11:59)  nystatin 100,000 units/g topical powder: 1 Apply topically to affected area 3 times a day As needed fungal infection you may see and want to treat (16 Feb 2025 11:59)  pantoprazole 40 mg oral delayed release tablet: 1 tab(s) orally once a day (before a meal) (16 Feb 2025 11:59)  polyethylene glycol 3350 oral powder for reconstitution: 17 gram(s) orally once a day (16 Feb 2025 11:59)  senna leaf extract oral tablet: 2 tab(s) orally once a day (at bedtime) (16 Feb 2025 11:59)      Allergies  seasonal allergies (Unknown)  penicillin (Hives)      REVIEW OF SYSTEMS:  [X] Unable to assess ROS because patient is intubated    OBJECTIVE:  ICU Vital Signs Last 24 Hrs  T(C): 36.4 (23 Feb 2025 03:20), Max: 37 (22 Feb 2025 19:46)  T(F): 97.6 (23 Feb 2025 03:20), Max: 98.6 (22 Feb 2025 19:46)  HR: 112 (23 Feb 2025 05:09) (75 - 136)  BP: 135/82 (23 Feb 2025 04:21) (112/72 - 175/100)  RR: 39 (23 Feb 2025 05:09) (20 - 39)  SpO2: 97% (23 Feb 2025 05:09) (95% - 99%)    O2 Parameters below as of 23 Feb 2025 05:09  Patient On (Oxygen Delivery Method): BiPAP/CPAP        02-21 @ 07:01  -  02-22 @ 07:00  --------------------------------------------------------  IN: 350 mL / OUT: 1550 mL / NET: -1200 mL    02-22 @ 07:01  - 02-23 @ 05:58  --------------------------------------------------------  IN: 121 mL / OUT: 1650 mL / NET: -1529 mL      CAPILLARY BLOOD GLUCOSE      POCT Blood Glucose.: 148 mg/dL (23 Feb 2025 05:41)    PHYSICAL EXAM:   GENERAL: Intubated sedated   HEENT: NCAT  NECK: supple without JVD  CHEST/LUNG: breath sounds bilaterally  CARDIOVASCULAR: regular rate and rhythm, normal S1 and S2, no murmur/rub/gallop  ABDOMEN: positive bowel sounds, non-tender, non-distended, no organomegaly   MUSCULOSKELETAL:  moving all four extremities   EXTREMITIES:  no lower extremity edema  NEUROLOGY: sedated        HOSPITAL MEDICATIONS:  MEDICATIONS  (STANDING):  acetylcysteine 20%  Inhalation 3 milliLiter(s) Inhalation every 6 hours  albuterol/ipratropium for Nebulization 3 milliLiter(s) Nebulizer every 6 hours  amLODIPine   Tablet 5 milliGRAM(s) Oral daily  atorvastatin 20 milliGRAM(s) Oral at bedtime  cholecalciferol 1000 Unit(s) Oral daily  dexAMETHasone  Injectable 6 milliGRAM(s) IV Push daily  escitalopram 15 milliGRAM(s) Oral with breakfast  gabapentin Solution 150 milliGRAM(s) Oral every 12 hours  heparin  Infusion.  Unit(s)/Hr (17 mL/Hr) IV Continuous <Continuous>  hydrALAZINE 25 milliGRAM(s) Oral three times a day  influenza  Vaccine (HIGH DOSE) 0.5 milliLiter(s) IntraMuscular once  lacosamide IVPB 200 milliGRAM(s) IV Intermittent every 12 hours  levETIRAcetam  IVPB 1000 milliGRAM(s) IV Intermittent <User Schedule>  lurasidone 40 milliGRAM(s) Oral at bedtime  metoprolol tartrate Injectable 5 milliGRAM(s) IV Push every 6 hours  pantoprazole    Tablet 40 milliGRAM(s) Oral before breakfast  piperacillin/tazobactam IVPB.. 3.375 Gram(s) IV Intermittent every 8 hours  polyethylene glycol 3350 17 Gram(s) Oral daily  propofol Infusion 10 MICROgram(s)/kG/Min (5.62 mL/Hr) IV Continuous <Continuous>  senna 2 Tablet(s) Oral at bedtime    MEDICATIONS  (PRN):  acetaminophen     Tablet .. 650 milliGRAM(s) Oral every 6 hours PRN Temp greater or equal to 38C (100.4F), Mild Pain (1 - 3)  aluminum hydroxide/magnesium hydroxide/simethicone Suspension 30 milliLiter(s) Oral every 4 hours PRN Dyspepsia  heparin   Injectable 7500 Unit(s) IV Push every 6 hours PRN For aPTT less than 40  heparin   Injectable 3500 Unit(s) IV Push every 6 hours PRN For aPTT between 40 - 57  melatonin 3 milliGRAM(s) Oral at bedtime PRN Insomnia  ondansetron Injectable 4 milliGRAM(s) IV Push every 8 hours PRN Nausea and/or Vomiting      LABS:                        8.6    8.59  )-----------( 106      ( 22 Feb 2025 06:08 )             28.3     02-22    149[H]  |  114[H]  |  54[H]  ----------------------------<  109[H]  4.1   |  21[L]  |  2.47[H]    Ca    9.8      22 Feb 2025 06:08  Phos  4.1     02-21  Mg     2.3     02-21    TPro  6.4  /  Alb  2.9[L]  /  TBili  0.4  /  DBili  x   /  AST  50[H]  /  ALT  33  /  AlkPhos  148[H]  02-22    PT/INR - ( 22 Feb 2025 06:08 )   PT: 12.1 sec;   INR: 1.05 ratio         PTT - ( 22 Feb 2025 19:06 )  PTT:58.8 sec  ABG - ( 22 Feb 2025 06:05 )  pH, Arterial: 7.30  pH, Blood: x     /  pCO2: 46    /  pO2: 132   / HCO3: 23    / Base Excess: -4.0  /  SaO2: 99.9          Urinalysis Basic - ( 22 Feb 2025 06:08 )      POCT Blood Glucose.: 148 mg/dL (23 Feb 2025 05:41)  POCT Blood Glucose.: 126 mg/dL (22 Feb 2025 12:29)        Assessment and plan    69 year old male with a past medical history of hypertension, hyperlipemia VAZQUEZ (on CPAP at bedtime, NC 2 liters during the day), CKD stage 3, epilepsy, schizophrenia, developmental delay, metastatic testicular cancer (s/p orchiectomy, actively on chemotherapy, last dose of etoposide/cisplatin on 6/2024), presenting with acute on chronic hypoxemic respiratory failure, secondary to (a) COVID-19 infection, (b) superimposed pneumonia after recent influenza infection, and/or (c) fluid overload.     MICU consulted and accepted after RRT called earlier this AM due to increased work of breathing    NEURO:  #Mental status:  - Sedated, however, was AAOx2 prior to increased work of breathing that required intubation    #Epilepsy:  - Continue with home meds which include Lacosamide, Lurasidone, Lamotrigine    #Brain Mets:  - MRI brain now with 5.4 mm enhancing lesion in the LEFT middle cerebellar peduncle with associated edema suspicious for metastases      CV:  #Hemodynamically stable  - Patients BP holding currently, however, is tachycardic and tachypneic   - Tachycardia most likely 2/2 inc work of breathing and underlying infection and Afib    #Echo:  Recent echo last admission showing Left ventricular cavity is normal in size. Left ventricular systolic function is normal with an ejection fraction of 62 %. There are no regional wall motion abnormalities seen. Normal right ventricular cavity size and normal right ventricular systolic function.     - Repeat TTE pending     #Afib w/RVR  - New onset Afib RVR (on tele, confirmed with EKG 2/19)   - Plan: Started metoprolol 5mg IVP q6 hours & Hep gtt  - If rate remains uncontrolled, can start Cardizem drip at 5mg/h  - On Heparin drip, however, will hold    #HTN:  - On Hydralazine 25mg TID, Amlodipine 5mg    PULM:  #Vent   - Due to increased work of breathing, now intubated    #AHRF  - Patient admitted to the hospital for AHRF, found to be COVID (+)  - Likely 2/2 PNA, does not appear to CHF decompensation  - Diuretics currently being held  - Continue with airway clearance regimen which includes duoneb, mucomyst    RENAL:  #SHAGUFTA:   - Hx of CKD stage 3, Cr 2.38 at baseline, presented with Cr 3.27, although now near baseline  - In setting of COVID (+)  - Pre-renal, FeUrea 29%  - Monitor Cr  - Avoid nephrotoxic meds  - [place] in place      #Electrolyte abnormalities  Hypernatremia  - Continue to monitor  - If uptrending can consider D5W  - Free water deficit 1.4  - F/u urine studies  - Nephro recs appreciated    GI:  No acute issues     #Diet:  - Currently NPO  - OG tube to be placed    #Bowel Regimen  - On Senna and Miralax    ENDO:  No acute issues  Thydoid nodule noted in prior notes    HEMATOLOGIC:  #Thrombocytopenic  - Unclear origin  - Concern for possible mets to bone marrow? vs due to infection  - Continue to monitor  - Transfuse to maintain Plt>10K unless actively bleeding then maintain >50K  - Heme-Onc recs appreciated    #DVT prophylaxis   - SCD    ID:  #COVID-19  - Concern for possible superimposed PNA   - Continue Zosyn   - S/p Remdesivir  - Blood cx from 2 days ago negative      SKIN:  #Lines:  2x PIV    Ethics:  - Full Code  - Contact: Sister Ester [phone] (unable to be reached at time of intubation) MICU Accept Note    HPI:  69 year old male with a past medical history of hypertension, hyperlipemia VAZQUEZ (on CPAP at bedtime, NC 2 liters during the day), CKD stage 3, epilepsy, schizophrenia, developmental delay, metastatic testicular cancer (s/p orchiectomy, actively on chemotherapy, last dose of etoposide/cisplatin on 6/2024) presenting with worsening shortness of breath.     Patient was recently hospitalized at [facility] from January 27th 2025 to February 6th 2025 for seizure and influenza virus infection, which required intubation. He was sent to rehab (originally from home) from hospital.     He returns due to sudden onset shortness of breath and worsening oxygenation. Of note, he tested positive for COVID-19 at facility on 2/13/25 and was not put on any COVID-19 treatment at the time, only guaifenesin and scopolamine patch. Patient was placed on non-rebreather and sent to the hospital on 2/16/25.     In the ER, vital signs significant for T-max 101.9F, HR 84-90. WBC 6.8. Hemoglobin 9.1. Platelet 87. AST 70. ALT 48. Cr 3.27. BUN 45. pH 7.30, pCO2 48. UA negative for infection. Patient given vancomycin, Zosyn, albuterol, and  cc bolus. Patient admitted to the general medicine service for further care.     He was transitioned from HFNC to AVAPS on 02/21 after an episode of hypoxia requiring RRT and by next day his respiratory status had improved with continued AVAPS. Repeat RRT on 2/23 for hypoxia and tachypnea to 50 resulted in intubation. Intubation was complicated by possibly popped baloon requiring reintubation while in MICU.         INTERVAL HISTORY:    PAST MEDICAL & SURGICAL HISTORY:  Hypertension, unspecified type  Other hyperlipidemia  History of epilepsy  Paranoid schizophrenia  Obstructive sleep apnea  Testicular cancer  H/O Spinal surgery      HOME MEDICATIONS:  Home Medications:  atorvastatin 20 mg oral tablet: 1 tab(s) orally once a day (at bedtime) (16 Feb 2025 11:59)  cholecalciferol 25 mcg (1000 intl units) oral tablet: 1 tab(s) orally once a day (16 Feb 2025 11:59)  escitalopram 5 mg oral tablet: 3 tab(s) orally once a day (16 Feb 2025 11:59)  gabapentin 250 mg/5 mL oral solution: 3 milliliter(s) orally 2 times a day (16 Feb 2025 11:59)  ipratropium-albuterol 0.5 mg-2.5 mg/3 mL inhalation solution: 3 milliliter(s) inhaled every 6 hours (16 Feb 2025 11:59)  lacosamide 100 mg oral tablet: 1 tab(s) orally 2 times a day (16 Feb 2025 11:59)  lamoTRIgine 100 mg oral tablet: 1 tab(s) orally 2 times a day (16 Feb 2025 11:59)  levETIRAcetam 750 mg oral tablet: 1 tab(s) orally 2 times a day (16 Feb 2025 11:59)  lidocaine 4% topical film: Apply topically to affected area once a day (16 Feb 2025 11:59)  lurasidone 40 mg oral tablet: 1 tab(s) orally once a day (16 Feb 2025 11:59)  nystatin 100,000 units/g topical powder: 1 Apply topically to affected area 3 times a day As needed fungal infection you may see and want to treat (16 Feb 2025 11:59)  pantoprazole 40 mg oral delayed release tablet: 1 tab(s) orally once a day (before a meal) (16 Feb 2025 11:59)  polyethylene glycol 3350 oral powder for reconstitution: 17 gram(s) orally once a day (16 Feb 2025 11:59)  senna leaf extract oral tablet: 2 tab(s) orally once a day (at bedtime) (16 Feb 2025 11:59)      Allergies  seasonal allergies (Unknown)  penicillin (Hives)      REVIEW OF SYSTEMS:  [X] Unable to assess ROS because patient is intubated    OBJECTIVE:  ICU Vital Signs Last 24 Hrs  T(C): 36.4 (23 Feb 2025 03:20), Max: 37 (22 Feb 2025 19:46)  T(F): 97.6 (23 Feb 2025 03:20), Max: 98.6 (22 Feb 2025 19:46)  HR: 112 (23 Feb 2025 05:09) (75 - 136)  BP: 135/82 (23 Feb 2025 04:21) (112/72 - 175/100)  RR: 39 (23 Feb 2025 05:09) (20 - 39)  SpO2: 97% (23 Feb 2025 05:09) (95% - 99%)    O2 Parameters below as of 23 Feb 2025 05:09  Patient On (Oxygen Delivery Method): BiPAP/CPAP        02-21 @ 07:01  -  02-22 @ 07:00  --------------------------------------------------------  IN: 350 mL / OUT: 1550 mL / NET: -1200 mL    02-22 @ 07:01  - 02-23 @ 05:58  --------------------------------------------------------  IN: 121 mL / OUT: 1650 mL / NET: -1529 mL      CAPILLARY BLOOD GLUCOSE      POCT Blood Glucose.: 148 mg/dL (23 Feb 2025 05:41)    PHYSICAL EXAM:   GENERAL: Intubated sedated   HEENT: NCAT  NECK: supple without JVD  CHEST/LUNG: breath sounds bilaterally  CARDIOVASCULAR: regular rate and rhythm, normal S1 and S2, no murmur/rub/gallop  ABDOMEN: positive bowel sounds, non-tender, non-distended, no organomegaly   MUSCULOSKELETAL:  moving all four extremities   EXTREMITIES:  no lower extremity edema  NEUROLOGY: sedated        HOSPITAL MEDICATIONS:  MEDICATIONS  (STANDING):  acetylcysteine 20%  Inhalation 3 milliLiter(s) Inhalation every 6 hours  albuterol/ipratropium for Nebulization 3 milliLiter(s) Nebulizer every 6 hours  amLODIPine   Tablet 5 milliGRAM(s) Oral daily  atorvastatin 20 milliGRAM(s) Oral at bedtime  cholecalciferol 1000 Unit(s) Oral daily  dexAMETHasone  Injectable 6 milliGRAM(s) IV Push daily  escitalopram 15 milliGRAM(s) Oral with breakfast  gabapentin Solution 150 milliGRAM(s) Oral every 12 hours  heparin  Infusion.  Unit(s)/Hr (17 mL/Hr) IV Continuous <Continuous>  hydrALAZINE 25 milliGRAM(s) Oral three times a day  influenza  Vaccine (HIGH DOSE) 0.5 milliLiter(s) IntraMuscular once  lacosamide IVPB 200 milliGRAM(s) IV Intermittent every 12 hours  levETIRAcetam  IVPB 1000 milliGRAM(s) IV Intermittent <User Schedule>  lurasidone 40 milliGRAM(s) Oral at bedtime  metoprolol tartrate Injectable 5 milliGRAM(s) IV Push every 6 hours  pantoprazole    Tablet 40 milliGRAM(s) Oral before breakfast  piperacillin/tazobactam IVPB.. 3.375 Gram(s) IV Intermittent every 8 hours  polyethylene glycol 3350 17 Gram(s) Oral daily  propofol Infusion 10 MICROgram(s)/kG/Min (5.62 mL/Hr) IV Continuous <Continuous>  senna 2 Tablet(s) Oral at bedtime    MEDICATIONS  (PRN):  acetaminophen     Tablet .. 650 milliGRAM(s) Oral every 6 hours PRN Temp greater or equal to 38C (100.4F), Mild Pain (1 - 3)  aluminum hydroxide/magnesium hydroxide/simethicone Suspension 30 milliLiter(s) Oral every 4 hours PRN Dyspepsia  heparin   Injectable 7500 Unit(s) IV Push every 6 hours PRN For aPTT less than 40  heparin   Injectable 3500 Unit(s) IV Push every 6 hours PRN For aPTT between 40 - 57  melatonin 3 milliGRAM(s) Oral at bedtime PRN Insomnia  ondansetron Injectable 4 milliGRAM(s) IV Push every 8 hours PRN Nausea and/or Vomiting      LABS:                        8.6    8.59  )-----------( 106      ( 22 Feb 2025 06:08 )             28.3     02-22    149[H]  |  114[H]  |  54[H]  ----------------------------<  109[H]  4.1   |  21[L]  |  2.47[H]    Ca    9.8      22 Feb 2025 06:08  Phos  4.1     02-21  Mg     2.3     02-21    TPro  6.4  /  Alb  2.9[L]  /  TBili  0.4  /  DBili  x   /  AST  50[H]  /  ALT  33  /  AlkPhos  148[H]  02-22    PT/INR - ( 22 Feb 2025 06:08 )   PT: 12.1 sec;   INR: 1.05 ratio         PTT - ( 22 Feb 2025 19:06 )  PTT:58.8 sec  ABG - ( 22 Feb 2025 06:05 )  pH, Arterial: 7.30  pH, Blood: x     /  pCO2: 46    /  pO2: 132   / HCO3: 23    / Base Excess: -4.0  /  SaO2: 99.9          Urinalysis Basic - ( 22 Feb 2025 06:08 )      POCT Blood Glucose.: 148 mg/dL (23 Feb 2025 05:41)  POCT Blood Glucose.: 126 mg/dL (22 Feb 2025 12:29)        Assessment and plan    69 year old male with a past medical history of hypertension, hyperlipemia VAZQUEZ (on CPAP at bedtime, NC 2 liters during the day), CKD stage 3, epilepsy, schizophrenia, developmental delay, metastatic testicular cancer (s/p orchiectomy, actively on chemotherapy, last dose of etoposide/cisplatin on 6/2024), presenting with acute on chronic hypoxemic respiratory failure, secondary to (a) COVID-19 infection, (b) superimposed pneumonia after recent influenza infection, and/or (c) fluid overload.     MICU consulted and accepted after RRT called earlier this AM due to increased work of breathing    NEURO:  #Mental status:  - Sedated, however, was AAOx2 prior to increased work of breathing that required intubation    #Epilepsy:  - Continue with home meds which include Lacosamide, Lurasidone, Lamotrigine    #Brain Mets:  - MRI brain now with 5.4 mm enhancing lesion in the LEFT middle cerebellar peduncle with associated edema suspicious for metastases      CV:  #Hemodynamically stable  - Patients BP holding currently, however, is tachycardic and tachypneic   - Tachycardia most likely 2/2 inc work of breathing and underlying infection and Afib    #Echo:  Recent echo last admission showing Left ventricular cavity is normal in size. Left ventricular systolic function is normal with an ejection fraction of 62 %. There are no regional wall motion abnormalities seen. Normal right ventricular cavity size and normal right ventricular systolic function.     - Repeat TTE pending     #Afib w/RVR  - New onset Afib RVR (on tele, confirmed with EKG 2/19)   - Plan: Started metoprolol 5mg IVP q6 hours & Hep gtt  - If rate remains uncontrolled, can start Cardizem drip at 5mg/h  - On Heparin drip, however, will hold    #HTN:  - On Hydralazine 25mg TID, Amlodipine 5mg    PULM:  #Vent   - Due to increased work of breathing, now intubated    #AHRF  - Patient admitted to the hospital for AHRF, found to be COVID (+)  - Likely 2/2 PNA, does not appear to CHF decompensation  - Diuretics currently being held  - Continue with airway clearance regimen which includes duoneb, mucomyst    RENAL:  #SHAGUFTA:   - Hx of CKD stage 3, Cr 2.38 at baseline, presented with Cr 3.27, although now near baseline  - In setting of COVID (+)  - Pre-renal, FeUrea 29%  - Monitor Cr  - Avoid nephrotoxic meds  - [place] in place      #Electrolyte abnormalities  Hypernatremia  - Continue to monitor  - If uptrending can consider D5W  - Free water deficit 1.4  - F/u urine studies  - Nephro recs appreciated    GI:  No acute issues     #Diet:  - Currently NPO  - OG tube to be placed    #Bowel Regimen  - On Senna and Miralax    ENDO:  No acute issues  Thydoid nodule noted in prior notes    HEMATOLOGIC:  #Thrombocytopenic  - Unclear origin  - Concern for possible mets to bone marrow? vs due to infection  - Continue to monitor  - Transfuse to maintain Plt>10K unless actively bleeding then maintain >50K  - Heme-Onc recs appreciated    #DVT prophylaxis   - SCD    ID:  #COVID-19  - Concern for possible superimposed PNA   - Continue Zosyn   - S/p Remdesivir  - Blood cx from 2 days ago negative      SKIN:  #Lines:  2x PIV    Ethics:  - Full Code  - Contact: Sister Ester [phone] (unable to be reached at time of intubation)    Attending Attestation:    Patient is critically ill, requiring critical care services.    Attending: I have personally and independently provided 50 minutes of critical care services.  This excludes any time spent on separate procedures or teaching.    Tay Gr is a 69 year old male with a past medical history of hypertension, hyperlipemia VAZQUEZ (on CPAP at bedtime, NC 2 liters during the day), CKD stage 3, epilepsy, schizophrenia, developmental delay, metastatic testicular cancer (s/p orchiectomy, actively on chemotherapy, last dose of etoposide/cisplatin on 6/2024), who initially presented with acute on chronic hypoxic respiratory failure.  He was ultimately intubated by anesthesia on the floor for increased work of breathing and hypoxia and subsequently admitted to MICU for further management    Neuro  #Seizure disorder  -continue keppra, vimpat,   -propofol/versed/fentanyl for sedation while receiving mechanical ventilation  #Brain Mets  -detected on MRI 1/24/25, will consider repeating CTH to monitor for any interval change  #Schizophrenia  -continue lurasidone    Cardiovascular  #Shock  -likely septic in setting of COVID and/or superimposed bacterial pneumonia as well as vasoplegic given sedation use post intubation  -vasopressors to maintain MAP>65  #Afib  -will hold heparin gtt for now until patient stabilized in ICU    Respiratory  #Acute hypoxic respiratory failure  -CT chest with bilateral GGO consistent with COVID pneumonia and/or bacterial process, would also consider organizing process as well as pulmonary edema contributing to increased O2 requirements  -maintain lung protective ventilation  -continue duonebs, mucomyst, dexamethasone for COVID    Renal  #Hypernatremia  -likely from insensible losses/poor PO intake given previous consistent BiPAP use  -will start D5W    ID  #COVID Pneumonia  #Bacterial pneumonia  -will continue dexamethasone for COVID, patient already received remdesevir  -continue zosyn    Hem-Onc  #Anemia  #Thrombocytopenia  #Metastatic testicular cancer  -hem-onc following, will need PET-CT at this point, otherwise there does not appear to be plans for inpatient chemo  -transfuse Hgb >7, platelets >10 unless bleeding    GI  -PPI for stress ulcer prophylaxis given shock state  -miralax/senna for bowel regimen    DVT prophylasix: heparin subq until heparin gtt resumed  Code Status: Full code

## 2025-02-23 NOTE — PROGRESS NOTE ADULT - SUBJECTIVE AND OBJECTIVE BOX
[facility]   [phone]  RENAL FOLLOW UP NOTE  --------------------------------------------------------------------------------  Chief Complaint:    24 hour events/subjective:    seen earlier  intubated     PAST HISTORY  --------------------------------------------------------------------------------  No significant changes to PMH, PSH, FHx, SHx, unless otherwise noted    ALLERGIES & MEDICATIONS  --------------------------------------------------------------------------------  Allergies    seasonal allergies (Unknown)  penicillin (Hives)    Intolerances    latex (Rash)    Standing Inpatient Medications  acetylcysteine 20%  Inhalation 3 milliLiter(s) Inhalation every 6 hours  albuterol/ipratropium for Nebulization 3 milliLiter(s) Nebulizer every 6 hours  amLODIPine   Tablet 5 milliGRAM(s) Oral daily  atorvastatin 20 milliGRAM(s) Oral at bedtime  chlorhexidine 0.12% Liquid 15 milliLiter(s) Oral Mucosa every 12 hours  cholecalciferol 1000 Unit(s) Oral daily  dexAMETHasone  Injectable 6 milliGRAM(s) IV Push daily  escitalopram 15 milliGRAM(s) Oral with breakfast  fentaNYL   Infusion.. 2 MICROgram(s)/kG/Hr IV Continuous <Continuous>  gabapentin Solution 150 milliGRAM(s) Oral every 12 hours  heparin  Infusion.  Unit(s)/Hr IV Continuous <Continuous>  hydrALAZINE 25 milliGRAM(s) Oral three times a day  influenza  Vaccine (HIGH DOSE) 0.5 milliLiter(s) IntraMuscular once  lacosamide IVPB 200 milliGRAM(s) IV Intermittent every 12 hours  levETIRAcetam  IVPB 1000 milliGRAM(s) IV Intermittent <User Schedule>  lurasidone 40 milliGRAM(s) Oral at bedtime  metoprolol tartrate Injectable 5 milliGRAM(s) IV Push every 6 hours  naloxegol 12.5 milliGRAM(s) Oral daily  norepinephrine Infusion 0.05 MICROgram(s)/kG/Min IV Continuous <Continuous>  pantoprazole    Tablet 40 milliGRAM(s) Oral before breakfast  piperacillin/tazobactam IVPB.. 3.375 Gram(s) IV Intermittent every 8 hours  polyethylene glycol 3350 17 Gram(s) Oral daily  propofol Infusion 10 MICROgram(s)/kG/Min IV Continuous <Continuous>  senna 2 Tablet(s) Oral at bedtime    PRN Inpatient Medications  acetaminophen     Tablet .. 650 milliGRAM(s) Oral every 6 hours PRN  aluminum hydroxide/magnesium hydroxide/simethicone Suspension 30 milliLiter(s) Oral every 4 hours PRN  heparin   Injectable 7500 Unit(s) IV Push every 6 hours PRN  heparin   Injectable 3500 Unit(s) IV Push every 6 hours PRN  melatonin 3 milliGRAM(s) Oral at bedtime PRN  ondansetron Injectable 4 milliGRAM(s) IV Push every 8 hours PRN      REVIEW OF SYSTEMS  --------------------------------------------------------------------------------        VITALS/PHYSICAL EXAM  --------------------------------------------------------------------------------  T(C): 36.8 (02-23-25 @ 16:00), Max: 37.3 (02-23-25 @ 12:00)  HR: 80 (02-23-25 @ 19:30) (73 - 136)  BP: 98/53 (02-23-25 @ 19:30) (88/52 - 214/109)  RR: 16 (02-23-25 @ 19:30) (16 - 57)  SpO2: 100% (02-23-25 @ 19:30) (63% - 100%)  Wt(kg): --        02-22-25 @ 07:01  -  02-23-25 @ 07:00  --------------------------------------------------------  IN: 121 mL / OUT: 1650 mL / NET: -1529 mL    02-23-25 @ 07:01  -  02-23-25 @ 19:37  --------------------------------------------------------  IN: 1620.3 mL / OUT: 0 mL / NET: 1620.3 mL      Physical Exam:  	      Gen: ill appearing male, intubated   	Pulm: decrease bs, +coarse   	CV: No JVD. RRR, S1S2; no rub  	Abd: +BS, soft, nontender/nondistended  	: No suprapubic tenderness, external catheter  	UE: Warm, no cyanosis  no clubbing,  no edema;  	LE: Warm, no cyanosis  no clubbing, no edema    LABS/STUDIES  --------------------------------------------------------------------------------              8.1    10.78 >-----------<  107      [02-23-25 @ 16:19]              26.4     151  |  114  |  54  ----------------------------<  124      [02-23-25 @ 06:11]  4.1   |  21  |  2.64        Ca     10.0     [02-23-25 @ 06:11]    TPro  6.4  /  Alb  2.9  /  TBili  0.4  /  DBili  x   /  AST  50  /  ALT  33  /  AlkPhos  148  [02-22-25 @ 06:08]    PT/INR: PT 12.8 , INR 1.12       [02-23-25 @ 06:14]  PTT: 103.0      [02-23-25 @ 16:19]          [02-23-25 @ 06:11]    Creatinine Trend:  SCr 2.64 [02-23 @ 06:11]  SCr 2.47 [02-22 @ 06:08]  SCr 2.54 [02-21 @ 19:36]  SCr 2.44 [02-21 @ 14:13]  SCr 0.68 [02-21 @ 01:02]            Ferritin [id number]      [02-23-25 @ 06:15]  TSH 0.87      [01-25-25 @ 11:31]

## 2025-02-23 NOTE — RAPID RESPONSE TEAM SUMMARY - NSSITUATIONBACKGROUNDRRT_GEN_ALL_CORE
69 year old male with a past medical history of hypertension, hyperlipemia VAZQUEZ (on CPAP at bedtime, NC 2 liters during the day), CKD stage 3, epilepsy, schizophrenia, developmental delay, metastatic testicular cancer (s/p orchiectomy, actively on chemotherapy, last dose of etoposide/cisplatin on 6/2024), presenting with acute on chronic hypoxemic respiratory failure, secondary to (a) COVID-19 infection, (b) superimposed pneumonia after recent influenza infection, and/or (c) fluid overload.    RRT called for increased WOB. On arrival to RRT, pt has already been on AVAPS. His RR is 54 with . He is in significant respiratory distress. 87% on FiO2 80% with good waveform. BP hypertensive. 's. Anesthesia called for intubation. Attempted to call family but no answer. Per prior notes but would want to be full code. Pt intubated - had transient desaturation to 65% for about 10s and quickly returned to normal sat. + B/L breath sounds and color change. PT started on levophed and prop. Levo weaned off prior to MICU arrival. Transferred to MICU for further management.

## 2025-02-23 NOTE — PROGRESS NOTE ADULT - SUBJECTIVE AND OBJECTIVE BOX
INTERVAL HPI/OVERNIGHT EVENTS: s/p exchanged on ETT due to cuff leak     SUBJECTIVE: Patient seen and examined at bedside.     VITAL SIGNS:  ICU Vital Signs Last 24 Hrs  T(C): 36.4 (23 Feb 2025 03:20), Max: 37 (22 Feb 2025 19:46)  T(F): 97.6 (23 Feb 2025 03:20), Max: 98.6 (22 Feb 2025 19:46)  HR: 97 (23 Feb 2025 07:45) (84 - 136)  BP: 153/65 (23 Feb 2025 07:45) (112/72 - 214/109)  BP(mean): 97 (23 Feb 2025 07:45) (78 - 151)  ABP: --  ABP(mean): --  RR: 27 (23 Feb 2025 07:45) (20 - 57)  SpO2: 98% (23 Feb 2025 07:45) (63% - 100%)    O2 Parameters below as of 23 Feb 2025 06:14  Patient On (Oxygen Delivery Method): ventilator    O2 Concentration (%): 100      Mode: AC/ CMV (Assist Control/ Continuous Mandatory Ventilation), RR (machine): 16, TV (machine): 500, FiO2: 100, PEEP: 6, ITime: 0.97, MAP: 17, PIP: 37  Plateau pressure:   P/F ratio:     02-22 @ 07:01  -  02-23 @ 07:00  --------------------------------------------------------  IN: 121 mL / OUT: 1650 mL / NET: -1529 mL      CAPILLARY BLOOD GLUCOSE      POCT Blood Glucose.: 148 mg/dL (23 Feb 2025 05:41)      ECG: reviewed.    PHYSICAL EXAM:   GENERAL: Intubated sedated   HEENT: NCAT  NECK: supple without JVD  CHEST/LUNG: breath sounds bilaterally  CARDIOVASCULAR: regular rate and rhythm, normal S1 and S2, no murmur/rub/gallop  ABDOMEN: positive bowel sounds, non-tender, non-distended, no organomegaly   MUSCULOSKELETAL:  moving all four extremities   EXTREMITIES:  no lower extremity edema  NEUROLOGY: sedated    MEDICATIONS:  MEDICATIONS  (STANDING):  acetylcysteine 20%  Inhalation 3 milliLiter(s) Inhalation every 6 hours  albuterol/ipratropium for Nebulization 3 milliLiter(s) Nebulizer every 6 hours  amLODIPine   Tablet 5 milliGRAM(s) Oral daily  atorvastatin 20 milliGRAM(s) Oral at bedtime  chlorhexidine 0.12% Liquid 15 milliLiter(s) Oral Mucosa every 12 hours  cholecalciferol 1000 Unit(s) Oral daily  dexAMETHasone  Injectable 6 milliGRAM(s) IV Push daily  dextrose 5%. 1000 milliLiter(s) (100 mL/Hr) IV Continuous <Continuous>  escitalopram 15 milliGRAM(s) Oral with breakfast  fentaNYL   Infusion.. 0.5 MICROgram(s)/kG/Hr (2.34 mL/Hr) IV Continuous <Continuous>  gabapentin Solution 150 milliGRAM(s) Oral every 12 hours  heparin  Infusion.  Unit(s)/Hr (17 mL/Hr) IV Continuous <Continuous>  hydrALAZINE 25 milliGRAM(s) Oral three times a day  influenza  Vaccine (HIGH DOSE) 0.5 milliLiter(s) IntraMuscular once  lacosamide IVPB 200 milliGRAM(s) IV Intermittent every 12 hours  levETIRAcetam  IVPB 1000 milliGRAM(s) IV Intermittent <User Schedule>  lurasidone 40 milliGRAM(s) Oral at bedtime  metoprolol tartrate Injectable 5 milliGRAM(s) IV Push every 6 hours  norepinephrine Infusion 0.05 MICROgram(s)/kG/Min (8.78 mL/Hr) IV Continuous <Continuous>  pantoprazole    Tablet 40 milliGRAM(s) Oral before breakfast  piperacillin/tazobactam IVPB.. 3.375 Gram(s) IV Intermittent every 8 hours  polyethylene glycol 3350 17 Gram(s) Oral daily  propofol Infusion 10 MICROgram(s)/kG/Min (5.62 mL/Hr) IV Continuous <Continuous>  senna 2 Tablet(s) Oral at bedtime    MEDICATIONS  (PRN):  acetaminophen     Tablet .. 650 milliGRAM(s) Oral every 6 hours PRN Temp greater or equal to 38C (100.4F), Mild Pain (1 - 3)  aluminum hydroxide/magnesium hydroxide/simethicone Suspension 30 milliLiter(s) Oral every 4 hours PRN Dyspepsia  heparin   Injectable 7500 Unit(s) IV Push every 6 hours PRN For aPTT less than 40  heparin   Injectable 3500 Unit(s) IV Push every 6 hours PRN For aPTT between 40 - 57  melatonin 3 milliGRAM(s) Oral at bedtime PRN Insomnia  ondansetron Injectable 4 milliGRAM(s) IV Push every 8 hours PRN Nausea and/or Vomiting      ALLERGIES:  Allergies    seasonal allergies (Unknown)  penicillin (Hives)    Intolerances    latex (Rash)      LABS:                        9.2    9.74  )-----------( 105      ( 23 Feb 2025 06:14 )             29.8     02-23    151[H]  |  114[H]  |  54[H]  ----------------------------<  124[H]  4.1   |  21[L]  |  2.64[H]    Ca    10.0      23 Feb 2025 06:11  Phos  4.1     02-21  Mg     2.3     02-21    TPro  6.4  /  Alb  2.9[L]  /  TBili  0.4  /  DBili  x   /  AST  50[H]  /  ALT  33  /  AlkPhos  148[H]  02-22    PT/INR - ( 23 Feb 2025 06:14 )   PT: 12.8 sec;   INR: 1.12 ratio         PTT - ( 23 Feb 2025 06:14 )  PTT:52.8 sec  Urinalysis Basic - ( 23 Feb 2025 06:11 )    Color: x / Appearance: x / SG: x / pH: x  Gluc: 124 mg/dL / Ketone: x  / Bili: x / Urobili: x   Blood: x / Protein: x / Nitrite: x   Leuk Esterase: x / RBC: x / WBC x   Sq Epi: x / Non Sq Epi: x / Bacteria: x      ABG:  pH, Arterial: 7.23 (02-23-25 @ 06:33)  pCO2, Arterial: 56 mmHg (02-23-25 @ 06:33)  pO2, Arterial: 116 mmHg (02-23-25 @ 06:33)      vBG:  pH, Venous: 7.17 (02-23-25 @ 08:07)  pCO2, Venous: 64 mmHg (02-23-25 @ 08:07)  pO2, Venous: 48 mmHg (02-23-25 @ 08:07)  HCO3, Venous: 23 mmol/L (02-23-25 @ 08:07)  pH, Venous: 7.23 (02-23-25 @ 06:11)  pCO2, Venous: 57 mmHg (02-23-25 @ 06:11)  pO2, Venous: 48 mmHg (02-23-25 @ 06:11)  HCO3, Venous: 24 mmol/L (02-23-25 @ 06:11)    Micro:    Culture - Blood (collected 02-21-25 @ 19:36)  Source: .Blood Blood  Preliminary Report (02-23-25 @ 02:02):    No growth at 24 hours    Culture - Blood (collected 02-21-25 @ 19:35)  Source: .Blood Blood  Preliminary Report (02-23-25 @ 02:02):    No growth at 24 hours    Culture - Blood (collected 02-19-25 @ 00:18)  Source: .Blood Blood-Peripheral  Preliminary Report (02-23-25 @ 03:01):    No growth at 4 days    Culture - Blood (collected 02-19-25 @ 00:18)  Source: .Blood Blood-Peripheral  Preliminary Report (02-23-25 @ 03:01):    No growth at 4 days    Culture - Blood (collected 02-16-25 @ 09:35)  Source: .Blood BLOOD  Final Report (02-21-25 @ 15:01):    No growth at 5 days    Culture - Blood (collected 02-16-25 @ 09:25)  Source: .Blood BLOOD  Final Report (02-21-25 @ 15:01):    No growth at 5 days        Culture - Sputum (collected 02-17-25 @ 22:23)  Source: .Sputum Sputum  Gram Stain (02-18-25 @ 06:21):    Few polymorphonuclear leukocytes per low power field    No Squamous epithelial cells per low power field    Few Gram Variable Rods seen per oil power field    Rare Gram positive cocci in pairs seen per oil power field  Final Report (02-19-25 @ 16:20):    Commensal lucia consistent with body site    Culture - Sputum (collected 01-25-25 @ 07:21)  Source: .Sputum Sputum  Gram Stain (01-26-25 @ 23:12):    Numerous polymorphonuclear leukocytes per low power field    Rare Squamous epithelial cells per low power field    No organisms seen  Final Report (01-27-25 @ 17:11):    Rare Pseudomonas aeruginosa    Commensal lucia consistent with body site  Organism: Pseudomonas aeruginosa (01-27-25 @ 17:11)  Organism: Pseudomonas aeruginosa (01-27-25 @ 17:11)      Method Type: MARIELENA      -  Aztreonam: S <=4      -  Cefepime: S <=2      -  Ceftazidime: S 4      -  Ciprofloxacin: S <=0.25      -  Imipenem: S 2      -  Levofloxacin: S <=0.5      -  Meropenem: S <=1      -  Piperacillin/Tazobactam: S <=8        RADIOLOGY & ADDITIONAL TESTS: Reviewed.   INTERVAL HPI/OVERNIGHT EVENTS: s/p exchanged on ETT due to cuff leak     SUBJECTIVE: Patient seen and examined at bedside.     VITAL SIGNS:  ICU Vital Signs Last 24 Hrs  T(C): 36.4 (23 Feb 2025 03:20), Max: 37 (22 Feb 2025 19:46)  T(F): 97.6 (23 Feb 2025 03:20), Max: 98.6 (22 Feb 2025 19:46)  HR: 97 (23 Feb 2025 07:45) (84 - 136)  BP: 153/65 (23 Feb 2025 07:45) (112/72 - 214/109)  BP(mean): 97 (23 Feb 2025 07:45) (78 - 151)  ABP: --  ABP(mean): --  RR: 27 (23 Feb 2025 07:45) (20 - 57)  SpO2: 98% (23 Feb 2025 07:45) (63% - 100%)    O2 Parameters below as of 23 Feb 2025 06:14  Patient On (Oxygen Delivery Method): ventilator    O2 Concentration (%): 100      Mode: AC/ CMV (Assist Control/ Continuous Mandatory Ventilation), RR (machine): 16, TV (machine): 500, FiO2: 100, PEEP: 6, ITime: 0.97, MAP: 17, PIP: 37  Plateau pressure:   P/F ratio:     02-22 @ 07:01  -  02-23 @ 07:00  --------------------------------------------------------  IN: 121 mL / OUT: 1650 mL / NET: -1529 mL      CAPILLARY BLOOD GLUCOSE      POCT Blood Glucose.: 148 mg/dL (23 Feb 2025 05:41)      ECG: reviewed.    PHYSICAL EXAM:   GENERAL: Intubated sedated, increased WOB  HEENT: NCAT  NECK: supple without JVD  CHEST/LUNG: breath sounds bilaterally  CARDIOVASCULAR: regular rate and rhythm, normal S1 and S2, no murmur/rub/gallop  ABDOMEN: positive bowel sounds, non-tender, non-distended, no organomegaly   MUSCULOSKELETAL:  moving all four extremities   EXTREMITIES:  no lower extremity edema  NEUROLOGY: sedated    MEDICATIONS:  MEDICATIONS  (STANDING):  acetylcysteine 20%  Inhalation 3 milliLiter(s) Inhalation every 6 hours  albuterol/ipratropium for Nebulization 3 milliLiter(s) Nebulizer every 6 hours  amLODIPine   Tablet 5 milliGRAM(s) Oral daily  atorvastatin 20 milliGRAM(s) Oral at bedtime  chlorhexidine 0.12% Liquid 15 milliLiter(s) Oral Mucosa every 12 hours  cholecalciferol 1000 Unit(s) Oral daily  dexAMETHasone  Injectable 6 milliGRAM(s) IV Push daily  dextrose 5%. 1000 milliLiter(s) (100 mL/Hr) IV Continuous <Continuous>  escitalopram 15 milliGRAM(s) Oral with breakfast  fentaNYL   Infusion.. 0.5 MICROgram(s)/kG/Hr (2.34 mL/Hr) IV Continuous <Continuous>  gabapentin Solution 150 milliGRAM(s) Oral every 12 hours  heparin  Infusion.  Unit(s)/Hr (17 mL/Hr) IV Continuous <Continuous>  hydrALAZINE 25 milliGRAM(s) Oral three times a day  influenza  Vaccine (HIGH DOSE) 0.5 milliLiter(s) IntraMuscular once  lacosamide IVPB 200 milliGRAM(s) IV Intermittent every 12 hours  levETIRAcetam  IVPB 1000 milliGRAM(s) IV Intermittent <User Schedule>  lurasidone 40 milliGRAM(s) Oral at bedtime  metoprolol tartrate Injectable 5 milliGRAM(s) IV Push every 6 hours  norepinephrine Infusion 0.05 MICROgram(s)/kG/Min (8.78 mL/Hr) IV Continuous <Continuous>  pantoprazole    Tablet 40 milliGRAM(s) Oral before breakfast  piperacillin/tazobactam IVPB.. 3.375 Gram(s) IV Intermittent every 8 hours  polyethylene glycol 3350 17 Gram(s) Oral daily  propofol Infusion 10 MICROgram(s)/kG/Min (5.62 mL/Hr) IV Continuous <Continuous>  senna 2 Tablet(s) Oral at bedtime    MEDICATIONS  (PRN):  acetaminophen     Tablet .. 650 milliGRAM(s) Oral every 6 hours PRN Temp greater or equal to 38C (100.4F), Mild Pain (1 - 3)  aluminum hydroxide/magnesium hydroxide/simethicone Suspension 30 milliLiter(s) Oral every 4 hours PRN Dyspepsia  heparin   Injectable 7500 Unit(s) IV Push every 6 hours PRN For aPTT less than 40  heparin   Injectable 3500 Unit(s) IV Push every 6 hours PRN For aPTT between 40 - 57  melatonin 3 milliGRAM(s) Oral at bedtime PRN Insomnia  ondansetron Injectable 4 milliGRAM(s) IV Push every 8 hours PRN Nausea and/or Vomiting      ALLERGIES:  Allergies    seasonal allergies (Unknown)  penicillin (Hives)    Intolerances    latex (Rash)      LABS:                        9.2    9.74  )-----------( 105      ( 23 Feb 2025 06:14 )             29.8     02-23    151[H]  |  114[H]  |  54[H]  ----------------------------<  124[H]  4.1   |  21[L]  |  2.64[H]    Ca    10.0      23 Feb 2025 06:11  Phos  4.1     02-21  Mg     2.3     02-21    TPro  6.4  /  Alb  2.9[L]  /  TBili  0.4  /  DBili  x   /  AST  50[H]  /  ALT  33  /  AlkPhos  148[H]  02-22    PT/INR - ( 23 Feb 2025 06:14 )   PT: 12.8 sec;   INR: 1.12 ratio         PTT - ( 23 Feb 2025 06:14 )  PTT:52.8 sec  Urinalysis Basic - ( 23 Feb 2025 06:11 )    Color: x / Appearance: x / SG: x / pH: x  Gluc: 124 mg/dL / Ketone: x  / Bili: x / Urobili: x   Blood: x / Protein: x / Nitrite: x   Leuk Esterase: x / RBC: x / WBC x   Sq Epi: x / Non Sq Epi: x / Bacteria: x      ABG:  pH, Arterial: 7.23 (02-23-25 @ 06:33)  pCO2, Arterial: 56 mmHg (02-23-25 @ 06:33)  pO2, Arterial: 116 mmHg (02-23-25 @ 06:33)      vBG:  pH, Venous: 7.17 (02-23-25 @ 08:07)  pCO2, Venous: 64 mmHg (02-23-25 @ 08:07)  pO2, Venous: 48 mmHg (02-23-25 @ 08:07)  HCO3, Venous: 23 mmol/L (02-23-25 @ 08:07)  pH, Venous: 7.23 (02-23-25 @ 06:11)  pCO2, Venous: 57 mmHg (02-23-25 @ 06:11)  pO2, Venous: 48 mmHg (02-23-25 @ 06:11)  HCO3, Venous: 24 mmol/L (02-23-25 @ 06:11)    Micro:    Culture - Blood (collected 02-21-25 @ 19:36)  Source: .Blood Blood  Preliminary Report (02-23-25 @ 02:02):    No growth at 24 hours    Culture - Blood (collected 02-21-25 @ 19:35)  Source: .Blood Blood  Preliminary Report (02-23-25 @ 02:02):    No growth at 24 hours    Culture - Blood (collected 02-19-25 @ 00:18)  Source: .Blood Blood-Peripheral  Preliminary Report (02-23-25 @ 03:01):    No growth at 4 days    Culture - Blood (collected 02-19-25 @ 00:18)  Source: .Blood Blood-Peripheral  Preliminary Report (02-23-25 @ 03:01):    No growth at 4 days    Culture - Blood (collected 02-16-25 @ 09:35)  Source: .Blood BLOOD  Final Report (02-21-25 @ 15:01):    No growth at 5 days    Culture - Blood (collected 02-16-25 @ 09:25)  Source: .Blood BLOOD  Final Report (02-21-25 @ 15:01):    No growth at 5 days        Culture - Sputum (collected 02-17-25 @ 22:23)  Source: .Sputum Sputum  Gram Stain (02-18-25 @ 06:21):    Few polymorphonuclear leukocytes per low power field    No Squamous epithelial cells per low power field    Few Gram Variable Rods seen per oil power field    Rare Gram positive cocci in pairs seen per oil power field  Final Report (02-19-25 @ 16:20):    Commensal lucia consistent with body site    Culture - Sputum (collected 01-25-25 @ 07:21)  Source: .Sputum Sputum  Gram Stain (01-26-25 @ 23:12):    Numerous polymorphonuclear leukocytes per low power field    Rare Squamous epithelial cells per low power field    No organisms seen  Final Report (01-27-25 @ 17:11):    Rare Pseudomonas aeruginosa    Commensal lucia consistent with body site  Organism: Pseudomonas aeruginosa (01-27-25 @ 17:11)  Organism: Pseudomonas aeruginosa (01-27-25 @ 17:11)      Method Type: MARIELENA      -  Aztreonam: S <=4      -  Cefepime: S <=2      -  Ceftazidime: S 4      -  Ciprofloxacin: S <=0.25      -  Imipenem: S 2      -  Levofloxacin: S <=0.5      -  Meropenem: S <=1      -  Piperacillin/Tazobactam: S <=8        RADIOLOGY & ADDITIONAL TESTS: Reviewed.

## 2025-02-23 NOTE — PROGRESS NOTE ADULT - ASSESSMENT
69 year old male with a past medical history of hypertension, hyperlipemia VAZQUEZ (on CPAP at bedtime, NC 2 liters during the day), CKD stage 3, epilepsy, schizophrenia, developmental delay, metastatic testicular cancer (s/p orchiectomy, actively on chemotherapy, last dose of etoposide/cisplatin on 6/2024), presenting with acute on chronic hypoxemic respiratory failure, secondary to (a) COVID-19 infection, (b) superimposed pneumonia after recent influenza infection, and/or (c) fluid overload.     MICU consulted and accepted after RRT called earlier this AM due to increased work of breathing    NEURO:  #Mental status:  - Sedated, however, was AAOx2 prior to increased work of breathing that required intubation    #Epilepsy:  - Continue with home meds which include Lacosamide, Lurasidone, Lamotrigine    #Brain Mets:  - MRI brain now with 5.4 mm enhancing lesion in the LEFT middle cerebellar peduncle with associated edema suspicious for metastases      CV:  #Hemodynamically stable  - Patients BP holding currently, however, is tachycardic and tachypneic   - Tachycardia most likely 2/2 inc work of breathing and underlying infection and Afib    #Echo:  Recent echo last admission showing Left ventricular cavity is normal in size. Left ventricular systolic function is normal with an ejection fraction of 62 %. There are no regional wall motion abnormalities seen. Normal right ventricular cavity size and normal right ventricular systolic function.     - Repeat TTE pending     #Afib w/RVR  - New onset Afib RVR (on tele, confirmed with EKG 2/19)   - Plan: Started metoprolol 5mg IVP q6 hours & Hep gtt  - If rate remains uncontrolled, can start Cardizem drip at 5mg/h  - On Heparin drip, however, will hold    #HTN:  - On Hydralazine 25mg TID, Amlodipine 5mg    PULM:  #Vent   - Due to increased work of breathing, now intubated    #AHRF  - Patient admitted to the hospital for AHRF, found to be COVID (+)  - Likely 2/2 PNA, does not appear to CHF decompensation  - Diuretics currently being held  - Continue with airway clearance regimen which includes duoneb, mucomyst    RENAL:  #SHAGUFTA:   - Hx of CKD stage 3, Cr 2.38 at baseline, presented with Cr 3.27, although now near baseline  - In setting of COVID (+)  - Pre-renal, FeUrea 29%  - Monitor Cr  - Avoid nephrotoxic meds  - Wakefield in place      #Electrolyte abnormalities  Hypernatremia  - Continue to monitor  - If uptrending can consider D5W  - Free water deficit 1.4  - F/u urine studies  - Nephro recs appreciated    GI:  No acute issues     #Diet:  - Currently NPO  - OG tube to be placed    #Bowel Regimen  - On Senna and Miralax    ENDO:  No acute issues  Thydoid nodule noted in prior notes    HEMATOLOGIC:  #Thrombocytopenic  - Unclear origin  - Concern for possible mets to bone marrow? vs due to infection  - Continue to monitor  - Transfuse to maintain Plt>10K unless actively bleeding then maintain >50K  - Heme-Onc recs appreciated    #DVT prophylaxis   - SCD    ID:  #COVID-19  - Concern for possible superimposed PNA   - Continue Zosyn   - S/p Remdesivir  - Blood cx from 2 days ago negative      SKIN:  #Lines:  2x PIV    Ethics:  - Full Code  - Contact: Sister Ester [phone] (unable to be reached at time of intubation) 69 year old male with a past medical history of hypertension, hyperlipemia VAZQUEZ (on CPAP at bedtime, NC 2 liters during the day), CKD stage 3, epilepsy, schizophrenia, developmental delay, metastatic testicular cancer (s/p orchiectomy, actively on chemotherapy, last dose of etoposide/cisplatin on 6/2024), presenting with acute on chronic hypoxemic respiratory failure, secondary to (a) COVID-19 infection, (b) superimposed pneumonia after recent influenza infection, and/or (c) fluid overload.     MICU consulted and accepted after RRT called earlier this AM due to increased work of breathing    NEURO:  #Mental status:  - Sedated, however, was AAOx2 prior to increased work of breathing that required intubation  - continue with fentanyl gtt, propofol    #Epilepsy:  - Continue with home meds which include Lacosamide, Lurasidone, Lamotrigine    #Brain Mets:  - MRI brain now with 5.4 mm enhancing lesion in the LEFT middle cerebellar peduncle with associated edema suspicious for metastases      CV:  #Hemodynamically stable  - Patients BP holding currently, however, is tachycardic and tachypneic   - Tachycardia most likely 2/2 inc work of breathing and underlying infection and Afib    #Echo:  Recent echo last admission showing Left ventricular cavity is normal in size. Left ventricular systolic function is normal with an ejection fraction of 62 %. There are no regional wall motion abnormalities seen. Normal right ventricular cavity size and normal right ventricular systolic function.     - Repeat TTE pending     #Afib w/RVR  - New onset Afib RVR (on tele, confirmed with EKG 2/19)   - Plan: Started metoprolol 5mg IVP q6 hours & Hep gtt  - If rate remains uncontrolled, can start Cardizem drip at 5mg/h  - c/w Heparin drip    #HTN:  - On Hydralazine 25mg TID, Amlodipine 5mg    PULM:  #Vent   - Due to increased work of breathing, now intubated    #AHRF  - Patient admitted to the hospital for AHRF, found to be COVID (+)  - Likely 2/2 PNA, does not appear to CHF decompensation  - Diuretics currently being held  - Continue with airway clearance regimen which includes duoneb, mucomyst    RENAL:  #SHAGUFTA:   - Hx of CKD stage 3, Cr 2.38 at baseline, presented with Cr 3.27, although now near baseline  - In setting of COVID (+)  - Pre-renal, FeUrea 29%  - Monitor Cr  - Avoid nephrotoxic meds  - Wakefield in place      #Electrolyte abnormalities  Hypernatremia  - Continue to monitor  - If uptrending can consider D5W  - Free water deficit 1.4  - F/u urine studies  - Nephro recs appreciated    GI:  No acute issues     #Diet: Tube feeds via OG tube    #Bowel regimen: Movantik     #Bowel Regimen  - On Senna and Miralax    ENDO:  No acute issues  Thydoid nodule noted in prior notes    HEMATOLOGIC:  #Thrombocytopenic  - Unclear origin  - Concern for possible mets to bone marrow? vs due to infection  - Continue to monitor  - Transfuse to maintain Plt>10K unless actively bleeding then maintain >50K  - Heme-Onc recs appreciated    #DVT prophylaxis   - SCD    ID:  #COVID-19  - Concern for possible superimposed PNA   - Continue Zosyn   - S/p Remdesivir  - Blood cx from 2 days ago negative    SKIN:  #Lines:  2x PIV    Ethics:  - Full Code  - Contact: Sister Ester [phone] (unable to be reached at time of intubation) 69 year old male with a past medical history of hypertension, hyperlipemia VAZQUEZ (on CPAP at bedtime, NC 2 liters during the day), CKD stage 3, epilepsy, schizophrenia, developmental delay, metastatic testicular cancer (s/p orchiectomy, actively on chemotherapy, last dose of etoposide/cisplatin on 6/2024), presenting with acute on chronic hypoxemic respiratory failure, secondary to (a) COVID-19 infection, (b) superimposed pneumonia after recent influenza infection, and/or (c) fluid overload.     MICU consulted and accepted after RRT called earlier this AM due to increased work of breathing    NEURO:  #Mental status:  - Sedated, however, was AAOx2 prior to increased work of breathing that required intubation  - continue with fentanyl gtt, propofol    #Epilepsy:  - Continue with home meds which include Lacosamide, Lurasidone, Lamotrigine    #Brain Mets:  - MRI brain now with 5.4 mm enhancing lesion in the LEFT middle cerebellar peduncle with associated edema suspicious for metastases      CV:  #Hemodynamically stable  - Patients BP holding currently, however, is tachycardic and tachypneic   - Tachycardia most likely 2/2 inc work of breathing and underlying infection and Afib    #Echo:  Recent echo last admission showing Left ventricular cavity is normal in size. Left ventricular systolic function is normal with an ejection fraction of 62 %. There are no regional wall motion abnormalities seen. Normal right ventricular cavity size and normal right ventricular systolic function.     - Repeat TTE pending     #Afib w/RVR  - New onset Afib RVR (on tele, confirmed with EKG 2/19)   - Plan: Started metoprolol 5mg IVP q6 hours & Hep gtt  - If rate remains uncontrolled, can start Cardizem drip at 5mg/h  - c/w Heparin drip    #HTN:  - On Hydralazine 25mg TID, Amlodipine 5mg    PULM:  #Vent   - Due to increased work of breathing, now intubated    #AHRF  - Patient admitted to the hospital for AHRF, found to be COVID (+)  - Likely 2/2 PNA, does not appear to CHF decompensation  - Diuretics currently being held  - Continue with airway clearance regimen which includes duoneb,     RENAL:  #SHAGUFTA:   - Hx of CKD stage 3, Cr 2.38 at baseline, presented with Cr 3.27, although now near baseline  - In setting of COVID (+)  - Pre-renal, FeUrea 29%  - Monitor Cr  - Avoid nephrotoxic meds  - Wakefield in place      #Electrolyte abnormalities  Hypernatremia  - Continue to monitor  - If uptrending can consider D5W  - Free water deficit 1.4  - F/u urine studies  - Nephro recs appreciated    GI:  No acute issues     #Diet: Tube feeds via OG tube    #Bowel regimen: Movantik     #Bowel Regimen  - On Senna and Miralax    ENDO:  No acute issues  Thydoid nodule noted in prior notes    HEMATOLOGIC:  #Thrombocytopenic  - Unclear origin  - Concern for possible mets to bone marrow? vs due to infection  - Continue to monitor  - Transfuse to maintain Plt>10K unless actively bleeding then maintain >50K  - Heme-Onc recs appreciated    #DVT prophylaxis   - SCD    ID:  #COVID-19  - Concern for possible superimposed PNA   - Continue Zosyn   - S/p Remdesivir  - Blood cx from 2 days ago negative    SKIN:  #Lines:  2x PIV    Ethics:  - Full Code  - Contact: Sister Ester [phone] (unable to be reached at time of intubation)

## 2025-02-23 NOTE — PROCEDURE NOTE - ADDITIONAL PROCEDURE DETAILS
Initially attempted exchange of existing endotracheal tube over airway exchange catheter due to concerns of cuff rupture . After old endotracheal tube was removed new endotracheal tube was loaded onto airway exchange catheter but was unable to be passed through the vocal cords.  Airway exchange catheter was removed and patient was re-intubated.

## 2025-02-23 NOTE — PROGRESS NOTE ADULT - SUBJECTIVE AND OBJECTIVE BOX
[facility] HEMATOLOGY/ONCOLOGY INPATIENT PROGRESS NOTE     Interval Hx:   02-23-25: Mr. Gr was seen at bedside today.    Meds:   MEDICATIONS  (STANDING):  acetylcysteine 20%  Inhalation 3 milliLiter(s) Inhalation every 6 hours  albuterol/ipratropium for Nebulization 3 milliLiter(s) Nebulizer every 6 hours  amLODIPine   Tablet 5 milliGRAM(s) Oral daily  atorvastatin 20 milliGRAM(s) Oral at bedtime  cholecalciferol 1000 Unit(s) Oral daily  dexAMETHasone  Injectable 6 milliGRAM(s) IV Push daily  escitalopram 15 milliGRAM(s) Oral with breakfast  gabapentin Solution 150 milliGRAM(s) Oral every 12 hours  heparin  Infusion.  Unit(s)/Hr (17 mL/Hr) IV Continuous <Continuous>  hydrALAZINE 25 milliGRAM(s) Oral three times a day  influenza  Vaccine (HIGH DOSE) 0.5 milliLiter(s) IntraMuscular once  lacosamide IVPB 200 milliGRAM(s) IV Intermittent every 12 hours  levETIRAcetam  IVPB 1000 milliGRAM(s) IV Intermittent <User Schedule>  lurasidone 40 milliGRAM(s) Oral at bedtime  metoprolol tartrate Injectable 5 milliGRAM(s) IV Push every 6 hours  pantoprazole    Tablet 40 milliGRAM(s) Oral before breakfast  piperacillin/tazobactam IVPB.. 3.375 Gram(s) IV Intermittent every 8 hours  polyethylene glycol 3350 17 Gram(s) Oral daily  senna 2 Tablet(s) Oral at bedtime    MEDICATIONS  (PRN):  acetaminophen     Tablet .. 650 milliGRAM(s) Oral every 6 hours PRN Temp greater or equal to 38C (100.4F), Mild Pain (1 - 3)  aluminum hydroxide/magnesium hydroxide/simethicone Suspension 30 milliLiter(s) Oral every 4 hours PRN Dyspepsia  heparin   Injectable 7500 Unit(s) IV Push every 6 hours PRN For aPTT less than 40  heparin   Injectable 3500 Unit(s) IV Push every 6 hours PRN For aPTT between 40 - 57  melatonin 3 milliGRAM(s) Oral at bedtime PRN Insomnia  ondansetron Injectable 4 milliGRAM(s) IV Push every 8 hours PRN Nausea and/or Vomiting    Vital Signs Last 24 Hrs  T(C): 36.4 (23 Feb 2025 03:20), Max: 37 (22 Feb 2025 19:46)  T(F): 97.6 (23 Feb 2025 03:20), Max: 98.6 (22 Feb 2025 19:46)  HR: 112 (23 Feb 2025 05:09) (75 - 136)  BP: 135/82 (23 Feb 2025 04:21) (112/72 - 175/100)  BP(mean): --  RR: 39 (23 Feb 2025 05:09) (20 - 39)  SpO2: 97% (23 Feb 2025 05:09) (95% - 99%)    Parameters below as of 23 Feb 2025 05:09  Patient On (Oxygen Delivery Method): BiPAP/CPAP    Physical Exam:  Gen: NAD  HEENT: EOMI, MMM  Chest: equal chest rise  Cardiac: regular   Abd: non distended   Neuro: AAOx3     Labs:                        8.6    8.59  )-----------( 106      ( 22 Feb 2025 06:08 )             28.3     CBC Full  -  ( 22 Feb 2025 06:08 )  WBC Count : 8.59 K/uL  RBC Count : 2.81 M/uL  Hemoglobin : 8.6 g/dL  Hematocrit : 28.3 %  Platelet Count - Automated : 106 K/uL  Mean Cell Volume : 100.7 fl  Mean Cell Hemoglobin : 30.6 pg  Mean Cell Hemoglobin Concentration : 30.4 g/dL    02-22    149[H]  |  114[H]  |  54[H]  ----------------------------<  109[H]  4.1   |  21[L]  |  2.47[H]    Ca    9.8      22 Feb 2025 06:08  Phos  4.1     02-21  Mg     2.3     02-21    TPro  6.4  /  Alb  2.9[L]  /  TBili  0.4  /  DBili  x   /  AST  50[H]  /  ALT  33  /  AlkPhos  148[H]  02-22    PT/INR - ( 22 Feb 2025 06:08 )   PT: 12.1 sec;   INR: 1.05 ratio         PTT - ( 22 Feb 2025 19:06 )  PTT:58.8 sec  Bilirubin Total: 0.4 mg/dL (02-22-25 @ 06:08)   \[facility]\"" HEMATOLOGY/ONCOLOGY INPATIENT PROGRESS NOTE     Interval Hx:   02-23-25: Mr. Gr was seen at bedside today, progressive respiratory failure, noted ongoing RTT this morning with pending intubation and transfer to MICU     Meds:   MEDICATIONS  (STANDING):  acetylcysteine 20%  Inhalation 3 milliLiter(s) Inhalation every 6 hours  albuterol/ipratropium for Nebulization 3 milliLiter(s) Nebulizer every 6 hours  amLODIPine   Tablet 5 milliGRAM(s) Oral daily  atorvastatin 20 milliGRAM(s) Oral at bedtime  cholecalciferol 1000 Unit(s) Oral daily  dexAMETHasone  Injectable 6 milliGRAM(s) IV Push daily  escitalopram 15 milliGRAM(s) Oral with breakfast  gabapentin Solution 150 milliGRAM(s) Oral every 12 hours  heparin  Infusion.  Unit(s)/Hr (17 mL/Hr) IV Continuous <Continuous>  hydrALAZINE 25 milliGRAM(s) Oral three times a day  influenza  Vaccine (HIGH DOSE) 0.5 milliLiter(s) IntraMuscular once  lacosamide IVPB 200 milliGRAM(s) IV Intermittent every 12 hours  levETIRAcetam  IVPB 1000 milliGRAM(s) IV Intermittent <User Schedule>  lurasidone 40 milliGRAM(s) Oral at bedtime  metoprolol tartrate Injectable 5 milliGRAM(s) IV Push every 6 hours  pantoprazole    Tablet 40 milliGRAM(s) Oral before breakfast  piperacillin/tazobactam IVPB.. 3.375 Gram(s) IV Intermittent every 8 hours  polyethylene glycol 3350 17 Gram(s) Oral daily  senna 2 Tablet(s) Oral at bedtime    MEDICATIONS  (PRN):  acetaminophen     Tablet .. 650 milliGRAM(s) Oral every 6 hours PRN Temp greater or equal to 38C (100.4F), Mild Pain (1 - 3)  aluminum hydroxide/magnesium hydroxide/simethicone Suspension 30 milliLiter(s) Oral every 4 hours PRN Dyspepsia  heparin   Injectable 7500 Unit(s) IV Push every 6 hours PRN For aPTT less than 40  heparin   Injectable 3500 Unit(s) IV Push every 6 hours PRN For aPTT between 40 - 57  melatonin 3 milliGRAM(s) Oral at bedtime PRN Insomnia  ondansetron Injectable 4 milliGRAM(s) IV Push every 8 hours PRN Nausea and/or Vomiting    Vital Signs Last 24 Hrs  T(C): 36.4 (23 Feb 2025 03:20), Max: 37 (22 Feb 2025 19:46)  T(F): 97.6 (23 Feb 2025 03:20), Max: 98.6 (22 Feb 2025 19:46)  HR: 112 (23 Feb 2025 05:09) (75 - 136)  BP: 135/82 (23 Feb 2025 04:21) (112/72 - 175/100)  BP(mean): --  RR: 39 (23 Feb 2025 05:09) (20 - 39)  SpO2: 97% (23 Feb 2025 05:09) (95% - 99%)    Parameters below as of 23 Feb 2025 05:09  Patient On (Oxygen Delivery Method): BiPAP/CPAP    Physical Exam:  Gen: NAD  HEENT: EOMI, MMM  Chest: equal chest rise  Cardiac: regular   Abd: non distended   Neuro: AAOx3     Labs:                        8.6    8.59  )-----------( 106      ( 22 Feb 2025 06:08 )             28.3     CBC Full  -  ( 22 Feb 2025 06:08 )  WBC Count : 8.59 K/uL  RBC Count : 2.81 M/uL  Hemoglobin : 8.6 g/dL  Hematocrit : 28.3 %  Platelet Count - Automated : 106 K/uL  Mean Cell Volume : 100.7 fl  Mean Cell Hemoglobin : 30.6 pg  Mean Cell Hemoglobin Concentration : 30.4 g/dL    02-22    149[H]  |  114[H]  |  54[H]  ----------------------------<  109[H]  4.1   |  21[L]  |  2.47[H]    Ca    9.8      22 Feb 2025 06:08  Phos  4.1     02-21  Mg     2.3     02-21    TPro  6.4  /  Alb  2.9[L]  /  TBili  0.4  /  DBili  x   /  AST  50[H]  /  ALT  33  /  AlkPhos  148[H]  02-22    PT/INR - ( 22 Feb 2025 06:08 )   PT: 12.1 sec;   INR: 1.05 ratio         PTT - ( 22 Feb 2025 19:06 )  PTT:58.8 sec  Bilirubin Total: 0.4 mg/dL (02-22-25 @ 06:08)

## 2025-02-23 NOTE — CHART NOTE - NSCHARTNOTEFT_GEN_A_CORE
Notified by RN, patient with increased sob and "twitching ' . Pt seen and evaluated at bedside. Pt tachypneic and hypoxic. RRT called . Pt intubated and transferred to CICU. Called and updated  family spoke with Ester ( sister ).      Eduarda Ruiz NP  Department of Medicine   Spectra [phone]

## 2025-02-24 LAB
ALBUMIN SERPL ELPH-MCNC: 2.5 G/DL — LOW (ref 3.3–5)
ALBUMIN SERPL ELPH-MCNC: 2.6 G/DL — LOW (ref 3.3–5)
ALP SERPL-CCNC: 119 U/L — SIGNIFICANT CHANGE UP (ref 40–120)
ALP SERPL-CCNC: 120 U/L — SIGNIFICANT CHANGE UP (ref 40–120)
ALT FLD-CCNC: 24 U/L — SIGNIFICANT CHANGE UP (ref 10–45)
ALT FLD-CCNC: 26 U/L — SIGNIFICANT CHANGE UP (ref 10–45)
AMORPH CRY # UR COMP ASSIST: PRESENT
ANION GAP SERPL CALC-SCNC: 15 MMOL/L — SIGNIFICANT CHANGE UP (ref 5–17)
ANION GAP SERPL CALC-SCNC: 17 MMOL/L — SIGNIFICANT CHANGE UP (ref 5–17)
APPEARANCE UR: ABNORMAL
APTT BLD: 93.4 SEC — HIGH (ref 24.5–35.6)
AST SERPL-CCNC: 28 U/L — SIGNIFICANT CHANGE UP (ref 10–40)
AST SERPL-CCNC: 32 U/L — SIGNIFICANT CHANGE UP (ref 10–40)
BACTERIA # UR AUTO: NEGATIVE /HPF — SIGNIFICANT CHANGE UP
BILIRUB SERPL-MCNC: 0.4 MG/DL — SIGNIFICANT CHANGE UP (ref 0.2–1.2)
BILIRUB SERPL-MCNC: 0.6 MG/DL — SIGNIFICANT CHANGE UP (ref 0.2–1.2)
BILIRUB UR-MCNC: NEGATIVE — SIGNIFICANT CHANGE UP
BUN SERPL-MCNC: 63 MG/DL — HIGH (ref 7–23)
BUN SERPL-MCNC: 64 MG/DL — HIGH (ref 7–23)
CALCIUM SERPL-MCNC: 9.2 MG/DL — SIGNIFICANT CHANGE UP (ref 8.4–10.5)
CALCIUM SERPL-MCNC: 9.3 MG/DL — SIGNIFICANT CHANGE UP (ref 8.4–10.5)
CAST: 11 /LPF — HIGH (ref 0–4)
CHLORIDE SERPL-SCNC: 110 MMOL/L — HIGH (ref 96–108)
CHLORIDE SERPL-SCNC: 112 MMOL/L — HIGH (ref 96–108)
CO2 SERPL-SCNC: 20 MMOL/L — LOW (ref 22–31)
CO2 SERPL-SCNC: 20 MMOL/L — LOW (ref 22–31)
COLOR SPEC: YELLOW — SIGNIFICANT CHANGE UP
CREAT SERPL-MCNC: 4.13 MG/DL — HIGH (ref 0.5–1.3)
CREAT SERPL-MCNC: 4.24 MG/DL — HIGH (ref 0.5–1.3)
CULTURE RESULTS: SIGNIFICANT CHANGE UP
CULTURE RESULTS: SIGNIFICANT CHANGE UP
DIFF PNL FLD: NEGATIVE — SIGNIFICANT CHANGE UP
EGFR: 15 ML/MIN/1.73M2 — LOW
GAS PNL BLDA: SIGNIFICANT CHANGE UP
GAS PNL BLDV: SIGNIFICANT CHANGE UP
GLUCOSE SERPL-MCNC: 215 MG/DL — HIGH (ref 70–99)
GLUCOSE SERPL-MCNC: 228 MG/DL — HIGH (ref 70–99)
GLUCOSE UR QL: NEGATIVE MG/DL — SIGNIFICANT CHANGE UP
KETONES UR-MCNC: NEGATIVE MG/DL — SIGNIFICANT CHANGE UP
LEUKOCYTE ESTERASE UR-ACNC: NEGATIVE — SIGNIFICANT CHANGE UP
MAGNESIUM SERPL-MCNC: 2.3 MG/DL — SIGNIFICANT CHANGE UP (ref 1.6–2.6)
MAGNESIUM SERPL-MCNC: 2.4 MG/DL — SIGNIFICANT CHANGE UP (ref 1.6–2.6)
NITRITE UR-MCNC: NEGATIVE — SIGNIFICANT CHANGE UP
PH UR: 5 — SIGNIFICANT CHANGE UP (ref 5–8)
PHOSPHATE SERPL-MCNC: 4.5 MG/DL — SIGNIFICANT CHANGE UP (ref 2.5–4.5)
PHOSPHATE SERPL-MCNC: 4.7 MG/DL — HIGH (ref 2.5–4.5)
POTASSIUM SERPL-MCNC: 4.1 MMOL/L — SIGNIFICANT CHANGE UP (ref 3.5–5.3)
POTASSIUM SERPL-MCNC: 4.4 MMOL/L — SIGNIFICANT CHANGE UP (ref 3.5–5.3)
POTASSIUM SERPL-SCNC: 4.1 MMOL/L — SIGNIFICANT CHANGE UP (ref 3.5–5.3)
POTASSIUM SERPL-SCNC: 4.4 MMOL/L — SIGNIFICANT CHANGE UP (ref 3.5–5.3)
PROT SERPL-MCNC: 5.8 G/DL — LOW (ref 6–8.3)
PROT SERPL-MCNC: 6.1 G/DL — SIGNIFICANT CHANGE UP (ref 6–8.3)
PROT UR-MCNC: 100 MG/DL
RBC CASTS # UR COMP ASSIST: 2 /HPF — SIGNIFICANT CHANGE UP (ref 0–4)
REVIEW: SIGNIFICANT CHANGE UP
SODIUM SERPL-SCNC: 147 MMOL/L — HIGH (ref 135–145)
SODIUM SERPL-SCNC: 147 MMOL/L — HIGH (ref 135–145)
SP GR SPEC: 1.02 — SIGNIFICANT CHANGE UP (ref 1–1.03)
SPECIMEN SOURCE: SIGNIFICANT CHANGE UP
SPECIMEN SOURCE: SIGNIFICANT CHANGE UP
SQUAMOUS # UR AUTO: >36 /HPF — HIGH (ref 0–5)
UROBILINOGEN FLD QL: 0.2 MG/DL — SIGNIFICANT CHANGE UP (ref 0.2–1)
WBC UR QL: 75 /HPF — HIGH (ref 0–5)

## 2025-02-24 PROCEDURE — 99291 CRITICAL CARE FIRST HOUR: CPT

## 2025-02-24 RX ORDER — NOREPINEPHRINE BITARTRATE 8 MG
0.05 SOLUTION INTRAVENOUS
Qty: 8 | Refills: 0 | Status: DISCONTINUED | OUTPATIENT
Start: 2025-02-24 | End: 2025-02-25

## 2025-02-24 RX ORDER — MIDODRINE HYDROCHLORIDE 5 MG/1
10 TABLET ORAL EVERY 8 HOURS
Refills: 0 | Status: DISCONTINUED | OUTPATIENT
Start: 2025-02-24 | End: 2025-02-25

## 2025-02-24 RX ORDER — SALINE 7; 19 G/118ML; G/118ML
1 ENEMA RECTAL ONCE
Refills: 0 | Status: COMPLETED | OUTPATIENT
Start: 2025-02-24 | End: 2025-02-24

## 2025-02-24 RX ORDER — BUMETANIDE 1 MG/1
4 TABLET ORAL ONCE
Refills: 0 | Status: COMPLETED | OUTPATIENT
Start: 2025-02-24 | End: 2025-02-24

## 2025-02-24 RX ORDER — LAMOTRIGINE 150 MG/1
25 TABLET ORAL
Refills: 0 | Status: DISCONTINUED | OUTPATIENT
Start: 2025-02-24 | End: 2025-03-02

## 2025-02-24 RX ORDER — BUMETANIDE 1 MG/1
2 TABLET ORAL ONCE
Refills: 0 | Status: COMPLETED | OUTPATIENT
Start: 2025-02-24 | End: 2025-02-24

## 2025-02-24 RX ORDER — SODIUM CHLORIDE 9 G/1000ML
1000 INJECTION, SOLUTION INTRAVENOUS
Refills: 0 | Status: DISCONTINUED | OUTPATIENT
Start: 2025-02-24 | End: 2025-02-24

## 2025-02-24 RX ORDER — BUMETANIDE 1 MG/1
1 TABLET ORAL
Qty: 20 | Refills: 0 | Status: DISCONTINUED | OUTPATIENT
Start: 2025-02-24 | End: 2025-02-26

## 2025-02-24 RX ADMIN — LURASIDONE HYDROCHLORIDE 40 MILLIGRAM(S): 120 TABLET, FILM COATED ORAL at 21:23

## 2025-02-24 RX ADMIN — IPRATROPIUM BROMIDE AND ALBUTEROL SULFATE 3 MILLILITER(S): .5; 2.5 SOLUTION RESPIRATORY (INHALATION) at 17:06

## 2025-02-24 RX ADMIN — Medication 1000 UNIT(S): at 11:19

## 2025-02-24 RX ADMIN — LEVETIRACETAM 400 MILLIGRAM(S): 10 INJECTION, SOLUTION INTRAVENOUS at 05:08

## 2025-02-24 RX ADMIN — Medication 2 TABLET(S): at 21:23

## 2025-02-24 RX ADMIN — PROPOFOL 5.62 MICROGRAM(S)/KG/MIN: 10 INJECTION, EMULSION INTRAVENOUS at 19:12

## 2025-02-24 RX ADMIN — MIDODRINE HYDROCHLORIDE 10 MILLIGRAM(S): 5 TABLET ORAL at 21:23

## 2025-02-24 RX ADMIN — Medication 25 GRAM(S): at 13:46

## 2025-02-24 RX ADMIN — ATORVASTATIN CALCIUM 20 MILLIGRAM(S): 80 TABLET, FILM COATED ORAL at 21:23

## 2025-02-24 RX ADMIN — NALOXEGOL OXALATE 12.5 MILLIGRAM(S): 12.5 TABLET, FILM COATED ORAL at 11:19

## 2025-02-24 RX ADMIN — Medication 15 MILLILITER(S): at 17:19

## 2025-02-24 RX ADMIN — GABAPENTIN 150 MILLIGRAM(S): 400 CAPSULE ORAL at 17:18

## 2025-02-24 RX ADMIN — IPRATROPIUM BROMIDE AND ALBUTEROL SULFATE 3 MILLILITER(S): .5; 2.5 SOLUTION RESPIRATORY (INHALATION) at 11:07

## 2025-02-24 RX ADMIN — Medication 25 GRAM(S): at 05:08

## 2025-02-24 RX ADMIN — BUMETANIDE 132 MILLIGRAM(S): 1 TABLET ORAL at 17:18

## 2025-02-24 RX ADMIN — LEVETIRACETAM 400 MILLIGRAM(S): 10 INJECTION, SOLUTION INTRAVENOUS at 21:24

## 2025-02-24 RX ADMIN — Medication 25 GRAM(S): at 21:23

## 2025-02-24 RX ADMIN — PROPOFOL 5.62 MICROGRAM(S)/KG/MIN: 10 INJECTION, EMULSION INTRAVENOUS at 01:34

## 2025-02-24 RX ADMIN — LEVETIRACETAM 400 MILLIGRAM(S): 10 INJECTION, SOLUTION INTRAVENOUS at 13:46

## 2025-02-24 RX ADMIN — Medication 40 MILLIGRAM(S): at 06:03

## 2025-02-24 RX ADMIN — ACETYLCYSTEINE 3 MILLILITER(S): 200 INHALANT RESPIRATORY (INHALATION) at 05:11

## 2025-02-24 RX ADMIN — GABAPENTIN 150 MILLIGRAM(S): 400 CAPSULE ORAL at 05:08

## 2025-02-24 RX ADMIN — LAMOTRIGINE 25 MILLIGRAM(S): 150 TABLET ORAL at 17:19

## 2025-02-24 RX ADMIN — DEXAMETHASONE 6 MILLIGRAM(S): 0.5 TABLET ORAL at 05:08

## 2025-02-24 RX ADMIN — ESCITALOPRAM OXALATE 15 MILLIGRAM(S): 20 TABLET ORAL at 08:00

## 2025-02-24 RX ADMIN — LACOSAMIDE 140 MILLIGRAM(S): 150 TABLET, FILM COATED ORAL at 17:19

## 2025-02-24 RX ADMIN — BUMETANIDE 10 MG/HR: 1 TABLET ORAL at 17:19

## 2025-02-24 RX ADMIN — LACOSAMIDE 140 MILLIGRAM(S): 150 TABLET, FILM COATED ORAL at 05:12

## 2025-02-24 RX ADMIN — PROPOFOL 5.62 MICROGRAM(S)/KG/MIN: 10 INJECTION, EMULSION INTRAVENOUS at 05:08

## 2025-02-24 RX ADMIN — IPRATROPIUM BROMIDE AND ALBUTEROL SULFATE 3 MILLILITER(S): .5; 2.5 SOLUTION RESPIRATORY (INHALATION) at 05:11

## 2025-02-24 RX ADMIN — Medication 15 MILLILITER(S): at 05:07

## 2025-02-24 RX ADMIN — SODIUM CHLORIDE 50 MILLILITER(S): 9 INJECTION, SOLUTION INTRAVENOUS at 11:15

## 2025-02-24 RX ADMIN — IPRATROPIUM BROMIDE AND ALBUTEROL SULFATE 3 MILLILITER(S): .5; 2.5 SOLUTION RESPIRATORY (INHALATION) at 23:07

## 2025-02-24 RX ADMIN — HEPARIN SODIUM 1500 UNIT(S)/HR: 1000 INJECTION INTRAVENOUS; SUBCUTANEOUS at 05:15

## 2025-02-24 RX ADMIN — BUMETANIDE 10 MG/HR: 1 TABLET ORAL at 19:18

## 2025-02-24 RX ADMIN — SALINE 1 ENEMA: 7; 19 ENEMA RECTAL at 10:17

## 2025-02-24 RX ADMIN — POLYETHYLENE GLYCOL 3350 17 GRAM(S): 17 POWDER, FOR SOLUTION ORAL at 11:19

## 2025-02-24 RX ADMIN — BUMETANIDE 2 MILLIGRAM(S): 1 TABLET ORAL at 09:58

## 2025-02-24 NOTE — PROGRESS NOTE ADULT - SUBJECTIVE AND OBJECTIVE BOX
[facility] HEMATOLOGY/ONCOLOGY INPATIENT PROGRESS NOTE     Interval Hx:   02-24-25: Mr. Gr was seen at bedside today.    Meds:   MEDICATIONS  (STANDING):  albuterol/ipratropium for Nebulization 3 milliLiter(s) Nebulizer every 6 hours  atorvastatin 20 milliGRAM(s) Oral at bedtime  chlorhexidine 0.12% Liquid 15 milliLiter(s) Oral Mucosa every 12 hours  cholecalciferol 1000 Unit(s) Oral daily  dexAMETHasone  Injectable 6 milliGRAM(s) IV Push daily  escitalopram 15 milliGRAM(s) Oral with breakfast  fentaNYL   Infusion.. 2 MICROgram(s)/kG/Hr (9.36 mL/Hr) IV Continuous <Continuous>  gabapentin Solution 150 milliGRAM(s) Oral every 12 hours  heparin  Infusion.  Unit(s)/Hr (17 mL/Hr) IV Continuous <Continuous>  influenza  Vaccine (HIGH DOSE) 0.5 milliLiter(s) IntraMuscular once  lacosamide IVPB 200 milliGRAM(s) IV Intermittent every 12 hours  levETIRAcetam  IVPB 1000 milliGRAM(s) IV Intermittent <User Schedule>  lurasidone 40 milliGRAM(s) Oral at bedtime  metoprolol tartrate Injectable 5 milliGRAM(s) IV Push every 6 hours  naloxegol 12.5 milliGRAM(s) Oral daily  norepinephrine Infusion 0.05 MICROgram(s)/kG/Min (8.78 mL/Hr) IV Continuous <Continuous>  pantoprazole    Tablet 40 milliGRAM(s) Oral before breakfast  piperacillin/tazobactam IVPB.. 3.375 Gram(s) IV Intermittent every 8 hours  polyethylene glycol 3350 17 Gram(s) Oral daily  propofol Infusion 10 MICROgram(s)/kG/Min (5.62 mL/Hr) IV Continuous <Continuous>  senna 2 Tablet(s) Oral at bedtime    MEDICATIONS  (PRN):  acetaminophen     Tablet .. 650 milliGRAM(s) Oral every 6 hours PRN Temp greater or equal to 38C (100.4F), Mild Pain (1 - 3)  aluminum hydroxide/magnesium hydroxide/simethicone Suspension 30 milliLiter(s) Oral every 4 hours PRN Dyspepsia  heparin   Injectable 7500 Unit(s) IV Push every 6 hours PRN For aPTT less than 40  heparin   Injectable 3500 Unit(s) IV Push every 6 hours PRN For aPTT between 40 - 57  melatonin 3 milliGRAM(s) Oral at bedtime PRN Insomnia  ondansetron Injectable 4 milliGRAM(s) IV Push every 8 hours PRN Nausea and/or Vomiting    Vital Signs Last 24 Hrs  T(C): 37.5 (24 Feb 2025 04:00), Max: 37.7 (24 Feb 2025 00:00)  T(F): 99.5 (24 Feb 2025 04:00), Max: 99.9 (24 Feb 2025 00:00)  HR: 85 (24 Feb 2025 05:22) (73 - 135)  BP: 105/56 (24 Feb 2025 04:45) (87/53 - 214/109)  BP(mean): 73 (24 Feb 2025 04:45) (65 - 151)  RR: 17 (24 Feb 2025 04:45) (16 - 57)  SpO2: 100% (24 Feb 2025 05:22) (63% - 100%)    Parameters below as of 24 Feb 2025 05:22  Patient On (Oxygen Delivery Method): ventilator    Physical Exam:  Gen: NAD  HEENT: EOMI, MMM  Chest: equal chest rise  Cardiac: regular   Abd: non distended   Neuro: AAOx3     Labs:                        7.9    8.76  )-----------( 102      ( 23 Feb 2025 22:48 )             25.9     CBC Full  -  ( 23 Feb 2025 22:48 )  WBC Count : 8.76 K/uL  RBC Count : 2.47 M/uL  Hemoglobin : 7.9 g/dL  Hematocrit : 25.9 %  Platelet Count - Automated : 102 K/uL  Mean Cell Volume : 104.9 fl  Mean Cell Hemoglobin : 32.0 pg  Mean Cell Hemoglobin Concentration : 30.5 g/dL    02-23    147[H]  |  114[H]  |  60[H]  ----------------------------<  170[H]  4.2   |  19[L]  |  3.65[H]    Ca    9.4      23 Feb 2025 22:48  Phos  4.7     02-23  Mg     2.1     02-23    TPro  5.7[L]  /  Alb  2.4[L]  /  TBili  0.4  /  DBili  x   /  AST  29  /  ALT  25  /  AlkPhos  116  02-23    PT/INR - ( 23 Feb 2025 22:48 )   PT: 13.8 sec;   INR: 1.20 ratio         PTT - ( 24 Feb 2025 04:51 )  PTT:93.4 sec  Bilirubin Total: 0.4 mg/dL (02-23-25 @ 22:48)  Ferritin: 5943 ng/mL (02-23-25 @ 06:15)   [facility] HEMATOLOGY/ONCOLOGY INPATIENT PROGRESS NOTE     Interval Hx:   02-24-25: Mr. Gr was seen at bedside today, intubated 02/23/2025, sedated, on pressor support, no signs of bleeding, counts noted, no signs of bleeding     Meds:   MEDICATIONS  (STANDING):  albuterol/ipratropium for Nebulization 3 milliLiter(s) Nebulizer every 6 hours  atorvastatin 20 milliGRAM(s) Oral at bedtime  chlorhexidine 0.12% Liquid 15 milliLiter(s) Oral Mucosa every 12 hours  cholecalciferol 1000 Unit(s) Oral daily  dexAMETHasone  Injectable 6 milliGRAM(s) IV Push daily  escitalopram 15 milliGRAM(s) Oral with breakfast  fentaNYL   Infusion.. 2 MICROgram(s)/kG/Hr (9.36 mL/Hr) IV Continuous <Continuous>  gabapentin Solution 150 milliGRAM(s) Oral every 12 hours  heparin  Infusion.  Unit(s)/Hr (17 mL/Hr) IV Continuous <Continuous>  influenza  Vaccine (HIGH DOSE) 0.5 milliLiter(s) IntraMuscular once  lacosamide IVPB 200 milliGRAM(s) IV Intermittent every 12 hours  levETIRAcetam  IVPB 1000 milliGRAM(s) IV Intermittent <User Schedule>  lurasidone 40 milliGRAM(s) Oral at bedtime  metoprolol tartrate Injectable 5 milliGRAM(s) IV Push every 6 hours  naloxegol 12.5 milliGRAM(s) Oral daily  norepinephrine Infusion 0.05 MICROgram(s)/kG/Min (8.78 mL/Hr) IV Continuous <Continuous>  pantoprazole    Tablet 40 milliGRAM(s) Oral before breakfast  piperacillin/tazobactam IVPB.. 3.375 Gram(s) IV Intermittent every 8 hours  polyethylene glycol 3350 17 Gram(s) Oral daily  propofol Infusion 10 MICROgram(s)/kG/Min (5.62 mL/Hr) IV Continuous <Continuous>  senna 2 Tablet(s) Oral at bedtime    MEDICATIONS  (PRN):  acetaminophen     Tablet .. 650 milliGRAM(s) Oral every 6 hours PRN Temp greater or equal to 38C (100.4F), Mild Pain (1 - 3)  aluminum hydroxide/magnesium hydroxide/simethicone Suspension 30 milliLiter(s) Oral every 4 hours PRN Dyspepsia  heparin   Injectable 7500 Unit(s) IV Push every 6 hours PRN For aPTT less than 40  heparin   Injectable 3500 Unit(s) IV Push every 6 hours PRN For aPTT between 40 - 57  melatonin 3 milliGRAM(s) Oral at bedtime PRN Insomnia  ondansetron Injectable 4 milliGRAM(s) IV Push every 8 hours PRN Nausea and/or Vomiting    Vital Signs Last 24 Hrs  T(C): 37.5 (24 Feb 2025 04:00), Max: 37.7 (24 Feb 2025 00:00)  T(F): 99.5 (24 Feb 2025 04:00), Max: 99.9 (24 Feb 2025 00:00)  HR: 85 (24 Feb 2025 05:22) (73 - 135)  BP: 105/56 (24 Feb 2025 04:45) (87/53 - 214/109)  BP(mean): 73 (24 Feb 2025 04:45) (65 - 151)  RR: 17 (24 Feb 2025 04:45) (16 - 57)  SpO2: 100% (24 Feb 2025 05:22) (63% - 100%)    Parameters below as of 24 Feb 2025 05:22  Patient On (Oxygen Delivery Method): ventilator    Physical Exam:  Gen: NAD  HEENT: EOMI, MMM  Chest: equal chest rise  Cardiac: regular   Abd: non distended   Neuro: AAOx3     Labs:                        7.9    8.76  )-----------( 102      ( 23 Feb 2025 22:48 )             25.9     CBC Full  -  ( 23 Feb 2025 22:48 )  WBC Count : 8.76 K/uL  RBC Count : 2.47 M/uL  Hemoglobin : 7.9 g/dL  Hematocrit : 25.9 %  Platelet Count - Automated : 102 K/uL  Mean Cell Volume : 104.9 fl  Mean Cell Hemoglobin : 32.0 pg  Mean Cell Hemoglobin Concentration : 30.5 g/dL    02-23    147[H]  |  114[H]  |  60[H]  ----------------------------<  170[H]  4.2   |  19[L]  |  3.65[H]    Ca    9.4      23 Feb 2025 22:48  Phos  4.7     02-23  Mg     2.1     02-23    TPro  5.7[L]  /  Alb  2.4[L]  /  TBili  0.4  /  DBili  x   /  AST  29  /  ALT  25  /  AlkPhos  116  02-23    PT/INR - ( 23 Feb 2025 22:48 )   PT: 13.8 sec;   INR: 1.20 ratio         PTT - ( 24 Feb 2025 04:51 )  PTT:93.4 sec  Bilirubin Total: 0.4 mg/dL (02-23-25 @ 22:48)  Ferritin: 5943 ng/mL (02-23-25 @ 06:15)   [facility] HEMATOLOGY/ONCOLOGY INPATIENT PROGRESS NOTE     Interval Hx:   02-24-25: Mr. Gr was seen at bedside today, intubated 02/23/2025, sedated, on pressor support, no signs of bleeding, counts noted, no signs of bleeding     Meds:   MEDICATIONS  (STANDING):  albuterol/ipratropium for Nebulization 3 milliLiter(s) Nebulizer every 6 hours  atorvastatin 20 milliGRAM(s) Oral at bedtime  chlorhexidine 0.12% Liquid 15 milliLiter(s) Oral Mucosa every 12 hours  cholecalciferol 1000 Unit(s) Oral daily  dexAMETHasone  Injectable 6 milliGRAM(s) IV Push daily  escitalopram 15 milliGRAM(s) Oral with breakfast  fentaNYL   Infusion.. 2 MICROgram(s)/kG/Hr (9.36 mL/Hr) IV Continuous <Continuous>  gabapentin Solution 150 milliGRAM(s) Oral every 12 hours  heparin  Infusion.  Unit(s)/Hr (17 mL/Hr) IV Continuous <Continuous>  influenza  Vaccine (HIGH DOSE) 0.5 milliLiter(s) IntraMuscular once  lacosamide IVPB 200 milliGRAM(s) IV Intermittent every 12 hours  levETIRAcetam  IVPB 1000 milliGRAM(s) IV Intermittent <User Schedule>  lurasidone 40 milliGRAM(s) Oral at bedtime  metoprolol tartrate Injectable 5 milliGRAM(s) IV Push every 6 hours  naloxegol 12.5 milliGRAM(s) Oral daily  norepinephrine Infusion 0.05 MICROgram(s)/kG/Min (8.78 mL/Hr) IV Continuous <Continuous>  pantoprazole    Tablet 40 milliGRAM(s) Oral before breakfast  piperacillin/tazobactam IVPB.. 3.375 Gram(s) IV Intermittent every 8 hours  polyethylene glycol 3350 17 Gram(s) Oral daily  propofol Infusion 10 MICROgram(s)/kG/Min (5.62 mL/Hr) IV Continuous <Continuous>  senna 2 Tablet(s) Oral at bedtime    MEDICATIONS  (PRN):  acetaminophen     Tablet .. 650 milliGRAM(s) Oral every 6 hours PRN Temp greater or equal to 38C (100.4F), Mild Pain (1 - 3)  aluminum hydroxide/magnesium hydroxide/simethicone Suspension 30 milliLiter(s) Oral every 4 hours PRN Dyspepsia  heparin   Injectable 7500 Unit(s) IV Push every 6 hours PRN For aPTT less than 40  heparin   Injectable 3500 Unit(s) IV Push every 6 hours PRN For aPTT between 40 - 57  melatonin 3 milliGRAM(s) Oral at bedtime PRN Insomnia  ondansetron Injectable 4 milliGRAM(s) IV Push every 8 hours PRN Nausea and/or Vomiting    Vital Signs Last 24 Hrs  T(C): 37.5 (24 Feb 2025 04:00), Max: 37.7 (24 Feb 2025 00:00)  T(F): 99.5 (24 Feb 2025 04:00), Max: 99.9 (24 Feb 2025 00:00)  HR: 85 (24 Feb 2025 05:22) (73 - 135)  BP: 105/56 (24 Feb 2025 04:45) (87/53 - 214/109)  BP(mean): 73 (24 Feb 2025 04:45) (65 - 151)  RR: 17 (24 Feb 2025 04:45) (16 - 57)  SpO2: 100% (24 Feb 2025 05:22) (63% - 100%)    Parameters below as of 24 Feb 2025 05:22  Patient On (Oxygen Delivery Method): ventilator    Physical Exam:  Gen: Intubated  HEENT: EOMI, MMM  Chest: equal chest rise  Cardiac: regular   Abd: non distended   Neuro: sedated     Labs:                        7.9    8.76  )-----------( 102      ( 23 Feb 2025 22:48 )             25.9     CBC Full  -  ( 23 Feb 2025 22:48 )  WBC Count : 8.76 K/uL  RBC Count : 2.47 M/uL  Hemoglobin : 7.9 g/dL  Hematocrit : 25.9 %  Platelet Count - Automated : 102 K/uL  Mean Cell Volume : 104.9 fl  Mean Cell Hemoglobin : 32.0 pg  Mean Cell Hemoglobin Concentration : 30.5 g/dL    02-23    147[H]  |  114[H]  |  60[H]  ----------------------------<  170[H]  4.2   |  19[L]  |  3.65[H]    Ca    9.4      23 Feb 2025 22:48  Phos  4.7     02-23  Mg     2.1     02-23    TPro  5.7[L]  /  Alb  2.4[L]  /  TBili  0.4  /  DBili  x   /  AST  29  /  ALT  25  /  AlkPhos  116  02-23    PT/INR - ( 23 Feb 2025 22:48 )   PT: 13.8 sec;   INR: 1.20 ratio         PTT - ( 24 Feb 2025 04:51 )  PTT:93.4 sec  Bilirubin Total: 0.4 mg/dL (02-23-25 @ 22:48)  Ferritin: 5943 ng/mL (02-23-25 @ 06:15)

## 2025-02-24 NOTE — PROGRESS NOTE ADULT - ASSESSMENT
Mr. Gr is a 69 year old male with PMHx of seizure disorder, sleep apnea, HTN, chronic idiopathic constipation, stage III CKD, severe obesity, secondary hyperparathyroidism, anemia, thrombocytopenia, hyperlipidemia, testicular cancer under treatment with Dr. Ovalles who is admitted for acute hypoxic respiratory failure secondary to COVID vs superimposed pneumonia with new onset thrombocytopenia. He was recently discharged 02/06/2025 after a tenous stay including intubation in the ICU for respiratory failure in setting of seizure. He had recovered and was discharged to rehab.      Former smoker, quit 14y prior, denied ETOH, drug use. Lives at home. Does not have children. Denies family history of blood disorders or malignancy.  Denies previous workplace related exposures.  Denies previous history of blood transfusions.  Denied history of thromboses.  Denied history of  requiring anticoagulation.     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  Margins were negative.  pT3Nx. CT C/A/P with few left para-aortic and left iliac chain lymph nodes largest measuring 8.1 cm left para-aortic node, bilateral subcentimeter noncalcified pulmonary nodules measuring up to 7 mm. AFP, LDH, hCG were all elevated. 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 Started on adjuvant therapy with Cisplatin+Etoposide, so far completed 4 cycles of treatment with cisplatin dose reduced 50% and Etoposide 50% due to thrombocytopenia.      02/19/2025: on HFNC, noted tachycardia and fever, Klebsiella in urine as well, thrombocytopenia stable/improving, no signs of active bleeding     02/20/2025: developed A.fib with RVR and was placed on heparin drip and pending cardiology eval    02/21/2025: awake, on AVAPS overnight, noted RRT for hypoxia as pt removed HFNC at some point in time, good response thereafter     02/22/2025:  continues on AVAPS this morning, noted RRT overnight for hypoxia, hypercapnia, ICU following, US LE negative for DVT, counts overall stable/improved     02/23/2025: progressive respiratory failure, noted ongoing RTT this morning with pending intubation and transfer to MICU       #Acute hypoxic respiratory failure  # COVID  - CT noted with bilateral GGO  - ID following  - Pulmonary following  - Antibiotics per primary team and ID   - Intubated 02/23/2025 AM, MICU thereafter     # Thrombocytopenia   - Did occur during most recent admission and improved to normal prior to discharge   - Without signs of bleeding  - Could be multifactorial, possibly due to infection   - Continue to trend  - Transfuse to maintain Plt > 10k unless actively bleeding then maintain > 50k     # Brain lesion  - pt with hx of seizures  - MRI brain now with 5.4 mm enhancing lesion in the LEFT middle cerebellar peduncle with associated edema suspicious for metastases  - MRI Lumbar negative for acute disease  - Small lesion without mass effect or edema, recommended to correlate per neurology if foci and locus are concordant with seizure activity  - Neurology recs noted, new lesion not likely to cause seizure  - It is rare, but nonetheless not impossible, for testicular cancer to be metastatic to the brain  - He will need another PET-CT, can be completed outpatient once stable if concern can proceed with biopsy at that time   - Previous endocrinology eval for thyroid nodule noted  - AFP/[facility] normal,  previously      # 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 Ovalles, agree with current plan  - Follow up as outpatient with Franki Ovalles MD     # Acute kidney injury on CKD Stage IIIA  - Likely multifactorial  - Nephrology consult and recs noted  - Continue to trend     # Normocytic anemia  - Chronic, has hx of PRBC during chemo 07/2024  - Noted Anti E, Anti-K Anti-C antibodies previously  - Monitor for bleeding  - Recommend to transfuse to maintain Hb > 7    # Klebsiella UTI  -Antibiotics per ID and primary team       Recommendations  - Trend with CBC daily   - Transfuse to maintain Hb > 7   - Transfuse to maintain Plt >10k unless active bleeding then >50k   - Monitor renal function  - Antibiotics per primary team/MICU and ID   - Cardiology follow up to address anticoagulation, currently on heparin drip   - f/u ICU recs         Thank you for allowing me to participate in the care of Mr. Gr please do not hesitate to call or text me if you have further questions or concerns.     Brayan Mart MD  [facility] [place]   Division of Hematology/Oncology  [facility], [address]  [place] [address]  P: [phone]  F: [phone]    Attestation:    ----Pt evaluated including face-to-face interaction in addition to chart review, reviewing treatment plan, and managing the patient’s chronic diagnoses as listed in the assessment----        Mr. Gr is a 69 year old male with PMHx of seizure disorder, sleep apnea, HTN, chronic idiopathic constipation, stage III CKD, severe obesity, secondary hyperparathyroidism, anemia, thrombocytopenia, hyperlipidemia, testicular cancer under treatment with Dr. Ovalles who is admitted for acute hypoxic respiratory failure secondary to COVID vs superimposed pneumonia with new onset thrombocytopenia. He was recently discharged 02/06/2025 after a tenous stay including intubation in the ICU for respiratory failure in setting of seizure. He had recovered and was discharged to rehab.      Former smoker, quit 14y prior, denied ETOH, drug use. Lives at home. Does not have children. Denies family history of blood disorders or malignancy.  Denies previous workplace related exposures.  Denies previous history of blood transfusions.  Denied history of thromboses.  Denied history of  requiring anticoagulation.     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  Margins were negative.  pT3Nx. CT C/A/P with few left para-aortic and left iliac chain lymph nodes largest measuring 8.1 cm left para-aortic node, bilateral subcentimeter noncalcified pulmonary nodules measuring up to 7 mm. AFP, LDH, hCG were all elevated. 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 Started on adjuvant therapy with Cisplatin+Etoposide, so far completed 4 cycles of treatment with cisplatin dose reduced 50% and Etoposide 50% due to thrombocytopenia.      02/19/2025: on HFNC, noted tachycardia and fever, Klebsiella in urine as well, thrombocytopenia stable/improving, no signs of active bleeding     02/20/2025: developed A.fib with RVR and was placed on heparin drip and pending cardiology eval    02/21/2025: awake, on AVAPS overnight, noted RRT for hypoxia as pt removed HFNC at some point in time, good response thereafter     02/22/2025:  continues on AVAPS this morning, noted RRT overnight for hypoxia, hypercapnia, ICU following, US LE negative for DVT, counts overall stable/improved     02/23/2025: progressive respiratory failure, noted ongoing RTT this morning with pending intubation and transfer to MICU     02/24/2025: intubated 02/23/2025, sedated, on pressor support, no signs of bleeding, counts noted, no signs of bleeding       #Acute hypoxic respiratory failure  # COVID  - CT noted with bilateral GGO  - ID following  - Pulmonary following  - Antibiotics per primary team and ID   - Intubated 02/23/2025 AM, MICU     # Thrombocytopenia   - Did occur during most recent admission and improved to normal prior to discharge   - Without signs of bleeding  - Could be multifactorial, possibly due to infection   - Continue to trend  - Transfuse to maintain Plt > 10k unless actively bleeding then maintain > 50k     # Brain lesion  - pt with hx of seizures  - MRI brain now with 5.4 mm enhancing lesion in the LEFT middle cerebellar peduncle with associated edema suspicious for metastases  - MRI Lumbar negative for acute disease  - Small lesion without mass effect or edema, recommended to correlate per neurology if foci and locus are concordant with seizure activity  - Neurology recs noted, new lesion not likely to cause seizure  - It is rare, but nonetheless not impossible, for testicular cancer to be metastatic to the brain  - He will need another PET-CT, can be completed outpatient once stable if concern can proceed with biopsy at that time   - Previous endocrinology eval for thyroid nodule noted  - AFP/[facility] normal,  previously      # 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 Ovalles, agree with current plan  - Follow up as outpatient with Franki Ovalles MD     # Acute kidney injury on CKD Stage IIIA  - Likely multifactorial  - Nephrology consult and recs noted  - Continue to trend     # Normocytic anemia  - Chronic, has hx of PRBC during chemo 07/2024  - Noted Anti E, Anti-K Anti-C antibodies previously  - Monitor for bleeding  - Recommend to transfuse to maintain Hb > 7    # Klebsiella UTI  -Antibiotics per ID and primary team       Recommendations  - Trend with CBC daily   - Transfuse to maintain Hb > 7   - Transfuse to maintain Plt >10k unless active bleeding then >50k   - Monitor renal function  - Antibiotics per primary team/MICU and ID   - Cardiology follow up to address anticoagulation, currently on heparin drip   - f/u ICU recs         Thank you for allowing me to participate in the care of Mr. Gr please do not hesitate to call or text me if you have further questions or concerns.     Brayan Mart MD  [facility] [place]   Division of Hematology/Oncology  [address] [place] [address], [address]  [place] [address]  P: [phone]  F: [phone]    Attestation:    ----Pt evaluated including face-to-face interaction in addition to chart review, reviewing treatment plan, and managing the patient’s chronic diagnoses as listed in the assessment----

## 2025-02-24 NOTE — PROGRESS NOTE ADULT - SUBJECTIVE AND OBJECTIVE BOX
[facility]   [phone]  RENAL FOLLOW UP NOTE  --------------------------------------------------------------------------------  Chief Complaint:    24 hour events/subjective:    seen earlier   intubated       PAST HISTORY  --------------------------------------------------------------------------------  No significant changes to PMH, PSH, FHx, SHx, unless otherwise noted    ALLERGIES & MEDICATIONS  --------------------------------------------------------------------------------  Allergies    seasonal allergies (Unknown)  penicillin (Hives)    Intolerances    latex (Rash)    Standing Inpatient Medications  albuterol/ipratropium for Nebulization 3 milliLiter(s) Nebulizer every 6 hours  atorvastatin 20 milliGRAM(s) Oral at bedtime  buMETAnide Infusion 2 mG/Hr IV Continuous <Continuous>  chlorhexidine 0.12% Liquid 15 milliLiter(s) Oral Mucosa every 12 hours  cholecalciferol 1000 Unit(s) Oral daily  dexAMETHasone  Injectable 6 milliGRAM(s) IV Push daily  escitalopram 15 milliGRAM(s) Oral with breakfast  gabapentin Solution 150 milliGRAM(s) Oral every 12 hours  heparin  Infusion.  Unit(s)/Hr IV Continuous <Continuous>  influenza  Vaccine (HIGH DOSE) 0.5 milliLiter(s) IntraMuscular once  lacosamide IVPB 200 milliGRAM(s) IV Intermittent every 12 hours  lamoTRIgine 25 milliGRAM(s) Oral two times a day  levETIRAcetam  IVPB 1000 milliGRAM(s) IV Intermittent <User Schedule>  lurasidone 40 milliGRAM(s) Oral at bedtime  midodrine 10 milliGRAM(s) Oral every 8 hours  naloxegol 12.5 milliGRAM(s) Oral daily  norepinephrine Infusion 0.05 MICROgram(s)/kG/Min IV Continuous <Continuous>  pantoprazole    Tablet 40 milliGRAM(s) Oral before breakfast  piperacillin/tazobactam IVPB.. 3.375 Gram(s) IV Intermittent every 8 hours  polyethylene glycol 3350 17 Gram(s) Oral daily  propofol Infusion 10 MICROgram(s)/kG/Min IV Continuous <Continuous>  senna 2 Tablet(s) Oral at bedtime    PRN Inpatient Medications  acetaminophen     Tablet .. 650 milliGRAM(s) Oral every 6 hours PRN  aluminum hydroxide/magnesium hydroxide/simethicone Suspension 30 milliLiter(s) Oral every 4 hours PRN  melatonin 3 milliGRAM(s) Oral at bedtime PRN      REVIEW OF SYSTEMS  --------------------------------------------------------------------------------        VITALS/PHYSICAL EXAM  --------------------------------------------------------------------------------  T(C): 37.1 (02-24-25 @ 19:00), Max: 37.7 (02-24-25 @ 00:00)  HR: 83 (02-24-25 @ 19:45) (80 - 102)  BP: 99/55 (02-24-25 @ 19:45) (82/47 - 134/66)  RR: 19 (02-24-25 @ 19:45) (16 - 29)  SpO2: 94% (02-24-25 @ 19:45) (91% - 100%)  Wt(kg): --        02-23-25 @ 07:01  -  02-24-25 @ 07:00  --------------------------------------------------------  IN: 3546.1 mL / OUT: 295 mL / NET: 3251.1 mL    02-24-25 @ 07:01  -  02-24-25 @ 20:21  --------------------------------------------------------  IN: 1690.5 mL / OUT: 470 mL / NET: 1220.5 mL      Physical Exam:  	  Gen: ill appearing male, intubated   	Pulm: decrease bs, +coarse   	CV: No JVD. RRR, S1S2; no rub  	Abd: +BS, soft, nontender/nondistended  	: No suprapubic tenderness, external catheter  	UE: Warm, no cyanosis  no clubbing,  no edema;  	LE: Warm, no cyanosis  no clubbing, no edema    LABS/STUDIES  --------------------------------------------------------------------------------              7.9    8.76  >-----------<  102      [02-23-25 @ 22:48]              25.9     147  |  112  |  64  ----------------------------<  215      [02-24-25 @ 15:24]  4.1   |  20  |  4.24        Ca     9.3     [02-24-25 @ 15:24]      Mg     2.3     [02-24-25 @ 15:24]      Phos  4.5     [02-24-25 @ 15:24]    TPro  5.8  /  Alb  2.5  /  TBili  0.4  /  DBili  x   /  AST  32  /  ALT  24  /  AlkPhos  119  [02-24-25 @ 15:24]    PT/INR: PT 13.8 , INR 1.20       [02-23-25 @ 22:48]  PTT: 93.4       [02-24-25 @ 04:51]          [02-23-25 @ 06:11]    Creatinine Trend:  SCr 4.24 [02-24 @ 15:24]  SCr 4.13 [02-24 @ 11:38]  SCr 3.65 [02-23 @ 22:48]  SCr 2.64 [02-23 @ 06:11]  SCr 2.47 [02-22 @ 06:08]            Ferritin [id number]      [02-23-25 @ 06:15]  TSH 0.87      [01-25-25 @ 11:31]

## 2025-02-24 NOTE — DIETITIAN INITIAL EVALUATION ADULT - OTHER INFO
- Intubated, sedated on Precedex and Propofol (currently 19.5mL/hr; x 24hr would provide 515kcal from lipid)  - Norepinephrine @ 0.05mcgs/kg/min  - Receiving Decadron which can increase BG levels    Weight Hx:  - Per previous RD note pt's admission weight was 223.3lbs (1/21/25) however later weight noted at 205.3lbs (1/27/25). Dosing weight this admission 206.3lbs (2/19), current weight 195.7lbs (2/23). Pt previously noted as edematous per RD note, weight shifts likely secondary to fluid loss and inadequate protein-energy intake (dx with severe acute malnutrition on 1/28). Pt with possible 4.8% wt loss x 1 month based on 1/27 wt - clinically significant, will continue to monitor/trend.

## 2025-02-24 NOTE — DIETITIAN INITIAL EVALUATION ADULT - ENERGY INTAKE
Current TF regimen provides: 720mL volume, 1080kcal, 46g protein, 547mL free water.  Enteral nutrition provision per flowsheets: 500mL over past 24hr.    ***Prior to MICU transfer/intubation, per flowsheets pt with poor PO intake since admission typically consuming <50% of meals.

## 2025-02-24 NOTE — PROGRESS NOTE ADULT - SUBJECTIVE AND OBJECTIVE BOX
DATE OF SERVICE: 02-24-25 @ 16:29    Patient is a 69y old  Male who presents with a chief complaint of Acute on chronic hypoxemic respiratory failure (24 Feb 2025 10:47)      INTERVAL HISTORY: intubated and sedated    REVIEW OF SYSTEMS: Unable to participate in ROS  CONSTITUTIONAL: No weakness  EYES/ENT: No visual changes;  No throat pain   NECK: No pain or stiffness  RESPIRATORY: No cough, wheezing; No shortness of breath  CARDIOVASCULAR: No chest pain or palpitations  GASTROINTESTINAL: No abdominal  pain. No nausea, vomiting, or hematemesis  GENITOURINARY: No dysuria, frequency or hematuria  NEUROLOGICAL: No stroke like symptoms  SKIN: No rashes    TELEMETRY Personally reviewed: SR/ST 90s-100s  	  MEDICATIONS:  buMETAnide Infusion 2 mG/Hr IV Continuous <Continuous>  buMETAnide IVPB 4 milliGRAM(s) IV Intermittent once  metoprolol tartrate Injectable 5 milliGRAM(s) IV Push every 6 hours  norepinephrine Infusion 0.05 MICROgram(s)/kG/Min IV Continuous <Continuous>        PHYSICAL EXAM:  T(C): 37.4 (02-24-25 @ 16:00), Max: 37.7 (02-24-25 @ 00:00)  HR: 98 (02-24-25 @ 16:00) (73 - 102)  BP: 98/53 (02-24-25 @ 16:00) (83/49 - 134/66)  RR: 20 (02-24-25 @ 16:00) (16 - 29)  SpO2: 94% (02-24-25 @ 16:00) (91% - 100%)  Wt(kg): --  I&O's Summary    23 Feb 2025 07:01  -  24 Feb 2025 07:00  --------------------------------------------------------  IN: 3546.1 mL / OUT: 295 mL / NET: 3251.1 mL    24 Feb 2025 07:01  -  24 Feb 2025 16:29  --------------------------------------------------------  IN: 322.2 mL / OUT: 320 mL / NET: 2.2 mL          Appearance: In no distress	  HEENT:    PERRL, EOMI	  Cardiovascular:  S1 S2, No JVD  Respiratory: Lungs clear to auscultation	  Gastrointestinal:  Soft, Non-tender, + BS	  Vascularature:  No edema of LE  Psychiatric: Appropriate affect   Neuro: no acute focal deficits                               7.9    8.76  )-----------( 102      ( 23 Feb 2025 22:48 )             25.9     02-24    147[H]  |  112[H]  |  64[H]  ----------------------------<  215[H]  4.1   |  20[L]  |  4.24[H]    Ca    9.3      24 Feb 2025 15:24  Phos  4.5     02-24  Mg     2.3     02-24    TPro  5.8[L]  /  Alb  2.5[L]  /  TBili  0.4  /  DBili  x   /  AST  32  /  ALT  24  /  AlkPhos  119  02-24        Labs personally reviewed      ASSESSMENT/PLAN: 	    69 year old male with a past medical history of hypertension, hyperlipemia VAZQUEZ (on CPAP at bedtime, NC 2 liters during the day), CKD stage 3, epilepsy, schizophrenia, developmental delay, metastatic testicular cancer (s/p orchiectomy, actively on chemotherapy, last dose of etoposide/cisplatin on 6/2024), presenting with acute on chronic hypoxemic respiratory failure, secondary to (a) COVID-19 infection, (b) superimposed pneumonia after recent influenza infection, and/or (c) fluid overload.     Problem/Plan - 1:  ·  Problem: Acute on chronic respiratory failure with hypoxia.   ·  Plan: Likely 2/2 PNA, does not appear to be in decompensated HF  - Agree with holding diuresis   - Appreciate pulmonology.  - Transferred to MICU for hypoxia. Appreciate MICU care.     Problem/Plan - 2:  ·  Problem: SHAGUFTA (acute kidney injury).   ·  Plan: Has a history of CKD stage 3, Cr 2.38 at baseline. Presents with Cr 3.27. Likely pre-renal state  - Appreciate nephrology.    Problem/Plan - 3:  ·  Problem: Chronic heart failure with preserved ejection fraction.   ·  Plan: TTE done here 1/17/25 technically difficult, but LV systolic fxn appears nl without any regional wall motion abnormalities  - Repeat TTE pending  - BNP 5000 <---1100 but appears euvolemic   - Hold off diuretics   - 2/24 decreased UOP and net positive, bumex gtt started at 2mg/hr for goal -1-2 L,  per MICU     Problem/Plan - 4:  ·  Problem: New onset Afib RVR (on tele, confirmed with EKG 2/19)   ·  Plan: Started metoprolol 5mg IVP q6 hours & Hep gtt  - If rate remains uncontrolled, can start Cardizem drip at 5mg/h  - Initiated hep gtt and will transition to Eliquis     Problem/Plan - 5:  ·  Problem: HTN    ·  Plan: Start Hydral 25mg TID & amlodipine 5mg for uncontrolled BP (2/19)  - improved  - Hold BP meds for shock state. Can resume when improved.     Problem/Plan - 6:  ·  Problem: Prophylactic measure.   ·  Plan:  DVT prophylaxis: heparin gtt          Iolani Behrbom, AG-DARIAN Andrew DO [facility]  Cardiovascular Medicine  [address], [address]  Available through call or text on [employer]  Office: [phone]

## 2025-02-24 NOTE — PROGRESS NOTE ADULT - PROBLEM SELECTOR PLAN 1
?superimposed PNA on top of COVID-19?  - Zosyn 4.5 g q12h, renally dosed  - Vancomycin discontinued  - Decadron 6mg IVPB daily x 10 days for COVID-19   - complete Remdesivir IV started per ID.   - Maintain O2 sats above 92%  - BiPaP daytime prn   - BiPaP at bedtime   - Blood cultures x 2 (-) so far  - MRSA swab negative  - Sputum culture --> few gram variable rods and rare G (+) cocci in pairs  - Legionella Ur Ag (-)  - Streptococcus pneumoniae testing  - Avoiding diuresis at this time due to hypotension at presentation, as well as SHAGUFTA, but will trial if methods above do not work  - Appreciate pulmonology

## 2025-02-24 NOTE — PROGRESS NOTE ADULT - SUBJECTIVE AND OBJECTIVE BOX
SUBJECTIVE/ OVERNIGHT EVENTS:  seen in ICU  sedated, intubated  events reviewed.    --------------------------------------------------------------------------------------------  LABS:                        7.9    8.76  )-----------( 102      ( 23 Feb 2025 22:48 )             25.9     02-24    147[H]  |  110[H]  |  63[H]  ----------------------------<  228[H]  4.4   |  20[L]  |  4.13[H]    Ca    9.2      24 Feb 2025 11:38  Phos  4.7     02-24  Mg     2.4     02-24    TPro  6.1  /  Alb  2.6[L]  /  TBili  0.6  /  DBili  x   /  AST  28  /  ALT  26  /  AlkPhos  120  02-24    PT/INR - ( 23 Feb 2025 22:48 )   PT: 13.8 sec;   INR: 1.20 ratio         PTT - ( 24 Feb 2025 04:51 )  PTT:93.4 sec  CAPILLARY BLOOD GLUCOSE            Urinalysis Basic - ( 24 Feb 2025 11:38 )    Color: x / Appearance: x / SG: x / pH: x  Gluc: 228 mg/dL / Ketone: x  / Bili: x / Urobili: x   Blood: x / Protein: x / Nitrite: x   Leuk Esterase: x / RBC: x / WBC x   Sq Epi: x / Non Sq Epi: x / Bacteria: x        RADIOLOGY & ADDITIONAL TESTS:    Imaging Personally Reviewed:  [x] YES  [ ] NO    Consultant(s) Notes Reviewed:  [x] YES  [ ] NO    MEDICATIONS  (STANDING):  albuterol/ipratropium for Nebulization 3 milliLiter(s) Nebulizer every 6 hours  atorvastatin 20 milliGRAM(s) Oral at bedtime  chlorhexidine 0.12% Liquid 15 milliLiter(s) Oral Mucosa every 12 hours  cholecalciferol 1000 Unit(s) Oral daily  dexAMETHasone  Injectable 6 milliGRAM(s) IV Push daily  escitalopram 15 milliGRAM(s) Oral with breakfast  fentaNYL   Infusion.. 2 MICROgram(s)/kG/Hr (9.36 mL/Hr) IV Continuous <Continuous>  gabapentin Solution 150 milliGRAM(s) Oral every 12 hours  heparin  Infusion.  Unit(s)/Hr (17 mL/Hr) IV Continuous <Continuous>  influenza  Vaccine (HIGH DOSE) 0.5 milliLiter(s) IntraMuscular once  lacosamide IVPB 200 milliGRAM(s) IV Intermittent every 12 hours  lamoTRIgine 25 milliGRAM(s) Oral two times a day  levETIRAcetam  IVPB 1000 milliGRAM(s) IV Intermittent <User Schedule>  lurasidone 40 milliGRAM(s) Oral at bedtime  metoprolol tartrate Injectable 5 milliGRAM(s) IV Push every 6 hours  naloxegol 12.5 milliGRAM(s) Oral daily  norepinephrine Infusion 0.05 MICROgram(s)/kG/Min (8.78 mL/Hr) IV Continuous <Continuous>  pantoprazole    Tablet 40 milliGRAM(s) Oral before breakfast  piperacillin/tazobactam IVPB.. 3.375 Gram(s) IV Intermittent every 8 hours  polyethylene glycol 3350 17 Gram(s) Oral daily  propofol Infusion 10 MICROgram(s)/kG/Min (5.62 mL/Hr) IV Continuous <Continuous>  senna 2 Tablet(s) Oral at bedtime    MEDICATIONS  (PRN):  acetaminophen     Tablet .. 650 milliGRAM(s) Oral every 6 hours PRN Temp greater or equal to 38C (100.4F), Mild Pain (1 - 3)  aluminum hydroxide/magnesium hydroxide/simethicone Suspension 30 milliLiter(s) Oral every 4 hours PRN Dyspepsia  heparin   Injectable 7500 Unit(s) IV Push every 6 hours PRN For aPTT less than 40  heparin   Injectable 3500 Unit(s) IV Push every 6 hours PRN For aPTT between 40 - 57  melatonin 3 milliGRAM(s) Oral at bedtime PRN Insomnia  ondansetron Injectable 4 milliGRAM(s) IV Push every 8 hours PRN Nausea and/or Vomiting      Care Discussed with Consultants/Other Providers [x] YES  [ ] NO    Vital Signs Last 24 Hrs  T(C): 37.1 (24 Feb 2025 12:00), Max: 37.7 (24 Feb 2025 00:00)  T(F): 98.8 (24 Feb 2025 12:00), Max: 99.9 (24 Feb 2025 00:00)  HR: 101 (24 Feb 2025 14:38) (73 - 102)  BP: 107/55 (24 Feb 2025 14:00) (83/49 - 134/66)  BP(mean): 76 (24 Feb 2025 14:00) (58 - 94)  RR: 22 (24 Feb 2025 14:00) (16 - 29)  SpO2: 95% (24 Feb 2025 14:38) (91% - 100%)    Parameters below as of 24 Feb 2025 11:13  Patient On (Oxygen Delivery Method): ventilator      I&O's Summary    23 Feb 2025 07:01  -  24 Feb 2025 07:00  --------------------------------------------------------  IN: 3546.1 mL / OUT: 295 mL / NET: 3251.1 mL    24 Feb 2025 07:01  -  24 Feb 2025 14:56  --------------------------------------------------------  IN: 243.6 mL / OUT: 280 mL / NET: -36.4 mL        PHYSICAL EXAM:  GENERAL: intubated, sedated  HEAD:  Atraumatic, Normocephalic  EYES: EOMI, PERRLA, conjunctiva and sclera clear  NECK: Supple, No JVD  CHEST/LUNG: mild decrease breath sounds bilaterally; No wheeze   HEART: Regular rate and rhythm; No murmurs, rubs, or gallops  ABDOMEN: Soft, Nontender, Nondistended; Bowel sounds present  Neuro:  intubated, sedated  EXTREMITIES:  2+ Peripheral Pulses, No clubbing, cyanosis, or edema  SKIN: No rashes or lesions

## 2025-02-24 NOTE — PROGRESS NOTE ADULT - PROBLEM SELECTOR PLAN 4
TTE done here 1/17/25 technically difficult, but LV systolic fxn appears nl without any regional wall motion abnormalities  - BNP worsened this admission, but his symptoms are primarily due to COVID-19/bacterial PNA  - Strict I/Os  - Daily weights  - Repeat TTE reviewed: grossly normal LV  - Will hold off diuretics for now due to ongoing infection and SHAGUFTA  - Appreciate cardiology

## 2025-02-24 NOTE — PROGRESS NOTE ADULT - SUBJECTIVE AND OBJECTIVE BOX
Date of Service: 02-24-25 @ 16:51    Patient is a 69y old  Male who presents with a chief complaint of Acute on chronic hypoxemic respiratory failure (24 Feb 2025 16:28)      Any change in ROS: events noted:  yesterday morning pt had to be intubated secondary to increased work of breathing : currently he is intubated and sedated   and is on vasopressors      MEDICATIONS  (STANDING):  albuterol/ipratropium for Nebulization 3 milliLiter(s) Nebulizer every 6 hours  atorvastatin 20 milliGRAM(s) Oral at bedtime  buMETAnide Infusion 2 mG/Hr (10 mL/Hr) IV Continuous <Continuous>  buMETAnide IVPB 4 milliGRAM(s) IV Intermittent once  chlorhexidine 0.12% Liquid 15 milliLiter(s) Oral Mucosa every 12 hours  cholecalciferol 1000 Unit(s) Oral daily  dexAMETHasone  Injectable 6 milliGRAM(s) IV Push daily  escitalopram 15 milliGRAM(s) Oral with breakfast  fentaNYL   Infusion.. 2 MICROgram(s)/kG/Hr (9.36 mL/Hr) IV Continuous <Continuous>  gabapentin Solution 150 milliGRAM(s) Oral every 12 hours  heparin  Infusion.  Unit(s)/Hr (17 mL/Hr) IV Continuous <Continuous>  influenza  Vaccine (HIGH DOSE) 0.5 milliLiter(s) IntraMuscular once  lacosamide IVPB 200 milliGRAM(s) IV Intermittent every 12 hours  lamoTRIgine 25 milliGRAM(s) Oral two times a day  levETIRAcetam  IVPB 1000 milliGRAM(s) IV Intermittent <User Schedule>  lurasidone 40 milliGRAM(s) Oral at bedtime  metoprolol tartrate Injectable 5 milliGRAM(s) IV Push every 6 hours  naloxegol 12.5 milliGRAM(s) Oral daily  norepinephrine Infusion 0.05 MICROgram(s)/kG/Min (8.78 mL/Hr) IV Continuous <Continuous>  pantoprazole    Tablet 40 milliGRAM(s) Oral before breakfast  piperacillin/tazobactam IVPB.. 3.375 Gram(s) IV Intermittent every 8 hours  polyethylene glycol 3350 17 Gram(s) Oral daily  propofol Infusion 10 MICROgram(s)/kG/Min (5.62 mL/Hr) IV Continuous <Continuous>  senna 2 Tablet(s) Oral at bedtime    MEDICATIONS  (PRN):  acetaminophen     Tablet .. 650 milliGRAM(s) Oral every 6 hours PRN Temp greater or equal to 38C (100.4F), Mild Pain (1 - 3)  aluminum hydroxide/magnesium hydroxide/simethicone Suspension 30 milliLiter(s) Oral every 4 hours PRN Dyspepsia  heparin   Injectable 7500 Unit(s) IV Push every 6 hours PRN For aPTT less than 40  heparin   Injectable 3500 Unit(s) IV Push every 6 hours PRN For aPTT between 40 - 57  melatonin 3 milliGRAM(s) Oral at bedtime PRN Insomnia  ondansetron Injectable 4 milliGRAM(s) IV Push every 8 hours PRN Nausea and/or Vomiting    Vital Signs Last 24 Hrs  T(C): 37.4 (24 Feb 2025 16:00), Max: 37.7 (24 Feb 2025 00:00)  T(F): 99.3 (24 Feb 2025 16:00), Max: 99.9 (24 Feb 2025 00:00)  HR: 98 (24 Feb 2025 16:00) (73 - 102)  BP: 98/53 (24 Feb 2025 16:00) (83/49 - 134/66)  BP(mean): 72 (24 Feb 2025 16:00) (58 - 94)  RR: 20 (24 Feb 2025 16:00) (16 - 29)  SpO2: 94% (24 Feb 2025 16:00) (91% - 100%)    Parameters below as of 24 Feb 2025 11:13  Patient On (Oxygen Delivery Method): ventilator      Mode: AC/ CMV (Assist Control/ Continuous Mandatory Ventilation)  RR (machine): 16  TV (machine): 500  FiO2: 40  PEEP: 8  ITime: 1  MAP: 14  PIP: 35    I&O's Summary    23 Feb 2025 07:01  -  24 Feb 2025 07:00  --------------------------------------------------------  IN: 3546.1 mL / OUT: 295 mL / NET: 3251.1 mL    24 Feb 2025 07:01  -  24 Feb 2025 16:51  --------------------------------------------------------  IN: 322.2 mL / OUT: 320 mL / NET: 2.2 mL          Physical Exam:   GENERAL: NAD, well-groomed, well-developed  HEENT: BREE/   Atraumatic, Normocephalic  ENMT: No tonsillar erythema, exudates, or enlargement; Moist mucous membranes, Good dentition, No lesions  NECK: Supple, No JVD, Normal thyroid  CHEST/LUNG: Clear to auscultaion  CVS: Regular rate and rhythm; No murmurs, rubs, or gallops  GI: : Soft, Nontender, Nondistended; Bowel sounds present  NERVOUS SYSTEM:  intubated and sedated   EXTREMITIES:+ edema  LYMPH: No lymphadenopathy noted  SKIN: No rashes or lesions  ENDOCRINOLOGY: No Thyromegaly  PSYCH: intubated and sedated     Labs:  ABG - ( 24 Feb 2025 11:50 )  pH, Arterial: 7.26  pH, Blood: x     /  pCO2: 46    /  pO2: 76    / HCO3: 21    / Base Excess: -6.1  /  SaO2: 96.3            40, 100, 24, 24, 23, 17, 22                            7.9    8.76  )-----------( 102      ( 23 Feb 2025 22:48 )             25.9                         8.1    10.78 )-----------( 107      ( 23 Feb 2025 16:19 )             26.4                         9.2    9.74  )-----------( 105      ( 23 Feb 2025 06:14 )             29.8                         8.6    8.59  )-----------( 106      ( 22 Feb 2025 06:08 )             28.3                         8.7    7.85  )-----------( 102      ( 21 Feb 2025 19:44 )             28.1                         10.6   8.68  )-----------( 114      ( 21 Feb 2025 06:30 )             34.3     02-24    147[H]  |  112[H]  |  64[H]  ----------------------------<  215[H]  4.1   |  20[L]  |  4.24[H]  02-24    147[H]  |  110[H]  |  63[H]  ----------------------------<  228[H]  4.4   |  20[L]  |  4.13[H]  02-23    147[H]  |  114[H]  |  60[H]  ----------------------------<  170[H]  4.2   |  19[L]  |  3.65[H]  02-23    151[H]  |  114[H]  |  54[H]  ----------------------------<  124[H]  4.1   |  21[L]  |  2.64[H]  02-22    149[H]  |  114[H]  |  54[H]  ----------------------------<  109[H]  4.1   |  21[L]  |  2.47[H]  02-21    x   |  x   |  x   ----------------------------<  x   x    |  x   |  2.54[H]  02-21    148[H]  |  113[H]  |  60[H]  ----------------------------<  172[H]  4.7   |  21[L]  |  2.44[H]  02-21    x   |  x   |  x   ----------------------------<  x   x    |  x   |  0.68    Ca    9.3      24 Feb 2025 15:24  Ca    9.2      24 Feb 2025 11:38  Ca    9.4      23 Feb 2025 22:48  Ca    10.0      23 Feb 2025 06:11  Phos  4.5     02-24  Phos  4.7     02-24  Phos  4.7     02-23  Mg     2.3     02-24  Mg     2.4     02-24  Mg     2.1     02-23    TPro  5.8[L]  /  Alb  2.5[L]  /  TBili  0.4  /  DBili  x   /  AST  32  /  ALT  24  /  AlkPhos  119  02-24  TPro  6.1  /  Alb  2.6[L]  /  TBili  0.6  /  DBili  x   /  AST  28  /  ALT  26  /  AlkPhos  120  02-24  TPro  5.7[L]  /  Alb  2.4[L]  /  TBili  0.4  /  DBili  x   /  AST  29  /  ALT  25  /  AlkPhos  116  02-23  TPro  6.4  /  Alb  2.9[L]  /  TBili  0.4  /  DBili  x   /  AST  50[H]  /  ALT  33  /  AlkPhos  148[H]  02-22  TPro  6.4  /  Alb  2.8[L]  /  TBili  0.5  /  DBili  0.2  /  AST  52[H]  /  ALT  32  /  AlkPhos  150[H]  02-21  TPro  7.0  /  Alb  3.9  /  TBili  0.6  /  DBili  0.1  /  AST  16  /  ALT  13  /  AlkPhos  59  02-21    CAPILLARY BLOOD GLUCOSE          LIVER FUNCTIONS - ( 24 Feb 2025 15:24 )  Alb: 2.5 g/dL / Pro: 5.8 g/dL / ALK PHOS: 119 U/L / ALT: 24 U/L / AST: 32 U/L / GGT: x           PT/INR - ( 23 Feb 2025 22:48 )   PT: 13.8 sec;   INR: 1.20 ratio         PTT - ( 24 Feb 2025 04:51 )  PTT:93.4 sec  Urinalysis Basic - ( 24 Feb 2025 15:24 )    Color: x / Appearance: x / SG: x / pH: x  Gluc: 215 mg/dL / Ketone: x  / Bili: x / Urobili: x   Blood: x / Protein: x / Nitrite: x   Leuk Esterase: x / RBC: x / WBC x   Sq Epi: x / Non Sq Epi: x / Bacteria: x      D-Dimer Assay, Quantitative: 2027 ng/mL DDU (02-23 @ 06:14)        RECENT CULTURES:  02-21 @ 19:36 .Blood Blood         rad< from: Xray Chest 1 View- PORTABLE-Urgent (Xray Chest 1 View- PORTABLE-Urgent .) (02.23.25 @ 09:25) >  Endotracheal tube is within the mid trachea.  Enteric tube with tip below the diaphragm out of the field of view.  Right-sided port with its tip in the right atrium.  The heart is not accurately assessed in this AP projection.  Low lung volumes. Unchanged multifocal airspace opacities.  There is no pneumothorax or large pleural effusion.  No acute bony abnormality. Spinal fixation hardware in place.    IMPRESSION:  Tubes as above.  Bilateral airspace opacities unchanged    --- End of Report ---          ISIAH GIBBONS MD; Resident Radiologist  This document has been electronically signed.  MARIA G ARZATE MD; Attending Radiologist  This document has been electronically signed. Feb 23 2025  1:03PM    < end of copied text >         No growth at 48 Hours    02-21 @ 19:35 .Blood Blood                No growth at 48 Hours    02-19 @ 00:18 .Blood Blood-Peripheral                No growth at 5 days    02-17 @ 22:23 .Sputum Sputum       Few polymorphonuclear leukocytes per low power field  No Squamous epithelial cells per low power field  Few Gram Variable Rods seen per oil power field  Rare Gram positive cocci in pairs seen per oil power field           Commensal lucia consistent with body site          RESPIRATORY CULTURES:          Studies  Chest X-RAY  CT SCAN Chest   Venous Dopplers: LE:   CT Abdomen  Others

## 2025-02-24 NOTE — PROGRESS NOTE ADULT - ASSESSMENT
68 y/o M with PMH of HTN, HLD, VAZQUEZ on CPAP and home O2 (2LNC daytime), CKD, epilepsy, schizophrenia developmental delay, metastatic testicular CA. Recent admission at [facility] for acute respiratory failure in the setting of seizures, influenza infection, pulmonary edema, PNA requiring intubation in MICU. Was eventually discharged to rehab. Presents now with SOB, found to be COVID + at facility on 2/13. Febrile on admission to ED to 101.9F. Placed on Bipap for increased WOB. Pulmonary called to consult for acute respiratory failure.

## 2025-02-24 NOTE — DIETITIAN NUTRITION RISK NOTIFICATION - TREATMENT: THE FOLLOWING DIET HAS BEEN RECOMMENDED
Diet, NPO with Tube Feed:   Tube Feeding Modality: Orogastric  Jevity 1.5 Garth (JEVITY1.5RTH)  Total Volume for 24 Hours (mL): 720  Intermittent  Starting Tube Feed Rate {mL per Hour}: 10  Increase Tube Feed Rate by (mL): 10    Every 4 hours  Until Goal Tube Feed Rate (mL per Hour): 40  Tube Feeding Hours ON: 18  Tube Feeding OFF (Hours): 6  Tube Feed Start Time: 11:00  Free Water Flush  Bolus   Total Volume per Flush (mL): 250   Frequency: Every 6 Hours (02-23-25 @ 08:53) [Active]

## 2025-02-24 NOTE — DIETITIAN INITIAL EVALUATION ADULT - ENTERAL
With consideration for current propofol rate, recommend change in feeds to Vital AF/1.2 with goal rate of 60mL/hr x 18hr to provide 1080mL volume, 1296kcal (+515kcal from propofol = 1811kcal), 81g protein, 876mL free water. Meets ~20kcal/kg based on current weight 88.7kg and 1.3g/kg protein based on IBW 64.4kg. Additional free water flushes per team.

## 2025-02-24 NOTE — PROGRESS NOTE ADULT - PROBLEM SELECTOR PLAN 6
Unclear cause. Also with anemia. May be related to metastatic cancer that may have gone to bone marrow. Not present in the past vs. viral covid19  - Monitor for improvement daily  - Appreciate heme/onc  - brain lesion on prior MRI?  - heme/onc following.

## 2025-02-24 NOTE — DIETITIAN INITIAL EVALUATION ADULT - PERTINENT LABORATORY DATA
02-23    147[H]  |  114[H]  |  60[H]  ----------------------------<  170[H]  4.2   |  19[L]  |  3.65[H]    Ca    9.4      23 Feb 2025 22:48  Phos  4.7     02-23  Mg     2.1     02-23    TPro  5.7[L]  /  Alb  2.4[L]  /  TBili  0.4  /  DBili  x   /  AST  29  /  ALT  25  /  AlkPhos  116  02-23

## 2025-02-24 NOTE — DIETITIAN INITIAL EVALUATION ADULT - ORAL INTAKE PTA/DIET HISTORY
Unable to obtain subjective information from pt at this time; pt currently intubated/sedated. Per RD note from 1/21/25 pt with NKFA or intolerances, no micronutrient supplementation reported. Pt s/p SLP evaluation 2/21 recommended for pureed diet with moderately thickened liquids prior to intubation.

## 2025-02-24 NOTE — DIETITIAN INITIAL EVALUATION ADULT - PROBLEM SELECTOR PLAN 6
Continue with home atorvastatin 20 mg bedtime for HLD, vitamin D supplementation, pantoprazole 40 mg daily, senna 2 tab bedtime, and Miralax 17 gram daily.     General  - DVT prophylaxis: heparin 5000 units q8h   - Diet: regular   - Medication reconciliation: complete   - Code: Full   - Medications: Tylenol 650 mg q6h as needed for pain, Maalox 30 mL q4h as needed for acid reflux, melatonin 3 mg bedtime as needed for insomnia, Zofran 4 mg IV q8h as needed for nausea/vomiting     Disposition  - Needed before discharge: off BIPAP for 24 hours   - Anticipated discharge date: acute   - Discharge to: has apartment in [place] with aide but was recently at SNF for rehab   - Appointments after discharge: PCP    Plan discussed with patient's brother Fernando (over the phone, [phone]) in detail. He agrees with plan. All questions answered. He asked that I update Ester (sister, [phone]); she was called and updated as well, also agrees with the plan, also answered all questions.

## 2025-02-24 NOTE — PROGRESS NOTE ADULT - ASSESSMENT
69 year old male with a past medical history of hypertension, hyperlipemia VAZQUEZ (on CPAP at bedtime, NC 2 liters during the day), CKD stage 3, epilepsy, schizophrenia, developmental delay, metastatic testicular cancer (s/p orchiectomy, actively on chemotherapy, last dose of etoposide/cisplatin on 6/2024) presenting with worsening shortness of breath. Patient was recently hospitalized at [facility] from January 27th 2025 to February 6th 2025 for seizure and influenza virus infection, which required intubation. He was sent to rehab (originally from home) from hospital. He returns due to sudden onset shortness of breath and worsening oxygenation. Of note, he tested positive for COVID-19       1- SHAGUFTA on CKDIII  2- covid-19  3- anemia  4- hypotension   5- respiratory failure       SHAGUFTA suspected in setting of new infection. covid 19   cr is now worsening   bumex iv as needed to keep O> I   hypernatremia start free water   add free water 250 cc q6   decadron 6 mg iv daily   suspect superimposed pna tx with zosyn 3.375 g iv tid   levophed drip to maintain bp   anemia, trend hgb  d.w icu team when seen earlier

## 2025-02-24 NOTE — PROGRESS NOTE ADULT - ATTENDING COMMENTS
- Continue AEDs  - Sedation as needed goal RASS -1 to 0  - Wean from vent as tolerated  - Titrate pressors goal MAP >= 65  - Trend Cr, avoid nephrotoxins  - Diuresis goal net negative 1-2 L  - Complete course of abx  - Complete course of dexamethasone for COVID, completed remdesivir  - DVT prophylaxis 68 y/o M w/hx as above including metastatic testicular cancer, epilepsy on AEDs, chronic respiratory failure with hypoxia and hypercapnia on home O2, HFpEF admitted for acute on chronic respiratory failure with hypoxia and hypercapnia likely secondary to combination of COVID PNA, bacterial superinfection, and acute pulmonary edema due to acute on chronic HFpEF. Hypotension likely severe sepsis with septic shock. SHAGUFTA likely ATN +/- venous congestion.    # Acute on chronic respiratory failure with hypoxia and hypercapnia  # COVID PNA  # Bacterial PNA  # Acute pulmonary edema  # Acute on chronic HFpEF  # Hypotension  # Severe sepsis with septic shock  # SHAGUFTA  # Hyperntremia  # Epilepsy    - Continue AEDs  - Sedation as needed goal RASS -1 to 0  - Wean from vent as tolerated  - Titrate pressors goal MAP >= 65  - Trend Cr, avoid nephrotoxins  - Diuresis goal net negative 1-2 L  - Complete course of abx  - Complete course of dexamethasone for COVID, completed remdesivir  - D5 gtt for hypernatremia  - DVT prophylaxis

## 2025-02-24 NOTE — PROGRESS NOTE ADULT - ASSESSMENT
Patient is a 69 year old male with PMH of HTN, HLD, VAZQUEZ (on CPAP at bedtime, NC 2 liters during the day), CKD3, epilepsy, schizophrenia, developmental delay, metastatic testicular cancer (s/p orchiectomy, actively on chemotherapy, last dose of etoposide/cisplatin on 6/2024) presenting with worsening shortness of breath. Patient was recently hospitalized at [facility] from January 27th 2025 to February 6th 2025 for seizure and influenza virus infection, which required intubation. He was sent to rehab (originally from home) from hospital and now returns due to sudden onset shortness of breath and worsening oxygenation. Of note, he tested positive for COVID-19 at facility on 2/13/25 and was not put on any COVID-19 treatment at the time, only guaifenesin and scopolamine patch. Patient was placed on non-rebreather and sent to the hospital on 2/16/25.     Acute on chronic hypoxic respiratory failure  COVID-19 pneumonia, superimposed bacterial pneumonia  Now with severe sepsis, hypotension, SHAGUFTA, likely due to pneumonia   Acute on chronic HFpEF  SHAGUFTA on CKD  - noted initial discussion with patient/family and decided to hold off on remdesivir given LFTs and SHAGUFTA  - CXR with likely congestive changes, possible superimposed focal infiltrate in RUL, no effusion  - CT chest with extensive b/l ggo majority new since 1/30/25-c/w COVID pneumonia; tracheomalacia   - PCT likely not helpful given patient with renal dysfunction   - Legionella and Strep pneumoniae urine ags negative   - MRSA PCR screen negative, s/p vancomycin   - Ucx noted with Klebsiella, pt with no gu symptoms but covered by zosyn   - 2/16 Bcx negative   - 2/17 Scx with normal resp lucia   - 2/18 had worsening resp status on NC requiring AVAPS, started on remdesivir   - 2/19 overnight febrile to 100.8F, currently on HFNC, comfortable, WBC remains wnl, UA negative   - exam with no new focal findings, suspect fevers d/t COVID pneumonia   - 2/21 afebrile >24h, remains on HFNC/AVAPS, WBC wnl  - 2/22 completed remdesivir x5d course   - 2/23 s/p RRT for inc WOB, intubated and transferred to MICU  - 2/24 remains intubated, on sedation, pressor support, SHAGUFTA, WBC wnl, remains afebrile     Recommendations:  Continue zosyn 3.375g IV Q8h (renally dosed) for now   Pulmonary following, on dexamethasone   Nephrology following, monitor Cr   Monitor temps/WBC - if any fevers or worsening, send Bcx, Scx, obtain CXR and broaden to meropenem  Supportive care  Aspiration precautions  Continue rest of care per primary team       Greyson Mart M.D.  [facility]  Available on [employer] TEAMS - *PREFERRED*  [phone]  After 5pm on weekdays and all day on weekends - please call [phone]     Thank you for consulting us and involving us in the management of this patients case. In addition to reviewing history, imaging, documents, labs, microbiology, took into account antibiotic stewardship, local antibiogram and infection control strategies and potential transmission issues.

## 2025-02-24 NOTE — PROGRESS NOTE ADULT - SUBJECTIVE AND OBJECTIVE BOX
[facility]  JACINTO Horton Y. Patel, S. Shah, G. Casimir  [phone]  ([phone] - weekdays after 5pm and weekends)    Name: OSCAR MILAN  Age/Gender: 69y Male  MRN: [id number]    Interval History:  Patient seen and examined this morning.   Intubated, on pressor, sedated.  Unable to obtain ROS.   Notes reviewed  Allergies: seasonal allergies (Unknown)  penicillin (Hives)      Objective:  Vitals:   T(F): 99.5 (25 @ 08:00), Max: 99.9 (25 @ 00:00)  HR: 101 (25 @ 10:30) (73 - 101)  BP: 134/66 (25 @ 10:30) (83/49 - 134/66)  RR: 55 (25 @ 10:30) (16 - 55)  SpO2: 83% (25 @ 10:30) (83% - 100%)  Physical Examination:  General: no acute distress  HEENT: normocephalic, atraumatic, ETT  Respiratory: clear to auscultation anteriorly   Cardiovascular: S1 and S2 present, normal rate   Gastrointestinal: obese, soft, nondistended  Extremities: no LE edema, no cyanosis  Skin: no visible rash    Laboratory Studies:  CBC:                       7.9    8.76  )-----------( 102      ( 2025 22:48 )             25.9     WBC Trend:  8.76 25 @ 22:48  10.78 25 @ 16:19  9.74 25 @ 06:14  8.59 25 @ 06:08  7.85 25 @ 19:44  8.68 25 @ 06:30  5.88 25 @ 06:17  5.83 25 @ 17:51  6.09 25 @ 08:44  5.67 25 @ 06:28  6.65 25 @ 05:02    CMP:     147[H]  |  114[H]  |  60[H]  ----------------------------<  170[H]  4.2   |  19[L]  |  3.65[H]    Ca    9.4      2025 22:48  Phos  4.7       Mg     2.1         TPro  5.7[L]  /  Alb  2.4[L]  /  TBili  0.4  /  DBili  x   /  AST  29  /  ALT  25  /  AlkPhos  116      Creatinine: 3.65 mg/dL (25 @ 22:48)  Creatinine: 2.64 mg/dL (25 @ 06:11)  Creatinine: 2.47 mg/dL (25 @ 06:08)  Creatinine: 2.54 mg/dL (25 @ 19:36)  Creatinine: 2.44 mg/dL (25 @ 14:13)  Creatinine: 0.68 mg/dL (25 @ 01:02)  Creatinine: 2.31 mg/dL (25 @ 06:17)  Creatinine: 2.64 mg/dL (25 @ 08:57)  Creatinine: 2.09 mg/dL (25 @ 06:28)  Creatinine: 1.95 mg/dL (25 @ 06:27)  Creatinine: 3.11 mg/dL (25 @ 05:02)      LIVER FUNCTIONS - ( 2025 22:48 )  Alb: 2.4 g/dL / Pro: 5.7 g/dL / ALK PHOS: 116 U/L / ALT: 25 U/L / AST: 29 U/L / GGT: x           Urinalysis Basic - ( 2025 04:52 )  Color: Yellow / Appearance: Turbid / S.024 / pH: x  Gluc: x / Ketone: Negative mg/dL  / Bili: Negative / Urobili: 0.2 mg/dL   Blood: x / Protein: 100 mg/dL / Nitrite: Negative   Leuk Esterase: Negative / RBC: 2 /HPF / WBC 75 /HPF   Sq Epi: x / Non Sq Epi: >36 /HPF / Bacteria: Negative /HPF    Microbiology: reviewed   Culture - Blood (collected 25 @ 19:36)  Source: .Blood Blood  Preliminary Report (25 @ 02:02):    No growth at 48 Hours    Culture - Blood (collected 25 @ 19:35)  Source: .Blood Blood  Preliminary Report (25 @ 02:02):    No growth at 48 Hours    Culture - Blood (collected 25 @ 00:18)  Source: .Blood Blood-Peripheral  Final Report (25 @ 03:01):    No growth at 5 days    Culture - Blood (collected 25 @ 00:18)  Source: .Blood Blood-Peripheral  Final Report (25 @ 03:01):    No growth at 5 days    Culture - Sputum (collected 25 @ 22:23)  Source: .Sputum Sputum  Gram Stain (25 @ 06:21):    Few polymorphonuclear leukocytes per low power field    No Squamous epithelial cells per low power field    Few Gram Variable Rods seen per oil power field    Rare Gram positive cocci in pairs seen per oil power field  Final Report (25 @ 16:20):    Commensal lucia consistent with body site    Culture - Urine (collected 25 @ 11:47)  Source: Clean Catch Clean Catch (Midstream)  Final Report (25 @ 11:44):    10,000 - 49,000 CFU/mL Klebsiella pneumoniae  Organism: Klebsiella pneumoniae (25 @ 11:44)  Organism: Klebsiella pneumoniae (25 @ 11:44)      Method Type: MARIELENA      -  Amoxicillin/Clavulanic Acid: S <=8/4      -  Ampicillin: R >16 These ampicillin results predict results for amoxicillin      -  Ampicillin/Sulbactam: S <=4/2      -  Aztreonam: S <=4      -  Cefazolin: S <=2 For uncomplicated UTI with K. pneumoniae, E. coli, or P. mirablis: MARIELENA <=16 is sensitive and MARIELENA >=32 is resistant. This also predicts results for oral agents cefaclor, cefdinir, cefpodoxime, cefprozil, cefuroxime axetil, cephalexin and locarbef for uncomplicated UTI. Note that some isolates may be susceptible to these agents while testing resistant to cefazolin.      -  Cefepime: S <=2      -  Cefoxitin: S <=8      -  Ceftriaxone: S <=1      -  Cefuroxime: S <=4      -  Ciprofloxacin: S <=0.25      -  Ertapenem: S <=0.5      -  Gentamicin: S <=2      -  Imipenem: S <=1      -  Levofloxacin: S <=0.5      -  Meropenem: S <=1      -  Nitrofurantoin: I 64 Should not be used to treat pyelonephritis      -  Piperacillin/Tazobactam: S <=8      -  Tobramycin: S <=2      -  Trimethoprim/Sulfamethoxazole: S <=0.5/9.5    Culture - Blood (collected 25 @ 09:35)  Source: .Blood BLOOD  Final Report (25 @ 15:01):    No growth at 5 days    Culture - Blood (collected 25 @ 09:25)  Source: .Blood BLOOD  Final Report (25 @ 15:01):    No growth at 5 days    Radiology: reviewed   < from: Xray Chest 1 View- PORTABLE-Urgent (Xray Chest 1 View- PORTABLE-Urgent .) (25 @ 09:25) >    FINDINGS:  Endotracheal tube is within the mid trachea.  Enteric tube with tip below the diaphragm out of the field of view.  Right-sided port with its tip in the right atrium.  The heart is not accurately assessed in this AP projection.  Low lung volumes. Unchanged multifocal airspace opacities.  There is no pneumothorax or large pleural effusion.  No acute bony abnormality. Spinal fixation hardware in place.    IMPRESSION:  Tubes as above.  Bilateral airspace opacities unchanged    --- End of Report ---    < end of copied text >      Medications:  acetaminophen     Tablet .. 650 milliGRAM(s) Oral every 6 hours PRN  albuterol/ipratropium for Nebulization 3 milliLiter(s) Nebulizer every 6 hours  aluminum hydroxide/magnesium hydroxide/simethicone Suspension 30 milliLiter(s) Oral every 4 hours PRN  atorvastatin 20 milliGRAM(s) Oral at bedtime  chlorhexidine 0.12% Liquid 15 milliLiter(s) Oral Mucosa every 12 hours  cholecalciferol 1000 Unit(s) Oral daily  dexAMETHasone  Injectable 6 milliGRAM(s) IV Push daily  dextrose 5%. 1000 milliLiter(s) IV Continuous <Continuous>  escitalopram 15 milliGRAM(s) Oral with breakfast  fentaNYL   Infusion.. 2 MICROgram(s)/kG/Hr IV Continuous <Continuous>  gabapentin Solution 150 milliGRAM(s) Oral every 12 hours  heparin   Injectable 7500 Unit(s) IV Push every 6 hours PRN  heparin   Injectable 3500 Unit(s) IV Push every 6 hours PRN  heparin  Infusion.  Unit(s)/Hr IV Continuous <Continuous>  influenza  Vaccine (HIGH DOSE) 0.5 milliLiter(s) IntraMuscular once  lacosamide IVPB 200 milliGRAM(s) IV Intermittent every 12 hours  levETIRAcetam  IVPB 1000 milliGRAM(s) IV Intermittent <User Schedule>  lurasidone 40 milliGRAM(s) Oral at bedtime  melatonin 3 milliGRAM(s) Oral at bedtime PRN  metoprolol tartrate Injectable 5 milliGRAM(s) IV Push every 6 hours  naloxegol 12.5 milliGRAM(s) Oral daily  norepinephrine Infusion 0.05 MICROgram(s)/kG/Min IV Continuous <Continuous>  ondansetron Injectable 4 milliGRAM(s) IV Push every 8 hours PRN  pantoprazole    Tablet 40 milliGRAM(s) Oral before breakfast  piperacillin/tazobactam IVPB.. 3.375 Gram(s) IV Intermittent every 8 hours  polyethylene glycol 3350 17 Gram(s) Oral daily  propofol Infusion 10 MICROgram(s)/kG/Min IV Continuous <Continuous>  senna 2 Tablet(s) Oral at bedtime    Current Antimicrobials:  piperacillin/tazobactam IVPB.. 3.375 Gram(s) IV Intermittent every 8 hours    Prior/Completed Antimicrobials:  piperacillin/tazobactam IVPB...  remdesivir  IVPB  remdesivir  IVPB  remdesivir  IVPB  vancomycin  IVPB.

## 2025-02-24 NOTE — PROGRESS NOTE ADULT - PROBLEM SELECTOR PLAN 3
Has a history of CKD stage 3, Cr 2.38 at baseline. Presents with Cr 3.27. Likely pre-renal.   - Renal US no hydronephrosis   - Fe Urea = 29%  - Monitor Cr daily   - Avoid nephrotoxins, dose medication to GFR  - Appreciate nephrology  - cr rising, on gentle IVF for hypernatremia, renal follow up. Yes

## 2025-02-24 NOTE — DIETITIAN INITIAL EVALUATION ADULT - REASON INDICATOR FOR ASSESSMENT
Assessment warranted for length of stay.  Information obtained from interdisciplinary team/rounds, RN, medical record.

## 2025-02-24 NOTE — PROGRESS NOTE ADULT - NSPROGADDITIONALINFOA_GEN_ALL_CORE
acute respiratory failure with hypoxia on hi-flow for meal breaks <--> bipap  speech & swallow eval appreciated, s/p FEES: recommends thicken pureed diet.  events reviewed, RRT for hypoxia, intubated/sedated for increased work of breathing.   transferred to ICU. ICU eval appreciated  GOC discussed, full code per the sister.   critically ill.    d/w pt's sister Ester RASHID (retired RN), clinical status updated in details. All questions answered.    - Dr. SHIRA Leroy ([facility])  - [phone]

## 2025-02-24 NOTE — PROGRESS NOTE ADULT - PROBLEM SELECTOR PLAN 3
-CT chest with b/l GGO, new since 1/30/25. Likely COVID PNA +/- superimposed bacterial PNA  -Continue ABX.  2/24: cont antibiotics;

## 2025-02-24 NOTE — PROGRESS NOTE ADULT - ASSESSMENT
69 year old male with a past medical history of hypertension, hyperlipemia VAZQUEZ (on CPAP at bedtime, NC 2 liters during the day), CKD stage 3, epilepsy, schizophrenia, developmental delay, metastatic testicular cancer (s/p orchiectomy, actively on chemotherapy, last dose of etoposide/cisplatin on 6/2024), presenting with acute on chronic hypoxemic respiratory failure, secondary to (a) COVID-19 infection, (b) superimposed pneumonia after recent influenza infection, and/or (c) fluid overload.     MICU consulted and accepted after RRT called earlier this AM due to increased work of breathing    NEURO:  #Mental status:  - Sedated, however, was AAOx2 prior to increased work of breathing that required intubation  - continue with fentanyl gtt, propofol    #Epilepsy:  - Continue with home meds which include Lacosamide, Lurasidone, Lamotrigine    #Brain Mets:  - MRI brain now with 5.4 mm enhancing lesion in the LEFT middle cerebellar peduncle with associated edema suspicious for metastases      CV:  #Hemodynamically stable  - Patients BP holding currently, however, is tachycardic and tachypneic   - Tachycardia most likely 2/2 inc work of breathing and underlying infection and Afib    #Echo:  Recent echo last admission showing Left ventricular cavity is normal in size. Left ventricular systolic function is normal with an ejection fraction of 62 %. There are no regional wall motion abnormalities seen. Normal right ventricular cavity size and normal right ventricular systolic function.     - Repeat TTE pending     #Afib w/RVR  - New onset Afib RVR (on tele, confirmed with EKG 2/19)   - Plan: Started metoprolol 5mg IVP q6 hours & Hep gtt  - If rate remains uncontrolled, can start Cardizem drip at 5mg/h  - c/w Heparin drip    #HTN:  - On Hydralazine 25mg TID, Amlodipine 5mg    PULM:  #Vent   - Due to increased work of breathing, now intubated    #AHRF  - Patient admitted to the hospital for AHRF, found to be COVID (+)  - Likely 2/2 PNA, does not appear to CHF decompensation  - Diuretics currently being held  - Continue with airway clearance regimen which includes duoneb,     RENAL:  #SHAGUFTA:   - Hx of CKD stage 3, Cr 2.38 at baseline, presented with Cr 3.27, although now near baseline  - In setting of COVID (+)  - Pre-renal, FeUrea 29%  - Monitor Cr  - Avoid nephrotoxic meds  - Wakefield in place      #Electrolyte abnormalities  Hypernatremia  - Continue to monitor  - If uptrending can consider D5W  - Free water deficit 1.4  - F/u urine studies  - Nephro recs appreciated    GI:  No acute issues     #Diet: Tube feeds via OG tube    #Bowel regimen: Movantik     #Bowel Regimen  - On Senna and Miralax    ENDO:  No acute issues  Thydoid nodule noted in prior notes    HEMATOLOGIC:  #Thrombocytopenic  - Unclear origin  - Concern for possible mets to bone marrow? vs due to infection  - Continue to monitor  - Transfuse to maintain Plt>10K unless actively bleeding then maintain >50K  - Heme-Onc recs appreciated    #DVT prophylaxis   - SCD    ID:  #COVID-19  - Concern for possible superimposed PNA   - Continue Zosyn   - S/p Remdesivir  - Blood cx from 2 days ago negative    SKIN:  #Lines:  2x PIV    Ethics:  - Full Code  - Contact: Sister Ester [phone] (unable to be reached at time of intubation) 69 year old male with a past medical history of hypertension, hyperlipemia VAZQUEZ (on CPAP at bedtime, NC 2 liters during the day), CKD stage 3, epilepsy, schizophrenia, developmental delay, metastatic testicular cancer (s/p orchiectomy, actively on chemotherapy, last dose of etoposide/cisplatin on 6/2024), presenting with acute on chronic hypoxemic respiratory failure, secondary to (a) COVID-19 infection, (b) superimposed pneumonia after recent influenza infection, and/or (c) fluid overload.     MICU consulted and accepted after RRT due to increased work of breathing    NEURO:  #Mental status:  - Sedated, however, was AAOx2 prior to increased work of breathing that required intubation  - continue with fentanyl gtt, propofol -> will wean fentanyl    #Epilepsy:  - Continue with home meds which include Lacosamide, Lurasidone, Lamotrigine - lamotrigine restarted 2/24 after being held on 2/18, per pharmacy restarted at 25 BID, uptitrate over weeks    #Brain Mets:  - MRI brain now with 5.4 mm enhancing lesion in the LEFT middle cerebellar peduncle with associated edema suspicious for metastases    CV:  #Echo:  Recent echo last admission showing Left ventricular cavity is normal in size. Left ventricular systolic function is normal with an ejection fraction of 62 %. There are no regional wall motion abnormalities seen. Normal right ventricular cavity size and normal right ventricular systolic function.   - Repeat echo continuing to show EF 70%    #Afib w/RVR  - New onset Afib RVR (on tele, confirmed with EKG 2/19)   - Plan: Started metoprolol 5mg IVP q6 hours & Hep gtt  - If rate remains uncontrolled, can start Cardizem drip at 5mg/h  - c/w Heparin drip    #HTN:  - On Hydralazine 25mg TID, Amlodipine 5mg, held iso current hypotension, on decreasing levo requirements    PULM:  #Vent   - Due to increased work of breathing, now intubated    #AHRF  - Patient admitted to the hospital for AHRF, found to be COVID (+)  - Likely 2/2 PNA, does not appear to CHF decompensation  - Diuretics currently being held  - Continue with airway clearance regimen which includes duoneb,     RENAL:  #SHAGUFTA:   - Hx of CKD stage 3, Cr 2.38 at baseline, worsening and decreasing urine output  - In setting of COVID (+)  - Pre-renal, FeUrea 29%  - Monitor Cr  - Avoid nephrotoxic meds  - Wakefield in place  - Bumex 2 IVP challenge      #Electrolyte abnormalities  Hypernatremia  - Continue to monitor  - If uptrending can consider D5W  - Free water deficit 1.4  - F/u urine studies  - Nephro recs appreciated    GI:  No acute issues     #Diet: Tube feeds via OG tube    #Bowel regimen: Movantik     #Bowel Regimen  - On Senna and Miralax    ENDO:  No acute issues  Thyroid nodule noted in prior notes    HEMATOLOGIC:  #Thrombocytopenic  - Unclear origin  - Concern for possible mets to bone marrow? vs due to infection  - Continue to monitor  - Transfuse to maintain Plt>10K unless actively bleeding then maintain >50K  - Heme-Onc recs appreciated    #DVT prophylaxis   - SCD    ID:  #COVID-19  - Concern for possible superimposed PNA   - Continue Zosyn   - S/p Remdesivir  - Blood cx from 2 days ago negative    SKIN:  #Lines:  2x PIV    Ethics:  - Full Code  - Contact: Sister Ester [phone] (unable to be reached at time of intubation)

## 2025-02-24 NOTE — PROGRESS NOTE ADULT - SUBJECTIVE AND OBJECTIVE BOX
*******************************  Navya Michelle PGY-2  *******************************    INTERVAL HPI/OVERNIGHT EVENTS:    SUBJECTIVE: Patient seen and examined at bedside.     OBJECTIVE:    VITAL SIGNS:  ICU Vital Signs Last 24 Hrs  T(C): 37.5 (2025 04:00), Max: 37.7 (2025 00:00)  T(F): 99.5 (2025 04:00), Max: 99.9 (2025 00:00)  HR: 94 (2025 06:45) (73 - 120)  BP: 107/52 (2025 06:45) (83/49 - 156/70)  BP(mean): 75 (2025 06:45) (58 - 100)  ABP: --  ABP(mean): --  RR: 17 (2025 06:45) (16 - 32)  SpO2: 100% (2025 06:45) (91% - 100%)    O2 Parameters below as of 2025 05:22  Patient On (Oxygen Delivery Method): ventilator          Mode: AC/ CMV (Assist Control/ Continuous Mandatory Ventilation), RR (machine): 16, TV (machine): 500, FiO2: 50, PEEP: 8, ITime: 0.76, MAP: 13, PIP: 35     @ 07: @ 07:00  --------------------------------------------------------  IN: 121 mL / OUT: 1650 mL / NET: -1529 mL     @ 07: @ 06:55  --------------------------------------------------------  IN: 3537.3 mL / OUT: 295 mL / NET: 3242.3 mL      CAPILLARY BLOOD GLUCOSE      POCT Blood Glucose.: 148 mg/dL (2025 05:41)        PHYSICAL EXAM:  GENERAL: NAD, well-developed  HEAD:  Atraumatic, Normocephalic  EYES: EOMI, PERRLA, conjunctiva and sclera clear  NECK: Supple, No JVD  CHEST/LUNG: Clear to auscultation bilaterally; No wheeze  HEART: Regular rate and rhythm; No murmurs, rubs, or gallops  ABDOMEN: Soft, Nontender, Nondistended; Bowel sounds present  EXTREMITIES:  2+ Peripheral Pulses, No clubbing, cyanosis, or edema  PSYCH: AAOx3  NEUROLOGY: non-focal  SKIN: No rashes or lesions  Lines:      MEDICATIONS:  MEDICATIONS  (STANDING):  albuterol/ipratropium for Nebulization 3 milliLiter(s) Nebulizer every 6 hours  atorvastatin 20 milliGRAM(s) Oral at bedtime  chlorhexidine 0.12% Liquid 15 milliLiter(s) Oral Mucosa every 12 hours  cholecalciferol 1000 Unit(s) Oral daily  dexAMETHasone  Injectable 6 milliGRAM(s) IV Push daily  escitalopram 15 milliGRAM(s) Oral with breakfast  fentaNYL   Infusion.. 2 MICROgram(s)/kG/Hr (9.36 mL/Hr) IV Continuous <Continuous>  gabapentin Solution 150 milliGRAM(s) Oral every 12 hours  heparin  Infusion.  Unit(s)/Hr (17 mL/Hr) IV Continuous <Continuous>  influenza  Vaccine (HIGH DOSE) 0.5 milliLiter(s) IntraMuscular once  lacosamide IVPB 200 milliGRAM(s) IV Intermittent every 12 hours  levETIRAcetam  IVPB 1000 milliGRAM(s) IV Intermittent <User Schedule>  lurasidone 40 milliGRAM(s) Oral at bedtime  metoprolol tartrate Injectable 5 milliGRAM(s) IV Push every 6 hours  naloxegol 12.5 milliGRAM(s) Oral daily  norepinephrine Infusion 0.05 MICROgram(s)/kG/Min (8.78 mL/Hr) IV Continuous <Continuous>  pantoprazole    Tablet 40 milliGRAM(s) Oral before breakfast  piperacillin/tazobactam IVPB.. 3.375 Gram(s) IV Intermittent every 8 hours  polyethylene glycol 3350 17 Gram(s) Oral daily  propofol Infusion 10 MICROgram(s)/kG/Min (5.62 mL/Hr) IV Continuous <Continuous>  senna 2 Tablet(s) Oral at bedtime    MEDICATIONS  (PRN):  acetaminophen     Tablet .. 650 milliGRAM(s) Oral every 6 hours PRN Temp greater or equal to 38C (100.4F), Mild Pain (1 - 3)  aluminum hydroxide/magnesium hydroxide/simethicone Suspension 30 milliLiter(s) Oral every 4 hours PRN Dyspepsia  heparin   Injectable 7500 Unit(s) IV Push every 6 hours PRN For aPTT less than 40  heparin   Injectable 3500 Unit(s) IV Push every 6 hours PRN For aPTT between 40 - 57  melatonin 3 milliGRAM(s) Oral at bedtime PRN Insomnia  ondansetron Injectable 4 milliGRAM(s) IV Push every 8 hours PRN Nausea and/or Vomiting      ALLERGIES:  Allergies    seasonal allergies (Unknown)  penicillin (Hives)    Intolerances    latex (Rash)      LABS:                        7.9    8.76  )-----------( 102      ( 2025 22:48 )             25.9         147[H]  |  114[H]  |  60[H]  ----------------------------<  170[H]  4.2   |  19[L]  |  3.65[H]    Ca    9.4      2025 22:48  Phos  4.7       Mg     2.1         TPro  5.7[L]  /  Alb  2.4[L]  /  TBili  0.4  /  DBili  x   /  AST  29  /  ALT  25  /  AlkPhos  116  02-    PT/INR - ( 2025 22:48 )   PT: 13.8 sec;   INR: 1.20 ratio         PTT - ( 2025 04:51 )  PTT:93.4 sec  Urinalysis Basic - ( 2025 04:52 )    Color: Yellow / Appearance: Turbid / S.024 / pH: x  Gluc: x / Ketone: Negative mg/dL  / Bili: Negative / Urobili: 0.2 mg/dL   Blood: x / Protein: 100 mg/dL / Nitrite: Negative   Leuk Esterase: Negative / RBC: 2 /HPF / WBC 75 /HPF   Sq Epi: x / Non Sq Epi: >36 /HPF / Bacteria: Negative /HPF        RADIOLOGY & ADDITIONAL TESTS: Reviewed.     *******************************  Navya Michelle PGY-2  *******************************    INTERVAL HPI/OVERNIGHT EVENTS: Decreasing urine output    SUBJECTIVE: Patient seen and examined at bedside, does not respond to noxious stimuli    OBJECTIVE:    VITAL SIGNS:  ICU Vital Signs Last 24 Hrs  T(C): 37.5 (2025 04:00), Max: 37.7 (2025 00:00)  T(F): 99.5 (2025 04:00), Max: 99.9 (2025 00:00)  HR: 94 (2025 06:45) (73 - 120)  BP: 107/52 (2025 06:45) (83/49 - 156/70)  BP(mean): 75 (2025 06:45) (58 - 100)  ABP: --  ABP(mean): --  RR: 17 (2025 06:45) (16 - 32)  SpO2: 100% (2025 06:45) (91% - 100%)    O2 Parameters below as of 2025 05:22  Patient On (Oxygen Delivery Method): ventilator          Mode: AC/ CMV (Assist Control/ Continuous Mandatory Ventilation), RR (machine): 16, TV (machine): 500, FiO2: 50, PEEP: 8, ITime: 0.76, MAP: 13, PIP: 35     @ 07: @ 07:00  --------------------------------------------------------  IN: 121 mL / OUT: 1650 mL / NET: -1529 mL     @ 07: @ 06:55  --------------------------------------------------------  IN: 3537.3 mL / OUT: 295 mL / NET: 3242.3 mL      CAPILLARY BLOOD GLUCOSE      POCT Blood Glucose.: 148 mg/dL (2025 05:41)        PHYSICAL EXAM:  GENERAL: Intubated sedated, increased WOB  HEENT: NCAT  NECK: supple without JVD  CHEST/LUNG: breath sounds bilaterally  CARDIOVASCULAR: regular rate and rhythm, normal S1 and S2, no murmur/rub/gallop  ABDOMEN: positive bowel sounds, non-tender, non-distended, no organomegaly   MUSCULOSKELETAL:  moving all four extremities   EXTREMITIES:  no lower extremity edema  NEUROLOGY: sedated    MEDICATIONS:  MEDICATIONS  (STANDING):  albuterol/ipratropium for Nebulization 3 milliLiter(s) Nebulizer every 6 hours  atorvastatin 20 milliGRAM(s) Oral at bedtime  chlorhexidine 0.12% Liquid 15 milliLiter(s) Oral Mucosa every 12 hours  cholecalciferol 1000 Unit(s) Oral daily  dexAMETHasone  Injectable 6 milliGRAM(s) IV Push daily  escitalopram 15 milliGRAM(s) Oral with breakfast  fentaNYL   Infusion.. 2 MICROgram(s)/kG/Hr (9.36 mL/Hr) IV Continuous <Continuous>  gabapentin Solution 150 milliGRAM(s) Oral every 12 hours  heparin  Infusion.  Unit(s)/Hr (17 mL/Hr) IV Continuous <Continuous>  influenza  Vaccine (HIGH DOSE) 0.5 milliLiter(s) IntraMuscular once  lacosamide IVPB 200 milliGRAM(s) IV Intermittent every 12 hours  levETIRAcetam  IVPB 1000 milliGRAM(s) IV Intermittent <User Schedule>  lurasidone 40 milliGRAM(s) Oral at bedtime  metoprolol tartrate Injectable 5 milliGRAM(s) IV Push every 6 hours  naloxegol 12.5 milliGRAM(s) Oral daily  norepinephrine Infusion 0.05 MICROgram(s)/kG/Min (8.78 mL/Hr) IV Continuous <Continuous>  pantoprazole    Tablet 40 milliGRAM(s) Oral before breakfast  piperacillin/tazobactam IVPB.. 3.375 Gram(s) IV Intermittent every 8 hours  polyethylene glycol 3350 17 Gram(s) Oral daily  propofol Infusion 10 MICROgram(s)/kG/Min (5.62 mL/Hr) IV Continuous <Continuous>  senna 2 Tablet(s) Oral at bedtime    MEDICATIONS  (PRN):  acetaminophen     Tablet .. 650 milliGRAM(s) Oral every 6 hours PRN Temp greater or equal to 38C (100.4F), Mild Pain (1 - 3)  aluminum hydroxide/magnesium hydroxide/simethicone Suspension 30 milliLiter(s) Oral every 4 hours PRN Dyspepsia  heparin   Injectable 7500 Unit(s) IV Push every 6 hours PRN For aPTT less than 40  heparin   Injectable 3500 Unit(s) IV Push every 6 hours PRN For aPTT between 40 - 57  melatonin 3 milliGRAM(s) Oral at bedtime PRN Insomnia  ondansetron Injectable 4 milliGRAM(s) IV Push every 8 hours PRN Nausea and/or Vomiting      ALLERGIES:  Allergies    seasonal allergies (Unknown)  penicillin (Hives)    Intolerances    latex (Rash)      LABS:                        7.9    8.76  )-----------( 102      ( 2025 22:48 )             25.9     02-    147[H]  |  114[H]  |  60[H]  ----------------------------<  170[H]  4.2   |  19[L]  |  3.65[H]    Ca    9.4      2025 22:48  Phos  4.7       Mg     2.1         TPro  5.7[L]  /  Alb  2.4[L]  /  TBili  0.4  /  DBili  x   /  AST  29  /  ALT  25  /  AlkPhos  116  -    PT/INR - ( 2025 22:48 )   PT: 13.8 sec;   INR: 1.20 ratio         PTT - ( 2025 04:51 )  PTT:93.4 sec  Urinalysis Basic - ( 2025 04:52 )    Color: Yellow / Appearance: Turbid / S.024 / pH: x  Gluc: x / Ketone: Negative mg/dL  / Bili: Negative / Urobili: 0.2 mg/dL   Blood: x / Protein: 100 mg/dL / Nitrite: Negative   Leuk Esterase: Negative / RBC: 2 /HPF / WBC 75 /HPF   Sq Epi: x / Non Sq Epi: >36 /HPF / Bacteria: Negative /HPF        RADIOLOGY & ADDITIONAL TESTS: Reviewed.

## 2025-02-24 NOTE — PROGRESS NOTE ADULT - NUTRITIONAL ASSESSMENT
This patient has been assessed with a concern for Malnutrition and has been determined to have a diagnosis/diagnoses of Severe protein-calorie malnutrition.    This patient is being managed with:   Diet NPO with Tube Feed-  Tube Feeding Modality: Orogastric  Vital AF (VITALAFRTH)  Total Volume for 24 Hours (mL): 1080  Intermittent  Starting Tube Feed Rate {mL per Hour}: 10  Increase Tube Feed Rate by (mL): 10    Every 4 hours  Until Goal Tube Feed Rate (mL per Hour): 60  Tube Feeding Hours ON: 18  Tube Feeding OFF (Hours): 6  Tube Feed Start Time: 11:00  Free Water Flush  Bolus   Total Volume per Flush (mL): 350   Frequency: Every 6 Hours  Entered: Feb 24 2025  2:54PM

## 2025-02-24 NOTE — DIETITIAN INITIAL EVALUATION ADULT - NS FNS DIET ORDER
Diet, NPO with Tube Feed:   Tube Feeding Modality: Orogastric  Jevity 1.5 Garth (JEVITY1.5RTH)  Total Volume for 24 Hours (mL): 720  Intermittent  Starting Tube Feed Rate {mL per Hour}: 10  Increase Tube Feed Rate by (mL): 10    Every 4 hours  Until Goal Tube Feed Rate (mL per Hour): 40  Tube Feeding Hours ON: 18  Tube Feeding OFF (Hours): 6  Tube Feed Start Time: 11:00  Free Water Flush  Bolus   Total Volume per Flush (mL): 250   Frequency: Every 6 Hours (02-23-25 @ 08:53)

## 2025-02-24 NOTE — PROGRESS NOTE ADULT - PROBLEM SELECTOR PLAN 1
-Recent admission for acute respiratory failure in the setting of grand mal seizures, influenza, PNA. S/p intubation in MICU, was extubated to AVAPS  -Now COVID +  -CXR with low lung volumes, mild congestion. Possible underlying infiltrate  -CT chest with b/l GGO, new since 1/30/25. Likely COVID PNA +/- superimposed bacterial PNA.   -Continue bronchodilators/mucolytics   -Continue ABX  -Continue steroids  -Increased WOB requiring AVAPS  ePAP 5 RR 14 FIo2 50% Max Pressure 35 Min Pressure 15 qHS and PRN daytime for increased WOB.   -Started on HFNC 2/18  -S/p RRT 2/20 for hypoxia - HFNC reportedly had been dislodged - sats improved with AVAPS.   -S/p RRT 2/21 for tachypnea. Pt remains tachypneic at this time, ABG acceptable - continue AVAPS for now. If becomes less tachypneic, can trial back on HFNC. Wean o2 as tolerated, keep sats >90%.  2/24: new events noted:  now intubated and sedated: on vasopressors and antibiotics:

## 2025-02-24 NOTE — DIETITIAN INITIAL EVALUATION ADULT - NSFNSGIIOFT_GEN_A_CORE
Last  2/21 x 1.     02-23-25 @ 07:01  -  02-24-25 @ 07:00  --------------------------------------------------------  OUT:  Total OUT: 0 mL    Total NET: 500 mL

## 2025-02-24 NOTE — DIETITIAN INITIAL EVALUATION ADULT - PERTINENT MEDS FT
MEDICATIONS  (STANDING):  albuterol/ipratropium for Nebulization 3 milliLiter(s) Nebulizer every 6 hours  atorvastatin 20 milliGRAM(s) Oral at bedtime  chlorhexidine 0.12% Liquid 15 milliLiter(s) Oral Mucosa every 12 hours  cholecalciferol 1000 Unit(s) Oral daily  dexAMETHasone  Injectable 6 milliGRAM(s) IV Push daily  escitalopram 15 milliGRAM(s) Oral with breakfast  fentaNYL   Infusion.. 2 MICROgram(s)/kG/Hr (9.36 mL/Hr) IV Continuous <Continuous>  gabapentin Solution 150 milliGRAM(s) Oral every 12 hours  heparin  Infusion.  Unit(s)/Hr (17 mL/Hr) IV Continuous <Continuous>  influenza  Vaccine (HIGH DOSE) 0.5 milliLiter(s) IntraMuscular once  lacosamide IVPB 200 milliGRAM(s) IV Intermittent every 12 hours  levETIRAcetam  IVPB 1000 milliGRAM(s) IV Intermittent <User Schedule>  lurasidone 40 milliGRAM(s) Oral at bedtime  metoprolol tartrate Injectable 5 milliGRAM(s) IV Push every 6 hours  naloxegol 12.5 milliGRAM(s) Oral daily  norepinephrine Infusion 0.05 MICROgram(s)/kG/Min (8.78 mL/Hr) IV Continuous <Continuous>  pantoprazole    Tablet 40 milliGRAM(s) Oral before breakfast  piperacillin/tazobactam IVPB.. 3.375 Gram(s) IV Intermittent every 8 hours  polyethylene glycol 3350 17 Gram(s) Oral daily  propofol Infusion 10 MICROgram(s)/kG/Min (5.62 mL/Hr) IV Continuous <Continuous>  senna 2 Tablet(s) Oral at bedtime    MEDICATIONS  (PRN):  acetaminophen     Tablet .. 650 milliGRAM(s) Oral every 6 hours PRN Temp greater or equal to 38C (100.4F), Mild Pain (1 - 3)  aluminum hydroxide/magnesium hydroxide/simethicone Suspension 30 milliLiter(s) Oral every 4 hours PRN Dyspepsia  heparin   Injectable 7500 Unit(s) IV Push every 6 hours PRN For aPTT less than 40  heparin   Injectable 3500 Unit(s) IV Push every 6 hours PRN For aPTT between 40 - 57  melatonin 3 milliGRAM(s) Oral at bedtime PRN Insomnia  ondansetron Injectable 4 milliGRAM(s) IV Push every 8 hours PRN Nausea and/or Vomiting

## 2025-02-24 NOTE — PROGRESS NOTE ADULT - PROBLEM SELECTOR PLAN 5
- Gabapentin 150 mg q12h (home med)   - Keppra 750 mg BID (home med)   - Lacosamide 100 mg BID (home med)   - Lurasidone 40 mg daily (home med)   - Lamotrigine 100 mg BID (home med)  - Escitalopram 15 mg daily (home med)  - medicaion via OG tube while intubated

## 2025-02-25 LAB
ALBUMIN SERPL ELPH-MCNC: 2.4 G/DL — LOW (ref 3.3–5)
ALBUMIN SERPL ELPH-MCNC: 2.7 G/DL — LOW (ref 3.3–5)
ALP SERPL-CCNC: 120 U/L — SIGNIFICANT CHANGE UP (ref 40–120)
ALP SERPL-CCNC: 129 U/L — HIGH (ref 40–120)
ALT FLD-CCNC: 30 U/L — SIGNIFICANT CHANGE UP (ref 10–45)
ALT FLD-CCNC: 39 U/L — SIGNIFICANT CHANGE UP (ref 10–45)
ANION GAP SERPL CALC-SCNC: 17 MMOL/L — SIGNIFICANT CHANGE UP (ref 5–17)
ANION GAP SERPL CALC-SCNC: 17 MMOL/L — SIGNIFICANT CHANGE UP (ref 5–17)
APTT BLD: 78.7 SEC — HIGH (ref 24.5–35.6)
AST SERPL-CCNC: 40 U/L — SIGNIFICANT CHANGE UP (ref 10–40)
AST SERPL-CCNC: 41 U/L — HIGH (ref 10–40)
BASOPHILS # BLD AUTO: 0.01 K/UL — SIGNIFICANT CHANGE UP (ref 0–0.2)
BASOPHILS NFR BLD AUTO: 0.1 % — SIGNIFICANT CHANGE UP (ref 0–2)
BILIRUB SERPL-MCNC: 0.4 MG/DL — SIGNIFICANT CHANGE UP (ref 0.2–1.2)
BILIRUB SERPL-MCNC: 0.4 MG/DL — SIGNIFICANT CHANGE UP (ref 0.2–1.2)
BUN SERPL-MCNC: 67 MG/DL — HIGH (ref 7–23)
BUN SERPL-MCNC: 70 MG/DL — HIGH (ref 7–23)
CALCIUM SERPL-MCNC: 9.4 MG/DL — SIGNIFICANT CHANGE UP (ref 8.4–10.5)
CALCIUM SERPL-MCNC: 9.7 MG/DL — SIGNIFICANT CHANGE UP (ref 8.4–10.5)
CHLORIDE SERPL-SCNC: 104 MMOL/L — SIGNIFICANT CHANGE UP (ref 96–108)
CHLORIDE SERPL-SCNC: 110 MMOL/L — HIGH (ref 96–108)
CO2 SERPL-SCNC: 19 MMOL/L — LOW (ref 22–31)
CO2 SERPL-SCNC: 22 MMOL/L — SIGNIFICANT CHANGE UP (ref 22–31)
CREAT SERPL-MCNC: 4.14 MG/DL — HIGH (ref 0.5–1.3)
CREAT SERPL-MCNC: 4.24 MG/DL — HIGH (ref 0.5–1.3)
EGFR: 15 ML/MIN/1.73M2 — LOW
EOSINOPHIL # BLD AUTO: 0.14 K/UL — SIGNIFICANT CHANGE UP (ref 0–0.5)
EOSINOPHIL NFR BLD AUTO: 1.5 % — SIGNIFICANT CHANGE UP (ref 0–6)
GAS PNL BLDA: SIGNIFICANT CHANGE UP
GLUCOSE SERPL-MCNC: 138 MG/DL — HIGH (ref 70–99)
GLUCOSE SERPL-MCNC: 177 MG/DL — HIGH (ref 70–99)
HCT VFR BLD CALC: 24 % — LOW (ref 39–50)
HGB BLD-MCNC: 7.4 G/DL — LOW (ref 13–17)
IMM GRANULOCYTES NFR BLD AUTO: 1.1 % — HIGH (ref 0–0.9)
INR BLD: 1.03 RATIO — SIGNIFICANT CHANGE UP (ref 0.85–1.16)
LYMPHOCYTES # BLD AUTO: 0.55 K/UL — LOW (ref 1–3.3)
LYMPHOCYTES # BLD AUTO: 6 % — LOW (ref 13–44)
MAGNESIUM SERPL-MCNC: 2.2 MG/DL — SIGNIFICANT CHANGE UP (ref 1.6–2.6)
MAGNESIUM SERPL-MCNC: 2.2 MG/DL — SIGNIFICANT CHANGE UP (ref 1.6–2.6)
MCHC RBC-ENTMCNC: 30.8 G/DL — LOW (ref 32–36)
MCHC RBC-ENTMCNC: 31.9 PG — SIGNIFICANT CHANGE UP (ref 27–34)
MCV RBC AUTO: 103.4 FL — HIGH (ref 80–100)
MONOCYTES # BLD AUTO: 0.15 K/UL — SIGNIFICANT CHANGE UP (ref 0–0.9)
MONOCYTES NFR BLD AUTO: 1.6 % — LOW (ref 2–14)
NEUTROPHILS # BLD AUTO: 8.22 K/UL — HIGH (ref 1.8–7.4)
NEUTROPHILS NFR BLD AUTO: 89.7 % — HIGH (ref 43–77)
NRBC BLD AUTO-RTO: 0 /100 WBCS — SIGNIFICANT CHANGE UP (ref 0–0)
PHOSPHATE SERPL-MCNC: 4.6 MG/DL — HIGH (ref 2.5–4.5)
PHOSPHATE SERPL-MCNC: 4.6 MG/DL — HIGH (ref 2.5–4.5)
PLATELET # BLD AUTO: 98 K/UL — LOW (ref 150–400)
POTASSIUM SERPL-MCNC: 3.5 MMOL/L — SIGNIFICANT CHANGE UP (ref 3.5–5.3)
POTASSIUM SERPL-MCNC: 4.4 MMOL/L — SIGNIFICANT CHANGE UP (ref 3.5–5.3)
POTASSIUM SERPL-SCNC: 3.5 MMOL/L — SIGNIFICANT CHANGE UP (ref 3.5–5.3)
POTASSIUM SERPL-SCNC: 4.4 MMOL/L — SIGNIFICANT CHANGE UP (ref 3.5–5.3)
PROT SERPL-MCNC: 6 G/DL — SIGNIFICANT CHANGE UP (ref 6–8.3)
PROT SERPL-MCNC: 6.7 G/DL — SIGNIFICANT CHANGE UP (ref 6–8.3)
PROTHROM AB SERPL-ACNC: 11.7 SEC — SIGNIFICANT CHANGE UP (ref 9.9–13.4)
RBC # BLD: 2.32 M/UL — LOW (ref 4.2–5.8)
RBC # FLD: 15.5 % — HIGH (ref 10.3–14.5)
SODIUM SERPL-SCNC: 143 MMOL/L — SIGNIFICANT CHANGE UP (ref 135–145)
SODIUM SERPL-SCNC: 146 MMOL/L — HIGH (ref 135–145)
WBC # BLD: 9.17 K/UL — SIGNIFICANT CHANGE UP (ref 3.8–10.5)
WBC # FLD AUTO: 9.17 K/UL — SIGNIFICANT CHANGE UP (ref 3.8–10.5)

## 2025-02-25 PROCEDURE — 99291 CRITICAL CARE FIRST HOUR: CPT

## 2025-02-25 RX ADMIN — Medication 15 MILLILITER(S): at 17:42

## 2025-02-25 RX ADMIN — Medication 40 MILLIGRAM(S): at 11:03

## 2025-02-25 RX ADMIN — ATORVASTATIN CALCIUM 20 MILLIGRAM(S): 80 TABLET, FILM COATED ORAL at 21:20

## 2025-02-25 RX ADMIN — LACOSAMIDE 140 MILLIGRAM(S): 150 TABLET, FILM COATED ORAL at 05:25

## 2025-02-25 RX ADMIN — Medication 2 TABLET(S): at 21:20

## 2025-02-25 RX ADMIN — NALOXEGOL OXALATE 12.5 MILLIGRAM(S): 12.5 TABLET, FILM COATED ORAL at 11:03

## 2025-02-25 RX ADMIN — Medication 15 MILLILITER(S): at 05:25

## 2025-02-25 RX ADMIN — ESCITALOPRAM OXALATE 15 MILLIGRAM(S): 20 TABLET ORAL at 08:16

## 2025-02-25 RX ADMIN — IPRATROPIUM BROMIDE AND ALBUTEROL SULFATE 3 MILLILITER(S): .5; 2.5 SOLUTION RESPIRATORY (INHALATION) at 11:51

## 2025-02-25 RX ADMIN — Medication 25 GRAM(S): at 21:21

## 2025-02-25 RX ADMIN — LAMOTRIGINE 25 MILLIGRAM(S): 150 TABLET ORAL at 05:24

## 2025-02-25 RX ADMIN — GABAPENTIN 150 MILLIGRAM(S): 400 CAPSULE ORAL at 05:24

## 2025-02-25 RX ADMIN — POLYETHYLENE GLYCOL 3350 17 GRAM(S): 17 POWDER, FOR SOLUTION ORAL at 11:03

## 2025-02-25 RX ADMIN — Medication 25 GRAM(S): at 05:24

## 2025-02-25 RX ADMIN — LEVETIRACETAM 400 MILLIGRAM(S): 10 INJECTION, SOLUTION INTRAVENOUS at 13:07

## 2025-02-25 RX ADMIN — Medication 40 MILLIEQUIVALENT(S): at 02:17

## 2025-02-25 RX ADMIN — LAMOTRIGINE 25 MILLIGRAM(S): 150 TABLET ORAL at 17:42

## 2025-02-25 RX ADMIN — LACOSAMIDE 140 MILLIGRAM(S): 150 TABLET, FILM COATED ORAL at 17:42

## 2025-02-25 RX ADMIN — LEVETIRACETAM 400 MILLIGRAM(S): 10 INJECTION, SOLUTION INTRAVENOUS at 21:22

## 2025-02-25 RX ADMIN — IPRATROPIUM BROMIDE AND ALBUTEROL SULFATE 3 MILLILITER(S): .5; 2.5 SOLUTION RESPIRATORY (INHALATION) at 05:14

## 2025-02-25 RX ADMIN — IPRATROPIUM BROMIDE AND ALBUTEROL SULFATE 3 MILLILITER(S): .5; 2.5 SOLUTION RESPIRATORY (INHALATION) at 17:10

## 2025-02-25 RX ADMIN — Medication 25 GRAM(S): at 13:07

## 2025-02-25 RX ADMIN — LEVETIRACETAM 400 MILLIGRAM(S): 10 INJECTION, SOLUTION INTRAVENOUS at 05:24

## 2025-02-25 RX ADMIN — MIDODRINE HYDROCHLORIDE 10 MILLIGRAM(S): 5 TABLET ORAL at 05:25

## 2025-02-25 RX ADMIN — IPRATROPIUM BROMIDE AND ALBUTEROL SULFATE 3 MILLILITER(S): .5; 2.5 SOLUTION RESPIRATORY (INHALATION) at 23:08

## 2025-02-25 RX ADMIN — HEPARIN SODIUM 1500 UNIT(S)/HR: 1000 INJECTION INTRAVENOUS; SUBCUTANEOUS at 01:47

## 2025-02-25 RX ADMIN — Medication 1000 UNIT(S): at 11:03

## 2025-02-25 RX ADMIN — GABAPENTIN 150 MILLIGRAM(S): 400 CAPSULE ORAL at 17:42

## 2025-02-25 RX ADMIN — DEXAMETHASONE 6 MILLIGRAM(S): 0.5 TABLET ORAL at 05:24

## 2025-02-25 RX ADMIN — LURASIDONE HYDROCHLORIDE 40 MILLIGRAM(S): 120 TABLET, FILM COATED ORAL at 21:20

## 2025-02-25 NOTE — PROGRESS NOTE ADULT - PROBLEM SELECTOR PLAN 7
Continue with home atorvastatin 20 mg bedtime for HLD, vitamin D supplementation, pantoprazole 40 mg daily, senna 2 tab bedtime, and Miralax 17 gram daily.     General  - DVT prophylaxis: heparin 5000 units q8h --> hep gtt per ICU  - Diet: regular   - Medication reconciliation: complete   - Code: Full   - Medications: Tylenol 650 mg q6h as needed for pain, Maalox 30 mL q4h as needed for acid reflux, melatonin 3 mg bedtime as needed for insomnia, Zofran 4 mg IV q8h as needed for nausea/vomiting      2/16/25 --> plan was discussed with patient's brother Fernando (over the phone, [phone]) in detail. He agrees with plan. All questions answered. He asked that I update Ester (sister, [phone]); she was called and updated as well, also agrees with the plan, also answered all questions.

## 2025-02-25 NOTE — PROGRESS NOTE ADULT - ASSESSMENT
69 year old male with a past medical history of hypertension, hyperlipemia VAZQUEZ (on CPAP at bedtime, NC 2 liters during the day), CKD stage 3, epilepsy, schizophrenia, developmental delay, metastatic testicular cancer (s/p orchiectomy, actively on chemotherapy, last dose of etoposide/cisplatin on 6/2024) presenting with worsening shortness of breath. Patient was recently hospitalized at [facility] from January 27th 2025 to February 6th 2025 for seizure and influenza virus infection, which required intubation. He was sent to rehab (originally from home) from hospital. He returns due to sudden onset shortness of breath and worsening oxygenation. Of note, he tested positive for COVID-19       1- SHAGUFTA on CKDIII  2- covid-19  3- anemia  4- hypotension   5- respiratory failure       SHAGUFTA suspected in setting of new infection. covid 19   cr is now worsening   bumex iv as needed to keep O> I   hypernatremia start free water    free water 250 cc q6   decadron 6 mg iv daily   suspect superimposed pna tx with zosyn 3.375 g iv tid   levophed drip to maintain bp   anemia, trend hgb  d.w icu team when seen earlier

## 2025-02-25 NOTE — PROGRESS NOTE ADULT - SUBJECTIVE AND OBJECTIVE BOX
DATE OF SERVICE: 02-25-25 @ 14:05    Patient is a 69y old  Male who presents with a chief complaint of Acute on chronic hypoxemic respiratory failure (25 Feb 2025 11:45)      INTERVAL HISTORY: Intubated in no acute distress.    REVIEW OF SYSTEMS:  CONSTITUTIONAL: No weakness  EYES/ENT: No visual changes;  No throat pain   NECK: No pain or stiffness  RESPIRATORY: No cough, wheezing; No shortness of breath  CARDIOVASCULAR: No chest pain or palpitations  GASTROINTESTINAL: No abdominal  pain. No nausea, vomiting, or hematemesis  GENITOURINARY: No dysuria, frequency or hematuria  NEUROLOGICAL: No stroke like symptoms  SKIN: No rashes    TELEMETRY Personally reviewed: SR with pAF  	  MEDICATIONS:  buMETAnide Infusion 2 mG/Hr IV Continuous <Continuous>        PHYSICAL EXAM:  T(C): 37.6 (02-25-25 @ 12:00), Max: 37.6 (02-25-25 @ 12:00)  HR: 95 (02-25-25 @ 13:00) (82 - 107)  BP: 143/69 (02-25-25 @ 13:00) (82/47 - 162/74)  RR: 21 (02-25-25 @ 13:00) (17 - 36)  SpO2: 95% (02-25-25 @ 13:00) (90% - 100%)  Wt(kg): --  I&O's Summary    24 Feb 2025 07:01  -  25 Feb 2025 07:00  --------------------------------------------------------  IN: 3613 mL / OUT: 1860 mL / NET: 1753 mL    25 Feb 2025 07:01  -  25 Feb 2025 14:05  --------------------------------------------------------  IN: 722.2 mL / OUT: 1460 mL / NET: -737.8 mL          Appearance: In no distress	  HEENT:    PERRL, EOMI	  Cardiovascular:  S1 S2, No JVD  Respiratory: Lungs clear to auscultation	  Gastrointestinal:  Soft, Non-tender, + BS	  Vascularature:  No edema of LE  Psychiatric: Appropriate affect   Neuro: no acute focal deficits                               7.4    9.17  )-----------( 98       ( 25 Feb 2025 00:59 )             24.0     02-25    146[H]  |  110[H]  |  67[H]  ----------------------------<  138[H]  3.5   |  19[L]  |  4.24[H]    Ca    9.4      25 Feb 2025 00:59  Phos  4.6     02-25  Mg     2.2     02-25    TPro  6.0  /  Alb  2.4[L]  /  TBili  0.4  /  DBili  x   /  AST  40  /  ALT  30  /  AlkPhos  120  02-25        Labs personally reviewed      ASSESSMENT/PLAN: 	      69 year old male with a past medical history of hypertension, hyperlipemia VAZQUEZ (on CPAP at bedtime, NC 2 liters during the day), CKD stage 3, epilepsy, schizophrenia, developmental delay, metastatic testicular cancer (s/p orchiectomy, actively on chemotherapy, last dose of etoposide/cisplatin on 6/2024), presenting with acute on chronic hypoxemic respiratory failure, secondary to (a) COVID-19 infection, (b) superimposed pneumonia after recent influenza infection, and/or (c) fluid overload.     Problem/Plan - 1:  ·  Problem: Acute on chronic respiratory failure with hypoxia.   ·  Plan: Likely 2/2 PNA, HF  - Appreciate pulmonology.  - Transferred to MICU for hypoxia. Appreciate MICU care.   - 2/24 started on Bumex gtt at 2mg/hr    Problem/Plan - 2:  ·  Problem: SHAGUFTA (acute kidney injury).   ·  Plan: Has a history of CKD stage 3, Cr 2.38 at baseline. Presents with Cr 3.27.   - Nephrology following  - Will trend closely while on Bumex gtt     Problem/Plan - 3:  ·  Problem: Chronic heart failure with preserved ejection fraction.   ·  Plan: TTE done here 1/17/25 technically difficult, but LV systolic fxn appears nl without any regional wall motion abnormalities  - Repeat TTE pending  - BNP 5000 <---1100 but appears euvolemic   - 2/24 decreased UOP and net positive, bumex gtt started at 2mg/hr for goal -1-2 L,  per MICU     Problem/Plan - 4:  ·  Problem: New onset Afib RVR (on tele, confirmed with EKG 2/19)   ·  Plan: Started metoprolol 5mg IVP q6 hours & Hep gtt  - If rate remains uncontrolled, can start Cardizem drip at 5mg/h  - Initiated hep gtt and will transition to Eliquis     Problem/Plan - 5:  ·  Problem: HTN    ·  Plan: Start Hydral 25mg TID & amlodipine 5mg for uncontrolled BP (2/19)  - improved  - Hold BP meds for shock state. Can resume when improved.     Problem/Plan - 6:  ·  Problem: Prophylactic measure.   ·  Plan:  DVT prophylaxis: heparin gtt        Magaly Laird, REILLY-NP   Gus Andrew DO [facility]  Cardiovascular Medicine  [address], [address]  Available through call or text on [employer] TE[employer]  Office: [phone]   DATE OF SERVICE: 02-25-25 @ 14:05    Patient is a 69y old  Male who presents with a chief complaint of Acute on chronic hypoxemic respiratory failure (25 Feb 2025 11:45)      INTERVAL HISTORY: Intubated in no acute distress.    REVIEW OF SYSTEMS:  CONSTITUTIONAL: No weakness  EYES/ENT: No visual changes;  No throat pain   NECK: No pain or stiffness  RESPIRATORY: No cough, wheezing; No shortness of breath  CARDIOVASCULAR: No chest pain or palpitations  GASTROINTESTINAL: No abdominal  pain. No nausea, vomiting, or hematemesis  GENITOURINARY: No dysuria, frequency or hematuria  NEUROLOGICAL: No stroke like symptoms  SKIN: No rashes    TELEMETRY Personally reviewed: SR with pAF  	  MEDICATIONS:  buMETAnide Infusion 2 mG/Hr IV Continuous <Continuous>        PHYSICAL EXAM:  T(C): 37.6 (02-25-25 @ 12:00), Max: 37.6 (02-25-25 @ 12:00)  HR: 95 (02-25-25 @ 13:00) (82 - 107)  BP: 143/69 (02-25-25 @ 13:00) (82/47 - 162/74)  RR: 21 (02-25-25 @ 13:00) (17 - 36)  SpO2: 95% (02-25-25 @ 13:00) (90% - 100%)  Wt(kg): --  I&O's Summary    24 Feb 2025 07:01  -  25 Feb 2025 07:00  --------------------------------------------------------  IN: 3613 mL / OUT: 1860 mL / NET: 1753 mL    25 Feb 2025 07:01  -  25 Feb 2025 14:05  --------------------------------------------------------  IN: 722.2 mL / OUT: 1460 mL / NET: -737.8 mL          Appearance: In no distress	  HEENT:    PERRL, EOMI	  Cardiovascular:  S1 S2, No JVD  Respiratory: Lungs clear to auscultation	  Gastrointestinal:  Soft, Non-tender, + BS	  Vascularature:  No edema of LE  Psychiatric: Appropriate affect   Neuro: no acute focal deficits                               7.4    9.17  )-----------( 98       ( 25 Feb 2025 00:59 )             24.0     02-25    146[H]  |  110[H]  |  67[H]  ----------------------------<  138[H]  3.5   |  19[L]  |  4.24[H]    Ca    9.4      25 Feb 2025 00:59  Phos  4.6     02-25  Mg     2.2     02-25    TPro  6.0  /  Alb  2.4[L]  /  TBili  0.4  /  DBili  x   /  AST  40  /  ALT  30  /  AlkPhos  120  02-25        Labs personally reviewed      ASSESSMENT/PLAN: 	    69 year old male with a past medical history of hypertension, hyperlipemia VAZQUEZ (on CPAP at bedtime, NC 2 liters during the day), CKD stage 3, epilepsy, schizophrenia, developmental delay, metastatic testicular cancer (s/p orchiectomy, actively on chemotherapy, last dose of etoposide/cisplatin on 6/2024), presenting with acute on chronic hypoxemic respiratory failure, secondary to (a) COVID-19 infection, (b) superimposed pneumonia after recent influenza infection, and/or (c) fluid overload.     Problem/Plan - 1:  ·  Problem: Acute on chronic respiratory failure with hypoxia.   ·  Plan: Likely 2/2 PNA, HF  - Appreciate pulmonology.  - Transferred to MICU for hypoxia. Appreciate MICU care.   - 2/24 started on Bumex gtt at 2mg/hr    Problem/Plan - 2:  ·  Problem: SHAGUFTA (acute kidney injury).   ·  Plan: Has a history of CKD stage 3, Cr 2.38 at baseline. Presents with Cr 3.27.   - Nephrology following  - Will trend closely while on Bumex gtt     Problem/Plan - 3:  ·  Problem: Chronic heart failure with preserved ejection fraction.   ·  Plan: TTE done here 1/17/25 technically difficult, but LV systolic fxn appears nl without any regional wall motion abnormalities  - Repeat TTE pending  - BNP 5000 <---1100 but appears euvolemic   - 2/24 decreased UOP and net positive, bumex gtt started at 2mg/hr for goal -1-2 L,  per MICU     Problem/Plan - 4:  ·  Problem: New onset Afib RVR (on tele, confirmed with EKG 2/19)   ·  Plan: Started metoprolol 5mg IVP q6 hours & Hep gtt  - If rate remains uncontrolled, can start Cardizem drip at 5mg/h  - Initiated hep gtt and will transition to Eliquis     Problem/Plan - 5:  ·  Problem: HTN    ·  Plan: Start Hydral 25mg TID & amlodipine 5mg for uncontrolled BP (2/19)  - improved  - Hold BP meds for shock state. Can resume when improved.     Problem/Plan - 6:  ·  Problem: Prophylactic measure.   ·  Plan:  DVT prophylaxis: heparin gtt        Magaly Laird, REILLY-NP   Gus Andrew DO [facility]  Cardiovascular Medicine  [address], [address]  Available through call or text on [employer] TEAM[employer]  Office: [phone]

## 2025-02-25 NOTE — PROGRESS NOTE ADULT - ASSESSMENT
68 y/o M with PMH of HTN, HLD, VAZQUEZ on CPAP and home O2 (2LNC daytime), CKD, epilepsy, schizophrenia developmental delay, metastatic testicular CA. Recent admission at [facility] for acute respiratory failure in the setting of seizures, influenza infection, pulmonary edema, PNA requiring intubation in MICU. Was eventually discharged to rehab. Presents now with SOB, found to be COVID + at facility on 2/13. Febrile on admission to ED to 101.9F. Placed on Bipap for increased WOB. Pulmonary called to consult for acute respiratory failure.

## 2025-02-25 NOTE — PROGRESS NOTE ADULT - PROBLEM SELECTOR PLAN 1
-Recent admission for acute respiratory failure in the setting of grand mal seizures, influenza, PNA. S/p intubation in MICU, was extubated to AVAPS  -Now COVID +  -CXR with low lung volumes, mild congestion. Possible underlying infiltrate  -CT chest with b/l GGO, new since 1/30/25. Likely COVID PNA +/- superimposed bacterial PNA.   -Started on HFNC 2/18  -S/p multiple RRTs for hypoxia, increased WOB. Intubated s/p RRT on 2/23 & transferred to MICU  -Continue bronchodilators  -ABX per ID/MICU  -Pressure support trials as tolerated  -Keep sats >90%.

## 2025-02-25 NOTE — PROGRESS NOTE ADULT - PROBLEM SELECTOR PLAN 1
?superimposed PNA on top of COVID-19?  - Zosyn 4.5 g q12h, renally dosed  - Vancomycin discontinued  - Decadron 6mg IVPB daily x 10 days for COVID-19   - complete Remdesivir IV   - Maintain O2 sats above 92%  - BiPaP daytime prn   - BiPaP at bedtime   - Blood cultures x 2 (-) so far  - MRSA swab negative  - Sputum culture --> few gram variable rods and rare G (+) cocci in pairs  - Legionella Ur Ag (-)  - Streptococcus pneumoniae testing  - Appreciate pulmonology  - bumex gtt per ICU team, monitor urine output

## 2025-02-25 NOTE — PROGRESS NOTE ADULT - ATTENDING COMMENTS
68 y/o M w/hx as above including metastatic testicular cancer, epilepsy on AEDs, chronic respiratory failure with hypoxia and hypercapnia on home O2, HFpEF admitted for acute on chronic respiratory failure with hypoxia and hypercapnia likely secondary to combination of COVID PNA, bacterial superinfection, and acute pulmonary edema due to acute on chronic HFpEF. Hypotension likely severe sepsis with septic shock. SHAGUFTA likely ATN +/- venous congestion.    # Acute on chronic respiratory failure with hypoxia and hypercapnia  # COVID PNA  # Bacterial PNA  # Acute pulmonary edema  # Acute on chronic HFpEF  # Hypotension  # Severe sepsis with septic shock  # SHAGUFTA  # Hyperntremia  # Epilepsy    - Continue AEDs  - Sedation as needed goal RASS -1 to 0  - Wean from vent as tolerated  - Titrate pressors goal MAP >= 65  - Trend Cr, avoid nephrotoxins  - Diuresis goal net negative 1-2 L  - Complete course of abx  - Complete course of dexamethasone for COVID, completed remdesivir  - Free water for hypernatremia  - DVT prophylaxis

## 2025-02-25 NOTE — PROGRESS NOTE ADULT - SUBJECTIVE AND OBJECTIVE BOX
[facility] HEMATOLOGY/ONCOLOGY INPATIENT PROGRESS NOTE     Interval Hx:   02-25-25: Mr. Gr was seen at bedside today.    Meds:   MEDICATIONS  (STANDING):  albuterol/ipratropium for Nebulization 3 milliLiter(s) Nebulizer every 6 hours  atorvastatin 20 milliGRAM(s) Oral at bedtime  buMETAnide Infusion 2 mG/Hr (10 mL/Hr) IV Continuous <Continuous>  chlorhexidine 0.12% Liquid 15 milliLiter(s) Oral Mucosa every 12 hours  cholecalciferol 1000 Unit(s) Oral daily  dexAMETHasone  Injectable 6 milliGRAM(s) IV Push daily  escitalopram 15 milliGRAM(s) Oral with breakfast  gabapentin Solution 150 milliGRAM(s) Oral every 12 hours  heparin  Infusion.  Unit(s)/Hr (17 mL/Hr) IV Continuous <Continuous>  influenza  Vaccine (HIGH DOSE) 0.5 milliLiter(s) IntraMuscular once  lacosamide IVPB 200 milliGRAM(s) IV Intermittent every 12 hours  lamoTRIgine 25 milliGRAM(s) Oral two times a day  levETIRAcetam  IVPB 1000 milliGRAM(s) IV Intermittent <User Schedule>  lurasidone 40 milliGRAM(s) Oral at bedtime  midodrine 10 milliGRAM(s) Oral every 8 hours  naloxegol 12.5 milliGRAM(s) Oral daily  pantoprazole    Tablet 40 milliGRAM(s) Oral before breakfast  piperacillin/tazobactam IVPB.. 3.375 Gram(s) IV Intermittent every 8 hours  polyethylene glycol 3350 17 Gram(s) Oral daily  propofol Infusion 10 MICROgram(s)/kG/Min (5.62 mL/Hr) IV Continuous <Continuous>  senna 2 Tablet(s) Oral at bedtime    MEDICATIONS  (PRN):  acetaminophen     Tablet .. 650 milliGRAM(s) Oral every 6 hours PRN Temp greater or equal to 38C (100.4F), Mild Pain (1 - 3)  aluminum hydroxide/magnesium hydroxide/simethicone Suspension 30 milliLiter(s) Oral every 4 hours PRN Dyspepsia  melatonin 3 milliGRAM(s) Oral at bedtime PRN Insomnia    Vital Signs Last 24 Hrs  T(C): 37.4 (25 Feb 2025 04:00), Max: 37.5 (24 Feb 2025 08:00)  T(F): 99.3 (25 Feb 2025 04:00), Max: 99.5 (24 Feb 2025 08:00)  HR: 107 (25 Feb 2025 05:00) (80 - 107)  BP: 111/55 (25 Feb 2025 05:00) (82/47 - 134/66)  BP(mean): 77 (25 Feb 2025 05:00) (58 - 94)  RR: 23 (25 Feb 2025 05:00) (16 - 36)  SpO2: 92% (25 Feb 2025 05:00) (90% - 100%)    Parameters below as of 24 Feb 2025 23:13  Patient On (Oxygen Delivery Method): ventilator    Physical Exam:  Gen: Intubated  HEENT: EOMI, MMM  Chest: equal chest rise  Cardiac: regular   Abd: non distended   Neuro: sedated     Labs:                        7.4    9.17  )-----------( 98       ( 25 Feb 2025 00:59 )             24.0     CBC Full  -  ( 25 Feb 2025 00:59 )  WBC Count : 9.17 K/uL  RBC Count : 2.32 M/uL  Hemoglobin : 7.4 g/dL  Hematocrit : 24.0 %  Platelet Count - Automated : 98 K/uL  Mean Cell Volume : 103.4 fl  Mean Cell Hemoglobin : 31.9 pg  Mean Cell Hemoglobin Concentration : 30.8 g/dL    02-25    146[H]  |  110[H]  |  67[H]  ----------------------------<  138[H]  3.5   |  19[L]  |  4.24[H]    Ca    9.4      25 Feb 2025 00:59  Phos  4.6     02-25  Mg     2.2     02-25    TPro  6.0  /  Alb  2.4[L]  /  TBili  0.4  /  DBili  x   /  AST  40  /  ALT  30  /  AlkPhos  120  02-25    PT/INR - ( 25 Feb 2025 00:59 )   PT: 11.7 sec;   INR: 1.03 ratio         PTT - ( 25 Feb 2025 00:59 )  PTT:78.7 sec  Bilirubin Total: 0.4 mg/dL (02-25-25 @ 00:59)  Bilirubin Total: 0.4 mg/dL (02-24-25 @ 15:24)  Bilirubin Total: 0.6 mg/dL (02-24-25 @ 11:38)   [facility] HEMATOLOGY/ONCOLOGY INPATIENT PROGRESS NOTE     Interval Hx:   02-25-25: Mr. Gr was seen at bedside today, continues in MICU, intubated and sedated, no signs of bleeding    Meds:   MEDICATIONS  (STANDING):  albuterol/ipratropium for Nebulization 3 milliLiter(s) Nebulizer every 6 hours  atorvastatin 20 milliGRAM(s) Oral at bedtime  buMETAnide Infusion 2 mG/Hr (10 mL/Hr) IV Continuous <Continuous>  chlorhexidine 0.12% Liquid 15 milliLiter(s) Oral Mucosa every 12 hours  cholecalciferol 1000 Unit(s) Oral daily  dexAMETHasone  Injectable 6 milliGRAM(s) IV Push daily  escitalopram 15 milliGRAM(s) Oral with breakfast  gabapentin Solution 150 milliGRAM(s) Oral every 12 hours  heparin  Infusion.  Unit(s)/Hr (17 mL/Hr) IV Continuous <Continuous>  influenza  Vaccine (HIGH DOSE) 0.5 milliLiter(s) IntraMuscular once  lacosamide IVPB 200 milliGRAM(s) IV Intermittent every 12 hours  lamoTRIgine 25 milliGRAM(s) Oral two times a day  levETIRAcetam  IVPB 1000 milliGRAM(s) IV Intermittent <User Schedule>  lurasidone 40 milliGRAM(s) Oral at bedtime  midodrine 10 milliGRAM(s) Oral every 8 hours  naloxegol 12.5 milliGRAM(s) Oral daily  pantoprazole    Tablet 40 milliGRAM(s) Oral before breakfast  piperacillin/tazobactam IVPB.. 3.375 Gram(s) IV Intermittent every 8 hours  polyethylene glycol 3350 17 Gram(s) Oral daily  propofol Infusion 10 MICROgram(s)/kG/Min (5.62 mL/Hr) IV Continuous <Continuous>  senna 2 Tablet(s) Oral at bedtime    MEDICATIONS  (PRN):  acetaminophen     Tablet .. 650 milliGRAM(s) Oral every 6 hours PRN Temp greater or equal to 38C (100.4F), Mild Pain (1 - 3)  aluminum hydroxide/magnesium hydroxide/simethicone Suspension 30 milliLiter(s) Oral every 4 hours PRN Dyspepsia  melatonin 3 milliGRAM(s) Oral at bedtime PRN Insomnia    Vital Signs Last 24 Hrs  T(C): 37.4 (25 Feb 2025 04:00), Max: 37.5 (24 Feb 2025 08:00)  T(F): 99.3 (25 Feb 2025 04:00), Max: 99.5 (24 Feb 2025 08:00)  HR: 107 (25 Feb 2025 05:00) (80 - 107)  BP: 111/55 (25 Feb 2025 05:00) (82/47 - 134/66)  BP(mean): 77 (25 Feb 2025 05:00) (58 - 94)  RR: 23 (25 Feb 2025 05:00) (16 - 36)  SpO2: 92% (25 Feb 2025 05:00) (90% - 100%)    Parameters below as of 24 Feb 2025 23:13  Patient On (Oxygen Delivery Method): ventilator    Physical Exam:  Gen: Intubated  HEENT: EOMI, MMM  Chest: equal chest rise  Cardiac: regular   Abd: non distended   Neuro: sedated     Labs:                        7.4    9.17  )-----------( 98       ( 25 Feb 2025 00:59 )             24.0     CBC Full  -  ( 25 Feb 2025 00:59 )  WBC Count : 9.17 K/uL  RBC Count : 2.32 M/uL  Hemoglobin : 7.4 g/dL  Hematocrit : 24.0 %  Platelet Count - Automated : 98 K/uL  Mean Cell Volume : 103.4 fl  Mean Cell Hemoglobin : 31.9 pg  Mean Cell Hemoglobin Concentration : 30.8 g/dL    02-25    146[H]  |  110[H]  |  67[H]  ----------------------------<  138[H]  3.5   |  19[L]  |  4.24[H]    Ca    9.4      25 Feb 2025 00:59  Phos  4.6     02-25  Mg     2.2     02-25    TPro  6.0  /  Alb  2.4[L]  /  TBili  0.4  /  DBili  x   /  AST  40  /  ALT  30  /  AlkPhos  120  02-25    PT/INR - ( 25 Feb 2025 00:59 )   PT: 11.7 sec;   INR: 1.03 ratio         PTT - ( 25 Feb 2025 00:59 )  PTT:78.7 sec  Bilirubin Total: 0.4 mg/dL (02-25-25 @ 00:59)  Bilirubin Total: 0.4 mg/dL (02-24-25 @ 15:24)  Bilirubin Total: 0.6 mg/dL (02-24-25 @ 11:38)

## 2025-02-25 NOTE — PROGRESS NOTE ADULT - NSPROGADDITIONALINFOA_GEN_ALL_CORE
speech & swallow eval appreciated, s/p FEES: recommends thicken pureed diet.  events reviewed, RRT for hypoxia, intubated/sedated for increased work of breathing.   transferred to ICU. ICU eval appreciated  GOC discussed, full code per the sister.   Transferred to ICU, intubated.   started bumex gtt for overload.   weaned off pressors.   ICU care appreciated.     - Dr. SHIRA Leroy ([facility])  - [phone]

## 2025-02-25 NOTE — PROGRESS NOTE ADULT - SUBJECTIVE AND OBJECTIVE BOX
*******************************  Navya Michelle PGY-3  *******************************    INTERVAL HPI/OVERNIGHT EVENTS: Able to transition off of levophed with midodrine. Urine output augmented with Bumex drip    SUBJECTIVE: Patient seen and examined at bedside.     OBJECTIVE:    VITAL SIGNS:  ICU Vital Signs Last 24 Hrs  T(C): 37.4 (25 Feb 2025 04:00), Max: 37.4 (24 Feb 2025 16:00)  T(F): 99.3 (25 Feb 2025 04:00), Max: 99.3 (24 Feb 2025 16:00)  HR: 98 (25 Feb 2025 07:00) (82 - 107)  BP: 148/73 (25 Feb 2025 07:00) (82/47 - 148/73)  BP(mean): 105 (25 Feb 2025 07:00) (60 - 105)  ABP: --  ABP(mean): --  RR: 30 (25 Feb 2025 07:00) (16 - 36)  SpO2: 100% (25 Feb 2025 07:00) (90% - 100%)    O2 Parameters below as of 25 Feb 2025 05:27  Patient On (Oxygen Delivery Method): ventilator          Mode: AC/ CMV (Assist Control/ Continuous Mandatory Ventilation), RR (machine): 16, TV (machine): 500, FiO2: 40, PEEP: 8, ITime: 0.74, MAP: 13, PIP: 33    02-24 @ 07:01  -  02-25 @ 07:00  --------------------------------------------------------  IN: 3613 mL / OUT: 1860 mL / NET: 1753 mL      CAPILLARY BLOOD GLUCOSE        PHYSICAL EXAM:  GENERAL: Intubated sedated, increased WOB  HEENT: NCAT  NECK: supple without JVD  CHEST/LUNG: breath sounds bilaterally  CARDIOVASCULAR: regular rate and rhythm, normal S1 and S2, no murmur/rub/gallop  ABDOMEN: positive bowel sounds, non-tender, non-distended, no organomegaly   MUSCULOSKELETAL:  moving all four extremities   EXTREMITIES:  no lower extremity edema  NEUROLOGY: sedated      MEDICATIONS:  MEDICATIONS  (STANDING):  albuterol/ipratropium for Nebulization 3 milliLiter(s) Nebulizer every 6 hours  atorvastatin 20 milliGRAM(s) Oral at bedtime  buMETAnide Infusion 2 mG/Hr (10 mL/Hr) IV Continuous <Continuous>  chlorhexidine 0.12% Liquid 15 milliLiter(s) Oral Mucosa every 12 hours  cholecalciferol 1000 Unit(s) Oral daily  dexAMETHasone  Injectable 6 milliGRAM(s) IV Push daily  escitalopram 15 milliGRAM(s) Oral with breakfast  gabapentin Solution 150 milliGRAM(s) Oral every 12 hours  heparin  Infusion.  Unit(s)/Hr (17 mL/Hr) IV Continuous <Continuous>  influenza  Vaccine (HIGH DOSE) 0.5 milliLiter(s) IntraMuscular once  lacosamide IVPB 200 milliGRAM(s) IV Intermittent every 12 hours  lamoTRIgine 25 milliGRAM(s) Oral two times a day  levETIRAcetam  IVPB 1000 milliGRAM(s) IV Intermittent <User Schedule>  lurasidone 40 milliGRAM(s) Oral at bedtime  midodrine 10 milliGRAM(s) Oral every 8 hours  naloxegol 12.5 milliGRAM(s) Oral daily  pantoprazole   Suspension 40 milliGRAM(s) Oral daily  piperacillin/tazobactam IVPB.. 3.375 Gram(s) IV Intermittent every 8 hours  polyethylene glycol 3350 17 Gram(s) Oral daily  propofol Infusion 10 MICROgram(s)/kG/Min (5.62 mL/Hr) IV Continuous <Continuous>  senna 2 Tablet(s) Oral at bedtime    MEDICATIONS  (PRN):  acetaminophen     Tablet .. 650 milliGRAM(s) Oral every 6 hours PRN Temp greater or equal to 38C (100.4F), Mild Pain (1 - 3)  aluminum hydroxide/magnesium hydroxide/simethicone Suspension 30 milliLiter(s) Oral every 4 hours PRN Dyspepsia  melatonin 3 milliGRAM(s) Oral at bedtime PRN Insomnia      ALLERGIES:  Allergies    seasonal allergies (Unknown)  penicillin (Hives)    Intolerances    latex (Rash)      LABS:                        7.4    9.17  )-----------( 98       ( 25 Feb 2025 00:59 )             24.0     02-25    146[H]  |  110[H]  |  67[H]  ----------------------------<  138[H]  3.5   |  19[L]  |  4.24[H]    Ca    9.4      25 Feb 2025 00:59  Phos  4.6     02-25  Mg     2.2     02-25    TPro  6.0  /  Alb  2.4[L]  /  TBili  0.4  /  DBili  x   /  AST  40  /  ALT  30  /  AlkPhos  120  02-25    PT/INR - ( 25 Feb 2025 00:59 )   PT: 11.7 sec;   INR: 1.03 ratio         PTT - ( 25 Feb 2025 00:59 )  PTT:78.7 sec  Urinalysis Basic - ( 25 Feb 2025 00:59 )    Color: x / Appearance: x / SG: x / pH: x  Gluc: 138 mg/dL / Ketone: x  / Bili: x / Urobili: x   Blood: x / Protein: x / Nitrite: x   Leuk Esterase: x / RBC: x / WBC x   Sq Epi: x / Non Sq Epi: x / Bacteria: x        RADIOLOGY & ADDITIONAL TESTS: Reviewed.     *******************************  Navya Michelle PGY-3  *******************************    INTERVAL HPI/OVERNIGHT EVENTS: Able to transition off of levophed with midodrine. Urine output augmented with Bumex drip    SUBJECTIVE: Patient seen and examined at bedside. Unable to rouse to sternal rub    OBJECTIVE:    VITAL SIGNS:  ICU Vital Signs Last 24 Hrs  T(C): 37.4 (25 Feb 2025 04:00), Max: 37.4 (24 Feb 2025 16:00)  T(F): 99.3 (25 Feb 2025 04:00), Max: 99.3 (24 Feb 2025 16:00)  HR: 98 (25 Feb 2025 07:00) (82 - 107)  BP: 148/73 (25 Feb 2025 07:00) (82/47 - 148/73)  BP(mean): 105 (25 Feb 2025 07:00) (60 - 105)  ABP: --  ABP(mean): --  RR: 30 (25 Feb 2025 07:00) (16 - 36)  SpO2: 100% (25 Feb 2025 07:00) (90% - 100%)    O2 Parameters below as of 25 Feb 2025 05:27  Patient On (Oxygen Delivery Method): ventilator          Mode: AC/ CMV (Assist Control/ Continuous Mandatory Ventilation), RR (machine): 16, TV (machine): 500, FiO2: 40, PEEP: 8, ITime: 0.74, MAP: 13, PIP: 33    02-24 @ 07:01  -  02-25 @ 07:00  --------------------------------------------------------  IN: 3613 mL / OUT: 1860 mL / NET: 1753 mL      CAPILLARY BLOOD GLUCOSE        PHYSICAL EXAM:  GENERAL: Intubated sedated  HEENT: NCAT  NECK: supple without JVD  CHEST/LUNG: breath sounds bilaterally  CARDIOVASCULAR: regular rate and rhythm, normal S1 and S2, no murmur/rub/gallop  ABDOMEN: positive bowel sounds, non-tender, non-distended, no organomegaly   MUSCULOSKELETAL:  moving all four extremities   EXTREMITIES: no lower extremity edema  NEUROLOGY: sedated      MEDICATIONS:  MEDICATIONS  (STANDING):  albuterol/ipratropium for Nebulization 3 milliLiter(s) Nebulizer every 6 hours  atorvastatin 20 milliGRAM(s) Oral at bedtime  buMETAnide Infusion 2 mG/Hr (10 mL/Hr) IV Continuous <Continuous>  chlorhexidine 0.12% Liquid 15 milliLiter(s) Oral Mucosa every 12 hours  cholecalciferol 1000 Unit(s) Oral daily  dexAMETHasone  Injectable 6 milliGRAM(s) IV Push daily  escitalopram 15 milliGRAM(s) Oral with breakfast  gabapentin Solution 150 milliGRAM(s) Oral every 12 hours  heparin  Infusion.  Unit(s)/Hr (17 mL/Hr) IV Continuous <Continuous>  influenza  Vaccine (HIGH DOSE) 0.5 milliLiter(s) IntraMuscular once  lacosamide IVPB 200 milliGRAM(s) IV Intermittent every 12 hours  lamoTRIgine 25 milliGRAM(s) Oral two times a day  levETIRAcetam  IVPB 1000 milliGRAM(s) IV Intermittent <User Schedule>  lurasidone 40 milliGRAM(s) Oral at bedtime  midodrine 10 milliGRAM(s) Oral every 8 hours  naloxegol 12.5 milliGRAM(s) Oral daily  pantoprazole   Suspension 40 milliGRAM(s) Oral daily  piperacillin/tazobactam IVPB.. 3.375 Gram(s) IV Intermittent every 8 hours  polyethylene glycol 3350 17 Gram(s) Oral daily  propofol Infusion 10 MICROgram(s)/kG/Min (5.62 mL/Hr) IV Continuous <Continuous>  senna 2 Tablet(s) Oral at bedtime    MEDICATIONS  (PRN):  acetaminophen     Tablet .. 650 milliGRAM(s) Oral every 6 hours PRN Temp greater or equal to 38C (100.4F), Mild Pain (1 - 3)  aluminum hydroxide/magnesium hydroxide/simethicone Suspension 30 milliLiter(s) Oral every 4 hours PRN Dyspepsia  melatonin 3 milliGRAM(s) Oral at bedtime PRN Insomnia      ALLERGIES:  Allergies    seasonal allergies (Unknown)  penicillin (Hives)    Intolerances    latex (Rash)      LABS:                        7.4    9.17  )-----------( 98       ( 25 Feb 2025 00:59 )             24.0     02-25    146[H]  |  110[H]  |  67[H]  ----------------------------<  138[H]  3.5   |  19[L]  |  4.24[H]    Ca    9.4      25 Feb 2025 00:59  Phos  4.6     02-25  Mg     2.2     02-25    TPro  6.0  /  Alb  2.4[L]  /  TBili  0.4  /  DBili  x   /  AST  40  /  ALT  30  /  AlkPhos  120  02-25    PT/INR - ( 25 Feb 2025 00:59 )   PT: 11.7 sec;   INR: 1.03 ratio         PTT - ( 25 Feb 2025 00:59 )  PTT:78.7 sec  Urinalysis Basic - ( 25 Feb 2025 00:59 )    Color: x / Appearance: x / SG: x / pH: x  Gluc: 138 mg/dL / Ketone: x  / Bili: x / Urobili: x   Blood: x / Protein: x / Nitrite: x   Leuk Esterase: x / RBC: x / WBC x   Sq Epi: x / Non Sq Epi: x / Bacteria: x        RADIOLOGY & ADDITIONAL TESTS: Reviewed.

## 2025-02-25 NOTE — PROGRESS NOTE ADULT - SUBJECTIVE AND OBJECTIVE BOX
Date of Service: 02-25-25 @ 14:19    Patient is a 69y old  Male who presents with a chief complaint of Acute on chronic hypoxemic respiratory failure (25 Feb 2025 14:04)      Any change in ROS:   Intubated, sedated  ROS unable to be obtained     MEDICATIONS  (STANDING):  albuterol/ipratropium for Nebulization 3 milliLiter(s) Nebulizer every 6 hours  atorvastatin 20 milliGRAM(s) Oral at bedtime  buMETAnide Infusion 2 mG/Hr (10 mL/Hr) IV Continuous <Continuous>  chlorhexidine 0.12% Liquid 15 milliLiter(s) Oral Mucosa every 12 hours  cholecalciferol 1000 Unit(s) Oral daily  dexAMETHasone  Injectable 6 milliGRAM(s) IV Push daily  escitalopram 15 milliGRAM(s) Oral with breakfast  gabapentin Solution 150 milliGRAM(s) Oral every 12 hours  heparin  Infusion.  Unit(s)/Hr (17 mL/Hr) IV Continuous <Continuous>  influenza  Vaccine (HIGH DOSE) 0.5 milliLiter(s) IntraMuscular once  lacosamide IVPB 200 milliGRAM(s) IV Intermittent every 12 hours  lamoTRIgine 25 milliGRAM(s) Oral two times a day  levETIRAcetam  IVPB 1000 milliGRAM(s) IV Intermittent <User Schedule>  lurasidone 40 milliGRAM(s) Oral at bedtime  naloxegol 12.5 milliGRAM(s) Oral daily  pantoprazole   Suspension 40 milliGRAM(s) Oral daily  piperacillin/tazobactam IVPB.. 3.375 Gram(s) IV Intermittent every 8 hours  polyethylene glycol 3350 17 Gram(s) Oral daily  propofol Infusion 10 MICROgram(s)/kG/Min (5.62 mL/Hr) IV Continuous <Continuous>  senna 2 Tablet(s) Oral at bedtime    MEDICATIONS  (PRN):  acetaminophen     Tablet .. 650 milliGRAM(s) Oral every 6 hours PRN Temp greater or equal to 38C (100.4F), Mild Pain (1 - 3)  aluminum hydroxide/magnesium hydroxide/simethicone Suspension 30 milliLiter(s) Oral every 4 hours PRN Dyspepsia  melatonin 3 milliGRAM(s) Oral at bedtime PRN Insomnia    Vital Signs Last 24 Hrs  T(C): 37.6 (25 Feb 2025 12:00), Max: 37.6 (25 Feb 2025 12:00)  T(F): 99.7 (25 Feb 2025 12:00), Max: 99.7 (25 Feb 2025 12:00)  HR: 95 (25 Feb 2025 13:00) (82 - 107)  BP: 143/69 (25 Feb 2025 13:00) (82/47 - 162/74)  BP(mean): 99 (25 Feb 2025 13:00) (60 - 111)  RR: 21 (25 Feb 2025 13:00) (17 - 36)  SpO2: 95% (25 Feb 2025 13:00) (90% - 100%)    Parameters below as of 25 Feb 2025 12:03  Patient On (Oxygen Delivery Method): nasal cannula      Mode: AC/ CMV (Assist Control/ Continuous Mandatory Ventilation)  RR (machine): 16  TV (machine): 500  FiO2: 40  PEEP: 8  ITime: 0.74  MAP: 15  PIP: 35    I&O's Summary    24 Feb 2025 07:01  -  25 Feb 2025 07:00  --------------------------------------------------------  IN: 3613 mL / OUT: 1860 mL / NET: 1753 mL    25 Feb 2025 07:01  -  25 Feb 2025 14:19  --------------------------------------------------------  IN: 722.2 mL / OUT: 1460 mL / NET: -737.8 mL          Physical Exam:   GENERAL: NAD, well-groomed, well-developed  HEENT: BREE/   Atraumatic, Normocephalic  ENMT: No tonsillar erythema, exudates, or enlargement  NECK: Supple, No JVD, Normal thyroid  CHEST/LUNG: Coarse BS b/l   CVS: Regular rate and rhythm; No murmurs, rubs, or gallops  GI: : Soft, Nontender, Nondistended; Bowel sounds present  NERVOUS SYSTEM:  Sedated  EXTREMITIES:  2+ Peripheral Pulses, No clubbing, cyanosis, or edema  LYMPH: No lymphadenopathy noted  SKIN: No rashes or lesions  ENDOCRINOLOGY: No Thyromegaly  PSYCH: Appropriate    Labs:  ABG - ( 25 Feb 2025 00:35 )  pH, Arterial: 7.30  pH, Blood: x     /  pCO2: 44    /  pO2: 67    / HCO3: 22    / Base Excess: -4.5  /  SaO2: 94.6            40, 100, 24, 24, 23, 17, 22                            7.4    9.17  )-----------( 98       ( 25 Feb 2025 00:59 )             24.0                         7.9    8.76  )-----------( 102      ( 23 Feb 2025 22:48 )             25.9                         8.1    10.78 )-----------( 107      ( 23 Feb 2025 16:19 )             26.4                         9.2    9.74  )-----------( 105      ( 23 Feb 2025 06:14 )             29.8                         8.6    8.59  )-----------( 106      ( 22 Feb 2025 06:08 )             28.3                         8.7    7.85  )-----------( 102      ( 21 Feb 2025 19:44 )             28.1     02-25    146[H]  |  110[H]  |  67[H]  ----------------------------<  138[H]  3.5   |  19[L]  |  4.24[H]  02-24    147[H]  |  112[H]  |  64[H]  ----------------------------<  215[H]  4.1   |  20[L]  |  4.24[H]  02-24    147[H]  |  110[H]  |  63[H]  ----------------------------<  228[H]  4.4   |  20[L]  |  4.13[H]  02-23    147[H]  |  114[H]  |  60[H]  ----------------------------<  170[H]  4.2   |  19[L]  |  3.65[H]  02-23    151[H]  |  114[H]  |  54[H]  ----------------------------<  124[H]  4.1   |  21[L]  |  2.64[H]  02-22    149[H]  |  114[H]  |  54[H]  ----------------------------<  109[H]  4.1   |  21[L]  |  2.47[H]  02-21    x   |  x   |  x   ----------------------------<  x   x    |  x   |  2.54[H]    Ca    9.4      25 Feb 2025 00:59  Ca    9.3      24 Feb 2025 15:24  Ca    9.2      24 Feb 2025 11:38  Ca    9.4      23 Feb 2025 22:48  Phos  4.6     02-25  Phos  4.5     02-24  Phos  4.7     02-24  Phos  4.7     02-23  Mg     2.2     02-25  Mg     2.3     02-24  Mg     2.4     02-24  Mg     2.1     02-23    TPro  6.0  /  Alb  2.4[L]  /  TBili  0.4  /  DBili  x   /  AST  40  /  ALT  30  /  AlkPhos  120  02-25  TPro  5.8[L]  /  Alb  2.5[L]  /  TBili  0.4  /  DBili  x   /  AST  32  /  ALT  24  /  AlkPhos  119  02-24  TPro  6.1  /  Alb  2.6[L]  /  TBili  0.6  /  DBili  x   /  AST  28  /  ALT  26  /  AlkPhos  120  02-24  TPro  5.7[L]  /  Alb  2.4[L]  /  TBili  0.4  /  DBili  x   /  AST  29  /  ALT  25  /  AlkPhos  116  02-23  TPro  6.4  /  Alb  2.9[L]  /  TBili  0.4  /  DBili  x   /  AST  50[H]  /  ALT  33  /  AlkPhos  148[H]  02-22  TPro  6.4  /  Alb  2.8[L]  /  TBili  0.5  /  DBili  0.2  /  AST  52[H]  /  ALT  32  /  AlkPhos  150[H]  02-21    CAPILLARY BLOOD GLUCOSE          LIVER FUNCTIONS - ( 25 Feb 2025 00:59 )  Alb: 2.4 g/dL / Pro: 6.0 g/dL / ALK PHOS: 120 U/L / ALT: 30 U/L / AST: 40 U/L / GGT: x           PT/INR - ( 25 Feb 2025 00:59 )   PT: 11.7 sec;   INR: 1.03 ratio         PTT - ( 25 Feb 2025 00:59 )  PTT:78.7 sec  Urinalysis Basic - ( 25 Feb 2025 00:59 )    Color: x / Appearance: x / SG: x / pH: x  Gluc: 138 mg/dL / Ketone: x  / Bili: x / Urobili: x   Blood: x / Protein: x / Nitrite: x   Leuk Esterase: x / RBC: x / WBC x   Sq Epi: x / Non Sq Epi: x / Bacteria: x      D-Dimer Assay, Quantitative: 2027 ng/mL DDU (02-23 @ 06:14)        RECENT CULTURES:  02-21 @ 19:36 .Blood Blood                No growth at 72 Hours    02-21 @ 19:35 .Blood Blood                No growth at 72 Hours    02-19 @ 00:18 .Blood Blood-Peripheral                No growth at 5 days          RESPIRATORY CULTURES:          Studies  < from: Xray Chest 1 View- PORTABLE-Urgent (Xray Chest 1 View- PORTABLE-Urgent .) (02.23.25 @ 09:25) >    ACC: [id number] EXAM:  XR CHEST PORTABLE URGENT 1V   ORDERED BY: MEREDITH GOULD     PROCEDURE DATE:  02/23/2025          INTERPRETATION:  EXAMINATION: XR CHEST URGENT    CLINICAL INDICATION: re intubated, eval ETT placement.    TECHNIQUE: Single frontal, portable view of the chest was obtained.    COMPARISON: Chest x-ray 2/21/2025.    FINDINGS:  Endotracheal tube is within the mid trachea.  Enteric tube with tip below the diaphragm out of the field of view.  Right-sided port with its tip in the right atrium.  The heart is not accurately assessed in this AP projection.  Low lung volumes. Unchanged multifocal airspace opacities.  There is no pneumothorax or large pleural effusion.  No acute bony abnormality. Spinal fixation hardware in place.    IMPRESSION:  Tubes as above.  Bilateral airspace opacities unchanged    --- End of Report ---      < end of copied text >  < from: CT Chest No Cont (02.17.25 @ 16:41) >    ACC: [id number] EXAM:  CT CHEST   ORDERED BY: GERSON MORTON     PROCEDURE DATE:  02/17/2025          INTERPRETATION:  INDICATION: Pneumonia, Covid.    Axial CT images of the chest are obtained without intravenous   administration of contrast.    COMPARISON: Chest CT of January 30, 2025.    Right chest wall Mediport as on the prior study.    LYMPH NODES/MEDIASTINUM: No lymphadenopathy.    VASCULATURE/HEART: No pericardial effusion. Vascular calcifications with   involvement of the aorta and the coronary arteries. Heart size appears   enlarged.    UPPER ABDOMEN: Hypodensity within the partially imaged right kidney   suggestive of a cyst.    LUNGS/PLEURA: Limited evaluation due to significant respiratory motion   artifact.    No pleural effusions.    Interval reaeration of the right lower lobe since January 30, 2025 with   residual linear opacities suggestive of subsegmental atelectasis.    Other bilateral mid to upper lung predominant groundglass opacities with   involvement of all 5 lobes majority of which are new since January 30, 2025.    Collapse of the trachea on this expiratory CT representing   tracheomalacia. Secretions within the trachea.    CHEST WALL: Spinal Degenerative Changes. Lower spinal surgery with   partially imagedhardware. Old healed bilateral rib fractures. Old right   clavicular fracture.    IMPRESSION: Limited chest CT due to respiratory motion artifact.    Extensive bilateral groundglass opacities majority of which are new since   January 30, 2025, nonspecific finding may correspond to the given history   of Covid pneumonia.    Interval reaeration of the right lower lobe since January 30, 2025.    Tracheomalacia.    --- End of Report ---      < end of copied text >

## 2025-02-25 NOTE — PROGRESS NOTE ADULT - PROBLEM SELECTOR PLAN 3
Has a history of CKD stage 3, Cr 2.38 at baseline. Presents with Cr 3.27. Likely pre-renal.   - Renal US no hydronephrosis   - Fe Urea = 29%  - Monitor Cr daily   - Avoid nephrotoxins, dose medication to GFR  - Appreciate nephrology  - c/w bumex gtt

## 2025-02-25 NOTE — PROGRESS NOTE ADULT - ASSESSMENT
69 year old male with a past medical history of hypertension, hyperlipemia VAZQUEZ (on CPAP at bedtime, NC 2 liters during the day), CKD stage 3, epilepsy, schizophrenia, developmental delay, metastatic testicular cancer (s/p orchiectomy, actively on chemotherapy, last dose of etoposide/cisplatin on 6/2024), presenting with acute on chronic hypoxemic respiratory failure, secondary to (a) COVID-19 infection, (b) superimposed pneumonia after recent influenza infection, and/or (c) fluid overload.     MICU consulted and accepted after RRT due to increased work of breathing    NEURO:  #Mental status:  - Sedated, however, was AAOx2 prior to increased work of breathing that required intubation  - continue with propofol    #Epilepsy:  - Continue with home meds which include Lacosamide, Lurasidone, Lamotrigine - lamotrigine restarted 2/24 after being held on 2/18, per pharmacy restarted at 25 BID, uptitrate over weeks    #Brain Mets:  - MRI brain now with 5.4 mm enhancing lesion in the LEFT middle cerebellar peduncle with associated edema suspicious for metastases    CV:  #Echo:  Recent echo last admission showing Left ventricular cavity is normal in size. Left ventricular systolic function is normal with an ejection fraction of 62 %. There are no regional wall motion abnormalities seen. Normal right ventricular cavity size and normal right ventricular systolic function.   - Repeat echo continuing to show EF 70%    #Afib w/RVR  - New onset Afib RVR (on tele, confirmed with EKG 2/19)   - Plan: Started metoprolol 5mg IVP q6 hours & Hep gtt -> metoprolol held iso hypotension  - If rate remains uncontrolled, can start Cardizem drip at 5mg/h  - c/w Heparin drip    #HTN:  - On Hydralazine 25mg TID, Amlodipine 5mg, held iso current hypotension, currently on midodrine    PULM:  #Vent   - Due to increased work of breathing, now intubated    #AHRF  - Patient admitted to the hospital for AHRF, found to be COVID (+)  - Likely 2/2 PNA, does not appear to CHF decompensation  - Diuretics currently being held  - Continue with airway clearance regimen which includes duoneb,     RENAL:  #SHAGUFTA:   - Hx of CKD stage 3, Cr 2.38 at baseline, worsening and decreasing urine output  - In setting of COVID (+)  - Pre-renal, FeUrea 29%  - Monitor Cr  - Avoid nephrotoxic meds  - Wakefield in place  - Bumex 2 gtt      #Electrolyte abnormalities  Hypernatremia  - Continue to monitor  - If uptrending can consider D5W  - Free water deficit 1.4  - F/u urine studies  - Nephro recs appreciated    GI:  No acute issues     #Diet: Tube feeds via OG tube    #Bowel regimen: Movantik     #Bowel Regimen  - On Senna and Miralax    ENDO:  No acute issues  Thyroid nodule noted in prior notes    HEMATOLOGIC:  #Thrombocytopenic  - Unclear origin  - Concern for possible mets to bone marrow? vs due to infection  - Continue to monitor  - Transfuse to maintain Plt>10K unless actively bleeding then maintain >50K  - Heme-Onc recs appreciated    #DVT prophylaxis   - SCD    ID:  #COVID-19  - Concern for possible superimposed PNA   - Continue Zosyn   - S/p Remdesivir  - Blood cx from 2 days ago negative    SKIN:  #Lines:  2x PIV    Ethics:  - Full Code  - Contact: Sister Ester [phone] (unable to be reached at time of intubation) 69 year old male with a past medical history of hypertension, hyperlipemia VAZQUEZ (on CPAP at bedtime, NC 2 liters during the day), CKD stage 3, epilepsy, schizophrenia, developmental delay, metastatic testicular cancer (s/p orchiectomy, actively on chemotherapy, last dose of etoposide/cisplatin on 6/2024), presenting with acute on chronic hypoxemic respiratory failure, secondary to (a) COVID-19 infection, (b) superimposed pneumonia after recent influenza infection, and/or (c) fluid overload.     MICU consulted and accepted after RRT due to increased work of breathing    NEURO:  #Mental status:  - Sedated, however, was AAOx2 prior to increased work of breathing that required intubation  - continue with propofol    #Epilepsy:  - Continue with home meds which include Lacosamide, Lurasidone, Lamotrigine - lamotrigine restarted 2/24 after being held on 2/18, per pharmacy restarted at 25 BID, uptitrate over weeks    #Brain Mets:  - MRI brain now with 5.4 mm enhancing lesion in the LEFT middle cerebellar peduncle with associated edema suspicious for metastases    CV:  #Echo:  Recent echo last admission showing Left ventricular cavity is normal in size. Left ventricular systolic function is normal with an ejection fraction of 62 %. There are no regional wall motion abnormalities seen. Normal right ventricular cavity size and normal right ventricular systolic function.   - Repeat echo continuing to show EF 70%    #Afib w/RVR  - New onset Afib RVR (on tele, confirmed with EKG 2/19)   - Plan: Started metoprolol 5mg IVP q6 hours & Hep gtt -> metoprolol held iso hypotension  - If rate remains uncontrolled, can start Cardizem drip at 5mg/h  - c/w Heparin drip    #HTN:  - On Hydralazine 25mg TID, Amlodipine 5mg, held iso current hypotension, currently on midodrine    PULM:  #Vent   - Due to increased work of breathing, now intubated  - Failed SBT 2/25 after 4 minutes    #AHRF  - Patient admitted to the hospital for AHRF, found to be COVID (+)  - Likely 2/2 PNA, does not appear to CHF decompensation  - Diuretics currently being held  - Continue with airway clearance regimen which includes duoneb,     RENAL:  #SHAGUFTA:   - Hx of CKD stage 3, Cr 2.38 at baseline, seems to be plateauing at 4.24  - In setting of COVID (+)  - Pre-renal, FeUrea 29%  - Monitor Cr  - Avoid nephrotoxic meds  - Wakefield in place  - Bumex 2 gtt, augmented output this AM, will continue for net negative 1-2L  - Monitor BMP q12      #Electrolyte abnormalities  Hypernatremia  - Continue to monitor  - If uptrending can consider D5W  - Free water deficit 1.4  - F/u urine studies  - Nephro recs appreciated    GI:  No acute issues     #Diet: Tube feeds via OG tube    #Bowel regimen: Movantik     #Bowel Regimen  - On Senna and Miralax    ENDO:  No acute issues  Thyroid nodule noted in prior notes    HEMATOLOGIC:  #Thrombocytopenic  - Unclear origin  - Concern for possible mets to bone marrow? vs due to infection  - Continue to monitor  - Transfuse to maintain Plt>10K unless actively bleeding then maintain >50K  - Heme-Onc recs appreciated    #DVT prophylaxis   - SCD    ID:  #COVID-19  - Concern for possible superimposed PNA   - Continue Zosyn   - S/p Remdesivir  - Blood cx from 2 days ago negative    SKIN:  #Lines:  2x PIV    Ethics:  - Full Code  - Contact: Sister Ester [phone]

## 2025-02-25 NOTE — PROGRESS NOTE ADULT - ASSESSMENT
Mr. Gr is a 69 year old male with PMHx of seizure disorder, sleep apnea, HTN, chronic idiopathic constipation, stage III CKD, severe obesity, secondary hyperparathyroidism, anemia, thrombocytopenia, hyperlipidemia, testicular cancer under treatment with Dr. Ovalles who is admitted for acute hypoxic respiratory failure secondary to COVID vs superimposed pneumonia with new onset thrombocytopenia. He was recently discharged 02/06/2025 after a tenous stay including intubation in the ICU for respiratory failure in setting of seizure. He had recovered and was discharged to rehab.      Former smoker, quit 14y prior, denied ETOH, drug use. Lives at home. Does not have children. Denies family history of blood disorders or malignancy.  Denies previous workplace related exposures.  Denies previous history of blood transfusions.  Denied history of thromboses.  Denied history of  requiring anticoagulation.     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  Margins were negative.  pT3Nx. CT C/A/P with few left para-aortic and left iliac chain lymph nodes largest measuring 8.1 cm left para-aortic node, bilateral subcentimeter noncalcified pulmonary nodules measuring up to 7 mm. AFP, LDH, hCG were all elevated. 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 Started on adjuvant therapy with Cisplatin+Etoposide, so far completed 4 cycles of treatment with cisplatin dose reduced 50% and Etoposide 50% due to thrombocytopenia.      02/19/2025: on HFNC, noted tachycardia and fever, Klebsiella in urine as well, thrombocytopenia stable/improving, no signs of active bleeding     02/20/2025: developed A.fib with RVR and was placed on heparin drip and pending cardiology eval    02/21/2025: awake, on AVAPS overnight, noted RRT for hypoxia as pt removed HFNC at some point in time, good response thereafter     02/22/2025:  continues on AVAPS this morning, noted RRT overnight for hypoxia, hypercapnia, ICU following, US LE negative for DVT, counts overall stable/improved     02/23/2025: progressive respiratory failure, noted ongoing RTT this morning with pending intubation and transfer to MICU     02/24/2025: intubated 02/23/2025, sedated, on pressor support, no signs of bleeding, counts noted, no signs of bleeding       #Acute hypoxic respiratory failure  # COVID  - CT noted with bilateral GGO  - ID following  - Pulmonary following  - Antibiotics per primary team and ID   - Intubated 02/23/2025 AM, MICU     # Thrombocytopenia   - Did occur during most recent admission and improved to normal prior to discharge   - Without signs of bleeding  - Could be multifactorial, possibly due to infection   - Continue to trend  - Transfuse to maintain Plt > 10k unless actively bleeding then maintain > 50k     # Brain lesion  - pt with hx of seizures  - MRI brain now with 5.4 mm enhancing lesion in the LEFT middle cerebellar peduncle with associated edema suspicious for metastases  - MRI Lumbar negative for acute disease  - Small lesion without mass effect or edema, recommended to correlate per neurology if foci and locus are concordant with seizure activity  - Neurology recs noted, new lesion not likely to cause seizure  - It is rare, but nonetheless not impossible, for testicular cancer to be metastatic to the brain  - He will need another PET-CT, can be completed outpatient once stable if concern can proceed with biopsy at that time   - Previous endocrinology eval for thyroid nodule noted  - AFP/[facility] normal,  previously      # 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 Ovalles, agree with current plan  - Follow up as outpatient with Franki Ovalles MD     # Acute kidney injury on CKD Stage IIIA  - Likely multifactorial  - Nephrology consult and recs noted  - Continue to trend     # Normocytic anemia  - Chronic, has hx of PRBC during chemo 07/2024  - Noted Anti E, Anti-K Anti-C antibodies previously  - Monitor for bleeding  - Recommend to transfuse to maintain Hb > 7    # Klebsiella UTI  -Antibiotics per ID and primary team       Recommendations  - Trend with CBC daily   - Transfuse to maintain Hb > 7   - Transfuse to maintain Plt >10k unless active bleeding then >50k   - Monitor renal function  - Antibiotics per primary team/MICU and ID   - Cardiology follow up to address anticoagulation, currently on heparin drip   - f/u ICU recs         Thank you for allowing me to participate in the care of Mr. Gr please do not hesitate to call or text me if you have further questions or concerns.     Brayan Mart MD  [facility] [place]   Division of Hematology/Oncology  [address] [place] [address], [address]  [place] [address]  P: [phone]  F: [phone]    Attestation:    ----Pt evaluated including face-to-face interaction in addition to chart review, reviewing treatment plan, and managing the patient’s chronic diagnoses as listed in the assessment----        Mr. Gr is a 69 year old male with PMHx of seizure disorder, sleep apnea, HTN, chronic idiopathic constipation, stage III CKD, severe obesity, secondary hyperparathyroidism, anemia, thrombocytopenia, hyperlipidemia, testicular cancer under treatment with Dr. Ovalles who is admitted for acute hypoxic respiratory failure secondary to COVID vs superimposed pneumonia with new onset thrombocytopenia. He was recently discharged 02/06/2025 after a tenous stay including intubation in the ICU for respiratory failure in setting of seizure. He had recovered and was discharged to rehab.      Former smoker, quit 14y prior, denied ETOH, drug use. Lives at home. Does not have children. Denies family history of blood disorders or malignancy.  Denies previous workplace related exposures.  Denies previous history of blood transfusions.  Denied history of thromboses.  Denied history of  requiring anticoagulation.     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  Margins were negative.  pT3Nx. CT C/A/P with few left para-aortic and left iliac chain lymph nodes largest measuring 8.1 cm left para-aortic node, bilateral subcentimeter noncalcified pulmonary nodules measuring up to 7 mm. AFP, LDH, hCG were all elevated. 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 Started on adjuvant therapy with Cisplatin+Etoposide, so far completed 4 cycles of treatment with cisplatin dose reduced 50% and Etoposide 50% due to thrombocytopenia.      02/19/2025: on HFNC, noted tachycardia and fever, Klebsiella in urine as well, thrombocytopenia stable/improving, no signs of active bleeding     02/20/2025: developed A.fib with RVR and was placed on heparin drip and pending cardiology eval    02/21/2025: awake, on AVAPS overnight, noted RRT for hypoxia as pt removed HFNC at some point in time, good response thereafter     02/22/2025:  continues on AVAPS this morning, noted RRT overnight for hypoxia, hypercapnia, ICU following, US LE negative for DVT, counts overall stable/improved     02/23/2025: progressive respiratory failure, noted ongoing RTT this morning with pending intubation and transfer to MICU     02/24/2025: intubated 02/23/2025, sedated, on pressor support, no signs of bleeding, counts noted, no signs of bleeding     02/25/2025: continues in MICU, intubated and sedated, no signs of bleeding      #Acute hypoxic respiratory failure  # COVID  - CT noted with bilateral GGO  - ID following  - Pulmonary following  - Antibiotics per primary team/MICU and ID   - Intubated 02/23/2025 AM, MICU     # Thrombocytopenia   - Did occur during most recent admission and improved to normal prior to discharge   - Without signs of bleeding  - Could be multifactorial, possibly due to infection   - Continue to trend  - Transfuse to maintain Plt > 10k unless actively bleeding then maintain > 50k     # Brain lesion  - pt with hx of seizures  - MRI brain now with 5.4 mm enhancing lesion in the LEFT middle cerebellar peduncle with associated edema suspicious for metastases  - MRI Lumbar negative for acute disease  - Small lesion without mass effect or edema, recommended to correlate per neurology if foci and locus are concordant with seizure activity  - Neurology recs noted, new lesion not likely to cause seizure  - It is rare, but nonetheless not impossible, for testicular cancer to be metastatic to the brain  - He will need another PET-CT, can be completed outpatient once stable if concern can proceed with biopsy at that time   - Previous endocrinology eval for thyroid nodule noted  - AFP/[facility] normal,  previously      # 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 Ovalles, agree with current plan  - Follow up as outpatient with Franki Ovalles MD     # Acute kidney injury on CKD Stage IIIA  - Likely multifactorial  - Nephrology consult and recs noted  - Continue to trend     # Normocytic anemia  - Chronic, has hx of PRBC during chemo 07/2024  - Noted Anti E, Anti-K Anti-C antibodies previously  - Monitor for bleeding  - Recommend to transfuse to maintain Hb > 7    # Klebsiella UTI  -Antibiotics per ID and primary team       Recommendations  - Trend with CBC daily   - Transfuse to maintain Hb > 7   - Transfuse to maintain Plt >10k unless active bleeding then >50k   - Monitor renal function  - Antibiotics per primary team/MICU and ID   - Cardiology follow up to address anticoagulation, currently on heparin drip   - f/u ongoing ICU recs         Thank you for allowing me to participate in the care of Mr. Gr please do not hesitate to call or text me if you have further questions or concerns.     Brayan Mart MD  [facility] [place]   Division of Hematology/Oncology  [facility], [address]  [place] [address]  P: [phone]  F: [phone]    Attestation:    ----Pt evaluated including face-to-face interaction in addition to chart review, reviewing treatment plan, and managing the patient’s chronic diagnoses as listed in the assessment----

## 2025-02-25 NOTE — PROGRESS NOTE ADULT - SUBJECTIVE AND OBJECTIVE BOX
[facility]  JACINTO Horton Y. Patel, S. Shah, G. Casimir  [phone]  ([phone] - weekdays after 5pm and weekends)    Name: OSCAR MILAN  Age/Gender: 69y Male  MRN: [id number]    Interval History:  Patient seen and examined this morning.   Remains intubated, sedated.  Notes reviewed  Allergies: seasonal allergies (Unknown)  penicillin (Hives)      Objective:  Vitals:   T(F): 99.3 (02-25-25 @ 08:00), Max: 99.3 (02-24-25 @ 16:00)  HR: 97 (02-25-25 @ 10:00) (82 - 107)  BP: 147/72 (02-25-25 @ 10:00) (82/47 - 162/74)  RR: 20 (02-25-25 @ 10:00) (17 - 36)  SpO2: 93% (02-25-25 @ 10:00) (90% - 100%)  Physical Examination:  General: no acute distress  HEENT: normocephalic, atraumatic, ETT  Respiratory: clear to auscultation anteriorly   Cardiovascular: S1 and S2 present, normal rate   Gastrointestinal: obese, soft, nondistended  Extremities: no LE edema, no cyanosis  Skin: no visible rash    Laboratory Studies:  CBC:                       7.4    9.17  )-----------( 98       ( 25 Feb 2025 00:59 )             24.0     WBC Trend:  9.17 02-25-25 @ 00:59  8.76 02-23-25 @ 22:48  10.78 02-23-25 @ 16:19  9.74 02-23-25 @ 06:14  8.59 02-22-25 @ 06:08  7.85 02-21-25 @ 19:44  8.68 02-21-25 @ 06:30  5.88 02-20-25 @ 06:17  5.83 02-19-25 @ 17:51  6.09 02-19-25 @ 08:44  5.67 02-19-25 @ 06:28    CMP: 02-25    146[H]  |  110[H]  |  67[H]  ----------------------------<  138[H]  3.5   |  19[L]  |  4.24[H]    Ca    9.4      25 Feb 2025 00:59  Phos  4.6     02-25  Mg     2.2     02-25    TPro  6.0  /  Alb  2.4[L]  /  TBili  0.4  /  DBili  x   /  AST  40  /  ALT  30  /  AlkPhos  120  02-25    Creatinine: 4.24 mg/dL (02-25-25 @ 00:59)  Creatinine: 4.24 mg/dL (02-24-25 @ 15:24)  Creatinine: 4.13 mg/dL (02-24-25 @ 11:38)  Creatinine: 3.65 mg/dL (02-23-25 @ 22:48)  Creatinine: 2.64 mg/dL (02-23-25 @ 06:11)  Creatinine: 2.47 mg/dL (02-22-25 @ 06:08)  Creatinine: 2.54 mg/dL (02-21-25 @ 19:36)  Creatinine: 2.44 mg/dL (02-21-25 @ 14:13)  Creatinine: 0.68 mg/dL (02-21-25 @ 01:02)  Creatinine: 2.31 mg/dL (02-20-25 @ 06:17)  Creatinine: 2.64 mg/dL (02-19-25 @ 08:57)  Creatinine: 2.09 mg/dL (02-19-25 @ 06:28)  Creatinine: 1.95 mg/dL (02-19-25 @ 06:27)    LIVER FUNCTIONS - ( 25 Feb 2025 00:59 )  Alb: 2.4 g/dL / Pro: 6.0 g/dL / ALK PHOS: 120 U/L / ALT: 30 U/L / AST: 40 U/L / GGT: x           Microbiology: reviewed   Culture - Blood (collected 02-21-25 @ 19:36)  Source: .Blood Blood  Preliminary Report (02-25-25 @ 02:02):    No growth at 72 Hours    Culture - Blood (collected 02-21-25 @ 19:35)  Source: .Blood Blood  Preliminary Report (02-25-25 @ 02:02):    No growth at 72 Hours    Culture - Blood (collected 02-19-25 @ 00:18)  Source: .Blood Blood-Peripheral  Final Report (02-24-25 @ 03:01):    No growth at 5 days    Culture - Blood (collected 02-19-25 @ 00:18)  Source: .Blood Blood-Peripheral  Final Report (02-24-25 @ 03:01):    No growth at 5 days    Culture - Sputum (collected 02-17-25 @ 22:23)  Source: .Sputum Sputum  Gram Stain (02-18-25 @ 06:21):    Few polymorphonuclear leukocytes per low power field    No Squamous epithelial cells per low power field    Few Gram Variable Rods seen per oil power field    Rare Gram positive cocci in pairs seen per oil power field  Final Report (02-19-25 @ 16:20):    Commensal lucia consistent with body site    Culture - Urine (collected 02-16-25 @ 11:47)  Source: Clean Catch Clean Catch (Midstream)  Final Report (02-18-25 @ 11:44):    10,000 - 49,000 CFU/mL Klebsiella pneumoniae  Organism: Klebsiella pneumoniae (02-18-25 @ 11:44)  Organism: Klebsiella pneumoniae (02-18-25 @ 11:44)      Method Type: MARIELENA      -  Amoxicillin/Clavulanic Acid: S <=8/4      -  Ampicillin: R >16 These ampicillin results predict results for amoxicillin      -  Ampicillin/Sulbactam: S <=4/2      -  Aztreonam: S <=4      -  Cefazolin: S <=2 For uncomplicated UTI with K. pneumoniae, E. coli, or P. mirablis: MARIELENA <=16 is sensitive and MARIELENA >=32 is resistant. This also predicts results for oral agents cefaclor, cefdinir, cefpodoxime, cefprozil, cefuroxime axetil, cephalexin and locarbef for uncomplicated UTI. Note that some isolates may be susceptible to these agents while testing resistant to cefazolin.      -  Cefepime: S <=2      -  Cefoxitin: S <=8      -  Ceftriaxone: S <=1      -  Cefuroxime: S <=4      -  Ciprofloxacin: S <=0.25      -  Ertapenem: S <=0.5      -  Gentamicin: S <=2      -  Imipenem: S <=1      -  Levofloxacin: S <=0.5      -  Meropenem: S <=1      -  Nitrofurantoin: I 64 Should not be used to treat pyelonephritis      -  Piperacillin/Tazobactam: S <=8      -  Tobramycin: S <=2      -  Trimethoprim/Sulfamethoxazole: S <=0.5/9.5    Culture - Blood (collected 02-16-25 @ 09:35)  Source: .Blood BLOOD  Final Report (02-21-25 @ 15:01):    No growth at 5 days    Culture - Blood (collected 02-16-25 @ 09:25)  Source: .Blood BLOOD  Final Report (02-21-25 @ 15:01):    No growth at 5 days    Radiology: reviewed     Medications:  acetaminophen     Tablet .. 650 milliGRAM(s) Oral every 6 hours PRN  albuterol/ipratropium for Nebulization 3 milliLiter(s) Nebulizer every 6 hours  aluminum hydroxide/magnesium hydroxide/simethicone Suspension 30 milliLiter(s) Oral every 4 hours PRN  atorvastatin 20 milliGRAM(s) Oral at bedtime  buMETAnide Infusion 2 mG/Hr IV Continuous <Continuous>  chlorhexidine 0.12% Liquid 15 milliLiter(s) Oral Mucosa every 12 hours  cholecalciferol 1000 Unit(s) Oral daily  dexAMETHasone  Injectable 6 milliGRAM(s) IV Push daily  escitalopram 15 milliGRAM(s) Oral with breakfast  gabapentin Solution 150 milliGRAM(s) Oral every 12 hours  heparin  Infusion.  Unit(s)/Hr IV Continuous <Continuous>  influenza  Vaccine (HIGH DOSE) 0.5 milliLiter(s) IntraMuscular once  lacosamide IVPB 200 milliGRAM(s) IV Intermittent every 12 hours  lamoTRIgine 25 milliGRAM(s) Oral two times a day  levETIRAcetam  IVPB 1000 milliGRAM(s) IV Intermittent <User Schedule>  lurasidone 40 milliGRAM(s) Oral at bedtime  melatonin 3 milliGRAM(s) Oral at bedtime PRN  naloxegol 12.5 milliGRAM(s) Oral daily  pantoprazole   Suspension 40 milliGRAM(s) Oral daily  piperacillin/tazobactam IVPB.. 3.375 Gram(s) IV Intermittent every 8 hours  polyethylene glycol 3350 17 Gram(s) Oral daily  propofol Infusion 10 MICROgram(s)/kG/Min IV Continuous <Continuous>  senna 2 Tablet(s) Oral at bedtime    Current Antimicrobials:  piperacillin/tazobactam IVPB.. 3.375 Gram(s) IV Intermittent every 8 hours    Prior/Completed Antimicrobials:  piperacillin/tazobactam IVPB...  remdesivir  IVPB  remdesivir  IVPB  remdesivir  IVPB  vancomycin  IVPB.

## 2025-02-25 NOTE — PROGRESS NOTE ADULT - SUBJECTIVE AND OBJECTIVE BOX
SUBJECTIVE/ OVERNIGHT EVENTS:  intubated, sedated  on Bumex drip   off pressors  off midodrine.        --------------------------------------------------------------------------------------------  LABS:                        7.4    9.17  )-----------( 98       ( 25 Feb 2025 00:59 )             24.0     02-25    146[H]  |  110[H]  |  67[H]  ----------------------------<  138[H]  3.5   |  19[L]  |  4.24[H]    Ca    9.4      25 Feb 2025 00:59  Phos  4.6     02-25  Mg     2.2     02-25    TPro  6.0  /  Alb  2.4[L]  /  TBili  0.4  /  DBili  x   /  AST  40  /  ALT  30  /  AlkPhos  120  02-25    PT/INR - ( 25 Feb 2025 00:59 )   PT: 11.7 sec;   INR: 1.03 ratio         PTT - ( 25 Feb 2025 00:59 )  PTT:78.7 sec  CAPILLARY BLOOD GLUCOSE            Urinalysis Basic - ( 25 Feb 2025 00:59 )    Color: x / Appearance: x / SG: x / pH: x  Gluc: 138 mg/dL / Ketone: x  / Bili: x / Urobili: x   Blood: x / Protein: x / Nitrite: x   Leuk Esterase: x / RBC: x / WBC x   Sq Epi: x / Non Sq Epi: x / Bacteria: x        RADIOLOGY & ADDITIONAL TESTS:    Imaging Personally Reviewed:  [x] YES  [ ] NO    Consultant(s) Notes Reviewed:  [x] YES  [ ] NO    MEDICATIONS  (STANDING):  albuterol/ipratropium for Nebulization 3 milliLiter(s) Nebulizer every 6 hours  atorvastatin 20 milliGRAM(s) Oral at bedtime  buMETAnide Infusion 2 mG/Hr (10 mL/Hr) IV Continuous <Continuous>  chlorhexidine 0.12% Liquid 15 milliLiter(s) Oral Mucosa every 12 hours  cholecalciferol 1000 Unit(s) Oral daily  dexAMETHasone  Injectable 6 milliGRAM(s) IV Push daily  escitalopram 15 milliGRAM(s) Oral with breakfast  gabapentin Solution 150 milliGRAM(s) Oral every 12 hours  heparin  Infusion.  Unit(s)/Hr (17 mL/Hr) IV Continuous <Continuous>  influenza  Vaccine (HIGH DOSE) 0.5 milliLiter(s) IntraMuscular once  lacosamide IVPB 200 milliGRAM(s) IV Intermittent every 12 hours  lamoTRIgine 25 milliGRAM(s) Oral two times a day  levETIRAcetam  IVPB 1000 milliGRAM(s) IV Intermittent <User Schedule>  lurasidone 40 milliGRAM(s) Oral at bedtime  naloxegol 12.5 milliGRAM(s) Oral daily  pantoprazole   Suspension 40 milliGRAM(s) Oral daily  piperacillin/tazobactam IVPB.. 3.375 Gram(s) IV Intermittent every 8 hours  polyethylene glycol 3350 17 Gram(s) Oral daily  propofol Infusion 10 MICROgram(s)/kG/Min (5.62 mL/Hr) IV Continuous <Continuous>  senna 2 Tablet(s) Oral at bedtime    MEDICATIONS  (PRN):  acetaminophen     Tablet .. 650 milliGRAM(s) Oral every 6 hours PRN Temp greater or equal to 38C (100.4F), Mild Pain (1 - 3)  aluminum hydroxide/magnesium hydroxide/simethicone Suspension 30 milliLiter(s) Oral every 4 hours PRN Dyspepsia  melatonin 3 milliGRAM(s) Oral at bedtime PRN Insomnia      Care Discussed with Consultants/Other Providers [x] YES  [ ] NO    Vital Signs Last 24 Hrs  T(C): 37.6 (25 Feb 2025 12:00), Max: 37.6 (25 Feb 2025 12:00)  T(F): 99.7 (25 Feb 2025 12:00), Max: 99.7 (25 Feb 2025 12:00)  HR: 88 (25 Feb 2025 12:03) (82 - 107)  BP: 132/65 (25 Feb 2025 12:00) (82/47 - 162/74)  BP(mean): 94 (25 Feb 2025 12:00) (60 - 111)  RR: 20 (25 Feb 2025 12:00) (17 - 36)  SpO2: 98% (25 Feb 2025 12:03) (90% - 100%)    Parameters below as of 25 Feb 2025 12:03  Patient On (Oxygen Delivery Method): nasal cannula      I&O's Summary    24 Feb 2025 07:01  -  25 Feb 2025 07:00  --------------------------------------------------------  IN: 3613 mL / OUT: 1860 mL / NET: 1753 mL    25 Feb 2025 07:01  -  25 Feb 2025 12:17  --------------------------------------------------------  IN: 551 mL / OUT: 1075 mL / NET: -524 mL        PHYSICAL EXAM:  GENERAL: intubated, sedated  HEAD:  Atraumatic, Normocephalic  EYES: EOMI, PERRLA, conjunctiva and sclera clear  NECK: Supple, No JVD  CHEST/LUNG: mild decrease breath sounds bilaterally; No wheeze   HEART: Regular rate and rhythm; No murmurs, rubs, or gallops  ABDOMEN: Soft, Nontender, Nondistended; Bowel sounds present  Neuro:  intubated, sedated  EXTREMITIES:  2+ Peripheral Pulses, No clubbing, cyanosis, or edema  SKIN: No rashes or lesions

## 2025-02-25 NOTE — PROGRESS NOTE ADULT - SUBJECTIVE AND OBJECTIVE BOX
[facility]   [phone]  RENAL FOLLOW UP NOTE  --------------------------------------------------------------------------------  Chief Complaint:    24 hour events/subjective:    seen earlier  intubated     PAST HISTORY  --------------------------------------------------------------------------------  No significant changes to PMH, PSH, FHx, SHx, unless otherwise noted    ALLERGIES & MEDICATIONS  --------------------------------------------------------------------------------  Allergies    seasonal allergies (Unknown)  penicillin (Hives)    Intolerances    latex (Rash)    Standing Inpatient Medications  albuterol/ipratropium for Nebulization 3 milliLiter(s) Nebulizer every 6 hours  atorvastatin 20 milliGRAM(s) Oral at bedtime  buMETAnide Infusion 2 mG/Hr IV Continuous <Continuous>  chlorhexidine 0.12% Liquid 15 milliLiter(s) Oral Mucosa every 12 hours  cholecalciferol 1000 Unit(s) Oral daily  dexAMETHasone  Injectable 6 milliGRAM(s) IV Push daily  escitalopram 15 milliGRAM(s) Oral with breakfast  gabapentin Solution 150 milliGRAM(s) Oral every 12 hours  heparin  Infusion.  Unit(s)/Hr IV Continuous <Continuous>  influenza  Vaccine (HIGH DOSE) 0.5 milliLiter(s) IntraMuscular once  lacosamide IVPB 200 milliGRAM(s) IV Intermittent every 12 hours  lamoTRIgine 25 milliGRAM(s) Oral two times a day  levETIRAcetam  IVPB 1000 milliGRAM(s) IV Intermittent <User Schedule>  lurasidone 40 milliGRAM(s) Oral at bedtime  naloxegol 12.5 milliGRAM(s) Oral daily  pantoprazole   Suspension 40 milliGRAM(s) Oral daily  piperacillin/tazobactam IVPB.. 3.375 Gram(s) IV Intermittent every 8 hours  polyethylene glycol 3350 17 Gram(s) Oral daily  propofol Infusion 10 MICROgram(s)/kG/Min IV Continuous <Continuous>  senna 2 Tablet(s) Oral at bedtime    PRN Inpatient Medications      REVIEW OF SYSTEMS  --------------------------------------------------------------------------------      VITALS/PHYSICAL EXAM  --------------------------------------------------------------------------------  T(C): 37.7 (02-25-25 @ 19:00), Max: 37.7 (02-25-25 @ 16:00)  HR: 94 (02-25-25 @ 21:12) (88 - 107)  BP: 138/65 (02-25-25 @ 20:00) (97/49 - 162/74)  RR: 22 (02-25-25 @ 20:00) (17 - 36)  SpO2: 94% (02-25-25 @ 21:12) (90% - 100%)  Wt(kg): --        02-24-25 @ 07:01  -  02-25-25 @ 07:00  --------------------------------------------------------  IN: 3613 mL / OUT: 1860 mL / NET: 1753 mL    02-25-25 @ 07:01  -  02-25-25 @ 21:33  --------------------------------------------------------  IN: 1946.8 mL / OUT: 3210 mL / NET: -1263.2 mL      Physical Exam:  	    	  Gen: ill appearing male, intubated   	Pulm: decrease bs, +coarse   	CV: No JVD. RRR, S1S2; no rub  	Abd: +BS, soft, nondistended  	: No suprapubic tenderness, external catheter  	UE: Warm, no cyanosis  no clubbing,  no edema;  	LE: Warm, no cyanosis  no clubbing, no edema    LABS/STUDIES  --------------------------------------------------------------------------------              7.4    9.17  >-----------<  98       [02-25-25 @ 00:59]              24.0     143  |  104  |  70  ----------------------------<  177      [02-25-25 @ 15:50]  4.4   |  22  |  4.14        Ca     9.7     [02-25-25 @ 15:50]      Mg     2.2     [02-25-25 @ 15:50]      Phos  4.6     [02-25-25 @ 15:50]    TPro  6.7  /  Alb  2.7  /  TBili  0.4  /  DBili  x   /  AST  41  /  ALT  39  /  AlkPhos  129  [02-25-25 @ 15:50]    PT/INR: PT 11.7 , INR 1.03       [02-25-25 @ 00:59]  PTT: 78.7       [02-25-25 @ 00:59]      Creatinine Trend:  SCr 4.14 [02-25 @ 15:50]  SCr 4.24 [02-25 @ 00:59]  SCr 4.24 [02-24 @ 15:24]  SCr 4.13 [02-24 @ 11:38]  SCr 3.65 [02-23 @ 22:48]          Ferritin [id number]      [02-23-25 @ 06:15]  TSH 0.87      [01-25-25 @ 11:31]

## 2025-02-25 NOTE — PROGRESS NOTE ADULT - ASSESSMENT
Patient is a 69 year old male with PMH of HTN, HLD, VAZQUEZ (on CPAP at bedtime, NC 2 liters during the day), CKD3, epilepsy, schizophrenia, developmental delay, metastatic testicular cancer (s/p orchiectomy, actively on chemotherapy, last dose of etoposide/cisplatin on 6/2024) presenting with worsening shortness of breath. Patient was recently hospitalized at [facility] from January 27th 2025 to February 6th 2025 for seizure and influenza virus infection, which required intubation. He was sent to rehab (originally from home) from hospital and now returns due to sudden onset shortness of breath and worsening oxygenation. Of note, he tested positive for COVID-19 at facility on 2/13/25 and was not put on any COVID-19 treatment at the time, only guaifenesin and scopolamine patch. Patient was placed on non-rebreather and sent to the hospital on 2/16/25.     Acute on chronic hypoxic respiratory failure  COVID-19 pneumonia, superimposed bacterial pneumonia  Now with severe sepsis, hypotension, SHAGUFTA, likely due to aspiration pneumonia   Acute on chronic HFpEF  SHAGUFTA on CKD  - noted initial discussion with patient/family and decided to hold off on remdesivir given LFTs and SHAGUFTA  - CXR with likely congestive changes, possible superimposed focal infiltrate in RUL, no effusion  - CT chest with extensive b/l ggo majority new since 1/30/25-c/w COVID pneumonia; tracheomalacia   - PCT likely not helpful given patient with renal dysfunction   - Legionella and Strep pneumoniae urine ags negative   - MRSA PCR screen negative, s/p vancomycin   - Ucx noted with Klebsiella, pt with no gu symptoms but covered by zosyn   - 2/16 Bcx negative   - 2/17 Scx with normal resp lucia   - 2/18 had worsening resp status on NC requiring AVAPS, started on remdesivir   - 2/19 overnight febrile to 100.8F, currently on HFNC, comfortable, WBC remains wnl, UA negative   - exam with no new focal findings, suspect fevers d/t COVID pneumonia   - 2/21 afebrile >24h, remains on HFNC/AVAPS, WBC wnl  - 2/22 completed remdesivir x5d course   - 2/23 s/p RRT for inc WOB, intubated and transferred to MICU  - 2/24 remains intubated, on sedation, pressor support, SHAGUFTA, WBC wnl   - 2/25 afebrile, off pressor, WBC wnl, Cr stable    Recommendations:  Continue zosyn 3.375g IV Q8h (renally dosed) until 2/28  Pulmonary following, on dexamethasone   Nephrology following, monitor Cr   Monitor temps/WBC - if any fevers or worsening, send Bcx, Scx, obtain CXR and broaden to meropenem  Supportive care  Aspiration precautions  Continue rest of care per primary team       Greyson Mart M.D.  [facility]  Available on [employer] TEAMS - *PREFERRED*  [phone]  After 5pm on weekdays and all day on weekends - please call [phone]     Thank you for consulting us and involving us in the management of this patients case. In addition to reviewing history, imaging, documents, labs, microbiology, took into account antibiotic stewardship, local antibiogram and infection control strategies and potential transmission issues.

## 2025-02-26 LAB
ANION GAP SERPL CALC-SCNC: 17 MMOL/L — SIGNIFICANT CHANGE UP (ref 5–17)
ANION GAP SERPL CALC-SCNC: 18 MMOL/L — HIGH (ref 5–17)
ANION GAP SERPL CALC-SCNC: 21 MMOL/L — HIGH (ref 5–17)
APTT BLD: 66.7 SEC — HIGH (ref 24.5–35.6)
BASOPHILS # BLD AUTO: 0 K/UL — SIGNIFICANT CHANGE UP (ref 0–0.2)
BASOPHILS NFR BLD AUTO: 0 % — SIGNIFICANT CHANGE UP (ref 0–2)
BUN SERPL-MCNC: 75 MG/DL — HIGH (ref 7–23)
BUN SERPL-MCNC: 77 MG/DL — HIGH (ref 7–23)
BUN SERPL-MCNC: 81 MG/DL — HIGH (ref 7–23)
CALCIUM SERPL-MCNC: 9.5 MG/DL — SIGNIFICANT CHANGE UP (ref 8.4–10.5)
CALCIUM SERPL-MCNC: 9.6 MG/DL — SIGNIFICANT CHANGE UP (ref 8.4–10.5)
CALCIUM SERPL-MCNC: 9.7 MG/DL — SIGNIFICANT CHANGE UP (ref 8.4–10.5)
CHLORIDE SERPL-SCNC: 102 MMOL/L — SIGNIFICANT CHANGE UP (ref 96–108)
CHLORIDE SERPL-SCNC: 98 MMOL/L — SIGNIFICANT CHANGE UP (ref 96–108)
CHLORIDE SERPL-SCNC: 99 MMOL/L — SIGNIFICANT CHANGE UP (ref 96–108)
CO2 SERPL-SCNC: 21 MMOL/L — LOW (ref 22–31)
CO2 SERPL-SCNC: 22 MMOL/L — SIGNIFICANT CHANGE UP (ref 22–31)
CO2 SERPL-SCNC: 25 MMOL/L — SIGNIFICANT CHANGE UP (ref 22–31)
CREAT SERPL-MCNC: 3.95 MG/DL — HIGH (ref 0.5–1.3)
CREAT SERPL-MCNC: 4.01 MG/DL — HIGH (ref 0.5–1.3)
CREAT SERPL-MCNC: 4.03 MG/DL — HIGH (ref 0.5–1.3)
EGFR: 15 ML/MIN/1.73M2 — LOW
EGFR: 15 ML/MIN/1.73M2 — LOW
EGFR: 16 ML/MIN/1.73M2 — LOW
EOSINOPHIL # BLD AUTO: 0.07 K/UL — SIGNIFICANT CHANGE UP (ref 0–0.5)
EOSINOPHIL NFR BLD AUTO: 0.9 % — SIGNIFICANT CHANGE UP (ref 0–6)
GAS PNL BLDA: SIGNIFICANT CHANGE UP
GAS PNL BLDA: SIGNIFICANT CHANGE UP
GLUCOSE SERPL-MCNC: 142 MG/DL — HIGH (ref 70–99)
GLUCOSE SERPL-MCNC: 161 MG/DL — HIGH (ref 70–99)
GLUCOSE SERPL-MCNC: 187 MG/DL — HIGH (ref 70–99)
HCT VFR BLD CALC: 23.2 % — LOW (ref 39–50)
HGB BLD-MCNC: 7.3 G/DL — LOW (ref 13–17)
INR BLD: 1.04 RATIO — SIGNIFICANT CHANGE UP (ref 0.85–1.16)
LYMPHOCYTES # BLD AUTO: 0.88 K/UL — LOW (ref 1–3.3)
LYMPHOCYTES # BLD AUTO: 11.6 % — LOW (ref 13–44)
MAGNESIUM SERPL-MCNC: 2.1 MG/DL — SIGNIFICANT CHANGE UP (ref 1.6–2.6)
MANUAL SMEAR VERIFICATION: SIGNIFICANT CHANGE UP
MCHC RBC-ENTMCNC: 31.2 PG — SIGNIFICANT CHANGE UP (ref 27–34)
MCHC RBC-ENTMCNC: 31.5 G/DL — LOW (ref 32–36)
MCV RBC AUTO: 99.1 FL — SIGNIFICANT CHANGE UP (ref 80–100)
MONOCYTES # BLD AUTO: 0.34 K/UL — SIGNIFICANT CHANGE UP (ref 0–0.9)
MONOCYTES NFR BLD AUTO: 4.5 % — SIGNIFICANT CHANGE UP (ref 2–14)
NEUTROPHILS # BLD AUTO: 6.27 K/UL — SIGNIFICANT CHANGE UP (ref 1.8–7.4)
NEUTROPHILS NFR BLD AUTO: 83 % — HIGH (ref 43–77)
PHOSPHATE SERPL-MCNC: 4.8 MG/DL — HIGH (ref 2.5–4.5)
PHOSPHATE SERPL-MCNC: 4.8 MG/DL — HIGH (ref 2.5–4.5)
PHOSPHATE SERPL-MCNC: 5.4 MG/DL — HIGH (ref 2.5–4.5)
PLAT MORPH BLD: NORMAL — SIGNIFICANT CHANGE UP
PLATELET # BLD AUTO: 103 K/UL — LOW (ref 150–400)
POTASSIUM SERPL-MCNC: 3.8 MMOL/L — SIGNIFICANT CHANGE UP (ref 3.5–5.3)
POTASSIUM SERPL-MCNC: 3.8 MMOL/L — SIGNIFICANT CHANGE UP (ref 3.5–5.3)
POTASSIUM SERPL-MCNC: 4.2 MMOL/L — SIGNIFICANT CHANGE UP (ref 3.5–5.3)
POTASSIUM SERPL-SCNC: 3.8 MMOL/L — SIGNIFICANT CHANGE UP (ref 3.5–5.3)
POTASSIUM SERPL-SCNC: 3.8 MMOL/L — SIGNIFICANT CHANGE UP (ref 3.5–5.3)
POTASSIUM SERPL-SCNC: 4.2 MMOL/L — SIGNIFICANT CHANGE UP (ref 3.5–5.3)
PROTHROM AB SERPL-ACNC: 12 SEC — SIGNIFICANT CHANGE UP (ref 9.9–13.4)
RBC # BLD: 2.34 M/UL — LOW (ref 4.2–5.8)
RBC # FLD: 15 % — HIGH (ref 10.3–14.5)
RBC BLD AUTO: SIGNIFICANT CHANGE UP
SODIUM SERPL-SCNC: 140 MMOL/L — SIGNIFICANT CHANGE UP (ref 135–145)
SODIUM SERPL-SCNC: 141 MMOL/L — SIGNIFICANT CHANGE UP (ref 135–145)
SODIUM SERPL-SCNC: 142 MMOL/L — SIGNIFICANT CHANGE UP (ref 135–145)
WBC # BLD: 7.55 K/UL — SIGNIFICANT CHANGE UP (ref 3.8–10.5)
WBC # FLD AUTO: 7.55 K/UL — SIGNIFICANT CHANGE UP (ref 3.8–10.5)

## 2025-02-26 PROCEDURE — 99291 CRITICAL CARE FIRST HOUR: CPT

## 2025-02-26 PROCEDURE — 71045 X-RAY EXAM CHEST 1 VIEW: CPT | Mod: 26

## 2025-02-26 PROCEDURE — 71250 CT THORAX DX C-: CPT | Mod: 26

## 2025-02-26 RX ORDER — BUMETANIDE 1 MG/1
2 TABLET ORAL DAILY
Refills: 0 | Status: DISCONTINUED | OUTPATIENT
Start: 2025-02-26 | End: 2025-02-26

## 2025-02-26 RX ORDER — BUMETANIDE 1 MG/1
1 TABLET ORAL
Qty: 20 | Refills: 0 | Status: DISCONTINUED | OUTPATIENT
Start: 2025-02-26 | End: 2025-02-26

## 2025-02-26 RX ORDER — PIPERACILLIN-TAZO-DEXTROSE,ISO 3.375G/5
3.38 IV SOLUTION, PIGGYBACK PREMIX FROZEN(ML) INTRAVENOUS EVERY 8 HOURS
Refills: 0 | Status: DISCONTINUED | OUTPATIENT
Start: 2025-02-26 | End: 2025-02-28

## 2025-02-26 RX ORDER — FENTANYL CITRATE-0.9 % NACL/PF 100MCG/2ML
50 SYRINGE (ML) INTRAVENOUS ONCE
Refills: 0 | Status: DISCONTINUED | OUTPATIENT
Start: 2025-02-26 | End: 2025-02-26

## 2025-02-26 RX ORDER — BUMETANIDE 1 MG/1
2 TABLET ORAL
Refills: 0 | Status: DISCONTINUED | OUTPATIENT
Start: 2025-02-26 | End: 2025-02-26

## 2025-02-26 RX ORDER — BUMETANIDE 1 MG/1
2 TABLET ORAL
Qty: 20 | Refills: 0 | Status: DISCONTINUED | OUTPATIENT
Start: 2025-02-26 | End: 2025-02-27

## 2025-02-26 RX ADMIN — BUMETANIDE 10 MG/HR: 1 TABLET ORAL at 19:12

## 2025-02-26 RX ADMIN — Medication 15 MILLILITER(S): at 06:11

## 2025-02-26 RX ADMIN — LEVETIRACETAM 400 MILLIGRAM(S): 10 INJECTION, SOLUTION INTRAVENOUS at 06:10

## 2025-02-26 RX ADMIN — IPRATROPIUM BROMIDE AND ALBUTEROL SULFATE 3 MILLILITER(S): .5; 2.5 SOLUTION RESPIRATORY (INHALATION) at 23:34

## 2025-02-26 RX ADMIN — Medication 25 GRAM(S): at 13:03

## 2025-02-26 RX ADMIN — LACOSAMIDE 140 MILLIGRAM(S): 150 TABLET, FILM COATED ORAL at 06:10

## 2025-02-26 RX ADMIN — PROPOFOL 5.62 MICROGRAM(S)/KG/MIN: 10 INJECTION, EMULSION INTRAVENOUS at 19:11

## 2025-02-26 RX ADMIN — Medication 40 MILLIGRAM(S): at 11:03

## 2025-02-26 RX ADMIN — HEPARIN SODIUM 1500 UNIT(S)/HR: 1000 INJECTION INTRAVENOUS; SUBCUTANEOUS at 19:15

## 2025-02-26 RX ADMIN — GABAPENTIN 150 MILLIGRAM(S): 400 CAPSULE ORAL at 17:18

## 2025-02-26 RX ADMIN — LURASIDONE HYDROCHLORIDE 40 MILLIGRAM(S): 120 TABLET, FILM COATED ORAL at 21:41

## 2025-02-26 RX ADMIN — LEVETIRACETAM 400 MILLIGRAM(S): 10 INJECTION, SOLUTION INTRAVENOUS at 13:03

## 2025-02-26 RX ADMIN — ESCITALOPRAM OXALATE 15 MILLIGRAM(S): 20 TABLET ORAL at 07:41

## 2025-02-26 RX ADMIN — Medication 15 MILLILITER(S): at 17:19

## 2025-02-26 RX ADMIN — Medication 50 MICROGRAM(S): at 23:15

## 2025-02-26 RX ADMIN — HEPARIN SODIUM 1500 UNIT(S)/HR: 1000 INJECTION INTRAVENOUS; SUBCUTANEOUS at 06:55

## 2025-02-26 RX ADMIN — LACOSAMIDE 140 MILLIGRAM(S): 150 TABLET, FILM COATED ORAL at 17:41

## 2025-02-26 RX ADMIN — GABAPENTIN 150 MILLIGRAM(S): 400 CAPSULE ORAL at 06:11

## 2025-02-26 RX ADMIN — Medication 1000 UNIT(S): at 11:03

## 2025-02-26 RX ADMIN — IPRATROPIUM BROMIDE AND ALBUTEROL SULFATE 3 MILLILITER(S): .5; 2.5 SOLUTION RESPIRATORY (INHALATION) at 11:32

## 2025-02-26 RX ADMIN — LAMOTRIGINE 25 MILLIGRAM(S): 150 TABLET ORAL at 06:11

## 2025-02-26 RX ADMIN — ATORVASTATIN CALCIUM 20 MILLIGRAM(S): 80 TABLET, FILM COATED ORAL at 21:41

## 2025-02-26 RX ADMIN — LEVETIRACETAM 400 MILLIGRAM(S): 10 INJECTION, SOLUTION INTRAVENOUS at 21:43

## 2025-02-26 RX ADMIN — DEXAMETHASONE 6 MILLIGRAM(S): 0.5 TABLET ORAL at 06:12

## 2025-02-26 RX ADMIN — LAMOTRIGINE 25 MILLIGRAM(S): 150 TABLET ORAL at 17:30

## 2025-02-26 RX ADMIN — Medication 25 GRAM(S): at 06:11

## 2025-02-26 RX ADMIN — Medication 20 MILLIEQUIVALENT(S): at 06:11

## 2025-02-26 RX ADMIN — Medication 50 MICROGRAM(S): at 23:01

## 2025-02-26 RX ADMIN — PROPOFOL 5.62 MICROGRAM(S)/KG/MIN: 10 INJECTION, EMULSION INTRAVENOUS at 14:29

## 2025-02-26 RX ADMIN — IPRATROPIUM BROMIDE AND ALBUTEROL SULFATE 3 MILLILITER(S): .5; 2.5 SOLUTION RESPIRATORY (INHALATION) at 17:40

## 2025-02-26 RX ADMIN — PROPOFOL 5.62 MICROGRAM(S)/KG/MIN: 10 INJECTION, EMULSION INTRAVENOUS at 06:56

## 2025-02-26 RX ADMIN — Medication 25 GRAM(S): at 21:41

## 2025-02-26 RX ADMIN — IPRATROPIUM BROMIDE AND ALBUTEROL SULFATE 3 MILLILITER(S): .5; 2.5 SOLUTION RESPIRATORY (INHALATION) at 05:08

## 2025-02-26 RX ADMIN — BUMETANIDE 10 MG/HR: 1 TABLET ORAL at 09:13

## 2025-02-26 NOTE — PROGRESS NOTE ADULT - PROBLEM SELECTOR PLAN 1
?superimposed PNA on top of COVID-19?  - Zosyn 4.5 g q12h, renally dosed  - Vancomycin discontinued  - Decadron 6mg IVPB daily x 10 days for COVID-19   - completed Remdesivir IV   - Maintain O2 sats above 92%  - BiPaP daytime prn   - BiPaP at bedtime   - Blood cultures x 2 (-) so far  - MRSA swab negative  - Sputum culture --> few gram variable rods and rare G (+) cocci in pairs  - Legionella Ur Ag (-)  - Streptococcus pneumoniae testing  - Appreciate pulmonology  - bumex gtt per ICU team, monitor urine output

## 2025-02-26 NOTE — PROGRESS NOTE ADULT - NSPROGADDITIONALINFOA_GEN_ALL_CORE
speech & swallow eval appreciated, s/p FEES: recommends thicken pureed diet.  events reviewed, RRT for hypoxia, intubated/sedated for increased work of breathing.   transferred to ICU. ICU eval appreciated  GOC discussed, full code per the sister.   Transferred to ICU, remain intubated.   started bumex gtt for overload. weaned off pressors.   sedation vacation and weaning trial per ICU team.  d/w ID. on IV Zosyn.   ICU care appreciated.     - Dr. SHIRA Leroy ([facility])  - [phone]

## 2025-02-26 NOTE — PROGRESS NOTE ADULT - SUBJECTIVE AND OBJECTIVE BOX
SUBJECTIVE/ OVERNIGHT EVENTS:  remain in ICU  intubated sedated.    --------------------------------------------------------------------------------------------  LABS:                        7.3    7.55  )-----------( 103      ( 26 Feb 2025 00:17 )             23.2     02-26    142  |  99  |  77[H]  ----------------------------<  161[H]  3.8   |  25  |  4.03[H]    Ca    9.7      26 Feb 2025 07:53  Phos  4.8     02-26  Mg     2.1     02-26    TPro  6.7  /  Alb  2.7[L]  /  TBili  0.4  /  DBili  x   /  AST  41[H]  /  ALT  39  /  AlkPhos  129[H]  02-25    PT/INR - ( 26 Feb 2025 00:17 )   PT: 12.0 sec;   INR: 1.04 ratio         PTT - ( 26 Feb 2025 00:17 )  PTT:66.7 sec  CAPILLARY BLOOD GLUCOSE            Urinalysis Basic - ( 26 Feb 2025 07:53 )    Color: x / Appearance: x / SG: x / pH: x  Gluc: 161 mg/dL / Ketone: x  / Bili: x / Urobili: x   Blood: x / Protein: x / Nitrite: x   Leuk Esterase: x / RBC: x / WBC x   Sq Epi: x / Non Sq Epi: x / Bacteria: x        RADIOLOGY & ADDITIONAL TESTS:    Imaging Personally Reviewed:  [x] YES  [ ] NO    Consultant(s) Notes Reviewed:  [x] YES  [ ] NO    MEDICATIONS  (STANDING):  albuterol/ipratropium for Nebulization 3 milliLiter(s) Nebulizer every 6 hours  atorvastatin 20 milliGRAM(s) Oral at bedtime  buMETAnide Infusion 2 mG/Hr (10 mL/Hr) IV Continuous <Continuous>  chlorhexidine 0.12% Liquid 15 milliLiter(s) Oral Mucosa every 12 hours  cholecalciferol 1000 Unit(s) Oral daily  dexAMETHasone  Injectable 6 milliGRAM(s) IV Push daily  escitalopram 15 milliGRAM(s) Oral with breakfast  gabapentin Solution 150 milliGRAM(s) Oral every 12 hours  heparin  Infusion.  Unit(s)/Hr (17 mL/Hr) IV Continuous <Continuous>  influenza  Vaccine (HIGH DOSE) 0.5 milliLiter(s) IntraMuscular once  lacosamide IVPB 200 milliGRAM(s) IV Intermittent every 12 hours  lamoTRIgine 25 milliGRAM(s) Oral two times a day  levETIRAcetam  IVPB 1000 milliGRAM(s) IV Intermittent <User Schedule>  lurasidone 40 milliGRAM(s) Oral at bedtime  naloxegol 12.5 milliGRAM(s) Oral daily  pantoprazole   Suspension 40 milliGRAM(s) Oral daily  piperacillin/tazobactam IVPB.. 3.375 Gram(s) IV Intermittent every 8 hours  polyethylene glycol 3350 17 Gram(s) Oral daily  propofol Infusion 10 MICROgram(s)/kG/Min (5.62 mL/Hr) IV Continuous <Continuous>  senna 2 Tablet(s) Oral at bedtime    MEDICATIONS  (PRN):      Care Discussed with Consultants/Other Providers [x] YES  [ ] NO    Vital Signs Last 24 Hrs  T(C): 37.7 (26 Feb 2025 12:00), Max: 37.8 (26 Feb 2025 00:00)  T(F): 99.9 (26 Feb 2025 12:00), Max: 100 (26 Feb 2025 00:00)  HR: 93 (26 Feb 2025 14:00) (86 - 108)  BP: 113/65 (26 Feb 2025 14:00) (88/53 - 159/80)  BP(mean): 83 (26 Feb 2025 14:00) (65 - 112)  RR: 20 (26 Feb 2025 14:00) (17 - 31)  SpO2: 96% (26 Feb 2025 14:00) (87% - 100%)    Parameters below as of 26 Feb 2025 11:32  Patient On (Oxygen Delivery Method): ventilator      I&O's Summary    25 Feb 2025 07:01  -  26 Feb 2025 07:00  --------------------------------------------------------  IN: 3823.8 mL / OUT: 6235 mL / NET: -2411.2 mL    26 Feb 2025 07:01  -  26 Feb 2025 14:32  --------------------------------------------------------  IN: 890.4 mL / OUT: 925 mL / NET: -34.6 mL        PHYSICAL EXAM:  GENERAL: intubated, sedated  HEAD:  Atraumatic, Normocephalic  EYES: EOMI, PERRLA, conjunctiva and sclera clear  NECK: Supple, No JVD  CHEST/LUNG: mild decrease breath sounds bilaterally; No wheeze   HEART: Regular rate and rhythm; No murmurs, rubs, or gallops  ABDOMEN: Soft, Nontender, Nondistended; Bowel sounds present  Neuro:  intubated, sedated  EXTREMITIES:  2+ Peripheral Pulses, No clubbing, cyanosis, or edema  SKIN: No rashes or lesions

## 2025-02-26 NOTE — PROGRESS NOTE ADULT - ATTENDING COMMENTS
68 y/o M w/hx as above including metastatic testicular cancer, epilepsy on AEDs, chronic respiratory failure with hypoxia and hypercapnia on home O2, HFpEF admitted for acute on chronic respiratory failure with hypoxia and hypercapnia likely secondary to combination of COVID PNA, bacterial superinfection, and acute pulmonary edema due to acute on chronic HFpEF. Hypotension likely severe sepsis with septic shock. SHAGUFTA likely ATN +/- venous congestion.    # Acute on chronic respiratory failure with hypoxia and hypercapnia  # COVID PNA  # Bacterial PNA  # Acute pulmonary edema  # Acute on chronic HFpEF  # Hypotension  # Severe sepsis with septic shock  # SHAGUFTA  # Hyperntremia  # Epilepsy    - Continue AEDs  - Sedation as needed goal RASS -1 to 0  - Wean from vent as tolerated  - Restart pressors if needed  - Trend Cr, avoid nephrotoxins  - Diuresis goal net negative 1-2 L  - Complete course of abx  - Complete course of dexamethasone for COVID, completed remdesivir  - Free water for hypernatremia  - DVT prophylaxis

## 2025-02-26 NOTE — PROGRESS NOTE ADULT - ASSESSMENT
68 y/o M with PMH of HTN, HLD, VAZQUEZ on CPAP and home O2 (2LNC daytime), CKD, epilepsy, schizophrenia developmental delay, metastatic testicular CA. Recent admission at [facility] for acute respiratory failure in the setting of seizures, influenza infection, pulmonary edema, PNA requiring intubation in MICU. Was eventually discharged to rehab. Presents now with SOB, found to be COVID + at facility on 2/13. Febrile on admission to ED to 101.9F. Placed on Bipap for increased WOB. Pulmonary called to consult for acute respiratory failure.

## 2025-02-26 NOTE — PROGRESS NOTE ADULT - PROBLEM SELECTOR PLAN 1
-Recent admission for acute respiratory failure in the setting of grand mal seizures, influenza, PNA. S/p intubation in MICU, was extubated to AVAPS  -Now COVID +  -CXR with low lung volumes, mild congestion. Possible underlying infiltrate  -CT chest with b/l GGO, new since 1/30/25. Likely COVID PNA +/- superimposed bacterial PNA.   -Started on HFNC 2/18  -S/p multiple RRTs for hypoxia, increased WOB. Intubated s/p RRT on 2/23 & transferred to MICU  -Continue bronchodilators  -ABX per ID/MICU  -Pressure support trials as tolerated  -Keep sats >90%.  2/26: remsins intubated:  on 40% fio2:  per MICU :  cont antibiotics:  cxr with Improvement in upper lobes

## 2025-02-26 NOTE — PROGRESS NOTE ADULT - SUBJECTIVE AND OBJECTIVE BOX
Date of Service: 02-26-25 @ 15:59    Patient is a 69y old  Male who presents with a chief complaint of Acute on chronic hypoxemic respiratory failure (26 Feb 2025 10:49)      Any change in ROS: remains intubated:  sedated     MEDICATIONS  (STANDING):  albuterol/ipratropium for Nebulization 3 milliLiter(s) Nebulizer every 6 hours  atorvastatin 20 milliGRAM(s) Oral at bedtime  buMETAnide Infusion 2 mG/Hr (10 mL/Hr) IV Continuous <Continuous>  chlorhexidine 0.12% Liquid 15 milliLiter(s) Oral Mucosa every 12 hours  cholecalciferol 1000 Unit(s) Oral daily  dexAMETHasone  Injectable 6 milliGRAM(s) IV Push daily  escitalopram 15 milliGRAM(s) Oral with breakfast  gabapentin Solution 150 milliGRAM(s) Oral every 12 hours  heparin  Infusion.  Unit(s)/Hr (17 mL/Hr) IV Continuous <Continuous>  influenza  Vaccine (HIGH DOSE) 0.5 milliLiter(s) IntraMuscular once  lacosamide IVPB 200 milliGRAM(s) IV Intermittent every 12 hours  lamoTRIgine 25 milliGRAM(s) Oral two times a day  levETIRAcetam  IVPB 1000 milliGRAM(s) IV Intermittent <User Schedule>  lurasidone 40 milliGRAM(s) Oral at bedtime  naloxegol 12.5 milliGRAM(s) Oral daily  pantoprazole   Suspension 40 milliGRAM(s) Oral daily  piperacillin/tazobactam IVPB.. 3.375 Gram(s) IV Intermittent every 8 hours  polyethylene glycol 3350 17 Gram(s) Oral daily  propofol Infusion 10 MICROgram(s)/kG/Min (5.62 mL/Hr) IV Continuous <Continuous>  senna 2 Tablet(s) Oral at bedtime    MEDICATIONS  (PRN):    Vital Signs Last 24 Hrs  T(C): 37.6 (26 Feb 2025 16:00), Max: 37.8 (26 Feb 2025 00:00)  T(F): 99.7 (26 Feb 2025 16:00), Max: 100 (26 Feb 2025 00:00)  HR: 87 (26 Feb 2025 15:00) (86 - 108)  BP: 94/58 (26 Feb 2025 15:00) (88/53 - 159/80)  BP(mean): 71 (26 Feb 2025 15:00) (65 - 112)  RR: 21 (26 Feb 2025 15:00) (17 - 31)  SpO2: 95% (26 Feb 2025 15:00) (87% - 100%)    Parameters below as of 26 Feb 2025 11:32  Patient On (Oxygen Delivery Method): ventilator      Mode: AC/ CMV (Assist Control/ Continuous Mandatory Ventilation)  RR (machine): 16  TV (machine): 500  FiO2: 40  PEEP: 8  ITime: 1  MAP: 15  PIP: 37    I&O's Summary    25 Feb 2025 07:01  -  26 Feb 2025 07:00  --------------------------------------------------------  IN: 3823.8 mL / OUT: 6235 mL / NET: -2411.2 mL    26 Feb 2025 07:01  -  26 Feb 2025 15:59  --------------------------------------------------------  IN: 962.8 mL / OUT: 1175 mL / NET: -212.2 mL          Physical Exam:   GENERAL: NAD, well-groomed, well-developed  HEENT: BREE/   Atraumatic, Normocephalic  ENMT: No tonsillar erythema, exudates, or enlargement; Moist mucous membranes, Good dentition, No lesions  NECK: Supple, No JVD, Normal thyroid  CHEST/LUNG: Clear to auscultaion  CVS: Regular rate and rhythm; No murmurs, rubs, or gallops  GI: : Soft, Nontender, Nondistended; Bowel sounds present  NERVOUS SYSTEM:  intubated and on full vent support   EXTREMITIES: trace  edema  LYMPH: No lymphadenopathy noted  SKIN: No rashes or lesions  ENDOCRINOLOGY: No Thyromegaly  PSYCH: intubated     Labs:  ABG - ( 26 Feb 2025 12:03 )  pH, Arterial: 7.42  pH, Blood: x     /  pCO2: 38    /  pO2: 159   / HCO3: 25    / Base Excess: 0.2   /  SaO2: 99.1            40, 100, 24, 24                            7.3    7.55  )-----------( 103      ( 26 Feb 2025 00:17 )             23.2                         7.4    9.17  )-----------( 98       ( 25 Feb 2025 00:59 )             24.0                         7.9    8.76  )-----------( 102      ( 23 Feb 2025 22:48 )             25.9                         8.1    10.78 )-----------( 107      ( 23 Feb 2025 16:19 )             26.4                         9.2    9.74  )-----------( 105      ( 23 Feb 2025 06:14 )             29.8     02-26    142  |  99  |  77[H]  ----------------------------<  161[H]  3.8   |  25  |  4.03[H]  02-26    140  |  102  |  75[H]  ----------------------------<  142[H]  3.8   |  21[L]  |  4.01[H]  02-25    143  |  104  |  70[H]  ----------------------------<  177[H]  4.4   |  22  |  4.14[H]  02-25    146[H]  |  110[H]  |  67[H]  ----------------------------<  138[H]  3.5   |  19[L]  |  4.24[H]  02-24    147[H]  |  112[H]  |  64[H]  ----------------------------<  215[H]  4.1   |  20[L]  |  4.24[H]  02-24    147[H]  |  110[H]  |  63[H]  ----------------------------<  228[H]  4.4   |  20[L]  |  4.13[H]  02-23    147[H]  |  114[H]  |  60[H]  ----------------------------<  170[H]  4.2   |  19[L]  |  3.65[H]  02-23    151[H]  |  114[H]  |  54[H]  ----------------------------<  124[H]  4.1   |  21[L]  |  2.64[H]    Ca    9.7      26 Feb 2025 07:53  Ca    9.5      26 Feb 2025 00:17  Ca    9.7      25 Feb 2025 15:50  Ca    9.4      25 Feb 2025 00:59  Phos  4.8     02-26  Phos  4.8     02-26  Phos  4.6     02-25  Phos  4.6     02-25  Mg     2.1     02-26  Mg     2.1     02-26  Mg     2.2     02-25  Mg     2.2     02-25    TPro  6.7  /  Alb  2.7[L]  /  TBili  0.4  /  DBili  x   /  AST  41[H]  /  ALT  39  /  AlkPhos  129[H]  02-25  TPro  6.0  /  Alb  2.4[L]  /  TBili  0.4  /  DBili  x   /  AST  40  /  ALT  30  /  AlkPhos  120  02-25  TPro  5.8[L]  /  Alb  2.5[L]  /  TBili  0.4  /  DBili  x   /  AST  32  /  ALT  24  /  AlkPhos  119  02-24  TPro  6.1  /  Alb  2.6[L]  /  TBili  0.6  /  DBili  x   /  AST  28  /  ALT  26  /  AlkPhos  120  02-24  TPro  5.7[L]  /  Alb  2.4[L]  /  TBili  0.4  /  DBili  x   /  AST  29  /  ALT  25  /  AlkPhos  116  02-23    CAPILLARY BLOOD GLUCOSE          LIVER FUNCTIONS - ( 25 Feb 2025 15:50 )  Alb: 2.7 g/dL / Pro: 6.7 g/dL / ALK PHOS: 129 U/L / ALT: 39 U/L / AST: 41 U/L / GGT: x           PT/INR - ( 26 Feb 2025 00:17 )   PT: 12.0 sec;   INR: 1.04 ratio         PTT - ( 26 Feb 2025 00:17 )  PTT:66.7 sec  Urinalysis Basic - ( 26 Feb 2025 07:53 )    Color: x / Appearance: x / SG: x / pH: x  Gluc: 161 mg/dL / Ketone: x  / Bili: x / Urobili: x   Blood: x / Protein: x / Nitrite: x   Leuk Esterase: x / RBC: x / WBC x   Sq Epi: x / Non Sq Epi: x / Bacteria: x      D-Dimer Assay, Quantitative: 2027 ng/mL DDU (02-23 @ 06:14)        RECENT CULTURES:  02-21 @ 19:36 .Blood Blood       rad< from: Xray Chest 1 View- PORTABLE-Urgent (Xray Chest 1 View- PORTABLE-Urgent .) (02.26.25 @ 11:26) >  TECHNIQUE: Single frontal, portable view of the chest was obtained.    COMPARISON: Chest x-ray 2/23/2025.    FINDINGS:  Endotracheal tube terminates above the guero. Enteric tube terminates   beyond the GE junction and out of the field-of-view. Spinal fixation   hardware. Right-sided port with tip terminating in the right atrium.  The cardiac silhouette is not well evaluated on AP film.  Bibasilar hazy opacities. Improved aeration of the bilateral upper lobes.  Small left pleural effusion.  There is no pneumothorax or right pleural effusion.  No acute osseous abnormalities    IMPRESSION:  Improved aeration of the bilateral upper lobes.  Bibasilar hazy opacities.    --- End of Report ---          SHLOMO BLAKE MD; Resident Radiologist  This document has been electronically signed.  STEPHANIE HARMON MD; Attending Radiologist  This document has been electronically signed. Feb 26 2025 12:32PM    < end of copied text >           No growth at 4 days    02-21 @ 19:35 .Blood Blood                No growth at 4 days          RESPIRATORY CULTURES:          Studies  Chest X-RAY  CT SCAN Chest   Venous Dopplers: LE:   CT Abdomen  Others

## 2025-02-26 NOTE — PROGRESS NOTE ADULT - ASSESSMENT
Mr. Gr is a 69 year old male with PMHx of seizure disorder, sleep apnea, HTN, chronic idiopathic constipation, stage III CKD, severe obesity, secondary hyperparathyroidism, anemia, thrombocytopenia, hyperlipidemia, testicular cancer under treatment with Dr. Ovalles who is admitted for acute hypoxic respiratory failure secondary to COVID vs superimposed pneumonia with new onset thrombocytopenia. He was recently discharged 02/06/2025 after a tenous stay including intubation in the ICU for respiratory failure in setting of seizure. He had recovered and was discharged to rehab.      Former smoker, quit 14y prior, denied ETOH, drug use. Lives at home. Does not have children. Denies family history of blood disorders or malignancy.  Denies previous workplace related exposures.  Denies previous history of blood transfusions.  Denied history of thromboses.  Denied history of  requiring anticoagulation.     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  Margins were negative.  pT3Nx. CT C/A/P with few left para-aortic and left iliac chain lymph nodes largest measuring 8.1 cm left para-aortic node, bilateral subcentimeter noncalcified pulmonary nodules measuring up to 7 mm. AFP, LDH, hCG were all elevated. 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 Started on adjuvant therapy with Cisplatin+Etoposide, so far completed 4 cycles of treatment with cisplatin dose reduced 50% and Etoposide 50% due to thrombocytopenia.      02/19/2025: on HFNC, noted tachycardia and fever, Klebsiella in urine as well, thrombocytopenia stable/improving, no signs of active bleeding     02/20/2025: developed A.fib with RVR and was placed on heparin drip and pending cardiology eval    02/21/2025: awake, on AVAPS overnight, noted RRT for hypoxia as pt removed HFNC at some point in time, good response thereafter     02/22/2025:  continues on AVAPS this morning, noted RRT overnight for hypoxia, hypercapnia, ICU following, US LE negative for DVT, counts overall stable/improved     02/23/2025: progressive respiratory failure, noted ongoing RTT this morning with pending intubation and transfer to MICU     02/24/2025: intubated 02/23/2025, sedated, on pressor support, no signs of bleeding, counts noted, no signs of bleeding     02/25/2025: continues in MICU, intubated and sedated, no signs of bleeding      #Acute hypoxic respiratory failure  # COVID  - CT noted with bilateral GGO  - ID following  - Pulmonary following  - Antibiotics per primary team/MICU and ID   - Intubated 02/23/2025 AM, MICU     # Thrombocytopenia   - Did occur during most recent admission and improved to normal prior to discharge   - Without signs of bleeding  - Could be multifactorial, possibly due to infection   - Continue to trend  - Transfuse to maintain Plt > 10k unless actively bleeding then maintain > 50k     # Brain lesion  - pt with hx of seizures  - MRI brain now with 5.4 mm enhancing lesion in the LEFT middle cerebellar peduncle with associated edema suspicious for metastases  - MRI Lumbar negative for acute disease  - Small lesion without mass effect or edema, recommended to correlate per neurology if foci and locus are concordant with seizure activity  - Neurology recs noted, new lesion not likely to cause seizure  - It is rare, but nonetheless not impossible, for testicular cancer to be metastatic to the brain  - He will need another PET-CT, can be completed outpatient once stable if concern can proceed with biopsy at that time   - Previous endocrinology eval for thyroid nodule noted  - AFP/[facility] normal,  previously      # 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 Ovalles, agree with current plan  - Follow up as outpatient with Franki Ovalles MD     # Acute kidney injury on CKD Stage IIIA  - Likely multifactorial  - Nephrology consult and recs noted  - Continue to trend     # Normocytic anemia  - Chronic, has hx of PRBC during chemo 07/2024  - Noted Anti E, Anti-K Anti-C antibodies previously  - Monitor for bleeding  - Recommend to transfuse to maintain Hb > 7    # Klebsiella UTI  -Antibiotics per ID and primary team       Recommendations  - Trend with CBC daily   - Transfuse to maintain Hb > 7   - Transfuse to maintain Plt >10k unless active bleeding then >50k   - Monitor renal function  - Antibiotics per primary team/MICU and ID   - Cardiology follow up to address anticoagulation, currently on heparin drip   - f/u ongoing ICU recs         Thank you for allowing me to participate in the care of Mr. Gr please do not hesitate to call or text me if you have further questions or concerns.     Brayan Mart MD  [facility] [place]   Division of Hematology/Oncology  [facility], [address]  [place] [address]  P: [phone]  F: [phone]    Attestation:    ----Pt evaluated including face-to-face interaction in addition to chart review, reviewing treatment plan, and managing the patient’s chronic diagnoses as listed in the assessment----        Mr. Gr is a 69 year old male with PMHx of seizure disorder, sleep apnea, HTN, chronic idiopathic constipation, stage III CKD, severe obesity, secondary hyperparathyroidism, anemia, thrombocytopenia, hyperlipidemia, testicular cancer under treatment with Dr. Ovalles who is admitted for acute hypoxic respiratory failure secondary to COVID vs superimposed pneumonia with new onset thrombocytopenia. He was recently discharged 02/06/2025 after a tenous stay including intubation in the ICU for respiratory failure in setting of seizure. He had recovered and was discharged to rehab.      Former smoker, quit 14y prior, denied ETOH, drug use. Lives at home. Does not have children. Denies family history of blood disorders or malignancy.  Denies previous workplace related exposures.  Denies previous history of blood transfusions.  Denied history of thromboses.  Denied history of  requiring anticoagulation.     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  Margins were negative.  pT3Nx. CT C/A/P with few left para-aortic and left iliac chain lymph nodes largest measuring 8.1 cm left para-aortic node, bilateral subcentimeter noncalcified pulmonary nodules measuring up to 7 mm. AFP, LDH, hCG were all elevated. 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 Started on adjuvant therapy with Cisplatin+Etoposide, so far completed 4 cycles of treatment with cisplatin dose reduced 50% and Etoposide 50% due to thrombocytopenia.      02/19/2025: on HFNC, noted tachycardia and fever, Klebsiella in urine as well, thrombocytopenia stable/improving, no signs of active bleeding     02/20/2025: developed A.fib with RVR and was placed on heparin drip and pending cardiology eval    02/21/2025: awake, on AVAPS overnight, noted RRT for hypoxia as pt removed HFNC at some point in time, good response thereafter     02/22/2025:  continues on AVAPS this morning, noted RRT overnight for hypoxia, hypercapnia, ICU following, US LE negative for DVT, counts overall stable/improved     02/23/2025: progressive respiratory failure, noted ongoing RTT this morning with pending intubation and transfer to MICU     02/24/2025: intubated 02/23/2025, sedated, on pressor support, no signs of bleeding, counts noted, no signs of bleeding     02/25/2025: continues in MICU, intubated and sedated, no signs of bleeding    02/26/2025: continues in MICU, intubated, without signs of bleeding, continues on heparin drip         #Acute hypoxic respiratory failure  # COVID  - CT noted with bilateral GGO  - ID following  - Pulmonary following  - Antibiotics per primary team/MICU and ID   - Intubated 02/23/2025 AM, MICU     # Thrombocytopenia   - Did occur during most recent admission and improved to normal prior to discharge   - Without signs of bleeding  - Could be multifactorial, possibly due to infection   - Continue to trend  - Transfuse to maintain Plt > 10k unless actively bleeding then maintain > 50k     # Brain lesion  - pt with hx of seizures  - MRI brain now with 5.4 mm enhancing lesion in the LEFT middle cerebellar peduncle with associated edema suspicious for metastases  - MRI Lumbar negative for acute disease  - Small lesion without mass effect or edema, recommended to correlate per neurology if foci and locus are concordant with seizure activity  - Neurology recs noted, new lesion not likely to cause seizure  - It is rare, but nonetheless not impossible, for testicular cancer to be metastatic to the brain  - He will need another PET-CT, can be completed outpatient once stable if concern can proceed with biopsy at that time   - Previous endocrinology eval for thyroid nodule noted  - AFP/[facility] normal,  previously      # 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 Ovalles, agree with current plan  - Follow up as outpatient with Franki Ovalles MD     # Acute kidney injury on CKD Stage IIIA  - Likely multifactorial  - Nephrology consult and recs noted  - Continue to trend     # Normocytic anemia  - Chronic, has hx of PRBC during chemo 07/2024  - Noted Anti E, Anti-K Anti-C antibodies previously  - Monitor for bleeding  - Recommend to transfuse to maintain Hb > 7    # Klebsiella UTI  -Antibiotics per ID and primary team       Recommendations  - Trend with CBC daily   - Transfuse to maintain Hb > 7   - Transfuse to maintain Plt >10k unless active bleeding then >50k   - Monitor renal function  - Antibiotics per primary team/MICU and ID   - Cardiology follow up to address anticoagulation, currently on heparin drip   - f/u ongoing ICU recs         Thank you for allowing me to participate in the care of Mr. Gr please do not hesitate to call or text me if you have further questions or concerns.     Brayan Mart MD  [facility] [place]   Division of Hematology/Oncology  [address] [place] [address], [address]  [place] [address]  P: [phone]  F: [phone]    Attestation:    ----Pt evaluated including face-to-face interaction in addition to chart review, reviewing treatment plan, and managing the patient’s chronic diagnoses as listed in the assessment----

## 2025-02-26 NOTE — PROGRESS NOTE ADULT - ASSESSMENT
69 year old male with a past medical history of hypertension, hyperlipemia VAZQUEZ (on CPAP at bedtime, NC 2 liters during the day), CKD stage 3, epilepsy, schizophrenia, developmental delay, metastatic testicular cancer (s/p orchiectomy, actively on chemotherapy, last dose of etoposide/cisplatin on 6/2024), presenting with acute on chronic hypoxemic respiratory failure, secondary to (a) COVID-19 infection, (b) superimposed pneumonia after recent influenza infection, and/or (c) fluid overload.     MICU consulted and accepted after RRT due to increased work of breathing    NEURO:  #Mental status:  - Sedated, however, was AAOx2 prior to increased work of breathing that required intubation  - continue with propofol    #Epilepsy:  - Continue with home meds which include Lacosamide, Lurasidone, Lamotrigine - lamotrigine restarted 2/24 after being held on 2/18, per pharmacy restarted at 25 BID, uptitrate over weeks    #Brain Mets:  - MRI brain now with 5.4 mm enhancing lesion in the LEFT middle cerebellar peduncle with associated edema suspicious for metastases    CV:  #Echo:  Recent echo last admission showing Left ventricular cavity is normal in size. Left ventricular systolic function is normal with an ejection fraction of 62 %. There are no regional wall motion abnormalities seen. Normal right ventricular cavity size and normal right ventricular systolic function.   - Repeat echo continuing to show EF 70%    #Afib w/RVR  - New onset Afib RVR (on tele, confirmed with EKG 2/19)   - Plan: Started metoprolol 5mg IVP q6 hours & Hep gtt -> metoprolol held iso hypotension  - If rate remains uncontrolled, can start Cardizem drip at 5mg/h  - c/w Heparin drip    #HTN:  - On Hydralazine 25mg TID, Amlodipine 5mg, held iso current hypotension, currently on midodrine    PULM:  #Vent   - Due to increased work of breathing, now intubated  - Failed SBT 2/25 after 4 minutes    #AHRF  - Patient admitted to the hospital for AHRF, found to be COVID (+)  - Likely 2/2 PNA, does not appear to CHF decompensation  - Diuretics currently being held  - Continue with airway clearance regimen which includes duoneb,     RENAL:  #SHAGUFTA:   - Hx of CKD stage 3, Cr 2.38 at baseline, seems to be plateauing at 4.24  - In setting of COVID (+)  - Pre-renal, FeUrea 29%  - Monitor Cr  - Avoid nephrotoxic meds  - Wakefield in place  - Bumex 2 gtt, augmented output this AM, will continue for net negative 1-2L  - Monitor BMP q12      #Electrolyte abnormalities  Hypernatremia  - Continue to monitor  - If uptrending can consider D5W  - Free water deficit 1.4  - F/u urine studies  - Nephro recs appreciated    GI:  No acute issues     #Diet: Tube feeds via OG tube    #Bowel regimen: Movantik     #Bowel Regimen  - On Senna and Miralax    ENDO:  No acute issues  Thyroid nodule noted in prior notes    HEMATOLOGIC:  #Thrombocytopenic  - Unclear origin  - Concern for possible mets to bone marrow? vs due to infection  - Continue to monitor  - Transfuse to maintain Plt>10K unless actively bleeding then maintain >50K  - Heme-Onc recs appreciated    #DVT prophylaxis   - SCD    ID:  #COVID-19  - Concern for possible superimposed PNA   - Continue Zosyn   - S/p Remdesivir  - Blood cx from 2 days ago negative    SKIN:  #Lines:  2x PIV    Ethics:  - Full Code  - Contact: Sister Ester [phone]  69 year old male with a past medical history of hypertension, hyperlipemia VAZQUEZ (on CPAP at bedtime, NC 2 liters during the day), CKD stage 3, epilepsy, schizophrenia, developmental delay, metastatic testicular cancer (s/p orchiectomy, actively on chemotherapy, last dose of etoposide/cisplatin on 6/2024), presenting with acute on chronic hypoxemic respiratory failure, secondary to (a) COVID-19 infection, (b) superimposed pneumonia after recent influenza infection, and/or (c) fluid overload.     MICU consulted and accepted after RRT due to increased work of breathing    NEURO:  #Mental status:  - Sedated, however, was AAOx2 prior to increased work of breathing that required intubation  - continue with propofol, wean as tolerated    #Epilepsy:  - Continue with home meds which include Lacosamide, Lurasidone, Lamotrigine - lamotrigine restarted 2/24 after being held on 2/18, per pharmacy restarted at 25 BID, uptitrate over weeks    #Brain Mets:  - MRI brain now with 5.4 mm enhancing lesion in the LEFT middle cerebellar peduncle with associated edema suspicious for metastases    CV:  #Echo:  Recent echo last admission showing Left ventricular cavity is normal in size. Left ventricular systolic function is normal with an ejection fraction of 62 %. There are no regional wall motion abnormalities seen. Normal right ventricular cavity size and normal right ventricular systolic function.   - Repeat echo continuing to show EF 70%    #Afib w/RVR  - New onset Afib RVR (on tele, confirmed with EKG 2/19)   - Plan: Started metoprolol 5mg IVP q6 hours & Hep gtt -> metoprolol held iso hypotension  - If rate remains uncontrolled, can start Cardizem drip at 5mg/h  - c/w Heparin drip    #HTN:  - On Hydralazine 25mg TID, Amlodipine 5mg, held iso current hypotension, currently on midodrine    PULM:  #Vent   - Due to increased work of breathing, now intubated  - SBT with 12/10 today    #AHRF  - Patient admitted to the hospital for AHRF, found to be COVID (+)  - Likely 2/2 PNA, does not appear to CHF decompensation  - Diuretics currently being held  - Continue with airway clearance regimen which includes duoneb,     RENAL:  #SHAGUFTA:   - Hx of CKD stage 3, Cr 2.38 at baseline, seems to be plateauing at 4.24  - In setting of COVID (+)  - Pre-renal, FeUrea 29%  - Monitor Cr  - Avoid nephrotoxic meds  - Wakefield in place  - Bumex 2 gtt  - Monitor BMP q12      #Electrolyte abnormalities  Hypernatremia  - Continue to monitor  - If uptrending can consider D5W  - Free water deficit 1.4  - F/u urine studies  - Nephro recs appreciated    GI:  No acute issues     #Diet: Tube feeds via OG tube    #Bowel regimen: Movantik     #Bowel Regimen  - On Senna and Miralax    ENDO:  No acute issues  Thyroid nodule noted in prior notes    HEMATOLOGIC:  #Thrombocytopenic  - Unclear origin  - Concern for possible mets to bone marrow? vs due to infection  - Continue to monitor  - Transfuse to maintain Plt>10K unless actively bleeding then maintain >50K  - Heme-Onc recs appreciated    #DVT prophylaxis   - SCD    ID:  #COVID-19  - Concern for possible superimposed PNA   - Completed zosyn  - S/p Remdesivir  - Blood cx from 2 days ago negative    SKIN:  #Lines:  2x PIV    Ethics:  - Full Code  - Contact: Sister Ester [phone]

## 2025-02-26 NOTE — PROGRESS NOTE ADULT - PROBLEM SELECTOR PLAN 7
Continue with home atorvastatin 20 mg bedtime for HLD, vitamin D supplementation, pantoprazole 40 mg daily, senna 2 tab bedtime, and Miralax 17 gram daily.     General  - DVT prophylaxis: heparin 5000 units q8h --> hep gtt per ICU  - Diet: regular   - Medication reconciliation: complete   - Code: Full   - Medications: Tylenol 650 mg q6h as needed for pain, Maalox 30 mL q4h as needed for acid reflux, melatonin 3 mg bedtime as needed for insomnia, Zofran 4 mg IV q8h as needed for nausea/vomiting      2/16/25 --> plan was discussed with patient's brother Fernando (over the phone, [phone]) in detail. He agrees with plan. All questions answered. He asked that I update Ester (sister, [phone]); she was called and updated as well, also agrees with the plan, also answered all questions.

## 2025-02-26 NOTE — PROGRESS NOTE ADULT - SUBJECTIVE AND OBJECTIVE BOX
DATE OF SERVICE: 02-26-25 @ 15:56    Patient is a 69y old  Male who presents with a chief complaint of Acute on chronic hypoxemic respiratory failure (26 Feb 2025 10:49)      INTERVAL HISTORY: Feels ok.     REVIEW OF SYSTEMS:  CONSTITUTIONAL: No weakness  EYES/ENT: No visual changes;  No throat pain   NECK: No pain or stiffness  RESPIRATORY: No cough, wheezing; No shortness of breath  CARDIOVASCULAR: No chest pain or palpitations  GASTROINTESTINAL: No abdominal  pain. No nausea, vomiting, or hematemesis  GENITOURINARY: No dysuria, frequency or hematuria  NEUROLOGICAL: No stroke like symptoms  SKIN: No rashes    TELEMETRY Personally reviewed: SR   	  MEDICATIONS:  buMETAnide Infusion 2 mG/Hr IV Continuous <Continuous>        PHYSICAL EXAM:  T(C): 37.7 (02-26-25 @ 12:00), Max: 37.8 (02-26-25 @ 00:00)  HR: 87 (02-26-25 @ 15:00) (86 - 108)  BP: 94/58 (02-26-25 @ 15:00) (88/53 - 159/80)  RR: 21 (02-26-25 @ 15:00) (17 - 31)  SpO2: 95% (02-26-25 @ 15:00) (87% - 100%)  Wt(kg): --  I&O's Summary    25 Feb 2025 07:01  -  26 Feb 2025 07:00  --------------------------------------------------------  IN: 3823.8 mL / OUT: 6235 mL / NET: -2411.2 mL    26 Feb 2025 07:01  -  26 Feb 2025 15:56  --------------------------------------------------------  IN: 951.6 mL / OUT: 1175 mL / NET: -223.4 mL          Appearance: In no distress	  HEENT:    PERRL, EOMI	  Cardiovascular:  S1 S2, No JVD  Respiratory: Lungs clear to auscultation	  Gastrointestinal:  Soft, Non-tender, + BS	  Vascularature:  No edema of LE  Psychiatric: Appropriate affect   Neuro: no acute focal deficits                               7.3    7.55  )-----------( 103      ( 26 Feb 2025 00:17 )             23.2     02-26    142  |  99  |  77[H]  ----------------------------<  161[H]  3.8   |  25  |  4.03[H]    Ca    9.7      26 Feb 2025 07:53  Phos  4.8     02-26  Mg     2.1     02-26    TPro  6.7  /  Alb  2.7[L]  /  TBili  0.4  /  DBili  x   /  AST  41[H]  /  ALT  39  /  AlkPhos  129[H]  02-25        Labs personally reviewed      ASSESSMENT/PLAN: 	    69 year old male with a past medical history of hypertension, hyperlipemia VAZQUEZ (on CPAP at bedtime, NC 2 liters during the day), CKD stage 3, epilepsy, schizophrenia, developmental delay, metastatic testicular cancer (s/p orchiectomy, actively on chemotherapy, last dose of etoposide/cisplatin on 6/2024), presenting with acute on chronic hypoxemic respiratory failure, secondary to (a) COVID-19 infection, (b) superimposed pneumonia after recent influenza infection, and/or (c) fluid overload.     Problem/Plan - 1:  ·  Problem: Acute on chronic respiratory failure with hypoxia.   ·  Plan: Likely 2/2 PNA, HF  - Appreciate pulmonology.  - Transferred to MICU for hypoxia. Appreciate MICU care.   - 2/24 started on Bumex gtt at 2mg/hr  - 2/26 No appreciable JVD or peripheral edema. CXR with bibasilar hazy opacities. BP now soft with labile but stable Cr. Does not appear to be a HF. Recommend de-escalation of diuretics. CT Chest pending for further eval of hypoxia.     Problem/Plan - 2:  ·  Problem: SHAGUFTA (acute kidney injury).   ·  Plan: Has a history of CKD stage 3, Cr 2.38 at baseline. Presents with Cr 3.27.   - Nephrology following  - Will trend closely while on Bumex gtt     Problem/Plan - 3:  ·  Problem: Chronic heart failure with preserved ejection fraction.   ·  Plan: TTE done here 1/17/25 technically difficult, but LV systolic fxn appears nl without any regional wall motion abnormalities  - Repeat TTE pending  - BNP 5000 <---1100 but appears euvolemic   - 2/24 decreased UOP and net positive, bumex gtt started at 2mg/hr for goal -1-2 L,  per MICU     Problem/Plan - 4:  ·  Problem: New onset Afib RVR (on tele, confirmed with EKG 2/19)   ·  Plan: Started metoprolol 5mg IVP q6 hours & Hep gtt  - If rate remains uncontrolled, can start Cardizem drip at 5mg/h  - Initiated hep gtt and will transition to Eliquis     Problem/Plan - 5:  ·  Problem: HTN    ·  Plan: Start Hydral 25mg TID & amlodipine 5mg for uncontrolled BP (2/19)  - improved  - Hold BP meds for shock state. Can resume when improved.     Problem/Plan - 6:  ·  Problem: Prophylactic measure.   ·  Plan:  DVT prophylaxis: heparin gtt        Magaly Laird, REILLY-DARIAN Andrew DO [facility]  Cardiovascular Medicine  [address], [address]  Available through call or text on [employer]  Office: [phone]

## 2025-02-26 NOTE — PROGRESS NOTE ADULT - SUBJECTIVE AND OBJECTIVE BOX
*******************************  Navya Michelle PGY-3  *******************************    INTERVAL HPI/OVERNIGHT EVENTS: No overnight events. Bumex transitioned to pushes    SUBJECTIVE: Patient seen and examined at bedside.     OBJECTIVE:    VITAL SIGNS:  ICU Vital Signs Last 24 Hrs  T(C): 37.8 (26 Feb 2025 08:00), Max: 37.8 (26 Feb 2025 00:00)  T(F): 100 (26 Feb 2025 08:00), Max: 100 (26 Feb 2025 00:00)  HR: 92 (26 Feb 2025 08:00) (88 - 108)  BP: 147/78 (26 Feb 2025 08:00) (97/55 - 147/78)  BP(mean): 105 (26 Feb 2025 08:00) (71 - 105)  ABP: --  ABP(mean): --  RR: 21 (26 Feb 2025 08:00) (17 - 29)  SpO2: 92% (26 Feb 2025 08:00) (87% - 100%)    O2 Parameters below as of 26 Feb 2025 08:00  Patient On (Oxygen Delivery Method): ventilator    O2 Concentration (%): 40      Mode: AC/ CMV (Assist Control/ Continuous Mandatory Ventilation), RR (machine): 16, TV (machine): 500, FiO2: 40, PEEP: 8, ITime: 0.75, MAP: 15, PIP: 34    02-25 @ 07:01  -  02-26 @ 07:00  --------------------------------------------------------  IN: 3823.8 mL / OUT: 6235 mL / NET: -2411.2 mL    02-26 @ 07:01  -  02-26 @ 08:29  --------------------------------------------------------  IN: 111.2 mL / OUT: 200 mL / NET: -88.8 mL      CAPILLARY BLOOD GLUCOSE            PHYSICAL EXAM:  GENERAL: Intubated sedated  HEENT: NCAT  NECK: supple without JVD  CHEST/LUNG: breath sounds bilaterally  CARDIOVASCULAR: regular rate and rhythm, normal S1 and S2, no murmur/rub/gallop  ABDOMEN: positive bowel sounds, non-tender, non-distended, no organomegaly   MUSCULOSKELETAL:  moving all four extremities   EXTREMITIES: no lower extremity edema  NEUROLOGY: sedated        MEDICATIONS:  MEDICATIONS  (STANDING):  albuterol/ipratropium for Nebulization 3 milliLiter(s) Nebulizer every 6 hours  atorvastatin 20 milliGRAM(s) Oral at bedtime  buMETAnide Injectable 2 milliGRAM(s) IV Push <User Schedule>  chlorhexidine 0.12% Liquid 15 milliLiter(s) Oral Mucosa every 12 hours  cholecalciferol 1000 Unit(s) Oral daily  dexAMETHasone  Injectable 6 milliGRAM(s) IV Push daily  escitalopram 15 milliGRAM(s) Oral with breakfast  gabapentin Solution 150 milliGRAM(s) Oral every 12 hours  heparin  Infusion.  Unit(s)/Hr (17 mL/Hr) IV Continuous <Continuous>  influenza  Vaccine (HIGH DOSE) 0.5 milliLiter(s) IntraMuscular once  lacosamide IVPB 200 milliGRAM(s) IV Intermittent every 12 hours  lamoTRIgine 25 milliGRAM(s) Oral two times a day  levETIRAcetam  IVPB 1000 milliGRAM(s) IV Intermittent <User Schedule>  lurasidone 40 milliGRAM(s) Oral at bedtime  naloxegol 12.5 milliGRAM(s) Oral daily  pantoprazole   Suspension 40 milliGRAM(s) Oral daily  polyethylene glycol 3350 17 Gram(s) Oral daily  propofol Infusion 10 MICROgram(s)/kG/Min (5.62 mL/Hr) IV Continuous <Continuous>  senna 2 Tablet(s) Oral at bedtime    MEDICATIONS  (PRN):      ALLERGIES:  Allergies    seasonal allergies (Unknown)  penicillin (Hives)    Intolerances    latex (Rash)      LABS:                        7.3    7.55  )-----------( 103      ( 26 Feb 2025 00:17 )             23.2     02-26    142  |  99  |  77[H]  ----------------------------<  161[H]  3.8   |  25  |  4.03[H]    Ca    9.7      26 Feb 2025 07:53  Phos  4.8     02-26  Mg     2.1     02-26    TPro  6.7  /  Alb  2.7[L]  /  TBili  0.4  /  DBili  x   /  AST  41[H]  /  ALT  39  /  AlkPhos  129[H]  02-25    PT/INR - ( 26 Feb 2025 00:17 )   PT: 12.0 sec;   INR: 1.04 ratio         PTT - ( 26 Feb 2025 00:17 )  PTT:66.7 sec  Urinalysis Basic - ( 26 Feb 2025 07:53 )    Color: x / Appearance: x / SG: x / pH: x  Gluc: 161 mg/dL / Ketone: x  / Bili: x / Urobili: x   Blood: x / Protein: x / Nitrite: x   Leuk Esterase: x / RBC: x / WBC x   Sq Epi: x / Non Sq Epi: x / Bacteria: x        RADIOLOGY & ADDITIONAL TESTS: Reviewed.     *******************************  Navya Michelle PGY-3  *******************************    INTERVAL HPI/OVERNIGHT EVENTS: No overnight events. Bumex transitioned to pushes. Some desaturations noted while on ventilator at rest and while being turned.     SUBJECTIVE: Patient seen and examined at bedside. Unable to engage in interview    OBJECTIVE:    VITAL SIGNS:  ICU Vital Signs Last 24 Hrs  T(C): 37.8 (26 Feb 2025 08:00), Max: 37.8 (26 Feb 2025 00:00)  T(F): 100 (26 Feb 2025 08:00), Max: 100 (26 Feb 2025 00:00)  HR: 92 (26 Feb 2025 08:00) (88 - 108)  BP: 147/78 (26 Feb 2025 08:00) (97/55 - 147/78)  BP(mean): 105 (26 Feb 2025 08:00) (71 - 105)  ABP: --  ABP(mean): --  RR: 21 (26 Feb 2025 08:00) (17 - 29)  SpO2: 92% (26 Feb 2025 08:00) (87% - 100%)    O2 Parameters below as of 26 Feb 2025 08:00  Patient On (Oxygen Delivery Method): ventilator    O2 Concentration (%): 40      Mode: AC/ CMV (Assist Control/ Continuous Mandatory Ventilation), RR (machine): 16, TV (machine): 500, FiO2: 40, PEEP: 8, ITime: 0.75, MAP: 15, PIP: 34    02-25 @ 07:01  -  02-26 @ 07:00  --------------------------------------------------------  IN: 3823.8 mL / OUT: 6235 mL / NET: -2411.2 mL    02-26 @ 07:01  -  02-26 @ 08:29  --------------------------------------------------------  IN: 111.2 mL / OUT: 200 mL / NET: -88.8 mL      CAPILLARY BLOOD GLUCOSE            PHYSICAL EXAM:  GENERAL: Intubated sedated  HEENT: NCAT  NECK: supple without JVD  CHEST/LUNG: breath sounds bilaterally  CARDIOVASCULAR: regular rate and rhythm, normal S1 and S2, no murmur/rub/gallop  ABDOMEN: positive bowel sounds, non-distended, no organomegaly   EXTREMITIES: no lower extremity edema  NEUROLOGY: sedated        MEDICATIONS:  MEDICATIONS  (STANDING):  albuterol/ipratropium for Nebulization 3 milliLiter(s) Nebulizer every 6 hours  atorvastatin 20 milliGRAM(s) Oral at bedtime  buMETAnide Injectable 2 milliGRAM(s) IV Push <User Schedule>  chlorhexidine 0.12% Liquid 15 milliLiter(s) Oral Mucosa every 12 hours  cholecalciferol 1000 Unit(s) Oral daily  dexAMETHasone  Injectable 6 milliGRAM(s) IV Push daily  escitalopram 15 milliGRAM(s) Oral with breakfast  gabapentin Solution 150 milliGRAM(s) Oral every 12 hours  heparin  Infusion.  Unit(s)/Hr (17 mL/Hr) IV Continuous <Continuous>  influenza  Vaccine (HIGH DOSE) 0.5 milliLiter(s) IntraMuscular once  lacosamide IVPB 200 milliGRAM(s) IV Intermittent every 12 hours  lamoTRIgine 25 milliGRAM(s) Oral two times a day  levETIRAcetam  IVPB 1000 milliGRAM(s) IV Intermittent <User Schedule>  lurasidone 40 milliGRAM(s) Oral at bedtime  naloxegol 12.5 milliGRAM(s) Oral daily  pantoprazole   Suspension 40 milliGRAM(s) Oral daily  polyethylene glycol 3350 17 Gram(s) Oral daily  propofol Infusion 10 MICROgram(s)/kG/Min (5.62 mL/Hr) IV Continuous <Continuous>  senna 2 Tablet(s) Oral at bedtime    MEDICATIONS  (PRN):      ALLERGIES:  Allergies    seasonal allergies (Unknown)  penicillin (Hives)    Intolerances    latex (Rash)      LABS:                        7.3    7.55  )-----------( 103      ( 26 Feb 2025 00:17 )             23.2     02-26    142  |  99  |  77[H]  ----------------------------<  161[H]  3.8   |  25  |  4.03[H]    Ca    9.7      26 Feb 2025 07:53  Phos  4.8     02-26  Mg     2.1     02-26    TPro  6.7  /  Alb  2.7[L]  /  TBili  0.4  /  DBili  x   /  AST  41[H]  /  ALT  39  /  AlkPhos  129[H]  02-25    PT/INR - ( 26 Feb 2025 00:17 )   PT: 12.0 sec;   INR: 1.04 ratio         PTT - ( 26 Feb 2025 00:17 )  PTT:66.7 sec  Urinalysis Basic - ( 26 Feb 2025 07:53 )    Color: x / Appearance: x / SG: x / pH: x  Gluc: 161 mg/dL / Ketone: x  / Bili: x / Urobili: x   Blood: x / Protein: x / Nitrite: x   Leuk Esterase: x / RBC: x / WBC x   Sq Epi: x / Non Sq Epi: x / Bacteria: x        RADIOLOGY & ADDITIONAL TESTS: Reviewed.

## 2025-02-26 NOTE — PROGRESS NOTE ADULT - PROBLEM SELECTOR PLAN 3
Has a history of CKD stage 3, Cr 2.38 at baseline. Presents with Cr 3.27. Likely pre-renal.   - Renal US no hydronephrosis   - Fe Urea = 29%  - Monitor Cr daily   - Avoid nephrotoxins, dose medication to GFR  - Appreciate nephrology  - bumex gtt

## 2025-02-26 NOTE — PROGRESS NOTE ADULT - SUBJECTIVE AND OBJECTIVE BOX
[facility]  JACINTO Horton Y. Patel, S. Shah, G. Casimir  [phone]  ([phone] - weekdays after 5pm and weekends)    Name: OSCAR MILAN  Age/Gender: 69y Male  MRN: [id number]    Interval History:  Patient seen and examined this morning.   Remains intubated, briefly awakens to name.  Unable to obtain ROS.   Notes reviewed. Afebrile.   Allergies: seasonal allergies (Unknown)  penicillin (Hives)      Objective:  Vitals:   T(F): 100 (02-26-25 @ 08:00), Max: 100 (02-26-25 @ 00:00)  HR: 101 (02-26-25 @ 10:00) (86 - 108)  BP: 159/80 (02-26-25 @ 10:00) (97/55 - 159/80)  RR: 31 (02-26-25 @ 10:00) (17 - 31)  SpO2: 98% (02-26-25 @ 10:00) (87% - 100%)  Physical Examination:  General: no acute distress  HEENT: normocephalic, atraumatic, ETT  Respiratory: breathing comfortably   Cardiovascular: S1S2 present  Gastrointestinal: obese, soft, nondistended  Extremities: no LE edema, no cyanosis  Skin: no visible rash    Laboratory Studies:  CBC:                       7.3    7.55  )-----------( 103      ( 26 Feb 2025 00:17 )             23.2     WBC Trend:  7.55 02-26-25 @ 00:17  9.17 02-25-25 @ 00:59  8.76 02-23-25 @ 22:48  10.78 02-23-25 @ 16:19  9.74 02-23-25 @ 06:14  8.59 02-22-25 @ 06:08  7.85 02-21-25 @ 19:44  8.68 02-21-25 @ 06:30  5.88 02-20-25 @ 06:17  5.83 02-19-25 @ 17:51    CMP: 02-26    142  |  99  |  77[H]  ----------------------------<  161[H]  3.8   |  25  |  4.03[H]    Ca    9.7      26 Feb 2025 07:53  Phos  4.8     02-26  Mg     2.1     02-26    TPro  6.7  /  Alb  2.7[L]  /  TBili  0.4  /  DBili  x   /  AST  41[H]  /  ALT  39  /  AlkPhos  129[H]  02-25    Creatinine: 4.03 mg/dL (02-26-25 @ 07:53)  Creatinine: 4.01 mg/dL (02-26-25 @ 00:17)  Creatinine: 4.14 mg/dL (02-25-25 @ 15:50)  Creatinine: 4.24 mg/dL (02-25-25 @ 00:59)  Creatinine: 4.24 mg/dL (02-24-25 @ 15:24)  Creatinine: 4.13 mg/dL (02-24-25 @ 11:38)  Creatinine: 3.65 mg/dL (02-23-25 @ 22:48)  Creatinine: 2.64 mg/dL (02-23-25 @ 06:11)  Creatinine: 2.47 mg/dL (02-22-25 @ 06:08)  Creatinine: 2.54 mg/dL (02-21-25 @ 19:36)  Creatinine: 2.44 mg/dL (02-21-25 @ 14:13)  Creatinine: 0.68 mg/dL (02-21-25 @ 01:02)  Creatinine: 2.31 mg/dL (02-20-25 @ 06:17)      LIVER FUNCTIONS - ( 25 Feb 2025 15:50 )  Alb: 2.7 g/dL / Pro: 6.7 g/dL / ALK PHOS: 129 U/L / ALT: 39 U/L / AST: 41 U/L / GGT: x           Microbiology: reviewed   Culture - Blood (collected 02-21-25 @ 19:36)  Source: .Blood Blood  Preliminary Report (02-26-25 @ 02:01):    No growth at 4 days    Culture - Blood (collected 02-21-25 @ 19:35)  Source: .Blood Blood  Preliminary Report (02-26-25 @ 02:01):    No growth at 4 days    Culture - Blood (collected 02-19-25 @ 00:18)  Source: .Blood Blood-Peripheral  Final Report (02-24-25 @ 03:01):    No growth at 5 days    Culture - Blood (collected 02-19-25 @ 00:18)  Source: .Blood Blood-Peripheral  Final Report (02-24-25 @ 03:01):    No growth at 5 days    Culture - Sputum (collected 02-17-25 @ 22:23)  Source: .Sputum Sputum  Gram Stain (02-18-25 @ 06:21):    Few polymorphonuclear leukocytes per low power field    No Squamous epithelial cells per low power field    Few Gram Variable Rods seen per oil power field    Rare Gram positive cocci in pairs seen per oil power field  Final Report (02-19-25 @ 16:20):    Commensal lucia consistent with body site    Culture - Urine (collected 02-16-25 @ 11:47)  Source: Clean Catch Clean Catch (Midstream)  Final Report (02-18-25 @ 11:44):    10,000 - 49,000 CFU/mL Klebsiella pneumoniae  Organism: Klebsiella pneumoniae (02-18-25 @ 11:44)  Organism: Klebsiella pneumoniae (02-18-25 @ 11:44)      Method Type: MARIELENA      -  Amoxicillin/Clavulanic Acid: S <=8/4      -  Ampicillin: R >16 These ampicillin results predict results for amoxicillin      -  Ampicillin/Sulbactam: S <=4/2      -  Aztreonam: S <=4      -  Cefazolin: S <=2 For uncomplicated UTI with K. pneumoniae, E. coli, or P. mirablis: MARIELENA <=16 is sensitive and MARIELENA >=32 is resistant. This also predicts results for oral agents cefaclor, cefdinir, cefpodoxime, cefprozil, cefuroxime axetil, cephalexin and locarbef for uncomplicated UTI. Note that some isolates may be susceptible to these agents while testing resistant to cefazolin.      -  Cefepime: S <=2      -  Cefoxitin: S <=8      -  Ceftriaxone: S <=1      -  Cefuroxime: S <=4      -  Ciprofloxacin: S <=0.25      -  Ertapenem: S <=0.5      -  Gentamicin: S <=2      -  Imipenem: S <=1      -  Levofloxacin: S <=0.5      -  Meropenem: S <=1      -  Nitrofurantoin: I 64 Should not be used to treat pyelonephritis      -  Piperacillin/Tazobactam: S <=8      -  Tobramycin: S <=2      -  Trimethoprim/Sulfamethoxazole: S <=0.5/9.5    Culture - Blood (collected 02-16-25 @ 09:35)  Source: .Blood BLOOD  Final Report (02-21-25 @ 15:01):    No growth at 5 days    Culture - Blood (collected 02-16-25 @ 09:25)  Source: .Blood BLOOD  Final Report (02-21-25 @ 15:01):    No growth at 5 days    Radiology: reviewed     Medications:  albuterol/ipratropium for Nebulization 3 milliLiter(s) Nebulizer every 6 hours  atorvastatin 20 milliGRAM(s) Oral at bedtime  buMETAnide Infusion 2 mG/Hr IV Continuous <Continuous>  chlorhexidine 0.12% Liquid 15 milliLiter(s) Oral Mucosa every 12 hours  cholecalciferol 1000 Unit(s) Oral daily  dexAMETHasone  Injectable 6 milliGRAM(s) IV Push daily  escitalopram 15 milliGRAM(s) Oral with breakfast  gabapentin Solution 150 milliGRAM(s) Oral every 12 hours  heparin  Infusion.  Unit(s)/Hr IV Continuous <Continuous>  influenza  Vaccine (HIGH DOSE) 0.5 milliLiter(s) IntraMuscular once  lacosamide IVPB 200 milliGRAM(s) IV Intermittent every 12 hours  lamoTRIgine 25 milliGRAM(s) Oral two times a day  levETIRAcetam  IVPB 1000 milliGRAM(s) IV Intermittent <User Schedule>  lurasidone 40 milliGRAM(s) Oral at bedtime  naloxegol 12.5 milliGRAM(s) Oral daily  pantoprazole   Suspension 40 milliGRAM(s) Oral daily  polyethylene glycol 3350 17 Gram(s) Oral daily  propofol Infusion 10 MICROgram(s)/kG/Min IV Continuous <Continuous>  senna 2 Tablet(s) Oral at bedtime    Prior/Completed Antimicrobials:  piperacillin/tazobactam IVPB..  piperacillin/tazobactam IVPB...  remdesivir  IVPB  remdesivir  IVPB  remdesivir  IVPB  vancomycin  IVPB.

## 2025-02-26 NOTE — PROGRESS NOTE ADULT - SUBJECTIVE AND OBJECTIVE BOX
[facility] HEMATOLOGY/ONCOLOGY INPATIENT PROGRESS NOTE     Interval Hx:   02-26-25: Mr. Gr was seen at bedside today.    Meds:   MEDICATIONS  (STANDING):  albuterol/ipratropium for Nebulization 3 milliLiter(s) Nebulizer every 6 hours  atorvastatin 20 milliGRAM(s) Oral at bedtime  buMETAnide Infusion 1 mG/Hr (5 mL/Hr) IV Continuous <Continuous>  chlorhexidine 0.12% Liquid 15 milliLiter(s) Oral Mucosa every 12 hours  cholecalciferol 1000 Unit(s) Oral daily  dexAMETHasone  Injectable 6 milliGRAM(s) IV Push daily  escitalopram 15 milliGRAM(s) Oral with breakfast  gabapentin Solution 150 milliGRAM(s) Oral every 12 hours  heparin  Infusion.  Unit(s)/Hr (17 mL/Hr) IV Continuous <Continuous>  influenza  Vaccine (HIGH DOSE) 0.5 milliLiter(s) IntraMuscular once  lacosamide IVPB 200 milliGRAM(s) IV Intermittent every 12 hours  lamoTRIgine 25 milliGRAM(s) Oral two times a day  levETIRAcetam  IVPB 1000 milliGRAM(s) IV Intermittent <User Schedule>  lurasidone 40 milliGRAM(s) Oral at bedtime  naloxegol 12.5 milliGRAM(s) Oral daily  pantoprazole   Suspension 40 milliGRAM(s) Oral daily  piperacillin/tazobactam IVPB.. 3.375 Gram(s) IV Intermittent every 8 hours  polyethylene glycol 3350 17 Gram(s) Oral daily  potassium chloride   Solution 20 milliEquivalent(s) Oral once  propofol Infusion 10 MICROgram(s)/kG/Min (5.62 mL/Hr) IV Continuous <Continuous>  senna 2 Tablet(s) Oral at bedtime    MEDICATIONS  (PRN):    Vital Signs Last 24 Hrs  T(C): 37.6 (26 Feb 2025 04:00), Max: 37.8 (26 Feb 2025 00:00)  T(F): 99.7 (26 Feb 2025 04:00), Max: 100 (26 Feb 2025 00:00)  HR: 92 (26 Feb 2025 04:00) (21 - 107)  BP: 123/64 (26 Feb 2025 04:00) (111/55 - 162/74)  BP(mean): 88 (26 Feb 2025 04:00) (77 - 111)  RR: 24 (26 Feb 2025 04:00) (20 - 30)  SpO2: 88% (26 Feb 2025 04:00) (88% - 100%)    Parameters below as of 26 Feb 2025 00:00  Patient On (Oxygen Delivery Method): ventilator    O2 Concentration (%): 40    Physical Exam:  Gen: Intubated  HEENT: EOMI, MMM  Chest: equal chest rise  Cardiac: regular   Abd: non distended   Neuro: sedated     Labs:                        7.3    7.55  )-----------( 103      ( 26 Feb 2025 00:17 )             23.2     CBC Full  -  ( 26 Feb 2025 00:17 )  WBC Count : 7.55 K/uL  RBC Count : 2.34 M/uL  Hemoglobin : 7.3 g/dL  Hematocrit : 23.2 %  Platelet Count - Automated : 103 K/uL  Mean Cell Volume : 99.1 fl  Mean Cell Hemoglobin : 31.2 pg  Mean Cell Hemoglobin Concentration : 31.5 g/dL    02-26    140  |  102  |  75[H]  ----------------------------<  142[H]  3.8   |  21[L]  |  4.01[H]    Ca    9.5      26 Feb 2025 00:17  Phos  4.8     02-26  Mg     2.1     02-26    TPro  6.7  /  Alb  2.7[L]  /  TBili  0.4  /  DBili  x   /  AST  41[H]  /  ALT  39  /  AlkPhos  129[H]  02-25    PT/INR - ( 26 Feb 2025 00:17 )   PT: 12.0 sec;   INR: 1.04 ratio         PTT - ( 26 Feb 2025 00:17 )  PTT:66.7 sec  Bilirubin Total: 0.4 mg/dL (02-25-25 @ 15:50)   [facility] HEMATOLOGY/ONCOLOGY INPATIENT PROGRESS NOTE     Interval Hx:   02-26-25: Mr. Gr was seen at bedside today, continues in MICU, intubated, without signs of bleeding, continues on heparin drip     Meds:   MEDICATIONS  (STANDING):  albuterol/ipratropium for Nebulization 3 milliLiter(s) Nebulizer every 6 hours  atorvastatin 20 milliGRAM(s) Oral at bedtime  buMETAnide Infusion 1 mG/Hr (5 mL/Hr) IV Continuous <Continuous>  chlorhexidine 0.12% Liquid 15 milliLiter(s) Oral Mucosa every 12 hours  cholecalciferol 1000 Unit(s) Oral daily  dexAMETHasone  Injectable 6 milliGRAM(s) IV Push daily  escitalopram 15 milliGRAM(s) Oral with breakfast  gabapentin Solution 150 milliGRAM(s) Oral every 12 hours  heparin  Infusion.  Unit(s)/Hr (17 mL/Hr) IV Continuous <Continuous>  influenza  Vaccine (HIGH DOSE) 0.5 milliLiter(s) IntraMuscular once  lacosamide IVPB 200 milliGRAM(s) IV Intermittent every 12 hours  lamoTRIgine 25 milliGRAM(s) Oral two times a day  levETIRAcetam  IVPB 1000 milliGRAM(s) IV Intermittent <User Schedule>  lurasidone 40 milliGRAM(s) Oral at bedtime  naloxegol 12.5 milliGRAM(s) Oral daily  pantoprazole   Suspension 40 milliGRAM(s) Oral daily  piperacillin/tazobactam IVPB.. 3.375 Gram(s) IV Intermittent every 8 hours  polyethylene glycol 3350 17 Gram(s) Oral daily  potassium chloride   Solution 20 milliEquivalent(s) Oral once  propofol Infusion 10 MICROgram(s)/kG/Min (5.62 mL/Hr) IV Continuous <Continuous>  senna 2 Tablet(s) Oral at bedtime    MEDICATIONS  (PRN):    Vital Signs Last 24 Hrs  T(C): 37.6 (26 Feb 2025 04:00), Max: 37.8 (26 Feb 2025 00:00)  T(F): 99.7 (26 Feb 2025 04:00), Max: 100 (26 Feb 2025 00:00)  HR: 92 (26 Feb 2025 04:00) (21 - 107)  BP: 123/64 (26 Feb 2025 04:00) (111/55 - 162/74)  BP(mean): 88 (26 Feb 2025 04:00) (77 - 111)  RR: 24 (26 Feb 2025 04:00) (20 - 30)  SpO2: 88% (26 Feb 2025 04:00) (88% - 100%)    Parameters below as of 26 Feb 2025 00:00  Patient On (Oxygen Delivery Method): ventilator    O2 Concentration (%): 40    Physical Exam:  Gen: Intubated  HEENT: EOMI, MMM  Chest: equal chest rise  Cardiac: regular   Abd: non distended   Neuro: sedated     Labs:                        7.3    7.55  )-----------( 103      ( 26 Feb 2025 00:17 )             23.2     CBC Full  -  ( 26 Feb 2025 00:17 )  WBC Count : 7.55 K/uL  RBC Count : 2.34 M/uL  Hemoglobin : 7.3 g/dL  Hematocrit : 23.2 %  Platelet Count - Automated : 103 K/uL  Mean Cell Volume : 99.1 fl  Mean Cell Hemoglobin : 31.2 pg  Mean Cell Hemoglobin Concentration : 31.5 g/dL    02-26    140  |  102  |  75[H]  ----------------------------<  142[H]  3.8   |  21[L]  |  4.01[H]    Ca    9.5      26 Feb 2025 00:17  Phos  4.8     02-26  Mg     2.1     02-26    TPro  6.7  /  Alb  2.7[L]  /  TBili  0.4  /  DBili  x   /  AST  41[H]  /  ALT  39  /  AlkPhos  129[H]  02-25    PT/INR - ( 26 Feb 2025 00:17 )   PT: 12.0 sec;   INR: 1.04 ratio         PTT - ( 26 Feb 2025 00:17 )  PTT:66.7 sec  Bilirubin Total: 0.4 mg/dL (02-25-25 @ 15:50)

## 2025-02-26 NOTE — PROGRESS NOTE ADULT - SUBJECTIVE AND OBJECTIVE BOX
[facility]   [phone]  RENAL FOLLOW UP NOTE  --------------------------------------------------------------------------------  Chief Complaint:    24 hour events/subjective:    seen earlier   intubated     PAST HISTORY  --------------------------------------------------------------------------------  No significant changes to PMH, PSH, FHx, SHx, unless otherwise noted    ALLERGIES & MEDICATIONS  --------------------------------------------------------------------------------  Allergies    seasonal allergies (Unknown)  penicillin (Hives)    Intolerances    latex (Rash)    Standing Inpatient Medications  albuterol/ipratropium for Nebulization 3 milliLiter(s) Nebulizer every 6 hours  atorvastatin 20 milliGRAM(s) Oral at bedtime  buMETAnide Infusion 2 mG/Hr IV Continuous <Continuous>  chlorhexidine 0.12% Liquid 15 milliLiter(s) Oral Mucosa every 12 hours  cholecalciferol 1000 Unit(s) Oral daily  dexAMETHasone  Injectable 6 milliGRAM(s) IV Push daily  escitalopram 15 milliGRAM(s) Oral with breakfast  gabapentin Solution 150 milliGRAM(s) Oral every 12 hours  heparin  Infusion.  Unit(s)/Hr IV Continuous <Continuous>  influenza  Vaccine (HIGH DOSE) 0.5 milliLiter(s) IntraMuscular once  lacosamide IVPB 200 milliGRAM(s) IV Intermittent every 12 hours  lamoTRIgine 25 milliGRAM(s) Oral two times a day  levETIRAcetam  IVPB 1000 milliGRAM(s) IV Intermittent <User Schedule>  lurasidone 40 milliGRAM(s) Oral at bedtime  naloxegol 12.5 milliGRAM(s) Oral daily  pantoprazole   Suspension 40 milliGRAM(s) Oral daily  piperacillin/tazobactam IVPB.. 3.375 Gram(s) IV Intermittent every 8 hours  polyethylene glycol 3350 17 Gram(s) Oral daily  propofol Infusion 10 MICROgram(s)/kG/Min IV Continuous <Continuous>  senna 2 Tablet(s) Oral at bedtime    PRN Inpatient Medications      REVIEW OF SYSTEMS  --------------------------------------------------------------------------------      VITALS/PHYSICAL EXAM  --------------------------------------------------------------------------------  T(C): 37.6 (02-26-25 @ 20:00), Max: 37.8 (02-26-25 @ 00:00)  HR: 91 (02-26-25 @ 20:30) (86 - 108)  BP: 115/59 (02-26-25 @ 20:00) (88/53 - 159/80)  RR: 21 (02-26-25 @ 20:00) (17 - 31)  SpO2: 97% (02-26-25 @ 20:30) (87% - 100%)  Wt(kg): --        02-25-25 @ 07:01  -  02-26-25 @ 07:00  --------------------------------------------------------  IN: 3823.8 mL / OUT: 6235 mL / NET: -2411.2 mL    02-26-25 @ 07:01  -  02-26-25 @ 21:44  --------------------------------------------------------  IN: 1718.8 mL / OUT: 1975 mL / NET: -256.2 mL      Physical Exam:  	    Gen: ill appearing male, intubated   	Pulm: decrease bs, +coarse   	CV: No JVD. RRR, S1S2; no rub  	Abd: +BS, soft, nondistended  	: No suprapubic tenderness, external catheter  	UE: Warm, no cyanosis  no clubbing,  no edema;  	LE: Warm, no cyanosis  no clubbing, no edema      LABS/STUDIES  --------------------------------------------------------------------------------              7.3    7.55  >-----------<  103      [02-26-25 @ 00:17]              23.2     141  |  98  |  81  ----------------------------<  187      [02-26-25 @ 16:13]  4.2   |  22  |  3.95        Ca     9.6     [02-26-25 @ 16:13]      Mg     2.1     [02-26-25 @ 16:13]      Phos  5.4     [02-26-25 @ 16:13]    TPro  6.7  /  Alb  2.7  /  TBili  0.4  /  DBili  x   /  AST  41  /  ALT  39  /  AlkPhos  129  [02-25-25 @ 15:50]    PT/INR: PT 12.0 , INR 1.04       [02-26-25 @ 00:17]  PTT: 66.7       [02-26-25 @ 00:17]      Creatinine Trend:  SCr 3.95 [02-26 @ 16:13]  SCr 4.03 [02-26 @ 07:53]  SCr 4.01 [02-26 @ 00:17]  SCr 4.14 [02-25 @ 15:50]  SCr 4.24 [02-25 @ 00:59]              Ferritin [id number]      [02-23-25 @ 06:15]  TSH 0.87      [01-25-25 @ 11:31]

## 2025-02-26 NOTE — PROGRESS NOTE ADULT - ASSESSMENT
Patient is a 69 year old male with PMH of HTN, HLD, VAZQUEZ (on CPAP at bedtime, NC 2 liters during the day), CKD3, epilepsy, schizophrenia, developmental delay, metastatic testicular cancer (s/p orchiectomy, actively on chemotherapy, last dose of etoposide/cisplatin on 6/2024) presenting with worsening shortness of breath. Patient was recently hospitalized at [facility] from January 27th 2025 to February 6th 2025 for seizure and influenza virus infection, which required intubation. He was sent to rehab (originally from home) from hospital and now returns due to sudden onset shortness of breath and worsening oxygenation. Of note, he tested positive for COVID-19 at facility on 2/13/25 and was not put on any COVID-19 treatment at the time, only guaifenesin and scopolamine patch. Patient was placed on non-rebreather and sent to the hospital on 2/16/25.     Acute on chronic hypoxic respiratory failure  COVID-19 pneumonia, superimposed bacterial pneumonia  Acute on chronic HFpEF  SHAGUFTA on CKD  Course c/b severe sepsis, hypotension, SHAGUFTA, likely due to aspiration pneumonia   - noted initial discussion with patient/family and decided to hold off on remdesivir given LFTs and SHAGUFTA  - CT chest with extensive b/l ggo majority new since 1/30/25-c/w COVID pneumonia; tracheomalacia   - MRSA PCR screen negative, s/p vancomycin   - Ucx with Klebsiella, pt with no gu symptoms but covered by zosyn   - 2/16 Bcx negative   - 2/17 Scx with normal resp lucia   - 2/18 had worsening resp status on NC requiring AVAPS, started on remdesivir   - 2/19 overnight fever, currently on HFNC, comfortable, WBC remains wnl, UA negative   - exam with no new focal findings, suspect fevers d/t COVID pneumonia   - 2/21 afebrile >24h, remains on HFNC/AVAPS, WBC wnl  - 2/22 completed remdesivir x5d course   - 2/23 s/p RRT for inc WOB, intubated and transferred to MICU, suspected aspiration   - 2/24 remains intubated, on sedation, pressor support, SHAGUFTA, WBC wnl   - 2/25 afebrile, off pressor, WBC wnl, Cr stable    Recommendations:  Continue zosyn 3.375g IV Q8h (renally dosed) until 2/28  Pulmonary following, on dexamethasone   Nephrology following, monitor Cr   Diuresis as tolerated   Monitor temps/WBC  Supportive care  Aspiration precautions  Continue rest of care per primary team       Greyson Mart M.D.  [facility]  Available on [employer] TEAMS - *PREFERRED*  [phone]  After 5pm on weekdays and all day on weekends - please call [phone]     Thank you for consulting us and involving us in the management of this patients case. In addition to reviewing history, imaging, documents, labs, microbiology, took into account antibiotic stewardship, local antibiogram and infection control strategies and potential transmission issues.

## 2025-02-26 NOTE — CHART NOTE - NSCHARTNOTEFT_GEN_A_CORE
: Edmund Acosta    INDICATION: respiratory failure    PROCEDURE:  [x ] LIMITED ECHO  [x ] LIMITED CHEST  [ ] LIMITED RETROPERITONEAL  [ ] LIMITED ABDOMINAL  [ ] LIMITED DVT  [ ] NEEDLE GUIDANCE VASCULAR  [ ] NEEDLE GUIDANCE THORACENTESIS  [ ] NEEDLE GUIDANCE PARACENTESIS  [ ] NEEDLE GUIDANCE PERICARDIOCENTESIS  [ ] OTHER    FINDINGS:  Multiple areas of irregular pleural surface w/ scattered B-lines, though A line pattern predominates  Grossly normal LV systolic function, LV > RV, LA dilated, no pericardial effusion     INTERPRETATION:  Irregular pleural surface w/ mostly normal aeration pattern; f/u CT chest  No cardiac limitation on limited exam

## 2025-02-27 LAB
ADD ON TEST-SPECIMEN IN LAB: SIGNIFICANT CHANGE UP
ALBUMIN SERPL ELPH-MCNC: 2.6 G/DL — LOW (ref 3.3–5)
ALP SERPL-CCNC: 153 U/L — HIGH (ref 40–120)
ALT FLD-CCNC: 46 U/L — HIGH (ref 10–45)
ANION GAP SERPL CALC-SCNC: 21 MMOL/L — HIGH (ref 5–17)
ANION GAP SERPL CALC-SCNC: 22 MMOL/L — HIGH (ref 5–17)
APTT BLD: 62.4 SEC — HIGH (ref 24.5–35.6)
AST SERPL-CCNC: 50 U/L — HIGH (ref 10–40)
BASOPHILS # BLD AUTO: 0.01 K/UL — SIGNIFICANT CHANGE UP (ref 0–0.2)
BASOPHILS NFR BLD AUTO: 0.2 % — SIGNIFICANT CHANGE UP (ref 0–2)
BILIRUB SERPL-MCNC: 0.3 MG/DL — SIGNIFICANT CHANGE UP (ref 0.2–1.2)
BUN SERPL-MCNC: 87 MG/DL — HIGH (ref 7–23)
BUN SERPL-MCNC: 92 MG/DL — HIGH (ref 7–23)
CALCIUM SERPL-MCNC: 9.2 MG/DL — SIGNIFICANT CHANGE UP (ref 8.4–10.5)
CALCIUM SERPL-MCNC: 9.2 MG/DL — SIGNIFICANT CHANGE UP (ref 8.4–10.5)
CHLORIDE SERPL-SCNC: 95 MMOL/L — LOW (ref 96–108)
CHLORIDE SERPL-SCNC: 98 MMOL/L — SIGNIFICANT CHANGE UP (ref 96–108)
CO2 SERPL-SCNC: 22 MMOL/L — SIGNIFICANT CHANGE UP (ref 22–31)
CO2 SERPL-SCNC: 24 MMOL/L — SIGNIFICANT CHANGE UP (ref 22–31)
CREAT SERPL-MCNC: 4.03 MG/DL — HIGH (ref 0.5–1.3)
CREAT SERPL-MCNC: 4.08 MG/DL — HIGH (ref 0.5–1.3)
CULTURE RESULTS: SIGNIFICANT CHANGE UP
CULTURE RESULTS: SIGNIFICANT CHANGE UP
EGFR: 15 ML/MIN/1.73M2 — LOW
EOSINOPHIL # BLD AUTO: 0.07 K/UL — SIGNIFICANT CHANGE UP (ref 0–0.5)
EOSINOPHIL NFR BLD AUTO: 1.1 % — SIGNIFICANT CHANGE UP (ref 0–6)
GAS PNL BLDA: SIGNIFICANT CHANGE UP
GLUCOSE SERPL-MCNC: 113 MG/DL — HIGH (ref 70–99)
GLUCOSE SERPL-MCNC: 179 MG/DL — HIGH (ref 70–99)
HCT VFR BLD CALC: 23.4 % — LOW (ref 39–50)
HGB BLD-MCNC: 7.5 G/DL — LOW (ref 13–17)
IMM GRANULOCYTES NFR BLD AUTO: 3.2 % — HIGH (ref 0–0.9)
INR BLD: 1.04 RATIO — SIGNIFICANT CHANGE UP (ref 0.85–1.16)
LYMPHOCYTES # BLD AUTO: 0.73 K/UL — LOW (ref 1–3.3)
LYMPHOCYTES # BLD AUTO: 11.2 % — LOW (ref 13–44)
MAGNESIUM SERPL-MCNC: 2.2 MG/DL — SIGNIFICANT CHANGE UP (ref 1.6–2.6)
MAGNESIUM SERPL-MCNC: 2.2 MG/DL — SIGNIFICANT CHANGE UP (ref 1.6–2.6)
MCHC RBC-ENTMCNC: 31.5 PG — SIGNIFICANT CHANGE UP (ref 27–34)
MCHC RBC-ENTMCNC: 32.1 G/DL — SIGNIFICANT CHANGE UP (ref 32–36)
MCV RBC AUTO: 98.3 FL — SIGNIFICANT CHANGE UP (ref 80–100)
MONOCYTES # BLD AUTO: 0.37 K/UL — SIGNIFICANT CHANGE UP (ref 0–0.9)
MONOCYTES NFR BLD AUTO: 5.7 % — SIGNIFICANT CHANGE UP (ref 2–14)
NEUTROPHILS # BLD AUTO: 5.11 K/UL — SIGNIFICANT CHANGE UP (ref 1.8–7.4)
NEUTROPHILS NFR BLD AUTO: 78.6 % — HIGH (ref 43–77)
NRBC BLD AUTO-RTO: 0 /100 WBCS — SIGNIFICANT CHANGE UP (ref 0–0)
PHOSPHATE SERPL-MCNC: 6.4 MG/DL — HIGH (ref 2.5–4.5)
PHOSPHATE SERPL-MCNC: 6.9 MG/DL — HIGH (ref 2.5–4.5)
PLATELET # BLD AUTO: 104 K/UL — LOW (ref 150–400)
POTASSIUM SERPL-MCNC: 3.9 MMOL/L — SIGNIFICANT CHANGE UP (ref 3.5–5.3)
POTASSIUM SERPL-MCNC: 4.3 MMOL/L — SIGNIFICANT CHANGE UP (ref 3.5–5.3)
POTASSIUM SERPL-SCNC: 3.9 MMOL/L — SIGNIFICANT CHANGE UP (ref 3.5–5.3)
POTASSIUM SERPL-SCNC: 4.3 MMOL/L — SIGNIFICANT CHANGE UP (ref 3.5–5.3)
PROT SERPL-MCNC: 6.8 G/DL — SIGNIFICANT CHANGE UP (ref 6–8.3)
PROTHROM AB SERPL-ACNC: 11.8 SEC — SIGNIFICANT CHANGE UP (ref 9.9–13.4)
RBC # BLD: 2.38 M/UL — LOW (ref 4.2–5.8)
RBC # FLD: 14.9 % — HIGH (ref 10.3–14.5)
SODIUM SERPL-SCNC: 140 MMOL/L — SIGNIFICANT CHANGE UP (ref 135–145)
SODIUM SERPL-SCNC: 142 MMOL/L — SIGNIFICANT CHANGE UP (ref 135–145)
SPECIMEN SOURCE: SIGNIFICANT CHANGE UP
SPECIMEN SOURCE: SIGNIFICANT CHANGE UP
URATE SERPL-MCNC: 8.6 MG/DL — SIGNIFICANT CHANGE UP (ref 3.4–8.8)
WBC # BLD: 6.5 K/UL — SIGNIFICANT CHANGE UP (ref 3.8–10.5)
WBC # FLD AUTO: 6.5 K/UL — SIGNIFICANT CHANGE UP (ref 3.8–10.5)

## 2025-02-27 PROCEDURE — 99291 CRITICAL CARE FIRST HOUR: CPT

## 2025-02-27 RX ORDER — BUMETANIDE 1 MG/1
4 TABLET ORAL
Qty: 20 | Refills: 0 | Status: DISCONTINUED | OUTPATIENT
Start: 2025-02-27 | End: 2025-02-28

## 2025-02-27 RX ORDER — LURASIDONE HYDROCHLORIDE 120 MG/1
40 TABLET, FILM COATED ORAL AT BEDTIME
Refills: 0 | Status: DISCONTINUED | OUTPATIENT
Start: 2025-02-27 | End: 2025-03-03

## 2025-02-27 RX ADMIN — Medication 10 MILLIEQUIVALENT(S): at 05:06

## 2025-02-27 RX ADMIN — BUMETANIDE 20 MG/HR: 1 TABLET ORAL at 17:37

## 2025-02-27 RX ADMIN — Medication 25 GRAM(S): at 22:00

## 2025-02-27 RX ADMIN — DEXAMETHASONE 6 MILLIGRAM(S): 0.5 TABLET ORAL at 05:05

## 2025-02-27 RX ADMIN — NALOXEGOL OXALATE 12.5 MILLIGRAM(S): 12.5 TABLET, FILM COATED ORAL at 11:16

## 2025-02-27 RX ADMIN — LEVETIRACETAM 400 MILLIGRAM(S): 10 INJECTION, SOLUTION INTRAVENOUS at 13:15

## 2025-02-27 RX ADMIN — LAMOTRIGINE 25 MILLIGRAM(S): 150 TABLET ORAL at 05:07

## 2025-02-27 RX ADMIN — Medication 15 MILLILITER(S): at 05:07

## 2025-02-27 RX ADMIN — IPRATROPIUM BROMIDE AND ALBUTEROL SULFATE 3 MILLILITER(S): .5; 2.5 SOLUTION RESPIRATORY (INHALATION) at 17:17

## 2025-02-27 RX ADMIN — PROPOFOL 5.62 MICROGRAM(S)/KG/MIN: 10 INJECTION, EMULSION INTRAVENOUS at 11:16

## 2025-02-27 RX ADMIN — Medication 40 MILLIGRAM(S): at 11:17

## 2025-02-27 RX ADMIN — LACOSAMIDE 140 MILLIGRAM(S): 150 TABLET, FILM COATED ORAL at 17:47

## 2025-02-27 RX ADMIN — Medication 25 GRAM(S): at 13:39

## 2025-02-27 RX ADMIN — Medication 25 GRAM(S): at 05:06

## 2025-02-27 RX ADMIN — ATORVASTATIN CALCIUM 20 MILLIGRAM(S): 80 TABLET, FILM COATED ORAL at 22:01

## 2025-02-27 RX ADMIN — GABAPENTIN 150 MILLIGRAM(S): 400 CAPSULE ORAL at 05:05

## 2025-02-27 RX ADMIN — LEVETIRACETAM 400 MILLIGRAM(S): 10 INJECTION, SOLUTION INTRAVENOUS at 22:10

## 2025-02-27 RX ADMIN — GABAPENTIN 150 MILLIGRAM(S): 400 CAPSULE ORAL at 17:04

## 2025-02-27 RX ADMIN — LAMOTRIGINE 25 MILLIGRAM(S): 150 TABLET ORAL at 17:04

## 2025-02-27 RX ADMIN — PROPOFOL 5.62 MICROGRAM(S)/KG/MIN: 10 INJECTION, EMULSION INTRAVENOUS at 19:08

## 2025-02-27 RX ADMIN — IPRATROPIUM BROMIDE AND ALBUTEROL SULFATE 3 MILLILITER(S): .5; 2.5 SOLUTION RESPIRATORY (INHALATION) at 11:26

## 2025-02-27 RX ADMIN — Medication 1000 UNIT(S): at 11:17

## 2025-02-27 RX ADMIN — LURASIDONE HYDROCHLORIDE 40 MILLIGRAM(S): 120 TABLET, FILM COATED ORAL at 23:32

## 2025-02-27 RX ADMIN — IPRATROPIUM BROMIDE AND ALBUTEROL SULFATE 3 MILLILITER(S): .5; 2.5 SOLUTION RESPIRATORY (INHALATION) at 05:12

## 2025-02-27 RX ADMIN — ESCITALOPRAM OXALATE 15 MILLIGRAM(S): 20 TABLET ORAL at 08:45

## 2025-02-27 RX ADMIN — IPRATROPIUM BROMIDE AND ALBUTEROL SULFATE 3 MILLILITER(S): .5; 2.5 SOLUTION RESPIRATORY (INHALATION) at 23:15

## 2025-02-27 RX ADMIN — Medication 15 MILLILITER(S): at 17:05

## 2025-02-27 RX ADMIN — BUMETANIDE 10 MG/HR: 1 TABLET ORAL at 11:17

## 2025-02-27 RX ADMIN — LACOSAMIDE 140 MILLIGRAM(S): 150 TABLET, FILM COATED ORAL at 05:06

## 2025-02-27 RX ADMIN — LEVETIRACETAM 400 MILLIGRAM(S): 10 INJECTION, SOLUTION INTRAVENOUS at 05:07

## 2025-02-27 NOTE — PROGRESS NOTE ADULT - PROBLEM SELECTOR PLAN 1
?superimposed PNA on top of COVID-19?  - Zosyn 4.5 g q12h, renally dosed  - Vancomycin discontinued  - Decadron 6mg IVPB daily x 10 days for COVID-19   - completed Remdesivir IV   - Maintain O2 sats above 92%  - BiPaP daytime prn   - BiPaP at bedtime   - Blood cultures x 2 (-) so far  - MRSA swab negative  - Sputum culture --> few gram variable rods and rare G (+) cocci in pairs  - Legionella Ur Ag (-)  - Streptococcus pneumoniae testing  - Appreciate pulmonology  - bumex gtt per ICU team, monitor urine output  - repeat CT chest reviewed: upper lobe groundglass opacities have improved, there are new and worsening confluent areas of consolidation/pneumonia in the mid to lower lungs  - ID follow up.

## 2025-02-27 NOTE — PROGRESS NOTE ADULT - SUBJECTIVE AND OBJECTIVE BOX
[facility]  JACINTO Horton Y. Patel, S. Shah, G. Casimir  [phone]  ([phone] - weekdays after 5pm and weekends)    Name: OSCAR MILAN  Age/Gender: 69y Male  MRN: [id number]    Interval History:  Patient seen and examined this morning.   Remains intubated, unable to obtain ROS.   Notes reviewed  No concerning overnight events  Afebrile   Allergies: seasonal allergies (Unknown)  penicillin (Hives)      Objective:  Vitals:   T(F): 99.9 (02-27-25 @ 04:00), Max: 100 (02-26-25 @ 08:00)  HR: 92 (02-27-25 @ 07:00) (86 - 127)  BP: 110/59 (02-27-25 @ 07:00) (70/48 - 159/80)  RR: 26 (02-27-25 @ 07:00) (16 - 31)  SpO2: 91% (02-27-25 @ 07:00) (87% - 100%)  Physical Examination:  General: no acute distress  HEENT: normocephalic, atraumatic, ETT  Respiratory: clear to auscultation anteriorly   Cardiovascular: S1S2 present, normal rate   Gastrointestinal: obese, soft, nondistended  Extremities: no LE edema, no cyanosis  Skin: no visible rash    Laboratory Studies:  CBC:                       7.5    6.50  )-----------( 104      ( 27 Feb 2025 00:24 )             23.4     WBC Trend:  6.50 02-27-25 @ 00:24  7.55 02-26-25 @ 00:17  9.17 02-25-25 @ 00:59  8.76 02-23-25 @ 22:48  10.78 02-23-25 @ 16:19  9.74 02-23-25 @ 06:14  8.59 02-22-25 @ 06:08  7.85 02-21-25 @ 19:44  8.68 02-21-25 @ 06:30    CMP: 02-27    142  |  98  |  87[H]  ----------------------------<  113[H]  3.9   |  22  |  4.08[H]    Ca    9.2      27 Feb 2025 00:24  Phos  6.9     02-27  Mg     2.2     02-27    TPro  6.8  /  Alb  2.6[L]  /  TBili  0.3  /  DBili  x   /  AST  50[H]  /  ALT  46[H]  /  AlkPhos  153[H]  02-27    Creatinine: 4.08 mg/dL (02-27-25 @ 00:24)  Creatinine: 3.95 mg/dL (02-26-25 @ 16:13)  Creatinine: 4.03 mg/dL (02-26-25 @ 07:53)  Creatinine: 4.01 mg/dL (02-26-25 @ 00:17)  Creatinine: 4.14 mg/dL (02-25-25 @ 15:50)  Creatinine: 4.24 mg/dL (02-25-25 @ 00:59)  Creatinine: 4.24 mg/dL (02-24-25 @ 15:24)  Creatinine: 4.13 mg/dL (02-24-25 @ 11:38)  Creatinine: 3.65 mg/dL (02-23-25 @ 22:48)  Creatinine: 2.64 mg/dL (02-23-25 @ 06:11)  Creatinine: 2.47 mg/dL (02-22-25 @ 06:08)  Creatinine: 2.54 mg/dL (02-21-25 @ 19:36)  Creatinine: 2.44 mg/dL (02-21-25 @ 14:13)  Creatinine: 0.68 mg/dL (02-21-25 @ 01:02)    LIVER FUNCTIONS - ( 27 Feb 2025 00:24 )  Alb: 2.6 g/dL / Pro: 6.8 g/dL / ALK PHOS: 153 U/L / ALT: 46 U/L / AST: 50 U/L / GGT: x           Microbiology: reviewed   Culture - Blood (collected 02-21-25 @ 19:36)  Source: .Blood Blood  Final Report (02-27-25 @ 02:01):    No growth at 5 days    Culture - Blood (collected 02-21-25 @ 19:35)  Source: .Blood Blood  Final Report (02-27-25 @ 02:01):    No growth at 5 days    Culture - Blood (collected 02-19-25 @ 00:18)  Source: .Blood Blood-Peripheral  Final Report (02-24-25 @ 03:01):    No growth at 5 days    Culture - Blood (collected 02-19-25 @ 00:18)  Source: .Blood Blood-Peripheral  Final Report (02-24-25 @ 03:01):    No growth at 5 days    Culture - Sputum (collected 02-17-25 @ 22:23)  Source: .Sputum Sputum  Gram Stain (02-18-25 @ 06:21):    Few polymorphonuclear leukocytes per low power field    No Squamous epithelial cells per low power field    Few Gram Variable Rods seen per oil power field    Rare Gram positive cocci in pairs seen per oil power field  Final Report (02-19-25 @ 16:20):    Commensal lucia consistent with body site    Culture - Urine (collected 02-16-25 @ 11:47)  Source: Clean Catch Clean Catch (Midstream)  Final Report (02-18-25 @ 11:44):    10,000 - 49,000 CFU/mL Klebsiella pneumoniae  Organism: Klebsiella pneumoniae (02-18-25 @ 11:44)  Organism: Klebsiella pneumoniae (02-18-25 @ 11:44)      Method Type: MARIELENA      -  Amoxicillin/Clavulanic Acid: S <=8/4      -  Ampicillin: R >16 These ampicillin results predict results for amoxicillin      -  Ampicillin/Sulbactam: S <=4/2      -  Aztreonam: S <=4      -  Cefazolin: S <=2 For uncomplicated UTI with K. pneumoniae, E. coli, or P. mirablis: MARIELENA <=16 is sensitive and MARIELENA >=32 is resistant. This also predicts results for oral agents cefaclor, cefdinir, cefpodoxime, cefprozil, cefuroxime axetil, cephalexin and locarbef for uncomplicated UTI. Note that some isolates may be susceptible to these agents while testing resistant to cefazolin.      -  Cefepime: S <=2      -  Cefoxitin: S <=8      -  Ceftriaxone: S <=1      -  Cefuroxime: S <=4      -  Ciprofloxacin: S <=0.25      -  Ertapenem: S <=0.5      -  Gentamicin: S <=2      -  Imipenem: S <=1      -  Levofloxacin: S <=0.5      -  Meropenem: S <=1      -  Nitrofurantoin: I 64 Should not be used to treat pyelonephritis      -  Piperacillin/Tazobactam: S <=8      -  Tobramycin: S <=2      -  Trimethoprim/Sulfamethoxazole: S <=0.5/9.5    Culture - Blood (collected 02-16-25 @ 09:35)  Source: .Blood BLOOD  Final Report (02-21-25 @ 15:01):    No growth at 5 days    Culture - Blood (collected 02-16-25 @ 09:25)  Source: .Blood BLOOD  Final Report (02-21-25 @ 15:01):    No growth at 5 days    Radiology: reviewed   < from: Xray Chest 1 View- PORTABLE-Urgent (Xray Chest 1 View- PORTABLE-Urgent .) (02.26.25 @ 11:26) >  IMPRESSION:  Improved aeration of the bilateral upper lobes.  Bibasilar hazy opacities.    --- End of Report ---    < end of copied text >    Medications:  albuterol/ipratropium for Nebulization 3 milliLiter(s) Nebulizer every 6 hours  atorvastatin 20 milliGRAM(s) Oral at bedtime  buMETAnide Infusion 2 mG/Hr IV Continuous <Continuous>  chlorhexidine 0.12% Liquid 15 milliLiter(s) Oral Mucosa every 12 hours  cholecalciferol 1000 Unit(s) Oral daily  escitalopram 15 milliGRAM(s) Oral with breakfast  gabapentin Solution 150 milliGRAM(s) Oral every 12 hours  heparin  Infusion.  Unit(s)/Hr IV Continuous <Continuous>  influenza  Vaccine (HIGH DOSE) 0.5 milliLiter(s) IntraMuscular once  lacosamide IVPB 200 milliGRAM(s) IV Intermittent every 12 hours  lamoTRIgine 25 milliGRAM(s) Oral two times a day  levETIRAcetam  IVPB 1000 milliGRAM(s) IV Intermittent <User Schedule>  lurasidone 40 milliGRAM(s) Oral at bedtime  naloxegol 12.5 milliGRAM(s) Oral daily  pantoprazole   Suspension 40 milliGRAM(s) Oral daily  piperacillin/tazobactam IVPB.. 3.375 Gram(s) IV Intermittent every 8 hours  polyethylene glycol 3350 17 Gram(s) Oral daily  propofol Infusion 10 MICROgram(s)/kG/Min IV Continuous <Continuous>  senna 2 Tablet(s) Oral at bedtime    Current Antimicrobials:  piperacillin/tazobactam IVPB.. 3.375 Gram(s) IV Intermittent every 8 hours    Prior/Completed Antimicrobials:  piperacillin/tazobactam IVPB..  piperacillin/tazobactam IVPB...  remdesivir  IVPB  remdesivir  IVPB  remdesivir  IVPB  vancomycin  IVPB.

## 2025-02-27 NOTE — CHART NOTE - NSCHARTNOTEFT_GEN_A_CORE
NUTRITION FOLLOW UP NOTE    PATIENT SEEN FOR: malnutrition follow up.    SOURCE: [] Patient  [x] Current Medical Record  [x] RN  [] Family/support person at bedside  [x] Patient unavailable/inappropriate  [x] Other: interdisciplinary team     CHART REVIEWED/EVENTS NOTED.  [] No changes to nutrition care plan to note  [x] Nutrition Status:  - Pt remains intubated, sedated on Propofol (currently @16.8mL/hr, x 24hr provides 444kcal)  - SHAGUFTA    DIET ORDER:   Diet, NPO with Tube Feed:   Tube Feeding Modality: Orogastric  Vital AF (VITALAFRTH)  Total Volume for 24 Hours (mL): 1080  Intermittent  Starting Tube Feed Rate {mL per Hour}: 10  Increase Tube Feed Rate by (mL): 10    Every 4 hours  Until Goal Tube Feed Rate (mL per Hour): 60  Tube Feeding Hours ON: 18  Tube Feeding OFF (Hours): 6  Tube Feed Start Time: 11:00  Free Water Flush  Bolus   Total Volume per Flush (mL): 200   Frequency: Every 6 Hours (25)    CURRENT DIET ORDER IS:  [] Appropriate:  [] Inadequate:  [x] Other: See recommendations below.    NUTRITION INTAKE/PROVISION:  [] PO:  [x] Enteral Nutrition: ENTERAL NUTRITION  EN Order Provides:  1080 mL formula, 1296 kcal, 81 g protein, 876 mL free water.  Propofol providing between 296 - 444kcal over past 5 days  Current Pump Rate: off per 18hr order  EN provision:   - : 660mL  - : 960mL  - : 760mL  - : 500mL (Jevity 1.5)  [] Parenteral Nutrition:    ANTHROPOMETRICS:  Drug Dosing Weight  Height (cm): 167.6 (2025 09:10)  Weight (kg): 93.6 (2025 11:00)  BMI (kg/m2): 33.3 (2025 11:00)  BSA (m2): 2.03 (2025 11:00)    Weights:   Daily Weight in k.8 (), Weight in k.3 (), Weight in k.9 ()   - patient being diuresed, no new weights since previous assessment     MEDICATIONS:  MEDICATIONS  (STANDING):  albuterol/ipratropium for Nebulization 3 milliLiter(s) Nebulizer every 6 hours  atorvastatin 20 milliGRAM(s) Oral at bedtime  buMETAnide Infusion 2 mG/Hr (10 mL/Hr) IV Continuous <Continuous>  chlorhexidine 0.12% Liquid 15 milliLiter(s) Oral Mucosa every 12 hours  cholecalciferol 1000 Unit(s) Oral daily  escitalopram 15 milliGRAM(s) Oral with breakfast  gabapentin Solution 150 milliGRAM(s) Oral every 12 hours  heparin  Infusion.  Unit(s)/Hr (17 mL/Hr) IV Continuous <Continuous>  influenza  Vaccine (HIGH DOSE) 0.5 milliLiter(s) IntraMuscular once  lacosamide IVPB 200 milliGRAM(s) IV Intermittent every 12 hours  lamoTRIgine 25 milliGRAM(s) Oral two times a day  levETIRAcetam  IVPB 1000 milliGRAM(s) IV Intermittent <User Schedule>  lurasidone 40 milliGRAM(s) Oral at bedtime  naloxegol 12.5 milliGRAM(s) Oral daily  pantoprazole   Suspension 40 milliGRAM(s) Oral daily  piperacillin/tazobactam IVPB.. 3.375 Gram(s) IV Intermittent every 8 hours  polyethylene glycol 3350 17 Gram(s) Oral daily  propofol Infusion 10 MICROgram(s)/kG/Min (5.62 mL/Hr) IV Continuous <Continuous>  senna 2 Tablet(s) Oral at bedtime    NUTRITIONALLY PERTINENT LABS:   Na142 mmol/L Glu 113 mg/dL[H] K+ 3.9 mmol/L Cr  4.08 mg/dL[H] BUN 87 mg/dL[H]  Phos 6.9 mg/dL[H]  Alb 2.6 g/dL[L] ALT 46 U/L[H] AST 50 U/L[H] Alkaline Phosphatase 153 U/L[H]    Finger Sticks:    NUTRITIONALLY PERTINENT MEDICATIONS/LABS:  [x] Reviewed  [x] Relevant notes on medications/labs:  - Bumex gtt   - Hyperphosphatemia noted, K+ WNL  - Worsening BUN/Cr    EDEMA:  [x] Reviewed  [] Relevant notes:    GI/ I&O:  [x] Reviewed  [x] Relevant notes: No noted edema as per flow sheets.   [] Other:    SKIN:   [x] No pressure injuries documented, per nursing flowsheet  [] Pressure injury previously noted  [] Change in pressure injury documentation:  [] Other:    ESTIMATED NEEDS:  [x] No change:  [] Updated:  Energy: 1596 - 2040kcal/day (18 - 23 kcal/kg)  Protein: 77 - 97g/day (1.2 - 1.5 g/kg)  Fluid:   ml/day or [] defer to team  Based on: ABW of 88.8kg () for energy needs, IBW 64.4kg for protein needs  Edgar State Equation (REE): 1912kcal (21kcal/kg)    NUTRITION DIAGNOSIS:  [x] Prior Dx: Severe Acute Malnutrition  [] New Dx:    EDUCATION:  [] Yes:  [x] Not appropriate/warranted    NUTRITION CARE PLAN:  1. Diet: With current propofol rate recommend increase feeds of Vital AF to 65mL/hr x 18hr to provide 1170mL volume, 1404kcal (+444kcal from Propofol = 1848kcal), 88g protein, 949mL free water. Meets ~21kcal/kg based on actual body wt 88.8kg (), 1.4g/kg protein based on IBW 64.4kg.  2. Multivitamin/mineral supplementation: Continue vitamin D; consider multivitamin.  3. Elevated phosphorus noted, consider phosphate binders if medically appropriate.     [] Achieved - Continue current nutrition intervention(s)  [] Current medical condition precludes nutrition intervention at this time.    MONITORING AND EVALUATION:   RD remains available upon request and will follow up per protocol.    Tracy Shafer MS, RD, CDN, CNSC  Available on [facility] TEAMS

## 2025-02-27 NOTE — PROGRESS NOTE ADULT - ASSESSMENT
69 year old male with a past medical history of hypertension, hyperlipemia VAZQUEZ (on CPAP at bedtime, NC 2 liters during the day), CKD stage 3, epilepsy, schizophrenia, developmental delay, metastatic testicular cancer (s/p orchiectomy, actively on chemotherapy, last dose of etoposide/cisplatin on 6/2024), presenting with acute on chronic hypoxemic respiratory failure, secondary to (a) COVID-19 infection, (b) superimposed pneumonia after recent influenza infection, and/or (c) fluid overload.     MICU consulted and accepted after RRT due to increased work of breathing    NEURO:  #Mental status:  - Sedated, however, was AAOx2 prior to increased work of breathing that required intubation  - continue with propofol, wean as tolerated    #Epilepsy:  - Continue with home meds which include Lacosamide, Lurasidone, Lamotrigine - lamotrigine restarted 2/24 after being held on 2/18, per pharmacy restarted at 25 BID, uptitrate over weeks    #Brain Mets:  - MRI brain now with 5.4 mm enhancing lesion in the LEFT middle cerebellar peduncle with associated edema suspicious for metastases    CV:  #Echo:  Recent echo last admission showing Left ventricular cavity is normal in size. Left ventricular systolic function is normal with an ejection fraction of 62 %. There are no regional wall motion abnormalities seen. Normal right ventricular cavity size and normal right ventricular systolic function.   - Repeat echo continuing to show EF 70%    #Afib w/RVR  - New onset Afib RVR (on tele, confirmed with EKG 2/19)   - Plan: Started metoprolol 5mg IVP q6 hours & Hep gtt -> metoprolol held iso hypotension  - If rate remains uncontrolled, can start Cardizem drip at 5mg/h  - c/w Heparin drip    #HTN:  - On Hydralazine 25mg TID, Amlodipine 5mg, held iso current hypotension, currently on midodrine    PULM:  #Vent   - Due to increased work of breathing, now intubated  - SBT with 12/10 today    #AHRF  - Patient admitted to the hospital for AHRF, found to be COVID (+)  - Likely 2/2 PNA, does not appear to CHF decompensation  - Diuretics currently being held  - Continue with airway clearance regimen which includes duoneb,     RENAL:  #SHAGUFTA:   - Hx of CKD stage 3, Cr 2.38 at baseline, seems to be plateauing at 4.24  - In setting of COVID (+)  - Pre-renal, FeUrea 29%  - Monitor Cr  - Avoid nephrotoxic meds  - Wakefield in place  - Bumex 2 gtt  - Monitor BMP q12      #Electrolyte abnormalities  Hypernatremia  - Continue to monitor  - If uptrending can consider D5W  - Free water deficit 1.4  - F/u urine studies  - Nephro recs appreciated    GI:  No acute issues     #Diet: Tube feeds via OG tube    #Bowel regimen: Movantik     #Bowel Regimen  - On Senna and Miralax    ENDO:  No acute issues  Thyroid nodule noted in prior notes    HEMATOLOGIC:  #Thrombocytopenic  - Unclear origin  - Concern for possible mets to bone marrow? vs due to infection  - Continue to monitor  - Transfuse to maintain Plt>10K unless actively bleeding then maintain >50K  - Heme-Onc recs appreciated    #DVT prophylaxis   - SCD    ID:  #COVID-19  - Concern for possible superimposed PNA   - Completed zosyn  - S/p Remdesivir  - Blood cx from 2 days ago negative    SKIN:  #Lines:  2x PIV    Ethics:  - Full Code  - Contact: Sister Ester [phone]  69 year old male with a past medical history of hypertension, hyperlipemia VAZQUEZ (on CPAP at bedtime, NC 2 liters during the day), CKD stage 3, epilepsy, schizophrenia, developmental delay, metastatic testicular cancer (s/p orchiectomy, actively on chemotherapy, last dose of etoposide/cisplatin on 6/2024), presenting with acute on chronic hypoxemic respiratory failure, secondary to (a) COVID-19 infection, (b) superimposed pneumonia after recent influenza infection, and/or (c) fluid overload.     MICU consulted and accepted after RRT due to increased work of breathing    NEURO:  #Mental status:  - Sedated, however, was AAOx2 prior to increased work of breathing that required intubation  - continue with propofol, wean as tolerated    #Epilepsy:  - Continue with home meds which include Lacosamide, Lurasidone, Lamotrigine - lamotrigine restarted 2/24 after being held on 2/18, per pharmacy restarted at 25 BID, uptitrate over weeks    #Brain Mets:  - MRI brain now with 5.4 mm enhancing lesion in the LEFT middle cerebellar peduncle with associated edema suspicious for metastases    CV:  #Echo:  Recent echo last admission showing Left ventricular cavity is normal in size. Left ventricular systolic function is normal with an ejection fraction of 62 %. There are no regional wall motion abnormalities seen. Normal right ventricular cavity size and normal right ventricular systolic function.   - Repeat echo continuing to show EF 70%    #Afib w/RVR  - New onset Afib RVR (on tele, confirmed with EKG 2/19)   - Plan: Started metoprolol 5mg IVP q6 hours & Hep gtt -> metoprolol held iso hypotension  - If rate remains uncontrolled, can start Cardizem drip at 5mg/h  - c/w Heparin drip    #HTN:  - On Hydralazine 25mg TID, Amlodipine 5mg, held iso current hypotension    PULM:  #Vent   - Due to increased work of breathing, now intubated  - SBT with 12/10 today    #AHRF  - Patient admitted to the hospital for AHRF, found to be COVID (+)  - Likely 2/2 PNA, does not appear to CHF decompensation  - Diuretics currently being held  - Continue with airway clearance regimen which includes duoneb,   - CT chest with b/l GGOs, possible early COVID fibrosis, area of consolidation ?infectious vs. atelectasis    RENAL:  #SHAGUFTA:   - Hx of CKD stage 3, Cr 2.38 at baseline, seems to be plateauing at 4.24  - In setting of COVID (+)  - Pre-renal, FeUrea 29%  - Monitor Cr  - Avoid nephrotoxic meds  - Wakefield in place  - Bumex 2 gtt, will increase if pt not net negative  - Monitor BMP q12      #Electrolyte abnormalities  Hypernatremia, resolved  - Continue to monitor  - Nephro recs appreciated    GI:  No acute issues     #Diet: Tube feeds via OG tube  #Bowel Regimen  - On Senna and Miralax    ENDO:  No acute issues  Thyroid nodule noted in prior notes    HEMATOLOGIC:  #Thrombocytopenic  - Unclear origin  - Concern for possible mets to bone marrow? vs due to infection  - Continue to monitor  - Transfuse to maintain Plt>10K unless actively bleeding then maintain >50K  - Heme-Onc recs appreciated    #DVT prophylaxis   - SCD    ID:  #COVID-19  - Concern for possible superimposed PNA   - Continue zosyn 2/28  - S/p Remdesivir  - Blood cx from 2 days ago negative    SKIN:  #Lines:  2x PIV    Ethics:  - Full Code  - Contact: Sister Ester [phone]

## 2025-02-27 NOTE — PROGRESS NOTE ADULT - ATTENDING COMMENTS
68 y/o M w/hx as above including metastatic testicular cancer, epilepsy on AEDs, chronic respiratory failure with hypoxia and hypercapnia on home O2, HFpEF admitted for acute on chronic respiratory failure with hypoxia and hypercapnia likely secondary to combination of COVID PNA, bacterial superinfection, and acute pulmonary edema due to acute on chronic HFpEF. Hypotension likely severe sepsis with septic shock. SHAGUFTA likely ATN +/- venous congestion. Repeat CT chest with bilateral GGOs possibly acute pulmonary edema vs early fibrosis vs residual COVID along with L basilar consolidation likely bacterial PNA vs atelectasis.     # Acute on chronic respiratory failure with hypoxia and hypercapnia  # COVID PNA  # Bacterial PNA  # Acute pulmonary edema  # Acute on chronic HFpEF  # Hypotension  # Severe sepsis with septic shock  # SHAGUFTA  # Hyperntremia  # Epilepsy    - Continue AEDs  - Sedation as needed goal RASS -1 to 0  - Wean from vent as tolerated  - Restart pressors if needed  - Trend Cr, avoid nephrotoxins  - Diuresis goal net negative 1-2 L  - Complete course of abx  - Completed course of dexamethasone for COVID, completed remdesivir  - Hypernatremia resolved  - DVT prophylaxis

## 2025-02-27 NOTE — PROGRESS NOTE ADULT - PROBLEM SELECTOR PLAN 1
-Recent admission for acute respiratory failure in the setting of grand mal seizures, influenza, PNA. S/p intubation in MICU, was extubated to AVAPS  -Now COVID +  -CXR with low lung volumes, mild congestion. Possible underlying infiltrate  -CT chest with b/l GGO, new since 1/30/25. Likely COVID PNA +/- superimposed bacterial PNA.   -Started on HFNC 2/18  -S/p multiple RRTs for hypoxia, increased WOB. Intubated s/p RRT on 2/23 & transferred to MICU  -Continue bronchodilators  -ABX per ID/MICU  -Pressure support trials as tolerated  -Keep sats >90%.  2/26: remains intubated:  on 40% fio2:  per MICU :  cont antibiotics:  cxr with Improvement in upper lobes  2/27: seems to be doing same:  remains intubated and sedated : on antibtiocs and pressors:  cont current rx

## 2025-02-27 NOTE — PROGRESS NOTE ADULT - PROBLEM SELECTOR PLAN 3
-CT chest with b/l GGO, new since 1/30/25. Likely COVID PNA +/- superimposed bacterial PNA  -Continue ABX.  2/27 : on antibiotics:

## 2025-02-27 NOTE — PROGRESS NOTE ADULT - SUBJECTIVE AND OBJECTIVE BOX
[facility] [place] HEMATOLOGY/ONCOLOGY INPATIENT PROGRESS NOTE     Interval Hx:   02-27-25: Mr. Gr was seen at bedside today.    Meds:   MEDICATIONS  (STANDING):  albuterol/ipratropium for Nebulization 3 milliLiter(s) Nebulizer every 6 hours  atorvastatin 20 milliGRAM(s) Oral at bedtime  buMETAnide Infusion 2 mG/Hr (10 mL/Hr) IV Continuous <Continuous>  chlorhexidine 0.12% Liquid 15 milliLiter(s) Oral Mucosa every 12 hours  cholecalciferol 1000 Unit(s) Oral daily  dexAMETHasone  Injectable 6 milliGRAM(s) IV Push daily  escitalopram 15 milliGRAM(s) Oral with breakfast  fentaNYL    Injectable 50 MICROGram(s) IV Push once  gabapentin Solution 150 milliGRAM(s) Oral every 12 hours  heparin  Infusion.  Unit(s)/Hr (17 mL/Hr) IV Continuous <Continuous>  influenza  Vaccine (HIGH DOSE) 0.5 milliLiter(s) IntraMuscular once  lacosamide IVPB 200 milliGRAM(s) IV Intermittent every 12 hours  lamoTRIgine 25 milliGRAM(s) Oral two times a day  levETIRAcetam  IVPB 1000 milliGRAM(s) IV Intermittent <User Schedule>  lurasidone 40 milliGRAM(s) Oral at bedtime  naloxegol 12.5 milliGRAM(s) Oral daily  pantoprazole   Suspension 40 milliGRAM(s) Oral daily  piperacillin/tazobactam IVPB.. 3.375 Gram(s) IV Intermittent every 8 hours  polyethylene glycol 3350 17 Gram(s) Oral daily  potassium chloride   Solution 10 milliEquivalent(s) Oral once  propofol Infusion 10 MICROgram(s)/kG/Min (5.62 mL/Hr) IV Continuous <Continuous>  senna 2 Tablet(s) Oral at bedtime    MEDICATIONS  (PRN):    Vital Signs Last 24 Hrs  T(C): 37.5 (27 Feb 2025 00:00), Max: 37.8 (26 Feb 2025 08:00)  T(F): 99.5 (27 Feb 2025 00:00), Max: 100 (26 Feb 2025 08:00)  HR: 87 (27 Feb 2025 03:42) (86 - 127)  BP: 106/55 (27 Feb 2025 03:00) (70/48 - 159/80)  BP(mean): 73 (27 Feb 2025 03:00) (55 - 112)  RR: 16 (27 Feb 2025 03:00) (16 - 31)  SpO2: 100% (27 Feb 2025 03:42) (87% - 100%)    Parameters below as of 26 Feb 2025 23:47  Patient On (Oxygen Delivery Method): ventilator    Physical Exam:  Gen: Intubated  HEENT: EOMI, MMM  Chest: equal chest rise  Cardiac: regular   Abd: non distended   Neuro: sedated     Labs:                        7.5    6.50  )-----------( 104      ( 27 Feb 2025 00:24 )             23.4     CBC Full  -  ( 27 Feb 2025 00:24 )  WBC Count : 6.50 K/uL  RBC Count : 2.38 M/uL  Hemoglobin : 7.5 g/dL  Hematocrit : 23.4 %  Platelet Count - Automated : 104 K/uL  Mean Cell Volume : 98.3 fl  Mean Cell Hemoglobin : 31.5 pg  Mean Cell Hemoglobin Concentration : 32.1 g/dL    02-27    142  |  98  |  87[H]  ----------------------------<  113[H]  3.9   |  22  |  4.08[H]    Ca    9.2      27 Feb 2025 00:24  Phos  6.9     02-27  Mg     2.2     02-27    TPro  6.8  /  Alb  2.6[L]  /  TBili  0.3  /  DBili  x   /  AST  50[H]  /  ALT  46[H]  /  AlkPhos  153[H]  02-27    PT/INR - ( 27 Feb 2025 00:24 )   PT: 11.8 sec;   INR: 1.04 ratio         PTT - ( 27 Feb 2025 00:24 )  PTT:62.4 sec  Bilirubin Total: 0.3 mg/dL (02-27-25 @ 00:24)   [facility] HEMATOLOGY/ONCOLOGY INPATIENT PROGRESS NOTE     Interval Hx:   02-27-25: Mr. Gr was seen at bedside today, remains under the care of the ICU, intubated, no signs of bleeding and continues on heparin drip, counts stable, has not required PRBC support this admission    Meds:   MEDICATIONS  (STANDING):  albuterol/ipratropium for Nebulization 3 milliLiter(s) Nebulizer every 6 hours  atorvastatin 20 milliGRAM(s) Oral at bedtime  buMETAnide Infusion 2 mG/Hr (10 mL/Hr) IV Continuous <Continuous>  chlorhexidine 0.12% Liquid 15 milliLiter(s) Oral Mucosa every 12 hours  cholecalciferol 1000 Unit(s) Oral daily  dexAMETHasone  Injectable 6 milliGRAM(s) IV Push daily  escitalopram 15 milliGRAM(s) Oral with breakfast  fentaNYL    Injectable 50 MICROGram(s) IV Push once  gabapentin Solution 150 milliGRAM(s) Oral every 12 hours  heparin  Infusion.  Unit(s)/Hr (17 mL/Hr) IV Continuous <Continuous>  influenza  Vaccine (HIGH DOSE) 0.5 milliLiter(s) IntraMuscular once  lacosamide IVPB 200 milliGRAM(s) IV Intermittent every 12 hours  lamoTRIgine 25 milliGRAM(s) Oral two times a day  levETIRAcetam  IVPB 1000 milliGRAM(s) IV Intermittent <User Schedule>  lurasidone 40 milliGRAM(s) Oral at bedtime  naloxegol 12.5 milliGRAM(s) Oral daily  pantoprazole   Suspension 40 milliGRAM(s) Oral daily  piperacillin/tazobactam IVPB.. 3.375 Gram(s) IV Intermittent every 8 hours  polyethylene glycol 3350 17 Gram(s) Oral daily  potassium chloride   Solution 10 milliEquivalent(s) Oral once  propofol Infusion 10 MICROgram(s)/kG/Min (5.62 mL/Hr) IV Continuous <Continuous>  senna 2 Tablet(s) Oral at bedtime    MEDICATIONS  (PRN):    Vital Signs Last 24 Hrs  T(C): 37.5 (27 Feb 2025 00:00), Max: 37.8 (26 Feb 2025 08:00)  T(F): 99.5 (27 Feb 2025 00:00), Max: 100 (26 Feb 2025 08:00)  HR: 87 (27 Feb 2025 03:42) (86 - 127)  BP: 106/55 (27 Feb 2025 03:00) (70/48 - 159/80)  BP(mean): 73 (27 Feb 2025 03:00) (55 - 112)  RR: 16 (27 Feb 2025 03:00) (16 - 31)  SpO2: 100% (27 Feb 2025 03:42) (87% - 100%)    Parameters below as of 26 Feb 2025 23:47  Patient On (Oxygen Delivery Method): ventilator    Physical Exam:  Gen: Intubated  HEENT: EOMI, MMM  Chest: equal chest rise  Cardiac: regular   Abd: non distended   Neuro: sedated     Labs:                        7.5    6.50  )-----------( 104      ( 27 Feb 2025 00:24 )             23.4     CBC Full  -  ( 27 Feb 2025 00:24 )  WBC Count : 6.50 K/uL  RBC Count : 2.38 M/uL  Hemoglobin : 7.5 g/dL  Hematocrit : 23.4 %  Platelet Count - Automated : 104 K/uL  Mean Cell Volume : 98.3 fl  Mean Cell Hemoglobin : 31.5 pg  Mean Cell Hemoglobin Concentration : 32.1 g/dL    02-27    142  |  98  |  87[H]  ----------------------------<  113[H]  3.9   |  22  |  4.08[H]    Ca    9.2      27 Feb 2025 00:24  Phos  6.9     02-27  Mg     2.2     02-27    TPro  6.8  /  Alb  2.6[L]  /  TBili  0.3  /  DBili  x   /  AST  50[H]  /  ALT  46[H]  /  AlkPhos  153[H]  02-27    PT/INR - ( 27 Feb 2025 00:24 )   PT: 11.8 sec;   INR: 1.04 ratio         PTT - ( 27 Feb 2025 00:24 )  PTT:62.4 sec  Bilirubin Total: 0.3 mg/dL (02-27-25 @ 00:24)

## 2025-02-27 NOTE — PROGRESS NOTE ADULT - PROBLEM SELECTOR PLAN 7
Continue with home atorvastatin 20 mg bedtime for HLD, vitamin D supplementation, pantoprazole 40 mg daily, senna 2 tab bedtime, and Miralax 17 gram daily.     General  - DVT prophylaxis: heparin 5000 units q8h --> hep gtt per ICU  - Diet: regular   - Medication reconciliation: complete   - Code: Full   - Medications: Tylenol 650 mg q6h as needed for pain, Maalox 30 mL q4h as needed for acid reflux, melatonin 3 mg bedtime as needed for insomnia, Zofran 4 mg IV q8h as needed for nausea/vomiting      2/16/25 --> plan was discussed with patient's brother Fernando (over the phone, [phone]) in detail. He agrees with plan. All questions answered. He asked that I update Ester (sister, [phone]); she was called and updated as well, also agrees with the plan, also answered all questions.

## 2025-02-27 NOTE — PROGRESS NOTE ADULT - SUBJECTIVE AND OBJECTIVE BOX
*******************************  Navya Michelle PGY-3  *******************************    INTERVAL HPI/OVERNIGHT EVENTS:    SUBJECTIVE: Patient seen and examined at bedside.     OBJECTIVE:    VITAL SIGNS:  ICU Vital Signs Last 24 Hrs  T(C): 37.7 (27 Feb 2025 04:00), Max: 37.8 (26 Feb 2025 08:00)  T(F): 99.9 (27 Feb 2025 04:00), Max: 100 (26 Feb 2025 08:00)  HR: 91 (27 Feb 2025 06:00) (86 - 127)  BP: 96/55 (27 Feb 2025 05:00) (70/48 - 159/80)  BP(mean): 70 (27 Feb 2025 05:00) (55 - 112)  ABP: --  ABP(mean): --  RR: 16 (27 Feb 2025 06:00) (16 - 31)  SpO2: 96% (27 Feb 2025 06:00) (87% - 100%)    O2 Parameters below as of 27 Feb 2025 05:32  Patient On (Oxygen Delivery Method): ventilator          Mode: AC/ CMV (Assist Control/ Continuous Mandatory Ventilation), RR (machine): 16, TV (machine): 500, FiO2: 30, PEEP: 8, ITime: 1, MAP: 14, PIP: 35    02-26 @ 07:01  -  02-27 @ 07:00  --------------------------------------------------------  IN: 2870.4 mL / OUT: 2700 mL / NET: 170.4 mL      CAPILLARY BLOOD GLUCOSE      PHYSICAL EXAM:  GENERAL: Intubated sedated  HEENT: NCAT  NECK: supple without JVD  CHEST/LUNG: breath sounds bilaterally  CARDIOVASCULAR: regular rate and rhythm, normal S1 and S2, no murmur/rub/gallop  ABDOMEN: positive bowel sounds, non-distended, no organomegaly   EXTREMITIES: no lower extremity edema  NEUROLOGY: sedated      MEDICATIONS:  MEDICATIONS  (STANDING):  albuterol/ipratropium for Nebulization 3 milliLiter(s) Nebulizer every 6 hours  atorvastatin 20 milliGRAM(s) Oral at bedtime  buMETAnide Infusion 2 mG/Hr (10 mL/Hr) IV Continuous <Continuous>  chlorhexidine 0.12% Liquid 15 milliLiter(s) Oral Mucosa every 12 hours  cholecalciferol 1000 Unit(s) Oral daily  escitalopram 15 milliGRAM(s) Oral with breakfast  gabapentin Solution 150 milliGRAM(s) Oral every 12 hours  heparin  Infusion.  Unit(s)/Hr (17 mL/Hr) IV Continuous <Continuous>  influenza  Vaccine (HIGH DOSE) 0.5 milliLiter(s) IntraMuscular once  lacosamide IVPB 200 milliGRAM(s) IV Intermittent every 12 hours  lamoTRIgine 25 milliGRAM(s) Oral two times a day  levETIRAcetam  IVPB 1000 milliGRAM(s) IV Intermittent <User Schedule>  lurasidone 40 milliGRAM(s) Oral at bedtime  naloxegol 12.5 milliGRAM(s) Oral daily  pantoprazole   Suspension 40 milliGRAM(s) Oral daily  piperacillin/tazobactam IVPB.. 3.375 Gram(s) IV Intermittent every 8 hours  polyethylene glycol 3350 17 Gram(s) Oral daily  propofol Infusion 10 MICROgram(s)/kG/Min (5.62 mL/Hr) IV Continuous <Continuous>  senna 2 Tablet(s) Oral at bedtime    MEDICATIONS  (PRN):      ALLERGIES:  Allergies    seasonal allergies (Unknown)  penicillin (Hives)    Intolerances    latex (Rash)      LABS:                        7.5    6.50  )-----------( 104      ( 27 Feb 2025 00:24 )             23.4     02-27    142  |  98  |  87[H]  ----------------------------<  113[H]  3.9   |  22  |  4.08[H]    Ca    9.2      27 Feb 2025 00:24  Phos  6.9     02-27  Mg     2.2     02-27    TPro  6.8  /  Alb  2.6[L]  /  TBili  0.3  /  DBili  x   /  AST  50[H]  /  ALT  46[H]  /  AlkPhos  153[H]  02-27    PT/INR - ( 27 Feb 2025 00:24 )   PT: 11.8 sec;   INR: 1.04 ratio         PTT - ( 27 Feb 2025 00:24 )  PTT:62.4 sec  Urinalysis Basic - ( 27 Feb 2025 00:24 )    Color: x / Appearance: x / SG: x / pH: x  Gluc: 113 mg/dL / Ketone: x  / Bili: x / Urobili: x   Blood: x / Protein: x / Nitrite: x   Leuk Esterase: x / RBC: x / WBC x   Sq Epi: x / Non Sq Epi: x / Bacteria: x        RADIOLOGY & ADDITIONAL TESTS: Reviewed.     *******************************  Navya Michelle PGY-3  *******************************    INTERVAL HPI/OVERNIGHT EVENTS: No acute overnight events. FWF decreased to 100 q8    SUBJECTIVE: Patient seen and examined at bedside. Unable to engage in interview    OBJECTIVE:    VITAL SIGNS:  ICU Vital Signs Last 24 Hrs  T(C): 37.7 (27 Feb 2025 04:00), Max: 37.8 (26 Feb 2025 08:00)  T(F): 99.9 (27 Feb 2025 04:00), Max: 100 (26 Feb 2025 08:00)  HR: 91 (27 Feb 2025 06:00) (86 - 127)  BP: 96/55 (27 Feb 2025 05:00) (70/48 - 159/80)  BP(mean): 70 (27 Feb 2025 05:00) (55 - 112)  ABP: --  ABP(mean): --  RR: 16 (27 Feb 2025 06:00) (16 - 31)  SpO2: 96% (27 Feb 2025 06:00) (87% - 100%)    O2 Parameters below as of 27 Feb 2025 05:32  Patient On (Oxygen Delivery Method): ventilator          Mode: AC/ CMV (Assist Control/ Continuous Mandatory Ventilation), RR (machine): 16, TV (machine): 500, FiO2: 30, PEEP: 8, ITime: 1, MAP: 14, PIP: 35    02-26 @ 07:01  -  02-27 @ 07:00  --------------------------------------------------------  IN: 2870.4 mL / OUT: 2700 mL / NET: 170.4 mL      CAPILLARY BLOOD GLUCOSE      PHYSICAL EXAM:  GENERAL: Intubated sedated  HEENT: NCAT  NECK: supple without JVD  CHEST/LUNG: breath sounds bilaterally  CARDIOVASCULAR: regular rate and rhythm, normal S1 and S2, no murmur/rub/gallop  ABDOMEN: positive bowel sounds, non-distended, no organomegaly   EXTREMITIES: no lower extremity edema, b/l upper extremities edematous  NEUROLOGY: sedated      MEDICATIONS:  MEDICATIONS  (STANDING):  albuterol/ipratropium for Nebulization 3 milliLiter(s) Nebulizer every 6 hours  atorvastatin 20 milliGRAM(s) Oral at bedtime  buMETAnide Infusion 2 mG/Hr (10 mL/Hr) IV Continuous <Continuous>  chlorhexidine 0.12% Liquid 15 milliLiter(s) Oral Mucosa every 12 hours  cholecalciferol 1000 Unit(s) Oral daily  escitalopram 15 milliGRAM(s) Oral with breakfast  gabapentin Solution 150 milliGRAM(s) Oral every 12 hours  heparin  Infusion.  Unit(s)/Hr (17 mL/Hr) IV Continuous <Continuous>  influenza  Vaccine (HIGH DOSE) 0.5 milliLiter(s) IntraMuscular once  lacosamide IVPB 200 milliGRAM(s) IV Intermittent every 12 hours  lamoTRIgine 25 milliGRAM(s) Oral two times a day  levETIRAcetam  IVPB 1000 milliGRAM(s) IV Intermittent <User Schedule>  lurasidone 40 milliGRAM(s) Oral at bedtime  naloxegol 12.5 milliGRAM(s) Oral daily  pantoprazole   Suspension 40 milliGRAM(s) Oral daily  piperacillin/tazobactam IVPB.. 3.375 Gram(s) IV Intermittent every 8 hours  polyethylene glycol 3350 17 Gram(s) Oral daily  propofol Infusion 10 MICROgram(s)/kG/Min (5.62 mL/Hr) IV Continuous <Continuous>  senna 2 Tablet(s) Oral at bedtime    MEDICATIONS  (PRN):      ALLERGIES:  Allergies    seasonal allergies (Unknown)  penicillin (Hives)    Intolerances    latex (Rash)      LABS:                        7.5    6.50  )-----------( 104      ( 27 Feb 2025 00:24 )             23.4     02-27    142  |  98  |  87[H]  ----------------------------<  113[H]  3.9   |  22  |  4.08[H]    Ca    9.2      27 Feb 2025 00:24  Phos  6.9     02-27  Mg     2.2     02-27    TPro  6.8  /  Alb  2.6[L]  /  TBili  0.3  /  DBili  x   /  AST  50[H]  /  ALT  46[H]  /  AlkPhos  153[H]  02-27    PT/INR - ( 27 Feb 2025 00:24 )   PT: 11.8 sec;   INR: 1.04 ratio         PTT - ( 27 Feb 2025 00:24 )  PTT:62.4 sec  Urinalysis Basic - ( 27 Feb 2025 00:24 )    Color: x / Appearance: x / SG: x / pH: x  Gluc: 113 mg/dL / Ketone: x  / Bili: x / Urobili: x   Blood: x / Protein: x / Nitrite: x   Leuk Esterase: x / RBC: x / WBC x   Sq Epi: x / Non Sq Epi: x / Bacteria: x        RADIOLOGY & ADDITIONAL TESTS: Reviewed.

## 2025-02-27 NOTE — PROGRESS NOTE ADULT - NUTRITIONAL ASSESSMENT
This patient has been assessed with a concern for Malnutrition and has been determined to have a diagnosis/diagnoses of Severe protein-calorie malnutrition.    This patient is being managed with:   Diet NPO with Tube Feed-  Tube Feeding Modality: Orogastric  Vital AF (VITALAFRTH)  Total Volume for 24 Hours (mL): 1080  Intermittent  Starting Tube Feed Rate {mL per Hour}: 10  Increase Tube Feed Rate by (mL): 10    Every 4 hours  Until Goal Tube Feed Rate (mL per Hour): 60  Tube Feeding Hours ON: 18  Tube Feeding OFF (Hours): 6  Tube Feed Start Time: 11:00  Free Water Flush  Bolus   Total Volume per Flush (mL): 200   Frequency: Every 6 Hours  Entered: Feb 27 2025  2:19AM

## 2025-02-27 NOTE — PROGRESS NOTE ADULT - SUBJECTIVE AND OBJECTIVE BOX
SUBJECTIVE/ OVERNIGHT EVENTS:  Intubated sedated.  on bumex gtt  repeat CT chest reviewed.     --------------------------------------------------------------------------------------------  LABS:                        7.5    6.50  )-----------( 104      ( 27 Feb 2025 00:24 )             23.4     02-27    140  |  95[L]  |  92[H]  ----------------------------<  179[H]  4.3   |  24  |  4.03[H]    Ca    9.2      27 Feb 2025 09:21  Phos  6.4     02-27  Mg     2.2     02-27    TPro  6.8  /  Alb  2.6[L]  /  TBili  0.3  /  DBili  x   /  AST  50[H]  /  ALT  46[H]  /  AlkPhos  153[H]  02-27    PT/INR - ( 27 Feb 2025 00:24 )   PT: 11.8 sec;   INR: 1.04 ratio         PTT - ( 27 Feb 2025 00:24 )  PTT:62.4 sec  CAPILLARY BLOOD GLUCOSE            Urinalysis Basic - ( 27 Feb 2025 09:21 )    Color: x / Appearance: x / SG: x / pH: x  Gluc: 179 mg/dL / Ketone: x  / Bili: x / Urobili: x   Blood: x / Protein: x / Nitrite: x   Leuk Esterase: x / RBC: x / WBC x   Sq Epi: x / Non Sq Epi: x / Bacteria: x        RADIOLOGY & ADDITIONAL TESTS:    Imaging Personally Reviewed:  [x] YES  [ ] NO    Consultant(s) Notes Reviewed:  [x] YES  [ ] NO    MEDICATIONS  (STANDING):  albuterol/ipratropium for Nebulization 3 milliLiter(s) Nebulizer every 6 hours  atorvastatin 20 milliGRAM(s) Oral at bedtime  buMETAnide Infusion 2 mG/Hr (10 mL/Hr) IV Continuous <Continuous>  chlorhexidine 0.12% Liquid 15 milliLiter(s) Oral Mucosa every 12 hours  cholecalciferol 1000 Unit(s) Oral daily  escitalopram 15 milliGRAM(s) Oral with breakfast  gabapentin Solution 150 milliGRAM(s) Oral every 12 hours  heparin  Infusion.  Unit(s)/Hr (17 mL/Hr) IV Continuous <Continuous>  influenza  Vaccine (HIGH DOSE) 0.5 milliLiter(s) IntraMuscular once  lacosamide IVPB 200 milliGRAM(s) IV Intermittent every 12 hours  lamoTRIgine 25 milliGRAM(s) Oral two times a day  levETIRAcetam  IVPB 1000 milliGRAM(s) IV Intermittent <User Schedule>  lurasidone 40 milliGRAM(s) Oral at bedtime  naloxegol 12.5 milliGRAM(s) Oral daily  pantoprazole   Suspension 40 milliGRAM(s) Oral daily  piperacillin/tazobactam IVPB.. 3.375 Gram(s) IV Intermittent every 8 hours  polyethylene glycol 3350 17 Gram(s) Oral daily  propofol Infusion 10 MICROgram(s)/kG/Min (5.62 mL/Hr) IV Continuous <Continuous>  senna 2 Tablet(s) Oral at bedtime    MEDICATIONS  (PRN):      Care Discussed with Consultants/Other Providers [x] YES  [ ] NO    Vital Signs Last 24 Hrs  T(C): 37.4 (27 Feb 2025 12:00), Max: 37.7 (27 Feb 2025 04:00)  T(F): 99.3 (27 Feb 2025 12:00), Max: 99.9 (27 Feb 2025 04:00)  HR: 87 (27 Feb 2025 12:00) (87 - 127)  BP: 118/65 (27 Feb 2025 12:00) (70/48 - 145/74)  BP(mean): 86 (27 Feb 2025 12:00) (55 - 100)  RR: 24 (27 Feb 2025 12:00) (16 - 28)  SpO2: 93% (27 Feb 2025 12:00) (91% - 100%)    Parameters below as of 27 Feb 2025 11:49  Patient On (Oxygen Delivery Method): ventilator      I&O's Summary    26 Feb 2025 07:01  -  27 Feb 2025 07:00  --------------------------------------------------------  IN: 3430.8 mL / OUT: 2920 mL / NET: 510.8 mL    27 Feb 2025 07:01  -  27 Feb 2025 12:43  --------------------------------------------------------  IN: 785.8 mL / OUT: 485 mL / NET: 300.8 mL      PHYSICAL EXAM:  GENERAL: intubated, sedated  HEAD:  Atraumatic, Normocephalic  EYES: EOMI, PERRLA, conjunctiva and sclera clear  NECK: Supple, No JVD  CHEST/LUNG: mild decrease breath sounds bilaterally; No wheeze   HEART: Regular rate and rhythm; No murmurs, rubs, or gallops  ABDOMEN: Soft, Nontender, Nondistended; Bowel sounds present  Neuro:  intubated, sedated  EXTREMITIES:  2+ Peripheral Pulses, No clubbing, cyanosis, or edema  SKIN: No rashes or lesions

## 2025-02-27 NOTE — PROGRESS NOTE ADULT - ASSESSMENT
69 year old male with a past medical history of hypertension, hyperlipemia VAZQUEZ (on CPAP at bedtime, NC 2 liters during the day), CKD stage 3, epilepsy, schizophrenia, developmental delay, metastatic testicular cancer (s/p orchiectomy, actively on chemotherapy, last dose of etoposide/cisplatin on 6/2024) presenting with worsening shortness of breath. Patient was recently hospitalized at [facility] from January 27th 2025 to February 6th 2025 for seizure and influenza virus infection, which required intubation. He was sent to rehab (originally from home) from hospital. He returns due to sudden onset shortness of breath and worsening oxygenation. Of note, he tested positive for COVID-19       1- SHAGUFTA on CKDIII  2- covid-19  3- anemia  4- hypotension  bp is better   5- respiratory failure   6- chf       SHAGUFTA suspected in setting of new infection. covid 19   cr is now starting to improve   bumex 2mg/hr drip to cont  keep O> I   hyperphosphatemia add phoslo one tid   decadron 6 mg iv daily   suspect superimposed pna tx with zosyn 3.375 g iv tid   anemia, trend hgb  d.w icu team when seen earlier

## 2025-02-27 NOTE — PROGRESS NOTE ADULT - ASSESSMENT
Mr. Gr is a 69 year old male with PMHx of seizure disorder, sleep apnea, HTN, chronic idiopathic constipation, stage III CKD, severe obesity, secondary hyperparathyroidism, anemia, thrombocytopenia, hyperlipidemia, testicular cancer under treatment with Dr. Ovalles who is admitted for acute hypoxic respiratory failure secondary to COVID vs superimposed pneumonia with new onset thrombocytopenia. He was recently discharged 02/06/2025 after a tenous stay including intubation in the ICU for respiratory failure in setting of seizure. He had recovered and was discharged to rehab.      Former smoker, quit 14y prior, denied ETOH, drug use. Lives at home. Does not have children. Denies family history of blood disorders or malignancy.  Denies previous workplace related exposures.  Denies previous history of blood transfusions.  Denied history of thromboses.  Denied history of  requiring anticoagulation.     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  Margins were negative.  pT3Nx. CT C/A/P with few left para-aortic and left iliac chain lymph nodes largest measuring 8.1 cm left para-aortic node, bilateral subcentimeter noncalcified pulmonary nodules measuring up to 7 mm. AFP, LDH, hCG were all elevated. 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 Started on adjuvant therapy with Cisplatin+Etoposide, so far completed 4 cycles of treatment with cisplatin dose reduced 50% and Etoposide 50% due to thrombocytopenia.      02/19/2025: on HFNC, noted tachycardia and fever, Klebsiella in urine as well, thrombocytopenia stable/improving, no signs of active bleeding     02/20/2025: developed A.fib with RVR and was placed on heparin drip and pending cardiology eval    02/21/2025: awake, on AVAPS overnight, noted RRT for hypoxia as pt removed HFNC at some point in time, good response thereafter     02/22/2025:  continues on AVAPS this morning, noted RRT overnight for hypoxia, hypercapnia, ICU following, US LE negative for DVT, counts overall stable/improved     02/23/2025: progressive respiratory failure, noted ongoing RTT this morning with pending intubation and transfer to MICU     02/24/2025: intubated 02/23/2025, sedated, on pressor support, no signs of bleeding, counts noted, no signs of bleeding     02/25/2025: continues in MICU, intubated and sedated, no signs of bleeding    02/26/2025: continues in MICU, intubated, without signs of bleeding, continues on heparin drip         #Acute hypoxic respiratory failure  # COVID  - CT noted with bilateral GGO  - ID following  - Pulmonary following  - Antibiotics per primary team/MICU and ID   - Intubated 02/23/2025 AM, MICU     # Thrombocytopenia   - Did occur during most recent admission and improved to normal prior to discharge   - Without signs of bleeding  - Could be multifactorial, possibly due to infection   - Continue to trend  - Transfuse to maintain Plt > 10k unless actively bleeding then maintain > 50k     # Brain lesion  - pt with hx of seizures  - MRI brain now with 5.4 mm enhancing lesion in the LEFT middle cerebellar peduncle with associated edema suspicious for metastases  - MRI Lumbar negative for acute disease  - Small lesion without mass effect or edema, recommended to correlate per neurology if foci and locus are concordant with seizure activity  - Neurology recs noted, new lesion not likely to cause seizure  - It is rare, but nonetheless not impossible, for testicular cancer to be metastatic to the brain  - He will need another PET-CT, can be completed outpatient once stable if concern can proceed with biopsy at that time   - Previous endocrinology eval for thyroid nodule noted  - AFP/[facility] normal,  previously      # 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 Ovalles, agree with current plan  - Follow up as outpatient with Franki Ovalles MD     # Acute kidney injury on CKD Stage IIIA  - Likely multifactorial  - Nephrology consult and recs noted  - Continue to trend     # Normocytic anemia  - Chronic, has hx of PRBC during chemo 07/2024  - Noted Anti E, Anti-K Anti-C antibodies previously  - Monitor for bleeding  - Recommend to transfuse to maintain Hb > 7    # Klebsiella UTI  -Antibiotics per ID and primary team       Recommendations  - Trend with CBC daily   - Transfuse to maintain Hb > 7   - Transfuse to maintain Plt >10k unless active bleeding then >50k   - Monitor renal function  - Antibiotics per primary team/MICU and ID   - Cardiology follow up to address anticoagulation, currently on heparin drip   - f/u ongoing ICU recs         Thank you for allowing me to participate in the care of Mr. Gr please do not hesitate to call or text me if you have further questions or concerns.     Brayan Mart MD  [facility] [place]   Division of Hematology/Oncology  [address] [place] [address], [address]  [place] [address]  P: [phone]  F: [phone]    Attestation:    ----Pt evaluated including face-to-face interaction in addition to chart review, reviewing treatment plan, and managing the patient’s chronic diagnoses as listed in the assessment----        Mr. Gr is a 69 year old male with PMHx of seizure disorder, sleep apnea, HTN, chronic idiopathic constipation, stage III CKD, severe obesity, secondary hyperparathyroidism, anemia, thrombocytopenia, hyperlipidemia, testicular cancer under treatment with Dr. Ovalles who is admitted for acute hypoxic respiratory failure secondary to COVID vs superimposed pneumonia with new onset thrombocytopenia. He was recently discharged 02/06/2025 after a tenous stay including intubation in the ICU for respiratory failure in setting of seizure. He had recovered and was discharged to rehab.      Former smoker, quit 14y prior, denied ETOH, drug use. Lives at home. Does not have children. Denies family history of blood disorders or malignancy.  Denies previous workplace related exposures.  Denies previous history of blood transfusions.  Denied history of thromboses.  Denied history of  requiring anticoagulation.     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  Margins were negative.  pT3Nx. CT C/A/P with few left para-aortic and left iliac chain lymph nodes largest measuring 8.1 cm left para-aortic node, bilateral subcentimeter noncalcified pulmonary nodules measuring up to 7 mm. AFP, LDH, hCG were all elevated. 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 Started on adjuvant therapy with Cisplatin+Etoposide, so far completed 4 cycles of treatment with cisplatin dose reduced 50% and Etoposide 50% due to thrombocytopenia.      02/19/2025: on HFNC, noted tachycardia and fever, Klebsiella in urine as well, thrombocytopenia stable/improving, no signs of active bleeding     02/20/2025: developed A.fib with RVR and was placed on heparin drip and pending cardiology eval    02/21/2025: awake, on AVAPS overnight, noted RRT for hypoxia as pt removed HFNC at some point in time, good response thereafter     02/22/2025:  continues on AVAPS this morning, noted RRT overnight for hypoxia, hypercapnia, ICU following, US LE negative for DVT, counts overall stable/improved     02/23/2025: progressive respiratory failure, noted ongoing RTT this morning with pending intubation and transfer to MICU     02/24/2025: intubated 02/23/2025, sedated, on pressor support, no signs of bleeding, counts noted, no signs of bleeding     02/25/2025: continues in MICU, intubated and sedated, no signs of bleeding    02/26/2025: continues in MICU, intubated, without signs of bleeding, continues on heparin drip     02/27/2025:  remains under the care of the ICU, intubated, no signs of bleeding and continues on heparin drip, counts stable, has not required PRBC support this admission      #Acute hypoxic respiratory failure  # COVID  - CT noted with bilateral GGO  - ID following  - Pulmonary following  - Antibiotics per primary team/MICU and ID   - Intubated 02/23/2025 AM, MICU     # Thrombocytopenia   - Did occur during most recent admission and improved to normal prior to discharge   - Without signs of bleeding  - Could be multifactorial, possibly due to infection   - Continue to trend  - Transfuse to maintain Plt > 10k unless actively bleeding then maintain > 50k     # Brain lesion  - pt with hx of seizures  - MRI brain now with 5.4 mm enhancing lesion in the LEFT middle cerebellar peduncle with associated edema suspicious for metastases  - MRI Lumbar negative for acute disease  - Small lesion without mass effect or edema, recommended to correlate per neurology if foci and locus are concordant with seizure activity  - Neurology recs noted, new lesion not likely to cause seizure  - It is rare, but nonetheless not impossible, for testicular cancer to be metastatic to the brain  - He will need another PET-CT, can be completed outpatient once stable if concern can proceed with biopsy at that time   - Previous endocrinology eval for thyroid nodule noted  - AFP/[facility]CH normal,  previously      # 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 Ovalles, agree with current plan  - Follow up as outpatient with Franki Ovalles MD     # Acute kidney injury on CKD Stage IIIA  - Likely multifactorial  - Nephrology consult and recs noted  - Continue to trend     # Normocytic anemia  - Chronic, has hx of PRBC during chemo 07/2024  - Noted Anti E, Anti-K Anti-C antibodies previously  - Monitor for bleeding  - Recommend to transfuse to maintain Hb > 7    # Klebsiella UTI  -Antibiotics per ID and primary team       Recommendations  - Trend with CBC daily   - Transfuse to maintain Hb > 7   - Transfuse to maintain Plt >10k unless active bleeding then >50k   - Monitor renal function  - Antibiotics per primary team/MICU and ID   - Cardiology follow up to address anticoagulation, currently on heparin drip   - f/u ongoing ICU recs         Thank you for allowing me to participate in the care of Mr. Gr please do not hesitate to call or text me if you have further questions or concerns.     Brayan Mart MD  [facility] [place]   Division of Hematology/Oncology  [facility] [facility], [address]  [place] [address]  P: [phone]  F: [phone]    Attestation:    ----Pt evaluated including face-to-face interaction in addition to chart review, reviewing treatment plan, and managing the patient’s chronic diagnoses as listed in the assessment----

## 2025-02-27 NOTE — PROGRESS NOTE ADULT - SUBJECTIVE AND OBJECTIVE BOX
Date of Service: 02-27-25 @ 16:00    Patient is a 69y old  Male who presents with a chief complaint of Acute on chronic hypoxemic respiratory failure (27 Feb 2025 15:48)      Any change in ROS: remains intubated and sedated   on bumex drip     MEDICATIONS  (STANDING):  albuterol/ipratropium for Nebulization 3 milliLiter(s) Nebulizer every 6 hours  atorvastatin 20 milliGRAM(s) Oral at bedtime  buMETAnide Infusion 2 mG/Hr (10 mL/Hr) IV Continuous <Continuous>  chlorhexidine 0.12% Liquid 15 milliLiter(s) Oral Mucosa every 12 hours  cholecalciferol 1000 Unit(s) Oral daily  escitalopram 15 milliGRAM(s) Oral with breakfast  gabapentin Solution 150 milliGRAM(s) Oral every 12 hours  heparin  Infusion.  Unit(s)/Hr (17 mL/Hr) IV Continuous <Continuous>  influenza  Vaccine (HIGH DOSE) 0.5 milliLiter(s) IntraMuscular once  lacosamide IVPB 200 milliGRAM(s) IV Intermittent every 12 hours  lamoTRIgine 25 milliGRAM(s) Oral two times a day  levETIRAcetam  IVPB 1000 milliGRAM(s) IV Intermittent <User Schedule>  lurasidone 40 milliGRAM(s) Oral at bedtime  naloxegol 12.5 milliGRAM(s) Oral daily  pantoprazole   Suspension 40 milliGRAM(s) Oral daily  piperacillin/tazobactam IVPB.. 3.375 Gram(s) IV Intermittent every 8 hours  polyethylene glycol 3350 17 Gram(s) Oral daily  propofol Infusion 10 MICROgram(s)/kG/Min (5.62 mL/Hr) IV Continuous <Continuous>  senna 2 Tablet(s) Oral at bedtime    MEDICATIONS  (PRN):    Vital Signs Last 24 Hrs  T(C): 37.4 (27 Feb 2025 12:00), Max: 37.7 (27 Feb 2025 04:00)  T(F): 99.3 (27 Feb 2025 12:00), Max: 99.9 (27 Feb 2025 04:00)  HR: 91 (27 Feb 2025 15:00) (87 - 127)  BP: 124/66 (27 Feb 2025 15:00) (70/48 - 145/74)  BP(mean): 90 (27 Feb 2025 15:00) (55 - 100)  RR: 28 (27 Feb 2025 15:00) (16 - 29)  SpO2: 92% (27 Feb 2025 15:00) (91% - 100%)    Parameters below as of 27 Feb 2025 11:49  Patient On (Oxygen Delivery Method): ventilator      Mode: AC/ CMV (Assist Control/ Continuous Mandatory Ventilation)  RR (machine): 16  TV (machine): 450  FiO2: 40  PEEP: 8  ITime: 0.63  MAP: 13  PIP: 28    I&O's Summary    26 Feb 2025 07:01  -  27 Feb 2025 07:00  --------------------------------------------------------  IN: 3430.8 mL / OUT: 2920 mL / NET: 510.8 mL    27 Feb 2025 07:01  -  27 Feb 2025 16:00  --------------------------------------------------------  IN: 1149.4 mL / OUT: 960 mL / NET: 189.4 mL          Physical Exam:   GENERAL: NAD, well-groomed, well-developed  HEENT: BREE/   Atraumatic, Normocephalic  ENMT: No tonsillar erythema, exudates, or enlargement; Moist mucous membranes, Good dentition, No lesions  NECK: Supple, No JVD, Normal thyroid  CHEST/LUNG: Clear to auscultaion  CVS: Regular rate and rhythm; No murmurs, rubs, or gallops  GI: : Soft, Nontender, Nondistended; Bowel sounds present  NERVOUS SYSTEM:  intubated and sedated    EXTREMITIES: +edema  LYMPH: No lymphadenopathy noted  SKIN: No rashes or lesions  ENDOCRINOLOGY: No Thyromegaly  PSYCH: intubated and sedated      Labs:  ABG - ( 27 Feb 2025 00:14 )  pH, Arterial: 7.40  pH, Blood: x     /  pCO2: 40    /  pO2: 119   / HCO3: 25    / Base Excess: 0.0   /  SaO2: 98.5            40, 100, 24, 24                            7.5    6.50  )-----------( 104      ( 27 Feb 2025 00:24 )             23.4                         7.3    7.55  )-----------( 103      ( 26 Feb 2025 00:17 )             23.2                         7.4    9.17  )-----------( 98       ( 25 Feb 2025 00:59 )             24.0                         7.9    8.76  )-----------( 102      ( 23 Feb 2025 22:48 )             25.9                         8.1    10.78 )-----------( 107      ( 23 Feb 2025 16:19 )             26.4     02-27    140  |  95[L]  |  92[H]  ----------------------------<  179[H]  4.3   |  24  |  4.03[H]  02-27    142  |  98  |  87[H]  ----------------------------<  113[H]  3.9   |  22  |  4.08[H]  02-26    141  |  98  |  81[H]  ----------------------------<  187[H]  4.2   |  22  |  3.95[H]  02-26    142  |  99  |  77[H]  ----------------------------<  161[H]  3.8   |  25  |  4.03[H]  02-26    140  |  102  |  75[H]  ----------------------------<  142[H]  3.8   |  21[L]  |  4.01[H]  02-25    143  |  104  |  70[H]  ----------------------------<  177[H]  4.4   |  22  |  4.14[H]  02-25    146[H]  |  110[H]  |  67[H]  ----------------------------<  138[H]  3.5   |  19[L]  |  4.24[H]  02-24    147[H]  |  112[H]  |  64[H]  ----------------------------<  215[H]  4.1   |  20[L]  |  4.24[H]  02-24    147[H]  |  110[H]  |  63[H]  ----------------------------<  228[H]  4.4   |  20[L]  |  4.13[H]  02-23    147[H]  |  114[H]  |  60[H]  ----------------------------<  170[H]  4.2   |  19[L]  |  3.65[H]    Ca    9.2      27 Feb 2025 09:21  Ca    9.2      27 Feb 2025 00:24  Ca    9.6      26 Feb 2025 16:13  Ca    9.7      26 Feb 2025 07:53  Ca    9.5      26 Feb 2025 00:17  Phos  6.4     02-27  Phos  6.9     02-27  Phos  5.4     02-26  Phos  4.8     02-26  Phos  4.8     02-26  Mg     2.2     02-27  Mg     2.2     02-27  Mg     2.1     02-26  Mg     2.1     02-26  Mg     2.1     02-26    TPro  6.8  /  Alb  2.6[L]  /  TBili  0.3  /  DBili  x   /  AST  50[H]  /  ALT  46[H]  /  AlkPhos  153[H]  02-27  TPro  6.7  /  Alb  2.7[L]  /  TBili  0.4  /  DBili  x   /  AST  41[H]  /  ALT  39  /  AlkPhos  129[H]  02-25  TPro  6.0  /  Alb  2.4[L]  /  TBili  0.4  /  DBili  x   /  AST  40  /  ALT  30  /  AlkPhos  120  02-25  TPro  5.8[L]  /  Alb  2.5[L]  /  TBili  0.4  /  DBili  x   /  AST  32  /  ALT  24  /  AlkPhos  119  02-24  TPro  6.1  /  Alb  2.6[L]  /  TBili  0.6  /  DBili  x   /  AST  28  /  ALT  26  /  AlkPhos  120  02-24  TPro  5.7[L]  /  Alb  2.4[L]  /  TBili  0.4  /  DBili  x   /  AST  29  /  ALT  25  /  AlkPhos  116  02-23    CAPILLARY BLOOD GLUCOSE          LIVER FUNCTIONS - ( 27 Feb 2025 00:24 )  Alb: 2.6 g/dL / Pro: 6.8 g/dL / ALK PHOS: 153 U/L / ALT: 46 U/L / AST: 50 U/L / GGT: x           PT/INR - ( 27 Feb 2025 00:24 )   PT: 11.8 sec;   INR: 1.04 ratio         PTT - ( 27 Feb 2025 00:24 )  PTT:62.4 sec  Urinalysis Basic - ( 27 Feb 2025 09:21 )    Color: x / Appearance: x / SG: x / pH: x  Gluc: 179 mg/dL / Ketone: x  / Bili: x / Urobili: x   Blood: x / Protein: x / Nitrite: x   Leuk Esterase: x / RBC: x / WBC x   Sq Epi: x / Non Sq Epi: x / Bacteria: x      D-Dimer Assay, Quantitative: 2027 ng/mL DDU (02-23 @ 06:14)        RECENT CULTURES:  02-21 @ 19:36 .Blood Blood         rad< from: CT Chest No Cont (02.26.25 @ 22:32) >      IMPRESSION:  Since 2/17/2025, while the upper lobe groundglass opacities have   improved, there are new and worsening confluent areas of   consolidation/pneumonia in the mid to lower lungs.        --- End of Report ---          INESSA DAVID DO; Resident Radiologist  This document has been electronically signed.  SALONI CUELLAR M.D., Attending Radiologist    < end of copied text >         No growth at 5 days    02-21 @ 19:35 .Blood Blood                No growth at 5 days          RESPIRATORY CULTURES:          Studies  Chest X-RAY  CT SCAN Chest   Venous Dopplers: LE:   CT Abdomen  Others

## 2025-02-27 NOTE — PROGRESS NOTE ADULT - NSPROGADDITIONALINFOA_GEN_ALL_CORE
speech & swallow eval appreciated, s/p FEES: recommends thicken pureed diet.  events reviewed, RRT for hypoxia, intubated/sedated for increased work of breathing.   transferred to ICU. ICU eval appreciated  GOC discussed, full code per the sister.   Transferred to ICU, remain intubated.   c/w Bumex gtt for overload. weaned off pressors.   sedation vacation and weaning trial per ICU team.  d/w ID. on IV Zosyn. repeat CT chest as above.   ID f/u. ICU care appreciated.     - Dr. SHIRA Leroy ([facility])  - [phone]

## 2025-02-27 NOTE — PROGRESS NOTE ADULT - ASSESSMENT
68 y/o M with PMH of HTN, HLD, VAZQUEZ on CPAP and home O2 (2LNC daytime), CKD, epilepsy, schizophrenia developmental delay, metastatic testicular CA. Recent admission at [facility] for acute respiratory failure in the setting of seizures, influenza infection, pulmonary edema, PNA requiring intubation in MICU. Was eventually discharged to rehab. Presents now with SOB, found to be COVID + at facility on 2/13. Febrile on admission to ED to 101.9F. Placed on Bipap for increased WOB. Pulmonary called to consult for acute respiratory failure.

## 2025-02-27 NOTE — PROGRESS NOTE ADULT - ASSESSMENT
Patient is a 69 year old male with PMH of HTN, HLD, VAZQUEZ (on CPAP at bedtime, NC 2 liters during the day), CKD3, epilepsy, schizophrenia, developmental delay, metastatic testicular cancer (s/p orchiectomy, actively on chemotherapy, last dose of etoposide/cisplatin on 6/2024) presenting with worsening shortness of breath. Patient was recently hospitalized at [facility] from January 27th 2025 to February 6th 2025 for seizure and influenza virus infection, which required intubation. He was sent to rehab (originally from home) from hospital and now returns due to sudden onset shortness of breath and worsening oxygenation. Of note, he tested positive for COVID-19 at facility on 2/13/25 and was not put on any COVID-19 treatment at the time, only guaifenesin and scopolamine patch. Patient was placed on non-rebreather and sent to the hospital on 2/16/25.     Acute on chronic hypoxic respiratory failure  COVID-19 pneumonia, superimposed bacterial pneumonia  Acute on chronic HFpEF  SHAGUFTA on CKD  Course c/b severe sepsis, hypotension, SHAGUFTA, likely due to aspiration pneumonia   - noted initial discussion with patient/family and decided to hold off on remdesivir given LFTs and SHAGUFTA  - CT chest with extensive b/l ggo majority new since 1/30/25-c/w COVID pneumonia; tracheomalacia   - MRSA PCR screen negative, s/p vancomycin   - Ucx with Klebsiella, pt with no gu symptoms but covered by zosyn   - 2/16 Bcx negative   - 2/17 Scx with normal resp lucia   - 2/18 worsening resp status on NC requiring AVAPS, started on remdesivir   - 2/19 overnight fever, on HFNC, WBC remains wnl, UA neg, suspect fevers d/t COVID pneumonia   - 2/21 afebrile >24h, remains on HFNC/AVAPS, WBC wnl  - 2/22 completed remdesivir x5d course   - 2/23 s/p RRT for inc WOB, intubated and transferred to MICU, suspected aspiration   - 2/24 remains intubated, on sedation, pressor support, SHAGUFTA, WBC wnl   - 2/25 afebrile, off pressor, WBC wnl, Cr stable  - 2/26 CXR with improved aeration of b/l upper lobes; bibasilar hazy opacities; completed dexamethasone x10d    Recommendations:  Continue zosyn 3.375g IV Q8h (renally dosed) until 2/28  Nephrology following, monitor Cr   Diuresis as tolerated   Monitor temps/WBC  Supportive care  Aspiration precautions  Continue rest of care per primary team       Greyson Mart M.D.  [facility]  Available on [employer] TEAMS - *PREFERRED*  [phone]  After 5pm on weekdays and all day on weekends - please call [phone]     Thank you for consulting us and involving us in the management of this patients case. In addition to reviewing history, imaging, documents, labs, microbiology, took into account antibiotic stewardship, local antibiogram and infection control strategies and potential transmission issues.

## 2025-02-27 NOTE — PROGRESS NOTE ADULT - SUBJECTIVE AND OBJECTIVE BOX
[facility]   [phone]  RENAL FOLLOW UP NOTE  --------------------------------------------------------------------------------  Chief Complaint:    24 hour events/subjective:    seen earlier   intubated     PAST HISTORY  --------------------------------------------------------------------------------  No significant changes to PMH, PSH, FHx, SHx, unless otherwise noted    ALLERGIES & MEDICATIONS  --------------------------------------------------------------------------------  Allergies    seasonal allergies (Unknown)  penicillin (Hives)    Intolerances    latex (Rash)    Standing Inpatient Medications  albuterol/ipratropium for Nebulization 3 milliLiter(s) Nebulizer every 6 hours  atorvastatin 20 milliGRAM(s) Oral at bedtime  buMETAnide Infusion 4 mG/Hr IV Continuous <Continuous>  chlorhexidine 0.12% Liquid 15 milliLiter(s) Oral Mucosa every 12 hours  cholecalciferol 1000 Unit(s) Oral daily  escitalopram 15 milliGRAM(s) Oral with breakfast  gabapentin Solution 150 milliGRAM(s) Oral every 12 hours  heparin  Infusion.  Unit(s)/Hr IV Continuous <Continuous>  influenza  Vaccine (HIGH DOSE) 0.5 milliLiter(s) IntraMuscular once  lacosamide IVPB 200 milliGRAM(s) IV Intermittent every 12 hours  lamoTRIgine 25 milliGRAM(s) Oral two times a day  levETIRAcetam  IVPB 1000 milliGRAM(s) IV Intermittent <User Schedule>  lurasidone 40 milliGRAM(s) Oral at bedtime  naloxegol 12.5 milliGRAM(s) Oral daily  pantoprazole   Suspension 40 milliGRAM(s) Oral daily  piperacillin/tazobactam IVPB.. 3.375 Gram(s) IV Intermittent every 8 hours  polyethylene glycol 3350 17 Gram(s) Oral daily  propofol Infusion 10 MICROgram(s)/kG/Min IV Continuous <Continuous>  senna 2 Tablet(s) Oral at bedtime    PRN Inpatient Medications      REVIEW OF SYSTEMS  --------------------------------------------------------------------------------        VITALS/PHYSICAL EXAM  --------------------------------------------------------------------------------  T(C): 37.1 (02-27-25 @ 16:00), Max: 37.7 (02-27-25 @ 04:00)  HR: 94 (02-27-25 @ 18:00) (87 - 127)  BP: 129/68 (02-27-25 @ 18:00) (70/48 - 145/74)  RR: 27 (02-27-25 @ 18:00) (16 - 29)  SpO2: 93% (02-27-25 @ 18:00) (91% - 100%)  Wt(kg): --        02-26-25 @ 07:01  -  02-27-25 @ 07:00  --------------------------------------------------------  IN: 3430.8 mL / OUT: 2920 mL / NET: 510.8 mL    02-27-25 @ 07:01  -  02-27-25 @ 18:59  --------------------------------------------------------  IN: 1859.8 mL / OUT: 1645 mL / NET: 214.8 mL      Physical Exam:  	    Gen: ill appearing male, intubated   	Pulm: decrease bs, +coarse   	CV: No JVD. RRR, S1S2; no rub  	Abd: +BS, soft, nondistended  	: No suprapubic tenderness, external catheter  	UE: Warm, no cyanosis  no clubbing,  no edema;  	LE: Warm, no cyanosis  no clubbing, no edema    LABS/STUDIES  --------------------------------------------------------------------------------              7.5    6.50  >-----------<  104      [02-27-25 @ 00:24]              23.4     140  |  95  |  92  ----------------------------<  179      [02-27-25 @ 09:21]  4.3   |  24  |  4.03        Ca     9.2     [02-27-25 @ 09:21]      Mg     2.2     [02-27-25 @ 09:21]      Phos  6.4     [02-27-25 @ 09:21]    TPro  6.8  /  Alb  2.6  /  TBili  0.3  /  DBili  x   /  AST  50  /  ALT  46  /  AlkPhos  153  [02-27-25 @ 00:24]    PT/INR: PT 11.8 , INR 1.04       [02-27-25 @ 00:24]  PTT: 62.4       [02-27-25 @ 00:24]    Uric acid 8.6      [02-27-25 @ 09:21]    Creatinine Trend:  SCr 4.03 [02-27 @ 09:21]  SCr 4.08 [02-27 @ 00:24]  SCr 3.95 [02-26 @ 16:13]  SCr 4.03 [02-26 @ 07:53]  SCr 4.01 [02-26 @ 00:17]

## 2025-02-27 NOTE — PROGRESS NOTE ADULT - SUBJECTIVE AND OBJECTIVE BOX
DATE OF SERVICE: 02-27-25 @ 15:49    Patient is a 69y old  Male who presents with a chief complaint of Acute on chronic hypoxemic respiratory failure (27 Feb 2025 08:43)      INTERVAL HISTORY: Feels ok.     REVIEW OF SYSTEMS:  CONSTITUTIONAL: No weakness  EYES/ENT: No visual changes;  No throat pain   NECK: No pain or stiffness  RESPIRATORY: No cough, wheezing; No shortness of breath  CARDIOVASCULAR: No chest pain or palpitations  GASTROINTESTINAL: No abdominal  pain. No nausea, vomiting, or hematemesis  GENITOURINARY: No dysuria, frequency or hematuria  NEUROLOGICAL: No stroke like symptoms  SKIN: No rashes    TELEMETRY Personally reviewed: SR   	  MEDICATIONS:  buMETAnide Infusion 2 mG/Hr IV Continuous <Continuous>        PHYSICAL EXAM:  T(C): 37.4 (02-27-25 @ 12:00), Max: 37.7 (02-27-25 @ 04:00)  HR: 91 (02-27-25 @ 15:00) (87 - 127)  BP: 124/66 (02-27-25 @ 15:00) (70/48 - 145/74)  RR: 28 (02-27-25 @ 15:00) (16 - 29)  SpO2: 92% (02-27-25 @ 15:00) (91% - 100%)  Wt(kg): --  I&O's Summary    26 Feb 2025 07:01  -  27 Feb 2025 07:00  --------------------------------------------------------  IN: 3430.8 mL / OUT: 2920 mL / NET: 510.8 mL    27 Feb 2025 07:01  -  27 Feb 2025 15:49  --------------------------------------------------------  IN: 1149.4 mL / OUT: 960 mL / NET: 189.4 mL          Appearance: In no distress	  HEENT:    PERRL, EOMI	  Cardiovascular:  S1 S2, No JVD  Respiratory: Lungs clear to auscultation	  Gastrointestinal:  Soft, Non-tender, + BS	  Vascularature:  No edema of LE  Psychiatric: Appropriate affect   Neuro: no acute focal deficits                               7.5    6.50  )-----------( 104      ( 27 Feb 2025 00:24 )             23.4     02-27    140  |  95[L]  |  92[H]  ----------------------------<  179[H]  4.3   |  24  |  4.03[H]    Ca    9.2      27 Feb 2025 09:21  Phos  6.4     02-27  Mg     2.2     02-27    TPro  6.8  /  Alb  2.6[L]  /  TBili  0.3  /  DBili  x   /  AST  50[H]  /  ALT  46[H]  /  AlkPhos  153[H]  02-27        Labs personally reviewed      ASSESSMENT/PLAN: 	      69 year old male with a past medical history of hypertension, hyperlipemia VAZQUEZ (on CPAP at bedtime, NC 2 liters during the day), CKD stage 3, epilepsy, schizophrenia, developmental delay, metastatic testicular cancer (s/p orchiectomy, actively on chemotherapy, last dose of etoposide/cisplatin on 6/2024), presenting with acute on chronic hypoxemic respiratory failure, secondary to (a) COVID-19 infection, (b) superimposed pneumonia after recent influenza infection, and/or (c) fluid overload.     Problem/Plan - 1:  ·  Problem: Acute on chronic respiratory failure with hypoxia.   ·  Plan: Likely 2/2 PNA, HF  - Appreciate pulmonology.  - Transferred to MICU for hypoxia. Appreciate MICU care.   - 2/24 started on Bumex gtt at 2mg/hr  - 2/26 No appreciable JVD or peripheral edema. CXR with bibasilar hazy opacities. BP now soft with labile but stable Cr. Does not appear to be a HF. Recommend de-escalation of diuretics. CT Chest shows PNA picture.     Problem/Plan - 2:  ·  Problem: SHAGUFTA (acute kidney injury).   ·  Plan: Has a history of CKD stage 3, Cr 2.38 at baseline. Presents with Cr 3.27.   - Nephrology following  - Will trend closely while on Bumex gtt     Problem/Plan - 3:  ·  Problem: Chronic heart failure with preserved ejection fraction.   ·  Plan: TTE done here 1/17/25 technically difficult, but LV systolic fxn appears nl without any regional wall motion abnormalities  - Repeat TTE pending  - BNP 5000 <---1100 but appears euvolemic   - 2/24 decreased UOP and net positive, bumex gtt started at 2mg/hr for goal -1-2 L,  per MICU     Problem/Plan - 4:  ·  Problem: New onset Afib RVR (on tele, confirmed with EKG 2/19)   ·  Plan: Started metoprolol 5mg IVP q6 hours & Hep gtt  - If rate remains uncontrolled, can start Cardizem drip at 5mg/h  - Initiated hep gtt and will transition to Eliquis     Problem/Plan - 5:  ·  Problem: HTN    ·  Plan: Start Hydral 25mg TID & amlodipine 5mg for uncontrolled BP (2/19)  - improved  - Hold BP meds for shock state. Can resume when improved.     Problem/Plan - 6:  ·  Problem: Prophylactic measure.   ·  Plan:  DVT prophylaxis: heparin gtt      REILLY August-DARIAN Andrew, DO [facility]  Cardiovascular Medicine  [address], [address]  Available through call or text on [employer] TEAM[employer]  Office: [phone]   DATE OF SERVICE: 02-27-25 @ 15:49    Patient is a 69y old  Male who presents with a chief complaint of Acute on chronic hypoxemic respiratory failure (27 Feb 2025 08:43)      INTERVAL HISTORY: Feels ok.     REVIEW OF SYSTEMS:  CONSTITUTIONAL: No weakness  EYES/ENT: No visual changes;  No throat pain   NECK: No pain or stiffness  RESPIRATORY: No cough, wheezing; No shortness of breath  CARDIOVASCULAR: No chest pain or palpitations  GASTROINTESTINAL: No abdominal  pain. No nausea, vomiting, or hematemesis  GENITOURINARY: No dysuria, frequency or hematuria  NEUROLOGICAL: No stroke like symptoms  SKIN: No rashes    TELEMETRY Personally reviewed: SR   	  MEDICATIONS:  buMETAnide Infusion 2 mG/Hr IV Continuous <Continuous>        PHYSICAL EXAM:  T(C): 37.4 (02-27-25 @ 12:00), Max: 37.7 (02-27-25 @ 04:00)  HR: 91 (02-27-25 @ 15:00) (87 - 127)  BP: 124/66 (02-27-25 @ 15:00) (70/48 - 145/74)  RR: 28 (02-27-25 @ 15:00) (16 - 29)  SpO2: 92% (02-27-25 @ 15:00) (91% - 100%)  Wt(kg): --  I&O's Summary    26 Feb 2025 07:01  -  27 Feb 2025 07:00  --------------------------------------------------------  IN: 3430.8 mL / OUT: 2920 mL / NET: 510.8 mL    27 Feb 2025 07:01  -  27 Feb 2025 15:49  --------------------------------------------------------  IN: 1149.4 mL / OUT: 960 mL / NET: 189.4 mL          Appearance: In no distress	  HEENT:    PERRL, EOMI	  Cardiovascular:  S1 S2, No JVD  Respiratory: Lungs clear to auscultation	  Gastrointestinal:  Soft, Non-tender, + BS	  Vascularature:  No edema of LE  Psychiatric: Appropriate affect   Neuro: no acute focal deficits                               7.5    6.50  )-----------( 104      ( 27 Feb 2025 00:24 )             23.4     02-27    140  |  95[L]  |  92[H]  ----------------------------<  179[H]  4.3   |  24  |  4.03[H]    Ca    9.2      27 Feb 2025 09:21  Phos  6.4     02-27  Mg     2.2     02-27    TPro  6.8  /  Alb  2.6[L]  /  TBili  0.3  /  DBili  x   /  AST  50[H]  /  ALT  46[H]  /  AlkPhos  153[H]  02-27        Labs personally reviewed      ASSESSMENT/PLAN: 	      69 year old male with a past medical history of hypertension, hyperlipemia VAZQUEZ (on CPAP at bedtime, NC 2 liters during the day), CKD stage 3, epilepsy, schizophrenia, developmental delay, metastatic testicular cancer (s/p orchiectomy, actively on chemotherapy, last dose of etoposide/cisplatin on 6/2024), presenting with acute on chronic hypoxemic respiratory failure, secondary to (a) COVID-19 infection, (b) superimposed pneumonia after recent influenza infection, and/or (c) fluid overload.     Problem/Plan - 1:  ·  Problem: Acute on chronic respiratory failure with hypoxia.   ·  Plan: Likely 2/2 PNA, HF  - Appreciate pulmonology.  - Transferred to MICU for hypoxia. Appreciate MICU care.   - 2/24 started on Bumex gtt at 2mg/hr  - 2/26 No appreciable JVD or peripheral edema. BP now soft with labile but stable Cr. Does not appear to be a HF. Recommend de-escalation of diuretics.   --- 2/26 CT Chest shows new and worsening confluent areas of consolidation/pneumonia in the mid to lower lungs.      Problem/Plan - 2:  ·  Problem: SHAGUFTA (acute kidney injury).   ·  Plan: Has a history of CKD stage 3, Cr 2.38 at baseline. Presents with Cr 3.27.   - Nephrology following  - Will trend closely while on Bumex gtt     Problem/Plan - 3:  ·  Problem: Chronic heart failure with preserved ejection fraction.   ·  Plan: TTE done here 1/17/25 technically difficult, but LV systolic fxn appears nl without any regional wall motion abnormalities  - Repeat TTE pending  - BNP 5000 <---1100 but appears euvolemic   - 2/24 decreased UOP and net positive, bumex gtt started at 2mg/hr for goal -1-2 L,  per MICU     Problem/Plan - 4:  ·  Problem: New onset Afib RVR (on tele, confirmed with EKG 2/19)   ·  Plan: Started metoprolol 5mg IVP q6 hours & Hep gtt  - If rate remains uncontrolled, can start Cardizem drip at 5mg/h  - Initiated hep gtt and will transition to Eliquis     Problem/Plan - 5:  ·  Problem: HTN    ·  Plan: Start Hydral 25mg TID & amlodipine 5mg for uncontrolled BP (2/19)  - improved  - Hold BP meds for shock state. Can resume when improved.     Problem/Plan - 6:  ·  Problem: Prophylactic measure.   ·  Plan:  DVT prophylaxis: heparin gtt      Magaly Laird, REILLY-DARIAN Andrew DO [facility]  Cardiovascular Medicine  [address], [address]  Available through call or text on [employer] TEAM[employer]  Office: [phone]

## 2025-02-28 LAB
ALBUMIN SERPL ELPH-MCNC: 2.9 G/DL — LOW (ref 3.3–5)
ALP SERPL-CCNC: 166 U/L — HIGH (ref 40–120)
ALT FLD-CCNC: 54 U/L — HIGH (ref 10–45)
ANION GAP SERPL CALC-SCNC: 22 MMOL/L — HIGH (ref 5–17)
ANION GAP SERPL CALC-SCNC: 24 MMOL/L — HIGH (ref 5–17)
APTT BLD: 57.9 SEC — HIGH (ref 24.5–35.6)
APTT BLD: 59.6 SEC — HIGH (ref 24.5–35.6)
APTT BLD: 65.5 SEC — HIGH (ref 24.5–35.6)
AST SERPL-CCNC: 50 U/L — HIGH (ref 10–40)
BASOPHILS # BLD AUTO: 0.01 K/UL — SIGNIFICANT CHANGE UP (ref 0–0.2)
BASOPHILS NFR BLD AUTO: 0.1 % — SIGNIFICANT CHANGE UP (ref 0–2)
BILIRUB SERPL-MCNC: 0.3 MG/DL — SIGNIFICANT CHANGE UP (ref 0.2–1.2)
BLD GP AB SCN SERPL QL: POSITIVE — SIGNIFICANT CHANGE UP
BUN SERPL-MCNC: 104 MG/DL — HIGH (ref 7–23)
BUN SERPL-MCNC: 98 MG/DL — HIGH (ref 7–23)
CALCIUM SERPL-MCNC: 9 MG/DL — SIGNIFICANT CHANGE UP (ref 8.4–10.5)
CALCIUM SERPL-MCNC: 9.1 MG/DL — SIGNIFICANT CHANGE UP (ref 8.4–10.5)
CHLORIDE SERPL-SCNC: 92 MMOL/L — LOW (ref 96–108)
CHLORIDE SERPL-SCNC: 95 MMOL/L — LOW (ref 96–108)
CO2 SERPL-SCNC: 21 MMOL/L — LOW (ref 22–31)
CO2 SERPL-SCNC: 21 MMOL/L — LOW (ref 22–31)
CREAT SERPL-MCNC: 3.89 MG/DL — HIGH (ref 0.5–1.3)
CREAT SERPL-MCNC: 4.01 MG/DL — HIGH (ref 0.5–1.3)
EGFR: 16 ML/MIN/1.73M2 — LOW
EOSINOPHIL # BLD AUTO: 0.13 K/UL — SIGNIFICANT CHANGE UP (ref 0–0.5)
EOSINOPHIL NFR BLD AUTO: 1.6 % — SIGNIFICANT CHANGE UP (ref 0–6)
GAS PNL BLDA: SIGNIFICANT CHANGE UP
GLUCOSE BLDC GLUCOMTR-MCNC: 185 MG/DL — HIGH (ref 70–99)
GLUCOSE SERPL-MCNC: 127 MG/DL — HIGH (ref 70–99)
GLUCOSE SERPL-MCNC: 176 MG/DL — HIGH (ref 70–99)
HCT VFR BLD CALC: 23.9 % — LOW (ref 39–50)
HGB BLD-MCNC: 7.5 G/DL — LOW (ref 13–17)
IMM GRANULOCYTES NFR BLD AUTO: 4.9 % — HIGH (ref 0–0.9)
INR BLD: 0.96 RATIO — SIGNIFICANT CHANGE UP (ref 0.85–1.16)
LYMPHOCYTES # BLD AUTO: 1.06 K/UL — SIGNIFICANT CHANGE UP (ref 1–3.3)
LYMPHOCYTES # BLD AUTO: 12.8 % — LOW (ref 13–44)
MAGNESIUM SERPL-MCNC: 2.2 MG/DL — SIGNIFICANT CHANGE UP (ref 1.6–2.6)
MAGNESIUM SERPL-MCNC: 2.2 MG/DL — SIGNIFICANT CHANGE UP (ref 1.6–2.6)
MCHC RBC-ENTMCNC: 30.9 PG — SIGNIFICANT CHANGE UP (ref 27–34)
MCHC RBC-ENTMCNC: 31.4 G/DL — LOW (ref 32–36)
MCV RBC AUTO: 98.4 FL — SIGNIFICANT CHANGE UP (ref 80–100)
MONOCYTES # BLD AUTO: 0.57 K/UL — SIGNIFICANT CHANGE UP (ref 0–0.9)
MONOCYTES NFR BLD AUTO: 6.9 % — SIGNIFICANT CHANGE UP (ref 2–14)
NEUTROPHILS # BLD AUTO: 6.13 K/UL — SIGNIFICANT CHANGE UP (ref 1.8–7.4)
NEUTROPHILS NFR BLD AUTO: 73.7 % — SIGNIFICANT CHANGE UP (ref 43–77)
NRBC BLD AUTO-RTO: 1 /100 WBCS — HIGH (ref 0–0)
PHOSPHATE SERPL-MCNC: 7 MG/DL — HIGH (ref 2.5–4.5)
PHOSPHATE SERPL-MCNC: 7.9 MG/DL — HIGH (ref 2.5–4.5)
PLATELET # BLD AUTO: 104 K/UL — LOW (ref 150–400)
POTASSIUM SERPL-MCNC: 3.5 MMOL/L — SIGNIFICANT CHANGE UP (ref 3.5–5.3)
POTASSIUM SERPL-MCNC: 3.6 MMOL/L — SIGNIFICANT CHANGE UP (ref 3.5–5.3)
POTASSIUM SERPL-SCNC: 3.5 MMOL/L — SIGNIFICANT CHANGE UP (ref 3.5–5.3)
POTASSIUM SERPL-SCNC: 3.6 MMOL/L — SIGNIFICANT CHANGE UP (ref 3.5–5.3)
PROT SERPL-MCNC: 7.3 G/DL — SIGNIFICANT CHANGE UP (ref 6–8.3)
PROTHROM AB SERPL-ACNC: 11.1 SEC — SIGNIFICANT CHANGE UP (ref 9.9–13.4)
RBC # BLD: 2.43 M/UL — LOW (ref 4.2–5.8)
RBC # FLD: 15.1 % — HIGH (ref 10.3–14.5)
RH IG SCN BLD-IMP: POSITIVE — SIGNIFICANT CHANGE UP
SODIUM SERPL-SCNC: 137 MMOL/L — SIGNIFICANT CHANGE UP (ref 135–145)
SODIUM SERPL-SCNC: 138 MMOL/L — SIGNIFICANT CHANGE UP (ref 135–145)
WBC # BLD: 8.31 K/UL — SIGNIFICANT CHANGE UP (ref 3.8–10.5)
WBC # FLD AUTO: 8.31 K/UL — SIGNIFICANT CHANGE UP (ref 3.8–10.5)

## 2025-02-28 PROCEDURE — 86077 PHYS BLOOD BANK SERV XMATCH: CPT

## 2025-02-28 PROCEDURE — 99291 CRITICAL CARE FIRST HOUR: CPT

## 2025-02-28 RX ORDER — BUMETANIDE 1 MG/1
1 TABLET ORAL
Qty: 20 | Refills: 0 | Status: DISCONTINUED | OUTPATIENT
Start: 2025-02-28 | End: 2025-03-02

## 2025-02-28 RX ORDER — IPRATROPIUM BROMIDE AND ALBUTEROL SULFATE .5; 2.5 MG/3ML; MG/3ML
3 SOLUTION RESPIRATORY (INHALATION) EVERY 6 HOURS
Refills: 0 | Status: DISCONTINUED | OUTPATIENT
Start: 2025-02-28 | End: 2025-03-28

## 2025-02-28 RX ORDER — PIPERACILLIN-TAZO-DEXTROSE,ISO 3.375G/5
3.38 IV SOLUTION, PIGGYBACK PREMIX FROZEN(ML) INTRAVENOUS EVERY 12 HOURS
Refills: 0 | Status: COMPLETED | OUTPATIENT
Start: 2025-02-28 | End: 2025-02-28

## 2025-02-28 RX ORDER — DEXMEDETOMIDINE HYDROCHLORIDE IN SODIUM CHLORIDE 4 UG/ML
0.2 INJECTION INTRAVENOUS
Qty: 200 | Refills: 0 | Status: DISCONTINUED | OUTPATIENT
Start: 2025-02-28 | End: 2025-03-01

## 2025-02-28 RX ORDER — LEVETIRACETAM 10 MG/ML
750 INJECTION, SOLUTION INTRAVENOUS EVERY 12 HOURS
Refills: 0 | Status: DISCONTINUED | OUTPATIENT
Start: 2025-02-28 | End: 2025-03-13

## 2025-02-28 RX ORDER — IPRATROPIUM BROMIDE AND ALBUTEROL SULFATE .5; 2.5 MG/3ML; MG/3ML
3 SOLUTION RESPIRATORY (INHALATION) EVERY 6 HOURS
Refills: 0 | Status: DISCONTINUED | OUTPATIENT
Start: 2025-02-28 | End: 2025-02-28

## 2025-02-28 RX ADMIN — NALOXEGOL OXALATE 12.5 MILLIGRAM(S): 12.5 TABLET, FILM COATED ORAL at 11:16

## 2025-02-28 RX ADMIN — IPRATROPIUM BROMIDE AND ALBUTEROL SULFATE 3 MILLILITER(S): .5; 2.5 SOLUTION RESPIRATORY (INHALATION) at 11:06

## 2025-02-28 RX ADMIN — GABAPENTIN 150 MILLIGRAM(S): 400 CAPSULE ORAL at 05:01

## 2025-02-28 RX ADMIN — Medication 4 MILLILITER(S): at 17:14

## 2025-02-28 RX ADMIN — IPRATROPIUM BROMIDE AND ALBUTEROL SULFATE 3 MILLILITER(S): .5; 2.5 SOLUTION RESPIRATORY (INHALATION) at 23:22

## 2025-02-28 RX ADMIN — PROPOFOL 5.62 MICROGRAM(S)/KG/MIN: 10 INJECTION, EMULSION INTRAVENOUS at 01:06

## 2025-02-28 RX ADMIN — ESCITALOPRAM OXALATE 15 MILLIGRAM(S): 20 TABLET ORAL at 11:16

## 2025-02-28 RX ADMIN — HEPARIN SODIUM UNIT(S)/HR: 1000 INJECTION INTRAVENOUS; SUBCUTANEOUS at 08:22

## 2025-02-28 RX ADMIN — HEPARIN SODIUM UNIT(S)/HR: 1000 INJECTION INTRAVENOUS; SUBCUTANEOUS at 00:59

## 2025-02-28 RX ADMIN — LURASIDONE HYDROCHLORIDE 40 MILLIGRAM(S): 120 TABLET, FILM COATED ORAL at 21:25

## 2025-02-28 RX ADMIN — BUMETANIDE 20 MG/HR: 1 TABLET ORAL at 01:06

## 2025-02-28 RX ADMIN — LAMOTRIGINE 25 MILLIGRAM(S): 150 TABLET ORAL at 05:01

## 2025-02-28 RX ADMIN — Medication 4 MILLILITER(S): at 05:22

## 2025-02-28 RX ADMIN — BUMETANIDE 20 MG/HR: 1 TABLET ORAL at 11:16

## 2025-02-28 RX ADMIN — LACOSAMIDE 140 MILLIGRAM(S): 150 TABLET, FILM COATED ORAL at 05:01

## 2025-02-28 RX ADMIN — Medication 25 GRAM(S): at 05:01

## 2025-02-28 RX ADMIN — IPRATROPIUM BROMIDE AND ALBUTEROL SULFATE 3 MILLILITER(S): .5; 2.5 SOLUTION RESPIRATORY (INHALATION) at 17:14

## 2025-02-28 RX ADMIN — DEXMEDETOMIDINE HYDROCHLORIDE IN SODIUM CHLORIDE 4.68 MICROGRAM(S)/KG/HR: 4 INJECTION INTRAVENOUS at 17:19

## 2025-02-28 RX ADMIN — BUMETANIDE 20 MG/HR: 1 TABLET ORAL at 05:15

## 2025-02-28 RX ADMIN — Medication 15 MILLILITER(S): at 05:01

## 2025-02-28 RX ADMIN — Medication 25 GRAM(S): at 17:10

## 2025-02-28 RX ADMIN — LACOSAMIDE 140 MILLIGRAM(S): 150 TABLET, FILM COATED ORAL at 17:19

## 2025-02-28 RX ADMIN — Medication 667 MILLIGRAM(S): at 13:20

## 2025-02-28 RX ADMIN — IPRATROPIUM BROMIDE AND ALBUTEROL SULFATE 3 MILLILITER(S): .5; 2.5 SOLUTION RESPIRATORY (INHALATION) at 05:09

## 2025-02-28 RX ADMIN — Medication 667 MILLIGRAM(S): at 21:25

## 2025-02-28 RX ADMIN — Medication 40 MILLIGRAM(S): at 11:16

## 2025-02-28 RX ADMIN — Medication 15 MILLILITER(S): at 17:10

## 2025-02-28 RX ADMIN — ATORVASTATIN CALCIUM 20 MILLIGRAM(S): 80 TABLET, FILM COATED ORAL at 21:24

## 2025-02-28 RX ADMIN — LEVETIRACETAM 750 MILLIGRAM(S): 10 INJECTION, SOLUTION INTRAVENOUS at 17:11

## 2025-02-28 RX ADMIN — GABAPENTIN 150 MILLIGRAM(S): 400 CAPSULE ORAL at 17:11

## 2025-02-28 RX ADMIN — Medication 4 MILLILITER(S): at 23:22

## 2025-02-28 RX ADMIN — LEVETIRACETAM 400 MILLIGRAM(S): 10 INJECTION, SOLUTION INTRAVENOUS at 05:15

## 2025-02-28 RX ADMIN — Medication 1000 UNIT(S): at 11:16

## 2025-02-28 RX ADMIN — Medication 2 TABLET(S): at 21:25

## 2025-02-28 RX ADMIN — Medication 4 MILLILITER(S): at 11:05

## 2025-02-28 RX ADMIN — LAMOTRIGINE 25 MILLIGRAM(S): 150 TABLET ORAL at 17:10

## 2025-02-28 NOTE — PROGRESS NOTE ADULT - SUBJECTIVE AND OBJECTIVE BOX
[facility]   [phone]  RENAL FOLLOW UP NOTE  --------------------------------------------------------------------------------  Chief Complaint:    24 hour events/subjective:    seen earlier intubated     PAST HISTORY  --------------------------------------------------------------------------------  No significant changes to PMH, PSH, FHx, SHx, unless otherwise noted    ALLERGIES & MEDICATIONS  --------------------------------------------------------------------------------  Allergies    seasonal allergies (Unknown)  penicillin (Hives)    Intolerances    latex (Rash)    Standing Inpatient Medications  albuterol/ipratropium for Nebulization 3 milliLiter(s) Nebulizer every 6 hours  atorvastatin 20 milliGRAM(s) Oral at bedtime  buMETAnide Infusion 1 mG/Hr IV Continuous <Continuous>  calcium acetate 667 milliGRAM(s) Oral every 8 hours  chlorhexidine 0.12% Liquid 15 milliLiter(s) Oral Mucosa every 12 hours  cholecalciferol 1000 Unit(s) Oral daily  dexMEDEtomidine Infusion 0.2 MICROgram(s)/kG/Hr IV Continuous <Continuous>  escitalopram 15 milliGRAM(s) Oral with breakfast  gabapentin Solution 150 milliGRAM(s) Oral every 12 hours  heparin  Infusion.  Unit(s)/Hr IV Continuous <Continuous>  influenza  Vaccine (HIGH DOSE) 0.5 milliLiter(s) IntraMuscular once  lacosamide IVPB 200 milliGRAM(s) IV Intermittent every 12 hours  lamoTRIgine 25 milliGRAM(s) Oral two times a day  levETIRAcetam   Injectable 750 milliGRAM(s) IV Push every 12 hours  lurasidone 40 milliGRAM(s) Oral at bedtime  naloxegol 12.5 milliGRAM(s) Oral daily  pantoprazole   Suspension 40 milliGRAM(s) Oral daily  piperacillin/tazobactam IVPB.. 3.375 Gram(s) IV Intermittent every 12 hours  polyethylene glycol 3350 17 Gram(s) Oral daily  senna 2 Tablet(s) Oral at bedtime  sodium chloride 3%  Inhalation 4 milliLiter(s) Inhalation every 6 hours    PRN Inpatient Medications      REVIEW OF SYSTEMS  --------------------------------------------------------------------------------      VITALS/PHYSICAL EXAM  --------------------------------------------------------------------------------  T(C): 37.5 (02-28-25 @ 12:00), Max: 37.6 (02-28-25 @ 00:00)  HR: 100 (02-28-25 @ 16:19) (84 - 100)  BP: 119/65 (02-28-25 @ 15:00) (94/51 - 134/76)  RR: 27 (02-28-25 @ 15:07) (25 - 29)  SpO2: 99% (02-28-25 @ 16:19) (91% - 99%)  Wt(kg): --        02-27-25 @ 07:01  -  02-28-25 @ 07:00  --------------------------------------------------------  IN: 3763.2 mL / OUT: 3080 mL / NET: 683.2 mL    02-28-25 @ 07:01  -  02-28-25 @ 16:25  --------------------------------------------------------  IN: 758 mL / OUT: 650 mL / NET: 108 mL      Physical Exam:  	    Gen: ill appearing male, intubated   	Pulm: decrease bs, +coarse   	CV: No JVD. RRR, S1S2; no rub  	Abd: +BS, soft, nondistended  	: No suprapubic tenderness, external catheter  	UE: Warm, no cyanosis  no clubbing,  no edema;  	LE: Warm, no cyanosis  no clubbing, no edema    LABS/STUDIES  --------------------------------------------------------------------------------              7.5    8.31  >-----------<  104      [02-28-25 @ 00:29]              23.9     137  |  92  |  104  ----------------------------<  176      [02-28-25 @ 12:33]  3.6   |  21  |  3.89        Ca     9.0     [02-28-25 @ 12:33]      Mg     2.2     [02-28-25 @ 12:33]      Phos  7.9     [02-28-25 @ 12:33]    TPro  7.3  /  Alb  2.9  /  TBili  0.3  /  DBili  x   /  AST  50  /  ALT  54  /  AlkPhos  166  [02-28-25 @ 00:29]    PT/INR: PT 11.1 , INR 0.96       [02-28-25 @ 00:29]  PTT: 65.5       [02-28-25 @ 12:33]    Uric acid 8.6      [02-27-25 @ 09:21]    Creatinine Trend:  SCr 3.89 [02-28 @ 12:33]  SCr 4.01 [02-28 @ 00:29]  SCr 4.03 [02-27 @ 09:21]  SCr 4.08 [02-27 @ 00:24]  SCr 3.95 [02-26 @ 16:13]          Ferritin [id number]      [02-23-25 @ 06:15]  TSH 0.87      [01-25-25 @ 11:31]

## 2025-02-28 NOTE — PROGRESS NOTE ADULT - ASSESSMENT
70 y/o M with PMH of HTN, HLD, VAZQUEZ on CPAP and home O2 (2LNC daytime), CKD, epilepsy, schizophrenia developmental delay, metastatic testicular CA. Recent admission at [facility] for acute respiratory failure in the setting of seizures, influenza infection, pulmonary edema, PNA requiring intubation in MICU. Was eventually discharged to rehab. Presents now with SOB, found to be COVID + at facility on 2/13. Febrile on admission to ED to 101.9F. Placed on Bipap for increased WOB. Pulmonary called to consult for acute respiratory failure.

## 2025-02-28 NOTE — PROGRESS NOTE ADULT - ASSESSMENT
69 year old male with a past medical history of hypertension, hyperlipemia VAZQUEZ (on CPAP at bedtime, NC 2 liters during the day), CKD stage 3, epilepsy, schizophrenia, developmental delay, metastatic testicular cancer (s/p orchiectomy, actively on chemotherapy, last dose of etoposide/cisplatin on 6/2024), presenting with acute on chronic hypoxemic respiratory failure, secondary to (a) COVID-19 infection, (b) superimposed pneumonia after recent influenza infection, and/or (c) fluid overload.     MICU consulted and accepted after RRT due to increased work of breathing    NEURO:  #Mental status:  - Sedated, however, was AAOx2 prior to increased work of breathing that required intubation  - continue with propofol, wean as tolerated    #Epilepsy:  - Continue with home meds which include Lacosamide, Lurasidone, Lamotrigine - lamotrigine restarted 2/24 after being held on 2/18, per pharmacy restarted at 25 BID, uptitrate over weeks    #Brain Mets:  - MRI brain now with 5.4 mm enhancing lesion in the LEFT middle cerebellar peduncle with associated edema suspicious for metastases    CV:  #Echo:  Recent echo last admission showing Left ventricular cavity is normal in size. Left ventricular systolic function is normal with an ejection fraction of 62 %. There are no regional wall motion abnormalities seen. Normal right ventricular cavity size and normal right ventricular systolic function.   - Repeat echo continuing to show EF 70%    #Afib w/RVR  - New onset Afib RVR (on tele, confirmed with EKG 2/19)   - Plan: Started metoprolol 5mg IVP q6 hours & Hep gtt -> metoprolol held iso hypotension  - If rate remains uncontrolled, can start Cardizem drip at 5mg/h  - c/w Heparin drip    #HTN:  - On Hydralazine 25mg TID, Amlodipine 5mg, held iso current hypotension    PULM:  #Vent   - Due to increased work of breathing, now intubated  - SBT with 12/10 today    #AHRF  - Patient admitted to the hospital for AHRF, found to be COVID (+)  - Likely 2/2 PNA, does not appear to CHF decompensation  - Diuretics currently being held  - Continue with airway clearance regimen which includes duoneb,   - CT chest with b/l GGOs, possible early COVID fibrosis, area of consolidation ?infectious vs. atelectasis    RENAL:  #SHAGUFTA:   - Hx of CKD stage 3, Cr 2.38 at baseline, seems to be plateauing at 4.24  - In setting of COVID (+)  - Pre-renal, FeUrea 29%  - Monitor Cr  - Avoid nephrotoxic meds  - Wakefield in place  - Bumex 2 gtt, will increase if pt not net negative  - Monitor BMP q12      #Electrolyte abnormalities  Hypernatremia, resolved  - Continue to monitor  - Nephro recs appreciated    GI:  No acute issues     #Diet: Tube feeds via OG tube  #Bowel Regimen  - On Senna and Miralax    ENDO:  No acute issues  Thyroid nodule noted in prior notes    HEMATOLOGIC:  #Thrombocytopenic  - Unclear origin  - Concern for possible mets to bone marrow? vs due to infection  - Continue to monitor  - Transfuse to maintain Plt>10K unless actively bleeding then maintain >50K  - Heme-Onc recs appreciated    #DVT prophylaxis   - SCD    ID:  #COVID-19  - Concern for possible superimposed PNA   - Continue zosyn 2/28  - S/p Remdesivir  - Blood cx from 2 days ago negative    SKIN:  #Lines:  2x PIV    Ethics:  - Full Code  - Contact: Sister Ester [phone]  69 year old male with a past medical history of hypertension, hyperlipemia VAZQUEZ (on CPAP at bedtime, NC 2 liters during the day), CKD stage 3, epilepsy, schizophrenia, developmental delay, metastatic testicular cancer (s/p orchiectomy, actively on chemotherapy, last dose of etoposide/cisplatin on 6/2024), presenting with acute on chronic hypoxemic respiratory failure, secondary to (a) COVID-19 infection, (b) superimposed pneumonia after recent influenza infection, and/or (c) fluid overload.     MICU consulted and accepted after RRT due to increased work of breathing    NEURO:  #Mental status:  - Sedated, however, was AAOx2 prior to increased work of breathing that required intubation  - continue with propofol, wean as tolerated    #Epilepsy:  - Continue with home meds which include Lacosamide, Lurasidone, Lamotrigine - lamotrigine restarted 2/24 after being held on 2/18, per pharmacy restarted at 25 BID, uptitrate over weeks, keppra decreased to home dose    #Brain Mets:  - MRI brain now with 5.4 mm enhancing lesion in the LEFT middle cerebellar peduncle with associated edema suspicious for metastases    CV:  #Echo:  Recent echo last admission showing Left ventricular cavity is normal in size. Left ventricular systolic function is normal with an ejection fraction of 62 %. There are no regional wall motion abnormalities seen. Normal right ventricular cavity size and normal right ventricular systolic function.   - Repeat echo continuing to show EF 70%    #Afib w/RVR  - New onset Afib RVR (on tele, confirmed with EKG 2/19)   - Plan: Started metoprolol 5mg IVP q6 hours & Hep gtt -> metoprolol held iso hypotension  - If rate remains uncontrolled, can start Cardizem drip at 5mg/h  - c/w Heparin drip    #HTN:  - On Hydralazine 25mg TID, Amlodipine 5mg, held iso current hypotension    PULM:  #Vent   - Due to increased work of breathing, now intubated  - SBT with 12/10 today    #AHRF  - Patient admitted to the hospital for AHRF, found to be COVID (+)  - Likely 2/2 PNA, does not appear to CHF decompensation  - Diuretics currently being held  - Continue with airway clearance regimen - duoneb, hypertonic saline, chest PT  - CT chest with b/l GGOs, possible early COVID fibrosis, area of consolidation ?infectious vs. atelectasis    RENAL:  #SHAGUFTA:   - Hx of CKD stage 3, Cr 2.38 at baseline, seems to be plateauing at 4.24  - In setting of COVID (+)  - Pre-renal, FeUrea 29%  - Monitor Cr  - Avoid nephrotoxic meds  - Wakefield in place  - Bumex 4 gtt  - Monitor BMP q12      #Electrolyte abnormalities  Hypernatremia, resolved  - Continue to monitor  - Nephro recs appreciated    GI:  No acute issues     #Diet: Tube feeds via OG tube  #Bowel Regimen  - On Senna and Miralax    ENDO:  No acute issues  Thyroid nodule noted in prior notes    HEMATOLOGIC:  #Thrombocytopenic  - Unclear origin  - Concern for possible mets to bone marrow? vs due to infection  - Continue to monitor  - Transfuse to maintain Plt>10K unless actively bleeding then maintain >50K  - Heme-Onc recs appreciated    #DVT prophylaxis   - SCD    ID:  #COVID-19  - Concern for possible superimposed PNA   - S/p zosyn 2/16- 2/28  - S/p Remdesivir  - Blood cx from 2 days ago negative    SKIN:  #Lines:  2x PIV    Ethics:  - Full Code  - Contact: Sister Ester [phone]

## 2025-02-28 NOTE — PROGRESS NOTE ADULT - PROBLEM SELECTOR PLAN 1
-Recent admission for acute respiratory failure in the setting of grand mal seizures, influenza, PNA. S/p intubation in MICU, was extubated to AVAPS  -Now COVID +  -CXR with low lung volumes, mild congestion. Possible underlying infiltrate  -CT chest with b/l GGO, new since 1/30/25. Likely COVID PNA +/- superimposed bacterial PNA.   -Started on HFNC 2/18  -S/p multiple RRTs for hypoxia, increased WOB. Intubated s/p RRT on 2/23 & transferred to MICU  -Continue bronchodilators  -ABX per ID/MICU  -Pressure support trials as tolerated  -Keep sats >90%.  2/26: remains intubated:  on 40% fio2:  per MICU :  cont antibiotics:  cxr with Improvement in upper lobes  2/27: seems to be doing same:  remains intubated and sedated : on antibiotics and pressors:  cont current rx

## 2025-02-28 NOTE — PROGRESS NOTE ADULT - SUBJECTIVE AND OBJECTIVE BOX
Date of Service: 02-28-25 @ 15:34    Patient is a 69y old  Male who presents with a chief complaint of Acute on chronic hypoxemic respiratory failure (28 Feb 2025 08:21)      Any change in ROS: he is still intubated     MEDICATIONS  (STANDING):  albuterol/ipratropium for Nebulization 3 milliLiter(s) Nebulizer every 6 hours  atorvastatin 20 milliGRAM(s) Oral at bedtime  buMETAnide Infusion 1 mG/Hr (5 mL/Hr) IV Continuous <Continuous>  calcium acetate 667 milliGRAM(s) Oral every 8 hours  chlorhexidine 0.12% Liquid 15 milliLiter(s) Oral Mucosa every 12 hours  cholecalciferol 1000 Unit(s) Oral daily  escitalopram 15 milliGRAM(s) Oral with breakfast  gabapentin Solution 150 milliGRAM(s) Oral every 12 hours  heparin  Infusion.  Unit(s)/Hr (17 mL/Hr) IV Continuous <Continuous>  influenza  Vaccine (HIGH DOSE) 0.5 milliLiter(s) IntraMuscular once  lacosamide IVPB 200 milliGRAM(s) IV Intermittent every 12 hours  lamoTRIgine 25 milliGRAM(s) Oral two times a day  levETIRAcetam   Injectable 750 milliGRAM(s) IV Push every 12 hours  lurasidone 40 milliGRAM(s) Oral at bedtime  naloxegol 12.5 milliGRAM(s) Oral daily  pantoprazole   Suspension 40 milliGRAM(s) Oral daily  piperacillin/tazobactam IVPB.. 3.375 Gram(s) IV Intermittent every 12 hours  polyethylene glycol 3350 17 Gram(s) Oral daily  propofol Infusion 10 MICROgram(s)/kG/Min (5.62 mL/Hr) IV Continuous <Continuous>  senna 2 Tablet(s) Oral at bedtime  sodium chloride 3%  Inhalation 4 milliLiter(s) Inhalation every 6 hours    MEDICATIONS  (PRN):    Vital Signs Last 24 Hrs  T(C): 37.5 (28 Feb 2025 12:00), Max: 37.6 (28 Feb 2025 00:00)  T(F): 99.5 (28 Feb 2025 12:00), Max: 99.7 (28 Feb 2025 00:00)  HR: 96 (28 Feb 2025 15:07) (84 - 98)  BP: 114/61 (28 Feb 2025 13:00) (94/51 - 134/76)  BP(mean): 82 (28 Feb 2025 13:00) (70 - 99)  RR: 27 (28 Feb 2025 13:00) (25 - 29)  SpO2: 97% (28 Feb 2025 15:07) (91% - 98%)    Parameters below as of 28 Feb 2025 11:17  Patient On (Oxygen Delivery Method): ventilator      Mode: CPAP with PS  FiO2: 40  PEEP: 6  PS: 8  MAP: 10    I&O's Summary    27 Feb 2025 07:01  -  28 Feb 2025 07:00  --------------------------------------------------------  IN: 3763.2 mL / OUT: 3080 mL / NET: 683.2 mL    28 Feb 2025 07:01  -  28 Feb 2025 15:34  --------------------------------------------------------  IN: 503 mL / OUT: 650 mL / NET: -147 mL          Physical Exam:   GENERAL: NAD, well-groomed, well-developed  HEENT: BREE/   Atraumatic, Normocephalic  ENMT: No tonsillar erythema, exudates, or enlargement; Moist mucous membranes, Good dentition, No lesions  NECK: Supple, No JVD, Normal thyroid  CHEST/LUNG: Clear to auscultaion,  CVS: Regular rate and rhythm; No murmurs, rubs, or gallops  GI: : Soft, Nontender, Nondistended; Bowel sounds present  NERVOUS SYSTEM:  intubated and sedated   EXTREMITIES: - edema  LYMPH: No lymphadenopathy noted  SKIN: No rashes or lesions  ENDOCRINOLOGY: No Thyromegaly  PSYCH: intubated     Labs:  ABG - ( 28 Feb 2025 00:17 )  pH, Arterial: 7.34  pH, Blood: x     /  pCO2: 44    /  pO2: 100   / HCO3: 24    / Base Excess: -2.0  /  SaO2: 99.1            40, 100, 24, 24                            7.5    8.31  )-----------( 104      ( 28 Feb 2025 00:29 )             23.9                         7.5    6.50  )-----------( 104      ( 27 Feb 2025 00:24 )             23.4                         7.3    7.55  )-----------( 103      ( 26 Feb 2025 00:17 )             23.2                         7.4    9.17  )-----------( 98       ( 25 Feb 2025 00:59 )             24.0     02-28    137  |  92[L]  |  104[H]  ----------------------------<  176[H]  3.6   |  21[L]  |  3.89[H]  02-28    138  |  95[L]  |  98[H]  ----------------------------<  127[H]  3.5   |  21[L]  |  4.01[H]  02-27    140  |  95[L]  |  92[H]  ----------------------------<  179[H]  4.3   |  24  |  4.03[H]  02-27    142  |  98  |  87[H]  ----------------------------<  113[H]  3.9   |  22  |  4.08[H]  02-26    141  |  98  |  81[H]  ----------------------------<  187[H]  4.2   |  22  |  3.95[H]  02-26    142  |  99  |  77[H]  ----------------------------<  161[H]  3.8   |  25  |  4.03[H]  02-26    140  |  102  |  75[H]  ----------------------------<  142[H]  3.8   |  21[L]  |  4.01[H]  02-25    143  |  104  |  70[H]  ----------------------------<  177[H]  4.4   |  22  |  4.14[H]  02-25    146[H]  |  110[H]  |  67[H]  ----------------------------<  138[H]  3.5   |  19[L]  |  4.24[H]    Ca    9.0      28 Feb 2025 12:33  Ca    9.1      28 Feb 2025 00:29  Ca    9.2      27 Feb 2025 09:21  Ca    9.2      27 Feb 2025 00:24  Ca    9.6      26 Feb 2025 16:13  Phos  7.9     02-28  Phos  7.0     02-28  Phos  6.4     02-27  Phos  6.9     02-27  Phos  5.4     02-26  Mg     2.2     02-28  Mg     2.2     02-28  Mg     2.2     02-27  Mg     2.2     02-27  Mg     2.1     02-26    TPro  7.3  /  Alb  2.9[L]  /  TBili  0.3  /  DBili  x   /  AST  50[H]  /  ALT  54[H]  /  AlkPhos  166[H]  02-28  TPro  6.8  /  Alb  2.6[L]  /  TBili  0.3  /  DBili  x   /  AST  50[H]  /  ALT  46[H]  /  AlkPhos  153[H]  02-27  TPro  6.7  /  Alb  2.7[L]  /  TBili  0.4  /  DBili  x   /  AST  41[H]  /  ALT  39  /  AlkPhos  129[H]  02-25  TPro  6.0  /  Alb  2.4[L]  /  TBili  0.4  /  DBili  x   /  AST  40  /  ALT  30  /  AlkPhos  120  02-25    CAPILLARY BLOOD GLUCOSE      POCT Blood Glucose.: 185 mg/dL (28 Feb 2025 11:25)      LIVER FUNCTIONS - ( 28 Feb 2025 00:29 )  Alb: 2.9 g/dL / Pro: 7.3 g/dL / ALK PHOS: 166 U/L / ALT: 54 U/L / AST: 50 U/L / GGT: x           PT/INR - ( 28 Feb 2025 00:29 )   PT: 11.1 sec;   INR: 0.96 ratio         PTT - ( 28 Feb 2025 12:33 )  PTT:65.5 sec  Urinalysis Basic - ( 28 Feb 2025 12:33 )    Color: x / Appearance: x / SG: x / pH: x  Gluc: 176 mg/dL / Ketone: x  / Bili: x / Urobili: x   Blood: x / Protein: x / Nitrite: x   Leuk Esterase: x / RBC: x / WBC x   Sq Epi: x / Non Sq Epi: x / Bacteria: x      D-Dimer Assay, Quantitative: 2027 ng/mL DDU (02-23 @ 06:14)        RECENT CULTURES:  02-21 @ 19:36 .Blood Blood         rad< from: CT Chest No Cont (02.26.25 @ 22:32) >  BONES: Degenerative changes of the spine with partially visualized   thoracolumbar surgical hardware. Chronic healed bilateral rib and right   clavicular fractures.      IMPRESSION:  Since 2/17/2025, while the upper lobe groundglass opacities have   improved, there are new and worsening confluent areas of   consolidation/pneumonia in the mid to lower lungs.        --- End of Report ---          INESSA DAVID DO; Resident Radiologist  This document has been electronically signed.  SALONI CUELLAR M.D., Attending Radiologist  This document has been electronically signed. Feb 27 2025 12:10PM    < end of copied text >         No growth at 5 days    02-21 @ 19:35 .Blood Blood                No growth at 5 days          RESPIRATORY CULTURES:          Studies  Chest X-RAY  CT SCAN Chest   Venous Dopplers: LE:   CT Abdomen  Others

## 2025-02-28 NOTE — PROGRESS NOTE ADULT - SUBJECTIVE AND OBJECTIVE BOX
[facility]  JACINTO Horton Y. Patel, S. Shah, G. Casimir  [phone]  ([phone] - weekdays after 5pm and weekends)    Name: OSCAR MILAN  Age/Gender: 69y Male  MRN: [id number]    Interval History:  Patient seen and examined this morning.   Remains intubated, unable to obtain ROS.   Notes reviewed  No concerning overnight events  Afebrile   Allergies: seasonal allergies (Unknown)  penicillin (Hives)      Objective:  Vitals:   T(F): 97.7 (02-28-25 @ 04:00), Max: 99.7 (02-28-25 @ 00:00)  HR: 90 (02-28-25 @ 07:00) (87 - 97)  BP: 107/56 (02-28-25 @ 07:00) (95/53 - 134/76)  RR: 28 (02-28-25 @ 07:00) (23 - 29)  SpO2: 97% (02-28-25 @ 07:00) (91% - 98%)  Physical Examination:  General: no acute distress  HEENT: normocephalic, atraumatic, ETT  Respiratory: clear to auscultation anteriorly   Cardiovascular: S1S2 present, normal rate   Gastrointestinal: obese, soft, nondistended  Extremities: no LE edema, no cyanosis  Skin: no visible rash    Laboratory Studies:  CBC:                       7.5    8.31  )-----------( 104      ( 28 Feb 2025 00:29 )             23.9     WBC Trend:  8.31 02-28-25 @ 00:29  6.50 02-27-25 @ 00:24  7.55 02-26-25 @ 00:17  9.17 02-25-25 @ 00:59  8.76 02-23-25 @ 22:48  10.78 02-23-25 @ 16:19  9.74 02-23-25 @ 06:14  8.59 02-22-25 @ 06:08  7.85 02-21-25 @ 19:44    CMP: 02-28    138  |  95[L]  |  98[H]  ----------------------------<  127[H]  3.5   |  21[L]  |  4.01[H]    Ca    9.1      28 Feb 2025 00:29  Phos  7.0     02-28  Mg     2.2     02-28    TPro  7.3  /  Alb  2.9[L]  /  TBili  0.3  /  DBili  x   /  AST  50[H]  /  ALT  54[H]  /  AlkPhos  166[H]  02-28    Creatinine: 4.01 mg/dL (02-28-25 @ 00:29)  Creatinine: 4.03 mg/dL (02-27-25 @ 09:21)  Creatinine: 4.08 mg/dL (02-27-25 @ 00:24)  Creatinine: 3.95 mg/dL (02-26-25 @ 16:13)  Creatinine: 4.03 mg/dL (02-26-25 @ 07:53)  Creatinine: 4.01 mg/dL (02-26-25 @ 00:17)  Creatinine: 4.14 mg/dL (02-25-25 @ 15:50)  Creatinine: 4.24 mg/dL (02-25-25 @ 00:59)  Creatinine: 4.24 mg/dL (02-24-25 @ 15:24)  Creatinine: 4.13 mg/dL (02-24-25 @ 11:38)  Creatinine: 3.65 mg/dL (02-23-25 @ 22:48)  Creatinine: 2.64 mg/dL (02-23-25 @ 06:11)  Creatinine: 2.47 mg/dL (02-22-25 @ 06:08)  Creatinine: 2.54 mg/dL (02-21-25 @ 19:36)  Creatinine: 2.44 mg/dL (02-21-25 @ 14:13)    LIVER FUNCTIONS - ( 28 Feb 2025 00:29 )  Alb: 2.9 g/dL / Pro: 7.3 g/dL / ALK PHOS: 166 U/L / ALT: 54 U/L / AST: 50 U/L / GGT: x           Microbiology: reviewed   Culture - Blood (collected 02-21-25 @ 19:36)  Source: .Blood Blood  Final Report (02-27-25 @ 02:01):    No growth at 5 days    Culture - Blood (collected 02-21-25 @ 19:35)  Source: .Blood Blood  Final Report (02-27-25 @ 02:01):    No growth at 5 days    Culture - Blood (collected 02-19-25 @ 00:18)  Source: .Blood Blood-Peripheral  Final Report (02-24-25 @ 03:01):    No growth at 5 days    Culture - Blood (collected 02-19-25 @ 00:18)  Source: .Blood Blood-Peripheral  Final Report (02-24-25 @ 03:01):    No growth at 5 days    Culture - Sputum (collected 02-17-25 @ 22:23)  Source: .Sputum Sputum  Gram Stain (02-18-25 @ 06:21):    Few polymorphonuclear leukocytes per low power field    No Squamous epithelial cells per low power field    Few Gram Variable Rods seen per oil power field    Rare Gram positive cocci in pairs seen per oil power field  Final Report (02-19-25 @ 16:20):    Commensal lucia consistent with body site    Culture - Urine (collected 02-16-25 @ 11:47)  Source: Clean Catch Clean Catch (Midstream)  Final Report (02-18-25 @ 11:44):    10,000 - 49,000 CFU/mL Klebsiella pneumoniae  Organism: Klebsiella pneumoniae (02-18-25 @ 11:44)  Organism: Klebsiella pneumoniae (02-18-25 @ 11:44)      Method Type: MARIELENA      -  Amoxicillin/Clavulanic Acid: S <=8/4      -  Ampicillin: R >16 These ampicillin results predict results for amoxicillin      -  Ampicillin/Sulbactam: S <=4/2      -  Aztreonam: S <=4      -  Cefazolin: S <=2 For uncomplicated UTI with K. pneumoniae, E. coli, or P. mirablis: MARIELENA <=16 is sensitive and MARIELENA >=32 is resistant. This also predicts results for oral agents cefaclor, cefdinir, cefpodoxime, cefprozil, cefuroxime axetil, cephalexin and locarbef for uncomplicated UTI. Note that some isolates may be susceptible to these agents while testing resistant to cefazolin.      -  Cefepime: S <=2      -  Cefoxitin: S <=8      -  Ceftriaxone: S <=1      -  Cefuroxime: S <=4      -  Ciprofloxacin: S <=0.25      -  Ertapenem: S <=0.5      -  Gentamicin: S <=2      -  Imipenem: S <=1      -  Levofloxacin: S <=0.5      -  Meropenem: S <=1      -  Nitrofurantoin: I 64 Should not be used to treat pyelonephritis      -  Piperacillin/Tazobactam: S <=8      -  Tobramycin: S <=2      -  Trimethoprim/Sulfamethoxazole: S <=0.5/9.5    Culture - Blood (collected 02-16-25 @ 09:35)  Source: .Blood BLOOD  Final Report (02-21-25 @ 15:01):    No growth at 5 days    Culture - Blood (collected 02-16-25 @ 09:25)  Source: .Blood BLOOD  Final Report (02-21-25 @ 15:01):    No growth at 5 days    Radiology: reviewed     Medications:  albuterol/ipratropium for Nebulization 3 milliLiter(s) Nebulizer every 6 hours  atorvastatin 20 milliGRAM(s) Oral at bedtime  buMETAnide Infusion 4 mG/Hr IV Continuous <Continuous>  chlorhexidine 0.12% Liquid 15 milliLiter(s) Oral Mucosa every 12 hours  cholecalciferol 1000 Unit(s) Oral daily  escitalopram 15 milliGRAM(s) Oral with breakfast  gabapentin Solution 150 milliGRAM(s) Oral every 12 hours  heparin  Infusion.  Unit(s)/Hr IV Continuous <Continuous>  influenza  Vaccine (HIGH DOSE) 0.5 milliLiter(s) IntraMuscular once  lacosamide IVPB 200 milliGRAM(s) IV Intermittent every 12 hours  lamoTRIgine 25 milliGRAM(s) Oral two times a day  levETIRAcetam  IVPB 1000 milliGRAM(s) IV Intermittent <User Schedule>  lurasidone 40 milliGRAM(s) Oral at bedtime  naloxegol 12.5 milliGRAM(s) Oral daily  pantoprazole   Suspension 40 milliGRAM(s) Oral daily  piperacillin/tazobactam IVPB.. 3.375 Gram(s) IV Intermittent every 8 hours  polyethylene glycol 3350 17 Gram(s) Oral daily  propofol Infusion 10 MICROgram(s)/kG/Min IV Continuous <Continuous>  senna 2 Tablet(s) Oral at bedtime  sodium chloride 3%  Inhalation 4 milliLiter(s) Inhalation every 6 hours    Current Antimicrobials:  piperacillin/tazobactam IVPB.. 3.375 Gram(s) IV Intermittent every 8 hours    Prior/Completed Antimicrobials:  piperacillin/tazobactam IVPB..  piperacillin/tazobactam IVPB...  remdesivir  IVPB  remdesivir  IVPB  remdesivir  IVPB  vancomycin  IVPB.

## 2025-02-28 NOTE — PROGRESS NOTE ADULT - PROBLEM SELECTOR PLAN 4
TTE done here 1/17/25 technically difficult, but LV systolic fxn appears nl without any regional wall motion abnormalities  - BNP worsened this admission, but his symptoms are primarily due to COVID-19/bacterial PNA  - Strict I/Os  - Daily weights  - Repeat TTE reviewed: grossly normal LV  - Will hold off diuretics for now due to ongoing infection and SAHGUFTA  - Appreciate cardiology

## 2025-02-28 NOTE — PROGRESS NOTE ADULT - ASSESSMENT
69 year old male with a past medical history of hypertension, hyperlipemia VAZQUEZ (on CPAP at bedtime, NC 2 liters during the day), CKD stage 3, epilepsy, schizophrenia, developmental delay, metastatic testicular cancer (s/p orchiectomy, actively on chemotherapy, last dose of etoposide/cisplatin on 6/2024) presenting with worsening shortness of breath. Patient was recently hospitalized at [facility] from January 27th 2025 to February 6th 2025 for seizure and influenza virus infection, which required intubation. He was sent to rehab (originally from home) from hospital. He returns due to sudden onset shortness of breath and worsening oxygenation. Of note, he tested positive for COVID-19       1- SHAGUFTA on CKDIII  2- covid-19  3- anemia  4- hypotension  bp is better   5- respiratory failure   6- chf       SHAGUFTA suspected in setting of new infection. covid 19   cr is  steady bun is higher   decrease bumex to 1mg/hr and lower   hyperphosphatemia add phoslo one tid   decadron 6 mg iv daily   suspect superimposed pna tx with zosyn 3.375 g iv tid   anemia, trend hgb  d.w icu team when seen earlier

## 2025-02-28 NOTE — PROGRESS NOTE ADULT - ASSESSMENT
Patient is a 69 year old male with PMH of HTN, HLD, VAZQUEZ (on CPAP at bedtime, NC 2 liters during the day), CKD3, epilepsy, schizophrenia, developmental delay, metastatic testicular cancer (s/p orchiectomy, actively on chemotherapy, last dose of etoposide/cisplatin on 6/2024) presenting with worsening shortness of breath. Patient was recently hospitalized at [facility] from January 27th 2025 to February 6th 2025 for seizure and influenza virus infection, which required intubation. He was sent to rehab (originally from home) from hospital and now returns due to sudden onset shortness of breath and worsening oxygenation. Of note, he tested positive for COVID-19 at facility on 2/13/25 and was not put on any COVID-19 treatment at the time, only guaifenesin and scopolamine patch. Patient was placed on non-rebreather and sent to the hospital on 2/16/25.     Acute on chronic hypoxic respiratory failure  COVID-19 pneumonia, superimposed bacterial pneumonia  Acute on chronic HFpEF  Klebsiella UTI, treated   SHAGUFTA on CKD  Course c/b severe sepsis, hypotension, SHAGUFTA, likely due to aspiration pneumonia   - noted initial discussion with patient/family and decided to hold off on remdesivir given LFTs and SHAGUFTA  - CT chest with extensive b/l ggo c/w COVID pneumonia; tracheomalacia   - MRSA PCR screen negative, s/p vancomycin   - 2/18 worsening resp status on NC requiring AVAPS, started on remdesivir   - 2/22 completed remdesivir x5d course   - 2/23 s/p RRT for inc WOB, s/p intubation, suspected aspiration   - 2/24 intubated, on sedation, pressor support, SHAGUFTA, WBC wnl   - 2/25 afebrile, off pressor, WBC wnl, Cr stable  - 2/26 CT chest with improved upper lobe ggo, new/worsening confluent areas of consolidation in mid-lower lungs   - 2/26 completed dexamethasone x10d  - 2/28 remains afebrile, on FiO2 40%, no leukocytosis    Recommendations:  Continue zosyn 3.375g IV Q8h (renally dosed) to complete course today 2/28  Nephrology following, monitor Cr   Diuresis as tolerated   Monitor temps/WBC  Supportive care  Aspiration precautions  Continue rest of care per primary team     Over the weekend Dr. Micaela Jurado will be covering for our group. If you have any questions, concerns or new micro data please reach out to them using TEAMS *PREFERRED* or by calling our service at [phone].     Greyson Mart M.D.  [facility]  Available on [employer] TEAMS - *PREFERRED*  [phone]  After 5pm on weekdays and all day on weekends - please call [phone]     Thank you for consulting us and involving us in the management of this patients case. In addition to reviewing history, imaging, documents, labs, microbiology, took into account antibiotic stewardship, local antibiogram and infection control strategies and potential transmission issues.

## 2025-02-28 NOTE — PROGRESS NOTE ADULT - NUTRITIONAL ASSESSMENT
This patient has been assessed with a concern for Malnutrition and has been determined to have a diagnosis/diagnoses of Severe protein-calorie malnutrition.    This patient is being managed with:   Diet NPO with Tube Feed-  Tube Feeding Modality: Orogastric  Vital AF (VITALAFRTH)  Total Volume for 24 Hours (mL): 1170  Continuous  Starting Tube Feed Rate {mL per Hour}: 10  Increase Tube Feed Rate by (mL): 10     Every 4 hours  Until Goal Tube Feed Rate (mL per Hour): 65  Tube Feed Duration (in Hours): 18  Tube Feed Start Time: 11:00  Entered: Feb 27 2025  8:01PM

## 2025-02-28 NOTE — PROGRESS NOTE ADULT - ASSESSMENT
Mr. Gr is a 69 year old male with PMHx of seizure disorder, sleep apnea, HTN, chronic idiopathic constipation, stage III CKD, severe obesity, secondary hyperparathyroidism, anemia, thrombocytopenia, hyperlipidemia, testicular cancer under treatment with Dr. Ovalles who is admitted for acute hypoxic respiratory failure secondary to COVID vs superimposed pneumonia with new onset thrombocytopenia. He was recently discharged 02/06/2025 after a tenous stay including intubation in the ICU for respiratory failure in setting of seizure. He had recovered and was discharged to rehab.      Former smoker, quit 14y prior, denied ETOH, drug use. Lives at home. Does not have children. Denies family history of blood disorders or malignancy.  Denies previous workplace related exposures.  Denies previous history of blood transfusions.  Denied history of thromboses.  Denied history of  requiring anticoagulation.     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  Margins were negative.  pT3Nx. CT C/A/P with few left para-aortic and left iliac chain lymph nodes largest measuring 8.1 cm left para-aortic node, bilateral subcentimeter noncalcified pulmonary nodules measuring up to 7 mm. AFP, LDH, hCG were all elevated. 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 Started on adjuvant therapy with Cisplatin+Etoposide, so far completed 4 cycles of treatment with cisplatin dose reduced 50% and Etoposide 50% due to thrombocytopenia.      02/19/2025: on HFNC, noted tachycardia and fever, Klebsiella in urine as well, thrombocytopenia stable/improving, no signs of active bleeding     02/20/2025: developed A.fib with RVR and was placed on heparin drip and pending cardiology eval    02/21/2025: awake, on AVAPS overnight, noted RRT for hypoxia as pt removed HFNC at some point in time, good response thereafter     02/22/2025:  continues on AVAPS this morning, noted RRT overnight for hypoxia, hypercapnia, ICU following, US LE negative for DVT, counts overall stable/improved     02/23/2025: progressive respiratory failure, noted ongoing RTT this morning with pending intubation and transfer to MICU     02/24/2025: intubated 02/23/2025, sedated, on pressor support, no signs of bleeding, counts noted, no signs of bleeding     02/25/2025: continues in MICU, intubated and sedated, no signs of bleeding    02/26/2025: continues in MICU, intubated, without signs of bleeding, continues on heparin drip     02/27/2025:  remains under the care of the ICU, intubated, no signs of bleeding and continues on heparin drip, counts stable, has not required PRBC support this admission      #Acute hypoxic respiratory failure  # COVID  - CT noted with bilateral GGO  - ID following  - Pulmonary following  - Antibiotics per primary team/MICU and ID   - Intubated 02/23/2025 AM, MICU     # Thrombocytopenia   - Did occur during most recent admission and improved to normal prior to discharge   - Without signs of bleeding  - Could be multifactorial, possibly due to infection   - Continue to trend  - Transfuse to maintain Plt > 10k unless actively bleeding then maintain > 50k     # Brain lesion  - pt with hx of seizures  - MRI brain now with 5.4 mm enhancing lesion in the LEFT middle cerebellar peduncle with associated edema suspicious for metastases  - MRI Lumbar negative for acute disease  - Small lesion without mass effect or edema, recommended to correlate per neurology if foci and locus are concordant with seizure activity  - Neurology recs noted, new lesion not likely to cause seizure  - It is rare, but nonetheless not impossible, for testicular cancer to be metastatic to the brain  - He will need another PET-CT, can be completed outpatient once stable if concern can proceed with biopsy at that time   - Previous endocrinology eval for thyroid nodule noted  - AFP/[facility] normal,  previously      # 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 Ovalles, agree with current plan  - Follow up as outpatient with Franki Ovalles MD     # Acute kidney injury on CKD Stage IIIA  - Likely multifactorial  - Nephrology consult and recs noted  - Continue to trend     # Normocytic anemia  - Chronic, has hx of PRBC during chemo 07/2024  - Noted Anti E, Anti-K Anti-C antibodies previously  - Monitor for bleeding  - Recommend to transfuse to maintain Hb > 7    # Klebsiella UTI  -Antibiotics per ID and primary team       Recommendations  - Trend with CBC daily   - Transfuse to maintain Hb > 7   - Transfuse to maintain Plt >10k unless active bleeding then >50k   - Monitor renal function  - Antibiotics per primary team/MICU and ID   - Cardiology follow up to address anticoagulation, currently on heparin drip   - f/u ongoing ICU recs         Thank you for allowing me to participate in the care of Mr. Gr please do not hesitate to call or text me if you have further questions or concerns.     Brayan Mart MD  [facility] [place]   Division of Hematology/Oncology  Formerly named [facility], [address]  [place] [address]  P: [phone]  F: [phone]    Attestation:    ----Pt evaluated including face-to-face interaction in addition to chart review, reviewing treatment plan, and managing the patient’s chronic diagnoses as listed in the assessment----        Mr. Gr is a 69 year old male with PMHx of seizure disorder, sleep apnea, HTN, chronic idiopathic constipation, stage III CKD, severe obesity, secondary hyperparathyroidism, anemia, thrombocytopenia, hyperlipidemia, testicular cancer under treatment with Dr. Ovalles who is admitted for acute hypoxic respiratory failure secondary to COVID vs superimposed pneumonia with new onset thrombocytopenia. He was recently discharged 02/06/2025 after a tenous stay including intubation in the ICU for respiratory failure in setting of seizure. He had recovered and was discharged to rehab.      Former smoker, quit 14y prior, denied ETOH, drug use. Lives at home. Does not have children. Denies family history of blood disorders or malignancy.  Denies previous workplace related exposures.  Denies previous history of blood transfusions.  Denied history of thromboses.  Denied history of  requiring anticoagulation.     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  Margins were negative.  pT3Nx. CT C/A/P with few left para-aortic and left iliac chain lymph nodes largest measuring 8.1 cm left para-aortic node, bilateral subcentimeter noncalcified pulmonary nodules measuring up to 7 mm. AFP, LDH, hCG were all elevated. 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 Started on adjuvant therapy with Cisplatin+Etoposide, so far completed 4 cycles of treatment with cisplatin dose reduced 50% and Etoposide 50% due to thrombocytopenia.      02/19/2025: on HFNC, noted tachycardia and fever, Klebsiella in urine as well, thrombocytopenia stable/improving, no signs of active bleeding     02/20/2025: developed A.fib with RVR and was placed on heparin drip and pending cardiology eval    02/21/2025: awake, on AVAPS overnight, noted RRT for hypoxia as pt removed HFNC at some point in time, good response thereafter     02/22/2025:  continues on AVAPS this morning, noted RRT overnight for hypoxia, hypercapnia, ICU following, US LE negative for DVT, counts overall stable/improved     02/23/2025: progressive respiratory failure, noted ongoing RTT this morning with pending intubation and transfer to MICU     02/24/2025: intubated 02/23/2025, sedated, on pressor support, no signs of bleeding, counts noted, no signs of bleeding     02/25/2025: continues in MICU, intubated and sedated, no signs of bleeding    02/26/2025: continues in MICU, intubated, without signs of bleeding, continues on heparin drip     02/27/2025:  remains under the care of the ICU, intubated, no signs of bleeding and continues on heparin drip, counts stable, has not required PRBC support this admission    02/28/2025: continues under the care of MICU, continues intubated, no signs of bleeding, on heparin drip        #Acute hypoxic respiratory failure  # COVID  - CT noted with bilateral GGO  - ID following  - Pulmonary following  - Antibiotics per primary team/MICU and ID   - Intubated 02/23/2025 AM, MICU     # Thrombocytopenia   - Did occur during most recent admission and improved to normal prior to discharge   - Without signs of bleeding  - Could be multifactorial, possibly due to infection   - Continue to trend  - Transfuse to maintain Plt > 10k unless actively bleeding then maintain > 50k     # Brain lesion  - pt with hx of seizures  - MRI brain now with 5.4 mm enhancing lesion in the LEFT middle cerebellar peduncle with associated edema suspicious for metastases  - MRI Lumbar negative for acute disease  - Small lesion without mass effect or edema, recommended to correlate per neurology if foci and locus are concordant with seizure activity  - Neurology recs noted, new lesion not likely to cause seizure  - It is rare, but nonetheless not impossible, for testicular cancer to be metastatic to the brain  - He will need another PET-CT, can be completed outpatient once stable if concern can proceed with biopsy at that time   - Previous endocrinology eval for thyroid nodule noted  - AFP/[facility] normal,  previously      # 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 Ovalles, agree with current plan  - Follow up as outpatient with Franki Ovalles MD     # Acute kidney injury on CKD Stage IIIA  - Likely multifactorial  - Nephrology consult and recs noted  - Continue to trend     # Normocytic anemia  - Chronic, has hx of PRBC during chemo 07/2024  - Noted Anti E, Anti-K Anti-C antibodies previously  - Monitor for bleeding  - Recommend to transfuse to maintain Hb > 7    # Klebsiella UTI  -Antibiotics per ID and primary team       Recommendations  - Trend with CBC daily   - Transfuse to maintain Hb > 7   - Transfuse to maintain Plt >10k unless active bleeding then >50k   - Monitor renal function  - Antibiotics per primary team/MICU and ID   - Cardiology follow up to address anticoagulation, currently on heparin drip   - f/u ongoing ICU recs         Thank you for allowing me to participate in the care of Mr. Gr please do not hesitate to call or text me if you have further questions or concerns.     Brayan Mart MD  [facility] [place]   Division of Hematology/Oncology  [facility], [address]  [place] [address]  P: [phone]  F: [phone]    Attestation:    ----Pt evaluated including face-to-face interaction in addition to chart review, reviewing treatment plan, and managing the patient’s chronic diagnoses as listed in the assessment----

## 2025-02-28 NOTE — PROGRESS NOTE ADULT - NUTRITIONAL ASSESSMENT
This patient has been assessed with a concern for Malnutrition and has been determined to have a diagnosis/diagnoses of Severe protein-calorie malnutrition.    This patient is being managed with:   Diet NPO with Tube Feed-  Tube Feeding Modality: Orogastric  Vital AF (VITALAFRTH)  Total Volume for 24 Hours (mL): 1080  Intermittent  Starting Tube Feed Rate {mL per Hour}: 10  Increase Tube Feed Rate by (mL): 10    Every 4 hours  Until Goal Tube Feed Rate (mL per Hour): 60  Tube Feeding Hours ON: 18  Tube Feeding OFF (Hours): 6  Tube Feed Start Time: 11:00  Free Water Flush  Bolus   Total Volume per Flush (mL): 350   Frequency: Every 6 Hours  Entered: Feb 24 2025  2:54PM   Heavy tobacco smoker

## 2025-02-28 NOTE — PROGRESS NOTE ADULT - SUBJECTIVE AND OBJECTIVE BOX
SUBJECTIVE/ OVERNIGHT EVENTS:  remain Intubated sedated.  on Bumex gtt  repeat CT chest reviewed.   completed abx.    --------------------------------------------------------------------------------------------  LABS:                        7.5    8.31  )-----------( 104      ( 28 Feb 2025 00:29 )             23.9     02-28    137  |  92[L]  |  104[H]  ----------------------------<  176[H]  3.6   |  21[L]  |  3.89[H]    Ca    9.0      28 Feb 2025 12:33  Phos  7.9     02-28  Mg     2.2     02-28    TPro  7.3  /  Alb  2.9[L]  /  TBili  0.3  /  DBili  x   /  AST  50[H]  /  ALT  54[H]  /  AlkPhos  166[H]  02-28    PT/INR - ( 28 Feb 2025 00:29 )   PT: 11.1 sec;   INR: 0.96 ratio         PTT - ( 28 Feb 2025 12:33 )  PTT:65.5 sec  CAPILLARY BLOOD GLUCOSE      POCT Blood Glucose.: 185 mg/dL (28 Feb 2025 11:25)        Urinalysis Basic - ( 28 Feb 2025 12:33 )    Color: x / Appearance: x / SG: x / pH: x  Gluc: 176 mg/dL / Ketone: x  / Bili: x / Urobili: x   Blood: x / Protein: x / Nitrite: x   Leuk Esterase: x / RBC: x / WBC x   Sq Epi: x / Non Sq Epi: x / Bacteria: x        RADIOLOGY & ADDITIONAL TESTS:    Imaging Personally Reviewed:  [x] YES  [ ] NO    Consultant(s) Notes Reviewed:  [x] YES  [ ] NO    MEDICATIONS  (STANDING):  albuterol/ipratropium for Nebulization 3 milliLiter(s) Nebulizer every 6 hours  atorvastatin 20 milliGRAM(s) Oral at bedtime  buMETAnide Infusion 1 mG/Hr (5 mL/Hr) IV Continuous <Continuous>  calcium acetate 667 milliGRAM(s) Oral every 8 hours  chlorhexidine 0.12% Liquid 15 milliLiter(s) Oral Mucosa every 12 hours  cholecalciferol 1000 Unit(s) Oral daily  dexMEDEtomidine Infusion 0.2 MICROgram(s)/kG/Hr (4.68 mL/Hr) IV Continuous <Continuous>  escitalopram 15 milliGRAM(s) Oral with breakfast  gabapentin Solution 150 milliGRAM(s) Oral every 12 hours  heparin  Infusion.  Unit(s)/Hr (17 mL/Hr) IV Continuous <Continuous>  influenza  Vaccine (HIGH DOSE) 0.5 milliLiter(s) IntraMuscular once  lacosamide IVPB 200 milliGRAM(s) IV Intermittent every 12 hours  lamoTRIgine 25 milliGRAM(s) Oral two times a day  levETIRAcetam   Injectable 750 milliGRAM(s) IV Push every 12 hours  lurasidone 40 milliGRAM(s) Oral at bedtime  naloxegol 12.5 milliGRAM(s) Oral daily  pantoprazole   Suspension 40 milliGRAM(s) Oral daily  polyethylene glycol 3350 17 Gram(s) Oral daily  senna 2 Tablet(s) Oral at bedtime  sodium chloride 3%  Inhalation 4 milliLiter(s) Inhalation every 6 hours    MEDICATIONS  (PRN):      Care Discussed with Consultants/Other Providers [x] YES  [ ] NO    Vital Signs Last 24 Hrs  T(C): 37.5 (28 Feb 2025 12:00), Max: 37.6 (28 Feb 2025 00:00)  T(F): 99.5 (28 Feb 2025 12:00), Max: 99.7 (28 Feb 2025 00:00)  HR: 87 (28 Feb 2025 18:00) (84 - 100)  BP: 103/57 (28 Feb 2025 18:00) (94/51 - 129/66)  BP(mean): 75 (28 Feb 2025 18:00) (70 - 91)  RR: 27 (28 Feb 2025 18:00) (25 - 31)  SpO2: 99% (28 Feb 2025 18:00) (91% - 100%)    Parameters below as of 28 Feb 2025 17:28  Patient On (Oxygen Delivery Method): ventilator      I&O's Summary    27 Feb 2025 07:01  -  28 Feb 2025 07:00  --------------------------------------------------------  IN: 3763.2 mL / OUT: 3080 mL / NET: 683.2 mL    28 Feb 2025 07:01  -  28 Feb 2025 18:15  --------------------------------------------------------  IN: 1209 mL / OUT: 1250 mL / NET: -41 mL        PHYSICAL EXAM:  GENERAL: intubated, sedated  HEAD:  Atraumatic, Normocephalic  EYES: EOMI, PERRLA, conjunctiva and sclera clear  NECK: Supple, No JVD  CHEST/LUNG: mild decrease breath sounds bilaterally; No wheeze   HEART: Regular rate and rhythm; No murmurs, rubs, or gallops  ABDOMEN: Soft, Nontender, Nondistended; Bowel sounds present  Neuro:  intubated, sedated  EXTREMITIES:  2+ Peripheral Pulses, No clubbing, cyanosis, or edema  SKIN: No rashes or lesions

## 2025-02-28 NOTE — PROGRESS NOTE ADULT - SUBJECTIVE AND OBJECTIVE BOX
*******************************  Navya Michelle PGY-3  *******************************    INTERVAL HPI/OVERNIGHT EVENTS:    SUBJECTIVE: Patient seen and examined at bedside.     OBJECTIVE:    VITAL SIGNS:  ICU Vital Signs Last 24 Hrs  T(C): 36.5 (28 Feb 2025 04:00), Max: 37.6 (27 Feb 2025 08:00)  T(F): 97.7 (28 Feb 2025 04:00), Max: 99.7 (27 Feb 2025 08:00)  HR: 90 (28 Feb 2025 07:00) (87 - 97)  BP: 107/56 (28 Feb 2025 07:00) (95/53 - 134/76)  BP(mean): 76 (28 Feb 2025 07:00) (71 - 99)  ABP: --  ABP(mean): --  RR: 28 (28 Feb 2025 07:00) (23 - 29)  SpO2: 97% (28 Feb 2025 07:00) (91% - 98%)    O2 Parameters below as of 28 Feb 2025 05:33  Patient On (Oxygen Delivery Method): ventilator          Mode: AC/ CMV (Assist Control/ Continuous Mandatory Ventilation), RR (machine): 16, TV (machine): 450, FiO2: 40, PEEP: 8, ITime: 1, MAP: 12, PIP: 18    02-27 @ 07:01  -  02-28 @ 07:00  --------------------------------------------------------  IN: 3763.2 mL / OUT: 3080 mL / NET: 683.2 mL      CAPILLARY BLOOD GLUCOSE            PHYSICAL EXAM:  GENERAL: NAD, well-developed  HEAD:  Atraumatic, Normocephalic  EYES: EOMI, PERRLA, conjunctiva and sclera clear  NECK: Supple, No JVD  CHEST/LUNG: Clear to auscultation bilaterally; No wheeze  HEART: Regular rate and rhythm; No murmurs, rubs, or gallops  ABDOMEN: Soft, Nontender, Nondistended; Bowel sounds present  EXTREMITIES:  2+ Peripheral Pulses, No clubbing, cyanosis, or edema  PSYCH: AAOx3  NEUROLOGY: non-focal  SKIN: No rashes or lesions  Lines:      MEDICATIONS:  MEDICATIONS  (STANDING):  albuterol/ipratropium for Nebulization 3 milliLiter(s) Nebulizer every 6 hours  atorvastatin 20 milliGRAM(s) Oral at bedtime  buMETAnide Infusion 4 mG/Hr (20 mL/Hr) IV Continuous <Continuous>  chlorhexidine 0.12% Liquid 15 milliLiter(s) Oral Mucosa every 12 hours  cholecalciferol 1000 Unit(s) Oral daily  escitalopram 15 milliGRAM(s) Oral with breakfast  gabapentin Solution 150 milliGRAM(s) Oral every 12 hours  heparin  Infusion.  Unit(s)/Hr (17 mL/Hr) IV Continuous <Continuous>  influenza  Vaccine (HIGH DOSE) 0.5 milliLiter(s) IntraMuscular once  lacosamide IVPB 200 milliGRAM(s) IV Intermittent every 12 hours  lamoTRIgine 25 milliGRAM(s) Oral two times a day  levETIRAcetam  IVPB 1000 milliGRAM(s) IV Intermittent <User Schedule>  lurasidone 40 milliGRAM(s) Oral at bedtime  naloxegol 12.5 milliGRAM(s) Oral daily  pantoprazole   Suspension 40 milliGRAM(s) Oral daily  piperacillin/tazobactam IVPB.. 3.375 Gram(s) IV Intermittent every 8 hours  polyethylene glycol 3350 17 Gram(s) Oral daily  propofol Infusion 10 MICROgram(s)/kG/Min (5.62 mL/Hr) IV Continuous <Continuous>  senna 2 Tablet(s) Oral at bedtime  sodium chloride 3%  Inhalation 4 milliLiter(s) Inhalation every 6 hours    MEDICATIONS  (PRN):      ALLERGIES:  Allergies    seasonal allergies (Unknown)  penicillin (Hives)    Intolerances    latex (Rash)      LABS:                        7.5    8.31  )-----------( 104      ( 28 Feb 2025 00:29 )             23.9     02-28    138  |  95[L]  |  98[H]  ----------------------------<  127[H]  3.5   |  21[L]  |  4.01[H]    Ca    9.1      28 Feb 2025 00:29  Phos  7.0     02-28  Mg     2.2     02-28    TPro  7.3  /  Alb  2.9[L]  /  TBili  0.3  /  DBili  x   /  AST  50[H]  /  ALT  54[H]  /  AlkPhos  166[H]  02-28    PT/INR - ( 28 Feb 2025 00:29 )   PT: 11.1 sec;   INR: 0.96 ratio         PTT - ( 28 Feb 2025 00:29 )  PTT:57.9 sec  Urinalysis Basic - ( 28 Feb 2025 00:29 )    Color: x / Appearance: x / SG: x / pH: x  Gluc: 127 mg/dL / Ketone: x  / Bili: x / Urobili: x   Blood: x / Protein: x / Nitrite: x   Leuk Esterase: x / RBC: x / WBC x   Sq Epi: x / Non Sq Epi: x / Bacteria: x        RADIOLOGY & ADDITIONAL TESTS: Reviewed.     *******************************  Navya Michelle PGY-3  *******************************    INTERVAL HPI/OVERNIGHT EVENTS: Noted with possible increase in secretions, duonebs/hypertonic saline started. Free water discontinued.     SUBJECTIVE: Patient seen and examined at bedside, not interacting     OBJECTIVE:    VITAL SIGNS:  ICU Vital Signs Last 24 Hrs  T(C): 36.5 (28 Feb 2025 04:00), Max: 37.6 (27 Feb 2025 08:00)  T(F): 97.7 (28 Feb 2025 04:00), Max: 99.7 (27 Feb 2025 08:00)  HR: 90 (28 Feb 2025 07:00) (87 - 97)  BP: 107/56 (28 Feb 2025 07:00) (95/53 - 134/76)  BP(mean): 76 (28 Feb 2025 07:00) (71 - 99)  ABP: --  ABP(mean): --  RR: 28 (28 Feb 2025 07:00) (23 - 29)  SpO2: 97% (28 Feb 2025 07:00) (91% - 98%)    O2 Parameters below as of 28 Feb 2025 05:33  Patient On (Oxygen Delivery Method): ventilator          Mode: AC/ CMV (Assist Control/ Continuous Mandatory Ventilation), RR (machine): 16, TV (machine): 450, FiO2: 40, PEEP: 8, ITime: 1, MAP: 12, PIP: 18    02-27 @ 07:01  -  02-28 @ 07:00  --------------------------------------------------------  IN: 3763.2 mL / OUT: 3080 mL / NET: 683.2 mL      CAPILLARY BLOOD GLUCOSE            PHYSICAL EXAM:  GENERAL: Intubated sedated  HEENT: NCAT  NECK: supple without JVD  CHEST/LUNG: breath sounds bilaterally  CARDIOVASCULAR: regular rate and rhythm, normal S1 and S2, no murmur/rub/gallop  ABDOMEN: positive bowel sounds, non-distended, no organomegaly   EXTREMITIES: no lower extremity edema, b/l upper extremities edematous, though improved in appearance  NEUROLOGY: sedated    MEDICATIONS:  MEDICATIONS  (STANDING):  albuterol/ipratropium for Nebulization 3 milliLiter(s) Nebulizer every 6 hours  atorvastatin 20 milliGRAM(s) Oral at bedtime  buMETAnide Infusion 4 mG/Hr (20 mL/Hr) IV Continuous <Continuous>  chlorhexidine 0.12% Liquid 15 milliLiter(s) Oral Mucosa every 12 hours  cholecalciferol 1000 Unit(s) Oral daily  escitalopram 15 milliGRAM(s) Oral with breakfast  gabapentin Solution 150 milliGRAM(s) Oral every 12 hours  heparin  Infusion.  Unit(s)/Hr (17 mL/Hr) IV Continuous <Continuous>  influenza  Vaccine (HIGH DOSE) 0.5 milliLiter(s) IntraMuscular once  lacosamide IVPB 200 milliGRAM(s) IV Intermittent every 12 hours  lamoTRIgine 25 milliGRAM(s) Oral two times a day  levETIRAcetam  IVPB 1000 milliGRAM(s) IV Intermittent <User Schedule>  lurasidone 40 milliGRAM(s) Oral at bedtime  naloxegol 12.5 milliGRAM(s) Oral daily  pantoprazole   Suspension 40 milliGRAM(s) Oral daily  piperacillin/tazobactam IVPB.. 3.375 Gram(s) IV Intermittent every 8 hours  polyethylene glycol 3350 17 Gram(s) Oral daily  propofol Infusion 10 MICROgram(s)/kG/Min (5.62 mL/Hr) IV Continuous <Continuous>  senna 2 Tablet(s) Oral at bedtime  sodium chloride 3%  Inhalation 4 milliLiter(s) Inhalation every 6 hours    MEDICATIONS  (PRN):      ALLERGIES:  Allergies    seasonal allergies (Unknown)  penicillin (Hives)    Intolerances    latex (Rash)      LABS:                        7.5    8.31  )-----------( 104      ( 28 Feb 2025 00:29 )             23.9     02-28    138  |  95[L]  |  98[H]  ----------------------------<  127[H]  3.5   |  21[L]  |  4.01[H]    Ca    9.1      28 Feb 2025 00:29  Phos  7.0     02-28  Mg     2.2     02-28    TPro  7.3  /  Alb  2.9[L]  /  TBili  0.3  /  DBili  x   /  AST  50[H]  /  ALT  54[H]  /  AlkPhos  166[H]  02-28    PT/INR - ( 28 Feb 2025 00:29 )   PT: 11.1 sec;   INR: 0.96 ratio         PTT - ( 28 Feb 2025 00:29 )  PTT:57.9 sec  Urinalysis Basic - ( 28 Feb 2025 00:29 )    Color: x / Appearance: x / SG: x / pH: x  Gluc: 127 mg/dL / Ketone: x  / Bili: x / Urobili: x   Blood: x / Protein: x / Nitrite: x   Leuk Esterase: x / RBC: x / WBC x   Sq Epi: x / Non Sq Epi: x / Bacteria: x        RADIOLOGY & ADDITIONAL TESTS: Reviewed.

## 2025-02-28 NOTE — PROGRESS NOTE ADULT - SUBJECTIVE AND OBJECTIVE BOX
[facility] HEMATOLOGY/ONCOLOGY INPATIENT PROGRESS NOTE     Interval Hx:   02-28-25: Mr. Gr was seen at bedside today.    Meds:   MEDICATIONS  (STANDING):  albuterol/ipratropium for Nebulization 3 milliLiter(s) Nebulizer every 6 hours  atorvastatin 20 milliGRAM(s) Oral at bedtime  buMETAnide Infusion 4 mG/Hr (20 mL/Hr) IV Continuous <Continuous>  chlorhexidine 0.12% Liquid 15 milliLiter(s) Oral Mucosa every 12 hours  cholecalciferol 1000 Unit(s) Oral daily  escitalopram 15 milliGRAM(s) Oral with breakfast  gabapentin Solution 150 milliGRAM(s) Oral every 12 hours  heparin  Infusion.  Unit(s)/Hr (17 mL/Hr) IV Continuous <Continuous>  influenza  Vaccine (HIGH DOSE) 0.5 milliLiter(s) IntraMuscular once  lacosamide IVPB 200 milliGRAM(s) IV Intermittent every 12 hours  lamoTRIgine 25 milliGRAM(s) Oral two times a day  levETIRAcetam  IVPB 1000 milliGRAM(s) IV Intermittent <User Schedule>  lurasidone 40 milliGRAM(s) Oral at bedtime  naloxegol 12.5 milliGRAM(s) Oral daily  pantoprazole   Suspension 40 milliGRAM(s) Oral daily  piperacillin/tazobactam IVPB.. 3.375 Gram(s) IV Intermittent every 8 hours  polyethylene glycol 3350 17 Gram(s) Oral daily  propofol Infusion 10 MICROgram(s)/kG/Min (5.62 mL/Hr) IV Continuous <Continuous>  senna 2 Tablet(s) Oral at bedtime  sodium chloride 3%  Inhalation 4 milliLiter(s) Inhalation every 6 hours    MEDICATIONS  (PRN):    Vital Signs Last 24 Hrs  T(C): 37.6 (28 Feb 2025 00:00), Max: 37.6 (27 Feb 2025 08:00)  T(F): 99.7 (28 Feb 2025 00:00), Max: 99.7 (27 Feb 2025 08:00)  HR: 94 (28 Feb 2025 04:05) (87 - 97)  BP: 110/59 (28 Feb 2025 03:00) (101/57 - 134/76)  BP(mean): 79 (28 Feb 2025 03:00) (74 - 99)  RR: 28 (28 Feb 2025 03:00) (16 - 29)  SpO2: 94% (28 Feb 2025 04:05) (91% - 100%)    Parameters below as of 28 Feb 2025 00:15  Patient On (Oxygen Delivery Method): ventilator    Physical Exam:  Gen: Intubated  HEENT: EOMI, MMM  Chest: equal chest rise  Cardiac: regular   Abd: non distended   Neuro: sedated     Labs:                        7.5    8.31  )-----------( 104      ( 28 Feb 2025 00:29 )             23.9     CBC Full  -  ( 28 Feb 2025 00:29 )  WBC Count : 8.31 K/uL  RBC Count : 2.43 M/uL  Hemoglobin : 7.5 g/dL  Hematocrit : 23.9 %  Platelet Count - Automated : 104 K/uL  Mean Cell Volume : 98.4 fl  Mean Cell Hemoglobin : 30.9 pg  Mean Cell Hemoglobin Concentration : 31.4 g/dL    02-28    138  |  95[L]  |  98[H]  ----------------------------<  127[H]  3.5   |  21[L]  |  4.01[H]    Ca    9.1      28 Feb 2025 00:29  Phos  7.0     02-28  Mg     2.2     02-28    TPro  7.3  /  Alb  2.9[L]  /  TBili  0.3  /  DBili  x   /  AST  50[H]  /  ALT  54[H]  /  AlkPhos  166[H]  02-28    PT/INR - ( 28 Feb 2025 00:29 )   PT: 11.1 sec;   INR: 0.96 ratio         PTT - ( 28 Feb 2025 00:29 )  PTT:57.9 sec  Bilirubin Total: 0.3 mg/dL (02-28-25 @ 00:29)   [facility] HEMATOLOGY/ONCOLOGY INPATIENT PROGRESS NOTE     Interval Hx:   02-28-25: Mr. Gr was seen at bedside today, continues under the care of MICU, continues intubated, no signs of bleeding, on heparin drip    Meds:   MEDICATIONS  (STANDING):  albuterol/ipratropium for Nebulization 3 milliLiter(s) Nebulizer every 6 hours  atorvastatin 20 milliGRAM(s) Oral at bedtime  buMETAnide Infusion 4 mG/Hr (20 mL/Hr) IV Continuous <Continuous>  chlorhexidine 0.12% Liquid 15 milliLiter(s) Oral Mucosa every 12 hours  cholecalciferol 1000 Unit(s) Oral daily  escitalopram 15 milliGRAM(s) Oral with breakfast  gabapentin Solution 150 milliGRAM(s) Oral every 12 hours  heparin  Infusion.  Unit(s)/Hr (17 mL/Hr) IV Continuous <Continuous>  influenza  Vaccine (HIGH DOSE) 0.5 milliLiter(s) IntraMuscular once  lacosamide IVPB 200 milliGRAM(s) IV Intermittent every 12 hours  lamoTRIgine 25 milliGRAM(s) Oral two times a day  levETIRAcetam  IVPB 1000 milliGRAM(s) IV Intermittent <User Schedule>  lurasidone 40 milliGRAM(s) Oral at bedtime  naloxegol 12.5 milliGRAM(s) Oral daily  pantoprazole   Suspension 40 milliGRAM(s) Oral daily  piperacillin/tazobactam IVPB.. 3.375 Gram(s) IV Intermittent every 8 hours  polyethylene glycol 3350 17 Gram(s) Oral daily  propofol Infusion 10 MICROgram(s)/kG/Min (5.62 mL/Hr) IV Continuous <Continuous>  senna 2 Tablet(s) Oral at bedtime  sodium chloride 3%  Inhalation 4 milliLiter(s) Inhalation every 6 hours    MEDICATIONS  (PRN):    Vital Signs Last 24 Hrs  T(C): 37.6 (28 Feb 2025 00:00), Max: 37.6 (27 Feb 2025 08:00)  T(F): 99.7 (28 Feb 2025 00:00), Max: 99.7 (27 Feb 2025 08:00)  HR: 94 (28 Feb 2025 04:05) (87 - 97)  BP: 110/59 (28 Feb 2025 03:00) (101/57 - 134/76)  BP(mean): 79 (28 Feb 2025 03:00) (74 - 99)  RR: 28 (28 Feb 2025 03:00) (16 - 29)  SpO2: 94% (28 Feb 2025 04:05) (91% - 100%)    Parameters below as of 28 Feb 2025 00:15  Patient On (Oxygen Delivery Method): ventilator    Physical Exam:  Gen: Intubated  HEENT: EOMI, MMM  Chest: equal chest rise  Cardiac: regular   Abd: non distended   Neuro: sedated     Labs:                        7.5    8.31  )-----------( 104      ( 28 Feb 2025 00:29 )             23.9     CBC Full  -  ( 28 Feb 2025 00:29 )  WBC Count : 8.31 K/uL  RBC Count : 2.43 M/uL  Hemoglobin : 7.5 g/dL  Hematocrit : 23.9 %  Platelet Count - Automated : 104 K/uL  Mean Cell Volume : 98.4 fl  Mean Cell Hemoglobin : 30.9 pg  Mean Cell Hemoglobin Concentration : 31.4 g/dL    02-28    138  |  95[L]  |  98[H]  ----------------------------<  127[H]  3.5   |  21[L]  |  4.01[H]    Ca    9.1      28 Feb 2025 00:29  Phos  7.0     02-28  Mg     2.2     02-28    TPro  7.3  /  Alb  2.9[L]  /  TBili  0.3  /  DBili  x   /  AST  50[H]  /  ALT  54[H]  /  AlkPhos  166[H]  02-28    PT/INR - ( 28 Feb 2025 00:29 )   PT: 11.1 sec;   INR: 0.96 ratio         PTT - ( 28 Feb 2025 00:29 )  PTT:57.9 sec  Bilirubin Total: 0.3 mg/dL (02-28-25 @ 00:29)

## 2025-02-28 NOTE — PROGRESS NOTE ADULT - PROBLEM SELECTOR PLAN 3
-CT chest with b/l GGO, new since 1/30/25. Likely COVID PNA +/- superimposed bacterial PNA  -Continue ABX.  2/27 : on antibiotics:  2/28: remains intubated and sedated  on vasopressors:  for septic shock

## 2025-02-28 NOTE — PROGRESS NOTE ADULT - ATTENDING COMMENTS
70 y/o M w/hx as above including metastatic testicular cancer, epilepsy on AEDs, chronic respiratory failure with hypoxia and hypercapnia on home O2, HFpEF admitted for acute on chronic respiratory failure with hypoxia and hypercapnia likely secondary to combination of COVID PNA, bacterial superinfection, and acute pulmonary edema due to acute on chronic HFpEF. Hypotension likely severe sepsis with septic shock. SHAGUFTA likely ATN +/- venous congestion. Repeat CT chest with bilateral GGOs possibly acute pulmonary edema vs early fibrosis vs residual COVID along with L basilar consolidation likely bacterial PNA vs atelectasis.     # Acute on chronic respiratory failure with hypoxia and hypercapnia  # COVID PNA  # Bacterial PNA  # Acute pulmonary edema  # Acute on chronic HFpEF  # Hypotension  # Severe sepsis with septic shock  # SHAGUFTA  # Hypernatremia  # Epilepsy    - Continue AEDs  - Sedation as needed goal RASS -1 to 0  - Wean from vent as tolerated  - Restart pressors if needed  - Trend Cr, avoid nephrotoxins  - Complete course of abx  - Completed course of dexamethasone for COVID, completed remdesivir  - Hypernatremia resolved  - DVT prophylaxis

## 2025-02-28 NOTE — PROGRESS NOTE ADULT - PROBLEM SELECTOR PLAN 7
Continue with home atorvastatin 20 mg bedtime for HLD, vitamin D supplementation, pantoprazole 40 mg daily, senna 2 tab bedtime, and Miralax 17 gram daily.     General  - DVT prophylaxis: heparin 5000 units q8h --> hep gtt per ICU  - Diet: regular   - Medication reconciliation: complete   - Code: Full   - Medications: Tylenol 650 mg q6h as needed for pain, Maalox 30 mL q4h as needed for acid reflux, melatonin 3 mg bedtime as needed for insomnia, Zofran 4 mg IV q8h as needed for nausea/vomiting      2/16/25 --> plan was discussed with patient's brother Fernando (over the phone, [phone]) in detail. He agrees with plan. All questions answered. He asked that I update Ester (sister, [phone]); she was called and updated as well, also agrees with the plan, also answered all questions.

## 2025-02-28 NOTE — PROGRESS NOTE ADULT - NSPROGADDITIONALINFOA_GEN_ALL_CORE
speech & swallow eval appreciated, s/p FEES: recommends thicken pureed diet.  events reviewed, RRT for hypoxia, intubated/sedated for increased work of breathing.   transferred to ICU. ICU eval appreciated  GOC discussed, full code per the sister.   Transferred to ICU, remain intubated.   c/w Bumex gtt for overload. weaned off pressors.   completed abx, completed antiviral.   sedation vacation and weaning trial per ICU team.  ICU care appreciated.     - Dr. SHIRA Leroy ([facility])  - [phone]

## 2025-02-28 NOTE — PROGRESS NOTE ADULT - PROBLEM SELECTOR PLAN 1
? superimposed PNA on top of COVID-19?  - Zosyn 4.5 g q12h, renally dosed, completed  - Vancomycin discontinued  - Decadron 6mg IVPB daily x 10 days for COVID-19   - completed Remdesivir IV   - Maintain O2 sats above 92%  - BiPaP daytime prn   - BiPaP at bedtime   - Blood cultures x 2 (-) so far  - MRSA swab negative  - Sputum culture --> few gram variable rods and rare G (+) cocci in pairs  - Legionella Ur Ag (-)  - Streptococcus pneumoniae neg  - Appreciate pulmonology  - bumex gtt per ICU team, monitor urine output  - repeat CT chest reviewed: upper lobe groundglass opacities have improved, there are new and worsening confluent areas of consolidation/pneumonia in the mid to lower lungs  - ID follow up appreciated

## 2025-03-01 LAB
ALBUMIN SERPL ELPH-MCNC: 2.8 G/DL — LOW (ref 3.3–5)
ALP SERPL-CCNC: 148 U/L — HIGH (ref 40–120)
ALT FLD-CCNC: 47 U/L — HIGH (ref 10–45)
ANION GAP SERPL CALC-SCNC: 23 MMOL/L — HIGH (ref 5–17)
ANION GAP SERPL CALC-SCNC: 24 MMOL/L — HIGH (ref 5–17)
APTT BLD: 63.8 SEC — HIGH (ref 24.5–35.6)
AST SERPL-CCNC: 37 U/L — SIGNIFICANT CHANGE UP (ref 10–40)
BASOPHILS # BLD AUTO: 0.01 K/UL — SIGNIFICANT CHANGE UP (ref 0–0.2)
BASOPHILS NFR BLD AUTO: 0.1 % — SIGNIFICANT CHANGE UP (ref 0–2)
BILIRUB SERPL-MCNC: 0.3 MG/DL — SIGNIFICANT CHANGE UP (ref 0.2–1.2)
BUN SERPL-MCNC: 100 MG/DL — HIGH (ref 7–23)
BUN SERPL-MCNC: 104 MG/DL — HIGH (ref 7–23)
CALCIUM SERPL-MCNC: 9.1 MG/DL — SIGNIFICANT CHANGE UP (ref 8.4–10.5)
CALCIUM SERPL-MCNC: 9.4 MG/DL — SIGNIFICANT CHANGE UP (ref 8.4–10.5)
CHLORIDE SERPL-SCNC: 94 MMOL/L — LOW (ref 96–108)
CHLORIDE SERPL-SCNC: 94 MMOL/L — LOW (ref 96–108)
CO2 SERPL-SCNC: 21 MMOL/L — LOW (ref 22–31)
CO2 SERPL-SCNC: 22 MMOL/L — SIGNIFICANT CHANGE UP (ref 22–31)
CREAT SERPL-MCNC: 4.24 MG/DL — HIGH (ref 0.5–1.3)
CREAT SERPL-MCNC: 4.28 MG/DL — HIGH (ref 0.5–1.3)
EGFR: 14 ML/MIN/1.73M2 — LOW
EGFR: 14 ML/MIN/1.73M2 — LOW
EGFR: 15 ML/MIN/1.73M2 — LOW
EGFR: 15 ML/MIN/1.73M2 — LOW
EOSINOPHIL # BLD AUTO: 0.13 K/UL — SIGNIFICANT CHANGE UP (ref 0–0.5)
EOSINOPHIL NFR BLD AUTO: 1.8 % — SIGNIFICANT CHANGE UP (ref 0–6)
GAS PNL BLDA: SIGNIFICANT CHANGE UP
GLUCOSE BLDC GLUCOMTR-MCNC: 148 MG/DL — HIGH (ref 70–99)
GLUCOSE BLDC GLUCOMTR-MCNC: 154 MG/DL — HIGH (ref 70–99)
GLUCOSE SERPL-MCNC: 137 MG/DL — HIGH (ref 70–99)
GLUCOSE SERPL-MCNC: 158 MG/DL — HIGH (ref 70–99)
HCT VFR BLD CALC: 23.3 % — LOW (ref 39–50)
HGB BLD-MCNC: 7.4 G/DL — LOW (ref 13–17)
IMM GRANULOCYTES NFR BLD AUTO: 4.3 % — HIGH (ref 0–0.9)
INR BLD: 0.99 RATIO — SIGNIFICANT CHANGE UP (ref 0.85–1.16)
LYMPHOCYTES # BLD AUTO: 0.6 K/UL — LOW (ref 1–3.3)
LYMPHOCYTES # BLD AUTO: 8.2 % — LOW (ref 13–44)
MAGNESIUM SERPL-MCNC: 2.5 MG/DL — SIGNIFICANT CHANGE UP (ref 1.6–2.6)
MAGNESIUM SERPL-MCNC: 2.6 MG/DL — SIGNIFICANT CHANGE UP (ref 1.6–2.6)
MCHC RBC-ENTMCNC: 31.8 G/DL — LOW (ref 32–36)
MCHC RBC-ENTMCNC: 31.9 PG — SIGNIFICANT CHANGE UP (ref 27–34)
MCV RBC AUTO: 100.4 FL — HIGH (ref 80–100)
MONOCYTES # BLD AUTO: 0.36 K/UL — SIGNIFICANT CHANGE UP (ref 0–0.9)
MONOCYTES NFR BLD AUTO: 4.9 % — SIGNIFICANT CHANGE UP (ref 2–14)
NEUTROPHILS # BLD AUTO: 5.94 K/UL — SIGNIFICANT CHANGE UP (ref 1.8–7.4)
NEUTROPHILS NFR BLD AUTO: 80.7 % — HIGH (ref 43–77)
NRBC BLD AUTO-RTO: 0 /100 WBCS — SIGNIFICANT CHANGE UP (ref 0–0)
PHOSPHATE SERPL-MCNC: 8.9 MG/DL — HIGH (ref 2.5–4.5)
PHOSPHATE SERPL-MCNC: 9.3 MG/DL — HIGH (ref 2.5–4.5)
PLATELET # BLD AUTO: 101 K/UL — LOW (ref 150–400)
POTASSIUM SERPL-MCNC: 3.6 MMOL/L — SIGNIFICANT CHANGE UP (ref 3.5–5.3)
POTASSIUM SERPL-MCNC: 3.7 MMOL/L — SIGNIFICANT CHANGE UP (ref 3.5–5.3)
POTASSIUM SERPL-SCNC: 3.6 MMOL/L — SIGNIFICANT CHANGE UP (ref 3.5–5.3)
POTASSIUM SERPL-SCNC: 3.7 MMOL/L — SIGNIFICANT CHANGE UP (ref 3.5–5.3)
PROT SERPL-MCNC: 7.4 G/DL — SIGNIFICANT CHANGE UP (ref 6–8.3)
PROTHROM AB SERPL-ACNC: 11.3 SEC — SIGNIFICANT CHANGE UP (ref 9.9–13.4)
RBC # BLD: 2.32 M/UL — LOW (ref 4.2–5.8)
RBC # FLD: 15.5 % — HIGH (ref 10.3–14.5)
SODIUM SERPL-SCNC: 139 MMOL/L — SIGNIFICANT CHANGE UP (ref 135–145)
SODIUM SERPL-SCNC: 139 MMOL/L — SIGNIFICANT CHANGE UP (ref 135–145)
WBC # BLD: 7.36 K/UL — SIGNIFICANT CHANGE UP (ref 3.8–10.5)
WBC # FLD AUTO: 7.36 K/UL — SIGNIFICANT CHANGE UP (ref 3.8–10.5)

## 2025-03-01 PROCEDURE — 70450 CT HEAD/BRAIN W/O DYE: CPT | Mod: 26

## 2025-03-01 PROCEDURE — 99291 CRITICAL CARE FIRST HOUR: CPT

## 2025-03-01 RX ORDER — HEPARIN SODIUM 1000 [USP'U]/ML
1700 INJECTION INTRAVENOUS; SUBCUTANEOUS
Qty: 25000 | Refills: 0 | Status: DISCONTINUED | OUTPATIENT
Start: 2025-03-01 | End: 2025-03-03

## 2025-03-01 RX ORDER — PROPOFOL 10 MG/ML
10 INJECTION, EMULSION INTRAVENOUS
Qty: 1000 | Refills: 0 | Status: DISCONTINUED | OUTPATIENT
Start: 2025-03-01 | End: 2025-03-03

## 2025-03-01 RX ORDER — SEVELAMER HYDROCHLORIDE 800 MG/1
1600 TABLET ORAL EVERY 8 HOURS
Refills: 0 | Status: DISCONTINUED | OUTPATIENT
Start: 2025-03-01 | End: 2025-03-04

## 2025-03-01 RX ORDER — HEPARIN SODIUM 1000 [USP'U]/ML
3500 INJECTION INTRAVENOUS; SUBCUTANEOUS EVERY 6 HOURS
Refills: 0 | Status: DISCONTINUED | OUTPATIENT
Start: 2025-03-01 | End: 2025-03-03

## 2025-03-01 RX ORDER — SEVELAMER HYDROCHLORIDE 800 MG/1
1600 TABLET ORAL EVERY 8 HOURS
Refills: 0 | Status: DISCONTINUED | OUTPATIENT
Start: 2025-03-01 | End: 2025-03-01

## 2025-03-01 RX ORDER — HEPARIN SODIUM 1000 [USP'U]/ML
7000 INJECTION INTRAVENOUS; SUBCUTANEOUS EVERY 6 HOURS
Refills: 0 | Status: DISCONTINUED | OUTPATIENT
Start: 2025-03-01 | End: 2025-03-03

## 2025-03-01 RX ADMIN — LACOSAMIDE 140 MILLIGRAM(S): 150 TABLET, FILM COATED ORAL at 05:17

## 2025-03-01 RX ADMIN — Medication 15 MILLILITER(S): at 05:17

## 2025-03-01 RX ADMIN — ATORVASTATIN CALCIUM 20 MILLIGRAM(S): 80 TABLET, FILM COATED ORAL at 21:02

## 2025-03-01 RX ADMIN — LACOSAMIDE 140 MILLIGRAM(S): 150 TABLET, FILM COATED ORAL at 17:21

## 2025-03-01 RX ADMIN — PROPOFOL 5.62 MICROGRAM(S)/KG/MIN: 10 INJECTION, EMULSION INTRAVENOUS at 17:37

## 2025-03-01 RX ADMIN — Medication 40 MILLIGRAM(S): at 11:27

## 2025-03-01 RX ADMIN — HEPARIN SODIUM 1700 UNIT(S)/HR: 1000 INJECTION INTRAVENOUS; SUBCUTANEOUS at 21:34

## 2025-03-01 RX ADMIN — Medication 1000 UNIT(S): at 11:27

## 2025-03-01 RX ADMIN — LEVETIRACETAM 750 MILLIGRAM(S): 10 INJECTION, SOLUTION INTRAVENOUS at 17:37

## 2025-03-01 RX ADMIN — Medication 15 MILLILITER(S): at 17:28

## 2025-03-01 RX ADMIN — Medication 4 MILLILITER(S): at 23:44

## 2025-03-01 RX ADMIN — SEVELAMER HYDROCHLORIDE 1600 MILLIGRAM(S): 800 TABLET ORAL at 21:03

## 2025-03-01 RX ADMIN — Medication 4 MILLILITER(S): at 05:23

## 2025-03-01 RX ADMIN — LAMOTRIGINE 25 MILLIGRAM(S): 150 TABLET ORAL at 05:18

## 2025-03-01 RX ADMIN — PROPOFOL 5.62 MICROGRAM(S)/KG/MIN: 10 INJECTION, EMULSION INTRAVENOUS at 19:21

## 2025-03-01 RX ADMIN — Medication 667 MILLIGRAM(S): at 05:18

## 2025-03-01 RX ADMIN — LEVETIRACETAM 750 MILLIGRAM(S): 10 INJECTION, SOLUTION INTRAVENOUS at 05:14

## 2025-03-01 RX ADMIN — NALOXEGOL OXALATE 12.5 MILLIGRAM(S): 12.5 TABLET, FILM COATED ORAL at 11:27

## 2025-03-01 RX ADMIN — Medication 4 MILLILITER(S): at 11:59

## 2025-03-01 RX ADMIN — IPRATROPIUM BROMIDE AND ALBUTEROL SULFATE 3 MILLILITER(S): .5; 2.5 SOLUTION RESPIRATORY (INHALATION) at 17:37

## 2025-03-01 RX ADMIN — IPRATROPIUM BROMIDE AND ALBUTEROL SULFATE 3 MILLILITER(S): .5; 2.5 SOLUTION RESPIRATORY (INHALATION) at 11:55

## 2025-03-01 RX ADMIN — SEVELAMER HYDROCHLORIDE 1600 MILLIGRAM(S): 800 TABLET ORAL at 14:49

## 2025-03-01 RX ADMIN — LURASIDONE HYDROCHLORIDE 40 MILLIGRAM(S): 120 TABLET, FILM COATED ORAL at 21:03

## 2025-03-01 RX ADMIN — IPRATROPIUM BROMIDE AND ALBUTEROL SULFATE 3 MILLILITER(S): .5; 2.5 SOLUTION RESPIRATORY (INHALATION) at 05:21

## 2025-03-01 RX ADMIN — BUMETANIDE 5 MG/HR: 1 TABLET ORAL at 19:20

## 2025-03-01 RX ADMIN — ESCITALOPRAM OXALATE 15 MILLIGRAM(S): 20 TABLET ORAL at 07:23

## 2025-03-01 RX ADMIN — GABAPENTIN 150 MILLIGRAM(S): 400 CAPSULE ORAL at 17:20

## 2025-03-01 RX ADMIN — GABAPENTIN 150 MILLIGRAM(S): 400 CAPSULE ORAL at 05:19

## 2025-03-01 RX ADMIN — Medication 5 MILLIGRAM(S): at 22:20

## 2025-03-01 RX ADMIN — Medication 4 MILLILITER(S): at 17:38

## 2025-03-01 RX ADMIN — POLYETHYLENE GLYCOL 3350 17 GRAM(S): 17 POWDER, FOR SOLUTION ORAL at 11:28

## 2025-03-01 RX ADMIN — Medication 0.1 MILLIGRAM(S): at 23:31

## 2025-03-01 RX ADMIN — IPRATROPIUM BROMIDE AND ALBUTEROL SULFATE 3 MILLILITER(S): .5; 2.5 SOLUTION RESPIRATORY (INHALATION) at 23:44

## 2025-03-01 RX ADMIN — LAMOTRIGINE 25 MILLIGRAM(S): 150 TABLET ORAL at 17:29

## 2025-03-01 NOTE — PROGRESS NOTE ADULT - ASSESSMENT
70 y/o M with PMH of HTN, HLD, VAZQUEZ on CPAP and home O2 (2LNC daytime), CKD, epilepsy, schizophrenia developmental delay, metastatic testicular CA. Recent admission at [facility] for acute respiratory failure in the setting of seizures, influenza infection, pulmonary edema, PNA requiring intubation in MICU. Was eventually discharged to rehab. Presents now with SOB, found to be COVID + at facility on 2/13. Febrile on admission to ED to 101.9F. Placed on Bipap for increased WOB. Pulmonary called to consult for acute respiratory failure.

## 2025-03-01 NOTE — PROGRESS NOTE ADULT - SUBJECTIVE AND OBJECTIVE BOX
SUBJECTIVE/ OVERNIGHT EVENTS:  seen and examined in MICU  intubated  on sedation vacation  awake  denied pain  weak      --------------------------------------------------------------------------------------------  LABS:                        7.4    7.36  )-----------( 101      ( 01 Mar 2025 00:50 )             23.3     03-01    139  |  94[L]  |  100[H]  ----------------------------<  158[H]  3.7   |  21[L]  |  4.24[H]    Ca    9.1      01 Mar 2025 00:50  Phos  8.9     03-01  Mg     2.5     03-01    TPro  7.4  /  Alb  2.8[L]  /  TBili  0.3  /  DBili  x   /  AST  37  /  ALT  47[H]  /  AlkPhos  148[H]  03-01    PT/INR - ( 01 Mar 2025 00:51 )   PT: 11.3 sec;   INR: 0.99 ratio         PTT - ( 01 Mar 2025 00:51 )  PTT:63.8 sec  CAPILLARY BLOOD GLUCOSE      POCT Blood Glucose.: 185 mg/dL (28 Feb 2025 11:25)        Urinalysis Basic - ( 01 Mar 2025 00:50 )    Color: x / Appearance: x / SG: x / pH: x  Gluc: 158 mg/dL / Ketone: x  / Bili: x / Urobili: x   Blood: x / Protein: x / Nitrite: x   Leuk Esterase: x / RBC: x / WBC x   Sq Epi: x / Non Sq Epi: x / Bacteria: x        RADIOLOGY & ADDITIONAL TESTS:    Imaging Personally Reviewed:  [x] YES  [ ] NO    Consultant(s) Notes Reviewed:  [x] YES  [ ] NO    MEDICATIONS  (STANDING):  albuterol/ipratropium for Nebulization 3 milliLiter(s) Nebulizer every 6 hours  atorvastatin 20 milliGRAM(s) Oral at bedtime  buMETAnide Infusion 1 mG/Hr (5 mL/Hr) IV Continuous <Continuous>  chlorhexidine 0.12% Liquid 15 milliLiter(s) Oral Mucosa every 12 hours  cholecalciferol 1000 Unit(s) Oral daily  dexMEDEtomidine Infusion 0.2 MICROgram(s)/kG/Hr (4.68 mL/Hr) IV Continuous <Continuous>  escitalopram 15 milliGRAM(s) Oral with breakfast  gabapentin Solution 150 milliGRAM(s) Oral every 12 hours  heparin  Infusion.  Unit(s)/Hr (17 mL/Hr) IV Continuous <Continuous>  influenza  Vaccine (HIGH DOSE) 0.5 milliLiter(s) IntraMuscular once  lacosamide IVPB 200 milliGRAM(s) IV Intermittent every 12 hours  lamoTRIgine 25 milliGRAM(s) Oral two times a day  levETIRAcetam   Injectable 750 milliGRAM(s) IV Push every 12 hours  lurasidone 40 milliGRAM(s) Oral at bedtime  naloxegol 12.5 milliGRAM(s) Oral daily  pantoprazole   Suspension 40 milliGRAM(s) Oral daily  polyethylene glycol 3350 17 Gram(s) Oral daily  senna 2 Tablet(s) Oral at bedtime  sevelamer carbonate 1600 milliGRAM(s) Oral every 8 hours  sodium chloride 3%  Inhalation 4 milliLiter(s) Inhalation every 6 hours    MEDICATIONS  (PRN):      Care Discussed with Consultants/Other Providers [x] YES  [ ] NO    Vital Signs Last 24 Hrs  T(C): 37.5 (01 Mar 2025 07:00), Max: 37.8 (01 Mar 2025 00:00)  T(F): 99.5 (01 Mar 2025 07:00), Max: 100 (01 Mar 2025 00:00)  HR: 91 (01 Mar 2025 09:07) (80 - 100)  BP: 145/67 (01 Mar 2025 08:00) (91/53 - 145/67)  BP(mean): 96 (01 Mar 2025 08:00) (66 - 97)  RR: 28 (01 Mar 2025 08:00) (23 - 31)  SpO2: 100% (01 Mar 2025 09:07) (95% - 100%)    Parameters below as of 01 Mar 2025 05:31  Patient On (Oxygen Delivery Method): ventilator      I&O's Summary    28 Feb 2025 07:01  -  01 Mar 2025 07:00  --------------------------------------------------------  IN: 2331 mL / OUT: 1950 mL / NET: 381 mL    01 Mar 2025 07:01  -  01 Mar 2025 10:27  --------------------------------------------------------  IN: 120 mL / OUT: 155 mL / NET: -35 mL            PHYSICAL EXAM:  GENERAL: intubated, on sedation vacation  HEAD:  Atraumatic, Normocephalic  EYES: EOMI, PERRLA, conjunctiva and sclera clear  NECK: Supple, No JVD  CHEST/LUNG: mild decrease breath sounds bilaterally; No wheeze   HEART: Regular rate and rhythm; No murmurs, rubs, or gallops  ABDOMEN: Soft, Nontender, Nondistended; Bowel sounds present  Neuro:  intubated, on sedation vacation  EXTREMITIES:  2+ Peripheral Pulses, No clubbing, cyanosis, or edema  SKIN: No rashes or lesions

## 2025-03-01 NOTE — OCCUPATIONAL THERAPY INITIAL EVALUATION ADULT - NSOTDISCHREC_GEN_A_CORE
TBD pending hospital course and functional eval. Pt will likely need to return to Sub-acute rehab. no Sub-acute Rehab

## 2025-03-01 NOTE — PROGRESS NOTE ADULT - PROBLEM SELECTOR PLAN 7
Continue with home atorvastatin 20 mg bedtime for HLD, vitamin D supplementation, pantoprazole 40 mg daily, senna 2 tab bedtime, and Miralax 17 gram daily.     General  - DVT prophylaxis: heparin 5000 units q8h --> hep gtt per ICU  - Diet: regular   - Medication reconciliation: complete   - Code: Full   - Medications: Tylenol 650 mg q6h as needed for pain, Maalox 30 mL q4h as needed for acid reflux, melatonin 3 mg bedtime as needed for insomnia, Zofran 4 mg IV q8h as needed for nausea/vomiting      2/16/25 --> plan was discussed with patient's brother Fernando (over the phone, [phone]) in detail. He agrees with plan. All questions answered. He asked that I update Ester (sister, [phone]); she was called and updated as well, also agrees with the plan, also answered all questions.

## 2025-03-01 NOTE — PROGRESS NOTE ADULT - ASSESSMENT
69 year old male with a past medical history of hypertension, hyperlipemia VAZQUEZ (on CPAP at bedtime, NC 2 liters during the day), CKD stage 3, epilepsy, schizophrenia, developmental delay, metastatic testicular cancer (s/p orchiectomy, actively on chemotherapy, last dose of etoposide/cisplatin on 6/2024) presenting with worsening shortness of breath. Patient was recently hospitalized at [facility] from January 27th 2025 to February 6th 2025 for seizure and influenza virus infection, which required intubation. He was sent to rehab (originally from home) from hospital. He returns due to sudden onset shortness of breath and worsening oxygenation. Of note, he tested positive for COVID-19       1- SHAGUFTA on CKDIII  2- covid-19  3- anemia  4- hypotension  bp is better   5- respiratory failure   6- chf       SHAGUFTA suspected in setting of new infection. covid 19   cr seems to be reaching a plateau  bun is higher   decrease bumex to 1mg/hr and lower   hyperphosphatemia  change phoslo to renvela 1600 mg tid feeding tube   suspect superimposed pna tx with zosyn 3.375 g iv bid   anemia, trend hgb  d.w icu team when seen earlier

## 2025-03-01 NOTE — PROGRESS NOTE ADULT - ATTENDING COMMENTS
70 y/o M w/hx as above including metastatic testicular cancer, epilepsy on AEDs, chronic respiratory failure with hypoxia and hypercapnia on home O2, HFpEF admitted for acute on chronic respiratory failure with hypoxia and hypercapnia likely secondary to combination of COVID PNA, bacterial superinfection, and acute pulmonary edema due to acute on chronic HFpEF. Hypotension likely severe sepsis with septic shock. SHAGUFTA likely ATN +/- venous congestion. Repeat CT chest with bilateral GGOs possibly acute pulmonary edema vs early fibrosis vs residual COVID along with L basilar consolidation likely bacterial PNA vs atelectasis.     # Acute on chronic respiratory failure with hypoxia and hypercapnia  # COVID PNA  # Bacterial PNA  # Acute pulmonary edema  # Acute on chronic HFpEF  # Hypotension  # Severe sepsis with septic shock  # SHAGUFTA  # Hypernatremia  # Epilepsy    - blankly staring/no response RUE flaccid, no response in LUE/LEs  - hold Heparin gtt and check Head CT  - depending on Head CT findings may need VEEG monitoring  - Continue AEDs  - Wean from vent as tolerated  - Restart pressors if needed  - Trend Cr, avoid nephrotoxins, continue Bumex gtt  - Completed course of abx 2/28  - Completed course of dexamethasone for COVID, completed remdesivir  - Duonebs + Hypersal for ACT  - DVT prophylaxis

## 2025-03-01 NOTE — PROGRESS NOTE ADULT - SUBJECTIVE AND OBJECTIVE BOX
*******************************  Navya Michelle PGY-3  *******************************    INTERVAL HPI/OVERNIGHT EVENTS:    SUBJECTIVE: Patient seen and examined at bedside.     OBJECTIVE:    VITAL SIGNS:  ICU Vital Signs Last 24 Hrs  T(C): 37.5 (01 Mar 2025 07:00), Max: 37.8 (01 Mar 2025 00:00)  T(F): 99.5 (01 Mar 2025 07:00), Max: 100 (01 Mar 2025 00:00)  HR: 80 (01 Mar 2025 07:00) (80 - 100)  BP: 113/56 (01 Mar 2025 07:00) (91/53 - 138/69)  BP(mean): 80 (01 Mar 2025 07:00) (66 - 97)  ABP: --  ABP(mean): --  RR: 28 (01 Mar 2025 07:00) (23 - 31)  SpO2: 97% (01 Mar 2025 07:00) (95% - 100%)    O2 Parameters below as of 01 Mar 2025 05:31  Patient On (Oxygen Delivery Method): ventilator          Mode: AC/ CMV (Assist Control/ Continuous Mandatory Ventilation), RR (machine): 16, TV (machine): 450, FiO2: 40, PEEP: 6, ITime: 1, MAP: 12, PIP: 18    02-28 @ 07:01 - 03-01 @ 07:00  --------------------------------------------------------  IN: 2338 mL / OUT: 1950 mL / NET: 388 mL    03-01 @ 07:01 - 03-01 @ 07:45  --------------------------------------------------------  IN: 27 mL / OUT: 0 mL / NET: 27 mL      CAPILLARY BLOOD GLUCOSE      POCT Blood Glucose.: 185 mg/dL (28 Feb 2025 11:25)        PHYSICAL EXAM:  GENERAL: NAD, well-developed  HEAD:  Atraumatic, Normocephalic  EYES: EOMI, PERRLA, conjunctiva and sclera clear  NECK: Supple, No JVD  CHEST/LUNG: Clear to auscultation bilaterally; No wheeze  HEART: Regular rate and rhythm; No murmurs, rubs, or gallops  ABDOMEN: Soft, Nontender, Nondistended; Bowel sounds present  EXTREMITIES:  2+ Peripheral Pulses, No clubbing, cyanosis, or edema  PSYCH: AAOx3  NEUROLOGY: non-focal  SKIN: No rashes or lesions  Lines:      MEDICATIONS:  MEDICATIONS  (STANDING):  albuterol/ipratropium for Nebulization 3 milliLiter(s) Nebulizer every 6 hours  atorvastatin 20 milliGRAM(s) Oral at bedtime  buMETAnide Infusion 1 mG/Hr (5 mL/Hr) IV Continuous <Continuous>  calcium acetate 667 milliGRAM(s) Oral every 8 hours  chlorhexidine 0.12% Liquid 15 milliLiter(s) Oral Mucosa every 12 hours  cholecalciferol 1000 Unit(s) Oral daily  dexMEDEtomidine Infusion 0.2 MICROgram(s)/kG/Hr (4.68 mL/Hr) IV Continuous <Continuous>  escitalopram 15 milliGRAM(s) Oral with breakfast  gabapentin Solution 150 milliGRAM(s) Oral every 12 hours  heparin  Infusion.  Unit(s)/Hr (17 mL/Hr) IV Continuous <Continuous>  influenza  Vaccine (HIGH DOSE) 0.5 milliLiter(s) IntraMuscular once  lacosamide IVPB 200 milliGRAM(s) IV Intermittent every 12 hours  lamoTRIgine 25 milliGRAM(s) Oral two times a day  levETIRAcetam   Injectable 750 milliGRAM(s) IV Push every 12 hours  lurasidone 40 milliGRAM(s) Oral at bedtime  naloxegol 12.5 milliGRAM(s) Oral daily  pantoprazole   Suspension 40 milliGRAM(s) Oral daily  polyethylene glycol 3350 17 Gram(s) Oral daily  senna 2 Tablet(s) Oral at bedtime  sodium chloride 3%  Inhalation 4 milliLiter(s) Inhalation every 6 hours    MEDICATIONS  (PRN):      ALLERGIES:  Allergies    seasonal allergies (Unknown)  penicillin (Hives)    Intolerances    latex (Rash)      LABS:                        7.4    7.36  )-----------( 101      ( 01 Mar 2025 00:50 )             23.3     03-01    139  |  94[L]  |  100[H]  ----------------------------<  158[H]  3.7   |  21[L]  |  4.24[H]    Ca    9.1      01 Mar 2025 00:50  Phos  8.9     03-01  Mg     2.5     03-01    TPro  7.4  /  Alb  2.8[L]  /  TBili  0.3  /  DBili  x   /  AST  37  /  ALT  47[H]  /  AlkPhos  148[H]  03-01    PT/INR - ( 01 Mar 2025 00:51 )   PT: 11.3 sec;   INR: 0.99 ratio         PTT - ( 01 Mar 2025 00:51 )  PTT:63.8 sec  Urinalysis Basic - ( 01 Mar 2025 00:50 )    Color: x / Appearance: x / SG: x / pH: x  Gluc: 158 mg/dL / Ketone: x  / Bili: x / Urobili: x   Blood: x / Protein: x / Nitrite: x   Leuk Esterase: x / RBC: x / WBC x   Sq Epi: x / Non Sq Epi: x / Bacteria: x        RADIOLOGY & ADDITIONAL TESTS: Reviewed.     *******************************  Navya Michelle PGY-3  *******************************    INTERVAL HPI/OVERNIGHT EVENTS: No acute events overnight.    SUBJECTIVE: Patient seen and examined at bedside. Tracking but not following commands or engaging in interview    OBJECTIVE:    VITAL SIGNS:  ICU Vital Signs Last 24 Hrs  T(C): 37.5 (01 Mar 2025 07:00), Max: 37.8 (01 Mar 2025 00:00)  T(F): 99.5 (01 Mar 2025 07:00), Max: 100 (01 Mar 2025 00:00)  HR: 80 (01 Mar 2025 07:00) (80 - 100)  BP: 113/56 (01 Mar 2025 07:00) (91/53 - 138/69)  BP(mean): 80 (01 Mar 2025 07:00) (66 - 97)  ABP: --  ABP(mean): --  RR: 28 (01 Mar 2025 07:00) (23 - 31)  SpO2: 97% (01 Mar 2025 07:00) (95% - 100%)    O2 Parameters below as of 01 Mar 2025 05:31  Patient On (Oxygen Delivery Method): ventilator          Mode: AC/ CMV (Assist Control/ Continuous Mandatory Ventilation), RR (machine): 16, TV (machine): 450, FiO2: 40, PEEP: 6, ITime: 1, MAP: 12, PIP: 18    02-28 @ 07:01 - 03-01 @ 07:00  --------------------------------------------------------  IN: 2338 mL / OUT: 1950 mL / NET: 388 mL    03-01 @ 07:01 - 03-01 @ 07:45  --------------------------------------------------------  IN: 27 mL / OUT: 0 mL / NET: 27 mL      CAPILLARY BLOOD GLUCOSE      POCT Blood Glucose.: 185 mg/dL (28 Feb 2025 11:25)        PHYSICAL EXAM:  GENERAL: Intubated, sedated but tracking  HEENT: NCAT  NECK: supple without JVD  CHEST/LUNG: mechanical breath sounds bilaterally  CARDIOVASCULAR: regular rate and rhythm, normal S1 and S2, no murmur/rub/gallop  ABDOMEN: positive bowel sounds, non-distended, no organomegaly   EXTREMITIES: no lower extremity edema, b/l upper extremities edematous, though improved in appearance  NEUROLOGY: sedated    MEDICATIONS:  MEDICATIONS  (STANDING):  albuterol/ipratropium for Nebulization 3 milliLiter(s) Nebulizer every 6 hours  atorvastatin 20 milliGRAM(s) Oral at bedtime  buMETAnide Infusion 1 mG/Hr (5 mL/Hr) IV Continuous <Continuous>  calcium acetate 667 milliGRAM(s) Oral every 8 hours  chlorhexidine 0.12% Liquid 15 milliLiter(s) Oral Mucosa every 12 hours  cholecalciferol 1000 Unit(s) Oral daily  dexMEDEtomidine Infusion 0.2 MICROgram(s)/kG/Hr (4.68 mL/Hr) IV Continuous <Continuous>  escitalopram 15 milliGRAM(s) Oral with breakfast  gabapentin Solution 150 milliGRAM(s) Oral every 12 hours  heparin  Infusion.  Unit(s)/Hr (17 mL/Hr) IV Continuous <Continuous>  influenza  Vaccine (HIGH DOSE) 0.5 milliLiter(s) IntraMuscular once  lacosamide IVPB 200 milliGRAM(s) IV Intermittent every 12 hours  lamoTRIgine 25 milliGRAM(s) Oral two times a day  levETIRAcetam   Injectable 750 milliGRAM(s) IV Push every 12 hours  lurasidone 40 milliGRAM(s) Oral at bedtime  naloxegol 12.5 milliGRAM(s) Oral daily  pantoprazole   Suspension 40 milliGRAM(s) Oral daily  polyethylene glycol 3350 17 Gram(s) Oral daily  senna 2 Tablet(s) Oral at bedtime  sodium chloride 3%  Inhalation 4 milliLiter(s) Inhalation every 6 hours    MEDICATIONS  (PRN):      ALLERGIES:  Allergies    seasonal allergies (Unknown)  penicillin (Hives)    Intolerances    latex (Rash)      LABS:                        7.4    7.36  )-----------( 101      ( 01 Mar 2025 00:50 )             23.3     03-01    139  |  94[L]  |  100[H]  ----------------------------<  158[H]  3.7   |  21[L]  |  4.24[H]    Ca    9.1      01 Mar 2025 00:50  Phos  8.9     03-01  Mg     2.5     03-01    TPro  7.4  /  Alb  2.8[L]  /  TBili  0.3  /  DBili  x   /  AST  37  /  ALT  47[H]  /  AlkPhos  148[H]  03-01    PT/INR - ( 01 Mar 2025 00:51 )   PT: 11.3 sec;   INR: 0.99 ratio         PTT - ( 01 Mar 2025 00:51 )  PTT:63.8 sec  Urinalysis Basic - ( 01 Mar 2025 00:50 )    Color: x / Appearance: x / SG: x / pH: x  Gluc: 158 mg/dL / Ketone: x  / Bili: x / Urobili: x   Blood: x / Protein: x / Nitrite: x   Leuk Esterase: x / RBC: x / WBC x   Sq Epi: x / Non Sq Epi: x / Bacteria: x        RADIOLOGY & ADDITIONAL TESTS: Reviewed.     *******************************  Navya Michelle PGY-3  *******************************    INTERVAL HPI/OVERNIGHT EVENTS: No acute events overnight.    SUBJECTIVE: Patient seen and examined at bedside. Tracking but not following commands or engaging in interview    At time of team rounds, pt following commands with L upper extremity and b/l lower extremities but not with R upper extremity    OBJECTIVE:    VITAL SIGNS:  ICU Vital Signs Last 24 Hrs  T(C): 37.5 (01 Mar 2025 07:00), Max: 37.8 (01 Mar 2025 00:00)  T(F): 99.5 (01 Mar 2025 07:00), Max: 100 (01 Mar 2025 00:00)  HR: 80 (01 Mar 2025 07:00) (80 - 100)  BP: 113/56 (01 Mar 2025 07:00) (91/53 - 138/69)  BP(mean): 80 (01 Mar 2025 07:00) (66 - 97)  ABP: --  ABP(mean): --  RR: 28 (01 Mar 2025 07:00) (23 - 31)  SpO2: 97% (01 Mar 2025 07:00) (95% - 100%)    O2 Parameters below as of 01 Mar 2025 05:31  Patient On (Oxygen Delivery Method): ventilator          Mode: AC/ CMV (Assist Control/ Continuous Mandatory Ventilation), RR (machine): 16, TV (machine): 450, FiO2: 40, PEEP: 6, ITime: 1, MAP: 12, PIP: 18    02-28 @ 07:01 - 03-01 @ 07:00  --------------------------------------------------------  IN: 2338 mL / OUT: 1950 mL / NET: 388 mL    03-01 @ 07:01 - 03-01 @ 07:45  --------------------------------------------------------  IN: 27 mL / OUT: 0 mL / NET: 27 mL      CAPILLARY BLOOD GLUCOSE      POCT Blood Glucose.: 185 mg/dL (28 Feb 2025 11:25)        PHYSICAL EXAM:  GENERAL: Intubated, sedated but tracking  HEENT: NCAT  NECK: supple without JVD  CHEST/LUNG: mechanical breath sounds bilaterally  CARDIOVASCULAR: regular rate and rhythm, normal S1 and S2, no murmur/rub/gallop  ABDOMEN: positive bowel sounds, non-distended, no organomegaly   EXTREMITIES: no lower extremity edema, b/l upper extremities edematous, though improved in appearance  NEUROLOGY: sedated    MEDICATIONS:  MEDICATIONS  (STANDING):  albuterol/ipratropium for Nebulization 3 milliLiter(s) Nebulizer every 6 hours  atorvastatin 20 milliGRAM(s) Oral at bedtime  buMETAnide Infusion 1 mG/Hr (5 mL/Hr) IV Continuous <Continuous>  calcium acetate 667 milliGRAM(s) Oral every 8 hours  chlorhexidine 0.12% Liquid 15 milliLiter(s) Oral Mucosa every 12 hours  cholecalciferol 1000 Unit(s) Oral daily  dexMEDEtomidine Infusion 0.2 MICROgram(s)/kG/Hr (4.68 mL/Hr) IV Continuous <Continuous>  escitalopram 15 milliGRAM(s) Oral with breakfast  gabapentin Solution 150 milliGRAM(s) Oral every 12 hours  heparin  Infusion.  Unit(s)/Hr (17 mL/Hr) IV Continuous <Continuous>  influenza  Vaccine (HIGH DOSE) 0.5 milliLiter(s) IntraMuscular once  lacosamide IVPB 200 milliGRAM(s) IV Intermittent every 12 hours  lamoTRIgine 25 milliGRAM(s) Oral two times a day  levETIRAcetam   Injectable 750 milliGRAM(s) IV Push every 12 hours  lurasidone 40 milliGRAM(s) Oral at bedtime  naloxegol 12.5 milliGRAM(s) Oral daily  pantoprazole   Suspension 40 milliGRAM(s) Oral daily  polyethylene glycol 3350 17 Gram(s) Oral daily  senna 2 Tablet(s) Oral at bedtime  sodium chloride 3%  Inhalation 4 milliLiter(s) Inhalation every 6 hours    MEDICATIONS  (PRN):      ALLERGIES:  Allergies    seasonal allergies (Unknown)  penicillin (Hives)    Intolerances    latex (Rash)      LABS:                        7.4    7.36  )-----------( 101      ( 01 Mar 2025 00:50 )             23.3     03-01    139  |  94[L]  |  100[H]  ----------------------------<  158[H]  3.7   |  21[L]  |  4.24[H]    Ca    9.1      01 Mar 2025 00:50  Phos  8.9     03-01  Mg     2.5     03-01    TPro  7.4  /  Alb  2.8[L]  /  TBili  0.3  /  DBili  x   /  AST  37  /  ALT  47[H]  /  AlkPhos  148[H]  03-01    PT/INR - ( 01 Mar 2025 00:51 )   PT: 11.3 sec;   INR: 0.99 ratio         PTT - ( 01 Mar 2025 00:51 )  PTT:63.8 sec  Urinalysis Basic - ( 01 Mar 2025 00:50 )    Color: x / Appearance: x / SG: x / pH: x  Gluc: 158 mg/dL / Ketone: x  / Bili: x / Urobili: x   Blood: x / Protein: x / Nitrite: x   Leuk Esterase: x / RBC: x / WBC x   Sq Epi: x / Non Sq Epi: x / Bacteria: x        RADIOLOGY & ADDITIONAL TESTS: Reviewed.

## 2025-03-01 NOTE — OCCUPATIONAL THERAPY INITIAL EVALUATION ADULT - PERTINENT HX OF CURRENT PROBLEM, REHAB EVAL
69 year old male with a past medical history of hypertension, hyperlipemia VAZQUEZ (on CPAP at bedtime, NC 2 liters during the day), CKD stage 3, epilepsy, schizophrenia, developmental delay, metastatic testicular cancer (s/p orchiectomy, actively on chemotherapy, last dose of etoposide/cisplatin on 6/2024), presenting with acute on chronic hypoxemic respiratory failure, secondary to (a) COVID-19 infection, (b) superimposed pneumonia after recent influenza infection, and/or (c) fluid overload. MICU consulted and accepted after RRT due to increased work of breathing.  02/27/2025:  remains under the care of the ICU, intubated, no signs of bleeding and continues on heparin drip, counts stable, hasnot required PRBC support this [id number]/28/2025: continues under the care of MICU, continues intubated, no signs of bleeding, on heparin drip03/01/2025: continues in MICU, intubated, direct josefina IgG positive, eluate negative, not likely to be clinically significant.VA duppler:  No evidence of deep venous thrombosis in either lower extremity in the visualized venous segments.  CT T SPINE: 1. Multifocal lung abnormalities, with by basilar atelectasis or consolidation and patchy airspace opacities in the right upper lobe.2. No acute abnormality of the spine. Stable appearance of post surgical changes and hardware.  CT brain:No acute intracranial hemorrhage, brain edema, or mass effect.No displaced calvarial fracture.CT cervical spine:No acute fracture or traumatic subluxation.No prevertebral soft tissue swelling.Degenerative changes.CT maxillofacial bones:No acute fracture or traumatic subluxation.

## 2025-03-01 NOTE — OCCUPATIONAL THERAPY INITIAL EVALUATION ADULT - PLANNED THERAPY INTERVENTIONS, OT EVAL
balance training/bed mobility training/cognitive, visual perceptual/ROM/strengthening/transfer training

## 2025-03-01 NOTE — PROGRESS NOTE ADULT - NSPROGADDITIONALINFOA_GEN_ALL_CORE
speech & swallow eval appreciated, s/p FEES: recommends thicken pureed diet.  events reviewed, RRT for hypoxia, intubated/sedated for increased work of breathing.   transferred to ICU. ICU eval appreciated  GOC discussed, full code per the sister.   Transferred to ICU, remain intubated.   Bumex gtt for overload. weaned off pressors.   completed abx, completed antiviral.   ICU care appreciated.   on sedation vacation. vent management per ICU.    - Dr. SHIRA Leroy ([facility])  - [phone]

## 2025-03-01 NOTE — PROGRESS NOTE ADULT - PROBLEM SELECTOR PLAN 5
-By hx  -Continue Keppra.  MRI showed :  Mild periventricular and moderate deep and subcortical white   matter ischemia. 5.4 mm enhancing lesion in the LEFT middle cerebellar   peduncle with associated edema suspicious for metastases.    Marked LEFT   and mild RIGHT mucosal thickening within the BILATERAL mastoid air cells.  defer to primaryt  \eam

## 2025-03-01 NOTE — PROGRESS NOTE ADULT - SUBJECTIVE AND OBJECTIVE BOX
[facility]   [phone]  RENAL FOLLOW UP NOTE  --------------------------------------------------------------------------------  Chief Complaint:    24 hour events/subjective:    seen earlier   intubated     PAST HISTORY  --------------------------------------------------------------------------------  No significant changes to PMH, PSH, FHx, SHx, unless otherwise noted    ALLERGIES & MEDICATIONS  --------------------------------------------------------------------------------  Allergies    seasonal allergies (Unknown)  penicillin (Hives)    Intolerances    latex (Rash)    Standing Inpatient Medications  albuterol/ipratropium for Nebulization 3 milliLiter(s) Nebulizer every 6 hours  atorvastatin 20 milliGRAM(s) Oral at bedtime  buMETAnide Infusion 1 mG/Hr IV Continuous <Continuous>  chlorhexidine 0.12% Liquid 15 milliLiter(s) Oral Mucosa every 12 hours  cholecalciferol 1000 Unit(s) Oral daily  dexMEDEtomidine Infusion 0.2 MICROgram(s)/kG/Hr IV Continuous <Continuous>  escitalopram 15 milliGRAM(s) Oral with breakfast  gabapentin Solution 150 milliGRAM(s) Oral every 12 hours  influenza  Vaccine (HIGH DOSE) 0.5 milliLiter(s) IntraMuscular once  lacosamide IVPB 200 milliGRAM(s) IV Intermittent every 12 hours  lamoTRIgine 25 milliGRAM(s) Oral two times a day  levETIRAcetam   Injectable 750 milliGRAM(s) IV Push every 12 hours  lurasidone 40 milliGRAM(s) Oral at bedtime  naloxegol 12.5 milliGRAM(s) Oral daily  pantoprazole   Suspension 40 milliGRAM(s) Oral daily  polyethylene glycol 3350 17 Gram(s) Oral daily  propofol Infusion 10 MICROgram(s)/kG/Min IV Continuous <Continuous>  senna 2 Tablet(s) Oral at bedtime  sevelamer carbonate 1600 milliGRAM(s) Oral every 8 hours  sodium chloride 3%  Inhalation 4 milliLiter(s) Inhalation every 6 hours    PRN Inpatient Medications      REVIEW OF SYSTEMS  --------------------------------------------------------------------------------      VITALS/PHYSICAL EXAM  --------------------------------------------------------------------------------  T(C): 37.3 (03-01-25 @ 20:00), Max: 37.8 (03-01-25 @ 00:00)  HR: 94 (03-01-25 @ 19:00) (80 - 101)  BP: 162/76 (03-01-25 @ 19:00) (91/53 - 174/71)  RR: 26 (03-01-25 @ 19:00) (21 - 30)  SpO2: 100% (03-01-25 @ 19:00) (94% - 100%)  Wt(kg): --        02-28-25 @ 07:01  -  03-01-25 @ 07:00  --------------------------------------------------------  IN: 2331 mL / OUT: 1950 mL / NET: 381 mL    03-01-25 @ 07:01  -  03-01-25 @ 20:03  --------------------------------------------------------  IN: 1097 mL / OUT: 1095 mL / NET: 2 mL      Physical Exam:  	    Gen: ill appearing male, intubated   	Pulm: decrease bs, +coarse   	CV: No JVD. RRR, S1S2; no rub  	Abd: +BS, soft, nondistended  	: No suprapubic tenderness, external catheter  	UE: Warm, no cyanosis  no clubbing,  no edema;  	LE: Warm, no cyanosis  no clubbing, no edema    LABS/STUDIES  --------------------------------------------------------------------------------              7.4    7.36  >-----------<  101      [03-01-25 @ 00:50]              23.3     139  |  94  |  104  ----------------------------<  137      [03-01-25 @ 12:36]  3.6   |  22  |  4.28        Ca     9.4     [03-01-25 @ 12:36]      Mg     2.6     [03-01-25 @ 12:36]      Phos  9.3     [03-01-25 @ 12:36]    TPro  7.4  /  Alb  2.8  /  TBili  0.3  /  DBili  x   /  AST  37  /  ALT  47  /  AlkPhos  148  [03-01-25 @ 00:50]    PT/INR: PT 11.3 , INR 0.99       [03-01-25 @ 00:51]  PTT: 63.8       [03-01-25 @ 00:51]      Creatinine Trend:  SCr 4.28 [03-01 @ 12:36]  SCr 4.24 [03-01 @ 00:50]  SCr 3.89 [02-28 @ 12:33]  SCr 4.01 [02-28 @ 00:29]  SCr 4.03 [02-27 @ 09:21]            Ferritin [id number]      [02-23-25 @ 06:15]  TSH 0.87      [01-25-25 @ 11:31]

## 2025-03-01 NOTE — PROGRESS NOTE ADULT - PROBLEM SELECTOR PLAN 3
-CT chest with b/l GGO, new since 1/30/25. Likely COVID PNA +/- superimposed bacterial PNA  -Continue ABX.  2/27 : on antibiotics:  2/28: remains intubated and sedated  on vasopressors:  for septic shock  3/1: as above

## 2025-03-01 NOTE — PROGRESS NOTE ADULT - SUBJECTIVE AND OBJECTIVE BOX
[facility] HEMATOLOGY/ONCOLOGY INPATIENT PROGRESS NOTE     Interval Hx:   03-01-25: Mr. Gr was seen at bedside today.    Meds:   MEDICATIONS  (STANDING):  albuterol/ipratropium for Nebulization 3 milliLiter(s) Nebulizer every 6 hours  atorvastatin 20 milliGRAM(s) Oral at bedtime  buMETAnide Infusion 1 mG/Hr (5 mL/Hr) IV Continuous <Continuous>  calcium acetate 667 milliGRAM(s) Oral every 8 hours  chlorhexidine 0.12% Liquid 15 milliLiter(s) Oral Mucosa every 12 hours  cholecalciferol 1000 Unit(s) Oral daily  dexMEDEtomidine Infusion 0.2 MICROgram(s)/kG/Hr (4.68 mL/Hr) IV Continuous <Continuous>  escitalopram 15 milliGRAM(s) Oral with breakfast  gabapentin Solution 150 milliGRAM(s) Oral every 12 hours  heparin  Infusion.  Unit(s)/Hr (17 mL/Hr) IV Continuous <Continuous>  influenza  Vaccine (HIGH DOSE) 0.5 milliLiter(s) IntraMuscular once  lacosamide IVPB 200 milliGRAM(s) IV Intermittent every 12 hours  lamoTRIgine 25 milliGRAM(s) Oral two times a day  levETIRAcetam   Injectable 750 milliGRAM(s) IV Push every 12 hours  lurasidone 40 milliGRAM(s) Oral at bedtime  naloxegol 12.5 milliGRAM(s) Oral daily  pantoprazole   Suspension 40 milliGRAM(s) Oral daily  polyethylene glycol 3350 17 Gram(s) Oral daily  senna 2 Tablet(s) Oral at bedtime  sodium chloride 3%  Inhalation 4 milliLiter(s) Inhalation every 6 hours    MEDICATIONS  (PRN):    Vital Signs Last 24 Hrs  T(C): 37.8 (01 Mar 2025 00:00), Max: 37.8 (01 Mar 2025 00:00)  T(F): 100 (01 Mar 2025 00:00), Max: 100 (01 Mar 2025 00:00)  HR: 84 (01 Mar 2025 04:05) (80 - 100)  BP: 94/51 (01 Mar 2025 03:00) (91/53 - 120/69)  BP(mean): 68 (01 Mar 2025 03:00) (66 - 88)  RR: 28 (01 Mar 2025 03:00) (23 - 31)  SpO2: 97% (01 Mar 2025 04:05) (94% - 100%)    Parameters below as of 28 Feb 2025 23:45  Patient On (Oxygen Delivery Method): ventilator    Physical Exam:  Gen: Intubated  HEENT: EOMI, MMM  Chest: equal chest rise  Cardiac: regular   Abd: non distended   Neuro: sedated     Labs:                        7.4    7.36  )-----------( 101      ( 01 Mar 2025 00:50 )             23.3     CBC Full  -  ( 01 Mar 2025 00:50 )  WBC Count : 7.36 K/uL  RBC Count : 2.32 M/uL  Hemoglobin : 7.4 g/dL  Hematocrit : 23.3 %  Platelet Count - Automated : 101 K/uL  Mean Cell Volume : 100.4 fl  Mean Cell Hemoglobin : 31.9 pg  Mean Cell Hemoglobin Concentration : 31.8 g/dL    03-01    139  |  94[L]  |  100[H]  ----------------------------<  158[H]  3.7   |  21[L]  |  4.24[H]    Ca    9.1      01 Mar 2025 00:50  Phos  8.9     03-01  Mg     2.5     03-01    TPro  7.4  /  Alb  2.8[L]  /  TBili  0.3  /  DBili  x   /  AST  37  /  ALT  47[H]  /  AlkPhos  148[H]  03-01    PT/INR - ( 01 Mar 2025 00:51 )   PT: 11.3 sec;   INR: 0.99 ratio         PTT - ( 01 Mar 2025 00:51 )  PTT:63.8 sec  Bilirubin Total: 0.3 mg/dL (03-01-25 @ 00:50)   [facility] HEMATOLOGY/ONCOLOGY INPATIENT PROGRESS NOTE     Interval Hx:   03-01-25: Mr. Gr was seen at bedside today, continues in MICU, intubated, direct josefina IgG positive, eluate negative, not likely to be clinically significant     Meds:   MEDICATIONS  (STANDING):  albuterol/ipratropium for Nebulization 3 milliLiter(s) Nebulizer every 6 hours  atorvastatin 20 milliGRAM(s) Oral at bedtime  buMETAnide Infusion 1 mG/Hr (5 mL/Hr) IV Continuous <Continuous>  calcium acetate 667 milliGRAM(s) Oral every 8 hours  chlorhexidine 0.12% Liquid 15 milliLiter(s) Oral Mucosa every 12 hours  cholecalciferol 1000 Unit(s) Oral daily  dexMEDEtomidine Infusion 0.2 MICROgram(s)/kG/Hr (4.68 mL/Hr) IV Continuous <Continuous>  escitalopram 15 milliGRAM(s) Oral with breakfast  gabapentin Solution 150 milliGRAM(s) Oral every 12 hours  heparin  Infusion.  Unit(s)/Hr (17 mL/Hr) IV Continuous <Continuous>  influenza  Vaccine (HIGH DOSE) 0.5 milliLiter(s) IntraMuscular once  lacosamide IVPB 200 milliGRAM(s) IV Intermittent every 12 hours  lamoTRIgine 25 milliGRAM(s) Oral two times a day  levETIRAcetam   Injectable 750 milliGRAM(s) IV Push every 12 hours  lurasidone 40 milliGRAM(s) Oral at bedtime  naloxegol 12.5 milliGRAM(s) Oral daily  pantoprazole   Suspension 40 milliGRAM(s) Oral daily  polyethylene glycol 3350 17 Gram(s) Oral daily  senna 2 Tablet(s) Oral at bedtime  sodium chloride 3%  Inhalation 4 milliLiter(s) Inhalation every 6 hours    MEDICATIONS  (PRN):    Vital Signs Last 24 Hrs  T(C): 37.8 (01 Mar 2025 00:00), Max: 37.8 (01 Mar 2025 00:00)  T(F): 100 (01 Mar 2025 00:00), Max: 100 (01 Mar 2025 00:00)  HR: 84 (01 Mar 2025 04:05) (80 - 100)  BP: 94/51 (01 Mar 2025 03:00) (91/53 - 120/69)  BP(mean): 68 (01 Mar 2025 03:00) (66 - 88)  RR: 28 (01 Mar 2025 03:00) (23 - 31)  SpO2: 97% (01 Mar 2025 04:05) (94% - 100%)    Parameters below as of 28 Feb 2025 23:45  Patient On (Oxygen Delivery Method): ventilator    Physical Exam:  Gen: Intubated  HEENT: EOMI, MMM  Chest: equal chest rise  Cardiac: regular   Abd: non distended   Neuro: sedated     Labs:                        7.4    7.36  )-----------( 101      ( 01 Mar 2025 00:50 )             23.3     CBC Full  -  ( 01 Mar 2025 00:50 )  WBC Count : 7.36 K/uL  RBC Count : 2.32 M/uL  Hemoglobin : 7.4 g/dL  Hematocrit : 23.3 %  Platelet Count - Automated : 101 K/uL  Mean Cell Volume : 100.4 fl  Mean Cell Hemoglobin : 31.9 pg  Mean Cell Hemoglobin Concentration : 31.8 g/dL    03-01    139  |  94[L]  |  100[H]  ----------------------------<  158[H]  3.7   |  21[L]  |  4.24[H]    Ca    9.1      01 Mar 2025 00:50  Phos  8.9     03-01  Mg     2.5     03-01    TPro  7.4  /  Alb  2.8[L]  /  TBili  0.3  /  DBili  x   /  AST  37  /  ALT  47[H]  /  AlkPhos  148[H]  03-01    PT/INR - ( 01 Mar 2025 00:51 )   PT: 11.3 sec;   INR: 0.99 ratio         PTT - ( 01 Mar 2025 00:51 )  PTT:63.8 sec  Bilirubin Total: 0.3 mg/dL (03-01-25 @ 00:50)

## 2025-03-01 NOTE — PROGRESS NOTE ADULT - ASSESSMENT
Mr. Gr is a 69 year old male with PMHx of seizure disorder, sleep apnea, HTN, chronic idiopathic constipation, stage III CKD, severe obesity, secondary hyperparathyroidism, anemia, thrombocytopenia, hyperlipidemia, testicular cancer under treatment with Dr. Ovalles who is admitted for acute hypoxic respiratory failure secondary to COVID vs superimposed pneumonia with new onset thrombocytopenia. He was recently discharged 02/06/2025 after a tenous stay including intubation in the ICU for respiratory failure in setting of seizure. He had recovered and was discharged to rehab.      Former smoker, quit 14y prior, denied ETOH, drug use. Lives at home. Does not have children. Denies family history of blood disorders or malignancy.  Denies previous workplace related exposures.  Denies previous history of blood transfusions.  Denied history of thromboses.  Denied history of  requiring anticoagulation.     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  Margins were negative.  pT3Nx. CT C/A/P with few left para-aortic and left iliac chain lymph nodes largest measuring 8.1 cm left para-aortic node, bilateral subcentimeter noncalcified pulmonary nodules measuring up to 7 mm. AFP, LDH, hCG were all elevated. 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 Started on adjuvant therapy with Cisplatin+Etoposide, so far completed 4 cycles of treatment with cisplatin dose reduced 50% and Etoposide 50% due to thrombocytopenia.      02/19/2025: on HFNC, noted tachycardia and fever, Klebsiella in urine as well, thrombocytopenia stable/improving, no signs of active bleeding     02/20/2025: developed A.fib with RVR and was placed on heparin drip and pending cardiology eval    02/21/2025: awake, on AVAPS overnight, noted RRT for hypoxia as pt removed HFNC at some point in time, good response thereafter     02/22/2025:  continues on AVAPS this morning, noted RRT overnight for hypoxia, hypercapnia, ICU following, US LE negative for DVT, counts overall stable/improved     02/23/2025: progressive respiratory failure, noted ongoing RTT this morning with pending intubation and transfer to MICU     02/24/2025: intubated 02/23/2025, sedated, on pressor support, no signs of bleeding, counts noted, no signs of bleeding     02/25/2025: continues in MICU, intubated and sedated, no signs of bleeding    02/26/2025: continues in MICU, intubated, without signs of bleeding, continues on heparin drip     02/27/2025:  remains under the care of the ICU, intubated, no signs of bleeding and continues on heparin drip, counts stable, has not required PRBC support this admission    02/28/2025: continues under the care of MICU, continues intubated, no signs of bleeding, on heparin drip        #Acute hypoxic respiratory failure  # COVID  - CT noted with bilateral GGO  - ID following  - Pulmonary following  - Antibiotics per primary team/MICU and ID   - Intubated 02/23/2025 AM, MICU     # Thrombocytopenia   - Did occur during most recent admission and improved to normal prior to discharge   - Without signs of bleeding  - Could be multifactorial, possibly due to infection   - Continue to trend  - Transfuse to maintain Plt > 10k unless actively bleeding then maintain > 50k     # Brain lesion  - pt with hx of seizures  - MRI brain now with 5.4 mm enhancing lesion in the LEFT middle cerebellar peduncle with associated edema suspicious for metastases  - MRI Lumbar negative for acute disease  - Small lesion without mass effect or edema, recommended to correlate per neurology if foci and locus are concordant with seizure activity  - Neurology recs noted, new lesion not likely to cause seizure  - It is rare, but nonetheless not impossible, for testicular cancer to be metastatic to the brain  - He will need another PET-CT, can be completed outpatient once stable if concern can proceed with biopsy at that time   - Previous endocrinology eval for thyroid nodule noted  - AFP/[facility] normal,  previously      # 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 Ovalles, agree with current plan  - Follow up as outpatient with Franki Ovalles MD     # Acute kidney injury on CKD Stage IIIA  - Likely multifactorial  - Nephrology consult and recs noted  - Continue to trend     # Normocytic anemia  - Chronic, has hx of PRBC during chemo 07/2024  - Noted Anti E, Anti-K Anti-C antibodies previously  - Monitor for bleeding  - Recommend to transfuse to maintain Hb > 7    # Klebsiella UTI  -Antibiotics per ID and primary team       Recommendations  - Trend with CBC daily   - Transfuse to maintain Hb > 7   - Transfuse to maintain Plt >10k unless active bleeding then >50k   - Monitor renal function  - Antibiotics per primary team/MICU and ID   - Cardiology follow up to address anticoagulation, currently on heparin drip   - f/u ongoing ICU recs         Thank you for allowing me to participate in the care of Mr. Gr please do not hesitate to call or text me if you have further questions or concerns.     Brayan Mart MD  [facility] [place]   Division of Hematology/Oncology  [facility] [facility] [facility], [address]  [place] [address]  P: [phone]  F: [phone]    Attestation:    ----Pt evaluated including face-to-face interaction in addition to chart review, reviewing treatment plan, and managing the patient’s chronic diagnoses as listed in the assessment----        Mr. Gr is a 69 year old male with PMHx of seizure disorder, sleep apnea, HTN, chronic idiopathic constipation, stage III CKD, severe obesity, secondary hyperparathyroidism, anemia, thrombocytopenia, hyperlipidemia, testicular cancer under treatment with Dr. Ovalles who is admitted for acute hypoxic respiratory failure secondary to COVID vs superimposed pneumonia with new onset thrombocytopenia. He was recently discharged 02/06/2025 after a tenous stay including intubation in the ICU for respiratory failure in setting of seizure. He had recovered and was discharged to rehab.      Former smoker, quit 14y prior, denied ETOH, drug use. Lives at home. Does not have children. Denies family history of blood disorders or malignancy.  Denies previous workplace related exposures.  Denies previous history of blood transfusions.  Denied history of thromboses.  Denied history of  requiring anticoagulation.     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  Margins were negative.  pT3Nx. CT C/A/P with few left para-aortic and left iliac chain lymph nodes largest measuring 8.1 cm left para-aortic node, bilateral subcentimeter noncalcified pulmonary nodules measuring up to 7 mm. AFP, LDH, hCG were all elevated. 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 Started on adjuvant therapy with Cisplatin+Etoposide, so far completed 4 cycles of treatment with cisplatin dose reduced 50% and Etoposide 50% due to thrombocytopenia.      02/19/2025: on HFNC, noted tachycardia and fever, Klebsiella in urine as well, thrombocytopenia stable/improving, no signs of active bleeding     02/20/2025: developed A.fib with RVR and was placed on heparin drip and pending cardiology eval    02/21/2025: awake, on AVAPS overnight, noted RRT for hypoxia as pt removed HFNC at some point in time, good response thereafter     02/22/2025:  continues on AVAPS this morning, noted RRT overnight for hypoxia, hypercapnia, ICU following, US LE negative for DVT, counts overall stable/improved     02/23/2025: progressive respiratory failure, noted ongoing RTT this morning with pending intubation and transfer to MICU     02/24/2025: intubated 02/23/2025, sedated, on pressor support, no signs of bleeding, counts noted, no signs of bleeding     02/25/2025: continues in MICU, intubated and sedated, no signs of bleeding    02/26/2025: continues in MICU, intubated, without signs of bleeding, continues on heparin drip     02/27/2025:  remains under the care of the ICU, intubated, no signs of bleeding and continues on heparin drip, counts stable, has not required PRBC support this admission    02/28/2025: continues under the care of MICU, continues intubated, no signs of bleeding, on heparin drip    03/01/2025: continues in MICU, intubated, direct josefina IgG positive, eluate negative, not likely to be clinically significant       #Acute hypoxic respiratory failure  # COVID  - CT noted with bilateral GGO  - ID following  - Pulmonary following  - Antibiotics per primary team/MICU and ID   - Intubated 02/23/2025 AM, MICU     # Thrombocytopenia   - Did occur during most recent admission and improved to normal prior to discharge   - Without signs of bleeding  - Could be multifactorial, possibly due to infection   - Continue to trend  - Transfuse to maintain Plt > 10k unless actively bleeding then maintain > 50k     # Brain lesion  - pt with hx of seizures  - MRI brain now with 5.4 mm enhancing lesion in the LEFT middle cerebellar peduncle with associated edema suspicious for metastases  - MRI Lumbar negative for acute disease  - Small lesion without mass effect or edema, recommended to correlate per neurology if foci and locus are concordant with seizure activity  - Neurology recs noted, new lesion not likely to cause seizure  - It is rare, but nonetheless not impossible, for testicular cancer to be metastatic to the brain  - He will need another PET-CT, can be completed outpatient once stable if concern can proceed with biopsy at that time   - Previous endocrinology eval for thyroid nodule noted  - AFP/[facility] normal,  previously      # 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 Ovalles, agree with current plan  - Follow up as outpatient with Franki Ovalles MD     # Acute kidney injury on CKD Stage IIIA  - Likely multifactorial  - Nephrology consult and recs noted  - Continue to trend     # Normocytic anemia  - Chronic, has hx of PRBC during chemo 07/2024  - Noted Anti E, Anti-K Anti-C antibodies previously  - direct josefina IgG positive, eluate negative, not likely to be clinically significant   - Monitor for bleeding  - Recommend to transfuse to maintain Hb > 7    # Klebsiella UTI  -Antibiotics per ID and primary team       Recommendations  - Trend with CBC daily   - Transfuse to maintain Hb > 7   - Transfuse to maintain Plt >10k unless active bleeding then >50k   - Monitor renal function  - Antibiotics per primary team/MICU and ID   - Cardiology follow up to address anticoagulation, currently on heparin drip   - f/u ongoing ICU recs         Thank you for allowing me to participate in the care of Mr. Gr please do not hesitate to call or text me if you have further questions or concerns.     Brayan Mart MD  [facility] [place]   Division of Hematology/Oncology  [address], [address]  [place] [address]  P: [phone]  F: [phone]    Attestation:    ----Pt evaluated including face-to-face interaction in addition to chart review, reviewing treatment plan, and managing the patient’s chronic diagnoses as listed in the assessment----

## 2025-03-01 NOTE — PROGRESS NOTE ADULT - PROBLEM SELECTOR PLAN 1
-Recent admission for acute respiratory failure in the setting of grand mal seizures, influenza, PNA. S/p intubation in MICU, was extubated to AVAPS  -Now COVID +  -CXR with low lung volumes, mild congestion. Possible underlying infiltrate  -CT chest with b/l GGO, new since 1/30/25. Likely COVID PNA +/- superimposed bacterial PNA.   -Started on HFNC 2/18  -S/p multiple RRTs for hypoxia, increased WOB. Intubated s/p RRT on 2/23 & transferred to MICU  -Continue bronchodilators  -ABX per ID/MICU  -Pressure support trials as tolerated  -Keep sats >90%.  2/26: remains intubated:  on 40% fio2:  per MICU :  cont antibiotics:  cxr with Improvement in upper lobes  2/27: seems to be doing same:  remains intubated and sedated : on antibiotics and pressors:  cont current rx  3/1: no change in status:  remains the same:  intubated and sedated

## 2025-03-01 NOTE — PROGRESS NOTE ADULT - ASSESSMENT
69 year old male with a past medical history of hypertension, hyperlipemia VAZQUEZ (on CPAP at bedtime, NC 2 liters during the day), CKD stage 3, epilepsy, schizophrenia, developmental delay, metastatic testicular cancer (s/p orchiectomy, actively on chemotherapy, last dose of etoposide/cisplatin on 6/2024), presenting with acute on chronic hypoxemic respiratory failure, secondary to (a) COVID-19 infection, (b) superimposed pneumonia after recent influenza infection, and/or (c) fluid overload.     MICU consulted and accepted after RRT due to increased work of breathing    NEURO:  #Mental status:  - Sedated, however, was AAOx2 prior to increased work of breathing that required intubation  - continue with propofol, wean as tolerated    #Epilepsy:  - Continue with home meds which include Lacosamide, Lurasidone, Lamotrigine - lamotrigine restarted 2/24 after being held on 2/18, per pharmacy restarted at 25 BID, uptitrate over weeks, keppra decreased to home dose    #Brain Mets:  - MRI brain now with 5.4 mm enhancing lesion in the LEFT middle cerebellar peduncle with associated edema suspicious for metastases    CV:  #Echo:  Recent echo last admission showing Left ventricular cavity is normal in size. Left ventricular systolic function is normal with an ejection fraction of 62 %. There are no regional wall motion abnormalities seen. Normal right ventricular cavity size and normal right ventricular systolic function.   - Repeat echo continuing to show EF 70%    #Afib w/RVR  - New onset Afib RVR (on tele, confirmed with EKG 2/19)   - Plan: Started metoprolol 5mg IVP q6 hours & Hep gtt -> metoprolol held iso hypotension  - If rate remains uncontrolled, can start Cardizem drip at 5mg/h  - c/w Heparin drip    #HTN:  - On Hydralazine 25mg TID, Amlodipine 5mg, held iso current hypotension    PULM:  #Vent   - Due to increased work of breathing, now intubated  - SBT with 12/10 today    #AHRF  - Patient admitted to the hospital for AHRF, found to be COVID (+)  - Likely 2/2 PNA, does not appear to CHF decompensation  - Diuretics currently being held  - Continue with airway clearance regimen - duoneb, hypertonic saline, chest PT  - CT chest with b/l GGOs, possible early COVID fibrosis, area of consolidation ?infectious vs. atelectasis    RENAL:  #SHAGUFTA:   - Hx of CKD stage 3, Cr 2.38 at baseline, seems to be plateauing at 4.24  - In setting of COVID (+)  - Pre-renal, FeUrea 29%  - Monitor Cr  - Avoid nephrotoxic meds  - Wakefield in place  - Bumex 4 gtt  - Monitor BMP q12      #Electrolyte abnormalities  Hypernatremia, resolved  - Continue to monitor  - Nephro recs appreciated    GI:  No acute issues     #Diet: Tube feeds via OG tube  #Bowel Regimen  - On Senna and Miralax    ENDO:  No acute issues  Thyroid nodule noted in prior notes    HEMATOLOGIC:  #Thrombocytopenic  - Unclear origin  - Concern for possible mets to bone marrow? vs due to infection  - Continue to monitor  - Transfuse to maintain Plt>10K unless actively bleeding then maintain >50K  - Heme-Onc recs appreciated    #DVT prophylaxis   - SCD    ID:  #COVID-19  - Concern for possible superimposed PNA   - S/p zosyn 2/16- 2/28  - S/p Remdesivir  - Blood cx from 2 days ago negative    SKIN:  #Lines:  2x PIV    Ethics:  - Full Code  - Contact: Sister Ester [phone]  69 year old male with a past medical history of hypertension, hyperlipemia VAZQUEZ (on CPAP at bedtime, NC 2 liters during the day), CKD stage 3, epilepsy, schizophrenia, developmental delay, metastatic testicular cancer (s/p orchiectomy, actively on chemotherapy, last dose of etoposide/cisplatin on 6/2024), presenting with acute on chronic hypoxemic respiratory failure, secondary to (a) COVID-19 infection, (b) superimposed pneumonia after recent influenza infection, and/or (c) fluid overload.     MICU consulted and accepted after RRT due to increased work of breathing    NEURO:  #Mental status:  - Sedated, however, was AAOx2 prior to increased work of breathing that required intubation  - continue with precedex, wean as tolerated    #Epilepsy:  - Continue with home meds which include Lacosamide, Lurasidone, Lamotrigine - lamotrigine restarted 2/24 after being held on 2/18, per pharmacy restarted at 25 BID, uptitrate over weeks, keppra decreased to home dose    #Brain Mets:  - MRI brain now with 5.4 mm enhancing lesion in the LEFT middle cerebellar peduncle with associated edema suspicious for metastases    CV:  #Echo:  Recent echo last admission showing Left ventricular cavity is normal in size. Left ventricular systolic function is normal with an ejection fraction of 62 %. There are no regional wall motion abnormalities seen. Normal right ventricular cavity size and normal right ventricular systolic function.   - Repeat echo continuing to show EF 70%    #Afib w/RVR  - New onset Afib RVR (on tele, confirmed with EKG 2/19)   - Plan: Started metoprolol 5mg IVP q6 hours & Hep gtt -> metoprolol held iso hypotension  - If rate remains uncontrolled, can start Cardizem drip at 5mg/h  - c/w Heparin drip    #HTN:  - On Hydralazine 25mg TID, Amlodipine 5mg, held iso current hypotension    PULM:  #Vent   - Due to increased work of breathing, now intubated  - SBT as tolerated    #AHRF  - Patient admitted to the hospital for AHRF, found to be COVID (+)  - Likely 2/2 PNA, does not appear to CHF decompensation  - Continue with airway clearance regimen - duoneb, hypertonic saline, chest PT  - CT chest with b/l GGOs, possible early COVID fibrosis, area of consolidation ?infectious vs. atelectasis    RENAL:  #SHAGUFTA:   - Hx of CKD stage 3, Cr 2.38 at baseline, seems to be plateauing at 4.24  - In setting of COVID (+)  - Pre-renal, FeUrea 29%  - Monitor Cr  - Avoid nephrotoxic meds  - Wakefield in place  - Bumex 1 gtt  - Monitor BMP q12      #Electrolyte abnormalities  Hypernatremia, resolved  - Continue to monitor  - Nephro recs appreciated    GI:  No acute issues     #Diet: Tube feeds via OG tube  #Bowel Regimen  - On Senna and Miralax    ENDO:  No acute issues  Thyroid nodule noted in prior notes    HEMATOLOGIC:  #Thrombocytopenic  - Unclear origin  - Concern for possible mets to bone marrow? vs due to infection  - Continue to monitor  - Transfuse to maintain Plt>10K unless actively bleeding then maintain >50K  - Heme-Onc recs appreciated    #DVT prophylaxis   - SCD    ID:  #COVID-19  - Concern for possible superimposed PNA   - S/p zosyn 2/16- 2/28  - S/p Remdesivir  - Blood cx negative    SKIN:  #Lines:  2x PIV    Ethics:  - Full Code  - Contact: Sister Ester [phone]  69 year old male with a past medical history of hypertension, hyperlipemia VAZQUEZ (on CPAP at bedtime, NC 2 liters during the day), CKD stage 3, epilepsy, schizophrenia, developmental delay, metastatic testicular cancer (s/p orchiectomy, actively on chemotherapy, last dose of etoposide/cisplatin on 6/2024), presenting with acute on chronic hypoxemic respiratory failure, secondary to (a) COVID-19 infection, (b) superimposed pneumonia after recent influenza infection, and/or (c) fluid overload.     MICU consulted and accepted after RRT due to increased work of breathing    NEURO:  #Mental status:  - Sedated, however, was AAOx2 prior to increased work of breathing that required intubation  - continue with precedex, wean as tolerated    #Epilepsy:  - Continue with home meds which include Lacosamide, Lurasidone, Lamotrigine - lamotrigine restarted 2/24 after being held on 2/18, per pharmacy restarted at 25 BID, uptitrate over weeks, keppra decreased to home dose    #Brain Mets:  - MRI brain now with 5.4 mm enhancing lesion in the LEFT middle cerebellar peduncle with associated edema suspicious for metastases    # RUE weakness  - Hold heparin gtt and obtain CT head    CV:  #Echo:  Recent echo last admission showing Left ventricular cavity is normal in size. Left ventricular systolic function is normal with an ejection fraction of 62 %. There are no regional wall motion abnormalities seen. Normal right ventricular cavity size and normal right ventricular systolic function.   - Repeat echo continuing to show EF 70%    #Afib w/RVR  - New onset Afib RVR (on tele, confirmed with EKG 2/19)   - Plan: Started metoprolol 5mg IVP q6 hours & Hep gtt -> metoprolol held iso hypotension  - If rate remains uncontrolled, can start Cardizem drip at 5mg/h  - c/w Heparin drip    #HTN:  - On Hydralazine 25mg TID, Amlodipine 5mg, held iso current hypotension    PULM:  #Vent   - Due to increased work of breathing, now intubated  - SBT as tolerated    #AHRF  - Patient admitted to the hospital for AHRF, found to be COVID (+)  - Likely 2/2 PNA, does not appear to CHF decompensation  - Continue with airway clearance regimen - duoneb, hypertonic saline, chest PT  - CT chest with b/l GGOs, possible early COVID fibrosis, area of consolidation ?infectious vs. atelectasis    RENAL:  #SHAGUFTA:   - Hx of CKD stage 3, Cr 2.38 at baseline, seems to be plateauing at 4.24  - In setting of COVID (+)  - Pre-renal, FeUrea 29%  - Monitor Cr  - Avoid nephrotoxic meds  - Wakefield in place  - Bumex 1 gtt  - Monitor BMP q12      #Electrolyte abnormalities  Hypernatremia, resolved  - Continue to monitor  - Nephro recs appreciated    GI:  No acute issues     #Diet: Tube feeds via OG tube  #Bowel Regimen  - On Senna and Miralax    ENDO:  No acute issues  Thyroid nodule noted in prior notes    HEMATOLOGIC:  #Thrombocytopenic  - Unclear origin  - Concern for possible mets to bone marrow? vs due to infection  - Continue to monitor  - Transfuse to maintain Plt>10K unless actively bleeding then maintain >50K  - Heme-Onc recs appreciated    #DVT prophylaxis   - SCD    ID:  #COVID-19  - Concern for possible superimposed PNA   - S/p zosyn 2/16- 2/28  - S/p Remdesivir  - Blood cx negative    SKIN:  #Lines:  2x PIV    Ethics:  - Full Code  - Contact: Sister Ester [phone]

## 2025-03-01 NOTE — PROGRESS NOTE ADULT - SUBJECTIVE AND OBJECTIVE BOX
Date of Service: 03-01-25 @ 15:53    Patient is a 69y old  Male who presents with a chief complaint of Acute on chronic hypoxemic respiratory failure (01 Mar 2025 10:26)      Any change in ROS: remains intubated and sedated     MEDICATIONS  (STANDING):  albuterol/ipratropium for Nebulization 3 milliLiter(s) Nebulizer every 6 hours  atorvastatin 20 milliGRAM(s) Oral at bedtime  buMETAnide Infusion 1 mG/Hr (5 mL/Hr) IV Continuous <Continuous>  chlorhexidine 0.12% Liquid 15 milliLiter(s) Oral Mucosa every 12 hours  cholecalciferol 1000 Unit(s) Oral daily  dexMEDEtomidine Infusion 0.2 MICROgram(s)/kG/Hr (4.68 mL/Hr) IV Continuous <Continuous>  escitalopram 15 milliGRAM(s) Oral with breakfast  gabapentin Solution 150 milliGRAM(s) Oral every 12 hours  influenza  Vaccine (HIGH DOSE) 0.5 milliLiter(s) IntraMuscular once  lacosamide IVPB 200 milliGRAM(s) IV Intermittent every 12 hours  lamoTRIgine 25 milliGRAM(s) Oral two times a day  levETIRAcetam   Injectable 750 milliGRAM(s) IV Push every 12 hours  lurasidone 40 milliGRAM(s) Oral at bedtime  naloxegol 12.5 milliGRAM(s) Oral daily  pantoprazole   Suspension 40 milliGRAM(s) Oral daily  polyethylene glycol 3350 17 Gram(s) Oral daily  senna 2 Tablet(s) Oral at bedtime  sevelamer carbonate 1600 milliGRAM(s) Oral every 8 hours  sodium chloride 3%  Inhalation 4 milliLiter(s) Inhalation every 6 hours    MEDICATIONS  (PRN):    Vital Signs Last 24 Hrs  T(C): 37.4 (01 Mar 2025 15:00), Max: 37.8 (01 Mar 2025 00:00)  T(F): 99.3 (01 Mar 2025 15:00), Max: 100 (01 Mar 2025 00:00)  HR: 91 (01 Mar 2025 15:35) (80 - 100)  BP: 148/69 (01 Mar 2025 15:00) (91/53 - 156/73)  BP(mean): 99 (01 Mar 2025 15:00) (66 - 105)  RR: 21 (01 Mar 2025 15:00) (21 - 31)  SpO2: 96% (01 Mar 2025 15:35) (96% - 100%)    Parameters below as of 01 Mar 2025 11:59  Patient On (Oxygen Delivery Method): ventilator      Mode: CPAP with PS  FiO2: 40  PEEP: 6  PS: 10  MAP: 11  PIP: 17    I&O's Summary    28 Feb 2025 07:01  -  01 Mar 2025 07:00  --------------------------------------------------------  IN: 2331 mL / OUT: 1950 mL / NET: 381 mL    01 Mar 2025 07:01  -  01 Mar 2025 15:53  --------------------------------------------------------  IN: 660 mL / OUT: 645 mL / NET: 15 mL          Physical Exam:   GENERAL: NAD, well-groomed, well-developed  HEENT: BREE/   Atraumatic, Normocephalic  ENMT: No tonsillar erythema, exudates, or enlargement; Moist mucous membranes, Good dentition, No lesions  NECK: Supple, No JVD, Normal thyroid  CHEST/LUNG: Clear to auscultaion  CVS: Regular rate and rhythm; No murmurs, rubs, or gallops  GI: : Soft, Nontender, Nondistended; Bowel sounds present  NERVOUS SYSTEM:  sedated and intubated   EXTREMITIES: + edema  LYMPH: No lymphadenopathy noted  SKIN: No rashes or lesions  ENDOCRINOLOGY: No Thyromegaly  PSYCH: intubated and sedated     Labs:  ABG - ( 01 Mar 2025 00:47 )  pH, Arterial: 7.36  pH, Blood: x     /  pCO2: 41    /  pO2: 115   / HCO3: 23    / Base Excess: -2.1  /  SaO2: 99.7            40, 100, 24                            7.4    7.36  )-----------( 101      ( 01 Mar 2025 00:50 )             23.3                         7.5    8.31  )-----------( 104      ( 28 Feb 2025 00:29 )             23.9                         7.5    6.50  )-----------( 104      ( 27 Feb 2025 00:24 )             23.4                         7.3    7.55  )-----------( 103      ( 26 Feb 2025 00:17 )             23.2     03-01    139  |  94[L]  |  104[H]  ----------------------------<  137[H]  3.6   |  22  |  4.28[H]  03-01    139  |  94[L]  |  100[H]  ----------------------------<  158[H]  3.7   |  21[L]  |  4.24[H]  02-28    137  |  92[L]  |  104[H]  ----------------------------<  176[H]  3.6   |  21[L]  |  3.89[H]  02-28    138  |  95[L]  |  98[H]  ----------------------------<  127[H]  3.5   |  21[L]  |  4.01[H]  02-27    140  |  95[L]  |  92[H]  ----------------------------<  179[H]  4.3   |  24  |  4.03[H]  02-27    142  |  98  |  87[H]  ----------------------------<  113[H]  3.9   |  22  |  4.08[H]  02-26    141  |  98  |  81[H]  ----------------------------<  187[H]  4.2   |  22  |  3.95[H]  02-26    142  |  99  |  77[H]  ----------------------------<  161[H]  3.8   |  25  |  4.03[H]  02-26    140  |  102  |  75[H]  ----------------------------<  142[H]  3.8   |  21[L]  |  4.01[H]    Ca    9.4      01 Mar 2025 12:36  Ca    9.1      01 Mar 2025 00:50  Ca    9.0      28 Feb 2025 12:33  Ca    9.1      28 Feb 2025 00:29  Phos  9.3     03-01  Phos  8.9     03-01  Phos  7.9     02-28  Phos  7.0     02-28  Mg     2.6     03-01  Mg     2.5     03-01  Mg     2.2     02-28  Mg     2.2     02-28    TPro  7.4  /  Alb  2.8[L]  /  TBili  0.3  /  DBili  x   /  AST  37  /  ALT  47[H]  /  AlkPhos  148[H]  03-01  TPro  7.3  /  Alb  2.9[L]  /  TBili  0.3  /  DBili  x   /  AST  50[H]  /  ALT  54[H]  /  AlkPhos  166[H]  02-28  TPro  6.8  /  Alb  2.6[L]  /  TBili  0.3  /  DBili  x   /  AST  50[H]  /  ALT  46[H]  /  AlkPhos  153[H]  02-27    CAPILLARY BLOOD GLUCOSE      POCT Blood Glucose.: 148 mg/dL (01 Mar 2025 12:25)      LIVER FUNCTIONS - ( 01 Mar 2025 00:50 )  Alb: 2.8 g/dL / Pro: 7.4 g/dL / ALK PHOS: 148 U/L / ALT: 47 U/L / AST: 37 U/L / GGT: x           PT/INR - ( 01 Mar 2025 00:51 )   PT: 11.3 sec;   INR: 0.99 ratio         PTT - ( 01 Mar 2025 00:51 )  PTT:63.8 sec  Urinalysis Basic - ( 01 Mar 2025 12:36 )    Color: x / Appearance: x / SG: x / pH: x  Gluc: 137 mg/dL / Ketone: x  / Bili: x / Urobili: x   Blood: x / Protein: x / Nitrite: x   Leuk Esterase: x / RBC: x / WBC x   Sq Epi: x / Non Sq Epi: x / Bacteria: x      D-Dimer Assay, Quantitative: 2027 ng/mL DDU (02-23 @ 06:14)        RECENT CULTURES:    rad< from: CT Chest No Cont (02.26.25 @ 22:32) >  thoracolumbar surgical hardware. Chronic healed bilateral rib and right   clavicular fractures.      IMPRESSION:  Since 2/17/2025, while the upper lobe groundglass opacities have   improved, there are new and worsening confluent areas of   consolidation/pneumonia in the mid to lower lungs.      < end of copied text >      RESPIRATORY CULTURES:          Studies  Chest X-RAY  CT SCAN Chest   Venous Dopplers: LE:   CT Abdomen  Others    ct< from: CT Head No Cont (01.17.25 @ 20:08) >  IMPRESSION:  No CT evidence of acute intracranial pathology.    Scattered fecal thickening.  Air-fluid levels in the maxillary sinuses   may represent trapped secretions versus sinusitis.    Nonspecific trace left mastoid effusion.    < end of copied text >      < from: MR Head w/wo IV Cont (01.25.25 @ 04:18) >    MR brain: Mild periventricular and moderate deep and subcortical white   matter ischemia. 5.4 mm enhancing lesion in the LEFT middle cerebellar   peduncle with associated edema suspicious for metastases.    Marked LEFT   and mild RIGHT mucosal thickening within the BILATERAL mastoid air cells.    MRA NECK: No lymphadenopathy. 1.6 cm RIGHT thyroid nodule.4.2 cm RIGHT   upper lobe mass/infiltrate. Mild BILATERAL maxillary sinus mucosal   thickening and moderate RIGHT maxillary sinus secretions. Secretions are   seen within the nasopharynx.    --- End of Report ---    < end of copied text >

## 2025-03-02 LAB
ALBUMIN SERPL ELPH-MCNC: 2.9 G/DL — LOW (ref 3.3–5)
ALP SERPL-CCNC: 152 U/L — HIGH (ref 40–120)
ALT FLD-CCNC: 47 U/L — HIGH (ref 10–45)
ANION GAP SERPL CALC-SCNC: 22 MMOL/L — HIGH (ref 5–17)
ANION GAP SERPL CALC-SCNC: 22 MMOL/L — HIGH (ref 5–17)
APTT BLD: 59.7 SEC — HIGH (ref 24.5–35.6)
APTT BLD: 77.4 SEC — HIGH (ref 24.5–35.6)
AST SERPL-CCNC: 45 U/L — HIGH (ref 10–40)
BASOPHILS # BLD AUTO: 0 K/UL — SIGNIFICANT CHANGE UP (ref 0–0.2)
BASOPHILS NFR BLD AUTO: 0 % — SIGNIFICANT CHANGE UP (ref 0–2)
BILIRUB SERPL-MCNC: 0.3 MG/DL — SIGNIFICANT CHANGE UP (ref 0.2–1.2)
BUN SERPL-MCNC: 115 MG/DL — HIGH (ref 7–23)
BUN SERPL-MCNC: 117 MG/DL — HIGH (ref 7–23)
CALCIUM SERPL-MCNC: 9.3 MG/DL — SIGNIFICANT CHANGE UP (ref 8.4–10.5)
CALCIUM SERPL-MCNC: 9.4 MG/DL — SIGNIFICANT CHANGE UP (ref 8.4–10.5)
CHLORIDE SERPL-SCNC: 95 MMOL/L — LOW (ref 96–108)
CHLORIDE SERPL-SCNC: 95 MMOL/L — LOW (ref 96–108)
CO2 SERPL-SCNC: 22 MMOL/L — SIGNIFICANT CHANGE UP (ref 22–31)
CO2 SERPL-SCNC: 22 MMOL/L — SIGNIFICANT CHANGE UP (ref 22–31)
CREAT SERPL-MCNC: 4.06 MG/DL — HIGH (ref 0.5–1.3)
CREAT SERPL-MCNC: 4.11 MG/DL — HIGH (ref 0.5–1.3)
EGFR: 15 ML/MIN/1.73M2 — LOW
EOSINOPHIL # BLD AUTO: 0.39 K/UL — SIGNIFICANT CHANGE UP (ref 0–0.5)
EOSINOPHIL NFR BLD AUTO: 4.3 % — SIGNIFICANT CHANGE UP (ref 0–6)
GAS PNL BLDA: SIGNIFICANT CHANGE UP
GAS PNL BLDV: SIGNIFICANT CHANGE UP
GLUCOSE BLDC GLUCOMTR-MCNC: 137 MG/DL — HIGH (ref 70–99)
GLUCOSE BLDC GLUCOMTR-MCNC: 148 MG/DL — HIGH (ref 70–99)
GLUCOSE BLDC GLUCOMTR-MCNC: 160 MG/DL — HIGH (ref 70–99)
GLUCOSE SERPL-MCNC: 145 MG/DL — HIGH (ref 70–99)
GLUCOSE SERPL-MCNC: 165 MG/DL — HIGH (ref 70–99)
HCT VFR BLD CALC: 22.4 % — LOW (ref 39–50)
HCT VFR BLD CALC: 24.3 % — LOW (ref 39–50)
HGB BLD-MCNC: 7 G/DL — CRITICAL LOW (ref 13–17)
HGB BLD-MCNC: 7.7 G/DL — LOW (ref 13–17)
INR BLD: 0.98 RATIO — SIGNIFICANT CHANGE UP (ref 0.85–1.16)
INR BLD: 1.02 RATIO — SIGNIFICANT CHANGE UP (ref 0.85–1.16)
LYMPHOCYTES # BLD AUTO: 1.18 K/UL — SIGNIFICANT CHANGE UP (ref 1–3.3)
LYMPHOCYTES # BLD AUTO: 13 % — SIGNIFICANT CHANGE UP (ref 13–44)
MACROCYTES BLD QL: SLIGHT — SIGNIFICANT CHANGE UP
MAGNESIUM SERPL-MCNC: 2.6 MG/DL — SIGNIFICANT CHANGE UP (ref 1.6–2.6)
MAGNESIUM SERPL-MCNC: 2.7 MG/DL — HIGH (ref 1.6–2.6)
MANUAL SMEAR VERIFICATION: SIGNIFICANT CHANGE UP
MCHC RBC-ENTMCNC: 31.3 G/DL — LOW (ref 32–36)
MCHC RBC-ENTMCNC: 31.7 G/DL — LOW (ref 32–36)
MCHC RBC-ENTMCNC: 31.7 PG — SIGNIFICANT CHANGE UP (ref 27–34)
MCHC RBC-ENTMCNC: 32.2 PG — SIGNIFICANT CHANGE UP (ref 27–34)
MCV RBC AUTO: 101.4 FL — HIGH (ref 80–100)
MCV RBC AUTO: 101.7 FL — HIGH (ref 80–100)
MONOCYTES # BLD AUTO: 0.79 K/UL — SIGNIFICANT CHANGE UP (ref 0–0.9)
MONOCYTES NFR BLD AUTO: 8.7 % — SIGNIFICANT CHANGE UP (ref 2–14)
MYELOCYTES NFR BLD: 0.9 % — HIGH (ref 0–0)
NEUTROPHILS # BLD AUTO: 6.57 K/UL — SIGNIFICANT CHANGE UP (ref 1.8–7.4)
NEUTROPHILS NFR BLD AUTO: 71.3 % — SIGNIFICANT CHANGE UP (ref 43–77)
NEUTS BAND # BLD: 0.9 % — SIGNIFICANT CHANGE UP (ref 0–8)
NEUTS BAND NFR BLD: 0.9 % — SIGNIFICANT CHANGE UP (ref 0–8)
NRBC BLD AUTO-RTO: 0 /100 WBCS — SIGNIFICANT CHANGE UP (ref 0–0)
PHOSPHATE SERPL-MCNC: 8.9 MG/DL — HIGH (ref 2.5–4.5)
PHOSPHATE SERPL-MCNC: 9.1 MG/DL — HIGH (ref 2.5–4.5)
PLAT MORPH BLD: NORMAL — SIGNIFICANT CHANGE UP
PLATELET # BLD AUTO: 112 K/UL — LOW (ref 150–400)
PLATELET # BLD AUTO: 116 K/UL — LOW (ref 150–400)
POLYCHROMASIA BLD QL SMEAR: SLIGHT — SIGNIFICANT CHANGE UP
POTASSIUM SERPL-MCNC: 3.4 MMOL/L — LOW (ref 3.5–5.3)
POTASSIUM SERPL-MCNC: 3.7 MMOL/L — SIGNIFICANT CHANGE UP (ref 3.5–5.3)
POTASSIUM SERPL-SCNC: 3.4 MMOL/L — LOW (ref 3.5–5.3)
POTASSIUM SERPL-SCNC: 3.7 MMOL/L — SIGNIFICANT CHANGE UP (ref 3.5–5.3)
PROT SERPL-MCNC: 7.5 G/DL — SIGNIFICANT CHANGE UP (ref 6–8.3)
PROTHROM AB SERPL-ACNC: 11.3 SEC — SIGNIFICANT CHANGE UP (ref 9.9–13.4)
PROTHROM AB SERPL-ACNC: 11.6 SEC — SIGNIFICANT CHANGE UP (ref 9.9–13.4)
RBC # BLD: 2.21 M/UL — LOW (ref 4.2–5.8)
RBC # BLD: 2.39 M/UL — LOW (ref 4.2–5.8)
RBC # FLD: 15.9 % — HIGH (ref 10.3–14.5)
RBC # FLD: 16.1 % — HIGH (ref 10.3–14.5)
RBC BLD AUTO: SIGNIFICANT CHANGE UP
SODIUM SERPL-SCNC: 139 MMOL/L — SIGNIFICANT CHANGE UP (ref 135–145)
SODIUM SERPL-SCNC: 139 MMOL/L — SIGNIFICANT CHANGE UP (ref 135–145)
VARIANT LYMPHS # BLD: 0.9 % — SIGNIFICANT CHANGE UP (ref 0–6)
VARIANT LYMPHS NFR BLD MANUAL: 0.9 % — SIGNIFICANT CHANGE UP (ref 0–6)
WBC # BLD: 9.1 K/UL — SIGNIFICANT CHANGE UP (ref 3.8–10.5)
WBC # BLD: 9.1 K/UL — SIGNIFICANT CHANGE UP (ref 3.8–10.5)
WBC # FLD AUTO: 9.1 K/UL — SIGNIFICANT CHANGE UP (ref 3.8–10.5)
WBC # FLD AUTO: 9.1 K/UL — SIGNIFICANT CHANGE UP (ref 3.8–10.5)

## 2025-03-02 PROCEDURE — 99291 CRITICAL CARE FIRST HOUR: CPT

## 2025-03-02 PROCEDURE — 95720 EEG PHY/QHP EA INCR W/VEEG: CPT

## 2025-03-02 RX ORDER — LAMOTRIGINE 150 MG/1
50 TABLET ORAL
Refills: 0 | Status: DISCONTINUED | OUTPATIENT
Start: 2025-03-02 | End: 2025-03-02

## 2025-03-02 RX ORDER — DEXMEDETOMIDINE HYDROCHLORIDE IN SODIUM CHLORIDE 4 UG/ML
0.2 INJECTION INTRAVENOUS
Qty: 200 | Refills: 0 | Status: DISCONTINUED | OUTPATIENT
Start: 2025-03-02 | End: 2025-03-04

## 2025-03-02 RX ORDER — LAMOTRIGINE 150 MG/1
50 TABLET ORAL
Refills: 0 | Status: DISCONTINUED | OUTPATIENT
Start: 2025-03-02 | End: 2025-03-04

## 2025-03-02 RX ADMIN — Medication 40 MILLIGRAM(S): at 11:50

## 2025-03-02 RX ADMIN — Medication 4 MILLILITER(S): at 05:25

## 2025-03-02 RX ADMIN — IPRATROPIUM BROMIDE AND ALBUTEROL SULFATE 3 MILLILITER(S): .5; 2.5 SOLUTION RESPIRATORY (INHALATION) at 11:25

## 2025-03-02 RX ADMIN — SEVELAMER HYDROCHLORIDE 1600 MILLIGRAM(S): 800 TABLET ORAL at 05:13

## 2025-03-02 RX ADMIN — Medication 100 MILLIEQUIVALENT(S): at 05:48

## 2025-03-02 RX ADMIN — Medication 15 MILLILITER(S): at 05:12

## 2025-03-02 RX ADMIN — LAMOTRIGINE 50 MILLIGRAM(S): 150 TABLET ORAL at 17:08

## 2025-03-02 RX ADMIN — DEXMEDETOMIDINE HYDROCHLORIDE IN SODIUM CHLORIDE 4.46 MICROGRAM(S)/KG/HR: 4 INJECTION INTRAVENOUS at 17:07

## 2025-03-02 RX ADMIN — Medication 4 MILLILITER(S): at 17:25

## 2025-03-02 RX ADMIN — ESCITALOPRAM OXALATE 15 MILLIGRAM(S): 20 TABLET ORAL at 08:08

## 2025-03-02 RX ADMIN — Medication 0.1 MILLIGRAM(S): at 05:13

## 2025-03-02 RX ADMIN — HEPARIN SODIUM 1700 UNIT(S)/HR: 1000 INJECTION INTRAVENOUS; SUBCUTANEOUS at 13:15

## 2025-03-02 RX ADMIN — LURASIDONE HYDROCHLORIDE 40 MILLIGRAM(S): 120 TABLET, FILM COATED ORAL at 22:42

## 2025-03-02 RX ADMIN — PROPOFOL 5.62 MICROGRAM(S)/KG/MIN: 10 INJECTION, EMULSION INTRAVENOUS at 05:47

## 2025-03-02 RX ADMIN — Medication 4 MILLILITER(S): at 23:16

## 2025-03-02 RX ADMIN — IPRATROPIUM BROMIDE AND ALBUTEROL SULFATE 3 MILLILITER(S): .5; 2.5 SOLUTION RESPIRATORY (INHALATION) at 17:25

## 2025-03-02 RX ADMIN — PROPOFOL 5.62 MICROGRAM(S)/KG/MIN: 10 INJECTION, EMULSION INTRAVENOUS at 00:17

## 2025-03-02 RX ADMIN — GABAPENTIN 150 MILLIGRAM(S): 400 CAPSULE ORAL at 05:12

## 2025-03-02 RX ADMIN — SEVELAMER HYDROCHLORIDE 1600 MILLIGRAM(S): 800 TABLET ORAL at 22:42

## 2025-03-02 RX ADMIN — LAMOTRIGINE 25 MILLIGRAM(S): 150 TABLET ORAL at 05:12

## 2025-03-02 RX ADMIN — LEVETIRACETAM 750 MILLIGRAM(S): 10 INJECTION, SOLUTION INTRAVENOUS at 05:51

## 2025-03-02 RX ADMIN — GABAPENTIN 150 MILLIGRAM(S): 400 CAPSULE ORAL at 17:07

## 2025-03-02 RX ADMIN — Medication 1000 UNIT(S): at 11:50

## 2025-03-02 RX ADMIN — Medication 100 MILLIEQUIVALENT(S): at 08:06

## 2025-03-02 RX ADMIN — LACOSAMIDE 140 MILLIGRAM(S): 150 TABLET, FILM COATED ORAL at 05:12

## 2025-03-02 RX ADMIN — LEVETIRACETAM 750 MILLIGRAM(S): 10 INJECTION, SOLUTION INTRAVENOUS at 17:08

## 2025-03-02 RX ADMIN — Medication 4 MILLILITER(S): at 11:25

## 2025-03-02 RX ADMIN — Medication 2 TABLET(S): at 22:42

## 2025-03-02 RX ADMIN — ATORVASTATIN CALCIUM 20 MILLIGRAM(S): 80 TABLET, FILM COATED ORAL at 22:42

## 2025-03-02 RX ADMIN — SEVELAMER HYDROCHLORIDE 1600 MILLIGRAM(S): 800 TABLET ORAL at 13:14

## 2025-03-02 RX ADMIN — Medication 15 MILLILITER(S): at 17:07

## 2025-03-02 RX ADMIN — IPRATROPIUM BROMIDE AND ALBUTEROL SULFATE 3 MILLILITER(S): .5; 2.5 SOLUTION RESPIRATORY (INHALATION) at 23:15

## 2025-03-02 RX ADMIN — LACOSAMIDE 140 MILLIGRAM(S): 150 TABLET, FILM COATED ORAL at 17:06

## 2025-03-02 RX ADMIN — Medication 100 MILLIEQUIVALENT(S): at 05:12

## 2025-03-02 RX ADMIN — IPRATROPIUM BROMIDE AND ALBUTEROL SULFATE 3 MILLILITER(S): .5; 2.5 SOLUTION RESPIRATORY (INHALATION) at 05:26

## 2025-03-02 NOTE — PROGRESS NOTE ADULT - NSPROGADDITIONALINFOA_GEN_ALL_CORE
speech & swallow eval appreciated, s/p FEES: recommends thicken pureed diet.  events reviewed, RRT for hypoxia, intubated/sedated for increased work of breathing.   transferred to ICU. ICU eval appreciated  GOC discussed, full code per the sister.   Transferred to ICU, remain intubated.   Bumex gtt for overload, now off. weaned off pressors.   completed abx, completed antiviral.   ICU care appreciated.   on sedation vacation. vent management per ICU.  vEEG per ICU team     - Dr. SHIRA Leroy ([facility])  - [phone]

## 2025-03-02 NOTE — PROGRESS NOTE ADULT - PROBLEM SELECTOR PLAN 1
-Recent admission for acute respiratory failure in the setting of grand mal seizures, influenza, PNA. S/p intubation in MICU, was extubated to [facility]APS  -Now COVID +  -CXR with low lung volumes, mild congestion. Possible underlying infiltrate  -CT chest with b/l GGO, new since 1/30/25. Likely COVID PNA +/- superimposed bacterial PNA.   -Started on HFNC 2/18  -S/p multiple RRTs for hypoxia, increased WOB. Intubated s/p RRT on 2/23 & transferred to MICU  -Continue bronchodilators  -ABX per ID/MICU  -Pressure support trials as tolerated  -Keep sats >90%.  2/26: remains intubated:  on 40% fio2:  per MICU :  cont antibiotics:  cxr with Improvement in upper lobes  2/27: seems to be doing same:  remains intubated and sedated : on antibiotics and pressors:  cont current rx  3/1: no change in status:  remains the same:  intubated and sedated  3/2: remains intubated:  but on cpap trials now:  off sedation:  cont current weaning trials

## 2025-03-02 NOTE — PROGRESS NOTE ADULT - SUBJECTIVE AND OBJECTIVE BOX
SUBJECTIVE/ OVERNIGHT EVENTS:  intubated  sedated  in MICU  off bumex gtt  CT head no acute event.   EEG per ICU team     --------------------------------------------------------------------------------------------  LABS:                        7.7    9.10  )-----------( 116      ( 02 Mar 2025 03:49 )             24.3     03-02    139  |  95[L]  |  117[H]  ----------------------------<  165[H]  3.4[L]   |  22  |  4.11[H]    Ca    9.4      02 Mar 2025 03:48  Phos  9.1     03-02  Mg     2.6     03-02    TPro  7.5  /  Alb  2.9[L]  /  TBili  0.3  /  DBili  x   /  AST  45[H]  /  ALT  47[H]  /  AlkPhos  152[H]  03-02    PT/INR - ( 02 Mar 2025 03:49 )   PT: 11.3 sec;   INR: 0.98 ratio         PTT - ( 02 Mar 2025 03:49 )  PTT:59.7 sec  CAPILLARY BLOOD GLUCOSE      POCT Blood Glucose.: 148 mg/dL (02 Mar 2025 01:01)  POCT Blood Glucose.: 154 mg/dL (01 Mar 2025 19:17)  POCT Blood Glucose.: 148 mg/dL (01 Mar 2025 12:25)        Urinalysis Basic - ( 02 Mar 2025 03:48 )    Color: x / Appearance: x / SG: x / pH: x  Gluc: 165 mg/dL / Ketone: x  / Bili: x / Urobili: x   Blood: x / Protein: x / Nitrite: x   Leuk Esterase: x / RBC: x / WBC x   Sq Epi: x / Non Sq Epi: x / Bacteria: x        RADIOLOGY & ADDITIONAL TESTS:    Imaging Personally Reviewed:  [x] YES  [ ] NO    Consultant(s) Notes Reviewed:  [x] YES  [ ] NO    MEDICATIONS  (STANDING):  albuterol/ipratropium for Nebulization 3 milliLiter(s) Nebulizer every 6 hours  atorvastatin 20 milliGRAM(s) Oral at bedtime  chlorhexidine 0.12% Liquid 15 milliLiter(s) Oral Mucosa every 12 hours  cholecalciferol 1000 Unit(s) Oral daily  escitalopram 15 milliGRAM(s) Oral with breakfast  gabapentin Solution 150 milliGRAM(s) Oral every 12 hours  heparin  Infusion. 1700 Unit(s)/Hr (17 mL/Hr) IV Continuous <Continuous>  influenza  Vaccine (HIGH DOSE) 0.5 milliLiter(s) IntraMuscular once  lacosamide IVPB 200 milliGRAM(s) IV Intermittent every 12 hours  lamoTRIgine 25 milliGRAM(s) Oral two times a day  levETIRAcetam   Injectable 750 milliGRAM(s) IV Push every 12 hours  lurasidone 40 milliGRAM(s) Oral at bedtime  naloxegol 12.5 milliGRAM(s) Oral daily  pantoprazole   Suspension 40 milliGRAM(s) Oral daily  polyethylene glycol 3350 17 Gram(s) Oral daily  propofol Infusion 10 MICROgram(s)/kG/Min (5.62 mL/Hr) IV Continuous <Continuous>  senna 2 Tablet(s) Oral at bedtime  sevelamer carbonate Powder 1600 milliGRAM(s) Oral every 8 hours  sodium chloride 3%  Inhalation 4 milliLiter(s) Inhalation every 6 hours    MEDICATIONS  (PRN):  heparin   Injectable 7000 Unit(s) IV Push every 6 hours PRN For aPTT less than 40  heparin   Injectable 3500 Unit(s) IV Push every 6 hours PRN For aPTT between 40 - 57      Care Discussed with Consultants/Other Providers [x] YES  [ ] NO    Vital Signs Last 24 Hrs  T(C): 36.8 (02 Mar 2025 07:00), Max: 37.4 (01 Mar 2025 15:00)  T(F): 98.2 (02 Mar 2025 07:00), Max: 99.3 (01 Mar 2025 15:00)  HR: 85 (02 Mar 2025 09:10) (80 - 101)  BP: 166/77 (02 Mar 2025 08:00) (134/62 - 180/76)  BP(mean): 110 (02 Mar 2025 08:00) (82 - 115)  RR: 23 (02 Mar 2025 08:00) (20 - 30)  SpO2: 100% (02 Mar 2025 09:10) (90% - 100%)    Parameters below as of 02 Mar 2025 05:25  Patient On (Oxygen Delivery Method): ventilator      I&O's Summary    01 Mar 2025 07:01  -  02 Mar 2025 07:00  --------------------------------------------------------  IN: 2754.8 mL / OUT: 1920 mL / NET: 834.8 mL    02 Mar 2025 07:01  -  02 Mar 2025 11:26  --------------------------------------------------------  IN: 204 mL / OUT: 75 mL / NET: 129 mL      PHYSICAL EXAM:  GENERAL: intubated, sedated  HEAD:  Atraumatic, Normocephalic  EYES: EOMI, PERRLA, conjunctiva and sclera clear  NECK: Supple, No JVD  CHEST/LUNG: mild decrease breath sounds bilaterally; No wheeze   HEART: Regular rate and rhythm; No murmurs, rubs, or gallops  ABDOMEN: Soft, Nontender, Nondistended; Bowel sounds present  Neuro:  intubated, sedated  EXTREMITIES:  2+ Peripheral Pulses, No clubbing, cyanosis, or edema  SKIN: No rashes or lesions  SUBJECTIVE/ OVERNIGHT EVENTS:  intubated  on sedation vacation, able to move left arm.  in MICU  off bumex gtt  CT head no acute event.   EEG per ICU team     --------------------------------------------------------------------------------------------  LABS:                        7.7    9.10  )-----------( 116      ( 02 Mar 2025 03:49 )             24.3     03-02    139  |  95[L]  |  117[H]  ----------------------------<  165[H]  3.4[L]   |  22  |  4.11[H]    Ca    9.4      02 Mar 2025 03:48  Phos  9.1     03-02  Mg     2.6     03-02    TPro  7.5  /  Alb  2.9[L]  /  TBili  0.3  /  DBili  x   /  AST  45[H]  /  ALT  47[H]  /  AlkPhos  152[H]  03-02    PT/INR - ( 02 Mar 2025 03:49 )   PT: 11.3 sec;   INR: 0.98 ratio         PTT - ( 02 Mar 2025 03:49 )  PTT:59.7 sec  CAPILLARY BLOOD GLUCOSE      POCT Blood Glucose.: 148 mg/dL (02 Mar 2025 01:01)  POCT Blood Glucose.: 154 mg/dL (01 Mar 2025 19:17)  POCT Blood Glucose.: 148 mg/dL (01 Mar 2025 12:25)        Urinalysis Basic - ( 02 Mar 2025 03:48 )    Color: x / Appearance: x / SG: x / pH: x  Gluc: 165 mg/dL / Ketone: x  / Bili: x / Urobili: x   Blood: x / Protein: x / Nitrite: x   Leuk Esterase: x / RBC: x / WBC x   Sq Epi: x / Non Sq Epi: x / Bacteria: x        RADIOLOGY & ADDITIONAL TESTS:    Imaging Personally Reviewed:  [x] YES  [ ] NO    Consultant(s) Notes Reviewed:  [x] YES  [ ] NO    MEDICATIONS  (STANDING):  albuterol/ipratropium for Nebulization 3 milliLiter(s) Nebulizer every 6 hours  atorvastatin 20 milliGRAM(s) Oral at bedtime  chlorhexidine 0.12% Liquid 15 milliLiter(s) Oral Mucosa every 12 hours  cholecalciferol 1000 Unit(s) Oral daily  escitalopram 15 milliGRAM(s) Oral with breakfast  gabapentin Solution 150 milliGRAM(s) Oral every 12 hours  heparin  Infusion. 1700 Unit(s)/Hr (17 mL/Hr) IV Continuous <Continuous>  influenza  Vaccine (HIGH DOSE) 0.5 milliLiter(s) IntraMuscular once  lacosamide IVPB 200 milliGRAM(s) IV Intermittent every 12 hours  lamoTRIgine 25 milliGRAM(s) Oral two times a day  levETIRAcetam   Injectable 750 milliGRAM(s) IV Push every 12 hours  lurasidone 40 milliGRAM(s) Oral at bedtime  naloxegol 12.5 milliGRAM(s) Oral daily  pantoprazole   Suspension 40 milliGRAM(s) Oral daily  polyethylene glycol 3350 17 Gram(s) Oral daily  propofol Infusion 10 MICROgram(s)/kG/Min (5.62 mL/Hr) IV Continuous <Continuous>  senna 2 Tablet(s) Oral at bedtime  sevelamer carbonate Powder 1600 milliGRAM(s) Oral every 8 hours  sodium chloride 3%  Inhalation 4 milliLiter(s) Inhalation every 6 hours    MEDICATIONS  (PRN):  heparin   Injectable 7000 Unit(s) IV Push every 6 hours PRN For aPTT less than 40  heparin   Injectable 3500 Unit(s) IV Push every 6 hours PRN For aPTT between 40 - 57      Care Discussed with Consultants/Other Providers [x] YES  [ ] NO    Vital Signs Last 24 Hrs  T(C): 36.8 (02 Mar 2025 07:00), Max: 37.4 (01 Mar 2025 15:00)  T(F): 98.2 (02 Mar 2025 07:00), Max: 99.3 (01 Mar 2025 15:00)  HR: 85 (02 Mar 2025 09:10) (80 - 101)  BP: 166/77 (02 Mar 2025 08:00) (134/62 - 180/76)  BP(mean): 110 (02 Mar 2025 08:00) (82 - 115)  RR: 23 (02 Mar 2025 08:00) (20 - 30)  SpO2: 100% (02 Mar 2025 09:10) (90% - 100%)    Parameters below as of 02 Mar 2025 05:25  Patient On (Oxygen Delivery Method): ventilator      I&O's Summary    01 Mar 2025 07:01  -  02 Mar 2025 07:00  --------------------------------------------------------  IN: 2754.8 mL / OUT: 1920 mL / NET: 834.8 mL    02 Mar 2025 07:01  -  02 Mar 2025 11:26  --------------------------------------------------------  IN: 204 mL / OUT: 75 mL / NET: 129 mL      PHYSICAL EXAM:  GENERAL: intubated, on sedation vacation, awake.   HEAD:  Atraumatic, Normocephalic  EYES: EOMI, PERRLA, conjunctiva and sclera clear  NECK: Supple, No JVD  CHEST/LUNG: mild decrease breath sounds bilaterally; No wheeze   HEART: Regular rate and rhythm; No murmurs, rubs, or gallops  ABDOMEN: Soft, Nontender, Nondistended; Bowel sounds present  Neuro:  intubated, on sedation vacation  EXTREMITIES:  2+ Peripheral Pulses, No clubbing, cyanosis, or edema  SKIN: No rashes or lesions

## 2025-03-02 NOTE — PROGRESS NOTE ADULT - SUBJECTIVE AND OBJECTIVE BOX
[facility] HEMATOLOGY/ONCOLOGY INPATIENT PROGRESS NOTE     Interval Hx:   03-02-25: Mr. Gr was seen at bedside today.    Meds:   MEDICATIONS  (STANDING):  albuterol/ipratropium for Nebulization 3 milliLiter(s) Nebulizer every 6 hours  atorvastatin 20 milliGRAM(s) Oral at bedtime  buMETAnide Infusion 1 mG/Hr (5 mL/Hr) IV Continuous <Continuous>  chlorhexidine 0.12% Liquid 15 milliLiter(s) Oral Mucosa every 12 hours  cholecalciferol 1000 Unit(s) Oral daily  cloNIDine 0.1 milliGRAM(s) Oral every 8 hours  escitalopram 15 milliGRAM(s) Oral with breakfast  gabapentin Solution 150 milliGRAM(s) Oral every 12 hours  heparin  Infusion. 1700 Unit(s)/Hr (17 mL/Hr) IV Continuous <Continuous>  influenza  Vaccine (HIGH DOSE) 0.5 milliLiter(s) IntraMuscular once  lacosamide IVPB 200 milliGRAM(s) IV Intermittent every 12 hours  lamoTRIgine 25 milliGRAM(s) Oral two times a day  levETIRAcetam   Injectable 750 milliGRAM(s) IV Push every 12 hours  lurasidone 40 milliGRAM(s) Oral at bedtime  naloxegol 12.5 milliGRAM(s) Oral daily  pantoprazole   Suspension 40 milliGRAM(s) Oral daily  polyethylene glycol 3350 17 Gram(s) Oral daily  potassium chloride  10 mEq/100 mL IVPB 10 milliEquivalent(s) IV Intermittent every 1 hour  propofol Infusion 10 MICROgram(s)/kG/Min (5.62 mL/Hr) IV Continuous <Continuous>  senna 2 Tablet(s) Oral at bedtime  sevelamer carbonate Powder 1600 milliGRAM(s) Oral every 8 hours  sodium chloride 3%  Inhalation 4 milliLiter(s) Inhalation every 6 hours    MEDICATIONS  (PRN):  heparin   Injectable 7000 Unit(s) IV Push every 6 hours PRN For aPTT less than 40  heparin   Injectable 3500 Unit(s) IV Push every 6 hours PRN For aPTT between 40 - 57    Vital Signs Last 24 Hrs  T(C): 37.1 (02 Mar 2025 04:00), Max: 37.5 (01 Mar 2025 07:00)  T(F): 98.8 (02 Mar 2025 04:00), Max: 99.5 (01 Mar 2025 07:00)  HR: 92 (02 Mar 2025 05:25) (80 - 101)  BP: 143/68 (02 Mar 2025 05:00) (113/56 - 180/76)  BP(mean): 98 (02 Mar 2025 05:00) (80 - 115)  RR: 22 (02 Mar 2025 05:00) (20 - 30)  SpO2: 97% (02 Mar 2025 05:25) (94% - 100%)    Parameters below as of 02 Mar 2025 05:25  Patient On (Oxygen Delivery Method): ventilator    Physical Exam:  Gen: Intubated  HEENT: EOMI, MMM  Chest: equal chest rise  Cardiac: regular   Abd: non distended   Neuro: sedated    Labs:                        7.7    9.10  )-----------( 116      ( 02 Mar 2025 03:49 )             24.3     CBC Full  -  ( 02 Mar 2025 03:49 )  WBC Count : 9.10 K/uL  RBC Count : 2.39 M/uL  Hemoglobin : 7.7 g/dL  Hematocrit : 24.3 %  Platelet Count - Automated : 116 K/uL  Mean Cell Volume : 101.7 fl  Mean Cell Hemoglobin : 32.2 pg  Mean Cell Hemoglobin Concentration : 31.7 g/dL    03-02    139  |  95[L]  |  117[H]  ----------------------------<  165[H]  3.4[L]   |  22  |  4.11[H]    Ca    9.4      02 Mar 2025 03:48  Phos  9.1     03-02  Mg     2.6     03-02    TPro  7.5  /  Alb  2.9[L]  /  TBili  0.3  /  DBili  x   /  AST  45[H]  /  ALT  47[H]  /  AlkPhos  152[H]  03-02    PT/INR - ( 02 Mar 2025 03:49 )   PT: 11.3 sec;   INR: 0.98 ratio         PTT - ( 02 Mar 2025 03:49 )  PTT:59.7 sec  Bilirubin Total: 0.3 mg/dL (03-02-25 @ 03:48)   [facility] [place] HEMATOLOGY/ONCOLOGY INPATIENT PROGRESS NOTE     Interval Hx:   03-02-25: Mr. Gr was seen at bedside today, continues in MICU, nursing staff at bedside, no overnight events noted. CTH without acute intracranial pathology     Meds:   MEDICATIONS  (STANDING):  albuterol/ipratropium for Nebulization 3 milliLiter(s) Nebulizer every 6 hours  atorvastatin 20 milliGRAM(s) Oral at bedtime  buMETAnide Infusion 1 mG/Hr (5 mL/Hr) IV Continuous <Continuous>  chlorhexidine 0.12% Liquid 15 milliLiter(s) Oral Mucosa every 12 hours  cholecalciferol 1000 Unit(s) Oral daily  cloNIDine 0.1 milliGRAM(s) Oral every 8 hours  escitalopram 15 milliGRAM(s) Oral with breakfast  gabapentin Solution 150 milliGRAM(s) Oral every 12 hours  heparin  Infusion. 1700 Unit(s)/Hr (17 mL/Hr) IV Continuous <Continuous>  influenza  Vaccine (HIGH DOSE) 0.5 milliLiter(s) IntraMuscular once  lacosamide IVPB 200 milliGRAM(s) IV Intermittent every 12 hours  lamoTRIgine 25 milliGRAM(s) Oral two times a day  levETIRAcetam   Injectable 750 milliGRAM(s) IV Push every 12 hours  lurasidone 40 milliGRAM(s) Oral at bedtime  naloxegol 12.5 milliGRAM(s) Oral daily  pantoprazole   Suspension 40 milliGRAM(s) Oral daily  polyethylene glycol 3350 17 Gram(s) Oral daily  potassium chloride  10 mEq/100 mL IVPB 10 milliEquivalent(s) IV Intermittent every 1 hour  propofol Infusion 10 MICROgram(s)/kG/Min (5.62 mL/Hr) IV Continuous <Continuous>  senna 2 Tablet(s) Oral at bedtime  sevelamer carbonate Powder 1600 milliGRAM(s) Oral every 8 hours  sodium chloride 3%  Inhalation 4 milliLiter(s) Inhalation every 6 hours    MEDICATIONS  (PRN):  heparin   Injectable 7000 Unit(s) IV Push every 6 hours PRN For aPTT less than 40  heparin   Injectable 3500 Unit(s) IV Push every 6 hours PRN For aPTT between 40 - 57    Vital Signs Last 24 Hrs  T(C): 37.1 (02 Mar 2025 04:00), Max: 37.5 (01 Mar 2025 07:00)  T(F): 98.8 (02 Mar 2025 04:00), Max: 99.5 (01 Mar 2025 07:00)  HR: 92 (02 Mar 2025 05:25) (80 - 101)  BP: 143/68 (02 Mar 2025 05:00) (113/56 - 180/76)  BP(mean): 98 (02 Mar 2025 05:00) (80 - 115)  RR: 22 (02 Mar 2025 05:00) (20 - 30)  SpO2: 97% (02 Mar 2025 05:25) (94% - 100%)    Parameters below as of 02 Mar 2025 05:25  Patient On (Oxygen Delivery Method): ventilator    Physical Exam:  Gen: Intubated  HEENT: EOMI, MMM  Chest: equal chest rise  Cardiac: regular   Abd: non distended   Neuro: sedated    Labs:                        7.7    9.10  )-----------( 116      ( 02 Mar 2025 03:49 )             24.3     CBC Full  -  ( 02 Mar 2025 03:49 )  WBC Count : 9.10 K/uL  RBC Count : 2.39 M/uL  Hemoglobin : 7.7 g/dL  Hematocrit : 24.3 %  Platelet Count - Automated : 116 K/uL  Mean Cell Volume : 101.7 fl  Mean Cell Hemoglobin : 32.2 pg  Mean Cell Hemoglobin Concentration : 31.7 g/dL    03-02    139  |  95[L]  |  117[H]  ----------------------------<  165[H]  3.4[L]   |  22  |  4.11[H]    Ca    9.4      02 Mar 2025 03:48  Phos  9.1     03-02  Mg     2.6     03-02    TPro  7.5  /  Alb  2.9[L]  /  TBili  0.3  /  DBili  x   /  AST  45[H]  /  ALT  47[H]  /  AlkPhos  152[H]  03-02    PT/INR - ( 02 Mar 2025 03:49 )   PT: 11.3 sec;   INR: 0.98 ratio         PTT - ( 02 Mar 2025 03:49 )  PTT:59.7 sec  Bilirubin Total: 0.3 mg/dL (03-02-25 @ 03:48)

## 2025-03-02 NOTE — PROGRESS NOTE ADULT - PROBLEM SELECTOR PLAN 3
-CT chest with b/l GGO, new since 1/30/25. Likely COVID PNA +/- superimposed bacterial PNA  -Continue ABX.  2/27 : on antibiotics:  2/28: remains intubated and sedated  on vasopressors:  for septic shock  3/1: as above  3/2: off antibiotics now:

## 2025-03-02 NOTE — PROGRESS NOTE ADULT - SUBJECTIVE AND OBJECTIVE BOX
Date of Service: 03-02-25 @ 15:16    Patient is a 69y old  Male who presents with a chief complaint of Acute on chronic hypoxemic respiratory failure (02 Mar 2025 11:26)      Any change in ROS: pt seen and examined:  doing  ok :  no sob:   on cpap trials:  lethargic  off sedation:  tries to open his eyes on verbal commands:       MEDICATIONS  (STANDING):  albuterol/ipratropium for Nebulization 3 milliLiter(s) Nebulizer every 6 hours  atorvastatin 20 milliGRAM(s) Oral at bedtime  chlorhexidine 0.12% Liquid 15 milliLiter(s) Oral Mucosa every 12 hours  cholecalciferol 1000 Unit(s) Oral daily  escitalopram 15 milliGRAM(s) Oral with breakfast  gabapentin Solution 150 milliGRAM(s) Oral every 12 hours  heparin  Infusion. 1700 Unit(s)/Hr (17 mL/Hr) IV Continuous <Continuous>  influenza  Vaccine (HIGH DOSE) 0.5 milliLiter(s) IntraMuscular once  lacosamide IVPB 200 milliGRAM(s) IV Intermittent every 12 hours  lamoTRIgine 50 milliGRAM(s) Oral two times a day  levETIRAcetam   Injectable 750 milliGRAM(s) IV Push every 12 hours  lurasidone 40 milliGRAM(s) Oral at bedtime  naloxegol 12.5 milliGRAM(s) Oral daily  pantoprazole   Suspension 40 milliGRAM(s) Oral daily  polyethylene glycol 3350 17 Gram(s) Oral daily  propofol Infusion 10 MICROgram(s)/kG/Min (5.62 mL/Hr) IV Continuous <Continuous>  senna 2 Tablet(s) Oral at bedtime  sevelamer carbonate Powder 1600 milliGRAM(s) Oral every 8 hours  sodium chloride 3%  Inhalation 4 milliLiter(s) Inhalation every 6 hours    MEDICATIONS  (PRN):  heparin   Injectable 7000 Unit(s) IV Push every 6 hours PRN For aPTT less than 40  heparin   Injectable 3500 Unit(s) IV Push every 6 hours PRN For aPTT between 40 - 57    Vital Signs Last 24 Hrs  T(C): 36.8 (02 Mar 2025 07:00), Max: 37.3 (01 Mar 2025 20:00)  T(F): 98.2 (02 Mar 2025 07:00), Max: 99.1 (01 Mar 2025 20:00)  HR: 83 (02 Mar 2025 13:08) (80 - 101)  BP: 166/77 (02 Mar 2025 08:00) (134/62 - 180/76)  BP(mean): 110 (02 Mar 2025 08:00) (82 - 115)  RR: 23 (02 Mar 2025 08:00) (20 - 30)  SpO2: 100% (02 Mar 2025 13:08) (90% - 100%)    Parameters below as of 02 Mar 2025 11:39  Patient On (Oxygen Delivery Method): ventilator      Mode: CPAP with PS  FiO2: 40  PEEP: 6  PS: 12  MAP: 10  PIP: 20    I&O's Summary    01 Mar 2025 07:01  -  02 Mar 2025 07:00  --------------------------------------------------------  IN: 2754.8 mL / OUT: 1920 mL / NET: 834.8 mL    02 Mar 2025 07:01  -  02 Mar 2025 15:16  --------------------------------------------------------  IN: 204 mL / OUT: 75 mL / NET: 129 mL          Physical Exam:   GENERAL: NAD, well-groomed, well-developed  HEENT: BREE/   Atraumatic, Normocephalic  ENMT: No tonsillar erythema, exudates, or enlargement; Moist mucous membranes, Good dentition, No lesions  NECK: Supple, No JVD, Normal thyroid  CHEST/LUNG: Clear to auscultaion  CVS: Regular rate and rhythm; No murmurs, rubs, or gallops  GI: : Soft, Nontender, Nondistended; Bowel sounds present  NERVOUS SYSTEM:  letahrgic,  intubated  : tries to open eyes   EXTREMITIES:  trace edema  LYMPH: No lymphadenopathy noted  SKIN: No rashes or lesions  ENDOCRINOLOGY: No Thyromegaly  PSYCH: calm     Labs:  ABG - ( 02 Mar 2025 03:25 )  pH, Arterial: 7.39  pH, Blood: x     /  pCO2: 40    /  pO2: 107   / HCO3: 24    / Base Excess: -0.7  /  SaO2: 99.4            40                            7.0    9.10  )-----------( 112      ( 02 Mar 2025 12:01 )             22.4                         7.7    9.10  )-----------( 116      ( 02 Mar 2025 03:49 )             24.3                         7.4    7.36  )-----------( 101      ( 01 Mar 2025 00:50 )             23.3                         7.5    8.31  )-----------( 104      ( 28 Feb 2025 00:29 )             23.9                         7.5    6.50  )-----------( 104      ( 27 Feb 2025 00:24 )             23.4     03-02    139  |  95[L]  |  115[H]  ----------------------------<  145[H]  3.7   |  22  |  4.06[H]  03-02    139  |  95[L]  |  117[H]  ----------------------------<  165[H]  3.4[L]   |  22  |  4.11[H]  03-01    139  |  94[L]  |  104[H]  ----------------------------<  137[H]  3.6   |  22  |  4.28[H]  03-01    139  |  94[L]  |  100[H]  ----------------------------<  158[H]  3.7   |  21[L]  |  4.24[H]  02-28    137  |  92[L]  |  104[H]  ----------------------------<  176[H]  3.6   |  21[L]  |  3.89[H]  02-28    138  |  95[L]  |  98[H]  ----------------------------<  127[H]  3.5   |  21[L]  |  4.01[H]  02-27    140  |  95[L]  |  92[H]  ----------------------------<  179[H]  4.3   |  24  |  4.03[H]  02-27    142  |  98  |  87[H]  ----------------------------<  113[H]  3.9   |  22  |  4.08[H]  02-26    141  |  98  |  81[H]  ----------------------------<  187[H]  4.2   |  22  |  3.95[H]    Ca    9.3      02 Mar 2025 12:01  Ca    9.4      02 Mar 2025 03:48  Ca    9.4      01 Mar 2025 12:36  Ca    9.1      01 Mar 2025 00:50  Phos  8.9     03-02  Phos  9.1     03-02  Phos  9.3     03-01  Phos  8.9     03-01  Mg     2.7     03-02  Mg     2.6     03-02  Mg     2.6     03-01  Mg     2.5     03-01    TPro  7.5  /  Alb  2.9[L]  /  TBili  0.3  /  DBili  x   /  AST  45[H]  /  ALT  47[H]  /  AlkPhos  152[H]  03-02  TPro  7.4  /  Alb  2.8[L]  /  TBili  0.3  /  DBili  x   /  AST  37  /  ALT  47[H]  /  AlkPhos  148[H]  03-01  TPro  7.3  /  Alb  2.9[L]  /  TBili  0.3  /  DBili  x   /  AST  50[H]  /  ALT  54[H]  /  AlkPhos  166[H]  02-28  TPro  6.8  /  Alb  2.6[L]  /  TBili  0.3  /  DBili  x   /  AST  50[H]  /  ALT  46[H]  /  AlkPhos  153[H]  02-27    CAPILLARY BLOOD GLUCOSE      POCT Blood Glucose.: 137 mg/dL (02 Mar 2025 11:49)  POCT Blood Glucose.: 148 mg/dL (02 Mar 2025 01:01)  POCT Blood Glucose.: 154 mg/dL (01 Mar 2025 19:17)      LIVER FUNCTIONS - ( 02 Mar 2025 03:48 )  Alb: 2.9 g/dL / Pro: 7.5 g/dL / ALK PHOS: 152 U/L / ALT: 47 U/L / AST: 45 U/L / GGT: x           PT/INR - ( 02 Mar 2025 12:01 )   PT: 11.6 sec;   INR: 1.02 ratio         PTT - ( 02 Mar 2025 12:01 )  PTT:77.4 sec  Urinalysis Basic - ( 02 Mar 2025 12:01 )    Color: x / Appearance: x / SG: x / pH: x  Gluc: 145 mg/dL / Ketone: x  / Bili: x / Urobili: x   Blood: x / Protein: x / Nitrite: x   Leuk Esterase: x / RBC: x / WBC x   Sq Epi: x / Non Sq Epi: x / Bacteria: x    rad< from: CT Chest No Cont (02.26.25 @ 22:32) >    BONES: Degenerative changes of the spine with partially visualized   thoracolumbar surgical hardware. Chronic healed bilateral rib and right   clavicular fractures.      IMPRESSION:  Since 2/17/2025, while the upper lobe groundglass opacities have   improved, there are new and worsening confluent areas of   consolidation/pneumonia in the mid to lower lungs.    < end of copied text >          RECENT CULTURES:        RESPIRATORY CULTURES:          Studies  Chest X-RAY  CT SCAN Chest   Venous Dopplers: LE:   CT Abdomen  Others

## 2025-03-02 NOTE — CHART NOTE - NSCHARTNOTEFT_GEN_A_CORE
EEG Preliminary Report x 1 hour    No epileptiform activity.    Final report to follow.    Cristal Vines MD

## 2025-03-02 NOTE — PROGRESS NOTE ADULT - ASSESSMENT
69 year old male with a past medical history of hypertension, hyperlipemia VAZQUEZ (on CPAP at bedtime, NC 2 liters during the day), CKD stage 3, epilepsy, schizophrenia, developmental delay, metastatic testicular cancer (s/p orchiectomy, actively on chemotherapy, last dose of etoposide/cisplatin on 6/2024), presenting with acute on chronic hypoxemic respiratory failure, secondary to (a) COVID-19 infection, (b) superimposed pneumonia after recent influenza infection, and/or (c) fluid overload.     MICU consulted and accepted after RRT due to increased work of breathing    NEURO:  #Mental status:  - Sedated, however, was AAOx2 prior to increased work of breathing that required intubation  - prop was stopped to assess movement of left arm overnight, and has now been resumed     #Epilepsy:  - Continue with home meds which include Lacosamide, Lurasidone, Lamotrigine - lamotrigine restarted 2/24 after being held on 2/18, per pharmacy restarted at 25 BID, uptitrate over weeks, Keppra decreased to home dose    #Brain Mets:  - MRI brain now with 5.4 mm enhancing lesion in the LEFT middle cerebellar peduncle with associated edema suspicious for metastases    # RUE weakness  - Hold heparin gtt and obtain CT head    CV:  #Echo:  Recent echo last admission showing Left ventricular cavity is normal in size. Left ventricular systolic function is normal with an ejection fraction of 62 %. There are no regional wall motion abnormalities seen. Normal right ventricular cavity size and normal right ventricular systolic function.   - Repeat echo continuing to show EF 70%    #Afib w/RVR  - New onset Afib RVR (on tele, confirmed with EKG 2/19)   - Plan: Started metoprolol 5mg IVP q6 hours & Hep gtt -> metoprolol held iso hypotension  - If rate remains uncontrolled, can start Cardizem drip at 5mg/h  - c/w Heparin drip    #HTN:  - On Hydralazine 25mg TID, Amlodipine 5mg, held iso current hypotension    PULM:  #Vent   - Due to increased work of breathing, now intubated  - SBT as tolerated    #AHRF  - Patient admitted to the hospital for AHRF, found to be COVID (+)  - Likely 2/2 PNA, does not appear to CHF decompensation  - Continue with airway clearance regimen - duoneb, hypertonic saline, chest PT  - CT chest with b/l GGOs, possible early COVID fibrosis, area of consolidation ?infectious vs. atelectasis    RENAL:  #SHAGUFTA:   - Hx of CKD stage 3, Cr 2.38 at baseline, seems to be plateauing at 4.24  - In setting of COVID (+)  - Pre-renal, FeUrea 29%  - Monitor Cr  - Avoid nephrotoxic meds  - [place] in place  - Bumex 1 gtt  - Monitor BMP q12    GI:  No acute issues     #Diet: Tube feeds via OG tube  #Bowel Regimen  - On Senna and Miralax    ENDO:  No acute issues  Thyroid nodule noted in prior notes    HEMATOLOGIC:  #Thrombocytopenic  - Unclear origin  - Concern for possible mets to bone marrow? vs due to infection  - Continue to monitor  - Transfuse to maintain Plt>10K unless actively bleeding then maintain >50K  - Heme-Onc recs appreciated    #DVT prophylaxis   - SCD    ID:  #COVID-19  - Concern for possible superimposed PNA   - S/p zosyn 2/16- 2/28  - S/p Remdesivir  - Blood cx negative    SKIN:  #Lines:  2x PIV    Ethics:  - Full Code  - Contact: Sister Ester [phone]

## 2025-03-02 NOTE — PROGRESS NOTE ADULT - NS ATTEND AMEND GEN_ALL_CORE FT
·  Problem: Acute on chronic respiratory failure with hypoxia.   ·  Plan: Likely 2/2 PNA, HF  - Appreciate pulmonology.  - Transferred to MICU for hypoxia. Appreciate MICU care.   - 2/24 started on Bumex gtt at 2mg/hr  - 2/26 No appreciable JVD or peripheral edema. BP now soft with labile but stable Cr. Does not appear to be a HF. Recommend de-escalation of diuretics.   --- 2/26 CT Chest shows new and worsening confluent areas of consolidation/pneumonia in the mid to lower lungs.      Problem/Plan - 2:  ·  Problem: SHAGUFTA (acute kidney injury).   ·  Plan: Has a history of CKD stage 3, Cr 2.38 at baseline. Presents with Cr 3.27.   - Nephrology following  - BUN now >115    Problem/Plan - 3:  ·  Problem: Chronic heart failure with preserved ejection fraction.   ·  Plan: TTE done here 1/17/25 technically difficult, but LV systolic fxn appears nl without any regional wall motion abnormalities  - Repeat TTE pending  - BNP 5000 <---1100 but appears euvolemic.

## 2025-03-02 NOTE — PROGRESS NOTE ADULT - PROBLEM SELECTOR PLAN 7
Continue with home atorvastatin 20 mg bedtime for HLD, vitamin D supplementation, pantoprazole 40 mg daily, senna 2 tab bedtime, and Miralax 17 gram daily.     General  - DVT prophylaxis: heparin 5000 units q8h --> hep gtt per ICU  - Diet: regular   - Medication reconciliation: complete   - Code: Full   - Medications: Tylenol 650 mg q6h as needed for pain, Maalox 30 mL q4h as needed for acid reflux, melatonin 3 mg bedtime as needed for insomnia, Zofran 4 mg IV q8h as needed for nausea/vomiting      2/16/25 --> plan was discussed with patient's brother Fernando (over the phone, [phone]) in detail. He agrees with plan. All questions answered. He asked that I update Ester (sister, [phone]); she was called and updated as well, also agrees with the plan, also answered all questions.

## 2025-03-02 NOTE — PROGRESS NOTE ADULT - PROBLEM SELECTOR PLAN 3
Has a history of CKD stage 3, Cr 2.38 at baseline. Presents with Cr 3.27. Likely pre-renal.   - Renal US no hydronephrosis   - Fe Urea = 29%  - Monitor Cr daily   - Avoid nephrotoxins, dose medication to GFR  - Appreciate nephrology  - bumex gtt now off.

## 2025-03-02 NOTE — PROGRESS NOTE ADULT - ASSESSMENT
69 year old male with a past medical history of hypertension, hyperlipemia VAZQUEZ (on CPAP at bedtime, NC 2 liters during the day), CKD stage 3, epilepsy, schizophrenia, developmental delay, metastatic testicular cancer (s/p orchiectomy, actively on chemotherapy, last dose of etoposide/cisplatin on 6/2024) presenting with worsening shortness of breath. Patient was recently hospitalized at [facility] from January 27th 2025 to February 6th 2025 for seizure and influenza virus infection, which required intubation. He was sent to rehab (originally from home) from hospital. He returns due to sudden onset shortness of breath and worsening oxygenation. Of note, he tested positive for COVID-19       1- SHAGUFTA on CKDIII  2- covid-19  3- anemia  4- hypotension  bp is better   5- respiratory failure   6- chf       SHAGUFTA suspected in setting of new infection. covid 19   cr seems to be reaching a plateau  bun is higher   diurese prn only dc bumex drip   hyperphosphatemia   renvela 1600 mg tid feeding tube   suspect superimposed pna tx with zosyn 3.375 g iv bid  completed  dc gabapentin in this degree of renal function deterioration   anemia, trend hgb one unit prbc   d.w icu team when seen earlier

## 2025-03-02 NOTE — PROGRESS NOTE ADULT - ASSESSMENT
Mr. Gr is a 69 year old male with PMHx of seizure disorder, sleep apnea, HTN, chronic idiopathic constipation, stage III CKD, severe obesity, secondary hyperparathyroidism, anemia, thrombocytopenia, hyperlipidemia, testicular cancer under treatment with Dr. Ovalles who is admitted for acute hypoxic respiratory failure secondary to COVID vs superimposed pneumonia with new onset thrombocytopenia. He was recently discharged 02/06/2025 after a tenous stay including intubation in the ICU for respiratory failure in setting of seizure. He had recovered and was discharged to rehab.      Former smoker, quit 14y prior, denied ETOH, drug use. Lives at home. Does not have children. Denies family history of blood disorders or malignancy.  Denies previous workplace related exposures.  Denies previous history of blood transfusions.  Denied history of thromboses.  Denied history of  requiring anticoagulation.     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  Margins were negative.  pT3Nx. CT C/A/P with few left para-aortic and left iliac chain lymph nodes largest measuring 8.1 cm left para-aortic node, bilateral subcentimeter noncalcified pulmonary nodules measuring up to 7 mm. AFP, LDH, hCG were all elevated. 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 Started on adjuvant therapy with Cisplatin+Etoposide, so far completed 4 cycles of treatment with cisplatin dose reduced 50% and Etoposide 50% due to thrombocytopenia.      02/19/2025: on HFNC, noted tachycardia and fever, Klebsiella in urine as well, thrombocytopenia stable/improving, no signs of active bleeding     02/20/2025: developed A.fib with RVR and was placed on heparin drip and pending cardiology eval    02/21/2025: awake, on AVAPS overnight, noted RRT for hypoxia as pt removed HFNC at some point in time, good response thereafter     02/22/2025:  continues on AVAPS this morning, noted RRT overnight for hypoxia, hypercapnia, ICU following, US LE negative for DVT, counts overall stable/improved     02/23/2025: progressive respiratory failure, noted ongoing RTT this morning with pending intubation and transfer to MICU     02/24/2025: intubated 02/23/2025, sedated, on pressor support, no signs of bleeding, counts noted, no signs of bleeding     02/25/2025: continues in MICU, intubated and sedated, no signs of bleeding    02/26/2025: continues in MICU, intubated, without signs of bleeding, continues on heparin drip     02/27/2025:  remains under the care of the ICU, intubated, no signs of bleeding and continues on heparin drip, counts stable, has not required PRBC support this admission    02/28/2025: continues under the care of MICU, continues intubated, no signs of bleeding, on heparin drip    03/01/2025: continues in MICU, intubated, direct josefina IgG positive, eluate negative, not likely to be clinically significant       #Acute hypoxic respiratory failure  # COVID  - CT noted with bilateral GGO  - ID following  - Pulmonary following  - Antibiotics per primary team/MICU and ID   - Intubated 02/23/2025 AM, MICU     # Thrombocytopenia   - Did occur during most recent admission and improved to normal prior to discharge   - Without signs of bleeding  - Could be multifactorial, possibly due to infection   - Continue to trend  - Transfuse to maintain Plt > 10k unless actively bleeding then maintain > 50k     # Brain lesion  - pt with hx of seizures  - MRI brain now with 5.4 mm enhancing lesion in the LEFT middle cerebellar peduncle with associated edema suspicious for metastases  - MRI Lumbar negative for acute disease  - Small lesion without mass effect or edema, recommended to correlate per neurology if foci and locus are concordant with seizure activity  - Neurology recs noted, new lesion not likely to cause seizure  - It is rare, but nonetheless not impossible, for testicular cancer to be metastatic to the brain  - He will need another PET-CT, can be completed outpatient once stable if concern can proceed with biopsy at that time   - Previous endocrinology eval for thyroid nodule noted  - AFP/[facility] normal,  previously      # 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 Ovalles, agree with current plan  - Follow up as outpatient with Franki Ovalles MD     # Acute kidney injury on CKD Stage IIIA  - Likely multifactorial  - Nephrology consult and recs noted  - Continue to trend     # Normocytic anemia  - Chronic, has hx of PRBC during chemo 07/2024  - Noted Anti E, Anti-K Anti-C antibodies previously  - direct josefina IgG positive, eluate negative, not likely to be clinically significant   - Monitor for bleeding  - Recommend to transfuse to maintain Hb > 7    # Klebsiella UTI  -Antibiotics per ID and primary team       Recommendations  - Trend with CBC daily   - Transfuse to maintain Hb > 7   - Transfuse to maintain Plt >10k unless active bleeding then >50k   - Monitor renal function  - Antibiotics per primary team/MICU and ID   - Cardiology follow up to address anticoagulation, currently on heparin drip   - f/u ongoing ICU recs         Thank you for allowing me to participate in the care of Mr. Gr please do not hesitate to call or text me if you have further questions or concerns.     Brayan Mart MD  [facility] [place]   Division of Hematology/Oncology  [address], [address]  [place] [address]  P: [phone]  F: [phone]    Attestation:    ----Pt evaluated including face-to-face interaction in addition to chart review, reviewing treatment plan, and managing the patient’s chronic diagnoses as listed in the assessment----        Mr. Gr is a 69 year old male with PMHx of seizure disorder, sleep apnea, HTN, chronic idiopathic constipation, stage III CKD, severe obesity, secondary hyperparathyroidism, anemia, thrombocytopenia, hyperlipidemia, testicular cancer under treatment with Dr. Ovalles who is admitted for acute hypoxic respiratory failure secondary to COVID vs superimposed pneumonia with new onset thrombocytopenia. He was recently discharged 02/06/2025 after a tenous stay including intubation in the ICU for respiratory failure in setting of seizure. He had recovered and was discharged to rehab.      Former smoker, quit 14y prior, denied ETOH, drug use. Lives at home. Does not have children. Denies family history of blood disorders or malignancy.  Denies previous workplace related exposures.  Denies previous history of blood transfusions.  Denied history of thromboses.  Denied history of  requiring anticoagulation.     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  Margins were negative.  pT3Nx. CT C/A/P with few left para-aortic and left iliac chain lymph nodes largest measuring 8.1 cm left para-aortic node, bilateral subcentimeter noncalcified pulmonary nodules measuring up to 7 mm. AFP, LDH, hCG were all elevated. 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 Started on adjuvant therapy with Cisplatin+Etoposide, so far completed 4 cycles of treatment with cisplatin dose reduced 50% and Etoposide 50% due to thrombocytopenia.      02/19/2025: on HFNC, noted tachycardia and fever, Klebsiella in urine as well, thrombocytopenia stable/improving, no signs of active bleeding     02/20/2025: developed A.fib with RVR and was placed on heparin drip and pending cardiology eval    02/21/2025: awake, on AVAPS overnight, noted RRT for hypoxia as pt removed HFNC at some point in time, good response thereafter     02/22/2025:  continues on AVAPS this morning, noted RRT overnight for hypoxia, hypercapnia, ICU following, US LE negative for DVT, counts overall stable/improved     02/23/2025: progressive respiratory failure, noted ongoing RTT this morning with pending intubation and transfer to MICU     02/24/2025: intubated 02/23/2025, sedated, on pressor support, no signs of bleeding, counts noted, no signs of bleeding     02/25/2025: continues in MICU, intubated and sedated, no signs of bleeding    02/26/2025: continues in MICU, intubated, without signs of bleeding, continues on heparin drip     02/27/2025:  remains under the care of the ICU, intubated, no signs of bleeding and continues on heparin drip, counts stable, has not required PRBC support this admission    02/28/2025: continues under the care of MICU, continues intubated, no signs of bleeding, on heparin drip    03/01/2025: continues in MICU, intubated, direct josefina IgG positive, eluate negative, not likely to be clinically significant     03/02/2025:  continues in MICU, nursing staff at bedside, no overnight events noted. CTH without acute intracranial pathology         #Acute hypoxic respiratory failure  # COVID  - CT noted with bilateral GGO  - ID following  - Pulmonary following  - Antibiotics per primary team/MICU and ID   - Intubated 02/23/2025 AM, MICU     # Thrombocytopenia   - Did occur during most recent admission and improved to normal prior to discharge   - Without signs of bleeding  - Could be multifactorial, possibly due to infection   - Continue to trend  - Transfuse to maintain Plt > 10k unless actively bleeding then maintain > 50k     # Brain lesion  - pt with hx of seizures  - MRI brain now with 5.4 mm enhancing lesion in the LEFT middle cerebellar peduncle with associated edema suspicious for metastases  - MRI Lumbar negative for acute disease  - Small lesion without mass effect or edema, recommended to correlate per neurology if foci and locus are concordant with seizure activity  - Neurology recs noted, new lesion not likely to cause seizure  - It is rare, but nonetheless not impossible, for testicular cancer to be metastatic to the brain  - He will need another PET-CT, can be completed outpatient once stable if concern can proceed with biopsy at that time   - Previous endocrinology eval for thyroid nodule noted  - AFP/[facility] normal,  previously   - Repeat CTH without acute intracranial pathology       # 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 Ovalles, agree with current plan  - Follow up as outpatient with Franki Ovalles MD     # Acute kidney injury on CKD Stage IIIA  - Likely multifactorial  - Nephrology consult and recs noted  - Continue to trend     # Normocytic anemia  - Chronic, has hx of PRBC during chemo 07/2024  - Noted Anti E, Anti-K Anti-C antibodies previously  - direct josefina IgG positive, eluate negative, not likely to be clinically significant   - Monitor for bleeding  - Recommend to transfuse to maintain Hb > 7    # Klebsiella UTI  -Antibiotics per ID and primary team       Recommendations  - Trend with CBC daily   - Transfuse to maintain Hb > 7   - Transfuse to maintain Plt >10k unless active bleeding then >50k   - Monitor renal function  - Antibiotics per primary team/MICU and ID   - Cardiology follow up to address anticoagulation, currently on heparin drip   - f/u ongoing ICU recs         Thank you for allowing me to participate in the care of Mr. Gr please do not hesitate to call or text me if you have further questions or concerns.     Brayan Mart MD  [facility] [place]   Division of Hematology/Oncology  [facility], [address]  [place] [address]  P: [phone]  F: [phone]    Attestation:    ----Pt evaluated including face-to-face interaction in addition to chart review, reviewing treatment plan, and managing the patient’s chronic diagnoses as listed in the assessment----

## 2025-03-02 NOTE — PROGRESS NOTE ADULT - PROBLEM SELECTOR PLAN 4
TTE done here 1/17/25 technically difficult, but LV systolic fxn appears nl without any regional wall motion abnormalities  - BNP worsened this admission, but his symptoms are primarily due to COVID-19/bacterial PNA  - Strict I/Os  - Daily weights  - Repeat TTE reviewed: grossly normal LV  - Appreciate cardiology

## 2025-03-02 NOTE — PROGRESS NOTE ADULT - ATTENDING COMMENTS
68 y/o M w/hx as above including metastatic testicular cancer, epilepsy on AEDs, chronic respiratory failure with hypoxia and hypercapnia on home O2, HFpEF admitted for acute on chronic respiratory failure with hypoxia and hypercapnia likely secondary to combination of COVID PNA, bacterial superinfection, and acute pulmonary edema due to acute on chronic HFpEF. Hypotension likely severe sepsis with septic shock. SHAGUFTA likely ATN +/- venous congestion. Repeat CT chest with bilateral GGOs possibly acute pulmonary edema vs early fibrosis vs residual COVID along with L basilar consolidation likely bacterial PNA vs atelectasis.     # Acute on chronic respiratory failure with hypoxia and hypercapnia  # COVID PNA  # Bacterial PNA  # Acute pulmonary edema  # Acute on chronic HFpEF  # Hypotension  # Severe sepsis with septic shock  # SHAGUFTA  # Hypernatremia  #Metastatic Testicular Ca (brain met)  # Epilepsy    - Head CT negative for acute changes but noted opacif of right ethmoid/trace fluid sphenoid/partial opacif left mastoid  - VEEG monitoring initiated d/t concern for intermittent NCSE as patient with changing mental status (sometimes agitated alternating unresponsivess, blankly staring and no withdrawal)  - Continue AEDs (Lacosamide 200mg BID higher than home dose 100mg BID, Lamotrigine only 25mg home dose 100mg, Gabapentin, Keppra 750mg BID home dose)  - Tolerating PS for several hours but became tachypneic with copious inline/oral secretions requiring repeated suctioning  - Trend Cr, avoid nephrotoxins, stop Bumex gtt today, POCUS with IVC 1.4 with respiratory variation  - Completed course of abx 2/28  - Completed course of dexamethasone for COVID, completed remdesivir  - Duonebs + Hypersal for ACT  - DVT prophylaxis - on Hep gtt  - GOC: Full code

## 2025-03-02 NOTE — PROGRESS NOTE ADULT - SUBJECTIVE AND OBJECTIVE BOX
[facility]   [phone]  RENAL FOLLOW UP NOTE  --------------------------------------------------------------------------------  Chief Complaint:    24 hour events/subjective:    seen earlier intubated     PAST HISTORY  --------------------------------------------------------------------------------  No significant changes to PMH, PSH, FHx, SHx, unless otherwise noted    ALLERGIES & MEDICATIONS  --------------------------------------------------------------------------------  Allergies    seasonal allergies (Unknown)  penicillin (Hives)    Intolerances    latex (Rash)    Standing Inpatient Medications  albuterol/ipratropium for Nebulization 3 milliLiter(s) Nebulizer every 6 hours  atorvastatin 20 milliGRAM(s) Oral at bedtime  chlorhexidine 0.12% Liquid 15 milliLiter(s) Oral Mucosa every 12 hours  cholecalciferol 1000 Unit(s) Oral daily  dexMEDEtomidine Infusion 0.2 MICROgram(s)/kG/Hr IV Continuous <Continuous>  escitalopram 15 milliGRAM(s) Oral with breakfast  gabapentin Solution 150 milliGRAM(s) Oral every 12 hours  heparin  Infusion. 1700 Unit(s)/Hr IV Continuous <Continuous>  influenza  Vaccine (HIGH DOSE) 0.5 milliLiter(s) IntraMuscular once  lacosamide IVPB 200 milliGRAM(s) IV Intermittent every 12 hours  lamoTRIgine 50 milliGRAM(s) Oral two times a day  levETIRAcetam   Injectable 750 milliGRAM(s) IV Push every 12 hours  lurasidone 40 milliGRAM(s) Oral at bedtime  naloxegol 12.5 milliGRAM(s) Oral daily  pantoprazole   Suspension 40 milliGRAM(s) Oral daily  polyethylene glycol 3350 17 Gram(s) Oral daily  propofol Infusion 10 MICROgram(s)/kG/Min IV Continuous <Continuous>  senna 2 Tablet(s) Oral at bedtime  sevelamer carbonate Powder 1600 milliGRAM(s) Oral every 8 hours  sodium chloride 3%  Inhalation 4 milliLiter(s) Inhalation every 6 hours    PRN Inpatient Medications  heparin   Injectable 7000 Unit(s) IV Push every 6 hours PRN  heparin   Injectable 3500 Unit(s) IV Push every 6 hours PRN      REVIEW OF SYSTEMS  --------------------------------------------------------------------------------        VITALS/PHYSICAL EXAM  --------------------------------------------------------------------------------  T(C): 36.8 (03-02-25 @ 07:00), Max: 37.3 (03-01-25 @ 20:00)  HR: 96 (03-02-25 @ 15:28) (80 - 97)  BP: 166/77 (03-02-25 @ 08:00) (134/62 - 180/76)  RR: 23 (03-02-25 @ 08:00) (20 - 30)  SpO2: 100% (03-02-25 @ 15:28) (90% - 100%)  Wt(kg): --  Height (cm): 167.6 (03-01-25 @ 20:00)  Weight (kg): 89.1 (03-01-25 @ 20:00)  BMI (kg/m2): 31.7 (03-01-25 @ 20:00)  BSA (m2): 1.98 (03-01-25 @ 20:00)      03-01-25 @ 07:01  -  03-02-25 @ 07:00  --------------------------------------------------------  IN: 2754.8 mL / OUT: 1920 mL / NET: 834.8 mL    03-02-25 @ 07:01  -  03-02-25 @ 17:05  --------------------------------------------------------  IN: 204 mL / OUT: 75 mL / NET: 129 mL      Physical Exam:  	    Gen: ill appearing male, intubated   	Pulm: decrease bs, +coarse   	CV: No JVD. RRR, S1S2; no rub  	Abd: +BS, soft, nondistended  	: No suprapubic tenderness, external catheter  	UE: Warm, no cyanosis  no clubbing,  no edema;  	LE: Warm, no cyanosis  no clubbing, no edema    LABS/STUDIES  --------------------------------------------------------------------------------              7.0    9.10  >-----------<  112      [03-02-25 @ 12:01]              22.4     139  |  95  |  115  ----------------------------<  145      [03-02-25 @ 12:01]  3.7   |  22  |  4.06        Ca     9.3     [03-02-25 @ 12:01]      Mg     2.7     [03-02-25 @ 12:01]      Phos  8.9     [03-02-25 @ 12:01]    TPro  7.5  /  Alb  2.9  /  TBili  0.3  /  DBili  x   /  AST  45  /  ALT  47  /  AlkPhos  152  [03-02-25 @ 03:48]    PT/INR: PT 11.6 , INR 1.02       [03-02-25 @ 12:01]  PTT: 77.4       [03-02-25 @ 12:01]      Creatinine Trend:  SCr 4.06 [03-02 @ 12:01]  SCr 4.11 [03-02 @ 03:48]  SCr 4.28 [03-01 @ 12:36]  SCr 4.24 [03-01 @ 00:50]  SCr 3.89 [02-28 @ 12:33]            Ferritin [id number]      [02-23-25 @ 06:15]  TSH 0.87      [01-25-25 @ 11:31]

## 2025-03-02 NOTE — PROGRESS NOTE ADULT - PROBLEM SELECTOR PLAN 5
- Gabapentin 150 mg q12h (home med)   - Keppra 750 mg BID (home med)   - Lacosamide 100 mg BID (home med)   - Lurasidone 40 mg daily (home med)   - Lamotrigine 100 mg BID (home med)  - Escitalopram 15 mg daily (home med)  - medicaion via OG tube while intubated  - vEEG per ICU team

## 2025-03-02 NOTE — PROGRESS NOTE ADULT - SUBJECTIVE AND OBJECTIVE BOX
INTERVAL HPI/OVERNIGHT EVENTS: Had CT head performed; CTH unconcerning, heparin gtt restarted. Weaned off precedex. Required hydral 5mg for HTN.  Continued hypertension/tachycardia with c/f precedex withdrawal, started clonidine taper at 0.1 tid     SUBJECTIVE: Patient seen and examined at bedside. Tracking but not following commands or engaging in interview    At time of team rounds, pt following commands with L upper extremity and b/l lower extremities but minimally with R upper extremity    OBJECTIVE:  PHYSICAL EXAM:  GENERAL: Intubated, sedated but tracking  HEENT: NCAT  NECK: supple without JVD  CHEST/LUNG: mechanical breath sounds bilaterally  CARDIOVASCULAR: regular rate and rhythm, normal S1 and S2, no murmur/rub/gallop  ABDOMEN: positive bowel sounds, non-distended, no organomegaly   EXTREMITIES: no lower extremity edema, b/l upper extremities edematous, though improved in appearance  NEUROLOGY: sedated    -------  VENT: Mode: AC/ CMV (Assist Control/ Continuous Mandatory Ventilation), RR (machine): 16, TV (machine): 450, FiO2: 40, PEEP: 6, ITime: 1, MAP: 12  Drips:   Lines:   03-01-25 @ 07:01  -  03-02-25 @ 07:00  --------------------------------------------------------  OUT: 1845 mL        ===============================  VITAL SIGNS:  ICU Vital Signs Last 24 Hrs  T(C): 37.1 (02 Mar 2025 04:00), Max: 37.4 (01 Mar 2025 15:00)  T(F): 98.8 (02 Mar 2025 04:00), Max: 99.3 (01 Mar 2025 15:00)  HR: 90 (02 Mar 2025 06:00) (80 - 101)  BP: 145/73 (02 Mar 2025 06:00) (134/62 - 180/76)  BP(mean): 101 (02 Mar 2025 06:00) (82 - 115)  ABP: --  ABP(mean): --  RR: 23 (02 Mar 2025 06:00) (20 - 30)  SpO2: 90% (02 Mar 2025 06:00) (90% - 100%)    O2 Parameters below as of 02 Mar 2025 05:25  Patient On (Oxygen Delivery Method): ventilator          Plateau pressure:   P/F ratio:     I&O's Detail    01 Mar 2025 07:01  -  02 Mar 2025 07:00  --------------------------------------------------------  IN:    Bumetanide: 120 mL    Enteral Tube Flush: 530 mL    Heparin Infusion: 45 mL    Heparin Infusion: 170 mL    IV PiggyBack: 100 mL    IV PiggyBack: 370 mL    Propofol: 198.2 mL    Vital High Protein: 1235 mL  Total IN: 2768.2 mL    OUT:    Dexmedetomidine: 0 mL    Indwelling Catheter - Urethral (mL): 1845 mL  Total OUT: 1845 mL    Total NET: 923.2 mL        ===============================  MEDICATIONS:  MEDICATIONS  (STANDING):  albuterol/ipratropium for Nebulization 3 milliLiter(s) Nebulizer every 6 hours  atorvastatin 20 milliGRAM(s) Oral at bedtime  buMETAnide Infusion 1 mG/Hr (5 mL/Hr) IV Continuous <Continuous>  chlorhexidine 0.12% Liquid 15 milliLiter(s) Oral Mucosa every 12 hours  cholecalciferol 1000 Unit(s) Oral daily  cloNIDine 0.1 milliGRAM(s) Oral every 8 hours  escitalopram 15 milliGRAM(s) Oral with breakfast  gabapentin Solution 150 milliGRAM(s) Oral every 12 hours  heparin  Infusion. 1700 Unit(s)/Hr (17 mL/Hr) IV Continuous <Continuous>  influenza  Vaccine (HIGH DOSE) 0.5 milliLiter(s) IntraMuscular once  lacosamide IVPB 200 milliGRAM(s) IV Intermittent every 12 hours  lamoTRIgine 25 milliGRAM(s) Oral two times a day  levETIRAcetam   Injectable 750 milliGRAM(s) IV Push every 12 hours  lurasidone 40 milliGRAM(s) Oral at bedtime  naloxegol 12.5 milliGRAM(s) Oral daily  pantoprazole   Suspension 40 milliGRAM(s) Oral daily  polyethylene glycol 3350 17 Gram(s) Oral daily  potassium chloride  10 mEq/100 mL IVPB 10 milliEquivalent(s) IV Intermittent every 1 hour  propofol Infusion 10 MICROgram(s)/kG/Min (5.62 mL/Hr) IV Continuous <Continuous>  senna 2 Tablet(s) Oral at bedtime  sevelamer carbonate Powder 1600 milliGRAM(s) Oral every 8 hours  sodium chloride 3%  Inhalation 4 milliLiter(s) Inhalation every 6 hours    MEDICATIONS  (PRN):  heparin   Injectable 7000 Unit(s) IV Push every 6 hours PRN For aPTT less than 40  heparin   Injectable 3500 Unit(s) IV Push every 6 hours PRN For aPTT between 40 - 57    ===============================  ALLERGIES:  Allergies    seasonal allergies (Unknown)  penicillin (Hives)    Intolerances    latex (Rash)    ===============================  LABS:                        7.7    9.10  )-----------( 116      ( 02 Mar 2025 03:49 )             24.3     03-02    139  |  95[L]  |  117[H]  ----------------------------<  165[H]  3.4[L]   |  22  |  4.11[H]    Ca    9.4      02 Mar 2025 03:48  Phos  9.1     03-02  Mg     2.6     03-02    TPro  7.5  /  Alb  2.9[L]  /  TBili  0.3  /  DBili  x   /  AST  45[H]  /  ALT  47[H]  /  AlkPhos  152[H]  03-02    PT/INR - ( 02 Mar 2025 03:49 )   PT: 11.3 sec;   INR: 0.98 ratio         PTT - ( 02 Mar 2025 03:49 )  PTT:59.7 sec  Urinalysis Basic - ( 02 Mar 2025 03:48 )    Color: x / Appearance: x / SG: x / pH: x  Gluc: 165 mg/dL / Ketone: x  / Bili: x / Urobili: x   Blood: x / Protein: x / Nitrite: x   Leuk Esterase: x / RBC: x / WBC x   Sq Epi: x / Non Sq Epi: x / Bacteria: x      CAPILLARY BLOOD GLUCOSE      POCT Blood Glucose.: 148 mg/dL (02 Mar 2025 01:01)    ABG:  pH, Arterial: 7.39 (03-02-25 @ 03:25)  pCO2, Arterial: 40 mmHg (03-02-25 @ 03:25)  pO2, Arterial: 107 mmHg (03-02-25 @ 03:25)    vBG:    Micro:    Culture - Blood (collected 02-21-25 @ 19:36)  Source: .Blood Blood  Final Report (02-27-25 @ 02:01):    No growth at 5 days    Culture - Blood (collected 02-21-25 @ 19:35)  Source: .Blood Blood  Final Report (02-27-25 @ 02:01):    No growth at 5 days    Culture - Blood (collected 02-19-25 @ 00:18)  Source: .Blood Blood-Peripheral  Final Report (02-24-25 @ 03:01):    No growth at 5 days    Culture - Blood (collected 02-19-25 @ 00:18)  Source: .Blood Blood-Peripheral  Final Report (02-24-25 @ 03:01):    No growth at 5 days    Culture - Blood (collected 02-16-25 @ 09:35)  Source: .Blood BLOOD  Final Report (02-21-25 @ 15:01):    No growth at 5 days    Culture - Blood (collected 02-16-25 @ 09:25)  Source: .Blood BLOOD  Final Report (02-21-25 @ 15:01):    No growth at 5 days        Culture - Sputum (collected 02-17-25 @ 22:23)  Source: .Sputum Sputum  Gram Stain (02-18-25 @ 06:21):    Few polymorphonuclear leukocytes per low power field    No Squamous epithelial cells per low power field    Few Gram Variable Rods seen per oil power field    Rare Gram positive cocci in pairs seen per oil power field  Final Report (02-19-25 @ 16:20):    Commensal lucia consistent with body site          ===============================  ECG: reviewed.  RADIOLOGY & ADDITIONAL TESTS: Reviewed.

## 2025-03-02 NOTE — PROGRESS NOTE ADULT - ASSESSMENT
70 y/o M with PMH of HTN, HLD, VAZQUEZ on CPAP and home O2 (2LNC daytime), CKD, epilepsy, schizophrenia developmental delay, metastatic testicular CA. Recent admission at [facility] for acute respiratory failure in the setting of seizures, influenza infection, pulmonary edema, PNA requiring intubation in MICU. Was eventually discharged to rehab. Presents now with SOB, found to be COVID + at facility on 2/13. Febrile on admission to ED to 101.9F. Placed on Bipap for increased WOB. Pulmonary called to consult for acute respiratory failure.

## 2025-03-02 NOTE — PROGRESS NOTE ADULT - SUBJECTIVE AND OBJECTIVE BOX
DATE OF SERVICE: 03-02-25 @ 15:57    Patient is a 69y old  Male who presents with a chief complaint of Acute on chronic hypoxemic respiratory failure (02 Mar 2025 15:15)      INTERVAL HISTORY: Intubated and sedated.     REVIEW OF SYSTEMS: Unable to participate fully in ROS  CONSTITUTIONAL: No weakness  EYES/ENT: No visual changes;  No throat pain   NECK: No pain or stiffness  RESPIRATORY: No cough, wheezing; No shortness of breath  CARDIOVASCULAR: No chest pain or palpitations  GASTROINTESTINAL: No abdominal  pain. No nausea, vomiting, or hematemesis  GENITOURINARY: No dysuria, frequency or hematuria  NEUROLOGICAL: No stroke like symptoms  SKIN: No rashes    TELEMETRY Personally reviewed: SR  	  MEDICATIONS:        PHYSICAL EXAM:  T(C): 36.8 (03-02-25 @ 07:00), Max: 37.3 (03-01-25 @ 20:00)  HR: 96 (03-02-25 @ 15:28) (80 - 101)  BP: 166/77 (03-02-25 @ 08:00) (134/62 - 180/76)  RR: 23 (03-02-25 @ 08:00) (20 - 30)  SpO2: 100% (03-02-25 @ 15:28) (90% - 100%)  Wt(kg): --  I&O's Summary    01 Mar 2025 07:01  -  02 Mar 2025 07:00  --------------------------------------------------------  IN: 2754.8 mL / OUT: 1920 mL / NET: 834.8 mL    02 Mar 2025 07:01  -  02 Mar 2025 15:57  --------------------------------------------------------  IN: 204 mL / OUT: 75 mL / NET: 129 mL      Height (cm): 167.6 (03-01 @ 20:00)  Weight (kg): 89.1 (03-01 @ 20:00)  BMI (kg/m2): 31.7 (03-01 @ 20:00)  BSA (m2): 1.98 (03-01 @ 20:00)    Appearance: In no distress	  HEENT:    PERRL, EOMI	  Cardiovascular:  S1 S2, No JVD  Respiratory: Lungs clear to auscultation	  Gastrointestinal:  Soft, Non-tender, + BS	  Vascularature:  No edema of LE  Psychiatric: Appropriate affect   Neuro: no acute focal deficits                               7.0    9.10  )-----------( 112      ( 02 Mar 2025 12:01 )             22.4     03-02    139  |  95[L]  |  115[H]  ----------------------------<  145[H]  3.7   |  22  |  4.06[H]    Ca    9.3      02 Mar 2025 12:01  Phos  8.9     03-02  Mg     2.7     03-02    TPro  7.5  /  Alb  2.9[L]  /  TBili  0.3  /  DBili  x   /  AST  45[H]  /  ALT  47[H]  /  AlkPhos  152[H]  03-02        Labs personally reviewed      ASSESSMENT/PLAN: 	          69 year old male with a past medical history of hypertension, hyperlipemia VAZQUEZ (on CPAP at bedtime, NC 2 liters during the day), CKD stage 3, epilepsy, schizophrenia, developmental delay, metastatic testicular cancer (s/p orchiectomy, actively on chemotherapy, last dose of etoposide/cisplatin on 6/2024), presenting with acute on chronic hypoxemic respiratory failure, secondary to (a) COVID-19 infection, (b) superimposed pneumonia after recent influenza infection, and/or (c) fluid overload.     Problem/Plan - 1:  ·  Problem: Acute on chronic respiratory failure with hypoxia.   ·  Plan: Likely 2/2 PNA, HF  - Appreciate pulmonology.  - Transferred to MICU for hypoxia. Appreciate MICU care.   - 2/24 started on Bumex gtt at 2mg/hr  - 2/26 No appreciable JVD or peripheral edema. BP now soft with labile but stable Cr. Does not appear to be a HF. Recommend de-escalation of diuretics.   --- 2/26 CT Chest shows new and worsening confluent areas of consolidation/pneumonia in the mid to lower lungs.      Problem/Plan - 2:  ·  Problem: SHAGUFTA (acute kidney injury).   ·  Plan: Has a history of CKD stage 3, Cr 2.38 at baseline. Presents with Cr 3.27.   - Nephrology following  - Will trend closely while on Bumex gtt     Problem/Plan - 3:  ·  Problem: Chronic heart failure with preserved ejection fraction.   ·  Plan: TTE done here 1/17/25 technically difficult, but LV systolic fxn appears nl without any regional wall motion abnormalities  - Repeat TTE pending  - BNP 5000 <---1100 but appears euvolemic   - 2/24 decreased UOP and net positive, bumex gtt started at 2mg/hr for goal -1-2 L,  per MICU     Problem/Plan - 4:  ·  Problem: New onset Afib RVR (on tele, confirmed with EKG 2/19)   ·  Plan: Started metoprolol 5mg IVP q6 hours & Hep gtt  - If rate remains uncontrolled, can start Cardizem drip at 5mg/h  - Initiated hep gtt and will transition to Eliquis     Problem/Plan - 5:  ·  Problem: HTN    ·  Plan: Start Hydral 25mg PO TID for elevated BP now that it has recovered    Problem/Plan - 6:  ·  Problem: Prophylactic measure.   ·  Plan:  DVT prophylaxis: heparin gtt      LAVELLE August DO [facility]  Cardiovascular Medicine  [address] [place] [address], [address]  Available through call or text on [employer] TEAM[employer]  Office: [phone]   DATE OF SERVICE: 03-02-25 @ 15:57    Patient is a 69y old  Male who presents with a chief complaint of Acute on chronic hypoxemic respiratory failure (02 Mar 2025 15:15)      INTERVAL HISTORY: Intubated and sedated.     REVIEW OF SYSTEMS: Unable to participate fully in ROS  CONSTITUTIONAL: No weakness  EYES/ENT: No visual changes;  No throat pain   NECK: No pain or stiffness  RESPIRATORY: No cough, wheezing; No shortness of breath  CARDIOVASCULAR: No chest pain or palpitations  GASTROINTESTINAL: No abdominal  pain. No nausea, vomiting, or hematemesis  GENITOURINARY: No dysuria, frequency or hematuria  NEUROLOGICAL: No stroke like symptoms  SKIN: No rashes    TELEMETRY Personally reviewed: SR  	  MEDICATIONS:        PHYSICAL EXAM:  T(C): 36.8 (03-02-25 @ 07:00), Max: 37.3 (03-01-25 @ 20:00)  HR: 96 (03-02-25 @ 15:28) (80 - 101)  BP: 166/77 (03-02-25 @ 08:00) (134/62 - 180/76)  RR: 23 (03-02-25 @ 08:00) (20 - 30)  SpO2: 100% (03-02-25 @ 15:28) (90% - 100%)  Wt(kg): --  I&O's Summary    01 Mar 2025 07:01  -  02 Mar 2025 07:00  --------------------------------------------------------  IN: 2754.8 mL / OUT: 1920 mL / NET: 834.8 mL    02 Mar 2025 07:01  -  02 Mar 2025 15:57  --------------------------------------------------------  IN: 204 mL / OUT: 75 mL / NET: 129 mL      Height (cm): 167.6 (03-01 @ 20:00)  Weight (kg): 89.1 (03-01 @ 20:00)  BMI (kg/m2): 31.7 (03-01 @ 20:00)  BSA (m2): 1.98 (03-01 @ 20:00)    Appearance: In no distress	  HEENT:    PERRL, EOMI	  Cardiovascular:  S1 S2, No JVD  Respiratory: Lungs clear to auscultation	  Gastrointestinal:  Soft, Non-tender, + BS	  Vascularature:  No edema of LE  Psychiatric: Appropriate affect   Neuro: no acute focal deficits                               7.0    9.10  )-----------( 112      ( 02 Mar 2025 12:01 )             22.4     03-02    139  |  95[L]  |  115[H]  ----------------------------<  145[H]  3.7   |  22  |  4.06[H]    Ca    9.3      02 Mar 2025 12:01  Phos  8.9     03-02  Mg     2.7     03-02    TPro  7.5  /  Alb  2.9[L]  /  TBili  0.3  /  DBili  x   /  AST  45[H]  /  ALT  47[H]  /  AlkPhos  152[H]  03-02        Labs personally reviewed      ASSESSMENT/PLAN: 	    69 year old male with a past medical history of hypertension, hyperlipemia VAZQUEZ (on CPAP at bedtime, NC 2 liters during the day), CKD stage 3, epilepsy, schizophrenia, developmental delay, metastatic testicular cancer (s/p orchiectomy, actively on chemotherapy, last dose of etoposide/cisplatin on 6/2024), presenting with acute on chronic hypoxemic respiratory failure, secondary to (a) COVID-19 infection, (b) superimposed pneumonia after recent influenza infection, and/or (c) fluid overload.     Problem/Plan - 1:  ·  Problem: Acute on chronic respiratory failure with hypoxia.   ·  Plan: Likely 2/2 PNA, HF  - Appreciate pulmonology.  - Transferred to MICU for hypoxia. Appreciate MICU care.   - 2/24 started on Bumex gtt at 2mg/hr  - 2/26 No appreciable JVD or peripheral edema. BP now soft with labile but stable Cr. Does not appear to be a HF. Recommend de-escalation of diuretics.   --- 2/26 CT Chest shows new and worsening confluent areas of consolidation/pneumonia in the mid to lower lungs.      Problem/Plan - 2:  ·  Problem: SHAGUFTA (acute kidney injury).   ·  Plan: Has a history of CKD stage 3, Cr 2.38 at baseline. Presents with Cr 3.27.   - Nephrology following  - BUN now >115    Problem/Plan - 3:  ·  Problem: Chronic heart failure with preserved ejection fraction.   ·  Plan: TTE done here 1/17/25 technically difficult, but LV systolic fxn appears nl without any regional wall motion abnormalities  - Repeat TTE pending  - BNP 5000 <---1100 but appears euvolemic     Problem/Plan - 4:  ·  Problem: New onset Afib RVR (on tele, confirmed with EKG 2/19)   ·  Plan: Started metoprolol 5mg IVP q6 hours & Hep gtt  - If rate remains uncontrolled, can start Cardizem drip at 5mg/h  - Initiated hep gtt and will transition to Eliquis     Problem/Plan - 5:  ·  Problem: HTN    ·  Plan: Start Hydral 25mg PO TID for elevated BP now that it has recovered    Problem/Plan - 6:  ·  Problem: Prophylactic measure.   ·  Plan:  DVT prophylaxis: heparin gtt      Magaly Laird, REILLY-DARIAN Andrew DO [facility]  Cardiovascular Medicine  [address] [place] [address], [address]  Available through call or text on [employer] TEAM[employer]  Office: [phone]

## 2025-03-03 ENCOUNTER — APPOINTMENT (OUTPATIENT)
Dept: PULMONOLOGY | Facility: CLINIC | Age: 70
End: 2025-03-03

## 2025-03-03 LAB
ALBUMIN SERPL ELPH-MCNC: 2.7 G/DL — LOW (ref 3.3–5)
ALP SERPL-CCNC: 131 U/L — HIGH (ref 40–120)
ALT FLD-CCNC: 37 U/L — SIGNIFICANT CHANGE UP (ref 10–45)
ANION GAP SERPL CALC-SCNC: 21 MMOL/L — HIGH (ref 5–17)
ANISOCYTOSIS BLD QL: SLIGHT — SIGNIFICANT CHANGE UP
APTT BLD: 75.3 SEC — HIGH (ref 24.5–35.6)
AST SERPL-CCNC: 35 U/L — SIGNIFICANT CHANGE UP (ref 10–40)
BASOPHILS # BLD AUTO: 0.01 K/UL — SIGNIFICANT CHANGE UP (ref 0–0.2)
BASOPHILS # BLD AUTO: 0.07 K/UL — SIGNIFICANT CHANGE UP (ref 0–0.2)
BASOPHILS NFR BLD AUTO: 0.1 % — SIGNIFICANT CHANGE UP (ref 0–2)
BASOPHILS NFR BLD AUTO: 0.9 % — SIGNIFICANT CHANGE UP (ref 0–2)
BILIRUB SERPL-MCNC: 0.3 MG/DL — SIGNIFICANT CHANGE UP (ref 0.2–1.2)
BUN SERPL-MCNC: 121 MG/DL — HIGH (ref 7–23)
CALCIUM SERPL-MCNC: 9.3 MG/DL — SIGNIFICANT CHANGE UP (ref 8.4–10.5)
CHLORIDE SERPL-SCNC: 94 MMOL/L — LOW (ref 96–108)
CO2 SERPL-SCNC: 22 MMOL/L — SIGNIFICANT CHANGE UP (ref 22–31)
CREAT SERPL-MCNC: 4.34 MG/DL — HIGH (ref 0.5–1.3)
DACRYOCYTES BLD QL SMEAR: SLIGHT — SIGNIFICANT CHANGE UP
EGFR: 14 ML/MIN/1.73M2 — LOW
EGFR: 14 ML/MIN/1.73M2 — LOW
EOSINOPHIL # BLD AUTO: 0 K/UL — SIGNIFICANT CHANGE UP (ref 0–0.5)
EOSINOPHIL # BLD AUTO: 0.19 K/UL — SIGNIFICANT CHANGE UP (ref 0–0.5)
EOSINOPHIL NFR BLD AUTO: 0 % — SIGNIFICANT CHANGE UP (ref 0–6)
EOSINOPHIL NFR BLD AUTO: 2.7 % — SIGNIFICANT CHANGE UP (ref 0–6)
GAS PNL BLDA: SIGNIFICANT CHANGE UP
GLUCOSE BLDC GLUCOMTR-MCNC: 126 MG/DL — HIGH (ref 70–99)
GLUCOSE BLDC GLUCOMTR-MCNC: 188 MG/DL — HIGH (ref 70–99)
GLUCOSE SERPL-MCNC: 156 MG/DL — HIGH (ref 70–99)
HCT VFR BLD CALC: 18 % — CRITICAL LOW (ref 39–50)
HCT VFR BLD CALC: 21.9 % — LOW (ref 39–50)
HCT VFR BLD CALC: 22.5 % — LOW (ref 39–50)
HGB BLD-MCNC: 5.5 G/DL — CRITICAL LOW (ref 13–17)
HGB BLD-MCNC: 6.8 G/DL — CRITICAL LOW (ref 13–17)
HGB BLD-MCNC: 7.4 G/DL — LOW (ref 13–17)
HYPOCHROMIA BLD QL: SLIGHT — SIGNIFICANT CHANGE UP
IMM GRANULOCYTES NFR BLD AUTO: 3.4 % — HIGH (ref 0–0.9)
INR BLD: 1.05 RATIO — SIGNIFICANT CHANGE UP (ref 0.85–1.16)
LYMPHOCYTES # BLD AUTO: 0.49 K/UL — LOW (ref 1–3.3)
LYMPHOCYTES # BLD AUTO: 0.7 K/UL — LOW (ref 1–3.3)
LYMPHOCYTES # BLD AUTO: 6.1 % — LOW (ref 13–44)
LYMPHOCYTES # BLD AUTO: 9.9 % — LOW (ref 13–44)
MAGNESIUM SERPL-MCNC: 2.9 MG/DL — HIGH (ref 1.6–2.6)
MANUAL SMEAR VERIFICATION: SIGNIFICANT CHANGE UP
MCHC RBC-ENTMCNC: 30.6 G/DL — LOW (ref 32–36)
MCHC RBC-ENTMCNC: 31.1 G/DL — LOW (ref 32–36)
MCHC RBC-ENTMCNC: 31.6 PG — SIGNIFICANT CHANGE UP (ref 27–34)
MCHC RBC-ENTMCNC: 31.8 PG — SIGNIFICANT CHANGE UP (ref 27–34)
MCHC RBC-ENTMCNC: 32.7 PG — SIGNIFICANT CHANGE UP (ref 27–34)
MCHC RBC-ENTMCNC: 32.9 G/DL — SIGNIFICANT CHANGE UP (ref 32–36)
MCV RBC AUTO: 102.3 FL — HIGH (ref 80–100)
MCV RBC AUTO: 103.4 FL — HIGH (ref 80–100)
MCV RBC AUTO: 99.6 FL — SIGNIFICANT CHANGE UP (ref 80–100)
MICROCYTES BLD QL: SLIGHT — SIGNIFICANT CHANGE UP
MONOCYTES # BLD AUTO: 0.28 K/UL — SIGNIFICANT CHANGE UP (ref 0–0.9)
MONOCYTES # BLD AUTO: 0.33 K/UL — SIGNIFICANT CHANGE UP (ref 0–0.9)
MONOCYTES NFR BLD AUTO: 3.5 % — SIGNIFICANT CHANGE UP (ref 2–14)
MONOCYTES NFR BLD AUTO: 4.7 % — SIGNIFICANT CHANGE UP (ref 2–14)
NEUTROPHILS # BLD AUTO: 5.59 K/UL — SIGNIFICANT CHANGE UP (ref 1.8–7.4)
NEUTROPHILS # BLD AUTO: 7.18 K/UL — SIGNIFICANT CHANGE UP (ref 1.8–7.4)
NEUTROPHILS NFR BLD AUTO: 79.2 % — HIGH (ref 43–77)
NEUTROPHILS NFR BLD AUTO: 89.5 % — HIGH (ref 43–77)
NRBC BLD AUTO-RTO: 0 /100 WBCS — SIGNIFICANT CHANGE UP (ref 0–0)
NRBC BLD AUTO-RTO: 0 /100 WBCS — SIGNIFICANT CHANGE UP (ref 0–0)
OVALOCYTES BLD QL SMEAR: SLIGHT — SIGNIFICANT CHANGE UP
PHOSPHATE SERPL-MCNC: 9.9 MG/DL — HIGH (ref 2.5–4.5)
PLAT MORPH BLD: ABNORMAL
PLATELET # BLD AUTO: 108 K/UL — LOW (ref 150–400)
PLATELET # BLD AUTO: 115 K/UL — LOW (ref 150–400)
PLATELET # BLD AUTO: 117 K/UL — LOW (ref 150–400)
POLYCHROMASIA BLD QL SMEAR: SLIGHT — SIGNIFICANT CHANGE UP
POTASSIUM SERPL-MCNC: 4.2 MMOL/L — SIGNIFICANT CHANGE UP (ref 3.5–5.3)
POTASSIUM SERPL-SCNC: 4.2 MMOL/L — SIGNIFICANT CHANGE UP (ref 3.5–5.3)
PROT SERPL-MCNC: 7.3 G/DL — SIGNIFICANT CHANGE UP (ref 6–8.3)
PROTHROM AB SERPL-ACNC: 12 SEC — SIGNIFICANT CHANGE UP (ref 9.9–13.4)
RBC # BLD: 1.74 M/UL — LOW (ref 4.2–5.8)
RBC # BLD: 2.14 M/UL — LOW (ref 4.2–5.8)
RBC # BLD: 2.26 M/UL — LOW (ref 4.2–5.8)
RBC # FLD: 16.1 % — HIGH (ref 10.3–14.5)
RBC # FLD: 16.1 % — HIGH (ref 10.3–14.5)
RBC # FLD: 16.3 % — HIGH (ref 10.3–14.5)
RBC BLD AUTO: ABNORMAL
SODIUM SERPL-SCNC: 137 MMOL/L — SIGNIFICANT CHANGE UP (ref 135–145)
WBC # BLD: 7.06 K/UL — SIGNIFICANT CHANGE UP (ref 3.8–10.5)
WBC # BLD: 7.14 K/UL — SIGNIFICANT CHANGE UP (ref 3.8–10.5)
WBC # BLD: 8.02 K/UL — SIGNIFICANT CHANGE UP (ref 3.8–10.5)
WBC # FLD AUTO: 7.06 K/UL — SIGNIFICANT CHANGE UP (ref 3.8–10.5)
WBC # FLD AUTO: 7.14 K/UL — SIGNIFICANT CHANGE UP (ref 3.8–10.5)
WBC # FLD AUTO: 8.02 K/UL — SIGNIFICANT CHANGE UP (ref 3.8–10.5)

## 2025-03-03 PROCEDURE — 95718 EEG PHYS/QHP 2-12 HR W/VEEG: CPT

## 2025-03-03 PROCEDURE — 99291 CRITICAL CARE FIRST HOUR: CPT | Mod: FS

## 2025-03-03 RX ORDER — HEPARIN SODIUM 1000 [USP'U]/ML
1700 INJECTION INTRAVENOUS; SUBCUTANEOUS
Qty: 25000 | Refills: 0 | Status: DISCONTINUED | OUTPATIENT
Start: 2025-03-03 | End: 2025-03-10

## 2025-03-03 RX ORDER — LURASIDONE HYDROCHLORIDE 120 MG/1
20 TABLET, FILM COATED ORAL AT BEDTIME
Refills: 0 | Status: DISCONTINUED | OUTPATIENT
Start: 2025-03-03 | End: 2025-03-04

## 2025-03-03 RX ORDER — ALBUMIN (HUMAN) 12.5 G/50ML
500 INJECTION, SOLUTION INTRAVENOUS ONCE
Refills: 0 | Status: COMPLETED | OUTPATIENT
Start: 2025-03-03 | End: 2025-03-03

## 2025-03-03 RX ORDER — HEPARIN SODIUM 1000 [USP'U]/ML
7000 INJECTION INTRAVENOUS; SUBCUTANEOUS EVERY 6 HOURS
Refills: 0 | Status: DISCONTINUED | OUTPATIENT
Start: 2025-03-03 | End: 2025-03-03

## 2025-03-03 RX ORDER — ACETAMINOPHEN 500 MG/5ML
650 LIQUID (ML) ORAL EVERY 6 HOURS
Refills: 0 | Status: DISCONTINUED | OUTPATIENT
Start: 2025-03-03 | End: 2025-03-04

## 2025-03-03 RX ORDER — HEPARIN SODIUM 1000 [USP'U]/ML
3500 INJECTION INTRAVENOUS; SUBCUTANEOUS EVERY 6 HOURS
Refills: 0 | Status: DISCONTINUED | OUTPATIENT
Start: 2025-03-03 | End: 2025-03-03

## 2025-03-03 RX ORDER — HYPROMELLOSE 0.4 %
1 DROPS OPHTHALMIC (EYE) EVERY 6 HOURS
Refills: 0 | Status: DISCONTINUED | OUTPATIENT
Start: 2025-03-03 | End: 2025-03-28

## 2025-03-03 RX ADMIN — Medication 15 MILLILITER(S): at 05:03

## 2025-03-03 RX ADMIN — SEVELAMER HYDROCHLORIDE 1600 MILLIGRAM(S): 800 TABLET ORAL at 22:14

## 2025-03-03 RX ADMIN — LACOSAMIDE 140 MILLIGRAM(S): 150 TABLET, FILM COATED ORAL at 18:00

## 2025-03-03 RX ADMIN — IPRATROPIUM BROMIDE AND ALBUTEROL SULFATE 3 MILLILITER(S): .5; 2.5 SOLUTION RESPIRATORY (INHALATION) at 11:01

## 2025-03-03 RX ADMIN — Medication 650 MILLIGRAM(S): at 22:15

## 2025-03-03 RX ADMIN — IPRATROPIUM BROMIDE AND ALBUTEROL SULFATE 3 MILLILITER(S): .5; 2.5 SOLUTION RESPIRATORY (INHALATION) at 17:55

## 2025-03-03 RX ADMIN — HEPARIN SODIUM 1700 UNIT(S)/HR: 1000 INJECTION INTRAVENOUS; SUBCUTANEOUS at 20:56

## 2025-03-03 RX ADMIN — GABAPENTIN 150 MILLIGRAM(S): 400 CAPSULE ORAL at 18:00

## 2025-03-03 RX ADMIN — ALBUMIN (HUMAN) 250 MILLILITER(S): 12.5 INJECTION, SOLUTION INTRAVENOUS at 08:00

## 2025-03-03 RX ADMIN — LEVETIRACETAM 750 MILLIGRAM(S): 10 INJECTION, SOLUTION INTRAVENOUS at 18:00

## 2025-03-03 RX ADMIN — LAMOTRIGINE 50 MILLIGRAM(S): 150 TABLET ORAL at 18:00

## 2025-03-03 RX ADMIN — Medication 650 MILLIGRAM(S): at 23:15

## 2025-03-03 RX ADMIN — IPRATROPIUM BROMIDE AND ALBUTEROL SULFATE 3 MILLILITER(S): .5; 2.5 SOLUTION RESPIRATORY (INHALATION) at 05:54

## 2025-03-03 RX ADMIN — DEXMEDETOMIDINE HYDROCHLORIDE IN SODIUM CHLORIDE 4.46 MICROGRAM(S)/KG/HR: 4 INJECTION INTRAVENOUS at 20:00

## 2025-03-03 RX ADMIN — GABAPENTIN 150 MILLIGRAM(S): 400 CAPSULE ORAL at 05:03

## 2025-03-03 RX ADMIN — Medication 4 MILLILITER(S): at 17:55

## 2025-03-03 RX ADMIN — IPRATROPIUM BROMIDE AND ALBUTEROL SULFATE 3 MILLILITER(S): .5; 2.5 SOLUTION RESPIRATORY (INHALATION) at 23:13

## 2025-03-03 RX ADMIN — LEVETIRACETAM 750 MILLIGRAM(S): 10 INJECTION, SOLUTION INTRAVENOUS at 05:02

## 2025-03-03 RX ADMIN — POLYETHYLENE GLYCOL 3350 17 GRAM(S): 17 POWDER, FOR SOLUTION ORAL at 11:03

## 2025-03-03 RX ADMIN — LACOSAMIDE 140 MILLIGRAM(S): 150 TABLET, FILM COATED ORAL at 05:02

## 2025-03-03 RX ADMIN — Medication 15 MILLILITER(S): at 18:00

## 2025-03-03 RX ADMIN — SEVELAMER HYDROCHLORIDE 1600 MILLIGRAM(S): 800 TABLET ORAL at 05:03

## 2025-03-03 RX ADMIN — Medication 4 MILLILITER(S): at 11:01

## 2025-03-03 RX ADMIN — SEVELAMER HYDROCHLORIDE 1600 MILLIGRAM(S): 800 TABLET ORAL at 14:58

## 2025-03-03 RX ADMIN — LURASIDONE HYDROCHLORIDE 20 MILLIGRAM(S): 120 TABLET, FILM COATED ORAL at 22:14

## 2025-03-03 RX ADMIN — ESCITALOPRAM OXALATE 15 MILLIGRAM(S): 20 TABLET ORAL at 08:00

## 2025-03-03 RX ADMIN — Medication 1000 UNIT(S): at 11:02

## 2025-03-03 RX ADMIN — Medication 4 MILLILITER(S): at 23:13

## 2025-03-03 RX ADMIN — ATORVASTATIN CALCIUM 20 MILLIGRAM(S): 80 TABLET, FILM COATED ORAL at 22:16

## 2025-03-03 RX ADMIN — Medication 4 MILLILITER(S): at 05:54

## 2025-03-03 RX ADMIN — Medication 40 MILLIGRAM(S): at 11:02

## 2025-03-03 RX ADMIN — LAMOTRIGINE 50 MILLIGRAM(S): 150 TABLET ORAL at 05:03

## 2025-03-03 RX ADMIN — NALOXEGOL OXALATE 12.5 MILLIGRAM(S): 12.5 TABLET, FILM COATED ORAL at 11:02

## 2025-03-03 NOTE — PROGRESS NOTE ADULT - ASSESSMENT
68 y/o M with PMH of HTN, HLD, VAZQUEZ on CPAP and home O2 (2LNC daytime), CKD, epilepsy, schizophrenia developmental delay, metastatic testicular CA. Recent admission at [facility] for acute respiratory failure in the setting of seizures, influenza infection, pulmonary edema, PNA requiring intubation in MICU. Was eventually discharged to rehab. Presents now with SOB, found to be COVID + at facility on 2/13. Febrile on admission to ED to 101.9F. Placed on Bipap for increased WOB. Pulmonary called to consult for acute respiratory failure.

## 2025-03-03 NOTE — PROGRESS NOTE ADULT - ASSESSMENT
69 year old male with a past medical history of hypertension, hyperlipemia VAZQUEZ (on CPAP at bedtime, NC 2 liters during the day), CKD stage 3, epilepsy, schizophrenia, developmental delay, metastatic testicular cancer (s/p orchiectomy, actively on chemotherapy, last dose of etoposide/cisplatin on 6/2024), presenting with acute on chronic hypoxemic respiratory failure, secondary to (a) COVID-19 infection, (b) superimposed pneumonia after recent influenza infection, and/or (c) fluid overload.     MICU consulted and accepted after RRT due to increased work of breathing    NEURO:  #Mental status:  - Sedated, however, was AAOx2 prior to increased work of breathing that required intubation  - prop was stopped to assess movement of left arm overnight, and has now been resumed     #Epilepsy:  - Continue with home meds which include Lacosamide, Lurasidone, Lamotrigine - lamotrigine restarted 2/24 after being held on 2/18, per pharmacy restarted at 25 BID, uptitrate over weeks, Keppra decreased to home dose    #Brain Mets:  - MRI brain now with 5.4 mm enhancing lesion in the LEFT middle cerebellar peduncle with associated edema suspicious for metastases    # RUE weakness  - Hold heparin gtt and obtain CT head    CV:  #Echo:  Recent echo last admission showing Left ventricular cavity is normal in size. Left ventricular systolic function is normal with an ejection fraction of 62 %. There are no regional wall motion abnormalities seen. Normal right ventricular cavity size and normal right ventricular systolic function.   - Repeat echo continuing to show EF 70%    #Afib w/RVR  - New onset Afib RVR (on tele, confirmed with EKG 2/19)   - Plan: Started metoprolol 5mg IVP q6 hours & Hep gtt -> metoprolol held iso hypotension  - If rate remains uncontrolled, can start Cardizem drip at 5mg/h  - c/w Heparin drip    #HTN:  - On Hydralazine 25mg TID, Amlodipine 5mg, held iso current hypotension    PULM:  #Vent   - Due to increased work of breathing, now intubated  - SBT as tolerated    #AHRF  - Patient admitted to the hospital for AHRF, found to be COVID (+)  - Likely 2/2 PNA, does not appear to CHF decompensation  - Continue with airway clearance regimen - duoneb, hypertonic saline, chest PT  - CT chest with b/l GGOs, possible early COVID fibrosis, area of consolidation ?infectious vs. atelectasis    RENAL:  #SHAGUFTA:   - Hx of CKD stage 3, Cr 2.38 at baseline, seems to be plateauing at 4.24  - In setting of COVID (+)  - Pre-renal, FeUrea 29%  - Monitor Cr  - Avoid nephrotoxic meds  - Wakefield in place  - Bumex 1 gtt  - Monitor BMP q12    GI:  No acute issues     #Diet: Tube feeds via OG tube  #Bowel Regimen  - On Senna and Miralax    ENDO:  No acute issues  Thyroid nodule noted in prior notes    HEMATOLOGIC:  #Thrombocytopenic  - Unclear origin  - Concern for possible mets to bone marrow? vs due to infection  - Continue to monitor  - Transfuse to maintain Plt>10K unless actively bleeding then maintain >50K  - Heme-Onc recs appreciated    #DVT prophylaxis   - SCD    ID:  #COVID-19  - Concern for possible superimposed PNA   - S/p zosyn 2/16- 2/28  - S/p Remdesivir  - Blood cx negative    SKIN:  #Lines:  2x PIV    Ethics:  - Full Code  - Contact: Sister Ester [phone]  69 year old male with a past medical history of hypertension, hyperlipemia VAZQUEZ (on CPAP at bedtime, NC 2 liters during the day), CKD stage 3, epilepsy, schizophrenia, developmental delay, metastatic testicular cancer (s/p orchiectomy, actively on chemotherapy, last dose of etoposide/cisplatin on 6/2024), presenting with acute on chronic hypoxemic respiratory failure, secondary to (a) COVID-19 infection, (b) superimposed pneumonia after recent influenza infection, and/or (c) fluid overload.     MICU consulted and accepted after RRT due to increased work of breathing    NEURO:  #Mental status:  - Sedated, however, was AAOx2 prior to increased work of breathing that required intubation  - Precedex - wean as tolerated    #Epilepsy:  - Continue with home meds which include Lacosamide, Lurasidone, Lamotrigine - increased to 50 BID  - EEG negative    #Brain Mets:  - MRI brain now with 5.4 mm enhancing lesion in the LEFT middle cerebellar peduncle with associated edema suspicious for metastases    # Schizophrenia  - Lurasidone halved to reduce oversedation    CV:  #Echo:  Recent echo last admission showing Left ventricular cavity is normal in size. Left ventricular systolic function is normal with an ejection fraction of 62 %. There are no regional wall motion abnormalities seen. Normal right ventricular cavity size and normal right ventricular systolic function.   - Repeat echo continuing to show EF 70%    #Afib w/RVR  - New onset Afib RVR (on tele, confirmed with EKG 2/19)   - Plan: Started metoprolol 5mg IVP q6 hours & Hep gtt -> metoprolol held iso hypotension  - If rate remains uncontrolled, can start Cardizem drip at 5mg/h  - c/w Heparin drip    #HTN:  - On Hydralazine 25mg TID, Amlodipine 5mg, held iso current hypotension    PULM:  #Vent   - Due to increased work of breathing, now intubated  - SBT as tolerated    #AHRF  - Patient admitted to the hospital for AHRF, found to be COVID (+)  - Likely 2/2 PNA, does not appear to CHF decompensation  - Continue with airway clearance regimen - duoneb, hypertonic saline, chest PT  - CT chest with b/l GGOs, possible early COVID fibrosis, area of consolidation ?infectious vs. atelectasis    RENAL:  #SHAGUFTA:   - Hx of CKD stage 3, Cr 2.38 at baseline, seems to be plateauing at 4.24  - In setting of COVID (+)  - Pre-renal, FeUrea 29%  - Monitor Cr  - Avoid nephrotoxic meds  - Wakefield in place  - Fluid challenge - albumin and blood given    GI:  No acute issues     #Diet: Tube feeds via OG tube  #Bowel Regimen  - On Senna and Miralax    ENDO:  No acute issues  Thyroid nodule noted in prior notes    HEMATOLOGIC:  #Thrombocytopenic  - Unclear origin  - Concern for possible mets to bone marrow? vs due to infection  - Continue to monitor  - Transfuse to maintain Plt>10K unless actively bleeding then maintain >50K  - Heme-Onc recs appreciated    #Anemia  - CKD vs. cancer vs. sepsis  - 1 unit PRBCs given  - CTM CBC  - No sign of bleeding    #DVT prophylaxis   - SCD    ID:  #COVID-19  - Concern for possible superimposed PNA   - S/p zosyn 2/16- 2/28  - S/p Remdesivir  - Blood cx negative    SKIN:  #Lines:  2x PIV    Ethics:  - Full Code  - Contact: Sister Ester [phone]

## 2025-03-03 NOTE — PHYSICAL THERAPY INITIAL EVALUATION ADULT - PERTINENT HX OF CURRENT PROBLEM, REHAB EVAL
69 year old male with a past medical history of hypertension, hyperlipemia VAZQUEZ (on CPAP at bedtime, NC 2 liters during the day), CKD stage 3, epilepsy, schizophrenia, developmental delay, metastatic testicular cancer (s/p orchiectomy, actively on chemotherapy, last dose of etoposide/cisplatin on 6/2024) presenting with worsening shortness of breath.     Patient was recently hospitalized at [facility] from January 27th 2025 to February 6th 2025 for seizure and influenza virus infection, which required intubation. He was sent to rehab (originally from home) from hospital. He returns due to sudden onset shortness of breath and worsening oxygenation. Of note, he tested positive for COVID-19 at facility on 2/13/25 and was not put on any COVID-19 treatment at the time, only guaifenesin and scopolamine patch. Patient was placed on non-rebreather and sent to the hospital on 2/16/25. In the ER, vital signs significant for T-max 101.9F, HR 84-90. WBC 6.8. Hemoglobin 9.1. Platelet 87. AST 70. ALT 48. Cr 3.27. BUN 45. pH 7.30, pCO2 48. UA negative for infection. Patient given vancomycin, Zosyn, albuterol, and  cc bolus. Patient admitted to the general medicine service for further care. Patient evaluated at bedside during admission. Unable to talk with the patient given that he is on BIPAP. Attempted to take the patient off BIPAP while in the room, but patient's work of breathing immediately began to increase and patient endorsed shortness of breath, so he was placed back on. History taken by chart review.     MICU consulted and accepted after RRT 2/23 due to increased work of breathing 69 year old male with a past medical history of hypertension, hyperlipemia VAZQUEZ (on CPAP at bedtime, NC 2 liters during the day), CKD stage 3, epilepsy, schizophrenia, developmental delay, metastatic testicular cancer (s/p orchiectomy, actively on chemotherapy, last dose of etoposide/cisplatin on 6/2024) presenting with worsening shortness of breath.     Patient was recently hospitalized at [facility] from January 27th 2025 to February 6th 2025 for seizure and influenza virus infection, which required intubation. He was sent to rehab (originally from home) from hospital. He returns due to sudden onset shortness of breath and worsening oxygenation. Of note, he tested positive for COVID-19 at facility on 2/13/25 and was not put on any COVID-19 treatment at the time, only guaifenesin and scopolamine patch. Patient was placed on non-rebreather and sent to the hospital on 2/16/25. In the ER, vital signs significant for T-max 101.9F, HR 84-90. WBC 6.8. Hemoglobin 9.1. Platelet 87. AST 70. ALT 48. Cr 3.27. BUN 45. pH 7.30, pCO2 48. UA negative for infection. Patient given vancomycin, Zosyn, albuterol, and  cc bolus. Patient admitted to the general medicine service for further care. Patient evaluated at bedside during admission. Unable to talk with the patient given that he is on BIPAP. Attempted to take the patient off BIPAP while in the room, but patient's work of breathing immediately began to increase and patient endorsed shortness of breath, so he was placed back on. History taken by chart review.     MICU consulted and accepted after RRT 2/23 due to increased work of breathing    CTH 3/1: No acute intracranial hemorrhage, mass effect, or midline shift. Scattered mucosal thickening. Opacification of the posterior right ethmoid air cell. Trace fluid in the sphenoid sinuses may represent trapped secretions versus sinusitis. Partial opacification of the left mastoid air cells; correlate clinically for sterile effusion versus acute otitis media. CT Chest 2/26: Since 2/17/2025, while the upper lobe groundglass opacities have improved, there are new and worsening confluent areas of consolidation/pneumonia in the mid to lower lungs. CXR 2/26: Improved aeration of the bilateral upper lobes. Bibasilar hazy opacities. LE Dopplers 2/21: No evidence of deep venous thrombosis in either lower extremity in the visualized venous segments. The calf veins were not examined.

## 2025-03-03 NOTE — PHYSICAL THERAPY INITIAL EVALUATION ADULT - STRENGTHENING, PT EVAL
Goal: Pt will increase strength >half a grade  t/o extremities to improve safety of transfers and ambulation in 4-weeks.

## 2025-03-03 NOTE — PROGRESS NOTE ADULT - PROBLEM SELECTOR PLAN 5
-By hx  -Continue Keppra.  MRI showed :  Mild periventricular and moderate deep and subcortical white   matter ischemia. 5.4 mm enhancing lesion in the LEFT middle cerebellar   peduncle with associated edema suspicious for metastases.    Marked LEFT   and mild RIGHT mucosal thickening within the BILATERAL mastoid air cells.  defer to primaryt  \eam    3/3: getting eeg today

## 2025-03-03 NOTE — PROGRESS NOTE ADULT - ASSESSMENT
69 year old male with a past medical history of hypertension, hyperlipemia VAZQUEZ (on CPAP at bedtime, NC 2 liters during the day), CKD stage 3, epilepsy, schizophrenia, developmental delay, metastatic testicular cancer (s/p orchiectomy, actively on chemotherapy, last dose of etoposide/cisplatin on 6/2024) presenting with worsening shortness of breath. Patient was recently hospitalized at [facility] from January 27th 2025 to February 6th 2025 for seizure and influenza virus infection, which required intubation. He was sent to rehab (originally from home) from hospital. He returns due to sudden onset shortness of breath and worsening oxygenation. Of note, he tested positive for COVID-19       1- SHAGUFTA on CKDIII  2- covid-19  3- anemia  4- hypotension  bp is better   5- respiratory failure   6- chf       SHAGUFTA suspected in setting of new infection. covid 19   cr and bun high with decrease uo   also with worsened anemia when seen earlier  5% Albumin 500 cc   cont vent support   hyperphosphatemia   renvela 1600 mg tid feeding tube   suspect superimposed pna tx with zosyn 3.375 g iv bid  completed  dc gabapentin in this degree of renal function deterioration   anemia, trend hgb one unit prbc   d.w pt sister re: overall decreae uo and worsened renal function and if no improvement in indices and uo may need renal replacement therapy   d.w icu team when seen earlier

## 2025-03-03 NOTE — PROGRESS NOTE ADULT - SUBJECTIVE AND OBJECTIVE BOX
SUBJECTIVE/ OVERNIGHT EVENTS:  intubated  EEG in place  1 unit PRBC overnight.  remain under MICU care.      --------------------------------------------------------------------------------------------  LABS:                        6.8    7.14  )-----------( 108      ( 03 Mar 2025 00:57 )             21.9     03-03    137  |  94[L]  |  121[H]  ----------------------------<  156[H]  4.2   |  22  |  4.34[H]    Ca    9.3      03 Mar 2025 00:01  Phos  9.9     03-03  Mg     2.9     03-03    TPro  7.3  /  Alb  2.7[L]  /  TBili  0.3  /  DBili  x   /  AST  35  /  ALT  37  /  AlkPhos  131[H]  03-03    PT/INR - ( 03 Mar 2025 00:01 )   PT: 12.0 sec;   INR: 1.05 ratio         PTT - ( 03 Mar 2025 00:01 )  PTT:75.3 sec  CAPILLARY BLOOD GLUCOSE      POCT Blood Glucose.: 188 mg/dL (03 Mar 2025 05:45)  POCT Blood Glucose.: 160 mg/dL (02 Mar 2025 17:34)  POCT Blood Glucose.: 137 mg/dL (02 Mar 2025 11:49)        Urinalysis Basic - ( 03 Mar 2025 00:01 )    Color: x / Appearance: x / SG: x / pH: x  Gluc: 156 mg/dL / Ketone: x  / Bili: x / Urobili: x   Blood: x / Protein: x / Nitrite: x   Leuk Esterase: x / RBC: x / WBC x   Sq Epi: x / Non Sq Epi: x / Bacteria: x        RADIOLOGY & ADDITIONAL TESTS:    Imaging Personally Reviewed:  [x] YES  [ ] NO    Consultant(s) Notes Reviewed:  [x] YES  [ ] NO    MEDICATIONS  (STANDING):  albuterol/ipratropium for Nebulization 3 milliLiter(s) Nebulizer every 6 hours  atorvastatin 20 milliGRAM(s) Oral at bedtime  chlorhexidine 0.12% Liquid 15 milliLiter(s) Oral Mucosa every 12 hours  cholecalciferol 1000 Unit(s) Oral daily  dexMEDEtomidine Infusion 0.2 MICROgram(s)/kG/Hr (4.46 mL/Hr) IV Continuous <Continuous>  escitalopram 15 milliGRAM(s) Oral with breakfast  gabapentin Solution 150 milliGRAM(s) Oral every 12 hours  heparin  Infusion. 1700 Unit(s)/Hr (17 mL/Hr) IV Continuous <Continuous>  influenza  Vaccine (HIGH DOSE) 0.5 milliLiter(s) IntraMuscular once  lacosamide IVPB 200 milliGRAM(s) IV Intermittent every 12 hours  lamoTRIgine 50 milliGRAM(s) Oral two times a day  levETIRAcetam   Injectable 750 milliGRAM(s) IV Push every 12 hours  lurasidone 20 milliGRAM(s) Oral at bedtime  naloxegol 12.5 milliGRAM(s) Oral daily  pantoprazole   Suspension 40 milliGRAM(s) Oral daily  polyethylene glycol 3350 17 Gram(s) Oral daily  propofol Infusion 10 MICROgram(s)/kG/Min (5.62 mL/Hr) IV Continuous <Continuous>  senna 2 Tablet(s) Oral at bedtime  sevelamer carbonate Powder 1600 milliGRAM(s) Oral every 8 hours  sodium chloride 3%  Inhalation 4 milliLiter(s) Inhalation every 6 hours    MEDICATIONS  (PRN):  heparin   Injectable 7000 Unit(s) IV Push every 6 hours PRN For aPTT less than 40  heparin   Injectable 3500 Unit(s) IV Push every 6 hours PRN For aPTT between 40 - 57      Care Discussed with Consultants/Other Providers [x] YES  [ ] NO    Vital Signs Last 24 Hrs  T(C): 36.4 (03 Mar 2025 08:00), Max: 37.1 (02 Mar 2025 20:00)  T(F): 97.5 (03 Mar 2025 08:00), Max: 98.8 (02 Mar 2025 20:00)  HR: 62 (03 Mar 2025 10:00) (59 - 97)  BP: 136/63 (03 Mar 2025 10:00) (109/57 - 165/72)  BP(mean): 91 (03 Mar 2025 10:00) (79 - 103)  RR: 18 (03 Mar 2025 10:00) (16 - 28)  SpO2: 100% (03 Mar 2025 10:00) (98% - 100%)    Parameters below as of 03 Mar 2025 09:00  Patient On (Oxygen Delivery Method): ventilator      I&O's Summary    02 Mar 2025 07:01  -  03 Mar 2025 07:00  --------------------------------------------------------  IN: 2177.7 mL / OUT: 850 mL / NET: 1327.7 mL    03 Mar 2025 07:01  -  03 Mar 2025 10:51  --------------------------------------------------------  IN: 868.9 mL / OUT: 50 mL / NET: 818.9 mL        PHYSICAL EXAM:  GENERAL: intubated, on sedation vacation.   HEAD:  Atraumatic, Normocephalic  EYES: EOMI, PERRLA, conjunctiva and sclera clear  NECK: Supple, No JVD  CHEST/LUNG: mild decrease breath sounds bilaterally; No wheeze   HEART: Regular rate and rhythm; No murmurs, rubs, or gallops  ABDOMEN: Soft, Nontender, Nondistended; Bowel sounds present  Neuro:  intubated, on sedation vacation  EXTREMITIES:  2+ Peripheral Pulses, No clubbing, cyanosis, or edema  SKIN: No rashes or lesions

## 2025-03-03 NOTE — PHYSICAL THERAPY INITIAL EVALUATION ADULT - ADDITIONAL COMMENTS
Pt intubated, sedated. Per chart review/care coordination note, pt admitted from [facility]. Prior to hospitalization/[facility], pt resided alone in an apartment with 4 stairs to enter. Performed ADLs independently and ambulated with a cane. Had 4 1/2 hours/day HHA.

## 2025-03-03 NOTE — EEG REPORT - NS EEG TEXT BOX
REPORT OF CONTINUOUS VIDEO EEG    [facility]: [address], [place] [address], Phone: [phone]  [facility]: [address] [place] [address], Phone: [phone]  [facility]: [address] [place] [address], Phone: [phone]    Patient Name: Tay Gr   Age: 69 year, : 1955  [place]: [facility] [id number]      Study Start: 3/2/2025	14:48:06 PM      Study End:  3/3/2025	08:00     Study Duration: 16 hr  59 min    -------------------------------------------------------------------------------------------------------  EEG Recording Technique:  The patient underwent continuous Video-EEG monitoring, using Telemetry System hardware on the XLTek Digital System. EEG and video data were stored on a computer hard drive with important events saved in digital archive files. The material was reviewed by a physician (electroencephalographer / epileptologist) on a daily basis. Truong and seizure detection algorithms were utilized and reviewed. An EEG Technician attended to the patient, and was available throughout daytime work hours.  The epilepsy center neurologist was available in person or on call 24-hours per day.    EEG Placement and Labeling of Electrodes:  The EEG was performed utilizing 20 channel referential EEG connections (coronal over temporal over parasagittal montage) using all standard 10-20 electrode placements with EKG, with additional electrodes placed in the inferior temporal region using the modified 10-10 montage electrode placements for elective admissions, or if deemed necessary. Recording was at a sampling rate of 256 samples per second per channel. Time synchronized digital video recording was done simultaneously with EEG recording. A low light infrared camera was used for low light recording.     -------------------------------------------------------------------------------------------------------  History: -  69 year old Male, intubated, sedated with H/O Epilepsy and developmental delay    Medication  Neurontin    Vimpat (Lacosamide)    Movantin    Keppra (Levetiracetam)    Duoneb    Diprivan (Propofol)    Lamictal (Lamotrigine)      -------------------------------------------------------------------------------------------------------  Interpretation:    [[[Abbreviation Key:  PDR=alpha rhythm/posterior dominant rhythm. A-P=anterior posterior.  Amplitude: ‘very low’:<20; ‘low’:20-49; ‘medium’:; ‘high’:>150uV.  Persistence for periodic/rhythmic patterns (% of epoch) ‘rare’:<1%; ‘occasional’:1-10%; ‘frequent’:10-50%; ‘abundant’:50-90%; ‘continuous’:>90%.  Persistence for sporadic discharges: ‘rare’:<1/hr; ‘occasional’:1/min-1/hr; ‘frequent’:>1/min; ‘abundant’:>1/10 sec.  RPP=rhythmic and periodic patterns; GRDA=generalized rhythmic delta activity; FIRDA=frontal intermittent GRDA; LRDA=lateralized rhythmic delta activity; TIRDA=temporal intermittent rhythmic delta activity;  LPD=PLED=lateralized periodic discharges; GPD=generalized periodic discharges; BIPDs =bilateral independent periodic discharges; Mf=multifocal; SIRPDs=stimulus induced rhythmic, periodic, or ictal appearing discharges; BIRDs=brief potentially ictal rhythmic discharges >4 Hz, lasting .5-10s; PFA (paroxysmal bursts >13 Hz or =8 Hz <10s).  Modifiers: +F=with fast component; +S=with spike component; +R=with rhythmic component.  S-B=burst suppression pattern.  Max=maximal. N1-drowsy; N2-stage II sleep; N3-slow wave sleep. SSS/BETS=small sharp spikes/benign epileptiform transients of sleep. HV=hyperventilation; PS=photic stimulation]]]    FINDINGS:      Background:  SYMMETRY: symmetric  CONTINUOUS: continuous  PDR: fragments of 6-7 Hz activity  REACTIVITY: present  VOLTAGE: normal, [defined typically between 20-150uV]  AP GRADIENT: absent  BREACH: absent  OTHER: none    GENERALIZED SLOWING: present. Background is diffusely slow consisting of polymorphic delta theta activity up to 6-7  Hz    FOCAL SLOWING: none was present.    State Changes:   Drowsiness was characterized by fragmentation, attenuation, and slowing of the background activity.    N2 sleep transients with symmetric spindles and poorly formed K-complexes.      Sporadic Epileptiform Discharges:   None    Rhythmic and Periodic Patterns (RPPs):  None     Electrographic and Electroclinical seizures:  None    Other Clinical Events:  None    Activation Procedures:   Hyperventilation was not performed.    Photic stimulation was not performed.      Artifacts:  Intermittent myogenic and movement artifacts were noted.    ECG:  The heart rate on single channel ECG was predominantly between 60-70 BPM.  -------------------------------------------------------------------------------------------------------  EEG Classification:    Abnormal EEG in lethargic state  1. Mild to moderate diffuse background slowing	    -------------------------------------------------------------------------------------------------------  EEG Impression / Clinical Correlate:    Abnormal prolonged EEG study due to Mild to moderate diffuse cerebral dysfunction that is not specific in etiology    No epileptic discharges recorded.  No seizures recorded.      -------------------------------------------------------------------------------------------------------  GONZALO Kapoor  Attending Physician, [facility]    ------------------------------------  EEG Reading Room: [phone]  On Call Service After Hours: [phone]

## 2025-03-03 NOTE — PROGRESS NOTE ADULT - SUBJECTIVE AND OBJECTIVE BOX
Date of Service: 03-03-25 @ 16:28    Patient is a 69y old  Male who presents with a chief complaint of Acute on chronic hypoxemic respiratory failure (03 Mar 2025 13:35)      Any change in ROS: remains intubated: lethargic on precedex at t his t aide: getting eeg;   on weaning trials too      MEDICATIONS  (STANDING):  albuterol/ipratropium for Nebulization 3 milliLiter(s) Nebulizer every 6 hours  atorvastatin 20 milliGRAM(s) Oral at bedtime  chlorhexidine 0.12% Liquid 15 milliLiter(s) Oral Mucosa every 12 hours  cholecalciferol 1000 Unit(s) Oral daily  dexMEDEtomidine Infusion 0.2 MICROgram(s)/kG/Hr (4.46 mL/Hr) IV Continuous <Continuous>  escitalopram 15 milliGRAM(s) Oral with breakfast  gabapentin Solution 150 milliGRAM(s) Oral every 12 hours  heparin  Infusion. 1700 Unit(s)/Hr (17 mL/Hr) IV Continuous <Continuous>  influenza  Vaccine (HIGH DOSE) 0.5 milliLiter(s) IntraMuscular once  lacosamide IVPB 200 milliGRAM(s) IV Intermittent every 12 hours  lamoTRIgine 50 milliGRAM(s) Oral two times a day  levETIRAcetam   Injectable 750 milliGRAM(s) IV Push every 12 hours  lurasidone 20 milliGRAM(s) Oral at bedtime  naloxegol 12.5 milliGRAM(s) Oral daily  pantoprazole   Suspension 40 milliGRAM(s) Oral daily  polyethylene glycol 3350 17 Gram(s) Oral daily  propofol Infusion 10 MICROgram(s)/kG/Min (5.62 mL/Hr) IV Continuous <Continuous>  senna 2 Tablet(s) Oral at bedtime  sevelamer carbonate Powder 1600 milliGRAM(s) Oral every 8 hours  sodium chloride 3%  Inhalation 4 milliLiter(s) Inhalation every 6 hours    MEDICATIONS  (PRN):  heparin   Injectable 7000 Unit(s) IV Push every 6 hours PRN For aPTT less than 40  heparin   Injectable 3500 Unit(s) IV Push every 6 hours PRN For aPTT between 40 - 57    Vital Signs Last 24 Hrs  T(C): 36.3 (03 Mar 2025 12:00), Max: 37.1 (02 Mar 2025 20:00)  T(F): 97.3 (03 Mar 2025 12:00), Max: 98.8 (02 Mar 2025 20:00)  HR: 66 (03 Mar 2025 14:53) (59 - 95)  BP: 144/64 (03 Mar 2025 14:00) (109/57 - 161/71)  BP(mean): 92 (03 Mar 2025 14:00) (79 - 102)  RR: 17 (03 Mar 2025 14:00) (16 - 21)  SpO2: 100% (03 Mar 2025 14:53) (98% - 100%)    Parameters below as of 03 Mar 2025 11:32  Patient On (Oxygen Delivery Method): ventilator      Mode: CPAP with PS  FiO2: 40  PEEP: 6  PS: 5  MAP: 9    I&O's Summary    02 Mar 2025 07:01  -  03 Mar 2025 07:00  --------------------------------------------------------  IN: 2177.7 mL / OUT: 850 mL / NET: 1327.7 mL    03 Mar 2025 07:01  -  03 Mar 2025 16:28  --------------------------------------------------------  IN: 1244.9 mL / OUT: 250 mL / NET: 994.9 mL          Physical Exam:   GENERAL: NAD, well-groomed, well-developed  HEENT: BREE/   Atraumatic, Normocephalic  ENMT: No tonsillar erythema, exudates, or enlargement; Moist mucous membranes, Good dentition, No lesions  NECK: Supple, No JVD, Normal thyroid  CHEST/LUNG: Clear to auscultaion  CVS: Regular rate and rhythm; No murmurs, rubs, or gallops  GI: : Soft, Nontender, Nondistended; Bowel sounds present  NERVOUS SYSTEM:  lethargic on precedex  EXTREMITIES: - edema  LYMPH: No lymphadenopathy noted  SKIN: No rashes or lesions  ENDOCRINOLOGY: No Thyromegaly  PSYCH: letahrgic    Labs:  ABG - ( 02 Mar 2025 03:25 )  pH, Arterial: 7.39  pH, Blood: x     /  pCO2: 40    /  pO2: 107   / HCO3: 24    / Base Excess: -0.7  /  SaO2: 99.4            40.0                            7.4    7.06  )-----------( 115      ( 03 Mar 2025 12:41 )             22.5                         6.8    7.14  )-----------( 108      ( 03 Mar 2025 00:57 )             21.9                         5.5    8.02  )-----------( 117      ( 03 Mar 2025 00:01 )             18.0                         7.0    9.10  )-----------( 112      ( 02 Mar 2025 12:01 )             22.4                         7.7    9.10  )-----------( 116      ( 02 Mar 2025 03:49 )             24.3                         7.4    7.36  )-----------( 101      ( 01 Mar 2025 00:50 )             23.3                         7.5    8.31  )-----------( 104      ( 28 Feb 2025 00:29 )             23.9     03-03    137  |  94[L]  |  121[H]  ----------------------------<  156[H]  4.2   |  22  |  4.34[H]  03-02    139  |  95[L]  |  115[H]  ----------------------------<  145[H]  3.7   |  22  |  4.06[H]  03-02    139  |  95[L]  |  117[H]  ----------------------------<  165[H]  3.4[L]   |  22  |  4.11[H]  03-01    139  |  94[L]  |  104[H]  ----------------------------<  137[H]  3.6   |  22  |  4.28[H]  03-01    139  |  94[L]  |  100[H]  ----------------------------<  158[H]  3.7   |  21[L]  |  4.24[H]  02-28    137  |  92[L]  |  104[H]  ----------------------------<  176[H]  3.6   |  21[L]  |  3.89[H]  02-28    138  |  95[L]  |  98[H]  ----------------------------<  127[H]  3.5   |  21[L]  |  4.01[H]    Ca    9.3      03 Mar 2025 00:01  Ca    9.3      02 Mar 2025 12:01  Ca    9.4      02 Mar 2025 03:48  Phos  9.9     03-03  Phos  8.9     03-02  Phos  9.1     03-02  Mg     2.9     03-03  Mg     2.7     03-02  Mg     2.6     03-02    TPro  7.3  /  Alb  2.7[L]  /  TBili  0.3  /  DBili  x   /  AST  35  /  ALT  37  /  AlkPhos  131[H]  03-03  TPro  7.5  /  Alb  2.9[L]  /  TBili  0.3  /  DBili  x   /  AST  45[H]  /  ALT  47[H]  /  AlkPhos  152[H]  03-02  TPro  7.4  /  Alb  2.8[L]  /  TBili  0.3  /  DBili  x   /  AST  37  /  ALT  47[H]  /  AlkPhos  148[H]  03-01  TPro  7.3  /  Alb  2.9[L]  /  TBili  0.3  /  DBili  x   /  AST  50[H]  /  ALT  54[H]  /  AlkPhos  166[H]  02-28    CAPILLARY BLOOD GLUCOSE      POCT Blood Glucose.: 126 mg/dL (03 Mar 2025 12:30)  POCT Blood Glucose.: 188 mg/dL (03 Mar 2025 05:45)  POCT Blood Glucose.: 160 mg/dL (02 Mar 2025 17:34)      LIVER FUNCTIONS - ( 03 Mar 2025 00:01 )  Alb: 2.7 g/dL / Pro: 7.3 g/dL / ALK PHOS: 131 U/L / ALT: 37 U/L / AST: 35 U/L / GGT: x           PT/INR - ( 03 Mar 2025 00:01 )   PT: 12.0 sec;   INR: 1.05 ratio         PTT - ( 03 Mar 2025 00:01 )  PTT:75.3 sec  Urinalysis Basic - ( 03 Mar 2025 00:01 )    Color: x / Appearance: x / SG: x / pH: x  Gluc: 156 mg/dL / Ketone: x  / Bili: x / Urobili: x   Blood: x / Protein: x / Nitrite: x   Leuk Esterase: x / RBC: x / WBC x   Sq Epi: x / Non Sq Epi: x / Bacteria: x        rad< from: CT Head No Cont (03.01.25 @ 16:16) >  No acute intracranial hemorrhage, mass effect, or midline shift.    Scattered mucosal thickening.  Opacification of the posterior right   ethmoid air cell.  Trace fluid in the sphenoid sinuses may represent   trapped secretions versus sinusitis.    Partial opacification of the left mastoid air cells; correlate clinically   for sterile effusion versus acute otitis media.        --- End of Report ---    < end of copied text >  < from: Xray Chest 1 View- PORTABLE-Urgent (Xray Chest 1 View- PORTABLE-Urgent .) (02.26.25 @ 11:26) >  The cardiac silhouette is not well evaluated on AP film.  Bibasilar hazy opacities. Improved aeration of the bilateral upper lobes.  Small left pleural effusion.  There is no pneumothorax or right pleural effusion.  No acute osseous abnormalities    IMPRESSION:  Improved aeration of the bilateral upper lobes.  Bibasilar hazy opacities.    --- End of Report ---          SHLOMO BLAKE MD; Resident Radiologist  This document has been electronically signed.  STEPHANIE HARMON MD; Attending Radiologist  This document has been electronically signed. Feb 26 2025 12:32PM    < end of copied text >      RECENT CULTURES:        RESPIRATORY CULTURES:          Studies  Chest X-RAY  CT SCAN Chest   Venous Dopplers: LE:   CT Abdomen  Others

## 2025-03-03 NOTE — PROGRESS NOTE ADULT - PROBLEM SELECTOR PLAN 6
Unclear cause. Also with anemia. May be related to metastatic cancer that may have gone to bone marrow. Not present in the past vs. viral covid19  - Monitor for improvement daily  - Appreciate heme/onc  - brain lesion on prior MRI?  - heme/onc following.  - 1 unit prbc 3/2/25, monitor hgb.

## 2025-03-03 NOTE — PROGRESS NOTE ADULT - PROBLEM SELECTOR PLAN 1
-Recent admission for acute respiratory failure in the setting of grand mal seizures, influenza, PNA. S/p intubation in MICU, was extubated to AVAPS  -Now COVID +  -CXR with low lung volumes, mild congestion. Possible underlying infiltrate  -CT chest with b/l GGO, new since 1/30/25. Likely COVID PNA +/- superimposed bacterial PNA.   -Started on HFNC 2/18  -S/p multiple RRTs for hypoxia, increased WOB. Intubated s/p RRT on 2/23 & transferred to MICU  -Continue bronchodilators  -ABX per ID/MICU  -Pressure support trials as tolerated  -Keep sats >90%.  2/26: remains intubated:  on 40% fio2:  per MICU :  cont antibiotics:  cxr with Improvement in upper lobes  2/27: seems to be doing same:  remains intubated and sedated : on antibiotics and pressors:  cont current rx  3/1: no change in status:  remains the same:  intubated and sedated  3/2: remains intubated:  but on cpap trials now:  off sedation:  cont current weaning trials  3/3: getting eeg today  : remains lethargic:  on precedex at this ti me: off antibiotics: id following : cont weaning trials: No

## 2025-03-03 NOTE — PROGRESS NOTE ADULT - SUBJECTIVE AND OBJECTIVE BOX
*******************************  Navya Michelle PGY-3  *******************************    INTERVAL HPI/OVERNIGHT EVENTS:     SUBJECTIVE: Patient seen and examined at bedside.     OBJECTIVE:    VITAL SIGNS:  ICU Vital Signs Last 24 Hrs  T(C): 36.4 (03 Mar 2025 08:00), Max: 37.1 (02 Mar 2025 20:00)  T(F): 97.5 (03 Mar 2025 08:00), Max: 98.8 (02 Mar 2025 20:00)  HR: 62 (03 Mar 2025 11:32) (59 - 97)  BP: 141/63 (03 Mar 2025 11:00) (109/57 - 165/72)  BP(mean): 91 (03 Mar 2025 11:00) (79 - 103)  ABP: --  ABP(mean): --  RR: 19 (03 Mar 2025 11:00) (16 - 25)  SpO2: 100% (03 Mar 2025 11:32) (98% - 100%)    O2 Parameters below as of 03 Mar 2025 11:32  Patient On (Oxygen Delivery Method): ventilator          Mode: CPAP with PS, FiO2: 40, PEEP: 6, PS: 5, MAP: 6    03-02 @ 07:01 - 03-03 @ 07:00  --------------------------------------------------------  IN: 2177.7 mL / OUT: 850 mL / NET: 1327.7 mL    03-03 @ 07:01  -  03-03 @ 13:35  --------------------------------------------------------  IN: 955.4 mL / OUT: 110 mL / NET: 845.4 mL      CAPILLARY BLOOD GLUCOSE      POCT Blood Glucose.: 126 mg/dL (03 Mar 2025 12:30)        PHYSICAL EXAM:  GENERAL: NAD, well-developed  HEAD:  Atraumatic, Normocephalic  EYES: EOMI, PERRLA, conjunctiva and sclera clear  NECK: Supple, No JVD  CHEST/LUNG: Clear to auscultation bilaterally; No wheeze  HEART: Regular rate and rhythm; No murmurs, rubs, or gallops  ABDOMEN: Soft, Nontender, Nondistended; Bowel sounds present  EXTREMITIES:  2+ Peripheral Pulses, No clubbing, cyanosis, or edema  PSYCH: AAOx3  NEUROLOGY: non-focal  SKIN: No rashes or lesions  Lines:      MEDICATIONS:  MEDICATIONS  (STANDING):  albuterol/ipratropium for Nebulization 3 milliLiter(s) Nebulizer every 6 hours  atorvastatin 20 milliGRAM(s) Oral at bedtime  chlorhexidine 0.12% Liquid 15 milliLiter(s) Oral Mucosa every 12 hours  cholecalciferol 1000 Unit(s) Oral daily  dexMEDEtomidine Infusion 0.2 MICROgram(s)/kG/Hr (4.46 mL/Hr) IV Continuous <Continuous>  escitalopram 15 milliGRAM(s) Oral with breakfast  gabapentin Solution 150 milliGRAM(s) Oral every 12 hours  heparin  Infusion. 1700 Unit(s)/Hr (17 mL/Hr) IV Continuous <Continuous>  influenza  Vaccine (HIGH DOSE) 0.5 milliLiter(s) IntraMuscular once  lacosamide IVPB 200 milliGRAM(s) IV Intermittent every 12 hours  lamoTRIgine 50 milliGRAM(s) Oral two times a day  levETIRAcetam   Injectable 750 milliGRAM(s) IV Push every 12 hours  lurasidone 20 milliGRAM(s) Oral at bedtime  naloxegol 12.5 milliGRAM(s) Oral daily  pantoprazole   Suspension 40 milliGRAM(s) Oral daily  polyethylene glycol 3350 17 Gram(s) Oral daily  propofol Infusion 10 MICROgram(s)/kG/Min (5.62 mL/Hr) IV Continuous <Continuous>  senna 2 Tablet(s) Oral at bedtime  sevelamer carbonate Powder 1600 milliGRAM(s) Oral every 8 hours  sodium chloride 3%  Inhalation 4 milliLiter(s) Inhalation every 6 hours    MEDICATIONS  (PRN):  heparin   Injectable 7000 Unit(s) IV Push every 6 hours PRN For aPTT less than 40  heparin   Injectable 3500 Unit(s) IV Push every 6 hours PRN For aPTT between 40 - 57      ALLERGIES:  Allergies    seasonal allergies (Unknown)  penicillin (Hives)    Intolerances    latex (Rash)      LABS:                        7.4    7.06  )-----------( 115      ( 03 Mar 2025 12:41 )             22.5     03-03    137  |  94[L]  |  121[H]  ----------------------------<  156[H]  4.2   |  22  |  4.34[H]    Ca    9.3      03 Mar 2025 00:01  Phos  9.9     03-03  Mg     2.9     03-03    TPro  7.3  /  Alb  2.7[L]  /  TBili  0.3  /  DBili  x   /  AST  35  /  ALT  37  /  AlkPhos  131[H]  03-03    PT/INR - ( 03 Mar 2025 00:01 )   PT: 12.0 sec;   INR: 1.05 ratio         PTT - ( 03 Mar 2025 00:01 )  PTT:75.3 sec  Urinalysis Basic - ( 03 Mar 2025 00:01 )    Color: x / Appearance: x / SG: x / pH: x  Gluc: 156 mg/dL / Ketone: x  / Bili: x / Urobili: x   Blood: x / Protein: x / Nitrite: x   Leuk Esterase: x / RBC: x / WBC x   Sq Epi: x / Non Sq Epi: x / Bacteria: x        RADIOLOGY & ADDITIONAL TESTS: Reviewed.     *******************************  Navya Michelle PGY-3  *******************************    INTERVAL HPI/OVERNIGHT EVENTS: Received 1 unit PRBCs for hgb 6.8.     SUBJECTIVE: Patient seen and examined at bedside. Opens eyes but otherwise does not interact    OBJECTIVE:    VITAL SIGNS:  ICU Vital Signs Last 24 Hrs  T(C): 36.4 (03 Mar 2025 08:00), Max: 37.1 (02 Mar 2025 20:00)  T(F): 97.5 (03 Mar 2025 08:00), Max: 98.8 (02 Mar 2025 20:00)  HR: 62 (03 Mar 2025 11:32) (59 - 97)  BP: 141/63 (03 Mar 2025 11:00) (109/57 - 165/72)  BP(mean): 91 (03 Mar 2025 11:00) (79 - 103)  ABP: --  ABP(mean): --  RR: 19 (03 Mar 2025 11:00) (16 - 25)  SpO2: 100% (03 Mar 2025 11:32) (98% - 100%)    O2 Parameters below as of 03 Mar 2025 11:32  Patient On (Oxygen Delivery Method): ventilator          Mode: CPAP with PS, FiO2: 40, PEEP: 6, PS: 5, MAP: 6    03-02 @ 07:01 - 03-03 @ 07:00  --------------------------------------------------------  IN: 2177.7 mL / OUT: 850 mL / NET: 1327.7 mL    03-03 @ 07:01 - 03-03 @ 13:35  --------------------------------------------------------  IN: 955.4 mL / OUT: 110 mL / NET: 845.4 mL      CAPILLARY BLOOD GLUCOSE      POCT Blood Glucose.: 126 mg/dL (03 Mar 2025 12:30)        PHYSICAL EXAM:  GENERAL: Intubated, sedated but tracking  HEENT: NCAT  NECK: supple without JVD  CHEST/LUNG: mechanical breath sounds bilaterally  CARDIOVASCULAR: regular rate and rhythm, normal S1 and S2, no murmur/rub/gallop  ABDOMEN: positive bowel sounds, non-distended, no organomegaly   EXTREMITIES: no lower extremity edema, b/l upper extremities edematous, though improved in appearance  NEUROLOGY: sedated        MEDICATIONS:  MEDICATIONS  (STANDING):  albuterol/ipratropium for Nebulization 3 milliLiter(s) Nebulizer every 6 hours  atorvastatin 20 milliGRAM(s) Oral at bedtime  chlorhexidine 0.12% Liquid 15 milliLiter(s) Oral Mucosa every 12 hours  cholecalciferol 1000 Unit(s) Oral daily  dexMEDEtomidine Infusion 0.2 MICROgram(s)/kG/Hr (4.46 mL/Hr) IV Continuous <Continuous>  escitalopram 15 milliGRAM(s) Oral with breakfast  gabapentin Solution 150 milliGRAM(s) Oral every 12 hours  heparin  Infusion. 1700 Unit(s)/Hr (17 mL/Hr) IV Continuous <Continuous>  influenza  Vaccine (HIGH DOSE) 0.5 milliLiter(s) IntraMuscular once  lacosamide IVPB 200 milliGRAM(s) IV Intermittent every 12 hours  lamoTRIgine 50 milliGRAM(s) Oral two times a day  levETIRAcetam   Injectable 750 milliGRAM(s) IV Push every 12 hours  lurasidone 20 milliGRAM(s) Oral at bedtime  naloxegol 12.5 milliGRAM(s) Oral daily  pantoprazole   Suspension 40 milliGRAM(s) Oral daily  polyethylene glycol 3350 17 Gram(s) Oral daily  propofol Infusion 10 MICROgram(s)/kG/Min (5.62 mL/Hr) IV Continuous <Continuous>  senna 2 Tablet(s) Oral at bedtime  sevelamer carbonate Powder 1600 milliGRAM(s) Oral every 8 hours  sodium chloride 3%  Inhalation 4 milliLiter(s) Inhalation every 6 hours    MEDICATIONS  (PRN):  heparin   Injectable 7000 Unit(s) IV Push every 6 hours PRN For aPTT less than 40  heparin   Injectable 3500 Unit(s) IV Push every 6 hours PRN For aPTT between 40 - 57      ALLERGIES:  Allergies    seasonal allergies (Unknown)  penicillin (Hives)    Intolerances    latex (Rash)      LABS:                        7.4    7.06  )-----------( 115      ( 03 Mar 2025 12:41 )             22.5     03-03    137  |  94[L]  |  121[H]  ----------------------------<  156[H]  4.2   |  22  |  4.34[H]    Ca    9.3      03 Mar 2025 00:01  Phos  9.9     03-03  Mg     2.9     03-03    TPro  7.3  /  Alb  2.7[L]  /  TBili  0.3  /  DBili  x   /  AST  35  /  ALT  37  /  AlkPhos  131[H]  03-03    PT/INR - ( 03 Mar 2025 00:01 )   PT: 12.0 sec;   INR: 1.05 ratio         PTT - ( 03 Mar 2025 00:01 )  PTT:75.3 sec  Urinalysis Basic - ( 03 Mar 2025 00:01 )    Color: x / Appearance: x / SG: x / pH: x  Gluc: 156 mg/dL / Ketone: x  / Bili: x / Urobili: x   Blood: x / Protein: x / Nitrite: x   Leuk Esterase: x / RBC: x / WBC x   Sq Epi: x / Non Sq Epi: x / Bacteria: x        RADIOLOGY & ADDITIONAL TESTS: Reviewed.

## 2025-03-03 NOTE — PROGRESS NOTE ADULT - PROBLEM SELECTOR PLAN 3
-CT chest with b/l GGO, new since 1/30/25. Likely COVID PNA +/- superimposed bacterial PNA  -Continue ABX.  2/27 : on antibiotics:  2/28: remains intubated and sedated  on vasopressors:  for septic shock  3/1: as above  3/2: off antibiotics now:  3/3; being monitored off antibiotics;

## 2025-03-03 NOTE — CHART NOTE - NSCHARTNOTEFT_GEN_A_CORE
NUTRITION FOLLOW UP NOTE    PATIENT SEEN FOR: follow up     SOURCE: [] Patient  [x] Current Medical Record  [x] Nursing  [] Family/support person at bedside  [x] Patient unavailable/inappropriate  [] Other:    CHART REVIEWED/EVENTS NOTED.  [] No changes to nutrition care plan to note  [x] Nutrition Status:  -on sedation vacation    DIET ORDER:   Diet, NPO with Tube Feed:   Tube Feeding Modality: Orogastric  Vital AF (VITALAFRTH)  Total Volume for 24 Hours (mL): 1170  Continuous  Starting Tube Feed Rate {mL per Hour}: 10  Increase Tube Feed Rate by (mL): 10     Every 4 hours  Until Goal Tube Feed Rate (mL per Hour): 65  Tube Feed Duration (in Hours): 18  Tube Feed Start Time: 11:00 (25)    CURRENT DIET ORDER IS:  [] Appropriate:  [] Inadequate:  [x] Other: see below for recommendations     NUTRITION INTAKE/PROVISION:  [] PO:  [x] Enteral Nutrition: providing at 100% provision 1404kcal (16kcal/kg) and 88g protein (1.4g/kg)  [] Parenteral Nutrition:    ANTHROPOMETRICS:  Drug Dosing Weight  Height (cm): 167.6 (01 Mar 2025 20:00)  Weight (kg): 89.1 (01 Mar 2025 20:00)  BMI (kg/m2): 31.7 (01 Mar 2025 20:00)  BSA (m2): 1.98 (01 Mar 2025 20:00)  Weights:   Daily Weight in k.8 (03-03)     MEDICATIONS:  MEDICATIONS  (STANDING):  albuterol/ipratropium for Nebulization 3 milliLiter(s) Nebulizer every 6 hours  atorvastatin 20 milliGRAM(s) Oral at bedtime  chlorhexidine 0.12% Liquid 15 milliLiter(s) Oral Mucosa every 12 hours  cholecalciferol 1000 Unit(s) Oral daily  dexMEDEtomidine Infusion 0.2 MICROgram(s)/kG/Hr (4.46 mL/Hr) IV Continuous <Continuous>  escitalopram 15 milliGRAM(s) Oral with breakfast  gabapentin Solution 150 milliGRAM(s) Oral every 12 hours  heparin  Infusion. 1700 Unit(s)/Hr (17 mL/Hr) IV Continuous <Continuous>  influenza  Vaccine (HIGH DOSE) 0.5 milliLiter(s) IntraMuscular once  lacosamide IVPB 200 milliGRAM(s) IV Intermittent every 12 hours  lamoTRIgine 50 milliGRAM(s) Oral two times a day  levETIRAcetam   Injectable 750 milliGRAM(s) IV Push every 12 hours  lurasidone 20 milliGRAM(s) Oral at bedtime  naloxegol 12.5 milliGRAM(s) Oral daily  pantoprazole   Suspension 40 milliGRAM(s) Oral daily  polyethylene glycol 3350 17 Gram(s) Oral daily  propofol Infusion 10 MICROgram(s)/kG/Min (5.62 mL/Hr) IV Continuous <Continuous>  senna 2 Tablet(s) Oral at bedtime  sevelamer carbonate Powder 1600 milliGRAM(s) Oral every 8 hours  sodium chloride 3%  Inhalation 4 milliLiter(s) Inhalation every 6 hours    MEDICATIONS  (PRN):  heparin   Injectable 7000 Unit(s) IV Push every 6 hours PRN For aPTT less than 40  heparin   Injectable 3500 Unit(s) IV Push every 6 hours PRN For aPTT between 40 - 57    NUTRITIONALLY PERTINENT LABS:   Na137 mmol/L Glu 156 mg/dL[H] K+ 4.2 mmol/L Cr  4.34 mg/dL[H]  mg/dL[H]  Phos 9.9 mg/dL[H]  Alb 2.7 g/dL[L] ALT 37 U/L AST 35 U/L Alkaline Phosphatase 131 U/L[H]    Finger Sticks:  POCT Blood Glucose.: 188 mg/dL ( @ 05:45)  POCT Blood Glucose.: 160 mg/dL ( @ 17:34)  POCT Blood Glucose.: 137 mg/dL ( @ 11:49)    NUTRITIONALLY PERTINENT MEDICATIONS/LABS:  [x] Reviewed  [x] Relevant notes on medications/labs:   -magnesium and phosphorus elevated. sevelamer ordered   -propofol now 0ml/hr     EDEMA:  [x] Reviewed  [] Relevant notes:    GI/ I&O:  [x] Reviewed  [x] Relevant notes: lose stool noted per flow sheets  [] Other:    SKIN:   [x] No pressure injuries documented, per nursing flowsheet  [] Pressure injury previously noted  [] Change in pressure injury documentation:  [] Other:    ESTIMATED NEEDS:  Based on: dosing weight 89.1kg for calorie needs and IBW 64.4kg for protein needs  18-23kcal/kg 1603-2049kcal/day  1.2-1.5g/kg 77-97g protein/day  defer fluid needs to team  [employer] [place]: 1645kcal/day    NUTRITION DIAGNOSIS:  [x] Prior Dx:  Severe Acute Malnutrition  [] New Dx:    EDUCATION:  [] Yes:  [x] Not appropriate/warranted    NUTRITION CARE PLAN:  1. Diet: If pt remains with elevated magnesium and phosphorus, consider Nepro tube feeding. 55ml/hr x 18 hours to provide a total volume of 990ml, 1752kcal (20kcal/kg) and 80g protein (1.2g/kg).  -->Defer free water flush to team    [] Achieved - Continue current nutrition intervention(s)  [] Current medical condition precludes nutrition intervention at this time.    MONITORING AND EVALUATION:   RD remains available upon request and will follow up per protocol.    Ansley Rodriguez, MS, RD, CDN / Teams

## 2025-03-03 NOTE — PHYSICAL THERAPY INITIAL EVALUATION ADULT - LEVEL OF CONSCIOUSNESS, REHAB EVAL
lethargic/somnolent/confused/sedated (3/4): Following 100% of simple commands./lethargic/somnolent/confused/sedated

## 2025-03-03 NOTE — PROGRESS NOTE ADULT - ATTENDING COMMENTS
1. Acute  hypoxemic  respiratory failure in setting of Covid with new superimposed  pneumonia. Likely aspiration pneumonia.. Pt finished course of treatment  for covid and pneumonia. Currently  remains on mechanical ventilation. Tolerating PS 5 wean today. Continue weaning as tolerated.  Then back to AC vent settings.    2. Renal. Acute renal failure , Pre renal . Over diuresis. Pt receiving 1 unit PRBC and 500mls 5% albumin. Monitor UO and creatinine.     3. Neuro. starting to wake up. Opens eyes to name. Decrease Precedex as tolerated. EEG negative. Stop EEG.'  Continue current AEDS regimen.? decrease some sedating AEDS. 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 Stable.    4.Cardiac. Chronic atrial fib. Continue  IV heparin Rate  IV lopressor as needed.  Restart metoprolol as bp tolerates.    5..DVT prophylaxis: IV heparin    6. GOC; Full code. 1. Acute  hypoxemic  respiratory failure in setting of Covid with new superimposed  pneumonia. Likely aspiration pneumonia.. Pt finished course of treatment  for covid and pneumonia. Currently  remains on mechanical ventilation. Tolerating PS 5 wean today. Continue weaning as tolerated.  Then back to AC vent settings.    2. Renal. Acute renal failure , Pre renal . Over diuresis. Pt receiving 1 unit PRBC and 500mls 5% albumin. Monitor UO and creatinine.     3. Neuro. starting to wake up. Opens eyes to name. Decrease Precedex as tolerated. EEG negative. Stop EEG.'  Continue current AEDS regimen.? decrease some sedating AEDS. 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 Stable.    4.Cardiac. Chronic atrial fib. Continue  IV heparin Rate  IV lopressor as needed.  Restart metoprolol as bp tolerates.    5..DVT prophylaxis: IV heparin    6. GOC; Full code.    7. Psych. Schizophrenia: Continue  current psych  regimen

## 2025-03-03 NOTE — PROGRESS NOTE ADULT - ASSESSMENT
Mr. Gr is a 69 year old male with PMHx of seizure disorder, sleep apnea, HTN, chronic idiopathic constipation, stage III CKD, severe obesity, secondary hyperparathyroidism, anemia, thrombocytopenia, hyperlipidemia, testicular cancer under treatment with Dr. Ovalles who is admitted for acute hypoxic respiratory failure secondary to COVID vs superimposed pneumonia with new onset thrombocytopenia. He was recently discharged 02/06/2025 after a tenous stay including intubation in the ICU for respiratory failure in setting of seizure. He had recovered and was discharged to rehab.      Former smoker, quit 14y prior, denied ETOH, drug use. Lives at home. Does not have children. Denies family history of blood disorders or malignancy.  Denies previous workplace related exposures.  Denies previous history of blood transfusions.  Denied history of thromboses.  Denied history of  requiring anticoagulation.     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  Margins were negative.  pT3Nx. CT C/A/P with few left para-aortic and left iliac chain lymph nodes largest measuring 8.1 cm left para-aortic node, bilateral subcentimeter noncalcified pulmonary nodules measuring up to 7 mm. AFP, LDH, hCG were all elevated. 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 Started on adjuvant therapy with Cisplatin+Etoposide, so far completed 4 cycles of treatment with cisplatin dose reduced 50% and Etoposide 50% due to thrombocytopenia.      02/19/2025: on HFNC, noted tachycardia and fever, Klebsiella in urine as well, thrombocytopenia stable/improving, no signs of active bleeding     02/20/2025: developed A.fib with RVR and was placed on heparin drip and pending cardiology eval    02/21/2025: awake, on AVAPS overnight, noted RRT for hypoxia as pt removed HFNC at some point in time, good response thereafter     02/22/2025:  continues on AVAPS this morning, noted RRT overnight for hypoxia, hypercapnia, ICU following, US LE negative for DVT, counts overall stable/improved     02/23/2025: progressive respiratory failure, noted ongoing RTT this morning with pending intubation and transfer to MICU     02/24/2025: intubated 02/23/2025, sedated, on pressor support, no signs of bleeding, counts noted, no signs of bleeding     02/25/2025: continues in MICU, intubated and sedated, no signs of bleeding    02/26/2025: continues in MICU, intubated, without signs of bleeding, continues on heparin drip     02/27/2025:  remains under the care of the ICU, intubated, no signs of bleeding and continues on heparin drip, counts stable, has not required PRBC support this admission    02/28/2025: continues under the care of MICU, continues intubated, no signs of bleeding, on heparin drip    03/01/2025: continues in MICU, intubated, direct josefina IgG positive, eluate negative, not likely to be clinically significant     03/02/2025:  continues in MICU, nursing staff at bedside, no overnight events noted. CTH without acute intracranial pathology         #Acute hypoxic respiratory failure  # COVID  - CT noted with bilateral GGO  - ID following  - Pulmonary following  - Antibiotics per primary team/MICU and ID   - Intubated 02/23/2025 AM, MICU     # Thrombocytopenia   - Did occur during most recent admission and improved to normal prior to discharge   - Without signs of bleeding  - Could be multifactorial, possibly due to infection   - Continue to trend  - Transfuse to maintain Plt > 10k unless actively bleeding then maintain > 50k     # Brain lesion  - pt with hx of seizures  - MRI brain now with 5.4 mm enhancing lesion in the LEFT middle cerebellar peduncle with associated edema suspicious for metastases  - MRI Lumbar negative for acute disease  - Small lesion without mass effect or edema, recommended to correlate per neurology if foci and locus are concordant with seizure activity  - Neurology recs noted, new lesion not likely to cause seizure  - It is rare, but nonetheless not impossible, for testicular cancer to be metastatic to the brain  - He will need another PET-CT, can be completed outpatient once stable if concern can proceed with biopsy at that time   - Previous endocrinology eval for thyroid nodule noted  - AFP/BHCH normal,  previously   - Repeat CTH without acute intracranial pathology       # 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 Ovalles, agree with current plan  - Follow up as outpatient with Franki Ovalles MD     # Acute kidney injury on CKD Stage IIIA  - Likely multifactorial  - Nephrology consult and recs noted  - Continue to trend     # Normocytic anemia  - Chronic, has hx of PRBC during chemo 07/2024  - Noted Anti E, Anti-K Anti-C antibodies previously  - direct josefina IgG positive, eluate negative, not likely to be clinically significant   - Monitor for bleeding  - Recommend to transfuse to maintain Hb > 7    # Klebsiella UTI  -Antibiotics per ID and primary team       Recommendations  - Trend with CBC daily   - Transfuse to maintain Hb > 7   - Transfuse to maintain Plt >10k unless active bleeding then >50k   - Monitor renal function  - Antibiotics per primary team/MICU and ID   - Cardiology follow up to address anticoagulation, currently on heparin drip   - f/u ongoing ICU recs         Thank you for allowing me to participate in the care of Mr. Gr please do not hesitate to call or text me if you have further questions or concerns.     Brayan Mart MD  [facility] [place]   Division of Hematology/Oncology  [facility], [address]  [place] [address]  P: [phone]  F: [phone]    Attestation:    ----Pt evaluated including face-to-face interaction in addition to chart review, reviewing treatment plan, and managing the patient’s chronic diagnoses as listed in the assessment----        Mr. Gr is a 69 year old male with PMHx of seizure disorder, sleep apnea, HTN, chronic idiopathic constipation, stage III CKD, severe obesity, secondary hyperparathyroidism, anemia, thrombocytopenia, hyperlipidemia, testicular cancer under treatment with Dr. Ovalles who is admitted for acute hypoxic respiratory failure secondary to COVID vs superimposed pneumonia with new onset thrombocytopenia. He was recently discharged 02/06/2025 after a tenous stay including intubation in the ICU for respiratory failure in setting of seizure. He had recovered and was discharged to rehab.      Former smoker, quit 14y prior, denied ETOH, drug use. Lives at home. Does not have children. Denies family history of blood disorders or malignancy.  Denies previous workplace related exposures.  Denies previous history of blood transfusions.  Denied history of thromboses.  Denied history of  requiring anticoagulation.     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  Margins were negative.  pT3Nx. CT C/A/P with few left para-aortic and left iliac chain lymph nodes largest measuring 8.1 cm left para-aortic node, bilateral subcentimeter noncalcified pulmonary nodules measuring up to 7 mm. AFP, LDH, hCG were all elevated. 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 Started on adjuvant therapy with Cisplatin+Etoposide, so far completed 4 cycles of treatment with cisplatin dose reduced 50% and Etoposide 50% due to thrombocytopenia.      02/19/2025: on HFNC, noted tachycardia and fever, Klebsiella in urine as well, thrombocytopenia stable/improving, no signs of active bleeding     02/20/2025: developed A.fib with RVR and was placed on heparin drip and pending cardiology eval    02/21/2025: awake, on AVAPS overnight, noted RRT for hypoxia as pt removed HFNC at some point in time, good response thereafter     02/22/2025:  continues on AVAPS this morning, noted RRT overnight for hypoxia, hypercapnia, ICU following, US LE negative for DVT, counts overall stable/improved     02/23/2025: progressive respiratory failure, noted ongoing RTT this morning with pending intubation and transfer to MICU     02/24/2025: intubated 02/23/2025, sedated, on pressor support, no signs of bleeding, counts noted, no signs of bleeding     02/25/2025: continues in MICU, intubated and sedated, no signs of bleeding    02/26/2025: continues in MICU, intubated, without signs of bleeding, continues on heparin drip     02/27/2025:  remains under the care of the ICU, intubated, no signs of bleeding and continues on heparin drip, counts stable, has not required PRBC support this admission    02/28/2025: continues under the care of MICU, continues intubated, no signs of bleeding, on heparin drip    03/01/2025: continues in MICU, intubated, direct josefina IgG positive, eluate negative, not likely to be clinically significant     03/02/2025:  continues in MICU, nursing staff at bedside, no overnight events noted. CTH without acute intracranial pathology     03/03/2025: continues in MICU, intubated, on heparin drip, 1u PRBC overnight, no signs of bleeding, platelet count stable         #Acute hypoxic respiratory failure  # COVID  - CT noted with bilateral GGO  - ID following  - Pulmonary following  - Antibiotics per primary team/MICU and ID   - Intubated 02/23/2025 AM, MICU     # Thrombocytopenia   - Did occur during most recent admission and improved to normal prior to discharge   - Without signs of bleeding  - Could be multifactorial, possibly due to infection   - Continue to trend  - Transfuse to maintain Plt > 10k unless actively bleeding then maintain > 50k     # Brain lesion  - pt with hx of seizures  - MRI brain now with 5.4 mm enhancing lesion in the LEFT middle cerebellar peduncle with associated edema suspicious for metastases  - MRI Lumbar negative for acute disease  - Small lesion without mass effect or edema, recommended to correlate per neurology if foci and locus are concordant with seizure activity  - Neurology recs noted, new lesion not likely to cause seizure  - It is rare, but nonetheless not impossible, for testicular cancer to be metastatic to the brain  - He will need another PET-CT, can be completed outpatient once stable if concern can proceed with biopsy at that time   - Previous endocrinology eval for thyroid nodule noted  - AFP/BHCH normal,  previously   - Repeat CTH without acute intracranial pathology       # 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 Ovalles, agree with current plan  - Follow up as outpatient with Franki Ovalles MD     # Acute kidney injury on CKD Stage IIIA  - Likely multifactorial  - Nephrology consult and recs noted  - Continue to trend     # Normocytic anemia  - Chronic, has hx of PRBC during chemo 07/2024  - Noted Anti E, Anti-K Anti-C antibodies previously  - direct josefina IgG positive, eluate negative, not likely to be clinically significant   - s/p 1u PRBC 03/03/2025   - Monitor for bleeding  - Recommend to transfuse to maintain Hb > 7    # Klebsiella UTI  -Antibiotics per ID and primary team       Recommendations  - Trend with CBC daily   - Transfuse to maintain Hb > 7   - Transfuse to maintain Plt >10k unless active bleeding then >50k   - Monitor renal function  - Antibiotics per primary team/MICU and ID   - Cardiology follow up to address anticoagulation, currently on heparin drip   - f/u ongoing ICU recs         Thank you for allowing me to participate in the care of Mr. Gr please do not hesitate to call or text me if you have further questions or concerns.     Brayan Mart MD  [facility] [place]   Division of Hematology/Oncology  [address] [place] [address], [address]  [place] [address]  P: [phone]  F: [phone]    Attestation:    ----Pt evaluated including face-to-face interaction in addition to chart review, reviewing treatment plan, and managing the patient’s chronic diagnoses as listed in the assessment----

## 2025-03-03 NOTE — PHYSICAL THERAPY INITIAL EVALUATION ADULT - PASSIVE RANGE OF MOTION EXAMINATION, REHAB EVAL
bilateral upper extremity Passive ROM was WFL (within functional limits)/bilateral lower extremity Passive ROM was WFL (within functional limits) AAROM all extremities WFL/bilateral upper extremity Passive ROM was WFL (within functional limits)/bilateral lower extremity Passive ROM was WFL (within functional limits)

## 2025-03-03 NOTE — PROGRESS NOTE ADULT - SUBJECTIVE AND OBJECTIVE BOX
[facility] HEMATOLOGY/ONCOLOGY INPATIENT PROGRESS NOTE     Interval Hx:   03-03-25: Mr. Gr was seen at bedside today.    Meds:   MEDICATIONS  (STANDING):  albuterol/ipratropium for Nebulization 3 milliLiter(s) Nebulizer every 6 hours  atorvastatin 20 milliGRAM(s) Oral at bedtime  chlorhexidine 0.12% Liquid 15 milliLiter(s) Oral Mucosa every 12 hours  cholecalciferol 1000 Unit(s) Oral daily  dexMEDEtomidine Infusion 0.2 MICROgram(s)/kG/Hr (4.46 mL/Hr) IV Continuous <Continuous>  escitalopram 15 milliGRAM(s) Oral with breakfast  gabapentin Solution 150 milliGRAM(s) Oral every 12 hours  heparin  Infusion. 1700 Unit(s)/Hr (17 mL/Hr) IV Continuous <Continuous>  influenza  Vaccine (HIGH DOSE) 0.5 milliLiter(s) IntraMuscular once  lacosamide IVPB 200 milliGRAM(s) IV Intermittent every 12 hours  lamoTRIgine 50 milliGRAM(s) Oral two times a day  levETIRAcetam   Injectable 750 milliGRAM(s) IV Push every 12 hours  lurasidone 40 milliGRAM(s) Oral at bedtime  naloxegol 12.5 milliGRAM(s) Oral daily  pantoprazole   Suspension 40 milliGRAM(s) Oral daily  polyethylene glycol 3350 17 Gram(s) Oral daily  propofol Infusion 10 MICROgram(s)/kG/Min (5.62 mL/Hr) IV Continuous <Continuous>  senna 2 Tablet(s) Oral at bedtime  sevelamer carbonate Powder 1600 milliGRAM(s) Oral every 8 hours  sodium chloride 3%  Inhalation 4 milliLiter(s) Inhalation every 6 hours    MEDICATIONS  (PRN):  heparin   Injectable 7000 Unit(s) IV Push every 6 hours PRN For aPTT less than 40  heparin   Injectable 3500 Unit(s) IV Push every 6 hours PRN For aPTT between 40 - 57    Vital Signs Last 24 Hrs  T(C): 36.9 (03 Mar 2025 00:00), Max: 37.1 (02 Mar 2025 20:00)  T(F): 98.4 (03 Mar 2025 00:00), Max: 98.8 (02 Mar 2025 20:00)  HR: 64 (03 Mar 2025 03:35) (59 - 97)  BP: 117/61 (03 Mar 2025 03:00) (109/57 - 166/77)  BP(mean): 85 (03 Mar 2025 03:00) (79 - 110)  RR: 16 (03 Mar 2025 03:00) (16 - 28)  SpO2: 100% (03 Mar 2025 03:35) (90% - 100%)    Parameters below as of 03 Mar 2025 03:35  Patient On (Oxygen Delivery Method): ventilator    Physical Exam:  Gen: Intubated  HEENT: EOMI, MMM  Chest: equal chest rise  Cardiac: regular   Abd: non distended   Neuro: sedated    Labs:                        6.8    7.14  )-----------( 108      ( 03 Mar 2025 00:57 )             21.9     CBC Full  -  ( 03 Mar 2025 00:57 )  WBC Count : 7.14 K/uL  RBC Count : 2.14 M/uL  Hemoglobin : 6.8 g/dL  Hematocrit : 21.9 %  Platelet Count - Automated : 108 K/uL  Mean Cell Volume : 102.3 fl  Mean Cell Hemoglobin : 31.8 pg  Mean Cell Hemoglobin Concentration : 31.1 g/dL    03-03    137  |  94[L]  |  121[H]  ----------------------------<  156[H]  4.2   |  22  |  4.34[H]    Ca    9.3      03 Mar 2025 00:01  Phos  9.9     03-03  Mg     2.9     03-03    TPro  7.3  /  Alb  2.7[L]  /  TBili  0.3  /  DBili  x   /  AST  35  /  ALT  37  /  AlkPhos  131[H]  03-03    PT/INR - ( 03 Mar 2025 00:01 )   PT: 12.0 sec;   INR: 1.05 ratio         PTT - ( 03 Mar 2025 00:01 )  PTT:75.3 sec  Bilirubin Total: 0.3 mg/dL (03-03-25 @ 00:01)   [facility] HEMATOLOGY/ONCOLOGY INPATIENT PROGRESS NOTE     Interval Hx:   03-03-25: Mr. Gr was seen at bedside today, continues in MICU, intubated, on heparin drip, 1u PRBC overnight, no signs of bleeding, platelet count stable     Meds:   MEDICATIONS  (STANDING):  albuterol/ipratropium for Nebulization 3 milliLiter(s) Nebulizer every 6 hours  atorvastatin 20 milliGRAM(s) Oral at bedtime  chlorhexidine 0.12% Liquid 15 milliLiter(s) Oral Mucosa every 12 hours  cholecalciferol 1000 Unit(s) Oral daily  dexMEDEtomidine Infusion 0.2 MICROgram(s)/kG/Hr (4.46 mL/Hr) IV Continuous <Continuous>  escitalopram 15 milliGRAM(s) Oral with breakfast  gabapentin Solution 150 milliGRAM(s) Oral every 12 hours  heparin  Infusion. 1700 Unit(s)/Hr (17 mL/Hr) IV Continuous <Continuous>  influenza  Vaccine (HIGH DOSE) 0.5 milliLiter(s) IntraMuscular once  lacosamide IVPB 200 milliGRAM(s) IV Intermittent every 12 hours  lamoTRIgine 50 milliGRAM(s) Oral two times a day  levETIRAcetam   Injectable 750 milliGRAM(s) IV Push every 12 hours  lurasidone 40 milliGRAM(s) Oral at bedtime  naloxegol 12.5 milliGRAM(s) Oral daily  pantoprazole   Suspension 40 milliGRAM(s) Oral daily  polyethylene glycol 3350 17 Gram(s) Oral daily  propofol Infusion 10 MICROgram(s)/kG/Min (5.62 mL/Hr) IV Continuous <Continuous>  senna 2 Tablet(s) Oral at bedtime  sevelamer carbonate Powder 1600 milliGRAM(s) Oral every 8 hours  sodium chloride 3%  Inhalation 4 milliLiter(s) Inhalation every 6 hours    MEDICATIONS  (PRN):  heparin   Injectable 7000 Unit(s) IV Push every 6 hours PRN For aPTT less than 40  heparin   Injectable 3500 Unit(s) IV Push every 6 hours PRN For aPTT between 40 - 57    Vital Signs Last 24 Hrs  T(C): 36.9 (03 Mar 2025 00:00), Max: 37.1 (02 Mar 2025 20:00)  T(F): 98.4 (03 Mar 2025 00:00), Max: 98.8 (02 Mar 2025 20:00)  HR: 64 (03 Mar 2025 03:35) (59 - 97)  BP: 117/61 (03 Mar 2025 03:00) (109/57 - 166/77)  BP(mean): 85 (03 Mar 2025 03:00) (79 - 110)  RR: 16 (03 Mar 2025 03:00) (16 - 28)  SpO2: 100% (03 Mar 2025 03:35) (90% - 100%)    Parameters below as of 03 Mar 2025 03:35  Patient On (Oxygen Delivery Method): ventilator    Physical Exam:  Gen: Intubated  HEENT: EOMI, MMM  Chest: equal chest rise  Cardiac: regular   Abd: non distended   Neuro: sedated    Labs:                        6.8    7.14  )-----------( 108      ( 03 Mar 2025 00:57 )             21.9     CBC Full  -  ( 03 Mar 2025 00:57 )  WBC Count : 7.14 K/uL  RBC Count : 2.14 M/uL  Hemoglobin : 6.8 g/dL  Hematocrit : 21.9 %  Platelet Count - Automated : 108 K/uL  Mean Cell Volume : 102.3 fl  Mean Cell Hemoglobin : 31.8 pg  Mean Cell Hemoglobin Concentration : 31.1 g/dL    03-03    137  |  94[L]  |  121[H]  ----------------------------<  156[H]  4.2   |  22  |  4.34[H]    Ca    9.3      03 Mar 2025 00:01  Phos  9.9     03-03  Mg     2.9     03-03    TPro  7.3  /  Alb  2.7[L]  /  TBili  0.3  /  DBili  x   /  AST  35  /  ALT  37  /  AlkPhos  131[H]  03-03    PT/INR - ( 03 Mar 2025 00:01 )   PT: 12.0 sec;   INR: 1.05 ratio         PTT - ( 03 Mar 2025 00:01 )  PTT:75.3 sec  Bilirubin Total: 0.3 mg/dL (03-03-25 @ 00:01)

## 2025-03-03 NOTE — PHYSICAL THERAPY INITIAL EVALUATION ADULT - GENERAL OBSERVATIONS, REHAB EVAL
Rec'd semi-supine in bed in NAD, VSS, +ETT, +precedex, +IV w/ PRBC transfusion, +hopson, +ICU monitoring, A&O x2 (self, place) via head nodding/shaking yes/no with choices. Pt lethargic t/o session, opening eyes and attending to name with repeated verbal/tactile stimulation. Following ~25% of simple commands. (3/4): Recently extubated. Rec'd semi-supine in bed in NAD, VSS, +BiPAP, +hopson, +IV, +ICU monitoring, A&O x3. Following 100% of simple commands. (3/4): Recently extubated. Rec'd semi-supine in bed in NAD, VSS, +BiPAP/AVAPS, +hopson, +IV, +ICU monitoring, A&O x3. Following 100% of simple commands.

## 2025-03-03 NOTE — PROGRESS NOTE ADULT - SUBJECTIVE AND OBJECTIVE BOX
[facility]   [phone]  RENAL FOLLOW UP NOTE  --------------------------------------------------------------------------------  Chief Complaint:    24 hour events/subjective:    seen earlier   intubated     PAST HISTORY  --------------------------------------------------------------------------------  No significant changes to PMH, PSH, FHx, SHx, unless otherwise noted    ALLERGIES & MEDICATIONS  --------------------------------------------------------------------------------  Allergies    seasonal allergies (Unknown)  penicillin (Hives)    Intolerances    latex (Rash)    Standing Inpatient Medications  albuterol/ipratropium for Nebulization 3 milliLiter(s) Nebulizer every 6 hours  atorvastatin 20 milliGRAM(s) Oral at bedtime  chlorhexidine 0.12% Liquid 15 milliLiter(s) Oral Mucosa every 12 hours  cholecalciferol 1000 Unit(s) Oral daily  dexMEDEtomidine Infusion 0.2 MICROgram(s)/kG/Hr IV Continuous <Continuous>  escitalopram 15 milliGRAM(s) Oral with breakfast  gabapentin Solution 150 milliGRAM(s) Oral every 12 hours  heparin  Infusion. 1700 Unit(s)/Hr IV Continuous <Continuous>  influenza  Vaccine (HIGH DOSE) 0.5 milliLiter(s) IntraMuscular once  lacosamide IVPB 200 milliGRAM(s) IV Intermittent every 12 hours  lamoTRIgine 50 milliGRAM(s) Oral two times a day  levETIRAcetam   Injectable 750 milliGRAM(s) IV Push every 12 hours  lurasidone 20 milliGRAM(s) Oral at bedtime  naloxegol 12.5 milliGRAM(s) Oral daily  pantoprazole   Suspension 40 milliGRAM(s) Oral daily  polyethylene glycol 3350 17 Gram(s) Oral daily  propofol Infusion 10 MICROgram(s)/kG/Min IV Continuous <Continuous>  senna 2 Tablet(s) Oral at bedtime  sevelamer carbonate Powder 1600 milliGRAM(s) Oral every 8 hours  sodium chloride 3%  Inhalation 4 milliLiter(s) Inhalation every 6 hours    PRN Inpatient Medications  artificial tears (preservative free) Ophthalmic Solution 1 Drop(s) Both EYES every 6 hours PRN      REVIEW OF SYSTEMS  --------------------------------------------------------------------------------        VITALS/PHYSICAL EXAM  --------------------------------------------------------------------------------  T(C): 36.3 (03-03-25 @ 12:00), Max: 37.1 (03-02-25 @ 20:00)  HR: 82 (03-03-25 @ 18:17) (59 - 92)  BP: 144/64 (03-03-25 @ 14:00) (109/57 - 144/64)  RR: 17 (03-03-25 @ 14:00) (16 - 21)  SpO2: 98% (03-03-25 @ 18:17) (98% - 100%)  Wt(kg): --  Height (cm): 167.6 (03-01-25 @ 20:00)  Weight (kg): 89.1 (03-01-25 @ 20:00)  BMI (kg/m2): 31.7 (03-01-25 @ 20:00)  BSA (m2): 1.98 (03-01-25 @ 20:00)      03-02-25 @ 07:01  -  03-03-25 @ 07:00  --------------------------------------------------------  IN: 2177.7 mL / OUT: 850 mL / NET: 1327.7 mL    03-03-25 @ 07:01  -  03-03-25 @ 18:52  --------------------------------------------------------  IN: 1244.9 mL / OUT: 250 mL / NET: 994.9 mL      Physical Exam:  	      Gen: ill appearing male, intubated   	Pulm: decrease bs, +coarse   	CV: No JVD. RRR, S1S2; no rub  	Abd: +BS, soft, nondistended  	: No suprapubic tenderness, external catheter  	UE: Warm, no cyanosis  no clubbing,  no edema;  	LE: Warm, no cyanosis  no clubbing, no edema      LABS/STUDIES  --------------------------------------------------------------------------------              7.4    7.06  >-----------<  115      [03-03-25 @ 12:41]              22.5     137  |  94  |  121  ----------------------------<  156      [03-03-25 @ 00:01]  4.2   |  22  |  4.34        Ca     9.3     [03-03-25 @ 00:01]      Mg     2.9     [03-03-25 @ 00:01]      Phos  9.9     [03-03-25 @ 00:01]    TPro  7.3  /  Alb  2.7  /  TBili  0.3  /  DBili  x   /  AST  35  /  ALT  37  /  AlkPhos  131  [03-03-25 @ 00:01]    PT/INR: PT 12.0 , INR 1.05       [03-03-25 @ 00:01]  PTT: 75.3       [03-03-25 @ 00:01]      Creatinine Trend:  SCr 4.34 [03-03 @ 00:01]  SCr 4.06 [03-02 @ 12:01]  SCr 4.11 [03-02 @ 03:48]  SCr 4.28 [03-01 @ 12:36]  SCr 4.24 [03-01 @ 00:50]            Ferritin [id number]      [02-23-25 @ 06:15]  TSH 0.87      [01-25-25 @ 11:31]

## 2025-03-03 NOTE — PROGRESS NOTE ADULT - NSPROGADDITIONALINFOA_GEN_ALL_CORE
speech & swallow eval appreciated, s/p FEES: recommends thicken pureed diet.  events reviewed, RRT for hypoxia, intubated/sedated for increased work of breathing.   transferred to ICU. ICU eval appreciated  GOC discussed, full code per the sister.   Transferred to ICU, remain intubated.   Bumex gtt for overload, now off. weaned off pressors.   completed abx, completed antiviral.   ICU care appreciated.   on sedation vacation. vent management per ICU.  vEEG per ICU team: no seizures.  s/p 1 unit prbc overnight, monitor hgb.     - Dr. SHIRA Leroy ([facility])  - [phone]

## 2025-03-03 NOTE — PROGRESS NOTE ADULT - SUBJECTIVE AND OBJECTIVE BOX
[facility]  JACINTO Horton Y. Patel, S. Shah, G. Casimir  [phone]  ([phone] - weekdays after 5pm and weekends)    Name: OSCAR MILAN  Age/Gender: 69y Male  MRN: [id number]    Interval History:  Patient seen and examined this morning.   Intubated, sedated, on vEEG, unable to obtain ROS.   Notes reviewed. No concerning overnight events  Afebrile   Allergies: seasonal allergies (Unknown)  penicillin (Hives)      Objective:  Vitals:   T(F): 98.2 (03-03-25 @ 04:00), Max: 98.8 (03-02-25 @ 20:00)  HR: 61 (03-03-25 @ 07:00) (59 - 97)  BP: 118/55 (03-03-25 @ 07:00) (109/57 - 166/73)  RR: 18 (03-03-25 @ 07:00) (16 - 28)  SpO2: 100% (03-03-25 @ 07:00) (98% - 100%)  Physical Examination:  General: no acute distress, vEEG  HEENT: normocephalic, atraumatic, ETT  Respiratory: clear to auscultation anteriorly   Cardiovascular: S1S2 present, normal rate   Gastrointestinal: soft, nondistended  Extremities: no LE edema, no cyanosis  Skin: no visible rash    Laboratory Studies:  CBC:                       6.8    7.14  )-----------( 108      ( 03 Mar 2025 00:57 )             21.9     WBC Trend:  7.14 03-03-25 @ 00:57  8.02 03-03-25 @ 00:01  9.10 03-02-25 @ 12:01  9.10 03-02-25 @ 03:49  7.36 03-01-25 @ 00:50  8.31 02-28-25 @ 00:29  6.50 02-27-25 @ 00:24  7.55 02-26-25 @ 00:17  9.17 02-25-25 @ 00:59    CMP: 03-03    137  |  94[L]  |  121[H]  ----------------------------<  156[H]  4.2   |  22  |  4.34[H]    Ca    9.3      03 Mar 2025 00:01  Phos  9.9     03-03  Mg     2.9     03-03    TPro  7.3  /  Alb  2.7[L]  /  TBili  0.3  /  DBili  x   /  AST  35  /  ALT  37  /  AlkPhos  131[H]  03-03    Creatinine: 4.34 mg/dL (03-03-25 @ 00:01)  Creatinine: 4.06 mg/dL (03-02-25 @ 12:01)  Creatinine: 4.11 mg/dL (03-02-25 @ 03:48)  Creatinine: 4.28 mg/dL (03-01-25 @ 12:36)  Creatinine: 4.24 mg/dL (03-01-25 @ 00:50)  Creatinine: 3.89 mg/dL (02-28-25 @ 12:33)  Creatinine: 4.01 mg/dL (02-28-25 @ 00:29)  Creatinine: 4.03 mg/dL (02-27-25 @ 09:21)  Creatinine: 4.08 mg/dL (02-27-25 @ 00:24)  Creatinine: 3.95 mg/dL (02-26-25 @ 16:13)  Creatinine: 4.03 mg/dL (02-26-25 @ 07:53)  Creatinine: 4.01 mg/dL (02-26-25 @ 00:17)  Creatinine: 4.14 mg/dL (02-25-25 @ 15:50)  Creatinine: 4.24 mg/dL (02-25-25 @ 00:59)  Creatinine: 4.24 mg/dL (02-24-25 @ 15:24)  Creatinine: 4.13 mg/dL (02-24-25 @ 11:38)    LIVER FUNCTIONS - ( 03 Mar 2025 00:01 )  Alb: 2.7 g/dL / Pro: 7.3 g/dL / ALK PHOS: 131 U/L / ALT: 37 U/L / AST: 35 U/L / GGT: x           Microbiology: reviewed   Culture - Blood (collected 02-21-25 @ 19:36)  Source: .Blood Blood  Final Report (02-27-25 @ 02:01):    No growth at 5 days    Culture - Blood (collected 02-21-25 @ 19:35)  Source: .Blood Blood  Final Report (02-27-25 @ 02:01):    No growth at 5 days    Culture - Blood (collected 02-19-25 @ 00:18)  Source: .Blood Blood-Peripheral  Final Report (02-24-25 @ 03:01):    No growth at 5 days    Culture - Blood (collected 02-19-25 @ 00:18)  Source: .Blood Blood-Peripheral  Final Report (02-24-25 @ 03:01):    No growth at 5 days    Culture - Sputum (collected 02-17-25 @ 22:23)  Source: .Sputum Sputum  Gram Stain (02-18-25 @ 06:21):    Few polymorphonuclear leukocytes per low power field    No Squamous epithelial cells per low power field    Few Gram Variable Rods seen per oil power field    Rare Gram positive cocci in pairs seen per oil power field  Final Report (02-19-25 @ 16:20):    Commensal lucia consistent with body site    Radiology: reviewed   < from: CT Head No Cont (03.01.25 @ 16:16) >  IMPRESSION:  No acute intracranial hemorrhage, mass effect, or midline shift.    Scattered mucosal thickening.  Opacification of the posterior right   ethmoid air cell.  Trace fluid in the sphenoid sinuses may represent   trapped secretions versus sinusitis.    Partial opacification of the left mastoid air cells; correlate clinically   for sterile effusion versus acute otitis media.        --- End of Report ---    < end of copied text >    Medications:  albuterol/ipratropium for Nebulization 3 milliLiter(s) Nebulizer every 6 hours  atorvastatin 20 milliGRAM(s) Oral at bedtime  chlorhexidine 0.12% Liquid 15 milliLiter(s) Oral Mucosa every 12 hours  cholecalciferol 1000 Unit(s) Oral daily  dexMEDEtomidine Infusion 0.2 MICROgram(s)/kG/Hr IV Continuous <Continuous>  escitalopram 15 milliGRAM(s) Oral with breakfast  gabapentin Solution 150 milliGRAM(s) Oral every 12 hours  heparin   Injectable 7000 Unit(s) IV Push every 6 hours PRN  heparin   Injectable 3500 Unit(s) IV Push every 6 hours PRN  heparin  Infusion. 1700 Unit(s)/Hr IV Continuous <Continuous>  influenza  Vaccine (HIGH DOSE) 0.5 milliLiter(s) IntraMuscular once  lacosamide IVPB 200 milliGRAM(s) IV Intermittent every 12 hours  lamoTRIgine 50 milliGRAM(s) Oral two times a day  levETIRAcetam   Injectable 750 milliGRAM(s) IV Push every 12 hours  lurasidone 40 milliGRAM(s) Oral at bedtime  naloxegol 12.5 milliGRAM(s) Oral daily  pantoprazole   Suspension 40 milliGRAM(s) Oral daily  polyethylene glycol 3350 17 Gram(s) Oral daily  propofol Infusion 10 MICROgram(s)/kG/Min IV Continuous <Continuous>  senna 2 Tablet(s) Oral at bedtime  sevelamer carbonate Powder 1600 milliGRAM(s) Oral every 8 hours  sodium chloride 3%  Inhalation 4 milliLiter(s) Inhalation every 6 hours    Prior/Completed Antimicrobials:  piperacillin/tazobactam IVPB..  piperacillin/tazobactam IVPB..  piperacillin/tazobactam IVPB...  remdesivir  IVPB  remdesivir  IVPB  remdesivir  IVPB  vancomycin  IVPB.

## 2025-03-03 NOTE — PROGRESS NOTE ADULT - ASSESSMENT
Patient is a 69 year old male with PMH of HTN, HLD, VAZQUEZ (on CPAP at bedtime, NC 2 liters during the day), CKD3, epilepsy, schizophrenia, developmental delay, metastatic testicular cancer (s/p orchiectomy, actively on chemotherapy, last dose of etoposide/cisplatin on 6/2024) presenting with worsening shortness of breath. Patient was recently hospitalized at [facility] from January 27th 2025 to February 6th 2025 for seizure and influenza virus infection, which required intubation. He was sent to rehab (originally from home) from hospital and now returns due to sudden onset shortness of breath and worsening oxygenation. Of note, he tested positive for COVID-19 at facility on 2/13/25 and was not put on any COVID-19 treatment at the time, only guaifenesin and scopolamine patch. Patient was placed on non-rebreather and sent to the hospital on 2/16/25. Noted initial discussion with patient/family and they decided to hold off on remdesivir given LFTs and SHAGUFTA.     Acute on chronic hypoxic respiratory failure  COVID-19 pneumonia, superimposed bacterial pneumonia  Acute on chronic HFpEF  Klebsiella UTI, treated   SHAGUFTA on CKD  Course c/b severe sepsis, hypotension, SHAGUFTA, likely due to aspiration pneumonia   - CT chest with extensive b/l ggo c/w COVID pneumonia; tracheomalacia   - MRSA PCR screen negative, s/p vancomycin   - 2/18 worsening resp status on NC requiring AVAPS, started on remdesivir   - 2/22 completed remdesivir x5d course   - 2/23 s/p RRT for inc WOB, s/p intubation, suspected aspiration   - 2/24 intubated, on sedation, pressor support, SHAGUFTA, WBC wnl   - 2/25 afebrile, off pressor, WBC wnl, Cr stable  - 2/26 CT chest with improved upper lobe ggo, new/worsening confluent areas of consolidation in mid-lower lungs   - 2/26 completed dexamethasone x10d  - 2/28 remains afebrile, on FiO2 40%, no leukocytosis, completed zosyn     Recommendations:  Continue to monitor off antibiotics   Nephrology following, monitor Cr   Diuresis as tolerated   Monitor temps/WBC  Supportive care  Aspiration precautions  Continue rest of care per primary team       Greyson Mart M.D.  [facility]  Available on [employer] TEAMS - *PREFERRED*  [phone]  After 5pm on weekdays and all day on weekends - please call [phone]     Thank you for consulting us and involving us in the management of this patients case. In addition to reviewing history, imaging, documents, labs, microbiology, took into account antibiotic stewardship, local antibiogram and infection control strategies and potential transmission issues.

## 2025-03-03 NOTE — PROGRESS NOTE ADULT - PROBLEM SELECTOR PLAN 7
Continue with home atorvastatin 20 mg bedtime for HLD, vitamin D supplementation, pantoprazole 40 mg daily, senna 2 tab bedtime, and Miralax 17 gram daily.     General  - DVT prophylaxis: heparin 5000 units q8h --> hep gtt per ICU  - Diet: regular   - Medication reconciliation: complete   - Code: Full   - Medications: Tylenol 650 mg q6h as needed for pain, Maalox 30 mL q4h as needed for acid reflux, melatonin 3 mg bedtime as needed for insomnia, Zofran 4 mg IV q8h as needed for nausea/vomiting      2/16/25 --> plan was discussed with patient's brother Fernando (over the phone, [phone]) in detail. He agrees with plan. All questions answered. He asked that I update Ester (sister, [phone]); she was called and updated as well, also agrees with the plan, also answered all questions.

## 2025-03-04 LAB
ALBUMIN SERPL ELPH-MCNC: 2.9 G/DL — LOW (ref 3.3–5)
ALP SERPL-CCNC: 120 U/L — SIGNIFICANT CHANGE UP (ref 40–120)
ALT FLD-CCNC: 32 U/L — SIGNIFICANT CHANGE UP (ref 10–45)
ANION GAP SERPL CALC-SCNC: 21 MMOL/L — HIGH (ref 5–17)
APTT BLD: 69.7 SEC — HIGH (ref 24.5–35.6)
AST SERPL-CCNC: 34 U/L — SIGNIFICANT CHANGE UP (ref 10–40)
BASOPHILS # BLD AUTO: 0.01 K/UL — SIGNIFICANT CHANGE UP (ref 0–0.2)
BASOPHILS NFR BLD AUTO: 0.1 % — SIGNIFICANT CHANGE UP (ref 0–2)
BILIRUB SERPL-MCNC: 0.3 MG/DL — SIGNIFICANT CHANGE UP (ref 0.2–1.2)
BUN SERPL-MCNC: 126 MG/DL — HIGH (ref 7–23)
CALCIUM SERPL-MCNC: 9.1 MG/DL — SIGNIFICANT CHANGE UP (ref 8.4–10.5)
CHLORIDE SERPL-SCNC: 96 MMOL/L — SIGNIFICANT CHANGE UP (ref 96–108)
CO2 SERPL-SCNC: 23 MMOL/L — SIGNIFICANT CHANGE UP (ref 22–31)
CREAT SERPL-MCNC: 3.99 MG/DL — HIGH (ref 0.5–1.3)
EGFR: 16 ML/MIN/1.73M2 — LOW
EGFR: 16 ML/MIN/1.73M2 — LOW
EOSINOPHIL # BLD AUTO: 0.18 K/UL — SIGNIFICANT CHANGE UP (ref 0–0.5)
EOSINOPHIL NFR BLD AUTO: 2.1 % — SIGNIFICANT CHANGE UP (ref 0–6)
GAS PNL BLDA: SIGNIFICANT CHANGE UP
GLUCOSE BLDC GLUCOMTR-MCNC: 162 MG/DL — HIGH (ref 70–99)
GLUCOSE SERPL-MCNC: 146 MG/DL — HIGH (ref 70–99)
HCT VFR BLD CALC: 22.8 % — LOW (ref 39–50)
HGB BLD-MCNC: 7.3 G/DL — LOW (ref 13–17)
IMM GRANULOCYTES NFR BLD AUTO: 2.7 % — HIGH (ref 0–0.9)
INR BLD: 0.96 RATIO — SIGNIFICANT CHANGE UP (ref 0.85–1.16)
LYMPHOCYTES # BLD AUTO: 0.62 K/UL — LOW (ref 1–3.3)
LYMPHOCYTES # BLD AUTO: 7.3 % — LOW (ref 13–44)
MAGNESIUM SERPL-MCNC: 2.9 MG/DL — HIGH (ref 1.6–2.6)
MCHC RBC-ENTMCNC: 32 G/DL — SIGNIFICANT CHANGE UP (ref 32–36)
MCHC RBC-ENTMCNC: 32.4 PG — SIGNIFICANT CHANGE UP (ref 27–34)
MCV RBC AUTO: 101.3 FL — HIGH (ref 80–100)
MONOCYTES # BLD AUTO: 0.35 K/UL — SIGNIFICANT CHANGE UP (ref 0–0.9)
MONOCYTES NFR BLD AUTO: 4.1 % — SIGNIFICANT CHANGE UP (ref 2–14)
NEUTROPHILS # BLD AUTO: 7.09 K/UL — SIGNIFICANT CHANGE UP (ref 1.8–7.4)
NEUTROPHILS NFR BLD AUTO: 83.7 % — HIGH (ref 43–77)
NRBC BLD AUTO-RTO: 0 /100 WBCS — SIGNIFICANT CHANGE UP (ref 0–0)
PHOSPHATE SERPL-MCNC: 9.1 MG/DL — HIGH (ref 2.5–4.5)
PLATELET # BLD AUTO: 109 K/UL — LOW (ref 150–400)
POTASSIUM SERPL-MCNC: 4 MMOL/L — SIGNIFICANT CHANGE UP (ref 3.5–5.3)
POTASSIUM SERPL-SCNC: 4 MMOL/L — SIGNIFICANT CHANGE UP (ref 3.5–5.3)
PROT SERPL-MCNC: 6.8 G/DL — SIGNIFICANT CHANGE UP (ref 6–8.3)
PROTHROM AB SERPL-ACNC: 11.1 SEC — SIGNIFICANT CHANGE UP (ref 9.9–13.4)
RBC # BLD: 2.25 M/UL — LOW (ref 4.2–5.8)
RBC # FLD: 16.6 % — HIGH (ref 10.3–14.5)
SODIUM SERPL-SCNC: 140 MMOL/L — SIGNIFICANT CHANGE UP (ref 135–145)
WBC # BLD: 8.48 K/UL — SIGNIFICANT CHANGE UP (ref 3.8–10.5)
WBC # FLD AUTO: 8.48 K/UL — SIGNIFICANT CHANGE UP (ref 3.8–10.5)

## 2025-03-04 PROCEDURE — 71045 X-RAY EXAM CHEST 1 VIEW: CPT | Mod: 26

## 2025-03-04 PROCEDURE — 99291 CRITICAL CARE FIRST HOUR: CPT | Mod: FS,25

## 2025-03-04 PROCEDURE — 43753 TX GASTRO INTUB W/ASP: CPT

## 2025-03-04 RX ORDER — LAMOTRIGINE 150 MG/1
50 TABLET ORAL
Refills: 0 | Status: DISCONTINUED | OUTPATIENT
Start: 2025-03-04 | End: 2025-03-16

## 2025-03-04 RX ORDER — SEVELAMER HYDROCHLORIDE 800 MG/1
1600 TABLET ORAL EVERY 8 HOURS
Refills: 0 | Status: DISCONTINUED | OUTPATIENT
Start: 2025-03-04 | End: 2025-03-04

## 2025-03-04 RX ORDER — SENNA 187 MG
10 TABLET ORAL AT BEDTIME
Refills: 0 | Status: DISCONTINUED | OUTPATIENT
Start: 2025-03-04 | End: 2025-03-16

## 2025-03-04 RX ORDER — GABAPENTIN 400 MG/1
150 CAPSULE ORAL EVERY 12 HOURS
Refills: 0 | Status: DISCONTINUED | OUTPATIENT
Start: 2025-03-04 | End: 2025-03-16

## 2025-03-04 RX ORDER — SEVELAMER HYDROCHLORIDE 800 MG/1
1600 TABLET ORAL EVERY 8 HOURS
Refills: 0 | Status: DISCONTINUED | OUTPATIENT
Start: 2025-03-04 | End: 2025-03-16

## 2025-03-04 RX ORDER — NALOXEGOL OXALATE 12.5 MG/1
12.5 TABLET, FILM COATED ORAL DAILY
Refills: 0 | Status: DISCONTINUED | OUTPATIENT
Start: 2025-03-04 | End: 2025-03-05

## 2025-03-04 RX ORDER — ERYTHROMYCIN 5 MG/G
1 OINTMENT OPHTHALMIC
Refills: 0 | Status: COMPLETED | OUTPATIENT
Start: 2025-03-04 | End: 2025-03-11

## 2025-03-04 RX ORDER — POLYETHYLENE GLYCOL 3350 17 G/17G
17 POWDER, FOR SOLUTION ORAL DAILY
Refills: 0 | Status: DISCONTINUED | OUTPATIENT
Start: 2025-03-04 | End: 2025-03-16

## 2025-03-04 RX ORDER — LURASIDONE HYDROCHLORIDE 120 MG/1
20 TABLET, FILM COATED ORAL AT BEDTIME
Refills: 0 | Status: DISCONTINUED | OUTPATIENT
Start: 2025-03-04 | End: 2025-03-16

## 2025-03-04 RX ORDER — ATORVASTATIN CALCIUM 80 MG/1
20 TABLET, FILM COATED ORAL AT BEDTIME
Refills: 0 | Status: DISCONTINUED | OUTPATIENT
Start: 2025-03-04 | End: 2025-03-16

## 2025-03-04 RX ORDER — SODIUM CHLORIDE 9 G/1000ML
1000 INJECTION, SOLUTION INTRAVENOUS
Refills: 0 | Status: DISCONTINUED | OUTPATIENT
Start: 2025-03-04 | End: 2025-03-05

## 2025-03-04 RX ORDER — ESCITALOPRAM OXALATE 20 MG/1
15 TABLET ORAL
Refills: 0 | Status: DISCONTINUED | OUTPATIENT
Start: 2025-03-04 | End: 2025-03-16

## 2025-03-04 RX ADMIN — IPRATROPIUM BROMIDE AND ALBUTEROL SULFATE 3 MILLILITER(S): .5; 2.5 SOLUTION RESPIRATORY (INHALATION) at 11:30

## 2025-03-04 RX ADMIN — Medication 4 MILLILITER(S): at 23:13

## 2025-03-04 RX ADMIN — IPRATROPIUM BROMIDE AND ALBUTEROL SULFATE 3 MILLILITER(S): .5; 2.5 SOLUTION RESPIRATORY (INHALATION) at 17:16

## 2025-03-04 RX ADMIN — ERYTHROMYCIN 1 APPLICATION(S): 5 OINTMENT OPHTHALMIC at 18:00

## 2025-03-04 RX ADMIN — Medication 15 MILLILITER(S): at 05:25

## 2025-03-04 RX ADMIN — LACOSAMIDE 140 MILLIGRAM(S): 150 TABLET, FILM COATED ORAL at 18:00

## 2025-03-04 RX ADMIN — LEVETIRACETAM 750 MILLIGRAM(S): 10 INJECTION, SOLUTION INTRAVENOUS at 05:24

## 2025-03-04 RX ADMIN — LACOSAMIDE 140 MILLIGRAM(S): 150 TABLET, FILM COATED ORAL at 06:38

## 2025-03-04 RX ADMIN — Medication 1000 UNIT(S): at 11:13

## 2025-03-04 RX ADMIN — SEVELAMER HYDROCHLORIDE 1600 MILLIGRAM(S): 800 TABLET ORAL at 05:24

## 2025-03-04 RX ADMIN — Medication 4 MILLILITER(S): at 11:30

## 2025-03-04 RX ADMIN — LAMOTRIGINE 50 MILLIGRAM(S): 150 TABLET ORAL at 22:32

## 2025-03-04 RX ADMIN — ERYTHROMYCIN 1 APPLICATION(S): 5 OINTMENT OPHTHALMIC at 23:59

## 2025-03-04 RX ADMIN — SEVELAMER HYDROCHLORIDE 1600 MILLIGRAM(S): 800 TABLET ORAL at 14:00

## 2025-03-04 RX ADMIN — IPRATROPIUM BROMIDE AND ALBUTEROL SULFATE 3 MILLILITER(S): .5; 2.5 SOLUTION RESPIRATORY (INHALATION) at 23:13

## 2025-03-04 RX ADMIN — GABAPENTIN 150 MILLIGRAM(S): 400 CAPSULE ORAL at 05:27

## 2025-03-04 RX ADMIN — ESCITALOPRAM OXALATE 15 MILLIGRAM(S): 20 TABLET ORAL at 07:36

## 2025-03-04 RX ADMIN — LAMOTRIGINE 50 MILLIGRAM(S): 150 TABLET ORAL at 05:25

## 2025-03-04 RX ADMIN — IPRATROPIUM BROMIDE AND ALBUTEROL SULFATE 3 MILLILITER(S): .5; 2.5 SOLUTION RESPIRATORY (INHALATION) at 05:26

## 2025-03-04 RX ADMIN — Medication 4 MILLILITER(S): at 17:16

## 2025-03-04 RX ADMIN — Medication 1 DROP(S): at 07:36

## 2025-03-04 RX ADMIN — Medication 40 MILLIGRAM(S): at 11:12

## 2025-03-04 RX ADMIN — LEVETIRACETAM 750 MILLIGRAM(S): 10 INJECTION, SOLUTION INTRAVENOUS at 18:00

## 2025-03-04 RX ADMIN — Medication 4 MILLILITER(S): at 05:26

## 2025-03-04 NOTE — PROGRESS NOTE ADULT - PROBLEM SELECTOR PLAN 7
Continue with home atorvastatin 20 mg bedtime for HLD, vitamin D supplementation, pantoprazole 40 mg daily, senna 2 tab bedtime, and Miralax 17 gram daily.     General  - DVT prophylaxis: heparin 5000 units q8h --> hep gtt per ICU  - Diet: regular   - Medication reconciliation: complete   - Code: Full   - Medications: Tylenol 650 mg q6h as needed for pain, Maalox 30 mL q4h as needed for acid reflux, melatonin 3 mg bedtime as needed for insomnia, Zofran 4 mg IV q8h as needed for nausea/vomiting      2/16/25 --> plan was discussed with patient's brother Fernando (over the phone, [phone]) in detail. He agrees with plan. All questions answered. He asked that I update Ester (sister, [phone]); she was called and updated as well, also agrees with the plan, also answered all questions.

## 2025-03-04 NOTE — PROGRESS NOTE ADULT - PROBLEM SELECTOR PLAN 1
-Recent admission for acute respiratory failure in the setting of grand mal seizures, influenza, PNA. S/p intubation in MICU, was extubated to AVAPS  -Now COVID +  -CXR with low lung volumes, mild congestion. Possible underlying infiltrate  -CT chest with b/l GGO, new since 1/30/25. Likely COVID PNA +/- superimposed bacterial PNA.   -Started on HFNC 2/18  -S/p multiple RRTs for hypoxia, increased WOB. Intubated s/p RRT on 2/23 & transferred to MICU  -Continue bronchodilators  -ABX per ID/MICU  -Pressure support trials as tolerated  -Keep sats >90%.  2/26: remains intubated:  on 40% fio2:  per MICU :  cont antibiotics:  cxr with Improvement in upper lobes  2/27: seems to be doing same:  remains intubated and sedated : on antibiotics and pressors:  cont current rx  3/1: no change in status:  remains the same:  intubated and sedated  3/2: remains intubated:  but on cpap trials now:  off sedation:  cont current weaning trials  3/3: getting eeg today  : remains lethargic:  on precedex at this ti me: off antibiotics: id following : cont weaning trials:  3/4: today he looks  more alert and awake:  tries to open his eys:  on weaning trials:

## 2025-03-04 NOTE — PROGRESS NOTE ADULT - SUBJECTIVE AND OBJECTIVE BOX
[facility]  JACINTO Horton Y. Patel, S. Shah, G. Casimir  [phone]  ([phone] - weekdays after 5pm and weekends)    Name: OSCAR MILAN  Age/Gender: 69y Male  MRN: [id number]    Interval History:  Patient seen and examined this morning.   Intubated, unable to obtain ROS.   Notes reviewed  No concerning overnight events  Afebrile   Allergies: seasonal allergies (Unknown)  penicillin (Hives)      Objective:  Vitals:   T(F): 99.1 (03-04-25 @ 04:00), Max: 99.1 (03-04-25 @ 00:00)  HR: 62 (03-04-25 @ 07:00) (62 - 99)  BP: 145/61 (03-04-25 @ 07:00) (127/59 - 176/81)  RR: 25 (03-04-25 @ 07:00) (16 - 25)  SpO2: 99% (03-04-25 @ 07:00) (96% - 100%)  Physical Examination:  General: no acute distress  HEENT: normocephalic, atraumatic, ETT  Respiratory: decreased breath sounds  Cardiovascular: S1 S2 present, normal rate   Gastrointestinal: soft, nondistended  Extremities: no LE edema, no cyanosis  Skin: no visible rash    Laboratory Studies:  CBC:                       7.3    8.48  )-----------( 109      ( 04 Mar 2025 00:40 )             22.8     WBC Trend:  8.48 03-04-25 @ 00:40  7.06 03-03-25 @ 12:41  7.14 03-03-25 @ 00:57  8.02 03-03-25 @ 00:01  9.10 03-02-25 @ 12:01  9.10 03-02-25 @ 03:49  7.36 03-01-25 @ 00:50  8.31 02-28-25 @ 00:29  6.50 02-27-25 @ 00:24  7.55 02-26-25 @ 00:17    CMP: 03-04    140  |  96  |  126[H]  ----------------------------<  146[H]  4.0   |  23  |  3.99[H]    Ca    9.1      04 Mar 2025 00:26  Phos  9.1     03-04  Mg     2.9     03-04    TPro  6.8  /  Alb  2.9[L]  /  TBili  0.3  /  DBili  x   /  AST  34  /  ALT  32  /  AlkPhos  120  03-04    Creatinine: 3.99 mg/dL (03-04-25 @ 00:26)  Creatinine: 4.34 mg/dL (03-03-25 @ 00:01)  Creatinine: 4.06 mg/dL (03-02-25 @ 12:01)  Creatinine: 4.11 mg/dL (03-02-25 @ 03:48)  Creatinine: 4.28 mg/dL (03-01-25 @ 12:36)  Creatinine: 4.24 mg/dL (03-01-25 @ 00:50)  Creatinine: 3.89 mg/dL (02-28-25 @ 12:33)  Creatinine: 4.01 mg/dL (02-28-25 @ 00:29)  Creatinine: 4.03 mg/dL (02-27-25 @ 09:21)  Creatinine: 4.08 mg/dL (02-27-25 @ 00:24)  Creatinine: 3.95 mg/dL (02-26-25 @ 16:13)  Creatinine: 4.03 mg/dL (02-26-25 @ 07:53)  Creatinine: 4.01 mg/dL (02-26-25 @ 00:17)  Creatinine: 4.14 mg/dL (02-25-25 @ 15:50)      LIVER FUNCTIONS - ( 04 Mar 2025 00:26 )  Alb: 2.9 g/dL / Pro: 6.8 g/dL / ALK PHOS: 120 U/L / ALT: 32 U/L / AST: 34 U/L / GGT: x           Microbiology: reviewed   Culture - Blood (collected 02-21-25 @ 19:36)  Source: .Blood Blood  Final Report (02-27-25 @ 02:01):    No growth at 5 days    Culture - Blood (collected 02-21-25 @ 19:35)  Source: .Blood Blood  Final Report (02-27-25 @ 02:01):    No growth at 5 days    Culture - Blood (collected 02-19-25 @ 00:18)  Source: .Blood Blood-Peripheral  Final Report (02-24-25 @ 03:01):    No growth at 5 days    Culture - Blood (collected 02-19-25 @ 00:18)  Source: .Blood Blood-Peripheral  Final Report (02-24-25 @ 03:01):    No growth at 5 days    Radiology: reviewed     Medications:  acetaminophen   Oral Liquid .. 650 milliGRAM(s) Oral every 6 hours PRN  albuterol/ipratropium for Nebulization 3 milliLiter(s) Nebulizer every 6 hours  artificial tears (preservative free) Ophthalmic Solution 1 Drop(s) Both EYES every 6 hours PRN  atorvastatin 20 milliGRAM(s) Oral at bedtime  chlorhexidine 0.12% Liquid 15 milliLiter(s) Oral Mucosa every 12 hours  cholecalciferol 1000 Unit(s) Oral daily  dexMEDEtomidine Infusion 0.2 MICROgram(s)/kG/Hr IV Continuous <Continuous>  escitalopram 15 milliGRAM(s) Oral with breakfast  gabapentin Solution 150 milliGRAM(s) Oral every 12 hours  heparin  Infusion. 1700 Unit(s)/Hr IV Continuous <Continuous>  influenza  Vaccine (HIGH DOSE) 0.5 milliLiter(s) IntraMuscular once  lacosamide IVPB 200 milliGRAM(s) IV Intermittent every 12 hours  lamoTRIgine 50 milliGRAM(s) Oral two times a day  levETIRAcetam   Injectable 750 milliGRAM(s) IV Push every 12 hours  lurasidone 20 milliGRAM(s) Oral at bedtime  naloxegol 12.5 milliGRAM(s) Oral daily  pantoprazole   Suspension 40 milliGRAM(s) Oral daily  polyethylene glycol 3350 17 Gram(s) Oral daily  senna 2 Tablet(s) Oral at bedtime  sevelamer carbonate Powder 1600 milliGRAM(s) Oral every 8 hours  sodium chloride 3%  Inhalation 4 milliLiter(s) Inhalation every 6 hours    Prior/Completed Antimicrobials:  piperacillin/tazobactam IVPB..  piperacillin/tazobactam IVPB..  piperacillin/tazobactam IVPB...  remdesivir  IVPB  remdesivir  IVPB  remdesivir  IVPB  vancomycin  IVPB.

## 2025-03-04 NOTE — PROGRESS NOTE ADULT - ASSESSMENT
69 year old male with a past medical history of hypertension, hyperlipemia VAZQUEZ (on CPAP at bedtime, NC 2 liters during the day), CKD stage 3, epilepsy, schizophrenia, developmental delay, metastatic testicular cancer (s/p orchiectomy, actively on chemotherapy, last dose of etoposide/cisplatin on 6/2024), presenting with acute on chronic hypoxemic respiratory failure, secondary to (a) COVID-19 infection, (b) superimposed pneumonia after recent influenza infection, and/or (c) fluid overload.     MICU consulted and accepted after RRT due to increased work of breathing    NEURO:  #Mental status:  - Sedated, however, was AAOx2 prior to increased work of breathing that required intubation  - Precedex - wean as tolerated    #Epilepsy:  - Continue with home meds which include Lacosamide, Lurasidone, Lamotrigine - increased to 50 BID  - EEG negative    #Brain Mets:  - MRI brain now with 5.4 mm enhancing lesion in the LEFT middle cerebellar peduncle with associated edema suspicious for metastases    # Schizophrenia  - Lurasidone halved to reduce oversedation    CV:  #Echo:  Recent echo last admission showing Left ventricular cavity is normal in size. Left ventricular systolic function is normal with an ejection fraction of 62 %. There are no regional wall motion abnormalities seen. Normal right ventricular cavity size and normal right ventricular systolic function.   - Repeat echo continuing to show EF 70%    #Afib w/RVR  - New onset Afib RVR (on tele, confirmed with EKG 2/19)   - Plan: Started metoprolol 5mg IVP q6 hours & Hep gtt -> metoprolol held iso hypotension  - If rate remains uncontrolled, can start Cardizem drip at 5mg/h  - c/w Heparin drip    #HTN:  - On Hydralazine 25mg TID, Amlodipine 5mg, held iso current hypotension    PULM:  #Vent   - Due to increased work of breathing, now intubated  - SBT as tolerated    #AHRF  - Patient admitted to the hospital for AHRF, found to be COVID (+)  - Likely 2/2 PNA, does not appear to CHF decompensation  - Continue with airway clearance regimen - duoneb, hypertonic saline, chest PT  - CT chest with b/l GGOs, possible early COVID fibrosis, area of consolidation ?infectious vs. atelectasis    RENAL:  #SHAGUFTA:   - Hx of CKD stage 3, Cr 2.38 at baseline, seems to be plateauing at 4.24  - In setting of COVID (+)  - Pre-renal, FeUrea 29%  - Monitor Cr  - Avoid nephrotoxic meds  - Wakefield in place  - Fluid challenge - albumin and blood given    GI:  No acute issues     #Diet: Tube feeds via OG tube  #Bowel Regimen  - On Senna and Miralax    ENDO:  No acute issues  Thyroid nodule noted in prior notes    HEMATOLOGIC:  #Thrombocytopenic  - Unclear origin  - Concern for possible mets to bone marrow? vs due to infection  - Continue to monitor  - Transfuse to maintain Plt>10K unless actively bleeding then maintain >50K  - Heme-Onc recs appreciated    #Anemia  - CKD vs. cancer vs. sepsis  - 1 unit PRBCs given  - CTM CBC  - No sign of bleeding    #DVT prophylaxis   - SCD    ID:  #COVID-19  - Concern for possible superimposed PNA   - S/p zosyn 2/16- 2/28  - S/p Remdesivir  - Blood cx negative    SKIN:  #Lines:  2x PIV    Ethics:  - Full Code  - Contact: Sister Ester [phone]  69 year old male with a past medical history of hypertension, hyperlipemia VAZQUEZ (on CPAP at bedtime, NC 2 liters during the day), CKD stage 3, epilepsy, schizophrenia, developmental delay, metastatic testicular cancer (s/p orchiectomy, actively on chemotherapy, last dose of etoposide/cisplatin on 6/2024), presenting with acute on chronic hypoxemic respiratory failure, secondary to (a) COVID-19 infection, (b) superimposed pneumonia after recent influenza infection, and/or (c) fluid overload.     MICU consulted and accepted after RRT due to increased work of breathing    NEURO:  #Mental status:  - Off precedex  - Baseline A&O2-3    #Epilepsy:  - Continue with home meds which include Lacosamide, Lurasidone, Lamotrigine - increased to 50 BID  - EEG negative    #Brain Mets:  - MRI brain now with 5.4 mm enhancing lesion in the LEFT middle cerebellar peduncle with associated edema suspicious for metastases    # Schizophrenia  - Lurasidone halved to reduce oversedation    CV:  #Echo:  Recent echo last admission showing Left ventricular cavity is normal in size. Left ventricular systolic function is normal with an ejection fraction of 62 %. There are no regional wall motion abnormalities seen. Normal right ventricular cavity size and normal right ventricular systolic function.   - Repeat echo continuing to show EF 70%    #Afib w/RVR  - New onset Afib RVR (on tele, confirmed with EKG 2/19)   - Plan: Started metoprolol 5mg IVP q6 hours & Hep gtt -> metoprolol held iso hypotension  - If rate remains uncontrolled, can start Cardizem drip at 5mg/h  - c/w Heparin drip    #HTN:  - On Hydralazine 25mg TID, Amlodipine 5mg, held iso current hypotension    PULM:  #Vent   - Due to increased work of breathing, now intubated  - SBT as tolerated  - Possible extubation in the next day or so if secretions managaable    #AHRF  - Patient admitted to the hospital for AHRF, found to be COVID (+)  - Likely 2/2 PNA, does not appear to CHF decompensation  - Continue with airway clearance regimen - duoneb, hypertonic saline, chest PT  - CT chest with b/l GGOs, possible early COVID fibrosis, area of consolidation ?infectious vs. atelectasis    RENAL:  #SHAGUFTA:   - Hx of CKD stage 3, Cr 2.38 at baseline, seems to be plateauing at 4.24  - In setting of COVID (+)  - Pre-renal, FeUrea 29%  - Monitor Cr  - Avoid nephrotoxic meds  - Wakefield in place  - Fluid challenge - albumin and blood given with improvement in renal function, will start on 50 cc LR    GI:  No acute issues     #Diet: Tube feeds via OG tube  #Bowel Regimen  - On Senna and Miralax    ENDO:  No acute issues  Thyroid nodule noted in prior notes    HEMATOLOGIC:  #Thrombocytopenic  - Unclear origin  - Concern for possible mets to bone marrow? vs due to infection  - Continue to monitor  - Transfuse to maintain Plt>10K unless actively bleeding then maintain >50K  - Heme-Onc recs appreciated    #Anemia  - CKD vs. cancer vs. sepsis  - 1 unit PRBCs given  - CTM CBC  - No sign of bleeding    #DVT prophylaxis   - SCD    ID:  #COVID-19  - Concern for possible superimposed PNA   - S/p zosyn 2/16- 2/28  - S/p Remdesivir  - Blood cx negative    SKIN:  #Lines:  2x PIV    Ethics:  - Full Code  - Contact: Sister Ester [phone]

## 2025-03-04 NOTE — PROGRESS NOTE ADULT - SUBJECTIVE AND OBJECTIVE BOX
*******************************  Navya Michelle PGY-3  *******************************    INTERVAL HPI/OVERNIGHT EVENTS: No acute events overnight, was maintained on full support.     SUBJECTIVE: Patient seen and examined at bedside.     OBJECTIVE:    VITAL SIGNS:  ICU Vital Signs Last 24 Hrs  T(C): 37.3 (04 Mar 2025 04:00), Max: 37.3 (04 Mar 2025 00:00)  T(F): 99.1 (04 Mar 2025 04:00), Max: 99.1 (04 Mar 2025 00:00)  HR: 62 (04 Mar 2025 07:00) (60 - 99)  BP: 145/61 (04 Mar 2025 07:00) (124/57 - 176/81)  BP(mean): 88 (04 Mar 2025 07:00) (82 - 116)  ABP: --  ABP(mean): --  RR: 25 (04 Mar 2025 07:00) (16 - 25)  SpO2: 99% (04 Mar 2025 07:00) (96% - 100%)    O2 Parameters below as of 04 Mar 2025 05:30  Patient On (Oxygen Delivery Method): ventilator          Mode: AC/ CMV (Assist Control/ Continuous Mandatory Ventilation), RR (machine): 16, TV (machine): 450, FiO2: 30, PEEP: 6, ITime: 0.72, MAP: 10, PIP: 25    03-03 @ 07:01  -  03-04 @ 07:00  --------------------------------------------------------  IN: 2764.7 mL / OUT: 1155 mL / NET: 1609.7 mL      CAPILLARY BLOOD GLUCOSE      POCT Blood Glucose.: 162 mg/dL (04 Mar 2025 05:46)        PHYSICAL EXAM:  GENERAL: Intubated  HEENT: NCAT  NECK: supple without JVD  CHEST/LUNG: mechanical breath sounds bilaterally  CARDIOVASCULAR: regular rate and rhythm, normal S1 and S2, no murmur/rub/gallop  ABDOMEN: positive bowel sounds, non-distended, no organomegaly   EXTREMITIES: no lower extremity edema, b/l upper extremities edematous, though improved in appearance  NEUROLOGY:     MEDICATIONS:  MEDICATIONS  (STANDING):  albuterol/ipratropium for Nebulization 3 milliLiter(s) Nebulizer every 6 hours  atorvastatin 20 milliGRAM(s) Oral at bedtime  chlorhexidine 0.12% Liquid 15 milliLiter(s) Oral Mucosa every 12 hours  cholecalciferol 1000 Unit(s) Oral daily  dexMEDEtomidine Infusion 0.2 MICROgram(s)/kG/Hr (4.46 mL/Hr) IV Continuous <Continuous>  escitalopram 15 milliGRAM(s) Oral with breakfast  gabapentin Solution 150 milliGRAM(s) Oral every 12 hours  heparin  Infusion. 1700 Unit(s)/Hr (17 mL/Hr) IV Continuous <Continuous>  influenza  Vaccine (HIGH DOSE) 0.5 milliLiter(s) IntraMuscular once  lacosamide IVPB 200 milliGRAM(s) IV Intermittent every 12 hours  lamoTRIgine 50 milliGRAM(s) Oral two times a day  levETIRAcetam   Injectable 750 milliGRAM(s) IV Push every 12 hours  lurasidone 20 milliGRAM(s) Oral at bedtime  naloxegol 12.5 milliGRAM(s) Oral daily  pantoprazole   Suspension 40 milliGRAM(s) Oral daily  polyethylene glycol 3350 17 Gram(s) Oral daily  senna 2 Tablet(s) Oral at bedtime  sevelamer carbonate Powder 1600 milliGRAM(s) Oral every 8 hours  sodium chloride 3%  Inhalation 4 milliLiter(s) Inhalation every 6 hours    MEDICATIONS  (PRN):  acetaminophen   Oral Liquid .. 650 milliGRAM(s) Oral every 6 hours PRN Mild Pain (1 - 3)  artificial tears (preservative free) Ophthalmic Solution 1 Drop(s) Both EYES every 6 hours PRN Dry Eyes      ALLERGIES:  Allergies    seasonal allergies (Unknown)  penicillin (Hives)    Intolerances    latex (Rash)      LABS:                        7.3    8.48  )-----------( 109      ( 04 Mar 2025 00:40 )             22.8     03-04    140  |  96  |  126[H]  ----------------------------<  146[H]  4.0   |  23  |  3.99[H]    Ca    9.1      04 Mar 2025 00:26  Phos  9.1     03-04  Mg     2.9     03-04    TPro  6.8  /  Alb  2.9[L]  /  TBili  0.3  /  DBili  x   /  AST  34  /  ALT  32  /  AlkPhos  120  03-04    PT/INR - ( 04 Mar 2025 00:29 )   PT: 11.1 sec;   INR: 0.96 ratio         PTT - ( 04 Mar 2025 00:29 )  PTT:69.7 sec  Urinalysis Basic - ( 04 Mar 2025 00:26 )    Color: x / Appearance: x / SG: x / pH: x  Gluc: 146 mg/dL / Ketone: x  / Bili: x / Urobili: x   Blood: x / Protein: x / Nitrite: x   Leuk Esterase: x / RBC: x / WBC x   Sq Epi: x / Non Sq Epi: x / Bacteria: x        RADIOLOGY & ADDITIONAL TESTS: Reviewed.     *******************************  Navya Michelle PGY-3  *******************************    INTERVAL HPI/OVERNIGHT EVENTS: No acute events overnight, was maintained on full support.     SUBJECTIVE: Patient seen and examined at bedside. Denies pain/discomfort/shortness of breath.     OBJECTIVE:    VITAL SIGNS:  ICU Vital Signs Last 24 Hrs  T(C): 37.3 (04 Mar 2025 04:00), Max: 37.3 (04 Mar 2025 00:00)  T(F): 99.1 (04 Mar 2025 04:00), Max: 99.1 (04 Mar 2025 00:00)  HR: 62 (04 Mar 2025 07:00) (60 - 99)  BP: 145/61 (04 Mar 2025 07:00) (124/57 - 176/81)  BP(mean): 88 (04 Mar 2025 07:00) (82 - 116)  ABP: --  ABP(mean): --  RR: 25 (04 Mar 2025 07:00) (16 - 25)  SpO2: 99% (04 Mar 2025 07:00) (96% - 100%)    O2 Parameters below as of 04 Mar 2025 05:30  Patient On (Oxygen Delivery Method): ventilator          Mode: AC/ CMV (Assist Control/ Continuous Mandatory Ventilation), RR (machine): 16, TV (machine): 450, FiO2: 30, PEEP: 6, ITime: 0.72, MAP: 10, PIP: 25    03-03 @ 07:01  -  03-04 @ 07:00  --------------------------------------------------------  IN: 2764.7 mL / OUT: 1155 mL / NET: 1609.7 mL      CAPILLARY BLOOD GLUCOSE      POCT Blood Glucose.: 162 mg/dL (04 Mar 2025 05:46)        PHYSICAL EXAM:  GENERAL: Intubated, opens eyes, tracks, follows commands. Able to nod/shake head to some questions  HEENT: NCAT  NECK: supple without JVD  CHEST/LUNG: mechanical breath sounds bilaterally  CARDIOVASCULAR: regular rate and rhythm, normal S1 and S2, no murmur/rub/gallop  ABDOMEN: positive bowel sounds, non-distended, no organomegaly   EXTREMITIES: no lower extremity edema, b/l upper extremities edematous, though improved in appearance  NEUROLOGY: Able to nod/shake head, follow commands with all extremities (though RUE weaker)    MEDICATIONS:  MEDICATIONS  (STANDING):  albuterol/ipratropium for Nebulization 3 milliLiter(s) Nebulizer every 6 hours  atorvastatin 20 milliGRAM(s) Oral at bedtime  chlorhexidine 0.12% Liquid 15 milliLiter(s) Oral Mucosa every 12 hours  cholecalciferol 1000 Unit(s) Oral daily  dexMEDEtomidine Infusion 0.2 MICROgram(s)/kG/Hr (4.46 mL/Hr) IV Continuous <Continuous>  escitalopram 15 milliGRAM(s) Oral with breakfast  gabapentin Solution 150 milliGRAM(s) Oral every 12 hours  heparin  Infusion. 1700 Unit(s)/Hr (17 mL/Hr) IV Continuous <Continuous>  influenza  Vaccine (HIGH DOSE) 0.5 milliLiter(s) IntraMuscular once  lacosamide IVPB 200 milliGRAM(s) IV Intermittent every 12 hours  lamoTRIgine 50 milliGRAM(s) Oral two times a day  levETIRAcetam   Injectable 750 milliGRAM(s) IV Push every 12 hours  lurasidone 20 milliGRAM(s) Oral at bedtime  naloxegol 12.5 milliGRAM(s) Oral daily  pantoprazole   Suspension 40 milliGRAM(s) Oral daily  polyethylene glycol 3350 17 Gram(s) Oral daily  senna 2 Tablet(s) Oral at bedtime  sevelamer carbonate Powder 1600 milliGRAM(s) Oral every 8 hours  sodium chloride 3%  Inhalation 4 milliLiter(s) Inhalation every 6 hours    MEDICATIONS  (PRN):  acetaminophen   Oral Liquid .. 650 milliGRAM(s) Oral every 6 hours PRN Mild Pain (1 - 3)  artificial tears (preservative free) Ophthalmic Solution 1 Drop(s) Both EYES every 6 hours PRN Dry Eyes      ALLERGIES:  Allergies    seasonal allergies (Unknown)  penicillin (Hives)    Intolerances    latex (Rash)      LABS:                        7.3    8.48  )-----------( 109      ( 04 Mar 2025 00:40 )             22.8     03-04    140  |  96  |  126[H]  ----------------------------<  146[H]  4.0   |  23  |  3.99[H]    Ca    9.1      04 Mar 2025 00:26  Phos  9.1     03-04  Mg     2.9     03-04    TPro  6.8  /  Alb  2.9[L]  /  TBili  0.3  /  DBili  x   /  AST  34  /  ALT  32  /  AlkPhos  120  03-04    PT/INR - ( 04 Mar 2025 00:29 )   PT: 11.1 sec;   INR: 0.96 ratio         PTT - ( 04 Mar 2025 00:29 )  PTT:69.7 sec  Urinalysis Basic - ( 04 Mar 2025 00:26 )    Color: x / Appearance: x / SG: x / pH: x  Gluc: 146 mg/dL / Ketone: x  / Bili: x / Urobili: x   Blood: x / Protein: x / Nitrite: x   Leuk Esterase: x / RBC: x / WBC x   Sq Epi: x / Non Sq Epi: x / Bacteria: x        RADIOLOGY & ADDITIONAL TESTS: Reviewed.

## 2025-03-04 NOTE — PROGRESS NOTE ADULT - ASSESSMENT
69 year old male with a past medical history of hypertension, hyperlipemia VAZQUEZ (on CPAP at bedtime, NC 2 liters during the day), CKD stage 3, epilepsy, schizophrenia, developmental delay, metastatic testicular cancer (s/p orchiectomy, actively on chemotherapy, last dose of etoposide/cisplatin on 6/2024) presenting with worsening shortness of breath. Patient was recently hospitalized at [facility] from January 27th 2025 to February 6th 2025 for seizure and influenza virus infection, which required intubation. He was sent to rehab (originally from home) from hospital. He returns due to sudden onset shortness of breath and worsening oxygenation. Of note, he tested positive for COVID-19       1- SHAGUFTA on CKDIII  2- covid-19  3- anemia  4- hypotension  bp is better   5- respiratory failure         SHAGUFTA suspected in setting of new infection. covid 19   cr and bun high but seem to be stabilizing after iv alb   also with worsened anemia when seen earlier  hold diuretics   bun is quite high cr is slightly better re eval in am   hyperphosphatemia   renvela 1600 mg tid feeding tube   anemia, trend hgb one unit prbc if hb lower again   vent wean

## 2025-03-04 NOTE — PROGRESS NOTE ADULT - SUBJECTIVE AND OBJECTIVE BOX
SUBJECTIVE/ OVERNIGHT EVENTS:  Remain under ICU care  intubated.  --------------------------------------------------------------------------------------------  LABS:                        7.3    8.48  )-----------( 109      ( 04 Mar 2025 00:40 )             22.8     03-04    140  |  96  |  126[H]  ----------------------------<  146[H]  4.0   |  23  |  3.99[H]    Ca    9.1      04 Mar 2025 00:26  Phos  9.1     03-04  Mg     2.9     03-04    TPro  6.8  /  Alb  2.9[L]  /  TBili  0.3  /  DBili  x   /  AST  34  /  ALT  32  /  AlkPhos  120  03-04    PT/INR - ( 04 Mar 2025 00:29 )   PT: 11.1 sec;   INR: 0.96 ratio         PTT - ( 04 Mar 2025 00:29 )  PTT:69.7 sec  CAPILLARY BLOOD GLUCOSE      POCT Blood Glucose.: 162 mg/dL (04 Mar 2025 05:46)  POCT Blood Glucose.: 126 mg/dL (03 Mar 2025 12:30)        Urinalysis Basic - ( 04 Mar 2025 00:26 )    Color: x / Appearance: x / SG: x / pH: x  Gluc: 146 mg/dL / Ketone: x  / Bili: x / Urobili: x   Blood: x / Protein: x / Nitrite: x   Leuk Esterase: x / RBC: x / WBC x   Sq Epi: x / Non Sq Epi: x / Bacteria: x        RADIOLOGY & ADDITIONAL TESTS:    Imaging Personally Reviewed:  [x] YES  [ ] NO    Consultant(s) Notes Reviewed:  [x] YES  [ ] NO    MEDICATIONS  (STANDING):  albuterol/ipratropium for Nebulization 3 milliLiter(s) Nebulizer every 6 hours  atorvastatin 20 milliGRAM(s) Oral at bedtime  chlorhexidine 0.12% Liquid 15 milliLiter(s) Oral Mucosa every 12 hours  cholecalciferol 1000 Unit(s) Oral daily  dexMEDEtomidine Infusion 0.2 MICROgram(s)/kG/Hr (4.46 mL/Hr) IV Continuous <Continuous>  escitalopram 15 milliGRAM(s) Oral with breakfast  gabapentin Solution 150 milliGRAM(s) Oral every 12 hours  heparin  Infusion. 1700 Unit(s)/Hr (17 mL/Hr) IV Continuous <Continuous>  influenza  Vaccine (HIGH DOSE) 0.5 milliLiter(s) IntraMuscular once  lacosamide IVPB 200 milliGRAM(s) IV Intermittent every 12 hours  lactated ringers. 1000 milliLiter(s) (50 mL/Hr) IV Continuous <Continuous>  lamoTRIgine 50 milliGRAM(s) Oral two times a day  levETIRAcetam   Injectable 750 milliGRAM(s) IV Push every 12 hours  lurasidone 20 milliGRAM(s) Oral at bedtime  naloxegol 12.5 milliGRAM(s) Oral daily  pantoprazole   Suspension 40 milliGRAM(s) Oral daily  polyethylene glycol 3350 17 Gram(s) Oral daily  senna 2 Tablet(s) Oral at bedtime  sevelamer carbonate Powder 1600 milliGRAM(s) Oral every 8 hours  sodium chloride 3%  Inhalation 4 milliLiter(s) Inhalation every 6 hours    MEDICATIONS  (PRN):  acetaminophen   Oral Liquid .. 650 milliGRAM(s) Oral every 6 hours PRN Mild Pain (1 - 3)  artificial tears (preservative free) Ophthalmic Solution 1 Drop(s) Both EYES every 6 hours PRN Dry Eyes      Care Discussed with Consultants/Other Providers [x] YES  [ ] NO    Vital Signs Last 24 Hrs  T(C): 37.3 (04 Mar 2025 04:00), Max: 37.3 (04 Mar 2025 00:00)  T(F): 99.1 (04 Mar 2025 04:00), Max: 99.1 (04 Mar 2025 00:00)  HR: 70 (04 Mar 2025 09:04) (62 - 99)  BP: 145/61 (04 Mar 2025 07:00) (127/59 - 176/81)  BP(mean): 88 (04 Mar 2025 07:00) (84 - 116)  RR: 25 (04 Mar 2025 07:00) (16 - 25)  SpO2: 99% (04 Mar 2025 09:04) (96% - 100%)    Parameters below as of 04 Mar 2025 05:30  Patient On (Oxygen Delivery Method): ventilator      I&O's Summary    03 Mar 2025 07:01  -  04 Mar 2025 07:00  --------------------------------------------------------  IN: 2764.7 mL / OUT: 1155 mL / NET: 1609.7 mL        PHYSICAL EXAM:  GENERAL: intubated, on sedation vacation.   HEAD:  Atraumatic, Normocephalic  EYES: EOMI, PERRLA, conjunctiva and sclera clear  NECK: Supple, No JVD  CHEST/LUNG: mild decrease breath sounds bilaterally; No wheeze   HEART: Regular rate and rhythm; No murmurs, rubs, or gallops  ABDOMEN: Soft, Nontender, Nondistended; Bowel sounds present  Neuro:  intubated, on sedation vacation  EXTREMITIES:  2+ Peripheral Pulses, No clubbing, cyanosis, or edema  SKIN: No rashes or lesions

## 2025-03-04 NOTE — PROGRESS NOTE ADULT - ASSESSMENT
Patient is a 69 year old male with PMH of HTN, HLD, VAZQUEZ (on CPAP at bedtime, NC 2 liters during the day), CKD3, epilepsy, schizophrenia, developmental delay, metastatic testicular cancer (s/p orchiectomy, actively on chemotherapy, last dose of etoposide/cisplatin on 6/2024) presenting with worsening shortness of breath. Patient was recently hospitalized at [facility] from January 27th 2025 to February 6th 2025 for seizure and influenza virus infection, which required intubation. He was sent to rehab (originally from home) from hospital and now returns due to sudden onset shortness of breath and worsening oxygenation. Of note, he tested positive for COVID-19 at facility on 2/13/25 and was not put on any COVID-19 treatment at the time, only guaifenesin and scopolamine patch. Patient was placed on non-rebreather and sent to the hospital on 2/16/25. Noted initial discussion with patient/family and they decided to hold off on remdesivir given LFTs and SHAGUFTA.     Acute on chronic hypoxic respiratory failure  COVID-19 pneumonia, superimposed bacterial pneumonia  Acute on chronic HFpEF  Klebsiella UTI, treated   SHAGUFTA on CKD  Course c/b severe sepsis, hypotension, SHAGUFTA, likely due to aspiration pneumonia   - CT chest with extensive b/l ggo c/w COVID pneumonia; tracheomalacia   - MRSA PCR screen negative, s/p vancomycin   - 2/18 worsening resp status on NC requiring AVAPS, started on remdesivir   - 2/22 completed remdesivir x5d course   - 2/23 s/p RRT for inc WOB, s/p intubation, suspected aspiration   - 2/24 intubated, on sedation, pressor support, SHAGUFTA, WBC wnl   - 2/25 afebrile, off pressor, WBC wnl, Cr stable  - 2/26 CT chest with improved upper lobe ggo, new/worsening confluent areas of consolidation in mid-lower lungs   - 2/26 completed dexamethasone x10d  - 2/28 completed zosyn     Recommendations:  Continue to monitor off antibiotics   Nephrology following, monitor Cr   Monitor temps/WBC  Supportive care  Aspiration precautions  Continue rest of care per primary team       Greyson Mart M.D.  [facility]  Available on [employer] TEAMS - *PREFERRED*  [phone]  After 5pm on weekdays and all day on weekends - please call [phone]     Thank you for consulting us and involving us in the management of this patients case. In addition to reviewing history, imaging, documents, labs, microbiology, took into account antibiotic stewardship, local antibiogram and infection control strategies and potential transmission issues.

## 2025-03-04 NOTE — PROGRESS NOTE ADULT - PROBLEM SELECTOR PLAN 1
? superimposed PNA on top of COVID-19?  - Zosyn 4.5 g q12h, renally dosed, completed  - Vancomycin discontinued  - Decadron 6mg IVPB daily x 10 days for COVID-19   - completed Remdesivir IV   - Maintain O2 sats above 92%  - BiPaP daytime prn   - BiPaP at bedtime   - Blood cultures x 2 (-) so far  - MRSA swab negative  - Sputum culture --> few gram variable rods and rare G (+) cocci in pairs  - Legionella Ur Ag (-)  - Streptococcus pneumoniae neg  - Appreciate pulmonology  - s/p bumex gtt per ICU team, monitor urine output  - repeat CT chest reviewed: upper lobe groundglass opacities have improved, there are new and worsening confluent areas of consolidation/pneumonia in the mid to lower lungs  - ID follow up appreciated, completed abx course.

## 2025-03-04 NOTE — PROGRESS NOTE ADULT - SUBJECTIVE AND OBJECTIVE BOX
Date of Service: 03-04-25 @ 15:42    Patient is a 69y old  Male who presents with a chief complaint of Acute on chronic hypoxemic respiratory failure (04 Mar 2025 09:34)      Any change in ROS: pt seems OK:  on weaning trials:  mentally he looks  more alert and awake today ;   no resp distress     MEDICATIONS  (STANDING):  albuterol/ipratropium for Nebulization 3 milliLiter(s) Nebulizer every 6 hours  atorvastatin 20 milliGRAM(s) Oral at bedtime  cholecalciferol 1000 Unit(s) Oral daily  dexMEDEtomidine Infusion 0.2 MICROgram(s)/kG/Hr (4.46 mL/Hr) IV Continuous <Continuous>  erythromycin   Ointment 1 Application(s) Both EYES four times a day  escitalopram 15 milliGRAM(s) Oral with breakfast  gabapentin Solution 150 milliGRAM(s) Oral every 12 hours  heparin  Infusion. 1700 Unit(s)/Hr (17 mL/Hr) IV Continuous <Continuous>  influenza  Vaccine (HIGH DOSE) 0.5 milliLiter(s) IntraMuscular once  lacosamide IVPB 200 milliGRAM(s) IV Intermittent every 12 hours  lactated ringers. 1000 milliLiter(s) (50 mL/Hr) IV Continuous <Continuous>  lamoTRIgine 50 milliGRAM(s) Oral two times a day  levETIRAcetam   Injectable 750 milliGRAM(s) IV Push every 12 hours  lurasidone 20 milliGRAM(s) Oral at bedtime  naloxegol 12.5 milliGRAM(s) Oral daily  pantoprazole   Suspension 40 milliGRAM(s) Oral daily  polyethylene glycol 3350 17 Gram(s) Oral daily  senna 2 Tablet(s) Oral at bedtime  sevelamer carbonate Powder 1600 milliGRAM(s) Oral every 8 hours  sodium chloride 3%  Inhalation 4 milliLiter(s) Inhalation every 6 hours    MEDICATIONS  (PRN):  acetaminophen   Oral Liquid .. 650 milliGRAM(s) Oral every 6 hours PRN Mild Pain (1 - 3)  artificial tears (preservative free) Ophthalmic Solution 1 Drop(s) Both EYES every 6 hours PRN Dry Eyes    Vital Signs Last 24 Hrs  T(C): 36.9 (04 Mar 2025 12:00), Max: 37.3 (04 Mar 2025 00:00)  T(F): 98.4 (04 Mar 2025 12:00), Max: 99.1 (04 Mar 2025 00:00)  HR: 72 (04 Mar 2025 14:54) (62 - 99)  BP: 165/69 (04 Mar 2025 14:54) (127/59 - 177/79)  BP(mean): 99 (04 Mar 2025 14:54) (84 - 116)  RR: 36 (04 Mar 2025 14:00) (16 - 36)  SpO2: 100% (04 Mar 2025 14:54) (96% - 100%)    Parameters below as of 04 Mar 2025 15:15      O2 Concentration (%): 40  Mode: standby    I&O's Summary    03 Mar 2025 07:01  -  04 Mar 2025 07:00  --------------------------------------------------------  IN: 2764.7 mL / OUT: 1205 mL / NET: 1559.7 mL    04 Mar 2025 07:01  -  04 Mar 2025 15:42  --------------------------------------------------------  IN: 614 mL / OUT: 385 mL / NET: 229 mL          Physical Exam:   GENERAL: NAD, well-groomed, well-developed  HEENT: BREE/   Atraumatic, Normocephalic  ENMT: No tonsillar erythema, exudates, or enlargement; Moist mucous membranes, Good dentition, No lesions  NECK: Supple, No JVD, Normal thyroid  CHEST/LUNG: Clear to auscultaion  CVS: Regular rate and rhythm; No murmurs, rubs, or gallops  GI: : Soft, Nontender, Nondistended; Bowel sounds present  NERVOUS SYSTEM:  open his eyes:  and doing  ok ;  no sob:    EXTREMITIES:  -mild edema  LYMPH: No lymphadenopathy noted  SKIN: No rashes or lesions  ENDOCRINOLOGY: No Thyromegaly  PSYCH: calm     Labs:  ABG - ( 04 Mar 2025 10:10 )  pH, Arterial: 7.37  pH, Blood: x     /  pCO2: 47    /  pO2: 95    / HCO3: 27    / Base Excess: 1.6   /  SaO2: 98.7            40.0                            7.3    8.48  )-----------( 109      ( 04 Mar 2025 00:40 )             22.8                         7.4    7.06  )-----------( 115      ( 03 Mar 2025 12:41 )             22.5                         6.8    7.14  )-----------( 108      ( 03 Mar 2025 00:57 )             21.9                         5.5    8.02  )-----------( 117      ( 03 Mar 2025 00:01 )             18.0                         7.0    9.10  )-----------( 112      ( 02 Mar 2025 12:01 )             22.4                         7.7    9.10  )-----------( 116      ( 02 Mar 2025 03:49 )             24.3                         7.4    7.36  )-----------( 101      ( 01 Mar 2025 00:50 )             23.3     03-04    140  |  96  |  126[H]  ----------------------------<  146[H]  4.0   |  23  |  3.99[H]  03-03    137  |  94[L]  |  121[H]  ----------------------------<  156[H]  4.2   |  22  |  4.34[H]  03-02    139  |  95[L]  |  115[H]  ----------------------------<  145[H]  3.7   |  22  |  4.06[H]  03-02    139  |  95[L]  |  117[H]  ----------------------------<  165[H]  3.4[L]   |  22  |  4.11[H]  03-01    139  |  94[L]  |  104[H]  ----------------------------<  137[H]  3.6   |  22  |  4.28[H]  03-01    139  |  94[L]  |  100[H]  ----------------------------<  158[H]  3.7   |  21[L]  |  4.24[H]    Ca    9.1      04 Mar 2025 00:26  Ca    9.3      03 Mar 2025 00:01  Phos  9.1     03-04  Phos  9.9     03-03  Mg     2.9     03-04  Mg     2.9     03-03    TPro  6.8  /  Alb  2.9[L]  /  TBili  0.3  /  DBili  x   /  AST  34  /  ALT  32  /  AlkPhos  120  03-04  TPro  7.3  /  Alb  2.7[L]  /  TBili  0.3  /  DBili  x   /  AST  35  /  ALT  37  /  AlkPhos  131[H]  03-03  TPro  7.5  /  Alb  2.9[L]  /  TBili  0.3  /  DBili  x   /  AST  45[H]  /  ALT  47[H]  /  AlkPhos  152[H]  03-02  TPro  7.4  /  Alb  2.8[L]  /  TBili  0.3  /  DBili  x   /  AST  37  /  ALT  47[H]  /  AlkPhos  148[H]  03-01    CAPILLARY BLOOD GLUCOSE      POCT Blood Glucose.: 162 mg/dL (04 Mar 2025 05:46)      LIVER FUNCTIONS - ( 04 Mar 2025 00:26 )  Alb: 2.9 g/dL / Pro: 6.8 g/dL / ALK PHOS: 120 U/L / ALT: 32 U/L / AST: 34 U/L / GGT: x           PT/INR - ( 04 Mar 2025 00:29 )   PT: 11.1 sec;   INR: 0.96 ratio         PTT - ( 04 Mar 2025 00:29 )  PTT:69.7 sec  Urinalysis Basic - ( 04 Mar 2025 00:26 )    Color: x / Appearance: x / SG: x / pH: x  Gluc: 146 mg/dL / Ketone: x  / Bili: x / Urobili: x   Blood: x / Protein: x / Nitrite: x   Leuk Esterase: x / RBC: x / WBC x   Sq Epi: x / Non Sq Epi: x / Bacteria: x        rad< from: CT Chest No Cont (02.26.25 @ 22:32) >  Since 2/17/2025, while the upper lobe groundglass opacities have   improved, there are new and worsening confluent areas of   consolidation/pneumonia in the mid to lower lungs.        --- End of Report ---          INESSA DAVID DO; Resident Radiologist  This document has been electronically signed.  SALONI CUELLAR M.D., Attending Radiologist  This document has been electronically signed. Feb 27 2025 12:10PM    < end of copied text >      RECENT CULTURES:        RESPIRATORY CULTURES:          Studies  Chest X-RAY  CT SCAN Chest   Venous Dopplers: LE:   CT Abdomen  Others

## 2025-03-04 NOTE — PROGRESS NOTE ADULT - SUBJECTIVE AND OBJECTIVE BOX
[facility] INPATIENT PROGRESS NOTE     Interval Hx:   03-04-25: Mr. Gr was seen at bedside today.    Meds:   MEDICATIONS  (STANDING):  albuterol/ipratropium for Nebulization 3 milliLiter(s) Nebulizer every 6 hours  atorvastatin 20 milliGRAM(s) Oral at bedtime  chlorhexidine 0.12% Liquid 15 milliLiter(s) Oral Mucosa every 12 hours  cholecalciferol 1000 Unit(s) Oral daily  dexMEDEtomidine Infusion 0.2 MICROgram(s)/kG/Hr (4.46 mL/Hr) IV Continuous <Continuous>  escitalopram 15 milliGRAM(s) Oral with breakfast  gabapentin Solution 150 milliGRAM(s) Oral every 12 hours  heparin  Infusion. 1700 Unit(s)/Hr (17 mL/Hr) IV Continuous <Continuous>  influenza  Vaccine (HIGH DOSE) 0.5 milliLiter(s) IntraMuscular once  lacosamide IVPB 200 milliGRAM(s) IV Intermittent every 12 hours  lamoTRIgine 50 milliGRAM(s) Oral two times a day  levETIRAcetam   Injectable 750 milliGRAM(s) IV Push every 12 hours  lurasidone 20 milliGRAM(s) Oral at bedtime  naloxegol 12.5 milliGRAM(s) Oral daily  pantoprazole   Suspension 40 milliGRAM(s) Oral daily  polyethylene glycol 3350 17 Gram(s) Oral daily  senna 2 Tablet(s) Oral at bedtime  sevelamer carbonate Powder 1600 milliGRAM(s) Oral every 8 hours  sodium chloride 3%  Inhalation 4 milliLiter(s) Inhalation every 6 hours    MEDICATIONS  (PRN):  acetaminophen   Oral Liquid .. 650 milliGRAM(s) Oral every 6 hours PRN Mild Pain (1 - 3)  artificial tears (preservative free) Ophthalmic Solution 1 Drop(s) Both EYES every 6 hours PRN Dry Eyes    Vital Signs Last 24 Hrs  T(C): 37.3 (04 Mar 2025 00:00), Max: 37.3 (04 Mar 2025 00:00)  T(F): 99.1 (04 Mar 2025 00:00), Max: 99.1 (04 Mar 2025 00:00)  HR: 68 (04 Mar 2025 03:41) (60 - 99)  BP: 131/62 (04 Mar 2025 02:00) (116/57 - 176/81)  BP(mean): 89 (04 Mar 2025 02:00) (79 - 116)  RR: 20 (04 Mar 2025 02:00) (16 - 25)  SpO2: 99% (04 Mar 2025 03:41) (96% - 100%)    Parameters below as of 03 Mar 2025 23:20  Patient On (Oxygen Delivery Method): ventilator    Physical Exam:  Gen: Intubated  HEENT: EOMI, MMM  Chest: equal chest rise  Cardiac: regular   Abd: non distended   Neuro: sedated    Labs:                        7.3    8.48  )-----------( 109      ( 04 Mar 2025 00:40 )             22.8     CBC Full  -  ( 04 Mar 2025 00:40 )  WBC Count : 8.48 K/uL  RBC Count : 2.25 M/uL  Hemoglobin : 7.3 g/dL  Hematocrit : 22.8 %  Platelet Count - Automated : 109 K/uL  Mean Cell Volume : 101.3 fl  Mean Cell Hemoglobin : 32.4 pg  Mean Cell Hemoglobin Concentration : 32.0 g/dL    03-04    140  |  96  |  126[H]  ----------------------------<  146[H]  4.0   |  23  |  3.99[H]    Ca    9.1      04 Mar 2025 00:26  Phos  9.1     03-04  Mg     2.9     03-04    TPro  6.8  /  Alb  2.9[L]  /  TBili  0.3  /  DBili  x   /  AST  34  /  ALT  32  /  AlkPhos  120  03-04    PT/INR - ( 04 Mar 2025 00:29 )   PT: 11.1 sec;   INR: 0.96 ratio         PTT - ( 04 Mar 2025 00:29 )  PTT:69.7 sec  Bilirubin Total: 0.3 mg/dL (03-04-25 @ 00:26)   [facility] INPATIENT PROGRESS NOTE     Interval Hx:   03-04-25: Mr. Gr was seen at bedside today, awake, moving arms spontaneously on exam, continues without much interaction however. No signs of bleeding, continues heparin drip, continues intubated in MICU     Meds:   MEDICATIONS  (STANDING):  albuterol/ipratropium for Nebulization 3 milliLiter(s) Nebulizer every 6 hours  atorvastatin 20 milliGRAM(s) Oral at bedtime  chlorhexidine 0.12% Liquid 15 milliLiter(s) Oral Mucosa every 12 hours  cholecalciferol 1000 Unit(s) Oral daily  dexMEDEtomidine Infusion 0.2 MICROgram(s)/kG/Hr (4.46 mL/Hr) IV Continuous <Continuous>  escitalopram 15 milliGRAM(s) Oral with breakfast  gabapentin Solution 150 milliGRAM(s) Oral every 12 hours  heparin  Infusion. 1700 Unit(s)/Hr (17 mL/Hr) IV Continuous <Continuous>  influenza  Vaccine (HIGH DOSE) 0.5 milliLiter(s) IntraMuscular once  lacosamide IVPB 200 milliGRAM(s) IV Intermittent every 12 hours  lamoTRIgine 50 milliGRAM(s) Oral two times a day  levETIRAcetam   Injectable 750 milliGRAM(s) IV Push every 12 hours  lurasidone 20 milliGRAM(s) Oral at bedtime  naloxegol 12.5 milliGRAM(s) Oral daily  pantoprazole   Suspension 40 milliGRAM(s) Oral daily  polyethylene glycol 3350 17 Gram(s) Oral daily  senna 2 Tablet(s) Oral at bedtime  sevelamer carbonate Powder 1600 milliGRAM(s) Oral every 8 hours  sodium chloride 3%  Inhalation 4 milliLiter(s) Inhalation every 6 hours    MEDICATIONS  (PRN):  acetaminophen   Oral Liquid .. 650 milliGRAM(s) Oral every 6 hours PRN Mild Pain (1 - 3)  artificial tears (preservative free) Ophthalmic Solution 1 Drop(s) Both EYES every 6 hours PRN Dry Eyes    Vital Signs Last 24 Hrs  T(C): 37.3 (04 Mar 2025 00:00), Max: 37.3 (04 Mar 2025 00:00)  T(F): 99.1 (04 Mar 2025 00:00), Max: 99.1 (04 Mar 2025 00:00)  HR: 68 (04 Mar 2025 03:41) (60 - 99)  BP: 131/62 (04 Mar 2025 02:00) (116/57 - 176/81)  BP(mean): 89 (04 Mar 2025 02:00) (79 - 116)  RR: 20 (04 Mar 2025 02:00) (16 - 25)  SpO2: 99% (04 Mar 2025 03:41) (96% - 100%)    Parameters below as of 03 Mar 2025 23:20  Patient On (Oxygen Delivery Method): ventilator    Physical Exam:  Gen: Intubated  HEENT: EOMI, MMM  Chest: equal chest rise  Cardiac: regular   Abd: non distended     Labs:                        7.3    8.48  )-----------( 109      ( 04 Mar 2025 00:40 )             22.8     CBC Full  -  ( 04 Mar 2025 00:40 )  WBC Count : 8.48 K/uL  RBC Count : 2.25 M/uL  Hemoglobin : 7.3 g/dL  Hematocrit : 22.8 %  Platelet Count - Automated : 109 K/uL  Mean Cell Volume : 101.3 fl  Mean Cell Hemoglobin : 32.4 pg  Mean Cell Hemoglobin Concentration : 32.0 g/dL    03-04    140  |  96  |  126[H]  ----------------------------<  146[H]  4.0   |  23  |  3.99[H]    Ca    9.1      04 Mar 2025 00:26  Phos  9.1     03-04  Mg     2.9     03-04    TPro  6.8  /  Alb  2.9[L]  /  TBili  0.3  /  DBili  x   /  AST  34  /  ALT  32  /  AlkPhos  120  03-04    PT/INR - ( 04 Mar 2025 00:29 )   PT: 11.1 sec;   INR: 0.96 ratio         PTT - ( 04 Mar 2025 00:29 )  PTT:69.7 sec  Bilirubin Total: 0.3 mg/dL (03-04-25 @ 00:26)

## 2025-03-04 NOTE — PROGRESS NOTE ADULT - PROBLEM SELECTOR PLAN 3
-CT chest with b/l GGO, new since 1/30/25. Likely COVID PNA +/- superimposed bacterial PNA  -Continue ABX.  2/27 : on antibiotics:  2/28: remains intubated and sedated  on vasopressors:  for septic shock  3/1: as above  3/2: off antibiotics now:  3/3; being monitored off antibiotics;  3/4: off antibiotics

## 2025-03-04 NOTE — PROGRESS NOTE ADULT - ASSESSMENT
Mr. Gr is a 69 year old male with PMHx of seizure disorder, sleep apnea, HTN, chronic idiopathic constipation, stage III CKD, severe obesity, secondary hyperparathyroidism, anemia, thrombocytopenia, hyperlipidemia, testicular cancer under treatment with Dr. Ovalles who is admitted for acute hypoxic respiratory failure secondary to COVID vs superimposed pneumonia with new onset thrombocytopenia. He was recently discharged 02/06/2025 after a tenous stay including intubation in the ICU for respiratory failure in setting of seizure. He had recovered and was discharged to rehab.      Former smoker, quit 14y prior, denied ETOH, drug use. Lives at home. Does not have children. Denies family history of blood disorders or malignancy.  Denies previous workplace related exposures.  Denies previous history of blood transfusions.  Denied history of thromboses.  Denied history of  requiring anticoagulation.     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  Margins were negative.  pT3Nx. CT C/A/P with few left para-aortic and left iliac chain lymph nodes largest measuring 8.1 cm left para-aortic node, bilateral subcentimeter noncalcified pulmonary nodules measuring up to 7 mm. AFP, LDH, hCG were all elevated. 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 Started on adjuvant therapy with Cisplatin+Etoposide, so far completed 4 cycles of treatment with cisplatin dose reduced 50% and Etoposide 50% due to thrombocytopenia.      02/19/2025: on HFNC, noted tachycardia and fever, Klebsiella in urine as well, thrombocytopenia stable/improving, no signs of active bleeding     02/20/2025: developed A.fib with RVR and was placed on heparin drip and pending cardiology eval    02/21/2025: awake, on AVAPS overnight, noted RRT for hypoxia as pt removed HFNC at some point in time, good response thereafter     02/22/2025:  continues on AVAPS this morning, noted RRT overnight for hypoxia, hypercapnia, ICU following, US LE negative for DVT, counts overall stable/improved     02/23/2025: progressive respiratory failure, noted ongoing RTT this morning with pending intubation and transfer to MICU     02/24/2025: intubated 02/23/2025, sedated, on pressor support, no signs of bleeding, counts noted, no signs of bleeding     02/25/2025: continues in MICU, intubated and sedated, no signs of bleeding    02/26/2025: continues in MICU, intubated, without signs of bleeding, continues on heparin drip     02/27/2025:  remains under the care of the ICU, intubated, no signs of bleeding and continues on heparin drip, counts stable, has not required PRBC support this admission    02/28/2025: continues under the care of MICU, continues intubated, no signs of bleeding, on heparin drip    03/01/2025: continues in MICU, intubated, direct josefina IgG positive, eluate negative, not likely to be clinically significant     03/02/2025:  continues in MICU, nursing staff at bedside, no overnight events noted. CTH without acute intracranial pathology     03/03/2025: continues in MICU, intubated, on heparin drip, 1u PRBC overnight, no signs of bleeding, platelet count stable         #Acute hypoxic respiratory failure  # COVID  - CT noted with bilateral GGO  - ID following  - Pulmonary following  - Antibiotics per primary team/MICU and ID   - Intubated 02/23/2025 AM, MICU     # Thrombocytopenia   - Did occur during most recent admission and improved to normal prior to discharge   - Without signs of bleeding  - Could be multifactorial, possibly due to infection   - Continue to trend  - Transfuse to maintain Plt > 10k unless actively bleeding then maintain > 50k     # Brain lesion  - pt with hx of seizures  - MRI brain now with 5.4 mm enhancing lesion in the LEFT middle cerebellar peduncle with associated edema suspicious for metastases  - MRI Lumbar negative for acute disease  - Small lesion without mass effect or edema, recommended to correlate per neurology if foci and locus are concordant with seizure activity  - Neurology recs noted, new lesion not likely to cause seizure  - It is rare, but nonetheless not impossible, for testicular cancer to be metastatic to the brain  - He will need another PET-CT, can be completed outpatient once stable if concern can proceed with biopsy at that time   - Previous endocrinology eval for thyroid nodule noted  - AFP/BHCH normal,  previously   - Repeat CTH without acute intracranial pathology       # 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 Ovalles, agree with current plan  - Follow up as outpatient with Franki Ovalles MD     # Acute kidney injury on CKD Stage IIIA  - Likely multifactorial  - Nephrology consult and recs noted  - Continue to trend     # Normocytic anemia  - Chronic, has hx of PRBC during chemo 07/2024  - Noted Anti E, Anti-K Anti-C antibodies previously  - direct josefina IgG positive, eluate negative, not likely to be clinically significant   - s/p 1u PRBC 03/03/2025   - Monitor for bleeding  - Recommend to transfuse to maintain Hb > 7    # Klebsiella UTI  -Antibiotics per ID and primary team       Recommendations  - Trend with CBC daily   - Transfuse to maintain Hb > 7   - Transfuse to maintain Plt >10k unless active bleeding then >50k   - Monitor renal function  - Antibiotics per primary team/MICU and ID   - Cardiology follow up to address anticoagulation, currently on heparin drip   - f/u ongoing ICU recs         Thank you for allowing me to participate in the care of Mr. Gr please do not hesitate to call or text me if you have further questions or concerns.     Brayan Mart MD  [facility] [place]   Division of Hematology/Oncology  [address] [place] [address], [address]  [place] [address]  P: [phone]  F: [phone]    Attestation:    ----Pt evaluated including face-to-face interaction in addition to chart review, reviewing treatment plan, and managing the patient’s chronic diagnoses as listed in the assessment----        Mr. Gr is a 69 year old male with PMHx of seizure disorder, sleep apnea, HTN, chronic idiopathic constipation, stage III CKD, severe obesity, secondary hyperparathyroidism, anemia, thrombocytopenia, hyperlipidemia, testicular cancer under treatment with Dr. Ovalles who is admitted for acute hypoxic respiratory failure secondary to COVID vs superimposed pneumonia with new onset thrombocytopenia. He was recently discharged 02/06/2025 after a tenous stay including intubation in the ICU for respiratory failure in setting of seizure. He had recovered and was discharged to rehab.      Former smoker, quit 14y prior, denied ETOH, drug use. Lives at home. Does not have children. Denies family history of blood disorders or malignancy.  Denies previous workplace related exposures.  Denies previous history of blood transfusions.  Denied history of thromboses.  Denied history of  requiring anticoagulation.     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  Margins were negative.  pT3Nx. CT C/A/P with few left para-aortic and left iliac chain lymph nodes largest measuring 8.1 cm left para-aortic node, bilateral subcentimeter noncalcified pulmonary nodules measuring up to 7 mm. AFP, LDH, hCG were all elevated. 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 Started on adjuvant therapy with Cisplatin+Etoposide, so far completed 4 cycles of treatment with cisplatin dose reduced 50% and Etoposide 50% due to thrombocytopenia.      02/19/2025: on HFNC, noted tachycardia and fever, Klebsiella in urine as well, thrombocytopenia stable/improving, no signs of active bleeding     02/20/2025: developed A.fib with RVR and was placed on heparin drip and pending cardiology eval    02/21/2025: awake, on AVAPS overnight, noted RRT for hypoxia as pt removed HFNC at some point in time, good response thereafter     02/22/2025:  continues on AVAPS this morning, noted RRT overnight for hypoxia, hypercapnia, ICU following, US LE negative for DVT, counts overall stable/improved     02/23/2025: progressive respiratory failure, noted ongoing RTT this morning with pending intubation and transfer to MICU     02/24/2025: intubated 02/23/2025, sedated, on pressor support, no signs of bleeding, counts noted, no signs of bleeding     02/25/2025: continues in MICU, intubated and sedated, no signs of bleeding    02/26/2025: continues in MICU, intubated, without signs of bleeding, continues on heparin drip     02/27/2025:  remains under the care of the ICU, intubated, no signs of bleeding and continues on heparin drip, counts stable, has not required PRBC support this admission    02/28/2025: continues under the care of MICU, continues intubated, no signs of bleeding, on heparin drip    03/01/2025: continues in MICU, intubated, direct josefina IgG positive, eluate negative, not likely to be clinically significant     03/02/2025:  continues in MICU, nursing staff at bedside, no overnight events noted. CTH without acute intracranial pathology     03/03/2025: continues in MICU, intubated, on heparin drip, 1u PRBC overnight, no signs of bleeding, platelet count stable     03/04/2025: awake, moving arms spontaneously on exam, continues without much interaction however. No signs of bleeding, continues heparin drip, continues intubated in MICU       #Acute hypoxic respiratory failure  # COVID  - CT noted with bilateral GGO  - ID following  - Pulmonary following  - Antibiotics per primary team/MICU and ID   - Intubated 02/23/2025 AM, MICU     # Thrombocytopenia   - Did occur during most recent admission and improved to normal prior to discharge   - Without signs of bleeding  - Could be multifactorial, possibly due to infection   - Continue to trend  - Transfuse to maintain Plt > 10k unless actively bleeding then maintain > 50k     # Brain lesion  - pt with hx of seizures  - MRI brain now with 5.4 mm enhancing lesion in the LEFT middle cerebellar peduncle with associated edema suspicious for metastases  - MRI Lumbar negative for acute disease  - Small lesion without mass effect or edema, recommended to correlate per neurology if foci and locus are concordant with seizure activity  - Neurology recs noted, new lesion not likely to cause seizure  - It is rare, but nonetheless not impossible, for testicular cancer to be metastatic to the brain  - He will need another PET-CT, can be completed outpatient once stable if concern can proceed with biopsy at that time   - Previous endocrinology eval for thyroid nodule noted  - AFP/BHCH normal,  previously   - Repeat CTH without acute intracranial pathology       # 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 Ovalles, agree with current plan  - Follow up as outpatient with Franki Ovalles MD     # Acute kidney injury on CKD Stage IIIA  - Likely multifactorial  - Nephrology consult and recs noted  - Continue to trend     # Normocytic anemia  - Chronic, has hx of PRBC during chemo 07/2024  - Noted Anti E, Anti-K Anti-C antibodies previously  - direct josefina IgG positive, eluate negative, not likely to be clinically significant   - s/p 1u PRBC 03/03/2025   - Monitor for bleeding  - Recommend to transfuse to maintain Hb > 7    # Klebsiella UTI  -Antibiotics per ID and primary team       Recommendations  - Trend CBC daily   - Transfuse to maintain Hb > 7   - Transfuse to maintain Plt >10k unless active bleeding then >50k   - Monitor renal function  - Antibiotics per primary team/MICU and ID   - Cardiology follow up to address anticoagulation, currently on heparin drip   - f/u ongoing ICU recs         Thank you for allowing me to participate in the care of Mr. Gr please do not hesitate to call or text me if you have further questions or concerns.     Brayan Mart MD  [facility] [place]   Division of Hematology/Oncology  [address], [address]  [place] [address]  P: [phone]  F: [phone]    Attestation:    ----Pt evaluated including face-to-face interaction in addition to chart review, reviewing treatment plan, and managing the patient’s chronic diagnoses as listed in the assessment----

## 2025-03-04 NOTE — PROGRESS NOTE ADULT - ATTENDING COMMENTS
1. Acute  hypoxemic  respiratory failure in setting of Covid with new superimposed  pneumonia. Likely aspiration pneumonia.. Pt finished course of treatment  for covid and pneumonia. Currently  remains on mechanical ventilation. Tolerating PS 5 wean today for hours.  Secretions thin. Trial of extubation.     2. Renal. Acute renal failure , Pre renal . Over diuresis.  Good UO and decrease creatinine with PRBC and albumin. Start LR at 75 cc/hr  for 24 hrs..    3. Neuro. starting to wake up. Opens eyes to name. Decrease Precedex as tolerated. EEG negative. Stop EEG.'  Continue current AEDS regimen. Pt alert and following commands.    4.Cardiac. Chronic atrial fib. Continue  IV heparin Rate  IV lopressor as needed.  Restart metoprolol  12.5 bid.    5..DVT prophylaxis: IV heparin    6. GOC; Full code.    7. Psych. Schizophrenia: Continue  current psych  regimen

## 2025-03-04 NOTE — PROGRESS NOTE ADULT - NSPROGADDITIONALINFOA_GEN_ALL_CORE
speech & swallow eval appreciated, s/p FEES: recommends thicken pureed diet.  events reviewed, RRT for hypoxia, intubated/sedated for increased work of breathing.   transferred to ICU. ICU eval appreciated  GOC discussed, full code per the sister.   Transferred to ICU, remain intubated.   Bumex gtt for overload, now off. weaned off pressors.   completed abx, completed antiviral.   ICU care appreciated.   on sedation vacation. vent management per ICU.  vEEG per ICU team: no seizures.  s/p 1 unit prbc overnight, monitor hgb.     - Dr. SHIRA Leroy ([facility])  - [phone]

## 2025-03-04 NOTE — PROGRESS NOTE ADULT - PROBLEM SELECTOR PLAN 7
-By hx, w/ CPAP at home.    2/28: he remains intubated in MICU  3/2: om weaning trials:  3/4 : cont weaning trials

## 2025-03-04 NOTE — PROGRESS NOTE ADULT - PROBLEM SELECTOR PLAN 5
- Gabapentin 150 mg q12h (home med)   - Keppra 750 mg BID (home med)   - Lacosamide 100 mg BID (home med)   - Lurasidone 40 mg daily (home med)   - Lamotrigine 100 mg BID (home med)  - Escitalopram 15 mg daily (home med)  - medicaion via OG tube while intubated  - vEEG per ICU team: no apparent seizures.

## 2025-03-04 NOTE — PROGRESS NOTE ADULT - PROBLEM SELECTOR PLAN 6
Unclear cause. Also with anemia. May be related to metastatic cancer that may have gone to bone marrow. Not present in the past vs. viral covid19  - Monitor for improvement daily  - Appreciate heme/onc  - brain lesion on prior MRI?  - heme/onc following.  - 1 unit prbc 3/2/25, monitor hgb. stable.

## 2025-03-04 NOTE — PROGRESS NOTE ADULT - PROBLEM SELECTOR PLAN 5
-By hx  -Continue Keppra.  MRI showed :  Mild periventricular and moderate deep and subcortical white   matter ischemia. 5.4 mm enhancing lesion in the LEFT middle cerebellar   peduncle with associated edema suspicious for metastases.    Marked LEFT   and mild RIGHT mucosal thickening within the BILATERAL mastoid air cells.  defer to primaryt  \eam    3/3: getting eeg today  3/4: per neurology

## 2025-03-04 NOTE — AIRWAY REMOVAL NOTE  ADULT & PEDS - ARTIFICAL AIRWAY REMOVAL COMMENTS
Patient was on CPAP/PS trial and tolerated the SBT well with no signs of respiratory distress noted. Provider placed an order to extubate the patient. A positive cuff leak observed before extubation. Patient extubated to AVAPS 20/460/40%/+6/MINP10/MAXP30. The oxygen saturation at the time of extubation was 100%. No stridor present post-extubation. No other signs of respiratory distress noted.

## 2025-03-04 NOTE — EEG REPORT - NS EEG TEXT BOX
OSCAR MILAN [facility]-[id number]     Study Date: 3/3/25 08:00-18:46  Duration: 10 hr 46 min  --------------------------------------------------------------------------------------------------  History:  CC/ HPI Patient is a 69y old  Male who presents with a chief complaint of Acute on chronic hypoxemic respiratory failure (04 Mar 2025 09:34)    MEDICATIONS  (STANDING):  albuterol/ipratropium for Nebulization 3 milliLiter(s) Nebulizer every 6 hours  atorvastatin 20 milliGRAM(s) Oral at bedtime  chlorhexidine 0.12% Liquid 15 milliLiter(s) Oral Mucosa every 12 hours  cholecalciferol 1000 Unit(s) Oral daily  dexMEDEtomidine Infusion 0.2 MICROgram(s)/kG/Hr (4.46 mL/Hr) IV Continuous <Continuous>  escitalopram 15 milliGRAM(s) Oral with breakfast  gabapentin Solution 150 milliGRAM(s) Oral every 12 hours  heparin  Infusion. 1700 Unit(s)/Hr (17 mL/Hr) IV Continuous <Continuous>  influenza  Vaccine (HIGH DOSE) 0.5 milliLiter(s) IntraMuscular once  lacosamide IVPB 200 milliGRAM(s) IV Intermittent every 12 hours  lactated ringers. 1000 milliLiter(s) (50 mL/Hr) IV Continuous <Continuous>  lamoTRIgine 50 milliGRAM(s) Oral two times a day  levETIRAcetam   Injectable 750 milliGRAM(s) IV Push every 12 hours  lurasidone 20 milliGRAM(s) Oral at bedtime  naloxegol 12.5 milliGRAM(s) Oral daily  pantoprazole   Suspension 40 milliGRAM(s) Oral daily  polyethylene glycol 3350 17 Gram(s) Oral daily  senna 2 Tablet(s) Oral at bedtime  sevelamer carbonate Powder 1600 milliGRAM(s) Oral every 8 hours  sodium chloride 3%  Inhalation 4 milliLiter(s) Inhalation every 6 hours    --------------------------------------------------------------------------------------------------  Study Interpretation:    [[[Abbreviation Key:  PDR=alpha rhythm/posterior dominant rhythm. A-P=anterior posterior.  Amplitude: ‘very low’:<20; ‘low’:20-49; ‘medium’:; ‘high’:>150uV.  Persistence for periodic/rhythmic patterns (% of epoch) ‘rare’:<1%; ‘occasional’:1-10%; ‘frequent’:10-50%; ‘abundant’:50-90%; ‘continuous’:>90%.  Persistence for sporadic discharges: ‘rare’:<1/hr; ‘occasional’:1/min-1/hr; ‘frequent’:>1/min; ‘abundant’:>1/10 sec.  RPP=rhythmic and periodic patterns; GRDA=generalized rhythmic delta activity; FIRDA=frontal intermittent GRDA; LRDA=lateralized rhythmic delta activity; TIRDA=temporal intermittent rhythmic delta activity;  LPD=PLED=lateralized periodic discharges; GPD=generalized periodic discharges; BIPDs =bilateral independent periodic discharges; Mf=multifocal; SIRPDs=stimulus induced rhythmic, periodic, or ictal appearing discharges; BIRDs=brief potentially ictal rhythmic discharges >4 Hz, lasting .5-10s; PFA (paroxysmal bursts >13 Hz or =8 Hz <10s).  Modifiers: +F=with fast component; +S=with spike component; +R=with rhythmic component.  S-B=burst suppression pattern.  Max=maximal. N1-drowsy; N2-stage II sleep; N3-slow wave sleep. SSS/BETS=small sharp spikes/benign epileptiform transients of sleep. HV=hyperventilation; PS=photic stimulation]]]    Daily EEG Visual Analysis    FINDINGS:      Background:  Continuity: Continuous  Symmetry: Symmetric  Posterior dominant rhythm (PDR): Intermittent, 7 Hz, reactive to eye closure. Symmetric low-amplitude frontal beta in wakefulness.  Voltage: Normal  Anterior-Posterior Gradient: Present  Other background findings: Intermittent diffuse polymorphic delta slowing  Breach: Absent    Background Slowing:  Generalized slowing: As above  Focal slowing: None    State Changes:   Drowsiness is characterized by fragmentation, attenuation, and slowing of the background activity.  Stage 2 sleep is characterized by rudimentary symmetric K complexes and sleep spindles.     Interictal Findings:  None    Electrographic and Electroclinical seizures:  None    Other Clinical Events:  None    Activation Procedures:   Hyperventilation is not performed.    Photic stimulation is not performed.    Artifacts:  Intermittent myogenic and movement artifacts are present.    Single-lead EKG: Regular rhythm    EEG Classification / Summary:  Abnormal EEG in the awake, drowsy, and asleep states.   Mild-moderate diffuse slowing.  Frequent drowsiness.  No focal or epileptiform abnormalities are captured.   No seizures.    Clinical Impression:  Mild-moderate diffuse cerebral dysfunction is nonspecific in etiology.   Frequent drowsiness.  No epileptiform abnormalities or seizures.          -------------------------------------------------------------------------------------------------------    Amirah Saunders MD  Attending Physician, [facility]    -------------------------------------------------------------------------------------------------------    To reach EEG technologist:  Please use the pager number for the appropriate hospital or contact the .  At [facility] - Pager #: [phone]    To reach EEG-reading physician:  EEG Reading Room Phone #: [phone]  Epilepsy Answering Service after 5PM and before 8:30AM: Phone #: [phone]

## 2025-03-04 NOTE — PROGRESS NOTE ADULT - SUBJECTIVE AND OBJECTIVE BOX
[facility]   [phone]  RENAL FOLLOW UP NOTE  --------------------------------------------------------------------------------  Chief Complaint:    24 hour events/subjective:    seen earlier   on PS vent trial     PAST HISTORY  --------------------------------------------------------------------------------  No significant changes to PMH, PSH, FHx, SHx, unless otherwise noted    ALLERGIES & MEDICATIONS  --------------------------------------------------------------------------------  Allergies    seasonal allergies (Unknown)  penicillin (Hives)    Intolerances    latex (Rash)    Standing Inpatient Medications  albuterol/ipratropium for Nebulization 3 milliLiter(s) Nebulizer every 6 hours  atorvastatin 20 milliGRAM(s) Oral at bedtime  cholecalciferol 1000 Unit(s) Oral daily  erythromycin   Ointment 1 Application(s) Both EYES four times a day  escitalopram 15 milliGRAM(s) Oral with breakfast  gabapentin Solution 150 milliGRAM(s) Oral every 12 hours  heparin  Infusion. 1700 Unit(s)/Hr IV Continuous <Continuous>  influenza  Vaccine (HIGH DOSE) 0.5 milliLiter(s) IntraMuscular once  lacosamide IVPB 200 milliGRAM(s) IV Intermittent every 12 hours  lactated ringers. 1000 milliLiter(s) IV Continuous <Continuous>  lamoTRIgine 50 milliGRAM(s) Oral two times a day  levETIRAcetam   Injectable 750 milliGRAM(s) IV Push every 12 hours  lurasidone 20 milliGRAM(s) Oral at bedtime  naloxegol 12.5 milliGRAM(s) Oral daily  pantoprazole  Injectable 40 milliGRAM(s) IV Push daily  polyethylene glycol 3350 17 Gram(s) Oral daily  senna 2 Tablet(s) Oral at bedtime  sevelamer carbonate Powder 1600 milliGRAM(s) Oral every 8 hours  sodium chloride 3%  Inhalation 4 milliLiter(s) Inhalation every 6 hours    PRN Inpatient Medications  artificial tears (preservative free) Ophthalmic Solution 1 Drop(s) Both EYES every 6 hours PRN      REVIEW OF SYSTEMS  --------------------------------------------------------------------------------    intubated     VITALS/PHYSICAL EXAM  --------------------------------------------------------------------------------  T(C): 36.6 (03-04-25 @ 20:00), Max: 37.3 (03-04-25 @ 00:00)  HR: 94 (03-04-25 @ 22:00) (62 - 94)  BP: 130/60 (03-04-25 @ 22:00) (127/59 - 178/76)  RR: 35 (03-04-25 @ 22:00) (16 - 36)  SpO2: 96% (03-04-25 @ 22:00) (96% - 100%)  Wt(kg): --        03-03-25 @ 07:01  -  03-04-25 @ 07:00  --------------------------------------------------------  IN: 2764.7 mL / OUT: 1205 mL / NET: 1559.7 mL    03-04-25 @ 07:01  -  03-04-25 @ 22:21  --------------------------------------------------------  IN: 1267 mL / OUT: 1095 mL / NET: 172 mL      Physical Exam:  	      Gen: ill appearing male, intubated   	Pulm: decrease bs, +coarse   	CV: No JVD. RRR, S1S2; no rub  	Abd: +BS, soft, nondistended  	UE: Warm, no cyanosis  no clubbing,  no edema;  	LE: Warm, no cyanosis  no clubbing, no edema      LABS/STUDIES  --------------------------------------------------------------------------------              7.3    8.48  >-----------<  109      [03-04-25 @ 00:40]              22.8     140  |  96  |  126  ----------------------------<  146      [03-04-25 @ 00:26]  4.0   |  23  |  3.99        Ca     9.1     [03-04-25 @ 00:26]      Mg     2.9     [03-04-25 @ 00:26]      Phos  9.1     [03-04-25 @ 00:26]    TPro  6.8  /  Alb  2.9  /  TBili  0.3  /  DBili  x   /  AST  34  /  ALT  32  /  AlkPhos  120  [03-04-25 @ 00:26]    PT/INR: PT 11.1 , INR 0.96       [03-04-25 @ 00:29]  PTT: 69.7       [03-04-25 @ 00:29]      Creatinine Trend:  SCr 3.99 [03-04 @ 00:26]  SCr 4.34 [03-03 @ 00:01]  SCr 4.06 [03-02 @ 12:01]  SCr 4.11 [03-02 @ 03:48]  SCr 4.28 [03-01 @ 12:36]          Ferritin [id number]      [02-23-25 @ 06:15]  TSH 0.87      [01-25-25 @ 11:31]

## 2025-03-05 LAB
ALBUMIN SERPL ELPH-MCNC: 3.1 G/DL — LOW (ref 3.3–5)
ALP SERPL-CCNC: 133 U/L — HIGH (ref 40–120)
ALT FLD-CCNC: 32 U/L — SIGNIFICANT CHANGE UP (ref 10–45)
ANION GAP SERPL CALC-SCNC: 16 MMOL/L — SIGNIFICANT CHANGE UP (ref 5–17)
APTT BLD: 122 SEC — CRITICAL HIGH (ref 24.5–35.6)
APTT BLD: 63.4 SEC — HIGH (ref 24.5–35.6)
APTT BLD: 72 SEC — HIGH (ref 24.5–35.6)
AST SERPL-CCNC: 34 U/L — SIGNIFICANT CHANGE UP (ref 10–40)
BASOPHILS # BLD AUTO: 0.01 K/UL — SIGNIFICANT CHANGE UP (ref 0–0.2)
BASOPHILS NFR BLD AUTO: 0.1 % — SIGNIFICANT CHANGE UP (ref 0–2)
BILIRUB SERPL-MCNC: 0.4 MG/DL — SIGNIFICANT CHANGE UP (ref 0.2–1.2)
BUN SERPL-MCNC: 124 MG/DL — HIGH (ref 7–23)
CALCIUM SERPL-MCNC: 9.3 MG/DL — SIGNIFICANT CHANGE UP (ref 8.4–10.5)
CHLORIDE SERPL-SCNC: 100 MMOL/L — SIGNIFICANT CHANGE UP (ref 96–108)
CO2 SERPL-SCNC: 24 MMOL/L — SIGNIFICANT CHANGE UP (ref 22–31)
CREAT SERPL-MCNC: 3.31 MG/DL — HIGH (ref 0.5–1.3)
EGFR: 20 ML/MIN/1.73M2 — LOW
EGFR: 20 ML/MIN/1.73M2 — LOW
EOSINOPHIL # BLD AUTO: 0.26 K/UL — SIGNIFICANT CHANGE UP (ref 0–0.5)
EOSINOPHIL NFR BLD AUTO: 2.9 % — SIGNIFICANT CHANGE UP (ref 0–6)
GAS PNL BLDA: SIGNIFICANT CHANGE UP
GAS PNL BLDA: SIGNIFICANT CHANGE UP
GAS PNL BLDV: SIGNIFICANT CHANGE UP
GLUCOSE SERPL-MCNC: 112 MG/DL — HIGH (ref 70–99)
HCT VFR BLD CALC: 25.8 % — LOW (ref 39–50)
HGB BLD-MCNC: 8 G/DL — LOW (ref 13–17)
IMM GRANULOCYTES NFR BLD AUTO: 2.8 % — HIGH (ref 0–0.9)
INR BLD: 1.01 RATIO — SIGNIFICANT CHANGE UP (ref 0.85–1.16)
LYMPHOCYTES # BLD AUTO: 0.76 K/UL — LOW (ref 1–3.3)
LYMPHOCYTES # BLD AUTO: 8.6 % — LOW (ref 13–44)
MAGNESIUM SERPL-MCNC: 3 MG/DL — HIGH (ref 1.6–2.6)
MCHC RBC-ENTMCNC: 31 G/DL — LOW (ref 32–36)
MCHC RBC-ENTMCNC: 31.4 PG — SIGNIFICANT CHANGE UP (ref 27–34)
MCV RBC AUTO: 101.2 FL — HIGH (ref 80–100)
MONOCYTES # BLD AUTO: 0.55 K/UL — SIGNIFICANT CHANGE UP (ref 0–0.9)
MONOCYTES NFR BLD AUTO: 6.2 % — SIGNIFICANT CHANGE UP (ref 2–14)
NEUTROPHILS # BLD AUTO: 7.01 K/UL — SIGNIFICANT CHANGE UP (ref 1.8–7.4)
NEUTROPHILS NFR BLD AUTO: 79.4 % — HIGH (ref 43–77)
NRBC BLD AUTO-RTO: 0 /100 WBCS — SIGNIFICANT CHANGE UP (ref 0–0)
PHOSPHATE SERPL-MCNC: 6.3 MG/DL — HIGH (ref 2.5–4.5)
PLATELET # BLD AUTO: 142 K/UL — LOW (ref 150–400)
POTASSIUM SERPL-MCNC: 4.2 MMOL/L — SIGNIFICANT CHANGE UP (ref 3.5–5.3)
POTASSIUM SERPL-SCNC: 4.2 MMOL/L — SIGNIFICANT CHANGE UP (ref 3.5–5.3)
PROT SERPL-MCNC: 7 G/DL — SIGNIFICANT CHANGE UP (ref 6–8.3)
PROTHROM AB SERPL-ACNC: 11.5 SEC — SIGNIFICANT CHANGE UP (ref 9.9–13.4)
RBC # BLD: 2.55 M/UL — LOW (ref 4.2–5.8)
RBC # FLD: 16.7 % — HIGH (ref 10.3–14.5)
SODIUM SERPL-SCNC: 140 MMOL/L — SIGNIFICANT CHANGE UP (ref 135–145)
WBC # BLD: 8.84 K/UL — SIGNIFICANT CHANGE UP (ref 3.8–10.5)
WBC # FLD AUTO: 8.84 K/UL — SIGNIFICANT CHANGE UP (ref 3.8–10.5)

## 2025-03-05 PROCEDURE — 99233 SBSQ HOSP IP/OBS HIGH 50: CPT | Mod: GC

## 2025-03-05 PROCEDURE — 71045 X-RAY EXAM CHEST 1 VIEW: CPT | Mod: 26

## 2025-03-05 RX ORDER — OFLOXACIN 3 MG/ML
1 SOLUTION OPHTHALMIC EVERY 4 HOURS
Refills: 0 | Status: DISCONTINUED | OUTPATIENT
Start: 2025-03-05 | End: 2025-03-09

## 2025-03-05 RX ORDER — SODIUM CHLORIDE 9 G/1000ML
1000 INJECTION, SOLUTION INTRAVENOUS
Refills: 0 | Status: DISCONTINUED | OUTPATIENT
Start: 2025-03-05 | End: 2025-03-06

## 2025-03-05 RX ORDER — METOPROLOL SUCCINATE 50 MG/1
12.5 TABLET, EXTENDED RELEASE ORAL
Refills: 0 | Status: DISCONTINUED | OUTPATIENT
Start: 2025-03-05 | End: 2025-03-09

## 2025-03-05 RX ADMIN — ESCITALOPRAM OXALATE 15 MILLIGRAM(S): 20 TABLET ORAL at 07:39

## 2025-03-05 RX ADMIN — Medication 4 MILLILITER(S): at 05:20

## 2025-03-05 RX ADMIN — Medication 40 MILLIGRAM(S): at 11:20

## 2025-03-05 RX ADMIN — HEPARIN SODIUM 1500 UNIT(S)/HR: 1000 INJECTION INTRAVENOUS; SUBCUTANEOUS at 00:57

## 2025-03-05 RX ADMIN — LAMOTRIGINE 50 MILLIGRAM(S): 150 TABLET ORAL at 17:03

## 2025-03-05 RX ADMIN — HEPARIN SODIUM 1500 UNIT(S)/HR: 1000 INJECTION INTRAVENOUS; SUBCUTANEOUS at 07:39

## 2025-03-05 RX ADMIN — IPRATROPIUM BROMIDE AND ALBUTEROL SULFATE 3 MILLILITER(S): .5; 2.5 SOLUTION RESPIRATORY (INHALATION) at 17:23

## 2025-03-05 RX ADMIN — LAMOTRIGINE 50 MILLIGRAM(S): 150 TABLET ORAL at 05:08

## 2025-03-05 RX ADMIN — Medication 1000 UNIT(S): at 11:23

## 2025-03-05 RX ADMIN — Medication 4 MILLILITER(S): at 23:20

## 2025-03-05 RX ADMIN — GABAPENTIN 150 MILLIGRAM(S): 400 CAPSULE ORAL at 17:02

## 2025-03-05 RX ADMIN — SODIUM CHLORIDE 50 MILLILITER(S): 9 INJECTION, SOLUTION INTRAVENOUS at 05:08

## 2025-03-05 RX ADMIN — ERYTHROMYCIN 1 APPLICATION(S): 5 OINTMENT OPHTHALMIC at 23:44

## 2025-03-05 RX ADMIN — METOPROLOL SUCCINATE 12.5 MILLIGRAM(S): 50 TABLET, EXTENDED RELEASE ORAL at 17:03

## 2025-03-05 RX ADMIN — LEVETIRACETAM 750 MILLIGRAM(S): 10 INJECTION, SOLUTION INTRAVENOUS at 05:33

## 2025-03-05 RX ADMIN — ERYTHROMYCIN 1 APPLICATION(S): 5 OINTMENT OPHTHALMIC at 11:22

## 2025-03-05 RX ADMIN — OFLOXACIN 1 DROP(S): 3 SOLUTION OPHTHALMIC at 18:50

## 2025-03-05 RX ADMIN — ATORVASTATIN CALCIUM 20 MILLIGRAM(S): 80 TABLET, FILM COATED ORAL at 22:08

## 2025-03-05 RX ADMIN — ERYTHROMYCIN 1 APPLICATION(S): 5 OINTMENT OPHTHALMIC at 05:09

## 2025-03-05 RX ADMIN — LACOSAMIDE 140 MILLIGRAM(S): 150 TABLET, FILM COATED ORAL at 05:33

## 2025-03-05 RX ADMIN — Medication 4 MILLILITER(S): at 17:23

## 2025-03-05 RX ADMIN — LACOSAMIDE 140 MILLIGRAM(S): 150 TABLET, FILM COATED ORAL at 17:03

## 2025-03-05 RX ADMIN — Medication 4 MILLILITER(S): at 11:22

## 2025-03-05 RX ADMIN — GABAPENTIN 150 MILLIGRAM(S): 400 CAPSULE ORAL at 05:08

## 2025-03-05 RX ADMIN — HEPARIN SODIUM 1500 UNIT(S)/HR: 1000 INJECTION INTRAVENOUS; SUBCUTANEOUS at 15:18

## 2025-03-05 RX ADMIN — IPRATROPIUM BROMIDE AND ALBUTEROL SULFATE 3 MILLILITER(S): .5; 2.5 SOLUTION RESPIRATORY (INHALATION) at 23:20

## 2025-03-05 RX ADMIN — IPRATROPIUM BROMIDE AND ALBUTEROL SULFATE 3 MILLILITER(S): .5; 2.5 SOLUTION RESPIRATORY (INHALATION) at 05:20

## 2025-03-05 RX ADMIN — OFLOXACIN 1 DROP(S): 3 SOLUTION OPHTHALMIC at 22:09

## 2025-03-05 RX ADMIN — SEVELAMER HYDROCHLORIDE 1600 MILLIGRAM(S): 800 TABLET ORAL at 05:08

## 2025-03-05 RX ADMIN — IPRATROPIUM BROMIDE AND ALBUTEROL SULFATE 3 MILLILITER(S): .5; 2.5 SOLUTION RESPIRATORY (INHALATION) at 11:22

## 2025-03-05 RX ADMIN — LEVETIRACETAM 750 MILLIGRAM(S): 10 INJECTION, SOLUTION INTRAVENOUS at 17:52

## 2025-03-05 RX ADMIN — SEVELAMER HYDROCHLORIDE 1600 MILLIGRAM(S): 800 TABLET ORAL at 22:09

## 2025-03-05 RX ADMIN — ERYTHROMYCIN 1 APPLICATION(S): 5 OINTMENT OPHTHALMIC at 17:04

## 2025-03-05 RX ADMIN — LURASIDONE HYDROCHLORIDE 20 MILLIGRAM(S): 120 TABLET, FILM COATED ORAL at 22:08

## 2025-03-05 NOTE — PROGRESS NOTE ADULT - PROBLEM SELECTOR PLAN 3
-CT chest with b/l GGO, new since 1/30/25. Likely COVID PNA +/- superimposed bacterial PNA  -Continue ABX.  2/27 : on antibiotics:  2/28: remains intubated and sedated  on vasopressors:  for septic shock  3/1: as above  3/2: off antibiotics now:  3/3; being monitored off antibiotics;  3/4: off antibiotics  3/5: finished antibiotics

## 2025-03-05 NOTE — PROGRESS NOTE ADULT - ASSESSMENT
69 year old male with a past medical history of hypertension, hyperlipemia VAZQUEZ (on CPAP at bedtime, NC 2 liters during the day), CKD stage 3, epilepsy, schizophrenia, developmental delay, metastatic testicular cancer (s/p orchiectomy, actively on chemotherapy, last dose of etoposide/cisplatin on 6/2024), presenting with acute on chronic hypoxemic respiratory failure, secondary to (a) COVID-19 infection, (b) superimposed pneumonia after recent influenza infection, and/or (c) fluid overload.     MICU consulted and accepted after RRT due to increased work of breathing    NEURO:  #Mental status:  - Off precedex  - Baseline A&O2-3    #Epilepsy:  - Continue with home meds which include Lacosamide, Lurasidone, Lamotrigine - increased to 50 BID  - EEG negative    #Brain Mets:  - MRI brain now with 5.4 mm enhancing lesion in the LEFT middle cerebellar peduncle with associated edema suspicious for metastases    # Schizophrenia  - Lurasidone halved to reduce oversedation    CV:  #Echo:  Recent echo last admission showing Left ventricular cavity is normal in size. Left ventricular systolic function is normal with an ejection fraction of 62 %. There are no regional wall motion abnormalities seen. Normal right ventricular cavity size and normal right ventricular systolic function.   - Repeat echo continuing to show EF 70%    #Afib w/RVR  - New onset Afib RVR (on tele, confirmed with EKG 2/19)   - Plan: Started metoprolol 5mg IVP q6 hours & Hep gtt -> metoprolol held iso hypotension  - If rate remains uncontrolled, can start Cardizem drip at 5mg/h  - c/w Heparin drip    #HTN:  - On Hydralazine 25mg TID, Amlodipine 5mg, held iso current hypotension    PULM:  #Vent   - Due to increased work of breathing, now intubated  - SBT as tolerated  - Possible extubation in the next day or so if secretions managaable    #AHRF  - Patient admitted to the hospital for AHRF, found to be COVID (+)  - Likely 2/2 PNA, does not appear to CHF decompensation  - Continue with airway clearance regimen - duoneb, hypertonic saline, chest PT  - CT chest with b/l GGOs, possible early COVID fibrosis, area of consolidation ?infectious vs. atelectasis    RENAL:  #SHAGUFTA:   - Hx of CKD stage 3, Cr 2.38 at baseline, seems to be plateauing at 4.24  - In setting of COVID (+)  - Pre-renal, FeUrea 29%  - Monitor Cr  - Avoid nephrotoxic meds  - Wakefield in place  - Fluid challenge - albumin and blood given with improvement in renal function, will start on 50 cc LR    GI:  No acute issues     #Diet: Tube feeds via OG tube  #Bowel Regimen  - On Senna and Miralax    ENDO:  No acute issues  Thyroid nodule noted in prior notes    HEMATOLOGIC:  #Thrombocytopenic  - Unclear origin  - Concern for possible mets to bone marrow? vs due to infection  - Continue to monitor  - Transfuse to maintain Plt>10K unless actively bleeding then maintain >50K  - Heme-Onc recs appreciated    #Anemia  - CKD vs. cancer vs. sepsis  - 1 unit PRBCs given  - CTM CBC  - No sign of bleeding    #DVT prophylaxis   - SCD    ID:  #COVID-19  - Concern for possible superimposed PNA   - S/p zosyn 2/16- 2/28  - S/p Remdesivir  - Blood cx negative    SKIN:  #Lines:  2x PIV    Ethics:  - Full Code  - Contact: Sister Ester [phone]  69 year old male with a past medical history of hypertension, hyperlipemia VAZQUEZ (on CPAP at bedtime, NC 2 liters during the day), CKD stage 3, epilepsy, schizophrenia, developmental delay, metastatic testicular cancer (s/p orchiectomy, actively on chemotherapy, last dose of etoposide/cisplatin on 6/2024), presenting with acute on chronic hypoxemic respiratory failure, secondary to (a) COVID-19 infection, (b) superimposed pneumonia after recent influenza infection, and/or (c) fluid overload.     MICU consulted and accepted after RRT due to increased work of breathing    NEURO:  #Mental status:  - Off precedex  - Back to baseline mental status    #Epilepsy:  - Continue with home meds which include Lacosamide, Lurasidone, Lamotrigine - increased to 50 BID  - EEG negative    #Brain Mets:  - MRI brain now with 5.4 mm enhancing lesion in the LEFT middle cerebellar peduncle with associated edema suspicious for metastases    # Schizophrenia  - Lurasidone halved to reduce oversedation    CV:  #Echo:  Recent echo last admission showing Left ventricular cavity is normal in size. Left ventricular systolic function is normal with an ejection fraction of 62 %. There are no regional wall motion abnormalities seen. Normal right ventricular cavity size and normal right ventricular systolic function.   - Repeat echo continuing to show EF 70%    #Afib w/RVR  - New onset Afib RVR (on tele, confirmed with EKG 2/19)   - Plan: Started metoprolol 5mg IVP q6 hours & Hep gtt -> metoprolol restarted  - If rate remains uncontrolled, can start Cardizem drip at 5mg/h  - c/w Heparin drip    #HTN:  - On Hydralazine 25mg TID, Amlodipine 5mg, held iso current hypotension    PULM:  #Vent   - Extubated 3/4    #AHRF  - Patient admitted to the hospital for AHRF, found to be COVID (+)  - Likely 2/2 PNA, does not appear to CHF decompensation  - Continue with airway clearance regimen - duoneb, hypertonic saline, chest PT  - CT chest with b/l GGOs, possible early COVID fibrosis, area of consolidation ?infectious vs. atelectasis    RENAL:  #SHAGUFTA:   - Hx of CKD stage 3, Cr 2.38 at baseline, seems to be plateauing at 4.24  - In setting of COVID (+)  - Pre-renal, FeUrea 29%  - Monitor Cr  - Avoid nephrotoxic meds  - Wakefield in place  - Fluid challenge - albumin and blood given with improvement in renal function, will continue on 50 cc LR    GI:  No acute issues     #Diet: Tube feeds via OG tube  #Bowel Regimen  - On Senna and Miralax    ENDO:  No acute issues  Thyroid nodule noted in prior notes    HEMATOLOGIC:  #Thrombocytopenic  - Unclear origin  - Concern for possible mets to bone marrow? vs due to infection  - Continue to monitor  - Transfuse to maintain Plt>10K unless actively bleeding then maintain >50K  - Heme-Onc recs appreciated    #Anemia  - CKD vs. cancer vs. sepsis  - 1 unit PRBCs given  - CTM CBC  - No sign of bleeding    #DVT prophylaxis   - SCD    ID:  #COVID-19  - Concern for possible superimposed PNA   - S/p zosyn 2/16- 2/28  - S/p Remdesivir  - Blood cx negative    SKIN:  #Lines:  2x PIV    Ethics:  - Full Code  - Contact: Sister Ester [phone]

## 2025-03-05 NOTE — PROGRESS NOTE ADULT - ASSESSMENT
Mr. Gr is a 69 year old male with PMHx of seizure disorder, sleep apnea, HTN, chronic idiopathic constipation, stage III CKD, severe obesity, secondary hyperparathyroidism, anemia, thrombocytopenia, hyperlipidemia, testicular cancer under treatment with Dr. Ovalles who is admitted for acute hypoxic respiratory failure secondary to COVID vs superimposed pneumonia with new onset thrombocytopenia. He was recently discharged 02/06/2025 after a tenous stay including intubation in the ICU for respiratory failure in setting of seizure. He had recovered and was discharged to rehab.      Former smoker, quit 14y prior, denied ETOH, drug use. Lives at home. Does not have children. Denies family history of blood disorders or malignancy.  Denies previous workplace related exposures.  Denies previous history of blood transfusions.  Denied history of thromboses.  Denied history of  requiring anticoagulation.     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  Margins were negative.  pT3Nx. CT C/A/P with few left para-aortic and left iliac chain lymph nodes largest measuring 8.1 cm left para-aortic node, bilateral subcentimeter noncalcified pulmonary nodules measuring up to 7 mm. AFP, LDH, hCG were all elevated. 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 Started on adjuvant therapy with Cisplatin+Etoposide, so far completed 4 cycles of treatment with cisplatin dose reduced 50% and Etoposide 50% due to thrombocytopenia.      02/19/2025: on HFNC, noted tachycardia and fever, Klebsiella in urine as well, thrombocytopenia stable/improving, no signs of active bleeding     02/20/2025: developed A.fib with RVR and was placed on heparin drip and pending cardiology eval    02/21/2025: awake, on AVAPS overnight, noted RRT for hypoxia as pt removed HFNC at some point in time, good response thereafter     02/22/2025:  continues on AVAPS this morning, noted RRT overnight for hypoxia, hypercapnia, ICU following, US LE negative for DVT, counts overall stable/improved     02/23/2025: progressive respiratory failure, noted ongoing RTT this morning with pending intubation and transfer to MICU     02/24/2025: intubated 02/23/2025, sedated, on pressor support, no signs of bleeding, counts noted, no signs of bleeding     02/25/2025: continues in MICU, intubated and sedated, no signs of bleeding    02/26/2025: continues in MICU, intubated, without signs of bleeding, continues on heparin drip     02/27/2025:  remains under the care of the ICU, intubated, no signs of bleeding and continues on heparin drip, counts stable, has not required PRBC support this admission    02/28/2025: continues under the care of MICU, continues intubated, no signs of bleeding, on heparin drip    03/01/2025: continues in MICU, intubated, direct josefina IgG positive, eluate negative, not likely to be clinically significant     03/02/2025:  continues in MICU, nursing staff at bedside, no overnight events noted. CTH without acute intracranial pathology     03/03/2025: continues in MICU, intubated, on heparin drip, 1u PRBC overnight, no signs of bleeding, platelet count stable     03/04/2025: awake, moving arms spontaneously on exam, continues without much interaction however. No signs of bleeding, continues heparin drip, continues intubated in MICU       #Acute hypoxic respiratory failure  # COVID  - CT noted with bilateral GGO  - ID following  - Pulmonary following  - Antibiotics per primary team/MICU and ID   - Intubated 02/23/2025 AM, MICU     # Thrombocytopenia   - Did occur during most recent admission and improved to normal prior to discharge   - Without signs of bleeding  - Could be multifactorial, possibly due to infection   - Continue to trend  - Transfuse to maintain Plt > 10k unless actively bleeding then maintain > 50k     # Brain lesion  - pt with hx of seizures  - MRI brain now with 5.4 mm enhancing lesion in the LEFT middle cerebellar peduncle with associated edema suspicious for metastases  - MRI Lumbar negative for acute disease  - Small lesion without mass effect or edema, recommended to correlate per neurology if foci and locus are concordant with seizure activity  - Neurology recs noted, new lesion not likely to cause seizure  - It is rare, but nonetheless not impossible, for testicular cancer to be metastatic to the brain  - He will need another PET-CT, can be completed outpatient once stable if concern can proceed with biopsy at that time   - Previous endocrinology eval for thyroid nodule noted  - AFP/[facility] normal,  previously   - Repeat CTH without acute intracranial pathology       # 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 Ovalles, agree with current plan  - Follow up as outpatient with Franki Ovalles MD     # Acute kidney injury on CKD Stage IIIA  - Likely multifactorial  - Nephrology consult and recs noted  - Continue to trend     # Normocytic anemia  - Chronic, has hx of PRBC during chemo 07/2024  - Noted Anti E, Anti-K Anti-C antibodies previously  - direct josefina IgG positive, eluate negative, not likely to be clinically significant   - s/p 1u PRBC 03/03/2025   - Monitor for bleeding  - Recommend to transfuse to maintain Hb > 7    # Klebsiella UTI  -Antibiotics per ID and primary team       Recommendations  - Trend CBC daily   - Transfuse to maintain Hb > 7   - Transfuse to maintain Plt >10k unless active bleeding then >50k   - Monitor renal function  - Antibiotics per primary team/MICU and ID   - Cardiology follow up to address anticoagulation, currently on heparin drip   - f/u ongoing ICU recs         Thank you for allowing me to participate in the care of Mr. Gr please do not hesitate to call or text me if you have further questions or concerns.     Brayan Mart MD  [facility] [place]   Division of Hematology/Oncology  [address], [address]  [place] [address]  P: [phone]  F: [phone]    Attestation:    ----Pt evaluated including face-to-face interaction in addition to chart review, reviewing treatment plan, and managing the patient’s chronic diagnoses as listed in the assessment----        Mr. Gr is a 69 year old male with PMHx of seizure disorder, sleep apnea, HTN, chronic idiopathic constipation, stage III CKD, severe obesity, secondary hyperparathyroidism, anemia, thrombocytopenia, hyperlipidemia, testicular cancer under treatment with Dr. Ovalles who is admitted for acute hypoxic respiratory failure secondary to COVID vs superimposed pneumonia with new onset thrombocytopenia. He was recently discharged 02/06/2025 after a tenous stay including intubation in the ICU for respiratory failure in setting of seizure. He had recovered and was discharged to rehab.      Former smoker, quit 14y prior, denied ETOH, drug use. Lives at home. Does not have children. Denies family history of blood disorders or malignancy.  Denies previous workplace related exposures.  Denies previous history of blood transfusions.  Denied history of thromboses.  Denied history of  requiring anticoagulation.     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  Margins were negative.  pT3Nx. CT C/A/P with few left para-aortic and left iliac chain lymph nodes largest measuring 8.1 cm left para-aortic node, bilateral subcentimeter noncalcified pulmonary nodules measuring up to 7 mm. AFP, LDH, hCG were all elevated. 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 Started on adjuvant therapy with Cisplatin+Etoposide, so far completed 4 cycles of treatment with cisplatin dose reduced 50% and Etoposide 50% due to thrombocytopenia.      02/19/2025: on HFNC, noted tachycardia and fever, Klebsiella in urine as well, thrombocytopenia stable/improving, no signs of active bleeding     02/20/2025: developed A.fib with RVR and was placed on heparin drip and pending cardiology eval    02/21/2025: awake, on AVAPS overnight, noted RRT for hypoxia as pt removed HFNC at some point in time, good response thereafter     02/22/2025:  continues on AVAPS this morning, noted RRT overnight for hypoxia, hypercapnia, ICU following, US LE negative for DVT, counts overall stable/improved     02/23/2025: progressive respiratory failure, noted ongoing RTT this morning with pending intubation and transfer to MICU     02/24/2025: intubated 02/23/2025, sedated, on pressor support, no signs of bleeding, counts noted, no signs of bleeding     02/25/2025: continues in MICU, intubated and sedated, no signs of bleeding    02/26/2025: continues in MICU, intubated, without signs of bleeding, continues on heparin drip     02/27/2025:  remains under the care of the ICU, intubated, no signs of bleeding and continues on heparin drip, counts stable, has not required PRBC support this admission    02/28/2025: continues under the care of MICU, continues intubated, no signs of bleeding, on heparin drip    03/01/2025: continues in MICU, intubated, direct josefina IgG positive, eluate negative, not likely to be clinically significant     03/02/2025:  continues in MICU, nursing staff at bedside, no overnight events noted. CTH without acute intracranial pathology     03/03/2025: continues in MICU, intubated, on heparin drip, 1u PRBC overnight, no signs of bleeding, platelet count stable     03/04/2025: awake, moving arms spontaneously on exam, continues without much interaction however. No signs of bleeding, continues heparin drip, continues intubated in MICU     03/05/2025: extubated 03/04/2025, continues in MICU, awake and alert this morning and able to speak full sentences, discussed/reviewed findings, continues on heparin drip       #Acute hypoxic respiratory failure  # COVID  - CT noted with bilateral GGO  - ID following  - Pulmonary following  - Antibiotics per primary team/MICU and ID   - Intubated 02/23/2025 AM, MICU   - extubated 03/04/2025    # Thrombocytopenia   - Did occur during most recent admission and improved to normal prior to discharge   - Without signs of bleeding  - Could be multifactorial, possibly due to infection   - Continue to trend  - Transfuse to maintain Plt > 10k unless actively bleeding then maintain > 50k     # Brain lesion  - pt with hx of seizures  - MRI brain now with 5.4 mm enhancing lesion in the LEFT middle cerebellar peduncle with associated edema suspicious for metastases  - MRI Lumbar negative for acute disease  - Small lesion without mass effect or edema, recommended to correlate per neurology if foci and locus are concordant with seizure activity  - Neurology recs noted, new lesion not likely to cause seizure  - It is rare, but nonetheless not impossible, for testicular cancer to be metastatic to the brain  - He will need another PET-CT, can be completed outpatient once stable if concern can proceed with biopsy at that time   - Previous endocrinology eval for thyroid nodule noted  - AFP/BHCH normal,  previously   - Repeat CTH without acute intracranial pathology       # 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 Ovalles, agree with current plan  - Follow up as outpatient with Franki Ovalles MD     # Acute kidney injury on CKD Stage IIIA  - Likely multifactorial  - Nephrology consult and recs noted  - Continue to trend     # Normocytic anemia  - Chronic, has hx of PRBC during chemo 07/2024  - Noted Anti E, Anti-K Anti-C antibodies previously  - direct josefina IgG positive, eluate negative, not likely to be clinically significant   - s/p 1u PRBC 03/03/2025   - Monitor for bleeding  - Recommend to transfuse to maintain Hb > 7    # Klebsiella UTI  -Antibiotics per ID and primary team       Recommendations  - Trend CBC daily   - Transfuse to maintain Hb > 7   - Transfuse to maintain Plt >10k unless active bleeding then >50k   - Monitor renal function  - Antibiotics per primary team/MICU and ID   - Cardiology follow up to address anticoagulation, currently on heparin drip   - f/u ongoing ICU recs         Thank you for allowing me to participate in the care of Mr. Gr please do not hesitate to call or text me if you have further questions or concerns.     Brayan Mart MD  [facility] [place]   Division of Hematology/Oncology  [address], [address]  [place] [address]  P: [phone]  F: [phone]    Attestation:    ----Pt evaluated including face-to-face interaction in addition to chart review, reviewing treatment plan, and managing the patient’s chronic diagnoses as listed in the assessment----

## 2025-03-05 NOTE — PROGRESS NOTE ADULT - SUBJECTIVE AND OBJECTIVE BOX
[facility]   [phone]  RENAL FOLLOW UP NOTE  --------------------------------------------------------------------------------  Chief Complaint:    24 hour events/subjective:    seen earlier   extubated   responsive but overall fatigue    PAST HISTORY  --------------------------------------------------------------------------------  No significant changes to PMH, PSH, FHx, SHx, unless otherwise noted    ALLERGIES & MEDICATIONS  --------------------------------------------------------------------------------  Allergies    seasonal allergies (Unknown)  penicillin (Hives)    Intolerances    latex (Rash)    Standing Inpatient Medications  albuterol/ipratropium for Nebulization 3 milliLiter(s) Nebulizer every 6 hours  atorvastatin 20 milliGRAM(s) Oral at bedtime  cholecalciferol 1000 Unit(s) Oral daily  erythromycin   Ointment 1 Application(s) Both EYES four times a day  escitalopram 15 milliGRAM(s) Oral with breakfast  gabapentin Solution 150 milliGRAM(s) Oral every 12 hours  heparin  Infusion. 1700 Unit(s)/Hr IV Continuous <Continuous>  influenza  Vaccine (HIGH DOSE) 0.5 milliLiter(s) IntraMuscular once  lacosamide IVPB 200 milliGRAM(s) IV Intermittent every 12 hours  lactated ringers. 1000 milliLiter(s) IV Continuous <Continuous>  lamoTRIgine 50 milliGRAM(s) Oral two times a day  levETIRAcetam   Injectable 750 milliGRAM(s) IV Push every 12 hours  lurasidone 20 milliGRAM(s) Oral at bedtime  metoprolol tartrate 12.5 milliGRAM(s) Oral two times a day  ofloxacin 0.3% Solution 1 Drop(s) Right EYE every 4 hours  pantoprazole  Injectable 40 milliGRAM(s) IV Push daily  polyethylene glycol 3350 17 Gram(s) Oral daily  senna Syrup 10 milliLiter(s) Oral at bedtime  sevelamer carbonate Powder 1600 milliGRAM(s) Oral every 8 hours  sodium chloride 3%  Inhalation 4 milliLiter(s) Inhalation every 6 hours    PRN Inpatient Medications  artificial tears (preservative free) Ophthalmic Solution 1 Drop(s) Both EYES every 6 hours PRN      REVIEW OF SYSTEMS  --------------------------------------------------------------------------------    Gen: denies  fevers/chills, or HA or dizziness  or sorethroat  CVS: denies chest pain/palpitations  Resp: denies SOB/Cough  GI: Denies N/V/Abd pain  : Denies dysuria     VITALS/PHYSICAL EXAM  --------------------------------------------------------------------------------  T(C): 37.4 (03-05-25 @ 20:00), Max: 37.5 (03-05-25 @ 04:00)  HR: 94 (03-05-25 @ 20:00) (87 - 114)  BP: 123/72 (03-05-25 @ 20:00) (119/58 - 178/76)  RR: 25 (03-05-25 @ 20:00) (20 - 35)  SpO2: 99% (03-05-25 @ 20:00) (89% - 100%)  Wt(kg): --        03-04-25 @ 07:01  -  03-05-25 @ 07:00  --------------------------------------------------------  IN: 1891 mL / OUT: 1845 mL / NET: 46 mL    03-05-25 @ 07:01  -  03-05-25 @ 20:27  --------------------------------------------------------  IN: 1170 mL / OUT: 785 mL / NET: 385 mL      Physical Exam:  	  Gen: ill appearing male, awake responsive   	Pulm: decrease bs, +coarse   	CV: No JVD. RRR, S1S2; no rub  	Abd: +BS, soft, nondistended  	UE: Warm, no cyanosis  no clubbing,  no edema;  	LE: Warm, no cyanosis  no clubbing, no edema      LABS/STUDIES  --------------------------------------------------------------------------------              8.0    8.84  >-----------<  142      [03-05-25 @ 00:15]              25.8     140  |  100  |  124  ----------------------------<  112      [03-05-25 @ 00:15]  4.2   |  24  |  3.31        Ca     9.3     [03-05-25 @ 00:15]      Mg     3.0     [03-05-25 @ 00:15]      Phos  6.3     [03-05-25 @ 00:15]    TPro  7.0  /  Alb  3.1  /  TBili  0.4  /  DBili  x   /  AST  34  /  ALT  32  /  AlkPhos  133  [03-05-25 @ 00:15]    PT/INR: PT 11.5 , INR 1.01       [03-05-25 @ 00:15]  PTT: 63.4       [03-05-25 @ 13:31]      Creatinine Trend:  SCr 3.31 [03-05 @ 00:15]  SCr 3.99 [03-04 @ 00:26]  SCr 4.34 [03-03 @ 00:01]  SCr 4.06 [03-02 @ 12:01]  SCr 4.11 [03-02 @ 03:48]            Ferritin [id number]      [02-23-25 @ 06:15]  TSH 0.87      [01-25-25 @ 11:31]

## 2025-03-05 NOTE — PROGRESS NOTE ADULT - NUTRITIONAL ASSESSMENT
This patient has been assessed with a concern for Malnutrition and has been determined to have a diagnosis/diagnoses of Severe protein-calorie malnutrition.    This patient is being managed with:   Diet NPO-  Except Medications  Entered: Mar  4 2025  6:15PM

## 2025-03-05 NOTE — PROGRESS NOTE ADULT - ASSESSMENT
69 year old male with a past medical history of hypertension, hyperlipemia VAZQUEZ (on CPAP at bedtime, NC 2 liters during the day), CKD stage 3, epilepsy, schizophrenia, developmental delay, metastatic testicular cancer (s/p orchiectomy, actively on chemotherapy, last dose of etoposide/cisplatin on 6/2024) presenting with worsening shortness of breath. Patient was recently hospitalized at [facility] from January 27th 2025 to February 6th 2025 for seizure and influenza virus infection, which required intubation. He was sent to rehab (originally from home) from hospital. He returns due to sudden onset shortness of breath and worsening oxygenation. Of note, he tested positive for COVID-19       1- SHAGUFTA on CKDIII  2- covid-19  3- anemia  4- hypotension  bp is better   5- respiratory failure         SHAGUFTA suspected in setting of new infection. covid 19   cr and bun high but seem to be stabilizing after iv alb   trend cr and lytes   hold diuretics   bun is quite high cr is slightly better re eval in am   hyperphosphatemia   renvela 1600 mg tid feeding tube   anemia, trend hb  vent wean

## 2025-03-05 NOTE — PROGRESS NOTE ADULT - NSPROGADDITIONALINFOA_GEN_ALL_CORE
speech & swallow eval appreciated, s/p FEES: recommends thicken pureed diet.  events reviewed, RRT for hypoxia, intubated/sedated for increased work of breathing.   transferred to ICU. ICU eval appreciated  GOC discussed, full code per the sister.   Bumex gtt for overload, now off. weaned off pressors.   completed abx, completed antiviral.   ICU care appreciated.   vEEG per ICU team: no seizures.  s/p 1 unit prbc overnight, monitor hgb.   extubated 3/4/25.  on NC.  physical therapy. monitor cough. PRN antitussives.   daily xrays.  abx if suspicion of recurrent PNA.   ID following.     - Dr. SHIRA Meltonet ([facility])  - [phone]

## 2025-03-05 NOTE — PROGRESS NOTE ADULT - ATTENDING COMMENTS
1. Acute  hypoxemic  respiratory failure in setting of Covid with new superimposed  pneumonia. Likely aspiration pneumonia.. Pt finished course of treatment  for covid and pneumonia. Pt tolerating extubation but still requires frequent suctioning  of secretions. Tolerating nasal cannula.    2. Renal. Acute renal failure , Pre renal . Over diuresis.  Good UO and decrease creatinine. with  LR at 75 cc/hr  for 24 hrs. Continue  fo additional 24 hrs.    3. Neuro. starting to wake up. Opens eyes to name. Decrease Precedex as tolerated. EEG negative. Stop EEG.'  Continue current AEDS regimen. Pt alert and following commands.    4.Cardiac. Chronic atrial fib. Continue  IV heparin Rate  IV lopressor as needed.  Restart metoprolol  12.5 bid.    5..DVT prophylaxis: IV heparin    6. GOC; Full code.    7. Psych. Schizophrenia: Continue  current psych  regimen

## 2025-03-05 NOTE — PROGRESS NOTE ADULT - SUBJECTIVE AND OBJECTIVE BOX
SUBJECTIVE/ OVERNIGHT EVENTS:  s/p extubation  awake  comfortable  +cough  answer questions  denied pain      --------------------------------------------------------------------------------------------  LABS:                        8.0    8.84  )-----------( 142      ( 05 Mar 2025 00:15 )             25.8     03-05    140  |  100  |  124[H]  ----------------------------<  112[H]  4.2   |  24  |  3.31[H]    Ca    9.3      05 Mar 2025 00:15  Phos  6.3     03-05  Mg     3.0     03-05    TPro  7.0  /  Alb  3.1[L]  /  TBili  0.4  /  DBili  x   /  AST  34  /  ALT  32  /  AlkPhos  133[H]  03-05    PT/INR - ( 05 Mar 2025 00:15 )   PT: 11.5 sec;   INR: 1.01 ratio         PTT - ( 05 Mar 2025 06:37 )  PTT:72.0 sec  CAPILLARY BLOOD GLUCOSE            Urinalysis Basic - ( 05 Mar 2025 00:15 )    Color: x / Appearance: x / SG: x / pH: x  Gluc: 112 mg/dL / Ketone: x  / Bili: x / Urobili: x   Blood: x / Protein: x / Nitrite: x   Leuk Esterase: x / RBC: x / WBC x   Sq Epi: x / Non Sq Epi: x / Bacteria: x        RADIOLOGY & ADDITIONAL TESTS:    Imaging Personally Reviewed:  [x] YES  [ ] NO    Consultant(s) Notes Reviewed:  [x] YES  [ ] NO    MEDICATIONS  (STANDING):  albuterol/ipratropium for Nebulization 3 milliLiter(s) Nebulizer every 6 hours  atorvastatin 20 milliGRAM(s) Oral at bedtime  cholecalciferol 1000 Unit(s) Oral daily  erythromycin   Ointment 1 Application(s) Both EYES four times a day  escitalopram 15 milliGRAM(s) Oral with breakfast  gabapentin Solution 150 milliGRAM(s) Oral every 12 hours  heparin  Infusion. 1700 Unit(s)/Hr (17 mL/Hr) IV Continuous <Continuous>  influenza  Vaccine (HIGH DOSE) 0.5 milliLiter(s) IntraMuscular once  lacosamide IVPB 200 milliGRAM(s) IV Intermittent every 12 hours  lactated ringers. 1000 milliLiter(s) (50 mL/Hr) IV Continuous <Continuous>  lamoTRIgine 50 milliGRAM(s) Oral two times a day  levETIRAcetam   Injectable 750 milliGRAM(s) IV Push every 12 hours  lurasidone 20 milliGRAM(s) Oral at bedtime  metoprolol tartrate 12.5 milliGRAM(s) Oral two times a day  naloxegol 12.5 milliGRAM(s) Oral daily  pantoprazole  Injectable 40 milliGRAM(s) IV Push daily  polyethylene glycol 3350 17 Gram(s) Oral daily  senna Syrup 10 milliLiter(s) Oral at bedtime  sevelamer carbonate Powder 1600 milliGRAM(s) Oral every 8 hours  sodium chloride 3%  Inhalation 4 milliLiter(s) Inhalation every 6 hours    MEDICATIONS  (PRN):  artificial tears (preservative free) Ophthalmic Solution 1 Drop(s) Both EYES every 6 hours PRN Dry Eyes      Care Discussed with Consultants/Other Providers [x] YES  [ ] NO    Vital Signs Last 24 Hrs  T(C): 37.2 (05 Mar 2025 08:00), Max: 37.5 (05 Mar 2025 04:00)  T(F): 99 (05 Mar 2025 08:00), Max: 99.5 (05 Mar 2025 04:00)  HR: 108 (05 Mar 2025 08:00) (69 - 114)  BP: 144/71 (05 Mar 2025 08:00) (119/58 - 178/76)  BP(mean): 99 (05 Mar 2025 08:00) (83 - 114)  RR: 28 (05 Mar 2025 08:00) (18 - 36)  SpO2: 99% (05 Mar 2025 08:00) (91% - 100%)    Parameters below as of 05 Mar 2025 08:00  Patient On (Oxygen Delivery Method): nasal cannula  O2 Flow (L/min): 4    I&O's Summary    04 Mar 2025 07:01  -  05 Mar 2025 07:00  --------------------------------------------------------  IN: 1891 mL / OUT: 1845 mL / NET: 46 mL    05 Mar 2025 07:01  -  05 Mar 2025 10:13  --------------------------------------------------------  IN: 145 mL / OUT: 75 mL / NET: 70 mL        PHYSICAL EXAM:  GENERAL: comfortable, awake, on NC, +intermittent cough.   HEAD:  Atraumatic, Normocephalic  EYES: EOMI, PERRLA, conjunctiva and sclera clear  NECK: Supple, No JVD  CHEST/LUNG: mild decrease breath sounds bilaterally; No wheeze   HEART: Regular rate and rhythm; No murmurs, rubs, or gallops  ABDOMEN: Soft, Nontender, Nondistended; Bowel sounds present  Neuro: awake. able to move extremities.   EXTREMITIES:  2+ Peripheral Pulses, No clubbing, cyanosis, or edema  SKIN: No rashes or lesions

## 2025-03-05 NOTE — PROGRESS NOTE ADULT - PROBLEM SELECTOR PLAN 7
-By hx, w/ CPAP at home.    2/28: he remains intubated in MICU  3/2: om weaning trials:  3/4 : cont weaning trials  3/5: extubated  : doing good at this t aide

## 2025-03-05 NOTE — PROGRESS NOTE ADULT - PROBLEM SELECTOR PLAN 5
-By hx  -Continue Keppra.  MRI showed :  Mild periventricular and moderate deep and subcortical white   matter ischemia. 5.4 mm enhancing lesion in the LEFT middle cerebellar   peduncle with associated edema suspicious for metastases.    Marked LEFT   and mild RIGHT mucosal thickening within the BILATERAL mastoid air cells.  defer to primaryt  \eam    3/3: getting eeg today  3/4: per neurology  3/5: no sz noted in MICU

## 2025-03-05 NOTE — PROGRESS NOTE ADULT - SUBJECTIVE AND OBJECTIVE BOX
Date of Service: 03-05-25 @ 16:21    Patient is a 69y old  Male who presents with a chief complaint of Acute on chronic hypoxemic respiratory failure (05 Mar 2025 10:12)      Any change in ROS: extubated and is alert and awake and is responsive       MEDICATIONS  (STANDING):  albuterol/ipratropium for Nebulization 3 milliLiter(s) Nebulizer every 6 hours  atorvastatin 20 milliGRAM(s) Oral at bedtime  cholecalciferol 1000 Unit(s) Oral daily  erythromycin   Ointment 1 Application(s) Both EYES four times a day  escitalopram 15 milliGRAM(s) Oral with breakfast  gabapentin Solution 150 milliGRAM(s) Oral every 12 hours  heparin  Infusion. 1700 Unit(s)/Hr (17 mL/Hr) IV Continuous <Continuous>  influenza  Vaccine (HIGH DOSE) 0.5 milliLiter(s) IntraMuscular once  lacosamide IVPB 200 milliGRAM(s) IV Intermittent every 12 hours  lactated ringers. 1000 milliLiter(s) (50 mL/Hr) IV Continuous <Continuous>  lamoTRIgine 50 milliGRAM(s) Oral two times a day  levETIRAcetam   Injectable 750 milliGRAM(s) IV Push every 12 hours  lurasidone 20 milliGRAM(s) Oral at bedtime  metoprolol tartrate 12.5 milliGRAM(s) Oral two times a day  ofloxacin 0.3% Solution 1 Drop(s) Right EYE every 4 hours  pantoprazole  Injectable 40 milliGRAM(s) IV Push daily  polyethylene glycol 3350 17 Gram(s) Oral daily  senna Syrup 10 milliLiter(s) Oral at bedtime  sevelamer carbonate Powder 1600 milliGRAM(s) Oral every 8 hours  sodium chloride 3%  Inhalation 4 milliLiter(s) Inhalation every 6 hours    MEDICATIONS  (PRN):  artificial tears (preservative free) Ophthalmic Solution 1 Drop(s) Both EYES every 6 hours PRN Dry Eyes    Vital Signs Last 24 Hrs  T(C): 37.4 (05 Mar 2025 16:00), Max: 37.5 (05 Mar 2025 04:00)  T(F): 99.3 (05 Mar 2025 16:00), Max: 99.5 (05 Mar 2025 04:00)  HR: 105 (05 Mar 2025 16:00) (69 - 114)  BP: 157/72 (05 Mar 2025 16:00) (119/58 - 178/76)  BP(mean): 103 (05 Mar 2025 16:00) (83 - 109)  RR: 25 (05 Mar 2025 16:00) (20 - 35)  SpO2: 100% (05 Mar 2025 16:00) (89% - 100%)    Parameters below as of 05 Mar 2025 11:42  Patient On (Oxygen Delivery Method): nasal cannula        I&O's Summary    04 Mar 2025 07:01  -  05 Mar 2025 07:00  --------------------------------------------------------  IN: 1891 mL / OUT: 1845 mL / NET: 46 mL    05 Mar 2025 07:01  -  05 Mar 2025 16:21  --------------------------------------------------------  IN: 755 mL / OUT: 555 mL / NET: 200 mL          Physical Exam:   GENERAL: NAD, well-groomed, well-developed  HEENT: BREE/   Atraumatic, Normocephalic  ENMT: No tonsillar erythema, exudates, or enlargement; Moist mucous membranes, Good dentition, No lesions  NECK: Supple, No JVD, Normal thyroid  CHEST/LUNG: Clear to auscultaion- no wheezing  CVS: Regular rate and rhythm; No murmurs, rubs, or gallops  GI: : Soft, Nontender, Nondistended; Bowel sounds present  NERVOUS SYSTEM:  Alert & awake on o xygen and seems to respond to simple commands   EXTREMITIES:  - edema  LYMPH: No lymphadenopathy noted  SKIN: No rashes or lesions  ENDOCRINOLOGY: No Thyromegaly  PSYCH: Appropriate    Labs:  ABG - ( 05 Mar 2025 01:05 )  pH, Arterial: 7.38  pH, Blood: x     /  pCO2: 47    /  pO2: 91    / HCO3: 28    / Base Excess: 2.3   /  SaO2: 97.6            44, 40.0                            8.0    8.84  )-----------( 142      ( 05 Mar 2025 00:15 )             25.8                         7.3    8.48  )-----------( 109      ( 04 Mar 2025 00:40 )             22.8                         7.4    7.06  )-----------( 115      ( 03 Mar 2025 12:41 )             22.5                         6.8    7.14  )-----------( 108      ( 03 Mar 2025 00:57 )             21.9                         5.5    8.02  )-----------( 117      ( 03 Mar 2025 00:01 )             18.0                         7.0    9.10  )-----------( 112      ( 02 Mar 2025 12:01 )             22.4                         7.7    9.10  )-----------( 116      ( 02 Mar 2025 03:49 )             24.3     03-05    140  |  100  |  124[H]  ----------------------------<  112[H]  4.2   |  24  |  3.31[H]  03-04    140  |  96  |  126[H]  ----------------------------<  146[H]  4.0   |  23  |  3.99[H]  03-03    137  |  94[L]  |  121[H]  ----------------------------<  156[H]  4.2   |  22  |  4.34[H]  03-02    139  |  95[L]  |  115[H]  ----------------------------<  145[H]  3.7   |  22  |  4.06[H]  03-02    139  |  95[L]  |  117[H]  ----------------------------<  165[H]  3.4[L]   |  22  |  4.11[H]    Ca    9.3      05 Mar 2025 00:15  Ca    9.1      04 Mar 2025 00:26  Phos  6.3     03-05  Phos  9.1     03-04  Mg     3.0     03-05  Mg     2.9     03-04    TPro  7.0  /  Alb  3.1[L]  /  TBili  0.4  /  DBili  x   /  AST  34  /  ALT  32  /  AlkPhos  133[H]  03-05  TPro  6.8  /  Alb  2.9[L]  /  TBili  0.3  /  DBili  x   /  AST  34  /  ALT  32  /  AlkPhos  120  03-04  TPro  7.3  /  Alb  2.7[L]  /  TBili  0.3  /  DBili  x   /  AST  35  /  ALT  37  /  AlkPhos  131[H]  03-03  TPro  7.5  /  Alb  2.9[L]  /  TBili  0.3  /  DBili  x   /  AST  45[H]  /  ALT  47[H]  /  AlkPhos  152[H]  03-02    CAPILLARY BLOOD GLUCOSE          LIVER FUNCTIONS - ( 05 Mar 2025 00:15 )  Alb: 3.1 g/dL / Pro: 7.0 g/dL / ALK PHOS: 133 U/L / ALT: 32 U/L / AST: 34 U/L / GGT: x           PT/INR - ( 05 Mar 2025 00:15 )   PT: 11.5 sec;   INR: 1.01 ratio         PTT - ( 05 Mar 2025 13:31 )  PTT:63.4 sec  Urinalysis Basic - ( 05 Mar 2025 00:15 )    Color: x / Appearance: x / SG: x / pH: x  Gluc: 112 mg/dL / Ketone: x  / Bili: x / Urobili: x   Blood: x / Protein: x / Nitrite: x   Leuk Esterase: x / RBC: x / WBC x   Sq Epi: x / Non Sq Epi: x / Bacteria: x  rad< from: Xray Chest 1 View- PORTABLE-Urgent (Xray Chest 1 View- PORTABLE-Urgent .) (03.04.25 @ 22:52) >      INTERPRETATION:  CLINICAL INDICATION: NGT    EXAM: Frontal radiograph of the chest.    COMPARISON: Chest radiograph from 2/26/2025    FINDINGS:  Spinal hardware unchanged. Enteric tube tip within the region of the   stomach. Right-sided central venous catheter with tip in the right atrium.  Low lung volumes. Patchy opacities bilaterally, left greater than right.  Trace bilateral pleural effusions. No pneumothorax.  The heart size is not well evaluated in this position  No acute osseous abnormality.    IMPRESSION:  Enteric tube terminates within the stomach. Mild pulmonary edema.    --- End of Report ---          NERY ROGERS MD; Resident Radiologist  This document has been electronically signed.  SHARIFA PEREZ MD; Attending Radiologist  This document has been electronically signed. Mar  5 2025  9:57AM    < end of copied text >  < from: CT Chest No Cont (02.26.25 @ 22:32) >    BONES: Degenerative changes of the spine with partially visualized   thoracolumbar surgical hardware. Chronic healed bilateral rib and right   clavicular fractures.      IMPRESSION:  Since 2/17/2025, while the upper lobe groundglass opacities have   improved, there are new and worsening confluent areas of   consolidation/pneumonia in the mid to lower lungs.        --- End of Report ---          INESSA DAVID DO; Resident Radiologist  This document has been electronically signed.  SALONI CUELLAR M.D., Attending Radiologist  This document has been electronically signed. Feb 27 2025 12:10PM    < end of copied text >            RECENT CULTURES:        RESPIRATORY CULTURES:          Studies  Chest X-RAY  CT SCAN Chest   Venous Dopplers: LE:   CT Abdomen  Others

## 2025-03-05 NOTE — PROGRESS NOTE ADULT - SUBJECTIVE AND OBJECTIVE BOX
*******************************  Navya Michelle PGY-3  *******************************    INTERVAL HPI/OVERNIGHT EVENTS: Desaturation that self resolved overnight    SUBJECTIVE: Patient seen and examined at bedside.     OBJECTIVE:    VITAL SIGNS:  ICU Vital Signs Last 24 Hrs  T(C): 37.5 (05 Mar 2025 04:00), Max: 37.5 (05 Mar 2025 04:00)  T(F): 99.5 (05 Mar 2025 04:00), Max: 99.5 (05 Mar 2025 04:00)  HR: 110 (05 Mar 2025 06:00) (64 - 114)  BP: 139/74 (05 Mar 2025 06:00) (119/58 - 178/76)  BP(mean): 98 (05 Mar 2025 06:00) (83 - 114)  ABP: --  ABP(mean): --  RR: 33 (05 Mar 2025 06:00) (17 - 36)  SpO2: 100% (05 Mar 2025 06:00) (91% - 100%)    O2 Parameters below as of 05 Mar 2025 05:39  Patient On (Oxygen Delivery Method): nasal cannula          Mode: standby    03-04 @ 07:01  -  03-05 @ 07:00  --------------------------------------------------------  IN: 1891 mL / OUT: 1770 mL / NET: 121 mL      CAPILLARY BLOOD GLUCOSE      POCT Blood Glucose.: 162 mg/dL (04 Mar 2025 05:46)        PHYSICAL EXAM:  GENERAL: Intubated, opens eyes, tracks, follows commands. Able to nod/shake head to some questions  HEENT: NCAT  NECK: supple without JVD  CHEST/LUNG: mechanical breath sounds bilaterally  CARDIOVASCULAR: regular rate and rhythm, normal S1 and S2, no murmur/rub/gallop  ABDOMEN: positive bowel sounds, non-distended, no organomegaly   EXTREMITIES: no lower extremity edema, b/l upper extremities edematous, though improved in appearance  NEUROLOGY: Able to nod/shake head, follow commands with all extremities (though RUE weaker)      MEDICATIONS:  MEDICATIONS  (STANDING):  albuterol/ipratropium for Nebulization 3 milliLiter(s) Nebulizer every 6 hours  atorvastatin 20 milliGRAM(s) Oral at bedtime  cholecalciferol 1000 Unit(s) Oral daily  erythromycin   Ointment 1 Application(s) Both EYES four times a day  escitalopram 15 milliGRAM(s) Oral with breakfast  gabapentin Solution 150 milliGRAM(s) Oral every 12 hours  heparin  Infusion. 1700 Unit(s)/Hr (17 mL/Hr) IV Continuous <Continuous>  influenza  Vaccine (HIGH DOSE) 0.5 milliLiter(s) IntraMuscular once  lacosamide IVPB 200 milliGRAM(s) IV Intermittent every 12 hours  lactated ringers. 1000 milliLiter(s) (50 mL/Hr) IV Continuous <Continuous>  lamoTRIgine 50 milliGRAM(s) Oral two times a day  levETIRAcetam   Injectable 750 milliGRAM(s) IV Push every 12 hours  lurasidone 20 milliGRAM(s) Oral at bedtime  naloxegol 12.5 milliGRAM(s) Oral daily  pantoprazole  Injectable 40 milliGRAM(s) IV Push daily  polyethylene glycol 3350 17 Gram(s) Oral daily  senna Syrup 10 milliLiter(s) Oral at bedtime  sevelamer carbonate Powder 1600 milliGRAM(s) Oral every 8 hours  sodium chloride 3%  Inhalation 4 milliLiter(s) Inhalation every 6 hours    MEDICATIONS  (PRN):  artificial tears (preservative free) Ophthalmic Solution 1 Drop(s) Both EYES every 6 hours PRN Dry Eyes      ALLERGIES:  Allergies    seasonal allergies (Unknown)  penicillin (Hives)    Intolerances    latex (Rash)      LABS:                        8.0    8.84  )-----------( 142      ( 05 Mar 2025 00:15 )             25.8     03-05    140  |  100  |  124[H]  ----------------------------<  112[H]  4.2   |  24  |  3.31[H]    Ca    9.3      05 Mar 2025 00:15  Phos  6.3     03-05  Mg     3.0     03-05    TPro  7.0  /  Alb  3.1[L]  /  TBili  0.4  /  DBili  x   /  AST  34  /  ALT  32  /  AlkPhos  133[H]  03-05    PT/INR - ( 05 Mar 2025 00:15 )   PT: 11.5 sec;   INR: 1.01 ratio         PTT - ( 05 Mar 2025 06:37 )  PTT:72.0 sec  Urinalysis Basic - ( 05 Mar 2025 00:15 )    Color: x / Appearance: x / SG: x / pH: x  Gluc: 112 mg/dL / Ketone: x  / Bili: x / Urobili: x   Blood: x / Protein: x / Nitrite: x   Leuk Esterase: x / RBC: x / WBC x   Sq Epi: x / Non Sq Epi: x / Bacteria: x        RADIOLOGY & ADDITIONAL TESTS: Reviewed.     *******************************  Navya Michelle PGY-3  *******************************    INTERVAL HPI/OVERNIGHT EVENTS: Desaturation that self resolved overnight, likely iso VAZQUEZ    SUBJECTIVE: Patient seen and examined at bedside. Notes breathing is not back to normal, denies pain.     OBJECTIVE:    VITAL SIGNS:  ICU Vital Signs Last 24 Hrs  T(C): 37.5 (05 Mar 2025 04:00), Max: 37.5 (05 Mar 2025 04:00)  T(F): 99.5 (05 Mar 2025 04:00), Max: 99.5 (05 Mar 2025 04:00)  HR: 110 (05 Mar 2025 06:00) (64 - 114)  BP: 139/74 (05 Mar 2025 06:00) (119/58 - 178/76)  BP(mean): 98 (05 Mar 2025 06:00) (83 - 114)  ABP: --  ABP(mean): --  RR: 33 (05 Mar 2025 06:00) (17 - 36)  SpO2: 100% (05 Mar 2025 06:00) (91% - 100%)    O2 Parameters below as of 05 Mar 2025 05:39  Patient On (Oxygen Delivery Method): nasal cannula          Mode: standby    03-04 @ 07:01  -  03-05 @ 07:00  --------------------------------------------------------  IN: 1891 mL / OUT: 1770 mL / NET: 121 mL      CAPILLARY BLOOD GLUCOSE      POCT Blood Glucose.: 162 mg/dL (04 Mar 2025 05:46)        PHYSICAL EXAM:  GENERAL: Sitting in bed, having some difficulty breathing  HEENT: NCAT  NECK: supple without JVD  CHEST/LUNG: Secretions noted, rhonchi b/l bases  CARDIOVASCULAR: regular rate and rhythm, normal S1 and S2, no murmur/rub/gallop  ABDOMEN: positive bowel sounds, non-distended, no organomegaly   EXTREMITIES: no lower extremity edema, b/l upper extremities edematous, though improved in appearance  NEUROLOGY: Able to nod/shake head, follow commands with all extremities (though RUE weaker)      MEDICATIONS:  MEDICATIONS  (STANDING):  albuterol/ipratropium for Nebulization 3 milliLiter(s) Nebulizer every 6 hours  atorvastatin 20 milliGRAM(s) Oral at bedtime  cholecalciferol 1000 Unit(s) Oral daily  erythromycin   Ointment 1 Application(s) Both EYES four times a day  escitalopram 15 milliGRAM(s) Oral with breakfast  gabapentin Solution 150 milliGRAM(s) Oral every 12 hours  heparin  Infusion. 1700 Unit(s)/Hr (17 mL/Hr) IV Continuous <Continuous>  influenza  Vaccine (HIGH DOSE) 0.5 milliLiter(s) IntraMuscular once  lacosamide IVPB 200 milliGRAM(s) IV Intermittent every 12 hours  lactated ringers. 1000 milliLiter(s) (50 mL/Hr) IV Continuous <Continuous>  lamoTRIgine 50 milliGRAM(s) Oral two times a day  levETIRAcetam   Injectable 750 milliGRAM(s) IV Push every 12 hours  lurasidone 20 milliGRAM(s) Oral at bedtime  naloxegol 12.5 milliGRAM(s) Oral daily  pantoprazole  Injectable 40 milliGRAM(s) IV Push daily  polyethylene glycol 3350 17 Gram(s) Oral daily  senna Syrup 10 milliLiter(s) Oral at bedtime  sevelamer carbonate Powder 1600 milliGRAM(s) Oral every 8 hours  sodium chloride 3%  Inhalation 4 milliLiter(s) Inhalation every 6 hours    MEDICATIONS  (PRN):  artificial tears (preservative free) Ophthalmic Solution 1 Drop(s) Both EYES every 6 hours PRN Dry Eyes      ALLERGIES:  Allergies    seasonal allergies (Unknown)  penicillin (Hives)    Intolerances    latex (Rash)      LABS:                        8.0    8.84  )-----------( 142      ( 05 Mar 2025 00:15 )             25.8     03-05    140  |  100  |  124[H]  ----------------------------<  112[H]  4.2   |  24  |  3.31[H]    Ca    9.3      05 Mar 2025 00:15  Phos  6.3     03-05  Mg     3.0     03-05    TPro  7.0  /  Alb  3.1[L]  /  TBili  0.4  /  DBili  x   /  AST  34  /  ALT  32  /  AlkPhos  133[H]  03-05    PT/INR - ( 05 Mar 2025 00:15 )   PT: 11.5 sec;   INR: 1.01 ratio         PTT - ( 05 Mar 2025 06:37 )  PTT:72.0 sec  Urinalysis Basic - ( 05 Mar 2025 00:15 )    Color: x / Appearance: x / SG: x / pH: x  Gluc: 112 mg/dL / Ketone: x  / Bili: x / Urobili: x   Blood: x / Protein: x / Nitrite: x   Leuk Esterase: x / RBC: x / WBC x   Sq Epi: x / Non Sq Epi: x / Bacteria: x        RADIOLOGY & ADDITIONAL TESTS: Reviewed.

## 2025-03-05 NOTE — PROGRESS NOTE ADULT - ASSESSMENT
70 y/o M with PMH of HTN, HLD, VAZQUEZ on CPAP and home O2 (2LNC daytime), CKD, epilepsy, schizophrenia developmental delay, metastatic testicular CA. Recent admission at [facility] for acute respiratory failure in the setting of seizures, influenza infection, pulmonary edema, PNA requiring intubation in MICU. Was eventually discharged to rehab. Presents now with SOB, found to be COVID + at facility on 2/13. Febrile on admission to ED to 101.9F. Placed on Bipap for increased WOB. Pulmonary called to consult for acute respiratory failure.

## 2025-03-05 NOTE — PROGRESS NOTE ADULT - PROBLEM SELECTOR PLAN 7
Continue with home atorvastatin 20 mg bedtime for HLD, vitamin D supplementation, pantoprazole 40 mg daily, senna 2 tab bedtime, and Miralax 17 gram daily.     General  - DVT prophylaxis: heparin 5000 units q8h --> hep gtt per ICU  - Diet: regular   - Medication reconciliation: complete   - Code: Full   - Medications: Tylenol 650 mg q6h as needed for pain, Maalox 30 mL q4h as needed for acid reflux, melatonin 3 mg bedtime as needed for insomnia, Zofran 4 mg IV q8h as needed for nausea/vomiting      2/16/25 --> plan was discussed with patient's brother Fernando (over the phone, [phone]) in detail. He agrees with plan. All questions answered. He asked that I update Ester (sister, [phone]); she was called and updated as well, also agrees with the plan, also answered all questions.

## 2025-03-05 NOTE — PROGRESS NOTE ADULT - ASSESSMENT
Patient is a 69 year old male with PMH of HTN, HLD, VAZQUEZ (on CPAP at bedtime, NC 2 liters during the day), CKD3, epilepsy, schizophrenia, developmental delay, metastatic testicular cancer (s/p orchiectomy, actively on chemotherapy, last dose of etoposide/cisplatin on 6/2024) presenting with worsening shortness of breath. Patient was recently hospitalized at [facility] from January 27th 2025 to February 6th 2025 for seizure and influenza virus infection, which required intubation. He was sent to rehab (originally from home) from hospital and now returns due to sudden onset shortness of breath and worsening oxygenation. Of note, he tested positive for COVID-19 at facility on 2/13/25 and was not put on any COVID-19 treatment at the time, only guaifenesin and scopolamine patch. Patient was placed on non-rebreather and sent to the hospital on 2/16/25. Noted initial discussion with patient/family and they decided to hold off on remdesivir given LFTs and SHAGUFTA.     Acute on chronic hypoxic respiratory failure  COVID-19 pneumonia, superimposed bacterial pneumonia  Acute on chronic HFpEF  Klebsiella UTI, treated   SHAGUFTA on CKD  Course c/b severe sepsis, hypotension, SHAGUFTA, likely due to aspiration pneumonia   - CT chest with extensive b/l ggo c/w COVID pneumonia; tracheomalacia   - MRSA PCR screen negative, s/p vancomycin   - 2/18 worsening resp status on NC requiring AVAPS, started on remdesivir   - 2/22 completed remdesivir x5d course   - 2/23 s/p RRT for inc WOB, s/p intubation, suspected aspiration   - 2/24 intubated, on sedation, pressor support, SHAGUFTA, WBC wnl   - 2/25 afebrile, off pressor, WBC wnl, Cr stable  - 2/26 CT chest with improved upper lobe ggo, new/worsening confluent areas of consolidation in mid-lower lungs   - 2/26 completed dexamethasone x10d  - 2/28 completed zosyn   - 3/4 s/p extubation, now on NC     Recommendations:  Continue to monitor off antibiotics   Can trial acyclovir ointment to upper lip lesion  Nephrology following, monitor Cr   Monitor temps/WBC  Supportive care  Aspiration precautions  Continue rest of care per primary team       Greyson Mart M.D.  [facility]  Available on [employer] TEAMS - *PREFERRED*  [phone]  After 5pm on weekdays and all day on weekends - please call [phone]     Thank you for consulting us and involving us in the management of this patients case. In addition to reviewing history, imaging, documents, labs, microbiology, took into account antibiotic stewardship, local antibiogram and infection control strategies and potential transmission issues.

## 2025-03-05 NOTE — PROGRESS NOTE ADULT - SUBJECTIVE AND OBJECTIVE BOX
[facility] INPATIENT PROGRESS NOTE     Interval Hx:   03-05-25: Mr. Gr was seen at bedside today.    Meds:   MEDICATIONS  (STANDING):  albuterol/ipratropium for Nebulization 3 milliLiter(s) Nebulizer every 6 hours  atorvastatin 20 milliGRAM(s) Oral at bedtime  cholecalciferol 1000 Unit(s) Oral daily  erythromycin   Ointment 1 Application(s) Both EYES four times a day  escitalopram 15 milliGRAM(s) Oral with breakfast  gabapentin Solution 150 milliGRAM(s) Oral every 12 hours  heparin  Infusion. 1700 Unit(s)/Hr (17 mL/Hr) IV Continuous <Continuous>  influenza  Vaccine (HIGH DOSE) 0.5 milliLiter(s) IntraMuscular once  lacosamide IVPB 200 milliGRAM(s) IV Intermittent every 12 hours  lactated ringers. 1000 milliLiter(s) (50 mL/Hr) IV Continuous <Continuous>  lamoTRIgine 50 milliGRAM(s) Oral two times a day  levETIRAcetam   Injectable 750 milliGRAM(s) IV Push every 12 hours  lurasidone 20 milliGRAM(s) Oral at bedtime  naloxegol 12.5 milliGRAM(s) Oral daily  pantoprazole  Injectable 40 milliGRAM(s) IV Push daily  polyethylene glycol 3350 17 Gram(s) Oral daily  senna Syrup 10 milliLiter(s) Oral at bedtime  sevelamer carbonate Powder 1600 milliGRAM(s) Oral every 8 hours  sodium chloride 3%  Inhalation 4 milliLiter(s) Inhalation every 6 hours    MEDICATIONS  (PRN):  artificial tears (preservative free) Ophthalmic Solution 1 Drop(s) Both EYES every 6 hours PRN Dry Eyes    Vital Signs Last 24 Hrs  T(C): 37.5 (05 Mar 2025 04:00), Max: 37.5 (05 Mar 2025 04:00)  T(F): 99.5 (05 Mar 2025 04:00), Max: 99.5 (05 Mar 2025 04:00)  HR: 114 (05 Mar 2025 04:00) (62 - 114)  BP: 128/62 (05 Mar 2025 04:00) (119/58 - 178/76)  BP(mean): 88 (05 Mar 2025 04:00) (83 - 114)  RR: 28 (05 Mar 2025 04:00) (17 - 36)  SpO2: 98% (05 Mar 2025 04:00) (91% - 100%)    Parameters below as of 04 Mar 2025 23:36  Patient On (Oxygen Delivery Method): nasal cannula    Physical Exam:  Gen: Intubated  HEENT: EOMI, MMM  Chest: equal chest rise  Cardiac: regular   Abd: non distended    Labs:                        8.0    8.84  )-----------( 142      ( 05 Mar 2025 00:15 )             25.8     CBC Full  -  ( 05 Mar 2025 00:15 )  WBC Count : 8.84 K/uL  RBC Count : 2.55 M/uL  Hemoglobin : 8.0 g/dL  Hematocrit : 25.8 %  Platelet Count - Automated : 142 K/uL  Mean Cell Volume : 101.2 fl  Mean Cell Hemoglobin : 31.4 pg  Mean Cell Hemoglobin Concentration : 31.0 g/dL    03-05    140  |  100  |  124[H]  ----------------------------<  112[H]  4.2   |  24  |  3.31[H]    Ca    9.3      05 Mar 2025 00:15  Phos  6.3     03-05  Mg     3.0     03-05    TPro  7.0  /  Alb  3.1[L]  /  TBili  0.4  /  DBili  x   /  AST  34  /  ALT  32  /  AlkPhos  133[H]  03-05    PT/INR - ( 05 Mar 2025 00:15 )   PT: 11.5 sec;   INR: 1.01 ratio         PTT - ( 05 Mar 2025 00:15 )  PTT:122.0 sec  Bilirubin Total: 0.4 mg/dL (03-05-25 @ 00:15)   [facility] INPATIENT PROGRESS NOTE     Interval Hx:   03-05-25: Mr. Gr was seen at bedside today, extubated 03/04/2025, continues in MICU, awake and alert this morning and able to speak full sentences, discussed/reviewed findings, continues on heparin drip     Meds:   MEDICATIONS  (STANDING):  albuterol/ipratropium for Nebulization 3 milliLiter(s) Nebulizer every 6 hours  atorvastatin 20 milliGRAM(s) Oral at bedtime  cholecalciferol 1000 Unit(s) Oral daily  erythromycin   Ointment 1 Application(s) Both EYES four times a day  escitalopram 15 milliGRAM(s) Oral with breakfast  gabapentin Solution 150 milliGRAM(s) Oral every 12 hours  heparin  Infusion. 1700 Unit(s)/Hr (17 mL/Hr) IV Continuous <Continuous>  influenza  Vaccine (HIGH DOSE) 0.5 milliLiter(s) IntraMuscular once  lacosamide IVPB 200 milliGRAM(s) IV Intermittent every 12 hours  lactated ringers. 1000 milliLiter(s) (50 mL/Hr) IV Continuous <Continuous>  lamoTRIgine 50 milliGRAM(s) Oral two times a day  levETIRAcetam   Injectable 750 milliGRAM(s) IV Push every 12 hours  lurasidone 20 milliGRAM(s) Oral at bedtime  naloxegol 12.5 milliGRAM(s) Oral daily  pantoprazole  Injectable 40 milliGRAM(s) IV Push daily  polyethylene glycol 3350 17 Gram(s) Oral daily  senna Syrup 10 milliLiter(s) Oral at bedtime  sevelamer carbonate Powder 1600 milliGRAM(s) Oral every 8 hours  sodium chloride 3%  Inhalation 4 milliLiter(s) Inhalation every 6 hours    MEDICATIONS  (PRN):  artificial tears (preservative free) Ophthalmic Solution 1 Drop(s) Both EYES every 6 hours PRN Dry Eyes    Vital Signs Last 24 Hrs  T(C): 37.5 (05 Mar 2025 04:00), Max: 37.5 (05 Mar 2025 04:00)  T(F): 99.5 (05 Mar 2025 04:00), Max: 99.5 (05 Mar 2025 04:00)  HR: 114 (05 Mar 2025 04:00) (62 - 114)  BP: 128/62 (05 Mar 2025 04:00) (119/58 - 178/76)  BP(mean): 88 (05 Mar 2025 04:00) (83 - 114)  RR: 28 (05 Mar 2025 04:00) (17 - 36)  SpO2: 98% (05 Mar 2025 04:00) (91% - 100%)    Parameters below as of 04 Mar 2025 23:36  Patient On (Oxygen Delivery Method): nasal cannula    Physical Exam:  Gen: Intubated  HEENT: EOMI, MMM  Chest: equal chest rise  Cardiac: regular   Abd: non distended    Labs:                        8.0    8.84  )-----------( 142      ( 05 Mar 2025 00:15 )             25.8     CBC Full  -  ( 05 Mar 2025 00:15 )  WBC Count : 8.84 K/uL  RBC Count : 2.55 M/uL  Hemoglobin : 8.0 g/dL  Hematocrit : 25.8 %  Platelet Count - Automated : 142 K/uL  Mean Cell Volume : 101.2 fl  Mean Cell Hemoglobin : 31.4 pg  Mean Cell Hemoglobin Concentration : 31.0 g/dL    03-05    140  |  100  |  124[H]  ----------------------------<  112[H]  4.2   |  24  |  3.31[H]    Ca    9.3      05 Mar 2025 00:15  Phos  6.3     03-05  Mg     3.0     03-05    TPro  7.0  /  Alb  3.1[L]  /  TBili  0.4  /  DBili  x   /  AST  34  /  ALT  32  /  AlkPhos  133[H]  03-05    PT/INR - ( 05 Mar 2025 00:15 )   PT: 11.5 sec;   INR: 1.01 ratio         PTT - ( 05 Mar 2025 00:15 )  PTT:122.0 sec  Bilirubin Total: 0.4 mg/dL (03-05-25 @ 00:15)

## 2025-03-05 NOTE — PROGRESS NOTE ADULT - SUBJECTIVE AND OBJECTIVE BOX
[facility]  JACINTO Horton Y. Patel, S. Shah, G. Casimir  [phone]  ([phone] - weekdays after 5pm and weekends)    Name: OSCAR MILAN  Age/Gender: 69y Male  MRN: [id number]    Interval History:  Patient seen and examined this morning.   Resting on NC, denies pain or dyspnea.   Notes reviewed, s/p extubation yest.   No concerning overnight events  Afebrile   Allergies: seasonal allergies (Unknown)  penicillin (Hives)      Objective:  Vitals:   T(F): 99.5 (03-05-25 @ 04:00), Max: 99.5 (03-05-25 @ 04:00)  HR: 110 (03-05-25 @ 06:00) (64 - 114)  BP: 139/74 (03-05-25 @ 06:00) (119/58 - 178/76)  RR: 33 (03-05-25 @ 06:00) (17 - 36)  SpO2: 100% (03-05-25 @ 06:00) (91% - 100%)  Physical Examination:  General: no acute distress, NC   HEENT: NC/AT, anicteric, upper lip scab  Respiratory: decreased breath sounds bilaterally   Cardiovascular: S1 S2 present, normal rate   Gastrointestinal: soft, nondistended, nontender   Extremities: no LE edema, no cyanosis  Skin: no visible rash    Laboratory Studies:  CBC:                       8.0    8.84  )-----------( 142      ( 05 Mar 2025 00:15 )             25.8     WBC Trend:  8.84 03-05-25 @ 00:15  8.48 03-04-25 @ 00:40  7.06 03-03-25 @ 12:41  7.14 03-03-25 @ 00:57  8.02 03-03-25 @ 00:01  9.10 03-02-25 @ 12:01  9.10 03-02-25 @ 03:49  7.36 03-01-25 @ 00:50  8.31 02-28-25 @ 00:29  6.50 02-27-25 @ 00:24    CMP: 03-05    140  |  100  |  124[H]  ----------------------------<  112[H]  4.2   |  24  |  3.31[H]    Ca    9.3      05 Mar 2025 00:15  Phos  6.3     03-05  Mg     3.0     03-05    TPro  7.0  /  Alb  3.1[L]  /  TBili  0.4  /  DBili  x   /  AST  34  /  ALT  32  /  AlkPhos  133[H]  03-05    Creatinine: 3.31 mg/dL (03-05-25 @ 00:15)  Creatinine: 3.99 mg/dL (03-04-25 @ 00:26)  Creatinine: 4.34 mg/dL (03-03-25 @ 00:01)  Creatinine: 4.06 mg/dL (03-02-25 @ 12:01)  Creatinine: 4.11 mg/dL (03-02-25 @ 03:48)  Creatinine: 4.28 mg/dL (03-01-25 @ 12:36)  Creatinine: 4.24 mg/dL (03-01-25 @ 00:50)  Creatinine: 3.89 mg/dL (02-28-25 @ 12:33)  Creatinine: 4.01 mg/dL (02-28-25 @ 00:29)  Creatinine: 4.03 mg/dL (02-27-25 @ 09:21)  Creatinine: 4.08 mg/dL (02-27-25 @ 00:24)  Creatinine: 3.95 mg/dL (02-26-25 @ 16:13)  Creatinine: 4.03 mg/dL (02-26-25 @ 07:53)      LIVER FUNCTIONS - ( 05 Mar 2025 00:15 )  Alb: 3.1 g/dL / Pro: 7.0 g/dL / ALK PHOS: 133 U/L / ALT: 32 U/L / AST: 34 U/L / GGT: x             Microbiology: reviewed   Culture - Blood (collected 02-21-25 @ 19:36)  Source: .Blood Blood  Final Report (02-27-25 @ 02:01):    No growth at 5 days    Culture - Blood (collected 02-21-25 @ 19:35)  Source: .Blood Blood  Final Report (02-27-25 @ 02:01):    No growth at 5 days    Radiology: reviewed     Medications:  albuterol/ipratropium for Nebulization 3 milliLiter(s) Nebulizer every 6 hours  artificial tears (preservative free) Ophthalmic Solution 1 Drop(s) Both EYES every 6 hours PRN  atorvastatin 20 milliGRAM(s) Oral at bedtime  cholecalciferol 1000 Unit(s) Oral daily  erythromycin   Ointment 1 Application(s) Both EYES four times a day  escitalopram 15 milliGRAM(s) Oral with breakfast  gabapentin Solution 150 milliGRAM(s) Oral every 12 hours  heparin  Infusion. 1700 Unit(s)/Hr IV Continuous <Continuous>  influenza  Vaccine (HIGH DOSE) 0.5 milliLiter(s) IntraMuscular once  lacosamide IVPB 200 milliGRAM(s) IV Intermittent every 12 hours  lactated ringers. 1000 milliLiter(s) IV Continuous <Continuous>  lamoTRIgine 50 milliGRAM(s) Oral two times a day  levETIRAcetam   Injectable 750 milliGRAM(s) IV Push every 12 hours  lurasidone 20 milliGRAM(s) Oral at bedtime  naloxegol 12.5 milliGRAM(s) Oral daily  pantoprazole  Injectable 40 milliGRAM(s) IV Push daily  polyethylene glycol 3350 17 Gram(s) Oral daily  senna Syrup 10 milliLiter(s) Oral at bedtime  sevelamer carbonate Powder 1600 milliGRAM(s) Oral every 8 hours  sodium chloride 3%  Inhalation 4 milliLiter(s) Inhalation every 6 hours    Prior/Completed Antimicrobials:  piperacillin/tazobactam IVPB..  piperacillin/tazobactam IVPB..  piperacillin/tazobactam IVPB...  remdesivir  IVPB  remdesivir  IVPB  remdesivir  IVPB  vancomycin  IVPB.

## 2025-03-05 NOTE — PROGRESS NOTE ADULT - PROBLEM SELECTOR PLAN 1
-Recent admission for acute respiratory failure in the setting of grand mal seizures, influenza, PNA. S/p intubation in MICU, was extubated to [facility]  -Now COVID +  -CXR with low lung volumes, mild congestion. Possible underlying infiltrate  -CT chest with b/l GGO, new since 1/30/25. Likely COVID PNA +/- superimposed bacterial PNA.   -Started on HFNC 2/18  -S/p multiple RRTs for hypoxia, increased WOB. Intubated s/p RRT on 2/23 & transferred to MICU  -Continue bronchodilators  -ABX per ID/MICU  -Pressure support trials as tolerated  -Keep sats >90%.  2/26: remains intubated:  on 40% fio2:  per MICU :  cont antibiotics:  cxr with Improvement in upper lobes  2/27: seems to be doing same:  remains intubated and sedated : on antibiotics and pressors:  cont current rx  3/1: no change in status:  remains the same:  intubated and sedated  3/2: remains intubated:  but on cpap trials now:  off sedation:  cont current weaning trials  3/3: getting eeg today  : remains lethargic:  on precedex at this ti me: off antibiotics: id following : cont weaning trials:  3/4: today he looks  more alert and awake:  tries to open his eys:  on weaning trials:  3/5: extubated  ;  doing good:  alert and awake and responds to questions ":onitor resp status

## 2025-03-06 LAB
ALBUMIN SERPL ELPH-MCNC: 3.1 G/DL — LOW (ref 3.3–5)
ALP SERPL-CCNC: 130 U/L — HIGH (ref 40–120)
ALT FLD-CCNC: 30 U/L — SIGNIFICANT CHANGE UP (ref 10–45)
ANION GAP SERPL CALC-SCNC: 15 MMOL/L — SIGNIFICANT CHANGE UP (ref 5–17)
APTT BLD: 62.4 SEC — HIGH (ref 24.5–35.6)
APTT BLD: 62.7 SEC — HIGH (ref 24.5–35.6)
AST SERPL-CCNC: 29 U/L — SIGNIFICANT CHANGE UP (ref 10–40)
BASOPHILS # BLD AUTO: 0.02 K/UL — SIGNIFICANT CHANGE UP (ref 0–0.2)
BASOPHILS NFR BLD AUTO: 0.2 % — SIGNIFICANT CHANGE UP (ref 0–2)
BILIRUB SERPL-MCNC: 0.4 MG/DL — SIGNIFICANT CHANGE UP (ref 0.2–1.2)
BUN SERPL-MCNC: 102 MG/DL — HIGH (ref 7–23)
CALCIUM SERPL-MCNC: 9.8 MG/DL — SIGNIFICANT CHANGE UP (ref 8.4–10.5)
CHLORIDE SERPL-SCNC: 106 MMOL/L — SIGNIFICANT CHANGE UP (ref 96–108)
CO2 SERPL-SCNC: 23 MMOL/L — SIGNIFICANT CHANGE UP (ref 22–31)
CREAT SERPL-MCNC: 2.92 MG/DL — HIGH (ref 0.5–1.3)
EGFR: 23 ML/MIN/1.73M2 — LOW
EGFR: 23 ML/MIN/1.73M2 — LOW
EOSINOPHIL # BLD AUTO: 0.26 K/UL — SIGNIFICANT CHANGE UP (ref 0–0.5)
EOSINOPHIL NFR BLD AUTO: 2.7 % — SIGNIFICANT CHANGE UP (ref 0–6)
GAS PNL BLDA: SIGNIFICANT CHANGE UP
GAS PNL BLDV: SIGNIFICANT CHANGE UP
GLUCOSE SERPL-MCNC: 102 MG/DL — HIGH (ref 70–99)
GRAM STN FLD: SIGNIFICANT CHANGE UP
HCT VFR BLD CALC: 26.1 % — LOW (ref 39–50)
HGB BLD-MCNC: 8 G/DL — LOW (ref 13–17)
IMM GRANULOCYTES NFR BLD AUTO: 1.8 % — HIGH (ref 0–0.9)
INR BLD: 1.04 RATIO — SIGNIFICANT CHANGE UP (ref 0.85–1.16)
INR BLD: 1.15 RATIO — SIGNIFICANT CHANGE UP (ref 0.85–1.16)
LYMPHOCYTES # BLD AUTO: 0.67 K/UL — LOW (ref 1–3.3)
LYMPHOCYTES # BLD AUTO: 7 % — LOW (ref 13–44)
MAGNESIUM SERPL-MCNC: 2.9 MG/DL — HIGH (ref 1.6–2.6)
MCHC RBC-ENTMCNC: 30.7 G/DL — LOW (ref 32–36)
MCHC RBC-ENTMCNC: 31.3 PG — SIGNIFICANT CHANGE UP (ref 27–34)
MCV RBC AUTO: 102 FL — HIGH (ref 80–100)
MONOCYTES # BLD AUTO: 0.56 K/UL — SIGNIFICANT CHANGE UP (ref 0–0.9)
MONOCYTES NFR BLD AUTO: 5.9 % — SIGNIFICANT CHANGE UP (ref 2–14)
NEUTROPHILS # BLD AUTO: 7.84 K/UL — HIGH (ref 1.8–7.4)
NEUTROPHILS NFR BLD AUTO: 82.4 % — HIGH (ref 43–77)
NRBC BLD AUTO-RTO: 0 /100 WBCS — SIGNIFICANT CHANGE UP (ref 0–0)
PHOSPHATE SERPL-MCNC: 5.6 MG/DL — HIGH (ref 2.5–4.5)
PLATELET # BLD AUTO: 167 K/UL — SIGNIFICANT CHANGE UP (ref 150–400)
POTASSIUM SERPL-MCNC: 4.8 MMOL/L — SIGNIFICANT CHANGE UP (ref 3.5–5.3)
POTASSIUM SERPL-SCNC: 4.8 MMOL/L — SIGNIFICANT CHANGE UP (ref 3.5–5.3)
PROT SERPL-MCNC: 7.2 G/DL — SIGNIFICANT CHANGE UP (ref 6–8.3)
PROTHROM AB SERPL-ACNC: 11.9 SEC — SIGNIFICANT CHANGE UP (ref 9.9–13.4)
PROTHROM AB SERPL-ACNC: 13.1 SEC — SIGNIFICANT CHANGE UP (ref 9.9–13.4)
RBC # BLD: 2.56 M/UL — LOW (ref 4.2–5.8)
RBC # FLD: 16.1 % — HIGH (ref 10.3–14.5)
SODIUM SERPL-SCNC: 144 MMOL/L — SIGNIFICANT CHANGE UP (ref 135–145)
SPECIMEN SOURCE: SIGNIFICANT CHANGE UP
WBC # BLD: 9.52 K/UL — SIGNIFICANT CHANGE UP (ref 3.8–10.5)
WBC # FLD AUTO: 9.52 K/UL — SIGNIFICANT CHANGE UP (ref 3.8–10.5)

## 2025-03-06 PROCEDURE — 99233 SBSQ HOSP IP/OBS HIGH 50: CPT | Mod: GC

## 2025-03-06 RX ORDER — MODIFIED LANOLIN 100 %
1 CREAM (GRAM) TOPICAL DAILY
Refills: 0 | Status: DISCONTINUED | OUTPATIENT
Start: 2025-03-06 | End: 2025-03-28

## 2025-03-06 RX ORDER — LABETALOL HYDROCHLORIDE 200 MG/1
5 TABLET, FILM COATED ORAL ONCE
Refills: 0 | Status: COMPLETED | OUTPATIENT
Start: 2025-03-06 | End: 2025-03-06

## 2025-03-06 RX ORDER — AMLODIPINE BESYLATE 10 MG/1
5 TABLET ORAL DAILY
Refills: 0 | Status: DISCONTINUED | OUTPATIENT
Start: 2025-03-06 | End: 2025-03-09

## 2025-03-06 RX ORDER — SODIUM CHLORIDE 9 G/1000ML
1000 INJECTION, SOLUTION INTRAVENOUS
Refills: 0 | Status: COMPLETED | OUTPATIENT
Start: 2025-03-06 | End: 2025-03-07

## 2025-03-06 RX ORDER — NYSTATIN 100000 [USP'U]/G
1 CREAM TOPICAL
Refills: 0 | Status: DISCONTINUED | OUTPATIENT
Start: 2025-03-06 | End: 2025-03-13

## 2025-03-06 RX ADMIN — METOPROLOL SUCCINATE 12.5 MILLIGRAM(S): 50 TABLET, EXTENDED RELEASE ORAL at 05:03

## 2025-03-06 RX ADMIN — LABETALOL HYDROCHLORIDE 5 MILLIGRAM(S): 200 TABLET, FILM COATED ORAL at 19:51

## 2025-03-06 RX ADMIN — GABAPENTIN 150 MILLIGRAM(S): 400 CAPSULE ORAL at 05:03

## 2025-03-06 RX ADMIN — LACOSAMIDE 140 MILLIGRAM(S): 150 TABLET, FILM COATED ORAL at 17:30

## 2025-03-06 RX ADMIN — NYSTATIN 1 APPLICATION(S): 100000 CREAM TOPICAL at 17:30

## 2025-03-06 RX ADMIN — OFLOXACIN 1 DROP(S): 3 SOLUTION OPHTHALMIC at 05:04

## 2025-03-06 RX ADMIN — Medication 4 MILLILITER(S): at 11:06

## 2025-03-06 RX ADMIN — ERYTHROMYCIN 1 APPLICATION(S): 5 OINTMENT OPHTHALMIC at 05:03

## 2025-03-06 RX ADMIN — LAMOTRIGINE 50 MILLIGRAM(S): 150 TABLET ORAL at 17:31

## 2025-03-06 RX ADMIN — GABAPENTIN 150 MILLIGRAM(S): 400 CAPSULE ORAL at 17:28

## 2025-03-06 RX ADMIN — SEVELAMER HYDROCHLORIDE 1600 MILLIGRAM(S): 800 TABLET ORAL at 05:04

## 2025-03-06 RX ADMIN — Medication 1000 UNIT(S): at 12:21

## 2025-03-06 RX ADMIN — OFLOXACIN 1 DROP(S): 3 SOLUTION OPHTHALMIC at 01:17

## 2025-03-06 RX ADMIN — IPRATROPIUM BROMIDE AND ALBUTEROL SULFATE 3 MILLILITER(S): .5; 2.5 SOLUTION RESPIRATORY (INHALATION) at 17:17

## 2025-03-06 RX ADMIN — Medication 4 MILLILITER(S): at 23:14

## 2025-03-06 RX ADMIN — IPRATROPIUM BROMIDE AND ALBUTEROL SULFATE 3 MILLILITER(S): .5; 2.5 SOLUTION RESPIRATORY (INHALATION) at 05:20

## 2025-03-06 RX ADMIN — SEVELAMER HYDROCHLORIDE 1600 MILLIGRAM(S): 800 TABLET ORAL at 17:29

## 2025-03-06 RX ADMIN — OFLOXACIN 1 DROP(S): 3 SOLUTION OPHTHALMIC at 21:16

## 2025-03-06 RX ADMIN — IPRATROPIUM BROMIDE AND ALBUTEROL SULFATE 3 MILLILITER(S): .5; 2.5 SOLUTION RESPIRATORY (INHALATION) at 23:14

## 2025-03-06 RX ADMIN — ESCITALOPRAM OXALATE 15 MILLIGRAM(S): 20 TABLET ORAL at 08:29

## 2025-03-06 RX ADMIN — Medication 4 MILLILITER(S): at 17:17

## 2025-03-06 RX ADMIN — IPRATROPIUM BROMIDE AND ALBUTEROL SULFATE 3 MILLILITER(S): .5; 2.5 SOLUTION RESPIRATORY (INHALATION) at 11:05

## 2025-03-06 RX ADMIN — OFLOXACIN 1 DROP(S): 3 SOLUTION OPHTHALMIC at 10:56

## 2025-03-06 RX ADMIN — Medication 10 MILLILITER(S): at 21:14

## 2025-03-06 RX ADMIN — Medication 40 MILLIGRAM(S): at 12:21

## 2025-03-06 RX ADMIN — METOPROLOL SUCCINATE 12.5 MILLIGRAM(S): 50 TABLET, EXTENDED RELEASE ORAL at 17:30

## 2025-03-06 RX ADMIN — Medication 4 MILLILITER(S): at 05:20

## 2025-03-06 RX ADMIN — AMLODIPINE BESYLATE 5 MILLIGRAM(S): 10 TABLET ORAL at 19:51

## 2025-03-06 RX ADMIN — OFLOXACIN 1 DROP(S): 3 SOLUTION OPHTHALMIC at 17:31

## 2025-03-06 RX ADMIN — ATORVASTATIN CALCIUM 20 MILLIGRAM(S): 80 TABLET, FILM COATED ORAL at 21:14

## 2025-03-06 RX ADMIN — LAMOTRIGINE 50 MILLIGRAM(S): 150 TABLET ORAL at 05:03

## 2025-03-06 RX ADMIN — SEVELAMER HYDROCHLORIDE 1600 MILLIGRAM(S): 800 TABLET ORAL at 21:15

## 2025-03-06 RX ADMIN — LACOSAMIDE 140 MILLIGRAM(S): 150 TABLET, FILM COATED ORAL at 05:04

## 2025-03-06 RX ADMIN — OFLOXACIN 1 DROP(S): 3 SOLUTION OPHTHALMIC at 14:41

## 2025-03-06 RX ADMIN — ERYTHROMYCIN 1 APPLICATION(S): 5 OINTMENT OPHTHALMIC at 17:30

## 2025-03-06 RX ADMIN — LURASIDONE HYDROCHLORIDE 20 MILLIGRAM(S): 120 TABLET, FILM COATED ORAL at 21:14

## 2025-03-06 RX ADMIN — LABETALOL HYDROCHLORIDE 5 MILLIGRAM(S): 200 TABLET, FILM COATED ORAL at 18:10

## 2025-03-06 RX ADMIN — ERYTHROMYCIN 1 APPLICATION(S): 5 OINTMENT OPHTHALMIC at 12:21

## 2025-03-06 RX ADMIN — LEVETIRACETAM 750 MILLIGRAM(S): 10 INJECTION, SOLUTION INTRAVENOUS at 05:03

## 2025-03-06 RX ADMIN — LEVETIRACETAM 750 MILLIGRAM(S): 10 INJECTION, SOLUTION INTRAVENOUS at 17:30

## 2025-03-06 NOTE — PROGRESS NOTE ADULT - ASSESSMENT
Mr. Gr is a 69 year old male with PMHx of seizure disorder, sleep apnea, HTN, chronic idiopathic constipation, stage III CKD, severe obesity, secondary hyperparathyroidism, anemia, thrombocytopenia, hyperlipidemia, testicular cancer under treatment with Dr. Ovalles who is admitted for acute hypoxic respiratory failure secondary to COVID vs superimposed pneumonia with new onset thrombocytopenia. He was recently discharged 02/06/2025 after a tenous stay including intubation in the ICU for respiratory failure in setting of seizure. He had recovered and was discharged to rehab.      Former smoker, quit 14y prior, denied ETOH, drug use. Lives at home. Does not have children. Denies family history of blood disorders or malignancy.  Denies previous workplace related exposures.  Denies previous history of blood transfusions.  Denied history of thromboses.  Denied history of  requiring anticoagulation.     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  Margins were negative.  pT3Nx. CT C/A/P with few left para-aortic and left iliac chain lymph nodes largest measuring 8.1 cm left para-aortic node, bilateral subcentimeter noncalcified pulmonary nodules measuring up to 7 mm. AFP, LDH, hCG were all elevated. 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 Started on adjuvant therapy with Cisplatin+Etoposide, so far completed 4 cycles of treatment with cisplatin dose reduced 50% and Etoposide 50% due to thrombocytopenia.      02/19/2025: on HFNC, noted tachycardia and fever, Klebsiella in urine as well, thrombocytopenia stable/improving, no signs of active bleeding     02/20/2025: developed A.fib with RVR and was placed on heparin drip and pending cardiology eval    02/21/2025: awake, on AVAPS overnight, noted RRT for hypoxia as pt removed HFNC at some point in time, good response thereafter     02/22/2025:  continues on AVAPS this morning, noted RRT overnight for hypoxia, hypercapnia, ICU following, US LE negative for DVT, counts overall stable/improved     02/23/2025: progressive respiratory failure, noted ongoing RTT this morning with pending intubation and transfer to MICU     02/24/2025: intubated 02/23/2025, sedated, on pressor support, no signs of bleeding, counts noted, no signs of bleeding     02/25/2025: continues in MICU, intubated and sedated, no signs of bleeding    02/26/2025: continues in MICU, intubated, without signs of bleeding, continues on heparin drip     02/27/2025:  remains under the care of the ICU, intubated, no signs of bleeding and continues on heparin drip, counts stable, has not required PRBC support this admission    02/28/2025: continues under the care of MICU, continues intubated, no signs of bleeding, on heparin drip    03/01/2025: continues in MICU, intubated, direct josefina IgG positive, eluate negative, not likely to be clinically significant     03/02/2025:  continues in MICU, nursing staff at bedside, no overnight events noted. CTH without acute intracranial pathology     03/03/2025: continues in MICU, intubated, on heparin drip, 1u PRBC overnight, no signs of bleeding, platelet count stable     03/04/2025: awake, moving arms spontaneously on exam, continues without much interaction however. No signs of bleeding, continues heparin drip, continues intubated in MICU     03/05/2025: extubated 03/04/2025, continues in MICU, awake and alert this morning and able to speak full sentences, discussed/reviewed findings, continues on heparin drip       #Acute hypoxic respiratory failure  # COVID  - CT noted with bilateral GGO  - ID following  - Pulmonary following  - Antibiotics per primary team/MICU and ID   - Intubated 02/23/2025 AM, MICU   - extubated 03/04/2025    # Thrombocytopenia   - Did occur during most recent admission and improved to normal prior to discharge   - Without signs of bleeding  - Could be multifactorial, possibly due to infection   - Continue to trend  - Transfuse to maintain Plt > 10k unless actively bleeding then maintain > 50k     # Brain lesion  - pt with hx of seizures  - MRI brain now with 5.4 mm enhancing lesion in the LEFT middle cerebellar peduncle with associated edema suspicious for metastases  - MRI Lumbar negative for acute disease  - Small lesion without mass effect or edema, recommended to correlate per neurology if foci and locus are concordant with seizure activity  - Neurology recs noted, new lesion not likely to cause seizure  - It is rare, but nonetheless not impossible, for testicular cancer to be metastatic to the brain  - He will need another PET-CT, can be completed outpatient once stable if concern can proceed with biopsy at that time   - Previous endocrinology eval for thyroid nodule noted  - AFP/BHCH normal,  previously   - Repeat CTH without acute intracranial pathology       # 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 Ovalles, agree with current plan  - Follow up as outpatient with Franki Ovalles MD     # Acute kidney injury on CKD Stage IIIA  - Likely multifactorial  - Nephrology consult and recs noted  - Continue to trend     # Normocytic anemia  - Chronic, has hx of PRBC during chemo 07/2024  - Noted Anti E, Anti-K Anti-C antibodies previously  - direct josefina IgG positive, eluate negative, not likely to be clinically significant   - s/p 1u PRBC 03/03/2025   - Monitor for bleeding  - Recommend to transfuse to maintain Hb > 7    # Klebsiella UTI  -Antibiotics per ID and primary team       Recommendations  - Trend CBC daily   - Transfuse to maintain Hb > 7   - Transfuse to maintain Plt >10k unless active bleeding then >50k   - Monitor renal function  - Antibiotics per primary team/MICU and ID   - Cardiology follow up to address anticoagulation, currently on heparin drip   - f/u ongoing ICU recs         Thank you for allowing me to participate in the care of Mr. Gr please do not hesitate to call or text me if you have further questions or concerns.     Brayan Mart MD  [facility] [place]   Division of Hematology/Oncology  [address], [address]  [place] [address]  P: [phone]  F: [phone]    Attestation:    ----Pt evaluated including face-to-face interaction in addition to chart review, reviewing treatment plan, and managing the patient’s chronic diagnoses as listed in the assessment----        Mr. Gr is a 69 year old male with PMHx of seizure disorder, sleep apnea, HTN, chronic idiopathic constipation, stage III CKD, severe obesity, secondary hyperparathyroidism, anemia, thrombocytopenia, hyperlipidemia, testicular cancer under treatment with Dr. Ovalles who is admitted for acute hypoxic respiratory failure secondary to COVID vs superimposed pneumonia with new onset thrombocytopenia. He was recently discharged 02/06/2025 after a tenous stay including intubation in the ICU for respiratory failure in setting of seizure. He had recovered and was discharged to rehab.      Former smoker, quit 14y prior, denied ETOH, drug use. Lives at home. Does not have children. Denies family history of blood disorders or malignancy.  Denies previous workplace related exposures.  Denies previous history of blood transfusions.  Denied history of thromboses.  Denied history of  requiring anticoagulation.     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  Margins were negative.  pT3Nx. CT C/A/P with few left para-aortic and left iliac chain lymph nodes largest measuring 8.1 cm left para-aortic node, bilateral subcentimeter noncalcified pulmonary nodules measuring up to 7 mm. AFP, LDH, hCG were all elevated. 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 Started on adjuvant therapy with Cisplatin+Etoposide, so far completed 4 cycles of treatment with cisplatin dose reduced 50% and Etoposide 50% due to thrombocytopenia.      02/19/2025: on HFNC, noted tachycardia and fever, Klebsiella in urine as well, thrombocytopenia stable/improving, no signs of active bleeding     02/20/2025: developed A.fib with RVR and was placed on heparin drip and pending cardiology eval    02/21/2025: awake, on AVAPS overnight, noted RRT for hypoxia as pt removed HFNC at some point in time, good response thereafter     02/22/2025:  continues on AVAPS this morning, noted RRT overnight for hypoxia, hypercapnia, ICU following, US LE negative for DVT, counts overall stable/improved     02/23/2025: progressive respiratory failure, noted ongoing RTT this morning with pending intubation and transfer to MICU     02/24/2025: intubated 02/23/2025, sedated, on pressor support, no signs of bleeding, counts noted, no signs of bleeding     02/25/2025: continues in MICU, intubated and sedated, no signs of bleeding    02/26/2025: continues in MICU, intubated, without signs of bleeding, continues on heparin drip     02/27/2025:  remains under the care of the ICU, intubated, no signs of bleeding and continues on heparin drip, counts stable, has not required PRBC support this admission    02/28/2025: continues under the care of MICU, continues intubated, no signs of bleeding, on heparin drip    03/01/2025: continues in MICU, intubated, direct jsoefina IgG positive, eluate negative, not likely to be clinically significant     03/02/2025:  continues in MICU, nursing staff at bedside, no overnight events noted. CTH without acute intracranial pathology     03/03/2025: continues in MICU, intubated, on heparin drip, 1u PRBC overnight, no signs of bleeding, platelet count stable     03/04/2025: awake, moving arms spontaneously on exam, continues without much interaction however. No signs of bleeding, continues heparin drip, continues intubated in MICU     03/05/2025: extubated 03/04/2025, continues in MICU, awake and alert this morning and able to speak full sentences, discussed/reviewed findings, continues on heparin drip     03/06/2025: nursing staff at bedside, bipap overnight, without signs of bleeding, counts noted stable, renal function improving       #Acute hypoxic respiratory failure  # COVID  - CT noted with bilateral GGO  - ID following  - Pulmonary following  - Antibiotics per primary team/MICU and ID   - Intubated 02/23/2025 AM, MICU   - extubated 03/04/2025    # Thrombocytopenia   - Did occur during most recent admission and improved to normal prior to discharge   - Without signs of bleeding  - Could be multifactorial, possibly due to infection   - Continue to trend  - Transfuse to maintain Plt > 10k unless actively bleeding then maintain > 50k     # Brain lesion  - pt with hx of seizures  - MRI brain now with 5.4 mm enhancing lesion in the LEFT middle cerebellar peduncle with associated edema suspicious for metastases  - MRI Lumbar negative for acute disease  - Small lesion without mass effect or edema, recommended to correlate per neurology if foci and locus are concordant with seizure activity  - Neurology recs noted, new lesion not likely to cause seizure  - It is rare, but nonetheless not impossible, for testicular cancer to be metastatic to the brain  - He will need another PET-CT, can be completed outpatient once stable if concern can proceed with biopsy at that time   - Previous endocrinology eval for thyroid nodule noted  - AFP/BHCH normal,  previously   - Repeat CTH without acute intracranial pathology       # 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 Ovalles, agree with current plan  - Follow up as outpatient with Franki Ovalles MD     # Acute kidney injury on CKD Stage IIIA  - Likely multifactorial  - Nephrology consult and recs noted  - Continue to trend     # Normocytic anemia  - Chronic, has hx of PRBC during chemo 07/2024  - Noted Anti E, Anti-K Anti-C antibodies previously  - direct josefina IgG positive, eluate negative, not likely to be clinically significant   - s/p 1u PRBC 03/03/2025   - Monitor for bleeding  - Recommend to transfuse to maintain Hb > 7    # Klebsiella UTI  -Antibiotics per ID and primary team       Recommendations  - Trend CBC daily   - Transfuse to maintain Hb > 7   - Transfuse to maintain Plt >10k unless active bleeding then >50k   - Monitor renal function  - Cardiology follow up to address anticoagulation, currently on heparin drip   - f/u ongoing ICU recs         Thank you for allowing me to participate in the care of Mr. Gr please do not hesitate to call or text me if you have further questions or concerns.     Brayan Mart MD  [facility]   Division of Hematology/Oncology  [address], [address]  [place] [address]  P: [phone]  F: [phone]    Attestation:    ----Pt evaluated including face-to-face interaction in addition to chart review, reviewing treatment plan, and managing the patient’s chronic diagnoses as listed in the assessment----

## 2025-03-06 NOTE — PROGRESS NOTE ADULT - PROBLEM SELECTOR PLAN 3
-CT chest with b/l GGO, new since 1/30/25. Likely COVID PNA +/- superimposed bacterial PNA  -S/p ABX  -ID f/u.

## 2025-03-06 NOTE — PROGRESS NOTE ADULT - ASSESSMENT
Patient is a 69 year old male with PMH of HTN, HLD, VAZQUEZ (on CPAP at bedtime, NC 2 liters during the day), CKD3, epilepsy, schizophrenia, developmental delay, metastatic testicular cancer (s/p orchiectomy, actively on chemotherapy, last dose of etoposide/cisplatin on 6/2024) presenting with worsening shortness of breath. Patient was recently hospitalized at [facility] from January 27th 2025 to February 6th 2025 for seizure and influenza virus infection, which required intubation. He was sent to rehab (originally from home) from hospital and now returns due to sudden onset shortness of breath and worsening oxygenation. Of note, he tested positive for COVID-19 at facility on 2/13/25 and was not put on any COVID-19 treatment at the time, only guaifenesin and scopolamine patch. Patient was placed on non-rebreather and sent to the hospital on 2/16/25. Noted initial discussion with patient/family and they decided to hold off on remdesivir given LFTs and SHAGUFTA.     Acute on chronic hypoxic respiratory failure  COVID-19 pneumonia, superimposed bacterial pneumonia  Acute on chronic HFpEF  Klebsiella UTI, treated   SHAGUFTA on CKD  Course c/b severe sepsis, hypotension, SHAGUFTA, likely due to aspiration pneumonia   - CT chest with extensive b/l ggo c/w COVID pneumonia; tracheomalacia   - MRSA PCR screen negative, s/p vancomycin   - 2/18 worsening resp status on NC requiring AVAPS, started on remdesivir   - 2/22 completed remdesivir x5d course   - 2/23 s/p RRT for inc WOB, s/p intubation, suspected aspiration   - 2/24 intubated, on sedation, pressor support, SHAGUFTA, WBC wnl   - 2/25 afebrile, off pressor, WBC wnl, Cr stable  - 2/26 CT chest with improved upper lobe ggo, new/worsening confluent areas of consolidation in mid-lower lungs   - 2/26 completed dexamethasone x10d  - 2/28 completed zosyn   - 3/4 s/p extubation, now on NC   - 3/5 started on erythromycin for conjunctivitis     Recommendations:  Follow R eye culture, gram stain negative   Continue on erythromycin  Can trial acyclovir ointment to upper lip lesion  Continue off antibiotics other than above   Nephrology following, monitor Cr-improving   Monitor temps/WBC  Supportive care  Aspiration precautions  Continue rest of care per primary team       Greyson Mart M.D.  [facility]  Available on [employer] TEAMS - *PREFERRED*  [phone]  After 5pm on weekdays and all day on weekends - please call [phone]     Thank you for consulting us and involving us in the management of this patients case. In addition to reviewing history, imaging, documents, labs, microbiology, took into account antibiotic stewardship, local antibiogram and infection control strategies and potential transmission issues.

## 2025-03-06 NOTE — PROGRESS NOTE ADULT - ASSESSMENT
68 y/o M with PMH of HTN, HLD, VAZQUEZ on CPAP and home O2 (2LNC daytime), CKD, epilepsy, schizophrenia developmental delay, metastatic testicular CA. Recent admission at [facility] for acute respiratory failure in the setting of seizures, influenza infection, pulmonary edema, PNA requiring intubation in MICU. Was eventually discharged to rehab. Presents now with SOB, found to be COVID + at facility on 2/13. Febrile on admission to ED to 101.9F. Placed on Bipap for increased WOB. Pulmonary called to consult for acute respiratory failure.

## 2025-03-06 NOTE — PROGRESS NOTE ADULT - NSPROGADDITIONALINFOA_GEN_ALL_CORE
speech & swallow eval appreciated, s/p FEES: recommends thicken pureed diet.  events reviewed, RRT for hypoxia, intubated/sedated for increased work of breathing.   transferred to ICU. ICU eval appreciated  GOC discussed, full code per the sister.   Bumex gtt for overload, now off. weaned off pressors.   completed abx, completed antiviral.   ICU care appreciated.   vEEG per ICU team: no seizures.  s/p 1 unit prbc overnight, monitor hgb.   extubated 3/4/25.  on NC.  physical therapy. monitor cough. PRN antitussives.   daily xrays.  abx if suspicion of recurrent PNA.   ID following.   frequent suctioning for secretions.     - Dr. SILVA Htet ([facility])  - [phone]

## 2025-03-06 NOTE — PROGRESS NOTE ADULT - PROBLEM SELECTOR PLAN 1
-Recent admission for acute respiratory failure in the setting of grand mal seizures, influenza, PNA. S/p intubation in MICU, was extubated to AVAPS. Now COVID +  -CXR with low lung volumes, mild congestion. Possible underlying infiltrate  -CT chest with b/l GGO, new since 1/30/25. Likely COVID PNA +/- superimposed bacterial PNA.   -Started on HFNC 2/18  -S/p multiple RRTs for hypoxia, increased WOB in the setting of COVID, PNA, fluid overload. Intubated s/p RRT on 2/23 & transferred to MICU  -Extubated 3/4   -Continue bronchodilators  -Completed ABX   -Keep O>I as tolerated, s/p Bumex drip   -Keep sats >90% with O2 PRN.

## 2025-03-06 NOTE — PROGRESS NOTE ADULT - SUBJECTIVE AND OBJECTIVE BOX
SUBJECTIVE/ OVERNIGHT EVENTS:  comfortable  awake  still with some secretions  occasional cough.  under MICU care.  denied pain    --------------------------------------------------------------------------------------------  LABS:                        8.0    9.52  )-----------( 167      ( 06 Mar 2025 00:18 )             26.1     03-06    144  |  106  |  102[H]  ----------------------------<  102[H]  4.8   |  23  |  2.92[H]    Ca    9.8      06 Mar 2025 00:11  Phos  5.6     03-06  Mg     2.9     03-06    TPro  7.2  /  Alb  3.1[L]  /  TBili  0.4  /  DBili  x   /  AST  29  /  ALT  30  /  AlkPhos  130[H]  03-06    PT/INR - ( 06 Mar 2025 00:18 )   PT: 11.9 sec;   INR: 1.04 ratio         PTT - ( 06 Mar 2025 00:18 )  PTT:62.4 sec  CAPILLARY BLOOD GLUCOSE            Urinalysis Basic - ( 06 Mar 2025 00:11 )    Color: x / Appearance: x / SG: x / pH: x  Gluc: 102 mg/dL / Ketone: x  / Bili: x / Urobili: x   Blood: x / Protein: x / Nitrite: x   Leuk Esterase: x / RBC: x / WBC x   Sq Epi: x / Non Sq Epi: x / Bacteria: x        RADIOLOGY & ADDITIONAL TESTS:    Imaging Personally Reviewed:  [x] YES  [ ] NO    Consultant(s) Notes Reviewed:  [x] YES  [ ] NO    MEDICATIONS  (STANDING):  albuterol/ipratropium for Nebulization 3 milliLiter(s) Nebulizer every 6 hours  atorvastatin 20 milliGRAM(s) Oral at bedtime  cholecalciferol 1000 Unit(s) Oral daily  erythromycin   Ointment 1 Application(s) Both EYES four times a day  escitalopram 15 milliGRAM(s) Oral with breakfast  gabapentin Solution 150 milliGRAM(s) Oral every 12 hours  heparin  Infusion. 1700 Unit(s)/Hr (17 mL/Hr) IV Continuous <Continuous>  influenza  Vaccine (HIGH DOSE) 0.5 milliLiter(s) IntraMuscular once  lacosamide IVPB 200 milliGRAM(s) IV Intermittent every 12 hours  lactated ringers. 1000 milliLiter(s) (50 mL/Hr) IV Continuous <Continuous>  lamoTRIgine 50 milliGRAM(s) Oral two times a day  levETIRAcetam   Injectable 750 milliGRAM(s) IV Push every 12 hours  lurasidone 20 milliGRAM(s) Oral at bedtime  metoprolol tartrate 12.5 milliGRAM(s) Oral two times a day  ofloxacin 0.3% Solution 1 Drop(s) Right EYE every 4 hours  pantoprazole  Injectable 40 milliGRAM(s) IV Push daily  polyethylene glycol 3350 17 Gram(s) Oral daily  senna Syrup 10 milliLiter(s) Oral at bedtime  sevelamer carbonate Powder 1600 milliGRAM(s) Oral every 8 hours  sodium chloride 3%  Inhalation 4 milliLiter(s) Inhalation every 6 hours    MEDICATIONS  (PRN):  artificial tears (preservative free) Ophthalmic Solution 1 Drop(s) Both EYES every 6 hours PRN Dry Eyes      Care Discussed with Consultants/Other Providers [x] YES  [ ] NO    Vital Signs Last 24 Hrs  T(C): 37.6 (06 Mar 2025 04:00), Max: 37.7 (06 Mar 2025 00:00)  T(F): 99.7 (06 Mar 2025 04:00), Max: 99.9 (06 Mar 2025 00:00)  HR: 87 (06 Mar 2025 09:10) (87 - 112)  BP: 124/60 (06 Mar 2025 07:00) (122/64 - 174/82)  BP(mean): 87 (06 Mar 2025 07:00) (87 - 118)  RR: 31 (06 Mar 2025 07:00) (20 - 38)  SpO2: 97% (06 Mar 2025 09:10) (89% - 100%)    Parameters below as of 06 Mar 2025 05:36  Patient On (Oxygen Delivery Method): BiPAP/CPAP      I&O's Summary    05 Mar 2025 07:01  -  06 Mar 2025 07:00  --------------------------------------------------------  IN: 1995 mL / OUT: 1630 mL / NET: 365 mL          PHYSICAL EXAM:  GENERAL: comfortable, awake, on NC  HEAD:  Atraumatic, Normocephalic  EYES: EOMI, PERRLA, conjunctiva and sclera clear  NECK: Supple, No JVD  CHEST/LUNG: mild decrease breath sounds bilaterally; No wheeze   HEART: Regular rate and rhythm; No murmurs, rubs, or gallops  ABDOMEN: Soft, Nontender, Nondistended; Bowel sounds present  Neuro: awake. able to move extremities.   EXTREMITIES:  2+ Peripheral Pulses, No clubbing, cyanosis, or edema  SKIN: No rashes or lesions

## 2025-03-06 NOTE — PROGRESS NOTE ADULT - NS ATTEND AMEND GEN_ALL_CORE FT
seems to be doing pretty good;   no sob:  alert and awake and no resp distress:   no wheezing:   agree with above:   mantain o2 sao2 above 90% all the time

## 2025-03-06 NOTE — PROGRESS NOTE ADULT - ASSESSMENT
69 year old male with a past medical history of hypertension, hyperlipemia VAZQUEZ (on CPAP at bedtime, NC 2 liters during the day), CKD stage 3, epilepsy, schizophrenia, developmental delay, metastatic testicular cancer (s/p orchiectomy, actively on chemotherapy, last dose of etoposide/cisplatin on 6/2024) presenting with worsening shortness of breath. Patient was recently hospitalized at [facility] from January 27th 2025 to February 6th 2025 for seizure and influenza virus infection, which required intubation. He was sent to rehab (originally from home) from hospital. He returns due to sudden onset shortness of breath and worsening oxygenation. Of note, he tested positive for COVID-19       1- SHAGUFTA on CKDIII  2- covid-19  3- anemia  4- hypotension  bp is better   5- respiratory failure         SHAGUFTA suspected in setting of new infection. covid 19   cr and bun improving   trend cr and lytes   hold diuretics   hyperphosphatemia   renvela 1600 mg tid feeding tube   anemia, trend hb  cont O2   d/w ICU team when seen earlier

## 2025-03-06 NOTE — PROGRESS NOTE ADULT - ATTENDING COMMENTS
1. Acute  hypoxemic  respiratory failure in setting of Covid with new superimposed  pneumonia. Likely aspiration pneumonia.. Pt finished course of treatment  for covid and pneumonia. Pt tolerating extubation but still requires frequent suctioning  of secretions. Tolerating nasal cannula.    2. Renal. Acute renal failure , Pre renal . Over diuresis.  Good UO and decrease creatinine. with  LR at 75 cc/hr  for 24 hrs. Continue  fo additional 24 hrs.    3. Neuro. starting to wake up. Opens eyes to name. Decrease Precedex as tolerated. EEG negative. Stop EEG.'  Continue current AEDS regimen. Pt alert and following commands.    4.Cardiac. Chronic atrial fib. Continue  IV heparin . Continue metoprolol  12.5 bid.    5..DVT prophylaxis: IV heparin    6. GOC; Full code.    7. Psych. Schizophrenia: Continue  current psych  regimen

## 2025-03-06 NOTE — PROGRESS NOTE ADULT - ASSESSMENT
69 year old male with a past medical history of hypertension, hyperlipemia VAZQUEZ (on CPAP at bedtime, NC 2 liters during the day), CKD stage 3, epilepsy, schizophrenia, developmental delay, metastatic testicular cancer (s/p orchiectomy, actively on chemotherapy, last dose of etoposide/cisplatin on 6/2024), presenting with acute on chronic hypoxemic respiratory failure, secondary to (a) COVID-19 infection, (b) superimposed pneumonia after recent influenza infection, and/or (c) fluid overload.     MICU consulted and accepted after RRT due to increased work of breathing    NEURO:  #Mental status:  - Off precedex  - Back to baseline mental status    #Epilepsy:  - Continue with home meds which include Lacosamide, Lurasidone, Lamotrigine - increased to 50 BID  - EEG negative    #Brain Mets:  - MRI brain now with 5.4 mm enhancing lesion in the LEFT middle cerebellar peduncle with associated edema suspicious for metastases    # Schizophrenia  - Lurasidone halved to reduce oversedation    CV:  #Echo:  Recent echo last admission showing Left ventricular cavity is normal in size. Left ventricular systolic function is normal with an ejection fraction of 62 %. There are no regional wall motion abnormalities seen. Normal right ventricular cavity size and normal right ventricular systolic function.   - Repeat echo continuing to show EF 70%    #Afib w/RVR  - New onset Afib RVR (on tele, confirmed with EKG 2/19)   - Plan: Started metoprolol 5mg IVP q6 hours & Hep gtt -> metoprolol restarted  - If rate remains uncontrolled, can start Cardizem drip at 5mg/h  - c/w Heparin drip    #HTN:  - On Hydralazine 25mg TID, Amlodipine 5mg, held iso current hypotension    PULM:  #Vent   - Extubated 3/4    #AHRF  - Patient admitted to the hospital for AHRF, found to be COVID (+)  - Likely 2/2 PNA, does not appear to CHF decompensation  - Continue with airway clearance regimen - duoneb, hypertonic saline, chest PT  - CT chest with b/l GGOs, possible early COVID fibrosis, area of consolidation ?infectious vs. atelectasis    RENAL:  #SHAGUFTA:   - Hx of CKD stage 3, Cr 2.38 at baseline, seems to be plateauing at 4.24  - In setting of COVID (+)  - Pre-renal, FeUrea 29%  - Monitor Cr  - Avoid nephrotoxic meds  - [place] in place  - Fluid challenge - albumin and blood given with improvement in renal function, will continue on 50 cc LR    GI:  No acute issues     #Diet: Tube feeds via OG tube  #Bowel Regimen  - On Senna and Miralax    ENDO:  No acute issues  Thyroid nodule noted in prior notes    HEMATOLOGIC:  #Thrombocytopenic  - Unclear origin  - Concern for possible mets to bone marrow? vs due to infection  - Continue to monitor  - Transfuse to maintain Plt>10K unless actively bleeding then maintain >50K  - Heme-Onc recs appreciated    #Anemia  - CKD vs. cancer vs. sepsis  - 1 unit PRBCs given  - CTM CBC  - No sign of bleeding    #DVT prophylaxis   - SCD    ID:  #COVID-19  - Concern for possible superimposed PNA   - S/p zosyn 2/16- 2/28  - S/p Remdesivir  - Blood cx negative    SKIN:  #Lines:  2x PIV    Ethics:  - Full Code  - Contact: Sister Ester [phone]

## 2025-03-06 NOTE — ADVANCED PRACTICE NURSE CONSULT - RECOMMEDATIONS
Impression/Recommendation:    Fungal MASD to b/l groin/scrotum and b/l buttocks  - Apply Darrius Antifungal daily/PRN soiling.    Prevention:  ·	Moisturize skin with Sween24 daily after bathing.  ·	Continue turning and positioning q2h and utilize offloading devices as per protocol.  ·	Avoid use of diapers and utilize external catheters if patient unable to toilet OOB.  ·	Continue with only one breathable underpad and daily/PRN pericare for soiling.  ·	Waffle cushion if OOB to chair.  ·	Nutrition Consult for optimization in pt w/ increased nutritional needs.  ·	Encourage high quality protein, [place]/Prosource, MVI & Vit C to promote wound healing. Impression:    Fungal MASD to b/l groin/scrotum and b/l buttocks  - Apply Darrius Antifungal daily/PRN soiling.    Prevention:  ·	Moisturize skin with Sween24 daily after bathing.  ·	Continue turning and positioning q2h and utilize offloading devices as per protocol.  ·	Avoid use of diapers and utilize external catheters if patient unable to toilet OOB.  ·	Continue with only one breathable underpad and daily/PRN pericare for soiling.  ·	Waffle cushion if OOB to chair.  ·	Nutrition Consult for optimization in pt w/ increased nutritional needs. Impression:    Fungal MASD to b/l groin/scrotum and b/l buttocks  - Apply Darrius Antifungal daily/PRN soiling.    Prevention:  ·	Moisturize skin with Sween24 daily after bathing.  ·	Continue turning and positioning q2h and utilize offloading devices as per protocol.  ·	Avoid use of diapers and utilize external catheters if patient unable to toilet OOB.  ·	Continue with only one breathable underpad and daily/PRN pericare for soiling.  ·	Waffle cushion if OOB to chair.  ·	Nutrition Consult for optimization in pt w/ increased nutritional needs.    For questions/comments regarding the recommendations in this consult, please contact Nohemy Mayen via [employer] Teams. Wound care will not actively follow. For new concerns, please enter new consult. Thank you!

## 2025-03-06 NOTE — PROGRESS NOTE ADULT - SUBJECTIVE AND OBJECTIVE BOX
[facility]   [phone]  RENAL FOLLOW UP NOTE  --------------------------------------------------------------------------------  Chief Complaint:    24 hour events/subjective:    seen earlier  on O2     PAST HISTORY  --------------------------------------------------------------------------------  No significant changes to PMH, PSH, FHx, SHx, unless otherwise noted    ALLERGIES & MEDICATIONS  --------------------------------------------------------------------------------  Allergies    seasonal allergies (Unknown)  penicillin (Hives)    Intolerances    latex (Rash)    Standing Inpatient Medications  albuterol/ipratropium for Nebulization 3 milliLiter(s) Nebulizer every 6 hours  amLODIPine   Tablet 5 milliGRAM(s) Oral daily  atorvastatin 20 milliGRAM(s) Oral at bedtime  cholecalciferol 1000 Unit(s) Oral daily  erythromycin   Ointment 1 Application(s) Both EYES four times a day  escitalopram 15 milliGRAM(s) Oral with breakfast  gabapentin Solution 150 milliGRAM(s) Oral every 12 hours  heparin  Infusion. 1700 Unit(s)/Hr IV Continuous <Continuous>  influenza  Vaccine (HIGH DOSE) 0.5 milliLiter(s) IntraMuscular once  lacosamide IVPB 200 milliGRAM(s) IV Intermittent every 12 hours  lactated ringers. 1000 milliLiter(s) IV Continuous <Continuous>  lamoTRIgine 50 milliGRAM(s) Oral two times a day  lanolin Ointment 1 Application(s) Topical daily  levETIRAcetam   Injectable 750 milliGRAM(s) IV Push every 12 hours  lurasidone 20 milliGRAM(s) Oral at bedtime  metoprolol tartrate 12.5 milliGRAM(s) Oral two times a day  nystatin Powder 1 Application(s) Topical two times a day  ofloxacin 0.3% Solution 1 Drop(s) Right EYE every 4 hours  pantoprazole  Injectable 40 milliGRAM(s) IV Push daily  polyethylene glycol 3350 17 Gram(s) Oral daily  senna Syrup 10 milliLiter(s) Oral at bedtime  sevelamer carbonate Powder 1600 milliGRAM(s) Oral every 8 hours  sodium chloride 3%  Inhalation 4 milliLiter(s) Inhalation every 6 hours    PRN Inpatient Medications  artificial tears (preservative free) Ophthalmic Solution 1 Drop(s) Both EYES every 6 hours PRN      REVIEW OF SYSTEMS  --------------------------------------------------------------------------------    Gen: denies  HA or dizziness  CVS: denies chest pain/palpitations  Resp: denies SOB/Cough  GI: Denies N/V/Abd pain  : Denies dysuria    VITALS/PHYSICAL EXAM  --------------------------------------------------------------------------------  T(C): 37 (03-06-25 @ 16:00), Max: 37.7 (03-06-25 @ 00:00)  HR: 105 (03-06-25 @ 18:29) (87 - 113)  BP: 154/87 (03-06-25 @ 18:29) (106/54 - 191/90)  RR: 28 (03-06-25 @ 18:29) (20 - 54)  SpO2: 100% (03-06-25 @ 18:29) (90% - 100%)  Wt(kg): --        03-05-25 @ 07:01  -  03-06-25 @ 07:00  --------------------------------------------------------  IN: 1995 mL / OUT: 1630 mL / NET: 365 mL    03-06-25 @ 07:01  -  03-06-25 @ 20:40  --------------------------------------------------------  IN: 1075 mL / OUT: 200 mL / NET: 875 mL      Physical Exam:  	  Gen: ill appearing male, awake responsive   	Pulm: decrease bs, +coarse  remains tachypneic   	CV: No JVD. RRR, S1S2; no rub  	Abd: +BS, soft, nondistended  	UE: Warm, no cyanosis  no clubbing,  no edema;  	LE: Warm, no cyanosis  no clubbing, no edema      LABS/STUDIES  --------------------------------------------------------------------------------              8.0    9.52  >-----------<  167      [03-06-25 @ 00:18]              26.1     144  |  106  |  102  ----------------------------<  102      [03-06-25 @ 00:11]  4.8   |  23  |  2.92        Ca     9.8     [03-06-25 @ 00:11]      Mg     2.9     [03-06-25 @ 00:11]      Phos  5.6     [03-06-25 @ 00:11]    TPro  7.2  /  Alb  3.1  /  TBili  0.4  /  DBili  x   /  AST  29  /  ALT  30  /  AlkPhos  130  [03-06-25 @ 00:11]    PT/INR: PT 11.9 , INR 1.04       [03-06-25 @ 00:18]  PTT: 62.4       [03-06-25 @ 00:18]      Creatinine Trend:  SCr 2.92 [03-06 @ 00:11]  SCr 3.31 [03-05 @ 00:15]  SCr 3.99 [03-04 @ 00:26]  SCr 4.34 [03-03 @ 00:01]  SCr 4.06 [03-02 @ 12:01]        Ferritin [id number]      [02-23-25 @ 06:15]  TSH 0.87      [01-25-25 @ 11:31]

## 2025-03-06 NOTE — PROGRESS NOTE ADULT - SUBJECTIVE AND OBJECTIVE BOX
INTERVAL HPI/OVERNIGHT EVENTS:    SUBJECTIVE: Patient seen and examined at bedside. ROS could not be obtained as patient intubated, sedated.      VITAL SIGNS:  ICU Vital Signs Last 24 Hrs  T(C): 37.6 (06 Mar 2025 04:00), Max: 37.7 (06 Mar 2025 00:00)  T(F): 99.7 (06 Mar 2025 04:00), Max: 99.9 (06 Mar 2025 00:00)  HR: 89 (06 Mar 2025 07:00) (89 - 112)  BP: 124/60 (06 Mar 2025 07:00) (122/64 - 174/82)  BP(mean): 87 (06 Mar 2025 07:00) (87 - 118)  ABP: --  ABP(mean): --  RR: 31 (06 Mar 2025 07:00) (20 - 38)  SpO2: 100% (06 Mar 2025 07:00) (89% - 100%)    O2 Parameters below as of 06 Mar 2025 05:36  Patient On (Oxygen Delivery Method): BiPAP/CPAP            Plateau pressure:   P/F ratio:     03-05 @ 07:01  -  03-06 @ 07:00  --------------------------------------------------------  IN: 1995 mL / OUT: 1630 mL / NET: 365 mL      CAPILLARY BLOOD GLUCOSE        I&O's Summary    05 Mar 2025 07:01  -  06 Mar 2025 07:00  --------------------------------------------------------  IN: 1995 mL / OUT: 1630 mL / NET: 365 mL          PHYSICAL EXAM:    General: NAD intubated  HEENT: PERRLA, EOMI, non-icteric  Neck:  symmetric,  JVD absent  Respiratory: Clear to ascultation bilaterally, no crackles/rales, no Resp distress; no accessory muscle use  Cardiovascular:  RRR, no murmurs/rubs/gallops  Abdomen: Soft, NT, ND  Extremities: No edema noted  Skin: No rashes or lesions noted  Neurological: RASS _  Psychiatry: AOx    MEDICATIONS:  MEDICATIONS  (STANDING):  albuterol/ipratropium for Nebulization 3 milliLiter(s) Nebulizer every 6 hours  atorvastatin 20 milliGRAM(s) Oral at bedtime  cholecalciferol 1000 Unit(s) Oral daily  erythromycin   Ointment 1 Application(s) Both EYES four times a day  escitalopram 15 milliGRAM(s) Oral with breakfast  gabapentin Solution 150 milliGRAM(s) Oral every 12 hours  heparin  Infusion. 1700 Unit(s)/Hr (17 mL/Hr) IV Continuous <Continuous>  influenza  Vaccine (HIGH DOSE) 0.5 milliLiter(s) IntraMuscular once  lacosamide IVPB 200 milliGRAM(s) IV Intermittent every 12 hours  lactated ringers. 1000 milliLiter(s) (50 mL/Hr) IV Continuous <Continuous>  lamoTRIgine 50 milliGRAM(s) Oral two times a day  levETIRAcetam   Injectable 750 milliGRAM(s) IV Push every 12 hours  lurasidone 20 milliGRAM(s) Oral at bedtime  metoprolol tartrate 12.5 milliGRAM(s) Oral two times a day  ofloxacin 0.3% Solution 1 Drop(s) Right EYE every 4 hours  pantoprazole  Injectable 40 milliGRAM(s) IV Push daily  polyethylene glycol 3350 17 Gram(s) Oral daily  senna Syrup 10 milliLiter(s) Oral at bedtime  sevelamer carbonate Powder 1600 milliGRAM(s) Oral every 8 hours  sodium chloride 3%  Inhalation 4 milliLiter(s) Inhalation every 6 hours    MEDICATIONS  (PRN):  artificial tears (preservative free) Ophthalmic Solution 1 Drop(s) Both EYES every 6 hours PRN Dry Eyes      ALLERGIES:  Allergies    seasonal allergies (Unknown)  penicillin (Hives)    Intolerances    latex (Rash)      LABS:                        8.0    9.52  )-----------( 167      ( 06 Mar 2025 00:18 )             26.1     03-06    144  |  106  |  102[H]  ----------------------------<  102[H]  4.8   |  23  |  2.92[H]    Ca    9.8      06 Mar 2025 00:11  Phos  5.6     03-06  Mg     2.9     03-06    TPro  7.2  /  Alb  3.1[L]  /  TBili  0.4  /  DBili  x   /  AST  29  /  ALT  30  /  AlkPhos  130[H]  03-06    PT/INR - ( 06 Mar 2025 00:18 )   PT: 11.9 sec;   INR: 1.04 ratio         PTT - ( 06 Mar 2025 00:18 )  PTT:62.4 sec  ABG - ( 06 Mar 2025 00:02 )  pH, Arterial: 7.40  pH, Blood: x     /  pCO2: 44    /  pO2: 106   / HCO3: 27    / Base Excess: 2.2   /  SaO2: 98.4              Urinalysis Basic - ( 06 Mar 2025 00:11 )    Color: x / Appearance: x / SG: x / pH: x  Gluc: 102 mg/dL / Ketone: x  / Bili: x / Urobili: x   Blood: x / Protein: x / Nitrite: x   Leuk Esterase: x / RBC: x / WBC x   Sq Epi: x / Non Sq Epi: x / Bacteria: x          RADIOLOGY & ADDITIONAL TESTS: Reviewed. INCOMPLETE       INTERVAL HPI/OVERNIGHT EVENTS:    SUBJECTIVE: Patient seen and examined at bedside.      VITAL SIGNS:  ICU Vital Signs Last 24 Hrs  T(C): 37.6 (06 Mar 2025 04:00), Max: 37.7 (06 Mar 2025 00:00)  T(F): 99.7 (06 Mar 2025 04:00), Max: 99.9 (06 Mar 2025 00:00)  HR: 89 (06 Mar 2025 07:00) (89 - 112)  BP: 124/60 (06 Mar 2025 07:00) (122/64 - 174/82)  BP(mean): 87 (06 Mar 2025 07:00) (87 - 118)  ABP: --  ABP(mean): --  RR: 31 (06 Mar 2025 07:00) (20 - 38)  SpO2: 100% (06 Mar 2025 07:00) (89% - 100%)    O2 Parameters below as of 06 Mar 2025 05:36  Patient On (Oxygen Delivery Method): BiPAP/CPAP            Plateau pressure:   P/F ratio:     03-05 @ 07:01  -  03-06 @ 07:00  --------------------------------------------------------  IN: 1995 mL / OUT: 1630 mL / NET: 365 mL      CAPILLARY BLOOD GLUCOSE        I&O's Summary    05 Mar 2025 07:01  -  06 Mar 2025 07:00  --------------------------------------------------------  IN: 1995 mL / OUT: 1630 mL / NET: 365 mL          PHYSICAL EXAM:    General: NAD intubated  HEENT: PERRLA, EOMI, non-icteric  Neck:  symmetric,  JVD absent  Respiratory: Clear to ascultation bilaterally, no crackles/rales, no Resp distress; no accessory muscle use  Cardiovascular:  RRR, no murmurs/rubs/gallops  Abdomen: Soft, NT, ND  Extremities: No edema noted  Skin: No rashes or lesions noted  Neurological: RASS _  Psychiatry: AOx    MEDICATIONS:  MEDICATIONS  (STANDING):  albuterol/ipratropium for Nebulization 3 milliLiter(s) Nebulizer every 6 hours  atorvastatin 20 milliGRAM(s) Oral at bedtime  cholecalciferol 1000 Unit(s) Oral daily  erythromycin   Ointment 1 Application(s) Both EYES four times a day  escitalopram 15 milliGRAM(s) Oral with breakfast  gabapentin Solution 150 milliGRAM(s) Oral every 12 hours  heparin  Infusion. 1700 Unit(s)/Hr (17 mL/Hr) IV Continuous <Continuous>  influenza  Vaccine (HIGH DOSE) 0.5 milliLiter(s) IntraMuscular once  lacosamide IVPB 200 milliGRAM(s) IV Intermittent every 12 hours  lactated ringers. 1000 milliLiter(s) (50 mL/Hr) IV Continuous <Continuous>  lamoTRIgine 50 milliGRAM(s) Oral two times a day  levETIRAcetam   Injectable 750 milliGRAM(s) IV Push every 12 hours  lurasidone 20 milliGRAM(s) Oral at bedtime  metoprolol tartrate 12.5 milliGRAM(s) Oral two times a day  ofloxacin 0.3% Solution 1 Drop(s) Right EYE every 4 hours  pantoprazole  Injectable 40 milliGRAM(s) IV Push daily  polyethylene glycol 3350 17 Gram(s) Oral daily  senna Syrup 10 milliLiter(s) Oral at bedtime  sevelamer carbonate Powder 1600 milliGRAM(s) Oral every 8 hours  sodium chloride 3%  Inhalation 4 milliLiter(s) Inhalation every 6 hours    MEDICATIONS  (PRN):  artificial tears (preservative free) Ophthalmic Solution 1 Drop(s) Both EYES every 6 hours PRN Dry Eyes      ALLERGIES:  Allergies    seasonal allergies (Unknown)  penicillin (Hives)    Intolerances    latex (Rash)      LABS:                        8.0    9.52  )-----------( 167      ( 06 Mar 2025 00:18 )             26.1     03-06    144  |  106  |  102[H]  ----------------------------<  102[H]  4.8   |  23  |  2.92[H]    Ca    9.8      06 Mar 2025 00:11  Phos  5.6     03-06  Mg     2.9     03-06    TPro  7.2  /  Alb  3.1[L]  /  TBili  0.4  /  DBili  x   /  AST  29  /  ALT  30  /  AlkPhos  130[H]  03-06    PT/INR - ( 06 Mar 2025 00:18 )   PT: 11.9 sec;   INR: 1.04 ratio         PTT - ( 06 Mar 2025 00:18 )  PTT:62.4 sec  ABG - ( 06 Mar 2025 00:02 )  pH, Arterial: 7.40  pH, Blood: x     /  pCO2: 44    /  pO2: 106   / HCO3: 27    / Base Excess: 2.2   /  SaO2: 98.4              Urinalysis Basic - ( 06 Mar 2025 00:11 )    Color: x / Appearance: x / SG: x / pH: x  Gluc: 102 mg/dL / Ketone: x  / Bili: x / Urobili: x   Blood: x / Protein: x / Nitrite: x   Leuk Esterase: x / RBC: x / WBC x   Sq Epi: x / Non Sq Epi: x / Bacteria: x          RADIOLOGY & ADDITIONAL TESTS: Reviewed. INTERVAL HPI/OVERNIGHT EVENTS: Sister worried about patient leaving ICU. No other acute events    SUBJECTIVE: Patient seen and examined at bedside. Unable to obtain ROS 2/2 mental status       VITAL SIGNS:  ICU Vital Signs Last 24 Hrs  T(C): 37.6 (06 Mar 2025 04:00), Max: 37.7 (06 Mar 2025 00:00)  T(F): 99.7 (06 Mar 2025 04:00), Max: 99.9 (06 Mar 2025 00:00)  HR: 89 (06 Mar 2025 07:00) (89 - 112)  BP: 124/60 (06 Mar 2025 07:00) (122/64 - 174/82)  BP(mean): 87 (06 Mar 2025 07:00) (87 - 118)  ABP: --  ABP(mean): --  RR: 31 (06 Mar 2025 07:00) (20 - 38)  SpO2: 100% (06 Mar 2025 07:00) (89% - 100%)    O2 Parameters below as of 06 Mar 2025 05:36  Patient On (Oxygen Delivery Method): BiPAP/CPAP            Plateau pressure:   P/F ratio:     03-05 @ 07:01  -  03-06 @ 07:00  --------------------------------------------------------  IN: 1995 mL / OUT: 1630 mL / NET: 365 mL      CAPILLARY BLOOD GLUCOSE        I&O's Summary    05 Mar 2025 07:01  -  06 Mar 2025 07:00  --------------------------------------------------------  IN: 1995 mL / OUT: 1630 mL / NET: 365 mL          PHYSICAL EXAM:    General: NAD   HEENT:  non-icteric  Neck:  symmetric,  Respiratory: Coarse breathsounds bilaterally with apparent secretions in upper airways no Resp distress; no accessory muscle use  Cardiovascular:  RRR, no murmurs/rubs/gallops  Abdomen: Soft, NT, ND  Extremities: No edema noted  Skin: No rashes or lesions noted  Neurological: RASS 2  Psychiatry: AOx2    MEDICATIONS:  MEDICATIONS  (STANDING):  albuterol/ipratropium for Nebulization 3 milliLiter(s) Nebulizer every 6 hours  atorvastatin 20 milliGRAM(s) Oral at bedtime  cholecalciferol 1000 Unit(s) Oral daily  erythromycin   Ointment 1 Application(s) Both EYES four times a day  escitalopram 15 milliGRAM(s) Oral with breakfast  gabapentin Solution 150 milliGRAM(s) Oral every 12 hours  heparin  Infusion. 1700 Unit(s)/Hr (17 mL/Hr) IV Continuous <Continuous>  influenza  Vaccine (HIGH DOSE) 0.5 milliLiter(s) IntraMuscular once  lacosamide IVPB 200 milliGRAM(s) IV Intermittent every 12 hours  lactated ringers. 1000 milliLiter(s) (50 mL/Hr) IV Continuous <Continuous>  lamoTRIgine 50 milliGRAM(s) Oral two times a day  levETIRAcetam   Injectable 750 milliGRAM(s) IV Push every 12 hours  lurasidone 20 milliGRAM(s) Oral at bedtime  metoprolol tartrate 12.5 milliGRAM(s) Oral two times a day  ofloxacin 0.3% Solution 1 Drop(s) Right EYE every 4 hours  pantoprazole  Injectable 40 milliGRAM(s) IV Push daily  polyethylene glycol 3350 17 Gram(s) Oral daily  senna Syrup 10 milliLiter(s) Oral at bedtime  sevelamer carbonate Powder 1600 milliGRAM(s) Oral every 8 hours  sodium chloride 3%  Inhalation 4 milliLiter(s) Inhalation every 6 hours    MEDICATIONS  (PRN):  artificial tears (preservative free) Ophthalmic Solution 1 Drop(s) Both EYES every 6 hours PRN Dry Eyes      ALLERGIES:  Allergies    seasonal allergies (Unknown)  penicillin (Hives)    Intolerances    latex (Rash)      LABS:                        8.0    9.52  )-----------( 167      ( 06 Mar 2025 00:18 )             26.1     03-06    144  |  106  |  102[H]  ----------------------------<  102[H]  4.8   |  23  |  2.92[H]    Ca    9.8      06 Mar 2025 00:11  Phos  5.6     03-06  Mg     2.9     03-06    TPro  7.2  /  Alb  3.1[L]  /  TBili  0.4  /  DBili  x   /  AST  29  /  ALT  30  /  AlkPhos  130[H]  03-06    PT/INR - ( 06 Mar 2025 00:18 )   PT: 11.9 sec;   INR: 1.04 ratio         PTT - ( 06 Mar 2025 00:18 )  PTT:62.4 sec  ABG - ( 06 Mar 2025 00:02 )  pH, Arterial: 7.40  pH, Blood: x     /  pCO2: 44    /  pO2: 106   / HCO3: 27    / Base Excess: 2.2   /  SaO2: 98.4              Urinalysis Basic - ( 06 Mar 2025 00:11 )    Color: x / Appearance: x / SG: x / pH: x  Gluc: 102 mg/dL / Ketone: x  / Bili: x / Urobili: x   Blood: x / Protein: x / Nitrite: x   Leuk Esterase: x / RBC: x / WBC x   Sq Epi: x / Non Sq Epi: x / Bacteria: x          RADIOLOGY & ADDITIONAL TESTS: Reviewed.

## 2025-03-06 NOTE — PROGRESS NOTE ADULT - PROBLEM SELECTOR PLAN 7
Continue with home atorvastatin 20 mg bedtime for HLD, vitamin D supplementation, pantoprazole 40 mg daily, senna 2 tab bedtime, and Miralax 17 gram daily.     General  - DVT prophylaxis: heparin 5000 units q8h --> hep gtt per ICU  - Diet: regular   - Medication reconciliation: complete   - Code: Full   - Medications: Tylenol 650 mg q6h as needed for pain, Maalox 30 mL q4h as needed for acid reflux, melatonin 3 mg bedtime as needed for insomnia, Zofran 4 mg IV q8h as needed for nausea/vomiting      2/16/25 --> plan was discussed with patient's brother Fernando (over the phone, [phone]) in detail. He agrees with plan. All questions answered. He asked that I update Ester (sister, [phone]); she was called and updated as well, also agrees with the plan, also answered all questions.

## 2025-03-06 NOTE — PROGRESS NOTE ADULT - SUBJECTIVE AND OBJECTIVE BOX
[facility]  JACINTO Horton Y. Patel, S. Shah, G. Casimir  [phone]  ([phone] - weekdays after 5pm and weekends)    Name: OSCAR MILAN  Age/Gender: 69y Male  MRN: [id number]    Interval History:  Patient seen and examined this morning.   Patient resting on NC, chest PT ongoing.   Denies any pain or new complaints.   Notes reviewed  No concerning overnight events  Afebrile   Allergies: seasonal allergies (Unknown)  penicillin (Hives)      Objective:  Vitals:   T(F): 99.7 (03-06-25 @ 04:00), Max: 99.9 (03-06-25 @ 00:00)  HR: 87 (03-06-25 @ 09:10) (87 - 112)  BP: 124/60 (03-06-25 @ 07:00) (122/64 - 174/82)  RR: 31 (03-06-25 @ 07:00) (20 - 38)  SpO2: 97% (03-06-25 @ 09:10) (89% - 100%)  Physical Examination:  General: no acute distress, NC   HEENT: NC/AT, anicteric, upper lip scab  Respiratory: decreased breath sounds bilaterally   Cardiovascular: S1 S2 present, normal rate   Gastrointestinal: soft, nondistended, nontender   Extremities: no LE edema, no cyanosis  Skin: no visible rash    Laboratory Studies:  CBC:                       8.0    9.52  )-----------( 167      ( 06 Mar 2025 00:18 )             26.1     WBC Trend:  9.52 03-06-25 @ 00:18  8.84 03-05-25 @ 00:15  8.48 03-04-25 @ 00:40  7.06 03-03-25 @ 12:41  7.14 03-03-25 @ 00:57  8.02 03-03-25 @ 00:01  9.10 03-02-25 @ 12:01  9.10 03-02-25 @ 03:49  7.36 03-01-25 @ 00:50  8.31 02-28-25 @ 00:29    CMP: 03-06    144  |  106  |  102[H]  ----------------------------<  102[H]  4.8   |  23  |  2.92[H]    Ca    9.8      06 Mar 2025 00:11  Phos  5.6     03-06  Mg     2.9     03-06    TPro  7.2  /  Alb  3.1[L]  /  TBili  0.4  /  DBili  x   /  AST  29  /  ALT  30  /  AlkPhos  130[H]  03-06    Creatinine: 2.92 mg/dL (03-06-25 @ 00:11)  Creatinine: 3.31 mg/dL (03-05-25 @ 00:15)  Creatinine: 3.99 mg/dL (03-04-25 @ 00:26)  Creatinine: 4.34 mg/dL (03-03-25 @ 00:01)  Creatinine: 4.06 mg/dL (03-02-25 @ 12:01)  Creatinine: 4.11 mg/dL (03-02-25 @ 03:48)  Creatinine: 4.28 mg/dL (03-01-25 @ 12:36)  Creatinine: 4.24 mg/dL (03-01-25 @ 00:50)  Creatinine: 3.89 mg/dL (02-28-25 @ 12:33)  Creatinine: 4.01 mg/dL (02-28-25 @ 00:29)    LIVER FUNCTIONS - ( 06 Mar 2025 00:11 )  Alb: 3.1 g/dL / Pro: 7.2 g/dL / ALK PHOS: 130 U/L / ALT: 30 U/L / AST: 29 U/L / GGT: x           Microbiology: reviewed   Culture - Eye (collected 03-05-25 @ 16:15)  Source: Eye Conjunctiva-Right  Gram Stain (03-06-25 @ 02:33):    No polymorphonuclear cells seen    No organisms seen    by cytocentrifuge    Culture - Blood (collected 02-21-25 @ 19:36)  Source: .Blood Blood  Final Report (02-27-25 @ 02:01):    No growth at 5 days    Culture - Blood (collected 02-21-25 @ 19:35)  Source: .Blood Blood  Final Report (02-27-25 @ 02:01):    No growth at 5 days    Radiology: reviewed     Medications:  albuterol/ipratropium for Nebulization 3 milliLiter(s) Nebulizer every 6 hours  artificial tears (preservative free) Ophthalmic Solution 1 Drop(s) Both EYES every 6 hours PRN  atorvastatin 20 milliGRAM(s) Oral at bedtime  cholecalciferol 1000 Unit(s) Oral daily  erythromycin   Ointment 1 Application(s) Both EYES four times a day  escitalopram 15 milliGRAM(s) Oral with breakfast  gabapentin Solution 150 milliGRAM(s) Oral every 12 hours  heparin  Infusion. 1700 Unit(s)/Hr IV Continuous <Continuous>  influenza  Vaccine (HIGH DOSE) 0.5 milliLiter(s) IntraMuscular once  lacosamide IVPB 200 milliGRAM(s) IV Intermittent every 12 hours  lactated ringers. 1000 milliLiter(s) IV Continuous <Continuous>  lamoTRIgine 50 milliGRAM(s) Oral two times a day  levETIRAcetam   Injectable 750 milliGRAM(s) IV Push every 12 hours  lurasidone 20 milliGRAM(s) Oral at bedtime  metoprolol tartrate 12.5 milliGRAM(s) Oral two times a day  ofloxacin 0.3% Solution 1 Drop(s) Right EYE every 4 hours  pantoprazole  Injectable 40 milliGRAM(s) IV Push daily  polyethylene glycol 3350 17 Gram(s) Oral daily  senna Syrup 10 milliLiter(s) Oral at bedtime  sevelamer carbonate Powder 1600 milliGRAM(s) Oral every 8 hours  sodium chloride 3%  Inhalation 4 milliLiter(s) Inhalation every 6 hours    Current Antimicrobials:    Prior/Completed Antimicrobials:  piperacillin/tazobactam IVPB..  piperacillin/tazobactam IVPB..  piperacillin/tazobactam IVPB...  remdesivir  IVPB  remdesivir  IVPB  remdesivir  IVPB  vancomycin  IVPB.

## 2025-03-06 NOTE — ADVANCED PRACTICE NURSE CONSULT - ASSESSMENT
Reason for Admission: Acute on chronic hypoxemic respiratory failure  History of Present Illness:   [facility] Division of Hospital Medicine  Cas Leung MD  Available via MS Teams    69 year old male with a past medical history of hypertension, hyperlipemia VAZQUEZ (on CPAP at bedtime, NC 2 liters during the day), CKD stage 3, epilepsy, schizophrenia, developmental delay, metastatic testicular cancer (s/p orchiectomy, actively on chemotherapy, last dose of etoposide/cisplatin on 6/2024) presenting with worsening shortness of breath.     Patient was recently hospitalized at [facility] from January 27th 2025 to February 6th 2025 for seizure and influenza virus infection, which required intubation. He was sent to rehab (originally from home) from hospital.     He returns due to sudden onset shortness of breath and worsening oxygenation. Of note, he tested positive for COVID-19 at facility on 2/13/25 and was not put on any COVID-19 treatment at the time, only guaifenesin and scopolamine patch. Patient was placed on non-rebreather and sent to the hospital on 2/16/25.     In the ER, vital signs significant for T-max 101.9F, HR 84-90. WBC 6.8. Hemoglobin 9.1. Platelet 87. AST 70. ALT 48. Cr 3.27. BUN 45. pH 7.30, pCO2 48. UA negative for infection. Patient given vancomycin, Zosyn, albuterol, and  cc bolus. Patient admitted to the general medicine service for further care.     Patient evaluated at bedside during admission. Unable to talk with the patient given that he is on BIPAP. Attempted to take the patient off BIPAP while in the room, but patient's work of breathing immediately began to increase and patient endorsed shortness of breath, so he was placed back on. History taken by chart review.     Met with the patient on 5MIC. Patient awake, lying on a low air loss mattress being straight catheterized by RN upon C RN arrival. Patient consented to a skin consult post catheterization. Patient unable to turn and position without assistance. Upon assessment, B/L groin and scrotal area with mild, diffuse erythema. Satellite lesions present. Patient turned to his left side for an assessment of his sacrum/buttocks also with diffuse erythema with satellite lesions present. B/L buttocks with friction injuries 2/2 moisture. Reason for Admission: Acute on chronic hypoxemic respiratory failure  History of Present Illness:   [facility] Division of Hospital Medicine  Cas Leung MD  Available via MS Teams    69 year old male with a past medical history of hypertension, hyperlipemia VAZQUEZ (on CPAP at bedtime, NC 2 liters during the day), CKD stage 3, epilepsy, schizophrenia, developmental delay, metastatic testicular cancer (s/p orchiectomy, actively on chemotherapy, last dose of etoposide/cisplatin on 6/2024) presenting with worsening shortness of breath.     Patient was recently hospitalized at [facility] from January 27th 2025 to February 6th 2025 for seizure and influenza virus infection, which required intubation. He was sent to rehab (originally from home) from hospital.     He returns due to sudden onset shortness of breath and worsening oxygenation. Of note, he tested positive for COVID-19 at facility on 2/13/25 and was not put on any COVID-19 treatment at the time, only guaifenesin and scopolamine patch. Patient was placed on non-rebreather and sent to the hospital on 2/16/25.     In the ER, vital signs significant for T-max 101.9F, HR 84-90. WBC 6.8. Hemoglobin 9.1. Platelet 87. AST 70. ALT 48. Cr 3.27. BUN 45. pH 7.30, pCO2 48. UA negative for infection. Patient given vancomycin, Zosyn, albuterol, and  cc bolus. Patient admitted to the general medicine service for further care.     Patient evaluated at bedside during admission. Unable to talk with the patient given that he is on BIPAP. Attempted to take the patient off BIPAP while in the room, but patient's work of breathing immediately began to increase and patient endorsed shortness of breath, so he was placed back on. History taken by chart review.     Met with the patient on 5MIC. Patient awake, lying on a low air loss mattress being straight catheterized by RN upon C RN arrival. Patient consented to a skin consult post catheterization. Patient unable to turn and position without assistance. Upon assessment, B/L groin and scrotal area with mild, diffuse erythema. Satellite lesions present. Patient turned to his left side for an assessment of his sacrum/buttocks with diffuse erythema and satellite lesions also present. B/L buttocks with friction injuries 2/2 moisture. Met with the patient on 5MIC. Patient awake, lying on a low air loss mattress being straight catheterized by RN upon C RN arrival. Patient consented to a skin consult post catheterization. Patient unable to turn and position without assistance. Upon assessment, B/L groin and scrotal area with mild, diffuse erythema. Satellite lesions present. Patient turned to his left side for an assessment of his sacrum/buttocks with diffuse erythema and satellite lesions also present. B/L buttocks with friction injuries 2/2 moisture.

## 2025-03-06 NOTE — ADVANCED PRACTICE NURSE CONSULT - REASON FOR CONSULT
Consult initiated by RN for radiation dermatitis to the b/l groin and sacrum. Consult initiated by RN for radiation dermatitis to the b/l groin and sacrum.    Reason for Admission: Acute on chronic hypoxemic respiratory failure  History of Present Illness:   [facility] Division of Hospital Medicine  Cas Leung MD  Available via MS Teams    69 year old male with a past medical history of hypertension, hyperlipemia VAZQUEZ (on CPAP at bedtime, NC 2 liters during the day), CKD stage 3, epilepsy, schizophrenia, developmental delay, metastatic testicular cancer (s/p orchiectomy, actively on chemotherapy, last dose of etoposide/cisplatin on 6/2024) presenting with worsening shortness of breath.     Patient was recently hospitalized at [facility] from January 27th 2025 to February 6th 2025 for seizure and influenza virus infection, which required intubation. He was sent to rehab (originally from home) from hospital.     He returns due to sudden onset shortness of breath and worsening oxygenation. Of note, he tested positive for COVID-19 at facility on 2/13/25 and was not put on any COVID-19 treatment at the time, only guaifenesin and scopolamine patch. Patient was placed on non-rebreather and sent to the hospital on 2/16/25.     In the ER, vital signs significant for T-max 101.9F, HR 84-90. WBC 6.8. Hemoglobin 9.1. Platelet 87. AST 70. ALT 48. Cr 3.27. BUN 45. pH 7.30, pCO2 48. UA negative for infection. Patient given vancomycin, Zosyn, albuterol, and  cc bolus. Patient admitted to the general medicine service for further care.     Patient evaluated at bedside during admission. Unable to talk with the patient given that he is on BIPAP. Attempted to take the patient off BIPAP while in the room, but patient's work of breathing immediately began to increase and patient endorsed shortness of breath, so he was placed back on. History taken by chart review. Consult initiated by RN for wounds to the b/l groin and sacrum.    Reason for Admission: Acute on chronic hypoxemic respiratory failure  History of Present Illness:   [facility] Division of Hospital Medicine  Cas Leung MD  Available via MS Teams    69 year old male with a past medical history of hypertension, hyperlipemia VAZQUEZ (on CPAP at bedtime, NC 2 liters during the day), CKD stage 3, epilepsy, schizophrenia, developmental delay, metastatic testicular cancer (s/p orchiectomy, actively on chemotherapy, last dose of etoposide/cisplatin on 6/2024) presenting with worsening shortness of breath.     Patient was recently hospitalized at [facility] from January 27th 2025 to February 6th 2025 for seizure and influenza virus infection, which required intubation. He was sent to rehab (originally from home) from hospital.     He returns due to sudden onset shortness of breath and worsening oxygenation. Of note, he tested positive for COVID-19 at facility on 2/13/25 and was not put on any COVID-19 treatment at the time, only guaifenesin and scopolamine patch. Patient was placed on non-rebreather and sent to the hospital on 2/16/25.     In the ER, vital signs significant for T-max 101.9F, HR 84-90. WBC 6.8. Hemoglobin 9.1. Platelet 87. AST 70. ALT 48. Cr 3.27. BUN 45. pH 7.30, pCO2 48. UA negative for infection. Patient given vancomycin, Zosyn, albuterol, and  cc bolus. Patient admitted to the general medicine service for further care.     Patient evaluated at bedside during admission. Unable to talk with the patient given that he is on BIPAP. Attempted to take the patient off BIPAP while in the room, but patient's work of breathing immediately began to increase and patient endorsed shortness of breath, so he was placed back on. History taken by chart review.

## 2025-03-06 NOTE — CHART NOTE - NSCHARTNOTEFT_GEN_A_CORE
69YM w/pmhx of hypertension, hyperlipemia VAZQUEZ (on CPAP at bedtime, NC 2 liters during the day), CKD stage 3, epilepsy, schizophrenia, developmental delay, metastatic testicular cancer (s/p orchiectomy, actively on chemotherapy, last dose of etoposide/cisplatin on 6/2024) presenting with worsening shortness of breath. Patient was recently hospitalized at [facility] from January 27th 2025 to February 6th 2025 for seizure and influenza virus infection, which required intubation. He was sent to rehab (originally from home) from hospital. He returns due to sudden onset shortness of breath and worsening oxygenation. He tested positive for COVID-19 at facility on 2/13/25 and was not put on any COVID-19 treatment at the time. Patient was placed on non-rebreather and sent to the hospital on 2/16/25. 2/18 pt w/ increased respiratory requirements xfer from 6L NC to AVAPS. Started on remdesivir. 2/19 pt failed RN administered Dysphagia screen; Klebsiella in urine; transition to HFNC; RN reporting pt w/ difficulty swallowing medication and febrile overnight. 2/20 HFNC lowered to 50L/60% w/ sats in mid 90s. RRT x2 (2/21 and 2/23) for abnormal respiratory rate; pt intubated 2/23/25 and xfer to MICU. EEG 3/3 No epileptic discharges recorded. No seizures recorded. 3/4 patient extubated and on 4-6 L NC.     Swallow hx: Pt well known to this service with bedside and FEES completed during current hospital course. FEES (2/21/25) revealing an clare-pharyngeal dysphagia. Pt w/ premature spillage with mildly thick liquids and subsequent aspiration during the swallow. Cough response noted but ineffective in clearing material from trachea. Improve bolus control and airway protection noted with puree and moderately thick liquids. Rx for puree and moderately thick liquids via TSP. S/p RRT 2/21/25 for tachypnea w/ increased work of breath and started on AVAPS/BIPAP. Case discussed with DARIAN Horton w/ recommendations for patient to be NPO, with non-oral nutrition/hydration/medications given change in respiratory status.     Today: Chart reviewed and case discussed with MD Vines. Patient xfer to MICU now s/p extubation on 6L NC. Patient encountered semi-supine in bed on 6L NC, A&Ox1-2 with wet/gurgly vocal quality and increased work of breathing upon effort. Thorough oral care provided with evidence of reduced oral and pharyngeal secretion management. As per discussion with RN, pt requiring frequent oral and nasal suctioning. Subjective PO trials contraindicated at this time given tenuous respiratory status and reduced secretion management. Service will continue to follow and re-assess as clinically indicated. Discussed with MD Vines, RN Tawanna, and patient. Patient left NAD, purposeful proactive rounding completed; 5P's addressed.     Impressions: Patient w/ acute on chronic clare-pharyngeal dysphagia superimposed upon by tenuous respiratory status and reduced secretion management.     Recommendations:  1) NPO, with non-oral nutrition/hydration/medications.   2) Maintain aspiration/reflux precautions  3) Stringent oral hygiene  4) Service to follow and re-asses as clinically indicated.     Lenka Davis MA CCC-SLP;Teams

## 2025-03-06 NOTE — GOALS OF CARE CONVERSATION - ADVANCED CARE PLANNING - CONVERSATION DETAILS
Spoke to patient's family at bedside regarding overall hospital course and future options. Explained that because of patient's heavy secretion burden requiring hourly suctioning, he would be a poor candidate for the general medicine floor and he is at a high risk for reintubation. Further discussed that, if his secretions do not improve, the primary options would be transitioning towards making patient comfortable vs placing a tracheostomy.  Family expressed that patient never had issue with lungs or secretions prior to hospitalization for influenza in January and for COVID this admission. They are hopeful that secretions will improve and that patient will be able to get back to rehab and eventually be independent again. Given this, they would be interested in pursuing a tracheostomy if secretions do not improve. They would also want him to be reintubated if necessary. Family expressed that neither they nor patient would want the patient to be bedbound for a prolonged time, so if patient's status does not improve with tracheostomy, they would consider discussing comfort-focused care then.

## 2025-03-06 NOTE — PROGRESS NOTE ADULT - SUBJECTIVE AND OBJECTIVE BOX
[facility] INPATIENT PROGRESS NOTE     Interval Hx:   03-06-25: Mr. Gr was seen at bedside today.    Meds:   MEDICATIONS  (STANDING):  albuterol/ipratropium for Nebulization 3 milliLiter(s) Nebulizer every 6 hours  atorvastatin 20 milliGRAM(s) Oral at bedtime  cholecalciferol 1000 Unit(s) Oral daily  erythromycin   Ointment 1 Application(s) Both EYES four times a day  escitalopram 15 milliGRAM(s) Oral with breakfast  gabapentin Solution 150 milliGRAM(s) Oral every 12 hours  heparin  Infusion. 1700 Unit(s)/Hr (17 mL/Hr) IV Continuous <Continuous>  influenza  Vaccine (HIGH DOSE) 0.5 milliLiter(s) IntraMuscular once  lacosamide IVPB 200 milliGRAM(s) IV Intermittent every 12 hours  lactated ringers. 1000 milliLiter(s) (50 mL/Hr) IV Continuous <Continuous>  lamoTRIgine 50 milliGRAM(s) Oral two times a day  levETIRAcetam   Injectable 750 milliGRAM(s) IV Push every 12 hours  lurasidone 20 milliGRAM(s) Oral at bedtime  metoprolol tartrate 12.5 milliGRAM(s) Oral two times a day  ofloxacin 0.3% Solution 1 Drop(s) Right EYE every 4 hours  pantoprazole  Injectable 40 milliGRAM(s) IV Push daily  polyethylene glycol 3350 17 Gram(s) Oral daily  senna Syrup 10 milliLiter(s) Oral at bedtime  sevelamer carbonate Powder 1600 milliGRAM(s) Oral every 8 hours  sodium chloride 3%  Inhalation 4 milliLiter(s) Inhalation every 6 hours    MEDICATIONS  (PRN):  artificial tears (preservative free) Ophthalmic Solution 1 Drop(s) Both EYES every 6 hours PRN Dry Eyes    Vital Signs Last 24 Hrs  T(C): 37.6 (06 Mar 2025 04:00), Max: 37.7 (06 Mar 2025 00:00)  T(F): 99.7 (06 Mar 2025 04:00), Max: 99.9 (06 Mar 2025 00:00)  HR: 100 (06 Mar 2025 04:00) (92 - 112)  BP: 162/77 (06 Mar 2025 04:00) (119/69 - 174/82)  BP(mean): 111 (06 Mar 2025 04:00) (90 - 118)  RR: 30 (06 Mar 2025 04:00) (20 - 38)  SpO2: 99% (06 Mar 2025 04:00) (89% - 100%)    Parameters below as of 05 Mar 2025 23:25  Patient On (Oxygen Delivery Method): BiPAP/CPAP    Physical Exam:  Gen: Intubated  HEENT: EOMI, MMM  Chest: equal chest rise  Cardiac: regular   Abd: non distended    Labs:                        8.0    9.52  )-----------( 167      ( 06 Mar 2025 00:18 )             26.1     CBC Full  -  ( 06 Mar 2025 00:18 )  WBC Count : 9.52 K/uL  RBC Count : 2.56 M/uL  Hemoglobin : 8.0 g/dL  Hematocrit : 26.1 %  Platelet Count - Automated : 167 K/uL  Mean Cell Volume : 102.0 fl  Mean Cell Hemoglobin : 31.3 pg  Mean Cell Hemoglobin Concentration : 30.7 g/dL    03-06    144  |  106  |  102[H]  ----------------------------<  102[H]  4.8   |  23  |  2.92[H]    Ca    9.8      06 Mar 2025 00:11  Phos  5.6     03-06  Mg     2.9     03-06    TPro  7.2  /  Alb  3.1[L]  /  TBili  0.4  /  DBili  x   /  AST  29  /  ALT  30  /  AlkPhos  130[H]  03-06    PT/INR - ( 06 Mar 2025 00:18 )   PT: 11.9 sec;   INR: 1.04 ratio         PTT - ( 06 Mar 2025 00:18 )  PTT:62.4 sec  Bilirubin Total: 0.4 mg/dL (03-06-25 @ 00:11)   [facility] INPATIENT PROGRESS NOTE     Interval Hx:   03-06-25: Mr. Gr was seen at bedside today, no overnight events noted, nursing staff at bedside, bipap overnight, without signs of bleeding, counts noted stable, renal function improving     Meds:   MEDICATIONS  (STANDING):  albuterol/ipratropium for Nebulization 3 milliLiter(s) Nebulizer every 6 hours  atorvastatin 20 milliGRAM(s) Oral at bedtime  cholecalciferol 1000 Unit(s) Oral daily  erythromycin   Ointment 1 Application(s) Both EYES four times a day  escitalopram 15 milliGRAM(s) Oral with breakfast  gabapentin Solution 150 milliGRAM(s) Oral every 12 hours  heparin  Infusion. 1700 Unit(s)/Hr (17 mL/Hr) IV Continuous <Continuous>  influenza  Vaccine (HIGH DOSE) 0.5 milliLiter(s) IntraMuscular once  lacosamide IVPB 200 milliGRAM(s) IV Intermittent every 12 hours  lactated ringers. 1000 milliLiter(s) (50 mL/Hr) IV Continuous <Continuous>  lamoTRIgine 50 milliGRAM(s) Oral two times a day  levETIRAcetam   Injectable 750 milliGRAM(s) IV Push every 12 hours  lurasidone 20 milliGRAM(s) Oral at bedtime  metoprolol tartrate 12.5 milliGRAM(s) Oral two times a day  ofloxacin 0.3% Solution 1 Drop(s) Right EYE every 4 hours  pantoprazole  Injectable 40 milliGRAM(s) IV Push daily  polyethylene glycol 3350 17 Gram(s) Oral daily  senna Syrup 10 milliLiter(s) Oral at bedtime  sevelamer carbonate Powder 1600 milliGRAM(s) Oral every 8 hours  sodium chloride 3%  Inhalation 4 milliLiter(s) Inhalation every 6 hours    MEDICATIONS  (PRN):  artificial tears (preservative free) Ophthalmic Solution 1 Drop(s) Both EYES every 6 hours PRN Dry Eyes    Vital Signs Last 24 Hrs  T(C): 37.6 (06 Mar 2025 04:00), Max: 37.7 (06 Mar 2025 00:00)  T(F): 99.7 (06 Mar 2025 04:00), Max: 99.9 (06 Mar 2025 00:00)  HR: 100 (06 Mar 2025 04:00) (92 - 112)  BP: 162/77 (06 Mar 2025 04:00) (119/69 - 174/82)  BP(mean): 111 (06 Mar 2025 04:00) (90 - 118)  RR: 30 (06 Mar 2025 04:00) (20 - 38)  SpO2: 99% (06 Mar 2025 04:00) (89% - 100%)    Parameters below as of 05 Mar 2025 23:25  Patient On (Oxygen Delivery Method): BiPAP/CPAP    Physical Exam:  Gen: NAD  HEENT: EOMI, MMM  Chest: equal chest rise  Cardiac: regular   Abd: non distended    Labs:                        8.0    9.52  )-----------( 167      ( 06 Mar 2025 00:18 )             26.1     CBC Full  -  ( 06 Mar 2025 00:18 )  WBC Count : 9.52 K/uL  RBC Count : 2.56 M/uL  Hemoglobin : 8.0 g/dL  Hematocrit : 26.1 %  Platelet Count - Automated : 167 K/uL  Mean Cell Volume : 102.0 fl  Mean Cell Hemoglobin : 31.3 pg  Mean Cell Hemoglobin Concentration : 30.7 g/dL    03-06    144  |  106  |  102[H]  ----------------------------<  102[H]  4.8   |  23  |  2.92[H]    Ca    9.8      06 Mar 2025 00:11  Phos  5.6     03-06  Mg     2.9     03-06    TPro  7.2  /  Alb  3.1[L]  /  TBili  0.4  /  DBili  x   /  AST  29  /  ALT  30  /  AlkPhos  130[H]  03-06    PT/INR - ( 06 Mar 2025 00:18 )   PT: 11.9 sec;   INR: 1.04 ratio         PTT - ( 06 Mar 2025 00:18 )  PTT:62.4 sec  Bilirubin Total: 0.4 mg/dL (03-06-25 @ 00:11)

## 2025-03-06 NOTE — PROGRESS NOTE ADULT - SUBJECTIVE AND OBJECTIVE BOX
Date of Service: 03-06-25 @ 14:15    Patient is a 69y old  Male who presents with a chief complaint of Acute on chronic hypoxemic respiratory failure (06 Mar 2025 09:27)      Any change in ROS:   Awake & alert  Denies CP, SOB    MEDICATIONS  (STANDING):  albuterol/ipratropium for Nebulization 3 milliLiter(s) Nebulizer every 6 hours  atorvastatin 20 milliGRAM(s) Oral at bedtime  cholecalciferol 1000 Unit(s) Oral daily  erythromycin   Ointment 1 Application(s) Both EYES four times a day  escitalopram 15 milliGRAM(s) Oral with breakfast  gabapentin Solution 150 milliGRAM(s) Oral every 12 hours  heparin  Infusion. 1700 Unit(s)/Hr (17 mL/Hr) IV Continuous <Continuous>  influenza  Vaccine (HIGH DOSE) 0.5 milliLiter(s) IntraMuscular once  lacosamide IVPB 200 milliGRAM(s) IV Intermittent every 12 hours  lactated ringers. 1000 milliLiter(s) (50 mL/Hr) IV Continuous <Continuous>  lamoTRIgine 50 milliGRAM(s) Oral two times a day  lanolin Ointment 1 Application(s) Topical daily  levETIRAcetam   Injectable 750 milliGRAM(s) IV Push every 12 hours  lurasidone 20 milliGRAM(s) Oral at bedtime  metoprolol tartrate 12.5 milliGRAM(s) Oral two times a day  nystatin Powder 1 Application(s) Topical two times a day  ofloxacin 0.3% Solution 1 Drop(s) Right EYE every 4 hours  pantoprazole  Injectable 40 milliGRAM(s) IV Push daily  polyethylene glycol 3350 17 Gram(s) Oral daily  senna Syrup 10 milliLiter(s) Oral at bedtime  sevelamer carbonate Powder 1600 milliGRAM(s) Oral every 8 hours  sodium chloride 3%  Inhalation 4 milliLiter(s) Inhalation every 6 hours    MEDICATIONS  (PRN):  artificial tears (preservative free) Ophthalmic Solution 1 Drop(s) Both EYES every 6 hours PRN Dry Eyes    Vital Signs Last 24 Hrs  T(C): 36.9 (06 Mar 2025 12:00), Max: 37.7 (06 Mar 2025 00:00)  T(F): 98.4 (06 Mar 2025 12:00), Max: 99.9 (06 Mar 2025 00:00)  HR: 96 (06 Mar 2025 12:00) (87 - 111)  BP: 116/56 (06 Mar 2025 12:00) (116/56 - 174/82)  BP(mean): 80 (06 Mar 2025 12:00) (80 - 118)  RR: 20 (06 Mar 2025 12:00) (20 - 38)  SpO2: 98% (06 Mar 2025 12:00) (92% - 100%)    Parameters below as of 06 Mar 2025 11:15  Patient On (Oxygen Delivery Method): nasal cannula        I&O's Summary    05 Mar 2025 07:01  -  06 Mar 2025 07:00  --------------------------------------------------------  IN: 1995 mL / OUT: 1630 mL / NET: 365 mL    06 Mar 2025 07:01  -  06 Mar 2025 14:15  --------------------------------------------------------  IN: 360 mL / OUT: 0 mL / NET: 360 mL          Physical Exam:   GENERAL: NAD, well-groomed, well-developed  HEENT: BREE/   Atraumatic, Normocephalic  ENMT: No tonsillar erythema, exudates, or enlargement; Moist mucous membranes, Good dentition, No lesions  NECK: Supple, No JVD, Normal thyroid  CHEST/LUNG: Coarse bs b/l   CVS: Regular rate and rhythm; No murmurs, rubs, or gallops  GI: : Soft, Nontender, Nondistended; Bowel sounds present  NERVOUS SYSTEM:  Alert & Oriented X3  EXTREMITIES:  2+ Peripheral Pulses, No clubbing, cyanosis, or edema  LYMPH: No lymphadenopathy noted  SKIN: No rashes or lesions  ENDOCRINOLOGY: No Thyromegaly  PSYCH: Appropriate    Labs:  ABG - ( 06 Mar 2025 00:02 )  pH, Arterial: 7.40  pH, Blood: x     /  pCO2: 44    /  pO2: 106   / HCO3: 27    / Base Excess: 2.2   /  SaO2: 98.4            44, 40.0                            8.0    9.52  )-----------( 167      ( 06 Mar 2025 00:18 )             26.1                         8.0    8.84  )-----------( 142      ( 05 Mar 2025 00:15 )             25.8                         7.3    8.48  )-----------( 109      ( 04 Mar 2025 00:40 )             22.8                         7.4    7.06  )-----------( 115      ( 03 Mar 2025 12:41 )             22.5                         6.8    7.14  )-----------( 108      ( 03 Mar 2025 00:57 )             21.9                         5.5    8.02  )-----------( 117      ( 03 Mar 2025 00:01 )             18.0     03-06    144  |  106  |  102[H]  ----------------------------<  102[H]  4.8   |  23  |  2.92[H]  03-05    140  |  100  |  124[H]  ----------------------------<  112[H]  4.2   |  24  |  3.31[H]  03-04    140  |  96  |  126[H]  ----------------------------<  146[H]  4.0   |  23  |  3.99[H]  03-03    137  |  94[L]  |  121[H]  ----------------------------<  156[H]  4.2   |  22  |  4.34[H]    Ca    9.8      06 Mar 2025 00:11  Ca    9.3      05 Mar 2025 00:15  Phos  5.6     03-06  Phos  6.3     03-05  Mg     2.9     03-06  Mg     3.0     03-05    TPro  7.2  /  Alb  3.1[L]  /  TBili  0.4  /  DBili  x   /  AST  29  /  ALT  30  /  AlkPhos  130[H]  03-06  TPro  7.0  /  Alb  3.1[L]  /  TBili  0.4  /  DBili  x   /  AST  34  /  ALT  32  /  AlkPhos  133[H]  03-05  TPro  6.8  /  Alb  2.9[L]  /  TBili  0.3  /  DBili  x   /  AST  34  /  ALT  32  /  AlkPhos  120  03-04  TPro  7.3  /  Alb  2.7[L]  /  TBili  0.3  /  DBili  x   /  AST  35  /  ALT  37  /  AlkPhos  131[H]  03-03    CAPILLARY BLOOD GLUCOSE          LIVER FUNCTIONS - ( 06 Mar 2025 00:11 )  Alb: 3.1 g/dL / Pro: 7.2 g/dL / ALK PHOS: 130 U/L / ALT: 30 U/L / AST: 29 U/L / GGT: x           PT/INR - ( 06 Mar 2025 00:18 )   PT: 11.9 sec;   INR: 1.04 ratio         PTT - ( 06 Mar 2025 00:18 )  PTT:62.4 sec  Urinalysis Basic - ( 06 Mar 2025 00:11 )    Color: x / Appearance: x / SG: x / pH: x  Gluc: 102 mg/dL / Ketone: x  / Bili: x / Urobili: x   Blood: x / Protein: x / Nitrite: x   Leuk Esterase: x / RBC: x / WBC x   Sq Epi: x / Non Sq Epi: x / Bacteria: x            RECENT CULTURES:  03-05 @ 16:15 Eye Conjunctiva-Right       No polymorphonuclear cells seen  No organisms seen  by cytocentrifuge               Studies  < from: Xray Chest 1 View- PORTABLE-Routine (Xray Chest 1 View- PORTABLE-Routine .) (03.05.25 @ 16:53) >    ACC: [id number] EXAM:  XR CHEST PORTABLE ROUTINE 1V   ORDERED BY:  ISABELLA RODRIGEZ     PROCEDURE DATE:  03/05/2025          INTERPRETATION:  DATE OF STUDY: 3/5/25    PRIOR: 3/4/25    CLINICAL INDICATION: NG tube placement    TECHNIQUE: AP radiograph of the chest.    FINDINGS:  Enteric tube tip in stomach.  Spinal hardware unchanged. Right-sided central venous catheter with tip   in the right atrium.  Heart size is not accurately assessed on this AP film.  Stable patchy opacities bilaterally, leftgreater than right.  Stable trace bilateral pleural effusions.  No pneumothorax.    IMPRESSION:    Enteric tube terminates within the stomach.  Stable pulmonary edema.    --- End of Report ---      < end of copied text >    < from: CT Chest No Cont (02.26.25 @ 22:32) >    ACC: [id number] EXAM:  CT CHEST   ORDERED BY:  ISABELLA RODRIGEZ     PROCEDURE DATE:  02/26/2025          INTERPRETATION:  CLINICAL INFORMATION: Hypoxia. COVID positive. History   of testicular cancer.    COMPARISON: CT chest 2/17/2025.    CONTRAST/COMPLICATIONS:  IV Contrast: NONE  Oral Contrast: NONE    PROCEDURE:  CT scan of the chest was obtained without intravenous contrast.    FINDINGS:    AIRWAYS/LUNGS/PLEURA: Evaluation is limited by motion artifact. Interval   intubation with the endotracheal tube terminating approximately 3 cm   above the gueor. While the previously seen diffuse groundglass opacities   in the upper lobes have improved since 2/17/2025, there are new and   worsening areas of consolidation involving the left lower lobe, lingula   and right lower lobe in the interim. No pleural effusion.    MEDIASTINUM AND HA: Subcentimeter mediastinal nodes, likely reactive.    VESSELS: Right chest port terminating in the right atrium. 3.8 cm mid   ascending aorta.    HEART: The heart is enlarged. No pericardial effusion. Coronary artery   calcification.    VISUALIZED UPPER ABDOMEN: Enteric tube terminating in the distal stomach.   Left upper pole renal cyst.    BONES: Degenerative changes of the spine with partially visualized   thoracolumbar surgical hardware. Chronic healed bilateral rib and right   clavicular fractures.      IMPRESSION:  Since 2/17/2025, while the upper lobe groundglass opacities have   improved, there are new and worsening confluent areas of   consolidation/pneumonia in the mid to lower lungs.          < end of copied text >

## 2025-03-07 LAB
ALBUMIN SERPL ELPH-MCNC: 3 G/DL — LOW (ref 3.3–5)
ALP SERPL-CCNC: 149 U/L — HIGH (ref 40–120)
ALT FLD-CCNC: 31 U/L — SIGNIFICANT CHANGE UP (ref 10–45)
ANION GAP SERPL CALC-SCNC: 12 MMOL/L — SIGNIFICANT CHANGE UP (ref 5–17)
AST SERPL-CCNC: 29 U/L — SIGNIFICANT CHANGE UP (ref 10–40)
BASOPHILS # BLD AUTO: 0.03 K/UL — SIGNIFICANT CHANGE UP (ref 0–0.2)
BASOPHILS NFR BLD AUTO: 0.2 % — SIGNIFICANT CHANGE UP (ref 0–2)
BILIRUB SERPL-MCNC: 0.4 MG/DL — SIGNIFICANT CHANGE UP (ref 0.2–1.2)
BUN SERPL-MCNC: 85 MG/DL — HIGH (ref 7–23)
CALCIUM SERPL-MCNC: 9.9 MG/DL — SIGNIFICANT CHANGE UP (ref 8.4–10.5)
CHLORIDE SERPL-SCNC: 109 MMOL/L — HIGH (ref 96–108)
CO2 SERPL-SCNC: 25 MMOL/L — SIGNIFICANT CHANGE UP (ref 22–31)
CREAT SERPL-MCNC: 2.52 MG/DL — HIGH (ref 0.5–1.3)
EGFR: 27 ML/MIN/1.73M2 — LOW
EGFR: 27 ML/MIN/1.73M2 — LOW
EOSINOPHIL # BLD AUTO: 0.24 K/UL — SIGNIFICANT CHANGE UP (ref 0–0.5)
EOSINOPHIL NFR BLD AUTO: 1.6 % — SIGNIFICANT CHANGE UP (ref 0–6)
GLUCOSE BLDC GLUCOMTR-MCNC: 123 MG/DL — HIGH (ref 70–99)
GLUCOSE BLDC GLUCOMTR-MCNC: 124 MG/DL — HIGH (ref 70–99)
GLUCOSE SERPL-MCNC: 127 MG/DL — HIGH (ref 70–99)
HCT VFR BLD CALC: 27.7 % — LOW (ref 39–50)
HGB BLD-MCNC: 8.4 G/DL — LOW (ref 13–17)
IMM GRANULOCYTES NFR BLD AUTO: 0.9 % — SIGNIFICANT CHANGE UP (ref 0–0.9)
LYMPHOCYTES # BLD AUTO: 0.61 K/UL — LOW (ref 1–3.3)
LYMPHOCYTES # BLD AUTO: 4.1 % — LOW (ref 13–44)
MAGNESIUM SERPL-MCNC: 2.7 MG/DL — HIGH (ref 1.6–2.6)
MCHC RBC-ENTMCNC: 30.3 G/DL — LOW (ref 32–36)
MCHC RBC-ENTMCNC: 31.6 PG — SIGNIFICANT CHANGE UP (ref 27–34)
MCV RBC AUTO: 104.1 FL — HIGH (ref 80–100)
MONOCYTES # BLD AUTO: 0.71 K/UL — SIGNIFICANT CHANGE UP (ref 0–0.9)
MONOCYTES NFR BLD AUTO: 4.8 % — SIGNIFICANT CHANGE UP (ref 2–14)
NEUTROPHILS # BLD AUTO: 13.1 K/UL — HIGH (ref 1.8–7.4)
NEUTROPHILS NFR BLD AUTO: 88.4 % — HIGH (ref 43–77)
NRBC BLD AUTO-RTO: 0 /100 WBCS — SIGNIFICANT CHANGE UP (ref 0–0)
PHOSPHATE SERPL-MCNC: 4 MG/DL — SIGNIFICANT CHANGE UP (ref 2.5–4.5)
PLATELET # BLD AUTO: 174 K/UL — SIGNIFICANT CHANGE UP (ref 150–400)
POTASSIUM SERPL-MCNC: 4.6 MMOL/L — SIGNIFICANT CHANGE UP (ref 3.5–5.3)
POTASSIUM SERPL-SCNC: 4.6 MMOL/L — SIGNIFICANT CHANGE UP (ref 3.5–5.3)
PROT SERPL-MCNC: 7 G/DL — SIGNIFICANT CHANGE UP (ref 6–8.3)
RBC # BLD: 2.66 M/UL — LOW (ref 4.2–5.8)
RBC # FLD: 15.6 % — HIGH (ref 10.3–14.5)
SODIUM SERPL-SCNC: 146 MMOL/L — HIGH (ref 135–145)
WBC # BLD: 14.83 K/UL — HIGH (ref 3.8–10.5)
WBC # FLD AUTO: 14.83 K/UL — HIGH (ref 3.8–10.5)

## 2025-03-07 PROCEDURE — 71045 X-RAY EXAM CHEST 1 VIEW: CPT | Mod: 26

## 2025-03-07 PROCEDURE — 99233 SBSQ HOSP IP/OBS HIGH 50: CPT | Mod: GC

## 2025-03-07 RX ORDER — TRANEXAMIC ACID 1000 MG/10
5 AMPUL (ML) INTRAVENOUS ONCE
Refills: 0 | Status: DISCONTINUED | OUTPATIENT
Start: 2025-03-07 | End: 2025-03-10

## 2025-03-07 RX ORDER — PIPERACILLIN-TAZO-DEXTROSE,ISO 3.375G/5
3.38 IV SOLUTION, PIGGYBACK PREMIX FROZEN(ML) INTRAVENOUS EVERY 8 HOURS
Refills: 0 | Status: DISCONTINUED | OUTPATIENT
Start: 2025-03-08 | End: 2025-03-09

## 2025-03-07 RX ORDER — PIPERACILLIN-TAZO-DEXTROSE,ISO 3.375G/5
3.38 IV SOLUTION, PIGGYBACK PREMIX FROZEN(ML) INTRAVENOUS ONCE
Refills: 0 | Status: COMPLETED | OUTPATIENT
Start: 2025-03-08 | End: 2025-03-08

## 2025-03-07 RX ORDER — PIPERACILLIN-TAZO-DEXTROSE,ISO 3.375G/5
3.38 IV SOLUTION, PIGGYBACK PREMIX FROZEN(ML) INTRAVENOUS ONCE
Refills: 0 | Status: COMPLETED | OUTPATIENT
Start: 2025-03-07 | End: 2025-03-07

## 2025-03-07 RX ORDER — LABETALOL HYDROCHLORIDE 200 MG/1
5 TABLET, FILM COATED ORAL ONCE
Refills: 0 | Status: COMPLETED | OUTPATIENT
Start: 2025-03-07 | End: 2025-03-07

## 2025-03-07 RX ADMIN — POLYETHYLENE GLYCOL 3350 17 GRAM(S): 17 POWDER, FOR SOLUTION ORAL at 12:07

## 2025-03-07 RX ADMIN — LEVETIRACETAM 750 MILLIGRAM(S): 10 INJECTION, SOLUTION INTRAVENOUS at 06:42

## 2025-03-07 RX ADMIN — OFLOXACIN 1 DROP(S): 3 SOLUTION OPHTHALMIC at 15:00

## 2025-03-07 RX ADMIN — SODIUM CHLORIDE 50 MILLILITER(S): 9 INJECTION, SOLUTION INTRAVENOUS at 19:43

## 2025-03-07 RX ADMIN — NYSTATIN 1 APPLICATION(S): 100000 CREAM TOPICAL at 17:31

## 2025-03-07 RX ADMIN — LABETALOL HYDROCHLORIDE 5 MILLIGRAM(S): 200 TABLET, FILM COATED ORAL at 17:31

## 2025-03-07 RX ADMIN — Medication 40 MILLIGRAM(S): at 12:07

## 2025-03-07 RX ADMIN — GABAPENTIN 150 MILLIGRAM(S): 400 CAPSULE ORAL at 17:30

## 2025-03-07 RX ADMIN — Medication 4 MILLILITER(S): at 17:12

## 2025-03-07 RX ADMIN — LACOSAMIDE 140 MILLIGRAM(S): 150 TABLET, FILM COATED ORAL at 06:28

## 2025-03-07 RX ADMIN — METOPROLOL SUCCINATE 12.5 MILLIGRAM(S): 50 TABLET, EXTENDED RELEASE ORAL at 06:29

## 2025-03-07 RX ADMIN — OFLOXACIN 1 DROP(S): 3 SOLUTION OPHTHALMIC at 17:32

## 2025-03-07 RX ADMIN — IPRATROPIUM BROMIDE AND ALBUTEROL SULFATE 3 MILLILITER(S): .5; 2.5 SOLUTION RESPIRATORY (INHALATION) at 11:14

## 2025-03-07 RX ADMIN — ESCITALOPRAM OXALATE 15 MILLIGRAM(S): 20 TABLET ORAL at 08:33

## 2025-03-07 RX ADMIN — LAMOTRIGINE 50 MILLIGRAM(S): 150 TABLET ORAL at 17:30

## 2025-03-07 RX ADMIN — HEPARIN SODIUM 1500 UNIT(S)/HR: 1000 INJECTION INTRAVENOUS; SUBCUTANEOUS at 19:44

## 2025-03-07 RX ADMIN — Medication 4 MILLILITER(S): at 23:45

## 2025-03-07 RX ADMIN — Medication 1 APPLICATION(S): at 12:07

## 2025-03-07 RX ADMIN — OFLOXACIN 1 DROP(S): 3 SOLUTION OPHTHALMIC at 01:16

## 2025-03-07 RX ADMIN — LACOSAMIDE 140 MILLIGRAM(S): 150 TABLET, FILM COATED ORAL at 17:31

## 2025-03-07 RX ADMIN — IPRATROPIUM BROMIDE AND ALBUTEROL SULFATE 3 MILLILITER(S): .5; 2.5 SOLUTION RESPIRATORY (INHALATION) at 05:04

## 2025-03-07 RX ADMIN — METOPROLOL SUCCINATE 12.5 MILLIGRAM(S): 50 TABLET, EXTENDED RELEASE ORAL at 17:30

## 2025-03-07 RX ADMIN — ERYTHROMYCIN 1 APPLICATION(S): 5 OINTMENT OPHTHALMIC at 12:07

## 2025-03-07 RX ADMIN — SEVELAMER HYDROCHLORIDE 1600 MILLIGRAM(S): 800 TABLET ORAL at 15:01

## 2025-03-07 RX ADMIN — NYSTATIN 1 APPLICATION(S): 100000 CREAM TOPICAL at 06:29

## 2025-03-07 RX ADMIN — ERYTHROMYCIN 1 APPLICATION(S): 5 OINTMENT OPHTHALMIC at 06:30

## 2025-03-07 RX ADMIN — IPRATROPIUM BROMIDE AND ALBUTEROL SULFATE 3 MILLILITER(S): .5; 2.5 SOLUTION RESPIRATORY (INHALATION) at 17:12

## 2025-03-07 RX ADMIN — IPRATROPIUM BROMIDE AND ALBUTEROL SULFATE 3 MILLILITER(S): .5; 2.5 SOLUTION RESPIRATORY (INHALATION) at 23:45

## 2025-03-07 RX ADMIN — ERYTHROMYCIN 1 APPLICATION(S): 5 OINTMENT OPHTHALMIC at 17:31

## 2025-03-07 RX ADMIN — Medication 1000 UNIT(S): at 12:08

## 2025-03-07 RX ADMIN — GABAPENTIN 150 MILLIGRAM(S): 400 CAPSULE ORAL at 06:32

## 2025-03-07 RX ADMIN — Medication 4 MILLILITER(S): at 05:04

## 2025-03-07 RX ADMIN — SEVELAMER HYDROCHLORIDE 1600 MILLIGRAM(S): 800 TABLET ORAL at 06:28

## 2025-03-07 RX ADMIN — LEVETIRACETAM 750 MILLIGRAM(S): 10 INJECTION, SOLUTION INTRAVENOUS at 17:30

## 2025-03-07 RX ADMIN — OFLOXACIN 1 DROP(S): 3 SOLUTION OPHTHALMIC at 12:06

## 2025-03-07 RX ADMIN — ERYTHROMYCIN 1 APPLICATION(S): 5 OINTMENT OPHTHALMIC at 00:16

## 2025-03-07 RX ADMIN — Medication 4 MILLILITER(S): at 11:14

## 2025-03-07 RX ADMIN — OFLOXACIN 1 DROP(S): 3 SOLUTION OPHTHALMIC at 06:29

## 2025-03-07 RX ADMIN — LAMOTRIGINE 50 MILLIGRAM(S): 150 TABLET ORAL at 06:29

## 2025-03-07 NOTE — PROGRESS NOTE ADULT - NSPROGADDITIONALINFOA_GEN_ALL_CORE
speech & swallow eval appreciated, s/p FEES: recommends thicken pureed diet.  events reviewed, RRT for hypoxia, intubated/sedated for increased work of breathing.   transferred to ICU. ICU eval appreciated  GOC discussed, full code per the sister.   Bumex gtt for overload, now off. weaned off pressors.   completed abx, completed antiviral.   ICU care appreciated.   vEEG per ICU team: no seizures.  s/p 1 unit prbc overnight, monitor hgb.   extubated 3/4/25.  on NC.  physical therapy. monitor cough. PRN antitussives.   daily xrays.  abx if suspicion of recurrent PNA, if febrile.   ID following. d/w ID.   frequent suctioning for secretions.     - Dr. SILVA Htet ([facility])  - [phone]

## 2025-03-07 NOTE — PROGRESS NOTE ADULT - SUBJECTIVE AND OBJECTIVE BOX
SUBJECTIVE/ OVERNIGHT EVENTS:  Pt seen and examined earlier today.   in MICU  awake but sleepy  denied pain.   intermittent cough, secretions  OG tube in place    --------------------------------------------------------------------------------------------  LABS:                        8.4    14.83 )-----------( 174      ( 06 Mar 2025 23:42 )             27.7     03-06    146[H]  |  109[H]  |  85[H]  ----------------------------<  127[H]  4.6   |  25  |  2.52[H]    Ca    9.9      06 Mar 2025 23:42  Phos  4.0     03-06  Mg     2.7     03-06    TPro  7.0  /  Alb  3.0[L]  /  TBili  0.4  /  DBili  x   /  AST  29  /  ALT  31  /  AlkPhos  149[H]  03-06    PT/INR - ( 06 Mar 2025 23:42 )   PT: 13.1 sec;   INR: 1.15 ratio         PTT - ( 06 Mar 2025 23:42 )  PTT:62.7 sec  CAPILLARY BLOOD GLUCOSE      POCT Blood Glucose.: 123 mg/dL (07 Mar 2025 12:03)        Urinalysis Basic - ( 06 Mar 2025 23:42 )    Color: x / Appearance: x / SG: x / pH: x  Gluc: 127 mg/dL / Ketone: x  / Bili: x / Urobili: x   Blood: x / Protein: x / Nitrite: x   Leuk Esterase: x / RBC: x / WBC x   Sq Epi: x / Non Sq Epi: x / Bacteria: x        RADIOLOGY & ADDITIONAL TESTS:    Imaging Personally Reviewed:  [x] YES  [ ] NO    Consultant(s) Notes Reviewed:  [x] YES  [ ] NO    MEDICATIONS  (STANDING):  albuterol/ipratropium for Nebulization 3 milliLiter(s) Nebulizer every 6 hours  amLODIPine   Tablet 5 milliGRAM(s) Oral daily  atorvastatin 20 milliGRAM(s) Oral at bedtime  cholecalciferol 1000 Unit(s) Oral daily  erythromycin   Ointment 1 Application(s) Both EYES four times a day  escitalopram 15 milliGRAM(s) Oral with breakfast  gabapentin Solution 150 milliGRAM(s) Oral every 12 hours  heparin  Infusion. 1700 Unit(s)/Hr (17 mL/Hr) IV Continuous <Continuous>  influenza  Vaccine (HIGH DOSE) 0.5 milliLiter(s) IntraMuscular once  lacosamide IVPB 200 milliGRAM(s) IV Intermittent every 12 hours  lactated ringers. 1000 milliLiter(s) (50 mL/Hr) IV Continuous <Continuous>  lamoTRIgine 50 milliGRAM(s) Oral two times a day  lanolin Ointment 1 Application(s) Topical daily  levETIRAcetam   Injectable 750 milliGRAM(s) IV Push every 12 hours  lurasidone 20 milliGRAM(s) Oral at bedtime  metoprolol tartrate 12.5 milliGRAM(s) Oral two times a day  nystatin Powder 1 Application(s) Topical two times a day  ofloxacin 0.3% Solution 1 Drop(s) Right EYE every 4 hours  pantoprazole  Injectable 40 milliGRAM(s) IV Push daily  polyethylene glycol 3350 17 Gram(s) Oral daily  senna Syrup 10 milliLiter(s) Oral at bedtime  sevelamer carbonate Powder 1600 milliGRAM(s) Oral every 8 hours  sodium chloride 3%  Inhalation 4 milliLiter(s) Inhalation every 6 hours    MEDICATIONS  (PRN):  artificial tears (preservative free) Ophthalmic Solution 1 Drop(s) Both EYES every 6 hours PRN Dry Eyes      Care Discussed with Consultants/Other Providers [x] YES  [ ] NO    Vital Signs Last 24 Hrs  T(C): 36.7 (07 Mar 2025 08:00), Max: 37.2 (06 Mar 2025 20:00)  T(F): 98.1 (07 Mar 2025 08:00), Max: 99 (06 Mar 2025 20:00)  HR: 98 (07 Mar 2025 12:00) (86 - 113)  BP: 174/82 (07 Mar 2025 12:00) (106/54 - 191/90)  BP(mean): 118 (07 Mar 2025 12:00) (76 - 132)  RR: 70 (07 Mar 2025 12:00) (22 - 70)  SpO2: 99% (07 Mar 2025 12:00) (90% - 100%)    Parameters below as of 07 Mar 2025 11:18  Patient On (Oxygen Delivery Method): nasal cannula      I&O's Summary    06 Mar 2025 07:01  -  07 Mar 2025 07:00  --------------------------------------------------------  IN: 2305 mL / OUT: 700 mL / NET: 1605 mL    07 Mar 2025 07:01  -  07 Mar 2025 12:40  --------------------------------------------------------  IN: 235 mL / OUT: 0 mL / NET: 235 mL            PHYSICAL EXAM:  GENERAL: comfortable, awake, on NC  HEAD:  Atraumatic, Normocephalic  EYES: EOMI, PERRLA, conjunctiva and sclera clear  NECK: Supple, No JVD  CHEST/LUNG: mild decrease breath sounds bilaterally; No wheeze   HEART: Regular rate and rhythm; No murmurs, rubs, or gallops  ABDOMEN: Soft, Nontender, Nondistended; Bowel sounds present  Neuro: awake. able to move extremities.   EXTREMITIES:  2+ Peripheral Pulses, No clubbing, cyanosis, or edema  SKIN: No rashes or lesions

## 2025-03-07 NOTE — PROGRESS NOTE ADULT - SUBJECTIVE AND OBJECTIVE BOX
[facility]  JACINTO Horton Y. Patel, S. Shah, G. Casimir  [phone]  ([phone] - weekdays after 5pm and weekends)    Name: OSCAR MILAN  Age/Gender: 69y Male  MRN: [id number]    Interval History:  Patient seen and examined this morning.   Resting on NC, denies fever or pain.   Notes reviewed  No concerning overnight events  Afebrile   Allergies: seasonal allergies (Unknown)  penicillin (Hives)      Objective:  Vitals:   T(F): 98.1 (03-07-25 @ 08:00), Max: 99 (03-06-25 @ 20:00)  HR: 95 (03-07-25 @ 09:00) (86 - 113)  BP: 155/83 (03-07-25 @ 09:00) (106/54 - 191/90)  RR: 26 (03-07-25 @ 09:00) (20 - 54)  SpO2: 97% (03-07-25 @ 09:00) (90% - 100%)  Physical Examination:  General: no acute distress, NC   HEENT: NC/AT, anicteric, upper lip scab  Respiratory: decreased breath sounds bilaterally   Cardiovascular: S1 S2 present, normal rate   Gastrointestinal: soft, nondistended, nontender   Extremities: no LE edema, no cyanosis  Skin: no visible rash    Laboratory Studies:  CBC:                       8.4    14.83 )-----------( 174      ( 06 Mar 2025 23:42 )             27.7     WBC Trend:  14.83 03-06-25 @ 23:42  9.52 03-06-25 @ 00:18  8.84 03-05-25 @ 00:15  8.48 03-04-25 @ 00:40  7.06 03-03-25 @ 12:41  7.14 03-03-25 @ 00:57  8.02 03-03-25 @ 00:01  9.10 03-02-25 @ 12:01  9.10 03-02-25 @ 03:49  7.36 03-01-25 @ 00:50    CMP: 03-06    146[H]  |  109[H]  |  85[H]  ----------------------------<  127[H]  4.6   |  25  |  2.52[H]    Ca    9.9      06 Mar 2025 23:42  Phos  4.0     03-06  Mg     2.7     03-06    TPro  7.0  /  Alb  3.0[L]  /  TBili  0.4  /  DBili  x   /  AST  29  /  ALT  31  /  AlkPhos  149[H]  03-06    Creatinine: 2.52 mg/dL (03-06-25 @ 23:42)  Creatinine: 2.92 mg/dL (03-06-25 @ 00:11)  Creatinine: 3.31 mg/dL (03-05-25 @ 00:15)  Creatinine: 3.99 mg/dL (03-04-25 @ 00:26)  Creatinine: 4.34 mg/dL (03-03-25 @ 00:01)  Creatinine: 4.06 mg/dL (03-02-25 @ 12:01)  Creatinine: 4.11 mg/dL (03-02-25 @ 03:48)  Creatinine: 4.28 mg/dL (03-01-25 @ 12:36)  Creatinine: 4.24 mg/dL (03-01-25 @ 00:50)  Creatinine: 3.89 mg/dL (02-28-25 @ 12:33)    LIVER FUNCTIONS - ( 06 Mar 2025 23:42 )  Alb: 3.0 g/dL / Pro: 7.0 g/dL / ALK PHOS: 149 U/L / ALT: 31 U/L / AST: 29 U/L / GGT: x           Microbiology: reviewed   Culture - Eye (collected 03-05-25 @ 16:15)  Source: Eye Conjunctiva-Right  Gram Stain (03-06-25 @ 02:33):    No polymorphonuclear cells seen    No organisms seen    by cytocentrifuge  Preliminary Report (03-06-25 @ 20:36):    No growth    Culture - Blood (collected 02-21-25 @ 19:36)  Source: .Blood Blood  Final Report (02-27-25 @ 02:01):    No growth at 5 days    Culture - Blood (collected 02-21-25 @ 19:35)  Source: .Blood Blood  Final Report (02-27-25 @ 02:01):    No growth at 5 days    Radiology: reviewed     Medications:  albuterol/ipratropium for Nebulization 3 milliLiter(s) Nebulizer every 6 hours  amLODIPine   Tablet 5 milliGRAM(s) Oral daily  artificial tears (preservative free) Ophthalmic Solution 1 Drop(s) Both EYES every 6 hours PRN  atorvastatin 20 milliGRAM(s) Oral at bedtime  cholecalciferol 1000 Unit(s) Oral daily  erythromycin   Ointment 1 Application(s) Both EYES four times a day  escitalopram 15 milliGRAM(s) Oral with breakfast  gabapentin Solution 150 milliGRAM(s) Oral every 12 hours  heparin  Infusion. 1700 Unit(s)/Hr IV Continuous <Continuous>  influenza  Vaccine (HIGH DOSE) 0.5 milliLiter(s) IntraMuscular once  lacosamide IVPB 200 milliGRAM(s) IV Intermittent every 12 hours  lactated ringers. 1000 milliLiter(s) IV Continuous <Continuous>  lamoTRIgine 50 milliGRAM(s) Oral two times a day  lanolin Ointment 1 Application(s) Topical daily  levETIRAcetam   Injectable 750 milliGRAM(s) IV Push every 12 hours  lurasidone 20 milliGRAM(s) Oral at bedtime  metoprolol tartrate 12.5 milliGRAM(s) Oral two times a day  nystatin Powder 1 Application(s) Topical two times a day  ofloxacin 0.3% Solution 1 Drop(s) Right EYE every 4 hours  pantoprazole  Injectable 40 milliGRAM(s) IV Push daily  polyethylene glycol 3350 17 Gram(s) Oral daily  senna Syrup 10 milliLiter(s) Oral at bedtime  sevelamer carbonate Powder 1600 milliGRAM(s) Oral every 8 hours  sodium chloride 3%  Inhalation 4 milliLiter(s) Inhalation every 6 hours    Prior/Completed Antimicrobials:  piperacillin/tazobactam IVPB..  piperacillin/tazobactam IVPB..  piperacillin/tazobactam IVPB...  remdesivir  IVPB  remdesivir  IVPB  remdesivir  IVPB  vancomycin  IVPB.

## 2025-03-07 NOTE — PROGRESS NOTE ADULT - SUBJECTIVE AND OBJECTIVE BOX
[facility]   [phone]  RENAL FOLLOW UP NOTE  --------------------------------------------------------------------------------  Chief Complaint:    24 hour events/subjective:    seen earlier   lethargic   tachypneic       PAST HISTORY  --------------------------------------------------------------------------------  No significant changes to PMH, PSH, FHx, SHx, unless otherwise noted    ALLERGIES & MEDICATIONS  --------------------------------------------------------------------------------  Allergies    seasonal allergies (Unknown)  penicillin (Hives)    Intolerances    latex (Rash)    Standing Inpatient Medications  albuterol/ipratropium for Nebulization 3 milliLiter(s) Nebulizer every 6 hours  amLODIPine   Tablet 5 milliGRAM(s) Oral daily  atorvastatin 20 milliGRAM(s) Oral at bedtime  cholecalciferol 1000 Unit(s) Oral daily  erythromycin   Ointment 1 Application(s) Both EYES four times a day  escitalopram 15 milliGRAM(s) Oral with breakfast  gabapentin Solution 150 milliGRAM(s) Oral every 12 hours  heparin  Infusion. 1700 Unit(s)/Hr IV Continuous <Continuous>  influenza  Vaccine (HIGH DOSE) 0.5 milliLiter(s) IntraMuscular once  lacosamide IVPB 200 milliGRAM(s) IV Intermittent every 12 hours  lamoTRIgine 50 milliGRAM(s) Oral two times a day  lanolin Ointment 1 Application(s) Topical daily  levETIRAcetam   Injectable 750 milliGRAM(s) IV Push every 12 hours  lurasidone 20 milliGRAM(s) Oral at bedtime  metoprolol tartrate 12.5 milliGRAM(s) Oral two times a day  nystatin Powder 1 Application(s) Topical two times a day  ofloxacin 0.3% Solution 1 Drop(s) Right EYE every 4 hours  pantoprazole  Injectable 40 milliGRAM(s) IV Push daily  polyethylene glycol 3350 17 Gram(s) Oral daily  senna Syrup 10 milliLiter(s) Oral at bedtime  sevelamer carbonate Powder 1600 milliGRAM(s) Oral every 8 hours  sodium chloride 3%  Inhalation 4 milliLiter(s) Inhalation every 6 hours    PRN Inpatient Medications  artificial tears (preservative free) Ophthalmic Solution 1 Drop(s) Both EYES every 6 hours PRN  tranexamic acid Injectable for Topical Use 5 milliLiter(s) Topical once PRN      REVIEW OF SYSTEMS  --------------------------------------------------------------------------------      VITALS/PHYSICAL EXAM  --------------------------------------------------------------------------------  T(C): 36.4 (03-07-25 @ 13:00), Max: 37.2 (03-07-25 @ 00:00)  HR: 103 (03-07-25 @ 19:00) (86 - 118)  BP: 151/68 (03-07-25 @ 19:00) (122/60 - 195/92)  RR: 22 (03-07-25 @ 19:00) (20 - 67)  SpO2: 98% (03-07-25 @ 19:00) (94% - 100%)  Wt(kg): --        03-06-25 @ 07:01  -  03-07-25 @ 07:00  --------------------------------------------------------  IN: 2305 mL / OUT: 700 mL / NET: 1605 mL    03-07-25 @ 07:01  -  03-07-25 @ 20:01  --------------------------------------------------------  IN: 1180 mL / OUT: 0 mL / NET: 1180 mL      Physical Exam:  	    Gen: ill appearing male, overall lethargic   	Pulm: decrease bs, +coarse  remains tachypneic   	CV: No JVD. RRR, S1S2; no rub  	Abd: +BS, soft, nondistended  	UE: Warm, no cyanosis  no clubbing,  no edema;  	LE: Warm, no cyanosis  no clubbing, no edema      LABS/STUDIES  --------------------------------------------------------------------------------              8.4    14.83 >-----------<  174      [03-06-25 @ 23:42]              27.7     146  |  109  |  85  ----------------------------<  127      [03-06-25 @ 23:42]  4.6   |  25  |  2.52        Ca     9.9     [03-06-25 @ 23:42]      Mg     2.7     [03-06-25 @ 23:42]      Phos  4.0     [03-06-25 @ 23:42]    TPro  7.0  /  Alb  3.0  /  TBili  0.4  /  DBili  x   /  AST  29  /  ALT  31  /  AlkPhos  149  [03-06-25 @ 23:42]    PT/INR: PT 13.1 , INR 1.15       [03-06-25 @ 23:42]  PTT: 62.7       [03-06-25 @ 23:42]      Creatinine Trend:  SCr 2.52 [03-06 @ 23:42]  SCr 2.92 [03-06 @ 00:11]  SCr 3.31 [03-05 @ 00:15]  SCr 3.99 [03-04 @ 00:26]  SCr 4.34 [03-03 @ 00:01]          Ferritin [id number]      [02-23-25 @ 06:15]  TSH 0.87      [01-25-25 @ 11:31]

## 2025-03-07 NOTE — PROGRESS NOTE ADULT - ASSESSMENT
69 year old male with a past medical history of hypertension, hyperlipemia VAZQUEZ (on CPAP at bedtime, NC 2 liters during the day), CKD stage 3, epilepsy, schizophrenia, developmental delay, metastatic testicular cancer (s/p orchiectomy, actively on chemotherapy, last dose of etoposide/cisplatin on 6/2024) presenting with worsening shortness of breath. Patient was recently hospitalized at [facility] from January 27th 2025 to February 6th 2025 for seizure and influenza virus infection, which required intubation. He was sent to rehab (originally from home) from hospital. He returns due to sudden onset shortness of breath and worsening oxygenation. Of note, he tested positive for COVID-19       1- SHAGUFTA on CKDIII  2- covid-19  3- anemia  4- hypotension  bp is better   5- respiratory failure         SHAGUFTA suspected in setting of new infection. covid 19   cr and bun improving   trend cr and lytes   diurese prn   hyperphosphatemia   renvela 1600 mg tid feeding tube if able   anemia, trend hb  cont O2 d/w ICU team when seen earlier

## 2025-03-07 NOTE — PROGRESS NOTE ADULT - ASSESSMENT
Mr. Gr is a 69 year old male with PMHx of seizure disorder, sleep apnea, HTN, chronic idiopathic constipation, stage III CKD, severe obesity, secondary hyperparathyroidism, anemia, thrombocytopenia, hyperlipidemia, testicular cancer under treatment with Dr. Ovalles who is admitted for acute hypoxic respiratory failure secondary to COVID vs superimposed pneumonia with new onset thrombocytopenia. He was recently discharged 02/06/2025 after a tenous stay including intubation in the ICU for respiratory failure in setting of seizure. He had recovered and was discharged to rehab.      Former smoker, quit 14y prior, denied ETOH, drug use. Lives at home. Does not have children. Denies family history of blood disorders or malignancy.  Denies previous workplace related exposures.  Denies previous history of blood transfusions.  Denied history of thromboses.  Denied history of  requiring anticoagulation.     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  Margins were negative.  pT3Nx. CT C/A/P with few left para-aortic and left iliac chain lymph nodes largest measuring 8.1 cm left para-aortic node, bilateral subcentimeter noncalcified pulmonary nodules measuring up to 7 mm. AFP, LDH, hCG were all elevated. 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 Started on adjuvant therapy with Cisplatin+Etoposide, so far completed 4 cycles of treatment with cisplatin dose reduced 50% and Etoposide 50% due to thrombocytopenia.      02/19/2025: on HFNC, noted tachycardia and fever, Klebsiella in urine as well, thrombocytopenia stable/improving, no signs of active bleeding     02/20/2025: developed A.fib with RVR and was placed on heparin drip and pending cardiology eval    02/21/2025: awake, on AVAPS overnight, noted RRT for hypoxia as pt removed HFNC at some point in time, good response thereafter     02/22/2025:  continues on AVAPS this morning, noted RRT overnight for hypoxia, hypercapnia, ICU following, US LE negative for DVT, counts overall stable/improved     02/23/2025: progressive respiratory failure, noted ongoing RTT this morning with pending intubation and transfer to MICU     02/24/2025: intubated 02/23/2025, sedated, on pressor support, no signs of bleeding, counts noted, no signs of bleeding     02/25/2025: continues in MICU, intubated and sedated, no signs of bleeding    02/26/2025: continues in MICU, intubated, without signs of bleeding, continues on heparin drip     02/27/2025:  remains under the care of the ICU, intubated, no signs of bleeding and continues on heparin drip, counts stable, has not required PRBC support this admission    02/28/2025: continues under the care of MICU, continues intubated, no signs of bleeding, on heparin drip    03/01/2025: continues in MICU, intubated, direct josefina IgG positive, eluate negative, not likely to be clinically significant     03/02/2025:  continues in MICU, nursing staff at bedside, no overnight events noted. CTH without acute intracranial pathology     03/03/2025: continues in MICU, intubated, on heparin drip, 1u PRBC overnight, no signs of bleeding, platelet count stable     03/04/2025: awake, moving arms spontaneously on exam, continues without much interaction however. No signs of bleeding, continues heparin drip, continues intubated in MICU     03/05/2025: extubated 03/04/2025, continues in MICU, awake and alert this morning and able to speak full sentences, discussed/reviewed findings, continues on heparin drip     03/06/2025: nursing staff at bedside, bipap overnight, without signs of bleeding, counts noted stable, renal function improving       #Acute hypoxic respiratory failure  # COVID  - CT noted with bilateral GGO  - ID following  - Pulmonary following  - Antibiotics per primary team/MICU and ID   - Intubated 02/23/2025 AM, MICU   - extubated 03/04/2025    # Thrombocytopenia   - Did occur during most recent admission and improved to normal prior to discharge   - Without signs of bleeding  - Could be multifactorial, possibly due to infection   - Continue to trend  - Transfuse to maintain Plt > 10k unless actively bleeding then maintain > 50k     # Brain lesion  - pt with hx of seizures  - MRI brain now with 5.4 mm enhancing lesion in the LEFT middle cerebellar peduncle with associated edema suspicious for metastases  - MRI Lumbar negative for acute disease  - Small lesion without mass effect or edema, recommended to correlate per neurology if foci and locus are concordant with seizure activity  - Neurology recs noted, new lesion not likely to cause seizure  - It is rare, but nonetheless not impossible, for testicular cancer to be metastatic to the brain  - He will need another PET-CT, can be completed outpatient once stable if concern can proceed with biopsy at that time   - Previous endocrinology eval for thyroid nodule noted  - AFP/BHCH normal,  previously   - Repeat CTH without acute intracranial pathology       # 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 Ovalles, agree with current plan  - Follow up as outpatient with Franki Ovalles MD     # Acute kidney injury on CKD Stage IIIA  - Likely multifactorial  - Nephrology consult and recs noted  - Continue to trend     # Normocytic anemia  - Chronic, has hx of PRBC during chemo 07/2024  - Noted Anti E, Anti-K Anti-C antibodies previously  - direct josefina IgG positive, eluate negative, not likely to be clinically significant   - s/p 1u PRBC 03/03/2025   - Monitor for bleeding  - Recommend to transfuse to maintain Hb > 7    # Klebsiella UTI  -Antibiotics per ID and primary team       Recommendations  - Trend CBC daily   - Transfuse to maintain Hb > 7   - Transfuse to maintain Plt >10k unless active bleeding then >50k   - Monitor renal function  - Cardiology follow up to address anticoagulation, currently on heparin drip   - f/u ongoing ICU recs         Thank you for allowing me to participate in the care of Mr. Gr please do not hesitate to call or text me if you have further questions or concerns.     Brayan Mart MD  [facility]   Division of Hematology/Oncology  [address], [address]  [place] [address]  P: [phone]  F: [phone]    Attestation:    ----Pt evaluated including face-to-face interaction in addition to chart review, reviewing treatment plan, and managing the patient’s chronic diagnoses as listed in the assessment----        Mr. Gr is a 69 year old male with PMHx of seizure disorder, sleep apnea, HTN, chronic idiopathic constipation, stage III CKD, severe obesity, secondary hyperparathyroidism, anemia, thrombocytopenia, hyperlipidemia, testicular cancer under treatment with Dr. Ovalles who is admitted for acute hypoxic respiratory failure secondary to COVID vs superimposed pneumonia with new onset thrombocytopenia. He was recently discharged 02/06/2025 after a tenous stay including intubation in the ICU for respiratory failure in setting of seizure. He had recovered and was discharged to rehab.      Former smoker, quit 14y prior, denied ETOH, drug use. Lives at home. Does not have children. Denies family history of blood disorders or malignancy.  Denies previous workplace related exposures.  Denies previous history of blood transfusions.  Denied history of thromboses.  Denied history of  requiring anticoagulation.     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  Margins were negative.  pT3Nx. CT C/A/P with few left para-aortic and left iliac chain lymph nodes largest measuring 8.1 cm left para-aortic node, bilateral subcentimeter noncalcified pulmonary nodules measuring up to 7 mm. AFP, LDH, hCG were all elevated. 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 Started on adjuvant therapy with Cisplatin+Etoposide, so far completed 4 cycles of treatment with cisplatin dose reduced 50% and Etoposide 50% due to thrombocytopenia.      02/19/2025: on HFNC, noted tachycardia and fever, Klebsiella in urine as well, thrombocytopenia stable/improving, no signs of active bleeding     02/20/2025: developed A.fib with RVR and was placed on heparin drip and pending cardiology eval    02/21/2025: awake, on AVAPS overnight, noted RRT for hypoxia as pt removed HFNC at some point in time, good response thereafter     02/22/2025:  continues on AVAPS this morning, noted RRT overnight for hypoxia, hypercapnia, ICU following, US LE negative for DVT, counts overall stable/improved     02/23/2025: progressive respiratory failure, noted ongoing RTT this morning with pending intubation and transfer to MICU     02/24/2025: intubated 02/23/2025, sedated, on pressor support, no signs of bleeding, counts noted, no signs of bleeding     02/25/2025: continues in MICU, intubated and sedated, no signs of bleeding    02/26/2025: continues in MICU, intubated, without signs of bleeding, continues on heparin drip     02/27/2025:  remains under the care of the ICU, intubated, no signs of bleeding and continues on heparin drip, counts stable, has not required PRBC support this admission    02/28/2025: continues under the care of MICU, continues intubated, no signs of bleeding, on heparin drip    03/01/2025: continues in MICU, intubated, direct josefina IgG positive, eluate negative, not likely to be clinically significant     03/02/2025:  continues in MICU, nursing staff at bedside, no overnight events noted. CTH without acute intracranial pathology     03/03/2025: continues in MICU, intubated, on heparin drip, 1u PRBC overnight, no signs of bleeding, platelet count stable     03/04/2025: awake, moving arms spontaneously on exam, continues without much interaction however. No signs of bleeding, continues heparin drip, continues intubated in MICU     03/05/2025: extubated 03/04/2025, continues in MICU, awake and alert this morning and able to speak full sentences, discussed/reviewed findings, continues on heparin drip     03/06/2025: nursing staff at bedside, bipap overnight, without signs of bleeding, counts noted stable, renal function improving     03/07/2025:  no signs of bleeding, counts noted, continues heparin drip, continues in MICU      #Acute hypoxic respiratory failure  # COVID  - CT noted with bilateral GGO  - ID following  - Pulmonary following  - Antibiotics per primary team/MICU and ID   - Intubated 02/23/2025 AM, MICU   - extubated 03/04/2025    # Thrombocytopenia   - Did occur during most recent admission and improved to normal prior to discharge   - Without signs of bleeding  - Could be multifactorial, possibly due to infection   - Continue to trend  - Transfuse to maintain Plt > 10k unless actively bleeding then maintain > 50k     # Brain lesion  - pt with hx of seizures  - MRI brain now with 5.4 mm enhancing lesion in the LEFT middle cerebellar peduncle with associated edema suspicious for metastases  - MRI Lumbar negative for acute disease  - Small lesion without mass effect or edema, recommended to correlate per neurology if foci and locus are concordant with seizure activity  - Neurology recs noted, new lesion not likely to cause seizure  - It is rare, but nonetheless not impossible, for testicular cancer to be metastatic to the brain  - He will need another PET-CT, can be completed outpatient once stable if concern can proceed with biopsy at that time   - Previous endocrinology eval for thyroid nodule noted  - AFP/BHCH normal,  previously   - Repeat CTH without acute intracranial pathology       # 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 Ovalles, agree with current plan  - Follow up as outpatient with Franki Ovalles MD     # Acute kidney injury on CKD Stage IIIA  - Likely multifactorial  - Nephrology consult and recs noted  - Continue to trend     # Normocytic anemia  - Chronic, has hx of PRBC during chemo 07/2024  - Noted Anti E, Anti-K Anti-C antibodies previously  - direct josefina IgG positive, eluate negative, not likely to be clinically significant   - s/p 1u PRBC 03/03/2025   - Monitor for bleeding  - Recommend to transfuse to maintain Hb > 7    # Klebsiella UTI  -Antibiotics per ID and primary team       Recommendations  - Trend CBC daily   - Transfuse to maintain Hb > 7   - Transfuse to maintain Plt >10k unless active bleeding then >50k   - Monitor renal function  - Cardiology follow up to address anticoagulation, currently on heparin drip   - f/u ongoing ICU recs         Thank you for allowing me to participate in the care of Mr. Gr please do not hesitate to call or text me if you have further questions or concerns.     Brayan Mart MD  [facility]   Division of Hematology/Oncology  [address] [place] [address], [address]  [place] [address]  P: [phone]  F: [phone]    Attestation:    ----Pt evaluated including face-to-face interaction in addition to chart review, reviewing treatment plan, and managing the patient’s chronic diagnoses as listed in the assessment----

## 2025-03-07 NOTE — PROGRESS NOTE ADULT - PROBLEM SELECTOR PLAN 1
-Recent admission for acute respiratory failure in the setting of grand mal seizures, influenza, PNA. S/p intubation in MICU, was extubated to AVAPS. Now COVID +  -CT chest with b/l GGO, new since 1/30/25. Likely COVID PNA +/- superimposed bacterial PNA.   -Increase in O2 requirements - started on HFNC 2/18  -S/p multiple RRTs for hypoxia, increased WOB in the setting of COVID, PNA, fluid overload. Intubated s/p RRT on 2/23 & transferred to MICU  -Extubated 3/4   -Continue bronchodilators  -Completed ABX   -Still with difficulty managing secretions, GOC discussions ongoing   -Keep O>I as tolerated, s/p Bumex drip   -Keep sats >90% with O2 PRN.

## 2025-03-07 NOTE — PROGRESS NOTE ADULT - ASSESSMENT
69 year old male with a past medical history of hypertension, hyperlipemia VAZQUEZ (on CPAP at bedtime, NC 2 liters during the day), CKD stage 3, epilepsy, schizophrenia, developmental delay, metastatic testicular cancer (s/p orchiectomy, actively on chemotherapy, last dose of etoposide/cisplatin on 6/2024), presenting with acute on chronic hypoxemic respiratory failure, secondary to (a) COVID-19 infection, (b) superimposed pneumonia after recent influenza infection, and/or (c) fluid overload.     MICU consulted and accepted after RRT due to increased work of breathing    NEURO:  #Mental status:  - Off precedex  - Back to baseline mental status    #Epilepsy:  - Continue with home meds which include Lacosamide, Lurasidone, Lamotrigine - increased to 50 BID  - EEG negative    #Brain Mets:  - MRI brain now with 5.4 mm enhancing lesion in the LEFT middle cerebellar peduncle with associated edema suspicious for metastases    # Schizophrenia  - Lurasidone halved to reduce oversedation    CV:  #Echo:  Recent echo last admission showing Left ventricular cavity is normal in size. Left ventricular systolic function is normal with an ejection fraction of 62 %. There are no regional wall motion abnormalities seen. Normal right ventricular cavity size and normal right ventricular systolic function.   - Repeat echo continuing to show EF 70%    #Afib w/RVR  - New onset Afib RVR (on tele, confirmed with EKG 2/19)   - Plan: Started metoprolol 5mg IVP q6 hours & Hep gtt -> metoprolol restarted  - If rate remains uncontrolled, can start Cardizem drip at 5mg/h  - c/w Heparin drip    #HTN:  - On Hydralazine 25mg TID, Amlodipine 5mg, held iso current hypotension    PULM:  #Vent   - Extubated 3/4    #AHRF  - Patient admitted to the hospital for AHRF, found to be COVID (+)  - Likely 2/2 PNA, does not appear to CHF decompensation  - Continue with airway clearance regimen - duoneb, hypertonic saline, chest PT  - CT chest with b/l GGOs, possible early COVID fibrosis, area of consolidation ?infectious vs. atelectasis    RENAL:  #SHAGUFTA:   - Hx of CKD stage 3, Cr 2.38 at baseline, seems to be plateauing at 4.24  - In setting of COVID (+)  - Pre-renal, FeUrea 29%  - Monitor Cr  - Avoid nephrotoxic meds  - Wakefield in place  - Fluid challenge - albumin and blood given with improvement in renal function, will continue on 50 cc LR    GI:  No acute issues     #Diet: Tube feeds via OG tube  #Bowel Regimen  - On Senna and Miralax    ENDO:  No acute issues  Thyroid nodule noted in prior notes    HEMATOLOGIC:  #Thrombocytopenic  - Unclear origin  - Concern for possible mets to bone marrow? vs due to infection  - Continue to monitor  - Transfuse to maintain Plt>10K unless actively bleeding then maintain >50K  - Heme-Onc recs appreciated    #Anemia  - CKD vs. cancer vs. sepsis  - 1 unit PRBCs given  - CTM CBC  - No sign of bleeding    #DVT prophylaxis   - SCD    ID:  #COVID-19  - Concern for possible superimposed PNA   - S/p zosyn 2/16- 2/28  - S/p Remdesivir  - Blood cx negative    SKIN:  #Lines:  2x PIV    Ethics:  - Full Code  - Contact: Sister Ester [phone]  69 year old male with a past medical history of hypertension, hyperlipemia VAZQUEZ (on CPAP at bedtime, NC 2 liters during the day), CKD stage 3, epilepsy, schizophrenia, developmental delay, metastatic testicular cancer (s/p orchiectomy, actively on chemotherapy, last dose of etoposide/cisplatin on 6/2024), presenting with acute on chronic hypoxemic respiratory failure, secondary to (a) COVID-19 infection, (b) superimposed pneumonia after recent influenza infection, and/or (c) fluid overload.     MICU consulted and accepted after RRT due to increased work of breathing    NEURO:  #Mental status:  -Off precedex  -Back to baseline mental status    #Epilepsy:  - Continue with home meds which include Lacosamide, Lurasidone, Lamotrigine - increased to 50 BID  - EEG negative    #Brain Mets:  - MRI brain now with 5.4 mm enhancing lesion in the LEFT middle cerebellar peduncle with associated edema suspicious for metastases    # Schizophrenia  - Lurasidone halved to reduce oversedation    CV:  #Echo:  Recent echo last admission showing Left ventricular cavity is normal in size. Left ventricular systolic function is normal with an ejection fraction of 62 %. There are no regional wall motion abnormalities seen. Normal right ventricular cavity size and normal right ventricular systolic function.   - Repeat echo continuing to show EF 70%    #Afib w/RVR  - New onset Afib RVR (on tele, confirmed with EKG 2/19)   - Plan: Started metoprolol 5mg IVP q6 hours & Hep gtt -> metoprolol restarted  - If rate remains uncontrolled, can start Cardizem drip at 5mg/h  - c/w Heparin drip    #HTN:  - On Hydralazine 25mg TID, Amlodipine 5mg  -amlodipine 5 restarted 3/7 given hypertension    PULM:  #Vent   - Extubated 3/4    #AHRF  - Patient admitted to the hospital for AHRF, found to be COVID (+)  - Likely 2/2 PNA, does not appear to CHF decompensation  - Continue with airway clearance regimen - duoneb, hypertonic saline, chest PT  - CT chest with b/l GGOs, possible early COVID fibrosis, area of consolidation ?infectious vs. atelectasis  -still with significant secretion burden, may require trach if no improvement    RENAL:  #SHAGUFTA: - Resolving  - Hx of CKD stage 3, Cr 2.38 at baseline, peak at 4.24  - In setting of COVID (+)  - Pre-renal, FeUrea 29%  - Monitor Cr  - Avoid nephrotoxic meds  - Wakefield in place  -appears to be near baseline at 3/7    GI:  No acute issues     #Diet: Tube feeds via OG tube  #Bowel Regimen  - On Senna and Miralax    ENDO:  No acute issues  Thyroid nodule noted in prior notes    HEMATOLOGIC:  #Thrombocytopenic  - Unclear origin  - Concern for possible mets to bone marrow? vs due to infection  - Continue to monitor  - Transfuse to maintain Plt>10K unless actively bleeding then maintain >50K  - Heme-Onc recs appreciated    #Anemia  - CKD vs. cancer vs. sepsis  - 1 unit PRBCs given  - CTM CBC  - No sign of bleeding    #DVT prophylaxis   - SCD    ID:  #COVID-19  - Concern for possible superimposed PNA   - S/p zosyn 2/16- 2/28  - S/p Remdesivir  - Blood cx negative    SKIN:  #Lines:  2x PIV    Ethics:  - Full Code  - Contact: Sister Ester [phone]

## 2025-03-07 NOTE — PROGRESS NOTE ADULT - SUBJECTIVE AND OBJECTIVE BOX
[facility] INPATIENT PROGRESS NOTE     Interval Hx:   03-07-25: Mr. Gr was seen at bedside today.    Meds:   MEDICATIONS  (STANDING):  albuterol/ipratropium for Nebulization 3 milliLiter(s) Nebulizer every 6 hours  amLODIPine   Tablet 5 milliGRAM(s) Oral daily  atorvastatin 20 milliGRAM(s) Oral at bedtime  cholecalciferol 1000 Unit(s) Oral daily  erythromycin   Ointment 1 Application(s) Both EYES four times a day  escitalopram 15 milliGRAM(s) Oral with breakfast  gabapentin Solution 150 milliGRAM(s) Oral every 12 hours  heparin  Infusion. 1700 Unit(s)/Hr (17 mL/Hr) IV Continuous <Continuous>  influenza  Vaccine (HIGH DOSE) 0.5 milliLiter(s) IntraMuscular once  lacosamide IVPB 200 milliGRAM(s) IV Intermittent every 12 hours  lactated ringers. 1000 milliLiter(s) (50 mL/Hr) IV Continuous <Continuous>  lamoTRIgine 50 milliGRAM(s) Oral two times a day  lanolin Ointment 1 Application(s) Topical daily  levETIRAcetam   Injectable 750 milliGRAM(s) IV Push every 12 hours  lurasidone 20 milliGRAM(s) Oral at bedtime  metoprolol tartrate 12.5 milliGRAM(s) Oral two times a day  nystatin Powder 1 Application(s) Topical two times a day  ofloxacin 0.3% Solution 1 Drop(s) Right EYE every 4 hours  pantoprazole  Injectable 40 milliGRAM(s) IV Push daily  polyethylene glycol 3350 17 Gram(s) Oral daily  senna Syrup 10 milliLiter(s) Oral at bedtime  sevelamer carbonate Powder 1600 milliGRAM(s) Oral every 8 hours  sodium chloride 3%  Inhalation 4 milliLiter(s) Inhalation every 6 hours    MEDICATIONS  (PRN):  artificial tears (preservative free) Ophthalmic Solution 1 Drop(s) Both EYES every 6 hours PRN Dry Eyes    Vital Signs Last 24 Hrs  T(C): 37.2 (07 Mar 2025 00:00), Max: 37.7 (06 Mar 2025 08:00)  T(F): 98.9 (07 Mar 2025 00:00), Max: 99.9 (06 Mar 2025 08:00)  HR: 102 (07 Mar 2025 03:40) (87 - 113)  BP: 122/60 (07 Mar 2025 01:00) (106/54 - 191/90)  BP(mean): 85 (07 Mar 2025 01:00) (76 - 132)  RR: 25 (07 Mar 2025 01:00) (20 - 54)  SpO2: 100% (07 Mar 2025 03:40) (90% - 100%)    Parameters below as of 06 Mar 2025 23:21  Patient On (Oxygen Delivery Method): nasal cannula    Physical Exam:  Gen: NAD  HEENT: EOMI, MMM  Chest: equal chest rise  Cardiac: regular   Abd: non distended    Labs:                        8.4    14.83 )-----------( 174      ( 06 Mar 2025 23:42 )             27.7     CBC Full  -  ( 06 Mar 2025 23:42 )  WBC Count : 14.83 K/uL  RBC Count : 2.66 M/uL  Hemoglobin : 8.4 g/dL  Hematocrit : 27.7 %  Platelet Count - Automated : 174 K/uL  Mean Cell Volume : 104.1 fl  Mean Cell Hemoglobin : 31.6 pg  Mean Cell Hemoglobin Concentration : 30.3 g/dL  03-06    146[H]  |  109[H]  |  85[H]  ----------------------------<  127[H]  4.6   |  25  |  2.52[H]    Ca    9.9      06 Mar 2025 23:42  Phos  4.0     03-06  Mg     2.7     03-06    TPro  7.0  /  Alb  3.0[L]  /  TBili  0.4  /  DBili  x   /  AST  29  /  ALT  31  /  AlkPhos  149[H]  03-06    PT/INR - ( 06 Mar 2025 23:42 )   PT: 13.1 sec;   INR: 1.15 ratio         PTT - ( 06 Mar 2025 23:42 )  PTT:62.7 sec  Bilirubin Total: 0.4 mg/dL (03-06-25 @ 23:42)   [facility] INPATIENT PROGRESS NOTE     Interval Hx:   03-07-25: Mr. Gr was seen at bedside today, awake and alert, no overnight events noted, no signs of bleeding, counts noted, continues heparin drip, continues in MICU    Meds:   MEDICATIONS  (STANDING):  albuterol/ipratropium for Nebulization 3 milliLiter(s) Nebulizer every 6 hours  amLODIPine   Tablet 5 milliGRAM(s) Oral daily  atorvastatin 20 milliGRAM(s) Oral at bedtime  cholecalciferol 1000 Unit(s) Oral daily  erythromycin   Ointment 1 Application(s) Both EYES four times a day  escitalopram 15 milliGRAM(s) Oral with breakfast  gabapentin Solution 150 milliGRAM(s) Oral every 12 hours  heparin  Infusion. 1700 Unit(s)/Hr (17 mL/Hr) IV Continuous <Continuous>  influenza  Vaccine (HIGH DOSE) 0.5 milliLiter(s) IntraMuscular once  lacosamide IVPB 200 milliGRAM(s) IV Intermittent every 12 hours  lactated ringers. 1000 milliLiter(s) (50 mL/Hr) IV Continuous <Continuous>  lamoTRIgine 50 milliGRAM(s) Oral two times a day  lanolin Ointment 1 Application(s) Topical daily  levETIRAcetam   Injectable 750 milliGRAM(s) IV Push every 12 hours  lurasidone 20 milliGRAM(s) Oral at bedtime  metoprolol tartrate 12.5 milliGRAM(s) Oral two times a day  nystatin Powder 1 Application(s) Topical two times a day  ofloxacin 0.3% Solution 1 Drop(s) Right EYE every 4 hours  pantoprazole  Injectable 40 milliGRAM(s) IV Push daily  polyethylene glycol 3350 17 Gram(s) Oral daily  senna Syrup 10 milliLiter(s) Oral at bedtime  sevelamer carbonate Powder 1600 milliGRAM(s) Oral every 8 hours  sodium chloride 3%  Inhalation 4 milliLiter(s) Inhalation every 6 hours    MEDICATIONS  (PRN):  artificial tears (preservative free) Ophthalmic Solution 1 Drop(s) Both EYES every 6 hours PRN Dry Eyes    Vital Signs Last 24 Hrs  T(C): 37.2 (07 Mar 2025 00:00), Max: 37.7 (06 Mar 2025 08:00)  T(F): 98.9 (07 Mar 2025 00:00), Max: 99.9 (06 Mar 2025 08:00)  HR: 102 (07 Mar 2025 03:40) (87 - 113)  BP: 122/60 (07 Mar 2025 01:00) (106/54 - 191/90)  BP(mean): 85 (07 Mar 2025 01:00) (76 - 132)  RR: 25 (07 Mar 2025 01:00) (20 - 54)  SpO2: 100% (07 Mar 2025 03:40) (90% - 100%)    Parameters below as of 06 Mar 2025 23:21  Patient On (Oxygen Delivery Method): nasal cannula    Physical Exam:  Gen: NAD  HEENT: EOMI, MMM  Chest: equal chest rise  Cardiac: regular   Abd: non distended    Labs:                        8.4    14.83 )-----------( 174      ( 06 Mar 2025 23:42 )             27.7     CBC Full  -  ( 06 Mar 2025 23:42 )  WBC Count : 14.83 K/uL  RBC Count : 2.66 M/uL  Hemoglobin : 8.4 g/dL  Hematocrit : 27.7 %  Platelet Count - Automated : 174 K/uL  Mean Cell Volume : 104.1 fl  Mean Cell Hemoglobin : 31.6 pg  Mean Cell Hemoglobin Concentration : 30.3 g/dL  03-06    146[H]  |  109[H]  |  85[H]  ----------------------------<  127[H]  4.6   |  25  |  2.52[H]    Ca    9.9      06 Mar 2025 23:42  Phos  4.0     03-06  Mg     2.7     03-06    TPro  7.0  /  Alb  3.0[L]  /  TBili  0.4  /  DBili  x   /  AST  29  /  ALT  31  /  AlkPhos  149[H]  03-06    PT/INR - ( 06 Mar 2025 23:42 )   PT: 13.1 sec;   INR: 1.15 ratio         PTT - ( 06 Mar 2025 23:42 )  PTT:62.7 sec  Bilirubin Total: 0.4 mg/dL (03-06-25 @ 23:42)

## 2025-03-07 NOTE — PROGRESS NOTE ADULT - ATTENDING COMMENTS
1. Acute  hypoxemic  respiratory failure in setting of Covid with new superimposed  pneumonia. Likely aspiration pneumonia.. Pt finished course of treatment  for covid and pneumonia. Pt tolerating extubation but still requires frequent suctioning  of secretions. Tolerating nasal cannula.    2. Renal. Acute renal failure , Pre renal . Over diuresis.  Good UO and decrease creatinine. with  LR at 50 cc/hr  for 24 hrs. Continue  fo additional 24 hrs.    3. Neuro. starting to wake up. Opens eyes to name. Decrease Precedex as tolerated. EEG negative. Stop EEG.'  Continue current AEDS regimen. Pt alert and following commands.    4.Cardiac. Chronic atrial fib. Continue  IV heparin . Continue metoprolol  12.5 bid.    5..DVT prophylaxis: IV heparin    6. GOC; Full code. Discussion yesterday with family. They would want pt to be reintubated and with tracheostomy. PT still requiring q2 hr suction. If pt  still requiring q 2 hrs suction over the weekend, we will electively intubate  next week for tracheostomy.    7. Psych. Schizophrenia: Continue  current psych  regimen

## 2025-03-07 NOTE — PROGRESS NOTE ADULT - SUBJECTIVE AND OBJECTIVE BOX
INCOMPLETE    INTERVAL HPI/OVERNIGHT EVENTS:    SUBJECTIVE: Patient seen and examined at bedside. ROS could not be obtained as patient intubated, sedated.      VITAL SIGNS:  ICU Vital Signs Last 24 Hrs  T(C): 37.2 (07 Mar 2025 04:00), Max: 37.7 (06 Mar 2025 08:00)  T(F): 99 (07 Mar 2025 04:00), Max: 99.9 (06 Mar 2025 08:00)  HR: 86 (07 Mar 2025 07:00) (86 - 113)  BP: 163/73 (07 Mar 2025 07:00) (106/54 - 191/90)  BP(mean): 105 (07 Mar 2025 07:00) (76 - 132)  ABP: --  ABP(mean): --  RR: 28 (07 Mar 2025 07:00) (20 - 54)  SpO2: 100% (07 Mar 2025 07:00) (90% - 100%)    O2 Parameters below as of 07 Mar 2025 05:12  Patient On (Oxygen Delivery Method): nasal cannula            Plateau pressure:   P/F ratio:     03-06 @ 07:01  -  03-07 @ 07:00  --------------------------------------------------------  IN: 2305 mL / OUT: 700 mL / NET: 1605 mL      CAPILLARY BLOOD GLUCOSE        I&O's Summary    06 Mar 2025 07:01  -  07 Mar 2025 07:00  --------------------------------------------------------  IN: 2305 mL / OUT: 700 mL / NET: 1605 mL          PHYSICAL EXAM:    General: NAD intubated  HEENT: PERRLA, EOMI, non-icteric  Neck:  symmetric,  JVD absent  Respiratory: Clear to ascultation bilaterally, no crackles/rales, no Resp distress; no accessory muscle use  Cardiovascular:  RRR, no murmurs/rubs/gallops  Abdomen: Soft, NT, ND  Extremities: No edema noted  Skin: No rashes or lesions noted  Neurological: RASS _  Psychiatry: AOx    MEDICATIONS:  MEDICATIONS  (STANDING):  albuterol/ipratropium for Nebulization 3 milliLiter(s) Nebulizer every 6 hours  amLODIPine   Tablet 5 milliGRAM(s) Oral daily  atorvastatin 20 milliGRAM(s) Oral at bedtime  cholecalciferol 1000 Unit(s) Oral daily  erythromycin   Ointment 1 Application(s) Both EYES four times a day  escitalopram 15 milliGRAM(s) Oral with breakfast  gabapentin Solution 150 milliGRAM(s) Oral every 12 hours  heparin  Infusion. 1700 Unit(s)/Hr (17 mL/Hr) IV Continuous <Continuous>  influenza  Vaccine (HIGH DOSE) 0.5 milliLiter(s) IntraMuscular once  lacosamide IVPB 200 milliGRAM(s) IV Intermittent every 12 hours  lactated ringers. 1000 milliLiter(s) (50 mL/Hr) IV Continuous <Continuous>  lamoTRIgine 50 milliGRAM(s) Oral two times a day  lanolin Ointment 1 Application(s) Topical daily  levETIRAcetam   Injectable 750 milliGRAM(s) IV Push every 12 hours  lurasidone 20 milliGRAM(s) Oral at bedtime  metoprolol tartrate 12.5 milliGRAM(s) Oral two times a day  nystatin Powder 1 Application(s) Topical two times a day  ofloxacin 0.3% Solution 1 Drop(s) Right EYE every 4 hours  pantoprazole  Injectable 40 milliGRAM(s) IV Push daily  polyethylene glycol 3350 17 Gram(s) Oral daily  senna Syrup 10 milliLiter(s) Oral at bedtime  sevelamer carbonate Powder 1600 milliGRAM(s) Oral every 8 hours  sodium chloride 3%  Inhalation 4 milliLiter(s) Inhalation every 6 hours    MEDICATIONS  (PRN):  artificial tears (preservative free) Ophthalmic Solution 1 Drop(s) Both EYES every 6 hours PRN Dry Eyes      ALLERGIES:  Allergies    seasonal allergies (Unknown)  penicillin (Hives)    Intolerances    latex (Rash)      LABS:                        8.4    14.83 )-----------( 174      ( 06 Mar 2025 23:42 )             27.7     03-06    146[H]  |  109[H]  |  85[H]  ----------------------------<  127[H]  4.6   |  25  |  2.52[H]    Ca    9.9      06 Mar 2025 23:42  Phos  4.0     03-06  Mg     2.7     03-06    TPro  7.0  /  Alb  3.0[L]  /  TBili  0.4  /  DBili  x   /  AST  29  /  ALT  31  /  AlkPhos  149[H]  03-06    PT/INR - ( 06 Mar 2025 23:42 )   PT: 13.1 sec;   INR: 1.15 ratio         PTT - ( 06 Mar 2025 23:42 )  PTT:62.7 sec  ABG - ( 06 Mar 2025 00:02 )  pH, Arterial: 7.40  pH, Blood: x     /  pCO2: 44    /  pO2: 106   / HCO3: 27    / Base Excess: 2.2   /  SaO2: 98.4              Urinalysis Basic - ( 06 Mar 2025 23:42 )    Color: x / Appearance: x / SG: x / pH: x  Gluc: 127 mg/dL / Ketone: x  / Bili: x / Urobili: x   Blood: x / Protein: x / Nitrite: x   Leuk Esterase: x / RBC: x / WBC x   Sq Epi: x / Non Sq Epi: x / Bacteria: x          RADIOLOGY & ADDITIONAL TESTS: Reviewed. INTERVAL HPI/OVERNIGHT EVENTS: Last night discussed GOC and tracheostomy with family (see separate GOC note)    SUBJECTIVE: Patient seen and examined at bedside. Patient somnolent, unable to obtain ROS      VITAL SIGNS:  ICU Vital Signs Last 24 Hrs  T(C): 37.2 (07 Mar 2025 04:00), Max: 37.7 (06 Mar 2025 08:00)  T(F): 99 (07 Mar 2025 04:00), Max: 99.9 (06 Mar 2025 08:00)  HR: 86 (07 Mar 2025 07:00) (86 - 113)  BP: 163/73 (07 Mar 2025 07:00) (106/54 - 191/90)  BP(mean): 105 (07 Mar 2025 07:00) (76 - 132)  ABP: --  ABP(mean): --  RR: 28 (07 Mar 2025 07:00) (20 - 54)  SpO2: 100% (07 Mar 2025 07:00) (90% - 100%)    O2 Parameters below as of 07 Mar 2025 05:12  Patient On (Oxygen Delivery Method): nasal cannula            Plateau pressure:   P/F ratio:     03-06 @ 07:01  -  03-07 @ 07:00  --------------------------------------------------------  IN: 2305 mL / OUT: 700 mL / NET: 1605 mL      CAPILLARY BLOOD GLUCOSE        I&O's Summary    06 Mar 2025 07:01  -  07 Mar 2025 07:00  --------------------------------------------------------  IN: 2305 mL / OUT: 700 mL / NET: 1605 mL          PHYSICAL EXAM:    General: somnolent  HEENT: non-icteric  Neck:  symmetric,  JVD absent  Respiratory: Coarse breath sounds bilaterally on inspiration and expiration; no Resp distress; no accessory muscle use  Cardiovascular:  RRR  Abdomen: Soft, NT, ND  Extremities: No edema noted  Skin: No rashes or lesions noted  Neurological: RASS 0  Psychiatry: AOx1    MEDICATIONS:  MEDICATIONS  (STANDING):  albuterol/ipratropium for Nebulization 3 milliLiter(s) Nebulizer every 6 hours  amLODIPine   Tablet 5 milliGRAM(s) Oral daily  atorvastatin 20 milliGRAM(s) Oral at bedtime  cholecalciferol 1000 Unit(s) Oral daily  erythromycin   Ointment 1 Application(s) Both EYES four times a day  escitalopram 15 milliGRAM(s) Oral with breakfast  gabapentin Solution 150 milliGRAM(s) Oral every 12 hours  heparin  Infusion. 1700 Unit(s)/Hr (17 mL/Hr) IV Continuous <Continuous>  influenza  Vaccine (HIGH DOSE) 0.5 milliLiter(s) IntraMuscular once  lacosamide IVPB 200 milliGRAM(s) IV Intermittent every 12 hours  lactated ringers. 1000 milliLiter(s) (50 mL/Hr) IV Continuous <Continuous>  lamoTRIgine 50 milliGRAM(s) Oral two times a day  lanolin Ointment 1 Application(s) Topical daily  levETIRAcetam   Injectable 750 milliGRAM(s) IV Push every 12 hours  lurasidone 20 milliGRAM(s) Oral at bedtime  metoprolol tartrate 12.5 milliGRAM(s) Oral two times a day  nystatin Powder 1 Application(s) Topical two times a day  ofloxacin 0.3% Solution 1 Drop(s) Right EYE every 4 hours  pantoprazole  Injectable 40 milliGRAM(s) IV Push daily  polyethylene glycol 3350 17 Gram(s) Oral daily  senna Syrup 10 milliLiter(s) Oral at bedtime  sevelamer carbonate Powder 1600 milliGRAM(s) Oral every 8 hours  sodium chloride 3%  Inhalation 4 milliLiter(s) Inhalation every 6 hours    MEDICATIONS  (PRN):  artificial tears (preservative free) Ophthalmic Solution 1 Drop(s) Both EYES every 6 hours PRN Dry Eyes      ALLERGIES:  Allergies    seasonal allergies (Unknown)  penicillin (Hives)    Intolerances    latex (Rash)      LABS:                        8.4    14.83 )-----------( 174      ( 06 Mar 2025 23:42 )             27.7     03-06    146[H]  |  109[H]  |  85[H]  ----------------------------<  127[H]  4.6   |  25  |  2.52[H]    Ca    9.9      06 Mar 2025 23:42  Phos  4.0     03-06  Mg     2.7     03-06    TPro  7.0  /  Alb  3.0[L]  /  TBili  0.4  /  DBili  x   /  AST  29  /  ALT  31  /  AlkPhos  149[H]  03-06    PT/INR - ( 06 Mar 2025 23:42 )   PT: 13.1 sec;   INR: 1.15 ratio         PTT - ( 06 Mar 2025 23:42 )  PTT:62.7 sec  ABG - ( 06 Mar 2025 00:02 )  pH, Arterial: 7.40  pH, Blood: x     /  pCO2: 44    /  pO2: 106   / HCO3: 27    / Base Excess: 2.2   /  SaO2: 98.4              Urinalysis Basic - ( 06 Mar 2025 23:42 )    Color: x / Appearance: x / SG: x / pH: x  Gluc: 127 mg/dL / Ketone: x  / Bili: x / Urobili: x   Blood: x / Protein: x / Nitrite: x   Leuk Esterase: x / RBC: x / WBC x   Sq Epi: x / Non Sq Epi: x / Bacteria: x          RADIOLOGY & ADDITIONAL TESTS: Reviewed.

## 2025-03-07 NOTE — PROGRESS NOTE ADULT - SUBJECTIVE AND OBJECTIVE BOX
Date of Service: 03-07-25 @ 13:46    Patient is a 69y old  Male who presents with a chief complaint of Acute on chronic hypoxemic respiratory failure (07 Mar 2025 09:08)      Any change in ROS:   Awake & alert  O2 sats mid 90s on 6LNC     MEDICATIONS  (STANDING):  albuterol/ipratropium for Nebulization 3 milliLiter(s) Nebulizer every 6 hours  amLODIPine   Tablet 5 milliGRAM(s) Oral daily  atorvastatin 20 milliGRAM(s) Oral at bedtime  cholecalciferol 1000 Unit(s) Oral daily  erythromycin   Ointment 1 Application(s) Both EYES four times a day  escitalopram 15 milliGRAM(s) Oral with breakfast  gabapentin Solution 150 milliGRAM(s) Oral every 12 hours  heparin  Infusion. 1700 Unit(s)/Hr (17 mL/Hr) IV Continuous <Continuous>  influenza  Vaccine (HIGH DOSE) 0.5 milliLiter(s) IntraMuscular once  lacosamide IVPB 200 milliGRAM(s) IV Intermittent every 12 hours  lactated ringers. 1000 milliLiter(s) (50 mL/Hr) IV Continuous <Continuous>  lamoTRIgine 50 milliGRAM(s) Oral two times a day  lanolin Ointment 1 Application(s) Topical daily  levETIRAcetam   Injectable 750 milliGRAM(s) IV Push every 12 hours  lurasidone 20 milliGRAM(s) Oral at bedtime  metoprolol tartrate 12.5 milliGRAM(s) Oral two times a day  nystatin Powder 1 Application(s) Topical two times a day  ofloxacin 0.3% Solution 1 Drop(s) Right EYE every 4 hours  pantoprazole  Injectable 40 milliGRAM(s) IV Push daily  polyethylene glycol 3350 17 Gram(s) Oral daily  senna Syrup 10 milliLiter(s) Oral at bedtime  sevelamer carbonate Powder 1600 milliGRAM(s) Oral every 8 hours  sodium chloride 3%  Inhalation 4 milliLiter(s) Inhalation every 6 hours    MEDICATIONS  (PRN):  artificial tears (preservative free) Ophthalmic Solution 1 Drop(s) Both EYES every 6 hours PRN Dry Eyes    Vital Signs Last 24 Hrs  T(C): 36.4 (07 Mar 2025 13:00), Max: 37.2 (06 Mar 2025 20:00)  T(F): 97.6 (07 Mar 2025 13:00), Max: 99 (06 Mar 2025 20:00)  HR: 103 (07 Mar 2025 13:00) (86 - 113)  BP: 159/78 (07 Mar 2025 13:00) (106/54 - 191/90)  BP(mean): 111 (07 Mar 2025 13:00) (76 - 132)  RR: 20 (07 Mar 2025 13:00) (20 - 54)  SpO2: 96% (07 Mar 2025 13:00) (90% - 100%)    Parameters below as of 07 Mar 2025 12:00  Patient On (Oxygen Delivery Method): nasal cannula  O2 Flow (L/min): 6  O2 Concentration (%): 44    I&O's Summary    06 Mar 2025 07:01  -  07 Mar 2025 07:00  --------------------------------------------------------  IN: 2305 mL / OUT: 700 mL / NET: 1605 mL    07 Mar 2025 07:01  -  07 Mar 2025 13:46  --------------------------------------------------------  IN: 635 mL / OUT: 0 mL / NET: 635 mL          Physical Exam:   GENERAL: NAD, well-groomed, well-developed  HEENT: BREE/   Atraumatic, Normocephalic  ENMT: No tonsillar erythema, exudates, or enlargement; Moist mucous membranes, Good dentition, No lesions  NECK: Supple, No JVD, Normal thyroid  CHEST/LUNG: Coarse BS b/l   CVS: Regular rate and rhythm; No murmurs, rubs, or gallops  GI: : Soft, Nontender, Nondistended; Bowel sounds present  NERVOUS SYSTEM:  Alert & Oriented X2-3  EXTREMITIES:  2+ Peripheral Pulses, No clubbing, cyanosis, or edema  LYMPH: No lymphadenopathy noted  SKIN: No rashes or lesions  ENDOCRINOLOGY: No Thyromegaly  PSYCH: Appropriate    Labs:  ABG - ( 06 Mar 2025 00:02 )  pH, Arterial: 7.40  pH, Blood: x     /  pCO2: 44    /  pO2: 106   / HCO3: 27    / Base Excess: 2.2   /  SaO2: 98.4            30, 44, 40.0                            8.4    14.83 )-----------( 174      ( 06 Mar 2025 23:42 )             27.7                         8.0    9.52  )-----------( 167      ( 06 Mar 2025 00:18 )             26.1                         8.0    8.84  )-----------( 142      ( 05 Mar 2025 00:15 )             25.8                         7.3    8.48  )-----------( 109      ( 04 Mar 2025 00:40 )             22.8     03-06    146[H]  |  109[H]  |  85[H]  ----------------------------<  127[H]  4.6   |  25  |  2.52[H]  03-06    144  |  106  |  102[H]  ----------------------------<  102[H]  4.8   |  23  |  2.92[H]  03-05    140  |  100  |  124[H]  ----------------------------<  112[H]  4.2   |  24  |  3.31[H]  03-04    140  |  96  |  126[H]  ----------------------------<  146[H]  4.0   |  23  |  3.99[H]    Ca    9.9      06 Mar 2025 23:42  Ca    9.8      06 Mar 2025 00:11  Phos  4.0     03-06  Phos  5.6     03-06  Mg     2.7     03-06  Mg     2.9     03-06    TPro  7.0  /  Alb  3.0[L]  /  TBili  0.4  /  DBili  x   /  AST  29  /  ALT  31  /  AlkPhos  149[H]  03-06  TPro  7.2  /  Alb  3.1[L]  /  TBili  0.4  /  DBili  x   /  AST  29  /  ALT  30  /  AlkPhos  130[H]  03-06  TPro  7.0  /  Alb  3.1[L]  /  TBili  0.4  /  DBili  x   /  AST  34  /  ALT  32  /  AlkPhos  133[H]  03-05  TPro  6.8  /  Alb  2.9[L]  /  TBili  0.3  /  DBili  x   /  AST  34  /  ALT  32  /  AlkPhos  120  03-04    CAPILLARY BLOOD GLUCOSE      POCT Blood Glucose.: 123 mg/dL (07 Mar 2025 12:03)      LIVER FUNCTIONS - ( 06 Mar 2025 23:42 )  Alb: 3.0 g/dL / Pro: 7.0 g/dL / ALK PHOS: 149 U/L / ALT: 31 U/L / AST: 29 U/L / GGT: x           PT/INR - ( 06 Mar 2025 23:42 )   PT: 13.1 sec;   INR: 1.15 ratio         PTT - ( 06 Mar 2025 23:42 )  PTT:62.7 sec  Urinalysis Basic - ( 06 Mar 2025 23:42 )    Color: x / Appearance: x / SG: x / pH: x  Gluc: 127 mg/dL / Ketone: x  / Bili: x / Urobili: x   Blood: x / Protein: x / Nitrite: x   Leuk Esterase: x / RBC: x / WBC x   Sq Epi: x / Non Sq Epi: x / Bacteria: x            RECENT CULTURES:  03-05 @ 16:15 Eye Conjunctiva-Right       No polymorphonuclear cells seen  No organisms seen  by cytocentrifuge           No growth          Studies  < from: CT Chest No Cont (02.26.25 @ 22:32) >    ACC: [id number] EXAM:  CT CHEST   ORDERED BY:  ISABELLA RODRIGEZ     PROCEDURE DATE:  02/26/2025          INTERPRETATION:  CLINICAL INFORMATION: Hypoxia. COVID positive. History   of testicular cancer.    COMPARISON: CT chest 2/17/2025.    CONTRAST/COMPLICATIONS:  IV Contrast: NONE  Oral Contrast: NONE    PROCEDURE:  CT scan of the chest was obtained without intravenous contrast.    FINDINGS:    AIRWAYS/LUNGS/PLEURA: Evaluation is limited by motion artifact. Interval   intubation with the endotracheal tube terminating approximately 3 cm   above the guero. While the previously seen diffuse groundglass opacities   in the upper lobes have improved since 2/17/2025, there are new and   worsening areas of consolidation involving the left lower lobe, lingula   and right lower lobe in the interim. No pleural effusion.    MEDIASTINUM AND HA: Subcentimeter mediastinal nodes, likely reactive.    VESSELS: Right chest port terminating in the right atrium. 3.8 cm mid   ascending aorta.    HEART: The heart is enlarged. No pericardial effusion. Coronary artery   calcification.    VISUALIZED UPPER ABDOMEN: Enteric tube terminating in the distal stomach.   Left upper pole renal cyst.    BONES: Degenerative changes of the spine with partially visualized   thoracolumbar surgical hardware. Chronic healed bilateral rib and right   clavicular fractures.      IMPRESSION:  Since 2/17/2025, while the upper lobe groundglass opacities have   improved, there are new and worsening confluent areas of   consolidation/pneumonia in the mid to lower lungs.        --- End of Report ---        < end of copied text >

## 2025-03-07 NOTE — PROGRESS NOTE ADULT - ASSESSMENT
Patient is a 69 year old male with PMH of HTN, HLD, VAZQUEZ (on CPAP at bedtime, NC 2 liters during the day), CKD3, epilepsy, schizophrenia, developmental delay, metastatic testicular cancer (s/p orchiectomy, actively on chemotherapy, last dose of etoposide/cisplatin on 6/2024) presenting with worsening shortness of breath. Patient was recently hospitalized at [facility] from January 27th 2025 to February 6th 2025 for seizure and influenza virus infection, which required intubation. He was sent to rehab (originally from home) from hospital and now returns due to sudden onset shortness of breath and worsening oxygenation. Of note, he tested positive for COVID-19 at facility on 2/13/25 and was not put on any COVID-19 treatment at the time, only guaifenesin and scopolamine patch. Patient was placed on non-rebreather and sent to the hospital on 2/16/25. Noted initial discussion with patient/family and they decided to hold off on remdesivir given LFTs and SHAGUFTA.     Acute on chronic hypoxic respiratory failure  COVID-19 pneumonia, superimposed bacterial pneumonia  Acute on chronic HFpEF  Klebsiella UTI, treated   SHAGUFTA on CKD  Course c/b severe sepsis, hypotension, SHAGUFTA, likely due to aspiration pneumonia   - CT chest with extensive b/l ggo c/w COVID pneumonia; tracheomalacia   - MRSA PCR screen negative, s/p vancomycin   - 2/18 worsening resp status on NC requiring AVAPS, started on remdesivir   - 2/22 completed remdesivir x5d course   - 2/23 s/p RRT for inc WOB, s/p intubation, suspected aspiration   - 2/24 intubated, on sedation, pressor support, SHAGUFTA, WBC wnl   - 2/25 afebrile, off pressor, WBC wnl, Cr stable  - 2/26 CT chest with improved upper lobe ggo, new/worsening confluent areas of consolidation in mid-lower lungs   - 2/26 completed dexamethasone x10d  - 2/28 completed zosyn   - 3/4 s/p extubation, now on NC, copious secretions requiring frequent suctioning   - 3/5 started on erythromycin for conjunctivitis, culture NGTD   - 3/7 WBC noted, may be aspirating, remains afebrile     Recommendations:  Continue on erythromycin  Can trial acyclovir ointment to upper lip lesion  Continue off antibiotics other than above   Nephrology following, monitor Cr-improving   Monitor temps/WBC -- if any fevers or worsening, send cultures, obtain CXR and can start zosyn   Supportive care  Aspiration precautions  Continue rest of care per primary team       Greyson Mart M.D.  [facility]  Available on [employer] TEAMS - *PREFERRED*  [phone]  After 5pm on weekdays and all day on weekends - please call [phone]     Thank you for consulting us and involving us in the management of this patients case. In addition to reviewing history, imaging, documents, labs, microbiology, took into account antibiotic stewardship, local antibiogram and infection control strategies and potential transmission issues.

## 2025-03-07 NOTE — PROGRESS NOTE ADULT - ASSESSMENT
69 year old male with a past medical history of hypertension, hyperlipemia VAQZUEZ (on CPAP at bedtime, NC 2 liters during the day), CKD stage 3, epilepsy, schizophrenia, developmental delay, metastatic testicular cancer (s/p orchiectomy, actively on chemotherapy, last dose of etoposide/cisplatin on 6/2024), presenting with acute on chronic hypoxemic respiratory failure, secondary to (a) COVID-19 infection, (b) superimposed pneumonia after recent influenza infection, and/or (c) fluid overload.

## 2025-03-07 NOTE — PROGRESS NOTE ADULT - ASSESSMENT
68 y/o M with PMH of HTN, HLD, VAZQUEZ on CPAP and home O2 (2LNC daytime), CKD, epilepsy, schizophrenia developmental delay, metastatic testicular CA. Recent admission at [facility] for acute respiratory failure in the setting of seizures, influenza infection, pulmonary edema, PNA requiring intubation in MICU. Was eventually discharged to rehab. Presents now with SOB, found to be COVID + at facility on 2/13. Febrile on admission to ED to 101.9F. Placed on Bipap for increased WOB. Pulmonary called to consult for acute respiratory failure.

## 2025-03-07 NOTE — PROGRESS NOTE ADULT - NUTRITIONAL ASSESSMENT
This patient has been assessed with a concern for Malnutrition and has been determined to have a diagnosis/diagnoses of Severe protein-calorie malnutrition.    This patient is being managed with:   Diet NPO with Tube Feed-  Tube Feeding Modality: Nasogastric  Jevity 1.2 Garth (JEVITY1.2RTH)  Total Volume for 24 Hours (mL): 180  Continuous  Starting Tube Feed Rate {mL per Hour}: 10  Increase Tube Feed Rate by (mL): 0  Until Goal Tube Feed Rate (mL per Hour): 10  Tube Feed Duration (in Hours): 18  Tube Feed Start Time: 11:00  Entered: Mar  6 2025 10:10AM

## 2025-03-07 NOTE — PROGRESS NOTE ADULT - NS ATTEND AMEND GEN_ALL_CORE FT
he is having significant suctioning requirement/;  every two hours; and hence still in MICU :  rest as above:  fabiano micu attending

## 2025-03-07 NOTE — PROGRESS NOTE ADULT - PROBLEM SELECTOR PLAN 7
Continue with home atorvastatin 20 mg bedtime for HLD, vitamin D supplementation, pantoprazole 40 mg daily, senna 2 tab bedtime, and Miralax 17 gram daily.     General  - DVT prophylaxis: heparin 5000 units q8h --> hep gtt per ICU  - Diet: regular   - Medication reconciliation: complete   - Code: Full   - Medications: Tylenol 650 mg q6h as needed for pain, Maalox 30 mL q4h as needed for acid reflux, melatonin 3 mg bedtime as needed for insomnia, Zofran 4 mg IV q8h as needed for nausea/vomiting      2/16/25 --> plan was discussed with patient's brother Fernando (over the phone, [phone]) in detail. He agrees with plan. All questions answered. He asked that I update Ester (sister, [phone]); she was called and updated as well, also agrees with the plan, also answered all questions.

## 2025-03-08 LAB
ALBUMIN SERPL ELPH-MCNC: 2.9 G/DL — LOW (ref 3.3–5)
ALP SERPL-CCNC: 147 U/L — HIGH (ref 40–120)
ALT FLD-CCNC: 31 U/L — SIGNIFICANT CHANGE UP (ref 10–45)
ANION GAP SERPL CALC-SCNC: 14 MMOL/L — SIGNIFICANT CHANGE UP (ref 5–17)
APTT BLD: 50.6 SEC — HIGH (ref 24.5–35.6)
APTT BLD: 56.8 SEC — HIGH (ref 24.5–35.6)
APTT BLD: 61.9 SEC — HIGH (ref 24.5–35.6)
AST SERPL-CCNC: 30 U/L — SIGNIFICANT CHANGE UP (ref 10–40)
BASOPHILS # BLD AUTO: 0 K/UL — SIGNIFICANT CHANGE UP (ref 0–0.2)
BASOPHILS # BLD AUTO: 0.02 K/UL — SIGNIFICANT CHANGE UP (ref 0–0.2)
BASOPHILS NFR BLD AUTO: 0 % — SIGNIFICANT CHANGE UP (ref 0–2)
BASOPHILS NFR BLD AUTO: 0.1 % — SIGNIFICANT CHANGE UP (ref 0–2)
BILIRUB SERPL-MCNC: 0.6 MG/DL — SIGNIFICANT CHANGE UP (ref 0.2–1.2)
BUN SERPL-MCNC: 67 MG/DL — HIGH (ref 7–23)
CALCIUM SERPL-MCNC: 10.1 MG/DL — SIGNIFICANT CHANGE UP (ref 8.4–10.5)
CHLORIDE SERPL-SCNC: 110 MMOL/L — HIGH (ref 96–108)
CO2 SERPL-SCNC: 25 MMOL/L — SIGNIFICANT CHANGE UP (ref 22–31)
CREAT SERPL-MCNC: 2.4 MG/DL — HIGH (ref 0.5–1.3)
EGFR: 29 ML/MIN/1.73M2 — LOW
EGFR: 29 ML/MIN/1.73M2 — LOW
EOSINOPHIL # BLD AUTO: 0 K/UL — SIGNIFICANT CHANGE UP (ref 0–0.5)
EOSINOPHIL # BLD AUTO: 0.36 K/UL — SIGNIFICANT CHANGE UP (ref 0–0.5)
EOSINOPHIL NFR BLD AUTO: 0 % — SIGNIFICANT CHANGE UP (ref 0–6)
EOSINOPHIL NFR BLD AUTO: 1.8 % — SIGNIFICANT CHANGE UP (ref 0–6)
GAS PNL BLDA: SIGNIFICANT CHANGE UP
GAS PNL BLDV: SIGNIFICANT CHANGE UP
GLUCOSE BLDC GLUCOMTR-MCNC: 115 MG/DL — HIGH (ref 70–99)
GLUCOSE BLDC GLUCOMTR-MCNC: 123 MG/DL — HIGH (ref 70–99)
GLUCOSE SERPL-MCNC: 114 MG/DL — HIGH (ref 70–99)
GRAM STN FLD: ABNORMAL
GRAM STN FLD: ABNORMAL
HCT VFR BLD CALC: 11.6 % — CRITICAL LOW (ref 39–50)
HCT VFR BLD CALC: 27.6 % — LOW (ref 39–50)
HGB BLD-MCNC: 3.5 G/DL — CRITICAL LOW (ref 13–17)
HGB BLD-MCNC: 8.3 G/DL — LOW (ref 13–17)
IMM GRANULOCYTES NFR BLD AUTO: 1.2 % — HIGH (ref 0–0.9)
INR BLD: 1.1 RATIO — SIGNIFICANT CHANGE UP (ref 0.85–1.16)
LYMPHOCYTES # BLD AUTO: 0.7 K/UL — LOW (ref 1–3.3)
LYMPHOCYTES # BLD AUTO: 1.16 K/UL — SIGNIFICANT CHANGE UP (ref 1–3.3)
LYMPHOCYTES # BLD AUTO: 5.2 % — LOW (ref 13–44)
LYMPHOCYTES # BLD AUTO: 5.7 % — LOW (ref 13–44)
MAGNESIUM SERPL-MCNC: 2.6 MG/DL — SIGNIFICANT CHANGE UP (ref 1.6–2.6)
MANUAL SMEAR VERIFICATION: SIGNIFICANT CHANGE UP
MCHC RBC-ENTMCNC: 30.1 G/DL — LOW (ref 32–36)
MCHC RBC-ENTMCNC: 30.2 G/DL — LOW (ref 32–36)
MCHC RBC-ENTMCNC: 31.3 PG — SIGNIFICANT CHANGE UP (ref 27–34)
MCHC RBC-ENTMCNC: 32.4 PG — SIGNIFICANT CHANGE UP (ref 27–34)
MCV RBC AUTO: 104.2 FL — HIGH (ref 80–100)
MCV RBC AUTO: 107.4 FL — HIGH (ref 80–100)
MONOCYTES # BLD AUTO: 0.46 K/UL — SIGNIFICANT CHANGE UP (ref 0–0.9)
MONOCYTES # BLD AUTO: 1.1 K/UL — HIGH (ref 0–0.9)
MONOCYTES NFR BLD AUTO: 3.4 % — SIGNIFICANT CHANGE UP (ref 2–14)
MONOCYTES NFR BLD AUTO: 5.4 % — SIGNIFICANT CHANGE UP (ref 2–14)
MRSA PCR RESULT.: SIGNIFICANT CHANGE UP
NEUTROPHILS # BLD AUTO: 12.38 K/UL — HIGH (ref 1.8–7.4)
NEUTROPHILS # BLD AUTO: 17.36 K/UL — HIGH (ref 1.8–7.4)
NEUTROPHILS NFR BLD AUTO: 85.8 % — HIGH (ref 43–77)
NEUTROPHILS NFR BLD AUTO: 89.7 % — HIGH (ref 43–77)
NEUTS BAND # BLD: 1.7 % — SIGNIFICANT CHANGE UP (ref 0–8)
NEUTS BAND NFR BLD: 1.7 % — SIGNIFICANT CHANGE UP (ref 0–8)
NRBC BLD AUTO-RTO: 0 /100 WBCS — SIGNIFICANT CHANGE UP (ref 0–0)
PHOSPHATE SERPL-MCNC: 3.7 MG/DL — SIGNIFICANT CHANGE UP (ref 2.5–4.5)
PLAT MORPH BLD: NORMAL — SIGNIFICANT CHANGE UP
PLATELET # BLD AUTO: 144 K/UL — LOW (ref 150–400)
PLATELET # BLD AUTO: 218 K/UL — SIGNIFICANT CHANGE UP (ref 150–400)
POTASSIUM SERPL-MCNC: 4.6 MMOL/L — SIGNIFICANT CHANGE UP (ref 3.5–5.3)
POTASSIUM SERPL-SCNC: 4.6 MMOL/L — SIGNIFICANT CHANGE UP (ref 3.5–5.3)
PROT SERPL-MCNC: 7.2 G/DL — SIGNIFICANT CHANGE UP (ref 6–8.3)
PROTHROM AB SERPL-ACNC: 12.6 SEC — SIGNIFICANT CHANGE UP (ref 9.9–13.4)
RBC # BLD: 1.08 M/UL — LOW (ref 4.2–5.8)
RBC # BLD: 2.65 M/UL — LOW (ref 4.2–5.8)
RBC # FLD: 15.3 % — HIGH (ref 10.3–14.5)
RBC # FLD: 15.4 % — HIGH (ref 10.3–14.5)
RBC BLD AUTO: SIGNIFICANT CHANGE UP
S AUREUS DNA NOSE QL NAA+PROBE: SIGNIFICANT CHANGE UP
SODIUM SERPL-SCNC: 149 MMOL/L — HIGH (ref 135–145)
SPECIMEN SOURCE: SIGNIFICANT CHANGE UP
WBC # BLD: 13.54 K/UL — HIGH (ref 3.8–10.5)
WBC # BLD: 20.24 K/UL — HIGH (ref 3.8–10.5)
WBC # FLD AUTO: 13.54 K/UL — HIGH (ref 3.8–10.5)
WBC # FLD AUTO: 20.24 K/UL — HIGH (ref 3.8–10.5)

## 2025-03-08 PROCEDURE — 76604 US EXAM CHEST: CPT | Mod: 26

## 2025-03-08 PROCEDURE — 31500 INSERT EMERGENCY AIRWAY: CPT

## 2025-03-08 PROCEDURE — 31624 DX BRONCHOSCOPE/LAVAGE: CPT

## 2025-03-08 PROCEDURE — 99291 CRITICAL CARE FIRST HOUR: CPT | Mod: 25

## 2025-03-08 PROCEDURE — 71045 X-RAY EXAM CHEST 1 VIEW: CPT | Mod: 26

## 2025-03-08 PROCEDURE — 99292 CRITICAL CARE ADDL 30 MIN: CPT | Mod: 25

## 2025-03-08 RX ORDER — FENTANYL CITRATE-0.9 % NACL/PF 100MCG/2ML
100 SYRINGE (ML) INTRAVENOUS ONCE
Refills: 0 | Status: DISCONTINUED | OUTPATIENT
Start: 2025-03-08 | End: 2025-03-08

## 2025-03-08 RX ORDER — NOREPINEPHRINE BITARTRATE 8 MG
0.05 SOLUTION INTRAVENOUS
Qty: 8 | Refills: 0 | Status: DISCONTINUED | OUTPATIENT
Start: 2025-03-08 | End: 2025-03-10

## 2025-03-08 RX ORDER — PROPOFOL 10 MG/ML
10 INJECTION, EMULSION INTRAVENOUS ONCE
Refills: 0 | Status: COMPLETED | OUTPATIENT
Start: 2025-03-08 | End: 2025-03-08

## 2025-03-08 RX ORDER — MIDAZOLAM IN 0.9 % SOD.CHLORID 1 MG/ML
8 PLASTIC BAG, INJECTION (ML) INTRAVENOUS ONCE
Refills: 0 | Status: DISCONTINUED | OUTPATIENT
Start: 2025-03-08 | End: 2025-03-08

## 2025-03-08 RX ORDER — FENTANYL CITRATE-0.9 % NACL/PF 100MCG/2ML
0.5 SYRINGE (ML) INTRAVENOUS
Qty: 5000 | Refills: 0 | Status: DISCONTINUED | OUTPATIENT
Start: 2025-03-08 | End: 2025-03-10

## 2025-03-08 RX ORDER — PROPOFOL 10 MG/ML
30 INJECTION, EMULSION INTRAVENOUS
Qty: 1000 | Refills: 0 | Status: DISCONTINUED | OUTPATIENT
Start: 2025-03-08 | End: 2025-03-10

## 2025-03-08 RX ADMIN — Medication 4 MILLILITER(S): at 23:10

## 2025-03-08 RX ADMIN — METOPROLOL SUCCINATE 12.5 MILLIGRAM(S): 50 TABLET, EXTENDED RELEASE ORAL at 05:17

## 2025-03-08 RX ADMIN — HEPARIN SODIUM 1900 UNIT(S)/HR: 1000 INJECTION INTRAVENOUS; SUBCUTANEOUS at 10:10

## 2025-03-08 RX ADMIN — LURASIDONE HYDROCHLORIDE 20 MILLIGRAM(S): 120 TABLET, FILM COATED ORAL at 00:19

## 2025-03-08 RX ADMIN — Medication 10 MILLILITER(S): at 21:33

## 2025-03-08 RX ADMIN — ATORVASTATIN CALCIUM 20 MILLIGRAM(S): 80 TABLET, FILM COATED ORAL at 21:32

## 2025-03-08 RX ADMIN — OFLOXACIN 1 DROP(S): 3 SOLUTION OPHTHALMIC at 14:28

## 2025-03-08 RX ADMIN — Medication 1 APPLICATION(S): at 12:29

## 2025-03-08 RX ADMIN — IPRATROPIUM BROMIDE AND ALBUTEROL SULFATE 3 MILLILITER(S): .5; 2.5 SOLUTION RESPIRATORY (INHALATION) at 05:10

## 2025-03-08 RX ADMIN — Medication 15 MILLILITER(S): at 17:26

## 2025-03-08 RX ADMIN — HEPARIN SODIUM 1900 UNIT(S)/HR: 1000 INJECTION INTRAVENOUS; SUBCUTANEOUS at 21:31

## 2025-03-08 RX ADMIN — Medication 4 MILLILITER(S): at 11:20

## 2025-03-08 RX ADMIN — Medication 10 MILLILITER(S): at 00:19

## 2025-03-08 RX ADMIN — GABAPENTIN 150 MILLIGRAM(S): 400 CAPSULE ORAL at 05:17

## 2025-03-08 RX ADMIN — SEVELAMER HYDROCHLORIDE 1600 MILLIGRAM(S): 800 TABLET ORAL at 00:18

## 2025-03-08 RX ADMIN — SEVELAMER HYDROCHLORIDE 1600 MILLIGRAM(S): 800 TABLET ORAL at 05:17

## 2025-03-08 RX ADMIN — Medication 25 GRAM(S): at 10:12

## 2025-03-08 RX ADMIN — OFLOXACIN 1 DROP(S): 3 SOLUTION OPHTHALMIC at 00:20

## 2025-03-08 RX ADMIN — Medication 100 MICROGRAM(S): at 17:27

## 2025-03-08 RX ADMIN — ATORVASTATIN CALCIUM 20 MILLIGRAM(S): 80 TABLET, FILM COATED ORAL at 00:19

## 2025-03-08 RX ADMIN — IPRATROPIUM BROMIDE AND ALBUTEROL SULFATE 3 MILLILITER(S): .5; 2.5 SOLUTION RESPIRATORY (INHALATION) at 11:20

## 2025-03-08 RX ADMIN — OFLOXACIN 1 DROP(S): 3 SOLUTION OPHTHALMIC at 21:32

## 2025-03-08 RX ADMIN — LACOSAMIDE 140 MILLIGRAM(S): 150 TABLET, FILM COATED ORAL at 17:26

## 2025-03-08 RX ADMIN — NOREPINEPHRINE BITARTRATE 8.35 MICROGRAM(S)/KG/MIN: 8 SOLUTION at 21:30

## 2025-03-08 RX ADMIN — LAMOTRIGINE 50 MILLIGRAM(S): 150 TABLET ORAL at 17:26

## 2025-03-08 RX ADMIN — Medication 25 GRAM(S): at 02:10

## 2025-03-08 RX ADMIN — GABAPENTIN 150 MILLIGRAM(S): 400 CAPSULE ORAL at 17:26

## 2025-03-08 RX ADMIN — NYSTATIN 1 APPLICATION(S): 100000 CREAM TOPICAL at 17:28

## 2025-03-08 RX ADMIN — ERYTHROMYCIN 1 APPLICATION(S): 5 OINTMENT OPHTHALMIC at 17:28

## 2025-03-08 RX ADMIN — ESCITALOPRAM OXALATE 15 MILLIGRAM(S): 20 TABLET ORAL at 08:27

## 2025-03-08 RX ADMIN — OFLOXACIN 1 DROP(S): 3 SOLUTION OPHTHALMIC at 10:12

## 2025-03-08 RX ADMIN — OFLOXACIN 1 DROP(S): 3 SOLUTION OPHTHALMIC at 02:03

## 2025-03-08 RX ADMIN — Medication 25 GRAM(S): at 17:25

## 2025-03-08 RX ADMIN — SEVELAMER HYDROCHLORIDE 1600 MILLIGRAM(S): 800 TABLET ORAL at 21:32

## 2025-03-08 RX ADMIN — LEVETIRACETAM 750 MILLIGRAM(S): 10 INJECTION, SOLUTION INTRAVENOUS at 17:26

## 2025-03-08 RX ADMIN — OFLOXACIN 1 DROP(S): 3 SOLUTION OPHTHALMIC at 17:30

## 2025-03-08 RX ADMIN — ERYTHROMYCIN 1 APPLICATION(S): 5 OINTMENT OPHTHALMIC at 02:01

## 2025-03-08 RX ADMIN — HEPARIN SODIUM 1900 UNIT(S)/HR: 1000 INJECTION INTRAVENOUS; SUBCUTANEOUS at 19:29

## 2025-03-08 RX ADMIN — ERYTHROMYCIN 1 APPLICATION(S): 5 OINTMENT OPHTHALMIC at 12:29

## 2025-03-08 RX ADMIN — PROPOFOL 16 MICROGRAM(S)/KG/MIN: 10 INJECTION, EMULSION INTRAVENOUS at 21:30

## 2025-03-08 RX ADMIN — Medication 8 MILLIGRAM(S): at 17:27

## 2025-03-08 RX ADMIN — AMLODIPINE BESYLATE 5 MILLIGRAM(S): 10 TABLET ORAL at 21:32

## 2025-03-08 RX ADMIN — AMLODIPINE BESYLATE 5 MILLIGRAM(S): 10 TABLET ORAL at 00:19

## 2025-03-08 RX ADMIN — Medication 4 MILLILITER(S): at 05:11

## 2025-03-08 RX ADMIN — ERYTHROMYCIN 1 APPLICATION(S): 5 OINTMENT OPHTHALMIC at 05:17

## 2025-03-08 RX ADMIN — IPRATROPIUM BROMIDE AND ALBUTEROL SULFATE 3 MILLILITER(S): .5; 2.5 SOLUTION RESPIRATORY (INHALATION) at 23:10

## 2025-03-08 RX ADMIN — NYSTATIN 1 APPLICATION(S): 100000 CREAM TOPICAL at 05:19

## 2025-03-08 RX ADMIN — OFLOXACIN 1 DROP(S): 3 SOLUTION OPHTHALMIC at 05:18

## 2025-03-08 RX ADMIN — PROPOFOL 10 MILLIGRAM(S): 10 INJECTION, EMULSION INTRAVENOUS at 13:50

## 2025-03-08 RX ADMIN — Medication 40 MILLIGRAM(S): at 12:29

## 2025-03-08 RX ADMIN — HEPARIN SODIUM 1700 UNIT(S)/HR: 1000 INJECTION INTRAVENOUS; SUBCUTANEOUS at 02:02

## 2025-03-08 RX ADMIN — LACOSAMIDE 140 MILLIGRAM(S): 150 TABLET, FILM COATED ORAL at 05:18

## 2025-03-08 RX ADMIN — LURASIDONE HYDROCHLORIDE 20 MILLIGRAM(S): 120 TABLET, FILM COATED ORAL at 21:33

## 2025-03-08 RX ADMIN — LAMOTRIGINE 50 MILLIGRAM(S): 150 TABLET ORAL at 05:17

## 2025-03-08 RX ADMIN — Medication 200 GRAM(S): at 00:18

## 2025-03-08 RX ADMIN — LEVETIRACETAM 750 MILLIGRAM(S): 10 INJECTION, SOLUTION INTRAVENOUS at 05:17

## 2025-03-08 RX ADMIN — IPRATROPIUM BROMIDE AND ALBUTEROL SULFATE 3 MILLILITER(S): .5; 2.5 SOLUTION RESPIRATORY (INHALATION) at 17:38

## 2025-03-08 RX ADMIN — Medication 2.23 MICROGRAM(S)/KG/HR: at 21:30

## 2025-03-08 RX ADMIN — Medication 4 MILLILITER(S): at 17:38

## 2025-03-08 NOTE — PROGRESS NOTE ADULT - SUBJECTIVE AND OBJECTIVE BOX
[facility] INPATIENT PROGRESS NOTE     Interval Hx:   03-08-25: Mr. Gr was seen at bedside today.    Meds:   MEDICATIONS  (STANDING):  albuterol/ipratropium for Nebulization 3 milliLiter(s) Nebulizer every 6 hours  amLODIPine   Tablet 5 milliGRAM(s) Oral daily  atorvastatin 20 milliGRAM(s) Oral at bedtime  cholecalciferol 1000 Unit(s) Oral daily  erythromycin   Ointment 1 Application(s) Both EYES four times a day  escitalopram 15 milliGRAM(s) Oral with breakfast  gabapentin Solution 150 milliGRAM(s) Oral every 12 hours  heparin  Infusion. 1700 Unit(s)/Hr (17 mL/Hr) IV Continuous <Continuous>  influenza  Vaccine (HIGH DOSE) 0.5 milliLiter(s) IntraMuscular once  lacosamide IVPB 200 milliGRAM(s) IV Intermittent every 12 hours  lamoTRIgine 50 milliGRAM(s) Oral two times a day  lanolin Ointment 1 Application(s) Topical daily  levETIRAcetam   Injectable 750 milliGRAM(s) IV Push every 12 hours  lurasidone 20 milliGRAM(s) Oral at bedtime  metoprolol tartrate 12.5 milliGRAM(s) Oral two times a day  nystatin Powder 1 Application(s) Topical two times a day  ofloxacin 0.3% Solution 1 Drop(s) Right EYE every 4 hours  pantoprazole  Injectable 40 milliGRAM(s) IV Push daily  piperacillin/tazobactam IVPB.- 3.375 Gram(s) IV Intermittent once  piperacillin/tazobactam IVPB.. 3.375 Gram(s) IV Intermittent every 8 hours  polyethylene glycol 3350 17 Gram(s) Oral daily  senna Syrup 10 milliLiter(s) Oral at bedtime  sevelamer carbonate Powder 1600 milliGRAM(s) Oral every 8 hours  sodium chloride 3%  Inhalation 4 milliLiter(s) Inhalation every 6 hours    MEDICATIONS  (PRN):  artificial tears (preservative free) Ophthalmic Solution 1 Drop(s) Both EYES every 6 hours PRN Dry Eyes  tranexamic acid Injectable for Topical Use 5 milliLiter(s) Topical once PRN Excessive Lip Bleeding    Vital Signs Last 24 Hrs  T(C): 37.8 (08 Mar 2025 00:00), Max: 38.3 (07 Mar 2025 21:00)  T(F): 100.1 (08 Mar 2025 00:00), Max: 100.9 (07 Mar 2025 21:00)  HR: 107 (08 Mar 2025 04:00) (86 - 120)  BP: 123/59 (08 Mar 2025 04:00) (123/59 - 195/92)  BP(mean): 85 (08 Mar 2025 04:00) (85 - 132)  RR: 37 (08 Mar 2025 04:00) (20 - 67)  SpO2: 100% (08 Mar 2025 04:00) (83% - 100%)    Parameters below as of 08 Mar 2025 04:00  Patient On (Oxygen Delivery Method): nasal cannula, high flow  O2 Flow (L/min): 80  O2 Concentration (%): 50    Physical Exam:  Gen: NAD  HEENT: EOMI, MMM  Chest: equal chest rise  Cardiac: regular   Abd: non distended    Labs:                        8.3    13.54 )-----------( 144      ( 08 Mar 2025 01:11 )             27.6     CBC Full  -  ( 08 Mar 2025 01:11 )  WBC Count : 13.54 K/uL  RBC Count : 2.65 M/uL  Hemoglobin : 8.3 g/dL  Hematocrit : 27.6 %  Platelet Count - Automated : 144 K/uL  Mean Cell Volume : 104.2 fl  Mean Cell Hemoglobin : 31.3 pg  Mean Cell Hemoglobin Concentration : 30.1 g/dL    03-08    149[H]  |  110[H]  |  67[H]  ----------------------------<  114[H]  4.6   |  25  |  2.40[H]    Ca    10.1      08 Mar 2025 00:16  Phos  3.7     03-08  Mg     2.6     03-08    TPro  7.2  /  Alb  2.9[L]  /  TBili  0.6  /  DBili  x   /  AST  30  /  ALT  31  /  AlkPhos  147[H]  03-08    PT/INR - ( 08 Mar 2025 00:15 )   PT: 12.6 sec;   INR: 1.10 ratio         PTT - ( 08 Mar 2025 00:15 )  PTT:50.6 sec  Bilirubin Total: 0.6 mg/dL (03-08-25 @ 00:16)   [facility]AT[facility] INPATIENT PROGRESS NOTE     Interval Hx:   03-08-25: Mr. Gr was seen at bedside today, awake and alert, continues in MICU, on HFNC, no signs of bleeding, although alert and answering questions, not back to his baseline yet     Meds:   MEDICATIONS  (STANDING):  albuterol/ipratropium for Nebulization 3 milliLiter(s) Nebulizer every 6 hours  amLODIPine   Tablet 5 milliGRAM(s) Oral daily  atorvastatin 20 milliGRAM(s) Oral at bedtime  cholecalciferol 1000 Unit(s) Oral daily  erythromycin   Ointment 1 Application(s) Both EYES four times a day  escitalopram 15 milliGRAM(s) Oral with breakfast  gabapentin Solution 150 milliGRAM(s) Oral every 12 hours  heparin  Infusion. 1700 Unit(s)/Hr (17 mL/Hr) IV Continuous <Continuous>  influenza  Vaccine (HIGH DOSE) 0.5 milliLiter(s) IntraMuscular once  lacosamide IVPB 200 milliGRAM(s) IV Intermittent every 12 hours  lamoTRIgine 50 milliGRAM(s) Oral two times a day  lanolin Ointment 1 Application(s) Topical daily  levETIRAcetam   Injectable 750 milliGRAM(s) IV Push every 12 hours  lurasidone 20 milliGRAM(s) Oral at bedtime  metoprolol tartrate 12.5 milliGRAM(s) Oral two times a day  nystatin Powder 1 Application(s) Topical two times a day  ofloxacin 0.3% Solution 1 Drop(s) Right EYE every 4 hours  pantoprazole  Injectable 40 milliGRAM(s) IV Push daily  piperacillin/tazobactam IVPB.- 3.375 Gram(s) IV Intermittent once  piperacillin/tazobactam IVPB.. 3.375 Gram(s) IV Intermittent every 8 hours  polyethylene glycol 3350 17 Gram(s) Oral daily  senna Syrup 10 milliLiter(s) Oral at bedtime  sevelamer carbonate Powder 1600 milliGRAM(s) Oral every 8 hours  sodium chloride 3%  Inhalation 4 milliLiter(s) Inhalation every 6 hours    MEDICATIONS  (PRN):  artificial tears (preservative free) Ophthalmic Solution 1 Drop(s) Both EYES every 6 hours PRN Dry Eyes  tranexamic acid Injectable for Topical Use 5 milliLiter(s) Topical once PRN Excessive Lip Bleeding    Vital Signs Last 24 Hrs  T(C): 37.8 (08 Mar 2025 00:00), Max: 38.3 (07 Mar 2025 21:00)  T(F): 100.1 (08 Mar 2025 00:00), Max: 100.9 (07 Mar 2025 21:00)  HR: 107 (08 Mar 2025 04:00) (86 - 120)  BP: 123/59 (08 Mar 2025 04:00) (123/59 - 195/92)  BP(mean): 85 (08 Mar 2025 04:00) (85 - 132)  RR: 37 (08 Mar 2025 04:00) (20 - 67)  SpO2: 100% (08 Mar 2025 04:00) (83% - 100%)    Parameters below as of 08 Mar 2025 04:00  Patient On (Oxygen Delivery Method): nasal cannula, high flow  O2 Flow (L/min): 80  O2 Concentration (%): 50    Physical Exam:  Gen: NAD  HEENT: EOMI, MMM  Chest: equal chest rise  Cardiac: regular   Abd: non distended    Labs:                        8.3    13.54 )-----------( 144      ( 08 Mar 2025 01:11 )             27.6     CBC Full  -  ( 08 Mar 2025 01:11 )  WBC Count : 13.54 K/uL  RBC Count : 2.65 M/uL  Hemoglobin : 8.3 g/dL  Hematocrit : 27.6 %  Platelet Count - Automated : 144 K/uL  Mean Cell Volume : 104.2 fl  Mean Cell Hemoglobin : 31.3 pg  Mean Cell Hemoglobin Concentration : 30.1 g/dL    03-08    149[H]  |  110[H]  |  67[H]  ----------------------------<  114[H]  4.6   |  25  |  2.40[H]    Ca    10.1      08 Mar 2025 00:16  Phos  3.7     03-08  Mg     2.6     03-08    TPro  7.2  /  Alb  2.9[L]  /  TBili  0.6  /  DBili  x   /  AST  30  /  ALT  31  /  AlkPhos  147[H]  03-08    PT/INR - ( 08 Mar 2025 00:15 )   PT: 12.6 sec;   INR: 1.10 ratio         PTT - ( 08 Mar 2025 00:15 )  PTT:50.6 sec  Bilirubin Total: 0.6 mg/dL (03-08-25 @ 00:16)

## 2025-03-08 NOTE — PROGRESS NOTE ADULT - SUBJECTIVE AND OBJECTIVE BOX
[facility]  JACINTO Horton Y. Patel, S. Shah, G. Casimir  [phone]  ([phone] - weekdays after 5pm and weekends)    Name: OSCAR MILAN  Age/Gender: 69y Male  MRN: [id number]    Interval History:  Patient seen and examined this morning.  Resting on HFNC, denies pain or dyspnea.  Per RN, secretions less but still lots of thick secretions.    Notes reviewed. Febrile overnight.      Allergies: seasonal allergies (Unknown)  penicillin (Hives)      Objective:  Vitals:   T(F): 100.1 (03-08-25 @ 00:00), Max: 100.9 (03-07-25 @ 21:00)  HR: 91 (03-08-25 @ 07:00) (86 - 120)  BP: 109/57 (03-08-25 @ 07:00) (109/57 - 195/92)  RR: 39 (03-08-25 @ 07:00) (20 - 67)  SpO2: 99% (03-08-25 @ 07:00) (83% - 100%)  Physical Examination:  General: no acute distress, HFNC   HEENT: NC/AT, upper lip scab, nasal trumpet  Respiratory: decreased breath sounds bilaterally   Cardiovascular: S1 S2 present, normal rate   Gastrointestinal: soft, nondistended, nontender   Extremities: no LE edema, no cyanosis  Skin: no visible rash    Laboratory Studies:  CBC:                       8.3    13.54 )-----------( 144      ( 08 Mar 2025 01:11 )             27.6     WBC Trend:  13.54 03-08-25 @ 01:11  20.24 03-08-25 @ 00:16  14.83 03-06-25 @ 23:42  9.52 03-06-25 @ 00:18  8.84 03-05-25 @ 00:15  8.48 03-04-25 @ 00:40  7.06 03-03-25 @ 12:41  7.14 03-03-25 @ 00:57  8.02 03-03-25 @ 00:01  9.10 03-02-25 @ 12:01  9.10 03-02-25 @ 03:49    CMP: 03-08    149[H]  |  110[H]  |  67[H]  ----------------------------<  114[H]  4.6   |  25  |  2.40[H]    Ca    10.1      08 Mar 2025 00:16  Phos  3.7     03-08  Mg     2.6     03-08    TPro  7.2  /  Alb  2.9[L]  /  TBili  0.6  /  DBili  x   /  AST  30  /  ALT  31  /  AlkPhos  147[H]  03-08    Creatinine: 2.40 mg/dL (03-08-25 @ 00:16)  Creatinine: 2.52 mg/dL (03-06-25 @ 23:42)  Creatinine: 2.92 mg/dL (03-06-25 @ 00:11)  Creatinine: 3.31 mg/dL (03-05-25 @ 00:15)  Creatinine: 3.99 mg/dL (03-04-25 @ 00:26)  Creatinine: 4.34 mg/dL (03-03-25 @ 00:01)  Creatinine: 4.06 mg/dL (03-02-25 @ 12:01)  Creatinine: 4.11 mg/dL (03-02-25 @ 03:48)  Creatinine: 4.28 mg/dL (03-01-25 @ 12:36)    LIVER FUNCTIONS - ( 08 Mar 2025 00:16 )  Alb: 2.9 g/dL / Pro: 7.2 g/dL / ALK PHOS: 147 U/L / ALT: 31 U/L / AST: 30 U/L / GGT: x           Microbiology: reviewed   Culture - Sputum (collected 03-07-25 @ 18:42)  Source: Sputum Sputum  Gram Stain (03-08-25 @ 07:01):    Rare polymorphonuclear leukocytes per low power field    Few Squamous epithelial cells per low power field    Moderate Gram Negative Rods seen per oil power field    Culture - Eye (collected 03-05-25 @ 16:15)  Source: Eye Conjunctiva-Right  Gram Stain (03-08-25 @ 00:03):    No polymorphonuclear cells seen    No organisms seen    by cytocentrifuge  Preliminary Report (03-08-25 @ 00:42):    Rare Staphylococcus epidermidis    Radiology: reviewed     Medications:  albuterol/ipratropium for Nebulization 3 milliLiter(s) Nebulizer every 6 hours  amLODIPine   Tablet 5 milliGRAM(s) Oral daily  artificial tears (preservative free) Ophthalmic Solution 1 Drop(s) Both EYES every 6 hours PRN  atorvastatin 20 milliGRAM(s) Oral at bedtime  cholecalciferol 1000 Unit(s) Oral daily  erythromycin   Ointment 1 Application(s) Both EYES four times a day  escitalopram 15 milliGRAM(s) Oral with breakfast  gabapentin Solution 150 milliGRAM(s) Oral every 12 hours  heparin  Infusion. 1700 Unit(s)/Hr IV Continuous <Continuous>  influenza  Vaccine (HIGH DOSE) 0.5 milliLiter(s) IntraMuscular once  lacosamide IVPB 200 milliGRAM(s) IV Intermittent every 12 hours  lamoTRIgine 50 milliGRAM(s) Oral two times a day  lanolin Ointment 1 Application(s) Topical daily  levETIRAcetam   Injectable 750 milliGRAM(s) IV Push every 12 hours  lurasidone 20 milliGRAM(s) Oral at bedtime  metoprolol tartrate 12.5 milliGRAM(s) Oral two times a day  nystatin Powder 1 Application(s) Topical two times a day  ofloxacin 0.3% Solution 1 Drop(s) Right EYE every 4 hours  pantoprazole  Injectable 40 milliGRAM(s) IV Push daily  piperacillin/tazobactam IVPB.- 3.375 Gram(s) IV Intermittent once  piperacillin/tazobactam IVPB.. 3.375 Gram(s) IV Intermittent every 8 hours  polyethylene glycol 3350 17 Gram(s) Oral daily  senna Syrup 10 milliLiter(s) Oral at bedtime  sevelamer carbonate Powder 1600 milliGRAM(s) Oral every 8 hours  sodium chloride 3%  Inhalation 4 milliLiter(s) Inhalation every 6 hours  tranexamic acid Injectable for Topical Use 5 milliLiter(s) Topical once PRN    Current Antimicrobials:  piperacillin/tazobactam IVPB.- 3.375 Gram(s) IV Intermittent once  piperacillin/tazobactam IVPB.. 3.375 Gram(s) IV Intermittent every 8 hours    Prior/Completed Antimicrobials:  piperacillin/tazobactam IVPB.  piperacillin/tazobactam IVPB.-  piperacillin/tazobactam IVPB..  piperacillin/tazobactam IVPB..  piperacillin/tazobactam IVPB...  remdesivir  IVPB  remdesivir  IVPB  remdesivir  IVPB  vancomycin  IVPB.

## 2025-03-08 NOTE — PROGRESS NOTE ADULT - ASSESSMENT
69 year old male with a past medical history of hypertension, hyperlipemia VAZQUEZ (on CPAP at bedtime, NC 2 liters during the day), CKD stage 3, epilepsy, schizophrenia, developmental delay, metastatic testicular cancer (s/p orchiectomy, actively on chemotherapy, last dose of etoposide/cisplatin on 6/2024), presenting with acute on chronic hypoxemic respiratory failure, secondary to (a) COVID-19 infection, (b) superimposed pneumonia after recent influenza infection, and/or (c) fluid overload.     MICU consulted and accepted after RRT due to increased work of breathing    NEURO:  #Mental status:  -Off precedex  -Back to baseline mental status    #Epilepsy:  - Continue with home meds which include Lacosamide, Lurasidone, Lamotrigine - increased to 50 BID  - EEG negative    #Brain Mets:  - MRI brain now with 5.4 mm enhancing lesion in the LEFT middle cerebellar peduncle with associated edema suspicious for metastases    # Schizophrenia  - Lurasidone halved to reduce oversedation    CV:  #Echo:  Recent echo last admission showing Left ventricular cavity is normal in size. Left ventricular systolic function is normal with an ejection fraction of 62 %. There are no regional wall motion abnormalities seen. Normal right ventricular cavity size and normal right ventricular systolic function.   - Repeat echo continuing to show EF 70%    #Afib w/RVR  - New onset Afib RVR (on tele, confirmed with EKG 2/19)   - Plan: Started metoprolol 5mg IVP q6 hours & Hep gtt -> metoprolol restarted  - If rate remains uncontrolled, can start Cardizem drip at 5mg/h  - c/w Heparin drip    #HTN:  - On Hydralazine 25mg TID, Amlodipine 5mg  -amlodipine 5 restarted 3/7 given hypertension    PULM:  #Vent   - Extubated 3/4    #AHRF  - Patient admitted to the hospital for AHRF, found to be COVID (+)  - Likely 2/2 PNA, does not appear to CHF decompensation  - Continue with airway clearance regimen - duoneb, hypertonic saline, chest PT  - CT chest with b/l GGOs, possible early COVID fibrosis, area of consolidation ?infectious vs. atelectasis  -still with significant secretion burden, may require trach if no improvement    RENAL:  #SHAGUFTA: - Resolving  - Hx of CKD stage 3, Cr 2.38 at baseline, peak at 4.24  - In setting of COVID (+)  - Pre-renal, FeUrea 29%  - Monitor Cr  - Avoid nephrotoxic meds  - Wakefield in place  -appears to be near baseline at 3/7    GI:  No acute issues     #Diet: Tube feeds via OG tube  #Bowel Regimen  - On Senna and Miralax    ENDO:  No acute issues  Thyroid nodule noted in prior notes    HEMATOLOGIC:  #Thrombocytopenic  - Unclear origin  - Concern for possible mets to bone marrow? vs due to infection  - Continue to monitor  - Transfuse to maintain Plt>10K unless actively bleeding then maintain >50K  - Heme-Onc recs appreciated    #Anemia  - CKD vs. cancer vs. sepsis  - 1 unit PRBCs given  - CTM CBC  - No sign of bleeding    #DVT prophylaxis   - SCD    ID:  #COVID-19  - Concern for possible superimposed PNA   - S/p zosyn 2/16- 2/28  - S/p Remdesivir  - Blood cx negative    SKIN:  #Lines:  2x PIV    Ethics:  - Full Code  - Contact: Sister Ester [phone]  69 year old male with a past medical history of hypertension, hyperlipemia VAZQUEZ (on CPAP at bedtime, NC 2 liters during the day), CKD stage 3, epilepsy, schizophrenia, developmental delay, metastatic testicular cancer (s/p orchiectomy, actively on chemotherapy, last dose of etoposide/cisplatin on 6/2024), presenting with acute on chronic hypoxemic respiratory failure, secondary to (a) COVID-19 infection, (b) superimposed pneumonia after recent influenza infection, and/or (c) fluid overload. MICU consulted and accepted after RRT due to increased work of breathing.    NEURO:  #Mental status:  -Worsening mental status 3/8, likely iso respiratory distress  -pending sedation and intubation    #Epilepsy:  - Continue with home meds which include Lacosamide, Lurasidone, Lamotrigine - increased to 50 BID  - EEG negative    #Brain Mets:  - MRI brain now with 5.4 mm enhancing lesion in the LEFT middle cerebellar peduncle with associated edema suspicious for metastases    # Schizophrenia  - Lurasidone halved to reduce oversedation    CV:  #Echo:  Recent echo last admission showing Left ventricular cavity is normal in size. Left ventricular systolic function is normal with an ejection fraction of 62 %. There are no regional wall motion abnormalities seen. Normal right ventricular cavity size and normal right ventricular systolic function.   - Repeat echo continuing to show EF 70%    #Afib w/RVR  - New onset Afib RVR (on tele, confirmed with EKG 2/19)   - Plan: Started metoprolol 5mg IVP q6 hours & Hep gtt -> metoprolol restarted  - If rate remains uncontrolled, can start Cardizem drip at 5mg/h  - c/w Heparin drip    #HTN:  - On Hydralazine 25mg TID, Amlodipine 5mg  -amlodipine 5 restarted 3/7 given hypertension    PULM:  #Vent   - Extubated 3/4  -Likely reintubated on 3/8    #AHRF  - Patient admitted to the hospital for AHRF, found to be COVID (+)  - Likely 2/2 PNA, does not appear to CHF decompensation  -worsening secretions and consolidations on 3/8, iso fever/leukocytosis may be new PNA. Management as below    RENAL:  #SHAGUFTA: - Resolving  - Hx of CKD stage 3, Cr 2.38 at baseline, peak at 4.24  - In setting of COVID (+)  - Pre-renal, FeUrea 29%  - Monitor Cr  - Avoid nephrotoxic meds  - Wakefield in place  -appears to be near baseline at 3/7    GI:  No acute issues     #Diet: Tube feeds via OG tube  #Bowel Regimen  - On Senna and Miralax    ENDO:  No acute issues  Thyroid nodule noted in prior notes    HEMATOLOGIC:  #Thrombocytopenic  - Unclear origin  - Concern for possible mets to bone marrow? vs due to infection  - Continue to monitor  - Transfuse to maintain Plt>10K unless actively bleeding then maintain >50K  - Heme-Onc recs appreciated    #Anemia  - CKD vs. cancer vs. sepsis  - 1 unit PRBCs given  - CTM CBC  - No sign of bleeding    #DVT prophylaxis   - SCD    ID:  #Sepsis  #PNA  -new fever and leukocytosis 3/7-3/8 with worsening pulmonary findings, c/f PNA  -f/u Blood/sputum Cx  -Zosyn started 3/8  -ctm    #COVID-19  - Concern for possible superimposed PNA   - S/p zosyn 2/16- 2/28  - S/p Remdesivir    SKIN:  #Lines:  2x PIV    Ethics:  - Full Code  - Contact: Sister Ester [phone]

## 2025-03-08 NOTE — PROGRESS NOTE ADULT - ASSESSMENT
Mr. Gr is a 69 year old male with PMHx of seizure disorder, sleep apnea, HTN, chronic idiopathic constipation, stage III CKD, severe obesity, secondary hyperparathyroidism, anemia, thrombocytopenia, hyperlipidemia, testicular cancer under treatment with Dr. Ovalles who is admitted for acute hypoxic respiratory failure secondary to COVID vs superimposed pneumonia with new onset thrombocytopenia. He was recently discharged 02/06/2025 after a tenous stay including intubation in the ICU for respiratory failure in setting of seizure. He had recovered and was discharged to rehab.      Former smoker, quit 14y prior, denied ETOH, drug use. Lives at home. Does not have children. Denies family history of blood disorders or malignancy.  Denies previous workplace related exposures.  Denies previous history of blood transfusions.  Denied history of thromboses.  Denied history of  requiring anticoagulation.     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  Margins were negative.  pT3Nx. CT C/A/P with few left para-aortic and left iliac chain lymph nodes largest measuring 8.1 cm left para-aortic node, bilateral subcentimeter noncalcified pulmonary nodules measuring up to 7 mm. AFP, LDH, hCG were all elevated. 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 Started on adjuvant therapy with Cisplatin+Etoposide, so far completed 4 cycles of treatment with cisplatin dose reduced 50% and Etoposide 50% due to thrombocytopenia.      02/19/2025: on HFNC, noted tachycardia and fever, Klebsiella in urine as well, thrombocytopenia stable/improving, no signs of active bleeding     02/20/2025: developed A.fib with RVR and was placed on heparin drip and pending cardiology eval    02/21/2025: awake, on AVAPS overnight, noted RRT for hypoxia as pt removed HFNC at some point in time, good response thereafter     02/22/2025:  continues on AVAPS this morning, noted RRT overnight for hypoxia, hypercapnia, ICU following, US LE negative for DVT, counts overall stable/improved     02/23/2025: progressive respiratory failure, noted ongoing RTT this morning with pending intubation and transfer to MICU     02/24/2025: intubated 02/23/2025, sedated, on pressor support, no signs of bleeding, counts noted, no signs of bleeding     02/25/2025: continues in MICU, intubated and sedated, no signs of bleeding    02/26/2025: continues in MICU, intubated, without signs of bleeding, continues on heparin drip     02/27/2025:  remains under the care of the ICU, intubated, no signs of bleeding and continues on heparin drip, counts stable, has not required PRBC support this admission    02/28/2025: continues under the care of MICU, continues intubated, no signs of bleeding, on heparin drip    03/01/2025: continues in MICU, intubated, direct josefina IgG positive, eluate negative, not likely to be clinically significant     03/02/2025:  continues in MICU, nursing staff at bedside, no overnight events noted. CTH without acute intracranial pathology     03/03/2025: continues in MICU, intubated, on heparin drip, 1u PRBC overnight, no signs of bleeding, platelet count stable     03/04/2025: awake, moving arms spontaneously on exam, continues without much interaction however. No signs of bleeding, continues heparin drip, continues intubated in MICU     03/05/2025: extubated 03/04/2025, continues in MICU, awake and alert this morning and able to speak full sentences, discussed/reviewed findings, continues on heparin drip     03/06/2025: nursing staff at bedside, bipap overnight, without signs of bleeding, counts noted stable, renal function improving     03/07/2025:  no signs of bleeding, counts noted, continues heparin drip, continues in MICU      #Acute hypoxic respiratory failure  # COVID  - CT noted with bilateral GGO  - ID following  - Pulmonary following  - Antibiotics per primary team/MICU and ID   - Intubated 02/23/2025 AM, MICU   - extubated 03/04/2025    # Thrombocytopenia   - Did occur during most recent admission and improved to normal prior to discharge   - Without signs of bleeding  - Could be multifactorial, possibly due to infection   - Continue to trend  - Transfuse to maintain Plt > 10k unless actively bleeding then maintain > 50k     # Brain lesion  - pt with hx of seizures  - MRI brain now with 5.4 mm enhancing lesion in the LEFT middle cerebellar peduncle with associated edema suspicious for metastases  - MRI Lumbar negative for acute disease  - Small lesion without mass effect or edema, recommended to correlate per neurology if foci and locus are concordant with seizure activity  - Neurology recs noted, new lesion not likely to cause seizure  - It is rare, but nonetheless not impossible, for testicular cancer to be metastatic to the brain  - He will need another PET-CT, can be completed outpatient once stable if concern can proceed with biopsy at that time   - Previous endocrinology eval for thyroid nodule noted  - AFP/BHCH normal,  previously   - Repeat CTH without acute intracranial pathology       # 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 Ovalles, agree with current plan  - Follow up as outpatient with Franki Ovalles MD     # Acute kidney injury on CKD Stage IIIA  - Likely multifactorial  - Nephrology consult and recs noted  - Continue to trend     # Normocytic anemia  - Chronic, has hx of PRBC during chemo 07/2024  - Noted Anti E, Anti-K Anti-C antibodies previously  - direct josefina IgG positive, eluate negative, not likely to be clinically significant   - s/p 1u PRBC 03/03/2025   - Monitor for bleeding  - Recommend to transfuse to maintain Hb > 7    # Klebsiella UTI  -Antibiotics per ID and primary team       Recommendations  - Trend CBC daily   - Transfuse to maintain Hb > 7   - Transfuse to maintain Plt >10k unless active bleeding then >50k   - Monitor renal function  - Cardiology follow up to address anticoagulation, currently on heparin drip   - f/u ongoing ICU recs         Thank you for allowing me to participate in the care of Mr. Gr please do not hesitate to call or text me if you have further questions or concerns.     Brayan Mart MD  [facility]   Division of Hematology/Oncology  [address] [place] [address], [address]  [place] [address]  P: [phone]  F: [phone]    Attestation:    ----Pt evaluated including face-to-face interaction in addition to chart review, reviewing treatment plan, and managing the patient’s chronic diagnoses as listed in the assessment----        Mr. Gr is a 69 year old male with PMHx of seizure disorder, sleep apnea, HTN, chronic idiopathic constipation, stage III CKD, severe obesity, secondary hyperparathyroidism, anemia, thrombocytopenia, hyperlipidemia, testicular cancer under treatment with Dr. Ovalles who is admitted for acute hypoxic respiratory failure secondary to COVID vs superimposed pneumonia with new onset thrombocytopenia. He was recently discharged 02/06/2025 after a tenous stay including intubation in the ICU for respiratory failure in setting of seizure. He had recovered and was discharged to rehab.      Former smoker, quit 14y prior, denied ETOH, drug use. Lives at home. Does not have children. Denies family history of blood disorders or malignancy.  Denies previous workplace related exposures.  Denies previous history of blood transfusions.  Denied history of thromboses.  Denied history of  requiring anticoagulation.     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  Margins were negative.  pT3Nx. CT C/A/P with few left para-aortic and left iliac chain lymph nodes largest measuring 8.1 cm left para-aortic node, bilateral subcentimeter noncalcified pulmonary nodules measuring up to 7 mm. AFP, LDH, hCG were all elevated. 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 Started on adjuvant therapy with Cisplatin+Etoposide, so far completed 4 cycles of treatment with cisplatin dose reduced 50% and Etoposide 50% due to thrombocytopenia.      02/19/2025: on HFNC, noted tachycardia and fever, Klebsiella in urine as well, thrombocytopenia stable/improving, no signs of active bleeding     02/20/2025: developed A.fib with RVR and was placed on heparin drip and pending cardiology eval    02/21/2025: awake, on AVAPS overnight, noted RRT for hypoxia as pt removed HFNC at some point in time, good response thereafter     02/22/2025:  continues on AVAPS this morning, noted RRT overnight for hypoxia, hypercapnia, ICU following, US LE negative for DVT, counts overall stable/improved     02/23/2025: progressive respiratory failure, noted ongoing RTT this morning with pending intubation and transfer to MICU     02/24/2025: intubated 02/23/2025, sedated, on pressor support, no signs of bleeding, counts noted, no signs of bleeding     02/25/2025: continues in MICU, intubated and sedated, no signs of bleeding    02/26/2025: continues in MICU, intubated, without signs of bleeding, continues on heparin drip     02/27/2025:  remains under the care of the ICU, intubated, no signs of bleeding and continues on heparin drip, counts stable, has not required PRBC support this admission    02/28/2025: continues under the care of MICU, continues intubated, no signs of bleeding, on heparin drip    03/01/2025: continues in MICU, intubated, direct josefina IgG positive, eluate negative, not likely to be clinically significant     03/02/2025:  continues in MICU, nursing staff at bedside, no overnight events noted. CTH without acute intracranial pathology     03/03/2025: continues in MICU, intubated, on heparin drip, 1u PRBC overnight, no signs of bleeding, platelet count stable     03/04/2025: awake, moving arms spontaneously on exam, continues without much interaction however. No signs of bleeding, continues heparin drip, continues intubated in MICU     03/05/2025: extubated 03/04/2025, continues in MICU, awake and alert this morning and able to speak full sentences, discussed/reviewed findings, continues on heparin drip     03/06/2025: nursing staff at bedside, bipap overnight, without signs of bleeding, counts noted stable, renal function improving     03/07/2025:  no signs of bleeding, counts noted, continues heparin drip, continues in MICU    03/08/2025: on HFNC, no signs of bleeding, although alert and answering questions, not back to his baseline yet, continues in MICU      #Acute hypoxic respiratory failure  # COVID  - CT noted with bilateral GGO  - ID following  - Pulmonary following  - Antibiotics per primary team/MICU and ID   - Intubated 02/23/2025 AM, MICU   - extubated 03/04/2025    # Thrombocytopenia   - Did occur during most recent admission and improved to normal prior to discharge   - Without signs of bleeding  - Could be multifactorial, possibly due to infection   - Continue to trend  - Transfuse to maintain Plt > 10k unless actively bleeding then maintain > 50k     # Brain lesion  - pt with hx of seizures  - MRI brain now with 5.4 mm enhancing lesion in the LEFT middle cerebellar peduncle with associated edema suspicious for metastases  - MRI Lumbar negative for acute disease  - Small lesion without mass effect or edema, recommended to correlate per neurology if foci and locus are concordant with seizure activity  - Neurology recs noted, new lesion not likely to cause seizure  - It is rare, but nonetheless not impossible, for testicular cancer to be metastatic to the brain  - He will need another PET-CT, can be completed outpatient once stable if concern can proceed with biopsy at that time   - Previous endocrinology eval for thyroid nodule noted  - AFP/BHCH normal,  previously   - Repeat CTH without acute intracranial pathology       # 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 Ovalles, agree with current plan  - Follow up as outpatient with Franki Ovalles MD     # Acute kidney injury on CKD Stage IIIA  - Likely multifactorial  - Nephrology consult and recs noted  - Continue to trend     # Normocytic anemia  - Chronic, has hx of PRBC during chemo 07/2024  - Noted Anti E, Anti-K Anti-C antibodies previously  - direct josefina IgG positive, eluate negative, not likely to be clinically significant   - s/p 1u PRBC 03/03/2025   - Monitor for bleeding  - Recommend to transfuse to maintain Hb > 7    # Klebsiella UTI  -Antibiotics per ID and primary team       Recommendations  - Trend CBC daily   - Transfuse to maintain Hb > 7   - Transfuse to maintain Plt >10k unless active bleeding then >50k   - Monitor renal function  - Cardiology follow up to address anticoagulation, currently on heparin drip   - f/u ongoing ICU recs         Thank you for allowing me to participate in the care of Mr. Gr please do not hesitate to call or text me if you have further questions or concerns.     Brayan Mart MD  [facility] [place]   Division of Hematology/Oncology  [facility], [address]  [place] [address]  P: [phone]  F: [phone]    Attestation:    ----Pt evaluated including face-to-face interaction in addition to chart review, reviewing treatment plan, and managing the patient’s chronic diagnoses as listed in the assessment----

## 2025-03-08 NOTE — PROGRESS NOTE ADULT - PROBLEM SELECTOR PLAN 7
Continue with home atorvastatin 20 mg bedtime for HLD, vitamin D supplementation, pantoprazole 40 mg daily, senna 2 tab bedtime, and Miralax 17 gram daily.     General  - DVT prophylaxis: heparin 5000 units q8h --> hep gtt per ICU  - Diet: regular   - Medication reconciliation: complete   - Code: Full   - Medications: Tylenol 650 mg q6h as needed for pain, Maalox 30 mL q4h as needed for acid reflux, melatonin 3 mg bedtime as needed for insomnia, Zofran 4 mg IV q8h as needed for nausea/vomiting      2/16/25 --> plan was discussed with patient's brother Fernando (over the phone, [phone]) in detail. He agrees with plan. All questions answered. He asked that I update Ester (sister, [phone]); she was called and updated as well, also agrees with the plan, also answered all questions.

## 2025-03-08 NOTE — PROGRESS NOTE ADULT - SUBJECTIVE AND OBJECTIVE BOX
SUBJECTIVE/ OVERNIGHT EVENTS:  febrile overnight  100.9F  IV Zosyn started.  cultures sent  seen in MICU  lethargic.   still with secretions overnight  on hi-flow  RN at bedside.        --------------------------------------------------------------------------------------------  LABS:                        8.3    13.54 )-----------( 144      ( 08 Mar 2025 01:11 )             27.6     03-08    149[H]  |  110[H]  |  67[H]  ----------------------------<  114[H]  4.6   |  25  |  2.40[H]    Ca    10.1      08 Mar 2025 00:16  Phos  3.7     03-08  Mg     2.6     03-08    TPro  7.2  /  Alb  2.9[L]  /  TBili  0.6  /  DBili  x   /  AST  30  /  ALT  31  /  AlkPhos  147[H]  03-08    PT/INR - ( 08 Mar 2025 00:15 )   PT: 12.6 sec;   INR: 1.10 ratio         PTT - ( 08 Mar 2025 00:15 )  PTT:50.6 sec  CAPILLARY BLOOD GLUCOSE      POCT Blood Glucose.: 115 mg/dL (08 Mar 2025 05:36)  POCT Blood Glucose.: 124 mg/dL (07 Mar 2025 17:43)  POCT Blood Glucose.: 123 mg/dL (07 Mar 2025 12:03)        Urinalysis Basic - ( 08 Mar 2025 00:16 )    Color: x / Appearance: x / SG: x / pH: x  Gluc: 114 mg/dL / Ketone: x  / Bili: x / Urobili: x   Blood: x / Protein: x / Nitrite: x   Leuk Esterase: x / RBC: x / WBC x   Sq Epi: x / Non Sq Epi: x / Bacteria: x        RADIOLOGY & ADDITIONAL TESTS:    Imaging Personally Reviewed:  [x] YES  [ ] NO    Consultant(s) Notes Reviewed:  [x] YES  [ ] NO    MEDICATIONS  (STANDING):  albuterol/ipratropium for Nebulization 3 milliLiter(s) Nebulizer every 6 hours  amLODIPine   Tablet 5 milliGRAM(s) Oral daily  atorvastatin 20 milliGRAM(s) Oral at bedtime  cholecalciferol 1000 Unit(s) Oral daily  erythromycin   Ointment 1 Application(s) Both EYES four times a day  escitalopram 15 milliGRAM(s) Oral with breakfast  gabapentin Solution 150 milliGRAM(s) Oral every 12 hours  heparin  Infusion. 1700 Unit(s)/Hr (17 mL/Hr) IV Continuous <Continuous>  influenza  Vaccine (HIGH DOSE) 0.5 milliLiter(s) IntraMuscular once  lacosamide IVPB 200 milliGRAM(s) IV Intermittent every 12 hours  lamoTRIgine 50 milliGRAM(s) Oral two times a day  lanolin Ointment 1 Application(s) Topical daily  levETIRAcetam   Injectable 750 milliGRAM(s) IV Push every 12 hours  lurasidone 20 milliGRAM(s) Oral at bedtime  metoprolol tartrate 12.5 milliGRAM(s) Oral two times a day  nystatin Powder 1 Application(s) Topical two times a day  ofloxacin 0.3% Solution 1 Drop(s) Right EYE every 4 hours  pantoprazole  Injectable 40 milliGRAM(s) IV Push daily  piperacillin/tazobactam IVPB.- 3.375 Gram(s) IV Intermittent once  piperacillin/tazobactam IVPB.. 3.375 Gram(s) IV Intermittent every 8 hours  polyethylene glycol 3350 17 Gram(s) Oral daily  senna Syrup 10 milliLiter(s) Oral at bedtime  sevelamer carbonate Powder 1600 milliGRAM(s) Oral every 8 hours  sodium chloride 3%  Inhalation 4 milliLiter(s) Inhalation every 6 hours    MEDICATIONS  (PRN):  artificial tears (preservative free) Ophthalmic Solution 1 Drop(s) Both EYES every 6 hours PRN Dry Eyes  tranexamic acid Injectable for Topical Use 5 milliLiter(s) Topical once PRN Excessive Lip Bleeding      Care Discussed with Consultants/Other Providers [x] YES  [ ] NO    Vital Signs Last 24 Hrs  T(C): 37.8 (08 Mar 2025 00:00), Max: 38.3 (07 Mar 2025 21:00)  T(F): 100.1 (08 Mar 2025 00:00), Max: 100.9 (07 Mar 2025 21:00)  HR: 91 (08 Mar 2025 07:00) (91 - 120)  BP: 109/57 (08 Mar 2025 07:00) (109/57 - 195/92)  BP(mean): 80 (08 Mar 2025 07:00) (80 - 132)  RR: 39 (08 Mar 2025 07:00) (20 - 67)  SpO2: 99% (08 Mar 2025 07:00) (83% - 100%)    Parameters below as of 08 Mar 2025 05:17  Patient On (Oxygen Delivery Method): nasal cannula, high flow      I&O's Summary    07 Mar 2025 07:01  -  08 Mar 2025 07:00  --------------------------------------------------------  IN: 1937 mL / OUT: 0 mL / NET: 1937 mL        PHYSICAL EXAM:  GENERAL: comfortable, awake, on hi-flow  HEAD:  Atraumatic, Normocephalic  EYES: EOMI, PERRLA, conjunctiva and sclera clear  NECK: Supple, No JVD  CHEST/LUNG: mild decrease breath sounds bilaterally; No wheeze   HEART: Regular rate and rhythm; No murmurs, rubs, or gallops  ABDOMEN: Soft, Nontender, Nondistended; Bowel sounds present  Neuro: lethargic.   EXTREMITIES:  2+ Peripheral Pulses, No clubbing, cyanosis, or edema  SKIN: No rashes or lesions

## 2025-03-08 NOTE — PROGRESS NOTE ADULT - ASSESSMENT
Patient is a 69 year old male with PMH of HTN, HLD, VAZQUEZ (on CPAP at bedtime, NC 2 liters during the day), CKD3, epilepsy, schizophrenia, developmental delay, metastatic testicular cancer (s/p orchiectomy, actively on chemotherapy, last dose of etoposide/cisplatin on 6/2024) presenting with worsening shortness of breath. Patient was recently hospitalized at [facility] from January 27th 2025 to February 6th 2025 for seizure and influenza virus infection, which required intubation. He was sent to rehab (originally from home) from hospital and now returns due to sudden onset shortness of breath and worsening oxygenation. Of note, he tested positive for COVID-19 at facility on 2/13/25 and was not put on any COVID-19 treatment at the time, only guaifenesin and scopolamine patch. Patient was placed on non-rebreather and sent to the hospital on 2/16/25. Noted initial discussion with patient/family and they decided to hold off on remdesivir given LFTs and SHAGUFTA.     Acute on chronic hypoxic respiratory failure  COVID-19 pneumonia, superimposed bacterial pneumonia  Acute on chronic HFpEF  Klebsiella UTI, treated   SHAGUFTA on CKD  Severe sepsis, hypotension, SHAGUFTA, likely due to aspiration pneumonia, treated  Now with fever with copious secretions post extubation - likely due to aspiration  - CT chest with extensive b/l ggo c/w COVID pneumonia; tracheomalacia   - MRSA PCR screen negative, s/p vancomycin   - 2/18 worsening resp status on NC requiring AVAPS, started on remdesivir   - 2/22 completed remdesivir x5d course   - 2/23 s/p RRT for inc WOB, s/p intubation, suspected aspiration   - 2/24 intubated, on sedation, pressor support, SHAGUFTA, WBC wnl   - 2/25 afebrile, off pressor, WBC wnl, Cr stable  - 2/26 CT chest with improved upper lobe ggo, new/worsening confluent areas of consolidation in mid-lower lungs   - 2/26 completed dexamethasone x10d  - 2/28 completed zosyn   - 3/4 s/p extubation, now on NC, copious secretions requiring frequent suctioning   - 3/5 started on erythromycin for conjunctivitis, culture grew Staph epi  - 3/8 febrile last night, WBC noted, secretions less but still a lot, likely aspirating-started on zosyn     Recommendations:  Follow Bcx, in process  Follow Scx, gram stain noted   Continue on zosyn   Continue on erythromycin  Can trial acyclovir ointment to upper lip lesion  Nephrology following, monitor Cr-improving   Monitor temps/WBC  Supportive care  Aspiration precautions  Continue rest of care per primary team       Greyson Mart M.D.  [facility]  Available on [employer] TEAMS - *PREFERRED*  [phone]  After 5pm on weekdays and all day on weekends - please call [phone]     Thank you for consulting us and involving us in the management of this patients case. In addition to reviewing history, imaging, documents, labs, microbiology, took into account antibiotic stewardship, local antibiogram and infection control strategies and potential transmission issues.

## 2025-03-08 NOTE — AIRWAY PLACEMENT NOTE ADULT - INDICATIONS:
Route for mechanical ventilation
Route for mechanical ventilation
Route for mechanical ventilation/Respiratory distress

## 2025-03-08 NOTE — CHART NOTE - NSCHARTNOTEFT_GEN_A_CORE
:  Kin Garcia Fellow    INDICATION:    [x] Acute Hypoxic Respiratory failure    [ ] Other:       PROCEDURE:    [x] LIMITED CHEST    [ ] LIMITED ABDOMINAL    [ ] OTHER      FINDINGS:  Pulmonary:   Left: Dense B lines throughout. consolidation at the base. elevated hemidiaphragm.  No pleural effusion.  Right: Dense B lines throughout. No pleural effusion.    INTERPRETATION:  Dense Bilateral B lines throughout.  Left sided consolidation at the base. elevated hemidiaphragm.     Images stored on [place] :  Kin Garcia Fellow    INDICATION:    [x] Acute Hypoxic Respiratory failure    [ ] Other:       PROCEDURE:    [x] LIMITED CHEST    [ ] LIMITED ABDOMINAL    [ ] OTHER      FINDINGS:  Pulmonary:   Left: Dense B lines throughout. consolidation at the base. elevated hemidiaphragm.  No pleural effusion.  Right: Dense B lines throughout. No pleural effusion.    INTERPRETATION:  Dense Bilateral B lines throughout.  Left sided consolidation at the base. elevated hemidiaphragm.     Images stored on [employer]    Attending attestation:  Left sided consolidation with associated alveolar filling process bilaterally

## 2025-03-08 NOTE — AIRWAY PLACEMENT NOTE ADULT - AIRWAY COMMENTS:
Received patient intubated in MICU.
Patient was intubated due to XXXXX, with size X ETT, secured with ET tube wolf at XX cm at lip line. ETT was confirmed with ETCO2 color change and equal bilateral breaths sounds noted upon auscultation by MD at bedside. Patient is placed on mechanical ventilation on AC/VC mode with settings as documented on flow sheet as per MD's order post intubation, tolerating well. No signs of respiratory distress noted. Will continue to monitor.
Pt intubated for acute respiratory failure and hypoxemia. Class 1 mouth opening. Grade 1 view. No complications upon intubation. Anesthesia present at bedside. RSI medications given by anesthesia. Pt to be transported to ICU and placed on mechanical ventilation.

## 2025-03-08 NOTE — PROGRESS NOTE ADULT - ATTENDING COMMENTS
68 yo M PMH HTN, HLD, VAZQUEZ, CKD, epilepsy, schizophrenia, developmental delay, metastatic testicular ca s/p orchiectomy and currently on chemotherapy presenting with AHRF 2/2 covid & bacterial pneumonia     - tmax 101 with increased secretions  - f/u infectious workup  - c/w suction and airway clearance  - reintubated today for increased WOB and secretions  - plan for tracheostomy later this week given recurrent intubations  - AC for afib  - c/w AEDs

## 2025-03-08 NOTE — PROCEDURE NOTE - NSTRACHPOSTINTU_RESP_A_CORE
Breath sounds bilateral/Breath sounds equal/Chest excursion noted/Chest X-Ray/Positive end tidal Co2 noted
Appropriate capnography/Breath sounds bilateral/Breath sounds equal/Chest excursion noted/Chest X-Ray

## 2025-03-08 NOTE — PROCEDURE NOTE - NSBRONCHFINDINGS_GEN_A_CORE_FT
Trachea: copious secretions  Main guero:   Sharp  Left Mainstem Bronchus: Covered by thick white mucous plug which was aspirated  MADINA: erythematous mucosa with scant bleeding  LLL: Clear   Right Mainstem Bronchus: Moderate thick white-yellow secretions  RUL: Clear   Rt bronchus intermedius: Small amount of thick secretions.   RML: scant secretions.   RLL: Scant secretions.   SPECIMENS: BAL    COMPLICATIONS:   None  IMPRESSION:   Bronchoscopy : Left mainstem mucous plug. Thick secretions.   PLAN:  Sent BAL for culture

## 2025-03-08 NOTE — PROGRESS NOTE ADULT - NSPROGADDITIONALINFOA_GEN_ALL_CORE
speech & swallow eval appreciated, s/p FEES: recommends thicken pureed diet.  events reviewed, RRT for hypoxia, intubated/sedated for increased work of breathing.   transferred to ICU. ICU eval appreciated  GOC discussed, full code per the sister.   Bumex gtt for overload, now off. weaned off pressors.   completed abx, completed antiviral.   ICU care appreciated.   vEEG per ICU team: no seizures.  s/p 1 unit prbc overnight, monitor hgb.   extubated 3/4/25.  on NC.  physical therapy. monitor cough. PRN antitussives.   daily xrays.  abx if suspicion of recurrent PNA, if febrile.   frequent suctioning for secretions.   100.9F, IV Zosyn restarted. cultures sent. ID f/u.     - Dr. SILVA Htet ([facility])  - [phone]

## 2025-03-08 NOTE — PROGRESS NOTE ADULT - PROBLEM SELECTOR PLAN 1
? superimposed PNA on top of COVID-19?  - Zosyn 4.5 g q12h, renally dosed, completed  - Vancomycin discontinued  - Decadron 6mg IVPB daily x 10 days for COVID-19   - completed Remdesivir IV   - Maintain O2 sats above 92%  - BiPaP daytime prn   - BiPaP at bedtime   - Blood cultures x 2 (-) so far  - MRSA swab negative  - Sputum culture --> few gram variable rods and rare G (+) cocci in pairs  - Legionella Ur Ag (-)  - Streptococcus pneumoniae neg  - Appreciate pulmonology  - s/p bumex gtt per ICU team, monitor urine output  - repeat CT chest reviewed: upper lobe groundglass opacities have improved, there are new and worsening confluent areas of consolidation/pneumonia in the mid to lower lungs  - ID follow up appreciated, completed abx course.  - 100.9F, IV Zosyn restarted. cultures sent.

## 2025-03-08 NOTE — PROCEDURE NOTE - SUPERVISORY STATEMENT
Thick copious secretions noted in LMB and distally in Right lower lobe. Therapeutic aspiration of secretions performed. Follow up cultures

## 2025-03-08 NOTE — PROCEDURE NOTE - NSPROCDETAILS_GEN_ALL_CORE
patient pre-oxygenated, tube inserted, placement confirmed
patient pre-oxygenated, tube inserted, placement confirmed
nasogastric

## 2025-03-08 NOTE — PROGRESS NOTE ADULT - NUTRITIONAL ASSESSMENT
This patient has been assessed with a concern for Malnutrition and has been determined to have a diagnosis/diagnoses of Severe protein-calorie malnutrition.    This patient is being managed with:   Diet NPO-  Except Medications  Entered: Mar  8 2025  3:45AM

## 2025-03-08 NOTE — PROGRESS NOTE ADULT - SUBJECTIVE AND OBJECTIVE BOX
INCOMPLETE    INTERVAL HPI/OVERNIGHT EVENTS:    SUBJECTIVE: Patient seen and examined at bedside.      VITAL SIGNS:  ICU Vital Signs Last 24 Hrs  T(C): 37.8 (08 Mar 2025 00:00), Max: 38.3 (07 Mar 2025 21:00)  T(F): 100.1 (08 Mar 2025 00:00), Max: 100.9 (07 Mar 2025 21:00)  HR: 91 (08 Mar 2025 07:00) (86 - 120)  BP: 109/57 (08 Mar 2025 07:00) (109/57 - 195/92)  BP(mean): 80 (08 Mar 2025 07:00) (80 - 132)  ABP: --  ABP(mean): --  RR: 39 (08 Mar 2025 07:00) (20 - 67)  SpO2: 99% (08 Mar 2025 07:00) (83% - 100%)    O2 Parameters below as of 08 Mar 2025 05:17  Patient On (Oxygen Delivery Method): nasal cannula, high flow            Plateau pressure:   P/F ratio:     03-07 @ 07:01  -  03-08 @ 07:00  --------------------------------------------------------  IN: 1937 mL / OUT: 0 mL / NET: 1937 mL      CAPILLARY BLOOD GLUCOSE      POCT Blood Glucose.: 115 mg/dL (08 Mar 2025 05:36)    I&O's Summary    07 Mar 2025 07:01  -  08 Mar 2025 07:00  --------------------------------------------------------  IN: 1937 mL / OUT: 0 mL / NET: 1937 mL          PHYSICAL EXAM:    General: NAD   HEENT: PERRLA, EOMI, non-icteric  Neck:  symmetric,  JVD absent  Respiratory: Clear to ascultation bilaterally, no crackles/rales, no Resp distress; no accessory muscle use  Cardiovascular:  RRR, no murmurs/rubs/gallops  Abdomen: Soft, NT, ND  Extremities: No edema noted  Skin: No rashes or lesions noted  Neurological: RASS _  Psychiatry: AOx    MEDICATIONS:  MEDICATIONS  (STANDING):  albuterol/ipratropium for Nebulization 3 milliLiter(s) Nebulizer every 6 hours  amLODIPine   Tablet 5 milliGRAM(s) Oral daily  atorvastatin 20 milliGRAM(s) Oral at bedtime  cholecalciferol 1000 Unit(s) Oral daily  erythromycin   Ointment 1 Application(s) Both EYES four times a day  escitalopram 15 milliGRAM(s) Oral with breakfast  gabapentin Solution 150 milliGRAM(s) Oral every 12 hours  heparin  Infusion. 1700 Unit(s)/Hr (17 mL/Hr) IV Continuous <Continuous>  influenza  Vaccine (HIGH DOSE) 0.5 milliLiter(s) IntraMuscular once  lacosamide IVPB 200 milliGRAM(s) IV Intermittent every 12 hours  lamoTRIgine 50 milliGRAM(s) Oral two times a day  lanolin Ointment 1 Application(s) Topical daily  levETIRAcetam   Injectable 750 milliGRAM(s) IV Push every 12 hours  lurasidone 20 milliGRAM(s) Oral at bedtime  metoprolol tartrate 12.5 milliGRAM(s) Oral two times a day  nystatin Powder 1 Application(s) Topical two times a day  ofloxacin 0.3% Solution 1 Drop(s) Right EYE every 4 hours  pantoprazole  Injectable 40 milliGRAM(s) IV Push daily  piperacillin/tazobactam IVPB.- 3.375 Gram(s) IV Intermittent once  piperacillin/tazobactam IVPB.. 3.375 Gram(s) IV Intermittent every 8 hours  polyethylene glycol 3350 17 Gram(s) Oral daily  senna Syrup 10 milliLiter(s) Oral at bedtime  sevelamer carbonate Powder 1600 milliGRAM(s) Oral every 8 hours  sodium chloride 3%  Inhalation 4 milliLiter(s) Inhalation every 6 hours    MEDICATIONS  (PRN):  artificial tears (preservative free) Ophthalmic Solution 1 Drop(s) Both EYES every 6 hours PRN Dry Eyes  tranexamic acid Injectable for Topical Use 5 milliLiter(s) Topical once PRN Excessive Lip Bleeding      ALLERGIES:  Allergies    seasonal allergies (Unknown)  penicillin (Hives)    Intolerances    latex (Rash)      LABS:                        8.3    13.54 )-----------( 144      ( 08 Mar 2025 01:11 )             27.6     03-08    149[H]  |  110[H]  |  67[H]  ----------------------------<  114[H]  4.6   |  25  |  2.40[H]    Ca    10.1      08 Mar 2025 00:16  Phos  3.7     03-08  Mg     2.6     03-08    TPro  7.2  /  Alb  2.9[L]  /  TBili  0.6  /  DBili  x   /  AST  30  /  ALT  31  /  AlkPhos  147[H]  03-08    PT/INR - ( 08 Mar 2025 00:15 )   PT: 12.6 sec;   INR: 1.10 ratio         PTT - ( 08 Mar 2025 00:15 )  PTT:50.6 sec    Urinalysis Basic - ( 08 Mar 2025 00:16 )    Color: x / Appearance: x / SG: x / pH: x  Gluc: 114 mg/dL / Ketone: x  / Bili: x / Urobili: x   Blood: x / Protein: x / Nitrite: x   Leuk Esterase: x / RBC: x / WBC x   Sq Epi: x / Non Sq Epi: x / Bacteria: x          RADIOLOGY & ADDITIONAL TESTS: Reviewed. INTERVAL HPI/OVERNIGHT EVENTS: ovn patient febrile to 101F rectal, with worsening tachypnea RR 40-50. Started on HFNC, cultures drawn, given ofirmev and zosyn. CxR showing opacification of L lung. Patient underwent chest PT and suction with return of copious secretions.    SUBJECTIVE: Patient seen and examined at bedside. Unable to obtain ROS 2/2 mental status       VITAL SIGNS:  ICU Vital Signs Last 24 Hrs  T(C): 37.8 (08 Mar 2025 00:00), Max: 38.3 (07 Mar 2025 21:00)  T(F): 100.1 (08 Mar 2025 00:00), Max: 100.9 (07 Mar 2025 21:00)  HR: 91 (08 Mar 2025 07:00) (86 - 120)  BP: 109/57 (08 Mar 2025 07:00) (109/57 - 195/92)  BP(mean): 80 (08 Mar 2025 07:00) (80 - 132)  ABP: --  ABP(mean): --  RR: 39 (08 Mar 2025 07:00) (20 - 67)  SpO2: 99% (08 Mar 2025 07:00) (83% - 100%)    O2 Parameters below as of 08 Mar 2025 05:17  Patient On (Oxygen Delivery Method): nasal cannula, high flow            Plateau pressure:   P/F ratio:     03-07 @ 07:01  -  03-08 @ 07:00  --------------------------------------------------------  IN: 1937 mL / OUT: 0 mL / NET: 1937 mL      CAPILLARY BLOOD GLUCOSE      POCT Blood Glucose.: 115 mg/dL (08 Mar 2025 05:36)    I&O's Summary    07 Mar 2025 07:01  -  08 Mar 2025 07:00  --------------------------------------------------------  IN: 1937 mL / OUT: 0 mL / NET: 1937 mL          PHYSICAL EXAM:    General: lethargic  HEENT: non-icteric  Neck:  symmetric  Respiratory: Coarse breath sounds bilaterally with new squeaking on L. moderate Resp distress; signifcant accessory muscle use  Cardiovascular:  RRR  Abdomen: Soft, NT, ND  Extremities: No edema noted  Skin: No rashes or lesions noted  Neurological: RASS -1  Psychiatry: unable to assess     MEDICATIONS:  MEDICATIONS  (STANDING):  albuterol/ipratropium for Nebulization 3 milliLiter(s) Nebulizer every 6 hours  amLODIPine   Tablet 5 milliGRAM(s) Oral daily  atorvastatin 20 milliGRAM(s) Oral at bedtime  cholecalciferol 1000 Unit(s) Oral daily  erythromycin   Ointment 1 Application(s) Both EYES four times a day  escitalopram 15 milliGRAM(s) Oral with breakfast  gabapentin Solution 150 milliGRAM(s) Oral every 12 hours  heparin  Infusion. 1700 Unit(s)/Hr (17 mL/Hr) IV Continuous <Continuous>  influenza  Vaccine (HIGH DOSE) 0.5 milliLiter(s) IntraMuscular once  lacosamide IVPB 200 milliGRAM(s) IV Intermittent every 12 hours  lamoTRIgine 50 milliGRAM(s) Oral two times a day  lanolin Ointment 1 Application(s) Topical daily  levETIRAcetam   Injectable 750 milliGRAM(s) IV Push every 12 hours  lurasidone 20 milliGRAM(s) Oral at bedtime  metoprolol tartrate 12.5 milliGRAM(s) Oral two times a day  nystatin Powder 1 Application(s) Topical two times a day  ofloxacin 0.3% Solution 1 Drop(s) Right EYE every 4 hours  pantoprazole  Injectable 40 milliGRAM(s) IV Push daily  piperacillin/tazobactam IVPB.- 3.375 Gram(s) IV Intermittent once  piperacillin/tazobactam IVPB.. 3.375 Gram(s) IV Intermittent every 8 hours  polyethylene glycol 3350 17 Gram(s) Oral daily  senna Syrup 10 milliLiter(s) Oral at bedtime  sevelamer carbonate Powder 1600 milliGRAM(s) Oral every 8 hours  sodium chloride 3%  Inhalation 4 milliLiter(s) Inhalation every 6 hours    MEDICATIONS  (PRN):  artificial tears (preservative free) Ophthalmic Solution 1 Drop(s) Both EYES every 6 hours PRN Dry Eyes  tranexamic acid Injectable for Topical Use 5 milliLiter(s) Topical once PRN Excessive Lip Bleeding      ALLERGIES:  Allergies    seasonal allergies (Unknown)  penicillin (Hives)    Intolerances    latex (Rash)      LABS:                        8.3    13.54 )-----------( 144      ( 08 Mar 2025 01:11 )             27.6     03-08    149[H]  |  110[H]  |  67[H]  ----------------------------<  114[H]  4.6   |  25  |  2.40[H]    Ca    10.1      08 Mar 2025 00:16  Phos  3.7     03-08  Mg     2.6     03-08    TPro  7.2  /  Alb  2.9[L]  /  TBili  0.6  /  DBili  x   /  AST  30  /  ALT  31  /  AlkPhos  147[H]  03-08    PT/INR - ( 08 Mar 2025 00:15 )   PT: 12.6 sec;   INR: 1.10 ratio         PTT - ( 08 Mar 2025 00:15 )  PTT:50.6 sec    Urinalysis Basic - ( 08 Mar 2025 00:16 )    Color: x / Appearance: x / SG: x / pH: x  Gluc: 114 mg/dL / Ketone: x  / Bili: x / Urobili: x   Blood: x / Protein: x / Nitrite: x   Leuk Esterase: x / RBC: x / WBC x   Sq Epi: x / Non Sq Epi: x / Bacteria: x          RADIOLOGY & ADDITIONAL TESTS: Reviewed.

## 2025-03-08 NOTE — PROGRESS NOTE ADULT - PROBLEM SELECTOR PLAN 3
refaxed pt's hereditary cancer genetics pedigree to [facility] per [facility]'s request   Has a history of CKD stage 3, Cr 2.38 at baseline. Presents with Cr 3.27. Likely pre-renal.   - Renal US no hydronephrosis   - Fe Urea = 29%  - Monitor Cr daily   - Avoid nephrotoxins, dose medication to GFR  - Appreciate nephrology  - bumex gtt now off.

## 2025-03-09 LAB
-  AZTREONAM: SIGNIFICANT CHANGE UP
-  CEFEPIME: SIGNIFICANT CHANGE UP
-  CEFTAZIDIME: SIGNIFICANT CHANGE UP
-  CIPROFLOXACIN: SIGNIFICANT CHANGE UP
-  CLINDAMYCIN: SIGNIFICANT CHANGE UP
-  ERYTHROMYCIN: SIGNIFICANT CHANGE UP
-  GENTAMICIN: SIGNIFICANT CHANGE UP
-  IMIPENEM: SIGNIFICANT CHANGE UP
-  LEVOFLOXACIN: SIGNIFICANT CHANGE UP
-  MEROPENEM: SIGNIFICANT CHANGE UP
-  OXACILLIN: SIGNIFICANT CHANGE UP
-  PENICILLIN: SIGNIFICANT CHANGE UP
-  PIPERACILLIN/TAZOBACTAM: SIGNIFICANT CHANGE UP
-  RIFAMPIN: SIGNIFICANT CHANGE UP
-  TETRACYCLINE: SIGNIFICANT CHANGE UP
-  TRIMETHOPRIM/SULFAMETHOXAZOLE: SIGNIFICANT CHANGE UP
-  VANCOMYCIN: SIGNIFICANT CHANGE UP
ALBUMIN SERPL ELPH-MCNC: 2.7 G/DL — LOW (ref 3.3–5)
ALP SERPL-CCNC: 131 U/L — HIGH (ref 40–120)
ALT FLD-CCNC: 24 U/L — SIGNIFICANT CHANGE UP (ref 10–45)
ANION GAP SERPL CALC-SCNC: 16 MMOL/L — SIGNIFICANT CHANGE UP (ref 5–17)
APPEARANCE UR: ABNORMAL
APTT BLD: 58.3 SEC — HIGH (ref 24.5–35.6)
AST SERPL-CCNC: 27 U/L — SIGNIFICANT CHANGE UP (ref 10–40)
BACTERIA # UR AUTO: ABNORMAL /HPF
BASOPHILS # BLD AUTO: 0.05 K/UL — SIGNIFICANT CHANGE UP (ref 0–0.2)
BASOPHILS NFR BLD AUTO: 0.4 % — SIGNIFICANT CHANGE UP (ref 0–2)
BILIRUB SERPL-MCNC: 0.6 MG/DL — SIGNIFICANT CHANGE UP (ref 0.2–1.2)
BILIRUB UR-MCNC: NEGATIVE — SIGNIFICANT CHANGE UP
BUN SERPL-MCNC: 63 MG/DL — HIGH (ref 7–23)
CALCIUM SERPL-MCNC: 10.1 MG/DL — SIGNIFICANT CHANGE UP (ref 8.4–10.5)
CAST: 3 /LPF — SIGNIFICANT CHANGE UP (ref 0–4)
CHLORIDE SERPL-SCNC: 108 MMOL/L — SIGNIFICANT CHANGE UP (ref 96–108)
CO2 SERPL-SCNC: 23 MMOL/L — SIGNIFICANT CHANGE UP (ref 22–31)
COLOR SPEC: YELLOW — SIGNIFICANT CHANGE UP
CREAT SERPL-MCNC: 2.92 MG/DL — HIGH (ref 0.5–1.3)
CULTURE RESULTS: ABNORMAL
DIFF PNL FLD: ABNORMAL
EGFR: 23 ML/MIN/1.73M2 — LOW
EGFR: 23 ML/MIN/1.73M2 — LOW
EOSINOPHIL # BLD AUTO: 0.72 K/UL — HIGH (ref 0–0.5)
EOSINOPHIL NFR BLD AUTO: 6.1 % — HIGH (ref 0–6)
GAS PNL BLDA: SIGNIFICANT CHANGE UP
GAS PNL BLDV: SIGNIFICANT CHANGE UP
GLUCOSE SERPL-MCNC: 107 MG/DL — HIGH (ref 70–99)
GLUCOSE UR QL: NEGATIVE MG/DL — SIGNIFICANT CHANGE UP
GRAM STN FLD: ABNORMAL
HCT VFR BLD CALC: 25.4 % — LOW (ref 39–50)
HGB BLD-MCNC: 7.7 G/DL — LOW (ref 13–17)
IMM GRANULOCYTES NFR BLD AUTO: 1.4 % — HIGH (ref 0–0.9)
INR BLD: 1.2 RATIO — HIGH (ref 0.85–1.16)
KETONES UR-MCNC: NEGATIVE MG/DL — SIGNIFICANT CHANGE UP
LEUKOCYTE ESTERASE UR-ACNC: ABNORMAL
LYMPHOCYTES # BLD AUTO: 1.16 K/UL — SIGNIFICANT CHANGE UP (ref 1–3.3)
LYMPHOCYTES # BLD AUTO: 9.8 % — LOW (ref 13–44)
MAGNESIUM SERPL-MCNC: 2.6 MG/DL — SIGNIFICANT CHANGE UP (ref 1.6–2.6)
MCHC RBC-ENTMCNC: 30.3 G/DL — LOW (ref 32–36)
MCHC RBC-ENTMCNC: 32.5 PG — SIGNIFICANT CHANGE UP (ref 27–34)
MCV RBC AUTO: 107.2 FL — HIGH (ref 80–100)
METHOD TYPE: SIGNIFICANT CHANGE UP
METHOD TYPE: SIGNIFICANT CHANGE UP
MONOCYTES # BLD AUTO: 0.69 K/UL — SIGNIFICANT CHANGE UP (ref 0–0.9)
MONOCYTES NFR BLD AUTO: 5.8 % — SIGNIFICANT CHANGE UP (ref 2–14)
NEUTROPHILS # BLD AUTO: 9.04 K/UL — HIGH (ref 1.8–7.4)
NEUTROPHILS NFR BLD AUTO: 76.5 % — SIGNIFICANT CHANGE UP (ref 43–77)
NITRITE UR-MCNC: NEGATIVE — SIGNIFICANT CHANGE UP
NRBC BLD AUTO-RTO: 0 /100 WBCS — SIGNIFICANT CHANGE UP (ref 0–0)
ORGANISM # SPEC MICROSCOPIC CNT: ABNORMAL
ORGANISM # SPEC MICROSCOPIC CNT: ABNORMAL
PH UR: 5.5 — SIGNIFICANT CHANGE UP (ref 5–8)
PHOSPHATE SERPL-MCNC: 4.9 MG/DL — HIGH (ref 2.5–4.5)
PLATELET # BLD AUTO: 157 K/UL — SIGNIFICANT CHANGE UP (ref 150–400)
POTASSIUM SERPL-MCNC: 4.2 MMOL/L — SIGNIFICANT CHANGE UP (ref 3.5–5.3)
POTASSIUM SERPL-SCNC: 4.2 MMOL/L — SIGNIFICANT CHANGE UP (ref 3.5–5.3)
PROT SERPL-MCNC: 6.8 G/DL — SIGNIFICANT CHANGE UP (ref 6–8.3)
PROT UR-MCNC: 100 MG/DL
PROTHROM AB SERPL-ACNC: 13.6 SEC — HIGH (ref 9.9–13.4)
RBC # BLD: 2.37 M/UL — LOW (ref 4.2–5.8)
RBC # FLD: 15.1 % — HIGH (ref 10.3–14.5)
RBC CASTS # UR COMP ASSIST: 24 /HPF — HIGH (ref 0–4)
REVIEW: SIGNIFICANT CHANGE UP
SODIUM SERPL-SCNC: 147 MMOL/L — HIGH (ref 135–145)
SP GR SPEC: 1.01 — SIGNIFICANT CHANGE UP (ref 1–1.03)
SPECIMEN SOURCE: SIGNIFICANT CHANGE UP
SPECIMEN SOURCE: SIGNIFICANT CHANGE UP
SQUAMOUS # UR AUTO: 3 /HPF — SIGNIFICANT CHANGE UP (ref 0–5)
UROBILINOGEN FLD QL: 0.2 MG/DL — SIGNIFICANT CHANGE UP (ref 0.2–1)
WBC # BLD: 11.82 K/UL — HIGH (ref 3.8–10.5)
WBC # FLD AUTO: 11.82 K/UL — HIGH (ref 3.8–10.5)
WBC UR QL: 269 /HPF — HIGH (ref 0–5)

## 2025-03-09 PROCEDURE — 99291 CRITICAL CARE FIRST HOUR: CPT

## 2025-03-09 RX ORDER — DEXMEDETOMIDINE HYDROCHLORIDE IN SODIUM CHLORIDE 4 UG/ML
0.2 INJECTION INTRAVENOUS
Qty: 200 | Refills: 0 | Status: DISCONTINUED | OUTPATIENT
Start: 2025-03-09 | End: 2025-03-13

## 2025-03-09 RX ADMIN — ESCITALOPRAM OXALATE 15 MILLIGRAM(S): 20 TABLET ORAL at 11:54

## 2025-03-09 RX ADMIN — ERYTHROMYCIN 1 APPLICATION(S): 5 OINTMENT OPHTHALMIC at 11:55

## 2025-03-09 RX ADMIN — LAMOTRIGINE 50 MILLIGRAM(S): 150 TABLET ORAL at 17:27

## 2025-03-09 RX ADMIN — LAMOTRIGINE 50 MILLIGRAM(S): 150 TABLET ORAL at 05:36

## 2025-03-09 RX ADMIN — NYSTATIN 1 APPLICATION(S): 100000 CREAM TOPICAL at 17:35

## 2025-03-09 RX ADMIN — GABAPENTIN 150 MILLIGRAM(S): 400 CAPSULE ORAL at 17:35

## 2025-03-09 RX ADMIN — METOPROLOL SUCCINATE 12.5 MILLIGRAM(S): 50 TABLET, EXTENDED RELEASE ORAL at 05:36

## 2025-03-09 RX ADMIN — OFLOXACIN 1 DROP(S): 3 SOLUTION OPHTHALMIC at 14:38

## 2025-03-09 RX ADMIN — OFLOXACIN 1 DROP(S): 3 SOLUTION OPHTHALMIC at 01:19

## 2025-03-09 RX ADMIN — OFLOXACIN 1 DROP(S): 3 SOLUTION OPHTHALMIC at 09:09

## 2025-03-09 RX ADMIN — Medication 4 MILLILITER(S): at 23:39

## 2025-03-09 RX ADMIN — IPRATROPIUM BROMIDE AND ALBUTEROL SULFATE 3 MILLILITER(S): .5; 2.5 SOLUTION RESPIRATORY (INHALATION) at 11:29

## 2025-03-09 RX ADMIN — LACOSAMIDE 140 MILLIGRAM(S): 150 TABLET, FILM COATED ORAL at 05:36

## 2025-03-09 RX ADMIN — POLYETHYLENE GLYCOL 3350 17 GRAM(S): 17 POWDER, FOR SOLUTION ORAL at 11:54

## 2025-03-09 RX ADMIN — LEVETIRACETAM 750 MILLIGRAM(S): 10 INJECTION, SOLUTION INTRAVENOUS at 05:42

## 2025-03-09 RX ADMIN — LEVETIRACETAM 750 MILLIGRAM(S): 10 INJECTION, SOLUTION INTRAVENOUS at 17:34

## 2025-03-09 RX ADMIN — IPRATROPIUM BROMIDE AND ALBUTEROL SULFATE 3 MILLILITER(S): .5; 2.5 SOLUTION RESPIRATORY (INHALATION) at 05:13

## 2025-03-09 RX ADMIN — IPRATROPIUM BROMIDE AND ALBUTEROL SULFATE 3 MILLILITER(S): .5; 2.5 SOLUTION RESPIRATORY (INHALATION) at 17:04

## 2025-03-09 RX ADMIN — Medication 1000 UNIT(S): at 11:56

## 2025-03-09 RX ADMIN — OFLOXACIN 1 DROP(S): 3 SOLUTION OPHTHALMIC at 17:35

## 2025-03-09 RX ADMIN — Medication 15 MILLILITER(S): at 05:36

## 2025-03-09 RX ADMIN — SEVELAMER HYDROCHLORIDE 1600 MILLIGRAM(S): 800 TABLET ORAL at 13:47

## 2025-03-09 RX ADMIN — Medication 1 APPLICATION(S): at 12:37

## 2025-03-09 RX ADMIN — ATORVASTATIN CALCIUM 20 MILLIGRAM(S): 80 TABLET, FILM COATED ORAL at 22:26

## 2025-03-09 RX ADMIN — Medication 25 GRAM(S): at 17:27

## 2025-03-09 RX ADMIN — HEPARIN SODIUM 1900 UNIT(S)/HR: 1000 INJECTION INTRAVENOUS; SUBCUTANEOUS at 22:27

## 2025-03-09 RX ADMIN — Medication 4 MILLILITER(S): at 17:04

## 2025-03-09 RX ADMIN — SEVELAMER HYDROCHLORIDE 1600 MILLIGRAM(S): 800 TABLET ORAL at 05:36

## 2025-03-09 RX ADMIN — DEXMEDETOMIDINE HYDROCHLORIDE IN SODIUM CHLORIDE 4.46 MICROGRAM(S)/KG/HR: 4 INJECTION INTRAVENOUS at 22:27

## 2025-03-09 RX ADMIN — Medication 4 MILLILITER(S): at 11:29

## 2025-03-09 RX ADMIN — Medication 25 GRAM(S): at 01:19

## 2025-03-09 RX ADMIN — LURASIDONE HYDROCHLORIDE 20 MILLIGRAM(S): 120 TABLET, FILM COATED ORAL at 22:26

## 2025-03-09 RX ADMIN — NYSTATIN 1 APPLICATION(S): 100000 CREAM TOPICAL at 05:37

## 2025-03-09 RX ADMIN — Medication 10 MILLILITER(S): at 22:26

## 2025-03-09 RX ADMIN — ERYTHROMYCIN 1 APPLICATION(S): 5 OINTMENT OPHTHALMIC at 00:28

## 2025-03-09 RX ADMIN — Medication 15 MILLILITER(S): at 17:27

## 2025-03-09 RX ADMIN — IPRATROPIUM BROMIDE AND ALBUTEROL SULFATE 3 MILLILITER(S): .5; 2.5 SOLUTION RESPIRATORY (INHALATION) at 23:39

## 2025-03-09 RX ADMIN — SEVELAMER HYDROCHLORIDE 1600 MILLIGRAM(S): 800 TABLET ORAL at 22:26

## 2025-03-09 RX ADMIN — HEPARIN SODIUM 1900 UNIT(S)/HR: 1000 INJECTION INTRAVENOUS; SUBCUTANEOUS at 01:20

## 2025-03-09 RX ADMIN — LACOSAMIDE 140 MILLIGRAM(S): 150 TABLET, FILM COATED ORAL at 17:27

## 2025-03-09 RX ADMIN — OFLOXACIN 1 DROP(S): 3 SOLUTION OPHTHALMIC at 05:36

## 2025-03-09 RX ADMIN — Medication 4 MILLILITER(S): at 05:13

## 2025-03-09 RX ADMIN — GABAPENTIN 150 MILLIGRAM(S): 400 CAPSULE ORAL at 07:13

## 2025-03-09 RX ADMIN — ERYTHROMYCIN 1 APPLICATION(S): 5 OINTMENT OPHTHALMIC at 17:27

## 2025-03-09 RX ADMIN — ERYTHROMYCIN 1 APPLICATION(S): 5 OINTMENT OPHTHALMIC at 05:35

## 2025-03-09 RX ADMIN — Medication 40 MILLIGRAM(S): at 11:54

## 2025-03-09 RX ADMIN — Medication 25 GRAM(S): at 10:00

## 2025-03-09 NOTE — PROGRESS NOTE ADULT - SUBJECTIVE AND OBJECTIVE BOX
[facility]   [phone]  RENAL FOLLOW UP NOTE  --------------------------------------------------------------------------------  Chief Complaint:    24 hour events/subjective:    seen earlier   intubated     PAST HISTORY  --------------------------------------------------------------------------------  No significant changes to PMH, PSH, FHx, SHx, unless otherwise noted    ALLERGIES & MEDICATIONS  --------------------------------------------------------------------------------  Allergies    seasonal allergies (Unknown)  No Known Drug Allergies    Intolerances    latex (Rash)    Standing Inpatient Medications  albuterol/ipratropium for Nebulization 3 milliLiter(s) Nebulizer every 6 hours  amLODIPine   Tablet 5 milliGRAM(s) Oral daily  atorvastatin 20 milliGRAM(s) Oral at bedtime  chlorhexidine 0.12% Liquid 15 milliLiter(s) Oral Mucosa every 12 hours  cholecalciferol 1000 Unit(s) Oral daily  dexMEDEtomidine Infusion 0.2 MICROgram(s)/kG/Hr IV Continuous <Continuous>  erythromycin   Ointment 1 Application(s) Both EYES four times a day  escitalopram 15 milliGRAM(s) Oral with breakfast  fentaNYL   Infusion... 0.5 MICROgram(s)/kG/Hr IV Continuous <Continuous>  gabapentin Solution 150 milliGRAM(s) Oral every 12 hours  heparin  Infusion. 1700 Unit(s)/Hr IV Continuous <Continuous>  influenza  Vaccine (HIGH DOSE) 0.5 milliLiter(s) IntraMuscular once  lacosamide IVPB 200 milliGRAM(s) IV Intermittent every 12 hours  lamoTRIgine 50 milliGRAM(s) Oral two times a day  lanolin Ointment 1 Application(s) Topical daily  levETIRAcetam   Injectable 750 milliGRAM(s) IV Push every 12 hours  lurasidone 20 milliGRAM(s) Oral at bedtime  metoprolol tartrate 12.5 milliGRAM(s) Oral two times a day  norepinephrine Infusion 0.05 MICROgram(s)/kG/Min IV Continuous <Continuous>  nystatin Powder 1 Application(s) Topical two times a day  ofloxacin 0.3% Solution 1 Drop(s) Right EYE every 4 hours  pantoprazole  Injectable 40 milliGRAM(s) IV Push daily  piperacillin/tazobactam IVPB.. 3.375 Gram(s) IV Intermittent every 8 hours  polyethylene glycol 3350 17 Gram(s) Oral daily  propofol Infusion 30 MICROgram(s)/kG/Min IV Continuous <Continuous>  senna Syrup 10 milliLiter(s) Oral at bedtime  sevelamer carbonate Powder 1600 milliGRAM(s) Oral every 8 hours  sodium chloride 3%  Inhalation 4 milliLiter(s) Inhalation every 6 hours    PRN Inpatient Medications  artificial tears (preservative free) Ophthalmic Solution 1 Drop(s) Both EYES every 6 hours PRN  tranexamic acid Injectable for Topical Use 5 milliLiter(s) Topical once PRN      REVIEW OF SYSTEMS  --------------------------------------------------------------------------------      VITALS/PHYSICAL EXAM  --------------------------------------------------------------------------------  T(C): 36.8 (03-09-25 @ 12:00), Max: 37.2 (03-08-25 @ 17:00)  HR: 81 (03-09-25 @ 16:08) (67 - 102)  BP: 121/59 (03-09-25 @ 15:30) (74/37 - 161/77)  RR: 21 (03-09-25 @ 15:30) (16 - 47)  SpO2: 100% (03-09-25 @ 16:08) (97% - 100%)  Wt(kg): --        03-08-25 @ 06:01  -  03-09-25 @ 07:00  --------------------------------------------------------  IN: 2130.1 mL / OUT: 590 mL / NET: 1540.1 mL    03-09-25 @ 07:01  -  03-09-25 @ 16:54  --------------------------------------------------------  IN: 607.8 mL / OUT: 290 mL / NET: 317.8 mL      Physical Exam:  	  Gen: ill appearing male, overall lethargic   	Pulm: decrease bs, +coarse  remains tachypneic   	CV: No JVD. RRR, S1S2; no rub  	Abd: +BS, soft, nondistended  	UE: Warm, no cyanosis  no clubbing,  no edema;  	LE: Warm, no cyanosis  no clubbing, no edema    LABS/STUDIES  --------------------------------------------------------------------------------              7.7    11.82 >-----------<  157      [03-09-25 @ 00:27]              25.4     147  |  108  |  63  ----------------------------<  107      [03-09-25 @ 00:27]  4.2   |  23  |  2.92        Ca     10.1     [03-09-25 @ 00:27]      Mg     2.6     [03-09-25 @ 00:27]      Phos  4.9     [03-09-25 @ 00:27]    TPro  6.8  /  Alb  2.7  /  TBili  0.6  /  DBili  x   /  AST  27  /  ALT  24  /  AlkPhos  131  [03-09-25 @ 00:27]    PT/INR: PT 13.6 , INR 1.20       [03-09-25 @ 00:27]  PTT: 58.3       [03-09-25 @ 00:27]      Creatinine Trend:  SCr 2.92 [03-09 @ 00:27]  SCr 2.40 [03-08 @ 00:16]  SCr 2.52 [03-06 @ 23:42]  SCr 2.92 [03-06 @ 00:11]  SCr 3.31 [03-05 @ 00:15]              Ferritin [id number]      [02-23-25 @ 06:15]  TSH 0.87      [01-25-25 @ 11:31]

## 2025-03-09 NOTE — PROGRESS NOTE ADULT - NSPROGADDITIONALINFOA_GEN_ALL_CORE
speech & swallow eval appreciated, s/p FEES: recommends thicken pureed diet.  events reviewed, RRT for hypoxia, intubated/sedated for increased work of breathing.   transferred to ICU. ICU eval appreciated  GOC discussed, full code per the sister.   Bumex gtt for overload, now off. weaned off pressors.   completed abx, completed antiviral.   ICU care appreciated.   vEEG per ICU team: no seizures.  s/p 1 unit prbc overnight, monitor hgb.   extubated 3/4/25.  on NC.  physical therapy. monitor cough. PRN antitussives.   daily xrays.  abx if suspicion of recurrent PNA, if febrile.   frequent suctioning for secretions.   100.9F, IV Zosyn restarted. cultures sent. ID f/u.   re-intubated for increased work of breathing    - Dr. SHIRA Leroy ([facility])  - [phone]

## 2025-03-09 NOTE — PROGRESS NOTE ADULT - ASSESSMENT
Patient is a 69 year old male with PMH of HTN, HLD, VAZQUEZ (on CPAP at bedtime, NC 2 liters during the day), CKD3, epilepsy, schizophrenia, developmental delay, metastatic testicular cancer (s/p orchiectomy, actively on chemotherapy, last dose of etoposide/cisplatin on 6/2024) presenting with worsening shortness of breath. Patient was recently hospitalized at [facility] from January 27th 2025 to February 6th 2025 for seizure and influenza virus infection, which required intubation. He was sent to rehab (originally from home) from hospital and now returns due to sudden onset shortness of breath and worsening oxygenation. Of note, he tested positive for COVID-19 at facility on 2/13/25 and was not put on any COVID-19 treatment at the time, only guaifenesin and scopolamine patch. Patient was placed on non-rebreather and sent to the hospital on 2/16/25. Noted initial discussion with patient/family and they decided to hold off on remdesivir given LFTs and SHAGUFTA.     Acute on chronic hypoxic respiratory failure  COVID-19 pneumonia, superimposed bacterial pneumonia  Acute on chronic HFpEF  Klebsiella UTI, treated   SHAGUFTA on CKD  Severe sepsis, hypotension, SHAGUFTA, likely due to aspiration pneumonia, treated  Now with fever with copious secretions post extubation - Pseudomonas pneumonia   - CT chest with extensive b/l ggo c/w COVID pneumonia; tracheomalacia   - MRSA PCR screen negative, s/p vancomycin   - 2/18 worsening resp status on NC requiring AVAPS, started on remdesivir   - 2/22 completed remdesivir x5d course   - 2/23 s/p RRT for inc WOB, s/p intubation, suspected aspiration   - 2/24 intubated, on sedation, pressor support, SHAGUFTA, WBC wnl   - 2/25 afebrile, off pressor, WBC wnl, Cr stable  - 2/26 CT chest with improved upper lobe ggo, new/worsening confluent areas of consolidation in mid-lower lungs   - 2/26 completed dexamethasone x10d  - 2/28 completed zosyn   - 3/4 s/p extubation, now on NC, copious secretions requiring frequent suctioning   - 3/5 started on erythromycin for conjunctivitis, culture grew Staph epi  - 3/7 febrile, WBC noted, secretions less but still a lot, worsening pulm findings, likely aspirating, started on zosyn    - s/p re-intubation and bronchoscopy with L mainstem mucous plug, thick secretions -send for culture    - 3/7 Scx noted with Pseudomonas aeruginosa (h/o pansensitive Pseudomonas in Scx in 1/2025)    Recommendations:  Follow 3/8 Bcx - NGTD x2    Follow 3/7 Scx for Pseudomonas sensitivities   Follow 3/8 bronch/bal culture, gram stain noted    Continue on zosyn   Continue on erythromycin  Nephrology following, monitor Cr  Monitor temps/WBC  Supportive care  Aspiration precautions  Continue rest of care per primary team       Greyson Mart M.D.  [facility]  Available on [employer] TEAMS - *PREFERRED*  [phone]  After 5pm on weekdays and all day on weekends - please call [phone]     Thank you for consulting us and involving us in the management of this patients case. In addition to reviewing history, imaging, documents, labs, microbiology, took into account antibiotic stewardship, local antibiogram and infection control strategies and potential transmission issues.

## 2025-03-09 NOTE — PROGRESS NOTE ADULT - SUBJECTIVE AND OBJECTIVE BOX
Date of Service: 25 @ 15:35    Patient is a 69y old  Male who presents with a chief complaint of Acute on chronic hypoxemic respiratory failure (09 Mar 2025 08:58)      Any change in ROS: got reintubated resp failure again :  sedated now:     MEDICATIONS  (STANDING):  albuterol/ipratropium for Nebulization 3 milliLiter(s) Nebulizer every 6 hours  amLODIPine   Tablet 5 milliGRAM(s) Oral daily  atorvastatin 20 milliGRAM(s) Oral at bedtime  chlorhexidine 0.12% Liquid 15 milliLiter(s) Oral Mucosa every 12 hours  cholecalciferol 1000 Unit(s) Oral daily  dexMEDEtomidine Infusion 0.2 MICROgram(s)/kG/Hr (4.46 mL/Hr) IV Continuous <Continuous>  erythromycin   Ointment 1 Application(s) Both EYES four times a day  escitalopram 15 milliGRAM(s) Oral with breakfast  fentaNYL   Infusion... 0.5 MICROgram(s)/kG/Hr (2.23 mL/Hr) IV Continuous <Continuous>  gabapentin Solution 150 milliGRAM(s) Oral every 12 hours  heparin  Infusion. 1700 Unit(s)/Hr (17 mL/Hr) IV Continuous <Continuous>  influenza  Vaccine (HIGH DOSE) 0.5 milliLiter(s) IntraMuscular once  lacosamide IVPB 200 milliGRAM(s) IV Intermittent every 12 hours  lamoTRIgine 50 milliGRAM(s) Oral two times a day  lanolin Ointment 1 Application(s) Topical daily  levETIRAcetam   Injectable 750 milliGRAM(s) IV Push every 12 hours  lurasidone 20 milliGRAM(s) Oral at bedtime  metoprolol tartrate 12.5 milliGRAM(s) Oral two times a day  norepinephrine Infusion 0.05 MICROgram(s)/kG/Min (8.35 mL/Hr) IV Continuous <Continuous>  nystatin Powder 1 Application(s) Topical two times a day  ofloxacin 0.3% Solution 1 Drop(s) Right EYE every 4 hours  pantoprazole  Injectable 40 milliGRAM(s) IV Push daily  piperacillin/tazobactam IVPB.. 3.375 Gram(s) IV Intermittent every 8 hours  polyethylene glycol 3350 17 Gram(s) Oral daily  propofol Infusion 30 MICROgram(s)/kG/Min (16 mL/Hr) IV Continuous <Continuous>  senna Syrup 10 milliLiter(s) Oral at bedtime  sevelamer carbonate Powder 1600 milliGRAM(s) Oral every 8 hours  sodium chloride 3%  Inhalation 4 milliLiter(s) Inhalation every 6 hours    MEDICATIONS  (PRN):  artificial tears (preservative free) Ophthalmic Solution 1 Drop(s) Both EYES every 6 hours PRN Dry Eyes  tranexamic acid Injectable for Topical Use 5 milliLiter(s) Topical once PRN Excessive Lip Bleeding    Vital Signs Last 24 Hrs  T(C): 36.8 (09 Mar 2025 12:00), Max: 37.2 (08 Mar 2025 17:00)  T(F): 98.2 (09 Mar 2025 12:00), Max: 98.9 (08 Mar 2025 17:00)  HR: 92 (09 Mar 2025 15:) (67 - 102)  BP: 116/56 (09 Mar 2025 15:00) (74/37 - 161/77)  BP(mean): 79 (09 Mar 2025 15:) (50 - 110)  RR: 23 (09 Mar 2025 15:) (16 - 47)  SpO2: 99% (09 Mar 2025 15:00) (97% - 100%)    Parameters below as of 09 Mar 2025 11:38  Patient On (Oxygen Delivery Method): ventilator      Mode: AC/ CMV (Assist Control/ Continuous Mandatory Ventilation)  RR (machine): 16  TV (machine): 450  FiO2: 60  PEEP: 6  ITime: 0.9  MAP: 10  PIP: 30    I&O's Summary    08 Mar 2025 06:01  -  09 Mar 2025 07:00  --------------------------------------------------------  IN: 2130.1 mL / OUT: 590 mL / NET: 1540.1 mL    09 Mar 2025 07:01  -  09 Mar 2025 15:35  --------------------------------------------------------  IN: 607.8 mL / OUT: 290 mL / NET: 317.8 mL          Physical Exam:   GENERAL: NAD, well-groomed, well-developed  HEENT: BREE/   Atraumatic, Normocephalic  ENMT: No tonsillar erythema, exudates, or enlargement; Moist mucous membranes, Good dentition, No lesions  NECK: Supple, No JVD, Normal thyroid  CHEST/LUNG: Clear to auscultaion  CVS: Regular rate and rhythm; No murmurs, rubs, or gallops  GI: : Soft, Nontender, Nondistended; Bowel sounds present  NERVOUS SYSTEM:  sedated and intubated   EXTREMITIES: - edema  LYMPH: No lymphadenopathy noted  SKIN: No rashes or lesions  ENDOCRINOLOGY: No Thyromegaly  PSYCH: sedated     Labs:  ABG - ( 09 Mar 2025 00:14 )  pH, Arterial: 7.32  pH, Blood: x     /  pCO2: 55    /  pO2: 91    / HCO3: 28    / Base Excess: 1.6   /  SaO2: 97.5            31, 30, 44, 40.0                            7.7    11.82 )-----------( 157      ( 09 Mar 2025 00:27 )             25.4                         8.3    13.54 )-----------( 144      ( 08 Mar 2025 01:11 )             27.6                         3.5    20.24 )-----------( 218      ( 08 Mar 2025 00:16 )             11.6                         8.4    14.83 )-----------( 174      ( 06 Mar 2025 23:42 )             27.7                         8.0    9.52  )-----------( 167      ( 06 Mar 2025 00:18 )             26.1     03-09    147[H]  |  108  |  63[H]  ----------------------------<  107[H]  4.2   |  23  |  2.92[H]  03-08    149[H]  |  110[H]  |  67[H]  ----------------------------<  114[H]  4.6   |  25  |  2.40[H]  03-06    146[H]  |  109[H]  |  85[H]  ----------------------------<  127[H]  4.6   |  25  |  2.52[H]  0306    144  |  106  |  102[H]  ----------------------------<  102[H]  4.8   |  23  |  2.92[H]    Ca    10.1      09 Mar 2025 00:27  Ca    10.1      08 Mar 2025 00:16  Phos  4.9     -  Phos  3.7     03-  Mg     2.6     -  Mg     2.6     03-08    TPro  6.8  /  Alb  2.7[L]  /  TBili  0.6  /  DBili  x   /  AST  27  /  ALT  24  /  AlkPhos  131[H]  -  TPro  7.2  /  Alb  2.9[L]  /  TBili  0.6  /  DBili  x   /  AST  30  /  ALT  31  /  AlkPhos  147[H]    TPro  7.0  /  Alb  3.0[L]  /  TBili  0.4  /  DBili  x   /  AST  29  /  ALT  31  /  AlkPhos  149[H]  0306  TPro  7.2  /  Alb  3.1[L]  /  TBili  0.4  /  DBili  x   /  AST  29  /  ALT  30  /  AlkPhos  130[H]  03-06    CAPILLARY BLOOD GLUCOSE      POCT Blood Glucose.: 123 mg/dL (08 Mar 2025 17:51)      LIVER FUNCTIONS - ( 09 Mar 2025 00:27 )  Alb: 2.7 g/dL / Pro: 6.8 g/dL / ALK PHOS: 131 U/L / ALT: 24 U/L / AST: 27 U/L / GGT: x           PT/INR - ( 09 Mar 2025 00:27 )   PT: 13.6 sec;   INR: 1.20 ratio         PTT - ( 09 Mar 2025 00:27 )  PTT:58.3 sec  Urinalysis Basic - ( 09 Mar 2025 11:17 )    Color: Yellow / Appearance: Cloudy / S.011 / pH: x  Gluc: x / Ketone: Negative mg/dL  / Bili: Negative / Urobili: 0.2 mg/dL   Blood: x / Protein: 100 mg/dL / Nitrite: Negative   Leuk Esterase: Large / RBC: 24 /HPF /  /HPF   Sq Epi: x / Non Sq Epi: 3 /HPF / Bacteria: Few /HPF            RECENT CULTURES:   @ 15:27 Bronchial Bronchial Lavage       Moderate polymorphonuclear leukocytes per low power field  No Squamous epithelial cells per low power field  Moderate Gram Negative Rods per oil power field  Few Gram positive cocci in pairs per oil power field           Moderate Pseudomonas aeruginosa     @ 00:00 Blood Blood       rad< from: Xray Chest 1 View- PORTABLE-Urgent (Xray Chest 1 View- PORTABLE-Urgent .) (25 @ 15:50) >  Heart size cannot be assessed in this projection.  Patchy opacities throughout the left lung are unchanged. Right basilar   atelectasis. Otherwise, the right lung is mostly clear.  No pleural effusion or pneumothorax.    IMPRESSION:  Endotracheal tube is in place with tip above the guero.  Patchy opacities throughout the left lung are unchanged.    --- End of Report ---           AMY THOMAS MD; Resident Radiologist  This document has been electronically signed.  SHARIFA PEREZ MD; Attending Radiologist  This document has been electronically signed. Mar  9 2025  8:35AM    < end of copied text >           No growth at 24 hours     @ 23:30 Blood Blood                No growth at 24 hours     @ 18:42 Sputum Sputum       Rare polymorphonuclear leukocytes per low power field  Few Squamous epithelial cells per low power field  Moderate Gram Negative Rods seen per oil power field           Numerous Pseudomonas aeruginosa  Commensal lucia consistent with body site     @ 16:15 Eye Conjunctiva-Right       No polymorphonuclear cells seen  No organisms seen  by cytocentrifuge           Rare Staphylococcus epidermidis          RESPIRATORY CULTURES:          Studies  Chest X-RAY  CT SCAN Chest   Venous Dopplers: LE:   CT Abdomen  Others

## 2025-03-09 NOTE — PROGRESS NOTE ADULT - ATTENDING COMMENTS
70 yo M PMH HTN, HLD, VAZQUEZ, CKD, epilepsy, schizophrenia, developmental delay, metastatic testicular ca s/p orchiectomy and currently on chemotherapy presenting with AHRF 2/2 covid & bacterial pneumonia     - intubated yesterday for increased WOB. Also with fevers and bronchoscopy showing thick bilateral secretions. Currently on zosyn. Follow up culture data; urinalysis sent due to concerns for purulence from meatus  - c/w suction and airway clearance; currently on 60% fio2  - weaning sedation  - off levo, goal MAP > 65  - plan for tracheostomy later this week given recurrent intubations  - SHAGUFTA overall improving, avoid nephrotoxic agents & renally dose meds. Maintain euvolemia  - mild eosinophilia, continue to trend  - AC for afib, rate control  - c/w AEDs

## 2025-03-09 NOTE — PROGRESS NOTE ADULT - SUBJECTIVE AND OBJECTIVE BOX
[facility]AT[facility] INPATIENT PROGRESS NOTE     Interval Hx:   03-09-25: Mr. Gr was seen at bedside today.    Meds:   MEDICATIONS  (STANDING):  albuterol/ipratropium for Nebulization 3 milliLiter(s) Nebulizer every 6 hours  amLODIPine   Tablet 5 milliGRAM(s) Oral daily  atorvastatin 20 milliGRAM(s) Oral at bedtime  chlorhexidine 0.12% Liquid 15 milliLiter(s) Oral Mucosa every 12 hours  cholecalciferol 1000 Unit(s) Oral daily  erythromycin   Ointment 1 Application(s) Both EYES four times a day  escitalopram 15 milliGRAM(s) Oral with breakfast  fentaNYL   Infusion... 0.5 MICROgram(s)/kG/Hr (2.23 mL/Hr) IV Continuous <Continuous>  gabapentin Solution 150 milliGRAM(s) Oral every 12 hours  heparin  Infusion. 1700 Unit(s)/Hr (17 mL/Hr) IV Continuous <Continuous>  influenza  Vaccine (HIGH DOSE) 0.5 milliLiter(s) IntraMuscular once  lacosamide IVPB 200 milliGRAM(s) IV Intermittent every 12 hours  lamoTRIgine 50 milliGRAM(s) Oral two times a day  lanolin Ointment 1 Application(s) Topical daily  levETIRAcetam   Injectable 750 milliGRAM(s) IV Push every 12 hours  lurasidone 20 milliGRAM(s) Oral at bedtime  metoprolol tartrate 12.5 milliGRAM(s) Oral two times a day  norepinephrine Infusion 0.05 MICROgram(s)/kG/Min (8.35 mL/Hr) IV Continuous <Continuous>  nystatin Powder 1 Application(s) Topical two times a day  ofloxacin 0.3% Solution 1 Drop(s) Right EYE every 4 hours  pantoprazole  Injectable 40 milliGRAM(s) IV Push daily  piperacillin/tazobactam IVPB.. 3.375 Gram(s) IV Intermittent every 8 hours  polyethylene glycol 3350 17 Gram(s) Oral daily  propofol Infusion 30 MICROgram(s)/kG/Min (16 mL/Hr) IV Continuous <Continuous>  senna Syrup 10 milliLiter(s) Oral at bedtime  sevelamer carbonate Powder 1600 milliGRAM(s) Oral every 8 hours  sodium chloride 3%  Inhalation 4 milliLiter(s) Inhalation every 6 hours    MEDICATIONS  (PRN):  artificial tears (preservative free) Ophthalmic Solution 1 Drop(s) Both EYES every 6 hours PRN Dry Eyes  tranexamic acid Injectable for Topical Use 5 milliLiter(s) Topical once PRN Excessive Lip Bleeding    Vital Signs Last 24 Hrs  T(C): 36.8 (09 Mar 2025 00:00), Max: 37.4 (08 Mar 2025 08:00)  T(F): 98.3 (09 Mar 2025 00:00), Max: 99.4 (08 Mar 2025 08:00)  HR: 77 (09 Mar 2025 04:00) (73 - 112)  BP: 125/61 (09 Mar 2025 04:00) (89/52 - 179/86)  BP(mean): 85 (09 Mar 2025 04:00) (65 - 123)  RR: 17 (09 Mar 2025 04:00) (16 - 59)  SpO2: 100% (09 Mar 2025 04:00) (96% - 100%)    Parameters below as of 08 Mar 2025 23:37  Patient On (Oxygen Delivery Method): ventilator    Physical Exam:  Gen: NAD  HEENT: EOMI, MMM  Chest: equal chest rise  Cardiac: regular   Abd: non distended    Labs:                        7.7    11.82 )-----------( 157      ( 09 Mar 2025 00:27 )             25.4     CBC Full  -  ( 09 Mar 2025 00:27 )  WBC Count : 11.82 K/uL  RBC Count : 2.37 M/uL  Hemoglobin : 7.7 g/dL  Hematocrit : 25.4 %  Platelet Count - Automated : 157 K/uL  Mean Cell Volume : 107.2 fl  Mean Cell Hemoglobin : 32.5 pg  Mean Cell Hemoglobin Concentration : 30.3 g/dL    03-09    147[H]  |  108  |  63[H]  ----------------------------<  107[H]  4.2   |  23  |  2.92[H]    Ca    10.1      09 Mar 2025 00:27  Phos  4.9     03-09  Mg     2.6     03-09    TPro  6.8  /  Alb  2.7[L]  /  TBili  0.6  /  DBili  x   /  AST  27  /  ALT  24  /  AlkPhos  131[H]  03-09    PT/INR - ( 09 Mar 2025 00:27 )   PT: 13.6 sec;   INR: 1.20 ratio         PTT - ( 09 Mar 2025 00:27 )  PTT:58.3 sec  Bilirubin Total: 0.6 mg/dL (03-09-25 @ 00:27)   [facility] INPATIENT PROGRESS NOTE     Interval Hx:   03-09-25: Mr. Gr was seen at bedside today, continues in MICU, s/p re-intubation 03/08/2025 given respiratory compromise in the setting of copious secretions as evidenced by bronchoscopy, in setting of fever, now sedated, mild crusting of blood around mouth, without active sign of bleeding, counts noted reviewed, continues on heparin drip     Meds:   MEDICATIONS  (STANDING):  albuterol/ipratropium for Nebulization 3 milliLiter(s) Nebulizer every 6 hours  amLODIPine   Tablet 5 milliGRAM(s) Oral daily  atorvastatin 20 milliGRAM(s) Oral at bedtime  chlorhexidine 0.12% Liquid 15 milliLiter(s) Oral Mucosa every 12 hours  cholecalciferol 1000 Unit(s) Oral daily  erythromycin   Ointment 1 Application(s) Both EYES four times a day  escitalopram 15 milliGRAM(s) Oral with breakfast  fentaNYL   Infusion... 0.5 MICROgram(s)/kG/Hr (2.23 mL/Hr) IV Continuous <Continuous>  gabapentin Solution 150 milliGRAM(s) Oral every 12 hours  heparin  Infusion. 1700 Unit(s)/Hr (17 mL/Hr) IV Continuous <Continuous>  influenza  Vaccine (HIGH DOSE) 0.5 milliLiter(s) IntraMuscular once  lacosamide IVPB 200 milliGRAM(s) IV Intermittent every 12 hours  lamoTRIgine 50 milliGRAM(s) Oral two times a day  lanolin Ointment 1 Application(s) Topical daily  levETIRAcetam   Injectable 750 milliGRAM(s) IV Push every 12 hours  lurasidone 20 milliGRAM(s) Oral at bedtime  metoprolol tartrate 12.5 milliGRAM(s) Oral two times a day  norepinephrine Infusion 0.05 MICROgram(s)/kG/Min (8.35 mL/Hr) IV Continuous <Continuous>  nystatin Powder 1 Application(s) Topical two times a day  ofloxacin 0.3% Solution 1 Drop(s) Right EYE every 4 hours  pantoprazole  Injectable 40 milliGRAM(s) IV Push daily  piperacillin/tazobactam IVPB.. 3.375 Gram(s) IV Intermittent every 8 hours  polyethylene glycol 3350 17 Gram(s) Oral daily  propofol Infusion 30 MICROgram(s)/kG/Min (16 mL/Hr) IV Continuous <Continuous>  senna Syrup 10 milliLiter(s) Oral at bedtime  sevelamer carbonate Powder 1600 milliGRAM(s) Oral every 8 hours  sodium chloride 3%  Inhalation 4 milliLiter(s) Inhalation every 6 hours    MEDICATIONS  (PRN):  artificial tears (preservative free) Ophthalmic Solution 1 Drop(s) Both EYES every 6 hours PRN Dry Eyes  tranexamic acid Injectable for Topical Use 5 milliLiter(s) Topical once PRN Excessive Lip Bleeding    Vital Signs Last 24 Hrs  T(C): 36.8 (09 Mar 2025 00:00), Max: 37.4 (08 Mar 2025 08:00)  T(F): 98.3 (09 Mar 2025 00:00), Max: 99.4 (08 Mar 2025 08:00)  HR: 77 (09 Mar 2025 04:00) (73 - 112)  BP: 125/61 (09 Mar 2025 04:00) (89/52 - 179/86)  BP(mean): 85 (09 Mar 2025 04:00) (65 - 123)  RR: 17 (09 Mar 2025 04:00) (16 - 59)  SpO2: 100% (09 Mar 2025 04:00) (96% - 100%)    Parameters below as of 08 Mar 2025 23:37  Patient On (Oxygen Delivery Method): ventilator    Physical Exam:  Gen: NAD, intubated, sedated   HEENT: EOMI, MMM  Chest: equal chest rise  Cardiac: regular   Abd: non distended    Labs:                        7.7    11.82 )-----------( 157      ( 09 Mar 2025 00:27 )             25.4     CBC Full  -  ( 09 Mar 2025 00:27 )  WBC Count : 11.82 K/uL  RBC Count : 2.37 M/uL  Hemoglobin : 7.7 g/dL  Hematocrit : 25.4 %  Platelet Count - Automated : 157 K/uL  Mean Cell Volume : 107.2 fl  Mean Cell Hemoglobin : 32.5 pg  Mean Cell Hemoglobin Concentration : 30.3 g/dL    03-09    147[H]  |  108  |  63[H]  ----------------------------<  107[H]  4.2   |  23  |  2.92[H]    Ca    10.1      09 Mar 2025 00:27  Phos  4.9     03-09  Mg     2.6     03-09    TPro  6.8  /  Alb  2.7[L]  /  TBili  0.6  /  DBili  x   /  AST  27  /  ALT  24  /  AlkPhos  131[H]  03-09    PT/INR - ( 09 Mar 2025 00:27 )   PT: 13.6 sec;   INR: 1.20 ratio         PTT - ( 09 Mar 2025 00:27 )  PTT:58.3 sec  Bilirubin Total: 0.6 mg/dL (03-09-25 @ 00:27)

## 2025-03-09 NOTE — PROGRESS NOTE ADULT - PROBLEM SELECTOR PLAN 3
-CT chest with b/l GGO, new since 1/30/25. Likely COVID PNA +/- superimposed bacterial PNA  -S/p ABX  -ID f/u.  3/9: on iv zosyn again

## 2025-03-09 NOTE — PROGRESS NOTE ADULT - PROBLEM SELECTOR PLAN 7
Continue with home atorvastatin 20 mg bedtime for HLD, vitamin D supplementation, pantoprazole 40 mg daily, senna 2 tab bedtime, and Miralax 17 gram daily.     General  - DVT prophylaxis: heparin 5000 units q8h --> hep gtt per ICU  - Diet: regular   - Medication reconciliation: complete   - Code: Full   - Medications: Tylenol 650 mg q6h as needed for pain, Maalox 30 mL q4h as needed for acid reflux, melatonin 3 mg bedtime as needed for insomnia, Zofran 4 mg IV q8h as needed for nausea/vomiting      2/16/25 --> plan was discussed with patient's brother Fernando (over the phone, [phone]) in detail. He agrees with plan. All questions answered. He asked that I update Ester (sister, [phone]); she was called and updated as well, also agrees with the plan, also answered all questions.

## 2025-03-09 NOTE — PROGRESS NOTE ADULT - PROBLEM SELECTOR PLAN 1
-Recent admission for acute respiratory failure in the setting of grand mal seizures, influenza, PNA. S/p intubation in MICU, was extubated to [facility]. Now COVID +  -CT chest with b/l GGO, new since 1/30/25. Likely COVID PNA +/- superimposed bacterial PNA.   -Increase in O2 requirements - started on HFNC 2/18  -S/p multiple RRTs for hypoxia, increased WOB in the setting of COVID, PNA, fluid overload. Intubated s/p RRT on 2/23 & transferred to MICU  -Extubated 3/4   -Continue bronchodilators  -Completed ABX   -Still with difficulty managing secretions, GOC discussions ongoing   -Keep O>I as tolerated, s/p Bumex drip   -Keep sats >90% with O2 PRN.  3/9: he got reintubated: now for trach next week

## 2025-03-09 NOTE — PROGRESS NOTE ADULT - ASSESSMENT
69 year old male with a past medical history of hypertension, hyperlipemia VAZQUEZ (on CPAP at bedtime, NC 2 liters during the day), CKD stage 3, epilepsy, schizophrenia, developmental delay, metastatic testicular cancer (s/p orchiectomy, actively on chemotherapy, last dose of etoposide/cisplatin on 6/2024), presenting with acute on chronic hypoxemic respiratory failure, secondary to (a) COVID-19 infection, (b) superimposed pneumonia after recent influenza infection, and/or (c) fluid overload. MICU consulted and accepted after RRT due to increased work of breathing.    NEURO:  #Mental status:  -Worsening mental status 3/8, likely iso respiratory distress  -pending sedation and intubation    #Epilepsy:  - Continue with home meds which include Lacosamide, Lurasidone, Lamotrigine - increased to 50 BID  - EEG negative    #Brain Mets:  - MRI brain now with 5.4 mm enhancing lesion in the LEFT middle cerebellar peduncle with associated edema suspicious for metastases    # Schizophrenia  - Lurasidone halved to reduce oversedation    CV:  #Echo:  Recent echo last admission showing Left ventricular cavity is normal in size. Left ventricular systolic function is normal with an ejection fraction of 62 %. There are no regional wall motion abnormalities seen. Normal right ventricular cavity size and normal right ventricular systolic function.   - Repeat echo continuing to show EF 70%    #Afib w/RVR  - New onset Afib RVR (on tele, confirmed with EKG 2/19)   - Plan: Started metoprolol 5mg IVP q6 hours & Hep gtt -> metoprolol restarted  - If rate remains uncontrolled, can start Cardizem drip at 5mg/h  - c/w Heparin drip    #HTN:  - On Hydralazine 25mg TID, Amlodipine 5mg  -amlodipine 5 restarted 3/7 given hypertension    PULM:  #Vent   - Extubated 3/4  -Likely reintubated on 3/8    #AHRF  - Patient admitted to the hospital for AHRF, found to be COVID (+)  - Likely 2/2 PNA, does not appear to CHF decompensation  -worsening secretions and consolidations on 3/8, iso fever/leukocytosis may be new PNA. Management as below    RENAL:  #SHAGUFTA: - Resolving  - Hx of CKD stage 3, Cr 2.38 at baseline, peak at 4.24  - In setting of COVID (+)  - Pre-renal, FeUrea 29%  - Monitor Cr  - Avoid nephrotoxic meds  - Wakefield in place  -appears to be near baseline at 3/7    GI:  No acute issues     #Diet: Tube feeds via OG tube  #Bowel Regimen  - On Senna and Miralax    ENDO:  No acute issues  Thyroid nodule noted in prior notes    HEMATOLOGIC:  #Thrombocytopenic  - Unclear origin  - Concern for possible mets to bone marrow? vs due to infection  - Continue to monitor  - Transfuse to maintain Plt>10K unless actively bleeding then maintain >50K  - Heme-Onc recs appreciated    #Anemia  - CKD vs. cancer vs. sepsis  - 1 unit PRBCs given  - CTM CBC  - No sign of bleeding    #DVT prophylaxis   - SCD    ID:  #Sepsis  #PNA  -new fever and leukocytosis 3/7-3/8 with worsening pulmonary findings, c/f PNA  -f/u Blood/sputum Cx  -Zosyn started 3/8  -ctm    #COVID-19  - Concern for possible superimposed PNA   - S/p zosyn 2/16- 2/28  - S/p Remdesivir    SKIN:  #Lines:  2x PIV    Ethics:  - Full Code  - Contact: Sister Ester [phone]  69 year old male with a past medical history of hypertension, hyperlipemia VAZQUEZ (on CPAP at bedtime, NC 2 liters during the day), CKD stage 3, epilepsy, schizophrenia, developmental delay, metastatic testicular cancer (s/p orchiectomy, actively on chemotherapy, last dose of etoposide/cisplatin on 6/2024), presenting with acute on chronic hypoxemic respiratory failure, secondary to (a) COVID-19 infection, (b) superimposed pneumonia after recent influenza infection, and/or (c) fluid overload. MICU consulted and accepted after RRT due to increased work of breathing.    NEURO:  #Mental status:  - hx of developmental delay  - Worsening mental status 3/8, likely iso respiratory distress  - Re-intubated 3/8, sedated with fent and prop    #Epilepsy:  - Continue with home meds which include Lacosamide, Lurasidone, Lamotrigine - increased to 50 BID  - EEG negative    #Brain Mets:  - MRI brain now with 5.4 mm enhancing lesion in the LEFT middle cerebellar peduncle with associated edema suspicious for metastases    # Schizophrenia  - Lurasidone halved to reduce oversedation    CV:  #Echo:  Recent echo last admission showing Left ventricular cavity is normal in size. Left ventricular systolic function is normal with an ejection fraction of 62 %. There are no regional wall motion abnormalities seen. Normal right ventricular cavity size and normal right ventricular systolic function.   - Repeat echo continuing to show EF 70%    #Afib w/RVR  - New onset Afib RVR (on tele, confirmed with EKG 2/19)   - Plan: Started metoprolol 5mg IVP q6 hours & Hep gtt -> metoprolol restarted  - If rate remains uncontrolled, can start Cardizem drip at 5mg/h  - c/w Heparin drip    #HTN:  - On Hydralazine 25mg TID, Amlodipine 5mg  -amlodipine 5 restarted 3/7 given hypertension    PULM:  #Vent   - Extubated 3/4  - Re-intubated 3/8 iso resp distress and copious secretions, bronchoscopy following intubation    #AHRF  - Patient admitted to the hospital for AHRF, found to be COVID (+)  - Likely 2/2 PNA, does not appear to CHF decompensation  -worsening secretions and consolidations on 3/8, iso fever/leukocytosis may be new PNA. Management as below    RENAL:  #SHAGUFTA: - Resolving  - Hx of CKD stage 3, Cr 2.38 at baseline, peak at 4.24  - In setting of COVID (+)  - Pre-renal, FeUrea 29%  - Monitor Cr  - Avoid nephrotoxic meds  - Wakefield in place  -appears to be near baseline at 3/7    GI:  No acute issues     #Diet: Tube feeds via OG tube  #Bowel Regimen  - On Senna and Miralax    ENDO:  No acute issues  Thyroid nodule noted in prior notes    HEMATOLOGIC:  #Thrombocytopenic  - Unclear origin  - Concern for possible mets to bone marrow? vs due to infection  - Continue to monitor  - Transfuse to maintain Plt>10K unless actively bleeding then maintain >50K  - Heme-Onc recs appreciated    #Anemia  - CKD vs. cancer vs. sepsis  - 1 unit PRBCs given  - CTM CBC  - No sign of bleeding    #DVT prophylaxis   - SCD    ID:  #Sepsis  #PNA  -new fever and leukocytosis 3/7-3/8 with worsening pulmonary findings, c/f PNA  -f/u Blood/sputum Cx  -Zosyn started 3/8  -ctm    #COVID-19  - Concern for possible superimposed PNA   - S/p zosyn 2/16- 2/28  - S/p Remdesivir    SKIN:  #Lines:  2x PIV    Ethics:  - Full Code  - Contact: Sister Ester [phone]  69 year old male with a past medical history of hypertension, hyperlipemia VAQZUEZ (on CPAP at bedtime, NC 2 liters during the day), CKD stage 3, epilepsy, schizophrenia, developmental delay, metastatic testicular cancer (s/p orchiectomy, actively on chemotherapy, last dose of etoposide/cisplatin on 6/2024), presenting with acute on chronic hypoxemic respiratory failure, secondary to (a) COVID-19 infection, (b) superimposed pneumonia after recent influenza infection, and/or (c) fluid overload. MICU consulted and accepted after RRT due to increased work of breathing.    NEURO:  #Mental status:  - hx of developmental delay  - Worsening mental status 3/8, likely iso respiratory distress  - Re-intubated 3/8, sedated with fent and prop    #Epilepsy:  - Continue with home meds which include Lacosamide, Lurasidone, Lamotrigine - increased to 50 BID  - EEG negative    #Brain Mets:  - MRI brain now with 5.4 mm enhancing lesion in the LEFT middle cerebellar peduncle with associated edema suspicious for metastases    # Schizophrenia  - Lurasidone halved to reduce oversedation    CV:  #Echo:  Recent echo last admission showing Left ventricular cavity is normal in size. Left ventricular systolic function is normal with an ejection fraction of 62 %. There are no regional wall motion abnormalities seen. Normal right ventricular cavity size and normal right ventricular systolic function.   - Repeat echo continuing to show EF 70%    #Afib w/RVR  - New onset Afib RVR (on tele, confirmed with EKG 2/19)   - Plan: Started metoprolol 5mg IVP q6 hours & Hep gtt -> metoprolol restarted  - If rate remains uncontrolled, can start Cardizem drip at 5mg/h  - c/w Heparin drip    #HTN:  - On Hydralazine 25mg TID, Amlodipine 5mg  -amlodipine 5 restarted 3/7 given hypertension    PULM:  #Vent   - Extubated 3/4  - Re-intubated 3/8 iso resp distress and copious secretions, bronchoscopy following intubation    #AHRF  - Patient admitted to the hospital for AHRF, found to be COVID (+)  - Likely 2/2 PNA, does not appear to CHF decompensation  -worsening secretions and consolidations on 3/8, iso fever/leukocytosis may be new PNA. Management as below    RENAL:  #SHAGUFTA: - Resolving  - Hx of CKD stage 3, Cr 2.38 at baseline, peak at 4.24  - In setting of COVID (+)  - Pre-renal, FeUrea 29%  - Monitor Cr  - Avoid nephrotoxic meds  - Wakefield in place  -appears to be near baseline at 3/7    GI:  No acute issues     #Diet: Tube feeds via OG tube  #Bowel Regimen  - On Senna and Miralax    ENDO:  No acute issues  Thyroid nodule noted in prior notes    HEMATOLOGIC:  #Thrombocytopenic  - Unclear origin  - Concern for possible mets to bone marrow? vs due to infection  - Continue to monitor  - Transfuse to maintain Plt>10K unless actively bleeding then maintain >50K  - Heme-Onc recs appreciated    #Anemia  - CKD vs. cancer vs. sepsis  - 1 unit PRBCs given  - CTM CBC  - No sign of bleeding    #DVT prophylaxis   - heparin gtt    ID:  #Sepsis  #PNA  -new fever and leukocytosis 3/7-3/8 with worsening pulmonary findings, c/f PNA  -f/u Blood/sputum Cx  -Zosyn started 3/8  - f/u UA and UCx, ?cloudy urine   if worsening consider broadening pseudomonal coverage    #COVID-19  - Concern for possible superimposed PNA   - S/p zosyn 2/16- 2/28  - S/p Remdesivir    SKIN:  #Lines:  2x PIV    Ethics:  - Full Code  - Contact: Sister Ester [phone]

## 2025-03-09 NOTE — PROGRESS NOTE ADULT - SUBJECTIVE AND OBJECTIVE BOX
SUBJECTIVE/ OVERNIGHT EVENTS:  re-intubated for increased work of breathing  comfortable  sedated.   remain in MICU      --------------------------------------------------------------------------------------------  LABS:                        7.7    11.82 )-----------( 157      ( 09 Mar 2025 00:27 )             25.4     03-09    147[H]  |  108  |  63[H]  ----------------------------<  107[H]  4.2   |  23  |  2.92[H]    Ca    10.1      09 Mar 2025 00:27  Phos  4.9     03-  Mg     2.6     -    TPro  6.8  /  Alb  2.7[L]  /  TBili  0.6  /  DBili  x   /  AST  27  /  ALT  24  /  AlkPhos  131[H]  03-09    PT/INR - ( 09 Mar 2025 00:27 )   PT: 13.6 sec;   INR: 1.20 ratio         PTT - ( 09 Mar 2025 00:27 )  PTT:58.3 sec  CAPILLARY BLOOD GLUCOSE      POCT Blood Glucose.: 123 mg/dL (08 Mar 2025 17:51)        Urinalysis Basic - ( 09 Mar 2025 11:17 )    Color: Yellow / Appearance: Cloudy / S.011 / pH: x  Gluc: x / Ketone: Negative mg/dL  / Bili: Negative / Urobili: 0.2 mg/dL   Blood: x / Protein: 100 mg/dL / Nitrite: Negative   Leuk Esterase: Large / RBC: 24 /HPF /  /HPF   Sq Epi: x / Non Sq Epi: 3 /HPF / Bacteria: Few /HPF        RADIOLOGY & ADDITIONAL TESTS:    Imaging Personally Reviewed:  [x] YES  [ ] NO    Consultant(s) Notes Reviewed:  [x] YES  [ ] NO    MEDICATIONS  (STANDING):  albuterol/ipratropium for Nebulization 3 milliLiter(s) Nebulizer every 6 hours  amLODIPine   Tablet 5 milliGRAM(s) Oral daily  atorvastatin 20 milliGRAM(s) Oral at bedtime  chlorhexidine 0.12% Liquid 15 milliLiter(s) Oral Mucosa every 12 hours  cholecalciferol 1000 Unit(s) Oral daily  dexMEDEtomidine Infusion 0.2 MICROgram(s)/kG/Hr (4.46 mL/Hr) IV Continuous <Continuous>  erythromycin   Ointment 1 Application(s) Both EYES four times a day  escitalopram 15 milliGRAM(s) Oral with breakfast  fentaNYL   Infusion... 0.5 MICROgram(s)/kG/Hr (2.23 mL/Hr) IV Continuous <Continuous>  gabapentin Solution 150 milliGRAM(s) Oral every 12 hours  heparin  Infusion. 1700 Unit(s)/Hr (17 mL/Hr) IV Continuous <Continuous>  influenza  Vaccine (HIGH DOSE) 0.5 milliLiter(s) IntraMuscular once  lacosamide IVPB 200 milliGRAM(s) IV Intermittent every 12 hours  lamoTRIgine 50 milliGRAM(s) Oral two times a day  lanolin Ointment 1 Application(s) Topical daily  levETIRAcetam   Injectable 750 milliGRAM(s) IV Push every 12 hours  lurasidone 20 milliGRAM(s) Oral at bedtime  metoprolol tartrate 12.5 milliGRAM(s) Oral two times a day  norepinephrine Infusion 0.05 MICROgram(s)/kG/Min (8.35 mL/Hr) IV Continuous <Continuous>  nystatin Powder 1 Application(s) Topical two times a day  ofloxacin 0.3% Solution 1 Drop(s) Right EYE every 4 hours  pantoprazole  Injectable 40 milliGRAM(s) IV Push daily  piperacillin/tazobactam IVPB.. 3.375 Gram(s) IV Intermittent every 8 hours  polyethylene glycol 3350 17 Gram(s) Oral daily  propofol Infusion 30 MICROgram(s)/kG/Min (16 mL/Hr) IV Continuous <Continuous>  senna Syrup 10 milliLiter(s) Oral at bedtime  sevelamer carbonate Powder 1600 milliGRAM(s) Oral every 8 hours  sodium chloride 3%  Inhalation 4 milliLiter(s) Inhalation every 6 hours    MEDICATIONS  (PRN):  artificial tears (preservative free) Ophthalmic Solution 1 Drop(s) Both EYES every 6 hours PRN Dry Eyes  tranexamic acid Injectable for Topical Use 5 milliLiter(s) Topical once PRN Excessive Lip Bleeding      Care Discussed with Consultants/Other Providers [x] YES  [ ] NO    Vital Signs Last 24 Hrs  T(C): 36.8 (09 Mar 2025 04:00), Max: 37.2 (08 Mar 2025 17:00)  T(F): 98.3 (09 Mar 2025 04:00), Max: 98.9 (08 Mar 2025 17:00)  HR: 86 (09 Mar 2025 11:45) (67 - 112)  BP: 103/56 (09 Mar 2025 11:30) (74/37 - 161/77)  BP(mean): 76 (09 Mar 2025 11:30) (50 - 110)  RR: 20 (09 Mar 2025 11:45) (16 - 51)  SpO2: 99% (09 Mar 2025 11:45) (96% - 100%)    Parameters below as of 09 Mar 2025 11:38  Patient On (Oxygen Delivery Method): ventilator      I&O's Summary    08 Mar 2025 06:01  -  09 Mar 2025 07:00  --------------------------------------------------------  IN: 2116.4 mL / OUT: 550 mL / NET: 1566.4 mL        PHYSICAL EXAM:  GENERAL: intubated, sedated  HEAD:  Atraumatic, Normocephalic  EYES: EOMI, PERRLA, conjunctiva and sclera clear  NECK: Supple, No JVD  CHEST/LUNG: mild decrease breath sounds bilaterally; No wheeze   HEART: Regular rate and rhythm; No murmurs, rubs, or gallops  ABDOMEN: Soft, Nontender, Nondistended; Bowel sounds present  Neuro:  intubated, sedated  EXTREMITIES:  2+ Peripheral Pulses, No clubbing, cyanosis, or edema  SKIN: No rashes or lesions

## 2025-03-09 NOTE — PROGRESS NOTE ADULT - SUBJECTIVE AND OBJECTIVE BOX
INTERVAL HPI/OVERNIGHT EVENTS:  No acute overnight events.     SUBJECTIVE: Patient seen and examined at bedside. Denies fevers, chills, CP, SOB, Abdominal pain, N/V, Constipation, and Diarrhea.    12 Point ROS negative with the exception of the above    OBJECTIVE:    VITAL SIGNS:  ICU Vital Signs Last 24 Hrs  T(C): 36.8 (09 Mar 2025 00:00), Max: 37.4 (08 Mar 2025 08:00)  T(F): 98.3 (09 Mar 2025 00:00), Max: 99.4 (08 Mar 2025 08:00)  HR: 77 (09 Mar 2025 05:35) (73 - 112)  BP: 125/61 (09 Mar 2025 04:00) (89/52 - 179/86)  BP(mean): 85 (09 Mar 2025 04:00) (65 - 123)  ABP: --  ABP(mean): --  RR: 17 (09 Mar 2025 04:00) (16 - 59)  SpO2: 100% (09 Mar 2025 05:35) (96% - 100%)    O2 Parameters below as of 09 Mar 2025 05:35  Patient On (Oxygen Delivery Method): ventilator          Mode: AC/ CMV (Assist Control/ Continuous Mandatory Ventilation), RR (machine): 16, TV (machine): 450, FiO2: 60, PEEP: 6, ITime: 1, MAP: 10, PIP: 31    03-07 @ 07:01  -  03-08 @ 07:00  --------------------------------------------------------  IN: 1937 mL / OUT: 0 mL / NET: 1937 mL    03-08 @ 06:01  -  03-09 @ 06:41  --------------------------------------------------------  IN: 1550.7 mL / OUT: 450 mL / NET: 1100.7 mL      CAPILLARY BLOOD GLUCOSE      POCT Blood Glucose.: 123 mg/dL (08 Mar 2025 17:51)      PHYSICAL EXAM:    General: NAD  HEENT: NC/AT; PERRL, clear conjunctiva  Neck: supple  Respiratory: CTA b/l  Cardiovascular: +S1/S2; RRR  Abdomen: soft, NT/ND; +BS x4  Extremities: WWP, 2+ peripheral pulses b/l; no LE edema  Skin: normal color and turgor; no rash  Neurological:    MEDICATIONS:  MEDICATIONS  (STANDING):  albuterol/ipratropium for Nebulization 3 milliLiter(s) Nebulizer every 6 hours  amLODIPine   Tablet 5 milliGRAM(s) Oral daily  atorvastatin 20 milliGRAM(s) Oral at bedtime  chlorhexidine 0.12% Liquid 15 milliLiter(s) Oral Mucosa every 12 hours  cholecalciferol 1000 Unit(s) Oral daily  erythromycin   Ointment 1 Application(s) Both EYES four times a day  escitalopram 15 milliGRAM(s) Oral with breakfast  fentaNYL   Infusion... 0.5 MICROgram(s)/kG/Hr (2.23 mL/Hr) IV Continuous <Continuous>  gabapentin Solution 150 milliGRAM(s) Oral every 12 hours  heparin  Infusion. 1700 Unit(s)/Hr (17 mL/Hr) IV Continuous <Continuous>  influenza  Vaccine (HIGH DOSE) 0.5 milliLiter(s) IntraMuscular once  lacosamide IVPB 200 milliGRAM(s) IV Intermittent every 12 hours  lamoTRIgine 50 milliGRAM(s) Oral two times a day  lanolin Ointment 1 Application(s) Topical daily  levETIRAcetam   Injectable 750 milliGRAM(s) IV Push every 12 hours  lurasidone 20 milliGRAM(s) Oral at bedtime  metoprolol tartrate 12.5 milliGRAM(s) Oral two times a day  norepinephrine Infusion 0.05 MICROgram(s)/kG/Min (8.35 mL/Hr) IV Continuous <Continuous>  nystatin Powder 1 Application(s) Topical two times a day  ofloxacin 0.3% Solution 1 Drop(s) Right EYE every 4 hours  pantoprazole  Injectable 40 milliGRAM(s) IV Push daily  piperacillin/tazobactam IVPB.. 3.375 Gram(s) IV Intermittent every 8 hours  polyethylene glycol 3350 17 Gram(s) Oral daily  propofol Infusion 30 MICROgram(s)/kG/Min (16 mL/Hr) IV Continuous <Continuous>  senna Syrup 10 milliLiter(s) Oral at bedtime  sevelamer carbonate Powder 1600 milliGRAM(s) Oral every 8 hours  sodium chloride 3%  Inhalation 4 milliLiter(s) Inhalation every 6 hours    MEDICATIONS  (PRN):  artificial tears (preservative free) Ophthalmic Solution 1 Drop(s) Both EYES every 6 hours PRN Dry Eyes  tranexamic acid Injectable for Topical Use 5 milliLiter(s) Topical once PRN Excessive Lip Bleeding      ALLERGIES:  Allergies    seasonal allergies (Unknown)  No Known Drug Allergies    Intolerances    latex (Rash)      LABS:                        7.7    11.82 )-----------( 157      ( 09 Mar 2025 00:27 )             25.4     03-09    147[H]  |  108  |  63[H]  ----------------------------<  107[H]  4.2   |  23  |  2.92[H]    Ca    10.1      09 Mar 2025 00:27  Phos  4.9     03-09  Mg     2.6     03-09    TPro  6.8  /  Alb  2.7[L]  /  TBili  0.6  /  DBili  x   /  AST  27  /  ALT  24  /  AlkPhos  131[H]  03-09    PT/INR - ( 09 Mar 2025 00:27 )   PT: 13.6 sec;   INR: 1.20 ratio         PTT - ( 09 Mar 2025 00:27 )  PTT:58.3 sec  Urinalysis Basic - ( 09 Mar 2025 00:27 )    Color: x / Appearance: x / SG: x / pH: x  Gluc: 107 mg/dL / Ketone: x  / Bili: x / Urobili: x   Blood: x / Protein: x / Nitrite: x   Leuk Esterase: x / RBC: x / WBC x   Sq Epi: x / Non Sq Epi: x / Bacteria: x        RADIOLOGY & ADDITIONAL TESTS: Reviewed. INTERVAL HPI/OVERNIGHT EVENTS:  Re-intubated 3/8/25 iso copious secretions    SUBJECTIVE: Patient seen and examined at bedside. Unable to obtain ROS.    12 Point ROS negative with the exception of the above    OBJECTIVE:    VITAL SIGNS:  ICU Vital Signs Last 24 Hrs  T(C): 36.8 (09 Mar 2025 00:00), Max: 37.4 (08 Mar 2025 08:00)  T(F): 98.3 (09 Mar 2025 00:00), Max: 99.4 (08 Mar 2025 08:00)  HR: 77 (09 Mar 2025 05:35) (73 - 112)  BP: 125/61 (09 Mar 2025 04:00) (89/52 - 179/86)  BP(mean): 85 (09 Mar 2025 04:00) (65 - 123)  ABP: --  ABP(mean): --  RR: 17 (09 Mar 2025 04:00) (16 - 59)  SpO2: 100% (09 Mar 2025 05:35) (96% - 100%)    O2 Parameters below as of 09 Mar 2025 05:35  Patient On (Oxygen Delivery Method): ventilator          Mode: AC/ CMV (Assist Control/ Continuous Mandatory Ventilation), RR (machine): 16, TV (machine): 450, FiO2: 60, PEEP: 6, ITime: 1, MAP: 10, PIP: 31    03-07 @ 07:01  -  03-08 @ 07:00  --------------------------------------------------------  IN: 1937 mL / OUT: 0 mL / NET: 1937 mL    03-08 @ 06:01  -  03-09 @ 06:41  --------------------------------------------------------  IN: 1550.7 mL / OUT: 450 mL / NET: 1100.7 mL      CAPILLARY BLOOD GLUCOSE      POCT Blood Glucose.: 123 mg/dL (08 Mar 2025 17:51)      PHYSICAL EXAM:    General: NAD, sedated, intubated  HEENT: NC/AT; PERRL, clear conjunctiva  Neck: supple  Respiratory: CTA b/l, intubated with b/l vent sounds  Cardiovascular: +S1/S2; RRR  Abdomen: soft, NT/ND; +BS x4  Extremities: WWP, 2+ peripheral pulses b/l; no LE edema  Skin: normal color and turgor; no rash  Neurological: unable to assess    MEDICATIONS:  MEDICATIONS  (STANDING):  albuterol/ipratropium for Nebulization 3 milliLiter(s) Nebulizer every 6 hours  amLODIPine   Tablet 5 milliGRAM(s) Oral daily  atorvastatin 20 milliGRAM(s) Oral at bedtime  chlorhexidine 0.12% Liquid 15 milliLiter(s) Oral Mucosa every 12 hours  cholecalciferol 1000 Unit(s) Oral daily  erythromycin   Ointment 1 Application(s) Both EYES four times a day  escitalopram 15 milliGRAM(s) Oral with breakfast  fentaNYL   Infusion... 0.5 MICROgram(s)/kG/Hr (2.23 mL/Hr) IV Continuous <Continuous>  gabapentin Solution 150 milliGRAM(s) Oral every 12 hours  heparin  Infusion. 1700 Unit(s)/Hr (17 mL/Hr) IV Continuous <Continuous>  influenza  Vaccine (HIGH DOSE) 0.5 milliLiter(s) IntraMuscular once  lacosamide IVPB 200 milliGRAM(s) IV Intermittent every 12 hours  lamoTRIgine 50 milliGRAM(s) Oral two times a day  lanolin Ointment 1 Application(s) Topical daily  levETIRAcetam   Injectable 750 milliGRAM(s) IV Push every 12 hours  lurasidone 20 milliGRAM(s) Oral at bedtime  metoprolol tartrate 12.5 milliGRAM(s) Oral two times a day  norepinephrine Infusion 0.05 MICROgram(s)/kG/Min (8.35 mL/Hr) IV Continuous <Continuous>  nystatin Powder 1 Application(s) Topical two times a day  ofloxacin 0.3% Solution 1 Drop(s) Right EYE every 4 hours  pantoprazole  Injectable 40 milliGRAM(s) IV Push daily  piperacillin/tazobactam IVPB.. 3.375 Gram(s) IV Intermittent every 8 hours  polyethylene glycol 3350 17 Gram(s) Oral daily  propofol Infusion 30 MICROgram(s)/kG/Min (16 mL/Hr) IV Continuous <Continuous>  senna Syrup 10 milliLiter(s) Oral at bedtime  sevelamer carbonate Powder 1600 milliGRAM(s) Oral every 8 hours  sodium chloride 3%  Inhalation 4 milliLiter(s) Inhalation every 6 hours    MEDICATIONS  (PRN):  artificial tears (preservative free) Ophthalmic Solution 1 Drop(s) Both EYES every 6 hours PRN Dry Eyes  tranexamic acid Injectable for Topical Use 5 milliLiter(s) Topical once PRN Excessive Lip Bleeding      ALLERGIES:  Allergies    seasonal allergies (Unknown)  No Known Drug Allergies    Intolerances    latex (Rash)      LABS:                        7.7    11.82 )-----------( 157      ( 09 Mar 2025 00:27 )             25.4     03-09    147[H]  |  108  |  63[H]  ----------------------------<  107[H]  4.2   |  23  |  2.92[H]    Ca    10.1      09 Mar 2025 00:27  Phos  4.9     03-09  Mg     2.6     03-09    TPro  6.8  /  Alb  2.7[L]  /  TBili  0.6  /  DBili  x   /  AST  27  /  ALT  24  /  AlkPhos  131[H]  03-09    PT/INR - ( 09 Mar 2025 00:27 )   PT: 13.6 sec;   INR: 1.20 ratio         PTT - ( 09 Mar 2025 00:27 )  PTT:58.3 sec  Urinalysis Basic - ( 09 Mar 2025 00:27 )    Color: x / Appearance: x / SG: x / pH: x  Gluc: 107 mg/dL / Ketone: x  / Bili: x / Urobili: x   Blood: x / Protein: x / Nitrite: x   Leuk Esterase: x / RBC: x / WBC x   Sq Epi: x / Non Sq Epi: x / Bacteria: x        RADIOLOGY & ADDITIONAL TESTS: Reviewed.

## 2025-03-09 NOTE — PROGRESS NOTE ADULT - ASSESSMENT
70 y/o M with PMH of HTN, HLD, VAZQUEZ on CPAP and home O2 (2LNC daytime), CKD, epilepsy, schizophrenia developmental delay, metastatic testicular CA. Recent admission at [facility] for acute respiratory failure in the setting of seizures, influenza infection, pulmonary edema, PNA requiring intubation in MICU. Was eventually discharged to rehab. Presents now with SOB, found to be COVID + at facility on 2/13. Febrile on admission to ED to 101.9F. Placed on Bipap for increased WOB. Pulmonary called to consult for acute respiratory failure.

## 2025-03-09 NOTE — PROGRESS NOTE ADULT - ASSESSMENT
69 year old male with a past medical history of hypertension, hyperlipemia VAZQUEZ (on CPAP at bedtime, NC 2 liters during the day), CKD stage 3, epilepsy, schizophrenia, developmental delay, metastatic testicular cancer (s/p orchiectomy, actively on chemotherapy, last dose of etoposide/cisplatin on 6/2024) presenting with worsening shortness of breath. Patient was recently hospitalized at [facility] from January 27th 2025 to February 6th 2025 for seizure and influenza virus infection, which required intubation. He was sent to rehab (originally from home) from hospital. He returns due to sudden onset shortness of breath and worsening oxygenation. Of note, he tested positive for COVID-19       1- SHAGUFTA on CKDIII  2- covid-19  3- anemia  4- hypotension  5- respiratory failure         SHAGUFTA suspected in setting of new infection. covid 19   cr and bun rising again   levophed drip for bp support   trend cr and lytes   hyperphosphatemia   renvela 1600 mg tid feeding tube if able   anemia, trend hb

## 2025-03-09 NOTE — PROGRESS NOTE ADULT - SUBJECTIVE AND OBJECTIVE BOX
[facility]  JACINTO Horton Y. Patel, S. Shah, G. Casimir  [phone]  ([phone] - weekdays after 5pm and weekends)    Name: OSCAR MILAN  Age/Gender: 69y Male  MRN: [id number]    Interval History:  Patient seen and examined this morning.   Intubated, on pressor, sedated.   Notes reviewed, events noted.   Allergies: seasonal allergies (Unknown)  No Known Drug Allergies      Objective:  Vitals:   T(F): 98.3 (03-09-25 @ 00:00), Max: 99.4 (03-08-25 @ 08:00)  HR: 77 (03-09-25 @ 05:35) (73 - 112)  BP: 125/61 (03-09-25 @ 04:00) (89/52 - 179/86)  RR: 17 (03-09-25 @ 04:00) (16 - 59)  SpO2: 100% (03-09-25 @ 05:35) (96% - 100%)  Physical Examination:  General: no acute distress  HEENT: NC/AT, upper lip scab, ETT  Respiratory: decreased breath sounds bilaterally   Cardiovascular: S1 S2 present, normal rate   Gastrointestinal: soft, nondistended, nontender   Extremities: UE edema, no LE edema, no cyanosis  Skin: no visible rash    Laboratory Studies:  CBC:                       7.7    11.82 )-----------( 157      ( 09 Mar 2025 00:27 )             25.4     WBC Trend:  11.82 03-09-25 @ 00:27  13.54 03-08-25 @ 01:11  20.24 03-08-25 @ 00:16  14.83 03-06-25 @ 23:42  9.52 03-06-25 @ 00:18  8.84 03-05-25 @ 00:15  8.48 03-04-25 @ 00:40  7.06 03-03-25 @ 12:41  7.14 03-03-25 @ 00:57  8.02 03-03-25 @ 00:01  9.10 03-02-25 @ 12:01    CMP: 03-09    147[H]  |  108  |  63[H]  ----------------------------<  107[H]  4.2   |  23  |  2.92[H]    Ca    10.1      09 Mar 2025 00:27  Phos  4.9     03-09  Mg     2.6     03-09    TPro  6.8  /  Alb  2.7[L]  /  TBili  0.6  /  DBili  x   /  AST  27  /  ALT  24  /  AlkPhos  131[H]  03-09    Creatinine: 2.92 mg/dL (03-09-25 @ 00:27)  Creatinine: 2.40 mg/dL (03-08-25 @ 00:16)  Creatinine: 2.52 mg/dL (03-06-25 @ 23:42)  Creatinine: 2.92 mg/dL (03-06-25 @ 00:11)  Creatinine: 3.31 mg/dL (03-05-25 @ 00:15)  Creatinine: 3.99 mg/dL (03-04-25 @ 00:26)  Creatinine: 4.34 mg/dL (03-03-25 @ 00:01)  Creatinine: 4.06 mg/dL (03-02-25 @ 12:01)    LIVER FUNCTIONS - ( 09 Mar 2025 00:27 )  Alb: 2.7 g/dL / Pro: 6.8 g/dL / ALK PHOS: 131 U/L / ALT: 24 U/L / AST: 27 U/L / GGT: x           Microbiology: reviewed   Culture - Bronchial (collected 03-08-25 @ 15:27)  Source: Bronchial Bronchial Lavage  Gram Stain (03-09-25 @ 00:20):    Moderate polymorphonuclear leukocytes per low power field    No Squamous epithelial cells per low power field    Moderate Gram Negative Rods per oil power field    Few Gram positive cocci in pairs per oil power field    Culture - Blood (collected 03-08-25 @ 00:00)  Source: Blood Blood  Preliminary Report (03-09-25 @ 04:02):    No growth at 24 hours    Culture - Blood (collected 03-07-25 @ 23:30)  Source: Blood Blood  Preliminary Report (03-09-25 @ 04:02):    No growth at 24 hours    Culture - Sputum (collected 03-07-25 @ 18:42)  Source: Sputum Sputum  Gram Stain (03-08-25 @ 07:01):    Rare polymorphonuclear leukocytes per low power field    Few Squamous epithelial cells per low power field    Moderate Gram Negative Rods seen per oil power field  Preliminary Report (03-08-25 @ 15:55):    Numerous Pseudomonas aeruginosa    Commensal lucia consistent with body site    Culture - Eye (collected 03-05-25 @ 16:15)  Source: Eye Conjunctiva-Right  Gram Stain (03-08-25 @ 00:03):    No polymorphonuclear cells seen    No organisms seen    by cytocentrifuge  Preliminary Report (03-08-25 @ 00:42):    Rare Staphylococcus epidermidis    Radiology: reviewed     Medications:  albuterol/ipratropium for Nebulization 3 milliLiter(s) Nebulizer every 6 hours  amLODIPine   Tablet 5 milliGRAM(s) Oral daily  artificial tears (preservative free) Ophthalmic Solution 1 Drop(s) Both EYES every 6 hours PRN  atorvastatin 20 milliGRAM(s) Oral at bedtime  chlorhexidine 0.12% Liquid 15 milliLiter(s) Oral Mucosa every 12 hours  cholecalciferol 1000 Unit(s) Oral daily  erythromycin   Ointment 1 Application(s) Both EYES four times a day  escitalopram 15 milliGRAM(s) Oral with breakfast  fentaNYL   Infusion... 0.5 MICROgram(s)/kG/Hr IV Continuous <Continuous>  gabapentin Solution 150 milliGRAM(s) Oral every 12 hours  heparin  Infusion. 1700 Unit(s)/Hr IV Continuous <Continuous>  influenza  Vaccine (HIGH DOSE) 0.5 milliLiter(s) IntraMuscular once  lacosamide IVPB 200 milliGRAM(s) IV Intermittent every 12 hours  lamoTRIgine 50 milliGRAM(s) Oral two times a day  lanolin Ointment 1 Application(s) Topical daily  levETIRAcetam   Injectable 750 milliGRAM(s) IV Push every 12 hours  lurasidone 20 milliGRAM(s) Oral at bedtime  metoprolol tartrate 12.5 milliGRAM(s) Oral two times a day  norepinephrine Infusion 0.05 MICROgram(s)/kG/Min IV Continuous <Continuous>  nystatin Powder 1 Application(s) Topical two times a day  ofloxacin 0.3% Solution 1 Drop(s) Right EYE every 4 hours  pantoprazole  Injectable 40 milliGRAM(s) IV Push daily  piperacillin/tazobactam IVPB.. 3.375 Gram(s) IV Intermittent every 8 hours  polyethylene glycol 3350 17 Gram(s) Oral daily  propofol Infusion 30 MICROgram(s)/kG/Min IV Continuous <Continuous>  senna Syrup 10 milliLiter(s) Oral at bedtime  sevelamer carbonate Powder 1600 milliGRAM(s) Oral every 8 hours  sodium chloride 3%  Inhalation 4 milliLiter(s) Inhalation every 6 hours  tranexamic acid Injectable for Topical Use 5 milliLiter(s) Topical once PRN    Current Antimicrobials:  piperacillin/tazobactam IVPB.. 3.375 Gram(s) IV Intermittent every 8 hours    Prior/Completed Antimicrobials:  piperacillin/tazobactam IVPB.  piperacillin/tazobactam IVPB.-  piperacillin/tazobactam IVPB.-  piperacillin/tazobactam IVPB..  piperacillin/tazobactam IVPB..  piperacillin/tazobactam IVPB...  remdesivir  IVPB  remdesivir  IVPB  remdesivir  IVPB  vancomycin  IVPB.

## 2025-03-09 NOTE — PROGRESS NOTE ADULT - ASSESSMENT
Mr. Gr is a 69 year old male with PMHx of seizure disorder, sleep apnea, HTN, chronic idiopathic constipation, stage III CKD, severe obesity, secondary hyperparathyroidism, anemia, thrombocytopenia, hyperlipidemia, testicular cancer under treatment with Dr. Ovalles who is admitted for acute hypoxic respiratory failure secondary to COVID vs superimposed pneumonia with new onset thrombocytopenia. He was recently discharged 02/06/2025 after a tenous stay including intubation in the ICU for respiratory failure in setting of seizure. He had recovered and was discharged to rehab.      Former smoker, quit 14y prior, denied ETOH, drug use. Lives at home. Does not have children. Denies family history of blood disorders or malignancy.  Denies previous workplace related exposures.  Denies previous history of blood transfusions.  Denied history of thromboses.  Denied history of  requiring anticoagulation.     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  Margins were negative.  pT3Nx. CT C/A/P with few left para-aortic and left iliac chain lymph nodes largest measuring 8.1 cm left para-aortic node, bilateral subcentimeter noncalcified pulmonary nodules measuring up to 7 mm. AFP, LDH, hCG were all elevated. 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 Started on adjuvant therapy with Cisplatin+Etoposide, so far completed 4 cycles of treatment with cisplatin dose reduced 50% and Etoposide 50% due to thrombocytopenia.      02/19/2025: on HFNC, noted tachycardia and fever, Klebsiella in urine as well, thrombocytopenia stable/improving, no signs of active bleeding     02/20/2025: developed A.fib with RVR and was placed on heparin drip and pending cardiology eval    02/21/2025: awake, on AVAPS overnight, noted RRT for hypoxia as pt removed HFNC at some point in time, good response thereafter     02/22/2025:  continues on AVAPS this morning, noted RRT overnight for hypoxia, hypercapnia, ICU following, US LE negative for DVT, counts overall stable/improved     02/23/2025: progressive respiratory failure, noted ongoing RTT this morning with pending intubation and transfer to MICU     02/24/2025: intubated 02/23/2025, sedated, on pressor support, no signs of bleeding, counts noted, no signs of bleeding     02/25/2025: continues in MICU, intubated and sedated, no signs of bleeding    02/26/2025: continues in MICU, intubated, without signs of bleeding, continues on heparin drip     02/27/2025:  remains under the care of the ICU, intubated, no signs of bleeding and continues on heparin drip, counts stable, has not required PRBC support this admission    02/28/2025: continues under the care of MICU, continues intubated, no signs of bleeding, on heparin drip    03/01/2025: continues in MICU, intubated, direct josefina IgG positive, eluate negative, not likely to be clinically significant     03/02/2025:  continues in MICU, nursing staff at bedside, no overnight events noted. CTH without acute intracranial pathology     03/03/2025: continues in MICU, intubated, on heparin drip, 1u PRBC overnight, no signs of bleeding, platelet count stable     03/04/2025: awake, moving arms spontaneously on exam, continues without much interaction however. No signs of bleeding, continues heparin drip, continues intubated in MICU     03/05/2025: extubated 03/04/2025, continues in MICU, awake and alert this morning and able to speak full sentences, discussed/reviewed findings, continues on heparin drip     03/06/2025: nursing staff at bedside, bipap overnight, without signs of bleeding, counts noted stable, renal function improving     03/07/2025:  no signs of bleeding, counts noted, continues heparin drip, continues in MICU    03/08/2025: on HFNC, no signs of bleeding, although alert and answering questions, not back to his baseline yet, continues in MICU      #Acute hypoxic respiratory failure  # COVID  - CT noted with bilateral GGO  - ID following  - Pulmonary following  - Antibiotics per primary team/MICU and ID   - Intubated 02/23/2025 AM, MICU   - extubated 03/04/2025    # Thrombocytopenia   - Did occur during most recent admission and improved to normal prior to discharge   - Without signs of bleeding  - Could be multifactorial, possibly due to infection   - Continue to trend  - Transfuse to maintain Plt > 10k unless actively bleeding then maintain > 50k     # Brain lesion  - pt with hx of seizures  - MRI brain now with 5.4 mm enhancing lesion in the LEFT middle cerebellar peduncle with associated edema suspicious for metastases  - MRI Lumbar negative for acute disease  - Small lesion without mass effect or edema, recommended to correlate per neurology if foci and locus are concordant with seizure activity  - Neurology recs noted, new lesion not likely to cause seizure  - It is rare, but nonetheless not impossible, for testicular cancer to be metastatic to the brain  - He will need another PET-CT, can be completed outpatient once stable if concern can proceed with biopsy at that time   - Previous endocrinology eval for thyroid nodule noted  - AFP/BH[facility] normal,  previously   - Repeat CTH without acute intracranial pathology       # 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 Ovalles, agree with current plan  - Follow up as outpatient with Franki Ovalles MD     # Acute kidney injury on CKD Stage IIIA  - Likely multifactorial  - Nephrology consult and recs noted  - Continue to trend     # Normocytic anemia  - Chronic, has hx of PRBC during chemo 07/2024  - Noted Anti E, Anti-K Anti-C antibodies previously  - direct josefina IgG positive, eluate negative, not likely to be clinically significant   - s/p 1u PRBC 03/03/2025   - Monitor for bleeding  - Recommend to transfuse to maintain Hb > 7    # Klebsiella UTI  -Antibiotics per ID and primary team       Recommendations  - Trend CBC daily   - Transfuse to maintain Hb > 7   - Transfuse to maintain Plt >10k unless active bleeding then >50k   - Monitor renal function  - Cardiology follow up to address anticoagulation, currently on heparin drip   - f/u ongoing ICU recs         Thank you for allowing me to participate in the care of Mr. Gr please do not hesitate to call or text me if you have further questions or concerns.     Brayan Mart MD  [facility] [place]   Division of Hematology/Oncology  [facility], [address]  [place] [address]  P: [phone]  F: [phone]    Attestation:    ----Pt evaluated including face-to-face interaction in addition to chart review, reviewing treatment plan, and managing the patient’s chronic diagnoses as listed in the assessment----        Mr. Gr is a 69 year old male with PMHx of seizure disorder, sleep apnea, HTN, chronic idiopathic constipation, stage III CKD, severe obesity, secondary hyperparathyroidism, anemia, thrombocytopenia, hyperlipidemia, testicular cancer under treatment with Dr. Ovalles who is admitted for acute hypoxic respiratory failure secondary to COVID vs superimposed pneumonia with new onset thrombocytopenia. He was recently discharged 02/06/2025 after a tenous stay including intubation in the ICU for respiratory failure in setting of seizure. He had recovered and was discharged to rehab.      Former smoker, quit 14y prior, denied ETOH, drug use. Lives at home. Does not have children. Denies family history of blood disorders or malignancy.  Denies previous workplace related exposures.  Denies previous history of blood transfusions.  Denied history of thromboses.  Denied history of  requiring anticoagulation.     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  Margins were negative.  pT3Nx. CT C/A/P with few left para-aortic and left iliac chain lymph nodes largest measuring 8.1 cm left para-aortic node, bilateral subcentimeter noncalcified pulmonary nodules measuring up to 7 mm. AFP, LDH, hCG were all elevated. 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 Started on adjuvant therapy with Cisplatin+Etoposide, so far completed 4 cycles of treatment with cisplatin dose reduced 50% and Etoposide 50% due to thrombocytopenia.      02/19/2025: on HFNC, noted tachycardia and fever, Klebsiella in urine as well, thrombocytopenia stable/improving, no signs of active bleeding     02/20/2025: developed A.fib with RVR and was placed on heparin drip and pending cardiology eval    02/21/2025: awake, on AVAPS overnight, noted RRT for hypoxia as pt removed HFNC at some point in time, good response thereafter     02/22/2025:  continues on AVAPS this morning, noted RRT overnight for hypoxia, hypercapnia, ICU following, US LE negative for DVT, counts overall stable/improved     02/23/2025: progressive respiratory failure, noted ongoing RTT this morning with pending intubation and transfer to MICU     02/24/2025: intubated 02/23/2025, sedated, on pressor support, no signs of bleeding, counts noted, no signs of bleeding     02/25/2025: continues in MICU, intubated and sedated, no signs of bleeding    02/26/2025: continues in MICU, intubated, without signs of bleeding, continues on heparin drip     02/27/2025:  remains under the care of the ICU, intubated, no signs of bleeding and continues on heparin drip, counts stable, has not required PRBC support this admission    02/28/2025: continues under the care of MICU, continues intubated, no signs of bleeding, on heparin drip    03/01/2025: continues in MICU, intubated, direct josefina IgG positive, eluate negative, not likely to be clinically significant     03/02/2025:  continues in MICU, nursing staff at bedside, no overnight events noted. CTH without acute intracranial pathology     03/03/2025: continues in MICU, intubated, on heparin drip, 1u PRBC overnight, no signs of bleeding, platelet count stable     03/04/2025: awake, moving arms spontaneously on exam, continues without much interaction however. No signs of bleeding, continues heparin drip, continues intubated in MICU     03/05/2025: extubated 03/04/2025, continues in MICU, awake and alert this morning and able to speak full sentences, discussed/reviewed findings, continues on heparin drip     03/06/2025: nursing staff at bedside, bipap overnight, without signs of bleeding, counts noted stable, renal function improving     03/07/2025:  no signs of bleeding, counts noted, continues heparin drip, continues in MICU    03/08/2025: on HFNC, no signs of bleeding, although alert and answering questions, not back to his baseline yet, continues in MICU    03/09/2025: continues in MICU, s/p re-intubation 03/08/2025 given respiratory compromise in the setting of copious secretions as evidenced by bronchoscopy, in setting of fever, now sedated, mild crusting of blood around mouth, without active sign of bleeding, counts noted reviewed, continues on heparin drip       #Acute hypoxic respiratory failure  # COVID  - CT noted with bilateral GGO  - ID following  - Pulmonary following  - Antibiotics per primary team/MICU and ID   - Intubated 02/23/2025 AM, MICU   - extubated 03/04/2025  - Re-intubated 03/09/2025 due to secretions, not protecting airway, tachypnea, hypoxia     # Thrombocytopenia   - Did occur during most recent admission and improved to normal prior to discharge   - Without signs of bleeding  - Could be multifactorial, possibly due to infection   - Continue to trend  - Transfuse to maintain Plt > 10k unless actively bleeding then maintain > 50k     # Brain lesion  - pt with hx of seizures  - MRI brain now with 5.4 mm enhancing lesion in the LEFT middle cerebellar peduncle with associated edema suspicious for metastases  - MRI Lumbar negative for acute disease  - Small lesion without mass effect or edema, recommended to correlate per neurology if foci and locus are concordant with seizure activity  - Neurology recs noted, new lesion not likely to cause seizure  - It is rare, but nonetheless not impossible, for testicular cancer to be metastatic to the brain  - He will need another PET-CT, can be completed outpatient once stable if concern can proceed with biopsy at that time   - Previous endocrinology eval for thyroid nodule noted  - AFP/BHCH normal,  previously   - Repeat CTH without acute intracranial pathology       # 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 Ovalles, agree with current plan  - Follow up as outpatient with Franki Ovalles MD     # Acute kidney injury on CKD Stage IIIA  - Likely multifactorial  - Nephrology consult and recs noted  - Continue to trend     # Normocytic anemia  - Chronic, has hx of PRBC during chemo 07/2024  - Noted Anti E, Anti-K Anti-C antibodies previously  - direct josefina IgG positive, eluate negative, not likely to be clinically significant   - s/p 1u PRBC 03/03/2025   - Monitor for bleeding  - Recommend to transfuse to maintain Hb > 7    # Klebsiella UTI  -Antibiotics per ID and primary team       Recommendations  - Trend CBC daily   - Transfuse to maintain Hb > 7   - Transfuse to maintain Plt >10k unless active bleeding then >50k   - Monitor renal function  - Cardiology follow up to address anticoagulation, currently on heparin drip   - f/u ongoing ICU recs         Thank you for allowing me to participate in the care of Mr. Gr please do not hesitate to call or text me if you have further questions or concerns.     Brayan Mart MD  [facility] [place]   Division of Hematology/Oncology  [address] [place] [address], [address]  [place] [address]  P: [phone]  F: [phone]    Attestation:    ----Pt evaluated including face-to-face interaction in addition to chart review, reviewing treatment plan, and managing the patient’s chronic diagnoses as listed in the assessment----

## 2025-03-10 LAB
-  AZTREONAM: SIGNIFICANT CHANGE UP
-  CEFEPIME: SIGNIFICANT CHANGE UP
-  CEFTAZIDIME: SIGNIFICANT CHANGE UP
-  CIPROFLOXACIN: SIGNIFICANT CHANGE UP
-  IMIPENEM: SIGNIFICANT CHANGE UP
-  LEVOFLOXACIN: SIGNIFICANT CHANGE UP
-  MEROPENEM: SIGNIFICANT CHANGE UP
-  PIPERACILLIN/TAZOBACTAM: SIGNIFICANT CHANGE UP
ALBUMIN SERPL ELPH-MCNC: 2.3 G/DL — LOW (ref 3.3–5)
ALP SERPL-CCNC: 129 U/L — HIGH (ref 40–120)
ALT FLD-CCNC: 27 U/L — SIGNIFICANT CHANGE UP (ref 10–45)
ANION GAP SERPL CALC-SCNC: 13 MMOL/L — SIGNIFICANT CHANGE UP (ref 5–17)
APTT BLD: 54.3 SEC — HIGH (ref 24.5–35.6)
AST SERPL-CCNC: 32 U/L — SIGNIFICANT CHANGE UP (ref 10–40)
BASOPHILS # BLD AUTO: 0.02 K/UL — SIGNIFICANT CHANGE UP (ref 0–0.2)
BASOPHILS # BLD AUTO: 0.02 K/UL — SIGNIFICANT CHANGE UP (ref 0–0.2)
BASOPHILS NFR BLD AUTO: 0.3 % — SIGNIFICANT CHANGE UP (ref 0–2)
BASOPHILS NFR BLD AUTO: 0.3 % — SIGNIFICANT CHANGE UP (ref 0–2)
BILIRUB SERPL-MCNC: 0.5 MG/DL — SIGNIFICANT CHANGE UP (ref 0.2–1.2)
BUN SERPL-MCNC: 62 MG/DL — HIGH (ref 7–23)
CALCIUM SERPL-MCNC: 9.3 MG/DL — SIGNIFICANT CHANGE UP (ref 8.4–10.5)
CHLORIDE SERPL-SCNC: 106 MMOL/L — SIGNIFICANT CHANGE UP (ref 96–108)
CO2 SERPL-SCNC: 23 MMOL/L — SIGNIFICANT CHANGE UP (ref 22–31)
CREAT SERPL-MCNC: 2.95 MG/DL — HIGH (ref 0.5–1.3)
CULTURE RESULTS: ABNORMAL
CULTURE RESULTS: ABNORMAL
EGFR: 23 ML/MIN/1.73M2 — LOW
EGFR: 23 ML/MIN/1.73M2 — LOW
EOSINOPHIL # BLD AUTO: 0.45 K/UL — SIGNIFICANT CHANGE UP (ref 0–0.5)
EOSINOPHIL # BLD AUTO: 0.54 K/UL — HIGH (ref 0–0.5)
EOSINOPHIL NFR BLD AUTO: 6.3 % — HIGH (ref 0–6)
EOSINOPHIL NFR BLD AUTO: 7.6 % — HIGH (ref 0–6)
GAS PNL BLDA: SIGNIFICANT CHANGE UP
GLUCOSE SERPL-MCNC: 122 MG/DL — HIGH (ref 70–99)
HCT VFR BLD CALC: 22.2 % — LOW (ref 39–50)
HCT VFR BLD CALC: 24.6 % — LOW (ref 39–50)
HGB BLD-MCNC: 6.6 G/DL — CRITICAL LOW (ref 13–17)
HGB BLD-MCNC: 7.7 G/DL — LOW (ref 13–17)
IMM GRANULOCYTES NFR BLD AUTO: 1.6 % — HIGH (ref 0–0.9)
IMM GRANULOCYTES NFR BLD AUTO: 2.1 % — HIGH (ref 0–0.9)
INR BLD: 1.11 RATIO — SIGNIFICANT CHANGE UP (ref 0.85–1.16)
LYMPHOCYTES # BLD AUTO: 0.6 K/UL — LOW (ref 1–3.3)
LYMPHOCYTES # BLD AUTO: 0.6 K/UL — LOW (ref 1–3.3)
LYMPHOCYTES # BLD AUTO: 8.4 % — LOW (ref 13–44)
LYMPHOCYTES # BLD AUTO: 8.5 % — LOW (ref 13–44)
MAGNESIUM SERPL-MCNC: 2.4 MG/DL — SIGNIFICANT CHANGE UP (ref 1.6–2.6)
MCHC RBC-ENTMCNC: 29.7 G/DL — LOW (ref 32–36)
MCHC RBC-ENTMCNC: 31.3 G/DL — LOW (ref 32–36)
MCHC RBC-ENTMCNC: 31.4 PG — SIGNIFICANT CHANGE UP (ref 27–34)
MCHC RBC-ENTMCNC: 31.6 PG — SIGNIFICANT CHANGE UP (ref 27–34)
MCV RBC AUTO: 100.4 FL — HIGH (ref 80–100)
MCV RBC AUTO: 106.2 FL — HIGH (ref 80–100)
METHOD TYPE: SIGNIFICANT CHANGE UP
MONOCYTES # BLD AUTO: 0.45 K/UL — SIGNIFICANT CHANGE UP (ref 0–0.9)
MONOCYTES # BLD AUTO: 0.48 K/UL — SIGNIFICANT CHANGE UP (ref 0–0.9)
MONOCYTES NFR BLD AUTO: 6.3 % — SIGNIFICANT CHANGE UP (ref 2–14)
MONOCYTES NFR BLD AUTO: 6.8 % — SIGNIFICANT CHANGE UP (ref 2–14)
NEUTROPHILS # BLD AUTO: 5.32 K/UL — SIGNIFICANT CHANGE UP (ref 1.8–7.4)
NEUTROPHILS # BLD AUTO: 5.46 K/UL — SIGNIFICANT CHANGE UP (ref 1.8–7.4)
NEUTROPHILS NFR BLD AUTO: 75.2 % — SIGNIFICANT CHANGE UP (ref 43–77)
NEUTROPHILS NFR BLD AUTO: 76.6 % — SIGNIFICANT CHANGE UP (ref 43–77)
NRBC BLD AUTO-RTO: 0 /100 WBCS — SIGNIFICANT CHANGE UP (ref 0–0)
NRBC BLD AUTO-RTO: 0 /100 WBCS — SIGNIFICANT CHANGE UP (ref 0–0)
ORGANISM # SPEC MICROSCOPIC CNT: ABNORMAL
PHOSPHATE SERPL-MCNC: 4.9 MG/DL — HIGH (ref 2.5–4.5)
PLATELET # BLD AUTO: 143 K/UL — LOW (ref 150–400)
PLATELET # BLD AUTO: 148 K/UL — LOW (ref 150–400)
POTASSIUM SERPL-MCNC: 4.5 MMOL/L — SIGNIFICANT CHANGE UP (ref 3.5–5.3)
POTASSIUM SERPL-SCNC: 4.5 MMOL/L — SIGNIFICANT CHANGE UP (ref 3.5–5.3)
PROT SERPL-MCNC: 6.2 G/DL — SIGNIFICANT CHANGE UP (ref 6–8.3)
PROTHROM AB SERPL-ACNC: 12.7 SEC — SIGNIFICANT CHANGE UP (ref 9.9–13.4)
RBC # BLD: 2.09 M/UL — LOW (ref 4.2–5.8)
RBC # BLD: 2.45 M/UL — LOW (ref 4.2–5.8)
RBC # FLD: 14.5 % — SIGNIFICANT CHANGE UP (ref 10.3–14.5)
RBC # FLD: 18.2 % — HIGH (ref 10.3–14.5)
SODIUM SERPL-SCNC: 142 MMOL/L — SIGNIFICANT CHANGE UP (ref 135–145)
SPECIMEN SOURCE: SIGNIFICANT CHANGE UP
SPECIMEN SOURCE: SIGNIFICANT CHANGE UP
WBC # BLD: 7.07 K/UL — SIGNIFICANT CHANGE UP (ref 3.8–10.5)
WBC # BLD: 7.13 K/UL — SIGNIFICANT CHANGE UP (ref 3.8–10.5)
WBC # FLD AUTO: 7.07 K/UL — SIGNIFICANT CHANGE UP (ref 3.8–10.5)
WBC # FLD AUTO: 7.13 K/UL — SIGNIFICANT CHANGE UP (ref 3.8–10.5)

## 2025-03-10 PROCEDURE — 76604 US EXAM CHEST: CPT | Mod: 26,GC

## 2025-03-10 PROCEDURE — 99291 CRITICAL CARE FIRST HOUR: CPT | Mod: 25

## 2025-03-10 PROCEDURE — 99292 CRITICAL CARE ADDL 30 MIN: CPT

## 2025-03-10 PROCEDURE — 86077 PHYS BLOOD BANK SERV XMATCH: CPT

## 2025-03-10 PROCEDURE — 93308 TTE F-UP OR LMTD: CPT | Mod: 26,GC

## 2025-03-10 RX ADMIN — Medication 1 APPLICATION(S): at 11:53

## 2025-03-10 RX ADMIN — LAMOTRIGINE 50 MILLIGRAM(S): 150 TABLET ORAL at 05:28

## 2025-03-10 RX ADMIN — LEVETIRACETAM 750 MILLIGRAM(S): 10 INJECTION, SOLUTION INTRAVENOUS at 18:36

## 2025-03-10 RX ADMIN — ERYTHROMYCIN 1 APPLICATION(S): 5 OINTMENT OPHTHALMIC at 18:19

## 2025-03-10 RX ADMIN — LAMOTRIGINE 50 MILLIGRAM(S): 150 TABLET ORAL at 18:18

## 2025-03-10 RX ADMIN — ERYTHROMYCIN 1 APPLICATION(S): 5 OINTMENT OPHTHALMIC at 05:29

## 2025-03-10 RX ADMIN — Medication 1000 UNIT(S): at 11:53

## 2025-03-10 RX ADMIN — LEVETIRACETAM 750 MILLIGRAM(S): 10 INJECTION, SOLUTION INTRAVENOUS at 05:29

## 2025-03-10 RX ADMIN — Medication 40 MILLIGRAM(S): at 11:53

## 2025-03-10 RX ADMIN — IPRATROPIUM BROMIDE AND ALBUTEROL SULFATE 3 MILLILITER(S): .5; 2.5 SOLUTION RESPIRATORY (INHALATION) at 05:14

## 2025-03-10 RX ADMIN — DEXMEDETOMIDINE HYDROCHLORIDE IN SODIUM CHLORIDE 4.46 MICROGRAM(S)/KG/HR: 4 INJECTION INTRAVENOUS at 21:31

## 2025-03-10 RX ADMIN — LACOSAMIDE 140 MILLIGRAM(S): 150 TABLET, FILM COATED ORAL at 05:28

## 2025-03-10 RX ADMIN — IPRATROPIUM BROMIDE AND ALBUTEROL SULFATE 3 MILLILITER(S): .5; 2.5 SOLUTION RESPIRATORY (INHALATION) at 17:06

## 2025-03-10 RX ADMIN — SEVELAMER HYDROCHLORIDE 1600 MILLIGRAM(S): 800 TABLET ORAL at 05:29

## 2025-03-10 RX ADMIN — ATORVASTATIN CALCIUM 20 MILLIGRAM(S): 80 TABLET, FILM COATED ORAL at 21:31

## 2025-03-10 RX ADMIN — NYSTATIN 1 APPLICATION(S): 100000 CREAM TOPICAL at 05:28

## 2025-03-10 RX ADMIN — GABAPENTIN 150 MILLIGRAM(S): 400 CAPSULE ORAL at 18:19

## 2025-03-10 RX ADMIN — POLYETHYLENE GLYCOL 3350 17 GRAM(S): 17 POWDER, FOR SOLUTION ORAL at 11:53

## 2025-03-10 RX ADMIN — ESCITALOPRAM OXALATE 15 MILLIGRAM(S): 20 TABLET ORAL at 08:13

## 2025-03-10 RX ADMIN — LURASIDONE HYDROCHLORIDE 20 MILLIGRAM(S): 120 TABLET, FILM COATED ORAL at 21:31

## 2025-03-10 RX ADMIN — SEVELAMER HYDROCHLORIDE 1600 MILLIGRAM(S): 800 TABLET ORAL at 13:04

## 2025-03-10 RX ADMIN — IPRATROPIUM BROMIDE AND ALBUTEROL SULFATE 3 MILLILITER(S): .5; 2.5 SOLUTION RESPIRATORY (INHALATION) at 11:07

## 2025-03-10 RX ADMIN — IPRATROPIUM BROMIDE AND ALBUTEROL SULFATE 3 MILLILITER(S): .5; 2.5 SOLUTION RESPIRATORY (INHALATION) at 23:34

## 2025-03-10 RX ADMIN — DEXMEDETOMIDINE HYDROCHLORIDE IN SODIUM CHLORIDE 4.46 MICROGRAM(S)/KG/HR: 4 INJECTION INTRAVENOUS at 08:14

## 2025-03-10 RX ADMIN — Medication 4 MILLILITER(S): at 05:15

## 2025-03-10 RX ADMIN — SEVELAMER HYDROCHLORIDE 1600 MILLIGRAM(S): 800 TABLET ORAL at 21:31

## 2025-03-10 RX ADMIN — ERYTHROMYCIN 1 APPLICATION(S): 5 OINTMENT OPHTHALMIC at 11:52

## 2025-03-10 RX ADMIN — GABAPENTIN 150 MILLIGRAM(S): 400 CAPSULE ORAL at 05:28

## 2025-03-10 RX ADMIN — Medication 15 MILLILITER(S): at 05:28

## 2025-03-10 RX ADMIN — Medication 10 MILLILITER(S): at 21:31

## 2025-03-10 RX ADMIN — Medication 15 MILLILITER(S): at 18:18

## 2025-03-10 RX ADMIN — LACOSAMIDE 140 MILLIGRAM(S): 150 TABLET, FILM COATED ORAL at 18:19

## 2025-03-10 RX ADMIN — ERYTHROMYCIN 1 APPLICATION(S): 5 OINTMENT OPHTHALMIC at 00:22

## 2025-03-10 RX ADMIN — NYSTATIN 1 APPLICATION(S): 100000 CREAM TOPICAL at 18:19

## 2025-03-10 NOTE — PROGRESS NOTE ADULT - ASSESSMENT
Patient is a 69 year old male with PMH of HTN, HLD, VAZQUEZ (on CPAP at bedtime, NC 2 liters during the day), CKD3, epilepsy, schizophrenia, developmental delay, metastatic testicular cancer (s/p orchiectomy, actively on chemotherapy, last dose of etoposide/cisplatin on 6/2024) presenting with worsening shortness of breath. Patient was recently hospitalized at [facility] from January 27th 2025 to February 6th 2025 for seizure and influenza virus infection, which required intubation. He was sent to rehab (originally from home) from hospital and now returns due to sudden onset shortness of breath and worsening oxygenation. Of note, he tested positive for COVID-19 at facility on 2/13/25 and was not put on any COVID-19 treatment at the time, only guaifenesin and scopolamine patch. Patient was placed on non-rebreather and sent to the hospital on 2/16/25. Noted initial discussion with patient/family and they decided to hold off on remdesivir given LFTs and SHAGUFTA.     Acute on chronic hypoxic respiratory failure  COVID-19 pneumonia, superimposed bacterial pneumonia  Acute on chronic HFpEF  Klebsiella UTI, treated   SHAGUFTA on CKD  Severe sepsis, hypotension, SHAGUFTA, likely due to aspiration pneumonia, treated  Now with fever with copious secretions post extubation - Pseudomonas pneumonia   - CT chest with extensive b/l ggo c/w COVID pneumonia; tracheomalacia   - MRSA PCR screen negative, s/p vancomycin   - 2/18 worsening resp status, requiring AVAPS, started on remdesivir   - 2/22 completed remdesivir x5d course   - 2/23 s/p RRT for inc WOB, s/p intubation, suspected aspiration   - 2/24 intubated, on sedation, pressor support, SHAGUFTA, WBC wnl   - 2/25 afebrile, off pressor, WBC wnl, Cr stable  - 2/26 CT chest with improved upper lobe ggo, new/worsening confluent areas of consolidation in mid-lower lungs   - 2/26 completed dexamethasone x10d  - 2/28 completed zosyn   - 3/4 s/p extubation, now on NC, copious secretions requiring frequent suctioning   - 3/5 started on erythromycin for conjunctivitis, culture grew Staph epi - S to erythromycin   - 3/7 febrile, WBC noted, secretions less but still a lot, worsening pulm findings, likely aspirating, started on zosyn    - s/p re-intubation and bronchoscopy with L mainstem mucous plug, thick secretions -send for culture    - 3/7 Scx noted with Pseudomonas aeruginosa -- now R to zosyn (h/o pansensitive Pseudomonas in Scx in 1/2025)   - 3/8 BAL culture with mod Pseudomonas   -  3/9 pm switched from zosyn to levofloxacin based on sensitivities   - 3/9 noted with cloudy urine, UA with pyuria   - 3/10 WBC normalized, Hb low, temps wnl, remains intubated     Recommendations:  Follow Ucx, in process   Continue on levofloxacin (renally dosed)  Continue on erythromycin x7d  Nephrology following, monitor Cr  Monitor temps/WBC  Supportive care  Aspiration precautions  Continue rest of care per primary team       Greyson Mart M.D.  [facility]  Available on [employer] TEAMS - *PREFERRED*  [phone]  After 5pm on weekdays and all day on weekends - please call [phone]     Thank you for consulting us and involving us in the management of this patients case. In addition to reviewing history, imaging, documents, labs, microbiology, took into account antibiotic stewardship, local antibiogram and infection control strategies and potential transmission issues.

## 2025-03-10 NOTE — PROGRESS NOTE ADULT - ASSESSMENT
69 year old male with a past medical history of hypertension, hyperlipemia VAZQUEZ (on CPAP at bedtime, NC 2 liters during the day), CKD stage 3, epilepsy, schizophrenia, developmental delay, metastatic testicular cancer (s/p orchiectomy, actively on chemotherapy, last dose of etoposide/cisplatin on 6/2024) presenting with worsening shortness of breath. Patient was recently hospitalized at [facility] from January 27th 2025 to February 6th 2025 for seizure and influenza virus infection, which required intubation. He was sent to rehab (originally from home) from hospital. He returns due to sudden onset shortness of breath and worsening oxygenation. Of note, he tested positive for COVID-19       1- SHAGUFTA on CKDIII  2- covid-19  3- anemia  4- hypotension  5- respiratory failure         SHAGUFTA suspected in setting of new infection. covid 19   cr and bun rising again now starting to plateau again   bp is better   trend cr and lytes   hyperphosphatemia   renvela 1600 mg tid  anemia, trend hb add retacrit [id number] U tiw sq

## 2025-03-10 NOTE — PROGRESS NOTE ADULT - ATTENDING COMMENTS
Patient examined and case reviewed in detail on bedside rounds   Patient is critically ill and unstable with aspiration PNA/failed extubation/testiticular ca on chemo D/W family tracheostomy     I provided 110 minutes of critical care today for this patient. This excludes time spent on procedures and teaching

## 2025-03-10 NOTE — CHART NOTE - NSCHARTNOTEFT_GEN_A_CORE
NUTRITION FOLLOW UP NOTE    PATIENT SEEN FOR: consult for tube feeding.    SOURCE: [] Patient  [x] Current Medical Record  [x] RN  [] Family/support person at bedside  [x] Patient unavailable/inappropriate  [x] Other: interdisciplinary team     CHART REVIEWED/EVENTS NOTED.  [] No changes to nutrition care plan to note  [x] Nutrition Status:  - Pt extubated on 3/4, intermittently requiring BiPAP however reintubated 3/8. Sedated on Precedex.  - SLP evaluation 3/6, recommended to remain NPO    DIET ORDER:   Diet, NPO with Tube Feed:   Tube Feeding Modality: Nasogastric  Jevity 1.2 Garth (JEVITY1.2RTH)  Total Volume for 24 Hours (mL): 1080  Continuous  Starting Tube Feed Rate {mL per Hour}: 10  Increase Tube Feed Rate by (mL): 10     Every 4 hours  Until Goal Tube Feed Rate (mL per Hour): 60  Tube Feed Duration (in Hours): 18  Tube Feed Start Time: 11:00 (25)    CURRENT DIET ORDER IS:  [] Appropriate:  [] Inadequate:  [x] Other: See recommendations below.     NUTRITION INTAKE/PROVISION:  [] PO:  [x] Enteral Nutrition: ENTERAL NUTRITION  EN Order Provides:  1080 mL formula, 1296 kcal, 60 g protein, 872 mL free water.  Protein Modular(s) Provides: N/A  Current Pump Rate: off per 18hr order  EN provision: 5 Day Average = 112mL or 134kcal  ***Pt meeting <50% estimated energy needs x 5 days.  [] Parenteral Nutrition:    ANTHROPOMETRICS:  Drug Dosing Weight  Height (cm): 167.6 (01 Mar 2025 20:00)  Weight (kg): 89.1 (01 Mar 2025 20:00)  BMI (kg/m2): 31.7 (01 Mar 2025 20:00)  BSA (m2): 1.98 (01 Mar 2025 20:00)    Weights:   Daily Weight in k (-08)     MEDICATIONS:  MEDICATIONS  (STANDING):  albuterol/ipratropium for Nebulization 3 milliLiter(s) Nebulizer every 6 hours  atorvastatin 20 milliGRAM(s) Oral at bedtime  chlorhexidine 0.12% Liquid 15 milliLiter(s) Oral Mucosa every 12 hours  cholecalciferol 1000 Unit(s) Oral daily  dexMEDEtomidine Infusion 0.2 MICROgram(s)/kG/Hr (4.46 mL/Hr) IV Continuous <Continuous>  erythromycin   Ointment 1 Application(s) Both EYES four times a day  escitalopram 15 milliGRAM(s) Oral with breakfast  fentaNYL   Infusion... 0.5 MICROgram(s)/kG/Hr (2.23 mL/Hr) IV Continuous <Continuous>  gabapentin Solution 150 milliGRAM(s) Oral every 12 hours  influenza  Vaccine (HIGH DOSE) 0.5 milliLiter(s) IntraMuscular once  lacosamide IVPB 200 milliGRAM(s) IV Intermittent every 12 hours  lamoTRIgine 50 milliGRAM(s) Oral two times a day  lanolin Ointment 1 Application(s) Topical daily  levETIRAcetam   Injectable 750 milliGRAM(s) IV Push every 12 hours  levoFLOXacin IVPB      lurasidone 20 milliGRAM(s) Oral at bedtime  norepinephrine Infusion 0.05 MICROgram(s)/kG/Min (8.35 mL/Hr) IV Continuous <Continuous>  nystatin Powder 1 Application(s) Topical two times a day  pantoprazole  Injectable 40 milliGRAM(s) IV Push daily  polyethylene glycol 3350 17 Gram(s) Oral daily  propofol Infusion 30 MICROgram(s)/kG/Min (16 mL/Hr) IV Continuous <Continuous>  senna Syrup 10 milliLiter(s) Oral at bedtime  sevelamer carbonate Powder 1600 milliGRAM(s) Oral every 8 hours  sodium chloride 3%  Inhalation 4 milliLiter(s) Inhalation every 6 hours    MEDICATIONS  (PRN):  artificial tears (preservative free) Ophthalmic Solution 1 Drop(s) Both EYES every 6 hours PRN Dry Eyes  tranexamic acid Injectable for Topical Use 5 milliLiter(s) Topical once PRN Excessive Lip Bleeding    NUTRITIONALLY PERTINENT LABS:  03-10 Na142 mmol/L Glu 122 mg/dL[H] K+ 4.5 mmol/L Cr  2.95 mg/dL[H] BUN 62 mg/dL[H] 03-10 Phos 4.9 mg/dL[H] 03-10 Alb 2.3 g/dL[L]03-10 ALT 27 U/L AST 32 U/L Alkaline Phosphatase 129 U/L[H]    Finger Sticks: N/A    NUTRITIONALLY PERTINENT MEDICATIONS/LABS:  [x] Reviewed  [x] Relevant notes on medications/labs:  - SHAGUFTA on CKD3, ordered for phosphate binders  - Pressors currently off    EDEMA:  [x] Reviewed  [x] Relevant notes: 2+ generalized    GI/ I&O:  [x] Reviewed  [x] Relevant notes: Last BM 3/8 x 2.   [] Other:    SKIN:   [x] No pressure injuries documented, per nursing flowsheet  [] Pressure injury previously noted  [] Change in pressure injury documentation:  [] Other:    ESTIMATED NEEDS:  [x] No change:  [] Updated:  Energy: 1596 - 2040kcal/day (18 - 23 kcal/kg)  Protein: 77 - 97g/day (1.2 - 1.5 g/kg)  Fluid:   ml/day or [x] defer to team  Based on: weight of 88.8kg () for energy needs, IBW 64.4kg for protein needs  [place] Equation: 1729kcal (19kcal/kg)    NUTRITION DIAGNOSIS:  [x] Prior Dx: Severe Acute Malnutrition   [] New Dx:    EDUCATION:  [] Yes:  [x] Not appropriate/warranted    NUTRITION CARE PLAN:  1. Consider change in feeds to Nepro with goal rate 55mL/hr x 18hr to provide: 990mL volume, 1782kcal, 80g protein, 720mL free water. Meets 20kcal/kg based on wt of 88.8kg, 1.2g/kg protein based on IBW 64.4kg. Free water per team.  2. Add Nephro-Anthony if not otherwise contraindicated.     [] Achieved - Continue current nutrition intervention(s)  [] Current medical condition precludes nutrition intervention at this time.    MONITORING AND EVALUATION:   RD remains available upon request and will follow up per protocol.    Tracy Shafer MS, RD, CDN, CNSC  Available on [employer] TEAMS

## 2025-03-10 NOTE — PROGRESS NOTE ADULT - PROBLEM SELECTOR PLAN 3
-CT chest with b/l GGO, new since 1/30/25. Likely COVID PNA +/- superimposed bacterial PNA  -S/p ABX  -ID f/u.  3/9: on iv zosyn again  3/10; changed to levofloxacin

## 2025-03-10 NOTE — PROGRESS NOTE ADULT - SUBJECTIVE AND OBJECTIVE BOX
[facility]   [phone]  RENAL FOLLOW UP NOTE  --------------------------------------------------------------------------------  Chief Complaint:    24 hour events/subjective:    seen earlier   intubated     PAST HISTORY  --------------------------------------------------------------------------------  No significant changes to PMH, PSH, FHx, SHx, unless otherwise noted    ALLERGIES & MEDICATIONS  --------------------------------------------------------------------------------  Allergies    seasonal allergies (Unknown)  No Known Drug Allergies    Intolerances    latex (Rash)    Standing Inpatient Medications  albuterol/ipratropium for Nebulization 3 milliLiter(s) Nebulizer every 6 hours  atorvastatin 20 milliGRAM(s) Oral at bedtime  chlorhexidine 0.12% Liquid 15 milliLiter(s) Oral Mucosa every 12 hours  cholecalciferol 1000 Unit(s) Oral daily  dexMEDEtomidine Infusion 0.2 MICROgram(s)/kG/Hr IV Continuous <Continuous>  erythromycin   Ointment 1 Application(s) Both EYES four times a day  escitalopram 15 milliGRAM(s) Oral with breakfast  gabapentin Solution 150 milliGRAM(s) Oral every 12 hours  influenza  Vaccine (HIGH DOSE) 0.5 milliLiter(s) IntraMuscular once  lacosamide IVPB 200 milliGRAM(s) IV Intermittent every 12 hours  lamoTRIgine 50 milliGRAM(s) Oral two times a day  lanolin Ointment 1 Application(s) Topical daily  levETIRAcetam   Injectable 750 milliGRAM(s) IV Push every 12 hours  levoFLOXacin IVPB      lurasidone 20 milliGRAM(s) Oral at bedtime  nystatin Powder 1 Application(s) Topical two times a day  pantoprazole  Injectable 40 milliGRAM(s) IV Push daily  polyethylene glycol 3350 17 Gram(s) Oral daily  senna Syrup 10 milliLiter(s) Oral at bedtime  sevelamer carbonate Powder 1600 milliGRAM(s) Oral every 8 hours    PRN Inpatient Medications  artificial tears (preservative free) Ophthalmic Solution 1 Drop(s) Both EYES every 6 hours PRN      REVIEW OF SYSTEMS  --------------------------------------------------------------------------------        VITALS/PHYSICAL EXAM  --------------------------------------------------------------------------------  T(C): 37 (03-10-25 @ 16:00), Max: 37.4 (03-10-25 @ 00:00)  HR: 60 (03-10-25 @ 19:00) (58 - 99)  BP: 118/65 (03-10-25 @ 19:00) (94/54 - 172/79)  RR: 24 (03-10-25 @ 19:00) (16 - 38)  SpO2: 99% (03-10-25 @ 19:00) (96% - 100%)  Wt(kg): --        03-09-25 @ 07:01  -  03-10-25 @ 07:00  --------------------------------------------------------  IN: 2563.5 mL / OUT: 825 mL / NET: 1738.5 mL    03-10-25 @ 07:01  -  03-10-25 @ 20:36  --------------------------------------------------------  IN: 906.7 mL / OUT: 270 mL / NET: 636.7 mL      Physical Exam:    Gen: ill appearing male,  remains intubated   	Pulm: decrease bs, +coarse bs    	CV: No JVD. RRR, S1S2; no rub  	Abd: +BS, soft, nondistended  	UE: Warm, no cyanosis  no clubbing,  no edema;  	LE: Warm, no cyanosis  no clubbing, no edema    LABS/STUDIES  --------------------------------------------------------------------------------              7.7    7.13  >-----------<  143      [03-10-25 @ 08:32]              24.6     142  |  106  |  62  ----------------------------<  122      [03-10-25 @ 00:18]  4.5   |  23  |  2.95        Ca     9.3     [03-10-25 @ 00:18]      Mg     2.4     [03-10-25 @ 00:18]      Phos  4.9     [03-10-25 @ 00:18]    TPro  6.2  /  Alb  2.3  /  TBili  0.5  /  DBili  x   /  AST  32  /  ALT  27  /  AlkPhos  129  [03-10-25 @ 00:18]    PT/INR: PT 12.7 , INR 1.11       [03-10-25 @ 00:18]  PTT: 54.3       [03-10-25 @ 00:18]      Creatinine Trend:  SCr 2.95 [03-10 @ 00:18]  SCr 2.92 [03-09 @ 00:27]  SCr 2.40 [03-08 @ 00:16]  SCr 2.52 [03-06 @ 23:42]  SCr 2.92 [03-06 @ 00:11]              Ferritin 5943      [02-23-25 @ 06:15]  TSH 0.87      [01-25-25 @ 11:31]

## 2025-03-10 NOTE — PROGRESS NOTE ADULT - SUBJECTIVE AND OBJECTIVE BOX
SUBJECTIVE/ OVERNIGHT EVENTS:  awake alert  follow commands  on sedation vacation  plan for trach per ICU team given multiple failed extubations      --------------------------------------------------------------------------------------------  LABS:                        7.7    7.13  )-----------( 143      ( 10 Mar 2025 08:32 )             24.6     03-10    142  |  106  |  62[H]  ----------------------------<  122[H]  4.5   |  23  |  2.95[H]    Ca    9.3      10 Mar 2025 00:18  Phos  4.9     03-10  Mg     2.4     03-10    TPro  6.2  /  Alb  2.3[L]  /  TBili  0.5  /  DBili  x   /  AST  32  /  ALT  27  /  AlkPhos  129[H]  03-10    PT/INR - ( 10 Mar 2025 00:18 )   PT: 12.7 sec;   INR: 1.11 ratio         PTT - ( 10 Mar 2025 00:18 )  PTT:54.3 sec  CAPILLARY BLOOD GLUCOSE            Urinalysis Basic - ( 10 Mar 2025 00:18 )    Color: x / Appearance: x / SG: x / pH: x  Gluc: 122 mg/dL / Ketone: x  / Bili: x / Urobili: x   Blood: x / Protein: x / Nitrite: x   Leuk Esterase: x / RBC: x / WBC x   Sq Epi: x / Non Sq Epi: x / Bacteria: x        RADIOLOGY & ADDITIONAL TESTS:    Imaging Personally Reviewed:  [x] YES  [ ] NO    Consultant(s) Notes Reviewed:  [x] YES  [ ] NO    MEDICATIONS  (STANDING):  albuterol/ipratropium for Nebulization 3 milliLiter(s) Nebulizer every 6 hours  atorvastatin 20 milliGRAM(s) Oral at bedtime  chlorhexidine 0.12% Liquid 15 milliLiter(s) Oral Mucosa every 12 hours  cholecalciferol 1000 Unit(s) Oral daily  dexMEDEtomidine Infusion 0.2 MICROgram(s)/kG/Hr (4.46 mL/Hr) IV Continuous <Continuous>  erythromycin   Ointment 1 Application(s) Both EYES four times a day  escitalopram 15 milliGRAM(s) Oral with breakfast  gabapentin Solution 150 milliGRAM(s) Oral every 12 hours  influenza  Vaccine (HIGH DOSE) 0.5 milliLiter(s) IntraMuscular once  lacosamide IVPB 200 milliGRAM(s) IV Intermittent every 12 hours  lamoTRIgine 50 milliGRAM(s) Oral two times a day  lanolin Ointment 1 Application(s) Topical daily  levETIRAcetam   Injectable 750 milliGRAM(s) IV Push every 12 hours  levoFLOXacin IVPB      lurasidone 20 milliGRAM(s) Oral at bedtime  nystatin Powder 1 Application(s) Topical two times a day  pantoprazole  Injectable 40 milliGRAM(s) IV Push daily  polyethylene glycol 3350 17 Gram(s) Oral daily  senna Syrup 10 milliLiter(s) Oral at bedtime  sevelamer carbonate Powder 1600 milliGRAM(s) Oral every 8 hours    MEDICATIONS  (PRN):  artificial tears (preservative free) Ophthalmic Solution 1 Drop(s) Both EYES every 6 hours PRN Dry Eyes  tranexamic acid Injectable for Topical Use 5 milliLiter(s) Topical once PRN Excessive Lip Bleeding      Care Discussed with Consultants/Other Providers [x] YES  [ ] NO    Vital Signs Last 24 Hrs  T(C): 36.5 (10 Mar 2025 12:00), Max: 37.5 (09 Mar 2025 20:00)  T(F): 97.7 (10 Mar 2025 12:00), Max: 99.5 (09 Mar 2025 20:00)  HR: 72 (10 Mar 2025 16:20) (58 - 85)  BP: 142/72 (10 Mar 2025 15:00) (82/50 - 142/72)  BP(mean): 100 (10 Mar 2025 15:00) (61 - 100)  RR: 19 (10 Mar 2025 15:00) (16 - 38)  SpO2: 99% (10 Mar 2025 16:20) (98% - 100%)    Parameters below as of 10 Mar 2025 11:20  Patient On (Oxygen Delivery Method): ventilator      I&O's Summary    09 Mar 2025 07:01  -  10 Mar 2025 07:00  --------------------------------------------------------  IN: 2563.5 mL / OUT: 825 mL / NET: 1738.5 mL    10 Mar 2025 07:01  -  10 Mar 2025 16:42  --------------------------------------------------------  IN: 503.4 mL / OUT: 270 mL / NET: 233.4 mL        PHYSICAL EXAM:  GENERAL: intubated, on sedation vacation, awake  HEAD:  Atraumatic, Normocephalic  EYES: EOMI, PERRLA, conjunctiva and sclera clear  NECK: Supple, No JVD  CHEST/LUNG: mild decrease breath sounds bilaterally; No wheeze   HEART: Regular rate and rhythm; No murmurs, rubs, or gallops  ABDOMEN: Soft, Nontender, Nondistended; Bowel sounds present  Neuro:  intubated, awake.   EXTREMITIES:  2+ Peripheral Pulses, No clubbing, cyanosis, + edema  SKIN: No rashes or lesions

## 2025-03-10 NOTE — PROGRESS NOTE ADULT - SUBJECTIVE AND OBJECTIVE BOX
INTERVAL HPI/OVERNIGHT EVENTS:  - sputum cx growing zosyn-resistant pseudomonas, zosyn switched to levaquin renally dosed    SUBJECTIVE: Patient seen and examined at bedside.       VITAL SIGNS:  ICU Vital Signs Last 24 Hrs  T(C): 37.1 (10 Mar 2025 04:00), Max: 37.5 (09 Mar 2025 20:00)  T(F): 98.8 (10 Mar 2025 04:00), Max: 99.5 (09 Mar 2025 20:00)  HR: 62 (10 Mar 2025 06:00) (62 - 102)  BP: 118/67 (10 Mar 2025 06:00) (82/50 - 161/77)  BP(mean): 88 (10 Mar 2025 06:00) (61 - 110)  ABP: --  ABP(mean): --  RR: 18 (10 Mar 2025 06:00) (16 - 47)  SpO2: 100% (10 Mar 2025 06:00) (97% - 100%)    O2 Parameters below as of 10 Mar 2025 05:21  Patient On (Oxygen Delivery Method): ventilator          Mode: AC/ CMV (Assist Control/ Continuous Mandatory Ventilation), RR (machine): 16, TV (machine): 450, FiO2: 60, PEEP: 6, ITime: 0.9, MAP: 9.5, PIP: 28  Plateau pressure:   P/F ratio:     03-09 @ 07:01  -  03-10 @ 07:00  --------------------------------------------------------  IN: 2563.5 mL / OUT: 825 mL / NET: 1738.5 mL      CAPILLARY BLOOD GLUCOSE      POCT Blood Glucose.: 123 mg/dL (08 Mar 2025 17:51)    ECG:    PHYSICAL EXAM:    General: intubated, sedated  HEENT: atraumatic, normocephalic. EOMI  Neck: supple, no JVD  Respiratory: CTAB, no increased work of breathing  Cardiovascular: RRR  Abdomen: soft, NT, ND  Extremities: 2+ peripheral pulses b/l  Neurological: sedated    MEDICATIONS:  MEDICATIONS  (STANDING):  albuterol/ipratropium for Nebulization 3 milliLiter(s) Nebulizer every 6 hours  atorvastatin 20 milliGRAM(s) Oral at bedtime  chlorhexidine 0.12% Liquid 15 milliLiter(s) Oral Mucosa every 12 hours  cholecalciferol 1000 Unit(s) Oral daily  dexMEDEtomidine Infusion 0.2 MICROgram(s)/kG/Hr (4.46 mL/Hr) IV Continuous <Continuous>  erythromycin   Ointment 1 Application(s) Both EYES four times a day  escitalopram 15 milliGRAM(s) Oral with breakfast  fentaNYL   Infusion... 0.5 MICROgram(s)/kG/Hr (2.23 mL/Hr) IV Continuous <Continuous>  gabapentin Solution 150 milliGRAM(s) Oral every 12 hours  influenza  Vaccine (HIGH DOSE) 0.5 milliLiter(s) IntraMuscular once  lacosamide IVPB 200 milliGRAM(s) IV Intermittent every 12 hours  lamoTRIgine 50 milliGRAM(s) Oral two times a day  lanolin Ointment 1 Application(s) Topical daily  levETIRAcetam   Injectable 750 milliGRAM(s) IV Push every 12 hours  levoFLOXacin IVPB      lurasidone 20 milliGRAM(s) Oral at bedtime  norepinephrine Infusion 0.05 MICROgram(s)/kG/Min (8.35 mL/Hr) IV Continuous <Continuous>  nystatin Powder 1 Application(s) Topical two times a day  pantoprazole  Injectable 40 milliGRAM(s) IV Push daily  polyethylene glycol 3350 17 Gram(s) Oral daily  propofol Infusion 30 MICROgram(s)/kG/Min (16 mL/Hr) IV Continuous <Continuous>  senna Syrup 10 milliLiter(s) Oral at bedtime  sevelamer carbonate Powder 1600 milliGRAM(s) Oral every 8 hours  sodium chloride 3%  Inhalation 4 milliLiter(s) Inhalation every 6 hours    MEDICATIONS  (PRN):  artificial tears (preservative free) Ophthalmic Solution 1 Drop(s) Both EYES every 6 hours PRN Dry Eyes  tranexamic acid Injectable for Topical Use 5 milliLiter(s) Topical once PRN Excessive Lip Bleeding      ALLERGIES:  Allergies    seasonal allergies (Unknown)  No Known Drug Allergies    Intolerances    latex (Rash)      LABS:                        6.6    7.07  )-----------( 148      ( 10 Mar 2025 00:18 )             22.2     03-10    142  |  106  |  62[H]  ----------------------------<  122[H]  4.5   |  23  |  2.95[H]    Ca    9.3      10 Mar 2025 00:18  Phos  4.9     03-10  Mg     2.4     03-10    TPro  6.2  /  Alb  2.3[L]  /  TBili  0.5  /  DBili  x   /  AST  32  /  ALT  27  /  AlkPhos  129[H]  03-10    PT/INR - ( 10 Mar 2025 00:18 )   PT: 12.7 sec;   INR: 1.11 ratio         PTT - ( 10 Mar 2025 00:18 )  PTT:54.3 sec  Urinalysis Basic - ( 10 Mar 2025 00:18 )    Color: x / Appearance: x / SG: x / pH: x  Gluc: 122 mg/dL / Ketone: x  / Bili: x / Urobili: x   Blood: x / Protein: x / Nitrite: x   Leuk Esterase: x / RBC: x / WBC x   Sq Epi: x / Non Sq Epi: x / Bacteria: x        RADIOLOGY & ADDITIONAL TESTS: Reviewed.   INTERVAL HPI/OVERNIGHT EVENTS:  - sputum cx growing zosyn-resistant pseudomonas, zosyn switched to levaquin renally dosed    SUBJECTIVE: Patient seen and examined at bedside. Remains intubated and sedated.       VITAL SIGNS:  ICU Vital Signs Last 24 Hrs  T(C): 37.1 (10 Mar 2025 04:00), Max: 37.5 (09 Mar 2025 20:00)  T(F): 98.8 (10 Mar 2025 04:00), Max: 99.5 (09 Mar 2025 20:00)  HR: 62 (10 Mar 2025 06:00) (62 - 102)  BP: 118/67 (10 Mar 2025 06:00) (82/50 - 161/77)  BP(mean): 88 (10 Mar 2025 06:00) (61 - 110)  ABP: --  ABP(mean): --  RR: 18 (10 Mar 2025 06:00) (16 - 47)  SpO2: 100% (10 Mar 2025 06:00) (97% - 100%)    O2 Parameters below as of 10 Mar 2025 05:21  Patient On (Oxygen Delivery Method): ventilator          Mode: AC/ CMV (Assist Control/ Continuous Mandatory Ventilation), RR (machine): 16, TV (machine): 450, FiO2: 60, PEEP: 6, ITime: 0.9, MAP: 9.5, PIP: 28  Plateau pressure:   P/F ratio:     03-09 @ 07:01  -  03-10 @ 07:00  --------------------------------------------------------  IN: 2563.5 mL / OUT: 825 mL / NET: 1738.5 mL      CAPILLARY BLOOD GLUCOSE      POCT Blood Glucose.: 123 mg/dL (08 Mar 2025 17:51)    ECG:    PHYSICAL EXAM:    General: intubated, sedated  HEENT: atraumatic, normocephalic. EOMI  Neck: supple, no JVD  Respiratory: CTAB, no increased work of breathing  Cardiovascular: RRR  Abdomen: soft, NT, ND  Extremities: 2+ peripheral pulses b/l  Neurological: sedated    MEDICATIONS:  MEDICATIONS  (STANDING):  albuterol/ipratropium for Nebulization 3 milliLiter(s) Nebulizer every 6 hours  atorvastatin 20 milliGRAM(s) Oral at bedtime  chlorhexidine 0.12% Liquid 15 milliLiter(s) Oral Mucosa every 12 hours  cholecalciferol 1000 Unit(s) Oral daily  dexMEDEtomidine Infusion 0.2 MICROgram(s)/kG/Hr (4.46 mL/Hr) IV Continuous <Continuous>  erythromycin   Ointment 1 Application(s) Both EYES four times a day  escitalopram 15 milliGRAM(s) Oral with breakfast  fentaNYL   Infusion... 0.5 MICROgram(s)/kG/Hr (2.23 mL/Hr) IV Continuous <Continuous>  gabapentin Solution 150 milliGRAM(s) Oral every 12 hours  influenza  Vaccine (HIGH DOSE) 0.5 milliLiter(s) IntraMuscular once  lacosamide IVPB 200 milliGRAM(s) IV Intermittent every 12 hours  lamoTRIgine 50 milliGRAM(s) Oral two times a day  lanolin Ointment 1 Application(s) Topical daily  levETIRAcetam   Injectable 750 milliGRAM(s) IV Push every 12 hours  levoFLOXacin IVPB      lurasidone 20 milliGRAM(s) Oral at bedtime  norepinephrine Infusion 0.05 MICROgram(s)/kG/Min (8.35 mL/Hr) IV Continuous <Continuous>  nystatin Powder 1 Application(s) Topical two times a day  pantoprazole  Injectable 40 milliGRAM(s) IV Push daily  polyethylene glycol 3350 17 Gram(s) Oral daily  propofol Infusion 30 MICROgram(s)/kG/Min (16 mL/Hr) IV Continuous <Continuous>  senna Syrup 10 milliLiter(s) Oral at bedtime  sevelamer carbonate Powder 1600 milliGRAM(s) Oral every 8 hours  sodium chloride 3%  Inhalation 4 milliLiter(s) Inhalation every 6 hours    MEDICATIONS  (PRN):  artificial tears (preservative free) Ophthalmic Solution 1 Drop(s) Both EYES every 6 hours PRN Dry Eyes  tranexamic acid Injectable for Topical Use 5 milliLiter(s) Topical once PRN Excessive Lip Bleeding      ALLERGIES:  Allergies    seasonal allergies (Unknown)  No Known Drug Allergies    Intolerances    latex (Rash)      LABS:                        6.6    7.07  )-----------( 148      ( 10 Mar 2025 00:18 )             22.2     03-10    142  |  106  |  62[H]  ----------------------------<  122[H]  4.5   |  23  |  2.95[H]    Ca    9.3      10 Mar 2025 00:18  Phos  4.9     03-10  Mg     2.4     03-10    TPro  6.2  /  Alb  2.3[L]  /  TBili  0.5  /  DBili  x   /  AST  32  /  ALT  27  /  AlkPhos  129[H]  03-10    PT/INR - ( 10 Mar 2025 00:18 )   PT: 12.7 sec;   INR: 1.11 ratio         PTT - ( 10 Mar 2025 00:18 )  PTT:54.3 sec  Urinalysis Basic - ( 10 Mar 2025 00:18 )    Color: x / Appearance: x / SG: x / pH: x  Gluc: 122 mg/dL / Ketone: x  / Bili: x / Urobili: x   Blood: x / Protein: x / Nitrite: x   Leuk Esterase: x / RBC: x / WBC x   Sq Epi: x / Non Sq Epi: x / Bacteria: x        RADIOLOGY & ADDITIONAL TESTS: Reviewed.

## 2025-03-10 NOTE — PROGRESS NOTE ADULT - NUTRITIONAL ASSESSMENT
This patient has been assessed with a concern for Malnutrition and has been determined to have a diagnosis/diagnoses of Severe protein-calorie malnutrition.    This patient is being managed with:   Diet NPO with Tube Feed-  Tube Feeding Modality: Nasogastric  Jevity 1.2 Garth (JEVITY1.2RTH)  Total Volume for 24 Hours (mL): 1080  Continuous  Starting Tube Feed Rate {mL per Hour}: 10  Increase Tube Feed Rate by (mL): 10     Every 4 hours  Until Goal Tube Feed Rate (mL per Hour): 60  Tube Feed Duration (in Hours): 18  Tube Feed Start Time: 11:00  Entered: Mar  9 2025 11:02AM

## 2025-03-10 NOTE — PROGRESS NOTE ADULT - SUBJECTIVE AND OBJECTIVE BOX
DATE OF SERVICE: 03-10-25 @ 17:53    Patient is a 69y old  Male who presents with a chief complaint of Acute on chronic hypoxemic respiratory failure (10 Mar 2025 14:42)      INTERVAL HISTORY: intubated and sedated    REVIEW OF SYSTEMS: unable to participate in ROS  CONSTITUTIONAL: No weakness  EYES/ENT: No visual changes;  No throat pain   NECK: No pain or stiffness  RESPIRATORY: No cough, wheezing; No shortness of breath  CARDIOVASCULAR: No chest pain or palpitations  GASTROINTESTINAL: No abdominal  pain. No nausea, vomiting, or hematemesis  GENITOURINARY: No dysuria, frequency or hematuria  NEUROLOGICAL: No stroke like symptoms  SKIN: No rashes    TELEMETRY Personally reviewed:  	  MEDICATIONS:        PHYSICAL EXAM:  T(C): 36.5 (03-10-25 @ 12:00), Max: 37.5 (03-09-25 @ 20:00)  HR: 98 (03-10-25 @ 17:24) (58 - 99)  BP: 142/72 (03-10-25 @ 15:00) (82/50 - 142/72)  RR: 19 (03-10-25 @ 15:00) (16 - 38)  SpO2: 99% (03-10-25 @ 17:24) (98% - 100%)  Wt(kg): --  I&O's Summary    09 Mar 2025 07:01  -  10 Mar 2025 07:00  --------------------------------------------------------  IN: 2563.5 mL / OUT: 825 mL / NET: 1738.5 mL    10 Mar 2025 07:01  -  10 Mar 2025 17:53  --------------------------------------------------------  IN: 503.4 mL / OUT: 270 mL / NET: 233.4 mL          Appearance: In no distress	  HEENT:    PERRL, EOMI	  Cardiovascular:  S1 S2, No JVD  Respiratory: Lungs clear to auscultation	  Gastrointestinal:  Soft, Non-tender, + BS	  Vascularature:  No edema of LE  Psychiatric: Appropriate affect   Neuro: no acute focal deficits                               7.7    7.13  )-----------( 143      ( 10 Mar 2025 08:32 )             24.6     03-10    142  |  106  |  62[H]  ----------------------------<  122[H]  4.5   |  23  |  2.95[H]    Ca    9.3      10 Mar 2025 00:18  Phos  4.9     03-10  Mg     2.4     03-10    TPro  6.2  /  Alb  2.3[L]  /  TBili  0.5  /  DBili  x   /  AST  32  /  ALT  27  /  AlkPhos  129[H]  03-10        Labs personally reviewed      ASSESSMENT/PLAN: 	    69 year old male with a past medical history of hypertension, hyperlipemia VAZQUEZ (on CPAP at bedtime, NC 2 liters during the day), CKD stage 3, epilepsy, schizophrenia, developmental delay, metastatic testicular cancer (s/p orchiectomy, actively on chemotherapy, last dose of etoposide/cisplatin on 6/2024), presenting with acute on chronic hypoxemic respiratory failure, secondary to (a) COVID-19 infection, (b) superimposed pneumonia after recent influenza infection, and/or (c) fluid overload.     Problem/Plan - 1:  ·  Problem: Acute on chronic respiratory failure with hypoxia.   ·  Plan: Likely 2/2 PNA, HF  - Appreciate pulmonology.  - Transferred to MICU for hypoxia. Appreciate MICU care.   - 2/24 started on Bumex gtt at 2mg/hr  - 2/26 No appreciable JVD or peripheral edema. BP now soft with labile but stable Cr. Does not appear to be a HF. Recommend de-escalation of diuretics.   --- 2/26 CT Chest shows new and worsening confluent areas of consolidation/pneumonia in the mid to lower lungs.      Problem/Plan - 2:  ·  Problem: SHAGUFTA (acute kidney injury).   ·  Plan: Has a history of CKD stage 3, Cr 2.38 at baseline. Presents with Cr 3.27.   - Nephrology following  - BUN now >115. 3/10 BUN improved to 62    Problem/Plan - 3:  ·  Problem: Chronic heart failure with preserved ejection fraction.   ·  Plan: TTE done here 1/17/25 technically difficult, but LV systolic fxn appears nl without any regional wall motion abnormalities  - Repeat TTE pending  - BNP 5000 <---1100 but appears euvolemic   - s/p bumex gtt per critical care    Problem/Plan - 4:  ·  Problem: New onset Afib RVR (on tele, confirmed with EKG 2/19)   ·  Plan: Started metoprolol 5mg IVP q6 hours & Hep gtt  - If rate remains uncontrolled, can start Cardizem drip at 5mg/h  - Initiated hep gtt and will transition to Eliquis. hep gtt held for trach placement     Problem/Plan - 5:  ·  Problem: HTN    ·  Plan: Start Hydral 25mg PO TID for elevated BP now that it has recovered    Problem/Plan - 6:  ·  Problem: Prophylactic measure.   ·  Plan:  DVT prophylaxis: heparin gtt          Iolani Behrbom, AG-NP   Gus Andrew DO [facility]  Cardiovascular Medicine  [address] [place] [address], [address]  Available through call or text on [employer] TE[employer]  Office: [phone]   DATE OF SERVICE: 03-10-25 @ 17:53    Patient is a 69y old  Male who presents with a chief complaint of Acute on chronic hypoxemic respiratory failure (10 Mar 2025 14:42)      INTERVAL HISTORY: intubated and sedated    REVIEW OF SYSTEMS: unable to participate in ROS  CONSTITUTIONAL: No weakness  EYES/ENT: No visual changes;  No throat pain   NECK: No pain or stiffness  RESPIRATORY: No cough, wheezing; No shortness of breath  CARDIOVASCULAR: No chest pain or palpitations  GASTROINTESTINAL: No abdominal  pain. No nausea, vomiting, or hematemesis  GENITOURINARY: No dysuria, frequency or hematuria  NEUROLOGICAL: No stroke like symptoms  SKIN: No rashes    TELEMETRY Personally reviewed:  	  MEDICATIONS:        PHYSICAL EXAM:  T(C): 36.5 (03-10-25 @ 12:00), Max: 37.5 (03-09-25 @ 20:00)  HR: 98 (03-10-25 @ 17:24) (58 - 99)  BP: 142/72 (03-10-25 @ 15:00) (82/50 - 142/72)  RR: 19 (03-10-25 @ 15:00) (16 - 38)  SpO2: 99% (03-10-25 @ 17:24) (98% - 100%)  Wt(kg): --  I&O's Summary    09 Mar 2025 07:01  -  10 Mar 2025 07:00  --------------------------------------------------------  IN: 2563.5 mL / OUT: 825 mL / NET: 1738.5 mL    10 Mar 2025 07:01  -  10 Mar 2025 17:53  --------------------------------------------------------  IN: 503.4 mL / OUT: 270 mL / NET: 233.4 mL          Appearance: In no distress	  HEENT:    PERRL, EOMI	  Cardiovascular:  S1 S2, No JVD  Respiratory: Lungs clear to auscultation	  Gastrointestinal:  Soft, Non-tender, + BS	  Vascularature:  No edema of LE  Psychiatric: Appropriate affect   Neuro: no acute focal deficits                               7.7    7.13  )-----------( 143      ( 10 Mar 2025 08:32 )             24.6     03-10    142  |  106  |  62[H]  ----------------------------<  122[H]  4.5   |  23  |  2.95[H]    Ca    9.3      10 Mar 2025 00:18  Phos  4.9     03-10  Mg     2.4     03-10    TPro  6.2  /  Alb  2.3[L]  /  TBili  0.5  /  DBili  x   /  AST  32  /  ALT  27  /  AlkPhos  129[H]  03-10        Labs personally reviewed      ASSESSMENT/PLAN: 	    69 year old male with a past medical history of hypertension, hyperlipemia VAZQUEZ (on CPAP at bedtime, NC 2 liters during the day), CKD stage 3, epilepsy, schizophrenia, developmental delay, metastatic testicular cancer (s/p orchiectomy, actively on chemotherapy, last dose of etoposide/cisplatin on 6/2024), presenting with acute on chronic hypoxemic respiratory failure, secondary to (a) COVID-19 infection, (b) superimposed pneumonia after recent influenza infection, and/or (c) fluid overload.     Problem/Plan - 1:  ·  Problem: Acute on chronic respiratory failure with hypoxia.   ·  Plan: Likely 2/2 PNA, HF  - Appreciate pulmonology.  - Transferred to MICU for hypoxia. Appreciate MICU care.   - 2/24 started on Bumex gtt at 2mg/hr  - 2/26 No appreciable JVD or peripheral edema. BP now soft with labile but stable Cr. Does not appear to be a HF. Recommend de-escalation of diuretics.   --- 2/26 CT Chest shows new and worsening confluent areas of consolidation/pneumonia in the mid to lower lungs.    Problem/Plan - 2:  ·  Problem: SHAGUFTA (acute kidney injury).   ·  Plan: Has a history of CKD stage 3, Cr 2.38 at baseline. Presents with Cr 3.27.   - Nephrology following  - BUN now >115. 3/10 BUN improved to 62    Problem/Plan - 3:  ·  Problem: Chronic heart failure with preserved ejection fraction.   ·  Plan: TTE done here 1/17/25 technically difficult, but LV systolic fxn appears nl without any regional wall motion abnormalities  - Repeat TTE pending  - BNP 5000 <---1100 but appears euvolemic   - s/p bumex gtt per ICU    Problem/Plan - 4:  ·  Problem: New onset Afib RVR (on tele, confirmed with EKG 2/19)   ·  Plan: Started metoprolol 5mg IVP q6 hours & Hep gtt  - If rate remains uncontrolled, can start Cardizem drip at 5mg/h  - Initiated hep gtt and will transition to Eliquis. hep gtt held for trach placement     Problem/Plan - 5:  ·  Problem: HTN    ·  Plan: Start Hydral 25mg PO TID for elevated BP now that it has recovered    Problem/Plan - 6:  ·  Problem: Prophylactic measure.   ·  Plan:  DVT prophylaxis: heparin gtt          Iolani Behrbom, AG-DARIAN Andrew DO [facility]  Cardiovascular Medicine  [address] [place] [address], [address]  Available through call or text on [employer] TEAM[employer]  Office: [phone]

## 2025-03-10 NOTE — PROGRESS NOTE ADULT - ASSESSMENT

## 2025-03-10 NOTE — PROGRESS NOTE ADULT - SUBJECTIVE AND OBJECTIVE BOX
[facility]  JACINTO Horton Y. Patel, S. Shah, G. Casimir  [phone]  ([phone] - weekdays after 5pm and weekends)    Name: OSCAR MILAN  Age/Gender: 69y Male  MRN: [id number]    Interval History:  Patient seen and examined this morning.   Remains intubated, sedated.   Notes reviewed. Afebrile.   Allergies: seasonal allergies (Unknown)  No Known Drug Allergies      Objective:  Vitals:   T(F): 98.2 (03-10-25 @ 08:00), Max: 99.5 (03-09-25 @ 20:00)  HR: 60 (03-10-25 @ 08:00) (60 - 102)  BP: 111/65 (03-10-25 @ 08:00) (82/50 - 161/77)  RR: 17 (03-10-25 @ 08:00) (16 - 47)  SpO2: 100% (03-10-25 @ 08:00) (97% - 100%)  Physical Examination:  General: no acute distress  HEENT: NC/AT, upper lip scab, ETT  Respiratory: decreased breath sounds bilaterally   Cardiovascular: S1 S2 present, normal rate   Gastrointestinal: soft, nondistended, nontender   Extremities: no LE edema, no cyanosis  Skin: no visible rash    Laboratory Studies:  CBC:                       6.6    7.07  )-----------( 148      ( 10 Mar 2025 00:18 )             22.2     WBC Trend:  7.07 03-10-25 @ 00:18  11.82 03-09-25 @ 00:27  13.54 03-08-25 @ 01:11  20.24 03-08-25 @ 00:16  14.83 03-06-25 @ 23:42  9.52 03-06-25 @ 00:18  8.84 03-05-25 @ 00:15  8.48 03-04-25 @ 00:40  7.06 03-03-25 @ 12:41    CMP: 03-10    142  |  106  |  62[H]  ----------------------------<  122[H]  4.5   |  23  |  2.95[H]    Ca    9.3      10 Mar 2025 00:18  Phos  4.9     03-10  Mg     2.4     03-10    TPro  6.2  /  Alb  2.3[L]  /  TBili  0.5  /  DBili  x   /  AST  32  /  ALT  27  /  AlkPhos  129[H]  03-10    Creatinine: 2.95 mg/dL (03-10-25 @ 00:18)  Creatinine: 2.92 mg/dL (03-09-25 @ 00:27)  Creatinine: 2.40 mg/dL (03-08-25 @ 00:16)  Creatinine: 2.52 mg/dL (03-06-25 @ 23:42)  Creatinine: 2.92 mg/dL (03-06-25 @ 00:11)  Creatinine: 3.31 mg/dL (03-05-25 @ 00:15)  Creatinine: 3.99 mg/dL (03-04-25 @ 00:26)    LIVER FUNCTIONS - ( 10 Mar 2025 00:18 )  Alb: 2.3 g/dL / Pro: 6.2 g/dL / ALK PHOS: 129 U/L / ALT: 27 U/L / AST: 32 U/L / GGT: x           Microbiology: reviewed   Culture - Bronchial (collected 03-08-25 @ 15:27)  Source: Bronchial Bronchial Lavage  Gram Stain (03-09-25 @ 00:20):    Moderate polymorphonuclear leukocytes per low power field    No Squamous epithelial cells per low power field    Moderate Gram Negative Rods per oil power field    Few Gram positive cocci in pairs per oil power field  Preliminary Report (03-09-25 @ 14:52):    Moderate Pseudomonas aeruginosa    Culture - Blood (collected 03-08-25 @ 00:00)  Source: Blood Blood  Preliminary Report (03-10-25 @ 04:01):    No growth at 48 Hours    Culture - Blood (collected 03-07-25 @ 23:30)  Source: Blood Blood  Preliminary Report (03-10-25 @ 04:01):    No growth at 48 Hours    Culture - Sputum (collected 03-07-25 @ 18:42)  Source: Sputum Sputum  Gram Stain (03-08-25 @ 07:01):    Rare polymorphonuclear leukocytes per low power field    Few Squamous epithelial cells per low power field    Moderate Gram Negative Rods seen per oil power field  Final Report (03-09-25 @ 16:20):    Numerous Pseudomonas aeruginosa    Commensal lucia consistent with body site  Organism: Pseudomonas aeruginosa (03-09-25 @ 16:20)  Organism: Pseudomonas aeruginosa (03-09-25 @ 16:20)      Method Type: MARIELENA      -  Aztreonam: S 8      -  Cefepime: S 8      -  Ceftazidime: S 8      -  Ciprofloxacin: S <=0.25      -  Imipenem: S <=1      -  Levofloxacin: S <=0.5      -  Meropenem: S <=1      -  Piperacillin/Tazobactam: R 64    Culture - Eye (collected 03-05-25 @ 16:15)  Source: Eye Conjunctiva-Right  Gram Stain (03-08-25 @ 00:03):    No polymorphonuclear cells seen    No organisms seen    by cytocentrifuge  Preliminary Report (03-08-25 @ 00:42):    Rare Staphylococcus epidermidis  Organism: Staphylococcus epidermidis (03-09-25 @ 19:00)  Organism: Staphylococcus epidermidis (03-09-25 @ 19:00)      Method Type: MARIELENA      -  Clindamycin: R 4      -  Erythromycin: S <=0.25      -  Gentamicin: S <=4 Should not be used as monotherapy      -  Oxacillin: R 2      -  Penicillin: R 1      -  Rifampin: S <=1 Should not be used as monotherapy      -  Tetracycline: S <=4      -  Trimethoprim/Sulfamethoxazole: S <=0.5/9.5      -  Vancomycin: S 1    Radiology: reviewed     Medications:  albuterol/ipratropium for Nebulization 3 milliLiter(s) Nebulizer every 6 hours  artificial tears (preservative free) Ophthalmic Solution 1 Drop(s) Both EYES every 6 hours PRN  atorvastatin 20 milliGRAM(s) Oral at bedtime  chlorhexidine 0.12% Liquid 15 milliLiter(s) Oral Mucosa every 12 hours  cholecalciferol 1000 Unit(s) Oral daily  dexMEDEtomidine Infusion 0.2 MICROgram(s)/kG/Hr IV Continuous <Continuous>  erythromycin   Ointment 1 Application(s) Both EYES four times a day  escitalopram 15 milliGRAM(s) Oral with breakfast  fentaNYL   Infusion... 0.5 MICROgram(s)/kG/Hr IV Continuous <Continuous>  gabapentin Solution 150 milliGRAM(s) Oral every 12 hours  influenza  Vaccine (HIGH DOSE) 0.5 milliLiter(s) IntraMuscular once  lacosamide IVPB 200 milliGRAM(s) IV Intermittent every 12 hours  lamoTRIgine 50 milliGRAM(s) Oral two times a day  lanolin Ointment 1 Application(s) Topical daily  levETIRAcetam   Injectable 750 milliGRAM(s) IV Push every 12 hours  levoFLOXacin IVPB      lurasidone 20 milliGRAM(s) Oral at bedtime  norepinephrine Infusion 0.05 MICROgram(s)/kG/Min IV Continuous <Continuous>  nystatin Powder 1 Application(s) Topical two times a day  pantoprazole  Injectable 40 milliGRAM(s) IV Push daily  polyethylene glycol 3350 17 Gram(s) Oral daily  propofol Infusion 30 MICROgram(s)/kG/Min IV Continuous <Continuous>  senna Syrup 10 milliLiter(s) Oral at bedtime  sevelamer carbonate Powder 1600 milliGRAM(s) Oral every 8 hours  sodium chloride 3%  Inhalation 4 milliLiter(s) Inhalation every 6 hours  tranexamic acid Injectable for Topical Use 5 milliLiter(s) Topical once PRN    Current Antimicrobials:  levoFLOXacin IVPB        Prior/Completed Antimicrobials:  levoFLOXacin IVPB  piperacillin/tazobactam IVPB.  piperacillin/tazobactam IVPB.-  piperacillin/tazobactam IVPB.-  piperacillin/tazobactam IVPB..  piperacillin/tazobactam IVPB..  piperacillin/tazobactam IVPB...  remdesivir  IVPB  remdesivir  IVPB  remdesivir  IVPB  vancomycin  IVPB.

## 2025-03-10 NOTE — PROGRESS NOTE ADULT - PROBLEM SELECTOR PLAN 1
-Recent admission for acute respiratory failure in the setting of grand mal seizures, influenza, PNA. S/p intubation in MICU, was extubated to [facility]. Now COVID +  -CT chest with b/l GGO, new since 1/30/25. Likely COVID PNA +/- superimposed bacterial PNA.   -Increase in O2 requirements - started on HFNC 2/18  -S/p multiple RRTs for hypoxia, increased WOB in the setting of COVID, PNA, fluid overload. Intubated s/p RRT on 2/23 & transferred to MICU  -Extubated 3/4   -Continue bronchodilators  -Completed ABX   -Still with difficulty managing secretions, GOC discussions ongoing   -Keep O>I as tolerated, s/p Bumex drip   -Keep sats >90% with O2 PRN.  3/9: he got reintubated: now for trach next week  3/10: remains intubated ? now needs trach :  defer to MICU team

## 2025-03-10 NOTE — PROGRESS NOTE ADULT - SUBJECTIVE AND OBJECTIVE BOX
[facility] HEMATOLOGY/ONCOLOGY INPATIENT PROGRESS NOTE     Interval Hx:   03-10-25: Mr. Gr was seen at bedside today.    Meds:   MEDICATIONS  (STANDING):  albuterol/ipratropium for Nebulization 3 milliLiter(s) Nebulizer every 6 hours  atorvastatin 20 milliGRAM(s) Oral at bedtime  chlorhexidine 0.12% Liquid 15 milliLiter(s) Oral Mucosa every 12 hours  cholecalciferol 1000 Unit(s) Oral daily  dexMEDEtomidine Infusion 0.2 MICROgram(s)/kG/Hr (4.46 mL/Hr) IV Continuous <Continuous>  erythromycin   Ointment 1 Application(s) Both EYES four times a day  escitalopram 15 milliGRAM(s) Oral with breakfast  fentaNYL   Infusion... 0.5 MICROgram(s)/kG/Hr (2.23 mL/Hr) IV Continuous <Continuous>  gabapentin Solution 150 milliGRAM(s) Oral every 12 hours  influenza  Vaccine (HIGH DOSE) 0.5 milliLiter(s) IntraMuscular once  lacosamide IVPB 200 milliGRAM(s) IV Intermittent every 12 hours  lamoTRIgine 50 milliGRAM(s) Oral two times a day  lanolin Ointment 1 Application(s) Topical daily  levETIRAcetam   Injectable 750 milliGRAM(s) IV Push every 12 hours  levoFLOXacin IVPB      lurasidone 20 milliGRAM(s) Oral at bedtime  norepinephrine Infusion 0.05 MICROgram(s)/kG/Min (8.35 mL/Hr) IV Continuous <Continuous>  nystatin Powder 1 Application(s) Topical two times a day  pantoprazole  Injectable 40 milliGRAM(s) IV Push daily  polyethylene glycol 3350 17 Gram(s) Oral daily  propofol Infusion 30 MICROgram(s)/kG/Min (16 mL/Hr) IV Continuous <Continuous>  senna Syrup 10 milliLiter(s) Oral at bedtime  sevelamer carbonate Powder 1600 milliGRAM(s) Oral every 8 hours  sodium chloride 3%  Inhalation 4 milliLiter(s) Inhalation every 6 hours    MEDICATIONS  (PRN):  artificial tears (preservative free) Ophthalmic Solution 1 Drop(s) Both EYES every 6 hours PRN Dry Eyes  tranexamic acid Injectable for Topical Use 5 milliLiter(s) Topical once PRN Excessive Lip Bleeding    Vital Signs Last 24 Hrs  T(C): 37.1 (10 Mar 2025 04:00), Max: 37.5 (09 Mar 2025 20:00)  T(F): 98.8 (10 Mar 2025 04:00), Max: 99.5 (09 Mar 2025 20:00)  HR: 63 (10 Mar 2025 04:00) (63 - 668)  BP: 112/66 (10 Mar 2025 04:00) (74/37 - 161/77)  BP(mean): 85 (10 Mar 2025 04:00) (50 - 110)  RR: 17 (10 Mar 2025 04:00) (16 - 47)  SpO2: 100% (10 Mar 2025 04:00) (97% - 100%)    Parameters below as of 09 Mar 2025 23:51  Patient On (Oxygen Delivery Method): ventilator    Physical Exam:  Gen: NAD, inubated, sedated   HEENT: EOMI, MMM  Chest: equal chest rise  Cardiac: regular   Abd: non distended    Labs:                        6.6    7.07  )-----------( 148      ( 10 Mar 2025 00:18 )             22.2     CBC Full  -  ( 10 Mar 2025 00:18 )  WBC Count : 7.07 K/uL  RBC Count : 2.09 M/uL  Hemoglobin : 6.6 g/dL  Hematocrit : 22.2 %  Platelet Count - Automated : 148 K/uL  Mean Cell Volume : 106.2 fl  Mean Cell Hemoglobin : 31.6 pg  Mean Cell Hemoglobin Concentration : 29.7 g/dL    03-10    142  |  106  |  62[H]  ----------------------------<  122[H]  4.5   |  23  |  2.95[H]    Ca    9.3      10 Mar 2025 00:18  Phos  4.9     03-10  Mg     2.4     03-10    TPro  6.2  /  Alb  2.3[L]  /  TBili  0.5  /  DBili  x   /  AST  32  /  ALT  27  /  AlkPhos  129[H]  03-10    PT/INR - ( 10 Mar 2025 00:18 )   PT: 12.7 sec;   INR: 1.11 ratio         PTT - ( 10 Mar 2025 00:18 )  PTT:54.3 sec  Bilirubin Total: 0.5 mg/dL (03-10-25 @ 00:18)   [facility] HEMATOLOGY/ONCOLOGY INPATIENT PROGRESS NOTE     Interval Hx:   03-10-25: Mr. Gr was seen at bedside today, continues in MICU, no signs of bleeding, discussed with nursing staff at bedside, receiving 1u PRBC due to H/H downtrend, sputum culture with Pseudomonas, ICU and ID recs noted, continues to be intubated     Meds:   MEDICATIONS  (STANDING):  albuterol/ipratropium for Nebulization 3 milliLiter(s) Nebulizer every 6 hours  atorvastatin 20 milliGRAM(s) Oral at bedtime  chlorhexidine 0.12% Liquid 15 milliLiter(s) Oral Mucosa every 12 hours  cholecalciferol 1000 Unit(s) Oral daily  dexMEDEtomidine Infusion 0.2 MICROgram(s)/kG/Hr (4.46 mL/Hr) IV Continuous <Continuous>  erythromycin   Ointment 1 Application(s) Both EYES four times a day  escitalopram 15 milliGRAM(s) Oral with breakfast  fentaNYL   Infusion... 0.5 MICROgram(s)/kG/Hr (2.23 mL/Hr) IV Continuous <Continuous>  gabapentin Solution 150 milliGRAM(s) Oral every 12 hours  influenza  Vaccine (HIGH DOSE) 0.5 milliLiter(s) IntraMuscular once  lacosamide IVPB 200 milliGRAM(s) IV Intermittent every 12 hours  lamoTRIgine 50 milliGRAM(s) Oral two times a day  lanolin Ointment 1 Application(s) Topical daily  levETIRAcetam   Injectable 750 milliGRAM(s) IV Push every 12 hours  levoFLOXacin IVPB      lurasidone 20 milliGRAM(s) Oral at bedtime  norepinephrine Infusion 0.05 MICROgram(s)/kG/Min (8.35 mL/Hr) IV Continuous <Continuous>  nystatin Powder 1 Application(s) Topical two times a day  pantoprazole  Injectable 40 milliGRAM(s) IV Push daily  polyethylene glycol 3350 17 Gram(s) Oral daily  propofol Infusion 30 MICROgram(s)/kG/Min (16 mL/Hr) IV Continuous <Continuous>  senna Syrup 10 milliLiter(s) Oral at bedtime  sevelamer carbonate Powder 1600 milliGRAM(s) Oral every 8 hours  sodium chloride 3%  Inhalation 4 milliLiter(s) Inhalation every 6 hours    MEDICATIONS  (PRN):  artificial tears (preservative free) Ophthalmic Solution 1 Drop(s) Both EYES every 6 hours PRN Dry Eyes  tranexamic acid Injectable for Topical Use 5 milliLiter(s) Topical once PRN Excessive Lip Bleeding    Vital Signs Last 24 Hrs  T(C): 37.1 (10 Mar 2025 04:00), Max: 37.5 (09 Mar 2025 20:00)  T(F): 98.8 (10 Mar 2025 04:00), Max: 99.5 (09 Mar 2025 20:00)  HR: 63 (10 Mar 2025 04:00) (63 - 668)  BP: 112/66 (10 Mar 2025 04:00) (74/37 - 161/77)  BP(mean): 85 (10 Mar 2025 04:00) (50 - 110)  RR: 17 (10 Mar 2025 04:00) (16 - 47)  SpO2: 100% (10 Mar 2025 04:00) (97% - 100%)    Parameters below as of 09 Mar 2025 23:51  Patient On (Oxygen Delivery Method): ventilator    Physical Exam:  Gen: NAD, inubated, sedated   HEENT: EOMI, MMM  Chest: equal chest rise  Cardiac: regular   Abd: non distended    Labs:                        6.6    7.07  )-----------( 148      ( 10 Mar 2025 00:18 )             22.2     CBC Full  -  ( 10 Mar 2025 00:18 )  WBC Count : 7.07 K/uL  RBC Count : 2.09 M/uL  Hemoglobin : 6.6 g/dL  Hematocrit : 22.2 %  Platelet Count - Automated : 148 K/uL  Mean Cell Volume : 106.2 fl  Mean Cell Hemoglobin : 31.6 pg  Mean Cell Hemoglobin Concentration : 29.7 g/dL    03-10    142  |  106  |  62[H]  ----------------------------<  122[H]  4.5   |  23  |  2.95[H]    Ca    9.3      10 Mar 2025 00:18  Phos  4.9     03-10  Mg     2.4     03-10    TPro  6.2  /  Alb  2.3[L]  /  TBili  0.5  /  DBili  x   /  AST  32  /  ALT  27  /  AlkPhos  129[H]  03-10    PT/INR - ( 10 Mar 2025 00:18 )   PT: 12.7 sec;   INR: 1.11 ratio         PTT - ( 10 Mar 2025 00:18 )  PTT:54.3 sec  Bilirubin Total: 0.5 mg/dL (03-10-25 @ 00:18)

## 2025-03-10 NOTE — PROGRESS NOTE ADULT - ASSESSMENT
69 year old male with a past medical history of hypertension, hyperlipemia VAZQUEZ (on CPAP at bedtime, NC 2 liters during the day), CKD stage 3, epilepsy, schizophrenia, developmental delay, metastatic testicular cancer (s/p orchiectomy, actively on chemotherapy, last dose of etoposide/cisplatin on 6/2024), presenting with acute on chronic hypoxemic respiratory failure, secondary to (a) COVID-19 infection, (b) superimposed pneumonia after recent influenza infection, and/or (c) fluid overload. MICU consulted and accepted after RRT due to increased work of breathing.    NEURO:  #Mental status:  - hx of developmental delay  - Worsening mental status 3/8, likely iso respiratory distress  - Re-intubated 3/8, sedated with fent and prop    #Epilepsy:  - Continue with home meds which include Lacosamide, Lurasidone, Lamotrigine - increased to 50 BID  - EEG negative    #Brain Mets:  - MRI brain now with 5.4 mm enhancing lesion in the LEFT middle cerebellar peduncle with associated edema suspicious for metastases    # Schizophrenia  - Lurasidone halved to reduce oversedation    CV:  #Echo:  Recent echo last admission showing Left ventricular cavity is normal in size. Left ventricular systolic function is normal with an ejection fraction of 62 %. There are no regional wall motion abnormalities seen. Normal right ventricular cavity size and normal right ventricular systolic function.   - Repeat echo continuing to show EF 70%    #Afib w/RVR  - New onset Afib RVR (on tele, confirmed with EKG 2/19)   - Plan: Started metoprolol 5mg IVP q6 hours & Hep gtt -> metoprolol restarted  - If rate remains uncontrolled, can start Cardizem drip at 5mg/h  - c/w Heparin drip    #HTN:  - On Hydralazine 25mg TID, Amlodipine 5mg  -amlodipine 5 restarted 3/7 given hypertension    PULM:  #Vent   - Extubated 3/4  - Re-intubated 3/8 iso resp distress and copious secretions, bronchoscopy following intubation    #AHRF  - Patient admitted to the hospital for AHRF, found to be COVID (+)  - Likely 2/2 PNA, does not appear to CHF decompensation  -worsening secretions and consolidations on 3/8, iso fever/leukocytosis may be new PNA. Management as below    RENAL:  #SHAGUFTA: - Resolving  - Hx of CKD stage 3, Cr 2.38 at baseline, peak at 4.24  - In setting of COVID (+)  - Pre-renal, FeUrea 29%  - Monitor Cr  - Avoid nephrotoxic meds  - [place] in place  -appears to be near baseline at 3/7    GI:  No acute issues     #Diet: Tube feeds via OG tube  #Bowel Regimen  - On Senna and Miralax    ENDO:  No acute issues  Thyroid nodule noted in prior notes    HEMATOLOGIC:  #Thrombocytopenic  - Unclear origin  - Concern for possible mets to bone marrow? vs due to infection  - Continue to monitor  - Transfuse to maintain Plt>10K unless actively bleeding then maintain >50K  - Heme-Onc recs appreciated    #Anemia  - CKD vs. cancer vs. sepsis  - 1 unit PRBCs given  - CTM CBC  - No sign of bleeding    #DVT prophylaxis   - heparin gtt    ID:  #Sepsis  #PNA  -new fever and leukocytosis 3/7-3/8 with worsening pulmonary findings, c/f PNA  -f/u Blood/sputum Cx  -Zosyn started 3/8  - f/u UA and UCx, ?cloudy urine   if worsening consider broadening pseudomonal coverage    #COVID-19  - Concern for possible superimposed PNA   - S/p zosyn 2/16- 2/28  - S/p Remdesivir    SKIN:  #Lines:  2x PIV    Ethics:  - Full Code  - Contact: Sister Ester [phone]  69 year old male with a past medical history of hypertension, hyperlipemia VAZQUEZ (on CPAP at bedtime, NC 2 liters during the day), CKD stage 3, epilepsy, schizophrenia, developmental delay, metastatic testicular cancer (s/p orchiectomy, actively on chemotherapy, last dose of etoposide/cisplatin on 6/2024), presenting with acute on chronic hypoxemic respiratory failure, secondary to (a) COVID-19 infection, (b) superimposed pneumonia after recent influenza infection, and/or (c) fluid overload. MICU consulted and accepted after RRT due to increased work of breathing.    NEURO:  #Mental status:  - hx of developmental delay  - Worsening mental status 3/8, likely iso respiratory distress  - Re-intubated 3/8, sedated with fent and prop    #Epilepsy:  - Continue with home meds which include Lacosamide, Lurasidone, Lamotrigine - increased to 50 BID  - EEG negative    #Brain Mets:  - MRI brain now with 5.4 mm enhancing lesion in the LEFT middle cerebellar peduncle with associated edema suspicious for metastases    # Schizophrenia  - Lurasidone halved to reduce oversedation    CV:  #Echo:  Recent echo last admission showing Left ventricular cavity is normal in size. Left ventricular systolic function is normal with an ejection fraction of 62 %. There are no regional wall motion abnormalities seen. Normal right ventricular cavity size and normal right ventricular systolic function.   - Repeat echo continuing to show EF 70%    #Afib w/RVR  - New onset Afib RVR (on tele, confirmed with EKG 2/19)   - Plan: Started metoprolol 5mg IVP q6 hours & Hep gtt -> metoprolol restarted  - If rate remains uncontrolled, can start Cardizem drip at 5mg/h  - c/w Heparin drip    #HTN:  - On Hydralazine 25mg TID, Amlodipine 5mg  -amlodipine 5 restarted 3/7 given hypertension    PULM:  #Vent   - Extubated 3/4  - Re-intubated 3/8 iso resp distress and copious secretions, bronchoscopy following intubation    #AHRF  - Patient admitted to the hospital for AHRF, found to be COVID (+)  - Likely 2/2 PNA, does not appear to CHF decompensation  - worsening secretions and consolidations on 3/8, iso fever/leukocytosis may be new PNA. Management as below  - plan for bedside trach 3/11    RENAL:  #SHAGUFTA: - Resolving  - Hx of CKD stage 3, Cr 2.38 at baseline, peak at 4.24  - In setting of COVID (+)  - Pre-renal, FeUrea 29%  - appreciate nephro input     GI:  No acute issues     #Diet: Tube feeds via OG tube  #Bowel Regimen  - On Senna and Miralax    ENDO:  No acute issues  Thyroid nodule noted in prior notes    HEMATOLOGIC:  #Thrombocytopenic  - Unclear origin  - Concern for possible mets to bone marrow? vs due to infection  - Continue to monitor  - Transfuse to maintain Plt>10K unless actively bleeding then maintain >50K  - Heme-Onc recs appreciated    #Anemia  - CKD vs. cancer vs. sepsis  - 1 unit PRBCs given  - CTM CBC  - No sign of bleeding    #DVT prophylaxis   - heparin gtt    ID:  #Sepsis  #PNA  Pseudomonas in sputum, zosyn resistant  - levaquin renally dosed (3/9-      #COVID-19  - Concern for possible superimposed PNA   - S/p zosyn 2/16- 2/28  - S/p Remdesivir    SKIN:  #Lines:  2x PIV    Ethics:  - Full Code  - Contact: Sister Ester [phone]

## 2025-03-10 NOTE — PROGRESS NOTE ADULT - SUBJECTIVE AND OBJECTIVE BOX
Date of Service: 03-10-25 @ 14:43    Patient is a 69y old  Male who presents with a chief complaint of Acute on chronic hypoxemic respiratory failure (10 Mar 2025 08:30)      Any change in ROS: seems ok:  INTUBATED AND SEDATED     MEDICATIONS  (STANDING):  albuterol/ipratropium for Nebulization 3 milliLiter(s) Nebulizer every 6 hours  atorvastatin 20 milliGRAM(s) Oral at bedtime  chlorhexidine 0.12% Liquid 15 milliLiter(s) Oral Mucosa every 12 hours  cholecalciferol 1000 Unit(s) Oral daily  dexMEDEtomidine Infusion 0.2 MICROgram(s)/kG/Hr (4.46 mL/Hr) IV Continuous <Continuous>  erythromycin   Ointment 1 Application(s) Both EYES four times a day  escitalopram 15 milliGRAM(s) Oral with breakfast  gabapentin Solution 150 milliGRAM(s) Oral every 12 hours  influenza  Vaccine (HIGH DOSE) 0.5 milliLiter(s) IntraMuscular once  lacosamide IVPB 200 milliGRAM(s) IV Intermittent every 12 hours  lamoTRIgine 50 milliGRAM(s) Oral two times a day  lanolin Ointment 1 Application(s) Topical daily  levETIRAcetam   Injectable 750 milliGRAM(s) IV Push every 12 hours  levoFLOXacin IVPB      lurasidone 20 milliGRAM(s) Oral at bedtime  nystatin Powder 1 Application(s) Topical two times a day  pantoprazole  Injectable 40 milliGRAM(s) IV Push daily  polyethylene glycol 3350 17 Gram(s) Oral daily  senna Syrup 10 milliLiter(s) Oral at bedtime  sevelamer carbonate Powder 1600 milliGRAM(s) Oral every 8 hours    MEDICATIONS  (PRN):  artificial tears (preservative free) Ophthalmic Solution 1 Drop(s) Both EYES every 6 hours PRN Dry Eyes  tranexamic acid Injectable for Topical Use 5 milliLiter(s) Topical once PRN Excessive Lip Bleeding    Vital Signs Last 24 Hrs  T(C): 36.5 (10 Mar 2025 12:00), Max: 37.5 (09 Mar 2025 20:00)  T(F): 97.7 (10 Mar 2025 12:00), Max: 99.5 (09 Mar 2025 20:00)  HR: 68 (10 Mar 2025 14:00) (58 - 102)  BP: 131/69 (10 Mar 2025 14:00) (82/50 - 141/69)  BP(mean): 95 (10 Mar 2025 14:00) (61 - 99)  RR: 19 (10 Mar 2025 14:00) (16 - 47)  SpO2: 98% (10 Mar 2025 14:00) (98% - 100%)    Parameters below as of 10 Mar 2025 11:20  Patient On (Oxygen Delivery Method): ventilator      Mode: CPAP with PS  FiO2: 40  PEEP: 6  PS: 5  MAP: 7.8  PIP: 11    I&O's Summary    09 Mar 2025 07:01  -  10 Mar 2025 07:00  --------------------------------------------------------  IN: 2563.5 mL / OUT: 825 mL / NET: 1738.5 mL          Physical Exam:   GENERAL: NAD, well-groomed, well-developed  HEENT: BREE/   Atraumatic, Normocephalic  ENMT: No tonsillar erythema, exudates, or enlargement; Moist mucous membranes, Good dentition, No lesions  NECK: Supple, No JVD, Normal thyroid  CHEST/LUNG: Clear to auscultaion, ; No rales, rhonchi, wheezing, or rubs  CVS: Regular rate and rhythm; No murmurs, rubs, or gallops  GI: : Soft, Nontender, Nondistended; Bowel sounds present  NERVOUS SYSTEM:  sedated and intubated   EXTREMITIE+edema  LYMPH: No lymphadenopathy noted  SKIN: No rashes or lesions  ENDOCRINOLOGY: No Thyromegaly  PSYCH: sedated     Labs:  ABG - ( 10 Mar 2025 00:05 )  pH, Arterial: 7.38  pH, Blood: x     /  pCO2: 48    /  pO2: 206   / HCO3: 28    / Base Excess: 2.9   /  SaO2: 100.0           60.0, 31, 30, 44                            7.7    7.13  )-----------( 143      ( 10 Mar 2025 08:32 )             24.6                         6.6    7.07  )-----------( 148      ( 10 Mar 2025 00:18 )             22.2                         7.7    11.82 )-----------( 157      ( 09 Mar 2025 00:27 )             25.4                         8.3    13.54 )-----------( 144      ( 08 Mar 2025 01:11 )             27.6                         3.5    20.24 )-----------( 218      ( 08 Mar 2025 00:16 )             11.6                         8.4    14.83 )-----------( 174      ( 06 Mar 2025 23:42 )             27.7     03-10    142  |  106  |  62[H]  ----------------------------<  122[H]  4.5   |  23  |  2.95[H]  03-09    147[H]  |  108  |  63[H]  ----------------------------<  107[H]  4.2   |  23  |  2.92[H]  03-08    149[H]  |  110[H]  |  67[H]  ----------------------------<  114[H]  4.6   |  25  |  2.40[H]  03-06    146[H]  |  109[H]  |  85[H]  ----------------------------<  127[H]  4.6   |  25  |  2.52[H]    Ca    9.3      10 Mar 2025 00:18  Ca    10.1      09 Mar 2025 00:27  Phos  4.9     03-10  Phos  4.9     03-09  Mg     2.4     03-10  Mg     2.6     03-09    TPro  6.2  /  Alb  2.3[L]  /  TBili  0.5  /  DBili  x   /  AST  32  /  ALT  27  /  AlkPhos  129[H]  03-10  TPro  6.8  /  Alb  2.7[L]  /  TBili  0.6  /  DBili  x   /  AST  27  /  ALT  24  /  AlkPhos  131[H]  03-09  TPro  7.2  /  Alb  2.9[L]  /  TBili  0.6  /  DBili  x   /  AST  30  /  ALT  31  /  AlkPhos  147[H]  03-08  TPro  7.0  /  Alb  3.0[L]  /  TBili  0.4  /  DBili  x   /  AST  29  /  ALT  31  /  AlkPhos  149[H]  03-06    CAPILLARY BLOOD GLUCOSE          LIVER FUNCTIONS - ( 10 Mar 2025 00:18 )  Alb: 2.3 g/dL / Pro: 6.2 g/dL / ALK PHOS: 129 U/L / ALT: 27 U/L / AST: 32 U/L / GGT: x           PT/INR - ( 10 Mar 2025 00:18 )   PT: 12.7 sec;   INR: 1.11 ratio         PTT - ( 10 Mar 2025 00:18 )  PTT:54.3 sec  Urinalysis Basic - ( 10 Mar 2025 00:18 )    Color: x / Appearance: x / SG: x / pH: x  Gluc: 122 mg/dL / Ketone: x  / Bili: x / Urobili: x   Blood: x / Protein: x / Nitrite: x   Leuk Esterase: x / RBC: x / WBC x   Sq Epi: x / Non Sq Epi: x / Bacteria: x            RECENT CULTURES:  03-08 @ 15:27 Bronchial Bronchial Lavage       Moderate polymorphonuclear leukocytes per low power field  No Squamous epithelial cells per low power field  Moderate Gram Negative Rods per oil power field  Few Gram positive cocci in pairs per oil power field           Moderate Pseudomonas aeruginosa    03-08 @ 00:00 Blood Blood                No growth at 48 Hours    03-07 @ 23:30 Blood Blood     rad< from: Xray Chest 1 View- PORTABLE-Urgent (Xray Chest 1 View- PORTABLE-Urgent .) (03.08.25 @ 15:50) >    COMPARISON: Chest x-ray 3/7/2025.    FINDINGS:  Thoracic spinal fusion hardware. Endotracheal tube with tip above the   guero. There is an enteric tube, coursing below the diaphragm, with its   tip in the stomach.  Right chest Mediport with tip in the distal SVC or right atrium.  Heart size cannot be assessed in this projection.  Patchy opacities throughout the left lung are unchanged. Right basilar   atelectasis. Otherwise, the right lung is mostly clear.  No pleural effusion or pneumothorax.    IMPRESSION:  Endotracheal tube is in place with tip above the guero.  Patchy opacities throughout the left lung are unchanged.    --- End of Report ---           AMY THOMAS MD; Resident Radiologist  This document has been electronically signed.  SHARIFA PEREZ MD; Attending Radiologist  This document has been electronically signed. Mar  9 2025  8:35AM    < end of copied text >             No growth at 48 Hours    03-07 @ 18:42 Sputum Sputum   MARIELENA    Rare polymorphonuclear leukocytes per low power field  Few Squamous epithelial cells per low power field  Moderate Gram Negative Rods seen per oil power field    Pseudomonas aeruginosa  Pseudomonas aeruginosa     Numerous Pseudomonas aeruginosa  Commensal lucia consistent with body site    03-05 @ 16:15 Eye Conjunctiva-Right   MARIELENA    No polymorphonuclear cells seen  No organisms seen  by cytocentrifuge    Staphylococcus epidermidis  Staphylococcus epidermidis     Rare Staphylococcus epidermidis          RESPIRATORY CULTURES:          Studies  Chest X-RAY  CT SCAN Chest   Venous Dopplers: LE:   CT Abdomen  Others

## 2025-03-10 NOTE — PROGRESS NOTE ADULT - NSPROGADDITIONALINFOA_GEN_ALL_CORE
speech & swallow eval appreciated, s/p FEES: recommends thicken pureed diet.  events reviewed, RRT for hypoxia, intubated/sedated for increased work of breathing.   transferred to ICU. ICU eval appreciated  GOC discussed, full code per the sister.   Bumex gtt for overload, now off. weaned off pressors.   completed abx, completed antiviral.   ICU care appreciated.   vEEG per ICU team: no seizures.  s/p 1 unit prbc overnight, monitor hgb.   extubated 3/4/25.  on NC.  physical therapy. monitor cough. PRN antitussives.   daily xrays.  abx if suspicion of recurrent PNA, if febrile.   frequent suctioning for secretions.   100.9F, IV Zosyn restarted. cultures sent. ID f/u.   re-intubated for increased work of breathing  plan for trach per ICU team given multiple failed extubations    - Dr. SHIRA Leroy ([facility])  - [phone]

## 2025-03-10 NOTE — PROGRESS NOTE ADULT - ASSESSMENT
70 y/o M with PMH of HTN, HLD, VAZQUEZ on CPAP and home O2 (2LNC daytime), CKD, epilepsy, schizophrenia developmental delay, metastatic testicular CA. Recent admission at [facility] for acute respiratory failure in the setting of seizures, influenza infection, pulmonary edema, PNA requiring intubation in MICU. Was eventually discharged to rehab. Presents now with SOB, found to be COVID + at facility on 2/13. Febrile on admission to ED to 101.9F. Placed on Bipap for increased WOB. Pulmonary called to consult for acute respiratory failure.

## 2025-03-10 NOTE — PROGRESS NOTE ADULT - PROBLEM SELECTOR PLAN 7
Continue with home atorvastatin 20 mg bedtime for HLD, vitamin D supplementation, pantoprazole 40 mg daily, senna 2 tab bedtime, and Miralax 17 gram daily.     General  - DVT prophylaxis: heparin 5000 units q8h --> hep gtt per ICU  - Diet: regular   - Medication reconciliation: complete   - Code: Full   - Medications: Tylenol 650 mg q6h as needed for pain, Maalox 30 mL q4h as needed for acid reflux, melatonin 3 mg bedtime as needed for insomnia, Zofran 4 mg IV q8h as needed for nausea/vomiting      2/16/25 --> plan was discussed with patient's brother Fernando (over the phone, [phone]) in detail. He agrees with plan. All questions answered. He asked that I update Ester (sister, [phone]); she was called and updated as well, also agrees with the plan, also answered all questions.

## 2025-03-10 NOTE — CHART NOTE - NSCHARTNOTEFT_GEN_A_CORE
: Ellie    INDICATION: respiratory failure    PROCEDURE:  [x] LIMITED ECHO  [x] LIMITED CHEST  [ ] LIMITED RETROPERITONEAL  [ ] LIMITED ABDOMINAL  [ ] LIMITED DVT  [ ] NEEDLE GUIDANCE VASCULAR  [ ] NEEDLE GUIDANCE THORACENTESIS  [ ] NEEDLE GUIDANCE PARACENTESIS  [ ] NEEDLE GUIDANCE PERICARDIOCENTESIS  [ ] OTHER    FINDINGS/INTERPRETATION: b-lines with irregular pleura are visualized somewhat frequently, but a-lines still predominate. Bibasilar consolidation patterns. Small consolidation patterns. neck ultrasound without any contraindication to percutaneously-placed tracheostomy.    Images in QPath : Ellie    INDICATION: respiratory failure    PROCEDURE:  [x] LIMITED ECHO  [x] LIMITED CHEST  [ ] LIMITED RETROPERITONEAL  [ ] LIMITED ABDOMINAL  [ ] LIMITED DVT  [ ] NEEDLE GUIDANCE VASCULAR  [ ] NEEDLE GUIDANCE THORACENTESIS  [ ] NEEDLE GUIDANCE PARACENTESIS  [ ] NEEDLE GUIDANCE PERICARDIOCENTESIS  [ ] OTHER    FINDINGS/INTERPRETATION: b-lines with irregular pleura are visualized somewhat frequently, but a-lines still predominate. Bibasilar consolidation patterns. Small consolidation patterns. neck ultrasound without any contraindication to percutaneously-placed tracheostomy. No cardiac limitation with normal GDE    Images in QPath    With Martínez BOWDEN at bedside

## 2025-03-11 LAB
-  AMIKACIN: SIGNIFICANT CHANGE UP
-  AZTREONAM: SIGNIFICANT CHANGE UP
-  CEFEPIME: SIGNIFICANT CHANGE UP
-  CEFTAZIDIME: SIGNIFICANT CHANGE UP
-  CIPROFLOXACIN: SIGNIFICANT CHANGE UP
-  IMIPENEM: SIGNIFICANT CHANGE UP
-  LEVOFLOXACIN: SIGNIFICANT CHANGE UP
-  MEROPENEM: SIGNIFICANT CHANGE UP
-  PIPERACILLIN/TAZOBACTAM: SIGNIFICANT CHANGE UP
ALBUMIN SERPL ELPH-MCNC: 2.5 G/DL — LOW (ref 3.3–5)
ALP SERPL-CCNC: 158 U/L — HIGH (ref 40–120)
ALT FLD-CCNC: 28 U/L — SIGNIFICANT CHANGE UP (ref 10–45)
ANION GAP SERPL CALC-SCNC: 16 MMOL/L — SIGNIFICANT CHANGE UP (ref 5–17)
APTT BLD: 25.5 SEC — SIGNIFICANT CHANGE UP (ref 24.5–35.6)
AST SERPL-CCNC: 33 U/L — SIGNIFICANT CHANGE UP (ref 10–40)
BASOPHILS # BLD AUTO: 0.02 K/UL — SIGNIFICANT CHANGE UP (ref 0–0.2)
BASOPHILS NFR BLD AUTO: 0.3 % — SIGNIFICANT CHANGE UP (ref 0–2)
BILIRUB SERPL-MCNC: 0.5 MG/DL — SIGNIFICANT CHANGE UP (ref 0.2–1.2)
BUN SERPL-MCNC: 60 MG/DL — HIGH (ref 7–23)
CALCIUM SERPL-MCNC: 9.8 MG/DL — SIGNIFICANT CHANGE UP (ref 8.4–10.5)
CHLORIDE SERPL-SCNC: 105 MMOL/L — SIGNIFICANT CHANGE UP (ref 96–108)
CO2 SERPL-SCNC: 20 MMOL/L — LOW (ref 22–31)
CREAT SERPL-MCNC: 3 MG/DL — HIGH (ref 0.5–1.3)
CULTURE RESULTS: ABNORMAL
EGFR: 22 ML/MIN/1.73M2 — LOW
EGFR: 22 ML/MIN/1.73M2 — LOW
EOSINOPHIL # BLD AUTO: 0.39 K/UL — SIGNIFICANT CHANGE UP (ref 0–0.5)
EOSINOPHIL NFR BLD AUTO: 5.6 % — SIGNIFICANT CHANGE UP (ref 0–6)
GAS PNL BLDV: SIGNIFICANT CHANGE UP
GLUCOSE BLDC GLUCOMTR-MCNC: 114 MG/DL — HIGH (ref 70–99)
GLUCOSE SERPL-MCNC: 133 MG/DL — HIGH (ref 70–99)
HCT VFR BLD CALC: 26 % — LOW (ref 39–50)
HGB BLD-MCNC: 8.1 G/DL — LOW (ref 13–17)
IMM GRANULOCYTES NFR BLD AUTO: 3 % — HIGH (ref 0–0.9)
INR BLD: 1.03 RATIO — SIGNIFICANT CHANGE UP (ref 0.85–1.16)
LYMPHOCYTES # BLD AUTO: 0.67 K/UL — LOW (ref 1–3.3)
LYMPHOCYTES # BLD AUTO: 9.6 % — LOW (ref 13–44)
MAGNESIUM SERPL-MCNC: 2.5 MG/DL — SIGNIFICANT CHANGE UP (ref 1.6–2.6)
MCHC RBC-ENTMCNC: 30.9 PG — SIGNIFICANT CHANGE UP (ref 27–34)
MCHC RBC-ENTMCNC: 31.2 G/DL — LOW (ref 32–36)
MCV RBC AUTO: 99.2 FL — SIGNIFICANT CHANGE UP (ref 80–100)
METHOD TYPE: SIGNIFICANT CHANGE UP
MONOCYTES # BLD AUTO: 0.46 K/UL — SIGNIFICANT CHANGE UP (ref 0–0.9)
MONOCYTES NFR BLD AUTO: 6.6 % — SIGNIFICANT CHANGE UP (ref 2–14)
NEUTROPHILS # BLD AUTO: 5.2 K/UL — SIGNIFICANT CHANGE UP (ref 1.8–7.4)
NEUTROPHILS NFR BLD AUTO: 74.9 % — SIGNIFICANT CHANGE UP (ref 43–77)
NRBC BLD AUTO-RTO: 0 /100 WBCS — SIGNIFICANT CHANGE UP (ref 0–0)
ORGANISM # SPEC MICROSCOPIC CNT: ABNORMAL
ORGANISM # SPEC MICROSCOPIC CNT: ABNORMAL
PHOSPHATE SERPL-MCNC: 4.5 MG/DL — SIGNIFICANT CHANGE UP (ref 2.5–4.5)
PLATELET # BLD AUTO: 143 K/UL — LOW (ref 150–400)
POTASSIUM SERPL-MCNC: 4.2 MMOL/L — SIGNIFICANT CHANGE UP (ref 3.5–5.3)
POTASSIUM SERPL-SCNC: 4.2 MMOL/L — SIGNIFICANT CHANGE UP (ref 3.5–5.3)
PROT SERPL-MCNC: 6.5 G/DL — SIGNIFICANT CHANGE UP (ref 6–8.3)
PROTHROM AB SERPL-ACNC: 11.8 SEC — SIGNIFICANT CHANGE UP (ref 9.9–13.4)
RBC # BLD: 2.62 M/UL — LOW (ref 4.2–5.8)
RBC # FLD: 18.5 % — HIGH (ref 10.3–14.5)
SODIUM SERPL-SCNC: 141 MMOL/L — SIGNIFICANT CHANGE UP (ref 135–145)
SPECIMEN SOURCE: SIGNIFICANT CHANGE UP
WBC # BLD: 6.95 K/UL — SIGNIFICANT CHANGE UP (ref 3.8–10.5)
WBC # FLD AUTO: 6.95 K/UL — SIGNIFICANT CHANGE UP (ref 3.8–10.5)

## 2025-03-11 PROCEDURE — 99291 CRITICAL CARE FIRST HOUR: CPT | Mod: 25

## 2025-03-11 PROCEDURE — 76604 US EXAM CHEST: CPT | Mod: 26,GC

## 2025-03-11 PROCEDURE — 93308 TTE F-UP OR LMTD: CPT | Mod: 26,GC

## 2025-03-11 RX ORDER — DESMOPRESSIN ACETATE 4 UG/ML
35 INJECTION INTRAVENOUS ONCE
Refills: 0 | Status: DISCONTINUED | OUTPATIENT
Start: 2025-03-11 | End: 2025-03-11

## 2025-03-11 RX ORDER — DESMOPRESSIN ACETATE 4 UG/ML
0.1 INJECTION INTRAVENOUS ONCE
Refills: 0 | Status: DISCONTINUED | OUTPATIENT
Start: 2025-03-11 | End: 2025-03-11

## 2025-03-11 RX ORDER — DESMOPRESSIN ACETATE 4 UG/ML
36 INJECTION INTRAVENOUS ONCE
Refills: 0 | Status: DISCONTINUED | OUTPATIENT
Start: 2025-03-11 | End: 2025-03-11

## 2025-03-11 RX ORDER — DESMOPRESSIN ACETATE 4 UG/ML
35 INJECTION INTRAVENOUS ONCE
Refills: 0 | Status: COMPLETED | OUTPATIENT
Start: 2025-03-12 | End: 2025-03-12

## 2025-03-11 RX ORDER — EPOETIN ALFA 10000 [IU]/ML
10000 SOLUTION INTRAVENOUS; SUBCUTANEOUS
Refills: 0 | Status: DISCONTINUED | OUTPATIENT
Start: 2025-03-11 | End: 2025-03-28

## 2025-03-11 RX ADMIN — LAMOTRIGINE 50 MILLIGRAM(S): 150 TABLET ORAL at 05:39

## 2025-03-11 RX ADMIN — DEXMEDETOMIDINE HYDROCHLORIDE IN SODIUM CHLORIDE 4.46 MICROGRAM(S)/KG/HR: 4 INJECTION INTRAVENOUS at 22:38

## 2025-03-11 RX ADMIN — GABAPENTIN 150 MILLIGRAM(S): 400 CAPSULE ORAL at 17:02

## 2025-03-11 RX ADMIN — Medication 10 MILLILITER(S): at 22:38

## 2025-03-11 RX ADMIN — ERYTHROMYCIN 1 APPLICATION(S): 5 OINTMENT OPHTHALMIC at 05:40

## 2025-03-11 RX ADMIN — Medication 40 MILLIGRAM(S): at 11:22

## 2025-03-11 RX ADMIN — ERYTHROMYCIN 1 APPLICATION(S): 5 OINTMENT OPHTHALMIC at 11:22

## 2025-03-11 RX ADMIN — IPRATROPIUM BROMIDE AND ALBUTEROL SULFATE 3 MILLILITER(S): .5; 2.5 SOLUTION RESPIRATORY (INHALATION) at 11:07

## 2025-03-11 RX ADMIN — ESCITALOPRAM OXALATE 15 MILLIGRAM(S): 20 TABLET ORAL at 09:49

## 2025-03-11 RX ADMIN — NYSTATIN 1 APPLICATION(S): 100000 CREAM TOPICAL at 05:40

## 2025-03-11 RX ADMIN — Medication 15 MILLILITER(S): at 05:39

## 2025-03-11 RX ADMIN — LACOSAMIDE 140 MILLIGRAM(S): 150 TABLET, FILM COATED ORAL at 17:02

## 2025-03-11 RX ADMIN — EPOETIN ALFA 10000 UNIT(S): 10000 SOLUTION INTRAVENOUS; SUBCUTANEOUS at 09:49

## 2025-03-11 RX ADMIN — IPRATROPIUM BROMIDE AND ALBUTEROL SULFATE 3 MILLILITER(S): .5; 2.5 SOLUTION RESPIRATORY (INHALATION) at 17:17

## 2025-03-11 RX ADMIN — LAMOTRIGINE 50 MILLIGRAM(S): 150 TABLET ORAL at 17:03

## 2025-03-11 RX ADMIN — SEVELAMER HYDROCHLORIDE 1600 MILLIGRAM(S): 800 TABLET ORAL at 05:39

## 2025-03-11 RX ADMIN — LURASIDONE HYDROCHLORIDE 20 MILLIGRAM(S): 120 TABLET, FILM COATED ORAL at 22:39

## 2025-03-11 RX ADMIN — DEXMEDETOMIDINE HYDROCHLORIDE IN SODIUM CHLORIDE 4.46 MICROGRAM(S)/KG/HR: 4 INJECTION INTRAVENOUS at 09:50

## 2025-03-11 RX ADMIN — GABAPENTIN 150 MILLIGRAM(S): 400 CAPSULE ORAL at 05:39

## 2025-03-11 RX ADMIN — IPRATROPIUM BROMIDE AND ALBUTEROL SULFATE 3 MILLILITER(S): .5; 2.5 SOLUTION RESPIRATORY (INHALATION) at 05:40

## 2025-03-11 RX ADMIN — DEXMEDETOMIDINE HYDROCHLORIDE IN SODIUM CHLORIDE 4.46 MICROGRAM(S)/KG/HR: 4 INJECTION INTRAVENOUS at 17:16

## 2025-03-11 RX ADMIN — SEVELAMER HYDROCHLORIDE 1600 MILLIGRAM(S): 800 TABLET ORAL at 22:38

## 2025-03-11 RX ADMIN — LEVETIRACETAM 750 MILLIGRAM(S): 10 INJECTION, SOLUTION INTRAVENOUS at 18:02

## 2025-03-11 RX ADMIN — LEVETIRACETAM 750 MILLIGRAM(S): 10 INJECTION, SOLUTION INTRAVENOUS at 05:39

## 2025-03-11 RX ADMIN — Medication 15 MILLILITER(S): at 17:03

## 2025-03-11 RX ADMIN — LACOSAMIDE 140 MILLIGRAM(S): 150 TABLET, FILM COATED ORAL at 05:40

## 2025-03-11 RX ADMIN — IPRATROPIUM BROMIDE AND ALBUTEROL SULFATE 3 MILLILITER(S): .5; 2.5 SOLUTION RESPIRATORY (INHALATION) at 23:06

## 2025-03-11 RX ADMIN — Medication 1 APPLICATION(S): at 11:23

## 2025-03-11 RX ADMIN — NYSTATIN 1 APPLICATION(S): 100000 CREAM TOPICAL at 17:03

## 2025-03-11 RX ADMIN — ATORVASTATIN CALCIUM 20 MILLIGRAM(S): 80 TABLET, FILM COATED ORAL at 22:38

## 2025-03-11 RX ADMIN — ERYTHROMYCIN 1 APPLICATION(S): 5 OINTMENT OPHTHALMIC at 01:24

## 2025-03-11 NOTE — PROGRESS NOTE ADULT - SUBJECTIVE AND OBJECTIVE BOX
[facility]   [phone]  RENAL FOLLOW UP NOTE  --------------------------------------------------------------------------------  Chief Complaint:    24 hour events/subjective:    seen earlier intubated     PAST HISTORY  --------------------------------------------------------------------------------  No significant changes to PMH, PSH, FHx, SHx, unless otherwise noted    ALLERGIES & MEDICATIONS  --------------------------------------------------------------------------------  Allergies    seasonal allergies (Unknown)  No Known Drug Allergies    Intolerances    latex (Rash)    Standing Inpatient Medications  albuterol/ipratropium for Nebulization 3 milliLiter(s) Nebulizer every 6 hours  atorvastatin 20 milliGRAM(s) Oral at bedtime  chlorhexidine 0.12% Liquid 15 milliLiter(s) Oral Mucosa every 12 hours  cholecalciferol 1000 Unit(s) Oral daily  dexMEDEtomidine Infusion 0.2 MICROgram(s)/kG/Hr IV Continuous <Continuous>  epoetin krystyna-epbx (RETACRIT) Injectable 35165 Unit(s) SubCutaneous <User Schedule>  escitalopram 15 milliGRAM(s) Oral with breakfast  gabapentin Solution 150 milliGRAM(s) Oral every 12 hours  influenza  Vaccine (HIGH DOSE) 0.5 milliLiter(s) IntraMuscular once  lacosamide IVPB 200 milliGRAM(s) IV Intermittent every 12 hours  lamoTRIgine 50 milliGRAM(s) Oral two times a day  lanolin Ointment 1 Application(s) Topical daily  levETIRAcetam   Injectable 750 milliGRAM(s) IV Push every 12 hours  levoFLOXacin IVPB      levoFLOXacin IVPB 750 milliGRAM(s) IV Intermittent every 48 hours  lurasidone 20 milliGRAM(s) Oral at bedtime  nystatin Powder 1 Application(s) Topical two times a day  pantoprazole  Injectable 40 milliGRAM(s) IV Push daily  polyethylene glycol 3350 17 Gram(s) Oral daily  senna Syrup 10 milliLiter(s) Oral at bedtime  sevelamer carbonate Powder 1600 milliGRAM(s) Oral every 8 hours    PRN Inpatient Medications  artificial tears (preservative free) Ophthalmic Solution 1 Drop(s) Both EYES every 6 hours PRN      REVIEW OF SYSTEMS  --------------------------------------------------------------------------------        VITALS/PHYSICAL EXAM  --------------------------------------------------------------------------------  T(C): 31.2 (03-11-25 @ 20:00), Max: 37.3 (03-11-25 @ 08:00)  HR: 66 (03-11-25 @ 20:39) (55 - 102)  BP: 149/68 (03-11-25 @ 20:00) (103/51 - 160/78)  RR: 16 (03-11-25 @ 20:00) (16 - 30)  SpO2: 96% (03-11-25 @ 20:39) (96% - 100%)  Wt(kg): --        03-10-25 @ 07:01  -  03-11-25 @ 07:00  --------------------------------------------------------  IN: 1569.9 mL / OUT: 620 mL / NET: 949.9 mL    03-11-25 @ 07:01  -  03-11-25 @ 22:02  --------------------------------------------------------  IN: 446.7 mL / OUT: 400 mL / NET: 46.7 mL      Physical Exam:  	    Gen: ill appearing male,  remains intubated  responsive   	Pulm: decrease bs, +coarse bs    	CV: No JVD. RRR, S1S2; no rub  	Abd: +BS, soft, nondistended  	UE: Warm, no cyanosis  no clubbing,  no edema;  	LE: Warm, no cyanosis  no clubbing, no edema      LABS/STUDIES  --------------------------------------------------------------------------------              8.1    6.95  >-----------<  143      [03-11-25 @ 00:37]              26.0     141  |  105  |  60  ----------------------------<  133      [03-11-25 @ 00:36]  4.2   |  20  |  3.00        Ca     9.8     [03-11-25 @ 00:36]      Mg     2.5     [03-11-25 @ 00:36]      Phos  4.5     [03-11-25 @ 00:36]    TPro  6.5  /  Alb  2.5  /  TBili  0.5  /  DBili  x   /  AST  33  /  ALT  28  /  AlkPhos  158  [03-11-25 @ 00:36]    PT/INR: PT 11.8 , INR 1.03       [03-11-25 @ 00:37]  PTT: 25.5       [03-11-25 @ 00:37]      Creatinine Trend:  SCr 3.00 [03-11 @ 00:36]  SCr 2.95 [03-10 @ 00:18]  SCr 2.92 [03-09 @ 00:27]  SCr 2.40 [03-08 @ 00:16]  SCr 2.52 [03-06 @ 23:42]            Ferritin [id number]      [02-23-25 @ 06:15]  TSH 0.87      [01-25-25 @ 11:31]

## 2025-03-11 NOTE — PROGRESS NOTE ADULT - PROBLEM SELECTOR PLAN 3
Has a history of CKD stage 3, Cr 2.38 at baseline. Presents with Cr 3.27. Likely pre-renal.   - Renal US no hydronephrosis   - Fe Urea = 29%  - Monitor Cr daily   - Avoid nephrotoxins, dose medication to GFR  - Appreciate nephrology  - bumex gtt now off.  - diuretics per ICU team.

## 2025-03-11 NOTE — PROGRESS NOTE ADULT - SUBJECTIVE AND OBJECTIVE BOX
INTERVAL HPI/OVERNIGHT EVENTS:  - no overnight events    SUBJECTIVE: Patient seen and examined at bedside. He denies any acute complaints today. Feeling well.       VITAL SIGNS:  ICU Vital Signs Last 24 Hrs  T(C): 37.1 (11 Mar 2025 04:00), Max: 37.2 (11 Mar 2025 00:00)  T(F): 98.7 (11 Mar 2025 04:00), Max: 98.9 (11 Mar 2025 00:00)  HR: 68 (11 Mar 2025 06:00) (55 - 99)  BP: 115/57 (11 Mar 2025 06:00) (94/54 - 172/79)  BP(mean): 80 (11 Mar 2025 06:00) (70 - 116)  ABP: --  ABP(mean): --  RR: 16 (11 Mar 2025 06:00) (16 - 38)  SpO2: 99% (11 Mar 2025 06:00) (96% - 100%)    O2 Parameters below as of 11 Mar 2025 05:49  Patient On (Oxygen Delivery Method): ventilator          Mode: AC/ CMV (Assist Control/ Continuous Mandatory Ventilation), RR (machine): 16, TV (machine): 450, FiO2: 40, PEEP: 6, ITime: 0.9, MAP: 9.9, PIP: 26  Plateau pressure:   P/F ratio:     03-10 @ 07:01  -  03-11 @ 07:00  --------------------------------------------------------  IN: 1569.9 mL / OUT: 620 mL / NET: 949.9 mL      CAPILLARY BLOOD GLUCOSE        ECG:    PHYSICAL EXAM:    General: intubated, awake  HEENT: atraumatic, normocephalic. EOMI  Neck: supple, no JVD  Respiratory: CTAB, no increased work of breathing  Cardiovascular: RRR  Abdomen: soft, NT, ND  Extremities: 2+ peripheral pulses b/l  Neurological: answers yes/no questions appropriately     MEDICATIONS:  MEDICATIONS  (STANDING):  albuterol/ipratropium for Nebulization 3 milliLiter(s) Nebulizer every 6 hours  atorvastatin 20 milliGRAM(s) Oral at bedtime  chlorhexidine 0.12% Liquid 15 milliLiter(s) Oral Mucosa every 12 hours  cholecalciferol 1000 Unit(s) Oral daily  dexMEDEtomidine Infusion 0.2 MICROgram(s)/kG/Hr (4.46 mL/Hr) IV Continuous <Continuous>  erythromycin   Ointment 1 Application(s) Both EYES four times a day  escitalopram 15 milliGRAM(s) Oral with breakfast  gabapentin Solution 150 milliGRAM(s) Oral every 12 hours  influenza  Vaccine (HIGH DOSE) 0.5 milliLiter(s) IntraMuscular once  lacosamide IVPB 200 milliGRAM(s) IV Intermittent every 12 hours  lamoTRIgine 50 milliGRAM(s) Oral two times a day  lanolin Ointment 1 Application(s) Topical daily  levETIRAcetam   Injectable 750 milliGRAM(s) IV Push every 12 hours  levoFLOXacin IVPB      levoFLOXacin IVPB 750 milliGRAM(s) IV Intermittent every 48 hours  lurasidone 20 milliGRAM(s) Oral at bedtime  nystatin Powder 1 Application(s) Topical two times a day  pantoprazole  Injectable 40 milliGRAM(s) IV Push daily  polyethylene glycol 3350 17 Gram(s) Oral daily  senna Syrup 10 milliLiter(s) Oral at bedtime  sevelamer carbonate Powder 1600 milliGRAM(s) Oral every 8 hours    MEDICATIONS  (PRN):  artificial tears (preservative free) Ophthalmic Solution 1 Drop(s) Both EYES every 6 hours PRN Dry Eyes      ALLERGIES:  Allergies    seasonal allergies (Unknown)  No Known Drug Allergies    Intolerances    latex (Rash)      LABS:                        8.1    6.95  )-----------( 143      ( 11 Mar 2025 00:37 )             26.0     03-11    141  |  105  |  60[H]  ----------------------------<  133[H]  4.2   |  20[L]  |  3.00[H]    Ca    9.8      11 Mar 2025 00:36  Phos  4.5     03-11  Mg     2.5     03-11    TPro  6.5  /  Alb  2.5[L]  /  TBili  0.5  /  DBili  x   /  AST  33  /  ALT  28  /  AlkPhos  158[H]  03-11    PT/INR - ( 11 Mar 2025 00:37 )   PT: 11.8 sec;   INR: 1.03 ratio         PTT - ( 11 Mar 2025 00:37 )  PTT:25.5 sec  Urinalysis Basic - ( 11 Mar 2025 00:36 )    Color: x / Appearance: x / SG: x / pH: x  Gluc: 133 mg/dL / Ketone: x  / Bili: x / Urobili: x   Blood: x / Protein: x / Nitrite: x   Leuk Esterase: x / RBC: x / WBC x   Sq Epi: x / Non Sq Epi: x / Bacteria: x        RADIOLOGY & ADDITIONAL TESTS: Reviewed.

## 2025-03-11 NOTE — CHART NOTE - NSCHARTNOTEFT_GEN_A_CORE
Indication:  resp failure    Performed by: José Manuel Elizondo PGY1    PROCEDURE:  [X] LIMITED ECHO  [X] LIMITED CHEST  [ ] LIMITED RETROPERITONEAL  [ ] LIMITED ABDOMINAL  [ ] LIMITED DVT  [ ] NEEDLE GUIDANCE VASCULAR  [ ] NEEDLE GUIDANCE THORACENTESIS  [ ] NEEDLE GUIDANCE PARACENTESIS  [ ] NEEDLE GUIDANCE PERICARDIOCENTESIS  [ ] OTHER    FINDINGS:  Chest: A-lines visualized with occasional b-lines and consolidation in bilateral lower fields. No effusions visualized.    ECHO: Limited parasternal long view. Grossly normal LV function with no wall motion abnormalities, septal bowing/flattening   No pericardial effusion    INTERPRETATION: Echo limited views without gross abnormality. Interval pulmonary exam largely unchanged in interval demonstrating a-line predominance with occasional b lines and bibasilar consolidation. Indication:  resp failure    Performed by: José Manuel Elizondo PGY1    PROCEDURE:  [X] LIMITED ECHO  [X] LIMITED CHEST  [ ] LIMITED RETROPERITONEAL  [ ] LIMITED ABDOMINAL  [ ] LIMITED DVT  [ ] NEEDLE GUIDANCE VASCULAR  [ ] NEEDLE GUIDANCE THORACENTESIS  [ ] NEEDLE GUIDANCE PARACENTESIS  [ ] NEEDLE GUIDANCE PERICARDIOCENTESIS  [ ] OTHER    FINDINGS:  Chest: A-lines visualized with occasional b-lines and consolidation in bilateral lower fields. No effusions visualized.    ECHO: Limited parasternal long view. Grossly normal LV function with no wall motion abnormalities, septal bowing/flattening   No pericardial effusion    INTERPRETATION: Echo limited views without gross abnormality. Interval pulmonary exam largely unchanged in interval demonstrating a-line predominance with occasional b lines and bibasilar consolidation.    With Martínez BOWDEN at bedside

## 2025-03-11 NOTE — PROGRESS NOTE ADULT - SUBJECTIVE AND OBJECTIVE BOX
[facility]  JACINTO Horton Y. Patel, S. Shah, G. Casimir  [phone]  ([phone] - weekdays after 5pm and weekends)    Name: OSCAR MILAN  Age/Gender: 69y Male  MRN: [id number]    Interval History:  Patient seen and examined this morning.   Remains intubated, sedated.   Notes reviewed. Afebrile.   Allergies: seasonal allergies (Unknown)  No Known Drug Allergies      Objective:  Vitals:   T(F): 98.7 (03-11-25 @ 04:00), Max: 98.9 (03-11-25 @ 00:00)  HR: 68 (03-11-25 @ 06:00) (55 - 99)  BP: 115/57 (03-11-25 @ 06:00) (94/54 - 172/79)  RR: 16 (03-11-25 @ 06:00) (16 - 38)  SpO2: 99% (03-11-25 @ 06:00) (96% - 100%)  Physical Examination:  General: no acute distress  HEENT: NC/AT, upper lip scab, ETT  Respiratory: decreased breath sounds bilaterally   Cardiovascular: S1 S2 present, normal rate   Gastrointestinal: soft, nondistended, nontender   Extremities: no LE edema, no cyanosis  Skin: no visible rash    Laboratory Studies:  CBC:                       8.1    6.95  )-----------( 143      ( 11 Mar 2025 00:37 )             26.0     WBC Trend:  6.95 03-11-25 @ 00:37  7.13 03-10-25 @ 08:32  7.07 03-10-25 @ 00:18  11.82 03-09-25 @ 00:27  13.54 03-08-25 @ 01:11  20.24 03-08-25 @ 00:16  14.83 03-06-25 @ 23:42  9.52 03-06-25 @ 00:18  8.84 03-05-25 @ 00:15    CMP: 03-11    141  |  105  |  60[H]  ----------------------------<  133[H]  4.2   |  20[L]  |  3.00[H]    Ca    9.8      11 Mar 2025 00:36  Phos  4.5     03-11  Mg     2.5     03-11    TPro  6.5  /  Alb  2.5[L]  /  TBili  0.5  /  DBili  x   /  AST  33  /  ALT  28  /  AlkPhos  158[H]  03-11    Creatinine: 3.00 mg/dL (03-11-25 @ 00:36)  Creatinine: 2.95 mg/dL (03-10-25 @ 00:18)  Creatinine: 2.92 mg/dL (03-09-25 @ 00:27)  Creatinine: 2.40 mg/dL (03-08-25 @ 00:16)  Creatinine: 2.52 mg/dL (03-06-25 @ 23:42)  Creatinine: 2.92 mg/dL (03-06-25 @ 00:11)  Creatinine: 3.31 mg/dL (03-05-25 @ 00:15)    LIVER FUNCTIONS - ( 11 Mar 2025 00:36 )  Alb: 2.5 g/dL / Pro: 6.5 g/dL / ALK PHOS: 158 U/L / ALT: 28 U/L / AST: 33 U/L / GGT: x           Microbiology: reviewed   Culture - Urine (collected 03-09-25 @ 18:14)  Source: Catheterized Catheterized  Preliminary Report (03-10-25 @ 20:33):    >100,000 CFU/ml Pseudomonas aeruginosa    Culture - Bronchial (collected 03-08-25 @ 15:27)  Source: Bronchial Bronchial Lavage  Gram Stain (03-09-25 @ 00:20):    Moderate polymorphonuclear leukocytes per low power field    No Squamous epithelial cells per low power field    Moderate Gram Negative Rods per oil power field    Few Gram positive cocci in pairs per oil power field  Final Report (03-10-25 @ 15:13):    Moderate Pseudomonas aeruginosa    Commensal lucia consistent with body site  Organism: Pseudomonas aeruginosa (03-10-25 @ 15:13)  Organism: Pseudomonas aeruginosa (03-10-25 @ 15:13)      Method Type: MARIELENA      -  Aztreonam: S 8      -  Cefepime: S 8      -  Ceftazidime: S 8      -  Ciprofloxacin: S <=0.25      -  Imipenem: S <=1      -  Levofloxacin: S <=0.5      -  Meropenem: S <=1      -  Piperacillin/Tazobactam: R 64    Culture - Blood (collected 03-08-25 @ 00:00)  Source: Blood Blood  Preliminary Report (03-11-25 @ 04:00):    No growth at 72 Hours    Culture - Blood (collected 03-07-25 @ 23:30)  Source: Blood Blood  Preliminary Report (03-11-25 @ 04:00):    No growth at 72 Hours    Culture - Sputum (collected 03-07-25 @ 18:42)  Source: Sputum Sputum  Gram Stain (03-08-25 @ 07:01):    Rare polymorphonuclear leukocytes per low power field    Few Squamous epithelial cells per low power field    Moderate Gram Negative Rods seen per oil power field  Final Report (03-09-25 @ 16:20):    Numerous Pseudomonas aeruginosa    Commensal lucia consistent with body site  Organism: Pseudomonas aeruginosa (03-09-25 @ 16:20)  Organism: Pseudomonas aeruginosa (03-09-25 @ 16:20)      Method Type: MARIELENA      -  Aztreonam: S 8      -  Cefepime: S 8      -  Ceftazidime: S 8      -  Ciprofloxacin: S <=0.25      -  Imipenem: S <=1      -  Levofloxacin: S <=0.5      -  Meropenem: S <=1      -  Piperacillin/Tazobactam: R 64    Culture - Eye (collected 03-05-25 @ 16:15)  Source: Eye Conjunctiva-Right  Gram Stain (03-08-25 @ 00:03):    No polymorphonuclear cells seen    No organisms seen    by cytocentrifuge  Final Report (03-10-25 @ 19:22):    Rare Staphylococcus epidermidis  Organism: Staphylococcus epidermidis (03-10-25 @ 19:22)      Method Type: MARIELENA      -  Clindamycin: R 4      -  Erythromycin: S <=0.25      -  Gentamicin: S <=4 Should not be used as monotherapy      -  Oxacillin: R 2      -  Penicillin: R 1      -  Rifampin: S <=1 Should not be used as monotherapy      -  Tetracycline: S <=4      -  Trimethoprim/Sulfamethoxazole: S <=0.5/9.5      -  Vancomycin: S 1  Organism: Staphylococcus epidermidis (03-10-25 @ 19:22)    Radiology: reviewed     Medications:  albuterol/ipratropium for Nebulization 3 milliLiter(s) Nebulizer every 6 hours  artificial tears (preservative free) Ophthalmic Solution 1 Drop(s) Both EYES every 6 hours PRN  atorvastatin 20 milliGRAM(s) Oral at bedtime  chlorhexidine 0.12% Liquid 15 milliLiter(s) Oral Mucosa every 12 hours  cholecalciferol 1000 Unit(s) Oral daily  dexMEDEtomidine Infusion 0.2 MICROgram(s)/kG/Hr IV Continuous <Continuous>  epoetin krystyna-epbx (RETACRIT) Injectable 53445 Unit(s) SubCutaneous <User Schedule>  erythromycin   Ointment 1 Application(s) Both EYES four times a day  escitalopram 15 milliGRAM(s) Oral with breakfast  gabapentin Solution 150 milliGRAM(s) Oral every 12 hours  influenza  Vaccine (HIGH DOSE) 0.5 milliLiter(s) IntraMuscular once  lacosamide IVPB 200 milliGRAM(s) IV Intermittent every 12 hours  lamoTRIgine 50 milliGRAM(s) Oral two times a day  lanolin Ointment 1 Application(s) Topical daily  levETIRAcetam   Injectable 750 milliGRAM(s) IV Push every 12 hours  levoFLOXacin IVPB      levoFLOXacin IVPB 750 milliGRAM(s) IV Intermittent every 48 hours  lurasidone 20 milliGRAM(s) Oral at bedtime  nystatin Powder 1 Application(s) Topical two times a day  pantoprazole  Injectable 40 milliGRAM(s) IV Push daily  polyethylene glycol 3350 17 Gram(s) Oral daily  senna Syrup 10 milliLiter(s) Oral at bedtime  sevelamer carbonate Powder 1600 milliGRAM(s) Oral every 8 hours    Current Antimicrobials:  levoFLOXacin IVPB      levoFLOXacin IVPB 750 milliGRAM(s) IV Intermittent every 48 hours    Prior/Completed Antimicrobials:  levoFLOXacin IVPB  piperacillin/tazobactam IVPB.  piperacillin/tazobactam IVPB.-  piperacillin/tazobactam IVPB.-  piperacillin/tazobactam IVPB..  piperacillin/tazobactam IVPB..  piperacillin/tazobactam IVPB...  remdesivir  IVPB  remdesivir  IVPB  remdesivir  IVPB  vancomycin  IVPB.

## 2025-03-11 NOTE — PROGRESS NOTE ADULT - NUTRITIONAL ASSESSMENT
This patient has been assessed with a concern for Malnutrition and has been determined to have a diagnosis/diagnoses of Severe protein-calorie malnutrition.    This patient is being managed with:   Diet NPO after Midnight-     NPO Start Date: 10-Mar-2025   NPO Start Time: 23:59  Entered: Mar 10 2025  4:15PM    Diet NPO with Tube Feed-  Tube Feeding Modality: Nasogastric  Jevity 1.2 Garth (JEVITY1.2RTH)  Total Volume for 24 Hours (mL): 1080  Continuous  Starting Tube Feed Rate {mL per Hour}: 10  Increase Tube Feed Rate by (mL): 10     Every 4 hours  Until Goal Tube Feed Rate (mL per Hour): 60  Tube Feed Duration (in Hours): 18  Tube Feed Start Time: 11:00  Entered: Mar  9 2025 11:02AM

## 2025-03-11 NOTE — PROGRESS NOTE ADULT - ASSESSMENT
Patient is a 69 year old male with PMH of HTN, HLD, VAZQUEZ (on CPAP at bedtime, NC 2 liters during the day), CKD3, epilepsy, schizophrenia, developmental delay, metastatic testicular cancer (s/p orchiectomy, actively on chemotherapy, last dose of etoposide/cisplatin on 6/2024) presenting with worsening shortness of breath. Patient was recently hospitalized at [facility] from January 27th 2025 to February 6th 2025 for seizure and influenza virus infection, which required intubation. He was sent to rehab (originally from home) from hospital and now returns due to sudden onset shortness of breath and worsening oxygenation. Of note, he tested positive for COVID-19 at facility on 2/13/25 and was not put on any COVID-19 treatment at the time, only guaifenesin and scopolamine patch. Patient was placed on non-rebreather and sent to the hospital on 2/16/25. Noted initial discussion with patient/family and they decided to hold off on remdesivir given LFTs and SHAGUFTA.     Acute on chronic hypoxic respiratory failure  COVID-19 pneumonia, superimposed bacterial pneumonia  Acute on chronic HFpEF  Klebsiella UTI, treated   SHAGUFTA on CKD  Severe sepsis, hypotension, SHAGUFTA, likely due to aspiration pneumonia, treated  Now with fever with copious secretions post extubation - Pseudomonas pneumonia   Pseudomonas UTI  - CT chest with extensive b/l ggo c/w COVID pneumonia; tracheomalacia   - MRSA PCR screen negative, s/p vancomycin   - 2/18 worsening resp status, requiring AVAPS, started on remdesivir   - 2/22 completed remdesivir x5d course   - 2/23 s/p RRT for inc WOB, s/p intubation, suspected aspiration   - 2/24 intubated, on sedation, pressor support, SHAGUFTA, WBC wnl   - 2/25 afebrile, off pressor, WBC wnl, Cr stable  - 2/26 CT chest with improved upper lobe ggo, new/worsening confluent areas of consolidation in mid-lower lungs   - 2/26 completed dexamethasone x10d  - 2/28 completed zosyn   - 3/4 s/p extubation, now on NC, copious secretions requiring frequent suctioning   - 3/5 started on erythromycin for conjunctivitis, culture grew Staph epi - S to erythromycin   - 3/7 febrile, WBC noted, secretions less but still a lot, worsening pulm findings, likely aspirating, started on zosyn    - s/p re-intubation and bronchoscopy with L mainstem mucous plug, thick secretions -send for culture    - 3/7 Scx noted with Pseudomonas aeruginosa -- now R to zosyn (h/o pansensitive Pseudomonas in Scx in 1/2025)   - 3/8 BAL culture with mod Pseudomonas   -  3/9 pm switched from zosyn to levofloxacin based on sensitivities   - 3/9 noted with cloudy urine, UA with pyuria -- Ucx growing Pseudomonas also   - 3/10 WBC normalized, Hb low, temps wnl, remains intubated     Recommendations:  Follow Ucx for Pseudomonas sensitivities   Continue on levofloxacin (renally dosed)-can plan for 7 days for now   Continue on erythromycin x7d end 3/11  Nephrology following, monitor Cr  Monitor temps/WBC  Supportive care  Aspiration precautions  Continue rest of care per primary team       Greyson Mart M.D.  [facility]  Available on [employer] TE[employer] - *PREFERRED*  [phone]  After 5pm on weekdays and all day on weekends - please call [phone]     Thank you for consulting us and involving us in the management of this patients case. In addition to reviewing history, imaging, documents, labs, microbiology, took into account antibiotic stewardship, local antibiogram and infection control strategies and potential transmission issues.

## 2025-03-11 NOTE — PROGRESS NOTE ADULT - SUBJECTIVE AND OBJECTIVE BOX
[facility] HEMATOLOGY/ON[facility]OGY INPATIENT PROGRESS NOTE     Interval Hx:   03-11-25: Mr. Gr was seen at bedside today.    Meds:   MEDICATIONS  (STANDING):  albuterol/ipratropium for Nebulization 3 milliLiter(s) Nebulizer every 6 hours  atorvastatin 20 milliGRAM(s) Oral at bedtime  chlorhexidine 0.12% Liquid 15 milliLiter(s) Oral Mucosa every 12 hours  cholecalciferol 1000 Unit(s) Oral daily  dexMEDEtomidine Infusion 0.2 MICROgram(s)/kG/Hr (4.46 mL/Hr) IV Continuous <Continuous>  erythromycin   Ointment 1 Application(s) Both EYES four times a day  escitalopram 15 milliGRAM(s) Oral with breakfast  gabapentin Solution 150 milliGRAM(s) Oral every 12 hours  influenza  Vaccine (HIGH DOSE) 0.5 milliLiter(s) IntraMuscular once  lacosamide IVPB 200 milliGRAM(s) IV Intermittent every 12 hours  lamoTRIgine 50 milliGRAM(s) Oral two times a day  lanolin Ointment 1 Application(s) Topical daily  levETIRAcetam   Injectable 750 milliGRAM(s) IV Push every 12 hours  levoFLOXacin IVPB      levoFLOXacin IVPB 750 milliGRAM(s) IV Intermittent every 48 hours  lurasidone 20 milliGRAM(s) Oral at bedtime  nystatin Powder 1 Application(s) Topical two times a day  pantoprazole  Injectable 40 milliGRAM(s) IV Push daily  polyethylene glycol 3350 17 Gram(s) Oral daily  senna Syrup 10 milliLiter(s) Oral at bedtime  sevelamer carbonate Powder 1600 milliGRAM(s) Oral every 8 hours    MEDICATIONS  (PRN):  artificial tears (preservative free) Ophthalmic Solution 1 Drop(s) Both EYES every 6 hours PRN Dry Eyes    Vital Signs Last 24 Hrs  T(C): 37.1 (11 Mar 2025 04:00), Max: 37.2 (11 Mar 2025 00:00)  T(F): 98.7 (11 Mar 2025 04:00), Max: 98.9 (11 Mar 2025 00:00)  HR: 60 (11 Mar 2025 04:00) (57 - 99)  BP: 119/58 (11 Mar 2025 04:00) (94/54 - 172/79)  BP(mean): 83 (11 Mar 2025 04:00) (70 - 116)  RR: 18 (11 Mar 2025 04:00) (16 - 38)  SpO2: 96% (11 Mar 2025 04:00) (96% - 100%)    Parameters below as of 11 Mar 2025 00:12  Patient On (Oxygen Delivery Method): ventilator    Physical Exam:  Gen: NAD, inubated, sedated   HEENT: EOMI, MMM  Chest: equal chest rise  Cardiac: regular   Abd: non distended    Labs:                        8.1    6.95  )-----------( 143      ( 11 Mar 2025 00:37 )             26.0     CBC Full  -  ( 11 Mar 2025 00:37 )  WBC Count : 6.95 K/uL  RBC Count : 2.62 M/uL  Hemoglobin : 8.1 g/dL  Hematocrit : 26.0 %  Platelet Count - Automated : 143 K/uL  Mean Cell Volume : 99.2 fl  Mean Cell Hemoglobin : 30.9 pg  Mean Cell Hemoglobin Concentration : 31.2 g/dL    03-11    141  |  105  |  60[H]  ----------------------------<  133[H]  4.2   |  20[L]  |  3.00[H]    Ca    9.8      11 Mar 2025 00:36  Phos  4.5     03-11  Mg     2.5     03-11    TPro  6.5  /  Alb  2.5[L]  /  TBili  0.5  /  DBili  x   /  AST  33  /  ALT  28  /  AlkPhos  158[H]  03-11    PT/INR - ( 11 Mar 2025 00:37 )   PT: 11.8 sec;   INR: 1.03 ratio         PTT - ( 11 Mar 2025 00:37 )  PTT:25.5 sec  Bilirubin Total: 0.5 mg/dL (03-11-25 @ 00:36)   [facility]AT[facility]/ON[facility] INPATIENT PROGRESS NOTE     Interval Hx:   03-11-25: Mr. Gr was seen at bedside today, continues in MICU, intubated and sedated, noted counts, without signs of bleeding, appropriate response to transfusion    Meds:   MEDICATIONS  (STANDING):  albuterol/ipratropium for Nebulization 3 milliLiter(s) Nebulizer every 6 hours  atorvastatin 20 milliGRAM(s) Oral at bedtime  chlorhexidine 0.12% Liquid 15 milliLiter(s) Oral Mucosa every 12 hours  cholecalciferol 1000 Unit(s) Oral daily  dexMEDEtomidine Infusion 0.2 MICROgram(s)/kG/Hr (4.46 mL/Hr) IV Continuous <Continuous>  erythromycin   Ointment 1 Application(s) Both EYES four times a day  escitalopram 15 milliGRAM(s) Oral with breakfast  gabapentin Solution 150 milliGRAM(s) Oral every 12 hours  influenza  Vaccine (HIGH DOSE) 0.5 milliLiter(s) IntraMuscular once  lacosamide IVPB 200 milliGRAM(s) IV Intermittent every 12 hours  lamoTRIgine 50 milliGRAM(s) Oral two times a day  lanolin Ointment 1 Application(s) Topical daily  levETIRAcetam   Injectable 750 milliGRAM(s) IV Push every 12 hours  levoFLOXacin IVPB      levoFLOXacin IVPB 750 milliGRAM(s) IV Intermittent every 48 hours  lurasidone 20 milliGRAM(s) Oral at bedtime  nystatin Powder 1 Application(s) Topical two times a day  pantoprazole  Injectable 40 milliGRAM(s) IV Push daily  polyethylene glycol 3350 17 Gram(s) Oral daily  senna Syrup 10 milliLiter(s) Oral at bedtime  sevelamer carbonate Powder 1600 milliGRAM(s) Oral every 8 hours    MEDICATIONS  (PRN):  artificial tears (preservative free) Ophthalmic Solution 1 Drop(s) Both EYES every 6 hours PRN Dry Eyes    Vital Signs Last 24 Hrs  T(C): 37.1 (11 Mar 2025 04:00), Max: 37.2 (11 Mar 2025 00:00)  T(F): 98.7 (11 Mar 2025 04:00), Max: 98.9 (11 Mar 2025 00:00)  HR: 60 (11 Mar 2025 04:00) (57 - 99)  BP: 119/58 (11 Mar 2025 04:00) (94/54 - 172/79)  BP(mean): 83 (11 Mar 2025 04:00) (70 - 116)  RR: 18 (11 Mar 2025 04:00) (16 - 38)  SpO2: 96% (11 Mar 2025 04:00) (96% - 100%)    Parameters below as of 11 Mar 2025 00:12  Patient On (Oxygen Delivery Method): ventilator    Physical Exam:  Gen: NAD, intubated sedated   HEENT: EOMI, MMM  Chest: equal chest rise  Cardiac: regular   Abd: non distended    Labs:                        8.1    6.95  )-----------( 143      ( 11 Mar 2025 00:37 )             26.0     CBC Full  -  ( 11 Mar 2025 00:37 )  WBC Count : 6.95 K/uL  RBC Count : 2.62 M/uL  Hemoglobin : 8.1 g/dL  Hematocrit : 26.0 %  Platelet Count - Automated : 143 K/uL  Mean Cell Volume : 99.2 fl  Mean Cell Hemoglobin : 30.9 pg  Mean Cell Hemoglobin Concentration : 31.2 g/dL    03-11    141  |  105  |  60[H]  ----------------------------<  133[H]  4.2   |  20[L]  |  3.00[H]    Ca    9.8      11 Mar 2025 00:36  Phos  4.5     03-11  Mg     2.5     03-11    TPro  6.5  /  Alb  2.5[L]  /  TBili  0.5  /  DBili  x   /  AST  33  /  ALT  28  /  AlkPhos  158[H]  03-11    PT/INR - ( 11 Mar 2025 00:37 )   PT: 11.8 sec;   INR: 1.03 ratio         PTT - ( 11 Mar 2025 00:37 )  PTT:25.5 sec  Bilirubin Total: 0.5 mg/dL (03-11-25 @ 00:36)

## 2025-03-11 NOTE — PROGRESS NOTE ADULT - ASSESSMENT

## 2025-03-11 NOTE — PROGRESS NOTE ADULT - PROBLEM SELECTOR PLAN 7
Continue with home atorvastatin 20 mg bedtime for HLD, vitamin D supplementation, pantoprazole 40 mg daily, senna 2 tab bedtime, and Miralax 17 gram daily.     General  - DVT prophylaxis: heparin 5000 units q8h --> hep gtt per ICU  - Code: Full      2/16/25 --> plan was discussed with patient's brother Fernando (over the phone, [phone]) in detail. He agrees with plan. All questions answered. He asked that I update Ester (sister, [phone]); she was called and updated as well, also agrees with the plan, also answered all questions.

## 2025-03-11 NOTE — PROGRESS NOTE ADULT - ASSESSMENT
69 year old male with a past medical history of hypertension, hyperlipemia VAZQUEZ (on CPAP at bedtime, NC 2 liters during the day), CKD stage 3, epilepsy, schizophrenia, developmental delay, metastatic testicular cancer (s/p orchiectomy, actively on chemotherapy, last dose of etoposide/cisplatin on 6/2024) presenting with worsening shortness of breath. Patient was recently hospitalized at [facility] from January 27th 2025 to February 6th 2025 for seizure and influenza virus infection, which required intubation. He was sent to rehab (originally from home) from hospital. He returns due to sudden onset shortness of breath and worsening oxygenation. Of note, he tested positive for COVID-19       1- SHAGUFTA on CKDIII  2- covid-19  3- anemia  4- hypotension  5- respiratory failure         SHAGUFTA suspected in setting of new infection. covid 19   cr and bun  at a plateau   bp is better   trend cr and lytes   hyperphosphatemia   renvela 1600 mg tid  anemia, trend hb add retacrit [id number] U tiw sq

## 2025-03-11 NOTE — PROGRESS NOTE ADULT - ASSESSMENT
69 year old male with a past medical history of hypertension, hyperlipemia VAZQUEZ (on CPAP at bedtime, NC 2 liters during the day), CKD stage 3, epilepsy, schizophrenia, developmental delay, metastatic testicular cancer (s/p orchiectomy, actively on chemotherapy, last dose of etoposide/cisplatin on 6/2024), presenting with acute on chronic hypoxemic respiratory failure, secondary to (a) COVID-19 infection, (b) superimposed pneumonia after recent influenza infection, and/or (c) fluid overload. MICU consulted and accepted after RRT due to increased work of breathing.    NEURO:  #Mental status:  - hx of developmental delay  - Worsening mental status 3/8, likely iso respiratory distress  - Re-intubated 3/8, sedated with fent and prop    #Epilepsy:  - Continue with home meds which include Lacosamide, Lurasidone, Lamotrigine - increased to 50 BID  - EEG negative    #Brain Mets:  - MRI brain now with 5.4 mm enhancing lesion in the LEFT middle cerebellar peduncle with associated edema suspicious for metastases    # Schizophrenia  - Lurasidone halved to reduce oversedation    CV:  #Afib w/RVR  - New onset Afib RVR (on tele, confirmed with EKG 2/19)   - Plan: Started metoprolol 5mg IVP q6 hours & Hep gtt -> metoprolol restarted  - If rate remains uncontrolled, can start Cardizem drip at 5mg/h  - c/w Heparin drip    #AHRF  - Patient admitted to the hospital for AHRF, found to be COVID (+)  - Likely 2/2 PNA, does not appear to CHF decompensation  - worsening secretions and consolidations on 3/8, iso fever/leukocytosis may be new PNA. Management as below  - plan for bedside trach 3/11    RENAL:  #SHAGUFTA on CKD3  - Hx of CKD stage 3, Cr 2.38 at baseline, peak at 4.24  - In setting of COVID (+)  - appreciate nephro input     GI:  No acute issues     #Diet: Tube feeds via OG tube  #Bowel Regimen  - On Senna and Miralax    ENDO:  No acute issues  Thyroid nodule noted in prior notes    HEMATOLOGIC:  #Thrombocytopenic  - Unclear origin  - Concern for possible mets to bone marrow? vs due to infection  - Continue to monitor  - Transfuse to maintain Plt>10K unless actively bleeding then maintain >50K  - Heme-Onc recs appreciated    #Anemia  - CKD vs. cancer vs. sepsis  - 1 unit PRBCs given  - CTM CBC  - No sign of bleeding  - retacrit 10,000 tiw sq    #DVT prophylaxis   - heparin gtt held iso H/H drop     ID:  #Sepsis  #PNA  Pseudomonas in sputum, zosyn resistant. Also Pseudomonas in Ucx  - levaquin renally dosed (3/9-      SKIN:  #Lines:  2x PIV    Ethics:  - Full Code  - Contact: Sister Ester [phone]  69 year old male with a past medical history of hypertension, hyperlipemia VAZQUEZ (on CPAP at bedtime, NC 2 liters during the day), CKD stage 3, epilepsy, schizophrenia, developmental delay, metastatic testicular cancer (s/p orchiectomy, actively on chemotherapy, last dose of etoposide/cisplatin on 6/2024), presenting with acute on chronic hypoxemic respiratory failure, secondary to (a) COVID-19 infection, (b) superimposed pneumonia after recent influenza infection, and/or (c) fluid overload. MICU consulted and accepted after RRT due to increased work of breathing.    NEURO:  #Mental status:  - hx of developmental delay  - Worsening mental status 3/8, likely iso respiratory distress  - Re-intubated 3/8, sedated with fent and prop    #Epilepsy:  - Continue with home meds which include Lacosamide, Lurasidone, Lamotrigine - increased to 50 BID  - EEG negative    #Brain Mets:  - MRI brain now with 5.4 mm enhancing lesion in the LEFT middle cerebellar peduncle with associated edema suspicious for metastases    # Schizophrenia  - Lurasidone halved to reduce oversedation    CV:  #Afib w/RVR  - New onset Afib RVR (on tele, confirmed with EKG 2/19)   - off Heparin drip iso fluctuating H/H  - restart BB if rates remain uncontrolled    #AHRF  - Patient admitted to the hospital for AHRF, found to be COVID (+)  - Likely 2/2 PNA, does not appear to CHF decompensation  - worsening secretions and consolidations on 3/8, iso fever/leukocytosis may be new PNA. Management as below  - plan for bedside trach 3/11    RENAL:  #SHAGUFTA on CKD3  - Hx of CKD stage 3, Cr 2.38 at baseline, peak at 4.24  - In setting of COVID (+)  - appreciate nephro input   - continue Renvela    GI:  No acute issues     #Diet: Tube feeds via OG tube  #Bowel Regimen  - On Senna and Miralax    ENDO:  No acute issues  Thyroid nodule noted in prior notes    HEMATOLOGIC:  #Thrombocytopenic  - Unclear origin  - Concern for possible mets to bone marrow? vs due to infection  - Continue to monitor  - Transfuse to maintain Plt>10K unless actively bleeding then maintain >50K  - Heme-Onc recs appreciated    #Anemia  - CKD vs. cancer vs. sepsis  - 1 unit PRBCs given  - CTM CBC  - No sign of bleeding  - retacrit 10,000 tiw sq    #DVT prophylaxis   - heparin gtt held iso H/H drop     ID:  #Sepsis  #PNA  Pseudomonas in sputum, zosyn resistant. Also Pseudomonas in Ucx  - levaquin renally dosed (3/9-      SKIN:  #Lines:  2x PIV    Ethics:  - Full Code  - Contact: Sister Ester [phone]

## 2025-03-11 NOTE — PROGRESS NOTE ADULT - PROBLEM SELECTOR PLAN 5
- Gabapentin 150 mg q12h (home med)   - Keppra 750 mg BID (home med)   - Lacosamide 100 mg BID (home med)   - Lurasidone 40 mg daily (home med)   - Lamotrigine 100 mg BID (home med)  - Escitalopram 15 mg daily (home med)  - medication via OG tube while intubated  - vEEG per ICU team: no apparent seizures.

## 2025-03-11 NOTE — PROGRESS NOTE ADULT - ATTENDING COMMENTS
Patient examined and case reviewed in detail on bedside rounds   Patient is critically ill and unstable on vent For trach today following failed extubation attempts  I provided 35+ minutes of critical care time today for this patient. This excludes time spent on procedures and teaching

## 2025-03-11 NOTE — PROGRESS NOTE ADULT - SUBJECTIVE AND OBJECTIVE BOX
DATE OF SERVICE: 03-11-25 @ 12:41    Patient is a 69y old  Male who presents with a chief complaint of Acute on chronic hypoxemic respiratory failure (11 Mar 2025 09:26)      INTERVAL HISTORY: Intubated and sedated.     REVIEW OF SYSTEMS: Unable to participate in ROS  CONSTITUTIONAL: No weakness  EYES/ENT: No visual changes;  No throat pain   NECK: No pain or stiffness  RESPIRATORY: No cough, wheezing; No shortness of breath  CARDIOVASCULAR: No chest pain or palpitations  GASTROINTESTINAL: No abdominal  pain. No nausea, vomiting, or hematemesis  GENITOURINARY: No dysuria, frequency or hematuria  NEUROLOGICAL: No stroke like symptoms  SKIN: No rashes    TELEMETRY Personally reviewed: SB/SR with [facility] PACs  	  MEDICATIONS:        PHYSICAL EXAM:  T(C): 37.3 (03-11-25 @ 08:00), Max: 37.3 (03-11-25 @ 08:00)  HR: 70 (03-11-25 @ 12:07) (55 - 99)  BP: 141/70 (03-11-25 @ 10:00) (94/54 - 172/79)  RR: 22 (03-11-25 @ 10:00) (16 - 24)  SpO2: 98% (03-11-25 @ 12:07) (96% - 100%)  Wt(kg): --  I&O's Summary    10 Mar 2025 07:01  -  11 Mar 2025 07:00  --------------------------------------------------------  IN: 1569.9 mL / OUT: 620 mL / NET: 949.9 mL          Appearance: In no distress	  HEENT:    PERRL, EOMI	  Cardiovascular:  S1 S2, No JVD  Respiratory: Lungs clear to auscultation	  Gastrointestinal:  Soft, Non-tender, + BS	  Vascularature:  No edema of LE  Psychiatric: Appropriate affect   Neuro: no acute focal deficits                               8.1    6.95  )-----------( 143      ( 11 Mar 2025 00:37 )             26.0     03-11    141  |  105  |  60[H]  ----------------------------<  133[H]  4.2   |  20[L]  |  3.00[H]    Ca    9.8      11 Mar 2025 00:36  Phos  4.5     03-11  Mg     2.5     03-11    TPro  6.5  /  Alb  2.5[L]  /  TBili  0.5  /  DBili  x   /  AST  33  /  ALT  28  /  AlkPhos  158[H]  03-11        Labs personally reviewed      ASSESSMENT/PLAN: 	    69 year old male with a past medical history of hypertension, hyperlipemia VAZQUEZ (on CPAP at bedtime, NC 2 liters during the day), CKD stage 3, epilepsy, schizophrenia, developmental delay, metastatic testicular cancer (s/p orchiectomy, actively on chemotherapy, last dose of etoposide/cisplatin on 6/2024), presenting with acute on chronic hypoxemic respiratory failure, secondary to (a) COVID-19 infection, (b) superimposed pneumonia after recent influenza infection, and/or (c) fluid overload.     Problem/Plan - 1:  ·  Problem: Acute on chronic respiratory failure with hypoxia.   ·  Plan: Likely 2/2 PNA, HF  - Appreciate pulmonology.  - Transferred to MICU for hypoxia. Appreciate MICU care.   - 2/24 started on Bumex gtt at 2mg/hr  - 2/26 No appreciable JVD or peripheral edema. BP now soft with labile but stable Cr. Does not appear to be a HF. Now off diuretics.   --- 2/26 CT Chest shows new and worsening confluent areas of consolidation/pneumonia in the mid to lower lungs.  - CXR 3/10 with unchanged patchy opacities throughout the left lung    Problem/Plan - 2:  ·  Problem: SHAGUFTA (acute kidney injury).   ·  Plan: Has a history of CKD stage 3, Cr 2.38 at baseline. Presents with Cr 3.27.   - Nephrology following  - BUN now improved following de-escalation of diuretics    Problem/Plan - 3:  ·  Problem: Chronic heart failure with preserved ejection fraction.   ·  Plan: TTE done here 1/17/25 technically difficult, but LV systolic fxn appears nl without any regional wall motion abnormalities  - Repeat TTE pending  - BNP 5000 <---1100 but appears euvolemic   - s/p bumex gtt per ICU    Problem/Plan - 4:  ·  Problem: New onset Afib RVR (on tele, confirmed with EKG 2/19)   ·  Plan: Started metoprolol 5mg IVP q6 hours & Hep gtt  - If rate remains uncontrolled, can start Cardizem drip at 5mg/h  - AC on hold given thrombocytopenia and anemia    Problem/Plan - 5:  ·  Problem: HTN    ·  Plan: Start Hydral 25mg PO TID for elevated BP now that it has recovered          Magaly Laird, REILLY-NP   Gus Andrew DO [facility]  Cardiovascular Medicine  [address] [place] [address], [address]  Available through call or text on [employer]  Office: [phone]

## 2025-03-11 NOTE — PROGRESS NOTE ADULT - SUBJECTIVE AND OBJECTIVE BOX
SUBJECTIVE/ OVERNIGHT EVENTS:  awake alert  comfortable  intubated.   on sedation vacation  remain in MICU  trach today tentatively       --------------------------------------------------------------------------------------------  LABS:                        8.1    6.95  )-----------( 143      ( 11 Mar 2025 00:37 )             26.0     03-11    141  |  105  |  60[H]  ----------------------------<  133[H]  4.2   |  20[L]  |  3.00[H]    Ca    9.8      11 Mar 2025 00:36  Phos  4.5     03-11  Mg     2.5     03-11    TPro  6.5  /  Alb  2.5[L]  /  TBili  0.5  /  DBili  x   /  AST  33  /  ALT  28  /  AlkPhos  158[H]  03-11    PT/INR - ( 11 Mar 2025 00:37 )   PT: 11.8 sec;   INR: 1.03 ratio         PTT - ( 11 Mar 2025 00:37 )  PTT:25.5 sec  CAPILLARY BLOOD GLUCOSE            Urinalysis Basic - ( 11 Mar 2025 00:36 )    Color: x / Appearance: x / SG: x / pH: x  Gluc: 133 mg/dL / Ketone: x  / Bili: x / Urobili: x   Blood: x / Protein: x / Nitrite: x   Leuk Esterase: x / RBC: x / WBC x   Sq Epi: x / Non Sq Epi: x / Bacteria: x        RADIOLOGY & ADDITIONAL TESTS:    Imaging Personally Reviewed:  [x] YES  [ ] NO    Consultant(s) Notes Reviewed:  [x] YES  [ ] NO    MEDICATIONS  (STANDING):  albuterol/ipratropium for Nebulization 3 milliLiter(s) Nebulizer every 6 hours  atorvastatin 20 milliGRAM(s) Oral at bedtime  chlorhexidine 0.12% Liquid 15 milliLiter(s) Oral Mucosa every 12 hours  cholecalciferol 1000 Unit(s) Oral daily  dexMEDEtomidine Infusion 0.2 MICROgram(s)/kG/Hr (4.46 mL/Hr) IV Continuous <Continuous>  epoetin krystyna-epbx (RETACRIT) Injectable 16570 Unit(s) SubCutaneous <User Schedule>  erythromycin   Ointment 1 Application(s) Both EYES four times a day  escitalopram 15 milliGRAM(s) Oral with breakfast  gabapentin Solution 150 milliGRAM(s) Oral every 12 hours  influenza  Vaccine (HIGH DOSE) 0.5 milliLiter(s) IntraMuscular once  lacosamide IVPB 200 milliGRAM(s) IV Intermittent every 12 hours  lamoTRIgine 50 milliGRAM(s) Oral two times a day  lanolin Ointment 1 Application(s) Topical daily  levETIRAcetam   Injectable 750 milliGRAM(s) IV Push every 12 hours  levoFLOXacin IVPB      levoFLOXacin IVPB 750 milliGRAM(s) IV Intermittent every 48 hours  lurasidone 20 milliGRAM(s) Oral at bedtime  nystatin Powder 1 Application(s) Topical two times a day  pantoprazole  Injectable 40 milliGRAM(s) IV Push daily  polyethylene glycol 3350 17 Gram(s) Oral daily  senna Syrup 10 milliLiter(s) Oral at bedtime  sevelamer carbonate Powder 1600 milliGRAM(s) Oral every 8 hours    MEDICATIONS  (PRN):  artificial tears (preservative free) Ophthalmic Solution 1 Drop(s) Both EYES every 6 hours PRN Dry Eyes      Care Discussed with Consultants/Other Providers [x] YES  [ ] NO    Vital Signs Last 24 Hrs  T(C): 37.1 (11 Mar 2025 04:00), Max: 37.2 (11 Mar 2025 00:00)  T(F): 98.7 (11 Mar 2025 04:00), Max: 98.9 (11 Mar 2025 00:00)  HR: 59 (11 Mar 2025 08:56) (55 - 99)  BP: 115/57 (11 Mar 2025 06:00) (94/54 - 172/79)  BP(mean): 80 (11 Mar 2025 06:00) (70 - 116)  RR: 16 (11 Mar 2025 06:00) (16 - 38)  SpO2: 100% (11 Mar 2025 08:56) (96% - 100%)    Parameters below as of 11 Mar 2025 05:49  Patient On (Oxygen Delivery Method): ventilator      I&O's Summary    10 Mar 2025 07:01  -  11 Mar 2025 07:00  --------------------------------------------------------  IN: 1569.9 mL / OUT: 620 mL / NET: 949.9 mL        PHYSICAL EXAM:  GENERAL: intubated, on sedation vacation, awake  HEAD:  Atraumatic, Normocephalic  EYES: EOMI, PERRLA, conjunctiva and sclera clear  NECK: Supple, No JVD  CHEST/LUNG: mild decrease breath sounds bilaterally; No wheeze   HEART: Regular rate and rhythm; No murmurs, rubs, or gallops  ABDOMEN: Soft, Nontender, Nondistended; Bowel sounds present  Neuro:  intubated, awake.   EXTREMITIES:  2+ Peripheral Pulses, No clubbing, cyanosis, + edema b/l extremities  SKIN: No rashes or lesions

## 2025-03-11 NOTE — PROGRESS NOTE ADULT - PROBLEM SELECTOR PLAN 1
? superimposed PNA on top of COVID-19?  - s/p Zosyn 4.5 g q12h, renally dosed, completed  - s/p Decadron and Remdesivir IV   - Blood cultures x 2 (-) so far  - MRSA swab negative  - Sputum culture --> few gram variable rods and rare G (+) cocci in pairs  - Legionella Ur Ag (-)  - Streptococcus pneumoniae neg  - repeat CT chest reviewed: upper lobe groundglass opacities have improved, there are new and worsening confluent areas of consolidation/pneumonia in the mid to lower lungs  - intubated for worsening respiratory faliure, transferred to ICU (see below)

## 2025-03-12 LAB
ALBUMIN SERPL ELPH-MCNC: 2.6 G/DL — LOW (ref 3.3–5)
ALP SERPL-CCNC: 202 U/L — HIGH (ref 40–120)
ALT FLD-CCNC: 34 U/L — SIGNIFICANT CHANGE UP (ref 10–45)
ANION GAP SERPL CALC-SCNC: 14 MMOL/L — SIGNIFICANT CHANGE UP (ref 5–17)
APTT BLD: 24.3 SEC — LOW (ref 24.5–35.6)
AST SERPL-CCNC: 40 U/L — SIGNIFICANT CHANGE UP (ref 10–40)
BASOPHILS # BLD AUTO: 0.01 K/UL — SIGNIFICANT CHANGE UP (ref 0–0.2)
BASOPHILS NFR BLD AUTO: 0.2 % — SIGNIFICANT CHANGE UP (ref 0–2)
BILIRUB SERPL-MCNC: 0.4 MG/DL — SIGNIFICANT CHANGE UP (ref 0.2–1.2)
BUN SERPL-MCNC: 58 MG/DL — HIGH (ref 7–23)
CALCIUM SERPL-MCNC: 9.7 MG/DL — SIGNIFICANT CHANGE UP (ref 8.4–10.5)
CHLORIDE SERPL-SCNC: 107 MMOL/L — SIGNIFICANT CHANGE UP (ref 96–108)
CO2 SERPL-SCNC: 22 MMOL/L — SIGNIFICANT CHANGE UP (ref 22–31)
CREAT SERPL-MCNC: 2.81 MG/DL — HIGH (ref 0.5–1.3)
EGFR: 24 ML/MIN/1.73M2 — LOW
EGFR: 24 ML/MIN/1.73M2 — LOW
EOSINOPHIL # BLD AUTO: 0.26 K/UL — SIGNIFICANT CHANGE UP (ref 0–0.5)
EOSINOPHIL NFR BLD AUTO: 4 % — SIGNIFICANT CHANGE UP (ref 0–6)
GAS PNL BLDA: SIGNIFICANT CHANGE UP
GLUCOSE BLDC GLUCOMTR-MCNC: 111 MG/DL — HIGH (ref 70–99)
GLUCOSE SERPL-MCNC: 121 MG/DL — HIGH (ref 70–99)
HCT VFR BLD CALC: 25.3 % — LOW (ref 39–50)
HGB BLD-MCNC: 8.1 G/DL — LOW (ref 13–17)
IMM GRANULOCYTES NFR BLD AUTO: 4 % — HIGH (ref 0–0.9)
INR BLD: 1.01 RATIO — SIGNIFICANT CHANGE UP (ref 0.85–1.16)
LYMPHOCYTES # BLD AUTO: 0.68 K/UL — LOW (ref 1–3.3)
LYMPHOCYTES # BLD AUTO: 10.4 % — LOW (ref 13–44)
MAGNESIUM SERPL-MCNC: 2.3 MG/DL — SIGNIFICANT CHANGE UP (ref 1.6–2.6)
MCHC RBC-ENTMCNC: 31.4 PG — SIGNIFICANT CHANGE UP (ref 27–34)
MCHC RBC-ENTMCNC: 32 G/DL — SIGNIFICANT CHANGE UP (ref 32–36)
MCV RBC AUTO: 98.1 FL — SIGNIFICANT CHANGE UP (ref 80–100)
MONOCYTES # BLD AUTO: 0.43 K/UL — SIGNIFICANT CHANGE UP (ref 0–0.9)
MONOCYTES NFR BLD AUTO: 6.5 % — SIGNIFICANT CHANGE UP (ref 2–14)
NEUTROPHILS # BLD AUTO: 4.93 K/UL — SIGNIFICANT CHANGE UP (ref 1.8–7.4)
NEUTROPHILS NFR BLD AUTO: 74.9 % — SIGNIFICANT CHANGE UP (ref 43–77)
NRBC BLD AUTO-RTO: 0 /100 WBCS — SIGNIFICANT CHANGE UP (ref 0–0)
PHOSPHATE SERPL-MCNC: 3.9 MG/DL — SIGNIFICANT CHANGE UP (ref 2.5–4.5)
PLATELET # BLD AUTO: 143 K/UL — LOW (ref 150–400)
POTASSIUM SERPL-MCNC: 4.4 MMOL/L — SIGNIFICANT CHANGE UP (ref 3.5–5.3)
POTASSIUM SERPL-SCNC: 4.4 MMOL/L — SIGNIFICANT CHANGE UP (ref 3.5–5.3)
PROT SERPL-MCNC: 6.6 G/DL — SIGNIFICANT CHANGE UP (ref 6–8.3)
PROTHROM AB SERPL-ACNC: 11.6 SEC — SIGNIFICANT CHANGE UP (ref 9.9–13.4)
RBC # BLD: 2.58 M/UL — LOW (ref 4.2–5.8)
RBC # FLD: 17.2 % — HIGH (ref 10.3–14.5)
SODIUM SERPL-SCNC: 143 MMOL/L — SIGNIFICANT CHANGE UP (ref 135–145)
WBC # BLD: 6.57 K/UL — SIGNIFICANT CHANGE UP (ref 3.8–10.5)
WBC # FLD AUTO: 6.57 K/UL — SIGNIFICANT CHANGE UP (ref 3.8–10.5)

## 2025-03-12 PROCEDURE — 99292 CRITICAL CARE ADDL 30 MIN: CPT | Mod: 25

## 2025-03-12 PROCEDURE — 31600 PLANNED TRACHEOSTOMY: CPT | Mod: GC

## 2025-03-12 PROCEDURE — 99291 CRITICAL CARE FIRST HOUR: CPT | Mod: 25

## 2025-03-12 PROCEDURE — 99222 1ST HOSP IP/OBS MODERATE 55: CPT | Mod: GC

## 2025-03-12 PROCEDURE — 31645 BRNCHSC W/THER ASPIR 1ST: CPT

## 2025-03-12 PROCEDURE — 71045 X-RAY EXAM CHEST 1 VIEW: CPT | Mod: 26

## 2025-03-12 RX ORDER — NOREPINEPHRINE BITARTRATE 8 MG
0.05 SOLUTION INTRAVENOUS
Qty: 8 | Refills: 0 | Status: DISCONTINUED | OUTPATIENT
Start: 2025-03-12 | End: 2025-03-12

## 2025-03-12 RX ORDER — PROPOFOL 10 MG/ML
50 INJECTION, EMULSION INTRAVENOUS
Qty: 1000 | Refills: 0 | Status: DISCONTINUED | OUTPATIENT
Start: 2025-03-12 | End: 2025-03-13

## 2025-03-12 RX ORDER — TRANEXAMIC ACID 1000 MG/10
5 AMPUL (ML) INTRAVENOUS ONCE
Refills: 0 | Status: COMPLETED | OUTPATIENT
Start: 2025-03-12 | End: 2025-03-12

## 2025-03-12 RX ORDER — CISATRACURIUM BESYLATE 10 MG/5ML
20 INJECTION, SOLUTION INTRAVENOUS ONCE
Refills: 0 | Status: COMPLETED | OUTPATIENT
Start: 2025-03-12 | End: 2025-03-12

## 2025-03-12 RX ORDER — MIDAZOLAM IN 0.9 % SOD.CHLORID 1 MG/ML
8 PLASTIC BAG, INJECTION (ML) INTRAVENOUS ONCE
Refills: 0 | Status: DISCONTINUED | OUTPATIENT
Start: 2025-03-12 | End: 2025-03-12

## 2025-03-12 RX ORDER — FENTANYL CITRATE-0.9 % NACL/PF 100MCG/2ML
100 SYRINGE (ML) INTRAVENOUS
Refills: 0 | Status: DISCONTINUED | OUTPATIENT
Start: 2025-03-12 | End: 2025-03-12

## 2025-03-12 RX ADMIN — Medication 1 APPLICATION(S): at 11:06

## 2025-03-12 RX ADMIN — Medication 15 MILLILITER(S): at 17:54

## 2025-03-12 RX ADMIN — Medication 100 MICROGRAM(S): at 14:13

## 2025-03-12 RX ADMIN — LACOSAMIDE 140 MILLIGRAM(S): 150 TABLET, FILM COATED ORAL at 05:16

## 2025-03-12 RX ADMIN — IPRATROPIUM BROMIDE AND ALBUTEROL SULFATE 3 MILLILITER(S): .5; 2.5 SOLUTION RESPIRATORY (INHALATION) at 05:18

## 2025-03-12 RX ADMIN — LACOSAMIDE 140 MILLIGRAM(S): 150 TABLET, FILM COATED ORAL at 17:55

## 2025-03-12 RX ADMIN — Medication 100 MICROGRAM(S): at 14:12

## 2025-03-12 RX ADMIN — LURASIDONE HYDROCHLORIDE 20 MILLIGRAM(S): 120 TABLET, FILM COATED ORAL at 21:05

## 2025-03-12 RX ADMIN — CISATRACURIUM BESYLATE 20 MILLIGRAM(S): 10 INJECTION, SOLUTION INTRAVENOUS at 14:08

## 2025-03-12 RX ADMIN — LAMOTRIGINE 50 MILLIGRAM(S): 150 TABLET ORAL at 17:55

## 2025-03-12 RX ADMIN — Medication 15 MILLILITER(S): at 05:16

## 2025-03-12 RX ADMIN — IPRATROPIUM BROMIDE AND ALBUTEROL SULFATE 3 MILLILITER(S): .5; 2.5 SOLUTION RESPIRATORY (INHALATION) at 23:04

## 2025-03-12 RX ADMIN — NYSTATIN 1 APPLICATION(S): 100000 CREAM TOPICAL at 18:10

## 2025-03-12 RX ADMIN — PROPOFOL 26.7 MICROGRAM(S)/KG/MIN: 10 INJECTION, EMULSION INTRAVENOUS at 09:46

## 2025-03-12 RX ADMIN — Medication 10 MILLILITER(S): at 21:05

## 2025-03-12 RX ADMIN — Medication 40 MILLIGRAM(S): at 11:02

## 2025-03-12 RX ADMIN — Medication 100 MICROGRAM(S): at 14:11

## 2025-03-12 RX ADMIN — PROPOFOL 26.7 MICROGRAM(S)/KG/MIN: 10 INJECTION, EMULSION INTRAVENOUS at 18:22

## 2025-03-12 RX ADMIN — ATORVASTATIN CALCIUM 20 MILLIGRAM(S): 80 TABLET, FILM COATED ORAL at 21:05

## 2025-03-12 RX ADMIN — SEVELAMER HYDROCHLORIDE 1600 MILLIGRAM(S): 800 TABLET ORAL at 21:06

## 2025-03-12 RX ADMIN — PROPOFOL 26.7 MICROGRAM(S)/KG/MIN: 10 INJECTION, EMULSION INTRAVENOUS at 19:49

## 2025-03-12 RX ADMIN — DESMOPRESSIN ACETATE 235 MICROGRAM(S): 4 INJECTION INTRAVENOUS at 10:04

## 2025-03-12 RX ADMIN — LEVETIRACETAM 750 MILLIGRAM(S): 10 INJECTION, SOLUTION INTRAVENOUS at 17:54

## 2025-03-12 RX ADMIN — IPRATROPIUM BROMIDE AND ALBUTEROL SULFATE 3 MILLILITER(S): .5; 2.5 SOLUTION RESPIRATORY (INHALATION) at 11:06

## 2025-03-12 RX ADMIN — DEXMEDETOMIDINE HYDROCHLORIDE IN SODIUM CHLORIDE 4.46 MICROGRAM(S)/KG/HR: 4 INJECTION INTRAVENOUS at 09:46

## 2025-03-12 RX ADMIN — Medication 100 MICROGRAM(S): at 14:05

## 2025-03-12 RX ADMIN — NOREPINEPHRINE BITARTRATE 8.35 MICROGRAM(S)/KG/MIN: 8 SOLUTION at 14:16

## 2025-03-12 RX ADMIN — NYSTATIN 1 APPLICATION(S): 100000 CREAM TOPICAL at 05:15

## 2025-03-12 RX ADMIN — LEVETIRACETAM 750 MILLIGRAM(S): 10 INJECTION, SOLUTION INTRAVENOUS at 05:18

## 2025-03-12 RX ADMIN — PROPOFOL 26.7 MICROGRAM(S)/KG/MIN: 10 INJECTION, EMULSION INTRAVENOUS at 14:17

## 2025-03-12 RX ADMIN — GABAPENTIN 150 MILLIGRAM(S): 400 CAPSULE ORAL at 17:55

## 2025-03-12 RX ADMIN — Medication 100 MICROGRAM(S): at 14:08

## 2025-03-12 RX ADMIN — Medication 8 MILLIGRAM(S): at 14:07

## 2025-03-12 RX ADMIN — Medication 5 MILLILITER(S): at 14:11

## 2025-03-12 RX ADMIN — IPRATROPIUM BROMIDE AND ALBUTEROL SULFATE 3 MILLILITER(S): .5; 2.5 SOLUTION RESPIRATORY (INHALATION) at 17:43

## 2025-03-12 RX ADMIN — DEXMEDETOMIDINE HYDROCHLORIDE IN SODIUM CHLORIDE 4.46 MICROGRAM(S)/KG/HR: 4 INJECTION INTRAVENOUS at 19:49

## 2025-03-12 RX ADMIN — Medication 100 MICROGRAM(S): at 14:09

## 2025-03-12 NOTE — PROCEDURE NOTE - NSBRONCHPROCDETAILS_GEN_A_CORE_FT
Bronchoscope placed via ETT to visualize trachea. ETT pulled back and bronchoscope monitoring of needle and then guidewire placed of percutaneous trach visualized.  Once trach tube placed bronchoscope placed into trachea via trach tube to confirm placement.  Scope passed into all airways and no endobronchial lesions visualized. Therapeutic aspiration of secretions of RLL. No bleeding noted.
Bronchoscope inserted through endotracheal tube. Copious secretions aspirated from endotracheal tube, left mainstem bronchus, and right mainstem bronchus. Endotracheal tube repositioned under bronchoscopic guidance. Bronchoscope withdrawn

## 2025-03-12 NOTE — PROCEDURE NOTE - NSTRACHINTUBMED_RESP_A_CORE
cisatracurium/fentaNYL injectable/Lidocaine 2% injectable/midazolam injectable/propofol injectable/propofol infusion
fentaNYL injectable/midazolam injectable/propofol injectable
midazolam injectable/propofol injectable

## 2025-03-12 NOTE — PROGRESS NOTE ADULT - SUBJECTIVE AND OBJECTIVE BOX
Date of Service: 03-12-25 @ 16:54    Patient is a 69y old  Male who presents with a chief complaint of Acute on chronic hypoxemic respiratory failure (12 Mar 2025 14:45)      Any change in ROS: s/p trach on precedex    MEDICATIONS  (STANDING):  albuterol/ipratropium for Nebulization 3 milliLiter(s) Nebulizer every 6 hours  atorvastatin 20 milliGRAM(s) Oral at bedtime  chlorhexidine 0.12% Liquid 15 milliLiter(s) Oral Mucosa every 12 hours  cholecalciferol 1000 Unit(s) Oral daily  dexMEDEtomidine Infusion 0.2 MICROgram(s)/kG/Hr (4.46 mL/Hr) IV Continuous <Continuous>  epoetin krystyna-epbx (RETACRIT) Injectable 96002 Unit(s) SubCutaneous <User Schedule>  escitalopram 15 milliGRAM(s) Oral with breakfast  gabapentin Solution 150 milliGRAM(s) Oral every 12 hours  influenza  Vaccine (HIGH DOSE) 0.5 milliLiter(s) IntraMuscular once  lacosamide IVPB 200 milliGRAM(s) IV Intermittent every 12 hours  lamoTRIgine 50 milliGRAM(s) Oral two times a day  lanolin Ointment 1 Application(s) Topical daily  levETIRAcetam   Injectable 750 milliGRAM(s) IV Push every 12 hours  levoFLOXacin IVPB      levoFLOXacin IVPB 750 milliGRAM(s) IV Intermittent every 48 hours  lurasidone 20 milliGRAM(s) Oral at bedtime  norepinephrine Infusion 0.05 MICROgram(s)/kG/Min (8.35 mL/Hr) IV Continuous <Continuous>  nystatin Powder 1 Application(s) Topical two times a day  pantoprazole  Injectable 40 milliGRAM(s) IV Push daily  polyethylene glycol 3350 17 Gram(s) Oral daily  propofol Infusion 50 MICROgram(s)/kG/Min (26.7 mL/Hr) IV Continuous <Continuous>  senna Syrup 10 milliLiter(s) Oral at bedtime  sevelamer carbonate Powder 1600 milliGRAM(s) Oral every 8 hours    MEDICATIONS  (PRN):  artificial tears (preservative free) Ophthalmic Solution 1 Drop(s) Both EYES every 6 hours PRN Dry Eyes    Vital Signs Last 24 Hrs  T(C): 36.9 (12 Mar 2025 16:00), Max: 36.9 (12 Mar 2025 16:00)  T(F): 98.5 (12 Mar 2025 16:00), Max: 98.5 (12 Mar 2025 16:00)  HR: 84 (12 Mar 2025 16:00) (59 - 93)  BP: 114/56 (12 Mar 2025 16:00) (87/52 - 172/80)  BP(mean): 80 (12 Mar 2025 16:00) (64 - 115)  RR: 20 (12 Mar 2025 16:00) (16 - 41)  SpO2: 100% (12 Mar 2025 16:00) (94% - 100%)    Parameters below as of 12 Mar 2025 11:34  Patient On (Oxygen Delivery Method): ventilator      Mode: AC/ CMV (Assist Control/ Continuous Mandatory Ventilation)  RR (machine): 16  TV (machine): 450  FiO2: 30  PEEP: 6  ITime: 0.9  MAP: 8  PIP: 25    I&O's Summary    11 Mar 2025 07:01  -  12 Mar 2025 07:00  --------------------------------------------------------  IN: 1282.5 mL / OUT: 900 mL / NET: 382.5 mL    12 Mar 2025 07:01  -  12 Mar 2025 16:54  --------------------------------------------------------  IN: 304.6 mL / OUT: 200 mL / NET: 104.6 mL          Physical Exam:   GENERAL: NAD, well-groomed, well-developed  HEENT: BREE/   Atraumatic, Normocephalic  ENMT: No tonsillar erythema, exudates, or enlargement; Moist mucous membranes, Good dentition, No lesions  NECK: Supple, No JVD, Normal thyroid  CHEST/LUNG: Clear to auscultaion- poor air entry   CVS: Regular rate and rhythm; No murmurs, rubs, or gallops  GI: : Soft, Nontender, Nondistended; Bowel sounds present  NERVOUS SYSTEM:  sedated and now with trach   EXTREMITIES: - edema  LYMPH: No lymphadenopathy noted  SKIN: No rashes or lesions  ENDOCRINOLOGY: No Thyromegaly  PSYCH: sedated     Labs:  ABG - ( 12 Mar 2025 00:15 )  pH, Arterial: 7.38  pH, Blood: x     /  pCO2: 44    /  pO2: 150   / HCO3: 26    / Base Excess: 0.5   /  SaO2: 99.6            40, 60.0, 31, 30                            8.1    6.57  )-----------( 143      ( 12 Mar 2025 00:26 )             25.3                         8.1    6.95  )-----------( 143      ( 11 Mar 2025 00:37 )             26.0                         7.7    7.13  )-----------( 143      ( 10 Mar 2025 08:32 )             24.6                         6.6    7.07  )-----------( 148      ( 10 Mar 2025 00:18 )             22.2                         7.7    11.82 )-----------( 157      ( 09 Mar 2025 00:27 )             25.4     03-12    143  |  107  |  58[H]  ----------------------------<  121[H]  4.4   |  22  |  2.81[H]  03-11    141  |  105  |  60[H]  ----------------------------<  133[H]  4.2   |  20[L]  |  3.00[H]  03-10    142  |  106  |  62[H]  ----------------------------<  122[H]  4.5   |  23  |  2.95[H]  03-09    147[H]  |  108  |  63[H]  ----------------------------<  107[H]  4.2   |  23  |  2.92[H]    Ca    9.7      12 Mar 2025 00:26  Ca    9.8      11 Mar 2025 00:36  Phos  3.9     03-12  Phos  4.5     03-11  Mg     2.3     03-12  Mg     2.5     03-11    TPro  6.6  /  Alb  2.6[L]  /  TBili  0.4  /  DBili  x   /  AST  40  /  ALT  34  /  AlkPhos  202[H]  03-12  TPro  6.5  /  Alb  2.5[L]  /  TBili  0.5  /  DBili  x   /  AST  33  /  ALT  28  /  AlkPhos  158[H]  03-11  TPro  6.2  /  Alb  2.3[L]  /  TBili  0.5  /  DBili  x   /  AST  32  /  ALT  27  /  AlkPhos  129[H]  03-10  TPro  6.8  /  Alb  2.7[L]  /  TBili  0.6  /  DBili  x   /  AST  27  /  ALT  24  /  AlkPhos  131[H]  03-09    CAPILLARY BLOOD GLUCOSE          LIVER FUNCTIONS - ( 12 Mar 2025 00:26 )  Alb: 2.6 g/dL / Pro: 6.6 g/dL / ALK PHOS: 202 U/L / ALT: 34 U/L / AST: 40 U/L / GGT: x           PT/INR - ( 12 Mar 2025 00:26 )   PT: 11.6 sec;   INR: 1.01 ratio         PTT - ( 12 Mar 2025 00:26 )  PTT:24.3 sec  Urinalysis Basic - ( 12 Mar 2025 00:26 )    Color: x / Appearance: x / SG: x / pH: x  Gluc: 121 mg/dL / Ketone: x  / Bili: x / Urobili: x   Blood: x / Protein: x / Nitrite: x   Leuk Esterase: x / RBC: x / WBC x   Sq Epi: x / Non Sq Epi: x / Bacteria: x            RECENT CULTURES:  03-09 @ 18:14 Catheterized Catheterized   MARIELENA      Pseudomonas aeruginosa  Pseudomonas aeruginosa     >100,000 CFU/ml Pseudomonas aeruginosa    03-08 @ 15:27 Bronchial Bronchial Lavage   MARIELENA    Moderate polymorphonuclear leukocytes per low power field  No Squamous epithelial cells per low power field  Moderate Gram Negative Rods per oil power field  Few Gram positive cocci in pairs per oil power field    Pseudomonas aeruginosa  Pseudomonas aeruginosa     Moderate Pseudomonas aeruginosa  Commensal lucia consistent with body site    03-08 @ 00:00 Blood Blood                No growth at 4 days    03-07 @ 23:30 Blood Blood     rad< from: Xray Chest 1 View- PORTABLE-Urgent (Xray Chest 1 View- PORTABLE-Urgent .) (03.08.25 @ 15:50) >  TECHNIQUE: Single frontal, portable view of the chest was obtained.    COMPARISON: Chest x-ray 3/7/2025.    FINDINGS:  Thoracic spinal fusion hardware. Endotracheal tube with tip above the   guero. There is an enteric tube, coursing below the diaphragm, with its   tip in the stomach.  Right chest Mediport with tip in the distal SVC or right atrium.  Heart size cannot be assessed in this projection.  Patchy opacities throughout the left lung are unchanged. Right basilar   atelectasis. Otherwise, the right lung is mostly clear.  No pleural effusion or pneumothorax.    IMPRESSION:  Endotracheal tube is in place with tip above the guero.  Patchy opacities throughout the left lung are unchanged.    --- End of Report ---           AMY THOMAS MD; Resident Radiologist  This document has been electronically signed.  SHARIFA PEREZ MD; Attending Radiologist  This document has been electronically signed. Mar  9 2025  8:35AM    < end of copied text >             No growth at 4 days    03-07 @ 18:42 Sputum Sputum   MARIELENA    Rare polymorphonuclear leukocytes per low power field  Few Squamous epithelial cells per low power field  Moderate Gram Negative Rods seen per oil power field    Pseudomonas aeruginosa  Pseudomonas aeruginosa     Numerous Pseudomonas aeruginosa  Commensal lucia consistent with body site          RESPIRATORY CULTURES:          Studies  Chest X-RAY  CT SCAN Chest   Venous Dopplers: LE:   CT Abdomen  Others

## 2025-03-12 NOTE — PROGRESS NOTE ADULT - PROBLEM SELECTOR PLAN 1
-Recent admission for acute respiratory failure in the setting of grand mal seizures, influenza, PNA. S/p intubation in MICU, was extubated to AVAPS. Now COVID +  -CT chest with b/l GGO, new since 1/30/25. Likely COVID PNA +/- superimposed bacterial PNA.   -Increase in O2 requirements - started on HFNC 2/18  -S/p multiple RRTs for hypoxia, increased WOB in the setting of COVID, PNA, fluid overload. Intubated s/p RRT on 2/23 & transferred to MICU  -Extubated 3/4   -Continue bronchodilators  -Completed ABX   -Still with difficulty managing secretions, GOC discussions ongoing   -Keep O>I as tolerated, s/p Bumex drip   -Keep sats >90% with O2 PRN.  3/9: he got reintubated: now for trach next week  3/10: remains intubated ? now needs trach :  defer to MICU team  3/12: now s/p trach :  on levofloxacin : PA is resistant to many antibiotics

## 2025-03-12 NOTE — PROCEDURE NOTE - NSPROCNAME_GEN_A_CORE
Bronchoscopy
Tracheal Intubation
Bronchoscopy
Tracheal Intubation
Tracheal Intubation
Gastric Intubation/Gastric Lavage

## 2025-03-12 NOTE — PROGRESS NOTE ADULT - PROBLEM SELECTOR PLAN 4
w/ RVR  -Rate control per cards  -AC with heparin drip.  3/12: heparin on hold secondary to low plts and anemia for now:

## 2025-03-12 NOTE — PROGRESS NOTE ADULT - SUBJECTIVE AND OBJECTIVE BOX
[facility]   [phone]  RENAL FOLLOW UP NOTE  --------------------------------------------------------------------------------  Chief Complaint:    24 hour events/subjective:    seen earlier   intubated at the time     PAST HISTORY  --------------------------------------------------------------------------------  No significant changes to PMH, PSH, FHx, SHx, unless otherwise noted    ALLERGIES & MEDICATIONS  --------------------------------------------------------------------------------  Allergies    seasonal allergies (Unknown)  No Known Drug Allergies    Intolerances    latex (Rash)    Standing Inpatient Medications  albuterol/ipratropium for Nebulization 3 milliLiter(s) Nebulizer every 6 hours  atorvastatin 20 milliGRAM(s) Oral at bedtime  chlorhexidine 0.12% Liquid 15 milliLiter(s) Oral Mucosa every 12 hours  cholecalciferol 1000 Unit(s) Oral daily  dexMEDEtomidine Infusion 0.2 MICROgram(s)/kG/Hr IV Continuous <Continuous>  epoetin krystyna-epbx (RETACRIT) Injectable 72347 Unit(s) SubCutaneous <User Schedule>  escitalopram 15 milliGRAM(s) Oral with breakfast  gabapentin Solution 150 milliGRAM(s) Oral every 12 hours  influenza  Vaccine (HIGH DOSE) 0.5 milliLiter(s) IntraMuscular once  lacosamide IVPB 200 milliGRAM(s) IV Intermittent every 12 hours  lamoTRIgine 50 milliGRAM(s) Oral two times a day  lanolin Ointment 1 Application(s) Topical daily  levETIRAcetam   Injectable 750 milliGRAM(s) IV Push every 12 hours  levoFLOXacin IVPB      levoFLOXacin IVPB 750 milliGRAM(s) IV Intermittent every 48 hours  lurasidone 20 milliGRAM(s) Oral at bedtime  nystatin Powder 1 Application(s) Topical two times a day  pantoprazole  Injectable 40 milliGRAM(s) IV Push daily  polyethylene glycol 3350 17 Gram(s) Oral daily  propofol Infusion 50 MICROgram(s)/kG/Min IV Continuous <Continuous>  senna Syrup 10 milliLiter(s) Oral at bedtime  sevelamer carbonate Powder 1600 milliGRAM(s) Oral every 8 hours    PRN Inpatient Medications  artificial tears (preservative free) Ophthalmic Solution 1 Drop(s) Both EYES every 6 hours PRN      REVIEW OF SYSTEMS  --------------------------------------------------------------------------------      VITALS/PHYSICAL EXAM  --------------------------------------------------------------------------------  T(C): 36.7 (03-12-25 @ 20:00), Max: 36.9 (03-12-25 @ 16:00)  HR: 77 (03-12-25 @ 20:00) (59 - 93)  BP: 100/59 (03-12-25 @ 20:00) (87/52 - 172/80)  RR: 17 (03-12-25 @ 20:00) (16 - 41)  SpO2: 96% (03-12-25 @ 20:00) (93% - 100%)  Wt(kg): --        03-11-25 @ 07:01  -  03-12-25 @ 07:00  --------------------------------------------------------  IN: 1282.5 mL / OUT: 900 mL / NET: 382.5 mL    03-12-25 @ 07:01  -  03-12-25 @ 20:46  --------------------------------------------------------  IN: 415.9 mL / OUT: 610 mL / NET: -194.1 mL      Physical Exam:  	      Gen: ill appearing male,  remains intubated  	Pulm: decrease bs, +coarse bs    	CV: No JVD. RRR, S1S2; no rub  	Abd: +BS, soft, nondistended  	UE: Warm, no cyanosis  no clubbing,  no edema;  	LE: Warm, no cyanosis  no clubbing, no edema    LABS/STUDIES  --------------------------------------------------------------------------------              8.1    6.57  >-----------<  143      [03-12-25 @ 00:26]              25.3     143  |  107  |  58  ----------------------------<  121      [03-12-25 @ 00:26]  4.4   |  22  |  2.81        Ca     9.7     [03-12-25 @ 00:26]      Mg     2.3     [03-12-25 @ 00:26]      Phos  3.9     [03-12-25 @ 00:26]    TPro  6.6  /  Alb  2.6  /  TBili  0.4  /  DBili  x   /  AST  40  /  ALT  34  /  AlkPhos  202  [03-12-25 @ 00:26]    PT/INR: PT 11.6 , INR 1.01       [03-12-25 @ 00:26]  PTT: 24.3       [03-12-25 @ 00:26]      Creatinine Trend:  SCr 2.81 [03-12 @ 00:26]  SCr 3.00 [03-11 @ 00:36]  SCr 2.95 [03-10 @ 00:18]  SCr 2.92 [03-09 @ 00:27]  SCr 2.40 [03-08 @ 00:16]          Ferritin [id number]      [02-23-25 @ 06:15]  TSH 0.87      [01-25-25 @ 11:31]

## 2025-03-12 NOTE — PROGRESS NOTE ADULT - SUBJECTIVE AND OBJECTIVE BOX
INTERVAL HPI/OVERNIGHT EVENTS:  - no overnight events.    SUBJECTIVE: Patient seen and examined at bedside. Denies CP, SOB. Slept well last night. Plan for trach today.       VITAL SIGNS:  ICU Vital Signs Last 24 Hrs  T(C): 36.8 (12 Mar 2025 04:00), Max: 37.3 (11 Mar 2025 08:00)  T(F): 98.2 (12 Mar 2025 04:00), Max: 99.1 (11 Mar 2025 08:00)  HR: 70 (12 Mar 2025 07:00) (56 - 102)  BP: 146/67 (12 Mar 2025 07:00) (113/57 - 166/74)  BP(mean): 96 (12 Mar 2025 07:00) (81 - 112)  ABP: --  ABP(mean): --  RR: 20 (12 Mar 2025 07:00) (16 - 30)  SpO2: 97% (12 Mar 2025 07:00) (95% - 100%)    O2 Parameters below as of 12 Mar 2025 05:43  Patient On (Oxygen Delivery Method): ventilator          Mode: AC/ CMV (Assist Control/ Continuous Mandatory Ventilation), RR (machine): 16, TV (machine): 450, FiO2: 30, PEEP: 6, ITime: 1, MAP: 9, PIP: 27  Plateau pressure:   P/F ratio:     03-11 @ 07:01  -  03-12 @ 07:00  --------------------------------------------------------  IN: 1282.5 mL / OUT: 900 mL / NET: 382.5 mL      CAPILLARY BLOOD GLUCOSE      POCT Blood Glucose.: 114 mg/dL (11 Mar 2025 11:55)    ECG:    PHYSICAL EXAM:    General: intubated, NAD  HEENT: atraumatic, normocephalic. EOMI  Neck: supple, no JVD  Respiratory: CTAB, no increased work of breathing  Cardiovascular: RRR  Abdomen: soft, NT, ND  Extremities: 2+ peripheral pulses b/l  Neurological: answering questions appropriately     MEDICATIONS:  MEDICATIONS  (STANDING):  albuterol/ipratropium for Nebulization 3 milliLiter(s) Nebulizer every 6 hours  atorvastatin 20 milliGRAM(s) Oral at bedtime  chlorhexidine 0.12% Liquid 15 milliLiter(s) Oral Mucosa every 12 hours  cholecalciferol 1000 Unit(s) Oral daily  desmopressin IVPB 35 MICROGram(s) IV Intermittent once  dexMEDEtomidine Infusion 0.2 MICROgram(s)/kG/Hr (4.46 mL/Hr) IV Continuous <Continuous>  epoetin krystyna-epbx (RETACRIT) Injectable 55251 Unit(s) SubCutaneous <User Schedule>  escitalopram 15 milliGRAM(s) Oral with breakfast  gabapentin Solution 150 milliGRAM(s) Oral every 12 hours  influenza  Vaccine (HIGH DOSE) 0.5 milliLiter(s) IntraMuscular once  lacosamide IVPB 200 milliGRAM(s) IV Intermittent every 12 hours  lamoTRIgine 50 milliGRAM(s) Oral two times a day  lanolin Ointment 1 Application(s) Topical daily  levETIRAcetam   Injectable 750 milliGRAM(s) IV Push every 12 hours  levoFLOXacin IVPB      levoFLOXacin IVPB 750 milliGRAM(s) IV Intermittent every 48 hours  lurasidone 20 milliGRAM(s) Oral at bedtime  nystatin Powder 1 Application(s) Topical two times a day  pantoprazole  Injectable 40 milliGRAM(s) IV Push daily  polyethylene glycol 3350 17 Gram(s) Oral daily  senna Syrup 10 milliLiter(s) Oral at bedtime  sevelamer carbonate Powder 1600 milliGRAM(s) Oral every 8 hours    MEDICATIONS  (PRN):  artificial tears (preservative free) Ophthalmic Solution 1 Drop(s) Both EYES every 6 hours PRN Dry Eyes      ALLERGIES:  Allergies    seasonal allergies (Unknown)  No Known Drug Allergies    Intolerances    latex (Rash)      LABS:                        8.1    6.57  )-----------( 143      ( 12 Mar 2025 00:26 )             25.3     03-12    143  |  107  |  58[H]  ----------------------------<  121[H]  4.4   |  22  |  2.81[H]    Ca    9.7      12 Mar 2025 00:26  Phos  3.9     03-12  Mg     2.3     03-12    TPro  6.6  /  Alb  2.6[L]  /  TBili  0.4  /  DBili  x   /  AST  40  /  ALT  34  /  AlkPhos  202[H]  03-12    PT/INR - ( 12 Mar 2025 00:26 )   PT: 11.6 sec;   INR: 1.01 ratio         PTT - ( 12 Mar 2025 00:26 )  PTT:24.3 sec  Urinalysis Basic - ( 12 Mar 2025 00:26 )    Color: x / Appearance: x / SG: x / pH: x  Gluc: 121 mg/dL / Ketone: x  / Bili: x / Urobili: x   Blood: x / Protein: x / Nitrite: x   Leuk Esterase: x / RBC: x / WBC x   Sq Epi: x / Non Sq Epi: x / Bacteria: x        RADIOLOGY & ADDITIONAL TESTS: Reviewed.

## 2025-03-12 NOTE — PROGRESS NOTE ADULT - SUBJECTIVE AND OBJECTIVE BOX
[facility] INPATIENT PROGRESS NOTE     Interval Hx:   03-12-25: Mr. Gr was seen at bedside today.    Meds:   MEDICATIONS  (STANDING):  albuterol/ipratropium for Nebulization 3 milliLiter(s) Nebulizer every 6 hours  atorvastatin 20 milliGRAM(s) Oral at bedtime  chlorhexidine 0.12% Liquid 15 milliLiter(s) Oral Mucosa every 12 hours  cholecalciferol 1000 Unit(s) Oral daily  desmopressin IVPB 35 MICROGram(s) IV Intermittent once  dexMEDEtomidine Infusion 0.2 MICROgram(s)/kG/Hr (4.46 mL/Hr) IV Continuous <Continuous>  epoetin krystyna-epbx (RETACRIT) Injectable 72900 Unit(s) SubCutaneous <User Schedule>  escitalopram 15 milliGRAM(s) Oral with breakfast  gabapentin Solution 150 milliGRAM(s) Oral every 12 hours  influenza  Vaccine (HIGH DOSE) 0.5 milliLiter(s) IntraMuscular once  lacosamide IVPB 200 milliGRAM(s) IV Intermittent every 12 hours  lamoTRIgine 50 milliGRAM(s) Oral two times a day  lanolin Ointment 1 Application(s) Topical daily  levETIRAcetam   Injectable 750 milliGRAM(s) IV Push every 12 hours  levoFLOXacin IVPB      levoFLOXacin IVPB 750 milliGRAM(s) IV Intermittent every 48 hours  lurasidone 20 milliGRAM(s) Oral at bedtime  nystatin Powder 1 Application(s) Topical two times a day  pantoprazole  Injectable 40 milliGRAM(s) IV Push daily  polyethylene glycol 3350 17 Gram(s) Oral daily  senna Syrup 10 milliLiter(s) Oral at bedtime  sevelamer carbonate Powder 1600 milliGRAM(s) Oral every 8 hours    MEDICATIONS  (PRN):  artificial tears (preservative free) Ophthalmic Solution 1 Drop(s) Both EYES every 6 hours PRN Dry Eyes    Vital Signs Last 24 Hrs  T(C): 36.8 (12 Mar 2025 04:00), Max: 37.3 (11 Mar 2025 08:00)  T(F): 98.2 (12 Mar 2025 04:00), Max: 99.1 (11 Mar 2025 08:00)  HR: 64 (12 Mar 2025 04:00) (55 - 102)  BP: 159/70 (12 Mar 2025 04:00) (113/57 - 160/78)  BP(mean): 101 (12 Mar 2025 04:00) (80 - 112)  RR: 17 (12 Mar 2025 04:00) (16 - 30)  SpO2: 100% (12 Mar 2025 04:00) (96% - 100%)    Parameters below as of 12 Mar 2025 04:00  Patient On (Oxygen Delivery Method): ventilator    O2 Concentration (%): 30    Physical Exam:  Gen: NAD, intubated sedated   HEENT: EOMI, MMM  Chest: equal chest rise  Cardiac: regular   Abd: non distended    Labs:                        8.1    6.57  )-----------( 143      ( 12 Mar 2025 00:26 )             25.3     CBC Full  -  ( 12 Mar 2025 00:26 )  WBC Count : 6.57 K/uL  RBC Count : 2.58 M/uL  Hemoglobin : 8.1 g/dL  Hematocrit : 25.3 %  Platelet Count - Automated : 143 K/uL  Mean Cell Volume : 98.1 fl  Mean Cell Hemoglobin : 31.4 pg  Mean Cell Hemoglobin Concentration : 32.0 g/dL    03-12    143  |  107  |  58[H]  ----------------------------<  121[H]  4.4   |  22  |  2.81[H]    Ca    9.7      12 Mar 2025 00:26  Phos  3.9     03-12  Mg     2.3     03-12    TPro  6.6  /  Alb  2.6[L]  /  TBili  0.4  /  DBili  x   /  AST  40  /  ALT  34  /  AlkPhos  202[H]  03-12    PT/INR - ( 12 Mar 2025 00:26 )   PT: 11.6 sec;   INR: 1.01 ratio         PTT - ( 12 Mar 2025 00:26 )  PTT:24.3 sec  Bilirubin Total: 0.4 mg/dL (03-12-25 @ 00:26)   [facility] INPATIENT PROGRESS NOTE     Interval Hx:   03-12-25: Mr. Gr was seen at bedside today, continues in MICU, intubated and sedated, without signs of bleeding     Meds:   MEDICATIONS  (STANDING):  albuterol/ipratropium for Nebulization 3 milliLiter(s) Nebulizer every 6 hours  atorvastatin 20 milliGRAM(s) Oral at bedtime  chlorhexidine 0.12% Liquid 15 milliLiter(s) Oral Mucosa every 12 hours  cholecalciferol 1000 Unit(s) Oral daily  desmopressin IVPB 35 MICROGram(s) IV Intermittent once  dexMEDEtomidine Infusion 0.2 MICROgram(s)/kG/Hr (4.46 mL/Hr) IV Continuous <Continuous>  epoetin krystyna-epbx (RETACRIT) Injectable 09425 Unit(s) SubCutaneous <User Schedule>  escitalopram 15 milliGRAM(s) Oral with breakfast  gabapentin Solution 150 milliGRAM(s) Oral every 12 hours  influenza  Vaccine (HIGH DOSE) 0.5 milliLiter(s) IntraMuscular once  lacosamide IVPB 200 milliGRAM(s) IV Intermittent every 12 hours  lamoTRIgine 50 milliGRAM(s) Oral two times a day  lanolin Ointment 1 Application(s) Topical daily  levETIRAcetam   Injectable 750 milliGRAM(s) IV Push every 12 hours  levoFLOXacin IVPB      levoFLOXacin IVPB 750 milliGRAM(s) IV Intermittent every 48 hours  lurasidone 20 milliGRAM(s) Oral at bedtime  nystatin Powder 1 Application(s) Topical two times a day  pantoprazole  Injectable 40 milliGRAM(s) IV Push daily  polyethylene glycol 3350 17 Gram(s) Oral daily  senna Syrup 10 milliLiter(s) Oral at bedtime  sevelamer carbonate Powder 1600 milliGRAM(s) Oral every 8 hours    MEDICATIONS  (PRN):  artificial tears (preservative free) Ophthalmic Solution 1 Drop(s) Both EYES every 6 hours PRN Dry Eyes    Vital Signs Last 24 Hrs  T(C): 36.8 (12 Mar 2025 04:00), Max: 37.3 (11 Mar 2025 08:00)  T(F): 98.2 (12 Mar 2025 04:00), Max: 99.1 (11 Mar 2025 08:00)  HR: 64 (12 Mar 2025 04:00) (55 - 102)  BP: 159/70 (12 Mar 2025 04:00) (113/57 - 160/78)  BP(mean): 101 (12 Mar 2025 04:00) (80 - 112)  RR: 17 (12 Mar 2025 04:00) (16 - 30)  SpO2: 100% (12 Mar 2025 04:00) (96% - 100%)    Parameters below as of 12 Mar 2025 04:00  Patient On (Oxygen Delivery Method): ventilator    O2 Concentration (%): 30    Physical Exam:  Gen: NAD, intubated sedated   HEENT: EOMI, MMM  Chest: equal chest rise  Cardiac: regular   Abd: non distended    Labs:                        8.1    6.57  )-----------( 143      ( 12 Mar 2025 00:26 )             25.3     CBC Full  -  ( 12 Mar 2025 00:26 )  WBC Count : 6.57 K/uL  RBC Count : 2.58 M/uL  Hemoglobin : 8.1 g/dL  Hematocrit : 25.3 %  Platelet Count - Automated : 143 K/uL  Mean Cell Volume : 98.1 fl  Mean Cell Hemoglobin : 31.4 pg  Mean Cell Hemoglobin Concentration : 32.0 g/dL    03-12    143  |  107  |  58[H]  ----------------------------<  121[H]  4.4   |  22  |  2.81[H]    Ca    9.7      12 Mar 2025 00:26  Phos  3.9     03-12  Mg     2.3     03-12    TPro  6.6  /  Alb  2.6[L]  /  TBili  0.4  /  DBili  x   /  AST  40  /  ALT  34  /  AlkPhos  202[H]  03-12    PT/INR - ( 12 Mar 2025 00:26 )   PT: 11.6 sec;   INR: 1.01 ratio         PTT - ( 12 Mar 2025 00:26 )  PTT:24.3 sec  Bilirubin Total: 0.4 mg/dL (03-12-25 @ 00:26)

## 2025-03-12 NOTE — CONSULT NOTE ADULT - ATTENDING COMMENTS
Patient seen on rounds. Agree w above. 69 year old male with history of hypertension, hyperlipemia VAZQUEZ (on CPAP at bedtime, NC 2 liters during the day), CKD stage 3, epilepsy, schizophrenia, developmental delay, metastatic testicular cancer, presented with acute on chronic hypoxemic respiratory failure, secondary to (a) COVID-19 infection, (b) superimposed pneumonia after recent influenza infection, and/or (c) fluid overload. Patient extubated last week and subsequently re-intubated due to inability to clear airway secretions. Planned for trach today. GI consulted for PEG evaluation.

## 2025-03-12 NOTE — PROGRESS NOTE ADULT - ASSESSMENT
69 year old male with a past medical history of hypertension, hyperlipemia VAZQUEZ (on CPAP at bedtime, NC 2 liters during the day), CKD stage 3, epilepsy, schizophrenia, developmental delay, metastatic testicular cancer (s/p orchiectomy, actively on chemotherapy, last dose of etoposide/cisplatin on 6/2024), presenting with acute on chronic hypoxemic respiratory failure, secondary to (a) COVID-19 infection, (b) superimposed pneumonia after recent influenza infection, and/or (c) fluid overload. MICU consulted and accepted after RRT due to increased work of breathing.    NEURO:  #Mental status:  - hx of developmental delay  - Worsening mental status 3/8, likely iso respiratory distress  - Re-intubated 3/8, sedated with fent and prop    #Epilepsy:  - Continue with home meds which include Lacosamide, Lurasidone, Lamotrigine - increased to 50 BID  - EEG negative    #Brain Mets:  - MRI brain now with 5.4 mm enhancing lesion in the LEFT middle cerebellar peduncle with associated edema suspicious for metastases    # Schizophrenia  - Lurasidone halved to reduce oversedation    CV:  #Afib w/RVR  - New onset Afib RVR (on tele, confirmed with EKG 2/19)   - off Heparin drip iso fluctuating H/H  - restart BB if rates remain uncontrolled    #AHRF  - Patient admitted to the hospital for AHRF, found to be COVID (+)  - Likely 2/2 PNA, does not appear to CHF decompensation  - worsening secretions and consolidations on 3/8, iso fever/leukocytosis may be new PNA. Management as below  - plan for bedside trach 3/12    RENAL:  #SHAGUFTA on CKD3  - Hx of CKD stage 3, Cr 2.38 at baseline, peak at 4.24  - In setting of COVID (+)  - appreciate nephro input   - continue Renvela    GI:  #PEG eval  - GI emailed    #Diet: Tube feeds via OG tube  #Bowel Regimen  - On Senna and Miralax    ENDO:  No acute issues  Thyroid nodule noted in prior notes    HEMATOLOGIC:  #Anemia  - CKD vs. cancer vs. sepsis  - 1 unit PRBCs given  - CTM CBC  - No sign of bleeding  - retacrit 10,000 tiw sq    #DVT prophylaxis   - heparin gtt held iso H/H drop     ID:  #Sepsis  #PNA  Pseudomonas in sputum, zosyn resistant. Also Pseudomonas in Ucx  - levaquin renally dosed 750 q48h (3/9-3/16) to complete 7d course      SKIN:  #Lines:  2x PIV    Ethics:  - Full Code  - Contact: Sister Ester [phone]

## 2025-03-12 NOTE — CONSULT NOTE ADULT - SUBJECTIVE AND OBJECTIVE BOX
Chief Complaint:  Patient is a 69y old  Male who presents with a chief complaint of Acute on chronic hypoxemic respiratory failure (12 Mar 2025 07:46)    HPI:  69 year old male with a past medical history of hypertension, hyperlipemia VAZQUEZ (on CPAP at bedtime, NC 2 liters during the day), CKD stage 3, epilepsy, schizophrenia, developmental delay, metastatic testicular cancer (s/p orchiectomy, actively on chemotherapy, last dose of etoposide/cisplatin on 2024), presenting with acute on chronic hypoxemic respiratory failure, secondary to (a) COVID-19 infection, (b) superimposed pneumonia after recent influenza infection, and/or (c) fluid overload. Patient extubated last week and subsequently re-intubated due to inability to clear airway secretions. Family is now interested in trach/PEG. Planned for trach today. GI consulted for PEG evaluation.    Allergies:  latex (Rash)  seasonal allergies (Unknown)  No Known Drug Allergies      Home Medications:    Hospital Medications:  albuterol/ipratropium for Nebulization 3 milliLiter(s) Nebulizer every 6 hours  artificial tears (preservative free) Ophthalmic Solution 1 Drop(s) Both EYES every 6 hours PRN  atorvastatin 20 milliGRAM(s) Oral at bedtime  chlorhexidine 0.12% Liquid 15 milliLiter(s) Oral Mucosa every 12 hours  cholecalciferol 1000 Unit(s) Oral daily  dexMEDEtomidine Infusion 0.2 MICROgram(s)/kG/Hr IV Continuous <Continuous>  epoetin krystyna-epbx (RETACRIT) Injectable 01356 Unit(s) SubCutaneous <User Schedule>  escitalopram 15 milliGRAM(s) Oral with breakfast  gabapentin Solution 150 milliGRAM(s) Oral every 12 hours  influenza  Vaccine (HIGH DOSE) 0.5 milliLiter(s) IntraMuscular once  lacosamide IVPB 200 milliGRAM(s) IV Intermittent every 12 hours  lamoTRIgine 50 milliGRAM(s) Oral two times a day  lanolin Ointment 1 Application(s) Topical daily  levETIRAcetam   Injectable 750 milliGRAM(s) IV Push every 12 hours  levoFLOXacin IVPB      levoFLOXacin IVPB 750 milliGRAM(s) IV Intermittent every 48 hours  lurasidone 20 milliGRAM(s) Oral at bedtime  norepinephrine Infusion 0.05 MICROgram(s)/kG/Min IV Continuous <Continuous>  nystatin Powder 1 Application(s) Topical two times a day  pantoprazole  Injectable 40 milliGRAM(s) IV Push daily  polyethylene glycol 3350 17 Gram(s) Oral daily  propofol Infusion 50 MICROgram(s)/kG/Min IV Continuous <Continuous>  senna Syrup 10 milliLiter(s) Oral at bedtime  sevelamer carbonate Powder 1600 milliGRAM(s) Oral every 8 hours      PMHX/PSHX:  Hypertension, unspecified type    Other hyperlipidemia    History of epilepsy    Paranoid schizophrenia    Obstructive sleep apnea    Testicular cancer    H/O Spinal surgery    ROS:  Unable to obtain    PHYSICAL EXAM:  GENERAL: critically ill  NEURO: not alert  HEENT: anicteric sclera, no conjunctival pallor appreciated  CHEST: intubated, mechanical breath sounds  CARDIAC: regular rate, +S1/S2  ABDOMEN: soft, nondistended, nontender, no rebound or guarding  EXTREMITIES: warm, well perfused  SKIN: no lesions noted    Vital Signs:  Vital Signs Last 24 Hrs  T(C): 36.5 (12 Mar 2025 12:00), Max: 37.1 (11 Mar 2025 16:00)  T(F): 97.7 (12 Mar 2025 12:00), Max: 98.8 (11 Mar 2025 16:00)  HR: 74 (12 Mar 2025 12:15) (59 - 102)  BP: 172/80 (12 Mar 2025 12:15) (87/52 - 172/80)  BP(mean): 115 (12 Mar 2025 12:15) (64 - 115)  RR: 20 (12 Mar 2025 12:15) (16 - 30)  SpO2: 97% (12 Mar 2025 12:15) (94% - 100%)    Parameters below as of 12 Mar 2025 11:34  Patient On (Oxygen Delivery Method): ventilator      Daily     Daily Weight in k.8 (12 Mar 2025 00:00)    LABS:                        8.1    6.57  )-----------( 143      ( 12 Mar 2025 00:26 )             25.3     Mean Cell Volume: 98.1 fl (25 @ 00:26)        143  |  107  |  58[H]  ----------------------------<  121[H]  4.4   |  22  |  2.81[H]    Ca    9.7      12 Mar 2025 00:26  Phos  3.9     03-12  Mg     2.3     03-12    TPro  6.6  /  Alb  2.6[L]  /  TBili  0.4  /  DBili  x   /  AST  40  /  ALT  34  /  AlkPhos  202[H]  03-12    LIVER FUNCTIONS - ( 12 Mar 2025 00:26 )  Alb: 2.6 g/dL / Pro: 6.6 g/dL / ALK PHOS: 202 U/L / ALT: 34 U/L / AST: 40 U/L / GGT: x           PT/INR - ( 12 Mar 2025 00:26 )   PT: 11.6 sec;   INR: 1.01 ratio         PTT - ( 12 Mar 2025 00:26 )  PTT:24.3 sec  Urinalysis Basic - ( 12 Mar 2025 00:26 )    Color: x / Appearance: x / SG: x / pH: x  Gluc: 121 mg/dL / Ketone: x  / Bili: x / Urobili: x   Blood: x / Protein: x / Nitrite: x   Leuk Esterase: x / RBC: x / WBC x   Sq Epi: x / Non Sq Epi: x / Bacteria: x                              8.1    6.57  )-----------( 143      ( 12 Mar 2025 00:26 )             25.3                         8.1    6.95  )-----------( 143      ( 11 Mar 2025 00:37 )             26.0                         7.7    7.13  )-----------( 143      ( 10 Mar 2025 08:32 )             24.6                         6.6    7.07  )-----------( 148      ( 10 Mar 2025 00:18 )             22.2

## 2025-03-12 NOTE — CONSULT NOTE ADULT - ASSESSMENT
69 year old male with a past medical history of hypertension, hyperlipemia VAZQUEZ (on CPAP at bedtime, NC 2 liters during the day), CKD stage 3, epilepsy, schizophrenia, developmental delay, metastatic testicular cancer (s/p orchiectomy, on chemotherapy, last dose of etoposide/cisplatin on 6/2024) and with brain mets, presented with acute on chronic hypoxemic respiratory failure, secondary to COVID-19 infection with superimposed pneumonia after recent influenza infection s/p extubation and re-intubation now planned for trach    #PEG evaluation  #hypoxic respiratory failure, s/p multiple intubations  #new onset AF, off AC  #metastatic testicular cancer with brain metastases  #anemia  Patient admitted for hypoxic respiratory failure 2/2 COVID/bacterial PNA and fluid overload now with course c/b re-intubation and planned for bedside trach today. Course further c/b anemia with Hgb drop to 6.6 from baseline ~8 s/p 1U PRBC with improvement and stabilization to ~8 for the past 48h. No reported overt signs of GI bleeding. GI called for PEG evaluation.    Recommendations  - will discuss with family regarding PEG placement; tentatively make NPO after midnight  - cardiology follow up for optimization given new onset AF  - NGT with TF in interim    Note and recommendations are incomplete until reviewed and attested by attending.    Ashley Mart MD  GI/Hepatology Fellow, PGY5  [employer] Pager [phone] or [employer] Pager [phone]  Teams preferred (7AM to 5PM); after 5PM, call GI fellow on call    On Weekends/Holidays (All Day) and Weekdays after 5PM to 8AM  For non-urgent consults, please email [email or web address] and [email or web address]  For urgent consults, please contact on call GI team. See Amion schedule ([facility]), [employer] paging system ([facility]), or call hospital  ([facility])

## 2025-03-12 NOTE — PROGRESS NOTE ADULT - ATTENDING COMMENTS
Patient examined and case reviewed in detail on bedside rounds   Patient is critically ill and unstable on vent/pressors For trach today with need for deep sedation with close monitoring     I provided 110 minutes of critical care today for this patient. This excludes time spent on procedures and teaching

## 2025-03-12 NOTE — PROGRESS NOTE ADULT - ASSESSMENT
Mr. Gr is a 69 year old male with PMHx of seizure disorder, sleep apnea, HTN, chronic idiopathic constipation, stage III CKD, severe obesity, secondary hyperparathyroidism, anemia, thrombocytopenia, hyperlipidemia, testicular cancer under treatment with Dr. Ovalles who is admitted for acute hypoxic respiratory failure secondary to COVID vs superimposed pneumonia with new onset thrombocytopenia. He was recently discharged 02/06/2025 after a tenous stay including intubation in the ICU for respiratory failure in setting of seizure. He had recovered and was discharged to rehab.      Former smoker, quit 14y prior, denied ETOH, drug use. Lives at home. Does not have children. Denies family history of blood disorders or malignancy.  Denies previous workplace related exposures.  Denies previous history of blood transfusions.  Denied history of thromboses.  Denied history of  requiring anticoagulation.     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  Margins were negative.  pT3Nx. CT C/A/P with few left para-aortic and left iliac chain lymph nodes largest measuring 8.1 cm left para-aortic node, bilateral subcentimeter noncalcified pulmonary nodules measuring up to 7 mm. AFP, LDH, hCG were all elevated. 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 Started on adjuvant therapy with Cisplatin+Etoposide, so far completed 4 cycles of treatment with cisplatin dose reduced 50% and Etoposide 50% due to thrombocytopenia.      02/19/2025: on HFNC, noted tachycardia and fever, Klebsiella in urine as well, thrombocytopenia stable/improving, no signs of active bleeding     02/20/2025: developed A.fib with RVR and was placed on heparin drip and pending cardiology eval    02/21/2025: awake, on AVAPS overnight, noted RRT for hypoxia as pt removed HFNC at some point in time, good response thereafter     02/22/2025:  continues on AVAPS this morning, noted RRT overnight for hypoxia, hypercapnia, ICU following, US LE negative for DVT, counts overall stable/improved     02/23/2025: progressive respiratory failure, noted ongoing RTT this morning with pending intubation and transfer to MICU     02/24/2025: intubated 02/23/2025, sedated, on pressor support, no signs of bleeding, counts noted, no signs of bleeding     02/25/2025: continues in MICU, intubated and sedated, no signs of bleeding    02/26/2025: continues in MICU, intubated, without signs of bleeding, continues on heparin drip     02/27/2025:  remains under the care of the ICU, intubated, no signs of bleeding and continues on heparin drip, counts stable, has not required PRBC support this admission    02/28/2025: continues under the care of MICU, continues intubated, no signs of bleeding, on heparin drip    03/01/2025: continues in MICU, intubated, direct josefina IgG positive, eluate negative, not likely to be clinically significant     03/02/2025:  continues in MICU, nursing staff at bedside, no overnight events noted. CTH without acute intracranial pathology     03/03/2025: continues in MICU, intubated, on heparin drip, 1u PRBC overnight, no signs of bleeding, platelet count stable     03/04/2025: awake, moving arms spontaneously on exam, continues without much interaction however. No signs of bleeding, continues heparin drip, continues intubated in MICU     03/05/2025: extubated 03/04/2025, continues in MICU, awake and alert this morning and able to speak full sentences, discussed/reviewed findings, continues on heparin drip     03/06/2025: nursing staff at bedside, bipap overnight, without signs of bleeding, counts noted stable, renal function improving     03/07/2025:  no signs of bleeding, counts noted, continues heparin drip, continues in MICU    03/08/2025: on HFNC, no signs of bleeding, although alert and answering questions, not back to his baseline yet, continues in MICU    03/09/2025: continues in MICU, s/p re-intubation 03/08/2025 given respiratory compromise in the setting of copious secretions as evidenced by bronchoscopy, in setting of fever, now sedated, mild crusting of blood around mouth, without active sign of bleeding, counts noted reviewed, continues on heparin drip     03/10/2025: continues in MICU, no signs of bleeding, discussed with nursing staff at bedside, receiving 1u PRBC due to H/H downtrend, sputum culture with Pseudomonas, ICU and ID recs noted, continues to be intubated     03/11/2025: continues in MICU, intubated and sedated, noted counts, without signs of bleeding, appropriate response to transfusion      #Acute hypoxic respiratory failure  # COVID  - CT noted with bilateral GGO  - ID following  - Pulmonary following  - Antibiotics per primary team/MICU and ID   - Intubated 02/23/2025 AM, MICU   - extubated 03/04/2025  - Pseudomonas from sputum culture   - Re-intubated 03/09/2025 due to increased work of breathing in setting of increased secretions, not protecting airway, tachypnea, hypoxia     # Thrombocytopenia   - Did occur during most recent admission and improved to normal prior to discharge   - Without signs of bleeding  - Could be multifactorial, possibly due to infection   - Continue to trend  - Transfuse to maintain Plt > 10k unless actively bleeding then maintain > 50k     # Brain lesion  - pt with hx of seizures  - MRI brain now with 5.4 mm enhancing lesion in the LEFT middle cerebellar peduncle with associated edema suspicious for metastases  - MRI Lumbar negative for acute disease  - Small lesion without mass effect or edema, recommended to correlate per neurology if foci and locus are concordant with seizure activity  - Neurology recs noted, new lesion not likely to cause seizure  - It is rare, but nonetheless not impossible, for testicular cancer to be metastatic to the brain  - He will need another PET-CT, can be completed outpatient once stable if concern can proceed with biopsy at that time   - Previous endocrinology eval for thyroid nodule noted  - AFP/[facility] normal,  previously   - Repeat CTH without acute intracranial pathology       # 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 Ovalles, agree with current plan  - Follow up as outpatient with Franki Ovalles MD     # Acute kidney injury on CKD Stage IIIA  - Likely multifactorial  - Nephrology consult and recs noted  - Continue to trend     # Normocytic anemia  - Chronic, has hx of PRBC during chemo 07/2024  - Noted Anti E, Anti-K Anti-C antibodies previously  - direct josefina IgG positive, eluate negative, not likely to be clinically significant   - s/p 2u PRBC 03/03/2025 and 03/10/2025   - Monitor for bleeding  - Recommend to transfuse to maintain Hb > 7    # Klebsiella UTI  # Pseudomonas UTI   -Antibiotics per ID and primary team       Recommendations  - Trend CBC daily, obtain post transfusion CBC today   - Transfuse to maintain Hb > 7   - Transfuse to maintain Plt >10k unless active bleeding then >50k   - Monitor renal function  - Cardiology follow up to address anticoagulation, was on heparin drip   - f/u ongoing ICU recs         Thank you for allowing me to participate in the care of Mr. Gr please do not hesitate to call or text me if you have further questions or concerns.     Brayan Mart MD  [facility] [place]   Division of Hematology/Oncology  [address] [place] [address], [address]  [place] [address]  P: [phone]  F: [phone]    Attestation:    ----Pt evaluated including face-to-face interaction in addition to chart review, reviewing treatment plan, and managing the patient’s chronic diagnoses as listed in the assessment----        Mr. Gr is a 69 year old male with PMHx of seizure disorder, sleep apnea, HTN, chronic idiopathic constipation, stage III CKD, severe obesity, secondary hyperparathyroidism, anemia, thrombocytopenia, hyperlipidemia, testicular cancer under treatment with Dr. Ovalles who is admitted for acute hypoxic respiratory failure secondary to COVID vs superimposed pneumonia with new onset thrombocytopenia. He was recently discharged 02/06/2025 after a tenous stay including intubation in the ICU for respiratory failure in setting of seizure. He had recovered and was discharged to rehab.      Former smoker, quit 14y prior, denied ETOH, drug use. Lives at home. Does not have children. Denies family history of blood disorders or malignancy.  Denies previous workplace related exposures.  Denies previous history of blood transfusions.  Denied history of thromboses.  Denied history of  requiring anticoagulation.     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  Margins were negative.  pT3Nx. CT C/A/P with few left para-aortic and left iliac chain lymph nodes largest measuring 8.1 cm left para-aortic node, bilateral subcentimeter noncalcified pulmonary nodules measuring up to 7 mm. AFP, LDH, hCG were all elevated. 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 Started on adjuvant therapy with Cisplatin+Etoposide, so far completed 4 cycles of treatment with cisplatin dose reduced 50% and Etoposide 50% due to thrombocytopenia.      02/19/2025: on HFNC, noted tachycardia and fever, Klebsiella in urine as well, thrombocytopenia stable/improving, no signs of active bleeding     02/20/2025: developed A.fib with RVR and was placed on heparin drip and pending cardiology eval    02/21/2025: awake, on AVAPS overnight, noted RRT for hypoxia as pt removed HFNC at some point in time, good response thereafter     02/22/2025:  continues on AVAPS this morning, noted RRT overnight for hypoxia, hypercapnia, ICU following, US LE negative for DVT, counts overall stable/improved     02/23/2025: progressive respiratory failure, noted ongoing RTT this morning with pending intubation and transfer to MICU     02/24/2025: intubated 02/23/2025, sedated, on pressor support, no signs of bleeding, counts noted, no signs of bleeding     02/25/2025: continues in MICU, intubated and sedated, no signs of bleeding    02/26/2025: continues in MICU, intubated, without signs of bleeding, continues on heparin drip     02/27/2025:  remains under the care of the ICU, intubated, no signs of bleeding and continues on heparin drip, counts stable, has not required PRBC support this admission    02/28/2025: continues under the care of MICU, continues intubated, no signs of bleeding, on heparin drip    03/01/2025: continues in MICU, intubated, direct josefina IgG positive, eluate negative, not likely to be clinically significant     03/02/2025:  continues in MICU, nursing staff at bedside, no overnight events noted. CTH without acute intracranial pathology     03/03/2025: continues in MICU, intubated, on heparin drip, 1u PRBC overnight, no signs of bleeding, platelet count stable     03/04/2025: awake, moving arms spontaneously on exam, continues without much interaction however. No signs of bleeding, continues heparin drip, continues intubated in MICU     03/05/2025: extubated 03/04/2025, continues in MICU, awake and alert this morning and able to speak full sentences, discussed/reviewed findings, continues on heparin drip     03/06/2025: nursing staff at bedside, bipap overnight, without signs of bleeding, counts noted stable, renal function improving     03/07/2025:  no signs of bleeding, counts noted, continues heparin drip, continues in MICU    03/08/2025: on HFNC, no signs of bleeding, although alert and answering questions, not back to his baseline yet, continues in MICU    03/09/2025: continues in MICU, s/p re-intubation 03/08/2025 given respiratory compromise in the setting of copious secretions as evidenced by bronchoscopy, in setting of fever, now sedated, mild crusting of blood around mouth, without active sign of bleeding, counts noted reviewed, continues on heparin drip     03/10/2025: continues in MICU, no signs of bleeding, discussed with nursing staff at bedside, receiving 1u PRBC due to H/H downtrend, sputum culture with Pseudomonas, ICU and ID recs noted, continues to be intubated     03/11/2025: continues in MICU, intubated and sedated, noted counts, without signs of bleeding, appropriate response to transfusion    03/12/2025: continues in MICU, intubated and sedated, without signs of bleeding       #Acute hypoxic respiratory failure  # COVID  - CT noted with bilateral GGO  - ID following  - Pulmonary following  - Antibiotics per primary team/MICU and ID   - Intubated 02/23/2025 AM, MICU   - extubated 03/04/2025  - Pseudomonas from sputum culture   - Re-intubated 03/09/2025 due to increased work of breathing in setting of increased secretions, not protecting airway, tachypnea, hypoxia     # Thrombocytopenia   - Did occur during most recent admission and improved to normal prior to discharge   - Without signs of bleeding  - Could be multifactorial, possibly due to infection   - Continue to trend  - Transfuse to maintain Plt > 10k unless actively bleeding then maintain > 50k     # Brain lesion  - pt with hx of seizures  - MRI brain now with 5.4 mm enhancing lesion in the LEFT middle cerebellar peduncle with associated edema suspicious for metastases  - MRI Lumbar negative for acute disease  - Small lesion without mass effect or edema, recommended to correlate per neurology if foci and locus are concordant with seizure activity  - Neurology recs noted, new lesion not likely to cause seizure  - It is rare, but nonetheless not impossible, for testicular cancer to be metastatic to the brain  - He will need another PET-CT, can be completed outpatient once stable if concern can proceed with biopsy at that time   - Previous endocrinology eval for thyroid nodule noted  - AFP/[facility] normal,  previously   - Repeat CTH without acute intracranial pathology       # 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 Ovalles, agree with current plan  - Follow up as outpatient with Franki Ovalles MD     # Acute kidney injury on CKD Stage IIIA  - Likely multifactorial  - Nephrology consult and recs noted  - Continue to trend     # Normocytic anemia  - Chronic, has hx of PRBC during chemo 07/2024  - Noted Anti E, Anti-K Anti-C antibodies previously  - direct josefina IgG positive, eluate negative, not likely to be clinically significant   - s/p 2u PRBC 03/03/2025 and 03/10/2025   - Monitor for bleeding  - Recommend to transfuse to maintain Hb > 7    # Klebsiella UTI  # Pseudomonas UTI   -Antibiotics per ID and primary team       Recommendations  - Trend CBC daily, obtain post transfusion CBC today   - Transfuse to maintain Hb > 7   - Transfuse to maintain Plt >10k unless active bleeding then >50k   - Monitor renal function  - Cardiology follow up to address anticoagulation, was on heparin drip   - f/u ongoing ICU recs         Thank you for allowing me to participate in the care of Mr. Gr please do not hesitate to call or text me if you have further questions or concerns.     Brayan Mart MD  [facility] [place]   Division of Hematology/Oncology  [address], [address]  [place] [address]  P: [phone]  F: [phone]    Attestation:    ----Pt evaluated including face-to-face interaction in addition to chart review, reviewing treatment plan, and managing the patient’s chronic diagnoses as listed in the assessment----

## 2025-03-12 NOTE — PROGRESS NOTE ADULT - NUTRITIONAL ASSESSMENT
This patient has been assessed with a concern for Malnutrition and has been determined to have a diagnosis/diagnoses of Severe protein-calorie malnutrition.    This patient is being managed with:   Diet NPO after Midnight-     NPO Start Date: 11-Mar-2025   NPO Start Time: 23:59  Entered: Mar 11 2025  3:20PM    Diet NPO with Tube Feed-  Tube Feeding Modality: Nasogastric  Jevity 1.2 Garth (JEVITY1.2RTH)  Total Volume for 24 Hours (mL): 1080  Continuous  Starting Tube Feed Rate {mL per Hour}: 10  Increase Tube Feed Rate by (mL): 10     Every 4 hours  Until Goal Tube Feed Rate (mL per Hour): 60  Tube Feed Duration (in Hours): 18  Tube Feed Start Time: 11:00  Entered: Mar  9 2025 11:02AM

## 2025-03-12 NOTE — PROCEDURE NOTE - NSINDICATIONS_GEN_A_CORE
respiratory failure
AEDs/feeds
airway protection
critical patient/mental status change/respiratory failure

## 2025-03-12 NOTE — PROGRESS NOTE ADULT - PROBLEM SELECTOR PLAN 3
-CT chest with b/l GGO, new since 1/30/25. Likely COVID PNA +/- superimposed bacterial PNA  -S/p ABX  -ID f/u.  3/9: on iv zosyn again  3/10; changed to levofloxacin  3/12: cont antibiotics;

## 2025-03-12 NOTE — PROGRESS NOTE ADULT - SUBJECTIVE AND OBJECTIVE BOX
[facility]  JACINTO Horton Y. Patel, S. Shah, G. Casimir  [phone]  ([phone] - weekdays after 5pm and weekends)    Name: OSCAR MILAN  Age/Gender: 69y Male  MRN: [id number]    Interval History:  Patient seen and examined this morning.   Remains intubated, sedated.   Notes reviewed.   No acute events noted overnight.   Afebrile.   Allergies: seasonal allergies (Unknown)  No Known Drug Allergies      Objective:  Vitals:   T(F): 98.2 (03-12-25 @ 04:00), Max: 99.1 (03-11-25 @ 08:00)  HR: 70 (03-12-25 @ 07:00) (56 - 102)  BP: 146/67 (03-12-25 @ 07:00) (113/57 - 166/74)  RR: 20 (03-12-25 @ 07:00) (16 - 30)  SpO2: 97% (03-12-25 @ 07:00) (95% - 100%)  Physical Examination:  General: no acute distress  HEENT: NC/AT, upper lip scab, ETT  Respiratory: decreased breath sounds bilaterally   Cardiovascular: S1 S2 present, normal rate   Gastrointestinal: soft, nondistended, nontender   Extremities: no LE edema, no cyanosis  Skin: no visible rash    Laboratory Studies:  CBC:                       8.1    6.57  )-----------( 143      ( 12 Mar 2025 00:26 )             25.3     WBC Trend:  6.57 03-12-25 @ 00:26  6.95 03-11-25 @ 00:37  7.13 03-10-25 @ 08:32  7.07 03-10-25 @ 00:18  11.82 03-09-25 @ 00:27  13.54 03-08-25 @ 01:11  20.24 03-08-25 @ 00:16  14.83 03-06-25 @ 23:42  9.52 03-06-25 @ 00:18    CMP: 03-12    143  |  107  |  58[H]  ----------------------------<  121[H]  4.4   |  22  |  2.81[H]    Ca    9.7      12 Mar 2025 00:26  Phos  3.9     03-12  Mg     2.3     03-12    TPro  6.6  /  Alb  2.6[L]  /  TBili  0.4  /  DBili  x   /  AST  40  /  ALT  34  /  AlkPhos  202[H]  03-12    Creatinine: 2.81 mg/dL (03-12-25 @ 00:26)  Creatinine: 3.00 mg/dL (03-11-25 @ 00:36)  Creatinine: 2.95 mg/dL (03-10-25 @ 00:18)  Creatinine: 2.92 mg/dL (03-09-25 @ 00:27)  Creatinine: 2.40 mg/dL (03-08-25 @ 00:16)  Creatinine: 2.52 mg/dL (03-06-25 @ 23:42)  Creatinine: 2.92 mg/dL (03-06-25 @ 00:11)    LIVER FUNCTIONS - ( 12 Mar 2025 00:26 )  Alb: 2.6 g/dL / Pro: 6.6 g/dL / ALK PHOS: 202 U/L / ALT: 34 U/L / AST: 40 U/L / GGT: x          Microbiology: reviewed   Culture - Urine (collected 03-09-25 @ 18:14)  Source: Catheterized Catheterized  Final Report (03-11-25 @ 13:23):    >100,000 CFU/ml Pseudomonas aeruginosa  Organism: Pseudomonas aeruginosa (03-11-25 @ 13:23)  Organism: Pseudomonas aeruginosa (03-11-25 @ 13:23)      Method Type: MARIELENA      -  Amikacin: S <=16      -  Aztreonam: R >16      -  Cefepime: I 16      -  Ceftazidime: R >16      -  Ciprofloxacin: S <=0.25      -  Imipenem: S <=1      -  Levofloxacin: S <=0.5      -  Meropenem: S <=1      -  Piperacillin/Tazobactam: R >64    Culture - Bronchial (collected 03-08-25 @ 15:27)  Source: Bronchial Bronchial Lavage  Gram Stain (03-09-25 @ 00:20):    Moderate polymorphonuclear leukocytes per low power field    No Squamous epithelial cells per low power field    Moderate Gram Negative Rods per oil power field    Few Gram positive cocci in pairs per oil power field  Final Report (03-10-25 @ 15:13):    Moderate Pseudomonas aeruginosa    Commensal lucia consistent with body site  Organism: Pseudomonas aeruginosa (03-10-25 @ 15:13)  Organism: Pseudomonas aeruginosa (03-10-25 @ 15:13)      Method Type: MARIELENA      -  Aztreonam: S 8      -  Cefepime: S 8      -  Ceftazidime: S 8      -  Ciprofloxacin: S <=0.25      -  Imipenem: S <=1      -  Levofloxacin: S <=0.5      -  Meropenem: S <=1      -  Piperacillin/Tazobactam: R 64    Culture - Blood (collected 03-08-25 @ 00:00)  Source: Blood Blood  Preliminary Report (03-12-25 @ 04:01):    No growth at 4 days    Culture - Blood (collected 03-07-25 @ 23:30)  Source: Blood Blood  Preliminary Report (03-12-25 @ 04:01):    No growth at 4 days    Culture - Sputum (collected 03-07-25 @ 18:42)  Source: Sputum Sputum  Gram Stain (03-08-25 @ 07:01):    Rare polymorphonuclear leukocytes per low power field    Few Squamous epithelial cells per low power field    Moderate Gram Negative Rods seen per oil power field  Final Report (03-09-25 @ 16:20):    Numerous Pseudomonas aeruginosa    Commensal lucia consistent with body site  Organism: Pseudomonas aeruginosa (03-09-25 @ 16:20)  Organism: Pseudomonas aeruginosa (03-09-25 @ 16:20)      Method Type: MARIELENA      -  Aztreonam: S 8      -  Cefepime: S 8      -  Ceftazidime: S 8      -  Ciprofloxacin: S <=0.25      -  Imipenem: S <=1      -  Levofloxacin: S <=0.5      -  Meropenem: S <=1      -  Piperacillin/Tazobactam: R 64    Culture - Eye (collected 03-05-25 @ 16:15)  Source: Eye Conjunctiva-Right  Gram Stain (03-08-25 @ 00:03):    No polymorphonuclear cells seen    No organisms seen    by cytocentrifuge  Final Report (03-10-25 @ 19:22):    Rare Staphylococcus epidermidis  Organism: Staphylococcus epidermidis (03-10-25 @ 19:22)      Method Type: MARIELENA      -  Clindamycin: R 4      -  Erythromycin: S <=0.25      -  Gentamicin: S <=4 Should not be used as monotherapy      -  Oxacillin: R 2      -  Penicillin: R 1      -  Rifampin: S <=1 Should not be used as monotherapy      -  Tetracycline: S <=4      -  Trimethoprim/Sulfamethoxazole: S <=0.5/9.5      -  Vancomycin: S 1  Organism: Staphylococcus epidermidis (03-10-25 @ 19:22)    Radiology: reviewed     Medications:  albuterol/ipratropium for Nebulization 3 milliLiter(s) Nebulizer every 6 hours  artificial tears (preservative free) Ophthalmic Solution 1 Drop(s) Both EYES every 6 hours PRN  atorvastatin 20 milliGRAM(s) Oral at bedtime  chlorhexidine 0.12% Liquid 15 milliLiter(s) Oral Mucosa every 12 hours  cholecalciferol 1000 Unit(s) Oral daily  desmopressin IVPB 35 MICROGram(s) IV Intermittent once  dexMEDEtomidine Infusion 0.2 MICROgram(s)/kG/Hr IV Continuous <Continuous>  epoetin krystyna-epbx (RETACRIT) Injectable 70060 Unit(s) SubCutaneous <User Schedule>  escitalopram 15 milliGRAM(s) Oral with breakfast  gabapentin Solution 150 milliGRAM(s) Oral every 12 hours  influenza  Vaccine (HIGH DOSE) 0.5 milliLiter(s) IntraMuscular once  lacosamide IVPB 200 milliGRAM(s) IV Intermittent every 12 hours  lamoTRIgine 50 milliGRAM(s) Oral two times a day  lanolin Ointment 1 Application(s) Topical daily  levETIRAcetam   Injectable 750 milliGRAM(s) IV Push every 12 hours  levoFLOXacin IVPB      levoFLOXacin IVPB 750 milliGRAM(s) IV Intermittent every 48 hours  lurasidone 20 milliGRAM(s) Oral at bedtime  nystatin Powder 1 Application(s) Topical two times a day  pantoprazole  Injectable 40 milliGRAM(s) IV Push daily  polyethylene glycol 3350 17 Gram(s) Oral daily  senna Syrup 10 milliLiter(s) Oral at bedtime  sevelamer carbonate Powder 1600 milliGRAM(s) Oral every 8 hours    Current Antimicrobials:  levoFLOXacin IVPB      levoFLOXacin IVPB 750 milliGRAM(s) IV Intermittent every 48 hours    Prior/Completed Antimicrobials:  levoFLOXacin IVPB  piperacillin/tazobactam IVPB.  piperacillin/tazobactam IVPB.-  piperacillin/tazobactam IVPB.-  piperacillin/tazobactam IVPB..  piperacillin/tazobactam IVPB..  piperacillin/tazobactam IVPB...  remdesivir  IVPB  remdesivir  IVPB  remdesivir  IVPB  vancomycin  IVPB.

## 2025-03-12 NOTE — PROGRESS NOTE ADULT - SUBJECTIVE AND OBJECTIVE BOX
DATE OF SERVICE: 03-12-25 @ 14:45    Patient is a 69y old  Male who presents with a chief complaint of Acute on chronic hypoxemic respiratory failure (12 Mar 2025 12:22)      INTERVAL HISTORY:     REVIEW OF SYSTEMS:  CONSTITUTIONAL: No weakness  EYES/ENT: No visual changes;  No throat pain   NECK: No pain or stiffness  RESPIRATORY: No cough, wheezing; No shortness of breath  CARDIOVASCULAR: No chest pain or palpitations  GASTROINTESTINAL: No abdominal  pain. No nausea, vomiting, or hematemesis  GENITOURINARY: No dysuria, frequency or hematuria  NEUROLOGICAL: No stroke like symptoms  SKIN: No rashes    TELEMETRY Personally reviewed:  	  MEDICATIONS:  norepinephrine Infusion 0.05 MICROgram(s)/kG/Min IV Continuous <Continuous>        PHYSICAL EXAM:  T(C): 36.5 (03-12-25 @ 12:00), Max: 37.1 (03-11-25 @ 16:00)  HR: 83 (03-12-25 @ 14:30) (59 - 102)  BP: 145/68 (03-12-25 @ 14:30) (87/52 - 172/80)  RR: 41 (03-12-25 @ 14:30) (16 - 41)  SpO2: 100% (03-12-25 @ 14:30) (94% - 100%)  Wt(kg): --  I&O's Summary    11 Mar 2025 07:01  -  12 Mar 2025 07:00  --------------------------------------------------------  IN: 1282.5 mL / OUT: 900 mL / NET: 382.5 mL    12 Mar 2025 07:01  -  12 Mar 2025 14:45  --------------------------------------------------------  IN: 191.9 mL / OUT: 200 mL / NET: -8.1 mL          Appearance: In no distress	  HEENT:    PERRL, EOMI	  Cardiovascular:  S1 S2, No JVD  Respiratory: Lungs clear to auscultation	  Gastrointestinal:  Soft, Non-tender, + BS	  Vascularature:  No edema of LE  Psychiatric: Appropriate affect   Neuro: no acute focal deficits                               8.1    6.57  )-----------( 143      ( 12 Mar 2025 00:26 )             25.3     03-12    143  |  107  |  58[H]  ----------------------------<  121[H]  4.4   |  22  |  2.81[H]    Ca    9.7      12 Mar 2025 00:26  Phos  3.9     03-12  Mg     2.3     03-12    TPro  6.6  /  Alb  2.6[L]  /  TBili  0.4  /  DBili  x   /  AST  40  /  ALT  34  /  AlkPhos  202[H]  03-12        Labs personally reviewed      ASSESSMENT/PLAN: 	    69 year old male with a past medical history of hypertension, hyperlipemia VAZQUEZ (on CPAP at bedtime, NC 2 liters during the day), CKD stage 3, epilepsy, schizophrenia, developmental delay, metastatic testicular cancer (s/p orchiectomy, actively on chemotherapy, last dose of etoposide/cisplatin on 6/2024), presenting with acute on chronic hypoxemic respiratory failure, secondary to (a) COVID-19 infection, (b) superimposed pneumonia after recent influenza infection, and/or (c) fluid overload.     Problem/Plan - 1:  ·  Problem: Acute on chronic respiratory failure with hypoxia.   ·  Plan: Likely 2/2 PNA, HF  - Appreciate pulmonology.  - Transferred to MICU for hypoxia. Appreciate MICU care.   - 2/24 started on Bumex gtt at 2mg/hr  - 2/26 No appreciable JVD or peripheral edema. BP now soft with labile but stable Cr. Does not appear to be a HF. Now off diuretics.   --- 2/26 CT Chest shows new and worsening confluent areas of consolidation/pneumonia in the mid to lower lungs.  - CXR 3/10 with unchanged patchy opacities throughout the left lung    Problem/Plan - 2:  ·  Problem: SHAGUFTA (acute kidney injury).   ·  Plan: Has a history of CKD stage 3, Cr 2.38 at baseline. Presents with Cr 3.27.   - Nephrology following  - BUN now improved following de-escalation of diuretics    Problem/Plan - 3:  ·  Problem: Chronic heart failure with preserved ejection fraction.   ·  Plan: TTE done here 1/17/25 technically difficult, but LV systolic fxn appears nl without any regional wall motion abnormalities  - Repeat TTE pending  - BNP 5000 <---1100 but appears euvolemic   - s/p bumex gtt per ICU    Problem/Plan - 4:  ·  Problem: New onset Afib RVR (on tele, confirmed with EKG 2/19)   ·  Plan:  - Unable to tolerate rate control 2/2 hypotension  - AC on hold given thrombocytopenia and anemia    Problem/Plan - 5:  ·  Problem: HTN    ·  Plan: Still on pressors as per MICU        Magaly Laird, AG-NP   Gus Andrew DO [facility]  Cardiovascular Medicine  [address] [facility], [address]  Available through call or text on [employer] TEAM[employer]  Office: [phone]   DATE OF SERVICE: 03-12-25 @ 14:45    Patient is a 69y old  Male who presents with a chief complaint of Acute on chronic hypoxemic respiratory failure (12 Mar 2025 12:22)      INTERVAL HISTORY: Now with trach. In no acute distress.     REVIEW OF SYSTEMS: Limited participate in ROS  CONSTITUTIONAL: No weakness  EYES/ENT: No visual changes;  No throat pain   NECK: No pain or stiffness  RESPIRATORY: No cough, wheezing; No shortness of breath  CARDIOVASCULAR: No chest pain or palpitations  GASTROINTESTINAL: No abdominal  pain. No nausea, vomiting, or hematemesis  GENITOURINARY: No dysuria, frequency or hematuria  NEUROLOGICAL: No stroke like symptoms  SKIN: No rashes    TELEMETRY Personally reviewed: SR with [employer] PACs  	  MEDICATIONS:  norepinephrine Infusion 0.05 MICROgram(s)/kG/Min IV Continuous <Continuous>        PHYSICAL EXAM:  T(C): 36.5 (03-12-25 @ 12:00), Max: 37.1 (03-11-25 @ 16:00)  HR: 83 (03-12-25 @ 14:30) (59 - 102)  BP: 145/68 (03-12-25 @ 14:30) (87/52 - 172/80)  RR: 41 (03-12-25 @ 14:30) (16 - 41)  SpO2: 100% (03-12-25 @ 14:30) (94% - 100%)  Wt(kg): --  I&O's Summary    11 Mar 2025 07:01  -  12 Mar 2025 07:00  --------------------------------------------------------  IN: 1282.5 mL / OUT: 900 mL / NET: 382.5 mL    12 Mar 2025 07:01  -  12 Mar 2025 14:45  --------------------------------------------------------  IN: 191.9 mL / OUT: 200 mL / NET: -8.1 mL          Appearance: In no distress	  HEENT:    PERRL, EOMI	  Cardiovascular:  S1 S2, No JVD  Respiratory: Lungs clear to auscultation	  Gastrointestinal:  Soft, Non-tender, + BS	  Vascularature:  No edema of LE  Psychiatric: Appropriate affect   Neuro: no acute focal deficits                               8.1    6.57  )-----------( 143      ( 12 Mar 2025 00:26 )             25.3     03-12    143  |  107  |  58[H]  ----------------------------<  121[H]  4.4   |  22  |  2.81[H]    Ca    9.7      12 Mar 2025 00:26  Phos  3.9     03-12  Mg     2.3     03-12    TPro  6.6  /  Alb  2.6[L]  /  TBili  0.4  /  DBili  x   /  AST  40  /  ALT  34  /  AlkPhos  202[H]  03-12        Labs personally reviewed      ASSESSMENT/PLAN: 	    69 year old male with a past medical history of hypertension, hyperlipemia VAZQUEZ (on CPAP at bedtime, NC 2 liters during the day), CKD stage 3, epilepsy, schizophrenia, developmental delay, metastatic testicular cancer (s/p orchiectomy, actively on chemotherapy, last dose of etoposide/cisplatin on 6/2024), presenting with acute on chronic hypoxemic respiratory failure, secondary to (a) COVID-19 infection, (b) superimposed pneumonia after recent influenza infection, and/or (c) fluid overload.     Problem/Plan - 1:  ·  Problem: Acute on chronic respiratory failure with hypoxia.   ·  Plan: Likely 2/2 PNA, HF  - Appreciate pulmonology.  - Transferred to MICU for hypoxia. Appreciate MICU care.   - 2/24 started on Bumex gtt at 2mg/hr  - 2/26 No appreciable JVD or peripheral edema. BP now soft with labile but stable Cr. Does not appear to be a HF. Now off diuretics.   --- 2/26 CT Chest shows new and worsening confluent areas of consolidation/pneumonia in the mid to lower lungs.  - CXR 3/10 with unchanged patchy opacities throughout the left lung    Problem/Plan - 2:  ·  Problem: SHAGUFTA (acute kidney injury).   ·  Plan: Has a history of CKD stage 3, Cr 2.38 at baseline. Presents with Cr 3.27.   - Nephrology following  - BUN now improved following de-escalation of diuretics    Problem/Plan - 3:  ·  Problem: Chronic heart failure with preserved ejection fraction.   ·  Plan: TTE done here 1/17/25 technically difficult, but LV systolic fxn appears nl without any regional wall motion abnormalities  - Repeat TTE pending  - BNP 5000 <---1100 but appears euvolemic   - s/p bumex gtt per ICU    Problem/Plan - 4:  ·  Problem: New onset Afib RVR (on tele, confirmed with EKG 2/19)   ·  Plan:  - Unable to tolerate rate control 2/2 hypotension  - AC on hold given thrombocytopenia and anemia    Problem/Plan - 5:  ·  Problem: HTN    ·  Plan: Still on pressors as per MICU        Magaly Laird, AG-NP   Gus Andrew DO [facility]  Cardiovascular Medicine  [address] [place] [address], [address]  Available through call or text on [employer] TEAMs  Office: [phone]   DATE OF SERVICE: 03-12-25 @ 14:45    Patient is a 69y old  Male who presents with a chief complaint of Acute on chronic hypoxemic respiratory failure (12 Mar 2025 12:22)      INTERVAL HISTORY: Now with trach. In no acute distress.     REVIEW OF SYSTEMS: Limited participate in ROS  CONSTITUTIONAL: No weakness  EYES/ENT: No visual changes;  No throat pain   NECK: No pain or stiffness  RESPIRATORY: No cough, wheezing; No shortness of breath  CARDIOVASCULAR: No chest pain or palpitations  GASTROINTESTINAL: No abdominal  pain. No nausea, vomiting, or hematemesis  GENITOURINARY: No dysuria, frequency or hematuria  NEUROLOGICAL: No stroke like symptoms  SKIN: No rashes    TELEMETRY Personally reviewed: SR with [employer] PACs  	  MEDICATIONS:  norepinephrine Infusion 0.05 MICROgram(s)/kG/Min IV Continuous <Continuous>        PHYSICAL EXAM:  T(C): 36.5 (03-12-25 @ 12:00), Max: 37.1 (03-11-25 @ 16:00)  HR: 83 (03-12-25 @ 14:30) (59 - 102)  BP: 145/68 (03-12-25 @ 14:30) (87/52 - 172/80)  RR: 41 (03-12-25 @ 14:30) (16 - 41)  SpO2: 100% (03-12-25 @ 14:30) (94% - 100%)  Wt(kg): --  I&O's Summary    11 Mar 2025 07:01  -  12 Mar 2025 07:00  --------------------------------------------------------  IN: 1282.5 mL / OUT: 900 mL / NET: 382.5 mL    12 Mar 2025 07:01  -  12 Mar 2025 14:45  --------------------------------------------------------  IN: 191.9 mL / OUT: 200 mL / NET: -8.1 mL          Appearance: In no distress	  HEENT:    PERRL, EOMI	  Cardiovascular:  S1 S2, No JVD  Respiratory: Lungs clear to auscultation	  Gastrointestinal:  Soft, Non-tender, + BS	  Vascularature:  No edema of LE  Psychiatric: Appropriate affect   Neuro: no acute focal deficits                               8.1    6.57  )-----------( 143      ( 12 Mar 2025 00:26 )             25.3     03-12    143  |  107  |  58[H]  ----------------------------<  121[H]  4.4   |  22  |  2.81[H]    Ca    9.7      12 Mar 2025 00:26  Phos  3.9     03-12  Mg     2.3     03-12    TPro  6.6  /  Alb  2.6[L]  /  TBili  0.4  /  DBili  x   /  AST  40  /  ALT  34  /  AlkPhos  202[H]  03-12        Labs personally reviewed      ASSESSMENT/PLAN: 	    69 year old male with a past medical history of hypertension, hyperlipemia VAQZUEZ (on CPAP at bedtime, NC 2 liters during the day), CKD stage 3, epilepsy, schizophrenia, developmental delay, metastatic testicular cancer (s/p orchiectomy, actively on chemotherapy, last dose of etoposide/cisplatin on 6/2024), presenting with acute on chronic hypoxemic respiratory failure, secondary to (a) COVID-19 infection, (b) superimposed pneumonia after recent influenza infection, and/or (c) fluid overload.     Problem/Plan - 1:  ·  Problem: Acute on chronic respiratory failure with hypoxia.   ·  Plan: Likely 2/2 PNA, HF  - Appreciate pulmonology.  - Transferred to MICU for hypoxia. Appreciate MICU care.   - 2/24 started on Bumex gtt at 2mg/hr  - 2/26 No appreciable JVD or peripheral edema. BP now soft with labile but stable Cr. Does not appear to be a HF. Now off diuretics.   --- 2/26 CT Chest shows new and worsening confluent areas of consolidation/pneumonia in the mid to lower lungs.  - CXR 3/10 with unchanged patchy opacities throughout the left lung    Problem/Plan - 2:  ·  Problem: SHAGUFTA (acute kidney injury).   ·  Plan: Has a history of CKD stage 3, Cr 2.38 at baseline. Presents with Cr 3.27.   - Nephrology following  - BUN now improved following de-escalation of diuretics    Problem/Plan - 3:  ·  Problem: Chronic heart failure with preserved ejection fraction.   ·  Plan: TTE done here 1/17/25 technically difficult, but LV systolic fxn appears nl without any regional wall motion abnormalities  - Repeat TTE pending  - BNP 5000 <---1100 but appears euvolemic   - s/p bumex gtt per ICU    Problem/Plan - 4:  ·  Problem: New onset Afib RVR (on tele, confirmed with EKG 2/19)   ·  Plan:  - Unable to tolerate rate control 2/2 hypotension  - AC on hold given thrombocytopenia and anemia    Problem/Plan - 5:  ·  Problem: HTN    ·  Plan: Still on pressors as per MICU    Problem/Plan - 6:  ·  Problem: Cardiac Risk Stratification   ·  Plan: Patient is high risk given pressor dependent for mod risk PEG placement. No contraindication to proceed.   - Tele with stable HR SR with PACs  - Patient with preserved EF, not currently in decompensated HF  - No significant valvular dysfunction      Magaly Laird, AG-NP   Gus Andrew DO [facility]  Cardiovascular Medicine  [address] [place] [address], [address]  Available through call or text on [employer] TEAMs  Office: [phone]

## 2025-03-12 NOTE — PROGRESS NOTE ADULT - ASSESSMENT
Patient is a 69 year old male with PMH of HTN, HLD, VAZQUEZ (on CPAP at bedtime, NC 2 liters during the day), CKD3, epilepsy, schizophrenia, developmental delay, metastatic testicular cancer (s/p orchiectomy, actively on chemotherapy, last dose of etoposide/cisplatin on 6/2024) presenting with worsening shortness of breath. Patient was recently hospitalized at [facility] from January 27th 2025 to February 6th 2025 for seizure and influenza virus infection, which required intubation. He was sent to rehab (originally from home) from hospital and now returns due to sudden onset shortness of breath and worsening oxygenation. Of note, he tested positive for COVID-19 at facility on 2/13/25 and was not put on any COVID-19 treatment at the time, only guaifenesin and scopolamine patch. Patient was placed on non-rebreather and sent to the hospital on 2/16/25. Noted initial discussion with patient/family and they decided to hold off on remdesivir given LFTs and SHAGUFTA.     Acute on chronic hypoxic respiratory failure  COVID-19 pneumonia, superimposed bacterial pneumonia  Acute on chronic HFpEF  Klebsiella UTI, treated   SHAGUFTA on CKD  Severe sepsis, hypotension, SHAGUFTA, likely due to aspiration pneumonia, treated  Now with fever with copious secretions post extubation - Pseudomonas pneumonia   Pseudomonas UTI  - CT chest with extensive b/l ggo c/w COVID pneumonia; tracheomalacia   - MRSA PCR screen negative, s/p vancomycin   - 2/18 worsening resp status, requiring AVAPS, started on remdesivir   - 2/22 completed remdesivir x5d course   - 2/23 s/p RRT for inc WOB, s/p intubation, suspected aspiration   - 2/24 intubated, on sedation, pressor support, SHAGUFTA, WBC wnl   - 2/25 afebrile, off pressor, WBC wnl, Cr stable  - 2/26 CT chest with improved upper lobe ggo, new/worsening confluent areas of consolidation in mid-lower lungs   - 2/26 completed dexamethasone x10d  - 2/28 completed zosyn   - 3/4 s/p extubation, now on NC, copious secretions requiring frequent suctioning   - 3/5 started on erythromycin for conjunctivitis, culture grew Staph epi - S to erythromycin   - 3/7 febrile, WBC noted, secretions less but still a lot, worsening pulm findings, likely aspirating, started on zosyn    - s/p re-intubation and bronchoscopy with L mainstem mucous plug, thick secretions -send for culture    - 3/7 Scx noted with Pseudomonas aeruginosa -- now R to zosyn (h/o pansensitive Pseudomonas in Scx in 1/2025)   - 3/8 BAL culture with mod Pseudomonas   -  3/9 pm switched from zosyn to levofloxacin based on sensitivities   - 3/9 noted with cloudy urine, UA with pyuria -- Ucx growing Pseudomonas also (sensitivities noted)   - 3/10 WBC normalized, Hb low, temps wnl, remains intubated     Recommendations:  Continue on levofloxacin (renally dosed) to complete 7 days on 3/15  Nephrology following, monitor Cr  Monitor temps/WBC  Supportive care  Aspiration precautions  Continue rest of care per primary team       Greyson Mart M.D.  [facility]  Available on [employer] TEAMS - *PREFERRED*  [phone]  After 5pm on weekdays and all day on weekends - please call [phone]     Thank you for consulting us and involving us in the management of this patients case. In addition to reviewing history, imaging, documents, labs, microbiology, took into account antibiotic stewardship, local antibiogram and infection control strategies and potential transmission issues.

## 2025-03-12 NOTE — PROGRESS NOTE ADULT - ASSESSMENT
69 year old male with a past medical history of hypertension, hyperlipemia VAZQUEZ (on CPAP at bedtime, NC 2 liters during the day), CKD stage 3, epilepsy, schizophrenia, developmental delay, metastatic testicular cancer (s/p orchiectomy, actively on chemotherapy, last dose of etoposide/cisplatin on 6/2024) presenting with worsening shortness of breath. Patient was recently hospitalized at [facility] from January 27th 2025 to February 6th 2025 for seizure and influenza virus infection, which required intubation. He was sent to rehab (originally from home) from hospital. He returns due to sudden onset shortness of breath and worsening oxygenation. Of note, he tested positive for COVID-19       1- SHAGUFTA on CKDIII  2- covid-19  3- anemia  4- hypotension  5- respiratory failure         SHAGUFTA suspected in setting of new infection. covid 19   cr and bun  at a plateau range   bp is better   trend cr and lytes   hyperphosphatemia   renvela 1600 mg tid  anemia, trend hb add retacrit 02077 U tiw sq  for trach   d.w MICU team when seen earlier

## 2025-03-12 NOTE — PROGRESS NOTE ADULT - ASSESSMENT
70 y/o M with PMH of HTN, HLD, VAZQUEZ on CPAP and home O2 (2LNC daytime), CKD, epilepsy, schizophrenia developmental delay, metastatic testicular CA. Recent admission at [facility] for acute respiratory failure in the setting of seizures, influenza infection, pulmonary edema, PNA requiring intubation in MICU. Was eventually discharged to rehab. Presents now with SOB, found to be COVID + at facility on 2/13. Febrile on admission to ED to 101.9F. Placed on Bipap for increased WOB. Pulmonary called to consult for acute respiratory failure.

## 2025-03-13 DIAGNOSIS — F20.9 SCHIZOPHRENIA, UNSPECIFIED: ICD-10-CM

## 2025-03-13 DIAGNOSIS — C62.90 MALIGNANT NEOPLASM OF UNSPECIFIED TESTIS, UNSPECIFIED WHETHER DESCENDED OR UNDESCENDED: ICD-10-CM

## 2025-03-13 DIAGNOSIS — C79.31 SECONDARY MALIGNANT NEOPLASM OF BRAIN: ICD-10-CM

## 2025-03-13 DIAGNOSIS — I48.0 PAROXYSMAL ATRIAL FIBRILLATION: ICD-10-CM

## 2025-03-13 DIAGNOSIS — Z71.89 OTHER SPECIFIED COUNSELING: ICD-10-CM

## 2025-03-13 DIAGNOSIS — R13.10 DYSPHAGIA, UNSPECIFIED: ICD-10-CM

## 2025-03-13 LAB
ALBUMIN SERPL ELPH-MCNC: 2.7 G/DL — LOW (ref 3.3–5)
ALP SERPL-CCNC: 228 U/L — HIGH (ref 40–120)
ALT FLD-CCNC: 38 U/L — SIGNIFICANT CHANGE UP (ref 10–45)
ANION GAP SERPL CALC-SCNC: 15 MMOL/L — SIGNIFICANT CHANGE UP (ref 5–17)
APTT BLD: 22.9 SEC — LOW (ref 24.5–35.6)
AST SERPL-CCNC: 46 U/L — HIGH (ref 10–40)
BASOPHILS # BLD AUTO: 0.05 K/UL — SIGNIFICANT CHANGE UP (ref 0–0.2)
BASOPHILS NFR BLD AUTO: 0.6 % — SIGNIFICANT CHANGE UP (ref 0–2)
BILIRUB SERPL-MCNC: 0.4 MG/DL — SIGNIFICANT CHANGE UP (ref 0.2–1.2)
BUN SERPL-MCNC: 58 MG/DL — HIGH (ref 7–23)
CALCIUM SERPL-MCNC: 9.6 MG/DL — SIGNIFICANT CHANGE UP (ref 8.4–10.5)
CHLORIDE SERPL-SCNC: 105 MMOL/L — SIGNIFICANT CHANGE UP (ref 96–108)
CO2 SERPL-SCNC: 21 MMOL/L — LOW (ref 22–31)
CREAT SERPL-MCNC: 3.06 MG/DL — HIGH (ref 0.5–1.3)
CULTURE RESULTS: SIGNIFICANT CHANGE UP
CULTURE RESULTS: SIGNIFICANT CHANGE UP
EGFR: 22 ML/MIN/1.73M2 — LOW
EGFR: 22 ML/MIN/1.73M2 — LOW
EOSINOPHIL # BLD AUTO: 0.37 K/UL — SIGNIFICANT CHANGE UP (ref 0–0.5)
EOSINOPHIL NFR BLD AUTO: 4.8 % — SIGNIFICANT CHANGE UP (ref 0–6)
GAS PNL BLDA: SIGNIFICANT CHANGE UP
GLUCOSE BLDC GLUCOMTR-MCNC: 116 MG/DL — HIGH (ref 70–99)
GLUCOSE BLDC GLUCOMTR-MCNC: 125 MG/DL — HIGH (ref 70–99)
GLUCOSE SERPL-MCNC: 107 MG/DL — HIGH (ref 70–99)
HCT VFR BLD CALC: 24.8 % — LOW (ref 39–50)
HGB BLD-MCNC: 7.9 G/DL — LOW (ref 13–17)
IMM GRANULOCYTES NFR BLD AUTO: 5.9 % — HIGH (ref 0–0.9)
INR BLD: 1.09 RATIO — SIGNIFICANT CHANGE UP (ref 0.85–1.16)
LYMPHOCYTES # BLD AUTO: 0.89 K/UL — LOW (ref 1–3.3)
LYMPHOCYTES # BLD AUTO: 11.4 % — LOW (ref 13–44)
MAGNESIUM SERPL-MCNC: 2.3 MG/DL — SIGNIFICANT CHANGE UP (ref 1.6–2.6)
MCHC RBC-ENTMCNC: 31.2 PG — SIGNIFICANT CHANGE UP (ref 27–34)
MCHC RBC-ENTMCNC: 31.9 G/DL — LOW (ref 32–36)
MCV RBC AUTO: 98 FL — SIGNIFICANT CHANGE UP (ref 80–100)
MONOCYTES # BLD AUTO: 0.67 K/UL — SIGNIFICANT CHANGE UP (ref 0–0.9)
MONOCYTES NFR BLD AUTO: 8.6 % — SIGNIFICANT CHANGE UP (ref 2–14)
NEUTROPHILS # BLD AUTO: 5.34 K/UL — SIGNIFICANT CHANGE UP (ref 1.8–7.4)
NEUTROPHILS NFR BLD AUTO: 68.7 % — SIGNIFICANT CHANGE UP (ref 43–77)
NRBC BLD AUTO-RTO: 0 /100 WBCS — SIGNIFICANT CHANGE UP (ref 0–0)
PHOSPHATE SERPL-MCNC: 4.4 MG/DL — SIGNIFICANT CHANGE UP (ref 2.5–4.5)
PLATELET # BLD AUTO: 130 K/UL — LOW (ref 150–400)
POTASSIUM SERPL-MCNC: 4.3 MMOL/L — SIGNIFICANT CHANGE UP (ref 3.5–5.3)
POTASSIUM SERPL-SCNC: 4.3 MMOL/L — SIGNIFICANT CHANGE UP (ref 3.5–5.3)
PROT SERPL-MCNC: 6.3 G/DL — SIGNIFICANT CHANGE UP (ref 6–8.3)
PROTHROM AB SERPL-ACNC: 12.4 SEC — SIGNIFICANT CHANGE UP (ref 9.9–13.4)
RBC # BLD: 2.53 M/UL — LOW (ref 4.2–5.8)
RBC # FLD: 17.1 % — HIGH (ref 10.3–14.5)
SODIUM SERPL-SCNC: 141 MMOL/L — SIGNIFICANT CHANGE UP (ref 135–145)
SPECIMEN SOURCE: SIGNIFICANT CHANGE UP
SPECIMEN SOURCE: SIGNIFICANT CHANGE UP
WBC # BLD: 7.78 K/UL — SIGNIFICANT CHANGE UP (ref 3.8–10.5)
WBC # FLD AUTO: 7.78 K/UL — SIGNIFICANT CHANGE UP (ref 3.8–10.5)

## 2025-03-13 PROCEDURE — 99291 CRITICAL CARE FIRST HOUR: CPT

## 2025-03-13 PROCEDURE — 93970 EXTREMITY STUDY: CPT | Mod: 26

## 2025-03-13 RX ORDER — MAGNESIUM, ALUMINUM HYDROXIDE 200-200 MG
30 TABLET,CHEWABLE ORAL EVERY 4 HOURS
Refills: 0 | Status: DISCONTINUED | OUTPATIENT
Start: 2025-03-13 | End: 2025-03-16

## 2025-03-13 RX ORDER — ONDANSETRON HCL/PF 4 MG/2 ML
4 VIAL (ML) INJECTION EVERY 8 HOURS
Refills: 0 | Status: DISCONTINUED | OUTPATIENT
Start: 2025-03-13 | End: 2025-03-28

## 2025-03-13 RX ORDER — ACETAMINOPHEN 500 MG/5ML
1000 LIQUID (ML) ORAL ONCE
Refills: 0 | Status: COMPLETED | OUTPATIENT
Start: 2025-03-13 | End: 2025-03-13

## 2025-03-13 RX ORDER — NYSTATIN 100000 [USP'U]/G
1 CREAM TOPICAL EVERY 8 HOURS
Refills: 0 | Status: DISCONTINUED | OUTPATIENT
Start: 2025-03-13 | End: 2025-03-28

## 2025-03-13 RX ORDER — ACETAMINOPHEN 500 MG/5ML
650 LIQUID (ML) ORAL EVERY 6 HOURS
Refills: 0 | Status: DISCONTINUED | OUTPATIENT
Start: 2025-03-13 | End: 2025-03-16

## 2025-03-13 RX ORDER — HYDROMORPHONE/SOD CHLOR,ISO/PF 2 MG/10 ML
0.5 SYRINGE (ML) INJECTION EVERY 4 HOURS
Refills: 0 | Status: DISCONTINUED | OUTPATIENT
Start: 2025-03-13 | End: 2025-03-13

## 2025-03-13 RX ORDER — LEVETIRACETAM 10 MG/ML
750 INJECTION, SOLUTION INTRAVENOUS EVERY 12 HOURS
Refills: 0 | Status: DISCONTINUED | OUTPATIENT
Start: 2025-03-13 | End: 2025-03-21

## 2025-03-13 RX ORDER — MELATONIN 5 MG
3 TABLET ORAL AT BEDTIME
Refills: 0 | Status: DISCONTINUED | OUTPATIENT
Start: 2025-03-13 | End: 2025-03-28

## 2025-03-13 RX ADMIN — Medication 400 MILLIGRAM(S): at 18:50

## 2025-03-13 RX ADMIN — Medication 10 MILLILITER(S): at 22:42

## 2025-03-13 RX ADMIN — LAMOTRIGINE 50 MILLIGRAM(S): 150 TABLET ORAL at 05:58

## 2025-03-13 RX ADMIN — LACOSAMIDE 140 MILLIGRAM(S): 150 TABLET, FILM COATED ORAL at 05:57

## 2025-03-13 RX ADMIN — LAMOTRIGINE 50 MILLIGRAM(S): 150 TABLET ORAL at 18:15

## 2025-03-13 RX ADMIN — SEVELAMER HYDROCHLORIDE 1600 MILLIGRAM(S): 800 TABLET ORAL at 22:42

## 2025-03-13 RX ADMIN — IPRATROPIUM BROMIDE AND ALBUTEROL SULFATE 3 MILLILITER(S): .5; 2.5 SOLUTION RESPIRATORY (INHALATION) at 23:28

## 2025-03-13 RX ADMIN — IPRATROPIUM BROMIDE AND ALBUTEROL SULFATE 3 MILLILITER(S): .5; 2.5 SOLUTION RESPIRATORY (INHALATION) at 17:10

## 2025-03-13 RX ADMIN — Medication 15 MILLILITER(S): at 05:56

## 2025-03-13 RX ADMIN — IPRATROPIUM BROMIDE AND ALBUTEROL SULFATE 3 MILLILITER(S): .5; 2.5 SOLUTION RESPIRATORY (INHALATION) at 11:59

## 2025-03-13 RX ADMIN — Medication 15 MILLILITER(S): at 18:20

## 2025-03-13 RX ADMIN — NYSTATIN 1 APPLICATION(S): 100000 CREAM TOPICAL at 05:56

## 2025-03-13 RX ADMIN — ATORVASTATIN CALCIUM 20 MILLIGRAM(S): 80 TABLET, FILM COATED ORAL at 22:43

## 2025-03-13 RX ADMIN — IPRATROPIUM BROMIDE AND ALBUTEROL SULFATE 3 MILLILITER(S): .5; 2.5 SOLUTION RESPIRATORY (INHALATION) at 05:17

## 2025-03-13 RX ADMIN — LEVETIRACETAM 400 MILLIGRAM(S): 10 INJECTION, SOLUTION INTRAVENOUS at 05:57

## 2025-03-13 RX ADMIN — PROPOFOL 26.7 MICROGRAM(S)/KG/MIN: 10 INJECTION, EMULSION INTRAVENOUS at 00:44

## 2025-03-13 RX ADMIN — Medication 1000 MILLIGRAM(S): at 20:00

## 2025-03-13 RX ADMIN — POLYETHYLENE GLYCOL 3350 17 GRAM(S): 17 POWDER, FOR SOLUTION ORAL at 12:44

## 2025-03-13 RX ADMIN — LURASIDONE HYDROCHLORIDE 20 MILLIGRAM(S): 120 TABLET, FILM COATED ORAL at 22:42

## 2025-03-13 RX ADMIN — LACOSAMIDE 140 MILLIGRAM(S): 150 TABLET, FILM COATED ORAL at 18:07

## 2025-03-13 RX ADMIN — EPOETIN ALFA 10000 UNIT(S): 10000 SOLUTION INTRAVENOUS; SUBCUTANEOUS at 08:04

## 2025-03-13 RX ADMIN — LEVETIRACETAM 400 MILLIGRAM(S): 10 INJECTION, SOLUTION INTRAVENOUS at 19:35

## 2025-03-13 RX ADMIN — NYSTATIN 1 APPLICATION(S): 100000 CREAM TOPICAL at 22:42

## 2025-03-13 RX ADMIN — SEVELAMER HYDROCHLORIDE 1600 MILLIGRAM(S): 800 TABLET ORAL at 05:58

## 2025-03-13 RX ADMIN — Medication 1000 UNIT(S): at 12:44

## 2025-03-13 RX ADMIN — Medication 40 MILLIGRAM(S): at 12:43

## 2025-03-13 RX ADMIN — SEVELAMER HYDROCHLORIDE 1600 MILLIGRAM(S): 800 TABLET ORAL at 13:44

## 2025-03-13 RX ADMIN — GABAPENTIN 150 MILLIGRAM(S): 400 CAPSULE ORAL at 05:57

## 2025-03-13 RX ADMIN — Medication 1 APPLICATION(S): at 12:53

## 2025-03-13 RX ADMIN — GABAPENTIN 150 MILLIGRAM(S): 400 CAPSULE ORAL at 18:07

## 2025-03-13 NOTE — PROGRESS NOTE ADULT - PROBLEM SELECTOR PLAN 5
New onset Afib with RVR, EKG 2/19  - Heparin drip stopped to H/H instability  - patient is at increased risk of bleed.   - Will perform OPHELIA to clear LYNN. If clear, will hold AC moving forward   - restart BB if rates remain uncontrolled

## 2025-03-13 NOTE — PROGRESS NOTE ADULT - SUBJECTIVE AND OBJECTIVE BOX
INTERVAL HPI/OVERNIGHT EVENTS:  - s/p trach placement yesterday, CXR confirming placement  - propofol switched to precedex  - stopped free water    SUBJECTIVE: Patient seen and examined at bedside. Remains intubated and sedated. Place for PEG today if family agreeable.       VITAL SIGNS:  ICU Vital Signs Last 24 Hrs  T(C): 36.3 (13 Mar 2025 04:00), Max: 36.9 (12 Mar 2025 16:00)  T(F): 97.3 (13 Mar 2025 04:00), Max: 98.5 (12 Mar 2025 16:00)  HR: 69 (13 Mar 2025 07:00) (62 - 93)  BP: 98/50 (13 Mar 2025 07:00) (81/41 - 172/80)  BP(mean): 72 (13 Mar 2025 07:00) (58 - 115)  ABP: --  ABP(mean): --  RR: 18 (13 Mar 2025 07:00) (16 - 41)  SpO2: 97% (13 Mar 2025 07:00) (93% - 100%)    O2 Parameters below as of 13 Mar 2025 05:32  Patient On (Oxygen Delivery Method): ventilator          Mode: AC/ CMV (Assist Control/ Continuous Mandatory Ventilation), RR (machine): 16, TV (machine): 450, FiO2: 30, PEEP: 6, ITime: 1, MAP: 10, PIP: 27  Plateau pressure:   P/F ratio:     03-12 @ 07:01  -  03-13 @ 07:00  --------------------------------------------------------  IN: 1076.5 mL / OUT: 830 mL / NET: 246.5 mL      CAPILLARY BLOOD GLUCOSE      POCT Blood Glucose.: 116 mg/dL (13 Mar 2025 06:46)    ECG:    PHYSICAL EXAM:    General: intubated, sedated  HEENT: atraumatic, normocephalic. EOMI  Neck: supple, no JVD. Trach note, no oozing or pus noted on dressing   Respiratory: CTAB, no increased work of breathing  Cardiovascular: RRR  Abdomen: soft, NT, ND  Extremities: 2+ peripheral pulses b/l  Neurological: sedated    MEDICATIONS:  MEDICATIONS  (STANDING):  albuterol/ipratropium for Nebulization 3 milliLiter(s) Nebulizer every 6 hours  atorvastatin 20 milliGRAM(s) Oral at bedtime  chlorhexidine 0.12% Liquid 15 milliLiter(s) Oral Mucosa every 12 hours  cholecalciferol 1000 Unit(s) Oral daily  dexMEDEtomidine Infusion 0.2 MICROgram(s)/kG/Hr (4.46 mL/Hr) IV Continuous <Continuous>  epoetin krystyna-epbx (RETACRIT) Injectable 91036 Unit(s) SubCutaneous <User Schedule>  escitalopram 15 milliGRAM(s) Oral with breakfast  gabapentin Solution 150 milliGRAM(s) Oral every 12 hours  influenza  Vaccine (HIGH DOSE) 0.5 milliLiter(s) IntraMuscular once  lacosamide IVPB 200 milliGRAM(s) IV Intermittent every 12 hours  lamoTRIgine 50 milliGRAM(s) Oral two times a day  lanolin Ointment 1 Application(s) Topical daily  levETIRAcetam  IVPB 750 milliGRAM(s) IV Intermittent every 12 hours  levoFLOXacin IVPB      levoFLOXacin IVPB 750 milliGRAM(s) IV Intermittent every 48 hours  lurasidone 20 milliGRAM(s) Oral at bedtime  nystatin Powder 1 Application(s) Topical two times a day  pantoprazole  Injectable 40 milliGRAM(s) IV Push daily  polyethylene glycol 3350 17 Gram(s) Oral daily  propofol Infusion 50 MICROgram(s)/kG/Min (26.7 mL/Hr) IV Continuous <Continuous>  senna Syrup 10 milliLiter(s) Oral at bedtime  sevelamer carbonate Powder 1600 milliGRAM(s) Oral every 8 hours    MEDICATIONS  (PRN):  artificial tears (preservative free) Ophthalmic Solution 1 Drop(s) Both EYES every 6 hours PRN Dry Eyes      ALLERGIES:  Allergies    seasonal allergies (Unknown)  No Known Drug Allergies    Intolerances    latex (Rash)      LABS:                        7.9    7.78  )-----------( 130      ( 13 Mar 2025 00:16 )             24.8     03-13    141  |  105  |  58[H]  ----------------------------<  107[H]  4.3   |  21[L]  |  3.06[H]    Ca    9.6      13 Mar 2025 00:16  Phos  4.4     03-13  Mg     2.3     03-13    TPro  6.3  /  Alb  2.7[L]  /  TBili  0.4  /  DBili  x   /  AST  46[H]  /  ALT  38  /  AlkPhos  228[H]  03-13    PT/INR - ( 13 Mar 2025 00:16 )   PT: 12.4 sec;   INR: 1.09 ratio         PTT - ( 13 Mar 2025 00:16 )  PTT:22.9 sec  Urinalysis Basic - ( 13 Mar 2025 00:16 )    Color: x / Appearance: x / SG: x / pH: x  Gluc: 107 mg/dL / Ketone: x  / Bili: x / Urobili: x   Blood: x / Protein: x / Nitrite: x   Leuk Esterase: x / RBC: x / WBC x   Sq Epi: x / Non Sq Epi: x / Bacteria: x        RADIOLOGY & ADDITIONAL TESTS: Reviewed.

## 2025-03-13 NOTE — PROGRESS NOTE ADULT - ASSESSMENT
69 year old male with a past medical history of hypertension, hyperlipemia VAZQUEZ (on CPAP at bedtime, NC 2 liters during the day), CKD stage 3, epilepsy, schizophrenia, developmental delay, metastatic testicular cancer (s/p orchiectomy, actively on chemotherapy, last dose of etoposide/cisplatin on 6/2024), presenting with acute on chronic hypoxemic respiratory failure, secondary to (a) COVID-19 infection, (b) superimposed pneumonia after recent influenza infection, and/or (c) fluid overload. MICU consulted and accepted after RRT due to increased work of breathing.    NEURO:  #Mental status:  - hx of developmental delay  - Worsening mental status 3/8, likely iso respiratory distress  - Re-intubated 3/8, sedated with fent and prop    #Epilepsy:  - Continue with home meds which include Lacosamide, Lurasidone, Lamotrigine - increased to 50 BID  - EEG negative    #Brain Mets:  - MRI brain now with 5.4 mm enhancing lesion in the LEFT middle cerebellar peduncle with associated edema suspicious for metastases    # Schizophrenia  - Lurasidone halved to reduce oversedation    CV:  #Afib w/RVR  - New onset Afib RVR (on tele, confirmed with EKG 2/19)   - off Heparin drip iso fluctuating H/H  - restart BB if rates remain uncontrolled    #AHRF  - Likely 2/2 PNA, does not appear to CHF decompensation  - worsening secretions and consolidations on 3/8, iso fever/leukocytosis may be new PNA. Management as below  - Now s/p trach 3/12    RENAL:  #SHAGUFTA on CKD3  - Hx of CKD stage 3, Cr 2.38 at baseline, peak at 4.24  - appreciate nephro input   - continue Renvela and retacrit    GI:  #PEG eval  - tentatively plan for 3/13 if family agreeable     #Diet: Tube feeds via OG tube  #Bowel Regimen  - On Senna and Miralax    ENDO:  No acute issues  Thyroid nodule noted in prior notes    HEMATOLOGIC:  #Anemia  - CKD vs. cancer vs. sepsis  - 1 unit PRBCs given  - CTM CBC  - No sign of bleeding  - retacrit 10,000 tiw sq    #DVT prophylaxis   - heparin gtt held iso H/H drop     ID:  #Sepsis  #PNA  Pseudomonas in sputum, zosyn resistant. Also Pseudomonas in Ucx  - levaquin renally dosed 750 q48h (3/9-3/16) to complete 7d course      SKIN:  #Lines:  2x PIV    Ethics:  - Full Code  - Contact: Sister Ester [phone]  69 year old male with a past medical history of hypertension, hyperlipemia VAZQUEZ (on CPAP at bedtime, NC 2 liters during the day), CKD stage 3, epilepsy, schizophrenia, developmental delay, metastatic testicular cancer (s/p orchiectomy, actively on chemotherapy, last dose of etoposide/cisplatin on 6/2024), presenting with acute on chronic hypoxemic respiratory failure, secondary to (a) COVID-19 infection, (b) superimposed pneumonia after recent influenza infection, and/or (c) fluid overload. MICU consulted and accepted after RRT due to increased work of breathing. Now s/p trach on 3/13.     NEURO:  #Mental status:  - hx of developmental delay  - Worsening mental status 3/8, likely iso respiratory distress  - Re-intubated 3/8, sedated with fent and prop    #Epilepsy:  - Continue with home meds which include Lacosamide, Lurasidone, Lamotrigine - increased to 50 BID  - EEG negative    #Brain Mets:  - MRI brain now with 5.4 mm enhancing lesion in the LEFT middle cerebellar peduncle with associated edema suspicious for metastases    # Schizophrenia  - Lurasidone halved to reduce oversedation    CV:  #Afib w/RVR  - New onset Afib RVR (on tele, confirmed with EKG 2/19)  - CHADSVASC 2   - off Heparin drip iso fluctuating H/H. given drop in H/H, patient is at increased risk of bleed. Will perform OPHELIA to clear LYNN. If clear, will hold AC moving forward   - restart BB if rates remain uncontrolled    #AHRF  - Likely 2/2 PNA, does not appear to CHF decompensation  - worsening secretions and consolidations on 3/8, iso fever/leukocytosis may be new PNA. Management as below  - Now s/p trach 3/12    RENAL:  #SHAGUFTA on CKD3  - Hx of CKD stage 3, Cr 2.38 at baseline, peak at 4.24  - appreciate nephro input   - continue Renvela and retacrit    GI:  #PEG eval  - plan for PEG once more hemodynamically stable and persistently off pressors     #Diet: Tube feeds via OG tube  #Bowel Regimen  - On Senna and Miralax    ENDO:  No acute issues  Thyroid nodule noted in prior notes    HEMATOLOGIC:  #Anemia  - CKD vs. cancer vs. sepsis  - 1 unit PRBCs given  - CTM CBC  - No sign of bleeding  - retacrit 10,000 tiw sq    #DVT prophylaxis   - heparin gtt held iso H/H drop     ID:  #Sepsis  #PNA  Pseudomonas in sputum, zosyn resistant. Also Pseudomonas in Ucx  - levaquin renally dosed 750 q48h (3/9-3/16) to complete 7d course      SKIN:  #Lines:  2x PIV    Ethics:  - Full Code  - Contact: Sister Ester [phone]

## 2025-03-13 NOTE — PROGRESS NOTE ADULT - SUBJECTIVE AND OBJECTIVE BOX
DATE OF SERVICE: 03-13-25 @ 18:14    Patient is a 69y old  Male who presents with a chief complaint of Acute on chronic hypoxemic respiratory failure (13 Mar 2025 15:06)      INTERVAL HISTORY: Alert now transferred to RCU.    REVIEW OF SYSTEMS:  CONSTITUTIONAL: No weakness  EYES/ENT: No visual changes;  No throat pain   NECK: No pain or stiffness  RESPIRATORY: No cough, wheezing; No shortness of breath  CARDIOVASCULAR: No chest pain or palpitations  GASTROINTESTINAL: No abdominal  pain. No nausea, vomiting, or hematemesis  GENITOURINARY: No dysuria, frequency or hematuria  NEUROLOGICAL: No stroke like symptoms  SKIN: No rashes    	  MEDICATIONS:        PHYSICAL EXAM:  T(C): 36.8 (03-13-25 @ 12:00), Max: 36.8 (03-13-25 @ 00:00)  HR: 82 (03-13-25 @ 17:28) (65 - 84)  BP: 119/57 (03-13-25 @ 13:00) (81/41 - 123/58)  RR: 22 (03-13-25 @ 13:00) (16 - 22)  SpO2: 97% (03-13-25 @ 17:28) (93% - 99%)  Wt(kg): --  I&O's Summary    12 Mar 2025 07:01  -  13 Mar 2025 07:00  --------------------------------------------------------  IN: 1076.5 mL / OUT: 830 mL / NET: 246.5 mL    13 Mar 2025 07:01  -  13 Mar 2025 18:14  --------------------------------------------------------  IN: 26.8 mL / OUT: 300 mL / NET: -273.2 mL          Appearance: In no distress	  HEENT:    PERRL, EOMI	  Cardiovascular:  S1 S2, No JVD  Respiratory: Lungs clear to auscultation	  Gastrointestinal:  Soft, Non-tender, + BS	  Vascularature:  No edema of LE  Psychiatric: Appropriate affect   Neuro: no acute focal deficits                               7.9    7.78  )-----------( 130      ( 13 Mar 2025 00:16 )             24.8     03-13    141  |  105  |  58[H]  ----------------------------<  107[H]  4.3   |  21[L]  |  3.06[H]    Ca    9.6      13 Mar 2025 00:16  Phos  4.4     03-13  Mg     2.3     03-13    TPro  6.3  /  Alb  2.7[L]  /  TBili  0.4  /  DBili  x   /  AST  46[H]  /  ALT  38  /  AlkPhos  228[H]  03-13        Labs personally reviewed      ASSESSMENT/PLAN: 	  69 year old male with a past medical history of hypertension, hyperlipemia VAZQUEZ (on CPAP at bedtime, NC 2 liters during the day), CKD stage 3, epilepsy, schizophrenia, developmental delay, metastatic testicular cancer (s/p orchiectomy, actively on chemotherapy, last dose of etoposide/cisplatin on 6/2024), presenting with acute on chronic hypoxemic respiratory failure, secondary to (a) COVID-19 infection, (b) superimposed pneumonia after recent influenza infection, and/or (c) fluid overload.     Problem/Plan - 1:  ·  Problem: Acute on chronic respiratory failure with hypoxia.   ·  Plan: Likely 2/2 PNA, HF  - Appreciate pulmonology.  - Transferred to MICU for hypoxia. Appreciate MICU care.   - 2/24 started on Bumex gtt at 2mg/hr  - 2/26 No appreciable JVD or peripheral edema. BP now soft with labile but stable Cr. Does not appear to be a HF. Now off diuretics.   --- 2/26 CT Chest shows new and worsening confluent areas of consolidation/pneumonia in the mid to lower lungs.  - CXR 3/10 with unchanged patchy opacities throughout the left lung    Problem/Plan - 2:  ·  Problem: SHAGUFTA (acute kidney injury).   ·  Plan: Has a history of CKD stage 3, Cr 2.38 at baseline. Presents with Cr 3.27.   - Nephrology following  - BUN now improved following de-escalation of diuretics    Problem/Plan - 3:  ·  Problem: Chronic heart failure with preserved ejection fraction.   ·  Plan: TTE done here 1/17/25 technically difficult, but LV systolic fxn appears nl without any regional wall motion abnormalities  - Repeat TTE pending  - BNP 5000 <---1100 but appears euvolemic   - s/p bumex gtt per ICU    Problem/Plan - 4:  ·  Problem: New onset Afib RVR (on tele, confirmed with EKG 2/19)   ·  Plan:  - Unable to tolerate rate control 2/2 hypotension  - AC on hold given thrombocytopenia and anemia    Problem/Plan - 5:  ·  Problem: HTN    ·  Plan: Still on pressors as per MICU    Problem/Plan - 6:  ·  Problem: Cardiac Risk Stratification   ·  Plan: Patient is mod risk for mod risk PEG placement. No contraindication to proceed.   - Tele with stable HR SR with PACs  - Patient with preserved EF, not currently in decompensated HF  - No significant valvular dysfunction            Magaly Laird, AG-NP   Gus Andrew DO [facility]  Cardiovascular Medicine  [address] [place] [address], [address]  Available through call or text on [employer] TE[employer]  Office: [phone]   DATE OF SERVICE: 03-13-25 @ 18:14    Patient is a 69y old  Male who presents with a chief complaint of Acute on chronic hypoxemic respiratory failure (13 Mar 2025 15:06)      INTERVAL HISTORY: Alert now transferred to RCU.    REVIEW OF SYSTEMS:  CONSTITUTIONAL: No weakness  EYES/ENT: No visual changes;  No throat pain   NECK: No pain or stiffness  RESPIRATORY: No cough, wheezing; No shortness of breath  CARDIOVASCULAR: No chest pain or palpitations  GASTROINTESTINAL: No abdominal  pain. No nausea, vomiting, or hematemesis  GENITOURINARY: No dysuria, frequency or hematuria  NEUROLOGICAL: No stroke like symptoms  SKIN: No rashes    	  MEDICATIONS:        PHYSICAL EXAM:  T(C): 36.8 (03-13-25 @ 12:00), Max: 36.8 (03-13-25 @ 00:00)  HR: 82 (03-13-25 @ 17:28) (65 - 84)  BP: 119/57 (03-13-25 @ 13:00) (81/41 - 123/58)  RR: 22 (03-13-25 @ 13:00) (16 - 22)  SpO2: 97% (03-13-25 @ 17:28) (93% - 99%)  Wt(kg): --  I&O's Summary    12 Mar 2025 07:01  -  13 Mar 2025 07:00  --------------------------------------------------------  IN: 1076.5 mL / OUT: 830 mL / NET: 246.5 mL    13 Mar 2025 07:01  -  13 Mar 2025 18:14  --------------------------------------------------------  IN: 26.8 mL / OUT: 300 mL / NET: -273.2 mL          Appearance: In no distress	  HEENT:    PERRL, EOMI	  Cardiovascular:  S1 S2, No JVD  Respiratory: Lungs clear to auscultation	  Gastrointestinal:  Soft, Non-tender, + BS	  Vascularature:  No edema of LE  Psychiatric: Appropriate affect   Neuro: no acute focal deficits                               7.9    7.78  )-----------( 130      ( 13 Mar 2025 00:16 )             24.8     03-13    141  |  105  |  58[H]  ----------------------------<  107[H]  4.3   |  21[L]  |  3.06[H]    Ca    9.6      13 Mar 2025 00:16  Phos  4.4     03-13  Mg     2.3     03-13    TPro  6.3  /  Alb  2.7[L]  /  TBili  0.4  /  DBili  x   /  AST  46[H]  /  ALT  38  /  AlkPhos  228[H]  03-13        Labs personally reviewed      ASSESSMENT/PLAN: 	  ASSESSMENT/PLAN: 	     69 year old male with a past medical history of hypertension, hyperlipemia VAZQUEZ (on CPAP at bedtime, NC 2 liters during the day), CKD stage 3, epilepsy, schizophrenia, developmental delay, metastatic testicular cancer (s/p orchiectomy, actively on chemotherapy, last dose of etoposide/cisplatin on 6/2024), presenting with acute on chronic hypoxemic respiratory failure, secondary to (a) COVID-19 infection, (b) superimposed pneumonia after recent influenza infection, and/or (c) fluid overload.     Problem/Plan - 1:  ·  Problem: Acute on chronic respiratory failure with hypoxia.   ·  Plan: Likely 2/2 PNA, HF  - Appreciate pulmonology.  - Transferred to RCU    Problem/Plan - 2:  ·  Problem: SHAGUFTA (acute kidney injury).   ·  Plan: Has a history of CKD stage 3, Cr 2.38 at baseline.    - Nephrology following  - BUN now improved following de-escalation of diuretics    Problem/Plan - 3:  ·  Problem: Chronic heart failure with preserved ejection fraction.   ·  Plan: TTE done here 1/17/25 technically difficult, but LV systolic fxn appears nl without any regional wall motion abnormalities  - Euvolemic, repeat BNP. On 2/23 2300    Problem/Plan - 4:  ·  Problem: New onset Afib RVR (on tele, confirmed with EKG 2/19)   ·  Plan:  - Rec Metoprolol 25mg BID  - Rec Eliquis 5mg BID      Problem/Plan - 5:  ·  Problem: HTN    ·  Plan: Resolved    Problem/Plan - 6:  ·  Problem: Cardiac Risk Stratification   ·  Plan: Patient is high risk given pressor dependent for mod risk PEG placement. No contraindication to proceed.   - Tele with stable HR SR with PACs  - Patient with preserved EF, not currently in decompensated HF  - No significant valvular dysfunction          REILLY August-DARIAN Andrew DO [facility]  Cardiovascular Medicine  [address] [place] [address], [address]  Available through call or text on [employer] TEAM[employer]  Office: [phone]

## 2025-03-13 NOTE — PROGRESS NOTE ADULT - ASSESSMENT
69 year old male with a past medical history of hypertension, hyperlipemia VAZQUEZ (on CPAP at bedtime, NC 2 liters during the day), CKD stage 3, epilepsy, schizophrenia, developmental delay, metastatic testicular cancer (s/p orchiectomy, actively on chemotherapy, last dose of etoposide/cisplatin on 6/2024), presenting with acute on chronic hypoxemic respiratory failure, secondary to (a) COVID-19 infection, (b) superimposed pneumonia after recent influenza infection,  Transferred to MICU on 2/23 after RRT on floors due to hypoxia even with max setting AVAPS. Patient started on midodrine and weaned off of levo on 2/24. Failed pressure support on 2/25 and started diuresis with Bumex. CTH was unremarkable and weaned off of precedex on 3/1. On 3/4 patient extubated and C conversations with family revealed that they would want patient to be trached. On 3/7, patient found to have RLL consolidation on POCUS and started on Zosyn, intubated on 3/8 due to increased lethargy and inability to clear oral secretions. Trach placed on 3/12 AM and patient made NPO for PEG. Transferred to RCU on 3/13. 69 year old male with a past medical history of hypertension, hyperlipemia VAZQUEZ (on CPAP at bedtime, NC 2 liters during the day), CKD stage 3, epilepsy, schizophrenia, developmental delay, metastatic testicular cancer (s/p orchiectomy, actively on chemotherapy, last dose of etoposide/cisplatin on 6/2024), presenting with acute on chronic hypoxemic respiratory failure, secondary to (a) COVID-19 infection, (b) superimposed pneumonia after recent influenza infection,  Transferred to MICU on 2/23 after RRT on floors due to hypoxia even with max setting AVAPS. Patient started on midodrine and weaned off of levo on 2/24. Failed pressure support on 2/25 and started diuresis with Bumex. CTH was unremarkable and weaned off of Precedex on 3/1. On 3/4 patient extubated and GOC conversations with family revealed that they would want patient to be trached. On 3/7, patient found to have RLL consolidation on POCUS and started on Zosyn, intubated on 3/8 due to increased lethargy and inability to clear oral secretions. Trach placed on 3/12 AM and patient made NPO for PEG. Transferred to RCU on 3/13.      []PEG evaluation when stable. Tentatively planned for 3/14  []Restart BB if pt goes into Afib again  []Levaquin to finish on 3/16 (7 day course)  []shared decision making for decision to start AC for new onset AF this admission. Of note, patient with drop in H/H once heparin gtt was started so he is high bleeding  69 year old male with a past medical history of hypertension, hyperlipemia VAZQUEZ (on CPAP at bedtime, NC 2 liters during the day), CKD stage 3, epilepsy, schizophrenia, developmental delay, metastatic testicular cancer (s/p orchiectomy, actively on chemotherapy, last dose of etoposide/cisplatin on 6/2024), presenting with acute on chronic hypoxemic respiratory failure, secondary to (a) COVID-19 infection, (b) superimposed pneumonia after recent influenza infection,  Transferred to MICU on 2/23 after RRT on floors due to hypoxia even with max setting AVAPS. Patient started on midodrine and weaned off of levo on 2/24. Failed pressure support on 2/25 and started diuresis with Bumex. CTH was unremarkable and weaned off of Precedex on 3/1. On 3/4 patient extubated and C conversations with family revealed that they would want patient to be trached. On 3/7, patient found to have RLL consolidation on POCUS and started on Zosyn, intubated on 3/8 due to increased lethargy and inability to clear oral secretions. Trach placed on 3/12 AM and patient made NPO for PEG. Transferred to RCU on 3/13.

## 2025-03-13 NOTE — PROGRESS NOTE ADULT - SUBJECTIVE AND OBJECTIVE BOX
[facility] INPATIENT PROGRESS NOTE     Interval Hx:   03-13-25: Mr. Gr was seen at bedside today.    Meds:   MEDICATIONS  (STANDING):  albuterol/ipratropium for Nebulization 3 milliLiter(s) Nebulizer every 6 hours  atorvastatin 20 milliGRAM(s) Oral at bedtime  chlorhexidine 0.12% Liquid 15 milliLiter(s) Oral Mucosa every 12 hours  cholecalciferol 1000 Unit(s) Oral daily  dexMEDEtomidine Infusion 0.2 MICROgram(s)/kG/Hr (4.46 mL/Hr) IV Continuous <Continuous>  epoetin krystyna-epbx (RETACRIT) Injectable 47746 Unit(s) SubCutaneous <User Schedule>  escitalopram 15 milliGRAM(s) Oral with breakfast  gabapentin Solution 150 milliGRAM(s) Oral every 12 hours  influenza  Vaccine (HIGH DOSE) 0.5 milliLiter(s) IntraMuscular once  lacosamide IVPB 200 milliGRAM(s) IV Intermittent every 12 hours  lamoTRIgine 50 milliGRAM(s) Oral two times a day  lanolin Ointment 1 Application(s) Topical daily  levETIRAcetam  IVPB 750 milliGRAM(s) IV Intermittent every 12 hours  levoFLOXacin IVPB      levoFLOXacin IVPB 750 milliGRAM(s) IV Intermittent every 48 hours  lurasidone 20 milliGRAM(s) Oral at bedtime  nystatin Powder 1 Application(s) Topical two times a day  pantoprazole  Injectable 40 milliGRAM(s) IV Push daily  polyethylene glycol 3350 17 Gram(s) Oral daily  propofol Infusion 50 MICROgram(s)/kG/Min (26.7 mL/Hr) IV Continuous <Continuous>  senna Syrup 10 milliLiter(s) Oral at bedtime  sevelamer carbonate Powder 1600 milliGRAM(s) Oral every 8 hours    MEDICATIONS  (PRN):  artificial tears (preservative free) Ophthalmic Solution 1 Drop(s) Both EYES every 6 hours PRN Dry Eyes    Vital Signs Last 24 Hrs  T(C): 36.3 (13 Mar 2025 04:00), Max: 36.9 (12 Mar 2025 16:00)  T(F): 97.3 (13 Mar 2025 04:00), Max: 98.5 (12 Mar 2025 16:00)  HR: 69 (13 Mar 2025 04:19) (60 - 93)  BP: 86/51 (13 Mar 2025 04:19) (81/41 - 172/80)  BP(mean): 66 (13 Mar 2025 04:19) (58 - 115)  RR: 16 (13 Mar 2025 04:19) (16 - 41)  SpO2: 97% (13 Mar 2025 04:19) (93% - 100%)    Parameters below as of 12 Mar 2025 23:14  Patient On (Oxygen Delivery Method): ventilator    Physical Exam:  Gen: NAD, intubated sedated   HEENT: EOMI, MMM  Chest: equal chest rise  Cardiac: regular   Abd: non distended    Labs:                        7.9    7.78  )-----------( 130      ( 13 Mar 2025 00:16 )             24.8     CBC Full  -  ( 13 Mar 2025 00:16 )  WBC Count : 7.78 K/uL  RBC Count : 2.53 M/uL  Hemoglobin : 7.9 g/dL  Hematocrit : 24.8 %  Platelet Count - Automated : 130 K/uL  Mean Cell Volume : 98.0 fl  Mean Cell Hemoglobin : 31.2 pg  Mean Cell Hemoglobin Concentration : 31.9 g/dL    03-13    141  |  105  |  58[H]  ----------------------------<  107[H]  4.3   |  21[L]  |  3.06[H]    Ca    9.6      13 Mar 2025 00:16  Phos  4.4     03-13  Mg     2.3     03-13    TPro  6.3  /  Alb  2.7[L]  /  TBili  0.4  /  DBili  x   /  AST  46[H]  /  ALT  38  /  AlkPhos  228[H]  03-13    PT/INR - ( 13 Mar 2025 00:16 )   PT: 12.4 sec;   INR: 1.09 ratio         PTT - ( 13 Mar 2025 00:16 )  PTT:22.9 sec  Bilirubin Total: 0.4 mg/dL (03-13-25 @ 00:16)   [facility] INPATIENT PROGRESS NOTE     Interval Hx:   03-13-25: Mr. Gr was seen at bedside today, continues in MICU, s/p Tracheostomy 03/12/2025 with tracheal intubation, continues with sedation     Meds:   MEDICATIONS  (STANDING):  albuterol/ipratropium for Nebulization 3 milliLiter(s) Nebulizer every 6 hours  atorvastatin 20 milliGRAM(s) Oral at bedtime  chlorhexidine 0.12% Liquid 15 milliLiter(s) Oral Mucosa every 12 hours  cholecalciferol 1000 Unit(s) Oral daily  dexMEDEtomidine Infusion 0.2 MICROgram(s)/kG/Hr (4.46 mL/Hr) IV Continuous <Continuous>  epoetin krystyna-epbx (RETACRIT) Injectable 42651 Unit(s) SubCutaneous <User Schedule>  escitalopram 15 milliGRAM(s) Oral with breakfast  gabapentin Solution 150 milliGRAM(s) Oral every 12 hours  influenza  Vaccine (HIGH DOSE) 0.5 milliLiter(s) IntraMuscular once  lacosamide IVPB 200 milliGRAM(s) IV Intermittent every 12 hours  lamoTRIgine 50 milliGRAM(s) Oral two times a day  lanolin Ointment 1 Application(s) Topical daily  levETIRAcetam  IVPB 750 milliGRAM(s) IV Intermittent every 12 hours  levoFLOXacin IVPB      levoFLOXacin IVPB 750 milliGRAM(s) IV Intermittent every 48 hours  lurasidone 20 milliGRAM(s) Oral at bedtime  nystatin Powder 1 Application(s) Topical two times a day  pantoprazole  Injectable 40 milliGRAM(s) IV Push daily  polyethylene glycol 3350 17 Gram(s) Oral daily  propofol Infusion 50 MICROgram(s)/kG/Min (26.7 mL/Hr) IV Continuous <Continuous>  senna Syrup 10 milliLiter(s) Oral at bedtime  sevelamer carbonate Powder 1600 milliGRAM(s) Oral every 8 hours    MEDICATIONS  (PRN):  artificial tears (preservative free) Ophthalmic Solution 1 Drop(s) Both EYES every 6 hours PRN Dry Eyes    Vital Signs Last 24 Hrs  T(C): 36.3 (13 Mar 2025 04:00), Max: 36.9 (12 Mar 2025 16:00)  T(F): 97.3 (13 Mar 2025 04:00), Max: 98.5 (12 Mar 2025 16:00)  HR: 69 (13 Mar 2025 04:19) (60 - 93)  BP: 86/51 (13 Mar 2025 04:19) (81/41 - 172/80)  BP(mean): 66 (13 Mar 2025 04:19) (58 - 115)  RR: 16 (13 Mar 2025 04:19) (16 - 41)  SpO2: 97% (13 Mar 2025 04:19) (93% - 100%)    Parameters below as of 12 Mar 2025 23:14  Patient On (Oxygen Delivery Method): ventilator    Physical Exam:  Gen: NAD, tracheostomy- intubated sedated   HEENT: EOMI, MMM  Chest: equal chest rise  Cardiac: regular   Abd: non distended    Labs:                        7.9    7.78  )-----------( 130      ( 13 Mar 2025 00:16 )             24.8     CBC Full  -  ( 13 Mar 2025 00:16 )  WBC Count : 7.78 K/uL  RBC Count : 2.53 M/uL  Hemoglobin : 7.9 g/dL  Hematocrit : 24.8 %  Platelet Count - Automated : 130 K/uL  Mean Cell Volume : 98.0 fl  Mean Cell Hemoglobin : 31.2 pg  Mean Cell Hemoglobin Concentration : 31.9 g/dL    03-13    141  |  105  |  58[H]  ----------------------------<  107[H]  4.3   |  21[L]  |  3.06[H]    Ca    9.6      13 Mar 2025 00:16  Phos  4.4     03-13  Mg     2.3     03-13    TPro  6.3  /  Alb  2.7[L]  /  TBili  0.4  /  DBili  x   /  AST  46[H]  /  ALT  38  /  AlkPhos  228[H]  03-13    PT/INR - ( 13 Mar 2025 00:16 )   PT: 12.4 sec;   INR: 1.09 ratio         PTT - ( 13 Mar 2025 00:16 )  PTT:22.9 sec  Bilirubin Total: 0.4 mg/dL (03-13-25 @ 00:16)

## 2025-03-13 NOTE — PROGRESS NOTE ADULT - PROBLEM SELECTOR PLAN 7
- Continue enteral feeds  - NPO after midnight 3/13  - plan for PEG 3/14  - Continue Senna and Miralax - Continue Lurasidone

## 2025-03-13 NOTE — PROGRESS NOTE ADULT - SUBJECTIVE AND OBJECTIVE BOX
[facility]  JACINTO Horton Y. Patel, S. Shah, G. Casimir  [phone]  ([phone] - weekdays after 5pm and weekends)    Name: OSCAR MILAN  Age/Gender: 69y Male  MRN: [id number]    Interval History:  Patient seen and examined this morning.   Unable to obtain ROS.   Notes reviewed  No concerning overnight events  Afebrile   Allergies: seasonal allergies (Unknown)  No Known Drug Allergies      Objective:  Vitals:   T(F): 97.3 (03-13-25 @ 04:00), Max: 98.5 (03-12-25 @ 16:00)  HR: 69 (03-13-25 @ 07:00) (62 - 93)  BP: 98/50 (03-13-25 @ 07:00) (81/41 - 172/80)  RR: 18 (03-13-25 @ 07:00) (16 - 41)  SpO2: 97% (03-13-25 @ 07:00) (93% - 100%)  Physical Examination:  General: no acute distress  HEENT: NC/AT, upper lip lesion, NGT, trach  Respiratory: decreased breath sounds b/l   Cardiovascular: S1 and S2 present, normal rate   Gastrointestinal: soft, nondistended  Extremities: no LE edema, no cyanosis  Skin: no visible rash    Laboratory Studies:  CBC:                       7.9    7.78  )-----------( 130      ( 13 Mar 2025 00:16 )             24.8     WBC Trend:  7.78 03-13-25 @ 00:16  6.57 03-12-25 @ 00:26  6.95 03-11-25 @ 00:37  7.13 03-10-25 @ 08:32  7.07 03-10-25 @ 00:18  11.82 03-09-25 @ 00:27  13.54 03-08-25 @ 01:11  20.24 03-08-25 @ 00:16  14.83 03-06-25 @ 23:42    CMP: 03-13    141  |  105  |  58[H]  ----------------------------<  107[H]  4.3   |  21[L]  |  3.06[H]    Ca    9.6      13 Mar 2025 00:16  Phos  4.4     03-13  Mg     2.3     03-13    TPro  6.3  /  Alb  2.7[L]  /  TBili  0.4  /  DBili  x   /  AST  46[H]  /  ALT  38  /  AlkPhos  228[H]  03-13    Creatinine: 3.06 mg/dL (03-13-25 @ 00:16)  Creatinine: 2.81 mg/dL (03-12-25 @ 00:26)  Creatinine: 3.00 mg/dL (03-11-25 @ 00:36)  Creatinine: 2.95 mg/dL (03-10-25 @ 00:18)  Creatinine: 2.92 mg/dL (03-09-25 @ 00:27)  Creatinine: 2.40 mg/dL (03-08-25 @ 00:16)  Creatinine: 2.52 mg/dL (03-06-25 @ 23:42)    LIVER FUNCTIONS - ( 13 Mar 2025 00:16 )  Alb: 2.7 g/dL / Pro: 6.3 g/dL / ALK PHOS: 228 U/L / ALT: 38 U/L / AST: 46 U/L / GGT: x           Microbiology: reviewed   Culture - Urine (collected 03-09-25 @ 18:14)  Source: Catheterized Catheterized  Final Report (03-11-25 @ 13:23):    >100,000 CFU/ml Pseudomonas aeruginosa  Organism: Pseudomonas aeruginosa (03-11-25 @ 13:23)  Organism: Pseudomonas aeruginosa (03-11-25 @ 13:23)      Method Type: MARIELENA      -  Amikacin: S <=16      -  Aztreonam: R >16      -  Cefepime: I 16      -  Ceftazidime: R >16      -  Ciprofloxacin: S <=0.25      -  Imipenem: S <=1      -  Levofloxacin: S <=0.5      -  Meropenem: S <=1      -  Piperacillin/Tazobactam: R >64    Culture - Bronchial (collected 03-08-25 @ 15:27)  Source: Bronchial Bronchial Lavage  Gram Stain (03-09-25 @ 00:20):    Moderate polymorphonuclear leukocytes per low power field    No Squamous epithelial cells per low power field    Moderate Gram Negative Rods per oil power field    Few Gram positive cocci in pairs per oil power field  Final Report (03-10-25 @ 15:13):    Moderate Pseudomonas aeruginosa    Commensal lucia consistent with body site  Organism: Pseudomonas aeruginosa (03-10-25 @ 15:13)  Organism: Pseudomonas aeruginosa (03-10-25 @ 15:13)      Method Type: MARIELENA      -  Aztreonam: S 8      -  Cefepime: S 8      -  Ceftazidime: S 8      -  Ciprofloxacin: S <=0.25      -  Imipenem: S <=1      -  Levofloxacin: S <=0.5      -  Meropenem: S <=1      -  Piperacillin/Tazobactam: R 64    Culture - Blood (collected 03-08-25 @ 00:00)  Source: Blood Blood  Final Report (03-13-25 @ 04:00):    No growth at 5 days    Culture - Blood (collected 03-07-25 @ 23:30)  Source: Blood Blood  Final Report (03-13-25 @ 04:00):    No growth at 5 days    Culture - Sputum (collected 03-07-25 @ 18:42)  Source: Sputum Sputum  Gram Stain (03-08-25 @ 07:01):    Rare polymorphonuclear leukocytes per low power field    Few Squamous epithelial cells per low power field    Moderate Gram Negative Rods seen per oil power field  Final Report (03-09-25 @ 16:20):    Numerous Pseudomonas aeruginosa    Commensal lucia consistent with body site  Organism: Pseudomonas aeruginosa (03-09-25 @ 16:20)  Organism: Pseudomonas aeruginosa (03-09-25 @ 16:20)      Method Type: MARIELENA      -  Aztreonam: S 8      -  Cefepime: S 8      -  Ceftazidime: S 8      -  Ciprofloxacin: S <=0.25      -  Imipenem: S <=1      -  Levofloxacin: S <=0.5      -  Meropenem: S <=1      -  Piperacillin/Tazobactam: R 64    Culture - Eye (collected 03-05-25 @ 16:15)  Source: Eye Conjunctiva-Right  Gram Stain (03-08-25 @ 00:03):    No polymorphonuclear cells seen    No organisms seen    by cytocentrifuge  Final Report (03-10-25 @ 19:22):    Rare Staphylococcus epidermidis  Organism: Staphylococcus epidermidis (03-10-25 @ 19:22)      Method Type: MARIELENA      -  Clindamycin: R 4      -  Erythromycin: S <=0.25      -  Gentamicin: S <=4 Should not be used as monotherapy      -  Oxacillin: R 2      -  Penicillin: R 1      -  Rifampin: S <=1 Should not be used as monotherapy      -  Tetracycline: S <=4      -  Trimethoprim/Sulfamethoxazole: S <=0.5/9.5      -  Vancomycin: S 1  Organism: Staphylococcus epidermidis (03-10-25 @ 19:22)    Radiology: reviewed     Medications:  albuterol/ipratropium for Nebulization 3 milliLiter(s) Nebulizer every 6 hours  artificial tears (preservative free) Ophthalmic Solution 1 Drop(s) Both EYES every 6 hours PRN  atorvastatin 20 milliGRAM(s) Oral at bedtime  chlorhexidine 0.12% Liquid 15 milliLiter(s) Oral Mucosa every 12 hours  cholecalciferol 1000 Unit(s) Oral daily  dexMEDEtomidine Infusion 0.2 MICROgram(s)/kG/Hr IV Continuous <Continuous>  epoetin krystyna-epbx (RETACRIT) Injectable 66306 Unit(s) SubCutaneous <User Schedule>  escitalopram 15 milliGRAM(s) Oral with breakfast  gabapentin Solution 150 milliGRAM(s) Oral every 12 hours  influenza  Vaccine (HIGH DOSE) 0.5 milliLiter(s) IntraMuscular once  lacosamide IVPB 200 milliGRAM(s) IV Intermittent every 12 hours  lamoTRIgine 50 milliGRAM(s) Oral two times a day  lanolin Ointment 1 Application(s) Topical daily  levETIRAcetam  IVPB 750 milliGRAM(s) IV Intermittent every 12 hours  levoFLOXacin IVPB      levoFLOXacin IVPB 750 milliGRAM(s) IV Intermittent every 48 hours  lurasidone 20 milliGRAM(s) Oral at bedtime  nystatin Powder 1 Application(s) Topical two times a day  pantoprazole  Injectable 40 milliGRAM(s) IV Push daily  polyethylene glycol 3350 17 Gram(s) Oral daily  propofol Infusion 50 MICROgram(s)/kG/Min IV Continuous <Continuous>  senna Syrup 10 milliLiter(s) Oral at bedtime  sevelamer carbonate Powder 1600 milliGRAM(s) Oral every 8 hours    Current Antimicrobials:  levoFLOXacin IVPB      levoFLOXacin IVPB 750 milliGRAM(s) IV Intermittent every 48 hours    Prior/Completed Antimicrobials:  levoFLOXacin IVPB  piperacillin/tazobactam IVPB.  piperacillin/tazobactam IVPB.-  piperacillin/tazobactam IVPB.-  piperacillin/tazobactam IVPB..  piperacillin/tazobactam IVPB..  piperacillin/tazobactam IVPB...  remdesivir  IVPB  remdesivir  IVPB  remdesivir  IVPB  vancomycin  IVPB.

## 2025-03-13 NOTE — PROGRESS NOTE ADULT - ASSESSMENT
Patient is a 69 year old male with PMH of HTN, HLD, VAZQUEZ (on CPAP at bedtime, NC 2 liters during the day), CKD3, epilepsy, schizophrenia, developmental delay, metastatic testicular cancer (s/p orchiectomy, actively on chemotherapy, last dose of etoposide/cisplatin on 6/2024) presenting with worsening shortness of breath. Patient was recently hospitalized at [facility] from January 27th 2025 to February 6th 2025 for seizure and influenza virus infection, which required intubation. He was sent to rehab (originally from home) from hospital and now returns due to sudden onset shortness of breath and worsening oxygenation. Of note, he tested positive for COVID-19 at facility on 2/13/25 and was not put on any COVID-19 treatment at the time, only guaifenesin and scopolamine patch. Patient was placed on non-rebreather and sent to the hospital on 2/16/25. Noted initial discussion with patient/family and they decided to hold off on remdesivir given LFTs and SHAGUFTA.     Acute on chronic hypoxic respiratory failure  COVID-19 pneumonia, superimposed bacterial pneumonia  Acute on chronic HFpEF  Klebsiella UTI, treated   SHAGUFTA on CKD  Severe sepsis, hypotension, SHAGUFTA, likely due to aspiration pneumonia, treated  Now with fever with copious secretions post extubation - Pseudomonas pneumonia   Pseudomonas UTI  - CT chest with extensive b/l ggo c/w COVID pneumonia; tracheomalacia   - MRSA PCR screen negative, s/p vancomycin   - 2/18 worsening resp status, requiring AVAPS, started on remdesivir   - 2/22 completed remdesivir x5d course   - 2/23 s/p RRT for inc WOB, s/p intubation, suspected aspiration   - 2/24 intubated, on sedation, pressor support, SHAGUFTA, WBC wnl   - 2/25 afebrile, off pressor, WBC wnl, Cr stable  - 2/26 CT chest with improved upper lobe ggo, new/worsening confluent areas of consolidation in mid-lower lungs   - 2/26 completed dexamethasone x10d  - 2/28 completed zosyn   - 3/4 s/p extubation, now on NC, copious secretions requiring frequent suctioning   - 3/5 started on erythromycin for conjunctivitis, culture grew Staph epi - S to erythromycin   - 3/7 febrile, WBC noted, secretions less but still a lot, worsening pulm findings, likely aspirating, started on zosyn    - s/p re-intubation and bronchoscopy with L mainstem mucous plug, thick secretions -send for culture    - 3/7 Scx noted with Pseudomonas aeruginosa -- now R to zosyn (h/o pansensitive Pseudomonas in Scx in 1/2025)   - 3/8 BAL culture with mod Pseudomonas   -  3/9 pm switched from zosyn to levofloxacin based on sensitivities   - 3/9 noted with cloudy urine, UA with pyuria -- Ucx growing Pseudomonas also (sensitivities noted)   - 3/10 WBC normalized, Hb low, temps wnl, remains intubated   - 3/12 s/p tracheostomy      Recommendations:  Continue on levofloxacin (renally dosed) to complete 7 days on 3/15  Nephrology following, monitor Cr  Monitor temps/WBC  Supportive care  Aspiration precautions  Continue rest of care per primary team       Greyson Mart M.D.  [facility]  Available on [employer] TEAMS - *PREFERRED*  [phone]  After 5pm on weekdays and all day on weekends - please call [phone]     Thank you for consulting us and involving us in the management of this patients case. In addition to reviewing history, imaging, documents, labs, microbiology, took into account antibiotic stewardship, local antibiogram and infection control strategies and potential transmission issues.

## 2025-03-13 NOTE — PROGRESS NOTE ADULT - ASSESSMENT
69 year old male with a past medical history of hypertension, hyperlipemia VAZQUEZ (on CPAP at bedtime, NC 2 liters during the day), CKD stage 3, epilepsy, schizophrenia, developmental delay, metastatic testicular cancer (s/p orchiectomy, actively on chemotherapy, last dose of etoposide/cisplatin on 6/2024) presenting with worsening shortness of breath. Patient was recently hospitalized at [facility] from January 27th 2025 to February 6th 2025 for seizure and influenza virus infection, which required intubation. He was sent to rehab (originally from home) from hospital. He returns due to sudden onset shortness of breath and worsening oxygenation. Of note, he tested positive for COVID-19       1- SHAGUFTA on CKDIII  2- covid-19  3- anemia  4- hypotension  5- respiratory failure         SHAGUFTA suspected in setting of new infection. covid 19   cr and bun  at a plateau range   bp is better   trend cr and lytes   hyperphosphatemia   renvela 1600 mg tid  anemia, trend hb add retacrit [id number] U tiw sq  for trach   d.w MICU team when seen earlier

## 2025-03-13 NOTE — PROGRESS NOTE ADULT - ASSESSMENT

## 2025-03-13 NOTE — PROGRESS NOTE ADULT - SUBJECTIVE AND OBJECTIVE BOX
[facility]   [phone]  RENAL FOLLOW UP NOTE  --------------------------------------------------------------------------------  Chief Complaint:    24 hour events/subjective:    seen earlier  trach     PAST HISTORY  --------------------------------------------------------------------------------  No significant changes to PMH, PSH, FHx, SHx, unless otherwise noted    ALLERGIES & MEDICATIONS  --------------------------------------------------------------------------------  Allergies    seasonal allergies (Unknown)  No Known Drug Allergies    Intolerances    latex (Rash)    Standing Inpatient Medications  albuterol/ipratropium for Nebulization 3 milliLiter(s) Nebulizer every 6 hours  atorvastatin 20 milliGRAM(s) Oral at bedtime  chlorhexidine 0.12% Liquid 15 milliLiter(s) Oral Mucosa every 12 hours  cholecalciferol 1000 Unit(s) Oral daily  epoetin krystyna-epbx (RETACRIT) Injectable 33674 Unit(s) SubCutaneous <User Schedule>  escitalopram 15 milliGRAM(s) Oral with breakfast  gabapentin Solution 150 milliGRAM(s) Oral every 12 hours  influenza  Vaccine (HIGH DOSE) 0.5 milliLiter(s) IntraMuscular once  lacosamide IVPB 200 milliGRAM(s) IV Intermittent every 12 hours  lamoTRIgine 50 milliGRAM(s) Oral two times a day  lanolin Ointment 1 Application(s) Topical daily  levETIRAcetam  IVPB 750 milliGRAM(s) IV Intermittent every 12 hours  levoFLOXacin IVPB      levoFLOXacin IVPB 750 milliGRAM(s) IV Intermittent every 48 hours  lurasidone 20 milliGRAM(s) Oral at bedtime  nystatin Powder 1 Application(s) Topical two times a day  pantoprazole  Injectable 40 milliGRAM(s) IV Push daily  polyethylene glycol 3350 17 Gram(s) Oral daily  senna Syrup 10 milliLiter(s) Oral at bedtime  sevelamer carbonate Powder 1600 milliGRAM(s) Oral every 8 hours    PRN Inpatient Medications  acetaminophen     Tablet .. 650 milliGRAM(s) Oral every 6 hours PRN  aluminum hydroxide/magnesium hydroxide/simethicone Suspension 30 milliLiter(s) Oral every 4 hours PRN  artificial tears (preservative free) Ophthalmic Solution 1 Drop(s) Both EYES every 6 hours PRN  HYDROmorphone  Injectable 0.5 milliGRAM(s) IV Push every 4 hours PRN  melatonin 3 milliGRAM(s) Oral at bedtime PRN  ondansetron Injectable 4 milliGRAM(s) IV Push every 8 hours PRN      REVIEW OF SYSTEMS  --------------------------------------------------------------------------------        VITALS/PHYSICAL EXAM  --------------------------------------------------------------------------------  T(C): 36.8 (03-13-25 @ 12:00), Max: 36.9 (03-12-25 @ 16:00)  HR: 74 (03-13-25 @ 13:00) (65 - 84)  BP: 119/57 (03-13-25 @ 13:00) (81/41 - 141/65)  RR: 22 (03-13-25 @ 13:00) (16 - 25)  SpO2: 97% (03-13-25 @ 13:00) (93% - 100%)  Wt(kg): --        03-12-25 @ 07:01  -  03-13-25 @ 07:00  --------------------------------------------------------  IN: 1076.5 mL / OUT: 830 mL / NET: 246.5 mL    03-13-25 @ 07:01  -  03-13-25 @ 14:50  --------------------------------------------------------  IN: 26.8 mL / OUT: 300 mL / NET: -273.2 mL      Physical Exam:  	    Gen: ill appearing male,  remains intubated  	Pulm: decrease bs, +coarse bs    	CV: No JVD. RRR, S1S2; no rub  	Abd: +BS, soft, nondistended  	UE: Warm, no cyanosis  no clubbing,  no edema;  	LE: Warm, no cyanosis  no clubbing, no edema      LABS/STUDIES  --------------------------------------------------------------------------------              7.9    7.78  >-----------<  130      [03-13-25 @ 00:16]              24.8     141  |  105  |  58  ----------------------------<  107      [03-13-25 @ 00:16]  4.3   |  21  |  3.06        Ca     9.6     [03-13-25 @ 00:16]      Mg     2.3     [03-13-25 @ 00:16]      Phos  4.4     [03-13-25 @ 00:16]    TPro  6.3  /  Alb  2.7  /  TBili  0.4  /  DBili  x   /  AST  46  /  ALT  38  /  AlkPhos  228  [03-13-25 @ 00:16]    PT/INR: PT 12.4 , INR 1.09       [03-13-25 @ 00:16]  PTT: 22.9       [03-13-25 @ 00:16]      Creatinine Trend:  SCr 3.06 [03-13 @ 00:16]  SCr 2.81 [03-12 @ 00:26]  SCr 3.00 [03-11 @ 00:36]  SCr 2.95 [03-10 @ 00:18]  SCr 2.92 [03-09 @ 00:27]      Ferritin [id number]      [02-23-25 @ 06:15]  TSH 0.87      [01-25-25 @ 11:31]

## 2025-03-13 NOTE — PROGRESS NOTE ADULT - ATTENDING COMMENTS
Patient examined and case reviewed in detail on bedside rounds   Patient is critically ill and unstable on vent/pressors s/p trach  I provided 35+ minutes of critical care time today for this patient. This excludes time spent on procedures and teaching

## 2025-03-13 NOTE — PROGRESS NOTE ADULT - PROBLEM SELECTOR PLAN 6
- Continue Lurasidone Chronic heart failure with preserved ejection fraction.   ·  Plan: TTE done here 1/17/25 technically difficult, but LV systolic fxn appears nl without any regional wall motion abnormalities  - Repeat TTE pending  - BNP 5000 <---1100 but appears euvolemic   - s/p bumex gtt per ICU

## 2025-03-13 NOTE — PROGRESS NOTE ADULT - NUTRITIONAL ASSESSMENT
This patient has been assessed with a concern for Malnutrition and has been determined to have a diagnosis/diagnoses of Severe protein-calorie malnutrition.    This patient is being managed with:   Diet NPO after Midnight-     NPO Start Date: 13-Mar-2025   NPO Start Time: 23:59  Entered: Mar 13 2025  2:32PM    Diet NPO after Midnight-     NPO Start Date: 13-Mar-2025   NPO Start Time: 23:59  Entered: Mar 13 2025  2:31PM   This patient has been assessed with a concern for Malnutrition and has been determined to have a diagnosis/diagnoses of Severe protein-calorie malnutrition.    This patient is being managed with:   Diet NPO after Midnight-     NPO Start Date: 13-Mar-2025   NPO Start Time: 23:59  Entered: Mar 13 2025  2:32PM

## 2025-03-13 NOTE — PROGRESS NOTE ADULT - PROBLEM SELECTOR PLAN 8
SHAGUFTA suspected in setting of new infection Covid 19      - hyperphosphatemia   Renvela 1600 mg tid  - trend hb add Retacrit 52973 U tiw sq  for trach - Continue enteral feeds  - NPO after midnight 3/13  - plan for PEG 3/14  - Continue Senna and Miralax

## 2025-03-13 NOTE — PROGRESS NOTE ADULT - PROBLEM SELECTOR PLAN 3
- MRI brain now with 5.4 mm enhancing lesion in the LEFT middle cerebellar peduncle with associated edema suspicious for metastases  - MRI Lumbar negative for acute disease  - Small lesion without mass effect or edema, recommended to correlate per neurology if foci and locus are concordant with seizure activity  - Heme/Onc following

## 2025-03-13 NOTE — PROGRESS NOTE ADULT - SUBJECTIVE AND OBJECTIVE BOX
Patient is a 69y old  Male who presents with a chief complaint of Acute on chronic hypoxemic respiratory failure (13 Mar 2025 14:49)    HPI:  69 year old male with a past medical history of hypertension, hyperlipemia VAZQUEZ (on CPAP at bedtime, NC 2 liters during the day), CKD stage 3, epilepsy, schizophrenia, developmental delay, metastatic testicular cancer (s/p orchiectomy, actively on chemotherapy, last dose of etoposide/cisplatin on 6/2024) presenting with worsening shortness of breath.   Patient was recently hospitalized at [facility] from January 27th 2025 to February 6th 2025 for seizure and influenza virus infection, which required intubation. He was sent to rehab (originally from home) from hospital.   He returns due to sudden onset shortness of breath and worsening oxygenation. Of note, he tested positive for COVID-19 at facility on 2/13/25 and was not put on any COVID-19 treatment at the time, only guaifenesin and scopolamine patch. Patient was placed on non-rebreather and sent to the hospital on 2/16/25.   In the ER, vital signs significant for T-max 101.9F, HR 84-90. WBC 6.8. Hemoglobin 9.1. Platelet 87. AST 70. ALT 48. Cr 3.27. BUN 45. pH 7.30, pCO2 48. UA negative for infection. Patient given vancomycin, Zosyn, albuterol, and  cc bolus. Patient admitted to the general medicine service for further care.   Patient evaluated at bedside during admission. Unable to talk with the patient given that he is on BIPAP. Attempted to take the patient off BIPAP while in the room, but patient's work of breathing immediately began to increase and patient endorsed shortness of breath, so he was placed back on. History taken by chart review.  (16 Feb 2025 13:16)    Interval Events:    REVIEW OF SYSTEMS:  [ ] Positive  [ ] All other systems negative  [ ] Unable to assess ROS because ________    Vital Signs Last 24 Hrs  T(C): 36.8 (03-13-25 @ 12:00), Max: 36.9 (03-12-25 @ 16:00)  T(F): 98.3 (03-13-25 @ 12:00), Max: 98.5 (03-12-25 @ 16:00)  HR: 74 (03-13-25 @ 13:00) (65 - 84)  BP: 119/57 (03-13-25 @ 13:00) (81/41 - 141/65)  RR: 22 (03-13-25 @ 13:00) (16 - 22)  SpO2: 97% (03-13-25 @ 13:00) (93% - 100%)    PHYSICAL EXAM:  HEENT:   [ ]Tracheostomy:  [ ]Pupils equal  [ ]No oral lesions  [ ]Abnormal    SKIN  [ ] No Rash  [ ] Abnormal  [ ] pressure    CARDIAC  [ ]Regular  [ ]Abnormal    PULMONARY  [ ]Bilateral Clear Breath Sounds  [ ]Normal Excursion  [ ]Abnormal    GI  [ ]PEG      [ ] +BS		              [ ]Soft, nondistended, nontender	  [ ]Abnormal    MUSCULOSKELETAL                                   [ ]Bedbound                 [ ]Abnormal    [ ]Ambulatory/OOB to chair                           EXTREMITIES                                         [ ]Normal  [ ]Edema                           NEUROLOGIC  [ ] Normal, non focal  [ ] Focal findings:    PSYCHIATRIC  [ ]Alert and appropriate  [ ] Sedated	 [ ]Agitated    :  [place]: [ ] Yes, if yes: Date of Placement:                   [  ] No    LINES: Central Lines [ ] Yes, if yes: Date of Placement                                     [  ] No    HOSPITAL MEDICATIONS:  MEDICATIONS  (STANDING):  albuterol/ipratropium for Nebulization 3 milliLiter(s) Nebulizer every 6 hours  atorvastatin 20 milliGRAM(s) Oral at bedtime  chlorhexidine 0.12% Liquid 15 milliLiter(s) Oral Mucosa every 12 hours  cholecalciferol 1000 Unit(s) Oral daily  epoetin krystyna-epbx (RETACRIT) Injectable 23691 Unit(s) SubCutaneous <User Schedule>  escitalopram 15 milliGRAM(s) Oral with breakfast  gabapentin Solution 150 milliGRAM(s) Oral every 12 hours  influenza  Vaccine (HIGH DOSE) 0.5 milliLiter(s) IntraMuscular once  lacosamide IVPB 200 milliGRAM(s) IV Intermittent every 12 hours  lamoTRIgine 50 milliGRAM(s) Oral two times a day  lanolin Ointment 1 Application(s) Topical daily  levETIRAcetam  IVPB 750 milliGRAM(s) IV Intermittent every 12 hours  levoFLOXacin IVPB      levoFLOXacin IVPB 750 milliGRAM(s) IV Intermittent every 48 hours  lurasidone 20 milliGRAM(s) Oral at bedtime  nystatin Powder 1 Application(s) Topical two times a day  pantoprazole  Injectable 40 milliGRAM(s) IV Push daily  polyethylene glycol 3350 17 Gram(s) Oral daily  senna Syrup 10 milliLiter(s) Oral at bedtime  sevelamer carbonate Powder 1600 milliGRAM(s) Oral every 8 hours    MEDICATIONS  (PRN):  acetaminophen     Tablet .. 650 milliGRAM(s) Oral every 6 hours PRN Temp greater or equal to 38C (100.4F), Mild Pain (1 - 3)  aluminum hydroxide/magnesium hydroxide/simethicone Suspension 30 milliLiter(s) Oral every 4 hours PRN Dyspepsia  artificial tears (preservative free) Ophthalmic Solution 1 Drop(s) Both EYES every 6 hours PRN Dry Eyes  HYDROmorphone  Injectable 0.5 milliGRAM(s) IV Push every 4 hours PRN Severe Pain (7 - 10)  melatonin 3 milliGRAM(s) Oral at bedtime PRN Insomnia  ondansetron Injectable 4 milliGRAM(s) IV Push every 8 hours PRN Nausea and/or Vomiting      LABS:                        7.9    7.78  )-----------( 130      ( 13 Mar 2025 00:16 )             24.8     03-13    141  |  105  |  58[H]  ----------------------------<  107[H]  4.3   |  21[L]  |  3.06[H]    Ca    9.6      13 Mar 2025 00:16  Phos  4.4     03-13  Mg     2.3     03-13    TPro  6.3  /  Alb  2.7[L]  /  TBili  0.4  /  DBili  x   /  AST  46[H]  /  ALT  38  /  AlkPhos  228[H]  03-13    PT/INR - ( 13 Mar 2025 00:16 )   PT: 12.4 sec;   INR: 1.09 ratio    PTT - ( 13 Mar 2025 00:16 )  PTT:22.9 sec    Arterial Blood Gas:  03-13 @ 00:21  7.35/42/112/23/99.4/-2.3  ABG lactate: --  Arterial Blood Gas:  03-12 @ 00:15  7.38/44/150/26/99.6/0.5  ABG lactate: --    Mode: AC/ CMV (Assist Control/ Continuous Mandatory Ventilation)  RR (machine): 16  TV (machine): 450  FiO2: 30  PEEP: 6  ITime: 0.9  MAP: 9.2  PIP: 28 Patient is a 69y old  Male who presents with a chief complaint of Acute on chronic hypoxemic respiratory failure (13 Mar 2025 14:49)    HPI:  69 year old male with a past medical history of hypertension, hyperlipemia VAZQUEZ (on CPAP at bedtime, NC 2 liters during the day), CKD stage 3, epilepsy, schizophrenia, developmental delay, metastatic testicular cancer (s/p orchiectomy, actively on chemotherapy, last dose of etoposide/cisplatin on 6/2024) presenting with worsening shortness of breath.   Patient was recently hospitalized at [facility] from January 27th 2025 to February 6th 2025 for seizure and influenza virus infection, which required intubation. He was sent to rehab (originally from home) from hospital.   He returns due to sudden onset shortness of breath and worsening oxygenation. Of note, he tested positive for COVID-19 at facility on 2/13/25 and was not put on any COVID-19 treatment at the time, only guaifenesin and scopolamine patch. Patient was placed on non-rebreather and sent to the hospital on 2/16/25.   In the ER, vital signs significant for T-max 101.9F, HR 84-90. WBC 6.8. Hemoglobin 9.1. Platelet 87. AST 70. ALT 48. Cr 3.27. BUN 45. pH 7.30, pCO2 48. UA negative for infection. Patient given vancomycin, Zosyn, albuterol, and  cc bolus. Patient admitted to the general medicine service for further care.   Patient evaluated at bedside during admission. Unable to talk with the patient given that he is on BIPAP. Attempted to take the patient off BIPAP while in the room, but patient's work of breathing immediately began to increase and patient endorsed shortness of breath, so he was placed back on. History taken by chart review.  (16 Feb 2025 13:16)    Interval Events: Transferred from MICU    REVIEW OF SYSTEMS:  [ ] Positive  [x ] All other systems negative  [ ] Unable to assess ROS because ________    Vital Signs Last 24 Hrs  T(C): 36.8 (03-13-25 @ 12:00), Max: 36.9 (03-12-25 @ 16:00)  T(F): 98.3 (03-13-25 @ 12:00), Max: 98.5 (03-12-25 @ 16:00)  HR: 74 (03-13-25 @ 13:00) (65 - 84)  BP: 119/57 (03-13-25 @ 13:00) (81/41 - 141/65)  RR: 22 (03-13-25 @ 13:00) (16 - 22)  SpO2: 97% (03-13-25 @ 13:00) (93% - 100%)    PHYSICAL EXAM:  HEENT:   [ x]Tracheostomy: #7 cuffed Portex  [ ]Pupils equal  [ ]No oral lesions  [ ]Abnormal    SKIN  [ ] No Rash  [x ] Abnormal; fungal rash in groin  [ ] pressure    CARDIAC  [x ]Regular  [ ]Abnormal    PULMONARY  [ ]Bilateral Clear Breath Sounds  [ ]Normal Excursion  [ ]Abnormal    GI  [ ]PEG      [x ] +BS		              [ x]Soft, nondistended, nontender	  [ ]Abnormal    MUSCULOSKELETAL                                   [ ]Bedbound                 [ ]Abnormal    [x ]OOB to chair                           EXTREMITIES                                         [ ]Normal  [x ]Edema  B/L U/E                         NEUROLOGIC  [ x] Normal, non focal  [ ] Focal findings:    PSYCHIATRIC  [x]Alert and appropriate  [ ] Sedated	 [ ]Agitated    :  [place]: [ ] Yes, if yes: Date of Placement:                   [x  ] No    LINES: Central Lines [ ] Yes, if yes: Date of Placement                                     [ x ] No    HOSPITAL MEDICATIONS:  MEDICATIONS  (STANDING):  albuterol/ipratropium for Nebulization 3 milliLiter(s) Nebulizer every 6 hours  atorvastatin 20 milliGRAM(s) Oral at bedtime  chlorhexidine 0.12% Liquid 15 milliLiter(s) Oral Mucosa every 12 hours  cholecalciferol 1000 Unit(s) Oral daily  epoetin krystyna-epbx (RETACRIT) Injectable 63249 Unit(s) SubCutaneous <User Schedule>  escitalopram 15 milliGRAM(s) Oral with breakfast  gabapentin Solution 150 milliGRAM(s) Oral every 12 hours  influenza  Vaccine (HIGH DOSE) 0.5 milliLiter(s) IntraMuscular once  lacosamide IVPB 200 milliGRAM(s) IV Intermittent every 12 hours  lamoTRIgine 50 milliGRAM(s) Oral two times a day  lanolin Ointment 1 Application(s) Topical daily  levETIRAcetam  IVPB 750 milliGRAM(s) IV Intermittent every 12 hours  levoFLOXacin IVPB      levoFLOXacin IVPB 750 milliGRAM(s) IV Intermittent every 48 hours  lurasidone 20 milliGRAM(s) Oral at bedtime  nystatin Powder 1 Application(s) Topical two times a day  pantoprazole  Injectable 40 milliGRAM(s) IV Push daily  polyethylene glycol 3350 17 Gram(s) Oral daily  senna Syrup 10 milliLiter(s) Oral at bedtime  sevelamer carbonate Powder 1600 milliGRAM(s) Oral every 8 hours    MEDICATIONS  (PRN):  acetaminophen     Tablet .. 650 milliGRAM(s) Oral every 6 hours PRN Temp greater or equal to 38C (100.4F), Mild Pain (1 - 3)  aluminum hydroxide/magnesium hydroxide/simethicone Suspension 30 milliLiter(s) Oral every 4 hours PRN Dyspepsia  artificial tears (preservative free) Ophthalmic Solution 1 Drop(s) Both EYES every 6 hours PRN Dry Eyes  HYDROmorphone  Injectable 0.5 milliGRAM(s) IV Push every 4 hours PRN Severe Pain (7 - 10)  melatonin 3 milliGRAM(s) Oral at bedtime PRN Insomnia  ondansetron Injectable 4 milliGRAM(s) IV Push every 8 hours PRN Nausea and/or Vomiting      LABS:                        7.9    7.78  )-----------( 130      ( 13 Mar 2025 00:16 )             24.8     03-13    141  |  105  |  58[H]  ----------------------------<  107[H]  4.3   |  21[L]  |  3.06[H]    Ca    9.6      13 Mar 2025 00:16  Phos  4.4     03-13  Mg     2.3     03-13    TPro  6.3  /  Alb  2.7[L]  /  TBili  0.4  /  DBili  x   /  AST  46[H]  /  ALT  38  /  AlkPhos  228[H]  03-13    PT/INR - ( 13 Mar 2025 00:16 )   PT: 12.4 sec;   INR: 1.09 ratio    PTT - ( 13 Mar 2025 00:16 )  PTT:22.9 sec    Arterial Blood Gas:  03-13 @ 00:21  7.35/42/112/23/99.4/-2.3  ABG lactate: --  Arterial Blood Gas:  03-12 @ 00:15  7.38/44/150/26/99.6/0.5  ABG lactate: --    Mode: AC/ CMV (Assist Control/ Continuous Mandatory Ventilation)  RR (machine): 16  TV (machine): 450  FiO2: 30  PEEP: 6  ITime: 0.9  MAP: 9.2  PIP: 28

## 2025-03-13 NOTE — PROGRESS NOTE ADULT - PROBLEM SELECTOR PLAN 1
Primarily caused by Covid, superimposed by bacterial pneumonia  - worsening secretions and consolidations on 3/8  - Now s/p trach 3/12    - Sputum cx. with Pseudomonas in sputum, resistant to Zosyn.  - Levaquin renally dosed 750 q48h (3/9-3/16) to complete 7d course.

## 2025-03-13 NOTE — PROGRESS NOTE ADULT - PROBLEM SELECTOR PLAN 9
- SCD's  - PPI SHAGUFTA suspected in setting of new infection Covid 19      - hyperphosphatemia   Renvela 1600 mg tid  - trend hb add Retacrit 37057 U tiw sq  for trach

## 2025-03-13 NOTE — PROGRESS NOTE ADULT - NUTRITIONAL ASSESSMENT
This patient has been assessed with a concern for Malnutrition and has been determined to have a diagnosis/diagnoses of Severe protein-calorie malnutrition.    This patient is being managed with:   Diet NPO after Midnight-     NPO Start Date: 12-Mar-2025   NPO Start Time: 23:59  Entered: Mar 12 2025  3:43PM    Diet NPO with Tube Feed-  Tube Feeding Modality: Nasogastric  Jevity 1.2 Garth (JEVITY1.2RTH)  Total Volume for 24 Hours (mL): 1080  Continuous  Starting Tube Feed Rate {mL per Hour}: 10  Increase Tube Feed Rate by (mL): 10     Every 4 hours  Until Goal Tube Feed Rate (mL per Hour): 60  Tube Feed Duration (in Hours): 18  Tube Feed Start Time: 11:00  Entered: Mar  9 2025 11:02AM

## 2025-03-13 NOTE — CHART NOTE - NSCHARTNOTEFT_GEN_A_CORE
MICU DOWN GRADE NOTE    Bed: RCU 13  Provider:     Patient is a 69y old  Male who presents with a chief complaint of Acute on chronic hypoxemic respiratory failure (13 Mar 2025 08:09)    For follow up:  []PEG evaluation when stable  []Restart BB if pt goes into Afib again  []Levaquin to finish on 3/16 (7 day course)    MICU Course:  Transferred to MICU on 2/23 after RRT on floors due to hypoxia even with max setting AVAPS. Patient started on midodrine and weaned off of levo on 2/24. Failed pressure support on 2/25 and started diuresis with Bumex. CTH was unremarkable and weaned off of precedex on 3/1. On 3/4, patient extubated and GOC conversations with family revealed that they would want patient to be trached. On 3/7, patient found to have RLL consolidation on POCUS and started on Zosyn, intubated on 3/8 due to increased lethargy. Trach placed on 3/12 AM and patient made NPO for PEG. Transferred to RCU on 3/13.      REVIEW OF SYSTEMS:  CONSTITUTIONAL: No fever, chills  HEENT:  No blurry vision No sinus or throat pain  NECK: No pain or stiffness  RESPIRATORY: No cough, wheezing, chills or hemoptysis; No shortness of breath  CARDIOVASCULAR: No chest pain, palpitations  GASTROINTESTINAL: No abdominal pain. No nausea, vomiting, or diarrhea  GENITOURINARY: No dysuria  NEUROLOGICAL: No HA, No focal weakness  SKIN: No itching, burning, rashes, or lesions   MUSCULOSKELETAL: No joint pain or swelling; No muscle, back, or extremity pain    MEDICATIONS:  albuterol/ipratropium for Nebulization 3 milliLiter(s) Nebulizer every 6 hours  artificial tears (preservative free) Ophthalmic Solution 1 Drop(s) Both EYES every 6 hours PRN  atorvastatin 20 milliGRAM(s) Oral at bedtime  chlorhexidine 0.12% Liquid 15 milliLiter(s) Oral Mucosa every 12 hours  cholecalciferol 1000 Unit(s) Oral daily  epoetin krystyna-epbx (RETACRIT) Injectable 42990 Unit(s) SubCutaneous <User Schedule>  escitalopram 15 milliGRAM(s) Oral with breakfast  gabapentin Solution 150 milliGRAM(s) Oral every 12 hours  HYDROmorphone  Injectable 0.5 milliGRAM(s) IV Push every 4 hours PRN  influenza  Vaccine (HIGH DOSE) 0.5 milliLiter(s) IntraMuscular once  lacosamide IVPB 200 milliGRAM(s) IV Intermittent every 12 hours  lamoTRIgine 50 milliGRAM(s) Oral two times a day  lanolin Ointment 1 Application(s) Topical daily  levETIRAcetam  IVPB 750 milliGRAM(s) IV Intermittent every 12 hours  levoFLOXacin IVPB      levoFLOXacin IVPB 750 milliGRAM(s) IV Intermittent every 48 hours  lurasidone 20 milliGRAM(s) Oral at bedtime  nystatin Powder 1 Application(s) Topical two times a day  pantoprazole  Injectable 40 milliGRAM(s) IV Push daily  polyethylene glycol 3350 17 Gram(s) Oral daily  senna Syrup 10 milliLiter(s) Oral at bedtime  sevelamer carbonate Powder 1600 milliGRAM(s) Oral every 8 hours      T(C): 36.8 (03-13-25 @ 12:00), Max: 36.9 (03-12-25 @ 16:00)  HR: 74 (03-13-25 @ 13:00) (65 - 84)  BP: 119/57 (03-13-25 @ 13:00) (81/41 - 145/68)  RR: 22 (03-13-25 @ 13:00) (16 - 41)  SpO2: 97% (03-13-25 @ 13:00) (93% - 100%)  Wt(kg): --Vital Signs Last 24 Hrs  T(C): 36.8 (13 Mar 2025 12:00), Max: 36.9 (12 Mar 2025 16:00)  T(F): 98.3 (13 Mar 2025 12:00), Max: 98.5 (12 Mar 2025 16:00)  HR: 74 (13 Mar 2025 13:00) (65 - 84)  BP: 119/57 (13 Mar 2025 13:00) (81/41 - 145/68)  BP(mean): 82 (13 Mar 2025 13:00) (58 - 96)  RR: 22 (13 Mar 2025 13:00) (16 - 41)  SpO2: 97% (13 Mar 2025 13:00) (93% - 100%)    Parameters below as of 13 Mar 2025 11:59  Patient On (Oxygen Delivery Method): ventilator        PHYSICAL EXAM:  GENERAL: NAD, well-groomed, well-developed  HEAD:  Atraumatic, Normocephalic  EYES: EOMI, PERRLA, conjunctiva and sclera clear  ENMT:  Moist mucous membranes  NECK: Supple, No JVD,  CHEST/LUNG: Clear to auscultation bilaterally; No rales, rhonchi, wheezing, or rubs  HEART: Regular rate and rhythm; No murmurs, rubs, or gallops  ABDOMEN: Soft, Nontender, Nondistended; Bowel sounds present  NEURO: Alert & Oriented X3  EXTREMITIES: No LE edema, no calf tenderness  LYMPH: No lymphadenopathy noted  SKIN: No rashes or lesions    Consultant(s) Notes Reviewed:  [x ] YES  [ ] NO  Care Discussed with Consultants/Other Providers [ x] YES  [ ] NO    LABS:                        7.9    7.78  )-----------( 130      ( 13 Mar 2025 00:16 )             24.8     03-13    141  |  105  |  58[H]  ----------------------------<  107[H]  4.3   |  21[L]  |  3.06[H]    Ca    9.6      13 Mar 2025 00:16  Phos  4.4     03-13  Mg     2.3     03-13    TPro  6.3  /  Alb  2.7[L]  /  TBili  0.4  /  DBili  x   /  AST  46[H]  /  ALT  38  /  AlkPhos  228[H]  03-13    PT/INR - ( 13 Mar 2025 00:16 )   PT: 12.4 sec;   INR: 1.09 ratio         PTT - ( 13 Mar 2025 00:16 )  PTT:22.9 sec  Urinalysis Basic - ( 13 Mar 2025 00:16 )    Color: x / Appearance: x / SG: x / pH: x  Gluc: 107 mg/dL / Ketone: x  / Bili: x / Urobili: x   Blood: x / Protein: x / Nitrite: x   Leuk Esterase: x / RBC: x / WBC x   Sq Epi: x / Non Sq Epi: x / Bacteria: x      CAPILLARY BLOOD GLUCOSE      POCT Blood Glucose.: 116 mg/dL (13 Mar 2025 06:46)  POCT Blood Glucose.: 111 mg/dL (12 Mar 2025 19:56)      ABG - ( 13 Mar 2025 00:21 )  pH, Arterial: 7.35  pH, Blood: x     /  pCO2: 42    /  pO2: 112   / HCO3: 23    / Base Excess: -2.3  /  SaO2: 99.4              Urinalysis Basic - ( 13 Mar 2025 00:16 )    Color: x / Appearance: x / SG: x / pH: x  Gluc: 107 mg/dL / Ketone: x  / Bili: x / Urobili: x   Blood: x / Protein: x / Nitrite: x   Leuk Esterase: x / RBC: x / WBC x   Sq Epi: x / Non Sq Epi: x / Bacteria: x        RADIOLOGY & ADDITIONAL TESTS:    Imaging Personally Reviewed:  [x ] YES  [ ] NO    Assessment and Plan  	  69 year old male with a past medical history of hypertension, hyperlipemia VAZQUEZ (on CPAP at bedtime, NC 2 liters during the day), CKD stage 3, epilepsy, schizophrenia, developmental delay, metastatic testicular cancer (s/p orchiectomy, actively on chemotherapy, last dose of etoposide/cisplatin on 6/2024), presenting with acute on chronic hypoxemic respiratory failure, secondary to (a) COVID-19 infection, (b) superimposed pneumonia after recent influenza infection, and/or (c) fluid overload. MICU consulted and accepted after RRT due to increased work of breathing. Now s/p trach on 3/13.     NEURO:  #Mental status:  - hx of developmental delay  - Worsening mental status 3/8, likely iso respiratory distress  - Re-intubated 3/8, sedated with fent and prop    #Epilepsy:  - Continue with home meds which include Lacosamide, Lurasidone, Lamotrigine - increased to 50 BID  - EEG negative    #Brain Mets:  - MRI brain now with 5.4 mm enhancing lesion in the LEFT middle cerebellar peduncle with associated edema suspicious for metastases    # Schizophrenia  - Lurasidone halved to reduce oversedation    CV:  #Afib w/RVR  - New onset Afib RVR (on tele, confirmed with EKG 2/19)  - CHADSVASC 2   - off Heparin drip iso fluctuating H/H. given drop in H/H, patient is at increased risk of bleed. Will perform OPHELIA to clear LYNN. If clear, will hold AC moving forward   - restart BB if rates remain uncontrolled    #AHRF  - Likely 2/2 PNA, does not appear to CHF decompensation  - worsening secretions and consolidations on 3/8, iso fever/leukocytosis may be new PNA. Management as below  - Now s/p trach 3/12    RENAL:  #SHAGUFTA on CKD3  - Hx of CKD stage 3, Cr 2.38 at baseline, peak at 4.24  - appreciate nephro input   - continue Renvela and retacrit    GI:  #PEG eval  - plan for PEG once more hemodynamically stable and persistently off pressors     #Diet: Tube feeds via OG tube  #Bowel Regimen  - On Senna and Miralax    ENDO:  No acute issues  Thyroid nodule noted in prior notes    HEMATOLOGIC:  #Anemia  - CKD vs. cancer vs. sepsis  - 1 unit PRBCs given  - CTM CBC  - No sign of bleeding  - retacrit 10,000 tiw sq    #DVT prophylaxis   - heparin gtt held iso H/H drop     ID:  #Sepsis  #PNA  Pseudomonas in sputum, zosyn resistant. Also Pseudomonas in Ucx  - levaquin renally dosed 750 q48h (3/9-3/16) to complete 7d course      SKIN:  #Lines:  2x PIV    Ethics:  - Full Code  - Contact: Sister Ester [phone] MICU DOWN GRADE NOTE    Bed: RCU 13  Provider: Dr. Acosta    Patient is a 69y old  Male who presents with a chief complaint of Acute on chronic hypoxemic respiratory failure (13 Mar 2025 08:09)    For follow up:  []PEG evaluation when stable  []Restart BB if pt goes into Afib again  []Levaquin to finish on 3/16 (7 day course)    MICU Course:  Transferred to MICU on 2/23 after RRT on floors due to hypoxia even with max setting AVAPS. Patient started on midodrine and weaned off of levo on 2/24. Failed pressure support on 2/25 and started diuresis with Bumex. CTH was unremarkable and weaned off of precedex on 3/1. On 3/4, patient extubated and GOC conversations with family revealed that they would want patient to be trached. On 3/7, patient found to have RLL consolidation on POCUS and started on Zosyn, intubated on 3/8 due to increased lethargy. Trach placed on 3/12 AM and patient made NPO for PEG. Transferred to RCU on 3/13.      REVIEW OF SYSTEMS:  CONSTITUTIONAL: No fever, chills  HEENT:  No blurry vision No sinus or throat pain  NECK: No pain or stiffness  RESPIRATORY: No cough, wheezing, chills or hemoptysis; No shortness of breath  CARDIOVASCULAR: No chest pain, palpitations  GASTROINTESTINAL: No abdominal pain. No nausea, vomiting, or diarrhea  GENITOURINARY: No dysuria  NEUROLOGICAL: No HA, No focal weakness  SKIN: No itching, burning, rashes, or lesions   MUSCULOSKELETAL: No joint pain or swelling; No muscle, back, or extremity pain    MEDICATIONS:  albuterol/ipratropium for Nebulization 3 milliLiter(s) Nebulizer every 6 hours  artificial tears (preservative free) Ophthalmic Solution 1 Drop(s) Both EYES every 6 hours PRN  atorvastatin 20 milliGRAM(s) Oral at bedtime  chlorhexidine 0.12% Liquid 15 milliLiter(s) Oral Mucosa every 12 hours  cholecalciferol 1000 Unit(s) Oral daily  epoetin krystyna-epbx (RETACRIT) Injectable 40617 Unit(s) SubCutaneous <User Schedule>  escitalopram 15 milliGRAM(s) Oral with breakfast  gabapentin Solution 150 milliGRAM(s) Oral every 12 hours  HYDROmorphone  Injectable 0.5 milliGRAM(s) IV Push every 4 hours PRN  influenza  Vaccine (HIGH DOSE) 0.5 milliLiter(s) IntraMuscular once  lacosamide IVPB 200 milliGRAM(s) IV Intermittent every 12 hours  lamoTRIgine 50 milliGRAM(s) Oral two times a day  lanolin Ointment 1 Application(s) Topical daily  levETIRAcetam  IVPB 750 milliGRAM(s) IV Intermittent every 12 hours  levoFLOXacin IVPB      levoFLOXacin IVPB 750 milliGRAM(s) IV Intermittent every 48 hours  lurasidone 20 milliGRAM(s) Oral at bedtime  nystatin Powder 1 Application(s) Topical two times a day  pantoprazole  Injectable 40 milliGRAM(s) IV Push daily  polyethylene glycol 3350 17 Gram(s) Oral daily  senna Syrup 10 milliLiter(s) Oral at bedtime  sevelamer carbonate Powder 1600 milliGRAM(s) Oral every 8 hours      T(C): 36.8 (03-13-25 @ 12:00), Max: 36.9 (03-12-25 @ 16:00)  HR: 74 (03-13-25 @ 13:00) (65 - 84)  BP: 119/57 (03-13-25 @ 13:00) (81/41 - 145/68)  RR: 22 (03-13-25 @ 13:00) (16 - 41)  SpO2: 97% (03-13-25 @ 13:00) (93% - 100%)  Wt(kg): --Vital Signs Last 24 Hrs  T(C): 36.8 (13 Mar 2025 12:00), Max: 36.9 (12 Mar 2025 16:00)  T(F): 98.3 (13 Mar 2025 12:00), Max: 98.5 (12 Mar 2025 16:00)  HR: 74 (13 Mar 2025 13:00) (65 - 84)  BP: 119/57 (13 Mar 2025 13:00) (81/41 - 145/68)  BP(mean): 82 (13 Mar 2025 13:00) (58 - 96)  RR: 22 (13 Mar 2025 13:00) (16 - 41)  SpO2: 97% (13 Mar 2025 13:00) (93% - 100%)    Parameters below as of 13 Mar 2025 11:59  Patient On (Oxygen Delivery Method): ventilator        PHYSICAL EXAM:  GENERAL: NAD, well-groomed, well-developed  HEAD:  Atraumatic, Normocephalic  EYES: EOMI, PERRLA, conjunctiva and sclera clear  ENMT:  Moist mucous membranes  NECK: Supple, No JVD,  CHEST/LUNG: Clear to auscultation bilaterally; No rales, rhonchi, wheezing, or rubs  HEART: Regular rate and rhythm; No murmurs, rubs, or gallops  ABDOMEN: Soft, Nontender, Nondistended; Bowel sounds present  NEURO: Alert & Oriented X3  EXTREMITIES: No LE edema, no calf tenderness  LYMPH: No lymphadenopathy noted  SKIN: No rashes or lesions    Consultant(s) Notes Reviewed:  [x ] YES  [ ] NO  Care Discussed with Consultants/Other Providers [ x] YES  [ ] NO    LABS:                        7.9    7.78  )-----------( 130      ( 13 Mar 2025 00:16 )             24.8     03-13    141  |  105  |  58[H]  ----------------------------<  107[H]  4.3   |  21[L]  |  3.06[H]    Ca    9.6      13 Mar 2025 00:16  Phos  4.4     03-13  Mg     2.3     03-13    TPro  6.3  /  Alb  2.7[L]  /  TBili  0.4  /  DBili  x   /  AST  46[H]  /  ALT  38  /  AlkPhos  228[H]  03-13    PT/INR - ( 13 Mar 2025 00:16 )   PT: 12.4 sec;   INR: 1.09 ratio         PTT - ( 13 Mar 2025 00:16 )  PTT:22.9 sec  Urinalysis Basic - ( 13 Mar 2025 00:16 )    Color: x / Appearance: x / SG: x / pH: x  Gluc: 107 mg/dL / Ketone: x  / Bili: x / Urobili: x   Blood: x / Protein: x / Nitrite: x   Leuk Esterase: x / RBC: x / WBC x   Sq Epi: x / Non Sq Epi: x / Bacteria: x      CAPILLARY BLOOD GLUCOSE      POCT Blood Glucose.: 116 mg/dL (13 Mar 2025 06:46)  POCT Blood Glucose.: 111 mg/dL (12 Mar 2025 19:56)      ABG - ( 13 Mar 2025 00:21 )  pH, Arterial: 7.35  pH, Blood: x     /  pCO2: 42    /  pO2: 112   / HCO3: 23    / Base Excess: -2.3  /  SaO2: 99.4              Urinalysis Basic - ( 13 Mar 2025 00:16 )    Color: x / Appearance: x / SG: x / pH: x  Gluc: 107 mg/dL / Ketone: x  / Bili: x / Urobili: x   Blood: x / Protein: x / Nitrite: x   Leuk Esterase: x / RBC: x / WBC x   Sq Epi: x / Non Sq Epi: x / Bacteria: x        RADIOLOGY & ADDITIONAL TESTS:    Imaging Personally Reviewed:  [x ] YES  [ ] NO    Assessment and Plan  	  69 year old male with a past medical history of hypertension, hyperlipemia VAZQUEZ (on CPAP at bedtime, NC 2 liters during the day), CKD stage 3, epilepsy, schizophrenia, developmental delay, metastatic testicular cancer (s/p orchiectomy, actively on chemotherapy, last dose of etoposide/cisplatin on 6/2024), presenting with acute on chronic hypoxemic respiratory failure, secondary to (a) COVID-19 infection, (b) superimposed pneumonia after recent influenza infection, and/or (c) fluid overload. MICU consulted and accepted after RRT due to increased work of breathing. Now s/p trach on 3/13.     NEURO:  #Mental status:  - hx of developmental delay  - Worsening mental status 3/8, likely iso respiratory distress  - Re-intubated 3/8, sedated with fent and prop    #Epilepsy:  - Continue with home meds which include Lacosamide, Lurasidone, Lamotrigine - increased to 50 BID  - EEG negative    #Brain Mets:  - MRI brain now with 5.4 mm enhancing lesion in the LEFT middle cerebellar peduncle with associated edema suspicious for metastases    # Schizophrenia  - Lurasidone halved to reduce oversedation    CV:  #Afib w/RVR  - New onset Afib RVR (on tele, confirmed with EKG 2/19)  - CHADSVASC 2   - off Heparin drip iso fluctuating H/H. given drop in H/H, patient is at increased risk of bleed. Will perform OPHELIA to clear LYNN. If clear, will hold AC moving forward   - restart BB if rates remain uncontrolled    #AHRF  - Likely 2/2 PNA, does not appear to CHF decompensation  - worsening secretions and consolidations on 3/8, iso fever/leukocytosis may be new PNA. Management as below  - Now s/p trach 3/12    RENAL:  #SHAGUFTA on CKD3  - Hx of CKD stage 3, Cr 2.38 at baseline, peak at 4.24  - appreciate nephro input   - continue Renvela and retacrit    GI:  #PEG eval  - plan for PEG once more hemodynamically stable and persistently off pressors     #Diet: Tube feeds via OG tube  #Bowel Regimen  - On Senna and Miralax    ENDO:  No acute issues  Thyroid nodule noted in prior notes    HEMATOLOGIC:  #Anemia  - CKD vs. cancer vs. sepsis  - 1 unit PRBCs given  - CTM CBC  - No sign of bleeding  - retacrit 10,000 tiw sq    #DVT prophylaxis   - heparin gtt held iso H/H drop     ID:  #Sepsis  #PNA  Pseudomonas in sputum, zosyn resistant. Also Pseudomonas in Ucx  - levaquin renally dosed 750 q48h (3/9-3/16) to complete 7d course      SKIN:  #Lines:  2x PIV    Ethics:  - Full Code  - Contact: Sister Ester [phone] MICU DOWN GRADE NOTE    Bed: RCU 13  Provider: Dr. Acosta    Patient is a 69y old  Male who presents with a chief complaint of Acute on chronic hypoxemic respiratory failure (13 Mar 2025 08:09)    For follow up:  []PEG evaluation when stable. Tentatively planned for 3/14  []Restart BB if pt goes into Afib again  []Levaquin to finish on 3/16 (7 day course)  []shared decision making for decision to start AC for new onset AF this admission. Of note, patient with drop in H/H once heparin gtt was started so he is high bleeding risk    MICU Course:  Transferred to MICU on 2/23 after RRT on floors due to hypoxia even with max setting AVAPS. Patient started on midodrine and weaned off of levo on 2/24. Failed pressure support on 2/25 and started diuresis with Bumex. CTH was unremarkable and weaned off of precedex on 3/1. On 3/4, patient extubated and GOC conversations with family revealed that they would want patient to be trached. On 3/7, patient found to have RLL consolidation on POCUS and started on Zosyn, intubated on 3/8 due to increased lethargy. Trach placed on 3/12 AM and patient made NPO for PEG. Transferred to RCU on 3/13.      REVIEW OF SYSTEMS:  CONSTITUTIONAL: No fever, chills  HEENT:  No blurry vision No sinus or throat pain  NECK: No pain or stiffness  RESPIRATORY: No cough, wheezing, chills or hemoptysis; No shortness of breath  CARDIOVASCULAR: No chest pain, palpitations  GASTROINTESTINAL: No abdominal pain. No nausea, vomiting, or diarrhea  GENITOURINARY: No dysuria  NEUROLOGICAL: No HA, No focal weakness  SKIN: No itching, burning, rashes, or lesions   MUSCULOSKELETAL: No joint pain or swelling; No muscle, back, or extremity pain    MEDICATIONS:  albuterol/ipratropium for Nebulization 3 milliLiter(s) Nebulizer every 6 hours  artificial tears (preservative free) Ophthalmic Solution 1 Drop(s) Both EYES every 6 hours PRN  atorvastatin 20 milliGRAM(s) Oral at bedtime  chlorhexidine 0.12% Liquid 15 milliLiter(s) Oral Mucosa every 12 hours  cholecalciferol 1000 Unit(s) Oral daily  epoetin krystyna-epbx (RETACRIT) Injectable 90521 Unit(s) SubCutaneous <User Schedule>  escitalopram 15 milliGRAM(s) Oral with breakfast  gabapentin Solution 150 milliGRAM(s) Oral every 12 hours  HYDROmorphone  Injectable 0.5 milliGRAM(s) IV Push every 4 hours PRN  influenza  Vaccine (HIGH DOSE) 0.5 milliLiter(s) IntraMuscular once  lacosamide IVPB 200 milliGRAM(s) IV Intermittent every 12 hours  lamoTRIgine 50 milliGRAM(s) Oral two times a day  lanolin Ointment 1 Application(s) Topical daily  levETIRAcetam  IVPB 750 milliGRAM(s) IV Intermittent every 12 hours  levoFLOXacin IVPB      levoFLOXacin IVPB 750 milliGRAM(s) IV Intermittent every 48 hours  lurasidone 20 milliGRAM(s) Oral at bedtime  nystatin Powder 1 Application(s) Topical two times a day  pantoprazole  Injectable 40 milliGRAM(s) IV Push daily  polyethylene glycol 3350 17 Gram(s) Oral daily  senna Syrup 10 milliLiter(s) Oral at bedtime  sevelamer carbonate Powder 1600 milliGRAM(s) Oral every 8 hours      T(C): 36.8 (03-13-25 @ 12:00), Max: 36.9 (03-12-25 @ 16:00)  HR: 74 (03-13-25 @ 13:00) (65 - 84)  BP: 119/57 (03-13-25 @ 13:00) (81/41 - 145/68)  RR: 22 (03-13-25 @ 13:00) (16 - 41)  SpO2: 97% (03-13-25 @ 13:00) (93% - 100%)  Wt(kg): --Vital Signs Last 24 Hrs  T(C): 36.8 (13 Mar 2025 12:00), Max: 36.9 (12 Mar 2025 16:00)  T(F): 98.3 (13 Mar 2025 12:00), Max: 98.5 (12 Mar 2025 16:00)  HR: 74 (13 Mar 2025 13:00) (65 - 84)  BP: 119/57 (13 Mar 2025 13:00) (81/41 - 145/68)  BP(mean): 82 (13 Mar 2025 13:00) (58 - 96)  RR: 22 (13 Mar 2025 13:00) (16 - 41)  SpO2: 97% (13 Mar 2025 13:00) (93% - 100%)    Parameters below as of 13 Mar 2025 11:59  Patient On (Oxygen Delivery Method): ventilator        PHYSICAL EXAM:  GENERAL: NAD, well-groomed, well-developed  HEAD:  Atraumatic, Normocephalic  EYES: EOMI, PERRLA, conjunctiva and sclera clear  ENMT:  Moist mucous membranes  NECK: Supple, No JVD,  CHEST/LUNG: Clear to auscultation bilaterally; No rales, rhonchi, wheezing, or rubs  HEART: Regular rate and rhythm; No murmurs, rubs, or gallops  ABDOMEN: Soft, Nontender, Nondistended; Bowel sounds present  NEURO: Alert & Oriented X3  EXTREMITIES: No LE edema, no calf tenderness  LYMPH: No lymphadenopathy noted  SKIN: No rashes or lesions    Consultant(s) Notes Reviewed:  [x ] YES  [ ] NO  Care Discussed with Consultants/Other Providers [ x] YES  [ ] NO    LABS:                        7.9    7.78  )-----------( 130      ( 13 Mar 2025 00:16 )             24.8     03-13    141  |  105  |  58[H]  ----------------------------<  107[H]  4.3   |  21[L]  |  3.06[H]    Ca    9.6      13 Mar 2025 00:16  Phos  4.4     03-13  Mg     2.3     03-13    TPro  6.3  /  Alb  2.7[L]  /  TBili  0.4  /  DBili  x   /  AST  46[H]  /  ALT  38  /  AlkPhos  228[H]  03-13    PT/INR - ( 13 Mar 2025 00:16 )   PT: 12.4 sec;   INR: 1.09 ratio         PTT - ( 13 Mar 2025 00:16 )  PTT:22.9 sec  Urinalysis Basic - ( 13 Mar 2025 00:16 )    Color: x / Appearance: x / SG: x / pH: x  Gluc: 107 mg/dL / Ketone: x  / Bili: x / Urobili: x   Blood: x / Protein: x / Nitrite: x   Leuk Esterase: x / RBC: x / WBC x   Sq Epi: x / Non Sq Epi: x / Bacteria: x      CAPILLARY BLOOD GLUCOSE      POCT Blood Glucose.: 116 mg/dL (13 Mar 2025 06:46)  POCT Blood Glucose.: 111 mg/dL (12 Mar 2025 19:56)      ABG - ( 13 Mar 2025 00:21 )  pH, Arterial: 7.35  pH, Blood: x     /  pCO2: 42    /  pO2: 112   / HCO3: 23    / Base Excess: -2.3  /  SaO2: 99.4              Urinalysis Basic - ( 13 Mar 2025 00:16 )    Color: x / Appearance: x / SG: x / pH: x  Gluc: 107 mg/dL / Ketone: x  / Bili: x / Urobili: x   Blood: x / Protein: x / Nitrite: x   Leuk Esterase: x / RBC: x / WBC x   Sq Epi: x / Non Sq Epi: x / Bacteria: x        RADIOLOGY & ADDITIONAL TESTS:    Imaging Personally Reviewed:  [x ] YES  [ ] NO    Assessment and Plan  	  69 year old male with a past medical history of hypertension, hyperlipemia VAZQUEZ (on CPAP at bedtime, NC 2 liters during the day), CKD stage 3, epilepsy, schizophrenia, developmental delay, metastatic testicular cancer (s/p orchiectomy, actively on chemotherapy, last dose of etoposide/cisplatin on 6/2024), presenting with acute on chronic hypoxemic respiratory failure, secondary to (a) COVID-19 infection, (b) superimposed pneumonia after recent influenza infection, and/or (c) fluid overload. MICU consulted and accepted after RRT due to increased work of breathing. Now s/p trach on 3/13.     NEURO:  #Mental status:  - hx of developmental delay  - Worsening mental status 3/8, likely iso respiratory distress  - Re-intubated 3/8, sedated with fent and prop    #Epilepsy:  - Continue with home meds which include Lacosamide, Lurasidone, Lamotrigine - increased to 50 BID  - EEG negative    #Brain Mets:  - MRI brain now with 5.4 mm enhancing lesion in the LEFT middle cerebellar peduncle with associated edema suspicious for metastases    # Schizophrenia  - Lurasidone halved to reduce oversedation    CV:  #Afib w/RVR  - New onset Afib RVR (on tele, confirmed with EKG 2/19)  - CHADSVASC 2   - off Heparin drip iso fluctuating H/H. given drop in H/H, patient is at increased risk of bleed. Will perform OPHELIA to clear LYNN. If clear, will hold AC moving forward   - restart BB if rates remain uncontrolled    #AHRF  - Likely 2/2 PNA, does not appear to CHF decompensation  - worsening secretions and consolidations on 3/8, iso fever/leukocytosis may be new PNA. Management as below  - Now s/p trach 3/12    RENAL:  #SHAGUFTA on CKD3  - Hx of CKD stage 3, Cr 2.38 at baseline, peak at 4.24  - appreciate nephro input   - continue Renvela and retacrit    GI:  #PEG eval  - plan for PEG once more hemodynamically stable and persistently off pressors     #Diet: Tube feeds via OG tube  #Bowel Regimen  - On Senna and Miralax    ENDO:  No acute issues  Thyroid nodule noted in prior notes    HEMATOLOGIC:  #Anemia  - CKD vs. cancer vs. sepsis  - 1 unit PRBCs given  - CTM CBC  - No sign of bleeding  - retacrit 10,000 tiw sq    #DVT prophylaxis   - heparin gtt held iso H/H drop     ID:  #Sepsis  #PNA  Pseudomonas in sputum, zosyn resistant. Also Pseudomonas in Ucx  - levaquin renally dosed 750 q48h (3/9-3/16) to complete 7d course      SKIN:  #Lines:  2x PIV    Ethics:  - Full Code  - Contact: Sister Ester [phone] MICU DOWN GRADE NOTE    Bed: RCU 13  Provider: Dr. Acosta    Patient is a 69y old  Male who presents with a chief complaint of Acute on chronic hypoxemic respiratory failure (13 Mar 2025 08:09)    For follow up:  []PEG evaluation when stable. Tentatively planned for 3/14  []Restart BB if pt goes into Afib again  []Levaquin to finish on 3/16 (7 day course)  []shared decision making for decision to start AC for new onset AF this admission. Of note, patient with drop in H/H once heparin gtt was started so he is high bleeding risk    MICU Course:  Transferred to MICU on 2/23 after RRT on floors due to hypoxia even with max setting AVAPS. Patient started on midodrine and weaned off of levo on 2/24. Failed pressure support on 2/25 and started diuresis with Bumex. CTH was unremarkable and weaned off of precedex on 3/1. On 3/4, patient extubated and GOC conversations with family revealed that they would want patient to be trached. On 3/7, patient found to have RLL consolidation on POCUS and started on Zosyn, intubated on 3/8 due to increased lethargy and inability to clear oral secretions. Trach placed on 3/12 AM and patient made NPO for PEG. Transferred to RCU on 3/13.      REVIEW OF SYSTEMS:  CONSTITUTIONAL: No fever, chills  HEENT:  No blurry vision No sinus or throat pain  NECK: No pain or stiffness  RESPIRATORY: No cough, wheezing, chills or hemoptysis; No shortness of breath  CARDIOVASCULAR: No chest pain, palpitations  GASTROINTESTINAL: No abdominal pain. No nausea, vomiting, or diarrhea  GENITOURINARY: No dysuria  NEUROLOGICAL: No HA, No focal weakness  SKIN: No itching, burning, rashes, or lesions   MUSCULOSKELETAL: No joint pain or swelling; No muscle, back, or extremity pain    MEDICATIONS:  albuterol/ipratropium for Nebulization 3 milliLiter(s) Nebulizer every 6 hours  artificial tears (preservative free) Ophthalmic Solution 1 Drop(s) Both EYES every 6 hours PRN  atorvastatin 20 milliGRAM(s) Oral at bedtime  chlorhexidine 0.12% Liquid 15 milliLiter(s) Oral Mucosa every 12 hours  cholecalciferol 1000 Unit(s) Oral daily  epoetin krystyna-epbx (RETACRIT) Injectable 79549 Unit(s) SubCutaneous <User Schedule>  escitalopram 15 milliGRAM(s) Oral with breakfast  gabapentin Solution 150 milliGRAM(s) Oral every 12 hours  HYDROmorphone  Injectable 0.5 milliGRAM(s) IV Push every 4 hours PRN  influenza  Vaccine (HIGH DOSE) 0.5 milliLiter(s) IntraMuscular once  lacosamide IVPB 200 milliGRAM(s) IV Intermittent every 12 hours  lamoTRIgine 50 milliGRAM(s) Oral two times a day  lanolin Ointment 1 Application(s) Topical daily  levETIRAcetam  IVPB 750 milliGRAM(s) IV Intermittent every 12 hours  levoFLOXacin IVPB      levoFLOXacin IVPB 750 milliGRAM(s) IV Intermittent every 48 hours  lurasidone 20 milliGRAM(s) Oral at bedtime  nystatin Powder 1 Application(s) Topical two times a day  pantoprazole  Injectable 40 milliGRAM(s) IV Push daily  polyethylene glycol 3350 17 Gram(s) Oral daily  senna Syrup 10 milliLiter(s) Oral at bedtime  sevelamer carbonate Powder 1600 milliGRAM(s) Oral every 8 hours      T(C): 36.8 (03-13-25 @ 12:00), Max: 36.9 (03-12-25 @ 16:00)  HR: 74 (03-13-25 @ 13:00) (65 - 84)  BP: 119/57 (03-13-25 @ 13:00) (81/41 - 145/68)  RR: 22 (03-13-25 @ 13:00) (16 - 41)  SpO2: 97% (03-13-25 @ 13:00) (93% - 100%)  Wt(kg): --Vital Signs Last 24 Hrs  T(C): 36.8 (13 Mar 2025 12:00), Max: 36.9 (12 Mar 2025 16:00)  T(F): 98.3 (13 Mar 2025 12:00), Max: 98.5 (12 Mar 2025 16:00)  HR: 74 (13 Mar 2025 13:00) (65 - 84)  BP: 119/57 (13 Mar 2025 13:00) (81/41 - 145/68)  BP(mean): 82 (13 Mar 2025 13:00) (58 - 96)  RR: 22 (13 Mar 2025 13:00) (16 - 41)  SpO2: 97% (13 Mar 2025 13:00) (93% - 100%)    Parameters below as of 13 Mar 2025 11:59  Patient On (Oxygen Delivery Method): ventilator        PHYSICAL EXAM:  GENERAL: NAD, well-groomed, well-developed  HEAD:  Atraumatic, Normocephalic  EYES: EOMI, PERRLA, conjunctiva and sclera clear  ENMT:  Moist mucous membranes  NECK: Supple, No JVD,  CHEST/LUNG: Clear to auscultation bilaterally; No rales, rhonchi, wheezing, or rubs  HEART: Regular rate and rhythm; No murmurs, rubs, or gallops  ABDOMEN: Soft, Nontender, Nondistended; Bowel sounds present  NEURO: Alert & Oriented X3  EXTREMITIES: No LE edema, no calf tenderness  LYMPH: No lymphadenopathy noted  SKIN: No rashes or lesions    Consultant(s) Notes Reviewed:  [x ] YES  [ ] NO  Care Discussed with Consultants/Other Providers [ x] YES  [ ] NO    LABS:                        7.9    7.78  )-----------( 130      ( 13 Mar 2025 00:16 )             24.8     03-13    141  |  105  |  58[H]  ----------------------------<  107[H]  4.3   |  21[L]  |  3.06[H]    Ca    9.6      13 Mar 2025 00:16  Phos  4.4     03-13  Mg     2.3     03-13    TPro  6.3  /  Alb  2.7[L]  /  TBili  0.4  /  DBili  x   /  AST  46[H]  /  ALT  38  /  AlkPhos  228[H]  03-13    PT/INR - ( 13 Mar 2025 00:16 )   PT: 12.4 sec;   INR: 1.09 ratio         PTT - ( 13 Mar 2025 00:16 )  PTT:22.9 sec  Urinalysis Basic - ( 13 Mar 2025 00:16 )    Color: x / Appearance: x / SG: x / pH: x  Gluc: 107 mg/dL / Ketone: x  / Bili: x / Urobili: x   Blood: x / Protein: x / Nitrite: x   Leuk Esterase: x / RBC: x / WBC x   Sq Epi: x / Non Sq Epi: x / Bacteria: x      CAPILLARY BLOOD GLUCOSE      POCT Blood Glucose.: 116 mg/dL (13 Mar 2025 06:46)  POCT Blood Glucose.: 111 mg/dL (12 Mar 2025 19:56)      ABG - ( 13 Mar 2025 00:21 )  pH, Arterial: 7.35  pH, Blood: x     /  pCO2: 42    /  pO2: 112   / HCO3: 23    / Base Excess: -2.3  /  SaO2: 99.4              Urinalysis Basic - ( 13 Mar 2025 00:16 )    Color: x / Appearance: x / SG: x / pH: x  Gluc: 107 mg/dL / Ketone: x  / Bili: x / Urobili: x   Blood: x / Protein: x / Nitrite: x   Leuk Esterase: x / RBC: x / WBC x   Sq Epi: x / Non Sq Epi: x / Bacteria: x        RADIOLOGY & ADDITIONAL TESTS:    Imaging Personally Reviewed:  [x ] YES  [ ] NO    Assessment and Plan  	  69 year old male with a past medical history of hypertension, hyperlipemia VAZQUEZ (on CPAP at bedtime, NC 2 liters during the day), CKD stage 3, epilepsy, schizophrenia, developmental delay, metastatic testicular cancer (s/p orchiectomy, actively on chemotherapy, last dose of etoposide/cisplatin on 6/2024), presenting with acute on chronic hypoxemic respiratory failure, secondary to (a) COVID-19 infection, (b) superimposed pneumonia after recent influenza infection, and/or (c) fluid overload. MICU consulted and accepted after RRT due to increased work of breathing. Now s/p trach on 3/13.     NEURO:  #Mental status:  - hx of developmental delay  - Worsening mental status 3/8, likely iso respiratory distress  - Re-intubated 3/8, sedated with fent and prop    #Epilepsy:  - Continue with home meds which include Lacosamide, Lurasidone, Lamotrigine - increased to 50 BID  - EEG negative    #Brain Mets:  - MRI brain now with 5.4 mm enhancing lesion in the LEFT middle cerebellar peduncle with associated edema suspicious for metastases    # Schizophrenia  - Lurasidone halved to reduce oversedation    CV:  #Afib w/RVR  - New onset Afib RVR (on tele, confirmed with EKG 2/19)  - CHADSVASC 2   - off Heparin drip iso fluctuating H/H. given drop in H/H, patient is at increased risk of bleed. Will perform OPHELIA to clear LYNN. If clear, will hold AC moving forward   - restart BB if rates remain uncontrolled    #AHRF  - Likely 2/2 PNA, does not appear to CHF decompensation  - worsening secretions and consolidations on 3/8, iso fever/leukocytosis may be new PNA. Management as below  - Now s/p trach 3/12    RENAL:  #SHAGUFTA on CKD3  - Hx of CKD stage 3, Cr 2.38 at baseline, peak at 4.24  - appreciate nephro input   - continue Renvela and retacrit    GI:  #PEG eval  - plan for PEG once more hemodynamically stable and persistently off pressors     #Diet: Tube feeds via OG tube  #Bowel Regimen  - On Senna and Miralax    ENDO:  No acute issues  Thyroid nodule noted in prior notes    HEMATOLOGIC:  #Anemia  - CKD vs. cancer vs. sepsis  - 1 unit PRBCs given  - CTM CBC  - No sign of bleeding  - retacrit 10,000 tiw sq    #DVT prophylaxis   - heparin gtt held iso H/H drop     ID:  #Sepsis  #PNA  Pseudomonas in sputum, zosyn resistant. Also Pseudomonas in Ucx  - levaquin renally dosed 750 q48h (3/9-3/16) to complete 7d course      SKIN:  #Lines:  2x PIV    Ethics:  - Full Code  - Contact: Sister Ester [phone] MICU DOWN GRADE NOTE    Bed: RCU 13  Provider: Dr. Acosta  Signout given to: Darleen Weber    Patient is a 69y old  Male who presents with a chief complaint of Acute on chronic hypoxemic respiratory failure (13 Mar 2025 08:09)    For follow up:  []PEG evaluation when stable. Tentatively planned for 3/14  []Restart BB if pt goes into Afib again  []Levaquin to finish on 3/16 (7 day course)  []shared decision making for decision to start AC for new onset AF this admission. Of note, patient with drop in H/H once heparin gtt was started so he is high bleeding risk    MICU Course:  Transferred to MICU on 2/23 after RRT on floors due to hypoxia even with max setting AVAPS. Patient started on midodrine and weaned off of levo on 2/24. Failed pressure support on 2/25 and started diuresis with Bumex. CTH was unremarkable and weaned off of precedex on 3/1. On 3/4, patient extubated and GOC conversations with family revealed that they would want patient to be trached. On 3/7, patient found to have RLL consolidation on POCUS and started on Zosyn, intubated on 3/8 due to increased lethargy and inability to clear oral secretions. Trach placed on 3/12 AM and patient made NPO for PEG. Transferred to RCU on 3/13.      REVIEW OF SYSTEMS:  CONSTITUTIONAL: No fever, chills  HEENT:  No blurry vision No sinus or throat pain  NECK: No pain or stiffness  RESPIRATORY: No cough, wheezing, chills or hemoptysis; No shortness of breath  CARDIOVASCULAR: No chest pain, palpitations  GASTROINTESTINAL: No abdominal pain. No nausea, vomiting, or diarrhea  GENITOURINARY: No dysuria  NEUROLOGICAL: No HA, No focal weakness  SKIN: No itching, burning, rashes, or lesions   MUSCULOSKELETAL: No joint pain or swelling; No muscle, back, or extremity pain    MEDICATIONS:  albuterol/ipratropium for Nebulization 3 milliLiter(s) Nebulizer every 6 hours  artificial tears (preservative free) Ophthalmic Solution 1 Drop(s) Both EYES every 6 hours PRN  atorvastatin 20 milliGRAM(s) Oral at bedtime  chlorhexidine 0.12% Liquid 15 milliLiter(s) Oral Mucosa every 12 hours  cholecalciferol 1000 Unit(s) Oral daily  epoetin krystyna-epbx (RETACRIT) Injectable 18209 Unit(s) SubCutaneous <User Schedule>  escitalopram 15 milliGRAM(s) Oral with breakfast  gabapentin Solution 150 milliGRAM(s) Oral every 12 hours  HYDROmorphone  Injectable 0.5 milliGRAM(s) IV Push every 4 hours PRN  influenza  Vaccine (HIGH DOSE) 0.5 milliLiter(s) IntraMuscular once  lacosamide IVPB 200 milliGRAM(s) IV Intermittent every 12 hours  lamoTRIgine 50 milliGRAM(s) Oral two times a day  lanolin Ointment 1 Application(s) Topical daily  levETIRAcetam  IVPB 750 milliGRAM(s) IV Intermittent every 12 hours  levoFLOXacin IVPB      levoFLOXacin IVPB 750 milliGRAM(s) IV Intermittent every 48 hours  lurasidone 20 milliGRAM(s) Oral at bedtime  nystatin Powder 1 Application(s) Topical two times a day  pantoprazole  Injectable 40 milliGRAM(s) IV Push daily  polyethylene glycol 3350 17 Gram(s) Oral daily  senna Syrup 10 milliLiter(s) Oral at bedtime  sevelamer carbonate Powder 1600 milliGRAM(s) Oral every 8 hours      T(C): 36.8 (03-13-25 @ 12:00), Max: 36.9 (03-12-25 @ 16:00)  HR: 74 (03-13-25 @ 13:00) (65 - 84)  BP: 119/57 (03-13-25 @ 13:00) (81/41 - 145/68)  RR: 22 (03-13-25 @ 13:00) (16 - 41)  SpO2: 97% (03-13-25 @ 13:00) (93% - 100%)  Wt(kg): --Vital Signs Last 24 Hrs  T(C): 36.8 (13 Mar 2025 12:00), Max: 36.9 (12 Mar 2025 16:00)  T(F): 98.3 (13 Mar 2025 12:00), Max: 98.5 (12 Mar 2025 16:00)  HR: 74 (13 Mar 2025 13:00) (65 - 84)  BP: 119/57 (13 Mar 2025 13:00) (81/41 - 145/68)  BP(mean): 82 (13 Mar 2025 13:00) (58 - 96)  RR: 22 (13 Mar 2025 13:00) (16 - 41)  SpO2: 97% (13 Mar 2025 13:00) (93% - 100%)    Parameters below as of 13 Mar 2025 11:59  Patient On (Oxygen Delivery Method): ventilator        PHYSICAL EXAM:  GENERAL: NAD, well-groomed, well-developed  HEAD:  Atraumatic, Normocephalic  EYES: EOMI, PERRLA, conjunctiva and sclera clear  ENMT:  Moist mucous membranes  NECK: Supple, No JVD,  CHEST/LUNG: Clear to auscultation bilaterally; No rales, rhonchi, wheezing, or rubs  HEART: Regular rate and rhythm; No murmurs, rubs, or gallops  ABDOMEN: Soft, Nontender, Nondistended; Bowel sounds present  NEURO: Alert & Oriented X3  EXTREMITIES: No LE edema, no calf tenderness  LYMPH: No lymphadenopathy noted  SKIN: No rashes or lesions    Consultant(s) Notes Reviewed:  [x ] YES  [ ] NO  Care Discussed with Consultants/Other Providers [ x] YES  [ ] NO    LABS:                        7.9    7.78  )-----------( 130      ( 13 Mar 2025 00:16 )             24.8     03-13    141  |  105  |  58[H]  ----------------------------<  107[H]  4.3   |  21[L]  |  3.06[H]    Ca    9.6      13 Mar 2025 00:16  Phos  4.4     03-13  Mg     2.3     03-13    TPro  6.3  /  Alb  2.7[L]  /  TBili  0.4  /  DBili  x   /  AST  46[H]  /  ALT  38  /  AlkPhos  228[H]  03-13    PT/INR - ( 13 Mar 2025 00:16 )   PT: 12.4 sec;   INR: 1.09 ratio         PTT - ( 13 Mar 2025 00:16 )  PTT:22.9 sec  Urinalysis Basic - ( 13 Mar 2025 00:16 )    Color: x / Appearance: x / SG: x / pH: x  Gluc: 107 mg/dL / Ketone: x  / Bili: x / Urobili: x   Blood: x / Protein: x / Nitrite: x   Leuk Esterase: x / RBC: x / WBC x   Sq Epi: x / Non Sq Epi: x / Bacteria: x      CAPILLARY BLOOD GLUCOSE      POCT Blood Glucose.: 116 mg/dL (13 Mar 2025 06:46)  POCT Blood Glucose.: 111 mg/dL (12 Mar 2025 19:56)      ABG - ( 13 Mar 2025 00:21 )  pH, Arterial: 7.35  pH, Blood: x     /  pCO2: 42    /  pO2: 112   / HCO3: 23    / Base Excess: -2.3  /  SaO2: 99.4              Urinalysis Basic - ( 13 Mar 2025 00:16 )    Color: x / Appearance: x / SG: x / pH: x  Gluc: 107 mg/dL / Ketone: x  / Bili: x / Urobili: x   Blood: x / Protein: x / Nitrite: x   Leuk Esterase: x / RBC: x / WBC x   Sq Epi: x / Non Sq Epi: x / Bacteria: x        RADIOLOGY & ADDITIONAL TESTS:    Imaging Personally Reviewed:  [x ] YES  [ ] NO    Assessment and Plan  	  69 year old male with a past medical history of hypertension, hyperlipemia VAZQUEZ (on CPAP at bedtime, NC 2 liters during the day), CKD stage 3, epilepsy, schizophrenia, developmental delay, metastatic testicular cancer (s/p orchiectomy, actively on chemotherapy, last dose of etoposide/cisplatin on 6/2024), presenting with acute on chronic hypoxemic respiratory failure, secondary to (a) COVID-19 infection, (b) superimposed pneumonia after recent influenza infection, and/or (c) fluid overload. MICU consulted and accepted after RRT due to increased work of breathing. Now s/p trach on 3/13.     NEURO:  #Mental status:  - hx of developmental delay  - Worsening mental status 3/8, likely iso respiratory distress  - Re-intubated 3/8, sedated with fent and prop    #Epilepsy:  - Continue with home meds which include Lacosamide, Lurasidone, Lamotrigine - increased to 50 BID  - EEG negative    #Brain Mets:  - MRI brain now with 5.4 mm enhancing lesion in the LEFT middle cerebellar peduncle with associated edema suspicious for metastases    # Schizophrenia  - Lurasidone halved to reduce oversedation    CV:  #Afib w/RVR  - New onset Afib RVR (on tele, confirmed with EKG 2/19)  - CHADSVASC 2   - off Heparin drip iso fluctuating H/H. given drop in H/H, patient is at increased risk of bleed. Will perform OPHELIA to clear LYNN. If clear, will hold AC moving forward   - restart BB if rates remain uncontrolled    #AHRF  - Likely 2/2 PNA, does not appear to CHF decompensation  - worsening secretions and consolidations on 3/8, iso fever/leukocytosis may be new PNA. Management as below  - Now s/p trach 3/12    RENAL:  #SHAGUFTA on CKD3  - Hx of CKD stage 3, Cr 2.38 at baseline, peak at 4.24  - appreciate nephro input   - continue Renvela and retacrit    GI:  #PEG eval  - plan for PEG once more hemodynamically stable and persistently off pressors     #Diet: Tube feeds via OG tube  #Bowel Regimen  - On Senna and Miralax    ENDO:  No acute issues  Thyroid nodule noted in prior notes    HEMATOLOGIC:  #Anemia  - CKD vs. cancer vs. sepsis  - 1 unit PRBCs given  - CTM CBC  - No sign of bleeding  - retacrit 10,000 tiw sq    #DVT prophylaxis   - heparin gtt held iso H/H drop     ID:  #Sepsis  #PNA  Pseudomonas in sputum, zosyn resistant. Also Pseudomonas in Ucx  - levaquin renally dosed 750 q48h (3/9-3/16) to complete 7d course      SKIN:  #Lines:  2x PIV    Ethics:  - Full Code  - Contact: Sister Ester [phone]

## 2025-03-14 LAB
ALBUMIN SERPL ELPH-MCNC: 2.9 G/DL — LOW (ref 3.3–5)
ALP SERPL-CCNC: 251 U/L — HIGH (ref 40–120)
ALT FLD-CCNC: 37 U/L — SIGNIFICANT CHANGE UP (ref 10–45)
ANION GAP SERPL CALC-SCNC: 15 MMOL/L — SIGNIFICANT CHANGE UP (ref 5–17)
AST SERPL-CCNC: 50 U/L — HIGH (ref 10–40)
BILIRUB SERPL-MCNC: 0.5 MG/DL — SIGNIFICANT CHANGE UP (ref 0.2–1.2)
BUN SERPL-MCNC: 50 MG/DL — HIGH (ref 7–23)
CALCIUM SERPL-MCNC: 9.8 MG/DL — SIGNIFICANT CHANGE UP (ref 8.4–10.5)
CHLORIDE SERPL-SCNC: 106 MMOL/L — SIGNIFICANT CHANGE UP (ref 96–108)
CO2 SERPL-SCNC: 20 MMOL/L — LOW (ref 22–31)
CREAT SERPL-MCNC: 2.67 MG/DL — HIGH (ref 0.5–1.3)
EGFR: 25 ML/MIN/1.73M2 — LOW
EGFR: 25 ML/MIN/1.73M2 — LOW
GLUCOSE BLDC GLUCOMTR-MCNC: 92 MG/DL — SIGNIFICANT CHANGE UP (ref 70–99)
GLUCOSE BLDC GLUCOMTR-MCNC: 94 MG/DL — SIGNIFICANT CHANGE UP (ref 70–99)
GLUCOSE BLDC GLUCOMTR-MCNC: 99 MG/DL — SIGNIFICANT CHANGE UP (ref 70–99)
GLUCOSE SERPL-MCNC: 93 MG/DL — SIGNIFICANT CHANGE UP (ref 70–99)
HCT VFR BLD CALC: 26 % — LOW (ref 39–50)
HGB BLD-MCNC: 8.1 G/DL — LOW (ref 13–17)
MAGNESIUM SERPL-MCNC: 2 MG/DL — SIGNIFICANT CHANGE UP (ref 1.6–2.6)
MCHC RBC-ENTMCNC: 30.3 PG — SIGNIFICANT CHANGE UP (ref 27–34)
MCHC RBC-ENTMCNC: 31.2 G/DL — LOW (ref 32–36)
MCV RBC AUTO: 97.4 FL — SIGNIFICANT CHANGE UP (ref 80–100)
NRBC BLD AUTO-RTO: 0 /100 WBCS — SIGNIFICANT CHANGE UP (ref 0–0)
PHOSPHATE SERPL-MCNC: 3.2 MG/DL — SIGNIFICANT CHANGE UP (ref 2.5–4.5)
PLATELET # BLD AUTO: 148 K/UL — LOW (ref 150–400)
POTASSIUM SERPL-MCNC: 3.8 MMOL/L — SIGNIFICANT CHANGE UP (ref 3.5–5.3)
POTASSIUM SERPL-SCNC: 3.8 MMOL/L — SIGNIFICANT CHANGE UP (ref 3.5–5.3)
PROT SERPL-MCNC: 6.8 G/DL — SIGNIFICANT CHANGE UP (ref 6–8.3)
RBC # BLD: 2.67 M/UL — LOW (ref 4.2–5.8)
RBC # FLD: 16.6 % — HIGH (ref 10.3–14.5)
SODIUM SERPL-SCNC: 141 MMOL/L — SIGNIFICANT CHANGE UP (ref 135–145)
WBC # BLD: 11.78 K/UL — HIGH (ref 3.8–10.5)
WBC # FLD AUTO: 11.78 K/UL — HIGH (ref 3.8–10.5)

## 2025-03-14 PROCEDURE — 99233 SBSQ HOSP IP/OBS HIGH 50: CPT

## 2025-03-14 PROCEDURE — 93010 ELECTROCARDIOGRAM REPORT: CPT

## 2025-03-14 PROCEDURE — 43246 EGD PLACE GASTROSTOMY TUBE: CPT | Mod: GC

## 2025-03-14 DEVICE — KIT ENDO SAFTEY PEG PULL STD 20 FR ENDOVIVE: Type: IMPLANTABLE DEVICE | Status: FUNCTIONAL

## 2025-03-14 RX ORDER — ACETAMINOPHEN 500 MG/5ML
1000 LIQUID (ML) ORAL ONCE
Refills: 0 | Status: COMPLETED | OUTPATIENT
Start: 2025-03-14 | End: 2025-03-14

## 2025-03-14 RX ADMIN — Medication 15 MILLILITER(S): at 23:53

## 2025-03-14 RX ADMIN — LAMOTRIGINE 50 MILLIGRAM(S): 150 TABLET ORAL at 17:43

## 2025-03-14 RX ADMIN — LAMOTRIGINE 50 MILLIGRAM(S): 150 TABLET ORAL at 05:16

## 2025-03-14 RX ADMIN — Medication 400 MILLIGRAM(S): at 01:01

## 2025-03-14 RX ADMIN — LEVETIRACETAM 400 MILLIGRAM(S): 10 INJECTION, SOLUTION INTRAVENOUS at 05:16

## 2025-03-14 RX ADMIN — Medication 10 MILLILITER(S): at 21:39

## 2025-03-14 RX ADMIN — NYSTATIN 1 APPLICATION(S): 100000 CREAM TOPICAL at 05:17

## 2025-03-14 RX ADMIN — LACOSAMIDE 140 MILLIGRAM(S): 150 TABLET, FILM COATED ORAL at 17:44

## 2025-03-14 RX ADMIN — Medication 15 MILLILITER(S): at 05:15

## 2025-03-14 RX ADMIN — LACOSAMIDE 140 MILLIGRAM(S): 150 TABLET, FILM COATED ORAL at 05:17

## 2025-03-14 RX ADMIN — LEVETIRACETAM 400 MILLIGRAM(S): 10 INJECTION, SOLUTION INTRAVENOUS at 17:44

## 2025-03-14 RX ADMIN — Medication 40 MILLIGRAM(S): at 14:27

## 2025-03-14 RX ADMIN — SEVELAMER HYDROCHLORIDE 1600 MILLIGRAM(S): 800 TABLET ORAL at 14:28

## 2025-03-14 RX ADMIN — LURASIDONE HYDROCHLORIDE 20 MILLIGRAM(S): 120 TABLET, FILM COATED ORAL at 21:39

## 2025-03-14 RX ADMIN — IPRATROPIUM BROMIDE AND ALBUTEROL SULFATE 3 MILLILITER(S): .5; 2.5 SOLUTION RESPIRATORY (INHALATION) at 23:40

## 2025-03-14 RX ADMIN — SEVELAMER HYDROCHLORIDE 1600 MILLIGRAM(S): 800 TABLET ORAL at 21:39

## 2025-03-14 RX ADMIN — GABAPENTIN 150 MILLIGRAM(S): 400 CAPSULE ORAL at 05:17

## 2025-03-14 RX ADMIN — GABAPENTIN 150 MILLIGRAM(S): 400 CAPSULE ORAL at 17:43

## 2025-03-14 RX ADMIN — IPRATROPIUM BROMIDE AND ALBUTEROL SULFATE 3 MILLILITER(S): .5; 2.5 SOLUTION RESPIRATORY (INHALATION) at 05:36

## 2025-03-14 RX ADMIN — NYSTATIN 1 APPLICATION(S): 100000 CREAM TOPICAL at 21:39

## 2025-03-14 RX ADMIN — Medication 1000 MILLIGRAM(S): at 02:05

## 2025-03-14 RX ADMIN — ATORVASTATIN CALCIUM 20 MILLIGRAM(S): 80 TABLET, FILM COATED ORAL at 21:39

## 2025-03-14 RX ADMIN — SEVELAMER HYDROCHLORIDE 1600 MILLIGRAM(S): 800 TABLET ORAL at 05:16

## 2025-03-14 RX ADMIN — IPRATROPIUM BROMIDE AND ALBUTEROL SULFATE 3 MILLILITER(S): .5; 2.5 SOLUTION RESPIRATORY (INHALATION) at 17:40

## 2025-03-14 RX ADMIN — NYSTATIN 1 APPLICATION(S): 100000 CREAM TOPICAL at 14:19

## 2025-03-14 NOTE — PROGRESS NOTE ADULT - ASSESSMENT
69 year old male with a past medical history of hypertension, hyperlipemia VAZQUEZ (on CPAP at bedtime, NC 2 liters during the day), CKD stage 3, epilepsy, schizophrenia, developmental delay, metastatic testicular cancer (s/p orchiectomy, actively on chemotherapy, last dose of etoposide/cisplatin on 6/2024), presenting with acute on chronic hypoxemic respiratory failure, secondary to (a) COVID-19 infection, (b) superimposed pneumonia after recent influenza infection,  Transferred to MICU on 2/23 after RRT on floors due to hypoxia even with max setting AVAPS. Patient started on midodrine and weaned off of levo on 2/24. Failed pressure support on 2/25 and started diuresis with Bumex. CTH was unremarkable and weaned off of Precedex on 3/1. On 3/4 patient extubated and C conversations with family revealed that they would want patient to be trached. On 3/7, patient found to have RLL consolidation on POCUS and started on Zosyn, intubated on 3/8 due to increased lethargy and inability to clear oral secretions. Trach placed on 3/12 AM and patient made NPO for PEG. Transferred to RCU on 3/13.   69 year old male with a past medical history of hypertension, hyperlipemia VAZQUEZ (on CPAP at bedtime, NC 2 liters during the day), CKD stage 3, epilepsy, schizophrenia, developmental delay, metastatic testicular cancer (s/p orchiectomy, actively on chemotherapy, last dose of etoposide/cisplatin on 6/2024), presenting with acute on chronic hypoxemic respiratory failure, secondary to (a) COVID-19 infection, (b) superimposed pneumonia after recent influenza infection,  Transferred to MICU on 2/23 after RRT on floors due to hypoxia even with max setting AVAPS. Patient started on midodrine and weaned off of levo on 2/24. Failed pressure support on 2/25 and started diuresis with Bumex. CTH was unremarkable and weaned off of Precedex on 3/1. On 3/4 patient extubated and [facility] conversations with family revealed that they would want patient to be trached. On 3/7, patient found to have RLL consolidation on POCUS and started on Zosyn, intubated on 3/8 due to increased lethargy and inability to clear oral secretions. Trach placed on 3/12 AM and patient made NPO for PEG. Transferred to RCU on 3/13.    3/14: Kept Npo overnight for Peg placement today. Tolerated procedure well, Endoscopy with normal findings. Cleared for meds only via peg today and Feeds tomorrow when GI see's and clear. BG stable, making urine, net negative ~600ml, keep net negative balance. CR continues to downtrend (2.67), peaked to 4.34 on 3/3. Pseudomonas PNA on + Bal / recurrent PSA UTI on Levaquin q 48hrs thru 3/16. ID following. Sister given full medical update by attending physician today.

## 2025-03-14 NOTE — PROGRESS NOTE ADULT - SUBJECTIVE AND OBJECTIVE BOX
Patient is a 69y old  Male who presents with a chief complaint of Acute on chronic hypoxemic respiratory failure (14 Mar 2025 04:49)      Interval Events:    REVIEW OF SYSTEMS:  [ ] Positive  [ ] All other systems negative  [ ] Unable to assess ROS because ________    Vital Signs Last 24 Hrs  T(C): 37.2 (03-14-25 @ 05:39), Max: 37.5 (03-13-25 @ 18:15)  T(F): 99 (03-14-25 @ 05:39), Max: 99.5 (03-13-25 @ 18:15)  HR: 82 (03-14-25 @ 05:48) (65 - 94)  BP: 155/88 (03-14-25 @ 05:39) (96/50 - 163/91)  RR: 20 (03-14-25 @ 00:00) (19 - 22)  SpO2: 100% (03-14-25 @ 05:48) (95% - 100%)PHYSICAL EXAM:  HEENT:   [ ]Tracheostomy:  [ ]Pupils equal  [ ]No oral lesions  [ ]Abnormal        SKIN  [ ]No Rash  [ ] Abnormal  [ ] pressure    CARDIAC  [ ]Regular  [ ]Abnormal    PULMONARY  [ ]Bilateral Clear Breath Sounds  [ ]Normal Excursion  [ ]Abnormal    GI  [ ]PEG      [ ] +BS		              [ ]Soft, nondistended, nontender	  [ ]Abnormal    MUSCULOSKELETAL                                   [ ]Bedbound                 [ ]Abnormal    [ ]Ambulatory/OOB to chair                           EXTREMITIES                                         [ ]Normal  [ ]Edema                           NEUROLOGIC  [ ] Normal, non focal  [ ] Focal findings:    PSYCHIATRIC  [ ]Alert and appropriate  [ ] Sedated	 [ ]Agitated    :  [place]: [ ] Yes, if yes: Date of Placement:                   [  ] No    LINES: Central Lines [ ] Yes, if yes: Date of Placement                                     [  ] No    HOSPITAL MEDICATIONS:  MEDICATIONS  (STANDING):  albuterol/ipratropium for Nebulization 3 milliLiter(s) Nebulizer every 6 hours  atorvastatin 20 milliGRAM(s) Oral at bedtime  chlorhexidine 0.12% Liquid 15 milliLiter(s) Oral Mucosa every 12 hours  cholecalciferol 1000 Unit(s) Oral daily  epoetin krystyna-epbx (RETACRIT) Injectable 94273 Unit(s) SubCutaneous <User Schedule>  escitalopram 15 milliGRAM(s) Oral with breakfast  gabapentin Solution 150 milliGRAM(s) Oral every 12 hours  influenza  Vaccine (HIGH DOSE) 0.5 milliLiter(s) IntraMuscular once  lacosamide IVPB 200 milliGRAM(s) IV Intermittent every 12 hours  lamoTRIgine 50 milliGRAM(s) Oral two times a day  lanolin Ointment 1 Application(s) Topical daily  levETIRAcetam  IVPB 750 milliGRAM(s) IV Intermittent every 12 hours  levoFLOXacin IVPB      levoFLOXacin IVPB 750 milliGRAM(s) IV Intermittent every 48 hours  lurasidone 20 milliGRAM(s) Oral at bedtime  nystatin Powder 1 Application(s) Topical every 8 hours  pantoprazole  Injectable 40 milliGRAM(s) IV Push daily  polyethylene glycol 3350 17 Gram(s) Oral daily  senna Syrup 10 milliLiter(s) Oral at bedtime  sevelamer carbonate Powder 1600 milliGRAM(s) Oral every 8 hours    MEDICATIONS  (PRN):  acetaminophen     Tablet .. 650 milliGRAM(s) Oral every 6 hours PRN Temp greater or equal to 38C (100.4F), Mild Pain (1 - 3)  aluminum hydroxide/magnesium hydroxide/simethicone Suspension 30 milliLiter(s) Oral every 4 hours PRN Dyspepsia  artificial tears (preservative free) Ophthalmic Solution 1 Drop(s) Both EYES every 6 hours PRN Dry Eyes  melatonin 3 milliGRAM(s) Oral at bedtime PRN Insomnia  ondansetron Injectable 4 milliGRAM(s) IV Push every 8 hours PRN Nausea and/or Vomiting      LABS:                        7.9    7.78  )-----------( 130      ( 13 Mar 2025 00:16 )             24.8     03-13    141  |  105  |  58[H]  ----------------------------<  107[H]  4.3   |  21[L]  |  3.06[H]    Ca    9.6      13 Mar 2025 00:16  Phos  4.4     03-13  Mg     2.3     03-13    TPro  6.3  /  Alb  2.7[L]  /  TBili  0.4  /  DBili  x   /  AST  46[H]  /  ALT  38  /  AlkPhos  228[H]  03-13    PT/INR - ( 13 Mar 2025 00:16 )   PT: 12.4 sec;   INR: 1.09 ratio         PTT - ( 13 Mar 2025 00:16 )  PTT:22.9 sec  Urinalysis Basic - ( 13 Mar 2025 00:16 )    Color: x / Appearance: x / SG: x / pH: x  Gluc: 107 mg/dL / Ketone: x  / Bili: x / Urobili: x   Blood: x / Protein: x / Nitrite: x   Leuk Esterase: x / RBC: x / WBC x   Sq Epi: x / Non Sq Epi: x / Bacteria: x      Arterial Blood Gas:  03-13 @ 00:21  7.35/42/112/23/99.4/-2.3  ABG lactate: --      CAPILLARY BLOOD GLUCOSE    MICROBIOLOGY:     RADIOLOGY:  [ ] Reviewed and interpreted by me    Mode: AC/ CMV (Assist Control/ Continuous Mandatory Ventilation)  RR (machine): 16  TV (machine): 450  FiO2: 30  PEEP: 6  ITime: 1  MAP: 11  PIP: 27   Patient is a 69y old  Male who presents with a chief complaint of Acute on chronic hypoxemic respiratory failure (14 Mar 2025 04:49)      Interval Events: Awaiting Peg placement today                           Stable on full vent support overnight     REVIEW OF SYSTEMS:  [ ] Positive  [x ] All other systems negative  [ ] Unable to assess ROS because ______    Vital Signs Last 24 Hrs  T(C): 37.2 (03-14-25 @ 05:39), Max: 37.5 (03-13-25 @ 18:15)  T(F): 99 (03-14-25 @ 05:39), Max: 99.5 (03-13-25 @ 18:15)  HR: 82 (03-14-25 @ 05:48) (65 - 94)  BP: 155/88 (03-14-25 @ 05:39) (96/50 - 163/91)  RR: 20 (03-14-25 @ 00:00) (19 - 22)  SpO2: 100% (03-14-25 @ 05:48) (95% - 100%)    PHYSICAL EXAM:    HEENT:   [ ]Tracheostomy:  [ ]Pupils equal  [ ]No oral lesions  [ ]Abnormal    SKIN  [ ]No Rash  [ ] Abnormal  [ ] pressure    CARDIAC  [ ]Regular  [ ]Abnormal    PULMONARY  [ ]Bilateral Clear Breath Sounds  [ ]Normal Excursion  [ ]Abnormal    GI  [ ]PEG      [ ] +BS		              [ ]Soft, nondistended, nontender	  [ ]Abnormal    MUSCULOSKELETAL                                   [ ]Bedbound                 [ ]Abnormal    [ ]Ambulatory/OOB to chair                           EXTREMITIES                                         [ ]Normal  [ ]Edema                           NEUROLOGIC  [ ] Normal, non focal  [ ] Focal findings:    PSYCHIATRIC  [ ]Alert and appropriate  [ ] Sedated	 [ ]Agitated    :  [place]: [ ] Yes, if yes: Date of Placement:                   [  ] No    LINES: Central Lines [ ] Yes, if yes: Date of Placement                                     [  ] No    HOSPITAL MEDICATIONS:  MEDICATIONS  (STANDING):  albuterol/ipratropium for Nebulization 3 milliLiter(s) Nebulizer every 6 hours  atorvastatin 20 milliGRAM(s) Oral at bedtime  chlorhexidine 0.12% Liquid 15 milliLiter(s) Oral Mucosa every 12 hours  cholecalciferol 1000 Unit(s) Oral daily  epoetin krystyna-epbx (RETACRIT) Injectable 73834 Unit(s) SubCutaneous <User Schedule>  escitalopram 15 milliGRAM(s) Oral with breakfast  gabapentin Solution 150 milliGRAM(s) Oral every 12 hours  influenza  Vaccine (HIGH DOSE) 0.5 milliLiter(s) IntraMuscular once  lacosamide IVPB 200 milliGRAM(s) IV Intermittent every 12 hours  lamoTRIgine 50 milliGRAM(s) Oral two times a day  lanolin Ointment 1 Application(s) Topical daily  levETIRAcetam  IVPB 750 milliGRAM(s) IV Intermittent every 12 hours  levoFLOXacin IVPB      levoFLOXacin IVPB 750 milliGRAM(s) IV Intermittent every 48 hours  lurasidone 20 milliGRAM(s) Oral at bedtime  nystatin Powder 1 Application(s) Topical every 8 hours  pantoprazole  Injectable 40 milliGRAM(s) IV Push daily  polyethylene glycol 3350 17 Gram(s) Oral daily  senna Syrup 10 milliLiter(s) Oral at bedtime  sevelamer carbonate Powder 1600 milliGRAM(s) Oral every 8 hours    MEDICATIONS  (PRN):  acetaminophen     Tablet .. 650 milliGRAM(s) Oral every 6 hours PRN Temp greater or equal to 38C (100.4F), Mild Pain (1 - 3)  aluminum hydroxide/magnesium hydroxide/simethicone Suspension 30 milliLiter(s) Oral every 4 hours PRN Dyspepsia  artificial tears (preservative free) Ophthalmic Solution 1 Drop(s) Both EYES every 6 hours PRN Dry Eyes  melatonin 3 milliGRAM(s) Oral at bedtime PRN Insomnia  ondansetron Injectable 4 milliGRAM(s) IV Push every 8 hours PRN Nausea and/or Vomiting      LABS:                        7.9    7.78  )-----------( 130      ( 13 Mar 2025 00:16 )             24.8     03-13    141  |  105  |  58[H]  ----------------------------<  107[H]  4.3   |  21[L]  |  3.06[H]    Ca    9.6      13 Mar 2025 00:16  Phos  4.4     03-13  Mg     2.3     03-13    TPro  6.3  /  Alb  2.7[L]  /  TBili  0.4  /  DBili  x   /  AST  46[H]  /  ALT  38  /  AlkPhos  228[H]  03-13    PT/INR - ( 13 Mar 2025 00:16 )   PT: 12.4 sec;   INR: 1.09 ratio         PTT - ( 13 Mar 2025 00:16 )  PTT:22.9 sec  Urinalysis Basic - ( 13 Mar 2025 00:16 )    Color: x / Appearance: x / SG: x / pH: x  Gluc: 107 mg/dL / Ketone: x  / Bili: x / Urobili: x   Blood: x / Protein: x / Nitrite: x   Leuk Esterase: x / RBC: x / WBC x   Sq Epi: x / Non Sq Epi: x / Bacteria: x      Arterial Blood Gas:  03-13 @ 00:21  7.35/42/112/23/99.4/-2.3  ABG lactate: --      CAPILLARY BLOOD GLUCOSE    MICROBIOLOGY:     RADIOLOGY:  [ ] Reviewed and interpreted by me    Mode: AC/ CMV (Assist Control/ Continuous Mandatory Ventilation)  RR (machine): 16  TV (machine): 450  FiO2: 30  PEEP: 6  ITime: 1  MAP: 11  PIP: 27   Patient is a 69y old  Male who presents with a chief complaint of Acute on chronic hypoxemic respiratory failure (14 Mar 2025 04:49)      Interval Events: Awaiting Peg placement today                           Stable on full vent support overnight     REVIEW OF SYSTEMS:  [ ] Positive  [x ] All other systems negative  [ ] Unable to assess ROS because ______    Vital Signs Last 24 Hrs  T(C): 37.2 (03-14-25 @ 05:39), Max: 37.5 (03-13-25 @ 18:15)  T(F): 99 (03-14-25 @ 05:39), Max: 99.5 (03-13-25 @ 18:15)  HR: 82 (03-14-25 @ 05:48) (65 - 94)  BP: 155/88 (03-14-25 @ 05:39) (96/50 - 163/91)  RR: 20 (03-14-25 @ 00:00) (19 - 22)  SpO2: 100% (03-14-25 @ 05:48) (95% - 100%)    PHYSICAL EXAM:    HEENT:   [x ]Tracheostomy: # 7 cuffed portex   [x ]Pupils equal  [ ]No oral lesions  [ ]Abnormal    SKIN  [ ]No Rash  [x ] Abnormal: macerated skin noted on perineum, buttocks, and inner thighs   [ ] pressure    CARDIAC  [x ]Regular  [ ]Abnormal    PULMONARY  [x ]Bilateral Clear Breath Sounds  [ ]Normal Excursion  [ ]Abnormal    GI  [x ] NGT in place      [ ] +BS		              [x ]Soft, nondistended, nontender	  [ ]Abnormal    MUSCULOSKELETAL                                   [x ]Bedbound                 [ ]Abnormal    [ ]Ambulatory/OOB to chair                           EXTREMITIES                                         [ ]Normal  [ ]Edema                           NEUROLOGIC  [ ] Normal, non focal  [ ] Focal findings:    PSYCHIATRIC  [x ]Alert and appropriate  [ ] Sedated	 [ ]Agitated    :  [place]: [ ] Yes, if yes: Date of Placement:                   [x  ] No    LINES: Central Lines [ ] Yes, if yes: Date of Placement                                     [ x ] No    HOSPITAL MEDICATIONS:  MEDICATIONS  (STANDING):  albuterol/ipratropium for Nebulization 3 milliLiter(s) Nebulizer every 6 hours  atorvastatin 20 milliGRAM(s) Oral at bedtime  chlorhexidine 0.12% Liquid 15 milliLiter(s) Oral Mucosa every 12 hours  cholecalciferol 1000 Unit(s) Oral daily  epoetin krystyna-epbx (RETACRIT) Injectable 34347 Unit(s) SubCutaneous <User Schedule>  escitalopram 15 milliGRAM(s) Oral with breakfast  gabapentin Solution 150 milliGRAM(s) Oral every 12 hours  influenza  Vaccine (HIGH DOSE) 0.5 milliLiter(s) IntraMuscular once  lacosamide IVPB 200 milliGRAM(s) IV Intermittent every 12 hours  lamoTRIgine 50 milliGRAM(s) Oral two times a day  lanolin Ointment 1 Application(s) Topical daily  levETIRAcetam  IVPB 750 milliGRAM(s) IV Intermittent every 12 hours  levoFLOXacin IVPB      levoFLOXacin IVPB 750 milliGRAM(s) IV Intermittent every 48 hours  lurasidone 20 milliGRAM(s) Oral at bedtime  nystatin Powder 1 Application(s) Topical every 8 hours  pantoprazole  Injectable 40 milliGRAM(s) IV Push daily  polyethylene glycol 3350 17 Gram(s) Oral daily  senna Syrup 10 milliLiter(s) Oral at bedtime  sevelamer carbonate Powder 1600 milliGRAM(s) Oral every 8 hours    MEDICATIONS  (PRN):  acetaminophen     Tablet .. 650 milliGRAM(s) Oral every 6 hours PRN Temp greater or equal to 38C (100.4F), Mild Pain (1 - 3)  aluminum hydroxide/magnesium hydroxide/simethicone Suspension 30 milliLiter(s) Oral every 4 hours PRN Dyspepsia  artificial tears (preservative free) Ophthalmic Solution 1 Drop(s) Both EYES every 6 hours PRN Dry Eyes  melatonin 3 milliGRAM(s) Oral at bedtime PRN Insomnia  ondansetron Injectable 4 milliGRAM(s) IV Push every 8 hours PRN Nausea and/or Vomiting      LABS:                        7.9    7.78  )-----------( 130      ( 13 Mar 2025 00:16 )             24.8     03-13    141  |  105  |  58[H]  ----------------------------<  107[H]  4.3   |  21[L]  |  3.06[H]    Ca    9.6      13 Mar 2025 00:16  Phos  4.4     03-13  Mg     2.3     03-13    TPro  6.3  /  Alb  2.7[L]  /  TBili  0.4  /  DBili  x   /  AST  46[H]  /  ALT  38  /  AlkPhos  228[H]  03-13    PT/INR - ( 13 Mar 2025 00:16 )   PT: 12.4 sec;   INR: 1.09 ratio         PTT - ( 13 Mar 2025 00:16 )  PTT:22.9 sec  Urinalysis Basic - ( 13 Mar 2025 00:16 )    Color: x / Appearance: x / SG: x / pH: x  Gluc: 107 mg/dL / Ketone: x  / Bili: x / Urobili: x   Blood: x / Protein: x / Nitrite: x   Leuk Esterase: x / RBC: x / WBC x   Sq Epi: x / Non Sq Epi: x / Bacteria: x      Arterial Blood Gas:  03-13 @ 00:21  7.35/42/112/23/99.4/-2.3  ABG lactate: --      CAPILLARY BLOOD GLUCOSE    MICROBIOLOGY:     RADIOLOGY:  [ ] Reviewed and interpreted by me    Mode: AC/ CMV (Assist Control/ Continuous Mandatory Ventilation)  RR (machine): 16  TV (machine): 450  FiO2: 30  PEEP: 6  ITime: 1  MAP: 11  PIP: 27

## 2025-03-14 NOTE — PROGRESS NOTE ADULT - PROBLEM SELECTOR PLAN 1
Primarily caused by Covid, superimposed by bacterial pneumonia  - worsening secretions and consolidations on 3/8  - Now s/p trach 3/12    - Sputum cx. with Pseudomonas in sputum, resistant to Zosyn.  - Levaquin renally dosed 750 q48h (3/9-3/16) to complete 7d course. Primarily caused by Covid, superimposed by bacterial pneumonia  - worsening secretions and consolidations on 3/8  - Now s/p trach 3/12    - BAL culture 3/8,  with Pseudomonas in sputum, resistant to Zosyn.  - Levaquin renally dosed 750 q48h (3/9-3/16) to complete 7d course.

## 2025-03-14 NOTE — PROGRESS NOTE ADULT - ASSESSMENT
Mr. Gr is a 69 year old male with PMHx of seizure disorder, sleep apnea, HTN, chronic idiopathic constipation, stage III CKD, severe obesity, secondary hyperparathyroidism, anemia, thrombocytopenia, hyperlipidemia, testicular cancer under treatment with Dr. Ovalles who is admitted for acute hypoxic respiratory failure secondary to COVID vs superimposed pneumonia with new onset thrombocytopenia. He was recently discharged 02/06/2025 after a tenous stay including intubation in the ICU for respiratory failure in setting of seizure. He had recovered and was discharged to rehab.      Former smoker, quit 14y prior, denied ETOH, drug use. Lives at home. Does not have children. Denies family history of blood disorders or malignancy.  Denies previous workplace related exposures.  Denies previous history of blood transfusions.  Denied history of thromboses.  Denied history of  requiring anticoagulation.     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  Margins were negative.  pT3Nx. CT C/A/P with few left para-aortic and left iliac chain lymph nodes largest measuring 8.1 cm left para-aortic node, bilateral subcentimeter noncalcified pulmonary nodules measuring up to 7 mm. AFP, LDH, hCG were all elevated. 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 Started on adjuvant therapy with Cisplatin+Etoposide, so far completed 4 cycles of treatment with cisplatin dose reduced 50% and Etoposide 50% due to thrombocytopenia.      02/19/2025: on HFNC, noted tachycardia and fever, Klebsiella in urine as well, thrombocytopenia stable/improving, no signs of active bleeding     02/20/2025: developed A.fib with RVR and was placed on heparin drip and pending cardiology eval    02/21/2025: awake, on AVAPS overnight, noted RRT for hypoxia as pt removed HFNC at some point in time, good response thereafter     02/22/2025:  continues on AVAPS this morning, noted RRT overnight for hypoxia, hypercapnia, ICU following, US LE negative for DVT, counts overall stable/improved     02/23/2025: progressive respiratory failure, noted ongoing RTT this morning with pending intubation and transfer to MICU     02/24/2025: intubated 02/23/2025, sedated, on pressor support, no signs of bleeding, counts noted, no signs of bleeding     02/25/2025: continues in MICU, intubated and sedated, no signs of bleeding    02/26/2025: continues in MICU, intubated, without signs of bleeding, continues on heparin drip     02/27/2025:  remains under the care of the ICU, intubated, no signs of bleeding and continues on heparin drip, counts stable, has not required PRBC support this admission    02/28/2025: continues under the care of MICU, continues intubated, no signs of bleeding, on heparin drip    03/01/2025: continues in MICU, intubated, direct josefina IgG positive, eluate negative, not likely to be clinically significant     03/02/2025:  continues in MICU, nursing staff at bedside, no overnight events noted. CTH without acute intracranial pathology     03/03/2025: continues in MICU, intubated, on heparin drip, 1u PRBC overnight, no signs of bleeding, platelet count stable     03/04/2025: awake, moving arms spontaneously on exam, continues without much interaction however. No signs of bleeding, continues heparin drip, continues intubated in MICU     03/05/2025: extubated 03/04/2025, continues in MICU, awake and alert this morning and able to speak full sentences, discussed/reviewed findings, continues on heparin drip     03/06/2025: nursing staff at bedside, bipap overnight, without signs of bleeding, counts noted stable, renal function improving     03/07/2025:  no signs of bleeding, counts noted, continues heparin drip, continues in MICU    03/08/2025: on HFNC, no signs of bleeding, although alert and answering questions, not back to his baseline yet, continues in MICU    03/09/2025: continues in MICU, s/p re-intubation 03/08/2025 given respiratory compromise in the setting of copious secretions as evidenced by bronchoscopy, in setting of fever, now sedated, mild crusting of blood around mouth, without active sign of bleeding, counts noted reviewed, continues on heparin drip     03/10/2025: continues in MICU, no signs of bleeding, discussed with nursing staff at bedside, receiving 1u PRBC due to H/H downtrend, sputum culture with Pseudomonas, ICU and ID recs noted, continues to be intubated     03/11/2025: continues in MICU, intubated and sedated, noted counts, without signs of bleeding, appropriate response to transfusion    03/12/2025: continues in MICU, intubated and sedated, without signs of bleeding     03/13/2025: continues in MICU, s/p Tracheostomy 03/12/2025 with tracheal intubation, continues with sedation       #Acute hypoxic respiratory failure  # COVID  - CT noted with bilateral GGO  - ID following  - Pulmonary following  - Antibiotics per primary team/MICU and ID   - Intubated 02/23/2025 AM, MICU   - extubated 03/04/2025  - Pseudomonas from sputum culture   - Re-intubated 03/09/2025 due to increased work of breathing in setting of increased secretions, not protecting airway, tachypnea, hypoxia   - s/p Tracheostomy 03/12/2025      # Thrombocytopenia   - Did occur during most recent admission and improved to normal prior to discharge   - Without signs of bleeding  - Could be multifactorial, possibly due to infection   - Continue to trend  - Transfuse to maintain Plt > 10k unless actively bleeding then maintain > 50k     # Brain lesion  - pt with hx of seizures  - MRI brain now with 5.4 mm enhancing lesion in the LEFT middle cerebellar peduncle with associated edema suspicious for metastases  - MRI Lumbar negative for acute disease  - Small lesion without mass effect or edema, recommended to correlate per neurology if foci and locus are concordant with seizure activity  - Neurology recs noted, new lesion not likely to cause seizure  - It is rare, but nonetheless not impossible, for testicular cancer to be metastatic to the brain  - He will need another PET-CT, can be completed outpatient once stable if concern can proceed with biopsy at that time   - Previous endocrinology eval for thyroid nodule noted  - AFP/BHCH normal,  previously   - Repeat CTH without acute intracranial pathology       # 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 Ovalles, agree with current plan  - Follow up as outpatient with Franki Ovalles MD     # Acute kidney injury on CKD Stage IIIA  - Likely multifactorial  - Nephrology consult and recs noted  - Continue to trend     # Normocytic anemia  - Chronic, has hx of PRBC during chemo 07/2024  - Noted Anti E, Anti-K Anti-C antibodies previously  - direct josefina IgG positive, eluate negative, not likely to be clinically significant   - s/p 2u PRBC 03/03/2025 and 03/10/2025   - Monitor for bleeding  - Recommend to transfuse to maintain Hb > 7    # Klebsiella UTI  # Pseudomonas UTI   -Antibiotics per ID and primary team       Recommendations  - Trend CBC daily, obtain post transfusion CBC today   - Transfuse to maintain Hb > 7   - Transfuse to maintain Plt >10k unless active bleeding then >50k   - Monitor renal function  - Cardiology follow up to address anticoagulation, was on heparin drip   - Consider Hep SubQ for DVT PPx   - f/u ongoing ICU recs         Thank you for allowing me to participate in the care of Mr. Gr please do not hesitate to call or text me if you have further questions or concerns.     Brayan Mart MD  [facility] [place]   Division of Hematology/Oncology  [address], [address]  [place] [address]  P: [phone]  F: [phone]    Attestation:    ----Pt evaluated including face-to-face interaction in addition to chart review, reviewing treatment plan, and managing the patient’s chronic diagnoses as listed in the assessment----        Mr. Gr is a 69 year old male with PMHx of seizure disorder, sleep apnea, HTN, chronic idiopathic constipation, stage III CKD, severe obesity, secondary hyperparathyroidism, anemia, thrombocytopenia, hyperlipidemia, testicular cancer under treatment with Dr. Ovalles who is admitted for acute hypoxic respiratory failure secondary to COVID vs superimposed pneumonia with new onset thrombocytopenia. He was recently discharged 02/06/2025 after a tenous stay including intubation in the ICU for respiratory failure in setting of seizure. He had recovered and was discharged to rehab.      Former smoker, quit 14y prior, denied ETOH, drug use. Lives at home. Does not have children. Denies family history of blood disorders or malignancy.  Denies previous workplace related exposures.  Denies previous history of blood transfusions.  Denied history of thromboses.  Denied history of  requiring anticoagulation.     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  Margins were negative.  pT3Nx. CT C/A/P with few left para-aortic and left iliac chain lymph nodes largest measuring 8.1 cm left para-aortic node, bilateral subcentimeter noncalcified pulmonary nodules measuring up to 7 mm. AFP, LDH, hCG were all elevated. 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 Started on adjuvant therapy with Cisplatin+Etoposide, so far completed 4 cycles of treatment with cisplatin dose reduced 50% and Etoposide 50% due to thrombocytopenia.      02/19/2025: on HFNC, noted tachycardia and fever, Klebsiella in urine as well, thrombocytopenia stable/improving, no signs of active bleeding     02/20/2025: developed A.fib with RVR and was placed on heparin drip and pending cardiology eval    02/21/2025: awake, on AVAPS overnight, noted RRT for hypoxia as pt removed HFNC at some point in time, good response thereafter     02/22/2025:  continues on AVAPS this morning, noted RRT overnight for hypoxia, hypercapnia, ICU following, US LE negative for DVT, counts overall stable/improved     02/23/2025: progressive respiratory failure, noted ongoing RTT this morning with pending intubation and transfer to MICU     02/24/2025: intubated 02/23/2025, sedated, on pressor support, no signs of bleeding, counts noted, no signs of bleeding     02/25/2025: continues in MICU, intubated and sedated, no signs of bleeding    02/26/2025: continues in MICU, intubated, without signs of bleeding, continues on heparin drip     02/27/2025:  remains under the care of the ICU, intubated, no signs of bleeding and continues on heparin drip, counts stable, has not required PRBC support this admission    02/28/2025: continues under the care of MICU, continues intubated, no signs of bleeding, on heparin drip    03/01/2025: continues in MICU, intubated, direct josefina IgG positive, eluate negative, not likely to be clinically significant     03/02/2025:  continues in MICU, nursing staff at bedside, no overnight events noted. CTH without acute intracranial pathology     03/03/2025: continues in MICU, intubated, on heparin drip, 1u PRBC overnight, no signs of bleeding, platelet count stable     03/04/2025: awake, moving arms spontaneously on exam, continues without much interaction however. No signs of bleeding, continues heparin drip, continues intubated in MICU     03/05/2025: extubated 03/04/2025, continues in MICU, awake and alert this morning and able to speak full sentences, discussed/reviewed findings, continues on heparin drip     03/06/2025: nursing staff at bedside, bipap overnight, without signs of bleeding, counts noted stable, renal function improving     03/07/2025:  no signs of bleeding, counts noted, continues heparin drip, continues in MICU    03/08/2025: on HFNC, no signs of bleeding, although alert and answering questions, not back to his baseline yet, continues in MICU    03/09/2025: continues in MICU, s/p re-intubation 03/08/2025 given respiratory compromise in the setting of copious secretions as evidenced by bronchoscopy, in setting of fever, now sedated, mild crusting of blood around mouth, without active sign of bleeding, counts noted reviewed, continues on heparin drip     03/10/2025: continues in MICU, no signs of bleeding, discussed with nursing staff at bedside, receiving 1u PRBC due to H/H downtrend, sputum culture with Pseudomonas, ICU and ID recs noted, continues to be intubated     03/11/2025: continues in MICU, intubated and sedated, noted counts, without signs of bleeding, appropriate response to transfusion    03/12/2025: continues in MICU, intubated and sedated, without signs of bleeding     03/13/2025: continues in MICU, s/p Tracheostomy 03/12/2025 with tracheal intubation, continues with sedation     03/14/2025:  awake and alert, mental status much improved, transferred from MICU to [address] [place] [address] 03/13/2025. Without significant events overnight, discussed with nursing staff at bedside, stated pt did well overnight, no signs of bleeding, no fever noted, medications provided via NGT and he is pending PEG-Tube placement today. His case is complex, noted with repeat need for intubations, discussed with pt, he is aware. Discussed with pts primary Oncologist as well.    #Acute hypoxic respiratory failure, Acute, Severe   # COVID  - CT noted with bilateral GGO  - ID following  - Pulmonary following  - Antibiotics per primary team/MICU and ID   - Intubated 02/23/2025 AM, MICU   - extubated 03/04/2025  - Pseudomonas from sputum culture   - Re-intubated 03/09/2025 due to increased work of breathing in setting of increased secretions, not protecting airway, tachypnea, hypoxia   - s/p Tracheostomy 03/12/2025      # Thrombocytopenia, Acute, Moderate   - Did occur during most recent admission and improved to normal prior to discharge   - Without signs of bleeding  - Could be multifactorial, possibly due to infection   - Continue to trend  - Transfuse to maintain Plt > 10k unless actively bleeding then maintain > 50k     # Brain lesion, Acute, Severe   - pt with hx of seizures  - MRI brain now with 5.4 mm enhancing lesion in the LEFT middle cerebellar peduncle with associated edema suspicious for metastases  - MRI Lumbar negative for acute disease  - Small lesion without mass effect or edema, recommended to correlate per neurology if foci and locus are concordant with seizure activity  - Neurology recs noted, new lesion not likely to cause seizure  - It is rare, but nonetheless not impossible, for testicular cancer to be metastatic to the brain  - He will need another PET-CT, can be completed outpatient once stable if concern can proceed with biopsy at that time   - Previous endocrinology eval for thyroid nodule noted  - AFP/BHCH normal,  previously   - Repeat CTH without acute intracranial pathology       # 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 Ovalles, agree with current plan  - Follow up as outpatient with Franki Ovalles MD     # Acute kidney injury on CKD Stage IIIA, Acute, Moderate   - Likely multifactorial  - Nephrology consult and recs noted  - Continue to trend     # Normocytic anemia, Acute, Severe   - Chronic, has hx of PRBC during chemo 07/2024  - Noted Anti E, Anti-K Anti-C antibodies previously  - direct josefina IgG positive, eluate negative, not likely to be clinically significant   - s/p 2u PRBC 03/03/2025 and 03/10/2025   - Monitor for bleeding  - Recommend to transfuse to maintain Hb > 7    # Klebsiella UTI, Acute, Severe > Moderate   # Pseudomonas UTI   -Antibiotics per ID and primary team       Recommendations  - Trend CBC daily, obtain post transfusion CBC today   - Transfuse to maintain Hb > 7   - Transfuse to maintain Plt >10k unless active bleeding then >50k   - Monitor renal function  - Cardiology follow up to address therapeutic anticoagulation, was on heparin drip   - Consider at least Hep SubQ for DVT PPx         Thank you for allowing me to participate in the care of Mr. Gr please do not hesitate to call or text me if you have further questions or concerns.     Brayan Mart MD  [facility]   Division of Hematology/Oncology  [address], [address]  [place] [address]  P: [phone]  F: [phone]    Attestation:    ----Pt evaluated, >50min spent including face-to-face interaction in addition to chart review, reviewing treatment plan, discussing with care staff in hospital, his outside physician, and managing the patient’s chronic diagnoses as listed in the assessment----

## 2025-03-14 NOTE — PROGRESS NOTE ADULT - ASSESSMENT
Patient is a 69 year old male with PMH of HTN, HLD, VAZQUEZ (on CPAP at bedtime, NC 2 liters during the day), CKD3, epilepsy, schizophrenia, developmental delay, metastatic testicular cancer (s/p orchiectomy, actively on chemotherapy, last dose of etoposide/cisplatin on 6/2024) presenting with worsening shortness of breath. Patient was recently hospitalized at [facility] from January 27th 2025 to February 6th 2025 for seizure and influenza virus infection, which required intubation. He was sent to rehab (originally from home) from hospital and now returns due to sudden onset shortness of breath and worsening oxygenation. Of note, he tested positive for COVID-19 at facility on 2/13/25 and was not put on any COVID-19 treatment at the time, only guaifenesin and scopolamine patch. Patient was placed on non-rebreather and sent to the hospital on 2/16/25. Noted initial discussion with patient/family and they decided to hold off on remdesivir given LFTs and SHAGUFTA.     Acute on chronic hypoxic respiratory failure  COVID-19 pneumonia, superimposed bacterial pneumonia  Acute on chronic HFpEF  Klebsiella UTI, treated   SHAGUFTA on CKD  Severe sepsis, hypotension, SHAGUFTA, likely due to aspiration pneumonia, treated  Now with fever with copious secretions post extubation - Pseudomonas pneumonia   Pseudomonas UTI  - CT chest with extensive b/l ggo c/w COVID pneumonia; tracheomalacia   - MRSA PCR screen negative, s/p vancomycin   - 2/18 worsening resp status, requiring AVAPS, started on remdesivir   - 2/22 completed remdesivir x5d course   - 2/23 s/p RRT for inc WOB, s/p intubation, suspected aspiration   - 2/24 intubated, on sedation, pressor support, SHAGUFTA, WBC wnl   - 2/25 afebrile, off pressor, WBC wnl, Cr stable  - 2/26 CT chest with improved upper lobe ggo, new/worsening confluent areas of consolidation in mid-lower lungs   - 2/26 completed dexamethasone x10d  - 2/28 completed zosyn   - 3/4 s/p extubation, now on NC, copious secretions requiring frequent suctioning   - 3/5 started on erythromycin for conjunctivitis, culture grew Staph epi - S to erythromycin   - 3/7 febrile, WBC noted, secretions less but still a lot, worsening pulm findings, likely aspirating, started on zosyn    - s/p re-intubation and bronchoscopy with L mainstem mucous plug, thick secretions -send for culture    - 3/7 Scx noted with Pseudomonas aeruginosa -- now R to zosyn (h/o pansensitive Pseudomonas in Scx in 1/2025)   - 3/8 BAL culture with mod Pseudomonas   -  3/9 pm switched from zosyn to levofloxacin based on sensitivities   - 3/9 noted with cloudy urine, UA with pyuria -- Ucx growing Pseudomonas also (sensitivities noted)   - 3/10 WBC normalized, Hb low, temps wnl, remains intubated   - 3/12 s/p tracheostomy   - 3/14 WBC noted ?reactive, remains afebrile, no new focal findings on exam, nontoxic appearing     Recommendations:  Continue on levofloxacin (renally dosed) to complete 7 days on 3/15  GI following, plan for PEG today   -no abs contraindication from ID standpoint at this time   Nephrology following, monitor Cr  Monitor temps/WBC  Supportive care  Aspiration precautions  Continue rest of care per primary team     Over the weekend Dr. Sajan Burger will be covering for our group. If you have any questions, concerns or new micro data please reach out to them using TEAMS *PREFERRED* or by calling our service at [phone].     Greyson Mart M.D.  [facility]  Available on [employer] TEAMS - *PREFERRED*  [phone]  After 5pm on weekdays and all day on weekends - please call [phone]     Thank you for consulting us and involving us in the management of this patients case. In addition to reviewing history, imaging, documents, labs, microbiology, took into account antibiotic stewardship, local antibiogram and infection control strategies and potential transmission issues.

## 2025-03-14 NOTE — PROGRESS NOTE ADULT - SUBJECTIVE AND OBJECTIVE BOX
DATE OF SERVICE: 03-14-25 @ 10:17    Patient is a 69y old  Male who presents with a chief complaint of Acute on chronic hypoxemic respiratory failure (14 Mar 2025 07:41)      INTERVAL HISTORY: in no acute distress, planned for PEG tube placement today    REVIEW OF SYSTEMS:  CONSTITUTIONAL: No weakness  EYES/ENT: No visual changes;  No throat pain   NECK: No pain or stiffness  RESPIRATORY: No cough, wheezing; No shortness of breath  CARDIOVASCULAR: No chest pain or palpitations  GASTROINTESTINAL: No abdominal  pain. No nausea, vomiting, or hematemesis  GENITOURINARY: No dysuria, frequency or hematuria  NEUROLOGICAL: No stroke like symptoms  SKIN: No rashes    TELEMETRY Personally reviewed:   	  MEDICATIONS:        PHYSICAL EXAM:  T(C): 37.2 (03-14-25 @ 05:39), Max: 37.5 (03-13-25 @ 18:15)  HR: 82 (03-14-25 @ 05:48) (66 - 94)  BP: 155/88 (03-14-25 @ 05:39) (116/57 - 163/91)  RR: 20 (03-14-25 @ 00:00) (20 - 22)  SpO2: 100% (03-14-25 @ 05:48) (95% - 100%)  Wt(kg): --  I&O's Summary    13 Mar 2025 07:01  -  14 Mar 2025 07:00  --------------------------------------------------------  IN: 216.8 mL / OUT: 820 mL / NET: -603.2 mL          Appearance: In no distress	  HEENT:    PERRL, EOMI	  Cardiovascular:  S1 S2, No JVD  Respiratory: Lungs clear to auscultation	  Gastrointestinal:  Soft, Non-tender, + BS	  Vascularature:  No edema of LE  Psychiatric: Appropriate affect   Neuro: no acute focal deficits                               8.1    11.78 )-----------( 148      ( 14 Mar 2025 07:13 )             26.0     03-14    141  |  106  |  50[H]  ----------------------------<  93  3.8   |  20[L]  |  2.67[H]    Ca    9.8      14 Mar 2025 07:14  Phos  3.2     03-14  Mg     2.0     03-14    TPro  6.8  /  Alb  2.9[L]  /  TBili  0.5  /  DBili  x   /  AST  50[H]  /  ALT  37  /  AlkPhos  251[H]  03-14        Labs personally reviewed      ASSESSMENT/PLAN: 	    69 year old male with a past medical history of hypertension, hyperlipemia VAZQUEZ (on CPAP at bedtime, NC 2 liters during the day), CKD stage 3, epilepsy, schizophrenia, developmental delay, metastatic testicular cancer (s/p orchiectomy, actively on chemotherapy, last dose of etoposide/cisplatin on 6/2024), presenting with acute on chronic hypoxemic respiratory failure, secondary to (a) COVID-19 infection, (b) superimposed pneumonia after recent influenza infection, and/or (c) fluid overload.     Problem/Plan - 1:  ·  Problem: Acute on chronic respiratory failure with hypoxia.   ·  Plan: Likely 2/2 PNA, HF  - Appreciate pulmonology.  - Transferred to MICU for hypoxia. Appreciate MICU care.   - 2/24 started on Bumex gtt at 2mg/hr  - 2/26 No appreciable JVD or peripheral edema. BP now soft with labile but stable Cr. Does not appear to be a HF. Now off diuretics.   --- 2/26 CT Chest shows new and worsening confluent areas of consolidation/pneumonia in the mid to lower lungs.  - CXR 3/10 with unchanged patchy opacities throughout the left lung  - s/p Tracheostomy 03/12/2025     Problem/Plan - 2:  ·  Problem: SHAGUFTA (acute kidney injury).   ·  Plan: Has a history of CKD stage 3, Cr 2.38 at baseline. Presents with Cr 3.27.   - Nephrology following  - BUN now improved following de-escalation of diuretics    Problem/Plan - 3:  ·  Problem: Chronic heart failure with preserved ejection fraction.   ·  Plan: TTE done here 1/17/25 technically difficult, but LV systolic fxn appears nl without any regional wall motion abnormalities  - Repeat TTE pending  - BNP 5000 <---1100 but appears euvolemic   - s/p bumex gtt per ICU     Problem/Plan - 4:  ·  Problem: New onset Afib RVR (on tele, confirmed with EKG 2/19)   ·  Plan:  - Unable to tolerate rate control 2/2 hypotension  - AC on hold given thrombocytopenia and anemia    Problem/Plan - 5:  ·  Problem: HTN    ·  Plan: Still on pressors as per MICU    Problem/Plan - 6:  ·  Problem: Cardiac Risk Stratification   ·  Plan: Patient is mod risk for mod risk PEG placement. No contraindication to proceed.   - Tele with stable HR SR with PACs  - Patient with preserved EF, not currently in decompensated HF  - No significant valvular dysfunction        TUAN Vanessa,  [facility]  Cardiovascular Medicine  [address] [place] [address], [address]  Available through call or text on [employer] TE[employer]  Office: [phone]     DATE OF SERVICE: 03-14-25 @ 10:17    Patient is a 69y old  Male who presents with a chief complaint of Acute on chronic hypoxemic respiratory failure (14 Mar 2025 07:41)      INTERVAL HISTORY: in no acute distress, s/p PEG tube placement today    REVIEW OF SYSTEMS:   CONSTITUTIONAL: No weakness  EYES/ENT: No visual changes;  No throat pain   NECK: No pain or stiffness  RESPIRATORY: No cough, wheezing; No shortness of breath  CARDIOVASCULAR: No chest pain or palpitations  GASTROINTESTINAL: No abdominal  pain. No nausea, vomiting, or hematemesis  GENITOURINARY: No dysuria, frequency or hematuria  NEUROLOGICAL: No stroke like symptoms  SKIN: No rashes      MEDICATIONS:        PHYSICAL EXAM:  T(C): 37.2 (03-14-25 @ 05:39), Max: 37.5 (03-13-25 @ 18:15)  HR: 82 (03-14-25 @ 05:48) (66 - 94)  BP: 155/88 (03-14-25 @ 05:39) (116/57 - 163/91)  RR: 20 (03-14-25 @ 00:00) (20 - 22)  SpO2: 100% (03-14-25 @ 05:48) (95% - 100%)  Wt(kg): --  I&O's Summary    13 Mar 2025 07:01  -  14 Mar 2025 07:00  --------------------------------------------------------  IN: 216.8 mL / OUT: 820 mL / NET: -603.2 mL          Appearance: In no distress	  HEENT:    PERRL, EOMI	  Cardiovascular:  S1 S2, No JVD  Respiratory: Lungs clear to auscultation; + tracheostomy  Gastrointestinal:  Soft, Non-tender, + BS	  Vascularature:  No edema of LE  Psychiatric: Appropriate affect   Neuro: no acute focal deficits                               8.1    11.78 )-----------( 148      ( 14 Mar 2025 07:13 )             26.0     03-14    141  |  106  |  50[H]  ----------------------------<  93  3.8   |  20[L]  |  2.67[H]    Ca    9.8      14 Mar 2025 07:14  Phos  3.2     03-14  Mg     2.0     03-14    TPro  6.8  /  Alb  2.9[L]  /  TBili  0.5  /  DBili  x   /  AST  50[H]  /  ALT  37  /  AlkPhos  251[H]  03-14        Labs personally reviewed      ASSESSMENT/PLAN: 	    69 year old male with a past medical history of hypertension, hyperlipemia VAZQUEZ (on CPAP at bedtime, NC 2 liters during the day), CKD stage 3, epilepsy, schizophrenia, developmental delay, metastatic testicular cancer (s/p orchiectomy, actively on chemotherapy, last dose of etoposide/cisplatin on 6/2024), presenting with acute on chronic hypoxemic respiratory failure, secondary to (a) COVID-19 infection, (b) superimposed pneumonia after recent influenza infection, and/or (c) fluid overload.     Problem/Plan - 1:  ·  Problem: Acute on chronic respiratory failure with hypoxia.   ·  Plan: Likely 2/2 PNA, HF  - Appreciate pulmonology.  - Transferred to MICU for hypoxia. Appreciate MICU care.   - 2/24 started on Bumex gtt at 2mg/hr  - 2/26 No appreciable JVD or peripheral edema. BP now soft with labile but stable Cr. Does not appear to be a HF. Now off diuretics.   --- 2/26 CT Chest shows new and worsening confluent areas of consolidation/pneumonia in the mid to lower lungs.  - CXR 3/10 with unchanged patchy opacities throughout the left lung  - s/p Tracheostomy 03/12/2025     Problem/Plan - 2:  ·  Problem: SHAGUFTA (acute kidney injury).   ·  Plan: Has a history of CKD stage 3, Cr 2.38 at baseline. Presents with Cr 3.27.   - Nephrology following  - BUN now improved following de-escalation of diuretics    Problem/Plan - 3:  ·  Problem: Chronic heart failure with preserved ejection fraction.   ·  Plan: TTE done here 1/17/25 technically difficult, but LV systolic fxn appears nl without any regional wall motion abnormalities  - Repeat TTE pending  - BNP 5000 <---1100 but appears euvolemic   - s/p bumex gtt per ICU     Problem/Plan - 4:  ·  Problem: New onset Afib RVR (on tele, confirmed with EKG 2/19)   ·  Plan:  - Unable to tolerate rate control 2/2 hypotension  - AC on hold given thrombocytopenia and anemia    Problem/Plan - 5:  ·  Problem: HTN    ·  Plan: pressors as per MICU; no longer on pressors    Problem/Plan - 6:  ·  Problem: Cardiac Risk Stratification   ·  Plan: Patient is mod risk for mod risk PEG placement. No contraindication to proceed.   - Tele with stable HR SR with PACs  - Patient with preserved EF, not currently in decompensated HF  - No significant valvular dysfunction        TUAN Vanessa, DO [facility]  Cardiovascular Medicine  [address] [place] [address], [address]  Available through call or text on [employer] TEAM[employer]  Office: [phone]     DATE OF SERVICE: 03-14-25 @ 10:17    Patient is a 69y old  Male who presents with a chief complaint of Acute on chronic hypoxemic respiratory failure (14 Mar 2025 07:41)      INTERVAL HISTORY: in no acute distress, s/p PEG tube placement today    REVIEW OF SYSTEMS:   CONSTITUTIONAL: No weakness  EYES/ENT: No visual changes;  No throat pain   NECK: No pain or stiffness  RESPIRATORY: No cough, wheezing; No shortness of breath  CARDIOVASCULAR: No chest pain or palpitations  GASTROINTESTINAL: No abdominal  pain. No nausea, vomiting, or hematemesis  GENITOURINARY: No dysuria, frequency or hematuria  NEUROLOGICAL: No stroke like symptoms  SKIN: No rashes      MEDICATIONS:        PHYSICAL EXAM:  T(C): 37.2 (03-14-25 @ 05:39), Max: 37.5 (03-13-25 @ 18:15)  HR: 82 (03-14-25 @ 05:48) (66 - 94)  BP: 155/88 (03-14-25 @ 05:39) (116/57 - 163/91)  RR: 20 (03-14-25 @ 00:00) (20 - 22)  SpO2: 100% (03-14-25 @ 05:48) (95% - 100%)  Wt(kg): --  I&O's Summary    13 Mar 2025 07:01  -  14 Mar 2025 07:00  --------------------------------------------------------  IN: 216.8 mL / OUT: 820 mL / NET: -603.2 mL          Appearance: In no distress	  HEENT:    PERRL, EOMI	  Cardiovascular:  S1 S2, No JVD  Respiratory: Lungs clear to auscultation; + tracheostomy  Gastrointestinal:  Soft, Non-tender, + BS	  Vascularature:  No edema of LE  Psychiatric: Appropriate affect   Neuro: no acute focal deficits                               8.1    11.78 )-----------( 148      ( 14 Mar 2025 07:13 )             26.0     03-14    141  |  106  |  50[H]  ----------------------------<  93  3.8   |  20[L]  |  2.67[H]    Ca    9.8      14 Mar 2025 07:14  Phos  3.2     03-14  Mg     2.0     03-14    TPro  6.8  /  Alb  2.9[L]  /  TBili  0.5  /  DBili  x   /  AST  50[H]  /  ALT  37  /  AlkPhos  251[H]  03-14        Labs personally reviewed      ASSESSMENT/PLAN: 	  ASSESSMENT/PLAN: 	     69 year old male with a past medical history of hypertension, hyperlipemia VAZQUEZ (on CPAP at bedtime, NC 2 liters during the day), CKD stage 3, epilepsy, schizophrenia, developmental delay, metastatic testicular cancer (s/p orchiectomy, actively on chemotherapy, last dose of etoposide/cisplatin on 6/2024), presenting with acute on chronic hypoxemic respiratory failure, secondary to (a) COVID-19 infection, (b) superimposed pneumonia after recent influenza infection, and/or (c) fluid overload.     Problem/Plan - 1:  ·  Problem: Acute on chronic respiratory failure with hypoxia.   ·  Plan: Likely 2/2 PNA, HF  - Appreciate pulmonology.  - Transferred to RCU s/p tracheostomy     Problem/Plan - 2:  ·  Problem: SHAGUFTA (acute kidney injury).   ·  Plan: Has a history of CKD stage 3, Cr 2.38 at baseline.    - Nephrology following  - BUN now improved following de-escalation of diuretics    Problem/Plan - 3:  ·  Problem: Chronic heart failure with preserved ejection fraction.   ·  Plan: TTE done here 1/17/25 technically difficult, but LV systolic fxn appears nl without any regional wall motion abnormalities  - Euvolemic, repeat BNP. On 2/23 2300    Problem/Plan - 4:  ·  Problem: New onset Afib RVR (on tele, confirmed with EKG 2/19)   ·  Plan:  - Rec Metoprolol 25mg BID  - Rec Eliquis 5mg BID      Problem/Plan - 5:  ·  Problem: HTN    ·  Plan: Resolved          TUAN Vanessa, DO [facility]  Cardiovascular Medicine  [address], [address]  Available through call or text on [employer] TEAM[employer]  Office: [phone]

## 2025-03-14 NOTE — PROGRESS NOTE ADULT - SUBJECTIVE AND OBJECTIVE BOX
[facility]  JACINTO Horton Y. Patel, S. Shah, G. Casimir  [phone]  ([phone] - weekdays after 5pm and weekends)    Name: OSCAR MILAN  Age/Gender: 69y Male  MRN: [id number]    Interval History:  Patient seen and examined this morning.   Resting comfortably, discomfort at trach area  Denies fever, chest pain, abd pain, nausea.   Notes reviewed  No concerning overnight events  Afebrile   Allergies: seasonal allergies (Unknown)  No Known Drug Allergies      Objective:  Vitals:   T(F): 99 (03-14-25 @ 05:39), Max: 99.5 (03-13-25 @ 18:15)  HR: 82 (03-14-25 @ 05:48) (66 - 94)  BP: 155/88 (03-14-25 @ 05:39) (116/57 - 163/91)  RR: 20 (03-14-25 @ 00:00) (20 - 22)  SpO2: 100% (03-14-25 @ 05:48) (95% - 100%)  Physical Examination:  General: no acute distress, nontoxic appearing   HEENT: NC/AT, dec upper lip scab, NGT, trach  Respiratory: decreased breath sounds b/l   Cardiovascular: S1 and S2 present, normal rate   Gastrointestinal: soft, nondistended, nontender   Extremities: no LE edema, no cyanosis  Skin: no visible rash    Laboratory Studies:  CBC:                       8.1    11.78 )-----------( 148      ( 14 Mar 2025 07:13 )             26.0     WBC Trend:  11.78 03-14-25 @ 07:13  7.78 03-13-25 @ 00:16  6.57 03-12-25 @ 00:26  6.95 03-11-25 @ 00:37  7.13 03-10-25 @ 08:32  7.07 03-10-25 @ 00:18  11.82 03-09-25 @ 00:27  13.54 03-08-25 @ 01:11  20.24 03-08-25 @ 00:16    CMP: 03-14    141  |  106  |  50[H]  ----------------------------<  93  3.8   |  20[L]  |  2.67[H]    Ca    9.8      14 Mar 2025 07:14  Phos  3.2     03-14  Mg     2.0     03-14    TPro  6.8  /  Alb  2.9[L]  /  TBili  0.5  /  DBili  x   /  AST  50[H]  /  ALT  37  /  AlkPhos  251[H]  03-14    Creatinine: 2.67 mg/dL (03-14-25 @ 07:14)  Creatinine: 3.06 mg/dL (03-13-25 @ 00:16)  Creatinine: 2.81 mg/dL (03-12-25 @ 00:26)  Creatinine: 3.00 mg/dL (03-11-25 @ 00:36)  Creatinine: 2.95 mg/dL (03-10-25 @ 00:18)  Creatinine: 2.92 mg/dL (03-09-25 @ 00:27)  Creatinine: 2.40 mg/dL (03-08-25 @ 00:16)    LIVER FUNCTIONS - ( 14 Mar 2025 07:14 )  Alb: 2.9 g/dL / Pro: 6.8 g/dL / ALK PHOS: 251 U/L / ALT: 37 U/L / AST: 50 U/L / GGT: x           Microbiology: reviewed   Culture - Urine (collected 03-09-25 @ 18:14)  Source: Catheterized Catheterized  Final Report (03-11-25 @ 13:23):    >100,000 CFU/ml Pseudomonas aeruginosa  Organism: Pseudomonas aeruginosa (03-11-25 @ 13:23)  Organism: Pseudomonas aeruginosa (03-11-25 @ 13:23)      Method Type: MARIELENA      -  Amikacin: S <=16      -  Aztreonam: R >16      -  Cefepime: I 16      -  Ceftazidime: R >16      -  Ciprofloxacin: S <=0.25      -  Imipenem: S <=1      -  Levofloxacin: S <=0.5      -  Meropenem: S <=1      -  Piperacillin/Tazobactam: R >64    Culture - Bronchial (collected 03-08-25 @ 15:27)  Source: Bronchial Bronchial Lavage  Gram Stain (03-09-25 @ 00:20):    Moderate polymorphonuclear leukocytes per low power field    No Squamous epithelial cells per low power field    Moderate Gram Negative Rods per oil power field    Few Gram positive cocci in pairs per oil power field  Final Report (03-10-25 @ 15:13):    Moderate Pseudomonas aeruginosa    Commensal lucia consistent with body site  Organism: Pseudomonas aeruginosa (03-10-25 @ 15:13)  Organism: Pseudomonas aeruginosa (03-10-25 @ 15:13)      Method Type: MARIELENA      -  Aztreonam: S 8      -  Cefepime: S 8      -  Ceftazidime: S 8      -  Ciprofloxacin: S <=0.25      -  Imipenem: S <=1      -  Levofloxacin: S <=0.5      -  Meropenem: S <=1      -  Piperacillin/Tazobactam: R 64    Culture - Blood (collected 03-08-25 @ 00:00)  Source: Blood Blood  Final Report (03-13-25 @ 04:00):    No growth at 5 days    Culture - Blood (collected 03-07-25 @ 23:30)  Source: Blood Blood  Final Report (03-13-25 @ 04:00):    No growth at 5 days    Culture - Sputum (collected 03-07-25 @ 18:42)  Source: Sputum Sputum  Gram Stain (03-08-25 @ 07:01):    Rare polymorphonuclear leukocytes per low power field    Few Squamous epithelial cells per low power field    Moderate Gram Negative Rods seen per oil power field  Final Report (03-09-25 @ 16:20):    Numerous Pseudomonas aeruginosa    Commensal lucia consistent with body site  Organism: Pseudomonas aeruginosa (03-09-25 @ 16:20)  Organism: Pseudomonas aeruginosa (03-09-25 @ 16:20)      Method Type: MARIELENA      -  Aztreonam: S 8      -  Cefepime: S 8      -  Ceftazidime: S 8      -  Ciprofloxacin: S <=0.25      -  Imipenem: S <=1      -  Levofloxacin: S <=0.5      -  Meropenem: S <=1      -  Piperacillin/Tazobactam: R 64    Culture - Eye (collected 03-05-25 @ 16:15)  Source: Eye Conjunctiva-Right  Gram Stain (03-08-25 @ 00:03):    No polymorphonuclear cells seen    No organisms seen    by cytocentrifuge  Final Report (03-10-25 @ 19:22):    Rare Staphylococcus epidermidis  Organism: Staphylococcus epidermidis (03-10-25 @ 19:22)      Method Type: MARIELENA      -  Clindamycin: R 4      -  Erythromycin: S <=0.25      -  Gentamicin: S <=4 Should not be used as monotherapy      -  Oxacillin: R 2      -  Penicillin: R 1      -  Rifampin: S <=1 Should not be used as monotherapy      -  Tetracycline: S <=4      -  Trimethoprim/Sulfamethoxazole: S <=0.5/9.5      -  Vancomycin: S 1  Organism: Staphylococcus epidermidis (03-10-25 @ 19:22)          Radiology: reviewed     Medications:  acetaminophen     Tablet .. 650 milliGRAM(s) Oral every 6 hours PRN  albuterol/ipratropium for Nebulization 3 milliLiter(s) Nebulizer every 6 hours  aluminum hydroxide/magnesium hydroxide/simethicone Suspension 30 milliLiter(s) Oral every 4 hours PRN  artificial tears (preservative free) Ophthalmic Solution 1 Drop(s) Both EYES every 6 hours PRN  atorvastatin 20 milliGRAM(s) Oral at bedtime  chlorhexidine 0.12% Liquid 15 milliLiter(s) Oral Mucosa every 12 hours  cholecalciferol 1000 Unit(s) Oral daily  epoetin krystyna-epbx (RETACRIT) Injectable 49380 Unit(s) SubCutaneous <User Schedule>  escitalopram 15 milliGRAM(s) Oral with breakfast  gabapentin Solution 150 milliGRAM(s) Oral every 12 hours  influenza  Vaccine (HIGH DOSE) 0.5 milliLiter(s) IntraMuscular once  lacosamide IVPB 200 milliGRAM(s) IV Intermittent every 12 hours  lamoTRIgine 50 milliGRAM(s) Oral two times a day  lanolin Ointment 1 Application(s) Topical daily  levETIRAcetam  IVPB 750 milliGRAM(s) IV Intermittent every 12 hours  levoFLOXacin IVPB      levoFLOXacin IVPB 750 milliGRAM(s) IV Intermittent every 48 hours  lurasidone 20 milliGRAM(s) Oral at bedtime  melatonin 3 milliGRAM(s) Oral at bedtime PRN  nystatin Powder 1 Application(s) Topical every 8 hours  ondansetron Injectable 4 milliGRAM(s) IV Push every 8 hours PRN  pantoprazole  Injectable 40 milliGRAM(s) IV Push daily  polyethylene glycol 3350 17 Gram(s) Oral daily  senna Syrup 10 milliLiter(s) Oral at bedtime  sevelamer carbonate Powder 1600 milliGRAM(s) Oral every 8 hours    Current Antimicrobials:  levoFLOXacin IVPB      levoFLOXacin IVPB 750 milliGRAM(s) IV Intermittent every 48 hours    Prior/Completed Antimicrobials:  levoFLOXacin IVPB  piperacillin/tazobactam IVPB.  piperacillin/tazobactam IVPB.-  piperacillin/tazobactam IVPB.-  piperacillin/tazobactam IVPB..  piperacillin/tazobactam IVPB..  piperacillin/tazobactam IVPB...  remdesivir  IVPB  remdesivir  IVPB  remdesivir  IVPB  vancomycin  IVPB.

## 2025-03-14 NOTE — PROGRESS NOTE ADULT - NUTRITIONAL ASSESSMENT
This patient has been assessed with a concern for Malnutrition and has been determined to have a diagnosis/diagnoses of Severe protein-calorie malnutrition.    This patient is being managed with:   Diet NPO with Tube Feed-  Tube Feeding Modality: Nasogastric  Jevity 1.2 Garth (JEVITY1.2RTH)  Total Volume for 24 Hours (mL): 1080  Continuous  Starting Tube Feed Rate {mL per Hour}: 10  Increase Tube Feed Rate by (mL): 10     Every 4 hours  Until Goal Tube Feed Rate (mL per Hour): 60  Tube Feed Duration (in Hours): 18  Tube Feed Start Time: 11:00  Entered: Mar 13 2025  4:10PM    Diet NPO after Midnight-     NPO Start Date: 13-Mar-2025   NPO Start Time: 23:59  Entered: Mar 13 2025  2:32PM

## 2025-03-14 NOTE — PROGRESS NOTE ADULT - NS ATTEND AMEND GEN_ALL_CORE FT
70 y/o M w/hx as above including metastatic testicular cancer, epilepsy on AEDs, chronic respiratory failure with hypoxia and hypercapnia on home O2, HFpEF admitted for acute on chronic respiratory failure with hypoxia and hypercapnia likely secondary to combination of COVID PNA, bacterial superinfection, and acute pulmonary edema due to acute on chronic HFpEF. Hypotension likely severe sepsis with septic shock. SHAGUFTA likely ATN +/- venous congestion. Repeat CT chest with bilateral GGOs possibly acute pulmonary edema vs early fibrosis vs residual COVID along with L basilar consolidation likely bacterial PNA vs atelectasis. Now s/p trach awaiting PEG.    # Acute on chronic respiratory failure with hypoxia and hypercapnia  # COVID PNA  # Bacterial PNA  # Acute pulmonary edema  # Acute on chronic HFpEF  # Hypotension  # Severe sepsis with septic shock  # SHAGUFTA  # Hypernatremia  # Epilepsy    - Continue AEDs  - Wean from vent as tolerated  - Trend Cr, avoid nephrotoxins  - Abx as per ID  - Completed course of dexamethasone for COVID, completed remdesivir  - Hypernatremia resolved  - Therapeutic AC on hold for Afib due to prior hgb drops, trial of restarting once hgb remains stable  - Will trial on prophylactic dose heparin  - Full code

## 2025-03-14 NOTE — PROGRESS NOTE ADULT - ASSESSMENT
69 year old male with a past medical history of hypertension, hyperlipemia VAZQUEZ (on CPAP at bedtime, NC 2 liters during the day), CKD stage 3, epilepsy, schizophrenia, developmental delay, metastatic testicular cancer (s/p orchiectomy, actively on chemotherapy, last dose of etoposide/cisplatin on 6/2024) presenting with worsening shortness of breath. Patient was recently hospitalized at [facility] from January 27th 2025 to February 6th 2025 for seizure and influenza virus infection, which required intubation. He was sent to rehab (originally from home) from hospital. He returns due to sudden onset shortness of breath and worsening oxygenation. Of note, he tested positive for COVID-19       1- SHAGUFTA on CKDIII  2- covid-19  3- anemia  4- hypotension  5- respiratory failure         SHAGUFTA suspected in setting of new infection. covid 19   cr and bun  at a plateau range   bp is in range   trend cr and lytes   hyperphosphatemia   renvela 1600 mg tid  anemia, trend hb add retacrit [id number] U tiw sq  d.w RCU  team when seen earlier

## 2025-03-14 NOTE — PROGRESS NOTE ADULT - SUBJECTIVE AND OBJECTIVE BOX
[facility] HEMATOLOGY/ONCOLOGY INPATIENT PROGRESS NOTE     Interval Hx:   03-14-25: Mr. Gr was seen at bedside today.    Meds:   MEDICATIONS  (STANDING):  albuterol/ipratropium for Nebulization 3 milliLiter(s) Nebulizer every 6 hours  atorvastatin 20 milliGRAM(s) Oral at bedtime  chlorhexidine 0.12% Liquid 15 milliLiter(s) Oral Mucosa every 12 hours  cholecalciferol 1000 Unit(s) Oral daily  epoetin krystyna-epbx (RETACRIT) Injectable 00842 Unit(s) SubCutaneous <User Schedule>  escitalopram 15 milliGRAM(s) Oral with breakfast  gabapentin Solution 150 milliGRAM(s) Oral every 12 hours  influenza  Vaccine (HIGH DOSE) 0.5 milliLiter(s) IntraMuscular once  lacosamide IVPB 200 milliGRAM(s) IV Intermittent every 12 hours  lamoTRIgine 50 milliGRAM(s) Oral two times a day  lanolin Ointment 1 Application(s) Topical daily  levETIRAcetam  IVPB 750 milliGRAM(s) IV Intermittent every 12 hours  levoFLOXacin IVPB      levoFLOXacin IVPB 750 milliGRAM(s) IV Intermittent every 48 hours  lurasidone 20 milliGRAM(s) Oral at bedtime  nystatin Powder 1 Application(s) Topical every 8 hours  pantoprazole  Injectable 40 milliGRAM(s) IV Push daily  polyethylene glycol 3350 17 Gram(s) Oral daily  senna Syrup 10 milliLiter(s) Oral at bedtime  sevelamer carbonate Powder 1600 milliGRAM(s) Oral every 8 hours    MEDICATIONS  (PRN):  acetaminophen     Tablet .. 650 milliGRAM(s) Oral every 6 hours PRN Temp greater or equal to 38C (100.4F), Mild Pain (1 - 3)  aluminum hydroxide/magnesium hydroxide/simethicone Suspension 30 milliLiter(s) Oral every 4 hours PRN Dyspepsia  artificial tears (preservative free) Ophthalmic Solution 1 Drop(s) Both EYES every 6 hours PRN Dry Eyes  melatonin 3 milliGRAM(s) Oral at bedtime PRN Insomnia  ondansetron Injectable 4 milliGRAM(s) IV Push every 8 hours PRN Nausea and/or Vomiting    Vital Signs Last 24 Hrs  T(C): 36.7 (14 Mar 2025 00:00), Max: 37.5 (13 Mar 2025 18:15)  T(F): 98 (14 Mar 2025 00:00), Max: 99.5 (13 Mar 2025 18:15)  HR: 89 (14 Mar 2025 02:50) (65 - 91)  BP: 163/91 (14 Mar 2025 00:00) (90/55 - 163/91)  BP(mean): 82 (13 Mar 2025 13:00) (68 - 92)  RR: 20 (14 Mar 2025 00:00) (18 - 22)  SpO2: 100% (14 Mar 2025 02:50) (95% - 100%)    Parameters below as of 14 Mar 2025 00:00  Patient On (Oxygen Delivery Method): ventilator    Physical Exam:  Gen: NAD, tracheostomy- intubated sedated   HEENT: EOMI, MMM  Chest: equal chest rise  Cardiac: regular   Abd: non distended    Labs:                        7.9    7.78  )-----------( 130      ( 13 Mar 2025 00:16 )             24.8     CBC Full  -  ( 13 Mar 2025 00:16 )  WBC Count : 7.78 K/uL  RBC Count : 2.53 M/uL  Hemoglobin : 7.9 g/dL  Hematocrit : 24.8 %  Platelet Count - Automated : 130 K/uL  Mean Cell Volume : 98.0 fl  Mean Cell Hemoglobin : 31.2 pg  Mean Cell Hemoglobin Concentration : 31.9 g/dL    03-13    141  |  105  |  58[H]  ----------------------------<  107[H]  4.3   |  21[L]  |  3.06[H]    Ca    9.6      13 Mar 2025 00:16  Phos  4.4     03-13  Mg     2.3     03-13    TPro  6.3  /  Alb  2.7[L]  /  TBili  0.4  /  DBili  x   /  AST  46[H]  /  ALT  38  /  AlkPhos  228[H]  03-13    PT/INR - ( 13 Mar 2025 00:16 )   PT: 12.4 sec;   INR: 1.09 ratio         PTT - ( 13 Mar 2025 00:16 )  PTT:22.9 sec   [facility] HEMATOLOGY/ONCOLOGY INPATIENT PROGRESS NOTE     Interval Hx:   03-14-25: Mr. Gr was seen at bedside today, awake and alert, mental status much improved, transferred from MICU to [address] [place] [address] 03/13/2025. Without significant events overnight, discussed with nursing staff at bedside, stated pt did well overnight, no signs of bleeding, no fever noted, medications provided via NGT and he is pending PEG-Tube placement today. His case is complex, noted with repeat need for intubations, discussed with pt, he is aware. Discussed with pts primary Oncologist as well.    Meds:   MEDICATIONS  (STANDING):  albuterol/ipratropium for Nebulization 3 milliLiter(s) Nebulizer every 6 hours  atorvastatin 20 milliGRAM(s) Oral at bedtime  chlorhexidine 0.12% Liquid 15 milliLiter(s) Oral Mucosa every 12 hours  cholecalciferol 1000 Unit(s) Oral daily  epoetin krystyna-epbx (RETACRIT) Injectable 41662 Unit(s) SubCutaneous <User Schedule>  escitalopram 15 milliGRAM(s) Oral with breakfast  gabapentin Solution 150 milliGRAM(s) Oral every 12 hours  influenza  Vaccine (HIGH DOSE) 0.5 milliLiter(s) IntraMuscular once  lacosamide IVPB 200 milliGRAM(s) IV Intermittent every 12 hours  lamoTRIgine 50 milliGRAM(s) Oral two times a day  lanolin Ointment 1 Application(s) Topical daily  levETIRAcetam  IVPB 750 milliGRAM(s) IV Intermittent every 12 hours  levoFLOXacin IVPB      levoFLOXacin IVPB 750 milliGRAM(s) IV Intermittent every 48 hours  lurasidone 20 milliGRAM(s) Oral at bedtime  nystatin Powder 1 Application(s) Topical every 8 hours  pantoprazole  Injectable 40 milliGRAM(s) IV Push daily  polyethylene glycol 3350 17 Gram(s) Oral daily  senna Syrup 10 milliLiter(s) Oral at bedtime  sevelamer carbonate Powder 1600 milliGRAM(s) Oral every 8 hours    MEDICATIONS  (PRN):  acetaminophen     Tablet .. 650 milliGRAM(s) Oral every 6 hours PRN Temp greater or equal to 38C (100.4F), Mild Pain (1 - 3)  aluminum hydroxide/magnesium hydroxide/simethicone Suspension 30 milliLiter(s) Oral every 4 hours PRN Dyspepsia  artificial tears (preservative free) Ophthalmic Solution 1 Drop(s) Both EYES every 6 hours PRN Dry Eyes  melatonin 3 milliGRAM(s) Oral at bedtime PRN Insomnia  ondansetron Injectable 4 milliGRAM(s) IV Push every 8 hours PRN Nausea and/or Vomiting    Vital Signs Last 24 Hrs  T(C): 36.7 (14 Mar 2025 00:00), Max: 37.5 (13 Mar 2025 18:15)  T(F): 98 (14 Mar 2025 00:00), Max: 99.5 (13 Mar 2025 18:15)  HR: 89 (14 Mar 2025 02:50) (65 - 91)  BP: 163/91 (14 Mar 2025 00:00) (90/55 - 163/91)  BP(mean): 82 (13 Mar 2025 13:00) (68 - 92)  RR: 20 (14 Mar 2025 00:00) (18 - 22)  SpO2: 100% (14 Mar 2025 02:50) (95% - 100%)    Parameters below as of 14 Mar 2025 00:00  Patient On (Oxygen Delivery Method): ventilator    Physical Exam:  Gen: NAD, tracheostomy with ETT  HEENT: EOMI, MMM  Chest: equal chest rise  Cardiac: regular   Abd: non distended    Labs:                        7.9    7.78  )-----------( 130      ( 13 Mar 2025 00:16 )             24.8     CBC Full  -  ( 13 Mar 2025 00:16 )  WBC Count : 7.78 K/uL  RBC Count : 2.53 M/uL  Hemoglobin : 7.9 g/dL  Hematocrit : 24.8 %  Platelet Count - Automated : 130 K/uL  Mean Cell Volume : 98.0 fl  Mean Cell Hemoglobin : 31.2 pg  Mean Cell Hemoglobin Concentration : 31.9 g/dL    03-13    141  |  105  |  58[H]  ----------------------------<  107[H]  4.3   |  21[L]  |  3.06[H]    Ca    9.6      13 Mar 2025 00:16  Phos  4.4     03-13  Mg     2.3     03-13    TPro  6.3  /  Alb  2.7[L]  /  TBili  0.4  /  DBili  x   /  AST  46[H]  /  ALT  38  /  AlkPhos  228[H]  03-13    PT/INR - ( 13 Mar 2025 00:16 )   PT: 12.4 sec;   INR: 1.09 ratio         PTT - ( 13 Mar 2025 00:16 )  PTT:22.9 sec

## 2025-03-14 NOTE — ADVANCED PRACTICE NURSE CONSULT - ASSESSMENT
Patient encountered on 5 RCU on 3/14/25. In with NEHEMIAS Stacy.    When wound care RN arrived on unit, patient was found lying in a low air loss pressure redistribution support surface style bed.. Mr. Gr was awake, but unable to communicate. Nods heads to yes / no questions. Staff assistance X 2 required for turning. Once turned, incontinence of urine was apparent, characterized by foul-smelling urine on the skin, wet gown, wet sheets, wet underpad, and reg care was provided. Once reg care was provided, I was able to view his skin. Urinary incontinence documented in patient chart. Patient is incontinent of stool. Patient with a condom catheter for urinary diversion    Skin assessment reveals; Sacrum / B/L buttocks skin changes consistent with MASD (moisture-associated skin dermatitis) characterized by hyperpigmentation, redness, ranging from light pink to dark red measuring approximately 17.0 cm x 16.0 cm x 0.0 cm. Erythematous and macerated skin noted on perineum, buttocks, and inner thighs. No eroded skin. Satellite lesions present. The patient is unable to complain of burning, and itching. Cleansed w/ incontinent cleanser, pat dry & applied Darrius Antifungal Cream at bedside. Once the consultation was complete, patient and RN were educated regarding the need of prompt and thorough cleansing of the skin after each incontinent episode. Recommended using a pH-balanced skin cleanser and avoiding harsh soaps or detergents. Advised against scrubbing the skin vigorously. Instructed to pat the skin dry gently. Patient appropriately offloaded with pillow positioner devices.     Skin assessment reveals; New wound of the mid lower back, the wound bed is pink, partial thickness, and moist. Measuring approximately 1.5 cm x 2.0 cm x 0.1 cm. Cleansed with normal saline 0.9%, pat dry, and applied Adaptic Touch to wound bed, covered with Allevyn Foam bordered dressing.      When consultation was completed, the patient was left in a right sided position, with side rails up, call bell within reach, and bed in lowest position. Plan of care discussed with Tawanna (NEHEMIAS).

## 2025-03-14 NOTE — PROGRESS NOTE ADULT - PROBLEM SELECTOR PLAN 11
- Full Code  - Contact: Sister Ester [phone] - Full Code  - Contact: Sister Ester [phone]  - Sister updated by attending physician on 3/14

## 2025-03-14 NOTE — PROGRESS NOTE ADULT - PROBLEM SELECTOR PLAN 9
SHAGUFTA suspected in setting of new infection Covid 19      - hyperphosphatemia   Renvela 1600 mg tid  - trend hb add Retacrit 21174 U tiw sq  for trach SHAGUFTA on CKD stage 3 suspected in setting of new infection Covid 19   - CR peaked to 4.34 on 3/3  - hyperphosphatemia, continue Renvela 1600 mg tid  - trend hb add Retacrit 56555 U tiw sq  for trach

## 2025-03-14 NOTE — PROGRESS NOTE ADULT - SUBJECTIVE AND OBJECTIVE BOX
[facility]   [phone]  RENAL FOLLOW UP NOTE  --------------------------------------------------------------------------------  Chief Complaint:    24 hour events/subjective:    seen earlier   trach   vent     PAST HISTORY  --------------------------------------------------------------------------------  No significant changes to PMH, PSH, FHx, SHx, unless otherwise noted    ALLERGIES & MEDICATIONS  --------------------------------------------------------------------------------  Allergies    seasonal allergies (Unknown)  No Known Drug Allergies    Intolerances    latex (Rash)    Standing Inpatient Medications  albuterol/ipratropium for Nebulization 3 milliLiter(s) Nebulizer every 6 hours  atorvastatin 20 milliGRAM(s) Oral at bedtime  chlorhexidine 0.12% Liquid 15 milliLiter(s) Oral Mucosa every 12 hours  cholecalciferol 1000 Unit(s) Oral daily  epoetin krystyna-epbx (RETACRIT) Injectable 10824 Unit(s) SubCutaneous <User Schedule>  escitalopram 15 milliGRAM(s) Oral with breakfast  gabapentin Solution 150 milliGRAM(s) Oral every 12 hours  influenza  Vaccine (HIGH DOSE) 0.5 milliLiter(s) IntraMuscular once  lacosamide IVPB 200 milliGRAM(s) IV Intermittent every 12 hours  lamoTRIgine 50 milliGRAM(s) Oral two times a day  lanolin Ointment 1 Application(s) Topical daily  levETIRAcetam  IVPB 750 milliGRAM(s) IV Intermittent every 12 hours  levoFLOXacin IVPB      levoFLOXacin IVPB 750 milliGRAM(s) IV Intermittent every 48 hours  lurasidone 20 milliGRAM(s) Oral at bedtime  nystatin Powder 1 Application(s) Topical every 8 hours  pantoprazole  Injectable 40 milliGRAM(s) IV Push daily  polyethylene glycol 3350 17 Gram(s) Oral daily  senna Syrup 10 milliLiter(s) Oral at bedtime  sevelamer carbonate Powder 1600 milliGRAM(s) Oral every 8 hours    PRN Inpatient Medications  acetaminophen     Tablet .. 650 milliGRAM(s) Oral every 6 hours PRN  aluminum hydroxide/magnesium hydroxide/simethicone Suspension 30 milliLiter(s) Oral every 4 hours PRN  artificial tears (preservative free) Ophthalmic Solution 1 Drop(s) Both EYES every 6 hours PRN  melatonin 3 milliGRAM(s) Oral at bedtime PRN  ondansetron Injectable 4 milliGRAM(s) IV Push every 8 hours PRN      REVIEW OF SYSTEMS  --------------------------------------------------------------------------------      VITALS/PHYSICAL EXAM  --------------------------------------------------------------------------------  T(C): 36.8 (03-14-25 @ 17:11), Max: 37.2 (03-14-25 @ 05:39)  HR: 79 (03-14-25 @ 21:35) (74 - 100)  BP: 115/61 (03-14-25 @ 17:11) (115/61 - 178/85)  RR: 18 (03-14-25 @ 17:11) (16 - 20)  SpO2: 98% (03-14-25 @ 21:35) (95% - 100%)  Wt(kg): --  Height (cm): 167.6 (03-14-25 @ 11:04)  Weight (kg): 89.1 (03-14-25 @ 11:04)  BMI (kg/m2): 31.7 (03-14-25 @ 11:04)  BSA (m2): 1.98 (03-14-25 @ 11:04)      03-13-25 @ 07:01  -  03-14-25 @ 07:00  --------------------------------------------------------  IN: 216.8 mL / OUT: 820 mL / NET: -603.2 mL    03-14-25 @ 07:01  -  03-14-25 @ 22:24  --------------------------------------------------------  IN: 0 mL / OUT: 400 mL / NET: -400 mL      Physical Exam:  	    Gen: ill appearing male,  remains intubated  	Pulm: decrease bs, +coarse bs    	CV: No JVD. RRR, S1S2; no rub  	Abd: +BS, soft, nondistended  	UE: Warm, no cyanosis  no clubbing,  no edema;  	LE: Warm, no cyanosis  no clubbing, no edema    LABS/STUDIES  --------------------------------------------------------------------------------              8.1    11.78 >-----------<  148      [03-14-25 @ 07:13]              26.0     141  |  106  |  50  ----------------------------<  93      [03-14-25 @ 07:14]  3.8   |  20  |  2.67        Ca     9.8     [03-14-25 @ 07:14]      Mg     2.0     [03-14-25 @ 07:14]      Phos  3.2     [03-14-25 @ 07:14]    TPro  6.8  /  Alb  2.9  /  TBili  0.5  /  DBili  x   /  AST  50  /  ALT  37  /  AlkPhos  251  [03-14-25 @ 07:14]    PT/INR: PT 12.4 , INR 1.09       [03-13-25 @ 00:16]  PTT: 22.9       [03-13-25 @ 00:16]      Creatinine Trend:  SCr 2.67 [03-14 @ 07:14]  SCr 3.06 [03-13 @ 00:16]  SCr 2.81 [03-12 @ 00:26]  SCr 3.00 [03-11 @ 00:36]  SCr 2.95 [03-10 @ 00:18]      Ferritin 5943      [02-23-25 @ 06:15]  TSH 0.87      [01-25-25 @ 11:31]

## 2025-03-14 NOTE — ADVANCED PRACTICE NURSE CONSULT - RECOMMEDATIONS
Impression      Urinary incontinence    Fecal incontinence  Bilateral sacrum/buttock [MASD]- Erythematous and macerated skin noted on perineum, buttocks, and inner thighs   Mid - Lower back - Skin tear        Recommendations       1. Bilateral sacrum/buttock - MASD     Topical therapy- sacral/bilateral buttocks- cleanse w/incontinent cleanser, pat dry & apply Darrius Antifungal twice daily & PRN Soiling. Monitor for changes       2. Incontinent management - incontinent cleanser, pads, reg care BID      3. Maintain on an alternating air with low air loss surface       4. Turn & reposition every 2 hr.; Use positioning pillow to turn and reposition, soft pillow between bony prominences; continue measures to decrease friction/shear/pressure.     5. Nutrition optimization.      6. Place waffle cushion when out of bed to chair.         7. Mid Lower Back  Topical Therapy: Cleanse with normal saline 0.9%, pat dry, and apply Adaptic Touch Semi-permeable dressing to wound bed. Cover with Allevyn Foam border. Frequency: Daily & PRN Soiling

## 2025-03-14 NOTE — PROGRESS NOTE ADULT - PROBLEM SELECTOR PLAN 8
- Continue enteral feeds  - NPO after midnight 3/13  - plan for PEG 3/14  - Continue Senna and Miralax - Kept NPO overnight for peg placement   - S/p Peg placement 3/14. EGD with normal findings. Patient cleared of meds only via peg and GI will reassess tomorrow on when tube feeds can be resumed.  - Continue Senna and Miralax

## 2025-03-14 NOTE — PROGRESS NOTE ADULT - PROBLEM SELECTOR PLAN 6
Chronic heart failure with preserved ejection fraction.   ·  Plan: TTE done here 1/17/25 technically difficult, but LV systolic fxn appears nl without any regional wall motion abnormalities  - Repeat TTE pending  - BNP 5000 <---1100 but appears euvolemic   - s/p bumex gtt per ICU

## 2025-03-14 NOTE — PRE PROCEDURE NOTE - PRE PROCEDURE EVALUATION
Attending Physician:             Neema Watson               Procedure: PEG    Indication for Procedure: dysphagia  ________________________________________________________  PAST MEDICAL & SURGICAL HISTORY:  Hypertension, unspecified type      Other hyperlipidemia      History of epilepsy      Paranoid schizophrenia      Obstructive sleep apnea      Testicular cancer      H/O Spinal surgery        ALLERGIES:  latex (Rash)  seasonal allergies (Unknown)  No Known Drug Allergies    HOME MEDICATIONS:  atorvastatin 20 mg oral tablet: 1 tab(s) orally once a day (at bedtime)  cholecalciferol 25 mcg (1000 intl units) oral tablet: 1 tab(s) orally once a day  escitalopram 5 mg oral tablet: 3 tab(s) orally once a day  gabapentin 250 mg/5 mL oral solution: 3 milliliter(s) orally 2 times a day  ipratropium-albuterol 0.5 mg-2.5 mg/3 mL inhalation solution: 3 milliliter(s) inhaled every 6 hours  lacosamide 100 mg oral tablet: 1 tab(s) orally 2 times a day  lamoTRIgine 100 mg oral tablet: 1 tab(s) orally 2 times a day  levETIRAcetam 750 mg oral tablet: 1 tab(s) orally 2 times a day  lidocaine 4% topical film: Apply topically to affected area once a day  lurasidone 40 mg oral tablet: 1 tab(s) orally once a day  nystatin 100,000 units/g topical powder: 1 Apply topically to affected area 3 times a day As needed fungal infection you may see and want to treat  pantoprazole 40 mg oral delayed release tablet: 1 tab(s) orally once a day (before a meal)  polyethylene glycol 3350 oral powder for reconstitution: 17 gram(s) orally once a day  senna leaf extract oral tablet: 2 tab(s) orally once a day (at bedtime)    AICD/PPM: [ ] yes   [ ] no    PERTINENT LAB DATA:                        8.1    11.78 )-----------( 148      ( 14 Mar 2025 07:13 )             26.0     03-14    141  |  106  |  50[H]  ----------------------------<  93  3.8   |  20[L]  |  2.67[H]    Ca    9.8      14 Mar 2025 07:14  Phos  3.2     03-14  Mg     2.0     03-14    TPro  6.8  /  Alb  2.9[L]  /  TBili  0.5  /  DBili  x   /  AST  50[H]  /  ALT  37  /  AlkPhos  251[H]  03-14    PT/INR - ( 13 Mar 2025 00:16 )   PT: 12.4 sec;   INR: 1.09 ratio         PTT - ( 13 Mar 2025 00:16 )  PTT:22.9 sec            PHYSICAL EXAMINATION:    T(C): 37.2  HR: 82  BP: 155/88  RR: 20  SpO2: 100%    Constitutional: NAD  HEENT: PERRLA, EOMI,    Neck:  No JVD  Respiratory: CTAB/L  Cardiovascular: S1 and S2  Gastrointestinal: BS+, soft, NT/ND  Extremities: No peripheral edema  Neurological: A/O x 3, no focal deficits  Psychiatric: Normal mood, normal affect  Skin: No rashes    ASA Class: I [ ]  II [ ]  III [ ]  IV [ ]    COMMENTS:    The patient is a suitable candidate for the planned procedure unless box checked [ ]  No, explain:

## 2025-03-14 NOTE — PROGRESS NOTE ADULT - PROBLEM SELECTOR PLAN 4
Continue with home meds:  -Lacosamide, Lurasidone, Lamotrigine  - EEG negative Continue with home meds:  -Lacosamide, Lurasidone, Lamotrigine  - Multiple EEG's negative no seizure, recently done 3/4

## 2025-03-15 LAB
ANION GAP SERPL CALC-SCNC: 15 MMOL/L — SIGNIFICANT CHANGE UP (ref 5–17)
BUN SERPL-MCNC: 38 MG/DL — HIGH (ref 7–23)
CALCIUM SERPL-MCNC: 9.9 MG/DL — SIGNIFICANT CHANGE UP (ref 8.4–10.5)
CHLORIDE SERPL-SCNC: 106 MMOL/L — SIGNIFICANT CHANGE UP (ref 96–108)
CO2 SERPL-SCNC: 20 MMOL/L — LOW (ref 22–31)
CREAT SERPL-MCNC: 2.28 MG/DL — HIGH (ref 0.5–1.3)
EGFR: 31 ML/MIN/1.73M2 — LOW
EGFR: 31 ML/MIN/1.73M2 — LOW
GLUCOSE BLDC GLUCOMTR-MCNC: 104 MG/DL — HIGH (ref 70–99)
GLUCOSE BLDC GLUCOMTR-MCNC: 91 MG/DL — SIGNIFICANT CHANGE UP (ref 70–99)
GLUCOSE BLDC GLUCOMTR-MCNC: 92 MG/DL — SIGNIFICANT CHANGE UP (ref 70–99)
GLUCOSE BLDC GLUCOMTR-MCNC: 95 MG/DL — SIGNIFICANT CHANGE UP (ref 70–99)
GLUCOSE BLDC GLUCOMTR-MCNC: 96 MG/DL — SIGNIFICANT CHANGE UP (ref 70–99)
GLUCOSE SERPL-MCNC: 85 MG/DL — SIGNIFICANT CHANGE UP (ref 70–99)
HCT VFR BLD CALC: 25.5 % — LOW (ref 39–50)
HGB BLD-MCNC: 8.1 G/DL — LOW (ref 13–17)
MAGNESIUM SERPL-MCNC: 1.9 MG/DL — SIGNIFICANT CHANGE UP (ref 1.6–2.6)
MCHC RBC-ENTMCNC: 30.8 PG — SIGNIFICANT CHANGE UP (ref 27–34)
MCHC RBC-ENTMCNC: 31.8 G/DL — LOW (ref 32–36)
MCV RBC AUTO: 97 FL — SIGNIFICANT CHANGE UP (ref 80–100)
NRBC BLD AUTO-RTO: 0 /100 WBCS — SIGNIFICANT CHANGE UP (ref 0–0)
PHOSPHATE SERPL-MCNC: 3.1 MG/DL — SIGNIFICANT CHANGE UP (ref 2.5–4.5)
PLATELET # BLD AUTO: 151 K/UL — SIGNIFICANT CHANGE UP (ref 150–400)
POTASSIUM SERPL-MCNC: 3.5 MMOL/L — SIGNIFICANT CHANGE UP (ref 3.5–5.3)
POTASSIUM SERPL-SCNC: 3.5 MMOL/L — SIGNIFICANT CHANGE UP (ref 3.5–5.3)
RBC # BLD: 2.63 M/UL — LOW (ref 4.2–5.8)
RBC # FLD: 16.2 % — HIGH (ref 10.3–14.5)
SODIUM SERPL-SCNC: 141 MMOL/L — SIGNIFICANT CHANGE UP (ref 135–145)
WBC # BLD: 7.74 K/UL — SIGNIFICANT CHANGE UP (ref 3.8–10.5)
WBC # FLD AUTO: 7.74 K/UL — SIGNIFICANT CHANGE UP (ref 3.8–10.5)

## 2025-03-15 PROCEDURE — 93010 ELECTROCARDIOGRAM REPORT: CPT

## 2025-03-15 PROCEDURE — 99232 SBSQ HOSP IP/OBS MODERATE 35: CPT | Mod: GC

## 2025-03-15 RX ORDER — ENOXAPARIN SODIUM 100 MG/ML
40 INJECTION SUBCUTANEOUS EVERY 24 HOURS
Refills: 0 | Status: DISCONTINUED | OUTPATIENT
Start: 2025-03-15 | End: 2025-03-18

## 2025-03-15 RX ADMIN — NYSTATIN 1 APPLICATION(S): 100000 CREAM TOPICAL at 05:09

## 2025-03-15 RX ADMIN — SEVELAMER HYDROCHLORIDE 1600 MILLIGRAM(S): 800 TABLET ORAL at 05:08

## 2025-03-15 RX ADMIN — Medication 1 APPLICATION(S): at 13:17

## 2025-03-15 RX ADMIN — LAMOTRIGINE 50 MILLIGRAM(S): 150 TABLET ORAL at 17:28

## 2025-03-15 RX ADMIN — LEVETIRACETAM 400 MILLIGRAM(S): 10 INJECTION, SOLUTION INTRAVENOUS at 05:05

## 2025-03-15 RX ADMIN — IPRATROPIUM BROMIDE AND ALBUTEROL SULFATE 3 MILLILITER(S): .5; 2.5 SOLUTION RESPIRATORY (INHALATION) at 06:04

## 2025-03-15 RX ADMIN — SEVELAMER HYDROCHLORIDE 1600 MILLIGRAM(S): 800 TABLET ORAL at 21:34

## 2025-03-15 RX ADMIN — IPRATROPIUM BROMIDE AND ALBUTEROL SULFATE 3 MILLILITER(S): .5; 2.5 SOLUTION RESPIRATORY (INHALATION) at 23:27

## 2025-03-15 RX ADMIN — Medication 650 MILLIGRAM(S): at 15:16

## 2025-03-15 RX ADMIN — ESCITALOPRAM OXALATE 15 MILLIGRAM(S): 20 TABLET ORAL at 08:46

## 2025-03-15 RX ADMIN — Medication 40 MILLIGRAM(S): at 12:20

## 2025-03-15 RX ADMIN — ENOXAPARIN SODIUM 40 MILLIGRAM(S): 100 INJECTION SUBCUTANEOUS at 17:52

## 2025-03-15 RX ADMIN — NYSTATIN 1 APPLICATION(S): 100000 CREAM TOPICAL at 13:44

## 2025-03-15 RX ADMIN — Medication 1000 UNIT(S): at 12:20

## 2025-03-15 RX ADMIN — GABAPENTIN 150 MILLIGRAM(S): 400 CAPSULE ORAL at 05:07

## 2025-03-15 RX ADMIN — Medication 1 APPLICATION(S): at 01:46

## 2025-03-15 RX ADMIN — Medication 10 MILLILITER(S): at 21:35

## 2025-03-15 RX ADMIN — POLYETHYLENE GLYCOL 3350 17 GRAM(S): 17 POWDER, FOR SOLUTION ORAL at 12:20

## 2025-03-15 RX ADMIN — Medication 15 MILLILITER(S): at 17:29

## 2025-03-15 RX ADMIN — Medication 15 MILLILITER(S): at 05:08

## 2025-03-15 RX ADMIN — ATORVASTATIN CALCIUM 20 MILLIGRAM(S): 80 TABLET, FILM COATED ORAL at 21:33

## 2025-03-15 RX ADMIN — Medication 650 MILLIGRAM(S): at 16:16

## 2025-03-15 RX ADMIN — LURASIDONE HYDROCHLORIDE 20 MILLIGRAM(S): 120 TABLET, FILM COATED ORAL at 21:33

## 2025-03-15 RX ADMIN — SEVELAMER HYDROCHLORIDE 1600 MILLIGRAM(S): 800 TABLET ORAL at 13:44

## 2025-03-15 RX ADMIN — LEVETIRACETAM 400 MILLIGRAM(S): 10 INJECTION, SOLUTION INTRAVENOUS at 17:28

## 2025-03-15 RX ADMIN — Medication 650 MILLIGRAM(S): at 22:56

## 2025-03-15 RX ADMIN — IPRATROPIUM BROMIDE AND ALBUTEROL SULFATE 3 MILLILITER(S): .5; 2.5 SOLUTION RESPIRATORY (INHALATION) at 11:04

## 2025-03-15 RX ADMIN — EPOETIN ALFA 10000 UNIT(S): 10000 SOLUTION INTRAVENOUS; SUBCUTANEOUS at 13:13

## 2025-03-15 RX ADMIN — NYSTATIN 1 APPLICATION(S): 100000 CREAM TOPICAL at 21:35

## 2025-03-15 RX ADMIN — LACOSAMIDE 140 MILLIGRAM(S): 150 TABLET, FILM COATED ORAL at 05:07

## 2025-03-15 RX ADMIN — GABAPENTIN 150 MILLIGRAM(S): 400 CAPSULE ORAL at 17:52

## 2025-03-15 RX ADMIN — LAMOTRIGINE 50 MILLIGRAM(S): 150 TABLET ORAL at 05:10

## 2025-03-15 RX ADMIN — IPRATROPIUM BROMIDE AND ALBUTEROL SULFATE 3 MILLILITER(S): .5; 2.5 SOLUTION RESPIRATORY (INHALATION) at 17:08

## 2025-03-15 RX ADMIN — LACOSAMIDE 140 MILLIGRAM(S): 150 TABLET, FILM COATED ORAL at 17:52

## 2025-03-15 NOTE — PROGRESS NOTE ADULT - ASSESSMENT
Mr. Gr is a 69 year old male with PMHx of seizure disorder, sleep apnea, HTN, chronic idiopathic constipation, stage III CKD, severe obesity, secondary hyperparathyroidism, anemia, thrombocytopenia, hyperlipidemia, testicular cancer under treatment with Dr. Ovalles who is admitted for acute hypoxic respiratory failure secondary to COVID vs superimposed pneumonia with new onset thrombocytopenia. He was recently discharged 02/06/2025 after a tenous stay including intubation in the ICU for respiratory failure in setting of seizure. He had recovered and was discharged to rehab.      Former smoker, quit 14y prior, denied ETOH, drug use. Lives at home. Does not have children. Denies family history of blood disorders or malignancy.  Denies previous workplace related exposures.  Denies previous history of blood transfusions.  Denied history of thromboses.  Denied history of  requiring anticoagulation.     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  Margins were negative.  pT3Nx. CT C/A/P with few left para-aortic and left iliac chain lymph nodes largest measuring 8.1 cm left para-aortic node, bilateral subcentimeter noncalcified pulmonary nodules measuring up to 7 mm. AFP, LDH, hCG were all elevated. 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 Started on adjuvant therapy with Cisplatin+Etoposide, so far completed 4 cycles of treatment with cisplatin dose reduced 50% and Etoposide 50% due to thrombocytopenia.      02/19/2025: on HFNC, noted tachycardia and fever, Klebsiella in urine as well, thrombocytopenia stable/improving, no signs of active bleeding     02/20/2025: developed A.fib with RVR and was placed on heparin drip and pending cardiology eval    02/21/2025: awake, on AVAPS overnight, noted RRT for hypoxia as pt removed HFNC at some point in time, good response thereafter     02/22/2025:  continues on AVAPS this morning, noted RRT overnight for hypoxia, hypercapnia, ICU following, US LE negative for DVT, counts overall stable/improved     02/23/2025: progressive respiratory failure, noted ongoing RTT this morning with pending intubation and transfer to MICU     02/24/2025: intubated 02/23/2025, sedated, on pressor support, no signs of bleeding, counts noted, no signs of bleeding     02/25/2025: continues in MICU, intubated and sedated, no signs of bleeding    02/26/2025: continues in MICU, intubated, without signs of bleeding, continues on heparin drip     02/27/2025:  remains under the care of the ICU, intubated, no signs of bleeding and continues on heparin drip, counts stable, has not required PRBC support this admission    02/28/2025: continues under the care of MICU, continues intubated, no signs of bleeding, on heparin drip    03/01/2025: continues in MICU, intubated, direct josefina IgG positive, eluate negative, not likely to be clinically significant     03/02/2025:  continues in MICU, nursing staff at bedside, no overnight events noted. CTH without acute intracranial pathology     03/03/2025: continues in MICU, intubated, on heparin drip, 1u PRBC overnight, no signs of bleeding, platelet count stable     03/04/2025: awake, moving arms spontaneously on exam, continues without much interaction however. No signs of bleeding, continues heparin drip, continues intubated in MICU     03/05/2025: extubated 03/04/2025, continues in MICU, awake and alert this morning and able to speak full sentences, discussed/reviewed findings, continues on heparin drip     03/06/2025: nursing staff at bedside, bipap overnight, without signs of bleeding, counts noted stable, renal function improving     03/07/2025:  no signs of bleeding, counts noted, continues heparin drip, continues in MICU    03/08/2025: on HFNC, no signs of bleeding, although alert and answering questions, not back to his baseline yet, continues in MICU    03/09/2025: continues in MICU, s/p re-intubation 03/08/2025 given respiratory compromise in the setting of copious secretions as evidenced by bronchoscopy, in setting of fever, now sedated, mild crusting of blood around mouth, without active sign of bleeding, counts noted reviewed, continues on heparin drip     03/10/2025: continues in MICU, no signs of bleeding, discussed with nursing staff at bedside, receiving 1u PRBC due to H/H downtrend, sputum culture with Pseudomonas, ICU and ID recs noted, continues to be intubated     03/11/2025: continues in MICU, intubated and sedated, noted counts, without signs of bleeding, appropriate response to transfusion    03/12/2025: continues in MICU, intubated and sedated, without signs of bleeding     03/13/2025: continues in MICU, s/p Tracheostomy 03/12/2025 with tracheal intubation, continues with sedation     03/14/2025:  awake and alert, mental status much improved, transferred from MICU to [address] [place] [address] 03/13/2025. Without significant events overnight, discussed with nursing staff at bedside, stated pt did well overnight, no signs of bleeding, no fever noted, medications provided via NGT and he is pending PEG-Tube placement today. His case is complex, noted with repeat need for intubations, discussed with pt, he is aware. Discussed with pts primary Oncologist as well.    #Acute hypoxic respiratory failure, Acute, Severe   # COVID  - CT noted with bilateral GGO  - ID following  - Pulmonary following  - Antibiotics per primary team/MICU and ID   - Intubated 02/23/2025 AM, MICU   - extubated 03/04/2025  - Pseudomonas from sputum culture   - Re-intubated 03/09/2025 due to increased work of breathing in setting of increased secretions, not protecting airway, tachypnea, hypoxia   - s/p Tracheostomy 03/12/2025      # Thrombocytopenia, Acute, Moderate   - Did occur during most recent admission and improved to normal prior to discharge   - Without signs of bleeding  - Could be multifactorial, possibly due to infection   - Continue to trend  - Transfuse to maintain Plt > 10k unless actively bleeding then maintain > 50k     # Brain lesion, Acute, Severe   - pt with hx of seizures  - MRI brain now with 5.4 mm enhancing lesion in the LEFT middle cerebellar peduncle with associated edema suspicious for metastases  - MRI Lumbar negative for acute disease  - Small lesion without mass effect or edema, recommended to correlate per neurology if foci and locus are concordant with seizure activity  - Neurology recs noted, new lesion not likely to cause seizure  - It is rare, but nonetheless not impossible, for testicular cancer to be metastatic to the brain  - He will need another PET-CT, can be completed outpatient once stable if concern can proceed with biopsy at that time   - Previous endocrinology eval for thyroid nodule noted  - AFP/BHCH normal,  previously   - Repeat CTH without acute intracranial pathology       # 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 Ovalles, agree with current plan  - Follow up as outpatient with Franki Ovalles MD     # Acute kidney injury on CKD Stage IIIA, Acute, Moderate   - Likely multifactorial  - Nephrology consult and recs noted  - Continue to trend     # Normocytic anemia, Acute, Severe   - Chronic, has hx of PRBC during chemo 07/2024  - Noted Anti E, Anti-K Anti-C antibodies previously  - direct josefina IgG positive, eluate negative, not likely to be clinically significant   - s/p 2u PRBC 03/03/2025 and 03/10/2025   - Monitor for bleeding  - Recommend to transfuse to maintain Hb > 7    # Klebsiella UTI, Acute, Severe > Moderate   # Pseudomonas UTI   -Antibiotics per ID and primary team       Recommendations  - Trend CBC daily, obtain post transfusion CBC today   - Transfuse to maintain Hb > 7   - Transfuse to maintain Plt >10k unless active bleeding then >50k   - Monitor renal function  - Cardiology follow up to address therapeutic anticoagulation, was on heparin drip   - Consider at least Hep SubQ for DVT PPx         Thank you for allowing me to participate in the care of Mr. Gr please do not hesitate to call or text me if you have further questions or concerns.     Brayan Mrat MD  [facility]   Division of Hematology/Oncology  [address], [address]  [place] [address]  P: [phone]  F: [phone]    Attestation:    ----Pt evaluated, >50min spent including face-to-face interaction in addition to chart review, reviewing treatment plan, discussing with care staff in hospital, his outside physician, and managing the patient’s chronic diagnoses as listed in the assessment----        Mr. Gr is a 69 year old male with PMHx of seizure disorder, sleep apnea, HTN, chronic idiopathic constipation, stage III CKD, severe obesity, secondary hyperparathyroidism, anemia, thrombocytopenia, hyperlipidemia, testicular cancer under treatment with Dr. Ovalles who is admitted for acute hypoxic respiratory failure secondary to COVID vs superimposed pneumonia with new onset thrombocytopenia. He was recently discharged 02/06/2025 after a tenous stay including intubation in the ICU for respiratory failure in setting of seizure. He had recovered and was discharged to rehab.      Former smoker, quit 14y prior, denied ETOH, drug use. Lives at home. Does not have children. Denies family history of blood disorders or malignancy.  Denies previous workplace related exposures.  Denies previous history of blood transfusions.  Denied history of thromboses.  Denied history of  requiring anticoagulation.     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  Margins were negative.  pT3Nx. CT C/A/P with few left para-aortic and left iliac chain lymph nodes largest measuring 8.1 cm left para-aortic node, bilateral subcentimeter noncalcified pulmonary nodules measuring up to 7 mm. AFP, LDH, hCG were all elevated. 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 Started on adjuvant therapy with Cisplatin+Etoposide, so far completed 4 cycles of treatment with cisplatin dose reduced 50% and Etoposide 50% due to thrombocytopenia.      02/19/2025: on HFNC, noted tachycardia and fever, Klebsiella in urine as well, thrombocytopenia stable/improving, no signs of active bleeding     02/20/2025: developed A.fib with RVR and was placed on heparin drip and pending cardiology eval    02/21/2025: awake, on AVAPS overnight, noted RRT for hypoxia as pt removed HFNC at some point in time, good response thereafter     02/22/2025:  continues on AVAPS this morning, noted RRT overnight for hypoxia, hypercapnia, ICU following, US LE negative for DVT, counts overall stable/improved     02/23/2025: progressive respiratory failure, noted ongoing RTT this morning with pending intubation and transfer to MICU     02/24/2025: intubated 02/23/2025, sedated, on pressor support, no signs of bleeding, counts noted, no signs of bleeding     02/25/2025: continues in MICU, intubated and sedated, no signs of bleeding    02/26/2025: continues in MICU, intubated, without signs of bleeding, continues on heparin drip     02/27/2025:  remains under the care of the ICU, intubated, no signs of bleeding and continues on heparin drip, counts stable, has not required PRBC support this admission    02/28/2025: continues under the care of MICU, continues intubated, no signs of bleeding, on heparin drip    03/01/2025: continues in MICU, intubated, direct josefina IgG positive, eluate negative, not likely to be clinically significant     03/02/2025:  continues in MICU, nursing staff at bedside, no overnight events noted. CTH without acute intracranial pathology     03/03/2025: continues in MICU, intubated, on heparin drip, 1u PRBC overnight, no signs of bleeding, platelet count stable     03/04/2025: awake, moving arms spontaneously on exam, continues without much interaction however. No signs of bleeding, continues heparin drip, continues intubated in MICU     03/05/2025: extubated 03/04/2025, continues in MICU, awake and alert this morning and able to speak full sentences, discussed/reviewed findings, continues on heparin drip     03/06/2025: nursing staff at bedside, bipap overnight, without signs of bleeding, counts noted stable, renal function improving     03/07/2025:  no signs of bleeding, counts noted, continues heparin drip, continues in MICU    03/08/2025: on HFNC, no signs of bleeding, although alert and answering questions, not back to his baseline yet, continues in MICU    03/09/2025: continues in MICU, s/p re-intubation 03/08/2025 given respiratory compromise in the setting of copious secretions as evidenced by bronchoscopy, in setting of fever, now sedated, mild crusting of blood around mouth, without active sign of bleeding, counts noted reviewed, continues on heparin drip     03/10/2025: continues in MICU, no signs of bleeding, discussed with nursing staff at bedside, receiving 1u PRBC due to H/H downtrend, sputum culture with Pseudomonas, ICU and ID recs noted, continues to be intubated     03/11/2025: continues in MICU, intubated and sedated, noted counts, without signs of bleeding, appropriate response to transfusion    03/12/2025: continues in MICU, intubated and sedated, without signs of bleeding     03/13/2025: continues in MICU, s/p Tracheostomy 03/12/2025 with tracheal intubation, continues with sedation     03/14/2025:  awake and alert, mental status much improved, transferred from MICU to [address] [place] [address] 03/13/2025. Without significant events overnight, discussed with nursing staff at bedside, stated pt did well overnight, no signs of bleeding, no fever noted, medications provided via NGT and he is pending PEG-Tube placement today. His case is complex, noted with repeat need for intubations, discussed with pt, he is aware. Discussed with pts primary Oncologist as well.    03/15/2025: awake and alert, no overnight events noted, discussed with nursing staff, he did well overnight, no fevers, no signs of bleeding endorsed. He nods to information. Counts reviewed overall stable, discussed with him as well. Had PEG placed 03/14/2025       #Acute hypoxic respiratory failure, Acute, Severe   # COVID  - CT noted with bilateral GGO  - ID following  - Pulmonary following  - Antibiotics per primary team/MICU and ID   - Intubated 02/23/2025 AM, MICU   - extubated 03/04/2025  - Pseudomonas from sputum culture   - Re-intubated 03/09/2025 due to increased work of breathing in setting of increased secretions, not protecting airway, tachypnea, hypoxia   - s/p Tracheostomy 03/12/2025      # Thrombocytopenia, Acute, Moderate   - Did occur during most recent admission and improved to normal prior to discharge   - Without signs of bleeding  - Could be multifactorial, possibly due to infection   - Continue to trend  - Transfuse to maintain Plt > 10k unless actively bleeding then maintain > 50k     # Brain lesion, Acute, Severe   - pt with hx of seizures  - MRI brain now with 5.4 mm enhancing lesion in the LEFT middle cerebellar peduncle with associated edema suspicious for metastases  - MRI Lumbar negative for acute disease  - Small lesion without mass effect or edema, recommended to correlate per neurology if foci and locus are concordant with seizure activity  - Neurology recs noted, new lesion not likely to cause seizure  - It is rare, but nonetheless not impossible, for testicular cancer to be metastatic to the brain  - He will need another PET-CT, can be completed outpatient once stable if concern can proceed with biopsy at that time   - Previous endocrinology eval for thyroid nodule noted  - AFP/BHCH normal,  previously   - Repeat CTH without acute intracranial pathology       # 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 Ovalles, agree with current plan  - Follow up as outpatient with Franki Ovalles MD     # Acute kidney injury on CKD Stage IIIA, Acute, Moderate   - Likely multifactorial  - Nephrology consult and recs noted  - Continue to trend     # Normocytic anemia, Acute, Severe   - Chronic, has hx of PRBC during chemo 07/2024  - Noted Anti E, Anti-K Anti-C antibodies previously  - direct josefina IgG positive, eluate negative, not likely to be clinically significant   - s/p 2u PRBC 03/03/2025 and 03/10/2025   - Monitor for bleeding  - Recommend to transfuse to maintain Hb > 7    # Klebsiella UTI, Acute, Severe > Moderate   # Pseudomonas UTI   -Antibiotics per ID and primary team       Recommendations  - Trend CBC daily, obtain post transfusion CBC today   - Transfuse to maintain Hb > 7   - Transfuse to maintain Plt >10k unless active bleeding then >50k   - Monitor renal function  - Cardiology follow up to address therapeutic anticoagulation, was on heparin drip   - Consider at least Hep SubQ for DVT PPx         Thank you for allowing me to participate in the care of Mr. Gr please do not hesitate to call or text me if you have further questions or concerns.     Brayan Mart MD  [facility]   Division of Hematology/Oncology  [address], [address]  [place] [address]  P: [phone]  F: [phone]    Attestation:    ----Pt evaluated, >50min spent including face-to-face interaction in addition to chart review, reviewing treatment plan, discussing with care staff in hospital, his outside physician, and managing the patient’s chronic diagnoses as listed in the assessment----

## 2025-03-15 NOTE — PROGRESS NOTE ADULT - ASSESSMENT
69 year old male with a past medical history of hypertension, hyperlipemia VAZQUEZ (on CPAP at bedtime, NC 2 liters during the day), CKD stage 3, epilepsy, schizophrenia, developmental delay, metastatic testicular cancer (s/p orchiectomy, on chemotherapy, last dose of etoposide/cisplatin on 6/2024) and with brain mets, presented with acute on chronic hypoxemic respiratory failure, secondary to COVID-19 infection with superimposed pneumonia after recent influenza infection s/p extubation and re-intubation now planned for trach    #PEG evaluation  #hypoxic respiratory failure, s/p multiple intubations  #new onset AF, off AC  #metastatic testicular cancer with brain metastases  #anemia  Patient admitted for hypoxic respiratory failure 2/2 COVID/bacterial PNA and fluid overload now with course c/b re-intubation and planned for bedside trach today. Course further c/b anemia with Hgb drop to 6.6 from baseline ~8 s/p 1U PRBC with improvement and stabilization to ~8 for the past 48h. No reported overt signs of GI bleeding. GI called for PEG evaluation. Status post PEG placement 3/14 with skin marking at 3.5 cm. No bleeding noted at site today.    Recommendations  -may administer enteral feeds via PEG today   -Nutrition consult for recommendations on enteral feeds  -keep bumper clean and dry, would avoid gauze between the PEG bumper and skin  -maintain aspiration precautions and elevate head of bed during feeds  -close observation to prevent PEG dislodgement, would recommend abdominal binder for protection/prevention of PEG dislodgment  -eventual Speech and Swallow evaluation as an outpatient to evaluate for swallowing; would not be a candidate for PEG removal until at least 6-8 weeks post placement   -GI to sign off.  Please call back with questions    Note and recommendations are incomplete until reviewed and attested by attending.    Ashley Mart MD  GI/Hepatology Fellow, PGY5  [place] Pager [phone] or [employer] Pager [phone]  Teams preferred (7AM to 5PM); after 5PM, call GI fellow on call    On Weekends/Holidays (All Day) and Weekdays after 5PM to 8AM  For non-urgent consults, please email [email or web address] and [email or web address]  For urgent consults, please contact on call GI team. See Amion schedule ([facility]), [facility] paging system ([facility]), or call hospital  ([facility])

## 2025-03-15 NOTE — PROGRESS NOTE ADULT - PROBLEM SELECTOR PLAN 1
Primarily caused by Covid, superimposed by bacterial pneumonia  - worsening secretions and consolidations on 3/8  - Now s/p trach 3/12    - BAL culture 3/8,  with Pseudomonas in sputum, resistant to Zosyn.  - Levaquin renally dosed 750 q48h (3/9-3/16) to complete 7d course.

## 2025-03-15 NOTE — PROGRESS NOTE ADULT - SUBJECTIVE AND OBJECTIVE BOX
Chief Complaint:  Patient is a 69y old  Male who presents with a chief complaint of Acute on chronic hypoxemic respiratory failure (15 Mar 2025 07:29)    Interval Events:  Status post PEG placement  No events overnight  Vital signs stable    Allergies:  latex (Rash)  seasonal allergies (Unknown)  No Known Drug Allergies        Hospital Medications:  acetaminophen     Tablet .. 650 milliGRAM(s) Oral every 6 hours PRN  albuterol/ipratropium for Nebulization 3 milliLiter(s) Nebulizer every 6 hours  aluminum hydroxide/magnesium hydroxide/simethicone Suspension 30 milliLiter(s) Oral every 4 hours PRN  artificial tears (preservative free) Ophthalmic Solution 1 Drop(s) Both EYES every 6 hours PRN  atorvastatin 20 milliGRAM(s) Oral at bedtime  chlorhexidine 0.12% Liquid 15 milliLiter(s) Oral Mucosa every 12 hours  cholecalciferol 1000 Unit(s) Oral daily  epoetin krystyna-epbx (RETACRIT) Injectable 57403 Unit(s) SubCutaneous <User Schedule>  escitalopram 15 milliGRAM(s) Oral with breakfast  gabapentin Solution 150 milliGRAM(s) Oral every 12 hours  influenza  Vaccine (HIGH DOSE) 0.5 milliLiter(s) IntraMuscular once  lacosamide IVPB 200 milliGRAM(s) IV Intermittent every 12 hours  lamoTRIgine 50 milliGRAM(s) Oral two times a day  lanolin Ointment 1 Application(s) Topical daily  levETIRAcetam  IVPB 750 milliGRAM(s) IV Intermittent every 12 hours  levoFLOXacin IVPB      levoFLOXacin IVPB 750 milliGRAM(s) IV Intermittent every 48 hours  lurasidone 20 milliGRAM(s) Oral at bedtime  melatonin 3 milliGRAM(s) Oral at bedtime PRN  nystatin Powder 1 Application(s) Topical every 8 hours  ondansetron Injectable 4 milliGRAM(s) IV Push every 8 hours PRN  pantoprazole  Injectable 40 milliGRAM(s) IV Push daily  polyethylene glycol 3350 17 Gram(s) Oral daily  senna Syrup 10 milliLiter(s) Oral at bedtime  sevelamer carbonate Powder 1600 milliGRAM(s) Oral every 8 hours      PMHX/PSHX:  Hypertension, unspecified type    Other hyperlipidemia    History of epilepsy    Paranoid schizophrenia    Obstructive sleep apnea    Testicular cancer    H/O Spinal surgery        Family history:      PHYSICAL EXAM:   Vital Signs:  Vital Signs Last 24 Hrs  T(C): 36.9 (15 Mar 2025 05:00), Max: 37.2 (14 Mar 2025 10:21)  T(F): 98.5 (15 Mar 2025 05:00), Max: 98.9 (14 Mar 2025 10:21)  HR: 78 (15 Mar 2025 05:55) (78 - 100)  BP: 150/79 (15 Mar 2025 05:00) (115/61 - 178/85)  BP(mean): 82 (14 Mar 2025 11:04) (82 - 82)  RR: 18 (15 Mar 2025 05:00) (16 - 20)  SpO2: 100% (15 Mar 2025 05:55) (95% - 100%)    Parameters below as of 15 Mar 2025 05:00  Patient On (Oxygen Delivery Method): ventilator      Daily Height in cm: 167.64 (14 Mar 2025 11:04)    Daily     GENERAL:  No acute distress  HEENT:  trach  CHEST:  no accessory muscle use  HEART:  Regular rate and rhythm  ABDOMEN:  Soft, non-tender, non-distended; PEG with skin marking at 3.5cm  EXTREMITIES:  No edema  SKIN:  No rash/ecchymoses  NEURO:  opens eyes; answering yes/no questions appropriately    LABS:                        8.1    11.78 )-----------( 148      ( 14 Mar 2025 07:13 )             26.0       03-14    141  |  106  |  50[H]  ----------------------------<  93  3.8   |  20[L]  |  2.67[H]    Ca    9.8      14 Mar 2025 07:14  Phos  3.2     03-14  Mg     2.0     03-14    TPro  6.8  /  Alb  2.9[L]  /  TBili  0.5  /  DBili  x   /  AST  50[H]  /  ALT  37  /  AlkPhos  251[H]  03-14    LIVER FUNCTIONS - ( 14 Mar 2025 07:14 )  Alb: 2.9 g/dL / Pro: 6.8 g/dL / ALK PHOS: 251 U/L / ALT: 37 U/L / AST: 50 U/L / GGT: x             Urinalysis Basic - ( 14 Mar 2025 07:14 )    Color: x / Appearance: x / SG: x / pH: x  Gluc: 93 mg/dL / Ketone: x  / Bili: x / Urobili: x   Blood: x / Protein: x / Nitrite: x   Leuk Esterase: x / RBC: x / WBC x   Sq Epi: x / Non Sq Epi: x / Bacteria: x                              8.1    11.78 )-----------( 148      ( 14 Mar 2025 07:13 )             26.0                         7.9    7.78  )-----------( 130      ( 13 Mar 2025 00:16 )             24.8     < from: Upper Endoscopy (03.14.25 @ 10:38) >  Findings:   The examined esophagus was normal.       The entire examined stomach was normal. The patient was placed in the supine position for PEG        placement. The stomach was insufflated to appose gastric and abdominal walls. A site was        located in the body of the stomach with excellent transillumination and manual external        pressure for placement. The abdominal wall was marked and prepped in a sterile manner. The        area was anesthetized with 5 mL of 0.5% lidocaine. The trocar needle was introduced through        the abdominal wall and into the stomach under direct endoscopic view. A snare was introduced        through the endoscope and opened in the gastric lumen. The guide wire was passed through the        trocar and into the open snare. The snare was closed around the guide wire. The endoscope and        snare were removed, pulling the wire out through the mouth. A skin incision was made at the        site of needle insertion. The externally removable 20 Fr Bard gastrostomy tube was        lubricated. The G-tube was tied to the guide wire and pulled through the mouth and into the        stomach. The trocar needle was removed, and the gastrostomy tube was pulled out from the        stomach through the skin. The externalbumper was attached to the gastrostomy tube, and the        tube was cut to remove the guide wire. The final position of the gastrostomy tube was        confirmed by relook endoscopy, and skin marking noted to be 3 cm at the external bumper. The     final tension and compression of the abdominal wall by the PEG tube and external bumper were        checked and revealed that the bumper was moderately tight and mildly deforming the skin. The        feeding tube was capped, and the tube site cleaned and dressed.       The duodenal bulb and second portion of the duodenum were normal.                                                                                                        Impression:          - Normal esophagus.    - Normal stomach.                       - Normal duodenal bulb and second portion of the duodenum.                       - An externally removable PEG placement was successfully completed.                       - No specimens collected.    < end of copied text >

## 2025-03-15 NOTE — PROGRESS NOTE ADULT - SUBJECTIVE AND OBJECTIVE BOX
[facility]   [phone]  RENAL FOLLOW UP NOTE  --------------------------------------------------------------------------------  Chief Complaint:    24 hour events/subjective:    seen earlier trach vent  responsive    PAST HISTORY  --------------------------------------------------------------------------------  No significant changes to PMH, PSH, FHx, SHx, unless otherwise noted    ALLERGIES & MEDICATIONS  --------------------------------------------------------------------------------  Allergies    seasonal allergies (Unknown)  No Known Drug Allergies    Intolerances    latex (Rash)    Standing Inpatient Medications  albuterol/ipratropium for Nebulization 3 milliLiter(s) Nebulizer every 6 hours  atorvastatin 20 milliGRAM(s) Oral at bedtime  chlorhexidine 0.12% Liquid 15 milliLiter(s) Oral Mucosa every 12 hours  cholecalciferol 1000 Unit(s) Oral daily  enoxaparin Injectable 40 milliGRAM(s) SubCutaneous every 24 hours  epoetin krystyna-epbx (RETACRIT) Injectable 56724 Unit(s) SubCutaneous <User Schedule>  escitalopram 15 milliGRAM(s) Oral with breakfast  gabapentin Solution 150 milliGRAM(s) Oral every 12 hours  influenza  Vaccine (HIGH DOSE) 0.5 milliLiter(s) IntraMuscular once  lacosamide IVPB 200 milliGRAM(s) IV Intermittent every 12 hours  lamoTRIgine 50 milliGRAM(s) Oral two times a day  lanolin Ointment 1 Application(s) Topical daily  levETIRAcetam  IVPB 750 milliGRAM(s) IV Intermittent every 12 hours  lurasidone 20 milliGRAM(s) Oral at bedtime  nystatin Powder 1 Application(s) Topical every 8 hours  pantoprazole  Injectable 40 milliGRAM(s) IV Push daily  polyethylene glycol 3350 17 Gram(s) Oral daily  senna Syrup 10 milliLiter(s) Oral at bedtime  sevelamer carbonate Powder 1600 milliGRAM(s) Oral every 8 hours    PRN Inpatient Medications  acetaminophen     Tablet .. 650 milliGRAM(s) Oral every 6 hours PRN  aluminum hydroxide/magnesium hydroxide/simethicone Suspension 30 milliLiter(s) Oral every 4 hours PRN  artificial tears (preservative free) Ophthalmic Solution 1 Drop(s) Both EYES every 6 hours PRN  melatonin 3 milliGRAM(s) Oral at bedtime PRN  ondansetron Injectable 4 milliGRAM(s) IV Push every 8 hours PRN      REVIEW OF SYSTEMS  --------------------------------------------------------------------------------      VITALS/PHYSICAL EXAM  --------------------------------------------------------------------------------  T(C): 36.3 (03-15-25 @ 17:02), Max: 36.9 (03-15-25 @ 05:00)  HR: 93 (03-15-25 @ 21:16) (43 - 97)  BP: 145/83 (03-15-25 @ 17:02) (141/77 - 150/79)  RR: 18 (03-15-25 @ 17:02) (18 - 18)  SpO2: 96% (03-15-25 @ 21:16) (95% - 100%)  Wt(kg): --  Height (cm): 167.6 (03-14-25 @ 11:04)  Weight (kg): 89.1 (03-14-25 @ 11:04)  BMI (kg/m2): 31.7 (03-14-25 @ 11:04)  BSA (m2): 1.98 (03-14-25 @ 11:04)      03-14-25 @ 07:01  -  03-15-25 @ 07:00  --------------------------------------------------------  IN: 270 mL / OUT: 400 mL / NET: -130 mL    03-15-25 @ 07:01  -  03-15-25 @ 21:55  --------------------------------------------------------  IN: 540 mL / OUT: 350 mL / NET: 190 mL      Physical Exam:  	  Gen: ill appearing male,  remains  on vent trach   	Pulm: decrease bs, +coarse bs    	CV: No JVD. RRR, S1S2; no rub  	Abd: +BS, soft, nondistended  	UE: Warm, no cyanosis  no clubbing,  no edema;  	LE: Warm, no cyanosis  no clubbing, no edema      LABS/STUDIES  --------------------------------------------------------------------------------              8.1    7.74  >-----------<  151      [03-15-25 @ 09:38]              25.5     141  |  106  |  38  ----------------------------<  85      [03-15-25 @ 09:38]  3.5   |  20  |  2.28        Ca     9.9     [03-15-25 @ 09:38]      Mg     1.9     [03-15-25 @ 09:38]      Phos  3.1     [03-15-25 @ 09:38]    TPro  6.8  /  Alb  2.9  /  TBili  0.5  /  DBili  x   /  AST  50  /  ALT  37  /  AlkPhos  251  [03-14-25 @ 07:14]          Creatinine Trend:  SCr 2.28 [03-15 @ 09:38]  SCr 2.67 [03-14 @ 07:14]  SCr 3.06 [03-13 @ 00:16]  SCr 2.81 [03-12 @ 00:26]  SCr 3.00 [03-11 @ 00:36]              Ferritin 5943      [02-23-25 @ 06:15]  TSH 0.87      [01-25-25 @ 11:31]

## 2025-03-15 NOTE — PROGRESS NOTE ADULT - NUTRITIONAL ASSESSMENT
This patient has been assessed with a concern for Malnutrition and has been determined to have a diagnosis/diagnoses of Severe protein-calorie malnutrition.    This patient is being managed with:   Diet NPO-  Except Medications  Entered: Mar 14 2025  1:59PM

## 2025-03-15 NOTE — PROGRESS NOTE ADULT - ASSESSMENT
69 year old male with a past medical history of hypertension, hyperlipemia VAZQUEZ (on CPAP at bedtime, NC 2 liters during the day), CKD stage 3, epilepsy, schizophrenia, developmental delay, metastatic testicular cancer (s/p orchiectomy, actively on chemotherapy, last dose of etoposide/cisplatin on 6/2024), presenting with acute on chronic hypoxemic respiratory failure, secondary to (a) COVID-19 infection, (b) superimposed pneumonia after recent influenza infection,  Transferred to MICU on 2/23 after RRT on floors due to hypoxia even with max setting AVAPS. Patient started on midodrine and weaned off of levo on 2/24. Failed pressure support on 2/25 and started diuresis with Bumex. CTH was unremarkable and weaned off of Precedex on 3/1. On 3/4 patient extubated and [facility] conversations with family revealed that they would want patient to be trached. On 3/7, patient found to have RLL consolidation on POCUS and started on Zosyn, intubated on 3/8 due to increased lethargy and inability to clear oral secretions. Trach placed on 3/12 AM and patient made NPO for PEG. Transferred to RCU on 3/13.    3/14: Kept Npo overnight for Peg placement today. Tolerated procedure well, Endoscopy with normal findings. Cleared for meds only via peg today and Feeds tomorrow when GI see's and clear. BG stable, making urine, net negative ~600ml, keep net negative balance. CR continues to downtrend (2.67), peaked to 4.34 on 3/3. Pseudomonas PNA on + Bal / recurrent PSA UTI on Levaquin q 48hrs thru 3/16. ID following. Sister given full medical update by attending physician today.  69 year old male with a past medical history of hypertension, hyperlipemia VAZQUEZ (on CPAP at bedtime, NC 2 liters during the day), CKD stage 3, epilepsy, schizophrenia, developmental delay, metastatic testicular cancer (s/p orchiectomy, actively on chemotherapy, last dose of etoposide/cisplatin on 6/2024), presenting with acute on chronic hypoxemic respiratory failure, secondary to (a) COVID-19 infection, (b) superimposed pneumonia after recent influenza infection,  Transferred to MICU on 2/23 after RRT on floors due to hypoxia even with max setting AVAPS. Patient started on midodrine and weaned off of levo on 2/24. Failed pressure support on 2/25 and started diuresis with Bumex. CTH was unremarkable and weaned off of Precedex on 3/1. On 3/4 patient extubated and [facility] conversations with family revealed that they would want patient to be trached. On 3/7, patient found to have RLL consolidation on POCUS and started on Zosyn, intubated on 3/8 due to increased lethargy and inability to clear oral secretions. Trach placed on 3/12. Transferred to RCU on 3/13. S/p Peg 3/14.  EGD with normal findings.     3/15. S/p Peg 3/14, EGD with normal finding, Cleared by GI to start Feeds today. BG stable, making urine, net negative ~130ml, keep net negative balance. CR continues to downtrend (2.28), peaked to 4.34 on 3/3. Pseudomonas PNA on + Bal Cx/ recurrent PSA UTI on Levaquin q 48hrs thru 3/16. ID following. Prophylactic AC started, trend H/H and platelets. Repeat echo ordered to evaluate for L atrial thrombus.   69 year old male with a past medical history of hypertension, hyperlipemia VAZQUEZ (on CPAP at bedtime, NC 2 liters during the day), CKD stage 3, epilepsy, schizophrenia, developmental delay, metastatic testicular cancer (s/p orchiectomy, actively on chemotherapy, last dose of etoposide/cisplatin on 6/2024), presenting with acute on chronic hypoxemic respiratory failure, secondary to (a) COVID-19 infection, (b) superimposed pneumonia after recent influenza infection,  Transferred to MICU on 2/23 after RRT on floors due to hypoxia even with max setting AVAPS. Patient started on midodrine and weaned off of levo on 2/24. Failed pressure support on 2/25 and started diuresis with Bumex. CTH was unremarkable and weaned off of Precedex on 3/1. On 3/4 patient extubated and [facility] conversations with family revealed that they would want patient to be trached. On 3/7, patient found to have RLL consolidation on POCUS and started on Zosyn, intubated on 3/8 due to increased lethargy and inability to clear oral secretions. Trach placed on 3/12. Transferred to RCU on 3/13. S/p Peg 3/14.  EGD with normal findings.     3/15. S/p Peg 3/14, EGD with normal finding, Cleared by GI to start Feeds today. BG stable, making urine, net negative ~130ml, keep net negative balance. CR continues to downtrend (2.28), peaked to 4.34 on 3/3. Pseudomonas PNA on + Bal Cx/ recurrent PSA UTI on Levaquin q 48hrs thru 3/16. ID following. Prophylactic AC started, trend H/H and platelets. Repeat echo ordered to evaluate for possible L atrial thrombus.

## 2025-03-15 NOTE — PROGRESS NOTE ADULT - PROBLEM SELECTOR PLAN 8
- Kept NPO overnight for peg placement   - S/p Peg placement 3/14. EGD with normal findings. Patient cleared of meds only via peg and GI will reassess tomorrow on when tube feeds can be resumed.  - Continue Senna and Miralax - Kept NPO overnight for peg placement   - S/p Peg placement 3/14. EGD with normal findings. Patient cleared of meds only via peg.   - GI cleared for tube feeds to be resumed 3/15, monitor for tolerance   - Continue Senna and Miralax

## 2025-03-15 NOTE — PROGRESS NOTE ADULT - SUBJECTIVE AND OBJECTIVE BOX
DATE OF SERVICE: 03-15-25 @ 18:12    Patient is a 69y old  Male who presents with a chief complaint of Acute on chronic hypoxemic respiratory failure (15 Mar 2025 09:27)      INTERVAL HISTORY: In no acute distress, family at bedside    REVIEW OF SYSTEMS:  CONSTITUTIONAL: No weakness  EYES/ENT: No visual changes;  No throat pain   NECK: No pain or stiffness  RESPIRATORY: No cough, wheezing; No shortness of breath  CARDIOVASCULAR: No chest pain or palpitations  GASTROINTESTINAL: No abdominal  pain. No nausea, vomiting, or hematemesis  GENITOURINARY: No dysuria, frequency or hematuria  NEUROLOGICAL: No stroke like symptoms  SKIN: No rashes    TELEMETRY Personally reviewed: SR 60s  	  MEDICATIONS:        PHYSICAL EXAM:  T(C): 36.3 (03-15-25 @ 17:02), Max: 36.9 (03-15-25 @ 05:00)  HR: 84 (03-15-25 @ 17:31) (43 - 97)  BP: 145/83 (03-15-25 @ 17:02) (141/77 - 150/79)  RR: 18 (03-15-25 @ 17:02) (18 - 18)  SpO2: 100% (03-15-25 @ 17:31) (95% - 100%)  Wt(kg): --  I&O's Summary    14 Mar 2025 07:01  -  15 Mar 2025 07:00  --------------------------------------------------------  IN: 270 mL / OUT: 400 mL / NET: -130 mL    15 Mar 2025 07:01  -  15 Mar 2025 18:12  --------------------------------------------------------  IN: 220 mL / OUT: 350 mL / NET: -130 mL          Appearance: In no distress	  HEENT:    PERRL, EOMI	  Cardiovascular:  S1 S2, No JVD  Respiratory: Lungs clear to auscultation	  Gastrointestinal:  Soft, Non-tender, + BS	  Vascularature:  No edema of LE  Psychiatric: Appropriate affect   Neuro: no acute focal deficits                               8.1    7.74  )-----------( 151      ( 15 Mar 2025 09:38 )             25.5     03-15    141  |  106  |  38[H]  ----------------------------<  85  3.5   |  20[L]  |  2.28[H]    Ca    9.9      15 Mar 2025 09:38  Phos  3.1     03-15  Mg     1.9     03-15    TPro  6.8  /  Alb  2.9[L]  /  TBili  0.5  /  DBili  x   /  AST  50[H]  /  ALT  37  /  AlkPhos  251[H]  03-14        Labs personally reviewed      ASSESSMENT/PLAN: 	    69 year old male with a past medical history of hypertension, hyperlipemia VAZQUEZ (on CPAP at bedtime, NC 2 liters during the day), CKD stage 3, epilepsy, schizophrenia, developmental delay, metastatic testicular cancer (s/p orchiectomy, actively on chemotherapy, last dose of etoposide/cisplatin on 6/2024), presenting with acute on chronic hypoxemic respiratory failure, secondary to (a) COVID-19 infection, (b) superimposed pneumonia after recent influenza infection, and/or (c) fluid overload.     Problem/Plan - 1:  ·  Problem: Acute on chronic respiratory failure with hypoxia.   ·  Plan: Likely 2/2 PNA, HF  - Appreciate pulmonology.  - Transferred to MICU for hypoxia. Appreciate MICU care.   - 2/24 started on Bumex gtt at 2mg/hr  - 2/26 No appreciable JVD or peripheral edema. BP now soft with labile but stable Cr. Does not appear to be a HF. Now off diuretics.   --- 2/26 CT Chest shows new and worsening confluent areas of consolidation/pneumonia in the mid to lower lungs.  - CXR 3/10 with unchanged patchy opacities throughout the left lung    Problem/Plan - 2:  ·  Problem: SHAGUFTA (acute kidney injury).   ·  Plan: Has a history of CKD stage 3, Cr 2.38 at baseline. Presents with Cr 3.27.   - Nephrology following  - BUN now improved following de-escalation of diuretics    Problem/Plan - 3:  ·  Problem: Chronic heart failure with preserved ejection fraction.   ·  Plan: TTE done here 1/17/25 technically difficult, but LV systolic fxn appears nl without any regional wall motion abnormalities  - Repeat TTE pending  - BNP 5000 <---1100 but appears euvolemic   - s/p bumex gtt per ICU    Problem/Plan - 4:  ·  Problem: New onset Afib RVR (on tele, confirmed with EKG 2/19)   ·  Plan:  - Unable to tolerate rate control 2/2 hypotension  - AC on hold given thrombocytopenia and anemia    Problem/Plan - 5:  ·  Problem: HTN    ·  Plan: Still on pressors as per MICU    Problem/Plan - 6:  ·  Problem: Cardiac Risk Stratification   ·  Plan: Patient is high risk given pressor dependent for mod risk PEG placement. No contraindication to proceed.   - Tele with stable HR SR with PACs  - Patient with preserved EF, not currently in decompensated HF  - No significant valvular dysfunction        Allyson Oscar NP-C  Gus Andrew DO [facility]  Cardiovascular Medicine  [address], [address]  Available through call or text on [employer] TE[employer]  Office: [phone]   DATE OF SERVICE: 03-15-25 @ 18:12    Patient is a 69y old  Male who presents with a chief complaint of Acute on chronic hypoxemic respiratory failure (15 Mar 2025 09:27)      INTERVAL HISTORY: In no acute distress, family at bedside    REVIEW OF SYSTEMS:  CONSTITUTIONAL: No weakness  EYES/ENT: No visual changes;  No throat pain   NECK: No pain or stiffness  RESPIRATORY: No cough, wheezing; No shortness of breath  CARDIOVASCULAR: No chest pain or palpitations  GASTROINTESTINAL: No abdominal  pain. No nausea, vomiting, or hematemesis  GENITOURINARY: No dysuria, frequency or hematuria  NEUROLOGICAL: No stroke like symptoms  SKIN: No rashes    TELEMETRY Personally reviewed: SR 60s  	  MEDICATIONS:        PHYSICAL EXAM:  T(C): 36.3 (03-15-25 @ 17:02), Max: 36.9 (03-15-25 @ 05:00)  HR: 84 (03-15-25 @ 17:31) (43 - 97)  BP: 145/83 (03-15-25 @ 17:02) (141/77 - 150/79)  RR: 18 (03-15-25 @ 17:02) (18 - 18)  SpO2: 100% (03-15-25 @ 17:31) (95% - 100%)  Wt(kg): --  I&O's Summary    14 Mar 2025 07:01  -  15 Mar 2025 07:00  --------------------------------------------------------  IN: 270 mL / OUT: 400 mL / NET: -130 mL    15 Mar 2025 07:01  -  15 Mar 2025 18:12  --------------------------------------------------------  IN: 220 mL / OUT: 350 mL / NET: -130 mL          Appearance: In no distress	  HEENT:    PERRL, EOMI	  Cardiovascular:  S1 S2, No JVD  Respiratory: Lungs clear to auscultation	  Gastrointestinal:  Soft, Non-tender, + BS	  Vascularature:  No edema of LE  Psychiatric: Appropriate affect   Neuro: no acute focal deficits                               8.1    7.74  )-----------( 151      ( 15 Mar 2025 09:38 )             25.5     03-15    141  |  106  |  38[H]  ----------------------------<  85  3.5   |  20[L]  |  2.28[H]    Ca    9.9      15 Mar 2025 09:38  Phos  3.1     03-15  Mg     1.9     03-15    TPro  6.8  /  Alb  2.9[L]  /  TBili  0.5  /  DBili  x   /  AST  50[H]  /  ALT  37  /  AlkPhos  251[H]  03-14        Labs personally reviewed      ASSESSMENT/PLAN: 	     69 year old male with a past medical history of hypertension, hyperlipemia VAZQUEZ (on CPAP at bedtime, NC 2 liters during the day), CKD stage 3, epilepsy, schizophrenia, developmental delay, metastatic testicular cancer (s/p orchiectomy, actively on chemotherapy, last dose of etoposide/cisplatin on 6/2024), presenting with acute on chronic hypoxemic respiratory failure, secondary to (a) COVID-19 infection, (b) superimposed pneumonia after recent influenza infection, and/or (c) fluid overload.     Problem/Plan - 1:  ·  Problem: Acute on chronic respiratory failure with hypoxia.   ·  Plan: Likely 2/2 PNA, HF  - Appreciate pulmonology.  - Transferred to RCU    Problem/Plan - 2:  ·  Problem: SHAGUFTA (acute kidney injury).   ·  Plan: Has a history of CKD stage 3, Cr 2.38 at baseline.    - Nephrology following  - BUN now improved following de-escalation of diuretics    Problem/Plan - 3:  ·  Problem: Chronic heart failure with preserved ejection fraction.   ·  Plan: TTE done here 1/17/25 technically difficult, but LV systolic fxn appears nl without any regional wall motion abnormalities  - Euvolemic, repeat BNP. On 2/23 2300    Problem/Plan - 4:  ·  Problem: New onset Afib RVR (on tele, confirmed with EKG 2/19)   ·  Plan:  - Rec Metoprolol 25mg BID  - Rec Eliquis 5mg BID      Problem/Plan - 5:  ·  Problem: HTN    ·  Plan: Resolved    Problem/Plan - 6:  ·  Problem: Cardiac Risk Stratification   ·  Plan: Patient is high risk given pressor dependent for mod risk PEG placement. No contraindication to proceed.   - Tele with stable HR SR with PACs  - Patient with preserved EF, not currently in decompensated HF  - No significant valvular dysfunction          SAAD Lagos DO [facility]  Cardiovascular Medicine  [address] [place] [address], [address]  Available through call or text on [employer] TEAM[employer]  Office: [phone]

## 2025-03-15 NOTE — PROGRESS NOTE ADULT - ATTENDING SUPERVISION STATEMENT
Fellow
Resident

## 2025-03-15 NOTE — PROGRESS NOTE ADULT - SUBJECTIVE AND OBJECTIVE BOX
[facility] HEMATOLOGY/ONCOLOGY INPATIENT PROGRESS NOTE     Interval Hx:   03-15-25: Mr. Gr was seen at bedside today.    Meds:   MEDICATIONS  (STANDING):  albuterol/ipratropium for Nebulization 3 milliLiter(s) Nebulizer every 6 hours  atorvastatin 20 milliGRAM(s) Oral at bedtime  chlorhexidine 0.12% Liquid 15 milliLiter(s) Oral Mucosa every 12 hours  cholecalciferol 1000 Unit(s) Oral daily  epoetin krystyna-epbx (RETACRIT) Injectable 32359 Unit(s) SubCutaneous <User Schedule>  escitalopram 15 milliGRAM(s) Oral with breakfast  gabapentin Solution 150 milliGRAM(s) Oral every 12 hours  influenza  Vaccine (HIGH DOSE) 0.5 milliLiter(s) IntraMuscular once  lacosamide IVPB 200 milliGRAM(s) IV Intermittent every 12 hours  lamoTRIgine 50 milliGRAM(s) Oral two times a day  lanolin Ointment 1 Application(s) Topical daily  levETIRAcetam  IVPB 750 milliGRAM(s) IV Intermittent every 12 hours  levoFLOXacin IVPB      levoFLOXacin IVPB 750 milliGRAM(s) IV Intermittent every 48 hours  lurasidone 20 milliGRAM(s) Oral at bedtime  nystatin Powder 1 Application(s) Topical every 8 hours  pantoprazole  Injectable 40 milliGRAM(s) IV Push daily  polyethylene glycol 3350 17 Gram(s) Oral daily  senna Syrup 10 milliLiter(s) Oral at bedtime  sevelamer carbonate Powder 1600 milliGRAM(s) Oral every 8 hours    MEDICATIONS  (PRN):  acetaminophen     Tablet .. 650 milliGRAM(s) Oral every 6 hours PRN Temp greater or equal to 38C (100.4F), Mild Pain (1 - 3)  aluminum hydroxide/magnesium hydroxide/simethicone Suspension 30 milliLiter(s) Oral every 4 hours PRN Dyspepsia  artificial tears (preservative free) Ophthalmic Solution 1 Drop(s) Both EYES every 6 hours PRN Dry Eyes  melatonin 3 milliGRAM(s) Oral at bedtime PRN Insomnia  ondansetron Injectable 4 milliGRAM(s) IV Push every 8 hours PRN Nausea and/or Vomiting    Vital Signs Last 24 Hrs  T(C): 36.8 (15 Mar 2025 00:00), Max: 37.2 (14 Mar 2025 05:39)  T(F): 98.2 (15 Mar 2025 00:00), Max: 99 (14 Mar 2025 05:39)  HR: 83 (15 Mar 2025 03:21) (79 - 100)  BP: 141/77 (15 Mar 2025 00:00) (115/61 - 178/85)  BP(mean): 82 (14 Mar 2025 11:04) (82 - 82)  RR: 18 (15 Mar 2025 00:00) (16 - 20)  SpO2: 95% (15 Mar 2025 03:21) (95% - 100%)    Parameters below as of 15 Mar 2025 00:00  Patient On (Oxygen Delivery Method): ventilator    Physical Exam:  Gen: NAD, tracheostomy with ETT  HEENT: EOMI, MMM  Chest: equal chest rise  Cardiac: regular   Abd: non distended    Labs:                        8.1    11.78 )-----------( 148      ( 14 Mar 2025 07:13 )             26.0     CBC Full  -  ( 14 Mar 2025 07:13 )  WBC Count : 11.78 K/uL  RBC Count : 2.67 M/uL  Hemoglobin : 8.1 g/dL  Hematocrit : 26.0 %  Platelet Count - Automated : 148 K/uL  Mean Cell Volume : 97.4 fl  Mean Cell Hemoglobin : 30.3 pg  Mean Cell Hemoglobin Concentration : 31.2 g/dL    03-14    141  |  106  |  50[H]  ----------------------------<  93  3.8   |  20[L]  |  2.67[H]    Ca    9.8      14 Mar 2025 07:14  Phos  3.2     03-14  Mg     2.0     03-14    TPro  6.8  /  Alb  2.9[L]  /  TBili  0.5  /  DBili  x   /  AST  50[H]  /  ALT  37  /  AlkPhos  251[H]  03-14      Bilirubin Total: 0.5 mg/dL (03-14-25 @ 07:14)   [facility] HEMATOLOGY/ONCOLOGY INPATIENT PROGRESS NOTE     Interval Hx:   03-15-25: Mr. Gr was seen at bedside today, awake and alert, no overnight events noted, discussed with nursing staff, he did well overnight, no fevers, no signs of bleeding endorsed. He nods to information. Counts reviewed overall stable, discussed with him as well. Had PEG placed 03/14/2025     Meds:   MEDICATIONS  (STANDING):  albuterol/ipratropium for Nebulization 3 milliLiter(s) Nebulizer every 6 hours  atorvastatin 20 milliGRAM(s) Oral at bedtime  chlorhexidine 0.12% Liquid 15 milliLiter(s) Oral Mucosa every 12 hours  cholecalciferol 1000 Unit(s) Oral daily  epoetin krystyna-epbx (RETACRIT) Injectable 75410 Unit(s) SubCutaneous <User Schedule>  escitalopram 15 milliGRAM(s) Oral with breakfast  gabapentin Solution 150 milliGRAM(s) Oral every 12 hours  influenza  Vaccine (HIGH DOSE) 0.5 milliLiter(s) IntraMuscular once  lacosamide IVPB 200 milliGRAM(s) IV Intermittent every 12 hours  lamoTRIgine 50 milliGRAM(s) Oral two times a day  lanolin Ointment 1 Application(s) Topical daily  levETIRAcetam  IVPB 750 milliGRAM(s) IV Intermittent every 12 hours  levoFLOXacin IVPB      levoFLOXacin IVPB 750 milliGRAM(s) IV Intermittent every 48 hours  lurasidone 20 milliGRAM(s) Oral at bedtime  nystatin Powder 1 Application(s) Topical every 8 hours  pantoprazole  Injectable 40 milliGRAM(s) IV Push daily  polyethylene glycol 3350 17 Gram(s) Oral daily  senna Syrup 10 milliLiter(s) Oral at bedtime  sevelamer carbonate Powder 1600 milliGRAM(s) Oral every 8 hours    MEDICATIONS  (PRN):  acetaminophen     Tablet .. 650 milliGRAM(s) Oral every 6 hours PRN Temp greater or equal to 38C (100.4F), Mild Pain (1 - 3)  aluminum hydroxide/magnesium hydroxide/simethicone Suspension 30 milliLiter(s) Oral every 4 hours PRN Dyspepsia  artificial tears (preservative free) Ophthalmic Solution 1 Drop(s) Both EYES every 6 hours PRN Dry Eyes  melatonin 3 milliGRAM(s) Oral at bedtime PRN Insomnia  ondansetron Injectable 4 milliGRAM(s) IV Push every 8 hours PRN Nausea and/or Vomiting    Vital Signs Last 24 Hrs  T(C): 36.8 (15 Mar 2025 00:00), Max: 37.2 (14 Mar 2025 05:39)  T(F): 98.2 (15 Mar 2025 00:00), Max: 99 (14 Mar 2025 05:39)  HR: 83 (15 Mar 2025 03:21) (79 - 100)  BP: 141/77 (15 Mar 2025 00:00) (115/61 - 178/85)  BP(mean): 82 (14 Mar 2025 11:04) (82 - 82)  RR: 18 (15 Mar 2025 00:00) (16 - 20)  SpO2: 95% (15 Mar 2025 03:21) (95% - 100%)    Parameters below as of 15 Mar 2025 00:00  Patient On (Oxygen Delivery Method): ventilator    Physical Exam:  Gen: NAD, tracheostomy with ETT  HEENT: EOMI, MMM  Chest: equal chest rise  Cardiac: regular   Abd: non distended    Labs:                        8.1    11.78 )-----------( 148      ( 14 Mar 2025 07:13 )             26.0     CBC Full  -  ( 14 Mar 2025 07:13 )  WBC Count : 11.78 K/uL  RBC Count : 2.67 M/uL  Hemoglobin : 8.1 g/dL  Hematocrit : 26.0 %  Platelet Count - Automated : 148 K/uL  Mean Cell Volume : 97.4 fl  Mean Cell Hemoglobin : 30.3 pg  Mean Cell Hemoglobin Concentration : 31.2 g/dL    03-14    141  |  106  |  50[H]  ----------------------------<  93  3.8   |  20[L]  |  2.67[H]    Ca    9.8      14 Mar 2025 07:14  Phos  3.2     03-14  Mg     2.0     03-14    TPro  6.8  /  Alb  2.9[L]  /  TBili  0.5  /  DBili  x   /  AST  50[H]  /  ALT  37  /  AlkPhos  251[H]  03-14      Bilirubin Total: 0.5 mg/dL (03-14-25 @ 07:14)

## 2025-03-15 NOTE — PROGRESS NOTE ADULT - PROBLEM SELECTOR PLAN 4
Continue with home meds:  -Lacosamide, Lurasidone, Lamotrigine  - Multiple EEG's negative no seizure, recently done 3/4

## 2025-03-15 NOTE — PROGRESS NOTE ADULT - ASSESSMENT
69 year old male with a past medical history of hypertension, hyperlipemia VAZQUEZ (on CPAP at bedtime, NC 2 liters during the day), CKD stage 3, epilepsy, schizophrenia, developmental delay, metastatic testicular cancer (s/p orchiectomy, actively on chemotherapy, last dose of etoposide/cisplatin on 6/2024) presenting with worsening shortness of breath. Patient was recently hospitalized at [facility] from January 27th 2025 to February 6th 2025 for seizure and influenza virus infection, which required intubation. He was sent to rehab (originally from home) from hospital. He returns due to sudden onset shortness of breath and worsening oxygenation. Of note, he tested positive for COVID-19       1- SHAGUFTA on CKDIII  2- covid-19  3- anemia  4- hypotension  5- respiratory failure         SHAGUFTA suspected in setting of new infection. covid 19   cr and bun  at a plateau range   bp is in range   trend cr and lytes   hyperphosphatemia   renvela 1600 mg tid  anemia, trend hb ---> retacrit [id number] U tiw sq  vent weaning trial   dTezw RCU  team when seen earlier

## 2025-03-15 NOTE — PROGRESS NOTE ADULT - ATTENDING COMMENTS
Agree with above. Ok to use PEG today for feeding. Keep binder on to avoid dislodgement. Rate/type of enteral feeds as per nutrition consult.

## 2025-03-15 NOTE — PROGRESS NOTE ADULT - SUBJECTIVE AND OBJECTIVE BOX
Patient is a 69y old  Male who presents with a chief complaint of Acute on chronic hypoxemic respiratory failure (15 Mar 2025 04:40)      Interval Events:    REVIEW OF SYSTEMS:  [ ] Positive  [ ] All other systems negative  [ ] Unable to assess ROS because ________    Vital Signs Last 24 Hrs  T(C): 36.9 (03-15-25 @ 05:00), Max: 37.2 (03-14-25 @ 07:36)  T(F): 98.5 (03-15-25 @ 05:00), Max: 98.9 (03-14-25 @ 07:36)  HR: 78 (03-15-25 @ 05:55) (78 - 100)  BP: 150/79 (03-15-25 @ 05:00) (115/61 - 178/85)  RR: 18 (03-15-25 @ 05:00) (16 - 20)  SpO2: 100% (03-15-25 @ 05:55) (95% - 100%)PHYSICAL EXAM:  HEENT:   [ ]Tracheostomy:  [ ]Pupils equal  [ ]No oral lesions  [ ]Abnormal        SKIN  [ ]No Rash  [ ] Abnormal  [ ] pressure    CARDIAC  [ ]Regular  [ ]Abnormal    PULMONARY  [ ]Bilateral Clear Breath Sounds  [ ]Normal Excursion  [ ]Abnormal    GI  [ ]PEG      [ ] +BS		              [ ]Soft, nondistended, nontender	  [ ]Abnormal    MUSCULOSKELETAL                                   [ ]Bedbound                 [ ]Abnormal    [ ]Ambulatory/OOB to chair                           EXTREMITIES                                         [ ]Normal  [ ]Edema                           NEUROLOGIC  [ ] Normal, non focal  [ ] Focal findings:    PSYCHIATRIC  [ ]Alert and appropriate  [ ] Sedated	 [ ]Agitated    :  [place]: [ ] Yes, if yes: Date of Placement:                   [  ] No    LINES: Central Lines [ ] Yes, if yes: Date of Placement                                     [  ] No    HOSPITAL MEDICATIONS:  MEDICATIONS  (STANDING):  albuterol/ipratropium for Nebulization 3 milliLiter(s) Nebulizer every 6 hours  atorvastatin 20 milliGRAM(s) Oral at bedtime  chlorhexidine 0.12% Liquid 15 milliLiter(s) Oral Mucosa every 12 hours  cholecalciferol 1000 Unit(s) Oral daily  epoetin krystyna-epbx (RETACRIT) Injectable 36539 Unit(s) SubCutaneous <User Schedule>  escitalopram 15 milliGRAM(s) Oral with breakfast  gabapentin Solution 150 milliGRAM(s) Oral every 12 hours  influenza  Vaccine (HIGH DOSE) 0.5 milliLiter(s) IntraMuscular once  lacosamide IVPB 200 milliGRAM(s) IV Intermittent every 12 hours  lamoTRIgine 50 milliGRAM(s) Oral two times a day  lanolin Ointment 1 Application(s) Topical daily  levETIRAcetam  IVPB 750 milliGRAM(s) IV Intermittent every 12 hours  levoFLOXacin IVPB      levoFLOXacin IVPB 750 milliGRAM(s) IV Intermittent every 48 hours  lurasidone 20 milliGRAM(s) Oral at bedtime  nystatin Powder 1 Application(s) Topical every 8 hours  pantoprazole  Injectable 40 milliGRAM(s) IV Push daily  polyethylene glycol 3350 17 Gram(s) Oral daily  senna Syrup 10 milliLiter(s) Oral at bedtime  sevelamer carbonate Powder 1600 milliGRAM(s) Oral every 8 hours    MEDICATIONS  (PRN):  acetaminophen     Tablet .. 650 milliGRAM(s) Oral every 6 hours PRN Temp greater or equal to 38C (100.4F), Mild Pain (1 - 3)  aluminum hydroxide/magnesium hydroxide/simethicone Suspension 30 milliLiter(s) Oral every 4 hours PRN Dyspepsia  artificial tears (preservative free) Ophthalmic Solution 1 Drop(s) Both EYES every 6 hours PRN Dry Eyes  melatonin 3 milliGRAM(s) Oral at bedtime PRN Insomnia  ondansetron Injectable 4 milliGRAM(s) IV Push every 8 hours PRN Nausea and/or Vomiting      LABS:                        8.1    11.78 )-----------( 148      ( 14 Mar 2025 07:13 )             26.0     03-14    141  |  106  |  50[H]  ----------------------------<  93  3.8   |  20[L]  |  2.67[H]    Ca    9.8      14 Mar 2025 07:14  Phos  3.2     03-14  Mg     2.0     03-14    TPro  6.8  /  Alb  2.9[L]  /  TBili  0.5  /  DBili  x   /  AST  50[H]  /  ALT  37  /  AlkPhos  251[H]  03-14      Urinalysis Basic - ( 14 Mar 2025 07:14 )    Color: x / Appearance: x / SG: x / pH: x  Gluc: 93 mg/dL / Ketone: x  / Bili: x / Urobili: x   Blood: x / Protein: x / Nitrite: x   Leuk Esterase: x / RBC: x / WBC x   Sq Epi: x / Non Sq Epi: x / Bacteria: x          CAPILLARY BLOOD GLUCOSE    MICROBIOLOGY:     RADIOLOGY:  [ ] Reviewed and interpreted by me    Mode: CPAP with PS  FiO2: 30  PEEP: 6  PS: 10  MAP: 10  PIP: 17   Patient is a 69y old  Male who presents with a chief complaint of Acute on chronic hypoxemic respiratory failure (15 Mar 2025 04:40)      Interval Events: S/p peg yesterday, Feeds off overnight, no fevers or events                           Did well on PSV trials                           Last Day of Levaquin                           Following CR     REVIEW OF SYSTEMS:  [ ] Positive  [ x] All other systems negative  [ ] Unable to assess ROS because ________    Vital Signs Last 24 Hrs  T(C): 36.9 (03-15-25 @ 05:00), Max: 37.2 (03-14-25 @ 07:36)  T(F): 98.5 (03-15-25 @ 05:00), Max: 98.9 (03-14-25 @ 07:36)  HR: 78 (03-15-25 @ 05:55) (78 - 100)  BP: 150/79 (03-15-25 @ 05:00) (115/61 - 178/85)  RR: 18 (03-15-25 @ 05:00) (16 - 20)  SpO2: 100% (03-15-25 @ 05:55) (95% - 100%)    PHYSICAL EXAM:    HEENT:   [x ]Tracheostomy: # 7 cuffed portex   [x ]Pupils equal  [ ]No oral lesions  [ ]Abnormal    SKIN  [ ]No Rash  [x ] Abnormal: macerated skin noted on perineum, buttocks, and inner thighs; Lt occipital scalp injury   [ ] pressure    CARDIAC  [x ]Regular  [ ]Abnormal    PULMONARY  [x ]Bilateral Clear Breath Sounds  [ ]Normal Excursion  [ ]Abnormal    GI  [x ] peg site covered w/ bandage, binder in place   [ ] +BS		              [x ]Soft, nondistended, nontender	  [ ]Abnormal    MUSCULOSKELETAL                                   [x ]Bedbound                 [ ]Abnormal    [ ]Ambulatory/OOB to chair                           EXTREMITIES                                         [ ]Normal  [ ]Edema                           NEUROLOGIC  [ ] Normal, non focal  [ ] Focal findings:    PSYCHIATRIC  [x ]Alert and appropriate  [ ] Sedated	 [ ]Agitated    :  [place]: [ ] Yes, if yes: Date of Placement:                   [x  ] No    LINES: Central Lines [ ] Yes, if yes: Date of Placement                                     [ x ] No    HOSPITAL MEDICATIONS:  MEDICATIONS  (STANDING):  albuterol/ipratropium for Nebulization 3 milliLiter(s) Nebulizer every 6 hours  atorvastatin 20 milliGRAM(s) Oral at bedtime  chlorhexidine 0.12% Liquid 15 milliLiter(s) Oral Mucosa every 12 hours  cholecalciferol 1000 Unit(s) Oral daily  epoetin krystyna-epbx (RETACRIT) Injectable 19329 Unit(s) SubCutaneous <User Schedule>  escitalopram 15 milliGRAM(s) Oral with breakfast  gabapentin Solution 150 milliGRAM(s) Oral every 12 hours  influenza  Vaccine (HIGH DOSE) 0.5 milliLiter(s) IntraMuscular once  lacosamide IVPB 200 milliGRAM(s) IV Intermittent every 12 hours  lamoTRIgine 50 milliGRAM(s) Oral two times a day  lanolin Ointment 1 Application(s) Topical daily  levETIRAcetam  IVPB 750 milliGRAM(s) IV Intermittent every 12 hours  levoFLOXacin IVPB      levoFLOXacin IVPB 750 milliGRAM(s) IV Intermittent every 48 hours  lurasidone 20 milliGRAM(s) Oral at bedtime  nystatin Powder 1 Application(s) Topical every 8 hours  pantoprazole  Injectable 40 milliGRAM(s) IV Push daily  polyethylene glycol 3350 17 Gram(s) Oral daily  senna Syrup 10 milliLiter(s) Oral at bedtime  sevelamer carbonate Powder 1600 milliGRAM(s) Oral every 8 hours    MEDICATIONS  (PRN):  acetaminophen     Tablet .. 650 milliGRAM(s) Oral every 6 hours PRN Temp greater or equal to 38C (100.4F), Mild Pain (1 - 3)  aluminum hydroxide/magnesium hydroxide/simethicone Suspension 30 milliLiter(s) Oral every 4 hours PRN Dyspepsia  artificial tears (preservative free) Ophthalmic Solution 1 Drop(s) Both EYES every 6 hours PRN Dry Eyes  melatonin 3 milliGRAM(s) Oral at bedtime PRN Insomnia  ondansetron Injectable 4 milliGRAM(s) IV Push every 8 hours PRN Nausea and/or Vomiting      LABS:                        8.1    11.78 )-----------( 148      ( 14 Mar 2025 07:13 )             26.0     03-14    141  |  106  |  50[H]  ----------------------------<  93  3.8   |  20[L]  |  2.67[H]    Ca    9.8      14 Mar 2025 07:14  Phos  3.2     03-14  Mg     2.0     03-14    TPro  6.8  /  Alb  2.9[L]  /  TBili  0.5  /  DBili  x   /  AST  50[H]  /  ALT  37  /  AlkPhos  251[H]  03-14      Urinalysis Basic - ( 14 Mar 2025 07:14 )    Color: x / Appearance: x / SG: x / pH: x  Gluc: 93 mg/dL / Ketone: x  / Bili: x / Urobili: x   Blood: x / Protein: x / Nitrite: x   Leuk Esterase: x / RBC: x / WBC x   Sq Epi: x / Non Sq Epi: x / Bacteria: x          CAPILLARY BLOOD GLUCOSE    MICROBIOLOGY:     RADIOLOGY:  [ ] Reviewed and interpreted by me    Mode: CPAP with PS  FiO2: 30  PEEP: 6  PS: 10  MAP: 10  PIP: 17

## 2025-03-15 NOTE — PROGRESS NOTE ADULT - PROBLEM SELECTOR PLAN 9
SHAGUFTA on CKD stage 3 suspected in setting of new infection Covid 19   - CR peaked to 4.34 on 3/3  - hyperphosphatemia, continue Renvela 1600 mg tid  - trend hb add Retacrit [id number] U tiw sq  for trach SHAGUFTA on CKD stage 3 suspected in setting of new infection Covid 19   - CR peaked to 4.34 on 3/3, now improving   - hyperphosphatemia, continue Renvela 1600 mg tid  - trend hb add Retacrit [id number] U tiw sq  for trach

## 2025-03-15 NOTE — PROGRESS NOTE ADULT - PROBLEM SELECTOR PLAN 6
Chronic heart failure with preserved ejection fraction.   ·  Plan: TTE done here 1/17/25 technically difficult, but LV systolic fxn appears nl without any regional wall motion abnormalities  - Repeat TTE pending  - BNP 5000 <---1100 but appears euvolemic   - s/p bumex gtt per ICU Chronic heart failure with preserved ejection fraction.   ·  Plan: TTE done here 1/17/25 technically difficult, but LV systolic fxn appears nl without any regional wall motion abnormalities  - Repeat TTE ordered 3/15  - BNP 5000 <---1100 but appears euvolemic   - s/p bumex gtt per ICU

## 2025-03-15 NOTE — PROGRESS NOTE ADULT - NS ATTEND AMEND GEN_ALL_CORE FT
70 y/o M w/hx as above including metastatic testicular cancer, epilepsy on AEDs, chronic respiratory failure with hypoxia and hypercapnia on home O2, HFpEF admitted for acute on chronic respiratory failure with hypoxia and hypercapnia likely secondary to combination of COVID PNA, bacterial superinfection, and acute pulmonary edema due to acute on chronic HFpEF. Hypotension likely severe sepsis with septic shock. SHAGUFTA likely ATN +/- venous congestion. Repeat CT chest with bilateral GGOs possibly acute pulmonary edema vs early fibrosis vs residual COVID along with L basilar consolidation likely bacterial PNA vs atelectasis. Now s/p trach awaiting PEG.    # Acute on chronic respiratory failure with hypoxia and hypercapnia  # COVID PNA  # Bacterial PNA  # Acute pulmonary edema  # Acute on chronic HFpEF  # Hypotension  # Severe sepsis with septic shock  # SHAGUFTA  # Hypernatremia  # Epilepsy    - Continue AEDs  - Wean from vent as tolerated  - Trend Cr, avoid nephrotoxins  - Abx as per ID  - Completed course of dexamethasone for COVID, completed remdesivir  - Hypernatremia resolved  - Therapeutic AC on hold for Afib due to prior hgb drops, but H/H now stable, will start Lovenox ppx and monitor hgb  - Full code

## 2025-03-15 NOTE — PROGRESS NOTE ADULT - PROBLEM SELECTOR PLAN 5
New onset Afib with RVR, EKG 2/19  - Heparin drip stopped to H/H instability  - patient is at increased risk of bleed.   - Will perform OPHELIA to clear LYNN. If clear, will hold AC moving forward   - restart BB if rates remain uncontrolled New onset Afib with RVR, EKG 2/19  - Heparin drip stopped to H/H instability  - patient is at increased risk of bleed.   - Will perform Repeat TTE to clear LYNN. If clear, will hold AC moving forward   - restart BB if rates remain uncontrolled

## 2025-03-16 LAB
ANION GAP SERPL CALC-SCNC: 12 MMOL/L — SIGNIFICANT CHANGE UP (ref 5–17)
BUN SERPL-MCNC: 32 MG/DL — HIGH (ref 7–23)
CALCIUM SERPL-MCNC: 9.9 MG/DL — SIGNIFICANT CHANGE UP (ref 8.4–10.5)
CHLORIDE SERPL-SCNC: 105 MMOL/L — SIGNIFICANT CHANGE UP (ref 96–108)
CO2 SERPL-SCNC: 22 MMOL/L — SIGNIFICANT CHANGE UP (ref 22–31)
CREAT SERPL-MCNC: 2.01 MG/DL — HIGH (ref 0.5–1.3)
EGFR: 36 ML/MIN/1.73M2 — LOW
EGFR: 36 ML/MIN/1.73M2 — LOW
GLUCOSE BLDC GLUCOMTR-MCNC: 115 MG/DL — HIGH (ref 70–99)
GLUCOSE BLDC GLUCOMTR-MCNC: 122 MG/DL — HIGH (ref 70–99)
GLUCOSE BLDC GLUCOMTR-MCNC: 127 MG/DL — HIGH (ref 70–99)
GLUCOSE BLDC GLUCOMTR-MCNC: 127 MG/DL — HIGH (ref 70–99)
GLUCOSE SERPL-MCNC: 108 MG/DL — HIGH (ref 70–99)
HCT VFR BLD CALC: 25.4 % — LOW (ref 39–50)
HGB BLD-MCNC: 8.1 G/DL — LOW (ref 13–17)
MAGNESIUM SERPL-MCNC: 1.8 MG/DL — SIGNIFICANT CHANGE UP (ref 1.6–2.6)
MCHC RBC-ENTMCNC: 30.8 PG — SIGNIFICANT CHANGE UP (ref 27–34)
MCHC RBC-ENTMCNC: 31.9 G/DL — LOW (ref 32–36)
MCV RBC AUTO: 96.6 FL — SIGNIFICANT CHANGE UP (ref 80–100)
NRBC BLD AUTO-RTO: 0 /100 WBCS — SIGNIFICANT CHANGE UP (ref 0–0)
PHOSPHATE SERPL-MCNC: 2.9 MG/DL — SIGNIFICANT CHANGE UP (ref 2.5–4.5)
PLATELET # BLD AUTO: 162 K/UL — SIGNIFICANT CHANGE UP (ref 150–400)
POTASSIUM SERPL-MCNC: 3.6 MMOL/L — SIGNIFICANT CHANGE UP (ref 3.5–5.3)
POTASSIUM SERPL-SCNC: 3.6 MMOL/L — SIGNIFICANT CHANGE UP (ref 3.5–5.3)
RBC # BLD: 2.63 M/UL — LOW (ref 4.2–5.8)
RBC # FLD: 16.2 % — HIGH (ref 10.3–14.5)
SODIUM SERPL-SCNC: 139 MMOL/L — SIGNIFICANT CHANGE UP (ref 135–145)
WBC # BLD: 7.61 K/UL — SIGNIFICANT CHANGE UP (ref 3.8–10.5)
WBC # FLD AUTO: 7.61 K/UL — SIGNIFICANT CHANGE UP (ref 3.8–10.5)

## 2025-03-16 RX ORDER — ESCITALOPRAM OXALATE 20 MG/1
15 TABLET ORAL
Refills: 0 | Status: DISCONTINUED | OUTPATIENT
Start: 2025-03-16 | End: 2025-03-28

## 2025-03-16 RX ORDER — MAGNESIUM, ALUMINUM HYDROXIDE 200-200 MG
30 TABLET,CHEWABLE ORAL EVERY 4 HOURS
Refills: 0 | Status: DISCONTINUED | OUTPATIENT
Start: 2025-03-16 | End: 2025-03-28

## 2025-03-16 RX ORDER — SEVELAMER HYDROCHLORIDE 800 MG/1
1600 TABLET ORAL EVERY 8 HOURS
Refills: 0 | Status: DISCONTINUED | OUTPATIENT
Start: 2025-03-16 | End: 2025-03-18

## 2025-03-16 RX ORDER — MAGNESIUM SULFATE 500 MG/ML
2 SYRINGE (ML) INJECTION ONCE
Refills: 0 | Status: COMPLETED | OUTPATIENT
Start: 2025-03-16 | End: 2025-03-16

## 2025-03-16 RX ORDER — ATORVASTATIN CALCIUM 80 MG/1
20 TABLET, FILM COATED ORAL AT BEDTIME
Refills: 0 | Status: DISCONTINUED | OUTPATIENT
Start: 2025-03-16 | End: 2025-03-28

## 2025-03-16 RX ORDER — ACETAMINOPHEN 500 MG/5ML
650 LIQUID (ML) ORAL EVERY 6 HOURS
Refills: 0 | Status: DISCONTINUED | OUTPATIENT
Start: 2025-03-16 | End: 2025-03-28

## 2025-03-16 RX ORDER — POLYETHYLENE GLYCOL 3350 17 G/17G
17 POWDER, FOR SOLUTION ORAL DAILY
Refills: 0 | Status: DISCONTINUED | OUTPATIENT
Start: 2025-03-16 | End: 2025-03-24

## 2025-03-16 RX ORDER — LAMOTRIGINE 150 MG/1
50 TABLET ORAL
Refills: 0 | Status: DISCONTINUED | OUTPATIENT
Start: 2025-03-16 | End: 2025-03-28

## 2025-03-16 RX ORDER — SEVELAMER HYDROCHLORIDE 800 MG/1
1600 TABLET ORAL EVERY 8 HOURS
Refills: 0 | Status: DISCONTINUED | OUTPATIENT
Start: 2025-03-16 | End: 2025-03-16

## 2025-03-16 RX ORDER — LURASIDONE HYDROCHLORIDE 120 MG/1
20 TABLET, FILM COATED ORAL AT BEDTIME
Refills: 0 | Status: DISCONTINUED | OUTPATIENT
Start: 2025-03-16 | End: 2025-03-28

## 2025-03-16 RX ORDER — GABAPENTIN 400 MG/1
150 CAPSULE ORAL EVERY 12 HOURS
Refills: 0 | Status: DISCONTINUED | OUTPATIENT
Start: 2025-03-16 | End: 2025-03-28

## 2025-03-16 RX ADMIN — NYSTATIN 1 APPLICATION(S): 100000 CREAM TOPICAL at 22:40

## 2025-03-16 RX ADMIN — Medication 650 MILLIGRAM(S): at 00:10

## 2025-03-16 RX ADMIN — NYSTATIN 1 APPLICATION(S): 100000 CREAM TOPICAL at 05:17

## 2025-03-16 RX ADMIN — SEVELAMER HYDROCHLORIDE 1600 MILLIGRAM(S): 800 TABLET ORAL at 05:16

## 2025-03-16 RX ADMIN — LURASIDONE HYDROCHLORIDE 20 MILLIGRAM(S): 120 TABLET, FILM COATED ORAL at 22:39

## 2025-03-16 RX ADMIN — LEVETIRACETAM 400 MILLIGRAM(S): 10 INJECTION, SOLUTION INTRAVENOUS at 18:11

## 2025-03-16 RX ADMIN — Medication 25 GRAM(S): at 16:12

## 2025-03-16 RX ADMIN — Medication 650 MILLIGRAM(S): at 17:12

## 2025-03-16 RX ADMIN — Medication 40 MILLIGRAM(S): at 13:02

## 2025-03-16 RX ADMIN — Medication 1000 UNIT(S): at 13:01

## 2025-03-16 RX ADMIN — IPRATROPIUM BROMIDE AND ALBUTEROL SULFATE 3 MILLILITER(S): .5; 2.5 SOLUTION RESPIRATORY (INHALATION) at 23:57

## 2025-03-16 RX ADMIN — GABAPENTIN 150 MILLIGRAM(S): 400 CAPSULE ORAL at 17:30

## 2025-03-16 RX ADMIN — SEVELAMER HYDROCHLORIDE 1600 MILLIGRAM(S): 800 TABLET ORAL at 22:39

## 2025-03-16 RX ADMIN — GABAPENTIN 150 MILLIGRAM(S): 400 CAPSULE ORAL at 05:16

## 2025-03-16 RX ADMIN — Medication 1 APPLICATION(S): at 14:29

## 2025-03-16 RX ADMIN — IPRATROPIUM BROMIDE AND ALBUTEROL SULFATE 3 MILLILITER(S): .5; 2.5 SOLUTION RESPIRATORY (INHALATION) at 06:03

## 2025-03-16 RX ADMIN — ESCITALOPRAM OXALATE 15 MILLIGRAM(S): 20 TABLET ORAL at 09:18

## 2025-03-16 RX ADMIN — LACOSAMIDE 140 MILLIGRAM(S): 150 TABLET, FILM COATED ORAL at 17:30

## 2025-03-16 RX ADMIN — NYSTATIN 1 APPLICATION(S): 100000 CREAM TOPICAL at 13:02

## 2025-03-16 RX ADMIN — LACOSAMIDE 140 MILLIGRAM(S): 150 TABLET, FILM COATED ORAL at 05:21

## 2025-03-16 RX ADMIN — ATORVASTATIN CALCIUM 20 MILLIGRAM(S): 80 TABLET, FILM COATED ORAL at 22:39

## 2025-03-16 RX ADMIN — IPRATROPIUM BROMIDE AND ALBUTEROL SULFATE 3 MILLILITER(S): .5; 2.5 SOLUTION RESPIRATORY (INHALATION) at 17:29

## 2025-03-16 RX ADMIN — Medication 650 MILLIGRAM(S): at 16:12

## 2025-03-16 RX ADMIN — ENOXAPARIN SODIUM 40 MILLIGRAM(S): 100 INJECTION SUBCUTANEOUS at 17:30

## 2025-03-16 RX ADMIN — LEVETIRACETAM 400 MILLIGRAM(S): 10 INJECTION, SOLUTION INTRAVENOUS at 05:15

## 2025-03-16 RX ADMIN — Medication 15 MILLILITER(S): at 05:15

## 2025-03-16 RX ADMIN — SEVELAMER HYDROCHLORIDE 1600 MILLIGRAM(S): 800 TABLET ORAL at 13:02

## 2025-03-16 RX ADMIN — IPRATROPIUM BROMIDE AND ALBUTEROL SULFATE 3 MILLILITER(S): .5; 2.5 SOLUTION RESPIRATORY (INHALATION) at 11:07

## 2025-03-16 RX ADMIN — Medication 15 MILLILITER(S): at 17:30

## 2025-03-16 RX ADMIN — LAMOTRIGINE 50 MILLIGRAM(S): 150 TABLET ORAL at 17:30

## 2025-03-16 RX ADMIN — LAMOTRIGINE 50 MILLIGRAM(S): 150 TABLET ORAL at 05:16

## 2025-03-16 NOTE — PROGRESS NOTE ADULT - SUBJECTIVE AND OBJECTIVE BOX
[facility] HEMATOLOGY/ONCOLOGY INPATIENT PROGRESS NOTE     Interval Hx:   03-16-25: Mr. Gr was seen at bedside today.    Meds:   MEDICATIONS  (STANDING):  albuterol/ipratropium for Nebulization 3 milliLiter(s) Nebulizer every 6 hours  atorvastatin 20 milliGRAM(s) Oral at bedtime  chlorhexidine 0.12% Liquid 15 milliLiter(s) Oral Mucosa every 12 hours  cholecalciferol 1000 Unit(s) Oral daily  enoxaparin Injectable 40 milliGRAM(s) SubCutaneous every 24 hours  epoetin krystyna-epbx (RETACRIT) Injectable 27959 Unit(s) SubCutaneous <User Schedule>  escitalopram 15 milliGRAM(s) Oral with breakfast  gabapentin Solution 150 milliGRAM(s) Oral every 12 hours  influenza  Vaccine (HIGH DOSE) 0.5 milliLiter(s) IntraMuscular once  lacosamide IVPB 200 milliGRAM(s) IV Intermittent every 12 hours  lamoTRIgine 50 milliGRAM(s) Oral two times a day  lanolin Ointment 1 Application(s) Topical daily  levETIRAcetam  IVPB 750 milliGRAM(s) IV Intermittent every 12 hours  lurasidone 20 milliGRAM(s) Oral at bedtime  nystatin Powder 1 Application(s) Topical every 8 hours  pantoprazole  Injectable 40 milliGRAM(s) IV Push daily  polyethylene glycol 3350 17 Gram(s) Oral daily  senna Syrup 10 milliLiter(s) Oral at bedtime  sevelamer carbonate Powder 1600 milliGRAM(s) Oral every 8 hours    MEDICATIONS  (PRN):  acetaminophen     Tablet .. 650 milliGRAM(s) Oral every 6 hours PRN Temp greater or equal to 38C (100.4F), Mild Pain (1 - 3)  aluminum hydroxide/magnesium hydroxide/simethicone Suspension 30 milliLiter(s) Oral every 4 hours PRN Dyspepsia  artificial tears (preservative free) Ophthalmic Solution 1 Drop(s) Both EYES every 6 hours PRN Dry Eyes  melatonin 3 milliGRAM(s) Oral at bedtime PRN Insomnia  ondansetron Injectable 4 milliGRAM(s) IV Push every 8 hours PRN Nausea and/or Vomiting    Vital Signs Last 24 Hrs  T(C): 36.4 (16 Mar 2025 00:00), Max: 36.9 (15 Mar 2025 11:51)  T(F): 97.6 (16 Mar 2025 00:00), Max: 98.4 (15 Mar 2025 11:51)  HR: 99 (16 Mar 2025 03:27) (43 - 99)  BP: 162/87 (16 Mar 2025 00:00) (145/83 - 162/87)  BP(mean): --  RR: 18 (16 Mar 2025 00:00) (18 - 18)  SpO2: 97% (16 Mar 2025 03:27) (96% - 100%)    Parameters below as of 16 Mar 2025 00:00  Patient On (Oxygen Delivery Method): tracheostomy collar    Physical Exam:  Gen: NAD, tracheostomy with ETT  HEENT: EOMI, MMM  Chest: equal chest rise  Cardiac: regular   Abd: non distended    Labs:                        8.1    7.74  )-----------( 151      ( 15 Mar 2025 09:38 )             25.5     CBC Full  -  ( 15 Mar 2025 09:38 )  WBC Count : 7.74 K/uL  RBC Count : 2.63 M/uL  Hemoglobin : 8.1 g/dL  Hematocrit : 25.5 %  Platelet Count - Automated : 151 K/uL  Mean Cell Volume : 97.0 fl  Mean Cell Hemoglobin : 30.8 pg  Mean Cell Hemoglobin Concentration : 31.8 g/dL    03-15    141  |  106  |  38[H]  ----------------------------<  85  3.5   |  20[L]  |  2.28[H]    Ca    9.9      15 Mar 2025 09:38  Phos  3.1     03-15  Mg     1.9     03-15    TPro  6.8  /  Alb  2.9[L]  /  TBili  0.5  /  DBili  x   /  AST  50[H]  /  ALT  37  /  AlkPhos  251[H]  03-14       [facility] HEMATOLOGY/ON[facility]OGY INPATIENT PROGRESS NOTE     Interval Hx:   03-16-25: Mr. Gr was seen at bedside today, no overnight events noted, comfortable overnight, nursing staff at bedside, stated pt did well, no signs of bleeding, had a BM. No fever reported. Counts reviewed, stable at this time     Meds:   MEDICATIONS  (STANDING):  albuterol/ipratropium for Nebulization 3 milliLiter(s) Nebulizer every 6 hours  atorvastatin 20 milliGRAM(s) Oral at bedtime  chlorhexidine 0.12% Liquid 15 milliLiter(s) Oral Mucosa every 12 hours  cholecalciferol 1000 Unit(s) Oral daily  enoxaparin Injectable 40 milliGRAM(s) SubCutaneous every 24 hours  epoetin krystyna-epbx (RETACRIT) Injectable 00998 Unit(s) SubCutaneous <User Schedule>  escitalopram 15 milliGRAM(s) Oral with breakfast  gabapentin Solution 150 milliGRAM(s) Oral every 12 hours  influenza  Vaccine (HIGH DOSE) 0.5 milliLiter(s) IntraMuscular once  lacosamide IVPB 200 milliGRAM(s) IV Intermittent every 12 hours  lamoTRIgine 50 milliGRAM(s) Oral two times a day  lanolin Ointment 1 Application(s) Topical daily  levETIRAcetam  IVPB 750 milliGRAM(s) IV Intermittent every 12 hours  lurasidone 20 milliGRAM(s) Oral at bedtime  nystatin Powder 1 Application(s) Topical every 8 hours  pantoprazole  Injectable 40 milliGRAM(s) IV Push daily  polyethylene glycol 3350 17 Gram(s) Oral daily  senna Syrup 10 milliLiter(s) Oral at bedtime  sevelamer carbonate Powder 1600 milliGRAM(s) Oral every 8 hours    MEDICATIONS  (PRN):  acetaminophen     Tablet .. 650 milliGRAM(s) Oral every 6 hours PRN Temp greater or equal to 38C (100.4F), Mild Pain (1 - 3)  aluminum hydroxide/magnesium hydroxide/simethicone Suspension 30 milliLiter(s) Oral every 4 hours PRN Dyspepsia  artificial tears (preservative free) Ophthalmic Solution 1 Drop(s) Both EYES every 6 hours PRN Dry Eyes  melatonin 3 milliGRAM(s) Oral at bedtime PRN Insomnia  ondansetron Injectable 4 milliGRAM(s) IV Push every 8 hours PRN Nausea and/or Vomiting    Vital Signs Last 24 Hrs  T(C): 36.4 (16 Mar 2025 00:00), Max: 36.9 (15 Mar 2025 11:51)  T(F): 97.6 (16 Mar 2025 00:00), Max: 98.4 (15 Mar 2025 11:51)  HR: 99 (16 Mar 2025 03:27) (43 - 99)  BP: 162/87 (16 Mar 2025 00:00) (145/83 - 162/87)  BP(mean): --  RR: 18 (16 Mar 2025 00:00) (18 - 18)  SpO2: 97% (16 Mar 2025 03:27) (96% - 100%)    Parameters below as of 16 Mar 2025 00:00  Patient On (Oxygen Delivery Method): tracheostomy collar    Physical Exam:  Gen: NAD, tracheostomy with ETT  HEENT: EOMI, MMM  Chest: equal chest rise  Cardiac: regular   Abd: non distended    Labs:                        8.1    7.74  )-----------( 151      ( 15 Mar 2025 09:38 )             25.5     CBC Full  -  ( 15 Mar 2025 09:38 )  WBC Count : 7.74 K/uL  RBC Count : 2.63 M/uL  Hemoglobin : 8.1 g/dL  Hematocrit : 25.5 %  Platelet Count - Automated : 151 K/uL  Mean Cell Volume : 97.0 fl  Mean Cell Hemoglobin : 30.8 pg  Mean Cell Hemoglobin Concentration : 31.8 g/dL    03-15    141  |  106  |  38[H]  ----------------------------<  85  3.5   |  20[L]  |  2.28[H]    Ca    9.9      15 Mar 2025 09:38  Phos  3.1     03-15  Mg     1.9     03-15    TPro  6.8  /  Alb  2.9[L]  /  TBili  0.5  /  DBili  x   /  AST  50[H]  /  ALT  37  /  AlkPhos  251[H]  03-14       [facility] HEMATOLOGY/ONCOLOGY INPATIENT PROGRESS NOTE     Interval Hx:   03-16-25: Mr. Gr was seen at bedside today, no overnight events noted, comfortable overnight, nursing staff at bedside, stated pt did well, no signs of bleeding, had a BM. No fever reported. Counts reviewed, stable at this time, he is on lovenox PPx     Meds:   MEDICATIONS  (STANDING):  albuterol/ipratropium for Nebulization 3 milliLiter(s) Nebulizer every 6 hours  atorvastatin 20 milliGRAM(s) Oral at bedtime  chlorhexidine 0.12% Liquid 15 milliLiter(s) Oral Mucosa every 12 hours  cholecalciferol 1000 Unit(s) Oral daily  enoxaparin Injectable 40 milliGRAM(s) SubCutaneous every 24 hours  epoetin krystyna-epbx (RETACRIT) Injectable 30944 Unit(s) SubCutaneous <User Schedule>  escitalopram 15 milliGRAM(s) Oral with breakfast  gabapentin Solution 150 milliGRAM(s) Oral every 12 hours  influenza  Vaccine (HIGH DOSE) 0.5 milliLiter(s) IntraMuscular once  lacosamide IVPB 200 milliGRAM(s) IV Intermittent every 12 hours  lamoTRIgine 50 milliGRAM(s) Oral two times a day  lanolin Ointment 1 Application(s) Topical daily  levETIRAcetam  IVPB 750 milliGRAM(s) IV Intermittent every 12 hours  lurasidone 20 milliGRAM(s) Oral at bedtime  nystatin Powder 1 Application(s) Topical every 8 hours  pantoprazole  Injectable 40 milliGRAM(s) IV Push daily  polyethylene glycol 3350 17 Gram(s) Oral daily  senna Syrup 10 milliLiter(s) Oral at bedtime  sevelamer carbonate Powder 1600 milliGRAM(s) Oral every 8 hours    MEDICATIONS  (PRN):  acetaminophen     Tablet .. 650 milliGRAM(s) Oral every 6 hours PRN Temp greater or equal to 38C (100.4F), Mild Pain (1 - 3)  aluminum hydroxide/magnesium hydroxide/simethicone Suspension 30 milliLiter(s) Oral every 4 hours PRN Dyspepsia  artificial tears (preservative free) Ophthalmic Solution 1 Drop(s) Both EYES every 6 hours PRN Dry Eyes  melatonin 3 milliGRAM(s) Oral at bedtime PRN Insomnia  ondansetron Injectable 4 milliGRAM(s) IV Push every 8 hours PRN Nausea and/or Vomiting    Vital Signs Last 24 Hrs  T(C): 36.4 (16 Mar 2025 00:00), Max: 36.9 (15 Mar 2025 11:51)  T(F): 97.6 (16 Mar 2025 00:00), Max: 98.4 (15 Mar 2025 11:51)  HR: 99 (16 Mar 2025 03:27) (43 - 99)  BP: 162/87 (16 Mar 2025 00:00) (145/83 - 162/87)  BP(mean): --  RR: 18 (16 Mar 2025 00:00) (18 - 18)  SpO2: 97% (16 Mar 2025 03:27) (96% - 100%)    Parameters below as of 16 Mar 2025 00:00  Patient On (Oxygen Delivery Method): tracheostomy collar    Physical Exam:  Gen: NAD, tracheostomy with ETT  HEENT: EOMI, MMM  Chest: equal chest rise  Cardiac: regular   Abd: non distended    Labs:                        8.1    7.74  )-----------( 151      ( 15 Mar 2025 09:38 )             25.5     CBC Full  -  ( 15 Mar 2025 09:38 )  WBC Count : 7.74 K/uL  RBC Count : 2.63 M/uL  Hemoglobin : 8.1 g/dL  Hematocrit : 25.5 %  Platelet Count - Automated : 151 K/uL  Mean Cell Volume : 97.0 fl  Mean Cell Hemoglobin : 30.8 pg  Mean Cell Hemoglobin Concentration : 31.8 g/dL    03-15    141  |  106  |  38[H]  ----------------------------<  85  3.5   |  20[L]  |  2.28[H]    Ca    9.9      15 Mar 2025 09:38  Phos  3.1     03-15  Mg     1.9     03-15    TPro  6.8  /  Alb  2.9[L]  /  TBili  0.5  /  DBili  x   /  AST  50[H]  /  ALT  37  /  AlkPhos  251[H]  03-14

## 2025-03-16 NOTE — PROGRESS NOTE ADULT - ASSESSMENT
Mr. Gr is a 69 year old male with PMHx of seizure disorder, sleep apnea, HTN, chronic idiopathic constipation, stage III CKD, severe obesity, secondary hyperparathyroidism, anemia, thrombocytopenia, hyperlipidemia, testicular cancer under treatment with Dr. Ovalles who is admitted for acute hypoxic respiratory failure secondary to COVID vs superimposed pneumonia with new onset thrombocytopenia. He was recently discharged 02/06/2025 after a tenous stay including intubation in the ICU for respiratory failure in setting of seizure. He had recovered and was discharged to rehab.      Former smoker, quit 14y prior, denied ETOH, drug use. Lives at home. Does not have children. Denies family history of blood disorders or malignancy.  Denies previous workplace related exposures.  Denies previous history of blood transfusions.  Denied history of thromboses.  Denied history of  requiring anticoagulation.     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  Margins were negative.  pT3Nx. CT C/A/P with few left para-aortic and left iliac chain lymph nodes largest measuring 8.1 cm left para-aortic node, bilateral subcentimeter noncalcified pulmonary nodules measuring up to 7 mm. AFP, LDH, hCG were all elevated. 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 Started on adjuvant therapy with Cisplatin+Etoposide, so far completed 4 cycles of treatment with cisplatin dose reduced 50% and Etoposide 50% due to thrombocytopenia.      02/19/2025: on HFNC, noted tachycardia and fever, Klebsiella in urine as well, thrombocytopenia stable/improving, no signs of active bleeding     02/20/2025: developed A.fib with RVR and was placed on heparin drip and pending cardiology eval    02/21/2025: awake, on AVAPS overnight, noted RRT for hypoxia as pt removed HFNC at some point in time, good response thereafter     02/22/2025:  continues on AVAPS this morning, noted RRT overnight for hypoxia, hypercapnia, ICU following, US LE negative for DVT, counts overall stable/improved     02/23/2025: progressive respiratory failure, noted ongoing RTT this morning with pending intubation and transfer to MICU     02/24/2025: intubated 02/23/2025, sedated, on pressor support, no signs of bleeding, counts noted, no signs of bleeding     02/25/2025: continues in MICU, intubated and sedated, no signs of bleeding    02/26/2025: continues in MICU, intubated, without signs of bleeding, continues on heparin drip     02/27/2025:  remains under the care of the ICU, intubated, no signs of bleeding and continues on heparin drip, counts stable, has not required PRBC support this admission    02/28/2025: continues under the care of MICU, continues intubated, no signs of bleeding, on heparin drip    03/01/2025: continues in MICU, intubated, direct josefina IgG positive, eluate negative, not likely to be clinically significant     03/02/2025:  continues in MICU, nursing staff at bedside, no overnight events noted. CTH without acute intracranial pathology     03/03/2025: continues in MICU, intubated, on heparin drip, 1u PRBC overnight, no signs of bleeding, platelet count stable     03/04/2025: awake, moving arms spontaneously on exam, continues without much interaction however. No signs of bleeding, continues heparin drip, continues intubated in MICU     03/05/2025: extubated 03/04/2025, continues in MICU, awake and alert this morning and able to speak full sentences, discussed/reviewed findings, continues on heparin drip     03/06/2025: nursing staff at bedside, bipap overnight, without signs of bleeding, counts noted stable, renal function improving     03/07/2025:  no signs of bleeding, counts noted, continues heparin drip, continues in MICU    03/08/2025: on HFNC, no signs of bleeding, although alert and answering questions, not back to his baseline yet, continues in MICU    03/09/2025: continues in MICU, s/p re-intubation 03/08/2025 given respiratory compromise in the setting of copious secretions as evidenced by bronchoscopy, in setting of fever, now sedated, mild crusting of blood around mouth, without active sign of bleeding, counts noted reviewed, continues on heparin drip     03/10/2025: continues in MICU, no signs of bleeding, discussed with nursing staff at bedside, receiving 1u PRBC due to H/H downtrend, sputum culture with Pseudomonas, ICU and ID recs noted, continues to be intubated     03/11/2025: continues in MICU, intubated and sedated, noted counts, without signs of bleeding, appropriate response to transfusion    03/12/2025: continues in MICU, intubated and sedated, without signs of bleeding     03/13/2025: continues in MICU, s/p Tracheostomy 03/12/2025 with tracheal intubation, continues with sedation     03/14/2025:  awake and alert, mental status much improved, transferred from MICU to [address] [place] [address] 03/13/2025. Without significant events overnight, discussed with nursing staff at bedside, stated pt did well overnight, no signs of bleeding, no fever noted, medications provided via NGT and he is pending PEG-Tube placement today. His case is complex, noted with repeat need for intubations, discussed with pt, he is aware. Discussed with pts primary Oncologist as well.    03/15/2025: awake and alert, no overnight events noted, discussed with nursing staff, he did well overnight, no fevers, no signs of bleeding endorsed. He nods to information. Counts reviewed overall stable, discussed with him as well. Had PEG placed 03/14/2025       #Acute hypoxic respiratory failure, Acute, Severe   # COVID  - CT noted with bilateral GGO  - ID following  - Pulmonary following  - Antibiotics per primary team/MICU and ID   - Intubated 02/23/2025 AM, MICU   - extubated 03/04/2025  - Pseudomonas from sputum culture   - Re-intubated 03/09/2025 due to increased work of breathing in setting of increased secretions, not protecting airway, tachypnea, hypoxia   - s/p Tracheostomy 03/12/2025      # Thrombocytopenia, Acute, Moderate   - Did occur during most recent admission and improved to normal prior to discharge   - Without signs of bleeding  - Could be multifactorial, possibly due to infection   - Continue to trend  - Transfuse to maintain Plt > 10k unless actively bleeding then maintain > 50k     # Brain lesion, Acute, Severe   - pt with hx of seizures  - MRI brain now with 5.4 mm enhancing lesion in the LEFT middle cerebellar peduncle with associated edema suspicious for metastases  - MRI Lumbar negative for acute disease  - Small lesion without mass effect or edema, recommended to correlate per neurology if foci and locus are concordant with seizure activity  - Neurology recs noted, new lesion not likely to cause seizure  - It is rare, but nonetheless not impossible, for testicular cancer to be metastatic to the brain  - He will need another PET-CT, can be completed outpatient once stable if concern can proceed with biopsy at that time   - Previous endocrinology eval for thyroid nodule noted  - AFP/BHCH normal,  previously   - Repeat CTH without acute intracranial pathology       # 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 Ovalles, agree with current plan  - Follow up as outpatient with Franki Ovalles MD     # Acute kidney injury on CKD Stage IIIA, Acute, Moderate   - Likely multifactorial  - Nephrology consult and recs noted  - Continue to trend     # Normocytic anemia, Acute, Severe   - Chronic, has hx of PRBC during chemo 07/2024  - Noted Anti E, Anti-K Anti-C antibodies previously  - direct josefina IgG positive, eluate negative, not likely to be clinically significant   - s/p 2u PRBC 03/03/2025 and 03/10/2025   - Monitor for bleeding  - Recommend to transfuse to maintain Hb > 7    # Klebsiella UTI, Acute, Severe > Moderate   # Pseudomonas UTI   -Antibiotics per ID and primary team       Recommendations  - Trend CBC daily, obtain post transfusion CBC today   - Transfuse to maintain Hb > 7   - Transfuse to maintain Plt >10k unless active bleeding then >50k   - Monitor renal function  - Cardiology follow up to address therapeutic anticoagulation, was on heparin drip   - Consider at least Hep SubQ for DVT PPx         Thank you for allowing me to participate in the care of Mr. Gr please do not hesitate to call or text me if you have further questions or concerns.     Brayan Mart MD  [facility] [place]   Division of Hematology/Oncology  [address], [address]  [place] [address]  P: [phone]  F: [phone]    Attestation:    ----Pt evaluated, >50min spent including face-to-face interaction in addition to chart review, reviewing treatment plan, discussing with care staff in hospital, his outside physician, and managing the patient’s chronic diagnoses as listed in the assessment----        Mr. Gr is a 69 year old male with PMHx of seizure disorder, sleep apnea, HTN, chronic idiopathic constipation, stage III CKD, severe obesity, secondary hyperparathyroidism, anemia, thrombocytopenia, hyperlipidemia, testicular cancer under treatment with Dr. Ovalles who is admitted for acute hypoxic respiratory failure secondary to COVID vs superimposed pneumonia with new onset thrombocytopenia. He was recently discharged 02/06/2025 after a tenous stay including intubation in the ICU for respiratory failure in setting of seizure. He had recovered and was discharged to rehab.      Former smoker, quit 14y prior, denied ETOH, drug use. Lives at home. Does not have children. Denies family history of blood disorders or malignancy.  Denies previous workplace related exposures.  Denies previous history of blood transfusions.  Denied history of thromboses.  Denied history of  requiring anticoagulation.     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  Margins were negative.  pT3Nx. CT C/A/P with few left para-aortic and left iliac chain lymph nodes largest measuring 8.1 cm left para-aortic node, bilateral subcentimeter noncalcified pulmonary nodules measuring up to 7 mm. AFP, LDH, hCG were all elevated. 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 Started on adjuvant therapy with Cisplatin+Etoposide, so far completed 4 cycles of treatment with cisplatin dose reduced 50% and Etoposide 50% due to thrombocytopenia.      02/19/2025: on HFNC, noted tachycardia and fever, Klebsiella in urine as well, thrombocytopenia stable/improving, no signs of active bleeding     02/20/2025: developed A.fib with RVR and was placed on heparin drip and pending cardiology eval    02/21/2025: awake, on AVAPS overnight, noted RRT for hypoxia as pt removed HFNC at some point in time, good response thereafter     02/22/2025:  continues on AVAPS this morning, noted RRT overnight for hypoxia, hypercapnia, ICU following, US LE negative for DVT, counts overall stable/improved     02/23/2025: progressive respiratory failure, noted ongoing RTT this morning with pending intubation and transfer to MICU     02/24/2025: intubated 02/23/2025, sedated, on pressor support, no signs of bleeding, counts noted, no signs of bleeding     02/25/2025: continues in MICU, intubated and sedated, no signs of bleeding    02/26/2025: continues in MICU, intubated, without signs of bleeding, continues on heparin drip     02/27/2025:  remains under the care of the ICU, intubated, no signs of bleeding and continues on heparin drip, counts stable, has not required PRBC support this admission    02/28/2025: continues under the care of MICU, continues intubated, no signs of bleeding, on heparin drip    03/01/2025: continues in MICU, intubated, direct josefina IgG positive, eluate negative, not likely to be clinically significant     03/02/2025:  continues in MICU, nursing staff at bedside, no overnight events noted. CTH without acute intracranial pathology     03/03/2025: continues in MICU, intubated, on heparin drip, 1u PRBC overnight, no signs of bleeding, platelet count stable     03/04/2025: awake, moving arms spontaneously on exam, continues without much interaction however. No signs of bleeding, continues heparin drip, continues intubated in MICU     03/05/2025: extubated 03/04/2025, continues in MICU, awake and alert this morning and able to speak full sentences, discussed/reviewed findings, continues on heparin drip     03/06/2025: nursing staff at bedside, bipap overnight, without signs of bleeding, counts noted stable, renal function improving     03/07/2025:  no signs of bleeding, counts noted, continues heparin drip, continues in MICU    03/08/2025: on HFNC, no signs of bleeding, although alert and answering questions, not back to his baseline yet, continues in MICU    03/09/2025: continues in MICU, s/p re-intubation 03/08/2025 given respiratory compromise in the setting of copious secretions as evidenced by bronchoscopy, in setting of fever, now sedated, mild crusting of blood around mouth, without active sign of bleeding, counts noted reviewed, continues on heparin drip     03/10/2025: continues in MICU, no signs of bleeding, discussed with nursing staff at bedside, receiving 1u PRBC due to H/H downtrend, sputum culture with Pseudomonas, ICU and ID recs noted, continues to be intubated     03/11/2025: continues in MICU, intubated and sedated, noted counts, without signs of bleeding, appropriate response to transfusion    03/12/2025: continues in MICU, intubated and sedated, without signs of bleeding     03/13/2025: continues in MICU, s/p Tracheostomy 03/12/2025 with tracheal intubation, continues with sedation     03/14/2025:  awake and alert, mental status much improved, transferred from MICU to [address] [place] [address] 03/13/2025. Without significant events overnight, discussed with nursing staff at bedside, stated pt did well overnight, no signs of bleeding, no fever noted, medications provided via NGT and he is pending PEG-Tube placement today. His case is complex, noted with repeat need for intubations, discussed with pt, he is aware. Discussed with pts primary Oncologist as well.    03/15/2025: awake and alert, no overnight events noted, discussed with nursing staff, he did well overnight, no fevers, no signs of bleeding endorsed. He nods to information. Counts reviewed overall stable, discussed with him as well. Had PEG placed 03/14/2025 03/16/2025: no overnight events noted, comfortable overnight, nursing staff at bedside, stated pt did well, no signs of bleeding, had a BM. No fever reported. Counts reviewed, stable at this time         #Acute hypoxic respiratory failure, Acute, Severe   # COVID  - CT noted with bilateral GGO  - ID following  - Pulmonary following  - Antibiotics per primary team/MICU and ID   - Intubated 02/23/2025 AM, MICU   - extubated 03/04/2025  - Pseudomonas from sputum culture   - Re-intubated 03/09/2025 due to increased work of breathing in setting of increased secretions, not protecting airway, tachypnea, hypoxia   - s/p Tracheostomy 03/12/2025      # Thrombocytopenia, Acute, Moderate   - Did occur during most recent admission and improved to normal prior to discharge   - Without signs of bleeding  - Could be multifactorial, possibly due to infection   - Continue to trend  - Transfuse to maintain Plt > 10k unless actively bleeding then maintain > 50k     # Brain lesion, Acute, Severe   - pt with hx of seizures  - MRI brain now with 5.4 mm enhancing lesion in the LEFT middle cerebellar peduncle with associated edema suspicious for metastases  - MRI Lumbar negative for acute disease  - Small lesion without mass effect or edema, recommended to correlate per neurology if foci and locus are concordant with seizure activity  - Neurology recs noted, new lesion not likely to cause seizure  - It is rare, but nonetheless not impossible, for testicular cancer to be metastatic to the brain  - He will need another PET-CT, can be completed outpatient once stable if concern can proceed with biopsy at that time   - Previous endocrinology eval for thyroid nodule noted  - AFP/BHCH normal,  previously   - Repeat CTH without acute intracranial pathology       # 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 Ovalles, agree with current plan  - Follow up as outpatient with Franki Ovalles MD     # Acute kidney injury on CKD Stage IIIA, Acute, Moderate   - Likely multifactorial  - Nephrology consult and recs noted  - Continue to trend     # Normocytic anemia, Acute, Severe   - Chronic, has hx of PRBC during chemo 07/2024  - Noted Anti E, Anti-K Anti-C antibodies previously  - direct josefina IgG positive, eluate negative, not likely to be clinically significant   - s/p 2u PRBC 03/03/2025 and 03/10/2025   - Monitor for bleeding  - Recommend to transfuse to maintain Hb > 7    # Klebsiella UTI, Acute, Severe > Moderate   # Pseudomonas UTI   -Antibiotics per ID and primary team       Recommendations  - Trend CBC daily, obtain post transfusion CBC today   - Transfuse to maintain Hb > 7   - Transfuse to maintain Plt >10k unless active bleeding then >50k   - Monitor renal function  - Cardiology follow up to address therapeutic anticoagulation, was on heparin drip   - Consider  Hep SubQ for DVT PPx         Thank you for allowing me to participate in the care of Mr. Gr please do not hesitate to call or text me if you have further questions or concerns.     Brayan Mart MD  [facility] [place]   Division of Hematology/Oncology  [address] [place] [address], [address]  [place] [address]  P: [phone]  F: [phone]    Attestation:    ----Pt evaluated, >50min spent including face-to-face interaction in addition to chart review, reviewing treatment plan, discussing with care staff in hospital, his outside physician, and managing the patient’s chronic diagnoses as listed in the assessment----        Mr. Gr is a 69 year old male with PMHx of seizure disorder, sleep apnea, HTN, chronic idiopathic constipation, stage III CKD, severe obesity, secondary hyperparathyroidism, anemia, thrombocytopenia, hyperlipidemia, testicular cancer under treatment with Dr. Ovalles who is admitted for acute hypoxic respiratory failure secondary to COVID vs superimposed pneumonia with new onset thrombocytopenia. He was recently discharged 02/06/2025 after a tenous stay including intubation in the ICU for respiratory failure in setting of seizure. He had recovered and was discharged to rehab.      Former smoker, quit 14y prior, denied ETOH, drug use. Lives at home. Does not have children. Denies family history of blood disorders or malignancy.  Denies previous workplace related exposures.  Denies previous history of blood transfusions.  Denied history of thromboses.  Denied history of  requiring anticoagulation.     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  Margins were negative.  pT3Nx. CT C/A/P with few left para-aortic and left iliac chain lymph nodes largest measuring 8.1 cm left para-aortic node, bilateral subcentimeter noncalcified pulmonary nodules measuring up to 7 mm. AFP, LDH, hCG were all elevated. 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 Started on adjuvant therapy with Cisplatin+Etoposide, so far completed 4 cycles of treatment with cisplatin dose reduced 50% and Etoposide 50% due to thrombocytopenia.      02/19/2025: on HFNC, noted tachycardia and fever, Klebsiella in urine as well, thrombocytopenia stable/improving, no signs of active bleeding     02/20/2025: developed A.fib with RVR and was placed on heparin drip and pending cardiology eval    02/21/2025: awake, on AVAPS overnight, noted RRT for hypoxia as pt removed HFNC at some point in time, good response thereafter     02/22/2025:  continues on AVAPS this morning, noted RRT overnight for hypoxia, hypercapnia, ICU following, US LE negative for DVT, counts overall stable/improved     02/23/2025: progressive respiratory failure, noted ongoing RTT this morning with pending intubation and transfer to MICU     02/24/2025: intubated 02/23/2025, sedated, on pressor support, no signs of bleeding, counts noted, no signs of bleeding     02/25/2025: continues in MICU, intubated and sedated, no signs of bleeding    02/26/2025: continues in MICU, intubated, without signs of bleeding, continues on heparin drip     02/27/2025:  remains under the care of the ICU, intubated, no signs of bleeding and continues on heparin drip, counts stable, has not required PRBC support this admission    02/28/2025: continues under the care of MICU, continues intubated, no signs of bleeding, on heparin drip    03/01/2025: continues in MICU, intubated, direct josefina IgG positive, eluate negative, not likely to be clinically significant     03/02/2025:  continues in MICU, nursing staff at bedside, no overnight events noted. CTH without acute intracranial pathology     03/03/2025: continues in MICU, intubated, on heparin drip, 1u PRBC overnight, no signs of bleeding, platelet count stable     03/04/2025: awake, moving arms spontaneously on exam, continues without much interaction however. No signs of bleeding, continues heparin drip, continues intubated in MICU     03/05/2025: extubated 03/04/2025, continues in MICU, awake and alert this morning and able to speak full sentences, discussed/reviewed findings, continues on heparin drip     03/06/2025: nursing staff at bedside, bipap overnight, without signs of bleeding, counts noted stable, renal function improving     03/07/2025:  no signs of bleeding, counts noted, continues heparin drip, continues in MICU    03/08/2025: on HFNC, no signs of bleeding, although alert and answering questions, not back to his baseline yet, continues in MICU    03/09/2025: continues in MICU, s/p re-intubation 03/08/2025 given respiratory compromise in the setting of copious secretions as evidenced by bronchoscopy, in setting of fever, now sedated, mild crusting of blood around mouth, without active sign of bleeding, counts noted reviewed, continues on heparin drip     03/10/2025: continues in MICU, no signs of bleeding, discussed with nursing staff at bedside, receiving 1u PRBC due to H/H downtrend, sputum culture with Pseudomonas, ICU and ID recs noted, continues to be intubated     03/11/2025: continues in MICU, intubated and sedated, noted counts, without signs of bleeding, appropriate response to transfusion    03/12/2025: continues in MICU, intubated and sedated, without signs of bleeding     03/13/2025: continues in MICU, s/p Tracheostomy 03/12/2025 with tracheal intubation, continues with sedation     03/14/2025:  awake and alert, mental status much improved, transferred from MICU to [address] [place] [address] 03/13/2025. Without significant events overnight, discussed with nursing staff at bedside, stated pt did well overnight, no signs of bleeding, no fever noted, medications provided via NGT and he is pending PEG-Tube placement today. His case is complex, noted with repeat need for intubations, discussed with pt, he is aware. Discussed with pts primary Oncologist as well.    03/15/2025: awake and alert, no overnight events noted, discussed with nursing staff, he did well overnight, no fevers, no signs of bleeding endorsed. He nods to information. Counts reviewed overall stable, discussed with him as well. Had PEG placed 03/14/2025 03/16/2025: no overnight events noted, comfortable overnight, nursing staff at bedside, stated pt did well, no signs of bleeding, had a BM. No fever reported. Counts reviewed, stable at this time         #Acute hypoxic respiratory failure, Acute, Severe   # COVID  - CT noted with bilateral GGO  - ID following  - Pulmonary following  - Antibiotics per primary team/MICU and ID   - Intubated 02/23/2025 AM, MICU   - extubated 03/04/2025  - Pseudomonas from sputum culture   - Re-intubated 03/09/2025 due to increased work of breathing in setting of increased secretions, not protecting airway, tachypnea, hypoxia   - s/p Tracheostomy 03/12/2025    # Thrombocytopenia, Acute, Moderate   - Did occur during most recent admission and improved to normal prior to discharge   - Without signs of bleeding  - Could be multifactorial, possibly due to infection   - Continue to trend  - Transfuse to maintain Plt > 10k unless actively bleeding then maintain > 50k     # Brain lesion, Acute, Severe   - pt with hx of seizures  - MRI brain now with 5.4 mm enhancing lesion in the LEFT middle cerebellar peduncle with associated edema suspicious for metastases  - MRI Lumbar negative for acute disease  - Small lesion without mass effect or edema, recommended to correlate per neurology if foci and locus are concordant with seizure activity  - Neurology recs noted, new lesion not likely to cause seizure  - It is rare, but nonetheless not impossible, for testicular cancer to be metastatic to the brain  - He will need another PET-CT, can be completed outpatient once stable if concern can proceed with biopsy at that time   - Previous endocrinology eval for thyroid nodule noted  - AFP/[facility] normal,  previously   - Repeat CTH without acute intracranial pathology     # 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 Ovalles, agree with current plan  - Follow up as outpatient with Franki Ovalles MD     # Acute kidney injury on CKD Stage IIIA, Acute, Moderate   - Likely multifactorial  - Nephrology consult and recs noted  - On lovenox for DVT PPx, monitor renal function   - Continue to trend     # Normocytic anemia, Acute, Severe   - Chronic, has hx of PRBC during chemo 07/2024  - Noted Anti E, Anti-K Anti-C antibodies previously  - direct josefina IgG positive, eluate negative, not likely to be clinically significant   - s/p 2u PRBC 03/03/2025 and 03/10/2025   - Monitor for bleeding  - Recommend to transfuse to maintain Hb > 7    # Klebsiella UTI, Acute, Severe > Moderate   # Pseudomonas UTI   -Antibiotics per ID and primary team       Recommendations  - Trend CBC daily, obtain post transfusion CBC today   - Transfuse to maintain Hb > 7   - Transfuse to maintain Plt >10k unless active bleeding then >50k   - On lovenox for DVT PPx, monitor renal function   - Cardiology follow up to address therapeutic anticoagulation, was on heparin drip         Thank you for allowing me to participate in the care of Mr. Gr please do not hesitate to call or text me if you have further questions or concerns.     Brayan Mart MD  [facility]   Division of Hematology/Oncology  [address] [place] [address], [address]  [place] [address]  P: [phone]  F: [phone]    Attestation:    ----Pt evaluated, >50min spent including face-to-face interaction in addition to chart review, reviewing treatment plan, discussing with care staff in hospital, his outside physician, and managing the patient’s chronic diagnoses as listed in the assessment----

## 2025-03-16 NOTE — PROGRESS NOTE ADULT - NUTRITIONAL ASSESSMENT
This patient has been assessed with a concern for Malnutrition and has been determined to have a diagnosis/diagnoses of Severe protein-calorie malnutrition.    This patient is being managed with:   Diet NPO with Tube Feed-  Tube Feeding Modality: Nasogastric  Jevity 1.2 Garth (JEVITY1.2RTH)  Total Volume for 24 Hours (mL): 1080  Continuous  Starting Tube Feed Rate {mL per Hour}: 10  Increase Tube Feed Rate by (mL): 10     Every 4 hours  Until Goal Tube Feed Rate (mL per Hour): 60  Tube Feed Duration (in Hours): 18  Tube Feed Start Time: 11:00  Entered: Mar 15 2025  2:05PM

## 2025-03-16 NOTE — PROGRESS NOTE ADULT - ASSESSMENT
69 year old male with a past medical history of hypertension, hyperlipemia VAZQUEZ (on CPAP at bedtime, NC 2 liters during the day), CKD stage 3, epilepsy, schizophrenia, developmental delay, metastatic testicular cancer (s/p orchiectomy, actively on chemotherapy, last dose of etoposide/cisplatin on 6/2024) presenting with worsening shortness of breath. Patient was recently hospitalized at [facility] from January 27th 2025 to February 6th 2025 for seizure and influenza virus infection, which required intubation. He was sent to rehab (originally from home) from hospital. He returns due to sudden onset shortness of breath and worsening oxygenation. Of note, he tested positive for COVID-19       1- SHAGUFTA on CKDIII  2- covid-19  3- anemia  4- hypotension  5- respiratory failure         SHAGUFTA suspected in setting of new infection. covid 19   cr and bun  at a plateau range   bp is in range   trend cr and lytes   hyperphosphatemia   renvela 1600 mg tid  anemia, trend hb ---> retacrit [id number] U tiw sq  cont trach collar   d.w RCU  team when seen earlier

## 2025-03-16 NOTE — PROGRESS NOTE ADULT - SUBJECTIVE AND OBJECTIVE BOX
DATE OF SERVICE: 03-16-25 @ 16:41    Patient is a 69y old  Male who presents with a chief complaint of Acute on chronic hypoxemic respiratory failure (16 Mar 2025 07:31)      INTERVAL HISTORY: Feels ok, sitting in chair    REVIEW OF SYSTEMS:   CONSTITUTIONAL: No weakness  EYES/ENT: No visual changes;  No throat pain   NECK: No pain or stiffness  RESPIRATORY: No cough, wheezing; No shortness of breath  CARDIOVASCULAR: No chest pain or palpitations  GASTROINTESTINAL: No abdominal  pain. No nausea, vomiting, or hematemesis  GENITOURINARY: No dysuria, frequency or hematuria  NEUROLOGICAL: No stroke like symptoms  SKIN: No rashes    TELEMETRY Personally reviewed:  80-90s  	  MEDICATIONS:        PHYSICAL EXAM:  T(C): 36.8 (03-16-25 @ 13:10), Max: 36.9 (03-16-25 @ 05:00)  HR: 90 (03-16-25 @ 15:12) (72 - 99)  BP: 120/70 (03-16-25 @ 13:10) (120/70 - 162/87)  RR: 18 (03-16-25 @ 15:12) (18 - 18)  SpO2: 99% (03-16-25 @ 15:12) (96% - 100%)  Wt(kg): --  I&O's Summary    15 Mar 2025 07:01  -  16 Mar 2025 07:00  --------------------------------------------------------  IN: 1090 mL / OUT: 800 mL / NET: 290 mL    16 Mar 2025 07:01  -  16 Mar 2025 16:41  --------------------------------------------------------  IN: 600 mL / OUT: 0 mL / NET: 600 mL          Appearance: In no distress	  HEENT:    PERRL, EOMI	  Cardiovascular:  S1 S2, No JVD  Respiratory: Rhonchi bilaterally  Gastrointestinal:  Soft, Non-tender, + BS	  Vascularature:  No edema of LE  Psychiatric: Appropriate affect   Neuro: no acute focal deficits                               8.1    7.61  )-----------( 162      ( 16 Mar 2025 09:28 )             25.4     03-16    139  |  105  |  32[H]  ----------------------------<  108[H]  3.6   |  22  |  2.01[H]    Ca    9.9      16 Mar 2025 09:29  Phos  2.9     03-16  Mg     1.8     03-16          Labs personally reviewed      ASSESSMENT/PLAN: 	    69 year old male with a past medical history of hypertension, hyperlipemia VAZQUEZ (on CPAP at bedtime, NC 2 liters during the day), CKD stage 3, epilepsy, schizophrenia, developmental delay, metastatic testicular cancer (s/p orchiectomy, actively on chemotherapy, last dose of etoposide/cisplatin on 6/2024), presenting with acute on chronic hypoxemic respiratory failure, secondary to (a) COVID-19 infection, (b) superimposed pneumonia after recent influenza infection, and/or (c) fluid overload.     Problem/Plan - 1:  ·  Problem: Acute on chronic respiratory failure with hypoxia.   ·  Plan: Likely 2/2 PNA, HF  - Appreciate pulmonology.  - Transferred to MICU for hypoxia. Appreciate MICU care.   - 2/24 started on Bumex gtt at 2mg/hr  - 2/26 No appreciable JVD or peripheral edema. BP now soft with labile but stable Cr. Does not appear to be a HF. Now off diuretics.   --- 2/26 CT Chest shows new and worsening confluent areas of consolidation/pneumonia in the mid to lower lungs.  - CXR 3/10 with unchanged patchy opacities throughout the left lung    Problem/Plan - 2:  ·  Problem: SHAGUFTA (acute kidney injury).   ·  Plan: Has a history of CKD stage 3, Cr 2.38 at baseline. Presents with Cr 3.27.   - Nephrology following  - BUN now improved following de-escalation of diuretics    Problem/Plan - 3:  ·  Problem: Chronic heart failure with preserved ejection fraction.   ·  Plan: TTE done here 1/17/25 technically difficult, but LV systolic fxn appears nl without any regional wall motion abnormalities  - Repeat TTE pending  - BNP 5000 <---1100 but appears euvolemic   - s/p bumex gtt per ICU    Problem/Plan - 4:  ·  Problem: New onset Afib RVR (on tele, confirmed with EKG 2/19)   ·  Plan:  - Unable to tolerate rate control 2/2 hypotension  - AC on hold given thrombocytopenia and anemia    Problem/Plan - 5:  ·  Problem: HTN    ·  Plan: Still on pressors as per MICU    Problem/Plan - 6:  ·  Problem: Cardiac Risk Stratification   ·  Plan: Patient is high risk given pressor dependent for mod risk PEG placement. No contraindication to proceed.   - Tele with stable HR SR with PACs  - Patient with preserved EF, not currently in decompensated HF  - No significant valvular dysfunction            SAAD Lagos DO [facility]  Cardiovascular Medicine  [address] [facility], [address]  Available through call or text on [employer] TEAM[employer]  Office: [phone]   DATE OF SERVICE: 03-16-25 @ 16:41    Patient is a 69y old  Male who presents with a chief complaint of Acute on chronic hypoxemic respiratory failure (16 Mar 2025 07:31)      INTERVAL HISTORY: Feels ok, sitting in chair    REVIEW OF SYSTEMS:   CONSTITUTIONAL: No weakness  EYES/ENT: No visual changes;  No throat pain   NECK: No pain or stiffness  RESPIRATORY: No cough, wheezing; No shortness of breath  CARDIOVASCULAR: No chest pain or palpitations  GASTROINTESTINAL: No abdominal  pain. No nausea, vomiting, or hematemesis  GENITOURINARY: No dysuria, frequency or hematuria  NEUROLOGICAL: No stroke like symptoms  SKIN: No rashes    TELEMETRY Personally reviewed:  80-90s  	  MEDICATIONS:        PHYSICAL EXAM:  T(C): 36.8 (03-16-25 @ 13:10), Max: 36.9 (03-16-25 @ 05:00)  HR: 90 (03-16-25 @ 15:12) (72 - 99)  BP: 120/70 (03-16-25 @ 13:10) (120/70 - 162/87)  RR: 18 (03-16-25 @ 15:12) (18 - 18)  SpO2: 99% (03-16-25 @ 15:12) (96% - 100%)  Wt(kg): --  I&O's Summary    15 Mar 2025 07:01  -  16 Mar 2025 07:00  --------------------------------------------------------  IN: 1090 mL / OUT: 800 mL / NET: 290 mL    16 Mar 2025 07:01  -  16 Mar 2025 16:41  --------------------------------------------------------  IN: 600 mL / OUT: 0 mL / NET: 600 mL          Appearance: In no distress	  HEENT:    PERRL, EOMI	  Cardiovascular:  S1 S2, No JVD  Respiratory: Rhonchi bilaterally  Gastrointestinal:  Soft, Non-tender, + BS	  Vascularature:  No edema of LE  Psychiatric: Appropriate affect   Neuro: no acute focal deficits                               8.1    7.61  )-----------( 162      ( 16 Mar 2025 09:28 )             25.4     03-16    139  |  105  |  32[H]  ----------------------------<  108[H]  3.6   |  22  |  2.01[H]    Ca    9.9      16 Mar 2025 09:29  Phos  2.9     03-16  Mg     1.8     03-16          Labs personally reviewed      ASSESSMENT/PLAN: 	    69 year old male with a past medical history of hypertension, hyperlipemia VAZQUEZ (on CPAP at bedtime, NC 2 liters during the day), CKD stage 3, epilepsy, schizophrenia, developmental delay, metastatic testicular cancer (s/p orchiectomy, actively on chemotherapy, last dose of etoposide/cisplatin on 6/2024), presenting with acute on chronic hypoxemic respiratory failure, secondary to (a) COVID-19 infection, (b) superimposed pneumonia after recent influenza infection, and/or (c) fluid overload.     Problem/Plan - 1:  ·  Problem: Acute on chronic respiratory failure with hypoxia.   ·  Plan: Likely 2/2 PNA, HF  - Appreciate pulmonology.  - Transferred to RCU    Problem/Plan - 2:  ·  Problem: SHAGUFTA (acute kidney injury).   ·  Plan: Has a history of CKD stage 3, Cr 2.38 at baseline.    - Nephrology following  - BUN now improved following de-escalation of diuretics    Problem/Plan - 3:  ·  Problem: Chronic heart failure with preserved ejection fraction.   ·  Plan: TTE done here 1/17/25 technically difficult, but LV systolic fxn appears nl without any regional wall motion abnormalities  - Euvolemic, repeat BNP. On 2/23 2300    Problem/Plan - 4:  ·  Problem: New onset Afib RVR (on tele, confirmed with EKG 2/19)   ·  Plan:  - Rec Metoprolol 25mg BID  - Rec Eliquis 5mg BID      Problem/Plan - 5:  ·  Problem: HTN    ·  Plan: Resolved    Problem/Plan - 6:  ·  Problem: Cardiac Risk Stratification   ·  Plan: Patient is high risk given pressor dependent for mod risk PEG placement. No contraindication to proceed.   - Tele with stable HR SR with PACs  - Patient with preserved EF, not currently in decompensated HF  - No significant valvular dysfunction            SAAD Lagos DO [facility]  Cardiovascular Medicine  [address] [place] [address], [address]  Available through call or text on [employer] TEAM[employer]  Office: [phone]

## 2025-03-16 NOTE — PROGRESS NOTE ADULT - PROBLEM SELECTOR PLAN 9
SHAGUFTA on CKD stage 3 suspected in setting of new infection Covid 19   - CR peaked to 4.34 on 3/3, now improving   - hyperphosphatemia, continue Renvela 1600 mg tid  - trend hb add Retacrit [id number] U tiw sq  for trach

## 2025-03-16 NOTE — PROGRESS NOTE ADULT - NS ATTEND AMEND GEN_ALL_CORE FT
70 y/o M w/hx as above including metastatic testicular cancer, epilepsy on AEDs, chronic respiratory failure with hypoxia and hypercapnia on home O2, HFpEF admitted for acute on chronic respiratory failure with hypoxia and hypercapnia likely secondary to combination of COVID PNA, bacterial superinfection, and acute pulmonary edema due to acute on chronic HFpEF. Hypotension likely severe sepsis with septic shock. SHAGUFTA likely ATN +/- venous congestion. Repeat CT chest with bilateral GGOs possibly acute pulmonary edema vs early fibrosis vs residual COVID along with L basilar consolidation likely bacterial PNA vs atelectasis. Now s/p trach awaiting PEG.    # Acute on chronic respiratory failure with hypoxia and hypercapnia  # COVID PNA  # Bacterial PNA  # Acute pulmonary edema  # Acute on chronic HFpEF  # Hypotension  # Severe sepsis with septic shock  # SHAGUFTA  # Hypernatremia  # Epilepsy    - Continue AEDs  - Tolerating TC, follow up ABG  - Trend Cr, avoid nephrotoxins  - Abx as per ID  - Completed course of dexamethasone for COVID, completed remdesivir  - Hypernatremia resolved  - Therapeutic AC on hold for Afib due to prior hgb drops, but H/H now stable, will start Lovenox ppx and monitor hgb  -OOB to chair, PT  - Full code

## 2025-03-16 NOTE — PROGRESS NOTE ADULT - PROBLEM SELECTOR PLAN 5
New onset Afib with RVR, EKG 2/19  - Heparin drip stopped to H/H instability  - patient is at increased risk of bleed.   - Will perform Repeat TTE to clear LYNN. If clear, will hold AC moving forward   - restart BB if rates remain uncontrolled

## 2025-03-16 NOTE — PROGRESS NOTE ADULT - ASSESSMENT
69 year old male with a past medical history of hypertension, hyperlipemia VAZQUEZ (on CPAP at bedtime, NC 2 liters during the day), CKD stage 3, epilepsy, schizophrenia, developmental delay, metastatic testicular cancer (s/p orchiectomy, actively on chemotherapy, last dose of etoposide/cisplatin on 6/2024), presenting with acute on chronic hypoxemic respiratory failure, secondary to (a) COVID-19 infection, (b) superimposed pneumonia after recent influenza infection,  Transferred to MICU on 2/23 after RRT on floors due to hypoxia even with max setting AVAPS. Patient started on midodrine and weaned off of levo on 2/24. Failed pressure support on 2/25 and started diuresis with Bumex. CTH was unremarkable and weaned off of Precedex on 3/1. On 3/4 patient extubated and [facility] conversations with family revealed that they would want patient to be trached. On 3/7, patient found to have RLL consolidation on POCUS and started on Zosyn, intubated on 3/8 due to increased lethargy and inability to clear oral secretions. Trach placed on 3/12. Transferred to RCU on 3/13. S/p Peg 3/14.  EGD with normal findings.     3/15. S/p Peg 3/14, EGD with normal finding, Cleared by GI to start Feeds today. BG stable, making urine, net negative ~130ml, keep net negative balance. CR continues to downtrend (2.28), peaked to 4.34 on 3/3. Pseudomonas PNA on + Bal Cx/ recurrent PSA UTI on Levaquin q 48hrs thru 3/16. ID following. Prophylactic AC started, trend H/H and platelets. Repeat echo ordered to evaluate for possible L atrial thrombus.   69 year old male with a past medical history of hypertension, hyperlipemia VAZQUEZ (on CPAP at bedtime, NC 2 liters during the day), CKD stage 3, epilepsy, schizophrenia, developmental delay, metastatic testicular cancer (s/p orchiectomy, actively on chemotherapy, last dose of etoposide/cisplatin on 6/2024), presenting with acute on chronic hypoxemic respiratory failure, secondary to (a) COVID-19 infection, (b) superimposed pneumonia after recent influenza infection,  Transferred to MICU on 2/23 after RRT on floors due to hypoxia even with max setting AVAPS. Patient started on midodrine and weaned off of levo on 2/24. Failed pressure support on 2/25 and started diuresis with Bumex. CTH was unremarkable and weaned off of Precedex on 3/1. On 3/4 patient extubated and [facility] conversations with family revealed that they would want patient to be trached. On 3/7, patient found to have RLL consolidation on POCUS and started on Zosyn, intubated on 3/8 due to increased lethargy and inability to clear oral secretions. Trach placed on 3/12. Transferred to RCU on 3/13. S/p Peg 3/14.  EGD with normal findings.     3/16: Did well overnight on continuous PSV 6/6, Tolerated TC during all day yesterday. Will attempt TC ATC 1st night tonight and f/u AM ABG. CR continues to downtrend (2.01), peaked to 4.34 on 3/3. Pseudomonas PNA on + Bal Cx/ recurrent PSA/ UTI completed Levaquin 3/15.  Prophylactic AC started 3/15, stable H/H and platelets. Repeat echo ordered to evaluate for possible L atrial thrombus.   69 year old male with a past medical history of hypertension, hyperlipemia VAZQUEZ (on CPAP at bedtime, NC 2 liters during the day), CKD stage 3, epilepsy, schizophrenia, developmental delay, metastatic testicular cancer (s/p orchiectomy, actively on chemotherapy, last dose of etoposide/cisplatin on 6/2024), presenting with acute on chronic hypoxemic respiratory failure, secondary to (a) COVID-19 infection, (b) superimposed pneumonia after recent influenza infection,  Transferred to MICU on 2/23 after RRT on floors due to hypoxia even with max setting AVAPS. Patient started on midodrine and weaned off of levo on 2/24. Failed pressure support on 2/25 and started diuresis with Bumex. CTH was unremarkable and weaned off of Precedex on 3/1. On 3/4 patient extubated and [facility] conversations with family revealed that they would want patient to be trached. On 3/7, patient found to have RLL consolidation on POCUS and started on Zosyn, intubated on 3/8 due to increased lethargy and inability to clear oral secretions. Trach placed on 3/12. Transferred to RCU on 3/13. S/p Peg 3/14.  EGD with normal findings.     3/16: Did well overnight on continuous PSV 6/6, Tolerated TC during all day yesterday. Will attempt TC ATC 1st night tonight and f/u AM ABG. CR continues to downtrend (2.01), peaked to 4.34 on 3/3. Pseudomonas PNA on + Bal Cx/ recurrent PSA UTI- completed Levaquin 3/15.  Prophylactic AC started 3/15, stable H/H and platelets. Repeat echo ordered to evaluate for possible L atrial thrombus.

## 2025-03-16 NOTE — PROGRESS NOTE ADULT - PROBLEM SELECTOR PLAN 6
Chronic heart failure with preserved ejection fraction.   ·  Plan: TTE done here 1/17/25 technically difficult, but LV systolic fxn appears nl without any regional wall motion abnormalities  - Repeat TTE ordered 3/15  - BNP 5000 <---1100 but appears euvolemic   - s/p bumex gtt per ICU Chronic heart failure with preserved ejection fraction.   ·  Plan: TTE done here 1/17/25 technically difficult, but LV systolic fxn appears nl without any regional wall motion abnormalities  - Repeat TTE ordered 3/15  - BNP 5000 <---1100 but appears euvolemic   - s/p bumex gtt per ICU  - Appears euvolemic on exam

## 2025-03-16 NOTE — PROGRESS NOTE ADULT - PROBLEM SELECTOR PLAN 11
- Full Code  - Contact: Sister Ester [phone]  - Sister updated by attending physician on 3/14 - Full Code  - Contact: Sister Ester [phone]  - Sister updated at bedside on 3/15

## 2025-03-16 NOTE — PROGRESS NOTE ADULT - SUBJECTIVE AND OBJECTIVE BOX
Patient is a 69y old  Male who presents with a chief complaint of Acute on chronic hypoxemic respiratory failure (16 Mar 2025 05:28)      Interval Events:    REVIEW OF SYSTEMS:  [ ] Positive  [ ] All other systems negative  [ ] Unable to assess ROS because ________    Vital Signs Last 24 Hrs  T(C): 36.9 (03-16-25 @ 05:00), Max: 36.9 (03-15-25 @ 11:51)  T(F): 98.5 (03-16-25 @ 05:00), Max: 98.5 (03-16-25 @ 05:00)  HR: 72 (03-16-25 @ 06:28) (43 - 99)  BP: 142/87 (03-16-25 @ 05:00) (142/87 - 162/87)  RR: 18 (03-16-25 @ 05:00) (18 - 18)  SpO2: 100% (03-16-25 @ 06:28) (96% - 100%)PHYSICAL EXAM:  HEENT:   [ ]Tracheostomy:  [ ]Pupils equal  [ ]No oral lesions  [ ]Abnormal        SKIN  [ ]No Rash  [ ] Abnormal  [ ] pressure    CARDIAC  [ ]Regular  [ ]Abnormal    PULMONARY  [ ]Bilateral Clear Breath Sounds  [ ]Normal Excursion  [ ]Abnormal    GI  [ ]PEG      [ ] +BS		              [ ]Soft, nondistended, nontender	  [ ]Abnormal    MUSCULOSKELETAL                                   [ ]Bedbound                 [ ]Abnormal    [ ]Ambulatory/OOB to chair                           EXTREMITIES                                         [ ]Normal  [ ]Edema                           NEUROLOGIC  [ ] Normal, non focal  [ ] Focal findings:    PSYCHIATRIC  [ ]Alert and appropriate  [ ] Sedated	 [ ]Agitated    :  [place]: [ ] Yes, if yes: Date of Placement:                   [  ] No    LINES: Central Lines [ ] Yes, if yes: Date of Placement                                     [  ] No    HOSPITAL MEDICATIONS:  MEDICATIONS  (STANDING):  albuterol/ipratropium for Nebulization 3 milliLiter(s) Nebulizer every 6 hours  atorvastatin 20 milliGRAM(s) Oral at bedtime  chlorhexidine 0.12% Liquid 15 milliLiter(s) Oral Mucosa every 12 hours  cholecalciferol 1000 Unit(s) Oral daily  enoxaparin Injectable 40 milliGRAM(s) SubCutaneous every 24 hours  epoetin krystyna-epbx (RETACRIT) Injectable 12296 Unit(s) SubCutaneous <User Schedule>  escitalopram 15 milliGRAM(s) Oral with breakfast  gabapentin Solution 150 milliGRAM(s) Oral every 12 hours  influenza  Vaccine (HIGH DOSE) 0.5 milliLiter(s) IntraMuscular once  lacosamide IVPB 200 milliGRAM(s) IV Intermittent every 12 hours  lamoTRIgine 50 milliGRAM(s) Oral two times a day  lanolin Ointment 1 Application(s) Topical daily  levETIRAcetam  IVPB 750 milliGRAM(s) IV Intermittent every 12 hours  lurasidone 20 milliGRAM(s) Oral at bedtime  nystatin Powder 1 Application(s) Topical every 8 hours  pantoprazole  Injectable 40 milliGRAM(s) IV Push daily  polyethylene glycol 3350 17 Gram(s) Oral daily  senna Syrup 10 milliLiter(s) Oral at bedtime  sevelamer carbonate Powder 1600 milliGRAM(s) Oral every 8 hours    MEDICATIONS  (PRN):  acetaminophen     Tablet .. 650 milliGRAM(s) Oral every 6 hours PRN Temp greater or equal to 38C (100.4F), Mild Pain (1 - 3)  aluminum hydroxide/magnesium hydroxide/simethicone Suspension 30 milliLiter(s) Oral every 4 hours PRN Dyspepsia  artificial tears (preservative free) Ophthalmic Solution 1 Drop(s) Both EYES every 6 hours PRN Dry Eyes  melatonin 3 milliGRAM(s) Oral at bedtime PRN Insomnia  ondansetron Injectable 4 milliGRAM(s) IV Push every 8 hours PRN Nausea and/or Vomiting      LABS:                        8.1    7.74  )-----------( 151      ( 15 Mar 2025 09:38 )             25.5     03-15    141  |  106  |  38[H]  ----------------------------<  85  3.5   |  20[L]  |  2.28[H]    Ca    9.9      15 Mar 2025 09:38  Phos  3.1     03-15  Mg     1.9     03-15        Urinalysis Basic - ( 15 Mar 2025 09:38 )    Color: x / Appearance: x / SG: x / pH: x  Gluc: 85 mg/dL / Ketone: x  / Bili: x / Urobili: x   Blood: x / Protein: x / Nitrite: x   Leuk Esterase: x / RBC: x / WBC x   Sq Epi: x / Non Sq Epi: x / Bacteria: x          CAPILLARY BLOOD GLUCOSE    MICROBIOLOGY:     RADIOLOGY:  [ ] Reviewed and interpreted by me    Mode: CPAP with PS  FiO2: 30  PEEP: 6  PS: 6  MAP: 9  PIP: 13   Patient is a 69y old  Male who presents with a chief complaint of Acute on chronic hypoxemic respiratory failure (16 Mar 2025 05:28)      Interval Events:  Did well overnight on PSV 6/6; tolerating TC during day                           Tolerating Feeds                           Completed Levaquin                           Following CR     REVIEW OF SYSTEMS:  [ ] Positive  [x ] All other systems negative  [ ] Unable to assess ROS because ________    Vital Signs Last 24 Hrs  T(C): 36.9 (03-16-25 @ 05:00), Max: 36.9 (03-15-25 @ 11:51)  T(F): 98.5 (03-16-25 @ 05:00), Max: 98.5 (03-16-25 @ 05:00)  HR: 72 (03-16-25 @ 06:28) (43 - 99)  BP: 142/87 (03-16-25 @ 05:00) (142/87 - 162/87)  RR: 18 (03-16-25 @ 05:00) (18 - 18)  SpO2: 100% (03-16-25 @ 06:28) (96% - 100%)    PHYSICAL EXAM:    HEENT:   [x ]Tracheostomy: # 7 cuffed portex   [x ]Pupils equal  [ ]No oral lesions  [ ]Abnormal    SKIN  [ ]No Rash  [x ] Abnormal: macerated skin noted on perineum, buttocks, and inner thighs; Lt occipital scalp injury   [ ] pressure    CARDIAC  [x ]Regular  [ ]Abnormal    PULMONARY  [x ]Bilateral Clear Breath Sounds  [ ]Normal Excursion  [ ]Abnormal    GI  [x ] peg c/d/i, binder in place   [x ] +BS		              [x ]Soft, nondistended, nontender	  [ ]Abnormal    MUSCULOSKELETAL                                   [x ]Bedbound                 [ ]Abnormal    [ ]Ambulatory/OOB to chair                           EXTREMITIES                                         [x ]Normal  [ ]Edema                           NEUROLOGIC  [x ] Normal, non focal  [ ] Focal findings:    PSYCHIATRIC  [x ]Alert and appropriate  [ ] Sedated	 [ ]Agitated    :  [place]: [ ] Yes, if yes: Date of Placement:                   [x  ] No    LINES: Central Lines [ ] Yes, if yes: Date of Placement                                     [ x ] No    HOSPITAL MEDICATIONS:  MEDICATIONS  (STANDING):  albuterol/ipratropium for Nebulization 3 milliLiter(s) Nebulizer every 6 hours  atorvastatin 20 milliGRAM(s) Oral at bedtime  chlorhexidine 0.12% Liquid 15 milliLiter(s) Oral Mucosa every 12 hours  cholecalciferol 1000 Unit(s) Oral daily  enoxaparin Injectable 40 milliGRAM(s) SubCutaneous every 24 hours  epoetin krystyna-epbx (RETACRIT) Injectable 45518 Unit(s) SubCutaneous <User Schedule>  escitalopram 15 milliGRAM(s) Oral with breakfast  gabapentin Solution 150 milliGRAM(s) Oral every 12 hours  influenza  Vaccine (HIGH DOSE) 0.5 milliLiter(s) IntraMuscular once  lacosamide IVPB 200 milliGRAM(s) IV Intermittent every 12 hours  lamoTRIgine 50 milliGRAM(s) Oral two times a day  lanolin Ointment 1 Application(s) Topical daily  levETIRAcetam  IVPB 750 milliGRAM(s) IV Intermittent every 12 hours  lurasidone 20 milliGRAM(s) Oral at bedtime  nystatin Powder 1 Application(s) Topical every 8 hours  pantoprazole  Injectable 40 milliGRAM(s) IV Push daily  polyethylene glycol 3350 17 Gram(s) Oral daily  senna Syrup 10 milliLiter(s) Oral at bedtime  sevelamer carbonate Powder 1600 milliGRAM(s) Oral every 8 hours    MEDICATIONS  (PRN):  acetaminophen     Tablet .. 650 milliGRAM(s) Oral every 6 hours PRN Temp greater or equal to 38C (100.4F), Mild Pain (1 - 3)  aluminum hydroxide/magnesium hydroxide/simethicone Suspension 30 milliLiter(s) Oral every 4 hours PRN Dyspepsia  artificial tears (preservative free) Ophthalmic Solution 1 Drop(s) Both EYES every 6 hours PRN Dry Eyes  melatonin 3 milliGRAM(s) Oral at bedtime PRN Insomnia  ondansetron Injectable 4 milliGRAM(s) IV Push every 8 hours PRN Nausea and/or Vomiting      LABS:                        8.1    7.74  )-----------( 151      ( 15 Mar 2025 09:38 )             25.5     03-15    141  |  106  |  38[H]  ----------------------------<  85  3.5   |  20[L]  |  2.28[H]    Ca    9.9      15 Mar 2025 09:38  Phos  3.1     03-15  Mg     1.9     03-15        Urinalysis Basic - ( 15 Mar 2025 09:38 )    Color: x / Appearance: x / SG: x / pH: x  Gluc: 85 mg/dL / Ketone: x  / Bili: x / Urobili: x   Blood: x / Protein: x / Nitrite: x   Leuk Esterase: x / RBC: x / WBC x   Sq Epi: x / Non Sq Epi: x / Bacteria: x          CAPILLARY BLOOD GLUCOSE    MICROBIOLOGY:     RADIOLOGY:  [ ] Reviewed and interpreted by me    Mode: CPAP with PS  FiO2: 30  PEEP: 6  PS: 6  MAP: 9  PIP: 13

## 2025-03-16 NOTE — PROGRESS NOTE ADULT - PROBLEM SELECTOR PLAN 8
- Kept NPO overnight for peg placement   - S/p Peg placement 3/14. EGD with normal findings. Patient cleared of meds only via peg.   - GI cleared for tube feeds to be resumed 3/15, monitor for tolerance   - Continue Senna and Miralax - S/p Peg placement 3/14. EGD with normal findings. Patient cleared of meds only via peg.   - GI cleared for tube feeds to be resumed 3/15 and tolerating well   - Continue Senna and Miralax

## 2025-03-16 NOTE — PROGRESS NOTE ADULT - SUBJECTIVE AND OBJECTIVE BOX
[facility]   [phone]  RENAL FOLLOW UP NOTE  --------------------------------------------------------------------------------  Chief Complaint:    24 hour events/subjective:    seen earlier   on trach collar     PAST HISTORY  --------------------------------------------------------------------------------  No significant changes to PMH, PSH, FHx, SHx, unless otherwise noted    ALLERGIES & MEDICATIONS  --------------------------------------------------------------------------------  Allergies    seasonal allergies (Unknown)  No Known Drug Allergies    Intolerances    latex (Rash)    Standing Inpatient Medications  albuterol/ipratropium for Nebulization 3 milliLiter(s) Nebulizer every 6 hours  atorvastatin 20 milliGRAM(s) Oral at bedtime  chlorhexidine 0.12% Liquid 15 milliLiter(s) Oral Mucosa every 12 hours  cholecalciferol 1000 Unit(s) Oral daily  enoxaparin Injectable 40 milliGRAM(s) SubCutaneous every 24 hours  epoetin krystyna-epbx (RETACRIT) Injectable 99865 Unit(s) SubCutaneous <User Schedule>  escitalopram 15 milliGRAM(s) Oral with breakfast  gabapentin Solution 150 milliGRAM(s) Oral every 12 hours  influenza  Vaccine (HIGH DOSE) 0.5 milliLiter(s) IntraMuscular once  lacosamide IVPB 200 milliGRAM(s) IV Intermittent every 12 hours  lamoTRIgine 50 milliGRAM(s) Oral two times a day  lanolin Ointment 1 Application(s) Topical daily  levETIRAcetam  IVPB 750 milliGRAM(s) IV Intermittent every 12 hours  lurasidone 20 milliGRAM(s) Oral at bedtime  nystatin Powder 1 Application(s) Topical every 8 hours  pantoprazole  Injectable 40 milliGRAM(s) IV Push daily  polyethylene glycol 3350 17 Gram(s) Oral daily  sevelamer carbonate Powder 1600 milliGRAM(s) Enteral Tube every 8 hours    PRN Inpatient Medications  acetaminophen     Tablet .. 650 milliGRAM(s) Oral every 6 hours PRN  aluminum hydroxide/magnesium hydroxide/simethicone Suspension 30 milliLiter(s) Oral every 4 hours PRN  artificial tears (preservative free) Ophthalmic Solution 1 Drop(s) Both EYES every 6 hours PRN  melatonin 3 milliGRAM(s) Oral at bedtime PRN  ondansetron Injectable 4 milliGRAM(s) IV Push every 8 hours PRN      REVIEW OF SYSTEMS  --------------------------------------------------------------------------------    Gen: denies  fevers/chills, or HA or dizziness   CVS: denies chest pain/palpitations  Resp: denies SOB/Cough  GI: Denies N/V/Abd pain  : Denies dysuria    VITALS/PHYSICAL EXAM  --------------------------------------------------------------------------------  T(C): 36.6 (03-16-25 @ 18:18), Max: 36.9 (03-16-25 @ 05:00)  HR: 91 (03-16-25 @ 20:35) (72 - 99)  BP: 147/75 (03-16-25 @ 18:18) (120/70 - 162/87)  RR: 18 (03-16-25 @ 18:18) (18 - 18)  SpO2: 100% (03-16-25 @ 20:35) (97% - 100%)  Wt(kg): --        03-15-25 @ 07:01  -  03-16-25 @ 07:00  --------------------------------------------------------  IN: 1090 mL / OUT: 800 mL / NET: 290 mL    03-16-25 @ 07:01  -  03-16-25 @ 22:11  --------------------------------------------------------  IN: 1150 mL / OUT: 0 mL / NET: 1150 mL      Physical Exam:  	    Gen: ill appearing male,  remains  on vent trach   	Pulm: decrease bs, +coarse bs    	CV: No JVD. RRR, S1S2; no rub  	Abd: +BS, soft, nondistended  	UE: Warm, no cyanosis  no clubbing,  no edema;  	LE: Warm, no cyanosis  no clubbing, no edema      LABS/STUDIES  --------------------------------------------------------------------------------              8.1    7.61  >-----------<  162      [03-16-25 @ 09:28]              25.4     139  |  105  |  32  ----------------------------<  108      [03-16-25 @ 09:29]  3.6   |  22  |  2.01        Ca     9.9     [03-16-25 @ 09:29]      Mg     1.8     [03-16-25 @ 09:29]      Phos  2.9     [03-16-25 @ 09:29]            Creatinine Trend:  SCr 2.01 [03-16 @ 09:29]  SCr 2.28 [03-15 @ 09:38]  SCr 2.67 [03-14 @ 07:14]  SCr 3.06 [03-13 @ 00:16]  SCr 2.81 [03-12 @ 00:26]          Ferritin 5943      [02-23-25 @ 06:15]  TSH 0.87      [01-25-25 @ 11:31]

## 2025-03-17 ENCOUNTER — RESULT REVIEW (OUTPATIENT)
Age: 70
End: 2025-03-17

## 2025-03-17 LAB
ANION GAP SERPL CALC-SCNC: 13 MMOL/L — SIGNIFICANT CHANGE UP (ref 5–17)
BASE EXCESS BLDA CALC-SCNC: -0.6 MMOL/L — SIGNIFICANT CHANGE UP (ref -2–3)
BUN SERPL-MCNC: 26 MG/DL — HIGH (ref 7–23)
CALCIUM SERPL-MCNC: 9.6 MG/DL — SIGNIFICANT CHANGE UP (ref 8.4–10.5)
CHLORIDE SERPL-SCNC: 106 MMOL/L — SIGNIFICANT CHANGE UP (ref 96–108)
CO2 BLDA-SCNC: 26 MMOL/L — HIGH (ref 19–24)
CO2 SERPL-SCNC: 23 MMOL/L — SIGNIFICANT CHANGE UP (ref 22–31)
CREAT SERPL-MCNC: 1.73 MG/DL — HIGH (ref 0.5–1.3)
EGFR: 43 ML/MIN/1.73M2 — LOW
EGFR: 43 ML/MIN/1.73M2 — LOW
GAS PNL BLDA: SIGNIFICANT CHANGE UP
GLUCOSE BLDC GLUCOMTR-MCNC: 104 MG/DL — HIGH (ref 70–99)
GLUCOSE BLDC GLUCOMTR-MCNC: 108 MG/DL — HIGH (ref 70–99)
GLUCOSE BLDC GLUCOMTR-MCNC: 133 MG/DL — HIGH (ref 70–99)
GLUCOSE BLDC GLUCOMTR-MCNC: 139 MG/DL — HIGH (ref 70–99)
GLUCOSE SERPL-MCNC: 95 MG/DL — SIGNIFICANT CHANGE UP (ref 70–99)
HCO3 BLDA-SCNC: 25 MMOL/L — SIGNIFICANT CHANGE UP (ref 21–28)
HCT VFR BLD CALC: 26.7 % — LOW (ref 39–50)
HGB BLD-MCNC: 8.5 G/DL — LOW (ref 13–17)
MAGNESIUM SERPL-MCNC: 2.2 MG/DL — SIGNIFICANT CHANGE UP (ref 1.6–2.6)
MCHC RBC-ENTMCNC: 31.5 PG — SIGNIFICANT CHANGE UP (ref 27–34)
MCHC RBC-ENTMCNC: 31.8 G/DL — LOW (ref 32–36)
MCV RBC AUTO: 98.9 FL — SIGNIFICANT CHANGE UP (ref 80–100)
NRBC BLD AUTO-RTO: 0 /100 WBCS — SIGNIFICANT CHANGE UP (ref 0–0)
PCO2 BLDA: 43 MMHG — SIGNIFICANT CHANGE UP (ref 35–48)
PH BLDA: 7.37 — SIGNIFICANT CHANGE UP (ref 7.35–7.45)
PHOSPHATE SERPL-MCNC: 2.8 MG/DL — SIGNIFICANT CHANGE UP (ref 2.5–4.5)
PLATELET # BLD AUTO: 165 K/UL — SIGNIFICANT CHANGE UP (ref 150–400)
PO2 BLDA: 140 MMHG — HIGH (ref 83–108)
POTASSIUM SERPL-MCNC: 3.5 MMOL/L — SIGNIFICANT CHANGE UP (ref 3.5–5.3)
POTASSIUM SERPL-SCNC: 3.5 MMOL/L — SIGNIFICANT CHANGE UP (ref 3.5–5.3)
RBC # BLD: 2.7 M/UL — LOW (ref 4.2–5.8)
RBC # FLD: 16.4 % — HIGH (ref 10.3–14.5)
SAO2 % BLDA: 99 % — HIGH (ref 94–98)
SODIUM SERPL-SCNC: 142 MMOL/L — SIGNIFICANT CHANGE UP (ref 135–145)
WBC # BLD: 7.56 K/UL — SIGNIFICANT CHANGE UP (ref 3.8–10.5)
WBC # FLD AUTO: 7.56 K/UL — SIGNIFICANT CHANGE UP (ref 3.8–10.5)

## 2025-03-17 PROCEDURE — 93308 TTE F-UP OR LMTD: CPT | Mod: 26

## 2025-03-17 RX ADMIN — IPRATROPIUM BROMIDE AND ALBUTEROL SULFATE 3 MILLILITER(S): .5; 2.5 SOLUTION RESPIRATORY (INHALATION) at 17:32

## 2025-03-17 RX ADMIN — IPRATROPIUM BROMIDE AND ALBUTEROL SULFATE 3 MILLILITER(S): .5; 2.5 SOLUTION RESPIRATORY (INHALATION) at 11:40

## 2025-03-17 RX ADMIN — ESCITALOPRAM OXALATE 15 MILLIGRAM(S): 20 TABLET ORAL at 07:53

## 2025-03-17 RX ADMIN — NYSTATIN 1 APPLICATION(S): 100000 CREAM TOPICAL at 21:46

## 2025-03-17 RX ADMIN — LEVETIRACETAM 400 MILLIGRAM(S): 10 INJECTION, SOLUTION INTRAVENOUS at 05:21

## 2025-03-17 RX ADMIN — Medication 15 MILLILITER(S): at 05:18

## 2025-03-17 RX ADMIN — LURASIDONE HYDROCHLORIDE 20 MILLIGRAM(S): 120 TABLET, FILM COATED ORAL at 21:46

## 2025-03-17 RX ADMIN — SEVELAMER HYDROCHLORIDE 1600 MILLIGRAM(S): 800 TABLET ORAL at 21:46

## 2025-03-17 RX ADMIN — ENOXAPARIN SODIUM 40 MILLIGRAM(S): 100 INJECTION SUBCUTANEOUS at 18:11

## 2025-03-17 RX ADMIN — NYSTATIN 1 APPLICATION(S): 100000 CREAM TOPICAL at 05:27

## 2025-03-17 RX ADMIN — IPRATROPIUM BROMIDE AND ALBUTEROL SULFATE 3 MILLILITER(S): .5; 2.5 SOLUTION RESPIRATORY (INHALATION) at 23:11

## 2025-03-17 RX ADMIN — SEVELAMER HYDROCHLORIDE 1600 MILLIGRAM(S): 800 TABLET ORAL at 05:19

## 2025-03-17 RX ADMIN — SEVELAMER HYDROCHLORIDE 1600 MILLIGRAM(S): 800 TABLET ORAL at 13:57

## 2025-03-17 RX ADMIN — Medication 40 MILLIGRAM(S): at 12:51

## 2025-03-17 RX ADMIN — Medication 15 MILLILITER(S): at 18:11

## 2025-03-17 RX ADMIN — LEVETIRACETAM 400 MILLIGRAM(S): 10 INJECTION, SOLUTION INTRAVENOUS at 18:11

## 2025-03-17 RX ADMIN — LACOSAMIDE 140 MILLIGRAM(S): 150 TABLET, FILM COATED ORAL at 05:21

## 2025-03-17 RX ADMIN — GABAPENTIN 150 MILLIGRAM(S): 400 CAPSULE ORAL at 18:12

## 2025-03-17 RX ADMIN — LAMOTRIGINE 50 MILLIGRAM(S): 150 TABLET ORAL at 05:20

## 2025-03-17 RX ADMIN — ATORVASTATIN CALCIUM 20 MILLIGRAM(S): 80 TABLET, FILM COATED ORAL at 21:46

## 2025-03-17 RX ADMIN — LAMOTRIGINE 50 MILLIGRAM(S): 150 TABLET ORAL at 18:11

## 2025-03-17 RX ADMIN — POLYETHYLENE GLYCOL 3350 17 GRAM(S): 17 POWDER, FOR SOLUTION ORAL at 13:00

## 2025-03-17 RX ADMIN — IPRATROPIUM BROMIDE AND ALBUTEROL SULFATE 3 MILLILITER(S): .5; 2.5 SOLUTION RESPIRATORY (INHALATION) at 06:07

## 2025-03-17 RX ADMIN — GABAPENTIN 150 MILLIGRAM(S): 400 CAPSULE ORAL at 05:19

## 2025-03-17 RX ADMIN — Medication 1000 UNIT(S): at 12:51

## 2025-03-17 RX ADMIN — Medication 1 APPLICATION(S): at 13:00

## 2025-03-17 RX ADMIN — LACOSAMIDE 140 MILLIGRAM(S): 150 TABLET, FILM COATED ORAL at 18:11

## 2025-03-17 RX ADMIN — NYSTATIN 1 APPLICATION(S): 100000 CREAM TOPICAL at 13:57

## 2025-03-17 NOTE — ADVANCED PRACTICE NURSE CONSULT - ASSESSMENT
The pt was encountered in the RCU- Mr Gr was sleeping but arousable and able to communicate by mouthing his words. He is on a [employer] SPort support surface and staff are turning him using the maddy-form positioner. His head is being off-loaded with the maddy-form positioner as well.  He is being followed by nutrition and meets the criteria for severe protein calorie malnutrition, He receives enteral feeds.  His heels are being off-loaded with complete cair boots,  Upon assessment, he presents with a linear wound on his occiput, it measures 1cmx 4cmx 0cm and presents as a dry stable scab, raised above the surface of the skin. will classify as a wound- etiology is unclear.  Education was provided re: skin condition , tx plan and PI Prevention

## 2025-03-17 NOTE — SPEAKING VALVE EVALUATION - DIAGNOSTIC IMPRESSIONS
Patient seen for [place] speaking valve evaluation. Patient maintained on 40% FIO2 via trach collar. Low profile valve placed on the hub of the trach. Hoarse but efficient vocal quality. Patient tolerated valve and vital signs were stable throughout exam. No signs of respiratory distress. No increased work of breathing, subjective complaints, and no signs of air trapping. Pt left sitting upright in bed with speaking valve in place, accompanied by family. Reviewed safety instructions for speaking valve use with RN and family, sign placed above bed. Patient seen for Pass[place] speaking valve evaluation. Patient maintained on 40% FIO2 via trach collar. Low profile valve placed on the hub of the trach. Hoarse but efficient vocal quality. Patient tolerated valve and vital signs were stable throughout exam. No signs of respiratory distress. No increased work of breathing, subjective complaints, and no signs of air trapping. Pt left sitting upright in bed with speaking valve in place, accompanied by family. Reviewed safety instructions for speaking valve use with RN and family, sign placed above bed. NEHEMIAS Gay and CAROL Reddy made aware and in agreement. Patient seen for Pass[place] speaking valve evaluation. Patient maintained on 40% FIO2 via trach collar. Low profile valve placed on the hub of the trach. Hoarse but efficient vocal quality. Patient tolerated valve and vital signs were stable throughout exam. No signs of respiratory distress. No increased work of breathing, subjective complaints, and no signs of air trapping. Pt left sitting upright in bed with speaking valve in place, accompanied by family. Reviewed safety instructions for speaking valve use with RN and family, sign placed above bed. NEHEMIAS Gay and CAROL Reddy made aware and in agreement with recommendations. Plan for FEES tomorrow to instrumentally re-assess swallow function.

## 2025-03-17 NOTE — PROGRESS NOTE ADULT - ASSESSMENT
Patient is a 69 year old male with PMH of HTN, HLD, VAZQUEZ (on CPAP at bedtime, NC 2 liters during the day), CKD3, epilepsy, schizophrenia, developmental delay, metastatic testicular cancer (s/p orchiectomy, actively on chemotherapy, last dose of etoposide/cisplatin on 6/2024) presenting with worsening shortness of breath. Patient was recently hospitalized at [facility] from January 27th 2025 to February 6th 2025 for seizure and influenza virus infection, which required intubation. He was sent to rehab (originally from home) from hospital and now returns due to sudden onset shortness of breath and worsening oxygenation. Of note, he tested positive for COVID-19 at facility on 2/13/25 and was not put on any COVID-19 treatment at the time, only guaifenesin and scopolamine patch. Patient was placed on non-rebreather and sent to the hospital on 2/16/25. Noted initial discussion with patient/family and they decided to hold off on remdesivir given LFTs and SHAGUFTA.     Acute on chronic hypoxic respiratory failure  COVID-19 pneumonia, superimposed bacterial pneumonia  Acute on chronic HFpEF  Klebsiella UTI, treated   SHAGUFTA on CKD  Severe sepsis, hypotension, SHAGUFTA, likely due to aspiration pneumonia, treated  Now with fever with copious secretions post extubation - Pseudomonas pneumonia   Pseudomonas UTI  - CT chest with extensive b/l ggo c/w COVID pneumonia; tracheomalacia   - MRSA PCR screen negative, s/p vancomycin   - 2/18 worsening resp status, requiring AVAPS, started on remdesivir   - 2/22 completed remdesivir x5d course   - 2/23 s/p RRT for inc WOB, s/p intubation, suspected aspiration   - 2/24 intubated, on sedation, pressor support, SHAGUFTA, WBC wnl   - 2/25 afebrile, off pressor, WBC wnl, Cr stable  - 2/26 CT chest with improved upper lobe ggo, new/worsening confluent areas of consolidation in mid-lower lungs   - 2/26 completed dexamethasone x10d  - 2/28 completed zosyn   - 3/4 s/p extubation, now on NC, copious secretions requiring frequent suctioning   - 3/5 started on erythromycin for conjunctivitis, culture grew Staph epi - S to erythromycin   - 3/7 febrile, WBC noted, secretions less but still a lot, worsening pulm findings, likely aspirating, started on zosyn    - s/p re-intubation and bronchoscopy with L mainstem mucous plug, thick secretions -send for culture    - 3/7 Scx noted with Pseudomonas aeruginosa -- now R to zosyn (h/o pansensitive Pseudomonas in Scx in 1/2025)   - 3/8 BAL culture with mod Pseudomonas   -  3/9 pm switched from zosyn to levofloxacin based on sensitivities   - 3/9 noted with cloudy urine, UA with pyuria -- Ucx growing Pseudomonas also (sensitivities noted)   - 3/10 WBC normalized, Hb low, temps wnl, remains intubated   - 3/12 s/p tracheostomy   - 3/14 WBC noted likely reactive--normalized; s/p PEG placement   - 3/15 completed levofloxacin x7d course     Recommendations:  Continue off antibiotics   Nephrology following, monitor Cr  Monitor temps/WBC  Supportive care  Aspiration precautions  Continue rest of care per primary team       Greyson Mart M.D.  [facility]  Available on [employer] TEAMS - *PREFERRED*  [phone]  After 5pm on weekdays and all day on weekends - please call [phone]     Thank you for consulting us and involving us in the management of this patients case. In addition to reviewing history, imaging, documents, labs, microbiology, took into account antibiotic stewardship, local antibiogram and infection control strategies and potential transmission issues.

## 2025-03-17 NOTE — SPEECH LANGUAGE PATHOLOGY EVALUATION - COMMENTS
In the ER, vital signs significant for T-max 101.9F, HR 84-90. WBC 6.8. Hemoglobin 9.1. Platelet 87. AST 70. ALT 48. Cr 3.27. BUN 45. pH 7.30, pCO2 48. UA negative for infection. Patient given vancomycin, Zosyn, albuterol, and  cc bolus. Patient admitted to the general medicine service for further care. Pulmonology consulted for acute respiratory failure. Nephrology following for worsening creatinine. 2/18 pt w/ increased respiratory requirements xfer from 6L NC to AVAPS. Started on remdesivir. 2/19 pt failed RN administered Dysphagia screen; Klebsiella in urine; transition to HFNC; RN reporting pt w/ difficulty swallowing medication and febrile overnight. Cardiology consulted for new onset Afib RvR. 2/20 HFNC lowered to 50L/60% w/ sats in mid 90s.     Imaging:   CT chest with b/l GGO, new since 1/30/25. Likely COVID PNA +/- superimposed bacterial PNA; tracheomalacia.   MRI brain now with 5.4 mm enhancing lesion in the LEFT middle cerebellar peduncle with associated edema suspicious for metastases.    Swallow hx: Pt known to this service w/ initial bedside swallow evaluation completed January 2023. Most recent instrumental evaluation was a FEES completed 1/30/25 revealing an clare-pharyngeal dysphagia. Pt w/ laryngeal penetration before the swallow for mildly thick liquids via straw, laryngeal penetration during the swallow for moderately thick liquids via cup and consecutive straw sips. Residue remains which is cleared with cued cough/re-swallow. Airway protection improved with small, single sips via straw. Pt not a candidate for strategies 2/2 impulsivity and limited carryover. Conservative diet of puree and moderately thick liquids recommended given multiple intubation and c/f aspiration PNA. Imaging:   CT chest with b/l GGO, new since 1/30/25. Likely COVID PNA +/- superimposed bacterial PNA; tracheomalacia.   MRI brain now with 5.4 mm enhancing lesion in the LEFT middle cerebellar peduncle with associated edema suspicious for metastases. Airway placement 3/8, trach 3/12 (remains intubated and sedated). Therapeutic aspiration of secretions of RLL. No bleeding noted. PEG placement 3/14. 3/17: On trach collar ON 40%. PMV evaluation       Swallow hx: Pt known to this service w/ initial bedside swallow evaluation completed January 2023. Most recent instrumental evaluation was a FEES completed 1/30/25 revealing an clare-pharyngeal dysphagia. Pt w/ laryngeal penetration before the swallow for mildly thick liquids via straw, laryngeal penetration during the swallow for moderately thick liquids via cup and consecutive straw sips. Residue remains which is cleared with cued cough/re-swallow. Airway protection improved with small, single sips via straw. Pt not a candidate for strategies 2/2 impulsivity and limited carryover. Conservative diet of puree and moderately thick liquids recommended given multiple intubation and c/f aspiration PNA. Imaging:   CT chest with b/l GGO, new since 1/30/25. Likely COVID PNA +/- superimposed bacterial PNA; tracheomalacia.   MRI brain now with 5.4 mm enhancing lesion in the LEFT middle cerebellar peduncle with associated edema suspicious for metastases. Airway placement 3/8, trach 3/12 (remains intubated and sedated). Therapeutic aspiration of secretions of RLL. No bleeding noted. PEG placement 3/14. 3/17: On trach collar ON 40%. PMV evaluation       Swallow hx: Pt known to this service w/ initial bedside swallow evaluation completed January 2023 and FEES completed during current hospital course. FEES (2/21/25) revealing an clare-pharyngeal dysphagia. Pt w/ premature spillage with mildly thick liquids and subsequent aspiration during the swallow. Case discussed with DARIAN Horton w/ recommendations for patient to be NPO, with non-oral nutrition/hydration/medications given change in respiratory status.

## 2025-03-17 NOTE — ADVANCED PRACTICE NURSE CONSULT - RECOMMEDATIONS
1. Occiput: continue to monitor, apply cavilon daily, continue off-loading the head with the maddy-form positioner  2. continue with T&P  3. continue with boots  4. seat cushion when oob to chair  5. nutrition support as pt condition allows  Tx plan discussed with pt/RN

## 2025-03-17 NOTE — PROGRESS NOTE ADULT - PROBLEM SELECTOR PLAN 9
SHAGUFTA on CKD stage 3 suspected in setting of new infection Covid 19   - CR peaked to 4.34 on 3/3, now improving   - hyperphosphatemia, continue Renvela 1600 mg tid  - trend hb add Retacrit [id number] U tiw sq  for trach

## 2025-03-17 NOTE — PROGRESS NOTE ADULT - PROBLEM SELECTOR PLAN 8
- S/p Peg placement 3/14. EGD with normal findings. Patient cleared of meds only via peg.   - GI cleared for tube feeds to be resumed 3/15 and tolerating well   - Continue Senna and Miralax

## 2025-03-17 NOTE — SPEAKING VALVE EVALUATION - SPECIFY REASON(S)
to subjectively assess speech and language: PMV evaluation to assess candidacy for speaking valve use

## 2025-03-17 NOTE — SPEAKING VALVE EVALUATION - SLP GENERAL OBSERVATIONS
Pt was encounter sitting upright in bed, on Fi)2 40% via trach collar. Pt was A&Ox4. Oral care provided. Suctioning from trach provided prior to speaking valve trial, with intermittent periods of suctioning needed from oral cavity throughout evaluation. Pt was pleasant and cooperated through trials. Pt was encounter sitting upright in bed, on FiO2 40% via trach collar. Pt was A&Ox4. Oral care provided. Suctioning from trach provided prior to speaking valve trial, with intermittent periods of suctioning needed from oral cavity throughout evaluation. Pt was pleasant and cooperated throughout trials.

## 2025-03-17 NOTE — PROGRESS NOTE ADULT - SUBJECTIVE AND OBJECTIVE BOX
DATE OF SERVICE: 03-17-25 @ 13:10    Patient is a 69y old  Male who presents with a chief complaint of Acute on chronic hypoxemic respiratory failure (17 Mar 2025 11:33)      INTERVAL HISTORY: feels okay, OOB in chair     REVIEW OF SYSTEMS:  CONSTITUTIONAL: No weakness  EYES/ENT: No visual changes;  No throat pain   NECK: No pain or stiffness  RESPIRATORY: No cough, wheezing; No shortness of breath  CARDIOVASCULAR: No chest pain or palpitations  GASTROINTESTINAL: No abdominal  pain. No nausea, vomiting, or hematemesis  GENITOURINARY: No dysuria, frequency or hematuria  NEUROLOGICAL: No stroke like symptoms  SKIN: No rashes      	  MEDICATIONS:        PHYSICAL EXAM:  T(C): 37.2 (03-17-25 @ 11:15), Max: 37.2 (03-17-25 @ 11:15)  HR: 94 (03-17-25 @ 11:40) (80 - 98)  BP: 145/78 (03-17-25 @ 11:15) (129/73 - 149/74)  RR: 18 (03-17-25 @ 11:15) (18 - 18)  SpO2: 100% (03-17-25 @ 11:40) (98% - 100%)  Wt(kg): --  I&O's Summary    16 Mar 2025 07:01  -  17 Mar 2025 07:00  --------------------------------------------------------  IN: 1660 mL / OUT: 400 mL / NET: 1260 mL          Appearance: In no distress	  HEENT:    PERRL, EOMI	  Cardiovascular:  S1 S2, No JVD  Respiratory: Lungs clear to auscultation	  Gastrointestinal:  Soft, Non-tender, + BS	  Vascularature:  No edema of LE  Psychiatric: Appropriate affect   Neuro: no acute focal deficits                               8.5    7.56  )-----------( 165      ( 17 Mar 2025 06:35 )             26.7     03-17    142  |  106  |  26[H]  ----------------------------<  95  3.5   |  23  |  1.73[H]    Ca    9.6      17 Mar 2025 06:35  Phos  2.8     03-17  Mg     2.2     03-17          Labs personally reviewed      ASSESSMENT/PLAN: 	    69 year old male with a past medical history of hypertension, hyperlipemia VAZQUEZ (on CPAP at bedtime, NC 2 liters during the day), CKD stage 3, epilepsy, schizophrenia, developmental delay, metastatic testicular cancer (s/p orchiectomy, actively on chemotherapy, last dose of etoposide/cisplatin on 6/2024), presenting with acute on chronic hypoxemic respiratory failure, secondary to (a) COVID-19 infection, (b) superimposed pneumonia after recent influenza infection, and/or (c) fluid overload.     Problem/Plan - 1:  ·  Problem: Acute on chronic respiratory failure with hypoxia.   ·  Plan: Likely 2/2 PNA, HF  - Appreciate pulmonology.  - s/p trach 3/12  - Transferred to [facility]    Problem/Plan - 2:  ·  Problem: SHAGUFTA (acute kidney injury).   ·  Plan: Has a history of CKD stage 3, Cr 2.38 at baseline.    - Nephrology following  - BUN now improved following de-escalation of diuretics    Problem/Plan - 3:  ·  Problem: Chronic heart failure with preserved ejection fraction.   ·  Plan: TTE done here 1/17/25 technically difficult, but LV systolic fxn appears nl without any regional wall motion abnormalities  - Euvolemic, repeat BNP. On 2/23 2300  - TTE 3/17 1. No obvious Left atrial thrombus; unable to evaluate left atrial appendage on TTE 2. Compared to the transthoracic echocardiogram performed on 2/23/2025, LA difficult to compare.    Problem/Plan - 4:  ·  Problem: New onset Afib RVR (on tele, confirmed with EKG 2/19)   ·  Plan:  - Rec Metoprolol 25mg BID  - Rec Eliquis 5mg BID      Problem/Plan - 5:  ·  Problem: HTN    ·  Plan: Resolved    Problem/Plan - 6:  ·  Problem: Cardiac Risk Stratification   ·  Plan: Patient is high risk given pressor dependent for mod risk PEG placement. No contraindication to proceed.   - Tele with stable HR SR with PACs  - Patient with preserved EF, not currently in decompensated HF  - No significant valvular dysfunction          Iolani Behrbom, AG-DARIAN Andrew,  [facility]  Cardiovascular Medicine  [address], [address]  Available through call or text on [employer] TE[employer]  Office: [phone]   DATE OF SERVICE: 03-17-25 @ 13:10    Patient is a 69y old  Male who presents with a chief complaint of Acute on chronic hypoxemic respiratory failure (17 Mar 2025 11:33)      INTERVAL HISTORY: feels okay, OOB in chair     REVIEW OF SYSTEMS:  CONSTITUTIONAL: No weakness  EYES/ENT: No visual changes;  No throat pain   NECK: No pain or stiffness  RESPIRATORY: No cough, wheezing; No shortness of breath  CARDIOVASCULAR: No chest pain or palpitations  GASTROINTESTINAL: No abdominal  pain. No nausea, vomiting, or hematemesis  GENITOURINARY: No dysuria, frequency or hematuria  NEUROLOGICAL: No stroke like symptoms  SKIN: No rashes      	  MEDICATIONS:        PHYSICAL EXAM:  T(C): 37.2 (03-17-25 @ 11:15), Max: 37.2 (03-17-25 @ 11:15)  HR: 94 (03-17-25 @ 11:40) (80 - 98)  BP: 145/78 (03-17-25 @ 11:15) (129/73 - 149/74)  RR: 18 (03-17-25 @ 11:15) (18 - 18)  SpO2: 100% (03-17-25 @ 11:40) (98% - 100%)  Wt(kg): --  I&O's Summary    16 Mar 2025 07:01  -  17 Mar 2025 07:00  --------------------------------------------------------  IN: 1660 mL / OUT: 400 mL / NET: 1260 mL          Appearance: In no distress	  HEENT:    PERRL, EOMI	  Cardiovascular:  S1 S2, No JVD  Respiratory: Lungs clear to auscultation	  Gastrointestinal:  Soft, Non-tender, + BS	  Vascularature:  No edema of LE  Psychiatric: Appropriate affect   Neuro: no acute focal deficits                               8.5    7.56  )-----------( 165      ( 17 Mar 2025 06:35 )             26.7     03-17    142  |  106  |  26[H]  ----------------------------<  95  3.5   |  23  |  1.73[H]    Ca    9.6      17 Mar 2025 06:35  Phos  2.8     03-17  Mg     2.2     03-17          Labs personally reviewed      ASSESSMENT/PLAN: 	    69 year old male with a past medical history of hypertension, hyperlipemia VAZQUEZ (on CPAP at bedtime, NC 2 liters during the day), CKD stage 3, epilepsy, schizophrenia, developmental delay, metastatic testicular cancer (s/p orchiectomy, actively on chemotherapy, last dose of etoposide/cisplatin on 6/2024), presenting with acute on chronic hypoxemic respiratory failure, secondary to (a) COVID-19 infection, (b) superimposed pneumonia after recent influenza infection, and/or (c) fluid overload.     Problem/Plan - 1:  ·  Problem: Acute on chronic respiratory failure with hypoxia.   ·  Plan: Likely 2/2 PNA, HF  - Appreciate pulmonology.  - s/p trach 3/12  - Transferred to [facility]    Problem/Plan - 2:  ·  Problem: SHAGUFTA (acute kidney injury).   ·  Plan: Has a history of CKD stage 3, Cr 2.38 at baseline.    - Nephrology following  - BUN now improved following de-escalation of diuretics    Problem/Plan - 3:  ·  Problem: Chronic heart failure with preserved ejection fraction.   ·  Plan: TTE done here 1/17/25 technically difficult, but LV systolic fxn appears nl without any regional wall motion abnormalities  - Euvolemic, repeat BNP. On 2/23 2300  - TTE 3/17 1. No obvious Left atrial thrombus; unable to evaluate left atrial appendage on TTE 2. Compared to the transthoracic echocardiogram performed on 2/23/2025, LA difficult to compare.    Problem/Plan - 4:  ·  Problem: New onset Afib RVR (on tele, confirmed with EKG 2/19)   ·  Plan:  - Rec Metoprolol 25mg BID  - Rec Eliquis 5mg BID      Problem/Plan - 5:  ·  Problem: HTN    ·  Plan: Resolved        Discussed with primary team in R[facility]      Iolani Behrbom, AG-NP Omid Javdan, DO [facility]  Cardiovascular Medicine  [address], [address]  Available through call or text on [employer] TEAM[employer]  Office: [phone]

## 2025-03-17 NOTE — PROGRESS NOTE ADULT - SUBJECTIVE AND OBJECTIVE BOX
Patient is a 69y old  Male who presents with a chief complaint of Acute on chronic hypoxemic respiratory failure (17 Mar 2025 05:10)      Interval Events:    REVIEW OF SYSTEMS:  [ ] Positive  [ ] All other systems negative  [ ] Unable to assess ROS because ________    Vital Signs Last 24 Hrs  T(C): 36.8 (03-17-25 @ 05:43), Max: 36.8 (03-16-25 @ 13:10)  T(F): 98.2 (03-17-25 @ 05:43), Max: 98.3 (03-16-25 @ 13:10)  HR: 84 (03-17-25 @ 06:35) (80 - 98)  BP: 149/74 (03-17-25 @ 05:43) (120/70 - 149/74)  RR: 18 (03-17-25 @ 05:43) (18 - 18)  SpO2: 100% (03-17-25 @ 06:35) (98% - 100%)    PHYSICAL EXAM:  HEENT:   [ ]Tracheostomy:  [ ]Pupils equal  [ ]No oral lesions  [ ]Abnormal    SKIN  [ ]No Rash  [ ] Abnormal  [ ] pressure    CARDIAC  [ ]Regular  [ ]Abnormal    PULMONARY  [ ]Bilateral Clear Breath Sounds  [ ]Normal Excursion  [ ]Abnormal    GI  [ ]PEG      [ ] +BS		              [ ]Soft, nondistended, nontender	  [ ]Abnormal    MUSCULOSKELETAL                                   [ ]Bedbound                 [ ]Abnormal    [ ]Ambulatory/OOB to chair                           EXTREMITIES                                         [ ]Normal  [ ]Edema                           NEUROLOGIC  [ ] Normal, non focal  [ ] Focal findings:    PSYCHIATRIC  [ ]Alert and appropriate  [ ] Sedated	 [ ]Agitated    :  [place]: [ ] Yes, if yes: Date of Placement:                   [  ] No    LINES: Central Lines [ ] Yes, if yes: Date of Placement                                     [  ] No    HOSPITAL MEDICATIONS:  MEDICATIONS  (STANDING):  albuterol/ipratropium for Nebulization 3 milliLiter(s) Nebulizer every 6 hours  atorvastatin 20 milliGRAM(s) Oral at bedtime  chlorhexidine 0.12% Liquid 15 milliLiter(s) Oral Mucosa every 12 hours  cholecalciferol 1000 Unit(s) Oral daily  enoxaparin Injectable 40 milliGRAM(s) SubCutaneous every 24 hours  epoetin krystyna-epbx (RETACRIT) Injectable 46661 Unit(s) SubCutaneous <User Schedule>  escitalopram 15 milliGRAM(s) Oral with breakfast  gabapentin Solution 150 milliGRAM(s) Oral every 12 hours  influenza  Vaccine (HIGH DOSE) 0.5 milliLiter(s) IntraMuscular once  lacosamide IVPB 200 milliGRAM(s) IV Intermittent every 12 hours  lamoTRIgine 50 milliGRAM(s) Oral two times a day  lanolin Ointment 1 Application(s) Topical daily  levETIRAcetam  IVPB 750 milliGRAM(s) IV Intermittent every 12 hours  lurasidone 20 milliGRAM(s) Oral at bedtime  nystatin Powder 1 Application(s) Topical every 8 hours  pantoprazole  Injectable 40 milliGRAM(s) IV Push daily  polyethylene glycol 3350 17 Gram(s) Oral daily  sevelamer carbonate Powder 1600 milliGRAM(s) Enteral Tube every 8 hours    MEDICATIONS  (PRN):  acetaminophen     Tablet .. 650 milliGRAM(s) Oral every 6 hours PRN Temp greater or equal to 38C (100.4F), Mild Pain (1 - 3)  aluminum hydroxide/magnesium hydroxide/simethicone Suspension 30 milliLiter(s) Oral every 4 hours PRN Dyspepsia  artificial tears (preservative free) Ophthalmic Solution 1 Drop(s) Both EYES every 6 hours PRN Dry Eyes  melatonin 3 milliGRAM(s) Oral at bedtime PRN Insomnia  ondansetron Injectable 4 milliGRAM(s) IV Push every 8 hours PRN Nausea and/or Vomiting      LABS:                        8.5    7.56  )-----------( 165      ( 17 Mar 2025 06:35 )             26.7     03-17    142  |  106  |  26[H]  ----------------------------<  95  3.5   |  23  |  1.73[H]    Ca    9.6      17 Mar 2025 06:35  Phos  2.8     03-17  Mg     2.2     03-17        Urinalysis Basic - ( 17 Mar 2025 06:35 )    Color: x / Appearance: x / SG: x / pH: x  Gluc: 95 mg/dL / Ketone: x  / Bili: x / Urobili: x   Blood: x / Protein: x / Nitrite: x   Leuk Esterase: x / RBC: x / WBC x   Sq Epi: x / Non Sq Epi: x / Bacteria: x          CAPILLARY BLOOD GLUCOSE    MICROBIOLOGY:     RADIOLOGY:  [ ] Reviewed and interpreted by me    Mode: standby   Patient is a 69y old  Male who presents with a chief complaint of Acute on chronic hypoxemic respiratory failure (17 Mar 2025 05:10)      Interval Events:    REVIEW OF SYSTEMS:  [ ] Positive  [X] All other systems negative  [ ] Unable to assess ROS because ________    Vital Signs Last 24 Hrs  T(C): 36.8 (03-17-25 @ 05:43), Max: 36.8 (03-16-25 @ 13:10)  T(F): 98.2 (03-17-25 @ 05:43), Max: 98.3 (03-16-25 @ 13:10)  HR: 84 (03-17-25 @ 06:35) (80 - 98)  BP: 149/74 (03-17-25 @ 05:43) (120/70 - 149/74)  RR: 18 (03-17-25 @ 05:43) (18 - 18)  SpO2: 100% (03-17-25 @ 06:35) (98% - 100%)    PHYSICAL EXAM:  HEENT:   [X]Tracheostomy: #7 cuffed portex   [X]Pupils equal  [ ]No oral lesions  [ ]Abnormal    SKIN  [ ]No Rash  [X] Abnormal -- macerated skin noted on perineum, buttocks, and inner thighs; Lt occipital scalp injury   [ ] pressure    CARDIAC  [X]Regular  [ ]Abnormal    PULMONARY  [X]Bilateral Clear Breath Sounds  [ ]Normal Excursion  [ ]Abnormal    GI  [X]PEG      [X] +BS		              [X]Soft, nondistended, nontender	  [ ]Abnormal    MUSCULOSKELETAL                                   [X]Bedbound                 [ ]Abnormal    [ ]Ambulatory/OOB to chair                           EXTREMITIES                                         [X]Normal  [ ]Edema                           NEUROLOGIC  [X] Normal, non focal  [ ] Focal findings:    PSYCHIATRIC  [X]Alert and appropriate  [ ] Sedated	 [ ]Agitated    :  [place]: [ ] Yes, if yes: Date of Placement:                   [X] No    LINES: Central Lines [ ] Yes, if yes: Date of Placement                                     [X] No    HOSPITAL MEDICATIONS:  MEDICATIONS  (STANDING):  albuterol/ipratropium for Nebulization 3 milliLiter(s) Nebulizer every 6 hours  atorvastatin 20 milliGRAM(s) Oral at bedtime  chlorhexidine 0.12% Liquid 15 milliLiter(s) Oral Mucosa every 12 hours  cholecalciferol 1000 Unit(s) Oral daily  enoxaparin Injectable 40 milliGRAM(s) SubCutaneous every 24 hours  epoetin krystyna-epbx (RETACRIT) Injectable 37196 Unit(s) SubCutaneous <User Schedule>  escitalopram 15 milliGRAM(s) Oral with breakfast  gabapentin Solution 150 milliGRAM(s) Oral every 12 hours  influenza  Vaccine (HIGH DOSE) 0.5 milliLiter(s) IntraMuscular once  lacosamide IVPB 200 milliGRAM(s) IV Intermittent every 12 hours  lamoTRIgine 50 milliGRAM(s) Oral two times a day  lanolin Ointment 1 Application(s) Topical daily  levETIRAcetam  IVPB 750 milliGRAM(s) IV Intermittent every 12 hours  lurasidone 20 milliGRAM(s) Oral at bedtime  nystatin Powder 1 Application(s) Topical every 8 hours  pantoprazole  Injectable 40 milliGRAM(s) IV Push daily  polyethylene glycol 3350 17 Gram(s) Oral daily  sevelamer carbonate Powder 1600 milliGRAM(s) Enteral Tube every 8 hours    MEDICATIONS  (PRN):  acetaminophen     Tablet .. 650 milliGRAM(s) Oral every 6 hours PRN Temp greater or equal to 38C (100.4F), Mild Pain (1 - 3)  aluminum hydroxide/magnesium hydroxide/simethicone Suspension 30 milliLiter(s) Oral every 4 hours PRN Dyspepsia  artificial tears (preservative free) Ophthalmic Solution 1 Drop(s) Both EYES every 6 hours PRN Dry Eyes  melatonin 3 milliGRAM(s) Oral at bedtime PRN Insomnia  ondansetron Injectable 4 milliGRAM(s) IV Push every 8 hours PRN Nausea and/or Vomiting      LABS:                        8.5    7.56  )-----------( 165      ( 17 Mar 2025 06:35 )             26.7     03-17    142  |  106  |  26[H]  ----------------------------<  95  3.5   |  23  |  1.73[H]    Ca    9.6      17 Mar 2025 06:35  Phos  2.8     03-17  Mg     2.2     03-17        Urinalysis Basic - ( 17 Mar 2025 06:35 )    Color: x / Appearance: x / SG: x / pH: x  Gluc: 95 mg/dL / Ketone: x  / Bili: x / Urobili: x   Blood: x / Protein: x / Nitrite: x   Leuk Esterase: x / RBC: x / WBC x   Sq Epi: x / Non Sq Epi: x / Bacteria: x          CAPILLARY BLOOD GLUCOSE    MICROBIOLOGY:     RADIOLOGY:  [ ] Reviewed and interpreted by me    Mode: standby   Patient is a 69y old  Male who presents with a chief complaint of Acute on chronic hypoxemic respiratory failure (17 Mar 2025 05:10)      Interval Events: no acute events overnight, tolerated trach collar    REVIEW OF SYSTEMS:  [ ] Positive  [X] All other systems negative  [ ] Unable to assess ROS because ________    Vital Signs Last 24 Hrs  T(C): 36.8 (03-17-25 @ 05:43), Max: 36.8 (03-16-25 @ 13:10)  T(F): 98.2 (03-17-25 @ 05:43), Max: 98.3 (03-16-25 @ 13:10)  HR: 84 (03-17-25 @ 06:35) (80 - 98)  BP: 149/74 (03-17-25 @ 05:43) (120/70 - 149/74)  RR: 18 (03-17-25 @ 05:43) (18 - 18)  SpO2: 100% (03-17-25 @ 06:35) (98% - 100%)    PHYSICAL EXAM:  HEENT:   [X]Tracheostomy: #7 cuffed portex   [X]Pupils equal  [ ]No oral lesions  [ ]Abnormal    SKIN  [ ]No Rash  [X] Abnormal -- macerated skin noted on perineum, buttocks, and inner thighs; Lt occipital scalp injury   [ ] pressure    CARDIAC  [X]Regular  [ ]Abnormal    PULMONARY  [X]Bilateral Clear Breath Sounds  [ ]Normal Excursion  [ ]Abnormal    GI  [X]PEG      [X] +BS		              [X]Soft, nondistended, nontender	  [ ]Abnormal    MUSCULOSKELETAL                                   [X]Bedbound                 [ ]Abnormal    [ ]Ambulatory/OOB to chair                           EXTREMITIES                                         [X]Normal  [ ]Edema                           NEUROLOGIC  [X] Normal, non focal  [ ] Focal findings:    PSYCHIATRIC  [X]Alert and appropriate  [ ] Sedated	 [ ]Agitated    :  [place]: [ ] Yes, if yes: Date of Placement:                   [X] No    LINES: Central Lines [ ] Yes, if yes: Date of Placement                                     [X] No    HOSPITAL MEDICATIONS:  MEDICATIONS  (STANDING):  albuterol/ipratropium for Nebulization 3 milliLiter(s) Nebulizer every 6 hours  atorvastatin 20 milliGRAM(s) Oral at bedtime  chlorhexidine 0.12% Liquid 15 milliLiter(s) Oral Mucosa every 12 hours  cholecalciferol 1000 Unit(s) Oral daily  enoxaparin Injectable 40 milliGRAM(s) SubCutaneous every 24 hours  epoetin krystyna-epbx (RETACRIT) Injectable 73979 Unit(s) SubCutaneous <User Schedule>  escitalopram 15 milliGRAM(s) Oral with breakfast  gabapentin Solution 150 milliGRAM(s) Oral every 12 hours  influenza  Vaccine (HIGH DOSE) 0.5 milliLiter(s) IntraMuscular once  lacosamide IVPB 200 milliGRAM(s) IV Intermittent every 12 hours  lamoTRIgine 50 milliGRAM(s) Oral two times a day  lanolin Ointment 1 Application(s) Topical daily  levETIRAcetam  IVPB 750 milliGRAM(s) IV Intermittent every 12 hours  lurasidone 20 milliGRAM(s) Oral at bedtime  nystatin Powder 1 Application(s) Topical every 8 hours  pantoprazole  Injectable 40 milliGRAM(s) IV Push daily  polyethylene glycol 3350 17 Gram(s) Oral daily  sevelamer carbonate Powder 1600 milliGRAM(s) Enteral Tube every 8 hours    MEDICATIONS  (PRN):  acetaminophen     Tablet .. 650 milliGRAM(s) Oral every 6 hours PRN Temp greater or equal to 38C (100.4F), Mild Pain (1 - 3)  aluminum hydroxide/magnesium hydroxide/simethicone Suspension 30 milliLiter(s) Oral every 4 hours PRN Dyspepsia  artificial tears (preservative free) Ophthalmic Solution 1 Drop(s) Both EYES every 6 hours PRN Dry Eyes  melatonin 3 milliGRAM(s) Oral at bedtime PRN Insomnia  ondansetron Injectable 4 milliGRAM(s) IV Push every 8 hours PRN Nausea and/or Vomiting      LABS:                        8.5    7.56  )-----------( 165      ( 17 Mar 2025 06:35 )             26.7     03-17    142  |  106  |  26[H]  ----------------------------<  95  3.5   |  23  |  1.73[H]    Ca    9.6      17 Mar 2025 06:35  Phos  2.8     03-17  Mg     2.2     03-17        Urinalysis Basic - ( 17 Mar 2025 06:35 )    Color: x / Appearance: x / SG: x / pH: x  Gluc: 95 mg/dL / Ketone: x  / Bili: x / Urobili: x   Blood: x / Protein: x / Nitrite: x   Leuk Esterase: x / RBC: x / WBC x   Sq Epi: x / Non Sq Epi: x / Bacteria: x          CAPILLARY BLOOD GLUCOSE    MICROBIOLOGY:     RADIOLOGY:  [ ] Reviewed and interpreted by me    Mode: standby

## 2025-03-17 NOTE — CHART NOTE - NSCHARTNOTEFT_GEN_A_CORE
NUTRITION FOLLOW UP NOTE    PATIENT SEEN FOR: follow up     SOURCE: [] Patient  [x] Current Medical Record  [x] Nursing  [] Family/support person at bedside  [x] Patient unavailable/inappropriate  [] Other:    CHART REVIEWED/EVENTS NOTED.  [] No changes to nutrition care plan to note  [x] Nutrition Status:  -Acute on chronic respiratory failure with hypoxia; Primarily caused by Covid, superimposed by bacterial pneumonia per team. now s/p trach 3/12  -Heme/Onc following for cancer with mets   -PEG placed 2025  -SHAGUFTA, Nephrology following     DIET ORDER:   Diet, NPO with Tube Feed:   Tube Feeding Modality: Nasogastric  Jevity 1.2 Garth (JEVITY1.2RTH)  Total Volume for 24 Hours (mL): 1080  Continuous  Starting Tube Feed Rate {mL per Hour}: 10  Increase Tube Feed Rate by (mL): 10     Every 4 hours  Until Goal Tube Feed Rate (mL per Hour): 60  Tube Feed Duration (in Hours): 18  Tube Feed Start Time: 11:00 (03-15-25)    CURRENT DIET ORDER IS:  [x] Appropriate: see below for recommendations  [] Inadequate:  [] Other:    NUTRITION INTAKE/PROVISION:  [] PO:  [x] Enteral Nutrition: providing at 100% provision 1296kcal (18kcal/kg) and 59g protein (0.8g/kg)  [] Parenteral Nutrition:    ANTHROPOMETRICS:  Drug Dosing Weight  Height (cm): 167.6 (14 Mar 2025 11:04)  Weight (kg): 89.1 (14 Mar 2025 11:04)  BMI (kg/m2): 31.7 (14 Mar 2025 11:04)  BSA (m2): 1.98 (14 Mar 2025 11:04)  Weights:   Daily Weight in k.8 (-12), Daily Weight in k.6 ()   Loss noted since admission. Malnutrition ongoing. RD will continue to monitor trends.     MEDICATIONS:  MEDICATIONS  (STANDING):  albuterol/ipratropium for Nebulization 3 milliLiter(s) Nebulizer every 6 hours  atorvastatin 20 milliGRAM(s) Oral at bedtime  chlorhexidine 0.12% Liquid 15 milliLiter(s) Oral Mucosa every 12 hours  cholecalciferol 1000 Unit(s) Oral daily  enoxaparin Injectable 40 milliGRAM(s) SubCutaneous every 24 hours  epoetin krystyna-epbx (RETACRIT) Injectable 39421 Unit(s) SubCutaneous <User Schedule>  escitalopram 15 milliGRAM(s) Oral with breakfast  gabapentin Solution 150 milliGRAM(s) Oral every 12 hours  influenza  Vaccine (HIGH DOSE) 0.5 milliLiter(s) IntraMuscular once  lacosamide IVPB 200 milliGRAM(s) IV Intermittent every 12 hours  lamoTRIgine 50 milliGRAM(s) Oral two times a day  lanolin Ointment 1 Application(s) Topical daily  levETIRAcetam  IVPB 750 milliGRAM(s) IV Intermittent every 12 hours  lurasidone 20 milliGRAM(s) Oral at bedtime  nystatin Powder 1 Application(s) Topical every 8 hours  pantoprazole  Injectable 40 milliGRAM(s) IV Push daily  polyethylene glycol 3350 17 Gram(s) Oral daily  sevelamer carbonate Powder 1600 milliGRAM(s) Enteral Tube every 8 hours    MEDICATIONS  (PRN):  acetaminophen     Tablet .. 650 milliGRAM(s) Oral every 6 hours PRN Temp greater or equal to 38C (100.4F), Mild Pain (1 - 3)  aluminum hydroxide/magnesium hydroxide/simethicone Suspension 30 milliLiter(s) Oral every 4 hours PRN Dyspepsia  artificial tears (preservative free) Ophthalmic Solution 1 Drop(s) Both EYES every 6 hours PRN Dry Eyes  melatonin 3 milliGRAM(s) Oral at bedtime PRN Insomnia  ondansetron Injectable 4 milliGRAM(s) IV Push every 8 hours PRN Nausea and/or Vomiting    NUTRITIONALLY PERTINENT LABS:   Na142 mmol/L Glu 95 mg/dL K+ 3.5 mmol/L Cr  1.73 mg/dL[H] BUN 26 mg/dL[H]  Phos 2.8 mg/dL  Alb 2.9 g/dL[L] ALT 37 U/L AST 50 U/L[H] Alkaline Phosphatase 251 U/L[H]    Finger Sticks:  POCT Blood Glucose.: 108 mg/dL ( @ 06:04)  POCT Blood Glucose.: 127 mg/dL ( @ 23:40)  POCT Blood Glucose.: 127 mg/dL ( @ 17:54)  POCT Blood Glucose.: 122 mg/dL ( @ 13:04)    NUTRITIONALLY PERTINENT MEDICATIONS/LABS:  [x] Reviewed  [x] Relevant notes on medications/labs:  -sodium, potassium, magnesium and phosphorus within normal limits   -renvela noted     EDEMA:  [x] Reviewed  [] Relevant notes:    GI/ I&O:  [x] Reviewed  [x] Relevant notes: multiple bowel movements daily per flow sheets. soft/lose per flow sheets.   [] Other:    SKIN:   per flow sheets:  right heel stage I  left side of head stage II    ESTIMATED NEEDS:  Based on: IBW + 10% 71kg  27-32kcal/kg 1917-2272kcal/day  1.3-1.6g/kg 92-114g protein/day  defer fluid needs to team    NUTRITION DIAGNOSIS:  [x] Prior Dx: Severe Acute Malnutrition   [x] New Dx: increased nutrient needs related to increased demand for nutrients as evidenced by wounds as above     EDUCATION:  [] Yes:  [x] Not appropriate/warranted    NUTRITION CARE PLAN:  1. Diet: Update EN to better align with pt nutritional needs. Consider Jevity 1.5 60ml/hr x 24 hours to provide a total volume of 1440ml, 2160kcal (30kcal/kg) and 92g protein (1.3g/kg). Defer free water flush to team.   2. Supplements: Add Banatrol daily to aid in stool bulking   3. Multivitamin/mineral supplementation: continue as ordered to aid in wound healing. also consider addition of Multivitamin to aid in wound healing if no contraindications     [] Achieved - Continue current nutrition intervention(s)  [] Current medical condition precludes nutrition intervention at this time.    MONITORING AND EVALUATION:   RD remains available upon request and will follow up per protocol.    Ansley Rodriguez, MS, RD, CDN / Teams

## 2025-03-17 NOTE — SPEAKING VALVE EVALUATION - COMMENTS
Imaging:   CT chest with b/l GGO, new since 1/30/25. Likely COVID PNA +/- superimposed bacterial PNA; tracheomalacia.   MRI brain now with 5.4 mm enhancing lesion in the LEFT middle cerebellar peduncle with associated edema suspicious for metastases. Airway placement 3/8, trach 3/12 (remains intubated and sedated). Therapeutic aspiration of secretions of RLL. No bleeding noted. PEG placement 3/14. 3/17: On trach collar ON 40%. PMV evaluation       Swallow hx: Pt known to this service w/ initial bedside swallow evaluation completed January 2023 and FEES completed during current hospital course. FEES (2/21/25) revealing an clare-pharyngeal dysphagia. Pt w/ premature spillage with mildly thick liquids and subsequent aspiration during the swallow. Case discussed with DARIAN Horton w/ recommendations for patient to be NPO, with non-oral nutrition/hydration/medications given change in respiratory status.

## 2025-03-17 NOTE — PROGRESS NOTE ADULT - ASSESSMENT
69 year old male with a past medical history of hypertension, hyperlipemia VAZQUEZ (on CPAP at bedtime, NC 2 liters during the day), CKD stage 3, epilepsy, schizophrenia, developmental delay, metastatic testicular cancer (s/p orchiectomy, actively on chemotherapy, last dose of etoposide/cisplatin on 6/2024), presenting with acute on chronic hypoxemic respiratory failure, secondary to (a) COVID-19 infection, (b) superimposed pneumonia after recent influenza infection,  Transferred to MICU on 2/23 after RRT on floors due to hypoxia even with max setting AVAPS. Patient started on midodrine and weaned off of levo on 2/24. Failed pressure support on 2/25 and started diuresis with Bumex. CTH was unremarkable and weaned off of Precedex on 3/1. On 3/4 patient extubated and [facility] conversations with family revealed that they would want patient to be trached. On 3/7, patient found to have RLL consolidation on POCUS and started on Zosyn, intubated on 3/8 due to increased lethargy and inability to clear oral secretions. Trach placed on 3/12. Transferred to RCU on 3/13. S/p Peg 3/14.  EGD with normal findings.     3/16: Did well overnight on continuous PSV 6/6, Tolerated TC during all day yesterday. Will attempt TC ATC 1st night tonight and f/u AM ABG. CR continues to downtrend (2.01), peaked to 4.34 on 3/3. Pseudomonas PNA on + Bal Cx/ recurrent PSA UTI- completed Levaquin 3/15.  Prophylactic AC started 3/15, stable H/H and platelets. Repeat echo ordered to evaluate for possible L atrial thrombus.   69 year old male with a past medical history of hypertension, hyperlipemia VAZQUEZ (on CPAP at bedtime, NC 2 liters during the day), CKD stage 3, epilepsy, schizophrenia, developmental delay, metastatic testicular cancer (s/p orchiectomy, actively on chemotherapy, last dose of etoposide/cisplatin on 6/2024), presenting with acute on chronic hypoxemic respiratory failure, secondary to (a) COVID-19 infection, (b) superimposed pneumonia after recent influenza infection,  Transferred to MICU on 2/23 after RRT on floors due to hypoxia even with max setting AVAPS. Patient started on midodrine and weaned off of levo on 2/24. Failed pressure support on 2/25 and started diuresis with Bumex. CTH was unremarkable and weaned off of Precedex on 3/1. On 3/4 patient extubated and [facility] conversations with family revealed that they would want patient to be trached. On 3/7, patient found to have RLL consolidation on POCUS and started on Zosyn, intubated on 3/8 due to increased lethargy and inability to clear oral secretions. Trach placed on 3/12. Transferred to RCU on 3/13. S/p Peg 3/14.  EGD with normal findings.     3/17: On trach collar ON 40% -- AM ABG 7.37 / 43 / 140 / 25. TTE to evaluate for possible L atrial thrombus ordered for today 3/17. Cr downtrending 2.01 --> 1.73. Pseudomonas PNA on + Bal Cx/ recurrent PSA UTI- completed Levaquin 3/15.  Cards says can start BB and Eliquis -- f/u with Heme/onc re: starting. 69 year old male with a past medical history of hypertension, hyperlipemia VAZQUEZ (on CPAP at bedtime, NC 2 liters during the day), CKD stage 3, epilepsy, schizophrenia, developmental delay, metastatic testicular cancer (s/p orchiectomy, actively on chemotherapy, last dose of etoposide/cisplatin on 6/2024), presenting with acute on chronic hypoxemic respiratory failure, secondary to (a) COVID-19 infection, (b) superimposed pneumonia after recent influenza infection,  Transferred to MICU on 2/23 after RRT on floors due to hypoxia even with max setting AVAPS. Patient started on midodrine and weaned off of levo on 2/24. Failed pressure support on 2/25 and started diuresis with Bumex. CTH was unremarkable and weaned off of Precedex on 3/1. On 3/4 patient extubated and [facility] conversations with family revealed that they would want patient to be trached. On 3/7, patient found to have RLL consolidation on POCUS and started on Zosyn, intubated on 3/8 due to increased lethargy and inability to clear oral secretions. Trach placed on 3/12. Transferred to RCU on 3/13. S/p Peg 3/14.  EGD with normal findings.     3/17: On trach collar ON 40% -- AM ABG 7.37 / 43 / 140 / 25. TTE to evaluate for possible L atrial thrombus ordered for today 3/17 -- report No obvious Left atrial thrombus; unable to evaluate left atrial appendage on TTE.. Cr downtrending 2.01 --> 1.73. Pseudomonas PNA on + Bal Cx/ recurrent PSA UTI- completed Levaquin 3/15.  Cards says can start BB and Eliquis -- f/u with Heme/onc re: starting. 69 year old male with a past medical history of hypertension, hyperlipemia VAZQUEZ (on CPAP at bedtime, NC 2 liters during the day), CKD stage 3, epilepsy, schizophrenia, developmental delay, metastatic testicular cancer (s/p orchiectomy, actively on chemotherapy, last dose of etoposide/cisplatin on 6/2024), presenting with acute on chronic hypoxemic respiratory failure, secondary to (a) COVID-19 infection, (b) superimposed pneumonia after recent influenza infection,  Transferred to MICU on 2/23 after RRT on floors due to hypoxia even with max setting AVAPS. Patient started on midodrine and weaned off of levo on 2/24. Failed pressure support on 2/25 and started diuresis with Bumex. CTH was unremarkable and weaned off of Precedex on 3/1. On 3/4 patient extubated and [facility] conversations with family revealed that they would want patient to be trached. On 3/7, patient found to have RLL consolidation on POCUS and started on Zosyn, intubated on 3/8 due to increased lethargy and inability to clear oral secretions. Trach placed on 3/12. Transferred to RCU on 3/13. S/p Peg 3/14.  EGD with normal findings.     3/17: On trach collar ON 40% -- AM ABG 7.37 / 43 / 140 / 25. TTE to evaluate for possible L atrial thrombus ordered for today 3/17 -- report No obvious Left atrial thrombus; unable to evaluate left atrial appendage on TTE.. Cr downtrending 2.01 --> 1.73. Pseudomonas PNA on + Bal Cx/ recurrent PSA UTI- completed Levaquin 3/15.  Cards says can start BB and Eliquis -- f/u with Heme/onc re: starting. Speaking valve trial OK on 3/17 -- FEES for 3/18. 69 year old male with a past medical history of hypertension, hyperlipemia VAZQUEZ (on CPAP at bedtime, NC 2 liters during the day), CKD stage 3, epilepsy, schizophrenia, developmental delay, metastatic testicular cancer (s/p orchiectomy, actively on chemotherapy, last dose of etoposide/cisplatin on 6/2024), presenting with acute on chronic hypoxemic respiratory failure, secondary to (a) COVID-19 infection, (b) superimposed pneumonia after recent influenza infection,  Transferred to MICU on 2/23 after RRT on floors due to hypoxia even with max setting AVAPS. Patient started on midodrine and weaned off of levo on 2/24. Failed pressure support on 2/25 and started diuresis with Bumex. CTH was unremarkable and weaned off of Precedex on 3/1. On 3/4 patient extubated and [facility] conversations with family revealed that they would want patient to be trached. On 3/7, patient found to have RLL consolidation on POCUS and started on Zosyn, intubated on 3/8 due to increased lethargy and inability to clear oral secretions. Trach placed on 3/12. Transferred to RCU on 3/13. S/p Peg 3/14.  EGD with normal findings.     3/17: On trach collar ON 40% -- AM ABG 7.37 / 43 / 140 / 25. O2 lowered to 30% -- ABG ordered for AM. TTE to evaluate for possible L atrial thrombus ordered for today 3/17 -- report No obvious Left atrial thrombus; unable to evaluate left atrial appendage on TTE.. Cr downtrending 2.01 --> 1.73. Pseudomonas PNA on + Bal Cx/ recurrent PSA UTI- completed Levaquin 3/15.  Cards says can start BB and Eliquis -- f/u with Heme/onc re: starting. Speaking valve trial OK on 3/17 -- FEES for 3/18.

## 2025-03-17 NOTE — PROGRESS NOTE ADULT - SUBJECTIVE AND OBJECTIVE BOX
[facility]  JACINTO Horton Y. Patel, S. Shah, G. Casimir  [phone]  ([phone] - weekdays after 5pm and weekends)    Name: OSCAR MILAN  Age/Gender: 69y Male  MRN: [id number]    Interval History:  Patient seen and examined this morning.  Resting comfortably.   Notes reviewed  No concerning overnight events  Afebrile   Allergies: seasonal allergies (Unknown)  No Known Drug Allergies      Objective:  Vitals:   T(F): 98.2 (03-17-25 @ 05:43), Max: 98.3 (03-16-25 @ 13:10)  HR: 94 (03-17-25 @ 08:40) (80 - 98)  BP: 149/74 (03-17-25 @ 05:43) (120/70 - 149/74)  RR: 18 (03-17-25 @ 05:43) (18 - 18)  SpO2: 100% (03-17-25 @ 08:40) (98% - 100%)  Physical Examination:  General: no acute distress  HEENT: normocephalic, atraumatic, trach collar  Respiratory: decreased breath sounds b/l   Cardiovascular: S1 and S2 present, normal rate   Gastrointestinal: soft, nontender, nondistended  Extremities: no edema, no cyanosis  Skin: no visible rash    Laboratory Studies:  CBC:                       8.5    7.56  )-----------( 165      ( 17 Mar 2025 06:35 )             26.7     WBC Trend:  7.56 03-17-25 @ 06:35  7.61 03-16-25 @ 09:28  7.74 03-15-25 @ 09:38  11.78 03-14-25 @ 07:13  7.78 03-13-25 @ 00:16  6.57 03-12-25 @ 00:26  6.95 03-11-25 @ 00:37    CMP: 03-17    142  |  106  |  26[H]  ----------------------------<  95  3.5   |  23  |  1.73[H]    Ca    9.6      17 Mar 2025 06:35  Phos  2.8     03-17  Mg     2.2     03-17      Creatinine: 1.73 mg/dL (03-17-25 @ 06:35)  Creatinine: 2.01 mg/dL (03-16-25 @ 09:29)  Creatinine: 2.28 mg/dL (03-15-25 @ 09:38)  Creatinine: 2.67 mg/dL (03-14-25 @ 07:14)  Creatinine: 3.06 mg/dL (03-13-25 @ 00:16)  Creatinine: 2.81 mg/dL (03-12-25 @ 00:26)  Creatinine: 3.00 mg/dL (03-11-25 @ 00:36)    Microbiology: reviewed   Culture - Urine (collected 03-09-25 @ 18:14)  Source: Catheterized Catheterized  Final Report (03-11-25 @ 13:23):    >100,000 CFU/ml Pseudomonas aeruginosa  Organism: Pseudomonas aeruginosa (03-11-25 @ 13:23)  Organism: Pseudomonas aeruginosa (03-11-25 @ 13:23)      Method Type: MARIELENA      -  Amikacin: S <=16      -  Aztreonam: R >16      -  Cefepime: I 16      -  Ceftazidime: R >16      -  Ciprofloxacin: S <=0.25      -  Imipenem: S <=1      -  Levofloxacin: S <=0.5      -  Meropenem: S <=1      -  Piperacillin/Tazobactam: R >64    Culture - Bronchial (collected 03-08-25 @ 15:27)  Source: Bronchial Bronchial Lavage  Gram Stain (03-09-25 @ 00:20):    Moderate polymorphonuclear leukocytes per low power field    No Squamous epithelial cells per low power field    Moderate Gram Negative Rods per oil power field    Few Gram positive cocci in pairs per oil power field  Final Report (03-10-25 @ 15:13):    Moderate Pseudomonas aeruginosa    Commensal lucia consistent with body site  Organism: Pseudomonas aeruginosa (03-10-25 @ 15:13)  Organism: Pseudomonas aeruginosa (03-10-25 @ 15:13)      Method Type: MARIELENA      -  Aztreonam: S 8      -  Cefepime: S 8      -  Ceftazidime: S 8      -  Ciprofloxacin: S <=0.25      -  Imipenem: S <=1      -  Levofloxacin: S <=0.5      -  Meropenem: S <=1      -  Piperacillin/Tazobactam: R 64    Culture - Blood (collected 03-08-25 @ 00:00)  Source: Blood Blood  Final Report (03-13-25 @ 04:00):    No growth at 5 days    Culture - Blood (collected 03-07-25 @ 23:30)  Source: Blood Blood  Final Report (03-13-25 @ 04:00):    No growth at 5 days    Culture - Sputum (collected 03-07-25 @ 18:42)  Source: Sputum Sputum  Gram Stain (03-08-25 @ 07:01):    Rare polymorphonuclear leukocytes per low power field    Few Squamous epithelial cells per low power field    Moderate Gram Negative Rods seen per oil power field  Final Report (03-09-25 @ 16:20):    Numerous Pseudomonas aeruginosa    Commensal lucia consistent with body site  Organism: Pseudomonas aeruginosa (03-09-25 @ 16:20)  Organism: Pseudomonas aeruginosa (03-09-25 @ 16:20)      Method Type: MARIELENA      -  Aztreonam: S 8      -  Cefepime: S 8      -  Ceftazidime: S 8      -  Ciprofloxacin: S <=0.25      -  Imipenem: S <=1      -  Levofloxacin: S <=0.5      -  Meropenem: S <=1      -  Piperacillin/Tazobactam: R 64    Culture - Eye (collected 03-05-25 @ 16:15)  Source: Eye Conjunctiva-Right  Gram Stain (03-08-25 @ 00:03):    No polymorphonuclear cells seen    No organisms seen    by cytocentrifuge  Final Report (03-10-25 @ 19:22):    Rare Staphylococcus epidermidis  Organism: Staphylococcus epidermidis (03-10-25 @ 19:22)      Method Type: MARIELENA      -  Clindamycin: R 4      -  Erythromycin: S <=0.25      -  Gentamicin: S <=4 Should not be used as monotherapy      -  Oxacillin: R 2      -  Penicillin: R 1      -  Rifampin: S <=1 Should not be used as monotherapy      -  Tetracycline: S <=4      -  Trimethoprim/Sulfamethoxazole: S <=0.5/9.5      -  Vancomycin: S 1  Organism: Staphylococcus epidermidis (03-10-25 @ 19:22)    Radiology: reviewed     Medications:  acetaminophen     Tablet .. 650 milliGRAM(s) Oral every 6 hours PRN  albuterol/ipratropium for Nebulization 3 milliLiter(s) Nebulizer every 6 hours  aluminum hydroxide/magnesium hydroxide/simethicone Suspension 30 milliLiter(s) Oral every 4 hours PRN  artificial tears (preservative free) Ophthalmic Solution 1 Drop(s) Both EYES every 6 hours PRN  atorvastatin 20 milliGRAM(s) Oral at bedtime  chlorhexidine 0.12% Liquid 15 milliLiter(s) Oral Mucosa every 12 hours  cholecalciferol 1000 Unit(s) Oral daily  enoxaparin Injectable 40 milliGRAM(s) SubCutaneous every 24 hours  epoetin krystyna-epbx (RETACRIT) Injectable 12675 Unit(s) SubCutaneous <User Schedule>  escitalopram 15 milliGRAM(s) Oral with breakfast  gabapentin Solution 150 milliGRAM(s) Oral every 12 hours  influenza  Vaccine (HIGH DOSE) 0.5 milliLiter(s) IntraMuscular once  lacosamide IVPB 200 milliGRAM(s) IV Intermittent every 12 hours  lamoTRIgine 50 milliGRAM(s) Oral two times a day  lanolin Ointment 1 Application(s) Topical daily  levETIRAcetam  IVPB 750 milliGRAM(s) IV Intermittent every 12 hours  lurasidone 20 milliGRAM(s) Oral at bedtime  melatonin 3 milliGRAM(s) Oral at bedtime PRN  nystatin Powder 1 Application(s) Topical every 8 hours  ondansetron Injectable 4 milliGRAM(s) IV Push every 8 hours PRN  pantoprazole  Injectable 40 milliGRAM(s) IV Push daily  polyethylene glycol 3350 17 Gram(s) Oral daily  sevelamer carbonate Powder 1600 milliGRAM(s) Enteral Tube every 8 hours    Current Antimicrobials:    Prior/Completed Antimicrobials:  levoFLOXacin IVPB  levoFLOXacin IVPB  levoFLOXacin IVPB  piperacillin/tazobactam IVPB.  piperacillin/tazobactam IVPB.-  piperacillin/tazobactam IVPB.-  piperacillin/tazobactam IVPB..  piperacillin/tazobactam IVPB..  piperacillin/tazobactam IVPB...  remdesivir  IVPB  remdesivir  IVPB  remdesivir  IVPB  vancomycin  IVPB.

## 2025-03-17 NOTE — PROGRESS NOTE ADULT - SUBJECTIVE AND OBJECTIVE BOX
[facility]   [phone]  RENAL FOLLOW UP NOTE  --------------------------------------------------------------------------------  Chief Complaint:    24 hour events/subjective:    patient seen and examined  trach    PAST HISTORY  --------------------------------------------------------------------------------  No significant changes to PMH, PSH, FHx, SHx, unless otherwise noted    ALLERGIES & MEDICATIONS  --------------------------------------------------------------------------------  Allergies    seasonal allergies (Unknown)  No Known Drug Allergies    Intolerances    latex (Rash)    Standing Inpatient Medications  albuterol/ipratropium for Nebulization 3 milliLiter(s) Nebulizer every 6 hours  atorvastatin 20 milliGRAM(s) Oral at bedtime  chlorhexidine 0.12% Liquid 15 milliLiter(s) Oral Mucosa every 12 hours  cholecalciferol 1000 Unit(s) Oral daily  enoxaparin Injectable 40 milliGRAM(s) SubCutaneous every 24 hours  epoetin krystyna-epbx (RETACRIT) Injectable 67004 Unit(s) SubCutaneous <User Schedule>  escitalopram 15 milliGRAM(s) Oral with breakfast  gabapentin Solution 150 milliGRAM(s) Oral every 12 hours  influenza  Vaccine (HIGH DOSE) 0.5 milliLiter(s) IntraMuscular once  lacosamide IVPB 200 milliGRAM(s) IV Intermittent every 12 hours  lamoTRIgine 50 milliGRAM(s) Oral two times a day  lanolin Ointment 1 Application(s) Topical daily  levETIRAcetam  IVPB 750 milliGRAM(s) IV Intermittent every 12 hours  lurasidone 20 milliGRAM(s) Oral at bedtime  nystatin Powder 1 Application(s) Topical every 8 hours  pantoprazole  Injectable 40 milliGRAM(s) IV Push daily  polyethylene glycol 3350 17 Gram(s) Oral daily  sevelamer carbonate Powder 1600 milliGRAM(s) Enteral Tube every 8 hours    PRN Inpatient Medications  acetaminophen     Tablet .. 650 milliGRAM(s) Oral every 6 hours PRN  aluminum hydroxide/magnesium hydroxide/simethicone Suspension 30 milliLiter(s) Oral every 4 hours PRN  artificial tears (preservative free) Ophthalmic Solution 1 Drop(s) Both EYES every 6 hours PRN  melatonin 3 milliGRAM(s) Oral at bedtime PRN  ondansetron Injectable 4 milliGRAM(s) IV Push every 8 hours PRN      REVIEW OF SYSTEMS  --------------------------------------------------------------------------------      VITALS/PHYSICAL EXAM  --------------------------------------------------------------------------------  T(C): 37.2 (03-17-25 @ 11:15), Max: 37.2 (03-17-25 @ 11:15)  HR: 97 (03-17-25 @ 11:15) (80 - 98)  BP: 145/78 (03-17-25 @ 11:15) (120/70 - 149/74)  RR: 18 (03-17-25 @ 11:15) (18 - 18)  SpO2: 100% (03-17-25 @ 11:15) (98% - 100%)  Wt(kg): --        03-16-25 @ 07:01  -  03-17-25 @ 07:00  --------------------------------------------------------  IN: 1660 mL / OUT: 400 mL / NET: 1260 mL      Physical Exam:  	              Gen: ill appearing male, trach collar  	Pulm: decrease bs, +coarse bs    	CV: No JVD. RRR, S1S2; no rub  	Abd: +BS, soft, nondistended  	UE: Warm, no cyanosis  no clubbing,  no edema;  	LE: Warm, no cyanosis  no clubbing, no edema    LABS/STUDIES  --------------------------------------------------------------------------------              8.5    7.56  >-----------<  165      [03-17-25 @ 06:35]              26.7     142  |  106  |  26  ----------------------------<  95      [03-17-25 @ 06:35]  3.5   |  23  |  1.73        Ca     9.6     [03-17-25 @ 06:35]      Mg     2.2     [03-17-25 @ 06:35]      Phos  2.8     [03-17-25 @ 06:35]            Creatinine Trend:  SCr 1.73 [03-17 @ 06:35]  SCr 2.01 [03-16 @ 09:29]  SCr 2.28 [03-15 @ 09:38]  SCr 2.67 [03-14 @ 07:14]  SCr 3.06 [03-13 @ 00:16]                Ferritin [id number]      [02-23-25 @ 06:15]  TSH 0.87      [01-25-25 @ 11:31]

## 2025-03-17 NOTE — SPEECH LANGUAGE PATHOLOGY EVALUATION - SLP PERTINENT HISTORY OF CURRENT PROBLEM
69 year old male with a past medical history of hypertension, hyperlipemia VAZQUEZ (on CPAP at bedtime, NC 2 liters during the day), CKD stage 3, epilepsy, schizophrenia, developmental delay, metastatic testicular cancer (s/p orchiectomy, actively on chemotherapy, last dose of etoposide/cisplatin on 6/2024) presenting with worsening shortness of breath. Patient was recently hospitalized at [facility] from January 27th 2025 to February 6th 2025 for seizure and influenza virus infection, which required intubation. He was sent to rehab (originally from home) from hospital. He returns due to sudden onset shortness of breath and worsening oxygenation. Of note, he tested positive for COVID-19 at facility on 2/13/25 and was not put on any COVID-19 treatment at the time, only guaifenesin and scopolamine patch. Patient was placed on non-rebreather and sent to the hospital on 2/16/25. 68 y/o M with PMH of HTN, HLD, VAZQUEZ on CPAP and home O2 (2LNC daytime), CKD, epilepsy, schizophrenia developmental delay, metastatic testicular CA. Recent admission at [facility] for acute respiratory failure in the setting of seizures, influenza infection, pulmonary edema, PNA requiring intubation in MICU. Was eventually discharged to rehab. Presents now with SOB, found to be COVID + at facility on 2/13. Febrile on admission to ED to 101.9F. Placed on Bipap for increased WOB. Pulmonary called to consult for acute respiratory failure.

## 2025-03-17 NOTE — PROGRESS NOTE ADULT - SUBJECTIVE AND OBJECTIVE BOX
[facility] HEMATOLOGY/ONCOLOGY INPATIENT PROGRESS NOTE     Interval Hx:   03-17-25: Mr. Gr was seen at bedside today.    Meds:   MEDICATIONS  (STANDING):  albuterol/ipratropium for Nebulization 3 milliLiter(s) Nebulizer every 6 hours  atorvastatin 20 milliGRAM(s) Oral at bedtime  chlorhexidine 0.12% Liquid 15 milliLiter(s) Oral Mucosa every 12 hours  cholecalciferol 1000 Unit(s) Oral daily  enoxaparin Injectable 40 milliGRAM(s) SubCutaneous every 24 hours  epoetin krystyna-epbx (RETACRIT) Injectable 53673 Unit(s) SubCutaneous <User Schedule>  escitalopram 15 milliGRAM(s) Oral with breakfast  gabapentin Solution 150 milliGRAM(s) Oral every 12 hours  influenza  Vaccine (HIGH DOSE) 0.5 milliLiter(s) IntraMuscular once  lacosamide IVPB 200 milliGRAM(s) IV Intermittent every 12 hours  lamoTRIgine 50 milliGRAM(s) Oral two times a day  lanolin Ointment 1 Application(s) Topical daily  levETIRAcetam  IVPB 750 milliGRAM(s) IV Intermittent every 12 hours  lurasidone 20 milliGRAM(s) Oral at bedtime  nystatin Powder 1 Application(s) Topical every 8 hours  pantoprazole  Injectable 40 milliGRAM(s) IV Push daily  polyethylene glycol 3350 17 Gram(s) Oral daily  sevelamer carbonate Powder 1600 milliGRAM(s) Enteral Tube every 8 hours    MEDICATIONS  (PRN):  acetaminophen     Tablet .. 650 milliGRAM(s) Oral every 6 hours PRN Temp greater or equal to 38C (100.4F), Mild Pain (1 - 3)  aluminum hydroxide/magnesium hydroxide/simethicone Suspension 30 milliLiter(s) Oral every 4 hours PRN Dyspepsia  artificial tears (preservative free) Ophthalmic Solution 1 Drop(s) Both EYES every 6 hours PRN Dry Eyes  melatonin 3 milliGRAM(s) Oral at bedtime PRN Insomnia  ondansetron Injectable 4 milliGRAM(s) IV Push every 8 hours PRN Nausea and/or Vomiting    Vital Signs Last 24 Hrs  T(C): 36.2 (17 Mar 2025 00:24), Max: 36.8 (16 Mar 2025 13:10)  T(F): 97.1 (17 Mar 2025 00:24), Max: 98.3 (16 Mar 2025 13:10)  HR: 93 (17 Mar 2025 02:52) (72 - 98)  BP: 129/73 (17 Mar 2025 00:24) (120/70 - 147/75)  BP(mean): --  RR: 18 (17 Mar 2025 00:24) (18 - 18)  SpO2: 100% (17 Mar 2025 02:52) (98% - 100%)    Parameters below as of 17 Mar 2025 02:28  Patient On (Oxygen Delivery Method): tracheostomy collar    Physical Exam:  Gen: NAD, tracheostomy with ETT  HEENT: EOMI, MMM  Chest: equal chest rise  Cardiac: regular   Abd: non distended    Labs:                        8.1    7.61  )-----------( 162      ( 16 Mar 2025 09:28 )             25.4     CBC Full  -  ( 16 Mar 2025 09:28 )  WBC Count : 7.61 K/uL  RBC Count : 2.63 M/uL  Hemoglobin : 8.1 g/dL  Hematocrit : 25.4 %  Platelet Count - Automated : 162 K/uL  Mean Cell Volume : 96.6 fl  Mean Cell Hemoglobin : 30.8 pg  Mean Cell Hemoglobin Concentration : 31.9 g/dL    03-16    139  |  105  |  32[H]  ----------------------------<  108[H]  3.6   |  22  |  2.01[H]    Ca    9.9      16 Mar 2025 09:29  Phos  2.9     03-16  Mg     1.8     03-16         [facility]AT[facility] INPATIENT PROGRESS NOTE     Interval Hx:   03-17-25: Mr. Gr was seen at bedside today, no overnight events noted however continues with need for suctioning due to increase secretions, discussed with overnight staff, mild bleeding likely due to trauma from suctioning and recent tracheostomy placement without evidence of active bleeding otherwise. No fevers noted overnight. Had multiple smaller BMs, without evidence of bleeding, Nephrology recs and reviewed noted for Epo    Meds:   MEDICATIONS  (STANDING):  albuterol/ipratropium for Nebulization 3 milliLiter(s) Nebulizer every 6 hours  atorvastatin 20 milliGRAM(s) Oral at bedtime  chlorhexidine 0.12% Liquid 15 milliLiter(s) Oral Mucosa every 12 hours  cholecalciferol 1000 Unit(s) Oral daily  enoxaparin Injectable 40 milliGRAM(s) SubCutaneous every 24 hours  epoetin krystyna-epbx (RETACRIT) Injectable 12359 Unit(s) SubCutaneous <User Schedule>  escitalopram 15 milliGRAM(s) Oral with breakfast  gabapentin Solution 150 milliGRAM(s) Oral every 12 hours  influenza  Vaccine (HIGH DOSE) 0.5 milliLiter(s) IntraMuscular once  lacosamide IVPB 200 milliGRAM(s) IV Intermittent every 12 hours  lamoTRIgine 50 milliGRAM(s) Oral two times a day  lanolin Ointment 1 Application(s) Topical daily  levETIRAcetam  IVPB 750 milliGRAM(s) IV Intermittent every 12 hours  lurasidone 20 milliGRAM(s) Oral at bedtime  nystatin Powder 1 Application(s) Topical every 8 hours  pantoprazole  Injectable 40 milliGRAM(s) IV Push daily  polyethylene glycol 3350 17 Gram(s) Oral daily  sevelamer carbonate Powder 1600 milliGRAM(s) Enteral Tube every 8 hours    MEDICATIONS  (PRN):  acetaminophen     Tablet .. 650 milliGRAM(s) Oral every 6 hours PRN Temp greater or equal to 38C (100.4F), Mild Pain (1 - 3)  aluminum hydroxide/magnesium hydroxide/simethicone Suspension 30 milliLiter(s) Oral every 4 hours PRN Dyspepsia  artificial tears (preservative free) Ophthalmic Solution 1 Drop(s) Both EYES every 6 hours PRN Dry Eyes  melatonin 3 milliGRAM(s) Oral at bedtime PRN Insomnia  ondansetron Injectable 4 milliGRAM(s) IV Push every 8 hours PRN Nausea and/or Vomiting    Vital Signs Last 24 Hrs  T(C): 36.2 (17 Mar 2025 00:24), Max: 36.8 (16 Mar 2025 13:10)  T(F): 97.1 (17 Mar 2025 00:24), Max: 98.3 (16 Mar 2025 13:10)  HR: 93 (17 Mar 2025 02:52) (72 - 98)  BP: 129/73 (17 Mar 2025 00:24) (120/70 - 147/75)  BP(mean): --  RR: 18 (17 Mar 2025 00:24) (18 - 18)  SpO2: 100% (17 Mar 2025 02:52) (98% - 100%)    Parameters below as of 17 Mar 2025 02:28  Patient On (Oxygen Delivery Method): tracheostomy collar    Physical Exam:  Gen: NAD, tracheostomy with ETT  HEENT: EOMI, MMM  Chest: equal chest rise  Cardiac: regular   Abd: non distended    Labs:                        8.1    7.61  )-----------( 162      ( 16 Mar 2025 09:28 )             25.4     CBC Full  -  ( 16 Mar 2025 09:28 )  WBC Count : 7.61 K/uL  RBC Count : 2.63 M/uL  Hemoglobin : 8.1 g/dL  Hematocrit : 25.4 %  Platelet Count - Automated : 162 K/uL  Mean Cell Volume : 96.6 fl  Mean Cell Hemoglobin : 30.8 pg  Mean Cell Hemoglobin Concentration : 31.9 g/dL    03-16    139  |  105  |  32[H]  ----------------------------<  108[H]  3.6   |  22  |  2.01[H]    Ca    9.9      16 Mar 2025 09:29  Phos  2.9     03-16  Mg     1.8     03-16

## 2025-03-17 NOTE — SPEAKING VALVE EVALUATION - SLP PERTINENT HISTORY OF CURRENT PROBLEM
68 y/o M with PMH of HTN, HLD, VAZQUEZ on CPAP and home O2 (2LNC daytime), CKD, epilepsy, schizophrenia developmental delay, metastatic testicular CA. Recent admission at [facility] for acute respiratory failure in the setting of seizures, influenza infection, pulmonary edema, PNA requiring intubation in MICU. Was eventually discharged to rehab. Presents now with SOB, found to be COVID + at facility on 2/13. Febrile on admission to ED to 101.9F. Placed on Bipap for increased WOB. Pulmonary called to consult for acute respiratory failure.

## 2025-03-17 NOTE — PROGRESS NOTE ADULT - ASSESSMENT
Mr. Gr is a 69 year old male with PMHx of seizure disorder, sleep apnea, HTN, chronic idiopathic constipation, stage III CKD, severe obesity, secondary hyperparathyroidism, anemia, thrombocytopenia, hyperlipidemia, testicular cancer under treatment with Dr. Ovalles who is admitted for acute hypoxic respiratory failure secondary to COVID vs superimposed pneumonia with new onset thrombocytopenia. He was recently discharged 02/06/2025 after a tenous stay including intubation in the ICU for respiratory failure in setting of seizure. He had recovered and was discharged to rehab.      Former smoker, quit 14y prior, denied ETOH, drug use. Lives at home. Does not have children. Denies family history of blood disorders or malignancy.  Denies previous workplace related exposures.  Denies previous history of blood transfusions.  Denied history of thromboses.  Denied history of  requiring anticoagulation.     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  Margins were negative.  pT3Nx. CT C/A/P with few left para-aortic and left iliac chain lymph nodes largest measuring 8.1 cm left para-aortic node, bilateral subcentimeter noncalcified pulmonary nodules measuring up to 7 mm. AFP, LDH, hCG were all elevated. 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 Started on adjuvant therapy with Cisplatin+Etoposide, so far completed 4 cycles of treatment with cisplatin dose reduced 50% and Etoposide 50% due to thrombocytopenia.      02/19/2025: on HFNC, noted tachycardia and fever, Klebsiella in urine as well, thrombocytopenia stable/improving, no signs of active bleeding     02/20/2025: developed A.fib with RVR and was placed on heparin drip and pending cardiology eval    02/21/2025: awake, on AVAPS overnight, noted RRT for hypoxia as pt removed HFNC at some point in time, good response thereafter     02/22/2025:  continues on AVAPS this morning, noted RRT overnight for hypoxia, hypercapnia, ICU following, US LE negative for DVT, counts overall stable/improved     02/23/2025: progressive respiratory failure, noted ongoing RTT this morning with pending intubation and transfer to MICU     02/24/2025: intubated 02/23/2025, sedated, on pressor support, no signs of bleeding, counts noted, no signs of bleeding     02/25/2025: continues in MICU, intubated and sedated, no signs of bleeding    02/26/2025: continues in MICU, intubated, without signs of bleeding, continues on heparin drip     02/27/2025:  remains under the care of the ICU, intubated, no signs of bleeding and continues on heparin drip, counts stable, has not required PRBC support this admission    02/28/2025: continues under the care of MICU, continues intubated, no signs of bleeding, on heparin drip    03/01/2025: continues in MICU, intubated, direct josefina IgG positive, eluate negative, not likely to be clinically significant     03/02/2025:  continues in MICU, nursing staff at bedside, no overnight events noted. CTH without acute intracranial pathology     03/03/2025: continues in MICU, intubated, on heparin drip, 1u PRBC overnight, no signs of bleeding, platelet count stable     03/04/2025: awake, moving arms spontaneously on exam, continues without much interaction however. No signs of bleeding, continues heparin drip, continues intubated in MICU     03/05/2025: extubated 03/04/2025, continues in MICU, awake and alert this morning and able to speak full sentences, discussed/reviewed findings, continues on heparin drip     03/06/2025: nursing staff at bedside, bipap overnight, without signs of bleeding, counts noted stable, renal function improving     03/07/2025:  no signs of bleeding, counts noted, continues heparin drip, continues in MICU    03/08/2025: on HFNC, no signs of bleeding, although alert and answering questions, not back to his baseline yet, continues in MICU    03/09/2025: continues in MICU, s/p re-intubation 03/08/2025 given respiratory compromise in the setting of copious secretions as evidenced by bronchoscopy, in setting of fever, now sedated, mild crusting of blood around mouth, without active sign of bleeding, counts noted reviewed, continues on heparin drip     03/10/2025: continues in MICU, no signs of bleeding, discussed with nursing staff at bedside, receiving 1u PRBC due to H/H downtrend, sputum culture with Pseudomonas, ICU and ID recs noted, continues to be intubated     03/11/2025: continues in MICU, intubated and sedated, noted counts, without signs of bleeding, appropriate response to transfusion    03/12/2025: continues in MICU, intubated and sedated, without signs of bleeding     03/13/2025: continues in MICU, s/p Tracheostomy 03/12/2025 with tracheal intubation, continues with sedation     03/14/2025:  awake and alert, mental status much improved, transferred from MICU to [address] [place] [address] 03/13/2025. Without significant events overnight, discussed with nursing staff at bedside, stated pt did well overnight, no signs of bleeding, no fever noted, medications provided via NGT and he is pending PEG-Tube placement today. His case is complex, noted with repeat need for intubations, discussed with pt, he is aware. Discussed with pts primary Oncologist as well.    03/15/2025: awake and alert, no overnight events noted, discussed with nursing staff, he did well overnight, no fevers, no signs of bleeding endorsed. He nods to information. Counts reviewed overall stable, discussed with him as well. Had PEG placed 03/14/2025 03/16/2025: no overnight events noted, comfortable overnight, nursing staff at bedside, stated pt did well, no signs of bleeding, had a BM. No fever reported. Counts reviewed, stable at this time         #Acute hypoxic respiratory failure, Acute, Severe   # COVID  - CT noted with bilateral GGO  - ID following  - Pulmonary following  - Antibiotics per primary team/MICU and ID   - Intubated 02/23/2025 AM, MICU   - extubated 03/04/2025  - Pseudomonas from sputum culture   - Re-intubated 03/09/2025 due to increased work of breathing in setting of increased secretions, not protecting airway, tachypnea, hypoxia   - s/p Tracheostomy 03/12/2025    # Thrombocytopenia, Acute, Moderate   - Did occur during most recent admission and improved to normal prior to discharge   - Without signs of bleeding  - Could be multifactorial, possibly due to infection   - Continue to trend  - Transfuse to maintain Plt > 10k unless actively bleeding then maintain > 50k     # Brain lesion, Acute, Severe   - pt with hx of seizures  - MRI brain now with 5.4 mm enhancing lesion in the LEFT middle cerebellar peduncle with associated edema suspicious for metastases  - MRI Lumbar negative for acute disease  - Small lesion without mass effect or edema, recommended to correlate per neurology if foci and locus are concordant with seizure activity  - Neurology recs noted, new lesion not likely to cause seizure  - It is rare, but nonetheless not impossible, for testicular cancer to be metastatic to the brain  - He will need another PET-CT, can be completed outpatient once stable if concern can proceed with biopsy at that time   - Previous endocrinology eval for thyroid nodule noted  - AFP/BHCH normal,  previously   - Repeat CTH without acute intracranial pathology     # 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 Ovalles, agree with current plan  - Follow up as outpatient with Franki Ovalles MD     # Acute kidney injury on CKD Stage IIIA, Acute, Moderate   - Likely multifactorial  - Nephrology consult and recs noted  - On lovenox for DVT PPx, monitor renal function   - Continue to trend     # Normocytic anemia, Acute, Severe   - Chronic, has hx of PRBC during chemo 07/2024  - Noted Anti E, Anti-K Anti-C antibodies previously  - direct josefina IgG positive, eluate negative, not likely to be clinically significant   - s/p 2u PRBC 03/03/2025 and 03/10/2025   - Monitor for bleeding  - Recommend to transfuse to maintain Hb > 7    # Klebsiella UTI, Acute, Severe > Moderate   # Pseudomonas UTI   -Antibiotics per ID and primary team       Recommendations  - Trend CBC daily, obtain post transfusion CBC today   - Transfuse to maintain Hb > 7   - Transfuse to maintain Plt >10k unless active bleeding then >50k   - On lovenox for DVT PPx, monitor renal function   - Cardiology follow up to address therapeutic anticoagulation, was on heparin drip         Thank you for allowing me to participate in the care of Mr. Gr please do not hesitate to call or text me if you have further questions or concerns.     Bryaan Mart MD  [facility] [place]   Division of Hematology/Oncology  [address] [place] [address], [address]  [place] [address]  P: [phone]  F: [phone]    Attestation:    ----Pt evaluated, >50min spent including face-to-face interaction in addition to chart review, reviewing treatment plan, discussing with care staff in hospital, his outside physician, and managing the patient’s chronic diagnoses as listed in the assessment----        Mr. Gr is a 69 year old male with PMHx of seizure disorder, sleep apnea, HTN, chronic idiopathic constipation, stage III CKD, severe obesity, secondary hyperparathyroidism, anemia, thrombocytopenia, hyperlipidemia, testicular cancer under treatment with Dr. Ovalles who is admitted for acute hypoxic respiratory failure secondary to COVID vs superimposed pneumonia with new onset thrombocytopenia. He was recently discharged 02/06/2025 after a tenous stay including intubation in the ICU for respiratory failure in setting of seizure. He had recovered and was discharged to rehab.      Former smoker, quit 14y prior, denied ETOH, drug use. Lives at home. Does not have children. Denies family history of blood disorders or malignancy.  Denies previous workplace related exposures.  Denies previous history of blood transfusions.  Denied history of thromboses.  Denied history of  requiring anticoagulation.     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  Margins were negative.  pT3Nx. CT C/A/P with few left para-aortic and left iliac chain lymph nodes largest measuring 8.1 cm left para-aortic node, bilateral subcentimeter noncalcified pulmonary nodules measuring up to 7 mm. AFP, LDH, hCG were all elevated. 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 Started on adjuvant therapy with Cisplatin+Etoposide, so far completed 4 cycles of treatment with cisplatin dose reduced 50% and Etoposide 50% due to thrombocytopenia.      02/19/2025: on HFNC, noted tachycardia and fever, Klebsiella in urine as well, thrombocytopenia stable/improving, no signs of active bleeding     02/20/2025: developed A.fib with RVR and was placed on heparin drip and pending cardiology eval    02/21/2025: awake, on AVAPS overnight, noted RRT for hypoxia as pt removed HFNC at some point in time, good response thereafter     02/22/2025:  continues on AVAPS this morning, noted RRT overnight for hypoxia, hypercapnia, ICU following, US LE negative for DVT, counts overall stable/improved     02/23/2025: progressive respiratory failure, noted ongoing RTT this morning with pending intubation and transfer to MICU     02/24/2025: intubated 02/23/2025, sedated, on pressor support, no signs of bleeding, counts noted, no signs of bleeding     02/25/2025: continues in MICU, intubated and sedated, no signs of bleeding    02/26/2025: continues in MICU, intubated, without signs of bleeding, continues on heparin drip     02/27/2025:  remains under the care of the ICU, intubated, no signs of bleeding and continues on heparin drip, counts stable, has not required PRBC support this admission    02/28/2025: continues under the care of MICU, continues intubated, no signs of bleeding, on heparin drip    03/01/2025: continues in MICU, intubated, direct josefina IgG positive, eluate negative, not likely to be clinically significant     03/02/2025:  continues in MICU, nursing staff at bedside, no overnight events noted. CTH without acute intracranial pathology     03/03/2025: continues in MICU, intubated, on heparin drip, 1u PRBC overnight, no signs of bleeding, platelet count stable     03/04/2025: awake, moving arms spontaneously on exam, continues without much interaction however. No signs of bleeding, continues heparin drip, continues intubated in MICU     03/05/2025: extubated 03/04/2025, continues in MICU, awake and alert this morning and able to speak full sentences, discussed/reviewed findings, continues on heparin drip     03/06/2025: nursing staff at bedside, bipap overnight, without signs of bleeding, counts noted stable, renal function improving     03/07/2025:  no signs of bleeding, counts noted, continues heparin drip, continues in MICU    03/08/2025: on HFNC, no signs of bleeding, although alert and answering questions, not back to his baseline yet, continues in MICU    03/09/2025: continues in MICU, s/p re-intubation 03/08/2025 given respiratory compromise in the setting of copious secretions as evidenced by bronchoscopy, in setting of fever, now sedated, mild crusting of blood around mouth, without active sign of bleeding, counts noted reviewed, continues on heparin drip     03/10/2025: continues in MICU, no signs of bleeding, discussed with nursing staff at bedside, receiving 1u PRBC due to H/H downtrend, sputum culture with Pseudomonas, ICU and ID recs noted, continues to be intubated     03/11/2025: continues in MICU, intubated and sedated, noted counts, without signs of bleeding, appropriate response to transfusion    03/12/2025: continues in MICU, intubated and sedated, without signs of bleeding     03/13/2025: continues in MICU, s/p Tracheostomy 03/12/2025 with tracheal intubation, continues with sedation     03/14/2025:  awake and alert, mental status much improved, transferred from MICU to [address] [place] [address] 03/13/2025. Without significant events overnight, discussed with nursing staff at bedside, stated pt did well overnight, no signs of bleeding, no fever noted, medications provided via NGT and he is pending PEG-Tube placement today. His case is complex, noted with repeat need for intubations, discussed with pt, he is aware. Discussed with pts primary Oncologist as well.    03/15/2025: awake and alert, no overnight events noted, discussed with nursing staff, he did well overnight, no fevers, no signs of bleeding endorsed. He nods to information. Counts reviewed overall stable, discussed with him as well. Had PEG placed 03/14/2025 03/16/2025: no overnight events noted, comfortable overnight, nursing staff at bedside, stated pt did well, no signs of bleeding, had a BM. No fever reported. Counts reviewed, stable at this time     03/17/2025: no overnight events noted however continues with need for suctioning due to increase secretions, discussed with overnight staff, mild bleeding likely due to trauma from suctioning and recent tracheostomy placement without evidence of active bleeding otherwise. No fevers noted overnight. Had multiple smaller BMs, without evidence of bleeding, Nephrology recs and reviewed noted for Epo        #Acute hypoxic respiratory failure, Acute, Severe   # COVID  - CT noted with bilateral GGO  - ID following  - Pulmonary following  - Antibiotics per primary team/MICU and ID   - Intubated 02/23/2025 AM, MICU   - extubated 03/04/2025  - Pseudomonas from sputum culture   - Re-intubated 03/09/2025 due to increased work of breathing in setting of increased secretions, not protecting airway, tachypnea, hypoxia   - s/p Tracheostomy 03/12/2025    # Thrombocytopenia, Acute, Moderate   - Did occur during most recent admission and improved to normal prior to discharge   - Without signs of bleeding  - Could be multifactorial, possibly due to infection   - Continue to trend  - Transfuse to maintain Plt > 10k unless actively bleeding then maintain > 50k     # Brain lesion, Acute, Severe   - pt with hx of seizures  - MRI brain now with 5.4 mm enhancing lesion in the LEFT middle cerebellar peduncle with associated edema suspicious for metastases  - MRI Lumbar negative for acute disease  - Small lesion without mass effect or edema, recommended to correlate per neurology if foci and locus are concordant with seizure activity  - Neurology recs noted, new lesion not likely to cause seizure  - It is rare, but nonetheless not impossible, for testicular cancer to be metastatic to the brain  - He will need another PET-CT, can be completed outpatient once stable if concern can proceed with biopsy at that time   - Previous endocrinology eval for thyroid nodule noted  - AFP/BHCH normal,  previously   - Repeat CTH without acute intracranial pathology     # 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 Ovalles, agree with current plan  - Follow up as outpatient with Franki Ovalles MD     # Acute kidney injury on CKD Stage IIIA, Acute, Moderate   - Likely multifactorial  - Nephrology consult and recs noted  - On lovenox for DVT PPx, monitor renal function   - Continue to trend     # Normocytic anemia, Acute, Severe   - Chronic, has hx of PRBC during chemo 07/2024  - Noted Anti E, Anti-K Anti-C antibodies previously  - direct josefina IgG positive, eluate negative, not likely to be clinically significant   - s/p 2u PRBC 03/03/2025 and 03/10/2025   - Monitor for bleeding  - Epo per Nephrology   - Recommend to transfuse to maintain Hb > 7    # Klebsiella UTI, Acute, Severe > Moderate   # Pseudomonas UTI   -Antibiotics per ID and primary team       Recommendations  - Trend CBC daily, obtain post transfusion CBC today   - Transfuse to maintain Hb > 7   - Transfuse to maintain Plt >10k unless active bleeding then >50k   - On lovenox for DVT PPx, monitor renal function   - Cardiology follow up to address therapeutic anticoagulation, was on heparin drip         Thank you for allowing me to participate in the care of Mr. Gr please do not hesitate to call or text me if you have further questions or concerns.     Brayan Mart MD  [facility] [place]   Division of Hematology/Oncology  [address] [place] [address], [address]  [place] [address]  P: [phone]  F: [phone]    Attestation:    ----Pt evaluated, >50min spent including face-to-face interaction in addition to chart review, reviewing treatment plan, discussing with care staff in hospital, his outside physician, and managing the patient’s chronic diagnoses as listed in the assessment----

## 2025-03-17 NOTE — SPEAKING VALVE EVALUATION - RECOMMENDATIONS
Pt is cleared for speaking valve use during waking hours only.  Placement of Passy-Barron Speaking Valve, as tolerated, during waking hours. During the first 1-2 days, the patient should be supervised during use.   Guidelines for valve usage as follows:  1) Cuff fully deflated   2) Do not place valve if patient with baseline RD and/or thick/copious secretions  3) Remove valve if: SpO2 <92%, HR/RR 1.5 x norm, pt with increased work of breathing , patient with subjective complaints, evidence of airtrapping  4) Remove while asleep and for aerosolized respiratory treatments

## 2025-03-17 NOTE — ADVANCED PRACTICE NURSE CONSULT - REASON FOR CONSULT
Requested by staff to assess skin status: occiput. PMH is noted:    69 year old male with a past medical history of hypertension, hyperlipemia VAZQUEZ (on CPAP at bedtime, NC 2 liters during the day), CKD stage 3, epilepsy, schizophrenia, developmental delay, metastatic testicular cancer (s/p orchiectomy, actively on chemotherapy, last dose of etoposide/cisplatin on 6/2024), presenting with acute on chronic hypoxemic respiratory failure, secondary to (a) COVID-19 infection, (b) superimposed pneumonia after recent influenza infection,  Transferred to MICU on 2/23 after RRT on floors due to hypoxia even with max setting AVAPS. Patient started on midodrine and weaned off of levo on 2/24. Failed pressure support on 2/25 and started diuresis with Bumex. CTH was unremarkable and weaned off of Precedex on 3/1. On 3/4 patient extubated and GOC conversations with family revealed that they would want patient to be trached. On 3/7, patient found to have RLL consolidation on POCUS and started on Zosyn, intubated on 3/8 due to increased lethargy and inability to clear oral secretions. Trach placed on 3/12. Transferred to RCU on 3/13. S/p Peg 3/14.  EGD with normal findings.     3/16: Did well overnight on continuous PSV 6/6, Tolerated TC during all day yesterday. Will attempt TC ATC 1st night tonight and f/u AM ABG. CR continues to downtrend (2.01), peaked to 4.34 on 3/3. Pseudomonas PNA on + Bal Cx/ recurrent PSA UTI- completed Levaquin 3/15.  Prophylactic AC started 3/15, stable H/H and platelets.

## 2025-03-17 NOTE — PROGRESS NOTE ADULT - PROBLEM SELECTOR PLAN 6
Chronic heart failure with preserved ejection fraction.   ·  Plan: TTE done here 1/17/25 technically difficult, but LV systolic fxn appears nl without any regional wall motion abnormalities  - Repeat TTE ordered 3/15  - BNP 5000 <---1100 but appears euvolemic   - s/p bumex gtt per ICU  - Appears euvolemic on exam

## 2025-03-17 NOTE — PROGRESS NOTE ADULT - NS ATTEND AMEND GEN_ALL_CORE FT
70 yo M with a h/o metastatic testicular cancer, epilepsy on AEDs, chronic respiratory failure with hypoxia and hypercapnia on home O2, VAZQUEZ on CPAP, HFpEF admitted for acute on chronic respiratory failure with hypoxia and hypercapnia likely secondary to combination of COVID PNA, bacterial superinfection, and acute pulmonary edema due to acute on chronic HFpEF. Hypotension likely severe sepsis with septic shock. SHAGUFTA likely ATN +/- venous congestion. Repeat CT chest with bilateral GGOs possibly acute pulmonary edema vs early fibrosis vs residual COVID along with L basilar consolidation likely bacterial PNA vs atelectasis. Now s/p trach and PEG.    # Acute on chronic respiratory failure with hypoxia and hypercapnia  # COVID PNA  # Bacterial PNA  # Acute pulmonary edema  # Acute on chronic HFpEF  # Hypotension  # Severe sepsis with septic shock  # SHAGUFTA  # Hypernatremia  # Epilepsy    - Continue AEDs  - Tolerating TC x24 hours, ABG acceptable. c/w TC  - Trend Cr, avoid nephrotoxins  - Abx as per ID  - Completed course of dexamethasone and remdesivir for COVID  - Hypernatremia resolved  - Therapeutic AC on hold for Afib due to prior hgb drops and ?metastatic brain lesion, but H/H now stable. Currently on Lovenox ppx and will cont to monitor hgb  -OOB to chair, PT  - Full code

## 2025-03-17 NOTE — SPEECH LANGUAGE PATHOLOGY EVALUATION - SPECIFY REASON(S)
To instrumentally assess swallow function, r/o dysphagia to subjectively assess speech and language: PMV evaluation

## 2025-03-17 NOTE — PROGRESS NOTE ADULT - ASSESSMENT
69 year old male with a past medical history of hypertension, hyperlipemia VAZQUEZ (on CPAP at bedtime, NC 2 liters during the day), CKD stage 3, epilepsy, schizophrenia, developmental delay, metastatic testicular cancer (s/p orchiectomy, actively on chemotherapy, last dose of etoposide/cisplatin on 6/2024) presenting with worsening shortness of breath. Patient was recently hospitalized at [facility] from January 27th 2025 to February 6th 2025 for seizure and influenza virus infection, which required intubation. He was sent to rehab (originally from home) from hospital. He returns due to sudden onset shortness of breath and worsening oxygenation. Of note, he tested positive for COVID-19       1- SHAGUFTA on CKDIII  2- covid-19  3- anemia  4- hypotension  5- respiratory failure         SHAGUFTA suspected in setting of new infection. covid 19   creatinine is improving  bp is in range   hyperphosphatemia   renvela 1600 mg tid  anemia, trend hb ---> retacrit [id number] U tiw sq  cont trach collar   strict I/O  trend creatinine and electroytes daily

## 2025-03-18 LAB
ANION GAP SERPL CALC-SCNC: 10 MMOL/L — SIGNIFICANT CHANGE UP (ref 5–17)
BUN SERPL-MCNC: 23 MG/DL — SIGNIFICANT CHANGE UP (ref 7–23)
CALCIUM SERPL-MCNC: 9.5 MG/DL — SIGNIFICANT CHANGE UP (ref 8.4–10.5)
CHLORIDE SERPL-SCNC: 107 MMOL/L — SIGNIFICANT CHANGE UP (ref 96–108)
CO2 SERPL-SCNC: 24 MMOL/L — SIGNIFICANT CHANGE UP (ref 22–31)
CREAT SERPL-MCNC: 1.67 MG/DL — HIGH (ref 0.5–1.3)
EGFR: 45 ML/MIN/1.73M2 — LOW
EGFR: 45 ML/MIN/1.73M2 — LOW
GAS PNL BLDA: SIGNIFICANT CHANGE UP
GLUCOSE BLDC GLUCOMTR-MCNC: 110 MG/DL — HIGH (ref 70–99)
GLUCOSE BLDC GLUCOMTR-MCNC: 115 MG/DL — HIGH (ref 70–99)
GLUCOSE BLDC GLUCOMTR-MCNC: 126 MG/DL — HIGH (ref 70–99)
GLUCOSE BLDC GLUCOMTR-MCNC: 152 MG/DL — HIGH (ref 70–99)
GLUCOSE SERPL-MCNC: 97 MG/DL — SIGNIFICANT CHANGE UP (ref 70–99)
HCT VFR BLD CALC: 26.9 % — LOW (ref 39–50)
HGB BLD-MCNC: 8.5 G/DL — LOW (ref 13–17)
MAGNESIUM SERPL-MCNC: 2.2 MG/DL — SIGNIFICANT CHANGE UP (ref 1.6–2.6)
MCHC RBC-ENTMCNC: 31.3 PG — SIGNIFICANT CHANGE UP (ref 27–34)
MCHC RBC-ENTMCNC: 31.6 G/DL — LOW (ref 32–36)
MCV RBC AUTO: 98.9 FL — SIGNIFICANT CHANGE UP (ref 80–100)
NRBC BLD AUTO-RTO: 0 /100 WBCS — SIGNIFICANT CHANGE UP (ref 0–0)
PHOSPHATE SERPL-MCNC: 2.5 MG/DL — SIGNIFICANT CHANGE UP (ref 2.5–4.5)
PLATELET # BLD AUTO: 180 K/UL — SIGNIFICANT CHANGE UP (ref 150–400)
POTASSIUM SERPL-MCNC: 3.6 MMOL/L — SIGNIFICANT CHANGE UP (ref 3.5–5.3)
POTASSIUM SERPL-SCNC: 3.6 MMOL/L — SIGNIFICANT CHANGE UP (ref 3.5–5.3)
RBC # BLD: 2.72 M/UL — LOW (ref 4.2–5.8)
RBC # FLD: 16.5 % — HIGH (ref 10.3–14.5)
SODIUM SERPL-SCNC: 141 MMOL/L — SIGNIFICANT CHANGE UP (ref 135–145)
WBC # BLD: 7.13 K/UL — SIGNIFICANT CHANGE UP (ref 3.8–10.5)
WBC # FLD AUTO: 7.13 K/UL — SIGNIFICANT CHANGE UP (ref 3.8–10.5)

## 2025-03-18 PROCEDURE — 99222 1ST HOSP IP/OBS MODERATE 55: CPT

## 2025-03-18 RX ORDER — APIXABAN 2.5 MG/1
5 TABLET, FILM COATED ORAL EVERY 12 HOURS
Refills: 0 | Status: DISCONTINUED | OUTPATIENT
Start: 2025-03-18 | End: 2025-03-28

## 2025-03-18 RX ORDER — METOPROLOL SUCCINATE 50 MG/1
25 TABLET, EXTENDED RELEASE ORAL EVERY 12 HOURS
Refills: 0 | Status: DISCONTINUED | OUTPATIENT
Start: 2025-03-18 | End: 2025-03-28

## 2025-03-18 RX ADMIN — SEVELAMER HYDROCHLORIDE 1600 MILLIGRAM(S): 800 TABLET ORAL at 05:11

## 2025-03-18 RX ADMIN — GABAPENTIN 150 MILLIGRAM(S): 400 CAPSULE ORAL at 17:17

## 2025-03-18 RX ADMIN — LACOSAMIDE 140 MILLIGRAM(S): 150 TABLET, FILM COATED ORAL at 05:08

## 2025-03-18 RX ADMIN — SEVELAMER HYDROCHLORIDE 1600 MILLIGRAM(S): 800 TABLET ORAL at 13:04

## 2025-03-18 RX ADMIN — ESCITALOPRAM OXALATE 15 MILLIGRAM(S): 20 TABLET ORAL at 05:07

## 2025-03-18 RX ADMIN — NYSTATIN 1 APPLICATION(S): 100000 CREAM TOPICAL at 16:10

## 2025-03-18 RX ADMIN — LACOSAMIDE 140 MILLIGRAM(S): 150 TABLET, FILM COATED ORAL at 17:17

## 2025-03-18 RX ADMIN — IPRATROPIUM BROMIDE AND ALBUTEROL SULFATE 3 MILLILITER(S): .5; 2.5 SOLUTION RESPIRATORY (INHALATION) at 23:42

## 2025-03-18 RX ADMIN — ATORVASTATIN CALCIUM 20 MILLIGRAM(S): 80 TABLET, FILM COATED ORAL at 21:59

## 2025-03-18 RX ADMIN — Medication 650 MILLIGRAM(S): at 13:34

## 2025-03-18 RX ADMIN — Medication 650 MILLIGRAM(S): at 13:04

## 2025-03-18 RX ADMIN — IPRATROPIUM BROMIDE AND ALBUTEROL SULFATE 3 MILLILITER(S): .5; 2.5 SOLUTION RESPIRATORY (INHALATION) at 05:36

## 2025-03-18 RX ADMIN — LEVETIRACETAM 400 MILLIGRAM(S): 10 INJECTION, SOLUTION INTRAVENOUS at 05:09

## 2025-03-18 RX ADMIN — METOPROLOL SUCCINATE 25 MILLIGRAM(S): 50 TABLET, EXTENDED RELEASE ORAL at 16:00

## 2025-03-18 RX ADMIN — GABAPENTIN 150 MILLIGRAM(S): 400 CAPSULE ORAL at 05:07

## 2025-03-18 RX ADMIN — LURASIDONE HYDROCHLORIDE 20 MILLIGRAM(S): 120 TABLET, FILM COATED ORAL at 21:59

## 2025-03-18 RX ADMIN — Medication 1 APPLICATION(S): at 05:10

## 2025-03-18 RX ADMIN — LAMOTRIGINE 50 MILLIGRAM(S): 150 TABLET ORAL at 05:07

## 2025-03-18 RX ADMIN — IPRATROPIUM BROMIDE AND ALBUTEROL SULFATE 3 MILLILITER(S): .5; 2.5 SOLUTION RESPIRATORY (INHALATION) at 11:35

## 2025-03-18 RX ADMIN — LAMOTRIGINE 50 MILLIGRAM(S): 150 TABLET ORAL at 17:17

## 2025-03-18 RX ADMIN — APIXABAN 5 MILLIGRAM(S): 2.5 TABLET, FILM COATED ORAL at 17:23

## 2025-03-18 RX ADMIN — NYSTATIN 1 APPLICATION(S): 100000 CREAM TOPICAL at 05:10

## 2025-03-18 RX ADMIN — EPOETIN ALFA 10000 UNIT(S): 10000 SOLUTION INTRAVENOUS; SUBCUTANEOUS at 13:18

## 2025-03-18 RX ADMIN — Medication 15 MILLILITER(S): at 05:11

## 2025-03-18 RX ADMIN — LEVETIRACETAM 400 MILLIGRAM(S): 10 INJECTION, SOLUTION INTRAVENOUS at 17:17

## 2025-03-18 RX ADMIN — Medication 1 APPLICATION(S): at 11:33

## 2025-03-18 RX ADMIN — IPRATROPIUM BROMIDE AND ALBUTEROL SULFATE 3 MILLILITER(S): .5; 2.5 SOLUTION RESPIRATORY (INHALATION) at 17:43

## 2025-03-18 RX ADMIN — POLYETHYLENE GLYCOL 3350 17 GRAM(S): 17 POWDER, FOR SOLUTION ORAL at 11:31

## 2025-03-18 RX ADMIN — Medication 1000 UNIT(S): at 11:31

## 2025-03-18 RX ADMIN — Medication 40 MILLIGRAM(S): at 11:31

## 2025-03-18 RX ADMIN — NYSTATIN 1 APPLICATION(S): 100000 CREAM TOPICAL at 21:59

## 2025-03-18 NOTE — SWALLOW FEES ASSESSMENT ADULT - ASPIRATION DURING SWALLOW - SILENT
Upon brief view of further down in the subglottic space, there is blue material, suspected to be aspiration during the swallow

## 2025-03-18 NOTE — PROGRESS NOTE ADULT - NS ATTEND AMEND GEN_ALL_CORE FT
Seen, examined with, formulated plan with and  agree with above as scribed by NP Joaquin [Gonzalo]     dionisio   anemia  respiratory failure     cr is improving  retacrit [id number] U tiw   cont trach collar

## 2025-03-18 NOTE — PROGRESS NOTE ADULT - ASSESSMENT
69 year old male with a past medical history of hypertension, hyperlipemia VAZQUEZ (on CPAP at bedtime, NC 2 liters during the day), CKD stage 3, epilepsy, schizophrenia, developmental delay, metastatic testicular cancer (s/p orchiectomy, actively on chemotherapy, last dose of etoposide/cisplatin on 6/2024), presenting with acute on chronic hypoxemic respiratory failure, secondary to (a) COVID-19 infection, (b) superimposed pneumonia after recent influenza infection,  Transferred to MICU on 2/23 after RRT on floors due to hypoxia even with max setting AVAPS. Patient started on midodrine and weaned off of levo on 2/24. Failed pressure support on 2/25 and started diuresis with Bumex. CTH was unremarkable and weaned off of Precedex on 3/1. On 3/4 patient extubated and C conversations with family revealed that they would want patient to be trached. On 3/7, patient found to have RLL consolidation on POCUS and started on Zosyn, intubated on 3/8 due to increased lethargy and inability to clear oral secretions. Trach placed on 3/12. Transferred to RCU on 3/13. S/p Peg 3/14.  EGD with normal findings.       3/18: No events reported overnight, remains on TC ATC since 3/13 with normal ABG; vent discontinued this morning. TTE performed yesterday no obvious sign of left atrial appendage. Creatinine continues to downtrend today 1.67. Patient evaluated by Cardiology recommended Lopressor 25 mg BID for rate control for AFIB and Eliquis 5 mg bid ( cleared with heme/onc). Patient continues to tolerate speaking valve, FEES performed again this afternoon not cleared for enteral diet but speech will continue to work with patient for therapeutic swallowing exercises. Patient evaluated by Physiatry ongoing evaluation to determine ROCHELLE vs Acute rehab upon discharge.

## 2025-03-18 NOTE — PROGRESS NOTE ADULT - SUBJECTIVE AND OBJECTIVE BOX
Patient is a 69y old  Male who presents with a chief complaint of Acute on chronic hypoxemic respiratory failure (18 Mar 2025 04:55)      Interval Events: No events reported overnight     REVIEW OF SYSTEMS:  [ ] Positive  [ ] All other systems negative  [ ] Unable to assess ROS because ________    Vital Signs Last 24 Hrs  T(C): 37.2 (03-18-25 @ 05:36), Max: 37.2 (03-17-25 @ 11:15)  T(F): 99 (03-18-25 @ 05:36), Max: 99 (03-18-25 @ 05:36)  HR: 91 (03-18-25 @ 05:36) (88 - 97)  BP: 154/93 (03-18-25 @ 05:36) (145/78 - 154/93)  RR: 20 (03-18-25 @ 05:36) (18 - 20)  SpO2: 98% (03-18-25 @ 05:36) (96% - 100%)    PHYSICAL EXAM:  HEENT:   [ ]Tracheostomy:  [ ]Pupils equal  [ ]No oral lesions  [ ]Abnormal    SKIN  [ ]No Rash  [ ] Abnormal  [ ] pressure    CARDIAC  [ ]Regular  [ ]Abnormal    PULMONARY  [ ]Bilateral Clear Breath Sounds  [ ]Normal Excursion  [ ]Abnormal    GI  [ ]PEG      [ ] +BS		              [ ]Soft, nondistended, nontender	  [ ]Abnormal    MUSCULOSKELETAL                                   [ ]Bedbound                 [ ]Abnormal    [ ]Ambulatory/OOB to chair                           EXTREMITIES                                         [ ]Normal  [ ]Edema                           NEUROLOGIC  [ ] Normal, non focal  [ ] Focal findings:    PSYCHIATRIC  [ ]Alert and appropriate  [ ] Sedated	 [ ]Agitated    :  [place]: [ ] Yes, if yes: Date of Placement:                   [  ] No    LINES: Central Lines [ ] Yes, if yes: Date of Placement                                     [  ] No    HOSPITAL MEDICATIONS:  MEDICATIONS  (STANDING):  albuterol/ipratropium for Nebulization 3 milliLiter(s) Nebulizer every 6 hours  atorvastatin 20 milliGRAM(s) Oral at bedtime  chlorhexidine 0.12% Liquid 15 milliLiter(s) Oral Mucosa every 12 hours  chlorhexidine 2% Cloths 1 Application(s) Topical <User Schedule>  cholecalciferol 1000 Unit(s) Oral daily  enoxaparin Injectable 40 milliGRAM(s) SubCutaneous every 24 hours  epoetin krystyna-epbx (RETACRIT) Injectable 16759 Unit(s) SubCutaneous <User Schedule>  escitalopram 15 milliGRAM(s) Oral with breakfast  gabapentin Solution 150 milliGRAM(s) Oral every 12 hours  influenza  Vaccine (HIGH DOSE) 0.5 milliLiter(s) IntraMuscular once  lacosamide IVPB 200 milliGRAM(s) IV Intermittent every 12 hours  lamoTRIgine 50 milliGRAM(s) Oral two times a day  lanolin Ointment 1 Application(s) Topical daily  levETIRAcetam  IVPB 750 milliGRAM(s) IV Intermittent every 12 hours  lurasidone 20 milliGRAM(s) Oral at bedtime  nystatin Powder 1 Application(s) Topical every 8 hours  pantoprazole  Injectable 40 milliGRAM(s) IV Push daily  polyethylene glycol 3350 17 Gram(s) Oral daily  sevelamer carbonate Powder 1600 milliGRAM(s) Enteral Tube every 8 hours    MEDICATIONS  (PRN):  acetaminophen     Tablet .. 650 milliGRAM(s) Oral every 6 hours PRN Temp greater or equal to 38C (100.4F), Mild Pain (1 - 3)  aluminum hydroxide/magnesium hydroxide/simethicone Suspension 30 milliLiter(s) Oral every 4 hours PRN Dyspepsia  artificial tears (preservative free) Ophthalmic Solution 1 Drop(s) Both EYES every 6 hours PRN Dry Eyes  melatonin 3 milliGRAM(s) Oral at bedtime PRN Insomnia  ondansetron Injectable 4 milliGRAM(s) IV Push every 8 hours PRN Nausea and/or Vomiting      LABS:                        8.5    7.56  )-----------( 165      ( 17 Mar 2025 06:35 )             26.7     03-17    142  |  106  |  26[H]  ----------------------------<  95  3.5   |  23  |  1.73[H]    Ca    9.6      17 Mar 2025 06:35  Phos  2.8     03-17  Mg     2.2     03-17        Urinalysis Basic - ( 17 Mar 2025 06:35 )    Color: x / Appearance: x / SG: x / pH: x  Gluc: 95 mg/dL / Ketone: x  / Bili: x / Urobili: x   Blood: x / Protein: x / Nitrite: x   Leuk Esterase: x / RBC: x / WBC x   Sq Epi: x / Non Sq Epi: x / Bacteria: x      Arterial Blood Gas:  03-18 @ 05:47  7.36/46/118/26/100.0/0.3  ABG lactate: --  Arterial Blood Gas:  03-17 @ 11:00  7.37/43/140/25/99.0/-0.6  ABG lactate: --      CAPILLARY BLOOD GLUCOSE    MICROBIOLOGY:     RADIOLOGY:  [ ] Reviewed and interpreted by me    Mode: OFf  FiO2: 30   Patient is a 69y old  Male who presents with a chief complaint of Acute on chronic hypoxemic respiratory failure (18 Mar 2025 04:55)      Interval Events: No events reported overnight     REVIEW OF SYSTEMS:  [ ] Positive  [x] All other systems negative  [ ] Unable to assess ROS because _____    Vital Signs Last 24 Hrs  T(C): 37.2 (03-18-25 @ 05:36), Max: 37.2 (03-17-25 @ 11:15)  T(F): 99 (03-18-25 @ 05:36), Max: 99 (03-18-25 @ 05:36)  HR: 91 (03-18-25 @ 05:36) (88 - 97)  BP: 154/93 (03-18-25 @ 05:36) (145/78 - 154/93)  RR: 20 (03-18-25 @ 05:36) (18 - 20)  SpO2: 98% (03-18-25 @ 05:36) (96% - 100%)    PHYSICAL EXAM:  HEENT:   [X]Tracheostomy: #7 cuffed portex with PMV   [X]Pupils equal  [ ]No oral lesions  [ ]Abnormal    SKIN  [ ]No Rash  [X] Abnormal: macerated skin noted on perineum, buttocks, and inner thighs; Lt occipital scalp injury   [ ] pressure    CARDIAC  [X]Regular  [ ]Abnormal    PULMONARY  [X]Bilateral Clear Breath Sounds  [ ]Normal Excursion  [ ]Abnormal    GI  [X]PEG      [X] +BS		              [X]Soft, nondistended, nontender	  [ ]Abnormal    MUSCULOSKELETAL                                   [X]Bedbound                 [ ]Abnormal    [ ]Ambulatory/OOB to chair                           EXTREMITIES                                         [X]Normal  [ ]Edema                           NEUROLOGIC  [X] Normal, non focal  [ ] Focal findings:    PSYCHIATRIC  [X]Alert and appropriate  [ ] Sedated	 [ ]Agitated    :  [place]: [ ] Yes, if yes: Date of Placement:                   [X] No    LINES: Central Lines [ ] Yes, if yes: Date of Placement                                     [X] No      HOSPITAL MEDICATIONS:  MEDICATIONS  (STANDING):  albuterol/ipratropium for Nebulization 3 milliLiter(s) Nebulizer every 6 hours  atorvastatin 20 milliGRAM(s) Oral at bedtime  chlorhexidine 0.12% Liquid 15 milliLiter(s) Oral Mucosa every 12 hours  chlorhexidine 2% Cloths 1 Application(s) Topical <User Schedule>  cholecalciferol 1000 Unit(s) Oral daily  enoxaparin Injectable 40 milliGRAM(s) SubCutaneous every 24 hours  epoetin krystyna-epbx (RETACRIT) Injectable 37841 Unit(s) SubCutaneous <User Schedule>  escitalopram 15 milliGRAM(s) Oral with breakfast  gabapentin Solution 150 milliGRAM(s) Oral every 12 hours  influenza  Vaccine (HIGH DOSE) 0.5 milliLiter(s) IntraMuscular once  lacosamide IVPB 200 milliGRAM(s) IV Intermittent every 12 hours  lamoTRIgine 50 milliGRAM(s) Oral two times a day  lanolin Ointment 1 Application(s) Topical daily  levETIRAcetam  IVPB 750 milliGRAM(s) IV Intermittent every 12 hours  lurasidone 20 milliGRAM(s) Oral at bedtime  nystatin Powder 1 Application(s) Topical every 8 hours  pantoprazole  Injectable 40 milliGRAM(s) IV Push daily  polyethylene glycol 3350 17 Gram(s) Oral daily  sevelamer carbonate Powder 1600 milliGRAM(s) Enteral Tube every 8 hours    MEDICATIONS  (PRN):  acetaminophen     Tablet .. 650 milliGRAM(s) Oral every 6 hours PRN Temp greater or equal to 38C (100.4F), Mild Pain (1 - 3)  aluminum hydroxide/magnesium hydroxide/simethicone Suspension 30 milliLiter(s) Oral every 4 hours PRN Dyspepsia  artificial tears (preservative free) Ophthalmic Solution 1 Drop(s) Both EYES every 6 hours PRN Dry Eyes  melatonin 3 milliGRAM(s) Oral at bedtime PRN Insomnia  ondansetron Injectable 4 milliGRAM(s) IV Push every 8 hours PRN Nausea and/or Vomiting      LABS:                        8.5    7.56  )-----------( 165      ( 17 Mar 2025 06:35 )             26.7     03-17    142  |  106  |  26[H]  ----------------------------<  95  3.5   |  23  |  1.73[H]    Ca    9.6      17 Mar 2025 06:35  Phos  2.8     03-17  Mg     2.2     03-17        Urinalysis Basic - ( 17 Mar 2025 06:35 )    Color: x / Appearance: x / SG: x / pH: x  Gluc: 95 mg/dL / Ketone: x  / Bili: x / Urobili: x   Blood: x / Protein: x / Nitrite: x   Leuk Esterase: x / RBC: x / WBC x   Sq Epi: x / Non Sq Epi: x / Bacteria: x      Arterial Blood Gas:  03-18 @ 05:47  7.36/46/118/26/100.0/0.3  ABG lactate: --  Arterial Blood Gas:  03-17 @ 11:00  7.37/43/140/25/99.0/-0.6  ABG lactate: --      CAPILLARY BLOOD GLUCOSE    MICROBIOLOGY:     RADIOLOGY:  [ ] Reviewed and interpreted by me    Mode: OFf  FiO2: 30

## 2025-03-18 NOTE — CONSULT NOTE ADULT - REASON FOR ADMISSION
Acute on chronic hypoxemic respiratory failure

## 2025-03-18 NOTE — SWALLOW FEES ASSESSMENT ADULT - ASPIRATION PRECAUTIONS
Maintain aspiration precautions for secretions and enteral feeds
Monitor for s/s aspiration/laryngeal penetration. If noted:  D/C p.o. intake, provide non-oral nutrition/hydration/meds, and contact this service @ [phone]

## 2025-03-18 NOTE — CONSULT NOTE ADULT - CONSULT REQUESTED DATE/TIME
12-Mar-2025 12:22
17-Feb-2025 09:53
18-Feb-2025
18-Feb-2025 04:39
18-Mar-2025 09:42
17-Feb-2025 10:52
17-Feb-2025 08:45
21-Feb-2025 20:25

## 2025-03-18 NOTE — PROGRESS NOTE ADULT - PROBLEM SELECTOR PLAN 5
- Chronic heart failure with preserved ejection fraction.   - TTE 1/17: LV systolic fxn appears nl without any regional wall motion abnormalities  - S/p bumex gtt in MICU  - Appears euvolemic on exam

## 2025-03-18 NOTE — SWALLOW FEES ASSESSMENT ADULT - RECOMMENDED CONSISTENCY
Continue NPO, with non-oral nutrition/hydration/medications.
Puree diet with moderately thick liquids via teaspoon

## 2025-03-18 NOTE — PROGRESS NOTE ADULT - PROBLEM SELECTOR PLAN 1
- Primarily caused by Covid, superimposed by bacterial pneumonia  - Intubated 02/23, extubated 03/04  - Patient with worsening secretions and consolidations on 3/8, requiring re-intubation   - S/p tracheostomy on 3/12 by MICU  - BAL culture 3/8: Pseudomonas  - Txd with Levaquin renally dosed 750 q48h (3/9-3/16)

## 2025-03-18 NOTE — PROGRESS NOTE ADULT - PROBLEM SELECTOR PLAN 3
- Continue with home meds:  - Lacosamide ( Renew 3/19) and Lamotrigine  - Multiple EEG's negative no seizure, recently done 3/4

## 2025-03-18 NOTE — SWALLOW FEES ASSESSMENT ADULT - SLP GENERAL OBSERVATIONS
Pt was encounter sitting upright in bed, on FiO2 40% via trach collar, alert, oriented, self-suctioning secretions. PMV in place throughout assessment. Pt was encounter sitting upright in bed, on FiO2 30% via trach collar, alert, oriented, self-suctioning secretions. PMV in place throughout assessment.

## 2025-03-18 NOTE — CONSULT NOTE ADULT - CONSULT REASON
increased WOB
SOB
COVID
Evaluate Rehabilitation Needs
PEG evaluation
abnormal creatinine
COVID
Testicular cancer, thrombocytopenia

## 2025-03-18 NOTE — PROGRESS NOTE ADULT - SUBJECTIVE AND OBJECTIVE BOX
Occupational Therapy  Daily Treatment     Patient Name: Gaurav Lopez  Age:  75 y.o., Sex:  male  Medical Record #: [id number]  Today's Date: 6/23/2020     Precautions  Precautions: Fall Risk, Cervical Collar  , Spinal / Back Precautions   Comments: LLE prosthesis, pacemaker, orthostatic hypotension , abdominal binder     Safety   ADL Safety : Impaired, Requires Supervision for Safety, Requires Physical Assist for Safety, Impaired Insight into Safety, Requires Cueing for Safety  Bathroom Safety: Impaired, Requires Supervision for Safety, Requires Physical Assist for Safety, Impaired Insight into Safety, Requires Cuing for Safety    Subjective       Objective       06/23/20 1401   Precautions   Precautions Fall Risk;Cervical Collar  ;Spinal / Back Precautions    Comments LLE prosthesis, pacemaker, orthostatic hypotension , abdominal binder    Vitals   O2 Delivery Device None - Room Air   Pain   Intervention Declines   Pain 0 - 10 Group   Therapist Pain Assessment 0   Non Verbal Descriptors   Non Verbal Scale  Calm   Cognition    Level of Consciousness Alert   Sitting Upper Body Exercises   Chest Press 3 sets of 10  (Equalizer, 20#'s, rest break btwn sets)   Bilateral Row 3 sets of 10  (Equalizer, 25#'s, rest break btwn sets)   Bicep Curls 3 sets of 10  (10# wand)   Upper Extremity Bike Level 4 Resistance  (FluidoBike, Level 4, 15 mins, rest break x 4, .950km)   Other Exercise Seted Row, Equalizer, 3x10, 10#'s, rest break btwn sets.   Bed Mobility    Supine to Sit Maximal Assist   Interdisciplinary Plan of Care Collaboration   Patient Position at End of Therapy Seated;Self Releasing Lap Belt Applied;Call Light within Reach;Tray Table within Reach;Phone within Reach   OT Total Time Spent   OT Individual Total Time Spent (Mins) 60   OT Charge Group   OT Therapeutic Exercise  4       Assessment    Pt was alert and cooperative w/ tx.  Tx emphasis on UB TherEx for strength/endurance.  Noted Max assist for bed mobility supine  to EOB sitting.  Frqnt rest breaks throughout session.Additional barriers include decreased functional strength, poor endurance, sitting/standing balance and orthostatic hypotension.  Strengths: Motivated for self care and independence, Pleasant and cooperative, Supportive family  Barriers: Orthostatic hypotension, Hypotension    Plan    OT to address strength, endurance, balance + education for AE/DME to facilitate greater independence w/ ADL's/functional mobility/transfers.    Occupational Therapy Goals     Problem: Dressing     Dates: Start: 06/19/20       Goal: STG-Within one week, patient will dress LB     Dates: Start: 06/19/20       Description: 1) Individualized Goal:  Max assist for LB clothing management via AE/DME PRN  2) Interventions:  OT Self Care/ADL, OT Neuro Re-Ed/Balance, OT Therapeutic Activity, OT Evaluation, and OT Therapeutic Exercise      Note:     Goal Note filed on 06/22/20 1109 by Mesfin Mtz MS,OTR/L    Pt at mod assist via AE/DME                        Problem: OT Long Term Goals     Dates: Start: 06/19/20       Goal: LTG-By discharge, patient will complete basic self care tasks     Dates: Start: 06/19/20       Description: 1) Individualized Goal:  Mod I for BADL's, Sup/SBA for shower via AE/DME PRN  2) Interventions:  OT Self Care/ADL, OT Neuro Re-Ed/Balance, OT Therapeutic Activity, OT Evaluation, and OT Therapeutic Exercise            Goal: LTG-By discharge, patient will perform bathroom transfers     Dates: Start: 06/19/20                   Problem: Toileting     Dates: Start: 06/19/20       Goal: STG-Within one week, patient will complete toileting tasks     Dates: Start: 06/19/20       Description: 1) Individualized Goal:  Max assist for toileting task via AE/DME PRN  2) Interventions:  OT Self Care/ADL, OT Neuro Re-Ed/Balance, OT Therapeutic Activity, OT Evaluation, and OT Therapeutic Exercise      Note:     Goal Note filed on 06/22/20 1109 by Mesfin Mtz MS,OTR/L    Pt at Total  assist for toileting task via DME                             [facility]   [phone]  RENAL FOLLOW UP NOTE  --------------------------------------------------------------------------------  Chief Complaint:    24 hour events/subjective:    patient seen and examined.   appears comfortable    PAST HISTORY  --------------------------------------------------------------------------------  No significant changes to PMH, PSH, FHx, SHx, unless otherwise noted    ALLERGIES & MEDICATIONS  --------------------------------------------------------------------------------  Allergies    seasonal allergies (Unknown)  No Known Drug Allergies    Intolerances    latex (Rash)    Standing Inpatient Medications  albuterol/ipratropium for Nebulization 3 milliLiter(s) Nebulizer every 6 hours  atorvastatin 20 milliGRAM(s) Oral at bedtime  chlorhexidine 2% Cloths 1 Application(s) Topical <User Schedule>  cholecalciferol 1000 Unit(s) Oral daily  enoxaparin Injectable 40 milliGRAM(s) SubCutaneous every 24 hours  epoetin krystyna-epbx (RETACRIT) Injectable 70265 Unit(s) SubCutaneous <User Schedule>  escitalopram 15 milliGRAM(s) Oral with breakfast  gabapentin Solution 150 milliGRAM(s) Oral every 12 hours  influenza  Vaccine (HIGH DOSE) 0.5 milliLiter(s) IntraMuscular once  lacosamide IVPB 200 milliGRAM(s) IV Intermittent every 12 hours  lamoTRIgine 50 milliGRAM(s) Oral two times a day  lanolin Ointment 1 Application(s) Topical daily  levETIRAcetam  IVPB 750 milliGRAM(s) IV Intermittent every 12 hours  lurasidone 20 milliGRAM(s) Oral at bedtime  nystatin Powder 1 Application(s) Topical every 8 hours  pantoprazole  Injectable 40 milliGRAM(s) IV Push daily  polyethylene glycol 3350 17 Gram(s) Oral daily  sevelamer carbonate Powder 1600 milliGRAM(s) Enteral Tube every 8 hours    PRN Inpatient Medications  acetaminophen     Tablet .. 650 milliGRAM(s) Oral every 6 hours PRN  aluminum hydroxide/magnesium hydroxide/simethicone Suspension 30 milliLiter(s) Oral every 4 hours PRN  artificial tears (preservative free) Ophthalmic Solution 1 Drop(s) Both EYES every 6 hours PRN  melatonin 3 milliGRAM(s) Oral at bedtime PRN  ondansetron Injectable 4 milliGRAM(s) IV Push every 8 hours PRN      REVIEW OF SYSTEMS  --------------------------------------------------------------------------------    limited ROS  CVS: denies chest pain  Resp: denies SOB  GI: Denies Abd pain  : Denies dysuria    VITALS/PHYSICAL EXAM  --------------------------------------------------------------------------------  T(C): 37.1 (03-18-25 @ 11:11), Max: 37.2 (03-18-25 @ 05:36)  HR: 97 (03-18-25 @ 11:35) (88 - 97)  BP: 147/88 (03-18-25 @ 11:11) (147/88 - 154/93)  RR: 18 (03-18-25 @ 11:11) (18 - 20)  SpO2: 99% (03-18-25 @ 11:35) (95% - 100%)  Wt(kg): --        03-17-25 @ 07:01  -  03-18-25 @ 07:00  --------------------------------------------------------  IN: 690 mL / OUT: 900 mL / NET: -210 mL      Physical Exam:  	              Gen: ill appearing male, trach collar  	Pulm: decrease bs, +coarse bs    	CV: No JVD. RRR, S1S2; no rub  	Abd: +BS, soft, nondistended, Peg  	UE: Warm, no cyanosis  no clubbing,  no edema;  	LE: Warm, no cyanosis  no clubbing, no edema    LABS/STUDIES  --------------------------------------------------------------------------------              8.5    7.13  >-----------<  180      [03-18-25 @ 07:13]              26.9     141  |  107  |  23  ----------------------------<  97      [03-18-25 @ 07:13]  3.6   |  24  |  1.67        Ca     9.5     [03-18-25 @ 07:13]      Mg     2.2     [03-18-25 @ 07:13]      Phos  2.5     [03-18-25 @ 07:13]            Creatinine Trend:  SCr 1.67 [03-18 @ 07:13]  SCr 1.73 [03-17 @ 06:35]  SCr 2.01 [03-16 @ 09:29]  SCr 2.28 [03-15 @ 09:38]  SCr 2.67 [03-14 @ 07:14]            Ferritin 5943      [02-23-25 @ 06:15]  TSH 0.87      [01-25-25 @ 11:31]

## 2025-03-18 NOTE — PROGRESS NOTE ADULT - PROBLEM SELECTOR PLAN 7
- S/p Peg placement 3/14. EGD with normal findings  - Tolerating feeds at goal rate  - Repeat FEES 3/18: Not cleared for enteral diet  - Speech will continue to follow for therapeutic exercises   - Continue Miralax

## 2025-03-18 NOTE — PROGRESS NOTE ADULT - PROBLEM SELECTOR PLAN 4
- New onset Afib with RVR, EKG 2/19  - Heparin drip stopped initially due to H/H instability while in MICU   - TTE repeated on 3/17: No obvious Left atrial Thrombus   - Lopressor 25 mg q 12 hrs added for rate control   - Eliquis 5 mg q 12 hrs added today as per cardiology ( Cleared by Heme)  - Monitor BP / HR

## 2025-03-18 NOTE — SWALLOW FEES ASSESSMENT ADULT - ASPIRATION AFTER SWALLOW - SILENT
Penetrated material (described below) eventually descends below vocal cords without sensory response. Appears to mostly clear with cued cough./Trace

## 2025-03-18 NOTE — PROGRESS NOTE ADULT - NS ATTEND AMEND GEN_ALL_CORE FT
68 yo M with a h/o metastatic testicular cancer, epilepsy on AEDs, chronic respiratory failure with hypoxia and hypercapnia on home O2, VAZQUEZ on CPAP, HFpEF admitted for acute on chronic respiratory failure with hypoxia and hypercapnia likely secondary to combination of COVID PNA, bacterial superinfection, and acute pulmonary edema due to acute on chronic HFpEF. Hypotension likely severe sepsis with septic shock. SHAGUFTA likely ATN +/- venous congestion. Repeat CT chest with bilateral GGOs possibly acute pulmonary edema vs early fibrosis vs residual COVID along with L basilar consolidation likely bacterial PNA vs atelectasis. Now s/p trach and PEG.    # Acute on chronic respiratory failure with hypoxia and hypercapnia  # COVID PNA  # Bacterial PNA  # Acute pulmonary edema  # Acute on chronic HFpEF  # Hypotension  # Severe sepsis with septic shock  # SHAGUFTA  # Hypernatremia  # Epilepsy    - Continue AEDs  - Tolerating TC and PMV   - Trend Cr, avoid nephrotoxins  - Completed Levaquin course   - Completed course of dexamethasone and remdesivir for COVID  -FEES today  - Hypernatremia resolved  - Therapeutic AC on hold for Afib due to prior acute anemia, but H/H now stable. Currently on Lovenox ppx, will start Eliquis. TTE without evidence of LA thrombus, unable to assess Left atrial appendage  -OOB to chair, PT  - Full code

## 2025-03-18 NOTE — PROGRESS NOTE ADULT - NUTRITIONAL ASSESSMENT
This patient has been assessed with a concern for Malnutrition and has been determined to have a diagnosis/diagnoses of Severe protein-calorie malnutrition.    This patient is being managed with:   Diet NPO with Tube Feed-  Tube Feeding Modality: Gastrostomy  Jevity 1.5 Garth (JEVITY1.5RTH)  Total Volume for 24 Hours (mL): 1440  Continuous  Starting Tube Feed Rate {mL per Hour}: 10  Increase Tube Feed Rate by (mL): 10     Every 4 hours  Until Goal Tube Feed Rate (mL per Hour): 60  Tube Feed Duration (in Hours): 24  Banatrol TF     Qty per Day:  1  Entered: Mar 17 2025  1:30PM

## 2025-03-18 NOTE — PROGRESS NOTE ADULT - SUBJECTIVE AND OBJECTIVE BOX
[facility]  JACINTO Horton Y. Patel, S. Shah, G. Casimir  [phone]  ([phone] - weekdays after 5pm and weekends)    Name: OSCAR MILAN  Age/Gender: 69y Male  MRN: [id number]    Interval History:  Patient seen and examined this morning.   Resting comfortably, denies pain.   Notes reviewed  No concerning overnight events  Afebrile   Allergies: seasonal allergies (Unknown)  No Known Drug Allergies      Objective:  Vitals:   T(F): 99 (03-18-25 @ 05:36), Max: 99 (03-18-25 @ 05:36)  HR: 91 (03-18-25 @ 05:36) (88 - 97)  BP: 154/93 (03-18-25 @ 05:36) (145/78 - 154/93)  RR: 20 (03-18-25 @ 05:36) (18 - 20)  SpO2: 98% (03-18-25 @ 05:36) (96% - 100%)  Physical Examination:  General: no acute distress, nontoxic appearing   HEENT: normocephalic, atraumatic, trach collar  Respiratory: decreased breath sounds b/l   Cardiovascular: S1 and S2 present, normal rate   Gastrointestinal: soft, nontender, nondistended  Extremities: no edema, no cyanosis  Skin: no visible rash    Laboratory Studies:  CBC:                       8.5    7.13  )-----------( 180      ( 18 Mar 2025 07:13 )             26.9     WBC Trend:  7.13 03-18-25 @ 07:13  7.56 03-17-25 @ 06:35  7.61 03-16-25 @ 09:28  7.74 03-15-25 @ 09:38  11.78 03-14-25 @ 07:13  7.78 03-13-25 @ 00:16  6.57 03-12-25 @ 00:26    CMP: 03-18    141  |  107  |  23  ----------------------------<  97  3.6   |  24  |  1.67[H]    Ca    9.5      18 Mar 2025 07:13  Phos  2.5     03-18  Mg     2.2     03-18      Creatinine: 1.67 mg/dL (03-18-25 @ 07:13)  Creatinine: 1.73 mg/dL (03-17-25 @ 06:35)  Creatinine: 2.01 mg/dL (03-16-25 @ 09:29)  Creatinine: 2.28 mg/dL (03-15-25 @ 09:38)  Creatinine: 2.67 mg/dL (03-14-25 @ 07:14)  Creatinine: 3.06 mg/dL (03-13-25 @ 00:16)  Creatinine: 2.81 mg/dL (03-12-25 @ 00:26)    Microbiology: reviewed   Culture - Urine (collected 03-09-25 @ 18:14)  Source: Catheterized Catheterized  Final Report (03-11-25 @ 13:23):    >100,000 CFU/ml Pseudomonas aeruginosa  Organism: Pseudomonas aeruginosa (03-11-25 @ 13:23)  Organism: Pseudomonas aeruginosa (03-11-25 @ 13:23)      Method Type: MARIELENA      -  Amikacin: S <=16      -  Aztreonam: R >16      -  Cefepime: I 16      -  Ceftazidime: R >16      -  Ciprofloxacin: S <=0.25      -  Imipenem: S <=1      -  Levofloxacin: S <=0.5      -  Meropenem: S <=1      -  Piperacillin/Tazobactam: R >64    Culture - Bronchial (collected 03-08-25 @ 15:27)  Source: Bronchial Bronchial Lavage  Gram Stain (03-09-25 @ 00:20):    Moderate polymorphonuclear leukocytes per low power field    No Squamous epithelial cells per low power field    Moderate Gram Negative Rods per oil power field    Few Gram positive cocci in pairs per oil power field  Final Report (03-10-25 @ 15:13):    Moderate Pseudomonas aeruginosa    Commensal lucia consistent with body site  Organism: Pseudomonas aeruginosa (03-10-25 @ 15:13)  Organism: Pseudomonas aeruginosa (03-10-25 @ 15:13)      Method Type: MARIELENA      -  Aztreonam: S 8      -  Cefepime: S 8      -  Ceftazidime: S 8      -  Ciprofloxacin: S <=0.25      -  Imipenem: S <=1      -  Levofloxacin: S <=0.5      -  Meropenem: S <=1      -  Piperacillin/Tazobactam: R 64    Culture - Blood (collected 03-08-25 @ 00:00)  Source: Blood Blood  Final Report (03-13-25 @ 04:00):    No growth at 5 days    Culture - Blood (collected 03-07-25 @ 23:30)  Source: Blood Blood  Final Report (03-13-25 @ 04:00):    No growth at 5 days    Culture - Sputum (collected 03-07-25 @ 18:42)  Source: Sputum Sputum  Gram Stain (03-08-25 @ 07:01):    Rare polymorphonuclear leukocytes per low power field    Few Squamous epithelial cells per low power field    Moderate Gram Negative Rods seen per oil power field  Final Report (03-09-25 @ 16:20):    Numerous Pseudomonas aeruginosa    Commensal lucia consistent with body site  Organism: Pseudomonas aeruginosa (03-09-25 @ 16:20)  Organism: Pseudomonas aeruginosa (03-09-25 @ 16:20)      Method Type: MARIELENA      -  Aztreonam: S 8      -  Cefepime: S 8      -  Ceftazidime: S 8      -  Ciprofloxacin: S <=0.25      -  Imipenem: S <=1      -  Levofloxacin: S <=0.5      -  Meropenem: S <=1      -  Piperacillin/Tazobactam: R 64    Culture - Eye (collected 03-05-25 @ 16:15)  Source: Eye Conjunctiva-Right  Gram Stain (03-08-25 @ 00:03):    No polymorphonuclear cells seen    No organisms seen    by cytocentrifuge  Final Report (03-10-25 @ 19:22):    Rare Staphylococcus epidermidis  Organism: Staphylococcus epidermidis (03-10-25 @ 19:22)      Method Type: MARIELENA      -  Clindamycin: R 4      -  Erythromycin: S <=0.25      -  Gentamicin: S <=4 Should not be used as monotherapy      -  Oxacillin: R 2      -  Penicillin: R 1      -  Rifampin: S <=1 Should not be used as monotherapy      -  Tetracycline: S <=4      -  Trimethoprim/Sulfamethoxazole: S <=0.5/9.5      -  Vancomycin: S 1  Organism: Staphylococcus epidermidis (03-10-25 @ 19:22)    Radiology: reviewed     Medications:  acetaminophen     Tablet .. 650 milliGRAM(s) Oral every 6 hours PRN  albuterol/ipratropium for Nebulization 3 milliLiter(s) Nebulizer every 6 hours  aluminum hydroxide/magnesium hydroxide/simethicone Suspension 30 milliLiter(s) Oral every 4 hours PRN  artificial tears (preservative free) Ophthalmic Solution 1 Drop(s) Both EYES every 6 hours PRN  atorvastatin 20 milliGRAM(s) Oral at bedtime  chlorhexidine 2% Cloths 1 Application(s) Topical <User Schedule>  cholecalciferol 1000 Unit(s) Oral daily  enoxaparin Injectable 40 milliGRAM(s) SubCutaneous every 24 hours  epoetin krystyna-epbx (RETACRIT) Injectable 88132 Unit(s) SubCutaneous <User Schedule>  escitalopram 15 milliGRAM(s) Oral with breakfast  gabapentin Solution 150 milliGRAM(s) Oral every 12 hours  influenza  Vaccine (HIGH DOSE) 0.5 milliLiter(s) IntraMuscular once  lacosamide IVPB 200 milliGRAM(s) IV Intermittent every 12 hours  lamoTRIgine 50 milliGRAM(s) Oral two times a day  lanolin Ointment 1 Application(s) Topical daily  levETIRAcetam  IVPB 750 milliGRAM(s) IV Intermittent every 12 hours  lurasidone 20 milliGRAM(s) Oral at bedtime  melatonin 3 milliGRAM(s) Oral at bedtime PRN  nystatin Powder 1 Application(s) Topical every 8 hours  ondansetron Injectable 4 milliGRAM(s) IV Push every 8 hours PRN  pantoprazole  Injectable 40 milliGRAM(s) IV Push daily  polyethylene glycol 3350 17 Gram(s) Oral daily  sevelamer carbonate Powder 1600 milliGRAM(s) Enteral Tube every 8 hours    Prior/Completed Antimicrobials:  levoFLOXacin IVPB  levoFLOXacin IVPB  levoFLOXacin IVPB  piperacillin/tazobactam IVPB.  piperacillin/tazobactam IVPB.-  piperacillin/tazobactam IVPB.-  piperacillin/tazobactam IVPB..  piperacillin/tazobactam IVPB..  piperacillin/tazobactam IVPB...  remdesivir  IVPB  remdesivir  IVPB  remdesivir  IVPB  vancomycin  IVPB.

## 2025-03-18 NOTE — PROGRESS NOTE ADULT - ASSESSMENT
Mr. Gr is a 69 year old male with PMHx of seizure disorder, sleep apnea, HTN, chronic idiopathic constipation, stage III CKD, severe obesity, secondary hyperparathyroidism, anemia, thrombocytopenia, hyperlipidemia, testicular cancer under treatment with Dr. Ovalles who is admitted for acute hypoxic respiratory failure secondary to COVID vs superimposed pneumonia with new onset thrombocytopenia. He was recently discharged 02/06/2025 after a tenous stay including intubation in the ICU for respiratory failure in setting of seizure. He had recovered and was discharged to rehab.      Former smoker, quit 14y prior, denied ETOH, drug use. Lives at home. Does not have children. Denies family history of blood disorders or malignancy.  Denies previous workplace related exposures.  Denies previous history of blood transfusions.  Denied history of thromboses.  Denied history of  requiring anticoagulation.     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  Margins were negative.  pT3Nx. CT C/A/P with few left para-aortic and left iliac chain lymph nodes largest measuring 8.1 cm left para-aortic node, bilateral subcentimeter noncalcified pulmonary nodules measuring up to 7 mm. AFP, LDH, hCG were all elevated. 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 Started on adjuvant therapy with Cisplatin+Etoposide, so far completed 4 cycles of treatment with cisplatin dose reduced 50% and Etoposide 50% due to thrombocytopenia.      02/19/2025: on HFNC, noted tachycardia and fever, Klebsiella in urine as well, thrombocytopenia stable/improving, no signs of active bleeding     02/20/2025: developed A.fib with RVR and was placed on heparin drip and pending cardiology eval    02/21/2025: awake, on AVAPS overnight, noted RRT for hypoxia as pt removed HFNC at some point in time, good response thereafter     02/22/2025:  continues on AVAPS this morning, noted RRT overnight for hypoxia, hypercapnia, ICU following, US LE negative for DVT, counts overall stable/improved     02/23/2025: progressive respiratory failure, noted ongoing RTT this morning with pending intubation and transfer to MICU     02/24/2025: intubated 02/23/2025, sedated, on pressor support, no signs of bleeding, counts noted, no signs of bleeding     02/25/2025: continues in MICU, intubated and sedated, no signs of bleeding    02/26/2025: continues in MICU, intubated, without signs of bleeding, continues on heparin drip     02/27/2025:  remains under the care of the ICU, intubated, no signs of bleeding and continues on heparin drip, counts stable, has not required PRBC support this admission    02/28/2025: continues under the care of MICU, continues intubated, no signs of bleeding, on heparin drip    03/01/2025: continues in MICU, intubated, direct josefina IgG positive, eluate negative, not likely to be clinically significant     03/02/2025:  continues in MICU, nursing staff at bedside, no overnight events noted. CTH without acute intracranial pathology     03/03/2025: continues in MICU, intubated, on heparin drip, 1u PRBC overnight, no signs of bleeding, platelet count stable     03/04/2025: awake, moving arms spontaneously on exam, continues without much interaction however. No signs of bleeding, continues heparin drip, continues intubated in MICU     03/05/2025: extubated 03/04/2025, continues in MICU, awake and alert this morning and able to speak full sentences, discussed/reviewed findings, continues on heparin drip     03/06/2025: nursing staff at bedside, bipap overnight, without signs of bleeding, counts noted stable, renal function improving     03/07/2025:  no signs of bleeding, counts noted, continues heparin drip, continues in MICU    03/08/2025: on HFNC, no signs of bleeding, although alert and answering questions, not back to his baseline yet, continues in MICU    03/09/2025: continues in MICU, s/p re-intubation 03/08/2025 given respiratory compromise in the setting of copious secretions as evidenced by bronchoscopy, in setting of fever, now sedated, mild crusting of blood around mouth, without active sign of bleeding, counts noted reviewed, continues on heparin drip     03/10/2025: continues in MICU, no signs of bleeding, discussed with nursing staff at bedside, receiving 1u PRBC due to H/H downtrend, sputum culture with Pseudomonas, ICU and ID recs noted, continues to be intubated     03/11/2025: continues in MICU, intubated and sedated, noted counts, without signs of bleeding, appropriate response to transfusion    03/12/2025: continues in MICU, intubated and sedated, without signs of bleeding     03/13/2025: continues in MICU, s/p Tracheostomy 03/12/2025 with tracheal intubation, continues with sedation     03/14/2025:  awake and alert, mental status much improved, transferred from MICU to [address] [place] [address] 03/13/2025. Without significant events overnight, discussed with nursing staff at bedside, stated pt did well overnight, no signs of bleeding, no fever noted, medications provided via NGT and he is pending PEG-Tube placement today. His case is complex, noted with repeat need for intubations, discussed with pt, he is aware. Discussed with pts primary Oncologist as well.    03/15/2025: awake and alert, no overnight events noted, discussed with nursing staff, he did well overnight, no fevers, no signs of bleeding endorsed. He nods to information. Counts reviewed overall stable, discussed with him as well. Had PEG placed 03/14/2025 03/16/2025: no overnight events noted, comfortable overnight, nursing staff at bedside, stated pt did well, no signs of bleeding, had a BM. No fever reported. Counts reviewed, stable at this time     03/17/2025: no overnight events noted however continues with need for suctioning due to increase secretions, discussed with overnight staff, mild bleeding likely due to trauma from suctioning and recent tracheostomy placement without evidence of active bleeding otherwise. No fevers noted overnight. Had multiple smaller BMs, without evidence of bleeding, Nephrology recs and reviewed noted for Epo        #Acute hypoxic respiratory failure, Acute, Severe   # COVID  - CT noted with bilateral GGO  - ID following  - Pulmonary following  - Antibiotics per primary team/MICU and ID   - Intubated 02/23/2025 AM, MICU   - extubated 03/04/2025  - Pseudomonas from sputum culture   - Re-intubated 03/09/2025 due to increased work of breathing in setting of increased secretions, not protecting airway, tachypnea, hypoxia   - s/p Tracheostomy 03/12/2025    # Thrombocytopenia, Acute, Moderate   - Did occur during most recent admission and improved to normal prior to discharge   - Without signs of bleeding  - Could be multifactorial, possibly due to infection   - Continue to trend  - Transfuse to maintain Plt > 10k unless actively bleeding then maintain > 50k     # Brain lesion, Acute, Severe   - pt with hx of seizures  - MRI brain now with 5.4 mm enhancing lesion in the LEFT middle cerebellar peduncle with associated edema suspicious for metastases  - MRI Lumbar negative for acute disease  - Small lesion without mass effect or edema, recommended to correlate per neurology if foci and locus are concordant with seizure activity  - Neurology recs noted, new lesion not likely to cause seizure  - It is rare, but nonetheless not impossible, for testicular cancer to be metastatic to the brain  - He will need another PET-CT, can be completed outpatient once stable if concern can proceed with biopsy at that time   - Previous endocrinology eval for thyroid nodule noted  - AFP/BHCH normal,  previously   - Repeat CTH without acute intracranial pathology     # 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 Ovalles, agree with current plan  - Follow up as outpatient with Franki Ovalles MD     # Acute kidney injury on CKD Stage IIIA, Acute, Moderate   - Likely multifactorial  - Nephrology consult and recs noted  - On lovenox for DVT PPx, monitor renal function   - Continue to trend     # Normocytic anemia, Acute, Severe   - Chronic, has hx of PRBC during chemo 07/2024  - Noted Anti E, Anti-K Anti-C antibodies previously  - direct josefina IgG positive, eluate negative, not likely to be clinically significant   - s/p 2u PRBC 03/03/2025 and 03/10/2025   - Monitor for bleeding  - Epo per Nephrology   - Recommend to transfuse to maintain Hb > 7    # Klebsiella UTI, Acute, Severe > Moderate   # Pseudomonas UTI   -Antibiotics per ID and primary team       Recommendations  - Trend CBC daily, obtain post transfusion CBC today   - Transfuse to maintain Hb > 7   - Transfuse to maintain Plt >10k unless active bleeding then >50k   - On lovenox for DVT PPx, monitor renal function   - Cardiology follow up to address therapeutic anticoagulation, was on heparin drip         Thank you for allowing me to participate in the care of Mr. Gr please do not hesitate to call or text me if you have further questions or concerns.     Brayan Mart MD  [facility] [place]   Division of Hematology/Oncology  [address] [place] [address], [address]  [place] [address]  P: [phone]  F: [phone]    Attestation:    ----Pt evaluated, >50min spent including face-to-face interaction in addition to chart review, reviewing treatment plan, discussing with care staff in hospital, his outside physician, and managing the patient’s chronic diagnoses as listed in the assessment----        Mr. Gr is a 69 year old male with PMHx of seizure disorder, sleep apnea, HTN, chronic idiopathic constipation, stage III CKD, severe obesity, secondary hyperparathyroidism, anemia, thrombocytopenia, hyperlipidemia, testicular cancer under treatment with Dr. Ovalles who is admitted for acute hypoxic respiratory failure secondary to COVID vs superimposed pneumonia with new onset thrombocytopenia. He was recently discharged 02/06/2025 after a tenous stay including intubation in the ICU for respiratory failure in setting of seizure. He had recovered and was discharged to rehab.      Former smoker, quit 14y prior, denied ETOH, drug use. Lives at home. Does not have children. Denies family history of blood disorders or malignancy.  Denies previous workplace related exposures.  Denies previous history of blood transfusions.  Denied history of thromboses.  Denied history of  requiring anticoagulation.     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  Margins were negative.  pT3Nx. CT C/A/P with few left para-aortic and left iliac chain lymph nodes largest measuring 8.1 cm left para-aortic node, bilateral subcentimeter noncalcified pulmonary nodules measuring up to 7 mm. AFP, LDH, hCG were all elevated. 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 Started on adjuvant therapy with Cisplatin+Etoposide, so far completed 4 cycles of treatment with cisplatin dose reduced 50% and Etoposide 50% due to thrombocytopenia.      02/19/2025: on HFNC, noted tachycardia and fever, Klebsiella in urine as well, thrombocytopenia stable/improving, no signs of active bleeding     02/20/2025: developed A.fib with RVR and was placed on heparin drip and pending cardiology eval    02/21/2025: awake, on AVAPS overnight, noted RRT for hypoxia as pt removed HFNC at some point in time, good response thereafter     02/22/2025:  continues on AVAPS this morning, noted RRT overnight for hypoxia, hypercapnia, ICU following, US LE negative for DVT, counts overall stable/improved     02/23/2025: progressive respiratory failure, noted ongoing RTT this morning with pending intubation and transfer to MICU     02/24/2025: intubated 02/23/2025, sedated, on pressor support, no signs of bleeding, counts noted, no signs of bleeding     02/25/2025: continues in MICU, intubated and sedated, no signs of bleeding    02/26/2025: continues in MICU, intubated, without signs of bleeding, continues on heparin drip     02/27/2025:  remains under the care of the ICU, intubated, no signs of bleeding and continues on heparin drip, counts stable, has not required PRBC support this admission    02/28/2025: continues under the care of MICU, continues intubated, no signs of bleeding, on heparin drip    03/01/2025: continues in MICU, intubated, direct josefina IgG positive, eluate negative, not likely to be clinically significant     03/02/2025:  continues in MICU, nursing staff at bedside, no overnight events noted. CTH without acute intracranial pathology     03/03/2025: continues in MICU, intubated, on heparin drip, 1u PRBC overnight, no signs of bleeding, platelet count stable     03/04/2025: awake, moving arms spontaneously on exam, continues without much interaction however. No signs of bleeding, continues heparin drip, continues intubated in MICU     03/05/2025: extubated 03/04/2025, continues in MICU, awake and alert this morning and able to speak full sentences, discussed/reviewed findings, continues on heparin drip     03/06/2025: nursing staff at bedside, bipap overnight, without signs of bleeding, counts noted stable, renal function improving     03/07/2025:  no signs of bleeding, counts noted, continues heparin drip, continues in MICU    03/08/2025: on HFNC, no signs of bleeding, although alert and answering questions, not back to his baseline yet, continues in MICU    03/09/2025: continues in MICU, s/p re-intubation 03/08/2025 given respiratory compromise in the setting of copious secretions as evidenced by bronchoscopy, in setting of fever, now sedated, mild crusting of blood around mouth, without active sign of bleeding, counts noted reviewed, continues on heparin drip     03/10/2025: continues in MICU, no signs of bleeding, discussed with nursing staff at bedside, receiving 1u PRBC due to H/H downtrend, sputum culture with Pseudomonas, ICU and ID recs noted, continues to be intubated     03/11/2025: continues in MICU, intubated and sedated, noted counts, without signs of bleeding, appropriate response to transfusion    03/12/2025: continues in MICU, intubated and sedated, without signs of bleeding     03/13/2025: continues in MICU, s/p Tracheostomy 03/12/2025 with tracheal intubation, continues with sedation     03/14/2025:  awake and alert, mental status much improved, transferred from MICU to [address] [place] [address] 03/13/2025. Without significant events overnight, discussed with nursing staff at bedside, stated pt did well overnight, no signs of bleeding, no fever noted, medications provided via NGT and he is pending PEG-Tube placement today. His case is complex, noted with repeat need for intubations, discussed with pt, he is aware. Discussed with pts primary Oncologist as well.    03/15/2025: awake and alert, no overnight events noted, discussed with nursing staff, he did well overnight, no fevers, no signs of bleeding endorsed. He nods to information. Counts reviewed overall stable, discussed with him as well. Had PEG placed 03/14/2025 03/16/2025: no overnight events noted, comfortable overnight, nursing staff at bedside, stated pt did well, no signs of bleeding, had a BM. No fever reported. Counts reviewed, stable at this time     03/17/2025: no overnight events noted however continues with need for suctioning due to increase secretions, discussed with overnight staff, mild bleeding likely due to trauma from suctioning and recent tracheostomy placement without evidence of active bleeding otherwise. No fevers noted overnight. Had multiple smaller BMs, without evidence of bleeding, Nephrology recs and reviewed noted for Epo    03/18/2025: without significant overnight events, discussed with team at bedside, no evidence of bleeding, noted cardiology recs to start Eliquis 5mg BID for A.fib, currently on Lovenox, previously did require PRBC this admission but has since been stable, renal function seems to be improving, and Plt count improved, ok for AC per cardiology recs with Eliquis, monitor H/H closely         #Acute hypoxic respiratory failure, Acute, Severe   # COVID  - CT noted with bilateral GGO  - ID following  - Pulmonary following  - Antibiotics per primary team/MICU and ID   - Intubated 02/23/2025 AM, MICU   - extubated 03/04/2025  - Pseudomonas from sputum culture   - Re-intubated 03/09/2025 due to increased work of breathing in setting of increased secretions, not protecting airway, tachypnea, hypoxia   - s/p Tracheostomy 03/12/2025    # Thrombocytopenia, Acute, Moderate   - Did occur during most recent admission and improved to normal prior to discharge   - Without signs of bleeding  - Could be multifactorial, possibly due to infection   - Continue to trend  - Transfuse to maintain Plt > 10k unless actively bleeding then maintain > 50k     # Brain lesion, Acute, Severe   - pt with hx of seizures  - MRI brain now with 5.4 mm enhancing lesion in the LEFT middle cerebellar peduncle with associated edema suspicious for metastases  - MRI Lumbar negative for acute disease  - Small lesion without mass effect or edema, recommended to correlate per neurology if foci and locus are concordant with seizure activity  - Neurology recs noted, new lesion not likely to cause seizure  - It is rare, but nonetheless not impossible, for testicular cancer to be metastatic to the brain  - He will need another PET-CT, can be completed outpatient once stable if concern can proceed with biopsy at that time   - Previous endocrinology eval for thyroid nodule noted  - AFP/BHCH normal,  previously   - Repeat CTH without acute intracranial pathology     # 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 Ovalles, agree with current plan  - Follow up as outpatient with Franki Ovalles MD     # Acute kidney injury on CKD Stage IIIA, Acute, Moderate   - Likely multifactorial  - Nephrology consult and recs noted  - On lovenox for DVT PPx, monitor renal function   - Continue to trend     # Normocytic anemia, Acute, Severe   - Chronic, has hx of PRBC during chemo 07/2024  - Noted Anti E, Anti-K Anti-C antibodies previously  - direct josefina IgG positive, eluate negative, not likely to be clinically significant   - s/p 2u PRBC 03/03/2025 and 03/10/2025   - Monitor for bleeding  - Epo per Nephrology   - Recommend to transfuse to maintain Hb > 7    # Klebsiella UTI, Acute, Severe > Moderate   # Pseudomonas UTI   -Antibiotics per ID and primary team       Recommendations  - Trend CBC daily, obtain post transfusion CBC today   - Transfuse to maintain Hb > 7   - Transfuse to maintain Plt >10k unless active bleeding then >50k   - On lovenox for DVT PPx, monitor renal function   - Cardiology follow up to address therapeutic anticoagulation, without current evidence of bleeding, noted cardiology recs to start Eliquis 5mg BID for A.fib, currently on Lovenox, previously did require PRBC this admission but has since been stable, renal function seems to be improving, and Plt count improved, ok for AC per cardiology recs with Eliquis, monitor H/H closely         Thank you for allowing me to participate in the care of Mr. Gr please do not hesitate to call or text me if you have further questions or concerns.     Brayan Mart MD  [facility] [place]   Division of Hematology/Oncology  [address] [address], [address]  [place] [address]  P: [phone]  F: [phone]    Attestation:    ----Pt evaluated, >50min spent including face-to-face interaction in addition to chart review, reviewing treatment plan, discussing with care staff in hospital, his outside physician, and managing the patient’s chronic diagnoses as listed in the assessment----

## 2025-03-18 NOTE — SWALLOW FEES ASSESSMENT ADULT - ORAL PHASE
Swallow triggered as bolus reaches AE folds Swallow triggered as bolus reaches valleculae Spillage to the pyriforms sinuses

## 2025-03-18 NOTE — PROGRESS NOTE ADULT - SUBJECTIVE AND OBJECTIVE BOX
[facility] HEMATOLOGY/ON[facility]OGY INPATIENT PROGRESS NOTE     Interval Hx:   03-18-25: Mr. Gr was seen at bedside today.    Meds:   MEDICATIONS  (STANDING):  albuterol/ipratropium for Nebulization 3 milliLiter(s) Nebulizer every 6 hours  atorvastatin 20 milliGRAM(s) Oral at bedtime  chlorhexidine 0.12% Liquid 15 milliLiter(s) Oral Mucosa every 12 hours  chlorhexidine 2% Cloths 1 Application(s) Topical <User Schedule>  cholecalciferol 1000 Unit(s) Oral daily  enoxaparin Injectable 40 milliGRAM(s) SubCutaneous every 24 hours  epoetin krystyna-epbx (RETACRIT) Injectable 81027 Unit(s) SubCutaneous <User Schedule>  escitalopram 15 milliGRAM(s) Oral with breakfast  gabapentin Solution 150 milliGRAM(s) Oral every 12 hours  influenza  Vaccine (HIGH DOSE) 0.5 milliLiter(s) IntraMuscular once  lacosamide IVPB 200 milliGRAM(s) IV Intermittent every 12 hours  lamoTRIgine 50 milliGRAM(s) Oral two times a day  lanolin Ointment 1 Application(s) Topical daily  levETIRAcetam  IVPB 750 milliGRAM(s) IV Intermittent every 12 hours  lurasidone 20 milliGRAM(s) Oral at bedtime  nystatin Powder 1 Application(s) Topical every 8 hours  pantoprazole  Injectable 40 milliGRAM(s) IV Push daily  polyethylene glycol 3350 17 Gram(s) Oral daily  sevelamer carbonate Powder 1600 milliGRAM(s) Enteral Tube every 8 hours    MEDICATIONS  (PRN):  acetaminophen     Tablet .. 650 milliGRAM(s) Oral every 6 hours PRN Temp greater or equal to 38C (100.4F), Mild Pain (1 - 3)  aluminum hydroxide/magnesium hydroxide/simethicone Suspension 30 milliLiter(s) Oral every 4 hours PRN Dyspepsia  artificial tears (preservative free) Ophthalmic Solution 1 Drop(s) Both EYES every 6 hours PRN Dry Eyes  melatonin 3 milliGRAM(s) Oral at bedtime PRN Insomnia  ondansetron Injectable 4 milliGRAM(s) IV Push every 8 hours PRN Nausea and/or Vomiting    Vital Signs Last 24 Hrs  T(C): 36.3 (18 Mar 2025 00:23), Max: 37.2 (17 Mar 2025 11:15)  T(F): 97.4 (18 Mar 2025 00:23), Max: 98.9 (17 Mar 2025 11:15)  HR: 92 (18 Mar 2025 03:35) (84 - 97)  BP: 151/89 (18 Mar 2025 00:23) (145/78 - 151/89)  BP(mean): --  RR: 18 (18 Mar 2025 00:23) (18 - 19)  SpO2: 98% (18 Mar 2025 03:35) (96% - 100%)    Parameters below as of 18 Mar 2025 00:23  Patient On (Oxygen Delivery Method): tracheostomy collar    Physical Exam:  Gen: NAD, tracheostomy with ETT  HEENT: EOMI, MMM  Chest: equal chest rise  Cardiac: regular   Abd: non distended    Labs:                        8.5    7.56  )-----------( 165      ( 17 Mar 2025 06:35 )             26.7     CBC Full  -  ( 17 Mar 2025 06:35 )  WBC Count : 7.56 K/uL  RBC Count : 2.70 M/uL  Hemoglobin : 8.5 g/dL  Hematocrit : 26.7 %  Platelet Count - Automated : 165 K/uL  Mean Cell Volume : 98.9 fl  Mean Cell Hemoglobin : 31.5 pg  Mean Cell Hemoglobin Concentration : 31.8 g/dL    03-17    142  |  106  |  26[H]  ----------------------------<  95  3.5   |  23  |  1.73[H]    Ca    9.6      17 Mar 2025 06:35  Phos  2.8     03-17  Mg     2.2     03-17         [facility] INPATIENT PROGRESS NOTE     Interval Hx:   03-18-25: Mr. Gr was seen at bedside today, without significant overnight events, discussed with team at bedside, no evidence of bleeding, noted cardiology recs to start Eliquis 5mg BID for A.ana, currently on Lovenox, previously did require PRBC this admission but has since been stable, renal function seems to be improving, and Plt count improved, ok for AC per cardiology recs with Eliquis, monitor H/H closely     Meds:   MEDICATIONS  (STANDING):  albuterol/ipratropium for Nebulization 3 milliLiter(s) Nebulizer every 6 hours  atorvastatin 20 milliGRAM(s) Oral at bedtime  chlorhexidine 0.12% Liquid 15 milliLiter(s) Oral Mucosa every 12 hours  chlorhexidine 2% Cloths 1 Application(s) Topical <User Schedule>  cholecalciferol 1000 Unit(s) Oral daily  enoxaparin Injectable 40 milliGRAM(s) SubCutaneous every 24 hours  epoetin krystyna-epbx (RETACRIT) Injectable 90556 Unit(s) SubCutaneous <User Schedule>  escitalopram 15 milliGRAM(s) Oral with breakfast  gabapentin Solution 150 milliGRAM(s) Oral every 12 hours  influenza  Vaccine (HIGH DOSE) 0.5 milliLiter(s) IntraMuscular once  lacosamide IVPB 200 milliGRAM(s) IV Intermittent every 12 hours  lamoTRIgine 50 milliGRAM(s) Oral two times a day  lanolin Ointment 1 Application(s) Topical daily  levETIRAcetam  IVPB 750 milliGRAM(s) IV Intermittent every 12 hours  lurasidone 20 milliGRAM(s) Oral at bedtime  nystatin Powder 1 Application(s) Topical every 8 hours  pantoprazole  Injectable 40 milliGRAM(s) IV Push daily  polyethylene glycol 3350 17 Gram(s) Oral daily  sevelamer carbonate Powder 1600 milliGRAM(s) Enteral Tube every 8 hours    MEDICATIONS  (PRN):  acetaminophen     Tablet .. 650 milliGRAM(s) Oral every 6 hours PRN Temp greater or equal to 38C (100.4F), Mild Pain (1 - 3)  aluminum hydroxide/magnesium hydroxide/simethicone Suspension 30 milliLiter(s) Oral every 4 hours PRN Dyspepsia  artificial tears (preservative free) Ophthalmic Solution 1 Drop(s) Both EYES every 6 hours PRN Dry Eyes  melatonin 3 milliGRAM(s) Oral at bedtime PRN Insomnia  ondansetron Injectable 4 milliGRAM(s) IV Push every 8 hours PRN Nausea and/or Vomiting    Vital Signs Last 24 Hrs  T(C): 36.3 (18 Mar 2025 00:23), Max: 37.2 (17 Mar 2025 11:15)  T(F): 97.4 (18 Mar 2025 00:23), Max: 98.9 (17 Mar 2025 11:15)  HR: 92 (18 Mar 2025 03:35) (84 - 97)  BP: 151/89 (18 Mar 2025 00:23) (145/78 - 151/89)  BP(mean): --  RR: 18 (18 Mar 2025 00:23) (18 - 19)  SpO2: 98% (18 Mar 2025 03:35) (96% - 100%)    Parameters below as of 18 Mar 2025 00:23  Patient On (Oxygen Delivery Method): tracheostomy collar    Physical Exam:  Gen: NAD, tracheostomy with ETT  HEENT: EOMI, MMM  Chest: equal chest rise  Cardiac: regular   Abd: non distended    Labs:                        8.5    7.56  )-----------( 165      ( 17 Mar 2025 06:35 )             26.7     CBC Full  -  ( 17 Mar 2025 06:35 )  WBC Count : 7.56 K/uL  RBC Count : 2.70 M/uL  Hemoglobin : 8.5 g/dL  Hematocrit : 26.7 %  Platelet Count - Automated : 165 K/uL  Mean Cell Volume : 98.9 fl  Mean Cell Hemoglobin : 31.5 pg  Mean Cell Hemoglobin Concentration : 31.8 g/dL    03-17    142  |  106  |  26[H]  ----------------------------<  95  3.5   |  23  |  1.73[H]    Ca    9.6      17 Mar 2025 06:35  Phos  2.8     03-17  Mg     2.2     03-17

## 2025-03-18 NOTE — PROGRESS NOTE ADULT - PROBLEM SELECTOR PLAN 10
- Full Code  - Contact: Sister Ester [phone]  - Sister updated at bedside today as well as patients brother over Facetime

## 2025-03-18 NOTE — SWALLOW FEES ASSESSMENT ADULT - SPECIFY REASON(S)
To instrumentally re-assess swallow function, r/o dysphagia
To instrumentally assess swallow function, r/o dysphagia

## 2025-03-18 NOTE — SWALLOW FEES ASSESSMENT ADULT - SLP PERTINENT HISTORY OF CURRENT PROBLEM
68 y/o M with PMH of HTN, HLD, VAZQUEZ on CPAP and home O2 (2LNC daytime), CKD, [id number] year old male with a past medical history of hypertension, hyperlipemia VAZQUEZ (on CPAP at bedtime, NC 2 liters during the day), CKD stage 3, epilepsy, schizophrenia, developmental delay, metastatic testicular cancer (s/p orchiectomy, actively on chemotherapy, last dose of etoposide/cisplatin on 6/2024), presenting with acute on chronic hypoxemic respiratory failure, secondary to (a) COVID-19 infection, (b) superimposed pneumonia after recent influenza infection,  Transferred to MICU on 2/23 after RRT on floors due to hypoxia even with max setting AVAPS. Patient started on midodrine and weaned off of levo on 2/24. Failed pressure support on 2/25 and started diuresis with Bumex. CTH was unremarkable and weaned off of Precedex on 3/1.
69 year old male with a past medical history of hypertension, hyperlipemia VAZQUEZ (on CPAP at bedtime, NC 2 liters during the day), CKD stage 3, epilepsy, schizophrenia, developmental delay, metastatic testicular cancer (s/p orchiectomy, actively on chemotherapy, last dose of etoposide/cisplatin on 6/2024) presenting with worsening shortness of breath. Patient was recently hospitalized at [facility] from January 27th 2025 to February 6th 2025 for seizure and influenza virus infection, which required intubation. He was sent to rehab (originally from home) from hospital. He returns due to sudden onset shortness of breath and worsening oxygenation. Of note, he tested positive for COVID-19 at facility on 2/13/25 and was not put on any COVID-19 treatment at the time, only guaifenesin and scopolamine patch. Patient was placed on non-rebreather and sent to the hospital on 2/16/25.

## 2025-03-18 NOTE — SWALLOW FEES ASSESSMENT ADULT - ADDITIONAL RECOMMENDATIONS
Maintain good oral care   This service will continue to follow for course of restorative swallow therapy with therapeutic trials of thin puree. Pt will require eventual repeat instrumental exam; favor MBS to obtain visualization of upper esophagus given reflux observed on this exam.
Maintain good oral care   Will continue to follow, can repeat instrumental study once O2 requirements are improved

## 2025-03-18 NOTE — PROGRESS NOTE ADULT - PROBLEM SELECTOR PLAN 8
- SHAGUFTA on CKD stage 3 suspected in setting of new infection Covid 19   - CR peaked to 4.34 on 3/3, now improving   - Renvela d/cd Hyperphosphatemia resolved   - Continue Retacrit 17249 U tiw sq

## 2025-03-18 NOTE — CONSULT NOTE ADULT - SUBJECTIVE AND OBJECTIVE BOX
Patient is a 69y old  Male who presents with a chief complaint of Acute on chronic hypoxemic respiratory failure (18 Mar 2025 09:14)    Admission HPI:  [facility] Division of Hospital Medicine  Cas Leung MD  Available via MS Teams    69 year old male with a past medical history of hypertension, hyperlipemia VAZQUEZ (on CPAP at bedtime, NC 2 liters during the day), CKD stage 3, epilepsy, schizophrenia, developmental delay, metastatic testicular cancer (s/p orchiectomy, actively on chemotherapy, last dose of etoposide/cisplatin on 6/2024) presenting with worsening shortness of breath.     Patient was recently hospitalized at [facility] from January 27th 2025 to February 6th 2025 for seizure and influenza virus infection, which required intubation. He was sent to rehab (originally from home) from hospital.     He returns due to sudden onset shortness of breath and worsening oxygenation. Of note, he tested positive for COVID-19 at facility on 2/13/25 and was not put on any COVID-19 treatment at the time, only guaifenesin and scopolamine patch. Patient was placed on non-rebreather and sent to the hospital on 2/16/25.     In the ER, vital signs significant for T-max 101.9F, HR 84-90. WBC 6.8. Hemoglobin 9.1. Platelet 87. AST 70. ALT 48. Cr 3.27. BUN 45. pH 7.30, pCO2 48. UA negative for infection. Patient given vancomycin, Zosyn, albuterol, and  cc bolus. Patient admitted to the general medicine service for further care.     Patient evaluated at bedside during admission. Unable to talk with the patient given that he is on BIPAP. Attempted to take the patient off BIPAP while in the room, but patient's work of breathing immediately began to increase and patient endorsed shortness of breath, so he was placed back on. History taken by chart review.  (16 Feb 2025 13:16)    Interval History:  Patient with very complicated hospital course - with respiratory failure due to Covid pneumonia- required intubation- extubated 3/4, re-intubated 3/8, s/p trach 3/12.  Course also c/b Afib w RVR, SHAGUFTA on CKD, UTI, sepsis. Also has been noted tohave brain lesion, followed by neurology.   Patient s/p PEG on 3/14.    REVIEW OF SYSTEMS: No chest pain, shortness of breath, nausea, vomiting or diarhea; other ROS neg     PAST MEDICAL & SURGICAL HISTORY  Hypertension, unspecified type    Other hyperlipidemia    History of epilepsy    Paranoid schizophrenia    Obstructive sleep apnea    Testicular cancer    H/O Spinal surgery    FUNCTIONAL HISTORY:   Lives alone in home w 4 GENARO- PTA had HHA 4.5 hrs/ d to assist with ADLs    CURRENT FUNCTIONAL STATUS:  Max A bed mob, Dependent transfers    FAMILY HISTORY   N/C    MEDICATIONS   acetaminophen     Tablet .. 650 milliGRAM(s) Oral every 6 hours PRN  albuterol/ipratropium for Nebulization 3 milliLiter(s) Nebulizer every 6 hours  aluminum hydroxide/magnesium hydroxide/simethicone Suspension 30 milliLiter(s) Oral every 4 hours PRN  artificial tears (preservative free) Ophthalmic Solution 1 Drop(s) Both EYES every 6 hours PRN  atorvastatin 20 milliGRAM(s) Oral at bedtime  chlorhexidine 2% Cloths 1 Application(s) Topical <User Schedule>  cholecalciferol 1000 Unit(s) Oral daily  enoxaparin Injectable 40 milliGRAM(s) SubCutaneous every 24 hours  epoetin krystyna-epbx (RETACRIT) Injectable 00856 Unit(s) SubCutaneous <User Schedule>  escitalopram 15 milliGRAM(s) Oral with breakfast  gabapentin Solution 150 milliGRAM(s) Oral every 12 hours  influenza  Vaccine (HIGH DOSE) 0.5 milliLiter(s) IntraMuscular once  lacosamide IVPB 200 milliGRAM(s) IV Intermittent every 12 hours  lamoTRIgine 50 milliGRAM(s) Oral two times a day  lanolin Ointment 1 Application(s) Topical daily  levETIRAcetam  IVPB 750 milliGRAM(s) IV Intermittent every 12 hours  lurasidone 20 milliGRAM(s) Oral at bedtime  melatonin 3 milliGRAM(s) Oral at bedtime PRN  nystatin Powder 1 Application(s) Topical every 8 hours  ondansetron Injectable 4 milliGRAM(s) IV Push every 8 hours PRN  pantoprazole  Injectable 40 milliGRAM(s) IV Push daily  polyethylene glycol 3350 17 Gram(s) Oral daily  sevelamer carbonate Powder 1600 milliGRAM(s) Enteral Tube every 8 hours    ALLERGIES  latex (Rash)  seasonal allergies (Unknown)  No Known Drug Allergies    VITALS  T(C): 37.2 (03-18-25 @ 05:36), Max: 37.2 (03-17-25 @ 11:15)  HR: 91 (03-18-25 @ 05:36) (88 - 97)  BP: 154/93 (03-18-25 @ 05:36) (145/78 - 154/93)  RR: 20 (03-18-25 @ 05:36) (18 - 20)  SpO2: 98% (03-18-25 @ 05:36) (96% - 100%)  Wt(kg): --    PHYSICAL EXAM  Constitutional - NAD, Comfortable  HEENT - NCAT, EOMI  Neck - Supple  Chest - No distress, no use of accessory muscles for respiration  Cardiovascular -Well perfused  Abdomen - BS+, Soft, NTND  Extremities - No C/C/E, No calf tenderness   Neurologic Exam -                    Cognitive - Awake, Alert, AAO to self, place, date, year, situation     Communication - Fluent, No dysarthria, no aphasia     Cranial Nerves - CN 2-12 intact     Motor - No focal deficits      Sensory - Intact to LT     Reflexes - DTR Intact, No primitive reflexive  Psychiatric - Mood stable, Affect WNL    RECENT LABS/IMAGING  CBC Full  -  ( 18 Mar 2025 07:13 )  WBC Count : 7.13 K/uL  RBC Count : 2.72 M/uL  Hemoglobin : 8.5 g/dL  Hematocrit : 26.9 %  Platelet Count - Automated : 180 K/uL  Mean Cell Volume : 98.9 fl  Mean Cell Hemoglobin : 31.3 pg  Mean Cell Hemoglobin Concentration : 31.6 g/dL  Auto Neutrophil # : x  Auto Lymphocyte # : x  Auto Monocyte # : x  Auto Eosinophil # : x  Auto Basophil # : x  Auto Neutrophil % : x  Auto Lymphocyte % : x  Auto Monocyte % : x  Auto Eosinophil % : x  Auto Basophil % : x    03-18    141  |  107  |  23  ----------------------------<  97  3.6   |  24  |  1.67[H]    Ca    9.5      18 Mar 2025 07:13  Phos  2.5     03-18  Mg     2.2     03-18      Urinalysis Basic - ( 18 Mar 2025 07:13 )    Color: x / Appearance: x / SG: x / pH: x  Gluc: 97 mg/dL / Ketone: x  / Bili: x / Urobili: x   Blood: x / Protein: x / Nitrite: x   Leuk Esterase: x / RBC: x / WBC x   Sq Epi: x / Non Sq Epi: x / Bacteria: x    Impression:  70 yo with functional deficits secondary to diagnosis of debility    Plan:  PT- ROM, Bed Mob, Transfers, Amb w AD and bracing as needed  OT- ADLs, bracing  SLP- Dysphagia eval and treat  Prec- Falls, Cardiac, Pulm, Seizure  DVT Prophylaxis - Lovenox  Skin- Turn q2 h  Dispo-     Total time taken to review relevant records and imaging results, examine patient, write note, and, when applicable, discuss case with patient, family, , resident, medical student and other medical providers:     [  ] 40 minutes (23865)  [  ] 55 minutes (07350)  [  ] 75 minutes (42204)    [  ] 25 minutes (27169)  [  ] 35 minutes (93764)  [  ] 50 minutes (82027)           Patient is a 69y old  Male who presents with a chief complaint of Acute on chronic hypoxemic respiratory failure (18 Mar 2025 09:14)    Admission HPI:  [facility] Division of Hospital Medicine  Cas Leung MD  Available via MS Teams    69 year old male with a past medical history of hypertension, hyperlipemia VAZQUEZ (on CPAP at bedtime, NC 2 liters during the day), CKD stage 3, epilepsy, schizophrenia, developmental delay, metastatic testicular cancer (s/p orchiectomy, actively on chemotherapy, last dose of etoposide/cisplatin on 6/2024) presenting with worsening shortness of breath.     Patient was recently hospitalized at [facility] from January 27th 2025 to February 6th 2025 for seizure and influenza virus infection, which required intubation. He was sent to rehab (originally from home) from hospital.     He returns due to sudden onset shortness of breath and worsening oxygenation. Of note, he tested positive for COVID-19 at facility on 2/13/25 and was not put on any COVID-19 treatment at the time, only guaifenesin and scopolamine patch. Patient was placed on non-rebreather and sent to the hospital on 2/16/25.     In the ER, vital signs significant for T-max 101.9F, HR 84-90. WBC 6.8. Hemoglobin 9.1. Platelet 87. AST 70. ALT 48. Cr 3.27. BUN 45. pH 7.30, pCO2 48. UA negative for infection. Patient given vancomycin, Zosyn, albuterol, and  cc bolus. Patient admitted to the general medicine service for further care.     Patient evaluated at bedside during admission. Unable to talk with the patient given that he is on BIPAP. Attempted to take the patient off BIPAP while in the room, but patient's work of breathing immediately began to increase and patient endorsed shortness of breath, so he was placed back on. History taken by chart review.  (16 Feb 2025 13:16)    Interval History:  Patient with very complicated hospital course - with respiratory failure due to Covid pneumonia- required intubation- extubated 3/4, re-intubated 3/8, s/p trach 3/12.  Course also c/b Afib w RVR, SHAGUFTA on CKD, UTI, sepsis. Also has been noted tohave brain lesion, followed by neurology.   Patient s/p PEG on 3/14.    REVIEW OF SYSTEMS: + weakness, + dyspnea, No chest pain, nausea, vomiting or diarhea; other ROS neg     PAST MEDICAL & SURGICAL HISTORY  Hypertension, unspecified type    Other hyperlipidemia    History of epilepsy    Paranoid schizophrenia    Obstructive sleep apnea    Testicular cancer    H/O Spinal surgery    FUNCTIONAL HISTORY:   Lives alone in home w 4 GENARO- PTA had HHA 4.5 hrs/ d to assist with ADLs    CURRENT FUNCTIONAL STATUS:  Max A bed mob, Dependent transfers    FAMILY HISTORY   N/C    MEDICATIONS   acetaminophen     Tablet .. 650 milliGRAM(s) Oral every 6 hours PRN  albuterol/ipratropium for Nebulization 3 milliLiter(s) Nebulizer every 6 hours  aluminum hydroxide/magnesium hydroxide/simethicone Suspension 30 milliLiter(s) Oral every 4 hours PRN  artificial tears (preservative free) Ophthalmic Solution 1 Drop(s) Both EYES every 6 hours PRN  atorvastatin 20 milliGRAM(s) Oral at bedtime  chlorhexidine 2% Cloths 1 Application(s) Topical <User Schedule>  cholecalciferol 1000 Unit(s) Oral daily  enoxaparin Injectable 40 milliGRAM(s) SubCutaneous every 24 hours  epoetin krystyna-epbx (RETACRIT) Injectable 25361 Unit(s) SubCutaneous <User Schedule>  escitalopram 15 milliGRAM(s) Oral with breakfast  gabapentin Solution 150 milliGRAM(s) Oral every 12 hours  influenza  Vaccine (HIGH DOSE) 0.5 milliLiter(s) IntraMuscular once  lacosamide IVPB 200 milliGRAM(s) IV Intermittent every 12 hours  lamoTRIgine 50 milliGRAM(s) Oral two times a day  lanolin Ointment 1 Application(s) Topical daily  levETIRAcetam  IVPB 750 milliGRAM(s) IV Intermittent every 12 hours  lurasidone 20 milliGRAM(s) Oral at bedtime  melatonin 3 milliGRAM(s) Oral at bedtime PRN  nystatin Powder 1 Application(s) Topical every 8 hours  ondansetron Injectable 4 milliGRAM(s) IV Push every 8 hours PRN  pantoprazole  Injectable 40 milliGRAM(s) IV Push daily  polyethylene glycol 3350 17 Gram(s) Oral daily  sevelamer carbonate Powder 1600 milliGRAM(s) Enteral Tube every 8 hours    ALLERGIES  latex (Rash)  seasonal allergies (Unknown)  No Known Drug Allergies    VITALS  T(C): 37.2 (03-18-25 @ 05:36), Max: 37.2 (03-17-25 @ 11:15)  HR: 91 (03-18-25 @ 05:36) (88 - 97)  BP: 154/93 (03-18-25 @ 05:36) (145/78 - 154/93)  RR: 20 (03-18-25 @ 05:36) (18 - 20)  SpO2: 98% (03-18-25 @ 05:36) (96% - 100%)  Wt(kg): --    PHYSICAL EXAM  Constitutional - NAD, Comfortable  HEENT - NCAT, EOMI  Neck - Supple  Chest - Scattered rhonchi  Cardiovascular -Well perfused  Abdomen - BS+, Soft, NTND  Extremities - Trace Edema, No calf tenderness   Neurologic Exam -                 Alert  Cooperative  Motor 3/5 bl UE and LEs  Sensation intact  No clonus   Psychiatric - Mood stable, Affect WNL    RECENT LABS/IMAGING  CBC Full  -  ( 18 Mar 2025 07:13 )  WBC Count : 7.13 K/uL  RBC Count : 2.72 M/uL  Hemoglobin : 8.5 g/dL  Hematocrit : 26.9 %  Platelet Count - Automated : 180 K/uL  Mean Cell Volume : 98.9 fl  Mean Cell Hemoglobin : 31.3 pg  Mean Cell Hemoglobin Concentration : 31.6 g/dL  Auto Neutrophil # : x  Auto Lymphocyte # : x  Auto Monocyte # : x  Auto Eosinophil # : x  Auto Basophil # : x  Auto Neutrophil % : x  Auto Lymphocyte % : x  Auto Monocyte % : x  Auto Eosinophil % : x  Auto Basophil % : x    03-18    141  |  107  |  23  ----------------------------<  97  3.6   |  24  |  1.67[H]    Ca    9.5      18 Mar 2025 07:13  Phos  2.5     03-18  Mg     2.2     03-18      Urinalysis Basic - ( 18 Mar 2025 07:13 )    Color: x / Appearance: x / SG: x / pH: x  Gluc: 97 mg/dL / Ketone: x  / Bili: x / Urobili: x   Blood: x / Protein: x / Nitrite: x   Leuk Esterase: x / RBC: x / WBC x   Sq Epi: x / Non Sq Epi: x / Bacteria: x    Impression:  68 yo with functional deficits secondary to diagnosis of debility    Plan:  PT- ROM, Bed Mob, Transfers, Amb w AD and bracing as needed  OT- ADLs, bracing  SLP- Dysphagia eval and treat  Prec- Falls, Cardiac, Pulm, Seizure  DVT Prophylaxis - Lovenox  Skin- Turn q2 h  Dispo- ROCHELLE- can not tolerate three hours of rehab per day and will require a longer length of rehabilitation stay.     If shows improvement can consider for acute rehab- d/w     Total time taken to review relevant records and imaging results, examine patient, write note, and, when applicable, discuss case with patient, family, , resident, medical student and other medical providers:     [  ] 40 minutes (48060)  [X] 55 minutes (16927)  [  ] 75 minutes (43945)    [  ] 25 minutes (16237)  [  ] 35 minutes (34061)  [  ] 50 minutes (07926)

## 2025-03-18 NOTE — PROGRESS NOTE ADULT - ASSESSMENT
69 year old male with a past medical history of hypertension, hyperlipemia VAZQUEZ (on CPAP at bedtime, NC 2 liters during the day), CKD stage 3, epilepsy, schizophrenia, developmental delay, metastatic testicular cancer (s/p orchiectomy, actively on chemotherapy, last dose of etoposide/cisplatin on 6/2024) presenting with worsening shortness of breath. Patient was recently hospitalized at [facility] from January 27th 2025 to February 6th 2025 for seizure and influenza virus infection, which required intubation. He was sent to rehab (originally from home) from hospital. He returns due to sudden onset shortness of breath and worsening oxygenation. Of note, he tested positive for COVID-19       1- SHAGUFTA on CKDIII  2- covid-19  3- anemia  4- hypotension  5- respiratory failure         SHAGUFTA suspected in setting of new infection. covid 19   creatinine is improving  bp is in range   hyperphosphatemia, phos is low normal   d/c renvela  anemia, trend hb   retacrit 93026 U tiw sq  strict I/O  trend creatinine and electroytes daily

## 2025-03-18 NOTE — PROGRESS NOTE ADULT - ASSESSMENT
Patient is a 69 year old male with PMH of HTN, HLD, VAZQUEZ (on CPAP at bedtime, NC 2 liters during the day), CKD3, epilepsy, schizophrenia, developmental delay, metastatic testicular cancer (s/p orchiectomy, actively on chemotherapy, last dose of etoposide/cisplatin on 6/2024) presenting with worsening shortness of breath. Patient was recently hospitalized at [facility] from January 27th 2025 to February 6th 2025 for seizure and influenza virus infection, which required intubation. He was sent to rehab (originally from home) from hospital and now returns due to sudden onset shortness of breath and worsening oxygenation. Of note, he tested positive for COVID-19 at facility on 2/13/25 and was not put on any COVID-19 treatment at the time, only guaifenesin and scopolamine patch. Patient was placed on non-rebreather and sent to the hospital on 2/16/25. Noted initial discussion with patient/family and they decided to hold off on remdesivir given LFTs and SHAGUFTA.     Acute on chronic hypoxic respiratory failure  COVID-19 pneumonia, superimposed bacterial pneumonia  Acute on chronic HFpEF  Klebsiella UTI, treated   SHAGUFTA on CKD  Severe sepsis, hypotension, SHAGUFTA, likely due to aspiration pneumonia, treated  Now with fever with copious secretions post extubation - Pseudomonas pneumonia   Pseudomonas UTI  - CT chest with extensive b/l ggo c/w COVID pneumonia; tracheomalacia   - MRSA PCR screen negative, s/p vancomycin   - 2/18 worsening resp status, requiring AVAPS, started on remdesivir   - 2/22 completed remdesivir x5d course   - 2/23 s/p RRT for inc WOB, s/p intubation, suspected aspiration   - 2/24 intubated, on sedation, pressor support, SHAGUFTA, WBC wnl   - 2/25 afebrile, off pressor, WBC wnl, Cr stable  - 2/26 CT chest with improved upper lobe ggo, new/worsening confluent areas of consolidation in mid-lower lungs   - 2/26 completed dexamethasone x10d  - 2/28 completed zosyn   - 3/4 s/p extubation, now on NC, copious secretions requiring frequent suctioning   - 3/5 started on erythromycin for conjunctivitis, culture grew Staph epi - S to erythromycin   - 3/7 febrile, WBC noted, secretions less but still a lot, worsening pulm findings, likely aspirating, started on zosyn    - s/p re-intubation and bronchoscopy with L mainstem mucous plug, thick secretions -send for culture    - 3/7 Scx noted with Pseudomonas aeruginosa -- now R to zosyn (h/o pansensitive Pseudomonas in Scx in 1/2025)   - 3/8 BAL culture with mod Pseudomonas   -  3/9 pm switched from zosyn to levofloxacin based on sensitivities   - 3/9 noted with cloudy urine, UA with pyuria -- Ucx growing Pseudomonas also (sensitivities noted)   - 3/10 WBC normalized, Hb low, temps wnl, remains intubated   - 3/12 s/p tracheostomy   - 3/14 WBC noted likely reactive--normalized; s/p PEG placement   - 3/15 completed levofloxacin x7d course     Recommendations:  Continue off antibiotics   Monitor temps/WBC, Cr  Supportive care  Aspiration precautions  Continue rest of care per primary team       Greyson Mart M.D.  [facility]  Available on [employer] TEAMS - *PREFERRED*  [phone]  After 5pm on weekdays and all day on weekends - please call [phone]     Thank you for consulting us and involving us in the management of this patients case. In addition to reviewing history, imaging, documents, labs, microbiology, took into account antibiotic stewardship, local antibiogram and infection control strategies and potential transmission issues.

## 2025-03-18 NOTE — SWALLOW FEES ASSESSMENT ADULT - COMMENTS
On 3/4 patient extubated and C conversations with family revealed that they would want patient to be trached. On 3/7, patient found to have RLL consolidation on POCUS and started on Zosyn, intubated on 3/8 due to increased lethargy and inability to clear oral secretions. Trach placed on 3/12. Transferred to RCU on 3/13. S/p Peg 3/14.  EGD with normal findings.     3/17: On trach collar ON 40% -- AM ABG 7.37 / 43 / 140 / 25. O2 lowered to 30% -- ABG ordered for AM. TTE to evaluate for possible L atrial thrombus ordered for today 3/17 -- report No obvious Left atrial thrombus; unable to evaluate left atrial appendage on TTE.. Cr downtrending 2.01 --> 1.73. Pseudomonas PNA on + Bal Cx/ recurrent PSA UTI- completed Levaquin 3/15.  Cards says can start BB and Eliquis -- f/u with Heme/onc re: starting. Speaking valve trial.     Speech/swallow hx: Pt is well known to this service. S/p multiple evaluations (bedside & FEES) on prior admissions and this admission. Most recent assessment was FEES on 2/21/25 with recommendations for a puree diet with moderately thick liquids via teaspoon - at that time, patient on HFNC. Now s/p trach and PEG. Seen for speaking valve assessment on 3/17; cleared to utilize speaking valve with supervision for initial 1-2 days. Discussed with ACPs Maureen/ Edith; plan to proceed with FEES on 3/18 for formal re-assessment of swallow sequence. Pt in agreement with POC. Frothy secretions in the valleculae; Trace laryngeal penetration of thin secretions. Slight redness/ irritation along posterior aspects of b/l vocal cords. Cords otherwise mobile, with good contact.

## 2025-03-18 NOTE — PROGRESS NOTE ADULT - SUBJECTIVE AND OBJECTIVE BOX
DATE OF SERVICE: 03-18-25 @ 13:51    Patient is a 69y old  Male who presents with a chief complaint of Acute on chronic hypoxemic respiratory failure (18 Mar 2025 09:41)      INTERVAL HISTORY: Feels ok. Nephew at bedside.     REVIEW OF SYSTEMS:  CONSTITUTIONAL: No weakness  EYES/ENT: No visual changes;  No throat pain   NECK: No pain or stiffness  RESPIRATORY: No cough, wheezing; No shortness of breath  CARDIOVASCULAR: No chest pain or palpitations  GASTROINTESTINAL: No abdominal  pain. No nausea, vomiting, or hematemesis  GENITOURINARY: No dysuria, frequency or hematuria  NEUROLOGICAL: No stroke like symptoms  SKIN: No rashes    	  MEDICATIONS:        PHYSICAL EXAM:  T(C): 37.1 (03-18-25 @ 11:11), Max: 37.2 (03-18-25 @ 05:36)  HR: 97 (03-18-25 @ 11:35) (88 - 97)  BP: 147/88 (03-18-25 @ 11:11) (147/88 - 154/93)  RR: 18 (03-18-25 @ 11:11) (18 - 20)  SpO2: 99% (03-18-25 @ 11:35) (95% - 100%)  Wt(kg): --  I&O's Summary    17 Mar 2025 07:01  -  18 Mar 2025 07:00  --------------------------------------------------------  IN: 690 mL / OUT: 900 mL / NET: -210 mL          Appearance: In no distress	  HEENT:    PERRL, EOMI	  Cardiovascular:  S1 S2, No JVD  Respiratory: Lungs clear to auscultation	  Gastrointestinal:  Soft, Non-tender, + BS	  Vascularature:  No edema of LE  Psychiatric: Appropriate affect   Neuro: no acute focal deficits                               8.5    7.13  )-----------( 180      ( 18 Mar 2025 07:13 )             26.9     03-18    141  |  107  |  23  ----------------------------<  97  3.6   |  24  |  1.67[H]    Ca    9.5      18 Mar 2025 07:13  Phos  2.5     03-18  Mg     2.2     03-18          Labs personally reviewed      ASSESSMENT/PLAN: 	      69 year old male with a past medical history of hypertension, hyperlipemia VAZQUEZ (on CPAP at bedtime, NC 2 liters during the day), CKD stage 3, epilepsy, schizophrenia, developmental delay, metastatic testicular cancer (s/p orchiectomy, actively on chemotherapy, last dose of etoposide/cisplatin on 6/2024), presenting with acute on chronic hypoxemic respiratory failure, secondary to (a) COVID-19 infection, (b) superimposed pneumonia after recent influenza infection, and/or (c) fluid overload.     Problem/Plan - 1:  ·  Problem: Acute on chronic respiratory failure with hypoxia.   ·  Plan: Likely 2/2 PNA, HF  - Appreciate pulmonology.  - s/p trach 3/12  - Transferred to RCU    Problem/Plan - 2:  ·  Problem: SHAGUFTA (acute kidney injury).   ·  Plan: Has a history of CKD stage 3, Cr 2.38 at baseline.    - Nephrology following  - BUN now improved following de-escalation of diuretics    Problem/Plan - 3:  ·  Problem: Chronic heart failure with preserved ejection fraction.   ·  Plan: TTE done here 1/17/25 technically difficult, but LV systolic fxn appears nl without any regional wall motion abnormalities  - Euvolemic, repeat BNP. On 2/23 2300  - TTE 3/17 1. No obvious Left atrial thrombus; unable to evaluate left atrial appendage on TTE 2. Compared to the transthoracic echocardiogram performed on 2/23/2025, LA difficult to compare.    Problem/Plan - 4:  ·  Problem: New onset Afib RVR (on tele, confirmed with EKG 2/19)   ·  Plan:  - Rec Metoprolol 25mg BID  - Rec Eliquis 5mg BID      Problem/Plan - 5:  ·  Problem: HTN    ·  Plan: Resolved          Magaly Laird, REILLY-NP   Gus Andrew DO [facility]  Cardiovascular Medicine  [address] [place] [address], [address]  Available through call or text on [employer] TEAMs  Office: [phone]

## 2025-03-18 NOTE — SWALLOW FEES ASSESSMENT ADULT - DIAGNOSTIC IMPRESSIONS
Pt presents with an oropharyngeal dysphagia. Pt presents with an oropharyngeal dysphagia. There is premature spillage with thickened liquids and mild delay in pharyngeal swallow trigger. Airway protection is impaired. With purees, there is laryngeal penetration and eventual silent aspiration after the swallow from pharyngeal residue vs reflux of material through the upper esophageal sphincter. Silent aspiration also observed with moderately thick liquids. Cued cough not consistently effective in clearing material from the airway. Given deficits in swallow safety, PEG feeds remain indicated at this time.

## 2025-03-19 LAB
ANION GAP SERPL CALC-SCNC: 9 MMOL/L — SIGNIFICANT CHANGE UP (ref 5–17)
BUN SERPL-MCNC: 24 MG/DL — HIGH (ref 7–23)
CALCIUM SERPL-MCNC: 9.5 MG/DL — SIGNIFICANT CHANGE UP (ref 8.4–10.5)
CHLORIDE SERPL-SCNC: 105 MMOL/L — SIGNIFICANT CHANGE UP (ref 96–108)
CO2 SERPL-SCNC: 25 MMOL/L — SIGNIFICANT CHANGE UP (ref 22–31)
CREAT SERPL-MCNC: 1.51 MG/DL — HIGH (ref 0.5–1.3)
EGFR: 50 ML/MIN/1.73M2 — LOW
EGFR: 50 ML/MIN/1.73M2 — LOW
GLUCOSE BLDC GLUCOMTR-MCNC: 105 MG/DL — HIGH (ref 70–99)
GLUCOSE BLDC GLUCOMTR-MCNC: 110 MG/DL — HIGH (ref 70–99)
GLUCOSE BLDC GLUCOMTR-MCNC: 124 MG/DL — HIGH (ref 70–99)
GLUCOSE BLDC GLUCOMTR-MCNC: 96 MG/DL — SIGNIFICANT CHANGE UP (ref 70–99)
GLUCOSE SERPL-MCNC: 98 MG/DL — SIGNIFICANT CHANGE UP (ref 70–99)
HCT VFR BLD CALC: 28.4 % — LOW (ref 39–50)
HGB BLD-MCNC: 8.9 G/DL — LOW (ref 13–17)
MAGNESIUM SERPL-MCNC: 2 MG/DL — SIGNIFICANT CHANGE UP (ref 1.6–2.6)
MCHC RBC-ENTMCNC: 31.2 PG — SIGNIFICANT CHANGE UP (ref 27–34)
MCHC RBC-ENTMCNC: 31.3 G/DL — LOW (ref 32–36)
MCV RBC AUTO: 99.6 FL — SIGNIFICANT CHANGE UP (ref 80–100)
NRBC BLD AUTO-RTO: 0 /100 WBCS — SIGNIFICANT CHANGE UP (ref 0–0)
PHOSPHATE SERPL-MCNC: 2.4 MG/DL — LOW (ref 2.5–4.5)
PLATELET # BLD AUTO: 172 K/UL — SIGNIFICANT CHANGE UP (ref 150–400)
POTASSIUM SERPL-MCNC: 3.8 MMOL/L — SIGNIFICANT CHANGE UP (ref 3.5–5.3)
POTASSIUM SERPL-SCNC: 3.8 MMOL/L — SIGNIFICANT CHANGE UP (ref 3.5–5.3)
RBC # BLD: 2.85 M/UL — LOW (ref 4.2–5.8)
RBC # FLD: 16.8 % — HIGH (ref 10.3–14.5)
SODIUM SERPL-SCNC: 139 MMOL/L — SIGNIFICANT CHANGE UP (ref 135–145)
WBC # BLD: 7.25 K/UL — SIGNIFICANT CHANGE UP (ref 3.8–10.5)
WBC # FLD AUTO: 7.25 K/UL — SIGNIFICANT CHANGE UP (ref 3.8–10.5)

## 2025-03-19 RX ADMIN — LAMOTRIGINE 50 MILLIGRAM(S): 150 TABLET ORAL at 17:56

## 2025-03-19 RX ADMIN — LEVETIRACETAM 400 MILLIGRAM(S): 10 INJECTION, SOLUTION INTRAVENOUS at 05:53

## 2025-03-19 RX ADMIN — Medication 1000 UNIT(S): at 12:12

## 2025-03-19 RX ADMIN — Medication 40 MILLIGRAM(S): at 12:11

## 2025-03-19 RX ADMIN — METOPROLOL SUCCINATE 25 MILLIGRAM(S): 50 TABLET, EXTENDED RELEASE ORAL at 17:56

## 2025-03-19 RX ADMIN — POLYETHYLENE GLYCOL 3350 17 GRAM(S): 17 POWDER, FOR SOLUTION ORAL at 12:12

## 2025-03-19 RX ADMIN — LAMOTRIGINE 50 MILLIGRAM(S): 150 TABLET ORAL at 05:54

## 2025-03-19 RX ADMIN — NYSTATIN 1 APPLICATION(S): 100000 CREAM TOPICAL at 21:54

## 2025-03-19 RX ADMIN — Medication 1 APPLICATION(S): at 12:15

## 2025-03-19 RX ADMIN — Medication 650 MILLIGRAM(S): at 12:11

## 2025-03-19 RX ADMIN — IPRATROPIUM BROMIDE AND ALBUTEROL SULFATE 3 MILLILITER(S): .5; 2.5 SOLUTION RESPIRATORY (INHALATION) at 05:06

## 2025-03-19 RX ADMIN — METOPROLOL SUCCINATE 25 MILLIGRAM(S): 50 TABLET, EXTENDED RELEASE ORAL at 05:54

## 2025-03-19 RX ADMIN — IPRATROPIUM BROMIDE AND ALBUTEROL SULFATE 3 MILLILITER(S): .5; 2.5 SOLUTION RESPIRATORY (INHALATION) at 17:36

## 2025-03-19 RX ADMIN — LEVETIRACETAM 400 MILLIGRAM(S): 10 INJECTION, SOLUTION INTRAVENOUS at 17:56

## 2025-03-19 RX ADMIN — LURASIDONE HYDROCHLORIDE 20 MILLIGRAM(S): 120 TABLET, FILM COATED ORAL at 21:54

## 2025-03-19 RX ADMIN — GABAPENTIN 150 MILLIGRAM(S): 400 CAPSULE ORAL at 05:54

## 2025-03-19 RX ADMIN — Medication 650 MILLIGRAM(S): at 12:41

## 2025-03-19 RX ADMIN — APIXABAN 5 MILLIGRAM(S): 2.5 TABLET, FILM COATED ORAL at 05:55

## 2025-03-19 RX ADMIN — APIXABAN 5 MILLIGRAM(S): 2.5 TABLET, FILM COATED ORAL at 17:56

## 2025-03-19 RX ADMIN — LACOSAMIDE 140 MILLIGRAM(S): 150 TABLET, FILM COATED ORAL at 17:57

## 2025-03-19 RX ADMIN — IPRATROPIUM BROMIDE AND ALBUTEROL SULFATE 3 MILLILITER(S): .5; 2.5 SOLUTION RESPIRATORY (INHALATION) at 11:27

## 2025-03-19 RX ADMIN — NYSTATIN 1 APPLICATION(S): 100000 CREAM TOPICAL at 05:55

## 2025-03-19 RX ADMIN — NYSTATIN 1 APPLICATION(S): 100000 CREAM TOPICAL at 14:51

## 2025-03-19 RX ADMIN — ESCITALOPRAM OXALATE 15 MILLIGRAM(S): 20 TABLET ORAL at 05:54

## 2025-03-19 RX ADMIN — GABAPENTIN 150 MILLIGRAM(S): 400 CAPSULE ORAL at 17:56

## 2025-03-19 RX ADMIN — LACOSAMIDE 140 MILLIGRAM(S): 150 TABLET, FILM COATED ORAL at 05:54

## 2025-03-19 RX ADMIN — ATORVASTATIN CALCIUM 20 MILLIGRAM(S): 80 TABLET, FILM COATED ORAL at 21:54

## 2025-03-19 NOTE — PROGRESS NOTE ADULT - ASSESSMENT
69 year old male with a past medical history of hypertension, hyperlipemia VAZQUEZ (on CPAP at bedtime, NC 2 liters during the day), CKD stage 3, epilepsy, schizophrenia, developmental delay, metastatic testicular cancer (s/p orchiectomy, actively on chemotherapy, last dose of etoposide/cisplatin on 6/2024), presenting with acute on chronic hypoxemic respiratory failure, secondary to (a) COVID-19 infection, (b) superimposed pneumonia after recent influenza infection,  Transferred to MICU on 2/23 after RRT on floors due to hypoxia even with max setting AVAPS. Patient started on midodrine and weaned off of levo on 2/24. Failed pressure support on 2/25 and started diuresis with Bumex. CTH was unremarkable and weaned off of Precedex on 3/1. On 3/4 patient extubated and C conversations with family revealed that they would want patient to be trached. On 3/7, patient found to have RLL consolidation on POCUS and started on Zosyn, intubated on 3/8 due to increased lethargy and inability to clear oral secretions. Trach placed on 3/12. Transferred to RCU on 3/13. S/p Peg 3/14.  EGD with normal findings.       3/18: No events reported overnight, remains on TC ATC since 3/13 with normal ABG; vent discontinued this morning. TTE performed yesterday no obvious sign of left atrial appendage. Creatinine continues to downtrend today 1.67. Patient evaluated by Cardiology recommended Lopressor 25 mg BID for rate control for AFIB and Eliquis 5 mg bid ( cleared with heme/onc). Patient continues to tolerate speaking valve, FEES performed again this afternoon not cleared for enteral diet but speech will continue to work with patient for therapeutic swallowing exercises. Patient evaluated by Physiatry ongoing evaluation to determine ROCHELLE vs Acute rehab upon discharge.   69 year old male with a past medical history of hypertension, hyperlipemia VAZQUEZ (on CPAP at bedtime, NC 2 liters during the day), CKD stage 3, epilepsy, schizophrenia, developmental delay, metastatic testicular cancer (s/p orchiectomy, actively on chemotherapy, last dose of etoposide/cisplatin on 6/2024), presenting with acute on chronic hypoxemic respiratory failure, secondary to (a) COVID-19 infection, (b) superimposed pneumonia after recent influenza infection,  Transferred to MICU on 2/23 after RRT on floors due to hypoxia even with max setting AVAPS. Patient started on midodrine and weaned off of levo on 2/24. Failed pressure support on 2/25 and started diuresis with Bumex. CTH was unremarkable and weaned off of Precedex on 3/1. On 3/4 patient extubated and GOC conversations with family revealed that they would want patient to be trached. On 3/7, patient found to have RLL consolidation on POCUS and started on Zosyn, intubated on 3/8 due to increased lethargy and inability to clear oral secretions. Trach placed on 3/12. Transferred to RCU on 3/13. S/p Peg 3/14.  EGD with normal findings. Tolerating TC ATC since 3/16.   TTE performed 3/18 no obvious sign of left atrial appendage. Recommended by Cards and Cleared by heme for Eliquis 5mg BID. H/H stable. Tolerating PMV trials. Failed FEES 3/18.      3/19: Failed FEES 3/18, Continuing Therapeutic swallow assessment w/ speech and aggressive PT in hope for possible Acute Rehab.   69 year old male with a past medical history of hypertension, hyperlipemia VAZQUEZ (on CPAP at bedtime, NC 2 liters during the day), CKD stage 3, epilepsy, schizophrenia, developmental delay, metastatic testicular cancer (s/p orchiectomy, actively on chemotherapy, last dose of etoposide/cisplatin on 6/2024), presenting with acute on chronic hypoxemic respiratory failure, secondary to (a) COVID-19 infection, (b) superimposed pneumonia after recent influenza infection,  Transferred to MICU on 2/23 after RRT on floors due to hypoxia even with max setting AVAPS. Patient started on midodrine and weaned off of levo on 2/24. Failed pressure support on 2/25 and started diuresis with Bumex. CTH was unremarkable and weaned off of Precedex on 3/1. On 3/4 patient extubated and GOC conversations with family revealed that they would want patient to be trached. On 3/7, patient found to have RLL consolidation on POCUS and started on Zosyn, intubated on 3/8 due to increased lethargy and inability to clear oral secretions. Trach placed on 3/12. Transferred to RCU on 3/13. S/p Peg 3/14.  EGD with normal findings. Tolerating TC ATC since 3/16.   TTE performed 3/18 no obvious sign of left atrial appendage. Recommended by Cards and Cleared by heme for Eliquis 5mg BID. H/H stable. Tolerating PMV trials. Failed FEES 3/18.      3/19: Failed FEES 3/18, Continuing Therapeutic swallow exercise w/ speech, and aggressive PT in hope for possible Acute Rehab bed. PM& R will continue to follow.

## 2025-03-19 NOTE — PROGRESS NOTE ADULT - ASSESSMENT
Mr. Gr is a 69 year old male with PMHx of seizure disorder, sleep apnea, HTN, chronic idiopathic constipation, stage III CKD, severe obesity, secondary hyperparathyroidism, anemia, thrombocytopenia, hyperlipidemia, testicular cancer under treatment with Dr. Ovalles who is admitted for acute hypoxic respiratory failure secondary to COVID vs superimposed pneumonia with new onset thrombocytopenia. He was recently discharged 02/06/2025 after a tenous stay including intubation in the ICU for respiratory failure in setting of seizure. He had recovered and was discharged to rehab.      Former smoker, quit 14y prior, denied ETOH, drug use. Lives at home. Does not have children. Denies family history of blood disorders or malignancy.  Denies previous workplace related exposures.  Denies previous history of blood transfusions.  Denied history of thromboses.  Denied history of  requiring anticoagulation.     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  Margins were negative.  pT3Nx. CT C/A/P with few left para-aortic and left iliac chain lymph nodes largest measuring 8.1 cm left para-aortic node, bilateral subcentimeter noncalcified pulmonary nodules measuring up to 7 mm. AFP, LDH, hCG were all elevated. 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 Started on adjuvant therapy with Cisplatin+Etoposide, so far completed 4 cycles of treatment with cisplatin dose reduced 50% and Etoposide 50% due to thrombocytopenia.      02/19/2025: on HFNC, noted tachycardia and fever, Klebsiella in urine as well, thrombocytopenia stable/improving, no signs of active bleeding     02/20/2025: developed A.fib with RVR and was placed on heparin drip and pending cardiology eval    02/21/2025: awake, on AVAPS overnight, noted RRT for hypoxia as pt removed HFNC at some point in time, good response thereafter     02/22/2025:  continues on AVAPS this morning, noted RRT overnight for hypoxia, hypercapnia, ICU following, US LE negative for DVT, counts overall stable/improved     02/23/2025: progressive respiratory failure, noted ongoing RTT this morning with pending intubation and transfer to MICU     02/24/2025: intubated 02/23/2025, sedated, on pressor support, no signs of bleeding, counts noted, no signs of bleeding     02/25/2025: continues in MICU, intubated and sedated, no signs of bleeding    02/26/2025: continues in MICU, intubated, without signs of bleeding, continues on heparin drip     02/27/2025:  remains under the care of the ICU, intubated, no signs of bleeding and continues on heparin drip, counts stable, has not required PRBC support this admission    02/28/2025: continues under the care of MICU, continues intubated, no signs of bleeding, on heparin drip    03/01/2025: continues in MICU, intubated, direct josefina IgG positive, eluate negative, not likely to be clinically significant     03/02/2025:  continues in MICU, nursing staff at bedside, no overnight events noted. CTH without acute intracranial pathology     03/03/2025: continues in MICU, intubated, on heparin drip, 1u PRBC overnight, no signs of bleeding, platelet count stable     03/04/2025: awake, moving arms spontaneously on exam, continues without much interaction however. No signs of bleeding, continues heparin drip, continues intubated in MICU     03/05/2025: extubated 03/04/2025, continues in MICU, awake and alert this morning and able to speak full sentences, discussed/reviewed findings, continues on heparin drip     03/06/2025: nursing staff at bedside, bipap overnight, without signs of bleeding, counts noted stable, renal function improving     03/07/2025:  no signs of bleeding, counts noted, continues heparin drip, continues in MICU    03/08/2025: on HFNC, no signs of bleeding, although alert and answering questions, not back to his baseline yet, continues in MICU    03/09/2025: continues in MICU, s/p re-intubation 03/08/2025 given respiratory compromise in the setting of copious secretions as evidenced by bronchoscopy, in setting of fever, now sedated, mild crusting of blood around mouth, without active sign of bleeding, counts noted reviewed, continues on heparin drip     03/10/2025: continues in MICU, no signs of bleeding, discussed with nursing staff at bedside, receiving 1u PRBC due to H/H downtrend, sputum culture with Pseudomonas, ICU and ID recs noted, continues to be intubated     03/11/2025: continues in MICU, intubated and sedated, noted counts, without signs of bleeding, appropriate response to transfusion    03/12/2025: continues in MICU, intubated and sedated, without signs of bleeding     03/13/2025: continues in MICU, s/p Tracheostomy 03/12/2025 with tracheal intubation, continues with sedation     03/14/2025:  awake and alert, mental status much improved, transferred from MICU to [address] [place] [address] 03/13/2025. Without significant events overnight, discussed with nursing staff at bedside, stated pt did well overnight, no signs of bleeding, no fever noted, medications provided via NGT and he is pending PEG-Tube placement today. His case is complex, noted with repeat need for intubations, discussed with pt, he is aware. Discussed with pts primary Oncologist as well.    03/15/2025: awake and alert, no overnight events noted, discussed with nursing staff, he did well overnight, no fevers, no signs of bleeding endorsed. He nods to information. Counts reviewed overall stable, discussed with him as well. Had PEG placed 03/14/2025 03/16/2025: no overnight events noted, comfortable overnight, nursing staff at bedside, stated pt did well, no signs of bleeding, had a BM. No fever reported. Counts reviewed, stable at this time     03/17/2025: no overnight events noted however continues with need for suctioning due to increase secretions, discussed with overnight staff, mild bleeding likely due to trauma from suctioning and recent tracheostomy placement without evidence of active bleeding otherwise. No fevers noted overnight. Had multiple smaller BMs, without evidence of bleeding, Nephrology recs and reviewed noted for Epo    03/18/2025: without significant overnight events, discussed with team at bedside, no evidence of bleeding, noted cardiology recs to start Eliquis 5mg BID for A.fib, currently on Lovenox, previously did require PRBC this admission but has since been stable, renal function seems to be improving, and Plt count improved, ok for AC per cardiology recs with Eliquis, monitor H/H closely         #Acute hypoxic respiratory failure, Acute, Severe   # COVID  - CT noted with bilateral GGO  - ID following  - Pulmonary following  - Antibiotics per primary team/MICU and ID   - Intubated 02/23/2025 AM, MICU   - extubated 03/04/2025  - Pseudomonas from sputum culture   - Re-intubated 03/09/2025 due to increased work of breathing in setting of increased secretions, not protecting airway, tachypnea, hypoxia   - s/p Tracheostomy 03/12/2025    # Thrombocytopenia, Acute, Moderate   - Did occur during most recent admission and improved to normal prior to discharge   - Without signs of bleeding  - Could be multifactorial, possibly due to infection   - Continue to trend  - Transfuse to maintain Plt > 10k unless actively bleeding then maintain > 50k     # Brain lesion, Acute, Severe   - pt with hx of seizures  - MRI brain now with 5.4 mm enhancing lesion in the LEFT middle cerebellar peduncle with associated edema suspicious for metastases  - MRI Lumbar negative for acute disease  - Small lesion without mass effect or edema, recommended to correlate per neurology if foci and locus are concordant with seizure activity  - Neurology recs noted, new lesion not likely to cause seizure  - It is rare, but nonetheless not impossible, for testicular cancer to be metastatic to the brain  - He will need another PET-CT, can be completed outpatient once stable if concern can proceed with biopsy at that time   - Previous endocrinology eval for thyroid nodule noted  - AFP/[facility] normal,  previously   - Repeat CTH without acute intracranial pathology     # 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 Ovalles, agree with current plan  - Follow up as outpatient with Franki Ovalles MD     # Acute kidney injury on CKD Stage IIIA, Acute, Moderate   - Likely multifactorial  - Nephrology consult and recs noted  - On lovenox for DVT PPx, monitor renal function   - Continue to trend     # Normocytic anemia, Acute, Severe   - Chronic, has hx of PRBC during chemo 07/2024  - Noted Anti E, Anti-K Anti-C antibodies previously  - direct josefina IgG positive, eluate negative, not likely to be clinically significant   - s/p 2u PRBC 03/03/2025 and 03/10/2025   - Monitor for bleeding  - Epo per Nephrology   - Recommend to transfuse to maintain Hb > 7    # Klebsiella UTI, Acute, Severe > Moderate   # Pseudomonas UTI   -Antibiotics per ID and primary team       Recommendations  - Trend CBC daily, obtain post transfusion CBC today   - Transfuse to maintain Hb > 7   - Transfuse to maintain Plt >10k unless active bleeding then >50k   - On lovenox for DVT PPx, monitor renal function   - Cardiology follow up to address therapeutic anticoagulation, without current evidence of bleeding, noted cardiology recs to start Eliquis 5mg BID for A.fib, currently on Lovenox, previously did require PRBC this admission but has since been stable, renal function seems to be improving, and Plt count improved, ok for AC per cardiology recs with Eliquis, monitor H/H closely         Thank you for allowing me to participate in the care of Mr. Gr please do not hesitate to call or text me if you have further questions or concerns.     Brayan Mart MD  [facility] [place]   Division of Hematology/Oncology  [address] [place] [address], [address]  [place] [address]  P: [phone]  F: [phone]    Attestation:    ----Pt evaluated, >50min spent including face-to-face interaction in addition to chart review, reviewing treatment plan, discussing with care staff in hospital, his outside physician, and managing the patient’s chronic diagnoses as listed in the assessment----        Mr. Gr is a 69 year old male with PMHx of seizure disorder, sleep apnea, HTN, chronic idiopathic constipation, stage III CKD, severe obesity, secondary hyperparathyroidism, anemia, thrombocytopenia, hyperlipidemia, testicular cancer under treatment with Dr. Ovalles who is admitted for acute hypoxic respiratory failure secondary to COVID vs superimposed pneumonia with new onset thrombocytopenia. He was recently discharged 02/06/2025 after a tenous stay including intubation in the ICU for respiratory failure in setting of seizure. He had recovered and was discharged to rehab.      Former smoker, quit 14y prior, denied ETOH, drug use. Lives at home. Does not have children. Denies family history of blood disorders or malignancy.  Denies previous workplace related exposures.  Denies previous history of blood transfusions.  Denied history of thromboses.  Denied history of  requiring anticoagulation.     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  Margins were negative.  pT3Nx. CT C/A/P with few left para-aortic and left iliac chain lymph nodes largest measuring 8.1 cm left para-aortic node, bilateral subcentimeter noncalcified pulmonary nodules measuring up to 7 mm. AFP, LDH, hCG were all elevated. 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 Started on adjuvant therapy with Cisplatin+Etoposide, so far completed 4 cycles of treatment with cisplatin dose reduced 50% and Etoposide 50% due to thrombocytopenia.      02/19/2025: on HFNC, noted tachycardia and fever, Klebsiella in urine as well, thrombocytopenia stable/improving, no signs of active bleeding     02/20/2025: developed A.fib with RVR and was placed on heparin drip and pending cardiology eval    02/21/2025: awake, on AVAPS overnight, noted RRT for hypoxia as pt removed HFNC at some point in time, good response thereafter     02/22/2025:  continues on AVAPS this morning, noted RRT overnight for hypoxia, hypercapnia, ICU following, US LE negative for DVT, counts overall stable/improved     02/23/2025: progressive respiratory failure, noted ongoing RTT this morning with pending intubation and transfer to MICU     02/24/2025: intubated 02/23/2025, sedated, on pressor support, no signs of bleeding, counts noted, no signs of bleeding     02/25/2025: continues in MICU, intubated and sedated, no signs of bleeding    02/26/2025: continues in MICU, intubated, without signs of bleeding, continues on heparin drip     02/27/2025:  remains under the care of the ICU, intubated, no signs of bleeding and continues on heparin drip, counts stable, has not required PRBC support this admission    02/28/2025: continues under the care of MICU, continues intubated, no signs of bleeding, on heparin drip    03/01/2025: continues in MICU, intubated, direct josefina IgG positive, eluate negative, not likely to be clinically significant     03/02/2025:  continues in MICU, nursing staff at bedside, no overnight events noted. CTH without acute intracranial pathology     03/03/2025: continues in MICU, intubated, on heparin drip, 1u PRBC overnight, no signs of bleeding, platelet count stable     03/04/2025: awake, moving arms spontaneously on exam, continues without much interaction however. No signs of bleeding, continues heparin drip, continues intubated in MICU     03/05/2025: extubated 03/04/2025, continues in MICU, awake and alert this morning and able to speak full sentences, discussed/reviewed findings, continues on heparin drip     03/06/2025: nursing staff at bedside, bipap overnight, without signs of bleeding, counts noted stable, renal function improving     03/07/2025:  no signs of bleeding, counts noted, continues heparin drip, continues in MICU    03/08/2025: on HFNC, no signs of bleeding, although alert and answering questions, not back to his baseline yet, continues in MICU    03/09/2025: continues in MICU, s/p re-intubation 03/08/2025 given respiratory compromise in the setting of copious secretions as evidenced by bronchoscopy, in setting of fever, now sedated, mild crusting of blood around mouth, without active sign of bleeding, counts noted reviewed, continues on heparin drip     03/10/2025: continues in MICU, no signs of bleeding, discussed with nursing staff at bedside, receiving 1u PRBC due to H/H downtrend, sputum culture with Pseudomonas, ICU and ID recs noted, continues to be intubated     03/11/2025: continues in MICU, intubated and sedated, noted counts, without signs of bleeding, appropriate response to transfusion    03/12/2025: continues in MICU, intubated and sedated, without signs of bleeding     03/13/2025: continues in MICU, s/p Tracheostomy 03/12/2025 with tracheal intubation, continues with sedation     03/14/2025:  awake and alert, mental status much improved, transferred from MICU to [address] [place] [address] 03/13/2025. Without significant events overnight, discussed with nursing staff at bedside, stated pt did well overnight, no signs of bleeding, no fever noted, medications provided via NGT and he is pending PEG-Tube placement today. His case is complex, noted with repeat need for intubations, discussed with pt, he is aware. Discussed with pts primary Oncologist as well.    03/15/2025: awake and alert, no overnight events noted, discussed with nursing staff, he did well overnight, no fevers, no signs of bleeding endorsed. He nods to information. Counts reviewed overall stable, discussed with him as well. Had PEG placed 03/14/2025 03/16/2025: no overnight events noted, comfortable overnight, nursing staff at bedside, stated pt did well, no signs of bleeding, had a BM. No fever reported. Counts reviewed, stable at this time     03/17/2025: no overnight events noted however continues with need for suctioning due to increase secretions, discussed with overnight staff, mild bleeding likely due to trauma from suctioning and recent tracheostomy placement without evidence of active bleeding otherwise. No fevers noted overnight. Had multiple smaller BMs, without evidence of bleeding, Nephrology recs and reviewed noted for Epo    03/18/2025: without significant overnight events, discussed with team at bedside, no evidence of bleeding, noted cardiology recs to start Eliquis 5mg BID for A.fib, currently on Lovenox, previously did require PRBC this admission but has since been stable, renal function seems to be improving, and Plt count improved, ok for AC per cardiology recs with Eliquis, monitor H/H closely     03/19/2025: comfortable, no overnight events noted, discussed with team, had 1 BM overnight without evidence of bleeding, now on Eliquis 5mg BID per cardiology recs for A.fib, renal function stable. Oxygen requirements stable overnight. No fevers reported. Pts primary Oncologist, Dr. Ovalles, aware, case discussed         #Acute hypoxic respiratory failure, Acute, Severe   # COVID  - CT noted with bilateral GGO  - ID following  - Pulmonary following  - Antibiotics per primary team/MICU and ID   - Intubated 02/23/2025 AM, MICU   - extubated 03/04/2025  - Pseudomonas from sputum culture   - Re-intubated 03/09/2025 due to increased work of breathing in setting of increased secretions, not protecting airway, tachypnea, hypoxia   - s/p Tracheostomy 03/12/2025    # Thrombocytopenia, Acute, Moderate   - Did occur during most recent admission and improved to normal prior to discharge   - Without signs of bleeding  - Could be multifactorial, possibly due to infection   - Continue to trend  - Transfuse to maintain Plt > 10k unless actively bleeding then maintain > 50k     # Brain lesion, Acute, Severe   - pt with hx of seizures  - MRI brain now with 5.4 mm enhancing lesion in the LEFT middle cerebellar peduncle with associated edema suspicious for metastases  - MRI Lumbar negative for acute disease  - Small lesion without mass effect or edema, recommended to correlate per neurology if foci and locus are concordant with seizure activity  - Neurology recs noted, new lesion not likely to cause seizure  - It is rare, but nonetheless not impossible, for testicular cancer to be metastatic to the brain  - He will need another PET-CT, can be completed outpatient once stable if concern can proceed with biopsy at that time   - Previous endocrinology eval for thyroid nodule noted  - AFP/BHCH normal,  previously   - Repeat CTH without acute intracranial pathology     # 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 Ovalles, agree with current plan  - Follow up as outpatient with Franki Ovalles MD     # Acute kidney injury on CKD Stage IIIA, Acute, Moderate   - Likely multifactorial  - Nephrology consult and recs noted  - On lovenox for DVT PPx, monitor renal function   - Continue to trend     # Normocytic anemia, Acute, Severe   - Chronic, has hx of PRBC during chemo 07/2024  - Noted Anti E, Anti-K Anti-C antibodies previously  - direct josefina IgG positive, eluate negative, not likely to be clinically significant   - s/p 2u PRBC 03/03/2025 and 03/10/2025   - Monitor for bleeding  - Epo per Nephrology   - Recommend to transfuse to maintain Hb > 7    # Klebsiella UTI, Acute, Severe > Moderate   # Pseudomonas UTI   -Antibiotics per ID and primary team       Recommendations  - Trend CBC daily, obtain post transfusion CBC today   - Transfuse to maintain Hb > 7   - Transfuse to maintain Plt >10k unless active bleeding then >50k   - Cardiologyy recs noted, now on Eliquis 5mg BID for A.fib, monitor H/H         Thank you for allowing me to participate in the care of Mr. Gr please do not hesitate to call or text me if you have further questions or concerns.     Brayan Mart MD  [facility] [place]   Division of Hematology/Oncology  [address] [place] [address], [address]  [place] [address]  P: [phone]  F: [phone]    Attestation:    ----Pt evaluated, >50min spent including face-to-face interaction in addition to chart review, reviewing treatment plan, discussing with care staff in hospital, his outside physician, and managing the patient’s chronic diagnoses as listed in the assessment----

## 2025-03-19 NOTE — PROGRESS NOTE ADULT - SUBJECTIVE AND OBJECTIVE BOX
[facility]   [phone]  RENAL FOLLOW UP NOTE  --------------------------------------------------------------------------------  Chief Complaint:    24 hour events/subjective:    seen earlier   trach collar     PAST HISTORY  --------------------------------------------------------------------------------  No significant changes to PMH, PSH, FHx, SHx, unless otherwise noted    ALLERGIES & MEDICATIONS  --------------------------------------------------------------------------------  Allergies    seasonal allergies (Unknown)  No Known Drug Allergies    Intolerances    latex (Rash)    Standing Inpatient Medications  albuterol/ipratropium for Nebulization 3 milliLiter(s) Nebulizer every 6 hours  apixaban 5 milliGRAM(s) Enteral Tube every 12 hours  atorvastatin 20 milliGRAM(s) Oral at bedtime  chlorhexidine 2% Cloths 1 Application(s) Topical <User Schedule>  cholecalciferol 1000 Unit(s) Oral daily  epoetin krystyna-epbx (RETACRIT) Injectable 99675 Unit(s) SubCutaneous <User Schedule>  escitalopram 15 milliGRAM(s) Oral with breakfast  gabapentin Solution 150 milliGRAM(s) Oral every 12 hours  influenza  Vaccine (HIGH DOSE) 0.5 milliLiter(s) IntraMuscular once  lacosamide IVPB 200 milliGRAM(s) IV Intermittent every 12 hours  lamoTRIgine 50 milliGRAM(s) Oral two times a day  lanolin Ointment 1 Application(s) Topical daily  levETIRAcetam  IVPB 750 milliGRAM(s) IV Intermittent every 12 hours  lurasidone 20 milliGRAM(s) Oral at bedtime  metoprolol tartrate 25 milliGRAM(s) Oral every 12 hours  nystatin Powder 1 Application(s) Topical every 8 hours  pantoprazole  Injectable 40 milliGRAM(s) IV Push daily  polyethylene glycol 3350 17 Gram(s) Oral daily    PRN Inpatient Medications  acetaminophen     Tablet .. 650 milliGRAM(s) Oral every 6 hours PRN  aluminum hydroxide/magnesium hydroxide/simethicone Suspension 30 milliLiter(s) Oral every 4 hours PRN  artificial tears (preservative free) Ophthalmic Solution 1 Drop(s) Both EYES every 6 hours PRN  melatonin 3 milliGRAM(s) Oral at bedtime PRN  ondansetron Injectable 4 milliGRAM(s) IV Push every 8 hours PRN      REVIEW OF SYSTEMS  --------------------------------------------------------------------------------        VITALS/PHYSICAL EXAM  --------------------------------------------------------------------------------  T(C): 36.5 (03-19-25 @ 17:54), Max: 36.6 (03-19-25 @ 00:02)  HR: 84 (03-19-25 @ 17:59) (82 - 98)  BP: 146/81 (03-19-25 @ 17:54) (135/83 - 174/96)  RR: 18 (03-19-25 @ 17:54) (18 - 20)  SpO2: 100% (03-19-25 @ 19:30) (95% - 100%)  Wt(kg): --        03-18-25 @ 07:01  -  03-19-25 @ 07:00  --------------------------------------------------------  IN: 840 mL / OUT: 900 mL / NET: -60 mL    03-19-25 @ 07:01  -  03-19-25 @ 23:28  --------------------------------------------------------  IN: 870 mL / OUT: 300 mL / NET: 570 mL      Physical Exam:  	    Gen: ill appearing male, trach collar  	Pulm: decrease bs, +coarse bs    	CV: No JVD. RRR, S1S2; no rub  	Abd: +BS, soft, nondistended, Peg  	UE: Warm, no cyanosis  no clubbing,  no edema;  	LE: Warm, no cyanosis  no clubbing, no edema    LABS/STUDIES  --------------------------------------------------------------------------------              8.9    7.25  >-----------<  172      [03-19-25 @ 07:05]              28.4     139  |  105  |  24  ----------------------------<  98      [03-19-25 @ 07:07]  3.8   |  25  |  1.51        Ca     9.5     [03-19-25 @ 07:07]      Mg     2.0     [03-19-25 @ 07:07]      Phos  2.4     [03-19-25 @ 07:07]            Creatinine Trend:  SCr 1.51 [03-19 @ 07:07]  SCr 1.67 [03-18 @ 07:13]  SCr 1.73 [03-17 @ 06:35]  SCr 2.01 [03-16 @ 09:29]  SCr 2.28 [03-15 @ 09:38]            Ferritin [id number]      [02-23-25 @ 06:15]  TSH 0.87      [01-25-25 @ 11:31]

## 2025-03-19 NOTE — PROGRESS NOTE ADULT - SUBJECTIVE AND OBJECTIVE BOX
[facility]IENT PROGRESS NOTE     Interval Hx:   03-19-25: Mr. Gr was seen at bedside today.    Meds:   MEDICATIONS  (STANDING):  albuterol/ipratropium for Nebulization 3 milliLiter(s) Nebulizer every 6 hours  apixaban 5 milliGRAM(s) Enteral Tube every 12 hours  atorvastatin 20 milliGRAM(s) Oral at bedtime  chlorhexidine 2% Cloths 1 Application(s) Topical <User Schedule>  cholecalciferol 1000 Unit(s) Oral daily  epoetin krystyna-epbx (RETACRIT) Injectable 93773 Unit(s) SubCutaneous <User Schedule>  escitalopram 15 milliGRAM(s) Oral with breakfast  gabapentin Solution 150 milliGRAM(s) Oral every 12 hours  influenza  Vaccine (HIGH DOSE) 0.5 milliLiter(s) IntraMuscular once  lacosamide IVPB 200 milliGRAM(s) IV Intermittent every 12 hours  lamoTRIgine 50 milliGRAM(s) Oral two times a day  lanolin Ointment 1 Application(s) Topical daily  levETIRAcetam  IVPB 750 milliGRAM(s) IV Intermittent every 12 hours  lurasidone 20 milliGRAM(s) Oral at bedtime  metoprolol tartrate 25 milliGRAM(s) Oral every 12 hours  nystatin Powder 1 Application(s) Topical every 8 hours  pantoprazole  Injectable 40 milliGRAM(s) IV Push daily  polyethylene glycol 3350 17 Gram(s) Oral daily    MEDICATIONS  (PRN):  acetaminophen     Tablet .. 650 milliGRAM(s) Oral every 6 hours PRN Temp greater or equal to 38C (100.4F), Mild Pain (1 - 3)  aluminum hydroxide/magnesium hydroxide/simethicone Suspension 30 milliLiter(s) Oral every 4 hours PRN Dyspepsia  artificial tears (preservative free) Ophthalmic Solution 1 Drop(s) Both EYES every 6 hours PRN Dry Eyes  melatonin 3 milliGRAM(s) Oral at bedtime PRN Insomnia  ondansetron Injectable 4 milliGRAM(s) IV Push every 8 hours PRN Nausea and/or Vomiting    Vital Signs Last 24 Hrs  T(C): 36.3 (19 Mar 2025 04:49), Max: 37.2 (18 Mar 2025 05:36)  T(F): 97.3 (19 Mar 2025 04:49), Max: 99 (18 Mar 2025 05:36)  HR: 98 (19 Mar 2025 04:49) (87 - 100)  BP: 174/96 (19 Mar 2025 04:49) (139/86 - 174/96)  BP(mean): --  RR: 18 (19 Mar 2025 04:49) (18 - 20)  SpO2: 100% (19 Mar 2025 04:49) (95% - 100%)    Parameters below as of 19 Mar 2025 04:49  Patient On (Oxygen Delivery Method): tracheostomy collar    Physical Exam:  Gen: NAD, tracheostomy with ETT  HEENT: EOMI, MMM  Chest: equal chest rise  Cardiac: regular   Abd: non distended    Labs:                        8.5    7.13  )-----------( 180      ( 18 Mar 2025 07:13 )             26.9     CBC Full  -  ( 18 Mar 2025 07:13 )  WBC Count : 7.13 K/uL  RBC Count : 2.72 M/uL  Hemoglobin : 8.5 g/dL  Hematocrit : 26.9 %  Platelet Count - Automated : 180 K/uL  Mean Cell Volume : 98.9 fl  Mean Cell Hemoglobin : 31.3 pg  Mean Cell Hemoglobin Concentration : 31.6 g/dL    03-18    141  |  107  |  23  ----------------------------<  97  3.6   |  24  |  1.67[H]    Ca    9.5      18 Mar 2025 07:13  Phos  2.5     03-18  Mg     2.2     03-18         [facility] INPATIENT PROGRESS NOTE     Interval Hx:   03-19-25: Mr. Gr was seen at bedside today, comfortable, no overnight events noted, discussed with team, had 1 BM overnight without evidence of bleeding, now on Eliquis 5mg BID per cardiology recs for A.fib, renal function stable. Oxygen requirements stable overnight. No fevers reported. Pts primary Oncologist, Dr. Ovalles, aware, case discussed     Meds:   MEDICATIONS  (STANDING):  albuterol/ipratropium for Nebulization 3 milliLiter(s) Nebulizer every 6 hours  apixaban 5 milliGRAM(s) Enteral Tube every 12 hours  atorvastatin 20 milliGRAM(s) Oral at bedtime  chlorhexidine 2% Cloths 1 Application(s) Topical <User Schedule>  cholecalciferol 1000 Unit(s) Oral daily  epoetin krystyna-epbx (RETACRIT) Injectable 22967 Unit(s) SubCutaneous <User Schedule>  escitalopram 15 milliGRAM(s) Oral with breakfast  gabapentin Solution 150 milliGRAM(s) Oral every 12 hours  influenza  Vaccine (HIGH DOSE) 0.5 milliLiter(s) IntraMuscular once  lacosamide IVPB 200 milliGRAM(s) IV Intermittent every 12 hours  lamoTRIgine 50 milliGRAM(s) Oral two times a day  lanolin Ointment 1 Application(s) Topical daily  levETIRAcetam  IVPB 750 milliGRAM(s) IV Intermittent every 12 hours  lurasidone 20 milliGRAM(s) Oral at bedtime  metoprolol tartrate 25 milliGRAM(s) Oral every 12 hours  nystatin Powder 1 Application(s) Topical every 8 hours  pantoprazole  Injectable 40 milliGRAM(s) IV Push daily  polyethylene glycol 3350 17 Gram(s) Oral daily    MEDICATIONS  (PRN):  acetaminophen     Tablet .. 650 milliGRAM(s) Oral every 6 hours PRN Temp greater or equal to 38C (100.4F), Mild Pain (1 - 3)  aluminum hydroxide/magnesium hydroxide/simethicone Suspension 30 milliLiter(s) Oral every 4 hours PRN Dyspepsia  artificial tears (preservative free) Ophthalmic Solution 1 Drop(s) Both EYES every 6 hours PRN Dry Eyes  melatonin 3 milliGRAM(s) Oral at bedtime PRN Insomnia  ondansetron Injectable 4 milliGRAM(s) IV Push every 8 hours PRN Nausea and/or Vomiting    Vital Signs Last 24 Hrs  T(C): 36.3 (19 Mar 2025 04:49), Max: 37.2 (18 Mar 2025 05:36)  T(F): 97.3 (19 Mar 2025 04:49), Max: 99 (18 Mar 2025 05:36)  HR: 98 (19 Mar 2025 04:49) (87 - 100)  BP: 174/96 (19 Mar 2025 04:49) (139/86 - 174/96)  BP(mean): --  RR: 18 (19 Mar 2025 04:49) (18 - 20)  SpO2: 100% (19 Mar 2025 04:49) (95% - 100%)    Parameters below as of 19 Mar 2025 04:49  Patient On (Oxygen Delivery Method): tracheostomy collar    Physical Exam:  Gen: NAD, tracheostomy with ETT  HEENT: EOMI, MMM  Chest: equal chest rise  Cardiac: regular   Abd: non distended    Labs:                        8.5    7.13  )-----------( 180      ( 18 Mar 2025 07:13 )             26.9     CBC Full  -  ( 18 Mar 2025 07:13 )  WBC Count : 7.13 K/uL  RBC Count : 2.72 M/uL  Hemoglobin : 8.5 g/dL  Hematocrit : 26.9 %  Platelet Count - Automated : 180 K/uL  Mean Cell Volume : 98.9 fl  Mean Cell Hemoglobin : 31.3 pg  Mean Cell Hemoglobin Concentration : 31.6 g/dL    03-18    141  |  107  |  23  ----------------------------<  97  3.6   |  24  |  1.67[H]    Ca    9.5      18 Mar 2025 07:13  Phos  2.5     03-18  Mg     2.2     03-18

## 2025-03-19 NOTE — PROGRESS NOTE ADULT - SUBJECTIVE AND OBJECTIVE BOX
Patient is a 69y old  Male who presents with a chief complaint of Acute on chronic hypoxemic respiratory failure (19 Mar 2025 05:06)      Interval Events:    REVIEW OF SYSTEMS:  [ ] Positive  [ ] All other systems negative  [ ] Unable to assess ROS because ________    Vital Signs Last 24 Hrs  T(C): 36.3 (03-19-25 @ 04:49), Max: 37.1 (03-18-25 @ 11:11)  T(F): 97.3 (03-19-25 @ 04:49), Max: 98.7 (03-18-25 @ 11:11)  HR: 91 (03-19-25 @ 05:06) (87 - 100)  BP: 174/96 (03-19-25 @ 04:49) (139/86 - 174/96)  RR: 18 (03-19-25 @ 04:49) (18 - 20)  SpO2: 99% (03-19-25 @ 05:06) (95% - 100%)PHYSICAL EXAM:  HEENT:   [ ]Tracheostomy:  [ ]Pupils equal  [ ]No oral lesions  [ ]Abnormal        SKIN  [ ]No Rash  [ ] Abnormal  [ ] pressure    CARDIAC  [ ]Regular  [ ]Abnormal    PULMONARY  [ ]Bilateral Clear Breath Sounds  [ ]Normal Excursion  [ ]Abnormal    GI  [ ]PEG      [ ] +BS		              [ ]Soft, nondistended, nontender	  [ ]Abnormal    MUSCULOSKELETAL                                   [ ]Bedbound                 [ ]Abnormal    [ ]Ambulatory/OOB to chair                           EXTREMITIES                                         [ ]Normal  [ ]Edema                           NEUROLOGIC  [ ] Normal, non focal  [ ] Focal findings:    PSYCHIATRIC  [ ]Alert and appropriate  [ ] Sedated	 [ ]Agitated    :  [place]: [ ] Yes, if yes: Date of Placement:                   [  ] No    LINES: Central Lines [ ] Yes, if yes: Date of Placement                                     [  ] No    HOSPITAL MEDICATIONS:  MEDICATIONS  (STANDING):  albuterol/ipratropium for Nebulization 3 milliLiter(s) Nebulizer every 6 hours  apixaban 5 milliGRAM(s) Enteral Tube every 12 hours  atorvastatin 20 milliGRAM(s) Oral at bedtime  chlorhexidine 2% Cloths 1 Application(s) Topical <User Schedule>  cholecalciferol 1000 Unit(s) Oral daily  epoetin krystyna-epbx (RETACRIT) Injectable 21173 Unit(s) SubCutaneous <User Schedule>  escitalopram 15 milliGRAM(s) Oral with breakfast  gabapentin Solution 150 milliGRAM(s) Oral every 12 hours  influenza  Vaccine (HIGH DOSE) 0.5 milliLiter(s) IntraMuscular once  lacosamide IVPB 200 milliGRAM(s) IV Intermittent every 12 hours  lamoTRIgine 50 milliGRAM(s) Oral two times a day  lanolin Ointment 1 Application(s) Topical daily  levETIRAcetam  IVPB 750 milliGRAM(s) IV Intermittent every 12 hours  lurasidone 20 milliGRAM(s) Oral at bedtime  metoprolol tartrate 25 milliGRAM(s) Oral every 12 hours  nystatin Powder 1 Application(s) Topical every 8 hours  pantoprazole  Injectable 40 milliGRAM(s) IV Push daily  polyethylene glycol 3350 17 Gram(s) Oral daily    MEDICATIONS  (PRN):  acetaminophen     Tablet .. 650 milliGRAM(s) Oral every 6 hours PRN Temp greater or equal to 38C (100.4F), Mild Pain (1 - 3)  aluminum hydroxide/magnesium hydroxide/simethicone Suspension 30 milliLiter(s) Oral every 4 hours PRN Dyspepsia  artificial tears (preservative free) Ophthalmic Solution 1 Drop(s) Both EYES every 6 hours PRN Dry Eyes  melatonin 3 milliGRAM(s) Oral at bedtime PRN Insomnia  ondansetron Injectable 4 milliGRAM(s) IV Push every 8 hours PRN Nausea and/or Vomiting      LABS:                        8.9    7.25  )-----------( 172      ( 19 Mar 2025 07:05 )             28.4     03-19    139  |  105  |  24[H]  ----------------------------<  98  3.8   |  25  |  1.51[H]    Ca    9.5      19 Mar 2025 07:07  Phos  2.4     03-19  Mg     2.0     03-19        Urinalysis Basic - ( 19 Mar 2025 07:07 )    Color: x / Appearance: x / SG: x / pH: x  Gluc: 98 mg/dL / Ketone: x  / Bili: x / Urobili: x   Blood: x / Protein: x / Nitrite: x   Leuk Esterase: x / RBC: x / WBC x   Sq Epi: x / Non Sq Epi: x / Bacteria: x      Arterial Blood Gas:  03-18 @ 05:47  7.36/46/118/26/100.0/0.3  ABG lactate: --  Arterial Blood Gas:  03-17 @ 11:00  7.37/43/140/25/99.0/-0.6  ABG lactate: --      CAPILLARY BLOOD GLUCOSE    MICROBIOLOGY:     RADIOLOGY:  [ ] Reviewed and interpreted by me     Patient is a 69y old  Male who presents with a chief complaint of Acute on chronic hypoxemic respiratory failure (19 Mar 2025 05:06)      Interval Events: Remains stable on continuous TC     REVIEW OF SYSTEMS:  [ ] Positive  [x ] All other systems negative; Communicates w/ PMV   [ ] Unable to assess ROS because ________    Vital Signs Last 24 Hrs  T(C): 36.3 (03-19-25 @ 04:49), Max: 37.1 (03-18-25 @ 11:11)  T(F): 97.3 (03-19-25 @ 04:49), Max: 98.7 (03-18-25 @ 11:11)  HR: 91 (03-19-25 @ 05:06) (87 - 100)  BP: 174/96 (03-19-25 @ 04:49) (139/86 - 174/96)  RR: 18 (03-19-25 @ 04:49) (18 - 20)  SpO2: 99% (03-19-25 @ 05:06) (95% - 100%)    PHYSICAL EXAM:    HEENT:   [X]Tracheostomy: #7 cuffed portex   [X]Pupils equal  [ ]No oral lesions  [ ]Abnormal    SKIN  [ ]No Rash  [X] Abnormal: macerated skin noted on perineum, buttocks, and inner thighs; Lt occipital scalp injury   [ ] pressure    CARDIAC  [X]Regular  [ ]Abnormal    PULMONARY  [X]Bilateral Clear Breath Sounds  [ ]Normal Excursion  [ ]Abnormal    GI  [X]PEG site c/d/i      [X] +BS		              [X]Soft, nondistended, nontender	  [ ]Abnormal    MUSCULOSKELETAL                                   [X]Bedbound                 [ ]Abnormal    [ ]Ambulatory/OOB to chair                           EXTREMITIES                                         [X]Normal  [ ]Edema                           NEUROLOGIC  [X] Normal, non focal  [ ] Focal findings:    PSYCHIATRIC  [X]Alert and appropriate Mood   [ ] Sedated	 [ ]Agitated    :  [place]: [ ] Yes, if yes: Date of Placement:                   [X] No    LINES: Central Lines [ ] Yes, if yes: Date of Placement                                     [X] No      HOSPITAL MEDICATIONS:  MEDICATIONS  (STANDING):  albuterol/ipratropium for Nebulization 3 milliLiter(s) Nebulizer every 6 hours  apixaban 5 milliGRAM(s) Enteral Tube every 12 hours  atorvastatin 20 milliGRAM(s) Oral at bedtime  chlorhexidine 2% Cloths 1 Application(s) Topical <User Schedule>  cholecalciferol 1000 Unit(s) Oral daily  epoetin krystyna-epbx (RETACRIT) Injectable 45989 Unit(s) SubCutaneous <User Schedule>  escitalopram 15 milliGRAM(s) Oral with breakfast  gabapentin Solution 150 milliGRAM(s) Oral every 12 hours  influenza  Vaccine (HIGH DOSE) 0.5 milliLiter(s) IntraMuscular once  lacosamide IVPB 200 milliGRAM(s) IV Intermittent every 12 hours  lamoTRIgine 50 milliGRAM(s) Oral two times a day  lanolin Ointment 1 Application(s) Topical daily  levETIRAcetam  IVPB 750 milliGRAM(s) IV Intermittent every 12 hours  lurasidone 20 milliGRAM(s) Oral at bedtime  metoprolol tartrate 25 milliGRAM(s) Oral every 12 hours  nystatin Powder 1 Application(s) Topical every 8 hours  pantoprazole  Injectable 40 milliGRAM(s) IV Push daily  polyethylene glycol 3350 17 Gram(s) Oral daily    MEDICATIONS  (PRN):  acetaminophen     Tablet .. 650 milliGRAM(s) Oral every 6 hours PRN Temp greater or equal to 38C (100.4F), Mild Pain (1 - 3)  aluminum hydroxide/magnesium hydroxide/simethicone Suspension 30 milliLiter(s) Oral every 4 hours PRN Dyspepsia  artificial tears (preservative free) Ophthalmic Solution 1 Drop(s) Both EYES every 6 hours PRN Dry Eyes  melatonin 3 milliGRAM(s) Oral at bedtime PRN Insomnia  ondansetron Injectable 4 milliGRAM(s) IV Push every 8 hours PRN Nausea and/or Vomiting      LABS:                        8.9    7.25  )-----------( 172      ( 19 Mar 2025 07:05 )             28.4     03-19    139  |  105  |  24[H]  ----------------------------<  98  3.8   |  25  |  1.51[H]    Ca    9.5      19 Mar 2025 07:07  Phos  2.4     03-19  Mg     2.0     03-19        Urinalysis Basic - ( 19 Mar 2025 07:07 )    Color: x / Appearance: x / SG: x / pH: x  Gluc: 98 mg/dL / Ketone: x  / Bili: x / Urobili: x   Blood: x / Protein: x / Nitrite: x   Leuk Esterase: x / RBC: x / WBC x   Sq Epi: x / Non Sq Epi: x / Bacteria: x      Arterial Blood Gas:  03-18 @ 05:47  7.36/46/118/26/100.0/0.3  ABG lactate: --  Arterial Blood Gas:  03-17 @ 11:00  7.37/43/140/25/99.0/-0.6  ABG lactate: --      CAPILLARY BLOOD GLUCOSE    MICROBIOLOGY:     RADIOLOGY:  [ ] Reviewed and interpreted by me

## 2025-03-19 NOTE — PROGRESS NOTE ADULT - NS ATTEND AMEND GEN_ALL_CORE FT
68 yo M with a h/o metastatic testicular cancer, epilepsy on AEDs, chronic respiratory failure with hypoxia and hypercapnia on home O2, VAZQUEZ on CPAP, HFpEF admitted for acute on chronic respiratory failure with hypoxia and hypercapnia likely secondary to combination of COVID PNA, bacterial superinfection, and acute pulmonary edema due to acute on chronic HFpEF. Hypotension likely severe sepsis with septic shock. SHAGUFTA likely ATN +/- venous congestion. Repeat CT chest with bilateral GGOs possibly acute pulmonary edema vs early fibrosis vs residual COVID along with L basilar consolidation likely bacterial PNA vs atelectasis. Now s/p trach and PEG.    # Acute on chronic respiratory failure with hypoxia and hypercapnia  # COVID PNA  # Bacterial PNA  # Acute pulmonary edema  # Acute on chronic HFpEF  # Hypotension  # Severe sepsis with septic shock  # SHAGUFTA  # Hypernatremia  # Epilepsy  Clinically much improved, seen sitting in a chair, interactive and phonating  - Continue AEDs  - Tolerating TC and PMV   - Trend Cr, avoid nephrotoxins  - Completed Levaquin course   - Completed course of dexamethasone and remdesivir for COVID  - c/w PEG feeds, e/o aspiration on FEES testing on 3/18  - Hypernatremia resolved  - Therapeutic AC on hold for Afib due to prior acute anemia, but H/H now stable. On Eliquis. TTE without evidence of LA thrombus, unable to assess Left atrial appendage  -OOB to chair, PT  - Full code

## 2025-03-19 NOTE — PROGRESS NOTE ADULT - PROBLEM SELECTOR PLAN 1
- Primarily caused by Covid, superimposed by bacterial pneumonia  - Intubated 02/23, extubated 03/04  - Patient with worsening secretions and consolidations on 3/8, requiring re-intubation   - S/p tracheostomy on 3/12 by MICU  - BAL culture 3/8: Pseudomonas  - Txd with Levaquin renally dosed 750 q48h (3/9-3/16) - Primarily caused by Covid, superimposed by bacterial pneumonia  - Intubated 02/23, extubated 03/04  - Patient with worsening secretions and consolidations on 3/8, requiring re-intubation   - S/p tracheostomy on 3/12 by MICU  - Tolerating TC ATC since 3/16.  - Tolerating PMV Trials   - BAL culture 3/8: Pseudomonas  - Txd with Levaquin renally dosed 750 q48h (3/9-3/16)

## 2025-03-19 NOTE — PROGRESS NOTE ADULT - ASSESSMENT
69 year old male with a past medical history of hypertension, hyperlipemia VAZQUEZ (on CPAP at bedtime, NC 2 liters during the day), CKD stage 3, epilepsy, schizophrenia, developmental delay, metastatic testicular cancer (s/p orchiectomy, actively on chemotherapy, last dose of etoposide/cisplatin on 6/2024) presenting with worsening shortness of breath. Patient was recently hospitalized at [facility] from January 27th 2025 to February 6th 2025 for seizure and influenza virus infection, which required intubation. He was sent to rehab (originally from home) from hospital. He returns due to sudden onset shortness of breath and worsening oxygenation. Of note, he tested positive for COVID-19       1- SHAGUFTA on CKDIII  2- covid-19  3- anemia  4- hypotension  5- respiratory failure         SHAGUFTA suspected in setting of new infection. covid 19   creatinine is improving  bp is in range   anemia, trend hb   retacrit [id number] U tiw sq  strict I/O  trend creatinine and electroytes daily

## 2025-03-19 NOTE — PROGRESS NOTE ADULT - PROBLEM SELECTOR PLAN 8
- SHAGUFTA on CKD stage 3 suspected in setting of new infection Covid 19   - CR peaked to 4.34 on 3/3, now improving   - Renvela d/cd Hyperphosphatemia resolved   - Continue Retacrit [id number] U tiw sq

## 2025-03-19 NOTE — PROGRESS NOTE ADULT - SUBJECTIVE AND OBJECTIVE BOX
[facility]  JACINTO Horton Y. Patel, S. Shah, G. Casimir  [phone]  ([phone] - weekdays after 5pm and weekends)    Name: OSCAR MILAN  Age/Gender: 69y Male  MRN: [id number]    Interval History:  Patient seen and examined this morning.   Resting comfortably, denies pain.   Notes reviewed  No concerning overnight events  Afebrile   Allergies: seasonal allergies (Unknown)  No Known Drug Allergies      Objective:  Vitals:   T(F): 97.3 (03-19-25 @ 04:49), Max: 98.7 (03-18-25 @ 11:11)  HR: 91 (03-19-25 @ 05:06) (87 - 100)  BP: 174/96 (03-19-25 @ 04:49) (139/86 - 174/96)  RR: 18 (03-19-25 @ 04:49) (18 - 20)  SpO2: 99% (03-19-25 @ 05:06) (95% - 100%)  Physical Examination:  General: no acute distress, nontoxic   HEENT: NC/AT, anicteric, trach collar   Respiratory: breathing comfortably  Cardiovascular: S1 and S2 present  Gastrointestinal: nondistended  Extremities: no edema, no cyanosis  Skin: no visible rash    Laboratory Studies:  CBC:                       8.9    7.25  )-----------( 172      ( 19 Mar 2025 07:05 )             28.4     WBC Trend:  7.25 03-19-25 @ 07:05  7.13 03-18-25 @ 07:13  7.56 03-17-25 @ 06:35  7.61 03-16-25 @ 09:28  7.74 03-15-25 @ 09:38  11.78 03-14-25 @ 07:13  7.78 03-13-25 @ 00:16    CMP: 03-19    139  |  105  |  24[H]  ----------------------------<  98  3.8   |  25  |  1.51[H]    Ca    9.5      19 Mar 2025 07:07  Phos  2.4     03-19  Mg     2.0     03-19      Creatinine: 1.51 mg/dL (03-19-25 @ 07:07)  Creatinine: 1.67 mg/dL (03-18-25 @ 07:13)  Creatinine: 1.73 mg/dL (03-17-25 @ 06:35)  Creatinine: 2.01 mg/dL (03-16-25 @ 09:29)  Creatinine: 2.28 mg/dL (03-15-25 @ 09:38)  Creatinine: 2.67 mg/dL (03-14-25 @ 07:14)  Creatinine: 3.06 mg/dL (03-13-25 @ 00:16)    Microbiology: reviewed   Culture - Urine (collected 03-09-25 @ 18:14)  Source: Catheterized Catheterized  Final Report (03-11-25 @ 13:23):    >100,000 CFU/ml Pseudomonas aeruginosa  Organism: Pseudomonas aeruginosa (03-11-25 @ 13:23)  Organism: Pseudomonas aeruginosa (03-11-25 @ 13:23)      Method Type: MARIELENA      -  Amikacin: S <=16      -  Aztreonam: R >16      -  Cefepime: I 16      -  Ceftazidime: R >16      -  Ciprofloxacin: S <=0.25      -  Imipenem: S <=1      -  Levofloxacin: S <=0.5      -  Meropenem: S <=1      -  Piperacillin/Tazobactam: R >64    Culture - Bronchial (collected 03-08-25 @ 15:27)  Source: Bronchial Bronchial Lavage  Gram Stain (03-09-25 @ 00:20):    Moderate polymorphonuclear leukocytes per low power field    No Squamous epithelial cells per low power field    Moderate Gram Negative Rods per oil power field    Few Gram positive cocci in pairs per oil power field  Final Report (03-10-25 @ 15:13):    Moderate Pseudomonas aeruginosa    Commensal lucia consistent with body site  Organism: Pseudomonas aeruginosa (03-10-25 @ 15:13)  Organism: Pseudomonas aeruginosa (03-10-25 @ 15:13)      Method Type: MARIELENA      -  Aztreonam: S 8      -  Cefepime: S 8      -  Ceftazidime: S 8      -  Ciprofloxacin: S <=0.25      -  Imipenem: S <=1      -  Levofloxacin: S <=0.5      -  Meropenem: S <=1      -  Piperacillin/Tazobactam: R 64    Culture - Blood (collected 03-08-25 @ 00:00)  Source: Blood Blood  Final Report (03-13-25 @ 04:00):    No growth at 5 days    Culture - Blood (collected 03-07-25 @ 23:30)  Source: Blood Blood  Final Report (03-13-25 @ 04:00):    No growth at 5 days    Culture - Sputum (collected 03-07-25 @ 18:42)  Source: Sputum Sputum  Gram Stain (03-08-25 @ 07:01):    Rare polymorphonuclear leukocytes per low power field    Few Squamous epithelial cells per low power field    Moderate Gram Negative Rods seen per oil power field  Final Report (03-09-25 @ 16:20):    Numerous Pseudomonas aeruginosa    Commensal lucia consistent with body site  Organism: Pseudomonas aeruginosa (03-09-25 @ 16:20)  Organism: Pseudomonas aeruginosa (03-09-25 @ 16:20)      Method Type: MARIELENA      -  Aztreonam: S 8      -  Cefepime: S 8      -  Ceftazidime: S 8      -  Ciprofloxacin: S <=0.25      -  Imipenem: S <=1      -  Levofloxacin: S <=0.5      -  Meropenem: S <=1      -  Piperacillin/Tazobactam: R 64    Culture - Eye (collected 03-05-25 @ 16:15)  Source: Eye Conjunctiva-Right  Gram Stain (03-08-25 @ 00:03):    No polymorphonuclear cells seen    No organisms seen    by cytocentrifuge  Final Report (03-10-25 @ 19:22):    Rare Staphylococcus epidermidis  Organism: Staphylococcus epidermidis (03-10-25 @ 19:22)      Method Type: MARIELENA      -  Clindamycin: R 4      -  Erythromycin: S <=0.25      -  Gentamicin: S <=4 Should not be used as monotherapy      -  Oxacillin: R 2      -  Penicillin: R 1      -  Rifampin: S <=1 Should not be used as monotherapy      -  Tetracycline: S <=4      -  Trimethoprim/Sulfamethoxazole: S <=0.5/9.5      -  Vancomycin: S 1  Organism: Staphylococcus epidermidis (03-10-25 @ 19:22)    Radiology: reviewed     Medications:  acetaminophen     Tablet .. 650 milliGRAM(s) Oral every 6 hours PRN  albuterol/ipratropium for Nebulization 3 milliLiter(s) Nebulizer every 6 hours  aluminum hydroxide/magnesium hydroxide/simethicone Suspension 30 milliLiter(s) Oral every 4 hours PRN  apixaban 5 milliGRAM(s) Enteral Tube every 12 hours  artificial tears (preservative free) Ophthalmic Solution 1 Drop(s) Both EYES every 6 hours PRN  atorvastatin 20 milliGRAM(s) Oral at bedtime  chlorhexidine 2% Cloths 1 Application(s) Topical <User Schedule>  cholecalciferol 1000 Unit(s) Oral daily  epoetin krystyna-epbx (RETACRIT) Injectable 85033 Unit(s) SubCutaneous <User Schedule>  escitalopram 15 milliGRAM(s) Oral with breakfast  gabapentin Solution 150 milliGRAM(s) Oral every 12 hours  influenza  Vaccine (HIGH DOSE) 0.5 milliLiter(s) IntraMuscular once  lacosamide IVPB 200 milliGRAM(s) IV Intermittent every 12 hours  lamoTRIgine 50 milliGRAM(s) Oral two times a day  lanolin Ointment 1 Application(s) Topical daily  levETIRAcetam  IVPB 750 milliGRAM(s) IV Intermittent every 12 hours  lurasidone 20 milliGRAM(s) Oral at bedtime  melatonin 3 milliGRAM(s) Oral at bedtime PRN  metoprolol tartrate 25 milliGRAM(s) Oral every 12 hours  nystatin Powder 1 Application(s) Topical every 8 hours  ondansetron Injectable 4 milliGRAM(s) IV Push every 8 hours PRN  pantoprazole  Injectable 40 milliGRAM(s) IV Push daily  polyethylene glycol 3350 17 Gram(s) Oral daily    Prior/Completed Antimicrobials:  levoFLOXacin IVPB  levoFLOXacin IVPB  levoFLOXacin IVPB  piperacillin/tazobactam IVPB.  piperacillin/tazobactam IVPB.-  piperacillin/tazobactam IVPB.-  piperacillin/tazobactam IVPB..  piperacillin/tazobactam IVPB..  piperacillin/tazobactam IVPB...  remdesivir  IVPB  remdesivir  IVPB  remdesivir  IVPB  vancomycin  IVPB.

## 2025-03-19 NOTE — PROGRESS NOTE ADULT - PROBLEM SELECTOR PLAN 10
- Full Code  - Contact: Sister Ester [phone]  - Sister updated at bedside today as well as patients brother over Facetime - Full Code  - Contact: Sister Ester [phone]  -  Patients brother updated at bedside

## 2025-03-19 NOTE — PROGRESS NOTE ADULT - SUBJECTIVE AND OBJECTIVE BOX
DATE OF SERVICE: 03-19-25 @ 10:46    Patient is a 69y old  Male who presents with a chief complaint of Acute on chronic hypoxemic respiratory failure (19 Mar 2025 09:38)      INTERVAL HISTORY: Feels ok.     REVIEW OF SYSTEMS:  CONSTITUTIONAL: No weakness  EYES/ENT: No visual changes;  No throat pain   NECK: No pain or stiffness  RESPIRATORY: No cough, wheezing; No shortness of breath  CARDIOVASCULAR: No chest pain or palpitations  GASTROINTESTINAL: No abdominal  pain. No nausea, vomiting, or hematemesis  GENITOURINARY: No dysuria, frequency or hematuria  NEUROLOGICAL: No stroke like symptoms  SKIN: No rashes    	  MEDICATIONS:  metoprolol tartrate 25 milliGRAM(s) Oral every 12 hours        PHYSICAL EXAM:  T(C): 36.3 (03-19-25 @ 04:49), Max: 37.1 (03-18-25 @ 11:11)  HR: 83 (03-19-25 @ 10:40) (83 - 100)  BP: 164/85 (03-19-25 @ 10:40) (139/86 - 174/96)  RR: 18 (03-19-25 @ 10:40) (18 - 20)  SpO2: 100% (03-19-25 @ 10:40) (95% - 100%)  Wt(kg): --  I&O's Summary    18 Mar 2025 07:01  -  19 Mar 2025 07:00  --------------------------------------------------------  IN: 840 mL / OUT: 900 mL / NET: -60 mL          Appearance: In no distress	  HEENT:    PERRL, EOMI	  Cardiovascular:  S1 S2, No JVD  Respiratory: Lungs clear to auscultation	  Gastrointestinal:  Soft, Non-tender, + BS	  Vascularature:  No edema of LE  Psychiatric: Appropriate affect   Neuro: no acute focal deficits                               8.9    7.25  )-----------( 172      ( 19 Mar 2025 07:05 )             28.4     03-19    139  |  105  |  24[H]  ----------------------------<  98  3.8   |  25  |  1.51[H]    Ca    9.5      19 Mar 2025 07:07  Phos  2.4     03-19  Mg     2.0     03-19          Labs personally reviewed      ASSESSMENT/PLAN: 	    69 year old male with a past medical history of hypertension, hyperlipemia VAZQUEZ (on CPAP at bedtime, NC 2 liters during the day), CKD stage 3, epilepsy, schizophrenia, developmental delay, metastatic testicular cancer (s/p orchiectomy, actively on chemotherapy, last dose of etoposide/cisplatin on 6/2024), presenting with acute on chronic hypoxemic respiratory failure, secondary to (a) COVID-19 infection, (b) superimposed pneumonia after recent influenza infection, and/or (c) fluid overload.     Problem/Plan - 1:  ·  Problem: Acute on chronic respiratory failure with hypoxia.   ·  Plan: Likely 2/2 PNA, HF  - Appreciate pulmonology.  - s/p trach 3/12  - Transferred to [facility]    Problem/Plan - 2:  ·  Problem: SHAGUFTA (acute kidney injury).   ·  Plan: Has a history of CKD stage 3, Cr 2.38 at baseline.    - Nephrology following  - BUN now improved following de-escalation of diuretics    Problem/Plan - 3:  ·  Problem: Chronic heart failure with preserved ejection fraction.   ·  Plan: TTE done here 1/17/25 technically difficult, but LV systolic fxn appears nl without any regional wall motion abnormalities  - Euvolemic, repeat BNP. On 2/23 2300  - TTE 3/17 1. No obvious Left atrial thrombus; unable to evaluate left atrial appendage on TTE 2. Compared to the transthoracic echocardiogram performed on 2/23/2025, LA difficult to compare.    Problem/Plan - 4:  ·  Problem: New onset Afib RVR (on tele, confirmed with EKG 2/19)   ·  Plan:  - Cont Metoprolol 25mg BID  - Cont Eliquis 5mg BID      Problem/Plan - 5:  ·  Problem: HTN    ·  Plan: Resolved  - 3/19 slightly above target. Will cont to monitor.           Magaly Laird, AG-NP   Gus Andrew,  [facility]  Cardiovascular Medicine  [address], [address]  Available through call or text on [employer] TEAMs  Office: [phone]

## 2025-03-19 NOTE — PROGRESS NOTE ADULT - ASSESSMENT
Patient is a 69 year old male with PMH of HTN, HLD, VAZQUEZ (on CPAP at bedtime, NC 2 liters during the day), CKD3, epilepsy, schizophrenia, developmental delay, metastatic testicular cancer (s/p orchiectomy, actively on chemotherapy, last dose of etoposide/cisplatin on 6/2024) presenting with worsening shortness of breath. Patient was recently hospitalized at [facility] from January 27th 2025 to February 6th 2025 for seizure and influenza virus infection, which required intubation. He was sent to rehab (originally from home) from hospital and now returns due to sudden onset shortness of breath and worsening oxygenation. Of note, he tested positive for COVID-19 at facility on 2/13/25 and was not put on any COVID-19 treatment at the time, only guaifenesin and scopolamine patch. Patient was placed on non-rebreather and sent to the hospital on 2/16/25. Noted initial discussion with patient/family and they decided to hold off on remdesivir given LFTs and SHAGUFTA.     Acute on chronic hypoxic respiratory failure  COVID-19 pneumonia, superimposed bacterial pneumonia  Acute on chronic HFpEF  Klebsiella UTI, treated   SHAGUFTA on CKD  Severe sepsis, hypotension, SHAGUFTA, likely due to aspiration pneumonia, treated  Now with fever with copious secretions post extubation - Pseudomonas pneumonia   Pseudomonas UTI  - CT chest with extensive b/l ggo c/w COVID pneumonia; tracheomalacia   - MRSA PCR screen negative, s/p vancomycin   - 2/18 worsening resp status, requiring AVAPS, started on remdesivir   - 2/22 completed remdesivir x5d course   - 2/23 s/p RRT for inc WOB, s/p intubation, suspected aspiration   - 2/24 intubated, on sedation, pressor support, SHAGUFTA, WBC wnl   - 2/25 afebrile, off pressor, WBC wnl, Cr stable  - 2/26 CT chest with improved upper lobe ggo, new/worsening confluent areas of consolidation in mid-lower lungs   - 2/26 completed dexamethasone x10d  - 2/28 completed zosyn   - 3/4 s/p extubation, now on NC, copious secretions requiring frequent suctioning   - 3/5 started on erythromycin for conjunctivitis, culture grew Staph epi - S to erythromycin   - 3/7 febrile, WBC noted, secretions less but still a lot, worsening pulm findings, likely aspirating, started on zosyn    - s/p re-intubation and bronchoscopy with L mainstem mucous plug, thick secretions -send for culture    - 3/7 Scx noted with Pseudomonas aeruginosa -- now R to zosyn (h/o pansensitive Pseudomonas in Scx in 1/2025)   - 3/8 BAL culture with mod Pseudomonas   -  3/9 pm switched from zosyn to levofloxacin based on sensitivities   - 3/9 noted with cloudy urine, UA with pyuria -- Ucx growing Pseudomonas also (sensitivities noted)   - 3/10 WBC normalized, Hb low, temps wnl, remains intubated   - 3/12 s/p tracheostomy, now with speaking valve    - 3/14 WBC noted likely reactive--normalized; s/p PEG placement   - 3/15 completed levofloxacin x7d course     Recommendations:  Continue off antibiotics   Monitor temps/WBC, Cr  Supportive care  Aspiration precautions  Continue rest of care per primary team       Greyson Mart M.D.  [facility]  Available on [employer] TEAMS - *PREFERRED*  [phone]  After 5pm on weekdays and all day on weekends - please call [phone]     Thank you for consulting us and involving us in the management of this patients case. In addition to reviewing history, imaging, documents, labs, microbiology, took into account antibiotic stewardship, local antibiogram and infection control strategies and potential transmission issues.

## 2025-03-20 LAB
ANION GAP SERPL CALC-SCNC: 11 MMOL/L — SIGNIFICANT CHANGE UP (ref 5–17)
BUN SERPL-MCNC: 24 MG/DL — HIGH (ref 7–23)
CALCIUM SERPL-MCNC: 9.4 MG/DL — SIGNIFICANT CHANGE UP (ref 8.4–10.5)
CHLORIDE SERPL-SCNC: 104 MMOL/L — SIGNIFICANT CHANGE UP (ref 96–108)
CO2 SERPL-SCNC: 25 MMOL/L — SIGNIFICANT CHANGE UP (ref 22–31)
CREAT SERPL-MCNC: 1.43 MG/DL — HIGH (ref 0.5–1.3)
EGFR: 54 ML/MIN/1.73M2 — LOW
EGFR: 54 ML/MIN/1.73M2 — LOW
GLUCOSE SERPL-MCNC: 116 MG/DL — HIGH (ref 70–99)
HCT VFR BLD CALC: 27.9 % — LOW (ref 39–50)
HGB BLD-MCNC: 8.7 G/DL — LOW (ref 13–17)
MAGNESIUM SERPL-MCNC: 1.9 MG/DL — SIGNIFICANT CHANGE UP (ref 1.6–2.6)
MCHC RBC-ENTMCNC: 31.2 G/DL — LOW (ref 32–36)
MCHC RBC-ENTMCNC: 31.2 PG — SIGNIFICANT CHANGE UP (ref 27–34)
MCV RBC AUTO: 100 FL — SIGNIFICANT CHANGE UP (ref 80–100)
NRBC BLD AUTO-RTO: 0 /100 WBCS — SIGNIFICANT CHANGE UP (ref 0–0)
PHOSPHATE SERPL-MCNC: 2.4 MG/DL — LOW (ref 2.5–4.5)
PLATELET # BLD AUTO: 167 K/UL — SIGNIFICANT CHANGE UP (ref 150–400)
POTASSIUM SERPL-MCNC: 3.7 MMOL/L — SIGNIFICANT CHANGE UP (ref 3.5–5.3)
POTASSIUM SERPL-SCNC: 3.7 MMOL/L — SIGNIFICANT CHANGE UP (ref 3.5–5.3)
RBC # BLD: 2.79 M/UL — LOW (ref 4.2–5.8)
RBC # FLD: 17.2 % — HIGH (ref 10.3–14.5)
SODIUM SERPL-SCNC: 140 MMOL/L — SIGNIFICANT CHANGE UP (ref 135–145)
WBC # BLD: 7.22 K/UL — SIGNIFICANT CHANGE UP (ref 3.8–10.5)
WBC # FLD AUTO: 7.22 K/UL — SIGNIFICANT CHANGE UP (ref 3.8–10.5)

## 2025-03-20 RX ORDER — SOD PHOS DI, MONO/K PHOS MONO 250 MG
1 TABLET ORAL EVERY 8 HOURS
Refills: 0 | Status: COMPLETED | OUTPATIENT
Start: 2025-03-20 | End: 2025-03-21

## 2025-03-20 RX ORDER — LACOSAMIDE 150 MG/1
200 TABLET, FILM COATED ORAL
Refills: 0 | Status: DISCONTINUED | OUTPATIENT
Start: 2025-03-20 | End: 2025-03-28

## 2025-03-20 RX ORDER — LACOSAMIDE 150 MG/1
200 TABLET, FILM COATED ORAL EVERY 12 HOURS
Refills: 0 | Status: DISCONTINUED | OUTPATIENT
Start: 2025-03-20 | End: 2025-03-20

## 2025-03-20 RX ADMIN — Medication 1 TABLET(S): at 14:45

## 2025-03-20 RX ADMIN — APIXABAN 5 MILLIGRAM(S): 2.5 TABLET, FILM COATED ORAL at 05:14

## 2025-03-20 RX ADMIN — ATORVASTATIN CALCIUM 20 MILLIGRAM(S): 80 TABLET, FILM COATED ORAL at 21:12

## 2025-03-20 RX ADMIN — LEVETIRACETAM 400 MILLIGRAM(S): 10 INJECTION, SOLUTION INTRAVENOUS at 05:13

## 2025-03-20 RX ADMIN — APIXABAN 5 MILLIGRAM(S): 2.5 TABLET, FILM COATED ORAL at 17:11

## 2025-03-20 RX ADMIN — LEVETIRACETAM 400 MILLIGRAM(S): 10 INJECTION, SOLUTION INTRAVENOUS at 17:11

## 2025-03-20 RX ADMIN — NYSTATIN 1 APPLICATION(S): 100000 CREAM TOPICAL at 05:15

## 2025-03-20 RX ADMIN — IPRATROPIUM BROMIDE AND ALBUTEROL SULFATE 3 MILLILITER(S): .5; 2.5 SOLUTION RESPIRATORY (INHALATION) at 17:20

## 2025-03-20 RX ADMIN — GABAPENTIN 150 MILLIGRAM(S): 400 CAPSULE ORAL at 17:12

## 2025-03-20 RX ADMIN — LAMOTRIGINE 50 MILLIGRAM(S): 150 TABLET ORAL at 05:14

## 2025-03-20 RX ADMIN — Medication 1 TABLET(S): at 21:12

## 2025-03-20 RX ADMIN — IPRATROPIUM BROMIDE AND ALBUTEROL SULFATE 3 MILLILITER(S): .5; 2.5 SOLUTION RESPIRATORY (INHALATION) at 06:25

## 2025-03-20 RX ADMIN — NYSTATIN 1 APPLICATION(S): 100000 CREAM TOPICAL at 21:13

## 2025-03-20 RX ADMIN — Medication 40 MILLIGRAM(S): at 11:34

## 2025-03-20 RX ADMIN — LACOSAMIDE 200 MILLIGRAM(S): 150 TABLET, FILM COATED ORAL at 17:11

## 2025-03-20 RX ADMIN — EPOETIN ALFA 10000 UNIT(S): 10000 SOLUTION INTRAVENOUS; SUBCUTANEOUS at 14:45

## 2025-03-20 RX ADMIN — ESCITALOPRAM OXALATE 15 MILLIGRAM(S): 20 TABLET ORAL at 05:14

## 2025-03-20 RX ADMIN — METOPROLOL SUCCINATE 25 MILLIGRAM(S): 50 TABLET, EXTENDED RELEASE ORAL at 05:14

## 2025-03-20 RX ADMIN — LAMOTRIGINE 50 MILLIGRAM(S): 150 TABLET ORAL at 17:12

## 2025-03-20 RX ADMIN — LACOSAMIDE 140 MILLIGRAM(S): 150 TABLET, FILM COATED ORAL at 05:15

## 2025-03-20 RX ADMIN — METOPROLOL SUCCINATE 25 MILLIGRAM(S): 50 TABLET, EXTENDED RELEASE ORAL at 17:12

## 2025-03-20 RX ADMIN — Medication 1 APPLICATION(S): at 11:36

## 2025-03-20 RX ADMIN — NYSTATIN 1 APPLICATION(S): 100000 CREAM TOPICAL at 14:45

## 2025-03-20 RX ADMIN — IPRATROPIUM BROMIDE AND ALBUTEROL SULFATE 3 MILLILITER(S): .5; 2.5 SOLUTION RESPIRATORY (INHALATION) at 11:09

## 2025-03-20 RX ADMIN — IPRATROPIUM BROMIDE AND ALBUTEROL SULFATE 3 MILLILITER(S): .5; 2.5 SOLUTION RESPIRATORY (INHALATION) at 23:02

## 2025-03-20 RX ADMIN — GABAPENTIN 150 MILLIGRAM(S): 400 CAPSULE ORAL at 05:13

## 2025-03-20 RX ADMIN — LURASIDONE HYDROCHLORIDE 20 MILLIGRAM(S): 120 TABLET, FILM COATED ORAL at 21:13

## 2025-03-20 RX ADMIN — Medication 1000 UNIT(S): at 11:34

## 2025-03-20 NOTE — PROGRESS NOTE ADULT - PROBLEM SELECTOR PLAN 3
- Continue with home meds:  - Lacosamide ( Renew 3/19) and Lamotrigine  - Multiple EEG's negative no seizure, recently done 3/4 - Continue with home meds:  - Lacosamide ( Renew 3/19), Keppra, and Lamotrigine  - Multiple EEG's negative no seizure, recently done 3/4

## 2025-03-20 NOTE — PROGRESS NOTE ADULT - PROBLEM SELECTOR PLAN 8
- SHAGUFTA on CKD stage 3 suspected in setting of new infection Covid 19   - CR peaked to 4.34 on 3/3, now improving   - Renvela d/cd Hyperphosphatemia resolved   - Continue Retacrit [id number] U tiw sq

## 2025-03-20 NOTE — PROGRESS NOTE ADULT - ASSESSMENT
Patient is a 69 year old male with PMH of HTN, HLD, VAZQUEZ (on CPAP at bedtime, NC 2 liters during the day), CKD3, epilepsy, schizophrenia, developmental delay, metastatic testicular cancer (s/p orchiectomy, actively on chemotherapy, last dose of etoposide/cisplatin on 6/2024) presenting with worsening shortness of breath. Patient was recently hospitalized at [facility] from January 27th 2025 to February 6th 2025 for seizure and influenza virus infection, which required intubation. He was sent to rehab (originally from home) from hospital and now returns due to sudden onset shortness of breath and worsening oxygenation. Of note, he tested positive for COVID-19 at facility on 2/13/25 and was not put on any COVID-19 treatment at the time, only guaifenesin and scopolamine patch. Patient was placed on non-rebreather and sent to the hospital on 2/16/25. Noted initial discussion with patient/family and they decided to hold off on remdesivir given LFTs and SHAGUFTA.     Acute on chronic hypoxic respiratory failure  Acute on chronic HFpEF  COVID-19 pneumonia, superimposed bacterial pneumonia, treated   Klebsiella UTI, treated   SHAGUFTA on CKD, improved   Severe sepsis, hypotension, SHAGUFTA, likely due to aspiration pneumonia, treated  Course c/b fever with copious secretions post extubation - Pseudomonas pneumonia   Pseudomonas UTI, treated   - CT chest with extensive b/l ggo c/w COVID pneumonia; tracheomalacia   - MRSA PCR screen negative, s/p vancomycin   - 2/18 worsening resp status, requiring AVAPS, started on remdesivir   - 2/22 completed remdesivir x5d course   - 2/23 s/p RRT for inc WOB, s/p intubation, suspected aspiration   - 2/24 intubated, on sedation, pressor support, SHAGUFTA, WBC wnl   - 2/25 afebrile, off pressor, WBC wnl, Cr stable  - 2/26 CT chest with improved upper lobe ggo, new/worsening confluent areas of consolidation in mid-lower lungs   - 2/26 completed dexamethasone x10d  - 2/28 completed zosyn   - 3/4 s/p extubation, now on NC, copious secretions requiring frequent suctioning   - 3/5 started on erythromycin for conjunctivitis, culture grew Staph epi - S to erythromycin   - 3/7 febrile, WBC noted, secretions less but still a lot, worsening pulm findings, likely aspirating, started on zosyn    - s/p re-intubation and bronchoscopy with L mainstem mucous plug, thick secretions -send for culture    - 3/7 Scx noted with Pseudomonas aeruginosa -- now R to zosyn (h/o pansensitive Pseudomonas in Scx in 1/2025)   - 3/8 BAL culture with mod Pseudomonas   -  3/9 pm switched from zosyn to levofloxacin based on sensitivities   - 3/9 noted with cloudy urine, UA with pyuria -- Ucx growing Pseudomonas also (sensitivities noted)   - 3/10 WBC normalized, Hb low, temps wnl, remains intubated   - 3/12 s/p tracheostomy, now with speaking valve    - 3/14 WBC noted likely reactive--normalized; s/p PEG placement   - 3/15 completed levofloxacin x7d course     Recommendations:  Continue off antibiotics   Supportive care  Aspiration precautions  Continue rest of care per primary team     Stable from ID standpoint at this time.   Please call if any questions, thank you.     Greyson Mart M.D.  [facility]  Available on [employer] TEAMS - *PREFERRED*  [phone]  After 5pm on weekdays and all day on weekends - please call [phone]     Thank you for consulting us and involving us in the management of this patients case. In addition to reviewing history, imaging, documents, labs, microbiology, took into account antibiotic stewardship, local antibiogram and infection control strategies and potential transmission issues.

## 2025-03-20 NOTE — PROGRESS NOTE ADULT - SUBJECTIVE AND OBJECTIVE BOX
Patient is a 69y old  Male who presents with a chief complaint of Acute on chronic hypoxemic respiratory failure (20 Mar 2025 05:24)      Interval Events:    REVIEW OF SYSTEMS:  [ ] Positive  [ ] All other systems negative  [ ] Unable to assess ROS because ________    Vital Signs Last 24 Hrs  T(C): 36.8 (03-20-25 @ 05:01), Max: 36.8 (03-20-25 @ 05:01)  T(F): 98.2 (03-20-25 @ 05:01), Max: 98.2 (03-20-25 @ 05:01)  HR: 79 (03-20-25 @ 05:01) (68 - 93)  BP: 137/71 (03-20-25 @ 05:01) (135/83 - 169/83)  RR: 18 (03-20-25 @ 05:01) (18 - 20)  SpO2: 98% (03-20-25 @ 05:01) (95% - 100%)PHYSICAL EXAM:  HEENT:   [ ]Tracheostomy:  [ ]Pupils equal  [ ]No oral lesions  [ ]Abnormal        SKIN  [ ]No Rash  [ ] Abnormal  [ ] pressure    CARDIAC  [ ]Regular  [ ]Abnormal    PULMONARY  [ ]Bilateral Clear Breath Sounds  [ ]Normal Excursion  [ ]Abnormal    GI  [ ]PEG      [ ] +BS		              [ ]Soft, nondistended, nontender	  [ ]Abnormal    MUSCULOSKELETAL                                   [ ]Bedbound                 [ ]Abnormal    [ ]Ambulatory/OOB to chair                           EXTREMITIES                                         [ ]Normal  [ ]Edema                           NEUROLOGIC  [ ] Normal, non focal  [ ] Focal findings:    PSYCHIATRIC  [ ]Alert and appropriate  [ ] Sedated	 [ ]Agitated    :  [place]: [ ] Yes, if yes: Date of Placement:                   [  ] No    LINES: Central Lines [ ] Yes, if yes: Date of Placement                                     [  ] No    HOSPITAL MEDICATIONS:  MEDICATIONS  (STANDING):  albuterol/ipratropium for Nebulization 3 milliLiter(s) Nebulizer every 6 hours  apixaban 5 milliGRAM(s) Enteral Tube every 12 hours  atorvastatin 20 milliGRAM(s) Oral at bedtime  chlorhexidine 2% Cloths 1 Application(s) Topical <User Schedule>  cholecalciferol 1000 Unit(s) Oral daily  epoetin krystyna-epbx (RETACRIT) Injectable 77681 Unit(s) SubCutaneous <User Schedule>  escitalopram 15 milliGRAM(s) Oral with breakfast  gabapentin Solution 150 milliGRAM(s) Oral every 12 hours  influenza  Vaccine (HIGH DOSE) 0.5 milliLiter(s) IntraMuscular once  lacosamide IVPB 200 milliGRAM(s) IV Intermittent every 12 hours  lamoTRIgine 50 milliGRAM(s) Oral two times a day  lanolin Ointment 1 Application(s) Topical daily  levETIRAcetam  IVPB 750 milliGRAM(s) IV Intermittent every 12 hours  lurasidone 20 milliGRAM(s) Oral at bedtime  metoprolol tartrate 25 milliGRAM(s) Oral every 12 hours  nystatin Powder 1 Application(s) Topical every 8 hours  pantoprazole  Injectable 40 milliGRAM(s) IV Push daily  polyethylene glycol 3350 17 Gram(s) Oral daily    MEDICATIONS  (PRN):  acetaminophen     Tablet .. 650 milliGRAM(s) Oral every 6 hours PRN Temp greater or equal to 38C (100.4F), Mild Pain (1 - 3)  aluminum hydroxide/magnesium hydroxide/simethicone Suspension 30 milliLiter(s) Oral every 4 hours PRN Dyspepsia  artificial tears (preservative free) Ophthalmic Solution 1 Drop(s) Both EYES every 6 hours PRN Dry Eyes  melatonin 3 milliGRAM(s) Oral at bedtime PRN Insomnia  ondansetron Injectable 4 milliGRAM(s) IV Push every 8 hours PRN Nausea and/or Vomiting      LABS:                        8.9    7.25  )-----------( 172      ( 19 Mar 2025 07:05 )             28.4     03-19    139  |  105  |  24[H]  ----------------------------<  98  3.8   |  25  |  1.51[H]    Ca    9.5      19 Mar 2025 07:07  Phos  2.4     03-19  Mg     2.0     03-19        Urinalysis Basic - ( 19 Mar 2025 07:07 )    Color: x / Appearance: x / SG: x / pH: x  Gluc: 98 mg/dL / Ketone: x  / Bili: x / Urobili: x   Blood: x / Protein: x / Nitrite: x   Leuk Esterase: x / RBC: x / WBC x   Sq Epi: x / Non Sq Epi: x / Bacteria: x          CAPILLARY BLOOD GLUCOSE    MICROBIOLOGY:     RADIOLOGY:  [ ] Reviewed and interpreted by me     Patient is a 69y old  Male who presents with a chief complaint of Acute on chronic hypoxemic respiratory failure (20 Mar 2025 05:24)      Interval Events: Did well on continuous TC overnight w/ PMV at times     REVIEW OF SYSTEMS:  [ ] Positive  [x ] All other systems negative; Communicates w/ PMV   [ ] Unable to assess ROS because ________    Vital Signs Last 24 Hrs  T(C): 36.8 (03-20-25 @ 05:01), Max: 36.8 (03-20-25 @ 05:01)  T(F): 98.2 (03-20-25 @ 05:01), Max: 98.2 (03-20-25 @ 05:01)  HR: 79 (03-20-25 @ 05:01) (68 - 93)  BP: 137/71 (03-20-25 @ 05:01) (135/83 - 169/83)  RR: 18 (03-20-25 @ 05:01) (18 - 20)  SpO2: 98% (03-20-25 @ 05:01) (95% - 100%)    PHYSICAL EXAM:    HEENT:   [X]Tracheostomy: #7 cuffed portex   [X]Pupils equal  [ ]No oral lesions  [ ]Abnormal    SKIN  [ ]No Rash  [X] Abnormal: macerated skin noted on perineum, buttocks, and inner thighs; Lt occipital scalp injury   [ ] pressure    CARDIAC  [X]Regular  [ ]Abnormal    PULMONARY  [X]Bilateral Clear Breath Sounds  [ ]Normal Excursion  [ ]Abnormal    GI  [X]PEG site c/d/i      [X] +BS		              [X]Soft, nondistended, nontender	  [ ]Abnormal    MUSCULOSKELETAL                                   [X]Bedbound                 [ ]Abnormal    [ ]Ambulatory/OOB to chair                           EXTREMITIES                                         [X]Normal  [ ]Edema                           NEUROLOGIC  [X] Normal, non focal  [ ] Focal findings:    PSYCHIATRIC  [X]Alert and appropriate Mood   [ ] Sedated	 [ ]Agitated    :  [place]: [ ] Yes, if yes: Date of Placement:                   [X] No    LINES: Central Lines [ ] Yes, if yes: Date of Placement                                     [X] No      HOSPITAL MEDICATIONS:  MEDICATIONS  (STANDING):  albuterol/ipratropium for Nebulization 3 milliLiter(s) Nebulizer every 6 hours  apixaban 5 milliGRAM(s) Enteral Tube every 12 hours  atorvastatin 20 milliGRAM(s) Oral at bedtime  chlorhexidine 2% Cloths 1 Application(s) Topical <User Schedule>  cholecalciferol 1000 Unit(s) Oral daily  epoetin krystyna-epbx (RETACRIT) Injectable 98676 Unit(s) SubCutaneous <User Schedule>  escitalopram 15 milliGRAM(s) Oral with breakfast  gabapentin Solution 150 milliGRAM(s) Oral every 12 hours  influenza  Vaccine (HIGH DOSE) 0.5 milliLiter(s) IntraMuscular once  lacosamide IVPB 200 milliGRAM(s) IV Intermittent every 12 hours  lamoTRIgine 50 milliGRAM(s) Oral two times a day  lanolin Ointment 1 Application(s) Topical daily  levETIRAcetam  IVPB 750 milliGRAM(s) IV Intermittent every 12 hours  lurasidone 20 milliGRAM(s) Oral at bedtime  metoprolol tartrate 25 milliGRAM(s) Oral every 12 hours  nystatin Powder 1 Application(s) Topical every 8 hours  pantoprazole  Injectable 40 milliGRAM(s) IV Push daily  polyethylene glycol 3350 17 Gram(s) Oral daily    MEDICATIONS  (PRN):  acetaminophen     Tablet .. 650 milliGRAM(s) Oral every 6 hours PRN Temp greater or equal to 38C (100.4F), Mild Pain (1 - 3)  aluminum hydroxide/magnesium hydroxide/simethicone Suspension 30 milliLiter(s) Oral every 4 hours PRN Dyspepsia  artificial tears (preservative free) Ophthalmic Solution 1 Drop(s) Both EYES every 6 hours PRN Dry Eyes  melatonin 3 milliGRAM(s) Oral at bedtime PRN Insomnia  ondansetron Injectable 4 milliGRAM(s) IV Push every 8 hours PRN Nausea and/or Vomiting      LABS:                        8.9    7.25  )-----------( 172      ( 19 Mar 2025 07:05 )             28.4     03-19    139  |  105  |  24[H]  ----------------------------<  98  3.8   |  25  |  1.51[H]    Ca    9.5      19 Mar 2025 07:07  Phos  2.4     03-19  Mg     2.0     03-19        Urinalysis Basic - ( 19 Mar 2025 07:07 )    Color: x / Appearance: x / SG: x / pH: x  Gluc: 98 mg/dL / Ketone: x  / Bili: x / Urobili: x   Blood: x / Protein: x / Nitrite: x   Leuk Esterase: x / RBC: x / WBC x   Sq Epi: x / Non Sq Epi: x / Bacteria: x          CAPILLARY BLOOD GLUCOSE    MICROBIOLOGY:     RADIOLOGY:  [ ] Reviewed and interpreted by me

## 2025-03-20 NOTE — PROGRESS NOTE ADULT - SUBJECTIVE AND OBJECTIVE BOX
[facility]   [phone]  RENAL FOLLOW UP NOTE  --------------------------------------------------------------------------------  Chief Complaint:    24 hour events/subjective:    patient seen and examined  appears comfortable    PAST HISTORY  --------------------------------------------------------------------------------  No significant changes to PMH, PSH, FHx, SHx, unless otherwise noted    ALLERGIES & MEDICATIONS  --------------------------------------------------------------------------------  Allergies    seasonal allergies (Unknown)  No Known Drug Allergies    Intolerances    latex (Rash)    Standing Inpatient Medications  albuterol/ipratropium for Nebulization 3 milliLiter(s) Nebulizer every 6 hours  apixaban 5 milliGRAM(s) Enteral Tube every 12 hours  atorvastatin 20 milliGRAM(s) Oral at bedtime  chlorhexidine 2% Cloths 1 Application(s) Topical <User Schedule>  cholecalciferol 1000 Unit(s) Oral daily  epoetin krystyna-epbx (RETACRIT) Injectable 57894 Unit(s) SubCutaneous <User Schedule>  escitalopram 15 milliGRAM(s) Oral with breakfast  gabapentin Solution 150 milliGRAM(s) Oral every 12 hours  influenza  Vaccine (HIGH DOSE) 0.5 milliLiter(s) IntraMuscular once  lacosamide IVPB 200 milliGRAM(s) IV Intermittent every 12 hours  lamoTRIgine 50 milliGRAM(s) Oral two times a day  lanolin Ointment 1 Application(s) Topical daily  levETIRAcetam  IVPB 750 milliGRAM(s) IV Intermittent every 12 hours  lurasidone 20 milliGRAM(s) Oral at bedtime  metoprolol tartrate 25 milliGRAM(s) Oral every 12 hours  nystatin Powder 1 Application(s) Topical every 8 hours  pantoprazole  Injectable 40 milliGRAM(s) IV Push daily  polyethylene glycol 3350 17 Gram(s) Oral daily    PRN Inpatient Medications  acetaminophen     Tablet .. 650 milliGRAM(s) Oral every 6 hours PRN  aluminum hydroxide/magnesium hydroxide/simethicone Suspension 30 milliLiter(s) Oral every 4 hours PRN  artificial tears (preservative free) Ophthalmic Solution 1 Drop(s) Both EYES every 6 hours PRN  melatonin 3 milliGRAM(s) Oral at bedtime PRN  ondansetron Injectable 4 milliGRAM(s) IV Push every 8 hours PRN      REVIEW OF SYSTEMS  --------------------------------------------------------------------------------        VITALS/PHYSICAL EXAM  --------------------------------------------------------------------------------  T(C): 36.8 (03-20-25 @ 05:01), Max: 36.8 (03-20-25 @ 05:01)  HR: 71 (03-20-25 @ 09:14) (68 - 88)  BP: 137/71 (03-20-25 @ 05:01) (135/83 - 169/83)  RR: 18 (03-20-25 @ 09:14) (18 - 20)  SpO2: 100% (03-20-25 @ 09:14) (95% - 100%)  Wt(kg): --        03-19-25 @ 07:01  -  03-20-25 @ 07:00  --------------------------------------------------------  IN: 1770 mL / OUT: 800 mL / NET: 970 mL      Physical Exam:  	              Gen: ill appearing male, trach collar  	Pulm: decrease bs, +coarse bs    	CV: No JVD. RRR, S1S2; no rub  	Abd: +BS, soft, nondistended, Peg  	UE: Warm, no cyanosis  no clubbing,  no edema;  	LE: Warm, no cyanosis  no clubbing, no edema    LABS/STUDIES  --------------------------------------------------------------------------------              8.7    7.22  >-----------<  167      [03-20-25 @ 07:16]              27.9     140  |  104  |  24  ----------------------------<  116      [03-20-25 @ 07:16]  3.7   |  25  |  1.43        Ca     9.4     [03-20-25 @ 07:16]      Mg     1.9     [03-20-25 @ 07:16]      Phos  2.4     [03-20-25 @ 07:16]            Creatinine Trend:  SCr 1.43 [03-20 @ 07:16]  SCr 1.51 [03-19 @ 07:07]  SCr 1.67 [03-18 @ 07:13]  SCr 1.73 [03-17 @ 06:35]  SCr 2.01 [03-16 @ 09:29]            Ferritin [id number]      [02-23-25 @ 06:15]  TSH 0.87      [01-25-25 @ 11:31]

## 2025-03-20 NOTE — PROGRESS NOTE ADULT - PROBLEM SELECTOR PLAN 1
- Primarily caused by Covid, superimposed by bacterial pneumonia  - Intubated 02/23, extubated 03/04  - Patient with worsening secretions and consolidations on 3/8, requiring re-intubation   - S/p tracheostomy on 3/12 by MICU  - Tolerating TC ATC since 3/16.  - Tolerating PMV Trials   - BAL culture 3/8: Pseudomonas  - Txd with Levaquin renally dosed 750 q48h (3/9-3/16) - Primarily caused by Covid, superimposed by bacterial pneumonia  - Intubated 02/23, extubated 03/04  - Patient with worsening secretions and consolidations on 3/8, requiring re-intubation   - S/p tracheostomy on 3/12 by MICU  - Tolerating TC ATC since 3/16.  - Tolerating PMV Trials   - Trach downsize to a 7 Cuffless Portex 3/20; Fio2 @ 28%   - BAL culture 3/8: Pseudomonas  - Txd with Levaquin renally dosed 750 q48h (3/9-3/16)

## 2025-03-20 NOTE — PROGRESS NOTE ADULT - PROBLEM SELECTOR PLAN 10
- Full Code  - Contact: Sister Ester [phone]  -  Patients brother updated at bedside - Full Code  - Contact: Sister Ester [phone]  -  Patients sister updated at bedside

## 2025-03-20 NOTE — PROGRESS NOTE ADULT - ASSESSMENT
69 year old male with a past medical history of hypertension, hyperlipemia VAZQUEZ (on CPAP at bedtime, NC 2 liters during the day), CKD stage 3, epilepsy, schizophrenia, developmental delay, metastatic testicular cancer (s/p orchiectomy, actively on chemotherapy, last dose of etoposide/cisplatin on 6/2024), presenting with acute on chronic hypoxemic respiratory failure, secondary to (a) COVID-19 infection, (b) superimposed pneumonia after recent influenza infection,  Transferred to MICU on 2/23 after RRT on floors due to hypoxia even with max setting AVAPS. Patient started on midodrine and weaned off of levo on 2/24. Failed pressure support on 2/25 and started diuresis with Bumex. CTH was unremarkable and weaned off of Precedex on 3/1. On 3/4 patient extubated and GOC conversations with family revealed that they would want patient to be trached. On 3/7, patient found to have RLL consolidation on POCUS and started on Zosyn, intubated on 3/8 due to increased lethargy and inability to clear oral secretions. Trach placed on 3/12. Transferred to RCU on 3/13. S/p Peg 3/14.  EGD with normal findings. Tolerating TC ATC since 3/16.   TTE performed 3/18 no obvious sign of left atrial appendage. Recommended by Cards and Cleared by heme for Eliquis 5mg BID. H/H stable. Tolerating PMV trials. Failed FEES 3/18.      3/19: Failed FEES 3/18, Continuing Therapeutic swallow exercise w/ speech, and aggressive PT in hope for possible Acute Rehab bed. PM& R will continue to follow.  69 year old male with a past medical history of hypertension, hyperlipemia VAZQUEZ (on CPAP at bedtime, NC 2 liters during the day), CKD stage 3, epilepsy, schizophrenia, developmental delay, metastatic testicular cancer (s/p orchiectomy, actively on chemotherapy, last dose of etoposide/cisplatin on 6/2024), presenting with acute on chronic hypoxemic respiratory failure, secondary to (a) COVID-19 infection, (b) superimposed pneumonia after recent influenza infection,  Transferred to MICU on 2/23 after RRT on floors due to hypoxia even with max setting AVAPS. Patient started on midodrine and weaned off of levo on 2/24. Failed pressure support on 2/25 and started diuresis with Bumex. CTH was unremarkable and weaned off of Precedex on 3/1. On 3/4 patient extubated and GOC conversations with family revealed that they would want patient to be trached. On 3/7, patient found to have RLL consolidation on POCUS and started on Zosyn, intubated on 3/8 due to increased lethargy and inability to clear oral secretions. Trach placed on 3/12. Transferred to RCU on 3/13. S/p Peg 3/14.  EGD with normal findings. Tolerating TC ATC since 3/16.   TTE performed 3/18 no obvious sign of left atrial appendage. Recommended by Cards and Cleared by heme for Eliquis 5mg BID. H/H stable. Tolerating PMV trials. Failed FEES 3/18.      3/20: Stable overnight, will downsize to cuffless trach today. Continuing Therapeutic swallow exercise w/ speech, and aggressive PT in hope for possible Acute Rehab bed. PM& R will continue to follow.  69 year old male with a past medical history of hypertension, hyperlipemia VAZQUEZ (on CPAP at bedtime, NC 2 liters during the day), CKD stage 3, epilepsy, schizophrenia, developmental delay, metastatic testicular cancer (s/p orchiectomy, actively on chemotherapy, last dose of etoposide/cisplatin on 6/2024), presenting with acute on chronic hypoxemic respiratory failure, secondary to (a) COVID-19 infection, (b) superimposed pneumonia after recent influenza infection,  Transferred to MICU on 2/23 after RRT on floors due to hypoxia even with max setting AVAPS. Patient started on midodrine and weaned off of levo on 2/24. Failed pressure support on 2/25 and started diuresis with Bumex. CTH was unremarkable and weaned off of Precedex on 3/1. On 3/4 patient extubated and GOC conversations with family revealed that they would want patient to be trached. On 3/7, patient found to have RLL consolidation on POCUS and started on Zosyn, intubated on 3/8 due to increased lethargy and inability to clear oral secretions. Trach placed on 3/12. Transferred to RCU on 3/13. S/p Peg 3/14.  EGD with normal findings. Tolerating TC ATC since 3/16.   TTE performed 3/18 no obvious sign of left atrial appendage. Recommended by Cards and Cleared by heme for Eliquis 5mg BID. H/H stable. Tolerating PMV trials. Failed FEES 3/18. Trach downsized to a 7 Cuffless Portex on 3/20; Fio2 at 28%.     3/20: Stable overnight, Trach downsize to 7 cuffless trach while in a supine position with neck extended. 7 Cuffed Trach was removed and replaced with a 7 Cuffless trach without complication, hypoxemia, or bleeding. Clear secretions suctioned from stoma. O2 sat'S remained @ 96%. Continuing Therapeutic swallow exercise w/ speech, and aggressive PT in hope for possible Acute Rehab bed. PM& R will continue to follow.

## 2025-03-20 NOTE — PROGRESS NOTE ADULT - SUBJECTIVE AND OBJECTIVE BOX
[facility] INPATIENT PROGRESS NOTE     Interval Hx:   03-20-25: Mr. Gr was seen at bedside today.    Meds:   MEDICATIONS  (STANDING):  albuterol/ipratropium for Nebulization 3 milliLiter(s) Nebulizer every 6 hours  apixaban 5 milliGRAM(s) Enteral Tube every 12 hours  atorvastatin 20 milliGRAM(s) Oral at bedtime  chlorhexidine 2% Cloths 1 Application(s) Topical <User Schedule>  cholecalciferol 1000 Unit(s) Oral daily  epoetin krystyna-epbx (RETACRIT) Injectable 97458 Unit(s) SubCutaneous <User Schedule>  escitalopram 15 milliGRAM(s) Oral with breakfast  gabapentin Solution 150 milliGRAM(s) Oral every 12 hours  influenza  Vaccine (HIGH DOSE) 0.5 milliLiter(s) IntraMuscular once  lacosamide IVPB 200 milliGRAM(s) IV Intermittent every 12 hours  lamoTRIgine 50 milliGRAM(s) Oral two times a day  lanolin Ointment 1 Application(s) Topical daily  levETIRAcetam  IVPB 750 milliGRAM(s) IV Intermittent every 12 hours  lurasidone 20 milliGRAM(s) Oral at bedtime  metoprolol tartrate 25 milliGRAM(s) Oral every 12 hours  nystatin Powder 1 Application(s) Topical every 8 hours  pantoprazole  Injectable 40 milliGRAM(s) IV Push daily  polyethylene glycol 3350 17 Gram(s) Oral daily    MEDICATIONS  (PRN):  acetaminophen     Tablet .. 650 milliGRAM(s) Oral every 6 hours PRN Temp greater or equal to 38C (100.4F), Mild Pain (1 - 3)  aluminum hydroxide/magnesium hydroxide/simethicone Suspension 30 milliLiter(s) Oral every 4 hours PRN Dyspepsia  artificial tears (preservative free) Ophthalmic Solution 1 Drop(s) Both EYES every 6 hours PRN Dry Eyes  melatonin 3 milliGRAM(s) Oral at bedtime PRN Insomnia  ondansetron Injectable 4 milliGRAM(s) IV Push every 8 hours PRN Nausea and/or Vomiting    Vital Signs Last 24 Hrs  T(C): 36.8 (20 Mar 2025 05:01), Max: 36.8 (20 Mar 2025 05:01)  T(F): 98.2 (20 Mar 2025 05:01), Max: 98.2 (20 Mar 2025 05:01)  HR: 79 (20 Mar 2025 05:01) (68 - 93)  BP: 137/71 (20 Mar 2025 05:01) (135/83 - 169/83)  BP(mean): 111 (19 Mar 2025 10:40) (111 - 111)  RR: 18 (20 Mar 2025 05:01) (18 - 20)  SpO2: 98% (20 Mar 2025 05:01) (95% - 100%)    Parameters below as of 20 Mar 2025 05:01  Patient On (Oxygen Delivery Method): tracheostomy collar    Physical Exam:  Gen: NAD, tracheostomy with ETT  HEENT: EOMI, MMM  Chest: equal chest rise  Cardiac: regular   Abd: non distended    Labs:                        8.9    7.25  )-----------( 172      ( 19 Mar 2025 07:05 )             28.4     CBC Full  -  ( 19 Mar 2025 07:05 )  WBC Count : 7.25 K/uL  RBC Count : 2.85 M/uL  Hemoglobin : 8.9 g/dL  Hematocrit : 28.4 %  Platelet Count - Automated : 172 K/uL  Mean Cell Volume : 99.6 fl  Mean Cell Hemoglobin : 31.2 pg  Mean Cell Hemoglobin Concentration : 31.3 g/dL    03-19    139  |  105  |  24[H]  ----------------------------<  98  3.8   |  25  |  1.51[H]    Ca    9.5      19 Mar 2025 07:07  Phos  2.4     03-19  Mg     2.0     03-19         [facility] INPATIENT PROGRESS NOTE     Interval Hx:   03-20-25: Mr. Gr was seen at bedside today, awake, without significant events noted, continues on Eliquis, therapeutic, without signs of bleeding, continues on Epo, counts and imaging reviewed, no overnight events noted, continues with tracheostomy without increased oxygen requirements, increased secretions noted     Meds:   MEDICATIONS  (STANDING):  albuterol/ipratropium for Nebulization 3 milliLiter(s) Nebulizer every 6 hours  apixaban 5 milliGRAM(s) Enteral Tube every 12 hours  atorvastatin 20 milliGRAM(s) Oral at bedtime  chlorhexidine 2% Cloths 1 Application(s) Topical <User Schedule>  cholecalciferol 1000 Unit(s) Oral daily  epoetin krystyna-epbx (RETACRIT) Injectable 44708 Unit(s) SubCutaneous <User Schedule>  escitalopram 15 milliGRAM(s) Oral with breakfast  gabapentin Solution 150 milliGRAM(s) Oral every 12 hours  influenza  Vaccine (HIGH DOSE) 0.5 milliLiter(s) IntraMuscular once  lacosamide IVPB 200 milliGRAM(s) IV Intermittent every 12 hours  lamoTRIgine 50 milliGRAM(s) Oral two times a day  lanolin Ointment 1 Application(s) Topical daily  levETIRAcetam  IVPB 750 milliGRAM(s) IV Intermittent every 12 hours  lurasidone 20 milliGRAM(s) Oral at bedtime  metoprolol tartrate 25 milliGRAM(s) Oral every 12 hours  nystatin Powder 1 Application(s) Topical every 8 hours  pantoprazole  Injectable 40 milliGRAM(s) IV Push daily  polyethylene glycol 3350 17 Gram(s) Oral daily    MEDICATIONS  (PRN):  acetaminophen     Tablet .. 650 milliGRAM(s) Oral every 6 hours PRN Temp greater or equal to 38C (100.4F), Mild Pain (1 - 3)  aluminum hydroxide/magnesium hydroxide/simethicone Suspension 30 milliLiter(s) Oral every 4 hours PRN Dyspepsia  artificial tears (preservative free) Ophthalmic Solution 1 Drop(s) Both EYES every 6 hours PRN Dry Eyes  melatonin 3 milliGRAM(s) Oral at bedtime PRN Insomnia  ondansetron Injectable 4 milliGRAM(s) IV Push every 8 hours PRN Nausea and/or Vomiting    Vital Signs Last 24 Hrs  T(C): 36.8 (20 Mar 2025 05:01), Max: 36.8 (20 Mar 2025 05:01)  T(F): 98.2 (20 Mar 2025 05:01), Max: 98.2 (20 Mar 2025 05:01)  HR: 79 (20 Mar 2025 05:01) (68 - 93)  BP: 137/71 (20 Mar 2025 05:01) (135/83 - 169/83)  BP(mean): 111 (19 Mar 2025 10:40) (111 - 111)  RR: 18 (20 Mar 2025 05:01) (18 - 20)  SpO2: 98% (20 Mar 2025 05:01) (95% - 100%)    Parameters below as of 20 Mar 2025 05:01  Patient On (Oxygen Delivery Method): tracheostomy collar    Physical Exam:  Gen: NAD, tracheostomy with ETT  HEENT: EOMI, MMM  Chest: equal chest rise  Cardiac: regular   Abd: non distended    Labs:                        8.9    7.25  )-----------( 172      ( 19 Mar 2025 07:05 )             28.4     CBC Full  -  ( 19 Mar 2025 07:05 )  WBC Count : 7.25 K/uL  RBC Count : 2.85 M/uL  Hemoglobin : 8.9 g/dL  Hematocrit : 28.4 %  Platelet Count - Automated : 172 K/uL  Mean Cell Volume : 99.6 fl  Mean Cell Hemoglobin : 31.2 pg  Mean Cell Hemoglobin Concentration : 31.3 g/dL    03-19    139  |  105  |  24[H]  ----------------------------<  98  3.8   |  25  |  1.51[H]    Ca    9.5      19 Mar 2025 07:07  Phos  2.4     03-19  Mg     2.0     03-19

## 2025-03-20 NOTE — PROGRESS NOTE ADULT - ASSESSMENT
69 year old male with a past medical history of hypertension, hyperlipemia VAZQUEZ (on CPAP at bedtime, NC 2 liters during the day), CKD stage 3, epilepsy, schizophrenia, developmental delay, metastatic testicular cancer (s/p orchiectomy, actively on chemotherapy, last dose of etoposide/cisplatin on 6/2024) presenting with worsening shortness of breath. Patient was recently hospitalized at [facility] from January 27th 2025 to February 6th 2025 for seizure and influenza virus infection, which required intubation. He was sent to rehab (originally from home) from hospital. He returns due to sudden onset shortness of breath and worsening oxygenation. Of note, he tested positive for COVID-19       1- SHAGUFTA on CKDIII  2- covid-19  3- anemia  4- hypotension  5- respiratory failure         SHAGUFTA suspected in setting of new infection. covid 19   creatinine is improving  bp is in range   anemia, trend hb   retacrit [id number] U tiw sq  tube feeds -> Jevity  strict I/O  trend creatinine and electroytes daily

## 2025-03-20 NOTE — PROGRESS NOTE ADULT - SUBJECTIVE AND OBJECTIVE BOX
DATE OF SERVICE: 03-20-25 @ 18:17    Patient is a 69y old  Male who presents with a chief complaint of Acute on chronic hypoxemic respiratory failure (20 Mar 2025 10:16)      INTERVAL HISTORY: Feels ok.     REVIEW OF SYSTEMS:  CONSTITUTIONAL: No weakness  EYES/ENT: No visual changes;  No throat pain   NECK: No pain or stiffness  RESPIRATORY: No cough, wheezing; No shortness of breath  CARDIOVASCULAR: No chest pain or palpitations  GASTROINTESTINAL: No abdominal  pain. No nausea, vomiting, or hematemesis  GENITOURINARY: No dysuria, frequency or hematuria  NEUROLOGICAL: No stroke like symptoms  SKIN: No rashes    	  MEDICATIONS:  metoprolol tartrate 25 milliGRAM(s) Oral every 12 hours        PHYSICAL EXAM:  T(C): 37.2 (03-20-25 @ 17:05), Max: 37.2 (03-20-25 @ 17:05)  HR: 107 (03-20-25 @ 17:38) (68 - 107)  BP: 148/89 (03-20-25 @ 17:05) (137/71 - 169/83)  RR: 18 (03-20-25 @ 17:05) (18 - 20)  SpO2: 100% (03-20-25 @ 17:38) (94% - 100%)  Wt(kg): --  I&O's Summary    19 Mar 2025 07:01  -  20 Mar 2025 07:00  --------------------------------------------------------  IN: 1770 mL / OUT: 800 mL / NET: 970 mL    20 Mar 2025 07:01  -  20 Mar 2025 18:17  --------------------------------------------------------  IN: 700 mL / OUT: 250 mL / NET: 450 mL          Appearance: In no distress	  HEENT:    PERRL, EOMI	  Cardiovascular:  S1 S2, No JVD  Respiratory: Lungs clear to auscultation	  Gastrointestinal:  Soft, Non-tender, + BS	  Vascularature:  No edema of LE  Psychiatric: Appropriate affect   Neuro: no acute focal deficits                               8.7    7.22  )-----------( 167      ( 20 Mar 2025 07:16 )             27.9     03-20    140  |  104  |  24[H]  ----------------------------<  116[H]  3.7   |  25  |  1.43[H]    Ca    9.4      20 Mar 2025 07:16  Phos  2.4     03-20  Mg     1.9     03-20          Labs personally reviewed      ASSESSMENT/PLAN: 	    69 year old male with a past medical history of hypertension, hyperlipemia VAZQUEZ (on CPAP at bedtime, NC 2 liters during the day), CKD stage 3, epilepsy, schizophrenia, developmental delay, metastatic testicular cancer (s/p orchiectomy, actively on chemotherapy, last dose of etoposide/cisplatin on 6/2024), presenting with acute on chronic hypoxemic respiratory failure, secondary to (a) COVID-19 infection, (b) superimposed pneumonia after recent influenza infection, and/or (c) fluid overload.     Problem/Plan - 1:  ·  Problem: Acute on chronic respiratory failure with hypoxia.   ·  Plan: Likely 2/2 PNA, HF  - Appreciate pulmonology.  - s/p trach 3/12  - Transferred to [facility]    Problem/Plan - 2:  ·  Problem: SHAGUFTA (acute kidney injury).   ·  Plan: Has a history of CKD stage 3, Cr 2.38 at baseline.    - Nephrology following  - BUN now improved following de-escalation of diuretics    Problem/Plan - 3:  ·  Problem: Chronic heart failure with preserved ejection fraction.   ·  Plan: TTE done here 1/17/25 technically difficult, but LV systolic fxn appears nl without any regional wall motion abnormalities  - Euvolemic, repeat BNP. On 2/23 2300  - TTE 3/17 1. No obvious Left atrial thrombus; unable to evaluate left atrial appendage on TTE 2. Compared to the transthoracic echocardiogram performed on 2/23/2025, LA difficult to compare.    Problem/Plan - 4:  ·  Problem: New onset Afib RVR (on tele, confirmed with EKG 2/19)   ·  Plan:  - Cont Metoprolol 25mg BID  - Cont Eliquis 5mg BID      Problem/Plan - 5:  ·  Problem: HTN    ·  Plan: Resolved  - 3/19 slightly above target. Will cont to monitor.           LAVELLE August DO [facility]  Cardiovascular Medicine  [address] [place] [address], [address]  Available through call or text on [employer]  Office: [phone]

## 2025-03-20 NOTE — PROGRESS NOTE ADULT - NS ATTEND AMEND GEN_ALL_CORE FT
68 yo M with a h/o metastatic testicular cancer, epilepsy on AEDs, chronic respiratory failure with hypoxia and hypercapnia on home O2, VAZQUEZ on CPAP, HFpEF admitted for acute on chronic respiratory failure with hypoxia and hypercapnia likely secondary to combination of COVID PNA, bacterial superinfection, and acute pulmonary edema due to acute on chronic HFpEF. Hypotension likely severe sepsis with septic shock. SHAGUFTA likely ATN +/- venous congestion. Repeat CT chest with bilateral GGOs possibly acute pulmonary edema vs early fibrosis vs residual COVID along with L basilar consolidation likely bacterial PNA vs atelectasis. Now s/p trach and PEG.    # Acute on chronic respiratory failure with hypoxia and hypercapnia  # COVID PNA  # Bacterial PNA  # Acute pulmonary edema  # Acute on chronic HFpEF  # Hypotension  # Severe sepsis with septic shock  # SHAGUFTA  # Hypernatremia  # Epilepsy  Clinically much improved, seen sitting in a chair, interactive and phonating  - Continue AEDs  - Tolerating TC and PMV   - Trend Cr, avoid nephrotoxins  - Completed Levaquin course   - Completed course of dexamethasone and remdesivir for COVID  - c/w PEG feeds, e/o aspiration on FEES testing on 3/18  - Hypernatremia resolved  - Therapeutic AC on hold for Afib due to prior acute anemia, but H/H now stable. On Eliquis. TTE without evidence of LA thrombus, unable to assess Left atrial appendage  -OOB to chair, PT  - Full code  d/w patient and sister at bedside

## 2025-03-20 NOTE — PROGRESS NOTE ADULT - ASSESSMENT
Mr. Gr is a 69 year old male with PMHx of seizure disorder, sleep apnea, HTN, chronic idiopathic constipation, stage III CKD, severe obesity, secondary hyperparathyroidism, anemia, thrombocytopenia, hyperlipidemia, testicular cancer under treatment with Dr. Ovalles who is admitted for acute hypoxic respiratory failure secondary to COVID vs superimposed pneumonia with new onset thrombocytopenia. He was recently discharged 02/06/2025 after a tenous stay including intubation in the ICU for respiratory failure in setting of seizure. He had recovered and was discharged to rehab.      Former smoker, quit 14y prior, denied ETOH, drug use. Lives at home. Does not have children. Denies family history of blood disorders or malignancy.  Denies previous workplace related exposures.  Denies previous history of blood transfusions.  Denied history of thromboses.  Denied history of  requiring anticoagulation.     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  Margins were negative.  pT3Nx. CT C/A/P with few left para-aortic and left iliac chain lymph nodes largest measuring 8.1 cm left para-aortic node, bilateral subcentimeter noncalcified pulmonary nodules measuring up to 7 mm. AFP, LDH, hCG were all elevated. 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 Started on adjuvant therapy with Cisplatin+Etoposide, so far completed 4 cycles of treatment with cisplatin dose reduced 50% and Etoposide 50% due to thrombocytopenia.      02/19/2025: on HFNC, noted tachycardia and fever, Klebsiella in urine as well, thrombocytopenia stable/improving, no signs of active bleeding     02/20/2025: developed A.fib with RVR and was placed on heparin drip and pending cardiology eval    02/21/2025: awake, on AVAPS overnight, noted RRT for hypoxia as pt removed HFNC at some point in time, good response thereafter     02/22/2025:  continues on AVAPS this morning, noted RRT overnight for hypoxia, hypercapnia, ICU following, US LE negative for DVT, counts overall stable/improved     02/23/2025: progressive respiratory failure, noted ongoing RTT this morning with pending intubation and transfer to MICU     02/24/2025: intubated 02/23/2025, sedated, on pressor support, no signs of bleeding, counts noted, no signs of bleeding     02/25/2025: continues in MICU, intubated and sedated, no signs of bleeding    02/26/2025: continues in MICU, intubated, without signs of bleeding, continues on heparin drip     02/27/2025:  remains under the care of the ICU, intubated, no signs of bleeding and continues on heparin drip, counts stable, has not required PRBC support this admission    02/28/2025: continues under the care of MICU, continues intubated, no signs of bleeding, on heparin drip    03/01/2025: continues in MICU, intubated, direct josefina IgG positive, eluate negative, not likely to be clinically significant     03/02/2025:  continues in MICU, nursing staff at bedside, no overnight events noted. CTH without acute intracranial pathology     03/03/2025: continues in MICU, intubated, on heparin drip, 1u PRBC overnight, no signs of bleeding, platelet count stable     03/04/2025: awake, moving arms spontaneously on exam, continues without much interaction however. No signs of bleeding, continues heparin drip, continues intubated in MICU     03/05/2025: extubated 03/04/2025, continues in MICU, awake and alert this morning and able to speak full sentences, discussed/reviewed findings, continues on heparin drip     03/06/2025: nursing staff at bedside, bipap overnight, without signs of bleeding, counts noted stable, renal function improving     03/07/2025:  no signs of bleeding, counts noted, continues heparin drip, continues in MICU    03/08/2025: on HFNC, no signs of bleeding, although alert and answering questions, not back to his baseline yet, continues in MICU    03/09/2025: continues in MICU, s/p re-intubation 03/08/2025 given respiratory compromise in the setting of copious secretions as evidenced by bronchoscopy, in setting of fever, now sedated, mild crusting of blood around mouth, without active sign of bleeding, counts noted reviewed, continues on heparin drip     03/10/2025: continues in MICU, no signs of bleeding, discussed with nursing staff at bedside, receiving 1u PRBC due to H/H downtrend, sputum culture with Pseudomonas, ICU and ID recs noted, continues to be intubated     03/11/2025: continues in MICU, intubated and sedated, noted counts, without signs of bleeding, appropriate response to transfusion    03/12/2025: continues in MICU, intubated and sedated, without signs of bleeding     03/13/2025: continues in MICU, s/p Tracheostomy 03/12/2025 with tracheal intubation, continues with sedation     03/14/2025:  awake and alert, mental status much improved, transferred from MICU to [address] [place] [address] 03/13/2025. Without significant events overnight, discussed with nursing staff at bedside, stated pt did well overnight, no signs of bleeding, no fever noted, medications provided via NGT and he is pending PEG-Tube placement today. His case is complex, noted with repeat need for intubations, discussed with pt, he is aware. Discussed with pts primary Oncologist as well.    03/15/2025: awake and alert, no overnight events noted, discussed with nursing staff, he did well overnight, no fevers, no signs of bleeding endorsed. He nods to information. Counts reviewed overall stable, discussed with him as well. Had PEG placed 03/14/2025 03/16/2025: no overnight events noted, comfortable overnight, nursing staff at bedside, stated pt did well, no signs of bleeding, had a BM. No fever reported. Counts reviewed, stable at this time     03/17/2025: no overnight events noted however continues with need for suctioning due to increase secretions, discussed with overnight staff, mild bleeding likely due to trauma from suctioning and recent tracheostomy placement without evidence of active bleeding otherwise. No fevers noted overnight. Had multiple smaller BMs, without evidence of bleeding, Nephrology recs and reviewed noted for Epo    03/18/2025: without significant overnight events, discussed with team at bedside, no evidence of bleeding, noted cardiology recs to start Eliquis 5mg BID for A.fib, currently on Lovenox, previously did require PRBC this admission but has since been stable, renal function seems to be improving, and Plt count improved, ok for AC per cardiology recs with Eliquis, monitor H/H closely     03/19/2025: comfortable, no overnight events noted, discussed with team, had 1 BM overnight without evidence of bleeding, now on Eliquis 5mg BID per cardiology recs for A.fib, renal function stable. Oxygen requirements stable overnight. No fevers reported. Pts primary Oncologist, Dr. Ovalles, aware, case discussed         #Acute hypoxic respiratory failure, Acute, Severe   # COVID  - CT noted with bilateral GGO  - ID following  - Pulmonary following  - Antibiotics per primary team/MICU and ID   - Intubated 02/23/2025 AM, MICU   - extubated 03/04/2025  - Pseudomonas from sputum culture   - Re-intubated 03/09/2025 due to increased work of breathing in setting of increased secretions, not protecting airway, tachypnea, hypoxia   - s/p Tracheostomy 03/12/2025    # Thrombocytopenia, Acute, Moderate   - Did occur during most recent admission and improved to normal prior to discharge   - Without signs of bleeding  - Could be multifactorial, possibly due to infection   - Continue to trend  - Transfuse to maintain Plt > 10k unless actively bleeding then maintain > 50k     # Brain lesion, Acute, Severe   - pt with hx of seizures  - MRI brain now with 5.4 mm enhancing lesion in the LEFT middle cerebellar peduncle with associated edema suspicious for metastases  - MRI Lumbar negative for acute disease  - Small lesion without mass effect or edema, recommended to correlate per neurology if foci and locus are concordant with seizure activity  - Neurology recs noted, new lesion not likely to cause seizure  - It is rare, but nonetheless not impossible, for testicular cancer to be metastatic to the brain  - He will need another PET-CT, can be completed outpatient once stable if concern can proceed with biopsy at that time   - Previous endocrinology eval for thyroid nodule noted  - AFP/BHCH normal,  previously   - Repeat CTH without acute intracranial pathology     # 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 Ovalles, agree with current plan  - Follow up as outpatient with Franki Ovalles MD     # Acute kidney injury on CKD Stage IIIA, Acute, Moderate   - Likely multifactorial  - Nephrology consult and recs noted  - On lovenox for DVT PPx, monitor renal function   - Continue to trend     # Normocytic anemia, Acute, Severe   - Chronic, has hx of PRBC during chemo 07/2024  - Noted Anti E, Anti-K Anti-C antibodies previously  - direct josefina IgG positive, eluate negative, not likely to be clinically significant   - s/p 2u PRBC 03/03/2025 and 03/10/2025   - Monitor for bleeding  - Epo per Nephrology   - Recommend to transfuse to maintain Hb > 7    # Klebsiella UTI, Acute, Severe > Moderate   # Pseudomonas UTI   -Antibiotics per ID and primary team       Recommendations  - Trend CBC daily, obtain post transfusion CBC today   - Transfuse to maintain Hb > 7   - Transfuse to maintain Plt >10k unless active bleeding then >50k   - Cardiologyy recs noted, now on Eliquis 5mg BID for A.fib, monitor H/H         Thank you for allowing me to participate in the care of Mr. Gr please do not hesitate to call or text me if you have further questions or concerns.     Brayan Mart MD  [facility] [place]   Division of Hematology/Oncology  [address] [place] [address], [address]  [place] [address]  P: [phone]  F: [phone]    Attestation:    ----Pt evaluated, >50min spent including face-to-face interaction in addition to chart review, reviewing treatment plan, discussing with care staff in hospital, his outside physician, and managing the patient’s chronic diagnoses as listed in the assessment----        Mr. Gr is a 69 year old male with PMHx of seizure disorder, sleep apnea, HTN, chronic idiopathic constipation, stage III CKD, severe obesity, secondary hyperparathyroidism, anemia, thrombocytopenia, hyperlipidemia, testicular cancer under treatment with Dr. Ovalles who is admitted for acute hypoxic respiratory failure secondary to COVID vs superimposed pneumonia with new onset thrombocytopenia. He was recently discharged 02/06/2025 after a tenous stay including intubation in the ICU for respiratory failure in setting of seizure. He had recovered and was discharged to rehab.      Former smoker, quit 14y prior, denied ETOH, drug use. Lives at home. Does not have children. Denies family history of blood disorders or malignancy.  Denies previous workplace related exposures.  Denies previous history of blood transfusions.  Denied history of thromboses.  Denied history of  requiring anticoagulation.     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  Margins were negative.  pT3Nx. CT C/A/P with few left para-aortic and left iliac chain lymph nodes largest measuring 8.1 cm left para-aortic node, bilateral subcentimeter noncalcified pulmonary nodules measuring up to 7 mm. AFP, LDH, hCG were all elevated. 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 Started on adjuvant therapy with Cisplatin+Etoposide, so far completed 4 cycles of treatment with cisplatin dose reduced 50% and Etoposide 50% due to thrombocytopenia.      02/19/2025: on HFNC, noted tachycardia and fever, Klebsiella in urine as well, thrombocytopenia stable/improving, no signs of active bleeding     02/20/2025: developed A.fib with RVR and was placed on heparin drip and pending cardiology eval    02/21/2025: awake, on AVAPS overnight, noted RRT for hypoxia as pt removed HFNC at some point in time, good response thereafter     02/22/2025:  continues on AVAPS this morning, noted RRT overnight for hypoxia, hypercapnia, ICU following, US LE negative for DVT, counts overall stable/improved     02/23/2025: progressive respiratory failure, noted ongoing RTT this morning with pending intubation and transfer to MICU     02/24/2025: intubated 02/23/2025, sedated, on pressor support, no signs of bleeding, counts noted, no signs of bleeding     02/25/2025: continues in MICU, intubated and sedated, no signs of bleeding    02/26/2025: continues in MICU, intubated, without signs of bleeding, continues on heparin drip     02/27/2025:  remains under the care of the ICU, intubated, no signs of bleeding and continues on heparin drip, counts stable, has not required PRBC support this admission    02/28/2025: continues under the care of MICU, continues intubated, no signs of bleeding, on heparin drip    03/01/2025: continues in MICU, intubated, direct josefina IgG positive, eluate negative, not likely to be clinically significant     03/02/2025:  continues in MICU, nursing staff at bedside, no overnight events noted. CTH without acute intracranial pathology     03/03/2025: continues in MICU, intubated, on heparin drip, 1u PRBC overnight, no signs of bleeding, platelet count stable     03/04/2025: awake, moving arms spontaneously on exam, continues without much interaction however. No signs of bleeding, continues heparin drip, continues intubated in MICU     03/05/2025: extubated 03/04/2025, continues in MICU, awake and alert this morning and able to speak full sentences, discussed/reviewed findings, continues on heparin drip     03/06/2025: nursing staff at bedside, bipap overnight, without signs of bleeding, counts noted stable, renal function improving     03/07/2025:  no signs of bleeding, counts noted, continues heparin drip, continues in MICU    03/08/2025: on HFNC, no signs of bleeding, although alert and answering questions, not back to his baseline yet, continues in MICU    03/09/2025: continues in MICU, s/p re-intubation 03/08/2025 given respiratory compromise in the setting of copious secretions as evidenced by bronchoscopy, in setting of fever, now sedated, mild crusting of blood around mouth, without active sign of bleeding, counts noted reviewed, continues on heparin drip     03/10/2025: continues in MICU, no signs of bleeding, discussed with nursing staff at bedside, receiving 1u PRBC due to H/H downtrend, sputum culture with Pseudomonas, ICU and ID recs noted, continues to be intubated     03/11/2025: continues in MICU, intubated and sedated, noted counts, without signs of bleeding, appropriate response to transfusion    03/12/2025: continues in MICU, intubated and sedated, without signs of bleeding     03/13/2025: continues in MICU, s/p Tracheostomy 03/12/2025 with tracheal intubation, continues with sedation     03/14/2025:  awake and alert, mental status much improved, transferred from MICU to [address] [place] [address] 03/13/2025. Without significant events overnight, discussed with nursing staff at bedside, stated pt did well overnight, no signs of bleeding, no fever noted, medications provided via NGT and he is pending PEG-Tube placement today. His case is complex, noted with repeat need for intubations, discussed with pt, he is aware. Discussed with pts primary Oncologist as well.    03/15/2025: awake and alert, no overnight events noted, discussed with nursing staff, he did well overnight, no fevers, no signs of bleeding endorsed. He nods to information. Counts reviewed overall stable, discussed with him as well. Had PEG placed 03/14/2025 03/16/2025: no overnight events noted, comfortable overnight, nursing staff at bedside, stated pt did well, no signs of bleeding, had a BM. No fever reported. Counts reviewed, stable at this time     03/17/2025: no overnight events noted however continues with need for suctioning due to increase secretions, discussed with overnight staff, mild bleeding likely due to trauma from suctioning and recent tracheostomy placement without evidence of active bleeding otherwise. No fevers noted overnight. Had multiple smaller BMs, without evidence of bleeding, Nephrology recs and reviewed noted for Epo    03/18/2025: without significant overnight events, discussed with team at bedside, no evidence of bleeding, noted cardiology recs to start Eliquis 5mg BID for A.fib, currently on Lovenox, previously did require PRBC this admission but has since been stable, renal function seems to be improving, and Plt count improved, ok for AC per cardiology recs with Eliquis, monitor H/H closely     03/19/2025: comfortable, no overnight events noted, discussed with team, had 1 BM overnight without evidence of bleeding, now on Eliquis 5mg BID per cardiology recs for A.fib, renal function stable. Oxygen requirements stable overnight. No fevers reported. Pts primary Oncologist, Dr. Ovalles, aware, case discussed     03/20/2025: without significant events noted, continues on Eliquis, therapeutic, without signs of bleeding, continues on Epo, counts and imaging reviewed, no overnight events noted, continues with tracheostomy without increased oxygen requirements, increased secretions noted       #Acute hypoxic respiratory failure, Acute, Severe   # COVID  - CT noted with bilateral GGO  - ID following  - Pulmonary following  - Antibiotics per primary team/MICU and ID   - Intubated 02/23/2025 AM, MICU   - extubated 03/04/2025  - Pseudomonas from sputum culture   - Re-intubated 03/09/2025 due to increased work of breathing in setting of increased secretions, not protecting airway, tachypnea, hypoxia   - s/p Tracheostomy 03/12/2025    # Thrombocytopenia, Acute, Moderate   - Did occur during most recent admission and improved to normal prior to discharge   - Without signs of bleeding  - Could be multifactorial, possibly due to infection   - Continue to trend  - Transfuse to maintain Plt > 10k unless actively bleeding then maintain > 50k     # Brain lesion Hx of cancer, Acute, Severe   - pt with hx of seizures  - MRI brain now with 5.4 mm enhancing lesion in the LEFT middle cerebellar peduncle with associated edema suspicious for metastases  - MRI Lumbar negative for acute disease  - Small lesion without mass effect or edema, recommended to correlate per neurology if foci and locus are concordant with seizure activity  - Neurology recs noted, new lesion not likely to cause seizure  - It is rare, but nonetheless not impossible, for testicular cancer to be metastatic to the brain  - He will need another PET-CT, can be completed outpatient once stable if concern can proceed with biopsy at that time   - Previous endocrinology eval for thyroid nodule noted  - AFP/BHCH normal,  previously   - Repeat CTH without acute intracranial pathology     # 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 Ovalels, agree with current plan  - Follow up as outpatient with Franki Ovalles MD     # Acute kidney injury on CKD Stage IIIA, Acute, Moderate   - Likely multifactorial  - Nephrology consult and recs noted  - Now on therapeutic Eliquis, will need to be monitoring in high risk setting for bleeding   - Continue to trend     # Normocytic anemia, Acute, Severe   - Chronic, has hx of PRBC during chemo 07/2024  - Noted Anti E, Anti-K Anti-C antibodies previously  - direct josefina IgG positive, eluate negative, not likely to be clinically significant   - s/p 2u PRBC 03/03/2025 and 03/10/2025   - Monitor for bleeding  - Epo ordered  - Recommend to transfuse to maintain Hb > 7    # Klebsiella UTI, Acute, Severe > Moderate   # Pseudomonas UTI, Acute, Moderate   -Antibiotics per ID and primary team       Recommendations  - Trend CBC daily, obtain post transfusion CBC today   - Transfuse to maintain Hb > 7   - Transfuse to maintain Plt >10k unless active bleeding then >50k   - Cardiologyy recs noted, now on Eliquis 5mg BID for A.fib, monitor H/H         Thank you for allowing me to participate in the care of Mr. Gr please do not hesitate to call or text me if you have further questions or concerns.     Brayan Mart MD  [facility] [place]   Division of Hematology/Oncology  [facility], [address]  [place] [address]  P: [phone]  F: [phone]    Attestation:    ----Pt evaluated, >50min spent including face-to-face interaction in addition to chart review, reviewing treatment plan, discussing with care staff in hospital, his outside physician, and managing the patient’s chronic diagnoses as listed in the assessment----

## 2025-03-20 NOTE — PROGRESS NOTE ADULT - SUBJECTIVE AND OBJECTIVE BOX
[facility]  JACINTO Horton Y. Patel, S. Shah, G. Casimir  [phone]  ([phone] - weekdays after 5pm and weekends)    Name: SOCAR MILAN  Age/Gender: 69y Male  MRN: [id number]    Interval History:  Patient seen and examined this morning.   No new complaints noted.  Notes reviewed  No concerning overnight events  Afebrile   Allergies: seasonal allergies (Unknown)  No Known Drug Allergies      Objective:  Vitals:   T(F): 98.7 (03-20-25 @ 11:34), Max: 98.7 (03-20-25 @ 11:34)  HR: 80 (03-20-25 @ 11:34) (68 - 88)  BP: 140/86 (03-20-25 @ 11:34) (137/71 - 169/83)  RR: 18 (03-20-25 @ 11:34) (18 - 20)  SpO2: 100% (03-20-25 @ 11:34) (95% - 100%)  Physical Examination:  General: no acute distress, nontoxic   HEENT: NC/AT, anicteric, trach collar   Respiratory: breathing comfortably  Cardiovascular: S1 and S2 present  Gastrointestinal: nondistended  Extremities: no edema, no cyanosis  Skin: no visible rash    Laboratory Studies:  CBC:                       8.7    7.22  )-----------( 167      ( 20 Mar 2025 07:16 )             27.9     WBC Trend:  7.22 03-20-25 @ 07:16  7.25 03-19-25 @ 07:05  7.13 03-18-25 @ 07:13  7.56 03-17-25 @ 06:35  7.61 03-16-25 @ 09:28  7.74 03-15-25 @ 09:38  11.78 03-14-25 @ 07:13    CMP: 03-20    140  |  104  |  24[H]  ----------------------------<  116[H]  3.7   |  25  |  1.43[H]    Ca    9.4      20 Mar 2025 07:16  Phos  2.4     03-20  Mg     1.9     03-20      Creatinine: 1.43 mg/dL (03-20-25 @ 07:16)  Creatinine: 1.51 mg/dL (03-19-25 @ 07:07)  Creatinine: 1.67 mg/dL (03-18-25 @ 07:13)  Creatinine: 1.73 mg/dL (03-17-25 @ 06:35)  Creatinine: 2.01 mg/dL (03-16-25 @ 09:29)  Creatinine: 2.28 mg/dL (03-15-25 @ 09:38)  Creatinine: 2.67 mg/dL (03-14-25 @ 07:14)    Microbiology: reviewed   Culture - Urine (collected 03-09-25 @ 18:14)  Source: Catheterized Catheterized  Final Report (03-11-25 @ 13:23):    >100,000 CFU/ml Pseudomonas aeruginosa  Organism: Pseudomonas aeruginosa (03-11-25 @ 13:23)  Organism: Pseudomonas aeruginosa (03-11-25 @ 13:23)      Method Type: MARIELENA      -  Amikacin: S <=16      -  Aztreonam: R >16      -  Cefepime: I 16      -  Ceftazidime: R >16      -  Ciprofloxacin: S <=0.25      -  Imipenem: S <=1      -  Levofloxacin: S <=0.5      -  Meropenem: S <=1      -  Piperacillin/Tazobactam: R >64    Culture - Bronchial (collected 03-08-25 @ 15:27)  Source: Bronchial Bronchial Lavage  Gram Stain (03-09-25 @ 00:20):    Moderate polymorphonuclear leukocytes per low power field    No Squamous epithelial cells per low power field    Moderate Gram Negative Rods per oil power field    Few Gram positive cocci in pairs per oil power field  Final Report (03-10-25 @ 15:13):    Moderate Pseudomonas aeruginosa    Commensal lucia consistent with body site  Organism: Pseudomonas aeruginosa (03-10-25 @ 15:13)  Organism: Pseudomonas aeruginosa (03-10-25 @ 15:13)      Method Type: MARIELENA      -  Aztreonam: S 8      -  Cefepime: S 8      -  Ceftazidime: S 8      -  Ciprofloxacin: S <=0.25      -  Imipenem: S <=1      -  Levofloxacin: S <=0.5      -  Meropenem: S <=1      -  Piperacillin/Tazobactam: R 64    Culture - Blood (collected 03-08-25 @ 00:00)  Source: Blood Blood  Final Report (03-13-25 @ 04:00):    No growth at 5 days    Culture - Blood (collected 03-07-25 @ 23:30)  Source: Blood Blood  Final Report (03-13-25 @ 04:00):    No growth at 5 days    Culture - Sputum (collected 03-07-25 @ 18:42)  Source: Sputum Sputum  Gram Stain (03-08-25 @ 07:01):    Rare polymorphonuclear leukocytes per low power field    Few Squamous epithelial cells per low power field    Moderate Gram Negative Rods seen per oil power field  Final Report (03-09-25 @ 16:20):    Numerous Pseudomonas aeruginosa    Commensal lucia consistent with body site  Organism: Pseudomonas aeruginosa (03-09-25 @ 16:20)  Organism: Pseudomonas aeruginosa (03-09-25 @ 16:20)      Method Type: MARIELENA      -  Aztreonam: S 8      -  Cefepime: S 8      -  Ceftazidime: S 8      -  Ciprofloxacin: S <=0.25      -  Imipenem: S <=1      -  Levofloxacin: S <=0.5      -  Meropenem: S <=1      -  Piperacillin/Tazobactam: R 64    Radiology: reviewed     Medications:  acetaminophen     Tablet .. 650 milliGRAM(s) Oral every 6 hours PRN  albuterol/ipratropium for Nebulization 3 milliLiter(s) Nebulizer every 6 hours  aluminum hydroxide/magnesium hydroxide/simethicone Suspension 30 milliLiter(s) Oral every 4 hours PRN  apixaban 5 milliGRAM(s) Enteral Tube every 12 hours  artificial tears (preservative free) Ophthalmic Solution 1 Drop(s) Both EYES every 6 hours PRN  atorvastatin 20 milliGRAM(s) Oral at bedtime  chlorhexidine 2% Cloths 1 Application(s) Topical <User Schedule>  cholecalciferol 1000 Unit(s) Oral daily  epoetin krystyna-epbx (RETACRIT) Injectable 06057 Unit(s) SubCutaneous <User Schedule>  escitalopram 15 milliGRAM(s) Oral with breakfast  gabapentin Solution 150 milliGRAM(s) Oral every 12 hours  influenza  Vaccine (HIGH DOSE) 0.5 milliLiter(s) IntraMuscular once  lacosamide Solution 200 milliGRAM(s) Oral two times a day  lamoTRIgine 50 milliGRAM(s) Oral two times a day  lanolin Ointment 1 Application(s) Topical daily  levETIRAcetam  IVPB 750 milliGRAM(s) IV Intermittent every 12 hours  lurasidone 20 milliGRAM(s) Oral at bedtime  melatonin 3 milliGRAM(s) Oral at bedtime PRN  metoprolol tartrate 25 milliGRAM(s) Oral every 12 hours  nystatin Powder 1 Application(s) Topical every 8 hours  ondansetron Injectable 4 milliGRAM(s) IV Push every 8 hours PRN  pantoprazole  Injectable 40 milliGRAM(s) IV Push daily  polyethylene glycol 3350 17 Gram(s) Oral daily  potassium phosphate / sodium phosphate Tablet (K-PHOS No. 2) 1 Tablet(s) Oral every 8 hours    Prior/Completed Antimicrobials:  levoFLOXacin IVPB  levoFLOXacin IVPB  levoFLOXacin IVPB  piperacillin/tazobactam IVPB.  piperacillin/tazobactam IVPB.-  piperacillin/tazobactam IVPB.-  piperacillin/tazobactam IVPB..  piperacillin/tazobactam IVPB..  piperacillin/tazobactam IVPB...  remdesivir  IVPB  remdesivir  IVPB  remdesivir  IVPB  vancomycin  IVPB.

## 2025-03-21 LAB
ANION GAP SERPL CALC-SCNC: 9 MMOL/L — SIGNIFICANT CHANGE UP (ref 5–17)
BUN SERPL-MCNC: 24 MG/DL — HIGH (ref 7–23)
CALCIUM SERPL-MCNC: 9.4 MG/DL — SIGNIFICANT CHANGE UP (ref 8.4–10.5)
CHLORIDE SERPL-SCNC: 104 MMOL/L — SIGNIFICANT CHANGE UP (ref 96–108)
CO2 SERPL-SCNC: 28 MMOL/L — SIGNIFICANT CHANGE UP (ref 22–31)
CREAT SERPL-MCNC: 1.42 MG/DL — HIGH (ref 0.5–1.3)
EGFR: 54 ML/MIN/1.73M2 — LOW
EGFR: 54 ML/MIN/1.73M2 — LOW
GLUCOSE SERPL-MCNC: 108 MG/DL — HIGH (ref 70–99)
HCT VFR BLD CALC: 27.7 % — LOW (ref 39–50)
HGB BLD-MCNC: 8.6 G/DL — LOW (ref 13–17)
MAGNESIUM SERPL-MCNC: 1.9 MG/DL — SIGNIFICANT CHANGE UP (ref 1.6–2.6)
MCHC RBC-ENTMCNC: 30.7 PG — SIGNIFICANT CHANGE UP (ref 27–34)
MCHC RBC-ENTMCNC: 31 G/DL — LOW (ref 32–36)
MCV RBC AUTO: 98.9 FL — SIGNIFICANT CHANGE UP (ref 80–100)
NRBC BLD AUTO-RTO: 0 /100 WBCS — SIGNIFICANT CHANGE UP (ref 0–0)
PHOSPHATE SERPL-MCNC: 2.8 MG/DL — SIGNIFICANT CHANGE UP (ref 2.5–4.5)
PLATELET # BLD AUTO: 176 K/UL — SIGNIFICANT CHANGE UP (ref 150–400)
POTASSIUM SERPL-MCNC: 4.1 MMOL/L — SIGNIFICANT CHANGE UP (ref 3.5–5.3)
POTASSIUM SERPL-SCNC: 4.1 MMOL/L — SIGNIFICANT CHANGE UP (ref 3.5–5.3)
RBC # BLD: 2.8 M/UL — LOW (ref 4.2–5.8)
RBC # FLD: 17.2 % — HIGH (ref 10.3–14.5)
SODIUM SERPL-SCNC: 141 MMOL/L — SIGNIFICANT CHANGE UP (ref 135–145)
WBC # BLD: 7.95 K/UL — SIGNIFICANT CHANGE UP (ref 3.8–10.5)
WBC # FLD AUTO: 7.95 K/UL — SIGNIFICANT CHANGE UP (ref 3.8–10.5)

## 2025-03-21 RX ORDER — SOD PHOS DI, MONO/K PHOS MONO 250 MG
1 TABLET ORAL ONCE
Refills: 0 | Status: COMPLETED | OUTPATIENT
Start: 2025-03-21 | End: 2025-03-21

## 2025-03-21 RX ORDER — LEVETIRACETAM 10 MG/ML
750 INJECTION, SOLUTION INTRAVENOUS
Refills: 0 | Status: DISCONTINUED | OUTPATIENT
Start: 2025-03-21 | End: 2025-03-28

## 2025-03-21 RX ADMIN — IPRATROPIUM BROMIDE AND ALBUTEROL SULFATE 3 MILLILITER(S): .5; 2.5 SOLUTION RESPIRATORY (INHALATION) at 23:02

## 2025-03-21 RX ADMIN — Medication 1000 UNIT(S): at 12:50

## 2025-03-21 RX ADMIN — GABAPENTIN 150 MILLIGRAM(S): 400 CAPSULE ORAL at 05:30

## 2025-03-21 RX ADMIN — IPRATROPIUM BROMIDE AND ALBUTEROL SULFATE 3 MILLILITER(S): .5; 2.5 SOLUTION RESPIRATORY (INHALATION) at 06:31

## 2025-03-21 RX ADMIN — Medication 1 APPLICATION(S): at 12:50

## 2025-03-21 RX ADMIN — POLYETHYLENE GLYCOL 3350 17 GRAM(S): 17 POWDER, FOR SOLUTION ORAL at 12:50

## 2025-03-21 RX ADMIN — LAMOTRIGINE 50 MILLIGRAM(S): 150 TABLET ORAL at 05:31

## 2025-03-21 RX ADMIN — NYSTATIN 1 APPLICATION(S): 100000 CREAM TOPICAL at 13:06

## 2025-03-21 RX ADMIN — Medication 1 PACKET(S): at 12:51

## 2025-03-21 RX ADMIN — LACOSAMIDE 200 MILLIGRAM(S): 150 TABLET, FILM COATED ORAL at 17:31

## 2025-03-21 RX ADMIN — Medication 40 MILLIGRAM(S): at 12:50

## 2025-03-21 RX ADMIN — IPRATROPIUM BROMIDE AND ALBUTEROL SULFATE 3 MILLILITER(S): .5; 2.5 SOLUTION RESPIRATORY (INHALATION) at 11:01

## 2025-03-21 RX ADMIN — LEVETIRACETAM 400 MILLIGRAM(S): 10 INJECTION, SOLUTION INTRAVENOUS at 05:30

## 2025-03-21 RX ADMIN — NYSTATIN 1 APPLICATION(S): 100000 CREAM TOPICAL at 05:31

## 2025-03-21 RX ADMIN — LURASIDONE HYDROCHLORIDE 20 MILLIGRAM(S): 120 TABLET, FILM COATED ORAL at 21:27

## 2025-03-21 RX ADMIN — METOPROLOL SUCCINATE 25 MILLIGRAM(S): 50 TABLET, EXTENDED RELEASE ORAL at 17:31

## 2025-03-21 RX ADMIN — ATORVASTATIN CALCIUM 20 MILLIGRAM(S): 80 TABLET, FILM COATED ORAL at 21:27

## 2025-03-21 RX ADMIN — ESCITALOPRAM OXALATE 15 MILLIGRAM(S): 20 TABLET ORAL at 05:31

## 2025-03-21 RX ADMIN — APIXABAN 5 MILLIGRAM(S): 2.5 TABLET, FILM COATED ORAL at 17:33

## 2025-03-21 RX ADMIN — IPRATROPIUM BROMIDE AND ALBUTEROL SULFATE 3 MILLILITER(S): .5; 2.5 SOLUTION RESPIRATORY (INHALATION) at 17:22

## 2025-03-21 RX ADMIN — APIXABAN 5 MILLIGRAM(S): 2.5 TABLET, FILM COATED ORAL at 05:31

## 2025-03-21 RX ADMIN — LAMOTRIGINE 50 MILLIGRAM(S): 150 TABLET ORAL at 17:31

## 2025-03-21 RX ADMIN — Medication 1 TABLET(S): at 05:31

## 2025-03-21 RX ADMIN — Medication 1 APPLICATION(S): at 05:54

## 2025-03-21 RX ADMIN — LACOSAMIDE 200 MILLIGRAM(S): 150 TABLET, FILM COATED ORAL at 05:30

## 2025-03-21 RX ADMIN — METOPROLOL SUCCINATE 25 MILLIGRAM(S): 50 TABLET, EXTENDED RELEASE ORAL at 05:31

## 2025-03-21 RX ADMIN — NYSTATIN 1 APPLICATION(S): 100000 CREAM TOPICAL at 21:27

## 2025-03-21 RX ADMIN — LEVETIRACETAM 750 MILLIGRAM(S): 10 INJECTION, SOLUTION INTRAVENOUS at 18:15

## 2025-03-21 RX ADMIN — GABAPENTIN 150 MILLIGRAM(S): 400 CAPSULE ORAL at 17:32

## 2025-03-21 NOTE — PROGRESS NOTE ADULT - ASSESSMENT
69 year old male with a past medical history of hypertension, hyperlipemia VAZQUEZ (on CPAP at bedtime, NC 2 liters during the day), CKD stage 3, epilepsy, schizophrenia, developmental delay, metastatic testicular cancer (s/p orchiectomy, actively on chemotherapy, last dose of etoposide/cisplatin on 6/2024), presenting with acute on chronic hypoxemic respiratory failure, secondary to (a) COVID-19 infection, (b) superimposed pneumonia after recent influenza infection,  Transferred to MICU on 2/23 after RRT on floors due to hypoxia even with max setting AVAPS. Patient started on midodrine and weaned off of levo on 2/24. Failed pressure support on 2/25 and started diuresis with Bumex. CTH was unremarkable and weaned off of Precedex on 3/1. On 3/4 patient extubated and GOC conversations with family revealed that they would want patient to be trached. On 3/7, patient found to have RLL consolidation on POCUS and started on Zosyn, intubated on 3/8 due to increased lethargy and inability to clear oral secretions. Trach placed on 3/12. Transferred to RCU on 3/13. S/p Peg 3/14.  EGD with normal findings. Tolerating TC ATC since 3/16.   TTE performed 3/18 no obvious sign of left atrial appendage. Recommended by Cards and Cleared by heme for Eliquis 5mg BID. H/H stable. Tolerating PMV trials. Failed FEES 3/18. Trach downsized to a 7 Cuffless Portex on 3/20; Fio2 at 28%.       3/21: No events reported overnight. Patient continues to work with Speech for therapeutic swallowing exercises and PT for possible transfer to acute rehab. PMR to re-eval on Monday for final determination.

## 2025-03-21 NOTE — PROGRESS NOTE ADULT - SUBJECTIVE AND OBJECTIVE BOX
[facility]   [phone]  RENAL FOLLOW UP NOTE  --------------------------------------------------------------------------------  Chief Complaint:    24 hour events/subjective:    seen earlier   on trach collar   answers with gestures     PAST HISTORY  --------------------------------------------------------------------------------  No significant changes to PMH, PSH, FHx, SHx, unless otherwise noted    ALLERGIES & MEDICATIONS  --------------------------------------------------------------------------------  Allergies    seasonal allergies (Unknown)  No Known Drug Allergies    Intolerances    latex (Rash)    Standing Inpatient Medications  albuterol/ipratropium for Nebulization 3 milliLiter(s) Nebulizer every 6 hours  apixaban 5 milliGRAM(s) Enteral Tube every 12 hours  atorvastatin 20 milliGRAM(s) Oral at bedtime  chlorhexidine 2% Cloths 1 Application(s) Topical <User Schedule>  cholecalciferol 1000 Unit(s) Oral daily  epoetin krystyna-epbx (RETACRIT) Injectable 47859 Unit(s) SubCutaneous <User Schedule>  escitalopram 15 milliGRAM(s) Oral with breakfast  gabapentin Solution 150 milliGRAM(s) Oral every 12 hours  influenza  Vaccine (HIGH DOSE) 0.5 milliLiter(s) IntraMuscular once  lacosamide Solution 200 milliGRAM(s) Oral two times a day  lamoTRIgine 50 milliGRAM(s) Oral two times a day  lanolin Ointment 1 Application(s) Topical daily  levETIRAcetam  Solution 750 milliGRAM(s) Oral two times a day  lurasidone 20 milliGRAM(s) Oral at bedtime  metoprolol tartrate 25 milliGRAM(s) Oral every 12 hours  nystatin Powder 1 Application(s) Topical every 8 hours  pantoprazole   Suspension 40 milliGRAM(s) Oral daily  polyethylene glycol 3350 17 Gram(s) Oral daily    PRN Inpatient Medications  acetaminophen     Tablet .. 650 milliGRAM(s) Oral every 6 hours PRN  aluminum hydroxide/magnesium hydroxide/simethicone Suspension 30 milliLiter(s) Oral every 4 hours PRN  artificial tears (preservative free) Ophthalmic Solution 1 Drop(s) Both EYES every 6 hours PRN  melatonin 3 milliGRAM(s) Oral at bedtime PRN  ondansetron Injectable 4 milliGRAM(s) IV Push every 8 hours PRN      REVIEW OF SYSTEMS  --------------------------------------------------------------------------------    Gen: denies  fevers/chills, or HA or dizziness  CVS: denies chest pain/palpitations  Resp: denies SOB/Cough +   GI: Denies N/V/Abd pain  : Denies dysuria    VITALS/PHYSICAL EXAM  --------------------------------------------------------------------------------  T(C): 36.9 (03-21-25 @ 17:54), Max: 36.9 (03-21-25 @ 17:54)  HR: 101 (03-21-25 @ 21:35) (71 - 112)  BP: 149/98 (03-21-25 @ 17:54) (131/79 - 163/83)  RR: 18 (03-21-25 @ 21:35) (16 - 20)  SpO2: 96% (03-21-25 @ 21:35) (90% - 100%)  Wt(kg): --        03-20-25 @ 07:01  -  03-21-25 @ 07:00  --------------------------------------------------------  IN: 1770 mL / OUT: 800 mL / NET: 970 mL    03-21-25 @ 07:01  -  03-21-25 @ 23:25  --------------------------------------------------------  IN: 0 mL / OUT: 550 mL / NET: -550 mL      Physical Exam:  	        Gen: ill appearing male, trach collar  	Pulm: decrease bs, +coarse bs    	CV: No JVD. RRR, S1S2; no rub  	Abd: +BS, soft, nondistended, Peg  	UE: Warm, no cyanosis  no clubbing,  no edema;  	LE: Warm, no cyanosis  no clubbing, no edema    LABS/STUDIES  --------------------------------------------------------------------------------              8.6    7.95  >-----------<  176      [03-21-25 @ 07:00]              27.7     141  |  104  |  24  ----------------------------<  108      [03-21-25 @ 07:00]  4.1   |  28  |  1.42        Ca     9.4     [03-21-25 @ 07:00]      Mg     1.9     [03-21-25 @ 07:00]      Phos  2.8     [03-21-25 @ 07:00]            Creatinine Trend:  SCr 1.42 [03-21 @ 07:00]  SCr 1.43 [03-20 @ 07:16]  SCr 1.51 [03-19 @ 07:07]  SCr 1.67 [03-18 @ 07:13]  SCr 1.73 [03-17 @ 06:35]            Ferritin [id number]      [02-23-25 @ 06:15]  TSH 0.87      [01-25-25 @ 11:31]

## 2025-03-21 NOTE — PROGRESS NOTE ADULT - SUBJECTIVE AND OBJECTIVE BOX
Patient is a 69y old  Male who presents with a chief complaint of Acute on chronic hypoxemic respiratory failure (21 Mar 2025 04:57)      Interval Events: No events reported overnight     REVIEW OF SYSTEMS:  [ ] Positive  [ ] All other systems negative  [ ] Unable to assess ROS because ________    Vital Signs Last 24 Hrs  T(C): 36.8 (03-21-25 @ 04:50), Max: 37.2 (03-20-25 @ 17:05)  T(F): 98.3 (03-21-25 @ 04:50), Max: 99 (03-20-25 @ 17:05)  HR: 71 (03-21-25 @ 04:50) (71 - 107)  BP: 147/93 (03-21-25 @ 04:50) (131/79 - 148/89)  RR: 18 (03-21-25 @ 04:50) (18 - 20)  SpO2: 98% (03-21-25 @ 04:50) (94% - 100%)    PHYSICAL EXAM:  HEENT:   [ ]Tracheostomy:  [ ]Pupils equal  [ ]No oral lesions  [ ]Abnormal    SKIN  [ ]No Rash  [ ] Abnormal  [ ] pressure    CARDIAC  [ ]Regular  [ ]Abnormal    PULMONARY  [ ]Bilateral Clear Breath Sounds  [ ]Normal Excursion  [ ]Abnormal    GI  [ ]PEG      [ ] +BS		              [ ]Soft, nondistended, nontender	  [ ]Abnormal    MUSCULOSKELETAL                                   [ ]Bedbound                 [ ]Abnormal    [ ]Ambulatory/OOB to chair                           EXTREMITIES                                         [ ]Normal  [ ]Edema                           NEUROLOGIC  [ ] Normal, non focal  [ ] Focal findings:    PSYCHIATRIC  [ ]Alert and appropriate  [ ] Sedated	 [ ]Agitated    :  [place]: [ ] Yes, if yes: Date of Placement:                   [  ] No    LINES: Central Lines [ ] Yes, if yes: Date of Placement                                     [  ] No    HOSPITAL MEDICATIONS:  MEDICATIONS  (STANDING):  albuterol/ipratropium for Nebulization 3 milliLiter(s) Nebulizer every 6 hours  apixaban 5 milliGRAM(s) Enteral Tube every 12 hours  atorvastatin 20 milliGRAM(s) Oral at bedtime  chlorhexidine 2% Cloths 1 Application(s) Topical <User Schedule>  cholecalciferol 1000 Unit(s) Oral daily  epoetin krystyna-epbx (RETACRIT) Injectable 84143 Unit(s) SubCutaneous <User Schedule>  escitalopram 15 milliGRAM(s) Oral with breakfast  gabapentin Solution 150 milliGRAM(s) Oral every 12 hours  influenza  Vaccine (HIGH DOSE) 0.5 milliLiter(s) IntraMuscular once  lacosamide Solution 200 milliGRAM(s) Oral two times a day  lamoTRIgine 50 milliGRAM(s) Oral two times a day  lanolin Ointment 1 Application(s) Topical daily  levETIRAcetam  IVPB 750 milliGRAM(s) IV Intermittent every 12 hours  lurasidone 20 milliGRAM(s) Oral at bedtime  metoprolol tartrate 25 milliGRAM(s) Oral every 12 hours  nystatin Powder 1 Application(s) Topical every 8 hours  pantoprazole  Injectable 40 milliGRAM(s) IV Push daily  polyethylene glycol 3350 17 Gram(s) Oral daily    MEDICATIONS  (PRN):  acetaminophen     Tablet .. 650 milliGRAM(s) Oral every 6 hours PRN Temp greater or equal to 38C (100.4F), Mild Pain (1 - 3)  aluminum hydroxide/magnesium hydroxide/simethicone Suspension 30 milliLiter(s) Oral every 4 hours PRN Dyspepsia  artificial tears (preservative free) Ophthalmic Solution 1 Drop(s) Both EYES every 6 hours PRN Dry Eyes  melatonin 3 milliGRAM(s) Oral at bedtime PRN Insomnia  ondansetron Injectable 4 milliGRAM(s) IV Push every 8 hours PRN Nausea and/or Vomiting      LABS:                        8.7    7.22  )-----------( 167      ( 20 Mar 2025 07:16 )             27.9     03-20    140  |  104  |  24[H]  ----------------------------<  116[H]  3.7   |  25  |  1.43[H]    Ca    9.4      20 Mar 2025 07:16  Phos  2.4     03-20  Mg     1.9     03-20        Urinalysis Basic - ( 20 Mar 2025 07:16 )    Color: x / Appearance: x / SG: x / pH: x  Gluc: 116 mg/dL / Ketone: x  / Bili: x / Urobili: x   Blood: x / Protein: x / Nitrite: x   Leuk Esterase: x / RBC: x / WBC x   Sq Epi: x / Non Sq Epi: x / Bacteria: x          CAPILLARY BLOOD GLUCOSE    MICROBIOLOGY:     RADIOLOGY:  [ ] Reviewed and interpreted by me     Patient is a 69y old  Male who presents with a chief complaint of Acute on chronic hypoxemic respiratory failure (21 Mar 2025 04:57)      Interval Events: No events reported overnight     REVIEW OF SYSTEMS:  [ ] Positive  [x] All other systems negative  [ ] Unable to assess ROS because ________    Vital Signs Last 24 Hrs  T(C): 36.8 (03-21-25 @ 04:50), Max: 37.2 (03-20-25 @ 17:05)  T(F): 98.3 (03-21-25 @ 04:50), Max: 99 (03-20-25 @ 17:05)  HR: 71 (03-21-25 @ 04:50) (71 - 107)  BP: 147/93 (03-21-25 @ 04:50) (131/79 - 148/89)  RR: 18 (03-21-25 @ 04:50) (18 - 20)  SpO2: 98% (03-21-25 @ 04:50) (94% - 100%)    PHYSICAL EXAM:  HEENT:   [X]Tracheostomy: #7 cuffless  portex   [X]Pupils equal  [ ]No oral lesions  [ ]Abnormal    SKIN  [ ]No Rash  [X] Abnormal: macerated skin noted on perineum, buttocks, and inner thighs; Lt occipital scalp injury   [ ] pressure    CARDIAC  [X]Regular  [ ]Abnormal    PULMONARY  [X]Bilateral Clear Breath Sounds  [ ]Normal Excursion  [ ]Abnormal    GI  [X]PEG site c/d/i      [X] +BS		              [X]Soft, nondistended, nontender	  [ ]Abnormal    MUSCULOSKELETAL                                   [X]Bedbound                 [ ]Abnormal    [ ]Ambulatory/OOB to chair                           EXTREMITIES                                         [X]Normal  [ ]Edema                           NEUROLOGIC  [X] Normal, non focal  [ ] Focal findings:    PSYCHIATRIC  [X]Alert and appropriate Mood   [ ] Sedated	 [ ]Agitated    :  [place]: [ ] Yes, if yes: Date of Placement:                   [X] No    LINES: Central Lines [ ] Yes, if yes: Date of Placement                                     [X] No    HOSPITAL MEDICATIONS:  MEDICATIONS  (STANDING):  albuterol/ipratropium for Nebulization 3 milliLiter(s) Nebulizer every 6 hours  apixaban 5 milliGRAM(s) Enteral Tube every 12 hours  atorvastatin 20 milliGRAM(s) Oral at bedtime  chlorhexidine 2% Cloths 1 Application(s) Topical <User Schedule>  cholecalciferol 1000 Unit(s) Oral daily  epoetin krystyna-epbx (RETACRIT) Injectable 18725 Unit(s) SubCutaneous <User Schedule>  escitalopram 15 milliGRAM(s) Oral with breakfast  gabapentin Solution 150 milliGRAM(s) Oral every 12 hours  influenza  Vaccine (HIGH DOSE) 0.5 milliLiter(s) IntraMuscular once  lacosamide Solution 200 milliGRAM(s) Oral two times a day  lamoTRIgine 50 milliGRAM(s) Oral two times a day  lanolin Ointment 1 Application(s) Topical daily  levETIRAcetam  IVPB 750 milliGRAM(s) IV Intermittent every 12 hours  lurasidone 20 milliGRAM(s) Oral at bedtime  metoprolol tartrate 25 milliGRAM(s) Oral every 12 hours  nystatin Powder 1 Application(s) Topical every 8 hours  pantoprazole  Injectable 40 milliGRAM(s) IV Push daily  polyethylene glycol 3350 17 Gram(s) Oral daily    MEDICATIONS  (PRN):  acetaminophen     Tablet .. 650 milliGRAM(s) Oral every 6 hours PRN Temp greater or equal to 38C (100.4F), Mild Pain (1 - 3)  aluminum hydroxide/magnesium hydroxide/simethicone Suspension 30 milliLiter(s) Oral every 4 hours PRN Dyspepsia  artificial tears (preservative free) Ophthalmic Solution 1 Drop(s) Both EYES every 6 hours PRN Dry Eyes  melatonin 3 milliGRAM(s) Oral at bedtime PRN Insomnia  ondansetron Injectable 4 milliGRAM(s) IV Push every 8 hours PRN Nausea and/or Vomiting      LABS:                        8.7    7.22  )-----------( 167      ( 20 Mar 2025 07:16 )             27.9     03-20    140  |  104  |  24[H]  ----------------------------<  116[H]  3.7   |  25  |  1.43[H]    Ca    9.4      20 Mar 2025 07:16  Phos  2.4     03-20  Mg     1.9     03-20        Urinalysis Basic - ( 20 Mar 2025 07:16 )    Color: x / Appearance: x / SG: x / pH: x  Gluc: 116 mg/dL / Ketone: x  / Bili: x / Urobili: x   Blood: x / Protein: x / Nitrite: x   Leuk Esterase: x / RBC: x / WBC x   Sq Epi: x / Non Sq Epi: x / Bacteria: x          CAPILLARY BLOOD GLUCOSE    MICROBIOLOGY:     RADIOLOGY:  [ ] Reviewed and interpreted by me     Patient is a 69y old  Male who presents with a chief complaint of Acute on chronic hypoxemic respiratory failure (21 Mar 2025 04:57)      Interval Events: No events reported overnight     REVIEW OF SYSTEMS:  [ ] Positive  [x] All other systems negative  [ ] Unable to assess ROS because ________    Vital Signs Last 24 Hrs  T(C): 36.8 (03-21-25 @ 04:50), Max: 37.2 (03-20-25 @ 17:05)  T(F): 98.3 (03-21-25 @ 04:50), Max: 99 (03-20-25 @ 17:05)  HR: 71 (03-21-25 @ 04:50) (71 - 107)  BP: 147/93 (03-21-25 @ 04:50) (131/79 - 148/89)  RR: 18 (03-21-25 @ 04:50) (18 - 20)  SpO2: 98% (03-21-25 @ 04:50) (94% - 100%)    PHYSICAL EXAM:  HEENT:   [X]Tracheostomy: #7 cuffless Portex   [X]Pupils equal  [ ]No oral lesions  [ ]Abnormal    SKIN  [ ]No Rash  [X] Abnormal: macerated skin noted on perineum, buttocks, and inner thighs; Lt occipital scalp injury   [ ] pressure    CARDIAC  [X]Regular  [ ]Abnormal    PULMONARY  [X]Bilateral Clear Breath Sounds  [ ]Normal Excursion  [ ]Abnormal    GI  [X]PEG site c/d/i      [X] +BS		              [X]Soft, nondistended, nontender	  [ ]Abnormal    MUSCULOSKELETAL                                   [X]Bedbound                 [ ]Abnormal    [ ]Ambulatory/OOB to chair                           EXTREMITIES                                         [X]Normal  [ ]Edema                           NEUROLOGIC  [X] Normal, non focal  [ ] Focal findings:    PSYCHIATRIC  [X]Alert and appropriate Mood   [ ] Sedated	 [ ]Agitated    :  [place]: [ ] Yes, if yes: Date of Placement:                   [X] No    LINES: Central Lines [ ] Yes, if yes: Date of Placement                                     [X] No    HOSPITAL MEDICATIONS:  MEDICATIONS  (STANDING):  albuterol/ipratropium for Nebulization 3 milliLiter(s) Nebulizer every 6 hours  apixaban 5 milliGRAM(s) Enteral Tube every 12 hours  atorvastatin 20 milliGRAM(s) Oral at bedtime  chlorhexidine 2% Cloths 1 Application(s) Topical <User Schedule>  cholecalciferol 1000 Unit(s) Oral daily  epoetin krystyna-epbx (RETACRIT) Injectable 42898 Unit(s) SubCutaneous <User Schedule>  escitalopram 15 milliGRAM(s) Oral with breakfast  gabapentin Solution 150 milliGRAM(s) Oral every 12 hours  influenza  Vaccine (HIGH DOSE) 0.5 milliLiter(s) IntraMuscular once  lacosamide Solution 200 milliGRAM(s) Oral two times a day  lamoTRIgine 50 milliGRAM(s) Oral two times a day  lanolin Ointment 1 Application(s) Topical daily  levETIRAcetam  IVPB 750 milliGRAM(s) IV Intermittent every 12 hours  lurasidone 20 milliGRAM(s) Oral at bedtime  metoprolol tartrate 25 milliGRAM(s) Oral every 12 hours  nystatin Powder 1 Application(s) Topical every 8 hours  pantoprazole  Injectable 40 milliGRAM(s) IV Push daily  polyethylene glycol 3350 17 Gram(s) Oral daily    MEDICATIONS  (PRN):  acetaminophen     Tablet .. 650 milliGRAM(s) Oral every 6 hours PRN Temp greater or equal to 38C (100.4F), Mild Pain (1 - 3)  aluminum hydroxide/magnesium hydroxide/simethicone Suspension 30 milliLiter(s) Oral every 4 hours PRN Dyspepsia  artificial tears (preservative free) Ophthalmic Solution 1 Drop(s) Both EYES every 6 hours PRN Dry Eyes  melatonin 3 milliGRAM(s) Oral at bedtime PRN Insomnia  ondansetron Injectable 4 milliGRAM(s) IV Push every 8 hours PRN Nausea and/or Vomiting      LABS:                        8.7    7.22  )-----------( 167      ( 20 Mar 2025 07:16 )             27.9     03-20    140  |  104  |  24[H]  ----------------------------<  116[H]  3.7   |  25  |  1.43[H]    Ca    9.4      20 Mar 2025 07:16  Phos  2.4     03-20  Mg     1.9     03-20        Urinalysis Basic - ( 20 Mar 2025 07:16 )    Color: x / Appearance: x / SG: x / pH: x  Gluc: 116 mg/dL / Ketone: x  / Bili: x / Urobili: x   Blood: x / Protein: x / Nitrite: x   Leuk Esterase: x / RBC: x / WBC x   Sq Epi: x / Non Sq Epi: x / Bacteria: x          CAPILLARY BLOOD GLUCOSE    MICROBIOLOGY:     RADIOLOGY:  [ ] Reviewed and interpreted by me

## 2025-03-21 NOTE — PROGRESS NOTE ADULT - SUBJECTIVE AND OBJECTIVE BOX
DATE OF SERVICE: 03-21-25 @ 09:36    Patient is a 69y old  Male who presents with a chief complaint of Acute on chronic hypoxemic respiratory failure (21 Mar 2025 07:31)      INTERVAL HISTORY: in no acute distress    REVIEW OF SYSTEMS:  CONSTITUTIONAL: No weakness  EYES/ENT: No visual changes;  No throat pain   NECK: No pain or stiffness  RESPIRATORY: No cough, wheezing; No shortness of breath  CARDIOVASCULAR: No chest pain or palpitations  GASTROINTESTINAL: No abdominal  pain. No nausea, vomiting, or hematemesis  GENITOURINARY: No dysuria, frequency or hematuria  NEUROLOGICAL: No stroke like symptoms  SKIN: No rashes    TELEMETRY Personally reviewed:  	  MEDICATIONS:  metoprolol tartrate 25 milliGRAM(s) Oral every 12 hours        PHYSICAL EXAM:  T(C): 36.8 (03-21-25 @ 04:50), Max: 37.2 (03-20-25 @ 17:05)  HR: 71 (03-21-25 @ 04:50) (71 - 107)  BP: 147/93 (03-21-25 @ 04:50) (131/79 - 148/89)  RR: 18 (03-21-25 @ 04:50) (18 - 20)  SpO2: 98% (03-21-25 @ 04:50) (94% - 100%)  Wt(kg): --  I&O's Summary    20 Mar 2025 07:01  -  21 Mar 2025 07:00  --------------------------------------------------------  IN: 1770 mL / OUT: 800 mL / NET: 970 mL          Appearance: In no distress	  HEENT:    PERRL, EOMI	  Cardiovascular:  S1 S2, No JVD  Respiratory: Lungs clear to auscultation	  Gastrointestinal:  Soft, Non-tender, + BS	  Vascularature:  No edema of LE  Psychiatric: Appropriate affect   Neuro: no acute focal deficits                               8.6    7.95  )-----------( 176      ( 21 Mar 2025 07:00 )             27.7     03-21    141  |  104  |  24[H]  ----------------------------<  108[H]  4.1   |  28  |  1.42[H]    Ca    9.4      21 Mar 2025 07:00  Phos  2.8     03-21  Mg     1.9     03-21          Labs personally reviewed      ASSESSMENT/PLAN: 	    69 year old male with a past medical history of hypertension, hyperlipemia VAZQUEZ (on CPAP at bedtime, NC 2 liters during the day), CKD stage 3, epilepsy, schizophrenia, developmental delay, metastatic testicular cancer (s/p orchiectomy, actively on chemotherapy, last dose of etoposide/cisplatin on 6/2024), presenting with acute on chronic hypoxemic respiratory failure, secondary to (a) COVID-19 infection, (b) superimposed pneumonia after recent influenza infection, and/or (c) fluid overload.     Problem/Plan - 1:  ·  Problem: Acute on chronic respiratory failure with hypoxia.   ·  Plan: Likely 2/2 PNA, HF  - Appreciate pulmonology.  - s/p trach 3/12  - Transferred to RCU    Problem/Plan - 2:  ·  Problem: SHAGUFTA (acute kidney injury).   ·  Plan: Has a history of CKD stage 3, Cr 2.38 at baseline.    - Nephrology following  - BUN now improved following de-escalation of diuretics    Problem/Plan - 3:  ·  Problem: Chronic heart failure with preserved ejection fraction.   ·  Plan: TTE done here 1/17/25 technically difficult, but LV systolic fxn appears nl without any regional wall motion abnormalities  - Euvolemic, repeat BNP. On 2/23 2300  - TTE 3/17 1. No obvious Left atrial thrombus; unable to evaluate left atrial appendage on TTE 2. Compared to the transthoracic echocardiogram performed on 2/23/2025, LA difficult to compare.    Problem/Plan - 4:  ·  Problem: New onset Afib RVR (on tele, confirmed with EKG 2/19)   ·  Plan:  - Cont Metoprolol 25mg BID  - Cont Eliquis 5mg BID      Problem/Plan - 5:  ·  Problem: HTN    ·  Plan: Resolved  - 3/19 slightly above target. Will cont to monitor.         TUAN Vanessa, DO [facility]  Cardiovascular Medicine  [address], [address]  Available through call or text on [employer] TE[employer]  Office: [phone]     DATE OF SERVICE: 03-21-25 @ 09:36    Patient is a 69y old  Male who presents with a chief complaint of Acute on chronic hypoxemic respiratory failure (21 Mar 2025 07:31)      INTERVAL HISTORY: in no acute distress, feels tired    REVIEW OF SYSTEMS:  CONSTITUTIONAL: No weakness  EYES/ENT: No visual changes;  No throat pain   NECK: No pain or stiffness  RESPIRATORY: No cough, wheezing; No shortness of breath  CARDIOVASCULAR: No chest pain or palpitations  GASTROINTESTINAL: No abdominal  pain. No nausea, vomiting, or hematemesis  GENITOURINARY: No dysuria, frequency or hematuria  NEUROLOGICAL: No stroke like symptoms  SKIN: No rashes  	  MEDICATIONS:  metoprolol tartrate 25 milliGRAM(s) Oral every 12 hours        PHYSICAL EXAM:  T(C): 36.8 (03-21-25 @ 04:50), Max: 37.2 (03-20-25 @ 17:05)  HR: 71 (03-21-25 @ 04:50) (71 - 107)  BP: 147/93 (03-21-25 @ 04:50) (131/79 - 148/89)  RR: 18 (03-21-25 @ 04:50) (18 - 20)  SpO2: 98% (03-21-25 @ 04:50) (94% - 100%)  Wt(kg): --  I&O's Summary    20 Mar 2025 07:01  -  21 Mar 2025 07:00  --------------------------------------------------------  IN: 1770 mL / OUT: 800 mL / NET: 970 mL          Appearance: In no distress	  HEENT:    PERRL, EOMI	  Cardiovascular:  S1 S2, No JVD  Respiratory: Lungs clear to auscultation	  Gastrointestinal:  Soft, Non-tender, + BS	  Vascularature:  No edema of LE  Psychiatric: Appropriate affect   Neuro: no acute focal deficits                               8.6    7.95  )-----------( 176      ( 21 Mar 2025 07:00 )             27.7     03-21    141  |  104  |  24[H]  ----------------------------<  108[H]  4.1   |  28  |  1.42[H]    Ca    9.4      21 Mar 2025 07:00  Phos  2.8     03-21  Mg     1.9     03-21          Labs personally reviewed      ASSESSMENT/PLAN: 	    69 year old male with a past medical history of hypertension, hyperlipemia VAZQUEZ (on CPAP at bedtime, NC 2 liters during the day), CKD stage 3, epilepsy, schizophrenia, developmental delay, metastatic testicular cancer (s/p orchiectomy, actively on chemotherapy, last dose of etoposide/cisplatin on 6/2024), presenting with acute on chronic hypoxemic respiratory failure, secondary to (a) COVID-19 infection, (b) superimposed pneumonia after recent influenza infection, and/or (c) fluid overload.     Problem/Plan - 1:  ·  Problem: Acute on chronic respiratory failure with hypoxia.   ·  Plan: Likely 2/2 PNA, HF  - Appreciate pulmonology.  - s/p trach 3/12  - Transferred to RCU    Problem/Plan - 2:  ·  Problem: SHAGUFTA (acute kidney injury).   ·  Plan: Has a history of CKD stage 3, Cr 2.38 at baseline.    - Nephrology following  - BUN now improved following de-escalation of diuretics    Problem/Plan - 3:  ·  Problem: Chronic heart failure with preserved ejection fraction.   ·  Plan: TTE done here 1/17/25 technically difficult, but LV systolic fxn appears nl without any regional wall motion abnormalities  - Euvolemic, repeat BNP. On 2/23 2300  - TTE 3/17 1. No obvious Left atrial thrombus; unable to evaluate left atrial appendage on TTE 2. Compared to the transthoracic echocardiogram performed on 2/23/2025, LA difficult to compare.    Problem/Plan - 4:  ·  Problem: New onset Afib RVR (on tele, confirmed with EKG 2/19)   ·  Plan:  - Cont Metoprolol 25mg BID  - Cont Eliquis 5mg BID      Problem/Plan - 5:  ·  Problem: HTN    ·  Plan: Resolved  - 3/19 slightly above target. Will cont to monitor.         TUAN Vanessa, DO [facility]  Cardiovascular Medicine  [address], [address]  Available through call or text on [employer] TEAM[employer]  Office: [phone]

## 2025-03-21 NOTE — PROGRESS NOTE ADULT - PROBLEM SELECTOR PLAN 10
- Full Code  - Contact: Sister Ester [phone]  - PMR follow up on Monday to determine ROCHELLE vs Acute Rehab upon discharge

## 2025-03-21 NOTE — PROGRESS NOTE ADULT - PROBLEM SELECTOR PLAN 1
- Primarily caused by Covid, superimposed by bacterial pneumonia  - Intubated 02/23, extubated 03/04  - Patient with worsening secretions and consolidations on 3/8, requiring re-intubation   - S/p tracheostomy on 3/12 by MICU  - Tolerating TC ATC since 3/16; FIO2 ( 28%)  - Tolerating PMV Trials   - Trach downsize to a 7 Cuffless Portex 3/20  - BAL culture 3/8: Pseudomonas  - Txd with Levaquin renally dosed 750 q48h (3/9-3/16)

## 2025-03-21 NOTE — PROGRESS NOTE ADULT - SUBJECTIVE AND OBJECTIVE BOX
[facility] HEMATOLOGY/ONCOLOGY INPATIENT PROGRESS NOTE     Interval Hx:   03-21-25: Mr. Gr was seen at bedside today.    Meds:   MEDICATIONS  (STANDING):  albuterol/ipratropium for Nebulization 3 milliLiter(s) Nebulizer every 6 hours  apixaban 5 milliGRAM(s) Enteral Tube every 12 hours  atorvastatin 20 milliGRAM(s) Oral at bedtime  chlorhexidine 2% Cloths 1 Application(s) Topical <User Schedule>  cholecalciferol 1000 Unit(s) Oral daily  epoetin krystyna-epbx (RETACRIT) Injectable 04739 Unit(s) SubCutaneous <User Schedule>  escitalopram 15 milliGRAM(s) Oral with breakfast  gabapentin Solution 150 milliGRAM(s) Oral every 12 hours  influenza  Vaccine (HIGH DOSE) 0.5 milliLiter(s) IntraMuscular once  lacosamide Solution 200 milliGRAM(s) Oral two times a day  lamoTRIgine 50 milliGRAM(s) Oral two times a day  lanolin Ointment 1 Application(s) Topical daily  levETIRAcetam  IVPB 750 milliGRAM(s) IV Intermittent every 12 hours  lurasidone 20 milliGRAM(s) Oral at bedtime  metoprolol tartrate 25 milliGRAM(s) Oral every 12 hours  nystatin Powder 1 Application(s) Topical every 8 hours  pantoprazole  Injectable 40 milliGRAM(s) IV Push daily  polyethylene glycol 3350 17 Gram(s) Oral daily  potassium phosphate / sodium phosphate Tablet (K-PHOS No. 2) 1 Tablet(s) Oral every 8 hours    MEDICATIONS  (PRN):  acetaminophen     Tablet .. 650 milliGRAM(s) Oral every 6 hours PRN Temp greater or equal to 38C (100.4F), Mild Pain (1 - 3)  aluminum hydroxide/magnesium hydroxide/simethicone Suspension 30 milliLiter(s) Oral every 4 hours PRN Dyspepsia  artificial tears (preservative free) Ophthalmic Solution 1 Drop(s) Both EYES every 6 hours PRN Dry Eyes  melatonin 3 milliGRAM(s) Oral at bedtime PRN Insomnia  ondansetron Injectable 4 milliGRAM(s) IV Push every 8 hours PRN Nausea and/or Vomiting    Vital Signs Last 24 Hrs  T(C): 36.6 (20 Mar 2025 23:50), Max: 37.2 (20 Mar 2025 17:05)  T(F): 97.8 (20 Mar 2025 23:50), Max: 99 (20 Mar 2025 17:05)  HR: 81 (21 Mar 2025 03:18) (71 - 107)  BP: 131/79 (20 Mar 2025 23:50) (131/79 - 148/89)  BP(mean): --  RR: 20 (21 Mar 2025 03:18) (18 - 20)  SpO2: 98% (21 Mar 2025 03:18) (94% - 100%)    Parameters below as of 21 Mar 2025 03:18  Patient On (Oxygen Delivery Method): tracheostomy collar  O2 Flow (L/min): 10  O2 Concentration (%): 28    Physical Exam:  Gen: NAD, tracheostomy with ETT  HEENT: EOMI, MMM  Chest: equal chest rise  Cardiac: regular   Abd: non distended    Labs:                        8.7    7.22  )-----------( 167      ( 20 Mar 2025 07:16 )             27.9     CBC Full  -  ( 20 Mar 2025 07:16 )  WBC Count : 7.22 K/uL  RBC Count : 2.79 M/uL  Hemoglobin : 8.7 g/dL  Hematocrit : 27.9 %  Platelet Count - Automated : 167 K/uL  Mean Cell Volume : 100.0 fl  Mean Cell Hemoglobin : 31.2 pg  Mean Cell Hemoglobin Concentration : 31.2 g/dL    03-20    140  |  104  |  24[H]  ----------------------------<  116[H]  3.7   |  25  |  1.43[H]    Ca    9.4      20 Mar 2025 07:16  Phos  2.4     03-20  Mg     1.9     03-20         [facility] HEMATOLOGY/ONCOLOGY INPATIENT PROGRESS NOTE     Interval Hx:   03-21-25: Mr. Gr was seen at bedside today, without significant overnight events noted. Discussed with overnight team, without evidence of bleeding, fever. Had a BM overnight, normal. Without increasing oxygen requirements, Without seizure like activity given hx of malignancy and cerebellar finding previously. Continues on Eliquis. Counts noted and reviewed, anemia, labile thrombocytopenia     Meds:   MEDICATIONS  (STANDING):  albuterol/ipratropium for Nebulization 3 milliLiter(s) Nebulizer every 6 hours  apixaban 5 milliGRAM(s) Enteral Tube every 12 hours  atorvastatin 20 milliGRAM(s) Oral at bedtime  chlorhexidine 2% Cloths 1 Application(s) Topical <User Schedule>  cholecalciferol 1000 Unit(s) Oral daily  epoetin krystyna-epbx (RETACRIT) Injectable 86134 Unit(s) SubCutaneous <User Schedule>  escitalopram 15 milliGRAM(s) Oral with breakfast  gabapentin Solution 150 milliGRAM(s) Oral every 12 hours  influenza  Vaccine (HIGH DOSE) 0.5 milliLiter(s) IntraMuscular once  lacosamide Solution 200 milliGRAM(s) Oral two times a day  lamoTRIgine 50 milliGRAM(s) Oral two times a day  lanolin Ointment 1 Application(s) Topical daily  levETIRAcetam  IVPB 750 milliGRAM(s) IV Intermittent every 12 hours  lurasidone 20 milliGRAM(s) Oral at bedtime  metoprolol tartrate 25 milliGRAM(s) Oral every 12 hours  nystatin Powder 1 Application(s) Topical every 8 hours  pantoprazole  Injectable 40 milliGRAM(s) IV Push daily  polyethylene glycol 3350 17 Gram(s) Oral daily  potassium phosphate / sodium phosphate Tablet (K-PHOS No. 2) 1 Tablet(s) Oral every 8 hours    MEDICATIONS  (PRN):  acetaminophen     Tablet .. 650 milliGRAM(s) Oral every 6 hours PRN Temp greater or equal to 38C (100.4F), Mild Pain (1 - 3)  aluminum hydroxide/magnesium hydroxide/simethicone Suspension 30 milliLiter(s) Oral every 4 hours PRN Dyspepsia  artificial tears (preservative free) Ophthalmic Solution 1 Drop(s) Both EYES every 6 hours PRN Dry Eyes  melatonin 3 milliGRAM(s) Oral at bedtime PRN Insomnia  ondansetron Injectable 4 milliGRAM(s) IV Push every 8 hours PRN Nausea and/or Vomiting    Vital Signs Last 24 Hrs  T(C): 36.6 (20 Mar 2025 23:50), Max: 37.2 (20 Mar 2025 17:05)  T(F): 97.8 (20 Mar 2025 23:50), Max: 99 (20 Mar 2025 17:05)  HR: 81 (21 Mar 2025 03:18) (71 - 107)  BP: 131/79 (20 Mar 2025 23:50) (131/79 - 148/89)  BP(mean): --  RR: 20 (21 Mar 2025 03:18) (18 - 20)  SpO2: 98% (21 Mar 2025 03:18) (94% - 100%)    Parameters below as of 21 Mar 2025 03:18  Patient On (Oxygen Delivery Method): tracheostomy collar  O2 Flow (L/min): 10  O2 Concentration (%): 28    Physical Exam:  Gen: NAD, tracheostomy with ETT  HEENT: EOMI, MMM  Chest: equal chest rise  Cardiac: regular   Abd: non distended    Labs:                        8.7    7.22  )-----------( 167      ( 20 Mar 2025 07:16 )             27.9     CBC Full  -  ( 20 Mar 2025 07:16 )  WBC Count : 7.22 K/uL  RBC Count : 2.79 M/uL  Hemoglobin : 8.7 g/dL  Hematocrit : 27.9 %  Platelet Count - Automated : 167 K/uL  Mean Cell Volume : 100.0 fl  Mean Cell Hemoglobin : 31.2 pg  Mean Cell Hemoglobin Concentration : 31.2 g/dL    03-20    140  |  104  |  24[H]  ----------------------------<  116[H]  3.7   |  25  |  1.43[H]    Ca    9.4      20 Mar 2025 07:16  Phos  2.4     03-20  Mg     1.9     03-20

## 2025-03-21 NOTE — PROGRESS NOTE ADULT - PROBLEM SELECTOR PLAN 8
- SHAGUFTA on CKD stage 3 suspected in setting of new infection Covid 19   - CR peaked to 4.34 on 3/3, now improving   - Renvela d/cd Hyperphosphatemia resolved   - Continue Retacrit [id number] U tiw sq

## 2025-03-21 NOTE — PROGRESS NOTE ADULT - PROBLEM SELECTOR PLAN 4
- New onset Afib with RVR, EKG 2/19  - Heparin drip stopped initially due to H/H instability while in MICU   - TTE repeated on 3/17: No obvious Left atrial Thrombus   - Cont Lopressor 25 mg q 12 hrs for rate control   - Initiated Eliquis 5 mg q 12 hrs this admission ( Cleared by Heme)  - Monitor BP / HR

## 2025-03-21 NOTE — PROGRESS NOTE ADULT - ASSESSMENT
Mr. Gr is a 69 year old male with PMHx of seizure disorder, sleep apnea, HTN, chronic idiopathic constipation, stage III CKD, severe obesity, secondary hyperparathyroidism, anemia, thrombocytopenia, hyperlipidemia, testicular cancer under treatment with Dr. Ovalles who is admitted for acute hypoxic respiratory failure secondary to COVID vs superimposed pneumonia with new onset thrombocytopenia. He was recently discharged 02/06/2025 after a tenous stay including intubation in the ICU for respiratory failure in setting of seizure. He had recovered and was discharged to rehab.      Former smoker, quit 14y prior, denied ETOH, drug use. Lives at home. Does not have children. Denies family history of blood disorders or malignancy.  Denies previous workplace related exposures.  Denies previous history of blood transfusions.  Denied history of thromboses.  Denied history of  requiring anticoagulation.     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  Margins were negative.  pT3Nx. CT C/A/P with few left para-aortic and left iliac chain lymph nodes largest measuring 8.1 cm left para-aortic node, bilateral subcentimeter noncalcified pulmonary nodules measuring up to 7 mm. AFP, LDH, hCG were all elevated. 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 Started on adjuvant therapy with Cisplatin+Etoposide, so far completed 4 cycles of treatment with cisplatin dose reduced 50% and Etoposide 50% due to thrombocytopenia.      02/19/2025: on HFNC, noted tachycardia and fever, Klebsiella in urine as well, thrombocytopenia stable/improving, no signs of active bleeding     02/20/2025: developed A.fib with RVR and was placed on heparin drip and pending cardiology eval    02/21/2025: awake, on AVAPS overnight, noted RRT for hypoxia as pt removed HFNC at some point in time, good response thereafter     02/22/2025:  continues on AVAPS this morning, noted RRT overnight for hypoxia, hypercapnia, ICU following, US LE negative for DVT, counts overall stable/improved     02/23/2025: progressive respiratory failure, noted ongoing RTT this morning with pending intubation and transfer to MICU     02/24/2025: intubated 02/23/2025, sedated, on pressor support, no signs of bleeding, counts noted, no signs of bleeding     02/25/2025: continues in MICU, intubated and sedated, no signs of bleeding    02/26/2025: continues in MICU, intubated, without signs of bleeding, continues on heparin drip     02/27/2025:  remains under the care of the ICU, intubated, no signs of bleeding and continues on heparin drip, counts stable, has not required PRBC support this admission    02/28/2025: continues under the care of MICU, continues intubated, no signs of bleeding, on heparin drip    03/01/2025: continues in MICU, intubated, direct josefina IgG positive, eluate negative, not likely to be clinically significant     03/02/2025:  continues in MICU, nursing staff at bedside, no overnight events noted. CTH without acute intracranial pathology     03/03/2025: continues in MICU, intubated, on heparin drip, 1u PRBC overnight, no signs of bleeding, platelet count stable     03/04/2025: awake, moving arms spontaneously on exam, continues without much interaction however. No signs of bleeding, continues heparin drip, continues intubated in MICU     03/05/2025: extubated 03/04/2025, continues in MICU, awake and alert this morning and able to speak full sentences, discussed/reviewed findings, continues on heparin drip     03/06/2025: nursing staff at bedside, bipap overnight, without signs of bleeding, counts noted stable, renal function improving     03/07/2025:  no signs of bleeding, counts noted, continues heparin drip, continues in MICU    03/08/2025: on HFNC, no signs of bleeding, although alert and answering questions, not back to his baseline yet, continues in MICU    03/09/2025: continues in MICU, s/p re-intubation 03/08/2025 given respiratory compromise in the setting of copious secretions as evidenced by bronchoscopy, in setting of fever, now sedated, mild crusting of blood around mouth, without active sign of bleeding, counts noted reviewed, continues on heparin drip     03/10/2025: continues in MICU, no signs of bleeding, discussed with nursing staff at bedside, receiving 1u PRBC due to H/H downtrend, sputum culture with Pseudomonas, ICU and ID recs noted, continues to be intubated     03/11/2025: continues in MICU, intubated and sedated, noted counts, without signs of bleeding, appropriate response to transfusion    03/12/2025: continues in MICU, intubated and sedated, without signs of bleeding     03/13/2025: continues in MICU, s/p Tracheostomy 03/12/2025 with tracheal intubation, continues with sedation     03/14/2025:  awake and alert, mental status much improved, transferred from MICU to [address] [place] [address] 03/13/2025. Without significant events overnight, discussed with nursing staff at bedside, stated pt did well overnight, no signs of bleeding, no fever noted, medications provided via NGT and he is pending PEG-Tube placement today. His case is complex, noted with repeat need for intubations, discussed with pt, he is aware. Discussed with pts primary Oncologist as well.    03/15/2025: awake and alert, no overnight events noted, discussed with nursing staff, he did well overnight, no fevers, no signs of bleeding endorsed. He nods to information. Counts reviewed overall stable, discussed with him as well. Had PEG placed 03/14/2025 03/16/2025: no overnight events noted, comfortable overnight, nursing staff at bedside, stated pt did well, no signs of bleeding, had a BM. No fever reported. Counts reviewed, stable at this time     03/17/2025: no overnight events noted however continues with need for suctioning due to increase secretions, discussed with overnight staff, mild bleeding likely due to trauma from suctioning and recent tracheostomy placement without evidence of active bleeding otherwise. No fevers noted overnight. Had multiple smaller BMs, without evidence of bleeding, Nephrology recs and reviewed noted for Epo    03/18/2025: without significant overnight events, discussed with team at bedside, no evidence of bleeding, noted cardiology recs to start Eliquis 5mg BID for A.fib, currently on Lovenox, previously did require PRBC this admission but has since been stable, renal function seems to be improving, and Plt count improved, ok for AC per cardiology recs with Eliquis, monitor H/H closely     03/19/2025: comfortable, no overnight events noted, discussed with team, had 1 BM overnight without evidence of bleeding, now on Eliquis 5mg BID per cardiology recs for A.fib, renal function stable. Oxygen requirements stable overnight. No fevers reported. Pts primary Oncologist, Dr. Ovalles, aware, case discussed     03/20/2025: without significant events noted, continues on Eliquis, therapeutic, without signs of bleeding, continues on Epo, counts and imaging reviewed, no overnight events noted, continues with tracheostomy without increased oxygen requirements, increased secretions noted       #Acute hypoxic respiratory failure, Acute, Severe   # COVID  - CT noted with bilateral GGO  - ID following  - Pulmonary following  - Antibiotics per primary team/MICU and ID   - Intubated 02/23/2025 AM, MICU   - extubated 03/04/2025  - Pseudomonas from sputum culture   - Re-intubated 03/09/2025 due to increased work of breathing in setting of increased secretions, not protecting airway, tachypnea, hypoxia   - s/p Tracheostomy 03/12/2025    # Thrombocytopenia, Acute, Moderate   - Did occur during most recent admission and improved to normal prior to discharge   - Without signs of bleeding  - Could be multifactorial, possibly due to infection   - Continue to trend  - Transfuse to maintain Plt > 10k unless actively bleeding then maintain > 50k     # Brain lesion Hx of cancer, Acute, Severe   - pt with hx of seizures  - MRI brain now with 5.4 mm enhancing lesion in the LEFT middle cerebellar peduncle with associated edema suspicious for metastases  - MRI Lumbar negative for acute disease  - Small lesion without mass effect or edema, recommended to correlate per neurology if foci and locus are concordant with seizure activity  - Neurology recs noted, new lesion not likely to cause seizure  - It is rare, but nonetheless not impossible, for testicular cancer to be metastatic to the brain  - He will need another PET-CT, can be completed outpatient once stable if concern can proceed with biopsy at that time   - Previous endocrinology eval for thyroid nodule noted  - AFP/[facility] normal,  previously   - Repeat CTH without acute intracranial pathology     # 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 Ovalles, agree with current plan  - Follow up as outpatient with Franki Ovalles MD     # Acute kidney injury on CKD Stage IIIA, Acute, Moderate   - Likely multifactorial  - Nephrology consult and recs noted  - Now on therapeutic Eliquis, will need to be monitoring in high risk setting for bleeding   - Continue to trend     # Normocytic anemia, Acute, Severe   - Chronic, has hx of PRBC during chemo 07/2024  - Noted Anti E, Anti-K Anti-C antibodies previously  - direct josefina IgG positive, eluate negative, not likely to be clinically significant   - s/p 2u PRBC 03/03/2025 and 03/10/2025   - Monitor for bleeding  - Epo ordered  - Recommend to transfuse to maintain Hb > 7    # Klebsiella UTI, Acute, Severe > Moderate   # Pseudomonas UTI, Acute, Moderate   -Antibiotics per ID and primary team       Recommendations  - Trend CBC daily, obtain post transfusion CBC today   - Transfuse to maintain Hb > 7   - Transfuse to maintain Plt >10k unless active bleeding then >50k   - Cardiologyy recs noted, now on Eliquis 5mg BID for A.fib, monitor H/H         Thank you for allowing me to participate in the care of Mr. Gr please do not hesitate to call or text me if you have further questions or concerns.     Brayan Mart MD  [facility] [place]   Division of Hematology/Oncology  [facility], [address]  [place] [address]  P: [phone]  F: [phone]    Attestation:    ----Pt evaluated, >50min spent including face-to-face interaction in addition to chart review, reviewing treatment plan, discussing with care staff in hospital, his outside physician, and managing the patient’s chronic diagnoses as listed in the assessment----        Mr. Gr is a 69 year old male with PMHx of seizure disorder, sleep apnea, HTN, chronic idiopathic constipation, stage III CKD, severe obesity, secondary hyperparathyroidism, anemia, thrombocytopenia, hyperlipidemia, testicular cancer under treatment with Dr. Ovalles who is admitted for acute hypoxic respiratory failure secondary to COVID vs superimposed pneumonia with new onset thrombocytopenia. He was recently discharged 02/06/2025 after a tenous stay including intubation in the ICU for respiratory failure in setting of seizure. He had recovered and was discharged to rehab.      Former smoker, quit 14y prior, denied ETOH, drug use. Lives at home. Does not have children. Denies family history of blood disorders or malignancy.  Denies previous workplace related exposures.  Denies previous history of blood transfusions.  Denied history of thromboses.  Denied history of  requiring anticoagulation.     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  Margins were negative.  pT3Nx. CT C/A/P with few left para-aortic and left iliac chain lymph nodes largest measuring 8.1 cm left para-aortic node, bilateral subcentimeter noncalcified pulmonary nodules measuring up to 7 mm. AFP, LDH, hCG were all elevated. 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 Started on adjuvant therapy with Cisplatin+Etoposide, so far completed 4 cycles of treatment with cisplatin dose reduced 50% and Etoposide 50% due to thrombocytopenia.      02/19/2025: on HFNC, noted tachycardia and fever, Klebsiella in urine as well, thrombocytopenia stable/improving, no signs of active bleeding     02/20/2025: developed A.fib with RVR and was placed on heparin drip and pending cardiology eval    02/21/2025: awake, on AVAPS overnight, noted RRT for hypoxia as pt removed HFNC at some point in time, good response thereafter     02/22/2025:  continues on AVAPS this morning, noted RRT overnight for hypoxia, hypercapnia, ICU following, US LE negative for DVT, counts overall stable/improved     02/23/2025: progressive respiratory failure, noted ongoing RTT this morning with pending intubation and transfer to MICU     02/24/2025: intubated 02/23/2025, sedated, on pressor support, no signs of bleeding, counts noted, no signs of bleeding     02/25/2025: continues in MICU, intubated and sedated, no signs of bleeding    02/26/2025: continues in MICU, intubated, without signs of bleeding, continues on heparin drip     02/27/2025:  remains under the care of the ICU, intubated, no signs of bleeding and continues on heparin drip, counts stable, has not required PRBC support this admission    02/28/2025: continues under the care of MICU, continues intubated, no signs of bleeding, on heparin drip    03/01/2025: continues in MICU, intubated, direct josefina IgG positive, eluate negative, not likely to be clinically significant     03/02/2025:  continues in MICU, nursing staff at bedside, no overnight events noted. CTH without acute intracranial pathology     03/03/2025: continues in MICU, intubated, on heparin drip, 1u PRBC overnight, no signs of bleeding, platelet count stable     03/04/2025: awake, moving arms spontaneously on exam, continues without much interaction however. No signs of bleeding, continues heparin drip, continues intubated in MICU     03/05/2025: extubated 03/04/2025, continues in MICU, awake and alert this morning and able to speak full sentences, discussed/reviewed findings, continues on heparin drip     03/06/2025: nursing staff at bedside, bipap overnight, without signs of bleeding, counts noted stable, renal function improving     03/07/2025:  no signs of bleeding, counts noted, continues heparin drip, continues in MICU    03/08/2025: on HFNC, no signs of bleeding, although alert and answering questions, not back to his baseline yet, continues in MICU    03/09/2025: continues in MICU, s/p re-intubation 03/08/2025 given respiratory compromise in the setting of copious secretions as evidenced by bronchoscopy, in setting of fever, now sedated, mild crusting of blood around mouth, without active sign of bleeding, counts noted reviewed, continues on heparin drip     03/10/2025: continues in MICU, no signs of bleeding, discussed with nursing staff at bedside, receiving 1u PRBC due to H/H downtrend, sputum culture with Pseudomonas, ICU and ID recs noted, continues to be intubated     03/11/2025: continues in MICU, intubated and sedated, noted counts, without signs of bleeding, appropriate response to transfusion    03/12/2025: continues in MICU, intubated and sedated, without signs of bleeding     03/13/2025: continues in MICU, s/p Tracheostomy 03/12/2025 with tracheal intubation, continues with sedation     03/14/2025:  awake and alert, mental status much improved, transferred from MICU to [address] [place] [address] 03/13/2025. Without significant events overnight, discussed with nursing staff at bedside, stated pt did well overnight, no signs of bleeding, no fever noted, medications provided via NGT and he is pending PEG-Tube placement today. His case is complex, noted with repeat need for intubations, discussed with pt, he is aware. Discussed with pts primary Oncologist as well.    03/15/2025: awake and alert, no overnight events noted, discussed with nursing staff, he did well overnight, no fevers, no signs of bleeding endorsed. He nods to information. Counts reviewed overall stable, discussed with him as well. Had PEG placed 03/14/2025 03/16/2025: no overnight events noted, comfortable overnight, nursing staff at bedside, stated pt did well, no signs of bleeding, had a BM. No fever reported. Counts reviewed, stable at this time     03/17/2025: no overnight events noted however continues with need for suctioning due to increase secretions, discussed with overnight staff, mild bleeding likely due to trauma from suctioning and recent tracheostomy placement without evidence of active bleeding otherwise. No fevers noted overnight. Had multiple smaller BMs, without evidence of bleeding, Nephrology recs and reviewed noted for Epo    03/18/2025: without significant overnight events, discussed with team at bedside, no evidence of bleeding, noted cardiology recs to start Eliquis 5mg BID for A.fib, currently on Lovenox, previously did require PRBC this admission but has since been stable, renal function seems to be improving, and Plt count improved, ok for AC per cardiology recs with Eliquis, monitor H/H closely     03/19/2025: comfortable, no overnight events noted, discussed with team, had 1 BM overnight without evidence of bleeding, now on Eliquis 5mg BID per cardiology recs for A.fib, renal function stable. Oxygen requirements stable overnight. No fevers reported. Pts primary Oncologist, Dr. Ovalles, aware, case discussed     03/20/2025: without significant events noted, continues on Eliquis, therapeutic, without signs of bleeding, continues on Epo, counts and imaging reviewed, no overnight events noted, continues with tracheostomy without increased oxygen requirements, increased secretions noted     03/21/2025: without significant overnight events noted. Discussed with overnight team, without evidence of bleeding, fever. Had a BM overnight, normal. Without increasing oxygen requirements, Without seizure like activity given hx of malignancy and cerebellar finding previously. Continues on Eliquis. Counts noted and reviewed, anemia, labile thrombocytopenia         #Acute hypoxic respiratory failure, Acute, Severe   # COVID  - CT noted with bilateral GGO  - ID following  - Pulmonary following  - Antibiotics per primary team/MICU and ID   - Intubated 02/23/2025 AM, MICU   - extubated 03/04/2025  - Pseudomonas from sputum culture   - Re-intubated 03/09/2025 due to increased work of breathing in setting of increased secretions, not protecting airway, tachypnea, hypoxia   - s/p Tracheostomy 03/12/2025    # Thrombocytopenia, Acute, Moderate   - Did occur during most recent admission and improved to normal prior to discharge   - Without signs of bleeding  - Could be multifactorial, possibly due to infection   - Continue to trend  - Transfuse to maintain Plt > 10k unless actively bleeding then maintain > 50k     # Brain lesion Hx of cancer, Acute, Severe   - pt with hx of seizures  - MRI brain now with 5.4 mm enhancing lesion in the LEFT middle cerebellar peduncle with associated edema suspicious for metastases  - MRI Lumbar negative for acute disease  - Small lesion without mass effect or edema, recommended to correlate per neurology if foci and locus are concordant with seizure activity  - Neurology recs noted, new lesion not likely to cause seizure  - It is rare, but nonetheless not impossible, for testicular cancer to be metastatic to the brain  - He will need another PET-CT, can be completed outpatient once stable if concern can proceed with biopsy at that time   - Previous endocrinology eval for thyroid nodule noted  - AFP/[facility]CH normal,  previously   - Repeat CTH without acute intracranial pathology     # 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 Ovalles, agree with current plan  - Follow up as outpatient with Franki Ovalles MD     # Acute kidney injury on CKD Stage IIIA, Acute, Moderate   - Likely multifactorial  - Nephrology consult and recs noted  - Now on therapeutic Eliquis, will need to be monitoring in high risk setting for bleeding   - Continue to trend     # Normocytic anemia, Acute, Severe   - Chronic, has hx of PRBC during chemo 07/2024  - Noted Anti E, Anti-K Anti-C antibodies previously  - direct josefina IgG positive, eluate negative, not likely to be clinically significant   - s/p 2u PRBC 03/03/2025 and 03/10/2025   - Monitor for bleeding  - Epo ordered  - Recommend to transfuse to maintain Hb > 7    # Klebsiella UTI, Acute, Severe > Moderate   # Pseudomonas UTI, Acute, Moderate   -Antibiotics per ID and primary team       Recommendations  - Trend CBC daily, obtain post transfusion CBC today   - Transfuse to maintain Hb > 7   - Transfuse to maintain Plt >10k unless active bleeding then >50k   - Cardiologyy recs noted, now on Eliquis 5mg BID for A.fib, monitor H/H         Thank you for allowing me to participate in the care of Mr. Gr please do not hesitate to call or text me if you have further questions or concerns.     Brayan Mart MD  [facility] [place]   Division of Hematology/Oncology  [address] [place] [address], [address]  [place] [address]  P: [phone]  F: [phone]    Attestation:    ----Pt evaluated, >50min spent including face-to-face interaction in addition to chart review, reviewing treatment plan, discussing with care staff in hospital, his outside physician, and managing the patient’s chronic diagnoses as listed in the assessment----

## 2025-03-21 NOTE — PROGRESS NOTE ADULT - PROBLEM SELECTOR PLAN 3
- Continue with home meds:  - Lacosamide ( Renew 4/18), Keppra, and Lamotrigine  - Multiple EEG's negative no seizure, recently done 3/4

## 2025-03-21 NOTE — PROGRESS NOTE ADULT - NS ATTEND AMEND GEN_ALL_CORE FT
70 yo M with a h/o metastatic testicular cancer, epilepsy on AEDs, chronic respiratory failure with hypoxia and hypercapnia on home O2, VAZQUEZ on CPAP, HFpEF admitted for acute on chronic respiratory failure with hypoxia and hypercapnia likely secondary to combination of COVID PNA, bacterial superinfection, and acute pulmonary edema due to acute on chronic HFpEF. Hypotension likely severe sepsis with septic shock. SHAGUFTA likely ATN +/- venous congestion. Repeat CT chest with bilateral GGOs possibly acute pulmonary edema vs early fibrosis vs residual COVID along with L basilar consolidation likely bacterial PNA vs atelectasis. Now s/p trach and PEG.    # Acute on chronic respiratory failure with hypoxia and hypercapnia  # COVID PNA  # Bacterial PNA  # Acute pulmonary edema  # Acute on chronic HFpEF  # Hypotension  # Severe sepsis with septic shock  # SHAGUFTA  # Hypernatremia  # Epilepsy  Clinically much improved, seen sitting in a chair, interactive and phonating  - Continue AEDs  - Tolerating TC and PMV   - Trend Cr, avoid nephrotoxins  - Completed Levaquin course   - Completed course of dexamethasone and remdesivir for COVID  - c/w PEG feeds, e/o aspiration on FEES testing on 3/18  - Hypernatremia resolved  - Afib- rate controlled, tolerating Eliquis. TTE without evidence of LA thrombus, unable to assess Left atrial appendage  -OOB to chair, PT and OT follow up appreciated   - Full code

## 2025-03-21 NOTE — PROGRESS NOTE ADULT - ASSESSMENT
69 year old male with a past medical history of hypertension, hyperlipemia VAZQUEZ (on CPAP at bedtime, NC 2 liters during the day), CKD stage 3, epilepsy, schizophrenia, developmental delay, metastatic testicular cancer (s/p orchiectomy, actively on chemotherapy, last dose of etoposide/cisplatin on 6/2024) presenting with worsening shortness of breath. Patient was recently hospitalized at [facility] from January 27th 2025 to February 6th 2025 for seizure and influenza virus infection, which required intubation. He was sent to rehab (originally from home) from hospital. He returns due to sudden onset shortness of breath and worsening oxygenation. Of note, he tested positive for COVID-19       1- SHAGUFTA on CKDIII  2- covid-19  3- anemia  4- hypotension  5- respiratory failure         SHAGUFTA suspected in setting of new infection. covid 19   creatinine is improving to ckd III   bp is in range   anemia, trend hb   retacrit 67926 U tiw sq  tube feeds -> Jevity  strict I/O  trend creatinine and electroytes daily

## 2025-03-22 ENCOUNTER — TRANSCRIPTION ENCOUNTER (OUTPATIENT)
Age: 70
End: 2025-03-22

## 2025-03-22 LAB
ANION GAP SERPL CALC-SCNC: 9 MMOL/L — SIGNIFICANT CHANGE UP (ref 5–17)
BUN SERPL-MCNC: 24 MG/DL — HIGH (ref 7–23)
CALCIUM SERPL-MCNC: 9.4 MG/DL — SIGNIFICANT CHANGE UP (ref 8.4–10.5)
CHLORIDE SERPL-SCNC: 103 MMOL/L — SIGNIFICANT CHANGE UP (ref 96–108)
CO2 SERPL-SCNC: 29 MMOL/L — SIGNIFICANT CHANGE UP (ref 22–31)
CREAT SERPL-MCNC: 1.48 MG/DL — HIGH (ref 0.5–1.3)
EGFR: 52 ML/MIN/1.73M2 — LOW
EGFR: 52 ML/MIN/1.73M2 — LOW
GLUCOSE SERPL-MCNC: 121 MG/DL — HIGH (ref 70–99)
HCT VFR BLD CALC: 30 % — LOW (ref 39–50)
HGB BLD-MCNC: 9.2 G/DL — LOW (ref 13–17)
MAGNESIUM SERPL-MCNC: 1.8 MG/DL — SIGNIFICANT CHANGE UP (ref 1.6–2.6)
MCHC RBC-ENTMCNC: 30.7 G/DL — LOW (ref 32–36)
MCHC RBC-ENTMCNC: 31.1 PG — SIGNIFICANT CHANGE UP (ref 27–34)
MCV RBC AUTO: 101.4 FL — HIGH (ref 80–100)
NRBC BLD AUTO-RTO: 0 /100 WBCS — SIGNIFICANT CHANGE UP (ref 0–0)
PHOSPHATE SERPL-MCNC: 3.7 MG/DL — SIGNIFICANT CHANGE UP (ref 2.5–4.5)
PLATELET # BLD AUTO: 183 K/UL — SIGNIFICANT CHANGE UP (ref 150–400)
POTASSIUM SERPL-MCNC: 4.3 MMOL/L — SIGNIFICANT CHANGE UP (ref 3.5–5.3)
POTASSIUM SERPL-SCNC: 4.3 MMOL/L — SIGNIFICANT CHANGE UP (ref 3.5–5.3)
RBC # BLD: 2.96 M/UL — LOW (ref 4.2–5.8)
RBC # FLD: 17.4 % — HIGH (ref 10.3–14.5)
SODIUM SERPL-SCNC: 141 MMOL/L — SIGNIFICANT CHANGE UP (ref 135–145)
WBC # BLD: 7.19 K/UL — SIGNIFICANT CHANGE UP (ref 3.8–10.5)
WBC # FLD AUTO: 7.19 K/UL — SIGNIFICANT CHANGE UP (ref 3.8–10.5)

## 2025-03-22 PROCEDURE — 99232 SBSQ HOSP IP/OBS MODERATE 35: CPT

## 2025-03-22 RX ORDER — ACETAMINOPHEN 500 MG/5ML
1000 LIQUID (ML) ORAL ONCE
Refills: 0 | Status: DISCONTINUED | OUTPATIENT
Start: 2025-03-22 | End: 2025-03-26

## 2025-03-22 RX ADMIN — APIXABAN 5 MILLIGRAM(S): 2.5 TABLET, FILM COATED ORAL at 05:20

## 2025-03-22 RX ADMIN — EPOETIN ALFA 10000 UNIT(S): 10000 SOLUTION INTRAVENOUS; SUBCUTANEOUS at 12:28

## 2025-03-22 RX ADMIN — ATORVASTATIN CALCIUM 20 MILLIGRAM(S): 80 TABLET, FILM COATED ORAL at 22:04

## 2025-03-22 RX ADMIN — NYSTATIN 1 APPLICATION(S): 100000 CREAM TOPICAL at 22:04

## 2025-03-22 RX ADMIN — LAMOTRIGINE 50 MILLIGRAM(S): 150 TABLET ORAL at 05:20

## 2025-03-22 RX ADMIN — METOPROLOL SUCCINATE 25 MILLIGRAM(S): 50 TABLET, EXTENDED RELEASE ORAL at 18:00

## 2025-03-22 RX ADMIN — Medication 650 MILLIGRAM(S): at 09:46

## 2025-03-22 RX ADMIN — Medication 1000 UNIT(S): at 11:46

## 2025-03-22 RX ADMIN — LACOSAMIDE 200 MILLIGRAM(S): 150 TABLET, FILM COATED ORAL at 17:59

## 2025-03-22 RX ADMIN — Medication 40 MILLIGRAM(S): at 11:46

## 2025-03-22 RX ADMIN — IPRATROPIUM BROMIDE AND ALBUTEROL SULFATE 3 MILLILITER(S): .5; 2.5 SOLUTION RESPIRATORY (INHALATION) at 06:26

## 2025-03-22 RX ADMIN — NYSTATIN 1 APPLICATION(S): 100000 CREAM TOPICAL at 13:06

## 2025-03-22 RX ADMIN — LEVETIRACETAM 750 MILLIGRAM(S): 10 INJECTION, SOLUTION INTRAVENOUS at 17:59

## 2025-03-22 RX ADMIN — LACOSAMIDE 200 MILLIGRAM(S): 150 TABLET, FILM COATED ORAL at 05:19

## 2025-03-22 RX ADMIN — LEVETIRACETAM 750 MILLIGRAM(S): 10 INJECTION, SOLUTION INTRAVENOUS at 05:19

## 2025-03-22 RX ADMIN — APIXABAN 5 MILLIGRAM(S): 2.5 TABLET, FILM COATED ORAL at 18:00

## 2025-03-22 RX ADMIN — ESCITALOPRAM OXALATE 15 MILLIGRAM(S): 20 TABLET ORAL at 05:20

## 2025-03-22 RX ADMIN — Medication 1 APPLICATION(S): at 11:47

## 2025-03-22 RX ADMIN — GABAPENTIN 150 MILLIGRAM(S): 400 CAPSULE ORAL at 17:59

## 2025-03-22 RX ADMIN — LURASIDONE HYDROCHLORIDE 20 MILLIGRAM(S): 120 TABLET, FILM COATED ORAL at 22:04

## 2025-03-22 RX ADMIN — METOPROLOL SUCCINATE 25 MILLIGRAM(S): 50 TABLET, EXTENDED RELEASE ORAL at 05:19

## 2025-03-22 RX ADMIN — GABAPENTIN 150 MILLIGRAM(S): 400 CAPSULE ORAL at 05:19

## 2025-03-22 RX ADMIN — NYSTATIN 1 APPLICATION(S): 100000 CREAM TOPICAL at 05:20

## 2025-03-22 RX ADMIN — IPRATROPIUM BROMIDE AND ALBUTEROL SULFATE 3 MILLILITER(S): .5; 2.5 SOLUTION RESPIRATORY (INHALATION) at 11:03

## 2025-03-22 RX ADMIN — Medication 1 APPLICATION(S): at 05:33

## 2025-03-22 RX ADMIN — LAMOTRIGINE 50 MILLIGRAM(S): 150 TABLET ORAL at 18:00

## 2025-03-22 RX ADMIN — IPRATROPIUM BROMIDE AND ALBUTEROL SULFATE 3 MILLILITER(S): .5; 2.5 SOLUTION RESPIRATORY (INHALATION) at 17:23

## 2025-03-22 NOTE — PROGRESS NOTE ADULT - NS ATTEND AMEND GEN_ALL_CORE FT
68 yo M with a h/o metastatic testicular cancer, epilepsy on AEDs, chronic respiratory failure with hypoxia and hypercapnia on home O2, VAZQUEZ on CPAP, HFpEF admitted for acute on chronic respiratory failure with hypoxia and hypercapnia likely secondary to combination of COVID PNA, bacterial superinfection, and acute pulmonary edema due to acute on chronic HFpEF. Hypotension likely severe sepsis with septic shock. SHAGUFTA likely ATN +/- venous congestion. Repeat CT chest with bilateral GGOs possibly acute pulmonary edema vs early fibrosis vs residual COVID along with L basilar consolidation likely bacterial PNA vs atelectasis. Now s/p trach and PEG.    # Acute on chronic respiratory failure with hypoxia and hypercapnia  # COVID PNA  # Bacterial PNA  # Acute pulmonary edema  # Acute on chronic HFpEF  # Hypotension  # Severe sepsis with septic shock  # SHAGUFTA  # Hypernatremia  # Epilepsy  Clinically much improved, seen sitting in a chair, interactive and phonating  - Continue AEDs  - Tolerating TC and PMV   - Trend Cr, avoid nephrotoxins  - Completed Levaquin course   - Completed course of dexamethasone and remdesivir for COVID  - c/w PEG feeds, e/o aspiration on FEES testing on 3/18  - Hypernatremia resolved  - Afib- rate controlled, tolerating Eliquis. TTE without evidence of LA thrombus, unable to assess Left atrial appendage  -OOB to chair, PT and OT follow up appreciated   - Full code as above:  70 yo M with a h/o metastatic testicular cancer, epilepsy on AEDs, chronic respiratory failure with hypoxia and hypercapnia on home O2, VAZQUEZ on CPAP, HFpEF admitted for acute on chronic respiratory failure with hypoxia and hypercapnia likely secondary to combination of COVID PNA, bacterial superinfection, and acute pulmonary edema due to acute on chronic HFpEF. Hypotension likely severe sepsis with septic shock. SHAGUFTA likely ATN +/- venous congestion. Repeat CT chest with bilateral GGOs possibly acute pulmonary edema vs early fibrosis vs residual COVID along with L basilar consolidation likely bacterial PNA vs atelectasis. Now s/p trach and PEG.    # Acute on chronic respiratory failure with hypoxia and hypercapnia  # COVID PNA  # Bacterial PNA  # Acute pulmonary edema  # Acute on chronic HFpEF  # Hypotension  # Severe sepsis with septic shock  # SHAGUFTA  # Hypernatremia  # Epilepsy  Clinically much improved, interactive and phonating  - Continue AEDs  - Tolerating TC and PMV   - Trend Cr, avoid nephrotoxins  - Completed Levaquin course   - Completed course of dexamethasone and remdesivir for COVID  - c/w PEG feeds, e/o aspiration on FEES testing on 3/18--MBS coming week planned  - Hypernatremia resolved  - Afib- rate controlled, tolerating Eliquis. TTE without evidence of LA thrombus, unable to assess Left atrial appendage  -OOB to chair, PT and OT follow up appreciated   - Full code    Brandon Fowler MD-Pulmonary   [phone]

## 2025-03-22 NOTE — DISCHARGE NOTE PROVIDER - CARE PROVIDERS DIRECT ADDRESSES
,[email or web address]

## 2025-03-22 NOTE — DISCHARGE NOTE PROVIDER - ATTENDING ATTESTATION STATEMENT
Subjective:       Patient ID: Esme Louise is a 50 y.o. female.    Chief Complaint:  Annual Exam (last pap/hpv  normal, mammo  birads 1, c/o decreased sex drive)      Patient Active Problem List   Diagnosis    Pain of cervical facet joint    Facet arthropathy, cervical    Occipital neuralgia    Endometriosis    Premature ovarian failure    Essential hypertension    Hypothyroidism    Migraine without status migrainosus, not intractable    Hyperlipidemia    Rheumatoid arthritis       History of Present Illness  50 y.o. yo  here for annual exam. Reports decreased libido is her biggest complaint. Wife, Rachael, is my patient. Patient was dx with POF years ago. Hot flashes are not her primary concern, more so the libidio. Discussed in detail. rec check some hormone levels and consider testosterone gel +/- estradiol. Pt agrees. Will do labs today. Also worried about her bones because of POF> will order DEXA    Past Medical History:   Diagnosis Date    Anemia     Anxiety     Arthritis     Arthritis     Breast disorder     Depression     Endometriosis     Headaches, cluster     Hypertension     Mental disorder     Occipital neuralgia     Premature ovarian failure     Thyroid disease        Past Surgical History:   Procedure Laterality Date    BREAST LUMPECTOMY Right     benign     ENDOMETRIAL ABLATION      endometriosis     OCCIPITAL NERVE STIMULATOR INSERTION  2015    OOPHORECTOMY Right     endometriosis        OB History    Para Term  AB Living   0 0 0 0 0 0   SAB TAB Ectopic Multiple Live Births   0 0 0 0     Obstetric Comments   Menarche at 18       Patient's last menstrual period was 2018 (approximate).   Date of Last Pap: 2016    Review of Systems  Review of Systems   Constitutional: Negative for fatigue and unexpected weight change.   Respiratory: Negative for shortness of breath.    Cardiovascular: Negative for chest pain.   Gastrointestinal: Negative  for abdominal pain, constipation, diarrhea, nausea and vomiting.   Genitourinary: Negative for dysuria.   Musculoskeletal: Negative for back pain.   Skin: Negative for rash.   Neurological: Negative for headaches.   Hematological: Does not bruise/bleed easily.   Psychiatric/Behavioral: Negative for behavioral problems.        Objective:   Physical Exam:   Constitutional: She is oriented to person, place, and time. She appears well-developed and well-nourished. No distress.        Pulmonary/Chest: She exhibits no mass. Right breast exhibits no mass, no nipple discharge, no skin change, no tenderness, no bleeding and no swelling. Left breast exhibits no mass, no nipple discharge, no skin change, no tenderness, no bleeding and no swelling. Breasts are symmetrical.        Abdominal: Soft. Normal appearance and bowel sounds are normal. She exhibits no distension and no mass. There is no abdominal tenderness. There is no rebound.     Genitourinary:    Vagina and uterus normal.   There is no rash, tenderness, lesion or injury on the right labia. There is no rash, tenderness, lesion or injury on the left labia. Uterus is not deviated, not enlarged, not fixed, not tender, not hosting fibroids and not experiencing uterine prolapse. Cervix is normal. Right adnexum displays no mass, no tenderness and no fullness. Left adnexum displays no mass, no tenderness and no fullness. No erythema, tenderness, rectocele, cystocele or unspecified prolapse of vaginal walls in the vagina. Cervix exhibits no motion tenderness, no discharge and no friability. negative for vaginal discharge          Musculoskeletal: Normal range of motion and moves all extremeties.      Lymphadenopathy:        Right: No supraclavicular adenopathy present.        Left: No supraclavicular adenopathy present.    Neurological: She is alert and oriented to person, place, and time.    Skin: Skin is warm and dry.    Psychiatric: She has a normal mood and affect. Her  behavior is normal. Judgment normal.        Assessment/ Plan:     1. Encounter for screening for human papillomavirus (HPV)  HPV High Risk Genotypes, PCR   2. Encounter for Papanicolaou smear for cervical cancer screening  Liquid-Based Pap Smear, Screening   3. Menopause  Estradiol    Follicle stimulating hormone    TSH    Testosterone    DXA Bone Density Spine And Hip       Follow-up with me in 1 year     I have personally seen and examined the patient. I have collaborated with and supervised the

## 2025-03-22 NOTE — DISCHARGE NOTE PROVIDER - NSDCFUADDINST_GEN_ALL_CORE_FT
Local Wound Care: Occiput: continue to monitor, apply cavilon daily, continue off-loading the head with the maddy-form positioner, Continue with T&P  Continue with boots, seat cushion when oob to chair

## 2025-03-22 NOTE — PROGRESS NOTE ADULT - SUBJECTIVE AND OBJECTIVE BOX
[facility] HEMAT[facility]Y/ONCOLOGY INPATIENT PROGRESS NOTE     Interval Hx:   03-22-25: Mr. Gr was seen at bedside today.    Meds:   MEDICATIONS  (STANDING):  albuterol/ipratropium for Nebulization 3 milliLiter(s) Nebulizer every 6 hours  apixaban 5 milliGRAM(s) Enteral Tube every 12 hours  atorvastatin 20 milliGRAM(s) Oral at bedtime  chlorhexidine 2% Cloths 1 Application(s) Topical <User Schedule>  cholecalciferol 1000 Unit(s) Oral daily  epoetin krystyna-epbx (RETACRIT) Injectable 33714 Unit(s) SubCutaneous <User Schedule>  escitalopram 15 milliGRAM(s) Oral with breakfast  gabapentin Solution 150 milliGRAM(s) Oral every 12 hours  influenza  Vaccine (HIGH DOSE) 0.5 milliLiter(s) IntraMuscular once  lacosamide Solution 200 milliGRAM(s) Oral two times a day  lamoTRIgine 50 milliGRAM(s) Oral two times a day  lanolin Ointment 1 Application(s) Topical daily  levETIRAcetam  Solution 750 milliGRAM(s) Oral two times a day  lurasidone 20 milliGRAM(s) Oral at bedtime  metoprolol tartrate 25 milliGRAM(s) Oral every 12 hours  nystatin Powder 1 Application(s) Topical every 8 hours  pantoprazole   Suspension 40 milliGRAM(s) Oral daily  polyethylene glycol 3350 17 Gram(s) Oral daily    MEDICATIONS  (PRN):  acetaminophen     Tablet .. 650 milliGRAM(s) Oral every 6 hours PRN Temp greater or equal to 38C (100.4F), Mild Pain (1 - 3)  aluminum hydroxide/magnesium hydroxide/simethicone Suspension 30 milliLiter(s) Oral every 4 hours PRN Dyspepsia  artificial tears (preservative free) Ophthalmic Solution 1 Drop(s) Both EYES every 6 hours PRN Dry Eyes  melatonin 3 milliGRAM(s) Oral at bedtime PRN Insomnia  ondansetron Injectable 4 milliGRAM(s) IV Push every 8 hours PRN Nausea and/or Vomiting    Vital Signs Last 24 Hrs  T(C): 36.5 (22 Mar 2025 04:30), Max: 36.9 (21 Mar 2025 17:54)  T(F): 97.7 (22 Mar 2025 04:30), Max: 98.5 (21 Mar 2025 17:54)  HR: 88 (22 Mar 2025 04:30) (69 - 112)  BP: 168/93 (22 Mar 2025 04:30) (145/87 - 168/93)  BP(mean): 106 (21 Mar 2025 14:30) (106 - 106)  RR: 20 (22 Mar 2025 04:30) (16 - 20)  SpO2: 99% (22 Mar 2025 04:30) (90% - 100%)    Parameters below as of 22 Mar 2025 04:30  Patient On (Oxygen Delivery Method): tracheostomy collar    Physical Exam:  Gen: NAD, tracheostomy with ETT  HEENT: EOMI, MMM  Chest: equal chest rise  Cardiac: regular   Abd: non distended    Labs:                        8.6    7.95  )-----------( 176      ( 21 Mar 2025 07:00 )             27.7     CBC Full  -  ( 21 Mar 2025 07:00 )  WBC Count : 7.95 K/uL  RBC Count : 2.80 M/uL  Hemoglobin : 8.6 g/dL  Hematocrit : 27.7 %  Platelet Count - Automated : 176 K/uL  Mean Cell Volume : 98.9 fl  Mean Cell Hemoglobin : 30.7 pg  Mean Cell Hemoglobin Concentration : 31.0 g/dL    03-21    141  |  104  |  24[H]  ----------------------------<  108[H]  4.1   |  28  |  1.42[H]    Ca    9.4      21 Mar 2025 07:00  Phos  2.8     03-21  Mg     1.9     03-21         [facility]EMATOLOGY/ON[facility]OGY INPATIENT PROGRESS NOTE     Interval Hx:   03-22-25: Mr. Gr was seen at bedside today, no overnight events noted, discussed with overnight staff, pt did well, without increased oxygen requirements, without signs of bleeding. Had one BM overnight, without bleeding. Continues on Eliquis, Renal function is stable and appropriate. Counts and labs reviewed, overall stable however anemia persists.     Meds:   MEDICATIONS  (STANDING):  albuterol/ipratropium for Nebulization 3 milliLiter(s) Nebulizer every 6 hours  apixaban 5 milliGRAM(s) Enteral Tube every 12 hours  atorvastatin 20 milliGRAM(s) Oral at bedtime  chlorhexidine 2% Cloths 1 Application(s) Topical <User Schedule>  cholecalciferol 1000 Unit(s) Oral daily  epoetin krystyna-epbx (RETACRIT) Injectable 58881 Unit(s) SubCutaneous <User Schedule>  escitalopram 15 milliGRAM(s) Oral with breakfast  gabapentin Solution 150 milliGRAM(s) Oral every 12 hours  influenza  Vaccine (HIGH DOSE) 0.5 milliLiter(s) IntraMuscular once  lacosamide Solution 200 milliGRAM(s) Oral two times a day  lamoTRIgine 50 milliGRAM(s) Oral two times a day  lanolin Ointment 1 Application(s) Topical daily  levETIRAcetam  Solution 750 milliGRAM(s) Oral two times a day  lurasidone 20 milliGRAM(s) Oral at bedtime  metoprolol tartrate 25 milliGRAM(s) Oral every 12 hours  nystatin Powder 1 Application(s) Topical every 8 hours  pantoprazole   Suspension 40 milliGRAM(s) Oral daily  polyethylene glycol 3350 17 Gram(s) Oral daily    MEDICATIONS  (PRN):  acetaminophen     Tablet .. 650 milliGRAM(s) Oral every 6 hours PRN Temp greater or equal to 38C (100.4F), Mild Pain (1 - 3)  aluminum hydroxide/magnesium hydroxide/simethicone Suspension 30 milliLiter(s) Oral every 4 hours PRN Dyspepsia  artificial tears (preservative free) Ophthalmic Solution 1 Drop(s) Both EYES every 6 hours PRN Dry Eyes  melatonin 3 milliGRAM(s) Oral at bedtime PRN Insomnia  ondansetron Injectable 4 milliGRAM(s) IV Push every 8 hours PRN Nausea and/or Vomiting    Vital Signs Last 24 Hrs  T(C): 36.5 (22 Mar 2025 04:30), Max: 36.9 (21 Mar 2025 17:54)  T(F): 97.7 (22 Mar 2025 04:30), Max: 98.5 (21 Mar 2025 17:54)  HR: 88 (22 Mar 2025 04:30) (69 - 112)  BP: 168/93 (22 Mar 2025 04:30) (145/87 - 168/93)  BP(mean): 106 (21 Mar 2025 14:30) (106 - 106)  RR: 20 (22 Mar 2025 04:30) (16 - 20)  SpO2: 99% (22 Mar 2025 04:30) (90% - 100%)    Parameters below as of 22 Mar 2025 04:30  Patient On (Oxygen Delivery Method): tracheostomy collar    Physical Exam:  Gen: NAD, tracheostomy with ETT  HEENT: EOMI, MMM  Chest: equal chest rise  Cardiac: regular   Abd: non distended    Labs:                        8.6    7.95  )-----------( 176      ( 21 Mar 2025 07:00 )             27.7     CBC Full  -  ( 21 Mar 2025 07:00 )  WBC Count : 7.95 K/uL  RBC Count : 2.80 M/uL  Hemoglobin : 8.6 g/dL  Hematocrit : 27.7 %  Platelet Count - Automated : 176 K/uL  Mean Cell Volume : 98.9 fl  Mean Cell Hemoglobin : 30.7 pg  Mean Cell Hemoglobin Concentration : 31.0 g/dL    03-21    141  |  104  |  24[H]  ----------------------------<  108[H]  4.1   |  28  |  1.42[H]    Ca    9.4      21 Mar 2025 07:00  Phos  2.8     03-21  Mg     1.9     03-21

## 2025-03-22 NOTE — PROGRESS NOTE ADULT - PROBLEM SELECTOR PLAN 8
- SHAGUFTA on CKD stage 3 suspected in setting of new infection Covid 19   - CR peaked to 4.34 on 3/3, now improving   - Renvela d/cd Hyperphosphatemia resolved   - Continue Retacrit 06016 U tiw sq

## 2025-03-22 NOTE — DISCHARGE NOTE PROVIDER - PROVIDER TOKENS
PROVIDER:[TOKEN:[[id number]]],PROVIDER:[TOKEN:[[id number]]],PROVIDER:[TOKEN:[[id number]]] PROVIDER:[TOKEN:[[id number]]],PROVIDER:[TOKEN:[[id number]]],PROVIDER:[TOKEN:[[id number]]],PROVIDER:[TOKEN:[[id number]]]

## 2025-03-22 NOTE — PROGRESS NOTE ADULT - ASSESSMENT
Mr. Gr is a 69 year old male with PMHx of seizure disorder, sleep apnea, HTN, chronic idiopathic constipation, stage III CKD, severe obesity, secondary hyperparathyroidism, anemia, thrombocytopenia, hyperlipidemia, testicular cancer under treatment with Dr. Ovalles who is admitted for acute hypoxic respiratory failure secondary to COVID vs superimposed pneumonia with new onset thrombocytopenia. He was recently discharged 02/06/2025 after a tenous stay including intubation in the ICU for respiratory failure in setting of seizure. He had recovered and was discharged to rehab.      Former smoker, quit 14y prior, denied ETOH, drug use. Lives at home. Does not have children. Denies family history of blood disorders or malignancy.  Denies previous workplace related exposures.  Denies previous history of blood transfusions.  Denied history of thromboses.  Denied history of  requiring anticoagulation.     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  Margins were negative.  pT3Nx. CT C/A/P with few left para-aortic and left iliac chain lymph nodes largest measuring 8.1 cm left para-aortic node, bilateral subcentimeter noncalcified pulmonary nodules measuring up to 7 mm. AFP, LDH, hCG were all elevated. 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 Started on adjuvant therapy with Cisplatin+Etoposide, so far completed 4 cycles of treatment with cisplatin dose reduced 50% and Etoposide 50% due to thrombocytopenia.      02/19/2025: on HFNC, noted tachycardia and fever, Klebsiella in urine as well, thrombocytopenia stable/improving, no signs of active bleeding     02/20/2025: developed A.fib with RVR and was placed on heparin drip and pending cardiology eval    02/21/2025: awake, on AVAPS overnight, noted RRT for hypoxia as pt removed HFNC at some point in time, good response thereafter     02/22/2025:  continues on AVAPS this morning, noted RRT overnight for hypoxia, hypercapnia, ICU following, US LE negative for DVT, counts overall stable/improved     02/23/2025: progressive respiratory failure, noted ongoing RTT this morning with pending intubation and transfer to MICU     02/24/2025: intubated 02/23/2025, sedated, on pressor support, no signs of bleeding, counts noted, no signs of bleeding     02/25/2025: continues in MICU, intubated and sedated, no signs of bleeding    02/26/2025: continues in MICU, intubated, without signs of bleeding, continues on heparin drip     02/27/2025:  remains under the care of the ICU, intubated, no signs of bleeding and continues on heparin drip, counts stable, has not required PRBC support this admission    02/28/2025: continues under the care of MICU, continues intubated, no signs of bleeding, on heparin drip    03/01/2025: continues in MICU, intubated, direct josefina IgG positive, eluate negative, not likely to be clinically significant     03/02/2025:  continues in MICU, nursing staff at bedside, no overnight events noted. CTH without acute intracranial pathology     03/03/2025: continues in MICU, intubated, on heparin drip, 1u PRBC overnight, no signs of bleeding, platelet count stable     03/04/2025: awake, moving arms spontaneously on exam, continues without much interaction however. No signs of bleeding, continues heparin drip, continues intubated in MICU     03/05/2025: extubated 03/04/2025, continues in MICU, awake and alert this morning and able to speak full sentences, discussed/reviewed findings, continues on heparin drip     03/06/2025: nursing staff at bedside, bipap overnight, without signs of bleeding, counts noted stable, renal function improving     03/07/2025:  no signs of bleeding, counts noted, continues heparin drip, continues in MICU    03/08/2025: on HFNC, no signs of bleeding, although alert and answering questions, not back to his baseline yet, continues in MICU    03/09/2025: continues in MICU, s/p re-intubation 03/08/2025 given respiratory compromise in the setting of copious secretions as evidenced by bronchoscopy, in setting of fever, now sedated, mild crusting of blood around mouth, without active sign of bleeding, counts noted reviewed, continues on heparin drip     03/10/2025: continues in MICU, no signs of bleeding, discussed with nursing staff at bedside, receiving 1u PRBC due to H/H downtrend, sputum culture with Pseudomonas, ICU and ID recs noted, continues to be intubated     03/11/2025: continues in MICU, intubated and sedated, noted counts, without signs of bleeding, appropriate response to transfusion    03/12/2025: continues in MICU, intubated and sedated, without signs of bleeding     03/13/2025: continues in MICU, s/p Tracheostomy 03/12/2025 with tracheal intubation, continues with sedation     03/14/2025:  awake and alert, mental status much improved, transferred from MICU to [address] [place] [address] 03/13/2025. Without significant events overnight, discussed with nursing staff at bedside, stated pt did well overnight, no signs of bleeding, no fever noted, medications provided via NGT and he is pending PEG-Tube placement today. His case is complex, noted with repeat need for intubations, discussed with pt, he is aware. Discussed with pts primary Oncologist as well.    03/15/2025: awake and alert, no overnight events noted, discussed with nursing staff, he did well overnight, no fevers, no signs of bleeding endorsed. He nods to information. Counts reviewed overall stable, discussed with him as well. Had PEG placed 03/14/2025 03/16/2025: no overnight events noted, comfortable overnight, nursing staff at bedside, stated pt did well, no signs of bleeding, had a BM. No fever reported. Counts reviewed, stable at this time     03/17/2025: no overnight events noted however continues with need for suctioning due to increase secretions, discussed with overnight staff, mild bleeding likely due to trauma from suctioning and recent tracheostomy placement without evidence of active bleeding otherwise. No fevers noted overnight. Had multiple smaller BMs, without evidence of bleeding, Nephrology recs and reviewed noted for Epo    03/18/2025: without significant overnight events, discussed with team at bedside, no evidence of bleeding, noted cardiology recs to start Eliquis 5mg BID for A.fib, currently on Lovenox, previously did require PRBC this admission but has since been stable, renal function seems to be improving, and Plt count improved, ok for AC per cardiology recs with Eliquis, monitor H/H closely     03/19/2025: comfortable, no overnight events noted, discussed with team, had 1 BM overnight without evidence of bleeding, now on Eliquis 5mg BID per cardiology recs for A.fib, renal function stable. Oxygen requirements stable overnight. No fevers reported. Pts primary Oncologist, Dr. Ovalles, aware, case discussed     03/20/2025: without significant events noted, continues on Eliquis, therapeutic, without signs of bleeding, continues on Epo, counts and imaging reviewed, no overnight events noted, continues with tracheostomy without increased oxygen requirements, increased secretions noted     03/21/2025: without significant overnight events noted. Discussed with overnight team, without evidence of bleeding, fever. Had a BM overnight, normal. Without increasing oxygen requirements, Without seizure like activity given hx of malignancy and cerebellar finding previously. Continues on Eliquis. Counts noted and reviewed, anemia, labile thrombocytopenia         #Acute hypoxic respiratory failure, Acute, Severe   # COVID  - CT noted with bilateral GGO  - ID following  - Pulmonary following  - Antibiotics per primary team/MICU and ID   - Intubated 02/23/2025 AM, MICU   - extubated 03/04/2025  - Pseudomonas from sputum culture   - Re-intubated 03/09/2025 due to increased work of breathing in setting of increased secretions, not protecting airway, tachypnea, hypoxia   - s/p Tracheostomy 03/12/2025    # Thrombocytopenia, Acute, Moderate   - Did occur during most recent admission and improved to normal prior to discharge   - Without signs of bleeding  - Could be multifactorial, possibly due to infection   - Continue to trend  - Transfuse to maintain Plt > 10k unless actively bleeding then maintain > 50k     # Brain lesion Hx of cancer, Acute, Severe   - pt with hx of seizures  - MRI brain now with 5.4 mm enhancing lesion in the LEFT middle cerebellar peduncle with associated edema suspicious for metastases  - MRI Lumbar negative for acute disease  - Small lesion without mass effect or edema, recommended to correlate per neurology if foci and locus are concordant with seizure activity  - Neurology recs noted, new lesion not likely to cause seizure  - It is rare, but nonetheless not impossible, for testicular cancer to be metastatic to the brain  - He will need another PET-CT, can be completed outpatient once stable if concern can proceed with biopsy at that time   - Previous endocrinology eval for thyroid nodule noted  - AFP/BHCH normal,  previously   - Repeat CTH without acute intracranial pathology     # 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 Ovalles, agree with current plan  - Follow up as outpatient with Franki Ovalles MD     # Acute kidney injury on CKD Stage IIIA, Acute, Moderate   - Likely multifactorial  - Nephrology consult and recs noted  - Now on therapeutic Eliquis, will need to be monitoring in high risk setting for bleeding   - Continue to trend     # Normocytic anemia, Acute, Severe   - Chronic, has hx of PRBC during chemo 07/2024  - Noted Anti E, Anti-K Anti-C antibodies previously  - direct josefina IgG positive, eluate negative, not likely to be clinically significant   - s/p 2u PRBC 03/03/2025 and 03/10/2025   - Monitor for bleeding  - Epo ordered  - Recommend to transfuse to maintain Hb > 7    # Klebsiella UTI, Acute, Severe > Moderate   # Pseudomonas UTI, Acute, Moderate   -Antibiotics per ID and primary team       Recommendations  - Trend CBC daily, obtain post transfusion CBC today   - Transfuse to maintain Hb > 7   - Transfuse to maintain Plt >10k unless active bleeding then >50k   - Cardiologyy recs noted, now on Eliquis 5mg BID for A.fib, monitor H/H         Thank you for allowing me to participate in the care of Mr. Gr please do not hesitate to call or text me if you have further questions or concerns.     Brayan Mart MD  [facility]   Division of Hematology/Oncology  [address] [place] [address], [address]  [place] [address]  P: [phone]  F: [phone]    Attestation:    ----Pt evaluated, >50min spent including face-to-face interaction in addition to chart review, reviewing treatment plan, discussing with care staff in hospital, his outside physician, and managing the patient’s chronic diagnoses as listed in the assessment----        Mr. Gr is a 69 year old male with PMHx of seizure disorder, sleep apnea, HTN, chronic idiopathic constipation, stage III CKD, severe obesity, secondary hyperparathyroidism, anemia, thrombocytopenia, hyperlipidemia, testicular cancer under treatment with Dr. Ovalles who is admitted for acute hypoxic respiratory failure secondary to COVID vs superimposed pneumonia with new onset thrombocytopenia. He was recently discharged 02/06/2025 after a tenous stay including intubation in the ICU for respiratory failure in setting of seizure. He had recovered and was discharged to rehab.      Former smoker, quit 14y prior, denied ETOH, drug use. Lives at home. Does not have children. Denies family history of blood disorders or malignancy.  Denies previous workplace related exposures.  Denies previous history of blood transfusions.  Denied history of thromboses.  Denied history of  requiring anticoagulation.     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  Margins were negative.  pT3Nx. CT C/A/P with few left para-aortic and left iliac chain lymph nodes largest measuring 8.1 cm left para-aortic node, bilateral subcentimeter noncalcified pulmonary nodules measuring up to 7 mm. AFP, LDH, hCG were all elevated. 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 Started on adjuvant therapy with Cisplatin+Etoposide, so far completed 4 cycles of treatment with cisplatin dose reduced 50% and Etoposide 50% due to thrombocytopenia.      02/19/2025: on HFNC, noted tachycardia and fever, Klebsiella in urine as well, thrombocytopenia stable/improving, no signs of active bleeding     02/20/2025: developed A.fib with RVR and was placed on heparin drip and pending cardiology eval    02/21/2025: awake, on AVAPS overnight, noted RRT for hypoxia as pt removed HFNC at some point in time, good response thereafter     02/22/2025:  continues on AVAPS this morning, noted RRT overnight for hypoxia, hypercapnia, ICU following, US LE negative for DVT, counts overall stable/improved     02/23/2025: progressive respiratory failure, noted ongoing RTT this morning with pending intubation and transfer to MICU     02/24/2025: intubated 02/23/2025, sedated, on pressor support, no signs of bleeding, counts noted, no signs of bleeding     02/25/2025: continues in MICU, intubated and sedated, no signs of bleeding    02/26/2025: continues in MICU, intubated, without signs of bleeding, continues on heparin drip     02/27/2025:  remains under the care of the ICU, intubated, no signs of bleeding and continues on heparin drip, counts stable, has not required PRBC support this admission    02/28/2025: continues under the care of MICU, continues intubated, no signs of bleeding, on heparin drip    03/01/2025: continues in MICU, intubated, direct josefina IgG positive, eluate negative, not likely to be clinically significant     03/02/2025:  continues in MICU, nursing staff at bedside, no overnight events noted. CTH without acute intracranial pathology     03/03/2025: continues in MICU, intubated, on heparin drip, 1u PRBC overnight, no signs of bleeding, platelet count stable     03/04/2025: awake, moving arms spontaneously on exam, continues without much interaction however. No signs of bleeding, continues heparin drip, continues intubated in MICU     03/05/2025: extubated 03/04/2025, continues in MICU, awake and alert this morning and able to speak full sentences, discussed/reviewed findings, continues on heparin drip     03/06/2025: nursing staff at bedside, bipap overnight, without signs of bleeding, counts noted stable, renal function improving     03/07/2025:  no signs of bleeding, counts noted, continues heparin drip, continues in MICU    03/08/2025: on HFNC, no signs of bleeding, although alert and answering questions, not back to his baseline yet, continues in MICU    03/09/2025: continues in MICU, s/p re-intubation 03/08/2025 given respiratory compromise in the setting of copious secretions as evidenced by bronchoscopy, in setting of fever, now sedated, mild crusting of blood around mouth, without active sign of bleeding, counts noted reviewed, continues on heparin drip     03/10/2025: continues in MICU, no signs of bleeding, discussed with nursing staff at bedside, receiving 1u PRBC due to H/H downtrend, sputum culture with Pseudomonas, ICU and ID recs noted, continues to be intubated     03/11/2025: continues in MICU, intubated and sedated, noted counts, without signs of bleeding, appropriate response to transfusion    03/12/2025: continues in MICU, intubated and sedated, without signs of bleeding     03/13/2025: continues in MICU, s/p Tracheostomy 03/12/2025 with tracheal intubation, continues with sedation     03/14/2025:  awake and alert, mental status much improved, transferred from MICU to [address] [place] [address] 03/13/2025. Without significant events overnight, discussed with nursing staff at bedside, stated pt did well overnight, no signs of bleeding, no fever noted, medications provided via NGT and he is pending PEG-Tube placement today. His case is complex, noted with repeat need for intubations, discussed with pt, he is aware. Discussed with pts primary Oncologist as well.    03/15/2025: awake and alert, no overnight events noted, discussed with nursing staff, he did well overnight, no fevers, no signs of bleeding endorsed. He nods to information. Counts reviewed overall stable, discussed with him as well. Had PEG placed 03/14/2025 03/16/2025: no overnight events noted, comfortable overnight, nursing staff at bedside, stated pt did well, no signs of bleeding, had a BM. No fever reported. Counts reviewed, stable at this time     03/17/2025: no overnight events noted however continues with need for suctioning due to increase secretions, discussed with overnight staff, mild bleeding likely due to trauma from suctioning and recent tracheostomy placement without evidence of active bleeding otherwise. No fevers noted overnight. Had multiple smaller BMs, without evidence of bleeding, Nephrology recs and reviewed noted for Epo    03/18/2025: without significant overnight events, discussed with team at bedside, no evidence of bleeding, noted cardiology recs to start Eliquis 5mg BID for A.fib, currently on Lovenox, previously did require PRBC this admission but has since been stable, renal function seems to be improving, and Plt count improved, ok for AC per cardiology recs with Eliquis, monitor H/H closely     03/19/2025: comfortable, no overnight events noted, discussed with team, had 1 BM overnight without evidence of bleeding, now on Eliquis 5mg BID per cardiology recs for A.fib, renal function stable. Oxygen requirements stable overnight. No fevers reported. Pts primary Oncologist, Dr. Ovalles, aware, case discussed     03/20/2025: without significant events noted, continues on Eliquis, therapeutic, without signs of bleeding, continues on Epo, counts and imaging reviewed, no overnight events noted, continues with tracheostomy without increased oxygen requirements, increased secretions noted     03/21/2025: without significant overnight events noted. Discussed with overnight team, without evidence of bleeding, fever. Had a BM overnight, normal. Without increasing oxygen requirements, Without seizure like activity given hx of malignancy and cerebellar finding previously. Continues on Eliquis. Counts noted and reviewed, anemia, labile thrombocytopenia     03/22/2025:  no overnight events noted, discussed with overnight staff, pt did well, without increased oxygen requirements, without signs of bleeding. Had one BM overnight, without bleeding. Continues on Eliquis, Renal function is stable and appropriate. Counts and labs reviewed, overall stable however anemia persists.         #Acute hypoxic respiratory failure, Acute, Severe   # COVID  - CT noted with bilateral GGO  - ID following  - Pulmonary following  - Antibiotics per primary team/MICU and ID   - Intubated 02/23/2025 AM, MICU   - extubated 03/04/2025  - Pseudomonas from sputum culture   - Re-intubated 03/09/2025 due to increased work of breathing in setting of increased secretions, not protecting airway, tachypnea, hypoxia   - s/p Tracheostomy 03/12/2025    # Thrombocytopenia, Acute, Moderate   - Did occur during most recent admission and improved to normal prior to discharge   - Without signs of bleeding  - Could be multifactorial, possibly due to infection   - Continue to trend  - Transfuse to maintain Plt > 10k unless actively bleeding then maintain > 50k     # Brain lesion Hx of cancer, Acute, Severe   - pt with hx of seizures  - MRI brain now with 5.4 mm enhancing lesion in the LEFT middle cerebellar peduncle with associated edema suspicious for metastases  - MRI Lumbar negative for acute disease  - Small lesion without mass effect or edema, recommended to correlate per neurology if foci and locus are concordant with seizure activity  - Neurology recs noted, new lesion not likely to cause seizure  - It is rare, but nonetheless not impossible, for testicular cancer to be metastatic to the brain  - He will need another PET-CT, can be completed outpatient once stable if concern can proceed with biopsy at that time   - Previous endocrinology eval for thyroid nodule noted  - AFP/BHCH normal,  previously   - Repeat CTH without acute intracranial pathology     # 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 Ovalles, agree with current plan  - Follow up as outpatient with Franki Ovalles MD     # Acute kidney injury on CKD Stage IIIA, Acute, Moderate   - Likely multifactorial  - Nephrology consult and recs noted  - Now on therapeutic Eliquis, will need to be monitoring in high risk setting for bleeding   - Continue to trend     # Normocytic anemia, Acute, Severe   - Chronic, has hx of PRBC during chemo 07/2024  - Noted Anti E, Anti-K Anti-C antibodies previously  - direct josefina IgG positive, eluate negative, not likely to be clinically significant   - s/p 2u PRBC 03/03/2025 and 03/10/2025   - Monitor for bleeding  - Epo ordered  - Recommend to transfuse to maintain Hb > 7    # Klebsiella UTI, Acute, Severe > Moderate   # Pseudomonas UTI, Acute, Moderate   -Antibiotics per ID and primary team       Recommendations  - Trend CBC daily  - Transfuse to maintain Hb > 7   - Transfuse to maintain Plt >10k unless active bleeding then >50k   - Cardiology recs noted, now on Eliquis 5mg BID for A.fib, monitor H/H         Thank you for allowing me to participate in the care of Mr. Gr please do not hesitate to call or text me if you have further questions or concerns.     Brayan Mart MD  [facility]   Division of Hematology/Oncology  [address] [place] [address], [address]  [place] [id number]  P: [phone]  F: [phone]    Attestation:    ----Pt evaluated, >50min spent including face-to-face interaction in addition to chart review, reviewing treatment plan, discussing with care staff in hospital, his outside physician, and managing the patient’s chronic diagnoses as listed in the assessment----

## 2025-03-22 NOTE — DISCHARGE NOTE PROVIDER - HOSPITAL COURSE
69 year old male with a past medical history of hypertension, hyperlipemia VAZQUEZ (on CPAP at bedtime, NC 2 liters during the day), CKD stage 3, epilepsy, schizophrenia, developmental delay, metastatic testicular cancer (s/p orchiectomy, actively on chemotherapy, last dose of etoposide/cisplatin on 6/2024), presenting with acute on chronic hypoxemic respiratory failure, secondary to (a) COVID-19 infection, (b) superimposed pneumonia after recent influenza infection,  Transferred to MICU on 2/23 after RRT on floors due to hypoxia even with max setting AVAPS. Patient started on midodrine and weaned off of levo on 2/24. Failed pressure support on 2/25 and started diuresis with Bumex. CTH was unremarkable and weaned off of Precedex on 3/1. On 3/4 patient extubated and GOC conversations with family revealed that they would want patient to be trached. On 3/7, patient found to have RLL consolidation on POCUS and started on Zosyn, intubated on 3/8 due to increased lethargy and inability to clear oral secretions. Trach placed on 3/12. Transferred to RCU on 3/13. S/p Peg 3/14.  EGD with normal findings. Tolerating TC ATC since 3/16.   TTE performed 3/18 no obvious sign of left atrial appendage. Recommended by Cards and Cleared by heme for Eliquis 5mg BID. H/H stable. Tolerating PMV trials. Failed FEES 3/18. Trach downsized to a 7 Cuffless Portex on 3/20; Fio2 at 28%.  Patient working with Speech for therapeutic swallowing exercises and PT.   69 year old male with a past medical history of hypertension, hyperlipemia VAZQUEZ (on CPAP at bedtime, NC 2 liters during the day), CKD stage 3, epilepsy, schizophrenia, developmental delay, metastatic testicular cancer (s/p orchiectomy, actively on chemotherapy, last dose of etoposide/cisplatin on 6/2024), presenting with acute on chronic hypoxemic respiratory failure, secondary to (a) COVID-19 infection, (b) superimposed pneumonia after recent influenza infection,  Transferred to MICU on 2/23 after RRT on floors due to hypoxia even with max setting AVAPS. Patient started on midodrine and weaned off of levo on 2/24. Failed pressure support on 2/25 and started diuresis with Bumex. CTH was unremarkable and weaned off of Precedex on 3/1. On 3/4 patient extubated and GOC conversations with family revealed that they would want patient to be trached. On 3/7, patient found to have RLL consolidation on POCUS and started on Zosyn, intubated on 3/8 due to increased lethargy and inability to clear oral secretions. Trach placed on 3/12. Transferred to RCU on 3/13. S/p Peg 3/14.  EGD with normal findings. Tolerating TC ATC since 3/16.  TTE performed 3/18 no obvious sign of left atrial appendage. Recommended by Cards and Cleared by [facility] for Eliquis 5mg BID. H/H stable. Tolerating PMV trials. Failed FEES 3/18. Trach downsized to a 7 Cuffless Portex on 3/20; Fio2 at 28%. Passed MBS 3/26 and diet advanced to regular diet, tolerating well. Speech has recc for patient be placed in a complete upright positioning - ideally OOB to chair; otherwise, can utilize bed in chair position/ elevate HOB. Patient deemed medically stable and cleared for Transfer to Acute Rehab by Attending Physician.       Important Medication Changes and Reason:    Active or Pending Issues Requiring Follow-up:    Follow up with ENT for routine Trach exchanges every 3-6 months   Follow up with Pulmonary/ Sleep medicine for VAZQUEZ and Tracheostomy care w/ oxygen in 2 weeks   Follow up as outpatient with Heme ONC Franki Ovalles MD and for possible PET-CT as outpatient in 2 weeks   Follow up with your neurology as outpatient for management of your seizures   Outpatient Follow up need with your cardiologist for management of your Anticoagulation and Heart failure in 2 weeks   Outpatient follow up with Nephrology needed for management of your CKD in 2-4 weeks       Advanced Directives:   [ x] Full code  [ ] DNR  [ ] Hospice                 69 year old male with a past medical history of hypertension, hyperlipemia VAZQUEZ (on CPAP at bedtime, NC 2 liters during the day), CKD stage 3, epilepsy, schizophrenia, developmental delay, metastatic testicular cancer (s/p orchiectomy, actively on chemotherapy, last dose of etoposide/cisplatin on 6/2024), presenting with acute on chronic hypoxemic respiratory failure, secondary to (a) COVID-19 infection, (b) superimposed pneumonia after recent influenza infection,  Transferred to MICU on 2/23 after RRT on floors due to hypoxia even with max setting AVAPS. Patient started on midodrine and weaned off of levo on 2/24. Failed pressure support on 2/25 and started diuresis with Bumex. CTH was unremarkable and weaned off of Precedex on 3/1. On 3/4 patient extubated and GOC conversations with family revealed that they would want patient to be trached. On 3/7, patient found to have RLL consolidation on POCUS and started on Zosyn, intubated on 3/8 due to increased lethargy and inability to clear oral secretions. Trach placed on 3/12. Transferred to RCU on 3/13. S/p Peg 3/14.  EGD with normal findings. Tolerating TC ATC since 3/16.  TTE performed 3/18 no obvious sign of left atrial appendage. Recommended by Cards and Cleared by [facility] for Eliquis 5mg BID. H/H stable. Tolerating PMV trials. Failed FEES 3/18. Trach downsized to a 7 Cuffless Portex on 3/20; Fio2 at 28%. Passed MBS 3/26 and diet advanced to regular diet, tolerating well. Speech has recc for patient be placed in a complete upright positioning - ideally OOB to chair; otherwise, can utilize bed in chair position/ elevate HOB. Patient deemed medically stable and cleared for Transfer to Acute Rehab by Attending Physician.       Important Medication Changes and Reason:  Your where started on an Anticoagulant and Metoprolol for management of your Afib     Active or Pending Issues Requiring Follow-up:    Follow up with ENT for routine Trach exchanges every 3-6 months   Follow up with Pulmonary/ Sleep medicine for VAZQUEZ and Tracheostomy care w/ oxygen in 2 weeks   Follow up as outpatient with [facility] ON[facility] Franki Ovalles MD and for possible PET-CT as outpatient in 2 weeks   Follow up with your neurology as outpatient for management of your seizures   Outpatient Follow up need with your cardiologist for management of your Anticoagulation and Heart failure in 2 weeks   Outpatient follow up with Nephrology needed for management of your CKD in 2-4 weeks       Advanced Directives:   [ x] Full code  [ ] DNR  [ ] Hospice

## 2025-03-22 NOTE — DISCHARGE NOTE PROVIDER - NSDCMRMEDTOKEN_GEN_ALL_CORE_FT
atorvastatin 20 mg oral tablet: 1 tab(s) orally once a day (at bedtime)  cholecalciferol 25 mcg (1000 intl units) oral tablet: 1 tab(s) orally once a day  escitalopram 5 mg oral tablet: 3 tab(s) orally once a day  gabapentin 250 mg/5 mL oral solution: 3 milliliter(s) orally 2 times a day  ipratropium-albuterol 0.5 mg-2.5 mg/3 mL inhalation solution: 3 milliliter(s) inhaled every 6 hours  lacosamide 100 mg oral tablet: 1 tab(s) orally 2 times a day  lamoTRIgine 100 mg oral tablet: 1 tab(s) orally 2 times a day  levETIRAcetam 750 mg oral tablet: 1 tab(s) orally 2 times a day  lidocaine 4% topical film: Apply topically to affected area once a day  lurasidone 40 mg oral tablet: 1 tab(s) orally once a day  nystatin 100,000 units/g topical powder: 1 Apply topically to affected area 3 times a day As needed fungal infection you may see and want to treat  pantoprazole 40 mg oral delayed release tablet: 1 tab(s) orally once a day (before a meal)  polyethylene glycol 3350 oral powder for reconstitution: 17 gram(s) orally once a day  senna leaf extract oral tablet: 2 tab(s) orally once a day (at bedtime)   acetaminophen 325 mg oral tablet: 2 tab(s) by gastrostomy tube every 6 hours as needed for Temp greater or equal to 38C (100.4F), Mild Pain (1 - 3)  aluminum hydroxide-magnesium hydroxide 200 mg-200 mg/5 mL oral suspension: 30 milliliter(s) by gastrostomy tube every 4 hours as needed for Dyspepsia  apixaban 5 mg oral tablet: 1 tab(s) by gastrostomy tube every 12 hours  atorvastatin 20 mg oral tablet: 1 tab(s) by gastrostomy tube once a day (at bedtime)  cholecalciferol 25 mcg (1000 intl units) oral tablet: 1 tab(s) by gastrostomy tube once a day  epoetin krystyna 10,000 units/mL injectable solution: 10,000 unit(s) injectable 3 times a week  escitalopram 5 mg oral tablet: 3 tab(s) by gastrostomy tube once a day  gabapentin 250 mg/5 mL oral solution: 3 milliliter(s) by gastrostomy tube 2 times a day  ipratropium-albuterol 0.5 mg-2.5 mg/3 mL inhalation solution: 3 milliliter(s) inhaled every 6 hours  lacosamide 100 mg oral tablet: 2 tab(s) by gastrostomy tube 2 times a day  lamoTRIgine 100 mg oral tablet: 100 milligram(s) by gastrostomy tube 2 times a day  lamoTRIgine 25 mg oral tablet: 2 tab(s) by gastrostomy tube 2 times a day  levETIRAcetam 750 mg oral tablet: 1 tab(s) by gastrostomy tube 2 times a day  lidocaine 4% topical film: Apply topically to affected area once a day  lurasidone 40 mg oral tablet: 1 tab(s) by gastrostomy tube once a day  melatonin 3 mg oral tablet: 1 tab(s) by gastrostomy tube once a day (at bedtime) as needed for Insomnia  metoprolol tartrate 25 mg oral tablet: 1 tab(s) by gastrostomy tube every 12 hours  nystatin 100,000 units/g topical powder: 1 Apply topically to affected area 3 times a day As needed fungal infection you may see and want to treat  ocular lubricant preservative-free ophthalmic solution: 1 drop(s) to each affected eye every 6 hours As needed Dry Eyes  pantoprazole 40 mg oral delayed release tablet: 1 tab(s) by gastrostomy tube once a day (before a meal)  polyethylene glycol 3350 oral powder for reconstitution: 17 gram(s) by gastrostomy tube once a day as needed for  constipation  senna leaf extract oral tablet: 2 tab(s) by gastrostomy tube once a day (at bedtime) as needed for  constipation

## 2025-03-22 NOTE — DISCHARGE NOTE PROVIDER - INSTRUCTIONS
Banatrol 1x/day Jevity 1.5 dominic @ 60 cc/hr x 24 hrs Banatrol 1x/day Regular Diet Moderately thick liquids

## 2025-03-22 NOTE — DISCHARGE NOTE PROVIDER - CARE PROVIDER_API CALL
Brayan Mart  Hematology  [address], [address]  [place] [address]  Phone: [phone]  Fax: [phone]  Follow Up Time:     Dao Morris  Nephrology  [address], [address]  [place] [address]  Phone: [phone]  Fax: [phone]  Follow Up Time:     Gus Andrew  Cardiovascular Disease  [address], [address]  [place] [address]  Phone: [phone]  Fax: [phone]  Follow Up Time:    Dao Morris  Nephrology  [address], [address]  [place] [address]  Phone: [phone]  Fax: [phone]  Follow Up Time:     Gus Andrew  Cardiovascular Disease  [address]  [place] [address]  Phone: [phone]  Fax: [phone]  Follow Up Time:     Franki Ovalles  [facility]  [address], [address]  [place] [address]  Phone: [phone]  Fax: [phone]  Follow Up Time:     Olivia Campuzano  Pulmonary Disease  [address] [place] [address]  Phone: [phone]  Fax: [phone]  Follow Up Time:

## 2025-03-22 NOTE — DISCHARGE NOTE PROVIDER - NSDCCPCAREPLAN_GEN_ALL_CORE_FT
PRINCIPAL DISCHARGE DIAGNOSIS  Diagnosis: Acute hypoxic respiratory failure  Assessment and Plan of Treatment: - Primarily caused by Covid, superimposed by bacterial pneumonia  - Intubated 02/23, extubated 03/04  - Patient with worsening secretions and consolidations on 3/8, requiring re-intubation   - S/p tracheostomy on 3/12 by MICU  - Tolerating TC ATC since 3/16; FIO2 ( 28%)  - Tolerating PMV Trials   - Trach downsize to a 7 Cuffless Portex 3/20  - BAL culture 3/8: Pseudomonas  - Txd with Levaquin renally dosed 750 q48h (3/9-3/16).        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 Ovalles 12/03/2024, markers continued to be negative  - MRI Neck showed 4.2 cm RIGHT upper lobe mass/infiltrate  - MRI brain now with 5.4 mm enhancing lesion in the LEFT middle cerebellar peduncle w/edema suspicious ? mets  - MRI Lumbar spine negative for acute disease   - As per heme rare, but not impossible, for testicular cancer to be metastatic to the brain  - Will need another PET-CT, as outpatient once stable if concern can proceed with RUL biopsy at that time.  - Patient will need to f/u with Heme as outpatient    Diagnosis: Epilepsy  Assessment and Plan of Treatment: - Continue with home meds:  - Lacosamide, Keppra, and Lamotrigine  - Multiple EEG's negative no seizure, recently done 3/4.    Diagnosis: Paroxysmal atrial fibrillation  Assessment and Plan of Treatment: - New onset Afib with RVR, EKG 2/19  - Heparin drip stopped initially due to H/H instability while in MICU   - TTE repeated on 3/17: No obvious Left atrial Thrombus   - Cont Lopressor 25 mg q 12 hrs for rate control   - Initiated Eliquis 5 mg q 12 hrs this admission ( Cleared by Heme)  - Monitor BP / HR.      Diagnosis: Acute on chronic heart failure with preserved ejection fraction  Assessment and Plan of Treatment: - Chronic heart failure with preserved ejection fraction.   - TTE 1/17: LV systolic fxn appears nl without any regional wall motion abnormalities  - S/p bumex gtt in MICU      Diagnosis: Dysphagia  Assessment and Plan of Treatment: - S/p Peg placement 3/14. EGD with normal findings  - Tolerating feeds at goal rate  - Repeat FEES 3/18: Not cleared for enteral diet  - Continue therapeautic swallowing excercises    Diagnosis: Chronic kidney disease (CKD)  Assessment and Plan of Treatment: - SHAGUFTA on CKD stage 3 suspected in setting of new infection Covid 19   - CR peaked to 4.34 on 3/3, now improving   - Renvela d/cd Hyperphosphatemia resolved   - Continue Retacrit 02415 U tiw sq      Diagnosis: Schizophrenia  Assessment and Plan of Treatment: - Continue Lurasidone    Diagnosis: Goals of care, counseling/discussion  Assessment and Plan of Treatment: - Full Code  - Contact: Sister Ester [phone]      Diagnosis: Prophylactic measure  Assessment and Plan of Treatment: - DVT PPX: Cont Eliquis  - PUD PPX: Cont PPI.       PRINCIPAL DISCHARGE DIAGNOSIS  Diagnosis: Acute hypoxic respiratory failure  Assessment and Plan of Treatment: - Primarily caused by Covid, superimposed by bacterial pneumonia  - Intubated 02/23, extubated 03/04  - Patient with worsening secretions and consolidations on 3/8, requiring re-intubation   - S/p tracheostomy on 3/12 by MICU  - Tolerating TC ATC since 3/16; FIO2 ( 28%)  - Tolerating PMV Trials   - Trach downsize to a 7 Cuffless Portex 3/20  - BAL culture 3/8: Pseudomonas  - Txd with Levaquin renally dosed 750 q48h (3/9-3/16).        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 Ovalles 12/03/2024, markers continued to be negative  - MRI Neck showed 4.2 cm RIGHT upper lobe mass/infiltrate  - MRI brain now with 5.4 mm enhancing lesion in the LEFT middle cerebellar peduncle w/edema suspicious ? mets  - MRI Lumbar spine negative for acute disease   - As per heme rare, but not impossible, for testicular cancer to be metastatic to the brain  - Will need another PET-CT, as outpatient once stable if concern can proceed with RUL biopsy at that time.  - Patient will need to f/u with Heme as outpatient    Diagnosis: Epilepsy  Assessment and Plan of Treatment: - Continue with home meds:  - Lacosamide, Keppra, and Lamotrigine  - Multiple EEG's negative no seizure, recently done 3/4.    Diagnosis: Paroxysmal atrial fibrillation  Assessment and Plan of Treatment: - New onset Afib with RVR, EKG 2/19  - Heparin drip stopped initially due to H/H instability while in MICU   - TTE repeated on 3/17: No obvious Left atrial Thrombus   - Cont Lopressor 25 mg q 12 hrs for rate control   - Initiated Eliquis 5 mg q 12 hrs this admission ( Cleared by Heme)  - Monitor BP / HR.      Diagnosis: Acute on chronic heart failure with preserved ejection fraction  Assessment and Plan of Treatment: - Chronic heart failure with preserved ejection fraction.   - TTE 1/17: LV systolic fxn appears nl without any regional wall motion abnormalities  - S/p bumex gtt in MICU      Diagnosis: Dysphagia  Assessment and Plan of Treatment: - S/p Peg placement 3/14. EGD with normal findings  - MBS repeated 3/26: Cleared for regular diet and moderately thick liquids  - Monitor caloric intake    Diagnosis: Chronic kidney disease (CKD)  Assessment and Plan of Treatment: - SHAGUFTA on CKD stage 3 suspected in setting of new infection Covid 19   - CR peaked to 4.34 on 3/3, now improving   - Renvela d/cd Hyperphosphatemia resolved   - Continue Retacrit [id number] U tiw sq      Diagnosis: Schizophrenia  Assessment and Plan of Treatment: - Continue Lurasidone    Diagnosis: Goals of care, counseling/discussion  Assessment and Plan of Treatment: - Full Code  - Contact: Sister Ester [phone]      Diagnosis: Prophylactic measure  Assessment and Plan of Treatment: - DVT PPX: Cont Eliquis  - PUD PPX: Cont PPI.       PRINCIPAL DISCHARGE DIAGNOSIS  Diagnosis: Acute hypoxic respiratory failure  Assessment and Plan of Treatment: - Primarily caused by Covid, superimposed by bacterial pneumonia  - Intubated 02/23, extubated 03/04  - Patient with worsening secretions and consolidations on 3/8, requiring re-intubation   - S/p tracheostomy on 3/12 by MICU  - Tolerating TC ATC since 3/16; FIO2 ( 28%)  - Tolerating PMV Trials   - Trach downsize to a 7 Cuffless Portex 3/20  - BAL culture 3/8: Pseudomonas  - Txd with Levaquin renally dosed 750 q48h (3/9-3/16).        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 Ovalles 12/03/2024, markers continued to be negative  - MRI Neck showed 4.2 cm RIGHT upper lobe mass/infiltrate  - MRI brain now with 5.4 mm enhancing lesion in the LEFT middle cerebellar peduncle w/edema suspicious ? mets  - MRI Lumbar spine negative for acute disease   - As per heme rare, but not impossible, for testicular cancer to be metastatic to the brain  - Will need another PET-CT, as outpatient once stable if concern can proceed with RUL biopsy at that time.  - Patient will need to f/u with Heme as outpatient    Diagnosis: Epilepsy  Assessment and Plan of Treatment: - Continue with home meds:  - Lacosamide, Keppra, and Lamotrigine  - Multiple EEG's negative no seizure, recently done 3/4.    Diagnosis: Paroxysmal atrial fibrillation  Assessment and Plan of Treatment: - New onset Afib with RVR, EKG 2/19  - Heparin drip stopped initially due to H/H instability while in MICU   - TTE repeated on 3/17: No obvious Left atrial Thrombus   - Cont Lopressor 25 mg q 12 hrs for rate control   - Initiated Eliquis 5 mg q 12 hrs this admission ( Cleared by Heme)  - Monitor BP / HR.      Diagnosis: Acute on chronic heart failure with preserved ejection fraction  Assessment and Plan of Treatment: - Chronic heart failure with preserved ejection fraction.   - TTE 1/17: LV systolic fxn appears nl without any regional wall motion abnormalities  - S/p bumex gtt in MICU  - Outpatient with your cardiologist recommended in 2-4 weeks of your discharge       Diagnosis: Schizophrenia  Assessment and Plan of Treatment: - Continue Lurasidone    Diagnosis: Dysphagia  Assessment and Plan of Treatment: - S/p Peg placement 3/14. EGD with normal findings  - MBS repeated 3/26: Cleared for regular diet and moderately thick liquids  - Monitor caloric intake    Diagnosis: Chronic kidney disease (CKD)  Assessment and Plan of Treatment: - SHAGUFTA on CKD stage 3 suspected in setting of new infection Covid 19   - CR peaked to 4.34 on 3/3, now improving   - Renvela d/cd Hyperphosphatemia resolved   - Continue Retacrit 53360 U tiw sq      Diagnosis: Prophylactic measure  Assessment and Plan of Treatment: - DVT PPX: Cont Eliquis  - PUD PPX: Cont PPI.      Diagnosis: Goals of care, counseling/discussion  Assessment and Plan of Treatment: - Full Code  - Contact: Sister Ester [phone]

## 2025-03-22 NOTE — PROGRESS NOTE ADULT - PROBLEM SELECTOR PLAN 10
- Full Code  - Contact: Sister Ester [phone]  - PMR follow up on Monday to determine ROCHELLE vs Acute Rehab upon discharge

## 2025-03-22 NOTE — PROGRESS NOTE ADULT - ASSESSMENT
69 year old male with a past medical history of hypertension, hyperlipemia VAZQUEZ (on CPAP at bedtime, NC 2 liters during the day), CKD stage 3, epilepsy, schizophrenia, developmental delay, metastatic testicular cancer (s/p orchiectomy, actively on chemotherapy, last dose of etoposide/cisplatin on 6/2024), presenting with acute on chronic hypoxemic respiratory failure, secondary to (a) COVID-19 infection, (b) superimposed pneumonia after recent influenza infection,  Transferred to MICU on 2/23 after RRT on floors due to hypoxia even with max setting AVAPS. Patient started on midodrine and weaned off of levo on 2/24. Failed pressure support on 2/25 and started diuresis with Bumex. CTH was unremarkable and weaned off of Precedex on 3/1. On 3/4 patient extubated and GOC conversations with family revealed that they would want patient to be trached. On 3/7, patient found to have RLL consolidation on POCUS and started on Zosyn, intubated on 3/8 due to increased lethargy and inability to clear oral secretions. Trach placed on 3/12. Transferred to RCU on 3/13. S/p Peg 3/14.  EGD with normal findings. Tolerating TC ATC since 3/16.   TTE performed 3/18 no obvious sign of left atrial appendage. Recommended by Cards and Cleared by heme for Eliquis 5mg BID. H/H stable. Tolerating PMV trials. Failed FEES 3/18. Trach downsized to a 7 Cuffless Portex on 3/20; Fio2 at 28%.       3/21: No events reported overnight. Patient continues to work with Speech for therapeutic swallowing exercises and PT for possible transfer to acute rehab. PMR to re-eval on Monday for final determination. 69 year old male with a past medical history of hypertension, hyperlipemia VAZQUEZ (on CPAP at bedtime, NC 2 liters during the day), CKD stage 3, epilepsy, schizophrenia, developmental delay, metastatic testicular cancer (s/p orchiectomy, actively on chemotherapy, last dose of etoposide/cisplatin on 6/2024), presenting with acute on chronic hypoxemic respiratory failure, secondary to (a) COVID-19 infection, (b) superimposed pneumonia after recent influenza infection,  Transferred to MICU on 2/23 after RRT on floors due to hypoxia even with max setting AVAPS. Patient started on midodrine and weaned off of levo on 2/24. Failed pressure support on 2/25 and started diuresis with Bumex. CTH was unremarkable and weaned off of Precedex on 3/1. On 3/4 patient extubated and GOC conversations with family revealed that they would want patient to be trached. On 3/7, patient found to have RLL consolidation on POCUS and started on Zosyn, intubated on 3/8 due to increased lethargy and inability to clear oral secretions. Trach placed on 3/12. Transferred to RCU on 3/13. S/p Peg 3/14.  EGD with normal findings. Tolerating TC ATC since 3/16.   TTE performed 3/18 no obvious sign of left atrial appendage. Recommended by Cards and Cleared by heme for Eliquis 5mg BID. H/H stable. Tolerating PMV trials. Failed FEES 3/18. Trach downsized to a 7 Cuffless Portex on 3/20; Fio2 at 28%.       3/21: No events reported overnight. Patient continues to work with Speech for therapeutic swallowing exercises and PT for possible transfer to acute rehab. PMR to re-eval on Monday for final determination.  3/22-no new events 69 year old male with a past medical history of hypertension, hyperlipemia VAZQUEZ (on CPAP at bedtime, NC 2 liters during the day), CKD stage 3, epilepsy, schizophrenia, developmental delay, metastatic testicular cancer (s/p orchiectomy, actively on chemotherapy, last dose of etoposide/cisplatin on 6/2024), presenting with acute on chronic hypoxemic respiratory failure, secondary to (a) COVID-19 infection, (b) superimposed pneumonia after recent influenza infection,  Transferred to MICU on 2/23 after RRT on floors due to hypoxia even with max setting AVAPS. Patient started on midodrine and weaned off of levo on 2/24. Failed pressure support on 2/25 and started diuresis with Bumex. CTH was unremarkable and weaned off of Precedex on 3/1. On 3/4 patient extubated and GOC conversations with family revealed that they would want patient to be trached. On 3/7, patient found to have RLL consolidation on POCUS and started on Zosyn, intubated on 3/8 due to increased lethargy and inability to clear oral secretions. Trach placed on 3/12. Transferred to RCU on 3/13. S/p Peg 3/14.  EGD with normal findings. Tolerating TC ATC since 3/16.   TTE performed 3/18 no obvious sign of left atrial appendage. Recommended by Cards and Cleared by heme for Eliquis 5mg BID. H/H stable. Tolerating PMV trials. Failed FEES 3/18. Trach downsized to a 7 Cuffless Portex on 3/20; Fio2 at 28%.       3/22: No events reported overnight. Patient continues to work with Speech for therapeutic swallowing exercises and PT for possible transfer to acute rehab. PMR to re-eval on Monday for final determination. MBS possibly for next week.

## 2025-03-22 NOTE — DISCHARGE NOTE PROVIDER - NSDCFUADDAPPT_GEN_ALL_CORE_FT
Follow up with ENT for routine Trach exchanges every 3-6 months     Follow up with Pulmonary/ Sleep medicine for VAZQUEZ and Tracheostomy care w/ oxygen in 2 weeks     Follow up with your neurology as outpatient for management of your seizures     Follow up as outpatient with Heme ONC Franki Ovalles MD and for possible PET-CT as outpatient in 2 weeks    Outpatient Follow up need with your cardiologist for management of your Anticoagulation and Heart failure in 2 weeks     Outpatient follow up with Nephrology needed for management of your CKD in 2-4 weeks

## 2025-03-22 NOTE — PROGRESS NOTE ADULT - SUBJECTIVE AND OBJECTIVE BOX
Patient is a 69y old  Male who presents with a chief complaint of Acute on chronic hypoxemic respiratory failure (22 Mar 2025 05:45)    HPI:  69 year old male with a past medical history of hypertension, hyperlipemia VAZQUEZ (on CPAP at bedtime, NC 2 liters during the day), CKD stage 3, epilepsy, schizophrenia, developmental delay, metastatic testicular cancer (s/p orchiectomy, actively on chemotherapy, last dose of etoposide/cisplatin on 6/2024) presenting with worsening shortness of breath.   Patient was recently hospitalized at [facility] from January 27th 2025 to February 6th 2025 for seizure and influenza virus infection, which required intubation. He was sent to rehab (originally from home) from hospital.   He returns due to sudden onset shortness of breath and worsening oxygenation. Of note, he tested positive for COVID-19 at facility on 2/13/25 and was not put on any COVID-19 treatment at the time, only guaifenesin and scopolamine patch. Patient was placed on non-rebreather and sent to the hospital on 2/16/25.   In the ER, vital signs significant for T-max 101.9F, HR 84-90. WBC 6.8. Hemoglobin 9.1. Platelet 87. AST 70. ALT 48. Cr 3.27. BUN 45. pH 7.30, pCO2 48. UA negative for infection. Patient given vancomycin, Zosyn, albuterol, and  cc bolus. Patient admitted to the general medicine service for further care.   Patient evaluated at bedside during admission. Unable to talk with the patient given that he is on BIPAP. Attempted to take the patient off BIPAP while in the room, but patient's work of breathing immediately began to increase and patient endorsed shortness of breath, so he was placed back on. History taken by chart review.  (16 Feb 2025 13:16)    Interval Events:    REVIEW OF SYSTEMS:  [ ] Positive  [ ] All other systems negative  [ ] Unable to assess ROS because ________    Vital Signs Last 24 Hrs  T(C): 36.5 (03-22-25 @ 04:30), Max: 36.9 (03-21-25 @ 17:54)  T(F): 97.7 (03-22-25 @ 04:30), Max: 98.5 (03-21-25 @ 17:54)  HR: 68 (03-22-25 @ 06:48) (68 - 112)  BP: 168/93 (03-22-25 @ 04:30) (145/87 - 168/93)  RR: 20 (03-22-25 @ 04:30) (16 - 20)  SpO2: 100% (03-22-25 @ 06:48) (90% - 100%)    PHYSICAL EXAM:  HEENT:   [ ]Tracheostomy:  [ ]Pupils equal  [ ]No oral lesions  [ ]Abnormal    SKIN  [ ] No Rash  [ ] Abnormal  [ ] pressure    CARDIAC  [ ]Regular  [ ]Abnormal    PULMONARY  [ ]Bilateral Clear Breath Sounds  [ ]Normal Excursion  [ ]Abnormal    GI  [ ]PEG      [ ] +BS		              [ ]Soft, nondistended, nontender	  [ ]Abnormal    MUSCULOSKELETAL                                   [ ]Bedbound                 [ ]Abnormal    [ ]Ambulatory/OOB to chair                           EXTREMITIES                                         [ ]Normal  [ ]Edema                           NEUROLOGIC  [ ] Normal, non focal  [ ] Focal findings:    PSYCHIATRIC  [ ]Alert and appropriate  [ ] Sedated	 [ ]Agitated    :  [place]: [ ] Yes, if yes: Date of Placement:                   [  ] No    LINES: Central Lines [ ] Yes, if yes: Date of Placement                                     [  ] No    HOSPITAL MEDICATIONS:  MEDICATIONS  (STANDING):  albuterol/ipratropium for Nebulization 3 milliLiter(s) Nebulizer every 6 hours  apixaban 5 milliGRAM(s) Enteral Tube every 12 hours  atorvastatin 20 milliGRAM(s) Oral at bedtime  chlorhexidine 2% Cloths 1 Application(s) Topical <User Schedule>  cholecalciferol 1000 Unit(s) Oral daily  epoetin krystyna-epbx (RETACRIT) Injectable 41631 Unit(s) SubCutaneous <User Schedule>  escitalopram 15 milliGRAM(s) Oral with breakfast  gabapentin Solution 150 milliGRAM(s) Oral every 12 hours  influenza  Vaccine (HIGH DOSE) 0.5 milliLiter(s) IntraMuscular once  lacosamide Solution 200 milliGRAM(s) Oral two times a day  lamoTRIgine 50 milliGRAM(s) Oral two times a day  lanolin Ointment 1 Application(s) Topical daily  levETIRAcetam  Solution 750 milliGRAM(s) Oral two times a day  lurasidone 20 milliGRAM(s) Oral at bedtime  metoprolol tartrate 25 milliGRAM(s) Oral every 12 hours  nystatin Powder 1 Application(s) Topical every 8 hours  pantoprazole   Suspension 40 milliGRAM(s) Oral daily  polyethylene glycol 3350 17 Gram(s) Oral daily    MEDICATIONS  (PRN):  acetaminophen     Tablet .. 650 milliGRAM(s) Oral every 6 hours PRN Temp greater or equal to 38C (100.4F), Mild Pain (1 - 3)  aluminum hydroxide/magnesium hydroxide/simethicone Suspension 30 milliLiter(s) Oral every 4 hours PRN Dyspepsia  artificial tears (preservative free) Ophthalmic Solution 1 Drop(s) Both EYES every 6 hours PRN Dry Eyes  melatonin 3 milliGRAM(s) Oral at bedtime PRN Insomnia  ondansetron Injectable 4 milliGRAM(s) IV Push every 8 hours PRN Nausea and/or Vomiting      LABS:                        8.6    7.95  )-----------( 176      ( 21 Mar 2025 07:00 )             27.7     03-21    141  |  104  |  24[H]  ----------------------------<  108[H]  4.1   |  28  |  1.42[H]    Ca    9.4      21 Mar 2025 07:00  Phos  2.8     03-21  Mg     1.9     03-21   Patient is a 69y old  Male who presents with a chief complaint of Acute on chronic hypoxemic respiratory failure (22 Mar 2025 05:45)    HPI:  69 year old male with a past medical history of hypertension, hyperlipemia VAZQUEZ (on CPAP at bedtime, NC 2 liters during the day), CKD stage 3, epilepsy, schizophrenia, developmental delay, metastatic testicular cancer (s/p orchiectomy, actively on chemotherapy, last dose of etoposide/cisplatin on 6/2024) presenting with worsening shortness of breath.   Patient was recently hospitalized at [facility] from January 27th 2025 to February 6th 2025 for seizure and influenza virus infection, which required intubation. He was sent to rehab (originally from home) from hospital.   He returns due to sudden onset shortness of breath and worsening oxygenation. Of note, he tested positive for COVID-19 at facility on 2/13/25 and was not put on any COVID-19 treatment at the time, only guaifenesin and scopolamine patch. Patient was placed on non-rebreather and sent to the hospital on 2/16/25.   In the ER, vital signs significant for T-max 101.9F, HR 84-90. WBC 6.8. Hemoglobin 9.1. Platelet 87. AST 70. ALT 48. Cr 3.27. BUN 45. pH 7.30, pCO2 48. UA negative for infection. Patient given vancomycin, Zosyn, albuterol, and  cc bolus. Patient admitted to the general medicine service for further care.   Patient evaluated at bedside during admission. Unable to talk with the patient given that he is on BIPAP. Attempted to take the patient off BIPAP while in the room, but patient's work of breathing immediately began to increase and patient endorsed shortness of breath, so he was placed back on. History taken by chart review.  (16 Feb 2025 13:16)    Interval Events: No events overnight    REVIEW OF SYSTEMS:  [x ] Positive; complaining of headache  [ ] All other systems negative  [ ] Unable to assess ROS because ________    Vital Signs Last 24 Hrs  T(C): 36.5 (03-22-25 @ 04:30), Max: 36.9 (03-21-25 @ 17:54)  T(F): 97.7 (03-22-25 @ 04:30), Max: 98.5 (03-21-25 @ 17:54)  HR: 68 (03-22-25 @ 06:48) (68 - 112)  BP: 168/93 (03-22-25 @ 04:30) (145/87 - 168/93)  RR: 20 (03-22-25 @ 04:30) (16 - 20)  SpO2: 100% (03-22-25 @ 06:48) (90% - 100%)    PHYSICAL EXAM:  HEENT:   [X]Tracheostomy: #7 cuffless Portex   []Pupils equal  [ ]No oral lesions  [ ]Abnormal    SKIN  [ ]No Rash  [X] Abnormal: macerated skin noted on perineum, buttocks, and inner thighs; Lt occipital scalp injury.  [ ] pressure    CARDIAC  [X]Regular  [ ]Abnormal    PULMONARY  [X]Bilateral Clear Breath Sounds  [ ]Normal Excursion  [ ]Abnormal    GI  [X]PEG site c/d/i      [X] +BS		              [X]Soft, nondistended, nontender	  [ ]Abnormal    MUSCULOSKELETAL                                   [X]Bedbound                 [ ]Abnormal    [ ]Ambulatory/OOB to chair                           EXTREMITIES                                         [X]Normal  [ ]Edema                           NEUROLOGIC  [X] Normal, non focal  [ ] Focal findings:    PSYCHIATRIC  [X]Alert and appropriate Mood   [ ] Sedated	 [ ]Agitated    :  [place]: [ ] Yes, if yes: Date of Placement:                   [X] No    LINES: Central Lines [ ] Yes, if yes: Date of Placement                                     [X] No    HOSPITAL MEDICATIONS:  MEDICATIONS  (STANDING):  albuterol/ipratropium for Nebulization 3 milliLiter(s) Nebulizer every 6 hours  apixaban 5 milliGRAM(s) Enteral Tube every 12 hours  atorvastatin 20 milliGRAM(s) Oral at bedtime  chlorhexidine 2% Cloths 1 Application(s) Topical <User Schedule>  cholecalciferol 1000 Unit(s) Oral daily  epoetin krystyna-epbx (RETACRIT) Injectable 59927 Unit(s) SubCutaneous <User Schedule>  escitalopram 15 milliGRAM(s) Oral with breakfast  gabapentin Solution 150 milliGRAM(s) Oral every 12 hours  influenza  Vaccine (HIGH DOSE) 0.5 milliLiter(s) IntraMuscular once  lacosamide Solution 200 milliGRAM(s) Oral two times a day  lamoTRIgine 50 milliGRAM(s) Oral two times a day  lanolin Ointment 1 Application(s) Topical daily  levETIRAcetam  Solution 750 milliGRAM(s) Oral two times a day  lurasidone 20 milliGRAM(s) Oral at bedtime  metoprolol tartrate 25 milliGRAM(s) Oral every 12 hours  nystatin Powder 1 Application(s) Topical every 8 hours  pantoprazole   Suspension 40 milliGRAM(s) Oral daily  polyethylene glycol 3350 17 Gram(s) Oral daily    MEDICATIONS  (PRN):  acetaminophen     Tablet .. 650 milliGRAM(s) Oral every 6 hours PRN Temp greater or equal to 38C (100.4F), Mild Pain (1 - 3)  aluminum hydroxide/magnesium hydroxide/simethicone Suspension 30 milliLiter(s) Oral every 4 hours PRN Dyspepsia  artificial tears (preservative free) Ophthalmic Solution 1 Drop(s) Both EYES every 6 hours PRN Dry Eyes  melatonin 3 milliGRAM(s) Oral at bedtime PRN Insomnia  ondansetron Injectable 4 milliGRAM(s) IV Push every 8 hours PRN Nausea and/or Vomiting      LABS:                        8.6    7.95  )-----------( 176      ( 21 Mar 2025 07:00 )             27.7     03-21    141  |  104  |  24[H]  ----------------------------<  108[H]  4.1   |  28  |  1.42[H]    Ca    9.4      21 Mar 2025 07:00  Phos  2.8     03-21  Mg     1.9     03-21

## 2025-03-22 NOTE — PROGRESS NOTE ADULT - PROBLEM SELECTOR PLAN 9
Please tell the patient her A1C is 9.9. Please ask her if she would be willing to take insulin with each meal. If so I would prescribe Novolog pen and she would take 3 units with each meal to start.    Please check that she is still taking Lantus 35 units every evening.    Dr. Vasquez   - DVT PPX: Cont Eliquis  - PUD PPX: Cont PPI

## 2025-03-22 NOTE — PROGRESS NOTE ADULT - SUBJECTIVE AND OBJECTIVE BOX
DATE OF SERVICE: 03-22-25 @ 13:59    Patient is a 69y old  Male who presents with a chief complaint of Acute on chronic hypoxemic respiratory failure (22 Mar 2025 07:21)      INTERVAL HISTORY: NAD     REVIEW OF SYSTEMS:  CONSTITUTIONAL: No weakness  EYES/ENT: No visual changes;  No throat pain   NECK: No pain or stiffness  RESPIRATORY: No cough, wheezing; No shortness of breath  CARDIOVASCULAR: No chest pain or palpitations  GASTROINTESTINAL: No abdominal  pain. No nausea, vomiting, or hematemesis  GENITOURINARY: No dysuria, frequency or hematuria  NEUROLOGICAL: No stroke like symptoms  SKIN: No rashes    	  MEDICATIONS:  metoprolol tartrate 25 milliGRAM(s) Oral every 12 hours        PHYSICAL EXAM:  T(C): 36.4 (03-22-25 @ 11:32), Max: 36.9 (03-21-25 @ 17:54)  HR: 91 (03-22-25 @ 11:32) (68 - 112)  BP: 151/83 (03-22-25 @ 11:32) (145/87 - 168/93)  RR: 20 (03-22-25 @ 11:32) (16 - 20)  SpO2: 100% (03-22-25 @ 11:32) (90% - 100%)  Wt(kg): --  I&O's Summary    21 Mar 2025 07:01  -  22 Mar 2025 07:00  --------------------------------------------------------  IN: 0 mL / OUT: 550 mL / NET: -550 mL          Appearance: In no distress	  HEENT:    PERRL, EOMI	  Cardiovascular:  S1 S2, No JVD  Respiratory: Lungs clear to auscultation	  Gastrointestinal:  Soft, Non-tender, + BS	  Vascularature:  No edema of LE  Psychiatric: Appropriate affect   Neuro: no acute focal deficits                               9.2    7.19  )-----------( 183      ( 22 Mar 2025 07:01 )             30.0     03-22    141  |  103  |  24[H]  ----------------------------<  121[H]  4.3   |  29  |  1.48[H]    Ca    9.4      22 Mar 2025 07:01  Phos  3.7     03-22  Mg     1.8     03-22          Labs personally reviewed      ASSESSMENT/PLAN: 	        ASSESSMENT/PLAN: 	    69 year old male with a past medical history of hypertension, hyperlipemia VAZQUEZ (on CPAP at bedtime, NC 2 liters during the day), CKD stage 3, epilepsy, schizophrenia, developmental delay, metastatic testicular cancer (s/p orchiectomy, actively on chemotherapy, last dose of etoposide/cisplatin on 6/2024), presenting with acute on chronic hypoxemic respiratory failure, secondary to (a) COVID-19 infection, (b) superimposed pneumonia after recent influenza infection, and/or (c) fluid overload.     Problem/Plan - 1:  ·  Problem: Acute on chronic respiratory failure with hypoxia.   ·  Plan: Likely 2/2 PNA, HF  - Appreciate pulmonology.  - s/p trach 3/12  - Transferred to RCU    Problem/Plan - 2:  ·  Problem: SHAGUFTA (acute kidney injury).   ·  Plan: Has a history of CKD stage 3, Cr 2.38 at baseline.    - Nephrology following  - BUN now improved following de-escalation of diuretics    Problem/Plan - 3:  ·  Problem: Chronic heart failure with preserved ejection fraction.   ·  Plan: TTE done here 1/17/25 technically difficult, but LV systolic fxn appears nl without any regional wall motion abnormalities  - Euvolemic, repeat BNP. On 2/23 2300  - TTE 3/17 1. No obvious Left atrial thrombus; unable to evaluate left atrial appendage on TTE 2. Compared to the transthoracic echocardiogram performed on 2/23/2025, LA difficult to compare.    Problem/Plan - 4:  ·  Problem: New onset Afib RVR (on tele, confirmed with EKG 2/19)   ·  Plan:  - Cont Metoprolol 25mg BID  - Cont Eliquis 5mg BID      Problem/Plan - 5:  ·  Problem: HTN    ·  Plan: Resolved  - 3/19 slightly above target. Will cont to monitor.           TUAN Cristina DO [facility]  Cardiovascular Medicine  [address], [address]  Office: [phone]  Available via call/text on [employer] Teams

## 2025-03-22 NOTE — DISCHARGE NOTE PROVIDER - DETAILS OF MALNUTRITION DIAGNOSIS/DIAGNOSES
This patient has been assessed with a concern for Malnutrition and was treated during this hospitalization for the following Nutrition diagnosis/diagnoses:     -  02/24/2025: Severe protein-calorie malnutrition

## 2025-03-23 LAB
ANION GAP SERPL CALC-SCNC: 12 MMOL/L — SIGNIFICANT CHANGE UP (ref 5–17)
BUN SERPL-MCNC: 24 MG/DL — HIGH (ref 7–23)
CALCIUM SERPL-MCNC: 9.2 MG/DL — SIGNIFICANT CHANGE UP (ref 8.4–10.5)
CHLORIDE SERPL-SCNC: 103 MMOL/L — SIGNIFICANT CHANGE UP (ref 96–108)
CO2 SERPL-SCNC: 27 MMOL/L — SIGNIFICANT CHANGE UP (ref 22–31)
CREAT SERPL-MCNC: 1.43 MG/DL — HIGH (ref 0.5–1.3)
EGFR: 54 ML/MIN/1.73M2 — LOW
EGFR: 54 ML/MIN/1.73M2 — LOW
GLUCOSE BLDC GLUCOMTR-MCNC: 117 MG/DL — HIGH (ref 70–99)
GLUCOSE SERPL-MCNC: 125 MG/DL — HIGH (ref 70–99)
HCT VFR BLD CALC: 28.4 % — LOW (ref 39–50)
HGB BLD-MCNC: 8.9 G/DL — LOW (ref 13–17)
MAGNESIUM SERPL-MCNC: 2 MG/DL — SIGNIFICANT CHANGE UP (ref 1.6–2.6)
MCHC RBC-ENTMCNC: 31.3 G/DL — LOW (ref 32–36)
MCHC RBC-ENTMCNC: 31.6 PG — SIGNIFICANT CHANGE UP (ref 27–34)
MCV RBC AUTO: 100.7 FL — HIGH (ref 80–100)
NRBC BLD AUTO-RTO: 0 /100 WBCS — SIGNIFICANT CHANGE UP (ref 0–0)
PHOSPHATE SERPL-MCNC: 3.7 MG/DL — SIGNIFICANT CHANGE UP (ref 2.5–4.5)
PLATELET # BLD AUTO: 174 K/UL — SIGNIFICANT CHANGE UP (ref 150–400)
POTASSIUM SERPL-MCNC: 4.4 MMOL/L — SIGNIFICANT CHANGE UP (ref 3.5–5.3)
POTASSIUM SERPL-SCNC: 4.4 MMOL/L — SIGNIFICANT CHANGE UP (ref 3.5–5.3)
RBC # BLD: 2.82 M/UL — LOW (ref 4.2–5.8)
RBC # FLD: 17.4 % — HIGH (ref 10.3–14.5)
SODIUM SERPL-SCNC: 142 MMOL/L — SIGNIFICANT CHANGE UP (ref 135–145)
WBC # BLD: 6.67 K/UL — SIGNIFICANT CHANGE UP (ref 3.8–10.5)
WBC # FLD AUTO: 6.67 K/UL — SIGNIFICANT CHANGE UP (ref 3.8–10.5)

## 2025-03-23 PROCEDURE — 99232 SBSQ HOSP IP/OBS MODERATE 35: CPT

## 2025-03-23 RX ADMIN — LAMOTRIGINE 50 MILLIGRAM(S): 150 TABLET ORAL at 18:14

## 2025-03-23 RX ADMIN — METOPROLOL SUCCINATE 25 MILLIGRAM(S): 50 TABLET, EXTENDED RELEASE ORAL at 18:15

## 2025-03-23 RX ADMIN — Medication 1 APPLICATION(S): at 05:45

## 2025-03-23 RX ADMIN — GABAPENTIN 150 MILLIGRAM(S): 400 CAPSULE ORAL at 18:14

## 2025-03-23 RX ADMIN — Medication 1 APPLICATION(S): at 12:00

## 2025-03-23 RX ADMIN — LEVETIRACETAM 750 MILLIGRAM(S): 10 INJECTION, SOLUTION INTRAVENOUS at 18:14

## 2025-03-23 RX ADMIN — ESCITALOPRAM OXALATE 15 MILLIGRAM(S): 20 TABLET ORAL at 05:44

## 2025-03-23 RX ADMIN — METOPROLOL SUCCINATE 25 MILLIGRAM(S): 50 TABLET, EXTENDED RELEASE ORAL at 05:44

## 2025-03-23 RX ADMIN — APIXABAN 5 MILLIGRAM(S): 2.5 TABLET, FILM COATED ORAL at 18:15

## 2025-03-23 RX ADMIN — LURASIDONE HYDROCHLORIDE 20 MILLIGRAM(S): 120 TABLET, FILM COATED ORAL at 21:03

## 2025-03-23 RX ADMIN — LACOSAMIDE 200 MILLIGRAM(S): 150 TABLET, FILM COATED ORAL at 05:42

## 2025-03-23 RX ADMIN — IPRATROPIUM BROMIDE AND ALBUTEROL SULFATE 3 MILLILITER(S): .5; 2.5 SOLUTION RESPIRATORY (INHALATION) at 23:12

## 2025-03-23 RX ADMIN — LAMOTRIGINE 50 MILLIGRAM(S): 150 TABLET ORAL at 05:45

## 2025-03-23 RX ADMIN — Medication 40 MILLIGRAM(S): at 11:57

## 2025-03-23 RX ADMIN — NYSTATIN 1 APPLICATION(S): 100000 CREAM TOPICAL at 14:50

## 2025-03-23 RX ADMIN — IPRATROPIUM BROMIDE AND ALBUTEROL SULFATE 3 MILLILITER(S): .5; 2.5 SOLUTION RESPIRATORY (INHALATION) at 11:01

## 2025-03-23 RX ADMIN — NYSTATIN 1 APPLICATION(S): 100000 CREAM TOPICAL at 05:46

## 2025-03-23 RX ADMIN — LEVETIRACETAM 750 MILLIGRAM(S): 10 INJECTION, SOLUTION INTRAVENOUS at 05:43

## 2025-03-23 RX ADMIN — APIXABAN 5 MILLIGRAM(S): 2.5 TABLET, FILM COATED ORAL at 05:44

## 2025-03-23 RX ADMIN — NYSTATIN 1 APPLICATION(S): 100000 CREAM TOPICAL at 21:03

## 2025-03-23 RX ADMIN — Medication 1000 UNIT(S): at 11:57

## 2025-03-23 RX ADMIN — IPRATROPIUM BROMIDE AND ALBUTEROL SULFATE 3 MILLILITER(S): .5; 2.5 SOLUTION RESPIRATORY (INHALATION) at 00:15

## 2025-03-23 RX ADMIN — IPRATROPIUM BROMIDE AND ALBUTEROL SULFATE 3 MILLILITER(S): .5; 2.5 SOLUTION RESPIRATORY (INHALATION) at 17:18

## 2025-03-23 RX ADMIN — POLYETHYLENE GLYCOL 3350 17 GRAM(S): 17 POWDER, FOR SOLUTION ORAL at 11:58

## 2025-03-23 RX ADMIN — GABAPENTIN 150 MILLIGRAM(S): 400 CAPSULE ORAL at 05:43

## 2025-03-23 RX ADMIN — LACOSAMIDE 200 MILLIGRAM(S): 150 TABLET, FILM COATED ORAL at 18:14

## 2025-03-23 RX ADMIN — IPRATROPIUM BROMIDE AND ALBUTEROL SULFATE 3 MILLILITER(S): .5; 2.5 SOLUTION RESPIRATORY (INHALATION) at 05:32

## 2025-03-23 RX ADMIN — ATORVASTATIN CALCIUM 20 MILLIGRAM(S): 80 TABLET, FILM COATED ORAL at 21:03

## 2025-03-23 NOTE — PROGRESS NOTE ADULT - SUBJECTIVE AND OBJECTIVE BOX
[facility] HEMATOLOGY/[facility]Y INPATIENT PROGRESS NOTE     Interval Hx:   03-23-25: Mr. Gr was seen at bedside today.    Meds:   MEDICATIONS  (STANDING):  acetaminophen   IVPB .. 1000 milliGRAM(s) IV Intermittent once  albuterol/ipratropium for Nebulization 3 milliLiter(s) Nebulizer every 6 hours  apixaban 5 milliGRAM(s) Enteral Tube every 12 hours  atorvastatin 20 milliGRAM(s) Oral at bedtime  chlorhexidine 2% Cloths 1 Application(s) Topical <User Schedule>  cholecalciferol 1000 Unit(s) Oral daily  epoetin krystyna-epbx (RETACRIT) Injectable 97847 Unit(s) SubCutaneous <User Schedule>  escitalopram 15 milliGRAM(s) Oral with breakfast  gabapentin Solution 150 milliGRAM(s) Oral every 12 hours  influenza  Vaccine (HIGH DOSE) 0.5 milliLiter(s) IntraMuscular once  lacosamide Solution 200 milliGRAM(s) Oral two times a day  lamoTRIgine 50 milliGRAM(s) Oral two times a day  lanolin Ointment 1 Application(s) Topical daily  levETIRAcetam  Solution 750 milliGRAM(s) Oral two times a day  lurasidone 20 milliGRAM(s) Oral at bedtime  metoprolol tartrate 25 milliGRAM(s) Oral every 12 hours  nystatin Powder 1 Application(s) Topical every 8 hours  pantoprazole   Suspension 40 milliGRAM(s) Oral daily  polyethylene glycol 3350 17 Gram(s) Oral daily    MEDICATIONS  (PRN):  acetaminophen     Tablet .. 650 milliGRAM(s) Oral every 6 hours PRN Temp greater or equal to 38C (100.4F), Mild Pain (1 - 3)  aluminum hydroxide/magnesium hydroxide/simethicone Suspension 30 milliLiter(s) Oral every 4 hours PRN Dyspepsia  artificial tears (preservative free) Ophthalmic Solution 1 Drop(s) Both EYES every 6 hours PRN Dry Eyes  melatonin 3 milliGRAM(s) Oral at bedtime PRN Insomnia  ondansetron Injectable 4 milliGRAM(s) IV Push every 8 hours PRN Nausea and/or Vomiting    Vital Signs Last 24 Hrs  T(C): 36.2 (23 Mar 2025 04:55), Max: 37.1 (22 Mar 2025 17:20)  T(F): 97.1 (23 Mar 2025 04:55), Max: 98.8 (22 Mar 2025 17:20)  HR: 81 (23 Mar 2025 05:32) (68 - 92)  BP: 156/89 (23 Mar 2025 04:55) (127/77 - 156/89)  BP(mean): --  RR: 18 (23 Mar 2025 04:55) (18 - 20)  SpO2: 100% (23 Mar 2025 05:32) (95% - 100%)    Parameters below as of 23 Mar 2025 05:32  Patient On (Oxygen Delivery Method): tracheostomy collar    Physical Exam:  Gen: NAD, tracheostomy with ETT  HEENT: EOMI, MMM  Chest: equal chest rise  Cardiac: regular   Abd: non distended    Labs:                        9.2    7.19  )-----------( 183      ( 22 Mar 2025 07:01 )             30.0     CBC Full  -  ( 22 Mar 2025 07:01 )  WBC Count : 7.19 K/uL  RBC Count : 2.96 M/uL  Hemoglobin : 9.2 g/dL  Hematocrit : 30.0 %  Platelet Count - Automated : 183 K/uL  Mean Cell Volume : 101.4 fl  Mean Cell Hemoglobin : 31.1 pg  Mean Cell Hemoglobin Concentration : 30.7 g/dL    03-22    141  |  103  |  24[H]  ----------------------------<  121[H]  4.3   |  29  |  1.48[H]    Ca    9.4      22 Mar 2025 07:01  Phos  3.7     03-22  Mg     1.8     03-22         [facility]AT[facility] INPATIENT PROGRESS NOTE     Interval Hx:   03-23-25: Mr. Gr was seen at bedside today, no significant overnight events noted, discussed with overnight team, without signs of bleeding. Oxygen requirements stable, pulmonary recs noted. Labs and course reviewed     Meds:   MEDICATIONS  (STANDING):  acetaminophen   IVPB .. 1000 milliGRAM(s) IV Intermittent once  albuterol/ipratropium for Nebulization 3 milliLiter(s) Nebulizer every 6 hours  apixaban 5 milliGRAM(s) Enteral Tube every 12 hours  atorvastatin 20 milliGRAM(s) Oral at bedtime  chlorhexidine 2% Cloths 1 Application(s) Topical <User Schedule>  cholecalciferol 1000 Unit(s) Oral daily  epoetin krystyna-epbx (RETACRIT) Injectable 35790 Unit(s) SubCutaneous <User Schedule>  escitalopram 15 milliGRAM(s) Oral with breakfast  gabapentin Solution 150 milliGRAM(s) Oral every 12 hours  influenza  Vaccine (HIGH DOSE) 0.5 milliLiter(s) IntraMuscular once  lacosamide Solution 200 milliGRAM(s) Oral two times a day  lamoTRIgine 50 milliGRAM(s) Oral two times a day  lanolin Ointment 1 Application(s) Topical daily  levETIRAcetam  Solution 750 milliGRAM(s) Oral two times a day  lurasidone 20 milliGRAM(s) Oral at bedtime  metoprolol tartrate 25 milliGRAM(s) Oral every 12 hours  nystatin Powder 1 Application(s) Topical every 8 hours  pantoprazole   Suspension 40 milliGRAM(s) Oral daily  polyethylene glycol 3350 17 Gram(s) Oral daily    MEDICATIONS  (PRN):  acetaminophen     Tablet .. 650 milliGRAM(s) Oral every 6 hours PRN Temp greater or equal to 38C (100.4F), Mild Pain (1 - 3)  aluminum hydroxide/magnesium hydroxide/simethicone Suspension 30 milliLiter(s) Oral every 4 hours PRN Dyspepsia  artificial tears (preservative free) Ophthalmic Solution 1 Drop(s) Both EYES every 6 hours PRN Dry Eyes  melatonin 3 milliGRAM(s) Oral at bedtime PRN Insomnia  ondansetron Injectable 4 milliGRAM(s) IV Push every 8 hours PRN Nausea and/or Vomiting    Vital Signs Last 24 Hrs  T(C): 36.2 (23 Mar 2025 04:55), Max: 37.1 (22 Mar 2025 17:20)  T(F): 97.1 (23 Mar 2025 04:55), Max: 98.8 (22 Mar 2025 17:20)  HR: 81 (23 Mar 2025 05:32) (68 - 92)  BP: 156/89 (23 Mar 2025 04:55) (127/77 - 156/89)  BP(mean): --  RR: 18 (23 Mar 2025 04:55) (18 - 20)  SpO2: 100% (23 Mar 2025 05:32) (95% - 100%)    Parameters below as of 23 Mar 2025 05:32  Patient On (Oxygen Delivery Method): tracheostomy collar    Physical Exam:  Gen: NAD, tracheostomy with ETT  HEENT: EOMI, MMM  Chest: equal chest rise  Cardiac: regular   Abd: non distended    Labs:                        9.2    7.19  )-----------( 183      ( 22 Mar 2025 07:01 )             30.0     CBC Full  -  ( 22 Mar 2025 07:01 )  WBC Count : 7.19 K/uL  RBC Count : 2.96 M/uL  Hemoglobin : 9.2 g/dL  Hematocrit : 30.0 %  Platelet Count - Automated : 183 K/uL  Mean Cell Volume : 101.4 fl  Mean Cell Hemoglobin : 31.1 pg  Mean Cell Hemoglobin Concentration : 30.7 g/dL    03-22    141  |  103  |  24[H]  ----------------------------<  121[H]  4.3   |  29  |  1.48[H]    Ca    9.4      22 Mar 2025 07:01  Phos  3.7     03-22  Mg     1.8     03-22

## 2025-03-23 NOTE — PROGRESS NOTE ADULT - PROBLEM SELECTOR PLAN 10
- Full Code  - Contact: Sister Ester [phone]  - PMR follow up on Monday to determine ROCHELLE vs Acute Rehab upon discharge

## 2025-03-23 NOTE — PROGRESS NOTE ADULT - SUBJECTIVE AND OBJECTIVE BOX
DATE OF SERVICE: 03-23-25 @ 15:12    Patient is a 69y old  Male who presents with a chief complaint of Acute on chronic hypoxemic respiratory failure (23 Mar 2025 06:55)      INTERVAL HISTORY: NAD     REVIEW OF SYSTEMS:  CONSTITUTIONAL: No weakness  EYES/ENT: No visual changes;  No throat pain   NECK: No pain or stiffness  RESPIRATORY: No cough, wheezing; No shortness of breath  CARDIOVASCULAR: No chest pain or palpitations  GASTROINTESTINAL: No abdominal  pain. No nausea, vomiting, or hematemesis  GENITOURINARY: No dysuria, frequency or hematuria  NEUROLOGICAL: No stroke like symptoms  SKIN: No rashes      	  MEDICATIONS:  metoprolol tartrate 25 milliGRAM(s) Oral every 12 hours        PHYSICAL EXAM:  T(C): 36.4 (03-23-25 @ 11:37), Max: 37.1 (03-22-25 @ 17:20)  HR: 84 (03-23-25 @ 15:07) (76 - 86)  BP: 161/86 (03-23-25 @ 11:37) (127/77 - 161/86)  RR: 18 (03-23-25 @ 15:07) (18 - 18)  SpO2: 100% (03-23-25 @ 15:07) (95% - 100%)  Wt(kg): --  I&O's Summary    22 Mar 2025 07:01  -  23 Mar 2025 07:00  --------------------------------------------------------  IN: 1650 mL / OUT: 975 mL / NET: 675 mL    23 Mar 2025 07:01  -  23 Mar 2025 15:12  --------------------------------------------------------  IN: 0 mL / OUT: 200 mL / NET: -200 mL          Appearance: In no distress	  HEENT:    PERRL, EOMI	  Cardiovascular:  S1 S2, No JVD  Respiratory: Lungs clear to auscultation	  Gastrointestinal:  Soft, Non-tender, + BS	  Vascularature:  No edema of LE  Psychiatric: Appropriate affect   Neuro: no acute focal deficits                               8.9    6.67  )-----------( 174      ( 23 Mar 2025 07:26 )             28.4     03-23    142  |  103  |  24[H]  ----------------------------<  125[H]  4.4   |  27  |  1.43[H]    Ca    9.2      23 Mar 2025 07:25  Phos  3.7     03-23  Mg     2.0     03-23          Labs personally reviewed      ASSESSMENT/PLAN: 	    69 year old male with a past medical history of hypertension, hyperlipemia VAZQUEZ (on CPAP at bedtime, NC 2 liters during the day), CKD stage 3, epilepsy, schizophrenia, developmental delay, metastatic testicular cancer (s/p orchiectomy, actively on chemotherapy, last dose of etoposide/cisplatin on 6/2024), presenting with acute on chronic hypoxemic respiratory failure, secondary to (a) COVID-19 infection, (b) superimposed pneumonia after recent influenza infection, and/or (c) fluid overload.     Problem/Plan - 1:  ·  Problem: Acute on chronic respiratory failure with hypoxia.   ·  Plan: Likely 2/2 PNA, HF  - Appreciate pulmonology.  - s/p trach 3/12  - Transferred to [facility]    Problem/Plan - 2:  ·  Problem: SHAGUFTA (acute kidney injury).   ·  Plan: Has a history of CKD stage 3, Cr 2.38 at baseline.    - Nephrology following  - BUN now improved following de-escalation of diuretics    Problem/Plan - 3:  ·  Problem: Chronic heart failure with preserved ejection fraction.   ·  Plan: TTE done here 1/17/25 technically difficult, but LV systolic fxn appears nl without any regional wall motion abnormalities  - Euvolemic, repeat BNP. On 2/23 2300  - TTE 3/17 1. No obvious Left atrial thrombus; unable to evaluate left atrial appendage on TTE 2. Compared to the transthoracic echocardiogram performed on 2/23/2025, LA difficult to compare.    Problem/Plan - 4:  ·  Problem: New onset Afib RVR (on tele, confirmed with EKG 2/19)   ·  Plan:  - Cont Metoprolol 25mg BID  - Cont Eliquis 5mg BID      Problem/Plan - 5:  ·  Problem: HTN    ·  Plan: Resolved  - 3/19 slightly above target. Will cont to monitor.         TUAN Cristina DO [facility]  Cardiovascular Medicine  [address] [place] [address], [place] [address]  Office: [phone]  Available via call/text on [employer] Teams

## 2025-03-23 NOTE — PROGRESS NOTE ADULT - PROBLEM SELECTOR PLAN 8
- SHAGUFTA on CKD stage 3 suspected in setting of new infection Covid 19   - CR peaked to 4.34 on 3/3, now improving   - Renvela d/cd Hyperphosphatemia resolved   - Continue Retacrit [id number] U tiw sq

## 2025-03-23 NOTE — PROGRESS NOTE ADULT - ASSESSMENT
Mr. Gr is a 69 year old male with PMHx of seizure disorder, sleep apnea, HTN, chronic idiopathic constipation, stage III CKD, severe obesity, secondary hyperparathyroidism, anemia, thrombocytopenia, hyperlipidemia, testicular cancer under treatment with Dr. Ovalles who is admitted for acute hypoxic respiratory failure secondary to COVID vs superimposed pneumonia with new onset thrombocytopenia. He was recently discharged 02/06/2025 after a tenous stay including intubation in the ICU for respiratory failure in setting of seizure. He had recovered and was discharged to rehab.      Former smoker, quit 14y prior, denied ETOH, drug use. Lives at home. Does not have children. Denies family history of blood disorders or malignancy.  Denies previous workplace related exposures.  Denies previous history of blood transfusions.  Denied history of thromboses.  Denied history of  requiring anticoagulation.     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  Margins were negative.  pT3Nx. CT C/A/P with few left para-aortic and left iliac chain lymph nodes largest measuring 8.1 cm left para-aortic node, bilateral subcentimeter noncalcified pulmonary nodules measuring up to 7 mm. AFP, LDH, hCG were all elevated. 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 Started on adjuvant therapy with Cisplatin+Etoposide, so far completed 4 cycles of treatment with cisplatin dose reduced 50% and Etoposide 50% due to thrombocytopenia.      02/19/2025: on HFNC, noted tachycardia and fever, Klebsiella in urine as well, thrombocytopenia stable/improving, no signs of active bleeding     02/20/2025: developed A.fib with RVR and was placed on heparin drip and pending cardiology eval    02/21/2025: awake, on AVAPS overnight, noted RRT for hypoxia as pt removed HFNC at some point in time, good response thereafter     02/22/2025:  continues on AVAPS this morning, noted RRT overnight for hypoxia, hypercapnia, ICU following, US LE negative for DVT, counts overall stable/improved     02/23/2025: progressive respiratory failure, noted ongoing RTT this morning with pending intubation and transfer to MICU     02/24/2025: intubated 02/23/2025, sedated, on pressor support, no signs of bleeding, counts noted, no signs of bleeding     02/25/2025: continues in MICU, intubated and sedated, no signs of bleeding    02/26/2025: continues in MICU, intubated, without signs of bleeding, continues on heparin drip     02/27/2025:  remains under the care of the ICU, intubated, no signs of bleeding and continues on heparin drip, counts stable, has not required PRBC support this admission    02/28/2025: continues under the care of MICU, continues intubated, no signs of bleeding, on heparin drip    03/01/2025: continues in MICU, intubated, direct josefina IgG positive, eluate negative, not likely to be clinically significant     03/02/2025:  continues in MICU, nursing staff at bedside, no overnight events noted. CTH without acute intracranial pathology     03/03/2025: continues in MICU, intubated, on heparin drip, 1u PRBC overnight, no signs of bleeding, platelet count stable     03/04/2025: awake, moving arms spontaneously on exam, continues without much interaction however. No signs of bleeding, continues heparin drip, continues intubated in MICU     03/05/2025: extubated 03/04/2025, continues in MICU, awake and alert this morning and able to speak full sentences, discussed/reviewed findings, continues on heparin drip     03/06/2025: nursing staff at bedside, bipap overnight, without signs of bleeding, counts noted stable, renal function improving     03/07/2025:  no signs of bleeding, counts noted, continues heparin drip, continues in MICU    03/08/2025: on HFNC, no signs of bleeding, although alert and answering questions, not back to his baseline yet, continues in MICU    03/09/2025: continues in MICU, s/p re-intubation 03/08/2025 given respiratory compromise in the setting of copious secretions as evidenced by bronchoscopy, in setting of fever, now sedated, mild crusting of blood around mouth, without active sign of bleeding, counts noted reviewed, continues on heparin drip     03/10/2025: continues in MICU, no signs of bleeding, discussed with nursing staff at bedside, receiving 1u PRBC due to H/H downtrend, sputum culture with Pseudomonas, ICU and ID recs noted, continues to be intubated     03/11/2025: continues in MICU, intubated and sedated, noted counts, without signs of bleeding, appropriate response to transfusion    03/12/2025: continues in MICU, intubated and sedated, without signs of bleeding     03/13/2025: continues in MICU, s/p Tracheostomy 03/12/2025 with tracheal intubation, continues with sedation     03/14/2025:  awake and alert, mental status much improved, transferred from MICU to [address] [place] [address] 03/13/2025. Without significant events overnight, discussed with nursing staff at bedside, stated pt did well overnight, no signs of bleeding, no fever noted, medications provided via NGT and he is pending PEG-Tube placement today. His case is complex, noted with repeat need for intubations, discussed with pt, he is aware. Discussed with pts primary Oncologist as well.    03/15/2025: awake and alert, no overnight events noted, discussed with nursing staff, he did well overnight, no fevers, no signs of bleeding endorsed. He nods to information. Counts reviewed overall stable, discussed with him as well. Had PEG placed 03/14/2025 03/16/2025: no overnight events noted, comfortable overnight, nursing staff at bedside, stated pt did well, no signs of bleeding, had a BM. No fever reported. Counts reviewed, stable at this time     03/17/2025: no overnight events noted however continues with need for suctioning due to increase secretions, discussed with overnight staff, mild bleeding likely due to trauma from suctioning and recent tracheostomy placement without evidence of active bleeding otherwise. No fevers noted overnight. Had multiple smaller BMs, without evidence of bleeding, Nephrology recs and reviewed noted for Epo    03/18/2025: without significant overnight events, discussed with team at bedside, no evidence of bleeding, noted cardiology recs to start Eliquis 5mg BID for A.fib, currently on Lovenox, previously did require PRBC this admission but has since been stable, renal function seems to be improving, and Plt count improved, ok for AC per cardiology recs with Eliquis, monitor H/H closely     03/19/2025: comfortable, no overnight events noted, discussed with team, had 1 BM overnight without evidence of bleeding, now on Eliquis 5mg BID per cardiology recs for A.fib, renal function stable. Oxygen requirements stable overnight. No fevers reported. Pts primary Oncologist, Dr. Ovalles, aware, case discussed     03/20/2025: without significant events noted, continues on Eliquis, therapeutic, without signs of bleeding, continues on Epo, counts and imaging reviewed, no overnight events noted, continues with tracheostomy without increased oxygen requirements, increased secretions noted     03/21/2025: without significant overnight events noted. Discussed with overnight team, without evidence of bleeding, fever. Had a BM overnight, normal. Without increasing oxygen requirements, Without seizure like activity given hx of malignancy and cerebellar finding previously. Continues on Eliquis. Counts noted and reviewed, anemia, labile thrombocytopenia     03/22/2025:  no overnight events noted, discussed with overnight staff, pt did well, without increased oxygen requirements, without signs of bleeding. Had one BM overnight, without bleeding. Continues on Eliquis, Renal function is stable and appropriate. Counts and labs reviewed, overall stable however anemia persists.         #Acute hypoxic respiratory failure, Acute, Severe   # COVID  - CT noted with bilateral GGO  - ID following  - Pulmonary following  - Antibiotics per primary team/MICU and ID   - Intubated 02/23/2025 AM, MICU   - extubated 03/04/2025  - Pseudomonas from sputum culture   - Re-intubated 03/09/2025 due to increased work of breathing in setting of increased secretions, not protecting airway, tachypnea, hypoxia   - s/p Tracheostomy 03/12/2025    # Thrombocytopenia, Acute, Moderate   - Did occur during most recent admission and improved to normal prior to discharge   - Without signs of bleeding  - Could be multifactorial, possibly due to infection   - Continue to trend  - Transfuse to maintain Plt > 10k unless actively bleeding then maintain > 50k     # Brain lesion Hx of cancer, Acute, Severe   - pt with hx of seizures  - MRI brain now with 5.4 mm enhancing lesion in the LEFT middle cerebellar peduncle with associated edema suspicious for metastases  - MRI Lumbar negative for acute disease  - Small lesion without mass effect or edema, recommended to correlate per neurology if foci and locus are concordant with seizure activity  - Neurology recs noted, new lesion not likely to cause seizure  - It is rare, but nonetheless not impossible, for testicular cancer to be metastatic to the brain  - He will need another PET-CT, can be completed outpatient once stable if concern can proceed with biopsy at that time   - Previous endocrinology eval for thyroid nodule noted  - AFP/BHCH normal,  previously   - Repeat CTH without acute intracranial pathology     # 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 Ovalles, agree with current plan  - Follow up as outpatient with Franki Ovalles MD     # Acute kidney injury on CKD Stage IIIA, Acute, Moderate   - Likely multifactorial  - Nephrology consult and recs noted  - Now on therapeutic Eliquis, will need to be monitoring in high risk setting for bleeding   - Continue to trend     # Normocytic anemia, Acute, Severe   - Chronic, has hx of PRBC during chemo 07/2024  - Noted Anti E, Anti-K Anti-C antibodies previously  - direct josefina IgG positive, eluate negative, not likely to be clinically significant   - s/p 2u PRBC 03/03/2025 and 03/10/2025   - Monitor for bleeding  - Epo ordered  - Recommend to transfuse to maintain Hb > 7    # Klebsiella UTI, Acute, Severe > Moderate   # Pseudomonas UTI, Acute, Moderate   -Antibiotics per ID and primary team       Recommendations  - Trend CBC daily  - Transfuse to maintain Hb > 7   - Transfuse to maintain Plt >10k unless active bleeding then >50k   - Cardiology recs noted, now on Eliquis 5mg BID for A.fib, monitor H/H         Thank you for allowing me to participate in the care of Mr. Gr please do not hesitate to call or text me if you have further questions or concerns.     Brayan Mart MD  [facility]   Division of Hematology/Oncology  [address] [place] [address], [address]  [place] [id number]  P: [phone]  F: [phone]    Attestation:    ----Pt evaluated, >50min spent including face-to-face interaction in addition to chart review, reviewing treatment plan, discussing with care staff in hospital, his outside physician, and managing the patient’s chronic diagnoses as listed in the assessment----        Mr. Gr is a 69 year old male with PMHx of seizure disorder, sleep apnea, HTN, chronic idiopathic constipation, stage III CKD, severe obesity, secondary hyperparathyroidism, anemia, thrombocytopenia, hyperlipidemia, testicular cancer under treatment with Dr. Ovalles who is admitted for acute hypoxic respiratory failure secondary to COVID vs superimposed pneumonia with new onset thrombocytopenia. He was recently discharged 02/06/2025 after a tenous stay including intubation in the ICU for respiratory failure in setting of seizure. He had recovered and was discharged to rehab.      Former smoker, quit 14y prior, denied ETOH, drug use. Lives at home. Does not have children. Denies family history of blood disorders or malignancy.  Denies previous workplace related exposures.  Denies previous history of blood transfusions.  Denied history of thromboses.  Denied history of  requiring anticoagulation.     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  Margins were negative.  pT3Nx. CT C/A/P with few left para-aortic and left iliac chain lymph nodes largest measuring 8.1 cm left para-aortic node, bilateral subcentimeter noncalcified pulmonary nodules measuring up to 7 mm. AFP, LDH, hCG were all elevated. 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 Started on adjuvant therapy with Cisplatin+Etoposide, so far completed 4 cycles of treatment with cisplatin dose reduced 50% and Etoposide 50% due to thrombocytopenia.      02/19/2025: on HFNC, noted tachycardia and fever, Klebsiella in urine as well, thrombocytopenia stable/improving, no signs of active bleeding     02/20/2025: developed A.fib with RVR and was placed on heparin drip and pending cardiology eval    02/21/2025: awake, on AVAPS overnight, noted RRT for hypoxia as pt removed HFNC at some point in time, good response thereafter     02/22/2025:  continues on AVAPS this morning, noted RRT overnight for hypoxia, hypercapnia, ICU following, US LE negative for DVT, counts overall stable/improved     02/23/2025: progressive respiratory failure, noted ongoing RTT this morning with pending intubation and transfer to MICU     02/24/2025: intubated 02/23/2025, sedated, on pressor support, no signs of bleeding, counts noted, no signs of bleeding     02/25/2025: continues in MICU, intubated and sedated, no signs of bleeding    02/26/2025: continues in MICU, intubated, without signs of bleeding, continues on heparin drip     02/27/2025:  remains under the care of the ICU, intubated, no signs of bleeding and continues on heparin drip, counts stable, has not required PRBC support this admission    02/28/2025: continues under the care of MICU, continues intubated, no signs of bleeding, on heparin drip    03/01/2025: continues in MICU, intubated, direct josefina IgG positive, eluate negative, not likely to be clinically significant     03/02/2025:  continues in MICU, nursing staff at bedside, no overnight events noted. CTH without acute intracranial pathology     03/03/2025: continues in MICU, intubated, on heparin drip, 1u PRBC overnight, no signs of bleeding, platelet count stable     03/04/2025: awake, moving arms spontaneously on exam, continues without much interaction however. No signs of bleeding, continues heparin drip, continues intubated in MICU     03/05/2025: extubated 03/04/2025, continues in MICU, awake and alert this morning and able to speak full sentences, discussed/reviewed findings, continues on heparin drip     03/06/2025: nursing staff at bedside, bipap overnight, without signs of bleeding, counts noted stable, renal function improving     03/07/2025:  no signs of bleeding, counts noted, continues heparin drip, continues in MICU    03/08/2025: on HFNC, no signs of bleeding, although alert and answering questions, not back to his baseline yet, continues in MICU    03/09/2025: continues in MICU, s/p re-intubation 03/08/2025 given respiratory compromise in the setting of copious secretions as evidenced by bronchoscopy, in setting of fever, now sedated, mild crusting of blood around mouth, without active sign of bleeding, counts noted reviewed, continues on heparin drip     03/10/2025: continues in MICU, no signs of bleeding, discussed with nursing staff at bedside, receiving 1u PRBC due to H/H downtrend, sputum culture with Pseudomonas, ICU and ID recs noted, continues to be intubated     03/11/2025: continues in MICU, intubated and sedated, noted counts, without signs of bleeding, appropriate response to transfusion    03/12/2025: continues in MICU, intubated and sedated, without signs of bleeding     03/13/2025: continues in MICU, s/p Tracheostomy 03/12/2025 with tracheal intubation, continues with sedation     03/14/2025:  awake and alert, mental status much improved, transferred from MICU to [address] [place] [address] 03/13/2025. Without significant events overnight, discussed with nursing staff at bedside, stated pt did well overnight, no signs of bleeding, no fever noted, medications provided via NGT and he is pending PEG-Tube placement today. His case is complex, noted with repeat need for intubations, discussed with pt, he is aware. Discussed with pts primary Oncologist as well.    03/15/2025: awake and alert, no overnight events noted, discussed with nursing staff, he did well overnight, no fevers, no signs of bleeding endorsed. He nods to information. Counts reviewed overall stable, discussed with him as well. Had PEG placed 03/14/2025 03/16/2025: no overnight events noted, comfortable overnight, nursing staff at bedside, stated pt did well, no signs of bleeding, had a BM. No fever reported. Counts reviewed, stable at this time     03/17/2025: no overnight events noted however continues with need for suctioning due to increase secretions, discussed with overnight staff, mild bleeding likely due to trauma from suctioning and recent tracheostomy placement without evidence of active bleeding otherwise. No fevers noted overnight. Had multiple smaller BMs, without evidence of bleeding, Nephrology recs and reviewed noted for Epo    03/18/2025: without significant overnight events, discussed with team at bedside, no evidence of bleeding, noted cardiology recs to start Eliquis 5mg BID for A.fib, currently on Lovenox, previously did require PRBC this admission but has since been stable, renal function seems to be improving, and Plt count improved, ok for AC per cardiology recs with Eliquis, monitor H/H closely     03/19/2025: comfortable, no overnight events noted, discussed with team, had 1 BM overnight without evidence of bleeding, now on Eliquis 5mg BID per cardiology recs for A.fib, renal function stable. Oxygen requirements stable overnight. No fevers reported. Pts primary Oncologist, Dr. Ovalles, aware, case discussed     03/20/2025: without significant events noted, continues on Eliquis, therapeutic, without signs of bleeding, continues on Epo, counts and imaging reviewed, no overnight events noted, continues with tracheostomy without increased oxygen requirements, increased secretions noted     03/21/2025: without significant overnight events noted. Discussed with overnight team, without evidence of bleeding, fever. Had a BM overnight, normal. Without increasing oxygen requirements, Without seizure like activity given hx of malignancy and cerebellar finding previously. Continues on Eliquis. Counts noted and reviewed, anemia, labile thrombocytopenia     03/22/2025:  no overnight events noted, discussed with overnight staff, pt did well, without increased oxygen requirements, without signs of bleeding. Had one BM overnight, without bleeding. Continues on Eliquis, Renal function is stable and appropriate. Counts and labs reviewed, overall stable however anemia persists.     03/23/2025: no significant overnight events noted, discussed with overnight team, without signs of bleeding. Oxygen requirements stable, pulmonary recs noted. Labs and course reviewed       #Acute hypoxic respiratory failure, Acute, Severe   # COVID  - CT noted with bilateral GGO  - ID following  - Pulmonary following  - Antibiotics per primary team/MICU and ID   - Intubated 02/23/2025 AM, MICU   - extubated 03/04/2025  - Pseudomonas from sputum culture   - Re-intubated 03/09/2025 due to increased work of breathing in setting of increased secretions, not protecting airway, tachypnea, hypoxia   - s/p Tracheostomy 03/12/2025    # Thrombocytopenia, Acute, Moderate   - Did occur during most recent admission and improved to normal prior to discharge   - Without signs of bleeding  - Could be multifactorial, possibly due to infection   - Continue to trend  - Transfuse to maintain Plt > 10k unless actively bleeding then maintain > 50k     # Brain lesion Hx of cancer, Acute, Severe   - pt with hx of seizures  - MRI brain now with 5.4 mm enhancing lesion in the LEFT middle cerebellar peduncle with associated edema suspicious for metastases  - MRI Lumbar negative for acute disease  - Small lesion without mass effect or edema, recommended to correlate per neurology if foci and locus are concordant with seizure activity  - Neurology recs noted, new lesion not likely to cause seizure  - It is rare, but nonetheless not impossible, for testicular cancer to be metastatic to the brain  - He will need another PET-CT, can be completed outpatient once stable if concern can proceed with biopsy at that time   - Previous endocrinology eval for thyroid nodule noted  - AFP/BHCH normal,  previously   - Repeat CTH without acute intracranial pathology     # 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 Ovalles, agree with current plan  - Follow up as outpatient with Franki Ovalles MD     # Acute kidney injury on CKD Stage IIIA, Acute, Moderate   - Likely multifactorial  - Nephrology consult and recs noted  - Now on therapeutic Eliquis, will need to be monitoring in high risk setting for bleeding   - Continue to trend     # Normocytic anemia, Acute, Severe   - Chronic, has hx of PRBC during chemo 07/2024  - Noted Anti E, Anti-K Anti-C antibodies previously  - direct josefina IgG positive, eluate negative, not likely to be clinically significant   - s/p 2u PRBC 03/03/2025 and 03/10/2025   - Monitor for bleeding  - Epo ordered  - Recommend to transfuse to maintain Hb > 7    # Klebsiella UTI, Acute, Severe > Moderate   # Pseudomonas UTI, Acute, Moderate   -Antibiotics per ID and primary team       Recommendations  - Trend CBC daily  - Transfuse to maintain Hb > 7   - Transfuse to maintain Plt >10k unless active bleeding then >50k   - Cardiology recs noted, now on Eliquis 5mg BID for A.fib, monitor H/H         Thank you for allowing me to participate in the care of Mr. Gr please do not hesitate to call or text me if you have further questions or concerns.     Brayan Mart MD  [facility]   Division of Hematology/Oncology  [address] [place] [address], [address]  [place] [address]  P: [phone]  F: [phone]    Attestation:    ----Pt evaluated, >50min spent including face-to-face interaction in addition to chart review, reviewing treatment plan, discussing with care staff in hospital, his outside physician, and managing, reviewing orders, labs and additional time spent documenting, in addition to the patient’s chronic diagnoses as listed in the assessment----

## 2025-03-23 NOTE — PROGRESS NOTE ADULT - ASSESSMENT
69 year old male with a past medical history of hypertension, hyperlipemia VAZQUEZ (on CPAP at bedtime, NC 2 liters during the day), CKD stage 3, epilepsy, schizophrenia, developmental delay, metastatic testicular cancer (s/p orchiectomy, actively on chemotherapy, last dose of etoposide/cisplatin on 6/2024), presenting with acute on chronic hypoxemic respiratory failure, secondary to (a) COVID-19 infection, (b) superimposed pneumonia after recent influenza infection,  Transferred to MICU on 2/23 after RRT on floors due to hypoxia even with max setting AVAPS. Patient started on midodrine and weaned off of levo on 2/24. Failed pressure support on 2/25 and started diuresis with Bumex. CTH was unremarkable and weaned off of Precedex on 3/1. On 3/4 patient extubated and GOC conversations with family revealed that they would want patient to be trached. On 3/7, patient found to have RLL consolidation on POCUS and started on Zosyn, intubated on 3/8 due to increased lethargy and inability to clear oral secretions. Trach placed on 3/12. Transferred to RCU on 3/13. S/p Peg 3/14.  EGD with normal findings. Tolerating TC ATC since 3/16.   TTE performed 3/18 no obvious sign of left atrial appendage. Recommended by Cards and Cleared by heme for Eliquis 5mg BID. H/H stable. Tolerating PMV trials. Failed FEES 3/18. Trach downsized to a 7 Cuffless Portex on 3/20; Fio2 at 28%.       3/22: No events reported overnight. Patient continues to work with Speech for therapeutic swallowing exercises and PT for possible transfer to acute rehab. PMR to re-eval on Monday for final determination. MBS possibly for next week. 69 year old male with a past medical history of hypertension, hyperlipemia VAZQUEZ (on CPAP at bedtime, NC 2 liters during the day), CKD stage 3, epilepsy, schizophrenia, developmental delay, metastatic testicular cancer (s/p orchiectomy, actively on chemotherapy, last dose of etoposide/cisplatin on 6/2024), presenting with acute on chronic hypoxemic respiratory failure, secondary to (a) COVID-19 infection, (b) superimposed pneumonia after recent influenza infection,  Transferred to MICU on 2/23 after RRT on floors due to hypoxia even with max setting AVAPS. Patient started on midodrine and weaned off of levo on 2/24. Failed pressure support on 2/25 and started diuresis with Bumex. CTH was unremarkable and weaned off of Precedex on 3/1. On 3/4 patient extubated and GOC conversations with family revealed that they would want patient to be trached. On 3/7, patient found to have RLL consolidation on POCUS and started on Zosyn, intubated on 3/8 due to increased lethargy and inability to clear oral secretions. Trach placed on 3/12. Transferred to RCU on 3/13. S/p Peg 3/14.  EGD with normal findings. Tolerating TC ATC since 3/16.   TTE performed 3/18 no obvious sign of left atrial appendage. Recommended by Cards and Cleared by heme for Eliquis 5mg BID. H/H stable. Tolerating PMV trials. Failed FEES 3/18. Trach downsized to a 7 Cuffless Portex on 3/20; Fio2 at 28%.       3/22: No events reported overnight. Patient continues to work with Speech for therapeutic swallowing exercises and PT for possible transfer to acute rehab. PMR to re-eval on Monday for final determination. MBS possibly for next week.  3/23-no new issues

## 2025-03-23 NOTE — PROGRESS NOTE ADULT - SUBJECTIVE AND OBJECTIVE BOX
Patient is a 69y old  Male who presents with a chief complaint of Acute on chronic hypoxemic respiratory failure (23 Mar 2025 06:23)      Interval Events:    REVIEW OF SYSTEMS:  [ ] Positive  [ ] All other systems negative  [ ] Unable to assess ROS because ________    Vital Signs Last 24 Hrs  T(C): 36.2 (03-23-25 @ 04:55), Max: 37.1 (03-22-25 @ 17:20)  T(F): 97.1 (03-23-25 @ 04:55), Max: 98.8 (03-22-25 @ 17:20)  HR: 81 (03-23-25 @ 05:32) (72 - 92)  BP: 156/89 (03-23-25 @ 04:55) (127/77 - 156/89)  RR: 18 (03-23-25 @ 04:55) (18 - 20)  SpO2: 100% (03-23-25 @ 05:32) (95% - 100%)    PHYSICAL EXAM:  HEENT:   [ ]Tracheostomy:  [ ]Pupils equal  [ ]No oral lesions  [ ]Abnormal    SKIN  [ ]No Rash  [ ] Abnormal  [ ] pressure    CARDIAC  [ ]Regular  [ ]Abnormal    PULMONARY  [ ]Bilateral Clear Breath Sounds  [ ]Normal Excursion  [ ]Abnormal    GI  [ ]PEG      [ ] +BS		              [ ]Soft, nondistended, nontender	  [ ]Abnormal    MUSCULOSKELETAL                                   [ ]Bedbound                 [ ]Abnormal    [ ]Ambulatory/OOB to chair                           EXTREMITIES                                         [ ]Normal  [ ]Edema                           NEUROLOGIC  [ ] Normal, non focal  [ ] Focal findings:    PSYCHIATRIC  [ ]Alert and appropriate  [ ] Sedated	 [ ]Agitated    :  [place]: [ ] Yes, if yes: Date of Placement:                   [  ] No    LINES: Central Lines [ ] Yes, if yes: Date of Placement                                     [  ] No    HOSPITAL MEDICATIONS:  MEDICATIONS  (STANDING):  acetaminophen   IVPB .. 1000 milliGRAM(s) IV Intermittent once  albuterol/ipratropium for Nebulization 3 milliLiter(s) Nebulizer every 6 hours  apixaban 5 milliGRAM(s) Enteral Tube every 12 hours  atorvastatin 20 milliGRAM(s) Oral at bedtime  chlorhexidine 2% Cloths 1 Application(s) Topical <User Schedule>  cholecalciferol 1000 Unit(s) Oral daily  epoetin krystyna-epbx (RETACRIT) Injectable 08233 Unit(s) SubCutaneous <User Schedule>  escitalopram 15 milliGRAM(s) Oral with breakfast  gabapentin Solution 150 milliGRAM(s) Oral every 12 hours  influenza  Vaccine (HIGH DOSE) 0.5 milliLiter(s) IntraMuscular once  lacosamide Solution 200 milliGRAM(s) Oral two times a day  lamoTRIgine 50 milliGRAM(s) Oral two times a day  lanolin Ointment 1 Application(s) Topical daily  levETIRAcetam  Solution 750 milliGRAM(s) Oral two times a day  lurasidone 20 milliGRAM(s) Oral at bedtime  metoprolol tartrate 25 milliGRAM(s) Oral every 12 hours  nystatin Powder 1 Application(s) Topical every 8 hours  pantoprazole   Suspension 40 milliGRAM(s) Oral daily  polyethylene glycol 3350 17 Gram(s) Oral daily    MEDICATIONS  (PRN):  acetaminophen     Tablet .. 650 milliGRAM(s) Oral every 6 hours PRN Temp greater or equal to 38C (100.4F), Mild Pain (1 - 3)  aluminum hydroxide/magnesium hydroxide/simethicone Suspension 30 milliLiter(s) Oral every 4 hours PRN Dyspepsia  artificial tears (preservative free) Ophthalmic Solution 1 Drop(s) Both EYES every 6 hours PRN Dry Eyes  melatonin 3 milliGRAM(s) Oral at bedtime PRN Insomnia  ondansetron Injectable 4 milliGRAM(s) IV Push every 8 hours PRN Nausea and/or Vomiting      LABS:                        9.2    7.19  )-----------( 183      ( 22 Mar 2025 07:01 )             30.0     03-22    141  |  103  |  24[H]  ----------------------------<  121[H]  4.3   |  29  |  1.48[H]    Ca    9.4      22 Mar 2025 07:01  Phos  3.7     03-22  Mg     1.8     03-22        Urinalysis Basic - ( 22 Mar 2025 07:01 )    Color: x / Appearance: x / SG: x / pH: x  Gluc: 121 mg/dL / Ketone: x  / Bili: x / Urobili: x   Blood: x / Protein: x / Nitrite: x   Leuk Esterase: x / RBC: x / WBC x   Sq Epi: x / Non Sq Epi: x / Bacteria: x          CAPILLARY BLOOD GLUCOSE    MICROBIOLOGY:     RADIOLOGY:  [ ] Reviewed and interpreted by me     Patient is a 69y old  Male who presents with a chief complaint of Acute on chronic hypoxemic respiratory failure (23 Mar 2025 06:23)    HPI:  69 year old male with a past medical history of hypertension, hyperlipemia VAZQUEZ (on CPAP at bedtime, NC 2 liters during the day), CKD stage 3, epilepsy, schizophrenia, developmental delay, metastatic testicular cancer (s/p orchiectomy, actively on chemotherapy, last dose of etoposide/cisplatin on 6/2024) presenting with worsening shortness of breath.   Patient was recently hospitalized at [facility] from January 27th 2025 to February 6th 2025 for seizure and influenza virus infection, which required intubation. He was sent to rehab (originally from home) from hospital.   He returns due to sudden onset shortness of breath and worsening oxygenation. Of note, he tested positive for COVID-19 at facility on 2/13/25 and was not put on any COVID-19 treatment at the time, only guaifenesin and scopolamine patch. Patient was placed on non-rebreather and sent to the hospital on 2/16/25.   In the ER, vital signs significant for T-max 101.9F, HR 84-90. WBC 6.8. Hemoglobin 9.1. Platelet 87. AST 70. ALT 48. Cr 3.27. BUN 45. pH 7.30, pCO2 48. UA negative for infection. Patient given vancomycin, Zosyn, albuterol, and  cc bolus. Patient admitted to the general medicine service for further care.   Patient evaluated at bedside during admission. Unable to talk with the patient given that he is on BIPAP. Attempted to take the patient off BIPAP while in the room, but patient's work of breathing immediately began to increase and patient endorsed shortness of breath, so he was placed back on. History taken by chart review.  (16 Feb 2025 13:16)    Interval Events: No events overnight    REVIEW OF SYSTEMS:  [x ] Positive; complaining of headache  [ ] All other systems negative  [ ] Unable to assess ROS because ________    Vital Signs Last 24 Hrs  T(C): 36.2 (03-23-25 @ 04:55), Max: 37.1 (03-22-25 @ 17:20)  T(F): 97.1 (03-23-25 @ 04:55), Max: 98.8 (03-22-25 @ 17:20)  HR: 81 (03-23-25 @ 05:32) (72 - 92)  BP: 156/89 (03-23-25 @ 04:55) (127/77 - 156/89)  RR: 18 (03-23-25 @ 04:55) (18 - 20)  SpO2: 100% (03-23-25 @ 05:32) (95% - 100%)    PHYSICAL EXAM:  HEENT:   [X]Tracheostomy: #7 cuffless Portex   []Pupils equal  [ ]No oral lesions  [ ]Abnormal    SKIN  [ ]No Rash  [X] Abnormal: macerated skin noted on perineum, buttocks, and inner thighs; Lt occipital scalp injury.  [ ] pressure    CARDIAC  [X]Regular  [ ]Abnormal    PULMONARY  [X]Bilateral Clear Breath Sounds  [ ]Normal Excursion  [ ]Abnormal    GI  [X]PEG site c/d/i      [X] +BS		              [X]Soft, nondistended, nontender	  [ ]Abnormal    MUSCULOSKELETAL                                   [X]Bedbound                 [ ]Abnormal    [ ]Ambulatory/OOB to chair                           EXTREMITIES                                         [X]Normal  [ ]Edema                           NEUROLOGIC  [X] Normal, non focal  [ ] Focal findings:    PSYCHIATRIC  [X]Alert and appropriate Mood   [ ] Sedated	 [ ]Agitated    :  [place]: [ ] Yes, if yes: Date of Placement:                   [X] No    LINES: Central Lines [ ] Yes, if yes: Date of Placement                                     [X] No    HOSPITAL MEDICATIONS:  MEDICATIONS  (STANDING):  acetaminophen   IVPB .. 1000 milliGRAM(s) IV Intermittent once  albuterol/ipratropium for Nebulization 3 milliLiter(s) Nebulizer every 6 hours  apixaban 5 milliGRAM(s) Enteral Tube every 12 hours  atorvastatin 20 milliGRAM(s) Oral at bedtime  chlorhexidine 2% Cloths 1 Application(s) Topical <User Schedule>  cholecalciferol 1000 Unit(s) Oral daily  epoetin krystyna-epbx (RETACRIT) Injectable 10808 Unit(s) SubCutaneous <User Schedule>  escitalopram 15 milliGRAM(s) Oral with breakfast  gabapentin Solution 150 milliGRAM(s) Oral every 12 hours  influenza  Vaccine (HIGH DOSE) 0.5 milliLiter(s) IntraMuscular once  lacosamide Solution 200 milliGRAM(s) Oral two times a day  lamoTRIgine 50 milliGRAM(s) Oral two times a day  lanolin Ointment 1 Application(s) Topical daily  levETIRAcetam  Solution 750 milliGRAM(s) Oral two times a day  lurasidone 20 milliGRAM(s) Oral at bedtime  metoprolol tartrate 25 milliGRAM(s) Oral every 12 hours  nystatin Powder 1 Application(s) Topical every 8 hours  pantoprazole   Suspension 40 milliGRAM(s) Oral daily  polyethylene glycol 3350 17 Gram(s) Oral daily    MEDICATIONS  (PRN):  acetaminophen     Tablet .. 650 milliGRAM(s) Oral every 6 hours PRN Temp greater or equal to 38C (100.4F), Mild Pain (1 - 3)  aluminum hydroxide/magnesium hydroxide/simethicone Suspension 30 milliLiter(s) Oral every 4 hours PRN Dyspepsia  artificial tears (preservative free) Ophthalmic Solution 1 Drop(s) Both EYES every 6 hours PRN Dry Eyes  melatonin 3 milliGRAM(s) Oral at bedtime PRN Insomnia  ondansetron Injectable 4 milliGRAM(s) IV Push every 8 hours PRN Nausea and/or Vomiting      LABS:                        9.2    7.19  )-----------( 183      ( 22 Mar 2025 07:01 )             30.0     03-22    141  |  103  |  24[H]  ----------------------------<  121[H]  4.3   |  29  |  1.48[H]    Ca    9.4      22 Mar 2025 07:01  Phos  3.7     03-22  Mg     1.8     03-22        Urinalysis Basic - ( 22 Mar 2025 07:01 )    Color: x / Appearance: x / SG: x / pH: x  Gluc: 121 mg/dL / Ketone: x  / Bili: x / Urobili: x   Blood: x / Protein: x / Nitrite: x   Leuk Esterase: x / RBC: x / WBC x   Sq Epi: x / Non Sq Epi: x / Bacteria: x          CAPILLARY BLOOD GLUCOSE    MICROBIOLOGY:     RADIOLOGY:  [ ] Reviewed and interpreted by me

## 2025-03-23 NOTE — PROGRESS NOTE ADULT - NS ATTEND AMEND GEN_ALL_CORE FT
as above:  70 yo M with a h/o metastatic testicular cancer, epilepsy on AEDs, chronic respiratory failure with hypoxia and hypercapnia on home O2, VAZQUEZ on CPAP, HFpEF admitted for acute on chronic respiratory failure with hypoxia and hypercapnia likely secondary to combination of COVID PNA, bacterial superinfection, and acute pulmonary edema due to acute on chronic HFpEF. Hypotension likely severe sepsis with septic shock. SHAGUFTA likely ATN +/- venous congestion. Repeat CT chest with bilateral GGOs possibly acute pulmonary edema vs early fibrosis vs residual COVID along with L basilar consolidation likely bacterial PNA vs atelectasis. Now s/p trach and PEG.    # Acute on chronic respiratory failure with hypoxia and hypercapnia  # COVID PNA  # Bacterial PNA  # Acute pulmonary edema  # Acute on chronic HFpEF  # Hypotension  # Severe sepsis with septic shock  # SHAGUFTA  # Hypernatremia  # Epilepsy  Clinically much improved, interactive and phonating  - Continue AEDs  - Tolerating TC and PMV   - Trend Cr, avoid nephrotoxins  - Completed Levaquin course   - Completed course of dexamethasone and remdesivir for COVID  - c/w PEG feeds, e/o aspiration on FEES testing on 3/18--MBS coming week planned  - Hypernatremia resolved  - Afib- rate controlled, tolerating Eliquis. TTE without evidence of LA thrombus, unable to assess Left atrial appendage  -OOB to chair, PT and OT follow up appreciated   - Full code    Brandon Fowler MD-Pulmonary   [phone] as above: no new issues  70 yo M with a h/o metastatic testicular cancer, epilepsy on AEDs, chronic respiratory failure with hypoxia and hypercapnia on home O2, VAZQUEZ on CPAP, HFpEF admitted for acute on chronic respiratory failure with hypoxia and hypercapnia likely secondary to combination of COVID PNA, bacterial superinfection, and acute pulmonary edema due to acute on chronic HFpEF. Hypotension likely severe sepsis with septic shock. SHAGUFTA likely ATN +/- venous congestion. Repeat CT chest with bilateral GGOs possibly acute pulmonary edema vs early fibrosis vs residual COVID along with L basilar consolidation likely bacterial PNA vs atelectasis. Now s/p trach and PEG.    # Acute on chronic respiratory failure with hypoxia and hypercapnia  # COVID PNA  # Bacterial PNA  # Acute pulmonary edema  # Acute on chronic HFpEF  # Hypotension  # Severe sepsis with septic shock  # SHAGUFTA  # Hypernatremia  # Epilepsy  Clinically much improved, interactive and phonating  - Continue AEDs  - Tolerating TC and PMV   FEN- Trend Cr, avoid nephrotoxins, - Hypernatremia resolved  ID- Completed Levaquin course     - Completed course of dexamethasone and remdesivir for COVID  GI- c/w PEG feeds, e/o aspiration on FEES testing on 3/18--MBS coming week planned  CV- Afib- rate controlled, tolerating Eliquis. TTE without evidence of LA thrombus, unable to assess Left atrial appendage  -OOB to chair, PT and OT follow up appreciated   - Full code    Brandon Fowler MD-Pulmonary   [phone]

## 2025-03-24 LAB
ANION GAP SERPL CALC-SCNC: 12 MMOL/L — SIGNIFICANT CHANGE UP (ref 5–17)
BUN SERPL-MCNC: 23 MG/DL — SIGNIFICANT CHANGE UP (ref 7–23)
CALCIUM SERPL-MCNC: 9.3 MG/DL — SIGNIFICANT CHANGE UP (ref 8.4–10.5)
CHLORIDE SERPL-SCNC: 100 MMOL/L — SIGNIFICANT CHANGE UP (ref 96–108)
CO2 SERPL-SCNC: 27 MMOL/L — SIGNIFICANT CHANGE UP (ref 22–31)
CREAT SERPL-MCNC: 1.44 MG/DL — HIGH (ref 0.5–1.3)
EGFR: 53 ML/MIN/1.73M2 — LOW
EGFR: 53 ML/MIN/1.73M2 — LOW
GLUCOSE BLDC GLUCOMTR-MCNC: 137 MG/DL — HIGH (ref 70–99)
GLUCOSE SERPL-MCNC: 106 MG/DL — HIGH (ref 70–99)
HCT VFR BLD CALC: 30.2 % — LOW (ref 39–50)
HGB BLD-MCNC: 9.4 G/DL — LOW (ref 13–17)
MAGNESIUM SERPL-MCNC: 1.9 MG/DL — SIGNIFICANT CHANGE UP (ref 1.6–2.6)
MCHC RBC-ENTMCNC: 31.1 G/DL — LOW (ref 32–36)
MCHC RBC-ENTMCNC: 31.5 PG — SIGNIFICANT CHANGE UP (ref 27–34)
MCV RBC AUTO: 101.3 FL — HIGH (ref 80–100)
NRBC BLD AUTO-RTO: 0 /100 WBCS — SIGNIFICANT CHANGE UP (ref 0–0)
PHOSPHATE SERPL-MCNC: 3.4 MG/DL — SIGNIFICANT CHANGE UP (ref 2.5–4.5)
PLATELET # BLD AUTO: 203 K/UL — SIGNIFICANT CHANGE UP (ref 150–400)
POTASSIUM SERPL-MCNC: 4.9 MMOL/L — SIGNIFICANT CHANGE UP (ref 3.5–5.3)
POTASSIUM SERPL-SCNC: 4.9 MMOL/L — SIGNIFICANT CHANGE UP (ref 3.5–5.3)
RBC # BLD: 2.98 M/UL — LOW (ref 4.2–5.8)
RBC # FLD: 17.9 % — HIGH (ref 10.3–14.5)
SODIUM SERPL-SCNC: 139 MMOL/L — SIGNIFICANT CHANGE UP (ref 135–145)
WBC # BLD: 7.55 K/UL — SIGNIFICANT CHANGE UP (ref 3.8–10.5)
WBC # FLD AUTO: 7.55 K/UL — SIGNIFICANT CHANGE UP (ref 3.8–10.5)

## 2025-03-24 PROCEDURE — 99233 SBSQ HOSP IP/OBS HIGH 50: CPT | Mod: FS

## 2025-03-24 RX ADMIN — GABAPENTIN 150 MILLIGRAM(S): 400 CAPSULE ORAL at 05:06

## 2025-03-24 RX ADMIN — APIXABAN 5 MILLIGRAM(S): 2.5 TABLET, FILM COATED ORAL at 17:56

## 2025-03-24 RX ADMIN — METOPROLOL SUCCINATE 25 MILLIGRAM(S): 50 TABLET, EXTENDED RELEASE ORAL at 05:08

## 2025-03-24 RX ADMIN — LEVETIRACETAM 750 MILLIGRAM(S): 10 INJECTION, SOLUTION INTRAVENOUS at 17:55

## 2025-03-24 RX ADMIN — NYSTATIN 1 APPLICATION(S): 100000 CREAM TOPICAL at 05:10

## 2025-03-24 RX ADMIN — Medication 1 APPLICATION(S): at 12:10

## 2025-03-24 RX ADMIN — NYSTATIN 1 APPLICATION(S): 100000 CREAM TOPICAL at 22:16

## 2025-03-24 RX ADMIN — ATORVASTATIN CALCIUM 20 MILLIGRAM(S): 80 TABLET, FILM COATED ORAL at 22:16

## 2025-03-24 RX ADMIN — ESCITALOPRAM OXALATE 15 MILLIGRAM(S): 20 TABLET ORAL at 05:07

## 2025-03-24 RX ADMIN — IPRATROPIUM BROMIDE AND ALBUTEROL SULFATE 3 MILLILITER(S): .5; 2.5 SOLUTION RESPIRATORY (INHALATION) at 11:27

## 2025-03-24 RX ADMIN — LACOSAMIDE 200 MILLIGRAM(S): 150 TABLET, FILM COATED ORAL at 17:55

## 2025-03-24 RX ADMIN — LAMOTRIGINE 50 MILLIGRAM(S): 150 TABLET ORAL at 17:55

## 2025-03-24 RX ADMIN — IPRATROPIUM BROMIDE AND ALBUTEROL SULFATE 3 MILLILITER(S): .5; 2.5 SOLUTION RESPIRATORY (INHALATION) at 05:58

## 2025-03-24 RX ADMIN — LURASIDONE HYDROCHLORIDE 20 MILLIGRAM(S): 120 TABLET, FILM COATED ORAL at 22:16

## 2025-03-24 RX ADMIN — APIXABAN 5 MILLIGRAM(S): 2.5 TABLET, FILM COATED ORAL at 05:08

## 2025-03-24 RX ADMIN — METOPROLOL SUCCINATE 25 MILLIGRAM(S): 50 TABLET, EXTENDED RELEASE ORAL at 17:55

## 2025-03-24 RX ADMIN — LACOSAMIDE 200 MILLIGRAM(S): 150 TABLET, FILM COATED ORAL at 05:07

## 2025-03-24 RX ADMIN — LAMOTRIGINE 50 MILLIGRAM(S): 150 TABLET ORAL at 05:07

## 2025-03-24 RX ADMIN — Medication 1000 UNIT(S): at 12:10

## 2025-03-24 RX ADMIN — POLYETHYLENE GLYCOL 3350 17 GRAM(S): 17 POWDER, FOR SOLUTION ORAL at 12:10

## 2025-03-24 RX ADMIN — LEVETIRACETAM 750 MILLIGRAM(S): 10 INJECTION, SOLUTION INTRAVENOUS at 05:06

## 2025-03-24 RX ADMIN — NYSTATIN 1 APPLICATION(S): 100000 CREAM TOPICAL at 14:00

## 2025-03-24 RX ADMIN — Medication 1 APPLICATION(S): at 12:00

## 2025-03-24 RX ADMIN — IPRATROPIUM BROMIDE AND ALBUTEROL SULFATE 3 MILLILITER(S): .5; 2.5 SOLUTION RESPIRATORY (INHALATION) at 17:01

## 2025-03-24 RX ADMIN — GABAPENTIN 150 MILLIGRAM(S): 400 CAPSULE ORAL at 17:54

## 2025-03-24 RX ADMIN — Medication 40 MILLIGRAM(S): at 12:10

## 2025-03-24 NOTE — PROGRESS NOTE ADULT - ASSESSMENT
69 year old male with a past medical history of hypertension, hyperlipemia VAZQUEZ (on CPAP at bedtime, NC 2 liters during the day), CKD stage 3, epilepsy, schizophrenia, developmental delay, metastatic testicular cancer (s/p orchiectomy, actively on chemotherapy, last dose of etoposide/cisplatin on 6/2024) presenting with worsening shortness of breath. Patient was recently hospitalized at [facility] from January 27th 2025 to February 6th 2025 for seizure and influenza virus infection, which required intubation. He was sent to rehab (originally from home) from hospital. He returns due to sudden onset shortness of breath and worsening oxygenation. Of note, he tested positive for COVID-19       1-  CKDIII  2- covid-19  3- anemia  4- hypotension  5- respiratory failure         SHAGUFTA suspected in setting of new infection. covid 19   creatinine is improving to ckd III   bp is in range   anemia, trend hb   retacrit 36273 U tiw sq  tube feeds -> Jevity  strict I/O  trend creatinine and electroytes daily

## 2025-03-24 NOTE — PROGRESS NOTE ADULT - SUBJECTIVE AND OBJECTIVE BOX
[facility] HEMATOLOGY/ONCOLOGY INPATIENT PROGRESS NOTE     Interval Hx:   03-24-25: Mr. Gr was seen at bedside today.    Meds:   MEDICATIONS  (STANDING):  acetaminophen   IVPB .. 1000 milliGRAM(s) IV Intermittent once  albuterol/ipratropium for Nebulization 3 milliLiter(s) Nebulizer every 6 hours  apixaban 5 milliGRAM(s) Enteral Tube every 12 hours  atorvastatin 20 milliGRAM(s) Oral at bedtime  chlorhexidine 4% Liquid 1 Application(s) Topical daily  cholecalciferol 1000 Unit(s) Oral daily  epoetin krystyna-epbx (RETACRIT) Injectable 76489 Unit(s) SubCutaneous <User Schedule>  escitalopram 15 milliGRAM(s) Oral with breakfast  gabapentin Solution 150 milliGRAM(s) Oral every 12 hours  influenza  Vaccine (HIGH DOSE) 0.5 milliLiter(s) IntraMuscular once  lacosamide Solution 200 milliGRAM(s) Oral two times a day  lamoTRIgine 50 milliGRAM(s) Oral two times a day  lanolin Ointment 1 Application(s) Topical daily  levETIRAcetam  Solution 750 milliGRAM(s) Oral two times a day  lurasidone 20 milliGRAM(s) Oral at bedtime  metoprolol tartrate 25 milliGRAM(s) Oral every 12 hours  nystatin Powder 1 Application(s) Topical every 8 hours  pantoprazole   Suspension 40 milliGRAM(s) Oral daily  polyethylene glycol 3350 17 Gram(s) Oral daily    MEDICATIONS  (PRN):  acetaminophen     Tablet .. 650 milliGRAM(s) Oral every 6 hours PRN Temp greater or equal to 38C (100.4F), Mild Pain (1 - 3)  aluminum hydroxide/magnesium hydroxide/simethicone Suspension 30 milliLiter(s) Oral every 4 hours PRN Dyspepsia  artificial tears (preservative free) Ophthalmic Solution 1 Drop(s) Both EYES every 6 hours PRN Dry Eyes  melatonin 3 milliGRAM(s) Oral at bedtime PRN Insomnia  ondansetron Injectable 4 milliGRAM(s) IV Push every 8 hours PRN Nausea and/or Vomiting    Vital Signs Last 24 Hrs  T(C): 37 (24 Mar 2025 00:24), Max: 37 (24 Mar 2025 00:24)  T(F): 98.6 (24 Mar 2025 00:24), Max: 98.6 (24 Mar 2025 00:24)  HR: 76 (24 Mar 2025 00:24) (74 - 90)  BP: 170/98 (24 Mar 2025 00:24) (148/92 - 170/98)  BP(mean): --  RR: 16 (24 Mar 2025 00:24) (16 - 18)  SpO2: 98% (24 Mar 2025 00:24) (95% - 100%)    Parameters below as of 24 Mar 2025 00:24  Patient On (Oxygen Delivery Method): tracheostomy collar    Physical Exam:  Gen: NAD, tracheostomy with ETT  HEENT: EOMI, MMM  Chest: equal chest rise  Cardiac: regular   Abd: non distended    Labs:                        8.9    6.67  )-----------( 174      ( 23 Mar 2025 07:26 )             28.4     CBC Full  -  ( 23 Mar 2025 07:26 )  WBC Count : 6.67 K/uL  RBC Count : 2.82 M/uL  Hemoglobin : 8.9 g/dL  Hematocrit : 28.4 %  Platelet Count - Automated : 174 K/uL  Mean Cell Volume : 100.7 fl  Mean Cell Hemoglobin : 31.6 pg  Mean Cell Hemoglobin Concentration : 31.3 g/dL    03-23    142  |  103  |  24[H]  ----------------------------<  125[H]  4.4   |  27  |  1.43[H]    Ca    9.2      23 Mar 2025 07:25  Phos  3.7     03-23  Mg     2.0     03-23         [facility] HEMATOLOGY/ONCOLOGY INPATIENT PROGRESS NOTE     Interval Hx:   03-24-25: Mr. Gr was seen at bedside today, comfortable, without significant overnight events noted, continues with Trach, without increased oxygen requirements, discussed with overnight team, had 1 BM overnight. No signs of bleeding noted, Counts and labs reviewed overall improving/stable. On exam without signs of bleeding, continues on Eliquis 5mg BID     Meds:   MEDICATIONS  (STANDING):  acetaminophen   IVPB .. 1000 milliGRAM(s) IV Intermittent once  albuterol/ipratropium for Nebulization 3 milliLiter(s) Nebulizer every 6 hours  apixaban 5 milliGRAM(s) Enteral Tube every 12 hours  atorvastatin 20 milliGRAM(s) Oral at bedtime  chlorhexidine 4% Liquid 1 Application(s) Topical daily  cholecalciferol 1000 Unit(s) Oral daily  epoetin krystyna-epbx (RETACRIT) Injectable 33200 Unit(s) SubCutaneous <User Schedule>  escitalopram 15 milliGRAM(s) Oral with breakfast  gabapentin Solution 150 milliGRAM(s) Oral every 12 hours  influenza  Vaccine (HIGH DOSE) 0.5 milliLiter(s) IntraMuscular once  lacosamide Solution 200 milliGRAM(s) Oral two times a day  lamoTRIgine 50 milliGRAM(s) Oral two times a day  lanolin Ointment 1 Application(s) Topical daily  levETIRAcetam  Solution 750 milliGRAM(s) Oral two times a day  lurasidone 20 milliGRAM(s) Oral at bedtime  metoprolol tartrate 25 milliGRAM(s) Oral every 12 hours  nystatin Powder 1 Application(s) Topical every 8 hours  pantoprazole   Suspension 40 milliGRAM(s) Oral daily  polyethylene glycol 3350 17 Gram(s) Oral daily    MEDICATIONS  (PRN):  acetaminophen     Tablet .. 650 milliGRAM(s) Oral every 6 hours PRN Temp greater or equal to 38C (100.4F), Mild Pain (1 - 3)  aluminum hydroxide/magnesium hydroxide/simethicone Suspension 30 milliLiter(s) Oral every 4 hours PRN Dyspepsia  artificial tears (preservative free) Ophthalmic Solution 1 Drop(s) Both EYES every 6 hours PRN Dry Eyes  melatonin 3 milliGRAM(s) Oral at bedtime PRN Insomnia  ondansetron Injectable 4 milliGRAM(s) IV Push every 8 hours PRN Nausea and/or Vomiting    Vital Signs Last 24 Hrs  T(C): 37 (24 Mar 2025 00:24), Max: 37 (24 Mar 2025 00:24)  T(F): 98.6 (24 Mar 2025 00:24), Max: 98.6 (24 Mar 2025 00:24)  HR: 76 (24 Mar 2025 00:24) (74 - 90)  BP: 170/98 (24 Mar 2025 00:24) (148/92 - 170/98)  BP(mean): --  RR: 16 (24 Mar 2025 00:24) (16 - 18)  SpO2: 98% (24 Mar 2025 00:24) (95% - 100%)    Parameters below as of 24 Mar 2025 00:24  Patient On (Oxygen Delivery Method): tracheostomy collar    Physical Exam:  Gen: NAD, tracheostomy with ETT  HEENT: EOMI, MMM  Chest: equal chest rise  Cardiac: regular   Abd: non distended    Labs:                        8.9    6.67  )-----------( 174      ( 23 Mar 2025 07:26 )             28.4     CBC Full  -  ( 23 Mar 2025 07:26 )  WBC Count : 6.67 K/uL  RBC Count : 2.82 M/uL  Hemoglobin : 8.9 g/dL  Hematocrit : 28.4 %  Platelet Count - Automated : 174 K/uL  Mean Cell Volume : 100.7 fl  Mean Cell Hemoglobin : 31.6 pg  Mean Cell Hemoglobin Concentration : 31.3 g/dL    03-23    142  |  103  |  24[H]  ----------------------------<  125[H]  4.4   |  27  |  1.43[H]    Ca    9.2      23 Mar 2025 07:25  Phos  3.7     03-23  Mg     2.0     03-23

## 2025-03-24 NOTE — PROGRESS NOTE ADULT - SUBJECTIVE AND OBJECTIVE BOX
DATE OF SERVICE: 03-24-25 @ 16:20    Patient is a 69y old  Male who presents with a chief complaint of Acute on chronic hypoxemic respiratory failure (24 Mar 2025 09:52)      INTERVAL HISTORY: in no apparent distress    REVIEW OF SYSTEMS:  CONSTITUTIONAL: No weakness  EYES/ENT: No visual changes;  No throat pain   NECK: No pain or stiffness  RESPIRATORY: No cough, wheezing; No shortness of breath  CARDIOVASCULAR: No chest pain or palpitations  GASTROINTESTINAL: No abdominal  pain. No nausea, vomiting, or hematemesis  GENITOURINARY: No dysuria, frequency or hematuria  NEUROLOGICAL: No stroke like symptoms  SKIN: No rashes    TELEMETRY Personally reviewed:  	  MEDICATIONS:  metoprolol tartrate 25 milliGRAM(s) Oral every 12 hours        PHYSICAL EXAM:  T(C): 37.2 (03-24-25 @ 11:15), Max: 37.2 (03-24-25 @ 11:15)  HR: 88 (03-24-25 @ 15:25) (74 - 90)  BP: 156/91 (03-24-25 @ 13:30) (136/86 - 170/98)  RR: 18 (03-24-25 @ 15:25) (16 - 18)  SpO2: 96% (03-24-25 @ 15:25) (95% - 100%)  Wt(kg): --  I&O's Summary    23 Mar 2025 07:01  -  24 Mar 2025 07:00  --------------------------------------------------------  IN: 1310 mL / OUT: 1400 mL / NET: -90 mL          Appearance: In no distress	  HEENT:    PERRL, EOMI	  Cardiovascular:  S1 S2, No JVD  Respiratory: Lungs clear to auscultation	  Gastrointestinal:  Soft, Non-tender, + BS	  Vascularature:  No edema of LE  Psychiatric: Appropriate affect   Neuro: no acute focal deficits                               9.4    7.55  )-----------( 203      ( 24 Mar 2025 06:08 )             30.2     03-24    139  |  100  |  23  ----------------------------<  106[H]  4.9   |  27  |  1.44[H]    Ca    9.3      24 Mar 2025 06:08  Phos  3.4     03-24  Mg     1.9     03-24          Labs personally reviewed      ASSESSMENT/PLAN: 	      69 year old male with a past medical history of hypertension, hyperlipemia VAZQUEZ (on CPAP at bedtime, NC 2 liters during the day), CKD stage 3, epilepsy, schizophrenia, developmental delay, metastatic testicular cancer (s/p orchiectomy, actively on chemotherapy, last dose of etoposide/cisplatin on 6/2024), presenting with acute on chronic hypoxemic respiratory failure, secondary to (a) COVID-19 infection, (b) superimposed pneumonia after recent influenza infection, and/or (c) fluid overload.     Problem/Plan - 1:  ·  Problem: Acute on chronic respiratory failure with hypoxia.   ·  Plan: Likely 2/2 PNA, HF  - Appreciate pulmonology.  - s/p trach 3/12  - Transferred to [facility]    Problem/Plan - 2:  ·  Problem: SHAGUFTA (acute kidney injury).   ·  Plan: Has a history of CKD stage 3, Cr 2.38 at baseline.    - Nephrology following  - BUN now improved following de-escalation of diuretics    Problem/Plan - 3:  ·  Problem: Chronic heart failure with preserved ejection fraction.   ·  Plan: TTE done here 1/17/25 technically difficult, but LV systolic fxn appears nl without any regional wall motion abnormalities  - Euvolemic, repeat BNP. On 2/23 2300  - TTE 3/17 1. No obvious Left atrial thrombus; unable to evaluate left atrial appendage on TTE 2. Compared to the transthoracic echocardiogram performed on 2/23/2025, LA difficult to compare.    Problem/Plan - 4:  ·  Problem: New onset Afib RVR (on tele, confirmed with EKG 2/19)   ·  Plan:  - Cont Metoprolol 25mg BID  - Cont Eliquis 5mg BID      Problem/Plan - 5:  ·  Problem: HTN    ·  Plan: Resolved  - 3/19 slightly above target. Will cont to monitor  - 3/24 improved, last 136/86            Iolani Behrbom, AG-DARIAN Andrew,  [facility]  Cardiovascular Medicine  [address], [address]  Available through call or text on [employer]  Office: [phone]

## 2025-03-24 NOTE — PROGRESS NOTE ADULT - NS ATTEND AMEND GEN_ALL_CORE FT
70 yo M with a h/o metastatic testicular cancer, epilepsy on AEDs, chronic respiratory failure with hypoxia and hypercapnia on home O2, VAZQUEZ on CPAP, HFpEF admitted for acute on chronic respiratory failure with hypoxia and hypercapnia likely secondary to combination of COVID PNA, bacterial superinfection, and acute pulmonary edema due to acute on chronic HFpEF. Hypotension likely severe sepsis with septic shock. SHAGUFTA likely ATN +/- venous congestion. Repeat CT chest with bilateral GGOs possibly acute pulmonary edema vs early fibrosis vs residual COVID along with L basilar consolidation likely bacterial PNA vs atelectasis. Now s/p trach and PEG.    # Acute on chronic respiratory failure with hypoxia and hypercapnia  # COVID PNA  # Bacterial PNA  # Acute pulmonary edema  # Acute on chronic HFpEF  # Hypotension  # Severe sepsis with septic shock  # SHAGUFTA  # Hypernatremia  # Epilepsy  Clinically much improved, interactive and phonating  - Continue AEDs  - Tolerating TC and PMV   FEN- Trend Cr, avoid nephrotoxins, - Hypernatremia resolved  ID- Completed Levaquin course     - Completed course of dexamethasone and remdesivir for COVID  GI- c/w PEG feeds, e/o aspiration on FEES testing on 3/18--plan for MBS this week. Tolerating tube feeds however with noted loose BMS- will dc miralax. Follow up nutrition recs  CV- Afib- rate controlled, tolerating Eliquis. TTE without evidence of LA thrombus, unable to assess Left atrial appendage  -OOB to chair, PT and OT follow up appreciated. Potential dc to rehab this wek.   - Full code

## 2025-03-24 NOTE — PROGRESS NOTE ADULT - ASSESSMENT
Mr. Gr is a 69 year old male with PMHx of seizure disorder, sleep apnea, HTN, chronic idiopathic constipation, stage III CKD, severe obesity, secondary hyperparathyroidism, anemia, thrombocytopenia, hyperlipidemia, testicular cancer under treatment with Dr. Ovalles who is admitted for acute hypoxic respiratory failure secondary to COVID vs superimposed pneumonia with new onset thrombocytopenia. He was recently discharged 02/06/2025 after a tenous stay including intubation in the ICU for respiratory failure in setting of seizure. He had recovered and was discharged to rehab.      Former smoker, quit 14y prior, denied ETOH, drug use. Lives at home. Does not have children. Denies family history of blood disorders or malignancy.  Denies previous workplace related exposures.  Denies previous history of blood transfusions.  Denied history of thromboses.  Denied history of  requiring anticoagulation.     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  Margins were negative.  pT3Nx. CT C/A/P with few left para-aortic and left iliac chain lymph nodes largest measuring 8.1 cm left para-aortic node, bilateral subcentimeter noncalcified pulmonary nodules measuring up to 7 mm. AFP, LDH, hCG were all elevated. 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 Started on adjuvant therapy with Cisplatin+Etoposide, so far completed 4 cycles of treatment with cisplatin dose reduced 50% and Etoposide 50% due to thrombocytopenia.      02/19/2025: on HFNC, noted tachycardia and fever, Klebsiella in urine as well, thrombocytopenia stable/improving, no signs of active bleeding     02/20/2025: developed A.fib with RVR and was placed on heparin drip and pending cardiology eval    02/21/2025: awake, on AVAPS overnight, noted RRT for hypoxia as pt removed HFNC at some point in time, good response thereafter     02/22/2025:  continues on AVAPS this morning, noted RRT overnight for hypoxia, hypercapnia, ICU following, US LE negative for DVT, counts overall stable/improved     02/23/2025: progressive respiratory failure, noted ongoing RTT this morning with pending intubation and transfer to MICU     02/24/2025: intubated 02/23/2025, sedated, on pressor support, no signs of bleeding, counts noted, no signs of bleeding     02/25/2025: continues in MICU, intubated and sedated, no signs of bleeding    02/26/2025: continues in MICU, intubated, without signs of bleeding, continues on heparin drip     02/27/2025:  remains under the care of the ICU, intubated, no signs of bleeding and continues on heparin drip, counts stable, has not required PRBC support this admission    02/28/2025: continues under the care of MICU, continues intubated, no signs of bleeding, on heparin drip    03/01/2025: continues in MICU, intubated, direct josefina IgG positive, eluate negative, not likely to be clinically significant     03/02/2025:  continues in MICU, nursing staff at bedside, no overnight events noted. CTH without acute intracranial pathology     03/03/2025: continues in MICU, intubated, on heparin drip, 1u PRBC overnight, no signs of bleeding, platelet count stable     03/04/2025: awake, moving arms spontaneously on exam, continues without much interaction however. No signs of bleeding, continues heparin drip, continues intubated in MICU     03/05/2025: extubated 03/04/2025, continues in MICU, awake and alert this morning and able to speak full sentences, discussed/reviewed findings, continues on heparin drip     03/06/2025: nursing staff at bedside, bipap overnight, without signs of bleeding, counts noted stable, renal function improving     03/07/2025:  no signs of bleeding, counts noted, continues heparin drip, continues in MICU    03/08/2025: on HFNC, no signs of bleeding, although alert and answering questions, not back to his baseline yet, continues in MICU    03/09/2025: continues in MICU, s/p re-intubation 03/08/2025 given respiratory compromise in the setting of copious secretions as evidenced by bronchoscopy, in setting of fever, now sedated, mild crusting of blood around mouth, without active sign of bleeding, counts noted reviewed, continues on heparin drip     03/10/2025: continues in MICU, no signs of bleeding, discussed with nursing staff at bedside, receiving 1u PRBC due to H/H downtrend, sputum culture with Pseudomonas, ICU and ID recs noted, continues to be intubated     03/11/2025: continues in MICU, intubated and sedated, noted counts, without signs of bleeding, appropriate response to transfusion    03/12/2025: continues in MICU, intubated and sedated, without signs of bleeding     03/13/2025: continues in MICU, s/p Tracheostomy 03/12/2025 with tracheal intubation, continues with sedation     03/14/2025:  awake and alert, mental status much improved, transferred from MICU to [address] [place] [address] 03/13/2025. Without significant events overnight, discussed with nursing staff at bedside, stated pt did well overnight, no signs of bleeding, no fever noted, medications provided via NGT and he is pending PEG-Tube placement today. His case is complex, noted with repeat need for intubations, discussed with pt, he is aware. Discussed with pts primary Oncologist as well.    03/15/2025: awake and alert, no overnight events noted, discussed with nursing staff, he did well overnight, no fevers, no signs of bleeding endorsed. He nods to information. Counts reviewed overall stable, discussed with him as well. Had PEG placed 03/14/2025 03/16/2025: no overnight events noted, comfortable overnight, nursing staff at bedside, stated pt did well, no signs of bleeding, had a BM. No fever reported. Counts reviewed, stable at this time     03/17/2025: no overnight events noted however continues with need for suctioning due to increase secretions, discussed with overnight staff, mild bleeding likely due to trauma from suctioning and recent tracheostomy placement without evidence of active bleeding otherwise. No fevers noted overnight. Had multiple smaller BMs, without evidence of bleeding, Nephrology recs and reviewed noted for Epo    03/18/2025: without significant overnight events, discussed with team at bedside, no evidence of bleeding, noted cardiology recs to start Eliquis 5mg BID for A.fib, currently on Lovenox, previously did require PRBC this admission but has since been stable, renal function seems to be improving, and Plt count improved, ok for AC per cardiology recs with Eliquis, monitor H/H closely     03/19/2025: comfortable, no overnight events noted, discussed with team, had 1 BM overnight without evidence of bleeding, now on Eliquis 5mg BID per cardiology recs for A.fib, renal function stable. Oxygen requirements stable overnight. No fevers reported. Pts primary Oncologist, Dr. Ovalles, aware, case discussed     03/20/2025: without significant events noted, continues on Eliquis, therapeutic, without signs of bleeding, continues on Epo, counts and imaging reviewed, no overnight events noted, continues with tracheostomy without increased oxygen requirements, increased secretions noted     03/21/2025: without significant overnight events noted. Discussed with overnight team, without evidence of bleeding, fever. Had a BM overnight, normal. Without increasing oxygen requirements, Without seizure like activity given hx of malignancy and cerebellar finding previously. Continues on Eliquis. Counts noted and reviewed, anemia, labile thrombocytopenia     03/22/2025:  no overnight events noted, discussed with overnight staff, pt did well, without increased oxygen requirements, without signs of bleeding. Had one BM overnight, without bleeding. Continues on Eliquis, Renal function is stable and appropriate. Counts and labs reviewed, overall stable however anemia persists.     03/23/2025: no significant overnight events noted, discussed with overnight team, without signs of bleeding. Oxygen requirements stable, pulmonary recs noted. Labs and course reviewed       #Acute hypoxic respiratory failure, Acute, Severe   # COVID  - CT noted with bilateral GGO  - ID following  - Pulmonary following  - Antibiotics per primary team/MICU and ID   - Intubated 02/23/2025 AM, MICU   - extubated 03/04/2025  - Pseudomonas from sputum culture   - Re-intubated 03/09/2025 due to increased work of breathing in setting of increased secretions, not protecting airway, tachypnea, hypoxia   - s/p Tracheostomy 03/12/2025    # Thrombocytopenia, Acute, Moderate   - Did occur during most recent admission and improved to normal prior to discharge   - Without signs of bleeding  - Could be multifactorial, possibly due to infection   - Continue to trend  - Transfuse to maintain Plt > 10k unless actively bleeding then maintain > 50k     # Brain lesion Hx of cancer, Acute, Severe   - pt with hx of seizures  - MRI brain now with 5.4 mm enhancing lesion in the LEFT middle cerebellar peduncle with associated edema suspicious for metastases  - MRI Lumbar negative for acute disease  - Small lesion without mass effect or edema, recommended to correlate per neurology if foci and locus are concordant with seizure activity  - Neurology recs noted, new lesion not likely to cause seizure  - It is rare, but nonetheless not impossible, for testicular cancer to be metastatic to the brain  - He will need another PET-CT, can be completed outpatient once stable if concern can proceed with biopsy at that time   - Previous endocrinology eval for thyroid nodule noted  - AFP/BHCH normal,  previously   - Repeat CTH without acute intracranial pathology     # 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 Ovalles, agree with current plan  - Follow up as outpatient with Franki Ovalles MD     # Acute kidney injury on CKD Stage IIIA, Acute, Moderate   - Likely multifactorial  - Nephrology consult and recs noted  - Now on therapeutic Eliquis, will need to be monitoring in high risk setting for bleeding   - Continue to trend     # Normocytic anemia, Acute, Severe   - Chronic, has hx of PRBC during chemo 07/2024  - Noted Anti E, Anti-K Anti-C antibodies previously  - direct josefina IgG positive, eluate negative, not likely to be clinically significant   - s/p 2u PRBC 03/03/2025 and 03/10/2025   - Monitor for bleeding  - Epo ordered  - Recommend to transfuse to maintain Hb > 7    # Klebsiella UTI, Acute, Severe > Moderate   # Pseudomonas UTI, Acute, Moderate   -Antibiotics per ID and primary team       Recommendations  - Trend CBC daily  - Transfuse to maintain Hb > 7   - Transfuse to maintain Plt >10k unless active bleeding then >50k   - Cardiology recs noted, now on Eliquis 5mg BID for A.fib, monitor H/H         Thank you for allowing me to participate in the care of Mr. Gr please do not hesitate to call or text me if you have further questions or concerns.     Brayan Mart MD  [facility]   Division of Hematology/Oncology  [address] [place] [address], [address]  [place] [address]  P: [phone]  F: [phone]    Attestation:    ----Pt evaluated, >50min spent including face-to-face interaction in addition to chart review, reviewing treatment plan, discussing with care staff in hospital, his outside physician, and managing, reviewing orders, labs and additional time spent documenting, in addition to the patient’s chronic diagnoses as listed in the assessment----        Mr. Gr is a 69 year old male with PMHx of seizure disorder, sleep apnea, HTN, chronic idiopathic constipation, stage III CKD, severe obesity, secondary hyperparathyroidism, anemia, thrombocytopenia, hyperlipidemia, testicular cancer under treatment with Dr. Ovalles who is admitted for acute hypoxic respiratory failure secondary to COVID vs superimposed pneumonia with new onset thrombocytopenia. He was recently discharged 02/06/2025 after a tenous stay including intubation in the ICU for respiratory failure in setting of seizure. He had recovered and was discharged to rehab.      Former smoker, quit 14y prior, denied ETOH, drug use. Lives at home. Does not have children. Denies family history of blood disorders or malignancy.  Denies previous workplace related exposures.  Denies previous history of blood transfusions.  Denied history of thromboses.  Denied history of  requiring anticoagulation.     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  Margins were negative.  pT3Nx. CT C/A/P with few left para-aortic and left iliac chain lymph nodes largest measuring 8.1 cm left para-aortic node, bilateral subcentimeter noncalcified pulmonary nodules measuring up to 7 mm. AFP, LDH, hCG were all elevated. 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 Started on adjuvant therapy with Cisplatin+Etoposide, so far completed 4 cycles of treatment with cisplatin dose reduced 50% and Etoposide 50% due to thrombocytopenia.      02/19/2025: on HFNC, noted tachycardia and fever, Klebsiella in urine as well, thrombocytopenia stable/improving, no signs of active bleeding     02/20/2025: developed A.fib with RVR and was placed on heparin drip and pending cardiology eval    02/21/2025: awake, on AVAPS overnight, noted RRT for hypoxia as pt removed HFNC at some point in time, good response thereafter     02/22/2025:  continues on AVAPS this morning, noted RRT overnight for hypoxia, hypercapnia, ICU following, US LE negative for DVT, counts overall stable/improved     02/23/2025: progressive respiratory failure, noted ongoing RTT this morning with pending intubation and transfer to MICU     02/24/2025: intubated 02/23/2025, sedated, on pressor support, no signs of bleeding, counts noted, no signs of bleeding     02/25/2025: continues in MICU, intubated and sedated, no signs of bleeding    02/26/2025: continues in MICU, intubated, without signs of bleeding, continues on heparin drip     02/27/2025:  remains under the care of the ICU, intubated, no signs of bleeding and continues on heparin drip, counts stable, has not required PRBC support this admission    02/28/2025: continues under the care of MICU, continues intubated, no signs of bleeding, on heparin drip    03/01/2025: continues in MICU, intubated, direct josefina IgG positive, eluate negative, not likely to be clinically significant     03/02/2025:  continues in MICU, nursing staff at bedside, no overnight events noted. CTH without acute intracranial pathology     03/03/2025: continues in MICU, intubated, on heparin drip, 1u PRBC overnight, no signs of bleeding, platelet count stable     03/04/2025: awake, moving arms spontaneously on exam, continues without much interaction however. No signs of bleeding, continues heparin drip, continues intubated in MICU     03/05/2025: extubated 03/04/2025, continues in MICU, awake and alert this morning and able to speak full sentences, discussed/reviewed findings, continues on heparin drip     03/06/2025: nursing staff at bedside, bipap overnight, without signs of bleeding, counts noted stable, renal function improving     03/07/2025:  no signs of bleeding, counts noted, continues heparin drip, continues in MICU    03/08/2025: on HFNC, no signs of bleeding, although alert and answering questions, not back to his baseline yet, continues in MICU    03/09/2025: continues in MICU, s/p re-intubation 03/08/2025 given respiratory compromise in the setting of copious secretions as evidenced by bronchoscopy, in setting of fever, now sedated, mild crusting of blood around mouth, without active sign of bleeding, counts noted reviewed, continues on heparin drip     03/10/2025: continues in MICU, no signs of bleeding, discussed with nursing staff at bedside, receiving 1u PRBC due to H/H downtrend, sputum culture with Pseudomonas, ICU and ID recs noted, continues to be intubated     03/11/2025: continues in MICU, intubated and sedated, noted counts, without signs of bleeding, appropriate response to transfusion    03/12/2025: continues in MICU, intubated and sedated, without signs of bleeding     03/13/2025: continues in MICU, s/p Tracheostomy 03/12/2025 with tracheal intubation, continues with sedation     03/14/2025:  awake and alert, mental status much improved, transferred from MICU to [address] [place] [address] 03/13/2025. Without significant events overnight, discussed with nursing staff at bedside, stated pt did well overnight, no signs of bleeding, no fever noted, medications provided via NGT and he is pending PEG-Tube placement today. His case is complex, noted with repeat need for intubations, discussed with pt, he is aware. Discussed with pts primary Oncologist as well.    03/15/2025: awake and alert, no overnight events noted, discussed with nursing staff, he did well overnight, no fevers, no signs of bleeding endorsed. He nods to information. Counts reviewed overall stable, discussed with him as well. Had PEG placed 03/14/2025 03/16/2025: no overnight events noted, comfortable overnight, nursing staff at bedside, stated pt did well, no signs of bleeding, had a BM. No fever reported. Counts reviewed, stable at this time     03/17/2025: no overnight events noted however continues with need for suctioning due to increase secretions, discussed with overnight staff, mild bleeding likely due to trauma from suctioning and recent tracheostomy placement without evidence of active bleeding otherwise. No fevers noted overnight. Had multiple smaller BMs, without evidence of bleeding, Nephrology recs and reviewed noted for Epo    03/18/2025: without significant overnight events, discussed with team at bedside, no evidence of bleeding, noted cardiology recs to start Eliquis 5mg BID for A.fib, currently on Lovenox, previously did require PRBC this admission but has since been stable, renal function seems to be improving, and Plt count improved, ok for AC per cardiology recs with Eliquis, monitor H/H closely     03/19/2025: comfortable, no overnight events noted, discussed with team, had 1 BM overnight without evidence of bleeding, now on Eliquis 5mg BID per cardiology recs for A.fib, renal function stable. Oxygen requirements stable overnight. No fevers reported. Pts primary Oncologist, Dr. Ovalles, aware, case discussed     03/20/2025: without significant events noted, continues on Eliquis, therapeutic, without signs of bleeding, continues on Epo, counts and imaging reviewed, no overnight events noted, continues with tracheostomy without increased oxygen requirements, increased secretions noted     03/21/2025: without significant overnight events noted. Discussed with overnight team, without evidence of bleeding, fever. Had a BM overnight, normal. Without increasing oxygen requirements, Without seizure like activity given hx of malignancy and cerebellar finding previously. Continues on Eliquis. Counts noted and reviewed, anemia, labile thrombocytopenia     03/22/2025:  no overnight events noted, discussed with overnight staff, pt did well, without increased oxygen requirements, without signs of bleeding. Had one BM overnight, without bleeding. Continues on Eliquis, Renal function is stable and appropriate. Counts and labs reviewed, overall stable however anemia persists.     03/23/2025: no significant overnight events noted, discussed with overnight team, without signs of bleeding. Oxygen requirements stable, pulmonary recs noted. Labs and course reviewed     03/24/2025:  comfortable, without significant overnight events noted, continues with Trach, without increased oxygen requirements, discussed with overnight team, had 1 BM overnight. No signs of bleeding noted, Counts and labs reviewed overall improving/stable. On exam without signs of bleeding, continues on Eliquis 5mg BID       #Acute hypoxic respiratory failure, Acute, Severe   # COVID  - CT noted with bilateral GGO  - ID following  - Pulmonary following  - Antibiotics per primary team/MICU and ID   - Intubated 02/23/2025 AM, MICU   - extubated 03/04/2025  - Pseudomonas from sputum culture   - Re-intubated 03/09/2025 due to increased work of breathing in setting of increased secretions, not protecting airway, tachypnea, hypoxia   - s/p Tracheostomy 03/12/2025    # Thrombocytopenia, Acute, Moderate   - Did occur during most recent admission and improved to normal prior to discharge   - Without signs of bleeding  - Could be multifactorial, possibly due to infection   - Continue to trend  - Transfuse to maintain Plt > 10k unless actively bleeding then maintain > 50k     # Brain lesion Hx of cancer, Acute, Severe   - pt with hx of seizures  - MRI brain now with 5.4 mm enhancing lesion in the LEFT middle cerebellar peduncle with associated edema suspicious for metastases  - MRI Lumbar negative for acute disease  - Small lesion without mass effect or edema, recommended to correlate per neurology if foci and locus are concordant with seizure activity  - Neurology recs noted, new lesion not likely to cause seizure  - It is rare, but nonetheless not impossible, for testicular cancer to be metastatic to the brain  - He will need another PET-CT, can be completed outpatient once stable if concern can proceed with biopsy at that time   - Previous endocrinology eval for thyroid nodule noted  - AFP/BHCH normal,  previously   - Repeat CTH without acute intracranial pathology     # 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 Ovalles, agree with current plan  - Follow up as outpatient with Franki Ovalles MD     # Acute kidney injury on CKD Stage IIIA, Acute, Moderate   - Likely multifactorial  - Nephrology consult and recs noted  - Now on therapeutic Eliquis, will need to be monitoring in high risk setting for bleeding   - Continue to trend     # Normocytic anemia, Acute, Severe   - Chronic, has hx of PRBC during chemo 07/2024  - Noted Anti E, Anti-K Anti-C antibodies previously  - direct josefina IgG positive, eluate negative, not likely to be clinically significant   - s/p 2u PRBC 03/03/2025 and 03/10/2025   - Monitor for bleeding  - Epo ordered  - Recommend to transfuse to maintain Hb > 7    # Klebsiella UTI, Acute, Severe > Moderate   # Pseudomonas UTI, Acute, Moderate   -Antibiotics per ID and primary team       Recommendations  - Trend CBC daily  - Transfuse to maintain Hb > 7   - Transfuse to maintain Plt >10k unless active bleeding then >50k   - Cardiology recs noted, now on Eliquis 5mg BID for A.fib, monitor H/H         Thank you for allowing me to participate in the care of Mr. Gr please do not hesitate to call or text me if you have further questions or concerns.     Brayan Mart MD  [facility] [place]   Division of Hematology/Oncology  [facility], [address]  [place] [address]  P: [phone]  F: [phone]    Attestation:    ----Pt evaluated, >50min spent including face-to-face interaction in addition to chart review, reviewing treatment plan, discussing with care staff in hospital, his outside physician, and managing, reviewing orders, labs and additional time spent documenting, in addition to the patient’s chronic diagnoses as listed in the assessment----

## 2025-03-24 NOTE — PROGRESS NOTE ADULT - ASSESSMENT
69 year old male with a past medical history of hypertension, hyperlipemia VAZQUEZ (on CPAP at bedtime, NC 2 liters during the day), CKD stage 3, epilepsy, schizophrenia, developmental delay, metastatic testicular cancer (s/p orchiectomy, actively on chemotherapy, last dose of etoposide/cisplatin on 6/2024), presenting with acute on chronic hypoxemic respiratory failure, secondary to (a) COVID-19 infection, (b) superimposed pneumonia after recent influenza infection,  Transferred to MICU on 2/23 after RRT on floors due to hypoxia even with max setting AVAPS. Patient started on midodrine and weaned off of levo on 2/24. Failed pressure support on 2/25 and started diuresis with Bumex. CTH was unremarkable and weaned off of Precedex on 3/1. On 3/4 patient extubated and GOC conversations with family revealed that they would want patient to be trached. On 3/7, patient found to have RLL consolidation on POCUS and started on Zosyn, intubated on 3/8 due to increased lethargy and inability to clear oral secretions. Trach placed on 3/12. Transferred to RCU on 3/13. S/p Peg 3/14.  EGD with normal findings. Tolerating TC ATC since 3/16.   TTE performed 3/18 no obvious sign of left atrial appendage. Recommended by Cards and Cleared by heme for Eliquis 5mg BID. H/H stable. Tolerating PMV trials. Failed FEES 3/18. Trach downsized to a 7 Cuffless Portex on 3/20; Fio2 at 28%.       3/22: No events reported overnight. Patient continues to work with Speech for therapeutic swallowing exercises and PT for possible transfer to acute rehab. PMR to re-eval on Monday for final determination. MBS possibly for next week.  3/23-no new issues    3/24: No events reported overnight. Awaiting PMR to re-eval to make final determination for Acute Rehab vs [facility] on discharge. Case d/w speech depending on discharge plan patient may have repeat MBS prior to discharge.  69 year old male with a past medical history of hypertension, hyperlipemia VAZQUEZ (on CPAP at bedtime, NC 2 liters during the day), CKD stage 3, epilepsy, schizophrenia, developmental delay, metastatic testicular cancer (s/p orchiectomy, actively on chemotherapy, last dose of etoposide/cisplatin on 6/2024), presenting with acute on chronic hypoxemic respiratory failure, secondary to (a) COVID-19 infection, (b) superimposed pneumonia after recent influenza infection,  Transferred to MICU on 2/23 after RRT on floors due to hypoxia even with max setting AVAPS. Patient started on midodrine and weaned off of levo on 2/24. Failed pressure support on 2/25 and started diuresis with Bumex. CTH was unremarkable and weaned off of Precedex on 3/1. On 3/4 patient extubated and GOC conversations with family revealed that they would want patient to be trached. On 3/7, patient found to have RLL consolidation on POCUS and started on Zosyn, intubated on 3/8 due to increased lethargy and inability to clear oral secretions. Trach placed on 3/12. Transferred to RCU on 3/13. S/p Peg 3/14.  EGD with normal findings. Tolerating TC ATC since 3/16.   TTE performed 3/18 no obvious sign of left atrial appendage. Recommended by Cards and Cleared by heme for Eliquis 5mg BID. H/H stable. Tolerating PMV trials. Failed FEES 3/18. Trach downsized to a 7 Cuffless Portex on 3/20; Fio2 at 28%.       3/24: No events reported overnight. Awaiting PMR to re-eval to make final determination for Acute Rehab vs [facility] on discharge. Case d/w speech depending on discharge plan patient may have repeat MBS prior to discharge.

## 2025-03-24 NOTE — PROGRESS NOTE ADULT - SUBJECTIVE AND OBJECTIVE BOX
Patient is a 69y old  Male who presents with a chief complaint of Acute on chronic hypoxemic respiratory failure (24 Mar 2025 04:54)      Interval Events: No events reported overnight     REVIEW OF SYSTEMS:  [ ] Positive  [x] All other systems negative  [ ] Unable to assess ROS because _    Vital Signs Last 24 Hrs  T(C): 37 (03-24-25 @ 05:12), Max: 37 (03-24-25 @ 00:24)  T(F): 98.6 (03-24-25 @ 05:12), Max: 98.6 (03-24-25 @ 00:24)  HR: 80 (03-24-25 @ 09:10) (74 - 90)  BP: 136/86 (03-24-25 @ 05:12) (136/86 - 170/98)  RR: 18 (03-24-25 @ 09:10) (16 - 18)  SpO2: 100% (03-24-25 @ 09:10) (95% - 100%)    PHYSICAL EXAM:  HEENT:   [X]Tracheostomy: #7 cuffless Portex   []Pupils equal  [ ]No oral lesions  [ ]Abnormal    SKIN  [ ]No Rash  [X] Abnormal: macerated skin noted on perineum, buttocks, and inner thighs; Lt occipital scalp injury.  [ ] pressure    CARDIAC  [X]Regular  [ ]Abnormal    PULMONARY  [X]Bilateral Clear Breath Sounds  [ ]Normal Excursion  [ ]Abnormal    GI  [X]PEG site c/d/i      [X] +BS		              [X]Soft, nondistended, nontender	  [ ]Abnormal    MUSCULOSKELETAL                                   [X]Bedbound                 [ ]Abnormal    [ ]Ambulatory/OOB to chair                           EXTREMITIES                                         [X]Normal  [ ]Edema                           NEUROLOGIC  [X] Normal, non focal  [ ] Focal findings:    PSYCHIATRIC  [X]Alert and appropriate Mood   [ ] Sedated	 [ ]Agitated    :  [place]: [ ] Yes, if yes: Date of Placement:                   [X] No    LINES: Central Lines [ ] Yes, if yes: Date of Placement                                     [X] No    HOSPITAL MEDICATIONS:  MEDICATIONS  (STANDING):  acetaminophen   IVPB .. 1000 milliGRAM(s) IV Intermittent once  albuterol/ipratropium for Nebulization 3 milliLiter(s) Nebulizer every 6 hours  apixaban 5 milliGRAM(s) Enteral Tube every 12 hours  atorvastatin 20 milliGRAM(s) Oral at bedtime  chlorhexidine 4% Liquid 1 Application(s) Topical daily  cholecalciferol 1000 Unit(s) Oral daily  epoetin krystyna-epbx (RETACRIT) Injectable 36603 Unit(s) SubCutaneous <User Schedule>  escitalopram 15 milliGRAM(s) Oral with breakfast  gabapentin Solution 150 milliGRAM(s) Oral every 12 hours  influenza  Vaccine (HIGH DOSE) 0.5 milliLiter(s) IntraMuscular once  lacosamide Solution 200 milliGRAM(s) Oral two times a day  lamoTRIgine 50 milliGRAM(s) Oral two times a day  lanolin Ointment 1 Application(s) Topical daily  levETIRAcetam  Solution 750 milliGRAM(s) Oral two times a day  lurasidone 20 milliGRAM(s) Oral at bedtime  metoprolol tartrate 25 milliGRAM(s) Oral every 12 hours  nystatin Powder 1 Application(s) Topical every 8 hours  pantoprazole   Suspension 40 milliGRAM(s) Oral daily  polyethylene glycol 3350 17 Gram(s) Oral daily    MEDICATIONS  (PRN):  acetaminophen     Tablet .. 650 milliGRAM(s) Oral every 6 hours PRN Temp greater or equal to 38C (100.4F), Mild Pain (1 - 3)  aluminum hydroxide/magnesium hydroxide/simethicone Suspension 30 milliLiter(s) Oral every 4 hours PRN Dyspepsia  artificial tears (preservative free) Ophthalmic Solution 1 Drop(s) Both EYES every 6 hours PRN Dry Eyes  melatonin 3 milliGRAM(s) Oral at bedtime PRN Insomnia  ondansetron Injectable 4 milliGRAM(s) IV Push every 8 hours PRN Nausea and/or Vomiting      LABS:                        9.4    7.55  )-----------( 203      ( 24 Mar 2025 06:08 )             30.2     03-24    139  |  100  |  23  ----------------------------<  106[H]  4.9   |  27  |  1.44[H]    Ca    9.3      24 Mar 2025 06:08  Phos  3.4     03-24  Mg     1.9     03-24        Urinalysis Basic - ( 24 Mar 2025 06:08 )    Color: x / Appearance: x / SG: x / pH: x  Gluc: 106 mg/dL / Ketone: x  / Bili: x / Urobili: x   Blood: x / Protein: x / Nitrite: x   Leuk Esterase: x / RBC: x / WBC x   Sq Epi: x / Non Sq Epi: x / Bacteria: x          CAPILLARY BLOOD GLUCOSE    MICROBIOLOGY:     RADIOLOGY:  [ ] Reviewed and interpreted by me

## 2025-03-24 NOTE — PROGRESS NOTE ADULT - PROBLEM SELECTOR PLAN 10
- Full Code  - Contact: Sister Ester [phone]  - PMR follow up on Monday to determine [facility] vs Acute Rehab upon discharge - Full Code  - Contact: Sister Ester [phone]  - PMR follow up on tomorrow to determine [facility] vs Acute Rehab upon discharge  - Patient and patients sister updated at bedside this afternoon

## 2025-03-24 NOTE — PROGRESS NOTE ADULT - PROBLEM SELECTOR PLAN 7
- S/p Peg placement 3/14. EGD with normal findings  - Tolerating feeds at goal rate  - Repeat FEES 3/18: Not cleared for enteral diet  - Speech will continue to follow for therapeutic exercises   - Continue Miralax - S/p Peg placement 3/14. EGD with normal findings  - Tolerating feeds at goal rate  - Repeat FEES 3/18: Not cleared for enteral diet  - Speech will continue to follow for therapeutic exercises   - Miralax dcd in setting of loose stools

## 2025-03-24 NOTE — PROGRESS NOTE ADULT - SUBJECTIVE AND OBJECTIVE BOX
[facility]   [phone]  RENAL FOLLOW UP NOTE  --------------------------------------------------------------------------------  Chief Complaint:    24 hour events/subjective:    seen earlier   denies worsening sob   has trach collar     PAST HISTORY  --------------------------------------------------------------------------------  No significant changes to PMH, PSH, FHx, SHx, unless otherwise noted    ALLERGIES & MEDICATIONS  --------------------------------------------------------------------------------  Allergies    seasonal allergies (Unknown)  No Known Drug Allergies    Intolerances    latex (Rash)    Standing Inpatient Medications  acetaminophen   IVPB .. 1000 milliGRAM(s) IV Intermittent once  albuterol/ipratropium for Nebulization 3 milliLiter(s) Nebulizer every 6 hours  apixaban 5 milliGRAM(s) Enteral Tube every 12 hours  atorvastatin 20 milliGRAM(s) Oral at bedtime  chlorhexidine 4% Liquid 1 Application(s) Topical daily  cholecalciferol 1000 Unit(s) Oral daily  epoetin krystyna-epbx (RETACRIT) Injectable 33424 Unit(s) SubCutaneous <User Schedule>  escitalopram 15 milliGRAM(s) Oral with breakfast  gabapentin Solution 150 milliGRAM(s) Oral every 12 hours  influenza  Vaccine (HIGH DOSE) 0.5 milliLiter(s) IntraMuscular once  lacosamide Solution 200 milliGRAM(s) Oral two times a day  lamoTRIgine 50 milliGRAM(s) Oral two times a day  lanolin Ointment 1 Application(s) Topical daily  levETIRAcetam  Solution 750 milliGRAM(s) Oral two times a day  lurasidone 20 milliGRAM(s) Oral at bedtime  metoprolol tartrate 25 milliGRAM(s) Oral every 12 hours  nystatin Powder 1 Application(s) Topical every 8 hours  pantoprazole   Suspension 40 milliGRAM(s) Oral daily    PRN Inpatient Medications  acetaminophen     Tablet .. 650 milliGRAM(s) Oral every 6 hours PRN  aluminum hydroxide/magnesium hydroxide/simethicone Suspension 30 milliLiter(s) Oral every 4 hours PRN  artificial tears (preservative free) Ophthalmic Solution 1 Drop(s) Both EYES every 6 hours PRN  melatonin 3 milliGRAM(s) Oral at bedtime PRN  ondansetron Injectable 4 milliGRAM(s) IV Push every 8 hours PRN      REVIEW OF SYSTEMS  --------------------------------------------------------------------------------    Gen: denies  fevers/chills, or HA or dizziness   CVS: denies chest pain/palpitations  Resp: denies SOB/Cough +   GI: Denies N/V/Abd pain  : Denies dysuria    VITALS/PHYSICAL EXAM  --------------------------------------------------------------------------------  T(C): 36.6 (03-24-25 @ 19:01), Max: 37.2 (03-24-25 @ 11:15)  HR: 75 (03-24-25 @ 21:25) (75 - 97)  BP: 143/89 (03-24-25 @ 19:01) (136/86 - 170/98)  RR: 19 (03-24-25 @ 21:25) (16 - 19)  SpO2: 97% (03-24-25 @ 21:25) (95% - 100%)  Wt(kg): --        03-23-25 @ 07:01  -  03-24-25 @ 07:00  --------------------------------------------------------  IN: 1310 mL / OUT: 1400 mL / NET: -90 mL    03-24-25 @ 07:01  -  03-24-25 @ 22:40  --------------------------------------------------------  IN: 870 mL / OUT: 400 mL / NET: 470 mL      Physical Exam:  	        Gen trach collar communicative   	Pulm: decrease bs, +coarse bs    	CV: No JVD. RRR, S1S2; no rub  	Abd: +BS, soft, nondistended, Peg  	UE: Warm, no cyanosis  no clubbing,  no edema;  	LE: Warm, no cyanosis  no clubbing, no edema    LABS/STUDIES  --------------------------------------------------------------------------------              9.4    7.55  >-----------<  203      [03-24-25 @ 06:08]              30.2     139  |  100  |  23  ----------------------------<  106      [03-24-25 @ 06:08]  4.9   |  27  |  1.44        Ca     9.3     [03-24-25 @ 06:08]      Mg     1.9     [03-24-25 @ 06:08]      Phos  3.4     [03-24-25 @ 06:08]            Creatinine Trend:  SCr 1.44 [03-24 @ 06:08]  SCr 1.43 [03-23 @ 07:25]  SCr 1.48 [03-22 @ 07:01]  SCr 1.42 [03-21 @ 07:00]  SCr 1.43 [03-20 @ 07:16]          Ferritin [id number]      [02-23-25 @ 06:15]  TSH 0.87      [01-25-25 @ 11:31]

## 2025-03-24 NOTE — PROGRESS NOTE ADULT - PROBLEM SELECTOR PLAN 8
- SHAGUFTA on CKD stage 3 suspected in setting of new infection Covid 19   - CR peaked to 4.34 on 3/3, now improving   - Renvela d/cd Hyperphosphatemia resolved   - Continue Retacrit [id number] U tiw sq

## 2025-03-25 LAB
ANION GAP SERPL CALC-SCNC: 11 MMOL/L — SIGNIFICANT CHANGE UP (ref 5–17)
BUN SERPL-MCNC: 26 MG/DL — HIGH (ref 7–23)
CALCIUM SERPL-MCNC: 9.4 MG/DL — SIGNIFICANT CHANGE UP (ref 8.4–10.5)
CHLORIDE SERPL-SCNC: 100 MMOL/L — SIGNIFICANT CHANGE UP (ref 96–108)
CO2 SERPL-SCNC: 28 MMOL/L — SIGNIFICANT CHANGE UP (ref 22–31)
CREAT SERPL-MCNC: 1.55 MG/DL — HIGH (ref 0.5–1.3)
EGFR: 49 ML/MIN/1.73M2 — LOW
EGFR: 49 ML/MIN/1.73M2 — LOW
GLUCOSE SERPL-MCNC: 120 MG/DL — HIGH (ref 70–99)
HCT VFR BLD CALC: 30.1 % — LOW (ref 39–50)
HGB BLD-MCNC: 9.3 G/DL — LOW (ref 13–17)
MAGNESIUM SERPL-MCNC: 2 MG/DL — SIGNIFICANT CHANGE UP (ref 1.6–2.6)
MCHC RBC-ENTMCNC: 30.9 G/DL — LOW (ref 32–36)
MCHC RBC-ENTMCNC: 31.3 PG — SIGNIFICANT CHANGE UP (ref 27–34)
MCV RBC AUTO: 101.3 FL — HIGH (ref 80–100)
NRBC BLD AUTO-RTO: 0 /100 WBCS — SIGNIFICANT CHANGE UP (ref 0–0)
PHOSPHATE SERPL-MCNC: 3.7 MG/DL — SIGNIFICANT CHANGE UP (ref 2.5–4.5)
PLATELET # BLD AUTO: 178 K/UL — SIGNIFICANT CHANGE UP (ref 150–400)
POTASSIUM SERPL-MCNC: 4.8 MMOL/L — SIGNIFICANT CHANGE UP (ref 3.5–5.3)
POTASSIUM SERPL-SCNC: 4.8 MMOL/L — SIGNIFICANT CHANGE UP (ref 3.5–5.3)
RBC # BLD: 2.97 M/UL — LOW (ref 4.2–5.8)
RBC # FLD: 18.1 % — HIGH (ref 10.3–14.5)
SODIUM SERPL-SCNC: 139 MMOL/L — SIGNIFICANT CHANGE UP (ref 135–145)
WBC # BLD: 7.32 K/UL — SIGNIFICANT CHANGE UP (ref 3.8–10.5)
WBC # FLD AUTO: 7.32 K/UL — SIGNIFICANT CHANGE UP (ref 3.8–10.5)

## 2025-03-25 PROCEDURE — 99232 SBSQ HOSP IP/OBS MODERATE 35: CPT

## 2025-03-25 PROCEDURE — 99233 SBSQ HOSP IP/OBS HIGH 50: CPT | Mod: FS

## 2025-03-25 RX ADMIN — LACOSAMIDE 200 MILLIGRAM(S): 150 TABLET, FILM COATED ORAL at 05:57

## 2025-03-25 RX ADMIN — NYSTATIN 1 APPLICATION(S): 100000 CREAM TOPICAL at 05:58

## 2025-03-25 RX ADMIN — IPRATROPIUM BROMIDE AND ALBUTEROL SULFATE 3 MILLILITER(S): .5; 2.5 SOLUTION RESPIRATORY (INHALATION) at 17:55

## 2025-03-25 RX ADMIN — IPRATROPIUM BROMIDE AND ALBUTEROL SULFATE 3 MILLILITER(S): .5; 2.5 SOLUTION RESPIRATORY (INHALATION) at 11:13

## 2025-03-25 RX ADMIN — ATORVASTATIN CALCIUM 20 MILLIGRAM(S): 80 TABLET, FILM COATED ORAL at 22:17

## 2025-03-25 RX ADMIN — NYSTATIN 1 APPLICATION(S): 100000 CREAM TOPICAL at 13:20

## 2025-03-25 RX ADMIN — LEVETIRACETAM 750 MILLIGRAM(S): 10 INJECTION, SOLUTION INTRAVENOUS at 17:20

## 2025-03-25 RX ADMIN — Medication 1000 UNIT(S): at 12:26

## 2025-03-25 RX ADMIN — GABAPENTIN 150 MILLIGRAM(S): 400 CAPSULE ORAL at 05:57

## 2025-03-25 RX ADMIN — Medication 1 APPLICATION(S): at 12:43

## 2025-03-25 RX ADMIN — IPRATROPIUM BROMIDE AND ALBUTEROL SULFATE 3 MILLILITER(S): .5; 2.5 SOLUTION RESPIRATORY (INHALATION) at 05:44

## 2025-03-25 RX ADMIN — Medication 1 APPLICATION(S): at 12:28

## 2025-03-25 RX ADMIN — LURASIDONE HYDROCHLORIDE 20 MILLIGRAM(S): 120 TABLET, FILM COATED ORAL at 22:17

## 2025-03-25 RX ADMIN — LACOSAMIDE 200 MILLIGRAM(S): 150 TABLET, FILM COATED ORAL at 17:20

## 2025-03-25 RX ADMIN — METOPROLOL SUCCINATE 25 MILLIGRAM(S): 50 TABLET, EXTENDED RELEASE ORAL at 05:57

## 2025-03-25 RX ADMIN — IPRATROPIUM BROMIDE AND ALBUTEROL SULFATE 3 MILLILITER(S): .5; 2.5 SOLUTION RESPIRATORY (INHALATION) at 00:09

## 2025-03-25 RX ADMIN — EPOETIN ALFA 10000 UNIT(S): 10000 SOLUTION INTRAVENOUS; SUBCUTANEOUS at 12:45

## 2025-03-25 RX ADMIN — LAMOTRIGINE 50 MILLIGRAM(S): 150 TABLET ORAL at 17:20

## 2025-03-25 RX ADMIN — APIXABAN 5 MILLIGRAM(S): 2.5 TABLET, FILM COATED ORAL at 05:56

## 2025-03-25 RX ADMIN — APIXABAN 5 MILLIGRAM(S): 2.5 TABLET, FILM COATED ORAL at 17:19

## 2025-03-25 RX ADMIN — IPRATROPIUM BROMIDE AND ALBUTEROL SULFATE 3 MILLILITER(S): .5; 2.5 SOLUTION RESPIRATORY (INHALATION) at 23:21

## 2025-03-25 RX ADMIN — Medication 40 MILLIGRAM(S): at 12:27

## 2025-03-25 RX ADMIN — LEVETIRACETAM 750 MILLIGRAM(S): 10 INJECTION, SOLUTION INTRAVENOUS at 05:57

## 2025-03-25 RX ADMIN — NYSTATIN 1 APPLICATION(S): 100000 CREAM TOPICAL at 22:17

## 2025-03-25 RX ADMIN — LAMOTRIGINE 50 MILLIGRAM(S): 150 TABLET ORAL at 05:56

## 2025-03-25 RX ADMIN — METOPROLOL SUCCINATE 25 MILLIGRAM(S): 50 TABLET, EXTENDED RELEASE ORAL at 17:20

## 2025-03-25 RX ADMIN — GABAPENTIN 150 MILLIGRAM(S): 400 CAPSULE ORAL at 17:19

## 2025-03-25 RX ADMIN — ESCITALOPRAM OXALATE 15 MILLIGRAM(S): 20 TABLET ORAL at 05:56

## 2025-03-25 NOTE — PROGRESS NOTE ADULT - NS ATTEND AMEND GEN_ALL_CORE FT
69M with a h/o metastatic testicular cancer, epilepsy on AEDs, chronic respiratory failure with hypoxia and hypercapnia on home O2, VAZQUEZ on CPAP, HFpEF admitted for acute on chronic respiratory failure with hypoxia and hypercapnia likely secondary to combination of COVID PNA, bacterial superinfection, and acute pulmonary edema due to acute on chronic HFpEF. Hypotension likely severe sepsis with septic shock. SHAGUFTA likely ATN +/- venous congestion. Repeat CT chest with bilateral GGOs possibly acute pulmonary edema vs early fibrosis vs residual COVID along with L basilar consolidation likely bacterial PNA vs atelectasis. Now s/p trach and PEG.    # Acute on chronic respiratory failure with hypoxia and hypercapnia  # COVID PNA  # Bacterial PNA  # Acute pulmonary edema  # Acute on chronic HFpEF  # Hypotension  # Severe sepsis with septic shock  # SHAGUFTA  # Hypernatremia  # Epilepsy  Clinically much improved, interactive and phonating  - Continue AEDs  - Tolerating TC and PMV   FEN- Trend Cr, avoid nephrotoxins, - Hypernatremia resolved  ID- Completed Levaquin course. Completed course of dexamethasone and remdesivir for COVID  GI- c/w PEG feeds, e/o aspiration on FEES testing on 3/18--plan for MBS this week. Tolerating tube feeds however with noted loose BMS- will dc miralax. Follow up nutrition recs  CV- Afib- rate controlled, tolerating Eliquis. TTE without evidence of LA thrombus, unable to assess Left atrial appendage  -OOB to chair, PT and OT follow up appreciated. Potential dc to acute rehab this week.   - Full code. 69M with a h/o metastatic testicular cancer, epilepsy on AEDs, chronic respiratory failure with hypoxia and hypercapnia on home O2, VAZQUEZ on CPAP, HFpEF admitted for acute on chronic respiratory failure with hypoxia and hypercapnia likely secondary to combination of COVID PNA, bacterial superinfection, and acute pulmonary edema due to acute on chronic HFpEF. Hypotension likely severe sepsis with septic shock. SHAGUFTA likely ATN +/- venous congestion. Repeat CT chest with bilateral GGOs possibly acute pulmonary edema vs early fibrosis vs residual COVID along with L basilar consolidation likely bacterial PNA vs atelectasis. Now s/p trach and PEG.    # Acute on chronic respiratory failure with hypoxia and hypercapnia  # COVID PNA  # Bacterial PNA  # Acute pulmonary edema  # Acute on chronic HFpEF  # Hypotension  # Severe sepsis with septic shock  # SHAGUFTA  # Hypernatremia  # Epilepsy  Clinically much improved, interactive and phonating  - Continue AEDs  - Tolerating TC and PMV   FEN- Trend Cr, avoid nephrotoxins, - Hypernatremia resolved  ID- Completed Levaquin course. Completed course of dexamethasone and remdesivir for COVID  GI- c/w PEG feeds, e/o aspiration on FEES testing on 3/18. Tolerating tube feeds however with noted loose BMS- will dc miralax and change to vital. Pending MBS this week.  CV- Afib- rate controlled, tolerating Eliquis. TTE without evidence of LA thrombus, unable to assess Left atrial appendage  -OOB to chair, PT and OT follow up appreciated. Potential dc to acute rehab this week.   - Full code.

## 2025-03-25 NOTE — PROGRESS NOTE ADULT - PROBLEM SELECTOR PLAN 7
- S/p Peg placement 3/14. EGD with normal findings  - Tolerating feeds at goal rate  - Repeat FEES 3/18: Not cleared for enteral diet  - Speech will continue to follow for therapeutic exercises   - Miralax dcd in setting of loose stools

## 2025-03-25 NOTE — PROGRESS NOTE ADULT - SUBJECTIVE AND OBJECTIVE BOX
DATE OF SERVICE: 03-25-25 @ 14:22    Patient is a 69y old  Male who presents with a chief complaint of Acute on chronic hypoxemic respiratory failure (25 Mar 2025 10:05)      INTERVAL HISTORY: In no acute distress.     REVIEW OF SYSTEMS:  CONSTITUTIONAL: No weakness  EYES/ENT: No visual changes;  No throat pain   NECK: No pain or stiffness  RESPIRATORY: No cough, wheezing; No shortness of breath  CARDIOVASCULAR: No chest pain or palpitations  GASTROINTESTINAL: No abdominal  pain. No nausea, vomiting, or hematemesis  GENITOURINARY: No dysuria, frequency or hematuria  NEUROLOGICAL: No stroke like symptoms  SKIN: No rashes    	  MEDICATIONS:  metoprolol tartrate 25 milliGRAM(s) Oral every 12 hours        PHYSICAL EXAM:  T(C): 36.4 (03-25-25 @ 12:19), Max: 36.6 (03-24-25 @ 19:01)  HR: 73 (03-25-25 @ 12:19) (68 - 97)  BP: 148/80 (03-25-25 @ 12:19) (143/89 - 155/90)  RR: 18 (03-25-25 @ 12:19) (18 - 19)  SpO2: 98% (03-25-25 @ 12:19) (94% - 100%)  Wt(kg): --  I&O's Summary    24 Mar 2025 07:01  -  25 Mar 2025 07:00  --------------------------------------------------------  IN: 1710 mL / OUT: 1000 mL / NET: 710 mL          Appearance: In no distress	  HEENT:    PERRL, EOMI	  Cardiovascular:  S1 S2, No JVD  Respiratory: Lungs clear to auscultation	  Gastrointestinal:  Soft, Non-tender, + BS	  Vascularature:  No edema of LE  Psychiatric: Appropriate affect   Neuro: no acute focal deficits                               9.3    7.32  )-----------( 178      ( 25 Mar 2025 07:03 )             30.1     03-25    139  |  100  |  26[H]  ----------------------------<  120[H]  4.8   |  28  |  1.55[H]    Ca    9.4      25 Mar 2025 07:03  Phos  3.7     03-25  Mg     2.0     03-25          Labs personally reviewed      ASSESSMENT/PLAN: 	        69 year old male with a past medical history of hypertension, hyperlipemia VAZQUEZ (on CPAP at bedtime, NC 2 liters during the day), CKD stage 3, epilepsy, schizophrenia, developmental delay, metastatic testicular cancer (s/p orchiectomy, actively on chemotherapy, last dose of etoposide/cisplatin on 6/2024), presenting with acute on chronic hypoxemic respiratory failure, secondary to (a) COVID-19 infection, (b) superimposed pneumonia after recent influenza infection, and/or (c) fluid overload.     Problem/Plan - 1:  ·  Problem: Acute on chronic respiratory failure with hypoxia.   ·  Plan: Likely 2/2 PNA, HF  - Appreciate pulmonology.  - s/p trach 3/12  - Transferred to RCU    Problem/Plan - 2:  ·  Problem: SHAGUFTA (acute kidney injury).   ·  Plan: Has a history of CKD stage 3, Cr 2.38 at baseline.    - Nephrology following  - BUN now improved following de-escalation of diuretics    Problem/Plan - 3:  ·  Problem: Chronic heart failure with preserved ejection fraction.   ·  Plan: TTE done here 1/17/25 technically difficult, but LV systolic fxn appears nl without any regional wall motion abnormalities  - Euvolemic, repeat BNP. On 2/23 2300  - TTE 3/17 1. No obvious Left atrial thrombus; unable to evaluate left atrial appendage on TTE 2. Compared to the transthoracic echocardiogram performed on 2/23/2025, LA difficult to compare.    Problem/Plan - 4:  ·  Problem: New onset Afib RVR (on tele, confirmed with EKG 2/19)   ·  Plan:  - Cont Metoprolol 25mg BID  - Cont Eliquis 5mg BID      Problem/Plan - 5:  ·  Problem: HTN    ·  Plan: Resolved  - 3/19 slightly above target. Will cont to monitor  - 3/24 improved, last 136/86          Magaly Laird, REILLY-NP   [facility] Andrew,  [facility]  Cardiovascular Medicine  [address], [address]  Available through call or text on [employer]  Office: [phone]

## 2025-03-25 NOTE — PROGRESS NOTE ADULT - ASSESSMENT
69 year old male with a past medical history of hypertension, hyperlipemia VAZQUEZ (on CPAP at bedtime, NC 2 liters during the day), CKD stage 3, epilepsy, schizophrenia, developmental delay, metastatic testicular cancer (s/p orchiectomy, actively on chemotherapy, last dose of etoposide/cisplatin on 6/2024), presenting with acute on chronic hypoxemic respiratory failure, secondary to (a) COVID-19 infection, (b) superimposed pneumonia after recent influenza infection,  Transferred to MICU on 2/23 after RRT on floors due to hypoxia even with max setting AVAPS. Patient started on midodrine and weaned off of levo on 2/24. Failed pressure support on 2/25 and started diuresis with Bumex. CTH was unremarkable and weaned off of Precedex on 3/1. On 3/4 patient extubated and GOC conversations with family revealed that they would want patient to be trached. On 3/7, patient found to have RLL consolidation on POCUS and started on Zosyn, intubated on 3/8 due to increased lethargy and inability to clear oral secretions. Trach placed on 3/12. Transferred to RCU on 3/13. S/p Peg 3/14.  EGD with normal findings. Tolerating TC ATC since 3/16.   TTE performed 3/18 no obvious sign of left atrial appendage. Recommended by Cards and Cleared by heme for Eliquis 5mg BID. H/H stable. Tolerating PMV trials. Failed FEES 3/18. Trach downsized to a 7 Cuffless Portex on 3/20; Fio2 at 28%.       3/24: No events reported overnight. Awaiting PMR to re-eval to make final determination for Acute Rehab vs [facility] on discharge. Case d/w speech depending on discharge plan patient may have repeat MBS prior to discharge.  69 year old male with a past medical history of hypertension, hyperlipemia VAZQUEZ (on CPAP at bedtime, NC 2 liters during the day), CKD stage 3, epilepsy, schizophrenia, developmental delay, metastatic testicular cancer (s/p orchiectomy, actively on chemotherapy, last dose of etoposide/cisplatin on 6/2024), presenting with acute on chronic hypoxemic respiratory failure, secondary to (a) COVID-19 infection, (b) superimposed pneumonia after recent influenza infection,  Transferred to MICU on 2/23 after RRT on floors due to hypoxia even with max setting AVAPS. Patient started on midodrine and weaned off of levo on 2/24. Failed pressure support on 2/25 and started diuresis with Bumex. CTH was unremarkable and weaned off of Precedex on 3/1. On 3/4 patient extubated and GOC conversations with family revealed that they would want patient to be trached. On 3/7, patient found to have RLL consolidation on POCUS and started on Zosyn, intubated on 3/8 due to increased lethargy and inability to clear oral secretions. Trach placed on 3/12. Transferred to RCU on 3/13. S/p Peg 3/14.  EGD with normal findings. Tolerating TC ATC since 3/16.   TTE performed 3/18 no obvious sign of left atrial appendage. Recommended by Cards and Cleared by heme for Eliquis 5mg BID. H/H stable. Tolerating PMV trials. Failed FEES 3/18. Trach downsized to a 7 Cuffless Portex on 3/20; Fio2 at 28%.       3/25: Patient now recc by PMR for Acute Rehab. [facility] [place] tomorrow. Speech provided therapeutic exercises today, tolerating. Sister given medical update at bedside today.

## 2025-03-25 NOTE — PROGRESS NOTE ADULT - SUBJECTIVE AND OBJECTIVE BOX
Patient is a 69y old  Male who presents with a chief complaint of Acute on chronic hypoxemic respiratory failure (25 Mar 2025 04:59)      Interval Events:    REVIEW OF SYSTEMS:  [ ] Positive  [ ] All other systems negative  [ ] Unable to assess ROS because ________    Vital Signs Last 24 Hrs  T(C): 36.4 (03-25-25 @ 04:39), Max: 37.2 (03-24-25 @ 11:15)  T(F): 97.6 (03-25-25 @ 04:39), Max: 98.9 (03-24-25 @ 11:15)  HR: 76 (03-25-25 @ 06:02) (73 - 97)  BP: 145/79 (03-25-25 @ 04:39) (143/89 - 156/91)  RR: 18 (03-25-25 @ 04:39) (18 - 19)  SpO2: 97% (03-25-25 @ 06:02) (94% - 100%)PHYSICAL EXAM:  HEENT:   [ ]Tracheostomy:  [ ]Pupils equal  [ ]No oral lesions  [ ]Abnormal        SKIN  [ ]No Rash  [ ] Abnormal  [ ] pressure    CARDIAC  [ ]Regular  [ ]Abnormal    PULMONARY  [ ]Bilateral Clear Breath Sounds  [ ]Normal Excursion  [ ]Abnormal    GI  [ ]PEG      [ ] +BS		              [ ]Soft, nondistended, nontender	  [ ]Abnormal    MUSCULOSKELETAL                                   [ ]Bedbound                 [ ]Abnormal    [ ]Ambulatory/OOB to chair                           EXTREMITIES                                         [ ]Normal  [ ]Edema                           NEUROLOGIC  [ ] Normal, non focal  [ ] Focal findings:    PSYCHIATRIC  [ ]Alert and appropriate  [ ] Sedated	 [ ]Agitated    :  [place]: [ ] Yes, if yes: Date of Placement:                   [  ] No    LINES: Central Lines [ ] Yes, if yes: Date of Placement                                     [  ] No    HOSPITAL MEDICATIONS:  MEDICATIONS  (STANDING):  acetaminophen   IVPB .. 1000 milliGRAM(s) IV Intermittent once  albuterol/ipratropium for Nebulization 3 milliLiter(s) Nebulizer every 6 hours  apixaban 5 milliGRAM(s) Enteral Tube every 12 hours  atorvastatin 20 milliGRAM(s) Oral at bedtime  chlorhexidine 4% Liquid 1 Application(s) Topical daily  cholecalciferol 1000 Unit(s) Oral daily  epoetin krystyna-epbx (RETACRIT) Injectable 21056 Unit(s) SubCutaneous <User Schedule>  escitalopram 15 milliGRAM(s) Oral with breakfast  gabapentin Solution 150 milliGRAM(s) Oral every 12 hours  influenza  Vaccine (HIGH DOSE) 0.5 milliLiter(s) IntraMuscular once  lacosamide Solution 200 milliGRAM(s) Oral two times a day  lamoTRIgine 50 milliGRAM(s) Oral two times a day  lanolin Ointment 1 Application(s) Topical daily  levETIRAcetam  Solution 750 milliGRAM(s) Oral two times a day  lurasidone 20 milliGRAM(s) Oral at bedtime  metoprolol tartrate 25 milliGRAM(s) Oral every 12 hours  nystatin Powder 1 Application(s) Topical every 8 hours  pantoprazole   Suspension 40 milliGRAM(s) Oral daily    MEDICATIONS  (PRN):  acetaminophen     Tablet .. 650 milliGRAM(s) Oral every 6 hours PRN Temp greater or equal to 38C (100.4F), Mild Pain (1 - 3)  aluminum hydroxide/magnesium hydroxide/simethicone Suspension 30 milliLiter(s) Oral every 4 hours PRN Dyspepsia  artificial tears (preservative free) Ophthalmic Solution 1 Drop(s) Both EYES every 6 hours PRN Dry Eyes  melatonin 3 milliGRAM(s) Oral at bedtime PRN Insomnia  ondansetron Injectable 4 milliGRAM(s) IV Push every 8 hours PRN Nausea and/or Vomiting      LABS:                        9.3    7.32  )-----------( 178      ( 25 Mar 2025 07:03 )             30.1     03-25    139  |  100  |  26[H]  ----------------------------<  120[H]  4.8   |  28  |  1.55[H]    Ca    9.4      25 Mar 2025 07:03  Phos  3.7     03-25  Mg     2.0     03-25        Urinalysis Basic - ( 25 Mar 2025 07:03 )    Color: x / Appearance: x / SG: x / pH: x  Gluc: 120 mg/dL / Ketone: x  / Bili: x / Urobili: x   Blood: x / Protein: x / Nitrite: x   Leuk Esterase: x / RBC: x / WBC x   Sq Epi: x / Non Sq Epi: x / Bacteria: x          CAPILLARY BLOOD GLUCOSE    MICROBIOLOGY:     RADIOLOGY:  [ ] Reviewed and interpreted by me     Patient is a 69y old  Male who presents with a chief complaint of Acute on chronic hypoxemic respiratory failure (25 Mar 2025 04:59)      Interval Events:  Pending MBS                          Stable on TC 28%     REVIEW OF SYSTEMS:  [x ] Positive- Did not sleep well   [ ] All other systems negative  [ ] Unable to assess ROS because ________    Vital Signs Last 24 Hrs  T(C): 36.4 (03-25-25 @ 04:39), Max: 37.2 (03-24-25 @ 11:15)  T(F): 97.6 (03-25-25 @ 04:39), Max: 98.9 (03-24-25 @ 11:15)  HR: 76 (03-25-25 @ 06:02) (73 - 97)  BP: 145/79 (03-25-25 @ 04:39) (143/89 - 156/91)  RR: 18 (03-25-25 @ 04:39) (18 - 19)  SpO2: 97% (03-25-25 @ 06:02) (94% - 100%)    PHYSICAL EXAM:    HEENT:   [X]Tracheostomy: #7 cuffless Portex   []Pupils equal  [ ]No oral lesions  [ ]Abnormal    SKIN  [ ]No Rash  [X] Abnormal: macerated skin noted on perineum, buttocks, and inner thighs; Lt occipital scalp injury.  [ ] pressure    CARDIAC  [X]Regular  [ ]Abnormal    PULMONARY  [X]Bilateral Clear Breath Sounds  [ ]Normal Excursion  [ ]Abnormal    GI  [X]PEG site c/d/i      [X] +BS		              [X]Soft, nondistended, nontender	  [ ]Abnormal    MUSCULOSKELETAL                                   [X]Bedbound                 [ ]Abnormal    [ ]Ambulatory/OOB to chair                           EXTREMITIES                                         [X]Normal  [ ]Edema                           NEUROLOGIC  [X] Normal, non focal  [ ] Focal findings:    PSYCHIATRIC  [X]Alert and appropriate Mood   [ ] Sedated	 [ ]Agitated    :  [place]: [ ] Yes, if yes: Date of Placement:                   [X] No    LINES: Central Lines [ ] Yes, if yes: Date of Placement                                     [X] No    HOSPITAL MEDICATIONS:  MEDICATIONS  (STANDING):  acetaminophen   IVPB .. 1000 milliGRAM(s) IV Intermittent once  albuterol/ipratropium for Nebulization 3 milliLiter(s) Nebulizer every 6 hours  apixaban 5 milliGRAM(s) Enteral Tube every 12 hours  atorvastatin 20 milliGRAM(s) Oral at bedtime  chlorhexidine 4% Liquid 1 Application(s) Topical daily  cholecalciferol 1000 Unit(s) Oral daily  epoetin krystyna-epbx (RETACRIT) Injectable 70976 Unit(s) SubCutaneous <User Schedule>  escitalopram 15 milliGRAM(s) Oral with breakfast  gabapentin Solution 150 milliGRAM(s) Oral every 12 hours  influenza  Vaccine (HIGH DOSE) 0.5 milliLiter(s) IntraMuscular once  lacosamide Solution 200 milliGRAM(s) Oral two times a day  lamoTRIgine 50 milliGRAM(s) Oral two times a day  lanolin Ointment 1 Application(s) Topical daily  levETIRAcetam  Solution 750 milliGRAM(s) Oral two times a day  lurasidone 20 milliGRAM(s) Oral at bedtime  metoprolol tartrate 25 milliGRAM(s) Oral every 12 hours  nystatin Powder 1 Application(s) Topical every 8 hours  pantoprazole   Suspension 40 milliGRAM(s) Oral daily    MEDICATIONS  (PRN):  acetaminophen     Tablet .. 650 milliGRAM(s) Oral every 6 hours PRN Temp greater or equal to 38C (100.4F), Mild Pain (1 - 3)  aluminum hydroxide/magnesium hydroxide/simethicone Suspension 30 milliLiter(s) Oral every 4 hours PRN Dyspepsia  artificial tears (preservative free) Ophthalmic Solution 1 Drop(s) Both EYES every 6 hours PRN Dry Eyes  melatonin 3 milliGRAM(s) Oral at bedtime PRN Insomnia  ondansetron Injectable 4 milliGRAM(s) IV Push every 8 hours PRN Nausea and/or Vomiting      LABS:                        9.3    7.32  )-----------( 178      ( 25 Mar 2025 07:03 )             30.1     03-25    139  |  100  |  26[H]  ----------------------------<  120[H]  4.8   |  28  |  1.55[H]    Ca    9.4      25 Mar 2025 07:03  Phos  3.7     03-25  Mg     2.0     03-25        Urinalysis Basic - ( 25 Mar 2025 07:03 )    Color: x / Appearance: x / SG: x / pH: x  Gluc: 120 mg/dL / Ketone: x  / Bili: x / Urobili: x   Blood: x / Protein: x / Nitrite: x   Leuk Esterase: x / RBC: x / WBC x   Sq Epi: x / Non Sq Epi: x / Bacteria: x          CAPILLARY BLOOD GLUCOSE    MICROBIOLOGY:     RADIOLOGY:  [ ] Reviewed and interpreted by me

## 2025-03-25 NOTE — PROGRESS NOTE ADULT - SUBJECTIVE AND OBJECTIVE BOX
Patient is a 69y old  Male who presents with a chief complaint of Acute on chronic hypoxemic respiratory failure (18 Mar 2025 09:14)    Patient has been making good progress with therapies.  Min-Mod A transfers.  Resting in bed and comfortable. Denies pain. No CP, no SOB, no N/V.    MEDICATIONS  (STANDING):  acetaminophen   IVPB .. 1000 milliGRAM(s) IV Intermittent once  albuterol/ipratropium for Nebulization 3 milliLiter(s) Nebulizer every 6 hours  apixaban 5 milliGRAM(s) Enteral Tube every 12 hours  atorvastatin 20 milliGRAM(s) Oral at bedtime  chlorhexidine 4% Liquid 1 Application(s) Topical daily  cholecalciferol 1000 Unit(s) Oral daily  epoetin krystyna-epbx (RETACRIT) Injectable 07804 Unit(s) SubCutaneous <User Schedule>  escitalopram 15 milliGRAM(s) Oral with breakfast  gabapentin Solution 150 milliGRAM(s) Oral every 12 hours  influenza  Vaccine (HIGH DOSE) 0.5 milliLiter(s) IntraMuscular once  lacosamide Solution 200 milliGRAM(s) Oral two times a day  lamoTRIgine 50 milliGRAM(s) Oral two times a day  lanolin Ointment 1 Application(s) Topical daily  levETIRAcetam  Solution 750 milliGRAM(s) Oral two times a day  lurasidone 20 milliGRAM(s) Oral at bedtime  metoprolol tartrate 25 milliGRAM(s) Oral every 12 hours  nystatin Powder 1 Application(s) Topical every 8 hours  pantoprazole   Suspension 40 milliGRAM(s) Oral daily    MEDICATIONS  (PRN):  acetaminophen     Tablet .. 650 milliGRAM(s) Oral every 6 hours PRN Temp greater or equal to 38C (100.4F), Mild Pain (1 - 3)  aluminum hydroxide/magnesium hydroxide/simethicone Suspension 30 milliLiter(s) Oral every 4 hours PRN Dyspepsia  artificial tears (preservative free) Ophthalmic Solution 1 Drop(s) Both EYES every 6 hours PRN Dry Eyes  melatonin 3 milliGRAM(s) Oral at bedtime PRN Insomnia  ondansetron Injectable 4 milliGRAM(s) IV Push every 8 hours PRN Nausea and/or Vomiting    Vital Signs Last 24 Hrs  T(C): 36.4 (25 Mar 2025 04:39), Max: 37.2 (24 Mar 2025 11:15)  T(F): 97.6 (25 Mar 2025 04:39), Max: 98.9 (24 Mar 2025 11:15)  HR: 76 (25 Mar 2025 06:02) (73 - 97)  BP: 145/79 (25 Mar 2025 04:39) (143/89 - 156/91)  BP(mean): 113 (24 Mar 2025 13:30) (113 - 113)  RR: 18 (25 Mar 2025 04:39) (18 - 19)  SpO2: 97% (25 Mar 2025 06:02) (94% - 100%)  Parameters below as of 25 Mar 2025 06:02  Patient On (Oxygen Delivery Method): tracheostomy collar    PHYSICAL EXAM  Constitutional - NAD, Comfortable  HEENT - NCAT, EOMI  Neck - Supple  Chest - No distress.  Cardiovascular -Well perfused  Abdomen - BS+, Soft, NTND  Extremities - Trace Edema, No calf tenderness   Neurologic Exam -                 Alert  Cooperative  Motor 4/5 bl UE and LEs  Sensation intact  No clonus   Psychiatric - Mood stable, Affect WNL                          9.3    7.32  )-----------( 178      ( 25 Mar 2025 07:03 )             30.1     03-25    139  |  100  |  26[H]  ----------------------------<  120[H]  4.8   |  28  |  1.55[H]    Ca    9.4      25 Mar 2025 07:03  Phos  3.7     03-25  Mg     2.0     03-25      Impression:  70 yo with functional deficits secondary to diagnosis of debility    Plan:  PT- ROM, Bed Mob, Transfers, Amb w AD and bracing as needed  OT- ADLs, bracing  SLP- Dysphagia eval and treat  Prec- Falls, Cardiac, Pulm, Seizure  DVT Prophylaxis - On Apixaban  Skin- Turn q2 h  Dispo- Patient has been making good progress and tolerating therapies - will benefit from Acute Rehab- patient requires active and ongoing therapeutic interventions of multiple disciplines and can tolerate and benefit from 3 hours of intensive therapies x 2-4wks depending on progress at rehabilitation facility. Can actively participate and benefit from  an intensive rehabilitation program. Requires supervision by a rehabilitation physician and a coordinated interdisciplinary approach to providing rehabilitation.     Discussed with PT and     Total time taken to review relevant records and imaging results, examine patient, write note, and, when applicable, discuss case with patient, family, , resident, medical student and other medical providers:   [  ] 25 minutes (27912)  [X] 35 minutes (28001)  [  ] 50 minutes ([id number])

## 2025-03-25 NOTE — PROGRESS NOTE ADULT - PROBLEM SELECTOR PLAN 10
- Full Code  - Contact: Sister Ester [phone]  - PMR follow up on tomorrow to determine [facility]LE vs Acute Rehab upon discharge  - Patient and patients sister updated at bedside this afternoon

## 2025-03-25 NOTE — CHART NOTE - NSCHARTNOTEFT_GEN_A_CORE
NUTRITION FOLLOW UP NOTE    PATIENT SEEN FOR: follow up and consult for tube feeding    SOURCE: [] Patient  [x] Current Medical Record  [x] Nursing  [] Family/support person at bedside  [x] Patient unavailable/inappropriate  [] Other:    CHART REVIEWED/EVENTS NOTED.  [] No changes to nutrition care plan to note  [x] Nutrition Status:  -Acute on chronic respiratory failure with hypoxia; Primarily caused by Covid, superimposed by bacterial pneumonia per team. now s/p trach 3/12  -Heme/Onc following for cancer with mets   -PEG placed 2025  -SHAGUFTA, Nephrology following   -Failed FEES 3/18.     Diet, NPO with Tube Feed:   Tube Feeding Modality: Gastrostomy  Jevity 1.5 Garth (JEVITY1.5RTH)  Total Volume for 24 Hours (mL): 1440  Continuous  Starting Tube Feed Rate {mL per Hour}: 10  Increase Tube Feed Rate by (mL): 10     Every 4 hours  Until Goal Tube Feed Rate (mL per Hour): 60  Tube Feed Duration (in Hours): 24  Banatrol TF     Qty per Day:  1 (25 @ 13:41) [Active]    CURRENT DIET ORDER IS:  [x] Appropriate: see below for recommendations  [] Inadequate:  [] Other:    NUTRITION INTAKE/PROVISION:  [] PO:  [x] Enteral Nutrition: providing at 100% provision 2160kcal and 92g protein   [] Parenteral Nutrition:    ANTHROPOMETRICS:  Drug Dosing Weight  Height (cm): 167.6 (14 Mar 2025 11:04)  Weight (kg): 89.1 (14 Mar 2025 11:04)  BMI (kg/m2): 31.7 (14 Mar 2025 11:04)  BSA (m2): 1.98 (14 Mar 2025 11:04)  Weights:   Daily Weight in k () -- 1+ edema noted per flow sheets, Daily Weight in k.8 () - 2+ edema noted, Daily Weight in k.6 ()  Weight fluctuations present. ? accuracies. Recommend daily weights.      MEDICATIONS  (STANDING):  atorvastatin  cholecalciferol  metoprolol tartrate  pantoprazole   Suspension    Pertinent Labs:  @ 07:03: Na 139, BUN 26[H], Cr 1.55[H], [H], K+ 4.8, Phos 3.7, Mg 2.0, Alk Phos --, ALT/SGPT --, AST/SGOT --, HbA1c --    Finger Sticks:  POCT Blood Glucose.: 137 mg/dL ( @ 17:52)    NUTRITIONALLY PERTINENT MEDICATIONS/LABS:  [x] Reviewed  [x] Relevant notes on medications/labs:  -sodium, potassium, magnesium and phosphorus within normal limits     EDEMA:  [x] Reviewed  [] Relevant notes:    GI/ I&O:  [x] Reviewed  [x] Relevant notes: lose stool per team  [] Other:    SKIN:   per flow sheets:  right heel stage I  left side of head stage II    ESTIMATED NEEDS:  Based on: IBW + 10% 71kg  27-32kcal/kg 1917-2272kcal/day  1.3-1.6g/kg 92-114g protein/day  defer fluid needs to team    NUTRITION DIAGNOSIS:  [x] Prior Dx: Severe Acute Malnutrition, increased nutrient needs  [] New Dx:     EDUCATION:  [] Yes:  [x] Not appropriate/warranted    NUTRITION CARE PLAN:  1. Diet: Due to persistent lose stool per team, can consider changing EN regimen to Vital 1.5. Goal is 60ml/hr x 24 hours to provide a total volume of 1440ml, 2160kcal (30kcal/kg) and 98g protein (1.4g/kg).   -->Defer free water flush to team  -->Should pt tolerate regimen, consider bolus   2. Supplements: continue Banatrol to aid in stool bulking   3. Multivitamin/mineral supplementation: continue as ordered to aid in wound healing. also consider addition of Multivitamin to aid in wound healing if no contraindications     [] Achieved - Continue current nutrition intervention(s)  [] Current medical condition precludes nutrition intervention at this time.    MONITORING AND EVALUATION:   RD remains available upon request and will follow up per protocol.    Ansley Rodriguez, MS, RD, CDN / Teams

## 2025-03-25 NOTE — PROGRESS NOTE ADULT - ASSESSMENT
Mr. Gr is a 69 year old male with PMHx of seizure disorder, sleep apnea, HTN, chronic idiopathic constipation, stage III CKD, severe obesity, secondary hyperparathyroidism, anemia, thrombocytopenia, hyperlipidemia, testicular cancer under treatment with Dr. Ovalles who is admitted for acute hypoxic respiratory failure secondary to COVID vs superimposed pneumonia with new onset thrombocytopenia. He was recently discharged 02/06/2025 after a tenous stay including intubation in the ICU for respiratory failure in setting of seizure. He had recovered and was discharged to rehab.      Former smoker, quit 14y prior, denied ETOH, drug use. Lives at home. Does not have children. Denies family history of blood disorders or malignancy.  Denies previous workplace related exposures.  Denies previous history of blood transfusions.  Denied history of thromboses.  Denied history of  requiring anticoagulation.     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  Margins were negative.  pT3Nx. CT C/A/P with few left para-aortic and left iliac chain lymph nodes largest measuring 8.1 cm left para-aortic node, bilateral subcentimeter noncalcified pulmonary nodules measuring up to 7 mm. AFP, LDH, hCG were all elevated. 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 Started on adjuvant therapy with Cisplatin+Etoposide, so far completed 4 cycles of treatment with cisplatin dose reduced 50% and Etoposide 50% due to thrombocytopenia.      02/19/2025: on HFNC, noted tachycardia and fever, Klebsiella in urine as well, thrombocytopenia stable/improving, no signs of active bleeding     02/20/2025: developed A.fib with RVR and was placed on heparin drip and pending cardiology eval    02/21/2025: awake, on AVAPS overnight, noted RRT for hypoxia as pt removed HFNC at some point in time, good response thereafter     02/22/2025:  continues on AVAPS this morning, noted RRT overnight for hypoxia, hypercapnia, ICU following, US LE negative for DVT, counts overall stable/improved     02/23/2025: progressive respiratory failure, noted ongoing RTT this morning with pending intubation and transfer to MICU     02/24/2025: intubated 02/23/2025, sedated, on pressor support, no signs of bleeding, counts noted, no signs of bleeding     02/25/2025: continues in MICU, intubated and sedated, no signs of bleeding    02/26/2025: continues in MICU, intubated, without signs of bleeding, continues on heparin drip     02/27/2025:  remains under the care of the ICU, intubated, no signs of bleeding and continues on heparin drip, counts stable, has not required PRBC support this admission    02/28/2025: continues under the care of MICU, continues intubated, no signs of bleeding, on heparin drip    03/01/2025: continues in MICU, intubated, direct josefina IgG positive, eluate negative, not likely to be clinically significant     03/02/2025:  continues in MICU, nursing staff at bedside, no overnight events noted. CTH without acute intracranial pathology     03/03/2025: continues in MICU, intubated, on heparin drip, 1u PRBC overnight, no signs of bleeding, platelet count stable     03/04/2025: awake, moving arms spontaneously on exam, continues without much interaction however. No signs of bleeding, continues heparin drip, continues intubated in MICU     03/05/2025: extubated 03/04/2025, continues in MICU, awake and alert this morning and able to speak full sentences, discussed/reviewed findings, continues on heparin drip     03/06/2025: nursing staff at bedside, bipap overnight, without signs of bleeding, counts noted stable, renal function improving     03/07/2025:  no signs of bleeding, counts noted, continues heparin drip, continues in MICU    03/08/2025: on HFNC, no signs of bleeding, although alert and answering questions, not back to his baseline yet, continues in MICU    03/09/2025: continues in MICU, s/p re-intubation 03/08/2025 given respiratory compromise in the setting of copious secretions as evidenced by bronchoscopy, in setting of fever, now sedated, mild crusting of blood around mouth, without active sign of bleeding, counts noted reviewed, continues on heparin drip     03/10/2025: continues in MICU, no signs of bleeding, discussed with nursing staff at bedside, receiving 1u PRBC due to H/H downtrend, sputum culture with Pseudomonas, ICU and ID recs noted, continues to be intubated     03/11/2025: continues in MICU, intubated and sedated, noted counts, without signs of bleeding, appropriate response to transfusion    03/12/2025: continues in MICU, intubated and sedated, without signs of bleeding     03/13/2025: continues in MICU, s/p Tracheostomy 03/12/2025 with tracheal intubation, continues with sedation     03/14/2025:  awake and alert, mental status much improved, transferred from MICU to [address] [place] [address] 03/13/2025. Without significant events overnight, discussed with nursing staff at bedside, stated pt did well overnight, no signs of bleeding, no fever noted, medications provided via NGT and he is pending PEG-Tube placement today. His case is complex, noted with repeat need for intubations, discussed with pt, he is aware. Discussed with pts primary Oncologist as well.    03/15/2025: awake and alert, no overnight events noted, discussed with nursing staff, he did well overnight, no fevers, no signs of bleeding endorsed. He nods to information. Counts reviewed overall stable, discussed with him as well. Had PEG placed 03/14/2025 03/16/2025: no overnight events noted, comfortable overnight, nursing staff at bedside, stated pt did well, no signs of bleeding, had a BM. No fever reported. Counts reviewed, stable at this time     03/17/2025: no overnight events noted however continues with need for suctioning due to increase secretions, discussed with overnight staff, mild bleeding likely due to trauma from suctioning and recent tracheostomy placement without evidence of active bleeding otherwise. No fevers noted overnight. Had multiple smaller BMs, without evidence of bleeding, Nephrology recs and reviewed noted for Epo    03/18/2025: without significant overnight events, discussed with team at bedside, no evidence of bleeding, noted cardiology recs to start Eliquis 5mg BID for A.fib, currently on Lovenox, previously did require PRBC this admission but has since been stable, renal function seems to be improving, and Plt count improved, ok for AC per cardiology recs with Eliquis, monitor H/H closely     03/19/2025: comfortable, no overnight events noted, discussed with team, had 1 BM overnight without evidence of bleeding, now on Eliquis 5mg BID per cardiology recs for A.fib, renal function stable. Oxygen requirements stable overnight. No fevers reported. Pts primary Oncologist, Dr. Ovalles, aware, case discussed     03/20/2025: without significant events noted, continues on Eliquis, therapeutic, without signs of bleeding, continues on Epo, counts and imaging reviewed, no overnight events noted, continues with tracheostomy without increased oxygen requirements, increased secretions noted     03/21/2025: without significant overnight events noted. Discussed with overnight team, without evidence of bleeding, fever. Had a BM overnight, normal. Without increasing oxygen requirements, Without seizure like activity given hx of malignancy and cerebellar finding previously. Continues on Eliquis. Counts noted and reviewed, anemia, labile thrombocytopenia     03/22/2025:  no overnight events noted, discussed with overnight staff, pt did well, without increased oxygen requirements, without signs of bleeding. Had one BM overnight, without bleeding. Continues on Eliquis, Renal function is stable and appropriate. Counts and labs reviewed, overall stable however anemia persists.     03/23/2025: no significant overnight events noted, discussed with overnight team, without signs of bleeding. Oxygen requirements stable, pulmonary recs noted. Labs and course reviewed     03/24/2025:  comfortable, without significant overnight events noted, continues with Trach, without increased oxygen requirements, discussed with overnight team, had 1 BM overnight. No signs of bleeding noted, Counts and labs reviewed overall improving/stable. On exam without signs of bleeding, continues on Eliquis 5mg BID       #Acute hypoxic respiratory failure, Acute, Severe   # COVID  - CT noted with bilateral GGO  - ID following  - Pulmonary following  - Antibiotics per primary team/MICU and ID   - Intubated 02/23/2025 AM, MICU   - extubated 03/04/2025  - Pseudomonas from sputum culture   - Re-intubated 03/09/2025 due to increased work of breathing in setting of increased secretions, not protecting airway, tachypnea, hypoxia   - s/p Tracheostomy 03/12/2025    # Thrombocytopenia, Acute, Moderate   - Did occur during most recent admission and improved to normal prior to discharge   - Without signs of bleeding  - Could be multifactorial, possibly due to infection   - Continue to trend  - Transfuse to maintain Plt > 10k unless actively bleeding then maintain > 50k     # Brain lesion Hx of cancer, Acute, Severe   - pt with hx of seizures  - MRI brain now with 5.4 mm enhancing lesion in the LEFT middle cerebellar peduncle with associated edema suspicious for metastases  - MRI Lumbar negative for acute disease  - Small lesion without mass effect or edema, recommended to correlate per neurology if foci and locus are concordant with seizure activity  - Neurology recs noted, new lesion not likely to cause seizure  - It is rare, but nonetheless not impossible, for testicular cancer to be metastatic to the brain  - He will need another PET-CT, can be completed outpatient once stable if concern can proceed with biopsy at that time   - Previous endocrinology eval for thyroid nodule noted  - AFP/BHCH normal,  previously   - Repeat CTH without acute intracranial pathology     # 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 Ovalles, agree with current plan  - Follow up as outpatient with Franki Ovalles MD     # Acute kidney injury on CKD Stage IIIA, Acute, Moderate   - Likely multifactorial  - Nephrology consult and recs noted  - Now on therapeutic Eliquis, will need to be monitoring in high risk setting for bleeding   - Continue to trend     # Normocytic anemia, Acute, Severe   - Chronic, has hx of PRBC during chemo 07/2024  - Noted Anti E, Anti-K Anti-C antibodies previously  - direct josefina IgG positive, eluate negative, not likely to be clinically significant   - s/p 2u PRBC 03/03/2025 and 03/10/2025   - Monitor for bleeding  - Epo ordered  - Recommend to transfuse to maintain Hb > 7    # Klebsiella UTI, Acute, Severe > Moderate   # Pseudomonas UTI, Acute, Moderate   -Antibiotics per ID and primary team       Recommendations  - Trend CBC daily  - Transfuse to maintain Hb > 7   - Transfuse to maintain Plt >10k unless active bleeding then >50k   - Cardiology recs noted, now on Eliquis 5mg BID for A.fib, monitor H/H         Thank you for allowing me to participate in the care of Mr. Gr please do not hesitate to call or text me if you have further questions or concerns.     Brayan Mart MD  [facility] [place]   Division of Hematology/Oncology  [facility], [address]  [place] [address]  P: [phone]  F: [phone]    Attestation:    ----Pt evaluated, >50min spent including face-to-face interaction in addition to chart review, reviewing treatment plan, discussing with care staff in hospital, his outside physician, and managing, reviewing orders, labs and additional time spent documenting, in addition to the patient’s chronic diagnoses as listed in the assessment----        Mr. Gr is a 69 year old male with PMHx of seizure disorder, sleep apnea, HTN, chronic idiopathic constipation, stage III CKD, severe obesity, secondary hyperparathyroidism, anemia, thrombocytopenia, hyperlipidemia, testicular cancer under treatment with Dr. Ovalles who is admitted for acute hypoxic respiratory failure secondary to COVID vs superimposed pneumonia with new onset thrombocytopenia. He was recently discharged 02/06/2025 after a tenous stay including intubation in the ICU for respiratory failure in setting of seizure. He had recovered and was discharged to rehab.      Former smoker, quit 14y prior, denied ETOH, drug use. Lives at home. Does not have children. Denies family history of blood disorders or malignancy.  Denies previous workplace related exposures.  Denies previous history of blood transfusions.  Denied history of thromboses.  Denied history of  requiring anticoagulation.     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  Margins were negative.  pT3Nx. CT C/A/P with few left para-aortic and left iliac chain lymph nodes largest measuring 8.1 cm left para-aortic node, bilateral subcentimeter noncalcified pulmonary nodules measuring up to 7 mm. AFP, LDH, hCG were all elevated. 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 Started on adjuvant therapy with Cisplatin+Etoposide, so far completed 4 cycles of treatment with cisplatin dose reduced 50% and Etoposide 50% due to thrombocytopenia.      02/19/2025: on HFNC, noted tachycardia and fever, Klebsiella in urine as well, thrombocytopenia stable/improving, no signs of active bleeding     02/20/2025: developed A.fib with RVR and was placed on heparin drip and pending cardiology eval    02/21/2025: awake, on AVAPS overnight, noted RRT for hypoxia as pt removed HFNC at some point in time, good response thereafter     02/22/2025:  continues on AVAPS this morning, noted RRT overnight for hypoxia, hypercapnia, ICU following, US LE negative for DVT, counts overall stable/improved     02/23/2025: progressive respiratory failure, noted ongoing RTT this morning with pending intubation and transfer to MICU     02/24/2025: intubated 02/23/2025, sedated, on pressor support, no signs of bleeding, counts noted, no signs of bleeding     02/25/2025: continues in MICU, intubated and sedated, no signs of bleeding    02/26/2025: continues in MICU, intubated, without signs of bleeding, continues on heparin drip     02/27/2025:  remains under the care of the ICU, intubated, no signs of bleeding and continues on heparin drip, counts stable, has not required PRBC support this admission    02/28/2025: continues under the care of MICU, continues intubated, no signs of bleeding, on heparin drip    03/01/2025: continues in MICU, intubated, direct josefina IgG positive, eluate negative, not likely to be clinically significant     03/02/2025:  continues in MICU, nursing staff at bedside, no overnight events noted. CTH without acute intracranial pathology     03/03/2025: continues in MICU, intubated, on heparin drip, 1u PRBC overnight, no signs of bleeding, platelet count stable     03/04/2025: awake, moving arms spontaneously on exam, continues without much interaction however. No signs of bleeding, continues heparin drip, continues intubated in MICU     03/05/2025: extubated 03/04/2025, continues in MICU, awake and alert this morning and able to speak full sentences, discussed/reviewed findings, continues on heparin drip     03/06/2025: nursing staff at bedside, bipap overnight, without signs of bleeding, counts noted stable, renal function improving     03/07/2025:  no signs of bleeding, counts noted, continues heparin drip, continues in MICU    03/08/2025: on HFNC, no signs of bleeding, although alert and answering questions, not back to his baseline yet, continues in MICU    03/09/2025: continues in MICU, s/p re-intubation 03/08/2025 given respiratory compromise in the setting of copious secretions as evidenced by bronchoscopy, in setting of fever, now sedated, mild crusting of blood around mouth, without active sign of bleeding, counts noted reviewed, continues on heparin drip     03/10/2025: continues in MICU, no signs of bleeding, discussed with nursing staff at bedside, receiving 1u PRBC due to H/H downtrend, sputum culture with Pseudomonas, ICU and ID recs noted, continues to be intubated     03/11/2025: continues in MICU, intubated and sedated, noted counts, without signs of bleeding, appropriate response to transfusion    03/12/2025: continues in MICU, intubated and sedated, without signs of bleeding     03/13/2025: continues in MICU, s/p Tracheostomy 03/12/2025 with tracheal intubation, continues with sedation     03/14/2025:  awake and alert, mental status much improved, transferred from MICU to [address] [place] [address] 03/13/2025. Without significant events overnight, discussed with nursing staff at bedside, stated pt did well overnight, no signs of bleeding, no fever noted, medications provided via NGT and he is pending PEG-Tube placement today. His case is complex, noted with repeat need for intubations, discussed with pt, he is aware. Discussed with pts primary Oncologist as well.    03/15/2025: awake and alert, no overnight events noted, discussed with nursing staff, he did well overnight, no fevers, no signs of bleeding endorsed. He nods to information. Counts reviewed overall stable, discussed with him as well. Had PEG placed 03/14/2025 03/16/2025: no overnight events noted, comfortable overnight, nursing staff at bedside, stated pt did well, no signs of bleeding, had a BM. No fever reported. Counts reviewed, stable at this time     03/17/2025: no overnight events noted however continues with need for suctioning due to increase secretions, discussed with overnight staff, mild bleeding likely due to trauma from suctioning and recent tracheostomy placement without evidence of active bleeding otherwise. No fevers noted overnight. Had multiple smaller BMs, without evidence of bleeding, Nephrology recs and reviewed noted for Epo    03/18/2025: without significant overnight events, discussed with team at bedside, no evidence of bleeding, noted cardiology recs to start Eliquis 5mg BID for A.fib, currently on Lovenox, previously did require PRBC this admission but has since been stable, renal function seems to be improving, and Plt count improved, ok for AC per cardiology recs with Eliquis, monitor H/H closely     03/19/2025: comfortable, no overnight events noted, discussed with team, had 1 BM overnight without evidence of bleeding, now on Eliquis 5mg BID per cardiology recs for A.fib, renal function stable. Oxygen requirements stable overnight. No fevers reported. Pts primary Oncologist, Dr. Ovalles, aware, case discussed     03/20/2025: without significant events noted, continues on Eliquis, therapeutic, without signs of bleeding, continues on Epo, counts and imaging reviewed, no overnight events noted, continues with tracheostomy without increased oxygen requirements, increased secretions noted     03/21/2025: without significant overnight events noted. Discussed with overnight team, without evidence of bleeding, fever. Had a BM overnight, normal. Without increasing oxygen requirements, Without seizure like activity given hx of malignancy and cerebellar finding previously. Continues on Eliquis. Counts noted and reviewed, anemia, labile thrombocytopenia     03/22/2025:  no overnight events noted, discussed with overnight staff, pt did well, without increased oxygen requirements, without signs of bleeding. Had one BM overnight, without bleeding. Continues on Eliquis, Renal function is stable and appropriate. Counts and labs reviewed, overall stable however anemia persists.     03/23/2025: no significant overnight events noted, discussed with overnight team, without signs of bleeding. Oxygen requirements stable, pulmonary recs noted. Labs and course reviewed     03/24/2025:  comfortable, without significant overnight events noted, continues with Trach, without increased oxygen requirements, discussed with overnight team, had 1 BM overnight. No signs of bleeding noted, Counts and labs reviewed overall improving/stable. On exam without signs of bleeding, continues on Eliquis 5mg BID     03/25/2025: , comfortable this morning, without significant overnight events noted, without signs of bleeding, continues with tracheostomy, stable oxygen requirements. Counts, labs reviewed. Continues on Eliquis 5mg BID and TROY       #Acute hypoxic respiratory failure, Acute, Severe   # COVID  - CT noted with bilateral GGO  - ID following  - Pulmonary following  - Antibiotics per primary team/MICU and ID   - Intubated 02/23/2025 AM, MICU   - extubated 03/04/2025  - Pseudomonas from sputum culture   - Re-intubated 03/09/2025 due to increased work of breathing in setting of increased secretions, not protecting airway, tachypnea, hypoxia   - s/p Tracheostomy 03/12/2025    # Thrombocytopenia, Acute, Moderate   - Did occur during most recent admission and improved to normal prior to discharge   - Without signs of bleeding  - Could be multifactorial, possibly due to infection   - Continue to trend  - Transfuse to maintain Plt > 10k unless actively bleeding then maintain > 50k     # Brain lesion Hx of cancer, Acute, Severe   - pt with hx of seizures  - MRI brain now with 5.4 mm enhancing lesion in the LEFT middle cerebellar peduncle with associated edema suspicious for metastases  - MRI Lumbar negative for acute disease  - Small lesion without mass effect or edema, recommended to correlate per neurology if foci and locus are concordant with seizure activity  - Neurology recs noted, new lesion not likely to cause seizure  - It is rare, but nonetheless not impossible, for testicular cancer to be metastatic to the brain  - He will need another PET-CT, can be completed outpatient once stable if concern can proceed with biopsy at that time   - Previous endocrinology eval for thyroid nodule noted  - AFP/BHCH normal,  previously   - Repeat CTH without acute intracranial pathology     # 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  - Noted Anti E, Anti-K Anti-C antibodies previously  - direct josefina IgG positive, eluate negative, not likely to be clinically significant   - s/p 2u PRBC 03/03/2025 and 03/10/2025   - Monitor for bleeding  - Epo ordered  - Recommend to transfuse to maintain Hb > 7    # Klebsiella UTI, Acute, Severe > Moderate   # Pseudomonas UTI, Acute, Moderate   -Antibiotics per ID and primary team       Recommendations  - Trend CBC daily  - Transfuse to maintain Hb > 7   - Transfuse to maintain Plt >10k unless active bleeding then >50k   - Cardiology recs noted, now on Eliquis 5mg BID for A.fib, monitor H/H         Thank you for allowing me to participate in the care of Mr. Gr please do not hesitate to call or text me if you have further questions or concerns.     Brayan Mart MD  [facility] [place]   Division of Hematology/Oncology  [address], [address]  [place] [address]  P: [phone]  F: [phone]    Attestation:    ----Pt evaluated, >50min spent including face-to-face interaction in addition to chart review, reviewing treatment plan, discussing with care staff in hospital, his outside physician, and managing, reviewing orders, labs and additional time spent documenting, in addition to the patient’s chronic diagnoses as listed in the assessment----

## 2025-03-25 NOTE — PROGRESS NOTE ADULT - SUBJECTIVE AND OBJECTIVE BOX
[facility]EMATOLOGY/ON[facility]Y INPATIENT PROGRESS NOTE     Interval Hx:   03-25-25: Mr. Gr was seen at bedside today.    Meds:   MEDICATIONS  (STANDING):  acetaminophen   IVPB .. 1000 milliGRAM(s) IV Intermittent once  albuterol/ipratropium for Nebulization 3 milliLiter(s) Nebulizer every 6 hours  apixaban 5 milliGRAM(s) Enteral Tube every 12 hours  atorvastatin 20 milliGRAM(s) Oral at bedtime  chlorhexidine 4% Liquid 1 Application(s) Topical daily  cholecalciferol 1000 Unit(s) Oral daily  epoetin krystyna-epbx (RETACRIT) Injectable 45109 Unit(s) SubCutaneous <User Schedule>  escitalopram 15 milliGRAM(s) Oral with breakfast  gabapentin Solution 150 milliGRAM(s) Oral every 12 hours  influenza  Vaccine (HIGH DOSE) 0.5 milliLiter(s) IntraMuscular once  lacosamide Solution 200 milliGRAM(s) Oral two times a day  lamoTRIgine 50 milliGRAM(s) Oral two times a day  lanolin Ointment 1 Application(s) Topical daily  levETIRAcetam  Solution 750 milliGRAM(s) Oral two times a day  lurasidone 20 milliGRAM(s) Oral at bedtime  metoprolol tartrate 25 milliGRAM(s) Oral every 12 hours  nystatin Powder 1 Application(s) Topical every 8 hours  pantoprazole   Suspension 40 milliGRAM(s) Oral daily    MEDICATIONS  (PRN):  acetaminophen     Tablet .. 650 milliGRAM(s) Oral every 6 hours PRN Temp greater or equal to 38C (100.4F), Mild Pain (1 - 3)  aluminum hydroxide/magnesium hydroxide/simethicone Suspension 30 milliLiter(s) Oral every 4 hours PRN Dyspepsia  artificial tears (preservative free) Ophthalmic Solution 1 Drop(s) Both EYES every 6 hours PRN Dry Eyes  melatonin 3 milliGRAM(s) Oral at bedtime PRN Insomnia  ondansetron Injectable 4 milliGRAM(s) IV Push every 8 hours PRN Nausea and/or Vomiting    Vital Signs Last 24 Hrs  T(C): 36.4 (25 Mar 2025 04:39), Max: 37.2 (24 Mar 2025 11:15)  T(F): 97.6 (25 Mar 2025 04:39), Max: 98.9 (24 Mar 2025 11:15)  HR: 90 (25 Mar 2025 04:39) (73 - 97)  BP: 145/79 (25 Mar 2025 04:39) (136/86 - 156/91)  BP(mean): 113 (24 Mar 2025 13:30) (113 - 113)  RR: 18 (25 Mar 2025 04:39) (16 - 19)  SpO2: 94% (25 Mar 2025 04:39) (94% - 100%)    Parameters below as of 25 Mar 2025 04:39  Patient On (Oxygen Delivery Method): tracheostomy collar    Physical Exam:  Gen: NAD, tracheostomy with ETT  HEENT: EOMI, MMM  Chest: equal chest rise  Cardiac: regular   Abd: non distended    Labs:                        9.4    7.55  )-----------( 203      ( 24 Mar 2025 06:08 )             30.2     CBC Full  -  ( 24 Mar 2025 06:08 )  WBC Count : 7.55 K/uL  RBC Count : 2.98 M/uL  Hemoglobin : 9.4 g/dL  Hematocrit : 30.2 %  Platelet Count - Automated : 203 K/uL  Mean Cell Volume : 101.3 fl  Mean Cell Hemoglobin : 31.5 pg  Mean Cell Hemoglobin Concentration : 31.1 g/dL    03-24    139  |  100  |  23  ----------------------------<  106[H]  4.9   |  27  |  1.44[H]    Ca    9.3      24 Mar 2025 06:08  Phos  3.4     03-24  Mg     1.9     03-24         [facility]AT[facility]/ON[facility] INPATIENT PROGRESS NOTE     Interval Hx:   03-25-25: Mr. Gr was seen at bedside today, comfortable this morning, without significant overnight events noted, without signs of bleeding, continues with tracheostomy, stable oxygen requirements. Counts, labs reviewed. Continues on Eliquis 5mg BID and TROY     Meds:   MEDICATIONS  (STANDING):  acetaminophen   IVPB .. 1000 milliGRAM(s) IV Intermittent once  albuterol/ipratropium for Nebulization 3 milliLiter(s) Nebulizer every 6 hours  apixaban 5 milliGRAM(s) Enteral Tube every 12 hours  atorvastatin 20 milliGRAM(s) Oral at bedtime  chlorhexidine 4% Liquid 1 Application(s) Topical daily  cholecalciferol 1000 Unit(s) Oral daily  epoetin krystyna-epbx (RETACRIT) Injectable 85569 Unit(s) SubCutaneous <User Schedule>  escitalopram 15 milliGRAM(s) Oral with breakfast  gabapentin Solution 150 milliGRAM(s) Oral every 12 hours  influenza  Vaccine (HIGH DOSE) 0.5 milliLiter(s) IntraMuscular once  lacosamide Solution 200 milliGRAM(s) Oral two times a day  lamoTRIgine 50 milliGRAM(s) Oral two times a day  lanolin Ointment 1 Application(s) Topical daily  levETIRAcetam  Solution 750 milliGRAM(s) Oral two times a day  lurasidone 20 milliGRAM(s) Oral at bedtime  metoprolol tartrate 25 milliGRAM(s) Oral every 12 hours  nystatin Powder 1 Application(s) Topical every 8 hours  pantoprazole   Suspension 40 milliGRAM(s) Oral daily    MEDICATIONS  (PRN):  acetaminophen     Tablet .. 650 milliGRAM(s) Oral every 6 hours PRN Temp greater or equal to 38C (100.4F), Mild Pain (1 - 3)  aluminum hydroxide/magnesium hydroxide/simethicone Suspension 30 milliLiter(s) Oral every 4 hours PRN Dyspepsia  artificial tears (preservative free) Ophthalmic Solution 1 Drop(s) Both EYES every 6 hours PRN Dry Eyes  melatonin 3 milliGRAM(s) Oral at bedtime PRN Insomnia  ondansetron Injectable 4 milliGRAM(s) IV Push every 8 hours PRN Nausea and/or Vomiting    Vital Signs Last 24 Hrs  T(C): 36.4 (25 Mar 2025 04:39), Max: 37.2 (24 Mar 2025 11:15)  T(F): 97.6 (25 Mar 2025 04:39), Max: 98.9 (24 Mar 2025 11:15)  HR: 90 (25 Mar 2025 04:39) (73 - 97)  BP: 145/79 (25 Mar 2025 04:39) (136/86 - 156/91)  BP(mean): 113 (24 Mar 2025 13:30) (113 - 113)  RR: 18 (25 Mar 2025 04:39) (16 - 19)  SpO2: 94% (25 Mar 2025 04:39) (94% - 100%)    Parameters below as of 25 Mar 2025 04:39  Patient On (Oxygen Delivery Method): tracheostomy collar    Physical Exam:  Gen: NAD, tracheostomy with ETT  HEENT: EOMI, MMM  Chest: equal chest rise  Cardiac: regular   Abd: non distended    Labs:                        9.4    7.55  )-----------( 203      ( 24 Mar 2025 06:08 )             30.2     CBC Full  -  ( 24 Mar 2025 06:08 )  WBC Count : 7.55 K/uL  RBC Count : 2.98 M/uL  Hemoglobin : 9.4 g/dL  Hematocrit : 30.2 %  Platelet Count - Automated : 203 K/uL  Mean Cell Volume : 101.3 fl  Mean Cell Hemoglobin : 31.5 pg  Mean Cell Hemoglobin Concentration : 31.1 g/dL    03-24    139  |  100  |  23  ----------------------------<  106[H]  4.9   |  27  |  1.44[H]    Ca    9.3      24 Mar 2025 06:08  Phos  3.4     03-24  Mg     1.9     03-24

## 2025-03-25 NOTE — PROGRESS NOTE ADULT - PROBLEM SELECTOR PLAN 8
- SHAGUFTA on CKD stage 3 suspected in setting of new infection Covid 19   - CR peaked to 4.34 on 3/3, now improving   - Renvela d/cd Hyperphosphatemia resolved   - Continue Retacrit 38170 U tiw sq

## 2025-03-26 LAB
ANION GAP SERPL CALC-SCNC: 11 MMOL/L — SIGNIFICANT CHANGE UP (ref 5–17)
BUN SERPL-MCNC: 29 MG/DL — HIGH (ref 7–23)
CALCIUM SERPL-MCNC: 9.4 MG/DL — SIGNIFICANT CHANGE UP (ref 8.4–10.5)
CHLORIDE SERPL-SCNC: 100 MMOL/L — SIGNIFICANT CHANGE UP (ref 96–108)
CO2 SERPL-SCNC: 26 MMOL/L — SIGNIFICANT CHANGE UP (ref 22–31)
CREAT SERPL-MCNC: 1.59 MG/DL — HIGH (ref 0.5–1.3)
EGFR: 47 ML/MIN/1.73M2 — LOW
EGFR: 47 ML/MIN/1.73M2 — LOW
GLUCOSE SERPL-MCNC: 97 MG/DL — SIGNIFICANT CHANGE UP (ref 70–99)
HCT VFR BLD CALC: 30.3 % — LOW (ref 39–50)
HGB BLD-MCNC: 9.4 G/DL — LOW (ref 13–17)
MAGNESIUM SERPL-MCNC: 2.1 MG/DL — SIGNIFICANT CHANGE UP (ref 1.6–2.6)
MCHC RBC-ENTMCNC: 31 G/DL — LOW (ref 32–36)
MCHC RBC-ENTMCNC: 31.5 PG — SIGNIFICANT CHANGE UP (ref 27–34)
MCV RBC AUTO: 101.7 FL — HIGH (ref 80–100)
NRBC BLD AUTO-RTO: 0 /100 WBCS — SIGNIFICANT CHANGE UP (ref 0–0)
PHOSPHATE SERPL-MCNC: 3.4 MG/DL — SIGNIFICANT CHANGE UP (ref 2.5–4.5)
PLATELET # BLD AUTO: 196 K/UL — SIGNIFICANT CHANGE UP (ref 150–400)
POTASSIUM SERPL-MCNC: 5.1 MMOL/L — SIGNIFICANT CHANGE UP (ref 3.5–5.3)
POTASSIUM SERPL-SCNC: 5.1 MMOL/L — SIGNIFICANT CHANGE UP (ref 3.5–5.3)
RBC # BLD: 2.98 M/UL — LOW (ref 4.2–5.8)
RBC # FLD: 18.2 % — HIGH (ref 10.3–14.5)
SODIUM SERPL-SCNC: 137 MMOL/L — SIGNIFICANT CHANGE UP (ref 135–145)
WBC # BLD: 8.54 K/UL — SIGNIFICANT CHANGE UP (ref 3.8–10.5)
WBC # FLD AUTO: 8.54 K/UL — SIGNIFICANT CHANGE UP (ref 3.8–10.5)

## 2025-03-26 PROCEDURE — 99233 SBSQ HOSP IP/OBS HIGH 50: CPT | Mod: FS

## 2025-03-26 PROCEDURE — 74230 X-RAY XM SWLNG FUNCJ C+: CPT | Mod: 26

## 2025-03-26 RX ORDER — SODIUM CHLORIDE 9 G/1000ML
1000 INJECTION, SOLUTION INTRAVENOUS
Refills: 0 | Status: COMPLETED | OUTPATIENT
Start: 2025-03-26 | End: 2025-03-26

## 2025-03-26 RX ADMIN — LURASIDONE HYDROCHLORIDE 20 MILLIGRAM(S): 120 TABLET, FILM COATED ORAL at 21:58

## 2025-03-26 RX ADMIN — Medication 1 APPLICATION(S): at 12:00

## 2025-03-26 RX ADMIN — NYSTATIN 1 APPLICATION(S): 100000 CREAM TOPICAL at 05:11

## 2025-03-26 RX ADMIN — LAMOTRIGINE 50 MILLIGRAM(S): 150 TABLET ORAL at 18:43

## 2025-03-26 RX ADMIN — Medication 40 MILLIGRAM(S): at 11:25

## 2025-03-26 RX ADMIN — LACOSAMIDE 200 MILLIGRAM(S): 150 TABLET, FILM COATED ORAL at 18:42

## 2025-03-26 RX ADMIN — SODIUM CHLORIDE 75 MILLILITER(S): 9 INJECTION, SOLUTION INTRAVENOUS at 11:25

## 2025-03-26 RX ADMIN — METOPROLOL SUCCINATE 25 MILLIGRAM(S): 50 TABLET, EXTENDED RELEASE ORAL at 18:43

## 2025-03-26 RX ADMIN — LAMOTRIGINE 50 MILLIGRAM(S): 150 TABLET ORAL at 05:10

## 2025-03-26 RX ADMIN — APIXABAN 5 MILLIGRAM(S): 2.5 TABLET, FILM COATED ORAL at 18:43

## 2025-03-26 RX ADMIN — GABAPENTIN 150 MILLIGRAM(S): 400 CAPSULE ORAL at 05:09

## 2025-03-26 RX ADMIN — ATORVASTATIN CALCIUM 20 MILLIGRAM(S): 80 TABLET, FILM COATED ORAL at 21:58

## 2025-03-26 RX ADMIN — IPRATROPIUM BROMIDE AND ALBUTEROL SULFATE 3 MILLILITER(S): .5; 2.5 SOLUTION RESPIRATORY (INHALATION) at 23:31

## 2025-03-26 RX ADMIN — NYSTATIN 1 APPLICATION(S): 100000 CREAM TOPICAL at 21:59

## 2025-03-26 RX ADMIN — GABAPENTIN 150 MILLIGRAM(S): 400 CAPSULE ORAL at 18:42

## 2025-03-26 RX ADMIN — IPRATROPIUM BROMIDE AND ALBUTEROL SULFATE 3 MILLILITER(S): .5; 2.5 SOLUTION RESPIRATORY (INHALATION) at 18:17

## 2025-03-26 RX ADMIN — IPRATROPIUM BROMIDE AND ALBUTEROL SULFATE 3 MILLILITER(S): .5; 2.5 SOLUTION RESPIRATORY (INHALATION) at 11:24

## 2025-03-26 RX ADMIN — LACOSAMIDE 200 MILLIGRAM(S): 150 TABLET, FILM COATED ORAL at 05:09

## 2025-03-26 RX ADMIN — METOPROLOL SUCCINATE 25 MILLIGRAM(S): 50 TABLET, EXTENDED RELEASE ORAL at 05:09

## 2025-03-26 RX ADMIN — LEVETIRACETAM 750 MILLIGRAM(S): 10 INJECTION, SOLUTION INTRAVENOUS at 05:09

## 2025-03-26 RX ADMIN — Medication 1 APPLICATION(S): at 11:26

## 2025-03-26 RX ADMIN — NYSTATIN 1 APPLICATION(S): 100000 CREAM TOPICAL at 14:29

## 2025-03-26 RX ADMIN — APIXABAN 5 MILLIGRAM(S): 2.5 TABLET, FILM COATED ORAL at 05:10

## 2025-03-26 RX ADMIN — Medication 1000 UNIT(S): at 11:25

## 2025-03-26 RX ADMIN — LEVETIRACETAM 750 MILLIGRAM(S): 10 INJECTION, SOLUTION INTRAVENOUS at 18:42

## 2025-03-26 RX ADMIN — ESCITALOPRAM OXALATE 15 MILLIGRAM(S): 20 TABLET ORAL at 05:10

## 2025-03-26 RX ADMIN — IPRATROPIUM BROMIDE AND ALBUTEROL SULFATE 3 MILLILITER(S): .5; 2.5 SOLUTION RESPIRATORY (INHALATION) at 05:38

## 2025-03-26 NOTE — PROGRESS NOTE ADULT - PROBLEM SELECTOR PLAN 10
- Full Code  - Contact: Sister Ester [phone]  - PMR recommending Acute Rehab    - Patient and patients sister updated at bedside this afternoon - Full Code  - Contact: Sister Ester [phone]  - PMR recommending Acute Rehab    - Dc planning

## 2025-03-26 NOTE — CHART NOTE - NSCHARTNOTEFT_GEN_A_CORE
Called by sister to bedside patient with RT UE swelling during infusion of IVF. IV appears infiltrated; IVF disconnected and instructed RN to remove PIV. Patient remains with good capillary refill. Will elevate RT UE and continue to monitor.

## 2025-03-26 NOTE — PROGRESS NOTE ADULT - PROBLEM SELECTOR PLAN 7
- S/p Peg placement 3/14. EGD with normal findings  - Feeds changed on 3/25 to Vital 1.5 in setting of loose stool   - Repeat FEES 3/18: Not cleared for enteral diet  - Patient scheduled for repeat MBS today   - Miralax dcd in setting of loose stools

## 2025-03-26 NOTE — SWALLOW VFSS/MBS ASSESSMENT ADULT - ROSENBEK'S PENETRATION ASPIRATION SCALE
(7) contrast passes glottis, visible subglottic residue remains despite patient’s response (aspiration) (1) no aspiration, contrast does not enter airway (8) contrast passes glottis, visible subglottic residue remains, absent patient response (aspiration)

## 2025-03-26 NOTE — PROVIDER CONTACT NOTE (OTHER) - RECOMMENDATIONS
MD made aware.
NP Kendrick Alan notified
EKG ordered
NP Eduarda Ruiz notified & to bedside
DARIAN Dalal notified
Notify provider.
Place patient back on continuous AVAPs & ABG.
NP Eduarda Ruiz notified
NP Kendrick Alan notified
Notify provider.
Wound consult
AKIRA Ruiz notified
EKG to confirm conversion to atrial fibrillation.
patient refrained from continuing to eat, PA made aware
Increase HFNC to 70% Fio2/60 LPM.
NP Kendrick Alan notifed

## 2025-03-26 NOTE — SWALLOW VFSS/MBS ASSESSMENT ADULT - ORAL PHASE
Reduced anterior - posterior transport/Residue in oral cavity/Uncontrolled bolus / spillover in hypopharynx Reduced anterior - posterior transport/Residue in oral cavity/Uncontrolled bolus / spillover in clare-pharynx

## 2025-03-26 NOTE — PROGRESS NOTE ADULT - SUBJECTIVE AND OBJECTIVE BOX
[facility]AT[facility] INPATIENT PROGRESS NOTE     Interval Hx:   03-26-25: Mr. Gr was seen at bedside today.    Meds:   MEDICATIONS  (STANDING):  acetaminophen   IVPB .. 1000 milliGRAM(s) IV Intermittent once  albuterol/ipratropium for Nebulization 3 milliLiter(s) Nebulizer every 6 hours  apixaban 5 milliGRAM(s) Enteral Tube every 12 hours  atorvastatin 20 milliGRAM(s) Oral at bedtime  chlorhexidine 4% Liquid 1 Application(s) Topical daily  cholecalciferol 1000 Unit(s) Oral daily  epoetin krystyna-epbx (RETACRIT) Injectable 37701 Unit(s) SubCutaneous <User Schedule>  escitalopram 15 milliGRAM(s) Oral with breakfast  gabapentin Solution 150 milliGRAM(s) Oral every 12 hours  influenza  Vaccine (HIGH DOSE) 0.5 milliLiter(s) IntraMuscular once  lacosamide Solution 200 milliGRAM(s) Oral two times a day  lamoTRIgine 50 milliGRAM(s) Oral two times a day  lanolin Ointment 1 Application(s) Topical daily  levETIRAcetam  Solution 750 milliGRAM(s) Oral two times a day  lurasidone 20 milliGRAM(s) Oral at bedtime  metoprolol tartrate 25 milliGRAM(s) Oral every 12 hours  nystatin Powder 1 Application(s) Topical every 8 hours  pantoprazole   Suspension 40 milliGRAM(s) Oral daily    MEDICATIONS  (PRN):  acetaminophen     Tablet .. 650 milliGRAM(s) Oral every 6 hours PRN Temp greater or equal to 38C (100.4F), Mild Pain (1 - 3)  aluminum hydroxide/magnesium hydroxide/simethicone Suspension 30 milliLiter(s) Oral every 4 hours PRN Dyspepsia  artificial tears (preservative free) Ophthalmic Solution 1 Drop(s) Both EYES every 6 hours PRN Dry Eyes  melatonin 3 milliGRAM(s) Oral at bedtime PRN Insomnia  ondansetron Injectable 4 milliGRAM(s) IV Push every 8 hours PRN Nausea and/or Vomiting    Vital Signs Last 24 Hrs  T(C): 36.7 (26 Mar 2025 04:53), Max: 37.4 (25 Mar 2025 17:57)  T(F): 98.1 (26 Mar 2025 04:53), Max: 99.3 (25 Mar 2025 17:57)  HR: 87 (26 Mar 2025 04:53) (68 - 88)  BP: 141/84 (26 Mar 2025 04:53) (141/82 - 148/80)  BP(mean): --  RR: 18 (26 Mar 2025 04:53) (18 - 19)  SpO2: 96% (26 Mar 2025 04:53) (94% - 99%)    Parameters below as of 26 Mar 2025 04:53  Patient On (Oxygen Delivery Method): tracheostomy collar    Physical Exam:  Gen: NAD, tracheostomy with ETT  HEENT: EOMI, MMM  Chest: equal chest rise  Cardiac: regular   Abd: non distended    Labs:                        9.3    7.32  )-----------( 178      ( 25 Mar 2025 07:03 )             30.1     CBC Full  -  ( 25 Mar 2025 07:03 )  WBC Count : 7.32 K/uL  RBC Count : 2.97 M/uL  Hemoglobin : 9.3 g/dL  Hematocrit : 30.1 %  Platelet Count - Automated : 178 K/uL  Mean Cell Volume : 101.3 fl  Mean Cell Hemoglobin : 31.3 pg  Mean Cell Hemoglobin Concentration : 30.9 g/dL    03-25    139  |  100  |  26[H]  ----------------------------<  120[H]  4.8   |  28  |  1.55[H]    Ca    9.4      25 Mar 2025 07:03  Phos  3.7     03-25  Mg     2.0     03-25         [facility]AT[facility]/ON[facility] INPATIENT PROGRESS NOTE     Interval Hx:   03-26-25: Mr. Gr was seen at bedside today, awake, without significant overnight events noted, without signs of bleeding, counts and labs reviewed, case discussed, continues on Eliquis 5mg BID     Meds:   MEDICATIONS  (STANDING):  acetaminophen   IVPB .. 1000 milliGRAM(s) IV Intermittent once  albuterol/ipratropium for Nebulization 3 milliLiter(s) Nebulizer every 6 hours  apixaban 5 milliGRAM(s) Enteral Tube every 12 hours  atorvastatin 20 milliGRAM(s) Oral at bedtime  chlorhexidine 4% Liquid 1 Application(s) Topical daily  cholecalciferol 1000 Unit(s) Oral daily  epoetin krystyna-epbx (RETACRIT) Injectable 34075 Unit(s) SubCutaneous <User Schedule>  escitalopram 15 milliGRAM(s) Oral with breakfast  gabapentin Solution 150 milliGRAM(s) Oral every 12 hours  influenza  Vaccine (HIGH DOSE) 0.5 milliLiter(s) IntraMuscular once  lacosamide Solution 200 milliGRAM(s) Oral two times a day  lamoTRIgine 50 milliGRAM(s) Oral two times a day  lanolin Ointment 1 Application(s) Topical daily  levETIRAcetam  Solution 750 milliGRAM(s) Oral two times a day  lurasidone 20 milliGRAM(s) Oral at bedtime  metoprolol tartrate 25 milliGRAM(s) Oral every 12 hours  nystatin Powder 1 Application(s) Topical every 8 hours  pantoprazole   Suspension 40 milliGRAM(s) Oral daily    MEDICATIONS  (PRN):  acetaminophen     Tablet .. 650 milliGRAM(s) Oral every 6 hours PRN Temp greater or equal to 38C (100.4F), Mild Pain (1 - 3)  aluminum hydroxide/magnesium hydroxide/simethicone Suspension 30 milliLiter(s) Oral every 4 hours PRN Dyspepsia  artificial tears (preservative free) Ophthalmic Solution 1 Drop(s) Both EYES every 6 hours PRN Dry Eyes  melatonin 3 milliGRAM(s) Oral at bedtime PRN Insomnia  ondansetron Injectable 4 milliGRAM(s) IV Push every 8 hours PRN Nausea and/or Vomiting    Vital Signs Last 24 Hrs  T(C): 36.7 (26 Mar 2025 04:53), Max: 37.4 (25 Mar 2025 17:57)  T(F): 98.1 (26 Mar 2025 04:53), Max: 99.3 (25 Mar 2025 17:57)  HR: 87 (26 Mar 2025 04:53) (68 - 88)  BP: 141/84 (26 Mar 2025 04:53) (141/82 - 148/80)  BP(mean): --  RR: 18 (26 Mar 2025 04:53) (18 - 19)  SpO2: 96% (26 Mar 2025 04:53) (94% - 99%)    Parameters below as of 26 Mar 2025 04:53  Patient On (Oxygen Delivery Method): tracheostomy collar    Physical Exam:  Gen: NAD, tracheostomy with ETT  HEENT: EOMI, MMM  Chest: equal chest rise  Cardiac: regular   Abd: non distended    Labs:                        9.3    7.32  )-----------( 178      ( 25 Mar 2025 07:03 )             30.1     CBC Full  -  ( 25 Mar 2025 07:03 )  WBC Count : 7.32 K/uL  RBC Count : 2.97 M/uL  Hemoglobin : 9.3 g/dL  Hematocrit : 30.1 %  Platelet Count - Automated : 178 K/uL  Mean Cell Volume : 101.3 fl  Mean Cell Hemoglobin : 31.3 pg  Mean Cell Hemoglobin Concentration : 30.9 g/dL    03-25    139  |  100  |  26[H]  ----------------------------<  120[H]  4.8   |  28  |  1.55[H]    Ca    9.4      25 Mar 2025 07:03  Phos  3.7     03-25  Mg     2.0     03-25

## 2025-03-26 NOTE — SWALLOW VFSS/MBS ASSESSMENT ADULT - SLP GENERAL OBSERVATIONS
Pt received sitting upright in AMANDEEP chair, on FiO2 28% via trach collar, alert, oriented, PMV not on for exam (left in pt's room)

## 2025-03-26 NOTE — PROVIDER CONTACT NOTE (OTHER) - BACKGROUND
68 y/o male with PMHx HTN, HLD, VAZQUEZ on CPAP, CKD stage 3, epilepsy, schizophrenia, metastatic testicular cancer p/w worsening SOB, found to have covid + PNA.
70 y/o male with PMHx HTN, HLD, VAZQUEZ on CPAP, CKD stage 3, epilepsy, schizophrenia, metastatic testicular cancer p/w worsening SOB, found to have covid + PNA.
Dx -AHRF 2/2 COVID vs PNA; recent admission for influenza requiring intubation, 	on  	AVAPS >-s/p 6LNC, s/ pcefepime d/c given s/e of neurotoxicity, s/p vanco
Dx -AHRF 2/2 COVID vs PNA; recent admission for influenza requiring intubation, 	on  	AVAPS >-s/p 6LNC, s/ pcefepime d/c given s/e of neurotoxicity, s/p vanco,
Patient admitted with respiratory failure. Patient with H/O metastatic cancer , developmental delay, covid pneumonia
aDMITTED FROM micu, S/P respiratory failure n
70 y/o male with PMHx HTN, HLD, VAZQUEZ on CPAP, CKD stage 3, epilepsy, schizophrenia, metastatic testicular cancer p/w worsening SOB, found to have covid + PNA.
Patient was admitted for acute chronic resp failure.
Dx -AHRF 2/2 COVID vs PNA; recent admission for influenza requiring intubation, on AVAPS & HFNC, s/ p cefepime d/c given s/e of neurotoxicity, s/p vanco, on iv zosyn. blood Cx redrawn during RRT 2/21
Dx -AHRF 2/2 COVID vs PNA; recent admission for influenza requiring intubation, on AVAPS, , on iv zosyn. blood Cx redrawn during RRT 2/21 for tachypnea increased WOB, multiple RRTs called for inc. WOB
Dx -AHRF 2/2 COVID vs PNA; recent admission for influenza requiring intubation, on AVAPS, , on iv zosyn. blood Cx redrawn during RRT 2/21 for tachypnea increased WOB.
68 y/o male with PMHx HTN, HLD, VAZQUEZ on CPAP, CKD stage 3, epilepsy, schizophrenia, metastatic testicular cancer p/w worsening SOB, found to have covid + PNA.
Dx -AHRF 2/2 COVID vs PNA; ; recent admission for influenza requiring intubation, on  	AVAPS & HFNC, SHAGUFTA on CKDPM. Hx HTN, HLD, VAZQUEZ (on CPAP at bedtime, NC 2 liters during the day
Patient is a 69y old  Male who presents with a chief complaint of Acute on chronic hypoxemic respiratory failure

## 2025-03-26 NOTE — PROVIDER CONTACT NOTE (OTHER) - DATE AND TIME:
18-Feb-2025 21:39
19-Feb-2025 04:32
22-Feb-2025 19:51
18-Feb-2025 14:00
19-Feb-2025 10:00
19-Feb-2025 20:01
23-Feb-2025 03:22
18-Feb-2025 21:40
20-Feb-2025 22:53
26-Mar-2025 18:20
15-Mar-2025 10:25
14-Mar-2025 04:28
15-Mar-2025 10:47
18-Feb-2025 23:02
21-Feb-2025 21:35
20-Feb-2025 22:54

## 2025-03-26 NOTE — SWALLOW VFSS/MBS ASSESSMENT ADULT - ASPIRATION DURING THE SWALLOW - COUGH
material noted along anterior tracheal wall; cough response delayed and ineffective in clearing material from airway/Mild

## 2025-03-26 NOTE — PROVIDER CONTACT NOTE (OTHER) - SITUATION
Patient desaturated to 87% SPO2 while on 6 liters o2. Placed back on continuous AVAPs.
Questioned if HFNC should be administered for oral meds
Wound noticed on the left side of the head
c/o SOB, HR 120s on tele
coughing and desatting noted while patient is eating dinner
Heart rate fluctates between 43-92
PAF 4.4 seconds HR to 150s
pt c/o nausea and HA
Patient desaturated to 87% SPO2 while on HFNC 60% Fio2/50 LPM
pt failed dysphagia screen
Patient bradycardic, HR low as 48.
Patient converted from sinus rhythm to atrial fibrillation on telemetry monitor
Patient respirations 38 on AVAPS. patient s/p RRT for hypoxia.
Patient right ankle colder to touch than left. Patient's HCP expressed concern. VSS. Patient asymptomatic. Patient on heparin drip at 9ml/hr.
sinus tachycardia & episode of SOB
c/o headache rated 8/10, exacerbated by coughing episodes

## 2025-03-26 NOTE — SWALLOW VFSS/MBS ASSESSMENT ADULT - ORAL PHASE COMMENTS
trace oral residue post swallow cleared with repeat swallow; spillage to the AE folds and pyriform sinus trace-mild oral residue post swallow cleared with repeat swallow; spillage to the AE folds and pyriform sinus mild residue post swallow cleared with repeat swallow; spillage to the valleculae

## 2025-03-26 NOTE — PROGRESS NOTE ADULT - ASSESSMENT
69 year old male with a past medical history of hypertension, hyperlipemia VAZQUEZ (on CPAP at bedtime, NC 2 liters during the day), CKD stage 3, epilepsy, schizophrenia, developmental delay, metastatic testicular cancer (s/p orchiectomy, actively on chemotherapy, last dose of etoposide/cisplatin on 6/2024), presenting with acute on chronic hypoxemic respiratory failure, secondary to (a) COVID-19 infection, (b) superimposed pneumonia after recent influenza infection,  Transferred to MICU on 2/23 after RRT on floors due to hypoxia even with max setting AVAPS. Patient started on midodrine and weaned off of levo on 2/24. Failed pressure support on 2/25 and started diuresis with Bumex. CTH was unremarkable and weaned off of Precedex on 3/1. On 3/4 patient extubated and GOC conversations with family revealed that they would want patient to be trached. On 3/7, patient found to have RLL consolidation on POCUS and started on Zosyn, intubated on 3/8 due to increased lethargy and inability to clear oral secretions. Trach placed on 3/12. Transferred to RCU on 3/13. S/p Peg 3/14.  EGD with normal findings. Tolerating TC ATC since 3/16.   TTE performed 3/18 no obvious sign of left atrial appendage. Recommended by Cards and Cleared by heme for Eliquis 5mg BID. H/H stable. Tolerating PMV trials. Failed FEES 3/18. Trach downsized to a 7 Cuffless Portex on 3/20; Fio2 at 28%.       3/26: No events reported overnight, patient scheduled for MBS. Patient has been recommended for Acute Rehab. Feeds changed to Vital 1.5 Yesterday in setting of loose stools. Creat slightly increased will add free water.  69 year old male with a past medical history of hypertension, hyperlipemia VAZQUEZ (on CPAP at bedtime, NC 2 liters during the day), CKD stage 3, epilepsy, schizophrenia, developmental delay, metastatic testicular cancer (s/p orchiectomy, actively on chemotherapy, last dose of etoposide/cisplatin on 6/2024), presenting with acute on chronic hypoxemic respiratory failure, secondary to (a) COVID-19 infection, (b) superimposed pneumonia after recent influenza infection,  Transferred to MICU on 2/23 after RRT on floors due to hypoxia even with max setting AVAPS. Patient started on midodrine and weaned off of levo on 2/24. Failed pressure support on 2/25 and started diuresis with Bumex. CTH was unremarkable and weaned off of Precedex on 3/1. On 3/4 patient extubated and GOC conversations with family revealed that they would want patient to be trached. On 3/7, patient found to have RLL consolidation on POCUS and started on Zosyn, intubated on 3/8 due to increased lethargy and inability to clear oral secretions. Trach placed on 3/12. Transferred to RCU on 3/13. S/p Peg 3/14.  EGD with normal findings. Tolerating TC ATC since 3/16.   TTE performed 3/18 no obvious sign of left atrial appendage. Recommended by Cards and Cleared by heme for Eliquis 5mg BID. H/H stable. Tolerating PMV trials. Failed FEES 3/18. Trach downsized to a 7 Cuffless Portex on 3/20; Fio2 at 28%.       3/26: No events reported overnight, patient scheduled for MBS. Patient has been recommended for Acute Rehab. Feeds changed to Vital 1.5 Yesterday in setting of loose stools. Creat slightly increased will give one liter of LR today.

## 2025-03-26 NOTE — SWALLOW VFSS/MBS ASSESSMENT ADULT - SLP PERTINENT HISTORY OF CURRENT PROBLEM
68 y/o M with PMH of HTN, HLD, VAZQUEZ on CPAP and home O2 (2LNC daytime), CKD, exomfxog29 year old male with a past medical history of hypertension, hyperlipemia VAZQUEZ (on CPAP at bedtime, NC 2 liters during the day), CKD stage 3, epilepsy, schizophrenia, developmental delay, metastatic testicular cancer (s/p orchiectomy, actively on chemotherapy, last dose of etoposide/cisplatin on 6/2024), presenting with acute on chronic hypoxemic respiratory failure, secondary to (a) COVID-19 infection, (b) superimposed pneumonia after recent influenza infection,  Transferred to MICU on 2/23 after RRT on floors due to hypoxia even with max setting AVAPS. Patient started on midodrine and weaned off of levo on 2/24. Failed pressure support on 2/25 and started diuresis with Bumex. CTH was unremarkable and weaned off of Precedex on 3/1.

## 2025-03-26 NOTE — PROVIDER CONTACT NOTE (OTHER) - NAME OF MD/NP/PA/DO NOTIFIED:
Ariane Ortega
DARIAN Ruiz
CAROL Linn
DARIAN Dalal
NP Kendrick Alan
NP Kendrick Alan
AKIRA Ruiz
Edith DELGADOA
ACP Ariane Ortega
NP Kendrick Alan
DARIAN Ruiz
Edith WEBB
Yarelis Funes
ACP Ariane Ortega
Jaspreet Green
Yarelis Funes

## 2025-03-26 NOTE — SWALLOW VFSS/MBS ASSESSMENT ADULT - NS SWALLOW VFSS REC ASPIR MON
Monitor for s/s aspiration/laryngeal penetration. If noted:  D/C p.o. intake, provide non-oral nutrition/hydration/meds, and contact this service @ [phone]/change of breathing pattern/cough/gurgly voice/fever/pneumonia/throat clearing/upper respiratory infection

## 2025-03-26 NOTE — SWALLOW VFSS/MBS ASSESSMENT ADULT - DIAGNOSTIC IMPRESSIONS
Patient presents with oropharyngeal dysphagia in setting of tracheostomy and prolonged hospital course characterized by decreased AP transport resulting in mild oral residue post swallow that reduces with repeat swallow, premature spillage into the pharynx prior to airway closure with delayed swallow trigger most notable with mildly thick and thin liquids, deep laryngeal penetration during the swallow without retrieval with subsequent aspiration on following swallow with mildly thick liquids, and aspiration during the swallow with thin liquids. Pt is inconsistently sensate to aspiration event and cough response does not fully clear material from airway or laryngeal vestibule. Not a candidate for compensatory strategies given trach and cognitive status.

## 2025-03-26 NOTE — PROGRESS NOTE ADULT - PROBLEM SELECTOR PLAN 8
- SHAGUFTA on CKD stage 3 suspected in setting of new infection Covid 19   - CR peaked to 4.34 on 3/3  - Slight increase in Creatnine today; will add free water  - Renvela d/cd Hyperphosphatemia resolved   - Continue Retacrit [id number] U tiw sq - SHAGUFTA on CKD stage 3 suspected in setting of new infection Covid 19   - CR peaked to 4.34 on 3/3  - Slight increase in Creatnine today; will Give on liter of LR   - Renvela d/cd Hyperphosphatemia resolved   - Continue Retacrit [id number] U tiw sq

## 2025-03-26 NOTE — PROGRESS NOTE ADULT - SUBJECTIVE AND OBJECTIVE BOX
DATE OF SERVICE: 03-26-25 @ 13:07    Patient is a 69y old  Male who presents with a chief complaint of Acute on chronic hypoxemic respiratory failure (26 Mar 2025 08:24)      INTERVAL HISTORY: Feels ok.     REVIEW OF SYSTEMS:  CONSTITUTIONAL: No weakness  EYES/ENT: No visual changes;  No throat pain   NECK: No pain or stiffness  RESPIRATORY: No cough, wheezing; No shortness of breath  CARDIOVASCULAR: No chest pain or palpitations  GASTROINTESTINAL: No abdominal  pain. No nausea, vomiting, or hematemesis  GENITOURINARY: No dysuria, frequency or hematuria  NEUROLOGICAL: No stroke like symptoms  SKIN: No rashes    	  MEDICATIONS:  metoprolol tartrate 25 milliGRAM(s) Oral every 12 hours        PHYSICAL EXAM:  T(C): 37.6 (03-26-25 @ 11:16), Max: 37.6 (03-26-25 @ 11:16)  HR: 78 (03-26-25 @ 11:16) (76 - 88)  BP: 130/80 (03-26-25 @ 11:16) (130/80 - 142/87)  RR: 18 (03-26-25 @ 11:16) (18 - 20)  SpO2: 94% (03-26-25 @ 11:16) (94% - 98%)  Wt(kg): --  I&O's Summary    25 Mar 2025 07:01  -  26 Mar 2025 07:00  --------------------------------------------------------  IN: 1680 mL / OUT: 1550 mL / NET: 130 mL          Appearance: In no distress	  HEENT:    PERRL, EOMI	  Cardiovascular:  S1 S2, No JVD  Respiratory: Lungs clear to auscultation	  Gastrointestinal:  Soft, Non-tender, + BS	  Vascularature:  No edema of LE  Psychiatric: Appropriate affect   Neuro: no acute focal deficits                               9.4    8.54  )-----------( 196      ( 26 Mar 2025 07:11 )             30.3     03-26    137  |  100  |  29[H]  ----------------------------<  97  5.1   |  26  |  1.59[H]    Ca    9.4      26 Mar 2025 07:11  Phos  3.4     03-26  Mg     2.1     03-26          Labs personally reviewed      ASSESSMENT/PLAN: 	        69 year old male with a past medical history of hypertension, hyperlipemia VAZQUEZ (on CPAP at bedtime, NC 2 liters during the day), CKD stage 3, epilepsy, schizophrenia, developmental delay, metastatic testicular cancer (s/p orchiectomy, actively on chemotherapy, last dose of etoposide/cisplatin on 6/2024), presenting with acute on chronic hypoxemic respiratory failure, secondary to (a) COVID-19 infection, (b) superimposed pneumonia after recent influenza infection, and/or (c) fluid overload.     Problem/Plan - 1:  ·  Problem: Acute on chronic respiratory failure with hypoxia.   ·  Plan: Likely 2/2 PNA, HF  - Appreciate pulmonology.  - s/p trach 3/12  - Transferred to RCU    Problem/Plan - 2:  ·  Problem: SHAGUFTA (acute kidney injury).   ·  Plan: Has a history of CKD stage 3, Cr 2.38 at baseline.    - Nephrology following  - BUN now improved following de-escalation of diuretics    Problem/Plan - 3:  ·  Problem: Chronic heart failure with preserved ejection fraction.   ·  Plan: TTE done here 1/17/25 technically difficult, but LV systolic fxn appears nl without any regional wall motion abnormalities  - Euvolemic, repeat BNP. On 2/23 2300  - TTE 3/17 1. No obvious Left atrial thrombus; unable to evaluate left atrial appendage on TTE 2. Compared to the transthoracic echocardiogram performed on 2/23/2025, LA difficult to compare.    Problem/Plan - 4:  ·  Problem: New onset Afib RVR (on tele, confirmed with EKG 2/19)   ·  Plan:  - Cont Metoprolol 25mg BID  - Cont Eliquis 5mg BID      Problem/Plan - 5:  ·  Problem: HTN    ·  Plan: Resolved  - 3/19 slightly above target. Will cont to monitor  - 3/24 improved        Magaly Laird, REILLY-NP   Gus Andrew DO [facility]  Cardiovascular Medicine  [address], [address]  Available through call or text on [employer]  Office: [phone]

## 2025-03-26 NOTE — PROGRESS NOTE ADULT - SUBJECTIVE AND OBJECTIVE BOX
Patient is a 69y old  Male who presents with a chief complaint of Acute on chronic hypoxemic respiratory failure (26 Mar 2025 04:56)      Interval Events: No events reported overnight     REVIEW OF SYSTEMS:  [ ] Positive  [ ] All other systems negative  [ ] Unable to assess ROS because ________    Vital Signs Last 24 Hrs  T(C): 36.7 (03-26-25 @ 04:53), Max: 37.4 (03-25-25 @ 17:57)  T(F): 98.1 (03-26-25 @ 04:53), Max: 99.3 (03-25-25 @ 17:57)  HR: 76 (03-26-25 @ 06:21) (68 - 88)  BP: 141/84 (03-26-25 @ 04:53) (141/82 - 148/80)  RR: 18 (03-26-25 @ 04:53) (18 - 19)  SpO2: 95% (03-26-25 @ 06:21) (94% - 99%)    PHYSICAL EXAM:  HEENT:   [ ]Tracheostomy:  [ ]Pupils equal  [ ]No oral lesions  [ ]Abnormal    SKIN  [ ]No Rash  [ ] Abnormal  [ ] pressure    CARDIAC  [ ]Regular  [ ]Abnormal    PULMONARY  [ ]Bilateral Clear Breath Sounds  [ ]Normal Excursion  [ ]Abnormal    GI  [ ]PEG      [ ] +BS		              [ ]Soft, nondistended, nontender	  [ ]Abnormal    MUSCULOSKELETAL                                   [ ]Bedbound                 [ ]Abnormal    [ ]Ambulatory/OOB to chair                           EXTREMITIES                                         [ ]Normal  [ ]Edema                           NEUROLOGIC  [ ] Normal, non focal  [ ] Focal findings:    PSYCHIATRIC  [ ]Alert and appropriate  [ ] Sedated	 [ ]Agitated    :  [place]: [ ] Yes, if yes: Date of Placement:                   [  ] No    LINES: Central Lines [ ] Yes, if yes: Date of Placement                                     [  ] No    HOSPITAL MEDICATIONS:  MEDICATIONS  (STANDING):  acetaminophen   IVPB .. 1000 milliGRAM(s) IV Intermittent once  albuterol/ipratropium for Nebulization 3 milliLiter(s) Nebulizer every 6 hours  apixaban 5 milliGRAM(s) Enteral Tube every 12 hours  atorvastatin 20 milliGRAM(s) Oral at bedtime  chlorhexidine 4% Liquid 1 Application(s) Topical daily  cholecalciferol 1000 Unit(s) Oral daily  epoetin krystyna-epbx (RETACRIT) Injectable 61532 Unit(s) SubCutaneous <User Schedule>  escitalopram 15 milliGRAM(s) Oral with breakfast  gabapentin Solution 150 milliGRAM(s) Oral every 12 hours  influenza  Vaccine (HIGH DOSE) 0.5 milliLiter(s) IntraMuscular once  lacosamide Solution 200 milliGRAM(s) Oral two times a day  lamoTRIgine 50 milliGRAM(s) Oral two times a day  lanolin Ointment 1 Application(s) Topical daily  levETIRAcetam  Solution 750 milliGRAM(s) Oral two times a day  lurasidone 20 milliGRAM(s) Oral at bedtime  metoprolol tartrate 25 milliGRAM(s) Oral every 12 hours  nystatin Powder 1 Application(s) Topical every 8 hours  pantoprazole   Suspension 40 milliGRAM(s) Oral daily    MEDICATIONS  (PRN):  acetaminophen     Tablet .. 650 milliGRAM(s) Oral every 6 hours PRN Temp greater or equal to 38C (100.4F), Mild Pain (1 - 3)  aluminum hydroxide/magnesium hydroxide/simethicone Suspension 30 milliLiter(s) Oral every 4 hours PRN Dyspepsia  artificial tears (preservative free) Ophthalmic Solution 1 Drop(s) Both EYES every 6 hours PRN Dry Eyes  melatonin 3 milliGRAM(s) Oral at bedtime PRN Insomnia  ondansetron Injectable 4 milliGRAM(s) IV Push every 8 hours PRN Nausea and/or Vomiting      LABS:                        9.4    8.54  )-----------( 196      ( 26 Mar 2025 07:11 )             30.3     03-26    137  |  100  |  29[H]  ----------------------------<  97  5.1   |  26  |  1.59[H]    Ca    9.4      26 Mar 2025 07:11  Phos  3.4     03-26  Mg     2.1     03-26        Urinalysis Basic - ( 26 Mar 2025 07:11 )    Color: x / Appearance: x / SG: x / pH: x  Gluc: 97 mg/dL / Ketone: x  / Bili: x / Urobili: x   Blood: x / Protein: x / Nitrite: x   Leuk Esterase: x / RBC: x / WBC x   Sq Epi: x / Non Sq Epi: x / Bacteria: x          CAPILLARY BLOOD GLUCOSE    MICROBIOLOGY:     RADIOLOGY:  [ ] Reviewed and interpreted by me     Patient is a 69y old  Male who presents with a chief complaint of Acute on chronic hypoxemic respiratory failure (26 Mar 2025 04:56)      Interval Events: No events reported overnight     REVIEW OF SYSTEMS:  [ ] Positive  [x] All other systems negative  [ ] Unable to assess ROS because ________    Vital Signs Last 24 Hrs  T(C): 36.7 (03-26-25 @ 04:53), Max: 37.4 (03-25-25 @ 17:57)  T(F): 98.1 (03-26-25 @ 04:53), Max: 99.3 (03-25-25 @ 17:57)  HR: 76 (03-26-25 @ 06:21) (68 - 88)  BP: 141/84 (03-26-25 @ 04:53) (141/82 - 148/80)  RR: 18 (03-26-25 @ 04:53) (18 - 19)  SpO2: 95% (03-26-25 @ 06:21) (94% - 99%)    PHYSICAL EXAM:  HEENT:   [X]Tracheostomy: # 7 cuffless Portex   []Pupils equal  [ ]No oral lesions  [ ]Abnormal    SKIN  [ ]No Rash  [X] Abnormal: macerated skin noted on perineum, buttocks, and inner thighs; Lt occipital scalp injury.  [ ] pressure    CARDIAC  [X]Regular  [ ]Abnormal    PULMONARY  [X]Bilateral Clear Breath Sounds  [ ]Normal Excursion  [ ]Abnormal    GI  [X]PEG site c/d/i      [X] +BS		              [X]Soft, nondistended, nontender	  [ ]Abnormal    MUSCULOSKELETAL                                   [X]Bedbound                 [ ]Abnormal    [ ]Ambulatory/OOB to chair                           EXTREMITIES                                         [X]Normal  [ ]Edema                           NEUROLOGIC  [X] Normal, non focal  [ ] Focal findings:    PSYCHIATRIC  [X]Alert and appropriate Mood   [ ] Sedated	 [ ]Agitated    :  [place]: [ ] Yes, if yes: Date of Placement:                   [X] No    LINES: Central Lines [ ] Yes, if yes: Date of Placement                                     [X] No  HOSPITAL MEDICATIONS:  MEDICATIONS  (STANDING):  acetaminophen   IVPB .. 1000 milliGRAM(s) IV Intermittent once  albuterol/ipratropium for Nebulization 3 milliLiter(s) Nebulizer every 6 hours  apixaban 5 milliGRAM(s) Enteral Tube every 12 hours  atorvastatin 20 milliGRAM(s) Oral at bedtime  chlorhexidine 4% Liquid 1 Application(s) Topical daily  cholecalciferol 1000 Unit(s) Oral daily  epoetin krystyna-epbx (RETACRIT) Injectable 19045 Unit(s) SubCutaneous <User Schedule>  escitalopram 15 milliGRAM(s) Oral with breakfast  gabapentin Solution 150 milliGRAM(s) Oral every 12 hours  influenza  Vaccine (HIGH DOSE) 0.5 milliLiter(s) IntraMuscular once  lacosamide Solution 200 milliGRAM(s) Oral two times a day  lamoTRIgine 50 milliGRAM(s) Oral two times a day  lanolin Ointment 1 Application(s) Topical daily  levETIRAcetam  Solution 750 milliGRAM(s) Oral two times a day  lurasidone 20 milliGRAM(s) Oral at bedtime  metoprolol tartrate 25 milliGRAM(s) Oral every 12 hours  nystatin Powder 1 Application(s) Topical every 8 hours  pantoprazole   Suspension 40 milliGRAM(s) Oral daily    MEDICATIONS  (PRN):  acetaminophen     Tablet .. 650 milliGRAM(s) Oral every 6 hours PRN Temp greater or equal to 38C (100.4F), Mild Pain (1 - 3)  aluminum hydroxide/magnesium hydroxide/simethicone Suspension 30 milliLiter(s) Oral every 4 hours PRN Dyspepsia  artificial tears (preservative free) Ophthalmic Solution 1 Drop(s) Both EYES every 6 hours PRN Dry Eyes  melatonin 3 milliGRAM(s) Oral at bedtime PRN Insomnia  ondansetron Injectable 4 milliGRAM(s) IV Push every 8 hours PRN Nausea and/or Vomiting      LABS:                        9.4    8.54  )-----------( 196      ( 26 Mar 2025 07:11 )             30.3     03-26    137  |  100  |  29[H]  ----------------------------<  97  5.1   |  26  |  1.59[H]    Ca    9.4      26 Mar 2025 07:11  Phos  3.4     03-26  Mg     2.1     03-26        Urinalysis Basic - ( 26 Mar 2025 07:11 )    Color: x / Appearance: x / SG: x / pH: x  Gluc: 97 mg/dL / Ketone: x  / Bili: x / Urobili: x   Blood: x / Protein: x / Nitrite: x   Leuk Esterase: x / RBC: x / WBC x   Sq Epi: x / Non Sq Epi: x / Bacteria: x          CAPILLARY BLOOD GLUCOSE    MICROBIOLOGY:     RADIOLOGY:  [ ] Reviewed and interpreted by me

## 2025-03-26 NOTE — CHART NOTE - NSCHARTNOTEFT_GEN_A_CORE
Patient initiated on Regular diet with moderately thickened liquids this evening after MBS this afternoon as per speech recommendations. Patient with coughing after eating mashed potatoes and brief hypoxemia to 84%, improved with suctioning now saturating 95% in NAD. Case d/w patient and sister at bedside will hold oral feeds this evening and reconsult speech in AM. TFs resumed

## 2025-03-26 NOTE — PROGRESS NOTE ADULT - NS ATTEND AMEND GEN_ALL_CORE FT
69M with a h/o metastatic testicular cancer, epilepsy on AEDs, chronic respiratory failure with hypoxia and hypercapnia on home O2, VAZQUEZ on CPAP, HFpEF admitted for acute on chronic respiratory failure with hypoxia and hypercapnia likely secondary to combination of COVID PNA, bacterial superinfection, and acute pulmonary edema due to acute on chronic HFpEF. Hypotension likely severe sepsis with septic shock. SHAGUFTA likely ATN +/- venous congestion. Repeat CT chest with bilateral GGOs possibly acute pulmonary edema vs early fibrosis vs residual COVID along with L basilar consolidation likely bacterial PNA vs atelectasis. Now s/p trach and PEG.    # Acute on chronic respiratory failure with hypoxia and hypercapnia  # COVID PNA  # Bacterial PNA  # Acute pulmonary edema  # Acute on chronic HFpEF  # Hypotension  # Severe sepsis with septic shock  # SHAGUFTA  # Hypernatremia  # Epilepsy  Clinically much improved, interactive and phonating  - Continue AEDs  - Tolerating TC and PMV   FEN- Trend Cr, avoid nephrotoxins, - Hypernatremia resolved  ID- Completed Levaquin course. Completed course of dexamethasone and remdesivir for COVID  GI- c/w PEG feeds, e/o aspiration on FEES testing on 3/18. Tube feeds changed to vital due to diarrhea. Pending MBS today  CV- Afib- rate controlled, tolerating Eliquis. TTE without evidence of LA thrombus, unable to assess Left atrial appendage  -OOB to chair, PT and OT follow up appreciated. Potential dc to acute rehab this week.   - Full code. 69M with a h/o metastatic testicular cancer, epilepsy on AEDs, chronic respiratory failure with hypoxia and hypercapnia on home O2, VAZQUEZ on CPAP, HFpEF admitted for acute on chronic respiratory failure with hypoxia and hypercapnia likely secondary to combination of COVID PNA, bacterial superinfection, and acute pulmonary edema due to acute on chronic HFpEF. Hypotension likely severe sepsis with septic shock. SHAGUFTA likely ATN +/- venous congestion. Repeat CT chest with bilateral GGOs possibly acute pulmonary edema vs early fibrosis vs residual COVID along with L basilar consolidation likely bacterial PNA vs atelectasis. Now s/p trach and PEG.    # Acute on chronic respiratory failure with hypoxia and hypercapnia  # COVID PNA  # Bacterial PNA  # Acute pulmonary edema  # Acute on chronic HFpEF  # Hypotension  # Severe sepsis with septic shock  # SHAGUFTA  # Hypernatremia  # Epilepsy  Clinically much improved, interactive and phonating  - Continue AEDs  - Tolerating TC and PMV   FEN- Trend Cr, avoid nephrotoxins, - Hypernatremia resolved. Mild SHAGUFTA today - may be secondary to diarrhea, Will give 1L LR.   ID- Completed Levaquin course. Completed course of dexamethasone and remdesivir for COVID  GI- c/w PEG feeds, e/o aspiration on FEES testing on 3/18. Tube feeds changed to vital due to diarrhea. Pending MBS today  CV- Afib- rate controlled, tolerating Eliquis. TTE without evidence of LA thrombus, unable to assess Left atrial appendage  -OOB to chair, PT and OT follow up appreciated. Potential dc to acute rehab this week.   - Full code.

## 2025-03-26 NOTE — PROGRESS NOTE ADULT - NUTRITIONAL ASSESSMENT
This patient has been assessed with a concern for Malnutrition and has been determined to have a diagnosis/diagnoses of Severe protein-calorie malnutrition.    This patient is being managed with:   Diet NPO with Tube Feed-  Tube Feeding Modality: Gastrostomy  Vital 1.5 Garth (VITAL1.5RTH)  Total Volume for 24 Hours (mL): 1440  Continuous  Starting Tube Feed Rate {mL per Hour}: 30  Increase Tube Feed Rate by (mL): 10     Every 4 hours  Until Goal Tube Feed Rate (mL per Hour): 60  Tube Feed Duration (in Hours): 24  Tube Feed Start Time: 06:00  Serenity TF     Qty per Day:  1  Entered: Mar 25 2025 12:58PM

## 2025-03-26 NOTE — SWALLOW VFSS/MBS ASSESSMENT ADULT - COMMENTS
On 3/4 patient extubated and [facility] conversations with family revealed that they would want patient to be trached. On 3/7, patient found to have RLL consolidation on POCUS and started on Zosyn, intubated on 3/8 due to increased lethargy and inability to clear oral secretions. Trach placed on 3/12. Transferred to RCU on 3/13. S/p Peg 3/14.  EGD with normal findings.     3/17: On trach collar ON 40% -- AM ABG 7.37 / 43 / 140 / 25. O2 lowered to 30% -- ABG ordered for AM. TTE to evaluate for possible L atrial thrombus ordered for today 3/17 -- report No obvious Left atrial thrombus; unable to evaluate left atrial appendage on TTE.. Cr downtrending 2.01 --> 1.73. Pseudomonas PNA on + Bal Cx/ recurrent PSA UTI- completed Levaquin 3/15.  Cards says can start BB and Eliquis -- f/u with Heme/onc re: starting. Speaking valve trial.     Speech/swallow hx: Pt is well known to this service. S/p multiple evaluations (bedside & FEES) on prior admissions and this admission. Most recent assessment was FEES on 3/18/25 s/p trach and PEG with recommendations for a puree diet with moderately thick liquids via teaspoon. Seen for speaking valve assessment on 3/17; cleared to utilize speaking valve and has been tolerating well since.

## 2025-03-26 NOTE — PROGRESS NOTE ADULT - ASSESSMENT
Mr. Gr is a 69 year old male with PMHx of seizure disorder, sleep apnea, HTN, chronic idiopathic constipation, stage III CKD, severe obesity, secondary hyperparathyroidism, anemia, thrombocytopenia, hyperlipidemia, testicular cancer under treatment with Dr. Ovalles who is admitted for acute hypoxic respiratory failure secondary to COVID vs superimposed pneumonia with new onset thrombocytopenia. He was recently discharged 02/06/2025 after a tenous stay including intubation in the ICU for respiratory failure in setting of seizure. He had recovered and was discharged to rehab.      Former smoker, quit 14y prior, denied ETOH, drug use. Lives at home. Does not have children. Denies family history of blood disorders or malignancy.  Denies previous workplace related exposures.  Denies previous history of blood transfusions.  Denied history of thromboses.  Denied history of  requiring anticoagulation.     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  Margins were negative.  pT3Nx. CT C/A/P with few left para-aortic and left iliac chain lymph nodes largest measuring 8.1 cm left para-aortic node, bilateral subcentimeter noncalcified pulmonary nodules measuring up to 7 mm. AFP, LDH, hCG were all elevated. 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 Started on adjuvant therapy with Cisplatin+Etoposide, so far completed 4 cycles of treatment with cisplatin dose reduced 50% and Etoposide 50% due to thrombocytopenia.      02/19/2025: on HFNC, noted tachycardia and fever, Klebsiella in urine as well, thrombocytopenia stable/improving, no signs of active bleeding     02/20/2025: developed A.fib with RVR and was placed on heparin drip and pending cardiology eval    02/21/2025: awake, on AVAPS overnight, noted RRT for hypoxia as pt removed HFNC at some point in time, good response thereafter     02/22/2025:  continues on AVAPS this morning, noted RRT overnight for hypoxia, hypercapnia, ICU following, US LE negative for DVT, counts overall stable/improved     02/23/2025: progressive respiratory failure, noted ongoing RTT this morning with pending intubation and transfer to MICU     02/24/2025: intubated 02/23/2025, sedated, on pressor support, no signs of bleeding, counts noted, no signs of bleeding     02/25/2025: continues in MICU, intubated and sedated, no signs of bleeding    02/26/2025: continues in MICU, intubated, without signs of bleeding, continues on heparin drip     02/27/2025:  remains under the care of the ICU, intubated, no signs of bleeding and continues on heparin drip, counts stable, has not required PRBC support this admission    02/28/2025: continues under the care of MICU, continues intubated, no signs of bleeding, on heparin drip    03/01/2025: continues in MICU, intubated, direct josefina IgG positive, eluate negative, not likely to be clinically significant     03/02/2025:  continues in MICU, nursing staff at bedside, no overnight events noted. CTH without acute intracranial pathology     03/03/2025: continues in MICU, intubated, on heparin drip, 1u PRBC overnight, no signs of bleeding, platelet count stable     03/04/2025: awake, moving arms spontaneously on exam, continues without much interaction however. No signs of bleeding, continues heparin drip, continues intubated in MICU     03/05/2025: extubated 03/04/2025, continues in MICU, awake and alert this morning and able to speak full sentences, discussed/reviewed findings, continues on heparin drip     03/06/2025: nursing staff at bedside, bipap overnight, without signs of bleeding, counts noted stable, renal function improving     03/07/2025:  no signs of bleeding, counts noted, continues heparin drip, continues in MICU    03/08/2025: on HFNC, no signs of bleeding, although alert and answering questions, not back to his baseline yet, continues in MICU    03/09/2025: continues in MICU, s/p re-intubation 03/08/2025 given respiratory compromise in the setting of copious secretions as evidenced by bronchoscopy, in setting of fever, now sedated, mild crusting of blood around mouth, without active sign of bleeding, counts noted reviewed, continues on heparin drip     03/10/2025: continues in MICU, no signs of bleeding, discussed with nursing staff at bedside, receiving 1u PRBC due to H/H downtrend, sputum culture with Pseudomonas, ICU and ID recs noted, continues to be intubated     03/11/2025: continues in MICU, intubated and sedated, noted counts, without signs of bleeding, appropriate response to transfusion    03/12/2025: continues in MICU, intubated and sedated, without signs of bleeding     03/13/2025: continues in MICU, s/p Tracheostomy 03/12/2025 with tracheal intubation, continues with sedation     03/14/2025:  awake and alert, mental status much improved, transferred from MICU to [address] [place] [address] 03/13/2025. Without significant events overnight, discussed with nursing staff at bedside, stated pt did well overnight, no signs of bleeding, no fever noted, medications provided via NGT and he is pending PEG-Tube placement today. His case is complex, noted with repeat need for intubations, discussed with pt, he is aware. Discussed with pts primary Oncologist as well.    03/15/2025: awake and alert, no overnight events noted, discussed with nursing staff, he did well overnight, no fevers, no signs of bleeding endorsed. He nods to information. Counts reviewed overall stable, discussed with him as well. Had PEG placed 03/14/2025 03/16/2025: no overnight events noted, comfortable overnight, nursing staff at bedside, stated pt did well, no signs of bleeding, had a BM. No fever reported. Counts reviewed, stable at this time     03/17/2025: no overnight events noted however continues with need for suctioning due to increase secretions, discussed with overnight staff, mild bleeding likely due to trauma from suctioning and recent tracheostomy placement without evidence of active bleeding otherwise. No fevers noted overnight. Had multiple smaller BMs, without evidence of bleeding, Nephrology recs and reviewed noted for Epo    03/18/2025: without significant overnight events, discussed with team at bedside, no evidence of bleeding, noted cardiology recs to start Eliquis 5mg BID for A.fib, currently on Lovenox, previously did require PRBC this admission but has since been stable, renal function seems to be improving, and Plt count improved, ok for AC per cardiology recs with Eliquis, monitor H/H closely     03/19/2025: comfortable, no overnight events noted, discussed with team, had 1 BM overnight without evidence of bleeding, now on Eliquis 5mg BID per cardiology recs for A.fib, renal function stable. Oxygen requirements stable overnight. No fevers reported. Pts primary Oncologist, Dr. Ovalles, aware, case discussed     03/20/2025: without significant events noted, continues on Eliquis, therapeutic, without signs of bleeding, continues on Epo, counts and imaging reviewed, no overnight events noted, continues with tracheostomy without increased oxygen requirements, increased secretions noted     03/21/2025: without significant overnight events noted. Discussed with overnight team, without evidence of bleeding, fever. Had a BM overnight, normal. Without increasing oxygen requirements, Without seizure like activity given hx of malignancy and cerebellar finding previously. Continues on Eliquis. Counts noted and reviewed, anemia, labile thrombocytopenia     03/22/2025:  no overnight events noted, discussed with overnight staff, pt did well, without increased oxygen requirements, without signs of bleeding. Had one BM overnight, without bleeding. Continues on Eliquis, Renal function is stable and appropriate. Counts and labs reviewed, overall stable however anemia persists.     03/23/2025: no significant overnight events noted, discussed with overnight team, without signs of bleeding. Oxygen requirements stable, pulmonary recs noted. Labs and course reviewed     03/24/2025:  comfortable, without significant overnight events noted, continues with Trach, without increased oxygen requirements, discussed with overnight team, had 1 BM overnight. No signs of bleeding noted, Counts and labs reviewed overall improving/stable. On exam without signs of bleeding, continues on Eliquis 5mg BID     03/25/2025: , comfortable this morning, without significant overnight events noted, without signs of bleeding, continues with tracheostomy, stable oxygen requirements. Counts, labs reviewed. Continues on Eliquis 5mg BID and TROY       #Acute hypoxic respiratory failure, Acute, Severe   # COVID  - CT noted with bilateral GGO  - ID following  - Pulmonary following  - Antibiotics per primary team/MICU and ID   - Intubated 02/23/2025 AM, MICU   - extubated 03/04/2025  - Pseudomonas from sputum culture   - Re-intubated 03/09/2025 due to increased work of breathing in setting of increased secretions, not protecting airway, tachypnea, hypoxia   - s/p Tracheostomy 03/12/2025    # Thrombocytopenia, Acute, Moderate   - Did occur during most recent admission and improved to normal prior to discharge   - Without signs of bleeding  - Could be multifactorial, possibly due to infection   - Continue to trend  - Transfuse to maintain Plt > 10k unless actively bleeding then maintain > 50k     # Brain lesion Hx of cancer, Acute, Severe   - pt with hx of seizures  - MRI brain now with 5.4 mm enhancing lesion in the LEFT middle cerebellar peduncle with associated edema suspicious for metastases  - MRI Lumbar negative for acute disease  - Small lesion without mass effect or edema, recommended to correlate per neurology if foci and locus are concordant with seizure activity  - Neurology recs noted, new lesion not likely to cause seizure  - It is rare, but nonetheless not impossible, for testicular cancer to be metastatic to the brain  - He will need another PET-CT, can be completed outpatient once stable if concern can proceed with biopsy at that time   - Previous endocrinology eval for thyroid nodule noted  - AFP/[facility] normal,  previously   - Repeat CTH without acute intracranial pathology     # 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  - Noted Anti E, Anti-K Anti-C antibodies previously  - direct josefina IgG positive, eluate negative, not likely to be clinically significant   - s/p 2u PRBC 03/03/2025 and 03/10/2025   - Monitor for bleeding  - Epo ordered  - Recommend to transfuse to maintain Hb > 7    # Klebsiella UTI, Acute, Severe > Moderate   # Pseudomonas UTI, Acute, Moderate   -Antibiotics per ID and primary team       Recommendations  - Trend CBC daily  - Transfuse to maintain Hb > 7   - Transfuse to maintain Plt >10k unless active bleeding then >50k   - Cardiology recs noted, now on Eliquis 5mg BID for A.fib, monitor H/H         Thank you for allowing me to participate in the care of Mr. Gr please do not hesitate to call or text me if you have further questions or concerns.     Brayan Mart MD  [facility] [place]   Division of Hematology/Oncology  [address], [address]  [place] [address]  P: [phone]  F: [phone]    Attestation:    ----Pt evaluated, >50min spent including face-to-face interaction in addition to chart review, reviewing treatment plan, discussing with care staff in hospital, his outside physician, and managing, reviewing orders, labs and additional time spent documenting, in addition to the patient’s chronic diagnoses as listed in the assessment----        Mr. Gr is a 69 year old male with PMHx of seizure disorder, sleep apnea, HTN, chronic idiopathic constipation, stage III CKD, severe obesity, secondary hyperparathyroidism, anemia, thrombocytopenia, hyperlipidemia, testicular cancer under treatment with Dr. Ovalles who is admitted for acute hypoxic respiratory failure secondary to COVID vs superimposed pneumonia with new onset thrombocytopenia. He was recently discharged 02/06/2025 after a tenous stay including intubation in the ICU for respiratory failure in setting of seizure. He had recovered and was discharged to rehab.      Former smoker, quit 14y prior, denied ETOH, drug use. Lives at home. Does not have children. Denies family history of blood disorders or malignancy.  Denies previous workplace related exposures.  Denies previous history of blood transfusions.  Denied history of thromboses.  Denied history of  requiring anticoagulation.     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  Margins were negative.  pT3Nx. CT C/A/P with few left para-aortic and left iliac chain lymph nodes largest measuring 8.1 cm left para-aortic node, bilateral subcentimeter noncalcified pulmonary nodules measuring up to 7 mm. AFP, LDH, hCG were all elevated. 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 Started on adjuvant therapy with Cisplatin+Etoposide, so far completed 4 cycles of treatment with cisplatin dose reduced 50% and Etoposide 50% due to thrombocytopenia.      02/19/2025: on HFNC, noted tachycardia and fever, Klebsiella in urine as well, thrombocytopenia stable/improving, no signs of active bleeding     02/20/2025: developed A.fib with RVR and was placed on heparin drip and pending cardiology eval    02/21/2025: awake, on AVAPS overnight, noted RRT for hypoxia as pt removed HFNC at some point in time, good response thereafter     02/22/2025:  continues on AVAPS this morning, noted RRT overnight for hypoxia, hypercapnia, ICU following, US LE negative for DVT, counts overall stable/improved     02/23/2025: progressive respiratory failure, noted ongoing RTT this morning with pending intubation and transfer to MICU     02/24/2025: intubated 02/23/2025, sedated, on pressor support, no signs of bleeding, counts noted, no signs of bleeding     02/25/2025: continues in MICU, intubated and sedated, no signs of bleeding    02/26/2025: continues in MICU, intubated, without signs of bleeding, continues on heparin drip     02/27/2025:  remains under the care of the ICU, intubated, no signs of bleeding and continues on heparin drip, counts stable, has not required PRBC support this admission    02/28/2025: continues under the care of MICU, continues intubated, no signs of bleeding, on heparin drip    03/01/2025: continues in MICU, intubated, direct josefina IgG positive, eluate negative, not likely to be clinically significant     03/02/2025:  continues in MICU, nursing staff at bedside, no overnight events noted. CTH without acute intracranial pathology     03/03/2025: continues in MICU, intubated, on heparin drip, 1u PRBC overnight, no signs of bleeding, platelet count stable     03/04/2025: awake, moving arms spontaneously on exam, continues without much interaction however. No signs of bleeding, continues heparin drip, continues intubated in MICU     03/05/2025: extubated 03/04/2025, continues in MICU, awake and alert this morning and able to speak full sentences, discussed/reviewed findings, continues on heparin drip     03/06/2025: nursing staff at bedside, bipap overnight, without signs of bleeding, counts noted stable, renal function improving     03/07/2025:  no signs of bleeding, counts noted, continues heparin drip, continues in MICU    03/08/2025: on HFNC, no signs of bleeding, although alert and answering questions, not back to his baseline yet, continues in MICU    03/09/2025: continues in MICU, s/p re-intubation 03/08/2025 given respiratory compromise in the setting of copious secretions as evidenced by bronchoscopy, in setting of fever, now sedated, mild crusting of blood around mouth, without active sign of bleeding, counts noted reviewed, continues on heparin drip     03/10/2025: continues in MICU, no signs of bleeding, discussed with nursing staff at bedside, receiving 1u PRBC due to H/H downtrend, sputum culture with Pseudomonas, ICU and ID recs noted, continues to be intubated     03/11/2025: continues in MICU, intubated and sedated, noted counts, without signs of bleeding, appropriate response to transfusion    03/12/2025: continues in MICU, intubated and sedated, without signs of bleeding     03/13/2025: continues in MICU, s/p Tracheostomy 03/12/2025 with tracheal intubation, continues with sedation     03/14/2025:  awake and alert, mental status much improved, transferred from MICU to [address] [place] [address] 03/13/2025. Without significant events overnight, discussed with nursing staff at bedside, stated pt did well overnight, no signs of bleeding, no fever noted, medications provided via NGT and he is pending PEG-Tube placement today. His case is complex, noted with repeat need for intubations, discussed with pt, he is aware. Discussed with pts primary Oncologist as well.    03/15/2025: awake and alert, no overnight events noted, discussed with nursing staff, he did well overnight, no fevers, no signs of bleeding endorsed. He nods to information. Counts reviewed overall stable, discussed with him as well. Had PEG placed 03/14/2025 03/16/2025: no overnight events noted, comfortable overnight, nursing staff at bedside, stated pt did well, no signs of bleeding, had a BM. No fever reported. Counts reviewed, stable at this time     03/17/2025: no overnight events noted however continues with need for suctioning due to increase secretions, discussed with overnight staff, mild bleeding likely due to trauma from suctioning and recent tracheostomy placement without evidence of active bleeding otherwise. No fevers noted overnight. Had multiple smaller BMs, without evidence of bleeding, Nephrology recs and reviewed noted for Epo    03/18/2025: without significant overnight events, discussed with team at bedside, no evidence of bleeding, noted cardiology recs to start Eliquis 5mg BID for A.fib, currently on Lovenox, previously did require PRBC this admission but has since been stable, renal function seems to be improving, and Plt count improved, ok for AC per cardiology recs with Eliquis, monitor H/H closely     03/19/2025: comfortable, no overnight events noted, discussed with team, had 1 BM overnight without evidence of bleeding, now on Eliquis 5mg BID per cardiology recs for A.fib, renal function stable. Oxygen requirements stable overnight. No fevers reported. Pts primary Oncologist, Dr. Ovalles, aware, case discussed     03/20/2025: without significant events noted, continues on Eliquis, therapeutic, without signs of bleeding, continues on Epo, counts and imaging reviewed, no overnight events noted, continues with tracheostomy without increased oxygen requirements, increased secretions noted     03/21/2025: without significant overnight events noted. Discussed with overnight team, without evidence of bleeding, fever. Had a BM overnight, normal. Without increasing oxygen requirements, Without seizure like activity given hx of malignancy and cerebellar finding previously. Continues on Eliquis. Counts noted and reviewed, anemia, labile thrombocytopenia     03/22/2025:  no overnight events noted, discussed with overnight staff, pt did well, without increased oxygen requirements, without signs of bleeding. Had one BM overnight, without bleeding. Continues on Eliquis, Renal function is stable and appropriate. Counts and labs reviewed, overall stable however anemia persists.     03/23/2025: no significant overnight events noted, discussed with overnight team, without signs of bleeding. Oxygen requirements stable, pulmonary recs noted. Labs and course reviewed     03/24/2025:  comfortable, without significant overnight events noted, continues with Trach, without increased oxygen requirements, discussed with overnight team, had 1 BM overnight. No signs of bleeding noted, Counts and labs reviewed overall improving/stable. On exam without signs of bleeding, continues on Eliquis 5mg BID     03/25/2025: , comfortable this morning, without significant overnight events noted, without signs of bleeding, continues with tracheostomy, stable oxygen requirements. Counts, labs reviewed. Continues on Eliquis 5mg BID and TROY     03/26/2025: without signs of bleeding, counts and labs reviewed, case discussed, continues on Eliquis 5mg BID       #Acute hypoxic respiratory failure, Acute, Severe   # COVID  - CT noted with bilateral GGO  - ID following  - Pulmonary following  - Antibiotics per primary team/MICU and ID   - Intubated 02/23/2025 AM, MICU   - extubated 03/04/2025  - Pseudomonas from sputum culture   - Re-intubated 03/09/2025 due to increased work of breathing in setting of increased secretions, not protecting airway, tachypnea, hypoxia   - s/p Tracheostomy 03/12/2025    # Thrombocytopenia, Acute, Moderate   - Did occur during most recent admission and improved to normal prior to discharge   - Without signs of bleeding  - Could be multifactorial, possibly due to infection   - Continue to trend  - Transfuse to maintain Plt > 10k unless actively bleeding then maintain > 50k     # Brain lesion Hx of cancer, Acute, Severe   - pt with hx of seizures  - MRI brain now with 5.4 mm enhancing lesion in the LEFT middle cerebellar peduncle with associated edema suspicious for metastases  - MRI Lumbar negative for acute disease  - Small lesion without mass effect or edema, recommended to correlate per neurology if foci and locus are concordant with seizure activity  - Neurology recs noted, new lesion not likely to cause seizure  - It is rare, but nonetheless not impossible, for testicular cancer to be metastatic to the brain  - He will need another PET-CT, can be completed outpatient once stable if concern can proceed with biopsy at that time   - Previous endocrinology eval for thyroid nodule noted  - AFP/[facility] normal,  previously   - Repeat CTH without acute intracranial pathology     # 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  - Noted Anti E, Anti-K Anti-C antibodies previously  - direct josefina IgG positive, eluate negative, not likely to be clinically significant   - s/p 2u PRBC 03/03/2025 and 03/10/2025   - Monitor for bleeding  - Epo ordered  - Recommend to transfuse to maintain Hb > 7    # Klebsiella UTI, Acute, Severe > Moderate   # Pseudomonas UTI, Acute, Moderate   -Antibiotics per ID and primary team       Recommendations  - Trend CBC daily  - Transfuse to maintain Hb > 7   - Transfuse to maintain Plt >10k unless active bleeding then >50k   - Cardiology recs noted, now on Eliquis 5mg BID for A.fib, monitor H/H         Thank you for allowing me to participate in the care of Mr. Gr please do not hesitate to call or text me if you have further questions or concerns.     Brayan Mart MD  [facility] [place]   Division of Hematology/Oncology  [address] [place] [address], [address]  [place] [address]  P: [phone]  F: [phone]    Attestation:    ----Pt evaluated, >50min spent including face-to-face interaction in addition to chart review, reviewing treatment plan, discussing with care staff in hospital, his outside physician, and managing, reviewing orders, labs and additional time spent documenting, in addition to the patient’s chronic diagnoses as listed in the assessment----

## 2025-03-27 LAB
ANION GAP SERPL CALC-SCNC: 13 MMOL/L — SIGNIFICANT CHANGE UP (ref 5–17)
BUN SERPL-MCNC: 29 MG/DL — HIGH (ref 7–23)
CALCIUM SERPL-MCNC: 9.1 MG/DL — SIGNIFICANT CHANGE UP (ref 8.4–10.5)
CHLORIDE SERPL-SCNC: 100 MMOL/L — SIGNIFICANT CHANGE UP (ref 96–108)
CO2 SERPL-SCNC: 25 MMOL/L — SIGNIFICANT CHANGE UP (ref 22–31)
CREAT SERPL-MCNC: 1.64 MG/DL — HIGH (ref 0.5–1.3)
EGFR: 46 ML/MIN/1.73M2 — LOW
EGFR: 46 ML/MIN/1.73M2 — LOW
GLUCOSE SERPL-MCNC: 124 MG/DL — HIGH (ref 70–99)
POTASSIUM SERPL-MCNC: 4.9 MMOL/L — SIGNIFICANT CHANGE UP (ref 3.5–5.3)
POTASSIUM SERPL-SCNC: 4.9 MMOL/L — SIGNIFICANT CHANGE UP (ref 3.5–5.3)
SODIUM SERPL-SCNC: 138 MMOL/L — SIGNIFICANT CHANGE UP (ref 135–145)

## 2025-03-27 PROCEDURE — 99233 SBSQ HOSP IP/OBS HIGH 50: CPT | Mod: FS

## 2025-03-27 RX ADMIN — GABAPENTIN 150 MILLIGRAM(S): 400 CAPSULE ORAL at 06:09

## 2025-03-27 RX ADMIN — Medication 40 MILLIGRAM(S): at 12:44

## 2025-03-27 RX ADMIN — IPRATROPIUM BROMIDE AND ALBUTEROL SULFATE 3 MILLILITER(S): .5; 2.5 SOLUTION RESPIRATORY (INHALATION) at 17:20

## 2025-03-27 RX ADMIN — APIXABAN 5 MILLIGRAM(S): 2.5 TABLET, FILM COATED ORAL at 18:35

## 2025-03-27 RX ADMIN — LACOSAMIDE 200 MILLIGRAM(S): 150 TABLET, FILM COATED ORAL at 06:10

## 2025-03-27 RX ADMIN — Medication 1000 UNIT(S): at 12:44

## 2025-03-27 RX ADMIN — GABAPENTIN 150 MILLIGRAM(S): 400 CAPSULE ORAL at 18:35

## 2025-03-27 RX ADMIN — LEVETIRACETAM 750 MILLIGRAM(S): 10 INJECTION, SOLUTION INTRAVENOUS at 18:35

## 2025-03-27 RX ADMIN — LAMOTRIGINE 50 MILLIGRAM(S): 150 TABLET ORAL at 18:35

## 2025-03-27 RX ADMIN — IPRATROPIUM BROMIDE AND ALBUTEROL SULFATE 3 MILLILITER(S): .5; 2.5 SOLUTION RESPIRATORY (INHALATION) at 11:23

## 2025-03-27 RX ADMIN — METOPROLOL SUCCINATE 25 MILLIGRAM(S): 50 TABLET, EXTENDED RELEASE ORAL at 06:09

## 2025-03-27 RX ADMIN — EPOETIN ALFA 10000 UNIT(S): 10000 SOLUTION INTRAVENOUS; SUBCUTANEOUS at 17:41

## 2025-03-27 RX ADMIN — LEVETIRACETAM 750 MILLIGRAM(S): 10 INJECTION, SOLUTION INTRAVENOUS at 06:09

## 2025-03-27 RX ADMIN — APIXABAN 5 MILLIGRAM(S): 2.5 TABLET, FILM COATED ORAL at 06:09

## 2025-03-27 RX ADMIN — Medication 1 APPLICATION(S): at 12:52

## 2025-03-27 RX ADMIN — IPRATROPIUM BROMIDE AND ALBUTEROL SULFATE 3 MILLILITER(S): .5; 2.5 SOLUTION RESPIRATORY (INHALATION) at 05:57

## 2025-03-27 RX ADMIN — Medication 1 APPLICATION(S): at 12:44

## 2025-03-27 RX ADMIN — LACOSAMIDE 200 MILLIGRAM(S): 150 TABLET, FILM COATED ORAL at 18:34

## 2025-03-27 RX ADMIN — ESCITALOPRAM OXALATE 15 MILLIGRAM(S): 20 TABLET ORAL at 06:09

## 2025-03-27 RX ADMIN — NYSTATIN 1 APPLICATION(S): 100000 CREAM TOPICAL at 06:10

## 2025-03-27 RX ADMIN — NYSTATIN 1 APPLICATION(S): 100000 CREAM TOPICAL at 17:14

## 2025-03-27 RX ADMIN — LAMOTRIGINE 50 MILLIGRAM(S): 150 TABLET ORAL at 06:09

## 2025-03-27 RX ADMIN — METOPROLOL SUCCINATE 25 MILLIGRAM(S): 50 TABLET, EXTENDED RELEASE ORAL at 18:35

## 2025-03-27 NOTE — PROGRESS NOTE ADULT - ASSESSMENT
Mr. Gr is a 69 year old male with PMHx of seizure disorder, sleep apnea, HTN, chronic idiopathic constipation, stage III CKD, severe obesity, secondary hyperparathyroidism, anemia, thrombocytopenia, hyperlipidemia, testicular cancer under treatment with Dr. Ovalles who is admitted for acute hypoxic respiratory failure secondary to COVID vs superimposed pneumonia with new onset thrombocytopenia. He was recently discharged 02/06/2025 after a tenous stay including intubation in the ICU for respiratory failure in setting of seizure. He had recovered and was discharged to rehab.      Former smoker, quit 14y prior, denied ETOH, drug use. Lives at home. Does not have children. Denies family history of blood disorders or malignancy.  Denies previous workplace related exposures.  Denies previous history of blood transfusions.  Denied history of thromboses.  Denied history of  requiring anticoagulation.     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  Margins were negative.  pT3Nx. CT C/A/P with few left para-aortic and left iliac chain lymph nodes largest measuring 8.1 cm left para-aortic node, bilateral subcentimeter noncalcified pulmonary nodules measuring up to 7 mm. AFP, LDH, hCG were all elevated. 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 Started on adjuvant therapy with Cisplatin+Etoposide, so far completed 4 cycles of treatment with cisplatin dose reduced 50% and Etoposide 50% due to thrombocytopenia.      02/19/2025: on HFNC, noted tachycardia and fever, Klebsiella in urine as well, thrombocytopenia stable/improving, no signs of active bleeding     02/20/2025: developed A.fib with RVR and was placed on heparin drip and pending cardiology eval    02/21/2025: awake, on AVAPS overnight, noted RRT for hypoxia as pt removed HFNC at some point in time, good response thereafter     02/22/2025:  continues on AVAPS this morning, noted RRT overnight for hypoxia, hypercapnia, ICU following, US LE negative for DVT, counts overall stable/improved     02/23/2025: progressive respiratory failure, noted ongoing RTT this morning with pending intubation and transfer to MICU     02/24/2025: intubated 02/23/2025, sedated, on pressor support, no signs of bleeding, counts noted, no signs of bleeding     02/25/2025: continues in MICU, intubated and sedated, no signs of bleeding    02/26/2025: continues in MICU, intubated, without signs of bleeding, continues on heparin drip     02/27/2025:  remains under the care of the ICU, intubated, no signs of bleeding and continues on heparin drip, counts stable, has not required PRBC support this admission    02/28/2025: continues under the care of MICU, continues intubated, no signs of bleeding, on heparin drip    03/01/2025: continues in MICU, intubated, direct josefina IgG positive, eluate negative, not likely to be clinically significant     03/02/2025:  continues in MICU, nursing staff at bedside, no overnight events noted. CTH without acute intracranial pathology     03/03/2025: continues in MICU, intubated, on heparin drip, 1u PRBC overnight, no signs of bleeding, platelet count stable     03/04/2025: awake, moving arms spontaneously on exam, continues without much interaction however. No signs of bleeding, continues heparin drip, continues intubated in MICU     03/05/2025: extubated 03/04/2025, continues in MICU, awake and alert this morning and able to speak full sentences, discussed/reviewed findings, continues on heparin drip     03/06/2025: nursing staff at bedside, bipap overnight, without signs of bleeding, counts noted stable, renal function improving     03/07/2025:  no signs of bleeding, counts noted, continues heparin drip, continues in MICU    03/08/2025: on HFNC, no signs of bleeding, although alert and answering questions, not back to his baseline yet, continues in MICU    03/09/2025: continues in MICU, s/p re-intubation 03/08/2025 given respiratory compromise in the setting of copious secretions as evidenced by bronchoscopy, in setting of fever, now sedated, mild crusting of blood around mouth, without active sign of bleeding, counts noted reviewed, continues on heparin drip     03/10/2025: continues in MICU, no signs of bleeding, discussed with nursing staff at bedside, receiving 1u PRBC due to H/H downtrend, sputum culture with Pseudomonas, ICU and ID recs noted, continues to be intubated     03/11/2025: continues in MICU, intubated and sedated, noted counts, without signs of bleeding, appropriate response to transfusion    03/12/2025: continues in MICU, intubated and sedated, without signs of bleeding     03/13/2025: continues in MICU, s/p Tracheostomy 03/12/2025 with tracheal intubation, continues with sedation     03/14/2025:  awake and alert, mental status much improved, transferred from MICU to [address] [place] [address] 03/13/2025. Without significant events overnight, discussed with nursing staff at bedside, stated pt did well overnight, no signs of bleeding, no fever noted, medications provided via NGT and he is pending PEG-Tube placement today. His case is complex, noted with repeat need for intubations, discussed with pt, he is aware. Discussed with pts primary Oncologist as well.    03/15/2025: awake and alert, no overnight events noted, discussed with nursing staff, he did well overnight, no fevers, no signs of bleeding endorsed. He nods to information. Counts reviewed overall stable, discussed with him as well. Had PEG placed 03/14/2025 03/16/2025: no overnight events noted, comfortable overnight, nursing staff at bedside, stated pt did well, no signs of bleeding, had a BM. No fever reported. Counts reviewed, stable at this time     03/17/2025: no overnight events noted however continues with need for suctioning due to increase secretions, discussed with overnight staff, mild bleeding likely due to trauma from suctioning and recent tracheostomy placement without evidence of active bleeding otherwise. No fevers noted overnight. Had multiple smaller BMs, without evidence of bleeding, Nephrology recs and reviewed noted for Epo    03/18/2025: without significant overnight events, discussed with team at bedside, no evidence of bleeding, noted cardiology recs to start Eliquis 5mg BID for A.fib, currently on Lovenox, previously did require PRBC this admission but has since been stable, renal function seems to be improving, and Plt count improved, ok for AC per cardiology recs with Eliquis, monitor H/H closely     03/19/2025: comfortable, no overnight events noted, discussed with team, had 1 BM overnight without evidence of bleeding, now on Eliquis 5mg BID per cardiology recs for A.fib, renal function stable. Oxygen requirements stable overnight. No fevers reported. Pts primary Oncologist, Dr. Ovalles, aware, case discussed     03/20/2025: without significant events noted, continues on Eliquis, therapeutic, without signs of bleeding, continues on Epo, counts and imaging reviewed, no overnight events noted, continues with tracheostomy without increased oxygen requirements, increased secretions noted     03/21/2025: without significant overnight events noted. Discussed with overnight team, without evidence of bleeding, fever. Had a BM overnight, normal. Without increasing oxygen requirements, Without seizure like activity given hx of malignancy and cerebellar finding previously. Continues on Eliquis. Counts noted and reviewed, anemia, labile thrombocytopenia     03/22/2025:  no overnight events noted, discussed with overnight staff, pt did well, without increased oxygen requirements, without signs of bleeding. Had one BM overnight, without bleeding. Continues on Eliquis, Renal function is stable and appropriate. Counts and labs reviewed, overall stable however anemia persists.     03/23/2025: no significant overnight events noted, discussed with overnight team, without signs of bleeding. Oxygen requirements stable, pulmonary recs noted. Labs and course reviewed     03/24/2025:  comfortable, without significant overnight events noted, continues with Trach, without increased oxygen requirements, discussed with overnight team, had 1 BM overnight. No signs of bleeding noted, Counts and labs reviewed overall improving/stable. On exam without signs of bleeding, continues on Eliquis 5mg BID     03/25/2025: , comfortable this morning, without significant overnight events noted, without signs of bleeding, continues with tracheostomy, stable oxygen requirements. Counts, labs reviewed. Continues on Eliquis 5mg BID and TROY     03/26/2025: without signs of bleeding, counts and labs reviewed, case discussed, continues on Eliquis 5mg BID       #Acute hypoxic respiratory failure, Acute, Severe   # COVID  - CT noted with bilateral GGO  - ID following  - Pulmonary following  - Antibiotics per primary team/MICU and ID   - Intubated 02/23/2025 AM, MICU   - extubated 03/04/2025  - Pseudomonas from sputum culture   - Re-intubated 03/09/2025 due to increased work of breathing in setting of increased secretions, not protecting airway, tachypnea, hypoxia   - s/p Tracheostomy 03/12/2025    # Thrombocytopenia, Acute, Moderate   - Did occur during most recent admission and improved to normal prior to discharge   - Without signs of bleeding  - Could be multifactorial, possibly due to infection   - Continue to trend  - Transfuse to maintain Plt > 10k unless actively bleeding then maintain > 50k     # Brain lesion Hx of cancer, Acute, Severe   - pt with hx of seizures  - MRI brain now with 5.4 mm enhancing lesion in the LEFT middle cerebellar peduncle with associated edema suspicious for metastases  - MRI Lumbar negative for acute disease  - Small lesion without mass effect or edema, recommended to correlate per neurology if foci and locus are concordant with seizure activity  - Neurology recs noted, new lesion not likely to cause seizure  - It is rare, but nonetheless not impossible, for testicular cancer to be metastatic to the brain  - He will need another PET-CT, can be completed outpatient once stable if concern can proceed with biopsy at that time   - Previous endocrinology eval for thyroid nodule noted  - AFP/[facility] normal,  previously   - Repeat CTH without acute intracranial pathology     # 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  - Noted Anti E, Anti-K Anti-C antibodies previously  - direct josefina IgG positive, eluate negative, not likely to be clinically significant   - s/p 2u PRBC 03/03/2025 and 03/10/2025   - Monitor for bleeding  - Epo ordered  - Recommend to transfuse to maintain Hb > 7    # Klebsiella UTI, Acute, Severe > Moderate   # Pseudomonas UTI, Acute, Moderate   -Antibiotics per ID and primary team       Recommendations  - Trend CBC daily  - Transfuse to maintain Hb > 7   - Transfuse to maintain Plt >10k unless active bleeding then >50k   - Cardiology recs noted, now on Eliquis 5mg BID for A.fib, monitor H/H         Thank you for allowing me to participate in the care of Mr. Gr please do not hesitate to call or text me if you have further questions or concerns.     Brayan Mart MD  [facility] [place]   Division of Hematology/Oncology  [address] [place] [address], [address]  [place] [address]  P: [phone]  F: [phone]    Attestation:    ----Pt evaluated, >50min spent including face-to-face interaction in addition to chart review, reviewing treatment plan, discussing with care staff in hospital, his outside physician, and managing, reviewing orders, labs and additional time spent documenting, in addition to the patient’s chronic diagnoses as listed in the assessment----        Mr. Gr is a 69 year old male with PMHx of seizure disorder, sleep apnea, HTN, chronic idiopathic constipation, stage III CKD, severe obesity, secondary hyperparathyroidism, anemia, thrombocytopenia, hyperlipidemia, testicular cancer under treatment with Dr. Ovalles who is admitted for acute hypoxic respiratory failure secondary to COVID vs superimposed pneumonia with new onset thrombocytopenia. He was recently discharged 02/06/2025 after a tenous stay including intubation in the ICU for respiratory failure in setting of seizure. He had recovered and was discharged to rehab.      Former smoker, quit 14y prior, denied ETOH, drug use. Lives at home. Does not have children. Denies family history of blood disorders or malignancy.  Denies previous workplace related exposures.  Denies previous history of blood transfusions.  Denied history of thromboses.  Denied history of  requiring anticoagulation.     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  Margins were negative.  pT3Nx. CT C/A/P with few left para-aortic and left iliac chain lymph nodes largest measuring 8.1 cm left para-aortic node, bilateral subcentimeter noncalcified pulmonary nodules measuring up to 7 mm. AFP, LDH, hCG were all elevated. 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 Started on adjuvant therapy with Cisplatin+Etoposide, so far completed 4 cycles of treatment with cisplatin dose reduced 50% and Etoposide 50% due to thrombocytopenia.      02/19/2025: on HFNC, noted tachycardia and fever, Klebsiella in urine as well, thrombocytopenia stable/improving, no signs of active bleeding     02/20/2025: developed A.fib with RVR and was placed on heparin drip and pending cardiology eval    02/21/2025: awake, on AVAPS overnight, noted RRT for hypoxia as pt removed HFNC at some point in time, good response thereafter     02/22/2025:  continues on AVAPS this morning, noted RRT overnight for hypoxia, hypercapnia, ICU following, US LE negative for DVT, counts overall stable/improved     02/23/2025: progressive respiratory failure, noted ongoing RTT this morning with pending intubation and transfer to MICU     02/24/2025: intubated 02/23/2025, sedated, on pressor support, no signs of bleeding, counts noted, no signs of bleeding     02/25/2025: continues in MICU, intubated and sedated, no signs of bleeding    02/26/2025: continues in MICU, intubated, without signs of bleeding, continues on heparin drip     02/27/2025:  remains under the care of the ICU, intubated, no signs of bleeding and continues on heparin drip, counts stable, has not required PRBC support this admission    02/28/2025: continues under the care of MICU, continues intubated, no signs of bleeding, on heparin drip    03/01/2025: continues in MICU, intubated, direct josefina IgG positive, eluate negative, not likely to be clinically significant     03/02/2025:  continues in MICU, nursing staff at bedside, no overnight events noted. CTH without acute intracranial pathology     03/03/2025: continues in MICU, intubated, on heparin drip, 1u PRBC overnight, no signs of bleeding, platelet count stable     03/04/2025: awake, moving arms spontaneously on exam, continues without much interaction however. No signs of bleeding, continues heparin drip, continues intubated in MICU     03/05/2025: extubated 03/04/2025, continues in MICU, awake and alert this morning and able to speak full sentences, discussed/reviewed findings, continues on heparin drip     03/06/2025: nursing staff at bedside, bipap overnight, without signs of bleeding, counts noted stable, renal function improving     03/07/2025:  no signs of bleeding, counts noted, continues heparin drip, continues in MICU    03/08/2025: on HFNC, no signs of bleeding, although alert and answering questions, not back to his baseline yet, continues in MICU    03/09/2025: continues in MICU, s/p re-intubation 03/08/2025 given respiratory compromise in the setting of copious secretions as evidenced by bronchoscopy, in setting of fever, now sedated, mild crusting of blood around mouth, without active sign of bleeding, counts noted reviewed, continues on heparin drip     03/10/2025: continues in MICU, no signs of bleeding, discussed with nursing staff at bedside, receiving 1u PRBC due to H/H downtrend, sputum culture with Pseudomonas, ICU and ID recs noted, continues to be intubated     03/11/2025: continues in MICU, intubated and sedated, noted counts, without signs of bleeding, appropriate response to transfusion    03/12/2025: continues in MICU, intubated and sedated, without signs of bleeding     03/13/2025: continues in MICU, s/p Tracheostomy 03/12/2025 with tracheal intubation, continues with sedation     03/14/2025:  awake and alert, mental status much improved, transferred from MICU to [address] [place] [address] 03/13/2025. Without significant events overnight, discussed with nursing staff at bedside, stated pt did well overnight, no signs of bleeding, no fever noted, medications provided via NGT and he is pending PEG-Tube placement today. His case is complex, noted with repeat need for intubations, discussed with pt, he is aware. Discussed with pts primary Oncologist as well.    03/15/2025: awake and alert, no overnight events noted, discussed with nursing staff, he did well overnight, no fevers, no signs of bleeding endorsed. He nods to information. Counts reviewed overall stable, discussed with him as well. Had PEG placed 03/14/2025 03/16/2025: no overnight events noted, comfortable overnight, nursing staff at bedside, stated pt did well, no signs of bleeding, had a BM. No fever reported. Counts reviewed, stable at this time     03/17/2025: no overnight events noted however continues with need for suctioning due to increase secretions, discussed with overnight staff, mild bleeding likely due to trauma from suctioning and recent tracheostomy placement without evidence of active bleeding otherwise. No fevers noted overnight. Had multiple smaller BMs, without evidence of bleeding, Nephrology recs and reviewed noted for Epo    03/18/2025: without significant overnight events, discussed with team at bedside, no evidence of bleeding, noted cardiology recs to start Eliquis 5mg BID for A.fib, currently on Lovenox, previously did require PRBC this admission but has since been stable, renal function seems to be improving, and Plt count improved, ok for AC per cardiology recs with Eliquis, monitor H/H closely     03/19/2025: comfortable, no overnight events noted, discussed with team, had 1 BM overnight without evidence of bleeding, now on Eliquis 5mg BID per cardiology recs for A.fib, renal function stable. Oxygen requirements stable overnight. No fevers reported. Pts primary Oncologist, Dr. Ovalles, aware, case discussed     03/20/2025: without significant events noted, continues on Eliquis, therapeutic, without signs of bleeding, continues on Epo, counts and imaging reviewed, no overnight events noted, continues with tracheostomy without increased oxygen requirements, increased secretions noted     03/21/2025: without significant overnight events noted. Discussed with overnight team, without evidence of bleeding, fever. Had a BM overnight, normal. Without increasing oxygen requirements, Without seizure like activity given hx of malignancy and cerebellar finding previously. Continues on Eliquis. Counts noted and reviewed, anemia, labile thrombocytopenia     03/22/2025:  no overnight events noted, discussed with overnight staff, pt did well, without increased oxygen requirements, without signs of bleeding. Had one BM overnight, without bleeding. Continues on Eliquis, Renal function is stable and appropriate. Counts and labs reviewed, overall stable however anemia persists.     03/23/2025: no significant overnight events noted, discussed with overnight team, without signs of bleeding. Oxygen requirements stable, pulmonary recs noted. Labs and course reviewed     03/24/2025:  comfortable, without significant overnight events noted, continues with Trach, without increased oxygen requirements, discussed with overnight team, had 1 BM overnight. No signs of bleeding noted, Counts and labs reviewed overall improving/stable. On exam without signs of bleeding, continues on Eliquis 5mg BID     03/25/2025: , comfortable this morning, without significant overnight events noted, without signs of bleeding, continues with tracheostomy, stable oxygen requirements. Counts, labs reviewed. Continues on Eliquis 5mg BID and TROY     03/26/2025: without signs of bleeding, counts and labs reviewed, case discussed, continues on Eliquis 5mg BID     03/27/2025: yesterday with coughing event with eating, overnight without changes in exam, no signs of bleeding, noted, counts and labs reviewed, stable/improving overall         #Acute hypoxic respiratory failure, Acute, Severe   # COVID  - CT noted with bilateral GGO  - ID following  - Pulmonary following  - Antibiotics per primary team/MICU and ID   - Intubated 02/23/2025 AM, MICU   - extubated 03/04/2025  - Pseudomonas from sputum culture   - Re-intubated 03/09/2025 due to increased work of breathing in setting of increased secretions, not protecting airway, tachypnea, hypoxia   - s/p Tracheostomy 03/12/2025    # Thrombocytopenia, Acute, Moderate   - Did occur during most recent admission and improved to normal prior to discharge   - Without signs of bleeding  - Could be multifactorial, possibly due to infection   - Continue to trend  - Transfuse to maintain Plt > 10k unless actively bleeding then maintain > 50k     # Brain lesion Hx of cancer, Acute, Severe   - pt with hx of seizures  - MRI brain now with 5.4 mm enhancing lesion in the LEFT middle cerebellar peduncle with associated edema suspicious for metastases  - MRI Lumbar negative for acute disease  - Small lesion without mass effect or edema, recommended to correlate per neurology if foci and locus are concordant with seizure activity  - Neurology recs noted, new lesion not likely to cause seizure  - It is rare, but nonetheless not impossible, for testicular cancer to be metastatic to the brain  - He will need another PET-CT, can be completed outpatient once stable if concern can proceed with biopsy at that time   - Previous endocrinology eval for thyroid nodule noted  - AFP/[facility] normal,  previously   - Repeat CTH without acute intracranial pathology     # 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  - Noted Anti E, Anti-K Anti-C antibodies previously  - direct josefina IgG positive, eluate negative, not likely to be clinically significant   - s/p 2u PRBC 03/03/2025 and 03/10/2025   - Monitor for bleeding  - Epo ordered  - Recommend to transfuse to maintain Hb > 7    # Klebsiella UTI, Acute, Severe > Moderate   # Pseudomonas UTI, Acute, Moderate   -Antibiotics per ID and primary team       Recommendations  - Trend CBC daily  - Transfuse to maintain Hb > 7   - Transfuse to maintain Plt >10k unless active bleeding then >50k   - Cardiology recs noted, now on Eliquis 5mg BID for A.fib, monitor H/H         Thank you for allowing me to participate in the care of Mr. Gr please do not hesitate to call or text me if you have further questions or concerns.     Brayan Mart MD  [facility]   Division of Hematology/Oncology  [facility], [address]  [place] [address]  P: [phone]  F: [phone]    Attestation:    ----Pt evaluated, >50min spent including face-to-face interaction in addition to chart review, reviewing treatment plan, discussing with care staff in hospital, his outside physician, and managing, reviewing orders, labs and additional time spent documenting, in addition to the patient’s chronic diagnoses as listed in the assessment----

## 2025-03-27 NOTE — PROGRESS NOTE ADULT - SUBJECTIVE AND OBJECTIVE BOX
[facility] INPATIENT PROGRESS NOTE     Interval Hx:   03-27-25: Mr. Gr was seen at bedside today.    Meds:   MEDICATIONS  (STANDING):  albuterol/ipratropium for Nebulization 3 milliLiter(s) Nebulizer every 6 hours  apixaban 5 milliGRAM(s) Enteral Tube every 12 hours  atorvastatin 20 milliGRAM(s) Oral at bedtime  chlorhexidine 4% Liquid 1 Application(s) Topical daily  cholecalciferol 1000 Unit(s) Oral daily  epoetin krystyna-epbx (RETACRIT) Injectable 11666 Unit(s) SubCutaneous <User Schedule>  escitalopram 15 milliGRAM(s) Oral with breakfast  gabapentin Solution 150 milliGRAM(s) Oral every 12 hours  influenza  Vaccine (HIGH DOSE) 0.5 milliLiter(s) IntraMuscular once  lacosamide Solution 200 milliGRAM(s) Oral two times a day  lamoTRIgine 50 milliGRAM(s) Oral two times a day  lanolin Ointment 1 Application(s) Topical daily  levETIRAcetam  Solution 750 milliGRAM(s) Oral two times a day  lurasidone 20 milliGRAM(s) Oral at bedtime  metoprolol tartrate 25 milliGRAM(s) Oral every 12 hours  nystatin Powder 1 Application(s) Topical every 8 hours  pantoprazole   Suspension 40 milliGRAM(s) Oral daily    MEDICATIONS  (PRN):  acetaminophen     Tablet .. 650 milliGRAM(s) Oral every 6 hours PRN Temp greater or equal to 38C (100.4F), Mild Pain (1 - 3)  aluminum hydroxide/magnesium hydroxide/simethicone Suspension 30 milliLiter(s) Oral every 4 hours PRN Dyspepsia  artificial tears (preservative free) Ophthalmic Solution 1 Drop(s) Both EYES every 6 hours PRN Dry Eyes  melatonin 3 milliGRAM(s) Oral at bedtime PRN Insomnia  ondansetron Injectable 4 milliGRAM(s) IV Push every 8 hours PRN Nausea and/or Vomiting    Vital Signs Last 24 Hrs  T(C): 37.5 (27 Mar 2025 04:08), Max: 37.6 (26 Mar 2025 11:16)  T(F): 99.5 (27 Mar 2025 04:08), Max: 99.7 (26 Mar 2025 11:16)  HR: 79 (27 Mar 2025 04:08) (74 - 89)  BP: 126/68 (27 Mar 2025 04:08) (126/68 - 147/84)  BP(mean): --  RR: 18 (27 Mar 2025 04:08) (17 - 20)  SpO2: 97% (27 Mar 2025 04:08) (94% - 98%)    Parameters below as of 27 Mar 2025 04:08  Patient On (Oxygen Delivery Method): tracheostomy collar    Physical Exam:  Gen: NAD, tracheostomy with ETT  HEENT: EOMI, MMM  Chest: equal chest rise  Cardiac: regular   Abd: non distended    Labs:                        9.4    8.54  )-----------( 196      ( 26 Mar 2025 07:11 )             30.3     CBC Full  -  ( 26 Mar 2025 07:11 )  WBC Count : 8.54 K/uL  RBC Count : 2.98 M/uL  Hemoglobin : 9.4 g/dL  Hematocrit : 30.3 %  Platelet Count - Automated : 196 K/uL  Mean Cell Volume : 101.7 fl  Mean Cell Hemoglobin : 31.5 pg  Mean Cell Hemoglobin Concentration : 31.0 g/dL    03-26    137  |  100  |  29[H]  ----------------------------<  97  5.1   |  26  |  1.59[H]    Ca    9.4      26 Mar 2025 07:11  Phos  3.4     03-26  Mg     2.1     03-26         [facility] INPATIENT PROGRESS NOTE     Interval Hx:   03-27-25: Mr. Gr was seen at bedside today, yesterday with coughing event with eating, overnight without changes in exam, no signs of bleeding, noted, counts and labs reviewed, stable/improving overall     Meds:   MEDICATIONS  (STANDING):  albuterol/ipratropium for Nebulization 3 milliLiter(s) Nebulizer every 6 hours  apixaban 5 milliGRAM(s) Enteral Tube every 12 hours  atorvastatin 20 milliGRAM(s) Oral at bedtime  chlorhexidine 4% Liquid 1 Application(s) Topical daily  cholecalciferol 1000 Unit(s) Oral daily  epoetin krystyna-epbx (RETACRIT) Injectable 46999 Unit(s) SubCutaneous <User Schedule>  escitalopram 15 milliGRAM(s) Oral with breakfast  gabapentin Solution 150 milliGRAM(s) Oral every 12 hours  influenza  Vaccine (HIGH DOSE) 0.5 milliLiter(s) IntraMuscular once  lacosamide Solution 200 milliGRAM(s) Oral two times a day  lamoTRIgine 50 milliGRAM(s) Oral two times a day  lanolin Ointment 1 Application(s) Topical daily  levETIRAcetam  Solution 750 milliGRAM(s) Oral two times a day  lurasidone 20 milliGRAM(s) Oral at bedtime  metoprolol tartrate 25 milliGRAM(s) Oral every 12 hours  nystatin Powder 1 Application(s) Topical every 8 hours  pantoprazole   Suspension 40 milliGRAM(s) Oral daily    MEDICATIONS  (PRN):  acetaminophen     Tablet .. 650 milliGRAM(s) Oral every 6 hours PRN Temp greater or equal to 38C (100.4F), Mild Pain (1 - 3)  aluminum hydroxide/magnesium hydroxide/simethicone Suspension 30 milliLiter(s) Oral every 4 hours PRN Dyspepsia  artificial tears (preservative free) Ophthalmic Solution 1 Drop(s) Both EYES every 6 hours PRN Dry Eyes  melatonin 3 milliGRAM(s) Oral at bedtime PRN Insomnia  ondansetron Injectable 4 milliGRAM(s) IV Push every 8 hours PRN Nausea and/or Vomiting    Vital Signs Last 24 Hrs  T(C): 37.5 (27 Mar 2025 04:08), Max: 37.6 (26 Mar 2025 11:16)  T(F): 99.5 (27 Mar 2025 04:08), Max: 99.7 (26 Mar 2025 11:16)  HR: 79 (27 Mar 2025 04:08) (74 - 89)  BP: 126/68 (27 Mar 2025 04:08) (126/68 - 147/84)  BP(mean): --  RR: 18 (27 Mar 2025 04:08) (17 - 20)  SpO2: 97% (27 Mar 2025 04:08) (94% - 98%)    Parameters below as of 27 Mar 2025 04:08  Patient On (Oxygen Delivery Method): tracheostomy collar    Physical Exam:  Gen: NAD, tracheostomy with ETT  HEENT: EOMI, MMM  Chest: equal chest rise  Cardiac: regular   Abd: non distended    Labs:                        9.4    8.54  )-----------( 196      ( 26 Mar 2025 07:11 )             30.3     CBC Full  -  ( 26 Mar 2025 07:11 )  WBC Count : 8.54 K/uL  RBC Count : 2.98 M/uL  Hemoglobin : 9.4 g/dL  Hematocrit : 30.3 %  Platelet Count - Automated : 196 K/uL  Mean Cell Volume : 101.7 fl  Mean Cell Hemoglobin : 31.5 pg  Mean Cell Hemoglobin Concentration : 31.0 g/dL    03-26    137  |  100  |  29[H]  ----------------------------<  97  5.1   |  26  |  1.59[H]    Ca    9.4      26 Mar 2025 07:11  Phos  3.4     03-26  Mg     2.1     03-26

## 2025-03-27 NOTE — PROGRESS NOTE ADULT - SUBJECTIVE AND OBJECTIVE BOX
DATE OF SERVICE: 03-27-25 @ 14:14    Patient is a 69y old  Male who presents with a chief complaint of Acute on chronic hypoxemic respiratory failure (27 Mar 2025 12:16)      INTERVAL HISTORY: Feels ok.     REVIEW OF SYSTEMS:  CONSTITUTIONAL: No weakness  EYES/ENT: No visual changes;  No throat pain   NECK: No pain or stiffness  RESPIRATORY: No cough, wheezing; No shortness of breath  CARDIOVASCULAR: No chest pain or palpitations  GASTROINTESTINAL: No abdominal  pain. No nausea, vomiting, or hematemesis  GENITOURINARY: No dysuria, frequency or hematuria  NEUROLOGICAL: No stroke like symptoms  SKIN: No rashes    	  MEDICATIONS:  metoprolol tartrate 25 milliGRAM(s) Oral every 12 hours        PHYSICAL EXAM:  T(C): 36.7 (03-27-25 @ 12:11), Max: 37.5 (03-27-25 @ 00:00)  HR: 80 (03-27-25 @ 12:11) (69 - 89)  BP: 110/71 (03-27-25 @ 12:11) (110/71 - 147/84)  RR: 18 (03-27-25 @ 12:11) (17 - 20)  SpO2: 96% (03-27-25 @ 12:11) (94% - 99%)  Wt(kg): --  I&O's Summary    26 Mar 2025 07:01  -  27 Mar 2025 07:00  --------------------------------------------------------  IN: 2385 mL / OUT: 1600 mL / NET: 785 mL          Appearance: In no distress	  HEENT:    PERRL, EOMI	  Cardiovascular:  S1 S2, No JVD  Respiratory: Lungs clear to auscultation	  Gastrointestinal:  Soft, Non-tender, + BS	  Vascularature:  No edema of LE  Psychiatric: Appropriate affect   Neuro: no acute focal deficits                               9.4    8.54  )-----------( 196      ( 26 Mar 2025 07:11 )             30.3     03-27    138  |  100  |  29[H]  ----------------------------<  124[H]  4.9   |  25  |  1.64[H]    Ca    9.1      27 Mar 2025 06:41  Phos  3.4     03-26  Mg     2.1     03-26          Labs personally reviewed      ASSESSMENT/PLAN: 	  69 year old male with a past medical history of hypertension, hyperlipemia VAZQUEZ (on CPAP at bedtime, NC 2 liters during the day), CKD stage 3, epilepsy, schizophrenia, developmental delay, metastatic testicular cancer (s/p orchiectomy, actively on chemotherapy, last dose of etoposide/cisplatin on 6/2024), presenting with acute on chronic hypoxemic respiratory failure, secondary to (a) COVID-19 infection, (b) superimposed pneumonia after recent influenza infection, and/or (c) fluid overload.     Problem/Plan - 1:  ·  Problem: Acute on chronic respiratory failure with hypoxia.   ·  Plan: Likely 2/2 PNA, HF  - Appreciate pulmonology.  - s/p trach 3/12  - Transferred to RCU    Problem/Plan - 2:  ·  Problem: SHAGUFTA (acute kidney injury).   ·  Plan: Has a history of CKD stage 3, Cr 2.38 at baseline.    - Nephrology following  - BUN now improved following de-escalation of diuretics    Problem/Plan - 3:  ·  Problem: Chronic heart failure with preserved ejection fraction.   ·  Plan: TTE done here 1/17/25 technically difficult, but LV systolic fxn appears nl without any regional wall motion abnormalities  - Euvolemic, repeat BNP. On 2/23 2300  - TTE 3/17 1. No obvious Left atrial thrombus; unable to evaluate left atrial appendage on TTE 2. Compared to the transthoracic echocardiogram performed on 2/23/2025, LA difficult to compare.    Problem/Plan - 4:  ·  Problem: New onset Afib RVR (on tele, confirmed with EKG 2/19)   ·  Plan:  - Cont Metoprolol 25mg BID  - Cont Eliquis 5mg BID      Problem/Plan - 5:  ·  Problem: HTN    ·  Plan: Resolved  - 3/19 slightly above target. Will cont to monitor  - 3/24 improved          Magaly Laird, AG-NP   Gus Andrew DO [facility]  Cardiovascular Medicine  [address], [address]  Available through call or text on [employer]  Office: [phone]

## 2025-03-27 NOTE — PROGRESS NOTE ADULT - ASSESSMENT
69 year old male with a past medical history of hypertension, hyperlipemia VAZQUEZ (on CPAP at bedtime, NC 2 liters during the day), CKD stage 3, epilepsy, schizophrenia, developmental delay, metastatic testicular cancer (s/p orchiectomy, actively on chemotherapy, last dose of etoposide/cisplatin on 6/2024), presenting with acute on chronic hypoxemic respiratory failure, secondary to (a) COVID-19 infection, (b) superimposed pneumonia after recent influenza infection,  Transferred to MICU on 2/23 after RRT on floors due to hypoxia even with max setting AVAPS. Patient started on midodrine and weaned off of levo on 2/24. Failed pressure support on 2/25 and started diuresis with Bumex. CTH was unremarkable and weaned off of Precedex on 3/1. On 3/4 patient extubated and GOC conversations with family revealed that they would want patient to be trached. On 3/7, patient found to have RLL consolidation on POCUS and started on Zosyn, intubated on 3/8 due to increased lethargy and inability to clear oral secretions. Trach placed on 3/12. Transferred to RCU on 3/13. S/p Peg 3/14.  EGD with normal findings. Tolerating TC ATC since 3/16.   TTE performed 3/18 no obvious sign of left atrial appendage. Recommended by Cards and Cleared by heme for Eliquis 5mg BID. H/H stable. Tolerating PMV trials. Failed FEES 3/18. Trach downsized to a 7 Cuffless Portex on 3/20; Fio2 at 28%.       3/26: No events reported overnight, patient scheduled for MBS. Patient has been recommended for Acute Rehab. Feeds changed to Vital 1.5 Yesterday in setting of loose stools. Creat slightly increased will give one liter of LR today.  69 year old male with a past medical history of hypertension, hyperlipemia VAZQUEZ (on CPAP at bedtime, NC 2 liters during the day), CKD stage 3, epilepsy, schizophrenia, developmental delay, metastatic testicular cancer (s/p orchiectomy, actively on chemotherapy, last dose of etoposide/cisplatin on 6/2024), presenting with acute on chronic hypoxemic respiratory failure, secondary to (a) COVID-19 infection, (b) superimposed pneumonia after recent influenza infection,  Transferred to MICU on 2/23 after RRT on floors due to hypoxia even with max setting AVAPS. Patient started on midodrine and weaned off of levo on 2/24. Failed pressure support on 2/25 and started diuresis with Bumex. CTH was unremarkable and weaned off of Precedex on 3/1. On 3/4 patient extubated and GOC conversations with family revealed that they would want patient to be trached. On 3/7, patient found to have RLL consolidation on POCUS and started on Zosyn, intubated on 3/8 due to increased lethargy and inability to clear oral secretions. Trach placed on 3/12. Transferred to RCU on 3/13. S/p Peg 3/14.  EGD with normal findings. Tolerating TC ATC since 3/16.   TTE performed 3/18 no obvious sign of left atrial appendage. Recommended by Cards and Cleared by heme for Eliquis 5mg BID. H/H stable. Tolerating PMV trials. Failed FEES 3/18. Trach downsized to a 7 Cuffless Portex on 3/20; Fio2 at 28%. MBS       3/27: Passed MBS and diet advanced to regular w/ thickened liquids, had brief hypoxemia episode after eating mashed potatoes. O2 sats improved with suctioning. Today patient seen by speech who recc to remain on recc diet and complete upright positioning - ideally OOB to chair; otherwise, can utilize bed in chair position/ elevate HOB.  DCP tomorrow to [facility] pending bed availability

## 2025-03-27 NOTE — CHART NOTE - NSCHARTNOTESELECT_GEN_ALL_CORE
Coughing with feeds/Event Note
Critical Care Echocardiography
EEG Prelim Report
Event Note
Event Note
Infiltrated IV/Event Note
Limited POCUS/Event Note
MICU Acceptance/Event Note
MICU Transfer Note/Transfer Note
Nutrition Services
Overnight Event Note/Event Note
POCUS
Speech-Swallow
Speech-Swallow
Event Note
Event Note
Increased WOB/Event Note
Neurology
Nutrition Services
POCUS Note
Speech and Swallow

## 2025-03-27 NOTE — PROGRESS NOTE ADULT - PROBLEM SELECTOR PLAN 7
- S/p Peg placement 3/14. EGD with normal findings  - Feeds changed on 3/25 to Vital 1.5 in setting of loose stool   - Repeat FEES 3/18: Not cleared for enteral diet  - Patient scheduled for repeat MBS today   - Miralax dcd in setting of loose stools - S/p Peg placement 3/14. EGD with normal findings  - Feeds changed on 3/25 to Vital 1.5 in setting of loose stool   - Repeat FEES 3/18: Not cleared for enteral diet  - Passed MBS 3/27, diet advanced to regular w/ thickened liquids    - Miralax dcd in setting of loose stools

## 2025-03-27 NOTE — PROGRESS NOTE ADULT - NS ATTEND AMEND GEN_ALL_CORE FT
I have made amendments to the documentation where necessary. Additional comments: 69M with a h/o metastatic testicular cancer, epilepsy on AEDs, chronic respiratory failure with hypoxia and hypercapnia on home O2, VAZQUEZ on CPAP, HFpEF admitted for acute on chronic respiratory failure with hypoxia and hypercapnia likely secondary to combination of COVID PNA, bacterial superinfection, and acute pulmonary edema due to acute on chronic HFpEF. Hypotension likely severe sepsis with septic shock. SHAGUFTA likely ATN +/- venous congestion. Repeat CT chest with bilateral GGOs possibly acute pulmonary edema vs early fibrosis vs residual COVID along with L basilar consolidation likely bacterial PNA vs atelectasis. Now s/p trach and PEG.    # Acute on chronic respiratory failure with hypoxia and hypercapnia  # COVID PNA  # Bacterial PNA  # Acute pulmonary edema  # Acute on chronic HFpEF  # Hypotension  # Severe sepsis with septic shock  # SHAGUFTA  # Hypernatremia  # Epilepsy  Clinically much improved, interactive and phonating  - Continue AEDs  - Tolerating TC and PMV   FEN- Trend Cr, avoid nephrotoxins, - Hypernatremia resolved. Mild SHAGUFTA today  ID- Completed Levaquin course. Completed course of dexamethasone and remdesivir for COVID  GI- c/w PEG feeds, e/o aspiration on FEES testing on 3/18. Tube feeds changed to vital due to diarrhea. Passed MBS however episode of desaturation while eating yesterday.   CV- Afib- rate controlled, tolerating Eliquis. TTE without evidence of LA thrombus, unable to assess Left atrial appendage  -OOB to chair, PT and OT follow up appreciated. Potential dc to acute rehab this week pending speech re-evaluation.   - Full code.

## 2025-03-27 NOTE — CHART NOTE - NSCHARTNOTEFT_GEN_A_CORE
*Full note to follow     Recommendations:  1. Reinitiate PO diet as per MBS recommendations on 3/26: Regular diet with moderately thick liquids.   2. Please ensure complete upright positioning - ideally OOB to chair; otherwise, can utilize bed in chair position/ elevate HOB.   3. Supervision/ monitoring for signs of aspiration/ diet intolerance. If noted, discontinue PO intake and contact this service [phone]   4. Good oral care   5. This service will continue to follow 69 year old male with PMH of hypertension, hyperlipemia VAZQUEZ (on CPAP at bedtime, NC 2 liters during the day), CKD stage 3, epilepsy, schizophrenia, developmental delay, metastatic testicular cancer (s/p orchiectomy, actively on chemotherapy, last dose of etoposide/cisplatin on 6/2024), presenting with acute on chronic hypoxemic respiratory failure, secondary to (a) COVID-19 infection, (b) superimposed pneumonia after recent influenza infection. Transferred to MICU on 2/23 after RRT on floors due to hypoxia even with max setting AVAPS. Patient started on midodrine and weaned off of levo on 2/24. Failed pressure support on 2/25 and started diuresis with Bumex. CTH was unremarkable and weaned off of Precedex on 3/1. On 3/4 patient extubated. On 3/7, patient found to have RLL consolidation on POCUS and started on Zosyn, intubated on 3/8 due to increased lethargy and inability to clear oral secretions. Trach placed on 3/12. Transferred to RCU on 3/13. S/p Peg 3/14.  EGD with normal findings. Tolerating TC ATC since 3/16. Trach downsized to a 7 Cuffless Portex on 3/20; Fio2 at 28%.     Speech/swallow hx: Pt is well known to this service. S/p multiple evaluations (bedside & FEES) on prior admissions and this admission. Cleared to utilize speaking valve on 3/17. Seen most recently on 3/26 for MBS with recommendations for regular diet with moderately thick liquids.     Interim events: As per PA on 3/18 in PM, "Patient initiated on Regular diet with moderately thickened liquids this evening after MBS this afternoon as per speech recommendations. Patient with coughing after eating mashed potatoes and brief hypoxemia to 84%, improved with suctioning now saturating 95% in NAD. Case d/w patient and sister at bedside will hold oral feeds this evening and reconsult speech in AM. TFs resumed."      Recommendations:  1. Reinitiate PO diet as per MBS recommendations on 3/26: Regular diet with moderately thick liquids.   2. Please ensure complete upright positioning - ideally OOB to chair; otherwise, can utilize bed in chair position/ elevate HOB.   3. Supervision/ monitoring for signs of aspiration/ diet intolerance. If noted, discontinue PO intake and contact this service [phone]   4. Good oral care   5. This service will continue to follow    Case discussed with CAROL Sharma RN, and pt's sister, Ester.     Brittani Shaffer, SHERRI-SLP ([employer]) 69 year old male with PMH of hypertension, hyperlipemia VAZQUEZ (on CPAP at bedtime, NC 2 liters during the day), CKD stage 3, epilepsy, schizophrenia, developmental delay, metastatic testicular cancer (s/p orchiectomy, actively on chemotherapy, last dose of etoposide/cisplatin on 6/2024), presenting with acute on chronic hypoxemic respiratory failure, secondary to (a) COVID-19 infection, (b) superimposed pneumonia after recent influenza infection. Transferred to MICU on 2/23 after RRT on floors due to hypoxia even with max setting AVAPS. Patient started on midodrine and weaned off of levo on 2/24. Failed pressure support on 2/25 and started diuresis with Bumex. CTH was unremarkable and weaned off of Precedex on 3/1. On 3/4 patient extubated. On 3/7, patient found to have RLL consolidation on POCUS and started on Zosyn, intubated on 3/8 due to increased lethargy and inability to clear oral secretions. Trach placed on 3/12. Transferred to RCU on 3/13. S/p Peg 3/14.  EGD with normal findings. Tolerating TC ATC since 3/16. Trach downsized to a 7 Cuffless Portex on 3/20; Fio2 at 28%.     Speech/swallow hx: Pt is well known to this service from previous admissions and current admission. Cleared to utilize speaking valve on 3/17. Seen yesterday (3/26) for MBS with recommendations for regular diet with moderately thick liquids.     Interim events: As per PA on 3/18 in PM, "Patient initiated on Regular diet with moderately thickened liquids this evening after MBS this afternoon as per speech recommendations. Patient with coughing after eating mashed potatoes and brief hypoxemia to 84%, improved with suctioning now saturating 95% in NAD. Case d/w patient and sister at bedside will hold oral feeds this evening and reconsult speech in AM. TFs resumed."     Today, patient seen for dysphagia follow-up. Encountered OOB to chair, awake/alert, on 28% TC. PMV placed on hub of trach. Pt self-administered PO trials of purees (~3 oz), regular solids, and cup of moderately thick liquids. Swallow profile unremarkable; no overt signs of aspiration, vitals stable throughout, no complaints voiced. Pt denies any difficulty swallowing. No change in presentation from yesterday's MBS. Provided patient with continuing education re: MBS findings/ aspiration precautions. Pt left in NAD. Discussed case with CAROL Sharma; will cautiously reinitiate PO diet with close monitoring.     Recommendations:  1. Reinitiate PO diet as per MBS recommendations on 3/26: Regular diet with moderately thick liquids.   2. Please ensure complete upright positioning - ideally OOB to chair; otherwise, can utilize bed in chair position/ elevate HOB.   3. Supervision/ monitoring for signs of aspiration/ diet intolerance. If noted, discontinue PO intake and contact this service [phone]   4. Good oral care   5. This service will continue to follow     Case discussed with CAROL Sharma, RN, and pt's sister, Ester.     Brittani Shaffer, SHERRI-SLP ([employer])

## 2025-03-27 NOTE — PROGRESS NOTE ADULT - SUBJECTIVE AND OBJECTIVE BOX
Patient is a 69y old  Male who presents with a chief complaint of Acute on chronic hypoxemic respiratory failure (27 Mar 2025 04:17)      Interval Events:    REVIEW OF SYSTEMS:  [ ] Positive  [ ] All other systems negative  [ ] Unable to assess ROS because ________    Vital Signs Last 24 Hrs  T(C): 37.5 (03-27-25 @ 04:08), Max: 37.6 (03-26-25 @ 11:16)  T(F): 99.5 (03-27-25 @ 04:08), Max: 99.7 (03-26-25 @ 11:16)  HR: 69 (03-27-25 @ 06:15) (69 - 89)  BP: 126/68 (03-27-25 @ 04:08) (126/68 - 147/84)  RR: 18 (03-27-25 @ 04:08) (17 - 20)  SpO2: 99% (03-27-25 @ 06:15) (94% - 99%)PHYSICAL EXAM:  HEENT:   [ ]Tracheostomy:  [ ]Pupils equal  [ ]No oral lesions  [ ]Abnormal        SKIN  [ ]No Rash  [ ] Abnormal  [ ] pressure    CARDIAC  [ ]Regular  [ ]Abnormal    PULMONARY  [ ]Bilateral Clear Breath Sounds  [ ]Normal Excursion  [ ]Abnormal    GI  [ ]PEG      [ ] +BS		              [ ]Soft, nondistended, nontender	  [ ]Abnormal    MUSCULOSKELETAL                                   [ ]Bedbound                 [ ]Abnormal    [ ]Ambulatory/OOB to chair                           EXTREMITIES                                         [ ]Normal  [ ]Edema                           NEUROLOGIC  [ ] Normal, non focal  [ ] Focal findings:    PSYCHIATRIC  [ ]Alert and appropriate  [ ] Sedated	 [ ]Agitated    :  [place]: [ ] Yes, if yes: Date of Placement:                   [  ] No    LINES: Central Lines [ ] Yes, if yes: Date of Placement                                     [  ] No    HOSPITAL MEDICATIONS:  MEDICATIONS  (STANDING):  albuterol/ipratropium for Nebulization 3 milliLiter(s) Nebulizer every 6 hours  apixaban 5 milliGRAM(s) Enteral Tube every 12 hours  atorvastatin 20 milliGRAM(s) Oral at bedtime  chlorhexidine 4% Liquid 1 Application(s) Topical daily  cholecalciferol 1000 Unit(s) Oral daily  epoetin krystyna-epbx (RETACRIT) Injectable 80384 Unit(s) SubCutaneous <User Schedule>  escitalopram 15 milliGRAM(s) Oral with breakfast  gabapentin Solution 150 milliGRAM(s) Oral every 12 hours  influenza  Vaccine (HIGH DOSE) 0.5 milliLiter(s) IntraMuscular once  lacosamide Solution 200 milliGRAM(s) Oral two times a day  lamoTRIgine 50 milliGRAM(s) Oral two times a day  lanolin Ointment 1 Application(s) Topical daily  levETIRAcetam  Solution 750 milliGRAM(s) Oral two times a day  lurasidone 20 milliGRAM(s) Oral at bedtime  metoprolol tartrate 25 milliGRAM(s) Oral every 12 hours  nystatin Powder 1 Application(s) Topical every 8 hours  pantoprazole   Suspension 40 milliGRAM(s) Oral daily    MEDICATIONS  (PRN):  acetaminophen     Tablet .. 650 milliGRAM(s) Oral every 6 hours PRN Temp greater or equal to 38C (100.4F), Mild Pain (1 - 3)  aluminum hydroxide/magnesium hydroxide/simethicone Suspension 30 milliLiter(s) Oral every 4 hours PRN Dyspepsia  artificial tears (preservative free) Ophthalmic Solution 1 Drop(s) Both EYES every 6 hours PRN Dry Eyes  melatonin 3 milliGRAM(s) Oral at bedtime PRN Insomnia  ondansetron Injectable 4 milliGRAM(s) IV Push every 8 hours PRN Nausea and/or Vomiting      LABS:                        9.4    8.54  )-----------( 196      ( 26 Mar 2025 07:11 )             30.3     03-27    138  |  100  |  29[H]  ----------------------------<  124[H]  4.9   |  25  |  1.64[H]    Ca    9.1      27 Mar 2025 06:41  Phos  3.4     03-26  Mg     2.1     03-26        Urinalysis Basic - ( 27 Mar 2025 06:41 )    Color: x / Appearance: x / SG: x / pH: x  Gluc: 124 mg/dL / Ketone: x  / Bili: x / Urobili: x   Blood: x / Protein: x / Nitrite: x   Leuk Esterase: x / RBC: x / WBC x   Sq Epi: x / Non Sq Epi: x / Bacteria: x          CAPILLARY BLOOD GLUCOSE    MICROBIOLOGY:     RADIOLOGY:  [ ] Reviewed and interpreted by me     Patient is a 69y old  Male who presents with a chief complaint of Acute on chronic hypoxemic respiratory failure (27 Mar 2025 04:17)      Interval Events: No acute events                           Observed on continuous pulse ox overnight, Awaiting ABG      REVIEW OF SYSTEMS:  [ ] Positive  [x ] All other systems negative  [  ] Unable to assess ROS because ________    Vital Signs Last 24 Hrs  T(C): 37.5 (03-27-25 @ 04:08), Max: 37.6 (03-26-25 @ 11:16)  T(F): 99.5 (03-27-25 @ 04:08), Max: 99.7 (03-26-25 @ 11:16)  HR: 69 (03-27-25 @ 06:15) (69 - 89)  BP: 126/68 (03-27-25 @ 04:08) (126/68 - 147/84)  RR: 18 (03-27-25 @ 04:08) (17 - 20)  SpO2: 99% (03-27-25 @ 06:15) (94% - 99%)    PHYSICAL EXAM:    HEENT:   [X]Tracheostomy: # 7 cuffless Portex, + PMV use   []Pupils equal  [ ]No oral lesions  [ ]Abnormal    SKIN  [ ]No Rash  [X] Abnormal: macerated skin noted on perineum, buttocks, and inner thighs; Lt occipital scalp injury.  [ ] pressure    CARDIAC  [X]Regular  [ ]Abnormal    PULMONARY  [X]Bilateral Clear Breath Sounds  [ ]Normal Excursion  [ ]Abnormal    GI  [X]PEG site c/d/i      [X] +BS		              [X]Soft, nondistended, nontender	  [ ]Abnormal    MUSCULOSKELETAL                                   [X]Bedbound                 [ ]Abnormal    [ ]Ambulatory/OOB to chair                           EXTREMITIES                                         [X]Normal  [ ]Edema                           NEUROLOGIC  [X] Normal, non focal  [ ] Focal findings:    PSYCHIATRIC  [X]Alert and appropriate Mood   [ ] Sedated	 [ ]Agitated    :  [place]: [ ] Yes, if yes: Date of Placement:                   [X] No    LINES: Central Lines [ ] Yes, if yes: Date of Placement                                     [X] No    HOSPITAL MEDICATIONS:  MEDICATIONS  (STANDING):  albuterol/ipratropium for Nebulization 3 milliLiter(s) Nebulizer every 6 hours  apixaban 5 milliGRAM(s) Enteral Tube every 12 hours  atorvastatin 20 milliGRAM(s) Oral at bedtime  chlorhexidine 4% Liquid 1 Application(s) Topical daily  cholecalciferol 1000 Unit(s) Oral daily  epoetin krystyna-epbx (RETACRIT) Injectable 29338 Unit(s) SubCutaneous <User Schedule>  escitalopram 15 milliGRAM(s) Oral with breakfast  gabapentin Solution 150 milliGRAM(s) Oral every 12 hours  influenza  Vaccine (HIGH DOSE) 0.5 milliLiter(s) IntraMuscular once  lacosamide Solution 200 milliGRAM(s) Oral two times a day  lamoTRIgine 50 milliGRAM(s) Oral two times a day  lanolin Ointment 1 Application(s) Topical daily  levETIRAcetam  Solution 750 milliGRAM(s) Oral two times a day  lurasidone 20 milliGRAM(s) Oral at bedtime  metoprolol tartrate 25 milliGRAM(s) Oral every 12 hours  nystatin Powder 1 Application(s) Topical every 8 hours  pantoprazole   Suspension 40 milliGRAM(s) Oral daily    MEDICATIONS  (PRN):  acetaminophen     Tablet .. 650 milliGRAM(s) Oral every 6 hours PRN Temp greater or equal to 38C (100.4F), Mild Pain (1 - 3)  aluminum hydroxide/magnesium hydroxide/simethicone Suspension 30 milliLiter(s) Oral every 4 hours PRN Dyspepsia  artificial tears (preservative free) Ophthalmic Solution 1 Drop(s) Both EYES every 6 hours PRN Dry Eyes  melatonin 3 milliGRAM(s) Oral at bedtime PRN Insomnia  ondansetron Injectable 4 milliGRAM(s) IV Push every 8 hours PRN Nausea and/or Vomiting      LABS:                        9.4    8.54  )-----------( 196      ( 26 Mar 2025 07:11 )             30.3     03-27    138  |  100  |  29[H]  ----------------------------<  124[H]  4.9   |  25  |  1.64[H]    Ca    9.1      27 Mar 2025 06:41  Phos  3.4     03-26  Mg     2.1     03-26        Urinalysis Basic - ( 27 Mar 2025 06:41 )    Color: x / Appearance: x / SG: x / pH: x  Gluc: 124 mg/dL / Ketone: x  / Bili: x / Urobili: x   Blood: x / Protein: x / Nitrite: x   Leuk Esterase: x / RBC: x / WBC x   Sq Epi: x / Non Sq Epi: x / Bacteria: x          CAPILLARY BLOOD GLUCOSE    MICROBIOLOGY:     RADIOLOGY:  [ ] Reviewed and interpreted by me

## 2025-03-27 NOTE — PROGRESS NOTE ADULT - SUBJECTIVE AND OBJECTIVE BOX
[facility]   [phone]  RENAL FOLLOW UP NOTE  --------------------------------------------------------------------------------  Chief Complaint:    24 hour events/subjective:    patient seen and examined.   appears comfortable    PAST HISTORY  --------------------------------------------------------------------------------  No significant changes to PMH, PSH, FHx, SHx, unless otherwise noted    ALLERGIES & MEDICATIONS  --------------------------------------------------------------------------------  Allergies    seasonal allergies (Unknown)  No Known Drug Allergies    Intolerances    latex (Rash)    Standing Inpatient Medications  albuterol/ipratropium for Nebulization 3 milliLiter(s) Nebulizer every 6 hours  apixaban 5 milliGRAM(s) Enteral Tube every 12 hours  atorvastatin 20 milliGRAM(s) Oral at bedtime  chlorhexidine 4% Liquid 1 Application(s) Topical daily  cholecalciferol 1000 Unit(s) Oral daily  epoetin krystyna-epbx (RETACRIT) Injectable 49234 Unit(s) SubCutaneous <User Schedule>  escitalopram 15 milliGRAM(s) Oral with breakfast  gabapentin Solution 150 milliGRAM(s) Oral every 12 hours  influenza  Vaccine (HIGH DOSE) 0.5 milliLiter(s) IntraMuscular once  lacosamide Solution 200 milliGRAM(s) Oral two times a day  lamoTRIgine 50 milliGRAM(s) Oral two times a day  lanolin Ointment 1 Application(s) Topical daily  levETIRAcetam  Solution 750 milliGRAM(s) Oral two times a day  lurasidone 20 milliGRAM(s) Oral at bedtime  metoprolol tartrate 25 milliGRAM(s) Oral every 12 hours  nystatin Powder 1 Application(s) Topical every 8 hours  pantoprazole   Suspension 40 milliGRAM(s) Oral daily    PRN Inpatient Medications  acetaminophen     Tablet .. 650 milliGRAM(s) Oral every 6 hours PRN  aluminum hydroxide/magnesium hydroxide/simethicone Suspension 30 milliLiter(s) Oral every 4 hours PRN  artificial tears (preservative free) Ophthalmic Solution 1 Drop(s) Both EYES every 6 hours PRN  melatonin 3 milliGRAM(s) Oral at bedtime PRN  ondansetron Injectable 4 milliGRAM(s) IV Push every 8 hours PRN      REVIEW OF SYSTEMS  --------------------------------------------------------------------------------      VITALS/PHYSICAL EXAM  --------------------------------------------------------------------------------  T(C): 36.7 (03-27-25 @ 12:11), Max: 37.5 (03-27-25 @ 00:00)  HR: 80 (03-27-25 @ 12:11) (69 - 89)  BP: 110/71 (03-27-25 @ 12:11) (110/71 - 147/84)  RR: 18 (03-27-25 @ 12:11) (17 - 20)  SpO2: 96% (03-27-25 @ 12:11) (94% - 99%)  Wt(kg): --        03-26-25 @ 07:01  -  03-27-25 @ 07:00  --------------------------------------------------------  IN: 2385 mL / OUT: 1600 mL / NET: 785 mL      Physical Exam:  	              Gen trach collar communicative   	Pulm: decrease bs, +coarse bs    	CV: No JVD. RRR, S1S2; no rub  	Abd: +BS, soft, nondistended, Peg  	UE: Warm, no cyanosis  no clubbing,  no edema;  	LE: Warm, no cyanosis  no clubbing, no edema    LABS/STUDIES  --------------------------------------------------------------------------------              9.4    8.54  >-----------<  196      [03-26-25 @ 07:11]              30.3     138  |  100  |  29  ----------------------------<  124      [03-27-25 @ 06:41]  4.9   |  25  |  1.64        Ca     9.1     [03-27-25 @ 06:41]      Mg     2.1     [03-26-25 @ 07:11]      Phos  3.4     [03-26-25 @ 07:11]            Creatinine Trend:  SCr 1.64 [03-27 @ 06:41]  SCr 1.59 [03-26 @ 07:11]  SCr 1.55 [03-25 @ 07:03]  SCr 1.44 [03-24 @ 06:08]  SCr 1.43 [03-23 @ 07:25]            Ferritin [id number]      [02-23-25 @ 06:15]  TSH 0.87      [01-25-25 @ 11:31]

## 2025-03-27 NOTE — PROGRESS NOTE ADULT - ASSESSMENT
69 year old male with a past medical history of hypertension, hyperlipemia VAZQUEZ (on CPAP at bedtime, NC 2 liters during the day), CKD stage 3, epilepsy, schizophrenia, developmental delay, metastatic testicular cancer (s/p orchiectomy, actively on chemotherapy, last dose of etoposide/cisplatin on 6/2024) presenting with worsening shortness of breath. Patient was recently hospitalized at [facility] from January 27th 2025 to February 6th 2025 for seizure and influenza virus infection, which required intubation. He was sent to rehab (originally from home) from hospital. He returns due to sudden onset shortness of breath and worsening oxygenation. Of note, he tested positive for COVID-19       1-  CKDIII  2- covid-19  3- anemia  4- hypotension  5- respiratory failure         SHAGUFTA suspected in setting of new infection. covid 19   creatinine is improving to ckd III   bp is in range   anemia, trend hb   retacrit [id number] U tiw sq  tube feeds -> Vital  strict I/O  trend creatinine and electroytes daily

## 2025-03-27 NOTE — PROGRESS NOTE ADULT - NUTRITIONAL ASSESSMENT
This patient has been assessed with a concern for Malnutrition and has been determined to have a diagnosis/diagnoses of Severe protein-calorie malnutrition.    This patient is being managed with:   Diet NPO with Tube Feed-  Tube Feeding Modality: Gastrostomy  Vital 1.5 Garth (VITAL1.5RTH)  Total Volume for 24 Hours (mL): 1440  Continuous  Starting Tube Feed Rate {mL per Hour}: 30  Increase Tube Feed Rate by (mL): 10     Every 4 hours  Until Goal Tube Feed Rate (mL per Hour): 60  Tube Feed Duration (in Hours): 24  Tube Feed Start Time: 06:00  Serenity TF     Qty per Day:  1  Entered: Mar 26 2025  1:39PM

## 2025-03-27 NOTE — PROGRESS NOTE ADULT - PROBLEM SELECTOR PLAN 8
- SHAGUFTA on CKD stage 3 suspected in setting of new infection Covid 19   - CR peaked to 4.34 on 3/3  - Slight increase in Creatnine today; will Give on liter of LR   - Renvela d/cd Hyperphosphatemia resolved   - Continue Retacrit [id number] U tiw sq - SHAGUFTA on CKD stage 3 suspected in setting of new infection Covid 19   - CR peaked to 4.34 on 3/3  - Slight increase in Creatinine today; will Give on liter of LR   - Renvela d/cd Hyperphosphatemia resolved   - Continue Retacrit 33958 U tiw sq

## 2025-03-28 ENCOUNTER — TRANSCRIPTION ENCOUNTER (OUTPATIENT)
Age: 70
End: 2025-03-28

## 2025-03-28 VITALS
SYSTOLIC BLOOD PRESSURE: 126 MMHG | DIASTOLIC BLOOD PRESSURE: 72 MMHG | RESPIRATION RATE: 18 BRPM | OXYGEN SATURATION: 98 % | HEART RATE: 80 BPM | TEMPERATURE: 98 F

## 2025-03-28 PROCEDURE — 80076 HEPATIC FUNCTION PANEL: CPT

## 2025-03-28 PROCEDURE — 84550 ASSAY OF BLOOD/URIC ACID: CPT

## 2025-03-28 PROCEDURE — 81001 URINALYSIS AUTO W/SCOPE: CPT

## 2025-03-28 PROCEDURE — 36415 COLL VENOUS BLD VENIPUNCTURE: CPT

## 2025-03-28 PROCEDURE — 84132 ASSAY OF SERUM POTASSIUM: CPT

## 2025-03-28 PROCEDURE — 86880 COOMBS TEST DIRECT: CPT

## 2025-03-28 PROCEDURE — 84540 ASSAY OF URINE/UREA-N: CPT

## 2025-03-28 PROCEDURE — 83880 ASSAY OF NATRIURETIC PEPTIDE: CPT

## 2025-03-28 PROCEDURE — 85610 PROTHROMBIN TIME: CPT

## 2025-03-28 PROCEDURE — 96368 THER/DIAG CONCURRENT INF: CPT

## 2025-03-28 PROCEDURE — 85025 COMPLETE CBC W/AUTO DIFF WBC: CPT

## 2025-03-28 PROCEDURE — 87186 SC STD MICRODIL/AGAR DIL: CPT

## 2025-03-28 PROCEDURE — 97530 THERAPEUTIC ACTIVITIES: CPT

## 2025-03-28 PROCEDURE — 87070 CULTURE OTHR SPECIMN AEROBIC: CPT

## 2025-03-28 PROCEDURE — 80048 BASIC METABOLIC PNL TOTAL CA: CPT

## 2025-03-28 PROCEDURE — 82962 GLUCOSE BLOOD TEST: CPT

## 2025-03-28 PROCEDURE — 76770 US EXAM ABDO BACK WALL COMP: CPT

## 2025-03-28 PROCEDURE — 83605 ASSAY OF LACTIC ACID: CPT

## 2025-03-28 PROCEDURE — 92610 EVALUATE SWALLOWING FUNCTION: CPT

## 2025-03-28 PROCEDURE — 86860 RBC ANTIBODY ELUTION: CPT

## 2025-03-28 PROCEDURE — 86922 COMPATIBILITY TEST ANTIGLOB: CPT

## 2025-03-28 PROCEDURE — L8699: CPT

## 2025-03-28 PROCEDURE — 84295 ASSAY OF SERUM SODIUM: CPT

## 2025-03-28 PROCEDURE — 85379 FIBRIN DEGRADATION QUANT: CPT

## 2025-03-28 PROCEDURE — 95711 VEEG 2-12 HR UNMONITORED: CPT

## 2025-03-28 PROCEDURE — 87641 MR-STAPH DNA AMP PROBE: CPT

## 2025-03-28 PROCEDURE — P9040: CPT

## 2025-03-28 PROCEDURE — 94003 VENT MGMT INPAT SUBQ DAY: CPT

## 2025-03-28 PROCEDURE — 82728 ASSAY OF FERRITIN: CPT

## 2025-03-28 PROCEDURE — 84100 ASSAY OF PHOSPHORUS: CPT

## 2025-03-28 PROCEDURE — 93005 ELECTROCARDIOGRAM TRACING: CPT

## 2025-03-28 PROCEDURE — 82570 ASSAY OF URINE CREATININE: CPT

## 2025-03-28 PROCEDURE — 82565 ASSAY OF CREATININE: CPT

## 2025-03-28 PROCEDURE — 96365 THER/PROPH/DIAG IV INF INIT: CPT

## 2025-03-28 PROCEDURE — 95700 EEG CONT REC W/VID EEG TECH: CPT

## 2025-03-28 PROCEDURE — 74230 X-RAY XM SWLNG FUNCJ C+: CPT

## 2025-03-28 PROCEDURE — 87040 BLOOD CULTURE FOR BACTERIA: CPT

## 2025-03-28 PROCEDURE — 92612 ENDOSCOPY SWALLOW (FEES) VID: CPT

## 2025-03-28 PROCEDURE — 97162 PT EVAL MOD COMPLEX 30 MIN: CPT

## 2025-03-28 PROCEDURE — 92526 ORAL FUNCTION THERAPY: CPT

## 2025-03-28 PROCEDURE — 94002 VENT MGMT INPAT INIT DAY: CPT

## 2025-03-28 PROCEDURE — 99291 CRITICAL CARE FIRST HOUR: CPT

## 2025-03-28 PROCEDURE — 85018 HEMOGLOBIN: CPT

## 2025-03-28 PROCEDURE — 93308 TTE F-UP OR LMTD: CPT

## 2025-03-28 PROCEDURE — 92611 MOTION FLUOROSCOPY/SWALLOW: CPT

## 2025-03-28 PROCEDURE — 87899 AGENT NOS ASSAY W/OPTIC: CPT

## 2025-03-28 PROCEDURE — 82803 BLOOD GASES ANY COMBINATION: CPT

## 2025-03-28 PROCEDURE — 94640 AIRWAY INHALATION TREATMENT: CPT

## 2025-03-28 PROCEDURE — 87086 URINE CULTURE/COLONY COUNT: CPT

## 2025-03-28 PROCEDURE — 82550 ASSAY OF CK (CPK): CPT

## 2025-03-28 PROCEDURE — 82435 ASSAY OF BLOOD CHLORIDE: CPT

## 2025-03-28 PROCEDURE — 83735 ASSAY OF MAGNESIUM: CPT

## 2025-03-28 PROCEDURE — 87449 NOS EACH ORGANISM AG IA: CPT

## 2025-03-28 PROCEDURE — 95714 VEEG EA 12-26 HR UNMNTR: CPT

## 2025-03-28 PROCEDURE — 87637 SARSCOV2&INF A&B&RSV AMP PRB: CPT

## 2025-03-28 PROCEDURE — 97166 OT EVAL MOD COMPLEX 45 MIN: CPT

## 2025-03-28 PROCEDURE — 80053 COMPREHEN METABOLIC PANEL: CPT

## 2025-03-28 PROCEDURE — 86140 C-REACTIVE PROTEIN: CPT

## 2025-03-28 PROCEDURE — 86902 BLOOD TYPE ANTIGEN DONOR EA: CPT

## 2025-03-28 PROCEDURE — 36430 TRANSFUSION BLD/BLD COMPNT: CPT

## 2025-03-28 PROCEDURE — 86900 BLOOD TYPING SEROLOGIC ABO: CPT

## 2025-03-28 PROCEDURE — 86870 RBC ANTIBODY IDENTIFICATION: CPT

## 2025-03-28 PROCEDURE — 85730 THROMBOPLASTIN TIME PARTIAL: CPT

## 2025-03-28 PROCEDURE — 93970 EXTREMITY STUDY: CPT

## 2025-03-28 PROCEDURE — 71045 X-RAY EXAM CHEST 1 VIEW: CPT

## 2025-03-28 PROCEDURE — 71250 CT THORAX DX C-: CPT | Mod: MC

## 2025-03-28 PROCEDURE — 87640 STAPH A DNA AMP PROBE: CPT

## 2025-03-28 PROCEDURE — 82330 ASSAY OF CALCIUM: CPT

## 2025-03-28 PROCEDURE — C9254: CPT

## 2025-03-28 PROCEDURE — 87077 CULTURE AEROBIC IDENTIFY: CPT

## 2025-03-28 PROCEDURE — 96366 THER/PROPH/DIAG IV INF ADDON: CPT

## 2025-03-28 PROCEDURE — 82947 ASSAY GLUCOSE BLOOD QUANT: CPT

## 2025-03-28 PROCEDURE — 94660 CPAP INITIATION&MGMT: CPT

## 2025-03-28 PROCEDURE — 99233 SBSQ HOSP IP/OBS HIGH 50: CPT | Mod: FS

## 2025-03-28 PROCEDURE — 87205 SMEAR GRAM STAIN: CPT

## 2025-03-28 PROCEDURE — 70450 CT HEAD/BRAIN W/O DYE: CPT | Mod: MC

## 2025-03-28 PROCEDURE — 85027 COMPLETE CBC AUTOMATED: CPT

## 2025-03-28 PROCEDURE — 86850 RBC ANTIBODY SCREEN: CPT

## 2025-03-28 PROCEDURE — 97535 SELF CARE MNGMENT TRAINING: CPT

## 2025-03-28 PROCEDURE — 36600 WITHDRAWAL OF ARTERIAL BLOOD: CPT

## 2025-03-28 PROCEDURE — 83615 LACTATE (LD) (LDH) ENZYME: CPT

## 2025-03-28 PROCEDURE — P9045: CPT

## 2025-03-28 PROCEDURE — 85014 HEMATOCRIT: CPT

## 2025-03-28 PROCEDURE — 97110 THERAPEUTIC EXERCISES: CPT

## 2025-03-28 PROCEDURE — 84484 ASSAY OF TROPONIN QUANT: CPT

## 2025-03-28 PROCEDURE — L8501: CPT

## 2025-03-28 PROCEDURE — 92597 ORAL SPEECH DEVICE EVAL: CPT

## 2025-03-28 PROCEDURE — 86901 BLOOD TYPING SEROLOGIC RH(D): CPT

## 2025-03-28 RX ORDER — LAMOTRIGINE 150 MG/1
2 TABLET ORAL
Qty: 0 | Refills: 0 | DISCHARGE
Start: 2025-03-28

## 2025-03-28 RX ORDER — METOPROLOL SUCCINATE 50 MG/1
1 TABLET, EXTENDED RELEASE ORAL
Qty: 0 | Refills: 0 | DISCHARGE
Start: 2025-03-28

## 2025-03-28 RX ORDER — LAMOTRIGINE 150 MG/1
100 TABLET ORAL
Qty: 0 | Refills: 0 | DISCHARGE
Start: 2025-03-28

## 2025-03-28 RX ORDER — EPOETIN ALFA 10000 [IU]/ML
10000 SOLUTION INTRAVENOUS; SUBCUTANEOUS
Qty: 0 | Refills: 0 | DISCHARGE
Start: 2025-03-28

## 2025-03-28 RX ORDER — MELATONIN 5 MG
1 TABLET ORAL
Qty: 0 | Refills: 0 | DISCHARGE
Start: 2025-03-28

## 2025-03-28 RX ORDER — HYPROMELLOSE 0.4 %
1 DROPS OPHTHALMIC (EYE)
Qty: 0 | Refills: 0 | DISCHARGE
Start: 2025-03-28

## 2025-03-28 RX ORDER — APIXABAN 2.5 MG/1
1 TABLET, FILM COATED ORAL
Qty: 0 | Refills: 0 | DISCHARGE
Start: 2025-03-28

## 2025-03-28 RX ORDER — MAGNESIUM, ALUMINUM HYDROXIDE 200-200 MG
30 TABLET,CHEWABLE ORAL
Qty: 0 | Refills: 0 | DISCHARGE
Start: 2025-03-28

## 2025-03-28 RX ORDER — ACETAMINOPHEN 500 MG/5ML
2 LIQUID (ML) ORAL
Qty: 0 | Refills: 0 | DISCHARGE
Start: 2025-03-28

## 2025-03-28 RX ADMIN — IPRATROPIUM BROMIDE AND ALBUTEROL SULFATE 3 MILLILITER(S): .5; 2.5 SOLUTION RESPIRATORY (INHALATION) at 17:09

## 2025-03-28 RX ADMIN — LAMOTRIGINE 50 MILLIGRAM(S): 150 TABLET ORAL at 06:13

## 2025-03-28 RX ADMIN — IPRATROPIUM BROMIDE AND ALBUTEROL SULFATE 3 MILLILITER(S): .5; 2.5 SOLUTION RESPIRATORY (INHALATION) at 00:14

## 2025-03-28 RX ADMIN — APIXABAN 5 MILLIGRAM(S): 2.5 TABLET, FILM COATED ORAL at 17:01

## 2025-03-28 RX ADMIN — Medication 40 MILLIGRAM(S): at 12:54

## 2025-03-28 RX ADMIN — Medication 650 MILLIGRAM(S): at 18:01

## 2025-03-28 RX ADMIN — NYSTATIN 1 APPLICATION(S): 100000 CREAM TOPICAL at 00:36

## 2025-03-28 RX ADMIN — GABAPENTIN 150 MILLIGRAM(S): 400 CAPSULE ORAL at 17:00

## 2025-03-28 RX ADMIN — LACOSAMIDE 200 MILLIGRAM(S): 150 TABLET, FILM COATED ORAL at 06:12

## 2025-03-28 RX ADMIN — NYSTATIN 1 APPLICATION(S): 100000 CREAM TOPICAL at 06:13

## 2025-03-28 RX ADMIN — LEVETIRACETAM 750 MILLIGRAM(S): 10 INJECTION, SOLUTION INTRAVENOUS at 17:00

## 2025-03-28 RX ADMIN — IPRATROPIUM BROMIDE AND ALBUTEROL SULFATE 3 MILLILITER(S): .5; 2.5 SOLUTION RESPIRATORY (INHALATION) at 11:09

## 2025-03-28 RX ADMIN — IPRATROPIUM BROMIDE AND ALBUTEROL SULFATE 3 MILLILITER(S): .5; 2.5 SOLUTION RESPIRATORY (INHALATION) at 06:20

## 2025-03-28 RX ADMIN — GABAPENTIN 150 MILLIGRAM(S): 400 CAPSULE ORAL at 06:12

## 2025-03-28 RX ADMIN — METOPROLOL SUCCINATE 25 MILLIGRAM(S): 50 TABLET, EXTENDED RELEASE ORAL at 17:01

## 2025-03-28 RX ADMIN — Medication 1 APPLICATION(S): at 12:55

## 2025-03-28 RX ADMIN — ATORVASTATIN CALCIUM 20 MILLIGRAM(S): 80 TABLET, FILM COATED ORAL at 00:34

## 2025-03-28 RX ADMIN — ESCITALOPRAM OXALATE 15 MILLIGRAM(S): 20 TABLET ORAL at 06:13

## 2025-03-28 RX ADMIN — LEVETIRACETAM 750 MILLIGRAM(S): 10 INJECTION, SOLUTION INTRAVENOUS at 06:12

## 2025-03-28 RX ADMIN — LAMOTRIGINE 50 MILLIGRAM(S): 150 TABLET ORAL at 17:01

## 2025-03-28 RX ADMIN — NYSTATIN 1 APPLICATION(S): 100000 CREAM TOPICAL at 12:55

## 2025-03-28 RX ADMIN — LURASIDONE HYDROCHLORIDE 20 MILLIGRAM(S): 120 TABLET, FILM COATED ORAL at 00:35

## 2025-03-28 RX ADMIN — METOPROLOL SUCCINATE 25 MILLIGRAM(S): 50 TABLET, EXTENDED RELEASE ORAL at 06:13

## 2025-03-28 RX ADMIN — Medication 1 APPLICATION(S): at 18:09

## 2025-03-28 RX ADMIN — Medication 650 MILLIGRAM(S): at 17:01

## 2025-03-28 RX ADMIN — LACOSAMIDE 200 MILLIGRAM(S): 150 TABLET, FILM COATED ORAL at 17:00

## 2025-03-28 RX ADMIN — Medication 1000 UNIT(S): at 12:54

## 2025-03-28 RX ADMIN — APIXABAN 5 MILLIGRAM(S): 2.5 TABLET, FILM COATED ORAL at 06:12

## 2025-03-28 NOTE — PROGRESS NOTE ADULT - PROBLEM SELECTOR PROBLEM 5
Acute on chronic heart failure with preserved ejection fraction
Epilepsy
Epilepsy
Paroxysmal atrial fibrillation
Epilepsy
Chronic kidney disease (CKD)
Epilepsy
Acute on chronic heart failure with preserved ejection fraction
Epilepsy
Paroxysmal atrial fibrillation
Epilepsy
Acute on chronic heart failure with preserved ejection fraction
Epilepsy
Epilepsy
Paroxysmal atrial fibrillation
Acute on chronic heart failure with preserved ejection fraction
Epilepsy
Epilepsy
Acute on chronic heart failure with preserved ejection fraction
Acute on chronic heart failure with preserved ejection fraction
Epilepsy
Thrombocytopenia
Epilepsy
Acute on chronic heart failure with preserved ejection fraction
Epilepsy
Acute on chronic heart failure with preserved ejection fraction
Epilepsy
Epilepsy
Paroxysmal atrial fibrillation
Acute on chronic heart failure with preserved ejection fraction
Epilepsy
Acute on chronic heart failure with preserved ejection fraction
Paroxysmal atrial fibrillation
Acute on chronic heart failure with preserved ejection fraction

## 2025-03-28 NOTE — PROGRESS NOTE ADULT - PROVIDER SPECIALTY LIST ADULT
Cardiology
Heme/Onc
Infectious Disease
Internal Medicine
Internal Medicine
MICU
Nephrology
Pulmonology
Rehab Medicine
Cardiology
Heme/Onc
Infectious Disease
MICU
Nephrology
Pulmonology
Pulmonology
Cardiology
Heme/Onc
Infectious Disease
MICU
Nephrology
Pulmonology
Pulmonology
Gastroenterology
Heme/Onc
Infectious Disease
Internal Medicine
MICU
Nephrology
Pulmonology
MICU
Internal Medicine
Pulmonology
Internal Medicine
Internal Medicine
Pulmonology
Internal Medicine
Pulmonology
Internal Medicine
Internal Medicine
Pulmonology
Internal Medicine
Pulmonology
Internal Medicine
Internal Medicine
Pulmonology
Internal Medicine
Pulmonology
Internal Medicine
Pulmonology
Pulmonology

## 2025-03-28 NOTE — PROGRESS NOTE ADULT - SUBJECTIVE AND OBJECTIVE BOX
[facility] HEMATOLOGY/ONCOLOGY INPATIENT PROGRESS NOTE     Interval Hx:   03-28-25: Mr. Gr was seen at bedside today.    Meds:   MEDICATIONS  (STANDING):  albuterol/ipratropium for Nebulization 3 milliLiter(s) Nebulizer every 6 hours  apixaban 5 milliGRAM(s) Enteral Tube every 12 hours  atorvastatin 20 milliGRAM(s) Oral at bedtime  chlorhexidine 4% Liquid 1 Application(s) Topical daily  cholecalciferol 1000 Unit(s) Oral daily  epoetin krystyna-epbx (RETACRIT) Injectable 57282 Unit(s) SubCutaneous <User Schedule>  escitalopram 15 milliGRAM(s) Oral with breakfast  gabapentin Solution 150 milliGRAM(s) Oral every 12 hours  influenza  Vaccine (HIGH DOSE) 0.5 milliLiter(s) IntraMuscular once  lacosamide Solution 200 milliGRAM(s) Oral two times a day  lamoTRIgine 50 milliGRAM(s) Oral two times a day  lanolin Ointment 1 Application(s) Topical daily  levETIRAcetam  Solution 750 milliGRAM(s) Oral two times a day  lurasidone 20 milliGRAM(s) Oral at bedtime  metoprolol tartrate 25 milliGRAM(s) Oral every 12 hours  nystatin Powder 1 Application(s) Topical every 8 hours  pantoprazole   Suspension 40 milliGRAM(s) Oral daily    MEDICATIONS  (PRN):  acetaminophen     Tablet .. 650 milliGRAM(s) Oral every 6 hours PRN Temp greater or equal to 38C (100.4F), Mild Pain (1 - 3)  aluminum hydroxide/magnesium hydroxide/simethicone Suspension 30 milliLiter(s) Oral every 4 hours PRN Dyspepsia  artificial tears (preservative free) Ophthalmic Solution 1 Drop(s) Both EYES every 6 hours PRN Dry Eyes  melatonin 3 milliGRAM(s) Oral at bedtime PRN Insomnia  ondansetron Injectable 4 milliGRAM(s) IV Push every 8 hours PRN Nausea and/or Vomiting    Vital Signs Last 24 Hrs  T(C): 37 (28 Mar 2025 00:10), Max: 37.2 (27 Mar 2025 20:15)  T(F): 98.6 (28 Mar 2025 00:10), Max: 98.9 (27 Mar 2025 20:15)  HR: 74 (28 Mar 2025 00:29) (69 - 90)  BP: 150/81 (28 Mar 2025 00:10) (110/71 - 150/81)  BP(mean): --  RR: 18 (28 Mar 2025 00:10) (18 - 18)  SpO2: 97% (28 Mar 2025 00:29) (95% - 99%)    Parameters below as of 28 Mar 2025 00:10  Patient On (Oxygen Delivery Method): tracheostomy collar    Physical Exam:  Gen: NAD, tracheostomy with ETT  HEENT: EOMI, MMM  Chest: equal chest rise  Cardiac: regular   Abd: non distended    Labs:                        9.4    8.54  )-----------( 196      ( 26 Mar 2025 07:11 )             30.3     CBC Full  -  ( 26 Mar 2025 07:11 )  WBC Count : 8.54 K/uL  RBC Count : 2.98 M/uL  Hemoglobin : 9.4 g/dL  Hematocrit : 30.3 %  Platelet Count - Automated : 196 K/uL  Mean Cell Volume : 101.7 fl  Mean Cell Hemoglobin : 31.5 pg  Mean Cell Hemoglobin Concentration : 31.0 g/dL    03-27    138  |  100  |  29[H]  ----------------------------<  124[H]  4.9   |  25  |  1.64[H]    Ca    9.1      27 Mar 2025 06:41  Phos  3.4     03-26  Mg     2.1     03-26         [facility] HEMATOLOGY/ONCOLOGY INPATIENT PROGRESS NOTE     Interval Hx:   03-28-25: Mr. Gr was seen at bedside today, without significant overnight events, continued to be monitored closely, renal function stable. On Eliquis 5mg BID and Epo. No signs of bleeding. Continues with tracheostomy     Meds:   MEDICATIONS  (STANDING):  albuterol/ipratropium for Nebulization 3 milliLiter(s) Nebulizer every 6 hours  apixaban 5 milliGRAM(s) Enteral Tube every 12 hours  atorvastatin 20 milliGRAM(s) Oral at bedtime  chlorhexidine 4% Liquid 1 Application(s) Topical daily  cholecalciferol 1000 Unit(s) Oral daily  epoetin krystyna-epbx (RETACRIT) Injectable 94557 Unit(s) SubCutaneous <User Schedule>  escitalopram 15 milliGRAM(s) Oral with breakfast  gabapentin Solution 150 milliGRAM(s) Oral every 12 hours  influenza  Vaccine (HIGH DOSE) 0.5 milliLiter(s) IntraMuscular once  lacosamide Solution 200 milliGRAM(s) Oral two times a day  lamoTRIgine 50 milliGRAM(s) Oral two times a day  lanolin Ointment 1 Application(s) Topical daily  levETIRAcetam  Solution 750 milliGRAM(s) Oral two times a day  lurasidone 20 milliGRAM(s) Oral at bedtime  metoprolol tartrate 25 milliGRAM(s) Oral every 12 hours  nystatin Powder 1 Application(s) Topical every 8 hours  pantoprazole   Suspension 40 milliGRAM(s) Oral daily    MEDICATIONS  (PRN):  acetaminophen     Tablet .. 650 milliGRAM(s) Oral every 6 hours PRN Temp greater or equal to 38C (100.4F), Mild Pain (1 - 3)  aluminum hydroxide/magnesium hydroxide/simethicone Suspension 30 milliLiter(s) Oral every 4 hours PRN Dyspepsia  artificial tears (preservative free) Ophthalmic Solution 1 Drop(s) Both EYES every 6 hours PRN Dry Eyes  melatonin 3 milliGRAM(s) Oral at bedtime PRN Insomnia  ondansetron Injectable 4 milliGRAM(s) IV Push every 8 hours PRN Nausea and/or Vomiting    Vital Signs Last 24 Hrs  T(C): 37 (28 Mar 2025 00:10), Max: 37.2 (27 Mar 2025 20:15)  T(F): 98.6 (28 Mar 2025 00:10), Max: 98.9 (27 Mar 2025 20:15)  HR: 74 (28 Mar 2025 00:29) (69 - 90)  BP: 150/81 (28 Mar 2025 00:10) (110/71 - 150/81)  BP(mean): --  RR: 18 (28 Mar 2025 00:10) (18 - 18)  SpO2: 97% (28 Mar 2025 00:29) (95% - 99%)    Parameters below as of 28 Mar 2025 00:10  Patient On (Oxygen Delivery Method): tracheostomy collar    Physical Exam:  Gen: NAD, tracheostomy with ETT  HEENT: EOMI, MMM  Chest: equal chest rise  Cardiac: regular   Abd: non distended    Labs:                        9.4    8.54  )-----------( 196      ( 26 Mar 2025 07:11 )             30.3     CBC Full  -  ( 26 Mar 2025 07:11 )  WBC Count : 8.54 K/uL  RBC Count : 2.98 M/uL  Hemoglobin : 9.4 g/dL  Hematocrit : 30.3 %  Platelet Count - Automated : 196 K/uL  Mean Cell Volume : 101.7 fl  Mean Cell Hemoglobin : 31.5 pg  Mean Cell Hemoglobin Concentration : 31.0 g/dL    03-27    138  |  100  |  29[H]  ----------------------------<  124[H]  4.9   |  25  |  1.64[H]    Ca    9.1      27 Mar 2025 06:41  Phos  3.4     03-26  Mg     2.1     03-26

## 2025-03-28 NOTE — PROGRESS NOTE ADULT - NS ATTEND AMEND GEN_ALL_CORE FT
69M with a h/o metastatic testicular cancer, epilepsy on AEDs, chronic respiratory failure with hypoxia and hypercapnia on home O2, VAZQUEZ on CPAP, HFpEF admitted for acute on chronic respiratory failure with hypoxia and hypercapnia likely secondary to combination of COVID PNA, bacterial superinfection, and acute pulmonary edema due to acute on chronic HFpEF. Hypotension likely severe sepsis with septic shock. SHAGUFTA likely ATN +/- venous congestion. Repeat CT chest with bilateral GGOs possibly acute pulmonary edema vs early fibrosis vs residual COVID along with L basilar consolidation likely bacterial PNA vs atelectasis. Now s/p trach and PEG.    # Acute on chronic respiratory failure with hypoxia and hypercapnia  # COVID PNA  # Bacterial PNA  # Acute pulmonary edema  # Acute on chronic HFpEF  # Hypotension  # Severe sepsis with septic shock  # SHAGUFTA  # Hypernatremia  # Epilepsy  Clinically much improved, interactive and phonating  - Continue AEDs  - Tolerating TC and PMV   FEN- Trend Cr, avoid nephrotoxins, - Hypernatremia resolved. Mild SHAGUFTA today  ID- Completed Levaquin course. Completed course of dexamethasone and remdesivir for COVID  GI- c/w PEG feeds, e/o aspiration on FEES testing on 3/18. Tube feeds changed to vital due to diarrhea. Passed MBS however episode of desaturation while eating yesterday. repeat feeding in upright position without any issues.  CV- Afib- rate controlled, tolerating Eliquis. TTE without evidence of LA thrombus, unable to assess Left atrial appendage  -OOB to chair, PT and OT follow up appreciated.   - Full code.  - potential dc to acute rehab today.

## 2025-03-28 NOTE — PROGRESS NOTE ADULT - NS ATTEND [facility] GEN_ALL_CORE
History/Exam/Medical decision making
History/Medical decision making
History/Exam/Medical decision making
History/Exam/Medical decision making
History/Medical decision making
History/Medical decision making
History/Exam/Medical decision making
Medical decision making
History/Exam/Medical decision making
Medical decision making
History/Exam/Medical decision making

## 2025-03-28 NOTE — PROGRESS NOTE ADULT - PROBLEM SELECTOR PLAN 8
- SHAGUFTA on CKD stage 3 suspected in setting of new infection Covid 19   - CR peaked to 4.34 on 3/3  - Slight increase in Creatinine today; will Give on liter of LR   - Renvela d/cd Hyperphosphatemia resolved   - Continue Retacrit [id number] U tiw sq

## 2025-03-28 NOTE — PROGRESS NOTE ADULT - PROBLEM SELECTOR PROBLEM 1
Acute on chronic respiratory failure with hypoxia

## 2025-03-28 NOTE — PROGRESS NOTE ADULT - NUTRITIONAL ASSESSMENT
This patient has been assessed with a concern for Malnutrition and has been determined to have a diagnosis/diagnoses of Severe protein-calorie malnutrition.    This patient is being managed with:   Diet Regular-  Moderately Thick Liquids (MODTHICKLIQS)  Entered: Mar 27 2025  1:57PM

## 2025-03-28 NOTE — PROGRESS NOTE ADULT - REASON FOR ADMISSION
Acute on chronic hypoxemic respiratory failure

## 2025-03-28 NOTE — PROGRESS NOTE ADULT - PROBLEM SELECTOR PROBLEM 2
Testicular cancer
2019 novel coronavirus disease (COVID-19)
Testicular cancer
Testicular cancer
2019 novel coronavirus disease (COVID-19)
Testicular cancer
2019 novel coronavirus disease (COVID-19)
Testicular cancer
Testicular cancer
2019 novel coronavirus disease (COVID-19)
Testicular cancer
2019 novel coronavirus disease (COVID-19)
Testicular cancer
2019 novel coronavirus disease (COVID-19)
Testicular cancer
2019 novel coronavirus disease (COVID-19)
Testicular cancer
2019 novel coronavirus disease (COVID-19)
2019 novel coronavirus disease (COVID-19)
Testicular cancer
2019 novel coronavirus disease (COVID-19)
Testicular cancer

## 2025-03-28 NOTE — DISCHARGE NOTE NURSING/CASE MANAGEMENT/SOCIAL WORK - NSDCPEFALRISK_GEN_ALL_CORE
For information on Fall & Injury Prevention, visit: [email or web address] OR  [email or web address] OR  https://www.cdc.gov/steadi/patient.html

## 2025-03-28 NOTE — PROGRESS NOTE ADULT - TIME BILLING
Medical management as above, reviewing chart and coordinating care with primary team/staff, as well as reviewing vitals, radiology, medication list, recent labs, and prior records.    Does not include teaching time.
chart and data review, clinical assessment, and coordination of care. This excludes any time spent on separate procedures or teaching.
Medical management as above, reviewing chart and coordinating care with primary team/staff, as well as reviewing vitals, radiology, medication list, recent labs, and prior records.    Does not include teaching time.

## 2025-03-28 NOTE — PROGRESS NOTE ADULT - ASSESSMENT
69 year old male with a past medical history of hypertension, hyperlipemia VAZQUEZ (on CPAP at bedtime, NC 2 liters during the day), CKD stage 3, epilepsy, schizophrenia, developmental delay, metastatic testicular cancer (s/p orchiectomy, actively on chemotherapy, last dose of etoposide/cisplatin on 6/2024), presenting with acute on chronic hypoxemic respiratory failure, secondary to (a) COVID-19 infection, (b) superimposed pneumonia after recent influenza infection,  Transferred to MICU on 2/23 after RRT on floors due to hypoxia even with max setting AVAPS. Patient started on midodrine and weaned off of levo on 2/24. Failed pressure support on 2/25 and started diuresis with Bumex. CTH was unremarkable and weaned off of Precedex on 3/1. On 3/4 patient extubated and GOC conversations with family revealed that they would want patient to be trached. On 3/7, patient found to have RLL consolidation on POCUS and started on Zosyn, intubated on 3/8 due to increased lethargy and inability to clear oral secretions. Trach placed on 3/12. Transferred to [facility] on 3/13. S/p Peg 3/14.  EGD with normal findings. Tolerating TC ATC since 3/16.   TTE performed 3/18 no obvious sign of left atrial appendage. Recommended by Cards and Cleared by heme for Eliquis 5mg BID. H/H stable. Tolerating PMV trials. Failed FEES 3/18. Trach downsized to a 7 Cuffless Portex on 3/20; Fio2 at 28%. MBS       3/27: Passed MBS and diet advanced to regular w/ thickened liquids, had brief hypoxemia episode after eating mashed potatoes. O2 sats improved with suctioning. Today patient seen by speech who recc to remain on recc diet and complete upright positioning - ideally OOB to chair; otherwise, can utilize bed in chair position/ elevate HOB.  DCP tomorrow to [facility] pending bed availability  69 year old male with a past medical history of hypertension, hyperlipemia VAZQUEZ (on CPAP at bedtime, NC 2 liters during the day), CKD stage 3, epilepsy, schizophrenia, developmental delay, metastatic testicular cancer (s/p orchiectomy, actively on chemotherapy, last dose of etoposide/cisplatin on 6/2024), presenting with acute on chronic hypoxemic respiratory failure, secondary to (a) COVID-19 infection, (b) superimposed pneumonia after recent influenza infection,  Transferred to MICU on 2/23 after RRT on floors due to hypoxia even with max setting AVAPS. Patient started on midodrine and weaned off of levo on 2/24. Failed pressure support on 2/25 and started diuresis with Bumex. CTH was unremarkable and weaned off of Precedex on 3/1. On 3/4 patient extubated and GOC conversations with family revealed that they would want patient to be trached. On 3/7, patient found to have RLL consolidation on POCUS and started on Zosyn, intubated on 3/8 due to increased lethargy and inability to clear oral secretions. Trach placed on 3/12. Transferred to RCU on 3/13. S/p Peg 3/14.  EGD with normal findings. Tolerating TC ATC since 3/16.   TTE performed 3/18 no obvious sign of left atrial appendage. Recommended by Cards and Cleared by heme for Eliquis 5mg BID. H/H stable. Tolerating PMV trials. Failed FEES 3/18. Trach downsized to a 7 Cuffless Portex on 3/20; Fio2 at 28%. MBS       3/28: Doing well since Passed MBS and diet advanced to regular w/ thickened liquids. Remains medically stable and cleared for transfer to [facility].

## 2025-03-28 NOTE — PROGRESS NOTE ADULT - SUBJECTIVE AND OBJECTIVE BOX
DATE OF SERVICE: 03-28-25 @ 13:39    Patient is a 69y old  Male who presents with a chief complaint of Acute on chronic hypoxemic respiratory failure (28 Mar 2025 10:50)      INTERVAL HISTORY: Feels ok.     REVIEW OF SYSTEMS:  CONSTITUTIONAL: No weakness  EYES/ENT: No visual changes;  No throat pain   NECK: No pain or stiffness  RESPIRATORY: No cough, wheezing; No shortness of breath  CARDIOVASCULAR: No chest pain or palpitations  GASTROINTESTINAL: No abdominal  pain. No nausea, vomiting, or hematemesis  GENITOURINARY: No dysuria, frequency or hematuria  NEUROLOGICAL: No stroke like symptoms  SKIN: No rashes    	  MEDICATIONS:  metoprolol tartrate 25 milliGRAM(s) Oral every 12 hours        PHYSICAL EXAM:  T(C): 36.9 (03-28-25 @ 12:35), Max: 37.2 (03-27-25 @ 20:15)  HR: 73 (03-28-25 @ 12:35) (70 - 90)  BP: 118/72 (03-28-25 @ 12:35) (118/72 - 150/81)  RR: 18 (03-28-25 @ 12:35) (18 - 18)  SpO2: 100% (03-28-25 @ 12:35) (95% - 100%)  Wt(kg): --  I&O's Summary    27 Mar 2025 07:01  -  28 Mar 2025 07:00  --------------------------------------------------------  IN: 100 mL / OUT: 350 mL / NET: -250 mL          Appearance: In no distress	  HEENT:    PERRL, EOMI	  Cardiovascular:  S1 S2, No JVD  Respiratory: Lungs clear to auscultation	  Gastrointestinal:  Soft, Non-tender, + BS	  Vascularature:  No edema of LE  Psychiatric: Appropriate affect   Neuro: no acute focal deficits           03-27    138  |  100  |  29[H]  ----------------------------<  124[H]  4.9   |  25  |  1.64[H]    Ca    9.1      27 Mar 2025 06:41          Labs personally reviewed      ASSESSMENT/PLAN: 	    69 year old male with a past medical history of hypertension, hyperlipemia VAZQUEZ (on CPAP at bedtime, NC 2 liters during the day), CKD stage 3, epilepsy, schizophrenia, developmental delay, metastatic testicular cancer (s/p orchiectomy, actively on chemotherapy, last dose of etoposide/cisplatin on 6/2024), presenting with acute on chronic hypoxemic respiratory failure, secondary to (a) COVID-19 infection, (b) superimposed pneumonia after recent influenza infection, and/or (c) fluid overload.     Problem/Plan - 1:  ·  Problem: Acute on chronic respiratory failure with hypoxia.   ·  Plan: Likely 2/2 PNA, HF  - Appreciate pulmonology.  - s/p trach 3/12  - Transferred to RCU    Problem/Plan - 2:  ·  Problem: SHAGUFTA (acute kidney injury).   ·  Plan: Has a history of CKD stage 3, Cr 2.38 at baseline.    - Nephrology following  - BUN now improved following de-escalation of diuretics    Problem/Plan - 3:  ·  Problem: Chronic heart failure with preserved ejection fraction.   ·  Plan: TTE done here 1/17/25 technically difficult, but LV systolic fxn appears nl without any regional wall motion abnormalities  - Euvolemic, repeat BNP. On 2/23 2300  - TTE 3/17 1. No obvious Left atrial thrombus; unable to evaluate left atrial appendage on TTE 2. Compared to the transthoracic echocardiogram performed on 2/23/2025, LA difficult to compare.    Problem/Plan - 4:  ·  Problem: New onset Afib RVR (on tele, confirmed with EKG 2/19)   ·  Plan:  - Cont Metoprolol 25mg BID  - Cont Eliquis 5mg BID      Problem/Plan - 5:  ·  Problem: HTN    ·  Plan: Resolved  - 3/19 slightly above target. Will cont to monitor  - 3/24 improved        Magaly Laird, REILYL-NP   Gus Andrew DO [facility]  Cardiovascular Medicine  [address], [address]  Available through call or text on [employer]  Office: [phone]

## 2025-03-28 NOTE — PROGRESS NOTE ADULT - PROBLEM SELECTOR PROBLEM 3
Metastatic cancer to brain
Pneumonia
SHAGUFTA (acute kidney injury)
Metastatic cancer to brain
Pneumonia
SHAGUFTA (acute kidney injury)
1000
Pneumonia
SHAGUFTA (acute kidney injury)
SHAGUFTA (acute kidney injury)
Epilepsy
Pneumonia
SHAGUFTA (acute kidney injury)
Epilepsy
Pneumonia
SHAGUFTA (acute kidney injury)
Pneumonia
SHAGUFTA (acute kidney injury)
SHAGUFTA (acute kidney injury)
Epilepsy
SHAGUFTA (acute kidney injury)
SHAGUFTA (acute kidney injury)
Metastatic cancer to brain
Pneumonia
SHAGUFTA (acute kidney injury)
Pneumonia
SHAGUFTA (acute kidney injury)
Epilepsy
Pneumonia
SHAGUFTA (acute kidney injury)
Pneumonia
Pneumonia
SHAGUFTA (acute kidney injury)
Pneumonia
SHAGUFTA (acute kidney injury)
Epilepsy
SHAGUFTA (acute kidney injury)
Pneumonia
SHAGUFTA (acute kidney injury)
Epilepsy
Epilepsy
Pneumonia
SHAGUFTA (acute kidney injury)
Epilepsy
Pneumonia
Pneumonia
Metastatic cancer to brain
SHAGUFTA (acute kidney injury)
SHAGUFTA (acute kidney injury)
Metastatic cancer to brain
Epilepsy

## 2025-03-28 NOTE — PROGRESS NOTE ADULT - ASSESSMENT
Mr. Gr is a 69 year old male with PMHx of seizure disorder, sleep apnea, HTN, chronic idiopathic constipation, stage III CKD, severe obesity, secondary hyperparathyroidism, anemia, thrombocytopenia, hyperlipidemia, testicular cancer under treatment with Dr. Ovalles who is admitted for acute hypoxic respiratory failure secondary to COVID vs superimposed pneumonia with new onset thrombocytopenia. He was recently discharged 02/06/2025 after a tenous stay including intubation in the ICU for respiratory failure in setting of seizure. He had recovered and was discharged to rehab.      Former smoker, quit 14y prior, denied ETOH, drug use. Lives at home. Does not have children. Denies family history of blood disorders or malignancy.  Denies previous workplace related exposures.  Denies previous history of blood transfusions.  Denied history of thromboses.  Denied history of  requiring anticoagulation.     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  Margins were negative.  pT3Nx. CT C/A/P with few left para-aortic and left iliac chain lymph nodes largest measuring 8.1 cm left para-aortic node, bilateral subcentimeter noncalcified pulmonary nodules measuring up to 7 mm. AFP, LDH, hCG were all elevated. 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 Started on adjuvant therapy with Cisplatin+Etoposide, so far completed 4 cycles of treatment with cisplatin dose reduced 50% and Etoposide 50% due to thrombocytopenia.      02/19/2025: on HFNC, noted tachycardia and fever, Klebsiella in urine as well, thrombocytopenia stable/improving, no signs of active bleeding     02/20/2025: developed A.fib with RVR and was placed on heparin drip and pending cardiology eval    02/21/2025: awake, on AVAPS overnight, noted RRT for hypoxia as pt removed HFNC at some point in time, good response thereafter     02/22/2025:  continues on AVAPS this morning, noted RRT overnight for hypoxia, hypercapnia, ICU following, US LE negative for DVT, counts overall stable/improved     02/23/2025: progressive respiratory failure, noted ongoing RTT this morning with pending intubation and transfer to MICU     02/24/2025: intubated 02/23/2025, sedated, on pressor support, no signs of bleeding, counts noted, no signs of bleeding     02/25/2025: continues in MICU, intubated and sedated, no signs of bleeding    02/26/2025: continues in MICU, intubated, without signs of bleeding, continues on heparin drip     02/27/2025:  remains under the care of the ICU, intubated, no signs of bleeding and continues on heparin drip, counts stable, has not required PRBC support this admission    02/28/2025: continues under the care of MICU, continues intubated, no signs of bleeding, on heparin drip    03/01/2025: continues in MICU, intubated, direct josefina IgG positive, eluate negative, not likely to be clinically significant     03/02/2025:  continues in MICU, nursing staff at bedside, no overnight events noted. CTH without acute intracranial pathology     03/03/2025: continues in MICU, intubated, on heparin drip, 1u PRBC overnight, no signs of bleeding, platelet count stable     03/04/2025: awake, moving arms spontaneously on exam, continues without much interaction however. No signs of bleeding, continues heparin drip, continues intubated in MICU     03/05/2025: extubated 03/04/2025, continues in MICU, awake and alert this morning and able to speak full sentences, discussed/reviewed findings, continues on heparin drip     03/06/2025: nursing staff at bedside, bipap overnight, without signs of bleeding, counts noted stable, renal function improving     03/07/2025:  no signs of bleeding, counts noted, continues heparin drip, continues in MICU    03/08/2025: on HFNC, no signs of bleeding, although alert and answering questions, not back to his baseline yet, continues in MICU    03/09/2025: continues in MICU, s/p re-intubation 03/08/2025 given respiratory compromise in the setting of copious secretions as evidenced by bronchoscopy, in setting of fever, now sedated, mild crusting of blood around mouth, without active sign of bleeding, counts noted reviewed, continues on heparin drip     03/10/2025: continues in MICU, no signs of bleeding, discussed with nursing staff at bedside, receiving 1u PRBC due to H/H downtrend, sputum culture with Pseudomonas, ICU and ID recs noted, continues to be intubated     03/11/2025: continues in MICU, intubated and sedated, noted counts, without signs of bleeding, appropriate response to transfusion    03/12/2025: continues in MICU, intubated and sedated, without signs of bleeding     03/13/2025: continues in MICU, s/p Tracheostomy 03/12/2025 with tracheal intubation, continues with sedation     03/14/2025:  awake and alert, mental status much improved, transferred from MICU to [address] [place] [address] 03/13/2025. Without significant events overnight, discussed with nursing staff at bedside, stated pt did well overnight, no signs of bleeding, no fever noted, medications provided via NGT and he is pending PEG-Tube placement today. His case is complex, noted with repeat need for intubations, discussed with pt, he is aware. Discussed with pts primary Oncologist as well.    03/15/2025: awake and alert, no overnight events noted, discussed with nursing staff, he did well overnight, no fevers, no signs of bleeding endorsed. He nods to information. Counts reviewed overall stable, discussed with him as well. Had PEG placed 03/14/2025 03/16/2025: no overnight events noted, comfortable overnight, nursing staff at bedside, stated pt did well, no signs of bleeding, had a BM. No fever reported. Counts reviewed, stable at this time     03/17/2025: no overnight events noted however continues with need for suctioning due to increase secretions, discussed with overnight staff, mild bleeding likely due to trauma from suctioning and recent tracheostomy placement without evidence of active bleeding otherwise. No fevers noted overnight. Had multiple smaller BMs, without evidence of bleeding, Nephrology recs and reviewed noted for Epo    03/18/2025: without significant overnight events, discussed with team at bedside, no evidence of bleeding, noted cardiology recs to start Eliquis 5mg BID for A.fib, currently on Lovenox, previously did require PRBC this admission but has since been stable, renal function seems to be improving, and Plt count improved, ok for AC per cardiology recs with Eliquis, monitor H/H closely     03/19/2025: comfortable, no overnight events noted, discussed with team, had 1 BM overnight without evidence of bleeding, now on Eliquis 5mg BID per cardiology recs for A.fib, renal function stable. Oxygen requirements stable overnight. No fevers reported. Pts primary Oncologist, Dr. Ovalles, aware, case discussed     03/20/2025: without significant events noted, continues on Eliquis, therapeutic, without signs of bleeding, continues on Epo, counts and imaging reviewed, no overnight events noted, continues with tracheostomy without increased oxygen requirements, increased secretions noted     03/21/2025: without significant overnight events noted. Discussed with overnight team, without evidence of bleeding, fever. Had a BM overnight, normal. Without increasing oxygen requirements, Without seizure like activity given hx of malignancy and cerebellar finding previously. Continues on Eliquis. Counts noted and reviewed, anemia, labile thrombocytopenia     03/22/2025:  no overnight events noted, discussed with overnight staff, pt did well, without increased oxygen requirements, without signs of bleeding. Had one BM overnight, without bleeding. Continues on Eliquis, Renal function is stable and appropriate. Counts and labs reviewed, overall stable however anemia persists.     03/23/2025: no significant overnight events noted, discussed with overnight team, without signs of bleeding. Oxygen requirements stable, pulmonary recs noted. Labs and course reviewed     03/24/2025:  comfortable, without significant overnight events noted, continues with Trach, without increased oxygen requirements, discussed with overnight team, had 1 BM overnight. No signs of bleeding noted, Counts and labs reviewed overall improving/stable. On exam without signs of bleeding, continues on Eliquis 5mg BID     03/25/2025: , comfortable this morning, without significant overnight events noted, without signs of bleeding, continues with tracheostomy, stable oxygen requirements. Counts, labs reviewed. Continues on Eliquis 5mg BID and TROY     03/26/2025: without signs of bleeding, counts and labs reviewed, case discussed, continues on Eliquis 5mg BID     03/27/2025: yesterday with coughing event with eating, overnight without changes in exam, no signs of bleeding, noted, counts and labs reviewed, stable/improving overall         #Acute hypoxic respiratory failure, Acute, Severe   # COVID  - CT noted with bilateral GGO  - ID following  - Pulmonary following  - Antibiotics per primary team/MICU and ID   - Intubated 02/23/2025 AM, MICU   - extubated 03/04/2025  - Pseudomonas from sputum culture   - Re-intubated 03/09/2025 due to increased work of breathing in setting of increased secretions, not protecting airway, tachypnea, hypoxia   - s/p Tracheostomy 03/12/2025    # Thrombocytopenia, Acute, Moderate   - Did occur during most recent admission and improved to normal prior to discharge   - Without signs of bleeding  - Could be multifactorial, possibly due to infection   - Continue to trend  - Transfuse to maintain Plt > 10k unless actively bleeding then maintain > 50k     # Brain lesion Hx of cancer, Acute, Severe   - pt with hx of seizures  - MRI brain now with 5.4 mm enhancing lesion in the LEFT middle cerebellar peduncle with associated edema suspicious for metastases  - MRI Lumbar negative for acute disease  - Small lesion without mass effect or edema, recommended to correlate per neurology if foci and locus are concordant with seizure activity  - Neurology recs noted, new lesion not likely to cause seizure  - It is rare, but nonetheless not impossible, for testicular cancer to be metastatic to the brain  - He will need another PET-CT, can be completed outpatient once stable if concern can proceed with biopsy at that time   - Previous endocrinology eval for thyroid nodule noted  - AFP/[facility] normal,  previously   - Repeat CTH without acute intracranial pathology     # 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  - Noted Anti E, Anti-K Anti-C antibodies previously  - direct josefina IgG positive, eluate negative, not likely to be clinically significant   - s/p 2u PRBC 03/03/2025 and 03/10/2025   - Monitor for bleeding  - Epo ordered  - Recommend to transfuse to maintain Hb > 7    # Klebsiella UTI, Acute, Severe > Moderate   # Pseudomonas UTI, Acute, Moderate   -Antibiotics per ID and primary team       Recommendations  - Trend CBC daily  - Transfuse to maintain Hb > 7   - Transfuse to maintain Plt >10k unless active bleeding then >50k   - Cardiology recs noted, now on Eliquis 5mg BID for A.fib, monitor H/H         Thank you for allowing me to participate in the care of Mr. Gr please do not hesitate to call or text me if you have further questions or concerns.     Brayan Mart MD  [facility] [place]   Division of Hematology/Oncology  [facility] [facility], [address]  [place] [address]  P: [phone]  F: [phone]    Attestation:    ----Pt evaluated, >50min spent including face-to-face interaction in addition to chart review, reviewing treatment plan, discussing with care staff in hospital, his outside physician, and managing, reviewing orders, labs and additional time spent documenting, in addition to the patient’s chronic diagnoses as listed in the assessment----        Mr. Gr is a 69 year old male with PMHx of seizure disorder, sleep apnea, HTN, chronic idiopathic constipation, stage III CKD, severe obesity, secondary hyperparathyroidism, anemia, thrombocytopenia, hyperlipidemia, testicular cancer under treatment with Dr. Ovalles who is admitted for acute hypoxic respiratory failure secondary to COVID vs superimposed pneumonia with new onset thrombocytopenia. He was recently discharged 02/06/2025 after a tenous stay including intubation in the ICU for respiratory failure in setting of seizure. He had recovered and was discharged to rehab.      Former smoker, quit 14y prior, denied ETOH, drug use. Lives at home. Does not have children. Denies family history of blood disorders or malignancy.  Denies previous workplace related exposures.  Denies previous history of blood transfusions.  Denied history of thromboses.  Denied history of  requiring anticoagulation.     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  Margins were negative.  pT3Nx. CT C/A/P with few left para-aortic and left iliac chain lymph nodes largest measuring 8.1 cm left para-aortic node, bilateral subcentimeter noncalcified pulmonary nodules measuring up to 7 mm. AFP, LDH, hCG were all elevated. 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 Started on adjuvant therapy with Cisplatin+Etoposide, so far completed 4 cycles of treatment with cisplatin dose reduced 50% and Etoposide 50% due to thrombocytopenia.      02/19/2025: on HFNC, noted tachycardia and fever, Klebsiella in urine as well, thrombocytopenia stable/improving, no signs of active bleeding     02/20/2025: developed A.fib with RVR and was placed on heparin drip and pending cardiology eval    02/21/2025: awake, on AVAPS overnight, noted RRT for hypoxia as pt removed HFNC at some point in time, good response thereafter     02/22/2025:  continues on AVAPS this morning, noted RRT overnight for hypoxia, hypercapnia, ICU following, US LE negative for DVT, counts overall stable/improved     02/23/2025: progressive respiratory failure, noted ongoing RTT this morning with pending intubation and transfer to MICU     02/24/2025: intubated 02/23/2025, sedated, on pressor support, no signs of bleeding, counts noted, no signs of bleeding     02/25/2025: continues in MICU, intubated and sedated, no signs of bleeding    02/26/2025: continues in MICU, intubated, without signs of bleeding, continues on heparin drip     02/27/2025:  remains under the care of the ICU, intubated, no signs of bleeding and continues on heparin drip, counts stable, has not required PRBC support this admission    02/28/2025: continues under the care of MICU, continues intubated, no signs of bleeding, on heparin drip    03/01/2025: continues in MICU, intubated, direct josefina IgG positive, eluate negative, not likely to be clinically significant     03/02/2025:  continues in MICU, nursing staff at bedside, no overnight events noted. CTH without acute intracranial pathology     03/03/2025: continues in MICU, intubated, on heparin drip, 1u PRBC overnight, no signs of bleeding, platelet count stable     03/04/2025: awake, moving arms spontaneously on exam, continues without much interaction however. No signs of bleeding, continues heparin drip, continues intubated in MICU     03/05/2025: extubated 03/04/2025, continues in MICU, awake and alert this morning and able to speak full sentences, discussed/reviewed findings, continues on heparin drip     03/06/2025: nursing staff at bedside, bipap overnight, without signs of bleeding, counts noted stable, renal function improving     03/07/2025:  no signs of bleeding, counts noted, continues heparin drip, continues in MICU    03/08/2025: on HFNC, no signs of bleeding, although alert and answering questions, not back to his baseline yet, continues in MICU    03/09/2025: continues in MICU, s/p re-intubation 03/08/2025 given respiratory compromise in the setting of copious secretions as evidenced by bronchoscopy, in setting of fever, now sedated, mild crusting of blood around mouth, without active sign of bleeding, counts noted reviewed, continues on heparin drip     03/10/2025: continues in MICU, no signs of bleeding, discussed with nursing staff at bedside, receiving 1u PRBC due to H/H downtrend, sputum culture with Pseudomonas, ICU and ID recs noted, continues to be intubated     03/11/2025: continues in MICU, intubated and sedated, noted counts, without signs of bleeding, appropriate response to transfusion    03/12/2025: continues in MICU, intubated and sedated, without signs of bleeding     03/13/2025: continues in MICU, s/p Tracheostomy 03/12/2025 with tracheal intubation, continues with sedation     03/14/2025:  awake and alert, mental status much improved, transferred from MICU to [address] [place] [address] 03/13/2025. Without significant events overnight, discussed with nursing staff at bedside, stated pt did well overnight, no signs of bleeding, no fever noted, medications provided via NGT and he is pending PEG-Tube placement today. His case is complex, noted with repeat need for intubations, discussed with pt, he is aware. Discussed with pts primary Oncologist as well.    03/15/2025: awake and alert, no overnight events noted, discussed with nursing staff, he did well overnight, no fevers, no signs of bleeding endorsed. He nods to information. Counts reviewed overall stable, discussed with him as well. Had PEG placed 03/14/2025 03/16/2025: no overnight events noted, comfortable overnight, nursing staff at bedside, stated pt did well, no signs of bleeding, had a BM. No fever reported. Counts reviewed, stable at this time     03/17/2025: no overnight events noted however continues with need for suctioning due to increase secretions, discussed with overnight staff, mild bleeding likely due to trauma from suctioning and recent tracheostomy placement without evidence of active bleeding otherwise. No fevers noted overnight. Had multiple smaller BMs, without evidence of bleeding, Nephrology recs and reviewed noted for Epo    03/18/2025: without significant overnight events, discussed with team at bedside, no evidence of bleeding, noted cardiology recs to start Eliquis 5mg BID for A.fib, currently on Lovenox, previously did require PRBC this admission but has since been stable, renal function seems to be improving, and Plt count improved, ok for AC per cardiology recs with Eliquis, monitor H/H closely     03/19/2025: comfortable, no overnight events noted, discussed with team, had 1 BM overnight without evidence of bleeding, now on Eliquis 5mg BID per cardiology recs for A.fib, renal function stable. Oxygen requirements stable overnight. No fevers reported. Pts primary Oncologist, Dr. Ovalles, aware, case discussed     03/20/2025: without significant events noted, continues on Eliquis, therapeutic, without signs of bleeding, continues on Epo, counts and imaging reviewed, no overnight events noted, continues with tracheostomy without increased oxygen requirements, increased secretions noted     03/21/2025: without significant overnight events noted. Discussed with overnight team, without evidence of bleeding, fever. Had a BM overnight, normal. Without increasing oxygen requirements, Without seizure like activity given hx of malignancy and cerebellar finding previously. Continues on Eliquis. Counts noted and reviewed, anemia, labile thrombocytopenia     03/22/2025:  no overnight events noted, discussed with overnight staff, pt did well, without increased oxygen requirements, without signs of bleeding. Had one BM overnight, without bleeding. Continues on Eliquis, Renal function is stable and appropriate. Counts and labs reviewed, overall stable however anemia persists.     03/23/2025: no significant overnight events noted, discussed with overnight team, without signs of bleeding. Oxygen requirements stable, pulmonary recs noted. Labs and course reviewed     03/24/2025:  comfortable, without significant overnight events noted, continues with Trach, without increased oxygen requirements, discussed with overnight team, had 1 BM overnight. No signs of bleeding noted, Counts and labs reviewed overall improving/stable. On exam without signs of bleeding, continues on Eliquis 5mg BID     03/25/2025: , comfortable this morning, without significant overnight events noted, without signs of bleeding, continues with tracheostomy, stable oxygen requirements. Counts, labs reviewed. Continues on Eliquis 5mg BID and TROY     03/26/2025: without signs of bleeding, counts and labs reviewed, case discussed, continues on Eliquis 5mg BID     03/27/2025: yesterday with coughing event with eating, overnight without changes in exam, no signs of bleeding, noted, counts and labs reviewed, stable/improving overall     03/28/2025: without significant overnight events, continued to be monitored closely, renal function stable. On Eliquis 5mg BID and Epo. No signs of bleeding. Continues with tracheostomy       #Acute hypoxic respiratory failure, Acute, Severe   # COVID  - CT noted with bilateral GGO  - ID following  - Pulmonary following  - Antibiotics per primary team/MICU and ID   - Intubated 02/23/2025 AM, MICU   - extubated 03/04/2025  - Pseudomonas from sputum culture   - Re-intubated 03/09/2025 due to increased work of breathing in setting of increased secretions, not protecting airway, tachypnea, hypoxia   - s/p Tracheostomy 03/12/2025    # Thrombocytopenia, Acute, Moderate   - Did occur during most recent admission and improved to normal prior to discharge   - Without signs of bleeding  - Could be multifactorial, possibly due to infection   - Continue to trend  - Transfuse to maintain Plt > 10k unless actively bleeding then maintain > 50k     # Brain lesion Hx of cancer, Acute, Severe   - pt with hx of seizures  - MRI brain now with 5.4 mm enhancing lesion in the LEFT middle cerebellar peduncle with associated edema suspicious for metastases  - MRI Lumbar negative for acute disease  - Small lesion without mass effect or edema, recommended to correlate per neurology if foci and locus are concordant with seizure activity  - Neurology recs noted, new lesion not likely to cause seizure  - It is rare, but nonetheless not impossible, for testicular cancer to be metastatic to the brain  - He will need another PET-CT, can be completed outpatient once stable if concern can proceed with biopsy at that time   - Previous endocrinology eval for thyroid nodule noted  - AFP/[facility] normal,  previously   - Repeat CTH without acute intracranial pathology     # 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  - Noted Anti E, Anti-K Anti-C antibodies previously  - direct josefina IgG positive, eluate negative, not likely to be clinically significant   - s/p 2u PRBC 03/03/2025 and 03/10/2025   - Monitor for bleeding  - Epo ordered  - Recommend to transfuse to maintain Hb > 7    # Klebsiella UTI, Acute, Severe > Moderate   # Pseudomonas UTI, Acute, Moderate   -Antibiotics per ID and primary team       Recommendations  - Trend CBC daily  - Transfuse to maintain Hb > 7   - Transfuse to maintain Plt >10k unless active bleeding then >50k   - Cardiology recs noted, now on Eliquis 5mg BID for A.fib, monitor H/H         Thank you for allowing me to participate in the care of Mr. Gr please do not hesitate to call or text me if you have further questions or concerns.     Brayan Mart MD  [facility]   Division of Hematology/Oncology  [address] [place] [address], [address]  [place] [address]  P: [phone]  F: [phone]    Attestation:    ----Pt evaluated, >50min spent including face-to-face interaction in addition to chart review, reviewing treatment plan, discussing with care staff in hospital, his outside physician, and managing, reviewing orders, labs and additional time spent documenting, in addition to the patient’s chronic diagnoses as listed in the assessment----

## 2025-03-28 NOTE — PROGRESS NOTE ADULT - SUBJECTIVE AND OBJECTIVE BOX
[facility]   [phone]  RENAL FOLLOW UP NOTE  --------------------------------------------------------------------------------  Chief Complaint:    24 hour events/subjective:    patient seen and examined.   communicative   eating when seen    PAST HISTORY  --------------------------------------------------------------------------------  No significant changes to PMH, PSH, FHx, SHx, unless otherwise noted    ALLERGIES & MEDICATIONS  --------------------------------------------------------------------------------  Allergies    seasonal allergies (Unknown)  No Known Drug Allergies    Intolerances    latex (Rash)    Standing Inpatient Medications  albuterol/ipratropium for Nebulization 3 milliLiter(s) Nebulizer every 6 hours  apixaban 5 milliGRAM(s) Enteral Tube every 12 hours  atorvastatin 20 milliGRAM(s) Oral at bedtime  chlorhexidine 4% Liquid 1 Application(s) Topical daily  cholecalciferol 1000 Unit(s) Oral daily  epoetin krystyna-epbx (RETACRIT) Injectable 23588 Unit(s) SubCutaneous <User Schedule>  escitalopram 15 milliGRAM(s) Oral with breakfast  gabapentin Solution 150 milliGRAM(s) Oral every 12 hours  influenza  Vaccine (HIGH DOSE) 0.5 milliLiter(s) IntraMuscular once  lacosamide Solution 200 milliGRAM(s) Oral two times a day  lamoTRIgine 50 milliGRAM(s) Oral two times a day  lanolin Ointment 1 Application(s) Topical daily  levETIRAcetam  Solution 750 milliGRAM(s) Oral two times a day  lurasidone 20 milliGRAM(s) Oral at bedtime  metoprolol tartrate 25 milliGRAM(s) Oral every 12 hours  nystatin Powder 1 Application(s) Topical every 8 hours  pantoprazole   Suspension 40 milliGRAM(s) Oral daily    PRN Inpatient Medications  acetaminophen     Tablet .. 650 milliGRAM(s) Oral every 6 hours PRN  aluminum hydroxide/magnesium hydroxide/simethicone Suspension 30 milliLiter(s) Oral every 4 hours PRN  artificial tears (preservative free) Ophthalmic Solution 1 Drop(s) Both EYES every 6 hours PRN  melatonin 3 milliGRAM(s) Oral at bedtime PRN  ondansetron Injectable 4 milliGRAM(s) IV Push every 8 hours PRN      REVIEW OF SYSTEMS  --------------------------------------------------------------------------------    Gen: denies fevers/chills,  CVS: denies chest pain  Resp: denies worsening SOB/Cough+  GI: Denies N/V/Abd pain  : Denies dysuria    VITALS/PHYSICAL EXAM  --------------------------------------------------------------------------------  T(C): 36.2 (03-28-25 @ 05:35), Max: 37.2 (03-27-25 @ 20:15)  HR: 70 (03-28-25 @ 09:43) (70 - 90)  BP: 129/60 (03-28-25 @ 05:35) (110/71 - 150/81)  RR: 18 (03-28-25 @ 09:43) (18 - 18)  SpO2: 99% (03-28-25 @ 09:43) (95% - 99%)  Wt(kg): --        03-27-25 @ 07:01  -  03-28-25 @ 07:00  --------------------------------------------------------  IN: 100 mL / OUT: 350 mL / NET: -250 mL      Physical Exam:  	              Gen trach collar communicative   	Pulm: decrease bs, +coarse bs    	CV: No JVD. RRR, S1S2; no rub  	Abd: +BS, soft, nondistended, Peg  	UE: Warm, no cyanosis  no clubbing,  no edema;  	LE: Warm, no cyanosis  no clubbing, no edema    LABS/STUDIES  --------------------------------------------------------------------------------    138  |  100  |  29  ----------------------------<  124      [03-27-25 @ 06:41]  4.9   |  25  |  1.64        Ca     9.1     [03-27-25 @ 06:41]            Creatinine Trend:  SCr 1.64 [03-27 @ 06:41]  SCr 1.59 [03-26 @ 07:11]  SCr 1.55 [03-25 @ 07:03]  SCr 1.44 [03-24 @ 06:08]  SCr 1.43 [03-23 @ 07:25]          Ferritin [id number]      [02-23-25 @ 06:15]  TSH 0.87      [01-25-25 @ 11:31]

## 2025-03-28 NOTE — PROGRESS NOTE ADULT - SUBJECTIVE AND OBJECTIVE BOX
Patient is a 69y old  Male who presents with a chief complaint of Acute on chronic hypoxemic respiratory failure (28 Mar 2025 04:39)      Interval Events:    REVIEW OF SYSTEMS:  [ ] Positive  [ ] All other systems negative  [ ] Unable to assess ROS because ________    Vital Signs Last 24 Hrs  T(C): 36.2 (03-28-25 @ 05:35), Max: 37.2 (03-27-25 @ 20:15)  T(F): 97.2 (03-28-25 @ 05:35), Max: 98.9 (03-27-25 @ 20:15)  HR: 76 (03-28-25 @ 06:18) (71 - 90)  BP: 129/60 (03-28-25 @ 05:35) (110/71 - 150/81)  RR: 18 (03-28-25 @ 05:35) (18 - 18)  SpO2: 99% (03-28-25 @ 06:18) (95% - 99%)PHYSICAL EXAM:  HEENT:   [ ]Tracheostomy:  [ ]Pupils equal  [ ]No oral lesions  [ ]Abnormal        SKIN  [ ]No Rash  [ ] Abnormal  [ ] pressure    CARDIAC  [ ]Regular  [ ]Abnormal    PULMONARY  [ ]Bilateral Clear Breath Sounds  [ ]Normal Excursion  [ ]Abnormal    GI  [ ]PEG      [ ] +BS		              [ ]Soft, nondistended, nontender	  [ ]Abnormal    MUSCULOSKELETAL                                   [ ]Bedbound                 [ ]Abnormal    [ ]Ambulatory/OOB to chair                           EXTREMITIES                                         [ ]Normal  [ ]Edema                           NEUROLOGIC  [ ] Normal, non focal  [ ] Focal findings:    PSYCHIATRIC  [ ]Alert and appropriate  [ ] Sedated	 [ ]Agitated    :  [place]: [ ] Yes, if yes: Date of Placement:                   [  ] No    LINES: Central Lines [ ] Yes, if yes: Date of Placement                                     [  ] No    HOSPITAL MEDICATIONS:  MEDICATIONS  (STANDING):  albuterol/ipratropium for Nebulization 3 milliLiter(s) Nebulizer every 6 hours  apixaban 5 milliGRAM(s) Enteral Tube every 12 hours  atorvastatin 20 milliGRAM(s) Oral at bedtime  chlorhexidine 4% Liquid 1 Application(s) Topical daily  cholecalciferol 1000 Unit(s) Oral daily  epoetin krystyna-epbx (RETACRIT) Injectable 77793 Unit(s) SubCutaneous <User Schedule>  escitalopram 15 milliGRAM(s) Oral with breakfast  gabapentin Solution 150 milliGRAM(s) Oral every 12 hours  influenza  Vaccine (HIGH DOSE) 0.5 milliLiter(s) IntraMuscular once  lacosamide Solution 200 milliGRAM(s) Oral two times a day  lamoTRIgine 50 milliGRAM(s) Oral two times a day  lanolin Ointment 1 Application(s) Topical daily  levETIRAcetam  Solution 750 milliGRAM(s) Oral two times a day  lurasidone 20 milliGRAM(s) Oral at bedtime  metoprolol tartrate 25 milliGRAM(s) Oral every 12 hours  nystatin Powder 1 Application(s) Topical every 8 hours  pantoprazole   Suspension 40 milliGRAM(s) Oral daily    MEDICATIONS  (PRN):  acetaminophen     Tablet .. 650 milliGRAM(s) Oral every 6 hours PRN Temp greater or equal to 38C (100.4F), Mild Pain (1 - 3)  aluminum hydroxide/magnesium hydroxide/simethicone Suspension 30 milliLiter(s) Oral every 4 hours PRN Dyspepsia  artificial tears (preservative free) Ophthalmic Solution 1 Drop(s) Both EYES every 6 hours PRN Dry Eyes  melatonin 3 milliGRAM(s) Oral at bedtime PRN Insomnia  ondansetron Injectable 4 milliGRAM(s) IV Push every 8 hours PRN Nausea and/or Vomiting      LABS:    03-27    138  |  100  |  29[H]  ----------------------------<  124[H]  4.9   |  25  |  1.64[H]    Ca    9.1      27 Mar 2025 06:41        Urinalysis Basic - ( 27 Mar 2025 06:41 )    Color: x / Appearance: x / SG: x / pH: x  Gluc: 124 mg/dL / Ketone: x  / Bili: x / Urobili: x   Blood: x / Protein: x / Nitrite: x   Leuk Esterase: x / RBC: x / WBC x   Sq Epi: x / Non Sq Epi: x / Bacteria: x          CAPILLARY BLOOD GLUCOSE    MICROBIOLOGY:     RADIOLOGY:  [ ] Reviewed and interpreted by me     Patient is a 69y old  Male who presents with a chief complaint of Acute on chronic hypoxemic respiratory failure (28 Mar 2025 04:39)      Interval Events: Stable overnight     REVIEW OF SYSTEMS:  [ ] Positive  [x ] All other systems negative- communicates w/ PMV   [ ] Unable to assess ROS because ________    Vital Signs Last 24 Hrs  T(C): 36.2 (03-28-25 @ 05:35), Max: 37.2 (03-27-25 @ 20:15)  T(F): 97.2 (03-28-25 @ 05:35), Max: 98.9 (03-27-25 @ 20:15)  HR: 76 (03-28-25 @ 06:18) (71 - 90)  BP: 129/60 (03-28-25 @ 05:35) (110/71 - 150/81)  RR: 18 (03-28-25 @ 05:35) (18 - 18)  SpO2: 99% (03-28-25 @ 06:18) (95% - 99%)    PHYSICAL EXAM:    HEENT:   [X]Tracheostomy: # 7 cuffless Portex, + PMV use   []Pupils equal  [ ]No oral lesions  [ ]Abnormal    SKIN  [ ]No Rash  [X] Abnormal: macerated skin noted on perineum, buttocks, and inner thighs; Lt occipital scalp injury.  [ ] pressure    CARDIAC  [X]Regular  [ ]Abnormal    PULMONARY  [X]Bilateral Clear Breath Sounds  [ ]Normal Excursion  [ ]Abnormal    GI  [X]PEG site c/d/i      [X] +BS		              [X]Soft, nondistended, nontender	  [ ]Abnormal    MUSCULOSKELETAL                                   [X]Bedbound                 [ ]Abnormal    [ ]Ambulatory/OOB to chair                           EXTREMITIES                                         [X]Normal  [ ]Edema                           NEUROLOGIC  [X] Normal, non focal  [ ] Focal findings:    PSYCHIATRIC  [X]Alert and appropriate Mood   [ ] Sedated	 [ ]Agitated    :  [place]: [ ] Yes, if yes: Date of Placement:                   [X] No    LINES: Central Lines [ ] Yes, if yes: Date of Placement                                     [X] No    HOSPITAL MEDICATIONS:  MEDICATIONS  (STANDING):  albuterol/ipratropium for Nebulization 3 milliLiter(s) Nebulizer every 6 hours  apixaban 5 milliGRAM(s) Enteral Tube every 12 hours  atorvastatin 20 milliGRAM(s) Oral at bedtime  chlorhexidine 4% Liquid 1 Application(s) Topical daily  cholecalciferol 1000 Unit(s) Oral daily  epoetin krystyna-epbx (RETACRIT) Injectable 46111 Unit(s) SubCutaneous <User Schedule>  escitalopram 15 milliGRAM(s) Oral with breakfast  gabapentin Solution 150 milliGRAM(s) Oral every 12 hours  influenza  Vaccine (HIGH DOSE) 0.5 milliLiter(s) IntraMuscular once  lacosamide Solution 200 milliGRAM(s) Oral two times a day  lamoTRIgine 50 milliGRAM(s) Oral two times a day  lanolin Ointment 1 Application(s) Topical daily  levETIRAcetam  Solution 750 milliGRAM(s) Oral two times a day  lurasidone 20 milliGRAM(s) Oral at bedtime  metoprolol tartrate 25 milliGRAM(s) Oral every 12 hours  nystatin Powder 1 Application(s) Topical every 8 hours  pantoprazole   Suspension 40 milliGRAM(s) Oral daily    MEDICATIONS  (PRN):  acetaminophen     Tablet .. 650 milliGRAM(s) Oral every 6 hours PRN Temp greater or equal to 38C (100.4F), Mild Pain (1 - 3)  aluminum hydroxide/magnesium hydroxide/simethicone Suspension 30 milliLiter(s) Oral every 4 hours PRN Dyspepsia  artificial tears (preservative free) Ophthalmic Solution 1 Drop(s) Both EYES every 6 hours PRN Dry Eyes  melatonin 3 milliGRAM(s) Oral at bedtime PRN Insomnia  ondansetron Injectable 4 milliGRAM(s) IV Push every 8 hours PRN Nausea and/or Vomiting      LABS:    03-27    138  |  100  |  29[H]  ----------------------------<  124[H]  4.9   |  25  |  1.64[H]    Ca    9.1      27 Mar 2025 06:41        Urinalysis Basic - ( 27 Mar 2025 06:41 )    Color: x / Appearance: x / SG: x / pH: x  Gluc: 124 mg/dL / Ketone: x  / Bili: x / Urobili: x   Blood: x / Protein: x / Nitrite: x   Leuk Esterase: x / RBC: x / WBC x   Sq Epi: x / Non Sq Epi: x / Bacteria: x          CAPILLARY BLOOD GLUCOSE    MICROBIOLOGY:     RADIOLOGY:  [ ] Reviewed and interpreted by me

## 2025-03-28 NOTE — PROGRESS NOTE ADULT - PROBLEM SELECTOR PROBLEM 4
Acute on chronic heart failure with preserved ejection fraction
Afib
Paroxysmal atrial fibrillation
Afib
Afib
Acute on chronic heart failure with preserved ejection fraction
Epilepsy
Acute on chronic heart failure with preserved ejection fraction
Paroxysmal atrial fibrillation
Acute on chronic heart failure with preserved ejection fraction
Acute on chronic heart failure with preserved ejection fraction
Afib
Acute on chronic heart failure with preserved ejection fraction
Afib
Epilepsy
Afib
Afib
Acute on chronic heart failure with preserved ejection fraction
Acute on chronic heart failure with preserved ejection fraction
Afib
Acute on chronic heart failure with preserved ejection fraction
Acute on chronic heart failure with preserved ejection fraction
Afib
Acute on chronic heart failure with preserved ejection fraction
Afib
Afib
Acute on chronic heart failure with preserved ejection fraction
Acute on chronic heart failure with preserved ejection fraction
Paroxysmal atrial fibrillation
Acute on chronic heart failure with preserved ejection fraction
Acute on chronic heart failure with preserved ejection fraction
Afib
Acute on chronic heart failure with preserved ejection fraction
Afib
Acute on chronic heart failure with preserved ejection fraction
Epilepsy
Paroxysmal atrial fibrillation
Afib
Acute on chronic heart failure with preserved ejection fraction
Afib
Afib
Paroxysmal atrial fibrillation
Acute on chronic heart failure with preserved ejection fraction
Afib
Epilepsy
Epilepsy
Paroxysmal atrial fibrillation
Acute on chronic heart failure with preserved ejection fraction
Epilepsy
Paroxysmal atrial fibrillation
Epilepsy
Paroxysmal atrial fibrillation
Paroxysmal atrial fibrillation

## 2025-03-28 NOTE — DISCHARGE NOTE NURSING/CASE MANAGEMENT/SOCIAL WORK - PATIENT PORTAL LINK FT
You can access the FollowMyHealth Patient Portal offered by [facility] by registering at the following website: [email or web address]. By joining [facility]’s FollowMyHealth portal, you will also be able to view your health information using other applications (apps) compatible with our system.

## 2025-03-28 NOTE — DISCHARGE NOTE NURSING/CASE MANAGEMENT/SOCIAL WORK - FINANCIAL ASSISTANCE
[facility] provides services at a reduced cost to those who are determined to be eligible through [facility]’s financial assistance program. Information regarding [facility]’s financial assistance program can be found by going to [email or web address] or by calling [phone].

## 2025-03-28 NOTE — PROGRESS NOTE ADULT - NS ATTEND BILL GEN_ALL_CORE
Attending to bill

## 2025-03-28 NOTE — PROGRESS NOTE ADULT - NS ATTEND OPT1 GEN_ALL_CORE
I independently performed the documented:
I attest my time as attending is greater than 50% of the total combined time spent on qualifying patient care activities by the PA/NP and attending.
I independently performed the documented:
I attest my time as attending is greater than 50% of the total combined time spent on qualifying patient care activities by the PA/NP and attending.
I independently performed the documented:
I attest my time as attending is greater than 50% of the total combined time spent on qualifying patient care activities by the PA/NP and attending.
I independently performed the documented:
I independently performed the documented:
I attest my time as attending is greater than 50% of the total combined time spent on qualifying patient care activities by the PA/NP and attending.
I attest my time as attending is greater than 50% of the total combined time spent on qualifying patient care activities by the PA/NP and attending.
I independently performed the documented:
I attest my time as attending is greater than 50% of the total combined time spent on qualifying patient care activities by the PA/NP and attending.
I independently performed the documented:

## 2025-03-28 NOTE — PROGRESS NOTE ADULT - PROBLEM SELECTOR PLAN 7
- S/p Peg placement 3/14. EGD with normal findings  - Feeds changed on 3/25 to Vital 1.5 in setting of loose stool   - Repeat FEES 3/18: Not cleared for enteral diet  - Passed MBS 3/27, diet advanced to regular w/ thickened liquids    - Miralax dcd in setting of loose stools

## 2025-03-28 NOTE — PROGRESS NOTE ADULT - PROBLEM SELECTOR PLAN 2
- See above  - Appreciate ID
- See above  - Appreciate ID
speech & swallow eval appreciated, s/p FEES: recommends thicken pureed diet.  events reviewed, RRT for hypoxia, intubated/sedated for increased work of breathing.   transferred to ICU. GOC discussed, full code per the sister.   Bumex gtt for overload, now off. weaned off pressors.   completed abx, completed antiviral.   ICU care appreciated.   vEEG: no seizures.  s/p 1 unit prbc, stable hgb, extubated 3/4/25. Frequent suctioning for secretions.   febrile 100.9F, IV Zosyn restarted. cultures sent. ID f/u.   re-intubated for increased work of breathing again.   plan for trach per ICU team given multiple failed extubations
-COVID +  -CT chest with b/l GGO, new since 1/30/25. Likely COVID PNA +/- superimposed bacterial PNA.   -S/p Remdesivir, S/p Decadron.  3/9: cont supportive care  3/10; s/p treatment:  remains intubated and sedated  3/12: s/p trach   conformable:  cont sedation
- See above  - Appreciate ID
-COVID +  -CT chest with b/l GGO, new since 1/30/25. Likely COVID PNA +/- superimposed bacterial PNA.   -Continue bronchodilators/mucolytics  -Decadron 6 mg qd x10 days   -Started on Remdesivir, monitor creatinine & LFTs  -Trend inflammatory markers  -DVT ppx.
-COVID +  -CT chest with b/l GGO, new since 1/30/25. Likely COVID PNA +/- superimposed bacterial PNA.   -S/p Remdesivir  -Continue Decadron 6 mg qd x10 days   -DVT ppx.    2/27: finished steroids:  on antibiotics and diuretics drip
- See above  - Appreciate ID
-COVID +  -CT chest with b/l GGO, new since 1/30/25. Likely COVID PNA +/- superimposed bacterial PNA.   -Continue bronchodilators/mucolytics  -Decadron 6 mg qd x10 days   -Started on Remdesivir, monitor creatinine & LFTs  -Trend inflammatory markers  -DVT ppx.
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 Ovalles 12/03/2024, markers continued to be negative  - MRI Neck showed 4.2 cm RIGHT upper lobe mass/infiltrate  - MRI brain now with 5.4 mm enhancing lesion in the LEFT middle cerebellar peduncle w/edema suspicious ? 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
- See above  - Appreciate ID
-COVID +  -CT chest with b/l GGO, new since 1/30/25. Likely COVID PNA +/- superimposed bacterial PNA.   -S/p Remdesivir  -Continue Decadron 6 mg qd x10 days   -DVT ppx.    2/27: finished steroids:  on antibiotics and diuretics drip  2/28: remains on diuretics drip as well as antibiotics:
-COVID +  -CT chest with b/l GGO, new since 1/30/25. Likely COVID PNA +/- superimposed bacterial PNA.   -S/p Remdesivir  -Continue Decadron 6 mg qd x10 days   -DVT ppx.  2/27: finished steroids:  on antibiotics and diuretics drip  2/28: remains on diuretics drip as well as antibiotics:  3/1: cont to be on same treatment: on bumex drip  3/2: off bumex drip today  3/3: finished rx for covid:  3/4: cont current supportive rx  3/5: resolved
-COVID +  -CT chest with b/l GGO, new since 1/30/25. Likely COVID PNA +/- superimposed bacterial PNA.   -Continue bronchodilators/mucolytics  -Continue Decadron 6 mg qd x10 days   -Continue Remdesivir, monitor creatinine & LFTs  -Trend inflammatory markers  -DVT ppx.    2/24: still on steroids:  hypoxic on ventilator:  defer to [facility] ICU
-COVID +  -CT chest with b/l GGO, new since 1/30/25. Likely COVID PNA +/- superimposed bacterial PNA.   -S/p Remdesivir, S/p Decadron.
- See above  - Appreciate ID
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 Ovalles 12/03/2024, markers continued to be negative  - MRI Neck showed 4.2 cm RIGHT upper lobe mass/infiltrate  - MRI brain now with 5.4 mm enhancing lesion in the LEFT middle cerebellar peduncle w/edema suspicious ? 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
- See above  - Appreciate ID
- See above  - Appreciate ID
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 Ovalles 12/03/2024, markers continued to be negative  - See above in regards to brain lesion  - MRI Neck showed 4.2 cm RIGHT upper lobe mass/infiltrate  - Recommended IR guided biopsy of RUL mass if PET-CT concordant/suggestive of malignancy, PET-CT as
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 Ovalles 12/03/2024, markers continued to be negative  - MRI Neck showed 4.2 cm RIGHT upper lobe mass/infiltrate  - MRI brain now with 5.4 mm enhancing lesion in the LEFT middle cerebellar peduncle w/edema suspicious ? 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
-COVID +  -CT chest with b/l GGO, new since 1/30/25. Likely COVID PNA +/- superimposed bacterial PNA.   -Continue bronchodilators/mucolytics  -Decadron 6 mg qd x10 days   -Started on Remdesivir, monitor creatinine & LFTs  -Trend inflammatory markers  -DVT ppx.
-COVID +  -CT chest with b/l GGO, new since 1/30/25. Likely COVID PNA +/- superimposed bacterial PNA.   -S/p Remdesivir  -Continue Decadron 6 mg qd x10 days   -DVT ppx.    2/27: finished steroids:  on antibiotics and diuretics drip  2/28: remains on diuretics drip as well as antibiotics:  3/1: cont to be on same treatment: on bumex drip
-COVID +  -CT chest with b/l GGO, new since 1/30/25. Likely COVID PNA +/- superimposed bacterial PNA.   -S/p Remdesivir  -Continue Decadron 6 mg qd x10 days   -DVT ppx.  2/27: finished steroids:  on antibiotics and diuretics drip  2/28: remains on diuretics drip as well as antibiotics:  3/1: cont to be on same treatment: on bumex drip  3/2: off bumex drip today  3/3: finished rx for covid:  3/4: cont current supportive rx
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 Ovalles 12/03/2024, markers continued to be negative  - MRI Neck showed 4.2 cm RIGHT upper lobe mass/infiltrate  - MRI brain now with 5.4 mm enhancing lesion in the LEFT middle cerebellar peduncle w/edema suspicious ? 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
- See above  - Appreciate ID
-COVID +  -CT chest with b/l GGO, new since 1/30/25. Likely COVID PNA +/- superimposed bacterial PNA.   -Continue bronchodilators/mucolytics  -Continue Decadron 6 mg qd x10 days   -Continue Remdesivir, monitor creatinine & LFTs  -Trend inflammatory markers  -DVT ppx.
-COVID +  -CT chest with b/l GGO, new since 1/30/25. Likely COVID PNA +/- superimposed bacterial PNA.   -S/p Remdesivir  -Continue Decadron 6 mg qd x10 days   -DVT ppx.
-COVID +  -CT chest with b/l GGO, new since 1/30/25. Likely COVID PNA +/- superimposed bacterial PNA.   -S/p Remdesivir, S/p Decadron.  3/9: cont supportive care
-COVID +  -CT chest with b/l GGO, new since 1/30/25. Likely COVID PNA +/- superimposed bacterial PNA.   -Continue bronchodilators/mucolytics  -Trend inflammatory markers  -DVT ppx  -Steroids as above  -ID f/u, monitoring off Remdesivir. ? benefit of starting given CT chest findings
-COVID +  -CT chest with b/l GGO, new since 1/30/25. Likely COVID PNA +/- superimposed bacterial PNA.   -S/p Remdesivir  -Continue Decadron 6 mg qd x10 days   -DVT ppx.  2/27: finished steroids:  on antibiotics and diuretics drip  2/28: remains on diuretics drip as well as antibiotics:  3/1: cont to be on same treatment: on bumex drip  3/2: off bumex drip today  3/3: finished rx for covid:
- See above  - Appreciate ID
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 Ovalles 12/03/2024, markers continued to be negative  - MRI Neck showed 4.2 cm RIGHT upper lobe mass/infiltrate  - MRI brain now with 5.4 mm enhancing lesion in the LEFT middle cerebellar peduncle w/edema suspicious ? 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
- See above  - Appreciate ID
-COVID +  -CT chest with b/l GGO, new since 1/30/25. Likely COVID PNA +/- superimposed bacterial PNA.   -S/p Remdesivir  -Continue Decadron 6 mg qd x10 days   -DVT ppx.
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 Ovalles 12/03/2024, markers continued to be negative  - MRI Neck showed 4.2 cm RIGHT upper lobe mass/infiltrate  - MRI brain now with 5.4 mm enhancing lesion in the LEFT middle cerebellar peduncle w/edema suspicious ? 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
- See above  - Appreciate ID
-COVID +  -CT chest with b/l GGO, new since 1/30/25. Likely COVID PNA +/- superimposed bacterial PNA.   -S/p Remdesivir, S/p Decadron.
-COVID +  -CT chest with b/l GGO, new since 1/30/25. Likely COVID PNA +/- superimposed bacterial PNA.   -S/p Remdesivir, S/p Decadron.  3/9: cont supportive care  3/10; s/p treatment:  remains intubated and sedated
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 Ovalles 12/03/2024, markers continued to be negative  - MRI Neck showed 4.2 cm RIGHT upper lobe mass/infiltrate  - MRI brain now with 5.4 mm enhancing lesion in the LEFT middle cerebellar peduncle w/edema suspicious ? 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 Ovalles 12/03/2024, markers continued to be negative  - MRI Neck showed 4.2 cm RIGHT upper lobe mass/infiltrate  - MRI brain now with 5.4 mm enhancing lesion in the LEFT middle cerebellar peduncle w/edema suspicious ? 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
-COVID +  -CT chest with b/l GGO, new since 1/30/25. Likely COVID PNA +/- superimposed bacterial PNA.   -S/p Remdesivir  -Continue Decadron 6 mg qd x10 days   -DVT ppx.    2/27: finished steroids:  on antibiotics and diuretics drip  2/28: remains on diuretics drip as well as antibiotics:  3/1: cont to be on same treatment: on bumex drip  3/2: off bumex drip today
- See above  - Appreciate ID
- See above  - Appreciate ID
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 Ovalles 12/03/2024, markers continued to be negative  - See above in regards to brain lesion  - MRI Neck showed 4.2 cm RIGHT upper lobe mass/infiltrate  - Recommended IR guided biopsy of RUL mass if PET-CT concordant/suggestive of malignancy, PET-CT as
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 Ovalles 12/03/2024, markers continued to be negative  - MRI Neck showed 4.2 cm RIGHT upper lobe mass/infiltrate  - MRI brain now with 5.4 mm enhancing lesion in the LEFT middle cerebellar peduncle w/edema suspicious ? 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 Ovalles 12/03/2024, markers continued to be negative  - See above in regards to brain lesion  - MRI Neck showed 4.2 cm RIGHT upper lobe mass/infiltrate  - Recommended IR guided biopsy of RUL mass if PET-CT concordant/suggestive of malignancy, PET-CT as

## 2025-03-28 NOTE — PROGRESS NOTE ADULT - ASSESSMENT
69 year old male with a past medical history of hypertension, hyperlipemia VAZQUEZ (on CPAP at bedtime, NC 2 liters during the day), CKD stage 3, epilepsy, schizophrenia, developmental delay, metastatic testicular cancer (s/p orchiectomy, actively on chemotherapy, last dose of etoposide/cisplatin on 6/2024) presenting with worsening shortness of breath. Patient was recently hospitalized at [facility] from January 27th 2025 to February 6th 2025 for seizure and influenza virus infection, which required intubation. He was sent to rehab (originally from home) from hospital. He returns due to sudden onset shortness of breath and worsening oxygenation. Of note, he tested positive for COVID-19       1-  CKDIII  2- covid-19  3- anemia  4- hypotension  5- respiratory failure         SHAGUFTA suspected in setting of new infection. covid 19   creatinine is steady in this range -> ckd III   bp is in range   anemia, trend hb   retacrit [id number] U tiw sq  diet: moderately thick liquids  strict I/O  trend creatinine and electroytes daily

## (undated) DEVICE — SOL INJ NS 0.9% 500ML 2 PORT

## (undated) DEVICE — CATH IV SAFE BC 20G X 1.16" (PINK)

## (undated) DEVICE — BALLOON US ENDO

## (undated) DEVICE — TUBING SUCTION 20FT

## (undated) DEVICE — TUBING IV SET GRAVITY 3Y 100" MACRO

## (undated) DEVICE — SUCTION YANKAUER NO CONTROL VENT

## (undated) DEVICE — SYR ALLIANCE II INFLATION 60ML

## (undated) DEVICE — SENSOR O2 FINGER ADULT

## (undated) DEVICE — BITE BLOCK ADULT 20 X 27MM (GREEN)

## (undated) DEVICE — TUBING SUCTION CONN 6FT STERILE

## (undated) DEVICE — FOLEY HOLDER STATLOCK 2 WAY ADULT

## (undated) DEVICE — ABDOMINAL BINDER MED/LG 9" X 36"-64"

## (undated) DEVICE — PACK IV START WITH CHG

## (undated) DEVICE — CATH IV SAFE BC 22G X 1" (BLUE)